# Patient Record
Sex: MALE | Race: WHITE | HISPANIC OR LATINO | Employment: OTHER | URBAN - METROPOLITAN AREA
[De-identification: names, ages, dates, MRNs, and addresses within clinical notes are randomized per-mention and may not be internally consistent; named-entity substitution may affect disease eponyms.]

---

## 2017-01-03 ENCOUNTER — ALLSCRIPTS OFFICE VISIT (OUTPATIENT)
Dept: OTHER | Facility: OTHER | Age: 67
End: 2017-01-03

## 2017-03-01 DIAGNOSIS — N28.9 DISORDER OF KIDNEY AND URETER: ICD-10-CM

## 2017-04-17 ENCOUNTER — HOSPITAL ENCOUNTER (EMERGENCY)
Facility: HOSPITAL | Age: 67
Discharge: HOME | End: 2017-04-17
Attending: EMERGENCY MEDICINE | Admitting: EMERGENCY MEDICINE
Payer: MEDICARE

## 2017-04-17 ENCOUNTER — APPOINTMENT (EMERGENCY)
Dept: RADIOLOGY | Facility: HOSPITAL | Age: 67
End: 2017-04-17
Payer: MEDICARE

## 2017-04-17 VITALS
WEIGHT: 130 LBS | TEMPERATURE: 98.8 F | OXYGEN SATURATION: 97 % | DIASTOLIC BLOOD PRESSURE: 82 MMHG | RESPIRATION RATE: 18 BRPM | HEART RATE: 90 BPM | SYSTOLIC BLOOD PRESSURE: 118 MMHG

## 2017-04-17 DIAGNOSIS — G44.209 TENSION HEADACHE: Primary | ICD-10-CM

## 2017-04-17 LAB
ANION GAP SERPL CALCULATED.3IONS-SCNC: 6 MMOL/L (ref 4–13)
APTT PPP: 28 SECONDS (ref 24–36)
BASOPHILS # BLD AUTO: 0.03 THOUSANDS/ΜL (ref 0–0.1)
BASOPHILS NFR BLD AUTO: 0 % (ref 0–1)
BUN SERPL-MCNC: 21 MG/DL (ref 5–25)
CALCIUM SERPL-MCNC: 8.1 MG/DL (ref 8.3–10.1)
CHLORIDE SERPL-SCNC: 105 MMOL/L (ref 100–108)
CO2 SERPL-SCNC: 25 MMOL/L (ref 21–32)
CREAT SERPL-MCNC: 1.9 MG/DL (ref 0.6–1.3)
EOSINOPHIL # BLD AUTO: 0.48 THOUSAND/ΜL (ref 0–0.61)
EOSINOPHIL NFR BLD AUTO: 6 % (ref 0–6)
ERYTHROCYTE [DISTWIDTH] IN BLOOD BY AUTOMATED COUNT: 14.2 % (ref 11.6–15.1)
GFR SERPL CREATININE-BSD FRML MDRD: 35.3 ML/MIN/1.73SQ M
GLUCOSE SERPL-MCNC: 236 MG/DL (ref 65–140)
HCT VFR BLD AUTO: 36.5 % (ref 36.5–49.3)
HGB BLD-MCNC: 11.7 G/DL (ref 12–17)
INR PPP: 1.1 (ref 0.86–1.16)
LYMPHOCYTES # BLD AUTO: 1.72 THOUSANDS/ΜL (ref 0.6–4.47)
LYMPHOCYTES NFR BLD AUTO: 23 % (ref 14–44)
MCH RBC QN AUTO: 24.7 PG (ref 26.8–34.3)
MCHC RBC AUTO-ENTMCNC: 32.1 G/DL (ref 31.4–37.4)
MCV RBC AUTO: 77 FL (ref 82–98)
MONOCYTES # BLD AUTO: 0.51 THOUSAND/ΜL (ref 0.17–1.22)
MONOCYTES NFR BLD AUTO: 7 % (ref 4–12)
NEUTROPHILS # BLD AUTO: 4.85 THOUSANDS/ΜL (ref 1.85–7.62)
NEUTS SEG NFR BLD AUTO: 64 % (ref 43–75)
NRBC BLD AUTO-RTO: 0 /100 WBCS
PLATELET # BLD AUTO: 114 THOUSANDS/UL (ref 149–390)
PMV BLD AUTO: 12.2 FL (ref 8.9–12.7)
POTASSIUM SERPL-SCNC: 4.4 MMOL/L (ref 3.5–5.3)
PROTHROMBIN TIME: 14.3 SECONDS (ref 12–14.3)
RBC # BLD AUTO: 4.74 MILLION/UL (ref 3.88–5.62)
SODIUM SERPL-SCNC: 136 MMOL/L (ref 136–145)
WBC # BLD AUTO: 7.62 THOUSAND/UL (ref 4.31–10.16)

## 2017-04-17 PROCEDURE — 99284 EMERGENCY DEPT VISIT MOD MDM: CPT

## 2017-04-17 PROCEDURE — 85025 COMPLETE CBC W/AUTO DIFF WBC: CPT | Performed by: EMERGENCY MEDICINE

## 2017-04-17 PROCEDURE — 80048 BASIC METABOLIC PNL TOTAL CA: CPT | Performed by: EMERGENCY MEDICINE

## 2017-04-17 PROCEDURE — 96372 THER/PROPH/DIAG INJ SC/IM: CPT

## 2017-04-17 PROCEDURE — 85610 PROTHROMBIN TIME: CPT | Performed by: EMERGENCY MEDICINE

## 2017-04-17 PROCEDURE — 85730 THROMBOPLASTIN TIME PARTIAL: CPT | Performed by: EMERGENCY MEDICINE

## 2017-04-17 PROCEDURE — 70450 CT HEAD/BRAIN W/O DYE: CPT

## 2017-04-17 PROCEDURE — 36415 COLL VENOUS BLD VENIPUNCTURE: CPT | Performed by: EMERGENCY MEDICINE

## 2017-04-17 RX ORDER — TRAMADOL HYDROCHLORIDE 50 MG/1
50 TABLET ORAL
COMMUNITY
End: 2018-01-29 | Stop reason: SDUPTHER

## 2017-04-17 RX ORDER — DIAZEPAM 5 MG/1
5 TABLET ORAL 2 TIMES DAILY
Qty: 20 TABLET | Refills: 0 | Status: ON HOLD | OUTPATIENT
Start: 2017-04-17 | End: 2017-09-18

## 2017-04-17 RX ORDER — DIPHENHYDRAMINE HYDROCHLORIDE 50 MG/ML
50 INJECTION INTRAMUSCULAR; INTRAVENOUS ONCE
Status: DISCONTINUED | OUTPATIENT
Start: 2017-04-17 | End: 2017-04-17

## 2017-04-17 RX ORDER — METOCLOPRAMIDE HYDROCHLORIDE 5 MG/ML
10 INJECTION INTRAMUSCULAR; INTRAVENOUS ONCE
Status: DISCONTINUED | OUTPATIENT
Start: 2017-04-17 | End: 2017-04-17

## 2017-04-17 RX ORDER — METOCLOPRAMIDE 10 MG/1
10 TABLET ORAL ONCE
Status: COMPLETED | OUTPATIENT
Start: 2017-04-17 | End: 2017-04-17

## 2017-04-17 RX ORDER — ACETAMINOPHEN 325 MG/1
975 TABLET ORAL ONCE
Status: COMPLETED | OUTPATIENT
Start: 2017-04-17 | End: 2017-04-17

## 2017-04-17 RX ORDER — DIAZEPAM 5 MG/1
5 TABLET ORAL ONCE
Status: COMPLETED | OUTPATIENT
Start: 2017-04-17 | End: 2017-04-17

## 2017-04-17 RX ORDER — DIPHENHYDRAMINE HYDROCHLORIDE 50 MG/ML
25 INJECTION INTRAMUSCULAR; INTRAVENOUS ONCE
Status: DISCONTINUED | OUTPATIENT
Start: 2017-04-17 | End: 2017-04-17

## 2017-04-17 RX ORDER — OXYCODONE HYDROCHLORIDE AND ACETAMINOPHEN 5; 325 MG/1; MG/1
1 TABLET ORAL EVERY 4 HOURS PRN
Qty: 8 TABLET | Refills: 0 | Status: SHIPPED | OUTPATIENT
Start: 2017-04-17 | End: 2017-04-27

## 2017-04-17 RX ORDER — DIAZEPAM 5 MG/ML
5 INJECTION, SOLUTION INTRAMUSCULAR; INTRAVENOUS ONCE
Status: DISCONTINUED | OUTPATIENT
Start: 2017-04-17 | End: 2017-04-17

## 2017-04-17 RX ORDER — OXYCODONE HYDROCHLORIDE AND ACETAMINOPHEN 5; 325 MG/1; MG/1
1 TABLET ORAL ONCE
Status: DISCONTINUED | OUTPATIENT
Start: 2017-04-17 | End: 2017-04-17

## 2017-04-17 RX ADMIN — METOCLOPRAMIDE HYDROCHLORIDE 10 MG: 10 TABLET ORAL at 12:11

## 2017-04-17 RX ADMIN — DIAZEPAM 5 MG: 5 TABLET ORAL at 11:47

## 2017-04-17 RX ADMIN — ACETAMINOPHEN 975 MG: 325 TABLET, FILM COATED ORAL at 12:12

## 2017-04-17 RX ADMIN — HYDROMORPHONE HYDROCHLORIDE 1 MG: 1 INJECTION, SOLUTION INTRAMUSCULAR; INTRAVENOUS; SUBCUTANEOUS at 13:22

## 2017-04-21 ENCOUNTER — HOSPITAL ENCOUNTER (OUTPATIENT)
Dept: RADIOLOGY | Facility: HOSPITAL | Age: 67
Discharge: HOME | End: 2017-04-21
Payer: MEDICARE

## 2017-04-21 ENCOUNTER — ALLSCRIPTS OFFICE VISIT (OUTPATIENT)
Dept: OTHER | Facility: OTHER | Age: 67
End: 2017-04-21

## 2017-04-21 ENCOUNTER — TRANSCRIBE ORDERS (OUTPATIENT)
Dept: RADIOLOGY | Facility: HOSPITAL | Age: 67
End: 2017-04-21

## 2017-04-21 DIAGNOSIS — R07.89 OTHER CHEST PAIN: ICD-10-CM

## 2017-04-21 DIAGNOSIS — R05.9 COUGH: ICD-10-CM

## 2017-04-21 PROCEDURE — 71101 X-RAY EXAM UNILAT RIBS/CHEST: CPT

## 2017-04-24 ENCOUNTER — GENERIC CONVERSION - ENCOUNTER (OUTPATIENT)
Dept: OTHER | Facility: OTHER | Age: 67
End: 2017-04-24

## 2017-05-01 DIAGNOSIS — E55.9 VITAMIN D DEFICIENCY: ICD-10-CM

## 2017-05-01 DIAGNOSIS — N28.9 DISORDER OF KIDNEY AND URETER: ICD-10-CM

## 2017-05-01 DIAGNOSIS — D69.6 THROMBOCYTOPENIA (HCC): ICD-10-CM

## 2017-05-01 DIAGNOSIS — E11.40 TYPE 2 DIABETES MELLITUS WITH DIABETIC NEUROPATHY (HCC): ICD-10-CM

## 2017-05-09 ENCOUNTER — ALLSCRIPTS OFFICE VISIT (OUTPATIENT)
Dept: OTHER | Facility: OTHER | Age: 67
End: 2017-05-09

## 2017-05-23 ENCOUNTER — TRANSCRIBE ORDERS (OUTPATIENT)
Dept: LAB | Facility: HOSPITAL | Age: 67
End: 2017-05-23

## 2017-05-23 ENCOUNTER — APPOINTMENT (OUTPATIENT)
Dept: LAB | Facility: HOSPITAL | Age: 67
End: 2017-05-23
Payer: MEDICARE

## 2017-05-23 DIAGNOSIS — C22.1 MALIGNANT NEOPLASM OF INTRAHEPATIC BILE DUCTS (HCC): ICD-10-CM

## 2017-05-23 DIAGNOSIS — D68.9 BLOOD CLOTTING DISORDER (HCC): ICD-10-CM

## 2017-05-23 DIAGNOSIS — Z94.4 LIVER REPLACED BY TRANSPLANT (HCC): ICD-10-CM

## 2017-05-23 DIAGNOSIS — R79.1 ABNORMAL COAGULATION PROFILE: ICD-10-CM

## 2017-05-23 DIAGNOSIS — Z94.4 LIVER REPLACED BY TRANSPLANT (HCC): Primary | ICD-10-CM

## 2017-05-23 LAB
ALBUMIN SERPL BCP-MCNC: 3.6 G/DL (ref 3.5–5)
ALP SERPL-CCNC: 89 U/L (ref 46–116)
ALT SERPL W P-5'-P-CCNC: 15 U/L (ref 12–78)
ANION GAP SERPL CALCULATED.3IONS-SCNC: 6 MMOL/L (ref 4–13)
AST SERPL W P-5'-P-CCNC: 17 U/L (ref 5–45)
BASOPHILS # BLD AUTO: 0.03 THOUSANDS/ΜL (ref 0–0.1)
BASOPHILS NFR BLD AUTO: 1 % (ref 0–1)
BILIRUB SERPL-MCNC: 0.28 MG/DL (ref 0.2–1)
BUN SERPL-MCNC: 26 MG/DL (ref 5–25)
CALCIUM SERPL-MCNC: 9.5 MG/DL (ref 8.3–10.1)
CHLORIDE SERPL-SCNC: 106 MMOL/L (ref 100–108)
CO2 SERPL-SCNC: 29 MMOL/L (ref 21–32)
CREAT SERPL-MCNC: 1.72 MG/DL (ref 0.6–1.3)
EOSINOPHIL # BLD AUTO: 0.51 THOUSAND/ΜL (ref 0–0.61)
EOSINOPHIL NFR BLD AUTO: 8 % (ref 0–6)
ERYTHROCYTE [DISTWIDTH] IN BLOOD BY AUTOMATED COUNT: 14.7 % (ref 11.6–15.1)
GFR SERPL CREATININE-BSD FRML MDRD: 39.6 ML/MIN/1.73SQ M
GGT SERPL-CCNC: 29 U/L (ref 5–85)
GLUCOSE P FAST SERPL-MCNC: 112 MG/DL (ref 65–99)
HCT VFR BLD AUTO: 40.9 % (ref 36.5–49.3)
HGB BLD-MCNC: 13 G/DL (ref 12–17)
INR PPP: 1.05 (ref 0.86–1.16)
LYMPHOCYTES # BLD AUTO: 2.33 THOUSANDS/ΜL (ref 0.6–4.47)
LYMPHOCYTES NFR BLD AUTO: 35 % (ref 14–44)
MAGNESIUM SERPL-MCNC: 1.9 MG/DL (ref 1.6–2.6)
MCH RBC QN AUTO: 24.7 PG (ref 26.8–34.3)
MCHC RBC AUTO-ENTMCNC: 31.8 G/DL (ref 31.4–37.4)
MCV RBC AUTO: 78 FL (ref 82–98)
MONOCYTES # BLD AUTO: 0.42 THOUSAND/ΜL (ref 0.17–1.22)
MONOCYTES NFR BLD AUTO: 6 % (ref 4–12)
NEUTROPHILS # BLD AUTO: 3.32 THOUSANDS/ΜL (ref 1.85–7.62)
NEUTS SEG NFR BLD AUTO: 50 % (ref 43–75)
NRBC BLD AUTO-RTO: 0 /100 WBCS
PLATELET # BLD AUTO: 133 THOUSANDS/UL (ref 149–390)
PMV BLD AUTO: 12.4 FL (ref 8.9–12.7)
POTASSIUM SERPL-SCNC: 4.8 MMOL/L (ref 3.5–5.3)
PROT SERPL-MCNC: 8.5 G/DL (ref 6.4–8.2)
PROTHROMBIN TIME: 13.7 SECONDS (ref 12.1–14.4)
RBC # BLD AUTO: 5.26 MILLION/UL (ref 3.88–5.62)
SODIUM SERPL-SCNC: 141 MMOL/L (ref 136–145)
WBC # BLD AUTO: 6.62 THOUSAND/UL (ref 4.31–10.16)

## 2017-05-23 PROCEDURE — 80053 COMPREHEN METABOLIC PANEL: CPT

## 2017-05-23 PROCEDURE — 80197 ASSAY OF TACROLIMUS: CPT

## 2017-05-23 PROCEDURE — 85025 COMPLETE CBC W/AUTO DIFF WBC: CPT

## 2017-05-23 PROCEDURE — 85610 PROTHROMBIN TIME: CPT

## 2017-05-23 PROCEDURE — 83735 ASSAY OF MAGNESIUM: CPT

## 2017-05-23 PROCEDURE — 36415 COLL VENOUS BLD VENIPUNCTURE: CPT

## 2017-05-23 PROCEDURE — 82977 ASSAY OF GGT: CPT

## 2017-05-30 ENCOUNTER — ALLSCRIPTS OFFICE VISIT (OUTPATIENT)
Dept: OTHER | Facility: OTHER | Age: 67
End: 2017-05-30

## 2017-06-01 LAB — TACROLIMUS BLD LC/MS/MS-MCNC: 6.7 NG/ML (ref 2–20)

## 2017-08-29 ENCOUNTER — APPOINTMENT (OUTPATIENT)
Dept: LAB | Facility: HOSPITAL | Age: 67
End: 2017-08-29
Payer: MEDICARE

## 2017-08-29 ENCOUNTER — TRANSCRIBE ORDERS (OUTPATIENT)
Dept: LAB | Facility: HOSPITAL | Age: 67
End: 2017-08-29

## 2017-08-29 DIAGNOSIS — C22.1 MALIGNANT NEOPLASM OF INTRAHEPATIC BILE DUCTS (HCC): ICD-10-CM

## 2017-08-29 DIAGNOSIS — Z94.4 LIVER REPLACED BY TRANSPLANT (HCC): Primary | ICD-10-CM

## 2017-08-29 DIAGNOSIS — R79.1 ABNORMAL COAGULATION PROFILE: ICD-10-CM

## 2017-08-29 DIAGNOSIS — Z94.4 LIVER REPLACED BY TRANSPLANT (HCC): ICD-10-CM

## 2017-08-29 DIAGNOSIS — D68.9 BLOOD CLOTTING DISORDER (HCC): ICD-10-CM

## 2017-08-29 LAB
ALBUMIN SERPL BCP-MCNC: 3.6 G/DL (ref 3.5–5)
ALP SERPL-CCNC: 99 U/L (ref 46–116)
ALT SERPL W P-5'-P-CCNC: 12 U/L (ref 12–78)
ANION GAP SERPL CALCULATED.3IONS-SCNC: 5 MMOL/L (ref 4–13)
AST SERPL W P-5'-P-CCNC: 16 U/L (ref 5–45)
BASOPHILS # BLD AUTO: 0.05 THOUSANDS/ΜL (ref 0–0.1)
BASOPHILS NFR BLD AUTO: 1 % (ref 0–1)
BILIRUB SERPL-MCNC: 0.3 MG/DL (ref 0.2–1)
BUN SERPL-MCNC: 20 MG/DL (ref 5–25)
CALCIUM SERPL-MCNC: 9.2 MG/DL (ref 8.3–10.1)
CHLORIDE SERPL-SCNC: 103 MMOL/L (ref 100–108)
CO2 SERPL-SCNC: 29 MMOL/L (ref 21–32)
CREAT SERPL-MCNC: 1.73 MG/DL (ref 0.6–1.3)
EOSINOPHIL # BLD AUTO: 0.67 THOUSAND/ΜL (ref 0–0.61)
EOSINOPHIL NFR BLD AUTO: 10 % (ref 0–6)
ERYTHROCYTE [DISTWIDTH] IN BLOOD BY AUTOMATED COUNT: 14.4 % (ref 11.6–15.1)
GFR SERPL CREATININE-BSD FRML MDRD: 39 ML/MIN/1.73SQ M
GGT SERPL-CCNC: 23 U/L (ref 5–85)
GLUCOSE P FAST SERPL-MCNC: 107 MG/DL (ref 65–99)
HCT VFR BLD AUTO: 40 % (ref 36.5–49.3)
HGB BLD-MCNC: 12.6 G/DL (ref 12–17)
INR PPP: 1.06 (ref 0.86–1.16)
LYMPHOCYTES # BLD AUTO: 2.69 THOUSANDS/ΜL (ref 0.6–4.47)
LYMPHOCYTES NFR BLD AUTO: 40 % (ref 14–44)
MAGNESIUM SERPL-MCNC: 1.8 MG/DL (ref 1.6–2.6)
MCH RBC QN AUTO: 24.4 PG (ref 26.8–34.3)
MCHC RBC AUTO-ENTMCNC: 31.5 G/DL (ref 31.4–37.4)
MCV RBC AUTO: 78 FL (ref 82–98)
MONOCYTES # BLD AUTO: 0.53 THOUSAND/ΜL (ref 0.17–1.22)
MONOCYTES NFR BLD AUTO: 8 % (ref 4–12)
NEUTROPHILS # BLD AUTO: 2.79 THOUSANDS/ΜL (ref 1.85–7.62)
NEUTS SEG NFR BLD AUTO: 41 % (ref 43–75)
NRBC BLD AUTO-RTO: 0 /100 WBCS
PLATELET # BLD AUTO: 119 THOUSANDS/UL (ref 149–390)
PMV BLD AUTO: 12.1 FL (ref 8.9–12.7)
POTASSIUM SERPL-SCNC: 4.4 MMOL/L (ref 3.5–5.3)
PROT SERPL-MCNC: 8.3 G/DL (ref 6.4–8.2)
PROTHROMBIN TIME: 13.8 SECONDS (ref 12.1–14.4)
RBC # BLD AUTO: 5.16 MILLION/UL (ref 3.88–5.62)
SODIUM SERPL-SCNC: 137 MMOL/L (ref 136–145)
WBC # BLD AUTO: 6.75 THOUSAND/UL (ref 4.31–10.16)

## 2017-08-29 PROCEDURE — 80197 ASSAY OF TACROLIMUS: CPT

## 2017-08-29 PROCEDURE — 85610 PROTHROMBIN TIME: CPT

## 2017-08-29 PROCEDURE — 83735 ASSAY OF MAGNESIUM: CPT

## 2017-08-29 PROCEDURE — 82977 ASSAY OF GGT: CPT

## 2017-08-29 PROCEDURE — 36415 COLL VENOUS BLD VENIPUNCTURE: CPT

## 2017-08-29 PROCEDURE — 80053 COMPREHEN METABOLIC PANEL: CPT

## 2017-08-29 PROCEDURE — 85025 COMPLETE CBC W/AUTO DIFF WBC: CPT

## 2017-08-31 LAB — TACROLIMUS BLD LC/MS/MS-MCNC: 3.2 NG/ML (ref 2–20)

## 2017-09-18 ENCOUNTER — APPOINTMENT (EMERGENCY)
Dept: RADIOLOGY | Facility: HOSPITAL | Age: 67
DRG: 062 | End: 2017-09-18
Payer: MEDICARE

## 2017-09-18 ENCOUNTER — HOSPITAL ENCOUNTER (INPATIENT)
Facility: HOSPITAL | Age: 67
LOS: 2 days | Discharge: HOME | DRG: 062 | End: 2017-09-20
Attending: EMERGENCY MEDICINE | Admitting: EMERGENCY MEDICINE
Payer: MEDICARE

## 2017-09-18 ENCOUNTER — APPOINTMENT (INPATIENT)
Dept: RADIOLOGY | Facility: HOSPITAL | Age: 67
DRG: 062 | End: 2017-09-18
Payer: MEDICARE

## 2017-09-18 ENCOUNTER — APPOINTMENT (INPATIENT)
Dept: NON INVASIVE DIAGNOSTICS | Facility: HOSPITAL | Age: 67
DRG: 062 | End: 2017-09-18
Payer: MEDICARE

## 2017-09-18 DIAGNOSIS — I63.9 ACUTE ISCHEMIC STROKE (HCC): Primary | ICD-10-CM

## 2017-09-18 PROBLEM — Z94.4 LIVER TRANSPLANTED (HCC): Status: ACTIVE | Noted: 2017-09-18

## 2017-09-18 PROBLEM — Z86.19 HISTORY OF HEPATITIS C: Status: ACTIVE | Noted: 2017-09-18

## 2017-09-18 PROBLEM — E11.9 DIABETES MELLITUS (HCC): Status: ACTIVE | Noted: 2017-09-18

## 2017-09-18 PROBLEM — Z92.25 HISTORY OF IMMUNOSUPPRESSION THERAPY: Status: ACTIVE | Noted: 2017-09-18

## 2017-09-18 LAB
ANION GAP SERPL CALCULATED.3IONS-SCNC: 7 MMOL/L (ref 4–13)
APTT PPP: 38 SECONDS (ref 23–35)
ATRIAL RATE: 68 BPM
BUN SERPL-MCNC: 25 MG/DL (ref 5–25)
CALCIUM SERPL-MCNC: 9 MG/DL (ref 8.3–10.1)
CHLORIDE SERPL-SCNC: 103 MMOL/L (ref 100–108)
CO2 SERPL-SCNC: 25 MMOL/L (ref 21–32)
CREAT SERPL-MCNC: 1.66 MG/DL (ref 0.6–1.3)
ERYTHROCYTE [DISTWIDTH] IN BLOOD BY AUTOMATED COUNT: 14.4 % (ref 11.6–15.1)
GFR SERPL CREATININE-BSD FRML MDRD: 41 ML/MIN/1.73SQ M
GLUCOSE SERPL-MCNC: 136 MG/DL (ref 65–140)
GLUCOSE SERPL-MCNC: 154 MG/DL (ref 65–140)
GLUCOSE SERPL-MCNC: 186 MG/DL (ref 65–140)
HCT VFR BLD AUTO: 34.7 % (ref 36.5–49.3)
HGB BLD-MCNC: 11.3 G/DL (ref 12–17)
INR PPP: 1.03 (ref 0.86–1.16)
MCH RBC QN AUTO: 24.7 PG (ref 26.8–34.3)
MCHC RBC AUTO-ENTMCNC: 32.6 G/DL (ref 31.4–37.4)
MCV RBC AUTO: 76 FL (ref 82–98)
P AXIS: 0 DEGREES
PLATELET # BLD AUTO: 121 THOUSANDS/UL (ref 149–390)
PMV BLD AUTO: 12 FL (ref 8.9–12.7)
POTASSIUM SERPL-SCNC: 5.2 MMOL/L (ref 3.5–5.3)
PR INTERVAL: 164 MS
PROTHROMBIN TIME: 13.5 SECONDS (ref 12.1–14.4)
QRS AXIS: -12 DEGREES
QRSD INTERVAL: 82 MS
QT INTERVAL: 376 MS
QTC INTERVAL: 399 MS
RBC # BLD AUTO: 4.57 MILLION/UL (ref 3.88–5.62)
SODIUM SERPL-SCNC: 135 MMOL/L (ref 136–145)
SPECIMEN SOURCE: NORMAL
T WAVE AXIS: 26 DEGREES
TROPONIN I BLD-MCNC: 0.02 NG/ML (ref 0–0.08)
VENTRICULAR RATE: 68 BPM
WBC # BLD AUTO: 6.19 THOUSAND/UL (ref 4.31–10.16)

## 2017-09-18 PROCEDURE — 85610 PROTHROMBIN TIME: CPT | Performed by: EMERGENCY MEDICINE

## 2017-09-18 PROCEDURE — 70498 CT ANGIOGRAPHY NECK: CPT

## 2017-09-18 PROCEDURE — 70551 MRI BRAIN STEM W/O DYE: CPT

## 2017-09-18 PROCEDURE — 96374 THER/PROPH/DIAG INJ IV PUSH: CPT

## 2017-09-18 PROCEDURE — 3E03317 INTRODUCTION OF OTHER THROMBOLYTIC INTO PERIPHERAL VEIN, PERCUTANEOUS APPROACH: ICD-10-PCS | Performed by: INTERNAL MEDICINE

## 2017-09-18 PROCEDURE — 70496 CT ANGIOGRAPHY HEAD: CPT

## 2017-09-18 PROCEDURE — 99285 EMERGENCY DEPT VISIT HI MDM: CPT

## 2017-09-18 PROCEDURE — 70450 CT HEAD/BRAIN W/O DYE: CPT

## 2017-09-18 PROCEDURE — 85730 THROMBOPLASTIN TIME PARTIAL: CPT | Performed by: EMERGENCY MEDICINE

## 2017-09-18 PROCEDURE — 85027 COMPLETE CBC AUTOMATED: CPT | Performed by: EMERGENCY MEDICINE

## 2017-09-18 PROCEDURE — 93306 TTE W/DOPPLER COMPLETE: CPT

## 2017-09-18 PROCEDURE — 82948 REAGENT STRIP/BLOOD GLUCOSE: CPT

## 2017-09-18 PROCEDURE — 71020 HB CHEST X-RAY 2VW FRONTAL&LATL: CPT

## 2017-09-18 PROCEDURE — 36415 COLL VENOUS BLD VENIPUNCTURE: CPT | Performed by: EMERGENCY MEDICINE

## 2017-09-18 PROCEDURE — 80048 BASIC METABOLIC PNL TOTAL CA: CPT | Performed by: EMERGENCY MEDICINE

## 2017-09-18 PROCEDURE — 93005 ELECTROCARDIOGRAM TRACING: CPT | Performed by: EMERGENCY MEDICINE

## 2017-09-18 PROCEDURE — 84484 ASSAY OF TROPONIN QUANT: CPT

## 2017-09-18 RX ORDER — TACROLIMUS 1 MG/1
1 CAPSULE ORAL DAILY
Status: DISCONTINUED | OUTPATIENT
Start: 2017-09-18 | End: 2017-09-20 | Stop reason: HOSPADM

## 2017-09-18 RX ORDER — LORAZEPAM 2 MG/ML
INJECTION INTRAMUSCULAR
Status: DISCONTINUED
Start: 2017-09-18 | End: 2017-09-18 | Stop reason: WASHOUT

## 2017-09-18 RX ORDER — ZOLPIDEM TARTRATE 5 MG/1
10 TABLET ORAL ONCE
Status: DISCONTINUED | OUTPATIENT
Start: 2017-09-18 | End: 2017-09-18

## 2017-09-18 RX ORDER — ASPIRIN 81 MG/1
324 TABLET, CHEWABLE ORAL ONCE
Status: COMPLETED | OUTPATIENT
Start: 2017-09-18 | End: 2017-09-18

## 2017-09-18 RX ORDER — ACETAMINOPHEN 325 MG/1
650 TABLET ORAL EVERY 6 HOURS PRN
Status: DISCONTINUED | OUTPATIENT
Start: 2017-09-18 | End: 2017-09-20 | Stop reason: HOSPADM

## 2017-09-18 RX ORDER — SODIUM CHLORIDE 9 MG/ML
100 INJECTION, SOLUTION INTRAVENOUS ONCE
Status: DISCONTINUED | OUTPATIENT
Start: 2017-09-18 | End: 2017-09-18

## 2017-09-18 RX ORDER — PREGABALIN 25 MG/1
25 CAPSULE ORAL 2 TIMES DAILY
COMMUNITY
End: 2018-12-18 | Stop reason: HOSPADM

## 2017-09-18 RX ORDER — ZOLPIDEM TARTRATE 5 MG/1
5 TABLET ORAL
Status: DISCONTINUED | OUTPATIENT
Start: 2017-09-18 | End: 2017-09-19

## 2017-09-18 RX ORDER — HYDRALAZINE HYDROCHLORIDE 20 MG/ML
5 INJECTION INTRAMUSCULAR; INTRAVENOUS ONCE
Status: DISCONTINUED | OUTPATIENT
Start: 2017-09-18 | End: 2017-09-18

## 2017-09-18 RX ORDER — PREGABALIN 25 MG/1
25 CAPSULE ORAL 2 TIMES DAILY
Status: DISCONTINUED | OUTPATIENT
Start: 2017-09-18 | End: 2017-09-20 | Stop reason: HOSPADM

## 2017-09-18 RX ORDER — LORAZEPAM 2 MG/ML
0.5 INJECTION INTRAMUSCULAR ONCE
Status: COMPLETED | OUTPATIENT
Start: 2017-09-18 | End: 2017-09-18

## 2017-09-18 RX ORDER — CLOPIDOGREL BISULFATE 75 MG/1
300 TABLET ORAL ONCE
Status: COMPLETED | OUTPATIENT
Start: 2017-09-18 | End: 2017-09-18

## 2017-09-18 RX ORDER — ATORVASTATIN CALCIUM 40 MG/1
40 TABLET, FILM COATED ORAL EVERY EVENING
Status: DISCONTINUED | OUTPATIENT
Start: 2017-09-18 | End: 2017-09-20 | Stop reason: HOSPADM

## 2017-09-18 RX ORDER — INSULIN GLARGINE 100 [IU]/ML
30 INJECTION, SOLUTION SUBCUTANEOUS
COMMUNITY
End: 2018-08-29 | Stop reason: SDUPTHER

## 2017-09-18 RX ORDER — SODIUM CHLORIDE 9 MG/ML
100 INJECTION, SOLUTION INTRAVENOUS ONCE
Status: COMPLETED | OUTPATIENT
Start: 2017-09-18 | End: 2017-09-20

## 2017-09-18 RX ORDER — DIPHENHYDRAMINE HYDROCHLORIDE 50 MG/ML
50 INJECTION INTRAMUSCULAR; INTRAVENOUS ONCE
Status: COMPLETED | OUTPATIENT
Start: 2017-09-18 | End: 2017-09-18

## 2017-09-18 RX ADMIN — CLOPIDOGREL BISULFATE 300 MG: 75 TABLET ORAL at 10:06

## 2017-09-18 RX ADMIN — IODIXANOL 75 ML: 320 INJECTION, SOLUTION INTRAVASCULAR at 09:41

## 2017-09-18 RX ADMIN — ATORVASTATIN CALCIUM 40 MG: 40 TABLET, FILM COATED ORAL at 18:02

## 2017-09-18 RX ADMIN — ALTEPLASE 50.2 MG: KIT at 09:44

## 2017-09-18 RX ADMIN — DIPHENHYDRAMINE HYDROCHLORIDE 50 MG: 50 INJECTION, SOLUTION INTRAMUSCULAR; INTRAVENOUS at 09:22

## 2017-09-18 RX ADMIN — PREGABALIN 25 MG: 25 CAPSULE ORAL at 18:49

## 2017-09-18 RX ADMIN — ASPIRIN 81 MG 324 MG: 81 TABLET ORAL at 10:06

## 2017-09-18 RX ADMIN — LORAZEPAM 0.5 MG: 2 INJECTION INTRAMUSCULAR; INTRAVENOUS at 11:52

## 2017-09-18 RX ADMIN — TACROLIMUS 1 MG: 1 CAPSULE ORAL at 14:11

## 2017-09-18 RX ADMIN — SODIUM CHLORIDE 2.5 MG/HR: 0.9 INJECTION, SOLUTION INTRAVENOUS at 11:22

## 2017-09-18 RX ADMIN — SODIUM CHLORIDE 100 ML/HR: 0.9 INJECTION, SOLUTION INTRAVENOUS at 10:45

## 2017-09-18 RX ADMIN — ZOLPIDEM TARTRATE 5 MG: 5 TABLET, FILM COATED ORAL at 23:17

## 2017-09-18 RX ADMIN — ACETAMINOPHEN 650 MG: 325 TABLET ORAL at 22:23

## 2017-09-19 ENCOUNTER — APPOINTMENT (INPATIENT)
Dept: RADIOLOGY | Facility: HOSPITAL | Age: 67
DRG: 062 | End: 2017-09-19
Payer: MEDICARE

## 2017-09-19 LAB
ANION GAP SERPL CALCULATED.3IONS-SCNC: 7 MMOL/L (ref 4–13)
BUN SERPL-MCNC: 23 MG/DL (ref 5–25)
CALCIUM SERPL-MCNC: 9.2 MG/DL (ref 8.3–10.1)
CHLORIDE SERPL-SCNC: 101 MMOL/L (ref 100–108)
CHOLEST SERPL-MCNC: 205 MG/DL (ref 50–200)
CO2 SERPL-SCNC: 25 MMOL/L (ref 21–32)
CREAT SERPL-MCNC: 1.76 MG/DL (ref 0.6–1.3)
ERYTHROCYTE [DISTWIDTH] IN BLOOD BY AUTOMATED COUNT: 14.4 % (ref 11.6–15.1)
EST. AVERAGE GLUCOSE BLD GHB EST-MCNC: 189 MG/DL
GFR SERPL CREATININE-BSD FRML MDRD: 39 ML/MIN/1.73SQ M
GLUCOSE SERPL-MCNC: 126 MG/DL (ref 65–140)
GLUCOSE SERPL-MCNC: 159 MG/DL (ref 65–140)
GLUCOSE SERPL-MCNC: 161 MG/DL (ref 65–140)
GLUCOSE SERPL-MCNC: 183 MG/DL (ref 65–140)
GLUCOSE SERPL-MCNC: 184 MG/DL (ref 65–140)
GLUCOSE SERPL-MCNC: 190 MG/DL (ref 65–140)
HBA1C MFR BLD: 8.2 % (ref 4.2–6.3)
HCT VFR BLD AUTO: 44.1 % (ref 36.5–49.3)
HDLC SERPL-MCNC: 47 MG/DL (ref 40–60)
HGB BLD-MCNC: 14.6 G/DL (ref 12–17)
INR PPP: 1.04 (ref 0.86–1.16)
LDLC SERPL CALC-MCNC: 133 MG/DL (ref 0–100)
MCH RBC QN AUTO: 25.1 PG (ref 26.8–34.3)
MCHC RBC AUTO-ENTMCNC: 33.1 G/DL (ref 31.4–37.4)
MCV RBC AUTO: 76 FL (ref 82–98)
PLATELET # BLD AUTO: 171 THOUSANDS/UL (ref 149–390)
POTASSIUM SERPL-SCNC: 5.8 MMOL/L (ref 3.5–5.3)
PROTHROMBIN TIME: 13.6 SECONDS (ref 12.1–14.4)
RBC # BLD AUTO: 5.82 MILLION/UL (ref 3.88–5.62)
SODIUM SERPL-SCNC: 133 MMOL/L (ref 136–145)
TRIGL SERPL-MCNC: 127 MG/DL
WBC # BLD AUTO: 14.4 THOUSAND/UL (ref 4.31–10.16)

## 2017-09-19 PROCEDURE — 97162 PT EVAL MOD COMPLEX 30 MIN: CPT

## 2017-09-19 PROCEDURE — G8987 SELF CARE CURRENT STATUS: HCPCS

## 2017-09-19 PROCEDURE — G8979 MOBILITY GOAL STATUS: HCPCS

## 2017-09-19 PROCEDURE — 80048 BASIC METABOLIC PNL TOTAL CA: CPT | Performed by: EMERGENCY MEDICINE

## 2017-09-19 PROCEDURE — 80061 LIPID PANEL: CPT | Performed by: EMERGENCY MEDICINE

## 2017-09-19 PROCEDURE — G8989 SELF CARE D/C STATUS: HCPCS

## 2017-09-19 PROCEDURE — 97165 OT EVAL LOW COMPLEX 30 MIN: CPT

## 2017-09-19 PROCEDURE — 70450 CT HEAD/BRAIN W/O DYE: CPT

## 2017-09-19 PROCEDURE — 82948 REAGENT STRIP/BLOOD GLUCOSE: CPT

## 2017-09-19 PROCEDURE — G8988 SELF CARE GOAL STATUS: HCPCS

## 2017-09-19 PROCEDURE — 83036 HEMOGLOBIN GLYCOSYLATED A1C: CPT | Performed by: EMERGENCY MEDICINE

## 2017-09-19 PROCEDURE — G8980 MOBILITY D/C STATUS: HCPCS

## 2017-09-19 PROCEDURE — 85027 COMPLETE CBC AUTOMATED: CPT | Performed by: EMERGENCY MEDICINE

## 2017-09-19 PROCEDURE — 85610 PROTHROMBIN TIME: CPT | Performed by: EMERGENCY MEDICINE

## 2017-09-19 PROCEDURE — G8978 MOBILITY CURRENT STATUS: HCPCS

## 2017-09-19 RX ORDER — TEMAZEPAM 15 MG/1
15 CAPSULE ORAL
Status: DISCONTINUED | OUTPATIENT
Start: 2017-09-19 | End: 2017-09-20 | Stop reason: HOSPADM

## 2017-09-19 RX ORDER — INSULIN GLARGINE 100 [IU]/ML
30 INJECTION, SOLUTION SUBCUTANEOUS
Status: DISCONTINUED | OUTPATIENT
Start: 2017-09-19 | End: 2017-09-20 | Stop reason: HOSPADM

## 2017-09-19 RX ORDER — CLOPIDOGREL BISULFATE 75 MG/1
75 TABLET ORAL DAILY
Status: DISCONTINUED | OUTPATIENT
Start: 2017-09-19 | End: 2017-09-20 | Stop reason: HOSPADM

## 2017-09-19 RX ORDER — HEPARIN SODIUM 5000 [USP'U]/ML
5000 INJECTION, SOLUTION INTRAVENOUS; SUBCUTANEOUS EVERY 8 HOURS SCHEDULED
Status: DISCONTINUED | OUTPATIENT
Start: 2017-09-19 | End: 2017-09-20 | Stop reason: HOSPADM

## 2017-09-19 RX ORDER — ZOLPIDEM TARTRATE 5 MG/1
10 TABLET ORAL
Status: DISCONTINUED | OUTPATIENT
Start: 2017-09-19 | End: 2017-09-20 | Stop reason: HOSPADM

## 2017-09-19 RX ORDER — ASPIRIN 81 MG/1
81 TABLET, CHEWABLE ORAL DAILY
Status: DISCONTINUED | OUTPATIENT
Start: 2017-09-19 | End: 2017-09-20 | Stop reason: HOSPADM

## 2017-09-19 RX ADMIN — CLOPIDOGREL BISULFATE 75 MG: 75 TABLET ORAL at 15:57

## 2017-09-19 RX ADMIN — ASPIRIN 81 MG 81 MG: 81 TABLET ORAL at 12:38

## 2017-09-19 RX ADMIN — INSULIN LISPRO 1 UNITS: 100 INJECTION, SOLUTION INTRAVENOUS; SUBCUTANEOUS at 08:30

## 2017-09-19 RX ADMIN — ACETAMINOPHEN 650 MG: 325 TABLET ORAL at 08:28

## 2017-09-19 RX ADMIN — PREGABALIN 25 MG: 25 CAPSULE ORAL at 09:42

## 2017-09-19 RX ADMIN — INSULIN GLARGINE 30 UNITS: 100 INJECTION, SOLUTION SUBCUTANEOUS at 21:10

## 2017-09-19 RX ADMIN — ATORVASTATIN CALCIUM 40 MG: 40 TABLET, FILM COATED ORAL at 18:10

## 2017-09-19 RX ADMIN — HEPARIN SODIUM 5000 UNITS: 5000 INJECTION, SOLUTION INTRAVENOUS; SUBCUTANEOUS at 14:40

## 2017-09-19 RX ADMIN — ACETAMINOPHEN 650 MG: 325 TABLET ORAL at 19:32

## 2017-09-19 RX ADMIN — ACETAMINOPHEN 650 MG: 325 TABLET ORAL at 05:48

## 2017-09-19 RX ADMIN — ZOLPIDEM TARTRATE 10 MG: 5 TABLET ORAL at 20:57

## 2017-09-19 RX ADMIN — HEPARIN SODIUM 5000 UNITS: 5000 INJECTION, SOLUTION INTRAVENOUS; SUBCUTANEOUS at 21:10

## 2017-09-19 RX ADMIN — TACROLIMUS 1 MG: 1 CAPSULE ORAL at 09:42

## 2017-09-19 RX ADMIN — INSULIN LISPRO 2 UNITS: 100 INJECTION, SOLUTION INTRAVENOUS; SUBCUTANEOUS at 15:58

## 2017-09-19 RX ADMIN — PREGABALIN 25 MG: 25 CAPSULE ORAL at 18:10

## 2017-09-19 RX ADMIN — TEMAZEPAM 15 MG: 15 CAPSULE ORAL at 20:57

## 2017-09-19 RX ADMIN — INSULIN LISPRO 1 UNITS: 100 INJECTION, SOLUTION INTRAVENOUS; SUBCUTANEOUS at 12:34

## 2017-09-20 VITALS
HEIGHT: 64 IN | TEMPERATURE: 98.1 F | DIASTOLIC BLOOD PRESSURE: 65 MMHG | HEART RATE: 82 BPM | RESPIRATION RATE: 18 BRPM | WEIGHT: 131.39 LBS | BODY MASS INDEX: 22.43 KG/M2 | OXYGEN SATURATION: 99 % | SYSTOLIC BLOOD PRESSURE: 115 MMHG

## 2017-09-20 PROBLEM — D69.6 THROMBOCYTOPENIA (HCC): Chronic | Status: ACTIVE | Noted: 2017-09-20

## 2017-09-20 PROBLEM — N18.30 CKD (CHRONIC KIDNEY DISEASE) STAGE 3, GFR 30-59 ML/MIN (HCC): Chronic | Status: ACTIVE | Noted: 2017-09-20

## 2017-09-20 LAB
GLUCOSE SERPL-MCNC: 111 MG/DL (ref 65–140)
GLUCOSE SERPL-MCNC: 86 MG/DL (ref 65–140)

## 2017-09-20 PROCEDURE — 82948 REAGENT STRIP/BLOOD GLUCOSE: CPT

## 2017-09-20 RX ORDER — ATORVASTATIN CALCIUM 40 MG/1
40 TABLET, FILM COATED ORAL EVERY EVENING
Qty: 30 TABLET | Refills: 0 | Status: ON HOLD | OUTPATIENT
Start: 2017-09-20 | End: 2018-12-18

## 2017-09-20 RX ORDER — ASPIRIN 81 MG/1
81 TABLET, CHEWABLE ORAL DAILY
Qty: 30 TABLET | Refills: 0 | Status: SHIPPED | OUTPATIENT
Start: 2017-09-20 | End: 2018-12-18 | Stop reason: HOSPADM

## 2017-09-20 RX ORDER — CLOPIDOGREL BISULFATE 75 MG/1
75 TABLET ORAL DAILY
Qty: 30 TABLET | Refills: 0 | Status: ON HOLD | OUTPATIENT
Start: 2017-09-20 | End: 2018-12-18

## 2017-09-20 RX ADMIN — PREGABALIN 25 MG: 25 CAPSULE ORAL at 08:05

## 2017-09-20 RX ADMIN — TACROLIMUS 1 MG: 1 CAPSULE ORAL at 08:05

## 2017-09-20 RX ADMIN — CLOPIDOGREL BISULFATE 75 MG: 75 TABLET ORAL at 08:05

## 2017-09-20 RX ADMIN — HEPARIN SODIUM 5000 UNITS: 5000 INJECTION, SOLUTION INTRAVENOUS; SUBCUTANEOUS at 05:28

## 2017-09-20 RX ADMIN — ASPIRIN 81 MG 81 MG: 81 TABLET ORAL at 08:05

## 2017-09-22 ENCOUNTER — GENERIC CONVERSION - ENCOUNTER (OUTPATIENT)
Dept: OTHER | Facility: OTHER | Age: 67
End: 2017-09-22

## 2017-10-02 ENCOUNTER — ALLSCRIPTS OFFICE VISIT (OUTPATIENT)
Dept: OTHER | Facility: OTHER | Age: 67
End: 2017-10-02

## 2017-10-02 DIAGNOSIS — N28.9 DISORDER OF KIDNEY AND URETER: ICD-10-CM

## 2017-10-02 DIAGNOSIS — I63.9 CEREBRAL INFARCTION (HCC): ICD-10-CM

## 2017-10-27 ENCOUNTER — ALLSCRIPTS OFFICE VISIT (OUTPATIENT)
Dept: OTHER | Facility: OTHER | Age: 67
End: 2017-10-27

## 2017-10-28 NOTE — PROGRESS NOTES
Assessment  1  Cerebral arterial aneurysm (437 3) (I67 1)   2  CVA (cerebral vascular accident) (434 91) (I63 9)   3  Hospital discharge follow-up (V67 59) (Z09)   4  Peripheral neuropathy (356 9) (G62 9)    Plan  CVA (cerebral vascular accident)    · (1) ASPIRIN PLATELET INHIBITOR; Status:Active; Requested THJ:44ZFQ6123;    Perform:[facility]; UZR:85CIM7436; Ordered; For:CVA (cerebral vascular accident); Ordered By:Sherry Hoang;   · Follow-up visit in 6 months Evaluation and Treatment  Follow-up  Status: Complete   Done: 27Oct2017   Ordered; For: CVA (cerebral vascular accident); Ordered By: Maia Guidry Performed:  Due: 59SNE4224; Last Updated By: Ashleigh Marie; 10/27/2017 11:37:33 AM    Discussion/Summary  Discussion Summary:   Left lentiform nucleus acute ischemic stroke- 9/2017- s/p TPA with symptom resolution  Neurologic exam with mild cognitive slowing but otherwise no gross lateralizing deficits  C/w ASA and statin for secondary stroke preventionStroke risk factors: HLD and DM- not well controlled, we discussed risk factor modification at length  He no longer drinks alcohol (hx alcohol abuse), no longer smokes (hx tobacco use)Will assess responsiveness to ASA- ASA assayECHO intact with no intracardiac thrombus  I offered nutrition consult for better diet but they defer it at this time  CTA with chronic non visualization of right vertebral similar to prior, and parasellar aneurysm clip notedWill follow up in six months time     Stroke Patient Family Education St ke:   The patient and patient's family was educated regarding:   Patient/Family was also educated regarding: Stroke Diagnosis (TIA,Ischemic Stroke, ICH, SAH),-- Need For Medical Follow Up,-- Medication Adherence,-- Anticoagulation,-- Personal Stroke/Cardiovascular Risk Factors,-- smoking cessation advice,-- Diet Education (Low Salt, Low Cholesterol, Diabetic,-- Activity/Exercise Guidelines,-- Signs And Symptoms Of Stroke/Call 911-- and-- Patient/Family Received Education Materials  Chief Complaint  Chief Complaint Free Text Note Form: Patient presents for a hospital follow-up for stroke  History of Present Illness  HPI: Mr Beryl Shaw is a 68 yo male seen in follow up for 9/15/2017 left lenticular nucleus stroke with right sided paresis  States symptoms resolved the same day  He did get IV TPA per EPIC review and wife present with him today  no residual symptoms now  tells me he had another stroke in the past years ago- non bleeding per wife secondary to a cerebral aneurysm that has since been clipped 2-3 years ago with no issues with this  tells me since then he has had some memory issues- mild but he is able to do ADLs without difficulty per her and him  me he is active physically doing tasks around the house and walks regularly, wife agrees  talked about healthy eating and diet- has HLD and DM  denies headaches  denies falls  use- stopped 20 years ago  Hx liver transplant 2003 and no alcohol use since  Does have HLD- states is watching his diet  DM moderately controlled- watching his diet  stable- has some short term memory problems after the first stroke related to aneurysm s/p clipping  No seizures  history brain bleeds  Denies blood clots  history of CA         Review of Systems  Neurological ROS:   Constitutional: no fever, no chills, no recent weight gain, no recent weight loss, no complaints of feeling tired, no changes in appetite  HEENT: blurred vision  Cardiovascular:  no chest pain or pressure, no palpitations present, the heart rate was not rapid or irregular, no swelling in the arms or legs, no poor circulation  Respiratory:  no unusual or persistant cough, no shortness of breath with or without exertion  Gastrointestinal:  no nausea, no vomiting, no diarrhea, no abdominal pain, no changes in bowel habits, no melena, no loss of bowel control  Genitourinary: feelings of urinary urgency     Musculoskeletal: arthralgias-- and-- myalgias  Integumentary  no masses, no rash, no skin lesions, no livedo reticularis  Psychiatric:  no anxiety, no depression, no mood swings, no psychiatric hospitalizations, no sleep problems  Endocrine loss of sexual ability or drive -- and-- erection difficulty  Hematologic/Lymphatic: a tendency for easy bruising  Neurological General: headache-- and-- snoring  Neurological Mental Status: memory problems  Neurological Cranial Nerves:  no blurry or double vision, no loss of vision, no face drooping, no facial numbness or weakness, no taste or smell loss/changes, no hearing loss or ringing, no vertigo or dizziness, no dysphagia, no slurred speech  Neurological Motor findings include:  no tremor, no twitching, no cramping(pre/post exercise), no atrophy  Neurological Coordination:  no unsteadiness, no vertigo or dizziness, no clumsiness, no problems reaching for objects  Neurological Sensory:  no numbness, no pain, no tingling, does not fall when eyes closed or taking a shower  Neurological Gait:  no difficulty walking, not falling to one side, no sensation of being pushed, has not had falls  ROS Reviewed:   ROS reviewed  Active Problems  1  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)   2  Aftercare following surgery (V58 89) (Z48 89)   3  Cerebral arterial aneurysm (437 3) (I67 1)   4  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,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 40)   5  CVA (cerebral vascular accident) (434 91) (I63 9)   6  Encounter for screening for cardiovascular disorders (V81 2) (Z13 6)   7  Erectile dysfunction of non-organic origin (302 72) (F52 21)   8  Headache, chronic daily (784 0) (R51)   9  History of CVA (cerebrovascular accident) without residual deficits (V12 54) (Z86 73)   10  Hospital discharge follow-up (V67 59) (Z09)   11  Insomnia (780 52) (G47 00)   12  Liver replaced by transplant (V42 7) (Z94 4)   13  Migraine headache (346 90) (G43 909)   14   Need for prophylactic vaccination against Streptococcus pneumoniae (pneumococcus)    (V03 82) (Z23)   15  Need for prophylactic vaccination and inoculation against influenza (V04 81) (Z23)   16  Need for revaccination (V05 9) (Z23)   17  Occipital neuralgia (723 8) (M54 81)   18  Other cirrhosis of liver (571 5) (K74 69)   19  Peripheral neuropathy (356 9) (G62 9)   20  Postoperative examination (V67 00) (Z09)   21  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)   22  Pruritus (698 9) (L29 9)   23  Radiographic dye allergy status (V15 08) (Z91 041)   24  Renal insufficiency, mild (593 9) (N28 9)   25  Skin lesion of back (709 9) (L98 9)   26  Skin rash (782 1) (R21)   27  Special screening examination for neoplasm of prostate (V76 44) (Z12 5)   28  Spondylosis of cervical region without myelopathy or radiculopathy (721 0) (M47 812)   29  Thrombocytopenia (287 5) (D69 6)   30  Urinary hesitancy (788 64) (R39 11)   31  Vertigo (780 4) (R42)   32  Visit For: Exam Following Treatment At Hospital (V67 59)   33  Vitamin D deficiency (268 9) (E55 9)   34  Well adult on routine health check (V70 0) (Z00 00)    Past Medical History  1  History of Alcoholism (V11 3)   2  History of Change in mental state (780 97) (R41 82)   3  History of Diabetes Mellitus (250 00)   4  History of Drug dependence (304 90) (F19 20)   5  History of Hepatitis, C Virus (070 70)   6  History of Hepatitis, C Virus (070 70)   7  History of acute pharyngitis (V12 69) (Z87 09)   8  History of fatigue (V13 89) (Z87 898)   9  History of headache (V13 89) (Z87 898)   10  History of hyperglycemia (V12 29) (Z86 39)   11  History of kidney disease (V13 09) (Z87 448)   12  History of neoplasm of uncertain behavior of skin (V13 3) (Z86 03)   13  History of Hospital discharge follow-up (V67 59) (Z09)   14  History of Impairment of balance (781 2) (R26 89)   15  History of 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798 4) (V12 46)   16  History of Subdural Hygroma (432 1)   17   History of Visit for pre-operative examination (V72 84) (C49 327)  Active Problems And Past Medical History Reviewed: The active problems and past medical history were reviewed and updated today  Surgical History  1  History of Brain Surgery   2  History of Cataract Surgery   3  History of Cholecystectomy   4  History of Liver Transplant   5  History of Rotator Cuff Repair   6  History of Shoulder Surgery    Family History  Mother    1  Family history of Benign Essential Hypertension  Father    2  Family history of Lung Cancer (V16 1)  Sister    3  Family history of Diabetes Mellitus (V18 0)  Brother    4  Family history of Cancer  Unknown    5  Family history of Cerebrovascular accident (CVA) due to embolism of cerebral artery   6  Family history of cerebrovascular accident (CVA) (V17 1) (Z82 3)  Family History    7  Family history of Family Health Status 6  Children Living   8  Family history of Family Health Status Of 2nd Brother - Alive   5  Family history of Family Health Status Of 2nd Sister - Alive   8  Family history of Family Health Status Of 3rd Brother - Alive   6  Family history of Family Health Status Of 3rd Sister - Alive   15  Family history of Family Health Status Of 4th Sister - Alive   15  Family history of Family Health Status Of 5th Sister - Alive   15  Family history of Family Health Status Of Brother - Alive   15  Family history of Family Health Status Of Father -    12  Family history of Family Health Status Of Mother -    16  Family history of Family Health Status Of Sister - Alive   25  Family history of Maternal Grandfather Is    23  Family history of Maternal Grandmother Is    21  Family history of Paternal Grandfather Is    24  Family history of Paternal Grandmother Is   Family History Reviewed: The family history was reviewed and updated today         Social History   · Denied: History of Alcohol Use (History)   · Caffeine use (V49 89) (F15 90)   · Education History   · Former smoker (V15 82) (P94 290)   · Living With Significant Other   · Marital History - Single   · Physical Disability:   · Remotely quit drug use   · Denied: History of Sexually Active  Social History Reviewed: The social history was reviewed and updated today  Current Meds   1  Aspirin 81 MG Oral Tablet Delayed Release; take 1 tablet every day; Therapy: 25GEC3374 to (Evaluate:30May2018) Recorded   2  Atorvastatin Calcium 40 MG Oral Tablet; TAKE 1 TABLET DAILY; Therapy: 91XYS3675 to (Evaluate:31Mar2018) Recorded   3  Suresh Contour Monitor w/Device Kit; USE AS DIRECTED; Therapy: 82WAK4614 to (Last Rx:12May2017)  Requested for: 63SMZ7284 Ordered   4  Suresh Contour Test In [employer]; TEST 3 TIMES DAILY; Therapy: 59JWZ2259 to (Evaluate:01Jun2018)  Requested for: 19XLA1059; Last   AO:85HVO3648 Ordered   5  Insulin Syringe 29G X 1/2 1 ML Miscellaneous; as directed; Therapy: 82EYO7119 to (Evaluate:12Apr2020)  Requested for: 05RBF8297; Last   Rx:19Jag7137 Ordered   6  Lancets Miscellaneous; Check blood sugar 3 times daily; Therapy: 49RBJ1013 to (Last Rx:12May2017)  Requested for: 36ECA5055 Ordered   7  Lantus 100 UNIT/ML Subcutaneous Solution; ADMINISTER 24 UNITS UNDER THE SKIN   EVERY NIGHT AT BEDTIME; Therapy: 08OHF6269 to )  Requested for: 47VXJ9819; Last   Rx:87Osv6787 Ordered   8  Lyrica 50 MG Oral Capsule; take 1 capsule by mouth three times daily; Therapy: 08Apr2014 to (Dave Fling)  Requested for: 64SSS4511; Last   Rx:04Apr2017 Ordered   9  Nystatin 172334 UNIT/GM External Cream; APPLY 2-3 TIMES DAILY TO AFFECTED   AREA(S); Therapy: 85Zuf4564 to (Last Rx:23Ofs4800)  Requested for: 17Dau9565 Ordered   10  Nystatin-Triamcinolone 880910-5 1 UNIT/GM-% External Cream; Apply sparingly to    affected area (s) twice daily; Therapy: 45MCY2490 to (Last Rx:03Aug2016)  Requested for: 77Zef1290 Ordered   11   Tacrolimus 1 MG Oral Capsule; TAKE 1 CAPSULE BY MOUTH TWICE DAILY; Therapy: 76FDB5433 to (Evaluate:02Apr2018)  Requested for: 43Isg5499; Last    Rx:04Sep2017 Ordered   12  Temazepam 30 MG Oral Capsule; TAKE 1 CAPSULE AT BEDTIME AS NEEDED FOR    SLEEP; Last Rx:09Hjj8408 Ordered   13  TraMADol HCl - 50 MG Oral Tablet; TAKE 2 TABLETS BY MOUTH THREE TIMES DAILY; Therapy: 69Hgb4672 to (Evaluate:07Oct2017)  Requested for: 82Nxv2234; Last    Rx:07Sep2017 Ordered   14  Triamcinolone Acetonide 0 1 % External Cream; APPLY  AND RUB  IN A THIN FILM TO    AFFECTED AREAS TWICE DAILY  (AM AND PM); Therapy: 39Oaz4968 to (Last Rx:07Sep2017)  Requested for: 07Sep2017 Ordered   15  Triamcinolone Acetonide 0 1 % External Cream; APPLY SPARINGLY AND RUB IN WELL    TO AFFECTED AREA(S) TWICE DAILY  NOT FOR FACE, BREAST OR GROIN;    Therapy: 82Nbc6274 to (Last Rx:29Nov2016)  Requested for: 29Nov2016 Ordered   16  Zolpidem Tartrate 10 MG Oral Tablet; take 1 tablet daily at bedtime; Last Rx:38Wmj8932    Ordered   17  ZyrTEC Allergy 10 MG Oral Tablet; take 1 tablet daily as needed; Therapy: 34Tru1964 to (Evaluate:10Jan2018)  Requested for: 95FNZ2201; Last    Rx:55Irh6424 Ordered    Allergies  1  Cipro TABS   2  Iodinated Contrast Media   3  Tequin TABS  4  IVP Dye    Vitals  Signs   Recorded: 99SPR5229 10:56AM   Heart Rate: 80  Respiration: 16  Systolic: 962  Diastolic: 68  Height: 5 ft 3 2 in  Weight: 131 lb   BMI Calculated: 23 06  BSA Calculated: 1 62  O2 Saturation: 95    Physical Exam    Constitutional   General appearance: No acute distress, well appearing and well nourished  Eyes   Ophthalmoscopic examination: Vision is grossly normal  Gross visual field testing by confrontation shows no abnormalities  EOMI in both eyes  Conjunctivae clear  Eyelids normal palpebral fissures equal  Orbits exhibit normal position  No discharge from the eyes  PERRL  Musculoskeletal   Gait and station: Abnormal  -- cautious  Muscle strength: Normal strength throughout      Muscle tone: No atrophy, abnormal movements, flaccidity, cogwheeling or spasticity  Neurologic   Orientation to person, place, and time: Abnormal  -- Does not know month but knows year, time, location, president, season  Recent and remote memory: Abnormal     Attention span and concentration: Normal thought process and attention span  Language: Names objects, able to repeat phrases and speaks spontaneously  Fund of knowledge: Normal vocabulary with appropriate knowledge of current events and past history  2nd cranial nerve: Normal     3rd, 4th, and 6th cranial nerves: Normal     5th cranial nerve: Normal     7th cranial nerve: Normal     8th cranial nerve: Normal     9th cranial nerve: Normal     11th cranial nerve: Normal     12th cranial nerve: Normal     Sensation: Normal     Reflexes: Abnormal  -- Trace hyperreflexia 2+/4 right biceps and patellar and rest hyporeflexic  Coordination: Normal        Future Appointments    Date/Time Provider Specialty Site   11/28/2017 02:00 PM KEON Abbott   Nephrology [address]   12/05/2017 04:15 PM Jose Luciano DO [facility] [address] [facility]     Signatures   Electronically signed by : Gerson Taveras MD; Oct 27 2017  2:12PM EST                       (Author)

## 2017-11-17 ENCOUNTER — APPOINTMENT (OUTPATIENT)
Dept: LAB | Facility: HOSPITAL | Age: 67
End: 2017-11-17
Payer: MEDICARE

## 2017-11-17 ENCOUNTER — TRANSCRIBE ORDERS (OUTPATIENT)
Dept: LAB | Facility: HOSPITAL | Age: 67
End: 2017-11-17

## 2017-11-17 DIAGNOSIS — R79.1 ABNORMAL COAGULATION PROFILE: ICD-10-CM

## 2017-11-17 DIAGNOSIS — C22.1 MALIGNANT NEOPLASM OF INTRAHEPATIC BILE DUCTS (HCC): ICD-10-CM

## 2017-11-17 DIAGNOSIS — Z94.4 LIVER REPLACED BY TRANSPLANT (HCC): ICD-10-CM

## 2017-11-17 DIAGNOSIS — Z94.4 LIVER REPLACED BY TRANSPLANT (HCC): Primary | ICD-10-CM

## 2017-11-17 DIAGNOSIS — D68.9 BLOOD CLOTTING DISORDER (HCC): ICD-10-CM

## 2017-11-17 LAB
ALBUMIN SERPL BCP-MCNC: 3.8 G/DL (ref 3.5–5)
ALP SERPL-CCNC: 87 U/L (ref 46–116)
ALT SERPL W P-5'-P-CCNC: 18 U/L (ref 12–78)
ANION GAP SERPL CALCULATED.3IONS-SCNC: 3 MMOL/L (ref 4–13)
AST SERPL W P-5'-P-CCNC: 21 U/L (ref 5–45)
BASOPHILS # BLD AUTO: 0.02 THOUSANDS/ΜL (ref 0–0.1)
BASOPHILS NFR BLD AUTO: 0 % (ref 0–1)
BILIRUB SERPL-MCNC: 0.43 MG/DL (ref 0.2–1)
BUN SERPL-MCNC: 27 MG/DL (ref 5–25)
CALCIUM SERPL-MCNC: 9.3 MG/DL (ref 8.3–10.1)
CHLORIDE SERPL-SCNC: 106 MMOL/L (ref 100–108)
CO2 SERPL-SCNC: 29 MMOL/L (ref 21–32)
CREAT SERPL-MCNC: 1.69 MG/DL (ref 0.6–1.3)
EOSINOPHIL # BLD AUTO: 0.6 THOUSAND/ΜL (ref 0–0.61)
EOSINOPHIL NFR BLD AUTO: 9 % (ref 0–6)
ERYTHROCYTE [DISTWIDTH] IN BLOOD BY AUTOMATED COUNT: 14.6 % (ref 11.6–15.1)
GFR SERPL CREATININE-BSD FRML MDRD: 41 ML/MIN/1.73SQ M
GGT SERPL-CCNC: 27 U/L (ref 5–85)
GLUCOSE P FAST SERPL-MCNC: 117 MG/DL (ref 65–99)
HCT VFR BLD AUTO: 42.4 % (ref 36.5–49.3)
HGB BLD-MCNC: 13.5 G/DL (ref 12–17)
INR PPP: 1.04 (ref 0.86–1.16)
LYMPHOCYTES # BLD AUTO: 2.43 THOUSANDS/ΜL (ref 0.6–4.47)
LYMPHOCYTES NFR BLD AUTO: 36 % (ref 14–44)
MAGNESIUM SERPL-MCNC: 2 MG/DL (ref 1.6–2.6)
MCH RBC QN AUTO: 24.6 PG (ref 26.8–34.3)
MCHC RBC AUTO-ENTMCNC: 31.8 G/DL (ref 31.4–37.4)
MCV RBC AUTO: 77 FL (ref 82–98)
MONOCYTES # BLD AUTO: 0.54 THOUSAND/ΜL (ref 0.17–1.22)
MONOCYTES NFR BLD AUTO: 8 % (ref 4–12)
NEUTROPHILS # BLD AUTO: 3.2 THOUSANDS/ΜL (ref 1.85–7.62)
NEUTS SEG NFR BLD AUTO: 47 % (ref 43–75)
NRBC BLD AUTO-RTO: 0 /100 WBCS
PMV BLD AUTO: 11.7 FL (ref 8.9–12.7)
POTASSIUM SERPL-SCNC: 4.7 MMOL/L (ref 3.5–5.3)
PROT SERPL-MCNC: 8.6 G/DL (ref 6.4–8.2)
PROTHROMBIN TIME: 13.6 SECONDS (ref 12.1–14.4)
RBC # BLD AUTO: 5.49 MILLION/UL (ref 3.88–5.62)
SODIUM SERPL-SCNC: 138 MMOL/L (ref 136–145)
WBC # BLD AUTO: 6.81 THOUSAND/UL (ref 4.31–10.16)

## 2017-11-17 PROCEDURE — 83735 ASSAY OF MAGNESIUM: CPT

## 2017-11-17 PROCEDURE — 85610 PROTHROMBIN TIME: CPT

## 2017-11-17 PROCEDURE — 36415 COLL VENOUS BLD VENIPUNCTURE: CPT

## 2017-11-17 PROCEDURE — 80197 ASSAY OF TACROLIMUS: CPT

## 2017-11-17 PROCEDURE — 80053 COMPREHEN METABOLIC PANEL: CPT

## 2017-11-17 PROCEDURE — 85025 COMPLETE CBC W/AUTO DIFF WBC: CPT

## 2017-11-17 PROCEDURE — 82977 ASSAY OF GGT: CPT

## 2017-11-19 LAB — TACROLIMUS BLD LC/MS/MS-MCNC: 3.2 NG/ML (ref 2–20)

## 2017-11-28 ENCOUNTER — ALLSCRIPTS OFFICE VISIT (OUTPATIENT)
Dept: OTHER | Facility: OTHER | Age: 67
End: 2017-11-28

## 2017-11-29 NOTE — PROGRESS NOTES
Assessment  1  Renal insufficiency, mild (593 9) (N28 9)    Plan  History of CVA (cerebrovascular accident) without residual deficits    · Atorvastatin Calcium 40 MG Oral Tablet   Dispense: 30 Days ; #:30 Tablet; Refill: 5;For: History of CVA (cerebrovascular accident) without residual deficits; BOUBACAR = N; Record; Last Updated By: Cheikh Hollis; 11/28/2017 2:16:17 PM  Renal insufficiency, mild    · (1) BASIC METABOLIC PROFILE; Status:Active; Requested for:02Apr2018; Perform:[facility]; CDB:28YGQ3789;BNFDIEP;GGDSRGXJXUWVB, mild; Ordered By:Luis Dickey;   as we discussed the creatinine which is a blood test your kidney function was 1 6 and going back over 3 years it has been relatively stable so it has not gotten worse  It is likely related to little bit a aging nephrosclerosis and potentially a little bit related to the tacrolimus medication but that of course is necessary  Continue current medications this is not got worse blood pressure is excellent follow-up as scheduled  Discussion/Summary    Patient has chronic renal insufficiency likely due to nephrosclerosis with 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  His creatinine stable at 1 6 and over last few years has not progressed so there is not an aggressive decline  Continue with good blood pressure control and monitor  Overall patient is doing great clinically after he suffered a stroke and states he has no residual deficit  He does need to talk to his doctor about a new lipid lowering agent as he had a rash related to  atorvastatin and has stopped this medication  Follow-up as scheduled with repeat labs  Reason For Visit  You are here to follow up in monitor your kidney function      History of Present Illness  The patient is here for routine follow-up  Since last seen he was doing okay but he was started on atorvastatin and actually had allergic reaction so stopped it    He also had a stroke with right-sided weakness but all the residuals have resolved and he is following with Neurology now  Review of Systems   Constitutional: fatigue, but-- no fever,-- no chills-- and-- no anorexia  Gastrointestinal: no abdominal pain,-- no nausea,-- no diarrhea-- and-- no vomiting  Respiratory: no shortness of breath,-- no cough-- and-- not coughing up sputum  Cardiovascular: no orthopnea,-- no chest pain,-- no palpitations-- and-- no lower extremity edema  Musculoskeletal: Body aches  Neurological: No complaints of headache, no lightheadedness or dizziness  Genitourinary: nocturia, but-- no dysuria,-- no hematuria-- and-- no incomplete emptying of bladder  Current Meds   1  Aspirin 81 MG Oral Tablet Delayed Release; take 1 tablet every day; Therapy: 96ZKA1283 to (Evaluate:30May2018) Recorded   2  Atorvastatin Calcium 40 MG Oral Tablet; TAKE 1 TABLET DAILY; Therapy: 48NMT0282 to (Evaluate:31Mar2018) Recorded   3  Suresh Contour Monitor w/Device Kit; USE AS DIRECTED; Therapy: 82WRU4240 to (Last Rx:12May2017)  Requested for: 20FDO0510 Ordered   4  Suresh Contour Test In [employer]; TEST 3 TIMES DAILY; Therapy: 54BEK9526 to (Evaluate:01Jun2018)  Requested for: 45PSN5168; Last OS:90CLP4613 Ordered   5  Insulin Syringe 29G X 1/2 1 ML Miscellaneous; as directed; Therapy: 97CCS1683 to (Evaluate:12Apr2020)  Requested for: 84TNP5398; Last Rx:04Bgd3398 Ordered   6  Lancets Miscellaneous; Check blood sugar 3 times daily; Therapy: 11TYD9120 to (Last Rx:12May2017)  Requested for: 27UOT6946 Ordered   7  Lantus 100 UNIT/ML Subcutaneous Solution; ADMINISTER 24 UNITS UNDER THE SKIN EVERY NIGHT AT BEDTIME; Therapy: 14PZZ6868 to [phone])  Requested for: 95ICR5131; Last Rx:96Ieo9904 Ordered   8  Lyrica 50 MG Oral Capsule; take 1 capsule by mouth three times daily; Therapy: 08Apr2014 to (Lorrene Prom)  Requested for: 31QIT6535; Last Rx:07Nov2017 Ordered   9   Nystatin 935699 UNIT/GM External Cream; APPLY 2-3 TIMES DAILY TO AFFECTED AREA(S); Therapy: 30Agu4254 to (Last Rx:08Aug2016)  Requested for: 36Wpg6825 Ordered   10  Nystatin-Triamcinolone 389803-4 1 UNIT/GM-% External Cream; Apply sparingly to  affected area (s) twice daily; Therapy: 95TTU6614 to (Last Rx:44Jqg4140)  Requested for: 66Yaq7347 Ordered   11  Tacrolimus 1 MG Oral Capsule; TAKE 1 CAPSULE BY MOUTH TWICE DAILY; Therapy: 62AYJ2281 to (Evaluate:02Apr2018)  Requested for: 80Vbz4733; Last  Rx:32Odg6870 Ordered   12  Temazepam 30 MG Oral Capsule; TAKE 1 CAPSULE AT BEDTIME AS NEEDED FOR  SLEEP; Last Rx:01Aug2017 Ordered   13  TraMADol HCl - 50 MG Oral Tablet; TAKE 2 TABLETS BY MOUTH THREE TIMES DAILY; Therapy: 26Jgy3475 to (Pineda Jordan)  Requested for: 27Oct2017; Last  Rx:27Oct2017 Ordered   14  Triamcinolone Acetonide 0 1 % External Cream; APPLY  AND RUB  IN A THIN FILM TO  AFFECTED AREAS TWICE DAILY  (AM AND PM); Therapy: 95Ibt9751 to (Last Rx:07Sep2017)  Requested for: 07Sep2017 Ordered   15  Triamcinolone Acetonide 0 1 % External Cream; APPLY SPARINGLY AND RUB IN WELL  TO AFFECTED AREA(S) TWICE DAILY  NOT FOR FACE, BREAST OR GROIN;  Therapy: 08Aug2016 to (Last Rx:29Nov2016)  Requested for: 29Nov2016 Ordered   16  Zolpidem Tartrate 10 MG Oral Tablet; take 1 tablet daily at bedtime; Last Rx:71Sjm1085  Ordered   17  ZyrTEC Allergy 10 MG Oral Tablet; take 1 tablet daily as needed; Therapy: 69Chk7007 to (Evaluate:10Jan2018)  Requested for: 76UET5555; Last  Rx:70Bmc2916 Ordered    The medication list was reviewed and updated today  Allergies  1  Cipro TABS   2  Iodinated Contrast Media   3  Tequin TABS  4  IVP Dye    Vitals  Vital Signs    Recorded: 28Nov2017 02:28PM Recorded: 81PBG5691 02:00PM   Heart Rate 78    Respiration 16    Systolic 895    Diastolic 80    Height  5 ft 3 2 in   Weight  133 lb    BMI Calculated  23 41   BSA Calculated  1 63       Physical Exam   Constitutional: General appearance: No acute distress, well appearing and well nourished  ENT: External ears and nose appear normal     Eyes: Anicteric sclerae  JVD:  No JVD present  Pulmonary: Respiratory effort: No increased work of breathing or signs of respiratory distress  -- Auscultation of lungs: Clear to auscultation  Cardiovascular: Auscultation of heart: Normal rate and rhythm, normal S1 and S2, without murmurs  Abdomen: Non-tender, no masses  Extremities: No cyanosis, clubbing or edema  Neurologic: Non Focal     Psychiatric: Orientation to person, place, and time: Normal        Results/Data  (1) PT WITH INR [id number] 09:42AM EPIC, Provider   Test ordered by: Daniel Liza     Test Name Result Flag Reference   INR 1 04  0 86-1 16   PT 13 6 seconds  12 1-14 4     (1) MAGNESIUM [id number] 09:42AM EPIC, Provider   Test ordered by: Daniel Liza     Test Name Result Flag Reference   MAGNESIUM 2 0 mg/dL  1 6-2 6     (1) COMPREHENSIVE METABOLIC PANEL 49CRM6899 12:09QS EPIC, Provider   Test ordered by: Mauro Peoples Name Result Flag Reference   SODIUM 138 mmol/L  136-145   POTASSIUM 4 7 mmol/L  3 5-5 3   CHLORIDE 106 mmol/L  100-108   CARBON DIOXIDE 29 mmol/L  21-32   ANION GAP (CALC) 3 mmol/L L 4-13   BLOOD UREA NITROGEN 27 mg/dL H 5-25   CREATININE 1 69 mg/dL H 0 60-1 30   Standardized to IDMS reference method   CALCIUM 9 3 mg/dL  8 3-10 1   BILI, TOTAL 0 43 mg/dL  0 20-1 00   ALK PHOSPHATAS 87 U/L     ALT (SGPT) 18 U/L  12-78   Specimen collection should occur prior to Sulfasalazine and/or Sulfapyridine administration due to the potential for falsely depressed results  AST(SGOT) 21 U/L  5-45   Specimen collection should occur prior to Sulfasalazine administration due to the potential for falsely depressed results     ALBUMIN 3 8 g/dL  3 5-5 0   TOTAL PROTEIN 8 6 g/dL H 6 4-8 2   eGFR 41 ml/min/1 73sq m       National Kidney Disease Education Program recommendations are as follows: GFR calculation is accurate only with a steady state creatinine Chronic Kidney disease less than 60 ml/min/1 73 sq  meters Kidney failure less than 15 ml/min/1 73 sq  meters  GLUCOSE FASTING 117 mg/dL H 65-99   Specimen collection should occur prior to Sulfasalazine administration due to the potential for falsely depressed results  Specimen collection should occur prior to Sulfapyridine administration due to the potential for falsely elevated results  (1) GGT [id number] 09:42AM EPIC, Provider   Test ordered by: [address] Name Result Flag Reference   GGT 27 U/L  5-85     (1) CBC/PLT/DIFF [id number] 09:42AM [facility], Provider   Test ordered by: [address] Name Result Flag Reference   WBC COUNT 6 81 Thousand/uL  4 31-10 16   RBC COUNT 5 49 Million/uL  3 88-5 62   HEMOGLOBIN 13 5 g/dL  12 0-17 0   HEMATOCRIT 42 4 %  36 5-49 3   MCV 77 fL L 82-98   MCH 24 6 pg L 26 8-34 3   MCHC 31 8 g/dL  31 4-37 4   RDW 14 6 %  11 6-15 1   MPV 11 7 fL  8 9-12 7   PLATELET COUNT   759-405   Not reported due to platelet clumping  Thousands/uL   Not reported due to platelet clumping  nRBC AUTOMATED 0 /100 WBCs     NEUTROPHILS RELATIVE PERCENT 47 %  43-75   LYMPHOCYTES RELATIVE PERCENT 36 %  14-44   MONOCYTES RELATIVE PERCENT 8 %  4-12   EOSINOPHILS RELATIVE PERCENT 9 % H 0-6   BASOPHILS RELATIVE PERCENT 0 %  0-1   NEUTROPHILS ABSOLUTE COUNT 3 20 Thousands/? ??L  1 85-7 62   LYMPHOCYTES ABSOLUTE COUNT 2 43 Thousands/? ??L  0 60-4 47   MONOCYTES ABSOLUTE COUNT 0 54 Thousand/? ??L  0 17-1 22   EOSINOPHILS ABSOLUTE COUNT 0 60 Thousand/? ??L  0 00-0 61   BASOPHILS ABSOLUTE COUNT 0 02 Thousands/? ??L  0 00-0 10     This is a patient instruction: This test is non-fasting  Please drink two glasses of water morning of bloodwork       rvw [id number]< br>no clots     (1)  TACROLIMUS [id number] 09:42AM EPIC, Provider   Test ordered by: Edil Alex     Test Name Result Flag Reference    3 2 ng/mL  2 0 - 20 0     Trough (immediately following                 transplant)                       15 0 Trough (steady state, 2 weeks or                 more after transplant):      3 0 - 8 0                                  Detection Limit = 1 0         Performed by LC-MS/MS technology  Performed at:  [address]  [id number] : Franky Barth MD, Phone:  [phone]       Health Management  Health Maintenance   COLONOSCOPY; every 10 years; Last 67SQW6165; Next Due: 56Ttm7227; Active    Future Appointments    Date/Time Provider Specialty Site   12/05/2017 04:15 PM Damari Kumar DO Family Medicine [address] [facility]       Signatures   Electronically signed by :  KEON Morales ; Nov 28 2017  2:30PM EST                       (Author)

## 2017-12-05 ENCOUNTER — GENERIC CONVERSION - ENCOUNTER (OUTPATIENT)
Dept: OTHER | Facility: OTHER | Age: 67
End: 2017-12-05

## 2017-12-06 ENCOUNTER — TRANSCRIBE ORDERS (OUTPATIENT)
Dept: LAB | Facility: HOSPITAL | Age: 67
End: 2017-12-06

## 2017-12-06 ENCOUNTER — APPOINTMENT (OUTPATIENT)
Dept: LAB | Facility: HOSPITAL | Age: 67
End: 2017-12-06
Payer: MEDICARE

## 2017-12-06 ENCOUNTER — GENERIC CONVERSION - ENCOUNTER (OUTPATIENT)
Dept: OTHER | Facility: OTHER | Age: 67
End: 2017-12-06

## 2017-12-06 DIAGNOSIS — E55.9 VITAMIN D DEFICIENCY: ICD-10-CM

## 2017-12-06 DIAGNOSIS — D69.6 THROMBOCYTOPENIA (HCC): ICD-10-CM

## 2017-12-06 DIAGNOSIS — E11.40 TYPE 2 DIABETES MELLITUS WITH DIABETIC NEUROPATHY (HCC): ICD-10-CM

## 2017-12-06 LAB
25(OH)D3 SERPL-MCNC: 24.7 NG/ML (ref 30–100)
ALBUMIN SERPL BCP-MCNC: 3.7 G/DL (ref 3.5–5)
ALP SERPL-CCNC: 95 U/L (ref 46–116)
ALT SERPL W P-5'-P-CCNC: 18 U/L (ref 12–78)
ANION GAP SERPL CALCULATED.3IONS-SCNC: 5 MMOL/L (ref 4–13)
AST SERPL W P-5'-P-CCNC: 21 U/L (ref 5–45)
BILIRUB SERPL-MCNC: 0.27 MG/DL (ref 0.2–1)
BUN SERPL-MCNC: 20 MG/DL (ref 5–25)
CALCIUM SERPL-MCNC: 9.2 MG/DL (ref 8.3–10.1)
CHLORIDE SERPL-SCNC: 104 MMOL/L (ref 100–108)
CHOLEST SERPL-MCNC: 207 MG/DL (ref 50–200)
CO2 SERPL-SCNC: 29 MMOL/L (ref 21–32)
CREAT SERPL-MCNC: 1.71 MG/DL (ref 0.6–1.3)
EST. AVERAGE GLUCOSE BLD GHB EST-MCNC: 183 MG/DL
GFR SERPL CREATININE-BSD FRML MDRD: 40 ML/MIN/1.73SQ M
GLUCOSE P FAST SERPL-MCNC: 174 MG/DL (ref 65–99)
HBA1C MFR BLD: 8 % (ref 4.2–6.3)
HDLC SERPL-MCNC: 47 MG/DL (ref 40–60)
LDLC SERPL CALC-MCNC: 118 MG/DL (ref 0–100)
POTASSIUM SERPL-SCNC: 4.6 MMOL/L (ref 3.5–5.3)
PROT SERPL-MCNC: 8.5 G/DL (ref 6.4–8.2)
SODIUM SERPL-SCNC: 138 MMOL/L (ref 136–145)
TRIGL SERPL-MCNC: 209 MG/DL
TSH SERPL DL<=0.05 MIU/L-ACNC: 2.77 UIU/ML (ref 0.36–3.74)

## 2017-12-06 PROCEDURE — 80053 COMPREHEN METABOLIC PANEL: CPT

## 2017-12-06 PROCEDURE — 84443 ASSAY THYROID STIM HORMONE: CPT

## 2017-12-06 PROCEDURE — 80061 LIPID PANEL: CPT

## 2017-12-06 PROCEDURE — 82306 VITAMIN D 25 HYDROXY: CPT

## 2017-12-06 PROCEDURE — 36415 COLL VENOUS BLD VENIPUNCTURE: CPT

## 2017-12-06 PROCEDURE — 83036 HEMOGLOBIN GLYCOSYLATED A1C: CPT

## 2018-01-10 NOTE — RESULT NOTES
Message   Recorded as Task   Date: 04/06/2016 12:23 PM, Created By: Latricia Jacob   Task Name: Follow Up   Assigned To: Alka Cruz   Regarding Patient: Óscar Benson, Status: Active   Comment:    Latricia Jacob - 06 Apr 2016 12:23 PM     TASK CREATED  Patient appeared to have significant relief, if he agrees, can we schedule for RFA? Melania Paci - 08 Apr 2713 10:83 AM     TASK EDITED  Confirmed with patient he did get relief and advised would be contacted by  to be scheduled for RFA     Please call patient to schedule     Lorraine Hermosillo - 11 Apr 2016 10:56 AM     TASK REASSIGNED: Previously Assigned To [facility] surgery sched,Team   Lorraine Hermosillo - 12 Apr 2016 10:42 AM     TASK EDITED  S/W patient - patient scheduled for Wednesday, April 27 at [place] with Dr Buddy Casas - patient advised nothing to eat or drink 1 hour prior to procedure but encouraged to have a light breakfast - patient denies any blood thinners/abx's - patient also advised to arrange for  - patient aware and agreed    Patient also scheduled for 4 wk f/u post RFA for Thursday, May 26        Signatures   Electronically signed by : Lacey Ho, ; Apr 12 2016 10:43AM EST                       (Author)

## 2018-01-11 NOTE — CONSULTS
Reason For Visit  You are here to follow up in monitor your kidney function        History of Present Illness  The patient is here for routine follow-up  Since last seen he was doing okay but he was started on atorvastatin and actually had allergic reaction so stopped it  He also had a stroke with right-sided weakness but all the residuals have resolved and he is following with Neurology now  Review of Systems    Constitutional: fatigue, but no fever, no chills and no anorexia  Gastrointestinal: no abdominal pain, no nausea, no diarrhea and no vomiting  Respiratory: no shortness of breath, no cough and not coughing up sputum  Cardiovascular: no orthopnea, no chest pain, no palpitations and no lower extremity edema  Musculoskeletal: Body aches  Neurological: No complaints of headache, no lightheadedness or dizziness  Genitourinary: nocturia, but no dysuria, no hematuria and no incomplete emptying of bladder  Current Meds   1  Aspirin 81 MG Oral Tablet Delayed Release; take 1 tablet every day; Therapy: 88LVX8966 to (Evaluate:30May2018) Recorded   2  Atorvastatin Calcium 40 MG Oral Tablet; TAKE 1 TABLET DAILY; Therapy: 16ONO6992 to (Evaluate:31Mar2018) Recorded   3  Suresh Contour Monitor w/Device Kit; USE AS DIRECTED; Therapy: 23BOQ8518 to (Last Rx:12May2017)  Requested for: 46HHU3947 Ordered   4  Suresh Contour Test In [employer]; TEST 3 TIMES DAILY; Therapy: 08JHM1860 to (Evaluate:01Jun2018)  Requested for: 31IKA5144; Last   VJ:58XOT5861 Ordered   5  Insulin Syringe 29G X 1/2" 1 ML Miscellaneous; as directed; Therapy: 24BOB7224 to (Evaluate:12Apr2020)  Requested for: 47RYD9170; Last   Rx:14Oct2016 Ordered   6  Lancets Miscellaneous; Check blood sugar 3 times daily; Therapy: 72OET5206 to (Last Rx:12May2017)  Requested for: 79HET1186 Ordered   7  Lantus 100 UNIT/ML Subcutaneous Solution; ADMINISTER 24 UNITS UNDER THE SKIN   EVERY NIGHT AT BEDTIME;    Therapy: 82OXW3624 to (GFLWDVGF:66BWI3258)  Requested for: 12ITY1283; Last   Rx:49Sky8531 Ordered   8  Lyrica 50 MG Oral Capsule; take 1 capsule by mouth three times daily; Therapy: 08Apr2014 to (Luke Coffee)  Requested for: 67INT8698; Last   Rx:07Nov2017 Ordered   9  Nystatin 891777 UNIT/GM External Cream; APPLY 2-3 TIMES DAILY TO AFFECTED   AREA(S); Therapy: 06Cdr3877 to (Last Rx:08Aug2016)  Requested for: 68Vfs9641 Ordered   10  Nystatin-Triamcinolone 573624-3 1 UNIT/GM-% External Cream; Apply sparingly to    affected area (s) twice daily; Therapy: 86ERE4678 to (Last Rx:03Aug2016)  Requested for: 51Mvq7380 Ordered   11  Tacrolimus 1 MG Oral Capsule; TAKE 1 CAPSULE BY MOUTH TWICE DAILY; Therapy: 92JLF3653 to (Evaluate:02Apr2018)  Requested for: 18Fvm3670; Last    Rx:48Mbm4029 Ordered   12  Temazepam 30 MG Oral Capsule; TAKE 1 CAPSULE AT BEDTIME AS NEEDED FOR    SLEEP; Last Rx:00Bmk9885 Ordered   13  TraMADol HCl - 50 MG Oral Tablet; TAKE 2 TABLETS BY MOUTH THREE TIMES DAILY; Therapy: 19Swi3733 to (Tad Part)  Requested for: 27Oct2017; Last    Rx:27Oct2017 Ordered   14  Triamcinolone Acetonide 0 1 % External Cream; APPLY  AND RUB  IN A THIN FILM TO    AFFECTED AREAS TWICE DAILY  (AM AND PM); Therapy: 12Akd0879 to (Last Rx:07Sep2017)  Requested for: 07Sep2017 Ordered   15  Triamcinolone Acetonide 0 1 % External Cream; APPLY SPARINGLY AND RUB IN WELL    TO AFFECTED AREA(S) TWICE DAILY  NOT FOR FACE, BREAST OR GROIN;    Therapy: 55Elf7065 to (Last Rx:29Nov2016)  Requested for: 29Nov2016 Ordered   16  Zolpidem Tartrate 10 MG Oral Tablet; take 1 tablet daily at bedtime; Last Rx:88Wcz5378    Ordered   17  ZyrTEC Allergy 10 MG Oral Tablet; take 1 tablet daily as needed; Therapy: 38Fkc7728 to (Evaluate:10Jan2018)  Requested for: 66PQX4842; Last    Rx:23Pnq0588 Ordered    The medication list was reviewed and updated today  Allergies    1  Cipro TABS   2  Iodinated Contrast Media   3  Tequin TABS    4   IVP Dye    Vitals   Recorded: 975 97 287 02:28PM Recorded: 47XIV4432 02:00PM   Heart Rate 78    Respiration 16    Systolic 964    Diastolic 80    Height  5 ft 3 2 in   Weight  133 lb    BMI Calculated  23 41   BSA Calculated  1 63     Physical Exam    Constitutional: General appearance: No acute distress, well appearing and well nourished  ENT: External ears and nose appear normal      Eyes: Anicteric sclerae  JVD:  No JVD present  Pulmonary: Respiratory effort: No increased work of breathing or signs of respiratory distress  Auscultation of lungs: Clear to auscultation  Cardiovascular: Auscultation of heart: Normal rate and rhythm, normal S1 and S2, without murmurs  Abdomen: Non-tender, no masses  Extremities: No cyanosis, clubbing or edema  Neurologic: Non Focal      Psychiatric: Orientation to person, place, and time: Normal        Results/Data  (1) PT WITH INR [id number] 09:42AM EPIC, Provider   Test ordered by: Avi Ek     Test Name Result Flag Reference   INR 1 04  0 86-1 16   PT 13 6 seconds  12 1-14 4     (1) MAGNESIUM 54LMO9884 09:42AM EPIC, Provider   Test ordered by: Avi Ek     Test Name Result Flag Reference   MAGNESIUM 2 0 mg/dL  1 6-2 6     (1) COMPREHENSIVE METABOLIC PANEL [id number] 88:03BE EPIC, Provider   Test ordered by: Cheo Lincoln Name Result Flag Reference   SODIUM 138 mmol/L  136-145   POTASSIUM 4 7 mmol/L  3 5-5 3   CHLORIDE 106 mmol/L  100-108   CARBON DIOXIDE 29 mmol/L  21-32   ANION GAP (CALC) 3 mmol/L L 4-13   BLOOD UREA NITROGEN 27 mg/dL H 5-25   CREATININE 1 69 mg/dL H 0 60-1 30   Standardized to IDMS reference method   CALCIUM 9 3 mg/dL  8 3-10 1   BILI, TOTAL 0 43 mg/dL  0 20-1 00   ALK PHOSPHATAS 87 U/L     ALT (SGPT) 18 U/L  12-78   Specimen collection should occur prior to Sulfasalazine and/or Sulfapyridine administration due to the potential for falsely depressed results     AST(SGOT) 21 U/L  5-45   Specimen collection should occur prior to Sulfasalazine administration due to the potential for falsely depressed results  ALBUMIN 3 8 g/dL  3 5-5 0   TOTAL PROTEIN 8 6 g/dL H 6 4-8 2   eGFR 41 ml/min/1 73sq m     National Kidney Disease Education Program recommendations are as follows:  GFR calculation is accurate only with a steady state creatinine  Chronic Kidney disease less than 60 ml/min/1 73 sq  meters  Kidney failure less than 15 ml/min/1 73 sq  meters  GLUCOSE FASTING 117 mg/dL H 65-99   Specimen collection should occur prior to Sulfasalazine administration due to the potential for falsely depressed results  Specimen collection should occur prior to Sulfapyridine administration due to the potential for falsely elevated results  (1) GGT 26PHE3901 09:42AM EPIC, Provider   Test ordered by: [address] Name Result Flag Reference   GGT 27 U/L  5-85     (1) CBC/PLT/DIFF [id number] 09:42AM EPIC, Provider   Test ordered by: [address] Name Result Flag Reference   WBC COUNT 6 81 Thousand/uL  4 31-10 16   RBC COUNT 5 49 Million/uL  3 88-5 62   HEMOGLOBIN 13 5 g/dL  12 0-17 0   HEMATOCRIT 42 4 %  36 5-49 3   MCV 77 fL L 82-98   MCH 24 6 pg L 26 8-34 3   MCHC 31 8 g/dL  31 4-37 4   RDW 14 6 %  11 6-15 1   MPV 11 7 fL  8 9-12 7   PLATELET COUNT   323-596   Not reported due to platelet clumping  Thousands/uL   Not reported due to platelet clumping  nRBC AUTOMATED 0 /100 WBCs     NEUTROPHILS RELATIVE PERCENT 47 %  43-75   LYMPHOCYTES RELATIVE PERCENT 36 %  14-44   MONOCYTES RELATIVE PERCENT 8 %  4-12   EOSINOPHILS RELATIVE PERCENT 9 % H 0-6   BASOPHILS RELATIVE PERCENT 0 %  0-1   NEUTROPHILS ABSOLUTE COUNT 3 20 Thousands/? ??L  1 85-7 62   LYMPHOCYTES ABSOLUTE COUNT 2 43 Thousands/? ??L  0 60-4 47   MONOCYTES ABSOLUTE COUNT 0 54 Thousand/? ??L  0 17-1 22   EOSINOPHILS ABSOLUTE COUNT 0 60 Thousand/? ??L  0 00-0 61   BASOPHILS ABSOLUTE COUNT 0 02 Thousands/? ??L  0 00-0 10   This is a patient instruction:  This test is non-fasting  Please drink two glasses of water morning of bloodwork  rvw [id number]< br>no clots     (1)  TACROLIMUS [id number] 09:42AM EPIC, Provider   Test ordered by: [address] Name Result Flag Reference    3 2 ng/mL  2 0 - 20 0   Trough (immediately following                  transplant)                       15 0          Trough (steady state, 2 weeks or                  more after transplant):      3 0 - 8 0                                   Detection Limit = 1 0          Performed by LC-MS/MS technology  Performed at:  [address]  [id number]  : Gualberto Erickson MD, Phone:  [phone]       Assessment    1  Renal insufficiency, mild (593 9) (N28 9)    Plan  History of CVA (cerebrovascular accident) without residual deficits    · Atorvastatin Calcium 40 MG Oral Tablet   Dispense: 30 Days ; #:30 Tablet; Refill: 5; For: History of CVA (cerebrovascular accident) without residual deficits; BOUBACAR = N; Record; Last Updated By: Cathi Dandy; 11/28/2017 2:16:17 PM  Renal insufficiency, mild    · (1) BASIC METABOLIC PROFILE; Status:Active; Requested for:02Apr2018; Perform:[facility]; AUI:09RRS7701;SFEWQBG; For:Renal insufficiency, mild; Ordered By:Lenny Dickey;     as we discussed the creatinine which is a blood test your kidney function was 1 6 and going back over 3 years it has been relatively stable so it has not gotten worse  It is likely related to little bit a aging nephrosclerosis and potentially a little bit related to the tacrolimus medication but that of course is necessary  Continue current medications this is not got worse blood pressure is excellent follow-up as scheduled  Discussion/Summary    Patient has chronic renal insufficiency likely due to nephrosclerosis with 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    His creatinine stable at 1 6 and over last few years has not progressed so there is not an aggressive decline  Continue with good blood pressure control and monitor  Overall patient is doing great clinically after he suffered a stroke and states he has no residual deficit  He does need to talk to his doctor about a new lipid lowering agent as he had a rash related to  atorvastatin and has stopped this medication  Follow-up as scheduled with repeat labs  Health Management   COLONOSCOPY; every 10 years; Last 79DOO4094; Next Due: 22Lme1225; Active    Future Appointments    Signatures   Electronically signed by :  KEON Guerrero ; Nov 28 2017  2:30PM EST                       (Author)

## 2018-01-12 VITALS
BODY MASS INDEX: 23.25 KG/M2 | WEIGHT: 131.25 LBS | SYSTOLIC BLOOD PRESSURE: 110 MMHG | RESPIRATION RATE: 18 BRPM | HEIGHT: 63 IN | DIASTOLIC BLOOD PRESSURE: 70 MMHG | HEART RATE: 74 BPM

## 2018-01-12 NOTE — MISCELLANEOUS
Assessment    1  Hospital discharge follow-up (V67 59) (Z09)   2  CVA (cerebral vascular accident) (434 91) (I63 9)   3  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,357 2) (E11 40)   4  Need for prophylactic vaccination and inoculation against influenza (V04 81) (Z23)    Plan  Controlled type 2 diabetes with neuropathy, Thrombocytopenia, Vitamin D deficiency    · (1) COMPREHENSIVE METABOLIC PANEL; Status:Active; Requested DBY:32VZT3459;    Perform:[facility]; IJK:51OQO3320; Ordered; For:Controlled type 2 diabetes with neuropathy, Thrombocytopenia, Vitamin D deficiency; Ordered By:Dasia Capps;   · (1) HEMOGLOBIN A1C; Status:Active; Requested PZJ:32VMQ6585;    Perform:[facility]; RII:27MQI6041; Ordered; For:Controlled type 2 diabetes with neuropathy, Thrombocytopenia, Vitamin D deficiency; Ordered By:Dasia Capps;   · (1) LIPID PANEL FASTING W DIRECT LDL REFLEX; Status:Active; Requested  QPT:32RME8115;    Perform:[facility]; HKB:09JBZ3593; Ordered; For:Controlled type 2 diabetes with neuropathy, Thrombocytopenia, Vitamin D deficiency; Ordered By:Dasia Capps;   · (1) TSH WITH FT4 REFLEX; Status:Active; Requested SWH:67LTJ6473;    Perform:[facility]; UFL:36PBS1627; Ordered; For:Controlled type 2 diabetes with neuropathy, Thrombocytopenia, Vitamin D deficiency; Ordered By:Dasia Capps;   · (1) VITAMIN D 25-HYDROXY; Status:Active; Requested ATD:43GAT8049;    Perform:[facility]; KQE:20VXI9596; Ordered; For:Controlled type 2 diabetes with neuropathy, Thrombocytopenia, Vitamin D deficiency; Ordered By:Rock Capps; Need for prophylactic vaccination and inoculation against influenza    · Fluzone High-Dose 0 5 ML Intramuscular Suspension Prefilled Syringe;  INJECT 0 5  ML Intramuscular; To Be Done: 99LZY4322   For: Need for prophylactic vaccination and inoculation against influenza;  Ordered By:Dasia Capps; Effective Date:02Oct2017  Occipital neuralgia    · Lyrica 50 MG Oral Capsule; take 1 capsule by mouth three times daily   Rx By: Joaquin Hopper; Dispense: 30 Days ; #:90 Capsule; Refill: 5; For: Occipital neuralgia; BOUBACAR = N; Call Rx; Last Updated By: Kang Mireles; 10/2/2017 2:36:30 PM  Renal insufficiency, mild    · (1) MICROALBUMIN CREATININE RATIO, RANDOM URINE; Status:Active; Requested  ZCL:03EGH5016;    Perform:[facility]; VSX:26YUE1381; Last Updated Macy Cheadle; 10/2/2017 2:59:28 PM;Ordered; For:Renal insufficiency, mild; Ordered By:Lenny Dickey;    Discussion/Summary  Discussion Summary:   COMPLETE RESOLUTION OF CVA SYMPTOMS AND RIGHT SIDED PARALYSIS  CONT ASA/PLAVIX  TO SEE NEUROLOGIST AT THE END OF THE MONTH  CHECK LABS CHECKED     Chief Complaint  Chief Complaint Free Text Note Form: Patient here for [facility] Discharge 09/20/2017 Stroke [facility]      History of Present Illness  [facility] Communication [facility]: [facility]  He was hospitalized at [address] [place] [address]  The dates of hospitalization: 09/18/2017 TO 9/20/2017, date of admission: 09/18/2017, date of discharge: 09/20/2017  Diagnosis: CVA  He was discharged to home  He scheduled a follow up appointment  Symptoms: rash: Anai Prows Counseling was provided to patient's family  Patients wife  Communication performed and completed by Unruly Lemus   HPI: PATIENT Complaining OF ITCHY ARMS SINCE BEING ON PLAVIX  started morning monday with rigth sdied arm and leg weakness  went to ER via ambulance and diagnosed with cva, given tpa and symptoms resolved   Cerebrovascular Accident (Brief): The patient is being seen for follow-up of a hospitalization for cerebrovascular accident  The patient is currently asymptomatic  Mechanism:  cerebral infarction  Current treatment includes aspirin and clopidogrel (Plavix)        Review of Systems  Complete-Male:   Constitutional: No fever or chills, feels well, no tiredness, no recent weight gain or weight loss  Eyes: No complaints of eye pain, no red eyes, no discharge from eyes, no itchy eyes  ENT: no complaints of earache, no hearing loss, no nosebleeds, no nasal discharge, no sore throat, no hoarseness  Cardiovascular: No complaints of slow heart rate, no fast heart rate, no chest pain, no palpitations, no leg claudication, no lower extremity  Respiratory: No complaints of shortness of breath, no wheezing, no cough, no SOB on exertion, no orthopnea or PND  Gastrointestinal: No complaints of abdominal pain, no constipation, no nausea or vomiting, no diarrhea or bloody stools  Genitourinary: No complaints of dysuria, no incontinence, no hesitancy, no nocturia, no genital lesion, no testicular pain  Musculoskeletal: No complaints of arthralgia, no myalgias, no joint swelling or stiffness, no limb pain or swelling  Integumentary: a rash, but as noted in HPI  Neurological: No compliants of headache, no confusion, no convulsions, no numbness or tingling, no dizziness or fainting, no limb weakness, no difficulty walking  Psychiatric: Is not suicidal, no sleep disturbances, no anxiety or depression, no change in personality, no emotional problems  Endocrine: No complaints of proptosis, no hot flashes, no muscle weakness, no erectile dysfunction, no deepening of the voice, no feelings of weakness  Hematologic/Lymphatic: No complaints of swollen glands, no swollen glands in the neck, does not bleed easily, no easy bruising  Active Problems    1  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)   2  Aftercare following surgery (V58 89) (Z48 89)   3  Cerebral arterial aneurysm (437 3) (I67 1)   4  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,357 2) (E11 40)   5  Diabetes mellitus with peripheral circulatory disorder (250 70,443 81) (E11 51)   6  Encounter for screening for cardiovascular disorders (V81 2) (Z13 6)   7   Erectile dysfunction of non-organic origin (302 72) (F52 21)   8  Headache, chronic daily (784 0) (R51)   9  History of CVA (cerebrovascular accident) without residual deficits (V12 54) (Z86 73)   10  Hospital discharge follow-up (V67 59) (Z09)   11  Insomnia (780 52) (G47 00)   12  Liver replaced by transplant (V42 7) (Z94 4)   13  Migraine headache (346 90) (G43 909)   14  Need for prophylactic vaccination against Streptococcus pneumoniae (pneumococcus)    (V03 82) (Z23)   15  Need for prophylactic vaccination and inoculation against influenza (V04 81) (Z23)   16  Need for revaccination (V05 9) (Z23)   17  Occipital neuralgia (723 8) (M54 81)   18  Other cirrhosis of liver (571 5) (K74 69)   19  Peripheral neuropathy (356 9) (G62 9)   20  Postoperative examination (V67 00) (Z09)   21  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)   22  Pruritus (698 9) (L29 9)   23  Radiographic dye allergy status (V15 08) (Z91 041)   24  Renal insufficiency, mild (593 9) (N28 9)   25  Skin lesion of back (709 9) (L98 9)   26  Skin rash (782 1) (R21)   27  Special screening examination for neoplasm of prostate (V76 44) (Z12 5)   28  Spondylosis of cervical region without myelopathy or radiculopathy (721 0) (M47 812)   29  Thrombocytopenia (287 5) (D69 6)   30  Urinary hesitancy (788 64) (R39 11)   31  Vertigo (780 4) (R42)   32  Visit For: Exam Following Treatment At Hospital (V67 59)   33  Vitamin D deficiency (268 9) (E55 9)   34  Well adult on routine health check (V70 0) (Z00 00)    Past Medical History    1  History of Alcoholism (V11 3)   2  History of Change in mental state (780 97) (R41 82)   3  History of Diabetes Mellitus (250 00)   4  History of Drug dependence (304 90) (F19 20)   5  History of Hepatitis, C Virus (070 70)   6  History of Hepatitis, C Virus (070 70)   7  History of acute pharyngitis (V12 69) (Z87 09)   8  History of fatigue (V13 89) (Z87 898)   9  History of headache (V13 89) (Z87 898)   10  History of hyperglycemia (V12 29) (Z86 39)   11   History of kidney disease (V13 09) (Z87 448)   12  History of neoplasm of uncertain behavior of skin (V13 3) (Z86 03)   13  History of Impairment of balance (781 2) (R26 89)   14  History of 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469 0) (Z86 28)   15  History of Subdural Hygroma (432 1)   16  History of Visit for pre-operative examination (V72 84) (S56 179)    Surgical History    1  History of Brain Surgery   2  History of Cataract Surgery   3  History of Cholecystectomy   4  History of Liver Transplant   5  History of Rotator Cuff Repair   6  History of Shoulder Surgery    Family History  Mother    1  Family history of Benign Essential Hypertension  Father    2  Family history of Lung Cancer (V16 1)  Sister    3  Family history of Diabetes Mellitus (V18 0)  Brother    4  Family history of Cancer  Unknown    5  Family history of Cerebrovascular accident (CVA) due to embolism of cerebral artery   6  Family history of cerebrovascular accident (CVA) (V17 1) (Z82 3)  Family History    7  Family history of Family Health Status 6  Children Living   8  Family history of Family Health Status Of 2nd Brother - Alive   5  Family history of Family Health Status Of 2nd Sister - Alive   8  Family history of Family Health Status Of 3rd Brother - Alive   6  Family history of Family Health Status Of 3rd Sister - Alive   15  Family history of Family Health Status Of 4th Sister - Alive   15  Family history of Family Health Status Of 5th Sister - Alive   15  Family history of Family Health Status Of Brother - Alive   15  Family history of Family Health Status Of Father -    12  Family history of Family Health Status Of Mother -    16  Family history of Family Health Status Of Sister - Alive   25  Family history of Maternal Grandfather Is    23  Family history of Maternal Grandmother Is    21  Family history of Paternal Grandfather Is    24   Family history of Paternal Grandmother Is     Social History    · Denied: History of Alcohol Use (History)   · Caffeine use (V49 89) (F15 90)   · Education History   · Former smoker (V15 82) (Z87 891)   · Living With Significant Other   · Marital History - Single   · Physical Disability:   · Remotely quit drug use   · Denied: History of Sexually Active    Current Meds   1  Suresh Contour Monitor w/Device Kit; USE AS DIRECTED; Therapy: 20HJK1570 to (Last Rx:12May2017)  Requested for: 96LAH1159 Ordered   2  Suresh Contour Test In [employer]; TEST 3 TIMES DAILY; Therapy: 23AQP4099 to (Evaluate:01Jun2018)  Requested for: 90CYB1536; Last   Rx:06Jun2017 Ordered   3  Insulin Syringe 29G X 1/2" 1 ML Miscellaneous; as directed; Therapy: 38PWX9526 to (Evaluate:12Apr2020)  Requested for: 94TCL4124; Last   Rx:14Oct2016 Ordered   4  Lancets Miscellaneous; Check blood sugar 3 times daily; Therapy: 33QNP8742 to (Last Rx:12May2017)  Requested for: 52CEB3220 Ordered   5  Lantus 100 UNIT/ML Subcutaneous Solution; INJECT 24 UNITS SUBCUTANEOUS   EVERY NIGHT AT BEDTIME; Therapy: 43UFD3568 to (Evaluate:17Jun2017)  Requested for: 05SBJ1947; Last   Rx:14Oct2016 Ordered   6  Lyrica 50 MG Oral Capsule; take 1 capsule by mouth three times daily; Therapy: 59Mws4129 to (Evaluate:01Oct2017)  Requested for: 34Hfq8665; Last   Rx:04Apr2017 Ordered   7  Nystatin 473917 UNIT/GM External Cream; APPLY 2-3 TIMES DAILY TO AFFECTED   AREA(S); Therapy: 63Sbb1368 to (Last Rx:64Jmp4356)  Requested for: 92Zot5207 Ordered   8  Nystatin-Triamcinolone 005004-0 1 UNIT/GM-% External Cream; Apply sparingly to   affected area (s) twice daily; Therapy: 46JIL7669 to (Last Rx:75Rmv2297)  Requested for: 07Rrs8372 Ordered   9  Tacrolimus 1 MG Oral Capsule; TAKE 1 CAPSULE BY MOUTH TWICE DAILY; Therapy: 68DWD7995 to (Evaluate:02Apr2018)  Requested for: 80Row6220; Last   Rx:04Sep2017 Ordered   10  Temazepam 30 MG Oral Capsule; TAKE 1 CAPSULE AT BEDTIME AS NEEDED FOR    SLEEP; Last Rx:01Aug2017 Ordered   11   TraMADol HCl - 50 MG Oral Tablet; TAKE 2 TABLETS BY MOUTH THREE TIMES DAILY; Therapy: 00Uxo8874 to (Evaluate:07Oct2017)  Requested for: 92Ghs4050; Last    Rx:33Nup7244 Ordered   12  Triamcinolone Acetonide 0 1 % External Cream; APPLY  AND RUB  IN A THIN FILM TO    AFFECTED AREAS TWICE DAILY  (AM AND PM); Therapy: 83Bzm5380 to (Last Rx:07Sep2017)  Requested for: 07Sep2017 Ordered   13  Triamcinolone Acetonide 0 1 % External Cream; APPLY SPARINGLY AND RUB IN WELL    TO AFFECTED AREA(S) TWICE DAILY  NOT FOR FACE, BREAST OR GROIN;    Therapy: 68Dad2358 to (Last Rx:29Nov2016)  Requested for: 29Nov2016 Ordered   14  Zolpidem Tartrate 10 MG Oral Tablet; take 1 tablet daily at bedtime; Last Rx:48Bis3716    Ordered   15  ZyrTEC Allergy 10 MG Oral Tablet; take 1 tablet daily as needed; Therapy: 50Hwq6390 to (Evaluate:10Jan2018)  Requested for: 40DEN6027; Last    Rx:27Vyi8457 Ordered    Allergies    1  Cipro TABS   2  Iodinated Contrast Media   3  Tequin TABS    4  IVP Dye    Vitals  Signs   Recorded: 51MQM0599 02:37PM   Heart Rate: 74  Respiration: 18  Systolic: 726  Diastolic: 70  Height: 5 ft 3 2 in  Weight: 131 lb 4 oz  BMI Calculated: 23 1  BSA Calculated: 1 62    Physical Exam    Constitutional   General appearance: No acute distress, well appearing and well nourished  Pulmonary   Respiratory effort: No increased work of breathing or signs of respiratory distress  Auscultation of lungs: Clear to auscultation, equal breath sounds bilaterally, no wheezes, no rales, no rhonci  Cardiovascular   Auscultation of heart: Normal rate and rhythm, normal S1 and S2, without murmurs  Examination of extremities for edema and/or varicosities: Normal     Abdomen   Abdomen: Non-tender, no masses  Liver and spleen: No hepatomegaly or splenomegaly  Lymphatic   Palpation of lymph nodes in neck: No lymphadenopathy  Musculoskeletal   Gait and station: Normal     Neurologic   Cranial nerves: Cranial nerves 2-12 intact      Reflexes: 2+ and symmetric  Sensation: No sensory loss  Psychiatric   Orientation to person, place and time: Normal          Health Management  Health Maintenance   COLONOSCOPY; every 10 years; Last 47HQM4682; Next Due: 03Fma5270; Active    Future Appointments    Date/Time Provider Specialty Site   10/27/2017 11:00 AM Nikolai Parsons MD Neurology [facility] 176     Signatures   Electronically signed by :  Girma Masters, ; Sep 22 2017 12:30PM EST                       (Author)    Electronically signed by : Weston Vaca DO; Oct  2 2017  3:22PM EST                       (Author)

## 2018-01-12 NOTE — RESULT NOTES
Verified Results  (1) HEMOGLOBIN A1C 11CZS8965 03:04PM [facility]   TW Order Number: [id number]     Test Name Result Flag Reference   HEMOGLOBIN A1C 7 9 % H 4 2-6 3   EST  AVG  GLUCOSE 180 mg/dl       (1) COMPREHENSIVE METABOLIC PANEL 29OFJ0172 49:48EE Babatunde Booty TW Order Number: [id number]     Test Name Result Flag Reference   GLUCOSE,RANDM 163 mg/dL H    If the patient is fasting, the ADA then defines impaired fasting glucose as > 100 mg/dL and diabetes as > or equal to 123 mg/dL  SODIUM 137 mmol/L  136-145   POTASSIUM 5 1 mmol/L  3 5-5 3   CHLORIDE 104 mmol/L  100-108   CARBON DIOXIDE 28 mmol/L  21-32   ANION GAP (CALC) 5 mmol/L  4-13   BLOOD UREA NITROGEN 35 mg/dL H 5-25   CREATININE 2 27 mg/dL H 0 60-1 30   Standardized to IDMS reference method   CALCIUM 9 3 mg/dL  8 3-10 1   BILI, TOTAL 0 32 mg/dL  0 20-1 00   ALK PHOSPHATAS 95 U/L     ALT (SGPT) 19 U/L  12-78   AST(SGOT) 20 U/L  5-45   ALBUMIN 4 0 g/dL  3 5-5 0   TOTAL PROTEIN 8 4 g/dL H 6 4-8 2   eGFR Non-African American 28 9 ml/min/1 73sq m     [facility] Disease Education Program recommendations are as follows:  GFR calculation is accurate only with a steady state creatinine  Chronic Kidney disease less than 60 ml/min/1 73 sq  meters  Kidney failure less than 15 ml/min/1 73 sq  meters  (1) TSH WITH FT4 REFLEX 22CHJ8403 03:04PM Babatunde Booty TW Order Number: [id number]     Test Name Result Flag Reference   TSH 3 560 uIU/mL  0 358-3 740   Patients undergoing fluorescein dye angiography may retain small amounts of fluorescein in the body for 48-72 hours post procedure  Samples containing fluorescein can produce falsely depressed TSH values  If the patient had this procedure,a specimen should be resubmitted post fluorescein clearance  Discussion/Summary   DIABETES NUMBERS ARE SITLL HIGH  THIS WILL CAUSE KIDNEYS TO GET WORSE     [facility]

## 2018-01-13 VITALS
RESPIRATION RATE: 16 BRPM | DIASTOLIC BLOOD PRESSURE: 68 MMHG | HEART RATE: 80 BPM | SYSTOLIC BLOOD PRESSURE: 118 MMHG | OXYGEN SATURATION: 95 % | HEIGHT: 63 IN | BODY MASS INDEX: 23.21 KG/M2 | WEIGHT: 131 LBS

## 2018-01-13 VITALS
WEIGHT: 130.13 LBS | TEMPERATURE: 100.2 F | SYSTOLIC BLOOD PRESSURE: 100 MMHG | DIASTOLIC BLOOD PRESSURE: 70 MMHG | HEART RATE: 64 BPM | BODY MASS INDEX: 22.98 KG/M2 | RESPIRATION RATE: 16 BRPM | OXYGEN SATURATION: 93 %

## 2018-01-13 VITALS
RESPIRATION RATE: 16 BRPM | BODY MASS INDEX: 23.57 KG/M2 | HEIGHT: 63 IN | SYSTOLIC BLOOD PRESSURE: 124 MMHG | DIASTOLIC BLOOD PRESSURE: 80 MMHG | HEART RATE: 78 BPM | WEIGHT: 133 LBS

## 2018-01-13 NOTE — RESULT NOTES
Verified Results  (1) COMPREHENSIVE METABOLIC PANEL 22ZIY4999 62:79VN Ivone Mora   TW Order Number: [id number]  TW Order Number: [id number] Order Number: [id number] Order Number: [id number]     Test Name Result Flag Reference   GLUCOSE,RANDM 115 mg/dL     If the patient is fasting, the ADA then defines impaired fasting glucose as > 100 mg/dL and diabetes as > or equal to 123 mg/dL  SODIUM 139 mmol/L  136-145   POTASSIUM 4 8 mmol/L  3 5-5 3   CHLORIDE 104 mmol/L  100-108   CARBON DIOXIDE 28 mmol/L  21-32   ANION GAP (CALC) 7 mmol/L  4-13   BLOOD UREA NITROGEN 28 mg/dL H 5-25   CREATININE 1 82 mg/dL H 0 60-1 30   Standardized to IDMS reference method   CALCIUM 9 1 mg/dL  8 3-10 1   BILI, TOTAL 0 32 mg/dL  0 20-1 00   ALK PHOSPHATAS 90 U/L     ALT (SGPT) 18 U/L  12-78   AST(SGOT) 18 U/L  5-45   ALBUMIN 3 8 g/dL  3 5-5 0   TOTAL PROTEIN 7 9 g/dL  6 4-8 2   eGFR Non-African American 37 2 ml/min/1 73sq [facility] Disease Education Program recommendations are as follows:  GFR calculation is accurate only with a steady state creatinine  Chronic Kidney disease less than 60 ml/min/1 73 sq  meters  Kidney failure less than 15 ml/min/1 73 sq  meters  (1) HEMOGLOBIN A1C 20Jun2016 11:04AM Milvia Persaud Order Number: [id number]  TW Order Number: [id number]     Test Name Result Flag Reference   HEMOGLOBIN A1C 7 9 % H 4 2-6 3   EST  AVG   GLUCOSE 180 mg/dl       (1) LIPID PANEL FASTING W DIRECT LDL REFLEX 20Jun2016 11:04AM Milvia Persaud Order Number: [id number]  TW Order Number: [id number] Order Number: [id number] Order Number: [id number]     Test Name Result Flag Reference   CHOLESTEROL 180 mg/dL     LDL CHOLESTEROL CALCULATED 103 mg/dL H 0-100   Triglyceride:         Normal              <150 mg/dl       Borderline High    150-199 mg/dl       High               200-499 mg/dl       Very High          >499 mg/dl  Cholesterol:         Desirable        <200 mg/dl      Borderline High  200-239 mg/dl      High             >239 mg/dl  HDL Cholesterol:        High    >59 mg/dL      Low     <41 mg/dL  LDL Cholesterol:        Optimal          <100 mg/dl        Near Optimal     100-129 mg/dl        Above Optimal          Borderline High   130-159 mg/dl          High              160-189 mg/dl          Very High        >189 mg/dl  LDL CALCULATED:    This screening LDL is a calculated result  It does not have the accuracy of the Direct Measured LDL in the monitoring of patients with hyperlipidemia and/or statin therapy  Direct Measure LDL (CIB588) must be ordered separately in these patients  TRIGLYCERIDES 196 mg/dL H <=150   Specimen collection should occur prior to N-Acetylcysteine or Metamizole administration due to the potential for falsely depressed results  HDL,DIRECT 38 mg/dL L 40-60   Specimen collection should occur prior to Metamizole administration due to the potential for falsely depressed results       (1) MICROALBUMIN CREATININE RATIO, RANDOM URINE 20Jun2016 11:04AM [place] Order Number: [id number]   Order Number: [id number]     Test Name Result Flag Reference   MICROALBUMIN/ CREAT R 89 mg/g creatinine H 0-30   MICROALBUMIN,URINE 115 0 mg/L H 0 0-20 0   CREATININE URINE 129 0 mg/dL       (1) TSH WITH FT4 REFLEX 20Jun2016 11:04AM Terry [place]o    Order Number: [id number]  TW Order Number: [id number] Order Number: [id number] Order Number: [id number]     Test Name Result Flag Reference   TSH 2 250 uIU/mL  0 358-3 740     (1) PSA (SCREEN) (Dx V76 44 Screen for Prostate Cancer) 20Jun2016 11:04AM N[place]a [place] Order Number: [id number]  TW Order Number: [id number]     Test Name Result Flag Reference   PROSTATE SPECIFIC ANTIGEN <0 1 ng/mL  0 0-4 0       Discussion/Summary SUGARS ARE UP AND STARTING TO AFFECT KIDNEYS  WE WILL NEED TO DISCUSS AT NEXT VISIT     Rafael Coto

## 2018-01-13 NOTE — RESULT NOTES
Message   Recorded as Task   Date: 02/15/2016 08:19 AM, Created By: Bj Saleh   Task Name: Follow Up   Assigned To: [facility] [facility],Team   Regarding Patient: Conchita Edward, Status: Active   Comment:    Lanette Graham - 15 Feb 2016 8:19 AM     TASK CREATED  Pt  s/p LEFT C3-C5 MBB #2 on 2/10/16 w/ Dr Delacruz Fails  No POVS scheduled, No pain diary in chart  Please contact pt  on 2/17/16 Canda [facility] - 17 Feb 2752 8:78 PM     TASK EDITED  Spoke with Padmini Lance - She was speaking to patient in the back, she states patient is not sure where he put the pain diary -  but he did get pain relief for about an hour    Patient complains of stiff neck and would like to proceed with the RFA - Please advise  Luann Couch - 17 Feb 2016 3:52 PM     TASK REPLIED TO: Previously Assigned To Pb Ochoa to find diary, if relief was only 1 hour,its not very diagnostic  If unsure, we can repeat the test or discuss during ov  Canda Bing - 24 Feb 6831 0:77 PM     TASK EDITED  Spoke with Padmini Lance - She states patient did not find the pain diary, she said she was talking with patient and he stated that he got relief anout an hour after the procedure and it lasted a short while now he reports stiff neck and headaches at time  He does say it is not as bad as it was before  He is interested in moving forward with RFA - Please advise next step  Lanette Graham - 25 Feb 2016 9:07 AM     TASK EDITED  Received VM from a female calling on behalf of pt  on [place] procedure line from [place]  110 pm  Female states that the pt  is supposed to get procedure done  Female requesting c/b at [phone]     Luann Couch - 25 Feb 2016 11:53 AM     TASK REPLIED TO: Previously Assigned To Luann Couch  I recommend that we repeat the diagnostic test prior to proceed wtih the RFA as he did no have clear significant relief as expected during second MBB   Canda Suyapa - 26 Feb 2208 3:56 PM     TASK EDITED  Spoke with Darryl - Scheduled another confirmatory block as per below  Went over all preprocedure instructions          Signatures   Electronically signed by : Nena Pardo, ; Feb 26 2016  2:09PM EST                       (Author)

## 2018-01-14 VITALS
DIASTOLIC BLOOD PRESSURE: 80 MMHG | RESPIRATION RATE: 16 BRPM | BODY MASS INDEX: 23.74 KG/M2 | HEART RATE: 68 BPM | HEIGHT: 63 IN | SYSTOLIC BLOOD PRESSURE: 128 MMHG | WEIGHT: 134 LBS

## 2018-01-14 VITALS
WEIGHT: 131 LBS | DIASTOLIC BLOOD PRESSURE: 78 MMHG | HEART RATE: 78 BPM | HEIGHT: 63 IN | RESPIRATION RATE: 16 BRPM | BODY MASS INDEX: 23.21 KG/M2 | SYSTOLIC BLOOD PRESSURE: 120 MMHG

## 2018-01-14 VITALS
WEIGHT: 129.5 LBS | DIASTOLIC BLOOD PRESSURE: 74 MMHG | HEART RATE: 68 BPM | HEIGHT: 63 IN | SYSTOLIC BLOOD PRESSURE: 130 MMHG | BODY MASS INDEX: 22.95 KG/M2 | RESPIRATION RATE: 18 BRPM

## 2018-01-14 NOTE — MISCELLANEOUS
Message   Recorded as Task   Date: 02/05/2016 08:53 AM, Created By: Tomeka Torres   Task Name: Follow Up   Assigned To: [facility]   Regarding Patient: Sadie Salgado, Status: Active   Comment:    Taylor Maurer - 05 Feb 2016 8:53 AM     TASK CREATED  S/w patients friend- Naga Rolon who called for patient  -states they don't have the pain diary because she just has too many other appointments & they just didn't do it but wants to report that pt had very good relief until 2:00 and then pain went back to 8/10  -pain is very bad now & wants to know if he can come back for another injection or something for pain  -best # to call back [phone] & speak to Too Shine - 08 Feb 2016 10:59 AM     TASK REPLIED TO: Previously Assigned To [facility]  Received VM from [facility] from 1040 am  Female states that she is calling on behalf of the pt  Per female pt  had a procedure on his neck, and is having a lot of pain  Female states that pt  is wondering if he can proceed w/ second inj  and when  Female requesting c/b at [phone]  Michael James - 08 Feb 2016 11:09 AM     TASK REASSIGNED: Previously Assigned To [facility]  Please advise  Keren Ayala - 08 Feb 2016 11:44 AM     TASK REPLIED TO: Previously Assigned To Pb Ochoa  We can schedule for mbb #2, same levels if it covered all the area of pain for patient  Pleae remind them this is temporary relief  Michael James - 08 Feb 2016 1:12 PM     TASK REPLIED TO: Previously Assigned To [facility]  Spoke with Rosibelpt's friend,and advised of the same  States pt did receive good relief post MBB #1  Pt scheduled for LEFT C3-C5 MBB #2 2-10-16  Pre procedure instructions reviewed and aware that pain level should be 5 or> day of procedure  Verbalizes understanding  Active Problems    1  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)   2   Aftercare following surgery (V58 89) (Z48 89)   3  Cerebral arterial aneurysm (437 3) (I67 1)   4  Cervical myofascial pain syndrome (729 1) (M79 1)   5  Cervicalgia (723 1) (M54 2)   6  Diabetes mellitus with peripheral circulatory disorder (250 70,443 81) (E11 59)   7  Encounter for screening for cardiovascular disorders (V81 2) (Z13 6)   8  Erectile dysfunction of non-organic origin (302 72) (F52 21)   9  Fatigue (780 79) (R53 83)   10  Headache (784 0) (R51)   11  Headache, chronic daily (784 0) (R51)   12  Hepatitis, C Virus (070 70)   13  Hip pain, bilateral (719 45) (M25 551,M25 552)   14  History of CVA (cerebrovascular accident) without residual deficits (V12 54) (Z86 73)   15  Hospital discharge follow-up (V67 59) (Z09)   16  Impairment of balance (781 2) (R26 89)   17  Insomnia (780 52) (G47 00)   18  Liver replaced by transplant (V42 7) (Z94 4)   19  Memory loss (780 93) (R41 3)   20  Migraine headache (346 90) (G43 909)   21  Neck pain (723 1) (M54 2)   22  Need for prophylactic vaccination against Streptococcus pneumoniae (pneumococcus)    (V03 82) (Z23)   23  Need for prophylactic vaccination and inoculation against influenza (V04 81) (Z23)   24  Neoplasm of uncertain behavior of skin (238 2) (D48 5)   25  Occipital neuralgia (723 8) (M54 81)   26  Other cirrhosis of liver (571 5) (K74 69)   27  Peripheral neuropathy (356 9) (G62 9)   28  Postoperative examination (V67 00) (Z09)   29  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)   30  Radiographic dye allergy status (V15 08) (Z91 041)   31  Renal insufficiency, mild (593 9) (N28 9)   32  Special screening examination for neoplasm of prostate (V76 44) (Z12 5)   33  Spondylosis of cervical region without myelopathy or radiculopathy (721 0) (M47 812)   34  Thrombocytopenia (287 5) (D69 6)   35  Type 2 diabetes mellitus (250 00) (E11 9)   36  Vertigo (780 4) (R42)   37  Visit For: Exam Following Treatment At Hospital (V68 59)   38   Vitamin D deficiency (268 9) (E55 9)    Current Meds   1  Amitriptyline HCl - 10 MG Oral Tablet; TAKE 1 TABLET AT BEDTIME; Therapy: 52KNU4561 to (Boaz Mckee)  Requested for: 33 93 31; Last   Rx:42Fmm5846 Ordered   2  Aspirin 81 MG Oral Tablet; TAKE 1 TABLET DAILY; Therapy: 27VTK1121 to (Evaluate:09Odq6990) Recorded   3  Harvoni  MG Oral Tablet; TAKE 1 TABLET DAILY; Therapy: 92Lgp7915 to Recorded   4  HumuLIN N SUSP; INJECT DAILY PER SLIDING SCALE prn; Therapy: (Recorded:40Vkc0637) to Recorded   5  Insulin Syringe 29G X 1/2" 1 ML Miscellaneous; as directed; Therapy: 65IXT6023 to (Evaluate:27Jan2016)  Requested for: 56NDQ0336; Last   Rx:26Dso8269 Ordered   6  Lantus 100 UNIT/ML Subcutaneous Solution; INJECT 24 UNITS SUBCUTANEOUS AT   BEDTIME; Therapy: 02LZG9861 to (Evaluate:24Apr2016)  Requested for: 070-073-058; Last   Rx:27Oct2015 Ordered   7  Lyrica 50 MG Oral Capsule; take 1 capsule by mouth three times daily; Therapy: 08Apr2014 to (Elliot Nieves)  Requested for: 29TTZ6000; Last   Rx:21Oct2015; Status: ACTIVE - Renewal Voided Ordered   8  Tacrolimus 1 MG Oral Capsule (Prograf); Take 1 capsule twice daily; Therapy: 63FOB4693 to (Evaluate:68Xyi5886)  Requested for: 24RZW3254; Last   Rx:24Mtc7293 Ordered   9  Temazepam 30 MG Oral Capsule; TAKE 1 CAPSULE AT BEDTIME AS NEEDED FOR   SLEEP; Last Rx:01Oct2015; Status: ACTIVE - Renewal Voided Ordered   10  TraMADol HCl - 50 MG Oral Tablet; TAKE 2 TABLETS BY MOUTH THREE TIMES DAILY; Therapy: 95Uno2012 to (Elliot Nieves)  Requested for: 070-073-058; Last    Rx:27Oct2015 Ordered   11  Zolpidem Tartrate 10 MG Oral Tablet (Ambien); TAKE 1 TABLET DAILY AT BEDTIME;    Last Rx:01Oct2015; Status: ACTIVE - Renewal Voided Ordered    Allergies    1  Cipro TABS   2  Iodinated Contrast Media   3  Tequin TABS    4  IVP Dye    Signatures   Electronically signed by :  [facility] ; Feb 8 2016  1:13PM EST                       (Author)

## 2018-01-15 NOTE — CONSULTS
Assessment  Assessed    1  Cervicalgia (723 1) (M54 2)   2  Cervical myofascial pain syndrome (729 1) (M79 1)   3  Spondylosis of cervical region without myelopathy or radiculopathy (721 0) (M47 812)    Plan  Neck pain    · Procedure Flowsheet; Status:Complete;   Done: 16BNS9938 01:34PM   Performed: In Office; OTJ:62DVH9604;JJNPZTL;  For:Neck pain; Ordered By:Vijay Ochoa;    Discussion/Summary    Patient is a 56-year-old male with history of hepatitis C, liver cirrhosis that status post transplant, kidney disease, diabetes, aneurysm status post repair, CVA with no residual weakness presenting with left-sided neck and head pain  Patient's pain appears multifactorial  His MRI does demonstrate cervical spine stenosis and spondylosis without radiculopathy  He also describes occipital neuralgia type symptoms however at this time he is not experiencing this  Discussed cervical epidural sternoid injection versus cervical medial branch block x2 and RFA  Based on patient's presentation recommend proceeding with a medial branch block at this time  Complete risks and benefits including bleeding, infection, tissue reaction, nerve injury, and allergic reaction were discussed  The approach was demonstrated using models and literature was provided  Also consider physical therapy for myofascial pain  Discussed occipital nerve block and if required occipital nerve stimulator  f/u after injection  Possible side effects of new medications were reviewed with the patient/guardian today  The treatment plan was reviewed with the patient/guardian  The patient/guardian understands and agrees with the treatment plan      Chief Complaint  Chief Complaints    1   Neck Pain    History of Present Illness  Patient is a 56-year-old male with history of hepatitis C, liver cirrhosis that status post transplant, kidney disease, diabetes, aneurysm status post repair, CVA with no residual weakness presenting with left-sided neck and head pain  Pain is fairly constant described as sharp, pressure-like  Pain is increased with prayer, lying down, standing, walking, exercise  Patient's current medication includes Tylenol 500 mg, sumatriptan, Lyrica, Imitrex, tramadol prescribed by PCP and as per appears that opiate agreement is in place  Previous medications include codeine, Demerol, fentanyl, Vicodin, Dilaudid, methadone, MS Contin, Percocet, OxyContin, Darvocet, , aspirin, naproxen, Toradol, Flexeril, Skelaxin and gabapentin  Aris Staley presents with complaints of constant episodes of bilateral posterior neck pain, described as sharp, radiating to the left trapezius (pressure like)      Review of Systems    Constitutional: recent weight loss, but no fever and no recent weight gain  Eyes: double vision and blurry vision  Cardiovascular: no chest pain, no palpitations and no lower extremity edema  Respiratory: shortness of breath, but no wheezing  Musculoskeletal: difficulty walking and muscle weakness, but no joint stiffness, no joint swelling, no limb swelling`, no pain in extremity and no decreased range of motion  Neurological: memory loss, but no dizziness, no difficulty swallowing, no loss of consciousness and no seizures  Gastrointestinal: no nausea, no vomiting, no constipation and no diarrhea  Genitourinary: difficulty initiating urine stream, but no genital pain and no frequent urination  Integumentary: a rash  Psychiatric: no depression  Endocrine: no excessive thirst, no adrenal disease, no hypothyroidism and no hyperthyroidism  Hematologic/Lymphatic: a tendency for easy bruising and a tendency for easy bleeding  ROS reviewed  Active Problems  Problems    1  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)   2  Aftercare following surgery (V58 89) (Z48 89)   3  Cerebral arterial aneurysm (437 3) (I67 1)   4  Cervical myofascial pain syndrome (729 1) (M79 1)   5  Cervicalgia (723 1) (M54 2)   6   Diabetes mellitus with peripheral circulatory disorder (250 70,443 81) (E11 59)   7  Encounter for screening for cardiovascular disorders (V81 2) (Z13 6)   8  Erectile dysfunction of non-organic origin (302 72) (F52 21)   9  Fatigue (780 79) (R53 83)   10  Headache (784 0) (R51)   11  Headache, chronic daily (784 0) (R51)   12  Hepatitis, C Virus (070 70)   13  Hip pain, bilateral (719 45) (M25 551,M25 552)   14  History of CVA (cerebrovascular accident) without residual deficits (V12 54) (Z86 73)   15  Hospital discharge follow-up (V67 59) (Z09)   16  Impairment of balance (781 2) (R26 89)   17  Insomnia (780 52) (G47 00)   18  Liver replaced by transplant (V42 7) (Z94 4)   19  Memory loss (780 93) (R41 3)   20  Migraine headache (346 90) (G43 909)   21  Neck pain (723 1) (M54 2)   22  Need for prophylactic vaccination against Streptococcus pneumoniae (pneumococcus)    (V03 82) (Z23)   23  Need for prophylactic vaccination and inoculation against influenza (V04 81) (Z23)   24  Neoplasm of uncertain behavior of skin (238 2) (D48 5)   25  Occipital neuralgia (723 8) (M54 81)   26  Other cirrhosis of liver (571 5) (K74 69)   27  Peripheral neuropathy (356 9) (G62 9)   28  Postoperative examination (V67 00) (Z09)   29  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)   30  Radiographic dye allergy status (V15 08) (Z91 041)   31  Renal insufficiency, mild (593 9) (N28 9)   32  Special screening examination for neoplasm of prostate (V76 44) (Z12 5)   33  Thrombocytopenia (287 5) (D69 6)   34  Type 2 diabetes mellitus (250 00) (E11 9)   35  Vertigo (780 4) (R42)   36  Visit For: Exam Following Treatment At Hospital (V67 59)   37  Vitamin D deficiency (268 9) (E55 9)    Past Medical History  Problems    1  History of Alcoholism (V11 3)   2  History of Change in mental state (780 97) (R41 82)   3  History of Diabetes Mellitus (250 00)   4  History of Drug dependence (304 90) (F19 20)   5  History of Hepatitis, C Virus (070 70)   6   History of acute pharyngitis (V12 69) (Z87 09)   7  History of hyperglycemia (V12 29) (Z86 39)   8  History of kidney disease (V13 09) (Z87 448)   9  History of 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 3) (O81 30)   10  History of Subdural Hygroma (432 1)   11  History of Visit for pre-operative examination (V72 84) (R21 157)    The active problems and past medical history were reviewed and updated today  Surgical History  Problems    1  History of Brain Surgery   2  History of Cataract Surgery   3  History of Cholecystectomy   4  History of Liver Transplant   5  History of Rotator Cuff Repair   6  History of Shoulder Surgery    The surgical history was reviewed and updated today  Family History  Mother    1  Family history of Benign Essential Hypertension  Father    2  Family history of Lung Cancer (V16 1)  Sister    3  Family history of Diabetes Mellitus (V18 0)  Brother    4  Family history of Cancer  Unknown    5  Family history of Cerebrovascular accident (CVA) due to embolism of cerebral artery   6  Family history of cerebrovascular accident (CVA) (V17 1) (Z82 3)  Family History    7  Family history of Family Health Status 6  Children Living   8  Family history of Family Health Status Of 2nd Brother - Alive   5  Family history of Family Health Status Of 2nd Sister - Alive   8  Family history of Family Health Status Of 3rd Brother - Alive   6  Family history of Family Health Status Of 3rd Sister - Alive   15  Family history of Family Health Status Of 4th Sister - Alive   15  Family history of Family Health Status Of 5th Sister - Alive   15  Family history of Family Health Status Of Brother - Alive   15  Family history of Family Health Status Of Father -    12  Family history of Family Health Status Of Mother -    16  Family history of Family Health Status Of Sister - Alive   25  Family history of Maternal Grandfather Is    23  Family history of Maternal Grandmother Is    21   Family history of Paternal Grandfather Is    24  Family history of Paternal Grandmother Is     The family history was reviewed and updated today  Social History  Problems    · Denied: History of Alcohol Use (History)   · Caffeine use (V49 89) (F15 90)   · Education History   · Former smoker (V15 82) (M02 415)   · Living With Significant Other   · Marital History - Single   · Physical Disability:   · Remotely quit drug use   · Denied: History of Sexually Active  The social history was reviewed and updated today  The social history was reviewed and is unchanged  Current Meds   1  Amitriptyline HCl - 10 MG Oral Tablet; TAKE 1 TABLET AT BEDTIME; Therapy: 55GYU1317 to (Marcela Echols)  Requested for: 33 93 31; Last   Rx:31Uiw0053 Ordered   2  Aspirin 81 MG Oral Tablet; TAKE 1 TABLET DAILY; Therapy: 62ZNT3936 to (Evaluate:43Pdy2606) Recorded   3  Harvoni  MG Oral Tablet; TAKE 1 TABLET DAILY; Therapy: 78Dgk9836 to Recorded   4  HumuLIN N SUSP; INJECT DAILY PER SLIDING SCALE prn; Therapy: (Recorded:51Imu3027) to Recorded   5  Insulin Syringe 29G X 1/2" 1 ML Miscellaneous; as directed; Therapy: 90BBS1873 to (Evaluate:2016)  Requested for: 92EEY5876; Last   Rx:63Wlz3197 Ordered   6  Lantus 100 UNIT/ML Subcutaneous Solution; INJECT 24 UNITS SUBCUTANEOUS AT   BEDTIME; Therapy: 22BYO6061 to (Evaluate:2016)  Requested for: 070-073058; Last   Rx:2015 Ordered   7  Lyrica 50 MG Oral Capsule; take 1 capsule by mouth three times daily; Therapy: 54Zmw3329 to (Leila Cr)  Requested for: 23VSD8589; Last   Rx:2015; Status: ACTIVE - Renewal Voided Ordered   8  Tacrolimus 1 MG Oral Capsule; Take 1 capsule twice daily; Therapy: 58CHT2457 to (Evaluate:34Mnu8036)  Requested for: 78TXC2313; Last   Rx:20Gca5612 Ordered   9  Temazepam 30 MG Oral Capsule; TAKE 1 CAPSULE AT BEDTIME AS NEEDED FOR   SLEEP; Last Rx:2015; Status: ACTIVE - Renewal Voided Ordered   10  TraMADol HCl - 50 MG Oral Tablet; TAKE 2 TABLETS BY MOUTH THREE TIMES DAILY; Therapy: 07Npn9765 to (Domingo Colvin)  Requested for: 084-179-593; Last    Rx:27Oct2015 Ordered   11  Zolpidem Tartrate 10 MG Oral Tablet; TAKE 1 TABLET DAILY AT BEDTIME; Last    Rx:01Oct2015; Status: ACTIVE - Renewal Voided Ordered    The medication list was reviewed and updated today  Allergies  Medication    1  Cipro TABS   2  Iodinated Contrast Media   3  Tequin TABS  Non-Medication    4  IVP Dye    Vitals  Vital Signs [Data Includes: Current Encounter]    Recorded: 79MQM7851 01:14PM   Temperature 98 9 F, Oral   Heart Rate 78   Systolic 706   Diastolic 84   Height 5 ft 3 1 in   Weight 135 lb 7 04 oz   BMI Calculated 23 92   BSA Calculated 1 64   Pain Scale 6     Physical Exam    Constitutional   General appearance: Well developed, well nourished, alert, in no distress, non-toxic and no overt pain behavior  Eyes   Sclera: anicteric   HEENT   Hearing grossly intact  Neck   Neck: Supple, symmetric, trachea midline, no masses  Pulmonary   Respiratory effort: Even and unlabored  Cardiovascular   Examination of extremities: No edema or pitting edema present  Skin   Skin and subcutaneous tissue: Normal without rashes or lesions, well hydrated  Psychiatric   Mood and affect: Mood and affect appropriate  Neurologic   Cranial nerves: Cranial nerves II-XII grossly intact  Musculoskeletal   Gait and station: Normal        Results/Data  Encounter Results   Procedure Flowsheet 46CKH4475 01:34PM Rocésar Bowels     Test Name Result Flag Reference   Neck Disability Index Score 40         Results     MRI:  MRI Cervical spine dated 1/14/2016   FINDINGS:     ALIGNMENT: Straightening of normal cervical lordosis  Minor degenerative anterolisthesis at C3-C4, one of several locations of canal stenosis  Anai Elizabeth MARROW SIGNAL: Normal marrow signal is identified within the visualized bony structures   No discrete marrow lesion  CERVICAL AND VISUALIZED THORACIC CORD: Multifocal cord signal abnormalities have progressed since prior study  Gliosis is more confluent at the C3-C4 level  Ill-defined high signal also noted at the C5-C6 level  PREVERTEBRAL AND PARASPINAL SOFT TISSUES: Prevertebral and paraspinal soft tissues are unremarkable  VISUALIZED POSTERIOR FOSSA: The visualized posterior fossa demonstrates no abnormal signal      CERVICAL DISC SPACES:     C2-C3: Normal      C3-C4: Decreased disc height, broad-based disc osteophyte complex narrowing the sac to approximately 7 mm  Moderately severe right greater than left foraminal stenosis  C4-C5: Decreased disc height, marginal osteophytes  Mild uncinate and facet arthrosis  AP dimension of the sac approaches 8 mm  C5-C6: Marginal osteophytes and right greater than left uncinate and facet arthrosis, severe foraminal stenosis, correlate for right C6 radiculitis  Sac reduced to 7 mm     C6-C7: Circumferential bulge, bilateral facet arthrosis     C7-T1: Normal      UPPER THORACIC DISC SPACES: Normal        Multilevel degenerative changes with progressive CORD gliosis since prior study 9 years earlier  Stenosis most pronounced (7 mm) C3-4 and C5-6  Severe right C5-6 foraminal stenosis  Correlate for right C6 radiculitis  Future Appointments    Date/Time Provider Specialty Site   02/09/2016 01:00 PM KEON Hua   Neurosurgery [facility] NEUROSURGICAL   04/27/2016 01:30 PM Mary Christina DO [facility] [address] [place] [address]   05/19/2016 02:45 PM Laura Cornell [facility] Neurology [facility] [address]     Signatures   Electronically signed by : Barrera Bailey MD; Feb 2 2016  2:06PM EST                       (Author)

## 2018-01-16 NOTE — RESULT NOTES
Verified Results  * XR RIBS LEFT W PA CHEST MIN 3 VIEWS 21Apr2017 02:36PM Jose Richmond Order Number: [id number]     Test Name Result Flag Reference   XR RIBS LEFT W PA CHEST MIN 3 VIEWS (Report)     LEFT RIBS AND CHEST     INDICATION: Pain for one week  COMPARISON: X-ray dated 3/30/2015     VIEWS: Frontal chest and 3 views left hemithorax     IMAGES: 6     FINDINGS:     The cardiomediastinal silhouette is unremarkable  Lungs are clear  No pleural effusions  There is no pneumothorax  No rib fractures are identified  IMPRESSION:     1  No active pulmonary disease  2  No evidence of rib fractures         Workstation performed: [id number]     Signed by:   Fam Nelson DO   4/24/17

## 2018-01-16 NOTE — PROGRESS NOTES
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)   2  Aftercare following surgery (V58 89) (Z48 89)   3  Cerebral arterial aneurysm (437 3) (I67 1)   4  Cervical myofascial pain syndrome (729 1) (M79 1)   5  Cervicalgia (723 1) (M54 2)   6  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,357 2) (E11 40)   7  Diabetes mellitus with peripheral circulatory disorder (250 70,443 81) (E11 51)   8  Encounter for screening for cardiovascular disorders (V81 2) (Z13 6)   9  Erectile dysfunction of non-organic origin (302 72) (F52 21)   10  Headache, chronic daily (784 0) (R51)   11  Hip pain, bilateral (719 45) (M25 551,M25 552)   12  History of CVA (cerebrovascular accident) without residual deficits (V12 54) (Z86 73)   13  Hospital discharge follow-up (V67 59) (Z09)   14  Insomnia (780 52) (G47 00)   15  Liver replaced by transplant (V42 7) (Z94 4)   16  Memory loss (780 93) (R41 3)   17  Migraine headache (346 90) (G43 909)   18  Neck pain (723 1) (M54 2)   19  Need for prophylactic vaccination against Streptococcus pneumoniae (pneumococcus)    (V03 82) (Z23)   20  Need for prophylactic vaccination and inoculation against influenza (V04 81) (Z23)   21  Need for revaccination (V05 9) (Z23)   22  Occipital neuralgia (723 8) (M54 81)   23  Other cirrhosis of liver (571 5) (K74 69)   24  Peripheral neuropathy (356 9) (G62 9)   25  Postoperative examination (V67 00) (Z09)   26  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)   27  Pruritus (698 9) (L29 9)   28  Radiographic dye allergy status (V15 08) (Z91 041)   29   Renal insufficiency, mild (593  9) (N28 9)   30  Skin lesion of back (709 9) (L98 9)   31  Skin rash (782 1) (R21)   32  Special screening examination for neoplasm of prostate (V76 44) (Z12 5)   33  Spondylosis of cervical region without myelopathy or radiculopathy (721 0) (M47 812)   34  Thrombocytopenia (287 5) (D69 6)   35  Urinary hesitancy (788 64) (R39 11)   36  Vertigo (780 4) (R42)   37  Visit For: Exam Following Treatment At Hospital (V67 59)   38  Vitamin D deficiency (268 9) (E55 9)   39  Well adult on routine health check (V70 0) (Z00 00)    Current Meds   1  Suresh Contour Test In [employer]; TEST 3 TIMES DAILY; Therapy: 15VXC0302 to (Evaluate:86Kll8911)  Requested for: 92HIG6073; Last   Rx:87Cfx7216 Ordered   2  Insulin Syringe 29G X 1/2" 1 ML Miscellaneous; as directed; Therapy: 79OQY7404 to (Evaluate:12Apr2017)  Requested for: 64QPG5435; Last   Rx:14Oct2016 Ordered   3  Lantus 100 UNIT/ML Subcutaneous Solution; INJECT 24 UNITS SUBCUTANEOUS   EVERY NIGHT AT BEDTIME; Therapy: 47FEL6680 to (Evaluate:17Jun2017)  Requested for: 58RBL9609; Last   Rx:14Oct2016 Ordered   4  Lyrica 50 MG Oral Capsule; take 1 capsule by mouth three times daily; Therapy: 08Apr2014 to (Evaluate:25Apr2017)  Requested for: 27Oct2016; Last   Rx:27Oct2016 Ordered   5  Nystatin 938152 UNIT/GM External Cream; APPLY 2-3 TIMES DAILY TO AFFECTED   AREA(S); Therapy: 34Aco9314 to (Last Rx:78Dpe3263)  Requested for: 44Pmi1511 Ordered   6  Nystatin-Triamcinolone 131347-0 1 UNIT/GM-% External Cream; Apply sparingly to   affected area (s) twice daily; Therapy: 81BQB5896 to (Last Rx:88Dye3849)  Requested for: 69Yip2234 Ordered   7  SUMAtriptan Succinate 50 MG Oral Tablet; TAKE 1 TABLETS AT ONSET OF HEADACHE,   CAN REPEAT IN 2 HOURS, NO MORE THEN 2 DOSES IN 24 HOURS NO MORE   THAN 3 DOSES IN 1 WEEK; Therapy: 16MVD9137 to (Evaluate:23Jun2017)  Requested for: 64HBI0796 Recorded   8  Tacrolimus 1 MG Oral Capsule; TAKE 1 CAPSULE BY MOUTH TWICE DAILY;    Therapy: 28QAG3930 to (Evaluate:26Apr2017)  Requested for: 16MTR4328; Last   Rx:90Bys8225 Ordered   9  Temazepam 30 MG Oral Capsule; TAKE 1 CAPSULE AT BEDTIME AS NEEDED FOR   SLEEP; Last Rx:14Oct2016 Ordered   10  TraMADol HCl - 50 MG Oral Tablet; TAKE 2 TABLETS BY MOUTH THREE TIMES DAILY; Therapy: 27Jbc1747 to (Evaluate:57Dft0489)  Requested for: 69NRF4584; Last    Rx:10Nov2016 Ordered   11  Triamcinolone Acetonide 0 1 % External Cream; APPLY  AND RUB  IN A THIN FILM TO    AFFECTED AREAS TWICE DAILY  (AM AND PM); Therapy: 51Jvd2459 to (Last Rx:28Oct2016)  Requested for: 28Oct2016 Ordered   12  Triamcinolone Acetonide 0 1 % External Cream; APPLY SPARINGLY AND RUB IN    WELL TO AFFECTED AREA(S) TWICE DAILY  NOT FOR FACE, BREAST OR GROIN;    Therapy: 60Lrd0399 to (Last Rx:29Nov2016)  Requested for: 29Nov2016 Ordered   13  Zolpidem Tartrate 10 MG Oral Tablet; take 1 tablet daily at bedtime; Last Rx:14Oct2016    Ordered   14  ZyrTEC Allergy 10 MG Oral Tablet; take 1 tablet daily as needed; Therapy: 67Qpt5647 to (Evaluate:26Apr2017)  Requested for: 70AVU9084; Last    Rx:28Oct2016 Ordered    Allergies    1  Cipro TABS   2  Iodinated Contrast Media   3  Tequin TABS    4  IVP Dye    Future Appointments    Date/Time Provider Specialty Site   01/03/2017 01:00 PM KEON Steel   Nephrology [address]   05/30/2017 02:30 PM Chantel Aj  Family Medicine [address] [place] [address]     Signatures   Electronically signed by : Vanessa Bruce MD; Dec 29 2016  1:15PM EST                       (Author)

## 2018-01-16 NOTE — RESULT NOTES
Verified Results  (1) COMPREHENSIVE METABOLIC PANEL [id number] 21:17[facility] [place] Order Number: [id number]     Test Name Result Flag Reference   GLUCOSE,RANDM 213 mg/dL H    If the patient is fasting, the ADA then defines impaired fasting glucose as > 100 mg/dL and diabetes as > or equal to 123 mg/dL  SODIUM 136 mmol/L  136-145   POTASSIUM 4 9 mmol/L  3 5-5 3   CHLORIDE 105 mmol/L  100-108   CARBON DIOXIDE 23 mmol/L  21-32   ANION GAP (CALC) 8 mmol/L  4-13   BLOOD UREA NITROGEN 29 mg/dL H 5-25   CREATININE 2 01 mg/dL H 0 60-1 30   Standardized to IDMS reference method   CALCIUM 9 1 mg/dL  8 3-10 1   BILI, TOTAL 0 39 mg/dL  0 20-1 00   ALK PHOSPHATAS 85 U/L     ALT (SGPT) 23 U/L  12-78   AST(SGOT) 26 U/L  5-45   ALBUMIN 3 5 g/dL  3 5-5 0   TOTAL PROTEIN 7 9 g/dL  6 4-8 2   eGFR Non-African American 33 2 ml/min/1 73sq m     [facility] Energy Disease Education Program recommendations are as follows:  GFR calculation is accurate only with a steady state creatinine  Chronic Kidney disease less than 60 ml/min/1 73 sq  meters  Kidney failure less than 15 ml/min/1 73 sq  meters

## 2018-01-17 NOTE — MISCELLANEOUS
Provider Comments  Provider Comments:   PATIENT WAS A NO SHOW FOR THE APPOINTMENT      Signatures   Electronically signed by :  KEON Stevens ; Dec 20 2016  5:07PM EST

## 2018-01-17 NOTE — RESULT NOTES
Message   Recorded as Task   Date: 04/29/2016 08:59 AM, Created By: Fareed Tavrea   Task Name: Call Back   Assigned To: [facility],Team   Regarding Patient: Jose G Mendez, Status: Active   Comment:    Elena Shelton - 29 Apr 2016 8:59 AM     TASK CREATED  Results Inquiry  Pt  is S/P a Left C3-C5 RFA c/ HD on 4/27/16  Povs scheduled on 5/26/16    Attempted to call pt  and unable to leave message  No CADENjúnior Fuentes - 02 May 3123 62:12 AM     TASK EDITED   Rachael Ramsey - 06 May 2016 3:11 PM     TASK EDITED  Amanuel Martin with pt who reports no s/s of infection, adverse effect or sunburn-like sensation  Pt states everything is going well, and that he has no pain  Reminded pt that it could take 4-6 weeks for the full effects of the procedure to be experienced as this is the time it atkes for lesion to form  Pt verbalizes understanding  Advised pt to call with any further question/concerns  Pb Ochoa - 02 May 2016 3:40 PM     TASK REPLIED TO: Previously Assigned To Johanny Moore  Thank you  Signatures   Electronically signed by :  Myra Jeffers, ; May  2 2016  3:53PM EST                       (Author)

## 2018-01-23 NOTE — RESULT NOTES
Discussion/Summary   DIABETES IS WORSE    NEED TO WATCH DIET   DR Mackenzie Mesa     Verified Results  (1) COMPREHENSIVE METABOLIC PANEL [id number] 84:15EB [place] Order Number: [id number]     Test Name Result Flag Reference   SODIUM 138 mmol/L  136-145   POTASSIUM 4 6 mmol/L  3 5-5 3   Slightly Hemolyzed; Results May be Affected   CHLORIDE 104 mmol/L  100-108   CARBON DIOXIDE 29 mmol/L  21-32   ANION GAP (CALC) 5 mmol/L  4-13   BLOOD UREA NITROGEN 20 mg/dL  5-25   CREATININE 1 71 mg/dL H 0 60-1 30   Standardized to IDMS reference method   CALCIUM 9 2 mg/dL  8 3-10 1   BILI, TOTAL 0 27 mg/dL  0 20-1 00   ALK PHOSPHATAS 95 U/L     ALT (SGPT) 18 U/L  12-78   Specimen collection should occur prior to Sulfasalazine and/or Sulfapyridine administration due to the potential for falsely depressed results  AST(SGOT) 21 U/L  5-45   Slightly Hemolyzed; Results May be Affected  Specimen collection should occur prior to Sulfasalazine administration due to the potential for falsely depressed results  ALBUMIN 3 7 g/dL  3 5-5 0   TOTAL PROTEIN 8 5 g/dL H 6 4-8 2   eGFR 40 ml/min/1 73sq m     National Kidney Disease Education Program recommendations are as follows:  GFR calculation is accurate only with a steady state creatinine  Chronic Kidney disease less than 60 ml/min/1 73 sq  meters  Kidney failure less than 15 ml/min/1 73 sq  meters  GLUCOSE FASTING 174 mg/dL H 65-99   Specimen collection should occur prior to Sulfasalazine administration due to the potential for falsely depressed results  Specimen collection should occur prior to Sulfapyridine administration due to the potential for falsely elevated results  (1) HEMOGLOBIN A1C [id number] 09:20AM Tayisabella Brady Order Number: [id number]     Test Name Result Flag Reference   HEMOGLOBIN A1C 8 0 % H 4 2-6 3   EST  AVG   GLUCOSE 183 mg/dl       (1) LIPID PANEL FASTING W DIRECT LDL REFLEX [id number] 09:20AM Elva VALENTIN Order Number: [id number]     Test Name Result Flag Reference   CHOLESTEROL 207 mg/dL H    LDL CHOLESTEROL CALCULATED 118 mg/dL H 0-100   Triglyceride:        Normal <150 mg/dl   Borderline High 150-199 mg/dl   High 200-499 mg/dl   Very High >499 mg/dl      Cholesterol:       Desirable <200 mg/dl    Borderline High 200-239 mg/dl    High >239 mg/dl      HDL Cholesterol:       High>59 mg/dL    Low <41 mg/dL      HDL Cholesterol:       High>59 mg/dL    Low <41 mg/dL      This screening LDL is a calculated result  It does not have the accuracy of the Direct Measured LDL in the monitoring of patients with hyperlipidemia and/or statin therapy  Direct Measure LDL (MDN134) must be ordered separately in these patients  TRIGLYCERIDES 209 mg/dL H <=150   Specimen collection should occur prior to N-Acetylcysteine or Metamizole administration due to the potential for falsely depressed results  HDL,DIRECT 47 mg/dL  40-60   Specimen collection should occur prior to Metamizole administration due to the potential for falsley depressed results  (1) TSH WITH FT4 REFLEX [id number] 09:20AM Carlene Summers Order Number: [id number]     Test Name Result Flag Reference   TSH 2 770 uIU/mL  0 358-3 740   Patients undergoing fluorescein dye angiography may retain small amounts of fluorescein in the body for 48-72 hours post procedure  Samples containing fluorescein can produce falsely depressed TSH values  If the patient had this procedure,a specimen should be resubmitted post fluorescein clearance       (1) VITAMIN D 25-HYDROXY [id number] 09:20AM Carlene Summers Order Number: [id number]     Test Name Result Flag Reference   VIT D 25-HYDROX 24 7 ng/mL L 30 0-100 0   This assay is a certified procedure of the CDC Vitamin D Standardization Certification Program (VDSCP)     Deficiency <20ng/ml   Insufficiency 20-30ng/ml   Sufficient  ng/ml     *Patients undergoing fluorescein dye angiography may retain small amounts of fluorescein in the body for 48-72 hours post procedure  Samples containing fluorescein can produce falsely elevated Vitamin D values  If the patient had this procedure, a specimen should be resubmitted post fluorescein clearance

## 2018-01-24 VITALS
HEIGHT: 63 IN | RESPIRATION RATE: 18 BRPM | BODY MASS INDEX: 23.39 KG/M2 | DIASTOLIC BLOOD PRESSURE: 90 MMHG | HEART RATE: 80 BPM | SYSTOLIC BLOOD PRESSURE: 124 MMHG | WEIGHT: 132 LBS

## 2018-01-24 NOTE — PROGRESS NOTES
Assessment    1  Well adult on routine health check (V70 0) (Z00 00)    Plan  Diabetes mellitus with peripheral circulatory disorder, Thrombocytopenia    · (1) COMPREHENSIVE METABOLIC PANEL; Status:Active; Requested for:29Nov2016;    · (1) HEMOGLOBIN A1C; Status:Active; Requested for:29Nov2016;    · (1) TSH WITH FT4 REFLEX; Status:Active; Requested for:29Nov2016;   Skin rash    · Triamcinolone Acetonide 0 1 % External Cream; APPLY SPARINGLY AND RUB  IN WELL TO AFFECTED AREA(S) TWICE DAILY  NOT FOR FACE, BREAST OR GROIN  Well adult on routine health check    · Follow-up visit in 1 year Evaluation and Treatment  Follow-up  Status: Hold For -  Scheduling  Requested for: 87YMU5628   · Eat a low fat and low cholesterol diet ; Status:Complete;   Done: 58NEQ4035 02:51PM   · There are many exercise options for seniors ; Status:Complete;   Done: 24PLB7137  02:51PM   · These are things you can do to prevent falls in and around the home ; Status:Complete;    Done: 19RMU0120 02:51PM   · Use a sun block product with an SPF of 15 or more ; Status:Complete;   Done:  13LZT2851 02:51PM   · Call [phone] if: You have any warning signs of skin cancer ; Status:Complete;    Done: 55BMJ2497 02:51PM   · Call [phone] if: You experience a new kind of chest pain (angina) or pressure ;  Status:Complete;   Done: 92ILZ4700 02:51PM    Discussion/Summary  Impression: Subsequent Annual Wellness Visit, with preventive exam as well as age and risk appropriate counseling completed  Cardiovascular screening and counseling: screening is current  Diabetes screening and counseling: screening is current  Colorectal cancer screening and counseling: screening is current  Prostate cancer screening and counseling: screening is current  Osteoporosis screening and counseling: screening not indicated  Advance Directive Planning: he was encouraged to follow-up with me to discuss his questions and/or decisions   Advice and education were given regarding increasing physical activity  He was referred to GI  Patient Discussion: plan discussed with the patient, follow-up visit needed in one year  Chief Complaint  PT is being seen for Medicare wellness visit      History of Present Illness  HPI: HERE FOR Sandra Rosen to [employer] and Wellness Visits: The patient is being seen for the subsequent annual wellness visit  Medicare Screening and Risk Factors   Hospitalizations: no previous hospitalizations  Once per lifetime medicare screening tests: ECG  Medicare Screening Tests Risk Questions   Abdominal aortic aneurysm risk assessment: none indicated  Osteoporosis risk assessment: none indicated  HIV risk assessment: none indicated  Drug and Alcohol Use: The patient is a former cigarette smoker  The patient reports never drinking alcohol  He has never used illicit drugs  Diet and Physical Activity: Current diet includes well balanced meals  He is sedentary  Mood Disorder and Cognitive Impairment Screening: He denies feeling down, depressed, or hopeless over the past two weeks  He denies feeling little interest or pleasure in doing things over the past two weeks  Cognitive impairment screening: denies difficulty learning/retaining new information, denies difficulty handling complex tasks, denies difficulty with reasoning, denies difficulty with spatial ability and orientation, denies difficulty with language and denies difficulty with behavior  Functional Ability/Level of Safety: Hearing is normal bilaterally  The patient is currently able to do activities of daily living without limitations  Activities of daily living details: does not need help using the phone, no transportation help needed, does not need help shopping, no meal preparation help needed, does not need help doing housework, does not need help doing laundry, does not need help managing medications and does not need help managing money   Fall risk factors:  polypharmacy and antihypertensive use, but no alcohol use, no mobility impairment, no antidepressant use, no deconditioning, no postural hypotension, no sedative use, no visual impairment, no urinary incontinence, no cognitive impairment, up and go test was normal and no previous fall  Home safety risk factors:  no unfamiliar surroundings, no loose rugs, no poor household lighting, no uneven floors, no household clutter, grab bars in the bathroom and handrails on the stairs  Co-Managers and Medical Equipment/Suppliers: See Patient Care Team      Patient Care Team    Care Team Member Role Specialty Office Number   Baltazar Bond [facility]  Neurosurgery [phone]   Catherine Bhatt, [address] [phone]   Roberta Alarcon MD  Pain Management [phone]   Samira CUELLO  Specialist Plastic Surgery [phone]     Review of Systems    Constitutional: negative  Head and Face: negative  Eyes: negative  ENT: negative  Cardiovascular: negative  Respiratory: negative  Gastrointestinal: negative  Genitourinary: negative  Musculoskeletal: negative  Integumentary and Breasts: SKIN LESION FROM FALLING, but as noted in HPI  Neurological: negative  Psychiatric: negative  Endocrine: negative  Hematologic and Lymphatic: negative  Active Problems    1  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)   2  Aftercare following surgery (V58 89) (Z48 89)   3  Cerebral arterial aneurysm (437 3) (I67 1)   4  Cervical myofascial pain syndrome (729 1) (M79 1)   5  Cervicalgia (723 1) (M54 2)   6  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,357 2) (E11 40)   7  Diabetes mellitus with peripheral circulatory disorder (250 70,443 81) (E11 51)   8  Encounter for screening for cardiovascular disorders (V81 2) (Z13 6)   9  Erectile dysfunction of non-organic origin (302 72) (F52 21)   10  Headache, chronic daily (784 0) (R51)   11  Hip pain, bilateral (719 45) (M25 551,M25 552)   12   History of CVA (cerebrovascular accident) without residual deficits (V12 54) (Z86 73)   13  Hospital discharge follow-up (V67 59) (Z09)   14  Insomnia (780 52) (G47 00)   15  Liver replaced by transplant (V42 7) (Z94 4)   16  Memory loss (780 93) (R41 3)   17  Migraine headache (346 90) (G43 909)   18  Neck pain (723 1) (M54 2)   19  Need for prophylactic vaccination against Streptococcus pneumoniae (pneumococcus)    (V03 82) (Z23)   20  Need for prophylactic vaccination and inoculation against influenza (V04 81) (Z23)   21  Occipital neuralgia (723 8) (M54 81)   22  Other cirrhosis of liver (571 5) (K74 69)   23  Peripheral neuropathy (356 9) (G62 9)   24  Postoperative examination (V67 00) (Z09)   25  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)   26  Pruritus (698 9) (L29 9)   27  Radiographic dye allergy status (V15 08) (Z91 041)   28  Renal insufficiency, mild (593 9) (N28 9)   29  Skin lesion of back (709 9) (L98 9)   30  Skin rash (782 1) (R21)   31  Special screening examination for neoplasm of prostate (V76 44) (Z12 5)   32  Spondylosis of cervical region without myelopathy or radiculopathy (721 0) (M47 812)   33  Thrombocytopenia (287 5) (D69 6)   34  Urinary hesitancy (788 64) (R39 11)   35  Vertigo (780 4) (R42)   36  Visit For: Exam Following Treatment At Hospital (V67 59)   37   Vitamin D deficiency (268 9) (E55 9)    Past Medical History    · History of Alcoholism (V11 3)   · History of Change in mental state (780 97) (R41 82)   · History of Diabetes Mellitus (250 00)   · History of Drug dependence (304 90) (F19 20)   · History of Hepatitis, C Virus (070 70)   · History of Hepatitis, C Virus (070 70)   · History of acute pharyngitis (V12 69) (Z87 09)   · History of fatigue (V13 89) (H27 392)   · History of headache (V13 89) (F37 883)   · History of hyperglycemia (V12 29) (Z86 39)   · History of kidney disease (V13 09) (Z87 448)   · History of neoplasm of uncertain behavior of skin (V13 3) (Z87 2)   · History of Impairment of balance (781 2) (R26 89)   · 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) (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 53)   · History of Subdural Hygroma (432 1)   · History of Visit for pre-operative examination (V72 84) (O09 977)    The active problems and past medical history were reviewed and updated today  Surgical History    · History of Brain Surgery   · History of Cataract Surgery   · History of Cholecystectomy   · History of Liver Transplant   · History of Rotator Cuff Repair   · History of Shoulder Surgery    The surgical history was reviewed and updated today  Family History  Mother    · Family history of Benign Essential Hypertension  Father    · Family history of Lung Cancer (V16 1)  Sister    · Family history of Diabetes Mellitus (V18 0)  Brother    · Family history of Cancer  Unknown    · Family history of Cerebrovascular accident (CVA) due to embolism of cerebral artery   · Family history of cerebrovascular accident (CVA) (V17 1) (Z82 3)  Family History    · Family history of Family Health Status 6  Children Living   · Family history of Family Health Status Of 2nd Brother - Alive   · Family history of Family Health Status Of 2nd Sister - Alive   · Family history of Family Health Status Of 2rd Brother - Alive   · Family history of Family Health Status Of 2rd Sister - Alive   · Family history of Family Health Status Of 4th Sister - Alive   · Family history of Family Health Status Of 5th Sister - Alive   · Family history of Family Health Status Of Brother - Alive   · Family history of Family Health Status Of Father -    · Family history of Family Health Status Of Mother -    · Family history of Family Health Status Of Sister - Alive   · Family history of Maternal Grandfather Is    · Family history of Maternal Grandmother Is    · Family history of Paternal Grandfather Is    · Family history of Paternal Grandmother Is     The family history was reviewed and updated today  Social History    · Denied: History of Alcohol Use (History)   · Caffeine use (V49 89) (F15 90)   · Education History   · less than highschool   · Former smoker (V15 82) (C56 591)   · Living With Significant Other   · girlfriend & daughter   · Marital History - Single   · Physical Disability:   · Remotely quit drug use   · Denied: History of Sexually Active  The social history was reviewed and updated today  Current Meds   1  Suresh Contour Test In [employer]; TEST 3 TIMES DAILY; Therapy: 14LWQ2248 to (Evaluate:83Qsa9257)  Requested for: 34GAB9225; Last   Rx:13Lbh0991 Ordered   2  Insulin Syringe 29G X 1/2" 1 ML Miscellaneous; as directed; Therapy: 93LLM5572 to (Evaluate:12Apr2017)  Requested for: 51PEU0014; Last   Rx:14Oct2016 Ordered   3  Lantus 100 UNIT/ML Subcutaneous Solution; INJECT 24 UNITS SUBCUTANEOUS   EVERY NIGHT AT BEDTIME; Therapy: 93KTW2325 to (Evaluate:17Jun2017)  Requested for: 47ICA5850; Last   Rx:14Oct2016 Ordered   4  Lyrica 50 MG Oral Capsule; take 1 capsule by mouth three times daily; Therapy: 27Lhc3847 to (Evaluate:25Apr2017)  Requested for: 17Pyr0243; Last   Rx:27Oct2016 Ordered   5  Nystatin 223860 UNIT/GM External Cream; APPLY 2-3 TIMES DAILY TO AFFECTED   AREA(S); Therapy: 99Wly9714 to (Last Rx:23Btv5203)  Requested for: 64Woa7452 Ordered   6  Nystatin-Triamcinolone 856667-1 1 UNIT/GM-% External Cream; Apply sparingly to   affected area (s) twice daily; Therapy: 39WFH4738 to (Last Rx:26Wxy6593)  Requested for: 91Pwe4029 Ordered   7  SUMAtriptan Succinate 50 MG Oral Tablet; TAKE 1 TABLETS AT ONSET OF HEADACHE,   CAN REPEAT IN 2 HOURS, NO MORE THEN 2 DOSES IN 24 HOURS NO MORE   THAN 3 DOSES IN 1 WEEK; Therapy: 82CPR1461 to (Evaluate:23Jun2017)  Requested for: 06XZF5038 Recorded   8  Tacrolimus 1 MG Oral Capsule; TAKE 1 CAPSULE BY MOUTH TWICE DAILY; Therapy: 47PNF7570 to (Evaluate:26Apr2017)  Requested for: 34FCY2624; Last   Rx:48Tcd3752 Ordered   9   Temazepam 30 MG Oral Capsule; TAKE 1 CAPSULE AT BEDTIME AS NEEDED FOR   SLEEP; Last Rx:14Oct2016 Ordered   10  TraMADol HCl - 50 MG Oral Tablet; TAKE 2 TABLETS BY MOUTH THREE TIMES DAILY; Therapy: 95Lnn3307 to (Evaluate:22Jdj8840)  Requested for: 24IDA9344; Last    Rx:10Nov2016 Ordered   11  Triamcinolone Acetonide 0 1 % External Cream; APPLY  AND RUB  IN A THIN FILM TO    AFFECTED AREAS TWICE DAILY  (AM AND PM); Therapy: 12Hxt1618 to (Last Rx:28Oct2016)  Requested for: 28Oct2016 Ordered   12  Triamcinolone Acetonide 0 1 % External Cream; APPLY SPARINGLY AND RUB IN    WELL TO AFFECTED AREA(S) TWICE DAILY  NOT FOR FACE, BREAST OR GROIN;    Therapy: 67Epe1821 to (Last Rx:50Ipl8201)  Requested for: 33Ygv8781 Ordered   13  Zolpidem Tartrate 10 MG Oral Tablet; take 1 tablet daily at bedtime; Last Rx:34Oeu1419    Ordered   14  ZyrTEC Allergy 10 MG Oral Tablet; take 1 tablet daily as needed; Therapy: 64Kvj0332 to (60-26-81-34)  Requested for: 54JKJ8780; Last    Rx:28Oct2016 Ordered    The medication list was reviewed and updated today  Allergies    1  Cipro TABS   2  Iodinated Contrast Media   3  Tequin TABS    4  IVP Dye    Immunizations   1 2 3    Influenza  12-Nov-2012 11-Sep-2013 27-Oct-2015    PCV  27-Oct-2015      PPSV  03-Sep-2013       Vitals  Signs    Heart Rate: 80  Respiration: 16  Systolic: 792  Diastolic: 70  Height: 5 ft 3 23 in  Weight: 129 lb 6 oz  BMI Calculated: 22 75  BSA Calculated: 1 61    Physical Exam    Constitutional   General appearance: No acute distress, well appearing and well nourished  Head and Face   Head and face: Normal     Eyes   Conjunctiva and lids: No erythema, swelling or discharge  Pupils and irises: Equal, round, reactive to light  Ears, Nose, Mouth, and Throat   External inspection of ears and nose: Normal     Otoscopic examination: Tympanic membranes translucent with normal light reflex  Canals patent without erythema      Hearing: Normal     Nasal mucosa, septum, and turbinates: Normal without edema or erythema  Lips, teeth, and gums: Normal, good dentition  Oropharynx: Normal with no erythema, edema, exudate or lesions  Neck   Neck: Supple, symmetric, trachea midline, no masses  Thyroid: Normal, no thyromegaly  Pulmonary   Respiratory effort: No increased work of breathing or signs of respiratory distress  Auscultation of lungs: Clear to auscultation  Cardiovascular   Auscultation of heart: Normal rate and rhythm, normal S1 and S2, no murmurs  Examination of extremities for edema and/or varicosities: Normal     Abdomen   Abdomen: Non-tender, no masses  Liver and spleen: No hepatomegaly or splenomegaly  Lymphatic   Palpation of lymph nodes in neck: No lymphadenopathy  Palpation of lymph nodes in axillae: No lymphadenopathy  Musculoskeletal   Gait and station: Normal     Inspection/palpation of digits and nails: Normal without clubbing or cyanosis  Inspection/palpation of joints, bones, and muscles: Normal     Range of motion: Normal     Stability: Normal     Muscle strength/tone: Normal     Skin   Skin and subcutaneous tissue: Normal without rashes or lesions  Palpation of skin and subcutaneous tissue: Normal turgor  Neurologic   Cranial nerves: Cranial nerves 2-12 intact  Cortical function: Normal mental status  Reflexes: 2+ and symmetric  Sensation: No sensory loss  Coordination: Normal finger to nose and heel to shin  Psychiatric   Judgment and insight: Normal     Orientation to person, place and time: Normal     Recent and remote memory: Intact  Mood and affect: Normal        Health Management  Health Maintenance   COLONOSCOPY; every 10 years; Last 19MNF6906; Next Due: 19Plm1929;  Active    Signatures   Electronically signed by : Sony Angel DO; Nov 29 2016  2:52PM EST                       (Author)

## 2018-01-26 ENCOUNTER — TELEPHONE (OUTPATIENT)
Dept: FAMILY MEDICINE CLINIC | Facility: CLINIC | Age: 68
End: 2018-01-26

## 2018-01-26 RX ORDER — TEMAZEPAM 30 MG/1
1 CAPSULE ORAL
COMMUNITY
End: 2018-10-19 | Stop reason: SDUPTHER

## 2018-01-26 RX ORDER — ZOLPIDEM TARTRATE 10 MG/1
1 TABLET ORAL
COMMUNITY
End: 2018-01-29 | Stop reason: SDUPTHER

## 2018-01-26 RX ORDER — TRAMADOL HYDROCHLORIDE 50 MG/1
2 TABLET ORAL 3 TIMES DAILY
COMMUNITY
Start: 2014-08-21 | End: 2018-01-29 | Stop reason: SDUPTHER

## 2018-01-29 ENCOUNTER — TELEPHONE (OUTPATIENT)
Dept: FAMILY MEDICINE CLINIC | Facility: CLINIC | Age: 68
End: 2018-01-29

## 2018-01-29 DIAGNOSIS — E13.40 NEUROPATHY DUE TO SECONDARY DIABETES MELLITUS (HCC): ICD-10-CM

## 2018-01-29 DIAGNOSIS — G47.00 INSOMNIA, UNSPECIFIED TYPE: ICD-10-CM

## 2018-01-29 DIAGNOSIS — G47.00 INSOMNIA, UNSPECIFIED TYPE: Primary | ICD-10-CM

## 2018-01-29 RX ORDER — TEMAZEPAM 30 MG/1
30 CAPSULE ORAL
Qty: 30 CAPSULE | Refills: 5 | OUTPATIENT
Start: 2018-01-29 | End: 2018-02-26 | Stop reason: SDUPTHER

## 2018-01-29 RX ORDER — TEMAZEPAM 30 MG/1
CAPSULE ORAL
Qty: 30 CAPSULE | Refills: 0 | OUTPATIENT
Start: 2018-01-29

## 2018-01-29 RX ORDER — ZOLPIDEM TARTRATE 10 MG/1
10 TABLET ORAL
Qty: 30 TABLET | Refills: 5 | OUTPATIENT
Start: 2018-01-29 | End: 2018-02-26 | Stop reason: SDUPTHER

## 2018-01-29 RX ORDER — TRAMADOL HYDROCHLORIDE 50 MG/1
100 TABLET ORAL 3 TIMES DAILY
Qty: 30 TABLET | Refills: 0 | OUTPATIENT
Start: 2018-01-29 | End: 2018-03-15 | Stop reason: SDUPTHER

## 2018-01-29 RX ORDER — ZOLPIDEM TARTRATE 10 MG/1
TABLET ORAL
Qty: 30 TABLET | Refills: 0 | OUTPATIENT
Start: 2018-01-29

## 2018-02-23 ENCOUNTER — TELEPHONE (OUTPATIENT)
Dept: FAMILY MEDICINE CLINIC | Facility: CLINIC | Age: 68
End: 2018-02-23

## 2018-02-26 DIAGNOSIS — G47.00 INSOMNIA, UNSPECIFIED TYPE: ICD-10-CM

## 2018-02-26 RX ORDER — TEMAZEPAM 30 MG/1
30 CAPSULE ORAL
Qty: 30 CAPSULE | Refills: 3 | OUTPATIENT
Start: 2018-02-26 | End: 2018-10-19

## 2018-02-26 RX ORDER — ZOLPIDEM TARTRATE 10 MG/1
10 TABLET ORAL
Qty: 30 TABLET | Refills: 3 | OUTPATIENT
Start: 2018-02-26 | End: 2018-06-25 | Stop reason: SDUPTHER

## 2018-02-27 RX ORDER — ZOLPIDEM TARTRATE 10 MG/1
TABLET ORAL
Qty: 30 TABLET | Refills: 0 | OUTPATIENT
Start: 2018-02-27

## 2018-02-27 RX ORDER — TEMAZEPAM 30 MG/1
CAPSULE ORAL
Qty: 30 CAPSULE | Refills: 0 | OUTPATIENT
Start: 2018-02-27

## 2018-03-07 NOTE — PROGRESS NOTES
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)   2  Aftercare following surgery (V58 89) (Z48 89)   3  Cerebral arterial aneurysm (437 3) (I67 1)   4  Cervical myofascial pain syndrome (729 1) (M79 1)   5  Cervicalgia (723 1) (M54 2)   6  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,357 2) (E11 40)   7  Diabetes mellitus with peripheral circulatory disorder (250 70,443 81) (E11 51)   8  Encounter for screening for cardiovascular disorders (V81 2) (Z13 6)   9  Erectile dysfunction of non-organic origin (302 72) (F52 21)   10  Headache, chronic daily (784 0) (R51)   11  Hip pain, bilateral (719 45) (M25 551,M25 552)   12  History of CVA (cerebrovascular accident) without residual deficits (V12 54) (Z86 73)   13  Hospital discharge follow-up (V67 59) (Z09)   14  Insomnia (780 52) (G47 00)   15  Liver replaced by transplant (V42 7) (Z94 4)   16  Memory loss (780 93) (R41 3)   17  Migraine headache (346 90) (G43 909)   18  Neck pain (723 1) (M54 2)   19  Need for prophylactic vaccination against Streptococcus pneumoniae (pneumococcus)    (V03 82) (Z23)   20  Need for prophylactic vaccination and inoculation against influenza (V04 81) (Z23)   21  Need for revaccination (V05 9) (Z23)   22  Occipital neuralgia (723 8) (M54 81)   23  Other cirrhosis of liver (571 5) (K74 69)   24  Peripheral neuropathy (356 9) (G62 9)   25  Postoperative examination (V67 00) (Z09)   26  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)   27  Pruritus (698 9) (L29 9)   28  Radiographic dye allergy status (V15 08) (Z91 041)   29  Renal insufficiency, mild (593 9) (N2 9   30  Skin lesion of back (709 9) (L98 9)   31  Skin rash (782 1) (R21)   32  Special screening examination for neoplasm of prostate (V76 44) (Z12 5)   33  Spondylosis of cervical region without myelopathy or radiculopathy (721 0) (M47 812)   34  Thrombocytopenia (287 5) (D69 6)   35  Urinary hesitancy (788 64) (R39 11)   36  Vertigo (780 4) (R42)   37  Visit For: Exam Following Treatment At Hospital (V67 59)   38  Vitamin D deficiency (268 9) (E55 9)   39  Well adult on routine health check (V70 0) (Z00 00)    Immunizations  Influenza --- Pama Fries: 32-Knq-1736Ktvyepdo Lam: 11-Sep-2013; Series3: 27-Oct-2015   PCV --- Series1: 27-Oct-2015   PPSV --- Series1: 03-Sep-2013     Current Meds   1  Suresh Contour Test In [employer]; TEST 3 TIMES DAILY   2  Insulin Syringe 29G X 1/2" 1 ML Miscellaneous; as directed   3  Lantus 100 UNIT/ML Subcutaneous Solution; INJECT 24 UNITS SUBCUTANEOUS   EVERY NIGHT AT BEDTIME   4  Lyrica 50 MG Oral Capsule; take 1 capsule by mouth three times daily   5  Nystatin 902228 UNIT/GM External Cream; APPLY 2-3 TIMES DAILY TO AFFECTED   AREA(S)   6  Nystatin-Triamcinolone 133660-0 1 UNIT/GM-% External Cream; Apply sparingly to   affected area (s) twice daily   7  SUMAtriptan Succinate 50 MG Oral Tablet; TAKE 1 TABLETS AT ONSET OF HEADACHE,   CAN REPEAT IN 2 HOURS, NO MORE THEN 2 DOSES IN 24 HOURS NO MORE   THAN 3 DOSES IN 1 WEEK   8  Tacrolimus 1 MG Oral Capsule; TAKE 1 CAPSULE BY MOUTH TWICE DAILY   9  Temazepam 30 MG Oral Capsule; TAKE 1 CAPSULE AT BEDTIME AS NEEDED FOR   SLEEP   10  TraMADol HCl - 50 MG Oral Tablet; TAKE 2 TABLETS BY MOUTH THREE TIMES DAILY   11  Triamcinolone Acetonide 0 1 % External Cream; APPLY  AND RUB  IN A THIN FILM TO    AFFECTED AREAS TWICE DAILY  (AM AND PM)   12  Triamcinolone Acetonide 0 1 % External Cream; APPLY SPARINGLY AND RUB IN WELL    TO AFFECTED AREA(S) TWICE DAILY   NOT FOR FACE, BREAST OR GROIN   13  Zolpidem Tartrate 10 MG Oral Tablet; take 1 tablet daily at bedtime   14  ZyrTEC Allergy 10 MG Oral Tablet; take 1 tablet daily as needed    Allergies    1  Cipro TABS   2  Iodinated Contrast Media   3  Tequin TABS    4  IVP Dye    Plan    1  RVAC-Prevnar 13 Intramuscular Suspension    Education  Education Items with no Session   RVAC-Prevnar 13 Intramuscular Suspension;  Provided: 76YPJ3447 12:53PM;  Counselor: Charleen Greer; Future Appointments    Date/Time Provider Specialty Site   01/03/2017 01:00 PM KEON Zamarripa   Nephrology [place] [address]   05/30/2017 02:30 PM Mary Christina DO Family Medicine [address] [facility]     Signatures   Electronically signed by : Amanda Ibarra MD; Dec 29 2016  1:17PM EST                       (Author)

## 2018-03-15 DIAGNOSIS — E13.40 NEUROPATHY DUE TO SECONDARY DIABETES MELLITUS (HCC): ICD-10-CM

## 2018-03-15 RX ORDER — TRAMADOL HYDROCHLORIDE 50 MG/1
100 TABLET ORAL 3 TIMES DAILY
Qty: 30 TABLET | Refills: 0 | OUTPATIENT
Start: 2018-03-15 | End: 2018-03-15 | Stop reason: SDUPTHER

## 2018-03-16 RX ORDER — TRAMADOL HYDROCHLORIDE 50 MG/1
TABLET ORAL
Qty: 180 TABLET | Refills: 0 | OUTPATIENT
Start: 2018-03-16 | End: 2018-04-25 | Stop reason: SDUPTHER

## 2018-04-02 DIAGNOSIS — N28.9 DISORDER OF KIDNEY AND URETER: ICD-10-CM

## 2018-04-13 ENCOUNTER — APPOINTMENT (OUTPATIENT)
Dept: LAB | Facility: HOSPITAL | Age: 68
End: 2018-04-13
Payer: MEDICARE

## 2018-04-13 ENCOUNTER — TELEPHONE (OUTPATIENT)
Dept: FAMILY MEDICINE CLINIC | Facility: CLINIC | Age: 68
End: 2018-04-13

## 2018-04-13 ENCOUNTER — TRANSCRIBE ORDERS (OUTPATIENT)
Dept: LAB | Facility: HOSPITAL | Age: 68
End: 2018-04-13

## 2018-04-13 DIAGNOSIS — Z94.4 STATUS POST LIVER TRANSPLANT (HCC): ICD-10-CM

## 2018-04-13 DIAGNOSIS — Z94.4 LIVER TRANSPLANTED (HCC): Primary | ICD-10-CM

## 2018-04-13 DIAGNOSIS — Z94.4 STATUS POST LIVER TRANSPLANT (HCC): Primary | ICD-10-CM

## 2018-04-13 LAB
ALBUMIN SERPL BCP-MCNC: 3.8 G/DL (ref 3.5–5)
ALP SERPL-CCNC: 98 U/L (ref 46–116)
ALT SERPL W P-5'-P-CCNC: 13 U/L (ref 12–78)
AST SERPL W P-5'-P-CCNC: 20 U/L (ref 5–45)
BILIRUB DIRECT SERPL-MCNC: 0.07 MG/DL (ref 0–0.2)
BILIRUB SERPL-MCNC: 0.32 MG/DL (ref 0.2–1)
BUN SERPL-MCNC: 23 MG/DL (ref 5–25)
CALCIUM SERPL-MCNC: 8.9 MG/DL
CHLORIDE SERPL-SCNC: 106 MMOL/L (ref 100–108)
CO2 SERPL-SCNC: 26 MMOL/L (ref 21–32)
CREAT SERPL-MCNC: 1.77 MG/DL (ref 0.6–1.3)
ERYTHROCYTE [DISTWIDTH] IN BLOOD BY AUTOMATED COUNT: 14.6 % (ref 11.6–15.1)
GFR SERPL CREATININE-BSD FRML MDRD: 38 ML/MIN/1.73SQ M
GGT SERPL-CCNC: 19 U/L (ref 5–85)
GLUCOSE SERPL-MCNC: 125 MG/DL (ref 65–140)
HCT VFR BLD AUTO: 41.1 % (ref 36.5–49.3)
HGB BLD-MCNC: 12.6 G/DL (ref 12–17)
INR PPP: 1.03 (ref 0.86–1.16)
LDH SERPL-CCNC: 186 U/L (ref 81–234)
MAGNESIUM SERPL-MCNC: 1.8 MG/DL (ref 1.6–2.6)
MCH RBC QN AUTO: 24.5 PG (ref 26.8–34.3)
MCHC RBC AUTO-ENTMCNC: 30.7 G/DL (ref 31.4–37.4)
MCV RBC AUTO: 80 FL (ref 82–98)
PHOSPHATE SERPL-MCNC: 3.2 MG/DL (ref 2.3–4.1)
PLATELET # BLD AUTO: 162 THOUSANDS/UL (ref 149–390)
PMV BLD AUTO: 12.9 FL (ref 8.9–12.7)
POTASSIUM SERPL-SCNC: 4.5 MMOL/L (ref 3.5–5.3)
PROT SERPL-MCNC: 8.5 G/DL (ref 6.4–8.2)
PROTHROMBIN TIME: 13.5 SECONDS (ref 12.1–14.4)
RBC # BLD AUTO: 5.15 MILLION/UL (ref 3.88–5.62)
SODIUM SERPL-SCNC: 137 MMOL/L (ref 136–145)
TACROLIMUS BLD-MCNC: 3.5 NG/ML (ref 2–20)
WBC # BLD AUTO: 7.04 THOUSAND/UL (ref 4.31–10.16)

## 2018-04-13 PROCEDURE — 83615 LACTATE (LD) (LDH) ENZYME: CPT

## 2018-04-13 PROCEDURE — 80048 BASIC METABOLIC PNL TOTAL CA: CPT

## 2018-04-13 PROCEDURE — 83735 ASSAY OF MAGNESIUM: CPT

## 2018-04-13 PROCEDURE — 84100 ASSAY OF PHOSPHORUS: CPT

## 2018-04-13 PROCEDURE — 80197 ASSAY OF TACROLIMUS: CPT

## 2018-04-13 PROCEDURE — 85027 COMPLETE CBC AUTOMATED: CPT

## 2018-04-13 PROCEDURE — 36415 COLL VENOUS BLD VENIPUNCTURE: CPT

## 2018-04-13 PROCEDURE — 82977 ASSAY OF GGT: CPT

## 2018-04-13 PROCEDURE — 85610 PROTHROMBIN TIME: CPT

## 2018-04-13 PROCEDURE — 80076 HEPATIC FUNCTION PANEL: CPT

## 2018-04-13 RX ORDER — TACROLIMUS 1 MG/1
1 CAPSULE ORAL EVERY 12 HOURS SCHEDULED
Qty: 180 CAPSULE | Refills: 0 | Status: SHIPPED | OUTPATIENT
Start: 2018-04-13 | End: 2018-07-19 | Stop reason: SDUPTHER

## 2018-04-16 ENCOUNTER — TELEPHONE (OUTPATIENT)
Dept: FAMILY MEDICINE CLINIC | Facility: CLINIC | Age: 68
End: 2018-04-16

## 2018-04-16 DIAGNOSIS — B37.2 CANDIDIASIS OF SKIN: Primary | ICD-10-CM

## 2018-04-16 RX ORDER — NYSTATIN 100000 U/G
CREAM TOPICAL 3 TIMES DAILY
COMMUNITY
Start: 2016-02-22 | End: 2018-04-16 | Stop reason: SDUPTHER

## 2018-04-16 RX ORDER — NYSTATIN 100000 U/G
CREAM TOPICAL AS NEEDED
Qty: 30 G | Refills: 2 | Status: SHIPPED | OUTPATIENT
Start: 2018-04-16 | End: 2018-08-31 | Stop reason: SDUPTHER

## 2018-04-25 DIAGNOSIS — E13.40 NEUROPATHY DUE TO SECONDARY DIABETES MELLITUS (HCC): ICD-10-CM

## 2018-04-25 RX ORDER — TRAMADOL HYDROCHLORIDE 50 MG/1
TABLET ORAL
Qty: 180 TABLET | Refills: 0 | Status: SHIPPED | OUTPATIENT
Start: 2018-04-25 | End: 2018-06-07 | Stop reason: SDUPTHER

## 2018-05-14 DIAGNOSIS — G62.9 NEUROPATHY: Primary | ICD-10-CM

## 2018-05-29 ENCOUNTER — APPOINTMENT (OUTPATIENT)
Dept: LAB | Facility: HOSPITAL | Age: 68
End: 2018-05-29
Payer: MEDICARE

## 2018-05-29 ENCOUNTER — TRANSCRIBE ORDERS (OUTPATIENT)
Dept: LAB | Facility: HOSPITAL | Age: 68
End: 2018-05-29

## 2018-05-29 DIAGNOSIS — R79.1 ABNORMAL COAGULATION PROFILE: ICD-10-CM

## 2018-05-29 DIAGNOSIS — Z94.4 LIVER REPLACED BY TRANSPLANT (HCC): ICD-10-CM

## 2018-05-29 DIAGNOSIS — Z94.4 LIVER REPLACED BY TRANSPLANT (HCC): Primary | ICD-10-CM

## 2018-05-29 DIAGNOSIS — D68.9 BLOOD CLOTTING DISORDER (HCC): ICD-10-CM

## 2018-05-29 DIAGNOSIS — C22.1 MALIGNANT NEOPLASM OF INTRAHEPATIC BILE DUCTS (HCC): ICD-10-CM

## 2018-05-29 LAB
ALBUMIN SERPL BCP-MCNC: 3.5 G/DL (ref 3.5–5)
ALP SERPL-CCNC: 97 U/L (ref 46–116)
ALT SERPL W P-5'-P-CCNC: 17 U/L (ref 12–78)
ANION GAP SERPL CALCULATED.3IONS-SCNC: 7 MMOL/L (ref 4–13)
AST SERPL W P-5'-P-CCNC: 23 U/L (ref 5–45)
BASOPHILS # BLD AUTO: 0.03 THOUSANDS/ΜL (ref 0–0.1)
BASOPHILS NFR BLD AUTO: 0 % (ref 0–1)
BILIRUB SERPL-MCNC: 0.35 MG/DL (ref 0.2–1)
BUN SERPL-MCNC: 21 MG/DL (ref 5–25)
CALCIUM SERPL-MCNC: 9.3 MG/DL (ref 8.3–10.1)
CHLORIDE SERPL-SCNC: 102 MMOL/L (ref 100–108)
CO2 SERPL-SCNC: 28 MMOL/L (ref 21–32)
CREAT SERPL-MCNC: 1.68 MG/DL (ref 0.6–1.3)
EOSINOPHIL # BLD AUTO: 0.6 THOUSAND/ΜL (ref 0–0.61)
EOSINOPHIL NFR BLD AUTO: 8 % (ref 0–6)
ERYTHROCYTE [DISTWIDTH] IN BLOOD BY AUTOMATED COUNT: 14.4 % (ref 11.6–15.1)
GFR SERPL CREATININE-BSD FRML MDRD: 41 ML/MIN/1.73SQ M
GGT SERPL-CCNC: 20 U/L (ref 5–85)
GLUCOSE P FAST SERPL-MCNC: 173 MG/DL (ref 65–99)
HCT VFR BLD AUTO: 44.3 % (ref 36.5–49.3)
HGB BLD-MCNC: 13.6 G/DL (ref 12–17)
INR PPP: 0.96 (ref 0.86–1.17)
LYMPHOCYTES # BLD AUTO: 2.85 THOUSANDS/ΜL (ref 0.6–4.47)
LYMPHOCYTES NFR BLD AUTO: 40 % (ref 14–44)
MAGNESIUM SERPL-MCNC: 1.8 MG/DL (ref 1.6–2.6)
MCH RBC QN AUTO: 24.4 PG (ref 26.8–34.3)
MCHC RBC AUTO-ENTMCNC: 30.7 G/DL (ref 31.4–37.4)
MCV RBC AUTO: 79 FL (ref 82–98)
MONOCYTES # BLD AUTO: 0.49 THOUSAND/ΜL (ref 0.17–1.22)
MONOCYTES NFR BLD AUTO: 7 % (ref 4–12)
NEUTROPHILS # BLD AUTO: 3.23 THOUSANDS/ΜL (ref 1.85–7.62)
NEUTS SEG NFR BLD AUTO: 45 % (ref 43–75)
NRBC BLD AUTO-RTO: 0 /100 WBCS
POTASSIUM SERPL-SCNC: 5 MMOL/L (ref 3.5–5.3)
PROT SERPL-MCNC: 8.2 G/DL (ref 6.4–8.2)
PROTHROMBIN TIME: 12.9 SECONDS (ref 11.8–14.2)
RBC # BLD AUTO: 5.58 MILLION/UL (ref 3.88–5.62)
SODIUM SERPL-SCNC: 137 MMOL/L (ref 136–145)
TACROLIMUS BLD-MCNC: 4.3 NG/ML (ref 2–20)
WBC # BLD AUTO: 7.21 THOUSAND/UL (ref 4.31–10.16)

## 2018-05-29 PROCEDURE — 82977 ASSAY OF GGT: CPT

## 2018-05-29 PROCEDURE — 80053 COMPREHEN METABOLIC PANEL: CPT

## 2018-05-29 PROCEDURE — 85025 COMPLETE CBC W/AUTO DIFF WBC: CPT

## 2018-05-29 PROCEDURE — 85610 PROTHROMBIN TIME: CPT

## 2018-05-29 PROCEDURE — 80197 ASSAY OF TACROLIMUS: CPT

## 2018-05-29 PROCEDURE — 83735 ASSAY OF MAGNESIUM: CPT

## 2018-05-29 PROCEDURE — 36415 COLL VENOUS BLD VENIPUNCTURE: CPT

## 2018-05-30 ENCOUNTER — TELEPHONE (OUTPATIENT)
Dept: FAMILY MEDICINE CLINIC | Facility: CLINIC | Age: 68
End: 2018-05-30

## 2018-06-06 PROBLEM — E11.40 CONTROLLED TYPE 2 DIABETES WITH NEUROPATHY (HCC): Status: ACTIVE | Noted: 2017-09-18

## 2018-06-06 RX ORDER — TRIAMCINOLONE ACETONIDE 1 MG/G
CREAM TOPICAL 2 TIMES DAILY
COMMUNITY
Start: 2016-02-22 | End: 2018-12-18 | Stop reason: HOSPADM

## 2018-06-06 RX ORDER — LANCETS 30 GAUGE
EACH MISCELLANEOUS
COMMUNITY
Start: 2017-05-12

## 2018-06-06 RX ORDER — ROSUVASTATIN CALCIUM 10 MG/1
1 TABLET, COATED ORAL
COMMUNITY
Start: 2017-12-05 | End: 2018-09-24 | Stop reason: SDUPTHER

## 2018-06-06 RX ORDER — IBUPROFEN 200 MG
TABLET ORAL
COMMUNITY
Start: 2012-01-11

## 2018-06-07 ENCOUNTER — OFFICE VISIT (OUTPATIENT)
Dept: FAMILY MEDICINE CLINIC | Facility: CLINIC | Age: 68
End: 2018-06-07
Payer: MEDICARE

## 2018-06-07 VITALS
HEART RATE: 72 BPM | BODY MASS INDEX: 22.53 KG/M2 | DIASTOLIC BLOOD PRESSURE: 80 MMHG | RESPIRATION RATE: 16 BRPM | WEIGHT: 132 LBS | HEIGHT: 64 IN | SYSTOLIC BLOOD PRESSURE: 138 MMHG

## 2018-06-07 DIAGNOSIS — G47.00 INSOMNIA, UNSPECIFIED TYPE: ICD-10-CM

## 2018-06-07 DIAGNOSIS — E13.40 NEUROPATHY DUE TO SECONDARY DIABETES MELLITUS (HCC): ICD-10-CM

## 2018-06-07 DIAGNOSIS — L29.9 PRURITUS: ICD-10-CM

## 2018-06-07 DIAGNOSIS — Z12.11 SCREENING FOR COLON CANCER: ICD-10-CM

## 2018-06-07 DIAGNOSIS — I63.9 CEREBROVASCULAR ACCIDENT (CVA), UNSPECIFIED MECHANISM (HCC): ICD-10-CM

## 2018-06-07 DIAGNOSIS — N18.30 CKD (CHRONIC KIDNEY DISEASE) STAGE 3, GFR 30-59 ML/MIN (HCC): Chronic | ICD-10-CM

## 2018-06-07 DIAGNOSIS — E11.40 CONTROLLED TYPE 2 DIABETES WITH NEUROPATHY (HCC): ICD-10-CM

## 2018-06-07 DIAGNOSIS — Z00.00 MEDICARE ANNUAL WELLNESS VISIT, SUBSEQUENT: Primary | ICD-10-CM

## 2018-06-07 DIAGNOSIS — E55.9 VITAMIN D DEFICIENCY: ICD-10-CM

## 2018-06-07 PROCEDURE — 99214 OFFICE O/P EST MOD 30 MIN: CPT | Performed by: FAMILY MEDICINE

## 2018-06-07 PROCEDURE — G0439 PPPS, SUBSEQ VISIT: HCPCS | Performed by: FAMILY MEDICINE

## 2018-06-07 RX ORDER — TRAMADOL HYDROCHLORIDE 50 MG/1
TABLET ORAL
Qty: 180 TABLET | Refills: 0 | Status: SHIPPED | OUTPATIENT
Start: 2018-06-07 | End: 2018-07-19 | Stop reason: SDUPTHER

## 2018-06-07 NOTE — PATIENT INSTRUCTIONS
Obesity   AMBULATORY CARE:   Obesity  is when your body mass index (BMI) is greater than 30  Your healthcare provider will use your height and weight to measure your BMI  The risks of obesity include  many health problems, such as injuries or physical disability  You may need tests to check for the following:  · Diabetes     · High blood pressure or high cholesterol     · Heart disease     · Gallbladder or liver disease     · Cancer of the colon, breast, prostate, liver, or kidney     · Sleep apnea     · Arthritis or gout  Seek care immediately if:   · You have a severe headache, confusion, or difficulty speaking  · You have weakness on one side of your body  · You have chest pain, sweating, or shortness of breath  Contact your healthcare provider if:   · You have symptoms of gallbladder or liver disease, such as pain in your upper abdomen  · You have knee or hip pain and discomfort while walking  · You have symptoms of diabetes, such as intense hunger and thirst, and frequent urination  · You have symptoms of sleep apnea, such as snoring or daytime sleepiness  · You have questions or concerns about your condition or care  Treatment for obesity  focuses on helping you lose weight to improve your health  Even a small decrease in BMI can reduce the risk for many health problems  Your healthcare provider will help you set a weight-loss goal   · Lifestyle changes  are the first step in treating obesity  These include making healthy food choices and getting regular physical activity  Your healthcare provider may suggest a weight-loss program that involves coaching, education, and therapy  · Medicine  may help you lose weight when it is used with a healthy diet and physical activity  · Surgery  can help you lose weight if you are very obese and have other health problems  There are several types of weight-loss surgery  Ask your healthcare provider for more information    Be successful losing weight:   · Set small, realistic goals  An example of a small goal is to walk for 20 minutes 5 days a week  Anther goal is to lose 5% of your body weight  · Tell friends, family members, and coworkers about your goals  and ask for their support  Ask a friend to lose weight with you, or join a weight-loss support group  · Identify foods or triggers that may cause you to overeat , and find ways to avoid them  Remove tempting high-calorie foods from your home and workplace  Place a bowl of fresh fruit on your kitchen counter  If stress causes you to eat, then find other ways to cope with stress  · Keep a diary to track what you eat and drink  Also write down how many minutes of physical activity you do each day  Weigh yourself once a week and record it in your diary  Eating changes: You will need to eat 500 to 1,000 fewer calories each day than you currently eat to lose 1 to 2 pounds a week  The following changes will help you cut calories:  · Eat smaller portions  Use small plates, no larger than 9 inches in diameter  Fill your plate half full of fruits and vegetables  Measure your food using measuring cups until you know what a serving size looks like  · Eat 3 meals and 1 or 2 snacks each day  Plan your meals in advance  [employer] and eat at home most of the time  Eat slowly  · Eat fruits and vegetables at every meal   They are low in calories and high in fiber, which makes you feel full  Do not add butter, margarine, or cream sauce to vegetables  Use herbs to season steamed vegetables  · Eat less fat and fewer fried foods  Eat more baked or grilled chicken and fish  These protein sources are lower in calories and fat than red meat  Limit fast food  Dress your salads with olive oil and vinegar instead of bottled dressing  · Limit the amount of sugar you eat  Do not drink sugary beverages  Limit alcohol  Activity changes:  Physical activity is good for your body in many ways   It helps you burn calories and build strong muscles  It decreases stress and depression, and improves your mood  It can also help you sleep better  Talk to your healthcare provider before you begin an exercise program   · Exercise for at least 30 minutes 5 days a week  Start slowly  Set aside time each day for physical activity that you enjoy and that is convenient for you  It is best to do both weight training and an activity that increases your heart rate, such as walking, bicycling, or swimming  · Find ways to be more active  Do yard work and housecleaning  Walk up the stairs instead of using elevators  Spend your leisure time going to events that require walking, such as outdoor festivals or fairs  This extra physical activity can help you lose weight and keep it off  Follow up with your healthcare provider as directed: You may need to meet with a dietitian  Write down your questions so you remember to ask them during your visits  © 2017 [address] [employer] Information is for End User's use only and may not be sold, redistributed or otherwise used for commercial purposes  All illustrations and images included in CareNotes® are the copyrighted property of [employer]  or Benson Lanza  The above information is an  only  It is not intended as medical advice for individual conditions or treatments  Talk to your doctor, nurse or pharmacist before following any medical regimen to see if it is safe and effective for you  Urinary Incontinence   WHAT YOU NEED TO KNOW:   What is urinary incontinence? Urinary incontinence (UI) is when you lose control of your bladder  What causes UI? UI occurs because your bladder cannot store or empty urine properly  The following are the most common types of UI:  · Stress incontinence  is when you leak urine due to increased bladder pressure  This may happen when you cough, sneeze, or exercise       · Urge incontinence  is when you feel the need to urinate right away and leak urine accidentally  · Mixed incontinence  is when you have both stress and urge UI  What are the signs and symptoms of UI?   · You feel like your bladder does not empty completely when you urinate  · You urinate often and need to urinate immediately  · You leak urine when you sleep, or you wake up with the urge to urinate  · You leak urine when you cough, sneeze, exercise, or laugh  How is UI diagnosed? Your healthcare provider will ask how often you leak urine and whether you have stress or urge symptoms  Tell him which medicines you take, how often you urinate, and how much liquid you drink each day  You may need any of the following tests:  · Urine tests  may show infection or kidney function  · A pelvic exam  may be done to check for blockages  A pelvic exam will also show if your bladder, uterus, or other organs have moved out of place  · An x-ray, ultrasound, or CT  may show problems with parts of your urinary system  You may be given contrast liquid to help your organs show up better in the pictures  Tell the healthcare provider if you have ever had an allergic reaction to contrast liquid  Do not enter the MRI room with anything metal  Metal can cause serious injury  Tell the healthcare provider if you have any metal in or on your body  · A bladder scan  will show how much urine is left in your bladder after you urinate  You will be asked to urinate and then healthcare providers will use a small ultrasound machine to check the urine left in your bladder  · Cystometry  is used to check the function of your urinary system  Your healthcare provider checks the pressure in your bladder while filling it with fluid  Your bladder pressure may also be tested when your bladder is full and while you urinate  How is UI treated? · Medicines  can help strengthen your bladder control      · Electrical stimulation  is used to send a small amount of electrical energy to your pelvic floor muscles  This helps control your bladder function  Electrodes may be placed outside your body or in your rectum  For women, the electrodes may be placed in the vagina  · A bulking agent  may be injected into the wall of your urethra to make it thicker  This helps keep your urethra closed and decreases urine leakage  · Devices  such as a clamp, pessary, or tampon may help stop urine leaks  Ask your healthcare provider for more information about these and other devices  · Surgery  may be needed if other treatments do not work  Several types of surgery can help improve your bladder control  Ask your healthcare provider for more information about the surgery you may need  How can I manage my symptoms? · Do pelvic muscle exercises often  Your pelvic muscles help you stop urinating  Squeeze these muscles tight for 5 seconds, then relax for 5 seconds  Gradually work up to squeezing for 10 seconds  Do 3 sets of 15 repetitions a day, or as directed  This will help strengthen your pelvic muscles and improve bladder control  · A catheter  may be used to help empty your bladder  A catheter is a tiny, plastic tube that is put into your bladder to drain your urine  Your healthcare provider may tell you to use a catheter to prevent your bladder from getting too full and leaking urine  · Keep a UI record  Write down how often you leak urine and how much you leak  Make a note of what you were doing when you leaked urine  · Train your bladder  Go to the bathroom at set times, such as every 2 hours, even if you do not feel the urge to go  You can also try to hold your urine when you feel the urge to go  For example, hold your urine for 5 minutes when you feel the urge to go  As that becomes easier, hold your urine for 10 minutes  · Drink liquids as directed  Ask your healthcare provider how much liquid to drink each day and which liquids are best for you   You may need to limit the amount of liquid you drink to help control your urine leakage  Limit or do not have drinks that contain caffeine or alcohol  Do not drink any liquid right before you go to bed  · Prevent constipation  Eat a variety of high-fiber foods  Good examples are high-fiber cereals, beans, vegetables, and whole-grain breads  Prune juice may help make your bowel movement softer  Walking is the best way to trigger your intestines to have a bowel movement  · Exercise regularly and maintain a healthy weight  Ask your healthcare provider how much you should weigh and about the best exercise plan for you  Weight loss and exercise will decrease pressure on your bladder and help you control your leakage  Ask him to help you create a weight loss plan if you are overweight  When should I seek immediate care? · You have severe pain  · You are confused or cannot think clearly  When should I contact my healthcare provider? · You have a fever  · You see blood in your urine  · You have pain when you urinate  · You have new or worse pain, even after treatment  · Your mouth feels dry or you have vision changes  · Your urine is cloudy or smells bad  · You have questions or concerns about your condition or care  CARE AGREEMENT:   You have the right to help plan your care  Learn about your health condition and how it may be treated  Discuss treatment options with your caregivers to decide what care you want to receive  You always have the right to refuse treatment  The above information is an  only  It is not intended as medical advice for individual conditions or treatments  Talk to your doctor, nurse or pharmacist before following any medical regimen to see if it is safe and effective for you  © 2017 [address] [employer] Information is for End User's use only and may not be sold, redistributed or otherwise used for commercial purposes   All illustrations and images included in CareNotes® are the copyrighted property of A [employer] A M , Inc  or Benson Lanza  Cigarette Smoking and Your Health   AMBULATORY CARE:   Risks to your health if you smoke:  Nicotine and other chemicals found in tobacco damage every cell in your body  Even if you are a light smoker, you have an increased risk for cancer, heart disease, and lung disease  If you are pregnant or have diabetes, smoking increases your risk for complications  Benefits to your health if you stop smoking:   · You decrease respiratory symptoms such as coughing, wheezing, and shortness of breath  · You reduce your risk for cancers of the lung, mouth, throat, kidney, bladder, pancreas, stomach, and cervix  If you already have cancer, you increase the benefits of chemotherapy  You also reduce your risk for cancer returning or a second cancer from developing  · You reduce your risk for heart disease, blood clots, heart attack, and stroke  · You reduce your risk for lung infections, and diseases such as pneumonia, asthma, chronic bronchitis, and emphysema  · Your circulation improves  More oxygen can be delivered to your body  If you have diabetes, you lower your risk for complications, such as kidney, artery, and eye diseases  You also lower your risk for nerve damage  Nerve damage can lead to amputations, poor vision, and blindness  · You improve your body's ability to heal and to fight infections  Benefits to the health of others if you stop smoking:  Tobacco is harmful to nonsmokers who breathe in your secondhand smoke  The following are ways the health of others around you may improve when you stop smoking:  · You lower the risks for lung cancer and heart disease in nonsmoking adults  · If you are pregnant, you lower the risk for miscarriage, early delivery, low birth weight, and stillbirth  You also lower your baby's risk for SIDS, obesity, developmental delay, and neurobehavioral problems, such as ADHD  · If you have children, you lower their risk for ear infections, colds, pneumonia, bronchitis, and asthma  For more information and support to stop smoking:   · Smokefree  gov  Phone: 4- 793 - 381-4872  Web Address: www smokefrOpen Silicon  Follow up with your healthcare provider as directed:  Write down your questions so you remember to ask them during your visits  © 2017 [address] [employer] Information is for End User's use only and may not be sold, redistributed or otherwise used for commercial purposes  All illustrations and images included in CareNotes® are the copyrighted property of A [employer] M [employer] Inc  or Benson Lanza  The above information is an  only  It is not intended as medical advice for individual conditions or treatments  Talk to your doctor, nurse or pharmacist before following any medical regimen to see if it is safe and effective for you  Fall Prevention   WHAT YOU NEED TO KNOW:   What is fall prevention? Fall prevention includes ways to make your home and other areas safer  It also includes ways you can move more carefully to prevent a fall  What increases my risk for falls? · Lack of vitamin D    · Not getting enough sleep each night    · Trouble walking or keeping your balance, or foot problems    · Health conditions that cause changes in your blood pressure, vision, or muscle strength and coordination    · Medicines that make you dizzy, weak, or sleepy    · Problems seeing clearly    · Shoes that have high heels or are not supportive    · Tripping hazards, such as items left on the floor, no handrails on the stairs, or broken steps  How can I help protect myself from falls? · Stand or sit up slowly  This may help you keep your balance and prevent falls  If you need to get up during the night, sit up first  Be sure you are fully awake before you stand  Turn on the light before you start walking  Go slowly in case you are still sleepy   Make sure you will not trip over any pets sleeping in the bedroom  · Use assistive devices as directed  Your healthcare provider may suggest that you use a cane or walker to help you keep your balance  You may need to have grab bars put in your bathroom near the toilet or in the shower  · Wear shoes that fit well and have soles that   Wear shoes both inside and outside  Use slippers with good   Do not wear shoes with high heels  · Wear a personal alarm  This is a device that allows you to call 911 if you fall and need help  Ask your healthcare provider for more information  · Stay active  Exercise can help strengthen your muscles and improve your balance  Your healthcare provider may recommend water aerobics or walking  He or she may also recommend physical therapy to improve your coordination  Never start an exercise program without talking to your healthcare provider first      · Manage medical conditions  Keep all appointments with your healthcare providers  Visit your eye doctor as directed  How can I make my home safer? · Add items to prevent falls in the bathroom  Put nonslip strips on your bath or shower floor to prevent you from slipping  Use a bath mat if you do not have carpet in the bathroom  This will prevent you from falling when you step out of the bath or shower  Use a shower seat so you do not need to stand while you shower  Sit on the toilet or a chair in your bathroom to dry yourself and put on clothing  This will prevent you from losing your balance from drying or dressing yourself while you are standing  · Keep paths clear  Remove books, shoes, and other objects from walkways and stairs  Place cords for telephones and lamps out of the way so that you do not need to walk over them  Tape them down if you cannot move them  Remove small rugs  If you cannot remove a rug, secure it with double-sided tape  This will prevent you from tripping  · Install bright lights in your home  Use night lights to help light paths to the bathroom or kitchen  Always turn on the light before you start walking  · Keep items you use often on shelves within reach  Do not use a step stool to help you reach an item  · Paint or place reflective tape on the edges of your stairs  This will help you see the stairs better  Call 911 or have someone else call if:   · You have fallen and are unconscious  · You have fallen and cannot move part of your body  Contact your healthcare provider if:   · You have fallen and have pain or a headache  · You have questions or concerns about your condition or care  CARE AGREEMENT:   You have the right to help plan your care  Learn about your health condition and how it may be treated  Discuss treatment options with your caregivers to decide what care you want to receive  You always have the right to refuse treatment  The above information is an  only  It is not intended as medical advice for individual conditions or treatments  Talk to your doctor, nurse or pharmacist before following any medical regimen to see if it is safe and effective for you  © 2017 [address] [facility] Information is for End User's use only and may not be sold, redistributed or otherwise used for commercial purposes  All illustrations and images included in CareNotes® are the copyrighted property of [employer]  or Benson Lanza  Advance Directives   WHAT YOU NEED TO KNOW:   What are advance directives? Advance directives are legal documents that state your wishes and plans for medical care  These plans are made ahead of time in case you lose your ability to make decisions for yourself  Advance directives can apply to any medical decision, such as the treatments you want, and if you want to donate organs  What are the types of advance directives? There are many types of advance directives, and each state has rules about how to use them   You may choose a combination of any of the following:  · Living will: This is a written record of the treatment you want  You can also choose which treatments you do not want, which to limit, and which to stop at a certain time  This includes surgery, medicine, IV fluid, and tube feedings  · Durable power of  for healthcare [facility]): This is a written record that states who you want to make healthcare choices for you when you are unable to make them for yourself  This person, called a proxy, is usually a family member or a friend  You may choose more than 1 proxy  · Do not resuscitate (DNR) order:  A DNR order is used in case your heart stops beating or you stop breathing  It is a request not to have certain forms of treatment, such as CPR  A DNR order may be included in other types of advance directives  · Medical directive: This covers the care that you want if you are in a coma, near death, or unable to make decisions for yourself  You can list the treatments you want for each condition  Treatment may include pain medicine, surgery, blood transfusions, dialysis, IV or tube feedings, and a ventilator (breathing machine)  · Values history: This document has questions about your views, beliefs, and how you feel and think about life  This information can help others choose the care that you would choose  Why are advance directives important? An advance directive helps you control your care  Although spoken wishes may be used, it is better to have your wishes written down  Spoken wishes can be misunderstood, or not followed  Treatments may be given even if you do not want them  An advance directive may make it easier for your family to make difficult choices about your care  How do I decide what to put in my advance directives? · Make informed decisions:  Make sure you fully understand treatments or care you may receive   Think about the benefits and problems your decisions could cause for you or your family  Talk to healthcare providers if you have concerns or questions before you write down your wishes  You may also want to talk with your Judaism or , or a   Check your state laws to make sure that what you put in your advance directive is legal      · Sign all forms:  Sign and date your advance directive when you have finished  You may also need 2 witnesses to sign the forms  Witnesses cannot be your doctor or his staff, your spouse, heirs or beneficiaries, people you owe money to, or your chosen proxy  Talk to your family, proxy, and healthcare providers about your advance directive  Give each person a copy, and keep one for yourself in a place you can get to easily  Do not keep it hidden or locked away  · Review and revise your plans: You can revise your advance directive at any time, as long as you are able to make decisions  Review your plan every year, and when there are changes in your life, or your health  When you make changes, let your family, proxy, and healthcare providers know  Give each a new copy  Where can I find more information? · [employer]  [address] [place] , [address]   Phone: 6- [phone]  Phone: 5- 240 - [phone]  Web Address: http://www  aafp org  · [facility])  [address] [facility], [address]  Phone: [phone]  Phone: [phone]  Web Address: Santosh mar  [facility] AGREEMENT:   You have the right to help plan your care  To help with this plan, you must learn about your health condition and treatment options  You must also learn about advance directives and how they are used  Work with your healthcare providers to decide what care will be used to treat you  You always have the right to refuse treatment  The above information is an  only   It is not intended as medical advice for individual conditions or treatments  Talk to your doctor, nurse or pharmacist before following any medical regimen to see if it is safe and effective for you  © 2017 [address]  Information is for End User's use only and may not be sold, redistributed or otherwise used for commercial purposes  All illustrations and images included in CareNotes® are the copyrighted property of [employer]  or [address]  Thank you for enrolling in [address] [place] [address]  Please follow the instructions below to securely access your online medical record  [employer] allows you to send messages to your doctor, view your test results, renew your prescriptions, schedule appointments, and more  [facility] uses Single Sign on (SSO) Technology for you to log in and access our [facility]  [place], including [employer]  No more remembering multiple user names and passwords! We are going to guide you through, step by step, to help you set up your [employer] account which will provide access to your [employer] account  How Do I Sign Up? 1  In your Internet browser, go to [email or web address]       2  Click on the [facility] patient account and then click Dont have an                 Account? Create one now      3  Enter your demographic information and chose a user name (email address) and password  Think of one that is secure and easy to remember  Enter a Referral code if you have one (this is not your The [employer] code ) Accept the Terms and Conditions and the Privacy Policy  4  Select your security questions that you will use to reset your password should you forget it  Click Submit  5  Enter your TalkApolis Activation Code exactly as it appears below  You will not need to use this code after you have completed the sign-up process  If you do not sign up before the expiration date, you must request a new code                           [employer] Activation Code: [email or web address]  Expires: 6/10/2018 1:19 PM    6  Confirm your email address  An email confirmation was sent to you  Please open that email and click Confirm your Email   You should then be redirected to our [facility] Single sign on page, where you will log on with the user name and password you created! Proceed to the adMingle - Share Your Passion! Icon to view your personal health information  Additional Information  If you have questions, you can e-mail patient  [email or web address] or call [phone] to talk to our customer support staff  Remember, [employer] - Share Your Passion! is NOT to be used for urgent needs  For medical emergencies, dial 911

## 2018-06-07 NOTE — PROGRESS NOTES
Assessment and Plan:    Problem List Items Addressed This Visit     CVA (cerebral vascular accident) [facility])     Seen by neurology  stable         Controlled type 2 diabetes with neuropathy ([address] )     Check a1c  watchi diet         Relevant Orders    Hemoglobin A1C    CKD (chronic kidney disease) stage 3, GFR 30-59 ml/min (Chronic)     Needs to follow up  Creatinine up slightly         Insomnia    Pruritus    Relevant Medications    nystatin-triamcinolone (MYCOLOG-II) cream    triamcinolone (KENALOG) 0 1 % cream    Vitamin D deficiency     Check levels         Relevant Orders    Vitamin D 25 hydroxy    Medicare annual wellness visit, subsequent - Primary     Discussed shingrix         Screening for colon cancer    Relevant Orders    Ambulatory referral to Gastroenterology        Health Maintenance Due   Topic Date Due    Diabetic Foot Exam  04/03/1958    OPHTHALMOLOGY EXAM  04/03/1958    GLAUCOMA SCREENING 67+ YR  04/03/2015    URINE MICROALBUMIN  09/07/2017    HEMOGLOBIN A1C  06/06/2018         HPI:  Aviva Mata is a 77 y o  male here for his Subsequent Wellness Visit      Patient Active Problem List   Diagnosis    CVA (cerebral vascular accident) ([address] )    Controlled type 2 diabetes with neuropathy ([facility] )    Liver transplanted ([facility] )    History of immunosuppression therapy    History of hepatitis C    CKD (chronic kidney disease) stage 3, GFR 30-59 ml/min    Thrombocytopenia (HCC)    Congenital anomaly of accessory auricle    Cerebral arterial aneurysm    Erectile dysfunction of non-organic origin    Headache, chronic daily    Insomnia    Migraine headache    Occipital neuralgia    Other cirrhosis of liver (HCC)    Peripheral neuropathy    Prurigo nodularis    Pruritus    Spondylosis of cervical region without myelopathy or radiculopathy    Vertigo    Vitamin D deficiency    Medicare annual wellness visit, subsequent    Screening for colon cancer     Past Medical History:   Diagnosis Date    Alcoholism ([place] [address] )     Cerebral artery aneurysm     Change in mental state     last assessed 5/18/15; resolved 10/27/15    Diabetes mellitus ([place] [address] )     Drug dependence ([place] [address] )     Fatigue     last assessed 1/26/15; resolved 5/24/16    Hepatitis C     Kidney disease     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[place] [address] )     Liver transplant recipient [facility])     Renal disorder     Subdural hygroma     2/27/14; resolved 7/28/15    Thrombocytopenia ([place] [address] ) 9/20/2017     Past Surgical History:   Procedure Laterality Date    BRAIN SURGERY  02/12/2014    left frontotemporal 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    CATARACT EXTRACTION      CHOLECYSTECTOMY      LIVER TRANSPLANTATION      ROTATOR CUFF REPAIR      SHOULDER SURGERY       Family History   Problem Relation Age of Onset    Hypertension Mother      benign essential     Lung cancer Father     Diabetes Sister     Cancer Brother     Stroke Other      cva - due to embolism of cerebra artery      History   Smoking Status    Never Smoker   Smokeless Tobacco    Not on file     Comment: former smoker per allscripts      History   Alcohol Use No      History   Drug Use No     Comment: remotely quit drug use per allscripts        Current Outpatient Prescriptions   Medication Sig Dispense Refill    aspirin 81 mg chewable tablet Chew 1 tablet daily 30 tablet 0    atorvastatin (LIPITOR) 40 mg tablet Take 1 tablet by mouth every evening 30 tablet 0    Cetirizine HCl (ZYRTEC ALLERGY) 10 MG CAPS Take 1 tablet by mouth daily as needed      clopidogrel (PLAVIX) 75 mg tablet Take 1 tablet by mouth daily 30 tablet 0    glucose blood (GREER CONTOUR TEST) test strip by In Vitro route 3 (three) times a day      insulin glargine (LANTUS) 100 units/mL subcutaneous injection Inject 30 Units under the skin daily at bedtime      INSULIN SYRINGE 1CC/29G 29G X 1/2" 1 ML MISC by Does not apply route      Lancets MISC by Does not apply route      LYRICA 50 MG capsule TAKE ONE CAPSULE BY MOUTH THREE TIMES DAILY 90 capsule 5    nystatin-triamcinolone (MYCOLOG-II) cream Apply topically      pregabalin (LYRICA) 25 mg capsule Take 25 mg by mouth 2 (two) times a day      rosuvastatin (CRESTOR) 10 MG tablet Take 1 tablet by mouth daily at bedtime      tacrolimus (PROGRAF) 1 mg capsule Take 1 capsule (1 mg total) by mouth every 12 (twelve) hours 180 capsule 0    temazepam (RESTORIL) 30 mg capsule Take 1 capsule by mouth      traMADol (ULTRAM) 50 mg tablet TAKE 2 TABLETS BY MOUTH THREE TIMES DAILY 180 tablet 0    triamcinolone (KENALOG) 0 1 % cream Apply topically Twice daily      zolpidem (AMBIEN) 10 mg tablet Take 1 tablet (10 mg total) by mouth daily at bedtime 30 tablet 3    nystatin (MYCOSTATIN) cream Apply topically as needed (rash) 30 g 2    temazepam (RESTORIL) 30 mg capsule Take 1 capsule (30 mg total) by mouth daily at bedtime 30 capsule 3     No current facility-administered medications for this visit  Allergies   Allergen Reactions    Ciprofloxacin     Levaquin [Levofloxacin]     Omnipaque [Iohexol]      Immunization History   Administered Date(s) Administered    Influenza 09/24/2009, 10/04/2010, 09/27/2014, 10/27/2015, 10/01/2016, 11/10/2016, 10/02/2017    Influenza Split High Dose Preservative Free IM 10/27/2015, 10/02/2017    Influenza TIV (IM) 11/12/2012, 09/11/2013, 10/01/2016    Pneumococcal Conjugate 13-Valent 10/27/2015, 12/29/2016    Pneumococcal Polysaccharide PPV23 09/03/2013    Tdap 10/27/2015    Zoster 06/04/2013       Patient Care Team:  Kriss Gastelum, DO as PCP - General  Cameron Mohs, MD Ronita Katos, PA-C Felecia Ruck, DO      Medicare Screening Tests and Risk Assessments:  AWV Clinical     ISAR:   Previous hospitalizations?:  No       Once in a Lifetime Medicare Screening:   EKG performed?:  Yes Results:  nl   AAA screening performed? (if performed, please add date to Health Maintenance):   No Medicare Screening Tests and Risk Assessment:   AAA Risk Assessment    None Indicated:  Yes    Osteoporosis Risk Assessment    None indicated:  Yes    HIV Risk Assessment    None indicated:  Yes        Drug and Alcohol Use:   Tobacco use    Cigarettes:  never smoker    Tobacco use duration    Tobacco Cessation Readiness    Alcohol use    Alcohol use:  never    Alcohol Treatment Readiness   Illicit Drug Use    Drug use:  never    Drug type:  sedative use       Diet & Exercise:   Diet   What is your diet?:  Low Carb   How many servings a day of the following:   Exercise    Do you currently exercise?:  yes    Frequency:  daily    Type of exercise:  walking       Cognitive Impairment Screening:   Anxiety screenings preformed:   Yes Anxiety screen score:  0   Depression screening preformed:  Yes Depression screen score:  0   Cognitive Impairment Screening    Do you have difficulty learning or retaining new information?:  No Do you have difficulty handling new tasks?:  No   Do you have difficulty with reasoning?:  No Do you have difficulty with spatial ability and orientation?:  No   Do you have difficulty with language?:  No Do you have difficulty with behavior?:  No       Functional Ability/Level of Safety:   Hearing    Hearing difficulties:  No    Hearing aid:  No    Hearing Impairment Assessment    Current Activities    Status:  unlimited ADL's, unlimited IADL's, unlimited social activities, unlimited driving   Help needed with the folllowing:    Using the phone:  No Transportation:  No   Shopping:  No Preparing Meals:  No   Doing Housework:  No Doing Laundry:  No   Managing Medications:  No Managing Money:  No   ADL    Fall Risk   Have you fallen in the last 12 months?:  No    Injury History   Polypharmacy:  No Antidepressant Use:  No   Sedative Use:  Yes Antihypertensive Use:  No   Previous Fall:  No Alcohol Use:  No   Deconditioning:  No Visual Impairment:  No   Cogitive Impairment:  No Mmobility Impairment:  No Postural Hypotension:  No Urinary Incontinence:  No       Home Safety:   Are there hazards in your environment?:  No   If you fell, would you need help to get back up from the ground?:  No Do you have problems or concerns getting in/out of a bed, chair, tub, or toilet?:  No   Do you feel unsteady when walking?:  No Is your activity limited by pain?:  No   Do you have handrails and grab-bars in the home?:  Yes Are emergency numbers kept by the phone and regularly updated?:  Yes   Do you have working smoke alarms and fire extinguisher?:  Yes    Home Safety Risk Factors   Unfamilar with surroundings:  No Uneven floors:  No   Stairs or handrail saftey risk:  No Loose rugs:  No   Household clutter:  No Poor household lighting:  No   No grab bars in bathroom:  No Further evaluation needed:  No       Advanced Directives:   Advanced Directives    Living Will:  No Durable POA for healthcare:   Yes   Advanced directive:  No    Patient's End of Life Decisions    End of life decisions comments:  Discussed with wife poa  Will revisit       Urinary Incontinence:   Do you have urinary incontinence?:  No Do you have incomplete emptying?:  No   Do you urinate frequently?:  No Do you have urinary urgency?:  No   Do you have urinary hesitancy?:  No Do you have dysuria (painful and/or difficult urination)?:  No   Do you have nocturia (waking up to urinate)?:  No Do you strain when urinating (have to push to urinate)?:  No   Do you have a weak stream when urinating?:  No Do you have intermittent streaming when urinating?:  No   Do you dribble urine after finishing?:  No        Glaucoma:            Provider Screening     Preventative Screening/Counseling:   Cardiovascular Screening/Counseling:   (Labs Q5 years, EKG optional one-time)   General:  Screening Current           Diabetes Screening/Counseling:   (2 tests/year if Pre-Diabetes or 1 test/year if no Diabetes)   General:  Screening Current           Colorectal Cancer Screening/Counseling:   (FOBT Q1 yr; Flex Sig Q4 yrs or Q10 yrs after Screening Colonoscopy; Screening Colonoscpy Q2 yrs High Risk or Q10 yrs Low Risk; Barium Enema Q2 yrs High Risk or Q4 yrs Low Risk)   General:  Risks and Benefits Discussed           Prostate Cancer Screening/Counseling:   (Annual)    General:  Screening Current          Breast Cancer Screening/Counseling:   (Baseline Age 28 - 43; Annual Age 36+)         Cervical Cancer Screening/Counseling:   (Annual for High Risk or Childbearing Age with Abnormal Pap in Last 3 yrs; Every 2 all others)         Osteoporosis Screening/Counseling:   (Every 2 Yrs if at risk or more if medically necessary)   General:  Screening Not Indicated           AAA Screening/Counseling:   (Once per Lifetime with risk factors)    General:  Screening Not Indicated           Glaucoma Screening/Counseling:   (Annual)   General:  Risks and Benefits Discussed          HIV Screening/Counseling:   (Voluntary; Once annually for high risk OR 3 times for Pregnancy at diagnosis of IUP; 3rd trimester; and at Labor   General:  Screening Not Indicated           Hepatitis C Screening:             Immunizations:   Influenza (annual):   Influenza UTD This Year   Pneumococcal (Once in a Lifetime):  Lifetime Vaccine Completed       Other Preventative Couseling (Non-Medicare Wellness Visit Required):       Referrals (Non-Medicare Wellness Visit Required):   Orthopedics       Medical Equipment/Suppliers:

## 2018-06-25 DIAGNOSIS — G47.00 INSOMNIA, UNSPECIFIED TYPE: ICD-10-CM

## 2018-06-25 RX ORDER — ZOLPIDEM TARTRATE 10 MG/1
TABLET ORAL
Qty: 30 TABLET | Refills: 3 | Status: SHIPPED | OUTPATIENT
Start: 2018-06-25 | End: 2018-10-19 | Stop reason: SDUPTHER

## 2018-06-25 RX ORDER — TEMAZEPAM 30 MG/1
CAPSULE ORAL
Qty: 30 CAPSULE | Refills: 3 | Status: SHIPPED | OUTPATIENT
Start: 2018-06-25 | End: 2019-02-18 | Stop reason: SDUPTHER

## 2018-07-19 ENCOUNTER — TELEPHONE (OUTPATIENT)
Dept: FAMILY MEDICINE CLINIC | Facility: CLINIC | Age: 68
End: 2018-07-19

## 2018-07-19 DIAGNOSIS — E13.40 NEUROPATHY DUE TO SECONDARY DIABETES MELLITUS (HCC): ICD-10-CM

## 2018-07-19 DIAGNOSIS — Z94.4 LIVER TRANSPLANTED (HCC): ICD-10-CM

## 2018-07-19 RX ORDER — TRAMADOL HYDROCHLORIDE 50 MG/1
100 TABLET ORAL 3 TIMES DAILY
Qty: 180 TABLET | Refills: 0 | Status: SHIPPED | OUTPATIENT
Start: 2018-07-19 | End: 2018-08-29 | Stop reason: SDUPTHER

## 2018-07-19 RX ORDER — TACROLIMUS 1 MG/1
1 CAPSULE ORAL EVERY 12 HOURS SCHEDULED
Qty: 180 CAPSULE | Refills: 3 | Status: SHIPPED | OUTPATIENT
Start: 2018-07-19 | End: 2019-09-05 | Stop reason: SDUPTHER

## 2018-08-09 ENCOUNTER — OFFICE VISIT (OUTPATIENT)
Dept: NEPHROLOGY | Facility: CLINIC | Age: 68
End: 2018-08-09
Payer: MEDICARE

## 2018-08-09 VITALS
DIASTOLIC BLOOD PRESSURE: 78 MMHG | BODY MASS INDEX: 22.53 KG/M2 | HEIGHT: 64 IN | WEIGHT: 132 LBS | HEART RATE: 78 BPM | SYSTOLIC BLOOD PRESSURE: 120 MMHG

## 2018-08-09 DIAGNOSIS — N18.30 CKD (CHRONIC KIDNEY DISEASE) STAGE 3, GFR 30-59 ML/MIN (HCC): Primary | Chronic | ICD-10-CM

## 2018-08-09 PROCEDURE — 99213 OFFICE O/P EST LOW 20 MIN: CPT | Performed by: INTERNAL MEDICINE

## 2018-08-09 NOTE — LETTER
August 9, 2018     Kalyani Rios, [address] [facility]  [address] [address]  [address] [place] [id number]    Patient: Dorothyann Cowden   YOB: 1950   Date of Visit: 8/9/2018       Dear Dr Loan Mcknight:    Thank you for referring Dorothyann Cowden to me for evaluation  Below are my notes for this consultation  If you have questions, please do not hesitate to call me  I look forward to following your patient along with you  Sincerely,        Denis Hagen MD        CC: No Recipients  Denis Hagen MD  8/9/2018  3:01 PM  Sign at close encounter  [address] 77 y o  male MRN: [id number]  Unit/Bed#:  Encounter: [id number]  Reason for Consult:  Renal insufficiency    The patient is here for routine follow-up he has had an excellent 6 months he staying active feels great enjoying his family no complaints today  ASSESSMENT/PLAN:  1  Renal the patient has chronic renal insufficiency likely due to chronic Prograf use and chronic interstitial disease  Creatinine stable at 1 6 and has not progressed significantly over long time so there is no rapid decline  At this point patient blood pressure is excellent continue current medications and overall is doing extremely well clinically  Continue current medications no changes  SUBJECTIVE:  Review of Systems   Constitution: Negative  HENT: Negative  Eyes: Negative  Cardiovascular: Negative  Respiratory: Negative  Gastrointestinal: Negative  Genitourinary: Negative  Neurological: Negative  OBJECTIVE:  Current Weight: Weight - Scale: 59 9 kg (132 lb)  [email or web address]     Blood pressure 120/78, pulse 78, height 5' 4" (1 626 m), weight 59 9 kg (132 lb)  , Body mass index is 22 66 kg/m²      [unfilled]    Physical Exam: /78 (BP Location: Right arm, Patient Position: Sitting, Cuff Size: Standard)   Pulse 78   Ht 5' 4" (1 626 m)   Wt 59 9 kg (132 lb)   BMI 22 66 kg/m²    Physical Exam   Constitutional: He is oriented to person, place, and time  He appears well-nourished  No distress  HENT:   Mouth/Throat: No oropharyngeal exudate  Eyes: Conjunctivae are normal  No scleral icterus  Neck: Neck supple  No JVD present  Cardiovascular: Normal rate and regular rhythm  Exam reveals no friction rub  No edema  Pulmonary/Chest: Effort normal and breath sounds normal  No respiratory distress  He has no wheezes  He has no rales  Abdominal: Soft  Bowel sounds are normal  He exhibits no distension  There is no tenderness  There is no rebound  Neurological: He is alert and oriented to person, place, and time         Medications:    Current Outpatient Prescriptions:     aspirin 81 mg chewable tablet, Chew 1 tablet daily, Disp: 30 tablet, Rfl: 0    Cetirizine HCl (ZYRTEC ALLERGY) 10 MG CAPS, Take 1 tablet by mouth daily as needed, Disp: , Rfl:     glucose blood (GREER CONTOUR TEST) test strip, by In Vitro route 3 (three) times a day, Disp: , Rfl:     insulin glargine (LANTUS) 100 units/mL subcutaneous injection, Inject 30 Units under the skin daily at bedtime, Disp: , Rfl:     INSULIN SYRINGE 1CC/29G 29G X 1/2" 1 ML MISC, by Does not apply route, Disp: , Rfl:     Lancets MISC, by Does not apply route, Disp: , Rfl:     LYRICA 50 MG capsule, TAKE ONE CAPSULE BY MOUTH THREE TIMES DAILY, Disp: 90 capsule, Rfl: 5    nystatin (MYCOSTATIN) cream, Apply topically as needed (rash), Disp: 30 g, Rfl: 2    nystatin-triamcinolone (MYCOLOG-II) cream, Apply topically, Disp: , Rfl:     pregabalin (LYRICA) 25 mg capsule, Take 25 mg by mouth 2 (two) times a day, Disp: , Rfl:     rosuvastatin (CRESTOR) 10 MG tablet, Take 1 tablet by mouth daily at bedtime, Disp: , Rfl:     tacrolimus (PROGRAF) 1 mg capsule, Take 1 capsule (1 mg total) by mouth every 12 (twelve) hours, Disp: 180 capsule, Rfl: 3    temazepam (RESTORIL) 30 mg capsule, Take 1 capsule by mouth, Disp: , Rfl:     triamcinolone (KENALOG) 0 1 % cream, Apply topically Twice daily, Disp: , Rfl:     zolpidem (AMBIEN) 10 mg tablet, TAKE 1 TABLET BY MOUTH EVERY NIGHT AT BEDTIME, Disp: 30 tablet, Rfl: 3    atorvastatin (LIPITOR) 40 mg tablet, Take 1 tablet by mouth every evening, Disp: 30 tablet, Rfl: 0    clopidogrel (PLAVIX) 75 mg tablet, Take 1 tablet by mouth daily, Disp: 30 tablet, Rfl: 0    temazepam (RESTORIL) 30 mg capsule, Take 1 capsule (30 mg total) by mouth daily at bedtime, Disp: 30 capsule, Rfl: 3    temazepam (RESTORIL) 30 mg capsule, TAKE 1 CAPSULE BY MOUTH EVERY DAY AT BEDTIME, Disp: 30 capsule, Rfl: 3    traMADol (ULTRAM) 50 mg tablet, Take 2 tablets (100 mg total) by mouth 3 (three) times a day, Disp: 180 tablet, Rfl: 0    Laboratory Results:  Lab Results   Component Value Date    WBC 7 21 05/29/2018    HGB 13 6 05/29/2018    HCT 44 3 05/29/2018    MCV 79 (L) 05/29/2018    PLT  05/29/2018      Comment:      Not reported due to platelet clumping       Lab Results   Component Value Date    GLUCOSE 125 04/13/2018    CALCIUM 9 3 05/29/2018     05/29/2018    K 5 0 05/29/2018    CO2 28 05/29/2018     05/29/2018    BUN 21 05/29/2018    CREATININE 1 68 (H) 05/29/2018     Lab Results   Component Value Date    CALCIUM 9 3 05/29/2018    PHOS 3 2 04/13/2018     No results found for: LABPROT

## 2018-08-09 NOTE — PROGRESS NOTES
NEPHROLOGY PROGRESS NOTE    Yoko Browning 77 y o  male MRN: [id number]  Unit/Bed#:  Encounter: [id number]  Reason for Consult:  Renal insufficiency    The patient is here for routine follow-up he has had an excellent 6 months he staying active feels great enjoying his family no complaints today  ASSESSMENT/PLAN:  1  Renal the patient has chronic renal insufficiency likely due to chronic Prograf use and chronic interstitial disease  Creatinine stable at 1 6 and has not progressed significantly over long time so there is no rapid decline  At this point patient blood pressure is excellent continue current medications and overall is doing extremely well clinically  Continue current medications no changes  SUBJECTIVE:  Review of Systems   Constitution: Negative  HENT: Negative  Eyes: Negative  Cardiovascular: Negative  Respiratory: Negative  Gastrointestinal: Negative  Genitourinary: Negative  Neurological: Negative  OBJECTIVE:  Current Weight: Weight - Scale: 59 9 kg (132 lb)  [email or web address]:     Blood pressure 120/78, pulse 78, height 5' 4" (1 626 m), weight 59 9 kg (132 lb)  , Body mass index is 22 66 kg/m²  [unfilled]    Physical Exam: /78 (BP Location: Right arm, Patient Position: Sitting, Cuff Size: Standard)   Pulse 78   Ht 5' 4" (1 626 m)   Wt 59 9 kg (132 lb)   BMI 22 66 kg/m²   Physical Exam   Constitutional: He is oriented to person, place, and time  He appears well-nourished  No distress  HENT:   Mouth/Throat: No oropharyngeal exudate  Eyes: Conjunctivae are normal  No scleral icterus  Neck: Neck supple  No JVD present  Cardiovascular: Normal rate and regular rhythm  Exam reveals no friction rub  No edema  Pulmonary/Chest: Effort normal and breath sounds normal  No respiratory distress  He has no wheezes  He has no rales  Abdominal: Soft  Bowel sounds are normal  He exhibits no distension  There is no tenderness  There is no rebound  Neurological: He is alert and oriented to person, place, and time         Medications:    Current Outpatient Prescriptions:     aspirin 81 mg chewable tablet, Chew 1 tablet daily, Disp: 30 tablet, Rfl: 0    Cetirizine HCl (ZYRTEC ALLERGY) 10 MG CAPS, Take 1 tablet by mouth daily as needed, Disp: , Rfl:     glucose blood (GREER CONTOUR TEST) test strip, by In Vitro route 3 (three) times a day, Disp: , Rfl:     insulin glargine (LANTUS) 100 units/mL subcutaneous injection, Inject 30 Units under the skin daily at bedtime, Disp: , Rfl:     INSULIN SYRINGE 1CC/29G 29G X 1/2" 1 ML MISC, by Does not apply route, Disp: , Rfl:     Lancets MISC, by Does not apply route, Disp: , Rfl:     LYRICA 50 MG capsule, TAKE ONE CAPSULE BY MOUTH THREE TIMES DAILY, Disp: 90 capsule, Rfl: 5    nystatin (MYCOSTATIN) cream, Apply topically as needed (rash), Disp: 30 g, Rfl: 2    nystatin-triamcinolone (MYCOLOG-II) cream, Apply topically, Disp: , Rfl:     pregabalin (LYRICA) 25 mg capsule, Take 25 mg by mouth 2 (two) times a day, Disp: , Rfl:     rosuvastatin (CRESTOR) 10 MG tablet, Take 1 tablet by mouth daily at bedtime, Disp: , Rfl:     tacrolimus (PROGRAF) 1 mg capsule, Take 1 capsule (1 mg total) by mouth every 12 (twelve) hours, Disp: 180 capsule, Rfl: 3    temazepam (RESTORIL) 30 mg capsule, Take 1 capsule by mouth, Disp: , Rfl:     triamcinolone (KENALOG) 0 1 % cream, Apply topically Twice daily, Disp: , Rfl:     zolpidem (AMBIEN) 10 mg tablet, TAKE 1 TABLET BY MOUTH EVERY NIGHT AT BEDTIME, Disp: 30 tablet, Rfl: 3    atorvastatin (LIPITOR) 40 mg tablet, Take 1 tablet by mouth every evening, Disp: 30 tablet, Rfl: 0    clopidogrel (PLAVIX) 75 mg tablet, Take 1 tablet by mouth daily, Disp: 30 tablet, Rfl: 0    temazepam (RESTORIL) 30 mg capsule, Take 1 capsule (30 mg total) by mouth daily at bedtime, Disp: 30 capsule, Rfl: 3    temazepam (RESTORIL) 30 mg capsule, TAKE 1 CAPSULE BY MOUTH EVERY DAY AT BEDTIME, Disp: 30 capsule, Rfl: 3    traMADol (ULTRAM) 50 mg tablet, Take 2 tablets (100 mg total) by mouth 3 (three) times a day, Disp: 180 tablet, Rfl: 0    Laboratory Results:  Lab Results   Component Value Date    WBC 7 21 05/29/2018    HGB 13 6 05/29/2018    HCT 44 3 05/29/2018    MCV 79 (L) 05/29/2018    PLT  05/29/2018      Comment:      Not reported due to platelet clumping       Lab Results   Component Value Date    GLUCOSE 125 04/13/2018    CALCIUM 9 3 05/29/2018     05/29/2018    K 5 0 05/29/2018    CO2 28 05/29/2018     05/29/2018    BUN 21 05/29/2018    CREATININE 1 68 (H) 05/29/2018     Lab Results   Component Value Date    CALCIUM 9 3 05/29/2018    PHOS 3 2 04/13/2018     No results found for: LABPROT

## 2018-08-09 NOTE — PATIENT INSTRUCTIONS
Round as we discussed your creatinine which is the blood test your kidney function was 1 6 which is stable at your normal baseline  The tacrolimus may have affected the kidneys over the years but functions been stable and is not getting worse  And blood pressure is excellent otherwise you look like you doing great continue current medications and in June away life and your family

## 2018-08-29 DIAGNOSIS — E13.40 NEUROPATHY DUE TO SECONDARY DIABETES MELLITUS (HCC): ICD-10-CM

## 2018-08-30 RX ORDER — INSULIN GLARGINE 100 [IU]/ML
INJECTION, SOLUTION SUBCUTANEOUS
Qty: 10 ML | Refills: 5 | Status: ON HOLD | OUTPATIENT
Start: 2018-08-30 | End: 2018-12-18

## 2018-08-30 RX ORDER — TRAMADOL HYDROCHLORIDE 50 MG/1
TABLET ORAL
Qty: 180 TABLET | Refills: 0 | Status: SHIPPED | OUTPATIENT
Start: 2018-08-30 | End: 2018-10-19 | Stop reason: SDUPTHER

## 2018-08-31 DIAGNOSIS — B37.2 CANDIDIASIS OF SKIN: ICD-10-CM

## 2018-09-03 RX ORDER — NYSTATIN 100000 U/G
CREAM TOPICAL
Qty: 30 G | Refills: 0 | Status: SHIPPED | OUTPATIENT
Start: 2018-09-03 | End: 2018-10-19 | Stop reason: SDUPTHER

## 2018-09-07 ENCOUNTER — TELEPHONE (OUTPATIENT)
Dept: FAMILY MEDICINE CLINIC | Facility: CLINIC | Age: 68
End: 2018-09-07

## 2018-09-11 ENCOUNTER — OFFICE VISIT (OUTPATIENT)
Dept: FAMILY MEDICINE CLINIC | Facility: CLINIC | Age: 68
End: 2018-09-11
Payer: MEDICARE

## 2018-09-11 VITALS
DIASTOLIC BLOOD PRESSURE: 60 MMHG | HEART RATE: 70 BPM | WEIGHT: 135.2 LBS | HEIGHT: 64 IN | SYSTOLIC BLOOD PRESSURE: 126 MMHG | BODY MASS INDEX: 23.08 KG/M2 | RESPIRATION RATE: 16 BRPM | OXYGEN SATURATION: 98 % | TEMPERATURE: 97.8 F

## 2018-09-11 DIAGNOSIS — R21 RASH: ICD-10-CM

## 2018-09-11 DIAGNOSIS — J34.89 NASAL LESION: Primary | ICD-10-CM

## 2018-09-11 PROBLEM — R76.8 HEPATITIS B CORE ANTIBODY POSITIVE: Status: ACTIVE | Noted: 2017-09-25

## 2018-09-11 PROCEDURE — 99213 OFFICE O/P EST LOW 20 MIN: CPT | Performed by: FAMILY MEDICINE

## 2018-09-11 NOTE — PROGRESS NOTES
Assessment/Plan:    Problem List Items Addressed This Visit     Nasal lesion - Primary    Relevant Orders    Ambulatory referral to Plastic Surgery    Rash    Relevant Orders    Ambulatory referral to Dermatology          There are no Patient Instructions on file for this visit  No Follow-up on file  Subjective:      Patient ID: Dorothyann Cowden is a 77 y o  male  Chief Complaint   Patient presents with    Rash     Patient here with redness on hands and arms itchy also spot on nose        Nasal lesion on face for tip of nose for 1 year- from scab and he picks it  Rash on arms for 2-3 months very itch  Has tried steroid and nystatin creams without relief      Rash   This is a chronic problem  The current episode started more than 1 month ago  The affected locations include the left arm and right arm  The rash is characterized by redness  He was exposed to nothing  Pertinent negatives include no anorexia, congestion, cough, diarrhea, eye pain, facial edema, fatigue, fever, joint pain, nail changes, rhinorrhea, shortness of breath, sore throat or vomiting  Past treatments include anti-itch cream  The treatment provided no relief  The following portions of the patient's history were reviewed and updated as appropriate:  past social history    Review of Systems   Constitutional: Negative for fatigue and fever  HENT: Negative for congestion, rhinorrhea and sore throat  Eyes: Negative for pain  Respiratory: Negative for cough and shortness of breath  Gastrointestinal: Negative for anorexia, diarrhea and vomiting  Musculoskeletal: Negative for joint pain  Skin: Positive for rash  Negative for nail changes           Current Outpatient Prescriptions   Medication Sig Dispense Refill    aspirin 81 mg chewable tablet Chew 1 tablet daily 30 tablet 0    atorvastatin (LIPITOR) 40 mg tablet Take 1 tablet by mouth every evening 30 tablet 0    Cetirizine HCl (ZYRTEC ALLERGY) 10 MG CAPS Take 1 tablet by mouth daily as needed      clopidogrel (PLAVIX) 75 mg tablet Take 1 tablet by mouth daily 30 tablet 0    glucose blood (GREER CONTOUR TEST) test strip by In Vitro route 3 (three) times a day      INSULIN SYRINGE 1CC/29G 29G X 1/2" 1 ML MISC by Does not apply route      Lancets MISC by Does not apply route      LANTUS 100 UNIT/ML subcutaneous injection INJECT 24 UNITS UNDER THE SKIN EVERY NIGHT AT BEDTIME 10 mL 5    LYRICA 50 MG capsule TAKE ONE CAPSULE BY MOUTH THREE TIMES DAILY 90 capsule 5    nystatin (MYCOSTATIN) cream APPLY TOPICALLLY ON RASH AS NEEDED 30 g 0    nystatin-triamcinolone (MYCOLOG-II) cream Apply topically      pregabalin (LYRICA) 25 mg capsule Take 25 mg by mouth 2 (two) times a day      rosuvastatin (CRESTOR) 10 MG tablet Take 1 tablet by mouth daily at bedtime      tacrolimus (PROGRAF) 1 mg capsule Take 1 capsule (1 mg total) by mouth every 12 (twelve) hours 180 capsule 3    temazepam (RESTORIL) 30 mg capsule Take 1 capsule by mouth      traMADol (ULTRAM) 50 mg tablet TAKE 2 TABLETS(100 MG) BY MOUTH THREE TIMES DAILY 180 tablet 0    triamcinolone (KENALOG) 0 1 % cream Apply topically Twice daily      zolpidem (AMBIEN) 10 mg tablet TAKE 1 TABLET BY MOUTH EVERY NIGHT AT BEDTIME 30 tablet 3    temazepam (RESTORIL) 30 mg capsule Take 1 capsule (30 mg total) by mouth daily at bedtime (Patient not taking: Reported on 9/11/2018 ) 30 capsule 3    temazepam (RESTORIL) 30 mg capsule TAKE 1 CAPSULE BY MOUTH EVERY DAY AT BEDTIME (Patient not taking: Reported on 9/11/2018) 30 capsule 3     No current facility-administered medications for this visit  Objective:    /60   Pulse 70   Temp 97 8 °F (36 6 °C)   Resp 16   Ht 5' 4" (1 626 m)   Wt 61 3 kg (135 lb 3 2 oz)   SpO2 98%   BMI 23 21 kg/m²        Physical Exam   Constitutional: He appears well-developed and well-nourished  Eyes: Pupils are equal, round, and reactive to light  Neck: Normal range of motion  Neck supple  Cardiovascular: Normal rate, regular rhythm, normal heart sounds and intact distal pulses  Pulmonary/Chest: Effort normal and breath sounds normal    Abdominal: Soft  Bowel sounds are normal    Skin: Rash (tip of nose with lesion- scabbed, arms with leathery appearance) noted  Nursing note and vitals reviewed               Jose Lee DO

## 2018-09-12 ENCOUNTER — TELEPHONE (OUTPATIENT)
Dept: FAMILY MEDICINE CLINIC | Facility: CLINIC | Age: 68
End: 2018-09-12

## 2018-09-12 NOTE — TELEPHONE ENCOUNTER
NO THERE IS NO CREAM THAT I CAN SUGGEST FOR HIS ARMS  HE NEEDS TO SEE DERM   HE WAS ALREADY USING THE CREAMS THAT WOULD HAVE HELPED HIM

## 2018-09-13 ENCOUNTER — APPOINTMENT (OUTPATIENT)
Dept: LAB | Facility: HOSPITAL | Age: 68
End: 2018-09-13
Payer: MEDICARE

## 2018-09-13 ENCOUNTER — TRANSCRIBE ORDERS (OUTPATIENT)
Dept: LAB | Facility: HOSPITAL | Age: 68
End: 2018-09-13

## 2018-09-13 DIAGNOSIS — Z94.4 STATUS POST LIVER TRANSPLANT (HCC): Primary | ICD-10-CM

## 2018-09-13 DIAGNOSIS — Z94.4 STATUS POST LIVER TRANSPLANT (HCC): ICD-10-CM

## 2018-09-13 LAB
ALBUMIN SERPL BCP-MCNC: 3.8 G/DL (ref 3.5–5)
ALP SERPL-CCNC: 95 U/L (ref 46–116)
ALT SERPL W P-5'-P-CCNC: 18 U/L (ref 12–78)
AST SERPL W P-5'-P-CCNC: 20 U/L (ref 5–45)
BILIRUB DIRECT SERPL-MCNC: 0.12 MG/DL (ref 0–0.2)
BILIRUB SERPL-MCNC: 0.36 MG/DL (ref 0.2–1)
BUN SERPL-MCNC: 29 MG/DL (ref 5–25)
CALCIUM SERPL-MCNC: 8.9 MG/DL (ref 8.3–10.1)
CHLORIDE SERPL-SCNC: 103 MMOL/L (ref 100–108)
CO2 SERPL-SCNC: 27 MMOL/L (ref 21–32)
ERYTHROCYTE [DISTWIDTH] IN BLOOD BY AUTOMATED COUNT: 14.4 % (ref 11.6–15.1)
GLUCOSE SERPL-MCNC: 157 MG/DL (ref 65–140)
HCT VFR BLD AUTO: 41.5 % (ref 36.5–49.3)
HGB BLD-MCNC: 12.2 G/DL (ref 12–17)
INR PPP: 1.04 (ref 0.86–1.17)
LDH SERPL-CCNC: 185 U/L (ref 81–234)
MAGNESIUM SERPL-MCNC: 1.9 MG/DL (ref 1.6–2.6)
MCH RBC QN AUTO: 23.8 PG (ref 26.8–34.3)
MCHC RBC AUTO-ENTMCNC: 29.4 G/DL (ref 31.4–37.4)
MCV RBC AUTO: 81 FL (ref 82–98)
PHOSPHATE SERPL-MCNC: 3.8 MG/DL (ref 2.3–4.1)
PLATELET # BLD AUTO: 125 THOUSANDS/UL (ref 149–390)
PMV BLD AUTO: 12.7 FL (ref 8.9–12.7)
POTASSIUM SERPL-SCNC: 4.8 MMOL/L (ref 3.5–5.3)
PROT SERPL-MCNC: 8.4 G/DL (ref 6.4–8.2)
PROTHROMBIN TIME: 13.7 SECONDS (ref 11.8–14.2)
RBC # BLD AUTO: 5.13 MILLION/UL (ref 3.88–5.62)
SODIUM SERPL-SCNC: 137 MMOL/L (ref 136–145)
WBC # BLD AUTO: 6.72 THOUSAND/UL (ref 4.31–10.16)

## 2018-09-13 PROCEDURE — 83735 ASSAY OF MAGNESIUM: CPT

## 2018-09-13 PROCEDURE — 83615 LACTATE (LD) (LDH) ENZYME: CPT

## 2018-09-13 PROCEDURE — 85610 PROTHROMBIN TIME: CPT

## 2018-09-13 PROCEDURE — 80076 HEPATIC FUNCTION PANEL: CPT

## 2018-09-13 PROCEDURE — 84520 ASSAY OF UREA NITROGEN: CPT

## 2018-09-13 PROCEDURE — 85027 COMPLETE CBC AUTOMATED: CPT

## 2018-09-13 PROCEDURE — 84100 ASSAY OF PHOSPHORUS: CPT

## 2018-09-13 PROCEDURE — 80197 ASSAY OF TACROLIMUS: CPT

## 2018-09-13 PROCEDURE — 82310 ASSAY OF CALCIUM: CPT

## 2018-09-13 PROCEDURE — 36415 COLL VENOUS BLD VENIPUNCTURE: CPT

## 2018-09-13 PROCEDURE — 80051 ELECTROLYTE PANEL: CPT

## 2018-09-13 PROCEDURE — 82947 ASSAY GLUCOSE BLOOD QUANT: CPT

## 2018-09-14 LAB — TACROLIMUS BLD-MCNC: 3.8 NG/ML (ref 2–20)

## 2018-09-24 DIAGNOSIS — B37.2 CANDIDIASIS OF SKIN: ICD-10-CM

## 2018-09-24 DIAGNOSIS — E78.2 MIXED HYPERLIPIDEMIA: Primary | ICD-10-CM

## 2018-09-24 RX ORDER — NYSTATIN 100000 U/G
CREAM TOPICAL
Qty: 30 G | Refills: 0 | Status: SHIPPED | OUTPATIENT
Start: 2018-09-24 | End: 2018-12-18 | Stop reason: HOSPADM

## 2018-09-24 RX ORDER — ROSUVASTATIN CALCIUM 10 MG/1
10 TABLET, COATED ORAL
Qty: 90 TABLET | Refills: 3 | Status: SHIPPED | OUTPATIENT
Start: 2018-09-24 | End: 2018-12-18 | Stop reason: HOSPADM

## 2018-09-24 NOTE — TELEPHONE ENCOUNTER
Please refill and send to [facility] [phone]     rosuvastatin (CRESTOR) 10 MG tablet, Dose: 1 tablet, Route: Oral, Frequency: Daily at bedtime, Dispense Quantity: 90, Sig: Take 1 tablet by mouth daily at bedtime

## 2018-10-19 DIAGNOSIS — E13.40 NEUROPATHY DUE TO SECONDARY DIABETES MELLITUS (HCC): ICD-10-CM

## 2018-10-19 DIAGNOSIS — G47.00 INSOMNIA, UNSPECIFIED TYPE: ICD-10-CM

## 2018-10-19 RX ORDER — TEMAZEPAM 30 MG/1
30 CAPSULE ORAL ONCE
Qty: 30 CAPSULE | Refills: 3 | Status: SHIPPED | OUTPATIENT
Start: 2018-10-19 | End: 2018-10-19 | Stop reason: SDUPTHER

## 2018-10-19 RX ORDER — TRAMADOL HYDROCHLORIDE 50 MG/1
100 TABLET ORAL EVERY 8 HOURS PRN
Qty: 180 TABLET | Refills: 0 | Status: SHIPPED | OUTPATIENT
Start: 2018-10-19 | End: 2018-12-04 | Stop reason: SDUPTHER

## 2018-10-19 RX ORDER — ZOLPIDEM TARTRATE 10 MG/1
10 TABLET ORAL
Qty: 30 TABLET | Refills: 3 | Status: SHIPPED | OUTPATIENT
Start: 2018-10-19 | End: 2018-12-18 | Stop reason: HOSPADM

## 2018-12-03 PROBLEM — Z86.73 PERSONAL HISTORY OF TRANSIENT ISCHEMIC ATTACK (TIA), AND CEREBRAL INFARCTION WITHOUT RESIDUAL DEFICITS: Status: ACTIVE | Noted: 2018-12-03

## 2018-12-03 PROBLEM — Z79.4: Status: ACTIVE | Noted: 2017-09-18

## 2018-12-04 DIAGNOSIS — E13.40 NEUROPATHY DUE TO SECONDARY DIABETES MELLITUS (HCC): ICD-10-CM

## 2018-12-04 DIAGNOSIS — E11.9 CONTROLLED TYPE 2 DIABETES MELLITUS WITHOUT COMPLICATION, WITH LONG-TERM CURRENT USE OF INSULIN (HCC): Primary | ICD-10-CM

## 2018-12-04 DIAGNOSIS — Z79.4 CONTROLLED TYPE 2 DIABETES MELLITUS WITHOUT COMPLICATION, WITH LONG-TERM CURRENT USE OF INSULIN (HCC): Primary | ICD-10-CM

## 2018-12-04 RX ORDER — TRAMADOL HYDROCHLORIDE 50 MG/1
TABLET ORAL
Qty: 180 TABLET | Refills: 0 | Status: SHIPPED | OUTPATIENT
Start: 2018-12-04 | End: 2019-01-23 | Stop reason: SDUPTHER

## 2018-12-11 ENCOUNTER — APPOINTMENT (EMERGENCY)
Dept: RADIOLOGY | Facility: HOSPITAL | Age: 68
DRG: 064 | End: 2018-12-11
Payer: MEDICARE

## 2018-12-11 ENCOUNTER — HOSPITAL ENCOUNTER (INPATIENT)
Facility: HOSPITAL | Age: 68
LOS: 7 days | Discharge: HOME HEALTH | DRG: 064 | End: 2018-12-18
Attending: EMERGENCY MEDICINE | Admitting: INTERNAL MEDICINE
Payer: MEDICARE

## 2018-12-11 DIAGNOSIS — Z94.4 LIVER REPLACED BY TRANSPLANT (HCC): ICD-10-CM

## 2018-12-11 DIAGNOSIS — E87.1 HYPONATREMIA: ICD-10-CM

## 2018-12-11 DIAGNOSIS — G47.00 INSOMNIA, UNSPECIFIED TYPE: ICD-10-CM

## 2018-12-11 DIAGNOSIS — I63.9 CEREBROVASCULAR ACCIDENT (CVA), UNSPECIFIED MECHANISM (HCC): ICD-10-CM

## 2018-12-11 DIAGNOSIS — E86.0 DEHYDRATION: ICD-10-CM

## 2018-12-11 DIAGNOSIS — E13.40 NEUROPATHY DUE TO SECONDARY DIABETES MELLITUS (HCC): ICD-10-CM

## 2018-12-11 DIAGNOSIS — G93.40 ACUTE ENCEPHALOPATHY: Primary | ICD-10-CM

## 2018-12-11 DIAGNOSIS — Z92.25 HISTORY OF IMMUNOSUPPRESSION THERAPY: ICD-10-CM

## 2018-12-11 DIAGNOSIS — R79.89 LACTATE BLOOD INCREASE: ICD-10-CM

## 2018-12-11 DIAGNOSIS — G93.40 ENCEPHALOPATHY ACUTE: ICD-10-CM

## 2018-12-11 DIAGNOSIS — I63.9 LEFT THALAMIC INFARCTION (HCC): ICD-10-CM

## 2018-12-11 PROBLEM — N17.9 ACUTE RENAL FAILURE SUPERIMPOSED ON STAGE 3 CHRONIC KIDNEY DISEASE (HCC): Status: ACTIVE | Noted: 2018-12-11

## 2018-12-11 PROBLEM — N18.30 ACUTE RENAL FAILURE SUPERIMPOSED ON STAGE 3 CHRONIC KIDNEY DISEASE (HCC): Status: ACTIVE | Noted: 2018-12-11

## 2018-12-11 PROBLEM — A41.9 SEPSIS (HCC): Status: ACTIVE | Noted: 2018-12-11

## 2018-12-11 LAB
ALBUMIN SERPL BCP-MCNC: 3.6 G/DL (ref 3.5–5)
ALP SERPL-CCNC: 155 U/L (ref 46–116)
ALT SERPL W P-5'-P-CCNC: 29 U/L (ref 12–78)
AMMONIA PLAS-SCNC: 15 UMOL/L (ref 11–35)
AMPHETAMINES SERPL QL SCN: NEGATIVE
ANION GAP SERPL CALCULATED.3IONS-SCNC: 10 MMOL/L (ref 4–13)
APAP SERPL-MCNC: <2 UG/ML (ref 10–30)
AST SERPL W P-5'-P-CCNC: 34 U/L (ref 5–45)
ATRIAL RATE: 300 BPM
BACTERIA UR QL AUTO: ABNORMAL /HPF
BARBITURATES UR QL: NEGATIVE
BASOPHILS # BLD AUTO: 0.03 THOUSANDS/ΜL (ref 0–0.1)
BASOPHILS NFR BLD AUTO: 0 % (ref 0–1)
BENZODIAZ UR QL: NEGATIVE
BILIRUB SERPL-MCNC: 0.52 MG/DL (ref 0.2–1)
BILIRUB UR QL STRIP: NEGATIVE
BUN SERPL-MCNC: 24 MG/DL (ref 5–25)
CALCIUM SERPL-MCNC: 9.8 MG/DL (ref 8.3–10.1)
CHLORIDE SERPL-SCNC: 94 MMOL/L (ref 100–108)
CLARITY UR: CLEAR
CO2 SERPL-SCNC: 22 MMOL/L (ref 21–32)
COCAINE UR QL: NEGATIVE
COLOR UR: YELLOW
COLOR, POC: NORMAL
CREAT SERPL-MCNC: 2 MG/DL (ref 0.6–1.3)
EOSINOPHIL # BLD AUTO: 0.01 THOUSAND/ΜL (ref 0–0.61)
EOSINOPHIL NFR BLD AUTO: 0 % (ref 0–6)
ERYTHROCYTE [DISTWIDTH] IN BLOOD BY AUTOMATED COUNT: 14.3 % (ref 11.6–15.1)
ETHANOL SERPL-MCNC: <3 MG/DL (ref 0–3)
GAS + CO PNL BLDA: 1.6 % (ref 0–1.5)
GFR SERPL CREATININE-BSD FRML MDRD: 33 ML/MIN/1.73SQ M
GLUCOSE SERPL-MCNC: 172 MG/DL (ref 65–140)
GLUCOSE SERPL-MCNC: 300 MG/DL (ref 65–140)
GLUCOSE SERPL-MCNC: 321 MG/DL (ref 65–140)
GLUCOSE SERPL-MCNC: 407 MG/DL (ref 65–140)
GLUCOSE UR STRIP-MCNC: ABNORMAL MG/DL
HCT VFR BLD AUTO: 38.9 % (ref 36.5–49.3)
HGB BLD-MCNC: 12.2 G/DL (ref 12–17)
HGB UR QL STRIP.AUTO: ABNORMAL
HYALINE CASTS #/AREA URNS LPF: ABNORMAL /LPF
IMM GRANULOCYTES # BLD AUTO: 0.04 THOUSAND/UL (ref 0–0.2)
IMM GRANULOCYTES NFR BLD AUTO: 0 % (ref 0–2)
INR PPP: 1.19 (ref 0.86–1.17)
KETONES UR STRIP-MCNC: NEGATIVE MG/DL
LACTATE SERPL-SCNC: 2.3 MMOL/L (ref 0.5–2)
LACTATE SERPL-SCNC: 2.8 MMOL/L (ref 0.5–2)
LACTATE SERPL-SCNC: 5.4 MMOL/L (ref 0.5–2)
LEUKOCYTE ESTERASE UR QL STRIP: NEGATIVE
LIPASE SERPL-CCNC: 97 U/L (ref 73–393)
LYMPHOCYTES # BLD AUTO: 1.43 THOUSANDS/ΜL (ref 0.6–4.47)
LYMPHOCYTES NFR BLD AUTO: 14 % (ref 14–44)
MCH RBC QN AUTO: 24.2 PG (ref 26.8–34.3)
MCHC RBC AUTO-ENTMCNC: 31.4 G/DL (ref 31.4–37.4)
MCV RBC AUTO: 77 FL (ref 82–98)
METHADONE UR QL: NEGATIVE
MONOCYTES # BLD AUTO: 0.57 THOUSAND/ΜL (ref 0.17–1.22)
MONOCYTES NFR BLD AUTO: 6 % (ref 4–12)
NEUTROPHILS # BLD AUTO: 8.36 THOUSANDS/ΜL (ref 1.85–7.62)
NEUTS SEG NFR BLD AUTO: 80 % (ref 43–75)
NITRITE UR QL STRIP: NEGATIVE
NON-SQ EPI CELLS URNS QL MICRO: ABNORMAL /HPF
NRBC BLD AUTO-RTO: 0 /100 WBCS
OPIATES UR QL SCN: NEGATIVE
PCP UR QL: NEGATIVE
PH UR STRIP.AUTO: 8.5 [PH] (ref 4.5–8)
PLATELET # BLD AUTO: 122 THOUSANDS/UL (ref 149–390)
PLATELET # BLD AUTO: 124 THOUSANDS/UL (ref 149–390)
PMV BLD AUTO: 12.5 FL (ref 8.9–12.7)
PMV BLD AUTO: 13.3 FL (ref 8.9–12.7)
POTASSIUM SERPL-SCNC: 4.3 MMOL/L (ref 3.5–5.3)
PROCALCITONIN SERPL-MCNC: 0.15 NG/ML
PROT SERPL-MCNC: 8.6 G/DL (ref 6.4–8.2)
PROT UR STRIP-MCNC: ABNORMAL MG/DL
PROTHROMBIN TIME: 15.2 SECONDS (ref 11.8–14.2)
QRS AXIS: 11 DEGREES
QRSD INTERVAL: 74 MS
QT INTERVAL: 320 MS
QTC INTERVAL: 400 MS
RBC # BLD AUTO: 5.04 MILLION/UL (ref 3.88–5.62)
RBC #/AREA URNS AUTO: ABNORMAL /HPF
SALICYLATES SERPL-MCNC: <3 MG/DL (ref 3–20)
SODIUM SERPL-SCNC: 126 MMOL/L (ref 136–145)
SP GR UR STRIP.AUTO: 1.02 (ref 1–1.03)
T WAVE AXIS: 48 DEGREES
THC UR QL: NEGATIVE
TROPONIN I SERPL-MCNC: <0.02 NG/ML
TSH SERPL DL<=0.05 MIU/L-ACNC: 1.99 UIU/ML (ref 0.36–3.74)
UROBILINOGEN UR QL STRIP.AUTO: 0.2 E.U./DL
VENTRICULAR RATE: 94 BPM
WBC # BLD AUTO: 10.44 THOUSAND/UL (ref 4.31–10.16)
WBC #/AREA URNS AUTO: ABNORMAL /HPF

## 2018-12-11 PROCEDURE — 80307 DRUG TEST PRSMV CHEM ANLYZR: CPT | Performed by: EMERGENCY MEDICINE

## 2018-12-11 PROCEDURE — 36415 COLL VENOUS BLD VENIPUNCTURE: CPT | Performed by: EMERGENCY MEDICINE

## 2018-12-11 PROCEDURE — 82140 ASSAY OF AMMONIA: CPT | Performed by: EMERGENCY MEDICINE

## 2018-12-11 PROCEDURE — 82948 REAGENT STRIP/BLOOD GLUCOSE: CPT

## 2018-12-11 PROCEDURE — 93010 ELECTROCARDIOGRAM REPORT: CPT | Performed by: INTERNAL MEDICINE

## 2018-12-11 PROCEDURE — 96372 THER/PROPH/DIAG INJ SC/IM: CPT

## 2018-12-11 PROCEDURE — 84484 ASSAY OF TROPONIN QUANT: CPT | Performed by: EMERGENCY MEDICINE

## 2018-12-11 PROCEDURE — 87040 BLOOD CULTURE FOR BACTERIA: CPT | Performed by: EMERGENCY MEDICINE

## 2018-12-11 PROCEDURE — 74176 CT ABD & PELVIS W/O CONTRAST: CPT

## 2018-12-11 PROCEDURE — 84145 PROCALCITONIN (PCT): CPT | Performed by: INTERNAL MEDICINE

## 2018-12-11 PROCEDURE — 80329 ANALGESICS NON-OPIOID 1 OR 2: CPT | Performed by: EMERGENCY MEDICINE

## 2018-12-11 PROCEDURE — 83605 ASSAY OF LACTIC ACID: CPT | Performed by: INTERNAL MEDICINE

## 2018-12-11 PROCEDURE — 85610 PROTHROMBIN TIME: CPT | Performed by: INTERNAL MEDICINE

## 2018-12-11 PROCEDURE — 84443 ASSAY THYROID STIM HORMONE: CPT | Performed by: EMERGENCY MEDICINE

## 2018-12-11 PROCEDURE — 99223 1ST HOSP IP/OBS HIGH 75: CPT | Performed by: INTERNAL MEDICINE

## 2018-12-11 PROCEDURE — 00JU3ZZ INSPECTION OF SPINAL CANAL, PERCUTANEOUS APPROACH: ICD-10-PCS | Performed by: INTERNAL MEDICINE

## 2018-12-11 PROCEDURE — 1124F ACP DISCUSS-NO DSCNMKR DOCD: CPT | Performed by: PSYCHIATRY & NEUROLOGY

## 2018-12-11 PROCEDURE — 80320 DRUG SCREEN QUANTALCOHOLS: CPT | Performed by: EMERGENCY MEDICINE

## 2018-12-11 PROCEDURE — 96374 THER/PROPH/DIAG INJ IV PUSH: CPT

## 2018-12-11 PROCEDURE — 70450 CT HEAD/BRAIN W/O DYE: CPT

## 2018-12-11 PROCEDURE — 81001 URINALYSIS AUTO W/SCOPE: CPT

## 2018-12-11 PROCEDURE — 71045 X-RAY EXAM CHEST 1 VIEW: CPT

## 2018-12-11 PROCEDURE — 82375 ASSAY CARBOXYHB QUANT: CPT | Performed by: EMERGENCY MEDICINE

## 2018-12-11 PROCEDURE — 96361 HYDRATE IV INFUSION ADD-ON: CPT

## 2018-12-11 PROCEDURE — 99285 EMERGENCY DEPT VISIT HI MDM: CPT

## 2018-12-11 PROCEDURE — 83690 ASSAY OF LIPASE: CPT | Performed by: EMERGENCY MEDICINE

## 2018-12-11 PROCEDURE — 83605 ASSAY OF LACTIC ACID: CPT | Performed by: EMERGENCY MEDICINE

## 2018-12-11 PROCEDURE — 80053 COMPREHEN METABOLIC PANEL: CPT | Performed by: EMERGENCY MEDICINE

## 2018-12-11 PROCEDURE — 96375 TX/PRO/DX INJ NEW DRUG ADDON: CPT

## 2018-12-11 PROCEDURE — 85049 AUTOMATED PLATELET COUNT: CPT | Performed by: INTERNAL MEDICINE

## 2018-12-11 PROCEDURE — 93005 ELECTROCARDIOGRAM TRACING: CPT

## 2018-12-11 PROCEDURE — 85025 COMPLETE CBC W/AUTO DIFF WBC: CPT | Performed by: EMERGENCY MEDICINE

## 2018-12-11 RX ORDER — FENTANYL CITRATE 50 UG/ML
INJECTION, SOLUTION INTRAMUSCULAR; INTRAVENOUS
Status: COMPLETED
Start: 2018-12-11 | End: 2018-12-11

## 2018-12-11 RX ORDER — MIDAZOLAM HYDROCHLORIDE 1 MG/ML
2 INJECTION INTRAMUSCULAR; INTRAVENOUS ONCE
Status: DISCONTINUED | OUTPATIENT
Start: 2018-12-11 | End: 2018-12-11

## 2018-12-11 RX ORDER — SODIUM CHLORIDE 9 MG/ML
100 INJECTION, SOLUTION INTRAVENOUS CONTINUOUS
Status: DISCONTINUED | OUTPATIENT
Start: 2018-12-11 | End: 2018-12-12

## 2018-12-11 RX ORDER — INSULIN GLARGINE 100 [IU]/ML
24 INJECTION, SOLUTION SUBCUTANEOUS
Status: DISCONTINUED | OUTPATIENT
Start: 2018-12-11 | End: 2018-12-12

## 2018-12-11 RX ORDER — FENTANYL CITRATE 50 UG/ML
50 INJECTION, SOLUTION INTRAMUSCULAR; INTRAVENOUS ONCE
Status: COMPLETED | OUTPATIENT
Start: 2018-12-11 | End: 2018-12-11

## 2018-12-11 RX ORDER — CLOPIDOGREL BISULFATE 75 MG/1
75 TABLET ORAL DAILY
Status: DISCONTINUED | OUTPATIENT
Start: 2018-12-12 | End: 2018-12-12

## 2018-12-11 RX ORDER — OLANZAPINE 10 MG/1
10 INJECTION, POWDER, LYOPHILIZED, FOR SOLUTION INTRAMUSCULAR ONCE
Status: COMPLETED | OUTPATIENT
Start: 2018-12-11 | End: 2018-12-11

## 2018-12-11 RX ORDER — LIDOCAINE HYDROCHLORIDE 10 MG/ML
10 INJECTION, SOLUTION EPIDURAL; INFILTRATION; INTRACAUDAL; PERINEURAL ONCE
Status: COMPLETED | OUTPATIENT
Start: 2018-12-11 | End: 2018-12-11

## 2018-12-11 RX ORDER — TACROLIMUS 1 MG/1
1 CAPSULE ORAL EVERY 12 HOURS SCHEDULED
Status: DISCONTINUED | OUTPATIENT
Start: 2018-12-11 | End: 2018-12-18 | Stop reason: HOSPADM

## 2018-12-11 RX ORDER — VANCOMYCIN HYDROCHLORIDE 1 G/200ML
15 INJECTION, SOLUTION INTRAVENOUS EVERY 24 HOURS
Status: DISCONTINUED | OUTPATIENT
Start: 2018-12-12 | End: 2018-12-12 | Stop reason: DRUGHIGH

## 2018-12-11 RX ORDER — OLANZAPINE 10 MG/1
INJECTION, POWDER, LYOPHILIZED, FOR SOLUTION INTRAMUSCULAR
Status: DISPENSED
Start: 2018-12-11 | End: 2018-12-12

## 2018-12-11 RX ORDER — OLANZAPINE 10 MG/1
10 INJECTION, POWDER, LYOPHILIZED, FOR SOLUTION INTRAMUSCULAR EVERY 8 HOURS PRN
Status: DISCONTINUED | OUTPATIENT
Start: 2018-12-11 | End: 2018-12-14

## 2018-12-11 RX ORDER — HEPARIN SODIUM 5000 [USP'U]/ML
5000 INJECTION, SOLUTION INTRAVENOUS; SUBCUTANEOUS EVERY 8 HOURS SCHEDULED
Status: DISCONTINUED | OUTPATIENT
Start: 2018-12-11 | End: 2018-12-18 | Stop reason: HOSPADM

## 2018-12-11 RX ORDER — FENTANYL CITRATE 50 UG/ML
100 INJECTION, SOLUTION INTRAMUSCULAR; INTRAVENOUS ONCE
Status: DISCONTINUED | OUTPATIENT
Start: 2018-12-11 | End: 2018-12-12

## 2018-12-11 RX ADMIN — FENTANYL CITRATE 50 MCG: 50 INJECTION, SOLUTION INTRAMUSCULAR; INTRAVENOUS at 14:36

## 2018-12-11 RX ADMIN — OLANZAPINE 10 MG: 10 INJECTION, POWDER, FOR SOLUTION INTRAMUSCULAR at 18:00

## 2018-12-11 RX ADMIN — VANCOMYCIN HYDROCHLORIDE 1250 MG: 10 INJECTION, POWDER, LYOPHILIZED, FOR SOLUTION INTRAVENOUS at 22:32

## 2018-12-11 RX ADMIN — WATER: 1 INJECTION INTRAMUSCULAR; INTRAVENOUS; SUBCUTANEOUS at 16:58

## 2018-12-11 RX ADMIN — SODIUM CHLORIDE 1000 ML: 0.9 INJECTION, SOLUTION INTRAVENOUS at 18:51

## 2018-12-11 RX ADMIN — WATER 2.1 ML: 1 INJECTION INTRAMUSCULAR; INTRAVENOUS; SUBCUTANEOUS at 18:00

## 2018-12-11 RX ADMIN — SODIUM CHLORIDE 1000 ML: 0.9 INJECTION, SOLUTION INTRAVENOUS at 14:42

## 2018-12-11 RX ADMIN — CEFTRIAXONE SODIUM 2000 MG: 10 INJECTION, POWDER, FOR SOLUTION INTRAVENOUS at 19:26

## 2018-12-11 RX ADMIN — LIDOCAINE HYDROCHLORIDE 10 ML: 10 INJECTION, SOLUTION EPIDURAL; INFILTRATION; INTRACAUDAL; PERINEURAL at 16:42

## 2018-12-11 RX ADMIN — OLANZAPINE 5 MG: 10 INJECTION, POWDER, FOR SOLUTION INTRAMUSCULAR at 16:51

## 2018-12-11 RX ADMIN — ACYCLOVIR SODIUM 600 MG: 50 INJECTION, SOLUTION INTRAVENOUS at 22:33

## 2018-12-11 RX ADMIN — THIAMINE HYDROCHLORIDE 100 MG: 100 INJECTION, SOLUTION INTRAMUSCULAR; INTRAVENOUS at 18:22

## 2018-12-11 RX ADMIN — AMPICILLIN SODIUM 2000 MG: 2 INJECTION, POWDER, FOR SOLUTION INTRAMUSCULAR; INTRAVENOUS at 20:06

## 2018-12-11 RX ADMIN — SODIUM CHLORIDE 100 ML/HR: 9 INJECTION, SOLUTION INTRAVENOUS at 22:31

## 2018-12-11 NOTE — ED ATTENDING ATTESTATION
Myla Rivas MD, saw and evaluated the patient  I have discussed the patient with the resident/non-physician practitioner and agree with the resident's/non-physician practitioner's findings, Plan of Care, and MDM as documented in the resident's/non-physician practitioner's note, except where noted  All available labs and Radiology studies were reviewed  At this point I agree with the current assessment done in the Emergency Department  I have conducted an independent evaluation of this patient a history and physical is as follows:    Patient is unable to provide a history and so the history is obtained from the patient's wife  The patient's wife states that the patient was in his normal state of good health yesterday  The patient went to bed at his normal time last night and woke from his sleep at 3:00 a m  Complaining of a severe headache  The patient also had an episode of vomiting  The patient's wife reports that the patient left her bedroom and went to another room and she went back to sleep  When she got up at 10 o'clock this morning she found the patient in the liver room still complaining of a severe headache and reported that he had multiple episodes of vomiting  The patient's wife reports that the patient appeared to be somewhat disoriented and his speech was slurred  Eventually, when he did not improve, the patient's wife brought him to the hospital   The patient is moaning holding his head but will not answer questions  Physical exam demonstrates a male appears to be uncomfortable  HEENT demonstrates no evidence of craniofacial trauma  The neck was supple and nontender  Lungs are clear with equal breath sounds  The heart had a regular rate rhythm  The abdomen is soft and nontender  The patient was moving all extremities    The patient appeared to have normal strength in all extremities but could not cooperate with a neurologic exam   Critical Care Time  [facility] Time    Procedures

## 2018-12-11 NOTE — ED PROVIDER NOTES
History  Chief Complaint   Patient presents with    Flu Symptoms     pt reports flu symptoms at home, vomiting, fever  pt family also reports he has been disoriented     Patient is a 25-year-old male with past medical history of CVA, cerebral artery aneurysm posterior communicating artery, chronic kidney disease, status post liver transplant on tacrolimus presenting to the emergency department for acute altered mental status that started this morning  Patient's wife states that the patient was completely normal up until 3:00 a m  This morning, when he woke and complained of severe headache, vomited multiple times, nonbloody and non bilious, patient's wife went back to sleep  She states she woke up, found him in the living room still complaining of headache, but "not making sense, and "and slurring his speech"  Wife states the patient did not recognize her this morning when she tried to speak to him; she states that he cannot tell her the date or time, nor has he been oriented to place  No fever that wife has noticed, he has had rhinorrhea for the last 24 hours without other symptoms, no cough or congestion  Denies drug use, denies alcohol use  Patient does have a former history of alcohol abuse, prior to his liver transplant 18 years ago, his wife states that he has had no alcohol since then  Normal bowel movements, no urinary complaints or full review of systems unobtainable due to altered mental status  Patient's wife has been recently ill as well, headache and upper respiratory type illness  On exam VSS, T 99 4 rectal, head atraumatic, no nuchal rigidity, heart RRR, lungs CTA bilaterally; patient is moving all four extremities, pupils 2mm bilaterally reactive, no ophthalmoplegia, no nystagmus; patient clutching his head and abdomen           History provided by:  Spouse  History limited by:  Mental status change   used: No    Altered Mental Status   Presenting symptoms: behavior changes, confusion and disorientation    Most recent episode: Today  Episode history:  Continuous  Timing:  Constant  Progression:  Unchanged  Chronicity:  New  Context: not alcohol use, not dementia, not drug use, not head injury, not homeless, taking medications as prescribed and not recent change in medication    Associated symptoms: headaches, slurred speech and vomiting        Prior to Admission Medications   Prescriptions Last Dose Informant Patient Reported? Taking?    GREER CONTOUR TEST test strip   No No   Sig: TEST THREE TIMES DAILY   Cetirizine HCl (ZYRTEC ALLERGY) 10 MG CAPS   Yes No   Sig: Take 1 tablet by mouth daily as needed   INSULIN SYRINGE 1CC/29G 29G X 1/2" 1 ML MISC   Yes No   Sig: by Does not apply route   LANTUS 100 UNIT/ML subcutaneous injection   No No   Sig: INJECT 24 UNITS UNDER THE SKIN EVERY NIGHT AT BEDTIME   LYRICA 50 MG capsule   No No   Sig: TAKE ONE CAPSULE BY MOUTH THREE TIMES DAILY   Lancets MISC   Yes No   Sig: by Does not apply route   aspirin 81 mg chewable tablet   No No   Sig: Chew 1 tablet daily   atorvastatin (LIPITOR) 40 mg tablet   No No   Sig: Take 1 tablet by mouth every evening   clopidogrel (PLAVIX) 75 mg tablet   No No   Sig: Take 1 tablet by mouth daily   nystatin (MYCOSTATIN) cream   No No   Sig: APPLY TOPICALLLY ON RASH AS NEEDED   nystatin-triamcinolone (MYCOLOG-II) cream   Yes No   Sig: Apply topically   pregabalin (LYRICA) 25 mg capsule   Yes No   Sig: Take 25 mg by mouth 2 (two) times a day   rosuvastatin (CRESTOR) 10 MG tablet   No No   Sig: Take 1 tablet (10 mg total) by mouth daily at bedtime   tacrolimus (PROGRAF) 1 mg capsule   No No   Sig: Take 1 capsule (1 mg total) by mouth every 12 (twelve) hours   temazepam (RESTORIL) 30 mg capsule   No No   Sig: TAKE 1 CAPSULE BY MOUTH EVERY DAY AT BEDTIME   Patient not taking: Reported on 9/11/2018   traMADol (ULTRAM) 50 mg tablet   No No   Sig: TAKE 2 TABLETS(100 MG) BY MOUTH EVERY 8 HOURS AS NEEDED FOR MODERATE PAIN triamcinolone (KENALOG) 0 1 % cream   Yes No   Sig: Apply topically Twice daily   zolpidem (AMBIEN) 10 mg tablet   No No   Sig: Take 1 tablet (10 mg total) by mouth daily at bedtime      Facility-Administered Medications: None       Past Medical History:   Diagnosis Date    Alcoholism ([place] [address] )     Cerebral artery aneurysm     Change in mental state     last assessed 5/18/15; resolved 10/27/15    Diabetes mellitus ([place] [address] )     Drug dependence ([place] [address] )     Fatigue     last assessed 1/26/15; resolved 5/24/16    Hepatitis C     Kidney disease     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[place] [address] )     Liver transplant recipient [facility])     Renal disorder     Subdural hygroma     2/27/14; resolved 7/28/15    Thrombocytopenia ([place] [address] ) 9/20/2017       Past Surgical History:   Procedure Laterality Date    BRAIN SURGERY  02/12/2014    left frontotemporal 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    CATARACT EXTRACTION      CHOLECYSTECTOMY      LIVER TRANSPLANTATION      ROTATOR CUFF REPAIR      SHOULDER SURGERY         Family History   Problem Relation Age of Onset    Hypertension Mother         benign essential     Lung cancer Father     Diabetes Sister     Cancer Brother     Stroke Other         cva - due to embolism of cerebra artery      I have reviewed and agree with the history as documented  Social History   Substance Use Topics    Smoking status: Never Smoker    Smokeless tobacco: Never Used      Comment: former smoker per allscripts     Alcohol use No        Review of Systems   Unable to perform ROS: Mental status change   Gastrointestinal: Positive for vomiting  Neurological: Positive for headaches  Psychiatric/Behavioral: Positive for confusion         Physical Exam  ED Triage Vitals   Temperature Pulse Respirations Blood Pressure SpO2   12/11/18 1204 12/11/18 1204 12/11/18 1204 12/11/18 1206 12/11/18 1204   98 4 °F (36 9 °C) 91 20 131/71 100 %      Temp Source Heart Rate Source Patient Position - Orthostatic VS BP Location FiO2 (%)   12/11/18 1204 12/11/18 1204 12/11/18 1204 12/11/18 1204 --   Oral Monitor Sitting Left arm       Pain Score       12/11/18 1204       5           Orthostatic Vital Signs  Vitals:    12/11/18 1341 12/11/18 1545 12/11/18 1902 12/11/18 2100   BP: 147/93 168/75 (!) 167/123 139/69   Pulse: 85 84 89 90   Patient Position - Orthostatic VS:    Lying       Physical Exam   Constitutional: Vital signs are normal  He appears well-developed and well-nourished  He is uncooperative  No distress  HENT:   Head: Normocephalic and atraumatic  Head is without raccoon's eyes and without Johnson's sign  Right Ear: Hearing, tympanic membrane and external ear normal  No hemotympanum  Left Ear: Hearing, tympanic membrane and external ear normal  No hemotympanum  Nose: Nose normal  No septal deviation  Mouth/Throat: Mucous membranes are dry  Eyes: Pupils are equal, round, and reactive to light  Conjunctivae, EOM and lids are normal  No scleral icterus  Neck: Normal range of motion  Neck supple  No JVD present  No neck rigidity  Cardiovascular: Normal rate, regular rhythm, normal heart sounds and intact distal pulses  No murmur heard  Pulmonary/Chest: Effort normal and breath sounds normal  No respiratory distress  Abdominal: Soft  Bowel sounds are normal  He exhibits no distension  There is generalized tenderness  Midline vertical scar, transverse scar extending from RUQ to LUQ  Genitourinary: Rectum normal    Musculoskeletal: Normal range of motion  He exhibits no edema or deformity  Neurological: He is alert  He has normal strength  He is disoriented  He displays no tremor  No cranial nerve deficit  He exhibits normal muscle tone  GCS eye subscore is 3  GCS verbal subscore is 3  GCS motor subscore is 5     Unable to perform full neurological examination due to patient's altered mental status  Patient is moving all four extremities, no rigidity or clonus on exam    disoriented to person, place, time   Skin: Skin is warm and dry  Capillary refill takes less than 2 seconds  He is not diaphoretic  Psychiatric: He has a normal mood and affect  His speech is slurred  He is agitated  Cognition and memory are impaired         ED Medications  Medications   fentanyl citrate (PF) 100 MCG/2ML 100 mcg (0 mcg Intravenous Hold 12/11/18 1826)   acyclovir (ZOVIRAX) 600 mg in sodium chloride 0 9 % 100 mL IVPB (600 mg Intravenous New Bag 12/11/18 2233)   vancomycin (VANCOCIN) 1,250 mg in sodium chloride 0 9 % 250 mL IVPB (1,250 mg Intravenous New Bag 12/11/18 2232)   clopidogrel (PLAVIX) tablet 75 mg (not administered)   insulin glargine (LANTUS) subcutaneous injection 24 Units 0 24 mL (not administered)   tacrolimus (PROGRAF) capsule 1 mg (not administered)   heparin (porcine) subcutaneous injection 5,000 Units (5,000 Units Subcutaneous Not Given 12/11/18 2222)   cefTRIAXone (ROCEPHIN) 2,000 mg in dextrose 5 % 50 mL IVPB (not administered)   ampicillin (OMNIPEN) 2,000 mg in sodium chloride 0 9 % 100 mL IVPB (not administered)   vancomycin (VANCOCIN) IVPB (premix) 1,000 mg (not administered)   acyclovir (ZOVIRAX) 600 mg in sodium chloride 0 9 % 100 mL IVPB (not administered)   OLANZapine (ZyPREXA) IM injection 10 mg (not administered)   sodium chloride 0 9 % infusion (100 mL/hr Intravenous New Bag 12/11/18 2231)   sodium chloride 0 9 % bolus 1,000 mL (0 mL Intravenous Stopped 12/11/18 1850)   fentanyl citrate (PF) 100 MCG/2ML 50 mcg (50 mcg Intravenous Given 12/11/18 1436)   lidocaine (PF) (XYLOCAINE-MPF) 1 % injection 10 mL (10 mL Infiltration Given 12/11/18 1642)   OLANZapine (ZyPREXA) IM injection 10 mg ( Intramuscular Not Given 12/11/18 1828)   sterile water injection **ADS Override Pull** (  Given 12/11/18 1658)   sterile water injection **ADS Override Pull** (2 1 mL  Given 12/11/18 1800)   OLANZapine (ZyPREXA) IM injection 10 mg (10 mg Intramuscular Given 12/11/18 1800) cefTRIAXone (ROCEPHIN) 2,000 mg in dextrose 5 % 50 mL IVPB (0 mg Intravenous Stopped 12/11/18 2006)   sodium chloride 0 9 % bolus 1,000 mL (0 mL Intravenous Stopped 12/11/18 2222)   ampicillin (OMNIPEN) 2,000 mg in sodium chloride 0 9 % 100 mL IVPB (0 mg Intravenous Stopped 12/11/18 2045)       Diagnostic Studies  Results Reviewed     Procedure Component Value Units Date/Time    Protime-INR [[id number]]  (Abnormal) Collected:  12/11/18 2046    Lab Status:  Final result Specimen:  Blood from Arm, Left Updated:  12/11/18 2110     Protime 15 2 (H) seconds      INR 1 19 (H)    Carboxyhemoglobin [[id number]]  (Abnormal) Collected:  12/11/18 1850    Lab Status:  Final result Specimen:  Blood from Arm, Right Updated:  12/11/18 1930     Carbon Monoxide, Blood 1 6 (H) %     Narrative: Therapeutic levels (1 mg/mL and 2 mg/mL) of hydroxocobalamin may interfere with the fCOHb and fMetHb where it may cause lower than expected values  Normal Carboxyhemoglobin range for nonsmokers is <1 5%   Normal Carboxyhemoglobin range for smokers is 1 5% to 5 1%     Lactic acid, plasma [[id number]]  (Abnormal) Collected:  12/11/18 1834    Lab Status:  Final result Specimen:  Blood from Vein Updated:  12/11/18 1921     LACTIC ACID 2 8 (HH) mmol/L     Narrative:         Result may be elevated if tourniquet was used during collection  Rapid drug screen, urine [[id number]]  (Normal) Collected:  12/11/18 1556    Lab Status:  Final result Specimen:  Urine from Urine, Clean Catch Updated:  12/11/18 1834     Amph/Meth UR Negative     Barbiturate Ur Negative     Benzodiazepine Urine Negative     Cocaine Urine Negative     Methadone Urine Negative     Opiate Urine Negative     PCP Ur Negative     THC Urine Negative    Narrative:         FOR MEDICAL PURPOSES ONLY  IF CONFIRMATION NEEDED PLEASE CONTACT THE LAB WITHIN 5 DAYS      Drug Screen Cutoff Levels:  AMPHETAMINE/METHAMPHETAMINES  1000 ng/mL  BARBITURATES     200 ng/mL  BENZODIAZEPINES     200 ng/mL  COCAINE      300 ng/mL  METHADONE      300 ng/mL  OPIATES      300 ng/mL  PHENCYCLIDINE     25 ng/mL  THC       50 ng/mL    Salicylate level [[id number]]  (Abnormal) Collected:  12/11/18 1723    Lab Status:  Final result Specimen:  Blood from Arm, Right Updated:  93/38/55 7324     Salicylate Lvl <3 (L) mg/dL     Acetaminophen level [[id number]]  (Abnormal) Collected:  12/11/18 1723    Lab Status:  Final result Specimen:  Blood from Arm, Right Updated:  12/11/18 1753     Acetaminophen Level <2 (L) ug/mL     Ethanol [[id number]]  (Normal) Collected:  12/11/18 1723    Lab Status:  Final result Specimen:  Blood from Arm, Right Updated:  12/11/18 1751     Ethanol Lvl <3 mg/dL     Urine Microscopic [[id number]]  (Abnormal) Collected:  12/11/18 1605    Lab Status:  Final result Specimen:  Urine from Urine, Clean Catch Updated:  12/11/18 1658     RBC, UA 2-4 (A) /hpf      WBC, UA None Seen /hpf      Epithelial Cells None Seen /hpf      Bacteria, UA None Seen /hpf      Hyaline Casts, UA None Seen /lpf     POCT urinalysis dipstick [[id number]]  (Normal) Resulted:  12/11/18 1633    Lab Status:  Final result Updated:  12/11/18 1633     Color, UA see results    CSF, RBC count (Tube 1) [[id number]]     Lab Status:  No result Specimen:  Cerebrospinal Fluid from Lumbar Puncture     CSF, Glucose (Tube 2) [[id number]]     Lab Status:  No result Specimen:  Cerebrospinal Fluid from Lumbar Puncture     CSF, Total Protein (Tube 2) [[id number]]     Lab Status:  No result Specimen:  Cerebrospinal Fluid from Lumbar Puncture     CSF, Gram Stain (Tube 3) [[id number]]     Lab Status:  No result Specimen:  Cerebrospinal Fluid from Lumbar Puncture     CSF, Culture and Gram stain (Tube 3) [[id number]]     Lab Status:  No result Specimen:  Cerebrospinal Fluid from Lumbar Puncture     CSF, White cell count with differential (Tube 4) [[id number]]     Lab Status:  No result Specimen:  Cerebrospinal Fluid from Lumbar Puncture     CSF, RBC count (Tube 4) [[id number]]     Lab Status:  No result Specimen:  Cerebrospinal Fluid from Lumbar Puncture     CSF, Comprehensive PCR [[id number]]     Lab Status:  No result Specimen:  Cerebrospinal Fluid from Lumbar Puncture     ED Urine Macroscopic [[id number]]  (Abnormal) Collected:  12/11/18 1605    Lab Status:  Final result Specimen:  Urine Updated:  12/11/18 1604     Color, UA Yellow     Clarity, UA Clear     pH, UA 8 5 (H)     Leukocytes, UA Negative     Nitrite, UA Negative     Protein,  (2+) (A) mg/dl      Glucose,  (1/2%) (A) mg/dl      Ketones, UA Negative mg/dl      Urobilinogen, UA 0 2 E U /dl      Bilirubin, UA Negative     Blood, UA Trace (A)     Specific [place], UA 1 020    Narrative:       CLINITEK RESULT    Fingerstick Glucose (POCT) [[id number]]  (Abnormal) Collected:  12/11/18 1532    Lab Status:  Final result Updated:  12/11/18 1533     POC Glucose 300 (H) mg/dl     Lactic acid, plasma [[id number]]  (Abnormal) Collected:  12/11/18 1413    Lab Status:  Final result Specimen:  Blood from Line, Venous Updated:  12/11/18 1501     LACTIC ACID 5 4 (HH) mmol/L     Narrative:         Result may be elevated if tourniquet was used during collection      Comprehensive metabolic panel [[id number]]  (Abnormal) Collected:  12/11/18 1413    Lab Status:  Final result Specimen:  Blood from Line, Venous Updated:  12/11/18 1458     Sodium 126 (L) mmol/L      Potassium 4 3 mmol/L      Chloride 94 (L) mmol/L      CO2 22 mmol/L      ANION GAP 10 mmol/L      BUN 24 mg/dL      Creatinine 2 00 (H) mg/dL      Glucose 321 (H) mg/dL      Calcium 9 8 mg/dL      AST 34 U/L      ALT 29 U/L      Alkaline Phosphatase 155 (H) U/L      Total Protein 8 6 (H) g/dL      Albumin 3 6 g/dL      Total Bilirubin 0 52 mg/dL      eGFR 33 ml/min/1 73sq m     Narrative:         National Kidney Disease Education Program recommendations are as follows:  GFR calculation is accurate only with a steady state creatinine  Chronic Kidney disease less than 60 ml/min/1 73 sq  meters  Kidney failure less than 15 ml/min/1 73 sq  meters  TSH [[id number]]  (Normal) Collected:  12/11/18 1413    Lab Status:  Final result Specimen:  Blood from Line, Venous Updated:  12/11/18 1458     TSH 3RD GENERATON 1 990 uIU/mL     Narrative:         Patients undergoing fluorescein dye angiography may retain small amounts of fluorescein in the body for 48-72 hours post procedure  Samples containing fluorescein can produce falsely depressed TSH values  If the patient had this procedure,a specimen should be resubmitted post fluorescein clearance      Lipase [[id number]]  (Normal) Collected:  12/11/18 1413    Lab Status:  Final result Specimen:  Blood from Line, Venous Updated:  12/11/18 1458     Lipase 97 u/L     Troponin I [[id number]]  (Normal) Collected:  12/11/18 1413    Lab Status:  Final result Specimen:  Blood from Line, Venous Updated:  12/11/18 1455     Troponin I <0 02 ng/mL     Ammonia [[id number]]  (Normal) Collected:  12/11/18 1413    Lab Status:  Final result Specimen:  Blood from Line, Venous Updated:  12/11/18 1443     Ammonia 15 umol/L     CBC and differential [[id number]]  (Abnormal) Collected:  12/11/18 1413    Lab Status:  Final result Specimen:  Blood from Line, Venous Updated:  12/11/18 1434     WBC 10 44 (H) Thousand/uL      RBC 5 04 Million/uL      Hemoglobin 12 2 g/dL      Hematocrit 38 9 %      MCV 77 (L) fL      MCH 24 2 (L) pg      MCHC 31 4 g/dL      RDW 14 3 %      MPV 13 3 (H) fL      Platelets 027 (L) Thousands/uL      nRBC 0 /100 WBCs      Neutrophils Relative 80 (H) %      Immat GRANS % 0 %      Lymphocytes Relative 14 %      Monocytes Relative 6 %      Eosinophils Relative 0 %      Basophils Relative 0 %      Neutrophils Absolute 8 36 (H) Thousands/µL      Immature Grans Absolute 0 04 Thousand/uL      Lymphocytes Absolute 1 43 Thousands/µL      Monocytes Absolute 0 57 Thousand/µL      Eosinophils Absolute 0 01 Thousand/µL      Basophils Absolute 0 03 Thousands/µL     Blood culture - Set 2 [[id number]] Collected:  12/11/18 1414    Lab Status: In process Specimen:  Blood from Line, Venous Updated:  12/11/18 1425    Blood culture - Set 1 [[id number]] Collected:  12/11/18 1414    Lab Status:  No result Specimen:  Blood from Line, Venous     Fingerstick Glucose (POCT) [[id number]]  (Abnormal) Collected:  12/11/18 1206    Lab Status:  Final result Updated:  12/11/18 1207     POC Glucose 407 (H) mg/dl                  XR chest 1 view portable   Final Result by Jonny Sellers MD (12/11 2108)      No acute cardiopulmonary disease  No pneumothorax  Workstation performed: [id number]         XR chest portable - 1 view   Final Result by Augustus Douglas MD (12/11 1727)      No acute cardiopulmonary disease  Workstation performed: [id number]         CT abdomen pelvis wo contrast   Final Result by Jonny Sellers MD (12/11 1500)      No acute intra-abdominal abnormality  No free air or free fluid  The study was mildly limited by patient motion as the patient was unable to cooperate with the exam                      Workstation performed: [id number]         CT head without contrast   Final Result by Jonny Sellers MD (12/11 1454)      No acute intracranial abnormality  Mild to moderate chronic small vessel ischemic changes  No significant interval change when compared to a CT brain dated September 19, 2017  Workstation performed: [id number]         IR consult    (Results Pending)   MRI inpatient order    (Results Pending)   IR consult    (Results Pending)         Procedures  Procedures      Phone Consults  ED Phone Contact    ED Course  ED Course as of Dec 11 2238   Tue Dec 11, 2018   1440 Platelet Count: (!) 008   1519 Creatinine: (!) 2 00   1519 Chloride: (!) 94   1519 LACTIC ACID: (!!) 5 4   1647 Patient's left AC IV extravasation, line pulled   Will attempt line in right IJ 1658 Olanzapine for agitation  Soft restraints, patient attempts to get out of bed and is a fall risk    paged SLIM, admitting would like CC to be made aware of patient  Critical care agrees patient stable for non-ICU admission  Will admit to level 2 stepdown, admit to SLIM  Identification of Seniors at Risk      Most Recent Value   (ISAR) Identification of Seniors at Risk   Before the illness or injury that brought you to the Emergency, did you need someone to help you on a regular basis? 0 Filed at: 12/11/2018 1206   In the last 24 hours, have you needed more help than usual?  0 Filed at: 12/11/2018 1206   Have you been hospitalized for one or more nights during the past 6 months? 0 Filed at: 12/11/2018 1206   In general, do you see well?  0 Filed at: 12/11/2018 1206   In general, do you have serious problems with your memory? 0 Filed at: 12/11/2018 1206   Do you take more than three different medications every day? 1 Filed at: 12/11/2018 1206   ISAR Score  1 Filed at: 12/11/2018 1206                          MDM  Number of Diagnoses or Management Options  Acute encephalopathy: new and requires workup  Dehydration: new and requires workup  Hyponatremia:   Lactate blood increase: new and requires workup  Diagnosis management comments: 1  Patient with acute AMS following acute onset headache associated with nausea and vomiting  On exam patient speaking occasional words in [place], but unintelligible phrases the majority of the time, slurring his speech, unable to follow commands, disoriented x4  Blood glucose 300  2  ddx includes electrolyte abnormality, metabolic including hepatic encephalopathy, intracranial pathology, hyper/hypothyroidism, toxic ingestion, infection including meningitis/encephalitis, CO toxicity, cardiac  3  Coma panel wnl  Carboxyhemoglobin, as patient and wife c/o HA, wnl  No anion gap, bicarb wnl  Ammonia wnl   Lactate elevated at >5  CT head no acute processes, CT abd/pelvis no acute processes  CXR no acute changes  Urine no infection  Acute hyponatremia, corrected sodium 128-129, likely secondary to acute vomiting, dehydration  4  Difficult to obtain venous access on patient due to agitation and dehydration, even with soft wrist restraints and 20mg Xyprexa IM  5  Patient on Plavix, hx thrombocytopenia  Platelets >291 today  Patient is poor candidate fvaor procedural sedation, and given he is on Plavix, and given his current altered mental status with agitation, risk of traumatic LP is higher  Patient is immunocompromised given meds s/p transplant, will treat empirically for meningitis, vancomycin, rocephin, ampicillin, antiviral with acyclovir  Admit, patient will need LP under IR guidance with sedation  6  NS for rehydration  7  Patient admitted to level 2 stepdown, SLIM          Amount and/or Complexity of Data Reviewed  Clinical lab tests: ordered and reviewed  Tests in the radiology section of CPT®: ordered and reviewed  Decide to obtain previous medical records or to obtain history from someone other than the patient: yes  Obtain history from someone other than the patient: yes  Review and summarize past medical records: yes  Independent visualization of images, tracings, or specimens: yes      CritCare Time    Disposition  Final diagnoses:   Lactate blood increase   Acute encephalopathy   Dehydration   Hyponatremia     Time reflects when diagnosis was documented in both MDM as applicable and the Disposition within this note     Time User Action Codes Description Comment    12/11/2018  6:51 PM Andrei Brady Add [R79 89] Lactate blood increase     12/11/2018  6:51 PM Andrei Brady Add [G93 40] Acute encephalopathy     12/11/2018  6:52 PM Andrei Brady Add [E86 0] Dehydration     12/11/2018  6:52 PM Gigi Llanos [R79 89] Lactate blood increase     12/11/2018  6:52 PM Gigi Llanos [G93 40] Acute encephalopathy     12/11/2018 10:36 PM Andrei Brady Add [E87 1] Hyponatremia       ED Disposition     ED Disposition Condition Comment    Admit  Case was discussed with NILTON and the patient's admission status was agreed to be Admission Status: inpatient status to the service of Dr Criss Hernandez   Follow-up Information    None         Current Discharge Medication List      CONTINUE these medications which have NOT CHANGED    Details   aspirin 81 mg chewable tablet Chew 1 tablet daily  Qty: 30 tablet, Refills: 0      atorvastatin (LIPITOR) 40 mg tablet Take 1 tablet by mouth every evening  Qty: 30 tablet, Refills: 0      GREER CONTOUR TEST test strip TEST THREE TIMES DAILY  Qty: 300 each, Refills: 5    Associated Diagnoses: Controlled type 2 diabetes mellitus without complication, with long-term current use of insulin (HCC)      Cetirizine HCl (ZYRTEC ALLERGY) 10 MG CAPS Take 1 tablet by mouth daily as needed      clopidogrel (PLAVIX) 75 mg tablet Take 1 tablet by mouth daily  Qty: 30 tablet, Refills: 0      INSULIN SYRINGE 1CC/29G 29G X 1/2" 1 ML MISC by Does not apply route      Lancets MISC by Does not apply route      LANTUS 100 UNIT/ML subcutaneous injection INJECT 24 UNITS UNDER THE SKIN EVERY NIGHT AT BEDTIME  Qty: 10 mL, Refills: 5    Associated Diagnoses: 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 LYRICA 50 MG capsule TAKE ONE CAPSULE BY MOUTH THREE TIMES DAILY  Qty: 90 capsule, Refills: 5    Associated Diagnoses: Neuropathy      nystatin (MYCOSTATIN) cream APPLY TOPICALLLY ON RASH AS NEEDED  Qty: 30 g, Refills: 0    Associated Diagnoses: Candidiasis of skin      nystatin-triamcinolone (MYCOLOG-II) cream Apply topically      ! ! pregabalin (LYRICA) 25 mg capsule Take 25 mg by mouth 2 (two) times a day      rosuvastatin (CRESTOR) 10 MG tablet Take 1 tablet (10 mg total) by mouth daily at bedtime  Qty: 90 tablet, Refills: 3    Associated Diagnoses: Mixed hyperlipidemia      tacrolimus (PROGRAF) 1 mg capsule Take 1 capsule (1 mg total) by mouth every 12 (twelve) hours  Qty: 180 capsule, Refills: 3    Associated Diagnoses: Liver transplanted (HCC)      temazepam (RESTORIL) 30 mg capsule TAKE 1 CAPSULE BY MOUTH EVERY DAY AT BEDTIME  Qty: 30 capsule, Refills: 3    Associated Diagnoses: Insomnia, unspecified type      traMADol (ULTRAM) 50 mg tablet TAKE 2 TABLETS(100 MG) BY MOUTH EVERY 8 HOURS AS NEEDED FOR MODERATE PAIN  Qty: 180 tablet, Refills: 0    Associated Diagnoses: 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 - Potential duplicate medications found  Please discuss with provider  No discharge procedures on file  ED Provider  Attending physically available and evaluated Reynaldo Cannon  OLESYA managed the patient along with the ED Attending      Electronically Signed by         Roma Barbour DO  12/11/18 7276

## 2018-12-11 NOTE — ED NOTES
Additional 5mg of Zyprexa given at this time for a total of 10mg  Given in pt's R deltoid        Andreina Villavicencio RN  12/11/18 0351

## 2018-12-11 NOTE — ED NOTES
10mg Zyprexa given at this time in the L deltoid per verbal order from Dr Sherry Jimenez, RN  12/11/18 8516

## 2018-12-11 NOTE — ED NOTES
Dr Cole Cr at bedside attempting to obtain US guided IV access at this time        Kristel Bardales RN  12/11/18 8104

## 2018-12-11 NOTE — ED NOTES
Dr Wen Navas at bedside attempting [address] guided IV access at this time        Angela Arauz RN  12/11/18 3595

## 2018-12-11 NOTE — ED NOTES
Dr Rolando Wyatt and Dr Gretel Manzo at bedside with [address] to obtain IV access       Ellen Steel RN  12/11/18 0101

## 2018-12-12 ENCOUNTER — APPOINTMENT (OUTPATIENT)
Dept: RADIOLOGY | Facility: HOSPITAL | Age: 68
DRG: 064 | End: 2018-12-12
Payer: MEDICARE

## 2018-12-12 LAB
ALBUMIN SERPL BCP-MCNC: 3.2 G/DL (ref 3.5–5)
ALP SERPL-CCNC: 122 U/L (ref 46–116)
ALT SERPL W P-5'-P-CCNC: 27 U/L (ref 12–78)
AMMONIA PLAS-SCNC: 11 UMOL/L (ref 11–35)
ANION GAP SERPL CALCULATED.3IONS-SCNC: 9 MMOL/L (ref 4–13)
AST SERPL W P-5'-P-CCNC: 42 U/L (ref 5–45)
BASOPHILS # BLD AUTO: 0.02 THOUSANDS/ΜL (ref 0–0.1)
BASOPHILS NFR BLD AUTO: 0 % (ref 0–1)
BILIRUB SERPL-MCNC: 0.48 MG/DL (ref 0.2–1)
BUN SERPL-MCNC: 22 MG/DL (ref 5–25)
CALCIUM SERPL-MCNC: 7.7 MG/DL (ref 8.3–10.1)
CHLORIDE SERPL-SCNC: 103 MMOL/L (ref 100–108)
CO2 SERPL-SCNC: 25 MMOL/L (ref 21–32)
CREAT SERPL-MCNC: 1.51 MG/DL (ref 0.6–1.3)
EOSINOPHIL # BLD AUTO: 0.01 THOUSAND/ΜL (ref 0–0.61)
EOSINOPHIL NFR BLD AUTO: 0 % (ref 0–6)
ERYTHROCYTE [DISTWIDTH] IN BLOOD BY AUTOMATED COUNT: 14.4 % (ref 11.6–15.1)
FLUAV AG SPEC QL: NORMAL
FLUBV AG SPEC QL: NORMAL
GFR SERPL CREATININE-BSD FRML MDRD: 46 ML/MIN/1.73SQ M
GLUCOSE SERPL-MCNC: 130 MG/DL (ref 65–140)
GLUCOSE SERPL-MCNC: 132 MG/DL (ref 65–140)
GLUCOSE SERPL-MCNC: 63 MG/DL (ref 65–140)
GLUCOSE SERPL-MCNC: 64 MG/DL (ref 65–140)
GLUCOSE SERPL-MCNC: 67 MG/DL (ref 65–140)
GLUCOSE SERPL-MCNC: 71 MG/DL (ref 65–140)
HCT VFR BLD AUTO: 33.3 % (ref 36.5–49.3)
HGB BLD-MCNC: 10.5 G/DL (ref 12–17)
IMM GRANULOCYTES # BLD AUTO: 0.04 THOUSAND/UL (ref 0–0.2)
IMM GRANULOCYTES NFR BLD AUTO: 0 % (ref 0–2)
LYMPHOCYTES # BLD AUTO: 1.84 THOUSANDS/ΜL (ref 0.6–4.47)
LYMPHOCYTES NFR BLD AUTO: 14 % (ref 14–44)
MCH RBC QN AUTO: 24.3 PG (ref 26.8–34.3)
MCHC RBC AUTO-ENTMCNC: 31.5 G/DL (ref 31.4–37.4)
MCV RBC AUTO: 77 FL (ref 82–98)
MONOCYTES # BLD AUTO: 1.19 THOUSAND/ΜL (ref 0.17–1.22)
MONOCYTES NFR BLD AUTO: 9 % (ref 4–12)
NEUTROPHILS # BLD AUTO: 10.28 THOUSANDS/ΜL (ref 1.85–7.62)
NEUTS SEG NFR BLD AUTO: 77 % (ref 43–75)
NRBC BLD AUTO-RTO: 0 /100 WBCS
PLATELET # BLD AUTO: 113 THOUSANDS/UL (ref 149–390)
PMV BLD AUTO: 13.1 FL (ref 8.9–12.7)
POTASSIUM SERPL-SCNC: 3.9 MMOL/L (ref 3.5–5.3)
PROT SERPL-MCNC: 7.6 G/DL (ref 6.4–8.2)
RBC # BLD AUTO: 4.32 MILLION/UL (ref 3.88–5.62)
RSV B RNA SPEC QL NAA+PROBE: NORMAL
SODIUM SERPL-SCNC: 137 MMOL/L (ref 136–145)
VANCOMYCIN SERPL-MCNC: 16 UG/ML
WBC # BLD AUTO: 13.38 THOUSAND/UL (ref 4.31–10.16)

## 2018-12-12 PROCEDURE — 80053 COMPREHEN METABOLIC PANEL: CPT | Performed by: INTERNAL MEDICINE

## 2018-12-12 PROCEDURE — 80202 ASSAY OF VANCOMYCIN: CPT | Performed by: INTERNAL MEDICINE

## 2018-12-12 PROCEDURE — 82948 REAGENT STRIP/BLOOD GLUCOSE: CPT

## 2018-12-12 PROCEDURE — 97163 PT EVAL HIGH COMPLEX 45 MIN: CPT

## 2018-12-12 PROCEDURE — 87040 BLOOD CULTURE FOR BACTERIA: CPT | Performed by: INTERNAL MEDICINE

## 2018-12-12 PROCEDURE — 99232 SBSQ HOSP IP/OBS MODERATE 35: CPT | Performed by: INTERNAL MEDICINE

## 2018-12-12 PROCEDURE — 85025 COMPLETE CBC W/AUTO DIFF WBC: CPT | Performed by: INTERNAL MEDICINE

## 2018-12-12 PROCEDURE — G8988 SELF CARE GOAL STATUS: HCPCS

## 2018-12-12 PROCEDURE — 99222 1ST HOSP IP/OBS MODERATE 55: CPT | Performed by: PSYCHIATRY & NEUROLOGY

## 2018-12-12 PROCEDURE — G8979 MOBILITY GOAL STATUS: HCPCS

## 2018-12-12 PROCEDURE — 87449 NOS EACH ORGANISM AG IA: CPT | Performed by: INTERNAL MEDICINE

## 2018-12-12 PROCEDURE — 82140 ASSAY OF AMMONIA: CPT | Performed by: INTERNAL MEDICINE

## 2018-12-12 PROCEDURE — 87631 RESP VIRUS 3-5 TARGETS: CPT | Performed by: INTERNAL MEDICINE

## 2018-12-12 PROCEDURE — G8978 MOBILITY CURRENT STATUS: HCPCS

## 2018-12-12 PROCEDURE — G8987 SELF CARE CURRENT STATUS: HCPCS

## 2018-12-12 PROCEDURE — 80197 ASSAY OF TACROLIMUS: CPT | Performed by: PHYSICIAN ASSISTANT

## 2018-12-12 PROCEDURE — 99223 1ST HOSP IP/OBS HIGH 75: CPT | Performed by: INTERNAL MEDICINE

## 2018-12-12 PROCEDURE — 97167 OT EVAL HIGH COMPLEX 60 MIN: CPT

## 2018-12-12 RX ORDER — INSULIN GLARGINE 100 [IU]/ML
12 INJECTION, SOLUTION SUBCUTANEOUS
Status: DISCONTINUED | OUTPATIENT
Start: 2018-12-12 | End: 2018-12-13

## 2018-12-12 RX ORDER — LORAZEPAM 2 MG/ML
0.5 INJECTION INTRAMUSCULAR ONCE
Status: COMPLETED | OUTPATIENT
Start: 2018-12-12 | End: 2018-12-12

## 2018-12-12 RX ORDER — ACETAMINOPHEN 325 MG/1
650 TABLET ORAL EVERY 6 HOURS PRN
Status: DISCONTINUED | OUTPATIENT
Start: 2018-12-12 | End: 2018-12-18 | Stop reason: HOSPADM

## 2018-12-12 RX ORDER — DEXTROSE MONOHYDRATE 25 G/50ML
25 INJECTION, SOLUTION INTRAVENOUS ONCE
Status: COMPLETED | OUTPATIENT
Start: 2018-12-12 | End: 2018-12-12

## 2018-12-12 RX ADMIN — AMPICILLIN SODIUM 2000 MG: 2 INJECTION, POWDER, FOR SOLUTION INTRAMUSCULAR; INTRAVENOUS at 20:52

## 2018-12-12 RX ADMIN — INSULIN GLARGINE 24 UNITS: 100 INJECTION, SOLUTION SUBCUTANEOUS at 00:45

## 2018-12-12 RX ADMIN — VANCOMYCIN HYDROCHLORIDE 1250 MG: 10 INJECTION, POWDER, LYOPHILIZED, FOR SOLUTION INTRAVENOUS at 22:01

## 2018-12-12 RX ADMIN — CEFTRIAXONE SODIUM 2000 MG: 10 INJECTION, POWDER, FOR SOLUTION INTRAVENOUS at 23:34

## 2018-12-12 RX ADMIN — AMPICILLIN SODIUM 2000 MG: 2 INJECTION, POWDER, FOR SOLUTION INTRAMUSCULAR; INTRAVENOUS at 14:07

## 2018-12-12 RX ADMIN — SODIUM CHLORIDE 100 ML/HR: 9 INJECTION, SOLUTION INTRAVENOUS at 08:47

## 2018-12-12 RX ADMIN — TACROLIMUS 1 MG: 1 CAPSULE, GELATIN COATED ORAL at 20:51

## 2018-12-12 RX ADMIN — ACYCLOVIR SODIUM 600 MG: 50 INJECTION, SOLUTION INTRAVENOUS at 05:40

## 2018-12-12 RX ADMIN — LORAZEPAM 0.5 MG: 2 INJECTION INTRAMUSCULAR; INTRAVENOUS at 02:27

## 2018-12-12 RX ADMIN — HEPARIN SODIUM 5000 UNITS: 5000 INJECTION INTRAVENOUS; SUBCUTANEOUS at 22:01

## 2018-12-12 RX ADMIN — DEXTROSE 50 % IN WATER (D50W) INTRAVENOUS SYRINGE 25 ML: at 06:29

## 2018-12-12 RX ADMIN — CEFTRIAXONE SODIUM 2000 MG: 10 INJECTION, POWDER, FOR SOLUTION INTRAVENOUS at 12:21

## 2018-12-12 RX ADMIN — HEPARIN SODIUM 5000 UNITS: 5000 INJECTION INTRAVENOUS; SUBCUTANEOUS at 14:07

## 2018-12-12 RX ADMIN — TACROLIMUS 1 MG: 1 CAPSULE, GELATIN COATED ORAL at 08:47

## 2018-12-12 RX ADMIN — AMPICILLIN SODIUM 2000 MG: 2 INJECTION, POWDER, FOR SOLUTION INTRAMUSCULAR; INTRAVENOUS at 01:40

## 2018-12-12 RX ADMIN — INSULIN GLARGINE 12 UNITS: 100 INJECTION, SOLUTION SUBCUTANEOUS at 22:02

## 2018-12-12 RX ADMIN — ACETAMINOPHEN 650 MG: 325 TABLET, FILM COATED ORAL at 20:52

## 2018-12-12 RX ADMIN — ACYCLOVIR SODIUM 600 MG: 50 INJECTION, SOLUTION INTRAVENOUS at 16:59

## 2018-12-12 RX ADMIN — AMPICILLIN SODIUM 2000 MG: 2 INJECTION, POWDER, FOR SOLUTION INTRAMUSCULAR; INTRAVENOUS at 08:51

## 2018-12-12 NOTE — CONSULTS
NEUROLOGY Consult    Service: Neurology  Attending: Dr Maru Smith MD    Patient Information: Idania Cummins 77 y o  male MRN: [id number]  Unit/Bed#: [facility] 807-01 Encounter: [id number]  PCP: Barbra Joshua DO  Date of Admission:  12/11/2018  Date of Consultation: 12/12/18  Requesting Physician: Robinson Pagan MD    Reason For Consultation:   Change in Mental Status / Encephalopathy    Assessment/Plan:    Hospital Problem List:     Principal Problem:    Encephalopathy acute  Active Problems:    Liver replaced by transplant ([facility] )    CKD (chronic kidney disease) stage 3, GFR 30-59 ml/min ([facility])    Personal history of transient ischemic attack (TIA), and cerebral infarction without residual deficits    Sepsis ([facility] )    Acute renal failure superimposed on stage 3 chronic kidney disease ([facility] )    Hyponatremia    Change in mental status/encephalopathy likely infectious in etiology with fevers and mild leukocytosis on immunosuppressed state secondary to medication induced in setting of liver transplantation orthotopic, CARLOTTA on chronic kidney disease stage 3 likely prerenal azotemia which has improved, thrombocytopenia  Plan:  1  Encephalopathy acute- Strong consideration for infectious etiology / septic encephalopathy in the setting of his immunocompromised state and fevers  Metabolic causes less likely with normal ammonia and normal electrolytes and improved renal function at baseline without overt azotemia  I do not think this is hepatic encephalopathy with normal ammonia, LFTs and INR  Spontaneous bacterial peritonitis is less likely as well with normal abdominal exam without overt ascites however would continue to monitor belly exam and for fevers  His wife denies new medications or substance abuse and his toxicology screen is negative  Currently covered with broad-spectrum antibiotics with bacterial meningitis/encephalitis    Would recommend continuing this vancomycin ceftriaxone ampicillin and viral coverage with acyclovir  Would continue to hold Plavix for need of lumbar puncture, IR consult has been placed  Plavix last dose on Monday 12/10/2018  Follow blood cultures and other microbiology fever curve and white blood cell count  Would also consider elevated tacrolimus level/ as etiology with small left resting tremor which I noted on exam   Please check trough level  to ensure at therapeutic level  Consider testing folate B12 TSH serum RPR for completeness  CT scan without obvious change or acute changes including hemorrhage or mass  Would continue with imaging with MRI as ordered to rule out any focal changes  May use Haldol or other antipsychotic as needed for hyperactive delirium or agitation when harm to others or self,    2  Fever  3  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  4  Orthotopic liver transplantation [facility] secondary to alcoholic and IV drug abuse hepatitis C  5  History of hepatitis-C status post antiviral therapy completed per wife  6  Acute kidney injury likely prerenal improved on chronic kidney disease stage 3 baseline creatinine approximately 1 5-1 6  6  Thrombocytopenia chronic    VTE Prophylaxis:  SCDs and acceptable for heparin subcutaneous would recommend holding prior to LP    Counseling / Coordination of Care Time: 1 hour  Greater than 50% of total time spent on patient counseling and coordination of care  Collaboration of Care: Were Recommendations Directly Discussed with Primary Treatment Team? 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, subdural hygroma which reportedly resolved 07/2015  He presents to the emergency room yesterday for evaluation of headache associated with altered mental status x1 day duration  History obtained via chart review and discussion with his wife Keyur Petty by phone with his permission  She explained to me this morning on phone that he arose yesterday morning approximately 3:00 a m  With the sudden severe headache  This was associated with nausea and he had 9 episodes of vomiting  He took 2 Motrin without relief she noticed he was not acting himself but was able to move all 4 extremities no slurred speech ambulating okay  She urged for hospital evaluation  She explains over the past week he has not complained of symptoms of fevers chills cough shortness of breath rashes or breakdown of skin nausea vomiting or diarrhea  There have been no medication changes no recent travel or sick contacts  He does not drink alcohol he does not currently do drugs he does not smoke  He has a history of IV drug abuse with hepatitis C causing cirrhosis with liver transplant in 2002 at [facility]  As well he was treated for hepatitis C about 4 years ago with antiviral therapy  He is maintained on tacrolimus b i d  Under the direction of [address] [place] [address]  He is compliant with these medications  His history history also includes brain aneurysm clipping at [address] [place] [address] about 2-3 years ago where he suffered a stroke at that time he has no residual symptoms  Is maintained on Plavix thereafter    Last dose on Monday 12/10/2018      Review of Systems:  Review of systems obtained bedside with Mr  Or teeth which was limited by his mental status, as well discussed over the phone with Eamon Emms his wife with his permission  Review of Systems   Constitutional: Positive for fever  Negative for chills and diaphoresis  Fever last night   HENT: Negative for drooling, hearing loss, nosebleeds, postnasal drip, rhinorrhea and sore throat  Eyes: Negative for photophobia and visual disturbance  Respiratory: Negative for cough, choking and shortness of breath  Cardiovascular: Negative for chest pain, palpitations and leg swelling  Gastrointestinal: Positive for nausea and vomiting  Negative for abdominal distention, abdominal pain and constipation  Endocrine: Negative for polydipsia, polyphagia and polyuria  Genitourinary: Negative for dysuria and hematuria  Musculoskeletal: Negative for arthralgias, back pain, myalgias, neck pain and neck stiffness  Skin: Negative for pallor, rash and wound  Allergic/Immunologic: Positive for immunocompromised state  Neurological: Positive for headaches  Negative for dizziness, tremors, seizures, speech difficulty, weakness and numbness  Hematological: Negative for adenopathy  Does not bruise/bleed easily  Psychiatric/Behavioral: Positive for agitation, behavioral problems and confusion  Negative for decreased concentration and hallucinations  All other systems reviewed and are negative        Past Medical and Surgical History:     Past Medical History:   Diagnosis Date    Alcoholism ([facility] )     Cerebral artery aneurysm     Change in mental state     last assessed 5/18/15; resolved 10/27/15    Diabetes mellitus ([facility] )     Drug dependence ([facility] )     Fatigue     last assessed 1/26/15; resolved 5/24/16    Hepatitis C     Kidney disease     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[facility] )     [facility])     Renal disorder     Subdural hygroma     2/27/14; resolved 7/28/15    Thrombocytopenia ([facility] ) 9/20/2017       Past Surgical History:   Procedure Laterality Date    BRAIN SURGERY  02/12/2014    left frontotemporal 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    CATARACT EXTRACTION      CHOLECYSTECTOMY      LIVER TRANSPLANTATION      ROTATOR CUFF REPAIR      SHOULDER SURGERY         Meds/Allergies:    all medications and allergies reviewed    Allergies: Allergies   Allergen Reactions    Ciprofloxacin     Iv Contrast [Iodinated Diagnostic Agents]     Levaquin [Levofloxacin]     Omnipaque [Iohexol]        Social History:     Marital Status: /Civil Union    Substance Use History:   History   Alcohol Use No     History   Smoking Status    Never Smoker   Smokeless Tobacco    Never Used     Comment: former smoker per [employer]      History   Drug Use No     Comment: remotely quit drug use per [employer]scripts        Family History:    Family History   Problem Relation Age of Onset    Hypertension Mother         benign essential     Lung cancer Father     Diabetes Sister     Cancer Brother     Stroke Other         cva - due to embolism of cerebra artery        Physical Exam:     Vitals:   Blood Pressure: 130/90 (12/12/18 0720)  Pulse: 85 (12/12/18 0300)  Temperature: 97 8 °F (36 6 °C) (Vitals done by 1:1) (12/12/18 0720)  Temp Source: Axillary (12/12/18 0720)  Respirations: 20 (12/12/18 0720)  Weight - Scale: 61 2 kg (135 lb) (12/11/18 2105)  SpO2: 98 % (12/12/18 0720)    Physical Exam   Constitutional: He appears well-developed and well-nourished  No distress  HENT:   Head: Normocephalic and atraumatic  Nose: Nose normal    Mouth/Throat: Oropharynx is clear and moist    Eyes: Pupils are equal, round, and reactive to light  Conjunctivae and EOM are normal  No scleral icterus  3 mm sluggish reaction to light  Extraocular movements intact  No nystagmus or roving movement   Neck: No JVD present  No tracheal deviation present  Cardiovascular: Normal rate, regular rhythm, normal heart sounds and intact distal pulses  No murmur heard    Pulmonary/Chest: Effort normal and breath sounds normal  He has no rales  Abdominal: Soft  Bowel sounds are normal  There is no tenderness  No peritoneal signs no organomegaly   Musculoskeletal: Normal range of motion  Range of motion limited to four-point restraints however no deficits noted   Neurological: He is alert  He has normal reflexes  He displays normal reflexes  No cranial nerve deficit  He exhibits normal muscle tone  Coordination normal    Oriented to person and place  Not oriented to time and situation  Able to tell me where he lives and his wife's name  Fluent speech   Skin: Skin is warm and dry  No rash noted  He is not diaphoretic  No erythema  Neurologic Exam   Mental Status:  Oriented to person, place only  Follows commands  Attention limited  Fluent speech  Cranial Nerves:  CN 2 - 12 intact without focal deficit  Motor Exam:  Muscle bulk: normal  Overall muscle tone: normal  Strength:  5 5 throughout  Motor: fine movements, normal finger to nose  Sensory Exam:  Pin Prick equal bilaterally  Gait, Coordination, and Reflexes   Reflexes: 2+ throughout   Babinski: downward  Gait:  Unable to assess secondary to restraints  Coordination: heel to shin unable to test secondary to restraints    Additional Data:     Lab Results: I Have Reviewed All Lab Data Below:      Results from last 7 days  Lab Units 12/12/18  0731   WBC Thousand/uL 13 38*   HEMOGLOBIN g/dL 10 5*   HEMATOCRIT % 33 3*   PLATELETS Thousands/uL 113*   NEUTROS PCT % 77*   LYMPHS PCT % 14   MONOS PCT % 9   EOS PCT % 0       Results from last 7 days  Lab Units 12/12/18  0600   POTASSIUM mmol/L 3 9   CHLORIDE mmol/L 103   CO2 mmol/L 25   BUN mg/dL 22   CREATININE mg/dL 1 51*   CALCIUM mg/dL 7 7*   ALK PHOS U/L 122*   ALT U/L 27   AST U/L 42       Results from last 7 days  Lab Units 12/11/18  2046   INR  1 19*       * Additional Pertinent Lab Tests Reviewed:  [address] Admission Reviewed    Imaging: I have personally reviewed pertinent reports  Ct Abdomen Pelvis Wo Contrast    Result Date: 12/11/2018  Narrative: CT ABDOMEN AND PELVIS WITHOUT IV CONTRAST INDICATION:   abd pain, s/p liver transplant  COMPARISON:  CT abdomen/pelvis dated March 16, 2012  TECHNIQUE:  CT examination of the abdomen and pelvis was performed without intravenous contrast   Axial, sagittal, and coronal 2D reformatted images were created from the source data and submitted for interpretation  Radiation dose length product (DLP) for this visit:  826 61 mGy-cm   This examination, like all CT scans performed in the [facility], was performed utilizing techniques to minimize radiation dose exposure, including the use of iterative  reconstruction and automated exposure control  Enteric contrast was not administered  FINDINGS: The study was mildly limited by patient motion and the patient's inability to raise his arms over his head  ABDOMEN LOWER CHEST:  No clinically significant abnormality identified in the visualized lower chest  LIVER/BILIARY TREE:  A transplanted liver is unchanged in appearance from the prior exam   No focal liver lesions  GALLBLADDER:  Gallbladder is surgically absent  SPLEEN:  Unremarkable  PANCREAS:  Unremarkable  ADRENAL GLANDS:  Unremarkable  KIDNEYS/URETERS:  Unremarkable  No hydronephrosis  STOMACH AND BOWEL:  Unremarkable  APPENDIX:  A normal appendix was visualized  ABDOMINOPELVIC CAVITY:  No ascites or free intraperitoneal air  No lymphadenopathy  VESSELS:  Unremarkable for patient's age  PELVIS REPRODUCTIVE ORGANS:  Unremarkable for patient's age  URINARY BLADDER:  Unremarkable  ABDOMINAL WALL/INGUINAL REGIONS:  Unremarkable  OSSEOUS STRUCTURES:  No acute fracture or destructive osseous lesion  Impression: No acute intra-abdominal abnormality  No free air or free fluid    The study was mildly limited by patient motion as the patient was unable to cooperate with the exam  Workstation performed: [id number]     Xr Chest 1 View Portable    Result Date: 12/11/2018  Narrative: CHEST INDICATION:   s/p IJ attempt  COMPARISON:  Chest x-ray dated December 11, 2018 at 2:24 PM  EXAM PERFORMED/VIEWS:  XR CHEST PORTABLE FINDINGS: Cardiomediastinal silhouette appears unremarkable  There is atherosclerotic calcification of the aortic arch  The lungs are clear  No pneumothorax or pleural effusion  There are orthopedic anchors noted at the left humeral head  Impression: No acute cardiopulmonary disease  No pneumothorax  Workstation performed: [id number]     Xr Chest Portable - 1 View    Result Date: 12/11/2018  Narrative: CHEST INDICATION:   altered   "altered mental status " COMPARISON:  Two-view chest 9/18/2017 EXAM PERFORMED/VIEWS:  XR CHEST PORTABLE FINDINGS: Cardiomediastinal silhouette appears unremarkable  The lungs are clear  No pneumothorax or pleural effusion  Osseous structures appear within normal limits for patient age  Impression: No acute cardiopulmonary disease  Workstation performed: [id number]     Ct Head Without Contrast    Result Date: 12/11/2018  Narrative: CT BRAIN - WITHOUT CONTRAST INDICATION:   Confusion/delirium, altered LOC, unexplained Headache, acute, severe, thunderclap, worst HA of life  COMPARISON:  CT brain dated September 19, 2017  TECHNIQUE:  CT examination of the brain was performed  In addition to axial images, coronal 2D reformatted images were created and submitted for interpretation  Radiation dose length product (DLP) for this visit:  1974 5 mGy-cm   This examination, like all CT scans performed in the [facility], was performed utilizing techniques to minimize radiation dose exposure, including the use of iterative  reconstruction and automated exposure control  IMAGE QUALITY:  Diagnostic  FINDINGS: PARENCHYMA:  No intracranial mass, mass effect or midline shift  No CT signs of acute infarction  No acute parenchymal hemorrhage    There is mild to moderate periventricular white matter low attenuation which is nonspecific and most likely related to chronic small vessel ischemic changes  There is a stable small focus of encephalomalacia involving the medial left temporal lobe, likely the sequela of prior ischemia  There is an old left basal ganglia lacunar infarct  There is a stable left supraclinoid aneurysm clip  VENTRICLES AND EXTRA-AXIAL SPACES:  There is prominence of the ventricles and sulci related to mild volume loss  VISUALIZED ORBITS AND PARANASAL SINUSES:  Unremarkable  CALVARIUM AND EXTRACRANIAL SOFT TISSUES:  Postoperative changes of a prior left pterional craniotomy are present  Impression: No acute intracranial abnormality  Mild to moderate chronic small vessel ischemic changes  No significant interval change when compared to a CT brain dated September 19, 2017   Workstation performed: [id number]         ** Please Note: Dragon 360 Dictation speech to text software may have been used in the creation of this document **

## 2018-12-12 NOTE — H&P
H&P- Miladys Wilson 1950, 77 y o  male MRN: [id number]    Unit/Bed#: ED 11 Encounter: [id number]    Primary Care Provider: Mis Jacob DO   Date and time admitted to hospital: 12/11/2018  1:28 PM        * Encephalopathy acute   Assessment & Plan    Unclear etiology  Due to his immunocompromised status he will need to be evaluated for meningitis, encephalitis  He was started empirically on with acyclovir, ampicillin, ceftriaxone, vancomycin  Due to agitation and severe encephalopathy he will require IR with anesthesia to obtain a spinal fluid sample for culture and PCR  IR has been consulted  Blood cultures drawn, currently pending, await results  Obtain MRI  Continue to trend lactate  Continue IV fluids  Consult with Infectious Disease and Neurology  Sepsis ([facility] )   Assessment & Plan    As above     Acute renal failure superimposed on stage 3 chronic kidney disease ([facility] )   Assessment & Plan    Baseline creatinine 1 6-1 7  Likely worsened today secondary to pre renal azotemia from vomiting  Will continue IV hydration  Monitor urine output  Monitor BMP daily  Consider renal imaging if no improvement     Hyponatremia   Assessment & Plan    Likely secondary to vomiting and poor oral intake  IV fluid hydration  Monitor BMP daily     CKD (chronic kidney disease) stage 3, GFR 30-59 ml/min ([facility])   Assessment & Plan    Avoid nephrotoxins  Renally dose medications, antibiotics     Liver replaced by transplant [facility])   Assessment & Plan    Check tacrolimus level  Continue Prograf 1 mg Q 12  Monitor LFTs     Personal history of transient ischemic attack (TIA), and cerebral infarction without residual deficits   Assessment & Plan    Hold Plavix for lumbar puncture  History of Present Illness     HPI:  Miladys Wilson is a 77 y o  male who presents with encephalopathy since 3:00 a m  This morning  At this time patient is not able to provide history secondary to encephalopathy sedation    His family are at the bedside and providing the majority of history  He woke up at approximately 3:00 a m  This morning with a frontal headache, nausea and vomiting, and change in mental status  Patient is Martiniquais-speaking and is nonverbal at this time secondary to sedation but family reports that even in Martiniquais they were unable to understand his speech  In the ED CT brain was negative, a lumbar puncture was attempted after sedation with olanzapine but the patient remained too combative  Cultures were drawn and he was given empiric antibiotics and referred for admission      Review of Systems   Unable to perform ROS: Mental status change       Historical Information   Past Medical History:   Diagnosis Date    Alcoholism ([facility] )     Cerebral artery aneurysm     Change in mental state     last assessed 5/18/15; resolved 10/27/15    Diabetes mellitus ([facility] )     Drug dependence ([place] [address] )     Fatigue     last assessed 1/26/15; resolved 5/24/16    Hepatitis C     Kidney disease     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[facility] )     [facility] recipient [facility])     Renal disorder     Subdural hygroma     2/27/14; resolved 7/28/15    Thrombocytopenia ([facility] ) 9/20/2017     Past Surgical History:   Procedure Laterality Date    BRAIN SURGERY  02/12/2014    left frontotemporal 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    CATARACT EXTRACTION      CHOLECYSTECTOMY      LIVER TRANSPLANTATION      ROTATOR CUFF REPAIR      SHOULDER SURGERY       Social History   History   Alcohol Use No     History   Drug Use No     Comment: remotely quit drug use per allscripts      History   Smoking Status    Never Smoker   Smokeless Tobacco    Never Used     Comment: former smoker per allscripts      Family History: non-contributory    Meds/Allergies   all medications and allergies reviewed  Allergies   Allergen Reactions    Ciprofloxacin     Iv Contrast [Iodinated Diagnostic Agents]     Levaquin [Levofloxacin]     Omnipaque [Iohexol]        Objective   Vitals: Blood pressure (!) 167/123, pulse 89, temperature 99 4 °F (37 4 °C), temperature source Rectal, resp  rate 20, SpO2 100 %  No intake or output data in the 24 hours ending 12/11/18 2016    Invasive Devices     Peripheral Intravenous Line            Peripheral IV 12/11/18 Left Foot less than 1 day                Physical Exam   Constitutional: He appears well-developed and well-nourished  Lying in bed, 4 point restraints, struggling to free himself from restraints, eyes closed, not responding to questions  HENT:   Head: Normocephalic and atraumatic  Mouth/Throat: No oropharyngeal exudate  Eyes: Right eye exhibits no discharge  Left eye exhibits no discharge  No scleral icterus  Pupils are pinpoint   Neck: No JVD present  Resist examination   Cardiovascular: Normal rate and regular rhythm  Pulmonary/Chest: Effort normal  He has no wheezes  He has no rales  Abdominal: Soft  He exhibits no distension  There is no tenderness  Musculoskeletal: Normal range of motion  He exhibits no edema  Neurological:   Sedated but actively trying to free himself restraints in bed   Skin: Skin is warm and dry  Psychiatric: He has a normal mood and affect  His behavior is normal        Lab Results: I have personally reviewed pertinent reports  Imaging: I have personally reviewed pertinent reports  EKG, Pathology, and Other Studies: I have personally reviewed pertinent reports  Code Status: Prior  Advance Directive and Living Will:      Power of :    POLST:      Counseling / Coordination of Care  Total floor / unit time spent today 50 minutes  Greater than 50% of total time was spent with the patient and / or family counseling and / or coordination of care  A description of the counseling / coordination of care:  Discussed plan of care with the patient, as family at the bedside, interventional radiology  Lois Cary

## 2018-12-12 NOTE — OCCUPATIONAL THERAPY NOTE
[address] Evaluation     Patient Name: Marleni Kapadia  Today's Date: 12/12/2018  Problem List  Patient Active Problem List   Diagnosis    CVA [facility]cerebral vascular accident) [facility] )    Controlled diabetes mellitus with diabetic neuropathy, with long-term current use of insulin ([facility] )    Liver replaced by transplant ([facility] )    History of immunosuppression therapy    History of hepatitis C    CKD (chronic kidney disease) stage 3, GFR 30-59 ml/min (HCC)    Thrombocytopenia (HCC)    Congenital anomaly of accessory auricle    Cerebral arterial aneurysm    Erectile dysfunction of non-organic origin    Headache, chronic daily    Insomnia    Migraine headache    Occipital neuralgia    Other cirrhosis of liver (HCC)    Peripheral neuropathy    Prurigo nodularis    Pruritus    Spondylosis of cervical region without myelopathy or radiculopathy    Vertigo    Vitamin D deficiency    Medicare annual wellness visit, subsequent    Screening for colon cancer    Hepatitis B core antibody positive    Nasal lesion    Rash    Personal history of transient ischemic attack (TIA), and cerebral infarction without residual deficits    Encephalopathy acute    Sepsis ([facility] )    Acute renal failure superimposed on stage 3 chronic kidney disease ([facility] )    Hyponatremia     Past Medical History  Past Medical History:   Diagnosis Date    Alcoholism ([facility] )     Cerebral artery aneurysm     Change in mental state     last assessed 5/18/15; resolved 10/27/15    Diabetes mellitus ([facility] )     Drug dependence ([facility] )     Fatigue     last assessed 1/26/15; resolved 5/24/16    Hepatitis C     Kidney disease     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[facility] )     Liver transplant recipient [facility])     Renal disorder     Subdural hygroma     2/27/14; resolved 7/28/15    Thrombocytopenia ([facility] ) 9/20/2017     Past Surgical History  Past Surgical History:   Procedure Laterality Date    BRAIN SURGERY  02/12/2014    left frontotemporal 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    CATARACT EXTRACTION      CHOLECYSTECTOMY      LIVER TRANSPLANTATION      ROTATOR CUFF REPAIR      SHOULDER SURGERY           12/12/18 0945   Note Type   Note type Eval/Treat   Restrictions/Precautions   Weight Bearing Precautions Per Order No   Other Precautions 1:1;Restraints; Bed Alarm;Multiple lines;Telemetry; Fall Risk  (Sinhala SPEAKING)   Pain Assessment   Pain Assessment No/denies pain   Pain Score No Pain   Home Living   Type of Home House   Home Layout Multi-level  (0 chidi)   Bathroom Shower/Tub Tub/shower unit   Bathroom Toilet Standard   Prior Function   Level of [place] Independent with ADLs and functional mobility   Lives With [employer] Help From Family   ADL Assistance Independent   IADLs Independent   Falls in the last 6 months 0   Vocational Retired   Lifestyle   Autonomy pta pt reports I in ADLS/IADLs/fxn'l mob: no use of DME   Reciprocal Relationships has family   Service to Others retired   [address] enjoys watching tv   Psychosocial   Psychosocial (WDL) WDL   Subjective   Subjective "I have to go to the bathroom"   ADL   Where Assessed Edge of bed   Eating Assistance 4  Minimal Assistance   Eating Deficit Other (Comment)  (restraints)   Grooming Assistance 4  Minimal Assistance   UB Bathing Assistance 4  Minimal Assistance   LB Bathing Assistance 3  Moderate Assistance   UB Dressing Assistance 4  Minimal Assistance   LB Dressing Assistance 3  Moderate Assistance   Bed Mobility   Rolling R 4  Minimal assistance   Additional items Assist x 1;Bedrails   Rolling L 4  Minimal assistance   Additional items Assist x 1;Bedrails   Supine to Sit 2  Maximal assistance   Additional items Assist x 1   Transfers   Sit to Stand 4  Minimal assistance   Additional items Assist x 2; Increased time required   Stand to Sit 4  Minimal assistance   Additional items Assist x 2; Increased time required   Functional Mobility   Functional Mobility 4  Minimal assistance   Additional Comments 6 lateral side steps   Additional items Hand hold assistance   Balance   Static Sitting Fair +   Dynamic Sitting Fair   Static Standing Fair -   Dynamic Standing Poor +   Activity Tolerance   Activity Tolerance Patient tolerated treatment well   Medical Staff Made Aware 1:1 present, PT Suki present   Nurse Made Aware okay to see per RN, RN updated w/ pt's increased ability to follow commands   RUE Assessment   RUE Assessment WFL   LUE Assessment   LUE Assessment WFL   Hand Function   Gross Motor Coordination Functional   Fine Motor Coordination Functional   Cognition   Overall Cognitive Status Impaired   Arousal/Participation Cooperative   Attention Attends with cues to redirect   Orientation Level Oriented to person;Disoriented to place; Disoriented to time;Disoriented to situation   Memory Decreased short term memory;Decreased recall of recent events;Decreased recall of precautions   Following Commands Follows one step commands with increased time or repetition   Assessment   Limitation Decreased ADL status; Decreased Safe judgement during ADL;Decreased cognition;Decreased endurance;Decreased self-care trans;Decreased high-level ADLs; Decreased fine motor control;Mood limitation   Prognosis Fair   Assessment Pt is a 77* YO  Male admitted [facility] w/ AMS and flu like symptoms  Comorbidities include a h/o CARLOTTA, liver transplant, cirrhosis, hep C, cerebral artery aneurysm, DM, and subdural hygroma    Pt with active OT orders and up with assistance  orders  Pt resides in a multi level home with family  Pt was I w/  ADLS and IADLS, drove, & required no use of DME PTA  Currently pt is Max A for bed mobility, Min A for transfers and side steps, and max a for LB dressing/bathing   Pt is limited at this time 2*: endurance, activity tolerance, functional mobility, balance, trunk control, functional standing tolerance, unsupportive home environment, decreased I w/ ADLS/IADLS, cognitive impairments, decreased safety awareness, decreased insight into deficits and medical status  The following Occupational Performance Areas to address include: grooming, bathing/shower, toilet hygiene, dressing, health maintenance, functional mobility, community mobility, clothing management, household maintenance and care of children  Pt scored overall  45/100 on the Barthel Index  Based on the aforementioned OT evaluation, functional performance deficits, and assessments, pt has been identified as a high complexity evaluation  From OT standpoint, anticipate d/c home with family support pending progress  Pt to continue to benefit from acute immediate OT services to address the following goals 3-5x/week to  w/in 10-14 days: [employer] Goals   Patient Goals to use the bedpan   LTG Time Frame 10-   Plan   Treatment Interventions ADL retraining;Functional transfer training; Endurance training;Cognitive reorientation;Patient/family training;Equipment evaluation/education; Compensatory technique education;Continued evaluation; Energy conservation; Activityengagement   Goal Expiration Date 18   OT Frequency 3-5x/wk   Recommendation   OT Discharge Recommendation Home with family support  (pending progress )   OT - OK to Discharge No   Barthel Index   Feeding 5   Bathing 0   Grooming Score 5   Dressing Score 5   Bladder Score 10   Bowels Score 10   Toilet Use Score 5   Transfers (Bed/Chair) Score 5   Mobility (Level Surface) Score 0   Stairs Score 0   Barthel Index Score 45   Modified Paris Scale   Modified Raj Scale 4     GOALS    1) Pt will increase activity tolerance to G for 30 min txment sessions    2) Pt will complete UB/LB dressing/self care w/ mod I using adaptive device and DME as needed    3) Pt will complete bathing w/ Mod I w/ use of AE and DME as needed    4) Pt will complete toileting w/ mod I w/ G hygiene/thoroughness using DME as needed    5) Pt will improve functional transfers to Mod I on/off all surfaces using DME as needed w/ G balance/safety     6) Pt will improve functional mobility during ADL/IADL/leisure tasks to Mod I using DME as needed w/ G balance/safety     7) Pt will participate in simulated IADL management task to increase independence to  w/ G safety and endurance    8) Pt will engage in ongoing cognitive assessment w/ G participation to assist w/ safe d/c planning/recommendations    9) Pt will demonstrate G carryover of pt/caregiver education and training as appropriate  w/ good tolerance    10) Pt will demonstrate 100% carryover of energy conservation techniques t/o functional I/ADL/leisure tasks w/o cues s/p skilled education    11) Pt will independently identify and utilize 2-3 coping strategies to increase positive affect and promote overall well-being      Arvin Elias MS, OTR/L

## 2018-12-12 NOTE — ASSESSMENT & PLAN NOTE
Baseline creatinine 1 6-1 7  Likely worsened today secondary to pre renal azotemia from vomiting  Will continue IV hydration  Monitor urine output  Monitor BMP daily    Consider renal imaging if no improvement

## 2018-12-12 NOTE — ASSESSMENT & PLAN NOTE
Baseline creatinine 1 6-1 7    Presented with creatinine around 2, current creatinine 1 5 on IV fluids, will discontinue and continue to monitor

## 2018-12-12 NOTE — PLAN OF CARE
Problem: DISCHARGE PLANNING - CARE MANAGEMENT  Goal: Discharge to post-acute care or home with appropriate resources  INTERVENTIONS:  - Conduct assessment to determine patient/family and health care team treatment goals, and need for post-acute services based on payer coverage, community resources, and patient preferences, and barriers to discharge  - Address psychosocial, clinical, and financial barriers to discharge as identified in assessment in conjunction with the patient/family and health care team  - Arrange appropriate level of post-acute services according to patient's   needs and preference and payer coverage in collaboration with the physician and health care team  - Communicate with and update the patient/family, physician, and health care team regarding progress on the discharge plan  - Arrange appropriate transportation to post-acute venues  When medically clear will discharge home vs snf     Outcome: Progressing

## 2018-12-12 NOTE — UTILIZATION REVIEW
Initial Clinical Review    Admission: Date/Time/Statement: 12/11/18 @ 1905     Orders Placed This Encounter   Procedures    Inpatient Admission (expected length of stay for this patient is greater than two midnights)     Standing Status:   Standing     Number of Occurrences:   1     Order Specific Question:   Admitting Physician     Answer:   Jesenia Reaves [[id number]]     Order Specific Question:   Level of Care     Answer:   Level 2 Stepdown / HOT [14]     Order Specific Question:   Estimated length of stay     Answer:   More than 2 Midnights     Order Specific Question:   Certification     Answer:   I certify that inpatient services are medically necessary for this patient for a duration of greater than two midnights  See H&P and MD Progress Notes for additional information about the patient's course of treatment  ED Arrival Information     Expected Arrival Acuity Means of Arrival Escorted By Service Admission Type    - 12/11/2018 11:53 Emergent Walk-In Other Hospitalist Emergency    Arrival Complaint    Flu Symptoms            Chief Complaint   Patient presents with    Flu Symptoms     pt reports flu symptoms at home, vomiting, fever  pt family also reports he has been disoriented       History of Illness:    77year old male presents to the Ed with encephalopathy since  3 am this morning associated with frontal headache, nausea and vomiting  Patient is Faroese speaking only but family reports they cannot understand his speech    Lumbar puncture under olanzapine sedation attempted unsuccessfully in ed as patient remained combative     History of Orthotopic liver transplantation [facility] secondary to alcoholic and IV drug abuse hepatitis C  History of hepatitis-C status post antiviral therapy completed     ED Vital Signs:   12/11/18 1204 12/11/18 1204 12/11/18 1204 12/11/18 1206 12/11/18 1204   98 4 °F (36 9 °C) 91 20 131/71 100 %      Pain Score       5       12/11/18 61 2 kg (135 lb)       Vital Signs (abnormal):     Temp  101     Heart rate  90  104    Blood pressure   167/133       Abnormal Labs/Diagnostic Test Results:       Glucose 407    Wbc 10 44   Increase to  13 38    Sodium 126    Creatinine  2 00   Lactic acid  5 4    Lumbar puncture  Unsuccessful   Acetaminophen <2   Salicylate <3   Carbon monoxide 1 6 H   Blood culture x 2__   Influenza__  Cryptococcal antigen__   Procalcitonin ___    ED Treatment:   Medication Administration from 12/11/2018 1153 to 12/11/2018 2058       Date/Time Order Dose Route     12/11/2018 1442 sodium chloride 0 9 % bolus 1,000 mL 1,000 mL Intravenous     12/11/2018 1436 fentanyl citrate (PF) 100 MCG/2ML 50 mcg 50 mcg Intravenous     12/11/2018 1642 lidocaine (PF) (XYLOCAINE-MPF) 1 % injection 10 mL 10 mL Infiltration     12/11/2018 1651 OLANZapine (ZyPREXA) IM injection 10 mg 5 mg Intramuscular     12/11/2018 1658 sterile water injection **ADS Override Pull**        12/11/2018 1822 thiamine (VITAMIN B1) 100 mg in sodium chloride 0 9 % 50 mL IVPB 100 mg Intravenous     12/11/2018 1800 sterile water injection **ADS Override Pull** 2 1 mL      12/11/2018 1800 OLANZapine (ZyPREXA) IM injection 10 mg 10 mg Intramuscular     12/11/2018 1926 cefTRIAXone (ROCEPHIN) 2,000 mg in dextrose 5 % 50 mL IVPB 2,000 mg Intravenous     12/11/2018 1851 sodium chloride 0 9 % bolus 1,000 mL 1,000 mL Intravenous     12/11/2018 2006 ampicillin (OMNIPEN) 2,000 mg in sodium chloride 0 9 % 100 mL IVPB 2,000 mg Intravenous          Past Medical/Surgical History:     Diagnosis    [facility]A [facility]cerebral vascular accident) [facility]    Controlled diabetes mellitus with diabetic neuropathy, with long-term current use of insulin ([facility] )    Liver replaced by transplant ([facility] )    History of immunosuppression therapy    History of hepatitis C    CKD (chronic kidney disease) stage 3, GFR 30-59 ml/min (HCC)    Thrombocytopenia (HCC)    Congenital anomaly of accessory auricle    Cerebral arterial aneurysm    Erectile dysfunction of non-organic origin    Headache, chronic daily    Insomnia    Migraine headache    Occipital neuralgia    Other cirrhosis of liver (HCC)    Peripheral neuropathy    Prurigo nodularis    Pruritus    Spondylosis of cervical region without myelopathy or radiculopathy    Vertigo    Vitamin D deficiency    Medicare annual wellness visit, subsequent    Screening for colon cancer    Hepatitis B core antibody positive    Nasal lesion    Rash    Personal history of transient ischemic attack (TIA), and cerebral infarction without residual deficits           Admitting Diagnosis:     Dehydration [E86 0]  Lactate blood increase [R79 89]  Acute encephalopathy [G93 40]  Flu-like symptoms [R68 89]    Age/Sex: 77 y o  male with fever and in restraints due to agitation  Patient struggling to free himself despite sedation, eyes closed and not responding to verbal   Pupils are pinpoint  2LO2 nc required to maintain sats of  98%        Assessment/Plan:     Encephalopathy acute   Assessment & Plan     Unclear etiology  Due to his immunocompromised status he will need to be evaluated for meningitis, encephalitis  He was started empirically on with acyclovir, ampicillin, ceftriaxone, vancomycin  Due to agitation and severe encephalopathy he will require IR with anesthesia to obtain a spinal fluid sample for culture and PCR  IR has been consulted  Blood cultures drawn, currently pending, await results  Obtain MRI  Continue to trend lactate  Continue IV fluids  Consult with Infectious Disease and Neurology  Consult neurology    Encephalopathy acute- Strong consideration for infectious etiology / septic encephalopathy in the setting of his immunocompromised state and fevers  Metabolic causes less likely with normal ammonia and normal electrolytes and improved renal function at baseline without overt azotemia    I do not think this is hepatic encephalopathy with normal ammonia, LFTs and INR         Admission Orders:    Continual observation  Restraints  Fingerstick glucose  Npo sips  Telemetry  Consult infectious disease   PT and OT eval and treat   MRI brain  IR consult for sedated lumbar puncture       Scheduled Meds:     acetaminophen 650 mg Oral Q6H PRN   acyclovir 10 mg/kg (Ideal) Intravenous Q12H   ampicillin 2,000 mg Intravenous Q6H   cefTRIAXone 2,000 mg Intravenous Q12H   clopidogrel 75 mg Oral Daily   heparin (porcine) 5,000 Units Subcutaneous Q8H [address]   insulin glargine 24 Units Subcutaneous HS   OLANZapine 10 mg Intramuscular Q8H PRN   sodium chloride 100 mL/hr Intravenous Continuous   tacrolimus 1 mg Oral Q12H JUNIOR   vancomycin 15 mg/kg Intravenous Q24H

## 2018-12-12 NOTE — ASSESSMENT & PLAN NOTE
Unclear etiology, possibly multifactorial related to acute kidney injury and hyponatremia together with possible infection cause such as meningitis  Patient was started on acyclovir, ampicillin, Rocephin, vancomycin  Discontinue restraints today as patient is more cooperative and awake, but continue with one-to-one observation for now  Lumbar puncture discussed with infectious disease, interventional Radiology, neurology----> as per Neurology non emergent, last dose of Plavix about 4 days ago at this point, per interventional Radiology as per protocol patient should be off Plavix for 5 days, will discuss with IR again tomorrow as per Neurology recommendation, for now continue with antiviral and antibiotics as above  Also, MRI ordered and pending

## 2018-12-12 NOTE — PLAN OF CARE
Problem: PHYSICAL THERAPY ADULT  Goal: Performs mobility at highest level of function for planned discharge setting  See evaluation for individualized goals  Treatment/Interventions: Functional transfer training, LE strengthening/ROM, Endurance training, Therapeutic exercise, Bed mobility, Gait training, Spoke to nursing, OT  Equipment Recommended:  (Continue to assess)       See flowsheet documentation for full assessment, interventions and recommendations  Prognosis: Good  Problem List: Decreased endurance, Impaired balance, Decreased mobility, Decreased coordination, Decreased cognition, Impaired judgement, Decreased safety awareness  Assessment: Pt is a 76 yo male presenting to [facility] on 12/11/18 with AMS, diagnosed with encephalopathy  PMH is significant for: alcoholism, cerebral artery aneurysm, DM, Hep C, kidney disease, Laennec's cirrhosis, s/p liver transplant, thrombocytopenia  Pt is supine at start of session and agreeable to therapy, reporting "I need to use the bathroom " Pt rolled R<> L with Cash and use of bed rails  Pt transferred supine <> sit with maxA x1  Pt transferred sit <> stand with with Cash x2  Pt took x6 side steps with Cash x2 for safety  Pt presents with B/L knee buckle and retropulsion  Pt remained supine in bed with 4 point restraints in place and all needs within reach, PCA at bedside  PT to recommend inpt rehab vs home with home PT pending progress  PT to follow up  Barriers to Discharge: Decreased caregiver support (Unclear level of support at home)     Recommendation: Home PT, Post acute IP rehab (HHPT vs inpt rehab pending progress)     PT - OK to Discharge: No (HHPT vs rehab)    See flowsheet documentation for full assessment         Comments: Susan Pena, PT, DPT

## 2018-12-12 NOTE — CONSULTS
Consultation - Infectious Disease   Margarette Palmer 77 y o  male MRN: [id number]  Unit/Bed#: [facility] 807-01 Encounter: [id number]      IMPRESSION & RECOMMENDATIONS:   1  Fever and encephalopathy-unclear etiology  Certainly with associated headache, and in his immunosuppressed state, 1 must consider the possibility of an acute CNS infection such as meningitis or encephalitis  However, the symptomatology seems to be markedly improved within well less than 24 hr which would make these CNS infections less likely  Consideration for the possibility of encephalopathy associated with another infectious etiology such as a occult bacterial infection or influenza  Consideration for the possibility of medication effect  Fortunately, the patient remains hemodynamically stable and nontoxic despite his systemic illness  He seems to be tolerating the antibiotics well without difficulty  -continue vancomycin and ampicillin for now at current dose  -increase the ceftriaxone to 2 g IV q 12 hours  -decrease the acyclovir to 600 mg IV q 12 hours with the patient's acute kidney injury  -aggressive hydration to decrease the risk of acyclovir nephrotoxicity  -await lumbar puncture  -close neurology follow-up  -monitor CBC with diff, and creatinine closely  -will recheck blood cultures x2 sets as only 1 blood culture was sent  -check influenza by PCR  -pharmacy consult for vancomycin trough management    2  Sepsis-POA  Fever and tachycardia  Fortunately the patient remains hemodynamically stable and nontoxic despite the systemic illness  Unclear etiology at this point  Procalcitonin level is actually normal   -antibiotics as above  -workup as above  -supportive care    3  Acute kidney injury-the setting of chronic kidney disease  Suspected pre renal issue  The renal function has improved with IV hydration  -monitor the GFR  -dose adjusted antibiotics  -for volume management       4  Liver transplantation-on immunosuppressive therapy  Discussed in detail with the primary service     HISTORY OF PRESENT ILLNESS:  Reason for Consult:  Fever and encephalopathy  HPI: Marleni Kapadia is a 77y o  year old male with a history of cirrhosis secondary to alcohol abuse and hepatitis-C in the setting of previous injection drug use, status post liver transplantation back in 2002 admitted to [facility] in [place] with fever and confusion who I am asked to assist with management  The patient apparently do min doing well until the day prior to admission when he awoke at 3:00 a m  With a sudden and severe headache with some confusion  He apparently developed nausea and vomiting  Over the past week prior to this admission the patient apparently had developed some fever, chills, cough, and shortness of breath  Patient was brought to the emergency department for further evaluation  In the emergency department the patient was found to have acute encephalopathy, underwent imaging of his brain chest and abdomen without any source found  He had an attempted lumbar puncture that was unsuccessful  Because of the confusion, and the development of fever, he was placed on vancomycin, ceftriaxone, ampicillin, and acyclovir and admitted for further management  He had 1 set of blood cultures drawn  Today the patient apparently is much more alert interactive  He is able answer simple yes no questions  He denies any current headache or stiff neck, denies any sore throat or rhinorrhea or nasal congestion, denies any cough or difficulty breathing, denies any chest pain or abdominal pain, denies any nausea vomiting or diarrhea  He does not recall the events of yesterday  He has not been having any new rash or skin lesions  REVIEW OF SYSTEMS:  A complete 12 point system-based review of systems is negative other than that noted in the HPI      PAST MEDICAL HISTORY:  Past Medical History:   Diagnosis Date    Alcoholism [facility])     Cerebral artery aneurysm  Change in mental state     last assessed 5/18/15; resolved 10/27/15    Diabetes mellitus ([facility] )     Drug dependence ([place] [address] )     Fatigue     last assessed 1/26/15; resolved 16    Hepatitis C     Kidney disease     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[facility] )     Liver transplant recipient [facility])     Renal disorder     Subdural hygroma     14; resolved 7/28/15    Thrombocytopenia ([facility] ) 2017     Past Surgical History:   Procedure Laterality Date    BRAIN SURGERY  2014    left frontotemporal 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    CATARACT EXTRACTION      CHOLECYSTECTOMY      LIVER TRANSPLANTATION      ROTATOR CUFF REPAIR      SHOULDER SURGERY         FAMILY HISTORY:  Non-contributory    SOCIAL HISTORY:  Social History   History   Alcohol Use No     History   Drug Use No     Comment: remotely quit drug use per allscripts      History   Smoking Status    Never Smoker   Smokeless Tobacco    Never Used     Comment: former smoker per allscripts        ALLERGIES:  Allergies   Allergen Reactions    Ciprofloxacin     Iv Contrast [Iodinated Diagnostic Agents]     Levaquin [Levofloxacin]     Omnipaque [Iohexol]        MEDICATIONS:  All current active medications have been reviewed    Antibiotics:  Ceftriaxone, ampicillin, vancomycin, acyclovir 2    PHYSICAL EXAM:  Temp:  [97 8 °F (36 6 °C)-101 °F (38 3 °C)] 97 8 °F (36 6 °C)  HR:  [] 85  Resp:  [12-20] 20  BP: (129-168)/() 130/90  SpO2:  [97 %-100 %] 98 %  Temp (24hrs), Av 5 °F (37 5 °C), Min:97 8 °F (36 6 °C), Max:101 °F (38 3 °C)  Current: Temperature: 97 8 °F (36 6 °C) (Vitals done by 1:1)    Intake/Output Summary (Last 24 hours) at 18 1100  Last data filed at 18 1038   Gross per 24 hour   Intake             1100 ml   Output              778 ml   Net              322 ml       General Appearance:  Appearing well, nontoxic, and in no distress   Head:  Normocephalic, without obvious abnormality, atraumatic   Eyes:  Conjunctiva pale and sclera anicteric, both eyes   Nose: Nares normal, mucosa normal, no drainage   Throat: Oropharynx moist without lesions   Neck: Supple, symmetrical, no adenopathy, no tenderness/mass/nodules   Back:   Symmetric, no curvature, ROM normal, no CVA tenderness   Lungs:   Clear to auscultation bilaterally, respirations unlabored   Chest Wall:  No tenderness or deformity   Heart:  RRR; no murmur, rub or gallop   Abdomen:   Soft, non-tender, non-distended, positive bowel sounds    Extremities: No cyanosis, clubbing or edema   Skin: No rashes or lesions  No draining wounds noted  Lymph nodes: Cervical, supraclavicular nodes normal   Neurologic: Alert, answer simple questions appropriately, able to move all 4 extremities  LABS, IMAGING, & OTHER STUDIES:  Lab Results:  I have personally reviewed pertinent labs  Results from last 7 days  Lab Units 12/12/18  0731 12/11/18  2212 12/11/18  1413   WBC Thousand/uL 13 38*  --  10 44*   HEMOGLOBIN g/dL 10 5*  --  12 2   PLATELETS Thousands/uL 113* 122* 124*       Results from last 7 days  Lab Units 12/12/18  0600 12/11/18  1413   SODIUM mmol/L 137 126*   POTASSIUM mmol/L 3 9 4 3   CHLORIDE mmol/L 103 94*   CO2 mmol/L 25 22   BUN mg/dL 22 24   CREATININE mg/dL 1 51* 2 00*   EGFR ml/min/1 73sq m 46 33   CALCIUM mg/dL 7 7* 9 8   AST U/L 42 34   ALT U/L 27 29   ALK PHOS U/L 122* 155*        blood cultures x1 set pending    Imaging Studies:   I have personally reviewed pertinent imaging study reports and images in PACS      Chest x-ray-no acute cardiopulmonary disease    CT head-no acute intracranial abnormality    CT abdomen pelvis-no acute intra-abdominal abnormality

## 2018-12-12 NOTE — SOCIAL WORK
CM reviewed pt chart and left a voice message for pt [facility] [phone]  Pt lives with Lita Dye and his  daughter in a 2 story house with 3 DAWN  There is a 1st floor set up and pt does not go to the 2nd floor  Pt has no LW and POA  Pt was IPTA with all ADL's, drive, retired  Pt has no DME  Pt has hx of STR with [facility] and HHC with [facility]  Pt has no hx with drug and psych tx  Pt has hx of alcohol rehab  Pt gets medication from [facility] at [address] [facility] will provide pt  transportation when medically clear for discharge  CM will follow  CM reviewed d/c planning process including the following: identifying help at home, patient preference for d/c planning needs, Discharge Lounge, [facility] Meds to Bed program, availability of treatment team to discuss questions or concerns patient and/or family may have regarding understanding medications and recognizing signs and symptoms once discharged  CM also encouraged patient to follow up with all recommended appointments after discharge   Patient advised of importance for patient and family to participate in managing patients medical well being

## 2018-12-12 NOTE — PROGRESS NOTES
Progress Note - Trudi Bone 1950, 77 y o  male MRN: [id number]    Unit/Bed#: [facility] 807-01 Encounter: [id number]    Primary Care Provider: Vincenzo Milton DO   Date and time admitted to hospital: 12/11/2018  1:28 PM        Hyponatremia   Assessment & Plan    Likely related vomiting and hypovolemia, resolved with IV fluids, monitor     Acute renal failure superimposed on stage 3 chronic kidney disease ([facility] )   Assessment & Plan    Baseline creatinine 1 6-1 7  Presented with creatinine around 2, current creatinine 1 5 on IV fluids, will discontinue and continue to monitor     Sepsis [facility])   Assessment & Plan    Last fever 3:00 a m , improving     Personal history of transient ischemic attack (TIA), and cerebral infarction without residual deficits   Assessment & Plan    Hold Plavix for lumbar puncture     Liver replaced by transplant [facility])   Assessment & Plan    Continue with tacrolimus level  Continue Prograf 1 mg Q 12    LFTs are stable  Consult Gastroenterology for further recommendation in terms of immunosuppression      * Encephalopathy acute   Assessment & Plan    Unclear etiology, possibly multifactorial related to acute kidney injury and hyponatremia together with possible infection cause such as meningitis  Patient was started on acyclovir, ampicillin, Rocephin, vancomycin  Discontinue restraints today as patient is more cooperative and awake, but continue with one-to-one observation for now  Lumbar puncture discussed with infectious disease, interventional Radiology, neurology----> as per Neurology non emergent, last dose of Plavix about 4 days ago at this point, per interventional Radiology as per protocol patient should be off Plavix for 5 days, will discuss with IR again tomorrow as per Neurology recommendation, for now continue with antiviral and antibiotics as above  Also, MRI ordered and pending         VTE Pharmacologic Prophylaxis: yes  Pharmacologic: Heparin  Mechanical VTE Prophylaxis in Place: Yes    Patient Centered Rounds: I have performed bedside rounds with nursing staff today  Discussions with Specialists or Other Care Team Provider:  Neurology, Interventional Radiology, Infectious Disease    Education and Discussions with Family / Patient:  Patient, family at bedside    Time Spent for Care: 45 minutes  More than 50% of total time spent on counseling and coordination of care as described above  Current Length of Stay: 1 day(s)    Current Patient Status: Inpatient   Certification Statement: The patient will continue to require additional inpatient hospital stay due to Please refer to above    Discharge Plan: To be determined    Code Status: Level 1 - Full Code      Subjective:   Patient states that yesterday he had a headache but currently headache is resolved  He did not know why she is in the hospital, he did not remember was sick  Calm, pleasant, cooperative    Objective:     Vitals:   Temp (24hrs), Av 5 °F (37 5 °C), Min:97 8 °F (36 6 °C), Max:101 °F (38 3 °C)    Temp:  [97 8 °F (36 6 °C)-101 °F (38 3 °C)] 99 1 °F (37 3 °C)  HR:  [] 75  Resp:  [12-20] 16  BP: (129-167)/() 147/68  SpO2:  [97 %-100 %] 98 %  Body mass index is 23 17 kg/m²  Input and Output Summary (last 24 hours): Intake/Output Summary (Last 24 hours) at 18 1639  Last data filed at 18 1430   Gross per 24 hour   Intake             1100 ml   Output              778 ml   Net              322 ml       Physical Exam:     Physical Exam   Constitutional: He appears well-developed  Cardiovascular: Normal rate, regular rhythm and normal heart sounds  Exam reveals no friction rub  No murmur heard  Pulmonary/Chest: Effort normal  No respiratory distress  He has no wheezes  He has no rales  Abdominal: Soft  He exhibits no distension  There is no tenderness  There is no rebound  Musculoskeletal: He exhibits no edema  Neurological: He is alert     Oriented to self, oriented to person Skin: Skin is warm  Psychiatric: He has a normal mood and affect  His behavior is normal  Thought content normal        Additional Data:     Labs:      Results from last 7 days  Lab Units 12/12/18  0731   WBC Thousand/uL 13 38*   HEMOGLOBIN g/dL 10 5*   HEMATOCRIT % 33 3*   PLATELETS Thousands/uL 113*   NEUTROS PCT % 77*   LYMPHS PCT % 14   MONOS PCT % 9   EOS PCT % 0       Results from last 7 days  Lab Units 12/12/18  0600   POTASSIUM mmol/L 3 9   CHLORIDE mmol/L 103   CO2 mmol/L 25   BUN mg/dL 22   CREATININE mg/dL 1 51*   CALCIUM mg/dL 7 7*   ALK PHOS U/L 122*   ALT U/L 27   AST U/L 42       Results from last 7 days  Lab Units 12/11/18  2046   INR  1 19*       * I Have Reviewed All Lab Data Listed Above  * Additional Pertinent Lab Tests Reviewed:  All Labs Within Last 24 Hours Reviewed    Imaging:    Imaging Reports Reviewed Today Include:  Images done during the admission  Imaging Personally Reviewed by Myself Includes:  None    Recent Cultures (last 7 days):       Results from last 7 days  Lab Units 12/12/18  1222   INFLUENZA B PCR  None Detected   RSV PCR  None Detected       Last 24 Hours Medication List:     Current Facility-Administered Medications:  acetaminophen 650 mg Oral Q6H PRN Sherolyn Duverney, PA-C    acyclovir 10 mg/kg (Ideal) Intravenous Q12H Octavio Holcomb MD    ampicillin 2,000 mg Intravenous Q6H Adeline Renee MD Last Rate: 2,000 mg (12/12/18 1407)   cefTRIAXone 2,000 mg Intravenous Q12H Octavio Holcomb MD Last Rate: 2,000 mg (12/12/18 1221)   heparin (porcine) 5,000 Units Subcutaneous Q8H [facility] Adeline Renee MD    insulin glargine 12 Units Subcutaneous HS Ismael Hsieh MD    OLANZapine 10 mg Intramuscular Q8H PRN Adeilne Renee MD    tacrolimus 1 mg Oral Q12H Fabián Hammond MD    vancomycin 20 mg/kg Intravenous Q24H Octavio Holcomb MD         Today, Patient Was Seen By: Ismael Hsieh MD    ** Please Note: Dragon 360 Dictation voice to text software may have been used in the creation of this document   **

## 2018-12-12 NOTE — PROGRESS NOTES
Vancomycin Assessment    Noemi Roberson is a 77 y o  male who is currently receiving vancomycin 1250 Q24H for other SEPTIC ENCEPHALOPATHY   Relevant clinical data and objective history reviewed:  Creatinine   Date Value Ref Range Status   12/12/2018 1 51 (H) 0 60 - 1 30 mg/dL Final     Comment:     Standardized to IDMS reference method   12/11/2018 2 00 (H) 0 60 - 1 30 mg/dL Final     Comment:     Standardized to IDMS reference method   05/29/2018 1 68 (H) 0 60 - 1 30 mg/dL Final     Comment:     Standardized to IDMS reference method   12/17/2015 1 81 (H) 0 60 - 1 30 mg/dL Final     Comment:     Standardized to IDMS reference method   12/16/2015 1 54 (H) 0 60 - 1 30 mg/dL Final     Comment:     Standardized to IDMS reference method   12/15/2015 1 60 (H) 0 60 - 1 30 mg/dL Final     Comment:     Standardized to IDMS reference method     Vancomycin Rm   Date Value Ref Range Status   12/12/2018 16 0 ug/mL Final     /62   Pulse 79   Temp 98 8 °F (37 1 °C) (Oral)   Resp 16   Wt 61 2 kg (135 lb)   SpO2 100%   BMI 23 17 kg/m²   I/O last 3 completed shifts: In: 1100 [IV Piggyback:1100]  Out: 278 [Urine:278]  Lab Results   Component Value Date/Time    BUN 22 12/12/2018 06:00 AM    BUN 32 (H) 12/17/2015 10:00 AM    WBC 13 38 (H) 12/12/2018 07:31 AM    WBC 10 81 (H) 12/23/2015 01:50 PM    HGB 10 5 (L) 12/12/2018 07:31 AM    HGB 12 3 12/23/2015 01:50 PM    HCT 33 3 (L) 12/12/2018 07:31 AM    HCT 38 7 12/23/2015 01:50 PM    MCV 77 (L) 12/12/2018 07:31 AM    MCV 78 (L) 12/23/2015 01:50 PM     (L) 12/12/2018 07:31 AM     12/23/2015 01:50 PM     Temp Readings from Last 3 Encounters:   12/12/18 98 8 °F (37 1 °C) (Oral)   09/11/18 97 8 °F (36 6 °C)   09/20/17 98 1 °F (36 7 °C) (Oral)     Vancomycin Days of Therapy: 2    Assessment/Plan  The patient is currently on vancomycin utilizing scheduled dosing based on actual body weight  Baseline risks associated with therapy include: pre-existing renal impairment  The patient is currently receiving 1250 Q24H and is clinically appropriate and dose will be continued  Pharmacy will also follow closely for s/sx of nephrotoxicity, infusion reactions and appropriateness of therapy  BMP and CBC will be ordered per protocol  Plan for trough as patient approaches steady state, prior to the 4th  dose at approximately 22:00 on 12/14/18  Due to infection severity, will target a trough of 15-20 (appropriate for most indications)   Pharmacy will continue to follow the patients culture results and clinical progress daily      Toño Russo, Pharmacist

## 2018-12-12 NOTE — PROGRESS NOTES
Regarding FL LP- cannot be done in Radiology due to Anticoagulants  Please refer to IR Anticoagulant guidelines

## 2018-12-12 NOTE — PROGRESS NOTES
Pt refusing Prografl  Became combative when attempted  [place] aware  Will continue monitor

## 2018-12-12 NOTE — ASSESSMENT & PLAN NOTE
Unclear etiology  Due to his immunocompromised status he will need to be evaluated for meningitis, encephalitis  He was started empirically on with acyclovir, ampicillin, ceftriaxone, vancomycin  Due to agitation and severe encephalopathy he will require IR with anesthesia to obtain a spinal fluid sample for culture and PCR  IR has been consulted  Blood cultures drawn, currently pending, await results  Obtain MRI  Continue to trend lactate  Continue IV fluids  Consult with Infectious Disease and Neurology

## 2018-12-12 NOTE — PLAN OF CARE
Problem: OCCUPATIONAL THERAPY ADULT  Goal: Performs self-care activities at highest level of function for planned discharge setting  See evaluation for individualized goals  Treatment Interventions: ADL retraining, Functional transfer training, Endurance training, Cognitive reorientation, Patient/family training, Equipment evaluation/education, Compensatory technique education, Continued evaluation, Energy conservation, Activityengagement          See flowsheet documentation for full assessment, interventions and recommendations  Limitation: Decreased ADL status, Decreased Safe judgement during ADL, Decreased cognition, Decreased endurance, Decreased self-care trans, Decreased high-level ADLs, Decreased fine motor control, Mood limitation  Prognosis: Fair  Assessment: Pt is a 77* YO  Male admitted to [facility] on 12/11/18 w/ AMS and flu like symptoms  Comorbidities include a h/o CARLOTTA, liver transplant, cirrhosis, hep C, cerebral artery aneurysm, DM, and subdural hygroma    Pt with active OT orders and up with assistance  orders  Pt resides in a multi level home with family  Pt was I w/  ADLS and IADLS, drove, & required no use of DME PTA  Currently pt is Max A for bed mobility, Min A for transfers and side steps, and max a for LB dressing/bathing  Pt is limited at this time 2*: endurance, activity tolerance, functional mobility, balance, trunk control, functional standing tolerance, unsupportive home environment, decreased I w/ ADLS/IADLS, cognitive impairments, decreased safety awareness, decreased insight into deficits and medical status  The following Occupational Performance Areas to address include: grooming, bathing/shower, toilet hygiene, dressing, health maintenance, functional mobility, community mobility, clothing management, household maintenance and care of children  Pt scored overall  45/100 on the Barthel Index   Based on the aforementioned OT evaluation, functional performance deficits, and assessments, pt has been identified as a high complexity evaluation  From OT standpoint, anticipate d/c home with family support pending progress  Pt to continue to benefit from acute immediate OT services to address the following goals 3-5x/week to  w/in 10-14 days:        OT Discharge Recommendation: Home with family support (pending progress )  OT - OK to Discharge: No      Comments: To [place], MS, OTR/L

## 2018-12-12 NOTE — PLAN OF CARE
DISCHARGE PLANNING     Discharge to home or other facility with appropriate resources Progressing        INFECTION - ADULT     Absence or prevention of progression during hospitalization Progressing        Knowledge Deficit     Patient/family/caregiver demonstrates understanding of disease process, treatment plan, medications, and discharge instructions Progressing        PAIN - ADULT     Verbalizes/displays adequate comfort level or baseline comfort level Progressing        Potential for Falls     Patient will remain free of falls Progressing        Prexisting or High Potential for Compromised Skin Integrity     Skin integrity is maintained or improved Progressing        SAFETY ADULT     Maintain or return to baseline ADL function Progressing     Maintain or return mobility status to optimal level Progressing        SAFETY,RESTRAINT: NV/NON-SELF DESTRUCTIVE BEHAVIOR     Remains free of harm/injury (restraint for non violent/non self-detsructive behavior) Progressing     Returns to optimal restraint-free functioning Progressing

## 2018-12-12 NOTE — PHYSICAL THERAPY NOTE
Physical Therapy Evaluation     Patient's Name: Idania Cummins    Admitting Diagnosis  Dehydration [E86 0]  Lactate blood increase [R79 89]  Acute encephalopathy [G93 40]  Flu-like symptoms [R68 89]    Problem List  Patient Active Problem List   Diagnosis    CVA [facility]cere[facility] vascular accident) ([facility] )    Controlled diabetes mellitus with diabetic neuropathy, with long-term current use of insulin ([facility] )    Liver replaced by transplant ([facility] )    History of immunosuppression therapy    History of hepatitis C    CKD (chronic kidney disease) stage 3, GFR 30-59 ml/min (HCC)    Thrombocytopenia (HCC)    Congenital anomaly of accessory auricle    Cerebral arterial aneurysm    Erectile dysfunction of non-organic origin    Headache, chronic daily    Insomnia    Migraine headache    Occipital neuralgia    Other cirrhosis of liver ([facility]    Peripheral neuropathy    Prurigo nodularis    Pruritus    Spondylosis of cervical region without myelopathy or radiculopathy    Vertigo    Vitamin D deficiency    Medicare annual wellness visit, subsequent    Screening for colon cancer    Hepatitis B core antibody positive    Nasal lesion    Rash    Personal history of transient ischemic attack (TIA), and cerebral infarction without residual deficits    Encephalopathy acute    Sepsis ([facility] )    Acute renal failure superimposed on stage 3 chronic kidney disease ([facility] )    Hyponatremia       Past Medical History  Past Medical History:   Diagnosis Date    Alcoholism ([place] [id number] )     Cerebral artery aneurysm     Change in mental state     last assessed 5/18/15; resolved 10/27/15    Diabetes mellitus ([facility]     Drug dependence ([facility] )     Fatigue     last assessed 1/26/15; resolved 5/24/16    Hepatitis C     Kidney disease     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[facility] )     [facility] transplant recipient [facility])     Renal disorder     Subdural hygroma     2/27/14; resolved 7/28/15    Thrombocytopenia ([facility] ) 9/20/2017       Past Surgical History  Past Surgical History:   Procedure Laterality Date    BRAIN SURGERY  02/12/2014    left frontotemporal 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    CATARACT EXTRACTION      CHOLECYSTECTOMY      LIVER TRANSPLANTATION      ROTATOR CUFF REPAIR      SHOULDER SURGERY          12/12/18 0944   Note Type   Note type Eval only   Pain Assessment   Pain Assessment No/denies pain   Pain Score No Pain   Home Living   Type of Home House  (Pt reports 0 steps )   Home Layout One level   Home Equipment (None)   Additional Comments Pt is questionable historian throughout assessment  Pt reports he lives in a single level home with family  He reports 0 DAWN and no use of AD at baseline  Prior Function   Level of [place] Independent with ADLs and functional mobility   Lives With [facility] Help From Family   ADL Assistance Independent   IADLs Independent   Falls in the last 6 months 0   Comments Pt is questionable historian   Restrictions/Precautions   Weight Bearing Precautions Per Order No   Other Precautions Bed Alarm; Restraints;Cognitive;Multiple lines;Telemetry; Fall Risk   General   Family/Caregiver Present No   Cognition   Overall Cognitive Status Impaired   Arousal/Participation Cooperative   Orientation Level Disoriented to place; Disoriented to time;Disoriented to situation;Oriented to person  (Pt responds to name)   Following Commands Follows one step commands with increased time or repetition   RLE Assessment   RLE Assessment WFL   LLE Assessment   LLE Assessment WFL   Bed Mobility   Rolling R 4  Minimal assistance   Additional items Assist x 1; Increased time required;Verbal cues   Rolling L 4  Minimal assistance   Additional items Assist x 1; Increased time required;Verbal cues   Supine to Sit 2  Maximal assistance   Additional items Assist x 1   Sit to Supine 2  Maximal assistance   Additional items Assist x 1; Increased time required;Verbal cues   Transfers   Sit to Stand 4  Minimal assistance   Additional items Assist x 2; Increased time required; Impulsive;Verbal cues   Stand to Sit 4  Minimal assistance   Additional items Assist x 2; Increased time required; Impulsive;Verbal cues   Ambulation/Elevation   Gait pattern R Knee Elma;L Knee Elma; Improper Weight shift;Decreased foot clearance; Short stride; Step to;Excessively slow   Gait Assistance 4  Minimal assist   Additional items Assist x 2;Verbal cues; Tactile cues   Assistive Device (Hand held assist)   Distance x6 side steps   Balance   Static Sitting Fair +   Dynamic Sitting Fair +   Static Standing Fair -   Dynamic Standing Poor +   Ambulatory Poor +   Endurance Deficit   Endurance Deficit Yes   Endurance Deficit Description Fatigue   Activity Tolerance   Activity Tolerance Patient tolerated treatment well;Patient limited by fatigue   Medical Staff [facility]   Nurse Made Aware Yes, Vanda   Assessment   Prognosis Good   Problem List Decreased endurance; Impaired balance;Decreased mobility; Decreased coordination;Decreased cognition; Impaired judgement;Decreased safety awareness   Assessment Pt is a 78 yo male presenting [facility]18 with AMS, diagnosed with encephalopathy  PMH is significant for: alcoholism, cerebral artery aneurysm, DM, Hep C, kidney disease, Laennec's cirrhosis, s/p liver transplant, thrombocytopenia  Pt is supine at start of session and agreeable to therapy, reporting "I need to use the bathroom " Pt rolled R<> L with Cash and use of bed rails  Pt transferred supine <> sit with maxA x1  Pt transferred sit <> stand with with Cash x2  Pt took x6 side steps with Cash x2 for safety  Pt presents with B/L knee buckle and retropulsion  Pt remained supine in bed with 4 point restraints in place and all needs within reach, PCA at bedside  PT to recommend inpt rehab vs home with home PT pending progress   PT to follow up   Barriers to Discharge Decreased caregiver support  (Unclear level of support at home)   Goals   Patient Goals To go to the bathroom   STG Expiration Date 12/22/18   Short Term Goal #1 1  Pt will transfer supine <> sit with supervision in 1-3 sessions  2  Pt will transfer sit <> stand with supervision and LRAD in 1-3 sessions  3  Pt will ambulate 40 ft with Cash and LRAD in 3-5 sessions  4  Pt will perform LE ther-ex program in 1-3 sessions  Treatment Day 0   Plan   Treatment/Interventions Functional transfer training;LE strengthening/ROM; Endurance training; Therapeutic exercise; Bed mobility;Gait training;Spoke to nursing;OT   PT Frequency (3-5x/wk)   Recommendation   Recommendation Home PT;Post acute IP rehab  (HHPT vs inpt rehab pending progress)   Equipment Recommended (Continue to assess)   PT - OK to Discharge No  (HHPT vs rehab)   Modified Raj Scale   Modified Raj Scale 4   Barthel Index   Feeding 5   Bathing 0   Grooming Score 5   Dressing Score 5   Bladder Score 10   Bowels Score 10   Toilet Use Score 5   Transfers (Bed/Chair) Score 5   Mobility (Level Surface) Score 0   Stairs Score 0   Barthel Index Score 45         Nish Angel, PT, DPT

## 2018-12-12 NOTE — ASSESSMENT & PLAN NOTE
Continue with tacrolimus level  Continue Prograf 1 mg Q 12    LFTs are stable  Consult Gastroenterology for further recommendation in terms of immunosuppression

## 2018-12-13 LAB
ANION GAP SERPL CALCULATED.3IONS-SCNC: 10 MMOL/L (ref 4–13)
BASOPHILS # BLD AUTO: 0.03 THOUSANDS/ΜL (ref 0–0.1)
BASOPHILS NFR BLD AUTO: 0 % (ref 0–1)
BUN SERPL-MCNC: 20 MG/DL (ref 5–25)
CALCIUM SERPL-MCNC: 8.6 MG/DL (ref 8.3–10.1)
CHLORIDE SERPL-SCNC: 103 MMOL/L (ref 100–108)
CO2 SERPL-SCNC: 22 MMOL/L (ref 21–32)
CREAT SERPL-MCNC: 1.51 MG/DL (ref 0.6–1.3)
CRYPTOC AG TITR SER LA: NEGATIVE {TITER}
EOSINOPHIL # BLD AUTO: 0.02 THOUSAND/ΜL (ref 0–0.61)
EOSINOPHIL NFR BLD AUTO: 0 % (ref 0–6)
ERYTHROCYTE [DISTWIDTH] IN BLOOD BY AUTOMATED COUNT: 14.4 % (ref 11.6–15.1)
GFR SERPL CREATININE-BSD FRML MDRD: 46 ML/MIN/1.73SQ M
GLUCOSE SERPL-MCNC: 127 MG/DL (ref 65–140)
GLUCOSE SERPL-MCNC: 62 MG/DL (ref 65–140)
GLUCOSE SERPL-MCNC: 72 MG/DL (ref 65–140)
GLUCOSE SERPL-MCNC: 77 MG/DL (ref 65–140)
GLUCOSE SERPL-MCNC: 83 MG/DL (ref 65–140)
HCT VFR BLD AUTO: 34.5 % (ref 36.5–49.3)
HGB BLD-MCNC: 10.7 G/DL (ref 12–17)
IMM GRANULOCYTES # BLD AUTO: 0.03 THOUSAND/UL (ref 0–0.2)
IMM GRANULOCYTES NFR BLD AUTO: 0 % (ref 0–2)
LACTATE SERPL-SCNC: 1.1 MMOL/L (ref 0.5–2)
LYMPHOCYTES # BLD AUTO: 2.3 THOUSANDS/ΜL (ref 0.6–4.47)
LYMPHOCYTES NFR BLD AUTO: 19 % (ref 14–44)
MAGNESIUM SERPL-MCNC: 1.5 MG/DL (ref 1.6–2.6)
MCH RBC QN AUTO: 23.9 PG (ref 26.8–34.3)
MCHC RBC AUTO-ENTMCNC: 31 G/DL (ref 31.4–37.4)
MCV RBC AUTO: 77 FL (ref 82–98)
MONOCYTES # BLD AUTO: 0.89 THOUSAND/ΜL (ref 0.17–1.22)
MONOCYTES NFR BLD AUTO: 7 % (ref 4–12)
NEUTROPHILS # BLD AUTO: 9.02 THOUSANDS/ΜL (ref 1.85–7.62)
NEUTS SEG NFR BLD AUTO: 74 % (ref 43–75)
NRBC BLD AUTO-RTO: 0 /100 WBCS
PHOSPHATE SERPL-MCNC: 3 MG/DL (ref 2.3–4.1)
PLATELET # BLD AUTO: 131 THOUSANDS/UL (ref 149–390)
PMV BLD AUTO: 12.4 FL (ref 8.9–12.7)
POTASSIUM SERPL-SCNC: 3.1 MMOL/L (ref 3.5–5.3)
PROCALCITONIN SERPL-MCNC: 0.22 NG/ML
RBC # BLD AUTO: 4.48 MILLION/UL (ref 3.88–5.62)
SODIUM SERPL-SCNC: 135 MMOL/L (ref 136–145)
TACROLIMUS BLD-MCNC: 3.7 NG/ML (ref 2–20)
WBC # BLD AUTO: 12.29 THOUSAND/UL (ref 4.31–10.16)

## 2018-12-13 PROCEDURE — 84145 PROCALCITONIN (PCT): CPT | Performed by: INTERNAL MEDICINE

## 2018-12-13 PROCEDURE — 83605 ASSAY OF LACTIC ACID: CPT | Performed by: PHYSICIAN ASSISTANT

## 2018-12-13 PROCEDURE — G8987 SELF CARE CURRENT STATUS: HCPCS

## 2018-12-13 PROCEDURE — G8988 SELF CARE GOAL STATUS: HCPCS

## 2018-12-13 PROCEDURE — 85025 COMPLETE CBC W/AUTO DIFF WBC: CPT | Performed by: INTERNAL MEDICINE

## 2018-12-13 PROCEDURE — G8989 SELF CARE D/C STATUS: HCPCS

## 2018-12-13 PROCEDURE — 97535 SELF CARE MNGMENT TRAINING: CPT

## 2018-12-13 PROCEDURE — 99254 IP/OBS CNSLTJ NEW/EST MOD 60: CPT | Performed by: INTERNAL MEDICINE

## 2018-12-13 PROCEDURE — 83735 ASSAY OF MAGNESIUM: CPT | Performed by: INTERNAL MEDICINE

## 2018-12-13 PROCEDURE — 80048 BASIC METABOLIC PNL TOTAL CA: CPT | Performed by: INTERNAL MEDICINE

## 2018-12-13 PROCEDURE — 99232 SBSQ HOSP IP/OBS MODERATE 35: CPT | Performed by: INTERNAL MEDICINE

## 2018-12-13 PROCEDURE — 99232 SBSQ HOSP IP/OBS MODERATE 35: CPT | Performed by: PSYCHIATRY & NEUROLOGY

## 2018-12-13 PROCEDURE — 82948 REAGENT STRIP/BLOOD GLUCOSE: CPT

## 2018-12-13 PROCEDURE — 84100 ASSAY OF PHOSPHORUS: CPT | Performed by: INTERNAL MEDICINE

## 2018-12-13 PROCEDURE — 97116 GAIT TRAINING THERAPY: CPT

## 2018-12-13 RX ORDER — POTASSIUM CHLORIDE 20 MEQ/1
40 TABLET, EXTENDED RELEASE ORAL ONCE
Status: COMPLETED | OUTPATIENT
Start: 2018-12-13 | End: 2018-12-13

## 2018-12-13 RX ORDER — MAGNESIUM SULFATE HEPTAHYDRATE 40 MG/ML
2 INJECTION, SOLUTION INTRAVENOUS ONCE
Status: COMPLETED | OUTPATIENT
Start: 2018-12-13 | End: 2018-12-14

## 2018-12-13 RX ORDER — SODIUM CHLORIDE, SODIUM GLUCONATE, SODIUM ACETATE, POTASSIUM CHLORIDE, MAGNESIUM CHLORIDE, SODIUM PHOSPHATE, DIBASIC, AND POTASSIUM PHOSPHATE .53; .5; .37; .037; .03; .012; .00082 G/100ML; G/100ML; G/100ML; G/100ML; G/100ML; G/100ML; G/100ML
75 INJECTION, SOLUTION INTRAVENOUS CONTINUOUS
Status: DISCONTINUED | OUTPATIENT
Start: 2018-12-13 | End: 2018-12-17

## 2018-12-13 RX ORDER — POTASSIUM CHLORIDE 20 MEQ/1
40 TABLET, EXTENDED RELEASE ORAL ONCE
Status: DISCONTINUED | OUTPATIENT
Start: 2018-12-13 | End: 2018-12-13

## 2018-12-13 RX ORDER — DEXTROSE MONOHYDRATE 25 G/50ML
25 INJECTION, SOLUTION INTRAVENOUS ONCE
Status: COMPLETED | OUTPATIENT
Start: 2018-12-13 | End: 2018-12-13

## 2018-12-13 RX ORDER — INSULIN GLARGINE 100 [IU]/ML
8 INJECTION, SOLUTION SUBCUTANEOUS
Status: DISCONTINUED | OUTPATIENT
Start: 2018-12-13 | End: 2018-12-14

## 2018-12-13 RX ORDER — ACETAMINOPHEN 325 MG/1
975 TABLET ORAL ONCE
Status: COMPLETED | OUTPATIENT
Start: 2018-12-13 | End: 2018-12-13

## 2018-12-13 RX ORDER — DEXTROSE MONOHYDRATE 25 G/50ML
INJECTION, SOLUTION INTRAVENOUS
Status: COMPLETED
Start: 2018-12-13 | End: 2018-12-13

## 2018-12-13 RX ORDER — LORAZEPAM 2 MG/ML
0.5 INJECTION INTRAMUSCULAR ONCE
Status: COMPLETED | OUTPATIENT
Start: 2018-12-13 | End: 2018-12-13

## 2018-12-13 RX ADMIN — TACROLIMUS 1 MG: 1 CAPSULE, GELATIN COATED ORAL at 10:58

## 2018-12-13 RX ADMIN — INSULIN GLARGINE 8 UNITS: 100 INJECTION, SOLUTION SUBCUTANEOUS at 21:58

## 2018-12-13 RX ADMIN — TACROLIMUS 1 MG: 1 CAPSULE, GELATIN COATED ORAL at 21:58

## 2018-12-13 RX ADMIN — ACETAMINOPHEN 975 MG: 325 TABLET, FILM COATED ORAL at 10:56

## 2018-12-13 RX ADMIN — HEPARIN SODIUM 5000 UNITS: 5000 INJECTION INTRAVENOUS; SUBCUTANEOUS at 16:13

## 2018-12-13 RX ADMIN — AMPICILLIN SODIUM 2000 MG: 2 INJECTION, POWDER, FOR SOLUTION INTRAMUSCULAR; INTRAVENOUS at 22:53

## 2018-12-13 RX ADMIN — SODIUM CHLORIDE, SODIUM GLUCONATE, SODIUM ACETATE, POTASSIUM CHLORIDE, MAGNESIUM CHLORIDE, SODIUM PHOSPHATE, DIBASIC, AND POTASSIUM PHOSPHATE 100 ML/HR: .53; .5; .37; .037; .03; .012; .00082 INJECTION, SOLUTION INTRAVENOUS at 16:45

## 2018-12-13 RX ADMIN — AMPICILLIN SODIUM 2000 MG: 2 INJECTION, POWDER, FOR SOLUTION INTRAMUSCULAR; INTRAVENOUS at 02:07

## 2018-12-13 RX ADMIN — AMPICILLIN SODIUM 2000 MG: 2 INJECTION, POWDER, FOR SOLUTION INTRAMUSCULAR; INTRAVENOUS at 11:01

## 2018-12-13 RX ADMIN — LORAZEPAM 0.5 MG: 2 INJECTION INTRAMUSCULAR; INTRAVENOUS at 17:31

## 2018-12-13 RX ADMIN — DEXTROSE 50 % IN WATER (D50W) INTRAVENOUS SYRINGE 25 ML: at 06:54

## 2018-12-13 RX ADMIN — AMPICILLIN SODIUM 2000 MG: 2 INJECTION, POWDER, FOR SOLUTION INTRAMUSCULAR; INTRAVENOUS at 16:14

## 2018-12-13 RX ADMIN — ACYCLOVIR SODIUM 600 MG: 50 INJECTION, SOLUTION INTRAVENOUS at 05:27

## 2018-12-13 RX ADMIN — HEPARIN SODIUM 5000 UNITS: 5000 INJECTION INTRAVENOUS; SUBCUTANEOUS at 21:58

## 2018-12-13 RX ADMIN — MAGNESIUM SULFATE IN WATER 2 G: 40 INJECTION, SOLUTION INTRAVENOUS at 10:56

## 2018-12-13 RX ADMIN — DEXTROSE MONOHYDRATE 25 ML: 25 INJECTION, SOLUTION INTRAVENOUS at 06:54

## 2018-12-13 RX ADMIN — HEPARIN SODIUM 5000 UNITS: 5000 INJECTION INTRAVENOUS; SUBCUTANEOUS at 05:27

## 2018-12-13 RX ADMIN — ACYCLOVIR SODIUM 600 MG: 50 INJECTION, SOLUTION INTRAVENOUS at 19:17

## 2018-12-13 RX ADMIN — CEFTRIAXONE SODIUM 2000 MG: 10 INJECTION, POWDER, FOR SOLUTION INTRAVENOUS at 12:50

## 2018-12-13 RX ADMIN — POTASSIUM CHLORIDE 40 MEQ: 1500 TABLET, EXTENDED RELEASE ORAL at 10:53

## 2018-12-13 NOTE — PROGRESS NOTES
Progress Note - Infectious Disease   Reynaldo Cannon 77 y o  male MRN: [id number]  Unit/Bed#: [facility] 807-01 Encounter: [id number]      Impression/Plan:  1  Fever and encephalopathy-unclear etiology  Certainly with associated headache, and in his immunosuppressed state, 1 must consider the possibility of an acute CNS infection such as meningitis or encephalitis  However, the symptomatology seems to be markedly improved within well less than 24 hr which would make these CNS infections less likely  Consideration for the possibility of encephalopathy associated with another infectious etiology such as a occult bacterial infection or influenza  Consideration for the possibility of medication effect  Fortunately, the patient remains hemodynamically stable and nontoxic despite his systemic illness  He seems to be tolerating the antibiotics well without difficulty  -continue vancomycin, ampicillin, ceftriaxone, and acyclovir for now  -aggressive hydration to decrease the risk of acyclovir nephrotoxicity  -await lumbar puncture  -close neurology follow-up  -recheck CBC with diff, and creatinine tomorrow  -follow-up blood cultures  -check vancomycin trough tomorrow if patient still needs vancomycin  -pharmacy consult for vancomycin trough management     2  Sepsis-POA  Fever and tachycardia  Fortunately the patient remains hemodynamically stable and nontoxic despite the systemic illness  Unclear etiology at this point  Procalcitonin level is repeatedly normal  -antibiotics as above  -follow-up blood cultures  -workup as above  -supportive care     3  Acute kidney injury-the setting of chronic kidney disease  Suspected pre renal issue  The renal function has improved with IV hydration and has now stabilized  -monitor the GFR  -dose adjusted antibiotics as above  -for volume management        4   Liver transplantation-on immunosuppressive therapy        Discussed in detail with the primary service and Neurology    Antibiotics:  Ceftriaxone 3  Ampicillin 3  Vancomycin 3  Acyclovir 3    Subjective:  Patient has no fever, but still having intermittent chills; no nausea, vomiting, diarrhea; no cough, shortness of breath; no pain  No new symptoms  He is having headache once again  Objective:  Vitals:  Temp:  [98 °F (36 7 °C)-99 6 °F (37 6 °C)] 98 3 °F (36 8 °C)  HR:  [75-97] 77  Resp:  [12-18] 16  BP: (125-152)/(62-80) 125/76  SpO2:  [97 %-100 %] 98 %  Temp (24hrs), Av 7 °F (37 1 °C), Min:98 °F (36 7 °C), Max:99 6 °F (37 6 °C)  Current: Temperature: 98 3 °F (36 8 °C)    Physical Exam:   General Appearance:  Alert, interactive, nontoxic, no acute distress  Throat: Oropharynx moist without lesions  Lungs:   Clear to auscultation bilaterally; no wheezes, rhonchi or rales; respirations unlabored   Heart:  RRR; no murmur, rub or gallop   Abdomen:   Soft, non-tender, non-distended, positive bowel sounds  Extremities: No clubbing, cyanosis or edema   Skin: No new rashes or lesions  No draining wounds noted  Labs, Imaging, & Other studies:   All pertinent labs and imaging studies were personally reviewed    Results from last 7 days  Lab Units 18  0456 18  0731 18  2212 18  1413   WBC Thousand/uL 12 29* 13 38*  --  10 44*   HEMOGLOBIN g/dL 10 7* 10 5*  --  12 2   PLATELETS Thousands/uL 131* 113* 122* 124*       Results from last 7 days  Lab Units 18  0456 18  0600 18  1413   SODIUM mmol/L 135* 137 126*   POTASSIUM mmol/L 3 1* 3 9 4 3   CHLORIDE mmol/L 103 103 94*   CO2 mmol/L 22 25 22   BUN mg/dL 20 22 24   CREATININE mg/dL 1 51* 1 51* 2 00*   EGFR ml/min/1 73sq m 46 46 33   CALCIUM mg/dL 8 6 7 7* 9 8   AST U/L  --  42 34   ALT U/L  --  27 29   ALK PHOS U/L  --  122* 155*       Results from last 7 days  Lab Units 18  1222 18  1414   BLOOD CULTURE   --  No Growth at 24 hrs     INFLUENZA B PCR  None Detected  --    RSV PCR  None Detected  -- Procalcitonin level 0 22    Cryptococcal antigen negative

## 2018-12-13 NOTE — ASSESSMENT & PLAN NOTE
Unclear etiology, possibly multifactorial related to acute kidney injury and hyponatremia together with possible infection cause such as meningitis  Patient was started on acyclovir, ampicillin, Rocephin, vancomycin  Encephalopathy is currently resolved  Discussed with interventional Radiology, patient for lumbar puncture tomorrow morning  Will keep NPO after midnight  Appreciated Infectious Disease recommendation, no antibiotic changes today  Appreciated neurology recommendations, awaiting lumbar puncture

## 2018-12-13 NOTE — PROGRESS NOTES
Vancomycin IV Pharmacy-to-Dose Consultation    Trudi Whitehead is a 77 y o  male who is currently receiving Vancomycin IV with management by the Pharmacy Consult service  Assessment/Plan:  The patient was reviewed  Renal function is stable and no signs or symptoms of nephrotoxicity and/or infusion reactions were documented in the chart  Based on todays assessment, continue current vancomycin (day # 3 dosing of 1250mg q24h, with a plan for trough to be drawn at 2200 on 12/14/18  We will continue to follow the patients culture results and clinical progress daily      Adam Bernabe, Pharmacist

## 2018-12-13 NOTE — ASSESSMENT & PLAN NOTE
Continue Prograf 1 mg Q 12    LFTs are stable  Discussed with Gastroenterology, continue immunosuppressant as per home dose, tacrolimus level therapeutic

## 2018-12-13 NOTE — PHYSICAL THERAPY NOTE
PHYSICAL THERAPY NOTE          Patient Name: Dimas Diallo  JZACM'V Date: 12/13/2018 12/13/18 1306   Pain Assessment   Pain Assessment No/denies pain   Pain Score No Pain   Restrictions/Precautions   Weight Bearing Precautions Per Order No   Other Precautions Cognitive; Fall Risk;Multiple lines   General   Chart Reviewed Yes   Response to Previous Treatment Patient with no complaints from previous session  Family/Caregiver Present No   Cognition   Overall Cognitive Status Impaired   Arousal/Participation Alert; Cooperative   Attention Attends with cues to redirect   Orientation Level Oriented to person;Oriented to place; Disoriented to time;Disoriented to situation   Following Commands Follows one step commands with increased time or repetition   Subjective   Subjective "I want to go home!"   Bed Mobility   Supine to Sit 7  Independent   Sit to Supine 7  Independent   Transfers   Sit to Stand 7  Independent   Stand to Sit 7  Independent   Ambulation/Elevation   Gait pattern Improper Weight shift; Excessively slow   Gait Assistance 7  Independent   Assistive Device None   Distance 200 ft   Stair Management Assistance 6  Modified independent   Additional items Verbal cues   Stair Management Technique One rail L   Number of Stairs 2   Balance   Static Sitting Good   Dynamic Sitting Good   Static Standing Fair +   Dynamic Standing Fair +   Ambulatory Fair +   Endurance Deficit   Endurance Deficit No   Activity Tolerance   Activity Tolerance Patient tolerated treatment well   Medical Staff Made Aware [facility]   Nurse Made Aware Yes, Radha   Assessment   Prognosis Good   Assessment Pt seen for physical therapy treatment session focused on gait training  Pt is supine at start of session and agreeable to therapy  Pt transferred supine <> sit independently  Pt transferred sit <> stand independently  Pt ambulated 200 ft independently and without LOB   Pt navigated x2 stairs with modified independence and use of railing on the left  Pt remained supine in bed with all needs within reach at end of session  Per CM, pt has 24/7 supervision at home  Pt is safe to D/C home with family assistance and home PT  Pt has no further acute PT needs at this time, PT to sign off  Please re-consult if medically necessary  Barriers to Discharge None  (Pt has 24/7 supervision at home)   Goals   Patient Goals To go home   Treatment Day 1   Plan   Treatment/Interventions (D/C PT)   Progress Discontinue PT  (Pt is independent with functional mobility)   PT Frequency (D/C PT)   Recommendation   Recommendation Home PT; Home with family support   Equipment Recommended (None)   PT - OK to Discharge Yes  (with family support and home PT)     Debbi Perez, PT, DPT

## 2018-12-13 NOTE — PLAN OF CARE
Problem: PHYSICAL THERAPY ADULT  Goal: Performs mobility at highest level of function for planned discharge setting  See evaluation for individualized goals  Treatment/Interventions: Functional transfer training, LE strengthening/ROM, Endurance training, Therapeutic exercise, Bed mobility, Gait training, Spoke to nursing, OT  Equipment Recommended:  (Continue to assess)       See flowsheet documentation for full assessment, interventions and recommendations  Outcome: Completed Date Met: 12/13/18  Prognosis: Good  Problem List: Decreased endurance, Impaired balance, Decreased mobility, Decreased coordination, Decreased cognition, Impaired judgement, Decreased safety awareness  Assessment: Pt seen for physical therapy treatment session focused on gait training  Pt is supine at start of session and agreeable to therapy  Pt transferred supine <> sit independently  Pt transferred sit <> stand independently  Pt ambulated 200 ft independently and without LOB  Pt navigated x2 stairs with modified independence and use of railing on the left  Pt remained supine in bed with all needs within reach at end of session  Per CM, pt has 24/7 supervision at home  Pt is safe to D/C home with family assistance and home PT  Pt has no further acute PT needs at this time, PT to sign off  Please re-consult if medically necessary  Barriers to Discharge: None (Pt has 24/7 supervision at home)     Recommendation: (S) Home PT, Home with family support     PT - OK to Discharge: (S) Yes    See flowsheet documentation for full assessment         Comments: Violet Mcintyre, PT, DPT

## 2018-12-13 NOTE — SOCIAL WORK
Cm reviewed patient during care coordination rounds with Dr Nelson Stage  Patient not stable for discharge today  Patient to go to IR for lumbar puncture today  PT/OT recommending home with home therapy and family support once stable for discharge if patient has 24/7 care at home and pending DAWN  Cm spoke with family at bedside to confirm if patient has 24/7 care in the home and family confirmed patient's wife and dtr are home to help  Cm left voicemail for patient's [facility] [phone] to confirm this as well  Awaiting return call and Cm to relay information to PT/OT for final determination  Cm continues to follow and work on patient's discharge plan

## 2018-12-13 NOTE — ASSESSMENT & PLAN NOTE
Baseline creatinine 1 6-1 7    Presented with creatinine about 2, acute kidney injury currently resolved  Will resume IV fluids as per Infectious Disease recommendation as patient remains on IV acyclovir, Isolyte for now 100 mL an hour  BMP in the morning

## 2018-12-13 NOTE — PROGRESS NOTES
Progress Note - Pamela Cantu 1950, 77 y o  male MRN: [id number]    Unit/Bed#: [facility] 807-01 Encounter: [id number]    Primary Care Provider: Snoy Angel DO   Date and time admitted to hospital: 12/11/2018  1:28 PM        Hyponatremia   Assessment & Plan    Likely related vomiting and hypovolemia  Sodium today borderline low at 135     Acute renal failure superimposed on stage 3 chronic kidney disease ([facility] )   Assessment & Plan    Baseline creatinine 1 6-1 7  Presented with creatinine about 2, acute kidney injury currently resolved  Will resume IV fluids as per Infectious Disease recommendation as patient remains on IV acyclovir, Isolyte for now 100 mL an hour  BMP in the morning     Sepsis ([facility] [address] )   Assessment & Plan    Resolving, no longer febrile, WBC remains elevated, antibiotics as per principal plan     Personal history of transient ischemic attack (TIA), and cerebral infarction without residual deficits   Assessment & Plan    Hold Plavix for lumbar puncture     Liver replaced by transplant [facility])   Assessment & Plan    Continue Prograf 1 mg Q 12    LFTs are stable  Discussed with Gastroenterology, continue immunosuppressant as per home dose, tacrolimus level therapeutic     * Encephalopathy acute   Assessment & Plan    Unclear etiology, possibly multifactorial related to acute kidney injury and hyponatremia together with possible infection cause such as meningitis  Patient was started on acyclovir, ampicillin, Rocephin, vancomycin  Encephalopathy is currently resolved  Discussed with interventional Radiology, patient for lumbar puncture tomorrow morning  Will keep NPO after midnight  Appreciated Infectious Disease recommendation, no antibiotic changes today  Appreciated neurology recommendations, awaiting lumbar puncture         VTE Pharmacologic Prophylaxis: yes  Pharmacologic: Heparin  Mechanical VTE Prophylaxis in Place: Yes    Patient Centered Rounds: I have performed bedside rounds with nursing staff today     Discussions with Specialists or Other Care Team Provider:  Neurology, Infectious Disease, IR    Education and Discussions with Family / Patient:  Patient, left a message for his wife    Time Spent for Care: 45 minutes  More than 50% of total time spent on counseling and coordination of care as described above  Current Length of Stay: 2 day(s)    Current Patient Status: Inpatient   Certification Statement: The patient will continue to require additional inpatient hospital stay due to Please refer to above    Discharge Plan: To be determined    Code Status: Level 1 - Full Code      Subjective:   Feeling well, he has no complaints or concerns at this time, he is hungry and wants to eat  Objective:     Vitals:   Temp (24hrs), Av 3 °F (36 8 °C), Min:97 3 °F (36 3 °C), Max:99 6 °F (37 6 °C)    Temp:  [97 3 °F (36 3 °C)-99 6 °F (37 6 °C)] 98 4 °F (36 9 °C)  HR:  [69-97] 69  Resp:  [12-18] 18  BP: (125-176)/(72-80) 136/76  SpO2:  [95 %-98 %] 95 %  Body mass index is 21 92 kg/m²  Input and Output Summary (last 24 hours): Intake/Output Summary (Last 24 hours) at 18 1548  Last data filed at 18 1324   Gross per 24 hour   Intake              325 ml   Output                0 ml   Net              325 ml       Physical Exam:     Physical Exam   Constitutional: He is oriented to person, place, and time  He appears well-developed  Cardiovascular: Normal rate, regular rhythm and normal heart sounds  Exam reveals no friction rub  No murmur heard  Pulmonary/Chest: Effort normal  No respiratory distress  He has no wheezes  He has no rales  Abdominal: Soft  He exhibits no distension  There is no tenderness  Musculoskeletal: He exhibits no edema  Neurological: He is alert and oriented to person, place, and time  Skin: Skin is warm  Psychiatric: He has a normal mood and affect         Additional Data:     Labs:      Results from last 7 days  Lab Units 18  0456   WBC Thousand/uL 12 29*   HEMOGLOBIN g/dL 10 7*   HEMATOCRIT % 34 5*   PLATELETS Thousands/uL 131*   NEUTROS PCT % 74   LYMPHS PCT % 19   MONOS PCT % 7   EOS PCT % 0       Results from last 7 days  Lab Units 12/13/18  0456 12/12/18  0600   POTASSIUM mmol/L 3 1* 3 9   CHLORIDE mmol/L 103 103   CO2 mmol/L 22 25   BUN mg/dL 20 22   CREATININE mg/dL 1 51* 1 51*   CALCIUM mg/dL 8 6 7 7*   ALK PHOS U/L  --  122*   ALT U/L  --  27   AST U/L  --  42       Results from last 7 days  Lab Units 12/11/18  2046   INR  1 19*       * I Have Reviewed All Lab Data Listed Above  * Additional Pertinent Lab Tests Reviewed: All Labs Within Last 24 Hours Reviewed    Imaging:    Imaging Reports Reviewed Today Include:  None  Imaging Personally Reviewed by Myself Includes:  None    Recent Cultures (last 7 days):       Results from last 7 days  Lab Units 12/12/18  1222 12/12/18  1203 12/11/18  1414   BLOOD CULTURE   --  No Growth at 24 hrs  No Growth at 24 hrs  No Growth at 24 hrs     INFLUENZA B PCR  None Detected  --   --    RSV PCR  None Detected  --   --        Last 24 Hours Medication List:     Current Facility-Administered Medications:  acetaminophen 650 mg Oral Q6H PRN Camille Freitas PA-C    acyclovir 10 mg/kg (Ideal) Intravenous Q12H Qian Alicea MD Last Rate: 600 mg (12/13/18 0527)   ampicillin 2,000 mg Intravenous Q6H Andriy Nance MD Last Rate: 2,000 mg (12/13/18 1101)   cefTRIAXone 2,000 mg Intravenous Q12H Qian Alicea MD Last Rate: 2,000 mg (12/13/18 1250)   heparin (porcine) 5,000 Units Subcutaneous Q8H Jax Rasmussen MD    insulin glargine 8 Units Subcutaneous HS Marina Cagle MD    multi-electrolyte 100 mL/hr Intravenous Continuous Marina Cagle MD    OLANZapine 10 mg Intramuscular Q8H PRN Andriy Nance MD    tacrolimus 1 mg Oral Q12H Jax Rasmussen MD    vancomycin 20 mg/kg Intravenous Q24H Qian Alicea MD Last Rate: 1,250 mg (12/12/18 2201)        Today, Patient Was Seen By: Marina Cagle MD    ** Please Note: Dragon 360 Dictation voice to text software may have been used in the creation of this document   **

## 2018-12-13 NOTE — PROGRESS NOTES
About an hour ago, patient was found to be agitated trying to leave the hospital, trying to remove his IV  At that time, was necessarily to restrain the patient, he was harming himself, removing IV site created escalation in bruising of upper extremities was trying to remove also IV site in the left foot  Four point restraints were applied, patient was given 0 5 mg of Ativan IV and placed again on one-to-one  About 40 minutes later, nurse came to me to reported that wife confessed to the nurse that earlier in the afternoon, she brought to the patient upon request a dose of Ambien and dose of temazepam of undisclosed mg that the patient took  At the time of order for Ativan the medical staff and I WERE ABSOLUTELY UNAWARE OF THIS  Following this conversation room was searched by security as per protocol but no other pills or medications were found  I had long conversation with patient and family, and I repeated myself multiple times that he has inappropriate as incredibly dangerous for them to provide medications to the patient without medical staff or Dr Tamiko Pascual as use receiving active treatment with high-dose of antibiotics and might receive other sedating agent if indicated during the day  This put the patient at risk of drug interactions,  medication side effect including further alteration of mental status, over-sedation, inability to breathe, and even death  Family seemed to understand  Remains on step-down level 2 with close nursing monitoring and one-to-one  For now, the patient remains fully awake, no drowsiness, able to hold conversation  Appears calm  One restrain on the right arm was removed to allow patient to eat dinner  If he continues to comply 4 point restrain will be removed and will be left one-to-one with permission to walk in the hallway with       If depression of respiratory system or over-sedation provided the patient with flumazenil  This was sign out to [facility] on-call for the hospitalist group

## 2018-12-13 NOTE — PLAN OF CARE
Problem: OCCUPATIONAL THERAPY ADULT  Goal: Performs self-care activities at highest level of function for planned discharge setting  See evaluation for individualized goals  Treatment Interventions: ADL retraining, Functional transfer training, Endurance training, Cognitive reorientation, Patient/family training, Equipment evaluation/education, Compensatory technique education, Continued evaluation, Energy conservation, Activityengagement          See flowsheet documentation for full assessment, interventions and recommendations  Outcome: Completed Date Met: 12/13/18  Limitation: Decreased ADL status, Decreased Safe judgement during ADL, Decreased cognition, Decreased endurance, Decreased self-care trans, Decreased high-level ADLs, Decreased fine motor control, Mood limitation  Prognosis: Fair  Assessment: Patient participated in Skilled OT session this date with interventions consisting of ADL re training with the use of correct body mechnaics, safety awareness and fall prevention techniques, therapeutic exercise to: increase functional use of BUEs, increase BUE muscle strength ,  therapeutic activities to: increase activity tolerance, elicit righting and equilibrium reactions for improved postural control and alignment during transitional movements, increase dynamic sit/ stand balance during functional activity , increase postural control, increase trunk control and increase OOB/ sitting tolerance  Patient agreeable to OT treatment session, upon arrival patient was found supine in bed  In comparison to previous session, patient with improvements in cognition, functional mobility, and transfers   Patient requiring ocassional safety reminders  Patient has attained all treatment goals and is now demonstrating ability to complete UB/LB ADLs at independent   Patient was educated on fall safety , patient verbalized understanding and demonstrated recommended plan correctly    Patient appears to be functioning at baseline  No further acute OT needs identified at this time to warrant continuation of services  D/C OT services  From OT standpoint, recommendation at time of d/c would be Home with family support         OT Discharge Recommendation: Home with family support  OT - OK to Discharge: Yes      Comments: Jimmy Kellogg MS, OTR/L

## 2018-12-13 NOTE — OCCUPATIONAL THERAPY NOTE
[address] Progress Note     Patient Name: Noemi Roberson  Today's Date: 12/13/2018  Problem List  Patient Active Problem List   Diagnosis    CVA (cerebral vascular accident) ([facility] )    Controlled diabetes mellitus with diabetic neuropathy, with long-term current use of insulin ([facility] )    Liver replaced by transplant ([facility] )    History of immunosuppression therapy    History of hepatitis C    CKD (chronic kidney disease) stage 3, GFR 30-59 ml/min (HCC)    Thrombocytopenia (HCC)    Congenital anomaly of accessory auricle    Cerebral arterial aneurysm    Erectile dysfunction of non-organic origin    Headache, chronic daily    Insomnia    Migraine headache    Occipital neuralgia    Other cirrhosis of liver (HCC)    Peripheral neuropathy    Prurigo nodularis    Pruritus    Spondylosis of cervical region without myelopathy or radiculopathy    Vertigo    Vitamin D deficiency    Medicare annual wellness visit, subsequent    Screening for colon cancer    Hepatitis B core antibody positive    Nasal lesion    Rash    Personal history of transient ischemic attack (TIA), and cerebral infarction without residual deficits    Encephalopathy acute    [facility]    Acute renal failure superimposed on stage 3 chronic kidney disease ([facility] )    Hyponatremia           12/13/18 1310   Restrictions/Precautions   Weight Bearing Precautions Per Order No   Other Precautions Cognitive; Fall Risk;Multiple lines   General   Response to Previous Treatment Patient with no complaints from previous session   Lifestyle   Autonomy pta pt reports I in ADLS/IADLs/fxn'l mob: no use of DME   Reciprocal Relationships has family   Service to Others retired   [address] enjoys watching tv   Pain Assessment   Pain Assessment No/denies pain   Pain Score No Pain   ADL   Where Assessed Edge of bed   LB Dressing Assistance 5  Supervision/Setup   LB Dressing Deficit Don/doff R sock; Don/doff L sock  (need sba due to IV in foot that was bleeding slightly, RN aw)   Functional Standing Tolerance   Time ~4 minutes   Activity participating in social participation while bed linens were changed   Comments no DME   Bed Mobility   Supine to Sit 6  Modified independent   Additional items Increased time required;HOB elevated   Sit to Supine 6  Modified independent   Additional items Increased time required;HOB elevated   Transfers   Sit to Stand 7  Independent   Stand to Sit 7  Independent   Functional Mobility   Functional Mobility 7  Independent   Additional items (none)   Cognition   Overall Cognitive Status Impaired   Arousal/Participation Alert; Responsive; Cooperative   Attention Attends with cues to redirect   Orientation Level Oriented to person;Oriented to place; Disoriented to time;Disoriented to situation   Memory Decreased recall of precautions;Decreased recall of recent events   Following Commands Follows one step commands with increased time or repetition   Comments pt will have constant support/supervision at home   Activity Tolerance   Activity Tolerance Patient tolerated treatment well   Medical Staff Made Aware CM updated w/ progress   Assessment   Assessment Patient participated in Skilled OT session this date with interventions consisting of ADL re training with the use of correct body mechnaics, safety awareness and fall prevention techniques, therapeutic exercise to: increase functional use of BUEs, increase BUE muscle strength ,  therapeutic activities to: increase activity tolerance, elicit righting and equilibrium reactions for improved postural control and alignment during transitional movements, increase dynamic sit/ stand balance during functional activity , increase postural control, increase trunk control and increase OOB/ sitting tolerance  Patient agreeable to OT treatment session, upon arrival patient was found supine in bed    In comparison to previous session, patient with improvements in cognition, functional mobility, and transfers   Patient requiring ocassional safety reminders  Patient has attained all treatment goals and is now demonstrating ability to complete UB/LB ADLs at independent   Patient was educated on fall safety , patient verbalized understanding and demonstrated recommended plan correctly  Patient appears to be functioning at baseline  No further acute OT needs identified at this time to warrant continuation of services  D/C OT services  From OT standpoint, recommendation at time of d/c would be Home with family support  Plan   Treatment Interventions ADL retraining;Functional transfer training;UE strengthening/ROM; Endurance training;Cognitive reorientation;Patient/family training;Equipment evaluation/education; Compensatory technique education;Continued evaluation; Energy conservation; Activityengagement   Goal Expiration Date 12/27/18   Treatment Day 1   Recommendation   OT Discharge Recommendation Home with family support   OT - OK to Discharge Yes   Barthel Index   Feeding 10   Bathing 5   Grooming Score 5   Dressing Score 5   Bladder Score 10   Bowels Score 10   Toilet Use Score 10   Transfers (Bed/Chair) Score 15   Mobility (Level Surface) Score 10   Stairs Score 10   Barthel Index Score 90   Modified Effie Scale   Modified Raj Scale 1   Leonie Nicolas MS, OTR/L

## 2018-12-13 NOTE — PROGRESS NOTES
NEUROLOGY Daily Progress Note  Service: Neurology  Patient: Adrien Herrera 77 y o  male   MRN: [id number]  PCP: Carmenciat Parker DO  Unit/Bed#: [facility] 807-01 Encounter: [id number]  Date Of Visit: 18    Assessment:    Principal Problem:    Encephalopathy acute  Active Problems:    Liver replaced by transplant [facility])    Personal history of transient ischemic attack (TIA), and cerebral infarction without residual deficits    Sepsis ([facility] )    Acute renal failure superimposed on stage 3 chronic kidney disease ([facility] )    Hyponatremia      Plan:  Acute Encephalopathy - likely infectious/septic, improving clinically, possible component of toxic metabolic  Hx of Left Lacunar CVA no residual weakness  Hx of Left ICA aneurysm s/p clipping  Orthotopic Liver Txp  UP  Medication induced immunosuppression  CARLOTTA improved on CKD 3 1 5-1 7  Chronic Thrombocytopenia    -Continue with plans for LP as ordered under fluoroscopic guidance   -Continue with Antibiotics as ordered Vanco/Ceftriaxone/Ampicillin and Antiviral Acyclovir and appreciate ID recommendations  -LP results to assist guidance and duration of therapy if CNS infection (cell count, gram stain, glucose, viral panel)  -Consider PICC line if CNS infection and requiring IV abx course in setting of poor access  -Tacro Trough unrevealing, dosing per Primary team  -Tylenol PO for headache this am, chronic headache at baseline he describes without alarm features, responds to tylenol at home  -Monitor Neuro Exam  -Follow for fever and trend wbc    Time Spent for Care: 30 minutes  More than 50% of total time spent on counseling and coordination of care as described above  Current Length of Stay: 2 day(s)    Current Patient Status: Inpatient     Code Status: Level 1 - Full Code    Subjective:   No complaints today, "I feel ok" tolerating diet, more interactive, watching TV, afebrile overnight    Off restraints, maintains on 1 to 1    Objective:     Vitals:   Temp (24hrs), Av 7 °F (37 1 °C), Min:98 °F (36 7 °C), Max:99 6 °F (37 6 °C)    Temp:  [98 °F (36 7 °C)-99 6 °F (37 6 °C)] 98 3 °F (36 8 °C)  HR:  [75-97] 77  Resp:  [12-18] 16  BP: (125-152)/(62-80) 125/76  SpO2:  [97 %-100 %] 98 %  Body mass index is 21 92 kg/m²  Input and Output Summary (last 24 hours): Intake/Output Summary (Last 24 hours) at 12/13/18 0955  Last data filed at 12/12/18 2100   Gross per 24 hour   Intake              325 ml   Output              500 ml   Net             -175 ml       Physical Exam:     Physical Exam   Constitutional: He is oriented to person, place, and time  He appears well-developed and well-nourished  No distress  HENT:   Head: Normocephalic and atraumatic  Mouth/Throat: No oropharyngeal exudate  Eyes: Pupils are equal, round, and reactive to light  Conjunctivae and EOM are normal  No scleral icterus  Neck: No JVD present  Cardiovascular: Normal rate, regular rhythm, normal heart sounds and intact distal pulses  No murmur heard  Pulmonary/Chest: Effort normal and breath sounds normal  He has no rales  Abdominal: Soft  Bowel sounds are normal  There is no tenderness  Musculoskeletal: Normal range of motion  He exhibits no edema, tenderness or deformity  Neurological: He is alert and oriented to person, place, and time  He has normal reflexes  No cranial nerve deficit  Skin: Skin is warm and dry  No rash noted  He is not diaphoretic  No erythema  Psychiatric: He has a normal mood and affect   His behavior is normal  Judgment and thought content normal    Negative nuchal rigidity, negative kernigs and brudzinski's    Neurologic Exam   Mental Status:  Oriented to person, place, and situation  Follows commands  Attention and concentration acceptable  Fluent speech  Unable to remember 3 objects, 3 of 3 correct with multiple choice prompting  Cranial Nerves:  CN 2 - 12 intact  Motor Exam:  Muscle bulk: normal  No tremor  Overall muscle tone: normal  Strength: 5/5 bilaterally upper and lower  Motor: normal finger to nose, no pronator drift  Sensory Exam:  Light touch equal bilaterally  Gait, Coordination, and Reflexes   Babinski: downward bilaterally  Coordination: heel to shin in tact    Additional Data:     Labs:      Results from last 7 days  Lab Units 12/13/18  0456   WBC Thousand/uL 12 29*   HEMOGLOBIN g/dL 10 7*   HEMATOCRIT % 34 5*   PLATELETS Thousands/uL 131*   NEUTROS PCT % 74   LYMPHS PCT % 19   MONOS PCT % 7   EOS PCT % 0       Results from last 7 days  Lab Units 12/13/18  0456 12/12/18  0600   POTASSIUM mmol/L 3 1* 3 9   CHLORIDE mmol/L 103 103   CO2 mmol/L 22 25   BUN mg/dL 20 22   CREATININE mg/dL 1 51* 1 51*   CALCIUM mg/dL 8 6 7 7*   ALK PHOS U/L  --  122*   ALT U/L  --  27   AST U/L  --  42       Results from last 7 days  Lab Units 12/11/18  2046   INR  1 19*       * I Have Reviewed All Lab Data Listed Above  * Additional Pertinent Lab Tests Reviewed: [address] Admission Reviewed    Imaging:    Imaging Reports Reviewed Today Include: CXR CT Head from admission      Recent Cultures (last 7 days):       Results from last 7 days  Lab Units 12/12/18  1222 12/11/18  1414   BLOOD CULTURE   --  No Growth at 24 hrs     INFLUENZA B PCR  None Detected  --    RSV PCR  None Detected  --        Last 24 Hours Medication List:     Current Facility-Administered Medications:  acetaminophen 650 mg Oral Q6H PRN Lexis Hill PA-C    acyclovir 10 mg/kg (Ideal) Intravenous Q12H Venetta Mohs, MD Last Rate: 600 mg (12/13/18 0527)   ampicillin 2,000 mg Intravenous Q6H Kye Leach MD Last Rate: 2,000 mg (12/13/18 0207)   cefTRIAXone 2,000 mg Intravenous Q12H Venetta Mohs, MD Last Rate: 2,000 mg (12/12/18 1134)   heparin (porcine) 5,000 Units Subcutaneous [facility] Kye Leach MD    insulin glargine 8 Units Subcutaneous HS Jacinta Cantu MD    magnesium sulfate 2 g Intravenous Once Jacinta Cantu MD    OLANZapine 10 mg Intramuscular Q8H PRN Montserrat Munoz Marques Vasques MD    potassium chloride 40 mEq Oral Once Caro Arzola MD    tacrolimus 1 mg Oral Q12H Trena Velásquez MD    vancomycin 20 mg/kg Intravenous Q24H Harsh Can MD Last Rate: 1,250 mg (12/12/18 2201)        Today, Patient Was Seen By: Joselito Villanueva PA-C    ** Please Note: Dragon 360 Dictation voice to text software may have been used in the creation of this document   **

## 2018-12-13 NOTE — NURSING NOTE
Pt  With blood glucose 62 this morning, asymptomatic  Notified Alex Zelaya with [facility]  Will give dextrose as ordered

## 2018-12-14 ENCOUNTER — APPOINTMENT (INPATIENT)
Dept: RADIOLOGY | Facility: HOSPITAL | Age: 68
DRG: 064 | End: 2018-12-14
Payer: MEDICARE

## 2018-12-14 LAB
ANION GAP SERPL CALCULATED.3IONS-SCNC: 8 MMOL/L (ref 4–13)
APPEARANCE CSF: ABNORMAL
APTT PPP: 37 SECONDS (ref 26–38)
BASOPHILS # BLD AUTO: 0.05 THOUSANDS/ΜL (ref 0–0.1)
BASOPHILS NFR BLD AUTO: 0 % (ref 0–1)
BUN SERPL-MCNC: 16 MG/DL (ref 5–25)
CALCIUM SERPL-MCNC: 8.7 MG/DL (ref 8.3–10.1)
CHLORIDE SERPL-SCNC: 107 MMOL/L (ref 100–108)
CO2 SERPL-SCNC: 23 MMOL/L (ref 21–32)
CREAT SERPL-MCNC: 1.46 MG/DL (ref 0.6–1.3)
EOSINOPHIL # BLD AUTO: 0.24 THOUSAND/ΜL (ref 0–0.61)
EOSINOPHIL NFR BLD AUTO: 2 % (ref 0–6)
EOSINOPHIL NFR CSF MANUAL: 1 %
ERYTHROCYTE [DISTWIDTH] IN BLOOD BY AUTOMATED COUNT: 14.4 % (ref 11.6–15.1)
GFR SERPL CREATININE-BSD FRML MDRD: 48 ML/MIN/1.73SQ M
GLUCOSE CSF-MCNC: 48 MG/DL (ref 50–80)
GLUCOSE SERPL-MCNC: 102 MG/DL (ref 65–140)
GLUCOSE SERPL-MCNC: 148 MG/DL (ref 65–140)
GLUCOSE SERPL-MCNC: 164 MG/DL (ref 65–140)
GLUCOSE SERPL-MCNC: 55 MG/DL (ref 65–140)
GLUCOSE SERPL-MCNC: 62 MG/DL (ref 65–140)
GLUCOSE SERPL-MCNC: 69 MG/DL (ref 65–140)
GLUCOSE SERPL-MCNC: 85 MG/DL (ref 65–140)
GLUCOSE SERPL-MCNC: 91 MG/DL (ref 65–140)
GRAM STN SPEC: NORMAL
HCT VFR BLD AUTO: 35 % (ref 36.5–49.3)
HGB BLD-MCNC: 11.2 G/DL (ref 12–17)
IMM GRANULOCYTES # BLD AUTO: 0.03 THOUSAND/UL (ref 0–0.2)
IMM GRANULOCYTES NFR BLD AUTO: 0 % (ref 0–2)
INR PPP: 1.15 (ref 0.86–1.17)
LYMPHOCYTES # BLD AUTO: 2.66 THOUSANDS/ΜL (ref 0.6–4.47)
LYMPHOCYTES NFR BLD AUTO: 23 % (ref 14–44)
LYMPHOCYTES NFR CSF MANUAL: 49 %
MAGNESIUM SERPL-MCNC: 2 MG/DL (ref 1.6–2.6)
MCH RBC QN AUTO: 24.3 PG (ref 26.8–34.3)
MCHC RBC AUTO-ENTMCNC: 32 G/DL (ref 31.4–37.4)
MCV RBC AUTO: 76 FL (ref 82–98)
MONOCYTES # BLD AUTO: 0.87 THOUSAND/ΜL (ref 0.17–1.22)
MONOCYTES NFR BLD AUTO: 8 % (ref 4–12)
MONOS+MACROS CSF MANUAL: 12 %
NEUTROPHILS # BLD AUTO: 7.61 THOUSANDS/ΜL (ref 1.85–7.62)
NEUTROPHILS NFR CSF MANUAL: 38 %
NEUTS SEG NFR BLD AUTO: 67 % (ref 43–75)
NRBC BLD AUTO-RTO: 0 /100 WBCS
PHOSPHATE SERPL-MCNC: 2.3 MG/DL (ref 2.3–4.1)
PLATELET # BLD AUTO: 139 THOUSANDS/UL (ref 149–390)
PMV BLD AUTO: 12.6 FL (ref 8.9–12.7)
POTASSIUM SERPL-SCNC: 3.2 MMOL/L (ref 3.5–5.3)
PROT CSF-MCNC: 115 MG/DL (ref 15–45)
PROTHROMBIN TIME: 14.8 SECONDS (ref 11.8–14.2)
RBC # BLD AUTO: 4.61 MILLION/UL (ref 3.88–5.62)
RBC # CSF MANUAL: 5000 UL (ref 0–10)
RBC # CSF MANUAL: ABNORMAL UL (ref 0–10)
SODIUM SERPL-SCNC: 138 MMOL/L (ref 136–145)
TOTAL CELLS COUNTED BLD: NO
TOTAL CELLS COUNTED SPEC: 100
TUBE # CSF: 4
VANCOMYCIN TROUGH SERPL-MCNC: 11.8 UG/ML (ref 10–20)
WBC # BLD AUTO: 11.46 THOUSAND/UL (ref 4.31–10.16)
WBC # CSF AUTO: 6 /UL (ref 0–5)

## 2018-12-14 PROCEDURE — 82948 REAGENT STRIP/BLOOD GLUCOSE: CPT

## 2018-12-14 PROCEDURE — 87529 HSV DNA AMP PROBE: CPT | Performed by: EMERGENCY MEDICINE

## 2018-12-14 PROCEDURE — 36569 INSJ PICC 5 YR+ W/O IMAGING: CPT

## 2018-12-14 PROCEDURE — 85730 THROMBOPLASTIN TIME PARTIAL: CPT | Performed by: INTERNAL MEDICINE

## 2018-12-14 PROCEDURE — 99232 SBSQ HOSP IP/OBS MODERATE 35: CPT | Performed by: INTERNAL MEDICINE

## 2018-12-14 PROCEDURE — 05HY33Z INSERTION OF INFUSION DEVICE INTO UPPER VEIN, PERCUTANEOUS APPROACH: ICD-10-PCS | Performed by: INTERNAL MEDICINE

## 2018-12-14 PROCEDURE — 77001 FLUOROGUIDE FOR VEIN DEVICE: CPT

## 2018-12-14 PROCEDURE — 87498 ENTEROVIRUS PROBE&REVRS TRNS: CPT | Performed by: EMERGENCY MEDICINE

## 2018-12-14 PROCEDURE — 99232 SBSQ HOSP IP/OBS MODERATE 35: CPT | Performed by: PSYCHIATRY & NEUROLOGY

## 2018-12-14 PROCEDURE — 83735 ASSAY OF MAGNESIUM: CPT | Performed by: INTERNAL MEDICINE

## 2018-12-14 PROCEDURE — 62270 DX LMBR SPI PNXR: CPT

## 2018-12-14 PROCEDURE — 85610 PROTHROMBIN TIME: CPT | Performed by: INTERNAL MEDICINE

## 2018-12-14 PROCEDURE — 80048 BASIC METABOLIC PNL TOTAL CA: CPT | Performed by: INTERNAL MEDICINE

## 2018-12-14 PROCEDURE — 87532 HHV-6 DNA AMP PROBE: CPT | Performed by: EMERGENCY MEDICINE

## 2018-12-14 PROCEDURE — 87653 STREP B DNA AMP PROBE: CPT | Performed by: EMERGENCY MEDICINE

## 2018-12-14 PROCEDURE — 87070 CULTURE OTHR SPECIMN AEROBIC: CPT | Performed by: EMERGENCY MEDICINE

## 2018-12-14 PROCEDURE — 89051 BODY FLUID CELL COUNT: CPT | Performed by: EMERGENCY MEDICINE

## 2018-12-14 PROCEDURE — 80202 ASSAY OF VANCOMYCIN: CPT | Performed by: INTERNAL MEDICINE

## 2018-12-14 PROCEDURE — 82945 GLUCOSE OTHER FLUID: CPT | Performed by: EMERGENCY MEDICINE

## 2018-12-14 PROCEDURE — 85025 COMPLETE CBC W/AUTO DIFF WBC: CPT | Performed by: INTERNAL MEDICINE

## 2018-12-14 PROCEDURE — 87798 DETECT AGENT NOS DNA AMP: CPT | Performed by: EMERGENCY MEDICINE

## 2018-12-14 PROCEDURE — 87496 CYTOMEG DNA AMP PROBE: CPT | Performed by: EMERGENCY MEDICINE

## 2018-12-14 PROCEDURE — 84157 ASSAY OF PROTEIN OTHER: CPT | Performed by: EMERGENCY MEDICINE

## 2018-12-14 PROCEDURE — C1751 CATH, INF, PER/CENT/MIDLINE: HCPCS

## 2018-12-14 PROCEDURE — 89050 BODY FLUID CELL COUNT: CPT | Performed by: EMERGENCY MEDICINE

## 2018-12-14 PROCEDURE — 76937 US GUIDE VASCULAR ACCESS: CPT

## 2018-12-14 PROCEDURE — 84100 ASSAY OF PHOSPHORUS: CPT | Performed by: INTERNAL MEDICINE

## 2018-12-14 PROCEDURE — 009U3ZX DRAINAGE OF SPINAL CANAL, PERCUTANEOUS APPROACH, DIAGNOSTIC: ICD-10-PCS | Performed by: RADIOLOGY

## 2018-12-14 RX ORDER — LIDOCAINE HYDROCHLORIDE 10 MG/ML
5 INJECTION, SOLUTION EPIDURAL; INFILTRATION; INTRACAUDAL; PERINEURAL
Status: COMPLETED | OUTPATIENT
Start: 2018-12-14 | End: 2018-12-14

## 2018-12-14 RX ORDER — OLANZAPINE 10 MG/1
2.5 INJECTION, POWDER, LYOPHILIZED, FOR SOLUTION INTRAMUSCULAR EVERY 8 HOURS PRN
Status: DISCONTINUED | OUTPATIENT
Start: 2018-12-14 | End: 2018-12-18 | Stop reason: HOSPADM

## 2018-12-14 RX ORDER — POTASSIUM CHLORIDE 20 MEQ/1
40 TABLET, EXTENDED RELEASE ORAL ONCE
Status: COMPLETED | OUTPATIENT
Start: 2018-12-14 | End: 2018-12-14

## 2018-12-14 RX ORDER — DEXTROSE MONOHYDRATE 25 G/50ML
INJECTION, SOLUTION INTRAVENOUS
Status: COMPLETED
Start: 2018-12-14 | End: 2018-12-14

## 2018-12-14 RX ORDER — DEXTROSE MONOHYDRATE 25 G/50ML
25 INJECTION, SOLUTION INTRAVENOUS ONCE
Status: COMPLETED | OUTPATIENT
Start: 2018-12-14 | End: 2018-12-14

## 2018-12-14 RX ORDER — INSULIN GLARGINE 100 [IU]/ML
4 INJECTION, SOLUTION SUBCUTANEOUS
Status: DISCONTINUED | OUTPATIENT
Start: 2018-12-14 | End: 2018-12-15

## 2018-12-14 RX ADMIN — DEXTROSE 50 % IN WATER (D50W) INTRAVENOUS SYRINGE 25 ML: at 06:36

## 2018-12-14 RX ADMIN — TACROLIMUS 1 MG: 1 CAPSULE, GELATIN COATED ORAL at 08:38

## 2018-12-14 RX ADMIN — AMPICILLIN SODIUM 2000 MG: 2 INJECTION, POWDER, FOR SOLUTION INTRAMUSCULAR; INTRAVENOUS at 22:19

## 2018-12-14 RX ADMIN — ACETAMINOPHEN 650 MG: 325 TABLET, FILM COATED ORAL at 08:39

## 2018-12-14 RX ADMIN — AMPICILLIN SODIUM 2000 MG: 2 INJECTION, POWDER, FOR SOLUTION INTRAMUSCULAR; INTRAVENOUS at 03:53

## 2018-12-14 RX ADMIN — AMPICILLIN SODIUM 2000 MG: 2 INJECTION, POWDER, FOR SOLUTION INTRAMUSCULAR; INTRAVENOUS at 08:39

## 2018-12-14 RX ADMIN — DEXTROSE 50 % IN WATER (D50W) INTRAVENOUS SYRINGE 50 ML: at 11:49

## 2018-12-14 RX ADMIN — LIDOCAINE HYDROCHLORIDE 5 ML: 10 INJECTION, SOLUTION EPIDURAL; INFILTRATION; INTRACAUDAL; PERINEURAL at 14:47

## 2018-12-14 RX ADMIN — HEPARIN SODIUM 5000 UNITS: 5000 INJECTION INTRAVENOUS; SUBCUTANEOUS at 15:49

## 2018-12-14 RX ADMIN — SODIUM CHLORIDE, SODIUM GLUCONATE, SODIUM ACETATE, POTASSIUM CHLORIDE, MAGNESIUM CHLORIDE, SODIUM PHOSPHATE, DIBASIC, AND POTASSIUM PHOSPHATE 100 ML/HR: .53; .5; .37; .037; .03; .012; .00082 INJECTION, SOLUTION INTRAVENOUS at 19:36

## 2018-12-14 RX ADMIN — ACYCLOVIR SODIUM 600 MG: 50 INJECTION, SOLUTION INTRAVENOUS at 17:03

## 2018-12-14 RX ADMIN — INSULIN GLARGINE 4 UNITS: 100 INJECTION, SOLUTION SUBCUTANEOUS at 22:09

## 2018-12-14 RX ADMIN — CEFTRIAXONE SODIUM 2000 MG: 10 INJECTION, POWDER, FOR SOLUTION INTRAVENOUS at 02:49

## 2018-12-14 RX ADMIN — VANCOMYCIN HYDROCHLORIDE 1250 MG: 10 INJECTION, POWDER, LYOPHILIZED, FOR SOLUTION INTRAVENOUS at 00:02

## 2018-12-14 RX ADMIN — HEPARIN SODIUM 5000 UNITS: 5000 INJECTION INTRAVENOUS; SUBCUTANEOUS at 22:08

## 2018-12-14 RX ADMIN — HEPARIN SODIUM 5000 UNITS: 5000 INJECTION INTRAVENOUS; SUBCUTANEOUS at 05:15

## 2018-12-14 RX ADMIN — TACROLIMUS 1 MG: 1 CAPSULE, GELATIN COATED ORAL at 20:28

## 2018-12-14 RX ADMIN — SODIUM CHLORIDE, SODIUM GLUCONATE, SODIUM ACETATE, POTASSIUM CHLORIDE, MAGNESIUM CHLORIDE, SODIUM PHOSPHATE, DIBASIC, AND POTASSIUM PHOSPHATE 100 ML/HR: .53; .5; .37; .037; .03; .012; .00082 INJECTION, SOLUTION INTRAVENOUS at 08:40

## 2018-12-14 RX ADMIN — POTASSIUM CHLORIDE 40 MEQ: 1500 TABLET, EXTENDED RELEASE ORAL at 17:55

## 2018-12-14 RX ADMIN — DEXTROSE MONOHYDRATE 25 ML: 25 INJECTION, SOLUTION INTRAVENOUS at 06:36

## 2018-12-14 RX ADMIN — CEFTRIAXONE SODIUM 2000 MG: 10 INJECTION, POWDER, FOR SOLUTION INTRAVENOUS at 15:45

## 2018-12-14 RX ADMIN — AMPICILLIN SODIUM 2000 MG: 2 INJECTION, POWDER, FOR SOLUTION INTRAMUSCULAR; INTRAVENOUS at 15:57

## 2018-12-14 RX ADMIN — ACYCLOVIR SODIUM 600 MG: 50 INJECTION, SOLUTION INTRAVENOUS at 05:15

## 2018-12-14 NOTE — SOCIAL WORK
Cm reviewed patient during care coordination rounds with Dr Chino Yancey  Patient not stable for discharge today  Patient anticipated for possible discharge over the weekend pending medical clearance  Patient to go for lumbar puncture today  Patient on 1 to 1  PT recommending home therapy with 24/7 supervision  Cm spoke with patient's son Zandra Mc  [phone] who confirmed patient's wife Keyur Petty and patient's dtr will be home 24/7 to provide patient with assistance and supervision  Patient has 1st floor set up and 2-3STE  Patient's family can transport to appointments upon discharge  Patient's son agreeable to [facility] referral per his choice after discussing Vikram Lewis options  Cm placed referral per patient's son's choice  IMM reviewed and signed by patient's son as well  Awaiting determination from [facility]  Family to transport and requesting meds be filled at [facility] upon discharge  Cm following

## 2018-12-14 NOTE — PROGRESS NOTES
Unable to obtain consent for PICC line from family  Phone call was placed x4 to wife Alyssa Pizano the 2 times on the home phone and times on cell phone to no reply  Patient is non competent on exam to provide consent and is scheduled for lumbar puncture the around noon he with no viable access in case of emergency  Multiple attempts have been made to place IV line overnight and during the morning  At this time, PICC line appear medical necessity otherwise that would be if further delay in lumbar puncture using and diagnosis for proper medical treatment of what is presumed to be possible meningitis  PICC line consent has been signed by to hospitalist at 9:05 a m   On December 14, 2018, both signature on the consent Dr Chion Yancey as well as Dr Sukhi Eng

## 2018-12-14 NOTE — ASSESSMENT & PLAN NOTE
Resolving, no longer febrile, WBC remains elevated but with down trend, antibiotics as per principal plan

## 2018-12-14 NOTE — NURSING NOTE
Pt  Awake and alert all night  Pt  Did not sleep  No episodes of lethargy, no increased confusion, no desaturation of SpO2 despite receiving Ativan previously during security alert and per prior documentation of potentially ingesting home meds of Ambien and Temazepam left at bedside by wife

## 2018-12-14 NOTE — PROCEDURES
PICC Line Insertion  Date/Time: 12/14/2018 12:33 PM  Performed by: Nati Quiroga by: Westley Garcia     Patient location:  IR  Consent:     Consent obtained:  Written    Risks discussed:  Infection  Universal protocol:     Procedure explained and questions answered to patient or proxy's satisfaction: yes      Relevant documents present and verified: yes      Test results available and properly labeled: yes      Radiology Images displayed and confirmed  If images not available, report reviewed: yes      Required blood products, implants, devices, and special equipment available: yes      Site/side marked: yes      Immediately prior to procedure, a time out was called: yes      Patient identity confirmed:  Verbally with patient, arm band and hospital-assigned identification number  Pre-procedure details:     Hand hygiene: Hand hygiene performed prior to insertion      Sterile barrier technique: All elements of maximal sterile technique followed      Skin preparation:  ChloraPrep    Skin preparation agent: Skin preparation agent completely dried prior to procedure    Indications:     PICC line indications: long term antibiotics    Anesthesia (see MAR for exact dosages):      Anesthesia method:  Local infiltration    Local anesthetic:  Lidocaine 1% w/o epi  Procedure details:     Location:  Brachial    Vessel type: vein      Laterality:  Right    Approach: percutaneous technique used      Catheter size:  5 Fr    Landmarks identified: yes      Ultrasound guidance: yes      Sterile ultrasound techniques: Sterile gel and sterile probe covers were used      Number of attempts:  1    Successful placement: yes      Total catheter length (cm):  35    Catheter out on skin (cm):  0    Max flow rate:  999 ml/hr    Arm circumference:  25  Post-procedure details:     Post-procedure:  Securement device placed    Assessment:  Placement verified by x-ray and blood return through all ports    Post-procedure complications: none      Patient tolerance of procedure:   Tolerated well, no immediate complications

## 2018-12-14 NOTE — PROGRESS NOTES
Vancomycin IV Pharmacy-to-Dose Consultation    Yasmin Green is a 77 y o  male who is currently receiving Vancomycin IV with management by the Pharmacy Consult service  Assessment/Plan:  The patient was reviewed  Renal function is stable and no signs or symptoms of nephrotoxicity and/or infusion reactions were documented in the chart  Based on todays assessment, continue current vancomycin (day # 4]   dosing of 1250 mg q24,   with a plan for trough to be drawn at 10pm on 12-14    We will continue to follow the patients culture results and clinical progress daily      Jayesh Garcia, Pharmacist

## 2018-12-14 NOTE — NURSING NOTE
Pt  Stuck multiple times for IV site, unsuccessful  Call made to [facility] to attempt to have someone come with [facility]  Discussed with [facility], PA with [facility] possibility of placing PICC as pt  Has now gone through two different IV sites tonight and we have been unsuccessful up to this point with placing a new one  Pt  Has multiple IV Abx and acyclovir ordered throughout the day and needs two IVs as they are not compatible with IVFs currently ordered

## 2018-12-14 NOTE — PROGRESS NOTES
NEUROLOGY Daily Progress Note  Service: Neurology  Patient: Adrien Herrera 77 y o  male   MRN: [id number]  PCP: Carmencita Parker DO  Unit/Bed#: [facility] 807-01 Encounter: [id number]  Date Of Visit: 12/14/18    Assessment:    Principal Problem:    Encephalopathy acute  Active Problems:    Liver replaced by transplant [facility])    Personal history of transient ischemic attack (TIA), and cerebral infarction without residual deficits    Sepsis ([facility] [address] )    Acute renal failure superimposed on stage 3 chronic kidney disease ([facility] [address] )    Hyponatremia      Plan:  Acute encephalopathy- improving on antibiotic therapy coverage for CNS infection, the post concern for infectious/septic encephalopathy  Noting daily improvement on mental status and neurologic exam with current medical therapy  Continue antibiotic therapy with vancomycin and ceftriaxone IV ampicillin and antiviral acyclovir  For LP this morning/early afternoon today under IR guidance  He maintains on NPO status  Send specimen for cell count Gram stain HSV glucose protein  Follow microbiology trend fever curve and white blood cell count  Blood cultures remained negative to date  This will assist duration of treatment with medical therapy per ID recommendations    History of left lacunar infarct/CVA years ago no residual weakness    History of left ICA aneurysm status post clipping-stable on latest imaging this admission    Liver transplant 2012 [facility]    Medication induced immunosuppression tacrolimus b i d  CARLOTTA improved on CKD 31 5-1 7 historical baseline    Chronic thrombocytopenia baseline        Discussions with Specialists or Other Care Team Provider:  [facility] attending    Education and Discussions with Family / Patient:  Reviewed case extensively with family present at bedside yesterday    Time Spent for Care: 30 minutes  More than 50% of total time spent on counseling and coordination of care as described above      Current Length of Stay: 3 day(s)    Current Patient Status: Inpatient     Code Status: Level 1 - Full Code      Subjective:   "I feel good" denies fevers chills, headache is at baseline chronic and center of forehead, some agitation early last night requiring 4 point restraints did well otherwise over 1 to 1  Comfortable this morning hungry for food  No other complaints    Review of systems:  10 point review of systems underwent with patient about signing of otherwise stated positive in subjective above      Objective:     Vitals:   Temp (24hrs), Av 4 °F (36 9 °C), Min:97 3 °F (36 3 °C), Max:99 5 °F (37 5 °C)    Temp:  [97 3 °F (36 3 °C)-99 5 °F (37 5 °C)] 98 8 °F (37 1 °C)  HR:  [68-88] 88  Resp:  [12-20] 16  BP: (120-176)/(72-92) 120/77  SpO2:  [95 %-98 %] 98 %  Body mass index is 21 9 kg/m²  Input and Output Summary (last 24 hours): Intake/Output Summary (Last 24 hours) at 18 0926  Last data filed at 18 2201   Gross per 24 hour   Intake              240 ml   Output              900 ml   Net             -660 ml       Physical Exam:     Physical Exam   Constitutional: He appears well-developed and well-nourished  He appears distressed  HENT:   Head: Normocephalic and atraumatic  Mouth/Throat: No oropharyngeal exudate  Eyes: Pupils are equal, round, and reactive to light  Conjunctivae and EOM are normal  No scleral icterus  Neck: No JVD present  No tracheal deviation present  Cardiovascular: Normal rate, regular rhythm, normal heart sounds and intact distal pulses  Pulmonary/Chest: Effort normal and breath sounds normal  No respiratory distress  Abdominal: Soft  Bowel sounds are normal  There is no tenderness  Musculoskeletal: Normal range of motion  He exhibits no edema or deformity  Neurological: He is alert  He has normal reflexes  No cranial nerve deficit  He exhibits normal muscle tone  Coordination normal    Oriented to person place situation not time   Skin: Skin is warm and dry  No rash noted  He is not diaphoretic   No erythema  No pallor  Neurologic Exam   Mental Status:  Knows date today  Follows commands  Fluent speech  One of 3 correct on 5 minutes memory test recall  Cranial Nerves:  CN 2 - 12 intact  Motor Exam:  Muscle bulk: normal  Overall muscle tone: normal  Strength: 5/5 bilaterally  Motor: fine movements, normal finger to nose  No pronator drift  Sensory Exam:  Pin Prick equal bilaterally    Additional Data:     Labs:      Results from last 7 days  Lab Units 12/13/18  0456   WBC Thousand/uL 12 29*   HEMOGLOBIN g/dL 10 7*   HEMATOCRIT % 34 5*   PLATELETS Thousands/uL 131*   NEUTROS PCT % 74   LYMPHS PCT % 19   MONOS PCT % 7   EOS PCT % 0       Results from last 7 days  Lab Units 12/13/18  0456 12/12/18  0600   POTASSIUM mmol/L 3 1* 3 9   CHLORIDE mmol/L 103 103   CO2 mmol/L 22 25   BUN mg/dL 20 22   CREATININE mg/dL 1 51* 1 51*   CALCIUM mg/dL 8 6 7 7*   ALK PHOS U/L  --  122*   ALT U/L  --  27   AST U/L  --  42       Results from last 7 days  Lab Units 12/11/18  2046   INR  1 19*       * I Have Reviewed All Lab Data Listed Above  No labs this morning with inability vena puncture, blood cultures x2 pending from 12/12/2018  * Additional Pertinent Lab Tests Reviewed: [facility]    Imaging:    Imaging Reports Reviewed Today Include: CT head, past head scans, MRI pending      Recent Cultures (last 7 days):       Results from last 7 days  Lab Units 12/12/18  1222 12/12/18  1203 12/11/18  1414   BLOOD CULTURE   --  No Growth at 24 hrs  No Growth at 24 hrs  No Growth at 48 hrs     INFLUENZA B PCR  None Detected  --   --    RSV PCR  None Detected  --   --        Last 24 Hours Medication List:     Current Facility-Administered Medications:  acetaminophen 650 mg Oral Q6H PRN Marva Mcintosh PA-C    acyclovir 10 mg/kg (Ideal) Intravenous Q12H Trisha Krishnamurthy MD Last Rate: 600 mg (12/14/18 0515)   ampicillin 2,000 mg Intravenous Q6H Maria Emanuel MD Last Rate: 2,000 mg (12/14/18 0839) cefTRIAXone 2,000 mg Intravenous Q12H Venetta Mohs, MD Last Rate: 2,000 mg (12/14/18 0249)   heparin (porcine) 5,000 Units Subcutaneous [facility] Kye Leach MD    insulin glargine 4 Units Subcutaneous HS Jacinta Cantu MD    multi-electrolyte 100 mL/hr Intravenous Continuous Jacinta Cantu MD Last Rate: 100 mL/hr (12/14/18 0840)   OLANZapine 2 5 mg Intramuscular Q8H PRN Jacinta Cantu MD    tacrolimus 1 mg Oral Q12H Valentín Lares MD    vancomycin 20 mg/kg Intravenous Q24H Venetta Mohs, MD Last Rate: 1,250 mg (12/14/18 0002)        Today, Patient Was Seen By: Antonieta Balderrama PA-C    ** Please Note: Dragon 360 Dictation voice to text software may have been used in the creation of this document   **

## 2018-12-14 NOTE — PROGRESS NOTES
Per Dr Kristin Snyder, it is ok to discontinue the droplet precautions and no longer mask   It is also ok to place picc per his standpoint

## 2018-12-14 NOTE — PROGRESS NOTES
Progress Note - Infectious Disease   Nakita Krause 77 y o  male MRN: [id number]  Unit/Bed#: [facility] 807-01 Encounter: [id number]      Impression/Plan:  1  Fever and encephalopathy-unclear etiology   Certainly with associated headache, and in his immunosuppressed state, 1 must consider the possibility of an acute CNS infection such as meningitis or encephalitis  Zulema Avelar, the symptomatology seems to be markedly improved within well less than 24 hr which would make these CNS infections less likely  He continues to improve   Consideration for the possibility of encephalopathy associated with another infectious etiology such as a occult bacterial infection   Consideration for the possibility of medication effect  Fortunately, the patient remains hemodynamically stable and nontoxic despite his systemic illness   He seems to be tolerating the antibiotics well without difficulty  -continue vancomycin, ampicillin, ceftriaxone, and acyclovir for now  -aggressive hydration to decrease the risk of acyclovir nephrotoxicity  -lumbar puncture today  -close neurology follow-up  -recheck CBC with diff, and creatinine tomorrow  -follow-up blood cultures  -check vancomycin trough today     2  Sepsis-POA   Fever and tachycardia   Fortunately the patient remains hemodynamically stable and nontoxic despite the systemic illness   Unclear etiology at this point   Procalcitonin level is repeatedly normal   The blood cultures are negative thus far   -antibiotics as above  -follow-up blood cultures  -workup as above  -supportive care     3  Acute kidney injury-the setting of chronic kidney disease   Suspected pre renal issue   The renal function has improved with IV hydration and has now stabilized  -monitor the GFR  -dose adjusted antibiotics as above  -for volume management        4  Liver transplantation-on immunosuppressive therapy        Discussed in detail with the primary service and Neurology    Antibiotics:  Ceftriaxone 4     Ampicillin 4    Vancomycin 4  Acyclovir 4  Subjective:  Patient has no fever, chills, sweats; no nausea, vomiting, diarrhea; no cough, shortness of breath; no pain  No new symptoms  He is feeling much better overall  His headache is much improved  Objective:  Vitals:  Temp:  [98 2 °F (36 8 °C)-99 5 °F (37 5 °C)] 98 8 °F (37 1 °C)  HR:  [68-88] 88  Resp:  [12-20] 16  BP: (120-162)/(72-92) 120/77  SpO2:  [95 %-98 %] 98 %  Temp (24hrs), Av 6 °F (37 °C), Min:98 2 °F (36 8 °C), Max:99 5 °F (37 5 °C)  Current: Temperature: 98 8 °F (37 1 °C)    Physical Exam:   General Appearance:  Alert, interactive, nontoxic, no acute distress  Throat: Oropharynx moist without lesions  Lungs:   Clear to auscultation bilaterally; no wheezes, rhonchi or rales; respirations unlabored   Heart:  RRR; no murmur, rub or gallop   Abdomen:   Soft, non-tender, non-distended, positive bowel sounds  Extremities: No clubbing, cyanosis or edema   Skin: No new rashes or lesions  No draining wounds noted  Labs, Imaging, & Other studies:   All pertinent labs and imaging studies were personally reviewed    Results from last 7 days  Lab Units 18  1103 18  0456 18  0731   WBC Thousand/uL 11 46* 12 29* 13 38*   HEMOGLOBIN g/dL 11 2* 10 7* 10 5*   PLATELETS Thousands/uL 139* 131* 113*       Results from last 7 days  Lab Units 18  0456 18  0600 18  1413   SODIUM mmol/L 135* 137 126*   POTASSIUM mmol/L 3 1* 3 9 4 3   CHLORIDE mmol/L 103 103 94*   CO2 mmol/L 22 25 22   BUN mg/dL 20 22 24   CREATININE mg/dL 1 51* 1 51* 2 00*   EGFR ml/min/1 73sq m 46 46 33   CALCIUM mg/dL 8 6 7 7* 9 8   AST U/L  --  42 34   ALT U/L  --  27 29   ALK PHOS U/L  --  122* 155*       Results from last 7 days  Lab Units 18  1222 18  1203 18  1414   BLOOD CULTURE   --  No Growth at 24 hrs  No Growth at 24 hrs  No Growth at 48 hrs     INFLUENZA B PCR  None Detected  --   --    RSV PCR  None Detected  --   --

## 2018-12-14 NOTE — ASSESSMENT & PLAN NOTE
Baseline creatinine 1 6-1 7    Presented with creatinine about 2, acute kidney injury currently resolved  Will resume IV fluids as per Infectious Disease recommendation as patient remains on IV acyclovir, Isolyte for now 100 mL an hour  Creatinine stable at 1 46

## 2018-12-14 NOTE — PROGRESS NOTES
Progress Note - Mackenzie Dry 1950, 77 y o  male MRN: [id number]    [facility] Encounter: [id number]    Primary Care Provider: Laura Elaine DO   Date and time admitted to hospital: 12/11/2018  1:28 PM      Diabetes mellitus type 2  For now will reduce Lantus to 4 units HS given episode of hypoglycemia in the morning  Monitor closely    Hyponatremia   Assessment & Plan    Likely related vomiting and hypovolemia  Sodium today 138     Acute renal failure superimposed on stage 3 chronic kidney disease ([facility] [address] )   Assessment & Plan    Baseline creatinine 1 6-1 7  Presented with creatinine about 2, acute kidney injury currently resolved  Will resume IV fluids as per Infectious Disease recommendation as patient remains on IV acyclovir, Isolyte for now 100 mL an hour  Creatinine stable at 1 46     Sepsis ([facility] [address] )   Assessment & Plan    Resolving, no longer febrile, WBC remains elevated but with down trend, antibiotics as per principal plan     Personal history of transient ischemic attack (TIA), and cerebral infarction without residual deficits   Assessment & Plan    Hold Plavix for lumbar puncture, LP performed today, consider to restart tomorrow if no events overnight     Liver replaced by transplant [facility])   Assessment & Plan    Continue Prograf 1 mg Q 12    LFTs are stable  Discussed with Gastroenterology, continue immunosuppressant as per home dose, tacrolimus level therapeutic     * Encephalopathy acute   Assessment & Plan    Unclear etiology, possibly multifactorial related to acute kidney injury and hyponatremia together with possible infection cause such as meningitis  Patient was started on acyclovir, ampicillin, Rocephin, vancomycin  Encephalopathy is currently resolved although, patient has moment of increased confusion according to family on a background of memory loss    Status post lumbar puncture today, WBC elevated, glucose is low, protein are elevated as well possible infectious picture pending PCR and Gram stain  Continue antibiotics and antiviral as above for now  Further recommendation following complete pending study from LP  MRI is pending as well         VTE Pharmacologic Prophylaxis: yes  Pharmacologic: Heparin  Mechanical VTE Prophylaxis in Place: Yes    Patient Centered Rounds: I have performed bedside rounds with nursing staff today  Discussions with Specialists or Other Care Team Provider:  Neurology, Infectious Disease, IR    Education and Discussions with Family / Patient:  Wife at bedside    Time Spent for Care: 1 hour  More than 50% of total time spent on counseling and coordination of care as described above  Current Length of Stay: 3 day(s)    Current Patient Status: Inpatient   Certification Statement: The patient will continue to require additional inpatient hospital stay due to Please refer to above    Discharge Plan: To be determined    Code Status: Level 1 - Full Code      Subjective:   Pleasantly confused at times, overall appears comfortable    Objective:     Vitals:   Temp (24hrs), Av 7 °F (37 1 °C), Min:98 2 °F (36 8 °C), Max:99 5 °F (37 5 °C)    Temp:  [98 2 °F (36 8 °C)-99 5 °F (37 5 °C)] 98 6 °F (37 °C)  HR:  [68-88] 74  Resp:  [12-20] 18  BP: (120-162)/(72-92) 140/80  SpO2:  [98 %] 98 %  Body mass index is 21 9 kg/m²  Input and Output Summary (last 24 hours): Intake/Output Summary (Last 24 hours) at 18 1734  Last data filed at 18 2201   Gross per 24 hour   Intake              240 ml   Output              900 ml   Net             -660 ml       Physical Exam:     Physical Exam   Constitutional: He appears well-developed  Cardiovascular: Normal rate, regular rhythm and normal heart sounds  Exam reveals no friction rub  No murmur heard  Pulmonary/Chest: Effort normal  No respiratory distress  He has no wheezes  He has no rales  Abdominal: Soft  He exhibits no distension  There is no tenderness  There is no rebound     Musculoskeletal: He exhibits no edema  Neurological: He is alert  He exhibits normal muscle tone  Oriented to self, oriented to person, oriented to place although is unable to provide accurate history and understand his pathology   Skin: Skin is warm  Psychiatric: He has a normal mood and affect  Additional Data:     Labs:      Results from last 7 days  Lab Units 12/14/18  1103   WBC Thousand/uL 11 46*   HEMOGLOBIN g/dL 11 2*   HEMATOCRIT % 35 0*   PLATELETS Thousands/uL 139*   NEUTROS PCT % 67   LYMPHS PCT % 23   MONOS PCT % 8   EOS PCT % 2       Results from last 7 days  Lab Units 12/14/18  1103  12/12/18  0600   POTASSIUM mmol/L 3 2*  < > 3 9   CHLORIDE mmol/L 107  < > 103   CO2 mmol/L 23  < > 25   BUN mg/dL 16  < > 22   CREATININE mg/dL 1 46*  < > 1 51*   CALCIUM mg/dL 8 7  < > 7 7*   ALK PHOS U/L  --   --  122*   ALT U/L  --   --  27   AST U/L  --   --  42   < > = values in this interval not displayed  Results from last 7 days  Lab Units 12/14/18  1102   INR  1 15       * I Have Reviewed All Lab Data Listed Above  * Additional Pertinent Lab Tests Reviewed: All Labs Within Last 24 Hours Reviewed    Imaging:    Imaging Reports Reviewed Today Include:  None  Imaging Personally Reviewed by Myself Includes:  None    Recent Cultures (last 7 days):       Results from last 7 days  Lab Units 12/14/18  1445 12/12/18  1222 12/12/18  1203 12/11/18  1414   BLOOD CULTURE   --   --  No Growth at 48 hrs  No Growth at 48 hrs  No Growth at 72 hrs     GRAM STAIN RESULT  Rare Polys  Rare Mononuclear Cells  No bacteria seen  --   --   --    INFLUENZA B PCR   --  None Detected  --   --    RSV PCR   --  None Detected  --   --        Last 24 Hours Medication List:     Current Facility-Administered Medications:  acetaminophen 650 mg Oral Q6H PRN Marva Mcintosh PA-C    acyclovir 10 mg/kg (Ideal) Intravenous Q12H Trisha Krishnamurthy MD Last Rate: 600 mg (12/14/18 1703)   ampicillin 2,000 mg Intravenous Q6H Maria Emanuel MD Last Rate: 2,000 mg (12/14/18 1557)   cefTRIAXone 2,000 mg Intravenous Q12H Aline Stiles MD Last Rate: 2,000 mg (12/14/18 1545)   heparin (porcine) 5,000 Units Subcutaneous Q8H Emilia Wilkinson MD    insulin glargine 4 Units Subcutaneous HS Phoebe Neil MD    multi-electrolyte 100 mL/hr Intravenous Continuous Phoebe Neil MD Last Rate: 100 mL/hr (12/14/18 0840)   OLANZapine 2 5 mg Intramuscular Q8H PRN Phoebe Neil MD    tacrolimus 1 mg Oral Q12H Emilia Wilkinson MD    vancomycin 20 mg/kg Intravenous Q24H Aline Stiles MD Last Rate: 1,250 mg (12/14/18 0002)        Today, Patient Was Seen By: Phoebe Neil MD    ** Please Note: Dragon 360 Dictation voice to text software may have been used in the creation of this document   **

## 2018-12-14 NOTE — ASSESSMENT & PLAN NOTE
Unclear etiology, possibly multifactorial related to acute kidney injury and hyponatremia together with possible infection cause such as meningitis  Patient was started on acyclovir, ampicillin, Rocephin, vancomycin  Encephalopathy is currently resolved although, patient has moment of increased confusion according to family on a background of memory loss    Status post lumbar puncture today, WBC elevated, glucose is low, protein are elevated as well possible infectious picture pending PCR and Gram stain  Continue antibiotics and antiviral as above for now  Further recommendation following complete pending study from LP  MRI is pending as well

## 2018-12-14 NOTE — ASSESSMENT & PLAN NOTE
Hold Plavix for lumbar puncture, LP performed today, consider to restart tomorrow if no events overnight

## 2018-12-14 NOTE — PROCEDURES
Insert PICC line  Date/Time: 12/14/2018 10:35 AM  Performed by: Colby Coats  Authorized by: Boby Lindsay     Patient location:  Bedside  Other Assisting Provider: Yes (comment) El Mann    Consent:     Consent obtained: dual MD consent due to medical necessity  Consent given by:  Healthcare agent    Procedural risks discussed: by MD Mahogany Dee protocol:     Procedure explained and questions answered to patient or proxy's satisfaction: yes      Relevant documents present and verified: yes      Test results available and properly labeled: yes      Radiology Images displayed and confirmed  If images not available, report reviewed: yes      Required blood products, implants, devices, and special equipment available: yes      Site/side marked: yes      Immediately prior to procedure, a time out was called: yes      Patient identity confirmed:  Verbally with patient and arm band  Pre-procedure details:     Hand hygiene: Hand hygiene performed prior to insertion      Sterile barrier technique: All elements of maximal sterile technique followed      Skin preparation:  ChloraPrep    Skin preparation agent: Skin preparation agent completely dried prior to procedure    Indications:     PICC line indications: no peripheral vascular access    Anesthesia (see MAR for exact dosages):      Anesthesia method:  Local infiltration    Local anesthetic:  Lidocaine 1% w/o epi (3mL)  Procedure details:     Location:  Brachial    Vessel type: vein      Laterality:  Right    Site selection rationale:  No visual basillic or cephalic    Approach: percutaneous technique used      Patient position:  Flat    Procedural supplies:  Double lumen    Catheter size:  5 Fr    Landmarks identified: yes      Ultrasound guidance: yes      Sterile ultrasound techniques: Sterile gel and sterile probe covers were used      Number of attempts:  3    Successful placement: no      Total catheter length (cm):  40    Arm circumference: 36  Post-procedure details:     Post-procedure:  Dressing applied (guaze)    Post-procedure complications: none      Patient tolerance of procedure: Tolerated well, no immediate complications  Comments:      Attempted brachial vein multiple times, unable to advance guidewire more than 10 there fore piccline would not thread  Will need IR  Saundra Atkins RN made aware

## 2018-12-15 LAB
C GATTII+NEOFOR DNA CSF QL NAA+NON-PROBE: NOT DETECTED
CMV DNA CSF QL NAA+NON-PROBE: NOT DETECTED
E COLI K1 DNA CSF QL NAA+NON-PROBE: NOT DETECTED
EV RNA CSF QL NAA+NON-PROBE: NOT DETECTED
GLUCOSE SERPL-MCNC: 104 MG/DL (ref 65–140)
GLUCOSE SERPL-MCNC: 126 MG/DL (ref 65–140)
GLUCOSE SERPL-MCNC: 199 MG/DL (ref 65–140)
GLUCOSE SERPL-MCNC: 218 MG/DL (ref 65–140)
GLUCOSE SERPL-MCNC: 220 MG/DL (ref 65–140)
GP B STREP DNA CSF QL NAA+NON-PROBE: NOT DETECTED
HAEM INFLU DNA CSF QL NAA+NON-PROBE: NOT DETECTED
HHV6 DNA CSF QL NAA+NON-PROBE: NOT DETECTED
HSV1 DNA CSF QL NAA+NON-PROBE: NOT DETECTED
HSV2 DNA CSF QL NAA+NON-PROBE: NOT DETECTED
L MONOCYTOG DNA CSF QL NAA+NON-PROBE: NOT DETECTED
N MEN DNA CSF QL NAA+NON-PROBE: NOT DETECTED
PARECHOVIRUS A RNA CSF QL NAA+NON-PROBE: NOT DETECTED
S PNEUM DNA CSF QL NAA+NON-PROBE: NOT DETECTED
VZV DNA CSF QL NAA+NON-PROBE: NOT DETECTED

## 2018-12-15 PROCEDURE — 99232 SBSQ HOSP IP/OBS MODERATE 35: CPT | Performed by: INTERNAL MEDICINE

## 2018-12-15 PROCEDURE — 99232 SBSQ HOSP IP/OBS MODERATE 35: CPT | Performed by: PSYCHIATRY & NEUROLOGY

## 2018-12-15 PROCEDURE — 82948 REAGENT STRIP/BLOOD GLUCOSE: CPT

## 2018-12-15 RX ORDER — INSULIN GLARGINE 100 [IU]/ML
5 INJECTION, SOLUTION SUBCUTANEOUS
Status: DISCONTINUED | OUTPATIENT
Start: 2018-12-15 | End: 2018-12-16

## 2018-12-15 RX ORDER — LANOLIN ALCOHOL/MO/W.PET/CERES
6 CREAM (GRAM) TOPICAL
Status: DISCONTINUED | OUTPATIENT
Start: 2018-12-15 | End: 2018-12-18 | Stop reason: HOSPADM

## 2018-12-15 RX ORDER — CLOPIDOGREL BISULFATE 75 MG/1
75 TABLET ORAL DAILY
Status: DISCONTINUED | OUTPATIENT
Start: 2018-12-16 | End: 2018-12-18 | Stop reason: HOSPADM

## 2018-12-15 RX ORDER — TEMAZEPAM 15 MG/1
15 CAPSULE ORAL ONCE
Status: COMPLETED | OUTPATIENT
Start: 2018-12-16 | End: 2018-12-16

## 2018-12-15 RX ADMIN — ACETAMINOPHEN 650 MG: 325 TABLET, FILM COATED ORAL at 13:52

## 2018-12-15 RX ADMIN — TACROLIMUS 1 MG: 1 CAPSULE, GELATIN COATED ORAL at 08:43

## 2018-12-15 RX ADMIN — INSULIN GLARGINE 5 UNITS: 100 INJECTION, SOLUTION SUBCUTANEOUS at 21:34

## 2018-12-15 RX ADMIN — CEFTRIAXONE SODIUM 2000 MG: 10 INJECTION, POWDER, FOR SOLUTION INTRAVENOUS at 16:21

## 2018-12-15 RX ADMIN — TACROLIMUS 1 MG: 1 CAPSULE, GELATIN COATED ORAL at 21:33

## 2018-12-15 RX ADMIN — VANCOMYCIN HYDROCHLORIDE 1750 MG: 10 INJECTION, POWDER, LYOPHILIZED, FOR SOLUTION INTRAVENOUS at 00:19

## 2018-12-15 RX ADMIN — AMPICILLIN SODIUM 2000 MG: 2 INJECTION, POWDER, FOR SOLUTION INTRAMUSCULAR; INTRAVENOUS at 21:34

## 2018-12-15 RX ADMIN — AMPICILLIN SODIUM 2000 MG: 2 INJECTION, POWDER, FOR SOLUTION INTRAMUSCULAR; INTRAVENOUS at 03:56

## 2018-12-15 RX ADMIN — HEPARIN SODIUM 5000 UNITS: 5000 INJECTION INTRAVENOUS; SUBCUTANEOUS at 13:55

## 2018-12-15 RX ADMIN — SODIUM CHLORIDE, SODIUM GLUCONATE, SODIUM ACETATE, POTASSIUM CHLORIDE, MAGNESIUM CHLORIDE, SODIUM PHOSPHATE, DIBASIC, AND POTASSIUM PHOSPHATE 100 ML/HR: .53; .5; .37; .037; .03; .012; .00082 INJECTION, SOLUTION INTRAVENOUS at 19:17

## 2018-12-15 RX ADMIN — VANCOMYCIN HYDROCHLORIDE 1750 MG: 10 INJECTION, POWDER, LYOPHILIZED, FOR SOLUTION INTRAVENOUS at 23:47

## 2018-12-15 RX ADMIN — AMPICILLIN SODIUM 2000 MG: 2 INJECTION, POWDER, FOR SOLUTION INTRAMUSCULAR; INTRAVENOUS at 16:21

## 2018-12-15 RX ADMIN — ACYCLOVIR SODIUM 600 MG: 50 INJECTION, SOLUTION INTRAVENOUS at 18:25

## 2018-12-15 RX ADMIN — CEFTRIAXONE SODIUM 2000 MG: 10 INJECTION, POWDER, FOR SOLUTION INTRAVENOUS at 03:56

## 2018-12-15 RX ADMIN — MELATONIN 6 MG: at 21:33

## 2018-12-15 RX ADMIN — HEPARIN SODIUM 5000 UNITS: 5000 INJECTION INTRAVENOUS; SUBCUTANEOUS at 05:00

## 2018-12-15 RX ADMIN — AMPICILLIN SODIUM 2000 MG: 2 INJECTION, POWDER, FOR SOLUTION INTRAMUSCULAR; INTRAVENOUS at 09:50

## 2018-12-15 RX ADMIN — SODIUM CHLORIDE, SODIUM GLUCONATE, SODIUM ACETATE, POTASSIUM CHLORIDE, MAGNESIUM CHLORIDE, SODIUM PHOSPHATE, DIBASIC, AND POTASSIUM PHOSPHATE 100 ML/HR: .53; .5; .37; .037; .03; .012; .00082 INJECTION, SOLUTION INTRAVENOUS at 08:05

## 2018-12-15 RX ADMIN — ACYCLOVIR SODIUM 600 MG: 50 INJECTION, SOLUTION INTRAVENOUS at 05:00

## 2018-12-15 RX ADMIN — HEPARIN SODIUM 5000 UNITS: 5000 INJECTION INTRAVENOUS; SUBCUTANEOUS at 21:34

## 2018-12-15 NOTE — ASSESSMENT & PLAN NOTE
Lab Results   Component Value Date    HGBA1C 8 0 (H) 12/06/2017       Recent Labs      12/14/18   2112  12/15/18   0726  12/15/18   1116  12/15/18   1233   POCGLU  164*  126  218*  199*       Blood Sugar Average: Last 72 hrs:  (P) 187 [id number]   No more episodes of hypoglycemia, continue with current insulin regimen with only small increase of Lantus from 4 to 5 units HS

## 2018-12-15 NOTE — PROGRESS NOTES
Progress Note - Infectious Disease   Gene Ahumada 77 y o  male MRN: [id number]  Unit/Bed#: [facility] 807-01 Encounter: [id number]      Impression/Plan:  1  Fever and encephalopathy-unclear etiology   Certainly with associated headache, and in his immunosuppressed state, 1 must consider the possibility of an acute CNS infection such as meningitis or encephalitis  Jessenia Smaller, the symptomatology seems to be markedly improved within well less than 24 hr which would make these CNS infections less likely  He continues to improve   Consideration for the possibility of encephalopathy associated with another infectious etiology such as a occult bacterial infection   Consideration for the possibility of medication effect  Fortunately, the patient remains hemodynamically stable and nontoxic despite his systemic illness   He seems to be tolerating the antibiotics well without difficulty  Lumbar puncture revealed 6 WBCs, but suggestive of bloody tap  Glucose was 48, protein 115  Gram stain negative, culture negative so far  PCR panel still pending  Suspicion for acute CNS infection remains low   -continue vancomycin, ampicillin, ceftriaxone, and acyclovir for now pending final CSF results  -follow up final CSF culture  -follow up CSF comprehensive PCR  -aggressive hydration to decrease the risk of acyclovir nephrotoxicity  -close neurology follow-up  -recheck CBC with diff, and creatinine tomorrow  -follow-up blood cultures     2  Sepsis-POA   Fever and tachycardia   Fortunately the patient remains hemodynamically stable and nontoxic despite the systemic illness   Unclear etiology at this point   Procalcitonin level is repeatedly normal   The blood cultures remain negative   -antibiotics as above  -follow-up blood cultures  -workup as above  -supportive care     3   Acute kidney injury-the setting of chronic kidney disease   Suspected pre renal issue   The renal function has improved with IV hydration and has now stabilized  -monitor the GFR  -dose adjusted antibiotics as above  -for volume management        4  Liver transplantation-on immunosuppressive therapy           Antibiotics:  Ceftriaxone 5  Ampicillin 5  Vancomycin 5  Acyclovir 5  Subjective:  Patient states he feels much better today  Still complains of mild frontal headache  No visual disturbances  No neck pain or stiffness  Denies fevers or chills  Objective:  Vitals:  Temp:  [98 °F (36 7 °C)-99 6 °F (37 6 °C)] 98 °F (36 7 °C)  HR:  [74-83] 80  Resp:  [18] 18  BP: (140-160)/(76-80) 160/77  SpO2:  [98 %-99 %] 98 %  Temp (24hrs), Av 7 °F (37 1 °C), Min:98 °F (36 7 °C), Max:99 6 °F (37 6 °C)  Current: Temperature: 98 °F (36 7 °C)    Physical Exam:   General:  No acute distress  Psychiatric:  Awake and alert  Pulmonary:  Normal respiratory excursion without accessory muscle use  Abdomen:  Soft, nontender  Extremities:  No edema  Skin:  No rashes  Right-sided PICC line in place    Lab Results:  I have personally reviewed pertinent labs  Results from last 7 days  Lab Units 18  1103 18  0456 18  0600 18  1413   POTASSIUM mmol/L 3 2* 3 1* 3 9 4 3   CHLORIDE mmol/L 107 103 103 94*   CO2 mmol/L 23 22 25 22   BUN mg/dL 16 20 22 24   CREATININE mg/dL 1 46* 1 51* 1 51* 2 00*   EGFR ml/min/1 73sq m 48 46 46 33   CALCIUM mg/dL 8 7 8 6 7 7* 9 8   AST U/L  --   --  42 34   ALT U/L  --   --  27 29   ALK PHOS U/L  --   --  122* 155*       Results from last 7 days  Lab Units 18  1103 18  0456 18  0731   WBC Thousand/uL 11 46* 12 29* 13 38*   HEMOGLOBIN g/dL 11 2* 10 7* 10 5*   PLATELETS Thousands/uL 139* 131* 113*       Results from last 7 days  Lab Units 18  1445 18  1222 18  1203 18  1414   BLOOD CULTURE   --   --  No Growth at 48 hrs  No Growth at 48 hrs  No Growth at 72 hrs     GRAM STAIN RESULT  Rare Polys  Rare Mononuclear Cells  No bacteria seen  --   --   --    INFLUENZA B PCR   -- None Detected  --   --    RSV PCR   --  None Detected  --   --        Imaging Studies:   I have personally reviewed pertinent imaging study reports and images in PACS  EKG, Pathology, and Other Studies:   I have personally reviewed pertinent reports

## 2018-12-15 NOTE — PROGRESS NOTES
Vancomycin Assessment    Noemi Roberson is a 77 y o  male who is currently receiving vancomycin 1250mg q24h for other septic encephalopathy   Relevant clinical data and objective history reviewed:  Creatinine   Date Value Ref Range Status   12/14/2018 1 46 (H) 0 60 - 1 30 mg/dL Final     Comment:     Standardized to IDMS reference method   12/13/2018 1 51 (H) 0 60 - 1 30 mg/dL Final     Comment:     Standardized to IDMS reference method   12/12/2018 1 51 (H) 0 60 - 1 30 mg/dL Final     Comment:     Standardized to IDMS reference method   12/17/2015 1 81 (H) 0 60 - 1 30 mg/dL Final     Comment:     Standardized to IDMS reference method   12/16/2015 1 54 (H) 0 60 - 1 30 mg/dL Final     Comment:     Standardized to IDMS reference method   12/15/2015 1 60 (H) 0 60 - 1 30 mg/dL Final     Comment:     Standardized to IDMS reference method     Vancomycin Rm   Date Value Ref Range Status   12/12/2018 16 0 ug/mL Final     /80 (BP Location: Right arm)   Pulse 74   Temp 98 6 °F (37 °C) (Oral)   Resp 18   Ht 5' 4" (1 626 m)   Wt 57 9 kg (127 lb 9 6 oz)   SpO2 98%   BMI 21 90 kg/m²   I/O last 3 completed shifts: In: 340 [P O :340]  Out: 900 [Urine:900]  Lab Results   Component Value Date/Time    BUN 16 12/14/2018 11:03 AM    BUN 32 (H) 12/17/2015 10:00 AM    WBC 11 46 (H) 12/14/2018 11:03 AM    WBC 10 81 (H) 12/23/2015 01:50 PM    HGB 11 2 (L) 12/14/2018 11:03 AM    HGB 12 3 12/23/2015 01:50 PM    HCT 35 0 (L) 12/14/2018 11:03 AM    HCT 38 7 12/23/2015 01:50 PM    MCV 76 (L) 12/14/2018 11:03 AM    MCV 78 (L) 12/23/2015 01:50 PM     (L) 12/14/2018 11:03 AM     12/23/2015 01:50 PM     Temp Readings from Last 3 Encounters:   12/14/18 98 6 °F (37 °C) (Oral)   09/11/18 97 8 °F (36 6 °C)   09/20/17 98 1 °F (36 7 °C) (Oral)     Vancomycin Days of Therapy: 4    Assessment/Plan  The patient is currently on vancomycin utilizing scheduled dosing    Baseline risks associated with therapy include: pre-existing renal impairment and advanced age  The patient is receiving 1250mg q24h with the most recent vancomycin level being at steady-state and sub-therapeutic (11 8) based on a goal of 15-20 (appropriate for most indications) ; therefore, after clinical evaluation will be changed to 1750mg q24h    Pharmacy will continue to follow closely for s/sx of nephrotoxicity, infusion reactions and appropriateness of therapy  BMP and CBC will be ordered per protocol  Plan for trough as patient approaches steady state, prior to the 4th dose at approximately 2300 on 12/17  Pharmacy will continue to follow the patients culture results and clinical progress daily      Belen Baumann, Pharmacist

## 2018-12-15 NOTE — PROGRESS NOTES
Progress Note - Tila Ruggiero 1950, 77 y o  male MRN: [id number]    [facility] Encounter: [id number]    Primary Care Provider: Kasey Corley DO   Date and time admitted to hospital: 12/11/2018  1:28 PM        Hyponatremia   Assessment & Plan    Likely related vomiting and hypovolemia  Sodium last 138     Acute renal failure superimposed on stage 3 chronic kidney disease ([facility] [address] )   Assessment & Plan    Baseline creatinine 1 6-1 7  Presented with creatinine about 2, acute kidney injury currently resolved  Continue with IV fluids, isolyte, as patient remains on acyclovir to avoid possible nephrotoxicity  BMP in the morning  Yesterday creatinine 1 46     Sepsis ([facility] [address] )   Assessment & Plan    Resolving, no longer febrile, WBC remains elevated but with down trend, antibiotics as per principal plan     Personal history of transient ischemic attack (TIA), and cerebral infarction without residual deficits   Assessment & Plan    Restart Plavix tomorrow, stopped for lumbar puncture but this time no plan for any further interventions     Liver replaced by transplant [facility])   Assessment & Plan    Continue Prograf 1 mg Q 12    LFTs are stable  Discussed with Gastroenterology, continue immunosuppressant as per home dose, tacrolimus level therapeutic     Controlled diabetes mellitus with diabetic neuropathy, with long-term current use of insulin [facility])   Assessment & Plan    Lab Results   Component Value Date    HGBA1C 8 0 (H) 12/06/2017       Recent Labs      12/14/18   2112  12/15/18   0726  12/15/18   1116  12/15/18   1233   POCGLU  164*  126  218*  199*       Blood Sugar Average: Last 72 hrs:  (P) 381 [id number]   No more episodes of hypoglycemia, continue with current insulin regimen with only small increase of Lantus from 4 to 5 units HS     * Encephalopathy acute   Assessment & Plan    Unclear etiology, possibly multifactorial related to acute kidney injury and hyponatremia together with possible infection cause such as meningitis  Patient was started on acyclovir, ampicillin, Rocephin, vancomycin  Encephalopathy is currently resolved although, patient has moment of increased confusion according to family on a background of memory loss    Status post lumbar puncture , WBC elevated although, bloody tap with 5000 RBCs,, glucose is low, protein are elevated  pending PCR and culture with negative Gram stain  Continue antibiotics and antiviral as above for now  Further recommendation following complete pending study from LP  MRI is pending as well  Patient mental status today appears to be largely improved also as per wife was spoken with the patient over the phone today he appears to be very close to his baseline with background of memory loss         VTE Pharmacologic Prophylaxis: yes  Pharmacologic: Heparin  Mechanical VTE Prophylaxis in Place: Yes    Patient Centered Rounds: I have performed bedside rounds with nursing staff today  Discussions with Specialists or Other Care Team Provider:  Neurology    Education and Discussions with Family / Patient:  Patient, wife    Time Spent for Care: 45 minutes  More than 50% of total time spent on counseling and coordination of care as described above  Current Length of Stay: 4 day(s)    Current Patient Status: Inpatient   Certification Statement: The patient will continue to require additional inpatient hospital stay due to Please refer to above    Discharge Plan:  Be determined, ultimately home when medically stable    Code Status: Level 1 - Full Code      Subjective:   Reports that he feels very well today  He is in a good mood  He did not sleep overnight but he slept part of the morning  Good appetite, no complaints or concerns      Objective:     Vitals:   Temp (24hrs), Av 6 °F (37 °C), Min:98 °F (36 7 °C), Max:99 6 °F (37 6 °C)    Temp:  [98 °F (36 7 °C)-99 6 °F (37 6 °C)] 98 3 °F (36 8 °C)  HR:  [80-83] 83  Resp:  [18] 18  BP: (128-160)/(72-77) 128/72  SpO2: [97 %-99 %] 97 %  Body mass index is 21 9 kg/m²  Input and Output Summary (last 24 hours): Intake/Output Summary (Last 24 hours) at 12/15/18 1613  Last data filed at 12/15/18 1352   Gross per 24 hour   Intake             4210 ml   Output                0 ml   Net             4210 ml       Physical Exam:     Physical Exam   Constitutional: He is oriented to person, place, and time  He appears well-developed  Cardiovascular: Normal rate, regular rhythm and normal heart sounds  Exam reveals no friction rub  No murmur heard  Pulmonary/Chest: Effort normal  No respiratory distress  He has no wheezes  He has no rales  Abdominal: Soft  He exhibits no distension  There is no tenderness  There is no rebound  Musculoskeletal: He exhibits no edema  Neurological: He is alert and oriented to person, place, and time  He exhibits normal muscle tone  Skin: Skin is warm  Psychiatric: He has a normal mood and affect  Additional Data:     Labs:      Results from last 7 days  Lab Units 12/14/18  1103   WBC Thousand/uL 11 46*   HEMOGLOBIN g/dL 11 2*   HEMATOCRIT % 35 0*   PLATELETS Thousands/uL 139*   NEUTROS PCT % 67   LYMPHS PCT % 23   MONOS PCT % 8   EOS PCT % 2       Results from last 7 days  Lab Units 12/14/18  1103  12/12/18  0600   POTASSIUM mmol/L 3 2*  < > 3 9   CHLORIDE mmol/L 107  < > 103   CO2 mmol/L 23  < > 25   BUN mg/dL 16  < > 22   CREATININE mg/dL 1 46*  < > 1 51*   CALCIUM mg/dL 8 7  < > 7 7*   ALK PHOS U/L  --   --  122*   ALT U/L  --   --  27   AST U/L  --   --  42   < > = values in this interval not displayed  Results from last 7 days  Lab Units 12/14/18  1102   INR  1 15       * I Have Reviewed All Lab Data Listed Above  * Additional Pertinent Lab Tests Reviewed:  All Labs Within Last 24 Hours Reviewed    Imaging:    Imaging Reports Reviewed Today Include:  None  Imaging Personally Reviewed by Myself Includes:  None    Recent Cultures (last 7 days):       Results from last 7 days  Lab Units 12/14/18  1445 12/12/18  1222 12/12/18  1203 12/11/18  1414   BLOOD CULTURE   --   --  No Growth at 72 hrs  No Growth at 72 hrs  No Growth at 72 hrs  GRAM STAIN RESULT  Rare Polys  Rare Mononuclear Cells  No bacteria seen  --   --   --    INFLUENZA B PCR   --  None Detected  --   --    RSV PCR   --  None Detected  --   --        Last 24 Hours Medication List:     Current Facility-Administered Medications:  acetaminophen 650 mg Oral Q6H PRN Urmila Lehman PA-C    acyclovir 10 mg/kg (Ideal) Intravenous Q12H Mckenzie Yu MD Last Rate: 600 mg (12/15/18 0500)   ampicillin 2,000 mg Intravenous Q6H Muna Cordova MD Last Rate: Stopped (12/15/18 1026)   cefTRIAXone 2,000 mg Intravenous Q12H Mckenzie Yu MD Last Rate: 2,000 mg (12/15/18 0356)   [START ON 12/16/2018] clopidogrel 75 mg Oral Daily Anastasia Orantes MD    heparin (porcine) 5,000 Units Subcutaneous Counts include 234 beds at the [facility] Muna Cordova MD    insulin glargine 5 Units Subcutaneous HS Anastasia Orantes MD    multi-electrolyte 100 mL/hr Intravenous Continuous Anastasia Orantes MD Last Rate: 100 mL/hr (12/15/18 0805)   OLANZapine 2 5 mg Intramuscular Q8H PRN Anastasia Orantes MD    tacrolimus 1 mg Oral Q12H Ld Poon MD    vancomycin 1,750 mg Intravenous Q24H Mckenzie Yu MD Last Rate: 1,750 mg (12/15/18 0019)        Today, Patient Was Seen By: Anastasia Orantes MD    ** Please Note: Dragon 360 Dictation voice to text software may have been used in the creation of this document   **

## 2018-12-15 NOTE — NURSING NOTE
Patient seen at the bedside  Patient pleasant and cooperative with his care  1:1 supervision maintained  Bedside table and call bell within reach   SD 12/14/2018 9937

## 2018-12-15 NOTE — ASSESSMENT & PLAN NOTE
Restart Plavix tomorrow, stopped for lumbar puncture but this time no plan for any further interventions

## 2018-12-15 NOTE — CONSULTS
PATIENT NAME: Dimas Diallo,  YOB: 1950  MEDICAL RECORD NUMBER: [id number]  Neurology Follow up Note     Following patient for: Concern of meningitis    Overnight Events: None    Subjective: Patient feels well today today  Family is at the bedside  They report he is at or near his baseline  12 point ROS negative for any changes  Scheduled Meds:  Current Facility-Administered Medications:  acetaminophen 650 mg Oral Q6H PRN Armaan Sethi PA-C    acyclovir 10 mg/kg (Ideal) Intravenous Q12H Aline Stiles MD Last Rate: 600 mg (12/15/18 1825)   ampicillin 2,000 mg Intravenous Q6H Wesley Huston MD Last Rate: 2,000 mg (12/15/18 1621)   cefTRIAXone 2,000 mg Intravenous Q12H Aline Stiles MD Last Rate: 2,000 mg (12/15/18 1621)   [START ON 12/16/2018] clopidogrel 75 mg Oral Daily Phoebe Neil MD    heparin (porcine) 5,000 Units Subcutaneous [facility] Wesley Huston MD    insulin glargine 5 Units Subcutaneous HS Phoebe Neil MD    melatonin 6 mg Oral HS Phoebe Neil MD    multi-electrolyte 100 mL/hr Intravenous Continuous Phoebe Neil MD Last Rate: 100 mL/hr (12/15/18 0805)   OLANZapine 2 5 mg Intramuscular Q8H PRN Phoebe Neil MD    tacrolimus 1 mg Oral Q12H Emilia Wilkinson MD    vancomycin 1,750 mg Intravenous Q24H Aline Stiles MD Last Rate: 1,750 mg (12/15/18 0019)     Continuous Infusions:  multi-electrolyte 100 mL/hr Last Rate: 100 mL/hr (12/15/18 0805)     PRN Meds:   acetaminophen    OLANZapine      Objective  /72 (BP Location: Left arm)   Pulse 83   Temp 98 3 °F (36 8 °C) (Oral)   Resp 18   Ht 5' 4" (1 626 m)   Wt 57 9 kg (127 lb 9 6 oz)   SpO2 97%   BMI 21 90 kg/m²   GEN: Comfortable, NAD  HEENT: NC/AT  Anicteric  PPC  MMM   CVS: RRR  S1S2  No M/R/G    LUNGS: B/L entry, no wheezes, rhonchi or rales  EXT: B/L pulses, no edema  Mental Status: The patient was awake, alert, attentive, oriented to person, place, and time   Recent and remote memory intact to conversation with no evidence of language dysfunction  Satisfactory fund of knowledge  Normal attention span and concentration  Able to name, repeat, describe a complex scene  Cranial Nerves:   I: smell Not tested   II: visual fields Full to confrontation  Pupils equal, round, reactive to light with normal accomodation  Fundus: normal cup to disc ratio with no edema  III,IV,VI: extraocular muscles EOMI, no nystagmus   V: masseter and pterygoid strength  Sensation in the V1 through V3 distributions intact to pinprick and light touch bilaterally  VII: Face is symmetric with no weakness noted  VIII: Audition intact to finger rub bilaterally  IX/X: Uvula midline  Soft palate elevation symmetric  XI: Trapezius and SCM strength 5/5 B/L  XII: Tongue midline with no atrophy or fasciculations with appropriate movement  Motor Examination:   Bulk: Normal  No atrophy  Tone: Normal   Fasciculations: None  Shoulder Shoulder Elbow    Elbow Wrist    Wrist    Intrinsics   Abduction Adduction Flexion    Ext  Flexion    Ext  Right 5  5  5    5  5    5    5    Left 5  5  5    5  5    5    5            Hip   Hip Knee    Knee      Plantar Dorsi   Flexion   Ext  Flexion    Ext  Flexion Flexion   Right   5   5 5    5      5  5  Left   5   5 5    5      5  5         Reflexes:                   Biceps Brachioradialis Triceps Patella Achilles Plantars   Right          2+            2+                  2+        2+       2+         Down   Left            2+             2+                 2+         2+       2+         Down       Coordination: Patient able to perform normal finger-to-nose and heel to shin appropriately  Normal rapid alternating movements  Sensory: Normal sensation to light touch, pin prick and vibratory sensation throughout  Gait:normal stance and posture, normal stride length and arm swing, normal turn around  Patient able to perform tandem gait without difficulty   Able toe walk and heel walk without difficulty  Romberg negative  Recent Results (from the past 24 hour(s))   Fingerstick Glucose (POCT)    Collection Time: 12/14/18  9:12 PM   Result Value Ref Range    POC Glucose 164 (H) 65 - 140 mg/dl   Vancomycin, trough    Collection Time: 12/14/18 10:06 PM   Result Value Ref Range    Vancomycin Tr 11 8 10 0 - 20 0 ug/mL   Fingerstick Glucose (POCT)    Collection Time: 12/15/18  7:26 AM   Result Value Ref Range    POC Glucose 126 65 - 140 mg/dl   Fingerstick Glucose (POCT)    Collection Time: 12/15/18 11:16 AM   Result Value Ref Range    POC Glucose 218 (H) 65 - 140 mg/dl   Fingerstick Glucose (POCT)    Collection Time: 12/15/18 12:33 PM   Result Value Ref Range    POC Glucose 199 (H) 65 - 140 mg/dl   Fingerstick Glucose (POCT)    Collection Time: 12/15/18  4:19 PM   Result Value Ref Range    POC Glucose 220 (H) 65 - 140 mg/dl         A/P  77year old man with prior stroke, liver transplant, now hospital day 5 after experiencing headache, fever, altered mental status, nausea/vomiting  No longer symptomatic  LP results unremarkable  ID following  MRI to be performed

## 2018-12-15 NOTE — PROGRESS NOTES
Vancomycin IV Pharmacy-to-Dose Consultation    Hannah Duarte is a 77 y o  male who is currently receiving Vancomycin IV with management by the Pharmacy Consult service  Assessment/Plan:  The patient was reviewed  Renal function is stable and no signs or symptoms of nephrotoxicity and/or infusion reactions were documented in the chart  Based on todays assessment, continue current vancomycin (day # 5) dosing of 1750mg q24h, with a plan for trough to be drawn at 2300 on 12/17  We will continue to follow the patients culture results and clinical progress daily      Mely Wallace, Pharmacist

## 2018-12-15 NOTE — ASSESSMENT & PLAN NOTE
Baseline creatinine 1 6-1 7    Presented with creatinine about 2, acute kidney injury currently resolved  Continue with IV fluids, isolyte, as patient remains on acyclovir to avoid possible nephrotoxicity  BMP in the morning  Yesterday creatinine 1 46

## 2018-12-15 NOTE — ASSESSMENT & PLAN NOTE
Unclear etiology, possibly multifactorial related to acute kidney injury and hyponatremia together with possible infection cause such as meningitis  Patient was started on acyclovir, ampicillin, Rocephin, vancomycin  Encephalopathy is currently resolved although, patient has moment of increased confusion according to family on a background of memory loss    Status post lumbar puncture 12/14, WBC elevated although, bloody tap with 5000 RBCs,, glucose is low, protein are elevated  pending PCR and culture with negative Gram stain  Continue antibiotics and antiviral as above for now  Further recommendation following complete pending study from LP  MRI is pending as well  Patient mental status today appears to be largely improved also as per wife was spoken with the patient over the phone today he appears to be very close to his baseline with background of memory loss

## 2018-12-16 ENCOUNTER — APPOINTMENT (INPATIENT)
Dept: RADIOLOGY | Facility: HOSPITAL | Age: 68
DRG: 064 | End: 2018-12-16
Payer: MEDICARE

## 2018-12-16 LAB
ANION GAP SERPL CALCULATED.3IONS-SCNC: 12 MMOL/L (ref 4–13)
BACTERIA BLD CULT: NORMAL
BASOPHILS # BLD AUTO: 0.03 THOUSANDS/ΜL (ref 0–0.1)
BASOPHILS NFR BLD AUTO: 0 % (ref 0–1)
BUN SERPL-MCNC: 12 MG/DL (ref 5–25)
CALCIUM SERPL-MCNC: 7.2 MG/DL (ref 8.3–10.1)
CHLORIDE SERPL-SCNC: 105 MMOL/L (ref 100–108)
CO2 SERPL-SCNC: 23 MMOL/L (ref 21–32)
CREAT SERPL-MCNC: 1.42 MG/DL (ref 0.6–1.3)
EOSINOPHIL # BLD AUTO: 0.43 THOUSAND/ΜL (ref 0–0.61)
EOSINOPHIL NFR BLD AUTO: 6 % (ref 0–6)
ERYTHROCYTE [DISTWIDTH] IN BLOOD BY AUTOMATED COUNT: 14.5 % (ref 11.6–15.1)
FERRITIN SERPL-MCNC: 216 NG/ML (ref 8–388)
GFR SERPL CREATININE-BSD FRML MDRD: 50 ML/MIN/1.73SQ M
GLUCOSE SERPL-MCNC: 133 MG/DL (ref 65–140)
GLUCOSE SERPL-MCNC: 148 MG/DL (ref 65–140)
GLUCOSE SERPL-MCNC: 53 MG/DL (ref 65–140)
GLUCOSE SERPL-MCNC: 71 MG/DL (ref 65–140)
GLUCOSE SERPL-MCNC: 86 MG/DL (ref 65–140)
HCT VFR BLD AUTO: 29.8 % (ref 36.5–49.3)
HGB BLD-MCNC: 9.5 G/DL (ref 12–17)
IMM GRANULOCYTES # BLD AUTO: 0.02 THOUSAND/UL (ref 0–0.2)
IMM GRANULOCYTES NFR BLD AUTO: 0 % (ref 0–2)
IRON SATN MFR SERPL: 34 %
IRON SERPL-MCNC: 61 UG/DL (ref 65–175)
LYMPHOCYTES # BLD AUTO: 2.07 THOUSANDS/ΜL (ref 0.6–4.47)
LYMPHOCYTES NFR BLD AUTO: 27 % (ref 14–44)
MAGNESIUM SERPL-MCNC: 1.5 MG/DL (ref 1.6–2.6)
MCH RBC QN AUTO: 24.2 PG (ref 26.8–34.3)
MCHC RBC AUTO-ENTMCNC: 31.9 G/DL (ref 31.4–37.4)
MCV RBC AUTO: 76 FL (ref 82–98)
MONOCYTES # BLD AUTO: 0.68 THOUSAND/ΜL (ref 0.17–1.22)
MONOCYTES NFR BLD AUTO: 9 % (ref 4–12)
NEUTROPHILS # BLD AUTO: 4.37 THOUSANDS/ΜL (ref 1.85–7.62)
NEUTS SEG NFR BLD AUTO: 58 % (ref 43–75)
NRBC BLD AUTO-RTO: 0 /100 WBCS
PHOSPHATE SERPL-MCNC: 2.5 MG/DL (ref 2.3–4.1)
PLATELET # BLD AUTO: 121 THOUSANDS/UL (ref 149–390)
PMV BLD AUTO: 12.7 FL (ref 8.9–12.7)
POTASSIUM SERPL-SCNC: 3 MMOL/L (ref 3.5–5.3)
RBC # BLD AUTO: 3.92 MILLION/UL (ref 3.88–5.62)
SODIUM SERPL-SCNC: 140 MMOL/L (ref 136–145)
TIBC SERPL-MCNC: 177 UG/DL (ref 250–450)
WBC # BLD AUTO: 7.6 THOUSAND/UL (ref 4.31–10.16)

## 2018-12-16 PROCEDURE — 70551 MRI BRAIN STEM W/O DYE: CPT

## 2018-12-16 PROCEDURE — 83550 IRON BINDING TEST: CPT | Performed by: INTERNAL MEDICINE

## 2018-12-16 PROCEDURE — 82948 REAGENT STRIP/BLOOD GLUCOSE: CPT

## 2018-12-16 PROCEDURE — 80048 BASIC METABOLIC PNL TOTAL CA: CPT | Performed by: INTERNAL MEDICINE

## 2018-12-16 PROCEDURE — 83735 ASSAY OF MAGNESIUM: CPT | Performed by: INTERNAL MEDICINE

## 2018-12-16 PROCEDURE — 99232 SBSQ HOSP IP/OBS MODERATE 35: CPT | Performed by: INTERNAL MEDICINE

## 2018-12-16 PROCEDURE — 85025 COMPLETE CBC W/AUTO DIFF WBC: CPT | Performed by: INTERNAL MEDICINE

## 2018-12-16 PROCEDURE — 84100 ASSAY OF PHOSPHORUS: CPT | Performed by: INTERNAL MEDICINE

## 2018-12-16 PROCEDURE — 83540 ASSAY OF IRON: CPT | Performed by: INTERNAL MEDICINE

## 2018-12-16 PROCEDURE — 99232 SBSQ HOSP IP/OBS MODERATE 35: CPT | Performed by: PSYCHIATRY & NEUROLOGY

## 2018-12-16 PROCEDURE — 87493 C DIFF AMPLIFIED PROBE: CPT | Performed by: INTERNAL MEDICINE

## 2018-12-16 PROCEDURE — 82728 ASSAY OF FERRITIN: CPT | Performed by: INTERNAL MEDICINE

## 2018-12-16 RX ORDER — ATORVASTATIN CALCIUM 20 MG/1
20 TABLET, FILM COATED ORAL
Status: DISCONTINUED | OUTPATIENT
Start: 2018-12-16 | End: 2018-12-18 | Stop reason: HOSPADM

## 2018-12-16 RX ORDER — TEMAZEPAM 15 MG/1
30 CAPSULE ORAL
Status: DISCONTINUED | OUTPATIENT
Start: 2018-12-16 | End: 2018-12-18 | Stop reason: HOSPADM

## 2018-12-16 RX ORDER — MAGNESIUM SULFATE HEPTAHYDRATE 40 MG/ML
2 INJECTION, SOLUTION INTRAVENOUS ONCE
Status: COMPLETED | OUTPATIENT
Start: 2018-12-16 | End: 2018-12-16

## 2018-12-16 RX ORDER — POTASSIUM CHLORIDE 20 MEQ/1
20 TABLET, EXTENDED RELEASE ORAL ONCE
Status: COMPLETED | OUTPATIENT
Start: 2018-12-16 | End: 2018-12-16

## 2018-12-16 RX ORDER — POTASSIUM CHLORIDE 20 MEQ/1
40 TABLET, EXTENDED RELEASE ORAL ONCE
Status: COMPLETED | OUTPATIENT
Start: 2018-12-16 | End: 2018-12-16

## 2018-12-16 RX ORDER — INSULIN GLARGINE 100 [IU]/ML
4 INJECTION, SOLUTION SUBCUTANEOUS
Status: DISCONTINUED | OUTPATIENT
Start: 2018-12-16 | End: 2018-12-18 | Stop reason: HOSPADM

## 2018-12-16 RX ADMIN — ACYCLOVIR SODIUM 600 MG: 50 INJECTION, SOLUTION INTRAVENOUS at 06:11

## 2018-12-16 RX ADMIN — CEFTRIAXONE SODIUM 2000 MG: 10 INJECTION, POWDER, FOR SOLUTION INTRAVENOUS at 04:55

## 2018-12-16 RX ADMIN — HEPARIN SODIUM 5000 UNITS: 5000 INJECTION INTRAVENOUS; SUBCUTANEOUS at 13:30

## 2018-12-16 RX ADMIN — AMPICILLIN SODIUM 2000 MG: 2 INJECTION, POWDER, FOR SOLUTION INTRAMUSCULAR; INTRAVENOUS at 03:54

## 2018-12-16 RX ADMIN — ACETAMINOPHEN 650 MG: 325 TABLET, FILM COATED ORAL at 04:17

## 2018-12-16 RX ADMIN — MAGNESIUM SULFATE IN WATER 2 G: 40 INJECTION, SOLUTION INTRAVENOUS at 09:33

## 2018-12-16 RX ADMIN — TEMAZEPAM 15 MG: 15 CAPSULE ORAL at 00:01

## 2018-12-16 RX ADMIN — INSULIN LISPRO 1 UNITS: 100 INJECTION, SOLUTION INTRAVENOUS; SUBCUTANEOUS at 11:47

## 2018-12-16 RX ADMIN — TACROLIMUS 1 MG: 1 CAPSULE, GELATIN COATED ORAL at 09:01

## 2018-12-16 RX ADMIN — POTASSIUM CHLORIDE 40 MEQ: 1500 TABLET, EXTENDED RELEASE ORAL at 09:01

## 2018-12-16 RX ADMIN — ATORVASTATIN CALCIUM 20 MG: 20 TABLET, FILM COATED ORAL at 19:09

## 2018-12-16 RX ADMIN — SODIUM CHLORIDE, SODIUM GLUCONATE, SODIUM ACETATE, POTASSIUM CHLORIDE, MAGNESIUM CHLORIDE, SODIUM PHOSPHATE, DIBASIC, AND POTASSIUM PHOSPHATE 100 ML/HR: .53; .5; .37; .037; .03; .012; .00082 INJECTION, SOLUTION INTRAVENOUS at 04:02

## 2018-12-16 RX ADMIN — INSULIN GLARGINE 4 UNITS: 100 INJECTION, SOLUTION SUBCUTANEOUS at 21:41

## 2018-12-16 RX ADMIN — HEPARIN SODIUM 5000 UNITS: 5000 INJECTION INTRAVENOUS; SUBCUTANEOUS at 06:11

## 2018-12-16 RX ADMIN — POTASSIUM CHLORIDE 20 MEQ: 1500 TABLET, EXTENDED RELEASE ORAL at 10:18

## 2018-12-16 RX ADMIN — SODIUM CHLORIDE, SODIUM GLUCONATE, SODIUM ACETATE, POTASSIUM CHLORIDE, MAGNESIUM CHLORIDE, SODIUM PHOSPHATE, DIBASIC, AND POTASSIUM PHOSPHATE 100 ML/HR: .53; .5; .37; .037; .03; .012; .00082 INJECTION, SOLUTION INTRAVENOUS at 14:14

## 2018-12-16 RX ADMIN — HEPARIN SODIUM 5000 UNITS: 5000 INJECTION INTRAVENOUS; SUBCUTANEOUS at 21:41

## 2018-12-16 RX ADMIN — CLOPIDOGREL BISULFATE 75 MG: 75 TABLET ORAL at 09:01

## 2018-12-16 RX ADMIN — AMPICILLIN SODIUM 2000 MG: 2 INJECTION, POWDER, FOR SOLUTION INTRAMUSCULAR; INTRAVENOUS at 09:32

## 2018-12-16 RX ADMIN — INSULIN LISPRO 1 UNITS: 100 INJECTION, SOLUTION INTRAVENOUS; SUBCUTANEOUS at 17:06

## 2018-12-16 RX ADMIN — MELATONIN 6 MG: at 21:41

## 2018-12-16 RX ADMIN — TACROLIMUS 1 MG: 1 CAPSULE, GELATIN COATED ORAL at 21:01

## 2018-12-16 NOTE — ASSESSMENT & PLAN NOTE
Unclear etiology, possibly multifactorial related to acute kidney injury and hyponatremia together with possible infection cause such as meningitis  Patient was started on acyclovir, ampicillin, Rocephin, vancomycin  Encephalopathy is currently resolved although, patient has moment of increased confusion according to family on a background of memory loss    Status post lumbar puncture 12/14, WBC elevated although, bloody tap with 5000 RBCs,, glucose is low, protein are elevated  PCR negative and culture negative so far with negative Gram stain  Continue antibiotics and antiviral as above for now  Further recommendation pending follow-up with Infectious Disease on Monday December 17  MRI is pending as well----> scheduled for tonight  As per family, patient mental status currently is at his baseline

## 2018-12-16 NOTE — PROGRESS NOTES
Vancomycin IV Pharmacy-to-Dose Consultation    Sindy Sage is a 77 y o  male who is currently receiving Vancomycin IV with management by the Pharmacy Consult service  Assessment/Plan:  The patient was reviewed  Renal function is stable and no signs or symptoms of nephrotoxicity and/or infusion reactions were documented in the chart  Based on todays assessment, continue current vancomycin (day # 6) dosing of 1750mg q24h, with a plan for trough to be drawn at 2300 on 12/17  We will continue to follow the patients culture results and clinical progress daily      Dagoberto Herbert, Pharmacist

## 2018-12-16 NOTE — PROGRESS NOTES
Progress Note - Infectious Disease   Sindy Sage 77 y o  male MRN: [id number]  Unit/Bed#: [facility] 807-01 Encounter: [id number]      Impression/Plan:  1  Fever and encephalopathy-unclear etiology   Certainly with associated headache, and in his immunosuppressed state, 1 must consider the possibility of an acute CNS infection such as meningitis or encephalitis  [facility] [place], the symptomatology seems to be markedly improved within well less than 24 hr which would make these CNS infections less likely   He continues to improve   Consideration for the possibility of encephalopathy associated with another infectious etiology such as a occult bacterial infection   Consideration for the possibility of medication effect  Fortunately, the patient remains hemodynamically stable and nontoxic despite his systemic illness   He seems to be tolerating the antibiotics well without difficulty  Lumbar puncture revealed 6 WBCs, but suggestive of bloody tap  Glucose was 48, protein 115  Gram stain negative, culture remains negative  Comprehensive CSF PCR negative  Suspicion for acute CNS infection remains low   -observe off anymore antibiotics and antiviral  -close neurology follow-up  -recheck CBC with diff, and creatinine tomorrow     2  Sepsis-POACharles Apolinar and tachycardia   Fortunately the patient remains hemodynamically stable and nontoxic despite the systemic illness   Unclear etiology at this point   Procalcitonin level is repeatedly normal   The blood cultures remain negative   -observe off antibiotics, as above  -follow-up blood cultures  -workup as above  -supportive care     3  Acute kidney injury-the setting of chronic kidney disease   Suspected pre renal issue   The renal function has improved with IV hydration and has now stabilized  -monitor the GFR  -dose adjusted antibiotics as above  -for volume management        4  Liver transplantation-on immunosuppressive therapy           Antibiotics:  Ceftriaxone 6  Ampicillin 6  Vancomycin 6  Acyclovir 6    Subjective:  No acute overnight events  Mild frontal visual disturbances, neck pain, stiffness  Denies fevers, chills, or sweats  Denies nausea, vomiting, or diarrhea  Objective:  Vitals:  Temp:  [98 °F (36 7 °C)-98 7 °F (37 1 °C)] 98 °F (36 7 °C)  HR:  [78-89] 78  Resp:  [18] 18  BP: (128-147)/(68-77) 143/68  SpO2:  [97 %-100 %] 100 %  Temp (24hrs), Av 3 °F (36 8 °C), Min:98 °F (36 7 °C), Max:98 7 °F (37 1 °C)  Current: Temperature: 98 °F (36 7 °C)    Physical Exam:   General:  No acute distress  Psychiatric:  Awake and alert  Pulmonary:  Normal respiratory excursion without accessory muscle use  Abdomen:  Soft, nontender  Extremities:  No edema  Skin:  No rashes    Lab Results:  I have personally reviewed pertinent labs  Results from last 7 days  Lab Units 18  0459 18  1103 18  0456 18  0600 18  1413   POTASSIUM mmol/L 3 0* 3 2* 3 1* 3 9 4 3   CHLORIDE mmol/L 105 107 103 103 94*   CO2 mmol/L 23 23 22 25 22   BUN mg/dL 12 16 20 22 24   CREATININE mg/dL 1 42* 1 46* 1 51* 1 51* 2 00*   EGFR ml/min/1 73sq m 50 48 46 46 33   CALCIUM mg/dL 7 2* 8 7 8 6 7 7* 9 8   AST U/L  --   --   --  42 34   ALT U/L  --   --   --  27 29   ALK PHOS U/L  --   --   --  122* 155*       Results from last 7 days  Lab Units 18  0459 18  1103 18  0456   WBC Thousand/uL 7 60 11 46* 12 29*   HEMOGLOBIN g/dL 9 5* 11 2* 10 7*   PLATELETS Thousands/uL 121* 139* 131*       Results from last 7 days  Lab Units 18  1445 18  1222 18  1203 18  1414   BLOOD CULTURE   --   --  No Growth After 4 Days  No Growth After 4 Days  No Growth After 4 Days  GRAM STAIN RESULT  Rare Polys  Rare Mononuclear Cells  No bacteria seen  --   --   --    INFLUENZA B PCR   --  None Detected  --   --    RSV PCR   --  None Detected  --   --        Imaging Studies:   I have personally reviewed pertinent imaging study reports and images in PACS      EKG, Pathology, and Other Studies:   I have personally reviewed pertinent reports

## 2018-12-16 NOTE — ASSESSMENT & PLAN NOTE
Lab Results   Component Value Date    HGBA1C 8 0 (H) 12/06/2017       Recent Labs      12/15/18   1619  12/15/18   2104  12/16/18   0635  12/16/18   1125   POCGLU  220*  104  71  86       Blood Sugar Average: Last 72 hrs:  (P) 482 [id number]   again hypoglycemia noted increasing the dose of Lantus HS  Decrease Lantus to 4 units HS, start Humalog 1 unit t i d   With meals  Patient presented with glycemia at 471 with an A1c of 8

## 2018-12-16 NOTE — ASSESSMENT & PLAN NOTE
Baseline creatinine 1 6-1 7    Presented with creatinine about 2, acute kidney injury currently resolved  Continue with IV fluids, isolyte, as patient remains on acyclovir to avoid possible nephrotoxicity  BMP in the morning  Yesterday creatinine 1 42

## 2018-12-16 NOTE — PROGRESS NOTES
Progress Note - Hector Pages 1950, 77 y o  male MRN: [id number]    Unit/Bed#: [facility] 807-01 Encounter: [id number]    Primary Care Provider: Estefani Lee DO   Date and time admitted to hospital: 12/11/2018  1:28 PM        Hyponatremia   Assessment & Plan    Likely related vomiting and hypovolemia  Sodium last 140     Acute renal failure superimposed on stage 3 chronic kidney disease ([facility] [address] )   Assessment & Plan    Baseline creatinine 1 6-1 7  Presented with creatinine about 2, acute kidney injury currently resolved  Continue with IV fluids, isolyte, as patient remains on acyclovir to avoid possible nephrotoxicity  BMP in the morning  Yesterday creatinine 1 42     Sepsis ([facility] [address] )   Assessment & Plan    Resolving, no longer febrile, WBC remains elevated but with down trend, antibiotics as per principal plan     Personal history of transient ischemic attack (TIA), and cerebral infarction without residual deficits   Assessment & Plan    Continue with Plavix     Liver replaced by transplant [facility])   Assessment & Plan    Continue Prograf 1 mg Q 12  LFTs are stable  Discussed with Gastroenterology, continue immunosuppressant as per home dose, tacrolimus level therapeutic     Controlled diabetes mellitus with diabetic neuropathy, with long-term current use of insulin [facility])   Assessment & Plan    Lab Results   Component Value Date    HGBA1C 8 0 (H) 12/06/2017       Recent Labs      12/15/18   1619  12/15/18   2104  12/16/18   0635  12/16/18   1125   POCGLU  220*  104  71  86       Blood Sugar Average: Last 72 hrs:  (P) 009 [id number]   again hypoglycemia noted increasing the dose of Lantus HS  Decrease Lantus to 4 units HS, start Humalog 1 unit t i d   With meals  Patient presented with glycemia at 471 with an A1c of 8     * Encephalopathy acute   Assessment & Plan    Unclear etiology, possibly multifactorial related to acute kidney injury and hyponatremia together with possible infection cause such as meningitis  Patient was started on acyclovir, ampicillin, Rocephin, vancomycin  Encephalopathy is currently resolved although, patient has moment of increased confusion according to family on a background of memory loss    Status post lumbar puncture , WBC elevated although, bloody tap with 5000 RBCs,, glucose is low, protein are elevated  PCR negative and culture negative so far with negative Gram stain  Continue antibiotics and antiviral as above for now  Further recommendation pending follow-up with Infectious Disease on   MRI is pending as well----> scheduled for tonight  As per family, patient mental status currently is at his baseline         VTE Pharmacologic Prophylaxis: yes  Pharmacologic: Heparin  Mechanical VTE Prophylaxis in Place: Yes    Patient Centered Rounds: I have performed bedside rounds with nursing staff today  Discussions with Specialists or Other Care Team Provider:  None    Education and Discussions with Family / Patient:  Patient, sister    Time Spent for Care: 45 minutes  More than 50% of total time spent on counseling and coordination of care as described above  Current Length of Stay: 5 day(s)    Current Patient Status: Inpatient   Certification Statement: The patient will continue to require additional inpatient hospital stay due to Please refer to above    Discharge Plan: To be determined, likely home next few days    Code Status: Level 1 - Full Code      Subjective:   Fatigue but better today  He has no complaints or concerns for me  He would like to go home tomorrow if possible    Objective:     Vitals:   Temp (24hrs), Av 2 °F (36 8 °C), Min:98 °F (36 7 °C), Max:98 7 °F (37 1 °C)    Temp:  [98 °F (36 7 °C)-98 7 °F (37 1 °C)] 98 °F (36 7 °C)  HR:  [78-89] 80  Resp:  [18] 18  BP: (143-152)/(68-78) 152/78  SpO2:  [98 %-100 %] 98 %  Body mass index is 21 9 kg/m²  Input and Output Summary (last 24 hours):        Intake/Output Summary (Last 24 hours) at 18 425 [facility] filed at 12/16/18 1501   Gross per 24 hour   Intake          3789 99 ml   Output                0 ml   Net          3789 99 ml       Physical Exam:     Physical Exam   Constitutional: He is oriented to person, place, and time  He appears well-developed  Cardiovascular: Normal rate, regular rhythm and normal heart sounds  Exam reveals no friction rub  No murmur heard  Pulmonary/Chest: Effort normal  No respiratory distress  He has no wheezes  He has no rales  Abdominal: Soft  He exhibits no distension  There is no tenderness  There is no rebound  Musculoskeletal: He exhibits no edema  Neurological: He is alert and oriented to person, place, and time  He exhibits normal muscle tone  Skin: Skin is warm  Psychiatric: He has a normal mood and affect  Additional Data:     Labs:      Results from last 7 days  Lab Units 12/16/18  0459   WBC Thousand/uL 7 60   HEMOGLOBIN g/dL 9 5*   HEMATOCRIT % 29 8*   PLATELETS Thousands/uL 121*   NEUTROS PCT % 58   LYMPHS PCT % 27   MONOS PCT % 9   EOS PCT % 6       Results from last 7 days  Lab Units 12/16/18  0459  12/12/18  0600   POTASSIUM mmol/L 3 0*  < > 3 9   CHLORIDE mmol/L 105  < > 103   CO2 mmol/L 23  < > 25   BUN mg/dL 12  < > 22   CREATININE mg/dL 1 42*  < > 1 51*   CALCIUM mg/dL 7 2*  < > 7 7*   ALK PHOS U/L  --   --  122*   ALT U/L  --   --  27   AST U/L  --   --  42   < > = values in this interval not displayed  Results from last 7 days  Lab Units 12/14/18  1102   INR  1 15       * I Have Reviewed All Lab Data Listed Above  * Additional Pertinent Lab Tests Reviewed: All Labs Within Last 24 Hours Reviewed    Imaging:    Imaging Reports Reviewed Today Include:  None  Imaging Personally Reviewed by Myself Includes:  None    Recent Cultures (last 7 days):       Results from last 7 days  Lab Units 12/14/18  1445 12/12/18  1222 12/12/18  1203 12/11/18  1414   BLOOD CULTURE   --   --  No Growth After 4 Days  No Growth After 4 Days   No Growth After 4 Days  GRAM STAIN RESULT  Rare Polys  Rare Mononuclear Cells  No bacteria seen  --   --   --    INFLUENZA B PCR   --  None Detected  --   --    RSV PCR   --  None Detected  --   --        Last 24 Hours Medication List:     Current Facility-Administered Medications:  acetaminophen 650 mg Oral Q6H PRN Ben Sinha PA-C    clopidogrel 75 mg Oral Daily Kostas Bunch MD    heparin (porcine) 5,000 Units Subcutaneous [facility] Clau Cunningham MD    insulin glargine 4 Units Subcutaneous HS Kostas Bunch MD    insulin lispro 1 Units Subcutaneous TID With Meals Kostas Bunch MD    melatonin 6 mg Oral HS Kostas Bunch MD    multi-electrolyte 75 mL/hr Intravenous Continuous Kostas Bunch MD Last Rate: 100 mL/hr (12/16/18 1414)   OLANZapine 2 5 mg Intramuscular Q8H PRN Kostas Bunch MD    tacrolimus 1 mg Oral Q12H Radha Mills MD         Today, Patient Was Seen By: Kostas Bunch MD    ** Please Note: Dragon 360 Dictation voice to text software may have been used in the creation of this document   **

## 2018-12-17 ENCOUNTER — TELEPHONE (OUTPATIENT)
Dept: FAMILY MEDICINE CLINIC | Facility: CLINIC | Age: 68
End: 2018-12-17

## 2018-12-17 ENCOUNTER — APPOINTMENT (INPATIENT)
Dept: RADIOLOGY | Facility: HOSPITAL | Age: 68
DRG: 064 | End: 2018-12-17
Payer: MEDICARE

## 2018-12-17 ENCOUNTER — APPOINTMENT (INPATIENT)
Dept: NON INVASIVE DIAGNOSTICS | Facility: HOSPITAL | Age: 68
DRG: 064 | End: 2018-12-17
Payer: MEDICARE

## 2018-12-17 DIAGNOSIS — Z71.89 COMPLEX CARE COORDINATION: Primary | ICD-10-CM

## 2018-12-17 PROBLEM — I63.81 LEFT THALAMIC INFARCTION (HCC): Status: ACTIVE | Noted: 2018-12-17

## 2018-12-17 PROBLEM — I63.9 LEFT THALAMIC INFARCTION (HCC): Status: ACTIVE | Noted: 2018-12-17

## 2018-12-17 LAB
ANION GAP SERPL CALCULATED.3IONS-SCNC: 9 MMOL/L (ref 4–13)
BACTERIA BLD CULT: NORMAL
BACTERIA BLD CULT: NORMAL
BACTERIA CSF CULT: NO GROWTH
BASOPHILS # BLD AUTO: 0.04 THOUSANDS/ΜL (ref 0–0.1)
BASOPHILS NFR BLD AUTO: 1 % (ref 0–1)
BUN SERPL-MCNC: 12 MG/DL (ref 5–25)
C DIFF TOX GENS STL QL NAA+PROBE: NORMAL
CALCIUM SERPL-MCNC: 8.1 MG/DL (ref 8.3–10.1)
CHLORIDE SERPL-SCNC: 103 MMOL/L (ref 100–108)
CO2 SERPL-SCNC: 23 MMOL/L (ref 21–32)
CREAT SERPL-MCNC: 1.47 MG/DL (ref 0.6–1.3)
EOSINOPHIL # BLD AUTO: 0.73 THOUSAND/ΜL (ref 0–0.61)
EOSINOPHIL NFR BLD AUTO: 8 % (ref 0–6)
ERYTHROCYTE [DISTWIDTH] IN BLOOD BY AUTOMATED COUNT: 14.4 % (ref 11.6–15.1)
EST. AVERAGE GLUCOSE BLD GHB EST-MCNC: 206 MG/DL
GFR SERPL CREATININE-BSD FRML MDRD: 48 ML/MIN/1.73SQ M
GLUCOSE SERPL-MCNC: 124 MG/DL (ref 65–140)
GLUCOSE SERPL-MCNC: 131 MG/DL (ref 65–140)
GLUCOSE SERPL-MCNC: 132 MG/DL (ref 65–140)
GLUCOSE SERPL-MCNC: 155 MG/DL (ref 65–140)
GLUCOSE SERPL-MCNC: 164 MG/DL (ref 65–140)
HBA1C MFR BLD: 8.8 % (ref 4.2–6.3)
HCT VFR BLD AUTO: 32 % (ref 36.5–49.3)
HGB BLD-MCNC: 10.1 G/DL (ref 12–17)
IMM GRANULOCYTES # BLD AUTO: 0.02 THOUSAND/UL (ref 0–0.2)
IMM GRANULOCYTES NFR BLD AUTO: 0 % (ref 0–2)
LYMPHOCYTES # BLD AUTO: 1.96 THOUSANDS/ΜL (ref 0.6–4.47)
LYMPHOCYTES NFR BLD AUTO: 22 % (ref 14–44)
MAGNESIUM SERPL-MCNC: 1.9 MG/DL (ref 1.6–2.6)
MCH RBC QN AUTO: 24 PG (ref 26.8–34.3)
MCHC RBC AUTO-ENTMCNC: 31.6 G/DL (ref 31.4–37.4)
MCV RBC AUTO: 76 FL (ref 82–98)
MONOCYTES # BLD AUTO: 0.79 THOUSAND/ΜL (ref 0.17–1.22)
MONOCYTES NFR BLD AUTO: 9 % (ref 4–12)
NEUTROPHILS # BLD AUTO: 5.2 THOUSANDS/ΜL (ref 1.85–7.62)
NEUTS SEG NFR BLD AUTO: 60 % (ref 43–75)
NRBC BLD AUTO-RTO: 0 /100 WBCS
PHOSPHATE SERPL-MCNC: 2.6 MG/DL (ref 2.3–4.1)
PLATELET # BLD AUTO: 121 THOUSANDS/UL (ref 149–390)
PMV BLD AUTO: 12.8 FL (ref 8.9–12.7)
POTASSIUM SERPL-SCNC: 3.5 MMOL/L (ref 3.5–5.3)
RBC # BLD AUTO: 4.21 MILLION/UL (ref 3.88–5.62)
SODIUM SERPL-SCNC: 135 MMOL/L (ref 136–145)
WBC # BLD AUTO: 8.74 THOUSAND/UL (ref 4.31–10.16)

## 2018-12-17 PROCEDURE — 84100 ASSAY OF PHOSPHORUS: CPT | Performed by: INTERNAL MEDICINE

## 2018-12-17 PROCEDURE — 70544 MR ANGIOGRAPHY HEAD W/O DYE: CPT

## 2018-12-17 PROCEDURE — 93306 TTE W/DOPPLER COMPLETE: CPT

## 2018-12-17 PROCEDURE — 99232 SBSQ HOSP IP/OBS MODERATE 35: CPT | Performed by: INTERNAL MEDICINE

## 2018-12-17 PROCEDURE — 82948 REAGENT STRIP/BLOOD GLUCOSE: CPT

## 2018-12-17 PROCEDURE — 70549 MR ANGIOGRAPH NECK W/O&W/DYE: CPT

## 2018-12-17 PROCEDURE — 83036 HEMOGLOBIN GLYCOSYLATED A1C: CPT | Performed by: INTERNAL MEDICINE

## 2018-12-17 PROCEDURE — 83735 ASSAY OF MAGNESIUM: CPT | Performed by: INTERNAL MEDICINE

## 2018-12-17 PROCEDURE — 93306 TTE W/DOPPLER COMPLETE: CPT | Performed by: INTERNAL MEDICINE

## 2018-12-17 PROCEDURE — 85025 COMPLETE CBC W/AUTO DIFF WBC: CPT | Performed by: INTERNAL MEDICINE

## 2018-12-17 PROCEDURE — 80048 BASIC METABOLIC PNL TOTAL CA: CPT | Performed by: INTERNAL MEDICINE

## 2018-12-17 PROCEDURE — A9577 INJ MULTIHANCE: HCPCS | Performed by: INTERNAL MEDICINE

## 2018-12-17 RX ADMIN — ATORVASTATIN CALCIUM 20 MG: 20 TABLET, FILM COATED ORAL at 15:56

## 2018-12-17 RX ADMIN — TACROLIMUS 1 MG: 1 CAPSULE, GELATIN COATED ORAL at 09:11

## 2018-12-17 RX ADMIN — TACROLIMUS 1 MG: 1 CAPSULE, GELATIN COATED ORAL at 21:31

## 2018-12-17 RX ADMIN — HEPARIN SODIUM 5000 UNITS: 5000 INJECTION INTRAVENOUS; SUBCUTANEOUS at 05:21

## 2018-12-17 RX ADMIN — MELATONIN 6 MG: at 21:06

## 2018-12-17 RX ADMIN — HEPARIN SODIUM 5000 UNITS: 5000 INJECTION INTRAVENOUS; SUBCUTANEOUS at 21:31

## 2018-12-17 RX ADMIN — HEPARIN SODIUM 5000 UNITS: 5000 INJECTION INTRAVENOUS; SUBCUTANEOUS at 14:15

## 2018-12-17 RX ADMIN — INSULIN GLARGINE 4 UNITS: 100 INJECTION, SOLUTION SUBCUTANEOUS at 21:07

## 2018-12-17 RX ADMIN — TEMAZEPAM 30 MG: 15 CAPSULE ORAL at 21:05

## 2018-12-17 RX ADMIN — GADOBENATE DIMEGLUMINE 10 ML: 529 INJECTION, SOLUTION INTRAVENOUS at 17:55

## 2018-12-17 RX ADMIN — CLOPIDOGREL BISULFATE 75 MG: 75 TABLET ORAL at 09:11

## 2018-12-17 RX ADMIN — INSULIN LISPRO 1 UNITS: 100 INJECTION, SOLUTION INTRAVENOUS; SUBCUTANEOUS at 11:52

## 2018-12-17 RX ADMIN — SODIUM CHLORIDE, SODIUM GLUCONATE, SODIUM ACETATE, POTASSIUM CHLORIDE, MAGNESIUM CHLORIDE, SODIUM PHOSPHATE, DIBASIC, AND POTASSIUM PHOSPHATE 75 ML/HR: .53; .5; .37; .037; .03; .012; .00082 INJECTION, SOLUTION INTRAVENOUS at 06:06

## 2018-12-17 RX ADMIN — ACETAMINOPHEN 650 MG: 325 TABLET, FILM COATED ORAL at 15:56

## 2018-12-17 NOTE — PROGRESS NOTES
Met with patient and wife Ryan Pizarro to discuss follow up care with outpatient neurology  Plan for discharge per chart and team is home with supportive therapies  Wife manages patient's medications  They are both agreeable to follow up  I provided them with a stroke education booklet and reviewed types of strokes, medications, and risk factors with them as well as stroke symptoms  Patient and wife verbalizes understanding  Wife states she may have questions regarding discharge medications, I provided her my card for questions once discharged  Neither patient nor wife have any other questions or concerns at this time  Will follow up after discharge

## 2018-12-17 NOTE — CONSULTS
PATIENT NAME: Idania Cummins,  YOB: 1950  MEDICAL RECORD NUMBER: [id number]  Neurology Follow up Note     Following patient for: Encephalopathy    Overnight Events: None    Subjective: Patient underwent MRI brain today  He states he feels well  He was walking in the hallway  Denies new complaints  No headache, visual disturbance, confusion  Scheduled Meds:  Current Facility-Administered Medications:  acetaminophen 650 mg Oral Q6H PRN Alia Leger PA-C    atorvastatin 20 mg Oral Daily With Verenice Kimball MD    clopidogrel 75 mg Oral Daily Eugune Libman, MD    heparin (porcine) 5,000 Units Subcutaneous [facility] Robinson Pagan MD    insulin glargine 4 Units Subcutaneous HS Eugune Libman, MD    insulin lispro 1 Units Subcutaneous TID With Meals Eugune Libman, MD    melatonin 6 mg Oral HS Eugune Libman, MD    multi-electrolyte 75 mL/hr Intravenous Continuous Eugune Libman, MD Last Rate: 75 mL/hr (12/16/18 1657)   OLANZapine 2 5 mg Intramuscular Q8H PRN Eugune Libman, MD    tacrolimus 1 mg Oral Q12H [address] Robinson Pagan MD    temazepam 30 mg Oral HS PRN Eugune Libman, MD      Continuous Infusions:  multi-electrolyte 75 mL/hr Last Rate: 75 mL/hr (12/16/18 1657)     PRN Meds:   acetaminophen    OLANZapine    temazepam      Objective  /78 (BP Location: Left arm)   Pulse 80   Temp 98 °F (36 7 °C) (Oral)   Resp 18   Ht 5' 4" (1 626 m)   Wt 57 9 kg (127 lb 9 6 oz)   SpO2 98%   BMI 21 90 kg/m²   GEN: Comfortable, NAD  HEENT: NC/AT  Anicteric  PPC  MMM   CVS: RRR  S1S2  No M/R/G    LUNGS: B/L entry, no wheezes, rhonchi or rales  EXT: B/L pulses, no edema  Mental Status: The patient was awake, alert, attentive, oriented to person, place, and time  Recent and remote memory intact to conversation with no evidence of language dysfunction  Satisfactory fund of knowledge  Normal attention span and concentration  Able to name, repeat, describe a complex scene  Cranial Nerves:   I: smell Not tested   II: visual fields Full to confrontation  Pupils equal, round, reactive to light with normal accomodation  Fundus: normal cup to disc ratio with no edema  III,IV,VI: extraocular muscles EOMI, no nystagmus   V: masseter and pterygoid strength  Sensation in the V1 through V3 distributions intact to pinprick and light touch bilaterally  VII: Face is symmetric with no weakness noted  VIII: Audition intact to finger rub bilaterally  IX/X: Uvula midline  Soft palate elevation symmetric  XI: Trapezius and SCM strength 5/5 B/L  XII: Tongue midline with no atrophy or fasciculations with appropriate movement  Motor Examination:   Bulk: Normal  No atrophy  Tone: Normal   Fasciculations: None  Shoulder Shoulder Elbow    Elbow Wrist    Wrist    Intrinsics   Abduction Adduction Flexion    Ext  Flexion    Ext  Right 5  5  5    5  5    5    5    Left 5  5  5    5  5    5    5            Hip   Hip Knee    Knee      Plantar Dorsi   Flexion   Ext  Flexion    Ext  Flexion Flexion   Right   5   5 5    5      5  5  Left   5   5 5    5      5  5         Reflexes:                   Biceps Brachioradialis Triceps Patella Achilles Plantars   Right          2+            2+                  2+        2+       2+         Down   Left            2+             2+                 2+         2+       2+         Down       Coordination: Patient able to perform normal finger-to-nose and heel to shin appropriately  Normal rapid alternating movements  Sensory: Normal sensation to light touch, pin prick and vibratory sensation throughout  Gait:normal stance and posture, normal stride length and arm swing, normal turn around  Patient able to perform tandem gait without difficulty  Able toe walk and heel walk without difficulty  Romberg negative        Recent Results (from the past 24 hour(s))   Fingerstick Glucose (POCT)    Collection Time: 12/15/18  9:04 PM   Result Value Ref Range    POC Glucose 104 65 - 140 mg/dl   CBC and differential    Collection Time: 12/16/18  4:59 AM   Result Value Ref Range    WBC 7 60 4 31 - 10 16 Thousand/uL    RBC 3 92 3 88 - 5 62 Million/uL    Hemoglobin 9 5 (L) 12 0 - 17 0 g/dL    Hematocrit 29 8 (L) 36 5 - 49 3 %    MCV 76 (L) 82 - 98 fL    MCH 24 2 (L) 26 8 - 34 3 pg    MCHC 31 9 31 4 - 37 4 g/dL    RDW 14 5 11 6 - 15 1 %    MPV 12 7 8 9 - 12 7 fL    Platelets 171 (L) 074 - 390 Thousands/uL    nRBC 0 /100 WBCs    Neutrophils Relative 58 43 - 75 %    Immat GRANS % 0 0 - 2 %    Lymphocytes Relative 27 14 - 44 %    Monocytes Relative 9 4 - 12 %    Eosinophils Relative 6 0 - 6 %    Basophils Relative 0 0 - 1 %    Neutrophils Absolute 4 37 1 85 - 7 62 Thousands/µL    Immature Grans Absolute 0 02 0 00 - 0 20 Thousand/uL    Lymphocytes Absolute 2 07 0 60 - 4 47 Thousands/µL    Monocytes Absolute 0 68 0 17 - 1 22 Thousand/µL    Eosinophils Absolute 0 43 0 00 - 0 61 Thousand/µL    Basophils Absolute 0 03 0 00 - 0 10 Thousands/µL   Basic metabolic panel    Collection Time: 12/16/18  4:59 AM   Result Value Ref Range    Sodium 140 136 - 145 mmol/L    Potassium 3 0 (L) 3 5 - 5 3 mmol/L    Chloride 105 100 - 108 mmol/L    CO2 23 21 - 32 mmol/L    ANION GAP 12 4 - 13 mmol/L    BUN 12 5 - 25 mg/dL    Creatinine 1 42 (H) 0 60 - 1 30 mg/dL    Glucose 53 (L) 65 - 140 mg/dL    Calcium 7 2 (L) 8 3 - 10 1 mg/dL    eGFR 50 ml/min/1 73sq m   Magnesium    Collection Time: 12/16/18  4:59 AM   Result Value Ref Range    Magnesium 1 5 (L) 1 6 - 2 6 mg/dL   Phosphorus    Collection Time: 12/16/18  4:59 AM   Result Value Ref Range    Phosphorus 2 5 2 3 - 4 1 mg/dL   Ferritin    Collection Time: 12/16/18  4:59 AM   Result Value Ref Range    Ferritin 216 8 - 388 ng/mL   Fingerstick Glucose (POCT)    Collection Time: 12/16/18  6:35 AM   Result Value Ref Range    POC Glucose 71 65 - 140 mg/dl   Iron Saturation %    Collection Time: 12/16/18 10:50 AM   Result Value Ref Range    Iron Saturation 34 %    TIBC 177 (L) 250 - 450 ug/dL    Iron 61 (L) 65 - 175 ug/dL   Fingerstick Glucose (POCT)    Collection Time: 12/16/18 11:25 AM   Result Value Ref Range    POC Glucose 86 65 - 140 mg/dl   Fingerstick Glucose (POCT)    Collection Time: 12/16/18  4:00 PM   Result Value Ref Range    POC Glucose 133 65 - 140 mg/dl       MRI images personally reviewed: there is a focus of punctate diffusion restriction in the L thalamus with an ADC mapping correlate  There is periventricular white matter signal consistent with chronic microvascular disease  A/P  77 y o  man with DM, liver transplant, prior stroke, prior tobacco use and polysubstance/ETOH abuse, initially evaluated for encephalopathy, suspicious for meningitis as patient had fever, headache, and progression of confusion  LP results were benign  No longer on antibiotics  ID recommendations concur  MRI today, unexpectedly shows a lacunar stroke in the L thalamus  Patient's transient encephalopathy possibly a consequence of disconnection syndrome  He does have vascular risk factors, including DM and tobacco use  Recommend stroke pathway: MRA H&N, TTE, continue on plavix, A1c, lipid panel  Clinically he is doing well  Will follow the stroke workup

## 2018-12-17 NOTE — TELEPHONE ENCOUNTER
PATIENT WAS  ADMITTED TO [facility]  ADMITTED 12/11/2018  D/C   12/17/2018    DX ENCEPHALOPATHY - ACUTE  DISCHARGE TO HOME    SCHEDULED  HIGH RISK APPOINTMENT  WITH JENNIFER  12/19/2018

## 2018-12-17 NOTE — ASSESSMENT & PLAN NOTE
Baseline creatinine 1 6-1 7  Presented with creatinine about 2, acute kidney injury currently resolved with IV fluid some most likely prerenal   Continue to monitor

## 2018-12-17 NOTE — PROGRESS NOTES
Progress Note - Elzie Buerger 1950, 77 y o  male MRN: [id number]    Unit/Bed#: [facility] 703-01 Encounter: [id number]    Primary Care Provider: Navid Centeno,    Date and time admitted to hospital: 12/11/2018  1:28 PM        * Encephalopathy acute   Assessment & Plan    Unclear etiology, possibly multifactorial related to acute kidney injury and hyponatremia together with possible infection cause such as meningitis  Patient was started on acyclovir, ampicillin, Rocephin, vancomycin - now stopped as per ID  Encephalopathy is currently resolved although, patient has moment of increased confusion according to family on a background of memory loss  Status post lumbar puncture 12/14, WBC elevated although, bloody tap with 5000 RBCs,, glucose is low, protein are elevated  PCR negative and culture negative so far with negative Gram stain  Encephalopathy resolved for now  Patient at baseline  As per Neurology could be 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)   Assessment & Plan    Seen on the MRI brain yesterday  Appreciate Neurology input  Stroke pathway  MRA head and neck ordered  Pending  Echo done this morning  Pending read  Appreciate Neurology input  Continue Plavix and statin  Patient has been walking around without any problems  He does not have any symptoms at this point from stroke  No neurological deficits  Do not feel that patient needs PT OT evaluation as he seeing that he is back to baseline and has been walking independently in the hallways  Acute renal failure superimposed on stage 3 chronic kidney disease (HCC)   Assessment & Plan    Baseline creatinine 1 6-1 7  Presented with creatinine about 2, acute kidney injury currently resolved with IV fluid some most likely prerenal   Continue to monitor  Sepsis ([facility] [address] )   Assessment & Plan    POA  Resolved       Liver transplant status (HCC)   Assessment & Plan    Continue Prograf 1 mg Q 12   LFTs are stable  Discussed with Gastroenterology, continue immunosuppressant as per home dose, tacrolimus level therapeutic     Controlled diabetes mellitus with diabetic neuropathy, with long-term current use of insulin [facility])   Assessment & Plan    Lab Results   Component Value Date    HGBA1C 8 8 (H) 2018       Recent Labs      18   1600  18   2115  18   0639  18   1110   POCGLU  133  148*  131  132       Blood Sugar Average: Last 72 hrs:  (P) 319 [id number]     Blood glucose well controlled today  Cont Lantus to 4 units HS, with  Humalog 1 unit t i d  With meals         VTE Pharmacologic Prophylaxis:   Pharmacologic: Heparin  Mechanical VTE Prophylaxis in Place: Yes    Patient Centered Rounds: I have performed bedside rounds with nursing staff today  Discussions with Specialists or Other Care Team Provider: neuro    Education and Discussions with Family / Patient: ptient    Time Spent for Care: 45 minutes  More than 50% of total time spent on counseling and coordination of care as described above  Current Length of Stay: 6 day(s)    Current Patient Status: Inpatient   Certification Statement: The patient will continue to require additional inpatient hospital stay due to above    Discharge Plan: pending MRA    Code Status: Level 1 - Full Code      Subjective:   Pt seen and examined by me this morning  Pt denied any complaints  He is awake and alert  Back to his baseline  He feels that he is back to his normal self  Really wanted to go home and was not happy that he has to stay for MRA  Objective:     Vitals:   Temp (24hrs), Av 7 °F (37 1 °C), Min:98 °F (36 7 °C), Max:99 1 °F (37 3 °C)    Temp:  [98 °F (36 7 °C)-99 1 °F (37 3 °C)] 98 7 °F (37 1 °C)  HR:  [70-82] 76  Resp:  [16-18] 16  BP: (146-195)/(69-96) 161/86  SpO2:  [96 %-100 %] 100 %  Body mass index is 22 18 kg/m²  Input and Output Summary (last 24 hours):        Intake/Output Summary (Last 24 hours) at 12/17/18 1401  Last data filed at 12/17/18 1101   Gross per 24 hour   Intake          2948 74 ml   Output                0 ml   Net          2948 74 ml       Physical Exam:     Physical Exam    Constitutional: Pt appears well-developed and well-nourished  Not in any acute distress  HENT:   Head: Normocephalic and atraumatic  Eyes: EOM are normal    Neck: Neck supple  Cardiovascular: Normal rate, regular rhythm, normal heart sounds  Exam reveals no gallop and no friction rub  No murmur heard  Pulmonary/Chest: Effort normal and breath sounds normal  No respiratory distress  Pt has no wheezes or rales  Abdominal: Soft  Non-distended, Non-tender  Bowel sounds are normal    Musculoskeletal: Normal range of motion  Neurological: alert and oriented to person, place, and time  Normal strength and sensations  Psychiatric: normal mood and affect  Additional Data:     Labs:      Results from last 7 days  Lab Units 12/17/18  0523   WBC Thousand/uL 8 74   HEMOGLOBIN g/dL 10 1*   HEMATOCRIT % 32 0*   PLATELETS Thousands/uL 121*   NEUTROS PCT % 60   LYMPHS PCT % 22   MONOS PCT % 9   EOS PCT % 8*       Results from last 7 days  Lab Units 12/17/18  0523  12/12/18  0600   SODIUM mmol/L 135*  < > 137   POTASSIUM mmol/L 3 5  < > 3 9   CHLORIDE mmol/L 103  < > 103   CO2 mmol/L 23  < > 25   BUN mg/dL 12  < > 22   CREATININE mg/dL 1 47*  < > 1 51*   ANION GAP mmol/L 9  < > 9   CALCIUM mg/dL 8 1*  < > 7 7*   ALBUMIN g/dL  --   --  3 2*   TOTAL BILIRUBIN mg/dL  --   --  0 48   ALK PHOS U/L  --   --  122*   ALT U/L  --   --  27   AST U/L  --   --  42   GLUCOSE RANDOM mg/dL 155*  < > 71   < > = values in this interval not displayed      Results from last 7 days  Lab Units 12/14/18  1102   INR  1 15       Results from last 7 days  Lab Units 12/17/18  1110 12/17/18  0639 12/16/18  2115 12/16/18  1600 12/16/18  1125 12/16/18  7896 12/15/18  2104 12/15/18  1619 12/15/18  1233 12/15/18  1116 12/15/18  0726 12/14/18  211 POC GLUCOSE mg/dl 132 131 148* 133 86 71 104 220* 199* 218* 126 164*       Results from last 7 days  Lab Units 12/17/18  0523   HEMOGLOBIN A1C % 8 8*       Results from last 7 days  Lab Units 12/13/18  0456 12/11/18  2212 12/11/18  1834 12/11/18  1413   LACTIC ACID mmol/L 1 1 2 3* 2 8* 5 4*   PROCALCITONIN ng/ml 0 22 0 15  --   --            * I Have Reviewed All Lab Data Listed Above  * Additional Pertinent Lab Tests Reviewed: [address] Admission Reviewed    Imaging:    Imaging Reports Reviewed Today Include:   Imaging Personally Reviewed by Myself Includes:     Recent Cultures (last 7 days):       Results from last 7 days  Lab Units 12/16/18  1902 12/14/18  1445 12/12/18  1222 12/12/18  1203 12/11/18  1414   BLOOD CULTURE   --   --   --  No Growth After 4 Days  No Growth After 4 Days  No Growth After 5 Days     GRAM STAIN RESULT   --  Rare Polys  Rare Mononuclear Cells  No bacteria seen  --   --   --    INFLUENZA B PCR   --   --  None Detected  --   --    RSV PCR   --   --  None Detected  --   --    C DIFF TOXIN B  NEGATIVE for C difficle toxin by PCR    --   --   --   --        Last 24 Hours Medication List:     Current Facility-Administered Medications:  acetaminophen 650 mg Oral Q6H PRN El Martinez PA-C   atorvastatin 20 mg Oral Daily With Josseline Coornel MD   clopidogrel 75 mg Oral Daily Caro Arzola MD   heparin (porcine) 5,000 Units Subcutaneous Q8H [address] Yari Coon MD   insulin glargine 4 Units Subcutaneous HS Caro Arzola MD   insulin lispro 1 Units Subcutaneous TID With Meals Caro Arzola MD   melatonin 6 mg Oral HS Caro Arzola MD   OLANZapine 2 5 mg Intramuscular Q8H PRN Caro Arzola MD   tacrolimus 1 mg Oral Q12H Trena Velásquez MD   temazepam 30 mg Oral HS PRN Caro Arzola MD        Today, Patient Was Seen By: Ana María Ruiz MD    ** Please Note: Dictation voice to text software may have been used in the creation of this document   **

## 2018-12-17 NOTE — SOCIAL WORK
Patient identified as HRR per criteria  Call made to DC appointment hotline with information as required for CM support follow up  Outpt Care Mgt referral made

## 2018-12-17 NOTE — ASSESSMENT & PLAN NOTE
Lab Results   Component Value Date    HGBA1C 8 8 (H) 12/17/2018       Recent Labs      12/16/18   1600  12/16/18   2115  12/17/18   0639  12/17/18   1110   POCGLU  133  148*  131  132       Blood Sugar Average: Last 72 hrs:  (P) 165 [id number]     Blood glucose well controlled today  Cont Lantus to 4 units HS, with  Humalog 1 unit t i d   With meals

## 2018-12-17 NOTE — PROGRESS NOTES
Progress Note - Infectious Disease   America Snell 77 y o  male MRN: [id number]  Unit/Bed#: [facility] 703-01 Encounter: [id number]      Impression/Plan:  1  Fever and encephalopathy-possibly all secondary to acute CVA  No CSF or radiographic evidence of encephalitis  Patient is much improved cognitively  Nellie Espinoza, the symptomatology seems to be markedly improved within well less than 24 hr which would make these CNS infections less likely   He continues to improve   Consideration for the possibility of medication effect  Fortunately, the patient remains hemodynamically stable and nontoxic despite his systemic illness   He seems to be tolerating the antibiotics well without difficulty   The lumbar puncture was bloody but was otherwise normal    Comprehensive CSF PCR negative   Suspicion for acute CNS infection remains low   -observe off anymore antibiotics and antiviral  -close neurology follow-up     2  Systemic inflammatory response syndrome-POA   Fever and tachycardia   Fortunately the patient remains hemodynamically stable and nontoxic despite the systemic illness  No clear infectious etiology  Procalcitonin level is repeatedly normal   The blood cultures remain negative   -observe off antibiotics, as above  -follow-up blood cultures  -workup as above  -supportive care     3  Acute kidney injury-the setting of chronic kidney disease   Suspected pre renal issue   The renal function has improved with IV hydration and has now stabilized  -monitor the GFR  -dose adjusted antibiotics as above  -for volume management        4  Liver transplantation-on immunosuppressive therapy       Antibiotics:  None    Subjective:  Patient has no fever, chills, sweats; no nausea, vomiting, but he developed diarrhea over the last 24 hr; no cough, shortness of breath; no abdominal pain  No new symptoms    No more headache    Objective:  Vitals:  Temp:  [98 °F (36 7 °C)-99 1 °F (37 3 °C)] 98 8 °F (37 1 °C)  HR:  [70-82] 82  Resp:  [16-18] 16  BP: (146-195)/(69-96) 151/76  SpO2:  [96 %-100 %] 100 %  Temp (24hrs), Av 7 °F (37 1 °C), Min:98 °F (36 7 °C), Max:99 1 °F (37 3 °C)  Current: Temperature: 98 8 °F (37 1 °C)    Physical Exam:   General Appearance:  Alert, interactive, nontoxic, no acute distress  Throat: Oropharynx moist without lesions  Lungs:   Clear to auscultation bilaterally; no wheezes, rhonchi or rales; respirations unlabored   Heart:  RRR; no murmur, rub or gallop   Abdomen:   Soft, non-tender, non-distended, positive bowel sounds  Extremities: No clubbing, cyanosis or edema   Skin: No new rashes or lesions  No draining wounds noted  Labs, Imaging, & Other studies:   All pertinent labs and imaging studies were personally reviewed    Results from last 7 days  Lab Units 18  0523 18  0459 18  1103   WBC Thousand/uL 8 74 7 60 11 46*   HEMOGLOBIN g/dL 10 1* 9 5* 11 2*   PLATELETS Thousands/uL 121* 121* 139*       Results from last 7 days  Lab Units 18  0523 18  0459 18  1103  18  0600 18  1413   SODIUM mmol/L 135* 140 138  < > 137 126*   POTASSIUM mmol/L 3 5 3 0* 3 2*  < > 3 9 4 3   CHLORIDE mmol/L 103 105 107  < > 103 94*   CO2 mmol/L 23 23 23  < > 25 22   BUN mg/dL 12 12 16  < > 22 24   CREATININE mg/dL 1 47* 1 42* 1 46*  < > 1 51* 2 00*   EGFR ml/min/1 73sq m 48 50 48  < > 46 33   CALCIUM mg/dL 8 1* 7 2* 8 7  < > 7 7* 9 8   AST U/L  --   --   --   --  42 34   ALT U/L  --   --   --   --  27 29   ALK PHOS U/L  --   --   --   --  122* 155*   < > = values in this interval not displayed  Results from last 7 days  Lab Units 18  1445 18  1222 18  1203 18  1414   BLOOD CULTURE   --   --  No Growth After 4 Days  No Growth After 4 Days  No Growth After 5 Days  GRAM STAIN RESULT  Rare Polys  Rare Mononuclear Cells  No bacteria seen  --   --   --    INFLUENZA B PCR   --  None Detected  --   --    RSV PCR   --  None Detected  --   --        MRI brain-1  Acute to early subacute punctate infarction in the left thalamus  2   Chronic left PCA territory infarction and redemonstrated mild to moderate cerebral white matter microangiopathic changes  3   Prior left frontal temporal craniotomy and left paraclinoid aneurysm clipping      Images reviewed by me in PACS

## 2018-12-17 NOTE — RESTORATIVE TECHNICIAN NOTE
Restorative Specialist Mobility Note       Activity: Ambulate in hay, Ambulate in room, Bathroom privileges, Chair, Stand at bedside, Dangle (Educated/encouraged pt to ambulate with assistance 3-4 x's/day   Pt callbell, phone/tray within reach )     Assistive Device: None       Bree BERGER, Restorative Technician, [employer] [facility]

## 2018-12-17 NOTE — PROGRESS NOTES
Pt refuses telemetry to be reapplied his is adamant about this, states he is sick of it, Dr Patricia Castillo made aware

## 2018-12-17 NOTE — CONSULTS
The patient's vancomycin therapy has been completed and discontinued by Dr Perfecto Desai  Thank you for this consult  Pharmacy will sign-off now

## 2018-12-17 NOTE — UTILIZATION REVIEW
Continued Stay Review for DOS  12/15  12/15  INTERNAL MED  mental status today appears to be largely improved (per wife) pt appears to be very close to his baseline  Hyponatremia resolved - last na 138  CARLOTTA -  CONT IVF to avoid possible nephrotoxicity (remains on acyclovir)  Bmp in AM   Restart Plavix tomorrow, stopped for lumbar puncture but this time no plan for any further interventions   No more episodes of hypoglycemia, continue with current insulin regimen with only small increase of Lantus from 4 to 5 units HS     12/15  NEUROLOGY  now hospital day 5, still with some HA    LP results unremarkable  MRI to be performed      12/15  ID   follow up final CSF culture  follow up CSF comprehensive PCR  blood cultures remain neg  Antibiotics:  Ceftriaxone 5  Ampicillin 5  Vancomycin 5  Acyclovir 5         12/16  MRI BRAIN -    1  Acute to early subacute punctate infarction in the left thalamus  2   Chronic left PCA territory infarction and redemonstrated mild to moderate cerebral white matter microangiopathic changes  3   Prior left frontal temporal craniotomy and left paraclinoid aneurysm clipping  12/16  NEUROLOGY   MRI today, unexpectedly shows a lacunar stroke in the L thalamus  Patient's transient encephalopathy possibly a consequence of disconnection syndrome  He does have vascular risk factors, including DM and tobacco use      Recommend stroke pathway: MRA H&N, TTE, continue on plavix, A1c, lipid panel  Clinically he is doing well    Will follow the stroke workup

## 2018-12-17 NOTE — ASSESSMENT & PLAN NOTE
Unclear etiology, possibly multifactorial related to acute kidney injury and hyponatremia together with possible infection cause such as meningitis  Patient was started on acyclovir, ampicillin, Rocephin, vancomycin - now stopped as per ID  Encephalopathy is currently resolved although, patient has moment of increased confusion according to family on a background of memory loss  Status post lumbar puncture 12/14, WBC elevated although, bloody tap with 5000 RBCs,, glucose is low, protein are elevated  PCR negative and culture negative so far with negative Gram stain  Encephalopathy resolved for now  Patient at baseline  As per Neurology could be 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

## 2018-12-17 NOTE — ASSESSMENT & PLAN NOTE
Seen on the MRI brain yesterday  Appreciate Neurology input  Stroke pathway  MRA head and neck ordered  Pending  Echo done this morning  Pending read  Appreciate Neurology input  Continue Plavix and statin  Patient has been walking around without any problems  He does not have any symptoms at this point from stroke  No neurological deficits  Do not feel that patient needs PT OT evaluation as he seeing that he is back to baseline and has been walking independently in the hallways

## 2018-12-18 VITALS
TEMPERATURE: 97.9 F | HEIGHT: 64 IN | WEIGHT: 128.53 LBS | HEART RATE: 86 BPM | DIASTOLIC BLOOD PRESSURE: 76 MMHG | RESPIRATION RATE: 18 BRPM | SYSTOLIC BLOOD PRESSURE: 126 MMHG | BODY MASS INDEX: 21.94 KG/M2 | OXYGEN SATURATION: 100 %

## 2018-12-18 PROBLEM — A41.9 SEPSIS (HCC): Status: RESOLVED | Noted: 2018-12-11 | Resolved: 2018-12-18

## 2018-12-18 PROBLEM — G93.40 ENCEPHALOPATHY ACUTE: Status: RESOLVED | Noted: 2018-12-11 | Resolved: 2018-12-18

## 2018-12-18 PROBLEM — E87.1 HYPONATREMIA: Status: RESOLVED | Noted: 2018-12-11 | Resolved: 2018-12-18

## 2018-12-18 PROBLEM — N18.30 ACUTE RENAL FAILURE SUPERIMPOSED ON STAGE 3 CHRONIC KIDNEY DISEASE (HCC): Status: RESOLVED | Noted: 2018-12-11 | Resolved: 2018-12-18

## 2018-12-18 PROBLEM — N17.9 ACUTE RENAL FAILURE SUPERIMPOSED ON STAGE 3 CHRONIC KIDNEY DISEASE (HCC): Status: RESOLVED | Noted: 2018-12-11 | Resolved: 2018-12-18

## 2018-12-18 LAB
ANION GAP SERPL CALCULATED.3IONS-SCNC: 8 MMOL/L (ref 4–13)
BUN SERPL-MCNC: 15 MG/DL (ref 5–25)
CALCIUM SERPL-MCNC: 9 MG/DL (ref 8.3–10.1)
CHLORIDE SERPL-SCNC: 105 MMOL/L (ref 100–108)
CO2 SERPL-SCNC: 23 MMOL/L (ref 21–32)
CREAT SERPL-MCNC: 1.6 MG/DL (ref 0.6–1.3)
GFR SERPL CREATININE-BSD FRML MDRD: 43 ML/MIN/1.73SQ M
GLUCOSE SERPL-MCNC: 104 MG/DL (ref 65–140)
GLUCOSE SERPL-MCNC: 110 MG/DL (ref 65–140)
GLUCOSE SERPL-MCNC: 154 MG/DL (ref 65–140)
POTASSIUM SERPL-SCNC: 3.9 MMOL/L (ref 3.5–5.3)
SODIUM SERPL-SCNC: 136 MMOL/L (ref 136–145)

## 2018-12-18 PROCEDURE — G0008 ADMIN INFLUENZA VIRUS VAC: HCPCS | Performed by: INTERNAL MEDICINE

## 2018-12-18 PROCEDURE — 99232 SBSQ HOSP IP/OBS MODERATE 35: CPT | Performed by: PSYCHIATRY & NEUROLOGY

## 2018-12-18 PROCEDURE — 82948 REAGENT STRIP/BLOOD GLUCOSE: CPT

## 2018-12-18 PROCEDURE — 80048 BASIC METABOLIC PNL TOTAL CA: CPT | Performed by: INTERNAL MEDICINE

## 2018-12-18 PROCEDURE — 90662 IIV NO PRSV INCREASED AG IM: CPT | Performed by: INTERNAL MEDICINE

## 2018-12-18 PROCEDURE — 99239 HOSP IP/OBS DSCHRG MGMT >30: CPT | Performed by: FAMILY MEDICINE

## 2018-12-18 PROCEDURE — 99232 SBSQ HOSP IP/OBS MODERATE 35: CPT | Performed by: INTERNAL MEDICINE

## 2018-12-18 RX ORDER — INSULIN GLARGINE 100 [IU]/ML
10 INJECTION, SOLUTION SUBCUTANEOUS
Qty: 10 ML | Refills: 0 | Status: SHIPPED | OUTPATIENT
Start: 2018-12-18 | End: 2018-12-21 | Stop reason: SDUPTHER

## 2018-12-18 RX ORDER — ATORVASTATIN CALCIUM 40 MG/1
40 TABLET, FILM COATED ORAL EVERY EVENING
Qty: 30 TABLET | Refills: 0 | Status: SHIPPED | OUTPATIENT
Start: 2018-12-18 | End: 2019-01-24 | Stop reason: SINTOL

## 2018-12-18 RX ORDER — CLOPIDOGREL BISULFATE 75 MG/1
75 TABLET ORAL DAILY
Qty: 30 TABLET | Refills: 0 | Status: SHIPPED | OUTPATIENT
Start: 2018-12-18 | End: 2019-01-23 | Stop reason: SDUPTHER

## 2018-12-18 RX ADMIN — INSULIN LISPRO 1 UNITS: 100 INJECTION, SOLUTION INTRAVENOUS; SUBCUTANEOUS at 11:40

## 2018-12-18 RX ADMIN — INSULIN LISPRO 1 UNITS: 100 INJECTION, SOLUTION INTRAVENOUS; SUBCUTANEOUS at 07:23

## 2018-12-18 RX ADMIN — ACETAMINOPHEN 650 MG: 325 TABLET, FILM COATED ORAL at 06:08

## 2018-12-18 RX ADMIN — INFLUENZA A VIRUS A/MICHIGAN/45/2015 X-275 (H1N1) ANTIGEN (FORMALDEHYDE INACTIVATED), INFLUENZA A VIRUS A/SINGAPORE/INFIMH-16-0019/2016 IVR-186 (H3N2) ANTIGEN (FORMALDEHYDE INACTIVATED), AND INFLUENZA B VIRUS B/MARYLAND/15/2016 BX-69A (A B/COLORADO/6/2017-LIKE VIRUS) ANTIGEN (FORMALDEHYDE INACTIVATED) 0.5 ML: 60; 60; 60 INJECTION, SUSPENSION INTRAMUSCULAR at 13:59

## 2018-12-18 RX ADMIN — CLOPIDOGREL BISULFATE 75 MG: 75 TABLET ORAL at 09:52

## 2018-12-18 RX ADMIN — TACROLIMUS 1 MG: 1 CAPSULE, GELATIN COATED ORAL at 09:52

## 2018-12-18 RX ADMIN — HEPARIN SODIUM 5000 UNITS: 5000 INJECTION INTRAVENOUS; SUBCUTANEOUS at 06:04

## 2018-12-18 NOTE — PROGRESS NOTES
Neurology - Progress Note  Vicente Paniagua 77 y o  male MRN: [id number]  Unit/Bed#: [facility] 703-01 Encounter: [id number]    Assessment:  Late acute/early subacute left thalamic cva  Small vessel thrombosis in setting of uncontrolled vascular risk factors, likely insufficient blood sugar control and possibly inadequately controlled lipids may be contributing  hba1c elevated at 8 8 during this admission  Acute toxic/metabolic encephalopathy resolved  Likely CARLOTTA/SIRS  LP results unremarkable  Pt also needs outpatient fasting lipid panel repeat  Last tested in December 2017 with  elevated  Plan:  Pt also needs outpatient fasting lipid panel repeat  Last tested in December 2017 with  elevated  Outpatient [facility] stroke neurology follow up  Continue plavix/statin  Will not consider plavix antiplatelet failure given suboptimally controlled vascular risk factors        ROS:  Per 12 point review no complaints    Vitals: Blood pressure 126/76, pulse 86, temperature 97 9 °F (36 6 °C), temperature source Oral, resp  rate 18, height 5' 4" (1 626 m), weight 58 3 kg (128 lb 8 5 oz), SpO2 100 %  ,Body mass index is 22 06 kg/m²  Physical Exam:    General appearance: alert, appears stated age and cooperative  Lungs: clear to auscultation bilaterally  Heart: regular rate and rhythm    Neurologic: Mental status: Alert, orientedX3, thought content appropriate  Some trouble coming up with the month then finally remembered  Full strength on exam ue/le bilat  Light touch intact throughout              Lab, Imaging and other studies: I have personally reviewed pertinent reports          Total 22 min spent evaluating patient and coming up with assessment/plan

## 2018-12-18 NOTE — DISCHARGE INSTRUCTIONS
Follow-up with the Neurology as an outpatient  Monitor blood sugar at home-before meals and at bedtime    Keep a log and take the log to the primary care physician at the time of appointment

## 2018-12-18 NOTE — RESTORATIVE TECHNICIAN NOTE
Restorative Specialist Mobility Note       Activity: Other (Comment), Up ad valdemar (Educated/encouraged pt to ambulate with in the halls 3-4 x's/day, pt is currently ambulating in the halls Independently nursing aware  )     Dayana BERGER, Restorative Technician, [employer]

## 2018-12-18 NOTE — SOCIAL WORK
Patient identified as high risk for readmission (HRR)  PCP follow-up appointment information provided and transportation arrangements verified with patient and/or caregiver  AVS reviewed for appointment documentation prior to discharge  OP Care Coordination Team notified to support transitions of care

## 2018-12-18 NOTE — PROGRESS NOTES
Progress Note - Infectious Disease   Pamela Cantu 77 y o  male MRN: [id number]  Unit/Bed#: [facility] 703-01 Encounter: [id number]      Impression/Plan:  1  Fever and encephalopathy-possibly all secondary to acute thalamic CVA  No CSF or radiographic evidence of encephalitis  Patient is much improved cognitively  Kelly Morgan, the symptomatology seems to be markedly improved within well less than 24 hr which would make these CNS infections less likely   He continues to improve  Consideration for the possibility of medication effect  Fortunately, the patient remains hemodynamically stable and nontoxic despite his systemic illness   The lumbar puncture was bloody but was otherwise normal    Comprehensive CSF PCR negative   Suspicion for acute CNS infection remains low   -observe off anymore antibiotics and antiviral  -close neurology follow-up     2  Systemic inflammatory response syndrome-POA   Fever and tachycardia   Fortunately the patient remains hemodynamically stable and nontoxic despite the systemic illness  No clear infectious etiology  Procalcitonin level is repeatedly normal   The blood cultures remain negative  He remains stable off all antibiotics  -observe off antibiotics, as above  -workup as above  -supportive care     3  Acute kidney injury-the setting of chronic kidney disease   Suspected pre renal issue   The renal function has improved with IV hydration and has now stabilized  Slight increase in the creatinine since yesterday  -monitor the GFR  -dose adjusted antibiotics as above  -for volume management        4  Liver transplantation-on immunosuppressive therapy       No active infectious disease issues  We will sign off  Please call if questions  Antibiotics:  None    Subjective:  Patient has no fever, chills, sweats; no nausea, vomiting, diarrhea; no cough, shortness of breath; no pain  No new symptoms    He seems in good spirits and is anxious to get home    Objective:  Vitals:  Temp:  [97 9 °F (36 6 °C)-98 8 °F (37 1 °C)] 97 9 °F (36 6 °C)  HR:  [76-86] 86  Resp:  [16-18] 18  BP: (126-161)/(76-98) 126/76  SpO2:  [98 %-100 %] 100 %  Temp (24hrs), Av 5 °F (36 9 °C), Min:97 9 °F (36 6 °C), Max:98 8 °F (37 1 °C)  Current: Temperature: 97 9 °F (36 6 °C)    Physical Exam:   General Appearance:  Alert, interactive, nontoxic, no acute distress  Throat: Oropharynx moist without lesions  Lungs:   Clear to auscultation bilaterally; no wheezes, rhonchi or rales; respirations unlabored   Heart:  RRR; no murmur, rub or gallop   Abdomen:   Soft, non-tender, non-distended, positive bowel sounds  Extremities: No clubbing, cyanosis or edema   Skin: No new rashes or lesions  No draining wounds noted  Labs, Imaging, & Other studies:   All pertinent labs and imaging studies were personally reviewed    Results from last 7 days  Lab Units 18  0523 18  0459 18  1103   WBC Thousand/uL 8 74 7 60 11 46*   HEMOGLOBIN g/dL 10 1* 9 5* 11 2*   PLATELETS Thousands/uL 121* 121* 139*       Results from last 7 days  Lab Units 18  0607 18  0523 18  0459  18  0600 18  1413   SODIUM mmol/L 136 135* 140  < > 137 126*   POTASSIUM mmol/L 3 9 3 5 3 0*  < > 3 9 4 3   CHLORIDE mmol/L 105 103 105  < > 103 94*   CO2 mmol/L 23 23 23  < > 25 22   BUN mg/dL 15 12 12  < > 22 24   CREATININE mg/dL 1 60* 1 47* 1 42*  < > 1 51* 2 00*   EGFR ml/min/1 73sq m 43 48 50  < > 46 33   CALCIUM mg/dL 9 0 8 1* 7 2*  < > 7 7* 9 8   AST U/L  --   --   --   --  42 34   ALT U/L  --   --   --   --  27 29   ALK PHOS U/L  --   --   --   --  122* 155*   < > = values in this interval not displayed  Results from last 7 days  Lab Units 18  1902 18  1445 18  1222 18  1203 18  1414   BLOOD CULTURE   --   --   --  No Growth After 5 Days  No Growth After 5 Days  No Growth After 5 Days     GRAM STAIN RESULT   --  Rare Polys  Rare Mononuclear Cells  No bacteria seen  --   --   -- INFLUENZA B PCR   --   --  None Detected  --   --    RSV PCR   --   --  None Detected  --   --    C DIFF TOXIN B  NEGATIVE for C difficle toxin by PCR    --   --   --   --

## 2018-12-19 ENCOUNTER — TRANSITIONAL CARE MANAGEMENT (OUTPATIENT)
Dept: FAMILY MEDICINE CLINIC | Facility: CLINIC | Age: 68
End: 2018-12-19

## 2018-12-19 NOTE — DISCHARGE SUMMARY
Discharge Summary - [facility] Internal Medicine    Patient Information: Madelaine Perez 77 y o  male MRN: [id number]  [facility] Encounter: [id number]    Discharging Physician / Practitioner: Lew Driver MD  PCP: Billy Loera DO  Admission Date: 12/11/2018  Discharge Date: 12/18/18  Disposition:     Home with VNA Services (Reminder: Complete face to face encounter)    Reason for Admission:  Encephalopathy    Discharge Diagnoses:     Principal Problem:    Left thalamic infarction [facility]  Active Problems:    Controlled diabetes mellitus with diabetic neuropathy, with long-term current use of insulin (HCC)    Liver transplant status ([facility] )    Personal history of transient ischemic attack (TIA), and cerebral infarction without residual deficits  Resolved Problems:    Encephalopathy acute    Sepsis ([facility] )    Acute renal failure superimposed on stage 3 chronic kidney disease ([facility] )    Hyponatremia      Consultations During Hospital Stay:  · Neurosurgery  · Infectious Disease  · Gastroenterology    Procedures Performed:   Lumbar puncture   picc line placement    Significant Findings / Test Results:   MRA carotid-  No evidence of hemodynamically significant stenosis or occlusion of the carotid arteries  Absence of flow related signal in the right vertebral artery 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  Dominant left vertebral artery  MRI and MRV of the head  Multifocal mild to moderate (50-60%) narrowing in the bilateral posterior cerebral arteries, suggesting atherosclerotic disease  No significant stenosis in the basilar artery  MRI brain  Acute to early subacute punctate infarction in the left thalamus  Chronic left PCA territory infarction and redemonstrated mild to moderate cerebral white matter microangiopathic changes  Prior left frontal temporal craniotomy and left paraclinoid aneurysm clipping    Chest x-ray-no acute cardiopulmonary disease  CT abdomen-no acute intra-abdominal abnormality  No free air or free fluid  CT head-no acute intracranial abnormality  Mild to moderate chronic small vessel  ischemic changes  Incidental Findings:   ·     Test Results Pending at Discharge (will require follow up):   ·      Outpatient Tests Requested:  · Fasting lipid profile    Complications:  none    Hospital Course:     Marques Ross is a 77 y o  male patient who originally presented to the hospital on 12/11/2018 due to acute onset of encephalopathy  Patient was not able to provide any history because of the encephalopathy and sedation  His initial CT scan was negative  Patient was febrile so Infectious Disease was consulted and lumbar puncture was performed which was unrevealing  Patient was on empiric antibiotics with vancomycin and ampicillin and ceftriaxone  Patient was also on acyclovir  Antibiotics were discontinued once the infectious workup came back negative  Patient was also found to be hyponatremic likely secondary to hypovolemia and was on IV fluids with normalization of sodium  Patient met the criteria for sepsis at the time of admission  Because of the persistent encephalopathy neurology evaluated the patient and he had an MRI done which showed presence of the thalamic stroke and this may be responsible for his encephalopathy  Patient's mental status improved  Patient with known history of liver transplant and patient was maintained on tacrolimus  and his level was appropriate  Dot by reviewing the outpatient records it appears that the patient was on aspirin statin and Plavix  By discussing with the patient and family it appears that patient was not taking statin or Plavix as an outpatient  Patient was only on aspirin which was changed to Plavix while in the hospital and aspirin was discontinued  Patient had since if he can uncontrolled vascular risk factors and Neurology recommended to continue with the Plavix    Patient remained hemodynamically stable and afebrile and his mentation improved and the back to his baseline  Patient was discharged in a stable condition on 12/18/2018  For details refer to the chart  Patient will be discharged home with visiting nurses    Condition at Discharge: good     Discharge Day Visit / Exam:     Subjective:  Patient seen and examined  Patient denies any specific complaints and he wants to go home  Vitals: Blood Pressure: 126/76 (12/18/18 0810)  Pulse: 86 (12/18/18 0810)  Temperature: 97 9 °F (36 6 °C) (12/18/18 0810)  Temp Source: Oral (12/18/18 0810)  Respirations: 18 (12/18/18 0810)  Height: 5' 4" (162 6 cm) (12/13/18 1518)  Weight - Scale: 58 3 kg (128 lb 8 5 oz) (12/18/18 0600)  SpO2: 100 % (12/18/18 0810)  Exam:   Physical Exam   Constitutional: He is oriented to person, place, and time  He appears well-developed and well-nourished  HENT:   Head: Normocephalic and atraumatic  Eyes: Pupils are equal, round, and reactive to light  EOM are normal    Neck: Normal range of motion  Neck supple  Cardiovascular: Normal rate and regular rhythm  No murmur heard  Pulmonary/Chest: Effort normal  No respiratory distress  He has no wheezes  Abdominal: Soft  Bowel sounds are normal  He exhibits no distension  Musculoskeletal: Normal range of motion  He exhibits no edema  Neurological: He is alert and oriented to person, place, and time  No cranial nerve deficit  Skin: Skin is warm and dry  No erythema  Discussion with Family:  Updated significant other in the room    Discharge instructions/Information to patient and family:   See after visit summary for information provided to patient and family  Provisions for Follow-Up Care:  See after visit summary for information related to follow-up care and any pertinent home health orders  Planned Readmission: none     Discharge Statement:  I spent 45  minutes discharging the patient  This time was spent on the day of discharge   I had direct contact with the patient on the day of discharge  Greater than 50% of the total time was spent examining patient, answering all patient questions, arranging and discussing plan of care with patient as well as directly providing post-discharge instructions  Additional time then spent on discharge activities  Discharge Medications:  See after visit summary for reconciled discharge medications provided to patient and family        ** Please Note: This note has been constructed using a voice recognition system **

## 2018-12-21 ENCOUNTER — OFFICE VISIT (OUTPATIENT)
Dept: FAMILY MEDICINE CLINIC | Facility: CLINIC | Age: 68
End: 2018-12-21
Payer: MEDICARE

## 2018-12-21 ENCOUNTER — PATIENT OUTREACH (OUTPATIENT)
Dept: CASE MANAGEMENT | Facility: OTHER | Age: 68
End: 2018-12-21

## 2018-12-21 VITALS
TEMPERATURE: 98.6 F | BODY MASS INDEX: 22.59 KG/M2 | WEIGHT: 131.6 LBS | HEART RATE: 87 BPM | RESPIRATION RATE: 16 BRPM | OXYGEN SATURATION: 100 % | DIASTOLIC BLOOD PRESSURE: 72 MMHG | SYSTOLIC BLOOD PRESSURE: 128 MMHG

## 2018-12-21 DIAGNOSIS — E13.40 NEUROPATHY DUE TO SECONDARY DIABETES MELLITUS (HCC): ICD-10-CM

## 2018-12-21 DIAGNOSIS — Z79.4 CONTROLLED TYPE 2 DIABETES MELLITUS WITH DIABETIC NEUROPATHY, WITH LONG-TERM CURRENT USE OF INSULIN (HCC): ICD-10-CM

## 2018-12-21 DIAGNOSIS — N18.30 CKD (CHRONIC KIDNEY DISEASE) STAGE 3, GFR 30-59 ML/MIN (HCC): Chronic | ICD-10-CM

## 2018-12-21 DIAGNOSIS — I63.9 CEREBROVASCULAR ACCIDENT (CVA), UNSPECIFIED MECHANISM (HCC): Primary | ICD-10-CM

## 2018-12-21 DIAGNOSIS — E11.40 CONTROLLED TYPE 2 DIABETES MELLITUS WITH DIABETIC NEUROPATHY, WITH LONG-TERM CURRENT USE OF INSULIN (HCC): ICD-10-CM

## 2018-12-21 DIAGNOSIS — I63.9 LEFT THALAMIC INFARCTION (HCC): ICD-10-CM

## 2018-12-21 PROBLEM — D63.1 ANEMIA DUE TO STAGE 3 CHRONIC KIDNEY DISEASE: Chronic | Status: ACTIVE | Noted: 2017-09-20

## 2018-12-21 PROBLEM — Z09 HOSPITAL DISCHARGE FOLLOW-UP: Status: ACTIVE | Noted: 2018-12-21

## 2018-12-21 PROBLEM — J34.89 NASAL LESION: Status: RESOLVED | Noted: 2018-09-11 | Resolved: 2018-12-21

## 2018-12-21 PROBLEM — D63.1 ANEMIA DUE TO STAGE 3 CHRONIC KIDNEY DISEASE (HCC): Chronic | Status: ACTIVE | Noted: 2017-09-20

## 2018-12-21 PROCEDURE — 99496 TRANSJ CARE MGMT HIGH F2F 7D: CPT | Performed by: FAMILY MEDICINE

## 2018-12-21 RX ORDER — INSULIN GLARGINE 100 [IU]/ML
20 INJECTION, SOLUTION SUBCUTANEOUS
Qty: 10 ML | Refills: 0
Start: 2018-12-21 | End: 2018-12-31 | Stop reason: SDUPTHER

## 2018-12-21 NOTE — PROGRESS NOTES
Assessment/Plan:    Problem List Items Addressed This Visit     CVA (cerebral vascular accident) ([facility] ) - Primary    Controlled diabetes mellitus with diabetic neuropathy, with long-term current use of insulin ([facility] )     Lab Results   Component Value Date    HGBA1C 8 8 (H) 12/17/2018       Recent Labs      12/18/18   1037   POCGLU  154*       Blood Sugar Average: Last 72 hrs:  Monitor   , improve diet           Relevant Medications    insulin glargine (LANTUS) 100 units/mL subcutaneous injection    Anemia due to stage 3 chronic kidney disease (HCC) (Chronic)     Was seeing Dr Lizzie Zaldivar  Will reschedule follow up         Left thalamic infarction [facility])     Seeing neurology  Started on lipitor and plavix for now         Relevant Orders    CBC    Comprehensive metabolic panel      Other Visit Diagnoses     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 ([facility] )        Relevant Medications    insulin glargine (LANTUS) 100 units/mL subcutaneous injection          There are no Patient Instructions on file for this visit  No Follow-up on file  Subjective:      Patient ID: Sam Morales is a 77 y o  male  Chief Complaint   Patient presents with    Transition of Care Management     PT is being seen due to being in the hospital dx with a mini stroke       Here for follow up of hospital stay  Tuesday 12/11- was vomiting, woke up confused, started with headache  Went to ER, was admittied  Was there for from 12/11-just this past week 12/17/18  Diagnosed with sepsis and stroke        TCM Call (since 11/20/2018)     Date and time call was made  12/19/2018  8:10 AM    Patient was hospitialized at  [facility]    Date of Admission  12/11/18    Date of discharge  12/17/18    Diagnosis  Encephalopathy    Disposition  Home    Were the patients medications reviewed and updated  Yes    Current Symptoms  None      TCM Call (since 11/20/2018)     Should patient be enrolled in anticoag monitoring? No    Scheduled for follow up? Yes    Did you obtain your prescribed medications  Yes    Do you need help managing your prescriptions or medications  No    Is transportation to your appointment needed  No    I have advised the patient to call PCP with any new or worsening symptoms  Chula Louis MA    Living Arrangements  Alone    Are you recieving any outpatient services  No    Have you fallen in the last 12 months  No    Counseling  Patient          The following portions of the patient's history were reviewed and updated as appropriate: allergies, current medications, past family history, past medical history, past social history, past surgical history and problem list     Review of Systems   Constitutional: Negative  HENT: Negative  Eyes: Negative  Respiratory: Negative  Cardiovascular: Negative  Gastrointestinal: Negative  Endocrine: Negative  Genitourinary: Negative  Musculoskeletal: Negative  Skin: Negative  Allergic/Immunologic: Negative  Neurological: Negative  Hematological: Negative  Psychiatric/Behavioral: Negative  Patient's shoes and socks removed  Right Foot/Ankle   Right Foot Inspection  Skin Exam: dry skin                          Toe Exam: ROM and strength within normal limits  Sensory   Vibration: diminished  Proprioception: diminished   Monofilament testing: diminished  Vascular  Capillary refills: < 3 seconds  The right DP pulse is 2+  The right PT pulse is 2+  Left Foot/Ankle  Left Foot Inspection  Skin Exam: dry skin                         Toe Exam: ROM and strength within normal limits                   Sensory   Vibration: diminished  Proprioception: diminished  Monofilament: diminished  Vascular  Capillary refills: < 3 seconds  The left DP pulse is 2+  The left PT pulse is 2+  Assign Risk Category:  Deformity present; Loss of protective sensation;  No weak pulses       Risk: 1    Current Outpatient Prescriptions   Medication Sig Dispense Refill    atorvastatin (LIPITOR) 40 mg tablet Take 1 tablet (40 mg total) by mouth every evening 30 tablet 0    GREER CONTOUR TEST test strip TEST THREE TIMES DAILY 300 each 5    Cetirizine HCl (ZYRTEC ALLERGY) 10 MG CAPS Take 1 tablet by mouth daily as needed      clopidogrel (PLAVIX) 75 mg tablet Take 1 tablet (75 mg total) by mouth daily 30 tablet 0    insulin glargine (LANTUS) 100 units/mL subcutaneous injection Inject 20 Units under the skin daily at bedtime 10 mL 0    INSULIN SYRINGE 1CC/29G 29G X 1/2" 1 ML MISC by Does not apply route      Lancets MISC by Does not apply route      LYRICA 50 MG capsule TAKE ONE CAPSULE BY MOUTH THREE TIMES DAILY 90 capsule 5    tacrolimus (PROGRAF) 1 mg capsule Take 1 capsule (1 mg total) by mouth every 12 (twelve) hours 180 capsule 3    temazepam (RESTORIL) 30 mg capsule TAKE 1 CAPSULE BY MOUTH EVERY DAY AT BEDTIME (Patient not taking: Reported on 9/11/2018) 30 capsule 3    traMADol (ULTRAM) 50 mg tablet TAKE 2 TABLETS(100 MG) BY MOUTH EVERY 8 HOURS AS NEEDED FOR MODERATE PAIN 180 tablet 0     No current facility-administered medications for this visit  Objective:    /72 (BP Location: Left arm, Patient Position: Sitting, Cuff Size: Standard)   Pulse 87   Temp 98 6 °F (37 °C) (Tympanic)   Resp 16   Wt 59 7 kg (131 lb 9 6 oz)   SpO2 100%   BMI 22 59 kg/m²        Physical Exam   Constitutional: He appears well-developed and well-nourished  HENT:   Head: Normocephalic and atraumatic  Eyes: Pupils are equal, round, and reactive to light  Neck: Normal range of motion  Neck supple  Cardiovascular: Normal rate, regular rhythm, normal heart sounds and intact distal pulses  Pulses are no weak pulses  Pulses:       Dorsalis pedis pulses are 2+ on the right side, and 2+ on the left side  Posterior tibial pulses are 2+ on the right side, and 2+ on the left side  Pulmonary/Chest: Effort normal and breath sounds normal    Abdominal: Soft   Bowel sounds are normal  Musculoskeletal: Normal range of motion  Feet:   Right Foot:   Skin Integrity: Positive for dry skin  Left Foot:   Skin Integrity: Positive for dry skin  Neurological: He is alert  Skin: Skin is warm and dry  Nursing note and vitals reviewed               Mis Jacob DO

## 2018-12-21 NOTE — PROGRESS NOTES
Spoke with patient regarding interest in outreach calls from Outpatient Care Management  Patient declines at this time

## 2018-12-24 ENCOUNTER — TELEPHONE (OUTPATIENT)
Dept: NEUROLOGY | Facility: CLINIC | Age: 68
End: 2018-12-24

## 2018-12-24 NOTE — TELEPHONE ENCOUNTER
----- Message from Dwayne Puga MD sent at 12/18/2018  7:05 PM EST -----  Please have patient follow up with stroke neurology in 4-6 weeks  Pt is already a patient of ours and had another stroke

## 2018-12-28 ENCOUNTER — TELEPHONE (OUTPATIENT)
Dept: FAMILY MEDICINE CLINIC | Facility: CLINIC | Age: 68
End: 2018-12-28

## 2018-12-28 NOTE — TELEPHONE ENCOUNTER
Mrs Grier Galeazzi called  He saw Dr Orestes Valentine as a TCM on 12/21/18   Grier Galeazzi said his sugar levels have been high such as  226 yesterday & 229 today, both in the morning  He does take the Lantus, but he has been taking 10 units at bedtime not the 20 units that was prescribed on 12/21/18, since she was unaware of the new dose  She will try the 20 units @ bedtime & see how it goes

## 2018-12-31 DIAGNOSIS — Z79.4 CONTROLLED TYPE 2 DIABETES MELLITUS WITH DIABETIC NEUROPATHY, WITH LONG-TERM CURRENT USE OF INSULIN (HCC): Primary | ICD-10-CM

## 2018-12-31 DIAGNOSIS — E13.40 NEUROPATHY DUE TO SECONDARY DIABETES MELLITUS (HCC): ICD-10-CM

## 2018-12-31 DIAGNOSIS — E11.40 CONTROLLED TYPE 2 DIABETES MELLITUS WITH DIABETIC NEUROPATHY, WITH LONG-TERM CURRENT USE OF INSULIN (HCC): Primary | ICD-10-CM

## 2018-12-31 RX ORDER — INSULIN GLARGINE 100 [IU]/ML
22 INJECTION, SOLUTION SUBCUTANEOUS
Qty: 10 ML | Refills: 0 | Status: SHIPPED | OUTPATIENT
Start: 2018-12-31 | End: 2019-07-01 | Stop reason: ALTCHOICE

## 2018-12-31 NOTE — TELEPHONE ENCOUNTER
Mrs Hua Dawson called  He saw Dr Mary Roy as a TCM on 12/21/18      Mrs Hua Dawson said his sugar levels have been high such as  226 yesterday & 229 today, both in the morning      He does take the Lantus, but he haD been taking 10 units at bedtime not the 20 units that was prescribed on 12/21/18, since she was unaware of the new dose      She  tried the 20 units @ bedtime for a few days & it did not help  She is asking if you would order Humalog to help him at bedtime with his sugar? His sugar was 229 again this morning  REFILL: also please refill LANTUS 100 Unit Injection, 10ml   He injects 20 Units @ bedtime

## 2018-12-31 NOTE — TELEPHONE ENCOUNTER
No, I'm not going to place him on Humalog at bedtime along with his Lantus (for a pt that I do not know) - this can put him at risk for Hypoglycemia over night  I will refill his Lantus  They can start with 22 - 23 Units in the evening, and if sugars still in the 200 range after a couple days, then increase the evening dose to 25 Units  Then f/u with Dr Giovany Salvador when she returns  Thanks      Aleah

## 2019-01-02 NOTE — PROGRESS NOTES
PATIENT NAME: Vicente Paniagua,  YOB: 1950  MEDICAL RECORD NUMBER: [id number]  Neurology Follow up Note     Following patient for: Encephalopathy    Overnight Events: None    Subjective: Patient underwent MRI brain today  He states he feels well  He was walking in the hallway  Denies new complaints  No headache, visual disturbance, confusion  Scheduled Meds:  Continuous Infusions:  No current facility-administered medications for this encounter  PRN Meds:  Objective  /76 (BP Location: Right arm)   Pulse 86   Temp 97 9 °F (36 6 °C) (Oral)   Resp 18   Ht 5' 4" (1 626 m)   Wt 58 3 kg (128 lb 8 5 oz)   SpO2 100%   BMI 22 06 kg/m²   GEN: Comfortable, NAD  HEENT: NC/AT  Anicteric  PPC  MMM   CVS: RRR  S1S2  No M/R/G    LUNGS: B/L entry, no wheezes, rhonchi or rales  EXT: B/L pulses, no edema  Mental Status: The patient was awake, alert, attentive, oriented to person, place, and time  Recent and remote memory intact to conversation with no evidence of language dysfunction  Satisfactory fund of knowledge  Normal attention span and concentration  Able to name, repeat, describe a complex scene  Cranial Nerves:   I: smell Not tested   II: visual fields Full to confrontation  Pupils equal, round, reactive to light with normal accomodation  Fundus: normal cup to disc ratio with no edema  III,IV,VI: extraocular muscles EOMI, no nystagmus   V: masseter and pterygoid strength  Sensation in the V1 through V3 distributions intact to pinprick and light touch bilaterally  VII: Face is symmetric with no weakness noted  VIII: Audition intact to finger rub bilaterally  IX/X: Uvula midline  Soft palate elevation symmetric  XI: Trapezius and SCM strength 5/5 B/L  XII: Tongue midline with no atrophy or fasciculations with appropriate movement  Motor Examination:   Bulk: Normal  No atrophy  Tone: Normal   Fasciculations: None        Shoulder Shoulder Elbow    Elbow Wrist Wrist    Intrinsics   Abduction Adduction Flexion    Ext  Flexion    Ext  Right 5  5  5    5  5    5    5    Left 5  5  5    5  5    5    5            Hip   Hip Knee    Knee      Plantar Dorsi   Flexion   Ext  Flexion    Ext  Flexion Flexion   Right   5   5 5    5      5  5  Left   5   5 5    5      5  5         Reflexes:                   Biceps Brachioradialis Triceps Patella Achilles Plantars   Right          2+            2+                  2+        2+       2+         Down   Left            2+             2+                 2+         2+       2+         Down       Coordination: Patient able to perform normal finger-to-nose and heel to shin appropriately  Normal rapid alternating movements  Sensory: Normal sensation to light touch, pin prick and vibratory sensation throughout  Gait:normal stance and posture, normal stride length and arm swing, normal turn around  Patient able to perform tandem gait without difficulty  Able toe walk and heel walk without difficulty  Romberg negative  No results found for this or any previous visit (from the past 24 hour(s))  MRI images personally reviewed: there is a focus of punctate diffusion restriction in the L thalamus with an ADC mapping correlate  There is periventricular white matter signal consistent with chronic microvascular disease  A/P  77 y o  man with DM, liver transplant, prior stroke, prior tobacco use and polysubstance/ETOH abuse, initially evaluated for encephalopathy, suspicious for meningitis as patient had fever, headache, and progression of confusion  LP results were benign  No longer on antibiotics  ID recommendations concur  MRI today, unexpectedly shows a lacunar stroke in the L thalamus  Patient's transient encephalopathy possibly a consequence of disconnection syndrome  He does have vascular risk factors, including DM and tobacco use        Recommend stroke pathway: MRA H&N, TTE, continue on plavix, A1c, lipid panel       Clinically he is doing well  Will follow the stroke workup

## 2019-01-02 NOTE — PROGRESS NOTES
PATIENT NAME: Deb Loya,  YOB: 1950  MEDICAL RECORD NUMBER: [id number]  Neurology Follow up Note     Following patient for: Concern of meningitis    Overnight Events: None    Subjective: Patient feels well today today  Family is at the bedside  They report he is at or near his baseline  12 point ROS negative for any changes  Scheduled Meds:  Continuous Infusions:  No current facility-administered medications for this encounter  PRN Meds:  Objective  /76 (BP Location: Right arm)   Pulse 86   Temp 97 9 °F (36 6 °C) (Oral)   Resp 18   Ht 5' 4" (1 626 m)   Wt 58 3 kg (128 lb 8 5 oz)   SpO2 100%   BMI 22 06 kg/m²   GEN: Comfortable, NAD  HEENT: NC/AT  Anicteric  PPC  MMM   CVS: RRR  S1S2  No M/R/G    LUNGS: B/L entry, no wheezes, rhonchi or rales  EXT: B/L pulses, no edema  Mental Status: The patient was awake, alert, attentive, oriented to person, place, and time  Recent and remote memory intact to conversation with no evidence of language dysfunction  Satisfactory fund of knowledge  Normal attention span and concentration  Able to name, repeat, describe a complex scene  Cranial Nerves:   I: smell Not tested   II: visual fields Full to confrontation  Pupils equal, round, reactive to light with normal accomodation  Fundus: normal cup to disc ratio with no edema  III,IV,VI: extraocular muscles EOMI, no nystagmus   V: masseter and pterygoid strength  Sensation in the V1 through V3 distributions intact to pinprick and light touch bilaterally  VII: Face is symmetric with no weakness noted  VIII: Audition intact to finger rub bilaterally  IX/X: Uvula midline  Soft palate elevation symmetric  XI: Trapezius and SCM strength 5/5 B/L  XII: Tongue midline with no atrophy or fasciculations with appropriate movement  Motor Examination:   Bulk: Normal  No atrophy  Tone: Normal   Fasciculations: None        Shoulder Shoulder Elbow    Elbow Wrist    Wrist Intrinsics   Abduction Adduction Flexion    Ext  Flexion    Ext  Right 5  5  5    5  5    5    5    Left 5  5  5    5  5    5    5            Hip   Hip Knee    Knee      Plantar Dorsi   Flexion   Ext  Flexion    Ext  Flexion Flexion   Right   5   5 5    5      5  5  Left   5   5 5    5      5  5         Reflexes:                   Biceps Brachioradialis Triceps Patella Achilles Plantars   Right          2+            2+                  2+        2+       2+         Down   Left            2+             2+                 2+         2+       2+         Down       Coordination: Patient able to perform normal finger-to-nose and heel to shin appropriately  Normal rapid alternating movements  Sensory: Normal sensation to light touch, pin prick and vibratory sensation throughout  Gait:normal stance and posture, normal stride length and arm swing, normal turn around  Patient able to perform tandem gait without difficulty  Able toe walk and heel walk without difficulty  Romberg negative  No results found for this or any previous visit (from the past 24 hour(s))  A/P  77year old man with prior stroke, liver transplant, now hospital day 5 after experiencing headache, fever, altered mental status, nausea/vomiting  No longer symptomatic  LP results unremarkable  ID following  MRI to be performed

## 2019-01-03 ENCOUNTER — TELEPHONE (OUTPATIENT)
Dept: FAMILY MEDICINE CLINIC | Facility: CLINIC | Age: 69
End: 2019-01-03

## 2019-01-03 NOTE — TELEPHONE ENCOUNTER
NURSE CALLED BACK APPARENTLY HE IS ON PLAVIX AND ATORVASTATIN AND IS ITCHY ALL OVER  NURSE TOLD HIM TO STOP THE ATORVASTATIN FOR NOW

## 2019-01-03 NOTE — TELEPHONE ENCOUNTER
He has been taking both for years  They don't work against each other   But if he would like, he can stop one of them

## 2019-01-03 NOTE — TELEPHONE ENCOUNTER
CELINE FROM [facility] NURSE IS WITH PT NOW AND SAID HE IS HAVING TROUBLE SLEEPING   PT IS TAKING AMBIEN AND TOMEZEPAM PER CELINE THEY TEND TO WORK AGAINST EACH OTHER AND CAN HE STOP ONE OF THEM? PLS ADVISE

## 2019-01-04 NOTE — TELEPHONE ENCOUNTER
We can switch the atorvastatin, but since he recently had a stroke, it would be better for him to be something

## 2019-01-04 NOTE — TELEPHONE ENCOUNTER
Spoke to BAKARI IM GESÄUSE at [facility] patient did stop the Atrovastatin for now due to itching all over

## 2019-01-05 DIAGNOSIS — G62.9 NEUROPATHY: ICD-10-CM

## 2019-01-07 ENCOUNTER — TELEPHONE (OUTPATIENT)
Dept: FAMILY MEDICINE CLINIC | Facility: CLINIC | Age: 69
End: 2019-01-07

## 2019-01-07 DIAGNOSIS — I63.9 CEREBROVASCULAR ACCIDENT (CVA), UNSPECIFIED MECHANISM (HCC): Primary | ICD-10-CM

## 2019-01-07 NOTE — TELEPHONE ENCOUNTER
JEANNETTE FROM [facility] CALLED AND SAID PT STOPPED TAKING THE ATORVASTATIN AND HE IS SLEEPING NOW  JEANNETTE WOULD LIKE AN ORDER FOR SPEECH THERAPY PLS FOR COGNITION   FAX [phone]

## 2019-01-09 ENCOUNTER — TELEPHONE (OUTPATIENT)
Dept: FAMILY MEDICINE CLINIC | Facility: CLINIC | Age: 69
End: 2019-01-09

## 2019-01-10 ENCOUNTER — TELEPHONE (OUTPATIENT)
Dept: FAMILY MEDICINE CLINIC | Facility: CLINIC | Age: 69
End: 2019-01-10

## 2019-01-10 DIAGNOSIS — E11.9 CONTROLLED TYPE 2 DIABETES MELLITUS WITHOUT COMPLICATION, WITH LONG-TERM CURRENT USE OF INSULIN (HCC): ICD-10-CM

## 2019-01-10 DIAGNOSIS — I63.9 CEREBROVASCULAR ACCIDENT (CVA), UNSPECIFIED MECHANISM (HCC): Primary | ICD-10-CM

## 2019-01-10 DIAGNOSIS — Z79.4 CONTROLLED TYPE 2 DIABETES MELLITUS WITHOUT COMPLICATION, WITH LONG-TERM CURRENT USE OF INSULIN (HCC): ICD-10-CM

## 2019-01-10 NOTE — TELEPHONE ENCOUNTER
NINFA NURSE CALLED AND WOULD LIKE A ORDER FOR DIABETIC EDUCATION PLS   SHE WOULD LIKE ME TO MAIL IT  PLS ADVISE

## 2019-01-10 NOTE — TELEPHONE ENCOUNTER
SPOKE WITH ALEISHA SHE SAID ITS AN ORDER FOR   ALEISHA STATED THEY HAD ONE BUT FAMILY WAITED SO LONG SO NOW THEY ONLY HAVE 7 DAYS ON THAT ORDER TO GET BACK IN THERE   ALEISHA IS REQUESTING A NEW ONE  I ASKED IF ITS FOR HOME HEALTH CARE SHE STATED SHE WORKS FOR THEM BUT ITS FOR

## 2019-01-23 ENCOUNTER — TRANSITIONAL CARE MANAGEMENT (OUTPATIENT)
Dept: FAMILY MEDICINE CLINIC | Facility: CLINIC | Age: 69
End: 2019-01-23

## 2019-01-23 DIAGNOSIS — I63.9 LEFT THALAMIC INFARCTION (HCC): ICD-10-CM

## 2019-01-23 DIAGNOSIS — E13.40 NEUROPATHY DUE TO SECONDARY DIABETES MELLITUS (HCC): ICD-10-CM

## 2019-01-23 RX ORDER — TRAMADOL HYDROCHLORIDE 50 MG/1
TABLET ORAL
Qty: 180 TABLET | Refills: 0 | Status: SHIPPED | OUTPATIENT
Start: 2019-01-23 | End: 2019-03-12 | Stop reason: SDUPTHER

## 2019-01-23 RX ORDER — CLOPIDOGREL BISULFATE 75 MG/1
75 TABLET ORAL DAILY
Qty: 30 TABLET | Refills: 5 | Status: SHIPPED | OUTPATIENT
Start: 2019-01-23 | End: 2019-08-05 | Stop reason: SDUPTHER

## 2019-01-23 NOTE — TELEPHONE ENCOUNTER
Patient has one left of each medications    Please refill to [facility] [phone]    traMADol (ULTRAM) 50 mg tablet, Dose, Route, Frequency: As Directed, Dispense Quantity:  180 tablet, Sig: TAKE 2 TABLETS(100 MG) BY MOUTH EVERY 8 HOURS AS NEEDED FOR MODERATE PAIN    PDMP checked, last filled by Dr Rayna Berg  12/04/2018  1  12/04/2018  TRAMADOL HCL 50 MG TABLET  180 0  30       clopidogrel (PLAVIX) 75 mg tablet, Dose: 75 mg, Route: Oral, Frequency: Daily, Dispense Quantity:  30 tablet, Sig: Take 1 tablet (75 mg total) by mouth daily

## 2019-01-24 ENCOUNTER — OFFICE VISIT (OUTPATIENT)
Dept: NEUROLOGY | Facility: CLINIC | Age: 69
End: 2019-01-24
Payer: MEDICARE

## 2019-01-24 ENCOUNTER — OFFICE VISIT (OUTPATIENT)
Dept: NEPHROLOGY | Facility: CLINIC | Age: 69
End: 2019-01-24
Payer: MEDICARE

## 2019-01-24 VITALS
SYSTOLIC BLOOD PRESSURE: 116 MMHG | DIASTOLIC BLOOD PRESSURE: 78 MMHG | WEIGHT: 130.6 LBS | HEART RATE: 80 BPM | HEIGHT: 63 IN | BODY MASS INDEX: 23.14 KG/M2

## 2019-01-24 VITALS
DIASTOLIC BLOOD PRESSURE: 70 MMHG | WEIGHT: 130.8 LBS | HEART RATE: 76 BPM | BODY MASS INDEX: 23.18 KG/M2 | HEIGHT: 63 IN | SYSTOLIC BLOOD PRESSURE: 122 MMHG

## 2019-01-24 DIAGNOSIS — Z86.73 HISTORY OF CVA (CEREBROVASCULAR ACCIDENT): Primary | ICD-10-CM

## 2019-01-24 DIAGNOSIS — E78.5 HYPERLIPIDEMIA, UNSPECIFIED HYPERLIPIDEMIA TYPE: ICD-10-CM

## 2019-01-24 DIAGNOSIS — N28.9 RENAL INSUFFICIENCY: Primary | ICD-10-CM

## 2019-01-24 PROCEDURE — 99214 OFFICE O/P EST MOD 30 MIN: CPT | Performed by: PHYSICIAN ASSISTANT

## 2019-01-24 PROCEDURE — 99213 OFFICE O/P EST LOW 20 MIN: CPT | Performed by: INTERNAL MEDICINE

## 2019-01-24 RX ORDER — ZOLPIDEM TARTRATE 10 MG/1
1 TABLET ORAL
Refills: 3 | COMMUNITY
Start: 2019-01-19 | End: 2019-02-18 | Stop reason: SDUPTHER

## 2019-01-24 NOTE — PROGRESS NOTES
Patient ID: Steve Diaz is a 77 y o  male  Assessment/Plan:    History of CVA (cerebrovascular accident)  Patient with history of left lentiform nucleus ischemic stroke 9/2017, started on ASA and statin at that time  Patient more recently presented to the hospital 12/2018 with AMS, vomiting, fever, HA  Concern for meningitis at the time  ID started him on empiric antibiotics, LP performed  Blood cultures negative  Once infectious workup came back negative, antibiotics were withdrawn  Neuro was following and MRI brain unexpectedly showed acute to early subacute punctate infarction in the left thalamus  A1C 8 8, Lipid panel not completed  ECHO with EF 65%, no regional wall motion abnormalities, no atrial dilation  MRA head demonstrated multifocal mild to moderate (50-60%) narrowing in the bilateral posterior cerebral arteries, suggesting atherosclerotic disease  No significant stenosis in the basilar artery  MRA carotids demonstrated no evidence of hemodynamically significant stenosis or occlusion of the carotid arteries  Absence of flow related signal in the right vertebral artery 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  Dominant left vertebral artery  Patient was being given Plavix in the hospital, as it was thought that was his AP regimen as outpatient  However, he had been taking ASA 81mg as an outpatient  He was just continued on Plavix  It was also discovered he was not taking a statin as he should have been  He was started on Lipitor 40mg, however patient stopped this med shortly after discharge due to itching and rash  Patient currently doing well, denies any residual symptoms or any new neurologic symptoms  CVA most likely small vessel in the setting of uncontrolled risk factors  Plan:  -continue Plavix 75mg daily  -will check lipid panel and then start statin (likely Crestor since he did not tolerate Lipitor)    Will call patient once lipid panel received  -patient to continue to work closely with PCP to manage cardiovascular risk factors including blood pressure, cholesterol and blood sugar   -follow up in 6 months with vascular neurology attending     -signs and symptoms of stroke were reviewed with patient and significant other today  Diagnoses and all orders for this visit:    History of CVA (cerebrovascular accident)    Hyperlipidemia, unspecified hyperlipidemia type  -     Lipid panel; Future    Other orders         Subjective:    HPI    Patient is a 77year old male with PMH of anterior choroidal artery aneurysm s/p clipping (no history of ICH), DM, Hep C s/p liver transplant, neuropathy, who presents today for a hospital follow up  He is known to our practice, having been seen following CVA in Sept 2017  Patient presented to the hospital on 12/11/18 with symptoms including altered mental status, headaches, vomiting  He was admitted for acute encephalopathy, concern for meningitis  He met criteria for SIRS  ID was following he was started on empiric antibiotics  LP was completed  CSF WBCs 6, but apparently suggestive of a bloody tap per ID  Culture and gram stain negative  Protein elevated at 115, glucose low at 48  Comprehensive PCR negative  Blood cultures were all negative and antibiotics withdrawn  Patient also had MRI brain completed which unexpectedly demonstrated acute to early subacute punctate infarction in the left thalamus  A1C 8 8, Lipid panel not completed  ECHO with EF 65%, no regional wall motion abnormalities, no atrial dilation  MRA head demonstrated multifocal mild to moderate (50-60%) narrowing in the bilateral posterior cerebral arteries, suggesting atherosclerotic disease  No significant stenosis in the basilar artery  MRA carotids demonstrated no evidence of hemodynamically significant stenosis or occlusion of the carotid arteries    Absence of flow related signal in the right vertebral artery 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  Dominant left vertebral artery  On admission, it was thought patient was taking Plavix outpatient, however he was actually taking ASA at home, but he was being given Plavix while inpatient  He was just continued on the Plavix at discharge  He also had apparently not been taking his statin  Lipitor 40mg was restarted  Today, patient reports he is doing well  He denies any new symptoms at this time  Patient reports he stopped the Lipitor because it was causing him itching and rash  I asked if it had done this in the past, because he was prescribed Lipitor after CVA in Sept 2017  Patient did not recall ever taking Lipitor before, although it was on his med list at several outpatient appointments  I then saw a change on his med list and looks like he was given Crestor 10mg  He does not recall that med either  He has not had a cholesterol panel checked since 12/2017 and LDL was 118 at that time  He is taking Plavix 75mg daily, no issues reported  The following portions of the patient's history were reviewed and updated as appropriate: current medications, past family history, past medical history, past social history, past surgical history and problem list          Objective:    Blood pressure 122/70, pulse 76, height 5' 3" (1 6 m), weight 59 3 kg (130 lb 12 8 oz)  Physical Exam   Constitutional: He appears well-developed and well-nourished  HENT:   Head: Normocephalic and atraumatic  Eyes: Pupils are equal, round, and reactive to light  EOM are normal    Cardiovascular: Intact distal pulses  Pulmonary/Chest: Effort normal    Neurological: He has normal strength and normal reflexes  Coordination normal    Skin: Skin is warm and dry  Psychiatric: He has a normal mood and affect  His speech is normal        Neurological Exam  Mental Status   Oriented to person, place, time and situation   Recent and remote memory are intact  Speech is normal  Language is fluent with no aphasia  Attention and concentration are normal     Cranial Nerves  CN II: Visual fields full to confrontation  CN III, IV, VI: Extraocular movements intact bilaterally  Pupils equal round and reactive to light bilaterally  CN V: Facial sensation is normal   CN VII: Full and symmetric facial movement  CN VIII: Hearing is normal   CN IX, X: Palate elevates symmetrically  Normal gag reflex  CN XI: Shoulder shrug strength is normal   CN XII: Tongue midline without atrophy or fasciculations  Motor   Normal muscle tone  Strength is 5/5 throughout all four extremities  Sensory  Sensation is intact to light touch, pinprick, vibration and proprioception in all four extremities  Reflexes  Deep tendon reflexes are 2+ and symmetric in all four extremities with downgoing toes bilaterally  Coordination  Finger-to-nose, rapid alternating movements and heel-to-shin normal bilaterally without dysmetria  Gait  Casual gait is normal including stance, stride, and arm swing  ROS:    Review of Systems   Constitutional: Positive for appetite change and unexpected weight change  Negative for fever  HENT: Negative  Negative for hearing loss, tinnitus, trouble swallowing and voice change  Eyes: Positive for visual disturbance  Negative for photophobia and pain  Dry eyes   Respiratory: Negative  Negative for shortness of breath  Cardiovascular: Negative  Negative for palpitations  Gastrointestinal: Negative  Negative for nausea and vomiting  Endocrine: Negative  Negative for cold intolerance and heat intolerance  Genitourinary: Negative  Negative for dysuria, frequency and urgency  Musculoskeletal: Positive for arthralgias  Negative for myalgias and neck pain  Skin: Positive for rash  Neurological: Positive for dizziness and headaches   Negative for tremors, seizures, syncope, facial asymmetry, speech difficulty, weakness, light-headedness and numbness  Snoring   Hematological: Bruises/bleeds easily  Psychiatric/Behavioral: Positive for confusion  Negative for hallucinations and sleep disturbance       I personally reviewed and updated the ROS as appropriate

## 2019-01-24 NOTE — PATIENT INSTRUCTIONS
Continue Plavix 75mg daily  Will check your cholesterol panel and then will likely start you on a medication called Crestor  It is important to maintain good control of your cardiovascular risk factors which including blood pressure, cholesterol and blood sugar  Your PCP will manage these things for you  If any new symptoms, let us know  If any weakness on one side of the body, facial droop, slurred speech, vision loss in one eye, vertigo that does not resolve, go to the ER  Follow up in 6 months or sooner if needed    Call for any new symptoms

## 2019-01-24 NOTE — PATIENT INSTRUCTIONS
You are here for follow-up to monitor your kidney function and your creatinine which is the blood test for the kidney function is 1 6 and is stable so there has been no worsening  Your blood pressure is excellent you look great physically  Continue current medications  You really look like your doing well continue with some low level exercise so you keep her strength up  With respect to kidney disease stage  This is really based on a calculated test and it truly does not reflect kidney function  As someone gets older or if there are small changes in the blood test for the creatinine that will make this number go down call to GFR and that it falls into a certain range of calculation that classify it in his certain stage  Most people do not progress all the way through all the stages it just is something that is a new terminology  If his blood test is always the same around this 1 6 Level then the function is not getting worse and if unfortunately it does start to get worse so I will of course see him more frequently so at this point he is doing great no changes

## 2019-01-24 NOTE — ASSESSMENT & PLAN NOTE
Patient with history of left lentiform nucleus ischemic stroke 9/2017, started on ASA and statin at that time  Patient more recently presented to the hospital 12/2018 with AMS, vomiting, fever, HA  Concern for meningitis at the time  ID started him on empiric antibiotics, LP performed  Blood cultures negative  Once infectious workup came back negative, antibiotics were withdrawn  Neuro was following and MRI brain unexpectedly showed acute to early subacute punctate infarction in the left thalamus  A1C 8 8, Lipid panel not completed  ECHO with EF 65%, no regional wall motion abnormalities, no atrial dilation  MRA head demonstrated multifocal mild to moderate (50-60%) narrowing in the bilateral posterior cerebral arteries, suggesting atherosclerotic disease  No significant stenosis in the basilar artery  MRA carotids demonstrated no evidence of hemodynamically significant stenosis or occlusion of the carotid arteries  Absence of flow related signal in the right vertebral artery 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  Dominant left vertebral artery  Patient was being given Plavix in the hospital, as it was thought that was his AP regimen as outpatient  However, he had been taking ASA 81mg as an outpatient  He was just continued on Plavix  It was also discovered he was not taking a statin as he should have been  He was started on Lipitor 40mg, however patient stopped this med shortly after discharge due to itching and rash  Patient currently doing well, denies any residual symptoms or any new neurologic symptoms  CVA most likely small vessel in the setting of uncontrolled risk factors  Plan:  -continue Plavix 75mg daily  -will check lipid panel and then start statin (likely Crestor since he did not tolerate Lipitor)    Will call patient once lipid panel received  -patient to continue to work closely with PCP to manage cardiovascular risk factors including blood pressure, cholesterol and blood sugar   -follow up in 6 months with vascular neurology attending     -signs and symptoms of stroke were reviewed with patient and significant other today

## 2019-01-24 NOTE — PROGRESS NOTES
NEPHROLOGY PROGRESS NOTE    Deb Loya 77 y o  male MRN: [id number]  Unit/Bed#:  Encounter: [id number]  Reason for Consult:  Chronic renal insufficiency    The patient is here for routine follow-up  Since I last saw him he actually was admitted to the hospital with mental status change and his wife describes that he was confused delirious had to be restrained had high blood sugars  He was found to have a very punctate thalamic stroke and other than that his workup was unrevealing  He is now at home a little bit depressed and anxious since the hospitalization but he says he feels well  ASSESSMENT/PLAN:  1  Renal  The patient has chronic renal insufficiency the due to nephrosclerosis or combination with chronic interstitial nephritis related tacrolimus therapy which is on for immunosuppression related to his liver transplant  Overall as health is good except for the recent hospitalization  Blood pressure is excellent creatinine stable 1 6 which is his baseline  We monitor periodically is protein excretion as it always has been very low I will check it before next visit and monitor renal function  For now continue all his current medications we reviewed the new medications in terms of his anti-platelet therapy  I told to call me if there is any problems in between next visit but his renal function remained stable  SUBJECTIVE:  Review of Systems   Constitution: Negative  HENT: Negative  Eyes: Negative  Cardiovascular: Negative  Negative for chest pain  Respiratory: Negative  Negative for cough and shortness of breath  Gastrointestinal: Negative  Negative for abdominal pain, diarrhea, nausea and vomiting  Genitourinary: Negative  Neurological:        No focal deficit and patient denies any residual deficit since his hospitalization         OBJECTIVE:  Current Weight: Weight - Scale: 59 2 kg (130 lb 9 6 oz)  [email or web address]     Blood pressure 116/78, pulse 80, height 5' 3" (1 6 m), weight 59 2 kg (130 lb 9 6 oz)  , Body mass index is 23 13 kg/m²  [unfilled]    Physical Exam: /78 (BP Location: Right arm, Patient Position: Sitting, Cuff Size: Standard)   Pulse 80   Ht 5' 3" (1 6 m)   Wt 59 2 kg (130 lb 9 6 oz)   BMI 23 13 kg/m²   Physical Exam   Constitutional: He is oriented to person, place, and time  HENT:   Mouth/Throat: No oropharyngeal exudate  Eyes: No scleral icterus  Neck: Normal range of motion  Neck supple  No JVD present  Cardiovascular: Normal rate and regular rhythm  Exam reveals no friction rub  No edema  Pulmonary/Chest: Effort normal and breath sounds normal  No respiratory distress  He has no wheezes  He has no rales  Abdominal: Soft  Bowel sounds are normal  He exhibits no distension  There is no tenderness  There is no rebound  Neurological: He is alert and oriented to person, place, and time         Medications:    Current Outpatient Prescriptions:     GREER CONTOUR TEST test strip, TEST THREE TIMES DAILY, Disp: 300 each, Rfl: 5    Cetirizine HCl (ZYRTEC ALLERGY) 10 MG CAPS, Take 1 tablet by mouth daily as needed, Disp: , Rfl:     clopidogrel (PLAVIX) 75 mg tablet, Take 1 tablet (75 mg total) by mouth daily, Disp: 30 tablet, Rfl: 5    insulin glargine (LANTUS) 100 units/mL subcutaneous injection, Inject 22 Units under the skin daily at bedtime, Disp: 10 mL, Rfl: 0    INSULIN SYRINGE 1CC/29G 29G X 1/2" 1 ML MISC, by Does not apply route, Disp: , Rfl:     Lancets MISC, by Does not apply route, Disp: , Rfl:     LYRICA 50 MG capsule, TAKE 1 CAPSULE BY MOUTH THREE TIMES DAILY, Disp: 90 capsule, Rfl: 3    tacrolimus (PROGRAF) 1 mg capsule, Take 1 capsule (1 mg total) by mouth every 12 (twelve) hours, Disp: 180 capsule, Rfl: 3    temazepam (RESTORIL) 30 mg capsule, TAKE 1 CAPSULE BY MOUTH EVERY DAY AT BEDTIME, Disp: 30 capsule, Rfl: 3    traMADol (ULTRAM) 50 mg tablet, Take 2 Tablets by mouth every 8 hours as needed for moderate pain, Disp: 180 tablet, Rfl: 0    zolpidem (AMBIEN) 10 mg tablet, Take 1 tablet by mouth daily at bedtime, Disp: , Rfl: 3    Laboratory Results:  Lab Results   Component Value Date    WBC 8 74 12/17/2018    HGB 10 1 (L) 12/17/2018    HCT 32 0 (L) 12/17/2018    MCV 76 (L) 12/17/2018     (L) 12/17/2018     Lab Results   Component Value Date    GLUCOSE 291 (H) 12/17/2015    CALCIUM 9 0 12/18/2018     12/17/2015    K 3 9 12/18/2018    CO2 23 12/18/2018     12/18/2018    BUN 15 12/18/2018    CREATININE 1 60 (H) 12/18/2018     Lab Results   Component Value Date    CALCIUM 9 0 12/18/2018    PHOS 2 6 12/17/2018     No results found for: LABPROT

## 2019-02-18 DIAGNOSIS — G47.00 INSOMNIA, UNSPECIFIED TYPE: ICD-10-CM

## 2019-02-18 DIAGNOSIS — F51.02 ADJUSTMENT INSOMNIA: Primary | ICD-10-CM

## 2019-02-18 RX ORDER — TEMAZEPAM 30 MG/1
30 CAPSULE ORAL
Qty: 30 CAPSULE | Refills: 3 | Status: SHIPPED | OUTPATIENT
Start: 2019-02-18 | End: 2019-06-17 | Stop reason: SDUPTHER

## 2019-02-18 RX ORDER — ZOLPIDEM TARTRATE 10 MG/1
10 TABLET ORAL
Qty: 30 TABLET | Refills: 3 | Status: SHIPPED | OUTPATIENT
Start: 2019-02-18 | End: 2019-05-24 | Stop reason: SDUPTHER

## 2019-02-18 NOTE — TELEPHONE ENCOUNTER
Patient is out of medication and did not get a medication  Refill for his temazepam     Please refill    temazepam (RESTORIL) 30 mg capsule, Dose, Route, Frequency: As Directed, Dispense Quantity: 30 capsule, Refills: 3, Sig: TAKE 1 CAPSULE BY MOUTH EVERY DAY AT BEDTIME    PDMP checked, last filled by Dr Vivian Arana  01/19/2019  1  10/19/2018  TEMAZEPAM 30 MG CAPSULE  30 0  30          [facility] [phone]

## 2019-02-19 RX ORDER — TEMAZEPAM 30 MG/1
CAPSULE ORAL
Qty: 30 CAPSULE | Refills: 0 | OUTPATIENT
Start: 2019-02-19

## 2019-03-04 ENCOUNTER — TELEPHONE (OUTPATIENT)
Dept: NEUROLOGY | Facility: CLINIC | Age: 69
End: 2019-03-04

## 2019-03-04 DIAGNOSIS — Z86.73 HISTORY OF CVA (CEREBROVASCULAR ACCIDENT): Primary | ICD-10-CM

## 2019-03-04 DIAGNOSIS — E78.5 HYPERLIPIDEMIA, UNSPECIFIED HYPERLIPIDEMIA TYPE: ICD-10-CM

## 2019-03-04 NOTE — TELEPHONE ENCOUNTER
Called and spoke to [employer]  She will have pt go have labs drawn fasting tomorrow   lipitor added to pt allergy list

## 2019-03-05 ENCOUNTER — APPOINTMENT (OUTPATIENT)
Dept: LAB | Facility: HOSPITAL | Age: 69
End: 2019-03-05
Payer: MEDICARE

## 2019-03-05 DIAGNOSIS — E78.5 HYPERLIPIDEMIA, UNSPECIFIED HYPERLIPIDEMIA TYPE: ICD-10-CM

## 2019-03-05 LAB
CHOLEST SERPL-MCNC: 177 MG/DL (ref 50–200)
HDLC SERPL-MCNC: 37 MG/DL (ref 40–60)
LDLC SERPL CALC-MCNC: 88 MG/DL (ref 0–100)
NONHDLC SERPL-MCNC: 140 MG/DL
TRIGL SERPL-MCNC: 260 MG/DL

## 2019-03-05 PROCEDURE — 80061 LIPID PANEL: CPT

## 2019-03-05 PROCEDURE — 36415 COLL VENOUS BLD VENIPUNCTURE: CPT

## 2019-03-05 RX ORDER — ROSUVASTATIN CALCIUM 20 MG/1
20 TABLET, COATED ORAL DAILY
Qty: 30 TABLET | Refills: 3 | Status: SHIPPED | OUTPATIENT
Start: 2019-03-05 | End: 2019-11-11 | Stop reason: SDUPTHER

## 2019-03-05 NOTE — TELEPHONE ENCOUNTER
Just got patient's lipid panel back  Total cholesterol 177, LDL 88  Triglycerides 260 and HDL 37  As discussed at visit, he should be on a statin  Since he could not take Lipitor, would recommend Crestor 20mg, a different high intensity statin  I will send to his pharmacy

## 2019-03-11 NOTE — TELEPHONE ENCOUNTER
I called and spoke to FROYLAN [facility] and advised her of the below   She is agreeable and will  crestor from the pharmacy

## 2019-03-12 DIAGNOSIS — E13.40 NEUROPATHY DUE TO SECONDARY DIABETES MELLITUS (HCC): ICD-10-CM

## 2019-03-12 RX ORDER — TRAMADOL HYDROCHLORIDE 50 MG/1
TABLET ORAL
Qty: 180 TABLET | Refills: 0 | Status: SHIPPED | OUTPATIENT
Start: 2019-03-12 | End: 2019-04-30 | Stop reason: SDUPTHER

## 2019-04-05 ENCOUNTER — TELEPHONE (OUTPATIENT)
Dept: FAMILY MEDICINE CLINIC | Facility: CLINIC | Age: 69
End: 2019-04-05

## 2019-04-07 ENCOUNTER — OFFICE VISIT (OUTPATIENT)
Dept: URGENT CARE | Age: 69
End: 2019-04-07
Payer: MEDICARE

## 2019-04-07 VITALS
SYSTOLIC BLOOD PRESSURE: 118 MMHG | HEART RATE: 68 BPM | TEMPERATURE: 97.3 F | RESPIRATION RATE: 16 BRPM | DIASTOLIC BLOOD PRESSURE: 72 MMHG | HEIGHT: 63 IN | WEIGHT: 132 LBS | BODY MASS INDEX: 23.39 KG/M2 | OXYGEN SATURATION: 97 %

## 2019-04-07 DIAGNOSIS — S91.331A PUNCTURE WOUND OF RIGHT FOOT, INITIAL ENCOUNTER: Primary | ICD-10-CM

## 2019-04-07 PROCEDURE — G0463 HOSPITAL OUTPT CLINIC VISIT: HCPCS | Performed by: FAMILY MEDICINE

## 2019-04-07 PROCEDURE — 99212 OFFICE O/P EST SF 10 MIN: CPT | Performed by: FAMILY MEDICINE

## 2019-04-07 RX ORDER — MULTIVITAMIN
TABLET ORAL
COMMUNITY
End: 2022-04-27 | Stop reason: ALTCHOICE

## 2019-04-12 ENCOUNTER — TELEPHONE (OUTPATIENT)
Dept: FAMILY MEDICINE CLINIC | Facility: CLINIC | Age: 69
End: 2019-04-12

## 2019-04-18 ENCOUNTER — TELEPHONE (OUTPATIENT)
Dept: FAMILY MEDICINE CLINIC | Facility: CLINIC | Age: 69
End: 2019-04-18

## 2019-04-30 DIAGNOSIS — E13.40 NEUROPATHY DUE TO SECONDARY DIABETES MELLITUS (HCC): ICD-10-CM

## 2019-05-02 RX ORDER — TRAMADOL HYDROCHLORIDE 50 MG/1
TABLET ORAL
Qty: 180 TABLET | Refills: 0 | Status: SHIPPED | OUTPATIENT
Start: 2019-05-02 | End: 2019-06-17 | Stop reason: SDUPTHER

## 2019-05-24 DIAGNOSIS — F51.02 ADJUSTMENT INSOMNIA: ICD-10-CM

## 2019-05-24 RX ORDER — ZOLPIDEM TARTRATE 10 MG/1
TABLET ORAL
Qty: 30 TABLET | Refills: 0 | Status: SHIPPED | OUTPATIENT
Start: 2019-05-24 | End: 2019-06-17 | Stop reason: SDUPTHER

## 2019-05-28 ENCOUNTER — TELEPHONE (OUTPATIENT)
Dept: FAMILY MEDICINE CLINIC | Facility: CLINIC | Age: 69
End: 2019-05-28

## 2019-06-04 ENCOUNTER — APPOINTMENT (OUTPATIENT)
Dept: LAB | Facility: HOSPITAL | Age: 69
End: 2019-06-04
Payer: MEDICARE

## 2019-06-04 DIAGNOSIS — N18.30 CKD (CHRONIC KIDNEY DISEASE) STAGE 3, GFR 30-59 ML/MIN (HCC): Chronic | ICD-10-CM

## 2019-06-04 DIAGNOSIS — I63.9 LEFT THALAMIC INFARCTION (HCC): ICD-10-CM

## 2019-06-04 DIAGNOSIS — N28.9 RENAL INSUFFICIENCY: ICD-10-CM

## 2019-06-04 DIAGNOSIS — E11.40 CONTROLLED TYPE 2 DIABETES WITH NEUROPATHY (HCC): ICD-10-CM

## 2019-06-04 LAB
ALBUMIN SERPL BCP-MCNC: 3.7 G/DL (ref 3.5–5)
ALP SERPL-CCNC: 88 U/L (ref 46–116)
ALT SERPL W P-5'-P-CCNC: 18 U/L (ref 12–78)
ANION GAP SERPL CALCULATED.3IONS-SCNC: 3 MMOL/L (ref 4–13)
AST SERPL W P-5'-P-CCNC: 18 U/L (ref 5–45)
BASOPHILS # BLD AUTO: 0.05 THOUSANDS/ΜL (ref 0–0.1)
BASOPHILS NFR BLD AUTO: 1 % (ref 0–1)
BILIRUB SERPL-MCNC: 0.22 MG/DL (ref 0.2–1)
BUN SERPL-MCNC: 23 MG/DL (ref 5–25)
CALCIUM SERPL-MCNC: 8.7 MG/DL (ref 8.3–10.1)
CHLORIDE SERPL-SCNC: 106 MMOL/L (ref 100–108)
CO2 SERPL-SCNC: 29 MMOL/L (ref 21–32)
CREAT SERPL-MCNC: 1.59 MG/DL (ref 0.6–1.3)
EOSINOPHIL # BLD AUTO: 0.57 THOUSAND/ΜL (ref 0–0.61)
EOSINOPHIL NFR BLD AUTO: 8 % (ref 0–6)
ERYTHROCYTE [DISTWIDTH] IN BLOOD BY AUTOMATED COUNT: 14.6 % (ref 11.6–15.1)
EST. AVERAGE GLUCOSE BLD GHB EST-MCNC: 174 MG/DL
GFR SERPL CREATININE-BSD FRML MDRD: 43 ML/MIN/1.73SQ M
GLUCOSE P FAST SERPL-MCNC: 100 MG/DL (ref 65–99)
HBA1C MFR BLD: 7.7 % (ref 4.2–6.3)
HCT VFR BLD AUTO: 41.4 % (ref 36.5–49.3)
HGB BLD-MCNC: 12.5 G/DL (ref 12–17)
IMM GRANULOCYTES # BLD AUTO: 0.01 THOUSAND/UL (ref 0–0.2)
IMM GRANULOCYTES NFR BLD AUTO: 0 % (ref 0–2)
LYMPHOCYTES # BLD AUTO: 3.01 THOUSANDS/ΜL (ref 0.6–4.47)
LYMPHOCYTES NFR BLD AUTO: 41 % (ref 14–44)
MCH RBC QN AUTO: 24.5 PG (ref 26.8–34.3)
MCHC RBC AUTO-ENTMCNC: 30.2 G/DL (ref 31.4–37.4)
MCV RBC AUTO: 81 FL (ref 82–98)
MONOCYTES # BLD AUTO: 0.52 THOUSAND/ΜL (ref 0.17–1.22)
MONOCYTES NFR BLD AUTO: 7 % (ref 4–12)
NEUTROPHILS # BLD AUTO: 3.24 THOUSANDS/ΜL (ref 1.85–7.62)
NEUTS SEG NFR BLD AUTO: 43 % (ref 43–75)
NRBC BLD AUTO-RTO: 0 /100 WBCS
PLATELET # BLD AUTO: 136 THOUSANDS/UL (ref 149–390)
PMV BLD AUTO: 13.2 FL (ref 8.9–12.7)
POTASSIUM SERPL-SCNC: 4.3 MMOL/L (ref 3.5–5.3)
PROT SERPL-MCNC: 8.4 G/DL (ref 6.4–8.2)
RBC # BLD AUTO: 5.11 MILLION/UL (ref 3.88–5.62)
SODIUM SERPL-SCNC: 138 MMOL/L (ref 136–145)
WBC # BLD AUTO: 7.4 THOUSAND/UL (ref 4.31–10.16)

## 2019-06-04 PROCEDURE — 85025 COMPLETE CBC W/AUTO DIFF WBC: CPT

## 2019-06-04 PROCEDURE — 83036 HEMOGLOBIN GLYCOSYLATED A1C: CPT

## 2019-06-04 PROCEDURE — 36415 COLL VENOUS BLD VENIPUNCTURE: CPT

## 2019-06-04 PROCEDURE — 80053 COMPREHEN METABOLIC PANEL: CPT

## 2019-06-13 ENCOUNTER — OFFICE VISIT (OUTPATIENT)
Dept: FAMILY MEDICINE CLINIC | Facility: CLINIC | Age: 69
End: 2019-06-13
Payer: MEDICARE

## 2019-06-13 VITALS
TEMPERATURE: 97.6 F | HEIGHT: 63 IN | WEIGHT: 132.4 LBS | DIASTOLIC BLOOD PRESSURE: 70 MMHG | RESPIRATION RATE: 14 BRPM | OXYGEN SATURATION: 98 % | HEART RATE: 78 BPM | SYSTOLIC BLOOD PRESSURE: 130 MMHG | BODY MASS INDEX: 23.46 KG/M2

## 2019-06-13 DIAGNOSIS — E78.2 MIXED HYPERLIPIDEMIA: ICD-10-CM

## 2019-06-13 DIAGNOSIS — Z00.00 MEDICARE ANNUAL WELLNESS VISIT, SUBSEQUENT: ICD-10-CM

## 2019-06-13 DIAGNOSIS — Z86.73 HISTORY OF CVA (CEREBROVASCULAR ACCIDENT): ICD-10-CM

## 2019-06-13 DIAGNOSIS — R39.12 WEAK URINE STREAM: ICD-10-CM

## 2019-06-13 DIAGNOSIS — N28.9 RENAL INSUFFICIENCY: ICD-10-CM

## 2019-06-13 DIAGNOSIS — Z12.5 SCREENING FOR PROSTATE CANCER: ICD-10-CM

## 2019-06-13 DIAGNOSIS — Z79.4 TYPE 2 DIABETES MELLITUS WITH DIABETIC PERIPHERAL ANGIOPATHY WITHOUT GANGRENE, WITH LONG-TERM CURRENT USE OF INSULIN (HCC): Primary | ICD-10-CM

## 2019-06-13 DIAGNOSIS — D69.6 THROMBOCYTOPENIA (HCC): Chronic | ICD-10-CM

## 2019-06-13 DIAGNOSIS — E11.51 TYPE 2 DIABETES MELLITUS WITH DIABETIC PERIPHERAL ANGIOPATHY WITHOUT GANGRENE, WITH LONG-TERM CURRENT USE OF INSULIN (HCC): Primary | ICD-10-CM

## 2019-06-13 PROBLEM — Z09 HOSPITAL DISCHARGE FOLLOW-UP: Status: RESOLVED | Noted: 2018-12-21 | Resolved: 2019-06-13

## 2019-06-13 PROCEDURE — 99214 OFFICE O/P EST MOD 30 MIN: CPT | Performed by: FAMILY MEDICINE

## 2019-06-13 PROCEDURE — G0439 PPPS, SUBSEQ VISIT: HCPCS | Performed by: FAMILY MEDICINE

## 2019-06-17 DIAGNOSIS — G47.00 INSOMNIA, UNSPECIFIED TYPE: ICD-10-CM

## 2019-06-17 DIAGNOSIS — F51.02 ADJUSTMENT INSOMNIA: ICD-10-CM

## 2019-06-17 DIAGNOSIS — E13.40 NEUROPATHY DUE TO SECONDARY DIABETES MELLITUS (HCC): ICD-10-CM

## 2019-06-17 RX ORDER — TEMAZEPAM 30 MG/1
30 CAPSULE ORAL
Qty: 30 CAPSULE | Refills: 3 | Status: SHIPPED | OUTPATIENT
Start: 2019-06-17 | End: 2019-10-11 | Stop reason: SDUPTHER

## 2019-06-17 RX ORDER — ZOLPIDEM TARTRATE 10 MG/1
10 TABLET ORAL
Qty: 30 TABLET | Refills: 0 | Status: SHIPPED | OUTPATIENT
Start: 2019-06-17 | End: 2019-07-16 | Stop reason: SDUPTHER

## 2019-06-17 RX ORDER — TRAMADOL HYDROCHLORIDE 50 MG/1
100 TABLET ORAL EVERY 8 HOURS PRN
Qty: 180 TABLET | Refills: 0 | Status: SHIPPED | OUTPATIENT
Start: 2019-06-17 | End: 2019-08-05 | Stop reason: SDUPTHER

## 2019-07-01 ENCOUNTER — TELEPHONE (OUTPATIENT)
Dept: FAMILY MEDICINE CLINIC | Facility: CLINIC | Age: 69
End: 2019-07-01

## 2019-07-01 DIAGNOSIS — Z79.4 CONTROLLED TYPE 2 DIABETES MELLITUS WITH DIABETIC NEUROPATHY, WITH LONG-TERM CURRENT USE OF INSULIN (HCC): Primary | ICD-10-CM

## 2019-07-01 DIAGNOSIS — E11.40 CONTROLLED TYPE 2 DIABETES MELLITUS WITH DIABETIC NEUROPATHY, WITH LONG-TERM CURRENT USE OF INSULIN (HCC): Primary | ICD-10-CM

## 2019-07-01 NOTE — TELEPHONE ENCOUNTER
Patient's medication lantus is not covered by insurance but the following medications are:    basaglar  tresiba  levemir    Please fill one of these medications in place of lantus

## 2019-07-08 ENCOUNTER — TELEPHONE (OUTPATIENT)
Dept: NEUROLOGY | Facility: CLINIC | Age: 69
End: 2019-07-08

## 2019-07-08 ENCOUNTER — TELEPHONE (OUTPATIENT)
Dept: FAMILY MEDICINE CLINIC | Facility: CLINIC | Age: 69
End: 2019-07-08

## 2019-07-08 DIAGNOSIS — E11.40 CONTROLLED TYPE 2 DIABETES MELLITUS WITH DIABETIC NEUROPATHY, WITH LONG-TERM CURRENT USE OF INSULIN (HCC): ICD-10-CM

## 2019-07-08 DIAGNOSIS — Z79.4 CONTROLLED TYPE 2 DIABETES MELLITUS WITH DIABETIC NEUROPATHY, WITH LONG-TERM CURRENT USE OF INSULIN (HCC): ICD-10-CM

## 2019-07-08 DIAGNOSIS — G62.9 NEUROPATHY: ICD-10-CM

## 2019-07-08 RX ORDER — PEN NEEDLE, DIABETIC 30 GX3/16"
NEEDLE, DISPOSABLE MISCELLANEOUS
Qty: 100 EACH | Refills: 5 | Status: SHIPPED | OUTPATIENT
Start: 2019-07-08 | End: 2021-08-31 | Stop reason: SDUPTHER

## 2019-07-08 RX ORDER — PREGABALIN 50 MG/1
50 CAPSULE ORAL 3 TIMES DAILY
Qty: 90 CAPSULE | Refills: 3 | Status: SHIPPED | OUTPATIENT
Start: 2019-07-08 | End: 2019-12-04 | Stop reason: SDUPTHER

## 2019-07-08 NOTE — TELEPHONE ENCOUNTER
Called patient to r/s appointment  with  Dr Julia Crawford on 8/1/19, appointment rescheduled for 11/21/19 at 12:30 in our [place] office  Updated appointment card mailed

## 2019-07-15 LAB
LEFT EYE DIABETIC RETINOPATHY: NORMAL
RIGHT EYE DIABETIC RETINOPATHY: NORMAL
SEVERITY (EYE EXAM): NORMAL

## 2019-07-16 DIAGNOSIS — F51.02 ADJUSTMENT INSOMNIA: ICD-10-CM

## 2019-07-17 RX ORDER — ZOLPIDEM TARTRATE 10 MG/1
TABLET ORAL
Qty: 30 TABLET | Refills: 0 | Status: SHIPPED | OUTPATIENT
Start: 2019-07-17 | End: 2019-08-16 | Stop reason: SDUPTHER

## 2019-07-19 ENCOUNTER — APPOINTMENT (OUTPATIENT)
Dept: LAB | Facility: HOSPITAL | Age: 69
End: 2019-07-19
Attending: INTERNAL MEDICINE
Payer: MEDICARE

## 2019-07-19 ENCOUNTER — TELEPHONE (OUTPATIENT)
Dept: NEPHROLOGY | Facility: CLINIC | Age: 69
End: 2019-07-19

## 2019-07-19 DIAGNOSIS — Z79.4 TYPE 2 DIABETES MELLITUS WITH DIABETIC PERIPHERAL ANGIOPATHY WITHOUT GANGRENE, WITH LONG-TERM CURRENT USE OF INSULIN (HCC): ICD-10-CM

## 2019-07-19 DIAGNOSIS — Z12.5 SCREENING FOR PROSTATE CANCER: ICD-10-CM

## 2019-07-19 DIAGNOSIS — E11.51 TYPE 2 DIABETES MELLITUS WITH DIABETIC PERIPHERAL ANGIOPATHY WITHOUT GANGRENE, WITH LONG-TERM CURRENT USE OF INSULIN (HCC): ICD-10-CM

## 2019-07-19 DIAGNOSIS — R39.12 WEAK URINE STREAM: ICD-10-CM

## 2019-07-19 LAB
BACTERIA UR QL AUTO: ABNORMAL /HPF
BILIRUB UR QL STRIP: NEGATIVE
CLARITY UR: CLEAR
COLOR UR: YELLOW
CREAT UR-MCNC: 95.2 MG/DL
CREAT UR-MCNC: 95.2 MG/DL
GLUCOSE UR STRIP-MCNC: ABNORMAL MG/DL
HGB UR QL STRIP.AUTO: NEGATIVE
KETONES UR STRIP-MCNC: NEGATIVE MG/DL
LEUKOCYTE ESTERASE UR QL STRIP: ABNORMAL
MICROALBUMIN UR-MCNC: 40.1 MG/L (ref 0–20)
MICROALBUMIN/CREAT 24H UR: 42 MG/G CREATININE (ref 0–30)
NITRITE UR QL STRIP: NEGATIVE
NON-SQ EPI CELLS URNS QL MICRO: ABNORMAL /HPF
PH UR STRIP.AUTO: 5.5 [PH]
PROT UR STRIP-MCNC: NEGATIVE MG/DL
PROT UR-MCNC: 23 MG/DL
PROT/CREAT UR: 0.24 MG/G{CREAT} (ref 0–0.1)
PSA SERPL-MCNC: 0.2 NG/ML (ref 0–4)
RBC #/AREA URNS AUTO: ABNORMAL /HPF
SP GR UR STRIP.AUTO: 1.02 (ref 1–1.03)
UROBILINOGEN UR QL STRIP.AUTO: 0.2 E.U./DL
WBC #/AREA URNS AUTO: ABNORMAL /HPF

## 2019-07-19 PROCEDURE — 87086 URINE CULTURE/COLONY COUNT: CPT

## 2019-07-19 PROCEDURE — 36415 COLL VENOUS BLD VENIPUNCTURE: CPT

## 2019-07-19 PROCEDURE — 82043 UR ALBUMIN QUANTITATIVE: CPT

## 2019-07-19 PROCEDURE — G0103 PSA SCREENING: HCPCS

## 2019-07-19 PROCEDURE — 82570 ASSAY OF URINE CREATININE: CPT

## 2019-07-19 PROCEDURE — 87186 SC STD MICRODIL/AGAR DIL: CPT

## 2019-07-19 PROCEDURE — 81001 URINALYSIS AUTO W/SCOPE: CPT

## 2019-07-19 PROCEDURE — 84156 ASSAY OF PROTEIN URINE: CPT

## 2019-07-19 NOTE — TELEPHONE ENCOUNTER
Pt is having pain in kidneys for 6 days now  Does not have any burning, urgency with urinating  No swelling, SOB when he is moving around  Patient has no history of kidney stones, he has had a liver transplant in the patient  He states the pain in his kidneys is bilateral  He did have some labs done this morning, UA, UPC, Microalbumin  Please advise

## 2019-07-19 NOTE — TELEPHONE ENCOUNTER
Pain in his kidneys short of there being a kidney infection or kidney stone which it does not sound like he has cause he has no fever is likely back pain  I did see he had a urine culture sent we can see if that grows anything but for now if the pain continues it could cause family physician

## 2019-07-19 NOTE — TELEPHONE ENCOUNTER
Pt's girlfriend called back and Dr Justin Delgado above message was relayed  No further questions/concerns

## 2019-07-23 DIAGNOSIS — N39.0 URINARY TRACT INFECTION WITHOUT HEMATURIA, SITE UNSPECIFIED: Primary | ICD-10-CM

## 2019-07-23 LAB — BACTERIA UR CULT: ABNORMAL

## 2019-07-23 RX ORDER — NITROFURANTOIN 25; 75 MG/1; MG/1
100 CAPSULE ORAL 2 TIMES DAILY
Qty: 14 CAPSULE | Refills: 0 | Status: SHIPPED | OUTPATIENT
Start: 2019-07-23 | End: 2019-07-30

## 2019-07-25 ENCOUNTER — OFFICE VISIT (OUTPATIENT)
Dept: UROLOGY | Facility: CLINIC | Age: 69
End: 2019-07-25
Payer: MEDICARE

## 2019-07-25 VITALS — BODY MASS INDEX: 22.32 KG/M2 | HEIGHT: 63 IN | WEIGHT: 126 LBS

## 2019-07-25 DIAGNOSIS — N40.0 BENIGN PROSTATIC HYPERPLASIA WITHOUT LOWER URINARY TRACT SYMPTOMS: ICD-10-CM

## 2019-07-25 DIAGNOSIS — R39.12 WEAK URINE STREAM: ICD-10-CM

## 2019-07-25 DIAGNOSIS — R39.12 WEAK URINE STREAM: Primary | ICD-10-CM

## 2019-07-25 LAB — POST-VOID RESIDUAL VOLUME, ML POC: 62 ML

## 2019-07-25 PROCEDURE — 51798 US URINE CAPACITY MEASURE: CPT | Performed by: UROLOGY

## 2019-07-25 PROCEDURE — 99203 OFFICE O/P NEW LOW 30 MIN: CPT | Performed by: UROLOGY

## 2019-07-25 PROCEDURE — 1124F ACP DISCUSS-NO DSCNMKR DOCD: CPT | Performed by: UROLOGY

## 2019-07-25 RX ORDER — TAMSULOSIN HYDROCHLORIDE 0.4 MG/1
CAPSULE ORAL
Qty: 90 CAPSULE | Refills: 6 | Status: SHIPPED | OUTPATIENT
Start: 2019-07-25 | End: 2020-02-17 | Stop reason: SDUPTHER

## 2019-07-25 RX ORDER — TAMSULOSIN HYDROCHLORIDE 0.4 MG/1
0.4 CAPSULE ORAL
Qty: 30 CAPSULE | Refills: 6 | Status: SHIPPED | OUTPATIENT
Start: 2019-07-25 | End: 2019-07-25 | Stop reason: SDUPTHER

## 2019-07-25 NOTE — PROGRESS NOTES
Progress Note - Urology  Brunilda Duff 68 y o  male MRN: [id number]  Encounter: [id number]      Chief Complaint:   Chief Complaint   Patient presents with   Lova Heymann stream       HPI:  64-year-old patient with multiple medical issues including diabetes, status post liver transplant recently seen for suprapubic pressure and weak urinary stream   He was found to have an infection with Staphylococcus and started on Macrobid 2 days prior to this visit  He reports no fever chills  He has chronic back and leg pain  He is on Lyrica and Ultram   He denies nocturia  He primarily is complaining of a weak stream   His PSA is 0 2   He has no past history of bladder or prostate infection  He has been complaining of a weak stream for more than a year    He does have neuropathy in the lower extremity      MEDS:    Current Outpatient Medications:     GREER CONTOUR TEST test strip, TEST THREE TIMES DAILY, Disp: 300 each, Rfl: 5    Cetirizine HCl (ZYRTEC ALLERGY) 10 MG CAPS, Take 1 tablet by mouth daily as needed, Disp: , Rfl:     clopidogrel (PLAVIX) 75 mg tablet, Take 1 tablet (75 mg total) by mouth daily, Disp: 30 tablet, Rfl: 5    insulin detemir (LEVEMIR FLEXTOUCH) 100 Units/mL injection pen, Inject 22 Units under the skin daily at bedtime, Disp: 5 pen, Rfl: 5    Insulin Pen Needle (PEN NEEDLES) 32G X 4 MM MISC, by Does not apply route daily at bedtime, Disp: 100 each, Rfl: 5    INSULIN SYRINGE 1CC/29G 29G X 1/2" 1 ML MISC, by Does not apply route, Disp: , Rfl:     Lancets MISC, by Does not apply route, Disp: , Rfl:     nitrofurantoin (MACROBID) 100 mg capsule, Take 1 capsule (100 mg total) by mouth 2 (two) times a day for 7 days, Disp: 14 capsule, Rfl: 0    rosuvastatin (CRESTOR) 20 MG tablet, Take 1 tablet (20 mg total) by mouth daily, Disp: 30 tablet, Rfl: 3    tacrolimus (PROGRAF) 1 mg capsule, Take 1 capsule (1 mg total) by mouth every 12 (twelve) hours, Disp: 180 capsule, Rfl: 3    temazepam (RESTORIL) 30 mg capsule, Take 1 capsule (30 mg total) by mouth daily at bedtime, Disp: 30 capsule, Rfl: 3    traMADol (ULTRAM) 50 mg tablet, Take 2 tablets (100 mg total) by mouth every 8 (eight) hours as needed for moderate pain, Disp: 180 tablet, Rfl: 0    zolpidem (AMBIEN) 10 mg tablet, TAKE 1 TABLET(10 MG) BY MOUTH DAILY AT BEDTIME, Disp: 30 tablet, Rfl: 0    Multiple Vitamin tablet, Take by mouth, Disp: , Rfl:     pregabalin (LYRICA) 50 mg capsule, Take 1 capsule (50 mg total) by mouth 3 (three) times a day, Disp: 90 capsule, Rfl: 3    tamsulosin (FLOMAX) 0 4 mg, Take 1 capsule (0 4 mg total) by mouth daily with dinner for 30 days, Disp: 30 capsule, Rfl: 6      PMH:  Past Medical History:   Diagnosis Date    [facility]     Cerebral artery aneurysm     Change in mental state     last assessed 5/18/15; resolved 10/27/15    Diabetes mellitus ([facility] )     Drug dependence ([facility] )     Fatigue     last assessed 1/26/15; resolved 5/24/16    Hepatitis [address] [place] [address] discharge follow-up 12/21/2018    Kidney disease     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[facility] )     [facility] recipient [facility])     Renal disorder     Subdural hygroma     2/27/14; resolved 7/28/15    Thrombocytopenia ([facility] ) 9/20/2017         PSH  Past Surgical History:   Procedure Laterality Date    BRAIN SURGERY  02/12/2014    left frontotemporal 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    CATARACT EXTRACTION      CHOLECYSTECTOMY      FL LUMBAR PUNCTURE  12/14/2018    IR PICC LINE  12/14/2018    LIVER TRANSPLANTATION      ROTATOR CUFF REPAIR      SHOULDER SURGERY           ROS:  Review of Systems      Vitals:  Height 5' 3" (1 6 m), weight 57 2 kg (126 lb)  Physical Exam:     General Appearance   68-year-old male frail in no immediate distress  Cardiac    Heart rate is regular  Pulmonary   Breathing is not labored  Abdomen   Soft, no masses, scars are present from the prior surgery    Back    No CVA tenderness  Genitalia   Normal male both testes are unremarkable no hernia defects were noted  I do not appreciate any bladder distention  Rectal     Sphincter tone is normal   The prostate is symmetrical grossly benign roughly 20-22 g without nodule  Extremities    No edema  Neurologic    Grossly physiologic  The gait is somewhat unsteady    Lab, Imaging and other studies:  Recent Results (from the past 672 hour(s))   Protein / creatinine ratio, urine    Collection Time: 07/19/19  9:37 AM   Result Value Ref Range    Creatinine, Ur 95 2 mg/dL    Protein Urine Random 23 mg/dL    Prot/Creat Ratio, Ur 0 24 (H) 0 00 - 0 10   PSA, Total Screen    Collection Time: 07/19/19  9:37 AM   Result Value Ref Range    PSA 0 2 0 0 - 4 0 ng/mL   Microalbumin / creatinine urine ratio    Collection Time: 07/19/19  9:37 AM   Result Value Ref Range    Creatinine, Ur 95 2 mg/dL    Microalbum  ,U,Random 40 1 (H) 0 0 - 20 0 mg/L    Microalb Creat Ratio 42 (H) 0 - 30 mg/g creatinine   UA w Reflex to Microscopic w Reflex to Culture -Lab Collect    Collection Time: 07/19/19  9:37 AM   Result Value Ref Range    Color, UA Yellow     Clarity, UA Clear     Specific [place], UA 1 019 1 003 - 1 030    pH, UA 5 5 4 5, 5 0, 5 5, 6 0, 6 5, 7 0, 7 5, 8 0    Leukocytes, UA Small (A) Negative    Nitrite, UA Negative Negative    Protein, UA Negative Negative mg/dl    Glucose,  (1/2%) (A) Negative mg/dl    Ketones, UA Negative Negative mg/dl    Urobilinogen, UA 0 2 0 2, 1 0 E U /dl E U /dl    Bilirubin, UA Negative Negative    Blood, UA Negative Negative   Urine Microscopic    Collection Time: 07/19/19  9:37 AM   Result Value Ref Range    RBC, UA None Seen None Seen, 0-5 /hpf    WBC, UA 30-50 (A) None Seen, 0-5, 5-55, 5-65 /hpf    Epithelial Cells None Seen None Seen, Occasional /hpf    Bacteria, UA Occasional None Seen, Occasional /hpf   Urine culture    Collection Time: 07/19/19  9:37 AM   Result Value Ref Range    Urine Culture 80,000-89,000 cfu/ml Staphylococcus epidermidis (A)        Susceptibility    Staphylococcus epidermidis - FELISHA     Ampicillin ($$) 4 00 Resistant ug/ml     Cefazolin ($) <=4 00 Resistant ug/ml     Gentamicin ($$) <=1 Susceptible ug/ml     Nitrofurantoin <=32 Susceptible ug/ml     Oxacillin >2 00 Resistant ug/ml     Tetracycline <=2 Susceptible ug/ml     Trimethoprim + Sulfamethoxazole ($$$) >2/38 Resistant ug/ml     Vancomycin ($) 2 00 Susceptible ug/ml   POCT Measure PVR    Collection Time: 07/25/19  8:58 AM   Result Value Ref Range    POST-VOID RESIDUAL VOLUME, ML POC 62 mL           IMPRESSION:   1  UTI on therapy   2  Weak urinary stream    PLAN:   I will empirically give him a trial of alpha blocker tamsulosin 0 4 mg and recheck in roughly 10-12 weeks    He is currently on Macrobid and will complete that therapy I will recheck his urinalysis on his next visit

## 2019-08-05 DIAGNOSIS — E13.40 NEUROPATHY DUE TO SECONDARY DIABETES MELLITUS (HCC): ICD-10-CM

## 2019-08-05 DIAGNOSIS — I63.9 LEFT THALAMIC INFARCTION (HCC): ICD-10-CM

## 2019-08-05 RX ORDER — CLOPIDOGREL BISULFATE 75 MG/1
TABLET ORAL
Qty: 90 TABLET | Refills: 3 | Status: SHIPPED | OUTPATIENT
Start: 2019-08-05 | End: 2020-07-27

## 2019-08-05 RX ORDER — CLOPIDOGREL BISULFATE 75 MG/1
75 TABLET ORAL DAILY
Qty: 30 TABLET | Refills: 5 | Status: SHIPPED | OUTPATIENT
Start: 2019-08-05 | End: 2019-08-05 | Stop reason: SDUPTHER

## 2019-08-05 RX ORDER — TRAMADOL HYDROCHLORIDE 50 MG/1
100 TABLET ORAL EVERY 8 HOURS PRN
Qty: 180 TABLET | Refills: 0 | Status: SHIPPED | OUTPATIENT
Start: 2019-08-05 | End: 2019-09-20 | Stop reason: SDUPTHER

## 2019-08-05 NOTE — TELEPHONE ENCOUNTER
PLS REFILL clopidogrel (PLAVIX) 75 mg tablet Take 1 tablet (75 mg total) by mouth daily     ALSO traMADol (ULTRAM) 50 mg tablet   Take 2 tablets (100 mg total) by mouth every 8 (eight) hours as needed for moderate pain            TO ANDRESEENS ON FILE

## 2019-08-12 DIAGNOSIS — Z79.4 CONTROLLED TYPE 2 DIABETES MELLITUS WITHOUT COMPLICATION, WITH LONG-TERM CURRENT USE OF INSULIN (HCC): ICD-10-CM

## 2019-08-12 DIAGNOSIS — E11.9 CONTROLLED TYPE 2 DIABETES MELLITUS WITHOUT COMPLICATION, WITH LONG-TERM CURRENT USE OF INSULIN (HCC): ICD-10-CM

## 2019-08-13 DIAGNOSIS — E11.9 CONTROLLED TYPE 2 DIABETES MELLITUS WITHOUT COMPLICATION, WITH LONG-TERM CURRENT USE OF INSULIN (HCC): ICD-10-CM

## 2019-08-13 DIAGNOSIS — Z79.4 CONTROLLED TYPE 2 DIABETES MELLITUS WITHOUT COMPLICATION, WITH LONG-TERM CURRENT USE OF INSULIN (HCC): ICD-10-CM

## 2019-08-13 NOTE — TELEPHONE ENCOUNTER
Patient's wife called due to patient being out of strips for 6 days  glucose blood (GREER CONTOUR TEST) test strip  Dose: 1 each Route: Other Frequency: 3 times daily   Dispense Quantity: 300 each Refills: 5 Fills remaining: --           Si each by Other route 3 (three) times a day Test               Pharmacy requires prior auth and will send form to office to be completed  Please call patient's wife, Sonia Ya when ready

## 2019-08-15 ENCOUNTER — TELEPHONE (OUTPATIENT)
Dept: FAMILY MEDICINE CLINIC | Facility: CLINIC | Age: 69
End: 2019-08-15

## 2019-08-15 NOTE — TELEPHONE ENCOUNTER
Patient's wife called to tell us that she called the pharmacy today and was told that pharmacy did not receive the pre-auth form that we faxed on 8/14  I called the pharmacy and was a given a fax number; [phone], to send form to and I faxed it

## 2019-08-15 NOTE — TELEPHONE ENCOUNTER
Called pharmacy to verify that they received the fax and they did  Strips are ready for   LM for patient

## 2019-08-16 DIAGNOSIS — F51.02 ADJUSTMENT INSOMNIA: ICD-10-CM

## 2019-08-16 NOTE — TELEPHONE ENCOUNTER
Patient has one left    Please refill     zolpidem (AMBIEN) 10 mg tablet, Dose, Route, Frequency: As Directed, Dispense Quantity: 30 tablet, Sig: TAKE 1 TABLET(10 MG) BY MOUTH DAILY AT BEDTIME    Katherine [phone]

## 2019-08-18 RX ORDER — ZOLPIDEM TARTRATE 10 MG/1
10 TABLET ORAL
Qty: 30 TABLET | Refills: 3 | Status: SHIPPED | OUTPATIENT
Start: 2019-08-18 | End: 2019-09-13 | Stop reason: SDUPTHER

## 2019-09-05 DIAGNOSIS — Z94.4 LIVER TRANSPLANTED (HCC): ICD-10-CM

## 2019-09-05 RX ORDER — TACROLIMUS 1 MG/1
1 CAPSULE ORAL EVERY 12 HOURS SCHEDULED
Qty: 180 CAPSULE | Refills: 3 | Status: SHIPPED | OUTPATIENT
Start: 2019-09-05 | End: 2020-09-08

## 2019-09-05 NOTE — TELEPHONE ENCOUNTER
Medication: tacrolimus (PROGRAF) 1 mg capsule  Dosage: 1 mg capsule  How Often: Take 1 capsule (1 mg total) by mouth every 12 (twelve) hours  Quantity:  180   Last Office Visit: 6/13/19  Next Office Visit: 10/15/19  Last refilled: 7/19/18  How many pills left: 0   Pharmacy:   [facility] #[id number], [address] @[address] [address]  Phone: [phone] Fax: [phone]

## 2019-09-13 DIAGNOSIS — F51.02 ADJUSTMENT INSOMNIA: ICD-10-CM

## 2019-09-13 RX ORDER — ZOLPIDEM TARTRATE 10 MG/1
10 TABLET ORAL
Qty: 30 TABLET | Refills: 0 | Status: SHIPPED | OUTPATIENT
Start: 2019-09-13 | End: 2019-10-11 | Stop reason: SDUPTHER

## 2019-09-13 NOTE — TELEPHONE ENCOUNTER
Medication:zolpidem (AMBIEN) 10 mg tablet       Dosage:10 MG  How Often:Sig: Take 1 tablet (10 mg total) by mouth daily at bedtime  Quantity:  30  Last Office Visit: 6/13/19  Next Office Visit:10/15/19  Last refilled:8/18/19  How many pills left:0  Pharmacy:   [facility] #[id number], [address] [place] [address]  Phone: [phone] Fax: [phone]

## 2019-09-19 ENCOUNTER — OFFICE VISIT (OUTPATIENT)
Dept: FAMILY MEDICINE CLINIC | Facility: CLINIC | Age: 69
End: 2019-09-19
Payer: MEDICARE

## 2019-09-19 VITALS
DIASTOLIC BLOOD PRESSURE: 70 MMHG | TEMPERATURE: 97.3 F | HEIGHT: 63 IN | RESPIRATION RATE: 18 BRPM | HEART RATE: 71 BPM | BODY MASS INDEX: 22.11 KG/M2 | SYSTOLIC BLOOD PRESSURE: 120 MMHG | OXYGEN SATURATION: 99 % | WEIGHT: 124.8 LBS

## 2019-09-19 DIAGNOSIS — R51.9 HEADACHE, CHRONIC DAILY: ICD-10-CM

## 2019-09-19 DIAGNOSIS — Z94.4 LIVER TRANSPLANT STATUS (HCC): ICD-10-CM

## 2019-09-19 DIAGNOSIS — Z23 NEED FOR VACCINATION: ICD-10-CM

## 2019-09-19 DIAGNOSIS — R21 RASH: Primary | ICD-10-CM

## 2019-09-19 DIAGNOSIS — Z79.4 TYPE 2 DIABETES MELLITUS WITH DIABETIC PERIPHERAL ANGIOPATHY WITHOUT GANGRENE, WITH LONG-TERM CURRENT USE OF INSULIN (HCC): ICD-10-CM

## 2019-09-19 DIAGNOSIS — E11.51 TYPE 2 DIABETES MELLITUS WITH DIABETIC PERIPHERAL ANGIOPATHY WITHOUT GANGRENE, WITH LONG-TERM CURRENT USE OF INSULIN (HCC): ICD-10-CM

## 2019-09-19 DIAGNOSIS — Z86.19 HISTORY OF HEPATITIS C: ICD-10-CM

## 2019-09-19 PROCEDURE — 1124F ACP DISCUSS-NO DSCNMKR DOCD: CPT | Performed by: FAMILY MEDICINE

## 2019-09-19 PROCEDURE — G0008 ADMIN INFLUENZA VIRUS VAC: HCPCS

## 2019-09-19 PROCEDURE — 90662 IIV NO PRSV INCREASED AG IM: CPT

## 2019-09-19 PROCEDURE — 99214 OFFICE O/P EST MOD 30 MIN: CPT | Performed by: FAMILY MEDICINE

## 2019-09-19 RX ORDER — TRIAMCINOLONE ACETONIDE 1 MG/G
CREAM TOPICAL 2 TIMES DAILY
Qty: 80 G | Refills: 5 | Status: SHIPPED | OUTPATIENT
Start: 2019-09-19 | End: 2020-02-17 | Stop reason: SDUPTHER

## 2019-09-19 NOTE — ASSESSMENT & PLAN NOTE
Lab Results   Component Value Date    HGBA1C 7 7 (H) 06/04/2019       No results for input(s): POCGLU in the last 72 hours      Blood Sugar Average: Last 72 hrs:  Not using insulin right now due to poor appetite

## 2019-09-19 NOTE — PROGRESS NOTES
Assessment/Plan:    1  Rash  Assessment & Plan:  Itchy dermatitis on arms  Will send to derm for eval    Orders:  -     triamcinolone (KENALOG) 0 1 % cream; Apply topically 2 (two) times a day  -     Ambulatory referral to Dermatology; Future    2  Type 2 diabetes mellitus with diabetic peripheral angiopathy without gangrene, with long-term current use of insulin (HCC)  Assessment & Plan:  Lab Results   Component Value Date    HGBA1C 7 7 (H) 06/04/2019       No results for input(s): POCGLU in the last 72 hours  Blood Sugar Average: Last 72 hrs:  Not using insulin right now due to poor appetite        3  Liver transplant status ([facility] )  Assessment & Plan:  Stable right now  Seeing Gi and prograf level checked      4  Headache, chronic daily  Assessment & Plan:  improved      5  Need for vaccination  -     influenza vaccine, 6767-0495, high-dose, PF 0 5 mL (FLUZONE HIGH-DOSE)    6  History of hepatitis C  Assessment & Plan:  Seeing Gi                There are no Patient Instructions on file for this visit  No follow-ups on file  Subjective:      Patient ID: Jose J Del Rosario is a 68 y o  male  Chief Complaint   Patient presents with    Rash     Patient here with red itchy arms for 3 months        Here for itchy arms for 3 months  Not using any meds right now for it  Getting an eye operation soon- doesn't know for what  Wife is not here today    Rash   This is a chronic problem  The current episode started more than 1 year ago  The problem is unchanged  The affected locations include the right arm and left arm  The rash is characterized by redness and itchiness  He was exposed to nothing  Past treatments include nothing  The treatment provided no relief         The following portions of the patient's history were reviewed and updated as appropriate: allergies, current medications, past family history, past medical history, past social history, past surgical history and problem list     Review of Systems Constitutional: Positive for appetite change and unexpected weight change  HENT: Negative  Eyes: Negative  Respiratory: Negative  Cardiovascular: Negative  Gastrointestinal: Negative  Endocrine: Negative  Genitourinary: Negative  Musculoskeletal: Negative  Skin: Positive for rash  Allergic/Immunologic: Negative  Neurological: Negative  Hematological: Negative  Psychiatric/Behavioral: Negative            Current Outpatient Medications   Medication Sig Dispense Refill    Cetirizine HCl (ZYRTEC ALLERGY) 10 MG CAPS Take 1 tablet by mouth daily as needed      clopidogrel (PLAVIX) 75 mg tablet TAKE 1 TABLET(75 MG) BY MOUTH DAILY 90 tablet 3    glucose blood (GREER CONTOUR TEST) test strip 1 each by Other route 3 (three) times a day Test 300 each 5    insulin detemir (LEVEMIR FLEXTOUCH) 100 Units/mL injection pen Inject 22 Units under the skin daily at bedtime 5 pen 5    Insulin Pen Needle (PEN NEEDLES) 32G X 4 MM MISC by Does not apply route daily at bedtime 100 each 5    INSULIN SYRINGE 1CC/29G 29G X 1/2" 1 ML MISC by Does not apply route      Lancets MISC by Does not apply route      Multiple Vitamin tablet Take by mouth      pregabalin (LYRICA) 50 mg capsule Take 1 capsule (50 mg total) by mouth 3 (three) times a day 90 capsule 3    rosuvastatin (CRESTOR) 20 MG tablet Take 1 tablet (20 mg total) by mouth daily 30 tablet 3    tacrolimus (PROGRAF) 1 mg capsule Take 1 capsule (1 mg total) by mouth every 12 (twelve) hours 180 capsule 3    tamsulosin (FLOMAX) 0 4 mg TAKE 1 CAPSULE(0 4 MG) BY MOUTH DAILY WITH DINNER 90 capsule 6    temazepam (RESTORIL) 30 mg capsule Take 1 capsule (30 mg total) by mouth daily at bedtime 30 capsule 3    traMADol (ULTRAM) 50 mg tablet Take 2 tablets (100 mg total) by mouth every 8 (eight) hours as needed for moderate pain 180 tablet 0    zolpidem (AMBIEN) 10 mg tablet Take 1 tablet (10 mg total) by mouth daily at bedtime 30 tablet 0    triamcinolone (KENALOG) 0 1 % cream Apply topically 2 (two) times a day 80 g 5     No current facility-administered medications for this visit  Objective:    /70   Pulse 71   Temp (!) 97 3 °F (36 3 °C)   Resp 18   Ht 5' 3" (1 6 m)   Wt 56 6 kg (124 lb 12 8 oz)   SpO2 99%   BMI 22 11 kg/m²        Physical Exam   Constitutional: He appears well-developed and well-nourished  HENT:   Head: Normocephalic and atraumatic  Eyes: Pupils are equal, round, and reactive to light  EOM are normal    Neck: Normal range of motion  Neck supple  Cardiovascular: Normal rate, regular rhythm, normal heart sounds and intact distal pulses  Pulses are no weak pulses  Pulses:       Dorsalis pedis pulses are 2+ on the right side, and 2+ on the left side  Posterior tibial pulses are 2+ on the right side, and 2+ on the left side  Pulmonary/Chest: Effort normal and breath sounds normal    Abdominal: Soft  Bowel sounds are normal    Musculoskeletal: Normal range of motion  Feet:   Right Foot:   Skin Integrity: Positive for dry skin  Left Foot:   Skin Integrity: Positive for dry skin  Neurological: He is alert  Skin: Skin is warm  Capillary refill takes less than 2 seconds  Psychiatric: He has a normal mood and affect  His behavior is normal    Nursing note and vitals reviewed  Patient's shoes and socks removed  Right Foot/Ankle   Right Foot Inspection  Skin Exam: skin intact and dry skin                          Toe Exam: ROM and strength within normal limits  Sensory     Proprioception: intact   Monofilament testing: intact  Vascular  Capillary refills: < 3 seconds  The right DP pulse is 2+  The right PT pulse is 2+       Left Foot/Ankle  Left Foot Inspection  Skin Exam: skin intact and dry skin                         Toe Exam: ROM and strength within normal limits                   Sensory     Proprioception: intact  Monofilament: intact  Vascular  Capillary refills: < 3 seconds  The left DP pulse is 2+  The left PT pulse is 2+  Assign Risk Category:  No deformity present; No loss of protective sensation;  No weak pulses       Risk: 0    Norm Ok, DO 47.6

## 2019-09-20 DIAGNOSIS — E13.40 NEUROPATHY DUE TO SECONDARY DIABETES MELLITUS (HCC): ICD-10-CM

## 2019-09-23 DIAGNOSIS — F51.02 ADJUSTMENT INSOMNIA: ICD-10-CM

## 2019-09-23 RX ORDER — ZOLPIDEM TARTRATE 10 MG/1
10 TABLET ORAL
Qty: 30 TABLET | Refills: 0 | OUTPATIENT
Start: 2019-09-23

## 2019-09-23 RX ORDER — TRAMADOL HYDROCHLORIDE 50 MG/1
TABLET ORAL
Qty: 180 TABLET | Refills: 0 | Status: SHIPPED | OUTPATIENT
Start: 2019-09-23 | End: 2019-11-07 | Stop reason: SDUPTHER

## 2019-10-10 ENCOUNTER — TELEPHONE (OUTPATIENT)
Dept: FAMILY MEDICINE CLINIC | Facility: CLINIC | Age: 69
End: 2019-10-10

## 2019-10-10 NOTE — TELEPHONE ENCOUNTER
09/13/2019  1  09/13/2019  ZOLPIDEM TARTRATE 10 MG TABLET  30 0  30  DO MEN  [id number]  [facility] ([id number])  0   Comm Ins  [place]    09/13/2019  1  06/17/2019  TEMAZEPAM 30 MG CAPSULE  30 0  30  LO CIM

## 2019-10-10 NOTE — TELEPHONE ENCOUNTER
Medication:temazepam (RESTORIL) 30 mg capsule      Dosage: 30 mg  How Often:Sig: Take 1 capsule (30 mg total) by mouth daily at bedtime  Quantity:  30  Last Office Visit: 9/19/19  Next Office Visit:10/15/19  Last refilled: written 6/17/19  How many pills left:1  Pharmacy:   [facility] #[id number], [place]  [address]  @[address] [address]  Phone: [phone] Fax: [phone]        Medication:zolpidem (AMBIEN) 10 mg tablet       Dosage:10mg  How Often:Sig: Take 1 tablet (10 mg total) by mouth daily at bedtime  Quantity:  30  Last Office Visit: 9/19/19  Next Office Visit:10/15/19  Last refilled: 9/13/19  How many pills left:1  Pharmacy:   [facility] #[id number], [place]  [address]  @[address] [address]  Phone: [phone] Fax: [phone]

## 2019-10-11 DIAGNOSIS — F51.02 ADJUSTMENT INSOMNIA: ICD-10-CM

## 2019-10-11 DIAGNOSIS — G47.00 INSOMNIA, UNSPECIFIED TYPE: ICD-10-CM

## 2019-10-11 RX ORDER — TEMAZEPAM 30 MG/1
30 CAPSULE ORAL
Qty: 30 CAPSULE | Refills: 3 | Status: SHIPPED | OUTPATIENT
Start: 2019-10-11 | End: 2020-02-03 | Stop reason: SDUPTHER

## 2019-10-11 RX ORDER — ZOLPIDEM TARTRATE 10 MG/1
10 TABLET ORAL
Qty: 30 TABLET | Refills: 3 | Status: SHIPPED | OUTPATIENT
Start: 2019-10-11 | End: 2020-02-03 | Stop reason: SDUPTHER

## 2019-10-14 PROBLEM — E11.22 TYPE 2 DIABETES MELLITUS WITH CHRONIC KIDNEY DISEASE (HCC): Status: ACTIVE | Noted: 2019-10-14

## 2019-10-15 ENCOUNTER — OFFICE VISIT (OUTPATIENT)
Dept: FAMILY MEDICINE CLINIC | Facility: CLINIC | Age: 69
End: 2019-10-15
Payer: MEDICARE

## 2019-10-15 VITALS
SYSTOLIC BLOOD PRESSURE: 138 MMHG | DIASTOLIC BLOOD PRESSURE: 80 MMHG | HEIGHT: 63 IN | OXYGEN SATURATION: 99 % | BODY MASS INDEX: 22.18 KG/M2 | HEART RATE: 51 BPM | RESPIRATION RATE: 16 BRPM | WEIGHT: 125.2 LBS

## 2019-10-15 DIAGNOSIS — E11.40 CONTROLLED TYPE 2 DIABETES MELLITUS WITH DIABETIC NEUROPATHY, WITH LONG-TERM CURRENT USE OF INSULIN (HCC): Primary | ICD-10-CM

## 2019-10-15 DIAGNOSIS — D63.1 ANEMIA DUE TO STAGE 3 CHRONIC KIDNEY DISEASE (HCC): Chronic | ICD-10-CM

## 2019-10-15 DIAGNOSIS — N18.30 ANEMIA DUE TO STAGE 3 CHRONIC KIDNEY DISEASE (HCC): Chronic | ICD-10-CM

## 2019-10-15 DIAGNOSIS — E78.2 MIXED HYPERLIPIDEMIA: ICD-10-CM

## 2019-10-15 DIAGNOSIS — Z79.4 CONTROLLED TYPE 2 DIABETES MELLITUS WITH DIABETIC NEUROPATHY, WITH LONG-TERM CURRENT USE OF INSULIN (HCC): Primary | ICD-10-CM

## 2019-10-15 LAB — SL AMB POCT HEMOGLOBIN AIC: 7.8 (ref ?–6.5)

## 2019-10-15 PROCEDURE — 83036 HEMOGLOBIN GLYCOSYLATED A1C: CPT | Performed by: FAMILY MEDICINE

## 2019-10-15 PROCEDURE — 99214 OFFICE O/P EST MOD 30 MIN: CPT | Performed by: FAMILY MEDICINE

## 2019-10-15 RX ORDER — NEOMYCIN SULFATE, POLYMYXIN B SULFATE, AND DEXAMETHASONE 3.5; 10000; 1 MG/G; [USP'U]/G; MG/G
OINTMENT OPHTHALMIC
Refills: 0 | COMMUNITY
Start: 2019-10-04 | End: 2020-02-17 | Stop reason: ALTCHOICE

## 2019-10-15 RX ORDER — PREDNISOLONE ACETATE 10 MG/ML
SUSPENSION/ DROPS OPHTHALMIC
Refills: 1 | COMMUNITY
Start: 2019-10-04 | End: 2020-02-17 | Stop reason: ALTCHOICE

## 2019-10-15 NOTE — PROGRESS NOTES
Assessment/Plan:    1  Controlled type 2 diabetes mellitus with diabetic neuropathy, with long-term current use of insulin (HCC)  -     POCT hemoglobin A1c    2  Mixed hyperlipidemia  Assessment & Plan:  Stable on crestor      3  Anemia due to stage 3 chronic kidney disease [facility])  Assessment & Plan:  Stable  Seeing nephrology          Recent Results (from the past 24 hour(s))   POCT hemoglobin A1c    Collection Time: 10/15/19  2:49 PM   Result Value Ref Range    Hemoglobin A1C 7 8 (A) 6 5           There are no Patient Instructions on file for this visit  No follow-ups on file  Subjective:      Patient ID: Marlena Garcia is a 68 y o  male  Chief Complaint   Patient presents with    Follow-up     4 month follow up       Here for follow up with daughter  Had eye exam with Dr Holley millan  bs- 147    Diabetes   He presents for his follow-up diabetic visit  He has type 2 diabetes mellitus  His disease course has been stable  There are no hypoglycemic associated symptoms  There are no diabetic associated symptoms  There are no hypoglycemic complications  Symptoms are stable  There are no diabetic complications  His weight is stable  He is following a diabetic diet  When asked about meal planning, he reported none  He rarely participates in exercise  An ACE inhibitor/angiotensin II receptor blocker is not being taken  He does not see a podiatrist Eye exam is current  Hyperlipidemia   This is a chronic problem  The current episode started more than 1 year ago  The problem is controlled  Recent lipid tests were reviewed and are normal  There are no known factors aggravating his hyperlipidemia  Current antihyperlipidemic treatment includes statins  The current treatment provides significant improvement of lipids  There are no compliance problems  Risk factors for coronary artery disease include diabetes mellitus         The following portions of the patient's history were reviewed and updated as appropriate: allergies, current medications, past family history, past medical history, past social history, past surgical history and problem list     Review of Systems   Constitutional: Negative  HENT: Negative  Eyes: Negative  Respiratory: Negative  Cardiovascular: Negative  Gastrointestinal: Negative  Endocrine: Negative  Genitourinary: Negative  Musculoskeletal: Negative  Skin: Negative  Allergic/Immunologic: Negative  Neurological: Negative  Hematological: Negative  Psychiatric/Behavioral: Negative            Current Outpatient Medications   Medication Sig Dispense Refill    Cetirizine HCl (ZYRTEC ALLERGY) 10 MG CAPS Take 1 tablet by mouth daily as needed      clopidogrel (PLAVIX) 75 mg tablet TAKE 1 TABLET(75 MG) BY MOUTH DAILY 90 tablet 3    glucose blood (GREER CONTOUR TEST) test strip 1 each by Other route 3 (three) times a day Test 300 each 5    insulin detemir (LEVEMIR FLEXTOUCH) 100 Units/mL injection pen Inject 22 Units under the skin daily at bedtime 5 pen 5    Insulin Pen Needle (PEN NEEDLES) 32G X 4 MM MISC by Does not apply route daily at bedtime 100 each 5    INSULIN SYRINGE 1CC/29G 29G X 1/2" 1 ML MISC by Does not apply route      Lancets MISC by Does not apply route      Multiple Vitamin tablet Take by mouth      neomycin-polymyxin-dexamethasone (MAXITROL) 0 35%-10,000 units/g-0 1% APPLY ONLY IN OPERATIVE EYE HS  FOR 5 DAYS  0    prednisoLONE acetate (PRED FORTE) 1 % ophthalmic suspension INSTILL 1 DROP INTO OPERATIVE EYE QID AFTER SURGERY  1    pregabalin (LYRICA) 50 mg capsule Take 1 capsule (50 mg total) by mouth 3 (three) times a day 90 capsule 3    rosuvastatin (CRESTOR) 20 MG tablet Take 1 tablet (20 mg total) by mouth daily 30 tablet 3    tacrolimus (PROGRAF) 1 mg capsule Take 1 capsule (1 mg total) by mouth every 12 (twelve) hours 180 capsule 3    tamsulosin (FLOMAX) 0 4 mg TAKE 1 CAPSULE(0 4 MG) BY MOUTH DAILY WITH DINNER 90 capsule 6  temazepam (RESTORIL) 30 mg capsule Take 1 capsule (30 mg total) by mouth daily at bedtime 30 capsule 3    traMADol (ULTRAM) 50 mg tablet TAKE 2 TABLETS(100 MG) BY MOUTH EVERY 8 HOURS AS NEEDED FOR MODERATE PAIN 180 tablet 0    triamcinolone (KENALOG) 0 1 % cream Apply topically 2 (two) times a day 80 g 5    zolpidem (AMBIEN) 10 mg tablet Take 1 tablet (10 mg total) by mouth daily at bedtime 30 tablet 3     No current facility-administered medications for this visit  Objective:    /80 (BP Location: Left arm, Patient Position: Sitting, Cuff Size: Standard)   Pulse (!) 51   Resp 16   Ht 5' 3" (1 6 m)   Wt 56 8 kg (125 lb 3 2 oz)   SpO2 99%   BMI 22 18 kg/m²        Physical Exam   Constitutional: He appears well-developed and well-nourished  HENT:   Head: Normocephalic and atraumatic  Eyes: Pupils are equal, round, and reactive to light  EOM are normal    Neck: Normal range of motion  Neck supple  Cardiovascular: Normal rate, regular rhythm, normal heart sounds and intact distal pulses  Pulmonary/Chest: Effort normal and breath sounds normal    Abdominal: Soft  Bowel sounds are normal    Musculoskeletal: Normal range of motion  Neurological: He is alert  Skin: Skin is warm  Psychiatric: He has a normal mood and affect  His behavior is normal    Nursing note and vitals reviewed               Yanick Dhaliwal DO

## 2019-11-07 DIAGNOSIS — E13.40 NEUROPATHY DUE TO SECONDARY DIABETES MELLITUS (HCC): ICD-10-CM

## 2019-11-07 RX ORDER — TRAMADOL HYDROCHLORIDE 50 MG/1
TABLET ORAL
Qty: 180 TABLET | Refills: 0 | Status: SHIPPED | OUTPATIENT
Start: 2019-11-07 | End: 2019-12-04 | Stop reason: SDUPTHER

## 2019-11-09 DIAGNOSIS — R39.12 WEAK URINE STREAM: ICD-10-CM

## 2019-11-09 DIAGNOSIS — E13.40 NEUROPATHY DUE TO SECONDARY DIABETES MELLITUS (HCC): ICD-10-CM

## 2019-11-09 DIAGNOSIS — N40.0 BENIGN PROSTATIC HYPERPLASIA WITHOUT LOWER URINARY TRACT SYMPTOMS: ICD-10-CM

## 2019-11-09 DIAGNOSIS — E78.2 MIXED HYPERLIPIDEMIA: ICD-10-CM

## 2019-11-11 DIAGNOSIS — E78.5 HYPERLIPIDEMIA, UNSPECIFIED HYPERLIPIDEMIA TYPE: ICD-10-CM

## 2019-11-11 RX ORDER — ROSUVASTATIN CALCIUM 20 MG/1
20 TABLET, COATED ORAL DAILY
Qty: 30 TABLET | Refills: 0 | Status: SHIPPED | OUTPATIENT
Start: 2019-11-11 | End: 2021-04-21 | Stop reason: SDUPTHER

## 2019-11-11 RX ORDER — TAMSULOSIN HYDROCHLORIDE 0.4 MG/1
CAPSULE ORAL
Qty: 90 CAPSULE | Refills: 6 | Status: SHIPPED | OUTPATIENT
Start: 2019-11-11 | End: 2022-06-13 | Stop reason: ALTCHOICE

## 2019-11-11 NOTE — TELEPHONE ENCOUNTER
[facility] pharmacy is requesting a refill on patient Rosuvastatin 20 mg  Please sign off if agreeable  Patient is schedule to see Macey Grimm on 11/21/2019

## 2019-11-12 RX ORDER — ROSUVASTATIN CALCIUM 10 MG/1
TABLET, COATED ORAL
Qty: 90 TABLET | Refills: 0 | OUTPATIENT
Start: 2019-11-12

## 2019-11-12 RX ORDER — INSULIN GLARGINE 100 [IU]/ML
INJECTION, SOLUTION SUBCUTANEOUS
Qty: 10 ML | Refills: 0 | Status: SHIPPED | OUTPATIENT
Start: 2019-11-12 | End: 2020-02-17 | Stop reason: HOSPADM

## 2019-11-18 ENCOUNTER — TELEPHONE (OUTPATIENT)
Dept: NEUROLOGY | Facility: CLINIC | Age: 69
End: 2019-11-18

## 2019-11-19 ENCOUNTER — TRANSCRIBE ORDERS (OUTPATIENT)
Dept: LAB | Facility: HOSPITAL | Age: 69
End: 2019-11-19

## 2019-11-19 ENCOUNTER — APPOINTMENT (OUTPATIENT)
Dept: LAB | Facility: HOSPITAL | Age: 69
End: 2019-11-19
Payer: MEDICARE

## 2019-11-19 DIAGNOSIS — D68.9 BLOOD CLOTTING DISORDER (HCC): ICD-10-CM

## 2019-11-19 DIAGNOSIS — Z94.4 LIVER REPLACED BY TRANSPLANT (HCC): ICD-10-CM

## 2019-11-19 DIAGNOSIS — Z94.4 LIVER REPLACED BY TRANSPLANT (HCC): Primary | ICD-10-CM

## 2019-11-19 DIAGNOSIS — R79.1 ABNORMAL COAGULATION PROFILE: ICD-10-CM

## 2019-11-19 LAB
ALBUMIN SERPL BCP-MCNC: 3.6 G/DL (ref 3.5–5)
ALP SERPL-CCNC: 84 U/L (ref 46–116)
ALT SERPL W P-5'-P-CCNC: 17 U/L (ref 12–78)
AST SERPL W P-5'-P-CCNC: 18 U/L (ref 5–45)
BASOPHILS # BLD AUTO: 0.06 THOUSANDS/ΜL (ref 0–0.1)
BASOPHILS NFR BLD AUTO: 1 % (ref 0–1)
BILIRUB DIRECT SERPL-MCNC: 0.08 MG/DL (ref 0–0.2)
BILIRUB SERPL-MCNC: 0.29 MG/DL (ref 0.2–1)
BUN SERPL-MCNC: 26 MG/DL (ref 5–25)
CALCIUM SERPL-MCNC: 9.2 MG/DL (ref 8.3–10.1)
CHLORIDE SERPL-SCNC: 104 MMOL/L (ref 100–108)
CO2 SERPL-SCNC: 25 MMOL/L (ref 21–32)
CREAT SERPL-MCNC: 1.72 MG/DL (ref 0.6–1.3)
EOSINOPHIL # BLD AUTO: 0.55 THOUSAND/ΜL (ref 0–0.61)
EOSINOPHIL NFR BLD AUTO: 8 % (ref 0–6)
ERYTHROCYTE [DISTWIDTH] IN BLOOD BY AUTOMATED COUNT: 14 % (ref 11.6–15.1)
GFR SERPL CREATININE-BSD FRML MDRD: 39 ML/MIN/1.73SQ M
GGT SERPL-CCNC: 28 U/L (ref 5–85)
GLUCOSE SERPL-MCNC: 163 MG/DL (ref 65–140)
HCT VFR BLD AUTO: 39.7 % (ref 36.5–49.3)
HGB BLD-MCNC: 12 G/DL (ref 12–17)
IMM GRANULOCYTES # BLD AUTO: 0.01 THOUSAND/UL (ref 0–0.2)
IMM GRANULOCYTES NFR BLD AUTO: 0 % (ref 0–2)
INR PPP: 1.08 (ref 0.84–1.19)
LDH SERPL-CCNC: 154 U/L (ref 81–234)
LYMPHOCYTES # BLD AUTO: 2.8 THOUSANDS/ΜL (ref 0.6–4.47)
LYMPHOCYTES NFR BLD AUTO: 41 % (ref 14–44)
MAGNESIUM SERPL-MCNC: 1.7 MG/DL (ref 1.6–2.6)
MCH RBC QN AUTO: 24.7 PG (ref 26.8–34.3)
MCHC RBC AUTO-ENTMCNC: 30.2 G/DL (ref 31.4–37.4)
MCV RBC AUTO: 82 FL (ref 82–98)
MONOCYTES # BLD AUTO: 0.47 THOUSAND/ΜL (ref 0.17–1.22)
MONOCYTES NFR BLD AUTO: 7 % (ref 4–12)
NEUTROPHILS # BLD AUTO: 2.95 THOUSANDS/ΜL (ref 1.85–7.62)
NEUTS SEG NFR BLD AUTO: 43 % (ref 43–75)
NRBC BLD AUTO-RTO: 0 /100 WBCS
PHOSPHATE SERPL-MCNC: 3.3 MG/DL (ref 2.3–4.1)
PLATELET # BLD AUTO: 130 THOUSANDS/UL (ref 149–390)
PMV BLD AUTO: 13.1 FL (ref 8.9–12.7)
POTASSIUM SERPL-SCNC: 4.3 MMOL/L (ref 3.5–5.3)
PROT SERPL-MCNC: 8 G/DL (ref 6.4–8.2)
PROTHROMBIN TIME: 13.6 SECONDS (ref 11.6–14.5)
RBC # BLD AUTO: 4.86 MILLION/UL (ref 3.88–5.62)
SODIUM SERPL-SCNC: 138 MMOL/L (ref 136–145)
WBC # BLD AUTO: 6.84 THOUSAND/UL (ref 4.31–10.16)

## 2019-11-19 PROCEDURE — 80197 ASSAY OF TACROLIMUS: CPT

## 2019-11-19 PROCEDURE — 84100 ASSAY OF PHOSPHORUS: CPT

## 2019-11-19 PROCEDURE — 85610 PROTHROMBIN TIME: CPT

## 2019-11-19 PROCEDURE — 83735 ASSAY OF MAGNESIUM: CPT

## 2019-11-19 PROCEDURE — 83615 LACTATE (LD) (LDH) ENZYME: CPT

## 2019-11-19 PROCEDURE — 80048 BASIC METABOLIC PNL TOTAL CA: CPT

## 2019-11-19 PROCEDURE — 80076 HEPATIC FUNCTION PANEL: CPT

## 2019-11-19 PROCEDURE — 82977 ASSAY OF GGT: CPT

## 2019-11-19 PROCEDURE — 36415 COLL VENOUS BLD VENIPUNCTURE: CPT

## 2019-11-19 PROCEDURE — 85025 COMPLETE CBC W/AUTO DIFF WBC: CPT

## 2019-11-20 LAB — TACROLIMUS BLD-MCNC: 2.2 NG/ML (ref 2–20)

## 2019-12-04 DIAGNOSIS — E13.40 NEUROPATHY DUE TO SECONDARY DIABETES MELLITUS (HCC): ICD-10-CM

## 2019-12-04 DIAGNOSIS — G62.9 NEUROPATHY: ICD-10-CM

## 2019-12-04 RX ORDER — PREGABALIN 50 MG/1
CAPSULE ORAL
Qty: 90 CAPSULE | Refills: 5 | Status: SHIPPED | OUTPATIENT
Start: 2019-12-04 | End: 2020-01-17 | Stop reason: SDUPTHER

## 2019-12-04 RX ORDER — TRAMADOL HYDROCHLORIDE 50 MG/1
TABLET ORAL
Qty: 180 TABLET | Refills: 0 | Status: SHIPPED | OUTPATIENT
Start: 2019-12-04 | End: 2020-02-11

## 2020-01-17 DIAGNOSIS — G62.9 NEUROPATHY: ICD-10-CM

## 2020-01-17 NOTE — TELEPHONE ENCOUNTER
Medication: pregabalin (LYRICA      Dosage: 50 mg capsule       How Often:   TAKE 1 CAPSULE(50 MG) BY MOUTH THREE TIMES DAILY              Quantity:  90  Last Office Visit: 10/15/19  Next Office Visit: 02/17/20  Last refilled: 12/04/19  How many pills left: 2  Pharmacy:   [place] [id number], [place]  [address]   [address] [address]  Phone: [phone] Fax: [phone]

## 2020-01-20 RX ORDER — PREGABALIN 50 MG/1
50 CAPSULE ORAL 3 TIMES DAILY
Qty: 90 CAPSULE | Refills: 5 | Status: SHIPPED | OUTPATIENT
Start: 2020-01-20 | End: 2020-06-10

## 2020-02-03 DIAGNOSIS — G47.00 INSOMNIA, UNSPECIFIED TYPE: ICD-10-CM

## 2020-02-03 DIAGNOSIS — F51.02 ADJUSTMENT INSOMNIA: ICD-10-CM

## 2020-02-03 RX ORDER — TEMAZEPAM 30 MG/1
30 CAPSULE ORAL
Qty: 30 CAPSULE | Refills: 3 | Status: SHIPPED | OUTPATIENT
Start: 2020-02-03 | End: 2020-05-29 | Stop reason: SDUPTHER

## 2020-02-03 RX ORDER — ZOLPIDEM TARTRATE 10 MG/1
10 TABLET ORAL
Qty: 30 TABLET | Refills: 3 | Status: SHIPPED | OUTPATIENT
Start: 2020-02-03 | End: 2020-04-30

## 2020-02-03 NOTE — TELEPHONE ENCOUNTER
01/05/2020  1  10/11/2019  ZOLPIDEM TARTRATE 10 MG TABLET  30 0  30  TI SHE      01/05/2020  1  10/11/2019  TEMAZEPAM 30 MG CAPSULE  30 0  30  TI SHE

## 2020-02-03 NOTE — TELEPHONE ENCOUNTER
Medication: zolpidem (AMBIEN) 10 mg tablet   Dosage: 10 mg  How Often: Daily at bedtime  Quantity:  30  Last Office Visit: 10/15/19  Next Office Visit: 2/17/20  Last refilled: 10/11/19  How many pills left: 1  Pharmacy:   [facility] [place] [id number], [place]   [address]   [address] [address]  Phone: [phone] Fax: [phone]    Medication: temazepam (RESTORIL) 30 mg capsule   Dosage: 30 mg  How Often: Daily at bedtime  Quantity:  30  Last Office Visit: 10/15/19  Next Office Visit: 2/17/20  Last refilled: 10/11/19  How many pills left: 0  Pharmacy:   [facility], [place]   [address]   [address] [address]  Phone: [phone] Fax: [phone]

## 2020-02-11 DIAGNOSIS — E13.40 NEUROPATHY DUE TO SECONDARY DIABETES MELLITUS (HCC): ICD-10-CM

## 2020-02-11 RX ORDER — TRAMADOL HYDROCHLORIDE 50 MG/1
TABLET ORAL
Qty: 180 TABLET | Refills: 0 | Status: SHIPPED | OUTPATIENT
Start: 2020-02-11 | End: 2020-03-30 | Stop reason: SDUPTHER

## 2020-02-12 ENCOUNTER — TELEPHONE (OUTPATIENT)
Dept: FAMILY MEDICINE CLINIC | Facility: CLINIC | Age: 70
End: 2020-02-12

## 2020-02-12 NOTE — TELEPHONE ENCOUNTER
Medication:  traMADol (ULTRAM      Dosage: 50 mg tablet       How Often: TAKE 2 TABLETS(100 MG) BY MOUTH EVERY 8 HOURS AS NEEDED FOR MODERATE PAIN  Quantity:  180  Last Office Visit:   Next Office Visit:  Last refilled:   How many pills left:  Pharmacy:   [facility], [place] - [place]   [address]   [address] [address]  Phone: [phone] Fax: [phone]

## 2020-02-17 ENCOUNTER — OFFICE VISIT (OUTPATIENT)
Dept: FAMILY MEDICINE CLINIC | Facility: CLINIC | Age: 70
End: 2020-02-17
Payer: MEDICARE

## 2020-02-17 VITALS
HEART RATE: 76 BPM | RESPIRATION RATE: 16 BRPM | BODY MASS INDEX: 22.54 KG/M2 | WEIGHT: 127.2 LBS | DIASTOLIC BLOOD PRESSURE: 74 MMHG | OXYGEN SATURATION: 100 % | HEIGHT: 63 IN | SYSTOLIC BLOOD PRESSURE: 144 MMHG | TEMPERATURE: 97.6 F

## 2020-02-17 DIAGNOSIS — N18.30 ANEMIA DUE TO STAGE 3 CHRONIC KIDNEY DISEASE (HCC): Chronic | ICD-10-CM

## 2020-02-17 DIAGNOSIS — Z86.73 HISTORY OF CVA (CEREBROVASCULAR ACCIDENT): ICD-10-CM

## 2020-02-17 DIAGNOSIS — E11.51 TYPE 2 DIABETES MELLITUS WITH DIABETIC PERIPHERAL ANGIOPATHY WITHOUT GANGRENE, WITH LONG-TERM CURRENT USE OF INSULIN (HCC): Primary | ICD-10-CM

## 2020-02-17 DIAGNOSIS — Z79.4 TYPE 2 DIABETES MELLITUS WITH STAGE 3 CHRONIC KIDNEY DISEASE, WITH LONG-TERM CURRENT USE OF INSULIN (HCC): ICD-10-CM

## 2020-02-17 DIAGNOSIS — E11.22 TYPE 2 DIABETES MELLITUS WITH STAGE 3 CHRONIC KIDNEY DISEASE, WITH LONG-TERM CURRENT USE OF INSULIN (HCC): ICD-10-CM

## 2020-02-17 DIAGNOSIS — R21 RASH: ICD-10-CM

## 2020-02-17 DIAGNOSIS — N18.30 TYPE 2 DIABETES MELLITUS WITH STAGE 3 CHRONIC KIDNEY DISEASE, WITH LONG-TERM CURRENT USE OF INSULIN (HCC): ICD-10-CM

## 2020-02-17 DIAGNOSIS — E78.2 MIXED HYPERLIPIDEMIA: ICD-10-CM

## 2020-02-17 DIAGNOSIS — Z79.4 TYPE 2 DIABETES MELLITUS WITH DIABETIC PERIPHERAL ANGIOPATHY WITHOUT GANGRENE, WITH LONG-TERM CURRENT USE OF INSULIN (HCC): Primary | ICD-10-CM

## 2020-02-17 DIAGNOSIS — Z94.4 LIVER TRANSPLANT STATUS (HCC): ICD-10-CM

## 2020-02-17 DIAGNOSIS — D63.1 ANEMIA DUE TO STAGE 3 CHRONIC KIDNEY DISEASE (HCC): Chronic | ICD-10-CM

## 2020-02-17 PROCEDURE — 1160F RVW MEDS BY RX/DR IN RCRD: CPT | Performed by: FAMILY MEDICINE

## 2020-02-17 PROCEDURE — 99214 OFFICE O/P EST MOD 30 MIN: CPT | Performed by: FAMILY MEDICINE

## 2020-02-17 PROCEDURE — 4040F PNEUMOC VAC/ADMIN/RCVD: CPT | Performed by: FAMILY MEDICINE

## 2020-02-17 PROCEDURE — 2022F DILAT RTA XM EVC RTNOPTHY: CPT | Performed by: FAMILY MEDICINE

## 2020-02-17 PROCEDURE — 1036F TOBACCO NON-USER: CPT | Performed by: FAMILY MEDICINE

## 2020-02-17 PROCEDURE — 3066F NEPHROPATHY DOC TX: CPT | Performed by: FAMILY MEDICINE

## 2020-02-17 PROCEDURE — 3008F BODY MASS INDEX DOCD: CPT | Performed by: FAMILY MEDICINE

## 2020-02-17 RX ORDER — TRIAMCINOLONE ACETONIDE 1 MG/G
CREAM TOPICAL 2 TIMES DAILY
Qty: 80 G | Refills: 5 | Status: SHIPPED | OUTPATIENT
Start: 2020-02-17 | End: 2021-01-21 | Stop reason: SDUPTHER

## 2020-02-17 NOTE — PROGRESS NOTES
Assessment/Plan:    1  Type 2 diabetes mellitus with diabetic peripheral angiopathy without gangrene, with long-term current use of insulin (Formerly [facility])  Assessment & Plan:  Check levels  Not eating as many sweets  Lab Results   Component Value Date    HGBA1C 7 8 (A) 10/15/2019       Orders:  -     Hemoglobin A1C; Future; Expected date: 02/17/2020  -     Microalbumin / creatinine urine ratio; Future; Expected date: 02/17/2020    2  Anemia due to stage 3 chronic kidney disease ([facility] )  Assessment & Plan:  Stable  Needs to see nephrology    Orders:  -     CBC; Future; Expected date: 02/17/2020  -     Comprehensive metabolic panel; Future; Expected date: 02/17/2020    3  Mixed hyperlipidemia  Assessment & Plan:  Stable on crestor    Orders:  -     Lipid Panel with Direct LDL reflex; Future; Expected date: 02/17/2020  -     TSH, 3rd generation with Free T4 reflex; Future; Expected date: 02/17/2020    4  History of CVA (cerebrovascular accident)  Assessment & Plan:  Stable no further symptoms      5  Liver transplant status ([facility] )  Assessment & Plan:  Stable  Seeing GI      6  Type 2 diabetes mellitus with stage 3 chronic kidney disease, with long-term current use of insulin (HCC)  Assessment & Plan:  Was seeing Dr Sun Ann  Will order labs  Lab Results   Component Value Date    HGBA1C 7 8 (A) 10/15/2019         7  Rash  -     triamcinolone (KENALOG) 0 1 % cream; Apply topically 2 (two) times a day          There are no Patient Instructions on file for this visit  No follow-ups on file  Subjective:      Patient ID: Jass Spicer is a 68 y o  male  Chief Complaint   Patient presents with    Follow-up     Patient here for 4 month follow up on Type II Diabetes        Here for follow up  Here with wife  Not eating much    Hyperlipidemia   This is a chronic problem  The current episode started more than 1 year ago  The problem is controlled   Recent lipid tests were reviewed and are normal  There are no known factors aggravating his hyperlipidemia  Current antihyperlipidemic treatment includes statins  The current treatment provides significant improvement of lipids  There are no compliance problems  Diabetes   He presents for his follow-up diabetic visit  He has type 2 diabetes mellitus  His disease course has been stable  There are no hypoglycemic associated symptoms  There are no diabetic associated symptoms  There are no hypoglycemic complications  Symptoms are stable  There are no diabetic complications  Risk factors for coronary artery disease include dyslipidemia  Current diabetic treatment includes insulin injections  He is compliant with treatment all of the time  His weight is stable  He is following a diabetic diet  He has not had a previous visit with a dietitian  He rarely participates in exercise  There is no change in his home blood glucose trend  He does not see a podiatrist Eye exam is current  The following portions of the patient's history were reviewed and updated as appropriate: allergies, current medications, past family history, past medical history, past social history, past surgical history and problem list     Review of Systems   Constitutional: Negative  HENT: Negative  Eyes: Negative  Respiratory: Negative  Cardiovascular: Negative  Gastrointestinal: Negative  Endocrine: Negative  Genitourinary: Negative  Musculoskeletal: Negative  Skin: Negative  Allergic/Immunologic: Negative  Neurological: Negative  Hematological: Negative  Psychiatric/Behavioral: Negative            Current Outpatient Medications   Medication Sig Dispense Refill    Cetirizine HCl (ZYRTEC ALLERGY) 10 MG CAPS Take 1 tablet by mouth daily as needed      clopidogrel (PLAVIX) 75 mg tablet TAKE 1 TABLET(75 MG) BY MOUTH DAILY 90 tablet 3    glucose blood (GREER CONTOUR TEST) test strip 1 each by Other route 3 (three) times a day Test 300 each 5    insulin detemir (LEVEMIR FLEXTOUCH) 100 Units/mL injection pen Inject 22 Units under the skin daily at bedtime 5 pen 5    Insulin Pen Needle (PEN NEEDLES) 32G X 4 MM MISC by Does not apply route daily at bedtime 100 each 5    INSULIN SYRINGE 1CC/29G 29G X 1/2" 1 ML MISC by Does not apply route      Lancets MISC by Does not apply route      Multiple Vitamin tablet Take by mouth      pregabalin (LYRICA) 50 mg capsule Take 1 capsule (50 mg total) by mouth 3 (three) times a day 90 capsule 5    rosuvastatin (CRESTOR) 20 MG tablet Take 1 tablet (20 mg total) by mouth daily 30 tablet 0    tacrolimus (PROGRAF) 1 mg capsule Take 1 capsule (1 mg total) by mouth every 12 (twelve) hours 180 capsule 3    tamsulosin (FLOMAX) 0 4 mg TAKE 1 CAPSULE(0 4 MG) BY MOUTH DAILY WITH DINNER 90 capsule 6    temazepam (RESTORIL) 30 mg capsule Take 1 capsule (30 mg total) by mouth daily at bedtime 30 capsule 3    traMADol (ULTRAM) 50 mg tablet TAKE 2 TABLETS(100 MG) BY MOUTH EVERY 8 HOURS AS NEEDED FOR MODERATE PAIN 180 tablet 0    triamcinolone (KENALOG) 0 1 % cream Apply topically 2 (two) times a day 80 g 5    zolpidem (AMBIEN) 10 mg tablet Take 1 tablet (10 mg total) by mouth daily at bedtime 30 tablet 3     No current facility-administered medications for this visit  Objective:    /74   Pulse 76   Temp 97 6 °F (36 4 °C)   Resp 16   Ht 5' 3" (1 6 m)   Wt 57 7 kg (127 lb 3 2 oz)   SpO2 100%   BMI 22 53 kg/m²        Physical Exam   Constitutional: He appears well-developed and well-nourished  HENT:   Head: Normocephalic and atraumatic  Eyes: Pupils are equal, round, and reactive to light  EOM are normal    Neck: Normal range of motion  Neck supple  Cardiovascular: Normal rate, regular rhythm, normal heart sounds and intact distal pulses  Pulmonary/Chest: Effort normal and breath sounds normal    Abdominal: Soft  Bowel sounds are normal    Musculoskeletal: Normal range of motion  Neurological: He is alert  Skin: Skin is warm   Capillary refill takes less than 2 seconds  Psychiatric: He has a normal mood and affect  His behavior is normal  Judgment and thought content normal    Nursing note and vitals reviewed               Rozina Guardian, DO

## 2020-02-17 NOTE — ASSESSMENT & PLAN NOTE
Check levels  Not eating as many sweets  Lab Results   Component Value Date    HGBA1C 7 8 (A) 10/15/2019

## 2020-02-17 NOTE — ASSESSMENT & PLAN NOTE
Was seeing Dr Jossue Everett  Will order labs  Lab Results   Component Value Date    HGBA1C 7 8 (A) 10/15/2019

## 2020-02-18 ENCOUNTER — APPOINTMENT (OUTPATIENT)
Dept: LAB | Facility: HOSPITAL | Age: 70
End: 2020-02-18
Payer: MEDICARE

## 2020-02-18 ENCOUNTER — TRANSCRIBE ORDERS (OUTPATIENT)
Dept: LAB | Facility: HOSPITAL | Age: 70
End: 2020-02-18

## 2020-02-18 DIAGNOSIS — Z94.4 LIVER REPLACED BY TRANSPLANT (HCC): ICD-10-CM

## 2020-02-18 DIAGNOSIS — R79.1 ABNORMAL COAGULATION PROFILE: ICD-10-CM

## 2020-02-18 DIAGNOSIS — Z94.4 LIVER REPLACED BY TRANSPLANT (HCC): Primary | ICD-10-CM

## 2020-02-18 DIAGNOSIS — E78.2 MIXED HYPERLIPIDEMIA: ICD-10-CM

## 2020-02-18 DIAGNOSIS — Z79.4 TYPE 2 DIABETES MELLITUS WITH DIABETIC PERIPHERAL ANGIOPATHY WITHOUT GANGRENE, WITH LONG-TERM CURRENT USE OF INSULIN (HCC): ICD-10-CM

## 2020-02-18 DIAGNOSIS — E11.51 TYPE 2 DIABETES MELLITUS WITH DIABETIC PERIPHERAL ANGIOPATHY WITHOUT GANGRENE, WITH LONG-TERM CURRENT USE OF INSULIN (HCC): ICD-10-CM

## 2020-02-18 DIAGNOSIS — D68.9 BLOOD CLOTTING DISORDER (HCC): ICD-10-CM

## 2020-02-18 DIAGNOSIS — N18.30 ANEMIA DUE TO STAGE 3 CHRONIC KIDNEY DISEASE (HCC): Chronic | ICD-10-CM

## 2020-02-18 DIAGNOSIS — D63.1 ANEMIA DUE TO STAGE 3 CHRONIC KIDNEY DISEASE (HCC): Chronic | ICD-10-CM

## 2020-02-18 LAB
ALBUMIN SERPL BCP-MCNC: 3.5 G/DL (ref 3.5–5)
ALP SERPL-CCNC: 88 U/L (ref 46–116)
ALT SERPL W P-5'-P-CCNC: 21 U/L (ref 12–78)
ANION GAP SERPL CALCULATED.3IONS-SCNC: 4 MMOL/L (ref 4–13)
AST SERPL W P-5'-P-CCNC: 20 U/L (ref 5–45)
BASOPHILS # BLD AUTO: 0.06 THOUSANDS/ΜL (ref 0–0.1)
BASOPHILS NFR BLD AUTO: 1 % (ref 0–1)
BILIRUB DIRECT SERPL-MCNC: 0.12 MG/DL (ref 0–0.2)
BILIRUB SERPL-MCNC: 0.36 MG/DL (ref 0.2–1)
BUN SERPL-MCNC: 34 MG/DL (ref 5–25)
CALCIUM SERPL-MCNC: 8.8 MG/DL (ref 8.3–10.1)
CHLORIDE SERPL-SCNC: 106 MMOL/L (ref 100–108)
CHOLEST SERPL-MCNC: 182 MG/DL (ref 50–200)
CO2 SERPL-SCNC: 29 MMOL/L (ref 21–32)
CREAT SERPL-MCNC: 1.55 MG/DL (ref 0.6–1.3)
CREAT UR-MCNC: 99.8 MG/DL
EOSINOPHIL # BLD AUTO: 0.6 THOUSAND/ΜL (ref 0–0.61)
EOSINOPHIL NFR BLD AUTO: 8 % (ref 0–6)
ERYTHROCYTE [DISTWIDTH] IN BLOOD BY AUTOMATED COUNT: 14.4 % (ref 11.6–15.1)
EST. AVERAGE GLUCOSE BLD GHB EST-MCNC: 192 MG/DL
GFR SERPL CREATININE-BSD FRML MDRD: 44 ML/MIN/1.73SQ M
GGT SERPL-CCNC: 45 U/L (ref 5–85)
GLUCOSE P FAST SERPL-MCNC: 67 MG/DL (ref 65–99)
HBA1C MFR BLD: 8.3 %
HCT VFR BLD AUTO: 38 % (ref 36.5–49.3)
HDLC SERPL-MCNC: 44 MG/DL
HGB BLD-MCNC: 11.5 G/DL (ref 12–17)
IMM GRANULOCYTES # BLD AUTO: 0.01 THOUSAND/UL (ref 0–0.2)
IMM GRANULOCYTES NFR BLD AUTO: 0 % (ref 0–2)
INR PPP: 1.1 (ref 0.84–1.19)
LDH SERPL-CCNC: 152 U/L (ref 81–234)
LDLC SERPL CALC-MCNC: 109 MG/DL (ref 0–100)
LYMPHOCYTES # BLD AUTO: 3.51 THOUSANDS/ΜL (ref 0.6–4.47)
LYMPHOCYTES NFR BLD AUTO: 43 % (ref 14–44)
MAGNESIUM SERPL-MCNC: 1.8 MG/DL (ref 1.6–2.6)
MCH RBC QN AUTO: 24.5 PG (ref 26.8–34.3)
MCHC RBC AUTO-ENTMCNC: 30.3 G/DL (ref 31.4–37.4)
MCV RBC AUTO: 81 FL (ref 82–98)
MICROALBUMIN UR-MCNC: 69.2 MG/L (ref 0–20)
MICROALBUMIN/CREAT 24H UR: 69 MG/G CREATININE (ref 0–30)
MONOCYTES # BLD AUTO: 0.57 THOUSAND/ΜL (ref 0.17–1.22)
MONOCYTES NFR BLD AUTO: 7 % (ref 4–12)
NEUTROPHILS # BLD AUTO: 3.23 THOUSANDS/ΜL (ref 1.85–7.62)
NEUTS SEG NFR BLD AUTO: 41 % (ref 43–75)
NRBC BLD AUTO-RTO: 0 /100 WBCS
PHOSPHATE SERPL-MCNC: 3.9 MG/DL (ref 2.3–4.1)
PLATELET # BLD AUTO: 149 THOUSANDS/UL (ref 149–390)
PMV BLD AUTO: 12.2 FL (ref 8.9–12.7)
POTASSIUM SERPL-SCNC: 3.9 MMOL/L (ref 3.5–5.3)
PROT SERPL-MCNC: 7.9 G/DL (ref 6.4–8.2)
PROTHROMBIN TIME: 13.8 SECONDS (ref 11.6–14.5)
RBC # BLD AUTO: 4.7 MILLION/UL (ref 3.88–5.62)
SODIUM SERPL-SCNC: 139 MMOL/L (ref 136–145)
TRIGL SERPL-MCNC: 145 MG/DL
TSH SERPL DL<=0.05 MIU/L-ACNC: 3.7 UIU/ML (ref 0.36–3.74)
WBC # BLD AUTO: 7.98 THOUSAND/UL (ref 4.31–10.16)

## 2020-02-18 PROCEDURE — 80197 ASSAY OF TACROLIMUS: CPT

## 2020-02-18 PROCEDURE — 85025 COMPLETE CBC W/AUTO DIFF WBC: CPT

## 2020-02-18 PROCEDURE — 84443 ASSAY THYROID STIM HORMONE: CPT

## 2020-02-18 PROCEDURE — 83036 HEMOGLOBIN GLYCOSYLATED A1C: CPT

## 2020-02-18 PROCEDURE — 82043 UR ALBUMIN QUANTITATIVE: CPT

## 2020-02-18 PROCEDURE — 80061 LIPID PANEL: CPT

## 2020-02-18 PROCEDURE — 82570 ASSAY OF URINE CREATININE: CPT

## 2020-02-18 PROCEDURE — 80053 COMPREHEN METABOLIC PANEL: CPT

## 2020-02-18 PROCEDURE — 84100 ASSAY OF PHOSPHORUS: CPT

## 2020-02-18 PROCEDURE — 83615 LACTATE (LD) (LDH) ENZYME: CPT

## 2020-02-18 PROCEDURE — 83735 ASSAY OF MAGNESIUM: CPT

## 2020-02-18 PROCEDURE — 82977 ASSAY OF GGT: CPT

## 2020-02-18 PROCEDURE — 36415 COLL VENOUS BLD VENIPUNCTURE: CPT

## 2020-02-18 PROCEDURE — 82248 BILIRUBIN DIRECT: CPT

## 2020-02-18 PROCEDURE — 85610 PROTHROMBIN TIME: CPT

## 2020-02-19 LAB — TACROLIMUS BLD-MCNC: 2.3 NG/ML (ref 2–20)

## 2020-03-30 DIAGNOSIS — E13.40 NEUROPATHY DUE TO SECONDARY DIABETES MELLITUS (HCC): ICD-10-CM

## 2020-03-30 RX ORDER — TRAMADOL HYDROCHLORIDE 50 MG/1
50 TABLET ORAL EVERY 8 HOURS PRN
Qty: 180 TABLET | Refills: 0 | Status: SHIPPED | OUTPATIENT
Start: 2020-03-30 | End: 2020-05-14 | Stop reason: SDUPTHER

## 2020-03-30 NOTE — TELEPHONE ENCOUNTER
MEDICATION CORRECT  Medication:  Dosage:  How Often:  Quantity:  180  Last Office Visit: 2/17/20  Next Office Visit: 6/23/20  Last refilled:   How many pills left: 1  Pharmacy:   Regla Padilla [facility], [place]  [address]   [address] [address]  Phone: [phone] Fax: [phone]

## 2020-03-30 NOTE — TELEPHONE ENCOUNTER
Medication:Tramadol 50mg  PDMP   02/11/2020  1  02/11/2020  TRAMADOL HCL 50 MG TABLET  180 0  30  TI SHE       Active agreement on file -No

## 2020-03-31 ENCOUNTER — DOCUMENTATION (OUTPATIENT)
Dept: FAMILY MEDICINE CLINIC | Facility: OTHER | Age: 70
End: 2020-03-31

## 2020-04-20 ENCOUNTER — TELEPHONE (OUTPATIENT)
Dept: NEPHROLOGY | Facility: CLINIC | Age: 70
End: 2020-04-20

## 2020-04-29 ENCOUNTER — TELEMEDICINE (OUTPATIENT)
Dept: NEPHROLOGY | Facility: CLINIC | Age: 70
End: 2020-04-29
Payer: MEDICARE

## 2020-04-29 VITALS — DIASTOLIC BLOOD PRESSURE: 72 MMHG | SYSTOLIC BLOOD PRESSURE: 120 MMHG | HEART RATE: 80 BPM

## 2020-04-29 DIAGNOSIS — N28.9 RENAL INSUFFICIENCY: Primary | ICD-10-CM

## 2020-04-29 PROCEDURE — 99443 PR PHYS/QHP TELEPHONE EVALUATION 21-30 MIN: CPT | Performed by: INTERNAL MEDICINE

## 2020-04-30 DIAGNOSIS — F51.02 ADJUSTMENT INSOMNIA: ICD-10-CM

## 2020-04-30 RX ORDER — ZOLPIDEM TARTRATE 10 MG/1
TABLET ORAL
Qty: 30 TABLET | Refills: 3 | Status: SHIPPED | OUTPATIENT
Start: 2020-04-30 | End: 2020-05-29 | Stop reason: SDUPTHER

## 2020-05-14 DIAGNOSIS — E13.40 NEUROPATHY DUE TO SECONDARY DIABETES MELLITUS (HCC): ICD-10-CM

## 2020-05-14 RX ORDER — TRAMADOL HYDROCHLORIDE 50 MG/1
50 TABLET ORAL EVERY 8 HOURS PRN
Qty: 180 TABLET | Refills: 0 | Status: SHIPPED | OUTPATIENT
Start: 2020-05-14 | End: 2020-07-24 | Stop reason: SDUPTHER

## 2020-05-29 DIAGNOSIS — F51.02 ADJUSTMENT INSOMNIA: ICD-10-CM

## 2020-05-29 DIAGNOSIS — G47.00 INSOMNIA, UNSPECIFIED TYPE: ICD-10-CM

## 2020-05-29 RX ORDER — TEMAZEPAM 30 MG/1
30 CAPSULE ORAL
Qty: 30 CAPSULE | Refills: 3 | Status: SHIPPED | OUTPATIENT
Start: 2020-05-29 | End: 2020-09-21 | Stop reason: SDUPTHER

## 2020-05-29 RX ORDER — ZOLPIDEM TARTRATE 10 MG/1
10 TABLET ORAL
Qty: 30 TABLET | Refills: 3 | Status: SHIPPED | OUTPATIENT
Start: 2020-05-29 | End: 2020-09-21 | Stop reason: SDUPTHER

## 2020-06-04 ENCOUNTER — APPOINTMENT (OUTPATIENT)
Dept: LAB | Facility: HOSPITAL | Age: 70
End: 2020-06-04
Payer: MEDICARE

## 2020-06-04 ENCOUNTER — TRANSCRIBE ORDERS (OUTPATIENT)
Dept: LAB | Facility: HOSPITAL | Age: 70
End: 2020-06-04

## 2020-06-04 DIAGNOSIS — Z94.4 STATUS POST LIVER TRANSPLANT (HCC): Primary | ICD-10-CM

## 2020-06-04 DIAGNOSIS — Z94.4 STATUS POST LIVER TRANSPLANT (HCC): ICD-10-CM

## 2020-06-04 LAB
ALBUMIN SERPL BCP-MCNC: 3.5 G/DL (ref 3.5–5)
ALP SERPL-CCNC: 87 U/L (ref 46–116)
ALT SERPL W P-5'-P-CCNC: 19 U/L (ref 12–78)
AST SERPL W P-5'-P-CCNC: 16 U/L (ref 5–45)
BILIRUB DIRECT SERPL-MCNC: 0.08 MG/DL (ref 0–0.2)
BILIRUB SERPL-MCNC: 0.24 MG/DL (ref 0.2–1)
BUN SERPL-MCNC: 28 MG/DL (ref 5–25)
CALCIUM SERPL-MCNC: 9.4 MG/DL (ref 8.3–10.1)
CHLORIDE SERPL-SCNC: 108 MMOL/L (ref 100–108)
CO2 SERPL-SCNC: 26 MMOL/L (ref 21–32)
CREAT SERPL-MCNC: 2.06 MG/DL (ref 0.6–1.3)
GFR SERPL CREATININE-BSD FRML MDRD: 31 ML/MIN/1.73SQ M
GGT SERPL-CCNC: 25 U/L (ref 5–85)
GLUCOSE SERPL-MCNC: 92 MG/DL (ref 65–140)
INR PPP: 1.06 (ref 0.84–1.19)
LDH SERPL-CCNC: 186 U/L (ref 81–234)
MAGNESIUM SERPL-MCNC: 1.9 MG/DL (ref 1.6–2.6)
PHOSPHATE SERPL-MCNC: 4 MG/DL (ref 2.3–4.1)
POTASSIUM SERPL-SCNC: 4.6 MMOL/L (ref 3.5–5.3)
PROT SERPL-MCNC: 8 G/DL (ref 6.4–8.2)
PROTHROMBIN TIME: 13.4 SECONDS (ref 11.6–14.5)
SODIUM SERPL-SCNC: 140 MMOL/L (ref 136–145)
TACROLIMUS BLD-MCNC: 4.2 NG/ML (ref 2–20)

## 2020-06-04 PROCEDURE — 36415 COLL VENOUS BLD VENIPUNCTURE: CPT

## 2020-06-04 PROCEDURE — 83615 LACTATE (LD) (LDH) ENZYME: CPT

## 2020-06-04 PROCEDURE — 80048 BASIC METABOLIC PNL TOTAL CA: CPT

## 2020-06-04 PROCEDURE — 82977 ASSAY OF GGT: CPT

## 2020-06-04 PROCEDURE — 83735 ASSAY OF MAGNESIUM: CPT

## 2020-06-04 PROCEDURE — 85610 PROTHROMBIN TIME: CPT

## 2020-06-04 PROCEDURE — 84100 ASSAY OF PHOSPHORUS: CPT

## 2020-06-04 PROCEDURE — 80197 ASSAY OF TACROLIMUS: CPT

## 2020-06-04 PROCEDURE — 80076 HEPATIC FUNCTION PANEL: CPT

## 2020-06-08 ENCOUNTER — APPOINTMENT (OUTPATIENT)
Dept: LAB | Facility: HOSPITAL | Age: 70
End: 2020-06-08
Payer: MEDICARE

## 2020-06-08 DIAGNOSIS — Z94.4 STATUS POST LIVER TRANSPLANT (HCC): ICD-10-CM

## 2020-06-08 LAB
BASOPHILS # BLD AUTO: 0.05 THOUSANDS/ΜL (ref 0–0.1)
BASOPHILS NFR BLD AUTO: 1 % (ref 0–1)
EOSINOPHIL # BLD AUTO: 0.54 THOUSAND/ΜL (ref 0–0.61)
EOSINOPHIL NFR BLD AUTO: 8 % (ref 0–6)
ERYTHROCYTE [DISTWIDTH] IN BLOOD BY AUTOMATED COUNT: 15.2 % (ref 11.6–15.1)
HCT VFR BLD AUTO: 37.4 % (ref 36.5–49.3)
HGB BLD-MCNC: 11.3 G/DL (ref 12–17)
IMM GRANULOCYTES # BLD AUTO: 0.01 THOUSAND/UL (ref 0–0.2)
IMM GRANULOCYTES NFR BLD AUTO: 0 % (ref 0–2)
LYMPHOCYTES # BLD AUTO: 2.4 THOUSANDS/ΜL (ref 0.6–4.47)
LYMPHOCYTES NFR BLD AUTO: 36 % (ref 14–44)
MCH RBC QN AUTO: 24.3 PG (ref 26.8–34.3)
MCHC RBC AUTO-ENTMCNC: 30.2 G/DL (ref 31.4–37.4)
MCV RBC AUTO: 80 FL (ref 82–98)
MONOCYTES # BLD AUTO: 0.51 THOUSAND/ΜL (ref 0.17–1.22)
MONOCYTES NFR BLD AUTO: 8 % (ref 4–12)
NEUTROPHILS # BLD AUTO: 3.12 THOUSANDS/ΜL (ref 1.85–7.62)
NEUTS SEG NFR BLD AUTO: 47 % (ref 43–75)
NRBC BLD AUTO-RTO: 0 /100 WBCS
PLATELET # BLD AUTO: 121 THOUSANDS/UL (ref 149–390)
PMV BLD AUTO: 12 FL (ref 8.9–12.7)
RBC # BLD AUTO: 4.65 MILLION/UL (ref 3.88–5.62)
WBC # BLD AUTO: 6.63 THOUSAND/UL (ref 4.31–10.16)

## 2020-06-08 PROCEDURE — 85025 COMPLETE CBC W/AUTO DIFF WBC: CPT

## 2020-06-08 PROCEDURE — 36415 COLL VENOUS BLD VENIPUNCTURE: CPT

## 2020-06-10 DIAGNOSIS — G62.9 NEUROPATHY: ICD-10-CM

## 2020-06-10 RX ORDER — PREGABALIN 50 MG/1
CAPSULE ORAL
Qty: 90 CAPSULE | Refills: 3 | Status: SHIPPED | OUTPATIENT
Start: 2020-06-10 | End: 2020-11-16 | Stop reason: SDUPTHER

## 2020-06-23 ENCOUNTER — OFFICE VISIT (OUTPATIENT)
Dept: FAMILY MEDICINE CLINIC | Facility: CLINIC | Age: 70
End: 2020-06-23
Payer: MEDICARE

## 2020-06-23 VITALS
TEMPERATURE: 98.2 F | SYSTOLIC BLOOD PRESSURE: 140 MMHG | HEART RATE: 71 BPM | DIASTOLIC BLOOD PRESSURE: 72 MMHG | BODY MASS INDEX: 22.96 KG/M2 | HEIGHT: 63 IN | RESPIRATION RATE: 15 BRPM | OXYGEN SATURATION: 98 % | WEIGHT: 129.6 LBS

## 2020-06-23 DIAGNOSIS — N18.30 ANEMIA DUE TO STAGE 3 CHRONIC KIDNEY DISEASE (HCC): Chronic | ICD-10-CM

## 2020-06-23 DIAGNOSIS — Z79.4 TYPE 2 DIABETES MELLITUS WITH DIABETIC PERIPHERAL ANGIOPATHY WITHOUT GANGRENE, WITH LONG-TERM CURRENT USE OF INSULIN (HCC): Primary | ICD-10-CM

## 2020-06-23 DIAGNOSIS — Z00.00 MEDICARE ANNUAL WELLNESS VISIT, SUBSEQUENT: ICD-10-CM

## 2020-06-23 DIAGNOSIS — D63.1 ANEMIA DUE TO STAGE 3 CHRONIC KIDNEY DISEASE (HCC): Chronic | ICD-10-CM

## 2020-06-23 DIAGNOSIS — Z94.4 LIVER TRANSPLANT STATUS (HCC): ICD-10-CM

## 2020-06-23 DIAGNOSIS — E11.51 TYPE 2 DIABETES MELLITUS WITH DIABETIC PERIPHERAL ANGIOPATHY WITHOUT GANGRENE, WITH LONG-TERM CURRENT USE OF INSULIN (HCC): Primary | ICD-10-CM

## 2020-06-23 DIAGNOSIS — E78.2 MIXED HYPERLIPIDEMIA: ICD-10-CM

## 2020-06-23 DIAGNOSIS — L98.9 HAND LESION: ICD-10-CM

## 2020-06-23 LAB — SL AMB POCT HEMOGLOBIN AIC: 8.3 (ref ?–6.5)

## 2020-06-23 PROCEDURE — 3060F POS MICROALBUMINURIA REV: CPT | Performed by: FAMILY MEDICINE

## 2020-06-23 PROCEDURE — 4040F PNEUMOC VAC/ADMIN/RCVD: CPT | Performed by: FAMILY MEDICINE

## 2020-06-23 PROCEDURE — 3052F HG A1C>EQUAL 8.0%<EQUAL 9.0%: CPT | Performed by: FAMILY MEDICINE

## 2020-06-23 PROCEDURE — 1160F RVW MEDS BY RX/DR IN RCRD: CPT | Performed by: FAMILY MEDICINE

## 2020-06-23 PROCEDURE — 3008F BODY MASS INDEX DOCD: CPT | Performed by: FAMILY MEDICINE

## 2020-06-23 PROCEDURE — G0439 PPPS, SUBSEQ VISIT: HCPCS | Performed by: FAMILY MEDICINE

## 2020-06-23 PROCEDURE — 3066F NEPHROPATHY DOC TX: CPT | Performed by: FAMILY MEDICINE

## 2020-06-23 PROCEDURE — 83036 HEMOGLOBIN GLYCOSYLATED A1C: CPT | Performed by: FAMILY MEDICINE

## 2020-06-23 PROCEDURE — 2022F DILAT RTA XM EVC RTNOPTHY: CPT | Performed by: FAMILY MEDICINE

## 2020-06-23 PROCEDURE — 1125F AMNT PAIN NOTED PAIN PRSNT: CPT | Performed by: FAMILY MEDICINE

## 2020-06-23 PROCEDURE — 99214 OFFICE O/P EST MOD 30 MIN: CPT | Performed by: FAMILY MEDICINE

## 2020-06-23 PROCEDURE — 1036F TOBACCO NON-USER: CPT | Performed by: FAMILY MEDICINE

## 2020-06-23 PROCEDURE — 1123F ACP DISCUSS/DSCN MKR DOCD: CPT | Performed by: FAMILY MEDICINE

## 2020-06-23 PROCEDURE — 1170F FXNL STATUS ASSESSED: CPT | Performed by: FAMILY MEDICINE

## 2020-07-22 ENCOUNTER — TELEPHONE (OUTPATIENT)
Dept: NEPHROLOGY | Facility: CLINIC | Age: 70
End: 2020-07-22

## 2020-07-24 DIAGNOSIS — E13.40 NEUROPATHY DUE TO SECONDARY DIABETES MELLITUS (HCC): ICD-10-CM

## 2020-07-24 RX ORDER — TRAMADOL HYDROCHLORIDE 50 MG/1
50 TABLET ORAL EVERY 8 HOURS PRN
Qty: 180 TABLET | Refills: 0 | Status: SHIPPED | OUTPATIENT
Start: 2020-07-24 | End: 2020-09-21 | Stop reason: SDUPTHER

## 2020-07-24 NOTE — TELEPHONE ENCOUNTER
Medication:Tramadol  PDMP   05/20/2020  1  05/14/2020  TRAMADOL HCL 50 MG TABLET  180 0  60  TI SHE   Active agreement on file -No

## 2020-07-24 NOTE — TELEPHONE ENCOUNTER
Medication:traMADol (ULTRAM) 50 mg tablet       Dosage:50 mg  How Often:Take 1 tablet (50 mg total) by mouth every 8 (eight) hours as needed for moderate pain  Quantity:  180  Last Office Visit: 06/23/2020  Next Office Visit:10/21/2020  Last refilled:05/14/2020  How many pills left:0  Pharmacy:   Nikunj Jacobs [facility], [place] - [facility]   [address] [place], [place] [address]  Phone: [phone] Fax: [phone]

## 2020-07-27 DIAGNOSIS — I63.9 LEFT THALAMIC INFARCTION (HCC): ICD-10-CM

## 2020-07-27 RX ORDER — CLOPIDOGREL BISULFATE 75 MG/1
TABLET ORAL
Qty: 90 TABLET | Refills: 3 | Status: SHIPPED | OUTPATIENT
Start: 2020-07-27 | End: 2021-05-06 | Stop reason: SDUPTHER

## 2020-07-31 DIAGNOSIS — Z79.4 CONTROLLED TYPE 2 DIABETES MELLITUS WITH DIABETIC NEUROPATHY, WITH LONG-TERM CURRENT USE OF INSULIN (HCC): ICD-10-CM

## 2020-07-31 DIAGNOSIS — E11.40 CONTROLLED TYPE 2 DIABETES MELLITUS WITH DIABETIC NEUROPATHY, WITH LONG-TERM CURRENT USE OF INSULIN (HCC): ICD-10-CM

## 2020-07-31 RX ORDER — INSULIN DETEMIR 100 [IU]/ML
INJECTION, SOLUTION SUBCUTANEOUS
Qty: 15 ML | Refills: 5 | Status: SHIPPED | OUTPATIENT
Start: 2020-07-31 | End: 2021-08-30 | Stop reason: SDUPTHER

## 2020-09-04 ENCOUNTER — APPOINTMENT (OUTPATIENT)
Dept: LAB | Facility: HOSPITAL | Age: 70
End: 2020-09-04
Attending: INTERNAL MEDICINE
Payer: MEDICARE

## 2020-09-04 ENCOUNTER — TRANSCRIBE ORDERS (OUTPATIENT)
Dept: LAB | Facility: HOSPITAL | Age: 70
End: 2020-09-04

## 2020-09-04 DIAGNOSIS — N28.9 RENAL INSUFFICIENCY: ICD-10-CM

## 2020-09-04 DIAGNOSIS — Z94.4 STATUS POST LIVER TRANSPLANT (HCC): Primary | ICD-10-CM

## 2020-09-04 DIAGNOSIS — Z94.4 STATUS POST LIVER TRANSPLANT (HCC): ICD-10-CM

## 2020-09-04 LAB
ALBUMIN SERPL BCP-MCNC: 3.6 G/DL (ref 3.5–5)
ALP SERPL-CCNC: 81 U/L (ref 46–116)
ALT SERPL W P-5'-P-CCNC: 17 U/L (ref 12–78)
ANION GAP SERPL CALCULATED.3IONS-SCNC: 6 MMOL/L (ref 4–13)
AST SERPL W P-5'-P-CCNC: 19 U/L (ref 5–45)
BASOPHILS # BLD AUTO: 0.05 THOUSANDS/ΜL (ref 0–0.1)
BASOPHILS NFR BLD AUTO: 1 % (ref 0–1)
BILIRUB DIRECT SERPL-MCNC: 0.1 MG/DL (ref 0–0.2)
BILIRUB SERPL-MCNC: 0.38 MG/DL (ref 0.2–1)
BUN SERPL-MCNC: 35 MG/DL (ref 5–25)
CALCIUM SERPL-MCNC: 9.3 MG/DL (ref 8.3–10.1)
CHLORIDE SERPL-SCNC: 106 MMOL/L (ref 100–108)
CO2 SERPL-SCNC: 27 MMOL/L (ref 21–32)
CREAT SERPL-MCNC: 2.2 MG/DL (ref 0.6–1.3)
CREAT UR-MCNC: 138 MG/DL
EOSINOPHIL # BLD AUTO: 0.54 THOUSAND/ΜL (ref 0–0.61)
EOSINOPHIL NFR BLD AUTO: 7 % (ref 0–6)
ERYTHROCYTE [DISTWIDTH] IN BLOOD BY AUTOMATED COUNT: 14.5 % (ref 11.6–15.1)
GFR SERPL CREATININE-BSD FRML MDRD: 29 ML/MIN/1.73SQ M
GLUCOSE P FAST SERPL-MCNC: 117 MG/DL (ref 65–99)
HCT VFR BLD AUTO: 40.9 % (ref 36.5–49.3)
HGB BLD-MCNC: 12.4 G/DL (ref 12–17)
IMM GRANULOCYTES # BLD AUTO: 0.01 THOUSAND/UL (ref 0–0.2)
IMM GRANULOCYTES NFR BLD AUTO: 0 % (ref 0–2)
INR PPP: 1.04 (ref 0.84–1.19)
LDH SERPL-CCNC: 171 U/L (ref 81–234)
LYMPHOCYTES # BLD AUTO: 2.42 THOUSANDS/ΜL (ref 0.6–4.47)
LYMPHOCYTES NFR BLD AUTO: 32 % (ref 14–44)
MAGNESIUM SERPL-MCNC: 2 MG/DL (ref 1.6–2.6)
MCH RBC QN AUTO: 24.6 PG (ref 26.8–34.3)
MCHC RBC AUTO-ENTMCNC: 30.3 G/DL (ref 31.4–37.4)
MCV RBC AUTO: 81 FL (ref 82–98)
MICROALBUMIN UR-MCNC: 85.5 MG/L (ref 0–20)
MICROALBUMIN/CREAT 24H UR: 62 MG/G CREATININE (ref 0–30)
MONOCYTES # BLD AUTO: 0.53 THOUSAND/ΜL (ref 0.17–1.22)
MONOCYTES NFR BLD AUTO: 7 % (ref 4–12)
NEUTROPHILS # BLD AUTO: 4.02 THOUSANDS/ΜL (ref 1.85–7.62)
NEUTS SEG NFR BLD AUTO: 53 % (ref 43–75)
NRBC BLD AUTO-RTO: 0 /100 WBCS
PHOSPHATE SERPL-MCNC: 3.4 MG/DL (ref 2.3–4.1)
PLATELET # BLD AUTO: 139 THOUSANDS/UL (ref 149–390)
PMV BLD AUTO: 13 FL (ref 8.9–12.7)
POTASSIUM SERPL-SCNC: 4.7 MMOL/L (ref 3.5–5.3)
PROT SERPL-MCNC: 8.6 G/DL (ref 6.4–8.2)
PROTHROMBIN TIME: 13.6 SECONDS (ref 11.6–14.5)
RBC # BLD AUTO: 5.04 MILLION/UL (ref 3.88–5.62)
SODIUM SERPL-SCNC: 139 MMOL/L (ref 136–145)
WBC # BLD AUTO: 7.57 THOUSAND/UL (ref 4.31–10.16)

## 2020-09-04 PROCEDURE — 36415 COLL VENOUS BLD VENIPUNCTURE: CPT

## 2020-09-04 PROCEDURE — 82043 UR ALBUMIN QUANTITATIVE: CPT

## 2020-09-04 PROCEDURE — 80197 ASSAY OF TACROLIMUS: CPT

## 2020-09-04 PROCEDURE — 80048 BASIC METABOLIC PNL TOTAL CA: CPT

## 2020-09-04 PROCEDURE — 83615 LACTATE (LD) (LDH) ENZYME: CPT

## 2020-09-04 PROCEDURE — 83735 ASSAY OF MAGNESIUM: CPT

## 2020-09-04 PROCEDURE — 85025 COMPLETE CBC W/AUTO DIFF WBC: CPT

## 2020-09-04 PROCEDURE — 82570 ASSAY OF URINE CREATININE: CPT

## 2020-09-04 PROCEDURE — 84100 ASSAY OF PHOSPHORUS: CPT

## 2020-09-04 PROCEDURE — 80076 HEPATIC FUNCTION PANEL: CPT

## 2020-09-04 PROCEDURE — 85610 PROTHROMBIN TIME: CPT

## 2020-09-05 LAB — TACROLIMUS BLD-MCNC: 3.1 NG/ML (ref 2–20)

## 2020-09-06 DIAGNOSIS — Z94.4 LIVER TRANSPLANTED (HCC): ICD-10-CM

## 2020-09-08 RX ORDER — TACROLIMUS 1 MG/1
CAPSULE ORAL
Qty: 180 CAPSULE | Refills: 3 | Status: SHIPPED | OUTPATIENT
Start: 2020-09-08 | End: 2021-01-07 | Stop reason: SDUPTHER

## 2020-09-09 ENCOUNTER — OFFICE VISIT (OUTPATIENT)
Dept: NEPHROLOGY | Facility: CLINIC | Age: 70
End: 2020-09-09

## 2020-09-09 VITALS — DIASTOLIC BLOOD PRESSURE: 76 MMHG | HEART RATE: 70 BPM | SYSTOLIC BLOOD PRESSURE: 140 MMHG

## 2020-09-09 DIAGNOSIS — N28.9 RENAL INSUFFICIENCY: Primary | ICD-10-CM

## 2020-09-09 PROCEDURE — 99214 OFFICE O/P EST MOD 30 MIN: CPT | Performed by: INTERNAL MEDICINE

## 2020-09-09 NOTE — LETTER
September 9, 2020     Billy Evaristo, [address] [facility]  [id number]    Patient: Madelaine Perez   YOB: 1950   Date of Visit: 9/9/2020       Dear Dr Wilma Silvestre:    Thank you for referring Madelaine Perez to me for evaluation  Below are my notes for this consultation  If you have questions, please do not hesitate to call me  I look forward to following your patient along with you  Sincerely,        Brenda Dixon MD        CC: No Recipients  Brenda Dixon MD  9/9/2020  3:35 PM  Sign when Signing Visit  NEPHROLOGY PROGRESS NOTE    Madelaine Perez 65 y o  male MRN: [id number]  Unit/Bed#:  Encounter: [id number]  Reason for Consult:  Chronic kidney disease    The patient is here for routine follow-up he says he feels great has had no intercurrent illnesses and no changes in medications which she is tolerating without side effects  His wife had gastric bypass surgery and has lost weight is feeling great as well and overall they are doing extremely well  ASSESSMENT/PLAN:  1  Renal    Patient's chronic kidney disease with no significant proteinuria as he only has very minimal microalbumin levels  That tells me it is not related to diabetic nephropathy and he does not really have a significant intrinsic nephritis  His latest creatinine is 2 2 which is slightly higher than his last level and potentially could have subtle volume depletion as he was fasting and was towards the end of the summer  Also he has had his liver transplant for 20 years and he has been on tacrolimus so he likely has some chronic interstitial disease related to that medication and he is aware of that  At this point things remain stable clinically  Blood pressure is acceptable on current medications  The patient does not take any NSAIDs      He was instructed to continue his current medications with no changes focus on glycemic control and will continue to monitor with blood pressure control as well     SUBJECTIVE:  Review of Systems   Constitution: Negative for chills, decreased appetite, fever and malaise/fatigue  HENT: Negative  Eyes: Negative  Cardiovascular: Negative for chest pain, dyspnea on exertion, leg swelling and orthopnea  Respiratory: Negative  Negative for cough, shortness of breath, sputum production and wheezing  Skin: Negative  Gastrointestinal: Negative  Negative for bloating, abdominal pain, diarrhea, nausea and vomiting  Genitourinary: Negative  Negative for dysuria, flank pain, hematuria and incomplete emptying  Neurological: Negative  Negative for difficulty with concentration, dizziness, focal weakness and headaches  Psychiatric/Behavioral: Negative  Negative for altered mental status, depression, hallucinations and hypervigilance  OBJECTIVE:  Current Weight:    [email or web address] com:     Blood pressure 140/76, pulse 70  , There is no height or weight on file to calculate BMI  [unfilled]    Physical Exam: /76 (BP Location: Left arm, Patient Position: Sitting, Cuff Size: Standard)   Pulse 70   Physical Exam  Constitutional:       Appearance: Normal appearance  He is not ill-appearing or diaphoretic  HENT:      Head: Normocephalic and atraumatic  Nose:      Comments: Wearing mask     Mouth/Throat:      Comments: Wearing mask  Eyes:      General: No scleral icterus  Extraocular Movements: Extraocular movements intact  Neck:      Musculoskeletal: Normal range of motion and neck supple  Cardiovascular:      Rate and Rhythm: Normal rate and regular rhythm  Heart sounds: No friction rub  No gallop  Comments: No edema  Pulmonary:      Effort: Pulmonary effort is normal  No respiratory distress  Breath sounds: Normal breath sounds  No wheezing, rhonchi or rales  Abdominal:      General: Bowel sounds are normal  There is no distension  Palpations: Abdomen is soft  Tenderness:  There is no abdominal tenderness  There is no rebound  Skin:     General: Skin is warm and dry  Neurological:      General: No focal deficit present  Mental Status: He is alert and oriented to person, place, and time  Mental status is at baseline  Psychiatric:         Mood and Affect: Mood normal          Behavior: Behavior normal          Thought Content:  Thought content normal          Medications:    Current Outpatient Medications:     Cetirizine HCl (ZYRTEC ALLERGY) 10 MG CAPS, Take 1 tablet by mouth daily as needed, Disp: , Rfl:     clopidogrel (PLAVIX) 75 mg tablet, TAKE 1 TABLET(75 MG) BY MOUTH DAILY, Disp: 90 tablet, Rfl: 3    glucose blood (GREER CONTOUR TEST) test strip, 1 each by Other route 3 (three) times a day Test, Disp: 300 each, Rfl: 5    Insulin Pen Needle (PEN NEEDLES) 32G X 4 MM MISC, by Does not apply route daily at bedtime, Disp: 100 each, Rfl: 5    INSULIN SYRINGE 1CC/29G 29G X 1/2" 1 ML MISC, by Does not apply route, Disp: , Rfl:     Lancets MISC, by Does not apply route, Disp: , Rfl:     LEVEMIR FLEXTOUCH 100 units/mL injection pen, ADMINISTER 22 UNITS UNDER THE SKIN DAILY AT BEDTIME, Disp: 15 mL, Rfl: 5    Multiple Vitamin tablet, Take by mouth, Disp: , Rfl:     pregabalin (LYRICA) 50 mg capsule, TAKE 1 CAPSULE(50 MG) BY MOUTH THREE TIMES DAILY, Disp: 90 capsule, Rfl: 3    rosuvastatin (CRESTOR) 20 MG tablet, Take 1 tablet (20 mg total) by mouth daily, Disp: 30 tablet, Rfl: 0    tacrolimus (PROGRAF) 1 mg capsule, TAKE 1 CAPSULE BY MOUTH EVERY 12 HOURS, Disp: 180 capsule, Rfl: 3    tamsulosin (FLOMAX) 0 4 mg, TAKE 1 CAPSULE(0 4 MG) BY MOUTH DAILY WITH DINNER, Disp: 90 capsule, Rfl: 6    temazepam (RESTORIL) 30 mg capsule, Take 1 capsule (30 mg total) by mouth daily at bedtime, Disp: 30 capsule, Rfl: 3    traMADol (ULTRAM) 50 mg tablet, Take 1 tablet (50 mg total) by mouth every 8 (eight) hours as needed for moderate pain, Disp: 180 tablet, Rfl: 0    triamcinolone (KENALOG) 0 1 % cream, Apply topically 2 (two) times a day, Disp: 80 g, Rfl: 5    zolpidem (AMBIEN) 10 mg tablet, Take 1 tablet (10 mg total) by mouth daily at bedtime, Disp: 30 tablet, Rfl: 3    Laboratory Results:  Lab Results   Component Value Date    WBC 7 57 09/04/2020    HGB 12 4 09/04/2020    HCT 40 9 09/04/2020    MCV 81 (L) 09/04/2020     (L) 09/04/2020     Lab Results   Component Value Date    SODIUM 139 09/04/2020    K 4 7 09/04/2020     09/04/2020    CO2 27 09/04/2020    BUN 35 (H) 09/04/2020    CREATININE 2 20 (H) 09/04/2020    GLUC 92 06/04/2020    CALCIUM 9 3 09/04/2020     Lab Results   Component Value Date    CALCIUM 9 3 09/04/2020    PHOS 3 4 09/04/2020     No results found for: LABPROT

## 2020-09-09 NOTE — PROGRESS NOTES
NEPHROLOGY PROGRESS NOTE    Xena Ribera 65 y o  male MRN: [id number]  Unit/Bed#:  Encounter: [id number]  Reason for Consult:  Chronic kidney disease    The patient is here for routine follow-up he says he feels great has had no intercurrent illnesses and no changes in medications which she is tolerating without side effects  His wife had gastric bypass surgery and has lost weight is feeling great as well and overall they are doing extremely well  ASSESSMENT/PLAN:  1  Renal    Patient's chronic kidney disease with no significant proteinuria as he only has very minimal microalbumin levels  That tells me it is not related to diabetic nephropathy and he does not really have a significant intrinsic nephritis  His latest creatinine is 2 2 which is slightly higher than his last level and potentially could have subtle volume depletion as he was fasting and was towards the end of the summer  Also he has had his liver transplant for 20 years and he has been on tacrolimus so he likely has some chronic interstitial disease related to that medication and he is aware of that  At this point things remain stable clinically  Blood pressure is acceptable on current medications  The patient does not take any NSAIDs  He was instructed to continue his current medications with no changes focus on glycemic control and will continue to monitor with blood pressure control as well  SUBJECTIVE:  Review of Systems   Constitution: Negative for chills, decreased appetite, fever and malaise/fatigue  HENT: Negative  Eyes: Negative  Cardiovascular: Negative for chest pain, dyspnea on exertion, leg swelling and orthopnea  Respiratory: Negative  Negative for cough, shortness of breath, sputum production and wheezing  Skin: Negative  Gastrointestinal: Negative  Negative for bloating, abdominal pain, diarrhea, nausea and vomiting  Genitourinary: Negative    Negative for dysuria, flank pain, hematuria and incomplete emptying  Neurological: Negative  Negative for difficulty with concentration, dizziness, focal weakness and headaches  Psychiatric/Behavioral: Negative  Negative for altered mental status, depression, hallucinations and hypervigilance  OBJECTIVE:  Current Weight:    [email or web address] com:     Blood pressure 140/76, pulse 70  , There is no height or weight on file to calculate BMI  [unfilled]    Physical Exam: /76 (BP Location: Left arm, Patient Position: Sitting, Cuff Size: Standard)   Pulse 70   Physical Exam  Constitutional:       Appearance: Normal appearance  He is not ill-appearing or diaphoretic  HENT:      Head: Normocephalic and atraumatic  Nose:      Comments: Wearing mask     Mouth/Throat:      Comments: Wearing mask  Eyes:      General: No scleral icterus  Extraocular Movements: Extraocular movements intact  Neck:      Musculoskeletal: Normal range of motion and neck supple  Cardiovascular:      Rate and Rhythm: Normal rate and regular rhythm  Heart sounds: No friction rub  No gallop  Comments: No edema  Pulmonary:      Effort: Pulmonary effort is normal  No respiratory distress  Breath sounds: Normal breath sounds  No wheezing, rhonchi or rales  Abdominal:      General: Bowel sounds are normal  There is no distension  Palpations: Abdomen is soft  Tenderness: There is no abdominal tenderness  There is no rebound  Skin:     General: Skin is warm and dry  Neurological:      General: No focal deficit present  Mental Status: He is alert and oriented to person, place, and time  Mental status is at baseline  Psychiatric:         Mood and Affect: Mood normal          Behavior: Behavior normal          Thought Content:  Thought content normal          Medications:    Current Outpatient Medications:     Cetirizine HCl (ZYRTEC ALLERGY) 10 MG CAPS, Take 1 tablet by mouth daily as needed, Disp: , Rfl:     clopidogrel (PLAVIX) 75 mg tablet, TAKE 1 TABLET(75 MG) BY MOUTH DAILY, Disp: 90 tablet, Rfl: 3    glucose blood (GREER CONTOUR TEST) test strip, 1 each by Other route 3 (three) times a day Test, Disp: 300 each, Rfl: 5    Insulin Pen Needle (PEN NEEDLES) 32G X 4 MM MISC, by Does not apply route daily at bedtime, Disp: 100 each, Rfl: 5    INSULIN SYRINGE 1CC/29G 29G X 1/2" 1 ML MISC, by Does not apply route, Disp: , Rfl:     Lancets MISC, by Does not apply route, Disp: , Rfl:     LEVEMIR FLEXTOUCH 100 units/mL injection pen, ADMINISTER 22 UNITS UNDER THE SKIN DAILY AT BEDTIME, Disp: 15 mL, Rfl: 5    Multiple Vitamin tablet, Take by mouth, Disp: , Rfl:     pregabalin (LYRICA) 50 mg capsule, TAKE 1 CAPSULE(50 MG) BY MOUTH THREE TIMES DAILY, Disp: 90 capsule, Rfl: 3    rosuvastatin (CRESTOR) 20 MG tablet, Take 1 tablet (20 mg total) by mouth daily, Disp: 30 tablet, Rfl: 0    tacrolimus (PROGRAF) 1 mg capsule, TAKE 1 CAPSULE BY MOUTH EVERY 12 HOURS, Disp: 180 capsule, Rfl: 3    tamsulosin (FLOMAX) 0 4 mg, TAKE 1 CAPSULE(0 4 MG) BY MOUTH DAILY WITH DINNER, Disp: 90 capsule, Rfl: 6    temazepam (RESTORIL) 30 mg capsule, Take 1 capsule (30 mg total) by mouth daily at bedtime, Disp: 30 capsule, Rfl: 3    traMADol (ULTRAM) 50 mg tablet, Take 1 tablet (50 mg total) by mouth every 8 (eight) hours as needed for moderate pain, Disp: 180 tablet, Rfl: 0    triamcinolone (KENALOG) 0 1 % cream, Apply topically 2 (two) times a day, Disp: 80 g, Rfl: 5    zolpidem (AMBIEN) 10 mg tablet, Take 1 tablet (10 mg total) by mouth daily at bedtime, Disp: 30 tablet, Rfl: 3    Laboratory Results:  Lab Results   Component Value Date    WBC 7 57 09/04/2020    HGB 12 4 09/04/2020    HCT 40 9 09/04/2020    MCV 81 (L) 09/04/2020     (L) 09/04/2020     Lab Results   Component Value Date    SODIUM 139 09/04/2020    K 4 7 09/04/2020     09/04/2020    CO2 27 09/04/2020    BUN 35 (H) 09/04/2020    CREATININE 2 20 (H) 09/04/2020    GLUC 92 06/04/2020 CALCIUM 9 3 09/04/2020     Lab Results   Component Value Date    CALCIUM 9 3 09/04/2020    PHOS 3 4 09/04/2020     No results found for: LABPROT

## 2020-09-09 NOTE — PATIENT INSTRUCTIONS
You are here for follow-up in you look great and it sounds like her health has been good with no visits to the hospital in your on the same medications  Your creatinine which is the blood test for the kidney function is stable at 2 2  It is a little bit higher than 6 months ago or so but I suspect he may have had a little subtle dehydration because of the heat and also you were fasting free of specimen  You do not have any evidence of diabetic kidney disease because the protein in the urine is still very low  I suspect that it is due to aging of the kidney or nephrosclerosis and potentially a component related to the tacrolimus medicine be as you of had your liver transplant for 20 years and been on the medication  Overall things are stable your doing great blood pressure is under good control so no changes  Labs and follow-up as scheduled

## 2020-09-17 ENCOUNTER — TELEPHONE (OUTPATIENT)
Dept: FAMILY MEDICINE CLINIC | Facility: CLINIC | Age: 70
End: 2020-09-17

## 2020-09-17 DIAGNOSIS — R42 DIZZINESS: Primary | ICD-10-CM

## 2020-09-17 NOTE — TELEPHONE ENCOUNTER
Keyur Petty called since Guy Jackson has had dizziness for 5 days, but it is getting worse  I put him in for a [facility] with Dr Giovany Salvador today @ [facility] went to the Neurologist after his stroke, but then stopped going  Would Dr Giovany Salvador prefer he calls his neurologist? If so, we will cancel his appt  Please advise  Thank you!

## 2020-09-17 NOTE — TELEPHONE ENCOUNTER
KAILA CALLED BACK ABOUT PT AND SHE HAD CALLED NEURO AND THEY SAID THEY NEED AN ORDER IN THE SYSTEM FOR PT TO SEE THEM [address] [facility] [address] [facility] [phone]

## 2020-09-21 DIAGNOSIS — G47.00 INSOMNIA, UNSPECIFIED TYPE: ICD-10-CM

## 2020-09-21 DIAGNOSIS — F51.02 ADJUSTMENT INSOMNIA: ICD-10-CM

## 2020-09-21 DIAGNOSIS — E13.40 NEUROPATHY DUE TO SECONDARY DIABETES MELLITUS (HCC): ICD-10-CM

## 2020-09-21 RX ORDER — ZOLPIDEM TARTRATE 10 MG/1
10 TABLET ORAL
Qty: 30 TABLET | Refills: 3 | Status: SHIPPED | OUTPATIENT
Start: 2020-09-21 | End: 2021-01-07 | Stop reason: SDUPTHER

## 2020-09-21 RX ORDER — TEMAZEPAM 30 MG/1
30 CAPSULE ORAL
Qty: 30 CAPSULE | Refills: 3 | Status: SHIPPED | OUTPATIENT
Start: 2020-09-21 | End: 2021-01-07 | Stop reason: SDUPTHER

## 2020-09-21 RX ORDER — TRAMADOL HYDROCHLORIDE 50 MG/1
50 TABLET ORAL EVERY 8 HOURS PRN
Qty: 180 TABLET | Refills: 0 | Status: SHIPPED | OUTPATIENT
Start: 2020-09-21 | End: 2020-11-03 | Stop reason: SDUPTHER

## 2020-09-21 NOTE — TELEPHONE ENCOUNTER
Medication:temazepam (RESTORIL) 30 mg capsule       Dosage:30 mg   How Often:Take 1 capsule (30 mg total) by mouth daily at bedtime  Quantity:  30  Last Office Visit: 06/23/20  Next Office Visit:10/21/20  Last refilled:unk  How many pills left:0  Pharmacy:   [facility], [place] - [address]  [address] [address]  Phone: [phone] Fax: [phone]        Medication:zolpidem (AMBIEN) 10 mg tablet       Dosage:10 mg  How Often:Take 1 tablet (10 mg total) by mouth daily at bedtime  Quantity:  30  Last Office Visit: 06/23/20  Next Office Visit:10/21/20  Last refilled:unk  How many pills left:0  Pharmacy:   [facility], [place] - [address] [address] [address] [address]  Phone: [phone] Fax: [phone]          Medication:traMADol (ULTRAM) 50 mg tablet       Dosage:50 mg  How Often:Take 1 tablet (50 mg total) by mouth every 8 (eight) hours as needed for moderate pain   Quantity:  180  Last Office Visit: 06/23/20  Next Office Visit:10/21/20  Last refilled:unk  How many pills left:0  Pharmacy:   [facility], [place] [facility]   [address] [place] [address]  Phone: [phone] Fax: [phone]

## 2020-09-21 NOTE — TELEPHONE ENCOUNTER
Medication:Temazepam, Zolpidem  PDMP   08/22/2020  1  05/29/2020  TEMAZEPAM 30 MG CAPSULE  30 0  30  [facility]  [id number]  [facility]AL[facility] ([id number])  2   [place]  [place]    08/22/2020  1  05/29/2020  ZOLPIDEM TARTRATE 10 MG TABLET  30 0  30  [facility]                 Active agreement on file -No

## 2020-10-21 ENCOUNTER — OFFICE VISIT (OUTPATIENT)
Dept: FAMILY MEDICINE CLINIC | Facility: CLINIC | Age: 70
End: 2020-10-21
Payer: MEDICARE

## 2020-10-21 VITALS
HEART RATE: 66 BPM | RESPIRATION RATE: 16 BRPM | OXYGEN SATURATION: 100 % | DIASTOLIC BLOOD PRESSURE: 74 MMHG | HEIGHT: 63 IN | SYSTOLIC BLOOD PRESSURE: 140 MMHG | TEMPERATURE: 97.7 F | BODY MASS INDEX: 23.35 KG/M2 | WEIGHT: 131.8 LBS

## 2020-10-21 DIAGNOSIS — Z79.4 CONTROLLED TYPE 2 DIABETES MELLITUS WITH DIABETIC NEUROPATHY, WITH LONG-TERM CURRENT USE OF INSULIN (HCC): Primary | ICD-10-CM

## 2020-10-21 DIAGNOSIS — G62.9 PERIPHERAL POLYNEUROPATHY: ICD-10-CM

## 2020-10-21 DIAGNOSIS — D69.6 THROMBOCYTOPENIA (HCC): ICD-10-CM

## 2020-10-21 DIAGNOSIS — E78.2 MIXED HYPERLIPIDEMIA: ICD-10-CM

## 2020-10-21 DIAGNOSIS — E11.40 CONTROLLED TYPE 2 DIABETES MELLITUS WITH DIABETIC NEUROPATHY, WITH LONG-TERM CURRENT USE OF INSULIN (HCC): Primary | ICD-10-CM

## 2020-10-21 PROCEDURE — 99214 OFFICE O/P EST MOD 30 MIN: CPT | Performed by: FAMILY MEDICINE

## 2020-10-21 NOTE — NURSING NOTE
Paged on call SLIM to modify BG checks  Currently listed as q6hrs  Patient is not NPO and currently on regular diet   SD 12/14/2018 [id number]

## 2020-10-30 ENCOUNTER — TELEPHONE (OUTPATIENT)
Dept: FAMILY MEDICINE CLINIC | Facility: CLINIC | Age: 70
End: 2020-10-30

## 2020-11-03 DIAGNOSIS — E13.40 NEUROPATHY DUE TO SECONDARY DIABETES MELLITUS (HCC): ICD-10-CM

## 2020-11-03 RX ORDER — TRAMADOL HYDROCHLORIDE 50 MG/1
50 TABLET ORAL EVERY 8 HOURS PRN
Qty: 180 TABLET | Refills: 0 | Status: SHIPPED | OUTPATIENT
Start: 2020-11-03 | End: 2021-01-07 | Stop reason: SDUPTHER

## 2020-11-06 ENCOUNTER — TELEPHONE (OUTPATIENT)
Dept: FAMILY MEDICINE CLINIC | Facility: CLINIC | Age: 70
End: 2020-11-06

## 2020-11-11 ENCOUNTER — TELEPHONE (OUTPATIENT)
Dept: NEUROLOGY | Facility: CLINIC | Age: 70
End: 2020-11-11

## 2020-11-12 DIAGNOSIS — Z11.59 SPECIAL SCREENING EXAMINATION FOR UNSPECIFIED VIRAL DISEASE: ICD-10-CM

## 2020-11-12 PROCEDURE — U0003 INFECTIOUS AGENT DETECTION BY NUCLEIC ACID (DNA OR RNA); SEVERE ACUTE RESPIRATORY SYNDROME CORONAVIRUS 2 (SARS-COV-2) (CORONAVIRUS DISEASE [COVID-19]), AMPLIFIED PROBE TECHNIQUE, MAKING USE OF HIGH THROUGHPUT TECHNOLOGIES AS DESCRIBED BY CMS-2020-01-R: HCPCS | Performed by: INTERNAL MEDICINE

## 2020-11-13 LAB — SARS-COV-2 RNA SPEC QL NAA+PROBE: NOT DETECTED

## 2020-11-16 ENCOUNTER — TELEPHONE (OUTPATIENT)
Dept: FAMILY MEDICINE CLINIC | Facility: CLINIC | Age: 70
End: 2020-11-16

## 2020-11-16 DIAGNOSIS — G62.9 NEUROPATHY: ICD-10-CM

## 2020-11-16 RX ORDER — PREGABALIN 50 MG/1
50 CAPSULE ORAL 3 TIMES DAILY
Qty: 90 CAPSULE | Refills: 3 | Status: SHIPPED | OUTPATIENT
Start: 2020-11-16 | End: 2020-12-24 | Stop reason: SDUPTHER

## 2020-12-14 ENCOUNTER — TELEPHONE (OUTPATIENT)
Dept: FAMILY MEDICINE CLINIC | Facility: CLINIC | Age: 70
End: 2020-12-14

## 2020-12-24 DIAGNOSIS — G62.9 NEUROPATHY: ICD-10-CM

## 2020-12-24 RX ORDER — PREGABALIN 50 MG/1
50 CAPSULE ORAL 3 TIMES DAILY
Qty: 90 CAPSULE | Refills: 3 | Status: SHIPPED | OUTPATIENT
Start: 2020-12-24 | End: 2021-05-06 | Stop reason: SDUPTHER

## 2021-01-07 DIAGNOSIS — E13.40 NEUROPATHY DUE TO SECONDARY DIABETES MELLITUS (HCC): ICD-10-CM

## 2021-01-07 DIAGNOSIS — G47.00 INSOMNIA, UNSPECIFIED TYPE: ICD-10-CM

## 2021-01-07 DIAGNOSIS — F51.02 ADJUSTMENT INSOMNIA: ICD-10-CM

## 2021-01-07 DIAGNOSIS — Z94.4 LIVER TRANSPLANTED (HCC): ICD-10-CM

## 2021-01-07 RX ORDER — ZOLPIDEM TARTRATE 10 MG/1
10 TABLET ORAL
Qty: 30 TABLET | Refills: 3 | Status: SHIPPED | OUTPATIENT
Start: 2021-01-07 | End: 2021-04-12 | Stop reason: SDUPTHER

## 2021-01-07 RX ORDER — TACROLIMUS 1 MG/1
1 CAPSULE ORAL EVERY 12 HOURS
Qty: 180 CAPSULE | Refills: 3 | Status: SHIPPED | OUTPATIENT
Start: 2021-01-07 | End: 2021-05-06 | Stop reason: SDUPTHER

## 2021-01-07 RX ORDER — TEMAZEPAM 30 MG/1
30 CAPSULE ORAL
Qty: 30 CAPSULE | Refills: 3 | Status: SHIPPED | OUTPATIENT
Start: 2021-01-07 | End: 2021-05-06 | Stop reason: SDUPTHER

## 2021-01-07 RX ORDER — TRAMADOL HYDROCHLORIDE 50 MG/1
50 TABLET ORAL EVERY 8 HOURS PRN
Qty: 180 TABLET | Refills: 0 | Status: SHIPPED | OUTPATIENT
Start: 2021-01-07 | End: 2021-04-09 | Stop reason: SDUPTHER

## 2021-01-07 NOTE — TELEPHONE ENCOUNTER
Medication:traMADol (ULTRAM) 50 mg tablet       Dosage:  How Often:Sig: Take 1 tablet (50 mg total) by mouth every 8 (eight) hours as needed for moderate pain  Quantity:  180  Last Office Visit: 10/21/2020  Next Office Visit:4/21/2021  Last refilled: 11/3/2020  How many pills left:  Pharmacy:   [address] [place] [address] DRUG STORE [facility] [place] [facility], [place] - [address]  [address] [place] [address]  Phone: [phone] Fax: [phone]      Medication:temazepam (RESTORIL) 30 mg capsule       Dosage:  How Often:Sig: Take 1 capsule (30 mg total) by mouth daily at bedtime  Quantity:  30  Last Office Visit: 10/21/2020  Next Office Visit:4/21/2021  Last refilled:written 9/21/2020  How many pills left:  Pharmacy:   [facility] [address] [facility] [place] [facility] [place] - [address]  [address] [place] [address]  Phone: [phone] Fax: [phone]    Medication:zolpidem (AMBIEN) 10 mg tablet       Dosage:  How Often:Sig: Take 1 tablet (10 mg total) by mouth daily at bedtime  Quantity:  30  Last Office Visit: 10/21/2020  Next Office Visit:4/21/2021  Last refilled: written 9/21/2020  How many pills left:  Pharmacy:   [facility] [address] [facility] [place] [facility] [place] - [address]  [address] [place] [address]  Phone: [phone] Fax: [phone]    Medication:tacrolimus (PROGRAF) 1 mg capsule       Dosage:  How Often:     Sig: TAKE 1 CAPSULE BY MOUTH EVERY 12 HOURS    Quantity:  180  Last Office Visit: 10/21/2020  Next Office Visit:4/21/2021  Last refilled: written 9/8/2020  How many pills left:  Pharmacy:   [facility] [place] [facility], [place] [facility]   [address] [address]  Phone: [phone] Fax: [phone]

## 2021-01-07 NOTE — TELEPHONE ENCOUNTER
Medication:  PDMP   11/23/2020  1  11/03/2020  TRAMADOL HCL 50 MG TABLET  180 0  60  TI SHE      12/13/2020  1  09/21/2020  ZOLPIDEM TARTRATE 10 MG TABLET  30 0  30  TI SHE     12/13/2020  1  09/21/2020  TEMAZEPAM 30 MG CAPSULE  30 0  30  TI SHE      Active agreement on file -No

## 2021-01-21 DIAGNOSIS — R21 RASH: ICD-10-CM

## 2021-01-21 RX ORDER — TRIAMCINOLONE ACETONIDE 1 MG/G
CREAM TOPICAL 2 TIMES DAILY
Qty: 80 G | Refills: 5 | Status: SHIPPED | OUTPATIENT
Start: 2021-01-21 | End: 2021-05-06 | Stop reason: SDUPTHER

## 2021-01-21 NOTE — TELEPHONE ENCOUNTER
Medication: triamcinolone (KENALOG  Dosage: 0 1 % cream   How Often: Apply topically 2 (two) times a day  Quantity:  80 g  Last Office Visit: 10/21/20  Next Office Visit: 04/21/21  Last refilled: 02/17/20  How many pills left: none  Pharmacy:   [facility] [address], [place] - [place]   [address] [address]  Phone: [phone] Fax: [phone]

## 2021-03-16 NOTE — TELEPHONE ENCOUNTER
----- Message from Faye Butler MD sent at 2/28/2019 10:12 AM EST -----  Regarding: Statin  Clinical Team: Please will you give Chandra Allen a call? We are waiting for his lipid panel that Dustin Alexander recommended at his visit and then we really do need to start him on a statin to help lower his risk of having another stroke  We need him to get that done    Also, as he had rash and itching on Lipitor please will you add it to his allergy list?  Thanks!! (1) More than 48 hours/None

## 2021-03-29 ENCOUNTER — TRANSCRIBE ORDERS (OUTPATIENT)
Dept: LAB | Facility: HOSPITAL | Age: 71
End: 2021-03-29

## 2021-03-29 ENCOUNTER — APPOINTMENT (OUTPATIENT)
Dept: LAB | Facility: HOSPITAL | Age: 71
End: 2021-03-29
Payer: MEDICARE

## 2021-03-29 DIAGNOSIS — Z79.4 CONTROLLED TYPE 2 DIABETES MELLITUS WITH DIABETIC NEUROPATHY, WITH LONG-TERM CURRENT USE OF INSULIN (HCC): ICD-10-CM

## 2021-03-29 DIAGNOSIS — R79.1 ABNORMAL COAGULATION PROFILE: ICD-10-CM

## 2021-03-29 DIAGNOSIS — D68.9 BLOOD CLOTTING DISORDER (HCC): ICD-10-CM

## 2021-03-29 DIAGNOSIS — Z94.4 LIVER REPLACED BY TRANSPLANT (HCC): Primary | ICD-10-CM

## 2021-03-29 DIAGNOSIS — E78.2 MIXED HYPERLIPIDEMIA: ICD-10-CM

## 2021-03-29 DIAGNOSIS — N28.9 RENAL INSUFFICIENCY: ICD-10-CM

## 2021-03-29 DIAGNOSIS — E11.40 CONTROLLED TYPE 2 DIABETES MELLITUS WITH DIABETIC NEUROPATHY, WITH LONG-TERM CURRENT USE OF INSULIN (HCC): ICD-10-CM

## 2021-03-29 DIAGNOSIS — Z94.4 LIVER REPLACED BY TRANSPLANT (HCC): ICD-10-CM

## 2021-03-29 LAB
ALBUMIN SERPL BCP-MCNC: 3.4 G/DL (ref 3.5–5)
ALP SERPL-CCNC: 91 U/L (ref 46–116)
ALT SERPL W P-5'-P-CCNC: 15 U/L (ref 12–78)
ANION GAP SERPL CALCULATED.3IONS-SCNC: 4 MMOL/L (ref 4–13)
AST SERPL W P-5'-P-CCNC: 12 U/L (ref 5–45)
BASOPHILS # BLD AUTO: 0.05 THOUSANDS/ΜL (ref 0–0.1)
BASOPHILS NFR BLD AUTO: 1 % (ref 0–1)
BILIRUB DIRECT SERPL-MCNC: 0.07 MG/DL (ref 0–0.2)
BILIRUB SERPL-MCNC: 0.23 MG/DL (ref 0.2–1)
BUN SERPL-MCNC: 34 MG/DL (ref 5–25)
CALCIUM ALBUM COR SERPL-MCNC: 9.5 MG/DL (ref 8.3–10.1)
CALCIUM SERPL-MCNC: 9 MG/DL (ref 8.3–10.1)
CHLORIDE SERPL-SCNC: 109 MMOL/L (ref 100–108)
CHOLEST SERPL-MCNC: 217 MG/DL (ref 50–200)
CO2 SERPL-SCNC: 26 MMOL/L (ref 21–32)
CREAT SERPL-MCNC: 2.82 MG/DL (ref 0.6–1.3)
EOSINOPHIL # BLD AUTO: 0.55 THOUSAND/ΜL (ref 0–0.61)
EOSINOPHIL NFR BLD AUTO: 8 % (ref 0–6)
ERYTHROCYTE [DISTWIDTH] IN BLOOD BY AUTOMATED COUNT: 14.6 % (ref 11.6–15.1)
EST. AVERAGE GLUCOSE BLD GHB EST-MCNC: 189 MG/DL
GFR SERPL CREATININE-BSD FRML MDRD: 21 ML/MIN/1.73SQ M
GGT SERPL-CCNC: 18 U/L (ref 5–85)
GLUCOSE P FAST SERPL-MCNC: 145 MG/DL (ref 65–99)
HBA1C MFR BLD: 8.2 %
HCT VFR BLD AUTO: 37.6 % (ref 36.5–49.3)
HDLC SERPL-MCNC: 40 MG/DL
HGB BLD-MCNC: 11.3 G/DL (ref 12–17)
IMM GRANULOCYTES # BLD AUTO: 0.01 THOUSAND/UL (ref 0–0.2)
IMM GRANULOCYTES NFR BLD AUTO: 0 % (ref 0–2)
INR PPP: 1.02 (ref 0.84–1.19)
LDH SERPL-CCNC: 179 U/L (ref 81–234)
LDLC SERPL CALC-MCNC: 136 MG/DL (ref 0–100)
LYMPHOCYTES # BLD AUTO: 2.19 THOUSANDS/ΜL (ref 0.6–4.47)
LYMPHOCYTES NFR BLD AUTO: 31 % (ref 14–44)
MAGNESIUM SERPL-MCNC: 1.8 MG/DL (ref 1.6–2.6)
MCH RBC QN AUTO: 24.1 PG (ref 26.8–34.3)
MCHC RBC AUTO-ENTMCNC: 30.1 G/DL (ref 31.4–37.4)
MCV RBC AUTO: 80 FL (ref 82–98)
MONOCYTES # BLD AUTO: 0.52 THOUSAND/ΜL (ref 0.17–1.22)
MONOCYTES NFR BLD AUTO: 7 % (ref 4–12)
NEUTROPHILS # BLD AUTO: 3.69 THOUSANDS/ΜL (ref 1.85–7.62)
NEUTS SEG NFR BLD AUTO: 53 % (ref 43–75)
NRBC BLD AUTO-RTO: 0 /100 WBCS
PHOSPHATE SERPL-MCNC: 3.4 MG/DL (ref 2.3–4.1)
PLATELET # BLD AUTO: 126 THOUSANDS/UL (ref 149–390)
PMV BLD AUTO: 12.8 FL (ref 8.9–12.7)
POTASSIUM SERPL-SCNC: 5.1 MMOL/L (ref 3.5–5.3)
PROT SERPL-MCNC: 8.1 G/DL (ref 6.4–8.2)
PROTHROMBIN TIME: 13.4 SECONDS (ref 11.6–14.5)
RBC # BLD AUTO: 4.68 MILLION/UL (ref 3.88–5.62)
SODIUM SERPL-SCNC: 139 MMOL/L (ref 136–145)
TACROLIMUS BLD-MCNC: 4 NG/ML (ref 2–20)
TRIGL SERPL-MCNC: 203 MG/DL
TSH SERPL DL<=0.05 MIU/L-ACNC: 3.5 UIU/ML (ref 0.36–3.74)
WBC # BLD AUTO: 7.01 THOUSAND/UL (ref 4.31–10.16)

## 2021-03-29 PROCEDURE — 36415 COLL VENOUS BLD VENIPUNCTURE: CPT

## 2021-03-29 PROCEDURE — 80197 ASSAY OF TACROLIMUS: CPT

## 2021-03-29 PROCEDURE — 84100 ASSAY OF PHOSPHORUS: CPT

## 2021-03-29 PROCEDURE — 80053 COMPREHEN METABOLIC PANEL: CPT

## 2021-03-29 PROCEDURE — 80061 LIPID PANEL: CPT

## 2021-03-29 PROCEDURE — 85610 PROTHROMBIN TIME: CPT

## 2021-03-29 PROCEDURE — 84443 ASSAY THYROID STIM HORMONE: CPT

## 2021-03-29 PROCEDURE — 82977 ASSAY OF GGT: CPT

## 2021-03-29 PROCEDURE — 83615 LACTATE (LD) (LDH) ENZYME: CPT

## 2021-03-29 PROCEDURE — 85025 COMPLETE CBC W/AUTO DIFF WBC: CPT

## 2021-03-29 PROCEDURE — 83036 HEMOGLOBIN GLYCOSYLATED A1C: CPT

## 2021-03-29 PROCEDURE — 82248 BILIRUBIN DIRECT: CPT

## 2021-03-29 PROCEDURE — 83735 ASSAY OF MAGNESIUM: CPT

## 2021-03-30 ENCOUNTER — OFFICE VISIT (OUTPATIENT)
Dept: NEPHROLOGY | Facility: CLINIC | Age: 71
End: 2021-03-30
Payer: MEDICARE

## 2021-03-30 VITALS
HEIGHT: 63 IN | WEIGHT: 132 LBS | SYSTOLIC BLOOD PRESSURE: 122 MMHG | HEART RATE: 70 BPM | DIASTOLIC BLOOD PRESSURE: 78 MMHG | BODY MASS INDEX: 23.39 KG/M2

## 2021-03-30 DIAGNOSIS — N18.9 CHRONIC KIDNEY DISEASE, UNSPECIFIED CKD STAGE: Primary | ICD-10-CM

## 2021-03-30 PROCEDURE — 99214 OFFICE O/P EST MOD 30 MIN: CPT | Performed by: INTERNAL MEDICINE

## 2021-03-30 NOTE — PROGRESS NOTES
NEPHROLOGY PROGRESS NOTE    Ceferino Graham 65 y o  male MRN: [id number]  Unit/Bed#:  Encounter: [id number]  Reason for Consult:  Chronic kidney disease    Patient is here for follow-up states he has been feeling well has had no intercurrent illnesses and reports no changes in medications  Liver is doing well and other than that did receive COVID vaccine we reviewed his medications  ASSESSMENT/PLAN:  1  Renal    Patient's chronic kidney disease likely due to chronic interstitial disease from tacrolimus use as he has had his liver transplant for 21 years  Creatinine has been running around 2 2 the latest was 2 8 that could be subtle volume depletion as his wife says he does not drink or eat that much because he is not really doing much being stuck in the house  At this point he appears well blood pressure is under good control we reviewed his medication tacrolimus level is not significantly elevated so will just continue to monitor  I encouraged good glycemic control  Blood pressure is well controlled so no changes  Continue current medications  Labs and follow-up as scheduled  SUBJECTIVE:  Review of Systems   Constitution: Negative for chills, decreased appetite, fever and malaise/fatigue  HENT: Negative  Eyes: Negative  Cardiovascular: Negative  Negative for chest pain, dyspnea on exertion, leg swelling and orthopnea  Respiratory: Negative  Negative for cough, shortness of breath and wheezing  Gastrointestinal: Negative  Negative for abdominal pain, diarrhea, nausea and vomiting  Genitourinary: Negative for dysuria, flank pain, hematuria and incomplete emptying  Neurological: Negative for dizziness, focal weakness, headaches and weakness  Psychiatric/Behavioral: Negative for altered mental status, depression, hallucinations and hypervigilance         OBJECTIVE:  Current Weight: Weight - Scale: 59 9 kg (132 lb)  [email or web address]:     Blood pressure 122/78, pulse 70, height 5' 3" (1 6 m), weight 59 9 kg (132 lb)  , Body mass index is 23 38 kg/m²  [unfilled]    Physical Exam: /78 (BP Location: Left arm, Patient Position: Sitting, Cuff Size: Standard)   Pulse 70   Ht 5' 3" (1 6 m)   Wt 59 9 kg (132 lb)   BMI 23 38 kg/m²   Physical Exam  Constitutional:       General: He is not in acute distress  Appearance: Normal appearance  He is not ill-appearing or diaphoretic  HENT:      Head: Normocephalic and atraumatic  Nose:      Comments: Mask     Mouth/Throat:      Comments: Mask  Eyes:      General: No scleral icterus  Extraocular Movements: Extraocular movements intact  Neck:      Musculoskeletal: Normal range of motion and neck supple  Cardiovascular:      Rate and Rhythm: Normal rate and regular rhythm  Heart sounds: No friction rub  No gallop  Comments: No edema  Pulmonary:      Effort: Pulmonary effort is normal  No respiratory distress  Breath sounds: Normal breath sounds  No wheezing, rhonchi or rales  Abdominal:      General: Bowel sounds are normal  There is no distension  Palpations: Abdomen is soft  Tenderness: There is no abdominal tenderness  There is no rebound  Neurological:      General: No focal deficit present  Mental Status: He is alert and oriented to person, place, and time  Mental status is at baseline  Psychiatric:         Mood and Affect: Mood normal          Behavior: Behavior normal          Thought Content:  Thought content normal          Judgment: Judgment normal          Medications:    Current Outpatient Medications:     Cetirizine HCl (ZYRTEC ALLERGY) 10 MG CAPS, Take 1 tablet by mouth daily as needed, Disp: , Rfl:     clopidogrel (PLAVIX) 75 mg tablet, TAKE 1 TABLET(75 MG) BY MOUTH DAILY, Disp: 90 tablet, Rfl: 3    glucose blood (GREER CONTOUR TEST) test strip, 1 each by Other route 3 (three) times a day Test, Disp: 300 each, Rfl: 5    Insulin Pen Needle (PEN NEEDLES) 32G X 4 MM MISC, by Does not apply route daily at bedtime, Disp: 100 each, Rfl: 5    INSULIN SYRINGE 1CC/29G 29G X 1/2" 1 ML MISC, by Does not apply route, Disp: , Rfl:     Lancets MISC, by Does not apply route, Disp: , Rfl:     LEVEMIR FLEXTOUCH 100 units/mL injection pen, ADMINISTER 22 UNITS UNDER THE SKIN DAILY AT BEDTIME, Disp: 15 mL, Rfl: 5    Multiple Vitamin tablet, Take by mouth, Disp: , Rfl:     pregabalin (LYRICA) 50 mg capsule, Take 1 capsule (50 mg total) by mouth 3 (three) times a day, Disp: 90 capsule, Rfl: 3    rosuvastatin (CRESTOR) 20 MG tablet, Take 1 tablet (20 mg total) by mouth daily, Disp: 30 tablet, Rfl: 0    tacrolimus (PROGRAF) 1 mg capsule, Take 1 capsule (1 mg total) by mouth every 12 (twelve) hours, Disp: 180 capsule, Rfl: 3    temazepam (RESTORIL) 30 mg capsule, Take 1 capsule (30 mg total) by mouth daily at bedtime, Disp: 30 capsule, Rfl: 3    traMADol (ULTRAM) 50 mg tablet, Take 1 tablet (50 mg total) by mouth every 8 (eight) hours as needed for moderate pain, Disp: 180 tablet, Rfl: 0    triamcinolone (KENALOG) 0 1 % cream, Apply topically 2 (two) times a day, Disp: 80 g, Rfl: 5    zolpidem (AMBIEN) 10 mg tablet, Take 1 tablet (10 mg total) by mouth daily at bedtime, Disp: 30 tablet, Rfl: 3    tamsulosin (FLOMAX) 0 4 mg, TAKE 1 CAPSULE(0 4 MG) BY MOUTH DAILY WITH DINNER (Patient not taking: Reported on 3/30/2021), Disp: 90 capsule, Rfl: 6    Laboratory Results:  Lab Results   Component Value Date    WBC 7 01 03/29/2021    HGB 11 3 (L) 03/29/2021    HCT 37 6 03/29/2021    MCV 80 (L) 03/29/2021     (L) 03/29/2021     Lab Results   Component Value Date    SODIUM 139 03/29/2021    K 5 1 03/29/2021     (H) 03/29/2021    CO2 26 03/29/2021    BUN 34 (H) 03/29/2021    CREATININE 2 82 (H) 03/29/2021    GLUC 92 06/04/2020    CALCIUM 9 0 03/29/2021     Lab Results   Component Value Date    CALCIUM 9 0 03/29/2021    PHOS 3 4 03/29/2021     No results found for: LABPROT

## 2021-03-30 NOTE — PATIENT INSTRUCTIONS
You are here for follow-up you look great sounds like her health has been good with no changes in medications or other problems  Also it great to hear your liver is working great after close to 21 years  The creatinine which is the blood test for the kidney function was a little bit up at 2 8 when it was 2 2  Tacrolimus level is in a good range it is not elevated  It could be from subtle dehydration so make sure you keep herself hydrated will continue to follow but overall things appear stable

## 2021-04-09 DIAGNOSIS — F51.02 ADJUSTMENT INSOMNIA: ICD-10-CM

## 2021-04-09 DIAGNOSIS — E13.40 NEUROPATHY DUE TO SECONDARY DIABETES MELLITUS (HCC): ICD-10-CM

## 2021-04-09 RX ORDER — TRAMADOL HYDROCHLORIDE 50 MG/1
50 TABLET ORAL EVERY 8 HOURS PRN
Qty: 180 TABLET | Refills: 0 | Status: SHIPPED | OUTPATIENT
Start: 2021-04-09 | End: 2021-05-06 | Stop reason: SDUPTHER

## 2021-04-09 NOTE — TELEPHONE ENCOUNTER
Medication:traMADol (ULTRAM) 50 mg tablet       Dosage:50 mg  How Often:Take 1 tablet (50 mg total) by mouth every 8 (eight) hours as needed for moderate pain  Quantity:  180  Last Office Visit: 10/21/20  Next Office Visit:04/21/21  Last refilled:01/07/2021  How many pills left:0  Pharmacy:   [facility], [place] - [place] CAMACHO     [address] [address]  Phone: [phone] Fax: [phone]

## 2021-04-09 NOTE — TELEPHONE ENCOUNTER
Medication: Tramadol 50 mg  PDMP 01/10/2021 1 01/07/2021   TRAMADOL HCL 50 MG TABLET  180 0 60 TI SHE  Active agreement on file -No

## 2021-04-12 DIAGNOSIS — F51.02 ADJUSTMENT INSOMNIA: ICD-10-CM

## 2021-04-12 NOTE — TELEPHONE ENCOUNTER
Medication: zolpidem (AMBIEN      Dosage: 10 mg tablet       How Often: Take 1 tablet (10 mg total) by mouth daily at bedtime  Quantity:  30  Last Office Visit: 10/01/2020  Next Office Visit: 04/21/21  Last refilled: 01/07/21  How many pills left: 2  Pharmacy:   [facility], [place] - [place]   [address] [address]  Phone: [phone] Fax: [phone]

## 2021-04-13 RX ORDER — ZOLPIDEM TARTRATE 10 MG/1
10 TABLET ORAL
Qty: 30 TABLET | Refills: 5 | Status: SHIPPED | OUTPATIENT
Start: 2021-04-13 | End: 2021-08-30 | Stop reason: SDUPTHER

## 2021-04-13 RX ORDER — ZOLPIDEM TARTRATE 10 MG/1
10 TABLET ORAL
Qty: 30 TABLET | Refills: 0 | OUTPATIENT
Start: 2021-04-13

## 2021-04-13 NOTE — TELEPHONE ENCOUNTER
Medication:  PDMP   03/10/2021  1  01/07/2021  ZOLPIDEM TARTRATE 10 MG TABLET  30 0  30  TI SHE        Active agreement on file -No

## 2021-04-21 ENCOUNTER — OFFICE VISIT (OUTPATIENT)
Dept: FAMILY MEDICINE CLINIC | Facility: CLINIC | Age: 71
End: 2021-04-21
Payer: MEDICARE

## 2021-04-21 VITALS
SYSTOLIC BLOOD PRESSURE: 120 MMHG | OXYGEN SATURATION: 97 % | TEMPERATURE: 97.5 F | DIASTOLIC BLOOD PRESSURE: 78 MMHG | HEIGHT: 63 IN | WEIGHT: 132.13 LBS | HEART RATE: 75 BPM | BODY MASS INDEX: 23.41 KG/M2

## 2021-04-21 DIAGNOSIS — E78.5 HYPERLIPIDEMIA, UNSPECIFIED HYPERLIPIDEMIA TYPE: ICD-10-CM

## 2021-04-21 DIAGNOSIS — Z79.4 TYPE 2 DIABETES MELLITUS WITH DIABETIC PERIPHERAL ANGIOPATHY WITHOUT GANGRENE, WITH LONG-TERM CURRENT USE OF INSULIN (HCC): Primary | ICD-10-CM

## 2021-04-21 DIAGNOSIS — E11.22 TYPE 2 DIABETES MELLITUS WITH STAGE 3B CHRONIC KIDNEY DISEASE, WITH LONG-TERM CURRENT USE OF INSULIN (HCC): ICD-10-CM

## 2021-04-21 DIAGNOSIS — N18.32 TYPE 2 DIABETES MELLITUS WITH STAGE 3B CHRONIC KIDNEY DISEASE, WITH LONG-TERM CURRENT USE OF INSULIN (HCC): ICD-10-CM

## 2021-04-21 DIAGNOSIS — Z12.11 SCREENING FOR COLON CANCER: ICD-10-CM

## 2021-04-21 DIAGNOSIS — Z79.4 TYPE 2 DIABETES MELLITUS WITH STAGE 3B CHRONIC KIDNEY DISEASE, WITH LONG-TERM CURRENT USE OF INSULIN (HCC): ICD-10-CM

## 2021-04-21 DIAGNOSIS — Z94.4 LIVER TRANSPLANT STATUS (HCC): ICD-10-CM

## 2021-04-21 DIAGNOSIS — E11.51 TYPE 2 DIABETES MELLITUS WITH DIABETIC PERIPHERAL ANGIOPATHY WITHOUT GANGRENE, WITH LONG-TERM CURRENT USE OF INSULIN (HCC): Primary | ICD-10-CM

## 2021-04-21 DIAGNOSIS — D69.6 THROMBOCYTOPENIA (HCC): ICD-10-CM

## 2021-04-21 PROCEDURE — 99214 OFFICE O/P EST MOD 30 MIN: CPT | Performed by: FAMILY MEDICINE

## 2021-04-21 RX ORDER — ROSUVASTATIN CALCIUM 20 MG/1
20 TABLET, COATED ORAL DAILY
Qty: 30 TABLET | Refills: 5 | Status: SHIPPED | OUTPATIENT
Start: 2021-04-21 | End: 2021-07-11

## 2021-04-21 NOTE — ASSESSMENT & PLAN NOTE
Diet poor  Discussed decrease rice and sweets  Lab Results   Component Value Date    HGBA1C 8 2 (H) 03/29/2021

## 2021-04-21 NOTE — PROGRESS NOTES
Assessment/Plan:    1  Type 2 diabetes mellitus with diabetic peripheral angiopathy without gangrene, with long-term current use of insulin (HCC)  Assessment & Plan:  Diet poor  Discussed decrease rice and sweets  Lab Results   Component Value Date    HGBA1C 8 2 (H) 03/29/2021       Orders:  -     Hemoglobin A1C; Future; Expected date: 08/02/2021    2  Hyperlipidemia, unspecified hyperlipidemia type  Assessment & Plan:  Restart crestor  Lipids high  Did have allergy to lipitor    Orders:  -     rosuvastatin (CRESTOR) 20 MG tablet; Take 1 tablet (20 mg total) by mouth daily    3  Liver transplant status ([facility] [address] )  Assessment & Plan:  Stable       4  Type 2 diabetes mellitus with stage 3b chronic kidney disease, with long-term current use of insulin ([facility])  Assessment & Plan:  Seen by nephrology  Lab Results   Component Value Date    HGBA1C 8 2 (H) 03/29/2021         5  Thrombocytopenia ([facility] [address] )  Assessment & Plan:  Due to liver disease      6  Screening for colon cancer  -     Cologuard; Future          There are no Patient Instructions on file for this visit  Return in about 2 months (around 6/24/2021) for AWV  Subjective:      Patient ID: Vicente Paniagua is a 66 y o  male  Chief Complaint   Patient presents with    Diabetes       Here with wife for follow up  Did have covid vaaccine at [address] [place] [address] Dr Neely  He presents for his follow-up diabetic visit  He has type 2 diabetes mellitus  His disease course has been worsening  There are no hypoglycemic associated symptoms  There are no diabetic associated symptoms  There are no hypoglycemic complications  Symptoms are stable  There are no diabetic complications  Risk factors for coronary artery disease include diabetes mellitus and dyslipidemia  Current diabetic treatment includes insulin injections  He is compliant with treatment all of the time  His weight is stable  He is following a diabetic diet  He has not had a previous visit with a dietitian   He rarely participates in exercise  There is no change in his home blood glucose trend  An ACE inhibitor/angiotensin II receptor blocker is not being taken  He does not see a podiatrist Eye exam is current  Hyperlipidemia  This is a chronic problem  The current episode started more than 1 year ago  The problem is uncontrolled  Recent lipid tests were reviewed and are high  There are no known factors aggravating his hyperlipidemia  Treatments tried: not current taking  The current treatment provides no improvement of lipids  There are no compliance problems  The following portions of the patient's history were reviewed and updated as appropriate: allergies, current medications, past family history, past medical history, past social history, past surgical history and problem list     Review of Systems   Constitutional: Negative  HENT: Negative  Eyes: Negative  Respiratory: Negative  Cardiovascular: Negative  Gastrointestinal: Negative  Endocrine: Negative  Genitourinary: Negative  Musculoskeletal: Negative  Skin: Negative  Allergic/Immunologic: Negative  Neurological: Negative  Hematological: Negative  Psychiatric/Behavioral: Positive for sleep disturbance           Current Outpatient Medications   Medication Sig Dispense Refill    Cetirizine HCl (ZYRTEC ALLERGY) 10 MG CAPS Take 1 tablet by mouth daily as needed      clopidogrel (PLAVIX) 75 mg tablet TAKE 1 TABLET(75 MG) BY MOUTH DAILY 90 tablet 3    glucose blood (GREER CONTOUR TEST) test strip 1 each by Other route 3 (three) times a day Test 300 each 5    Insulin Pen Needle (PEN NEEDLES) 32G X 4 MM MISC by Does not apply route daily at bedtime 100 each 5    INSULIN SYRINGE 1CC/29G 29G X 1/2" 1 ML MISC by Does not apply route      Lancets MISC by Does not apply route      LEVEMIR FLEXTOUCH 100 units/mL injection pen ADMINISTER 22 UNITS UNDER THE SKIN DAILY AT BEDTIME 15 mL 5    Multiple Vitamin tablet Take by mouth      pregabalin (LYRICA) 50 mg capsule Take 1 capsule (50 mg total) by mouth 3 (three) times a day 90 capsule 3    rosuvastatin (CRESTOR) 20 MG tablet Take 1 tablet (20 mg total) by mouth daily 30 tablet 5    tacrolimus (PROGRAF) 1 mg capsule Take 1 capsule (1 mg total) by mouth every 12 (twelve) hours 180 capsule 3    temazepam (RESTORIL) 30 mg capsule Take 1 capsule (30 mg total) by mouth daily at bedtime 30 capsule 3    traMADol (ULTRAM) 50 mg tablet Take 1 tablet (50 mg total) by mouth every 8 (eight) hours as needed for moderate pain 180 tablet 0    triamcinolone (KENALOG) 0 1 % cream Apply topically 2 (two) times a day 80 g 5    zolpidem (AMBIEN) 10 mg tablet Take 1 tablet (10 mg total) by mouth daily at bedtime 30 tablet 5    tamsulosin (FLOMAX) 0 4 mg TAKE 1 CAPSULE(0 4 MG) BY MOUTH DAILY WITH DINNER (Patient not taking: Reported on 3/30/2021) 90 capsule 6     No current facility-administered medications for this visit  Objective:    /78 (BP Location: Right arm, Patient Position: Sitting, Cuff Size: Adult)   Pulse 75   Temp 97 5 °F (36 4 °C) (Tympanic)   Ht 5' 3" (1 6 m)   Wt 59 9 kg (132 lb 2 oz)   SpO2 97%   BMI 23 40 kg/m²        Physical Exam  Vitals signs and nursing note reviewed  Constitutional:       Appearance: Normal appearance  HENT:      Head: Normocephalic and atraumatic  Eyes:      Extraocular Movements: Extraocular movements intact  Pupils: Pupils are equal, round, and reactive to light  Neck:      Musculoskeletal: Normal range of motion and neck supple  Cardiovascular:      Rate and Rhythm: Normal rate and regular rhythm  Pulses: Normal pulses  no weak pulses          Dorsalis pedis pulses are 2+ on the right side and 2+ on the left side  Posterior tibial pulses are 2+ on the right side and 2+ on the left side  Heart sounds: Normal heart sounds  Pulmonary:      Effort: Pulmonary effort is normal       Breath sounds: Normal breath sounds  Abdominal:      General: Abdomen is flat  Palpations: Abdomen is soft  Musculoskeletal: Normal range of motion  Feet:      Right foot:      Skin integrity: No ulcer, skin breakdown, erythema, warmth, callus or dry skin  Left foot:      Skin integrity: No ulcer, skin breakdown, erythema, warmth, callus or dry skin  Skin:     General: Skin is warm  Capillary Refill: Capillary refill takes less than 2 seconds  Neurological:      General: No focal deficit present  Mental Status: He is alert and oriented to person, place, and time  Psychiatric:         Mood and Affect: Mood normal          Behavior: Behavior normal          Thought Content: Thought content normal          Judgment: Judgment normal            Patient's shoes and socks removed  Right Foot/Ankle   Right Foot Inspection  Skin Exam: skin normal and skin intact no dry skin, no warmth, no callus, no erythema, no maceration, no abnormal color, no pre-ulcer, no ulcer and no callus                          Toe Exam: ROM and strength within normal limits  Sensory     Proprioception: intact   Monofilament testing: intact  Vascular  Capillary refills: < 3 seconds  The right DP pulse is 2+  The right PT pulse is 2+  Left Foot/Ankle  Left Foot Inspection  Skin Exam: skin normal and skin intactno dry skin, no warmth, no erythema, no maceration, normal color, no pre-ulcer, no ulcer and no callus                         Toe Exam: ROM and strength within normal limits                   Sensory     Proprioception: intact  Monofilament: intact  Vascular  Capillary refills: < 3 seconds  The left DP pulse is 2+  The left PT pulse is 2+  Assign Risk Category:  No deformity present; No loss of protective sensation;  No weak pulses       Risk: 0       Mis Morale, DO

## 2021-05-06 DIAGNOSIS — G62.9 NEUROPATHY: ICD-10-CM

## 2021-05-06 DIAGNOSIS — E13.40 NEUROPATHY DUE TO SECONDARY DIABETES MELLITUS (HCC): ICD-10-CM

## 2021-05-06 DIAGNOSIS — R21 RASH: ICD-10-CM

## 2021-05-06 DIAGNOSIS — Z94.4 LIVER TRANSPLANTED (HCC): ICD-10-CM

## 2021-05-06 DIAGNOSIS — I63.9 LEFT THALAMIC INFARCTION (HCC): ICD-10-CM

## 2021-05-06 DIAGNOSIS — E78.5 HYPERLIPIDEMIA, UNSPECIFIED HYPERLIPIDEMIA TYPE: ICD-10-CM

## 2021-05-06 DIAGNOSIS — F51.02 ADJUSTMENT INSOMNIA: ICD-10-CM

## 2021-05-06 DIAGNOSIS — G47.00 INSOMNIA, UNSPECIFIED TYPE: ICD-10-CM

## 2021-05-06 RX ORDER — TRIAMCINOLONE ACETONIDE 1 MG/G
CREAM TOPICAL 2 TIMES DAILY
Qty: 80 G | Refills: 5 | Status: SHIPPED | OUTPATIENT
Start: 2021-05-06 | End: 2021-08-30 | Stop reason: SDUPTHER

## 2021-05-06 RX ORDER — TEMAZEPAM 30 MG/1
30 CAPSULE ORAL
Qty: 30 CAPSULE | Refills: 3 | Status: SHIPPED | OUTPATIENT
Start: 2021-05-06 | End: 2021-08-30 | Stop reason: SDUPTHER

## 2021-05-06 RX ORDER — PREGABALIN 50 MG/1
50 CAPSULE ORAL 3 TIMES DAILY
Qty: 90 CAPSULE | Refills: 3 | Status: SHIPPED | OUTPATIENT
Start: 2021-05-06 | End: 2021-08-30 | Stop reason: SDUPTHER

## 2021-05-06 RX ORDER — TRAMADOL HYDROCHLORIDE 50 MG/1
50 TABLET ORAL EVERY 8 HOURS PRN
Qty: 180 TABLET | Refills: 0 | Status: SHIPPED | OUTPATIENT
Start: 2021-05-06 | End: 2021-06-28 | Stop reason: SDUPTHER

## 2021-05-06 RX ORDER — TACROLIMUS 1 MG/1
1 CAPSULE ORAL EVERY 12 HOURS
Qty: 180 CAPSULE | Refills: 3 | Status: SHIPPED | OUTPATIENT
Start: 2021-05-06 | End: 2021-08-30 | Stop reason: SDUPTHER

## 2021-05-06 RX ORDER — CLOPIDOGREL BISULFATE 75 MG/1
75 TABLET ORAL DAILY
Qty: 90 TABLET | Refills: 3 | Status: SHIPPED | OUTPATIENT
Start: 2021-05-06 | End: 2021-08-30 | Stop reason: SDUPTHER

## 2021-05-13 ENCOUNTER — OFFICE VISIT (OUTPATIENT)
Dept: NEUROLOGY | Facility: CLINIC | Age: 71
End: 2021-05-13
Payer: MEDICARE

## 2021-05-13 VITALS
HEIGHT: 61 IN | HEART RATE: 69 BPM | DIASTOLIC BLOOD PRESSURE: 90 MMHG | BODY MASS INDEX: 25.32 KG/M2 | SYSTOLIC BLOOD PRESSURE: 138 MMHG | WEIGHT: 134.1 LBS

## 2021-05-13 DIAGNOSIS — N28.9 RENAL INSUFFICIENCY: ICD-10-CM

## 2021-05-13 DIAGNOSIS — G45.0 VERTEBROBASILAR ARTERY INSUFFICIENCY: ICD-10-CM

## 2021-05-13 DIAGNOSIS — K70.30 ALCOHOLIC CIRRHOSIS, UNSPECIFIED WHETHER ASCITES PRESENT (HCC): ICD-10-CM

## 2021-05-13 DIAGNOSIS — I63.442 CEREBROVASCULAR ACCIDENT (CVA) DUE TO EMBOLISM OF LEFT CEREBELLAR ARTERY (HCC): ICD-10-CM

## 2021-05-13 DIAGNOSIS — Z79.4 TYPE 2 DIABETES MELLITUS WITH DIABETIC PERIPHERAL ANGIOPATHY WITHOUT GANGRENE, WITH LONG-TERM CURRENT USE OF INSULIN (HCC): ICD-10-CM

## 2021-05-13 DIAGNOSIS — G31.84 AMNESTIC MCI (MILD COGNITIVE IMPAIRMENT WITH MEMORY LOSS): ICD-10-CM

## 2021-05-13 DIAGNOSIS — Z94.4 LIVER TRANSPLANT STATUS (HCC): ICD-10-CM

## 2021-05-13 DIAGNOSIS — E11.51 TYPE 2 DIABETES MELLITUS WITH DIABETIC PERIPHERAL ANGIOPATHY WITHOUT GANGRENE, WITH LONG-TERM CURRENT USE OF INSULIN (HCC): ICD-10-CM

## 2021-05-13 DIAGNOSIS — I63.9 LEFT THALAMIC INFARCTION (HCC): Primary | ICD-10-CM

## 2021-05-13 DIAGNOSIS — G62.9 PERIPHERAL POLYNEUROPATHY: ICD-10-CM

## 2021-05-13 PROCEDURE — 99215 OFFICE O/P EST HI 40 MIN: CPT | Performed by: PSYCHIATRY & NEUROLOGY

## 2021-05-13 NOTE — PROGRESS NOTES
[facility] MULTIPLE SCLEROSIS CENTER  PATIENT:  Hannah Duarte  MRN:  [id number]  :  1950  DATE OF SERVICE:  2021  Assessment/Plan:           Problem List Items Addressed This Visit        Digestive    Liver transplant status ([facility] )    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[facility] )    Relevant Orders    MRI brain NeuroQuant wo contrast    Ambulatory referral to Speech Therapy       Endocrine    Type 2 diabetes mellitus with diabetic peripheral angiopathy without gangrene, with long-term current use of insulin (HCC)       Nervous and Auditory    Peripheral neuropathy    Relevant Orders    Ambulatory referral to Speech Therapy    MRI angiogram head without contrast    Left thalamic infarction ([facility] ) - Primary    Relevant Orders    MRI brain NeuroQuant wo contrast    Ambulatory referral to Speech Therapy    Amnestic MCI (mild cognitive impairment with memory loss)    Relevant Orders    MRI brain NeuroQuant wo contrast    Ambulatory referral to Speech Therapy    MRI angiogram head without contrast       Genitourinary    Renal insufficiency      Other Visit Diagnoses     Cerebrovascular accident (CVA) due to embolism of left cerebellar artery ([facility] [address] )        Relevant Orders    MRI brain NeuroQuant wo contrast    Ambulatory referral to Speech Therapy    MRI angiogram head without contrast    Vertebrobasilar artery insufficiency        Relevant Orders    MRI angiogram head without contrast          Mr Lor Batista was referred to  [address] [place] [address] for evaluation of cognitive dysfunction and dizziness:   - patient presented today at [facility] multiple [address] for follow-up on multiple strokes with last office visit by stroke team was in   Patient was found to have left thalamic and left occipital stroke, with Plavix was recommended during his last office visit with Neurology    Patient presented today with his daughter, as per discussion, Plavix 75 mg is no longer part of his medical management as per daughter, despite refill has been provided recently- patient is to continue Plavix 75 mg on daily basis  - patient described intermittent events of room spinning sensation lasting for the week, and triggered by changing position, no nausea vomiting has been described, no signs of BPPV appreciated on today's exam- we agreed patient will benefit from considering brain imaging as well as repeating MRA head, with concern for another ischemic changes in the setting of intracranial atherosclerotic changes in PCA described 3 years ago  Vertebrobasilar insufficiency will be evaluated with further imaging in might be contributing to patient complains of dizziness  - today MOCA score was 9/30 with 0/5 words recalled, we extensively discussed this findings as patient cognitive dysfunction is likely multifactorial, including English is not patient's primary language, with remote history of alcohol consumption as well as multiple strokes in brain parenchyma including thalamus  Patient will be advised against initiating Aricept or other acetylcholine esterase inhibitors  Patient received referral to follow with cognitive therapy  Patient is to follow with Augusto Brito within 2 months  Subjective: short  memory issue, vertigo amd confusion  Reason for consult : Dizziness [R42]  - Primary     HPI  Mr Angel Dobson is a 75-year-old male who presented to  [address] [place] [address] for evaluation  Patient has left thalamic stroke, diabetes, subdural hygroma, peripheral neuropathy, alcoholic cirrhosis s/p liver transplant on chronic immunosupression, chronic kidney disease  LOV with Augusto Brito and Robby Blackwell ( stroke team)  short  memory issue, vertigo and confusion  MRI brain 12/2018: Acute to early subacute punctate infarction in the left thalamus  Chronic left PCA territory infarction and redemonstrated mild to moderate cerebral white matter microangiopathic changes    Prior left frontal temporal craniotomy and left paraclinoid aneurysm clipping  Patient was described to have cognitive dysfunction and confusional states, with no focal weakness or sensory loss, no dysphagia, aphagia , no double vision or vision loss; Patient presented with his daughter, most of the history is taken from his daughter     The following portions of the patient's history were reviewed and updated as appropriate: He  has a past medical history of Alcoholism ([facility] ), Cerebral artery aneurysm, Change in mental state, Diabetes mellitus ([facility] ), Drug dependence ([facility] ), Fatigue, Hepatitis C, Hospital discharge follow-up (12/21/2018), Kidney disease, 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[facility] ), Liver transplant recipient [facility]), Renal disorder, Subdural hygroma, and Thrombocytopenia ([facility] ) (9/20/2017)    He   Patient Active Problem List    Diagnosis Date Noted    Amnestic MCI (mild cognitive impairment with memory loss) 05/13/2021    Subdural hygroma     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HCC)     CKD (chronic kidney disease) 03/30/2021    Hand lesion 06/23/2020    Type 2 diabetes mellitus with chronic kidney disease ([facility] ) 10/14/2019    Type 2 diabetes mellitus with diabetic peripheral angiopathy without gangrene, with long-term current use of insulin ([facility] ) 06/13/2019    Hyperlipidemia 01/24/2019    Renal insufficiency 01/24/2019    Left thalamic infarction ([facility] ) 12/17/2018    Personal history of transient ischemic attack (TIA), and cerebral infarction without residual deficits 12/03/2018    Rash 09/11/2018    Medicare annual wellness visit, subsequent 06/07/2018    Screening for colon cancer 06/07/2018    Hepatitis B core antibody positive 09/25/2017    Anemia due to stage 3 chronic kidney disease ([address] ) 09/20/2017    Thrombocytopenia ([facility] ) 09/20/2017    History of CVA (cerebrovascular accident) 09/18/2017    Controlled diabetes mellitus with diabetic neuropathy, with long-term current use of insulin ([facility] ) 09/18/2017    Liver transplant status ([facility] ) 09/18/2017    History of immunosuppression therapy 09/18/2017    History of hepatitis C 09/18/2017    Pruritus 08/03/2016    Spondylosis of cervical region without myelopathy or radiculopathy 02/02/2016    Headache, chronic daily 09/21/2015    Vitamin D deficiency 12/16/2014    Occipital neuralgia 04/08/2014    Migraine headache 03/31/2014    Vertigo 12/31/2013    Cerebral arterial aneurysm 12/02/2013    Prurigo nodularis 10/15/2013    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    Erectile dysfunction of non-organic origin 09/11/2013    History of cirrhosis 07/09/2012    Insomnia 05/30/2012    Peripheral neuropathy 05/07/2012     He  has a past surgical history that includes Liver transplantation; Brain surgery (02/12/2014); Cataract extraction; Cholecystectomy; Rotator cuff repair; Shoulder surgery; IR PICC line (12/14/2018); and FL lumbar puncture diagnostic (12/14/2018)  His family history includes Cancer in his brother; Diabetes in his sister; Hypertension in his mother; Lung cancer in his father; Stroke in his other  He  reports that he has never smoked  He has never used smokeless tobacco  He reports that he does not drink alcohol or use drugs    Current Outpatient Medications   Medication Sig Dispense Refill    Cetirizine HCl (ZYRTEC ALLERGY) 10 MG CAPS Take 1 tablet by mouth daily as needed      glucose blood (GREER CONTOUR TEST) test strip 1 each by Other route 3 (three) times a day Test 300 each 5    Insulin Pen Needle (PEN NEEDLES) 32G X 4 MM MISC by Does not apply route daily at bedtime 100 each 5    INSULIN SYRINGE 1CC/29G 29G X 1/2" 1 ML MISC by Does not apply route      Lancets MISC by Does not apply route      LEVEMIR FLEXTOUCH 100 units/mL injection pen ADMINISTER 22 UNITS UNDER THE SKIN DAILY AT BEDTIME 15 mL 5    pregabalin (LYRICA) 50 mg capsule Take 1 capsule (50 mg total) by mouth 3 (three) times a day 90 capsule 3    tacrolimus (PROGRAF) 1 mg capsule Take 1 capsule (1 mg total) by mouth every 12 (twelve) hours 180 capsule 3    temazepam (RESTORIL) 30 mg capsule Take 1 capsule (30 mg total) by mouth daily at bedtime 30 capsule 3    traMADol (ULTRAM) 50 mg tablet Take 1 tablet (50 mg total) by mouth every 8 (eight) hours as needed for moderate pain 180 tablet 0    zolpidem (AMBIEN) 10 mg tablet Take 1 tablet (10 mg total) by mouth daily at bedtime 30 tablet 5    clopidogrel (PLAVIX) 75 mg tablet Take 1 tablet (75 mg total) by mouth daily (Patient not taking: Reported on 5/13/2021) 90 tablet 3    Multiple Vitamin tablet Take by mouth      rosuvastatin (CRESTOR) 20 MG tablet Take 1 tablet (20 mg total) by mouth daily (Patient not taking: Reported on 5/13/2021) 30 tablet 5    tamsulosin (FLOMAX) 0 4 mg TAKE 1 CAPSULE(0 4 MG) BY MOUTH DAILY WITH DINNER (Patient not taking: Reported on 3/30/2021) 90 capsule 6    triamcinolone (KENALOG) 0 1 % cream Apply topically 2 (two) times a day (Patient not taking: Reported on 5/13/2021) 80 g 5     No current facility-administered medications for this visit        Current Outpatient Medications on File Prior to Visit   Medication Sig    Cetirizine HCl (ZYRTEC ALLERGY) 10 MG CAPS Take 1 tablet by mouth daily as needed    glucose blood (GREER CONTOUR TEST) test strip 1 each by Other route 3 (three) times a day Test    Insulin Pen Needle (PEN NEEDLES) 32G X 4 MM MISC by Does not apply route daily at bedtime    INSULIN SYRINGE 1CC/29G 29G X 1/2" 1 ML MISC by Does not apply route    Lancets MISC by Does not apply route    LEVEMIR FLEXTOUCH 100 units/mL injection pen ADMINISTER 22 UNITS UNDER THE SKIN DAILY AT BEDTIME    pregabalin (LYRICA) 50 mg capsule Take 1 capsule (50 mg total) by mouth 3 (three) times a day    tacrolimus (PROGRAF) 1 mg capsule Take 1 capsule (1 mg total) by mouth every 12 (twelve) hours    temazepam (RESTORIL) 30 mg capsule Take 1 capsule (30 mg total) by mouth daily at bedtime    traMADol (ULTRAM) 50 mg tablet Take 1 tablet (50 mg total) by mouth every 8 (eight) hours as needed for moderate pain    zolpidem (AMBIEN) 10 mg tablet Take 1 tablet (10 mg total) by mouth daily at bedtime    clopidogrel (PLAVIX) 75 mg tablet Take 1 tablet (75 mg total) by mouth daily (Patient not taking: Reported on 5/13/2021)    Multiple Vitamin tablet Take by mouth    rosuvastatin (CRESTOR) 20 MG tablet Take 1 tablet (20 mg total) by mouth daily (Patient not taking: Reported on 5/13/2021)    tamsulosin (FLOMAX) 0 4 mg TAKE 1 CAPSULE(0 4 MG) BY MOUTH DAILY WITH DINNER (Patient not taking: Reported on 3/30/2021)    triamcinolone (KENALOG) 0 1 % cream Apply topically 2 (two) times a day (Patient not taking: Reported on 5/13/2021)     No current facility-administered medications on file prior to visit  He is allergic to tequin [gatifloxacin]; ciprofloxacin; iv contrast [iodinated diagnostic agents]; levofloxacin; lipitor [atorvastatin]; and omnipaque [iohexol]            Objective:    Blood pressure 138/90, pulse 69, height 5' 1" (1 549 m), weight 60 8 kg (134 lb 1 6 oz)  Physical Exam    Neurological Exam  CONSTITUTIONAL: NAD, pleasant  NECK: supple, no lymphadenopathy, no thyromegaly, no JVD  CARDIOVASCULAR: RRR, normal S1S2, no murmurs, no rubs  RESP: clear to auscultation bilaterally, no wheezes/rhonchi/rales  ABDOMEN: soft, non tender, non distended  SKIN: no rash or skin lesions  EXTREMITIES: no edema, pulses 2+bilaterally  PSYCH: appropriate mood and affect  NEUROLOGIC COMPREHENSIVE EXAM: Patient is oriented to person, place and time, NAD; appropriate affect  CN II, III, IV, V, VI, VII,VIII,IX,X,XI-XII intact with EOMI, PERRLA, OKN intact, VF grossly intact, fundi poorly visualized secondary to pupillary constriction; symmetric face noted   Motor: 5/5 UE/LE bilateral symmetric; Sensory: intact to light touch and pinprick bilaterally; normal vibration sensation feet bilaterally; Coordination within normal limits on FTN and IBAN testing; DTR: 2/4 through, no Babinski, no clonus  Tandem gait is intact  Romberg: negative  ROS:  12 points of review of system was reviewed with the patient and was unremarkable with exception: see HPI  Review of Systems   Constitutional: Negative  Negative for appetite change and fever  HENT: Positive for hearing loss  Negative for tinnitus, trouble swallowing and voice change  Eyes: Negative  Negative for photophobia and pain  Respiratory: Negative  Negative for shortness of breath  Cardiovascular: Negative  Negative for palpitations  Gastrointestinal: Negative  Negative for nausea and vomiting  Endocrine: Negative  Negative for cold intolerance  Genitourinary: Positive for urgency  Negative for dysuria and frequency  Erection difficulties   Musculoskeletal: Positive for gait problem (balance problems )  Negative for myalgias and neck pain  Difficulty walking   Skin: Negative  Negative for rash  Allergic/Immunologic: Negative  Neurological: Positive for dizziness (vertigo)  Negative for tremors, seizures, syncope, facial asymmetry, speech difficulty, weakness, light-headedness, numbness and headaches  Short term memory issues   Hematological: Bruises/bleeds easily  Psychiatric/Behavioral: Positive for confusion  Negative for hallucinations and sleep disturbance

## 2021-05-23 ENCOUNTER — APPOINTMENT (EMERGENCY)
Dept: RADIOLOGY | Facility: HOSPITAL | Age: 71
End: 2021-05-23
Payer: MEDICARE

## 2021-05-23 ENCOUNTER — HOSPITAL ENCOUNTER (EMERGENCY)
Facility: HOSPITAL | Age: 71
Discharge: HOME | End: 2021-05-23
Attending: SURGERY | Admitting: SURGERY
Payer: MEDICARE

## 2021-05-23 VITALS
BODY MASS INDEX: 19.41 KG/M2 | HEART RATE: 62 BPM | WEIGHT: 102.73 LBS | DIASTOLIC BLOOD PRESSURE: 79 MMHG | RESPIRATION RATE: 16 BRPM | OXYGEN SATURATION: 99 % | SYSTOLIC BLOOD PRESSURE: 156 MMHG | TEMPERATURE: 97.6 F

## 2021-05-23 DIAGNOSIS — S22.41XA CLOSED FRACTURE OF MULTIPLE RIBS OF RIGHT SIDE, INITIAL ENCOUNTER: Primary | ICD-10-CM
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
## 2021-05-23 LAB
ALBUMIN SERPL BCP-MCNC: 3.3 G/DL (ref 3.5–5)
ALP SERPL-CCNC: 82 U/L (ref 46–116)
ALT SERPL W P-5'-P-CCNC: 18 U/L (ref 12–78)
ANION GAP SERPL CALCULATED.3IONS-SCNC: 5 MMOL/L (ref 4–13)
AST SERPL W P-5'-P-CCNC: 23 U/L (ref 5–45)
BASE EXCESS BLDA CALC-SCNC: -1 MMOL/L (ref -2–3)
BASOPHILS # BLD AUTO: 0.05 THOUSANDS/ΜL (ref 0–0.1)
BASOPHILS NFR BLD AUTO: 1 % (ref 0–1)
BILIRUB SERPL-MCNC: 0.28 MG/DL (ref 0.2–1)
BUN SERPL-MCNC: 33 MG/DL (ref 5–25)
CALCIUM ALBUM COR SERPL-MCNC: 9.3 MG/DL (ref 8.3–10.1)
CALCIUM SERPL-MCNC: 8.7 MG/DL (ref 8.3–10.1)
CHLORIDE SERPL-SCNC: 112 MMOL/L (ref 100–108)
CO2 SERPL-SCNC: 23 MMOL/L (ref 21–32)
CREAT SERPL-MCNC: 2.85 MG/DL (ref 0.6–1.3)
EOSINOPHIL # BLD AUTO: 0.61 THOUSAND/ΜL (ref 0–0.61)
EOSINOPHIL NFR BLD AUTO: 8 % (ref 0–6)
ERYTHROCYTE [DISTWIDTH] IN BLOOD BY AUTOMATED COUNT: 14 % (ref 11.6–15.1)
GFR SERPL CREATININE-BSD FRML MDRD: 21 ML/MIN/1.73SQ M
GLUCOSE SERPL-MCNC: 145 MG/DL (ref 65–140)
GLUCOSE SERPL-MCNC: 147 MG/DL (ref 65–140)
HCO3 BLDA-SCNC: 22.8 MMOL/L (ref 24–30)
HCT VFR BLD AUTO: 35.6 % (ref 36.5–49.3)
HCT VFR BLD CALC: 33 % (ref 36.5–49.3)
HGB BLD-MCNC: 10.6 G/DL (ref 12–17)
HGB BLDA-MCNC: 11.2 G/DL (ref 12–17)
HOLD SPECIMEN: NORMAL
IMM GRANULOCYTES # BLD AUTO: 0.02 THOUSAND/UL (ref 0–0.2)
IMM GRANULOCYTES NFR BLD AUTO: 0 % (ref 0–2)
INR PPP: 1.09 (ref 0.84–1.19)
LYMPHOCYTES # BLD AUTO: 2.05 THOUSANDS/ΜL (ref 0.6–4.47)
LYMPHOCYTES NFR BLD AUTO: 26 % (ref 14–44)
MCH RBC QN AUTO: 24.1 PG (ref 26.8–34.3)
MCHC RBC AUTO-ENTMCNC: 29.8 G/DL (ref 31.4–37.4)
MCV RBC AUTO: 81 FL (ref 82–98)
MONOCYTES # BLD AUTO: 0.56 THOUSAND/ΜL (ref 0.17–1.22)
MONOCYTES NFR BLD AUTO: 7 % (ref 4–12)
NEUTROPHILS # BLD AUTO: 4.52 THOUSANDS/ΜL (ref 1.85–7.62)
NEUTS SEG NFR BLD AUTO: 58 % (ref 43–75)
NRBC BLD AUTO-RTO: 0 /100 WBCS
PCO2 BLD: 24 MMOL/L (ref 21–32)
PCO2 BLD: 34.3 MM HG (ref 42–50)
PH BLD: 7.43 [PH] (ref 7.3–7.4)
PLATELET # BLD AUTO: 97 THOUSANDS/UL (ref 149–390)
PMV BLD AUTO: 12.8 FL (ref 8.9–12.7)
PO2 BLD: 29 MM HG (ref 35–45)
POTASSIUM BLD-SCNC: 4.9 MMOL/L (ref 3.5–5.3)
POTASSIUM SERPL-SCNC: 5 MMOL/L (ref 3.5–5.3)
PROT SERPL-MCNC: 7.6 G/DL (ref 6.4–8.2)
PROTHROMBIN TIME: 14.1 SECONDS (ref 11.6–14.5)
RBC # BLD AUTO: 4.39 MILLION/UL (ref 3.88–5.62)
SAO2 % BLD FROM PO2: 59 % (ref 60–85)
SODIUM BLD-SCNC: 140 MMOL/L (ref 136–145)
SODIUM SERPL-SCNC: 140 MMOL/L (ref 136–145)
SPECIMEN SOURCE: ABNORMAL
WBC # BLD AUTO: 7.81 THOUSAND/UL (ref 4.31–10.16)

## 2021-05-23 PROCEDURE — 84295 ASSAY OF SERUM SODIUM: CPT

## 2021-05-23 PROCEDURE — 72170 X-RAY EXAM OF PELVIS: CPT

## 2021-05-23 PROCEDURE — 82947 ASSAY GLUCOSE BLOOD QUANT: CPT

## 2021-05-23 PROCEDURE — 99284 EMERGENCY DEPT VISIT MOD MDM: CPT | Performed by: SURGERY

## 2021-05-23 PROCEDURE — 36415 COLL VENOUS BLD VENIPUNCTURE: CPT | Performed by: SURGERY

## 2021-05-23 PROCEDURE — 72125 CT NECK SPINE W/O DYE: CPT

## 2021-05-23 PROCEDURE — 74176 CT ABD & PELVIS W/O CONTRAST: CPT

## 2021-05-23 PROCEDURE — 85014 HEMATOCRIT: CPT

## 2021-05-23 PROCEDURE — 70450 CT HEAD/BRAIN W/O DYE: CPT

## 2021-05-23 PROCEDURE — 80053 COMPREHEN METABOLIC PANEL: CPT | Performed by: SURGERY

## 2021-05-23 PROCEDURE — 85025 COMPLETE CBC W/AUTO DIFF WBC: CPT | Performed by: SURGERY

## 2021-05-23 PROCEDURE — 84132 ASSAY OF SERUM POTASSIUM: CPT

## 2021-05-23 PROCEDURE — 85610 PROTHROMBIN TIME: CPT | Performed by: SURGERY

## 2021-05-23 PROCEDURE — G1004 CDSM NDSC: HCPCS

## 2021-05-23 PROCEDURE — 71250 CT THORAX DX C-: CPT

## 2021-05-23 PROCEDURE — 99284 EMERGENCY DEPT VISIT MOD MDM: CPT

## 2021-05-23 PROCEDURE — 82803 BLOOD GASES ANY COMBINATION: CPT

## 2021-05-23 PROCEDURE — 71045 X-RAY EXAM CHEST 1 VIEW: CPT

## 2021-05-23 RX ORDER — ACETAMINOPHEN 500 MG
500 TABLET ORAL EVERY 6 HOURS PRN
Qty: 30 TABLET | Refills: 0 | Status: SHIPPED | OUTPATIENT
Start: 2021-05-23

## 2021-05-23 RX ORDER — METHOCARBAMOL 500 MG/1
500 TABLET, FILM COATED ORAL EVERY 6 HOURS PRN
Status: DISCONTINUED | OUTPATIENT
Start: 2021-05-23 | End: 2021-05-23 | Stop reason: HOSPADM

## 2021-05-23 RX ORDER — LIDOCAINE 50 MG/G
1 PATCH TOPICAL DAILY
Status: DISCONTINUED | OUTPATIENT
Start: 2021-05-23 | End: 2021-05-23 | Stop reason: HOSPADM

## 2021-05-23 RX ORDER — ACETAMINOPHEN 325 MG/1
650 TABLET ORAL EVERY 4 HOURS PRN
Status: DISCONTINUED | OUTPATIENT
Start: 2021-05-23 | End: 2021-05-23 | Stop reason: HOSPADM

## 2021-05-23 RX ORDER — METHOCARBAMOL 500 MG/1
500 TABLET, FILM COATED ORAL 4 TIMES DAILY
Qty: 28 TABLET | Refills: 0 | Status: SHIPPED | OUTPATIENT
Start: 2021-05-23 | End: 2021-11-02

## 2021-05-23 RX ADMIN — LIDOCAINE 1 PATCH: 50 PATCH TOPICAL at 10:30

## 2021-05-23 RX ADMIN — METHOCARBAMOL 500 MG: 500 TABLET, FILM COATED ORAL at 10:30

## 2021-05-23 RX ADMIN — ACETAMINOPHEN 650 MG: 325 TABLET, FILM COATED ORAL at 10:30

## 2021-05-23 NOTE — INCIDENTAL FINDINGS
The following findings require follow up:  Radiographic finding   Finding: Development of right-sided hydronephrosis of unclear etiology  However, there may be subtle right posterior bladder wall thickening  Nonemergent urological consultation is recommended      Follow up required: PCP   Follow up should be done within 2  week(s)    Please notify the following clinician to assist with the follow up:   Dr Heraclio Arzate

## 2021-05-23 NOTE — PROGRESS NOTES
H&P Exam - Trauma   Deb Loya 66 y o  male MRN: [id number]  Unit/Bed#: ED 25 Encounter: [id number]    Assessment/Plan   Trauma Alert: Level B  Model of Arrival: Ambulance  Trauma Team: Attending Sonia Singleton and Residents [address] [place] [address]  Consultants: None    Trauma Active Problems: R chest pain    Trauma Plan: CTH, C-spine, C/A/P, basic labs  2 rib fractures found in the R thorax  Initial plan was for admission, however, patient adamantly declined this  Patient reports that his pain is not bad enough to come into the hospital and he wants to go home  Patient was given incentive spirometer and was able to demonstrate adequate usage of it in front of me, pulling >1000 mL without any difficulty or pain  He reports minimal pain with deep breaths  Patient was agreeable to seeing PCP, and was informed of imaging findings  Chief Complaint: R chest pain    History of Present Illness   HPI:  Deb Loya is a 66 y o  male with a PMHx of liver transplantation, prior strokes, on plavix who presents with right chest pain  Patient reportedly fell yesterday and landed on his right side  He was seated when the fall happened  Since then, he has been sitting down due to the pain  He is unable to provide specific details about the incident  Daughter is with him and reports he never lost consciousness, that the fall was mechanical, but had difficulty getting up  He reports mostly landing on his R side but also hitting his head  He was originally brought in for ED evaluation but was upgraded to level B trauma as details came out  He has pain that worsens with deep breaths  Mechanism:Fall    Review of Systems   Constitutional: Negative for chills, diaphoresis, fatigue and fever  HENT: Negative for congestion and sore throat  Eyes: Negative for visual disturbance  Respiratory: Negative for cough, chest tightness and shortness of breath  Cardiovascular: Positive for chest pain  Negative for palpitations and leg swelling  Gastrointestinal: Negative for abdominal distention, abdominal pain, constipation, diarrhea, nausea and vomiting  Genitourinary: Negative for difficulty urinating and dysuria  Musculoskeletal: Negative for arthralgias and myalgias  Skin: Negative for rash  Neurological: Negative for dizziness, weakness, light-headedness, numbness and headaches  Psychiatric/Behavioral: Negative for agitation, behavioral problems and confusion  The patient is not nervous/anxious  All other systems reviewed and are negative  12-point, complete review of systems was reviewed and negative except as stated above         Historical Information      Past Medical History:   Diagnosis Date    Alcoholism ([facility] )     Cerebral artery aneurysm     Change in mental state     last assessed 5/18/15; resolved 10/27/15    Diabetes mellitus ([facility]     Drug dependence ([facility] )     Fatigue     last assessed 1/26/15; resolved 5/24/16    Hepatitis [facility] discharge follow-up 12/21/2018    Kidney disease     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[facility] )     Liver transplant recipient [facility])     Renal disorder     Subdural hygroma     2/27/14; resolved 7/28/15    Thrombocytopenia ([facility] ) 9/20/2017     Past Surgical History:   Procedure Laterality Date    BRAIN SURGERY  02/12/2014    left frontotemporal 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    CATARACT EXTRACTION      CHOLECYSTECTOMY      FL LUMBAR PUNCTURE DIAGNOSTIC  12/14/2018    IR PICC LINE  12/14/2018    LIVER TRANSPLANTATION      ROTATOR CUFF REPAIR      SHOULDER SURGERY       Social History   Social History     Substance and Sexual Activity   Alcohol Use No     Social History     Substance and Sexual Activity   Drug Use No    Comment: remotely quit drug use per [employer]      Social History     Tobacco Use   Smoking Status Never Smoker   Smokeless Tobacco Never Used   Tobacco Comment    former smoker per [employer] E-Cigarette/Vaping     E-Cigarette/Vaping Substances    Nicotine No     THC No     CBD No     Flavoring No     Other No     Unknown No      Immunization History   Administered Date(s) Administered    INFLUENZA 09/24/2009, 10/04/2010, 09/27/2014, 10/27/2015, 10/01/2016, 11/10/2016, 10/02/2017    Influenza Split High Dose Preservative Free IM 10/27/2015, 10/02/2017    Influenza, high dose seasonal 0 7 mL 12/18/2018, 09/19/2019, 10/03/2020    Influenza, seasonal, injectable 11/12/2012, 09/11/2013, 10/01/2016    Pneumococcal Conjugate 13-Valent 10/27/2015, 12/29/2016    Pneumococcal Polysaccharide PPV23 09/03/2013    Tdap 10/27/2015    Zoster 06/04/2013     Last Tetanus: UTD  Family History: Non-contributory      Meds/Allergies   all current active meds have been reviewed    Allergies   Allergen Reactions    Tequin [Gatifloxacin] Rash    Ciprofloxacin     Iv Contrast [Iodinated Diagnostic Agents]     Levofloxacin Rash    Lipitor [Atorvastatin] Rash     Rash and itching    Omnipaque [Iohexol]          PHYSICAL EXAM    Objective   Vitals:   First set: Temperature: 97 6 °F (36 4 °C) (05/23/21 1006)  Pulse: 71 (05/23/21 0928)  Respirations: 18 (05/23/21 0928)  Blood Pressure: (!) 133/103 (05/23/21 0928)    Primary Survey:   (A) Airway: Intact  (B) Breathing: Equal breath sounds BL  (C) Circulation: Pulses:   pedal  2/4, radial  2/4 and femoral  2/4  (D) Disabliity:  GCS Total:  15  (E) Expose:  Completed    Secondary Survey: (Click on Physical Exam tab above)  Physical Exam  Vitals signs and nursing note reviewed  Constitutional:       Appearance: He is well-developed  HENT:      Head: Normocephalic and atraumatic  Eyes:      Conjunctiva/sclera: Conjunctivae normal    Neck:      Musculoskeletal: Neck supple  Cardiovascular:      Rate and Rhythm: Normal rate and regular rhythm  Heart sounds: No murmur  Pulmonary:      Effort: Pulmonary effort is normal  No respiratory distress  Breath sounds: Normal breath sounds  Abdominal:      Palpations: Abdomen is soft  Tenderness: There is no abdominal tenderness  Musculoskeletal: Normal range of motion  General: Tenderness present  Comments: Patient has tenderness over R lateral thorax  Patient otherwise has normal ROM in all extremities, 5/5 strength   Skin:     General: Skin is warm and dry  Neurological:      General: No focal deficit present  Mental Status: He is alert and oriented to person, place, and time  Cranial Nerves: No cranial nerve deficit  Sensory: No sensory deficit  Motor: No weakness           Invasive Devices     None                 Lab Results:   Results: I have personally reviewed pertinent reports   , BMP/CMP:   Lab Results   Component Value Date    SODIUM 140 05/23/2021    K 5 0 05/23/2021     (H) 05/23/2021    CO2 24 05/23/2021    BUN 33 (H) 05/23/2021    CREATININE 2 85 (H) 05/23/2021    GLUCOSE 145 (H) 05/23/2021    CALCIUM 8 7 05/23/2021    AST 23 05/23/2021    ALT 18 05/23/2021    ALKPHOS 82 05/23/2021    EGFR 21 05/23/2021   , CBC:   Lab Results   Component Value Date    WBC 7 81 05/23/2021    HGB 11 2 (L) 05/23/2021    HCT 33 (L) 05/23/2021    MCV 81 (L) 05/23/2021    PLT 97 (L) 05/23/2021    MCH 24 1 (L) 05/23/2021    MCHC 29 8 (L) 05/23/2021    RDW 14 0 05/23/2021    MPV 12 8 (H) 05/23/2021    NRBC 0 05/23/2021   , Coagulation:   Lab Results   Component Value Date    INR 1 09 05/23/2021   , Lactate: No results found for: LACTATE, Amylase: No results found for: AMYLASE, Lipase: No results found for: LIPASE, AST:   Lab Results   Component Value Date    AST 23 05/23/2021   , ALT:   Lab Results   Component Value Date    ALT 18 05/23/2021   , Urinalysis: No results found for: COLORU, CLARITYU, SPECGRAV, PHUR, LEUKOCYTESUR, NITRITE, PROTEINUA, GLUCOSEU, KETONESU, BILIRUBINUR, BLOODU, CK: No results found for: CKTOTAL, Troponin: No results found for: TROPONINI, EtOH: No results found for: ETOH, UDS: No components found for: RAPIDDRUGSCREEN, Pregnancy: No results found for: PREGTESTUR, ABG: No results found for: PHART, MCX8COZ, PO2ART, PDH4LCC, N2FXJTSP, BEART, SOURCE, ISTAT: No components found for: VBG  Imaging/EKG Studies: See plan    Code Status: Prior  Advance Directive and Living Will:      Power of :    POLST:

## 2021-05-23 NOTE — DISCHARGE INSTR - AVS FIRST PAGE
You were offered admission to the hospital and declined it today  We recommend you follow up with your primary care doctor tomorrow for re-evaluation of your breathing and pain, and also discuss your imaging findings  Please follow all provided return precautions  For your rib fractures, please take the prescribed tylenol and robaxin  Lidocaine patches can also be used and can be purchased at your local pharmacy  If you develop any difficulty breathing, worsening pain, fevers, shortness of breath, cough, return immediately for re-evaluation  Please use the provided incentive spirometer as was described to you, every 10 times an hour for the next several days

## 2021-05-23 NOTE — DISCHARGE INSTRUCTIONS
Prevención de caídas   LO QUE NECESITA SABER:   La prevención de caídas incluye formas de hacer más seguro diaz hogar y [place]  También incluye cómo moverse más cuidadosamente para evitar carmen caída  Las condiciones médicas que provocan cambios en la presión arterial, la visión o la fuerza muscular y la coordinación pueden llegar a aumentar diaz riesgo de caídas  Los [employer] pueden aumentar diaz riesgo de caídas si le provocan mareos, debilidad o somnolencia  INSTRUCCIONES SOBRE EL ESTHER HOSPITALARIA:   Llame al 911 o pídale a alguien más que lo bashir si:  · Se ha caído y está inconsciente  · Se ha caído y no puede  parte de diaz cuerpo  Comuníquese con diaz médico si:  · Se ha caído y tiene dolor en el cuerpo o dolor de [place]  · Usted tiene preguntas o inquietudes acerca de diaz condición o cuidado  Recomendaciones para prevenir caídas:  · Póngase de pie o siéntese despacio  [place] puede ayudarlo a mantener diaz equilibrio y prevenir carmen caída  · Use dispositivos de apoyo bao se le indique  Diaz médico le puede recomendar que use un bastón o un caminador para que lo asista en diaz equilibrio  Es posible que necesite que le instalen barandas en el baño cerca al inodoro o en la ducha  · Use calzado que le quede rusty y tenga suelas antideslizantes  Use zapatos dentro y fuera de casa  Utilice pantunflas con carmen suela de buen agarre  No use zapatos con tacones altos  · Utilizar un dispositivo de [employer]  Jenny es un dispositivo que puede llevar puesto y le permite llamar al 331 en licha que se Donalda Clarity y necesite ayuda  Pídale a diaz médico más información  · Manténgase activo  El ejercicio puede ayudar a fortalecer los músculos y mejorar diaz equilibrio  Diaz médico le puede recomendar hacer ejercicios aeróbicos acuáticos o caminar  También le puede recomendar la fisioterapia para mejorar diaz coordinación   Nunca comience un programa de ejercicios sin consultarlo dharmesh con diaz médico          · Controle lorna afecciones médicas  Cumpla con todas las citas con lorna médicos  Visite al [employer] se le ha indicado  Recomendaciones para la seguridad en el hogar:      · Agregue elementos para prevenir caídas en el baño  Coloque tiras antideslizantes en el piso de la ducha o de la bañera para evitar resbalones  Use carmen alfombra de baño si no tiene alfombra en el cuarto de baño  [place] evitará que se caiga cuando sale de la ducha o la bañera  Use un asiento de ducha de modo que no necesitará ponerse de pie [place] se [place]  [place] en el inodoro o en carmen silla en el cuarto de baño para secarse y vestirse  [place] impedirá que pierda el equilibrio [place] se seca o se viste estando de pie  · Mantener los pasillos despejados  Despeje las vías y las escaleras por donde camina retirando los libros, los zapatos u otros objetos  Coloque los cables del teléfono y las lámparas fuera de diaz jermaine para que no tenga que caminar Daved Ditch  Si no puede moverlos, sujételos con cinta adhesiva  Retire los tapetes pequeños  Si no puede quitar un tapete, sujételo con cinta de doble ada  Lo cual evitará que se tropiece con éstos  · Instale carmen buena iluminación en diaz hogar  Use lamparillas de noche para ayudar a iluminar los pasillos al baño o a la cocina  Siempre encienda la wili antes de empezar a caminar  · Mantenga los objetos que [place] con frecuencia en estantes dentro de diaz alcance  No use un banquito para intentar alcanzar un elemento  · Pinte o coloque cinta reflectiva en los bordes de las escaleras  Lo cual puede ayudarle a chanelle mejor las escaleras  Acuda a lorna consultas de control con diaz médico según le indicaron  Anote lorna preguntas para que se acuerde de hacerlas soraya lorna visitas  © Copyright [address] Information is for End User's use only and may not be sold, redistributed or otherwise used for commercial purposes   All illustrations and images included in CareNotes® are the copyrighted property of A D A M , Inc  [address] es sólo para uso en educación  Diaz intención no es darle un consejo médico sobre enfermedades o tratamientos  Colsulte con diaz Burlene Humphrey farmacéutico antes de seguir cualquier régimen médico para saber si es seguro y efectivo para usted  Fractura de seth   CUIDADO AMBULATORIO:   [place] fractura de seth es carmen fisura o quebradura en carmen seth  Otras cosas que necesita saber sobre las fracturas de costillas:  · La causa más común es un traumatismo contundente por carmen caída o un accidente automovilístico  El trauma puede aumentar el riesgo de daño orgánico cuando se fractura carmen seth  · La edad avanzada, la osteoporosis o un tumor pueden aumentar el riesgo de fracturas de [place]  · Carmen fractura por estrés puede ocurrir en las costillas superiores o medias  Las fracturas por estrés pueden producirse cuando usted tiene tos jayson por Mariajose Josafat  También pueden ser causadas por movimientos atléticos danuta, Time Quach, el lanzamiento o el shayna  · Carmen condición llamada tórax batiente se produce si 3 o más de las costillas se rompen en 2 o más lugares  Esta condición podría dificultar la respiración  Los signos y síntomas comunes incluyen:  · El dolor en la pared torácica empeora cuando usted respira, se mueve o tose    · [place] o inflamación cerca de diaz lesión    · Falta de aire o dificultad para respirar hondo    Llame al Rachel Harries de emergencias local (911 en los [place]) si:  · Usted tiene dificultad para respirar  · Usted tiene dolor nuevo o creciente  Busque atención médica de inmediato si:  · Diaz dolor no mejora, aún después del tratamiento  · Usted tiene fiebre o tos  Llame a diaz médico si:  · Usted tiene preguntas o inquietudes acerca de diaz condición o cuidado        Medicamentos: Es posible que usted necesite alguno de los siguientes:  · [place] ibuprofeno, ayudan a disminuir la inflamación, el dolor y la [place]  Jenny medicamento está disponible con o sin carmen receta médica  Los [place] pueden causar sangrado estomacal o problemas renales en ciertas personas  Si usted martin un medicamento anticoagulante, siempre pregúntele a diaz médico si los [place] son seguros para usted  Siempre justin la etiqueta de jenny medicamento y [place] instrucciones  · Puede administrarse podrían administrarse  Pregunte al médico cómo debe frank jenny medicamento de forma stone  Algunos medicamentos recetados para el dolor contienen acetaminofén  No tome otros medicamentos que contengan acetaminofén sin consultarlo con diaz médico  Demasiado acetaminofeno puede causar daño al hígado  Los medicamentos recetados para el dolor podrían causar estreñimiento  Pregunte a diaz médico bao prevenir o tratar estreñimiento  · [place] lorna medicamentos bao se le haya indicado  Consulte con diaz médico si usted abigail que diaz medicamento no le está ayudando o si presenta efectos secundarios  Infórmele si es alérgico a cualquier medicamento  Mantenga carmen lista actualizada de los [place], las vitaminas y los productos herbales que martin  Incluya los siguientes datos de los medicamentos: cantidad, frecuencia y motivo de administración  Traiga con usted la lista o los envases de las píldoras a lorna citas de seguimiento  Lleve la lista de los medicamentos con usted en licha de carmen emergencia  Cuidados personales:  · Respire profundo y [address]  Bib[place] ayudará disminuir el riesgo de contraer carmen infección pulmonar  Abrace carmen almohada del lado lesionado para disminuir el dolor al respirar profundamente  Respire profundo y sostenga el aire lo más que pueda  Expulse todo el aire y [place]  La respiración profunda ayuda a abrir las vías respiratorias  Es posible que le proporcionen un espirómetro de incentivo para ayudarlo a respirar hondo   Coloque la boquilla plástica en la boca y respire lento y profundo, a continuación, exhale el aire y [place]  Repita estos pasos 10 veces por hora  · Descanse y límite la actividad bao se le indique  No empuje, jale ni levante objetos  Empiece a hacer más a medida que el dolor disminuye  Consulte con diaz médico cuánta actividad puede hacer  · Aplique hielo en el tórax, cerca de la seth fracturada, de 15 a 20 minutos [address] indicaciones  Use carmen compresa de hielo o ponga hielo triturado en carmen bolsa de plástico  Orrie Man con carmen toalla  El hielo ayuda a evitar daño al tejido y a disminuir la inflamación y el dolor  Acuda a la consulta de control con diaz médico según las indicaciones: Anote lorna preguntas para que se acuerde de hacerlas soraya lorna visitas  © Copyright [facility] Information is for End User's use only and may not be sold, redistributed or otherwise used for commercial purposes  All illustrations and images included in CareNotes® are the copyrighted property of A D A [employer]  or [address] [place] [address] es sólo para uso en educación  Diaz intención no es darle un consejo médico sobre enfermedades o tratamientos  Colsulte con diaz Demarcus Harada farmacéutico antes de seguir cualquier régimen médico para saber si es seguro y efectivo para usted

## 2021-06-06 ENCOUNTER — HOSPITAL ENCOUNTER (OUTPATIENT)
Dept: RADIOLOGY | Facility: HOSPITAL | Age: 71
Discharge: HOME | End: 2021-06-06
Attending: PSYCHIATRY & NEUROLOGY
Payer: COMMERCIAL

## 2021-06-06 DIAGNOSIS — I63.442 CEREBROVASCULAR ACCIDENT (CVA) DUE TO EMBOLISM OF LEFT CEREBELLAR ARTERY (HCC): ICD-10-CM

## 2021-06-06 DIAGNOSIS — G31.84 AMNESTIC MCI (MILD COGNITIVE IMPAIRMENT WITH MEMORY LOSS): ICD-10-CM

## 2021-06-06 DIAGNOSIS — G45.0 VERTEBROBASILAR ARTERY INSUFFICIENCY: ICD-10-CM

## 2021-06-06 DIAGNOSIS — G62.9 PERIPHERAL POLYNEUROPATHY: ICD-10-CM

## 2021-06-06 DIAGNOSIS — I63.9 LEFT THALAMIC INFARCTION (HCC): ICD-10-CM

## 2021-06-06 DIAGNOSIS — K70.30 ALCOHOLIC CIRRHOSIS, UNSPECIFIED WHETHER ASCITES PRESENT (HCC): ICD-10-CM

## 2021-06-06 PROCEDURE — G1004 CDSM NDSC: HCPCS

## 2021-06-06 PROCEDURE — 70551 MRI BRAIN STEM W/O DYE: CPT

## 2021-06-28 DIAGNOSIS — E13.40 NEUROPATHY DUE TO SECONDARY DIABETES MELLITUS (HCC): ICD-10-CM

## 2021-06-28 RX ORDER — TRAMADOL HYDROCHLORIDE 50 MG/1
50 TABLET ORAL EVERY 8 HOURS PRN
Qty: 180 TABLET | Refills: 0 | Status: SHIPPED | OUTPATIENT
Start: 2021-06-28 | End: 2021-09-21 | Stop reason: SDUPTHER

## 2021-06-28 NOTE — TELEPHONE ENCOUNTER
Medication:traMADol (ULTRAM) 50 mg tablet       Dosage:  How Often:Sig: Take 1 tablet (50 mg total) by mouth every 8 (eight) hours as needed for moderate pain  Quantity:  180  Last Office Visit: 4/21/2021  Next Office Visit:8/24/2021  Last refilled: 5/6/2021  How many pills left:  Pharmacy:   [facility],[place] [address], [place] - [address]os [facility]   [address] [address]  Phone: [phone] Fax: [phone]

## 2021-06-28 NOTE — TELEPHONE ENCOUNTER
Medication:  PDMP   04/09/2021  1  04/09/2021  TRAMADOL HCL 50 MG TABLET  180 0  60  LO CIM        Active agreement on file -No

## 2021-07-10 DIAGNOSIS — E78.5 HYPERLIPIDEMIA, UNSPECIFIED HYPERLIPIDEMIA TYPE: ICD-10-CM

## 2021-07-11 RX ORDER — ROSUVASTATIN CALCIUM 20 MG/1
TABLET, COATED ORAL
Qty: 90 TABLET | Refills: 3 | Status: SHIPPED | OUTPATIENT
Start: 2021-07-11 | End: 2021-08-13 | Stop reason: DRUGHIGH

## 2021-07-26 ENCOUNTER — APPOINTMENT (OUTPATIENT)
Dept: LAB | Facility: HOSPITAL | Age: 71
End: 2021-07-26
Payer: COMMERCIAL

## 2021-07-26 DIAGNOSIS — Z79.4 TYPE 2 DIABETES MELLITUS WITH DIABETIC PERIPHERAL ANGIOPATHY WITHOUT GANGRENE, WITH LONG-TERM CURRENT USE OF INSULIN (HCC): ICD-10-CM

## 2021-07-26 DIAGNOSIS — E11.51 TYPE 2 DIABETES MELLITUS WITH DIABETIC PERIPHERAL ANGIOPATHY WITHOUT GANGRENE, WITH LONG-TERM CURRENT USE OF INSULIN (HCC): ICD-10-CM

## 2021-07-26 LAB
EST. AVERAGE GLUCOSE BLD GHB EST-MCNC: 169 MG/DL
HBA1C MFR BLD: 7.5 %

## 2021-07-26 PROCEDURE — 83036 HEMOGLOBIN GLYCOSYLATED A1C: CPT

## 2021-08-13 ENCOUNTER — OFFICE VISIT (OUTPATIENT)
Dept: NEUROLOGY | Facility: CLINIC | Age: 71
End: 2021-08-13
Payer: COMMERCIAL

## 2021-08-13 VITALS
HEART RATE: 75 BPM | TEMPERATURE: 97.6 F | SYSTOLIC BLOOD PRESSURE: 126 MMHG | WEIGHT: 129.6 LBS | BODY MASS INDEX: 22.96 KG/M2 | DIASTOLIC BLOOD PRESSURE: 63 MMHG | HEIGHT: 63 IN

## 2021-08-13 DIAGNOSIS — G31.84 MILD COGNITIVE IMPAIRMENT: ICD-10-CM

## 2021-08-13 DIAGNOSIS — E78.2 MIXED HYPERLIPIDEMIA: ICD-10-CM

## 2021-08-13 DIAGNOSIS — E11.51 TYPE 2 DIABETES MELLITUS WITH DIABETIC PERIPHERAL ANGIOPATHY WITHOUT GANGRENE, WITH LONG-TERM CURRENT USE OF INSULIN (HCC): ICD-10-CM

## 2021-08-13 DIAGNOSIS — Z86.73 HISTORY OF CVA (CEREBROVASCULAR ACCIDENT): Primary | ICD-10-CM

## 2021-08-13 DIAGNOSIS — Z79.4 TYPE 2 DIABETES MELLITUS WITH DIABETIC PERIPHERAL ANGIOPATHY WITHOUT GANGRENE, WITH LONG-TERM CURRENT USE OF INSULIN (HCC): ICD-10-CM

## 2021-08-13 PROBLEM — I63.9 LEFT THALAMIC INFARCTION (HCC): Status: RESOLVED | Noted: 2018-12-17 | Resolved: 2021-08-13

## 2021-08-13 PROBLEM — I63.81 LEFT THALAMIC INFARCTION (HCC): Status: RESOLVED | Noted: 2018-12-17 | Resolved: 2021-08-13

## 2021-08-13 PROCEDURE — 3008F BODY MASS INDEX DOCD: CPT | Performed by: PHYSICIAN ASSISTANT

## 2021-08-13 PROCEDURE — 1160F RVW MEDS BY RX/DR IN RCRD: CPT | Performed by: PHYSICIAN ASSISTANT

## 2021-08-13 PROCEDURE — 1036F TOBACCO NON-USER: CPT | Performed by: PHYSICIAN ASSISTANT

## 2021-08-13 PROCEDURE — 99214 OFFICE O/P EST MOD 30 MIN: CPT | Performed by: PHYSICIAN ASSISTANT

## 2021-08-13 RX ORDER — ROSUVASTATIN CALCIUM 40 MG/1
40 TABLET, COATED ORAL DAILY
Qty: 30 TABLET | Refills: 5 | Status: SHIPPED | OUTPATIENT
Start: 2021-08-13 | End: 2021-12-18

## 2021-08-13 NOTE — PATIENT INSTRUCTIONS
Continue Plavix 75mg daily  Increase Crestor to 40mg daily  Repeat a cholesterol panel in 6 weeks   Continue heart healthy diet and routine exercise as tolerated  Continue to follow with PCP for management of blood pressure, cholesterol and blood sugar  Follow up in 6 months or sooner if needed    If you experience any facial droop, weakness on one side of the body, speech or swallowing difficulty, painless loss of vision in one eye, double vision, vertigo that does not resolve quickly/imbalance, go to the ER/call 911

## 2021-08-13 NOTE — ASSESSMENT & PLAN NOTE
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  He has intracranial stenosis and risk factors of HTN, HLD, DM  No new symptoms to indicate recurrent TIA/CVA  He has had some progression of intracranial atherosclerotic disease seen on recent MRA  He had not been compliant with Plavix prior to this  His most recent A1C was 7 5  Lipid panel from March 2021 with LDL of 136, on Crestor 20mg daily  Discussed it would be ideal to have better control of his cardiovascular risk factors  BP well controlled  I would suggest increasing his Crestor from 20mg to 40mg, with goal LDL <70  Goal A1C is <7 0  He does not smoke  Discussed compliance with medications      Plan:  -for ongoing stroke prevention, continue Plavix 75mg daily, statin and appropriate blood pressure and glycemic control  -increase Crestor to 40mg daily and repeat lipid panel in 6 weeks  -will ultimately defer management of BP, lipids and blood sugar to his PCP  -heart healthy diet and routine exercise as tolerated

## 2021-08-13 NOTE — ASSESSMENT & PLAN NOTE
Recent MRI brain with NeuroQuant with limited volumetric analysis due to prior right PCA stroke with encephalomalacia in the mesial left temporal lobe and ex vacuo dilatation of the temporal horn of the left lateral ventricle  [place] today was 14/30  Likely multifactorial due to prior strokes and vascular risk factors (?early vascular dementia), as well as contribution from prior alcohol abuse, medication effects  He is maintaining ADLs without difficulty  Encouraged him to keep active mentally, physically and socially  We will continue to monitor over time

## 2021-08-13 NOTE — PROGRESS NOTES
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  He has intracranial stenosis and risk factors of HTN, HLD, DM  No new symptoms to indicate recurrent TIA/CVA  He has had some progression of intracranial atherosclerotic disease seen on recent MRA  He had not been compliant with Plavix prior to this  His most recent A1C was 7 5  Lipid panel from March 2021 with LDL of 136, on Crestor 20mg daily  Discussed it would be ideal to have better control of his cardiovascular risk factors  BP well controlled  I would suggest increasing his Crestor from 20mg to 40mg, with goal LDL <70  Goal A1C is <7 0  He does not smoke  Discussed compliance with medications  Plan:  -for ongoing stroke prevention, continue Plavix 75mg daily, statin and appropriate blood pressure and glycemic control  -increase Crestor to 40mg daily and repeat lipid panel in 6 weeks  -will ultimately defer management of BP, lipids and blood sugar to his PCP  -heart healthy diet and routine exercise as tolerated      Mild cognitive impairment  Recent MRI brain with NeuroQuant with limited volumetric analysis due to prior right PCA stroke with encephalomalacia in the mesial left temporal lobe and ex vacuo dilatation of the temporal horn of the left lateral ventricle  MOCA in [address] today was 14/30  Likely multifactorial due to prior strokes and vascular risk factors (?early vascular dementia), as well as contribution from prior alcohol abuse, medication effects  He is maintaining ADLs without difficulty  Encouraged him to keep active mentally, physically and socially  We will continue to monitor over time  Patient to follow up in 6 months or sooner if needed  He was advised to call for any new or worsening symptoms       Diagnoses and all orders for this visit:    History of CVA (cerebrovascular accident)  -     Lipid panel; Future    Mixed hyperlipidemia  -     rosuvastatin (CRESTOR) 40 MG tablet; Take 1 tablet (40 mg total) by mouth daily  -     Lipid panel; Future    Type 2 diabetes mellitus with diabetic peripheral angiopathy without gangrene, with long-term current use of insulin (HCC)    Mild cognitive impairment           Subjective:    FERCHO Helton is a 66year old male who presents today for neurologic follow up  He was last seen by Dr Chela Elliott on 5/13/21  He has previously been followed for prior stroke, last seen by me in March 2019 for this issue  To review, patient with PMH of liver transplant on chronic immunosuppression, HTN, HLD, DM with neuropathy, CKD  He has a history of left lentiform nucleus ischemic stroke 9/2017, started on ASA and statin at that time  In Dec 2018 he was admitted for AMS, HA, vomiting and fever, with concern for infectious process initially  MRI brain showed an acute to early subacute punctate infarction in the left thalamus  MRA head demonstrated multifocal mild to moderate (50-60%) narrowing in the bilateral posterior cerebral arteries, suggesting atherosclerotic disease  No significant stenosis in the basilar artery  MRA carotids demonstrated no evidence of hemodynamically significant stenosis or occlusion of the carotid arteries  Absence of flow related signal in the right vertebral artery 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  Dominant left vertebral artery  He was discharged on Plavix 75mg daily and Lipitor 40mg daily  He had a rash with Lipitor, therefore discontinued  At timing of his last stroke visit in the office, he was to be initiated on Crestor 20mg daily  Patient did not continue follow up with our office after March 2019 visit  He returned to our office in May 2021 c/o memory difficulty and dizziness    Patient described intermittent events of room spinning sensation lasting for a week, and triggered by changing position, no nausea/vomiting  No signs of BPPV appreciated on exam in May  MOCA score was 9/30 with 0/5 words recalled  It was discussed that cognitive dysfunction is likely multifactorial, including English is not patient's primary language, with remote history of alcohol consumption as well as multiple strokes in brain parenchyma including thalamus  Patient received referral to follow with cognitive therapy  Imaging was advised  He also reported not taking Plavix, although it was on his med list and had refills available  He was advised to re-start Plavix and continue statin  Patient had MRI brain with NeuroQuant on 6/6/21  This demonstrated mild atrophy, old L PCA infarct, microangiopathy  NeuroQuant analysis was performed: Volumetric analysis limited due to old left PCA distribution infarct with encephalomalacia in the mesial left temporal lobe and ex vacuo dilatation of the temporal horn of the left lateral ventricle  MRA head 6/6/21  Status post post clipping of left anterior choroidal aneurysm  No new or recurrent aneurysm  New mild/moderate stenosis in the cavernous segment of the right internal carotid artery  New moderate stenosis in the proximal basilar artery  Stable absence of flow related signal in the right vertebral artery  Stable atherosclerotic disease in the posterior cerebral arteries, right greater than left  Today, patient presents with his girlfriend  He reports he has been doing well, offers no complaints  Memory is "stable"  He did not complete speech therapy  Last A1C 7/26/21 was 7 5  Last lipid panel 3/29/21 with ,   He is compliant with Plavix and statin      The following portions of the patient's history were reviewed and updated as appropriate: current medications, past family history, past medical history, past social history, past surgical history and problem list          Objective:    Blood pressure 126/63, pulse 75, temperature 97 6 °F (36 4 °C), temperature source Skin, height 5' 3" (1 6 m), weight 58 8 kg (129 lb 9 6 oz)  Physical Exam  Eyes:      Extraocular Movements: EOM normal       Pupils: Pupils are equal, round, and reactive to light  Neurological:      Coordination: Coordination is intact  Deep Tendon Reflexes: Strength normal and reflexes are normal and symmetric  Psychiatric:         Speech: Speech normal          Neurological Exam  Mental Status   Oriented to person, place, time and situation  Speech is normal   Tamazight MOCA version 7 2    Score: 14/30 (13+1 for education)    Visuospatial/Executive:  2/5  Naming:  3/3  Attention:  4/6  Language:  0/3  Abstraction:  0/2  Delayed recall:  1/5  Orientation:  3/6    Cranial Nerves  CN II: Visual fields full to confrontation  CN III, IV, VI: Extraocular movements intact bilaterally  Pupils equal round and reactive to light bilaterally  CN V: Facial sensation is normal   CN VII: Full and symmetric facial movement  CN VIII: Hearing is normal   CN IX, X: Palate elevates symmetrically  CN XI: Shoulder shrug strength is normal   CN XII: Tongue midline without atrophy or fasciculations  Motor   Normal muscle tone  Strength is 5/5 throughout all four extremities  Sensory  Light touch is normal in upper and lower extremities  Reflexes  Deep tendon reflexes are 2+ and symmetric in all four extremities with downgoing toes bilaterally  Coordination  Finger-to-nose, rapid alternating movements and heel-to-shin normal bilaterally without dysmetria  Gait  Casual gait is normal including stance, stride, and arm swing  ROS:    Review of Systems   Constitutional: Negative  Negative for appetite change and fever  HENT: Negative  Negative for hearing loss, tinnitus, trouble swallowing and voice change  Eyes: Negative  Negative for photophobia and pain  Respiratory: Negative  Negative for shortness of breath  Cardiovascular: Negative  Negative for palpitations  Gastrointestinal: Negative  Negative for nausea and vomiting  Endocrine: Negative  Negative for cold intolerance  Genitourinary: Negative  Negative for dysuria, frequency and urgency  Musculoskeletal: Negative  Negative for myalgias and neck pain  Skin: Negative  Negative for rash  Neurological: Positive for weakness (both legs) and light-headedness  Negative for dizziness, tremors, seizures, syncope, facial asymmetry, speech difficulty, numbness and headaches  Hematological: Negative  Does not bruise/bleed easily  Psychiatric/Behavioral: Negative  Negative for confusion, hallucinations and sleep disturbance       I personally reviewed and updated the ROS as appropriate

## 2021-08-18 ENCOUNTER — RA CDI HCC (OUTPATIENT)
Dept: OTHER | Facility: HOSPITAL | Age: 71
End: 2021-08-18

## 2021-08-18 NOTE — PROGRESS NOTES
[place] [id number]  coding opportunities             Chart Reviewed * (Number of) Inbasket suggestions sent to Provider: 2     Problem listed updated  Provider Accepted, (number of) suggestions accepted: 2               Patients insurance company: [address]  (Medicare Advantage and [employer])     Visit status: Patient canceled the appointment        [place] [address]  coding opportunities             Chart Reviewed * (Number of) Inbasket suggestions sent to Provider: 2     Problem listed updated   Provider Accepted, (number of) suggestions accepted: 2               Patients insurance company: [address]  (Medicare Advantage and [employer])           [place] [address]  coding opportunities             Chart Reviewed * (Number of) Inbasket suggestions sent to Provider: 2     E11 22, N18 4 T2DM with chronic kidney disease stage 4 ([place] [id number] )     If this is correct, please document and assess at your next visit 8/24/21                 Patients insurance company: [address]  (Medicare Advantage and dentalDoctors)

## 2021-08-19 DIAGNOSIS — E11.9 CONTROLLED TYPE 2 DIABETES MELLITUS WITHOUT COMPLICATION, WITH LONG-TERM CURRENT USE OF INSULIN (HCC): ICD-10-CM

## 2021-08-19 DIAGNOSIS — E11.51 TYPE 2 DIABETES MELLITUS WITH DIABETIC PERIPHERAL ANGIOPATHY WITHOUT GANGRENE, WITH LONG-TERM CURRENT USE OF INSULIN (HCC): Primary | ICD-10-CM

## 2021-08-19 DIAGNOSIS — Z79.4 CONTROLLED TYPE 2 DIABETES MELLITUS WITHOUT COMPLICATION, WITH LONG-TERM CURRENT USE OF INSULIN (HCC): ICD-10-CM

## 2021-08-19 DIAGNOSIS — Z79.4 TYPE 2 DIABETES MELLITUS WITH DIABETIC PERIPHERAL ANGIOPATHY WITHOUT GANGRENE, WITH LONG-TERM CURRENT USE OF INSULIN (HCC): Primary | ICD-10-CM

## 2021-08-19 PROBLEM — N18.4 STAGE 4 CHRONIC KIDNEY DISEASE (HCC): Status: ACTIVE | Noted: 2017-09-20

## 2021-08-19 NOTE — TELEPHONE ENCOUNTER
Medication: glucose blood (GREER CONTOUR TEST) test strip  Dosage:  How Often: 1 each by Other route 3 (three) times a day Test  Quantity:  300  Last Office Visit: 4/21/2021  Next Office Visit:8/24/2021  Last refilled: unsure  How many pills left: 0  Pharmacy:   [facility],[place] [address], [place]O  [address]   [address] [address]  Phone: [phone] Fax: [phone]

## 2021-08-23 ENCOUNTER — IMMUNIZATIONS (OUTPATIENT)
Dept: FAMILY MEDICINE CLINIC | Facility: HOSPITAL | Age: 71
End: 2021-08-23

## 2021-08-23 DIAGNOSIS — Z23 ENCOUNTER FOR IMMUNIZATION: Primary | ICD-10-CM

## 2021-08-23 PROCEDURE — 91301 SARS-COV-2 / COVID-19 MRNA VACCINE (MODERNA) 100 MCG: CPT

## 2021-08-23 PROCEDURE — 0011A SARS-COV-2 / COVID-19 MRNA VACCINE (MODERNA) 100 MCG: CPT

## 2021-08-30 ENCOUNTER — TELEPHONE (OUTPATIENT)
Dept: FAMILY MEDICINE CLINIC | Facility: CLINIC | Age: 71
End: 2021-08-30

## 2021-08-30 DIAGNOSIS — I63.9 LEFT THALAMIC INFARCTION (HCC): ICD-10-CM

## 2021-08-30 DIAGNOSIS — R21 RASH: ICD-10-CM

## 2021-08-30 DIAGNOSIS — E11.40 CONTROLLED TYPE 2 DIABETES MELLITUS WITH DIABETIC NEUROPATHY, WITH LONG-TERM CURRENT USE OF INSULIN (HCC): ICD-10-CM

## 2021-08-30 DIAGNOSIS — G47.00 INSOMNIA, UNSPECIFIED TYPE: ICD-10-CM

## 2021-08-30 DIAGNOSIS — G62.9 NEUROPATHY: ICD-10-CM

## 2021-08-30 DIAGNOSIS — Z94.4 LIVER TRANSPLANTED (HCC): ICD-10-CM

## 2021-08-30 DIAGNOSIS — Z79.4 CONTROLLED TYPE 2 DIABETES MELLITUS WITH DIABETIC NEUROPATHY, WITH LONG-TERM CURRENT USE OF INSULIN (HCC): ICD-10-CM

## 2021-08-30 DIAGNOSIS — F51.02 ADJUSTMENT INSOMNIA: ICD-10-CM

## 2021-08-30 RX ORDER — PREGABALIN 50 MG/1
50 CAPSULE ORAL 3 TIMES DAILY
Qty: 90 CAPSULE | Refills: 3 | Status: CANCELLED | OUTPATIENT
Start: 2021-08-30

## 2021-08-30 RX ORDER — PREGABALIN 50 MG/1
50 CAPSULE ORAL 3 TIMES DAILY
Qty: 90 CAPSULE | Refills: 3 | Status: SHIPPED | OUTPATIENT
Start: 2021-08-30 | End: 2021-11-26

## 2021-08-30 RX ORDER — TRIAMCINOLONE ACETONIDE 1 MG/G
CREAM TOPICAL 2 TIMES DAILY
Qty: 80 G | Refills: 5 | Status: SHIPPED | OUTPATIENT
Start: 2021-08-30 | End: 2022-01-19

## 2021-08-30 RX ORDER — INSULIN DETEMIR 100 [IU]/ML
22 INJECTION, SOLUTION SUBCUTANEOUS
Qty: 15 ML | Refills: 5 | Status: ON HOLD | OUTPATIENT
Start: 2021-08-30 | End: 2022-05-30 | Stop reason: SDUPTHER

## 2021-08-30 RX ORDER — TACROLIMUS 1 MG/1
1 CAPSULE ORAL EVERY 12 HOURS
Qty: 180 CAPSULE | Refills: 3 | Status: SHIPPED | OUTPATIENT
Start: 2021-08-30

## 2021-08-30 RX ORDER — CLOPIDOGREL BISULFATE 75 MG/1
75 TABLET ORAL DAILY
Qty: 90 TABLET | Refills: 3 | Status: SHIPPED | OUTPATIENT
Start: 2021-08-30

## 2021-08-30 RX ORDER — ZOLPIDEM TARTRATE 10 MG/1
10 TABLET ORAL
Qty: 30 TABLET | Refills: 5 | Status: SHIPPED | OUTPATIENT
Start: 2021-08-30 | End: 2022-03-01 | Stop reason: SDUPTHER

## 2021-08-30 RX ORDER — TEMAZEPAM 30 MG/1
30 CAPSULE ORAL
Qty: 30 CAPSULE | Refills: 3 | Status: SHIPPED | OUTPATIENT
Start: 2021-08-30 | End: 2022-01-19

## 2021-08-30 NOTE — TELEPHONE ENCOUNTER
Pt emergency contact vishnu Beltran called  They had to switch pharmacies  I added the new pharmacy  I spoke with the insurance they called here  He needs new rx's sent for his meds  However he can only use One [employer] products for his diabetes supplies and glucose meter   These are the following meds to refill please through the pharmacy:    Medication: clopidogrel (PLAVIX  Dosage: 75 mg tablet   How Often: Take 1 tablet (75 mg total) by mouth daily  Quantity:  90  Last Office Visit: 04/21/21  Next Office Visit: 11/02/21  Last refilled: 05/06/21  How many pills left: 0  Pharmacy:   [address] [place] [address], [address] [place] [address]  Phone: [phone] Fax: [phone]      Medication: pregabalin (LYRICA      Dosage: 50 mg capsule       How Often: Take 1 capsule (50 mg total) by mouth 3 (three) times a day  Quantity:  90  Last Office Visit: 04/21/21  Next Office Visit: 11/02/21  Last refilled: 05/06/21  How many pills left:  Pharmacy:   [address] [place] [address], [address] [place] [id number]  Phone: [phone] Fax: [phone]    Medication: tacrolimus (PROGRAF)   Dosage: 1 mg capsule   How Often: Take 1 capsule (1 mg total) by mouth every 12 (twelve) hours  Quantity:  180  Last Office Visit: 04/21/21  Next Office Visit: 11/02/21  Last refilled: 05/06/21  How many pills left: none  Pharmacy:   [address] [place] [address], [address] [place], [place] [address] [address] [address]  Phone: [phone] Fax: [phone]    Medication: temazepam (RESTORIL      Dosage: 30 mg capsule       How Often: Take 1 capsule (30 mg total) by mouth daily at bedtime  Quantity:  90  Last Office Visit: 04/21/21  Next Office Visit: 11/02/21  Last refilled: 05/06/21  How many pills left: none  Pharmacy:   [address] [place] [address], [address] [place], [place] [address] [address] [address]  Phone: [phone] Fax: [phone]      Medication: triamcinolone (KENALOG      Dosage: 0 1 % cream       How Often: Apply topically 2 (two) times a day  Quantity:  80 g  Last Office Visit: 04/21/21  Next Office Visit: 11/02/21  Last refilled: 05/06/21  How many pills left:  Pharmacy:   [address] [place] [address], [address] [place] [address]  Phone: [phone] Fax: [phone]      Medication: zolpidem (AMBIEN      Dosage: 10 mg tablet       How Often: Take 1 tablet (10 mg total) by mouth daily at bedtime  Quantity:  90  Last Office Visit: 04/21/21  Next Office Visit: 11/02/21  Last refilled: 04/13/21  How many pills left: 0  Pharmacy:   [address] [place] [address], [address] [place] [address]  Phone: [phone] Fax: [phone]    Medication: LEVEMIR FLEXTOUCH       Dosage: 100 units/mL injection pen       How Often: ADMINISTER 22 UNITS UNDER THE SKIN DAILY AT BEDTIME  Quantity:  15 ml  Last Office Visit: 04/21/21  Next Office Visit: 11/02/21  Last refilled: 07/31/20  How many pills left: 0  Pharmacy:   [address] [place] [address], [address] [place], [place] [address] [facility] [employer] [address] [address]  Phone: [phone] Fax: [phone]

## 2021-08-31 DIAGNOSIS — Z79.4 CONTROLLED TYPE 2 DIABETES MELLITUS WITH DIABETIC NEUROPATHY, WITH LONG-TERM CURRENT USE OF INSULIN (HCC): ICD-10-CM

## 2021-08-31 DIAGNOSIS — E11.40 CONTROLLED TYPE 2 DIABETES MELLITUS WITH DIABETIC NEUROPATHY, WITH LONG-TERM CURRENT USE OF INSULIN (HCC): ICD-10-CM

## 2021-08-31 RX ORDER — PEN NEEDLE, DIABETIC 30 GX3/16"
NEEDLE, DISPOSABLE MISCELLANEOUS
Qty: 100 EACH | Refills: 5 | Status: SHIPPED | OUTPATIENT
Start: 2021-08-31 | End: 2022-05-17

## 2021-08-31 NOTE — TELEPHONE ENCOUNTER
[facility]    Insulin Pen Needle (PEN NEEDLES) 32G X 4 MM MISC [[id number]]     Order Details  Dose: -- Route: Does not apply Frequency: Daily at bedtime   Dispense Quantity: 100 each Refills: 5          Sig: by Does not apply route daily at bedtime         Start Date: 07/08/19 End Date: --   Written Date: 07/08/19 Expiration Date: 07/07/20     Lancets MISC [[id number]]     Order Details  Dose: -- Route: Does not apply Frequency: --   Dispense Quantity: -- Refills: --          Sig: by Does not apply route         Start Date: 05/12/17 End Date: --   Written Date: -- Expiration Date: --   Ordering Date: 06/06/18     Source:  Received from: Cristiane (Check blood sugar 3 times daily)     [facility]

## 2021-09-21 DIAGNOSIS — E13.40 NEUROPATHY DUE TO SECONDARY DIABETES MELLITUS (HCC): ICD-10-CM

## 2021-09-21 RX ORDER — TRAMADOL HYDROCHLORIDE 50 MG/1
50 TABLET ORAL EVERY 8 HOURS PRN
Qty: 180 TABLET | Refills: 0 | Status: SHIPPED | OUTPATIENT
Start: 2021-09-21 | End: 2021-11-17

## 2021-09-28 ENCOUNTER — TELEPHONE (OUTPATIENT)
Dept: NEPHROLOGY | Facility: CLINIC | Age: 71
End: 2021-09-28

## 2021-09-28 NOTE — TELEPHONE ENCOUNTER
Spoke with patient's SO and reminded her of repeat labs that are needed prior to 10/5 follow up  Patient will complete prior to this apt

## 2021-10-01 ENCOUNTER — APPOINTMENT (OUTPATIENT)
Dept: LAB | Facility: HOSPITAL | Age: 71
End: 2021-10-01
Attending: INTERNAL MEDICINE
Payer: COMMERCIAL

## 2021-10-01 DIAGNOSIS — N18.9 CHRONIC KIDNEY DISEASE, UNSPECIFIED CKD STAGE: ICD-10-CM

## 2021-10-01 DIAGNOSIS — Z86.73 HISTORY OF CVA (CEREBROVASCULAR ACCIDENT): ICD-10-CM

## 2021-10-01 DIAGNOSIS — E78.2 MIXED HYPERLIPIDEMIA: ICD-10-CM

## 2021-10-01 LAB
ANION GAP SERPL CALCULATED.3IONS-SCNC: 2 MMOL/L (ref 4–13)
BUN SERPL-MCNC: 36 MG/DL (ref 5–25)
CALCIUM SERPL-MCNC: 9.4 MG/DL (ref 8.3–10.1)
CHLORIDE SERPL-SCNC: 110 MMOL/L (ref 100–108)
CHOLEST SERPL-MCNC: 117 MG/DL (ref 50–200)
CO2 SERPL-SCNC: 27 MMOL/L (ref 21–32)
CREAT SERPL-MCNC: 2.6 MG/DL (ref 0.6–1.3)
GFR SERPL CREATININE-BSD FRML MDRD: 23 ML/MIN/1.73SQ M
GLUCOSE P FAST SERPL-MCNC: 58 MG/DL (ref 65–99)
HDLC SERPL-MCNC: 52 MG/DL
LDLC SERPL CALC-MCNC: 47 MG/DL (ref 0–100)
NONHDLC SERPL-MCNC: 65 MG/DL
POTASSIUM SERPL-SCNC: 4.8 MMOL/L (ref 3.5–5.3)
SODIUM SERPL-SCNC: 139 MMOL/L (ref 136–145)
TRIGL SERPL-MCNC: 90 MG/DL

## 2021-10-01 PROCEDURE — 36415 COLL VENOUS BLD VENIPUNCTURE: CPT

## 2021-10-01 PROCEDURE — 80061 LIPID PANEL: CPT

## 2021-10-01 PROCEDURE — 80048 BASIC METABOLIC PNL TOTAL CA: CPT

## 2021-10-01 PROCEDURE — 3066F NEPHROPATHY DOC TX: CPT | Performed by: INTERNAL MEDICINE

## 2021-10-05 ENCOUNTER — OFFICE VISIT (OUTPATIENT)
Dept: NEPHROLOGY | Facility: CLINIC | Age: 71
End: 2021-10-05
Payer: COMMERCIAL

## 2021-10-05 VITALS — SYSTOLIC BLOOD PRESSURE: 140 MMHG | DIASTOLIC BLOOD PRESSURE: 70 MMHG | HEART RATE: 80 BPM

## 2021-10-05 DIAGNOSIS — N28.9 RENAL INSUFFICIENCY: ICD-10-CM

## 2021-10-05 DIAGNOSIS — N18.9 CHRONIC RENAL IMPAIRMENT, UNSPECIFIED CKD STAGE: Primary | ICD-10-CM

## 2021-10-05 PROCEDURE — 99214 OFFICE O/P EST MOD 30 MIN: CPT | Performed by: INTERNAL MEDICINE

## 2021-10-26 DIAGNOSIS — Z79.4 CONTROLLED TYPE 2 DIABETES MELLITUS WITHOUT COMPLICATION, WITH LONG-TERM CURRENT USE OF INSULIN (HCC): ICD-10-CM

## 2021-10-26 DIAGNOSIS — E11.9 CONTROLLED TYPE 2 DIABETES MELLITUS WITHOUT COMPLICATION, WITH LONG-TERM CURRENT USE OF INSULIN (HCC): ICD-10-CM

## 2021-10-26 RX ORDER — BLOOD SUGAR DIAGNOSTIC
STRIP MISCELLANEOUS
Qty: 100 EACH | Refills: 0 | Status: SHIPPED | OUTPATIENT
Start: 2021-10-26 | End: 2021-12-07

## 2021-11-02 ENCOUNTER — OFFICE VISIT (OUTPATIENT)
Dept: FAMILY MEDICINE CLINIC | Facility: CLINIC | Age: 71
End: 2021-11-02
Payer: COMMERCIAL

## 2021-11-02 VITALS
HEIGHT: 63 IN | OXYGEN SATURATION: 97 % | DIASTOLIC BLOOD PRESSURE: 60 MMHG | SYSTOLIC BLOOD PRESSURE: 112 MMHG | TEMPERATURE: 97.2 F | WEIGHT: 126.2 LBS | BODY MASS INDEX: 22.36 KG/M2 | HEART RATE: 76 BPM | RESPIRATION RATE: 16 BRPM

## 2021-11-02 DIAGNOSIS — Z00.00 MEDICARE ANNUAL WELLNESS VISIT, SUBSEQUENT: Primary | ICD-10-CM

## 2021-11-02 DIAGNOSIS — E78.2 MIXED HYPERLIPIDEMIA: ICD-10-CM

## 2021-11-02 DIAGNOSIS — N18.32 TYPE 2 DIABETES MELLITUS WITH STAGE 3B CHRONIC KIDNEY DISEASE, WITH LONG-TERM CURRENT USE OF INSULIN (HCC): ICD-10-CM

## 2021-11-02 DIAGNOSIS — E11.22 TYPE 2 DIABETES MELLITUS WITH STAGE 3B CHRONIC KIDNEY DISEASE, WITH LONG-TERM CURRENT USE OF INSULIN (HCC): ICD-10-CM

## 2021-11-02 DIAGNOSIS — Z79.4 TYPE 2 DIABETES MELLITUS WITH STAGE 3B CHRONIC KIDNEY DISEASE, WITH LONG-TERM CURRENT USE OF INSULIN (HCC): ICD-10-CM

## 2021-11-02 DIAGNOSIS — N18.4 STAGE 4 CHRONIC KIDNEY DISEASE (HCC): ICD-10-CM

## 2021-11-02 DIAGNOSIS — Z12.5 SCREENING FOR PROSTATE CANCER: ICD-10-CM
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
## 2021-11-02 PROBLEM — S22.41XA CLOSED FRACTURE OF MULTIPLE RIBS OF RIGHT SIDE: Status: RESOLVED | Noted: 2021-05-23 | Resolved: 2021-11-02

## 2021-11-02 PROCEDURE — 3008F BODY MASS INDEX DOCD: CPT | Performed by: FAMILY MEDICINE

## 2021-11-02 PROCEDURE — 1160F RVW MEDS BY RX/DR IN RCRD: CPT | Performed by: FAMILY MEDICINE

## 2021-11-02 PROCEDURE — G0439 PPPS, SUBSEQ VISIT: HCPCS | Performed by: FAMILY MEDICINE

## 2021-11-02 PROCEDURE — 1036F TOBACCO NON-USER: CPT | Performed by: FAMILY MEDICINE

## 2021-11-02 PROCEDURE — 1101F PT FALLS ASSESS-DOCD LE1/YR: CPT | Performed by: FAMILY MEDICINE

## 2021-11-02 PROCEDURE — 1170F FXNL STATUS ASSESSED: CPT | Performed by: FAMILY MEDICINE

## 2021-11-02 PROCEDURE — 3288F FALL RISK ASSESSMENT DOCD: CPT | Performed by: FAMILY MEDICINE

## 2021-11-02 PROCEDURE — 1125F AMNT PAIN NOTED PAIN PRSNT: CPT | Performed by: FAMILY MEDICINE

## 2021-11-02 PROCEDURE — 3725F SCREEN DEPRESSION PERFORMED: CPT | Performed by: FAMILY MEDICINE

## 2021-11-02 PROCEDURE — 3066F NEPHROPATHY DOC TX: CPT | Performed by: FAMILY MEDICINE

## 2021-11-10 NOTE — PROGRESS NOTES
Assessment/Plan:    CKD (chronic kidney disease) stage 3, GFR 30-59 ml/min  Needs to follow up  Creatinine up slightly    Vitamin D deficiency  Check levels    Medicare annual wellness visit, subsequent  Discussed shingrix    Controlled type 2 diabetes with neuropathy ([facility] )  Check a1c  watchi diet    CVA (cerebral vascular accident) ([facility] )  Seen by neurology  stable         Problem List Items Addressed This Visit     CVA (cerebral vascular accident) ([facility] )     Seen by neurology  stable         Controlled type 2 diabetes with neuropathy ([facility] )     Check a1c  watchi diet         Relevant Orders    Hemoglobin A1C    CKD (chronic kidney disease) stage 3, GFR 30-59 ml/min (Chronic)     Needs to follow up  Creatinine up slightly         Insomnia    Pruritus    Relevant Medications    nystatin-triamcinolone (MYCOLOG-II) cream    triamcinolone (KENALOG) 0 1 % cream    Vitamin D deficiency     Check levels         Relevant Orders    Vitamin D 25 hydroxy    Medicare annual wellness visit, subsequent - Primary     Discussed shingrix         Screening for colon cancer    Relevant Orders    Ambulatory referral to Gastroenterology            Subjective:      Patient ID: Rian Cortez is a 77 y o  male  Here for follow up and awv  Itching has started again        The following portions of the patient's history were reviewed and updated as appropriate: allergies, current medications, past family history, past medical history, past social history, past surgical history and problem list     Review of Systems   Constitutional: Negative  HENT: Negative  Eyes: Negative  Respiratory: Negative  Cardiovascular: Negative  Gastrointestinal: Negative  Endocrine: Negative  Genitourinary: Negative  Musculoskeletal: Negative  Skin: Negative  Allergic/Immunologic: Negative  Neurological: Negative  Hematological: Negative  Psychiatric/Behavioral: Negative            Objective:      /80   Pulse 72   Resp Referral to MARY Hilliard, in the chart. 16   Ht 5' 4" (1 626 m)   Wt 59 9 kg (132 lb)   BMI 22 66 kg/m²          Physical Exam   Constitutional: He appears well-developed and well-nourished  Eyes: Pupils are equal, round, and reactive to light  Neck: Normal range of motion  Neck supple  Cardiovascular: Normal rate, regular rhythm, normal heart sounds and intact distal pulses  Pulmonary/Chest: Effort normal and breath sounds normal    Abdominal: Soft  Bowel sounds are normal    Musculoskeletal: Normal range of motion  Neurological: He is alert  Skin: Skin is warm and dry  Psychiatric: He has a normal mood and affect  His behavior is normal  Judgment and thought content normal    Nursing note and vitals reviewed

## 2021-11-16 DIAGNOSIS — E13.40 NEUROPATHY DUE TO SECONDARY DIABETES MELLITUS (HCC): ICD-10-CM

## 2021-11-17 RX ORDER — TRAMADOL HYDROCHLORIDE 50 MG/1
50 TABLET ORAL EVERY 8 HOURS PRN
Qty: 180 TABLET | Refills: 0 | Status: SHIPPED | OUTPATIENT
Start: 2021-11-17 | End: 2022-03-17

## 2021-12-07 DIAGNOSIS — E11.9 CONTROLLED TYPE 2 DIABETES MELLITUS WITHOUT COMPLICATION, WITH LONG-TERM CURRENT USE OF INSULIN (HCC): ICD-10-CM

## 2021-12-07 DIAGNOSIS — Z79.4 CONTROLLED TYPE 2 DIABETES MELLITUS WITHOUT COMPLICATION, WITH LONG-TERM CURRENT USE OF INSULIN (HCC): ICD-10-CM

## 2021-12-07 RX ORDER — BLOOD SUGAR DIAGNOSTIC
STRIP MISCELLANEOUS
Qty: 100 EACH | Refills: 0 | Status: SHIPPED | OUTPATIENT
Start: 2021-12-07 | End: 2022-01-05

## 2021-12-18 DIAGNOSIS — E78.2 MIXED HYPERLIPIDEMIA: ICD-10-CM

## 2021-12-18 RX ORDER — ROSUVASTATIN CALCIUM 40 MG/1
TABLET, COATED ORAL
Qty: 30 TABLET | Refills: 5 | Status: SHIPPED | OUTPATIENT
Start: 2021-12-18 | End: 2022-06-09

## 2021-12-21 DIAGNOSIS — E11.40 CONTROLLED TYPE 2 DIABETES MELLITUS WITH DIABETIC NEUROPATHY, WITH LONG-TERM CURRENT USE OF INSULIN (HCC): ICD-10-CM

## 2021-12-21 DIAGNOSIS — Z79.4 CONTROLLED TYPE 2 DIABETES MELLITUS WITH DIABETIC NEUROPATHY, WITH LONG-TERM CURRENT USE OF INSULIN (HCC): ICD-10-CM

## 2021-12-21 RX ORDER — LANCING DEVICE
EACH MISCELLANEOUS
Qty: 1 EACH | Refills: 0 | Status: SHIPPED | OUTPATIENT
Start: 2021-12-21 | End: 2022-01-14

## 2022-01-05 DIAGNOSIS — E11.9 CONTROLLED TYPE 2 DIABETES MELLITUS WITHOUT COMPLICATION, WITH LONG-TERM CURRENT USE OF INSULIN (HCC): ICD-10-CM

## 2022-01-05 DIAGNOSIS — Z79.4 CONTROLLED TYPE 2 DIABETES MELLITUS WITHOUT COMPLICATION, WITH LONG-TERM CURRENT USE OF INSULIN (HCC): ICD-10-CM

## 2022-01-05 RX ORDER — BLOOD SUGAR DIAGNOSTIC
STRIP MISCELLANEOUS
Qty: 100 EACH | Refills: 0 | Status: SHIPPED | OUTPATIENT
Start: 2022-01-05

## 2022-01-14 DIAGNOSIS — Z79.4 CONTROLLED TYPE 2 DIABETES MELLITUS WITH DIABETIC NEUROPATHY, WITH LONG-TERM CURRENT USE OF INSULIN (HCC): ICD-10-CM

## 2022-01-14 DIAGNOSIS — E11.40 CONTROLLED TYPE 2 DIABETES MELLITUS WITH DIABETIC NEUROPATHY, WITH LONG-TERM CURRENT USE OF INSULIN (HCC): ICD-10-CM

## 2022-01-14 RX ORDER — LANCING DEVICE
EACH MISCELLANEOUS
Qty: 1 EACH | Refills: 0 | Status: SHIPPED | OUTPATIENT
Start: 2022-01-14 | End: 2022-04-18

## 2022-01-18 DIAGNOSIS — G47.00 INSOMNIA, UNSPECIFIED TYPE: ICD-10-CM

## 2022-01-18 DIAGNOSIS — R21 RASH: ICD-10-CM

## 2022-01-19 RX ORDER — TEMAZEPAM 30 MG/1
30 CAPSULE ORAL
Qty: 30 CAPSULE | Refills: 5 | Status: SHIPPED | OUTPATIENT
Start: 2022-01-19 | End: 2022-07-27

## 2022-01-19 RX ORDER — TRIAMCINOLONE ACETONIDE 1 MG/G
CREAM TOPICAL
Qty: 80 G | Refills: 5 | Status: SHIPPED | OUTPATIENT
Start: 2022-01-19 | End: 2022-05-19

## 2022-02-10 ENCOUNTER — APPOINTMENT (OUTPATIENT)
Dept: LAB | Facility: HOSPITAL | Age: 72
End: 2022-02-10
Payer: COMMERCIAL

## 2022-02-10 DIAGNOSIS — E78.2 MIXED HYPERLIPIDEMIA: ICD-10-CM

## 2022-02-10 DIAGNOSIS — E11.22 TYPE 2 DIABETES MELLITUS WITH STAGE 3B CHRONIC KIDNEY DISEASE, WITH LONG-TERM CURRENT USE OF INSULIN (HCC): ICD-10-CM

## 2022-02-10 DIAGNOSIS — R79.1 ABNORMAL COAGULATION PROFILE: ICD-10-CM

## 2022-02-10 DIAGNOSIS — N18.32 TYPE 2 DIABETES MELLITUS WITH STAGE 3B CHRONIC KIDNEY DISEASE, WITH LONG-TERM CURRENT USE OF INSULIN (HCC): ICD-10-CM

## 2022-02-10 DIAGNOSIS — D68.9 POSTPARTUM COAGULATION DEFECTS, WITH DELIVERY (HCC): ICD-10-CM

## 2022-02-10 DIAGNOSIS — N18.9 CHRONIC RENAL IMPAIRMENT, UNSPECIFIED CKD STAGE: ICD-10-CM

## 2022-02-10 DIAGNOSIS — Z79.4 TYPE 2 DIABETES MELLITUS WITH STAGE 3B CHRONIC KIDNEY DISEASE, WITH LONG-TERM CURRENT USE OF INSULIN (HCC): ICD-10-CM

## 2022-02-10 DIAGNOSIS — Z94.4 LIVER REPLACED BY TRANSPLANT (HCC): Primary | ICD-10-CM

## 2022-02-10 DIAGNOSIS — Z12.5 SCREENING FOR PROSTATE CANCER: ICD-10-CM

## 2022-02-10 DIAGNOSIS — Z12.11 SCREENING FOR COLON CANCER: ICD-10-CM

## 2022-02-10 LAB
ALBUMIN SERPL BCP-MCNC: 3.5 G/DL (ref 3.5–5)
ALP SERPL-CCNC: 105 U/L (ref 46–116)
ALT SERPL W P-5'-P-CCNC: 20 U/L (ref 12–78)
ANION GAP SERPL CALCULATED.3IONS-SCNC: 5 MMOL/L (ref 4–13)
AST SERPL W P-5'-P-CCNC: 20 U/L (ref 5–45)
BILIRUB DIRECT SERPL-MCNC: 0.06 MG/DL (ref 0–0.2)
BILIRUB SERPL-MCNC: 1.28 MG/DL (ref 0.2–1)
BUN SERPL-MCNC: 43 MG/DL (ref 5–25)
CALCIUM SERPL-MCNC: 9.5 MG/DL (ref 8.3–10.1)
CHLORIDE SERPL-SCNC: 109 MMOL/L (ref 100–108)
CHOLEST SERPL-MCNC: 117 MG/DL
CO2 SERPL-SCNC: 25 MMOL/L (ref 21–32)
CREAT SERPL-MCNC: 3.06 MG/DL (ref 0.6–1.3)
CREAT UR-MCNC: 178 MG/DL
EST. AVERAGE GLUCOSE BLD GHB EST-MCNC: 160 MG/DL
GFR SERPL CREATININE-BSD FRML MDRD: 19 ML/MIN/1.73SQ M
GGT SERPL-CCNC: 33 U/L (ref 5–85)
GLUCOSE P FAST SERPL-MCNC: 110 MG/DL (ref 65–99)
HBA1C MFR BLD: 7.2 %
HDLC SERPL-MCNC: 31 MG/DL
LDLC SERPL CALC-MCNC: 42 MG/DL (ref 0–100)
MICROALBUMIN UR-MCNC: 110 MG/L (ref 0–20)
MICROALBUMIN/CREAT 24H UR: 62 MG/G CREATININE (ref 0–30)
POTASSIUM SERPL-SCNC: 4.9 MMOL/L (ref 3.5–5.3)
PROT SERPL-MCNC: 8.5 G/DL (ref 6.4–8.2)
PSA SERPL-MCNC: 0.1 NG/ML (ref 0–4)
SODIUM SERPL-SCNC: 139 MMOL/L (ref 136–145)
TRIGL SERPL-MCNC: 220 MG/DL
TSH SERPL DL<=0.05 MIU/L-ACNC: 3.04 UIU/ML (ref 0.36–3.74)

## 2022-02-10 PROCEDURE — 82977 ASSAY OF GGT: CPT

## 2022-02-10 PROCEDURE — 84443 ASSAY THYROID STIM HORMONE: CPT

## 2022-02-10 PROCEDURE — 83036 HEMOGLOBIN GLYCOSYLATED A1C: CPT

## 2022-02-10 PROCEDURE — 82248 BILIRUBIN DIRECT: CPT

## 2022-02-10 PROCEDURE — 82570 ASSAY OF URINE CREATININE: CPT

## 2022-02-10 PROCEDURE — 80053 COMPREHEN METABOLIC PANEL: CPT

## 2022-02-10 PROCEDURE — G0103 PSA SCREENING: HCPCS

## 2022-02-10 PROCEDURE — 80061 LIPID PANEL: CPT

## 2022-02-10 PROCEDURE — 82043 UR ALBUMIN QUANTITATIVE: CPT

## 2022-03-01 DIAGNOSIS — F51.02 ADJUSTMENT INSOMNIA: ICD-10-CM

## 2022-03-01 RX ORDER — ZOLPIDEM TARTRATE 10 MG/1
10 TABLET ORAL
Qty: 30 TABLET | Refills: 5 | Status: SHIPPED | OUTPATIENT
Start: 2022-03-01 | End: 2022-08-05

## 2022-03-03 ENCOUNTER — RA CDI HCC (OUTPATIENT)
Dept: OTHER | Facility: HOSPITAL | Age: 72
End: 2022-03-03

## 2022-03-03 NOTE — PROGRESS NOTES
[facility]  coding opportunities       Chart reviewed, no opportunity found: CHART REVIEWED, NO OPPORTUNITY FOUND                        Patients insurance company: [employer] (Medicare Advantage and Commercial)

## 2022-03-09 ENCOUNTER — OFFICE VISIT (OUTPATIENT)
Dept: FAMILY MEDICINE CLINIC | Facility: CLINIC | Age: 72
End: 2022-03-09
Payer: COMMERCIAL

## 2022-03-09 VITALS
HEART RATE: 77 BPM | OXYGEN SATURATION: 90 % | WEIGHT: 132.2 LBS | HEIGHT: 63 IN | DIASTOLIC BLOOD PRESSURE: 70 MMHG | TEMPERATURE: 98.1 F | BODY MASS INDEX: 23.42 KG/M2 | SYSTOLIC BLOOD PRESSURE: 124 MMHG | RESPIRATION RATE: 18 BRPM

## 2022-03-09 DIAGNOSIS — N18.32 TYPE 2 DIABETES MELLITUS WITH STAGE 3B CHRONIC KIDNEY DISEASE, WITH LONG-TERM CURRENT USE OF INSULIN (HCC): Primary | ICD-10-CM

## 2022-03-09 DIAGNOSIS — Z79.4 TYPE 2 DIABETES MELLITUS WITH DIABETIC PERIPHERAL ANGIOPATHY WITHOUT GANGRENE, WITH LONG-TERM CURRENT USE OF INSULIN (HCC): ICD-10-CM

## 2022-03-09 DIAGNOSIS — D69.6 THROMBOCYTOPENIA (HCC): Chronic | ICD-10-CM

## 2022-03-09 DIAGNOSIS — E78.2 MIXED HYPERLIPIDEMIA: ICD-10-CM

## 2022-03-09 DIAGNOSIS — E11.51 TYPE 2 DIABETES MELLITUS WITH DIABETIC PERIPHERAL ANGIOPATHY WITHOUT GANGRENE, WITH LONG-TERM CURRENT USE OF INSULIN (HCC): ICD-10-CM

## 2022-03-09 DIAGNOSIS — E11.22 TYPE 2 DIABETES MELLITUS WITH STAGE 3B CHRONIC KIDNEY DISEASE, WITH LONG-TERM CURRENT USE OF INSULIN (HCC): Primary | ICD-10-CM

## 2022-03-09 DIAGNOSIS — Z94.4 LIVER TRANSPLANT STATUS (HCC): ICD-10-CM

## 2022-03-09 DIAGNOSIS — Z79.4 TYPE 2 DIABETES MELLITUS WITH STAGE 3B CHRONIC KIDNEY DISEASE, WITH LONG-TERM CURRENT USE OF INSULIN (HCC): Primary | ICD-10-CM

## 2022-03-09 PROBLEM — N28.9 RENAL INSUFFICIENCY: Status: RESOLVED | Noted: 2019-01-24 | Resolved: 2022-03-09

## 2022-03-09 PROCEDURE — 3725F SCREEN DEPRESSION PERFORMED: CPT | Performed by: FAMILY MEDICINE

## 2022-03-09 PROCEDURE — 3288F FALL RISK ASSESSMENT DOCD: CPT | Performed by: FAMILY MEDICINE

## 2022-03-09 PROCEDURE — 3066F NEPHROPATHY DOC TX: CPT | Performed by: PHYSICIAN ASSISTANT

## 2022-03-09 PROCEDURE — 1101F PT FALLS ASSESS-DOCD LE1/YR: CPT | Performed by: FAMILY MEDICINE

## 2022-03-09 PROCEDURE — 99214 OFFICE O/P EST MOD 30 MIN: CPT | Performed by: FAMILY MEDICINE

## 2022-03-09 NOTE — PROGRESS NOTES
Patient's shoes and socks removed  Right Foot/Ankle   Right Foot Inspection  Skin Exam: dry skin  Toe Exam: ROM and strength within normal limits  Sensory   Monofilament testing: intact    Vascular  The right DP pulse is 2+  The right PT pulse is 2+  Left Foot/Ankle  Left Foot Inspection  Skin Exam: dry skin  Toe Exam: ROM and strength within normal limits  Sensory   Monofilament testing: intact    Vascular  The left DP pulse is 2+  The left PT pulse is 2+  Assign Risk Category  No deformity present  No loss of protective sensation  No weak pulses  Risk: 0    Assessment/Plan:    1  Type 2 diabetes mellitus with stage 3b chronic kidney disease, with long-term current use of insulin [facility])  Assessment & Plan:  Seeing nephrologist  Stable sugar  Lab Results   Component Value Date    HGBA1C 7 2 (H) 02/10/2022       Orders:  -     Hemoglobin A1C; Future; Expected date: 09/01/2022    2  Thrombocytopenia ([facility] )  Assessment & Plan:  stable      3  Mixed hyperlipidemia  Assessment & Plan:  Stable on current meds      4  Liver transplant status ([facility] )  Assessment & Plan:  stable      5  Type 2 diabetes mellitus with diabetic peripheral angiopathy without gangrene, with long-term current use of insulin ([facility] )  Assessment & Plan:  Stable  Foot exam today  Lab Results   Component Value Date    HGBA1C 7 2 (H) 02/10/2022               There are no Patient Instructions on file for this visit  Return in about 6 months (around 9/9/2022) for Recheck  Subjective:      Patient ID: Lanny Pool is a 66 y o  male  Chief Complaint   Patient presents with    Follow-up     Patient here for follow up on Type II Diabetes        Here for follow up  Here with wife  Hasn't been for eye exam  Seeing nephrology     Diabetes  He presents for his follow-up diabetic visit  He has type 2 diabetes mellitus  His disease course has been stable  There are no hypoglycemic associated symptoms   There are no diabetic associated symptoms  There are no hypoglycemic complications  Symptoms are stable  There are no diabetic complications  Risk factors for coronary artery disease include diabetes mellitus and dyslipidemia  Current diabetic treatment includes insulin injections  He is compliant with treatment all of the time  His weight is stable  He is following a diabetic diet  He has not had a previous visit with a dietitian  He participates in exercise weekly  There is no change in his home blood glucose trend  He does not see a podiatrist Eye exam is not current  Hyperlipidemia  This is a chronic problem  The current episode started more than 1 year ago  The problem is controlled  Recent lipid tests were reviewed and are normal  There are no known factors aggravating his hyperlipidemia  Current antihyperlipidemic treatment includes statins  The current treatment provides significant improvement of lipids  There are no compliance problems  The following portions of the patient's history were reviewed and updated as appropriate: allergies, current medications, past family history, past medical history, past social history, past surgical history and problem list     Review of Systems   Constitutional: Negative  HENT: Negative  Eyes: Negative  Respiratory: Negative  Cardiovascular: Negative  Gastrointestinal: Negative  Endocrine: Negative  Genitourinary: Negative  Musculoskeletal: Negative  Skin: Negative  Allergic/Immunologic: Negative  Neurological: Negative  Hematological: Negative  Psychiatric/Behavioral: Negative            Current Outpatient Medications   Medication Sig Dispense Refill    acetaminophen (TYLENOL) 500 mg tablet Take 1 tablet (500 mg total) by mouth every 6 (six) hours as needed for mild pain 30 tablet 0    Cetirizine HCl (ZYRTEC ALLERGY) 10 MG CAPS Take 1 tablet by mouth daily as needed      clopidogrel (PLAVIX) 75 mg tablet Take 1 tablet (75 mg total) by mouth daily 90 tablet 3    insulin detemir (Levemir FlexTouch) 100 Units/mL injection pen Inject 22 Units under the skin daily at bedtime 15 mL 5    Insulin Pen Needle (Pen Needles) 32G X 4 MM MISC Use daily at bedtime 100 each 5    INSULIN SYRINGE 1CC/29G 29G X 1/2" 1 ML MISC by Does not apply route      Lancet Devices (Lancing Device) MISC USE AS DIRECTED 1 each 0    Lancets MISC by Does not apply route      Multiple Vitamin tablet Take by mouth      OneTouch Ultra test strip TEST UP TO 3 TIMES A  each 0    pregabalin (LYRICA) 50 mg capsule TAKE 1 CAPSULE (50 MG TOTAL) BY MOUTH 3 (THREE) TIMES A DAY 90 capsule 5    rosuvastatin (CRESTOR) 40 MG tablet TAKE ONE (1) TABLET BY MOUTH DAILY 30 tablet 5    tacrolimus (PROGRAF) 1 mg capsule Take 1 capsule (1 mg total) by mouth every 12 (twelve) hours 180 capsule 3    tamsulosin (FLOMAX) 0 4 mg TAKE 1 CAPSULE(0 4 MG) BY MOUTH DAILY WITH DINNER 90 capsule 6    temazepam (RESTORIL) 30 mg capsule TAKE 1 CAPSULE (30 MG TOTAL) BY MOUTH DAILY AT BEDTIME 30 capsule 5    traMADol (ULTRAM) 50 mg tablet TAKE 1 TABLET (50 MG TOTAL) BY MOUTH EVERY 8 (EIGHT) HOURS AS NEEDED FOR MODERATE PAIN 180 tablet 0    triamcinolone (KENALOG) 0 1 % cream APPLY TO AFFECTED AREA TWICE DAILY 80 g 5    zolpidem (AMBIEN) 10 mg tablet Take 1 tablet (10 mg total) by mouth daily at bedtime 30 tablet 5     No current facility-administered medications for this visit  Objective:    /70 (BP Location: Left arm, Patient Position: Sitting, Cuff Size: Standard)   Pulse 77   Temp 98 1 °F (36 7 °C)   Resp 18   Ht 5' 3" (1 6 m)   Wt 60 kg (132 lb 3 2 oz)   SpO2 90%   BMI 23 42 kg/m²        Physical Exam  Vitals and nursing note reviewed  Constitutional:       Appearance: Normal appearance  HENT:      Head: Normocephalic and atraumatic  Eyes:      Extraocular Movements: Extraocular movements intact  Pupils: Pupils are equal, round, and reactive to light     Cardiovascular: Rate and Rhythm: Normal rate and regular rhythm  Pulses: Normal pulses  no weak pulses          Dorsalis pedis pulses are 2+ on the right side and 2+ on the left side  Posterior tibial pulses are 2+ on the right side and 2+ on the left side  Heart sounds: Normal heart sounds  Pulmonary:      Effort: Pulmonary effort is normal       Breath sounds: Normal breath sounds  Abdominal:      General: Abdomen is flat  Palpations: Abdomen is soft  Musculoskeletal:      Cervical back: Normal range of motion and neck supple  Feet:      Right foot:      Skin integrity: Dry skin present  Left foot:      Skin integrity: Dry skin present  Skin:     Capillary Refill: Capillary refill takes less than 2 seconds  Neurological:      General: No focal deficit present  Mental Status: He is alert and oriented to person, place, and time  Psychiatric:         Mood and Affect: Mood normal          Behavior: Behavior normal          Thought Content:  Thought content normal          Judgment: Judgment normal                 Stacie Borja DO

## 2022-03-17 DIAGNOSIS — E13.40 NEUROPATHY DUE TO SECONDARY DIABETES MELLITUS (HCC): ICD-10-CM

## 2022-03-17 RX ORDER — TRAMADOL HYDROCHLORIDE 50 MG/1
50 TABLET ORAL EVERY 8 HOURS PRN
Qty: 180 TABLET | Refills: 0 | Status: SHIPPED | OUTPATIENT
Start: 2022-03-17 | End: 2022-05-11

## 2022-03-23 ENCOUNTER — OFFICE VISIT (OUTPATIENT)
Dept: NEUROLOGY | Facility: CLINIC | Age: 72
End: 2022-03-23
Payer: COMMERCIAL

## 2022-03-23 VITALS
DIASTOLIC BLOOD PRESSURE: 68 MMHG | RESPIRATION RATE: 18 BRPM | HEIGHT: 63 IN | SYSTOLIC BLOOD PRESSURE: 118 MMHG | WEIGHT: 135.2 LBS | TEMPERATURE: 97.2 F | HEART RATE: 78 BPM | BODY MASS INDEX: 23.96 KG/M2

## 2022-03-23 DIAGNOSIS — H81.10 BPPV (BENIGN PAROXYSMAL POSITIONAL VERTIGO), UNSPECIFIED LATERALITY: ICD-10-CM

## 2022-03-23 DIAGNOSIS — G31.84 MILD COGNITIVE IMPAIRMENT: ICD-10-CM

## 2022-03-23 DIAGNOSIS — Z86.73 HISTORY OF CVA (CEREBROVASCULAR ACCIDENT): Primary | ICD-10-CM

## 2022-03-23 PROCEDURE — 3008F BODY MASS INDEX DOCD: CPT | Performed by: PHYSICIAN ASSISTANT

## 2022-03-23 PROCEDURE — 99214 OFFICE O/P EST MOD 30 MIN: CPT | Performed by: PHYSICIAN ASSISTANT

## 2022-03-23 PROCEDURE — 1036F TOBACCO NON-USER: CPT | Performed by: PHYSICIAN ASSISTANT

## 2022-03-23 PROCEDURE — 1160F RVW MEDS BY RX/DR IN RCRD: CPT | Performed by: PHYSICIAN ASSISTANT

## 2022-03-23 NOTE — ASSESSMENT & PLAN NOTE
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  He has intracranial stenosis and risk factors of HTN, HLD, DM  No new symptoms to indicate recurrent TIA/CVA  He has had some progression of intracranial atherosclerotic disease seen on recent MRA  He had not been compliant with Plavix prior to this  His most recent A1C was 7 3  At timing of last visit I increased his Crestor to 40 mg daily, as LDL was 136  His most recent LDL on a higher dose of Crestor was 42  He should continue to follow with his PCP regularly in order to control his cardiovascular risk factors  We discussed goal BP is less than 130/80 routinely, goal A1c is less than 7 0  LDL is currently at goal   He does not smoke  Discussed compliance with medications        Plan:  -for ongoing stroke prevention, continue Plavix 75mg daily, Crestor 40mg daily, and appropriate blood pressure and glycemic control  -will defer management of BP, lipids and blood sugar to his PCP  -heart healthy diet and routine exercise as tolerated  -signs and symptoms of stroke were reviewed

## 2022-03-23 NOTE — ASSESSMENT & PLAN NOTE
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

## 2022-03-23 NOTE — PATIENT INSTRUCTIONS
For ongoing stroke prevention continue Plavix 75 mg daily, Crestor 40 mg daily  Continue to follow with your PCP for management of blood pressure, cholesterol and blood sugar  Continue a heart healthy diet and routine exercise as tolerated  Continue to stay active mentally, socially and physically as much as possible  Will refer to PT for vestibular therapy for the vertigo    Follow-up in 6-8 months or sooner if needed    If you experience any facial droop, weakness on one side of the body, speech or swallowing difficulty, painless loss of vision in one eye, double vision, vertigo that does not resolve quickly/imbalance, go to the ER/call 911

## 2022-03-23 NOTE — PROGRESS NOTES
Patient ID: Trudi Whitehead is a 66 y o  male  Assessment/Plan:    History of CVA (cerebrovascular accident)  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  He has intracranial stenosis and risk factors of HTN, HLD, DM  No new symptoms to indicate recurrent TIA/CVA  He has had some progression of intracranial atherosclerotic disease seen on recent MRA  He had not been compliant with Plavix prior to this  His most recent A1C was 7 3  At timing of last visit I increased his Crestor to 40 mg daily, as LDL was 136  His most recent LDL on a higher dose of Crestor was 42  He should continue to follow with his PCP regularly in order to control his cardiovascular risk factors  We discussed goal BP is less than 130/80 routinely, goal A1c is less than 7 0  LDL is currently at goal   He does not smoke  Discussed compliance with medications  Plan:  -for ongoing stroke prevention, continue Plavix 75mg daily, Crestor 40mg daily, and appropriate blood pressure and glycemic control  -will defer management of BP, lipids and blood sugar to his PCP  -heart healthy diet and routine exercise as tolerated  -signs and symptoms of stroke were reviewed    Mild cognitive impairment  MRI brain with NeuroQuant June 2021 with limited volumetric analysis due to prior right PCA stroke with encephalomalacia in the mesial left temporal lobe and ex vacuo dilatation of the temporal horn of the left lateral ventricle  MOCA in [facility] today was 18/30, which is improved since his last visit  Clinically things are stable at home as well  Etiology of memory issues is likely multifactorial due to prior strokes and vascular risk factors (?early vascular dementia), as well as contribution from prior alcohol abuse, medication effects  He is maintaining ADLs without difficulty  Encouraged him to keep active mentally, physically and socially  We will continue to monitor over time      BPPV (benign paroxysmal positional vertigo), unspecified laterality  Patient describes intermittent positional vertigo, specifically when he wakes up in the morning and rolls over in bed, or when he turns his head quickly  Vertigo last a few seconds to a minute and then resolves on its own with no associated neurologic deficits  He can get this several times a day if he is having a bad day  He has had this in the past as well  No central findings on exam today  Likely BPPV  I will refer him to vestibular therapy for treatment  Patient to follow up in 6-8 months or sooner if needed  Diagnoses and all orders for this visit:    History of CVA (cerebrovascular accident)    Mild cognitive impairment    BPPV (benign paroxysmal positional vertigo), unspecified laterality  -     Ambulatory Referral to Physical Therapy; Future           Subjective:    FERCHO    Hector Moya is a 66year old male who presents today for neurologic follow up  He was last seen by Dr Rahman on 5/13/21  He has previously been followed for prior stroke, last seen by me in March 2019 for this issue  To review, patient with PMH of liver transplant on chronic immunosuppression, HTN, HLD, DM with neuropathy, CKD  He has a history of left lentiform nucleus ischemic stroke 9/2017, started on ASA and statin at that time  In Dec 2018 he was admitted for AMS, HA, vomiting and fever, with concern for infectious process initially  MRI brain showed an acute to early subacute punctate infarction in the left thalamus  MRA head demonstrated multifocal mild to moderate (50-60%) narrowing in the bilateral posterior cerebral arteries, suggesting atherosclerotic disease  No significant stenosis in the basilar artery  MRA carotids demonstrated no evidence of hemodynamically significant stenosis or occlusion of the carotid arteries    Absence of flow related signal in the right vertebral artery 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  Dominant left vertebral artery  He was discharged on Plavix 75mg daily and Lipitor 40mg daily  He had a rash with Lipitor, therefore discontinued  At timing of his last stroke visit in the office, he was to be initiated on Crestor 20mg daily  Patient did not continue follow up with our office after March 2019 visit  He returned to our office in May 2021 c/o memory difficulty and dizziness  Patient described intermittent events of room spinning sensation lasting for a week, and triggered by changing position, no nausea/vomiting  No signs of BPPV appreciated on exam in May  MOCA score was 9/30 with 0/5 words recalled  It was discussed that cognitive dysfunction is likely multifactorial, including English is not patient's primary language, with remote history of alcohol consumption as well as multiple strokes in brain parenchyma including thalamus  Patient received referral to follow with cognitive therapy  Imaging was advised  He also reported not taking Plavix, although it was on his med list and had refills available  He was advised to re-start Plavix and continue statin  Patient had MRI brain with NeuroQuant on 6/6/21  This demonstrated mild atrophy, old L PCA infarct, microangiopathy  NeuroQuant analysis was performed: Volumetric analysis limited due to old left PCA distribution infarct with encephalomalacia in the mesial left temporal lobe and ex vacuo dilatation of the temporal horn of the left lateral ventricle  MRA head 6/6/21  Status post post clipping of left anterior choroidal aneurysm  No new or recurrent aneurysm  New mild/moderate stenosis in the cavernous segment of the right internal carotid artery  New moderate stenosis in the proximal basilar artery  Stable absence of flow related signal in the right vertebral artery  Stable atherosclerotic disease in the posterior cerebral arteries, right greater than left  Today, patient presents with his girlfriend   He reports he has been doing well  Memory is "stable"  Last A1C 2/10/22 was 7 3  Last lipid panel 2/10/22 with , LDL 42  Lipid panel improved since his Crestor was increased at the last visit  He is compliant with Plavix and statin  His girlfriend does report that over the last few weeks he has had some episodes of vertigo  He has had this in the past as well of  Vertigo is always positional, for example when he woke up this morning and rolled over in bed he had a couple seconds of room spinning sensation that resolved on its own  This happens when he turns his head quickly as well  There are no other associated neurologic symptoms with the vertigo  The following portions of the patient's history were reviewed and updated as appropriate: current medications, past family history, past medical history, past social history, past surgical history and problem list          Objective:    Blood pressure 118/68, pulse 78, temperature (!) 97 2 °F (36 2 °C), temperature source Tympanic, resp  rate 18, height 5' 3" (1 6 m), weight 61 3 kg (135 lb 3 2 oz)  Physical Exam  Constitutional:       Appearance: Normal appearance  HENT:      Head: Normocephalic and atraumatic  Eyes:      Extraocular Movements: EOM normal       Pupils: Pupils are equal, round, and reactive to light  Neurological:      Mental Status: He is alert  Coordination: Coordination is intact  Deep Tendon Reflexes: Strength normal and reflexes are normal and symmetric  Psychiatric:         Mood and Affect: Mood normal          Speech: Speech normal          Behavior: Behavior normal          Neurological Exam  Mental Status  Alert  Oriented to person, place, time and situation  Speech is normal   MOCA:  18/30 (17+1)    Visuospatial/Executive:  35  Namin/3  Attention:  5/6  Language:  13  Abstraction:  1/2  Delayed recall:  0/5  Orientation:  5/6   Cranial Nerves  CN II: Visual fields full to confrontation    CN III, IV, VI: Extraocular movements intact bilaterally  Pupils equal round and reactive to light bilaterally  CN V: Facial sensation is normal   CN VII: Full and symmetric facial movement  CN VIII: Hearing is normal   CN IX, X: Palate elevates symmetrically  CN XI: Shoulder shrug strength is normal   CN XII: Tongue midline without atrophy or fasciculations  Motor   Normal muscle tone  Strength is 5/5 throughout all four extremities  Sensory  Light touch is normal in upper and lower extremities  Reflexes  Deep tendon reflexes are 2+ and symmetric in all four extremities with downgoing toes bilaterally  Coordination  Finger-to-nose, rapid alternating movements and heel-to-shin normal bilaterally without dysmetria  Gait  Casual gait is normal including stance, stride, and arm swing  ROS:    Review of Systems   Constitutional: Negative for chills and fever  HENT: Negative for ear pain and sore throat  Eyes: Negative for pain and visual disturbance  Respiratory: Negative for cough and shortness of breath  Cardiovascular: Negative for chest pain and palpitations  Gastrointestinal: Negative for abdominal pain and vomiting  Genitourinary: Negative for dysuria and hematuria  Musculoskeletal: Positive for neck pain  Negative for arthralgias and back pain  Skin: Negative for color change and rash  Neurological: Positive for dizziness, speech difficulty (forgets words at times), light-headedness and headaches (2 times a week pain scale of 8)  Negative for seizures and syncope  All other systems reviewed and are negative      I personally reviewed and updated the ROS as appropriate

## 2022-03-23 NOTE — ASSESSMENT & PLAN NOTE
MRI brain with NeuroQuant June 2021 with limited volumetric analysis due to prior right PCA stroke with encephalomalacia in the mesial left temporal lobe and ex vacuo dilatation of the temporal horn of the left lateral ventricle  MOCA in [address] today was 18/30, which is improved since his last visit  Clinically things are stable at home as well  Etiology of memory issues is likely multifactorial due to prior strokes and vascular risk factors (?early vascular dementia), as well as contribution from prior alcohol abuse, medication effects  He is maintaining ADLs without difficulty  Encouraged him to keep active mentally, physically and socially  We will continue to monitor over time

## 2022-04-05 ENCOUNTER — TELEPHONE (OUTPATIENT)
Dept: NEPHROLOGY | Facility: CLINIC | Age: 72
End: 2022-04-05

## 2022-04-05 ENCOUNTER — APPOINTMENT (OUTPATIENT)
Dept: LAB | Facility: HOSPITAL | Age: 72
End: 2022-04-05
Attending: INTERNAL MEDICINE
Payer: COMMERCIAL

## 2022-04-05 DIAGNOSIS — N28.9 RENAL INSUFFICIENCY: ICD-10-CM

## 2022-04-05 DIAGNOSIS — N18.9 CHRONIC RENAL IMPAIRMENT, UNSPECIFIED CKD STAGE: Primary | ICD-10-CM

## 2022-04-05 DIAGNOSIS — N18.9 CHRONIC RENAL IMPAIRMENT, UNSPECIFIED CKD STAGE: ICD-10-CM

## 2022-04-05 LAB
ANION GAP SERPL CALCULATED.3IONS-SCNC: 6 MMOL/L (ref 4–13)
BUN SERPL-MCNC: 42 MG/DL (ref 5–25)
CALCIUM SERPL-MCNC: 9.5 MG/DL (ref 8.3–10.1)
CHLORIDE SERPL-SCNC: 107 MMOL/L (ref 100–108)
CO2 SERPL-SCNC: 26 MMOL/L (ref 21–32)
CREAT SERPL-MCNC: 2.98 MG/DL (ref 0.6–1.3)
GFR SERPL CREATININE-BSD FRML MDRD: 19 ML/MIN/1.73SQ M
GLUCOSE P FAST SERPL-MCNC: 71 MG/DL (ref 65–99)
POTASSIUM SERPL-SCNC: 4.7 MMOL/L (ref 3.5–5.3)
SODIUM SERPL-SCNC: 139 MMOL/L (ref 136–145)

## 2022-04-05 PROCEDURE — 36415 COLL VENOUS BLD VENIPUNCTURE: CPT

## 2022-04-05 PROCEDURE — 80048 BASIC METABOLIC PNL TOTAL CA: CPT

## 2022-04-05 PROCEDURE — 3066F NEPHROPATHY DOC TX: CPT | Performed by: INTERNAL MEDICINE

## 2022-04-05 NOTE — TELEPHONE ENCOUNTER
Lab called for lab scripts to be placed in the system for patient  I spoke with Ronna Bee, Dr [facility] medical assistant who would put a BMP in  I made the lab aware

## 2022-04-05 NOTE — TELEPHONE ENCOUNTER
Appointment Confirmation   Person confirmed appointment with  If not patient, name of the person Patient     Date and time of appointment 04/07   Patient acknowledged and will be at appointment?  yes   Did you advise the patient that they will need a urine sample if they are a new patient? no   Did you advise the patient to bring their current medications for verification? (including any OTC) no   Additional Information

## 2022-04-06 ENCOUNTER — TELEPHONE (OUTPATIENT)
Dept: NEPHROLOGY | Facility: CLINIC | Age: 72
End: 2022-04-06

## 2022-04-06 NOTE — TELEPHONE ENCOUNTER
Called patient as requested he come in early for his appointment   Patient agreed and is coming in at 9 AM

## 2022-04-07 ENCOUNTER — OFFICE VISIT (OUTPATIENT)
Dept: NEPHROLOGY | Facility: CLINIC | Age: 72
End: 2022-04-07
Payer: COMMERCIAL

## 2022-04-07 ENCOUNTER — TELEPHONE (OUTPATIENT)
Dept: NEPHROLOGY | Facility: CLINIC | Age: 72
End: 2022-04-07

## 2022-04-07 VITALS
HEART RATE: 80 BPM | WEIGHT: 131.8 LBS | BODY MASS INDEX: 23.35 KG/M2 | HEIGHT: 63 IN | SYSTOLIC BLOOD PRESSURE: 120 MMHG | OXYGEN SATURATION: 98 % | DIASTOLIC BLOOD PRESSURE: 70 MMHG

## 2022-04-07 DIAGNOSIS — N18.9 CHRONIC KIDNEY DISEASE, UNSPECIFIED CKD STAGE: Primary | ICD-10-CM

## 2022-04-07 PROCEDURE — 3008F BODY MASS INDEX DOCD: CPT | Performed by: INTERNAL MEDICINE

## 2022-04-07 PROCEDURE — 1036F TOBACCO NON-USER: CPT | Performed by: INTERNAL MEDICINE

## 2022-04-07 PROCEDURE — 99214 OFFICE O/P EST MOD 30 MIN: CPT | Performed by: INTERNAL MEDICINE

## 2022-04-07 PROCEDURE — 1160F RVW MEDS BY RX/DR IN RCRD: CPT | Performed by: INTERNAL MEDICINE

## 2022-04-07 NOTE — PROGRESS NOTES
NEPHROLOGY PROGRESS NOTE    Jessy Lindo 74 y o  male MRN: [id number]  Unit/Bed#:  Encounter: [id number]  Reason for Consult:  Chronic kidney disease    The patient is here for routine follow-up and he looks great  He has had no intercurrent illnesses no health issues no hospitalizations or changes in medications  He is here with his wife and feels great with no complaints  They did tell me they received a call from [facility] regarding his kidney function and it just sounds like they were not really aware there that he was being seen for chronic kidney disease as he has been seen there in years  Otherwise no changes  ASSESSMENT/PLAN:  1  Renal    Patient's chronic kidney disease likely due to nephrosclerosis with potential chronic interstitial disease from tacrolimus use as he has a liver transplant about 20 years  There was no significant proteinuria when it was last checked so there has not really component of diabetic nephropathy here  At this point I told him we just need to monitor for progression with routine healthcare blood pressure control lipid control  Course he understands his tacrolimus is necessary to prevent rejection of his liver  He has no symptoms a renal disease no volume overload electrolytes are normal   We discussed potentially doing a kidney transplant referral and he states that he is interested so I will contact [facility] since that is where he had his liver transplant and they can do an evaluation and see if he would be a potential candidate  Hopefully he will have very slow progression as he has been having and again things are stable  2  Liver transplant    Patient is stable doing well monitors labs through [facility]  SUBJECTIVE:  Review of Systems   Constitutional: Negative for chills, fever, malaise/fatigue and night sweats  HENT: Negative  Eyes: Negative  Cardiovascular: Negative    Negative for chest pain, dyspnea on exertion, leg swelling and orthopnea  Respiratory: Negative  Negative for cough, shortness of breath, sputum production and wheezing  Gastrointestinal: Negative for abdominal pain, diarrhea, nausea and vomiting  Genitourinary: Negative for dysuria, flank pain, hematuria and incomplete emptying  Neurological: Negative for dizziness, focal weakness, headaches and weakness  Psychiatric/Behavioral: Negative for altered mental status, depression, hallucinations and hypervigilance  OBJECTIVE:  Current Weight: Weight - Scale: 59 8 kg (131 lb 12 8 oz)  [email or web address] com:     Blood pressure 120/70, pulse 80, height 5' 3" (1 6 m), weight 59 8 kg (131 lb 12 8 oz), SpO2 98 %  , Body mass index is 23 35 kg/m²  [unfilled]    Physical Exam: /70 (BP Location: Left arm, Patient Position: Sitting, Cuff Size: Standard)   Pulse 80   Ht 5' 3" (1 6 m)   Wt 59 8 kg (131 lb 12 8 oz)   SpO2 98%   BMI 23 35 kg/m²   Physical Exam  Constitutional:       General: He is not in acute distress  Appearance: He is not toxic-appearing or diaphoretic  HENT:      Head: Normocephalic and atraumatic  Nose:      Comments: Wearing mask     Mouth/Throat:      Comments: Wearing mask  Eyes:      General: No scleral icterus  Extraocular Movements: Extraocular movements intact  Cardiovascular:      Rate and Rhythm: Normal rate and regular rhythm  Heart sounds: No friction rub  No gallop  Comments: No edema  Pulmonary:      Effort: Pulmonary effort is normal  No respiratory distress  Breath sounds: Normal breath sounds  No wheezing, rhonchi or rales  Abdominal:      General: Bowel sounds are normal  There is no distension  Palpations: Abdomen is soft  Tenderness: There is no abdominal tenderness  There is no rebound  Musculoskeletal:      Cervical back: Normal range of motion and neck supple  Neurological:      General: No focal deficit present        Mental Status: He is alert and oriented to person, place, and time  Mental status is at baseline  Psychiatric:         Mood and Affect: Mood normal          Behavior: Behavior normal          Thought Content:  Thought content normal          Judgment: Judgment normal          Medications:    Current Outpatient Medications:     acetaminophen (TYLENOL) 500 mg tablet, Take 1 tablet (500 mg total) by mouth every 6 (six) hours as needed for mild pain, Disp: 30 tablet, Rfl: 0    clopidogrel (PLAVIX) 75 mg tablet, Take 1 tablet (75 mg total) by mouth daily, Disp: 90 tablet, Rfl: 3    insulin detemir (Levemir FlexTouch) 100 Units/mL injection pen, Inject 22 Units under the skin daily at bedtime, Disp: 15 mL, Rfl: 5    Insulin Pen Needle (Pen Needles) 32G X 4 MM MISC, Use daily at bedtime, Disp: 100 each, Rfl: 5    INSULIN SYRINGE 1CC/29G 29G X 1/2" 1 ML MISC, by Does not apply route, Disp: , Rfl:     Lancet Devices (Lancing Device) MISC, USE AS DIRECTED, Disp: 1 each, Rfl: 0    Lancets MISC, by Does not apply route, Disp: , Rfl:     OneTouch Ultra test strip, TEST UP TO 3 TIMES A DAY, Disp: 100 each, Rfl: 0    pregabalin (LYRICA) 50 mg capsule, TAKE 1 CAPSULE (50 MG TOTAL) BY MOUTH 3 (THREE) TIMES A DAY, Disp: 90 capsule, Rfl: 5    rosuvastatin (CRESTOR) 40 MG tablet, TAKE ONE (1) TABLET BY MOUTH DAILY, Disp: 30 tablet, Rfl: 5    tacrolimus (PROGRAF) 1 mg capsule, Take 1 capsule (1 mg total) by mouth every 12 (twelve) hours, Disp: 180 capsule, Rfl: 3    tamsulosin (FLOMAX) 0 4 mg, TAKE 1 CAPSULE(0 4 MG) BY MOUTH DAILY WITH DINNER, Disp: 90 capsule, Rfl: 6    temazepam (RESTORIL) 30 mg capsule, TAKE 1 CAPSULE (30 MG TOTAL) BY MOUTH DAILY AT BEDTIME, Disp: 30 capsule, Rfl: 5    traMADol (ULTRAM) 50 mg tablet, TAKE 1 TABLET (50 MG TOTAL) BY MOUTH EVERY 8 (EIGHT) HOURS AS NEEDED FOR MODERATE PAIN, Disp: 180 tablet, Rfl: 0    triamcinolone (KENALOG) 0 1 % cream, APPLY TO AFFECTED AREA TWICE DAILY, Disp: 80 g, Rfl: 5    zolpidem (AMBIEN) 10 mg tablet, Take 1 tablet (10 mg total) by mouth daily at bedtime, Disp: 30 tablet, Rfl: 5    Cetirizine HCl (ZYRTEC ALLERGY) 10 MG CAPS, Take 1 tablet by mouth daily as needed (Patient not taking: Reported on 3/23/2022 ), Disp: , Rfl:     Multiple Vitamin tablet, Take by mouth (Patient not taking: Reported on 3/23/2022 ), Disp: , Rfl:     Laboratory Results:  Lab Results   Component Value Date    WBC 7 77 02/10/2022    HGB 11 5 (L) 02/10/2022    HCT 37 8 02/10/2022    MCV 79 (L) 02/10/2022     (L) 02/10/2022     Lab Results   Component Value Date    SODIUM 139 04/05/2022    K 4 7 04/05/2022     04/05/2022    CO2 26 04/05/2022    BUN 42 (H) 04/05/2022    CREATININE 2 98 (H) 04/05/2022    GLUC 162 (H) 07/26/2021    CALCIUM 9 5 04/05/2022     Lab Results   Component Value Date    CALCIUM 9 5 04/05/2022    PHOS 3 8 02/10/2022     No results found for: LABPROT

## 2022-04-07 NOTE — PATIENT INSTRUCTIONS
For follow-up you look great and it sounds like her health is doing well you have good energy your eating well and have no complaints for me  Your creatinine level was 2 9 which is about the same as it was over the last year  You will have symptoms related to this  There was no protein in the urine so it is not from diabetic kidney disease is likely from aging and potentially related to liver transplant and the medications  At this point all medications are required her doing great things are stable blood pressure is excellent  We discussed potentially being evaluated for kidney transplant as that may be required in the future and you are interested because you had her liver transplant at [facility] I will contact them to get in touch to set up an appointment for an evaluation  Labs and follow-up as scheduled  Please call if you have any questions or worries before next visit

## 2022-04-07 NOTE — LETTER
April 7, 2022     Michelle Teri, [address] [facility]  [address] [place] [id number]    Patient: Susy Diallo   YOB: 1950   Date of Visit: 4/7/2022       Dear Dr Keely Welsh:    Thank you for referring Susy Diallo to me for evaluation  Below are my notes for this consultation  If you have questions, please do not hesitate to call me  I look forward to following your patient along with you  Sincerely,        Tony Bruno MD        CC: Manoj Lozano, Bill Ramirez MD  4/7/2022  9:07 AM  Sign when Signing Visit  NEPHROLOGY PROGRESS NOTE    Susy Diallo 74 y o  male MRN: [id number]  Unit/Bed#:  Encounter: [id number]  Reason for Consult:  Chronic kidney disease    The patient is here for routine follow-up and he looks great  He has had no intercurrent illnesses no health issues no hospitalizations or changes in medications  He is here with his wife and feels great with no complaints  They did tell me they received a call from [facility] regarding his kidney function and it just sounds like they were not really aware there that he was being seen for chronic kidney disease as he has been seen there in years  Otherwise no changes  ASSESSMENT/PLAN:  1  Renal    Patient's chronic kidney disease likely due to nephrosclerosis with potential chronic interstitial disease from tacrolimus use as he has a liver transplant about 20 years  There was no significant proteinuria when it was last checked so there has not really component of diabetic nephropathy here  At this point I told him we just need to monitor for progression with routine healthcare blood pressure control lipid control  Course he understands his tacrolimus is necessary to prevent rejection of his liver    He has no symptoms a renal disease no volume overload electrolytes are normal   We discussed potentially doing a kidney transplant referral and he states that he is interested so I will contact [facility] since that is where he had his liver transplant and they can do an evaluation and see if he would be a potential candidate  Hopefully he will have very slow progression as he has been having and again things are stable  2  Liver transplant    Patient is stable doing well monitors labs through [facility]  SUBJECTIVE:  Review of Systems   Constitutional: Negative for chills, fever, malaise/fatigue and night sweats  HENT: Negative  Eyes: Negative  Cardiovascular: Negative  Negative for chest pain, dyspnea on exertion, leg swelling and orthopnea  Respiratory: Negative  Negative for cough, shortness of breath, sputum production and wheezing  Gastrointestinal: Negative for abdominal pain, diarrhea, nausea and vomiting  Genitourinary: Negative for dysuria, flank pain, hematuria and incomplete emptying  Neurological: Negative for dizziness, focal weakness, headaches and weakness  Psychiatric/Behavioral: Negative for altered mental status, depression, hallucinations and hypervigilance  OBJECTIVE:  Current Weight: Weight - Scale: 59 8 kg (131 lb 12 8 oz)  [email or web address] com:     Blood pressure 120/70, pulse 80, height 5' 3" (1 6 m), weight 59 8 kg (131 lb 12 8 oz), SpO2 98 %  , Body mass index is 23 35 kg/m²  [unfilled]    Physical Exam: /70 (BP Location: Left arm, Patient Position: Sitting, Cuff Size: Standard)   Pulse 80   Ht 5' 3" (1 6 m)   Wt 59 8 kg (131 lb 12 8 oz)   SpO2 98%   BMI 23 35 kg/m²   Physical Exam  Constitutional:       General: He is not in acute distress  Appearance: He is not toxic-appearing or diaphoretic  HENT:      Head: Normocephalic and atraumatic  Nose:      Comments: Wearing mask     Mouth/Throat:      Comments: Wearing mask  Eyes:      General: No scleral icterus  Extraocular Movements: Extraocular movements intact  Cardiovascular:      Rate and Rhythm: Normal rate and regular rhythm  Heart sounds:  No friction rub  No gallop  Comments: No edema  Pulmonary:      Effort: Pulmonary effort is normal  No respiratory distress  Breath sounds: Normal breath sounds  No wheezing, rhonchi or rales  Abdominal:      General: Bowel sounds are normal  There is no distension  Palpations: Abdomen is soft  Tenderness: There is no abdominal tenderness  There is no rebound  Musculoskeletal:      Cervical back: Normal range of motion and neck supple  Neurological:      General: No focal deficit present  Mental Status: He is alert and oriented to person, place, and time  Mental status is at baseline  Psychiatric:         Mood and Affect: Mood normal          Behavior: Behavior normal          Thought Content:  Thought content normal          Judgment: Judgment normal          Medications:    Current Outpatient Medications:     acetaminophen (TYLENOL) 500 mg tablet, Take 1 tablet (500 mg total) by mouth every 6 (six) hours as needed for mild pain, Disp: 30 tablet, Rfl: 0    clopidogrel (PLAVIX) 75 mg tablet, Take 1 tablet (75 mg total) by mouth daily, Disp: 90 tablet, Rfl: 3    insulin detemir (Levemir FlexTouch) 100 Units/mL injection pen, Inject 22 Units under the skin daily at bedtime, Disp: 15 mL, Rfl: 5    Insulin Pen Needle (Pen Needles) 32G X 4 MM MISC, Use daily at bedtime, Disp: 100 each, Rfl: 5    INSULIN SYRINGE 1CC/29G 29G X 1/2" 1 ML MISC, by Does not apply route, Disp: , Rfl:     Lancet Devices (Lancing Device) MISC, USE AS DIRECTED, Disp: 1 each, Rfl: 0    Lancets MISC, by Does not apply route, Disp: , Rfl:     OneTouch Ultra test strip, TEST UP TO 3 TIMES A DAY, Disp: 100 each, Rfl: 0    pregabalin (LYRICA) 50 mg capsule, TAKE 1 CAPSULE (50 MG TOTAL) BY MOUTH 3 (THREE) TIMES A DAY, Disp: 90 capsule, Rfl: 5    rosuvastatin (CRESTOR) 40 MG tablet, TAKE ONE (1) TABLET BY MOUTH DAILY, Disp: 30 tablet, Rfl: 5    tacrolimus (PROGRAF) 1 mg capsule, Take 1 capsule (1 mg total) by mouth every 12 (twelve) hours, Disp: 180 capsule, Rfl: 3    tamsulosin (FLOMAX) 0 4 mg, TAKE 1 CAPSULE(0 4 MG) BY MOUTH DAILY WITH DINNER, Disp: 90 capsule, Rfl: 6    temazepam (RESTORIL) 30 mg capsule, TAKE 1 CAPSULE (30 MG TOTAL) BY MOUTH DAILY AT BEDTIME, Disp: 30 capsule, Rfl: 5    traMADol (ULTRAM) 50 mg tablet, TAKE 1 TABLET (50 MG TOTAL) BY MOUTH EVERY 8 (EIGHT) HOURS AS NEEDED FOR MODERATE PAIN, Disp: 180 tablet, Rfl: 0    triamcinolone (KENALOG) 0 1 % cream, APPLY TO AFFECTED AREA TWICE DAILY, Disp: 80 g, Rfl: 5    zolpidem (AMBIEN) 10 mg tablet, Take 1 tablet (10 mg total) by mouth daily at bedtime, Disp: 30 tablet, Rfl: 5    Cetirizine HCl (ZYRTEC ALLERGY) 10 MG CAPS, Take 1 tablet by mouth daily as needed (Patient not taking: Reported on 3/23/2022 ), Disp: , Rfl:     Multiple Vitamin tablet, Take by mouth (Patient not taking: Reported on 3/23/2022 ), Disp: , Rfl:     Laboratory Results:  Lab Results   Component Value Date    WBC 7 77 02/10/2022    HGB 11 5 (L) 02/10/2022    HCT 37 8 02/10/2022    MCV 79 (L) 02/10/2022     (L) 02/10/2022     Lab Results   Component Value Date    SODIUM 139 04/05/2022    K 4 7 04/05/2022     04/05/2022    CO2 26 04/05/2022    BUN 42 (H) 04/05/2022    CREATININE 2 98 (H) 04/05/2022    GLUC 162 (H) 07/26/2021    CALCIUM 9 5 04/05/2022     Lab Results   Component Value Date    CALCIUM 9 5 04/05/2022    PHOS 3 8 02/10/2022     No results found for: LABPROT

## 2022-04-07 NOTE — TELEPHONE ENCOUNTER
----- Message from Tony Bruno MD sent at 4/7/2022  9:09 AM EDT -----  Please call [address] [place] [address] kidney transplant office and tell them I would like to refer this patient for kidney transplant evaluation  Ask if they could contact the patient for an appointment and the patient is aware  Tell him he does have a history of liver transplantation at the [address] [place] [address] is doing very well with that  Thank you keep me posted

## 2022-04-11 NOTE — TELEPHONE ENCOUNTER
I spoke to [place] from [place] and she will be routing intake to schedulers for transplant eval

## 2022-04-18 DIAGNOSIS — E11.40 CONTROLLED TYPE 2 DIABETES MELLITUS WITH DIABETIC NEUROPATHY, WITH LONG-TERM CURRENT USE OF INSULIN (HCC): ICD-10-CM

## 2022-04-18 DIAGNOSIS — Z79.4 CONTROLLED TYPE 2 DIABETES MELLITUS WITH DIABETIC NEUROPATHY, WITH LONG-TERM CURRENT USE OF INSULIN (HCC): ICD-10-CM

## 2022-04-18 RX ORDER — LANCING DEVICE
EACH MISCELLANEOUS
Qty: 1 EACH | Refills: 0 | Status: SHIPPED | OUTPATIENT
Start: 2022-04-18 | End: 2022-07-14

## 2022-04-19 ENCOUNTER — TELEPHONE (OUTPATIENT)
Dept: OTHER | Facility: OTHER | Age: 72
End: 2022-04-19

## 2022-04-19 ENCOUNTER — NURSE TRIAGE (OUTPATIENT)
Dept: OTHER | Facility: OTHER | Age: 72
End: 2022-04-19

## 2022-04-19 NOTE — TELEPHONE ENCOUNTER
Pt's wife called in stating zolpidem (AMBIEN) 10 mg tablet [[id number]] is not working  Can he try Ambien CR? She is requesting a call back

## 2022-04-19 NOTE — TELEPHONE ENCOUNTER
Unable to reach patient  Please call to advise  Reason for Disposition   Third attempt to contact caller AND no contact made  Phone number verified      Protocols used: NO CONTACT OR DUPLICATE CONTACT CALL-ADULT-[place]

## 2022-04-19 NOTE — TELEPHONE ENCOUNTER
Left message for patient in the past he had zyrtec if he would like to try it again he can get it OTC

## 2022-04-19 NOTE — TELEPHONE ENCOUNTER
Regarding: Sneezing due to allergies  ----- Message from Beltran Nance sent at 4/19/2022 12:11 PM EDT -----  "He's been sneezing a lot due to allergies  He can't take Benadryl because he'll have a reaction   Can something else be prescribed?"

## 2022-04-19 NOTE — TELEPHONE ENCOUNTER
She would need to see if it would be covered by his insurance   I don't find that it works much better for most people

## 2022-04-27 ENCOUNTER — ANESTHESIA EVENT (OUTPATIENT)
Dept: GASTROENTEROLOGY | Facility: HOSPITAL | Age: 72
End: 2022-04-27

## 2022-04-27 ENCOUNTER — ANESTHESIA (OUTPATIENT)
Dept: GASTROENTEROLOGY | Facility: HOSPITAL | Age: 72
End: 2022-04-27

## 2022-04-27 ENCOUNTER — HOSPITAL ENCOUNTER (OUTPATIENT)
Dept: GASTROENTEROLOGY | Facility: HOSPITAL | Age: 72
Setting detail: OUTPATIENT SURGERY
Discharge: HOME | End: 2022-04-27
Attending: INTERNAL MEDICINE
Payer: COMMERCIAL

## 2022-04-27 VITALS
RESPIRATION RATE: 16 BRPM | DIASTOLIC BLOOD PRESSURE: 72 MMHG | BODY MASS INDEX: 23.21 KG/M2 | HEART RATE: 72 BPM | WEIGHT: 131 LBS | SYSTOLIC BLOOD PRESSURE: 120 MMHG | OXYGEN SATURATION: 100 % | TEMPERATURE: 97.5 F | HEIGHT: 63 IN

## 2022-04-27 DIAGNOSIS — Z86.010 HISTORY OF COLON POLYPS: ICD-10-CM

## 2022-04-27 LAB — GLUCOSE SERPL-MCNC: 122 MG/DL (ref 65–140)

## 2022-04-27 PROCEDURE — 82948 REAGENT STRIP/BLOOD GLUCOSE: CPT

## 2022-04-27 RX ORDER — PROPOFOL 10 MG/ML
INJECTION, EMULSION INTRAVENOUS AS NEEDED
Status: DISCONTINUED | OUTPATIENT
Start: 2022-04-27 | End: 2022-04-27

## 2022-04-27 RX ORDER — SODIUM CHLORIDE 9 MG/ML
INJECTION, SOLUTION INTRAVENOUS CONTINUOUS PRN
Status: DISCONTINUED | OUTPATIENT
Start: 2022-04-27 | End: 2022-04-27

## 2022-04-27 RX ADMIN — SODIUM CHLORIDE: 9 INJECTION, SOLUTION INTRAVENOUS at 13:10

## 2022-04-27 RX ADMIN — PROPOFOL 100 MG: 10 INJECTION, EMULSION INTRAVENOUS at 13:26

## 2022-04-27 NOTE — ANESTHESIA PREPROCEDURE EVALUATION
Procedure:  COLONOSCOPY    Relevant Problems   CARDIO   (+) Hyperlipidemia   (+) Migraine headache   (+) Type 2 diabetes mellitus with diabetic peripheral angiopathy without gangrene, with long-term current use of insulin (HCC)      ENDO   (+) Type 2 diabetes mellitus with chronic kidney disease (HCC)   (+) Type 2 diabetes mellitus with diabetic peripheral angiopathy without gangrene, with long-term current use of insulin (HCC)      GI/HEPATIC   (+) 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HCC)      /RENAL   (+) CKD (chronic kidney disease)   (+) Stage 4 chronic kidney disease (HCC)      HEMATOLOGY   (+) Thrombocytopenia (HCC)      MUSCULOSKELETAL   (+) Spondylosis of cervical region without myelopathy or radiculopathy      NEURO/PSYCH   (+) Headache, chronic daily   (+) History of CVA (cerebrovascular accident)   (+) History of cirrhosis   (+) History of hepatitis C   (+) Migraine headache             Anesthesia Plan  ASA Score- 3     Anesthesia Type- IV sedation with anesthesia with ASA Monitors  Additional Monitors:   Airway Plan:           Plan Factors-    Chart reviewed  Induction- intravenous  Postoperative Plan-     Informed Consent- Anesthetic plan and risks discussed with patient  I personally reviewed this patient with the CRNA  Discussed and agreed on the Anesthesia Plan with the CRNA  Danita Wills

## 2022-04-27 NOTE — H&P
History and Physical -  [facility]  Hanane Houser 74 y o  male MRN: [id number]                  HPI: Hanane Houser is a 74y o  year old male who presents for screening colonoscopy  REVIEW OF SYSTEMS: Per the HPI, and otherwise unremarkable      Historical Information   Past Medical History:   Diagnosis Date    Alcoholism ([facility] )     Cerebral artery aneurysm     Change in mental state     last assessed 5/18/15; resolved 10/27/15    Diabetes mellitus ([facility] )     Drug dependence ([facility] )     Fatigue     last assessed 1/26/15; resolved 5/24/16    Hepatitis [facility] discharge follow-up 12/21/2018    Kidney disease     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[facility] )     [facility])     Renal disorder     Subdural hygroma     2/27/14; resolved 7/28/15    [facility]ocyt[facility] ) 9/20/2017     Past Surgical History:   Procedure Laterality Date    BRAIN SURGERY  02/12/2014    left frontotemporal 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    CATARACT EXTRACTION      CHOLECYSTECTOMY      FL LUMBAR PUNCTURE DIAGNOSTIC  12/14/2018    IR PICC LINE  12/14/2018    LIVER TRANSPLANTATION      ROTATOR CUFF REPAIR      SHOULDER SURGERY       Social History   Social History     Substance and Sexual Activity   Alcohol Use No     Social History     Substance and Sexual Activity   Drug Use No    Comment: remotely quit drug use per [employer]scripts      Social History     Tobacco Use   Smoking Status Never Smoker   Smokeless Tobacco Never Used   Tobacco Comment    former smoker per [employer]scripts      Family History   Problem Relation Age of Onset    Hypertension Mother         benign essential     Lung cancer Father     Diabetes Sister     Cancer Brother     Stroke Other         cva - due to embolism of cerebra artery        Meds/Allergies       Current Outpatient Medications:     clopidogrel (PLAVIX) 75 mg tablet    insulin detemir (Levemir FlexTouch) 100 Units/mL injection pen    Insulin Pen Needle (Pen Needles) 32G X 4 MM MISC    INSULIN SYRINGE 1CC/29G 29G X 1/2" 1 ML MISC    Lancet Devices (Lancing Device) MISC    Lancets MISC    OneTouch Ultra test strip    pregabalin (LYRICA) 50 mg capsule    rosuvastatin (CRESTOR) 40 MG tablet    tacrolimus (PROGRAF) 1 mg capsule    tamsulosin (FLOMAX) 0 4 mg    temazepam (RESTORIL) 30 mg capsule    traMADol (ULTRAM) 50 mg tablet    zolpidem (AMBIEN) 10 mg tablet    acetaminophen (TYLENOL) 500 mg tablet    triamcinolone (KENALOG) 0 1 % cream    Allergies   Allergen Reactions    Tequin [Gatifloxacin] Rash    Ciprofloxacin     Iv Contrast [Iodinated Diagnostic Agents]     Levofloxacin Rash    Lipitor [Atorvastatin] Rash     Rash and itching    Omnipaque [Iohexol]        Objective     /76   Pulse 75   Temp (!) 97 4 °F (36 3 °C)   Resp 18   Ht 5' 3" (1 6 m)   Wt 59 4 kg (131 lb)   SpO2 100%   BMI 23 21 kg/m²       PHYSICAL EXAM    Gen: NAD  Head: NCAT  CV: RRR  CHEST: Clear  ABD: soft, NT/ND  EXT: no edema      ASSESSMENT/PLAN:  This is a 74y o  year old male here for screening colonoscopy, and he is stable and optimized for his procedure

## 2022-05-09 ENCOUNTER — NURSE TRIAGE (OUTPATIENT)
Dept: OTHER | Facility: OTHER | Age: 72
End: 2022-05-09

## 2022-05-09 NOTE — TELEPHONE ENCOUNTER
Reason for Disposition   [1] SEVERE pain (e g , excruciating, unable to use hand at all) AND [2] not improved after 2 hours of pain medicine    Answer Assessment - Initial Assessment Questions  1  ONSET: "When did the pain start?"      About 3 days ago  2  LOCATION: "Where is the pain located?"      Left hand  3  PAIN: "How bad is the pain?" (Scale 1-10; or mild, moderate, severe)    - MILD (1-3): doesn't interfere with normal activities    - MODERATE (4-7): interferes with normal activities (e g , work or school) or awakens from sleep    - SEVERE (8-10): excruciating pain, unable to use hand at all      severe  4  WORK OR EXERCISE: "Has there been any recent work or exercise that involved this part of the body?"      denies  5  CAUSE: "What do you think is causing the pain?"      unsure  6  AGGRAVATING FACTORS: "What makes the pain worse?" (e g , using computer)      Reports pain is consistent  7  OTHER SYMPTOMS: "Do you have any other symptoms?" (e g , neck pain, swelling, rash, numbness, fever)      Denies swelling  8   PREGNANCY: "Is there any chance you are pregnant?" "When was your last menstrual period?"      no    Protocols used: HAND AND WRIST PAIN-ADULT-

## 2022-05-09 NOTE — TELEPHONE ENCOUNTER
Daughter who was in the presence of Ric Velez reports he has been complaining of severe left hand pain for about 3 days  Pain radiates up to his elbow  Rates pain as a 10/10  Notes NSAIDS have not relieved any pain  Denies any trauma or injury  Notes Ric Velez does not want to be seen, only requesting medication to be sent to pharmacy  I advised patient needs to be evaluated either in the ER this evening for severe pain, but if insistent can follow up with PCP office in the morning  Daughter verbalized understanding

## 2022-05-09 NOTE — TELEPHONE ENCOUNTER
Regarding: pain/ left arm and hand  ----- Message from Tania Espinoza sent at 5/9/2022  4:17 PM EDT -----  Pt's daughter called, requesting medical advice, " my dad has been in pain for three days  He has pain on his left hand that  Does up his arm   He tried tylenol and ibuprofen, but it is not helping "

## 2022-05-10 DIAGNOSIS — E13.40 NEUROPATHY DUE TO SECONDARY DIABETES MELLITUS (HCC): ICD-10-CM

## 2022-05-11 ENCOUNTER — HOSPITAL ENCOUNTER (EMERGENCY)
Facility: HOSPITAL | Age: 72
Discharge: HOME | End: 2022-05-11
Attending: EMERGENCY MEDICINE
Payer: COMMERCIAL

## 2022-05-11 ENCOUNTER — APPOINTMENT (EMERGENCY)
Dept: RADIOLOGY | Facility: HOSPITAL | Age: 72
End: 2022-05-11
Payer: COMMERCIAL

## 2022-05-11 VITALS
DIASTOLIC BLOOD PRESSURE: 89 MMHG | RESPIRATION RATE: 16 BRPM | HEART RATE: 72 BPM | SYSTOLIC BLOOD PRESSURE: 151 MMHG | OXYGEN SATURATION: 100 %
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
## 2022-05-11 DIAGNOSIS — S69.90XA WRIST INJURY: ICD-10-CM

## 2022-05-11 PROCEDURE — 73110 X-RAY EXAM OF WRIST: CPT

## 2022-05-11 PROCEDURE — 72125 CT NECK SPINE W/O DYE: CPT

## 2022-05-11 PROCEDURE — 99284 EMERGENCY DEPT VISIT MOD MDM: CPT

## 2022-05-11 PROCEDURE — 99284 EMERGENCY DEPT VISIT MOD MDM: CPT | Performed by: EMERGENCY MEDICINE

## 2022-05-11 PROCEDURE — G1004 CDSM NDSC: HCPCS

## 2022-05-11 PROCEDURE — 73090 X-RAY EXAM OF FOREARM: CPT

## 2022-05-11 PROCEDURE — 70450 CT HEAD/BRAIN W/O DYE: CPT

## 2022-05-11 RX ORDER — TRAMADOL HYDROCHLORIDE 50 MG/1
50 TABLET ORAL EVERY 8 HOURS PRN
Qty: 180 TABLET | Refills: 0 | Status: SHIPPED | OUTPATIENT
Start: 2022-05-11 | End: 2022-05-30

## 2022-05-11 NOTE — ED ATTENDING ATTESTATION
5/11/2022  IMatt DO, saw and evaluated the patient  I have discussed the patient with the resident/non-physician practitioner and agree with the resident's/non-physician practitioner's findings, Plan of Care, and MDM as documented in the resident's/non-physician practitioner's note, except where noted  All available labs and Radiology studies were reviewed  I was present for key portions of any procedure(s) performed by the resident/non-physician practitioner and I was immediately available to provide assistance  At this point I agree with the current assessment done in the Emergency Department  I have conducted an independent evaluation of this patient a history and physical is as follows:    Patient is a 64-year-old male accompanied by his wife  Computer indicates preferred language is [place] the patient says he speaks [place] well and does not need a   Four days ago he was mowing the lawn when he slipped and fell, falling backwards, hitting his buttocks and his outstretched left hand  Thinks he may have hit the back part of his head on a wooden fence, no headache, no loss of consciousness  Since then been having some mild pain in his distal left arm  No numbness or tingling, hurts when he tries to  things  No chest pain, no palpitation, no difficulty speaking, thinking, or concentrating  General:  Patient is well-appearing  Head:  Atraumatic  Eyes:  Conjunctiva pink, Extraocular muscle intact, no periorbital ecchymosis, PERRL  ENT:  Mucous membranes are moist, no dental malocclusion, no craniofacial instability, no Johnson signs  Neck:  No midline tenderness or step-offs or deformities  Cardiac:  S1-S2, without murmurs, no chest wall tenderness  Lungs:  Clear to auscultation bilaterally  Abdomen:  Soft, nontender, normal bowel sounds, no CVA tenderness, no tympany, no rigidity, no guarding  Extremities:  Patient's left arm is visibly atraumatic    He has some mild tenderness to the distal ulna and mid ulna, slight mid radial tenderness  There is no snuffbox tenderness, there is no tenderness to the humeral head, humerus or elbow, no pain with passive range of motion at the left shoulder or elbow  Slight discomfort with the wrist but there is no bony tenderness at the snuffbox or hand  The patient can flex and extend well at the MCP, DIP and PIP joints in all 4 fingers of the left hand and the MCP and the IP joint in the left thumb  There are no sensory deficits in the hand, including over the thumb or palm of the hand  The other 3 extremities are atraumatic, nontender with normal, painless range of motion  Back: No midline thoracic, lumbar, sacral tenderness, deformities, or step-offs  Neurologic:  Awake, fluent speech, normal comprehension, AAOx3, No deficit on finger to nose testing, no pronator drift, cranial nerves II through XII are intact, no facial droop, no slurred speech, normal sensation, strength 5/5 in b/l upper & lower extremities  Skin:  Pink warm and dry  Psychiatric:  Alert, pleasant, cooperative      ED Course     CT head wo contrast   Final Result      No acute intracranial abnormality  Workstation performed: [id number]         CT spine cervical without contrast   Final Result      No cervical spine fracture or traumatic malalignment  Workstation performed: [id number]         XR forearm 2 views LEFT   ED Interpretation   X-ray interpreted me shows no fracture, no acute pathology      Final Result      No acute osseous abnormality  Workstation performed: [id number]         XR wrist 3+ views LEFT   ED Interpretation   Left wrist x-ray interpreted me shows no fracture, no dislocation, no acute pathology      Final Result      No acute osseous abnormality              Workstation performed: [id number]             On reassessment, patient feeling comfortable,Supportive care, importance of follow-up and return precautions were discussed with patient and wife, who expressed understanding        Critical Care Time  Procedures

## 2022-05-11 NOTE — CASE MANAGEMENT
Case Management ED Discharge Planning Note    Patient name Aster Galvez  Location ED 27/ED 27 MRN [id number]  : 1950 Date 2022        OBJECTIVE:  Predictive Model Details         16% Factor Value    Risk of Hospital Admission or ED Visit Model Is in Relationship Yes     Number of ED Visits 2     Has Chronic Kidney Disease Yes     Has Peripheral Vascular Disease Yes     Has CVD Yes     Has Diabetes Yes     Has Chronic Liver Disease Yes     Has PCP Yes          Chief Complaint: Wrist pain   Patient Class: Emergency  Preferred Pharmacy:   [address] [place] [id number]  Phone: [phone] Fax: [phone]    Primary Care Provider: Irene Gill DO    Primary Insurance: [facility]  Secondary Insurance:     ED Discharge Details:    Discharge planning discussed with[de-identified] Patient and SO, Zeus Santo of Choice: Yes     CM contacted family/caregiver?: Yes  Were Treatment Team discharge recommendations reviewed with patient/caregiver?: Yes  Did patient/caregiver verbalize understanding of patient care needs?: Yes  Were patient/caregiver advised of the risks associated with not following Treatment Team discharge recommendations?: Yes    Contacts  Patient Contacts: Bassem PLATA  Relationship to Patient[de-identified] Family  Contact Method: In Person  Reason/Outcome: Continuity of Care, Emergency Contact, Discharge Planning    Other Referral/Resources/Interventions Provided:  Government Services[de-identified] [facility]    Treatment Team Recommendation: Home  Discharge Destination Plan[de-identified] Home     Transport at Discharge : Family      Additional Comments: CM met with pt at bedside due to c/s for ISAR>=3  Pt and SO were able to complete Open assessment without difficulty    At the end of the assessment pt's SO, Rakan, became tearful stating that she was getting overwhelmed at times with care for pt and for her 80year old mother who also lives with them  CM spoke to pt and family about care options and provided Larri Mohs with informtion for the [facility]  CM encouraged Larri Mohs to call to discuss home options and be assessed for services

## 2022-05-11 NOTE — ED PROVIDER NOTES
History  Chief Complaint   Patient presents with    Wrist Injury     Patient fell 4 days ago, +thinners, +headstrike  Now complains of continuous arm/wrist pain     15-year-old male history of alcohol abuse, liver transplant, diabetes, presenting due to fall  Patient states around 4 days ago he was mowing the lawn when he slipped and fell backwards onto his left hand  Estephanie Nagel he believes he may have hit the side of his head on the fence on the fall down but denies any head strike on the ground denies any loss of consciousness  His daughter needed to assistance to stand up but since then he has been ambulatory without any pain in his legs or difficulty walking  Denies any headache or neck pain at that time or since then  Says he has had some persistent pain in his left forearm since the fall  Has been taking Tylenol with minimal relief  Says the pain is worse when he attempts to use his left arm  Denies any bleeding or bruising at the site or obvious deformity  Denies any pain in the elbow or shoulder denies any numbness or tingling in the extremity  Prior to Admission Medications   Prescriptions Last Dose Informant Patient Reported? Taking?    INSULIN SYRINGE 1CC/29G 29G X 1/2" 1 ML MISC  Self Yes No   Sig: by Does not apply route   Insulin Pen Needle (Pen Needles) 32G X 4 MM MISC   No No   Sig: Use daily at bedtime   Lancet Devices (Lancing Device) MISC   No No   Sig: USE AS DIRECTED   Lancets MISC  Self Yes No   Sig: by Does not apply route   OneTouch Ultra test strip   No No   Sig: TEST UP TO 3 TIMES A DAY   acetaminophen (TYLENOL) 500 mg tablet   No No   Sig: Take 1 tablet (500 mg total) by mouth every 6 (six) hours as needed for mild pain   clopidogrel (PLAVIX) 75 mg tablet   No No   Sig: Take 1 tablet (75 mg total) by mouth daily   insulin detemir (Levemir FlexTouch) 100 Units/mL injection pen   No No   Sig: Inject 22 Units under the skin daily at bedtime   pregabalin (LYRICA) 50 mg capsule No No   Sig: TAKE 1 CAPSULE (50 MG TOTAL) BY MOUTH 3 (THREE) TIMES A DAY   rosuvastatin (CRESTOR) 40 MG tablet   No No   Sig: TAKE ONE (1) TABLET BY MOUTH DAILY   tacrolimus (PROGRAF) 1 mg capsule   No No   Sig: Take 1 capsule (1 mg total) by mouth every 12 (twelve) hours   tamsulosin (FLOMAX) 0 4 mg  Self No No   Sig: TAKE 1 CAPSULE(0 4 MG) BY MOUTH DAILY WITH DINNER   temazepam (RESTORIL) 30 mg capsule   No No   Sig: TAKE 1 CAPSULE (30 MG TOTAL) BY MOUTH DAILY AT BEDTIME   traMADol (ULTRAM) 50 mg tablet   No No   Sig: TAKE 1 TABLET (50 MG TOTAL) BY MOUTH EVERY 8 (EIGHT) HOURS AS NEEDED FOR MODERATE PAIN   triamcinolone (KENALOG) 0 1 % cream   No No   Sig: APPLY TO AFFECTED AREA TWICE DAILY   zolpidem (AMBIEN) 10 mg tablet   No No   Sig: Take 1 tablet (10 mg total) by mouth daily at bedtime      Facility-Administered Medications: None       Past Medical History:   Diagnosis Date    [facility]     Cerebral artery aneurysm     Change in mental state     last assessed 5/18/15; resolved 10/27/15    Diabetes [facility]     Drug dependence [facility]     Fatigue     last assessed 1/26/15; resolved 5/24/16    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    CATARACT EXTRACTION      CHOLECYSTECTOMY      FL LUMBAR PUNCTURE DIAGNOSTIC  12/14/2018    IR PICC LINE  12/14/2018    LIVER TRANSPLANTATION      ROTATOR CUFF REPAIR      SHOULDER SURGERY         Family History   Problem Relation Age of Onset    Hypertension Mother         benign essential     Lung cancer Father     Diabetes Sister     Cancer Brother     Stroke Other         cva - due to embolism of cerebra artery      I have reviewed and agree with the history as documented  E-Cigarette/Vaping    E-Cigarette Use Never User      E-Cigarette/Vaping Substances    Nicotine No     THC No     CBD No     Flavoring No     Other No     Unknown No      Social History     Tobacco Use    Smoking status: Never Smoker    Smokeless tobacco: Never Used    Tobacco comment: former smoker per allscripts    Vaping Use    Vaping Use: Never used   Substance Use Topics    Alcohol use: No    Drug use: No     Comment: remotely quit drug use per allscripts         Review of Systems   Constitutional: Negative for fatigue and fever  HENT: Negative for congestion and trouble swallowing  Eyes: Negative for visual disturbance  Respiratory: Negative for cough, chest tightness and shortness of breath  Cardiovascular: Negative for chest pain  Gastrointestinal: Negative for abdominal pain, diarrhea, nausea and vomiting  Genitourinary: Negative for flank pain  Musculoskeletal: Positive for arthralgias and myalgias  Negative for back pain and gait problem  Skin: Negative for rash and wound  Neurological: Negative for syncope and headaches  Psychiatric/Behavioral: Negative for agitation  All other systems reviewed and are negative  Physical Exam  ED Triage Vitals [05/11/22 0625]   Temp Pulse Respirations Blood Pressure SpO2   -- 82 16 142/90 99 %      Temp src Heart Rate Source Patient Position - Orthostatic VS BP Location FiO2 (%)   -- Monitor -- Right arm --      Pain Score       --             Orthostatic Vital Signs  Vitals:    05/11/22 0625 05/11/22 0719   BP: 142/90 151/89   Pulse: 82 72       Physical Exam  Vitals and nursing note reviewed  Constitutional:       Appearance: He is well-developed  He is not diaphoretic  HENT:      Head: Normocephalic and atraumatic        Right Ear: External ear normal       Left Ear: External ear normal    Eyes:      General:         Right eye: No discharge  Left eye: No discharge  Conjunctiva/sclera: Conjunctivae normal    Neck:      Vascular: No JVD  Trachea: No tracheal deviation  Cardiovascular:      Rate and Rhythm: Normal rate and regular rhythm  Heart sounds: Normal heart sounds  Pulmonary:      Effort: Pulmonary effort is normal       Breath sounds: Normal breath sounds  No wheezing  Abdominal:      General: There is no distension  Musculoskeletal:         General: Tenderness present  Normal range of motion  Cervical back: Normal range of motion  Skin:     General: Skin is warm and dry  Neurological:      Mental Status: He is alert and oriented to person, place, and time  Psychiatric:         Mood and Affect: Mood normal          Speech: Speech normal          Behavior: Behavior normal          ED Medications  Medications - No data to display    Diagnostic Studies  Results Reviewed     None                 CT head wo contrast   Final Result by Daniella Gage MD (05/11 6566)      No acute intracranial abnormality  Workstation performed: [id number]         CT spine cervical without contrast   Final Result by Daniella Gage MD (05/11 0619)      No cervical spine fracture or traumatic malalignment  Workstation performed: [id number]         XR forearm 2 views LEFT   ED Interpretation by Gabriel Hameed DO (05/11 0721)   X-ray interpreted me shows no fracture, no acute pathology      Final Result by Ibis Colon MD (05/11 0900)      No acute osseous abnormality  Workstation performed: [id number]         XR wrist 3+ views LEFT   ED Interpretation by Gabriel Hameed DO (05/11 0721)   Left wrist x-ray interpreted me shows no fracture, no dislocation, no acute pathology      Final Result by Ibis Colon MD (05/11 0900)      No acute osseous abnormality              Workstation performed: [id number] Procedures  Procedures      ED Course               Identification of Seniors at Risk    Flowsheet Row Most Recent Value   (ISAR) Identification of Seniors at Risk    Before the illness or injury that brought you to the Emergency, did you need someone to help you on a regular basis? 1 Filed at: 05/11/2022 8119   In the last 24 hours, have you needed more help than usual? 1 Filed at: 05/11/2022 8572   Have you been hospitalized for one or more nights during the past 6 months? 1 Filed at: 05/11/2022 4439   In general, do you see well? 0 Filed at: 05/11/2022 0254   In general, do you have serious problems with your memory? 0 Filed at: 05/11/2022 3927   Do you take more than three different medications every day? 1 Filed at: 05/11/2022 2204   ISAR Score 4 Filed at: 05/11/2022 5574                    SBIRT 20yo+    Flowsheet Row Most Recent Value   SBIRT (25 yo +)    In order to provide better care to our patients, we are screening all of our patients for alcohol and drug use  Would it be okay to ask you these screening questions? No Filed at: 05/11/2022 0715                MDM  Number of Diagnoses or Management Options  Fall, initial encounter  Wrist injury  Diagnosis management comments: No acute injury seen on imaging  Provided removable wrist brace for comfort  Contact information for Orthopedics provided  Discharged stable condition with return precautions      Disposition  Final diagnoses:   Fall, initial encounter   Wrist injury     Time reflects when diagnosis was documented in both MDM as applicable and the Disposition within this note     Time User Action Codes Description Comment    5/11/2022  8:52 AM Hetal Me Add [B18  KWWV] Fall, initial encounter     5/11/2022  8:52 AM Hetal Me Add [S69 90XA] Wrist injury       ED Disposition     ED Disposition   Discharge    Condition   Stable    Date/Time   Wed May 11, 2022  8:53 AM    Comment   [place] Began discharge to home/self care  Follow-up Information     Follow up With Specialties Details Why Contact Info Additional Information    [facility]   [address] [id number] [address] [facility], [address] [place], [place], [address]  Use Entrance A           Discharge Medication List as of 5/11/2022  8:54 AM      START taking these medications    Details   Diclofenac Sodium (VOLTAREN) 1 % Apply 2 g topically in the morning and 2 g at noon and 2 g in the evening and 2 g before bedtime  , Starting Wed 5/11/2022, Normal      traMADol (ULTRAM) 50 mg tablet TAKE 1 TABLET (50 MG TOTAL) BY MOUTH EVERY 8 (EIGHT) HOURS AS NEEDED FOR MODERATE PAIN, Starting Wed 5/11/2022, Normal         CONTINUE these medications which have NOT CHANGED    Details   acetaminophen (TYLENOL) 500 mg tablet Take 1 tablet (500 mg total) by mouth every 6 (six) hours as needed for mild pain, Starting Sun 5/23/2021, Normal      clopidogrel (PLAVIX) 75 mg tablet Take 1 tablet (75 mg total) by mouth daily, Starting Mon 8/30/2021, Normal      insulin detemir (Levemir FlexTouch) 100 Units/mL injection pen Inject 22 Units under the skin daily at bedtime, Starting Mon 8/30/2021, Normal      Insulin Pen Needle (Pen Needles) 32G X 4 MM MISC Use daily at bedtime, Starting Tue 8/31/2021, Normal      INSULIN SYRINGE 1CC/29G 29G X 1/2" 1 ML MISC by Does not apply route, Starting Wed 1/11/2012, Historical Med      Lancet Devices (Lancing Device) MISC USE AS DIRECTED, Normal      Lancets MISC by Does not apply route, Starting Fri 5/12/2017, Historical Med      OneTouch Ultra test strip TEST UP TO 3 TIMES A DAY, Normal      pregabalin (LYRICA) 50 mg capsule TAKE 1 CAPSULE (50 MG TOTAL) BY MOUTH 3 (THREE) TIMES A DAY, Starting Sun 4/17/2022, Normal      rosuvastatin (CRESTOR) 40 MG tablet TAKE ONE (1) TABLET BY MOUTH DAILY, Normal      tacrolimus (PROGRAF) 1 mg capsule Take 1 capsule (1 mg total) by mouth every 12 (twelve) hours, Starting Mon 8/30/2021, Normal      tamsulosin (FLOMAX) 0 4 mg TAKE 1 CAPSULE(0 4 MG) BY MOUTH DAILY WITH DINNER, Normal      temazepam (RESTORIL) 30 mg capsule TAKE 1 CAPSULE (30 MG TOTAL) BY MOUTH DAILY AT BEDTIME, Starting Wed 1/19/2022, Normal      triamcinolone (KENALOG) 0 1 % cream APPLY TO AFFECTED AREA TWICE DAILY, Normal      zolpidem (AMBIEN) 10 mg tablet Take 1 tablet (10 mg total) by mouth daily at bedtime, Starting Tue 3/1/2022, Normal           No discharge procedures on file  PDMP Review       Value Time User    PDMP Reviewed  Yes 5/11/2022  7:23 AM Vasquez Lozada DO           ED Provider  Attending physically available and evaluated Mariangel Sarmiento  OLESYA managed the patient along with the ED Attending      Electronically Signed by         Fernando Flores DO  05/13/22 2157

## 2022-05-11 NOTE — CASE MANAGEMENT
Case Management ED Assessment    [facility] [id number]  : 1950 Date 2022        OBJECTIVE:  Predictive Model Details         16% Factor Value    Risk of Hospital Admission or ED Visit Model Is in Relationship Yes     Number of ED Visits 2     Has Chronic Kidney Disease Yes     Has Peripheral Vascular Disease Yes     Has CVD Yes     Has Diabetes Yes     Has Chronic Liver Disease Yes     Has PCP Yes       Chief Complaint: Wrist pain   Patient Class: Emergency  Preferred Pharmacy:   [address] [place] [address], [address] [place] [id number]  Phone: [phone] Fax: [phone]    Primary Care Provider: Claribel Fair DO    Primary Insurance: [id number] [employer] [facility]  Secondary Insurance:     ED ASSESSMENT:  Kathia [place] Proxies    There are no active Health Care Proxies on file  Readmission Root Cause  30 Day Readmission: No    Outpatient Care Information  Have you seen a doctor in the last year?: Yes  Specify which physician group(s) you have seen in the past year : Other  Specialist(s) seen outside of [address] [place] [address] or [facility]: PCP    Prescribed Medications Prior to Admission/ED Visit  Has patient been prescribed medications (prior to this ED visit/admission)?: Yes  Any difficulty obtaining medications?: No  Has the patient been taking all prescribed medication(s)?: Yes  Does the patient have difficulty affording medication(s)?: No    Patient Information  Admitted from[de-identified] Home  Mental Status: Alert  During Assessment patient was accompanied by: Spouse  Assessment information provided by[de-identified] Spouse  Primary Caregiver: Spouse  Caregiver's Name[de-identified] Suzette Roles Relationship to Patient[de-identified] Significant Other  Caregiver's Telephone Number[de-identified] [phone]  Support Systems: Spouse/significant other, Family members  [place] of Residence: [address] [place] [address] do you live in?: [facility] entry access options   Select all that apply : Stairs  Number of steps to enter home : 3  Type of Current Residence: 2 story home  Upon entering residence, is there a bedroom on the main floor (no further steps)?: Yes  Upon entering residence, is there a bathroom on the main floor (no further steps)?: Yes  In the last 12 months, was there a time when you were not able to pay the mortgage or rent on time?: No  In the last 12 months, how many places have you lived?: 1  In the last 12 months, was there a time when you did not have a steady place to sleep or slept in a shelter (including now)?: No  Homeless/housing insecurity resource given?: No  Living Arrangements: Lives w/ Spouse/significant other    Patient Information Continued  Income Source: Pension/long-term  Does patient have prescription coverage?: Yes  Within the past 12 months, you worried that your food would run out before you got the money to buy more : Never true  Within the past 12 months, the food you bought just didn't last and you didn't have money to get more : Never true  Food insecurity resource given?: No  Does patient have a history of substance abuse?: Yes  Historical substance use preference: Alcohol/ETOH  Is patient currently in treatment for substance abuse?: N/A - sober  Does patient have a history of Mental Health Diagnosis?: No    Food and Transportation  What is patient's usual means of transportation?: Family Transport  Ask patient:  How would you describe your eating habits?: Good

## 2022-05-11 NOTE — DISCHARGE INSTRUCTIONS
You may wear your wrist splint for comfort  If pain persists, follow up with orthopedic specialist  Contact information provided

## 2022-05-17 DIAGNOSIS — Z79.4 CONTROLLED TYPE 2 DIABETES MELLITUS WITH DIABETIC NEUROPATHY, WITH LONG-TERM CURRENT USE OF INSULIN (HCC): ICD-10-CM

## 2022-05-17 DIAGNOSIS — E11.40 CONTROLLED TYPE 2 DIABETES MELLITUS WITH DIABETIC NEUROPATHY, WITH LONG-TERM CURRENT USE OF INSULIN (HCC): ICD-10-CM

## 2022-05-17 RX ORDER — LANCETS 33 GAUGE
EACH MISCELLANEOUS
Qty: 100 EACH | Refills: 5 | Status: SHIPPED | OUTPATIENT
Start: 2022-05-17

## 2022-05-19 DIAGNOSIS — R21 RASH: ICD-10-CM

## 2022-05-19 RX ORDER — TRIAMCINOLONE ACETONIDE 1 MG/G
CREAM TOPICAL
Qty: 80 G | Refills: 5 | Status: SHIPPED | OUTPATIENT
Start: 2022-05-19 | End: 2022-06-11

## 2022-05-23 ENCOUNTER — HOSPITAL ENCOUNTER (INPATIENT)
Facility: HOSPITAL | Age: 72
LOS: 7 days | Discharge: HOME HEALTH | DRG: 669 | End: 2022-05-30
Attending: EMERGENCY MEDICINE | Admitting: INTERNAL MEDICINE
Payer: COMMERCIAL

## 2022-05-23 ENCOUNTER — APPOINTMENT (INPATIENT)
Dept: RADIOLOGY | Facility: HOSPITAL | Age: 72
DRG: 669 | End: 2022-05-23
Payer: COMMERCIAL

## 2022-05-23 DIAGNOSIS — N18.4 STAGE 4 CHRONIC KIDNEY DISEASE (HCC): ICD-10-CM

## 2022-05-23 DIAGNOSIS — N13.30 HYDRONEPHROSIS OF RIGHT KIDNEY: ICD-10-CM

## 2022-05-23 DIAGNOSIS — E11.40 CONTROLLED TYPE 2 DIABETES MELLITUS WITH DIABETIC NEUROPATHY, WITH LONG-TERM CURRENT USE OF INSULIN (HCC): ICD-10-CM

## 2022-05-23 DIAGNOSIS — R31.0 GROSS HEMATURIA: ICD-10-CM

## 2022-05-23 DIAGNOSIS — Z78.9 PROBLEM WITH VASCULAR ACCESS: ICD-10-CM

## 2022-05-23 DIAGNOSIS — N32.9 LESION OF BLADDER: ICD-10-CM

## 2022-05-23 DIAGNOSIS — Z94.4 LIVER TRANSPLANT STATUS (HCC): ICD-10-CM

## 2022-05-23 DIAGNOSIS — N32.89 MASS OF BLADDER: ICD-10-CM

## 2022-05-23 DIAGNOSIS — R50.9 FEVER: ICD-10-CM

## 2022-05-23 DIAGNOSIS — R31.9 HEMATURIA: Primary | ICD-10-CM

## 2022-05-23 DIAGNOSIS — Z79.4 CONTROLLED TYPE 2 DIABETES MELLITUS WITH DIABETIC NEUROPATHY, WITH LONG-TERM CURRENT USE OF INSULIN (HCC): ICD-10-CM

## 2022-05-23 LAB
ALBUMIN SERPL BCP-MCNC: 3.1 G/DL (ref 3.5–5)
ALP SERPL-CCNC: 85 U/L (ref 46–116)
ALT SERPL W P-5'-P-CCNC: 25 U/L (ref 12–78)
ANION GAP SERPL CALCULATED.3IONS-SCNC: 0 MMOL/L (ref 4–13)
AST SERPL W P-5'-P-CCNC: 57 U/L (ref 5–45)
ATRIAL RATE: 67 BPM
BACTERIA UR QL AUTO: ABNORMAL /HPF
BASOPHILS # BLD AUTO: 0.03 THOUSANDS/ΜL (ref 0–0.1)
BASOPHILS NFR BLD AUTO: 0 % (ref 0–1)
BILIRUB SERPL-MCNC: 0.4 MG/DL (ref 0.2–1)
BILIRUB UR QL STRIP: NEGATIVE
BUN SERPL-MCNC: 42 MG/DL (ref 5–25)
CALCIUM ALBUM COR SERPL-MCNC: 9.7 MG/DL (ref 8.3–10.1)
CALCIUM SERPL-MCNC: 9 MG/DL (ref 8.3–10.1)
CHLORIDE SERPL-SCNC: 105 MMOL/L (ref 100–108)
CLARITY UR: ABNORMAL
CO2 SERPL-SCNC: 29 MMOL/L (ref 21–32)
COLOR UR: ABNORMAL
CREAT SERPL-MCNC: 2.94 MG/DL (ref 0.6–1.3)
EOSINOPHIL # BLD AUTO: 0.6 THOUSAND/ΜL (ref 0–0.61)
EOSINOPHIL NFR BLD AUTO: 8 % (ref 0–6)
ERYTHROCYTE [DISTWIDTH] IN BLOOD BY AUTOMATED COUNT: 15.3 % (ref 11.6–15.1)
GFR SERPL CREATININE-BSD FRML MDRD: 20 ML/MIN/1.73SQ M
GLUCOSE SERPL-MCNC: 174 MG/DL (ref 65–140)
GLUCOSE SERPL-MCNC: 178 MG/DL (ref 65–140)
GLUCOSE SERPL-MCNC: 208 MG/DL (ref 65–140)
GLUCOSE SERPL-MCNC: 225 MG/DL (ref 65–140)
GLUCOSE UR STRIP-MCNC: ABNORMAL MG/DL
HCT VFR BLD AUTO: 31.7 % (ref 36.5–49.3)
HCT VFR BLD AUTO: 33.2 % (ref 36.5–49.3)
HGB BLD-MCNC: 10.4 G/DL (ref 12–17)
HGB BLD-MCNC: 9.7 G/DL (ref 12–17)
HGB UR QL STRIP.AUTO: ABNORMAL
IMM GRANULOCYTES # BLD AUTO: 0.02 THOUSAND/UL (ref 0–0.2)
IMM GRANULOCYTES NFR BLD AUTO: 0 % (ref 0–2)
INR PPP: 1.1 (ref 0.84–1.19)
KETONES UR STRIP-MCNC: NEGATIVE MG/DL
LEUKOCYTE ESTERASE UR QL STRIP: ABNORMAL
LYMPHOCYTES # BLD AUTO: 2.61 THOUSANDS/ΜL (ref 0.6–4.47)
LYMPHOCYTES NFR BLD AUTO: 34 % (ref 14–44)
MCH RBC QN AUTO: 23.9 PG (ref 26.8–34.3)
MCHC RBC AUTO-ENTMCNC: 30.6 G/DL (ref 31.4–37.4)
MCV RBC AUTO: 78 FL (ref 82–98)
MONOCYTES # BLD AUTO: 0.65 THOUSAND/ΜL (ref 0.17–1.22)
MONOCYTES NFR BLD AUTO: 9 % (ref 4–12)
NEUTROPHILS # BLD AUTO: 3.68 THOUSANDS/ΜL (ref 1.85–7.62)
NEUTS SEG NFR BLD AUTO: 49 % (ref 43–75)
NITRITE UR QL STRIP: NEGATIVE
NON-SQ EPI CELLS URNS QL MICRO: ABNORMAL /HPF
NRBC BLD AUTO-RTO: 0 /100 WBCS
P AXIS: -2 DEGREES
PH UR STRIP.AUTO: 7 [PH]
PLATELET # BLD AUTO: 162 THOUSANDS/UL (ref 149–390)
PMV BLD AUTO: 13.3 FL (ref 8.9–12.7)
POTASSIUM SERPL-SCNC: 4.5 MMOL/L (ref 3.5–5.3)
POTASSIUM SERPL-SCNC: 6 MMOL/L (ref 3.5–5.3)
PR INTERVAL: 160 MS
PROT SERPL-MCNC: 8 G/DL (ref 6.4–8.2)
PROT UR STRIP-MCNC: ABNORMAL MG/DL
PROTHROMBIN TIME: 13.7 SECONDS (ref 11.6–14.5)
QRS AXIS: -13 DEGREES
QRSD INTERVAL: 78 MS
QT INTERVAL: 374 MS
QTC INTERVAL: 395 MS
RBC # BLD AUTO: 4.06 MILLION/UL (ref 3.88–5.62)
RBC #/AREA URNS AUTO: ABNORMAL /HPF
SODIUM SERPL-SCNC: 134 MMOL/L (ref 136–145)
SP GR UR STRIP.AUTO: 1.01 (ref 1–1.03)
T WAVE AXIS: 17 DEGREES
UROBILINOGEN UR STRIP-ACNC: <2 MG/DL
VENTRICULAR RATE: 67 BPM
WBC # BLD AUTO: 7.59 THOUSAND/UL (ref 4.31–10.16)
WBC #/AREA URNS AUTO: ABNORMAL /HPF

## 2022-05-23 PROCEDURE — 99223 1ST HOSP IP/OBS HIGH 75: CPT | Performed by: INTERNAL MEDICINE

## 2022-05-23 PROCEDURE — G1004 CDSM NDSC: HCPCS

## 2022-05-23 PROCEDURE — 99222 1ST HOSP IP/OBS MODERATE 55: CPT | Performed by: UROLOGY

## 2022-05-23 PROCEDURE — 85610 PROTHROMBIN TIME: CPT

## 2022-05-23 PROCEDURE — 84132 ASSAY OF SERUM POTASSIUM: CPT

## 2022-05-23 PROCEDURE — RECHECK: Performed by: PHYSICIAN ASSISTANT

## 2022-05-23 PROCEDURE — 99222 1ST HOSP IP/OBS MODERATE 55: CPT | Performed by: INTERNAL MEDICINE

## 2022-05-23 PROCEDURE — 85014 HEMATOCRIT: CPT | Performed by: PHYSICIAN ASSISTANT

## 2022-05-23 PROCEDURE — 74176 CT ABD & PELVIS W/O CONTRAST: CPT

## 2022-05-23 PROCEDURE — 36415 COLL VENOUS BLD VENIPUNCTURE: CPT

## 2022-05-23 PROCEDURE — 87086 URINE CULTURE/COLONY COUNT: CPT | Performed by: EMERGENCY MEDICINE

## 2022-05-23 PROCEDURE — 99285 EMERGENCY DEPT VISIT HI MDM: CPT | Performed by: EMERGENCY MEDICINE

## 2022-05-23 PROCEDURE — 99284 EMERGENCY DEPT VISIT MOD MDM: CPT

## 2022-05-23 PROCEDURE — 93005 ELECTROCARDIOGRAM TRACING: CPT

## 2022-05-23 PROCEDURE — 80197 ASSAY OF TACROLIMUS: CPT

## 2022-05-23 PROCEDURE — 85025 COMPLETE CBC W/AUTO DIFF WBC: CPT

## 2022-05-23 PROCEDURE — 82948 REAGENT STRIP/BLOOD GLUCOSE: CPT

## 2022-05-23 PROCEDURE — 80053 COMPREHEN METABOLIC PANEL: CPT

## 2022-05-23 PROCEDURE — 93010 ELECTROCARDIOGRAM REPORT: CPT | Performed by: INTERNAL MEDICINE

## 2022-05-23 PROCEDURE — 51702 INSERT TEMP BLADDER CATH: CPT | Performed by: PHYSICIAN ASSISTANT

## 2022-05-23 PROCEDURE — 85018 HEMOGLOBIN: CPT | Performed by: PHYSICIAN ASSISTANT

## 2022-05-23 PROCEDURE — 81001 URINALYSIS AUTO W/SCOPE: CPT | Performed by: EMERGENCY MEDICINE

## 2022-05-23 RX ORDER — PREGABALIN 50 MG/1
50 CAPSULE ORAL 3 TIMES DAILY
Status: DISCONTINUED | OUTPATIENT
Start: 2022-05-23 | End: 2022-05-30 | Stop reason: HOSPADM

## 2022-05-23 RX ORDER — OXYBUTYNIN CHLORIDE 5 MG/1
5 TABLET, EXTENDED RELEASE ORAL DAILY
Status: DISCONTINUED | OUTPATIENT
Start: 2022-05-23 | End: 2022-05-25

## 2022-05-23 RX ORDER — ZOLPIDEM TARTRATE 5 MG/1
10 TABLET ORAL
Status: DISCONTINUED | OUTPATIENT
Start: 2022-05-23 | End: 2022-05-30 | Stop reason: HOSPADM

## 2022-05-23 RX ORDER — TEMAZEPAM 15 MG/1
30 CAPSULE ORAL
Status: DISCONTINUED | OUTPATIENT
Start: 2022-05-23 | End: 2022-05-30 | Stop reason: HOSPADM

## 2022-05-23 RX ORDER — TRAMADOL HYDROCHLORIDE 50 MG/1
50 TABLET ORAL EVERY 8 HOURS PRN
Status: DISCONTINUED | OUTPATIENT
Start: 2022-05-23 | End: 2022-05-23

## 2022-05-23 RX ORDER — LORAZEPAM 0.5 MG/1
0.5 TABLET ORAL ONCE
Status: COMPLETED | OUTPATIENT
Start: 2022-05-23 | End: 2022-05-23

## 2022-05-23 RX ORDER — INSULIN LISPRO 100 [IU]/ML
1-5 INJECTION, SOLUTION INTRAVENOUS; SUBCUTANEOUS
Status: DISCONTINUED | OUTPATIENT
Start: 2022-05-23 | End: 2022-05-30 | Stop reason: HOSPADM

## 2022-05-23 RX ORDER — FENTANYL CITRATE 50 UG/ML
100 INJECTION, SOLUTION INTRAMUSCULAR; INTRAVENOUS ONCE
Status: COMPLETED | OUTPATIENT
Start: 2022-05-23 | End: 2022-05-23

## 2022-05-23 RX ORDER — ATROPA BELLADONNA AND OPIUM 16.2; 3 MG/1; MG/1
30 SUPPOSITORY RECTAL EVERY 8 HOURS PRN
Status: DISCONTINUED | OUTPATIENT
Start: 2022-05-23 | End: 2022-05-30 | Stop reason: HOSPADM

## 2022-05-23 RX ORDER — TACROLIMUS 1 MG/1
1 CAPSULE ORAL EVERY 12 HOURS
Status: DISCONTINUED | OUTPATIENT
Start: 2022-05-23 | End: 2022-05-30 | Stop reason: HOSPADM

## 2022-05-23 RX ORDER — HYDROMORPHONE HCL/PF 1 MG/ML
1 SYRINGE (ML) INJECTION EVERY 4 HOURS PRN
Status: DISCONTINUED | OUTPATIENT
Start: 2022-05-23 | End: 2022-05-24

## 2022-05-23 RX ORDER — HYDROMORPHONE HCL/PF 1 MG/ML
0.5 SYRINGE (ML) INJECTION EVERY 4 HOURS PRN
Status: DISCONTINUED | OUTPATIENT
Start: 2022-05-23 | End: 2022-05-24

## 2022-05-23 RX ORDER — TAMSULOSIN HYDROCHLORIDE 0.4 MG/1
0.4 CAPSULE ORAL
Status: DISCONTINUED | OUTPATIENT
Start: 2022-05-23 | End: 2022-05-30 | Stop reason: HOSPADM

## 2022-05-23 RX ORDER — ACETAMINOPHEN 325 MG/1
650 TABLET ORAL EVERY 6 HOURS PRN
Status: DISCONTINUED | OUTPATIENT
Start: 2022-05-23 | End: 2022-05-30 | Stop reason: HOSPADM

## 2022-05-23 RX ORDER — ONDANSETRON 2 MG/ML
4 INJECTION INTRAMUSCULAR; INTRAVENOUS EVERY 6 HOURS PRN
Status: DISCONTINUED | OUTPATIENT
Start: 2022-05-23 | End: 2022-05-30 | Stop reason: HOSPADM

## 2022-05-23 RX ADMIN — FENTANYL CITRATE 100 MCG: 0.05 INJECTION, SOLUTION INTRAMUSCULAR; INTRAVENOUS at 11:14

## 2022-05-23 RX ADMIN — INSULIN LISPRO 2 UNITS: 100 INJECTION, SOLUTION INTRAVENOUS; SUBCUTANEOUS at 21:46

## 2022-05-23 RX ADMIN — OXYBUTYNIN 5 MG: 5 TABLET, FILM COATED, EXTENDED RELEASE ORAL at 12:42

## 2022-05-23 RX ADMIN — TAMSULOSIN HYDROCHLORIDE 0.4 MG: 0.4 CAPSULE ORAL at 16:03

## 2022-05-23 RX ADMIN — PREGABALIN 50 MG: 50 CAPSULE ORAL at 16:03

## 2022-05-23 RX ADMIN — ZOLPIDEM TARTRATE 10 MG: 5 TABLET ORAL at 21:45

## 2022-05-23 RX ADMIN — TRAMADOL HYDROCHLORIDE 50 MG: 50 TABLET, FILM COATED ORAL at 08:45

## 2022-05-23 RX ADMIN — HYDROMORPHONE HYDROCHLORIDE 1 MG: 1 INJECTION, SOLUTION INTRAMUSCULAR; INTRAVENOUS; SUBCUTANEOUS at 20:11

## 2022-05-23 RX ADMIN — TEMAZEPAM 30 MG: 15 CAPSULE ORAL at 21:45

## 2022-05-23 RX ADMIN — TACROLIMUS 1 MG: 1 CAPSULE ORAL at 20:05

## 2022-05-23 RX ADMIN — PREGABALIN 50 MG: 50 CAPSULE ORAL at 08:45

## 2022-05-23 RX ADMIN — PREGABALIN 50 MG: 50 CAPSULE ORAL at 20:06

## 2022-05-23 RX ADMIN — ACETAMINOPHEN 650 MG: 325 TABLET, FILM COATED ORAL at 16:03

## 2022-05-23 RX ADMIN — ATROPA BELLADONNA AND OPIUM 1 SUPPOSITORY: 16.2; 3 SUPPOSITORY RECTAL at 12:42

## 2022-05-23 RX ADMIN — INSULIN DETEMIR 22 UNITS: 100 INJECTION, SOLUTION SUBCUTANEOUS at 21:45

## 2022-05-23 RX ADMIN — INSULIN LISPRO 1 UNITS: 100 INJECTION, SOLUTION INTRAVENOUS; SUBCUTANEOUS at 16:47

## 2022-05-23 RX ADMIN — LORAZEPAM 0.5 MG: 0.5 TABLET ORAL at 12:42

## 2022-05-23 RX ADMIN — HYDROMORPHONE HYDROCHLORIDE 1 MG: 1 INJECTION, SOLUTION INTRAMUSCULAR; INTRAVENOUS; SUBCUTANEOUS at 14:22

## 2022-05-23 NOTE — ASSESSMENT & PLAN NOTE
· Gross hematuria as noted by patient with clots developing earlier this evening, and persistent  · Plan was to place 3 way catheter in ED to start bladder irrigation however this unfortunately could not be placed  · Consult Urology to assist with management  · Hold AP/AC  · UA to be obtained, low threshold to consider antibiotic therapy  · Trend serial hemoglobins

## 2022-05-23 NOTE — ASSESSMENT & PLAN NOTE
· History of liver transplant in 2003, maintained on Tacrolimus managed by [address] [place] [address] Drive transplant  · Continue home dose of Prograf 1 mg q 12 hours  · Pending tacrolimus level; may need to reach out to [address] [place] [address] Drive Transplant to assist with dosing

## 2022-05-23 NOTE — ED PROVIDER NOTES
History  Chief Complaint   Patient presents with    Blood in Urine     Pt reports bright red blood in urine starting this evening around 0100  Denies pain or trauma      75-year-old male presenting with a chief complaint of blood in his urine  Patient states that he woke up this morning around 1:00 a m  And attempted to urinate but had some difficulty, he was eventually able to urinate and noticed bright red blood in the toilet  Patient went back to sleep and woke up again a few hours later to urinate and noticed that he was having some suprapubic pain  His urine again was bright red blood  Patient has had 2 more passages of bright red blood  Patient is on Plavix  He reports no other bleeding disorders  Patient denied any urinary difficulties in the past however I see based on medical chart review that the patient is on Flomax and has had prior CTs that demonstrate enlarged, calcified prostate  Patient is denying any penile pain, denying any penile injury, denies any penile trauma, and denies any penile discharge in the recent weeks  He is denying any fevers, chills, night sweats  Prior to Admission Medications   Prescriptions Last Dose Informant Patient Reported? Taking? Comfort EZ Pen Needles 32G X 4 MM MISC   No No   Sig: USE 3-4 AS DIRECTED   Diclofenac Sodium (VOLTAREN) 1 %   No No   Sig: Apply 2 g topically in the morning and 2 g at noon and 2 g in the evening and 2 g before bedtime     INSULIN SYRINGE 1CC/29G 29G X 1/2" 1 ML MISC  Self Yes No   Sig: by Does not apply route   Lancet Devices (Lancing Device) MISC   No No   Sig: USE AS DIRECTED   Lancets MISC  Self Yes No   Sig: by Does not apply route   OneTouch Ultra test strip   No No   Sig: TEST UP TO 3 TIMES A DAY   acetaminophen (TYLENOL) 500 mg tablet   No No   Sig: Take 1 tablet (500 mg total) by mouth every 6 (six) hours as needed for mild pain   clopidogrel (PLAVIX) 75 mg tablet   No No   Sig: Take 1 tablet (75 mg total) by mouth daily   insulin detemir (Levemir FlexTouch) 100 Units/mL injection pen   No No   Sig: Inject 22 Units under the skin daily at bedtime   pregabalin (LYRICA) 50 mg capsule   No No   Sig: TAKE 1 CAPSULE (50 MG TOTAL) BY MOUTH 3 (THREE) TIMES A DAY   rosuvastatin (CRESTOR) 40 MG tablet   No No   Sig: TAKE ONE (1) TABLET BY MOUTH DAILY   tacrolimus (PROGRAF) 1 mg capsule   No No   Sig: Take 1 capsule (1 mg total) by mouth every 12 (twelve) hours   tamsulosin (FLOMAX) 0 4 mg  Self No No   Sig: TAKE 1 CAPSULE(0 4 MG) BY MOUTH DAILY WITH DINNER   temazepam (RESTORIL) 30 mg capsule   No No   Sig: TAKE 1 CAPSULE (30 MG TOTAL) BY MOUTH DAILY AT BEDTIME   traMADol (ULTRAM) 50 mg tablet   No No   Sig: TAKE 1 TABLET (50 MG TOTAL) BY MOUTH EVERY 8 (EIGHT) HOURS AS NEEDED FOR MODERATE PAIN   triamcinolone (KENALOG) 0 1 % cream   No No   Sig: APPLY TO AFFECTED AREA TWICE DAILY   zolpidem (AMBIEN) 10 mg tablet   No No   Sig: Take 1 tablet (10 mg total) by mouth daily at bedtime      Facility-Administered Medications: None       Past Medical History:   Diagnosis Date    [facility]     Cerebral artery aneurysm     Change in mental state     last assessed 5/18/15; resolved 10/27/15    Diabetes mellitus ([facility]     [facility]     Fatigue     last assessed 1/26/15; resolved 5/24/16    Hepatitis [facility] discharge follow-up 12/21/2018    Kidney disease     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[facility]     [facility]     Renal disorder     Subdural hygroma     2/27/14; resolved 7/28/15    Thrombocytopenia ([facility] ) 9/20/2017       Past Surgical History:   Procedure Laterality Date    BRAIN SURGERY  02/12/2014    left frontotemporal 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    CATARACT EXTRACTION      CHOLECYSTECTOMY      FL LUMBAR PUNCTURE DIAGNOSTIC  12/14/2018    IR PICC LINE  12/14/2018    LIVER TRANSPLANTATION      ROTATOR CUFF REPAIR      SHOULDER SURGERY         Family History   Problem Relation Age of Onset    Hypertension Mother         benign essential     Lung cancer Father     Diabetes Sister     Cancer Brother     Stroke Other         cva - due to embolism of cerebra artery      I have reviewed and agree with the history as documented  E-Cigarette/Vaping    E-Cigarette Use Never User      E-Cigarette/Vaping Substances    Nicotine No     THC No     CBD No     Flavoring No     Other No     Unknown No      Social History     Tobacco Use    Smoking status: Never Smoker    Smokeless tobacco: Never Used    Tobacco comment: former smoker per [employer]scripts    Vaping Use    Vaping Use: Never used   Substance Use Topics    Alcohol use: Not Currently    Drug use: No     Comment: remotely quit drug use per [employer]scripts         Review of Systems   Constitutional: Negative for chills, fatigue and fever  HENT: Negative for congestion and sore throat  Eyes: Negative for pain and visual disturbance  Respiratory: Negative for cough, chest tightness and shortness of breath  Cardiovascular: Negative for chest pain and palpitations  Gastrointestinal: Negative for abdominal pain, blood in stool, constipation, diarrhea, nausea and vomiting  Genitourinary: Positive for decreased urine volume, difficulty urinating and hematuria  Negative for dysuria and flank pain  Musculoskeletal: Negative for arthralgias, back pain and neck pain  Skin: Negative for color change and rash  Neurological: Negative for dizziness, syncope and light-headedness  Hematological: Negative for adenopathy  Does not bruise/bleed easily  All other systems reviewed and are negative        Physical Exam  ED Triage Vitals   Temperature Pulse Respirations Blood Pressure SpO2   05/23/22 0449 05/23/22 0449 05/23/22 0449 05/23/22 0449 05/23/22 0449   98 2 °F (36 8 °C) 73 18 147/64 100 %      Temp Source Heart Rate Source Patient Position - Orthostatic VS BP Location FiO2 (%) 05/23/22 0449 05/23/22 0449 05/23/22 0449 05/23/22 0449 --   Oral Monitor Lying Right arm       Pain Score       05/23/22 0727       7             Orthostatic Vital Signs  Vitals:    05/24/22 1600 05/24/22 2305 05/25/22 0727 05/25/22 1516   BP: 107/66 132/72 130/71 105/62   Pulse:    86   Patient Position - Orthostatic VS:           Physical Exam  Vitals and nursing note reviewed  Constitutional:       General: He is not in acute distress  Appearance: He is well-developed  He is not ill-appearing or toxic-appearing  HENT:      Head: Normocephalic and atraumatic  Eyes:      Conjunctiva/sclera: Conjunctivae normal    Cardiovascular:      Rate and Rhythm: Normal rate and regular rhythm  Heart sounds: No murmur heard  Pulmonary:      Effort: Pulmonary effort is normal  No respiratory distress  Breath sounds: Normal breath sounds  Abdominal:      Palpations: Abdomen is soft  Tenderness: There is no abdominal tenderness  Musculoskeletal:      Cervical back: Neck supple  Skin:     General: Skin is warm and dry  Neurological:      Mental Status: He is alert           ED Medications  Medications   insulin detemir (LEVEMIR) subcutaneous injection 22 Units (22 Units Subcutaneous Given 5/24/22 2126)   pregabalin (LYRICA) capsule 50 mg (50 mg Oral Given 5/25/22 1730)   tacrolimus (PROGRAF) capsule 1 mg (1 mg Oral Given 5/25/22 1048)   tamsulosin (FLOMAX) capsule 0 4 mg (0 4 mg Oral Given 5/25/22 1730)   temazepam (RESTORIL) capsule 30 mg (30 mg Oral Given 5/24/22 2115)   zolpidem (AMBIEN) tablet 10 mg (10 mg Oral Given 5/24/22 2115)   ondansetron (ZOFRAN) injection 4 mg ( Intravenous MAR Unhold 5/24/22 1546)   acetaminophen (TYLENOL) tablet 650 mg (650 mg Oral Given 5/25/22 0600)   insulin lispro (HumaLOG) 100 units/mL subcutaneous injection 1-5 Units (2 Units Subcutaneous Given 5/25/22 1728)   insulin lispro (HumaLOG) 100 units/mL subcutaneous injection 1-5 Units (1 Units Subcutaneous Given 5/24/22 2126)   oxybutynin (DITROPAN-XL) 24 hr tablet 5 mg (5 mg Oral Given 5/25/22 1047)   belladonna-opium (B&O SUPPOSITORY) 16 2-30 mg suppository 1 suppository ( Rectal MAR Unhold 5/24/22 1546)   HYDROmorphone (DILAUDID) injection 0 5 mg (0 5 mg Intravenous Given 5/25/22 1353)   HYDROmorphone (DILAUDID) tablet 2 mg (has no administration in time range)   HYDROmorphone (DILAUDID) tablet 4 mg (4 mg Oral Given 5/25/22 1939)   multi-electrolyte (PLASMALYTE-A/ISOLYTE-S PH 7 4) IV solution (75 mL/hr Intravenous New Bag 5/25/22 1336)   fentanyl citrate (PF) 100 MCG/2ML 100 mcg (100 mcg Intramuscular Given 5/23/22 1114)   LORazepam (ATIVAN) tablet 0 5 mg (0 5 mg Oral Given 5/23/22 1242)       Diagnostic Studies  Results Reviewed     Procedure Component Value Units Date/Time    Tacrolimus level [[id number]]  (Normal) Collected: 05/23/22 0638    Lab Status: Final result Specimen: Blood from Arm, Right Updated: 05/24/22 1906     TACROLIMUS 3 3 ng/mL     Urine culture [[id number]] Collected: 05/23/22 0612    Lab Status: Final result Specimen: Urine, Clean Catch Updated: 05/24/22 0759     Urine Culture No Growth <1000 cfu/mL    Urine Microscopic [[id number]]  (Abnormal) Collected: 05/23/22 0612    Lab Status: Final result Specimen: Urine, Clean Catch Updated: 05/23/22 0811     RBC, UA Innumerable /hpf      WBC, UA 20-30 /hpf      Epithelial Cells None Seen /hpf      Bacteria, UA None Seen /hpf     UA w Reflex to Microscopic w Reflex to Culture [[id number]]  (Abnormal) Collected: 05/23/22 0612    Lab Status: Final result Specimen: Urine, Clean Catch Updated: 05/23/22 0753     Color, UA Dark [place]     Clarity, UA Extra Turbid     Specific [place], UA 1 012     pH, UA 7 0     Leukocytes, UA Trace     Nitrite, UA Negative     Protein,  (3+) mg/dl      Glucose, UA Trace mg/dl      Ketones, UA Negative mg/dl      Urobilinogen, UA <2 0 mg/dl      Bilirubin, UA Negative     Blood, UA Large    Potassium [[id number]]  (Normal) Collected: 05/23/22 0638    Lab Status: Final result Specimen: Blood from Arm, Right Updated: 05/23/22 0721     Potassium 4 5 mmol/L     Comprehensive metabolic panel [[id number]]  (Abnormal) Collected: 05/23/22 0543    Lab Status: Final result Specimen: Blood from Arm, Right Updated: 05/23/22 0791     Sodium 134 mmol/L      Potassium 6 0 mmol/L      Chloride 105 mmol/L      CO2 29 mmol/L      ANION GAP 0 mmol/L      BUN 42 mg/dL      Creatinine 2 94 mg/dL      Glucose 178 mg/dL      Calcium 9 0 mg/dL      Corrected Calcium 9 7 mg/dL      AST 57 U/L      ALT 25 U/L      Alkaline Phosphatase 85 U/L      Total Protein 8 0 g/dL      Albumin 3 1 g/dL      Total Bilirubin 0 40 mg/dL      eGFR 20 ml/min/1 73sq m     Narrative:      National Kidney Disease Foundation guidelines for Chronic Kidney Disease (CKD):     Stage 1 with normal or high GFR (GFR > 90 mL/min/1 73 square meters)    Stage 2 Mild CKD (GFR = 60-89 mL/min/1 73 square meters)    Stage 3A Moderate CKD (GFR = 45-59 mL/min/1 73 square meters)    Stage 3B Moderate CKD (GFR = 30-44 mL/min/1 73 square meters)    Stage 4 Severe CKD (GFR = 15-29 mL/min/1 73 square meters)    Stage 5 End Stage CKD (GFR <15 mL/min/1 73 square meters)  Note: GFR calculation is accurate only with a steady state creatinine    Protime-INR [[id number]]  (Normal) Collected: 05/23/22 0543    Lab Status: Final result Specimen: Blood from Arm, Right Updated: 05/23/22 0622     Protime 13 7 seconds      INR 1 10    CBC and differential [[id number]]  (Abnormal) Collected: 05/23/22 0543    Lab Status: Final result Specimen: Blood from Arm, Right Updated: 05/23/22 0616     WBC 7 59 Thousand/uL      RBC 4 06 Million/uL      Hemoglobin 9 7 g/dL      Hematocrit 31 7 %      MCV 78 fL      MCH 23 9 pg      MCHC 30 6 g/dL      RDW 15 3 %      MPV 13 3 fL      Platelets 630 Thousands/uL      nRBC 0 /100 WBCs      Neutrophils Relative 49 %      Immat GRANS % 0 %      Lymphocytes Relative 34 % Monocytes Relative 9 %      Eosinophils Relative 8 %      Basophils Relative 0 %      Neutrophils Absolute 3 68 Thousands/µL      Immature Grans Absolute 0 02 Thousand/uL      Lymphocytes Absolute 2 61 Thousands/µL      Monocytes Absolute 0 65 Thousand/µL      Eosinophils Absolute 0 60 Thousand/µL      Basophils Absolute 0 03 Thousands/µL                  CT abdomen pelvis wo contrast   Final Result by Wendy Puri MD (05/23 0940)      Persistent moderate right-sided hydroureteronephrosis without a discrete ureteral calculus  There is however a large bladder mass now seen which may reflect hematoma and/or neoplasm  There is possible soft tissue density extending into the right    ureterovesical junction  This can be further evaluated with cystoscopy  Workstation performed: [id number]               Procedures  Procedures      ED Course                                       MDM  Number of Diagnoses or Management Options  Hematuria  Diagnosis management comments:   CC:  Gross hematuria    Impression/plan:  Unclear etiology, however patient will need immediate interventions including insertion of triple-lumen catheter for irrigation due to new reports of retention  Will check basic lab work to look for kidney function and other signs of metabolic disturbance  Additionally I will screen for tacrolimus toxicity  Reassessment/disposition:  Her team was unable to pass triple-lumen catheter, likely due to prostate issues  I will admit patient to be seen by Urology in for likely intervention  Lab work seems more less at patient's baseline  Tacrolimus level still pending        Disposition  Final diagnoses:   Hematuria     Time reflects when diagnosis was documented in both MDM as applicable and the Disposition within this note     Time User Action Codes Description Comment    5/23/2022  6:30 AM Miguelito Barragan Add [R31 9] Hematuria     5/23/2022  6:56 AM Lucinda CUELLO Add [R31 0] Gross hematuria     5/23/2022 12:21 PM Kristy Chinomoreno Add [Z94 4] Liver transplant status ([facility] )     5/23/2022 12:24 PM Kristy Admoreno Add [N13 30] Hydronephrosis of right kidney     5/23/2022 12:24 PM Kristy Adie Add [N32 9] Lesion of bladder     5/24/2022  2:09 PM [facility] [place] Add [Z78 9] Problem with vascular access       ED Disposition     ED Disposition   Admit    Condition   Stable    Date/Time   Mon May 23, 2022  6:32 AM    Comment   Case was discussed with Dr Komal Mazariegos and the patient's admission status was agreed to be Admission Status: inpatient status to the service of Dr Komal Mazariegos   Follow-up Information    None         Current Discharge Medication List      CONTINUE these medications which have CHANGED    Details   pregabalin (LYRICA) 50 mg capsule TAKE 1 CAPSULE (50 MG TOTAL) BY MOUTH 3 (THREE) TIMES A DAY  Qty: 90 capsule, Refills: 5    Associated Diagnoses: Neuropathy         CONTINUE these medications which have NOT CHANGED    Details   acetaminophen (TYLENOL) 500 mg tablet Take 1 tablet (500 mg total) by mouth every 6 (six) hours as needed for mild pain  Qty: 30 tablet, Refills: 0    Associated Diagnoses: Closed fracture of multiple ribs of right side, initial encounter; Fall, initial encounter      clopidogrel (PLAVIX) 75 mg tablet Take 1 tablet (75 mg total) by mouth daily  Qty: 90 tablet, Refills: 3    Associated Diagnoses: Left thalamic infarction (HCC)      Comfort EZ Pen Needles 32G X 4 MM MISC USE 3-4 AS DIRECTED  Qty: 100 each, Refills: 5    Associated Diagnoses: Controlled type 2 diabetes mellitus with diabetic neuropathy, with long-term current use of insulin (HCC)      Diclofenac Sodium (VOLTAREN) 1 % Apply 2 g topically in the morning and 2 g at noon and 2 g in the evening and 2 g before bedtime    Qty: 50 g, Refills: 0    Associated Diagnoses: Wrist injury      insulin detemir (Levemir FlexTouch) 100 Units/mL injection pen Inject 22 Units under the skin daily at bedtime  Qty: 15 mL, Refills: 5    Associated Diagnoses: Controlled type 2 diabetes mellitus with diabetic neuropathy, with long-term current use of insulin ([facility])      INSULIN SYRINGE 1CC/29G 29G X 1/2" 1 ML MISC by Does not apply route      Lancet Devices (Lancing Device) MISC USE AS DIRECTED  Qty: 1 each, Refills: 0    Associated Diagnoses: Controlled type 2 diabetes mellitus with diabetic neuropathy, with long-term current use of insulin ([facility])      Lancets MISC by Does not apply route      OneTouch Ultra test strip TEST UP TO 3 TIMES A DAY  Qty: 100 each, Refills: 0    Associated Diagnoses: Controlled type 2 diabetes mellitus without complication, with long-term current use of insulin ([facility])      rosuvastatin (CRESTOR) 40 MG tablet TAKE ONE (1) TABLET BY MOUTH DAILY  Qty: 30 tablet, Refills: 5    Associated Diagnoses: Mixed hyperlipidemia      tacrolimus (PROGRAF) 1 mg capsule Take 1 capsule (1 mg total) by mouth every 12 (twelve) hours  Qty: 180 capsule, Refills: 3    Associated Diagnoses: Liver transplanted ([facility])      tamsulosin (FLOMAX) 0 4 mg TAKE 1 CAPSULE(0 4 MG) BY MOUTH DAILY WITH DINNER  Qty: 90 capsule, Refills: 6    Comments: **Patient requests 90 days supply**  Associated Diagnoses: Weak urine stream; Benign prostatic hyperplasia without lower urinary tract symptoms      temazepam (RESTORIL) 30 mg capsule TAKE 1 CAPSULE (30 MG TOTAL) BY MOUTH DAILY AT BEDTIME  Qty: 30 capsule, Refills: 5    Associated Diagnoses: Insomnia, unspecified type      traMADol (ULTRAM) 50 mg tablet TAKE 1 TABLET (50 MG TOTAL) BY MOUTH EVERY 8 (EIGHT) HOURS AS NEEDED FOR MODERATE PAIN  Qty: 180 tablet, Refills: 0    Associated Diagnoses: 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 ([facility])      triamcinolone (KENALOG) 0 1 % cream APPLY TO AFFECTED AREA TWICE DAILY  Qty: 80 g, Refills: 5    Associated Diagnoses: Rash      zolpidem (AMBIEN) 10 mg tablet Take 1 tablet (10 mg total) by mouth daily at bedtime  Qty: 30 tablet, Refills: 5    Associated Diagnoses: Adjustment insomnia           No discharge procedures on file  PDMP Review       Value Time User    PDMP Reviewed  Yes 5/23/2022  4:52 PM Jatin Cheng DO           ED Provider  Attending physically available and evaluated Esteban Quiñones I managed the patient along with the ED Attending      Electronically Signed by         Rudy Joya MD  05/25/22 0474

## 2022-05-23 NOTE — H&P
[address] [facility]  H&P- Urvashi Renae 1950, 74 y o  male MRN: [id number]  Unit/Bed#: ED 21 Encounter: [id number]  Primary Care Provider: Rand Rihc DO   Date and time admitted to hospital: 5/23/2022  4:44 AM    * Gross hematuria  Assessment & Plan  · Gross hematuria as noted by patient with clots developing earlier this evening, and persistent  · Plan was to place 3 way catheter in ED to start bladder irrigation however this unfortunately could not be placed  · Consult Urology to assist with management  · Hold AP/AC  · UA to be obtained, low threshold to consider antibiotic therapy  · Trend serial hemoglobins    Stage 4 chronic kidney disease [facility])  Assessment & Plan  Lab Results   Component Value Date    EGFR 20 05/23/2022    EGFR 19 04/05/2022    EGFR 19 02/10/2022    CREATININE 2 94 (H) 05/23/2022    CREATININE 2 98 (H) 04/05/2022    CREATININE 3 06 (H) 02/10/2022   · Follows with [facility] nephrology  · Creatinine at baseline, monitor with BMP; hyperkalemia noted however hemolysis additionally noted and will follow-up repeat potassium  · Was referred back to [facility] transplant for potential renal transplant in future    Liver transplant status [facility])  Assessment & Plan  · History of liver transplant in 2003, maintained on Tacrolimus managed by [facility] transplant  · Continue home dose of Prograf 1 mg q 12 hours pending tacrolimus level; may need to reach out to [facility] to assist with dosing    Type 2 diabetes mellitus with diabetic peripheral angiopathy without gangrene, with long-term current use of insulin ([facility] [address] )  Assessment & Plan  Lab Results   Component Value Date    HGBA1C 7 2 (H) 02/10/2022       No results for input(s): POCGLU in the last 72 hours      Blood Sugar Average: Last 72 hrs:  ·  continue Levemir 22 units q h s , add correctional insulin with Accu-Cheks while inpatient  · Carb controlled diet  · Initiate hypoglycemia protocol  · Continue home dose Lyrica for neuropathy    History of CVA (cerebrovascular accident)  Assessment & Plan  · History of prior CVA, currently without new focal deficits  · Plavix on hold given gross hematuria      VTE Prophylaxis: Pharmacologic VTE Prophylaxis contraindicated due to Gross hematuria  / sequential compression device   Code Status:  DNAR per patient  POLST: POLST form is not discussed and not completed at this time  Discussion with family:  Girlfriend at bedside with patient permission    Anticipated Length of Stay:  Patient will be admitted on an Inpatient basis with an anticipated length of stay of  greater than 2 midnights  Justification for Hospital Stay: Please see detailed plans noted above  Chief Complaint:     Blood in urine  History of Present Illness:  Hanane Houser is a 74 y o  male who presented for evaluation of blood in urine developing earlier this evening  He has a past medical history significant for prior CVA maintained on Plavix, history of liver transplant maintained on tacrolimus, type 2 diabetes on insulin therapy, chronic kidney disease stage 4 following with Nephrology and was recently referred back to [facility] transplant for potential future renal transplant  Patient states he was in his normal state of health until approximately 0100H this evening when he woke to urinate and noted difficulty urinating with eventual production of bright red blood with clot into toilet  He attempted to sleep following this, however awoke a few hours later to urinate and noted recurrence of urine with bright red blood and now with suprapubic pain which prompted presentation  Is on Flomax for apparent prostate issues  He notes some suprapubic discomfort but denies any penile pain/injury/trauma/discharge, fevers/chills, chest pain/pressure, shortness of breath, other abdominal pain, nausea/vomiting/diarrhea, recent illnesses, dizziness/lightheadedness, or palpitations    During ED evaluation 3 way catheter for bladder irrigation was attempted to be placed however was unsuccessful, and patient is admitted for further management and workup of gross hematuria  Review of Systems:    Constitutional:  Denies fever or chills   Eyes:  Denies change in visual acuity   HENT:  Denies nasal congestion or sore throat   Respiratory:  Denies cough or shortness of breath   Cardiovascular:  Denies chest pain or edema   GI:  Denies abdominal pain, nausea, vomiting, bloody stools or diarrhea   :  Denies dysuria but endorses hematuria  Musculoskeletal:  Denies back pain or joint pain   Integument:  Denies rash   Neurologic:  Denies headache, focal weakness or sensory changes   Endocrine:  Denies polyuria or polydipsia   Lymphatic:  Denies swollen glands   Psychiatric:  Denies depression or anxiety     Past Medical and Surgical History:   Past Medical History:   Diagnosis Date    Alcoholism ([facility] )     Cerebral artery aneurysm     Change in mental state     last assessed 5/18/15; resolved 10/27/15    Diabetes mellitus [facility] )     Drug dependence ([facility] )     Fatigue     last assessed 1/26/15; resolved 5/24/16    Hepatitis [facility] discharge follow-up 12/21/2018    Kidney disease     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[facility] )     Liver transplant recipient [facility])     Renal disorder     Subdural hygroma     2/27/14; resolved 7/28/15    Thrombocytopenia (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    CATARACT EXTRACTION      CHOLECYSTECTOMY      FL LUMBAR PUNCTURE DIAGNOSTIC  12/14/2018    IR PICC LINE  12/14/2018    LIVER TRANSPLANTATION      ROTATOR CUFF REPAIR      SHOULDER SURGERY         Meds/Allergies:  No current facility-administered medications for this encounter      Current Outpatient Medications:     acetaminophen (TYLENOL) 500 mg tablet, Take 1 tablet (500 mg total) by mouth every 6 (six) hours as needed for mild pain, Disp: 30 tablet, Rfl: 0    clopidogrel (PLAVIX) 75 mg tablet, Take 1 tablet (75 mg total) by mouth daily, Disp: 90 tablet, Rfl: 3    Comfort EZ Pen Needles 32G X 4 MM MISC, USE 3-4 AS DIRECTED, Disp: 100 each, Rfl: 5    Diclofenac Sodium (VOLTAREN) 1 %, Apply 2 g topically in the morning and 2 g at noon and 2 g in the evening and 2 g before bedtime  , Disp: 50 g, Rfl: 0    insulin detemir (Levemir FlexTouch) 100 Units/mL injection pen, Inject 22 Units under the skin daily at bedtime, Disp: 15 mL, Rfl: 5    INSULIN SYRINGE 1CC/29G 29G X 1/2" 1 ML MISC, by Does not apply route, Disp: , Rfl:     Lancet Devices (Lancing Device) MISC, USE AS DIRECTED, Disp: 1 each, Rfl: 0    Lancets MISC, by Does not apply route, Disp: , Rfl:     OneTouch Ultra test strip, TEST UP TO 3 TIMES A DAY, Disp: 100 each, Rfl: 0    pregabalin (LYRICA) 50 mg capsule, TAKE 1 CAPSULE (50 MG TOTAL) BY MOUTH 3 (THREE) TIMES A DAY, Disp: 90 capsule, Rfl: 5    rosuvastatin (CRESTOR) 40 MG tablet, TAKE ONE (1) TABLET BY MOUTH DAILY, Disp: 30 tablet, Rfl: 5    tacrolimus (PROGRAF) 1 mg capsule, Take 1 capsule (1 mg total) by mouth every 12 (twelve) hours, Disp: 180 capsule, Rfl: 3    tamsulosin (FLOMAX) 0 4 mg, TAKE 1 CAPSULE(0 4 MG) BY MOUTH DAILY WITH DINNER, Disp: 90 capsule, Rfl: 6    temazepam (RESTORIL) 30 mg capsule, TAKE 1 CAPSULE (30 MG TOTAL) BY MOUTH DAILY AT BEDTIME, Disp: 30 capsule, Rfl: 5    traMADol (ULTRAM) 50 mg tablet, TAKE 1 TABLET (50 MG TOTAL) BY MOUTH EVERY 8 (EIGHT) HOURS AS NEEDED FOR MODERATE PAIN, Disp: 180 tablet, Rfl: 0    triamcinolone (KENALOG) 0 1 % cream, APPLY TO AFFECTED AREA TWICE DAILY, Disp: 80 g, Rfl: 5    zolpidem (AMBIEN) 10 mg tablet, Take 1 tablet (10 mg total) by mouth daily at bedtime, Disp: 30 tablet, Rfl: 5    Allergies:    Allergies   Allergen Reactions    Tequin [Gatifloxacin] Rash    Ciprofloxacin     Iv Contrast [Iodinated Diagnostic Agents]     Levofloxacin Rash    Lipitor [Atorvastatin] Rash     Rash and itching    Omnipaque [Iohexol]      History:  Marital Status: /Civil Union     Substance Use History:   Social History     Substance and Sexual Activity   Alcohol Use No     Social History     Tobacco Use   Smoking Status Never Smoker   Smokeless Tobacco Never Used   Tobacco Comment    former smoker per [employer]      Social History     Substance and Sexual Activity   Drug Use No    Comment: remotely quit drug use per [employer]        Family History:  Family History   Problem Relation Age of Onset    Hypertension Mother         benign essential     Lung cancer Father     Diabetes Sister     Cancer Brother     Stroke Other         cva - due to embolism of cerebra artery        Physical Exam:     Vitals:   Blood Pressure: 147/64 (05/23/22 0449)  Pulse: 73 (05/23/22 0449)  Temperature: 98 2 °F (36 8 °C) (05/23/22 0449)  Temp Source: Oral (05/23/22 0449)  Respirations: 18 (05/23/22 0449)  SpO2: 100 % (05/23/22 0449)    Constitutional:  Well developed, well nourished, no acute distress, non-toxic appearance   Eyes:  PERRL, conjunctiva normal   HENT:  Atraumatic, external ears normal, nose normal, oropharynx moist, no pharyngeal exudates  Neck- normal range of motion, no tenderness, supple   Respiratory:  No respiratory distress, normal breath sounds, no rales, no wheezing   Cardiovascular:  Normal rate, normal rhythm, no murmurs, no gallops, no rubs   GI:  Soft, nondistended, normal bowel sounds, suprapubic discomfort to palpation, no organomegaly, no mass, no rebound, no guarding   :  No costovertebral angle tenderness, meatus of penis with bright red blood surrounding but without discomfort to palpation or obvious injury visualized  Musculoskeletal:  No edema, no tenderness, no deformities   Back- no tenderness  Integument:  Well hydrated, no rash   Lymphatic:  No lymphadenopathy noted   Neurologic:  Alert &awake, communicative, CN 2-12 normal, normal motor function, normal sensory function, no focal deficits noted   Psychiatric:  Speech and behavior appropriate       Lab Results: I have personally reviewed pertinent reports  Results from last 7 days   Lab Units 05/23/22  0543   WBC Thousand/uL 7 59   HEMOGLOBIN g/dL 9 7*   HEMATOCRIT % 31 7*   PLATELETS Thousands/uL 162   NEUTROS PCT % 49   LYMPHS PCT % 34   MONOS PCT % 9   EOS PCT % 8*     Results from last 7 days   Lab Units 05/23/22  0543   POTASSIUM mmol/L 6 0*   CHLORIDE mmol/L 105   CO2 mmol/L 29   BUN mg/dL 42*   CREATININE mg/dL 2 94*   CALCIUM mg/dL 9 0   ALK PHOS U/L 85   ALT U/L 25   AST U/L 57*     Results from last 7 days   Lab Units 05/23/22  0543   INR  1 10       EKG:  Normal sinus rhythm HR 75    Imaging: I have personally reviewed pertinent reports  Colonoscopy    Result Date: 4/27/2022  Narrative: [address] [place] [address] Ave: 4/27/22 PHYSICIAN(S): Attending: Corrinne Alderman, MD Fellow: No Staff Documented INDICATION: History of colon polyps POST-OP DIAGNOSIS: See the impression below  HISTORY: Prior colonoscopy: 7 years ago  BOWEL PREPARATION: Miralax/Dulcolax PREPROCEDURE: Informed consent was obtained for the procedure, including sedation  Risks including but not limited to bleeding, infection, perforation, adverse drug reaction and aspiration were explained in detail  Also explained about less than 100% sensitivity with the exam and other alternatives  The patient was placed in the left lateral decubitus position  DETAILS OF PROCEDURE: Patient was taken to the procedure room where a time out was performed to confirm correct patient and correct procedure   The patient underwent monitored anesthesia care, which was administered by an anesthesia professional  The patient's blood pressure, heart rate, level of consciousness, oxygen and respirations were monitored throughout the procedure  A digital rectal exam was performed  A perianal exam was performed  The scope was introduced through the anus and advanced to the transverse colon  The quality of bowel preparation was evaluated using the [facility] Bowel Preparation Scale with scores of: right colon = not assessed, transverse colon = 1, left colon = 1  The total BBPS score was 2  Bowel prep was not adequate  The patient experienced no blood loss  The procedure was difficult due to poor preparation  The patient tolerated the procedure well  There were no apparent complications  Examination could not be done due to poor bowel prep  ANESTHESIA INFORMATION: ASA: III Anesthesia Type: Anesthesia type not filed in the log  MEDICATIONS: No administrations occurring from 1321 to 1329 on 04/27/22 FINDINGS: Poor bowel prep, limited examination of the left colon  The procedure was terminated EVENTS: Procedure Events Event Event Time ENDO SCOPE OUT TIME 4/27/2022  1:28 PM SPECIMENS: * No specimens in log * EQUIPMENT: Colonoscope -[id number]     Impression: Poor bowel prep  Incomplete examination RECOMMENDATION: Schedule repeat colonoscopy with a double dose of the prep  Call the office make an appointment   Mari Ingram MD     XR forearm 2 views LEFT    Result Date: 5/11/2022  Narrative: LEFT FOREARM INDICATION:   fall, forearm pain  COMPARISON:  None VIEWS:  XR FOREARM 2 VW LEFT Images: 2 FINDINGS: There is no acute fracture or dislocation  The osseous structures are demineralized  No significant degenerative changes  No lytic or blastic osseous lesion  There are atherosclerotic calcifications  Soft tissues are otherwise unremarkable  Impression: No acute osseous abnormality  Workstation performed: [id number]     XR wrist 3+ views LEFT    Result Date: 5/11/2022  Narrative: LEFT WRIST INDICATION:   left wrist pain, fall  COMPARISON:  None VIEWS:  XR WRIST 3+ VW LEFT Images: 3 FINDINGS: There is no acute fracture or dislocation   No significant degenerative changes  No lytic or blastic osseous lesion  There are atherosclerotic calcifications  Soft tissues are otherwise unremarkable  Impression: No acute osseous abnormality  Workstation performed: [id number]     CT head wo contrast    Result Date: 5/11/2022  Narrative: CT BRAIN - WITHOUT CONTRAST INDICATION:   Head trauma, minor (Age >= 65y) fall, headstrike on plavix  COMPARISON: MRI 6/6/2021 CT 5/23/2021 TECHNIQUE:  CT examination of the brain was performed  In addition to axial images, sagittal and coronal 2D reformatted images were created and submitted for interpretation  Radiation dose length product (DLP) for this visit:  905 31 mGy-cm   This examination, like all CT scans performed in the [facility], was performed utilizing techniques to minimize radiation dose exposure, including the use of iterative  reconstruction and automated exposure control  IMAGE QUALITY:  Diagnostic  FINDINGS: PARENCHYMA: Decreased attenuation is noted in periventricular and subcortical white matter demonstrating an appearance that is statistically most likely to represent mild microangiopathic change  Mild left occipital lobe encephalomalacia is present  No CT signs of acute infarction  No intracranial mass, mass effect or midline shift  No acute parenchymal hemorrhage  VENTRICLES AND EXTRA-AXIAL SPACES:  Normal for the patient's age  VISUALIZED ORBITS AND PARANASAL SINUSES:  Unremarkable  CALVARIUM AND EXTRACRANIAL SOFT TISSUES:  Patient is status post left frontal craniotomy  Impression: No acute intracranial abnormality  Workstation performed: [id number]     CT spine cervical without contrast    Result Date: 5/11/2022  Narrative: CT CERVICAL SPINE - WITHOUT CONTRAST INDICATION:   Neck trauma (Age >= 65y) fall headstrike  COMPARISON:  5/23/2021 TECHNIQUE:  CT examination of the cervical spine was performed without intravenous contrast   Contiguous axial images were obtained    Sagittal and coronal reconstructions were performed  Radiation dose length product (DLP) for this visit:  383 85 mGy-cm   This examination, like all CT scans performed in the [facility], was performed utilizing techniques to minimize radiation dose exposure, including the use of iterative  reconstruction and automated exposure control  IMAGE QUALITY:  Diagnostic  FINDINGS: ALIGNMENT:  Normal alignment of the cervical spine  No subluxation  VERTEBRAL BODIES:  No fracture  DEGENERATIVE CHANGES:  Mild multilevel cervical degenerative changes are noted without critical central canal stenosis  PREVERTEBRAL AND PARASPINAL SOFT TISSUES:  Unremarkable  THORACIC INLET:  Normal      Impression: No cervical spine fracture or traumatic malalignment  Workstation performed: [id number]         ** Please Note: Dragon 360 Dictation voice to text software was used in the creation of this document   **

## 2022-05-23 NOTE — ASSESSMENT & PLAN NOTE
Lab Results   Component Value Date    EGFR 20 05/23/2022    EGFR 19 04/05/2022    EGFR 19 02/10/2022    CREATININE 2 94 (H) 05/23/2022    CREATININE 2 98 (H) 04/05/2022    CREATININE 3 06 (H) 02/10/2022   · Follows with [facility] nephrology  · Creatinine currently at baseline of approximately 2 9  · Was referred back to [facility] for potential renal transplant in future  · Avoid nephrotoxins and hypotension   · Monitor BMP

## 2022-05-23 NOTE — PROGRESS NOTES
Deshaun Purcell's Internal Medicine Post Admit Check:     Date of visit: 05/23/22    The patient was admitted earlier to the [facility] Internal Medicine Service  Please see initial intake history and physical for detailed admission history  This is a supplemental update following a post admit checkup  Subjective: patient complaining of lower abdominal / bladder pain, reassured will order IV pain medication given NPO status  Briefly discussed CT findings with patient and daughter at bedside  Advised awaiting Urology evaluation to determine need for intervention such as cystoscopy for further evaluation       Exam:   General: awake and alert, uncomfortable due to pain  Heart: RRR   Lungs: effort normal, stable on room air   : urinal at bedside noted with bright red urine and clots     Assessment and Plan:     * Gross hematuria  Assessment & Plan  Gross hematuria as noted by patient with clots developing earlier this evening, and persistent  · Plan was to place 3 way catheter in ED to start bladder irrigation however this unfortunately could not be placed  · Consult Urology to assist with management  · UA with trace leukocytes and urine culture pending; low threshold to consider antibiotic therapy  · Trend serial hemoglobins  · Hold AP/AC    Hydronephrosis of right kidney  Assessment & Plan  CT AP: Persistent moderate right-sided hydroureteronephrosis without a discrete ureteral calculus  [place] is however a large bladder mass now seen which may reflect hematoma and/or neoplasm   There is possible soft tissue density extending into the right   ureterovesical junction     · Webb catheter unable to be inserted in ED as above  · Appreciate urology inputs   · Maintain NPO for possible intervention     Stage 4 chronic kidney disease [facility])  Assessment & Plan  Lab Results   Component Value Date    EGFR 20 05/23/2022    EGFR 19 04/05/2022    EGFR 19 02/10/2022    CREATININE 2 94 (H) 05/23/2022    CREATININE 2 98 (H) 04/05/2022 CREATININE 3 06 (H) 02/10/2022   · Follows with [facility] nephrology  · Creatinine currently at baseline of approximately 2 9  · Was referred back to [facility] transplant for potential renal transplant in future  · Avoid nephrotoxins and hypotension   · Monitor BMP    Liver transplant status ([facility] )  Assessment & Plan  · History of liver transplant in 2003, maintained on Tacrolimus managed by [facility] transplant  · Continue home dose of Prograf 1 mg q 12 hours  · Pending tacrolimus level; may need to reach out to [facility] to assist with dosing    History of CVA (cerebrovascular accident)  Assessment & Plan  · History of prior CVA, currently without new focal deficits  · Plavix on hold given gross hematuria, resume when ok with Urology     Type 2 diabetes mellitus with diabetic peripheral angiopathy without gangrene, with long-term current use of insulin ([facility] )  Assessment & Plan  Lab Results   Component Value Date    HGBA1C 7 2 (H) 02/10/2022       No results for input(s): POCGLU in the last 72 hours      Blood Sugar Average: Last 72 hrs:  · Continue Levemir 22 units q h s , add correctional insulin with Accu-Cheks while inpatient  · Carb controlled diet  · Initiate hypoglycemia protocol  · Continue home dose Lyrica for neuropathy        Redd Eller PA-C

## 2022-05-23 NOTE — ASSESSMENT & PLAN NOTE
· History of liver transplant in 2003, maintained on Tacrolimus managed by [facility] transplant  · Continue home dose of Prograf 1 mg q 12 hours pending tacrolimus level; may need to reach out to [facility] to assist with dosing

## 2022-05-23 NOTE — ASSESSMENT & PLAN NOTE
Lab Results   Component Value Date    HGBA1C 7 2 (H) 02/10/2022       No results for input(s): POCGLU in the last 72 hours      Blood Sugar Average: Last 72 hrs:  · Continue Levemir 22 units q h s , add correctional insulin with Accu-Cheks while inpatient  · Carb controlled diet  · Initiate hypoglycemia protocol  · Continue home dose Lyrica for neuropathy

## 2022-05-23 NOTE — ASSESSMENT & PLAN NOTE
Lab Results   Component Value Date    EGFR 20 05/23/2022    EGFR 19 04/05/2022    EGFR 19 02/10/2022    CREATININE 2 94 (H) 05/23/2022    CREATININE 2 98 (H) 04/05/2022    CREATININE 3 06 (H) 02/10/2022   · Follows with [facility] nephrology  · Creatinine at baseline, monitor with BMP; hyperkalemia noted however hemolysis additionally noted and will follow-up repeat potassium  · Was referred back to [facility] transplant for potential renal transplant in future

## 2022-05-23 NOTE — PROCEDURES
2022    Aster Galvez  1950  [id number]    Diagnosis  Chief Complaint     Blood in Urine          Pre-operative Diagnosis: gross hematuria, clot retention      Post-operative Diagnosis: same    Time Out: Verbal timeout performed, patient confirmed name, , procedure  No laterality applicable  Consent: Patient was agreeable and gave verbal consent before start of procedure  Plan  Insert large bore flores catheter for manual irrigation    Procedure: Flores Catheter Placement  Procedures    Patient placed in the supine position  Area prepped and draped with Betadine in the normal sterile fashion  Lidocaine urojet gel was introduced into the urethral meatus for lubrication  24F 3 way straight tip latex flores was inserted to the hub with moderate resistance, a single dose of fentanyl 100mcg IM was provided for procedural analgesia and tolerated much better  Danella Rattler blood returned and 10 mL sterile water was placed in the balloon  Flores drained 600 cc of dark red blood  Manual irrigation with 60cc piston syringe for total of 500cc cleared the bladder nicely of retained clot material until was clear punch color  Next, a 3L saline bag was primed for continuous bladder irrigation and set to flow rate moderate for clear light pink urine efflux  Patient tolerated the procedure well with help of IM fentanyl and family support at bedside  Flores attached to gravity drainage bag and CBI at the conclusion of the procedure, around 11:20 start time CBI  Complications:  None; patient tolerated the procedure well  Condition: stable      Plan  Continue slow to moderate continuous bladder irrigation throughout the day today with Q 4 hour manual irrigations for catheter patency using a 60 cc piston tip syringe and  cc of saline    He will be NPO past midnight tonight for OR tomorrow morning for cystoscopy, clot evacuation, TURBT of large mass, right retrograde pyelogram, possible ureteroscopy, possible ureteral biopsy/laser fulguration, ureteral stent insertion      Vilma Moore PA-C

## 2022-05-23 NOTE — CONSULTS
UROLOGY CONSULTATION NOTE     Patient Identifiers: Jose Angel Vaca (MRN [id number])  Service Requesting Consultation:  Internal Medicine  Service Providing Consultation:  Urology, Juancarlos Silva MD    Date of Service: 5/23/2022    Reason for Consultation:  Hematuria      ASSESSMENT:     58-year-old male, history of liver transplant, with gross hematuria and possible right distal ureteral and right ureterovesical junction abnormality     PLAN:     Appreciate the placement of the large-bore 3 way catheter and institution of continuous bladder irrigation  Patient's urine is now draining clear on moderate drip CBI  Patient is having significant bladder spasms  Will institute Ditropan and belladonna and opium suppositories  Appreciate the input of the gastroenterology service who have been consulted given the patient's history of liver transplant and need for optimization prior to operative intervention  I am concerned that there is an abnormality in the right ureterovesical junction potentially in the right distal ureter as evidenced by the films today as compared to 1 year prior  Have discussed with the patient and his significant other plan to move forward with cystoscopy, clot evacuation and possible transurethral resection of bladder tumor with ureteroscopy of the right distal ureter, potential ureteral biopsies and stent placement  Risks and benefits were discussed and described  Patient is agreeable to proceed with scheduling  Thank you for allowing me to participate in this patients care  Please do not hesitate to call with any additional questions  Juancarlos Silva MD    History of Present Illness:     Jose Angel Vaca is a 74 y o  old with a history of liver transplant remotely 20 years ago  Patient has been remotely followed by the [facility] team   Patient and his significant other did deny a history of urologic issues    Patient does have a history of being seen by a prior urologist in 2019 for issues with weak urinary stream   Was prescribed Flomax at that time  No additional follow-up  Patient does have a CT scan from May of 2021 which did raise some question or concern about bladder wall thickening in the right distal ureter  Does not report seeing a urologist after this film was completed  Patient began to have gross hematuria and bladder pain yesterday and presented with significant gross hematuria  Catheter placement in the emergency room was unsuccessful  Was seen earlier today by the Urology advanced practitioner team, 3 way Webb catheter was placed and continuous bladder irrigation was started after hand irrigation allow for clearing of clots  Patient continues to have significant suprapubic pain      Past Medical, Past Surgical History:     Past Medical History:   Diagnosis Date    Alcoholism ([facility] [address] )     Cerebral artery aneurysm     Change in mental state     last assessed 5/18/15; resolved 10/27/15    Diabetes mellitus ([facility] [address] )     Drug dependence ([facility] [address] )     Fatigue     last assessed 1/26/15; resolved 5/24/16    Hepatitis [facility] discharge follow-up 12/21/2018    Kidney disease     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[facility] [address] )     [facility])     Renal disorder     Subdural hygroma     2/27/14; resolved 7/28/15    Thrombocytopenia ([facility] [address] ) 9/20/2017   :    Past Surgical History:   Procedure Laterality Date    BRAIN SURGERY  02/12/2014    left frontotemporal 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    CATARACT EXTRACTION      CHOLECYSTECTOMY      FL LUMBAR PUNCTURE DIAGNOSTIC  12/14/2018    IR PICC LINE  12/14/2018    LIVER TRANSPLANTATION      ROTATOR CUFF REPAIR      SHOULDER SURGERY     :    Medications, Allergies:     Current Facility-Administered Medications:     acetaminophen (TYLENOL) tablet 650 mg, 650 mg, Oral, Q6H PRN, DO Julianna Florian-opium (B&O SUPPOSITORY) 16 2-30 mg suppository 1 suppository, 30 mg, Rectal, Q8H PRN, Marquis Beyer MD, 1 suppository at 05/23/22 1242    HYDROmorphone (DILAUDID) injection 0 5 mg, 0 5 mg, Intravenous, Q4H PRN, Noemy Padron PA-C    HYDROmorphone (DILAUDID) injection 1 mg, 1 mg, Intravenous, Q4H PRN, Noemy Padron PA-C    insulin detemir (LEVEMIR) subcutaneous injection 22 Units, 22 Units, Subcutaneous, HS, Lemon Hopping, DO    insulin lispro (HumaLOG) 100 units/mL subcutaneous injection 1-5 Units, 1-5 Units, Subcutaneous, TID AC **AND** Fingerstick Glucose (POCT), , , TID AC, Lemon Hopping, DO    insulin lispro (HumaLOG) 100 units/mL subcutaneous injection 1-5 Units, 1-5 Units, Subcutaneous, HS, Lemon Hopping, DO    ondansetron [facility]) injection 4 mg, 4 mg, Intravenous, Q6H PRN, Lemon Hopping, DO    oxybutynin (DITROPAN-XL) 24 hr tablet 5 mg, 5 mg, Oral, Daily, Marquis Beyer MD, 5 mg at 05/23/22 1242    pregabalin (LYRICA) capsule 50 mg, 50 mg, Oral, TID, Lemon Hopping, DO, 50 mg at 05/23/22 0845    tacrolimus (PROGRAF) capsule 1 mg, 1 mg, Oral, Q12H, Lemon Hopping, DO    tamsulosin (FLOMAX) capsule 0 4 mg, 0 4 mg, Oral, Daily With Dinner, Lemon Hopping, DO    temazepam (RESTORIL) capsule 30 mg, 30 mg, Oral, HS, Lemon Hopping, DO    zolpidem (AMBIEN) tablet 10 mg, 10 mg, Oral, HS, Lemon Hopping, DO    Allergies: Allergies   Allergen Reactions    Tequin [Gatifloxacin] Rash    Ciprofloxacin     Iv Contrast [Iodinated Diagnostic Agents]     Levofloxacin Rash    Lipitor [Atorvastatin] Rash     Rash and itching    Omnipaque [Iohexol]    :    Social and Family History:   Social History:   Social History     Tobacco Use    Smoking status: Never Smoker    Smokeless tobacco: Never Used    Tobacco comment: former smoker per [employer]    Vaping Use    Vaping Use: Never used   Substance Use Topics    Alcohol use: Not Currently    Drug use: No     Comment: remotely quit drug use per [employer]          Social History     Tobacco Use   Smoking Status Never Smoker   Smokeless Tobacco Never Used   Tobacco Comment    former smoker per [employer]        Family History:  Family History   Problem Relation Age of Onset    Hypertension Mother         benign essential     Lung cancer Father     Diabetes Sister     Cancer Brother     Stroke Other         cva - due to embolism of cerebra artery    :     Review of Systems:     General: Fever, chills, or night sweats: negative  Neurologic:  No weakness, no loss of consciousness  Cardiac: Negative for chest pain  Pulmonary: Negative for shortness of breath  Gastrointestinal: Abdominal pain positive  Nausea, vomiting, or diarrhea negative,  Genitourinary: See HPI above  Patient does have hematuria  Musculoskeletal:  Full range of motion all 4 extremities  Dermatologic:  Pallor  All other systems queried were negative  Physical Exam:   General: Patient is pleasant but slightly uncomfortable  Neurologic:  Focally intact  /73   Pulse 73   Temp 98 °F (36 7 °C)   Resp 18   SpO2 100%   Cardiac: Peripheral edema: negative  Pulmonary: Non-labored breathing  Abdomen: Soft, non-tender, non-distended  healed Chevron scar  No masses, hernias noted  suprapubic tenderness  Genitourinary: Negative CVA tenderness, positive suprapubic tenderness  (Male): Penis uncircumcised, phallus normal, meatus patent  Testicles descended into scrotum bilaterally without masses nor tenderness  No inguinal hernias bilaterally  CHICAS: draining pink clear urine CBI running    Labs:     Lab Results   Component Value Date    HGB 9 7 (L) 05/23/2022    HCT 31 7 (L) 05/23/2022    WBC 7 59 05/23/2022     05/23/2022   ]    Lab Results   Component Value Date     12/17/2015    K 4 5 05/23/2022     05/23/2022    CO2 29 05/23/2022    BUN 42 (H) 05/23/2022    CREATININE 2 94 (H) 05/23/2022    CALCIUM 9 0 05/23/2022    GLUCOSE 145 (H) 05/23/2021   ]    Imaging:    I personally reviewed the images and report of the following studies, and reviewed them with the patient:    5/23/22  CT ABDOMEN AND PELVIS WITHOUT IV CONTRAST     INDICATION:   Hematuria      COMPARISON:  5/23/2021      TECHNIQUE:  CT examination of the abdomen and pelvis was performed without intravenous contrast  This examination was performed without intravenous contrast in the context of the critical nationwide Omnipaque shortage  Axial, sagittal, and coronal 2D   reformatted images were created from the source data and submitted for interpretation       Radiation dose length product (DLP) for this visit:  334 55 mGy-cm   This examination, like all CT scans performed in the [facility], was performed utilizing techniques to minimize radiation dose exposure, including the use of iterative   reconstruction and automated exposure control       Enteric contrast was not administered       FINDINGS:     ABDOMEN     LOWER CHEST:  There is linear atelectasis in the lingula and left lower lobes  Minimal dependent atelectasis noted in the lower lobes      LIVER/BILIARY TREE:  Unremarkable      GALLBLADDER:  Status post cholecystectomy      SPLEEN:  Unremarkable      PANCREAS:  Atrophic as before      ADRENAL GLANDS:  Unremarkable      KIDNEYS/URETERS:  There is persistent moderate right-sided hydroureteronephrosis without a discrete obstructing ureteral calculus seen  There is however a large bladder mass which measures approximately 7 4 x 6 6 cm  Differential considerations would   include a large hematoma and/or neoplasm  There is possible soft tissue mass extending into the right ureterovesical junction  Previously there was hydronephrosis as well with suspected mild bladder wall thickening along the right posterior bladder   wall  No hydronephrosis on the left  No CT evidence of nephrolithiasis  There is mild bilateral renal cortical thinning      STOMACH AND BOWEL:  No bowel obstruction    No focal inflammatory process      APPENDIX:  A normal appendix was visualized      ABDOMINOPELVIC CAVITY:  No ascites  No pneumoperitoneum  No lymphadenopathy      VESSELS:  The abdominal aorta is calcified but within normal limits for caliber  No retroperitoneal hematoma      PELVIS     REPRODUCTIVE ORGANS:  Prostatic calcifications are seen      URINARY BLADDER:  As mentioned above in the kidney /ureter section      ABDOMINAL WALL/INGUINAL REGIONS:  Inflammatory stranding in the lower anterior abdominal wall, unchanged and may reflect scarring      OSSEOUS STRUCTURES:  Prominent Schmorl's node inferior endplate of L2  Additional multifocal Schmorl's nodes are seen in the lower thoracic spine  Old fracture deformity is of the right posterior 8th through 10th ribs      IMPRESSION:     Persistent moderate right-sided hydroureteronephrosis without a discrete ureteral calculus  There is however a large bladder mass now seen which may reflect hematoma and/or neoplasm  There is possible soft tissue density extending into the right   ureterovesical junction  This can be further evaluated with cystoscopy  5/23/2021  CT CHEST, ABDOMEN AND PELVIS WITHOUT IV CONTRAST     INDICATION:   Trauma level B  Status post fall, striking head, on anticoagulant therapy     COMPARISON:  12/11/2018     TECHNIQUE: CT examination of the chest, abdomen and pelvis was performed without intravenous contrast   Axial, sagittal, and coronal 2D reformatted images were created from the source data and submitted for interpretation      Radiation dose length product (DLP) for this visit:  548 89 mGy-cm     This examination, like all CT scans performed in the [facility], was performed utilizing techniques to minimize radiation dose exposure, including the use of iterative   reconstruction and automated exposure control       Enteric contrast was not administered       FINDINGS:     CHEST     LUNGS:  Scattered areas of subsegmental atelectasis      PLEURA:  No pneumothorax  Small right and trace left effusions     HEART/GREAT VESSELS:  Coronary artery calcification, no pericardial effusion     MEDIASTINUM AND JOHN:  Unremarkable      CHEST WALL AND LOWER NECK:   Unremarkable      ABDOMEN     LIVER/BILIARY TREE:  Status post liver transplant  No evidence of acute traumatic injury on this unenhanced study     GALLBLADDER:  Gallbladder is surgically absent      SPLEEN:  Unremarkable      PANCREAS:  Unremarkable      ADRENAL GLANDS:  Unremarkable      KIDNEYS/URETERS:  Left kidney is unremarkable  Right-sided hydronephrosis has developed since previous examination  Moderately distended intrarenal collecting system and right ureter, with transition at UVJ  Dense calcification seen in the prostate   gland but unchanged from prior study at which time there was no hydronephrosis  No definite radiopaque ureteral stone      STOMACH AND BOWEL:  Unremarkable      APPENDIX:  No findings to suggest appendicitis      ABDOMINOPELVIC CAVITY:  No ascites  No pneumoperitoneum  No lymphadenopathy      VESSELS:  Unremarkable for patient's age      PELVIS     REPRODUCTIVE ORGANS:  Dense calcification in prostate gland as on prior     URINARY BLADDER:  Questionable right posterior bladder wall thickening      ABDOMINAL WALL/INGUINAL REGIONS:  Unremarkable      OSSEOUS STRUCTURES:  Nondisplaced fractures of the posterior right 8th and 9th ribs noted  Mild compression deformity, L2, not present on prior study  No surrounding paravertebral soft tissue swelling     IMPRESSION:     1  Nondisplaced fractures, posterior right 8th and 9th ribs  Mild compression deformity of the L2 vertebral body not present in 2018  2  Small right and trace left effusion  No pneumothorax  3  No evidence of acute traumatic soft tissue injury in the chest abdomen or pelvis  4  Development of right-sided hydronephrosis of unclear etiology    However, there may be subtle right posterior bladder wall thickening  Nonemergent urological consultation is recommended

## 2022-05-23 NOTE — CONSULTS
GASTROENTEROLOGY CONSULT NOTE     PATIENT INFORMATION     Name: Kathe Ray   Age & Sex: 74 y o  male   MRN: [id number]  Hospital Stay Days: 0  Unit/Bed#: -01   Encounter: [id number]    ASSESSMENT/PLAN     Principal Problem:    Gross hematuria  Active Problems:    History of CVA (cerebrovascular accident)    Liver transplant status ([facility] )    Stage 4 chronic kidney disease ([facility] )    Type 2 diabetes mellitus with diabetic peripheral angiopathy without gangrene, with long-term current use of insulin ([facility] )    Hydronephrosis of right kidney    1  S/p Liver Transplant 2003  Patient with history of alcoholic cirrhosis, now s/p transplant at [facility] in 2003 without further complication  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 since 1 year prior to transplant  Patient and significant other at bedside report continue to be followed by [facility] for tacro level as well as Dr Jeannette Sanchez (GI) and Dr Brian Bradford (PCP)  · No recent Tacrolimus level since 2/10/22 at which time is was 1 7  · Tacrolimus level pending  · Continue tacrolimus perioperatively - adjust dose based on repeat level  · With plan for possible tumor resection and upper tract evaluation with ureteroscopy - would recommend perioperative antibiotics as normally prescribed prior to urology procedure per performing physician  · No further intervention/recommendations at this time    2  Gross Hematuria  Patient presenting with 1 day of gross hematuria and CT scan demonstrating large bladder mass (hematoma vs  Neoplasm) with possible soft tissue density extending into R UVJ  · Patient currently with CBI running  · Urology consulted - planning for possible tumor resection with upper tract evaluation/ureteroscopy tomorrow    3  Stage 4 CKD  Thought to be possibly 2/2 ongoing Tacrolimus usage  Following with Dr Margarette Chin for CKD  Has been referred for kidney transplant     · Follow up outpatient with [place] transplant team in regards to kidney transplant evaluation    VTE Pharmacologic Prophylaxis: held 2/2 hematuria  VTE Mechanical Prophylaxis: sequential compression device    HISTORY OF PRESENT ILLNESS   Reason for Admission / Principal Problem: gross hematuria  Reason for Consult: s/p liver transplant periop kadeem Lockhart 74 y o  male with PMH significant for history of CVA, Stage 4 CKS, T2DM, history of liver transplant now on tacrolimus who presented to the ED on 5/23 for complaint of hematuria  Patient reported awoke from sleep and noted some difficulty with urination followed by bright red blood in his urine  He noted worsening suprapubic pain, and continued hematuria leading to presentation to ED  CT scan done in ED demonstrated R-sided hydroureteronephrosis with large bladder mass measuring 7 4cmx6 6cm concerning for hematoma vs  Neoplasm and possible soft tissue mass extending to right UVJ  Urology consulted for placement of 3-way CBI catheter  Planning for OR with urology tomorrow for possible tumor resection as well as upper tract evaluation tomorrow  GI has been consulted for evaluation of possible concern for immunocompromise/infectious issues in setting of previous liver transplant  SUBJECTIVE     Patient seen and evaluated at bedside  Currently only complain lower abdominal/suprapubic pain  States woke from sleep with hematuria this late evening/early morning prior to presentation, and significant other brought him to hospital     Discussed with patient and girlfriend at bedside, and patient reports no issues after liver transplant  Remains on tacrolimus, followed by [facility]  Has been clean/sober since 1 year prior to transplant  REVIEW OF SYSTEMS   Review of Systems   Constitutional: Negative for chills, fatigue and fever  HENT: Negative for ear pain, rhinorrhea, sneezing, sore throat, tinnitus and trouble swallowing  Eyes: Negative for pain and visual disturbance  Respiratory: Negative for cough and shortness of breath  Cardiovascular: Negative for chest pain, palpitations and leg swelling  Gastrointestinal: Positive for abdominal pain (suprapubic)  Negative for constipation, diarrhea, nausea and vomiting  Endocrine: Negative for polydipsia and polyuria  Genitourinary: Positive for difficulty urinating and hematuria  Negative for dysuria  Musculoskeletal: Negative for arthralgias and back pain  Skin: Negative for color change and rash  Neurological: Negative for dizziness, weakness, light-headedness and headaches  Psychiatric/Behavioral: Negative for confusion  The patient is not nervous/anxious  OBJECTIVE     Vitals:    22 0449 22 0747   BP: 147/64 137/73   BP Location: Right arm    Pulse: 73    Resp: 18    Temp: 98 2 °F (36 8 °C) 98 °F (36 7 °C)   TempSrc: Oral    SpO2: 100%       Temperature:   Temp (24hrs), Av 1 °F (36 7 °C), Min:98 °F (36 7 °C), Max:98 2 °F (36 8 °C)    Temperature: 98 °F (36 7 °C)  Intake & Output:  I/O     None        Weights: There is no height or weight on file to calculate BMI  Weight (last 2 days)     None        Physical Exam  Vitals reviewed  Constitutional:       General: He is awake  He is not in acute distress  Appearance: Normal appearance  He is well-developed  He is not ill-appearing, toxic-appearing or diaphoretic  HENT:      Head: Normocephalic and atraumatic  Right Ear: External ear normal       Left Ear: External ear normal       Nose: Nose normal       Mouth/Throat:      Mouth: Mucous membranes are moist       Pharynx: Oropharynx is clear  Eyes:      General:         Right eye: No discharge  Left eye: No discharge  Conjunctiva/sclera: Conjunctivae normal       Pupils: Pupils are equal, round, and reactive to light  Cardiovascular:      Rate and Rhythm: Normal rate and regular rhythm  Pulmonary:      Effort: Pulmonary effort is normal  No respiratory distress  Breath sounds: Normal breath sounds     Abdominal: General: Bowel sounds are normal  There is no distension  Palpations: Abdomen is soft  Tenderness: There is abdominal tenderness (suprapubic)  Comments: Scarring from prior abdominal surgery noted   Genitourinary:     Comments: CBI flores in place with fruit punch colored urine in flores bag  Musculoskeletal:         General: No swelling or tenderness  Normal range of motion  Cervical back: Normal range of motion  Right lower leg: No edema  Left lower leg: No edema  Skin:     General: Skin is warm and dry  Coloration: Skin is not jaundiced  Findings: No bruising  Neurological:      General: No focal deficit present  Mental Status: He is alert and oriented to person, place, and time  Motor: No weakness  Psychiatric:         Mood and Affect: Mood normal          Behavior: Behavior normal  Behavior is cooperative  Thought Content:  Thought content normal        PAST MEDICAL HISTORY     Past Medical History:   Diagnosis Date    [facility]ism ([facility] )     Cerebral artery aneurysm     Change in mental state     last assessed 5/18/15; resolved 10/27/15    Diabetes mellitus ([facility] )     Drug dependence ([facility] )     Fatigue     last assessed 1/26/15; resolved 5/24/16    Hepatitis 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    CATARACT EXTRACTION      CHOLECYSTECTOMY      FL LUMBAR PUNCTURE DIAGNOSTIC  12/14/2018    IR PICC LINE  12/14/2018    LIVER TRANSPLANTATION      ROTATOR CUFF REPAIR      SHOULDER SURGERY       SOCIAL & FAMILY HISTORY Social History     Substance and Sexual Activity   Alcohol Use Not Currently     Social History     Substance and Sexual Activity   Drug Use No    Comment: remotely quit drug use per allscripts      Social History     Tobacco Use   Smoking Status Never Smoker   Smokeless Tobacco Never Used   Tobacco Comment    former smoker per allscripts      Family History   Problem Relation Age of Onset    Hypertension Mother         benign essential     Lung cancer Father     Diabetes Sister     Cancer Brother     Stroke Other         cva - due to embolism of cerebra artery      LABORATORY DATA     Labs: I have personally reviewed pertinent reports  Results from last 7 days   Lab Units 05/23/22  0543   WBC Thousand/uL 7 59   HEMOGLOBIN g/dL 9 7*   HEMATOCRIT % 31 7*   PLATELETS Thousands/uL 162   NEUTROS PCT % 49   MONOS PCT % 9      Results from last 7 days   Lab Units 05/23/22  0638 05/23/22  0543   POTASSIUM mmol/L 4 5 6 0*   CHLORIDE mmol/L  --  105   CO2 mmol/L  --  29   BUN mg/dL  --  42*   CREATININE mg/dL  --  2 94*   CALCIUM mg/dL  --  9 0   ALK PHOS U/L  --  85   ALT U/L  --  25   AST U/L  --  57*              Results from last 7 days   Lab Units 05/23/22  0543   INR  1 10             Micro:  Lab Results   Component Value Date    BLOODCX No Growth After 5 Days  12/12/2018    BLOODCX No Growth After 5 Days  12/12/2018    BLOODCX No Growth After 5 Days  12/11/2018    URINECX 80,000-89,000 cfu/ml Staphylococcus epidermidis (A) 07/19/2019     IMAGING & DIAGNOSTIC TESTS     Imaging: I have personally reviewed pertinent reports  CT abdomen pelvis wo contrast    Result Date: 5/23/2022  Impression: Persistent moderate right-sided hydroureteronephrosis without a discrete ureteral calculus  There is however a large bladder mass now seen which may reflect hematoma and/or neoplasm  There is possible soft tissue density extending into the right ureterovesical junction    This can be further evaluated with cystoscopy  Workstation performed: [id number]     EKG, Pathology, and Other Studies: I have personally reviewed pertinent reports  ALLERGIES     Allergies   Allergen Reactions    Tequin [Gatifloxacin] Rash    Ciprofloxacin     Iv Contrast [Iodinated Diagnostic Agents]     Levofloxacin Rash    Lipitor [Atorvastatin] Rash     Rash and itching    Omnipaque [Iohexol]      MEDICATIONS PRIOR TO ARRIVAL     Prior to Admission medications    Medication Sig Start Date End Date Taking? Authorizing Provider   acetaminophen (TYLENOL) 500 mg tablet Take 1 tablet (500 mg total) by mouth every 6 (six) hours as needed for mild pain 5/23/21   Soto Sánchez MD   clopidogrel (PLAVIX) 75 mg tablet Take 1 tablet (75 mg total) by mouth daily 8/30/21   Adriana Fagan, DO   Comfort EZ Pen Needles 32G X 4 MM MISC USE 3-4 AS DIRECTED 5/17/22   Adriana Fagan, DO   Diclofenac Sodium (VOLTAREN) 1 % Apply 2 g topically in the morning and 2 g at noon and 2 g in the evening and 2 g before bedtime   5/11/22   Valeriy A Paster, DO   insulin detemir (Levemir FlexTouch) 100 Units/mL injection pen Inject 22 Units under the skin daily at bedtime 8/30/21   Adriana Fagan, DO   INSULIN SYRINGE 1CC/29G 29G X 1/2" 1 ML MISC by Does not apply route 1/11/12   Historical Provider, MD   Lancet Devices (Lancing Device) MISC USE AS DIRECTED 4/18/22   Adriana Fagan, DO   Lancets MISC by Does not apply route 5/12/17   Historical Provider, MD   OneTouch Ultra test strip TEST UP TO 3 TIMES A DAY 1/5/22   Adriana Fagan, DO   pregabalin (LYRICA) 50 mg capsule TAKE 1 CAPSULE (50 MG TOTAL) BY MOUTH 3 (THREE) TIMES A DAY 4/17/22   Adriana Fagan, DO   rosuvastatin (CRESTOR) 40 MG tablet TAKE ONE (1) TABLET BY MOUTH DAILY 12/18/21   Adriana Fagan, DO   tacrolimus (PROGRAF) 1 mg capsule Take 1 capsule (1 mg total) by mouth every 12 (twelve) hours 8/30/21   Adriana Fagan, DO   tamsulosin (FLOMAX) 0 4 mg TAKE 1 CAPSULE(0 4 MG) BY MOUTH DAILY WITH DINNER 11/11/19   Stephanie Chen MD   temazepam (RESTORIL) 30 mg capsule TAKE 1 CAPSULE (30 MG TOTAL) BY MOUTH DAILY AT BEDTIME 1/19/22   Niraj Hasten, DO   traMADol (ULTRAM) 50 mg tablet TAKE 1 TABLET (50 MG TOTAL) BY MOUTH EVERY 8 (EIGHT) HOURS AS NEEDED FOR MODERATE PAIN 5/11/22   Green Hasten, DO   triamcinolone (KENALOG) 0 1 % cream APPLY TO AFFECTED AREA TWICE DAILY 5/19/22   Niraj Hasten, DO   zolpidem (AMBIEN) 10 mg tablet Take 1 tablet (10 mg total) by mouth daily at bedtime 3/1/22   Niraj Amin, DO     MEDICATIONS ADMINISTERED IN LAST 24 HOURS     Medication Administration - last 24 hours from 05/22/2022 1237 to 05/23/2022 1237       Date/Time Order Dose Route Action Action by     05/23/2022 0845 pregabalin (LYRICA) capsule 50 mg 50 mg Oral Given Shilpi Dickerson RN     05/23/2022 0858 tacrolimus (PROGRAF) capsule 1 mg 0 mg Oral Hold Shilpi Dickerson RN     05/23/2022 0845 traMADol (ULTRAM) tablet 50 mg 50 mg Oral Given Shilpi Dickerson RN     05/23/2022 1112 insulin lispro (HumaLOG) 100 units/mL subcutaneous injection 1-5 Units 1 Units Subcutaneous Not Given Shilpi Dickerson RN     05/23/2022 0845 insulin lispro (HumaLOG) 100 units/mL subcutaneous injection 1-5 Units 1 Units Subcutaneous Not Given Shilpi Dickerson RN     05/23/2022 1019 HYDROmorphone (DILAUDID) injection 1 mg 1 mg Intravenous Not Given Shilpi Dickerson RN     05/23/2022 1114 fentanyl citrate (PF) 100 MCG/2ML 100 mcg 100 mcg Intramuscular Given Shilpi Dickerson RN        CURRENT MEDICATIONS     Current Facility-Administered Medications   Medication Dose Route Frequency Provider Last Rate    acetaminophen  650 mg Oral Q6H PRN [place] Fries, DO      belladonna-opium  30 mg Rectal Q8H PRN Anh Lang MD      HYDROmorphone  0 5 mg Intravenous Q4H PRN Noemy E Held, PA-C      HYDROmorphone  1 mg Intravenous Q4H PRN Noemy E Held, PA-C      insulin detemir  22 Units Subcutaneous HS [place] Fries, DO      insulin lispro  1-5 Units Subcutaneous TID AC Chi Senait, DO      insulin lispro  1-5 Units Subcutaneous HS Chi Senait, DO      LORazepam  0 5 mg Oral Once Rishabh Arana MD      ondansetron  4 mg Intravenous Q6H PRN Chi Senait, DO      oxybutynin  5 mg Oral Daily Rishabh Arana MD      pregabalin  50 mg Oral TID Chi Senait, DO      tacrolimus  1 mg Oral Q12H Chi Senait, DO      tamsulosin  0 4 mg Oral Daily With [place] [employer] Jason, DO      temazepam  30 mg Oral HS Chi Senait, DO      zolpidem  10 mg Oral HS Chi Senait, DO          acetaminophen, 650 mg, Q6H PRN  belladonna-opium, 30 mg, Q8H PRN  HYDROmorphone, 0 5 mg, Q4H PRN  HYDROmorphone, 1 mg, Q4H PRN  ondansetron, 4 mg, Q6H PRN        Portions of the record may have been created with voice recognition software  Occasional wrong word or "sound a like" substitutions may have occurred due to the inherent limitations of voice recognition software    Read the chart carefully and recognize, using context, where substitutions have occurred     ==  Nicky Marshall, [facility]  Internal Medicine Residency

## 2022-05-23 NOTE — ASSESSMENT & PLAN NOTE
CT AP: Persistent moderate right-sided hydroureteronephrosis without a discrete ureteral calculus  Faron Jauregui is however a large bladder mass now seen which may reflect hematoma and/or neoplasm  Nikunj Jauregui is possible soft tissue density extending into the right   ureterovesical junction     · Webb catheter unable to be inserted in ED as above  · Appreciate urology inputs   · Maintain NPO for possible intervention

## 2022-05-23 NOTE — ED NOTES
3 way flores insertion attempt unsuccessful    Dr Alfie Dexter notified     Teddy Lewis, RN  05/23/22 8097

## 2022-05-23 NOTE — ASSESSMENT & PLAN NOTE
· History of prior CVA, currently without new focal deficits  · Plavix on hold given gross hematuria, resume when ok with Urology

## 2022-05-23 NOTE — ASSESSMENT & PLAN NOTE
· History of prior CVA, currently without new focal deficits  · Plavix on hold given gross hematuria

## 2022-05-23 NOTE — DISCHARGE INSTRUCTIONS
Nephrostomy Tube Care     WHAT YOU NEED TO KNOW:   A nephrostomy tube is a catheter (thin plastic tube) that is inserted through your skin and into your kidney  The nephrostomy tube drains urine from your kidney into a collecting bag outside your body  You may need a nephrostomy tube when something is blocking the normal flow of urine  A nephrostomy tube may be used for a short or a long period of time  The nephrostomy tube comes out of your back, so you will need someone to help care for your nephrostomy tube  DISCHARGE INSTRUCTIONS:      How to clean the skin around the nephrostomy tube and change the bandage:  Since the nephrostomy tube comes out of your back, you will not be able to care for it by yourself  Ask someone to follow the general directions below to check and care for your nephrostomy tube  Gather the items you will need  Disposable (single use) under-pad, and a clean washcloth  Plain soap, warm water, and new medical gloves  Sterile gauze bandages  Clear adhesive dressing or medical tape  Skin barrier  Protective skin film  Trash bag  Remove the old bandage, and check the tube entry site  Have the patient lie on his side with the nephrostomy tube entry site facing up  Place the under-pad where it will catch drainage as you are working with the nephrostomy tube  Wash your hands with soap and water  Put on new medical gloves  Gently remove the old bandage, without pulling on the tube  Do this by holding the skin beside the tube with one hand  With the other hand, gently remove sticky tape and the skin barrier by pulling in the same direction as hair growth  Do not touch the side of the bandage that is placed over or around the tube  Throw the bandage and skin barrier away in a trash bag  Look for signs of infection, such as skin redness and swelling  Report any skin changes to healthcare providers  Clean the tube entry site      Hold the tube in place to keep it from being pulled out while you are cleaning around it  You will need to clean the area twice  For the first cleaning, wet a new gauze bandage with soap and water  Begin at the entry site of the tube  Wipe the skin in circles, moving away from the entry site  Remove blood and any other material with the gauze  Do this as often as needed  Use a new gauze bandage each time you clean the area, moving away from the entry site  For the second cleaning, wet a new gauze bandage with water  Begin at the entry site of the tube  Wipe the skin in circles, moving away from the entry site  Use a new gauze bandage each time you clean the area, moving away from the entry site  Gently pat the skin with a clean washcloth to dry it  Apply the skin barrier and bandages  Roll up a bandage to make it thick, and place it under  the place where the tube enters the skin  Place it to support the tube, and stop it from kinking or bending  Tape the bandage in place, and apply more bandages if directed by a healthcare provider  Bring the tubing forward to the front and tape it to the skin  Do not stretch the tube tight, because this may pull the nephrostomy tube out  How often to change the bandage  Change the bandage around the tube, every other day  If your bandages  get dirty or wet, change them right away, and as often as needed  If your nephrostomy tube is to be used for a long period of time, the tube needs to be changed every 2 to 3 months  Healthcare providers will tell you when you need to make an appointment to have your tube changed  How to care for the urine drainage bag:   Ask if you need to measure and write down how much urine is in the bag before you empty it  Drain urine out of the drainage bag when it is ½ to ? full  Open the spout at the bottom of the bag to empty the urine into the toilet  You may need to detach the drainage bag from the nephrostomy tube to change it    If so, attach a new drainage bag tightly to the nephrostomy tube  How to prevent problems with your nephrostomy tube:   Change bandages, directed  This helps to prevent infection  Throw away or clean your drainage bag as directed by your healthcare provider  Wipe the connecting ends of the drainage bag with alcohol before you reconnect the bag to the tube  This helps prevent infection  Keep the tube taped to your skin and connected to a drainage bag placed below the level of your kidneys  This helps prevent urine from backing up into your kidneys  You may wear a small drainage bag strapped to your leg to let you move around more easily  Check the catheter to be sure it is in place after you change your clothes or do other activities  Do not wear tight clothing over the tube  Place the tubing over your thigh rather than under it when you are sitting down  Be sure that nothing is pulling on the nephrostomy tube when you move around  Change positions if you see little or no urine in your drainage bag  Check to see if the urine tube is twisted or bent  Be sure that you are not sitting or lying on the tube  If there are no kinks and there is little or no urine in the drainage bag, tell your healthcare provider  Flush out the tube as directed  Some tubes get flushed one time a day with 10 mls of NSS You will be given a prescription for the flushes  To flush the nephrostomy tube, clean both connections with alcohol swap  Twist off the drainage bag tube and twist the saline syringe into the nephrostomy tube and flush briskly  Remove the syringe and twist the drainage bag tube back into the nephrostomy tube  Keep the site covered while you shower  Tape a piece of clear adhesive plastic over the dressing to keep it dry while you shower  Do not take tub baths      Contact Interventional Radiology at [phone] [place] PATIENTS: Contact Interventional Radiology at [phone]) Niyah Velez PATIENTS: Contact Interventional Radiology at [phone]) if:  The skin around the nephrostomy tube is red, swollen, itches, or has a rash  You have a fever greater than 101 or chills  You have lower back or hip pain  There are changes in how your urine looks or smells  You have little or no urine draining from the nephrostomy tube  You have nausea and are vomiting  The black jennifer on your tube has moved, or the tube is longer than when it was put in  You have questions or concerns about your condition or care  The nephrostomy tube comes out completely  There is blood, pus, or a bad smell coming from the place where the tube enters your skin  Urine is leaking around the tube  The following pharmacies carry the flush syringes  [facility]                     Psychiatric       [address]  Phone [phone]            Phone [phone]  [address] & [facility]                      [address]  [place]                                 [phone]  Phone [phone]            Phone [phone]    [facility]                                                                         [facility] [phone]  [address]   Phone [phone]

## 2022-05-23 NOTE — ASSESSMENT & PLAN NOTE
Gross hematuria as noted by patient with clots developing earlier this evening, and persistent  · Plan was to place 3 way catheter in ED to start bladder irrigation however this unfortunately could not be placed  · Consult Urology to assist with management  · UA with trace leukocytes and urine culture pending; low threshold to consider antibiotic therapy  · Trend serial hemoglobins  · Hold AP/AC

## 2022-05-23 NOTE — ED ATTENDING ATTESTATION
5/23/2022  IClaribel MD, saw and evaluated the patient  I have discussed the patient with the resident/non-physician practitioner and agree with the resident's/non-physician practitioner's findings, Plan of Care, and MDM as documented in the resident's/non-physician practitioner's note, except where noted  All available labs and Radiology studies were reviewed  I was present for key portions of any procedure(s) performed by the resident/non-physician practitioner and I was immediately available to provide assistance  At this point I agree with the current assessment done in the Emergency Department  I have conducted an independent evaluation of this patient a history and physical is as follows:    ED Course     Emergency Department Note- Radha Fitzgerald 74 y o  male MRN: [id number]    Unit/Bed#: [facility] Encounter: [id number]    Radha Fitzgerald is a 74 y o  male who presents with   Chief Complaint   Patient presents with    Blood in Urine     Pt reports bright red blood in urine starting this evening around 0100  Denies pain or trauma          History of Present Illness   HPI:  Radha Fitzgerald is a 74 y o  male who presents for evaluation of:  Acute gross hematuria several episodes throughout the night tonight  Patient notes some associated suprapubic discomfort  Patient has had prostate enlargement on prior CT  Patient has a h/o liver transplant  He is not on any AC but does take clopidogrel  He is currently having severe suprapubic discomfort that started hours ago at 0100 and has persisted  Review of Systems   Constitutional: Negative for fatigue and fever  HENT: Negative for congestion and sore throat  Respiratory: Negative for cough and shortness of breath  Cardiovascular: Negative for chest pain and palpitations  Gastrointestinal: Negative for abdominal pain and nausea  Genitourinary: Positive for hematuria  Negative for flank pain and frequency     Neurological: Negative for light-headedness and headaches  Psychiatric/Behavioral: Negative for dysphoric mood and hallucinations  All other systems reviewed and are negative  Historical Information   Past Medical History:   Diagnosis Date    Alcoholism ([facility] )     Cerebral artery aneurysm     Change in mental state     last assessed 5/18/15; resolved 10/27/15    Diabetes [facility]     Drug dependence ([facility] )     Fatigue     last assessed 1/26/15; resolved 5/24/16    Hepatitis [address] discharge follow-up 12/21/2018    Kidney disease     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[facility] )     [facility])     Renal disorder     Subdural hygroma     2/27/14; resolved 7/28/15    Thrombocytopenia ([facility] ) 9/20/2017     Past Surgical History:   Procedure Laterality Date    BRAIN SURGERY  02/12/2014    left frontotemporal 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    CATARACT EXTRACTION      CHOLECYSTECTOMY      FL LUMBAR PUNCTURE DIAGNOSTIC  12/14/2018    IR PICC LINE  12/14/2018    LIVER TRANSPLANTATION      ROTATOR CUFF REPAIR      SHOULDER SURGERY       Social History   Social History     Substance and Sexual Activity   Alcohol Use No     Social History     Substance and Sexual Activity   Drug Use No    Comment: remotely quit drug use per [employer]      Social History     Tobacco Use   Smoking Status Never Smoker   Smokeless Tobacco Never Used   Tobacco Comment    former smoker per [employer]      Family History:   Family History   Problem Relation Age of Onset    Hypertension Mother         benign essential     Lung cancer Father     Diabetes Sister     Cancer Brother     Stroke Other         cva - due to embolism of cerebra artery        Meds/Allergies   PTA meds:   Prior to Admission Medications   Prescriptions Last Dose Informant Patient Reported? Taking?    Comfort EZ Pen Needles 32G X 4 MM MISC   No No   Sig: USE 3-4 AS DIRECTED   Diclofenac Sodium (VOLTAREN) 1 %   No No   Sig: Apply 2 g topically in the morning and 2 g at noon and 2 g in the evening and 2 g before bedtime     INSULIN SYRINGE 1CC/29G 29G X 1/2" 1 ML MISC  Self Yes No   Sig: by Does not apply route   Lancet Devices (Lancing Device) MISC   No No   Sig: USE AS DIRECTED   Lancets MISC  Self Yes No   Sig: by Does not apply route   OneTouch Ultra test strip   No No   Sig: TEST UP TO 3 TIMES A DAY   acetaminophen (TYLENOL) 500 mg tablet   No No   Sig: Take 1 tablet (500 mg total) by mouth every 6 (six) hours as needed for mild pain   clopidogrel (PLAVIX) 75 mg tablet   No No   Sig: Take 1 tablet (75 mg total) by mouth daily   insulin detemir (Levemir FlexTouch) 100 Units/mL injection pen   No No   Sig: Inject 22 Units under the skin daily at bedtime   pregabalin (LYRICA) 50 mg capsule   No No   Sig: TAKE 1 CAPSULE (50 MG TOTAL) BY MOUTH 3 (THREE) TIMES A DAY   rosuvastatin (CRESTOR) 40 MG tablet   No No   Sig: TAKE ONE (1) TABLET BY MOUTH DAILY   tacrolimus (PROGRAF) 1 mg capsule   No No   Sig: Take 1 capsule (1 mg total) by mouth every 12 (twelve) hours   tamsulosin (FLOMAX) 0 4 mg  Self No No   Sig: TAKE 1 CAPSULE(0 4 MG) BY MOUTH DAILY WITH DINNER   temazepam (RESTORIL) 30 mg capsule   No No   Sig: TAKE 1 CAPSULE (30 MG TOTAL) BY MOUTH DAILY AT BEDTIME   traMADol (ULTRAM) 50 mg tablet   No No   Sig: TAKE 1 TABLET (50 MG TOTAL) BY MOUTH EVERY 8 (EIGHT) HOURS AS NEEDED FOR MODERATE PAIN   triamcinolone (KENALOG) 0 1 % cream   No No   Sig: APPLY TO AFFECTED AREA TWICE DAILY   zolpidem (AMBIEN) 10 mg tablet   No No   Sig: Take 1 tablet (10 mg total) by mouth daily at bedtime      Facility-Administered Medications: None     Allergies   Allergen Reactions    Tequin [Gatifloxacin] Rash    Ciprofloxacin     Iv Contrast [Iodinated Diagnostic Agents]     Levofloxacin Rash    Lipitor [Atorvastatin] Rash     Rash and itching    Omnipaque [Iohexol]        Objective   First Vitals:   Blood Pressure: 147/64 (05/23/22 0449)  Pulse: 73 (22)  Temperature: 98 2 °F (36 8 °C) (22 044)  Temp Source: Oral (22)  Respirations: 18 (22)  SpO2: 100 % (22)    Current Vitals:   Blood Pressure: 147/64 (22 044)  Pulse: 73 (22)  Temperature: 98 2 °F (36 8 °C) (22)  Temp Source: Oral (22)  Respirations: 18 (22)  SpO2: 100 % (22)    No intake or output data in the 24 hours ending 22    Invasive Devices  Report    None                 Physical Exam  Vitals and nursing note reviewed  Constitutional:       General: He is not in acute distress  Appearance: Normal appearance  He is well-developed  HENT:      Head: Normocephalic and atraumatic  Right Ear: External ear normal       Left Ear: External ear normal       Nose: Nose normal       Mouth/Throat:      Pharynx: No oropharyngeal exudate  Eyes:      Conjunctiva/sclera: Conjunctivae normal       Pupils: Pupils are equal, round, and reactive to light  Cardiovascular:      Rate and Rhythm: Normal rate and regular rhythm  Pulmonary:      Effort: Pulmonary effort is normal  No respiratory distress  Abdominal:      General: Abdomen is flat  There is no distension  Palpations: Abdomen is soft  Musculoskeletal:         General: No deformity  Normal range of motion  Cervical back: Normal range of motion and neck supple  Skin:     General: Skin is warm and dry  Capillary Refill: Capillary refill takes less than 2 seconds  Neurological:      General: No focal deficit present  Mental Status: He is alert and oriented to person, place, and time  Mental status is at baseline  Coordination: Coordination normal    Psychiatric:         Mood and Affect: Mood normal          Behavior: Behavior normal          Thought Content: Thought content normal          Judgment: Judgment normal            Medical Decision Makin   Acute gross hematuria; CBI with Tracey irrigation    No results found for this or any previous visit (from the past 36 hour(s))  No orders to display         Portions of the record may have been created with voice recognition software  Occasional wrong word or "sound a like" substitutions may have occurred due to the inherent limitations of voice recognition software  Read the chart carefully and recognize, using context, where substitutions have occurred          Critical Care Time  Procedures

## 2022-05-24 ENCOUNTER — APPOINTMENT (INPATIENT)
Dept: RADIOLOGY | Facility: HOSPITAL | Age: 72
DRG: 669 | End: 2022-05-24
Payer: COMMERCIAL

## 2022-05-24 ENCOUNTER — ANESTHESIA (INPATIENT)
Dept: PERIOP | Facility: HOSPITAL | Age: 72
DRG: 669 | End: 2022-05-24
Payer: COMMERCIAL

## 2022-05-24 ENCOUNTER — ANESTHESIA EVENT (INPATIENT)
Dept: PERIOP | Facility: HOSPITAL | Age: 72
DRG: 669 | End: 2022-05-24
Payer: COMMERCIAL

## 2022-05-24 LAB
BACTERIA UR CULT: NORMAL
GLUCOSE SERPL-MCNC: 142 MG/DL (ref 65–140)
GLUCOSE SERPL-MCNC: 142 MG/DL (ref 65–140)
GLUCOSE SERPL-MCNC: 186 MG/DL (ref 65–140)
GLUCOSE SERPL-MCNC: 209 MG/DL (ref 65–140)
TACROLIMUS BLD-MCNC: 3.3 NG/ML (ref 2–20)

## 2022-05-24 PROCEDURE — 82948 REAGENT STRIP/BLOOD GLUCOSE: CPT

## 2022-05-24 PROCEDURE — 99232 SBSQ HOSP IP/OBS MODERATE 35: CPT | Performed by: UROLOGY

## 2022-05-24 PROCEDURE — 36569 INSJ PICC 5 YR+ W/O IMAGING: CPT

## 2022-05-24 PROCEDURE — 99232 SBSQ HOSP IP/OBS MODERATE 35: CPT | Performed by: PHYSICIAN ASSISTANT

## 2022-05-24 PROCEDURE — C1751 CATH, INF, PER/CENT/MIDLINE: HCPCS

## 2022-05-24 RX ORDER — HYDROMORPHONE HCL/PF 1 MG/ML
0.5 SYRINGE (ML) INJECTION EVERY 4 HOURS PRN
Status: DISCONTINUED | OUTPATIENT
Start: 2022-05-24 | End: 2022-05-30 | Stop reason: HOSPADM

## 2022-05-24 RX ORDER — OXYCODONE HYDROCHLORIDE 5 MG/1
5 TABLET ORAL EVERY 4 HOURS PRN
Status: DISCONTINUED | OUTPATIENT
Start: 2022-05-24 | End: 2022-05-25

## 2022-05-24 RX ORDER — OXYCODONE HYDROCHLORIDE 10 MG/1
10 TABLET ORAL EVERY 4 HOURS PRN
Status: DISCONTINUED | OUTPATIENT
Start: 2022-05-24 | End: 2022-05-25

## 2022-05-24 RX ADMIN — TEMAZEPAM 30 MG: 15 CAPSULE ORAL at 21:15

## 2022-05-24 RX ADMIN — TACROLIMUS 1 MG: 1 CAPSULE ORAL at 21:15

## 2022-05-24 RX ADMIN — INSULIN LISPRO 1 UNITS: 100 INJECTION, SOLUTION INTRAVENOUS; SUBCUTANEOUS at 21:26

## 2022-05-24 RX ADMIN — HYDROMORPHONE HYDROCHLORIDE 0.5 MG: 1 INJECTION, SOLUTION INTRAMUSCULAR; INTRAVENOUS; SUBCUTANEOUS at 17:34

## 2022-05-24 RX ADMIN — PREGABALIN 50 MG: 50 CAPSULE ORAL at 15:58

## 2022-05-24 RX ADMIN — TAMSULOSIN HYDROCHLORIDE 0.4 MG: 0.4 CAPSULE ORAL at 15:58

## 2022-05-24 RX ADMIN — ACETAMINOPHEN 650 MG: 325 TABLET, FILM COATED ORAL at 08:35

## 2022-05-24 RX ADMIN — ZOLPIDEM TARTRATE 10 MG: 5 TABLET ORAL at 21:15

## 2022-05-24 RX ADMIN — INSULIN LISPRO 1 UNITS: 100 INJECTION, SOLUTION INTRAVENOUS; SUBCUTANEOUS at 16:39

## 2022-05-24 RX ADMIN — OXYBUTYNIN 5 MG: 5 TABLET, FILM COATED, EXTENDED RELEASE ORAL at 08:35

## 2022-05-24 RX ADMIN — PREGABALIN 50 MG: 50 CAPSULE ORAL at 21:15

## 2022-05-24 RX ADMIN — TACROLIMUS 1 MG: 1 CAPSULE ORAL at 08:34

## 2022-05-24 RX ADMIN — OXYCODONE HYDROCHLORIDE 5 MG: 5 TABLET ORAL at 15:58

## 2022-05-24 RX ADMIN — PREGABALIN 50 MG: 50 CAPSULE ORAL at 08:35

## 2022-05-24 RX ADMIN — HYDROMORPHONE HYDROCHLORIDE 0.5 MG: 1 INJECTION, SOLUTION INTRAMUSCULAR; INTRAVENOUS; SUBCUTANEOUS at 23:26

## 2022-05-24 RX ADMIN — HYDROMORPHONE HYDROCHLORIDE 1 MG: 1 INJECTION, SOLUTION INTRAMUSCULAR; INTRAVENOUS; SUBCUTANEOUS at 08:27

## 2022-05-24 RX ADMIN — ACETAMINOPHEN 650 MG: 325 TABLET, FILM COATED ORAL at 23:25

## 2022-05-24 RX ADMIN — INSULIN DETEMIR 22 UNITS: 100 INJECTION, SOLUTION SUBCUTANEOUS at 21:26

## 2022-05-24 NOTE — PROGRESS NOTES
Progress Note -Urology   Marlena [place] 74 y o  male MRN: [id number]  Unit/Bed#: OR POOL Encounter: [id number]    Assessment & Plan:    Gross hematuria:  -patient presenting with gross hematuria with possible distal ureteral and right ureteral junction abnormality  -CT scan reveals persistent moderate right-sided hydroureteronephrosis without discrete ureteral calculus  There was however a large bladder mass now seen which may reflect hematoma/neoplasm  There is a possible soft tissue density extending into the right ureteral vesicular junction  This can be further evaluated with cystoscopy  -3 way urethral Webb catheter inserted yesterday and CBI initiated, re-evaluation this a m  Revealed clear yellow urine   -patient initially NPO at midnight for cystoscopy clot evacuation, fulguration, possible TURBT, ureteroscopy with possible biopsies and ureteral stent insertion bilaterally, possible  -however case was canceled and postponed into Thursday due to inability to retrieve IV access  -continue to wean off of CBI  -serial H&H, hemoglobin 10 4 today    Urology will continue to follow      Subjective/Objective   Chief Complaint:  None    Subjective:  Patient currently lying comfortably in bed no acute distress  Does report that he has some pain primarily in his right lower quadrant  Along with right flank pain  Denies any additional discomfort  Denies any discomfort with urethral Webb catheter at this time  Objective:     Blood pressure 129/76, pulse 96, temperature (!) 97 4 °F (36 3 °C), resp  rate 18, SpO2 100 %  ,There is no height or weight on file to calculate BMI        Intake/Output Summary (Last 24 hours) at 5/24/2022 1306  Last data filed at 5/24/2022 0538  Gross per 24 hour   Intake --   Output 90130 ml   Net -31193 ml       Invasive Devices  Report    Peripheral Intravenous Line  Duration           Peripheral IV 05/23/22 Distal;Right;Upper;Ventral (anterior) Arm 1 day          Drain  Duration Urethral Catheter Three way 24 Fr  1 day              Physical Exam  Constitutional:       General: He is not in acute distress  Appearance: He is normal weight  He is not ill-appearing, toxic-appearing or diaphoretic  HENT:      Head: Normocephalic and atraumatic  Right Ear: External ear normal       Left Ear: External ear normal       Nose: Nose normal       Mouth/Throat:      Pharynx: Oropharynx is clear  Eyes:      General: No scleral icterus  Conjunctiva/sclera: Conjunctivae normal    Cardiovascular:      Rate and Rhythm: Normal rate and regular rhythm  Pulses: Normal pulses  Heart sounds: No murmur heard  No friction rub  No gallop  Pulmonary:      Effort: Pulmonary effort is normal  No respiratory distress  Breath sounds: No wheezing, rhonchi or rales  Abdominal:      General: Bowel sounds are normal  There is no distension  Tenderness: There is abdominal tenderness  There is right CVA tenderness  Comments: Right lower quadrant tenderness   Genitourinary:     Comments: Urine currently clear yellow attached to CBI running prison open, decreased to 1/4 of the way open  Musculoskeletal:         General: Normal range of motion  Cervical back: Normal range of motion  Skin:     General: Skin is warm and dry  Neurological:      General: No focal deficit present  Mental Status: He is alert and oriented to person, place, and time  Psychiatric:         Mood and Affect: Mood normal          Behavior: Behavior normal          Thought Content: Thought content normal          Judgment: Judgment normal          Lab, Imaging and other studies:I have personally reviewed pertinent lab results     Lab Results   Component Value Date    WBC 7 59 05/23/2022    HGB 10 4 (L) 05/23/2022    HCT 33 2 (L) 05/23/2022    MCV 78 (L) 05/23/2022     05/23/2022     Lab Results   Component Value Date    SODIUM 134 (L) 05/23/2022    K 4 5 05/23/2022     05/23/2022    CO2 29 05/23/2022    BUN 42 (H) 05/23/2022    CREATININE 2 94 (H) 05/23/2022    GLUC 178 (H) 05/23/2022    CALCIUM 9 0 05/23/2022        VTE Pharmacologic Prophylaxis: Reason for no pharmacologic prophylaxis Gross hematuria  VTE Mechanical Prophylaxis: sequential compression device      Popeye Kay PA-C

## 2022-05-24 NOTE — PROGRESS NOTES
[facility]  Progress Note - Kylah Place 1950, 74 y o  male MRN: [id number]  Unit/Bed#: -01 Encounter: [id number]  Primary Care Provider: Rozina Parra DO   Date and time admitted to hospital: 5/23/2022  4:44 AM    * Gross hematuria  Assessment & Plan  Gross hematuria as noted by patient with clots developing earlier this evening, and persistent  · UA with trace leukocytes and urine culture negative  · CT AP with right sided hydro and bladder mass concern for hematoma and/or neoplasm, see plan below   · Status post Webb catheter insertion and initiation of CBI on 5/23   · Appreciate ongoing urology recommendations   · Trend serial hemoglobins  · Hold AP/AC    Hydronephrosis of right kidney  Assessment & Plan  CT AP: Persistent moderate right-sided hydroureteronephrosis without a discrete ureteral calculus  Jose J Felty is however a large bladder mass now seen which may reflect hematoma and/or neoplasm  Jose J Felty is possible soft tissue density extending into the right   ureterovesical junction     · Webb catheter unable to be inserted in ED as above  · Appreciate urology inputs   · 3-way Webb catheter placed on 5/23   · Plan for cystoscopy, clot evacuation and possible transurethral resection of bladder tumor with ureteroscopy of the right distal ureter, potential ureteral biopsies and stent placement today       Stage 4 chronic kidney disease [facility])  Assessment & Plan  Lab Results   Component Value Date    EGFR 20 05/23/2022    EGFR 19 04/05/2022    EGFR 19 02/10/2022    CREATININE 2 94 (H) 05/23/2022    CREATININE 2 98 (H) 04/05/2022    CREATININE 3 06 (H) 02/10/2022   · Follows with [facility] nephrology  · Creatinine currently at baseline of approximately 2 9  · Was referred back to [facility] transplant for potential renal transplant in future  · Avoid nephrotoxins and hypotension   · Monitor BMP - today's labs pending     Liver transplant status [facility])  Assessment & Plan  · History of liver transplant in 2003, maintained on Tacrolimus managed by [facility] transplant  · Continue home dose of Prograf 1 mg q 12 hours  · Pending tacrolimus level; may need to reach out to [facility] to assist with dosing  · Appreciate GI inputs     History of CVA (cerebrovascular accident)  Assessment & Plan  · History of prior CVA, currently without new focal deficits  · Plavix on hold given gross hematuria, resume when ok with Urology     Type 2 diabetes mellitus with diabetic peripheral angiopathy without gangrene, with long-term current use of insulin [facility])  Assessment & Plan  Lab Results   Component Value Date    HGBA1C 7 2 (H) 02/10/2022       Recent Labs     05/23/22  1134 05/23/22  1621 05/23/22  2111 05/24/22  0635   POCGLU 174* 208* 225* 142*       Blood Sugar Average: Last 72 hrs:  · Continue Levemir 22 units q h s , add correctional insulin with Accu-Cheks while inpatient  · Carb controlled diet  · Initiate hypoglycemia protocol  · Continue home dose Lyrica for neuropathy        VTE Pharmacologic Prophylaxis: VTE Score: 4 Moderate Risk (Score 3-4) - Pharmacological DVT Prophylaxis Contraindicated  Sequential Compression Devices Ordered  Patient Centered Rounds: I performed bedside rounds with nursing staff today  Discussions with Specialists or Other Care Team Provider: primary RN, case management     Education and Discussions with Family / Patient: Updated  (daughter) at bedside  Time Spent for Care: 15 minutes  More than 50% of total time spent on counseling and coordination of care as described above  Current Length of Stay: 1 day(s)  Current Patient Status: Inpatient   Certification Statement: The patient will continue to require additional inpatient hospital stay due to pending urologic intervention  Discharge Plan: Anticipate discharge in 48-72 hrs to home      Code Status: Level 2 - DNAR: but accepts endotracheal intubation    Subjective:   Patient offers no specific complaints today  He reports feeling better and less pain compared to yesterday  He tells me they are planning the procedure for 12 today  Objective:     Vitals:   Temp (24hrs), Av 6 °F (36 4 °C), Min:97 3 °F (36 3 °C), Max:97 8 °F (36 6 °C)    Temp:  [97 3 °F (36 3 °C)-97 8 °F (36 6 °C)] 97 4 °F (36 3 °C)  HR:  [96] 96  BP: ()/(44-91) 129/76  There is no height or weight on file to calculate BMI  Input and Output Summary (last 24 hours): Intake/Output Summary (Last 24 hours) at 2022 1052  Last data filed at 2022 0538  Gross per 24 hour   Intake --   Output 46088 ml   Net -64528 ml       Physical Exam:   Physical Exam  Vitals and nursing note reviewed  Constitutional:       General: He is not in acute distress  Cardiovascular:      Rate and Rhythm: Normal rate and regular rhythm  Pulmonary:      Effort: Pulmonary effort is normal  No respiratory distress  Abdominal:      General: Abdomen is flat  There is no distension  Palpations: Abdomen is soft  Tenderness: There is no abdominal tenderness  Genitourinary:     Comments: Webb catheter draining cloudy yellow urine  Neurological:      General: No focal deficit present  Mental Status: He is alert and oriented to person, place, and time  Mental status is at baseline           Additional Data:     Labs:  Results from last 7 days   Lab Units 22  1804 22  0543   WBC Thousand/uL  --  7 59   HEMOGLOBIN g/dL 10 4* 9 7*   HEMATOCRIT % 33 2* 31 7*   PLATELETS Thousands/uL  --  162   NEUTROS PCT %  --  49   LYMPHS PCT %  --  34   MONOS PCT %  --  9   EOS PCT %  --  8*     Results from last 7 days   Lab Units 22  0638 22  0543   SODIUM mmol/L  --  134*   POTASSIUM mmol/L 4 5 6 0*   CHLORIDE mmol/L  --  105   CO2 mmol/L  --  29   BUN mg/dL  --  42*   CREATININE mg/dL  --  2 94*   ANION GAP mmol/L  --  0*   CALCIUM mg/dL  --  9 0   ALBUMIN g/dL  --  3 1*   TOTAL BILIRUBIN mg/dL  --  0 40   ALK PHOS U/L  --  85   ALT U/L  --  25   AST U/L  --  57*   GLUCOSE RANDOM mg/dL  --  178*     Results from last 7 days   Lab Units 05/23/22  0543   INR  1 10     Results from last 7 days   Lab Units 05/24/22  1046 05/24/22  0635 05/23/22  2111 05/23/22  1621 05/23/22  1134   POC GLUCOSE mg/dl 142* 142* 225* 208* 174*               Lines/Drains:  Invasive Devices  Report    Peripheral Intravenous Line  Duration           Peripheral IV 05/23/22 Distal;Right;Upper;Ventral (anterior) Arm <1 day          Drain  Duration           Urethral Catheter Three way 24 Fr  1 day              Urinary Catheter:  Goal for removal: Plan to remove POD#1               Imaging: No pertinent imaging reviewed      Recent Cultures (last 7 days):   Results from last 7 days   Lab Units 05/23/22  0612   URINE CULTURE  No Growth <1000 cfu/mL       Last 24 Hours Medication List:   Current Facility-Administered Medications   Medication Dose Route Frequency Provider Last Rate    acetaminophen  650 mg Oral Q6H PRN Alexsandra Browning DO      belladonna-opium  30 mg Rectal Q8H PRN Violeta Matthews MD      HYDROmorphone  0 5 mg Intravenous Q4H PRN Noemy Padron PA-C      HYDROmorphone  1 mg Intravenous Q4H PRN Noemy Padron PA-C      insulin detemir  22 Units Subcutaneous HS Alexsandra Browning, DO      insulin lispro  1-5 Units Subcutaneous TID AC Alexsandra Browning, DO      insulin lispro  1-5 Units Subcutaneous HS Alexsandra Browning DO      ondansetron  4 mg Intravenous Q6H PRN Alexsandra Browning DO      oxybutynin  5 mg Oral Daily Violeta Matthews MD      pregabalin  50 mg Oral TID Alexsandra Browning, DO      tacrolimus  1 mg Oral Q12H Alexsandra Browning, DO      tamsulosin  0 4 mg Oral Daily With [employer] Jason DO      temazepam  30 mg Oral HS Alexsandra Browning, DO      zolpidem  10 mg Oral HS Alexsandra Browning DO          Today, Patient Was Seen By: Mounika [place] PA-C    **Please Note: This note may have been constructed using a voice recognition system  **

## 2022-05-24 NOTE — ASSESSMENT & PLAN NOTE
Lab Results   Component Value Date    HGBA1C 7 2 (H) 02/10/2022       Recent Labs     05/23/22  1134 05/23/22  1621 05/23/22  2111 05/24/22  0635   POCGLU 174* 208* 225* 142*       Blood Sugar Average: Last 72 hrs:  · Continue Levemir 22 units q h s , add correctional insulin with Accu-Cheks while inpatient  · Carb controlled diet  · Initiate hypoglycemia protocol  · Continue home dose Lyrica for neuropathy

## 2022-05-24 NOTE — PLAN OF CARE
Problem: MOBILITY - ADULT  Goal: Maintain or return to baseline ADL function  Description: INTERVENTIONS:  -  Assess patient's ability to carry out ADLs; assess patient's baseline for ADL function and identify physical deficits which impact ability to perform ADLs (bathing, care of mouth/teeth, toileting, grooming, dressing, etc )  - Assess/evaluate cause of self-care deficits   - Assess range of motion  - Assess patient's mobility; develop plan if impaired  - Assess patient's need for assistive devices and provide as appropriate  - Encourage maximum independence but intervene and supervise when necessary  - Involve family in performance of ADLs  - Assess for home care needs following discharge   - Consider OT consult to assist with ADL evaluation and planning for discharge  - Provide patient education as appropriate  Outcome: Progressing  Goal: Maintains/Returns to pre admission functional level  Description: INTERVENTIONS:  - Perform BMAT or MOVE assessment daily    - Set and communicate daily mobility goal to care team and patient/family/caregiver  - Collaborate with rehabilitation services on mobility goals if consulted  - Perform Range of Motion  times a day  - Reposition patient every      hours    - Dangle patient        times a day  - Stand patient    times a day  - Ambulate patient      times a day  - Out of bed to chair    times a day   - Out of bed for meals      times a day  - Out of bed for toileting  - Record patient progress and toleration of activity level   Outcome: Progressing     Problem: CARDIOVASCULAR - ADULT  Goal: Maintains optimal cardiac output and hemodynamic stability  Description: INTERVENTIONS:  - Monitor I/O, vital signs and rhythm  - Monitor for S/S and trends of decreased cardiac output  - Administer and titrate ordered vasoactive medications to optimize hemodynamic stability  - Assess quality of pulses, skin color and temperature  - Assess for signs of decreased coronary artery perfusion  - Instruct patient to report change in severity of symptoms  Outcome: Progressing  Goal: Absence of cardiac dysrhythmias or at baseline rhythm  Description: INTERVENTIONS:  - Continuous cardiac monitoring, vital signs, obtain 12 lead EKG if ordered  - Administer antiarrhythmic and heart rate control medications as ordered  - Monitor electrolytes and administer replacement therapy as ordered  Outcome: Progressing     Problem: RESPIRATORY - ADULT  Goal: Achieves optimal ventilation and oxygenation  Description: INTERVENTIONS:  - Assess for changes in respiratory status  - Assess for changes in mentation and behavior  - Position to facilitate oxygenation and minimize respiratory effort  - Oxygen administered by appropriate delivery if ordered  - Initiate smoking cessation education as indicated  - Encourage broncho-pulmonary hygiene including cough, deep breathe, Incentive Spirometry  - Assess the need for suctioning and aspirate as needed  - Assess and instruct to report SOB or any respiratory difficulty  - Respiratory Therapy support as indicated  Outcome: Progressing     Problem: GENITOURINARY - ADULT  Goal: Maintains or returns to baseline urinary function  Description: INTERVENTIONS:  - Assess urinary function  - Encourage oral fluids to ensure adequate hydration if ordered  - Administer IV fluids as ordered to ensure adequate hydration  - Administer ordered medications as needed  - Offer frequent toileting  - Follow urinary retention protocol if ordered  Outcome: Progressing  Goal: Absence of urinary retention  Description: INTERVENTIONS:  - Assess patients ability to void and empty bladder  - Monitor I/O  - Bladder scan as needed  - Discuss with physician/AP medications to alleviate retention as needed  - Discuss catheterization for long term situations as appropriate  Outcome: Progressing  Goal: Urinary catheter remains patent  Description: INTERVENTIONS:  - Assess patency of urinary catheter  - If patient has a chronic flores, consider changing catheter if non-functioning  - Follow guidelines for intermittent irrigation of non-functioning urinary catheter  Outcome: Progressing     Problem: MUSCULOSKELETAL - ADULT  Goal: Maintain or return mobility to safest level of function  Description: INTERVENTIONS:  - Assess patient's ability to carry out ADLs; assess patient's baseline for ADL function and identify physical deficits which impact ability to perform ADLs (bathing, care of mouth/teeth, toileting, grooming, dressing, etc )  - Assess/evaluate cause of self-care deficits   - Assess range of motion  - Assess patient's mobility  - Assess patient's need for assistive devices and provide as appropriate  - Encourage maximum independence but intervene and supervise when necessary  - Involve family in performance of ADLs  - Assess for home care needs following discharge   - Consider OT consult to assist with ADL evaluation and planning for discharge  - Provide patient education as appropriate  Outcome: Progressing  Goal: Maintain proper alignment of affected body part  Description: INTERVENTIONS:  - Support, maintain and protect limb and body alignment  - Provide patient/ family with appropriate education  Outcome: Progressing     Problem: Potential for Falls  Goal: Patient will remain free of falls  Description: INTERVENTIONS:  - Educate patient/family on patient safety including physical limitations  - Instruct patient to call for assistance with activity   - Consult OT/PT to assist with strengthening/mobility   - Keep Call bell within reach  - Keep bed low and locked with side rails adjusted as appropriate  - Keep care items and personal belongings within reach  - Initiate and maintain comfort rounds  - Make Fall Risk Sign visible to staff  - Offer Toileting every      Hours, in advance of need  - Initiate/Maintain     alarm  - Obtain necessary fall risk management equipment:     - Apply yellow socks and bracelet for high fall risk patients  - Consider moving patient to room near nurses station  Outcome: Progressing

## 2022-05-24 NOTE — UTILIZATION REVIEW
Initial Clinical Review    Admission: Date/Time/Statement:   Admission Orders (From admission, onward)     Ordered        05/23/22 0632  Inpatient Admission  Once                      Orders Placed This Encounter   Procedures    Inpatient Admission     Standing Status:   Standing     Number of Occurrences:   1     Order Specific Question:   Level of Care     Answer:   Med Surg [16]     Order Specific Question:   Estimated length of stay     Answer:   More than 2 Midnights     Order Specific Question:   Certification     Answer:   I certify that inpatient services are medically necessary for this patient for a duration of greater than two midnights  See H&P and MD Progress Notes for additional information about the patient's course of treatment  ED Arrival Information     Expected   -    Arrival   5/23/2022 04:38    Acuity   Less Urgent            Means of arrival   Walk-In    Escorted by   Family Member    Service   Hospitalist    Admission type   Urgent            Arrival complaint   Blood in Urine           Chief Complaint   Patient presents with    Blood in Urine     Pt reports bright red blood in urine starting this evening around 0100  Denies pain or trauma        Initial Presentation: 74 y o  male  presented to the ED from home for evaluation of blood in urine developing earlier this evening  PMH of CVA maintained on Plavix, liver transplant maintained on tacrolimus, DM2 on insulin therapy, and CKD Stage IV  Patient states he was in his normal state of health, he woke to urinate and noted difficulty urinating with eventual production of bright red blood with clot into toilet  He attempted to sleep following this, however awoke a few hours later to urinate and noted recurrence of urine with bright red blood and now with suprapubic pain  During ED evaluation 3 way catheter for bladder irrigation was attempted, however unable to be placed       5/23 Plan: Inpatient admission for evaluation and treatment Spoke with mother, states patient was seen in 1/3/18 for ear infection, states a few days after staring antibiotics her diaper area looked red and patient was itching. She used desitin, which seemed to help initially, but now she seems to be more itchy and has had redness in her diaper area.  No vaginal discharge, edema.  Denies exposure to any new products.  Mother asking patient could have possible yeast infection.  Discussed with mother, will call in [facility], USE OF EACH DIAPER CHANGE UNTIL RASH RESOLVED.  MOTHER AGREEABLE.WS    of gross hematuria:  Hold AP/AC, trend hemoglobin, consult Urology  Continue home dose of Prograf 1 mg Q12H pending tacrolimus level  5/23 Urology consult:  Seen earlier today by Urology advanced practitioner team, 3 way Webb catheter was placed and continuous bladder irrigation was started after hand irrigation allow for clearing of clots  Patient continues to have significant suprapubic pain  Patient's urine is now draining clear on moderate drip CBI  Patient is having significant bladder spasms  Will institute Ditropan and belladonna and opium suppositories  Concerned that there is an abnormality in the right ureterovesical junction potentially in the right distal ureter as evidenced by the films today as compared to 1 year prior  Plan to move forward with cystoscopy, clot evacuation and possible transurethral resection of bladder tumor with ureteroscopy of the right distal ureter, potential ureteral biopsies and stent placement  5/24 Urology: Patient was brought to the prep and hold area for surgery  Unfortunately, he did not have working intravenous access at the time  Multiple attempts by nursing and anesthesia providers were unsuccessful to obtain intravenous access including use of the bedside ultrasound  The patient's urine is clear and he is more comfortable today  Will continue continuous bladder irrigation for the time being  Will discuss with the Medicine Service and arrange for PICC line access  Patient's operative intervention will be rescheduled for later this week           ED Triage Vitals   Temperature Pulse Respirations Blood Pressure SpO2   05/23/22 0449 05/23/22 0449 05/23/22 0449 05/23/22 0449 05/23/22 0449   98 2 °F (36 8 °C) 73 18 147/64 100 %      Temp Source Heart Rate Source Patient Position - Orthostatic VS BP Location FiO2 (%)   05/23/22 0449 05/23/22 0449 05/23/22 0449 05/23/22 0449 --   Oral Monitor Lying Right arm       Pain Score       05/23/22 0727       7 Wt Readings from Last 1 Encounters:   04/27/22 59 4 kg (131 lb)     Additional Vital Signs:     Date/Time Temp Pulse Resp BP MAP (mmHg) SpO2 O2 Device   05/24/22 15:56:44 -- -- -- 107/66 80 -- --   05/24/22 0827 -- 96 -- -- -- -- --   05/24/22 08:13:23 97 4 °F (36 3 °C) Abnormal  -- -- 129/76 94 -- --   05/24/22 00:06:13 97 8 °F (36 6 °C) -- -- 92/60 71 -- --   05/24/22 00:04:54 97 8 °F (36 6 °C) -- -- 79/44 Abnormal  56 -- --   05/23/22 15:26:23 97 3 °F (36 3 °C) Abnormal  -- -- 169/91 117 -- --   05/23/22 07:47:27 98 °F (36 7 °C) -- -- 137/73 94 -- --         Pertinent Labs/Diagnostic Test Results: Insert PICC line     Date/Time: 5/24/2022 3:07 PM    PICC line indications: no peripheral vascular access   Procedural supplies:  Double lumen  Catheter size:  5 Fr  Assessment:  Blood return through all ports and free fluid flow  Patient tolerance of procedure: Tolerated well, no immediate complications      CT abdomen pelvis wo contrast   Final Result by Severo Perez MD (05/23 0940)      Persistent moderate right-sided hydroureteronephrosis without a discrete ureteral calculus  There is however a large bladder mass now seen which may reflect hematoma and/or neoplasm  There is possible soft tissue density extending into the right    ureterovesical junction  This can be further evaluated with cystoscopy              Results from last 7 days   Lab Units 05/23/22  1804 05/23/22  0543   WBC Thousand/uL  --  7 59   HEMOGLOBIN g/dL 10 4* 9 7*   HEMATOCRIT % 33 2* 31 7*   PLATELETS Thousands/uL  --  162   NEUTROS ABS Thousands/µL  --  3 68         Results from last 7 days   Lab Units 05/23/22  0638 05/23/22  0543   SODIUM mmol/L  --  134*   POTASSIUM mmol/L 4 5 6 0*   CHLORIDE mmol/L  --  105   CO2 mmol/L  --  29   ANION GAP mmol/L  --  0*   BUN mg/dL  --  42*   CREATININE mg/dL  --  2 94*   EGFR ml/min/1 73sq m  --  20   CALCIUM mg/dL  --  9 0     Results from last 7 days   Lab Units 05/23/22  0543   AST U/L 57* ALT U/L 25   ALK PHOS U/L 85   TOTAL PROTEIN g/dL 8 0   ALBUMIN g/dL 3 1*   TOTAL BILIRUBIN mg/dL 0 40     Results from last 7 days   Lab Units 05/24/22  1046 05/24/22  0635 05/23/22  2111 05/23/22  1621 05/23/22  1134   POC GLUCOSE mg/dl 142* 142* 225* 208* 174*     Results from last 7 days   Lab Units 05/23/22  0543   GLUCOSE RANDOM mg/dL 178*             Results from last 7 days   Lab Units 05/23/22  0543   PROTIME seconds 13 7   INR  1 10             Results from last 7 days   Lab Units 05/23/22  0612   CLARITY UA  Extra Turbid   COLOR UA  Dark Brown   SPEC GRAV UA  1 012   PH UA  7 0   GLUCOSE UA mg/dl Trace*   KETONES UA mg/dl Negative   BLOOD UA  Large*   PROTEIN UA mg/dl 600 (3+)*   NITRITE UA  Negative   BILIRUBIN UA  Negative   UROBILINOGEN UA (BE) mg/dl <2 0   LEUKOCYTES UA  Trace*   WBC UA /hpf 20-30*   RBC UA /hpf Innumerable*   BACTERIA UA /hpf None Seen   EPITHELIAL CELLS WET PREP /hpf None Seen       Results from last 7 days   Lab Units 05/23/22  0612   URINE CULTURE  No Growth <1000 cfu/mL               ED Treatment:   Medication Administration from 05/23/2022 0438 to 05/23/2022 0725     None        Past Medical History:   Diagnosis Date    Alcoholism [facility])     Cerebral artery aneurysm     Change in mental state     last assessed 5/18/15; resolved 10/27/15    Diabetes mellitus ([facility] )     Drug dependence ([place] [address] )     Fatigue     last assessed 1/26/15; resolved 5/24/16    [facility] discharge follow-up 12/21/2018    Kidney disease     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     Liver transplant recipient [facility])     Renal disorder     Subdural hygroma     2/27/14; resolved 7/28/15    Thrombocytopenia ([facility] ) 9/20/2017     Present on Admission:   Gross hematuria   Stage 4 chronic kidney disease ([facility] )      Admitting Diagnosis: Blood in urine [R31 9]  Hematuria [R31 9]  Gross hematuria [R31 0]  Age/Sex: 74 y o  male       Admission Orders:    Scheduled Medications:  insulin detemir, 22 Units, Subcutaneous, HS   insulin lispro, 1-5 Units, Subcutaneous, TID AC   insulin lispro, 1-5 Units, Subcutaneous, HS   oxybutynin, 5 mg, Oral, Daily  pregabalin, 50 mg, Oral, TID  tacrolimus, 1 mg, Oral, Q12H  tamsulosin, 0 4 mg, Oral, Daily With Dinner   temazepam, 30 mg, Oral, HS  zolpidem, 10 mg, Oral, HS      Continuous IV Infusions: None  PRN Meds:  acetaminophen, 650 mg, Oral, Q6H PRN   belladonna-opium, 30 mg, Rectal, Q8H PRN x 1 dose 5/23   HYDROmorphone, 0 5 mg, Intravenous, Q4H PRN   HYDROmorphone, 1 mg, Intravenous, Q4H PRN x 2 doses 5/23, x 1 dose 5/24  ondansetron, 4 mg, Intravenous, Q6H PRN    oxyCODONE (ROXICODONE) IR tablet 5 mg x 1 dose 5/24  Dose: 5 mg  Freq: Every 4 hours PRN Route: PO  PRN Reason: moderate pain  Start: 05/24/22 1407      IP CONSULT TO UROLOGY  IP CONSULT TO GASTROENTEROLOGY    Network Utilization Review Department  ATTENTION: Please call with any questions or concerns to [phone] and carefully listen to the prompts so that you are directed to the right person  All voicemails are confidential   Shyam Patricio all requests for admission clinical reviews, approved or denied determinations and any other requests to dedicated fax number below belonging to the campus where the patient is receiving treatment   List of dedicated fax numbers for the Facilities:  [address] [place] [address] DENIALS (Administrative/Medical Necessity) [phone]   [address] (Maternity/NICU/Pediatrics) 261 [facility] 40 125 [facility]  [address] 179[facility]  [address] [address] [place] [address] [address] [facility] [phone]

## 2022-05-24 NOTE — ANESTHESIA PREPROCEDURE EVALUATION
Procedure:  TRANSURETHRAL RESECTION OF BLADDER TUMOR (TURBT) (N/A Bladder)  URETEROSCOPY, POSSIBLE URETERAL BIOPSY/LASER FULGURATION (Right Bladder)  CYSTOSCOPY RETROGRADE PYELOGRAM WITH INSERTION STENT URETERAL (Right Bladder)    Relevant Problems   No relevant active problems        Physical Exam    Airway    Mallampati score: II  TM Distance: <3 FB  Neck ROM: limited     Dental   upper dentures and lower dentures,     Cardiovascular  Cardiovascular exam normal    Pulmonary  Pulmonary exam normal     Other Findings        Anesthesia Plan  ASA Score- 4     Anesthesia Type- general with ASA Monitors  Additional Monitors:   Airway Plan: LMA  Plan Factors-Exercise tolerance (METS): >4 METS  Chart reviewed  EKG reviewed  Imaging results reviewed  Existing labs reviewed  Patient summary reviewed  Patient is not a current smoker  Induction- intravenous  Postoperative Plan- Plan for postoperative opioid use  Planned trial extubation    Informed Consent- Anesthetic plan and risks discussed with patient  I personally reviewed this patient with the CRNA  Discussed and agreed on the Anesthesia Plan with the CRNA  Jasmyne Hernandez

## 2022-05-24 NOTE — ASSESSMENT & PLAN NOTE
Lab Results   Component Value Date    EGFR 20 05/23/2022    EGFR 19 04/05/2022    EGFR 19 02/10/2022    CREATININE 2 94 (H) 05/23/2022    CREATININE 2 98 (H) 04/05/2022    CREATININE 3 06 (H) 02/10/2022   · Follows with [facility] nephrology  · Creatinine currently at baseline of approximately 2 9  · Was referred back to [facility] transplant for potential renal transplant in future  · Avoid nephrotoxins and hypotension   · Monitor BMP - today's labs pending

## 2022-05-24 NOTE — QUICK NOTE
Patient was brought to the prep and hold area for surgery  Unfortunately, he did not have working intravenous access at the time  Multiple attempts by nursing and anesthesia providers were unsuccessful to obtain intravenous access including use of the bedside ultrasound  The patient's urine is clear and he is more comfortable today  Will continue continuous bladder irrigation for the time being  Will discuss with the Medicine Service and arrange for PICC line access tomorrow  Patient's operative intervention will be rescheduled for later this week

## 2022-05-24 NOTE — PROCEDURES
Insert PICC line    Date/Time: 5/24/2022 3:07 PM  Performed by: Nancy Archer RN  Authorized by: Selma Zurita PA-C     Patient location:  Bedside  Other Assisting Provider: Yes (comment) (Elodia Posey)    Consent:     Consent obtained:  Written (obtained by MD)    Consent given by:  Patient    Procedural risks discussed: reviewed by MD   Universal protocol:     Procedure explained and questions answered to patient or proxy's satisfaction: yes      Relevant documents present and verified: yes      Test results available and properly labeled: yes      Radiology Images displayed and confirmed  If images not available, report reviewed: yes      Required blood products, implants, devices, and special equipment available: yes      Site/side marked: yes      Immediately prior to procedure, a time out was called: yes      Patient identity confirmed:  Verbally with patient and arm band  Pre-procedure details:     Hand hygiene: Hand hygiene performed prior to insertion      Sterile barrier technique: All elements of maximal sterile technique followed      Skin preparation:  ChloraPrep    Skin preparation agent: Skin preparation agent completely dried prior to procedure    Indications:     PICC line indications: no peripheral vascular access    Anesthesia (see MAR for exact dosages):      Anesthesia method:  Local infiltration    Local anesthetic:  Lidocaine 1% w/o epi (2mL)  Procedure details:     Location:  Brachial    Vessel type: vein      Laterality:  Right    Approach: percutaneous technique used      Patient position:  Flat    Procedural supplies:  Double lumen    Catheter size:  5 Fr    Landmarks identified: yes      Ultrasound guidance: yes      Sterile ultrasound techniques: Sterile gel and sterile probe covers were used      Number of attempts:  1    Successful placement: yes      Vessel of catheter tip end:  Sherlock 3CG confirmed    Total catheter length (cm):  33    Catheter out on skin (cm):  1    Max flow rate:  999    Arm circumference:  25  Post-procedure details:     Post-procedure:  Dressing applied and securement device placed    Assessment:  Blood return through all ports and free fluid flow    Post-procedure complications: none      Patient tolerance of procedure:   Tolerated well, no immediate complications

## 2022-05-24 NOTE — ASSESSMENT & PLAN NOTE
CT AP: Persistent moderate right-sided hydroureteronephrosis without a discrete ureteral calculus  Arthor Guitar is however a large bladder mass now seen which may reflect hematoma and/or neoplasm  Arthor Guitar is possible soft tissue density extending into the right   ureterovesical junction     · Webb catheter unable to be inserted in ED as above  · Appreciate urology inputs   · 3-way Webb catheter placed on 5/23   · Plan for cystoscopy, clot evacuation and possible transurethral resection of bladder tumor with ureteroscopy of the right distal ureter, potential ureteral biopsies and stent placement today

## 2022-05-24 NOTE — ASSESSMENT & PLAN NOTE
Gross hematuria as noted by patient with clots developing earlier this evening, and persistent  · UA with trace leukocytes and urine culture negative  · CT AP with right sided hydro and bladder mass concern for hematoma and/or neoplasm, see plan below   · Status post Webb catheter insertion and initiation of CBI on 5/23   · Appreciate ongoing urology recommendations   · Trend serial hemoglobins  · Hold AP/AC

## 2022-05-24 NOTE — QUICK NOTE
Patient is much more comfortable after Webb catheter placement and bladder irrigation overnight  Again, we discussed the plan for surgery today with the patient and his significant other  Will proceed to the operating room for cystoscopy and clot evacuation, possible transurethral resection of any bladder  Would also potentially perform retrograde pyelogram, right ureteroscopy and biopsy of any ureteral tumor  Risks and benefits were previously discussed and again the patient and his significant other agreed to proceed  Informed consent signed  Right side is marked  Appreciate the input from the gastroenterology team given the patient's transplant history  Will be treated with perioperative antibiotics      /76   Pulse 96   Temp (!) 97 4 °F (36 3 °C)   Resp 18   SpO2 100%

## 2022-05-24 NOTE — ASSESSMENT & PLAN NOTE
· History of liver transplant in 2003, maintained on Tacrolimus managed by [facility] transplant  · Continue home dose of Prograf 1 mg q 12 hours  · Pending tacrolimus level; may need to reach out to [facility] to assist with dosing  · Appreciate GI inputs

## 2022-05-24 NOTE — PLAN OF CARE
Problem: MOBILITY - ADULT  Goal: Maintain or return to baseline ADL function  Description: INTERVENTIONS:  -  Assess patient's ability to carry out ADLs; assess patient's baseline for ADL function and identify physical deficits which impact ability to perform ADLs (bathing, care of mouth/teeth, toileting, grooming, dressing, etc )  - Assess/evaluate cause of self-care deficits   - Assess range of motion  - Assess patient's mobility; develop plan if impaired  - Assess patient's need for assistive devices and provide as appropriate  - Encourage maximum independence but intervene and supervise when necessary  - Involve family in performance of ADLs  - Assess for home care needs following discharge   - Consider OT consult to assist with ADL evaluation and planning for discharge  - Provide patient education as appropriate  Outcome: Progressing  Goal: Maintains/Returns to pre admission functional level  Description: INTERVENTIONS:  - Perform BMAT or MOVE assessment daily    - Set and communicate daily mobility goal to care team and patient/family/caregiver     - Collaborate with rehabilitation services on mobility goals if consulted  - Perform Range of Mo  - Out of bed for toileting  - Record patient progress and toleration of activity level   Outcome: Progressing     Problem: CARDIOVASCULAR - ADULT  Goal: Maintains optimal cardiac output and hemodynamic stability  Description: INTERVENTIONS:  - Monitor I/O, vital signs and rhythm  - Monitor for S/S and trends of decreased cardiac output  - Administer and titrate ordered vasoactive medications to optimize hemodynamic stability  - Assess quality of pulses, skin color and temperature  - Assess for signs of decreased coronary artery perfusion  - Instruct patient to report change in severity of symptoms  Outcome: Progressing  Goal: Absence of cardiac dysrhythmias or at baseline rhythm  Description: INTERVENTIONS:  - Continuous cardiac monitoring, vital signs, obtain 12 lead EKG if ordered  - Administer antiarrhythmic and heart rate control medications as ordered  - Monitor electrolytes and administer replacement therapy as ordered  Outcome: Progressing     Problem: RESPIRATORY - ADULT  Goal: Achieves optimal ventilation and oxygenation  Description: INTERVENTIONS:  - Assess for changes in respiratory status  - Assess for changes in mentation and behavior  - Position to facilitate oxygenation and minimize respiratory effort  - Oxygen administered by appropriate delivery if ordered  - Initiate smoking cessation education as indicated  - Encourage broncho-pulmonary hygiene including cough, deep breathe, Incentive Spirometry  - Assess the need for suctioning and aspirate as needed  - Assess and instruct to report SOB or any respiratory difficulty  - Respiratory Therapy support as indicated  Outcome: Progressing     Problem: GENITOURINARY - ADULT  Goal: Maintains or returns to baseline urinary function  Description: INTERVENTIONS:  - Assess urinary function  - Encourage oral fluids to ensure adequate hydration if ordered  - Administer IV fluids as ordered to ensure adequate hydration  - Administer ordered medications as needed  - Offer frequent toileting  - Follow urinary retention protocol if ordered  Outcome: Progressing  Goal: Absence of urinary retention  Description: INTERVENTIONS:  - Assess patients ability to void and empty bladder  - Monitor I/O  - Bladder scan as needed  - Discuss with physician/AP medications to alleviate retention as needed  - Discuss catheterization for long term situations as appropriate  Outcome: Progressing  Goal: Urinary catheter remains patent  Description: INTERVENTIONS:  - Assess patency of urinary catheter  - If patient has a chronic flores, consider changing catheter if non-functioning  - Follow guidelines for intermittent irrigation of non-functioning urinary catheter  Outcome: Progressing     Problem: MUSCULOSKELETAL - ADULT  Goal: Maintain or return mobility to safest level of function  Description: INTERVENTIONS:  - Assess patient's ability to carry out ADLs; assess patient's baseline for ADL function and identify physical deficits which impact ability to perform ADLs (bathing, care of mouth/teeth, toileting, grooming, dressing, etc )  - Assess/evaluate cause of self-care deficits   - Assess range of motion  - Assess patient's mobility  - Assess patient's need for assistive devices and provide as appropriate  - Encourage maximum independence but intervene and supervise when necessary  - Involve family in performance of ADLs  - Assess for home care needs following discharge   - Consider OT consult to assist with ADL evaluation and planning for discharge  - Provide patient education as appropriate  Outcome: Progressing  Goal: Maintain proper alignment of affected body part  Description: INTERVENTIONS:  - Support, maintain and protect limb and body alignment  - Provide patient/ family with appropriate education  Outcome: Progressing     Problem: Potential for Falls  Goal: Patient will remain free of falls  Description: INTERVENTIONS:  - Educate patient/family on patient safety including physical limitations  - Instruct patient to call for assistance with activity   - Consult OT/PT to assist with strengthening/mobility   - Keep Call bell within reach  - Keep bed low and locked with side rails adjusted as appropriate  - Keep care items and personal belongings within reach  - Initiate and maintain comfort rounds  - Make Fall Risk Sign visible to staff  - Offer Toileting everyHours, in advance of need  - Initiate/Maintain alarm  - Obtain necessary fall risk management equipment:   - Apply yellow socks and bracelet for high fall risk patients  - Consider moving patient to room near nurses station  Outcome: Progressing

## 2022-05-25 ENCOUNTER — ANESTHESIA EVENT (INPATIENT)
Dept: PERIOP | Facility: HOSPITAL | Age: 72
DRG: 669 | End: 2022-05-25
Payer: COMMERCIAL

## 2022-05-25 PROBLEM — Z78.9 PROBLEM WITH VASCULAR ACCESS: Status: ACTIVE | Noted: 2022-05-25

## 2022-05-25 LAB
ANION GAP SERPL CALCULATED.3IONS-SCNC: 3 MMOL/L (ref 4–13)
BUN SERPL-MCNC: 40 MG/DL (ref 5–25)
CALCIUM SERPL-MCNC: 9.1 MG/DL (ref 8.3–10.1)
CHLORIDE SERPL-SCNC: 107 MMOL/L (ref 100–108)
CO2 SERPL-SCNC: 27 MMOL/L (ref 21–32)
CREAT SERPL-MCNC: 3.08 MG/DL (ref 0.6–1.3)
ERYTHROCYTE [DISTWIDTH] IN BLOOD BY AUTOMATED COUNT: 15.4 % (ref 11.6–15.1)
GFR SERPL CREATININE-BSD FRML MDRD: 18 ML/MIN/1.73SQ M
GLUCOSE SERPL-MCNC: 172 MG/DL (ref 65–140)
GLUCOSE SERPL-MCNC: 175 MG/DL (ref 65–140)
GLUCOSE SERPL-MCNC: 194 MG/DL (ref 65–140)
GLUCOSE SERPL-MCNC: 236 MG/DL (ref 65–140)
GLUCOSE SERPL-MCNC: 70 MG/DL (ref 65–140)
HCT VFR BLD AUTO: 28.5 % (ref 36.5–49.3)
HGB BLD-MCNC: 9 G/DL (ref 12–17)
MCH RBC QN AUTO: 24.1 PG (ref 26.8–34.3)
MCHC RBC AUTO-ENTMCNC: 31.6 G/DL (ref 31.4–37.4)
MCV RBC AUTO: 76 FL (ref 82–98)
PLATELET # BLD AUTO: 104 THOUSANDS/UL (ref 149–390)
PMV BLD AUTO: 12.4 FL (ref 8.9–12.7)
POTASSIUM SERPL-SCNC: 4.7 MMOL/L (ref 3.5–5.3)
RBC # BLD AUTO: 3.73 MILLION/UL (ref 3.88–5.62)
SODIUM SERPL-SCNC: 137 MMOL/L (ref 136–145)
WBC # BLD AUTO: 14.54 THOUSAND/UL (ref 4.31–10.16)

## 2022-05-25 PROCEDURE — 80048 BASIC METABOLIC PNL TOTAL CA: CPT | Performed by: PHYSICIAN ASSISTANT

## 2022-05-25 PROCEDURE — 85027 COMPLETE CBC AUTOMATED: CPT | Performed by: PHYSICIAN ASSISTANT

## 2022-05-25 PROCEDURE — 82948 REAGENT STRIP/BLOOD GLUCOSE: CPT

## 2022-05-25 PROCEDURE — 99232 SBSQ HOSP IP/OBS MODERATE 35: CPT | Performed by: PHYSICIAN ASSISTANT

## 2022-05-25 RX ORDER — HYDROMORPHONE HYDROCHLORIDE 2 MG/1
2 TABLET ORAL EVERY 4 HOURS PRN
Status: DISCONTINUED | OUTPATIENT
Start: 2022-05-25 | End: 2022-05-30 | Stop reason: HOSPADM

## 2022-05-25 RX ORDER — HYDROMORPHONE HYDROCHLORIDE 4 MG/1
4 TABLET ORAL EVERY 4 HOURS PRN
Status: DISCONTINUED | OUTPATIENT
Start: 2022-05-25 | End: 2022-05-30 | Stop reason: HOSPADM

## 2022-05-25 RX ORDER — OXYBUTYNIN CHLORIDE 5 MG/1
5 TABLET ORAL 3 TIMES DAILY PRN
Status: DISCONTINUED | OUTPATIENT
Start: 2022-05-25 | End: 2022-05-30 | Stop reason: HOSPADM

## 2022-05-25 RX ORDER — SODIUM CHLORIDE, SODIUM GLUCONATE, SODIUM ACETATE, POTASSIUM CHLORIDE, MAGNESIUM CHLORIDE, SODIUM PHOSPHATE, DIBASIC, AND POTASSIUM PHOSPHATE .53; .5; .37; .037; .03; .012; .00082 G/100ML; G/100ML; G/100ML; G/100ML; G/100ML; G/100ML; G/100ML
75 INJECTION, SOLUTION INTRAVENOUS CONTINUOUS
Status: DISCONTINUED | OUTPATIENT
Start: 2022-05-25 | End: 2022-05-29

## 2022-05-25 RX ADMIN — INSULIN DETEMIR 22 UNITS: 100 INJECTION, SOLUTION SUBCUTANEOUS at 22:25

## 2022-05-25 RX ADMIN — PREGABALIN 50 MG: 50 CAPSULE ORAL at 17:30

## 2022-05-25 RX ADMIN — TAMSULOSIN HYDROCHLORIDE 0.4 MG: 0.4 CAPSULE ORAL at 17:30

## 2022-05-25 RX ADMIN — ATROPA BELLADONNA AND OPIUM 1 SUPPOSITORY: 16.2; 3 SUPPOSITORY RECTAL at 21:28

## 2022-05-25 RX ADMIN — TACROLIMUS 1 MG: 1 CAPSULE ORAL at 10:48

## 2022-05-25 RX ADMIN — ACETAMINOPHEN 650 MG: 325 TABLET, FILM COATED ORAL at 06:00

## 2022-05-25 RX ADMIN — HYDROMORPHONE HYDROCHLORIDE 0.5 MG: 1 INJECTION, SOLUTION INTRAMUSCULAR; INTRAVENOUS; SUBCUTANEOUS at 20:56

## 2022-05-25 RX ADMIN — PREGABALIN 50 MG: 50 CAPSULE ORAL at 22:15

## 2022-05-25 RX ADMIN — TEMAZEPAM 30 MG: 15 CAPSULE ORAL at 22:14

## 2022-05-25 RX ADMIN — INSULIN LISPRO 1 UNITS: 100 INJECTION, SOLUTION INTRAVENOUS; SUBCUTANEOUS at 22:25

## 2022-05-25 RX ADMIN — OXYCODONE HYDROCHLORIDE 10 MG: 10 TABLET ORAL at 06:01

## 2022-05-25 RX ADMIN — TACROLIMUS 1 MG: 1 CAPSULE ORAL at 22:15

## 2022-05-25 RX ADMIN — PREGABALIN 50 MG: 50 CAPSULE ORAL at 10:47

## 2022-05-25 RX ADMIN — INSULIN LISPRO 2 UNITS: 100 INJECTION, SOLUTION INTRAVENOUS; SUBCUTANEOUS at 17:28

## 2022-05-25 RX ADMIN — HYDROMORPHONE HYDROCHLORIDE 4 MG: 4 TABLET ORAL at 19:39

## 2022-05-25 RX ADMIN — INSULIN LISPRO 1 UNITS: 100 INJECTION, SOLUTION INTRAVENOUS; SUBCUTANEOUS at 11:24

## 2022-05-25 RX ADMIN — HYDROMORPHONE HYDROCHLORIDE 4 MG: 4 TABLET ORAL at 10:53

## 2022-05-25 RX ADMIN — HYDROMORPHONE HYDROCHLORIDE 0.5 MG: 1 INJECTION, SOLUTION INTRAMUSCULAR; INTRAVENOUS; SUBCUTANEOUS at 13:53

## 2022-05-25 RX ADMIN — ZOLPIDEM TARTRATE 10 MG: 5 TABLET ORAL at 22:14

## 2022-05-25 RX ADMIN — OXYBUTYNIN 5 MG: 5 TABLET, FILM COATED, EXTENDED RELEASE ORAL at 10:47

## 2022-05-25 RX ADMIN — ACETAMINOPHEN 650 MG: 325 TABLET, FILM COATED ORAL at 22:15

## 2022-05-25 RX ADMIN — SODIUM CHLORIDE, SODIUM GLUCONATE, SODIUM ACETATE, POTASSIUM CHLORIDE, MAGNESIUM CHLORIDE, SODIUM PHOSPHATE, DIBASIC, AND POTASSIUM PHOSPHATE 75 ML/HR: .53; .5; .37; .037; .03; .012; .00082 INJECTION, SOLUTION INTRAVENOUS at 13:36

## 2022-05-25 RX ADMIN — HYDROMORPHONE HYDROCHLORIDE 0.5 MG: 1 INJECTION, SOLUTION INTRAMUSCULAR; INTRAVENOUS; SUBCUTANEOUS at 07:25

## 2022-05-25 NOTE — PROGRESS NOTES
[facility]  Progress Note - Josue Clifford 1950, 74 y o  male MRN: [id number]  Unit/Bed#: -01 Encounter: [id number]  Primary Care Provider: Eun Dean DO   Date and time admitted to hospital: 5/23/2022  4:44 AM    * Gross hematuria  Assessment & Plan  Gross hematuria as noted by patient with clots developing earlier this evening, and persistent  · UA with trace leukocytes and urine culture negative  · CT AP with right sided hydro and bladder mass concern for hematoma and/or neoplasm, see plan below   · Status post Webb catheter insertion and initiation of CBI on 5/23   · Appreciate ongoing urology recommendations   · Trend serial hemoglobins - lab's never collected yesterday or this AM, discussed with RN  · Improved/resolved currently   · Hold AP/AC    Hydronephrosis of right kidney  Assessment & Plan  CT AP: Persistent moderate right-sided hydroureteronephrosis without a discrete ureteral calculus  Buzzy Mad is however a large bladder mass now seen which may reflect hematoma and/or neoplasm  Buzzy Mad is possible soft tissue density extending into the right   ureterovesical junction     · Urine culture with no growth  · Appreciate urology inputs   · 3-way Webb catheter placed on 5/23   · Continue with oxybutynin and Flomax  · Plan for cystoscopy, clot evacuation and possible transurethral resection of bladder tumor with ureteroscopy of the right distal ureter, potential ureteral biopsies and stent placement tomorrow     · Analgesics as needed, patient reports poor control regimen; will rotate from PO oxy to PO dilaudid    Stage 4 chronic kidney disease [facility])  Assessment & Plan  Lab Results   Component Value Date    EGFR 20 05/23/2022    EGFR 19 04/05/2022    EGFR 19 02/10/2022    CREATININE 2 94 (H) 05/23/2022    CREATININE 2 98 (H) 04/05/2022    CREATININE 3 06 (H) 02/10/2022   · Follows with [facility] nephrology  · Creatinine currently at baseline of approximately 2 9  · Was referred back to [facility] transplant for potential renal transplant in future  · Avoid nephrotoxins and hypotension   · Monitor BMP - today's labs pending     Liver transplant status [facility])  Assessment & Plan  · History of liver transplant in 2003, maintained on Tacrolimus managed by [facility] transplant  · Continue home dose of Prograf 1 mg q 12 hours  · Tacrolimus level WDL  · Appreciate GI inputs     History of CVA (cerebrovascular accident)  Assessment & Plan  · History of prior CVA, currently without new focal deficits  · Plavix on hold given gross hematuria, resume when ok with Urology     Type 2 diabetes mellitus with diabetic peripheral angiopathy without gangrene, with long-term current use of insulin [facility])  Assessment & Plan  Lab Results   Component Value Date    HGBA1C 7 2 (H) 02/10/2022       Recent Labs     05/24/22  1046 05/24/22  1637 05/24/22  2121 05/25/22  0624   POCGLU 142* 186* 209* 70       Blood Sugar Average: Last 72 hrs:  · Continue Levemir 22 units q h s  · Correctional insulin with Accu-Cheks while inpatient  · Carb controlled diet  · Initiate hypoglycemia protocol  · Continue home dose Lyrica for neuropathy    Problem with vascular access  Assessment & Plan  · Status post R brachial PICC placement on 5/24      VTE Pharmacologic Prophylaxis: VTE Score: 4 Moderate Risk (Score 3-4) - Pharmacological DVT Prophylaxis Contraindicated  Sequential Compression Devices Ordered  Patient Centered Rounds: I performed bedside rounds with nursing staff today  Discussions with Specialists or Other Care Team Provider: primary RN, case management     Education and Discussions with Family / Patient: Updated  (wife) at bedside  Time Spent for Care: 15 minutes  More than 50% of total time spent on counseling and coordination of care as described above      Current Length of Stay: 2 day(s)  Current Patient Status: Inpatient   Certification Statement: The patient will continue to require additional inpatient hospital stay due to pending urologic intervention   Discharge Plan: Anticipate discharge in 48-72 hrs to home  Code Status: Level 2 - DNAR: but accepts endotracheal intubation    Subjective:   Patient offers no new complaints today however reports persistent and poorly controlled abdominal/groin pain  He tells me he has relief with IV Dilaudid but no relief with p o  Oxycodone  Discussed transitioning oxy to p o  Dilaudid and keeping IV as needed for breakthrough pain only, patient expressed understanding  He is aware they have rescheduled this procedure for tomorrow  Objective:     Vitals:   Temp (24hrs), Av 8 °F (37 1 °C), Min:97 5 °F (36 4 °C), Max:100 °F (37 8 °C)    Temp:  [97 5 °F (36 4 °C)-100 °F (37 8 °C)] 97 5 °F (36 4 °C)  BP: (107-132)/(66-72) 130/71  There is no height or weight on file to calculate BMI  Input and Output Summary (last 24 hours): Intake/Output Summary (Last 24 hours) at 2022 1001  Last data filed at 2022 8572  Gross per 24 hour   Intake --   Output 74327 ml   Net -50201 ml       Physical Exam:   Physical Exam  Vitals and nursing note reviewed  Constitutional:       General: He is not in acute distress  Cardiovascular:      Rate and Rhythm: Normal rate and regular rhythm  Pulses: Normal pulses  Heart sounds: No murmur heard  Pulmonary:      Effort: Pulmonary effort is normal  No respiratory distress  Genitourinary:     Comments: Webb catheter draining clear yellow urine  Neurological:      General: No focal deficit present  Mental Status: He is alert and oriented to person, place, and time  Mental status is at baseline          Additional Data:     Labs:  Results from last 7 days   Lab Units 22  1804 22  0543   WBC Thousand/uL  --  7 59   HEMOGLOBIN g/dL 10 4* 9 7*   HEMATOCRIT % 33 2* 31 7*   PLATELETS Thousands/uL  --  162   NEUTROS PCT %  --  49   LYMPHS PCT %  --  34   MONOS PCT %  --  9   EOS PCT % --  8*     Results from last 7 days   Lab Units 05/23/22  0638 05/23/22  0543   SODIUM mmol/L  --  134*   POTASSIUM mmol/L 4 5 6 0*   CHLORIDE mmol/L  --  105   CO2 mmol/L  --  29   BUN mg/dL  --  42*   CREATININE mg/dL  --  2 94*   ANION GAP mmol/L  --  0*   CALCIUM mg/dL  --  9 0   ALBUMIN g/dL  --  3 1*   TOTAL BILIRUBIN mg/dL  --  0 40   ALK PHOS U/L  --  85   ALT U/L  --  25   AST U/L  --  57*   GLUCOSE RANDOM mg/dL  --  178*     Results from last 7 days   Lab Units 05/23/22  0543   INR  1 10     Results from last 7 days   Lab Units 05/25/22  0624 05/24/22  2121 05/24/22  1637 05/24/22  1046 05/24/22  0635 05/23/22  2111 05/23/22  1621 05/23/22  1134   POC GLUCOSE mg/dl 70 209* 186* 142* 142* 225* 208* 174*               Lines/Drains:  Invasive Devices  Report    Peripherally Inserted Central Catheter Line  Duration           PICC Line 05/24/22 Right Brachial <1 day          Peripheral Intravenous Line  Duration           Peripheral IV 05/23/22 Distal;Right;Upper;Ventral (anterior) Arm 1 day          Drain  Duration           Urethral Catheter Three way 24 Fr  2 days              Urinary Catheter:  Goal for removal: Plan to remove POD#1         Central Line:  Goal for removal: Will discontinue when meds requiring line are completed  Will discontinue when peripheral access obtained  Imaging: No pertinent imaging reviewed      Recent Cultures (last 7 days):   Results from last 7 days   Lab Units 05/23/22  0612   URINE CULTURE  No Growth <1000 cfu/mL       Last 24 Hours Medication List:   Current Facility-Administered Medications   Medication Dose Route Frequency Provider Last Rate    acetaminophen  650 mg Oral Q6H PRN Noemy E Held, PA-C      belladonna-opium  30 mg Rectal Q8H PRN Noemy E Held, PA-C      HYDROmorphone  0 5 mg Intravenous Q4H PRN Noemy E Held, PA-C      HYDROmorphone  2 mg Oral Q4H PRN Noemy E Held, PA-C      HYDROmorphone  4 mg Oral Q4H PRN Noemy E Held, PA-C      insulin detemir 22 Units Subcutaneous HS Noemy E Held, PA-C      insulin lispro  1-5 Units Subcutaneous TID AC Noemy E Held, PA-C      insulin lispro  1-5 Units Subcutaneous HS Noemy E Held, PA-C      ondansetron  4 mg Intravenous Q6H PRN Noemy E Held, PA-C      oxybutynin  5 mg Oral Daily Noemy E Held, PA-YOEL      pregabalin  50 mg Oral TID Noemy E Held, PA-C      tacrolimus  1 mg Oral Q12H Noemy E Held, PA-YOEL      tamsulosin  0 4 mg Oral Daily With [employer] Held, PA-YOEL      temazepam  30 mg Oral HS Noemy E Held, PA-YOEL      zolpidem  10 mg Oral HS Noemy E Held, PIETRO          Today, Patient Was Seen By: Efrain Rodrigues PA-C    **Please Note: This note may have been constructed using a voice recognition system  **

## 2022-05-25 NOTE — ASSESSMENT & PLAN NOTE
Lab Results   Component Value Date    EGFR 20 05/23/2022    EGFR 19 04/05/2022    EGFR 19 02/10/2022    CREATININE 2 94 (H) 05/23/2022    CREATININE 2 98 (H) 04/05/2022    CREATININE 3 06 (H) 02/10/2022   · Follows with [facility] nephrology  · Creatinine currently at baseline of approximately 2 9  · Was referred back to [facility] transplant for potential renal transplant in future  · Avoid nephrotoxins and hypotension   · Monitor BMP - today's labs pending

## 2022-05-25 NOTE — PLAN OF CARE
Problem: MOBILITY - ADULT  Goal: Maintain or return to baseline ADL function  Description: INTERVENTIONS:  -  Assess patient's ability to carry out ADLs; assess patient's baseline for ADL function and identify physical deficits which impact ability to perform ADLs (bathing, care of mouth/teeth, toileting, grooming, dressing, etc )  - Assess/evaluate cause of self-care deficits   - Assess range of motion  - Assess patient's mobility; develop plan if impaired  - Assess patient's need for assistive devices and provide as appropriate  - Encourage maximum independence but intervene and supervise when necessary  - Involve family in performance of ADLs  - Assess for home care needs following discharge   - Consider OT consult to assist with ADL evaluation and planning for discharge  - Provide patient education as appropriate  Outcome: Progressing  Goal: Maintains/Returns to pre admission functional level  Description: INTERVENTIONS:  - Perform BMAT or MOVE assessment daily    - Set and communicate daily mobility goal to care team and patient/family/caregiver  - Collaborate with rehabilitation services on mobility goals if consulted  - Perform Range of Motion  times a day  - Reposition patient every      hours    - Dangle patient    times a day  - Stand patient    times a day  - Ambulate patient      times a day  - Out of bed to chair    times a day   - Out of bed for meals      times a day  - Out of bed for toileting  - Record patient progress and toleration of activity level   Outcome: Progressing     Problem: CARDIOVASCULAR - ADULT  Goal: Maintains optimal cardiac output and hemodynamic stability  Description: INTERVENTIONS:  - Monitor I/O, vital signs and rhythm  - Monitor for S/S and trends of decreased cardiac output  - Administer and titrate ordered vasoactive medications to optimize hemodynamic stability  - Assess quality of pulses, skin color and temperature  - Assess for signs of decreased coronary artery perfusion  - Instruct patient to report change in severity of symptoms  Outcome: Progressing  Goal: Absence of cardiac dysrhythmias or at baseline rhythm  Description: INTERVENTIONS:  - Continuous cardiac monitoring, vital signs, obtain 12 lead EKG if ordered  - Administer antiarrhythmic and heart rate control medications as ordered  - Monitor electrolytes and administer replacement therapy as ordered  Outcome: Progressing     Problem: RESPIRATORY - ADULT  Goal: Achieves optimal ventilation and oxygenation  Description: INTERVENTIONS:  - Assess for changes in respiratory status  - Assess for changes in mentation and behavior  - Position to facilitate oxygenation and minimize respiratory effort  - Oxygen administered by appropriate delivery if ordered  - Initiate smoking cessation education as indicated  - Encourage broncho-pulmonary hygiene including cough, deep breathe, Incentive Spirometry  - Assess the need for suctioning and aspirate as needed  - Assess and instruct to report SOB or any respiratory difficulty  - Respiratory Therapy support as indicated  Outcome: Progressing     Problem: GENITOURINARY - ADULT  Goal: Maintains or returns to baseline urinary function  Description: INTERVENTIONS:  - Assess urinary function  - Encourage oral fluids to ensure adequate hydration if ordered  - Administer IV fluids as ordered to ensure adequate hydration  - Administer ordered medications as needed  - Offer frequent toileting  - Follow urinary retention protocol if ordered  Outcome: Progressing  Goal: Absence of urinary retention  Description: INTERVENTIONS:  - Assess patients ability to void and empty bladder  - Monitor I/O  - Bladder scan as needed  - Discuss with physician/AP medications to alleviate retention as needed  - Discuss catheterization for long term situations as appropriate  Outcome: Progressing  Goal: Urinary catheter remains patent  Description: INTERVENTIONS:  - Assess patency of urinary catheter  - If patient has a chronic flores, consider changing catheter if non-functioning  - Follow guidelines for intermittent irrigation of non-functioning urinary catheter  Outcome: Progressing     Problem: MUSCULOSKELETAL - ADULT  Goal: Maintain or return mobility to safest level of function  Description: INTERVENTIONS:  - Assess patient's ability to carry out ADLs; assess patient's baseline for ADL function and identify physical deficits which impact ability to perform ADLs (bathing, care of mouth/teeth, toileting, grooming, dressing, etc )  - Assess/evaluate cause of self-care deficits   - Assess range of motion  - Assess patient's mobility  - Assess patient's need for assistive devices and provide as appropriate  - Encourage maximum independence but intervene and supervise when necessary  - Involve family in performance of ADLs  - Assess for home care needs following discharge   - Consider OT consult to assist with ADL evaluation and planning for discharge  - Provide patient education as appropriate  Outcome: Progressing  Goal: Maintain proper alignment of affected body part  Description: INTERVENTIONS:  - Support, maintain and protect limb and body alignment  - Provide patient/ family with appropriate education  Outcome: Progressing     Problem: Potential for Falls  Goal: Patient will remain free of falls  Description: INTERVENTIONS:  - Educate patient/family on patient safety including physical limitations  - Instruct patient to call for assistance with activity   - Consult OT/PT to assist with strengthening/mobility   - Keep Call bell within reach  - Keep bed low and locked with side rails adjusted as appropriate  - Keep care items and personal belongings within reach  - Initiate and maintain comfort rounds  - Make Fall Risk Sign visible to staff  - Offer Toileting every    Hours, in advance of need  - Initiate/Maintain     alarm  - Obtain necessary fall risk management equipment:     - Apply yellow socks and bracelet for high fall risk patients  - Consider moving patient to room near nurses station  Outcome: Progressing     Problem: Knowledge Deficit  Goal: Patient/family/caregiver demonstrates understanding of disease process, treatment plan, medications, and discharge instructions  Description: Complete learning assessment and assess knowledge base    Interventions:  - Provide teaching at level of understanding  - Provide teaching via preferred learning methods  Outcome: Progressing

## 2022-05-25 NOTE — QUICK NOTE
Patient's surgery yesterday was canceled due to inability to obtain IV access  Patient is comfortable at the bedside and his urine is clear on low drip continuous bladder irrigation  There is no hematuria but rather clear yellow urine  I still think it is prudent for us to proceed to the operating room tomorrow for inspection of the patient's bladder and assessment for underlying bladder tumor in the right posterior bladder trigone area or in the right distal ureter  Consent has already been obtained  Right upper arm PICC line has now been placed  Patient will be kept NPO after midnight

## 2022-05-25 NOTE — ASSESSMENT & PLAN NOTE
· History of liver transplant in 2003, maintained on Tacrolimus managed by [facility] transplant  · Continue home dose of Prograf 1 mg q 12 hours  · Tacrolimus level WDL  · Appreciate GI inputs

## 2022-05-25 NOTE — ASSESSMENT & PLAN NOTE
Gross hematuria as noted by patient with clots developing earlier this evening, and persistent  · UA with trace leukocytes and urine culture negative  · CT AP with right sided hydro and bladder mass concern for hematoma and/or neoplasm, see plan below   · Status post Webb catheter insertion and initiation of CBI on 5/23   · Appreciate ongoing urology recommendations   · Trend serial hemoglobins - lab's never collected yesterday or this AM, discussed with RN  · Improved/resolved currently   · Hold AP/AC

## 2022-05-25 NOTE — ASSESSMENT & PLAN NOTE
CT AP: Persistent moderate right-sided hydroureteronephrosis without a discrete ureteral calculus  Devon Felty is however a large bladder mass now seen which may reflect hematoma and/or neoplasm  Devon Felty is possible soft tissue density extending into the right   ureterovesical junction     · Urine culture with no growth  · Appreciate urology inputs   · 3-way Webb catheter placed on 5/23   · Continue with oxybutynin and Flomax  · Plan for cystoscopy, clot evacuation and possible transurethral resection of bladder tumor with ureteroscopy of the right distal ureter, potential ureteral biopsies and stent placement tomorrow     · Analgesics as needed, patient reports poor control regimen; will rotate from PO oxy to PO dilaudid

## 2022-05-25 NOTE — ASSESSMENT & PLAN NOTE
Lab Results   Component Value Date    HGBA1C 7 2 (H) 02/10/2022       Recent Labs     05/24/22  1046 05/24/22  1637 05/24/22 2121 05/25/22  0624   POCGLU 142* 186* 209* 70       Blood Sugar Average: Last 72 hrs:  · Continue Levemir 22 units q h s    · Correctional insulin with Accu-Cheks while inpatient  · Carb controlled diet  · Initiate hypoglycemia protocol  · Continue home dose Lyrica for neuropathy

## 2022-05-25 NOTE — CASE MANAGEMENT
Case Management Assessment & Discharge Planning Note    Patient name Arturo Sethi  Location [address] [facility] [address] [id number] MRN [id number]  : 1950 Date 2022       Current Admission Date: 2022  Current Admission Diagnosis:Gross hematuria   Patient Active Problem List    Diagnosis Date Noted    Problem with vascular access 2022    Gross hematuria 2022    Hydronephrosis of right kidney 2022    Mild cognitive impairment 2021    Subdural hygroma     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[facility] )     CKD (chronic kidney disease) 2021    Hand lesion 2020    Type 2 diabetes mellitus with chronic kidney disease ([place] [address] ) 10/14/2019    Type 2 diabetes mellitus with diabetic peripheral angiopathy without gangrene, with long-term current use of insulin ([place] [address] ) 2019    Hyperlipidemia 2019    Rash 2018    Medicare annual wellness visit, subsequent 2018    Screening for colon cancer 2018    Hepatitis B core antibody positive 2017    Stage 4 chronic kidney disease ([facility] ) 2017    Thrombocytopenia ([place] [address] ) 2017    History of CVA (cerebrovascular accident) 2017    Controlled diabetes mellitus with diabetic neuropathy, with long-term current use of insulin ([facility] ) 2017    Liver transplant status ([place] [address] ) 2017    History of immunosuppression therapy 2017    History of hepatitis C 2017    Pruritus 2016    Spondylosis of cervical region without myelopathy or radiculopathy 2016    Headache, chronic daily 2015    Vitamin D deficiency 2014    Occipital neuralgia 2014    Migraine headache 2014    BPPV (benign paroxysmal positional vertigo), unspecified laterality 2013    Cerebral arterial aneurysm 2013    Prurigo nodularis 10/15/2013    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    Erectile dysfunction of non-organic origin 2013    History of cirrhosis 07/09/2012    Insomnia 05/30/2012    Peripheral neuropathy 05/07/2012      LOS (days): 2  Geometric Mean LOS (GMLOS) (days): 2 10  Days to GMLOS:-0 3     OBJECTIVE:    Risk of Unplanned Readmission Score: 16 4      Current admission status: Inpatient    Preferred Pharmacy:   [address] [facility], [address] [place] [id number]  Phone: [phone] Fax: [phone]    Primary Care Provider: Ross Chavez DO    Primary Insurance: [facility]  Secondary Insurance:     ASSESSMENT:  Kathia 26 Proxies    There are no active Health Care Proxies on file  Advance Directives  Does patient have a [address]?: No  Was patient offered paperwork?: Yes (declined)  Does patient currently have a Health Care decision maker?: No  Does patient have Advance Directives?: No  Was patient offered paperwork?:  (declined)  Primary Contact: Vamsi Mayer/SHERLYN [phone]    Readmission Root Cause  30 Day Readmission: No    Patient Information  Admitted from[de-identified] Home  Mental Status: Alert  During Assessment patient was accompanied by: Not accompanied during assessment  Assessment information provided by[de-identified] Patient  Primary Caregiver: Self  Support Systems: Self, Spouse/significant other  [place] of Residence: [address] [place] [address] do you live in?: [address] [place] [address] Street entry access options   Select all that apply : Stairs  Number of steps to enter home : 5  Do the steps have railings?: Yes  Type of Current Residence: [address] [place] home  Upon entering residence, is there a bedroom on the main floor (no further steps)?: Yes  Upon entering residence, is there a bathroom on the main floor (no further steps)?: Yes  In the last 12 months, was there a time when you were not able to pay the mortgage or rent on time?: No  In the last 12 months, how many places have you lived?: 1  In the last 12 months, was there a time when you did not have a steady place to sleep or slept in a shelter (including now)?: No  Living Arrangements: Lives w/ Spouse/significant other    Activities of Daily Living Prior to Admission  Functional Status: Independent  Completes ADLs independently?: Yes  Ambulates independently?: Yes  Does patient use assisted devices?: No  Does patient currently own DME?: No  Does patient have a history of Outpatient Therapy (PT/OT)?: No  Does the patient have a history of Short-Term Rehab?: No  Does patient have a history of HHC?: Yes (s/p liver transplant 2003)  Does patient currently have Vikram Lewis?: No         Patient Information Continued  Income Source: Pension/intermediate  Does patient have prescription coverage?: Yes  Within the past 12 months, you worried that your food would run out before you got the money to buy more : Never true  Within the past 12 months, the food you bought just didn't last and you didn't have money to get more : Never true  Food insecurity resource given?: No  Does patient receive dialysis treatments?: No  Does patient have a history of substance abuse?: Yes  Historical substance use preference: Alcohol/ETOH  Is patient currently in treatment for substance abuse?: N/A - sober  Does patient have a history of Mental Health Diagnosis?: No    PHQ 2/9 Screening   Reviewed PHQ 2/9 Depression Screening Score?: No    Means of Transportation  Means of Transport to Appts[de-identified] Drives Self  In the past 12 months, has lack of transportation kept you from meetings, work, or from getting things needed for daily living?: No  Was application for public transport provided?: No  DISCHARGE DETAILS:    Discharge planning discussed with[de-identified] patient        CM contacted family/caregiver?: No- see comments (declined)    Contacts  Patient Contacts: Verito PLATA  Relationship to Patient[de-identified] Family  Contact Method: Phone  Phone Number: [phone]  Reason/Outcome: Continuity of Care, Emergency Contact, Discharge Planning    DME Referral Provided  Referral made for DME?: No    Would you like to participate in our [address] service program?  : No - Declined       Discharge Destination Plan[de-identified] Home  Transport at Discharge : Family     Additional Comments: no d/c needs evaluated

## 2022-05-26 ENCOUNTER — TELEPHONE (OUTPATIENT)
Dept: UROLOGY | Facility: CLINIC | Age: 72
End: 2022-05-26

## 2022-05-26 ENCOUNTER — ANESTHESIA (INPATIENT)
Dept: PERIOP | Facility: HOSPITAL | Age: 72
DRG: 669 | End: 2022-05-26
Payer: COMMERCIAL

## 2022-05-26 ENCOUNTER — APPOINTMENT (INPATIENT)
Dept: RADIOLOGY | Facility: HOSPITAL | Age: 72
DRG: 669 | End: 2022-05-26
Payer: COMMERCIAL

## 2022-05-26 PROBLEM — N32.89 MASS OF BLADDER: Status: ACTIVE | Noted: 2022-05-26

## 2022-05-26 LAB
ANION GAP SERPL CALCULATED.3IONS-SCNC: 7 MMOL/L (ref 4–13)
BUN SERPL-MCNC: 42 MG/DL (ref 5–25)
CALCIUM SERPL-MCNC: 8.4 MG/DL (ref 8.3–10.1)
CHLORIDE SERPL-SCNC: 109 MMOL/L (ref 100–108)
CO2 SERPL-SCNC: 26 MMOL/L (ref 21–32)
CREAT SERPL-MCNC: 3.28 MG/DL (ref 0.6–1.3)
ERYTHROCYTE [DISTWIDTH] IN BLOOD BY AUTOMATED COUNT: 15 % (ref 11.6–15.1)
GFR SERPL CREATININE-BSD FRML MDRD: 17 ML/MIN/1.73SQ M
GLUCOSE SERPL-MCNC: 109 MG/DL (ref 65–140)
GLUCOSE SERPL-MCNC: 152 MG/DL (ref 65–140)
GLUCOSE SERPL-MCNC: 264 MG/DL (ref 65–140)
GLUCOSE SERPL-MCNC: 294 MG/DL (ref 65–140)
GLUCOSE SERPL-MCNC: 44 MG/DL (ref 65–140)
GLUCOSE SERPL-MCNC: 45 MG/DL (ref 65–140)
GLUCOSE SERPL-MCNC: 92 MG/DL (ref 65–140)
HCT VFR BLD AUTO: 26 % (ref 36.5–49.3)
HGB BLD-MCNC: 8 G/DL (ref 12–17)
MCH RBC QN AUTO: 24.1 PG (ref 26.8–34.3)
MCHC RBC AUTO-ENTMCNC: 30.8 G/DL (ref 31.4–37.4)
MCV RBC AUTO: 78 FL (ref 82–98)
PLATELET # BLD AUTO: 101 THOUSANDS/UL (ref 149–390)
PMV BLD AUTO: 12.6 FL (ref 8.9–12.7)
POTASSIUM SERPL-SCNC: 4.3 MMOL/L (ref 3.5–5.3)
RBC # BLD AUTO: 3.32 MILLION/UL (ref 3.88–5.62)
SODIUM SERPL-SCNC: 142 MMOL/L (ref 136–145)
WBC # BLD AUTO: 9.19 THOUSAND/UL (ref 4.31–10.16)

## 2022-05-26 PROCEDURE — 85027 COMPLETE CBC AUTOMATED: CPT | Performed by: PHYSICIAN ASSISTANT

## 2022-05-26 PROCEDURE — 0TBB8ZZ EXCISION OF BLADDER, VIA NATURAL OR ARTIFICIAL OPENING ENDOSCOPIC: ICD-10-PCS | Performed by: UROLOGY

## 2022-05-26 PROCEDURE — C1769 GUIDE WIRE: HCPCS | Performed by: UROLOGY

## 2022-05-26 PROCEDURE — 88342 IMHCHEM/IMCYTCHM 1ST ANTB: CPT | Performed by: PATHOLOGY

## 2022-05-26 PROCEDURE — 88307 TISSUE EXAM BY PATHOLOGIST: CPT | Performed by: PATHOLOGY

## 2022-05-26 PROCEDURE — 88341 IMHCHEM/IMCYTCHM EA ADD ANTB: CPT | Performed by: PATHOLOGY

## 2022-05-26 PROCEDURE — 52240 CYSTOSCOPY AND TREATMENT: CPT | Performed by: UROLOGY

## 2022-05-26 PROCEDURE — C1758 CATHETER, URETERAL: HCPCS | Performed by: UROLOGY

## 2022-05-26 PROCEDURE — 99232 SBSQ HOSP IP/OBS MODERATE 35: CPT | Performed by: INTERNAL MEDICINE

## 2022-05-26 PROCEDURE — 80048 BASIC METABOLIC PNL TOTAL CA: CPT | Performed by: PHYSICIAN ASSISTANT

## 2022-05-26 PROCEDURE — 82948 REAGENT STRIP/BLOOD GLUCOSE: CPT

## 2022-05-26 PROCEDURE — 99223 1ST HOSP IP/OBS HIGH 75: CPT | Performed by: INTERNAL MEDICINE

## 2022-05-26 RX ORDER — CEFAZOLIN SODIUM 1 G/50ML
1000 SOLUTION INTRAVENOUS ONCE
Status: DISCONTINUED | OUTPATIENT
Start: 2022-05-26 | End: 2022-05-27

## 2022-05-26 RX ORDER — ATROPA BELLADONNA AND OPIUM 16.2; 3 MG/1; MG/1
SUPPOSITORY RECTAL AS NEEDED
Status: DISCONTINUED | OUTPATIENT
Start: 2022-05-26 | End: 2022-05-26 | Stop reason: HOSPADM

## 2022-05-26 RX ORDER — PROPOFOL 10 MG/ML
INJECTION, EMULSION INTRAVENOUS AS NEEDED
Status: DISCONTINUED | OUTPATIENT
Start: 2022-05-26 | End: 2022-05-26

## 2022-05-26 RX ORDER — HYDROMORPHONE HCL/PF 1 MG/ML
0.4 SYRINGE (ML) INJECTION
Status: DISCONTINUED | OUTPATIENT
Start: 2022-05-26 | End: 2022-05-26 | Stop reason: HOSPADM

## 2022-05-26 RX ORDER — PHENYLEPHRINE HYDROCHLORIDE 10 MG/ML
INJECTION INTRAVENOUS AS NEEDED
Status: DISCONTINUED | OUTPATIENT
Start: 2022-05-26 | End: 2022-05-26

## 2022-05-26 RX ORDER — LIDOCAINE HYDROCHLORIDE 20 MG/ML
INJECTION, SOLUTION EPIDURAL; INFILTRATION; INTRACAUDAL; PERINEURAL AS NEEDED
Status: DISCONTINUED | OUTPATIENT
Start: 2022-05-26 | End: 2022-05-26

## 2022-05-26 RX ORDER — FUROSEMIDE 10 MG/ML
INJECTION INTRAMUSCULAR; INTRAVENOUS AS NEEDED
Status: DISCONTINUED | OUTPATIENT
Start: 2022-05-26 | End: 2022-05-26

## 2022-05-26 RX ORDER — FENTANYL CITRATE/PF 50 MCG/ML
25 SYRINGE (ML) INJECTION
Status: COMPLETED | OUTPATIENT
Start: 2022-05-26 | End: 2022-05-26

## 2022-05-26 RX ORDER — GLYCOPYRROLATE 0.2 MG/ML
INJECTION INTRAMUSCULAR; INTRAVENOUS AS NEEDED
Status: DISCONTINUED | OUTPATIENT
Start: 2022-05-26 | End: 2022-05-26

## 2022-05-26 RX ORDER — ROCURONIUM BROMIDE 10 MG/ML
INJECTION, SOLUTION INTRAVENOUS AS NEEDED
Status: DISCONTINUED | OUTPATIENT
Start: 2022-05-26 | End: 2022-05-26

## 2022-05-26 RX ORDER — ONDANSETRON 2 MG/ML
INJECTION INTRAMUSCULAR; INTRAVENOUS AS NEEDED
Status: DISCONTINUED | OUTPATIENT
Start: 2022-05-26 | End: 2022-05-26

## 2022-05-26 RX ORDER — METOCLOPRAMIDE HYDROCHLORIDE 5 MG/ML
10 INJECTION INTRAMUSCULAR; INTRAVENOUS ONCE AS NEEDED
Status: DISCONTINUED | OUTPATIENT
Start: 2022-05-26 | End: 2022-05-26 | Stop reason: HOSPADM

## 2022-05-26 RX ORDER — FENTANYL CITRATE 50 UG/ML
INJECTION, SOLUTION INTRAMUSCULAR; INTRAVENOUS AS NEEDED
Status: DISCONTINUED | OUTPATIENT
Start: 2022-05-26 | End: 2022-05-26

## 2022-05-26 RX ORDER — NEOSTIGMINE METHYLSULFATE 1 MG/ML
INJECTION INTRAVENOUS AS NEEDED
Status: DISCONTINUED | OUTPATIENT
Start: 2022-05-26 | End: 2022-05-26

## 2022-05-26 RX ORDER — MAGNESIUM HYDROXIDE 1200 MG/15ML
LIQUID ORAL AS NEEDED
Status: DISCONTINUED | OUTPATIENT
Start: 2022-05-26 | End: 2022-05-26 | Stop reason: HOSPADM

## 2022-05-26 RX ORDER — CEFAZOLIN SODIUM 1 G/3ML
INJECTION, POWDER, FOR SOLUTION INTRAMUSCULAR; INTRAVENOUS AS NEEDED
Status: DISCONTINUED | OUTPATIENT
Start: 2022-05-26 | End: 2022-05-26

## 2022-05-26 RX ORDER — DEXTROSE MONOHYDRATE 25 G/50ML
INJECTION, SOLUTION INTRAVENOUS
Status: COMPLETED
Start: 2022-05-26 | End: 2022-05-26

## 2022-05-26 RX ORDER — ONDANSETRON 2 MG/ML
4 INJECTION INTRAMUSCULAR; INTRAVENOUS ONCE AS NEEDED
Status: DISCONTINUED | OUTPATIENT
Start: 2022-05-26 | End: 2022-05-26 | Stop reason: HOSPADM

## 2022-05-26 RX ADMIN — NEOSTIGMINE METHYLSULFATE 1 MG: 1 INJECTION INTRAVENOUS at 08:32

## 2022-05-26 RX ADMIN — Medication 25 MCG: at 09:01

## 2022-05-26 RX ADMIN — HYDROMORPHONE HYDROCHLORIDE 0.5 MG: 1 INJECTION, SOLUTION INTRAMUSCULAR; INTRAVENOUS; SUBCUTANEOUS at 12:35

## 2022-05-26 RX ADMIN — FENTANYL CITRATE 50 MCG: 50 INJECTION, SOLUTION INTRAMUSCULAR; INTRAVENOUS at 07:49

## 2022-05-26 RX ADMIN — PHENYLEPHRINE HYDROCHLORIDE 175 MCG: 10 INJECTION INTRAVENOUS at 07:58

## 2022-05-26 RX ADMIN — TAMSULOSIN HYDROCHLORIDE 0.4 MG: 0.4 CAPSULE ORAL at 16:48

## 2022-05-26 RX ADMIN — SODIUM CHLORIDE, SODIUM GLUCONATE, SODIUM ACETATE, POTASSIUM CHLORIDE, MAGNESIUM CHLORIDE, SODIUM PHOSPHATE, DIBASIC, AND POTASSIUM PHOSPHATE 75 ML/HR: .53; .5; .37; .037; .03; .012; .00082 INJECTION, SOLUTION INTRAVENOUS at 09:25

## 2022-05-26 RX ADMIN — FUROSEMIDE 10 MG: 10 INJECTION, SOLUTION INTRAMUSCULAR; INTRAVENOUS at 08:22

## 2022-05-26 RX ADMIN — FENTANYL CITRATE 25 MCG: 50 INJECTION, SOLUTION INTRAMUSCULAR; INTRAVENOUS at 07:41

## 2022-05-26 RX ADMIN — PHENYLEPHRINE HYDROCHLORIDE 25 MCG: 10 INJECTION INTRAVENOUS at 07:35

## 2022-05-26 RX ADMIN — ROCURONIUM BROMIDE 20 MG: 50 INJECTION, SOLUTION INTRAVENOUS at 07:57

## 2022-05-26 RX ADMIN — Medication 25 MCG: at 08:50

## 2022-05-26 RX ADMIN — PREGABALIN 50 MG: 50 CAPSULE ORAL at 16:48

## 2022-05-26 RX ADMIN — ONDANSETRON 4 MG: 2 INJECTION INTRAMUSCULAR; INTRAVENOUS at 08:26

## 2022-05-26 RX ADMIN — PROPOFOL 80 MG: 10 INJECTION, EMULSION INTRAVENOUS at 07:35

## 2022-05-26 RX ADMIN — ROCURONIUM BROMIDE 10 MG: 50 INJECTION, SOLUTION INTRAVENOUS at 08:13

## 2022-05-26 RX ADMIN — HYDROMORPHONE HYDROCHLORIDE 0.4 MG: 1 INJECTION, SOLUTION INTRAMUSCULAR; INTRAVENOUS; SUBCUTANEOUS at 09:10

## 2022-05-26 RX ADMIN — HYDROMORPHONE HYDROCHLORIDE 4 MG: 4 TABLET ORAL at 19:36

## 2022-05-26 RX ADMIN — CEFAZOLIN SODIUM 1000 MG: 1 INJECTION, POWDER, FOR SOLUTION INTRAMUSCULAR; INTRAVENOUS at 07:30

## 2022-05-26 RX ADMIN — Medication 25 MCG: at 08:57

## 2022-05-26 RX ADMIN — TACROLIMUS 1 MG: 1 CAPSULE ORAL at 21:56

## 2022-05-26 RX ADMIN — GLYCOPYRROLATE 0.6 MCG: 0.2 INJECTION, SOLUTION INTRAMUSCULAR; INTRAVENOUS at 08:20

## 2022-05-26 RX ADMIN — INSULIN LISPRO 2 UNITS: 100 INJECTION, SOLUTION INTRAVENOUS; SUBCUTANEOUS at 21:56

## 2022-05-26 RX ADMIN — TACROLIMUS 1 MG: 1 CAPSULE ORAL at 10:29

## 2022-05-26 RX ADMIN — LIDOCAINE HYDROCHLORIDE 40 MG: 20 INJECTION, SOLUTION EPIDURAL; INFILTRATION; INTRACAUDAL at 07:58

## 2022-05-26 RX ADMIN — PREGABALIN 50 MG: 50 CAPSULE ORAL at 10:28

## 2022-05-26 RX ADMIN — HYDROMORPHONE HYDROCHLORIDE 4 MG: 4 TABLET ORAL at 10:14

## 2022-05-26 RX ADMIN — ZOLPIDEM TARTRATE 10 MG: 5 TABLET ORAL at 21:56

## 2022-05-26 RX ADMIN — INSULIN DETEMIR 22 UNITS: 100 INJECTION, SOLUTION SUBCUTANEOUS at 21:56

## 2022-05-26 RX ADMIN — TEMAZEPAM 30 MG: 15 CAPSULE ORAL at 21:56

## 2022-05-26 RX ADMIN — Medication 25 MCG: at 08:53

## 2022-05-26 RX ADMIN — INSULIN LISPRO 1 UNITS: 100 INJECTION, SOLUTION INTRAVENOUS; SUBCUTANEOUS at 16:48

## 2022-05-26 RX ADMIN — PREGABALIN 50 MG: 50 CAPSULE ORAL at 21:56

## 2022-05-26 RX ADMIN — METHYLENE BLUE 50 MG: 5 INJECTION INTRAVENOUS at 08:11

## 2022-05-26 RX ADMIN — LIDOCAINE HYDROCHLORIDE 60 MG: 20 INJECTION, SOLUTION EPIDURAL; INFILTRATION; INTRACAUDAL at 07:35

## 2022-05-26 RX ADMIN — FENTANYL CITRATE 25 MCG: 50 INJECTION, SOLUTION INTRAMUSCULAR; INTRAVENOUS at 08:07

## 2022-05-26 RX ADMIN — NEOSTIGMINE METHYLSULFATE 4 MG: 1 INJECTION INTRAVENOUS at 08:20

## 2022-05-26 RX ADMIN — DEXTROSE MONOHYDRATE 50 ML: 25 INJECTION, SOLUTION INTRAVENOUS at 06:21

## 2022-05-26 RX ADMIN — SODIUM CHLORIDE, SODIUM GLUCONATE, SODIUM ACETATE, POTASSIUM CHLORIDE, MAGNESIUM CHLORIDE, SODIUM PHOSPHATE, DIBASIC, AND POTASSIUM PHOSPHATE 75 ML/HR: .53; .5; .37; .037; .03; .012; .00082 INJECTION, SOLUTION INTRAVENOUS at 21:58

## 2022-05-26 NOTE — ASSESSMENT & PLAN NOTE
Lab Results   Component Value Date    HGBA1C 7 2 (H) 02/10/2022       Recent Labs     05/25/22  2113 05/26/22  0611 05/26/22  0624 05/26/22  0849   POCGLU 172* 45* 294* 92       · Home med : Levemir 22 hs  · Dose of Levemir was decreased due to hypoglycemia   Received 12 units last night  · Now hyperglycemic  · Increase Lantus to 16 hs  · Change diet to diabetic  · Ct SSI  · Monitor blood sugars and adjust insulin as needed

## 2022-05-26 NOTE — QUICK NOTE
Patient seen postop  Urine is clear with no CBI infusing, light blue after methylene blue administration  I discussed again with him the findings of the intraoperative surgery, the concern of the large posterior right bladder tumor with the high likelihood of urothelial bladder cancer  We discussed the concern for invasion into deeper layers of the bladder although final pathology will determine clinical staging  I discussed with him my concern that this does at least it obstruct if not invade into the right distal ureter although this could not be assessed given inability to find the ureteral orifice  At this point time, will recommend the patient stay off CBI  Catheter should remain in place until early next week  We will reassess for hematuria tomorrow and perform a renal bladder ultrasound to look for progressive right hydronephrosis  If the patient is doing well tomorrow with no additional concerns, can consider discharge home from urologic standpoint  After my discussion with the patient, I again tried to reach his significant other Trista Starr  I made this phone call from the room but both the home and cell phone numbers go to voicemail  We will continue to try to provide updates to the family

## 2022-05-26 NOTE — CONSULTS
Consultation - Nephrology   Marlena Garcia 74 y o  male MRN: [id number]  Unit/Bed#: -01 Encounter: [id number]      Assessment/Plan:  1  Acute kidney injury most likely secondary to obstructive uropathy given right-sided hydronephrosis from bladder mass  2  Chronic kidney disease stage 4 with previous baseline creatinine 1 7-2 2 although since March has been between 2 8 in 2 9 although that may be secondary to developing hydronephrosis  3  Gross hematuria, overall appears to be improved status post cystoscopy showing 5 cm mass, biopsy pending  4  Right-sided hydronephrosis secondary to bladder mass, unable to place a ureteral stent, may need percutaneous nephrostomy tube placement  5  Diabetes with associated diabetic kidney disease  6  Liver transplant currently on tacrolimus, check Prograf level  7  Anemia of chronic kidney disease hemoglobin currently 8 0    Plan:  · Would continue with IV fluids given worsening renal failure  · Again progression recently may be secondary to developing right-sided hydronephrosis secondary to bladder mass  · Discussed with Urology, anticipating repeat renal ultrasound if persistent hydronephrosis patient will likely need percutaneous nephrostomy tube placement  · Check Prograf level  · No other changes in current regimen    History of Present Illness   Physician Requesting Consult: Masoud Bernard MD  Reason for Consult / Principal Problem:  Acute kidney injury  HPI: Marlena Garcia is a 74y o  year old male who presents with gross hematuria  Patient is a 77-year-old male who presented with gross hematuria  Patient has a known history of CKD stage 4, liver transplant, diabetes, anemia and history of CVA  Had been doing well until the day prior to admission when he noted bright red blood with associated clot with urination  Unfortunately persisted with associated suprapubic pain and tenderness  No reports of abdominal pain, nausea vomiting or diarrhea    No recent illnesses or new medications  Presentation creatinine was 2 94 currently 3 28  Previous baseline creatinine prior to March was 1 7-2 2      History obtained from child, chart review and the patient    Review of Systems    Pertinent findings of a 10 point review of systems noted above otherwise all others negative    Historical Information   Patient Active Problem List   Diagnosis    History of CVA (cerebrovascular accident)    Controlled diabetes mellitus with diabetic neuropathy, with long-term current use of insulin ([place] [address] )    Liver transplant status ([place] [address] )    History of immunosuppression therapy    History of hepatitis C    Stage 4 chronic kidney disease (HCC)    Thrombocytopenia (HCC)    Congenital anomaly of accessory auricle    Cerebral arterial aneurysm    Erectile dysfunction of non-organic origin    Headache, chronic daily    Insomnia    Migraine headache    Occipital neuralgia    History of cirrhosis    Peripheral neuropathy    Prurigo nodularis    Pruritus    Spondylosis of cervical region without myelopathy or radiculopathy    BPPV (benign paroxysmal positional vertigo), unspecified laterality    Vitamin D deficiency    Medicare annual wellness visit, subsequent    Screening for colon cancer    Hepatitis B core antibody positive    Rash    Hyperlipidemia    Type 2 diabetes mellitus with diabetic peripheral angiopathy without gangrene, with long-term current use of insulin (HCC)    Type 2 diabetes mellitus with chronic kidney disease ([facility] )    Hand lesion    CKD (chronic kidney disease)    Subdural hygroma    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[facility] )    Mild cognitive impairment    Gross hematuria    Hydronephrosis of right kidney    Problem with vascular access    Mass of bladder     Past Medical History:   Diagnosis Date    Alcoholism ([place] [address] )     Cerebral artery aneurysm     Change in mental state     last assessed 5/18/15; resolved 10/27/15    Diabetes mellitus ([place] [address] )  Drug dependence ([facility] )     Fatigue     last assessed 1/26/15; resolved 5/24/16    Hepatitis [address] discharge follow-up 12/21/2018    Kidney disease     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[facility] )     [facility])     Renal disorder     Subdural hygroma     2/27/14; resolved 7/28/15    Thrombocytopenia ([facility] ) 9/20/2017     Past Surgical History:   Procedure Laterality Date    BRAIN SURGERY  02/12/2014    left frontotemporal 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    CATARACT EXTRACTION      CHOLECYSTECTOMY      FL LUMBAR PUNCTURE DIAGNOSTIC  12/14/2018    IR PICC LINE  12/14/2018    LIVER TRANSPLANTATION      ROTATOR CUFF REPAIR      SHOULDER SURGERY       Social History   Social History     Substance and Sexual Activity   Alcohol Use Not Currently     Social History     Substance and Sexual Activity   Drug Use No    Comment: remotely quit drug use per [employer]scripts      Social History     Tobacco Use   Smoking Status Never Smoker   Smokeless Tobacco Never Used   Tobacco Comment    former smoker per [employer]scripts      Family History   Problem Relation Age of Onset    Hypertension Mother         benign essential     Lung cancer Father     Diabetes Sister     Cancer Brother     Stroke Other         cva - due to embolism of cerebra artery        Meds/Allergies   current meds:   Current Facility-Administered Medications   Medication Dose Route Frequency    acetaminophen (TYLENOL) tablet 650 mg  650 mg Oral Q6H PRN    belladonna-opium (B&O SUPPOSITORY) 16 2-30 mg suppository 1 suppository  30 mg Rectal Q8H PRN    ceFAZolin (ANCEF) IVPB (premix in dextrose) 1,000 mg 50 mL  1,000 mg Intravenous Once    ceFAZolin (ANCEF) IVPB (premix in dextrose) 1,000 mg 50 mL  1,000 mg Intravenous Once    HYDROmorphone (DILAUDID) injection 0 5 mg  0 5 mg Intravenous Q4H PRN    HYDROmorphone (DILAUDID) tablet 2 mg  2 mg Oral Q4H PRN    HYDROmorphone (DILAUDID) tablet 4 mg  4 mg Oral Q4H PRN    insulin detemir (LEVEMIR) subcutaneous injection 22 Units  22 Units Subcutaneous HS    insulin lispro (HumaLOG) 100 units/mL subcutaneous injection 1-5 Units  1-5 Units Subcutaneous TID AC    insulin lispro (HumaLOG) 100 units/mL subcutaneous injection 1-5 Units  1-5 Units Subcutaneous HS    multi-electrolyte (PLASMALYTE-A/ISOLYTE-S PH 7 4) IV solution  75 mL/hr Intravenous Continuous    ondansetron (ZOFRAN) injection 4 mg  4 mg Intravenous Q6H PRN    oxybutynin (DITROPAN) tablet 5 mg  5 mg Oral TID PRN    pregabalin (LYRICA) capsule 50 mg  50 mg Oral TID    tacrolimus (PROGRAF) capsule 1 mg  1 mg Oral Q12H    tamsulosin (FLOMAX) capsule 0 4 mg  0 4 mg Oral Daily With Dinner    temazepam (RESTORIL) capsule 30 mg  30 mg Oral HS    zolpidem (AMBIEN) tablet 10 mg  10 mg Oral HS       Allergies   Allergen Reactions    Tequin [Gatifloxacin] Rash    Ciprofloxacin     Iv Contrast [Iodinated Diagnostic Agents]     Levofloxacin Rash    Lipitor [Atorvastatin] Rash     Rash and itching    Omnipaque [Iohexol]          Objective   /62   Pulse 90   Temp 98 °F (36 7 °C)   Resp 17   SpO2 (!) 88%     Intake/Output Summary (Last 24 hours) at 5/26/2022 1217  Last data filed at 5/26/2022 7388  Gross per 24 hour   Intake 1900 ml   Output 3660 ml   Net -1760 ml       Current Weight:      Physical Exam  Constitutional:       Appearance: He is not ill-appearing  HENT:      Head: Normocephalic and atraumatic  Mouth/Throat:      Mouth: Mucous membranes are moist       Pharynx: Oropharynx is clear  Eyes:      General: No scleral icterus  Cardiovascular:      Rate and Rhythm: Normal rate and regular rhythm  Pulmonary:      Effort: Pulmonary effort is normal       Breath sounds: Normal breath sounds  Abdominal:      Palpations: Abdomen is soft  Tenderness: There is abdominal tenderness (Suprapubic)  Musculoskeletal:         General: No deformity        Right lower leg: No edema  Left lower leg: No edema  Lymphadenopathy:      Cervical: No cervical adenopathy  Skin:     General: Skin is warm and dry  Findings: No rash  Neurological:      Mental Status: He is alert and oriented to person, place, and time             Lab Results:    Results from last 7 days   Lab Units 05/26/22  0522   WBC Thousand/uL 9 19   HEMOGLOBIN g/dL 8 0*   HEMATOCRIT % 26 0*   PLATELETS Thousands/uL 101*     Results from last 7 days   Lab Units 05/26/22  0522   POTASSIUM mmol/L 4 3   CHLORIDE mmol/L 109*   CO2 mmol/L 26   BUN mg/dL 42*   CREATININE mg/dL 3 28*   CALCIUM mg/dL 8 4

## 2022-05-26 NOTE — QUICK NOTE
Patient seen examined in the prep and hold area  He remains comfortable with catheter in place  Urine is now clear yellow  Right upper extremity PICC line has now been placed  Right side is been marked in preparation for a cystoscopy with possible transurethral resection of bladder tumor, possible clot evacuation, right retrograde pyelogram with possible ureteroscopy and biopsy of upper tract ureteral tumor with stent placement  Informed consent previously signed      /77   Pulse (!) 106   Temp 98 3 °F (36 8 °C)   Resp 18   SpO2 99%

## 2022-05-26 NOTE — OP NOTE
OPERATIVE REPORT  PATIENT NAME: Radha Fitzgerald    :  1950  MRN: [id number]  Pt Location: [facility] ROOM 01    SURGERY DATE: 2022    Surgeon(s) and Role:     * Shahana Perez MD - Primary    Preop Diagnosis:  Hematuria [R31 9]  Hydronephrosis of right kidney [N13 30]  Lesion of bladder [N32 9]    Post-Op Diagnosis Codes:     * Hematuria [R31 9]     * Hydronephrosis of right kidney [N13 30]     * Lesion of bladder [N32 9]    Procedure(s) (LRB):  TRANSURETHRAL RESECTION OF BLADDER TUMOR (TURBT) (N/A)    Specimen(s):  ID Type Source Tests Collected by Time Destination   1 : Right posterior bladder tumor Tissue Urinary Bladder TISSUE EXAM Shahana Perez MD 2022 0799    2 : right bladder tumor deep Tissue Urinary Bladder TISSUE EXAM Shahana Perez MD 2022 4985        Estimated Blood Loss:   Minimal    Drains:  Continuous Bladder Irrigation Three-way (Active)   Irrigant Normal saline 22 0825   Number of days: 0       Anesthesia Type:   General    Operative Indications:  Hematuria [R31 9]  Hydronephrosis of right kidney [N13 30]  Lesion of bladder [N32 9]      Operative Findings:  1  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 encompassing the mid trigone, right trigone and of the right lateral wall  2  At no point was the right ureteral orifice visualized to be able to intubate perform retrograde pyelogram or ureteroscopy  3  After administration of methylene blue and Lasix, left ureteral orifice was identified the right ureteral orifice was not  4  Superficial and deep tumor sent as separate specimens, area fulgurated with button electrode, appearance of deep cancer invasion into muscle layer  5  Bimanual examination did not demonstrate fixation of the right posterior bladder, abdomen remains soft during and after resection    Complications:   None    Procedure and Technique:  Radha Fitzgerald is a 74y o -year-old male with a history of prior liver transplant 21 years ago  Patient had imaging findings of abnormality in the right posterior bladder and after presentation with gross hematuria and clot retention, we reviewed his films and agreed to proceed for clot evacuation with transurethral resection and possible inspection of the right upper tract with retrograde pyelogram, ureteroscopy and ureteral biopsy  Risk and benefits of surgery was discussed and reviewed  Informed consent was obtained  Patient was brought to the operating room on 5/26/2022  After the smooth induction of general LMA anesthesia, the patient was placed in the dorsal lithotomy position  His genitalia was prepped and draped in a sterile fashion  Intravenous antibiotics were administered in the form of Ancef  A timeout was performed with all members of the operative team confirming the patient's identity and procedure to be performed  Continuous flow resectoscope was placed  Careful inspection of the bladder demonstrated a large mid to right argenis trigone tumor transitioning of the lateral wall  This was greater than 5 cm in size  The right ureteral orifice could not be visualized  The tumor did appear to cross midline and began to impact the left trigone as well  After discussion with the anesthesia team, the patient was converted to a general endotracheal anesthetic with paralytic  With the bipolar electrode, loupe resection of the tumor was performed down to deep layer  Muscle fibers were noted  At no point were we able to visualize the right ureteral orifice  Methylene blue and Lasix were administered by the anesthesia service  Left ureteral orifice was identified and draining well without obstruction  Continued monitoring for drainage from the right side did not demonstrate opening and this was concerning for upper tract tumor involvement  We then performed point electrocautery with the loop to further monitor for any signs of methylene blue drainage from the right collecting system  There was none  A 2nd deep tumor margin was taken again muscle fibers were seen  Utilizing the button electrode, wide fulguration of the resected area was performed as there appeared to be residual tumor  Further resection for completion was deemed impossible given the likelihood of deep tumor through bladder wall  After hemostasis was obtained, the resectoscope was removed  A 24 [address] Webb catheter was placed  30 cc of sterile fluid was placed in balloon  Catheter was hooked to a low drip continuous bladder irrigation  Belladonna and opium suppository was placed per rectum and a bimanual examination was performed  The abdomen was soft and without signs of fluid extravasation or peritoneal signs  There was not concern for fixation at the right side of the bladder  Overall the patient tolerated the procedure well  The patient was extubated in the operating room and transferred to the PACU in stable condition at the conclusion of the case  Plan - will monitor short interval continuous bladder irrigation and wean    Patient will require outpatient follow-up for discussion of pathology although I am concerned about muscle invasive bladder cancer and the need for more definitive therapy    Patient Disposition:  PACU       SIGNATURE: Bong Alicea MD  DATE: May 26, 2022  TIME: 8:34 AM

## 2022-05-26 NOTE — TELEPHONE ENCOUNTER
Patient remains inpatient  Holding 06/10/22 at 11:30 am for pathology review  Will call patient once discharged to confirm appt

## 2022-05-26 NOTE — ASSESSMENT & PLAN NOTE
· New diagnosis  · Presented on 5/23 with gross hematuria  · CT A/P - large bladder mass with persistence of right sided hydronephrosis  · Large posterior bladder tumor noted on cystoscopy on 5/26  · S/p TURBT on 5/26/22  Concern for invasion into the deeper layers of the bladder  · Hematuria resolved  · Webb to be maintained till early next week per urology     · F/u pathology  · Urology input appreciated

## 2022-05-26 NOTE — ANESTHESIA PREPROCEDURE EVALUATION
Procedure:  TRANSURETHRAL RESECTION OF BLADDER TUMOR (TURBT) (N/A Bladder)  URETEROSCOPY, POSSIBLE URETERAL BIOPSY/LASER FULGURATION (Right Bladder)  CYSTOSCOPY RETROGRADE PYELOGRAM WITH INSERTION STENT URETERAL (Right Bladder)    Relevant Problems   ANESTHESIA (within normal limits)      CARDIO   (+) Cerebral arterial aneurysm   (+) Hyperlipidemia   (+) Migraine headache   (+) Type 2 diabetes mellitus with diabetic peripheral angiopathy without gangrene, with long-term current use of insulin (HCC)      ENDO   (+) Type 2 diabetes mellitus with chronic kidney disease (HCC)   (+) Type 2 diabetes mellitus with diabetic peripheral angiopathy without gangrene, with long-term current use of insulin (HCC)      /RENAL   (+) CKD (chronic kidney disease)   (+) Hydronephrosis of right kidney   (+) Stage 4 chronic kidney disease (HCC)      HEMATOLOGY   (+) Thrombocytopenia (HCC)      MUSCULOSKELETAL   (+) Spondylosis of cervical region without myelopathy or radiculopathy      NEURO/PSYCH   (+) Headache, chronic daily   (+) History of CVA (cerebrovascular accident)   (+) History of cirrhosis   (+) History of hepatitis C   (+) Migraine headache      Digestive   (+) Liver transplant status (HCC)      Endocrine   (+) Controlled diabetes mellitus with diabetic neuropathy, with long-term current use of insulin (HCC)      Nervous and Auditory   (+) BPPV (benign paroxysmal positional vertigo), unspecified laterality      Genitourinary   (+) Gross hematuria      Other   (+) History of immunosuppression therapy        Physical Exam    Airway    Mallampati score: II  TM Distance: >3 FB  Neck ROM: full     Dental   Comment: edentulous,     Cardiovascular      Pulmonary      Other Findings       Lab Results   Component Value Date    WBC 9 19 05/26/2022    HGB 8 0 (L) 05/26/2022     (L) 05/26/2022     Lab Results   Component Value Date    SODIUM 142 05/26/2022    K 4 3 05/26/2022    BUN 42 (H) 05/26/2022    CREATININE 3 28 (H) 05/26/2022    EGFR 17 05/26/2022    GLUCOSE 145 (H) 05/23/2021     Lab Results   Component Value Date    PTT 37 12/14/2018      Lab Results   Component Value Date    INR 1 10 05/23/2022       Blood type A    Lab Results   Component Value Date    HGBA1C 7 2 (H) 02/10/2022         Anesthesia Plan  ASA Score- 3     Anesthesia Type- general with ASA Monitors  Additional Monitors:   Airway Plan: LMA  Plan Factors-Exercise tolerance (METS): >4 METS  Chart reviewed  Existing labs reviewed  Patient summary reviewed  Patient is not a current smoker  Induction- intravenous  Postoperative Plan-     Informed Consent- Anesthetic plan and risks discussed with patient  I personally reviewed this patient with the CRNA  Discussed and agreed on the Anesthesia Plan with the CRNA  Deepak Kimbrough

## 2022-05-26 NOTE — QUICK NOTE
Patient seen and examined after urology procedure  Patient remains comfortable  Access with right upper extremity PICC line  S/p transurethral resection of bladder tumor, tumor appears to be greater than 5 cm encompassing the mid trigone  Right urethral orifice unable to be visualized, no stent placement  Biopsies of tumor sent for further management  Continue Webb/CBI, pain regimen in place, vitals stable, lab stable  Physical exam unchanged, notable mild tenderness to palpation bladder region  Family updated at bedside this morning  We will also have Nephrology consultation given elevated creatinine and unable to relieve right-sided hydronephrosis      DO Ricardo Dillard 73 Internal Medicine PGY-3

## 2022-05-26 NOTE — PROGRESS NOTES
INTERNAL MEDICINE RESIDENCY PROGRESS NOTE     Name: Ayanna Parsons   Age & Sex: 74 y o  male   MRN: [id number]  Unit/Bed#: -01   Encounter: [id number]  Team: SLIM    PATIENT INFORMATION     Name: Ayanna Parsons   Age & Sex: 74 y o  male   MRN: [id number]  Hospital Stay Days: 3    ASSESSMENT/PLAN     Principal Problem: Mass of bladder  Active Problems:    History of CVA (cerebrovascular accident)    Liver transplant status ([facility] [address] )    Stage 4 chronic kidney disease (HCC)    Type 2 diabetes mellitus with diabetic peripheral angiopathy without gangrene, with long-term current use of insulin (HCC)    Gross hematuria    Hydronephrosis of right kidney    Problem with vascular access      Problem with vascular access  Assessment & Plan  · Status post R brachial PICC placement on 5/24    Hydronephrosis of right kidney  Assessment & Plan  CT AP: Persistent moderate right-sided hydroureteronephrosis without a discrete ureteral calculus  Francesca Robbins is however a large bladder mass now seen which may reflect hematoma and/or neoplasm  Francesca Robbins is possible soft tissue density extending into the right ureterovesical junction     · Urine culture with no growth  · 3-way Webb catheter placed on 5/23   · Continue with oxybutynin and Flomax  · Cystoscopy completed 05/26/2022 unable to stent hydronephrosis secondary to mass  · Nephrology consulted, repeat kidney/bladder ultrasound tomorrow  · Analgesics plan as needed    Gross hematuria  Assessment & Plan  Gross hematuria as noted by patient with clots  · UA with trace leukocytes and urine culture negative  · CT AP with right sided hydro and bladder mass concern for hematoma and/or neoplasm  · Status post Webb catheter insertion and initiation of CBI on 5/23   · Urology following; urological procedure 05/26/22 shows 5 cm mass, unable to stent right-sided hydronephrosis with mass blocking right ureteral orifice  Await mass biopsies for definitive management    · Trend serial hemoglobins  · Hold AP/AC    Type 2 diabetes mellitus with diabetic peripheral angiopathy without gangrene, with long-term current use of insulin [facility])  Assessment & Plan  Lab Results   Component Value Date    HGBA1C 7 2 (H) 02/10/2022       Recent Labs     05/25/22  2113 05/26/22  0611 05/26/22  0624 05/26/22  0849   POCGLU 172* 45* 294* 92       Blood Sugar Average: Last 72 hrs:  · Continue Levemir 22 units q h s  · Correctional insulin with Accu-Cheks while inpatient  · Carb controlled diet  · Initiate hypoglycemia protocol  · Continue home dose Lyrica for neuropathy    Stage 4 chronic kidney disease [facility])  Assessment & Plan  Lab Results   Component Value Date    EGFR 17 05/26/2022    EGFR 18 05/25/2022    EGFR 20 05/23/2022    CREATININE 3 28 (H) 05/26/2022    CREATININE 3 08 (H) 05/25/2022    CREATININE 2 94 (H) 05/23/2022   · Follows with [facility] nephrology  · Creatinine currently at baseline of approximately 2 9  · Was referred back to [facility] transplant for potential renal transplant in future  · Avoid nephrotoxins and hypotension   · Monitor BMP daily  · Nephrology consulted, appreciate recommendations, urology unable to stent right-sided hydronephrosis secondary to tumor, awaiting biopsies for further management    Liver transplant status [facility])  Assessment & Plan  · History of liver transplant in 2003, maintained on Tacrolimus managed by [facility] transplant  · Continue home dose of Prograf 1 mg q 12 hours  · Tacrolimus level WDL  · Appreciate GI inputs     History of CVA (cerebrovascular accident)  Assessment & Plan  · History of prior CVA, currently without new focal deficits  · Plavix on hold given gross hematuria, resume when cleared with Urology     * Mass of bladder  Assessment & Plan  CT abdomen/pelvis with contrast; large bladder mass which measures approximately 7 4 x 6 6 cm  Possible soft tissue mass extending into the right ureterovesical junction     05/26/2022 Cystoscopy with Urology; findings include 5 cm mass, unable to stent right-sided hydronephrosis with mass blocking right ureteral orifice  Follow biopsy results for more definitive management  Obtain PSA, urology following, gross hematuria improved after procedure      Disposition:  OR procedure today with Urology, updates given to family member at bedside  SUBJECTIVE     Patient seen and examined  Patient this morning went to OR for urology procedure  5 cm tumor noted, unfortunately right ureteral orifice not visualized, no stent, biopsy sent  Patient after procedure having lower abdominal tenderness, controlled with pain regimen  OBJECTIVE     Vitals:    22 0900 22 0915 22 0930 22 0948   BP: 133/67 106/71  123/62   Pulse: 88 90 88 90   Resp:  17    Temp:  98 2 °F (36 8 °C)  98 °F (36 7 °C)   TempSrc:       SpO2: 91% 96% 97% (!) 88%      Temperature:   Temp (24hrs), Av 1 °F (36 7 °C), Min:97 9 °F (36 6 °C), Max:98 3 °F (36 8 °C)    Temperature: 98 °F (36 7 °C)  Intake & Output:  I/O        0701   0700  0701   07 0701   0700    I V   1000 900    Total Intake  1000 900    Urine 35061 5000 650    Stool       Blood   10    Total Output 72868 5000 660    Net -25326 -4000 +240               Weights: There is no height or weight on file to calculate BMI  Weight (last 2 days)     None        Physical Exam  Constitutional:       General: He is not in acute distress  Appearance: He is normal weight  HENT:      Head: Normocephalic and atraumatic  Eyes:      General: No scleral icterus  Pupils: Pupils are equal, round, and reactive to light  Cardiovascular:      Rate and Rhythm: Normal rate and regular rhythm  Pulses: Normal pulses  Heart sounds: No murmur heard  Pulmonary:      Effort: Pulmonary effort is normal  No respiratory distress  Breath sounds: No wheezing or rales  Abdominal:      General: Bowel sounds are normal  There is no distension  Tenderness: There is no abdominal tenderness  Comments: Lower abdomen tenderness noted postprocedure   Musculoskeletal:         General: No swelling  Normal range of motion  Skin:     General: Skin is warm and dry  Capillary Refill: Capillary refill takes less than 2 seconds  Neurological:      General: No focal deficit present  Mental Status: He is alert and oriented to person, place, and time  Mental status is at baseline  Psychiatric:         Mood and Affect: Mood normal          Behavior: Behavior normal        LABORATORY DATA     Labs: I have personally reviewed pertinent reports  Results from last 7 days   Lab Units 05/26/22  0522 05/25/22  1103 05/23/22  1804 05/23/22  0543   WBC Thousand/uL 9 19 14 54*  --  7 59   HEMOGLOBIN g/dL 8 0* 9 0* 10 4* 9 7*   HEMATOCRIT % 26 0* 28 5* 33 2* 31 7*   PLATELETS Thousands/uL 101* 104*  --  162   NEUTROS PCT %  --   --   --  49   MONOS PCT %  --   --   --  9      Results from last 7 days   Lab Units 05/26/22 0522 05/25/22  1103 05/23/22  0638 05/23/22  0543   POTASSIUM mmol/L 4 3 4 7 4 5 6 0*   CHLORIDE mmol/L 109* 107  --  105   CO2 mmol/L 26 27  --  29   BUN mg/dL 42* 40*  --  42*   CREATININE mg/dL 3 28* 3 08*  --  2 94*   CALCIUM mg/dL 8 4 9 1  --  9 0   ALK PHOS U/L  --   --   --  85   ALT U/L  --   --   --  25   AST U/L  --   --   --  57*              Results from last 7 days   Lab Units 05/23/22  0543   INR  1 10               IMAGING & DIAGNOSTIC TESTING     Radiology Results: I have personally reviewed pertinent reports  CT abdomen pelvis wo contrast    Result Date: 5/23/2022  Impression: Persistent moderate right-sided hydroureteronephrosis without a discrete ureteral calculus  There is however a large bladder mass now seen which may reflect hematoma and/or neoplasm  There is possible soft tissue density extending into the right ureterovesical junction  This can be further evaluated with cystoscopy   Workstation performed: [id number]     Other Diagnostic Testing: I have personally reviewed pertinent reports  ACTIVE MEDICATIONS     Current Facility-Administered Medications   Medication Dose Route Frequency    acetaminophen (TYLENOL) tablet 650 mg  650 mg Oral Q6H PRN    belladonna-opium (B&O SUPPOSITORY) 16 2-30 mg suppository 1 suppository  30 mg Rectal Q8H PRN    ceFAZolin (ANCEF) IVPB (premix in dextrose) 1,000 mg 50 mL  1,000 mg Intravenous Once    ceFAZolin (ANCEF) IVPB (premix in dextrose) 1,000 mg 50 mL  1,000 mg Intravenous Once    HYDROmorphone (DILAUDID) injection 0 5 mg  0 5 mg Intravenous Q4H PRN    HYDROmorphone (DILAUDID) tablet 2 mg  2 mg Oral Q4H PRN    HYDROmorphone (DILAUDID) tablet 4 mg  4 mg Oral Q4H PRN    insulin detemir (LEVEMIR) subcutaneous injection 22 Units  22 Units Subcutaneous HS    insulin lispro (HumaLOG) 100 units/mL subcutaneous injection 1-5 Units  1-5 Units Subcutaneous TID AC    insulin lispro (HumaLOG) 100 units/mL subcutaneous injection 1-5 Units  1-5 Units Subcutaneous HS    multi-electrolyte (PLASMALYTE-A/ISOLYTE-S PH 7 4) IV solution  75 mL/hr Intravenous Continuous    ondansetron (ZOFRAN) injection 4 mg  4 mg Intravenous Q6H PRN    oxybutynin (DITROPAN) tablet 5 mg  5 mg Oral TID PRN    pregabalin (LYRICA) capsule 50 mg  50 mg Oral TID    tacrolimus (PROGRAF) capsule 1 mg  1 mg Oral Q12H    tamsulosin (FLOMAX) capsule 0 4 mg  0 4 mg Oral Daily With Dinner    temazepam (RESTORIL) capsule 30 mg  30 mg Oral HS    zolpidem (AMBIEN) tablet 10 mg  10 mg Oral HS       VTE Pharmacologic Prophylaxis: Enoxaparin (Lovenox)  VTE Mechanical Prophylaxis: sequential compression device    Portions of the record may have been created with voice recognition software  Occasional wrong word or "sound a like" substitutions may have occurred due to the inherent limitations of voice recognition software    Read the chart carefully and recognize, using context, where substitutions have occurred   ==  Marco Antonio Arana, [employer]  Internal Medicine Residency PGY-3

## 2022-05-26 NOTE — ASSESSMENT & PLAN NOTE
Lab Results   Component Value Date    EGFR 17 05/26/2022    EGFR 18 05/25/2022    EGFR 20 05/23/2022    CREATININE 3 28 (H) 05/26/2022    CREATININE 3 08 (H) 05/25/2022    CREATININE 2 94 (H) 05/23/2022   · Follows with [facility]  · Previous baseline creatinine 1 7 to 2 2 but 2 8 to 2 9 since March  · Creatinine peaked at 3 24  · Increase in creatinine possibly due to right hydronephrosis   · S/p right PCN on 5/28  · Creatinine improved to 2 7 today  · [address] transplant for potential renal transplant in future  · Avoid nephrotoxins and hypotension   · Monitor BMP daily  · Nephrology consulted, appreciate recommendations

## 2022-05-26 NOTE — ASSESSMENT & PLAN NOTE
· History of liver transplant in 2003, maintained on Tacrolimus managed by [facility] transplant  · Continue home dose of Prograf 1 mg q 12 hours  · F/u Tacrolimus level

## 2022-05-26 NOTE — TELEPHONE ENCOUNTER
Patient had large bladder tumor resection  Will need follow-up within 2 weeks for discussion of pathology

## 2022-05-26 NOTE — ASSESSMENT & PLAN NOTE
· Persistence noted on CT on  5/23  · Right ureteral orifice could not be visualized during TURBT and unable to do ureteroscopy/stent  · Renal US (5/27) - moderate to severe right hydronephrosis     · S/p right PCN on 5/28

## 2022-05-26 NOTE — ASSESSMENT & PLAN NOTE
· History of prior CVA, currently without new focal deficits  · Plavix on hold given gross hematuria, resume when cleared with Urology

## 2022-05-26 NOTE — PLAN OF CARE
Problem: MOBILITY - ADULT  Goal: Maintain or return to baseline ADL function  Description: INTERVENTIONS:  -  Assess patient's ability to carry out ADLs; assess patient's baseline for ADL function and identify physical deficits which impact ability to perform ADLs (bathing, care of mouth/teeth, toileting, grooming, dressing, etc )  - Assess/evaluate cause of self-care deficits   - Assess range of motion  - Assess patient's mobility; develop plan if impaired  - Assess patient's need for assistive devices and provide as appropriate  - Encourage maximum independence but intervene and supervise when necessary  - Involve family in performance of ADLs  - Assess for home care needs following discharge   - Consider OT consult to assist with ADL evaluation and planning for discharge  - Provide patient education as appropriate  Outcome: Progressing  Goal: Maintains/Returns to pre admission functional level  Description: INTERVENTIONS:  - Perform BMAT or MOVE assessment daily    - Set and communicate daily mobility goal to care team and patient/family/caregiver  - Collaborate with rehabilitation services on mobility goals if consulted  - Perform Range of Motion *3** times a day  - Reposition patient every *2** hours    - Dangle patient **3* times a day  - Stand patient *3** times a day  - Ambulate patient *3** times a day  - Out of bed to chair **3* times a day   - Out of bed for meals *3** times a day  - Out of bed for toileting  - Record patient progress and toleration of activity level   Outcome: Progressing     Problem: CARDIOVASCULAR - ADULT  Goal: Maintains optimal cardiac output and hemodynamic stability  Description: INTERVENTIONS:  - Monitor I/O, vital signs and rhythm  - Monitor for S/S and trends of decreased cardiac output  - Administer and titrate ordered vasoactive medications to optimize hemodynamic stability  - Assess quality of pulses, skin color and temperature  - Assess for signs of decreased coronary artery perfusion  - Instruct patient to report change in severity of symptoms  Outcome: Progressing  Goal: Absence of cardiac dysrhythmias or at baseline rhythm  Description: INTERVENTIONS:  - Continuous cardiac monitoring, vital signs, obtain 12 lead EKG if ordered  - Administer antiarrhythmic and heart rate control medications as ordered  - Monitor electrolytes and administer replacement therapy as ordered  Outcome: Progressing     Problem: RESPIRATORY - ADULT  Goal: Achieves optimal ventilation and oxygenation  Description: INTERVENTIONS:  - Assess for changes in respiratory status  - Assess for changes in mentation and behavior  - Position to facilitate oxygenation and minimize respiratory effort  - Oxygen administered by appropriate delivery if ordered  - Initiate smoking cessation education as indicated  - Encourage broncho-pulmonary hygiene including cough, deep breathe, Incentive Spirometry  - Assess the need for suctioning and aspirate as needed  - Assess and instruct to report SOB or any respiratory difficulty  - Respiratory Therapy support as indicated  Outcome: Progressing     Problem: GENITOURINARY - ADULT  Goal: Maintains or returns to baseline urinary function  Description: INTERVENTIONS:  - Assess urinary function  - Encourage oral fluids to ensure adequate hydration if ordered  - Administer IV fluids as ordered to ensure adequate hydration  - Administer ordered medications as needed  - Offer frequent toileting  - Follow urinary retention protocol if ordered  Outcome: Progressing  Goal: Absence of urinary retention  Description: INTERVENTIONS:  - Assess patients ability to void and empty bladder  - Monitor I/O  - Bladder scan as needed  - Discuss with physician/AP medications to alleviate retention as needed  - Discuss catheterization for long term situations as appropriate  Outcome: Progressing  Goal: Urinary catheter remains patent  Description: INTERVENTIONS:  - Assess patency of urinary catheter  - If patient has a chronic flores, consider changing catheter if non-functioning  - Follow guidelines for intermittent irrigation of non-functioning urinary catheter  Outcome: Progressing     Problem: MUSCULOSKELETAL - ADULT  Goal: Maintain or return mobility to safest level of function  Description: INTERVENTIONS:  - Assess patient's ability to carry out ADLs; assess patient's baseline for ADL function and identify physical deficits which impact ability to perform ADLs (bathing, care of mouth/teeth, toileting, grooming, dressing, etc )  - Assess/evaluate cause of self-care deficits   - Assess range of motion  - Assess patient's mobility  - Assess patient's need for assistive devices and provide as appropriate  - Encourage maximum independence but intervene and supervise when necessary  - Involve family in performance of ADLs  - Assess for home care needs following discharge   - Consider OT consult to assist with ADL evaluation and planning for discharge  - Provide patient education as appropriate  Outcome: Progressing  Goal: Maintain proper alignment of affected body part  Description: INTERVENTIONS:  - Support, maintain and protect limb and body alignment  - Provide patient/ family with appropriate education  Outcome: Progressing     Problem: Potential for Falls  Goal: Patient will remain free of falls  Description: INTERVENTIONS:  - Educate patient/family on patient safety including physical limitations  - Instruct patient to call for assistance with activity   - Consult OT/PT to assist with strengthening/mobility   - Keep Call bell within reach  - Keep bed low and locked with side rails adjusted as appropriate  - Keep care items and personal belongings within reach  - Initiate and maintain comfort rounds  - Make Fall Risk Sign visible to staff  - Offer Toileting every **2* Hours, in advance of need  - Initiate/Maintain **3*alarm  - Apply yellow socks and bracelet for high fall risk patients  - Consider moving patient to room near nurses station  Outcome: Progressing     Problem: Knowledge Deficit  Goal: Patient/family/caregiver demonstrates understanding of disease process, treatment plan, medications, and discharge instructions  Description: Complete learning assessment and assess knowledge base  Interventions:  - Provide teaching at level of understanding  - Provide teaching via preferred learning methods  Outcome: Progressing     Problem: MOBILITY - ADULT  Goal: Maintain or return to baseline ADL function  Description: INTERVENTIONS:  -  Assess patient's ability to carry out ADLs; assess patient's baseline for ADL function and identify physical deficits which impact ability to perform ADLs (bathing, care of mouth/teeth, toileting, grooming, dressing, etc )  - Assess/evaluate cause of self-care deficits   - Assess range of motion  - Assess patient's mobility; develop plan if impaired  - Assess patient's need for assistive devices and provide as appropriate  - Encourage maximum independence but intervene and supervise when necessary  - Involve family in performance of ADLs  - Assess for home care needs following discharge   - Consider OT consult to assist with ADL evaluation and planning for discharge  - Provide patient education as appropriate  Outcome: Progressing  Goal: Maintains/Returns to pre admission functional level  Description: INTERVENTIONS:  - Perform BMAT or MOVE assessment daily    - Set and communicate daily mobility goal to care team and patient/family/caregiver  - Collaborate with rehabilitation services on mobility goals if consulted  - Perform Range of Motion *3** times a day  - Reposition patient every *2** hours    - Dangle patient *3** times a day  - Stand patient **3* times a day  - Ambulate patient **3* times a day  - Out of bed to chair **3* times a day   - Out of bed for meals *3** times a day  - Out of bed for toileting  - Record patient progress and toleration of activity level Outcome: Progressing     Problem: CARDIOVASCULAR - ADULT  Goal: Maintains optimal cardiac output and hemodynamic stability  Description: INTERVENTIONS:  - Monitor I/O, vital signs and rhythm  - Monitor for S/S and trends of decreased cardiac output  - Administer and titrate ordered vasoactive medications to optimize hemodynamic stability  - Assess quality of pulses, skin color and temperature  - Assess for signs of decreased coronary artery perfusion  - Instruct patient to report change in severity of symptoms  Outcome: Progressing  Goal: Absence of cardiac dysrhythmias or at baseline rhythm  Description: INTERVENTIONS:  - Continuous cardiac monitoring, vital signs, obtain 12 lead EKG if ordered  - Administer antiarrhythmic and heart rate control medications as ordered  - Monitor electrolytes and administer replacement therapy as ordered  Outcome: Progressing     Problem: RESPIRATORY - ADULT  Goal: Achieves optimal ventilation and oxygenation  Description: INTERVENTIONS:  - Assess for changes in respiratory status  - Assess for changes in mentation and behavior  - Position to facilitate oxygenation and minimize respiratory effort  - Oxygen administered by appropriate delivery if ordered  - Initiate smoking cessation education as indicated  - Encourage broncho-pulmonary hygiene including cough, deep breathe, Incentive Spirometry  - Assess the need for suctioning and aspirate as needed  - Assess and instruct to report SOB or any respiratory difficulty  - Respiratory Therapy support as indicated  Outcome: Progressing     Problem: GENITOURINARY - ADULT  Goal: Maintains or returns to baseline urinary function  Description: INTERVENTIONS:  - Assess urinary function  - Encourage oral fluids to ensure adequate hydration if ordered  - Administer IV fluids as ordered to ensure adequate hydration  - Administer ordered medications as needed  - Offer frequent toileting  - Follow urinary retention protocol if ordered  Outcome: Progressing  Goal: Absence of urinary retention  Description: INTERVENTIONS:  - Assess patients ability to void and empty bladder  - Monitor I/O  - Bladder scan as needed  - Discuss with physician/AP medications to alleviate retention as needed  - Discuss catheterization for long term situations as appropriate  Outcome: Progressing  Goal: Urinary catheter remains patent  Description: INTERVENTIONS:  - Assess patency of urinary catheter  - If patient has a chronic flores, consider changing catheter if non-functioning  - Follow guidelines for intermittent irrigation of non-functioning urinary catheter  Outcome: Progressing     Problem: MUSCULOSKELETAL - ADULT  Goal: Maintain or return mobility to safest level of function  Description: INTERVENTIONS:  - Assess patient's ability to carry out ADLs; assess patient's baseline for ADL function and identify physical deficits which impact ability to perform ADLs (bathing, care of mouth/teeth, toileting, grooming, dressing, etc )  - Assess/evaluate cause of self-care deficits   - Assess range of motion  - Assess patient's mobility  - Assess patient's need for assistive devices and provide as appropriate  - Encourage maximum independence but intervene and supervise when necessary  - Involve family in performance of ADLs  - Assess for home care needs following discharge   - Consider OT consult to assist with ADL evaluation and planning for discharge  - Provide patient education as appropriate  Outcome: Progressing  Goal: Maintain proper alignment of affected body part  Description: INTERVENTIONS:  - Support, maintain and protect limb and body alignment  - Provide patient/ family with appropriate education  Outcome: Progressing     Problem: Potential for Falls  Goal: Patient will remain free of falls  Description: INTERVENTIONS:  - Educate patient/family on patient safety including physical limitations  - Instruct patient to call for assistance with activity   - Consult OT/PT to assist with strengthening/mobility   - Keep Call bell within reach  - Keep bed low and locked with side rails adjusted as appropriate  - Keep care items and personal belongings within reach  - Initiate and maintain comfort rounds  - Make Fall Risk Sign visible to staff  - Offer Toileting every **2* Hours, in advance of need  - Initiate/Maintain *BED**alarm  - Apply yellow socks and bracelet for high fall risk patients  - Consider moving patient to room near nurses station  Outcome: Progressing     Problem: Knowledge Deficit  Goal: Patient/family/caregiver demonstrates understanding of disease process, treatment plan, medications, and discharge instructions  Description: Complete learning assessment and assess knowledge base  Interventions:  - Provide teaching at level of understanding  - Provide teaching via preferred learning methods  Outcome: Progressing     Problem: MOBILITY - ADULT  Goal: Maintain or return to baseline ADL function  Description: INTERVENTIONS:  -  Assess patient's ability to carry out ADLs; assess patient's baseline for ADL function and identify physical deficits which impact ability to perform ADLs (bathing, care of mouth/teeth, toileting, grooming, dressing, etc )  - Assess/evaluate cause of self-care deficits   - Assess range of motion  - Assess patient's mobility; develop plan if impaired  - Assess patient's need for assistive devices and provide as appropriate  - Encourage maximum independence but intervene and supervise when necessary  - Involve family in performance of ADLs  - Assess for home care needs following discharge   - Consider OT consult to assist with ADL evaluation and planning for discharge  - Provide patient education as appropriate  Outcome: Progressing  Goal: Maintains/Returns to pre admission functional level  Description: INTERVENTIONS:  - Perform BMAT or MOVE assessment daily    - Set and communicate daily mobility goal to care team and patient/family/caregiver     - Collaborate with rehabilitation services on mobility goals if consulted  - Out of bed for toileting  - Record patient progress and toleration of activity level   Outcome: Progressing     Problem: CARDIOVASCULAR - ADULT  Goal: Maintains optimal cardiac output and hemodynamic stability  Description: INTERVENTIONS:  - Monitor I/O, vital signs and rhythm  - Monitor for S/S and trends of decreased cardiac output  - Administer and titrate ordered vasoactive medications to optimize hemodynamic stability  - Assess quality of pulses, skin color and temperature  - Assess for signs of decreased coronary artery perfusion  - Instruct patient to report change in severity of symptoms  Outcome: Progressing  Goal: Absence of cardiac dysrhythmias or at baseline rhythm  Description: INTERVENTIONS:  - Continuous cardiac monitoring, vital signs, obtain 12 lead EKG if ordered  - Administer antiarrhythmic and heart rate control medications as ordered  - Monitor electrolytes and administer replacement therapy as ordered  Outcome: Progressing     Problem: RESPIRATORY - ADULT  Goal: Achieves optimal ventilation and oxygenation  Description: INTERVENTIONS:  - Assess for changes in respiratory status  - Assess for changes in mentation and behavior  - Position to facilitate oxygenation and minimize respiratory effort  - Oxygen administered by appropriate delivery if ordered  - Initiate smoking cessation education as indicated  - Encourage broncho-pulmonary hygiene including cough, deep breathe, Incentive Spirometry  - Assess the need for suctioning and aspirate as needed  - Assess and instruct to report SOB or any respiratory difficulty  - Respiratory Therapy support as indicated  Outcome: Progressing     Problem: GENITOURINARY - ADULT  Goal: Maintains or returns to baseline urinary function  Description: INTERVENTIONS:  - Assess urinary function  - Encourage oral fluids to ensure adequate hydration if ordered  - Administer IV fluids as ordered to ensure adequate hydration  - Administer ordered medications as needed  - Offer frequent toileting  - Follow urinary retention protocol if ordered  Outcome: Progressing  Goal: Absence of urinary retention  Description: INTERVENTIONS:  - Assess patients ability to void and empty bladder  - Monitor I/O  - Bladder scan as needed  - Discuss with physician/AP medications to alleviate retention as needed  - Discuss catheterization for long term situations as appropriate  Outcome: Progressing  Goal: Urinary catheter remains patent  Description: INTERVENTIONS:  - Assess patency of urinary catheter  - If patient has a chronic flores, consider changing catheter if non-functioning  - Follow guidelines for intermittent irrigation of non-functioning urinary catheter  Outcome: Progressing     Problem: MUSCULOSKELETAL - ADULT  Goal: Maintain or return mobility to safest level of function  Description: INTERVENTIONS:  - Assess patient's ability to carry out ADLs; assess patient's baseline for ADL function and identify physical deficits which impact ability to perform ADLs (bathing, care of mouth/teeth, toileting, grooming, dressing, etc )  - Assess/evaluate cause of self-care deficits   - Assess range of motion  - Assess patient's mobility  - Assess patient's need for assistive devices and provide as appropriate  - Encourage maximum independence but intervene and supervise when necessary  - Involve family in performance of ADLs  - Assess for home care needs following discharge   - Consider OT consult to assist with ADL evaluation and planning for discharge  - Provide patient education as appropriate  Outcome: Progressing  Goal: Maintain proper alignment of affected body part  Description: INTERVENTIONS:  - Support, maintain and protect limb and body alignment  - Provide patient/ family with appropriate education  Outcome: Progressing     Problem: Potential for Falls  Goal: Patient will remain free of falls  Description: INTERVENTIONS:  - Educate patient/family on patient safety including physical limitations  - Instruct patient to call for assistance with activity   - Consult OT/PT to assist with strengthening/mobility   - Keep Call bell within reach  - Keep bed low and locked with side rails adjusted as appropriate  - Keep care items and personal belongings within reach  - Initiate and maintain comfort rounds  - Make Fall Risk Sign visible to staff  - Apply yellow socks and bracelet for high fall risk patients  - Consider moving patient to room near nurses station  Outcome: Progressing     Problem: Knowledge Deficit  Goal: Patient/family/caregiver demonstrates understanding of disease process, treatment plan, medications, and discharge instructions  Description: Complete learning assessment and assess knowledge base    Interventions:  - Provide teaching at level of understanding  - Provide teaching via preferred learning methods  Outcome: Progressing     Problem: MOBILITY - ADULT  Goal: Maintain or return to baseline ADL function  Description: INTERVENTIONS:  -  Assess patient's ability to carry out ADLs; assess patient's baseline for ADL function and identify physical deficits which impact ability to perform ADLs (bathing, care of mouth/teeth, toileting, grooming, dressing, etc )  - Assess/evaluate cause of self-care deficits   - Assess range of motion  - Assess patient's mobility; develop plan if impaired  - Assess patient's need for assistive devices and provide as appropriate  - Encourage maximum independence but intervene and supervise when necessary  - Involve family in performance of ADLs  - Assess for home care needs following discharge   - Consider OT consult to assist with ADL evaluation and planning for discharge  - Provide patient education as appropriate  Outcome: Progressing  Goal: Maintains/Returns to pre admission functional level  Description: INTERVENTIONS:  - Perform BMAT or MOVE assessment daily    - Set and communicate daily mobility goal to care team and patient/family/caregiver     - Collaborate with rehabilitation services on mobility goals if consulted  - Out of bed for toileting  - Record patient progress and toleration of activity level   Outcome: Progressing     Problem: CARDIOVASCULAR - ADULT  Goal: Maintains optimal cardiac output and hemodynamic stability  Description: INTERVENTIONS:  - Monitor I/O, vital signs and rhythm  - Monitor for S/S and trends of decreased cardiac output  - Administer and titrate ordered vasoactive medications to optimize hemodynamic stability  - Assess quality of pulses, skin color and temperature  - Assess for signs of decreased coronary artery perfusion  - Instruct patient to report change in severity of symptoms  Outcome: Progressing  Goal: Absence of cardiac dysrhythmias or at baseline rhythm  Description: INTERVENTIONS:  - Continuous cardiac monitoring, vital signs, obtain 12 lead EKG if ordered  - Administer antiarrhythmic and heart rate control medications as ordered  - Monitor electrolytes and administer replacement therapy as ordered  Outcome: Progressing     Problem: RESPIRATORY - ADULT  Goal: Achieves optimal ventilation and oxygenation  Description: INTERVENTIONS:  - Assess for changes in respiratory status  - Assess for changes in mentation and behavior  - Position to facilitate oxygenation and minimize respiratory effort  - Oxygen administered by appropriate delivery if ordered  - Initiate smoking cessation education as indicated  - Encourage broncho-pulmonary hygiene including cough, deep breathe, Incentive Spirometry  - Assess the need for suctioning and aspirate as needed  - Assess and instruct to report SOB or any respiratory difficulty  - Respiratory Therapy support as indicated  Outcome: Progressing     Problem: GENITOURINARY - ADULT  Goal: Maintains or returns to baseline urinary function  Description: INTERVENTIONS:  - Assess urinary function  - Encourage oral fluids to ensure adequate hydration if ordered  - Administer IV fluids as ordered to ensure adequate hydration  - Administer ordered medications as needed  - Offer frequent toileting  - Follow urinary retention protocol if ordered  Outcome: Progressing  Goal: Absence of urinary retention  Description: INTERVENTIONS:  - Assess patients ability to void and empty bladder  - Monitor I/O  - Bladder scan as needed  - Discuss with physician/AP medications to alleviate retention as needed  - Discuss catheterization for long term situations as appropriate  Outcome: Progressing  Goal: Urinary catheter remains patent  Description: INTERVENTIONS:  - Assess patency of urinary catheter  - If patient has a chronic flores, consider changing catheter if non-functioning  - Follow guidelines for intermittent irrigation of non-functioning urinary catheter  Outcome: Progressing     Problem: MUSCULOSKELETAL - ADULT  Goal: Maintain or return mobility to safest level of function  Description: INTERVENTIONS:  - Assess patient's ability to carry out ADLs; assess patient's baseline for ADL function and identify physical deficits which impact ability to perform ADLs (bathing, care of mouth/teeth, toileting, grooming, dressing, etc )  - Assess/evaluate cause of self-care deficits   - Assess range of motion  - Assess patient's mobility  - Assess patient's need for assistive devices and provide as appropriate  - Encourage maximum independence but intervene and supervise when necessary  - Involve family in performance of ADLs  - Assess for home care needs following discharge   - Consider OT consult to assist with ADL evaluation and planning for discharge  - Provide patient education as appropriate  Outcome: Progressing  Goal: Maintain proper alignment of affected body part  Description: INTERVENTIONS:  - Support, maintain and protect limb and body alignment  - Provide patient/ family with appropriate education  Outcome: Progressing     Problem: Potential for Falls  Goal: Patient will remain free of falls  Description: INTERVENTIONS:  - Educate patient/family on patient safety including physical limitations  - Instruct patient to call for assistance with activity   - Consult OT/PT to assist with strengthening/mobility   - Keep Call bell within reach  - Keep bed low and locked with side rails adjusted as appropriate  - Keep care items and personal belongings within reach  - Initiate and maintain comfort rounds  - Make Fall Risk Sign visible to staff  - Offer Toileting every *2** Hours, in advance of need  - Initiate/Maintain **BED*alarm  - Apply yellow socks and bracelet for high fall risk patients  - Consider moving patient to room near nurses station  Outcome: Progressing     Problem: Knowledge Deficit  Goal: Patient/family/caregiver demonstrates understanding of disease process, treatment plan, medications, and discharge instructions  Description: Complete learning assessment and assess knowledge base    Interventions:  - Provide teaching at level of understanding  - Provide teaching via preferred learning methods  Outcome: Progressing

## 2022-05-26 NOTE — ANESTHESIA POSTPROCEDURE EVALUATION
Post-Op Assessment Note    CV Status:  Stable  Pain Score: 0    Pain management: adequate     Mental Status:  Alert and awake   Hydration Status:  Euvolemic   PONV Controlled:  Controlled   Airway Patency:  Patent      Post Op Vitals Reviewed: Yes      Staff: CRNA, Anesthesiologist         No complications documented      BP   140/88   Temp   98 2   Pulse 91   Resp   14   SpO2   100

## 2022-05-27 ENCOUNTER — APPOINTMENT (INPATIENT)
Dept: RADIOLOGY | Facility: HOSPITAL | Age: 72
DRG: 669 | End: 2022-05-27
Payer: COMMERCIAL

## 2022-05-27 ENCOUNTER — TELEPHONE (OUTPATIENT)
Dept: RADIOLOGY | Facility: HOSPITAL | Age: 72
End: 2022-05-27

## 2022-05-27 PROBLEM — R50.9 FEVER: Status: ACTIVE | Noted: 2022-05-27

## 2022-05-27 PROBLEM — D64.9 ANEMIA: Status: ACTIVE | Noted: 2022-05-27

## 2022-05-27 LAB
ABO GROUP BLD: NORMAL
ALBUMIN SERPL BCP-MCNC: 1.9 G/DL (ref 3.5–5)
ALP SERPL-CCNC: 57 U/L (ref 46–116)
ALT SERPL W P-5'-P-CCNC: 9 U/L (ref 12–78)
ANION GAP SERPL CALCULATED.3IONS-SCNC: 6 MMOL/L (ref 4–13)
AST SERPL W P-5'-P-CCNC: 18 U/L (ref 5–45)
BACTERIA UR QL AUTO: ABNORMAL /HPF
BASOPHILS # BLD AUTO: 0.01 THOUSANDS/ΜL (ref 0–0.1)
BASOPHILS # BLD AUTO: 0.02 THOUSANDS/ΜL (ref 0–0.1)
BASOPHILS NFR BLD AUTO: 0 % (ref 0–1)
BASOPHILS NFR BLD AUTO: 0 % (ref 0–1)
BILIRUB SERPL-MCNC: 0.17 MG/DL (ref 0.2–1)
BILIRUB UR QL STRIP: NEGATIVE
BLD GP AB SCN SERPL QL: NEGATIVE
BUN SERPL-MCNC: 40 MG/DL (ref 5–25)
CALCIUM ALBUM COR SERPL-MCNC: 9.7 MG/DL (ref 8.3–10.1)
CALCIUM SERPL-MCNC: 8 MG/DL (ref 8.3–10.1)
CHLORIDE SERPL-SCNC: 108 MMOL/L (ref 100–108)
CLARITY UR: ABNORMAL
CO2 SERPL-SCNC: 25 MMOL/L (ref 21–32)
COLOR UR: ABNORMAL
CREAT SERPL-MCNC: 2.73 MG/DL (ref 0.6–1.3)
EOSINOPHIL # BLD AUTO: 0.42 THOUSAND/ΜL (ref 0–0.61)
EOSINOPHIL # BLD AUTO: 0.49 THOUSAND/ΜL (ref 0–0.61)
EOSINOPHIL NFR BLD AUTO: 5 % (ref 0–6)
EOSINOPHIL NFR BLD AUTO: 6 % (ref 0–6)
ERYTHROCYTE [DISTWIDTH] IN BLOOD BY AUTOMATED COUNT: 15.1 % (ref 11.6–15.1)
ERYTHROCYTE [DISTWIDTH] IN BLOOD BY AUTOMATED COUNT: 15.2 % (ref 11.6–15.1)
GFR SERPL CREATININE-BSD FRML MDRD: 21 ML/MIN/1.73SQ M
GLUCOSE SERPL-MCNC: 114 MG/DL (ref 65–140)
GLUCOSE SERPL-MCNC: 163 MG/DL (ref 65–140)
GLUCOSE SERPL-MCNC: 165 MG/DL (ref 65–140)
GLUCOSE SERPL-MCNC: 176 MG/DL (ref 65–140)
GLUCOSE SERPL-MCNC: 188 MG/DL (ref 65–140)
GLUCOSE SERPL-MCNC: 42 MG/DL (ref 65–140)
GLUCOSE SERPL-MCNC: 43 MG/DL (ref 65–140)
GLUCOSE UR STRIP-MCNC: NEGATIVE MG/DL
HCT VFR BLD AUTO: 23.2 % (ref 36.5–49.3)
HCT VFR BLD AUTO: 23.8 % (ref 36.5–49.3)
HCT VFR BLD AUTO: 25.2 % (ref 36.5–49.3)
HGB BLD-MCNC: 7.1 G/DL (ref 12–17)
HGB BLD-MCNC: 7.3 G/DL (ref 12–17)
HGB BLD-MCNC: 8 G/DL (ref 12–17)
HGB UR QL STRIP.AUTO: ABNORMAL
IMM GRANULOCYTES # BLD AUTO: 0.03 THOUSAND/UL (ref 0–0.2)
IMM GRANULOCYTES # BLD AUTO: 0.03 THOUSAND/UL (ref 0–0.2)
IMM GRANULOCYTES NFR BLD AUTO: 0 % (ref 0–2)
IMM GRANULOCYTES NFR BLD AUTO: 0 % (ref 0–2)
KETONES UR STRIP-MCNC: NEGATIVE MG/DL
LACTATE SERPL-SCNC: 0.5 MMOL/L (ref 0.5–2)
LEUKOCYTE ESTERASE UR QL STRIP: ABNORMAL
LYMPHOCYTES # BLD AUTO: 1.55 THOUSANDS/ΜL (ref 0.6–4.47)
LYMPHOCYTES # BLD AUTO: 1.57 THOUSANDS/ΜL (ref 0.6–4.47)
LYMPHOCYTES NFR BLD AUTO: 18 % (ref 14–44)
LYMPHOCYTES NFR BLD AUTO: 20 % (ref 14–44)
MCH RBC QN AUTO: 23.5 PG (ref 26.8–34.3)
MCH RBC QN AUTO: 23.8 PG (ref 26.8–34.3)
MCHC RBC AUTO-ENTMCNC: 30 G/DL (ref 31.4–37.4)
MCHC RBC AUTO-ENTMCNC: 30.7 G/DL (ref 31.4–37.4)
MCV RBC AUTO: 78 FL (ref 82–98)
MCV RBC AUTO: 79 FL (ref 82–98)
MONOCYTES # BLD AUTO: 0.62 THOUSAND/ΜL (ref 0.17–1.22)
MONOCYTES # BLD AUTO: 0.64 THOUSAND/ΜL (ref 0.17–1.22)
MONOCYTES NFR BLD AUTO: 7 % (ref 4–12)
MONOCYTES NFR BLD AUTO: 8 % (ref 4–12)
NEUTROPHILS # BLD AUTO: 5.05 THOUSANDS/ΜL (ref 1.85–7.62)
NEUTROPHILS # BLD AUTO: 6.08 THOUSANDS/ΜL (ref 1.85–7.62)
NEUTS SEG NFR BLD AUTO: 67 % (ref 43–75)
NEUTS SEG NFR BLD AUTO: 69 % (ref 43–75)
NITRITE UR QL STRIP: NEGATIVE
NON-SQ EPI CELLS URNS QL MICRO: ABNORMAL /HPF
NRBC BLD AUTO-RTO: 0 /100 WBCS
NRBC BLD AUTO-RTO: 0 /100 WBCS
PH UR STRIP.AUTO: 7.5 [PH]
PLATELET # BLD AUTO: 80 THOUSANDS/UL (ref 149–390)
PLATELET # BLD AUTO: 93 THOUSANDS/UL (ref 149–390)
PMV BLD AUTO: 12.5 FL (ref 8.9–12.7)
PMV BLD AUTO: 13.2 FL (ref 8.9–12.7)
POTASSIUM SERPL-SCNC: 4.3 MMOL/L (ref 3.5–5.3)
PROCALCITONIN SERPL-MCNC: 0.46 NG/ML
PROT SERPL-MCNC: 5.5 G/DL (ref 6.4–8.2)
PROT UR STRIP-MCNC: ABNORMAL MG/DL
RBC # BLD AUTO: 2.93 MILLION/UL (ref 3.88–5.62)
RBC # BLD AUTO: 3.07 MILLION/UL (ref 3.88–5.62)
RBC #/AREA URNS AUTO: ABNORMAL /HPF
RH BLD: POSITIVE
SODIUM SERPL-SCNC: 139 MMOL/L (ref 136–145)
SP GR UR STRIP.AUTO: 1.01 (ref 1–1.03)
SPECIMEN EXPIRATION DATE: NORMAL
TACROLIMUS BLD-MCNC: 1.9 NG/ML (ref 2–20)
UROBILINOGEN UR STRIP-ACNC: <2 MG/DL
WBC # BLD AUTO: 7.73 THOUSAND/UL (ref 4.31–10.16)
WBC # BLD AUTO: 8.78 THOUSAND/UL (ref 4.31–10.16)
WBC #/AREA URNS AUTO: ABNORMAL /HPF

## 2022-05-27 PROCEDURE — 85018 HEMOGLOBIN: CPT | Performed by: STUDENT IN AN ORGANIZED HEALTH CARE EDUCATION/TRAINING PROGRAM

## 2022-05-27 PROCEDURE — 71045 X-RAY EXAM CHEST 1 VIEW: CPT

## 2022-05-27 PROCEDURE — 85025 COMPLETE CBC W/AUTO DIFF WBC: CPT | Performed by: STUDENT IN AN ORGANIZED HEALTH CARE EDUCATION/TRAINING PROGRAM

## 2022-05-27 PROCEDURE — 86923 COMPATIBILITY TEST ELECTRIC: CPT

## 2022-05-27 PROCEDURE — 86901 BLOOD TYPING SEROLOGIC RH(D): CPT | Performed by: STUDENT IN AN ORGANIZED HEALTH CARE EDUCATION/TRAINING PROGRAM

## 2022-05-27 PROCEDURE — 85014 HEMATOCRIT: CPT | Performed by: STUDENT IN AN ORGANIZED HEALTH CARE EDUCATION/TRAINING PROGRAM

## 2022-05-27 PROCEDURE — 99232 SBSQ HOSP IP/OBS MODERATE 35: CPT | Performed by: INTERNAL MEDICINE

## 2022-05-27 PROCEDURE — 84154 ASSAY OF PSA FREE: CPT | Performed by: STUDENT IN AN ORGANIZED HEALTH CARE EDUCATION/TRAINING PROGRAM

## 2022-05-27 PROCEDURE — 85025 COMPLETE CBC W/AUTO DIFF WBC: CPT | Performed by: PHYSICIAN ASSISTANT

## 2022-05-27 PROCEDURE — 87040 BLOOD CULTURE FOR BACTERIA: CPT | Performed by: STUDENT IN AN ORGANIZED HEALTH CARE EDUCATION/TRAINING PROGRAM

## 2022-05-27 PROCEDURE — 82948 REAGENT STRIP/BLOOD GLUCOSE: CPT

## 2022-05-27 PROCEDURE — 83605 ASSAY OF LACTIC ACID: CPT | Performed by: PHYSICIAN ASSISTANT

## 2022-05-27 PROCEDURE — 80197 ASSAY OF TACROLIMUS: CPT | Performed by: INTERNAL MEDICINE

## 2022-05-27 PROCEDURE — 84153 ASSAY OF PSA TOTAL: CPT | Performed by: STUDENT IN AN ORGANIZED HEALTH CARE EDUCATION/TRAINING PROGRAM

## 2022-05-27 PROCEDURE — P9040 RBC LEUKOREDUCED IRRADIATED: HCPCS

## 2022-05-27 PROCEDURE — 81001 URINALYSIS AUTO W/SCOPE: CPT | Performed by: STUDENT IN AN ORGANIZED HEALTH CARE EDUCATION/TRAINING PROGRAM

## 2022-05-27 PROCEDURE — 30233N1 TRANSFUSION OF NONAUTOLOGOUS RED BLOOD CELLS INTO PERIPHERAL VEIN, PERCUTANEOUS APPROACH: ICD-10-PCS | Performed by: STUDENT IN AN ORGANIZED HEALTH CARE EDUCATION/TRAINING PROGRAM

## 2022-05-27 PROCEDURE — 99223 1ST HOSP IP/OBS HIGH 75: CPT | Performed by: INTERNAL MEDICINE

## 2022-05-27 PROCEDURE — 76770 US EXAM ABDO BACK WALL COMP: CPT

## 2022-05-27 PROCEDURE — 99232 SBSQ HOSP IP/OBS MODERATE 35: CPT | Performed by: UROLOGY

## 2022-05-27 PROCEDURE — NC001 PR NO CHARGE: Performed by: PHYSICIAN ASSISTANT

## 2022-05-27 PROCEDURE — 84145 PROCALCITONIN (PCT): CPT | Performed by: PHYSICIAN ASSISTANT

## 2022-05-27 PROCEDURE — 86900 BLOOD TYPING SEROLOGIC ABO: CPT | Performed by: STUDENT IN AN ORGANIZED HEALTH CARE EDUCATION/TRAINING PROGRAM

## 2022-05-27 PROCEDURE — 80053 COMPREHEN METABOLIC PANEL: CPT | Performed by: STUDENT IN AN ORGANIZED HEALTH CARE EDUCATION/TRAINING PROGRAM

## 2022-05-27 PROCEDURE — 86850 RBC ANTIBODY SCREEN: CPT | Performed by: STUDENT IN AN ORGANIZED HEALTH CARE EDUCATION/TRAINING PROGRAM

## 2022-05-27 RX ADMIN — CEFEPIME HYDROCHLORIDE 1000 MG: 1 INJECTION, POWDER, FOR SOLUTION INTRAMUSCULAR; INTRAVENOUS at 22:13

## 2022-05-27 RX ADMIN — HYDROMORPHONE HYDROCHLORIDE 0.5 MG: 1 INJECTION, SOLUTION INTRAMUSCULAR; INTRAVENOUS; SUBCUTANEOUS at 22:19

## 2022-05-27 RX ADMIN — ACETAMINOPHEN 650 MG: 325 TABLET, FILM COATED ORAL at 01:17

## 2022-05-27 RX ADMIN — HYDROMORPHONE HYDROCHLORIDE 0.5 MG: 1 INJECTION, SOLUTION INTRAMUSCULAR; INTRAVENOUS; SUBCUTANEOUS at 13:11

## 2022-05-27 RX ADMIN — INSULIN LISPRO 1 UNITS: 100 INJECTION, SOLUTION INTRAVENOUS; SUBCUTANEOUS at 16:08

## 2022-05-27 RX ADMIN — PREGABALIN 50 MG: 50 CAPSULE ORAL at 10:24

## 2022-05-27 RX ADMIN — TACROLIMUS 1 MG: 1 CAPSULE ORAL at 10:24

## 2022-05-27 RX ADMIN — HYDROMORPHONE HYDROCHLORIDE 4 MG: 4 TABLET ORAL at 20:59

## 2022-05-27 RX ADMIN — TEMAZEPAM 30 MG: 15 CAPSULE ORAL at 21:15

## 2022-05-27 RX ADMIN — INSULIN LISPRO 1 UNITS: 100 INJECTION, SOLUTION INTRAVENOUS; SUBCUTANEOUS at 12:59

## 2022-05-27 RX ADMIN — TACROLIMUS 1 MG: 1 CAPSULE ORAL at 21:15

## 2022-05-27 RX ADMIN — HYDROMORPHONE HYDROCHLORIDE 4 MG: 4 TABLET ORAL at 10:25

## 2022-05-27 RX ADMIN — PREGABALIN 50 MG: 50 CAPSULE ORAL at 21:15

## 2022-05-27 RX ADMIN — TAMSULOSIN HYDROCHLORIDE 0.4 MG: 0.4 CAPSULE ORAL at 16:08

## 2022-05-27 RX ADMIN — INSULIN DETEMIR 6 UNITS: 100 INJECTION, SOLUTION SUBCUTANEOUS at 21:16

## 2022-05-27 RX ADMIN — SODIUM CHLORIDE, SODIUM LACTATE, POTASSIUM CHLORIDE, AND CALCIUM CHLORIDE 1000 ML: .6; .31; .03; .02 INJECTION, SOLUTION INTRAVENOUS at 03:51

## 2022-05-27 RX ADMIN — OXYBUTYNIN CHLORIDE 5 MG: 5 TABLET ORAL at 21:16

## 2022-05-27 RX ADMIN — PREGABALIN 50 MG: 50 CAPSULE ORAL at 16:07

## 2022-05-27 RX ADMIN — CEFEPIME HYDROCHLORIDE 1000 MG: 1 INJECTION, POWDER, FOR SOLUTION INTRAMUSCULAR; INTRAVENOUS at 10:25

## 2022-05-27 RX ADMIN — SODIUM CHLORIDE, SODIUM GLUCONATE, SODIUM ACETATE, POTASSIUM CHLORIDE, MAGNESIUM CHLORIDE, SODIUM PHOSPHATE, DIBASIC, AND POTASSIUM PHOSPHATE 75 ML/HR: .53; .5; .37; .037; .03; .012; .00082 INJECTION, SOLUTION INTRAVENOUS at 07:40

## 2022-05-27 RX ADMIN — INSULIN LISPRO 1 UNITS: 100 INJECTION, SOLUTION INTRAVENOUS; SUBCUTANEOUS at 21:15

## 2022-05-27 RX ADMIN — ZOLPIDEM TARTRATE 10 MG: 5 TABLET ORAL at 21:15

## 2022-05-27 RX ADMIN — HYDROMORPHONE HYDROCHLORIDE 4 MG: 4 TABLET ORAL at 16:09

## 2022-05-27 NOTE — ASSESSMENT & PLAN NOTE
· Febrile, hx liver transplant, cystoscopy 05/26/2022, large bladder mass resection/biopsy, right-sided hydronephrosis   · Urinalysis/UC unremarkable on admission, WBC stable, elevated procalcitonin  · Etiology likely post surgical procedure translocation vs right-sided hydronephrosis  · Chest x-ray imaging unremarkable for consolidation/effusion, UA positive WBC, elevated procalcitonin  · Continue cefepime, dosing per renal adjustment, follow blood culture data  · Infectious disease consult, appreciate recommendations  · S/p right PCN on 5/28

## 2022-05-27 NOTE — ASSESSMENT & PLAN NOTE
Hemoglobin trending down postprocedure  Transfuse 1 unit PRBCs 05/27/2022, leukoreduced secondary to history liver transplant  Daily H/H stable, gross hematuria improvement, hold AC/AP given procedure

## 2022-05-27 NOTE — PROGRESS NOTES
INTERNAL MEDICINE RESIDENCY PROGRESS NOTE     Name: Mariangel Sarmiento   Age & Sex: 74 y o  male   MRN: [id number]  Unit/Bed#: -01   Encounter: [id number]  Team: SLIM    PATIENT INFORMATION     Name: Mariangel Sarmiento   Age & Sex: 74 y o  male   MRN: [id number]  Hospital Stay Days: 4    ASSESSMENT/PLAN     Principal Problem:     Mass of bladder  Active Problems:    History of CVA (cerebrovascular accident)    [facility]    Stage 4 chronic kidney disease (HCC)    Type 2 diabetes mellitus with diabetic peripheral angiopathy without gangrene, with long-term current use of insulin (HCC)    Gross hematuria    Hydronephrosis of right kidney    Problem with vascular access    Anemia    Fever      Fever  Assessment & Plan  Febrile overnight, hx liver transplant, cystoscopy 05/26/2022, large bladder mass resection/biopsy, right-sided hydronephrosis still present  Urinalysis/UC unremarkable on admission, WBC stable, elevated procalcitonin  Etiology likely post surgical procedure translocation vs right-sided hydronephrosis  Septic workup with blood cultures x2, chest x-ray, UA  Start cefepime, dosing per renal adjustment  Infectious disease consult, appreciate recommendations given medical comorbidities    Anemia  Assessment & Plan  Hemoglobin trending down postprocedure  Concern for symptomatic anemia, hemoglobin 7 1  Transfuse 1 unit PRBCs, consent obtained patient/family, leuko reduced secondary to history liver transplant  Daily H/H, gross hematuria improvement, hold AC/AP    Problem with vascular access  Assessment & Plan  · Status post R brachial PICC placement on 5/24    Hydronephrosis of right kidney  Assessment & Plan  CT AP: Persistent moderate right-sided hydroureteronephrosis without a discrete ureteral calculus  Min Salon is however a large bladder mass now seen which may reflect hematoma and/or neoplasm  Min Salon is possible soft tissue density extending into the right ureterovesical junction     · Urine culture with no growth  · 3-way Webb catheter placed on 5/23   · Continue with Oxybutynin and Flomax  · Cystoscopy completed 05/26/2022 unable to stent hydronephrosis secondary to mass  · Nephrology consulted, repeat kidney/bladder ultrasound pending  · Analgesics plan as needed    Gross hematuria  Assessment & Plan  Gross hematuria as noted by patient with clots  · UA with trace leukocytes and urine culture negative  · CT AP with right sided hydro and bladder mass concern for hematoma and/or neoplasm  · S/p Webb catheter insertion 5/23, no hematuria on exam today  · Urology following; Cystoscopy 05/26/22 shows 5 cm mass, unable to stent right-sided hydronephrosis with mass blocking right ureteral orifice  Await mass biopsies for definitive management    · Trend serial hemoglobins  · Hold AP/AC    Type 2 diabetes mellitus with diabetic peripheral angiopathy without gangrene, with long-term current use of insulin [facility])  Assessment & Plan  Lab Results   Component Value Date    HGBA1C 7 2 (H) 02/10/2022       Recent Labs     05/25/22  2113 05/26/22  0611 05/26/22  0624 05/26/22  0849   POCGLU 172* 45* 294* 92       Blood Sugar Average: Last 72 hrs:  · Decrease Levemir 15 units q h s , blood glucose goal 140-180 while inpatient  · Correctional insulin  · Carb controlled diet  · Initiate hypoglycemia protocol  · Continue home dose Lyrica for neuropathy    Stage 4 chronic kidney disease [facility])  Assessment & Plan  Lab Results   Component Value Date    EGFR 17 05/26/2022    EGFR 18 05/25/2022    EGFR 20 05/23/2022    CREATININE 3 28 (H) 05/26/2022    CREATININE 3 08 (H) 05/25/2022    CREATININE 2 94 (H) 05/23/2022   · Follows with [facility]  · Creatinine currently at baseline of approximately 2 9  · Was referred back to [facility] transplant for potential renal transplant in future  · Avoid nephrotoxins and hypotension   · Monitor BMP daily  · Nephrology consulted, appreciate recommendations  · Continue IV fluids, kidney/bladder ultrasound to monitor right-sided hydronephrosis    Liver transplant status ([facility] [address] [facility] )  Assessment & Plan  · History of liver transplant in , maintained on Tacrolimus managed by [facility] transplant  · Continue home dose of Prograf 1 mg q 12 hours  · Tacrolimus level pending    History of CVA (cerebrovascular accident)  Assessment & Plan  · History of prior CVA, currently without new focal deficits  · Plavix on hold given gross hematuria, resume when cleared with Urology     * Mass of bladder  Assessment & Plan  CT abdomen/pelvis with contrast; large bladder mass which measures approximately 7 4 x 6 6 cm  Possible soft tissue mass extending into the right ureterovesical junction  2022 Cystoscopy with Urology; findings include 5 cm mass, unable to stent right-sided hydronephrosis with mass blocking right ureteral orifice  Follow biopsy results for more definitive management  Obtain PSA, Urology following, gross hematuria resolved after procedure      Disposition:  Continued urological workup, maintain Webb, septic workup initiated, appreciate Infectious Disease and Nephrology consultations  Significant other updated over phone this morning, all questions answered  Patient remains inpatient  SUBJECTIVE     Patient seen and examined  Patient noted to be febrile overnight up to 101 2° F  He notes slightly fatigued this morning  He accepts blood transfusion given low hemoglobin, consent obtained  Denies fever/chills, nausea/vomiting/diarrhea, shortness of breath, chest pain, cough, dysuria      OBJECTIVE     Vitals:    22 0244 22 0336 22 0342 22 0830   BP:   108/63 93/59   Pulse: 102 97 93    Resp:       Temp: (!) 101 2 °F (38 4 °C) (!) 100 7 °F (38 2 °C)  98 9 °F (37 2 °C)   TempSrc:       SpO2: 96% 93% 91%       Temperature:   Temp (24hrs), Av 8 °F (38 2 °C), Min:98 9 °F (37 2 °C), Max:101 6 °F (38 7 °C)    Temperature: 98 9 °F (37 2 °C)  Intake & Output:  I/O 05/25 0701 05/26 0700 05/26 0701  05/27 0700 05/27 0701 05/28 0700    P  O   240 120    I V  1000 900     Total Intake 1000 1140 120    Urine 5000 2150     Blood  10     Total Output 5000 2160     Net -4000 -1020 +120               Weights: There is no height or weight on file to calculate BMI  Weight (last 2 days)     None        Physical Exam  Constitutional:       General: He is not in acute distress  Appearance: He is normal weight  He is not toxic-appearing  HENT:      Head: Normocephalic and atraumatic  Eyes:      General: No scleral icterus  Pupils: Pupils are equal, round, and reactive to light  Cardiovascular:      Rate and Rhythm: Normal rate and regular rhythm  Pulses: Normal pulses  Heart sounds: No murmur heard  Pulmonary:      Effort: Pulmonary effort is normal  No respiratory distress  Breath sounds: No wheezing or rales  Abdominal:      General: Bowel sounds are normal  There is no distension  Tenderness: There is abdominal tenderness  There is no guarding or rebound  Musculoskeletal:         General: No swelling  Normal range of motion  Skin:     General: Skin is warm and dry  Capillary Refill: Capillary refill takes less than 2 seconds  Neurological:      General: No focal deficit present  Mental Status: He is alert and oriented to person, place, and time  Mental status is at baseline  Psychiatric:         Mood and Affect: Mood normal          Behavior: Behavior normal        LABORATORY DATA     Labs: I have personally reviewed pertinent reports    Results from last 7 days   Lab Units 05/27/22  0502 05/27/22  0247 05/26/22  0522 05/23/22  1804 05/23/22  0543   WBC Thousand/uL 7 73 8 78 9 19   < > 7 59   HEMOGLOBIN g/dL 7 1* 7 3* 8 0*   < > 9 7*   HEMATOCRIT % 23 2* 23 8* 26 0*   < > 31 7*   PLATELETS Thousands/uL 80* 93* 101*   < > 162   NEUTROS PCT % 67 69  --   --  49   MONOS PCT % 8 7  --   --  9    < > = values in this interval not displayed  Results from last 7 days   Lab Units 05/27/22  0502 05/26/22  0522 05/25/22  1103 05/23/22  3590 05/23/22  0543   POTASSIUM mmol/L 4 3 4 3 4 7   < > 6 0*   CHLORIDE mmol/L 108 109* 107  --  105   CO2 mmol/L 25 26 27  --  29   BUN mg/dL 40* 42* 40*  --  42*   CREATININE mg/dL 2 73* 3 28* 3 08*  --  2 94*   CALCIUM mg/dL 8 0* 8 4 9 1  --  9 0   ALK PHOS U/L 57  --   --   --  85   ALT U/L 9*  --   --   --  25   AST U/L 18  --   --   --  57*    < > = values in this interval not displayed  Results from last 7 days   Lab Units 05/23/22  0543   INR  1 10     Results from last 7 days   Lab Units 05/27/22  0247   LACTIC ACID mmol/L 0 5           IMAGING & DIAGNOSTIC TESTING     Radiology Results: I have personally reviewed pertinent reports  CT abdomen pelvis wo contrast    Result Date: 5/23/2022  Impression: Persistent moderate right-sided hydroureteronephrosis without a discrete ureteral calculus  There is however a large bladder mass now seen which may reflect hematoma and/or neoplasm  There is possible soft tissue density extending into the right ureterovesical junction  This can be further evaluated with cystoscopy  Workstation performed: [id number]     Other Diagnostic Testing: I have personally reviewed pertinent reports      ACTIVE MEDICATIONS     Current Facility-Administered Medications   Medication Dose Route Frequency    acetaminophen (TYLENOL) tablet 650 mg  650 mg Oral Q6H PRN    belladonna-opium (B&O SUPPOSITORY) 16 2-30 mg suppository 1 suppository  30 mg Rectal Q8H PRN    cefepime (MAXIPIME) 1,000 mg in dextrose 5 % 50 mL IVPB  1,000 mg Intravenous Q12H    HYDROmorphone (DILAUDID) injection 0 5 mg  0 5 mg Intravenous Q4H PRN    HYDROmorphone (DILAUDID) tablet 2 mg  2 mg Oral Q4H PRN    HYDROmorphone (DILAUDID) tablet 4 mg  4 mg Oral Q4H PRN    insulin detemir (LEVEMIR) subcutaneous injection 15 Units  15 Units Subcutaneous HS    insulin lispro (HumaLOG) 100 units/mL subcutaneous injection 1-5 Units  1-5 Units Subcutaneous TID AC    insulin lispro (HumaLOG) 100 units/mL subcutaneous injection 1-5 Units  1-5 Units Subcutaneous HS    multi-electrolyte (PLASMALYTE-A/ISOLYTE-S PH 7 4) IV solution  75 mL/hr Intravenous Continuous    ondansetron (ZOFRAN) injection 4 mg  4 mg Intravenous Q6H PRN    oxybutynin (DITROPAN) tablet 5 mg  5 mg Oral TID PRN    pregabalin (LYRICA) capsule 50 mg  50 mg Oral TID    tacrolimus (PROGRAF) capsule 1 mg  1 mg Oral Q12H    tamsulosin (FLOMAX) capsule 0 4 mg  0 4 mg Oral Daily With Dinner    temazepam (RESTORIL) capsule 30 mg  30 mg Oral HS    zolpidem (AMBIEN) tablet 10 mg  10 mg Oral HS       VTE Pharmacologic Prophylaxis:   VTE Mechanical Prophylaxis: sequential compression device    Portions of the record may have been created with voice recognition software  Occasional wrong word or "sound a like" substitutions may have occurred due to the inherent limitations of voice recognition software    Read the chart carefully and recognize, using context, where substitutions have occurred   ==  Luigi Flynn, [facility]  Internal Medicine Residency PGY-3

## 2022-05-27 NOTE — PROGRESS NOTES
NEPHROLOGY PROGRESS NOTE   Pavan Eller 74 y o  male MRN: [id number]  Unit/Bed#: -01 Encounter: [id number]  Reason for Consult:  Acute kidney injury    Assessment/Plan:  1  Acute kidney injury most likely secondary to obstructive uropathy given right-sided hydronephrosis from bladder mass  2  Chronic kidney disease stage 4 with previous baseline creatinine 1 7-2 2 although since March has been between 2 8 in 2 9 although that may be secondary to developing hydronephrosis  3  Gross hematuria, overall appears to be improved status post cystoscopy showing 5 cm mass, biopsy pending  4  Right-sided hydronephrosis secondary to bladder mass, unable to place a ureteral stent, may need percutaneous nephrostomy tube placement  5  Diabetes with associated diabetic kidney disease  6  Liver transplant currently on tacrolimus, Prograf level pending  7  Anemia of chronic kidney disease hemoglobin currently 8 0     PLAN:  · Renal function overall has slightly improved with creatinine of 2 73  · Discussed with Urology, given persistent hydronephrosis will plan for PCN placement  · Prograf level is pending  · Continue with gentle IV fluids  · No other changes in current regimen    SUBJECTIVE:  Seen and examined  Patient awake alert  Family at bedside  No reports of chest pain shortness of breath  No abdominal pain  Complains of discomfort from Webb catheter  Review of Systems    OBJECTIVE:  Current Weight:    Vitals:    05/27/22 0336 05/27/22 0342 05/27/22 0830 05/27/22 1521   BP:  108/63 93/59 97/58   Pulse: 97 93     Resp:    16   Temp: (!) 100 7 °F (38 2 °C)  98 9 °F (37 2 °C) 98 8 °F (37 1 °C)   TempSrc:       SpO2: 93% 91%  95%       Intake/Output Summary (Last 24 hours) at 5/27/2022 1547  Last data filed at 5/27/2022 1401  Gross per 24 hour   Intake 820 ml   Output 1000 ml   Net -180 ml       Physical Exam  Constitutional:       Appearance: He is not ill-appearing  HENT:      Head: Normocephalic and atraumatic  Eyes:      General: No scleral icterus  Cardiovascular:      Rate and Rhythm: Normal rate and regular rhythm  Pulmonary:      Effort: Pulmonary effort is normal       Breath sounds: Normal breath sounds  Abdominal:      General: There is no distension  Palpations: Abdomen is soft  Musculoskeletal:      Right lower leg: No edema  Left lower leg: No edema  Skin:     General: Skin is warm and dry  Neurological:      Mental Status: He is alert and oriented to person, place, and time           Medications:    Current Facility-Administered Medications:     acetaminophen (TYLENOL) tablet 650 mg, 650 mg, Oral, Q6H PRN, Nathaly Kim MD, 650 mg at 05/27/22 0117    belladonna-opium (B&O SUPPOSITORY) 16 2-30 mg suppository 1 suppository, 30 mg, Rectal, Q8H PRN, Nathaly Kim MD, 1 suppository at 05/25/22 2128    cefepime (MAXIPIME) 1,000 mg in dextrose 5 % 50 mL IVPB, 1,000 mg, Intravenous, Q12H, Patrick Hobbs DO, Last Rate: 100 mL/hr at 05/27/22 1025, 1,000 mg at 05/27/22 1025    HYDROmorphone (DILAUDID) injection 0 5 mg, 0 5 mg, Intravenous, Q4H PRN, Nathaly Kim MD, 0 5 mg at 05/27/22 1311    HYDROmorphone (DILAUDID) tablet 2 mg, 2 mg, Oral, Q4H PRN, Nathaly Kim MD    HYDROmorphone (DILAUDID) tablet 4 mg, 4 mg, Oral, Q4H PRN, Nathaly Kim MD, 4 mg at 05/27/22 1025    insulin detemir (LEVEMIR) subcutaneous injection 15 Units, 15 Units, Subcutaneous, HS, Patrick Hobbs DO    insulin lispro (HumaLOG) 100 units/mL subcutaneous injection 1-5 Units, 1-5 Units, Subcutaneous, TID AC, 1 Units at 05/27/22 1259 **AND** Fingerstick Glucose (POCT), , , TID AC, Nathaly Kim MD    insulin lispro (HumaLOG) 100 units/mL subcutaneous injection 1-5 Units, 1-5 Units, Subcutaneous, HS, Nathaly Kim MD, 2 Units at 05/26/22 4598    multi-electrolyte (PLASMALYTE-A/ISOLYTE-S PH 7 4) IV solution, 75 mL/hr, Intravenous, Continuous, Nathaly Kim MD, Last Rate: 75 mL/hr at 05/27/22 0740, 75 mL/hr at 05/27/22 0740    ondansetron (ZOFRAN) injection 4 mg, 4 mg, Intravenous, Q6H PRN, Elana Ruggiero MD    oxybutynin [facility]) tablet 5 mg, 5 mg, Oral, TID PRN, Elana Ruggiero MD    pregabalin (LYRICA) capsule 50 mg, 50 mg, Oral, TID, Elana Ruggiero MD, 50 mg at 05/27/22 1024    tacrolimus (PROGRAF) capsule 1 mg, 1 mg, Oral, Q12H, Elana Ruggiero MD, 1 mg at 05/27/22 1024    tamsulosin (FLOMAX) capsule 0 4 mg, 0 4 mg, Oral, Daily With Dinner, Elana Ruggiero MD, 0 4 mg at 05/26/22 1648    temazepam (RESTORIL) capsule 30 mg, 30 mg, Oral, HS, Elana Ruggiero MD, 30 mg at 05/26/22 2156    zolpidem (AMBIEN) tablet 10 mg, 10 mg, Oral, HS, Elana Ruggiero MD, 10 mg at 05/26/22 2156    Laboratory Results:  Results from last 7 days   Lab Units 05/27/22  0502 05/27/22  0247 05/26/22  0522 05/25/22  1103 05/23/22  1804 05/23/22  0638 05/23/22  0543   WBC Thousand/uL 7 73 8 78 9 19 14 54*  --   --  7 59   HEMOGLOBIN g/dL 7 1* 7 3* 8 0* 9 0* 10 4*  --  9 7*   HEMATOCRIT % 23 2* 23 8* 26 0* 28 5* 33 2*  --  31 7*   PLATELETS Thousands/uL 80* 93* 101* 104*  --   --  162   POTASSIUM mmol/L 4 3  --  4 3 4 7  --  4 5 6 0*   CHLORIDE mmol/L 108  --  109* 107  --   --  105   CO2 mmol/L 25  --  26 27  --   --  29   BUN mg/dL 40*  --  42* 40*  --   --  42*   CREATININE mg/dL 2 73*  --  3 28* 3 08*  --   --  2 94*   CALCIUM mg/dL 8 0*  --  8 4 9 1  --   --  9 0

## 2022-05-27 NOTE — PROGRESS NOTES
Progress Note - urology  Hanane Houser 74 y o  male MRN: [id number]  Unit/Bed#: -01 Encounter: [id number]    Assessment & Plan:     Gross hematuria:  -patient presenting with gross hematuria with possible distal ureteral and right ureteral junction abnormality  -CT scan reveals persistent moderate right-sided hydroureteronephrosis without discrete ureteral calculus  There was however a large bladder mass now seen which may reflect hematoma/neoplasm  There is a possible soft tissue density extending into the right ureteral vesicular junction  This can be further evaluated with cystoscopy  -patient now postop day 1 transurethral section of the bladder tumor  -intraoperative findings included 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 encompassing the mid trigone, right trigone and of the right lateral wallAt no point was the right ureteral orifice visualized to be able to intubate perform retrograde pyelogram or ureteroscopy  After administration of methylene blue and Lasix, left ureteral orifice was identified the right ureteral orifice was not  Superficial and deep tumor sent as separate specimens, area fulgurated with button electrode, appearance of deep cancer invasion into muscle layer  Bimanual examination did not demonstrate fixation of the right posterior bladder, abdomen remains soft during and after resection  -renal ultrasound performed today reveals persistent hydronephrosis on the right side unchanged  Nephrology also believes that worsening in renal function most likely related to patient's obstruction  Patient may also require chemotherapy in the near future therefore want to optimize patient's renal function for treatment   -at this time will move forward with PCN placement on the right  NPO at midnight, consult to IR   -had a long discussion with patient and his family at bedside    Discussed his presentation to the hospital and CT findings along with findings intraoperatively yesterday  Discussed that a bladder mass was discovered and tissue collected from procedure yesterday will be sent for pathology  This typically takes approximately 2 weeks  Also discussed the inability to find the right ureteral orifice in inability to place ureteral stent  Discussed repeat ultrasound findings revealing persistent hydronephrosis  And continued elevated creatinine  Discussed importance of decompression for renal preservation for possible treatment options in the future  Discussed right PCN placement and that this was performed with Interventional Radiology under sedation  Discussed possible eventual internalization of PCN depending on further treatments and regression  Patient and family are appreciative of our discussion  They are currently in agreement to move forward with PCN placement   -creatinine mildly improved today at 2 76  -CBI discontinued, manual irrigation p r n  urine currently blue in color due to methylene blue   -serial H&H, hemoglobin 10 4 today  -urethral Webb catheter remain in place for 1 week, removal outpatient with Urology      Urology will continue to follow  Subjective/Objective   Chief Complaint:  None    Subjective:   Patient lying comfortably in bed no acute distress denying any nausea or vomiting  Was having fevers overnight  Denies any chills  Reports some mild discomfort from intervention yesterday  Objective:     Blood pressure 93/59, pulse 93, temperature 98 9 °F (37 2 °C), resp  rate 17, SpO2 91 %  ,There is no height or weight on file to calculate BMI        Intake/Output Summary (Last 24 hours) at 5/27/2022 1112  Last data filed at 5/27/2022 0830  Gross per 24 hour   Intake 360 ml   Output 1500 ml   Net -1140 ml       Invasive Devices  Report    Peripherally Inserted Central Catheter Line  Duration           PICC Line 05/24/22 Right Brachial 2 days          Drain  Duration           Continuous Bladder Irrigation Three-way 1 day Physical Exam  Constitutional:       General: He is not in acute distress  Appearance: He is normal weight  He is not ill-appearing, toxic-appearing or diaphoretic  HENT:      Head: Normocephalic and atraumatic  Right Ear: External ear normal       Left Ear: External ear normal       Nose: Nose normal       Mouth/Throat:      Pharynx: Oropharynx is clear  Eyes:      General: No scleral icterus  Conjunctiva/sclera: Conjunctivae normal    Cardiovascular:      Rate and Rhythm: Tachycardia present  Pulses: Normal pulses  Heart sounds: No murmur heard  No friction rub  No gallop  Pulmonary:      Effort: Pulmonary effort is normal  No respiratory distress  Breath sounds: No wheezing, rhonchi or rales  Abdominal:      General: Bowel sounds are normal  There is no distension  Tenderness: There is no abdominal tenderness  There is no right CVA tenderness or left CVA tenderness  Genitourinary:     Comments: Urine currently blue in color  Musculoskeletal:         General: Normal range of motion  Skin:     General: Skin is warm and dry  Neurological:      General: No focal deficit present  Mental Status: He is alert and oriented to person, place, and time  Psychiatric:         Mood and Affect: Mood normal          Behavior: Behavior normal          Thought Content: Thought content normal          Judgment: Judgment normal            Lab, Imaging and other studies:I have personally reviewed pertinent lab results    Lab Results   Component Value Date    WBC 7 73 05/27/2022    HGB 7 1 (L) 05/27/2022    HCT 23 2 (L) 05/27/2022    MCV 79 (L) 05/27/2022    PLT 80 (L) 05/27/2022     Lab Results   Component Value Date    SODIUM 139 05/27/2022    K 4 3 05/27/2022     05/27/2022    CO2 25 05/27/2022    BUN 40 (H) 05/27/2022    CREATININE 2 73 (H) 05/27/2022    GLUC 43 (L) 05/27/2022    CALCIUM 8 0 (L) 05/27/2022         VTE Pharmacologic Prophylaxis: Reason for no pharmacologic prophylaxis Gross hematuria  VTE Mechanical Prophylaxis: sequential compression device      Josué Crowder PA-C

## 2022-05-27 NOTE — PLAN OF CARE
Problem: MOBILITY - ADULT  Goal: Maintain or return to baseline ADL function  Description: INTERVENTIONS:  -  Assess patient's ability to carry out ADLs; assess patient's baseline for ADL function and identify physical deficits which impact ability to perform ADLs (bathing, care of mouth/teeth, toileting, grooming, dressing, etc )  - Assess/evaluate cause of self-care deficits   - Assess range of motion  - Assess patient's mobility; develop plan if impaired  - Assess patient's need for assistive devices and provide as appropriate  - Encourage maximum independence but intervene and supervise when necessary  - Involve family in performance of ADLs  - Assess for home care needs following discharge   - Consider OT consult to assist with ADL evaluation and planning for discharge  - Provide patient education as appropriate  Outcome: Progressing  Goal: Maintains/Returns to pre admission functional level  Description: INTERVENTIONS:  - Perform BMAT or MOVE assessment daily    - Set and communicate daily mobility goal to care team and patient/family/caregiver  - Collaborate with rehabilitation services on mobility goals if consulted  - Perform Range of Motion 3 times a day  - Reposition patient every 3 hours    - Dangle patient 3 times a day  - Stand patient 3 times a day  - Ambulate patient 3 times a day  - Out of bed to chair 3 times a day   - Out of bed for meals 3 times a day  - Out of bed for toileting  - Record patient progress and toleration of activity level   Outcome: Progressing     Problem: CARDIOVASCULAR - ADULT  Goal: Maintains optimal cardiac output and hemodynamic stability  Description: INTERVENTIONS:  - Monitor I/O, vital signs and rhythm  - Monitor for S/S and trends of decreased cardiac output  - Administer and titrate ordered vasoactive medications to optimize hemodynamic stability  - Assess quality of pulses, skin color and temperature  - Assess for signs of decreased coronary artery perfusion  - Instruct patient to report change in severity of symptoms  Outcome: Progressing  Goal: Absence of cardiac dysrhythmias or at baseline rhythm  Description: INTERVENTIONS:  - Continuous cardiac monitoring, vital signs, obtain 12 lead EKG if ordered  - Administer antiarrhythmic and heart rate control medications as ordered  - Monitor electrolytes and administer replacement therapy as ordered  Outcome: Progressing     Problem: RESPIRATORY - ADULT  Goal: Achieves optimal ventilation and oxygenation  Description: INTERVENTIONS:  - Assess for changes in respiratory status  - Assess for changes in mentation and behavior  - Position to facilitate oxygenation and minimize respiratory effort  - Oxygen administered by appropriate delivery if ordered  - Initiate smoking cessation education as indicated  - Encourage broncho-pulmonary hygiene including cough, deep breathe, Incentive Spirometry  - Assess the need for suctioning and aspirate as needed  - Assess and instruct to report SOB or any respiratory difficulty  - Respiratory Therapy support as indicated  Outcome: Progressing     Problem: GENITOURINARY - ADULT  Goal: Maintains or returns to baseline urinary function  Description: INTERVENTIONS:  - Assess urinary function  - Encourage oral fluids to ensure adequate hydration if ordered  - Administer IV fluids as ordered to ensure adequate hydration  - Administer ordered medications as needed  - Offer frequent toileting  - Follow urinary retention protocol if ordered  Outcome: Progressing  Goal: Absence of urinary retention  Description: INTERVENTIONS:  - Assess patients ability to void and empty bladder  - Monitor I/O  - Bladder scan as needed  - Discuss with physician/AP medications to alleviate retention as needed  - Discuss catheterization for long term situations as appropriate  Outcome: Progressing  Goal: Urinary catheter remains patent  Description: INTERVENTIONS:  - Assess patency of urinary catheter  - If patient has a chronic flores, consider changing catheter if non-functioning  - Follow guidelines for intermittent irrigation of non-functioning urinary catheter  Outcome: Progressing     Problem: MUSCULOSKELETAL - ADULT  Goal: Maintain or return mobility to safest level of function  Description: INTERVENTIONS:  - Assess patient's ability to carry out ADLs; assess patient's baseline for ADL function and identify physical deficits which impact ability to perform ADLs (bathing, care of mouth/teeth, toileting, grooming, dressing, etc )  - Assess/evaluate cause of self-care deficits   - Assess range of motion  - Assess patient's mobility  - Assess patient's need for assistive devices and provide as appropriate  - Encourage maximum independence but intervene and supervise when necessary  - Involve family in performance of ADLs  - Assess for home care needs following discharge   - Consider OT consult to assist with ADL evaluation and planning for discharge  - Provide patient education as appropriate  Outcome: Progressing  Goal: Maintain proper alignment of affected body part  Description: INTERVENTIONS:  - Support, maintain and protect limb and body alignment  - Provide patient/ family with appropriate education  Outcome: Progressing     Problem: Potential for Falls  Goal: Patient will remain free of falls  Description: INTERVENTIONS:  - Educate patient/family on patient safety including physical limitations  - Instruct patient to call for assistance with activity   - Consult OT/PT to assist with strengthening/mobility   - Keep Call bell within reach  - Keep bed low and locked with side rails adjusted as appropriate  - Keep care items and personal belongings within reach  - Initiate and maintain comfort rounds  - Make Fall Risk Sign visible to staff  - Offer Toileting every 3 Hours, in advance of need  - Initiate/Maintain bed alarm  - Obtain necessary fall risk management equipment: bed alarms  - Apply yellow socks and bracelet for high fall risk patients  - Consider moving patient to room near nurses station  Outcome: Progressing     Problem: Knowledge Deficit  Goal: Patient/family/caregiver demonstrates understanding of disease process, treatment plan, medications, and discharge instructions  Description: Complete learning assessment and assess knowledge base    Interventions:  - Provide teaching at level of understanding  - Provide teaching via preferred learning methods  Outcome: Progressing

## 2022-05-27 NOTE — CONSULTS
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  5  History of CVA S/P left frontotemporal craniotomy    Recommendations:    Discuss with the primary service  1  Await final culture results of blood urine   2  Pending above agree with cefepime 1 g q 12 hours IV      HISTORY OF PRESENT ILLNESS:    Reason for Consult:  Postoperative fever  HPI: Claudine Quezada is a 74y o  year old male with complex past history was originally admitted on 05/23 with gross hematuria developing that day  He has a prior history of alcoholic Laennec's cirrhosis, hepatitis C,  liver transplant and has been maintained on tacrolimus  followed by the transplant team at [facility], CVA s/p left frontotemporal craniotomy for 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, type 2 DM with CKD stage 4 and now also followed at [facility] for possible future renal transplantation  A Webb catheter was inserted on admission with continuous bladder irrigation  A CT scan revealed persistent moderate right-sided hydroureteronephrosis without a discrete ureteral calculus  There was also a large bladder mass which may reflect hematoma or neoplasm and a possible soft tissue density extending into the right ureterovesical junction that further cystoscopy was advised PICC line was placed  On 05/26 the patient underwent a transurethral resection of a bladder tumor with cefazolin IV given preoperatively     Pathology results pending  Patient had a T-max of 101 2° yesterday    Blood cultures and urine culture were taken and today cefepime was begun at 1 g q 12 hours IV  Patient's creatinine is 2 73  Patient is allergic to quinolones  Review of Systems patient's only symptom is some mild pelvic discomfort and current use of Webb catheter  A [id number] point system-based review of systems is otherwise negative      PAST MEDICAL HISTORY:  Past Medical History:   Diagnosis Date    Alcoholism ([facility] )     Cerebral artery aneurysm     Change in mental state     last assessed 5/18/15; resolved 10/27/15    Diabetes mellitus [facility]     Drug dependence ([facility] )     Fatigue     last assessed 1/26/15; resolved 5/24/16    Hepatitis [facility] discharge follow-up 12/21/2018    Kidney disease     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[facility] )     [facility])     Renal disorder     Subdural hygroma     2/27/14; resolved 7/28/15    Thrombocytopenia ([facility] ) 9/20/2017     Past Surgical History:   Procedure Laterality Date    BRAIN SURGERY  02/12/2014    left frontotemporal 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    CATARACT EXTRACTION      CHOLECYSTECTOMY      FL LUMBAR PUNCTURE DIAGNOSTIC  12/14/2018    IR PICC LINE  12/14/2018    LIVER TRANSPLANTATION      ROTATOR CUFF REPAIR      SHOULDER SURGERY         FAMILY HISTORY:  Non-contributory    SOCIAL HISTORY:  Social History   /Civil Union  Social History     Substance and Sexual Activity   Alcohol Use Not Currently     Social History     Substance and Sexual Activity   Drug Use No    Comment: remotely quit drug use per [employer]      Social History     Tobacco Use   Smoking Status Never Smoker   Smokeless Tobacco Never Used   Tobacco Comment    former smoker per [employer]        ALLERGIES:  Allergies   Allergen Reactions    Tequin [Gatifloxacin] Rash    Ciprofloxacin     Iv Contrast [Iodinated Diagnostic Agents]     Levofloxacin Rash    Lipitor [Atorvastatin] Rash     Rash and itching    Omnipaque [Iohexol]        MEDICATIONS:  All current active medications have been reviewed        PHYSICAL EXAM:  Temp:  [98 4 °F (36 9 °C)-101 6 °F (38 7 °C)] 98 4 °F (36 9 °C)  HR:  [] 83  Resp:  [16-18] 16  BP: ()/(58-71) 134/62  SpO2:  [91 %-97 %] 96 %  Temp (24hrs), Av 9 °F (37 7 °C), Min:98 4 °F (36 9 °C), Max:101 6 °F (38 7 °C)  Current: Temperature: 98 4 °F (36 9 °C)    Intake/Output Summary (Last 24 hours) at 2022 1709  Last data filed at 2022 1401  Gross per 24 hour   Intake 820 ml   Output 1000 ml   Net -180 ml       General Appearance:  Appearing well, nontoxic, and in no distress, appears stated age   Head:  Normocephalic, without obvious abnormality, atraumatic   Eyes:  PERRL, conjunctiva  pale and sclera anicteric, both eyes   Nose: Nares normal, mucosa normal, no drainage   Throat: Oropharynx moist without lesions; lips, mucosa, and tongue normal; teeth and gums normal   Neck: Supple, symmetrical, trachea midline, no adenopathy, no tenderness/mass/nodules   Back:   Symmetric, no curvature, ROM normal, no CVA tenderness   Lungs:   Clear to auscultation bilaterally, no audible wheezes, rhonchi and rales, respirations unlabored   Chest Wall:  No tenderness or deformity   Heart:  Regular rate and rhythm, S1, S2 normal, no murmur, rub or gallop   Abdomen:    Surgical scars, nontender    No CVA tenderness, Webb catheter with clear urine   Extremities: Extremities normal, atraumatic, no cyanosis, clubbing or edema   Skin:  As above, tattoos, multiple hyperpigmented senile keratotic lesions of back   Lymph nodes: Cervical, supraclavicular, and axillary nodes normal   Neurologic: Alert and oriented times 3, extremity strength 5/5 and symmetric           Invasive Devices:   PICC Line 22 Right Brachial (Active)   Reasons to continue PICC No peripheral vascular access 22   Goal for Removal D/C when no longer on IV medications 22   Line Necessity Reviewed Yes, reviewed with provider 05/25/22 0240   Site Assessment WDL 05/26/22 0915   #1 Lumen Color/Status Red lumen;Capped;Flushed 05/26/22 2100   #2 Lumen Color/Status Purple lumen;Capped;Flushed 05/26/22 2100   Catheter Out on Skin (cm) 1 cm 05/24/22 1500   Extremity Circumference (cm) 25 cm 05/24/22 1500   Dressing Type Transparent;Securing device 05/26/22 2100   Dressing Status Clean;Dry; Intact 05/26/22 2100   Dressing Change Due 05/31/22 05/26/22 2100       Continuous Bladder Irrigation Three-way (Active)   Site Assessment Clean;Skin intact 05/26/22 0837   Securement Method Securing device (Describe) 05/26/22 0915   Irrigant Normal saline 05/26/22 0915   CBI Irrigation Intake (mL) 0 mL 05/27/22 0501   CBI Webb Output (mL) 600 mL 05/27/22 1401   CBI Net Output (mL) 1000 mL 05/27/22 0501   Webb Output Appearance Clear 05/27/22 1401       LABS, IMAGING, & OTHER STUDIES:  Lab Results:      I have personally reviewed pertinent labs  Results from last 7 days   Lab Units 05/27/22  0502 05/27/22  0247 05/26/22  0522   WBC Thousand/uL 7 73 8 78 9 19   HEMOGLOBIN g/dL 7 1* 7 3* 8 0*   PLATELETS Thousands/uL 80* 93* 101*     Results from last 7 days   Lab Units 05/27/22  0502 05/26/22  0522 05/25/22  1103 05/23/22  0638 05/23/22  0543   SODIUM mmol/L 139 142 137  --  134*   POTASSIUM mmol/L 4 3 4 3 4 7   < > 6 0*   CHLORIDE mmol/L 108 109* 107  --  105   CO2 mmol/L 25 26 27  --  29   BUN mg/dL 40* 42* 40*  --  42*   CREATININE mg/dL 2 73* 3 28* 3 08*  --  2 94*   EGFR ml/min/1 73sq m 21 17 18  --  20   CALCIUM mg/dL 8 0* 8 4 9 1  --  9 0   AST U/L 18  --   --   --  57*   ALT U/L 9*  --   --   --  25   ALK PHOS U/L 57  --   --   --  85    < > = values in this interval not displayed  Results from last 7 days   Lab Units 05/27/22  1003 05/23/22  0612   BLOOD CULTURE  Received in Microbiology Lab  Culture in Progress  Received in Microbiology Lab  Culture in Progress    --    URINE CULTURE   --  No Growth <1000 cfu/mL       Imaging Studies:   I have personally reviewed pertinent imaging study reports and images in PACS  EKG, Pathology, and Other Studies:   I have personally reviewed pertinent reports

## 2022-05-27 NOTE — TELEMEDICINE
e-Consult (IPC)  - Interventional Radiology  Aster Galvez 74 y o  male MRN: [id number]  Unit/Bed#: -09 Encounter: [id number]          Interventional Radiology has been consulted to evaluate Aster Galvez    We were consulted by urology concerning this patient with bladder ca, right hydronephrosis  IP Consult to IR  Consult performed by: Tory Cash PA-C  Consult ordered by: Cristian Cooper PA-C        05/27/22    Assessment/Recommendation:     74year old male with pmh of DM, CKD IV, bladder ca, s/p TURBT yesterday unable to place stent  Patient with persistant hydronephrosis  Hopes for chemotherapy    - will plan for right nephrostomy placement tomorrow 5/28  - please keep npo after midnight  - please hold all anticoagulation    Total time spent in review of data, discussion with requesting provider and rendering advice was 25 minutes     Thank you for allowing Interventional Radiology to participate in the care of Aster Galvez  Please don't hesitate to call or TigerText us with any questions       Tory Cash PA-C

## 2022-05-28 ENCOUNTER — ANESTHESIA EVENT (INPATIENT)
Dept: RADIOLOGY | Facility: HOSPITAL | Age: 72
DRG: 669 | End: 2022-05-28
Payer: COMMERCIAL

## 2022-05-28 ENCOUNTER — APPOINTMENT (INPATIENT)
Dept: RADIOLOGY | Facility: HOSPITAL | Age: 72
DRG: 669 | End: 2022-05-28
Payer: COMMERCIAL

## 2022-05-28 ENCOUNTER — ANESTHESIA (INPATIENT)
Dept: RADIOLOGY | Facility: HOSPITAL | Age: 72
DRG: 669 | End: 2022-05-28
Payer: COMMERCIAL

## 2022-05-28 LAB
ABO GROUP BLD BPU: NORMAL
ANION GAP SERPL CALCULATED.3IONS-SCNC: 3 MMOL/L (ref 4–13)
BASOPHILS # BLD AUTO: 0.03 THOUSANDS/ΜL (ref 0–0.1)
BASOPHILS NFR BLD AUTO: 0 % (ref 0–1)
BPU ID: NORMAL
BUN SERPL-MCNC: 37 MG/DL (ref 5–25)
CALCIUM SERPL-MCNC: 8.3 MG/DL (ref 8.3–10.1)
CHLORIDE SERPL-SCNC: 107 MMOL/L (ref 100–108)
CO2 SERPL-SCNC: 29 MMOL/L (ref 21–32)
CREAT SERPL-MCNC: 2.81 MG/DL (ref 0.6–1.3)
CROSSMATCH: NORMAL
EOSINOPHIL # BLD AUTO: 0.76 THOUSAND/ΜL (ref 0–0.61)
EOSINOPHIL NFR BLD AUTO: 9 % (ref 0–6)
ERYTHROCYTE [DISTWIDTH] IN BLOOD BY AUTOMATED COUNT: 14.7 % (ref 11.6–15.1)
GFR SERPL CREATININE-BSD FRML MDRD: 21 ML/MIN/1.73SQ M
GLUCOSE SERPL-MCNC: 125 MG/DL (ref 65–140)
GLUCOSE SERPL-MCNC: 131 MG/DL (ref 65–140)
GLUCOSE SERPL-MCNC: 140 MG/DL (ref 65–140)
GLUCOSE SERPL-MCNC: 143 MG/DL (ref 65–140)
GLUCOSE SERPL-MCNC: 162 MG/DL (ref 65–140)
GLUCOSE SERPL-MCNC: 286 MG/DL (ref 65–140)
GLUCOSE SERPL-MCNC: 380 MG/DL (ref 65–140)
HCT VFR BLD AUTO: 26.1 % (ref 36.5–49.3)
HGB BLD-MCNC: 8.3 G/DL (ref 12–17)
IMM GRANULOCYTES # BLD AUTO: 0.03 THOUSAND/UL (ref 0–0.2)
IMM GRANULOCYTES NFR BLD AUTO: 0 % (ref 0–2)
LYMPHOCYTES # BLD AUTO: 2.22 THOUSANDS/ΜL (ref 0.6–4.47)
LYMPHOCYTES NFR BLD AUTO: 27 % (ref 14–44)
MCH RBC QN AUTO: 24.7 PG (ref 26.8–34.3)
MCHC RBC AUTO-ENTMCNC: 31.8 G/DL (ref 31.4–37.4)
MCV RBC AUTO: 78 FL (ref 82–98)
MONOCYTES # BLD AUTO: 0.7 THOUSAND/ΜL (ref 0.17–1.22)
MONOCYTES NFR BLD AUTO: 9 % (ref 4–12)
NEUTROPHILS # BLD AUTO: 4.41 THOUSANDS/ΜL (ref 1.85–7.62)
NEUTS SEG NFR BLD AUTO: 55 % (ref 43–75)
NRBC BLD AUTO-RTO: 0 /100 WBCS
PLATELET # BLD AUTO: 92 THOUSANDS/UL (ref 149–390)
PMV BLD AUTO: 12.3 FL (ref 8.9–12.7)
POTASSIUM SERPL-SCNC: 4.3 MMOL/L (ref 3.5–5.3)
RBC # BLD AUTO: 3.36 MILLION/UL (ref 3.88–5.62)
SODIUM SERPL-SCNC: 139 MMOL/L (ref 136–145)
UNIT DISPENSE STATUS: NORMAL
UNIT PRODUCT CODE: NORMAL
UNIT PRODUCT VOLUME: 350 ML
UNIT RH: NORMAL
WBC # BLD AUTO: 8.15 THOUSAND/UL (ref 4.31–10.16)

## 2022-05-28 PROCEDURE — 0T9030Z DRAINAGE OF RIGHT KIDNEY WITH DRAINAGE DEVICE, PERCUTANEOUS APPROACH: ICD-10-PCS | Performed by: RADIOLOGY

## 2022-05-28 PROCEDURE — 99232 SBSQ HOSP IP/OBS MODERATE 35: CPT | Performed by: INTERNAL MEDICINE

## 2022-05-28 PROCEDURE — NC001 PR NO CHARGE: Performed by: RADIOLOGY

## 2022-05-28 PROCEDURE — C1729 CATH, DRAINAGE: HCPCS

## 2022-05-28 PROCEDURE — 80048 BASIC METABOLIC PNL TOTAL CA: CPT | Performed by: STUDENT IN AN ORGANIZED HEALTH CARE EDUCATION/TRAINING PROGRAM

## 2022-05-28 PROCEDURE — 87205 SMEAR GRAM STAIN: CPT | Performed by: PHYSICIAN ASSISTANT

## 2022-05-28 PROCEDURE — 50432 PLMT NEPHROSTOMY CATHETER: CPT | Performed by: RADIOLOGY

## 2022-05-28 PROCEDURE — 82948 REAGENT STRIP/BLOOD GLUCOSE: CPT

## 2022-05-28 PROCEDURE — C1894 INTRO/SHEATH, NON-LASER: HCPCS

## 2022-05-28 PROCEDURE — 85025 COMPLETE CBC W/AUTO DIFF WBC: CPT | Performed by: STUDENT IN AN ORGANIZED HEALTH CARE EDUCATION/TRAINING PROGRAM

## 2022-05-28 PROCEDURE — 99232 SBSQ HOSP IP/OBS MODERATE 35: CPT | Performed by: UROLOGY

## 2022-05-28 PROCEDURE — 87070 CULTURE OTHR SPECIMN AEROBIC: CPT | Performed by: PHYSICIAN ASSISTANT

## 2022-05-28 PROCEDURE — 50432 PLMT NEPHROSTOMY CATHETER: CPT

## 2022-05-28 RX ORDER — ALBUTEROL SULFATE 2.5 MG/3ML
2.5 SOLUTION RESPIRATORY (INHALATION) EVERY 4 HOURS PRN
Status: DISCONTINUED | OUTPATIENT
Start: 2022-05-28 | End: 2022-05-28

## 2022-05-28 RX ORDER — LIDOCAINE HYDROCHLORIDE 10 MG/ML
INJECTION, SOLUTION EPIDURAL; INFILTRATION; INTRACAUDAL; PERINEURAL AS NEEDED
Status: DISCONTINUED | OUTPATIENT
Start: 2022-05-28 | End: 2022-05-28

## 2022-05-28 RX ORDER — DEXAMETHASONE SODIUM PHOSPHATE 10 MG/ML
INJECTION, SOLUTION INTRAMUSCULAR; INTRAVENOUS AS NEEDED
Status: DISCONTINUED | OUTPATIENT
Start: 2022-05-28 | End: 2022-05-28

## 2022-05-28 RX ORDER — SODIUM CHLORIDE 9 MG/ML
100 INJECTION, SOLUTION INTRAVENOUS CONTINUOUS
Status: DISCONTINUED | OUTPATIENT
Start: 2022-05-28 | End: 2022-05-28

## 2022-05-28 RX ORDER — LIDOCAINE WITH 8.4% SOD BICARB 0.9%(10ML)
SYRINGE (ML) INJECTION CODE/TRAUMA/SEDATION MEDICATION
Status: COMPLETED | OUTPATIENT
Start: 2022-05-28 | End: 2022-05-28

## 2022-05-28 RX ORDER — ROCURONIUM BROMIDE 10 MG/ML
INJECTION, SOLUTION INTRAVENOUS AS NEEDED
Status: DISCONTINUED | OUTPATIENT
Start: 2022-05-28 | End: 2022-05-28

## 2022-05-28 RX ORDER — FENTANYL CITRATE/PF 50 MCG/ML
25 SYRINGE (ML) INJECTION
Status: DISCONTINUED | OUTPATIENT
Start: 2022-05-28 | End: 2022-05-30 | Stop reason: HOSPADM

## 2022-05-28 RX ORDER — SODIUM CHLORIDE 9 MG/ML
INJECTION, SOLUTION INTRAVENOUS CONTINUOUS PRN
Status: DISCONTINUED | OUTPATIENT
Start: 2022-05-28 | End: 2022-05-28

## 2022-05-28 RX ORDER — FENTANYL CITRATE 50 UG/ML
INJECTION, SOLUTION INTRAMUSCULAR; INTRAVENOUS AS NEEDED
Status: DISCONTINUED | OUTPATIENT
Start: 2022-05-28 | End: 2022-05-28

## 2022-05-28 RX ORDER — DIPHENHYDRAMINE HYDROCHLORIDE 50 MG/ML
INJECTION INTRAMUSCULAR; INTRAVENOUS CODE/TRAUMA/SEDATION MEDICATION
Status: COMPLETED | OUTPATIENT
Start: 2022-05-28 | End: 2022-05-28

## 2022-05-28 RX ORDER — ONDANSETRON 2 MG/ML
4 INJECTION INTRAMUSCULAR; INTRAVENOUS ONCE AS NEEDED
Status: COMPLETED | OUTPATIENT
Start: 2022-05-28 | End: 2022-05-28

## 2022-05-28 RX ORDER — PROPOFOL 10 MG/ML
INJECTION, EMULSION INTRAVENOUS AS NEEDED
Status: DISCONTINUED | OUTPATIENT
Start: 2022-05-28 | End: 2022-05-28

## 2022-05-28 RX ADMIN — IOHEXOL 5 ML: 350 INJECTION, SOLUTION INTRAVENOUS at 10:52

## 2022-05-28 RX ADMIN — CEFEPIME HYDROCHLORIDE 1000 MG: 1 INJECTION, POWDER, FOR SOLUTION INTRAMUSCULAR; INTRAVENOUS at 21:31

## 2022-05-28 RX ADMIN — HYDROMORPHONE HYDROCHLORIDE 2 MG: 2 TABLET ORAL at 14:51

## 2022-05-28 RX ADMIN — PREGABALIN 50 MG: 50 CAPSULE ORAL at 21:32

## 2022-05-28 RX ADMIN — SODIUM CHLORIDE, SODIUM GLUCONATE, SODIUM ACETATE, POTASSIUM CHLORIDE, MAGNESIUM CHLORIDE, SODIUM PHOSPHATE, DIBASIC, AND POTASSIUM PHOSPHATE 75 ML/HR: .53; .5; .37; .037; .03; .012; .00082 INJECTION, SOLUTION INTRAVENOUS at 14:53

## 2022-05-28 RX ADMIN — FENTANYL CITRATE 100 MCG: 0.05 INJECTION, SOLUTION INTRAMUSCULAR; INTRAVENOUS at 10:12

## 2022-05-28 RX ADMIN — INSULIN LISPRO 4 UNITS: 100 INJECTION, SOLUTION INTRAVENOUS; SUBCUTANEOUS at 17:26

## 2022-05-28 RX ADMIN — SUGAMMADEX 200 MG: 100 INJECTION, SOLUTION INTRAVENOUS at 10:54

## 2022-05-28 RX ADMIN — PREGABALIN 50 MG: 50 CAPSULE ORAL at 17:26

## 2022-05-28 RX ADMIN — DEXAMETHASONE SODIUM PHOSPHATE 10 MG: 10 INJECTION, SOLUTION INTRAMUSCULAR; INTRAVENOUS at 10:13

## 2022-05-28 RX ADMIN — ZOLPIDEM TARTRATE 10 MG: 5 TABLET ORAL at 21:32

## 2022-05-28 RX ADMIN — HYDROMORPHONE HYDROCHLORIDE 4 MG: 4 TABLET ORAL at 20:43

## 2022-05-28 RX ADMIN — ROCURONIUM BROMIDE 50 MG: 50 INJECTION INTRAVENOUS at 10:12

## 2022-05-28 RX ADMIN — HYDROCORTISONE SODIUM SUCCINATE 200 MG: 100 INJECTION, POWDER, FOR SOLUTION INTRAMUSCULAR; INTRAVENOUS at 09:52

## 2022-05-28 RX ADMIN — Medication 10 ML: at 10:32

## 2022-05-28 RX ADMIN — INSULIN DETEMIR 12 UNITS: 100 INJECTION, SOLUTION SUBCUTANEOUS at 21:31

## 2022-05-28 RX ADMIN — PREGABALIN 50 MG: 50 CAPSULE ORAL at 09:04

## 2022-05-28 RX ADMIN — PROPOFOL 50 MG: 10 INJECTION, EMULSION INTRAVENOUS at 10:12

## 2022-05-28 RX ADMIN — SODIUM CHLORIDE: 0.9 INJECTION, SOLUTION INTRAVENOUS at 10:00

## 2022-05-28 RX ADMIN — INSULIN LISPRO 1 UNITS: 100 INJECTION, SOLUTION INTRAVENOUS; SUBCUTANEOUS at 06:07

## 2022-05-28 RX ADMIN — DIPHENHYDRAMINE HYDROCHLORIDE 50 MG: 50 INJECTION, SOLUTION INTRAMUSCULAR; INTRAVENOUS at 09:47

## 2022-05-28 RX ADMIN — ONDANSETRON 4 MG: 2 INJECTION INTRAMUSCULAR; INTRAVENOUS at 10:13

## 2022-05-28 RX ADMIN — TACROLIMUS 1 MG: 1 CAPSULE ORAL at 09:04

## 2022-05-28 RX ADMIN — LIDOCAINE HYDROCHLORIDE 50 MG: 10 INJECTION, SOLUTION EPIDURAL; INFILTRATION; INTRACAUDAL at 10:12

## 2022-05-28 RX ADMIN — TACROLIMUS 1 MG: 1 CAPSULE ORAL at 21:33

## 2022-05-28 RX ADMIN — INSULIN LISPRO 2 UNITS: 100 INJECTION, SOLUTION INTRAVENOUS; SUBCUTANEOUS at 21:30

## 2022-05-28 RX ADMIN — HYDROMORPHONE HYDROCHLORIDE 2 MG: 2 TABLET ORAL at 05:41

## 2022-05-28 RX ADMIN — TAMSULOSIN HYDROCHLORIDE 0.4 MG: 0.4 CAPSULE ORAL at 17:26

## 2022-05-28 RX ADMIN — CEFEPIME HYDROCHLORIDE 1000 MG: 1 INJECTION, POWDER, FOR SOLUTION INTRAMUSCULAR; INTRAVENOUS at 10:00

## 2022-05-28 RX ADMIN — OXYBUTYNIN CHLORIDE 5 MG: 5 TABLET ORAL at 09:04

## 2022-05-28 RX ADMIN — HYDROMORPHONE HYDROCHLORIDE 0.5 MG: 1 INJECTION, SOLUTION INTRAMUSCULAR; INTRAVENOUS; SUBCUTANEOUS at 17:32

## 2022-05-28 RX ADMIN — ONDANSETRON 4 MG: 2 INJECTION INTRAMUSCULAR; INTRAVENOUS at 11:47

## 2022-05-28 RX ADMIN — TEMAZEPAM 30 MG: 15 CAPSULE ORAL at 21:32

## 2022-05-28 NOTE — ANESTHESIA POSTPROCEDURE EVALUATION
Post-Op Assessment Note    CV Status:  Stable    Pain management: adequate     Mental Status:  Arousable and sleepy   Hydration Status:  Euvolemic   PONV Controlled:  Controlled   Airway Patency:  Patent      Post Op Vitals Reviewed: Yes      Staff: Anesthesiologist, CRNA         No complications documented      /73 (05/28/22 1109)    Temp 98 1 °F (36 7 °C) (05/28/22 1109)    Pulse 74 (05/28/22 1109)   Resp 16 (05/28/22 1109)    SpO2 100 % (05/28/22 1109)

## 2022-05-28 NOTE — PROGRESS NOTES
NEPHROLOGY PROGRESS NOTE   Mariangel Sarmiento 74 y o  male MRN: [id number]  Unit/Bed#: -01 Encounter: [id number]  Reason for Consult:  Acute kidney injury    Assessment/Plan:  1  Acute kidney injury most likely secondary to obstructive uropathy given right-sided hydronephrosis from bladder mass  2  Chronic kidney disease stage 4 with previous baseline creatinine 1 7-2 2 although since March has been between 2 8 in 2 9 although that may be secondary to developing hydronephrosis  3  Gross hematuria, overall appears to be improved status post cystoscopy showing 5 cm mass, biopsy pending  4  Right-sided hydronephrosis secondary to bladder mass, unable to place a ureteral stent, post percutaneous nephrostomy tube placementDiabetes with associated diabetic kidney disease  5  Liver transplant currently on tacrolimus, Prograf level  1 9, recommend repeat levels given fluctuant readings  6  Anemia of chronic kidney disease hemoglobin currently 8 3     PLAN:  · Renal function overall appears stable  · Status post right percutaneous nephrostomy tube  · Would continue with IV fluids  · Blood pressure appears stable  · No other changes from Nephrology standpoint    SUBJECTIVE:  Seen examined  Patient recently with right percutaneous nephrostomy tube placed some soreness  Otherwise doing well  Denies any chest pain or shortness of breath  Review of Systems    OBJECTIVE:  Current Weight:    Vitals:    05/28/22 1130 05/28/22 1145 05/28/22 1200 05/28/22 1231   BP: 127/74 136/73 151/71 126/76   Pulse: 76 80 68 85   Resp: 12 20 12    Temp:    98 °F (36 7 °C)   TempSrc:       SpO2: 93% 95% 98% 95%       Intake/Output Summary (Last 24 hours) at 5/28/2022 1318  Last data filed at 5/28/2022 1112  Gross per 24 hour   Intake 1325 83 ml   Output 665 ml   Net 660 83 ml       Physical Exam  Constitutional:       Appearance: He is not ill-appearing  HENT:      Head: Normocephalic and atraumatic     Eyes:      General: No scleral icterus  Cardiovascular:      Rate and Rhythm: Normal rate and regular rhythm  Pulmonary:      Effort: Pulmonary effort is normal       Breath sounds: Normal breath sounds  Abdominal:      General: There is no distension  Palpations: Abdomen is soft  Musculoskeletal:      Right lower leg: No edema  Left lower leg: No edema  Skin:     General: Skin is warm and dry  Neurological:      Mental Status: He is alert and oriented to person, place, and time           Medications:    Current Facility-Administered Medications:     acetaminophen (TYLENOL) tablet 650 mg, 650 mg, Oral, Q6H PRN, Adele Carolina MD, 650 mg at 05/27/22 0117    albuterol inhalation solution 2 5 mg, 2 5 mg, Nebulization, Q4H PRN, Briana Burger CRNA    belladonna-opium (B&O SUPPOSITORY) 16 2-30 mg suppository 1 suppository, 30 mg, Rectal, Q8H PRN, Adele Carolina MD, 1 suppository at 05/25/22 2128    cefepime (MAXIPIME) 1,000 mg in dextrose 5 % 50 mL IVPB, 1,000 mg, Intravenous, Q12H, Patrick Hobbs DO, Last Rate: 100 mL/hr at 05/27/22 2213, 1,000 mg at 05/28/22 1000    fentaNYL (SUBLIMAZE) injection 25 mcg, 25 mcg, Intravenous, Q5 Min PRN, Briana Burger CRNA    HYDROmorphone (DILAUDID) injection 0 5 mg, 0 5 mg, Intravenous, Q4H PRN, Adele Carolina MD, 0 5 mg at 05/27/22 2219    HYDROmorphone (DILAUDID) tablet 2 mg, 2 mg, Oral, Q4H PRN, Adele Carolina MD, 2 mg at 05/28/22 0541    HYDROmorphone (DILAUDID) tablet 4 mg, 4 mg, Oral, Q4H PRN, Adele Carolina MD, 4 mg at 05/27/22 2059    insulin detemir (LEVEMIR) subcutaneous injection 6 Units, 6 Units, Subcutaneous, HS, Aime Coleman MD, 6 Units at 05/27/22 2116    insulin lispro (HumaLOG) 100 units/mL subcutaneous injection 1-5 Units, 1-5 Units, Subcutaneous, TID AC, 1 Units at 05/28/22 0607 **AND** Fingerstick Glucose (POCT), , , TID AC, Adele Carolina MD    insulin lispro (HumaLOG) 100 units/mL subcutaneous injection 1-5 Units, 1-5 Units, Subcutaneous, HS, Cassie Russell MD, 1 Units at 05/27/22 2115    ipratropium (ATROVENT) 0 02 % inhalation solution 0 5 mg, 0 5 mg, Nebulization, Once PRN, Briana Burger CRNA    multi-electrolyte (PLASMALYTE-A/ISOLYTE-S PH 7 4) IV solution, 75 mL/hr, Intravenous, Continuous, Cassie Russell MD, Stopped at 05/28/22 1000    ondansetron (ZOFRAN) injection 4 mg, 4 mg, Intravenous, Q6H PRN, Cassie Russell MD, 4 mg at 05/28/22 1013    oxybutynin (DITROPAN) tablet 5 mg, 5 mg, Oral, TID PRN, Cassie Russell MD, 5 mg at 05/28/22 0904    pregabalin (LYRICA) capsule 50 mg, 50 mg, Oral, TID, Cassie Russell MD, 50 mg at 05/28/22 0904    sodium chloride 0 9 % infusion, 100 mL/hr, Intravenous, Continuous, Briana Burger CRNA, Last Rate: 100 mL/hr at 05/28/22 1117, 100 mL/hr at 05/28/22 1117    tacrolimus (PROGRAF) capsule 1 mg, 1 mg, Oral, Q12H, Cassie Russell MD, 1 mg at 05/28/22 0904    tamsulosin (FLOMAX) capsule 0 4 mg, 0 4 mg, Oral, Daily With Dinner, Cassie Russell MD, 0 4 mg at 05/27/22 1608    temazepam (RESTORIL) capsule 30 mg, 30 mg, Oral, HS, Cassie Russell MD, 30 mg at 05/27/22 2115    zolpidem (AMBIEN) tablet 10 mg, 10 mg, Oral, HS, Cassie Russell MD, 10 mg at 05/27/22 2115    Laboratory Results:  Results from last 7 days   Lab Units 05/28/22  0537 05/27/22  2121 05/27/22  0502 05/27/22  0247 05/26/22  0522 05/25/22  1103 05/23/22  1804 05/23/22  0638 05/23/22  0543   WBC Thousand/uL 8 15  --  7 73 8 78 9 19 14 54*  --   --  7 59   HEMOGLOBIN g/dL 8 3* 8 0* 7 1* 7 3* 8 0* 9 0* 10 4*  --  9 7*   HEMATOCRIT % 26 1* 25 2* 23 2* 23 8* 26 0* 28 5* 33 2*  --  31 7*   PLATELETS Thousands/uL 92*  --  80* 93* 101* 104*  --   --  162   POTASSIUM mmol/L 4 3  --  4 3  --  4 3 4 7  --  4 5 6 0*   CHLORIDE mmol/L 107  --  108  --  109* 107  --   --  105   CO2 mmol/L 29  --  25  --  26 27  --   --  29   BUN mg/dL 37*  --  40*  --  42* 40*  --   --  42* CREATININE mg/dL 2 81*  --  2 73*  --  3 28* 3 08*  --   --  2 94*   CALCIUM mg/dL 8 3  --  8 0*  --  8 4 9 1  --   --  9 0

## 2022-05-28 NOTE — PROGRESS NOTES
UROLOGY PROGRESS NOTE   Patient Identifiers: Aster Galvez (MRN [id number])  Date of Service: 5/28/2022        Assessment:     1  Newly diagnosed bladder tumor  - status post extensive transurethral resection by Dr Lawrence Bloom    2 Right hydroureteronephrosis secondary to the above   - now status post successful right nephrostomy tube placement           Plan:   -continue current Webb catheter  -continue daily labs  -we will follow for hopeful improvement in renal function  - recommend case management consult for home health as the patient will be discharged with nephrostomy tube to gravity drainage  -urology will continue to follow along  - patient will ultimately follow up with Dr Lawrence Bloom following discharge to review his surgical pathology and for additional oncologic planning      Subjective:     24 HR EVENTS:   PCN placed successfully this morning      Patient has  no complaints        Objective:     VITALS:    Vitals:    05/28/22 1520   BP: 119/67   Pulse: 84   Resp:    Temp: 98 2 °F (36 8 °C)   SpO2: 92%       INS & OUTS:  [unfilled]    LABS:  Lab Results   Component Value Date    HGB 8 3 (L) 05/28/2022    HCT 26 1 (L) 05/28/2022    WBC 8 15 05/28/2022    PLT 92 (L) 05/28/2022   ]    Lab Results   Component Value Date     12/17/2015    K 4 3 05/28/2022     05/28/2022    CO2 29 05/28/2022    BUN 37 (H) 05/28/2022    CREATININE 2 81 (H) 05/28/2022    CALCIUM 8 3 05/28/2022    GLUCOSE 145 (H) 05/23/2021   ]    INPATIENT MEDS:    Current Facility-Administered Medications:     acetaminophen (TYLENOL) tablet 650 mg, 650 mg, Oral, Q6H PRN, Violeta Matthews MD, 650 mg at 05/27/22 0117    belladonna-opium (B&O SUPPOSITORY) 16 2-30 mg suppository 1 suppository, 30 mg, Rectal, Q8H PRN, Violeta Matthews MD, 1 suppository at 05/25/22 2128    cefepime (MAXIPIME) 1,000 mg in dextrose 5 % 50 mL IVPB, 1,000 mg, Intravenous, Q12H, Patrick Hobbs DO, Last Rate: 100 mL/hr at 05/27/22 3186, 1,000 mg at 05/28/22 1000    fentaNYL (SUBLIMAZE) injection 25 mcg, 25 mcg, Intravenous, Q5 Min PRN, Briana Burger CRNA    HYDROmorphone (DILAUDID) injection 0 5 mg, 0 5 mg, Intravenous, Q4H PRN, Martha Darnell MD, 0 5 mg at 05/27/22 2219    HYDROmorphone (DILAUDID) tablet 2 mg, 2 mg, Oral, Q4H PRN, Martha Darnell MD, 2 mg at 05/28/22 1451    HYDROmorphone (DILAUDID) tablet 4 mg, 4 mg, Oral, Q4H PRN, Martha Darnell MD, 4 mg at 05/27/22 2059    insulin detemir (LEVEMIR) subcutaneous injection 6 Units, 6 Units, Subcutaneous, HS, Crissy Moon MD, 6 Units at 05/27/22 2116    insulin lispro (HumaLOG) 100 units/mL subcutaneous injection 1-5 Units, 1-5 Units, Subcutaneous, TID AC, 1 Units at 05/28/22 0607 **AND** Fingerstick Glucose (POCT), , , TID AC, Martha Darnell MD    insulin lispro (HumaLOG) 100 units/mL subcutaneous injection 1-5 Units, 1-5 Units, Subcutaneous, HS, Martha Darnell MD, 1 Units at 05/27/22 2115    ipratropium (ATROVENT) 0 02 % inhalation solution 0 5 mg, 0 5 mg, Nebulization, Once PRN, Briana Burger CRNA    multi-electrolyte (PLASMALYTE-A/ISOLYTE-S PH 7 4) IV solution, 75 mL/hr, Intravenous, Continuous, Martha Darnell MD, Last Rate: 75 mL/hr at 05/28/22 1453, 75 mL/hr at 05/28/22 1453    ondansetron (ZOFRAN) injection 4 mg, 4 mg, Intravenous, Q6H PRN, Martha Darnell MD, 4 mg at 05/28/22 1013    oxybutynin (DITROPAN) tablet 5 mg, 5 mg, Oral, TID PRN, Martha Darnell MD, 5 mg at 05/28/22 0904    pregabalin (LYRICA) capsule 50 mg, 50 mg, Oral, TID, Laverle Meth, MD, 50 mg at 05/28/22 0904    tacrolimus (PROGRAF) capsule 1 mg, 1 mg, Oral, Q12H, Laverle Meth, MD, 1 mg at 05/28/22 0904    tamsulosin (FLOMAX) capsule 0 4 mg, 0 4 mg, Oral, Daily With Dinner, Martha Meth, MD, 0 4 mg at 05/27/22 1608    temazepam (RESTORIL) capsule 30 mg, 30 mg, Oral, HS, Nathanielerle Meth, MD, 30 mg at 05/27/22 8094   zolpidem (AMBIEN) tablet 10 mg, 10 mg, Oral, HS, Franchesca Mena MD, 10 mg at 05/27/22 8976      Physical Exam:   /67   Pulse 84   Temp 98 2 °F (36 8 °C)   Resp 12   SpO2 92%   GEN: resting comfortably  RESP: breathing comfortably with no accessory muscle use  CV: no significant peripheral edema  ABD: soft and appropriately tender to palpation  INCISION: none  DRAINS: Draining clear urine  CHICAS: in place draining clear yellow urine

## 2022-05-28 NOTE — PROGRESS NOTES
Progress Note - Infectious Disease   Claudine Quezada 74 y o  male MRN: [id number]  Unit/Bed#: -01 Encounter: [id number]      Impression:  1  R/0 postop urinary tract infection  2  Bladder tumor R/0  Neoplasm S/P biopsy POD 2 S/P right PCN POD 0  3  History of alcoholic cirrhosis, hepatitis-C  S/P liver transplant (approximately 20 years ago)  4  Type 2 DM with CKD stage 4  5  History of CVA S/P left frontotemporal craniotomy    Recommendations:  Patient had a T-max of a 100 7° earlier and is currently afebrile  WBC count is WNL  1  Had right PCN placed by IR today   2  Follow serial creatinine which is currently 2 81  3  Check final blood and urine culture results which are negative so far  4  Pending above continue cefepime 1 g q 12 hours IV    Antibiotics:  1  Cefepime 1 g q 12 hours IV, day 3 total antibiotics   Subjective: The patient has no complaints  Denies fevers, chills, or sweats  Denies nausea, vomiting, or diarrhea  Objective:  Vitals:  Temp:  [98 °F (36 7 °C)-100 7 °F (38 2 °C)] 98 2 °F (36 8 °C)  HR:  [68-92] 84  Resp:  [12-20] 12  BP: (116-151)/(65-76) 119/67  SpO2:  [84 %-100 %] 92 %  Temp (24hrs), Av 8 °F (37 1 °C), Min:98 °F (36 7 °C), Max:100 7 °F (38 2 °C)  Current: Temperature: 98 2 °F (36 8 °C)    Physical Exam:     General Appearance:  Eyes:   Alert, nontoxic, no acute distress  Conjunctiva pale   Throat: Oropharynx moist without lesions  Lips, mucosa, and tongue normal   Neck: Supple, symmetrical, trachea midline, no adenopathy,  no tenderness/mass/nodules   Lungs:   Clear to auscultation bilaterally, no audible wheezes, rhonchi or rales; respirations unlabored   Heart:  Regular rate and rhythm, S1, S2 normal, no murmur, rub or gallop   Abdomen:   Soft, non-tender, surgical scars, non-distended, positive bowel sounds    No masses, no organomegaly    No CVA tenderness, Webb catheter with clear urine, right PCN in place   Extremities: Extremities normal, atraumatic, no clubbing, cyanosis or edema   Skin: Skin color pale, surgical scars, tattoos, as above         Invasive Devices  Report    Peripherally Inserted Central Catheter Line  Duration           PICC Line 05/24/22 Right Brachial 4 days          Drain  Duration           Continuous Bladder Irrigation Three-way 2 days    Nephrostomy Right 8 5 Fr  <1 day                Labs, Imaging, & Other studies:   All pertinent labs were personally reviewed  Results from last 7 days   Lab Units 05/28/22  0537 05/27/22  2121 05/27/22  0502 05/27/22  0247   WBC Thousand/uL 8 15  --  7 73 8 78   HEMOGLOBIN g/dL 8 3* 8 0* 7 1* 7 3*   PLATELETS Thousands/uL 92*  --  80* 93*     Results from last 7 days   Lab Units 05/28/22  0537 05/27/22  0502 05/26/22  0522 05/23/22  0638 05/23/22  0543   SODIUM mmol/L 139 139 142   < > 134*   POTASSIUM mmol/L 4 3 4 3 4 3   < > 6 0*   CHLORIDE mmol/L 107 108 109*   < > 105   CO2 mmol/L 29 25 26   < > 29   BUN mg/dL 37* 40* 42*   < > 42*   CREATININE mg/dL 2 81* 2 73* 3 28*   < > 2 94*   EGFR ml/min/1 73sq m 21 21 17   < > 20   CALCIUM mg/dL 8 3 8 0* 8 4   < > 9 0   AST U/L  --  18  --   --  57*   ALT U/L  --  9*  --   --  25   ALK PHOS U/L  --  57  --   --  85    < > = values in this interval not displayed  Results from last 7 days   Lab Units 05/27/22  1003 05/23/22  0612   BLOOD CULTURE  No Growth at 24 hrs    No Growth at 24 hrs   --    URINE CULTURE   --  No Growth <1000 cfu/mL

## 2022-05-28 NOTE — RESPIRATORY THERAPY NOTE
RT Protocol Note  Kylah Tucker 74 y o  male MRN: [id number]  Unit/Bed#: -01 Encounter: [id number]    Assessment    Principal Problem:     Mass of bladder  Active Problems:    History of CVA (cerebrovascular accident)    Liver transplant status ([place] [address] )    Stage 4 chronic kidney disease (HCC)    Type 2 diabetes mellitus with diabetic peripheral angiopathy without gangrene, with long-term current use of insulin (HCC)    Gross hematuria    Hydronephrosis of right kidney    Problem with vascular access    Anemia    Fever      Home Pulmonary Medications:         Past Medical History:   Diagnosis Date    Alcoholism ([place] [address] )     Cerebral artery aneurysm     Change in mental state     last assessed 5/18/15; resolved 10/27/15    Diabetes mellitus ([place] [address] )     Drug dependence ([place] [address] )     Fatigue     last assessed 1/26/15; resolved 5/24/16    Hepatitis C     Hospital discharge follow-up 12/21/2018    Kidney disease     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[place] [address] )     Liver transplant recipient [facility])     Renal disorder     Subdural hygroma     2/27/14; resolved 7/28/15    Thrombocytopenia ([place] [address] ) 9/20/2017     Social History     Socioeconomic History    Marital status: /Civil Union     Spouse name: None    Number of children: None    Years of education: less then high school    Highest education level: None   Occupational History    Occupation: physical disability    Tobacco Use    Smoking status: Never Smoker    Smokeless tobacco: Never Used    Tobacco comment: former smoker per [employer]    Vaping Use    Vaping Use: Never used   Substance and Sexual Activity    Alcohol use: Not Currently    Drug use: No     Comment: remotely quit drug use per [employer]     Sexual activity: Never   Other Topics Concern    None   Social History Narrative    Caffeine use     Living with significant other , girlfriend and daughter      Social Determinants of Health     Financial Resource Strain: Not on file   Food Insecurity: No Food Insecurity Worried About [address] in the Last Year: Never true    [address] in the Last Year: Never true   Transportation Needs: Unknown    Lack of Transportation (Medical): Not on file    Lack of Transportation (Non-Medical): No   Physical Activity: Not on file   Stress: Not on file   Social Connections: Not on file   Intimate Partner Violence: Not on file   Housing Stability: Low Risk     Unable to Pay for Housing in the Last Year: No    Number of Places Lived in the Last Year: 1    Unstable Housing in the Last Year: No       Subjective         Objective    Physical Exam:   Assessment Type: (P) Assess only  General Appearance: (P) Alert, Awake  Respiratory Pattern: (P) Normal  Chest Assessment: (P) Chest expansion symmetrical  Bilateral Breath Sounds: (P) Clear    Vitals:  Blood pressure 126/76, pulse 85, temperature 98 °F (36 7 °C), resp  rate 12, SpO2 95 %  Imaging and other studies: I have personally reviewed pertinent reports  Plan    Respiratory Plan: (P) Discontinue Protocol        Resp Comments: (P) post surgical pt with no Pulmonary Hx or home resp meds   albuterol PRN d/C's at this time no further intervention is required

## 2022-05-28 NOTE — ANESTHESIA PREPROCEDURE EVALUATION
Procedure:  IR NEPHROSTOMY TUBE PLACEMENT    Relevant Problems   ANESTHESIA (within normal limits)      CARDIO   (+) Hyperlipidemia   (+) Migraine headache   (+) Type 2 diabetes mellitus with diabetic peripheral angiopathy without gangrene, with long-term current use of insulin (HCC)      ENDO   (+) Type 2 diabetes mellitus with chronic kidney disease (HCC)   (+) Type 2 diabetes mellitus with diabetic peripheral angiopathy without gangrene, with long-term current use of insulin (HCC)      GI/HEPATIC   (+) 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HCC)      /RENAL   (+) CKD (chronic kidney disease)   (+) Hydronephrosis of right kidney   (+) Stage 4 chronic kidney disease (HCC)      HEMATOLOGY   (+) Anemia   (+) Thrombocytopenia (HCC)      MUSCULOSKELETAL   (+) Spondylosis of cervical region without myelopathy or radiculopathy      NEURO/PSYCH   (+) Headache, chronic daily   (+) History of CVA (cerebrovascular accident)   (+) History of cirrhosis   (+) History of hepatitis C   (+) Migraine headache      Digestive   (+) Liver transplant status ([facility] [address] )      Other   (+) Mass of bladder   (+) Mild cognitive impairment       12/2018  LEFT VENTRICLE: Size was normal  Systolic function was normal  Ejection fraction was estimated to be 65 %  There were no regional wall motion abnormalities  Wall thickness was normal  There was no evidence of concentric hypertrophy  DOPPLER: Doppler parameters were consistent with abnormal left ventricular relaxation (grade 1 diastolic dysfunction)      RIGHT VENTRICLE: The size was normal  Systolic function was normal  Wall thickness was normal      LEFT ATRIUM: Size was normal      RIGHT ATRIUM: Size was normal      MITRAL VALVE: Valve structure was normal  There was normal leaflet separation  DOPPLER: The transmitral velocity was within the normal range  There was no evidence for stenosis  There was no significant regurgitation      AORTIC VALVE: The valve was trileaflet   Leaflets exhibited normal thickness and normal cuspal separation  DOPPLER: Transaortic velocity was within the normal range  There was no evidence for stenosis  There was no significant  regurgitation      TRICUSPID VALVE: The valve structure was normal  There was normal leaflet separation  DOPPLER: The transtricuspid velocity was within the normal range  There was no evidence for stenosis  There was no significant regurgitation      PULMONIC VALVE: Leaflets exhibited normal thickness, no calcification, and normal cuspal separation  DOPPLER: The transpulmonic velocity was within the normal range  There was trace regurgitation      PERICARDIUM: There was no pericardial effusion  The pericardium was normal in appearance      AORTA: The root exhibited normal size      SYSTEMIC VEINS: IVC: The inferior vena cava was normal in size  Physical Exam    Airway    Mallampati score: II  TM Distance: >3 FB  Neck ROM: full     Dental   upper dentures and lower dentures,     Cardiovascular  Rhythm: regular, Rate: normal, Cardiovascular exam normal    Pulmonary  Pulmonary exam normal Breath sounds clear to auscultation,     Other Findings        Anesthesia Plan  ASA Score- 4 Emergent    Anesthesia Type- general with ASA Monitors  Additional Monitors:   Airway Plan: ETT  Plan Factors-Exercise tolerance (METS): >4 METS  Chart reviewed  EKG reviewed  Existing labs reviewed  Patient summary reviewed  Patient is not a current smoker  Induction- intravenous  Postoperative Plan- Plan for postoperative opioid use  Planned trial extubation    Informed Consent- Anesthetic plan and risks discussed with patient  I personally reviewed this patient with the CRNA  Discussed and agreed on the Anesthesia Plan with the CRNA  Morenita Kingsley

## 2022-05-28 NOTE — PROGRESS NOTES
INTERNAL MEDICINE RESIDENCY PROGRESS NOTE     Name: Porfirio Murphy   Age & Sex: 74 y o  male   MRN: [id number]  Unit/Bed#: -01   Encounter: [id number]  Team: SLIM    PATIENT INFORMATION     Name: Porfirio Murphy   Age & Sex: 74 y o  male   MRN: [id number]  Hospital Stay Days: 5    ASSESSMENT/PLAN     Principal Problem:     Mass of bladder  Active Problems:    History of CVA (cerebrovascular accident)    [facility] )    Stage 4 chronic kidney disease (HCC)    Type 2 diabetes mellitus with diabetic peripheral angiopathy without gangrene, with long-term current use of insulin (HCC)    Gross hematuria    Hydronephrosis of right kidney    Problem with vascular access    Anemia    Fever      Fever  Assessment & Plan  Febrile, hx liver transplant, cystoscopy 05/26/2022, large bladder mass resection/biopsy, right-sided hydronephrosis still present  Urinalysis/UC unremarkable on admission, WBC stable, elevated procalcitonin  Etiology likely post surgical procedure translocation vs right-sided hydronephrosis  Chest x-ray imaging unremarkable for consolidation/effusion, UA positive WBC, elevated procalcitonin  Continue cefepime, dosing per renal adjustment, follow blood culture data  Infectious disease consult, appreciate recommendations  Plan for right sided nephrostomy tube placement secondary to hydronephrosis, source control    Anemia  Assessment & Plan  Hemoglobin trending down postprocedure  Transfuse 1 unit PRBCs 05/27/2022, leukoreduced secondary to history liver transplant  Daily H/H stable, gross hematuria improvement, hold AC/AP given procedure    Problem with vascular access  Assessment & Plan  · Status post R brachial PICC placement on 5/24    Hydronephrosis of right kidney  Assessment & Plan  CT AP: Persistent moderate right-sided hydroureteronephrosis without a discrete ureteral calculus  Agatha webber however a large bladder mass now seen which may reflect hematoma and/or neoplasm  Agatha Orosco is possible soft tissue density extending into the right ureterovesical junction     · Urine culture with no growth  · 3-way Webb catheter placed on 5/23   · Cystoscopy completed 05/26/2022 unable to stent hydronephrosis secondary to mass  · Nephrology consulted, repeat kidney/bladder ultrasound showing moderate/severe right-sided hydronephrosis  · Plan for right nephrostomy tube placement with IR 05/28/2022    Gross hematuria  Assessment & Plan  Gross hematuria as noted by patient with clots on admission  · Urine culture negative  · CT AP with right sided hydro and bladder mass concern for hematoma and/or neoplasm  · S/P Webb catheter insertion 5/23, no hematuria on exam now  · Urology following; Cystoscopy 05/26/22 shows 5 cm mass, unable to stent right-sided hydronephrosis with mass blocking right ureteral orifice  Await mass biopsies for definitive management    · Plan for IR right-sided nephrostomy tube secondary to right kidney hydronephrosis  · Hold AP/AC    Type 2 diabetes mellitus with diabetic peripheral angiopathy without gangrene, with long-term current use of insulin [facility])  Assessment & Plan  Lab Results   Component Value Date    HGBA1C 7 2 (H) 02/10/2022       Recent Labs     05/25/22  2113 05/26/22  0611 05/26/22  0624 05/26/22  0849   POCGLU 172* 45* 294* 92       Blood Sugar Average: Last 72 hrs:  · Decrease Levemir 6 qhs q h s , blood glucose goal 140-180 while inpatient  · Correctional insulin  · Carb controlled diet  · Initiate hypoglycemia protocol  · Continue home dose Lyrica for neuropathy    Stage 4 chronic kidney disease [facility])  Assessment & Plan  Lab Results   Component Value Date    EGFR 17 05/26/2022    EGFR 18 05/25/2022    EGFR 20 05/23/2022    CREATININE 3 28 (H) 05/26/2022    CREATININE 3 08 (H) 05/25/2022    CREATININE 2 94 (H) 05/23/2022   · Follows with [facility] Nephrology  · Creatinine currently at baseline of approximately 2 9  · [facility] transplant for potential renal transplant in future  · Avoid nephrotoxins and hypotension   · Monitor BMP daily  · Nephrology consulted, appreciate recommendations  · Continue maintenance IV fluids, kidney/bladder ultrasound show moderate/severe right-sided hydronephrosis    L[facility] status ([facility] )  Assessment & Plan  · History of liver transplant in , maintained on Tacrolimus managed by [facility] transplant  · Continue home dose of Prograf 1 mg q 12 hours  · Tacrolimus level pending    History of CVA (cerebrovascular accident)  Assessment & Plan  · History of prior CVA, currently without new focal deficits  · Plavix on hold given gross hematuria, resume when cleared with Urology     * Mass of bladder  Assessment & Plan  CT abdomen/pelvis with contrast; large bladder mass which measures approximately 7 4 x 6 6 cm  Possible soft tissue mass extending into the right ureterovesical junction  2022 Cystoscopy with Urology; findings include 5 cm mass, unable to stent right-sided hydronephrosis with mass blocking right ureteral orifice  Follow biopsy results for more definitive management  Obtain PSA, Urology following, gross hematuria resolved after procedure      Disposition:  Right-sided nephrostomy tube placement with IR scheduled for today  Family updated over the phone, all questions answered  SUBJECTIVE     Patient seen and examined  No acute events overnight  Patient notes that he is feeling slightly better compared to yesterday, he understands he will be going for IR procedure today  Denies fever/chills, nausea/vomiting/diarrhea, shortness of breath, chest pain, cough, dysuria      OBJECTIVE     Vitals:    22 1638 22 1900 22 2228 22 0711   BP:  142/72 116/65 122/69   Pulse: 83 93 92 87   Resp:  16     Temp: 98 4 °F (36 9 °C) 99 6 °F (37 6 °C)  (!) 100 7 °F (38 2 °C)   TempSrc:       SpO2: 96%  (!) 84% 96%      Temperature:   Temp (24hrs), Av 2 °F (37 3 °C), Min:98 4 °F (36 9 °C), Max:100 7 °F (38 2 °C)    Temperature: (!) 100 7 °F (38 2 °C)  Intake & Output:  I/O       05/26 0701 05/27 0700 05/27 0701 05/28 0700 05/28 0701 05/29 0700    P  O  240 700     I V  900      Blood  345 8     Total Intake 1140 1045 8     Urine 2150 650     Blood 10      Total Output 2160 650     Net -1020 +395 8                Weights: There is no height or weight on file to calculate BMI  Weight (last 2 days)     None        Physical Exam  Constitutional:       General: He is not in acute distress  Appearance: He is normal weight  HENT:      Head: Normocephalic and atraumatic  Eyes:      General: No scleral icterus  Pupils: Pupils are equal, round, and reactive to light  Cardiovascular:      Rate and Rhythm: Normal rate and regular rhythm  Pulses: Normal pulses  Pulmonary:      Effort: Pulmonary effort is normal  No respiratory distress  Breath sounds: No wheezing, rhonchi or rales  Abdominal:      General: Bowel sounds are normal  There is no distension  Tenderness: There is no abdominal tenderness  There is no rebound  Genitourinary:     Comments: Webb in place, clear urine  Musculoskeletal:         General: No swelling  Normal range of motion  Skin:     General: Skin is warm and dry  Capillary Refill: Capillary refill takes less than 2 seconds  Neurological:      General: No focal deficit present  Mental Status: He is alert and oriented to person, place, and time  Mental status is at baseline  Psychiatric:         Mood and Affect: Mood normal          Behavior: Behavior normal        LABORATORY DATA     Labs: I have personally reviewed pertinent reports    Results from last 7 days   Lab Units 05/28/22  0537 05/27/22  2121 05/27/22  0502 05/27/22  0247   WBC Thousand/uL 8 15  --  7 73 8 78   HEMOGLOBIN g/dL 8 3* 8 0* 7 1* 7 3*   HEMATOCRIT % 26 1* 25 2* 23 2* 23 8*   PLATELETS Thousands/uL 92*  --  80* 93*   NEUTROS PCT % 55  --  67 69   MONOS PCT % 9  --  8 7 Results from last 7 days   Lab Units 05/28/22  0537 05/27/22  0502 05/26/22  0522 05/23/22  8281 05/23/22  0543   POTASSIUM mmol/L 4 3 4 3 4 3   < > 6 0*   CHLORIDE mmol/L 107 108 109*   < > 105   CO2 mmol/L 29 25 26   < > 29   BUN mg/dL 37* 40* 42*   < > 42*   CREATININE mg/dL 2 81* 2 73* 3 28*   < > 2 94*   CALCIUM mg/dL 8 3 8 0* 8 4   < > 9 0   ALK PHOS U/L  --  57  --   --  85   ALT U/L  --  9*  --   --  25   AST U/L  --  18  --   --  57*    < > = values in this interval not displayed  Results from last 7 days   Lab Units 05/23/22  0543   INR  1 10     Results from last 7 days   Lab Units 05/27/22  0247   LACTIC ACID mmol/L 0 5           IMAGING & DIAGNOSTIC TESTING     Radiology Results: I have personally reviewed pertinent reports  CT abdomen pelvis wo contrast    Result Date: 5/23/2022  Impression: Persistent moderate right-sided hydroureteronephrosis without a discrete ureteral calculus  There is however a large bladder mass now seen which may reflect hematoma and/or neoplasm  There is possible soft tissue density extending into the right ureterovesical junction  This can be further evaluated with cystoscopy  Workstation performed: [id number]     XR chest portable    Result Date: 5/27/2022  Impression: Mild bibasilar atelectasis with nothing to suggest pneumonia  Workstation performed: [id number]     US kidney and bladder    Result Date: 5/27/2022  Impression: Mostly unchanged moderate to severe right kidney hydronephrosis with cortical thinning  Workstation performed: [id number]     Other Diagnostic Testing: I have personally reviewed pertinent reports      ACTIVE MEDICATIONS     Current Facility-Administered Medications   Medication Dose Route Frequency    acetaminophen (TYLENOL) tablet 650 mg  650 mg Oral Q6H PRN    belladonna-opium (B&O SUPPOSITORY) 16 2-30 mg suppository 1 suppository  30 mg Rectal Q8H PRN    cefepime (MAXIPIME) 1,000 mg in dextrose 5 % 50 mL IVPB  1,000 mg Intravenous Q12H    diphenhydrAMINE (BENADRYL) injection   Intravenous Code/Trauma/Sedation Med    hydrocortisone sodium succinate (PF) (Solu-CORTEF) injection    Code/Trauma/Sedation Med    HYDROmorphone (DILAUDID) injection 0 5 mg  0 5 mg Intravenous Q4H PRN    HYDROmorphone (DILAUDID) tablet 2 mg  2 mg Oral Q4H PRN    HYDROmorphone (DILAUDID) tablet 4 mg  4 mg Oral Q4H PRN    insulin detemir (LEVEMIR) subcutaneous injection 6 Units  6 Units Subcutaneous HS    insulin lispro (HumaLOG) 100 units/mL subcutaneous injection 1-5 Units  1-5 Units Subcutaneous TID AC    insulin lispro (HumaLOG) 100 units/mL subcutaneous injection 1-5 Units  1-5 Units Subcutaneous HS    lidocaine 1% buffered   Infiltration Code/Trauma/Sedation Med    multi-electrolyte (PLASMALYTE-A/ISOLYTE-S PH 7 4) IV solution  75 mL/hr Intravenous Continuous    ondansetron (ZOFRAN) injection 4 mg  4 mg Intravenous Q6H PRN    oxybutynin (DITROPAN) tablet 5 mg  5 mg Oral TID PRN    pregabalin (LYRICA) capsule 50 mg  50 mg Oral TID    tacrolimus (PROGRAF) capsule 1 mg  1 mg Oral Q12H    tamsulosin (FLOMAX) capsule 0 4 mg  0 4 mg Oral Daily With Dinner    temazepam (RESTORIL) capsule 30 mg  30 mg Oral HS    zolpidem (AMBIEN) tablet 10 mg  10 mg Oral HS     Facility-Administered Medications Ordered in Other Encounters   Medication Dose Route Frequency    dexamethasone (PF) (DECADRON) injection   Intravenous PRN    fentanyl citrate (PF) 100 MCG/2ML   Intravenous PRN    lidocaine (PF) (XYLOCAINE-MPF) 1 % injection   Intravenous PRN    norepinephrine (LEVOPHED) bolus from bag   Intravenous PRN    propofol (DIPRIVAN) 200 MG/20ML bolus injection   Intravenous PRN    ROCuronium (ZEMURON) injection   Intravenous PRN    sodium chloride 0 9 % infusion   Intravenous Continuous PRN    Sugammadex Sodium (BRIDION)   Intravenous PRN       VTE Pharmacologic Prophylaxis: Sequential compression device (Venodyne)   VTE Mechanical Prophylaxis: sequential compression device    Portions of the record may have been created with voice recognition software  Occasional wrong word or "sound a like" substitutions may have occurred due to the inherent limitations of voice recognition software    Read the chart carefully and recognize, using context, where substitutions have occurred   ==  Giselle Gann, [facility]  Internal Medicine Residency PGY-3

## 2022-05-28 NOTE — PROGRESS NOTES
Patient arrived to IR for R PCN placement  /69   Pulse 87   Temp (!) 100 7 °F (38 2 °C)   Resp 16   SpO2 96%     The procedure and risks were discussed with the patient  All questions were answered  Informed consent was obtained

## 2022-05-28 NOTE — PLAN OF CARE
Problem: MOBILITY - ADULT  Goal: Maintain or return to baseline ADL function  Description: INTERVENTIONS:  -  Assess patient's ability to carry out ADLs; assess patient's baseline for ADL function and identify physical deficits which impact ability to perform ADLs (bathing, care of mouth/teeth, toileting, grooming, dressing, etc )  - Assess/evaluate cause of self-care deficits   - Assess range of motion  - Assess patient's mobility; develop plan if impaired  - Assess patient's need for assistive devices and provide as appropriate  - Encourage maximum independence but intervene and supervise when necessary  - Involve family in performance of ADLs  - Assess for home care needs following discharge   - Consider OT consult to assist with ADL evaluation and planning for discharge  - Provide patient education as appropriate  Outcome: Progressing  Goal: Maintains/Returns to pre admission functional level  Description: INTERVENTIONS:  - Perform BMAT or MOVE assessment daily    - Set and communicate daily mobility goal to care team and patient/family/caregiver     - Collaborate with rehabilitation services on mobility goals if consulted  - Out of bed for toileting  - Record patient progress and toleration of activity level   Outcome: Progressing     Problem: CARDIOVASCULAR - ADULT  Goal: Maintains optimal cardiac output and hemodynamic stability  Description: INTERVENTIONS:  - Monitor I/O, vital signs and rhythm  - Monitor for S/S and trends of decreased cardiac output  - Administer and titrate ordered vasoactive medications to optimize hemodynamic stability  - Assess quality of pulses, skin color and temperature  - Assess for signs of decreased coronary artery perfusion  - Instruct patient to report change in severity of symptoms  Outcome: Progressing  Goal: Absence of cardiac dysrhythmias or at baseline rhythm  Description: INTERVENTIONS:  - Continuous cardiac monitoring, vital signs, obtain 12 lead EKG if ordered  - Administer antiarrhythmic and heart rate control medications as ordered  - Monitor electrolytes and administer replacement therapy as ordered  Outcome: Progressing     Problem: RESPIRATORY - ADULT  Goal: Achieves optimal ventilation and oxygenation  Description: INTERVENTIONS:  - Assess for changes in respiratory status  - Assess for changes in mentation and behavior  - Position to facilitate oxygenation and minimize respiratory effort  - Oxygen administered by appropriate delivery if ordered  - Initiate smoking cessation education as indicated  - Encourage broncho-pulmonary hygiene including cough, deep breathe, Incentive Spirometry  - Assess the need for suctioning and aspirate as needed  - Assess and instruct to report SOB or any respiratory difficulty  - Respiratory Therapy support as indicated  Outcome: Progressing     Problem: GENITOURINARY - ADULT  Goal: Maintains or returns to baseline urinary function  Description: INTERVENTIONS:  - Assess urinary function  - Encourage oral fluids to ensure adequate hydration if ordered  - Administer IV fluids as ordered to ensure adequate hydration  - Administer ordered medications as needed  - Offer frequent toileting  - Follow urinary retention protocol if ordered  Outcome: Progressing  Goal: Absence of urinary retention  Description: INTERVENTIONS:  - Assess patients ability to void and empty bladder  - Monitor I/O  - Bladder scan as needed  - Discuss with physician/AP medications to alleviate retention as needed  - Discuss catheterization for long term situations as appropriate  Outcome: Progressing  Goal: Urinary catheter remains patent  Description: INTERVENTIONS:  - Assess patency of urinary catheter  - If patient has a chronic flores, consider changing catheter if non-functioning  - Follow guidelines for intermittent irrigation of non-functioning urinary catheter  Outcome: Progressing     Problem: MUSCULOSKELETAL - ADULT  Goal: Maintain or return mobility to safest level of function  Description: INTERVENTIONS:  - Assess patient's ability to carry out ADLs; assess patient's baseline for ADL function and identify physical deficits which impact ability to perform ADLs (bathing, care of mouth/teeth, toileting, grooming, dressing, etc )  - Assess/evaluate cause of self-care deficits   - Assess range of motion  - Assess patient's mobility  - Assess patient's need for assistive devices and provide as appropriate  - Encourage maximum independence but intervene and supervise when necessary  - Involve family in performance of ADLs  - Assess for home care needs following discharge   - Consider OT consult to assist with ADL evaluation and planning for discharge  - Provide patient education as appropriate  Outcome: Progressing  Goal: Maintain proper alignment of affected body part  Description: INTERVENTIONS:  - Support, maintain and protect limb and body alignment  - Provide patient/ family with appropriate education  Outcome: Progressing     Problem: Potential for Falls  Goal: Patient will remain free of falls  Description: INTERVENTIONS:  - Educate patient/family on patient safety including physical limitations  - Instruct patient to call for assistance with activity   - Consult OT/PT to assist with strengthening/mobility   - Keep Call bell within reach  - Keep bed low and locked with side rails adjusted as appropriate  - Keep care items and personal belongings within reach  - Initiate and maintain comfort rounds  - Make Fall Risk Sign visible to staff  - Apply yellow socks and bracelet for high fall risk patients  - Consider moving patient to room near nurses station  Outcome: Progressing     Problem: Knowledge Deficit  Goal: Patient/family/caregiver demonstrates understanding of disease process, treatment plan, medications, and discharge instructions  Description: Complete learning assessment and assess knowledge base    Interventions:  - Provide teaching at level of understanding  - Provide teaching via preferred learning methods  Outcome: Progressing

## 2022-05-28 NOTE — BRIEF OP NOTE (RAD/CATH)
IR NEPHROSTOMY TUBE PLACEMENT  Procedure Note    PATIENT NAME: Alexa Aranda  : 1950  MRN: [id number]     Pre-op Diagnosis:   1  Hematuria    2  Gross hematuria    3  [facility]    4  Hydronephrosis of right kidney    5  Lesion of bladder    6  Problem with vascular access    7  Stage 4 chronic kidney disease ([facility] )    8  Mass of bladder    9  Fever      Post-op Diagnosis:   1  Hematuria    2  Gross hematuria    3  Liver transplant status ([facility] )    4  Hydronephrosis of right kidney    5  Lesion of bladder    6  Problem with vascular access    7  Stage 4 chronic kidney disease ([facility] )    8  Mass of bladder    9  Fever        Surgeon:   Javon Russell DO  Assistants:     No qualified resident was available      Estimated Blood Loss: None  Findings:   · Severe R hydronephrosis  · Hematuria, unsure if present prior to procedure / or as a result of procedure  · 8F R PCN placed    Specimens: bloody urine    Complications:  none    Anesthesia: general    Javon Russell DO     Date: 2022  Time: 10:52 AM

## 2022-05-28 NOTE — SEDATION DOCUMENTATION
Right nephrostomy placed by Dr Jose Manuel Miranda  Anesthjuanito present for case  15 mL of bloody fluid removed and specimen sent to lab   IR Procedure Bedrest Start Time is 1051  Patient transported with [facility] CRNA  To PACU on pulse ox and report given

## 2022-05-29 PROBLEM — N18.9 ACUTE KIDNEY INJURY SUPERIMPOSED ON CKD (HCC): Status: ACTIVE | Noted: 2017-09-20

## 2022-05-29 PROBLEM — N17.9 ACUTE KIDNEY INJURY SUPERIMPOSED ON CKD (HCC): Status: ACTIVE | Noted: 2017-09-20

## 2022-05-29 PROBLEM — N17.9 ACUTE KIDNEY INJURY SUPERIMPOSED ON CKD: Status: ACTIVE | Noted: 2017-09-20

## 2022-05-29 PROBLEM — D49.4 BLADDER TUMOR: Status: ACTIVE | Noted: 2022-05-26

## 2022-05-29 PROBLEM — D62 ACUTE BLOOD LOSS ANEMIA: Status: ACTIVE | Noted: 2022-05-29

## 2022-05-29 PROBLEM — N18.9 ACUTE KIDNEY INJURY SUPERIMPOSED ON CKD: Status: ACTIVE | Noted: 2017-09-20

## 2022-05-29 LAB
ANION GAP SERPL CALCULATED.3IONS-SCNC: 5 MMOL/L (ref 4–13)
BASOPHILS # BLD AUTO: 0 THOUSANDS/ΜL (ref 0–0.1)
BASOPHILS NFR BLD AUTO: 0 % (ref 0–1)
BUN SERPL-MCNC: 39 MG/DL (ref 5–25)
CALCIUM SERPL-MCNC: 8.6 MG/DL (ref 8.3–10.1)
CHLORIDE SERPL-SCNC: 108 MMOL/L (ref 100–108)
CO2 SERPL-SCNC: 28 MMOL/L (ref 21–32)
CREAT SERPL-MCNC: 2.7 MG/DL (ref 0.6–1.3)
EOSINOPHIL # BLD AUTO: 0 THOUSAND/ΜL (ref 0–0.61)
EOSINOPHIL NFR BLD AUTO: 0 % (ref 0–6)
ERYTHROCYTE [DISTWIDTH] IN BLOOD BY AUTOMATED COUNT: 14.9 % (ref 11.6–15.1)
GFR SERPL CREATININE-BSD FRML MDRD: 22 ML/MIN/1.73SQ M
GLUCOSE SERPL-MCNC: 210 MG/DL (ref 65–140)
GLUCOSE SERPL-MCNC: 226 MG/DL (ref 65–140)
GLUCOSE SERPL-MCNC: 241 MG/DL (ref 65–140)
GLUCOSE SERPL-MCNC: 270 MG/DL (ref 65–140)
GLUCOSE SERPL-MCNC: 290 MG/DL (ref 65–140)
HCT VFR BLD AUTO: 27.2 % (ref 36.5–49.3)
HGB BLD-MCNC: 8.5 G/DL (ref 12–17)
IMM GRANULOCYTES # BLD AUTO: 0.05 THOUSAND/UL (ref 0–0.2)
IMM GRANULOCYTES NFR BLD AUTO: 1 % (ref 0–2)
LYMPHOCYTES # BLD AUTO: 0.79 THOUSANDS/ΜL (ref 0.6–4.47)
LYMPHOCYTES NFR BLD AUTO: 10 % (ref 14–44)
MCH RBC QN AUTO: 24.6 PG (ref 26.8–34.3)
MCHC RBC AUTO-ENTMCNC: 31.3 G/DL (ref 31.4–37.4)
MCV RBC AUTO: 79 FL (ref 82–98)
MONOCYTES # BLD AUTO: 0.38 THOUSAND/ΜL (ref 0.17–1.22)
MONOCYTES NFR BLD AUTO: 5 % (ref 4–12)
NEUTROPHILS # BLD AUTO: 7.03 THOUSANDS/ΜL (ref 1.85–7.62)
NEUTS SEG NFR BLD AUTO: 84 % (ref 43–75)
NRBC BLD AUTO-RTO: 0 /100 WBCS
PLATELET # BLD AUTO: 91 THOUSANDS/UL (ref 149–390)
PMV BLD AUTO: 12.5 FL (ref 8.9–12.7)
POTASSIUM SERPL-SCNC: 4.3 MMOL/L (ref 3.5–5.3)
RBC # BLD AUTO: 3.45 MILLION/UL (ref 3.88–5.62)
SODIUM SERPL-SCNC: 141 MMOL/L (ref 136–145)
WBC # BLD AUTO: 8.25 THOUSAND/UL (ref 4.31–10.16)

## 2022-05-29 PROCEDURE — 80048 BASIC METABOLIC PNL TOTAL CA: CPT | Performed by: STUDENT IN AN ORGANIZED HEALTH CARE EDUCATION/TRAINING PROGRAM

## 2022-05-29 PROCEDURE — 99232 SBSQ HOSP IP/OBS MODERATE 35: CPT | Performed by: INTERNAL MEDICINE

## 2022-05-29 PROCEDURE — 82948 REAGENT STRIP/BLOOD GLUCOSE: CPT

## 2022-05-29 PROCEDURE — 85025 COMPLETE CBC W/AUTO DIFF WBC: CPT | Performed by: STUDENT IN AN ORGANIZED HEALTH CARE EDUCATION/TRAINING PROGRAM

## 2022-05-29 PROCEDURE — 99232 SBSQ HOSP IP/OBS MODERATE 35: CPT | Performed by: UROLOGY

## 2022-05-29 RX ADMIN — HYDROMORPHONE HYDROCHLORIDE 4 MG: 4 TABLET ORAL at 16:35

## 2022-05-29 RX ADMIN — CEFEPIME HYDROCHLORIDE 1000 MG: 1 INJECTION, POWDER, FOR SOLUTION INTRAMUSCULAR; INTRAVENOUS at 10:33

## 2022-05-29 RX ADMIN — SODIUM CHLORIDE, SODIUM GLUCONATE, SODIUM ACETATE, POTASSIUM CHLORIDE, MAGNESIUM CHLORIDE, SODIUM PHOSPHATE, DIBASIC, AND POTASSIUM PHOSPHATE 75 ML/HR: .53; .5; .37; .037; .03; .012; .00082 INJECTION, SOLUTION INTRAVENOUS at 06:04

## 2022-05-29 RX ADMIN — CEFEPIME HYDROCHLORIDE 1000 MG: 1 INJECTION, POWDER, FOR SOLUTION INTRAMUSCULAR; INTRAVENOUS at 21:30

## 2022-05-29 RX ADMIN — ZOLPIDEM TARTRATE 10 MG: 5 TABLET ORAL at 21:25

## 2022-05-29 RX ADMIN — PREGABALIN 50 MG: 50 CAPSULE ORAL at 21:25

## 2022-05-29 RX ADMIN — INSULIN LISPRO 3 UNITS: 100 INJECTION, SOLUTION INTRAVENOUS; SUBCUTANEOUS at 12:09

## 2022-05-29 RX ADMIN — PREGABALIN 50 MG: 50 CAPSULE ORAL at 08:07

## 2022-05-29 RX ADMIN — ACETAMINOPHEN 650 MG: 325 TABLET, FILM COATED ORAL at 18:17

## 2022-05-29 RX ADMIN — TACROLIMUS 1 MG: 1 CAPSULE ORAL at 08:06

## 2022-05-29 RX ADMIN — INSULIN LISPRO 3 UNITS: 100 INJECTION, SOLUTION INTRAVENOUS; SUBCUTANEOUS at 07:55

## 2022-05-29 RX ADMIN — PREGABALIN 50 MG: 50 CAPSULE ORAL at 16:22

## 2022-05-29 RX ADMIN — TAMSULOSIN HYDROCHLORIDE 0.4 MG: 0.4 CAPSULE ORAL at 16:22

## 2022-05-29 RX ADMIN — INSULIN DETEMIR 16 UNITS: 100 INJECTION, SOLUTION SUBCUTANEOUS at 22:27

## 2022-05-29 RX ADMIN — TEMAZEPAM 30 MG: 15 CAPSULE ORAL at 21:25

## 2022-05-29 RX ADMIN — INSULIN LISPRO 2 UNITS: 100 INJECTION, SOLUTION INTRAVENOUS; SUBCUTANEOUS at 22:27

## 2022-05-29 RX ADMIN — INSULIN LISPRO 2 UNITS: 100 INJECTION, SOLUTION INTRAVENOUS; SUBCUTANEOUS at 16:22

## 2022-05-29 RX ADMIN — TACROLIMUS 1 MG: 1 CAPSULE ORAL at 21:25

## 2022-05-29 NOTE — ASSESSMENT & PLAN NOTE
· Due to hematuria/TURBT  · Baseline Hb 10-11  · Drop in Hb to 7 1 on 5/27 from 9 7 on admission  · S/p 1 PRBC on 5/27  · Hb now stable at 8 to 8 5  · Monitor Hb     · Transfuse as needed

## 2022-05-29 NOTE — PROGRESS NOTES
UROLOGY PROGRESS NOTE   Patient Identifiers: Queen Paras (MRN [id number])  Date of Service: 5/29/2022        Assessment:     1  Newly diagnosed bladder tumor  - status post extensive transurethral resection by Dr Kerry Marley    2 Right hydroureteronephrosis secondary to the above   - now status post successful right nephrostomy tube placement     3  Complicated UTI  - clinically doing well on cefepime    Patient is overall doing well from a urologic perspective  The degree to which his renal function will improve status post nephrostomy tube placement remains unclear but this will be trended as an outpatient    From a surgical standpoint the patient is stable for discharge to home  I explained that he will require ongoing hospital stay to determine the best antibiotic choice as an outpatient given his complex infection and complex medical history    Greatly appreciate the assistance of Infectious Disease in this regard        Plan:   - patient is medically stable for discharge home from a surgical standpoint  - Webb catheter and right nephrostomy tube to remain to gravity drainage at discharge  - I recommend Case Management consultation for home health given the above medical needs  - patient will follow-up with Dr Kerry Marley, scheduled for June 10th to review options for next steps in his urologic management  Patient's surgical pathology remains pending at the time of this dictation    At this point urology will sign off please do not hesitate to contact with any additional questions during his hospital stay      Subjective:     24 HR EVENTS:   PCN placed successfully yesterday  Patient has  no complaints    He says he is feeling very well feeling stronger and is eager for hospital discharge    Objective:     VITALS:    Vitals:    05/29/22 0642   BP: 117/64   Pulse: 76   Resp:    Temp: 98 °F (36 7 °C)   SpO2: 91%       INS & OUTS:  [unfilled]    LABS:  Lab Results   Component Value Date    HGB 8 5 (L) 05/29/2022    HCT 27 2 (L) 05/29/2022    WBC 8 25 05/29/2022    PLT 91 (L) 05/29/2022   ]    Lab Results   Component Value Date     12/17/2015    K 4 3 05/29/2022     05/29/2022    CO2 28 05/29/2022    BUN 39 (H) 05/29/2022    CREATININE 2 70 (H) 05/29/2022    CALCIUM 8 6 05/29/2022    GLUCOSE 145 (H) 05/23/2021   ]    INPATIENT MEDS:    Current Facility-Administered Medications:     acetaminophen (TYLENOL) tablet 650 mg, 650 mg, Oral, Q6H PRN, Anh Lang MD, 650 mg at 05/27/22 0117    belladonna-opium (B&O SUPPOSITORY) 16 2-30 mg suppository 1 suppository, 30 mg, Rectal, Q8H PRN, Anh Lang MD, 1 suppository at 05/25/22 2128    cefepime (MAXIPIME) 1,000 mg in dextrose 5 % 50 mL IVPB, 1,000 mg, Intravenous, Q12H, Patrick Hobbs DO, Last Rate: 100 mL/hr at 05/28/22 2131, 1,000 mg at 05/28/22 2131    fentaNYL (SUBLIMAZE) injection 25 mcg, 25 mcg, Intravenous, Q5 Min PRN, Briana Burger CRNA    HYDROmorphone (DILAUDID) injection 0 5 mg, 0 5 mg, Intravenous, Q4H PRN, Anh Lang MD, 0 5 mg at 05/28/22 1732    HYDROmorphone (DILAUDID) tablet 2 mg, 2 mg, Oral, Q4H PRN, Anh Lang MD, 2 mg at 05/28/22 1451    HYDROmorphone (DILAUDID) tablet 4 mg, 4 mg, Oral, Q4H PRN, Anh Lang MD, 4 mg at 05/28/22 2043    insulin detemir (LEVEMIR) subcutaneous injection 12 Units, 12 Units, Subcutaneous, HS, Justice Cortes MD, 12 Units at 05/28/22 2131    insulin lispro (HumaLOG) 100 units/mL subcutaneous injection 1-5 Units, 1-5 Units, Subcutaneous, TID AC, 3 Units at 05/29/22 0755 **AND** Fingerstick Glucose (POCT), , , TID AC, Anh Lang MD    insulin lispro (HumaLOG) 100 units/mL subcutaneous injection 1-5 Units, 1-5 Units, Subcutaneous, HS, Anh Lang MD, 2 Units at 05/28/22 2130    multi-electrolyte (PLASMALYTE-A/ISOLYTE-S PH 7 4) IV solution, 75 mL/hr, Intravenous, Continuous, Anh Lang MD, Last Rate: 75 mL/hr at 05/29/22 0604, 75 mL/hr at 05/29/22 0604    ondansetron (ZOFRAN) injection 4 mg, 4 mg, Intravenous, Q6H PRN, Gustavo Neff MD, 4 mg at 05/28/22 1013    oxybutynin (DITROPAN) tablet 5 mg, 5 mg, Oral, TID PRN, Gustavo Neff MD, 5 mg at 05/28/22 0904    pregabalin (LYRICA) capsule 50 mg, 50 mg, Oral, TID, Gustavo Neff MD, 50 mg at 05/28/22 2132    tacrolimus (PROGRAF) capsule 1 mg, 1 mg, Oral, Q12H, Gustavo Neff MD, 1 mg at 05/28/22 2133    tamsulosin (FLOMAX) capsule 0 4 mg, 0 4 mg, Oral, Daily With Kylee Sellers MD, 0 4 mg at 05/28/22 1726    temazepam (RESTORIL) capsule 30 mg, 30 mg, Oral, HS, Gustavo Neff MD, 30 mg at 05/28/22 2132    zolpidem (AMBIEN) tablet 10 mg, 10 mg, Oral, HS, Gustavo Neff MD, 10 mg at 05/28/22 2132      Physical Exam:   /64   Pulse 76   Temp 98 °F (36 7 °C)   Resp 16   SpO2 91%   GEN: resting comfortably  RESP: breathing comfortably with no accessory muscle use  CV: no significant peripheral edema  ABD: soft and appropriately tender to palpation  INCISION: none  DRAINS:  Nephrostomy tube in place draining tyron urine with no clots    Site is intact  CHICAS: in place draining clear yellow urine

## 2022-05-29 NOTE — PROGRESS NOTES
NEPHROLOGY PROGRESS NOTE   Eva Race 74 y o  male MRN: [id number]  Unit/Bed#: -01 Encounter: [id number]  Reason for Consult:  Acute kidney injury    Assessment/Plan:  1  Acute kidney injury most likely secondary to obstructive uropathy given right-sided hydronephrosis from bladder mass  2  Chronic kidney disease stage 4 with previous baseline creatinine 1 7-2 2 although since March has been between 2 8 in 2 9 although that may be secondary to developing hydronephrosis  3  Gross hematuria, overall appears to be improved status post cystoscopy showing 5 cm mass, biopsy pending  4  Right-sided hydronephrosis secondary to bladder mass, unable to place a ureteral stent, post percutaneous nephrostomy tube placementDiabetes with associated diabetic kidney disease  5  Liver transplant currently on tacrolimus, Prograf level  1 9, recommend repeat levels given fluctuant readings  6  Anemia of chronic kidney disease hemoglobin currently 8 5     PLAN:  · Overall renal function remains stable with creatinine 2 7  · Blood pressure volume status acceptable  · Okay to discontinue IV fluids, oral intake appears adequate  · Stable for discharge from Nephrology standpoint    SUBJECTIVE:  Seen and examined  Patient awake alert  Offers no new complaints  States that he is ready to return home  Appetite is normal   No reports of chest pain or shortness of breath  Review of Systems    OBJECTIVE:  Current Weight:    Vitals:    05/28/22 1520 05/28/22 2132 05/28/22 2233 05/29/22 0642   BP: 119/67 124/74 124/69 117/64   Pulse: 84 72 72 76   Resp:   16    Temp: 98 2 °F (36 8 °C)  98 2 °F (36 8 °C) 98 °F (36 7 °C)   TempSrc:       SpO2: 92% 94% 93% 91%       Intake/Output Summary (Last 24 hours) at 5/29/2022 1051  Last data filed at 5/29/2022 0701  Gross per 24 hour   Intake 2390 ml   Output 1900 ml   Net 490 ml       Physical Exam  HENT:      Head: Normocephalic and atraumatic     Eyes:      General: No scleral icterus  Cardiovascular:      Rate and Rhythm: Normal rate and regular rhythm  Pulmonary:      Effort: Pulmonary effort is normal       Breath sounds: Normal breath sounds  Abdominal:      General: There is no distension  Palpations: Abdomen is soft  Musculoskeletal:      Right lower leg: No edema  Left lower leg: No edema  Skin:     General: Skin is warm and dry  Neurological:      Mental Status: He is alert and oriented to person, place, and time           Medications:    Current Facility-Administered Medications:     acetaminophen (TYLENOL) tablet 650 mg, 650 mg, Oral, Q6H PRN, Fito Doe MD, 650 mg at 05/27/22 0117    belladonna-opium (B&O SUPPOSITORY) 16 2-30 mg suppository 1 suppository, 30 mg, Rectal, Q8H PRN, Ftio Doe MD, 1 suppository at 05/25/22 2128    cefepime (MAXIPIME) 1,000 mg in dextrose 5 % 50 mL IVPB, 1,000 mg, Intravenous, Q12H, Patrick Hobbs DO, Last Rate: 100 mL/hr at 05/29/22 1033, 1,000 mg at 05/29/22 1033    fentaNYL (SUBLIMAZE) injection 25 mcg, 25 mcg, Intravenous, Q5 Min PRN, Briana Burger, ZEV    HYDROmorphone (DILAUDID) injection 0 5 mg, 0 5 mg, Intravenous, Q4H PRN, Fito Doe MD, 0 5 mg at 05/28/22 1732    HYDROmorphone (DILAUDID) tablet 2 mg, 2 mg, Oral, Q4H PRN, Fito Doe MD, 2 mg at 05/28/22 1451    HYDROmorphone (DILAUDID) tablet 4 mg, 4 mg, Oral, Q4H PRN, Fito Doe MD, 4 mg at 05/28/22 2043    insulin detemir (LEVEMIR) subcutaneous injection 12 Units, 12 Units, Subcutaneous, HS, Jessica Porras MD, 12 Units at 05/28/22 2131    insulin lispro (HumaLOG) 100 units/mL subcutaneous injection 1-5 Units, 1-5 Units, Subcutaneous, TID AC, 3 Units at 05/29/22 5445 **AND** Fingerstick Glucose (POCT), , , TID AC, Fito Doe MD    insulin lispro (HumaLOG) 100 units/mL subcutaneous injection 1-5 Units, 1-5 Units, Subcutaneous, HS, Fito Doe MD, 2 Units at 05/28/22 2130   multi-electrolyte (PLASMALYTE-A/ISOLYTE-S PH 7 4) IV solution, 75 mL/hr, Intravenous, Continuous, Franchesca Mena MD, Last Rate: 75 mL/hr at 05/29/22 0604, 75 mL/hr at 05/29/22 0604    ondansetron [facility]) injection 4 mg, 4 mg, Intravenous, Q6H PRN, Franchesca Mena MD, 4 mg at 05/28/22 1013    oxybutynin (DITROPAN) tablet 5 mg, 5 mg, Oral, TID PRN, Franchesca Mena MD, 5 mg at 05/28/22 0904    pregabalin (LYRICA) capsule 50 mg, 50 mg, Oral, TID, Franchesca Mena MD, 50 mg at 05/29/22 0807    tacrolimus (PROGRAF) capsule 1 mg, 1 mg, Oral, Q12H, Franchesca Mena MD, 1 mg at 05/29/22 0806    tamsulosin (FLOMAX) capsule 0 4 mg, 0 4 mg, Oral, Daily With Dinner, Franchesca Mena MD, 0 4 mg at 05/28/22 1726    temazepam (RESTORIL) capsule 30 mg, 30 mg, Oral, HS, Franchesca Mena MD, 30 mg at 05/28/22 2132    zolpidem (AMBIEN) tablet 10 mg, 10 mg, Oral, HS, Franchesca Mena MD, 10 mg at 05/28/22 2132    Laboratory Results:  Results from last 7 days   Lab Units 05/29/22  0603 05/28/22  0537 05/27/22  2121 05/27/22  0502 05/27/22  0247 05/26/22  0522 05/25/22  1103 05/23/22  1804 05/23/22  0638 05/23/22  0543   WBC Thousand/uL 8 25 8 15  --  7 73 8 78 9 19 14 54*  --   --  7 59   HEMOGLOBIN g/dL 8 5* 8 3* 8 0* 7 1* 7 3* 8 0* 9 0*   < >  --  9 7*   HEMATOCRIT % 27 2* 26 1* 25 2* 23 2* 23 8* 26 0* 28 5*   < >  --  31 7*   PLATELETS Thousands/uL 91* 92*  --  80* 93* 101* 104*  --   --  162   POTASSIUM mmol/L 4 3 4 3  --  4 3  --  4 3 4 7  --  4 5 6 0*   CHLORIDE mmol/L 108 107  --  108  --  109* 107  --   --  105   CO2 mmol/L 28 29  --  25  --  26 27  --   --  29   BUN mg/dL 39* 37*  --  40*  --  42* 40*  --   --  42*   CREATININE mg/dL 2 70* 2 81*  --  2 73*  --  3 28* 3 08*  --   --  2 94*   CALCIUM mg/dL 8 6 8 3  --  8 0*  --  8 4 9 1  --   --  9 0    < > = values in this interval not displayed

## 2022-05-29 NOTE — PROGRESS NOTES
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  5  History of CVA S/P left frontotemporal craniotomy    Recommendations:   1  R/0 postop urinary tract  Patient is afebrile  WBC count is WNL  1  Had right PCN placed by IR    2  Follow serial creatinine which is currently 2 70  3  Check final blood and urine culture results which are negative so far  Will repeat procalcitonin and if it normalizes and blood cultures remain negative then antibiotics could potentially be discontinued  4  Pending above continue cefepime 1 g q 12 hours IV    2  Bladder tumor   1  Await pathology results of biopsy    Antibiotics:  1  Cefepime 1 g q 12 hours IV, day 4 total antibiotics   Subjective:  Patient had a headache and lower pelvic pain earlier  Denies fevers, chills, or sweats  Denies nausea, vomiting, or diarrhea  Objective:  Vitals:  Temp:  [98 °F (36 7 °C)-98 2 °F (36 8 °C)] 98 °F (36 7 °C)  HR:  [72-76] 74  Resp:  [16] 16  BP: (115-124)/(64-74) 115/64  SpO2:  [91 %-94 %] 92 %  Temp (24hrs), Av 1 °F (36 7 °C), Min:98 °F (36 7 °C), Max:98 2 °F (36 8 °C)  Current: Temperature: 98 °F (36 7 °C)    Physical Exam:     General Appearance:  Eyes:   Alert, nontoxic, no acute distress  Conjunctiva pale   Throat: Oropharynx moist without lesions    Lips, mucosa, and tongue normal   Neck: Supple, symmetrical, trachea midline, no adenopathy,  no tenderness/mass/nodules   Lungs:   Clear to auscultation bilaterally, no audible wheezes, rhonchi or rales; respirations unlabored   Heart:  Regular rate and rhythm, S1, S2 normal, no murmur, rub or gallop   Abdomen:   Soft, non-tender, surgical scars, non-distended, positive bowel sounds  No masses, no organomegaly    No CVA tenderness, Webb catheter with clear  greenurine, right PCN in place with sanguinous return   Extremities: Extremities normal, atraumatic, no clubbing, cyanosis or edema   Skin: Skin color pale, surgical scars, tattoos, as above         Invasive Devices  Report    Peripherally Inserted Central Catheter Line  Duration           PICC Line 05/24/22 Right Brachial 5 days          Drain  Duration           Continuous Bladder Irrigation Three-way 3 days    Nephrostomy Right 8 5 Fr  1 day                Labs, Imaging, & Other studies:   All pertinent labs were personally reviewed  Results from last 7 days   Lab Units 05/29/22  0603 05/28/22  0537 05/27/22  2121 05/27/22  0502   WBC Thousand/uL 8 25 8 15  --  7 73   HEMOGLOBIN g/dL 8 5* 8 3* 8 0* 7 1*   PLATELETS Thousands/uL 91* 92*  --  80*     Results from last 7 days   Lab Units 05/29/22  0603 05/28/22  0537 05/27/22  0502 05/23/22  0638 05/23/22  0543   SODIUM mmol/L 141 139 139   < > 134*   POTASSIUM mmol/L 4 3 4 3 4 3   < > 6 0*   CHLORIDE mmol/L 108 107 108   < > 105   CO2 mmol/L 28 29 25   < > 29   BUN mg/dL 39* 37* 40*   < > 42*   CREATININE mg/dL 2 70* 2 81* 2 73*   < > 2 94*   EGFR ml/min/1 73sq m 22 21 21   < > 20   CALCIUM mg/dL 8 6 8 3 8 0*   < > 9 0   AST U/L  --   --  18  --  57*   ALT U/L  --   --  9*  --  25   ALK PHOS U/L  --   --  57  --  85    < > = values in this interval not displayed  Results from last 7 days   Lab Units 05/28/22  1049 05/27/22  1003 05/23/22  0612   BLOOD CULTURE   --  No Growth at 48 hrs    No Growth at 48 hrs   --    GRAM STAIN RESULT  1+ Polys  No organisms seen  --   --    URINE CULTURE   --   --  No Growth <1000 cfu/mL   BODY FLUID CULTURE, STERILE  No growth  --   --

## 2022-05-30 VITALS
HEART RATE: 73 BPM | DIASTOLIC BLOOD PRESSURE: 73 MMHG | SYSTOLIC BLOOD PRESSURE: 129 MMHG | OXYGEN SATURATION: 97 % | TEMPERATURE: 98.2 F | RESPIRATION RATE: 16 BRPM

## 2022-05-30 PROBLEM — R50.9 FEVER: Status: RESOLVED | Noted: 2022-05-27 | Resolved: 2022-05-30

## 2022-05-30 LAB
ANION GAP SERPL CALCULATED.3IONS-SCNC: 4 MMOL/L (ref 4–13)
BUN SERPL-MCNC: 43 MG/DL (ref 5–25)
CALCIUM SERPL-MCNC: 8.3 MG/DL (ref 8.3–10.1)
CHLORIDE SERPL-SCNC: 110 MMOL/L (ref 100–108)
CO2 SERPL-SCNC: 29 MMOL/L (ref 21–32)
CREAT SERPL-MCNC: 2.54 MG/DL (ref 0.6–1.3)
ERYTHROCYTE [DISTWIDTH] IN BLOOD BY AUTOMATED COUNT: 15.2 % (ref 11.6–15.1)
GFR SERPL CREATININE-BSD FRML MDRD: 23 ML/MIN/1.73SQ M
GLUCOSE SERPL-MCNC: 103 MG/DL (ref 65–140)
GLUCOSE SERPL-MCNC: 103 MG/DL (ref 65–140)
GLUCOSE SERPL-MCNC: 152 MG/DL (ref 65–140)
GLUCOSE SERPL-MCNC: 184 MG/DL (ref 65–140)
HCT VFR BLD AUTO: 25.4 % (ref 36.5–49.3)
HGB BLD-MCNC: 8 G/DL (ref 12–17)
MCH RBC QN AUTO: 24.6 PG (ref 26.8–34.3)
MCHC RBC AUTO-ENTMCNC: 31.5 G/DL (ref 31.4–37.4)
MCV RBC AUTO: 78 FL (ref 82–98)
PLATELET # BLD AUTO: 95 THOUSANDS/UL (ref 149–390)
PMV BLD AUTO: 13.1 FL (ref 8.9–12.7)
POTASSIUM SERPL-SCNC: 4.1 MMOL/L (ref 3.5–5.3)
PROCALCITONIN SERPL-MCNC: 0.2 NG/ML
RBC # BLD AUTO: 3.25 MILLION/UL (ref 3.88–5.62)
SODIUM SERPL-SCNC: 143 MMOL/L (ref 136–145)
WBC # BLD AUTO: 8.31 THOUSAND/UL (ref 4.31–10.16)

## 2022-05-30 PROCEDURE — 82948 REAGENT STRIP/BLOOD GLUCOSE: CPT

## 2022-05-30 PROCEDURE — 99232 SBSQ HOSP IP/OBS MODERATE 35: CPT | Performed by: INTERNAL MEDICINE

## 2022-05-30 PROCEDURE — 85027 COMPLETE CBC AUTOMATED: CPT | Performed by: INTERNAL MEDICINE

## 2022-05-30 PROCEDURE — 99239 HOSP IP/OBS DSCHRG MGMT >30: CPT | Performed by: INTERNAL MEDICINE

## 2022-05-30 PROCEDURE — 84145 PROCALCITONIN (PCT): CPT | Performed by: INTERNAL MEDICINE

## 2022-05-30 PROCEDURE — 80048 BASIC METABOLIC PNL TOTAL CA: CPT | Performed by: INTERNAL MEDICINE

## 2022-05-30 PROCEDURE — 97163 PT EVAL HIGH COMPLEX 45 MIN: CPT

## 2022-05-30 RX ORDER — CLOPIDOGREL BISULFATE 75 MG/1
75 TABLET ORAL DAILY
Status: DISCONTINUED | OUTPATIENT
Start: 2022-05-30 | End: 2022-05-30 | Stop reason: HOSPADM

## 2022-05-30 RX ORDER — INSULIN DETEMIR 100 [IU]/ML
18 INJECTION, SOLUTION SUBCUTANEOUS
Qty: 15 ML | Refills: 0 | Status: SHIPPED | OUTPATIENT
Start: 2022-05-30

## 2022-05-30 RX ADMIN — ACETAMINOPHEN 650 MG: 325 TABLET, FILM COATED ORAL at 12:41

## 2022-05-30 RX ADMIN — HYDROMORPHONE HYDROCHLORIDE 0.5 MG: 1 INJECTION, SOLUTION INTRAMUSCULAR; INTRAVENOUS; SUBCUTANEOUS at 06:05

## 2022-05-30 RX ADMIN — PREGABALIN 50 MG: 50 CAPSULE ORAL at 09:06

## 2022-05-30 RX ADMIN — PREGABALIN 50 MG: 50 CAPSULE ORAL at 16:23

## 2022-05-30 RX ADMIN — TAMSULOSIN HYDROCHLORIDE 0.4 MG: 0.4 CAPSULE ORAL at 16:23

## 2022-05-30 RX ADMIN — CEFEPIME HYDROCHLORIDE 1000 MG: 1 INJECTION, POWDER, FOR SOLUTION INTRAMUSCULAR; INTRAVENOUS at 10:56

## 2022-05-30 RX ADMIN — INSULIN LISPRO 2 UNITS: 100 INJECTION, SOLUTION INTRAVENOUS; SUBCUTANEOUS at 16:23

## 2022-05-30 RX ADMIN — INSULIN LISPRO 1 UNITS: 100 INJECTION, SOLUTION INTRAVENOUS; SUBCUTANEOUS at 11:12

## 2022-05-30 RX ADMIN — CLOPIDOGREL BISULFATE 75 MG: 75 TABLET ORAL at 14:41

## 2022-05-30 RX ADMIN — TACROLIMUS 1 MG: 1 CAPSULE ORAL at 09:06

## 2022-05-30 NOTE — PROGRESS NOTES
NEPHROLOGY PROGRESS NOTE   Pavan Eller 74 y o  male MRN: [id number]  Unit/Bed#: -01 Encounter: [id number]        ASSESSMENT    1  Acute kidney injury secondary to obstructive uropathy and right-sided hydronephrosis from bladder mass  2  Stage 4 chronic kidney disease previous baseline of 1 7-2 2 although this may have progressed to 2 8-2 9  3  Gross hematuria status post cystoscopy and was found to have a mass with biopsy pending  4  Right-sided hydronephrosis secondary to bladder mass status post percutaneous nephrostomy tube placement  5  Orthotopic liver transplant  6  Anemia of chronic kidney disease    PLAN     Over 1 L of urine output with a creatinine that is improved to 2 54   Electrolytes and acid-base status are stable   Ongoing urologic evaluation remains with percutaneous nephrostomy and Webb catheter placement   Repeating tacrolimus level currently on 1 mg twice daily history of orthotopic liver transplant can consider hepatology evaluation   Monitor H&H microcytic anemia, check iron stores, once off IV antibiotics will consider iron supplementation no Epogen in the setting of potential mass    SUBJECTIVE:    Patient was seen today  Feeling better denies any chest pain or shortness of breath    12 point review of systems was otherwise negative besides what is mentioned above      Medications:    Current Facility-Administered Medications:     acetaminophen (TYLENOL) tablet 650 mg, 650 mg, Oral, Q6H PRN, Keyla Fulton MD, 650 mg at 05/30/22 1241    belladonna-opium (B&O SUPPOSITORY) 16 2-30 mg suppository 1 suppository, 30 mg, Rectal, Q8H PRN, Keyla Fulton MD, 1 suppository at 05/25/22 2128    clopidogrel (PLAVIX) tablet 75 mg, 75 mg, Oral, Daily, Mike Hines MD    fentaNYL (SUBLIMAZE) injection 25 mcg, 25 mcg, Intravenous, Q5 Min PRN, Briana Burger CRNA    HYDROmorphone (DILAUDID) injection 0 5 mg, 0 5 mg, Intravenous, Q4H PRN, Keyla Fulton MD, 0 5 mg at 05/30/22 8765    HYDROmorphone (DILAUDID) tablet 2 mg, 2 mg, Oral, Q4H PRN, Elana Ruggiero MD, 2 mg at 05/28/22 1451    HYDROmorphone (DILAUDID) tablet 4 mg, 4 mg, Oral, Q4H PRN, Elana Ruggiero MD, 4 mg at 05/29/22 1635    insulin detemir (LEVEMIR) subcutaneous injection 16 Units, 16 Units, Subcutaneous, HS, Joselito Cabrera MD, 16 Units at 05/29/22 2227    insulin lispro (HumaLOG) 100 units/mL subcutaneous injection 1-5 Units, 1-5 Units, Subcutaneous, TID AC, 1 Units at 05/30/22 1112 **AND** Fingerstick Glucose (POCT), , , TID AC, Elana Ruggiero MD    insulin lispro (HumaLOG) 100 units/mL subcutaneous injection 1-5 Units, 1-5 Units, Subcutaneous, HS, Elana Ruggiero MD, 2 Units at 05/29/22 2227    ondansetron (ZOFRAN) injection 4 mg, 4 mg, Intravenous, Q6H PRN, Elana Ruggiero MD, 4 mg at 05/28/22 1013    oxybutynin (DITROPAN) tablet 5 mg, 5 mg, Oral, TID PRN, Elana Ruggiero MD, 5 mg at 05/28/22 0904    pregabalin (LYRICA) capsule 50 mg, 50 mg, Oral, TID, Elana Ruggiero MD, 50 mg at 05/30/22 0906    tacrolimus (PROGRAF) capsule 1 mg, 1 mg, Oral, Q12H, Elana Ruggiero MD, 1 mg at 05/30/22 0906    tamsulosin (FLOMAX) capsule 0 4 mg, 0 4 mg, Oral, Daily With Dinner, Elana Ruggiero MD, 0 4 mg at 05/29/22 1622    temazepam (RESTORIL) capsule 30 mg, 30 mg, Oral, HS, Elana Ruggiero MD, 30 mg at 05/29/22 2125    zolpidem (AMBIEN) tablet 10 mg, 10 mg, Oral, HS, Elana Ruggiero MD, 10 mg at 05/29/22 2125    OBJECTIVE:    Vitals:    05/29/22 0642 05/29/22 1541 05/29/22 2129 05/30/22 0734   BP: 117/64 115/64 123/77 125/73   Pulse: 76 74 73 72   Resp:       Temp: 98 °F (36 7 °C)  (!) 97 3 °F (36 3 °C) (!) 96 8 °F (36 °C)   TempSrc:       SpO2: 91% 92% 97% 97%        Temp:  [96 8 °F (36 °C)-97 3 °F (36 3 °C)] 96 8 °F (36 °C)  HR:  [72-74] 72  BP: (115-125)/(64-77) 125/73  SpO2:  [92 %-97 %] 97 %     There is no height or weight on file to calculate BMI  Weight (last 2 days)     None          I/O last 3 completed shifts: In: 990 [I V :990]  Out: 3150 [Urine:3050; Other:100]    I/O this shift:  In: 290 [P O :290]  Out: -       Physical exam:    General: no acute distress, cooperative  Eyes: conjunctivae pink, anicteric sclerae  ENT: lips and mucous membranes moist, no exudates, normal external ears  Neck: ROM intact, no JVD  Chest: No respiratory distress, no accessory muscle use, percutaneous nephrostomy tube in place  CVS: normal rate, non pericardial friction rub  Abdomen: soft, non-tender, non-distended, normoactive bowel sounds  Extremities: no edema of both legs  Skin: no rash  Neuro: awake, alert, oriented, grossly intact  Psych:  Pleasant affect    Invasive Devices:      Lab Results:   Results from last 7 days   Lab Units 05/30/22  0601 05/29/22  0603 05/28/22  0537 05/27/22  2121 05/27/22  1113 05/27/22  1003 05/27/22  0502 05/27/22  0247 05/26/22  0522   WBC Thousand/uL 8 31 8 25 8 15  --   --   --  7 73 8 78 9 19   HEMOGLOBIN g/dL 8 0* 8 5* 8 3* 8 0*  --   --  7 1* 7 3* 8 0*   HEMATOCRIT % 25 4* 27 2* 26 1* 25 2*  --   --  23 2* 23 8* 26 0*   PLATELETS Thousands/uL 95* 91* 92*  --   --   --  80* 93* 101*   POTASSIUM mmol/L 4 1 4 3 4 3  --   --   --  4 3  --  4 3   CHLORIDE mmol/L 110* 108 107  --   --   --  108  --  109*   CO2 mmol/L 29 28 29  --   --   --  25  --  26   BUN mg/dL 43* 39* 37*  --   --   --  40*  --  42*   CREATININE mg/dL 2 54* 2 70* 2 81*  --   --   --  2 73*  --  3 28*   CALCIUM mg/dL 8 3 8 6 8 3  --   --   --  8 0*  --  8 4   ALK PHOS U/L  --   --   --   --   --   --  57  --   --    ALT U/L  --   --   --   --   --   --  9*  --   --    AST U/L  --   --   --   --   --   --  18  --   --    BLOOD CULTURE   --   --   --   --   --  No Growth at 48 hrs    No Growth at 48 hrs   --   --   --    LEUKOCYTES UA   --   --   --   --  Moderate*  --   --   --   --    BLOOD UA   --   --   --   --  Large*  --   --   --   -- Portions of the record may have been created with voice recognition software  Occasional wrong word or "sound a like" substitutions may have occurred due to the inherent limitations of voice recognition software  Read the chart carefully and recognize, using context, where substitutions have occurred  If you have any questions, please contact the dictating provider

## 2022-05-30 NOTE — PHYSICAL THERAPY NOTE
PHYSICAL THERAPY EVALUATION  NAME:  Urvashi Renae  DATE: 05/30/22    AGE:   74 y o  Mrn:   [id number]  ADMIT DX:  Blood in urine [R31 9]  Hematuria [R31 9]  Gross hematuria [R31 0]    Past Medical History:   Diagnosis Date    Alcoholism ([facility] )     Cerebral artery aneurysm     Change in mental state     last assessed 5/18/15; resolved 10/27/15    Diabetes mellitus ([place] [address] )     Drug dependence ([place] [address] )     Fatigue     last assessed 1/26/15; resolved 5/24/16    Hepatitis C     Hospital discharge follow-up 12/21/2018    Kidney disease     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[place] [address] )     Liver transplant recipient [facility])     Renal disorder     Subdural hygroma     2/27/14; resolved 7/28/15    Thrombocytopenia ([place] [address] ) 9/20/2017       Past Surgical History:   Procedure Laterality Date    BRAIN SURGERY  02/12/2014    left frontotemporal 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    CATARACT EXTRACTION      CHOLECYSTECTOMY      FL LUMBAR PUNCTURE DIAGNOSTIC  12/14/2018    IR PICC LINE  12/14/2018    LIVER TRANSPLANTATION      ROTATOR CUFF REPAIR      SHOULDER SURGERY         Length Of Stay: 7    PHYSICAL THERAPY EVALUATION:        05/30/22 1035   Note Type   Note type Evaluation   Pain Assessment   Pain Assessment Tool 0-10   Pain Score 2   Pain Location/Orientation Location: Abdomen   Pain Onset/Description Onset: Ongoing;Frequency: Constant/Continuous; Descriptor: Aching   Effect of Pain on Daily Activities No change in pain with activity   Patient's Stated Pain Goal No pain   Hospital Pain Intervention(s) Ambulation/increased activity;Repositioned   Restrictions/Precautions   Weight Bearing Precautions Per Order No   Other Precautions Multiple lines;Pain  (Nephrostomy tube, Webb)   Home Living   Type of [address] [place] [address] Two level;Stairs to enter with rails;Bed/bath upstairs  (5 DAWN, full flight to bed/bathroom)   Home Equipment   (None as per patient)   Additional Comments Patient reports living with supportive spouse who is able to assist as needed  Patient also states he has additional local family who could assist if needed   Prior Function   Level of [place] Independent with ADLs and functional mobility   Lives With Spouse   Receives Help From Family   ADL Assistance Independent   Falls in the last 6 months 0   Comments Patient denies use of an assistive device for ambulation prior to admission   General   Family/Caregiver Present No   Cognition   Overall Cognitive Status WFL   Arousal/Participation Alert   Orientation Level Oriented X4   Memory Within functional limits   Following Commands Follows all commands and directions without difficulty   RUE Assessment   RUE Assessment WFL   LUE Assessment   LUE Assessment WFL   RLE Assessment   RLE Assessment WFL   LLE Assessment   LLE Assessment WFL   Bed Mobility   Supine to Sit 5  Supervision   Additional items Increased time required   Sit to Supine 5  Supervision   Transfers   Sit to Stand 5  Supervision   Additional items Increased time required   Stand to Sit 5  Supervision   Additional items Increased time required   Ambulation/Elevation   Gait pattern Short stride; Foward flexed   Gait Assistance 5  Supervision   Assistive Device None   Distance 75ft x 2   Stair Management Assistance 5  Supervision   Stair Management Technique One rail R   Number of Stairs 8   Balance   Static Sitting Fair +   Static Standing Fair   Ambulatory Fair -   Activity Tolerance   Activity Tolerance Patient limited by pain   Medical Staff [address], rehab aide   Nurse Made Aware Patient appropriate to be seen and mobilized per nursing   Assessment   Prognosis Good   Problem List Decreased mobility;Pain   Assessment Pt is 74 y o  male seen for PT evaluation s/p admit to [facility] on 5/23/2022  Two pt identifiers were used to confirm  Pt presented w/ blood in urine    Pt was admitted with a primary dx of:  Bladder tumor, gross hematuria, hydronephrosis of right kidney, and other active problems including fever, CARLOTTA superimposed on CKD, acute blood loss anemia, type 2 DM, liver transplant status, history of CVA, peripheral neuropathy  Patient underwent TRANSURETHRAL RESECTION OF BLADDER TUMOR (TURBT) which was performed on 05/26/2022 as well as IR nephrostomy tube placement on 05/28/2022  PT now consulted for assessment of mobility and d/c needs  Pts current co morbidities affecting treatment include:  Alcoholism, DM, hepatitis-C, history of liver transplant, history of CVA, CKD, and personal factors including steps to manage at home  Pts current clinical presentation is Unstable/ Unpredictable (high complexity) due to Ongoing medical management for primary dx, Decreased activity tolerance compared to baseline, Increased assistance needed from caregiver at current time, Continuous pulse oximetry monitoring , s/p surgical intervention    Upon evaluation, pt currently is requiring Supervision for bed mobility; Supervision for transfers and Supervision for ambulation w/ no AD  Pt presents at PT eval functioning below baseline and currently w/ overall mobility deficits 2* to: decreased endurance, pain, decreased activity tolerance compared to baseline  At conclusion of PT session pt returned BTB with phone and call bell within reach  Pt denies any further questions at this time  PT is currently recommending home with family support  D/C acute care PT at this time due to pt being supervision with all mobility and having supportive family who are able to assist as needed  Pt denies any mobility or safety concerns about returning home at d/c  Recommend pt continues to mobilize with nsg and restorative techs during hospital stay     Barriers to Discharge None   Barriers to Discharge Comments Patient denies any mobility or safety concerns about returning home at time of discharge   Goals   Patient Goals " to go home"   Plan   PT Frequency   (DC IPPT)   Recommendation PT Discharge Recommendation No rehabilitation needs  (home with family support)   Equipment Recommended   (none at this time)   Additional Comments Patient denies any mobility or safety concerns about returning home at time of discharge   Ama 8 in Bed Without Bedrails 4   Lying on Back to Sitting on Edge of Flat Bed 3   Moving Bed to Chair 4   Standing Up From Chair 4   Walk in Room 4   Climb 3-5 Stairs 3   Basic Mobility Inpatient Raw Score 22   Basic Mobility Standardized Score 47 4   Highest Level Of Mobility   JH-HLM Goal 7: Walk 25 feet or more   JH-HLM Achieved 7: Walk 25 feet or more   Modified Raj Scale   Modified [place] Scale 2   Barthel Index   Feeding 10   Bathing 5   Grooming Score 5   Dressing Score 10   Bladder Score 0   Bowels Score 10   Toilet Use Score 10   Transfers (Bed/Chair) Score 15   Mobility (Level Surface) Score 10   Stairs Score 5   Barthel Index Score 80   Portions of the documentation may have been created using voice recognition software  Occasional wrong word or sound alike substitutions may have occurred due to the inherent limitations of the voice recognition software  Read the chart carefully and recognize, using context, where substitutions have occurred      Carolyn Reaves, PT, DPT

## 2022-05-30 NOTE — DISCHARGE INSTR - AVS FIRST PAGE
Get blood work done to check you blood counts and kidney function in 1 week (get script from your primary care physician)    Results of bladder mass biopsy are pending at the time of discharge   Discuss results with your primary care physician and with your urologist (appointment on [address] 10)

## 2022-05-30 NOTE — CASE MANAGEMENT
Case Management Discharge Planning Note    Patient name Cira Zeng  Location [address] [id number]/-[id number] MRN [id number]  : 1950 Date 2022       Current Admission Date: 2022  Current Admission Diagnosis:Bladder tumor   Patient Active Problem List    Diagnosis Date Noted    Acute blood loss anemia 2022    Fever 2022    Bladder tumor 2022    Problem with vascular access 2022    Gross hematuria 2022    Hydronephrosis of right kidney 2022    Mild cognitive impairment 2021    Subdural hygroma     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[facility] )     CKD (chronic kidney disease) 2021    Hand lesion 2020    Type 2 diabetes mellitus with chronic kidney disease ([facility] ) 10/14/2019    Type 2 diabetes mellitus with diabetic peripheral angiopathy without gangrene, with long-term current use of insulin ([facility] ) 2019    Hyperlipidemia 2019    Rash 2018    Medicare annual wellness visit, subsequent 2018    Screening for colon cancer 2018    Hepatitis B core antibody positive 2017    Acute kidney injury superimposed on CKD-4 2017    Th[facility]ocyt[facility] ([facility] ) 2017    History of CVA (cerebrovascular accident) 2017    Controlled diabetes mellitus with diabetic neuropathy, with long-term current use of insulin ([facility] ) 2017    Liver transplant status ([facility] ) 2017    History of immunosuppression therapy 2017    History of hepatitis C 2017    Pruritus 2016    Spondylosis of cervical region without myelopathy or radiculopathy 2016    Headache, chronic daily 2015    Vitamin D deficiency 2014    Occipital neuralgia 2014    Migraine headache 2014    BPPV (benign paroxysmal positional vertigo), unspecified laterality 2013    Cerebral arterial aneurysm 2013    Prurigo nodularis 10/15/2013    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    Erectile dysfunction of non-organic origin 09/11/2013    History of cirrhosis 07/09/2012    Insomnia 05/30/2012    Peripheral neuropathy 05/07/2012      LOS (days): 7  Geometric Mean LOS (GMLOS) (days): 2 10  Days to GMLOS:-5 2     OBJECTIVE:  Risk of Unplanned Readmission Score: 17 82         Current admission status: Inpatient   Preferred Pharmacy:   [address] [place] [address]  Phone: [phone] Fax: [phone]    Primary Care Provider: Sunshine Simmons DO    Primary Insurance: [facility]  Secondary Insurance:     DISCHARGE DETAILS:                                [address]         Is the patient interested in [address] at discharge?: Yes  [address] requested[de-identified] Medical Social Work, Nursing, Physical [address] Agency Name[de-identified] [address] [place] [address] Provider[de-identified] PCP  Home Health Services Needed[de-identified] Urinary Incontinence Catheter Management, Gait/ADL Training, Evaluate Functional Status and Safety, Strengthening/Theraputic Exercises to Improve Function, Other (comment) (social resources)  Homebound Criteria Met[de-identified] Requires the Assistance of Another Person for Safe Ambulation or to Leave the Home, Uses an Assist Device (i e  cane, walker, etc)  Supporting Clincal Findings[de-identified] Limited Endurance, Fatigues [place] in [employer]         Other Referral/Resources/Interventions Provided:  Interventions: [address]               Transport at Discharge : Family                             IMM Given (Date):: 05/30/22  IMM Given to[de-identified] Family  Family notified[de-identified] Brian Vance

## 2022-05-30 NOTE — PROGRESS NOTES
Progress Note - Infectious Disease   Cira Zeng 74 y o  male MRN: [id number]  Unit/Bed#: -01 Encounter: [id number]      Impression:  1  R/0 postop urinary tract infection  2  Bladder tumor R/0  Neoplasm S/P biopsy POD 4 S/P right PCN POD 1  3  History of alcoholic cirrhosis, hepatitis-C  S/P liver transplant (approximately 20 years ago)  4  Type 2 DM with CKD stage 4  5  History of CVA S/P left frontotemporal craniotomy    Recommendations:   1  R/0 postop urinary tract  Patient is afebrile  WBC count is WNL  1  Had right PCN placed by IR    2  Follow serial creatinine which is currently 2 54  3  Check final blood and urine culture results which are negative so far  Repeat procalcitonin is now normal   Discussed with primary service who will write orders  Antibiotics could be discontinued and patient cleared for discharge  2  Bladder tumor   1  Await pathology results of biopsy    Antibiotics:  1  None  Subjective:  Patient still has a headache  No further pelvic pain  Denies fevers, chills, or sweats  Denies nausea, vomiting, or diarrhea  Objective:  Vitals:  Temp:  [96 8 °F (36 °C)-98 2 °F (36 8 °C)] 98 2 °F (36 8 °C)  HR:  [72-73] 73  Resp:  [16] 16  BP: (123-129)/(73-77) 129/73  SpO2:  [97 %] 97 %  Temp (24hrs), Av 4 °F (36 3 °C), Min:96 8 °F (36 °C), Max:98 2 °F (36 8 °C)  Current: Temperature: 98 2 °F (36 8 °C)    Physical Exam:     General Appearance:    Eyes:   Alert, nontoxic, no acute distress  Appears comfortable lying in bed  Conjunctiva pale   Throat: Oropharynx moist without lesions  Lips, mucosa, and tongue normal   Neck: Supple, symmetrical, trachea midline, no adenopathy,  no tenderness/mass/nodules   Lungs:   Clear to auscultation bilaterally, no audible wheezes, rhonchi or rales; respirations unlabored   Heart:  Regular rate and rhythm, S1, S2 normal, no murmur, rub or gallop   Abdomen:   Soft, non-tender, surgical scars, non-distended, positive bowel sounds    No masses, no organomegaly    No CVA tenderness, Webb catheter with clear  greenurine, right PCN in place with sanguinous return   Extremities: Extremities normal, atraumatic, no clubbing, cyanosis or edema   Skin: Skin color pale, surgical scars, tattoos, as above         Invasive Devices  Report    Peripherally Inserted Central Catheter Line  Duration           PICC Line 05/24/22 Right Brachial 6 days          Drain  Duration           Continuous Bladder Irrigation Three-way 4 days    Nephrostomy Right 8 5 Fr  2 days                Labs, Imaging, & Other studies:   All pertinent labs were personally reviewed  Results from last 7 days   Lab Units 05/30/22  0601 05/29/22  0603 05/28/22  0537   WBC Thousand/uL 8 31 8 25 8 15   HEMOGLOBIN g/dL 8 0* 8 5* 8 3*   PLATELETS Thousands/uL 95* 91* 92*     Results from last 7 days   Lab Units 05/30/22  0601 05/29/22  0603 05/28/22  0537 05/27/22  0502   SODIUM mmol/L 143 141 139 139   POTASSIUM mmol/L 4 1 4 3 4 3 4 3   CHLORIDE mmol/L 110* 108 107 108   CO2 mmol/L 29 28 29 25   BUN mg/dL 43* 39* 37* 40*   CREATININE mg/dL 2 54* 2 70* 2 81* 2 73*   EGFR ml/min/1 73sq m 23 22 21 21   CALCIUM mg/dL 8 3 8 6 8 3 8 0*   AST U/L  --   --   --  18   ALT U/L  --   --   --  9*   ALK PHOS U/L  --   --   --  57     Results from last 7 days   Lab Units 05/28/22  1049 05/27/22  1003   BLOOD CULTURE   --  No Growth at 72 hrs  No Growth at 72 hrs     GRAM STAIN RESULT  1+ Polys  No organisms seen  --    BODY FLUID CULTURE, STERILE  No growth  --

## 2022-05-31 ENCOUNTER — TRANSITIONAL CARE MANAGEMENT (OUTPATIENT)
Dept: FAMILY MEDICINE CLINIC | Facility: CLINIC | Age: 72
End: 2022-05-31

## 2022-05-31 ENCOUNTER — PATIENT OUTREACH (OUTPATIENT)
Dept: FAMILY MEDICINE CLINIC | Facility: CLINIC | Age: 72
End: 2022-05-31

## 2022-05-31 ENCOUNTER — PREP FOR PROCEDURE (OUTPATIENT)
Dept: INTERVENTIONAL RADIOLOGY/VASCULAR | Facility: CLINIC | Age: 72
End: 2022-05-31

## 2022-05-31 DIAGNOSIS — N13.30 HYDRONEPHROSIS OF RIGHT KIDNEY: Primary | ICD-10-CM

## 2022-05-31 DIAGNOSIS — Z71.89 COMPLEX CARE COORDINATION: Primary | ICD-10-CM

## 2022-05-31 LAB
BACTERIA SPEC BFLD CULT: NO GROWTH
GRAM STN SPEC: NORMAL
GRAM STN SPEC: NORMAL

## 2022-05-31 RX ORDER — SODIUM CHLORIDE 9 MG/ML
10 INJECTION INTRAVENOUS DAILY
Qty: 300 ML | Refills: 2 | Status: SHIPPED | OUTPATIENT
Start: 2022-05-31 | End: 2022-07-20 | Stop reason: SDUPTHER

## 2022-05-31 NOTE — DISCHARGE SUMMARY
Discharging Physician / Practitioner: Jack Montesinos MD  PCP: Manuel Angeles DO  Admission Date:   Admission Orders (From admission, onward)     Ordered        05/23/22 8934  Inpatient Admission  Once                      Discharge Date: 05/30/22     Principal discharge diagnoses:  1  Bladder tumor  2  Gross hematuria  3  Hydronephrosis of right kidney  4  Fever - r/o post-op urinary tract infection  5  CARLOTTA on CKD-4  6  Acute blood loss anemia    Secondary diagnoses:  1  History of CVA  2  Status post liver transplant  3  Type 2 diabetes  4  Peripheral neuropathy    Consultations During Hospital Stay:  Urology  Nephrology  Infectious disease  Gastroenterology    Procedures Performed:   1  CT abdomen/pelvis(5/23/22) - Persistent moderate right-sided hydroureteronephrosis without a discrete ureteral calculus  There is however a large bladder mass now seen which may reflect hematoma and/or neoplasm  There is possible soft tissue density extending into the right ureterovesical junction  2  Chest Xray - mild bibasilar atelectasis  3  Renal US (5/27/22) - Mostly unchanged moderate to severe right kidney hydronephrosis with cortical thinning  4  Transurethral resection of bladder tumor on 5/26/22   5  Right percutaneous nephrostomy tube placement on 5/28/22    Test Results Pending at Discharge (will require follow up): Pathology from transurethral resection of bladder tumor  Final blood culture results from 5/27/22     Outpatient Tests Requested:  CBC, BMP in 1 week    Hospital Course:   Tino Mckinley is a 74 y o  male patient who originally presented to the hospital on 5/23/2022 due to gross hematuria  CT abdomen/pelvis showed a large bladder mass with right sided hydronephrosis  He underwent cystoscopy with transurethral resection of his bladder tumor on 5/26/22  A large mid to right argenis trigone tumor greater than 5 cm was noted  The right ureteral orifice could not be visualized   There was concern for tumor deep to the bladder wall  Hematuria resolved subsequently  He was discharged with a flores catheter which urology recommends maintaining till next week  Results of pathology are pending at the time of discharge  He had persistent right sided hydronephrosis for which a right percutaneous nephrostomy tube was placed on 5/28/22  He had acute kidney injury which was felt to be partly due to his right sided hydronephrosis  Renal function improved after his right sided PCN was placed  He had fever post-operatively for which he received Cefepime for 4 days with resolution of fever  Urine culture at time of nephrostomy was negative  Blood cultures were negative at 72 hours  He received 1 unit PRBC for drop in Hb from 9 7 to 7 1 in the setting of gross hematuria and TURBT  Hb was stable at 8 at the time of discharge  He has a history of liver transplant for which he follows up with Dr Alyssa Campos  His Tacrolimus level was mildly decreased and he will follow-up with his gastroenterologist on discharge for adjustment of dosage if needed  Condition at Discharge: stable    Discharge Day Visit / Exam:   Subjective:    Vitals: Blood Pressure: 129/73 (05/30/22 1502)  Pulse: 73 (05/30/22 1502)  Temperature: 98 2 °F (36 8 °C) (05/30/22 1502)  Temp Source: Temporal (05/28/22 1109)  Respirations: 16 (05/30/22 1502)  SpO2: 97 % (05/30/22 1502)  Exam:   Physical Exam  Vitals reviewed  HENT:      Head: Normocephalic  Nose: Nose normal       Mouth/Throat:      Mouth: Mucous membranes are moist    Eyes:      Extraocular Movements: Extraocular movements intact  Cardiovascular:      Rate and Rhythm: Normal rate and regular rhythm  Pulmonary:      Effort: Pulmonary effort is normal  No respiratory distress  Breath sounds: Normal breath sounds  No wheezing  Abdominal:      General: Bowel sounds are normal  There is no distension  Palpations: Abdomen is soft  Tenderness:  There is no abdominal tenderness  Musculoskeletal:         General: No swelling  Cervical back: Neck supple  Skin:     General: Skin is warm and dry  Neurological:      General: No focal deficit present  Mental Status: He is alert and oriented to person, place, and time  Psychiatric:         Mood and Affect: Mood normal          Behavior: Behavior normal           Discussion with Family: Updated  (significant other) via phone  Discharge instructions/Information to patient and family:   See after visit summary for information provided to patient and family  Provisions for Follow-Up Care:  See after visit summary for information related to follow-up care and any pertinent home health orders  Disposition:   Home with [facility] Services (Reminder: Complete face to face encounter)    Planned Readmission: No     Discharge Statement:  I spent 40 minutes discharging the patient  This time was spent on the day of discharge  I had direct contact with the patient on the day of discharge  Greater than 50% of the total time was spent examining patient, answering all patient questions, arranging and discussing plan of care with patient as well as directly providing post-discharge instructions  Additional time then spent on discharge activities  Discharge Medications:  See after visit summary for reconciled discharge medications provided to patient and/or family        **Please Note: This note may have been constructed using a voice recognition system**

## 2022-05-31 NOTE — TELEPHONE ENCOUNTER
Called and spoke with patients wife BAKARI IM GESÄUSE  BAKARI IM GESÄUSE confirmed appt  Patients wife questioning when flores can be removed  Please advise

## 2022-05-31 NOTE — UTILIZATION REVIEW
Notification of Discharge   This is a Notification of Discharge from our facility [address]  Please be advised that this patient has been discharge from our facility  Below you will find the admission and discharge date and time including the patients disposition  UTILIZATION REVIEW CONTACT:  Manuela Madera  Utilization   Network Utilization Review Department  Phone: [phone] x carefully listen to the prompts  All voicemails are confidential   Email: [email or web address]     PHYSICIAN ADVISORY SERVICES:  FOR [id number] REVIEW - MEDICAL NECESSITY DENIAL  Phone: [phone]  Fax: [phone]  Email: [email or web address]     PRESENTATION DATE: 5/23/2022  4:44 AM  OBERVATION ADMISSION DATE:   INPATIENT ADMISSION DATE: 5/23/22  6:32 AM   DISCHARGE DATE: 5/30/2022  5:36 PM  DISPOSITION: Home with [place] with [address] INFORMATION:  Send all requests for admission clinical reviews, approved or denied determinations and any other requests to dedicated fax number below belonging to the campus where the patient is receiving treatment   List of dedicated fax numbers:  [address] DENIALS (Administrative/Medical Necessity) [phone]   [address] (Maternity/NICU/Pediatrics) [phone]   [facility] [phone]   [address] [facility] [phone]   [address] [place] [address] [phone]   [address] [place] [address] [address] [place] [address],[address] [phone]   [facility]  [phone]   [address] [place], [place] [address] [facility] 1000 [facility] [phone]

## 2022-05-31 NOTE — TELEPHONE ENCOUNTER
Called and spoke with patients daughter Pearl Keith   Patient scheduled 06/01/22 at 9:00 am in the [address] [place] [address] office for flores removal

## 2022-06-01 ENCOUNTER — PATIENT OUTREACH (OUTPATIENT)
Dept: FAMILY MEDICINE CLINIC | Facility: CLINIC | Age: 72
End: 2022-06-01

## 2022-06-01 ENCOUNTER — PROCEDURE VISIT (OUTPATIENT)
Dept: UROLOGY | Facility: CLINIC | Age: 72
End: 2022-06-01

## 2022-06-01 ENCOUNTER — TELEPHONE (OUTPATIENT)
Dept: OTHER | Facility: OTHER | Age: 72
End: 2022-06-01

## 2022-06-01 VITALS
HEART RATE: 80 BPM | SYSTOLIC BLOOD PRESSURE: 110 MMHG | RESPIRATION RATE: 20 BRPM | WEIGHT: 129 LBS | DIASTOLIC BLOOD PRESSURE: 60 MMHG | BODY MASS INDEX: 22.86 KG/M2 | HEIGHT: 63 IN

## 2022-06-01 DIAGNOSIS — D49.4 BLADDER TUMOR: Primary | ICD-10-CM

## 2022-06-01 LAB
BACTERIA BLD CULT: NORMAL
BACTERIA BLD CULT: NORMAL

## 2022-06-01 PROCEDURE — 99024 POSTOP FOLLOW-UP VISIT: CPT

## 2022-06-01 NOTE — CASE MANAGEMENT
Case Management Discharge Planning Note    Patient name Radha Fitzgerald  Location [address] 768/-08 MRN [id number]  : 1950 Date 2022       Current Admission Date: 2022  Current Admission Diagnosis:Bladder tumor   Patient Active Problem List    Diagnosis Date Noted    Acute blood loss anemia 2022    Bladder tumor 2022    Gross hematuria 2022    Hydronephrosis of right kidney 2022    Mild cognitive impairment 2021    Subdural hygroma     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 )     CKD (chronic kidney disease) 2021    Hand lesion 2020    Type 2 diabetes mellitus with chronic kidney disease ([facility] ) 10/14/2019    Type 2 diabetes mellitus with diabetic peripheral angiopathy without gangrene, with long-term current use of insulin ([place] [address] ) 2019    Hyperlipidemia 2019    Rash 2018    Medicare annual wellness visit, subsequent 2018    Screening for colon cancer 2018    Hepatitis B core antibody positive 2017    Acute kidney injury superimposed on CKD-4 2017    Thrombocytopenia ([facility] ) 2017    History of CVA (cerebrovascular accident) 2017    Controlled diabetes mellitus with diabetic neuropathy, with long-term current use of insulin (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    Erectile dysfunction of non-organic origin 2013    History of cirrhosis 07/09/2012    Insomnia 05/30/2012    Peripheral neuropathy 05/07/2012      LOS (days): 7  Geometric Mean LOS (GMLOS) (days): 2 10  Days to GMLOS:-5 4     OBJECTIVE:  Risk of Unplanned Readmission Score: 18 07         Current admission status: Inpatient   Preferred Pharmacy:   [address] [place] [id number]  Phone: [phone] Fax: [phone]    Primary Care Provider: Adriana Fagan DO    Primary Insurance: Alvina Flor [facility] REP  Secondary Insurance:     DISCHARGE DETAILS:          Comments - Freedom of Choice: Received TC from Pt's daughter stating that [address] [place] [address] OF [place], [facility]  left her father a voicemail that they could not accept the patients case  Other Referral/Resources/Interventions Provided:  Referral Comments: TC from patients daughter  [facility] left VM  that they cannot accept the patient becuse the patient is "too complicated"  CM sent addtional referrals [facility] can accept  CM called Cynthia supervisor with [address] [place] [address] and notifed that CM was not notified about them declining the patient   CM updated Susanna that [place] will call to scheduale an appointment            Discharge Destination Plan[de-identified] Home with [address]

## 2022-06-01 NOTE — PROGRESS NOTES
6/1/2022    Tino Mckinley is a 74 y o  male  [id number]    Diagnosis:  Chief Complaint     Bladder Mass          Patient presents for flores r removal s/p TURBT on 5/26/2022  with Dr Breann Cartagena:  Patient scheduled 6/10/2022 with Dr Jonatan Brennan for pathology review  Procedure: Flores removed after deflation of intact balloon without incident  Patient tolerated procedure well      Vitals:    06/01/22 0856   BP: 110/60   Pulse: 80   Resp: 20   Weight: 58 5 kg (129 lb)   Height: 5' 3" (1 6 m)         Grisel Avelar RN

## 2022-06-01 NOTE — PROGRESS NOTES
Call to patient, introduced myself  Patient scheduled to see PCP tomorrow at 9am   This outpatient care manager will meet with patient before or after visit to discuss complex care management services  He agrees to same

## 2022-06-01 NOTE — TELEPHONE ENCOUNTER
Patients daughter is requesting a call back in regard to the patients nephrostomy, she stated the visiting nurse declined the case because it is too complicated  Case management advised her to call the office to follow up to see if the patient should be seen sooner or what should be the next step  Please call Roger Irene to discuss

## 2022-06-02 ENCOUNTER — OFFICE VISIT (OUTPATIENT)
Dept: FAMILY MEDICINE CLINIC | Facility: CLINIC | Age: 72
End: 2022-06-02
Payer: COMMERCIAL

## 2022-06-02 ENCOUNTER — PATIENT OUTREACH (OUTPATIENT)
Dept: FAMILY MEDICINE CLINIC | Facility: CLINIC | Age: 72
End: 2022-06-02

## 2022-06-02 VITALS
SYSTOLIC BLOOD PRESSURE: 126 MMHG | TEMPERATURE: 97.6 F | OXYGEN SATURATION: 99 % | HEART RATE: 76 BPM | DIASTOLIC BLOOD PRESSURE: 60 MMHG | RESPIRATION RATE: 16 BRPM | HEIGHT: 63 IN | WEIGHT: 128.6 LBS | BODY MASS INDEX: 22.79 KG/M2

## 2022-06-02 DIAGNOSIS — N18.9 ACUTE KIDNEY INJURY SUPERIMPOSED ON CKD (HCC): ICD-10-CM

## 2022-06-02 DIAGNOSIS — D62 ACUTE BLOOD LOSS ANEMIA: ICD-10-CM

## 2022-06-02 DIAGNOSIS — Z09 HOSPITAL DISCHARGE FOLLOW-UP: Primary | ICD-10-CM

## 2022-06-02 DIAGNOSIS — N13.30 HYDRONEPHROSIS OF RIGHT KIDNEY: ICD-10-CM

## 2022-06-02 DIAGNOSIS — N17.9 ACUTE KIDNEY INJURY SUPERIMPOSED ON CKD (HCC): ICD-10-CM

## 2022-06-02 DIAGNOSIS — K70.30 ALCOHOLIC CIRRHOSIS OF LIVER WITHOUT ASCITES (HCC): ICD-10-CM

## 2022-06-02 DIAGNOSIS — R31.0 GROSS HEMATURIA: ICD-10-CM

## 2022-06-02 DIAGNOSIS — D49.4 BLADDER TUMOR: ICD-10-CM

## 2022-06-02 DIAGNOSIS — R32 URINARY INCONTINENCE, UNSPECIFIED TYPE: ICD-10-CM

## 2022-06-02 LAB
PSA FREE MFR SERPL: 15 %
PSA FREE SERPL-MCNC: 0.03 NG/ML
PSA SERPL-MCNC: 0.2 NG/ML (ref 0–4)

## 2022-06-02 PROCEDURE — 99496 TRANSJ CARE MGMT HIGH F2F 7D: CPT | Performed by: FAMILY MEDICINE

## 2022-06-02 PROCEDURE — 1111F DSCHRG MED/CURRENT MED MERGE: CPT | Performed by: FAMILY MEDICINE

## 2022-06-02 PROCEDURE — 3066F NEPHROPATHY DOC TX: CPT | Performed by: INTERNAL MEDICINE

## 2022-06-02 RX ORDER — UNDERPADS 23" X 36"
EACH MISCELLANEOUS 4 TIMES DAILY
Qty: 120 EACH | Refills: 5 | Status: SHIPPED | OUTPATIENT
Start: 2022-06-02 | End: 2022-07-02

## 2022-06-02 NOTE — TELEPHONE ENCOUNTER
Called and left voicemail message for patients daughter to return call to discuss Glo Ruvalcaba PA-C's note

## 2022-06-02 NOTE — PROGRESS NOTES
Met with patient and wife in PCP office this morning  Introduced myself and outpatient   Provided our contact number  Wife agrees to outreach telephone calls from this RN  Patient is very pleasant  Has Nephrostomy tube in place connected to straight drainage bag with blood tinged urine visible  His flores catheter was removed yesterday and as per patient he is able to pass urine  Ambulates with steady gait without an assistive device  Wife is supportive  They live together in a two story home in [facility] along with their disabled daughter  Wife also cares for her elderly mother who receives waiver services  Pt and wife's income is over $2000 00 but did not provide specific amount  They are interested in waiver services for patient  Her daughter is contacting [facility]  Wife shared that they are to have home health visit today at noon from [address] for assistance with care of nephrostomy tube  Pt with newly diagnosed bladder mass  History of liver transplant  He is to follow up with Gastroenterologist and Urology  Has appointments already scheduled  Wife is supportive and assists with managing her husbands care, medications and transportation  She expressed that she is afraid he will "fall" when he is left unsupervised as he has had falls in the past   They do have cameras in the home but expressed interest in a life alert button  Provided information on [facility] life alert button system  Reviewed home safety measures  Also provided them the [address] in [address] for additional in home and community resources  Both appreciative for information  We discussed a diet high in protein for Rogers, lean meats, chicken, fish, beans  Denies further questions at this time  Will follow up in one week

## 2022-06-02 NOTE — PROGRESS NOTES
Assessment/Plan:    1  Hospital discharge follow-up    2  Hydronephrosis of right kidney  Assessment & Plan:  PCA placed  Drainage is blood tinged  Seeing urology next week      3  Bladder tumor  Assessment & Plan:  Pathology still pending    Orders:  -     Incontinence Supply Disposable (Incontinence Brief Medium) MISC; Use 4 (four) times a day    4  Gross hematuria  Assessment & Plan:  Secondary to bladder tumor      5  Acute kidney injury superimposed on CKD-4  Assessment & Plan:  Lab Results   Component Value Date    EGFR 23 05/30/2022    EGFR 22 05/29/2022    EGFR 21 05/28/2022    CREATININE 2 54 (H) 05/30/2022    CREATININE 2 70 (H) 05/29/2022    CREATININE 2 81 (H) 05/28/2022   improved  Will recheck     Orders:  -     Comprehensive metabolic panel; Future; Expected date: 06/02/2022    6  Acute blood loss anemia  -     CBC and differential; Future; Expected date: 06/02/2022    7  Urinary incontinence, unspecified type  -     Incontinence Supply Disposable (Incontinence Brief Medium) MISC; Use 4 (four) times a day    8  Alcoholic cirrhosis of liver without ascites ([facility] )  Assessment & Plan:  S/p transplant  On prograf            There are no Patient Instructions on file for this visit  No follow-ups on file  Subjective:      Patient ID: Vero Curtis is a 74 y o  male      Chief Complaint   Patient presents with    Transition of Care Management     Patient here for TCM Discharged on 05/30/2022 [facility] Bladder mass       Here with wife for hospital follow up  Was in his usual state of health until 2am when he woke up with gross hematuria and blood clots  Went to the ER and was admitted  Diagnosed with bladder tumor which was resected  Tumor caused hydronephrosis and neprostomy tube was placed  Webb catether pulled yesterday  Has some incontience  Significant blood loss  Was given 1 unit  Prograf level had dropped slightly      TCM Call (since 5/2/2022)     Date and time call was made  5/31/2022 4:18 PM    Patient [facility] hospitial[facility] at  [facility]    Date of Admission  05/23/22    Date of discharge  05/30/22    Diagnosis  bladder tumor    Disposition  Home    Were the patients medications reviewed and updated  No    Current Symptoms  Pain with urination    Pain with urination severity  Mild    Pain with urination  Gradual      TCM Call (since 5/2/2022)     Should patient be enrolled in anticoag monitoring? Yes    Scheduled for follow up? Yes    Did you obtain your prescribed medications  Yes    Do you need help managing your prescriptions or medications  No    Is transportation to your appointment needed  No    I have advised the patient to call PCP with any new or worsening symptoms  MR Shellie    Living Arrangements  Family members; Children; Spouse or Significiant other    Are you recieving any outpatient services  No    Are you recieving home care services  Yes    Types of home care services  Nurse visit    Are you using any community resources  No    Current waiver services  No    Have you fallen in the last 12 months  Yes    How many times  1    Interperter language line needed  No        The following portions of the patient's history were reviewed and updated as appropriate: allergies, current medications, past family history, past medical history, past social history, past surgical history and problem list     Review of Systems   Constitutional: Negative  HENT: Negative  Eyes: Negative  Respiratory: Negative  Cardiovascular: Negative  Gastrointestinal: Positive for abdominal pain  Endocrine: Negative  Genitourinary: Positive for hematuria  Musculoskeletal: Negative  Skin: Negative  Allergic/Immunologic: Negative  Neurological: Negative  Hematological: Negative  Psychiatric/Behavioral: Negative            Current Outpatient Medications   Medication Sig Dispense Refill    acetaminophen (TYLENOL) 500 mg tablet Take 1 tablet (500 mg total) by mouth every 6 (six) hours as needed for mild pain 30 tablet 0    clopidogrel (PLAVIX) 75 mg tablet Take 1 tablet (75 mg total) by mouth daily 90 tablet 3    Comfort EZ Pen Needles 32G X 4 MM MISC USE 3-4 AS DIRECTED 100 each 5    Incontinence Supply Disposable (Incontinence Brief Medium) MISC Use 4 (four) times a day 120 each 5    insulin detemir (Levemir FlexTouch) 100 Units/mL injection pen Inject 18 Units under the skin daily at bedtime 15 mL 0    INSULIN SYRINGE 1CC/29G 29G X 1/2" 1 ML MISC by Does not apply route      Lancet Devices (Lancing Device) MISC USE AS DIRECTED 1 each 0    Lancets MISC by Does not apply route      OneTouch Ultra test strip TEST UP TO 3 TIMES A  each 0    pregabalin (LYRICA) 50 mg capsule TAKE 1 CAPSULE (50 MG TOTAL) BY MOUTH 3 (THREE) TIMES A DAY 90 capsule 5    rosuvastatin (CRESTOR) 40 MG tablet TAKE ONE (1) TABLET BY MOUTH DAILY 30 tablet 5    sodium chloride, PF, 0 9 % 10 mL by Intracatheter route daily Intracatheter flushing daily  May substitute prefilled syringe with normal saline 10 mL vials, 10 mL syringes, and 18 g blunt needles 300 mL 2    tacrolimus (PROGRAF) 1 mg capsule Take 1 capsule (1 mg total) by mouth every 12 (twelve) hours 180 capsule 3    tamsulosin (FLOMAX) 0 4 mg TAKE 1 CAPSULE(0 4 MG) BY MOUTH DAILY WITH DINNER 90 capsule 6    temazepam (RESTORIL) 30 mg capsule TAKE 1 CAPSULE (30 MG TOTAL) BY MOUTH DAILY AT BEDTIME 30 capsule 5    zolpidem (AMBIEN) 10 mg tablet Take 1 tablet (10 mg total) by mouth daily at bedtime 30 tablet 5    triamcinolone (KENALOG) 0 1 % cream APPLY TO AFFECTED AREA TWICE DAILY (Patient not taking: No sig reported) 80 g 5     No current facility-administered medications for this visit         Objective:    /60 (BP Location: Left arm, Patient Position: Sitting, Cuff Size: Standard)   Pulse 76   Temp 97 6 °F (36 4 °C) (Tympanic)   Resp 16   Ht 5' 3" (1 6 m)   Wt 58 3 kg (128 lb 9 6 oz)   SpO2 99%   BMI 22 78 kg/m²        Physical Exam  Vitals and nursing note reviewed  Constitutional:       Appearance: Normal appearance  HENT:      Head: Normocephalic and atraumatic  Eyes:      Extraocular Movements: Extraocular movements intact  Pupils: Pupils are equal, round, and reactive to light  Cardiovascular:      Rate and Rhythm: Normal rate and regular rhythm  Pulses: Normal pulses  Heart sounds: Normal heart sounds  Pulmonary:      Effort: Pulmonary effort is normal       Breath sounds: Normal breath sounds  Abdominal:      Tenderness: There is abdominal tenderness  Musculoskeletal:      Cervical back: Normal range of motion and neck supple  Skin:     Capillary Refill: Capillary refill takes less than 2 seconds  Neurological:      General: No focal deficit present  Mental Status: He is alert and oriented to person, place, and time  Psychiatric:         Mood and Affect: Mood normal          Behavior: Behavior normal          Thought Content:  Thought content normal          Judgment: Judgment normal                 Alina Still DO

## 2022-06-02 NOTE — TELEPHONE ENCOUNTER
D/w zp- plan Maintain PCN to gravity, seeing zp 6/10 for pathology review   Does not need VNA nor daily flushing, just leave the tube open to gravity bag

## 2022-06-02 NOTE — TELEPHONE ENCOUNTER
Contacted Leanna via phone number listed on communication consent and relayed Terri Crowley PA-C's recommendation which was discussed with Dr Siomara Aggarwal  She verbalized understanding and states VNA did end up coming out to patients home today

## 2022-06-02 NOTE — ASSESSMENT & PLAN NOTE
Lab Results   Component Value Date    EGFR 23 05/30/2022    EGFR 22 05/29/2022    EGFR 21 05/28/2022    CREATININE 2 54 (H) 05/30/2022    CREATININE 2 70 (H) 05/29/2022    CREATININE 2 81 (H) 05/28/2022   improved  Will recheck

## 2022-06-03 ENCOUNTER — PATIENT OUTREACH (OUTPATIENT)
Dept: FAMILY MEDICINE CLINIC | Facility: CLINIC | Age: 72
End: 2022-06-03

## 2022-06-03 DIAGNOSIS — E11.51 TYPE 2 DIABETES MELLITUS WITH DIABETIC PERIPHERAL ANGIOPATHY WITHOUT GANGRENE, WITH LONG-TERM CURRENT USE OF INSULIN (HCC): Primary | ICD-10-CM

## 2022-06-03 DIAGNOSIS — Z78.9 NEEDS ASSISTANCE WITH COMMUNITY RESOURCES: Primary | ICD-10-CM

## 2022-06-03 DIAGNOSIS — Z79.4 TYPE 2 DIABETES MELLITUS WITH DIABETIC PERIPHERAL ANGIOPATHY WITHOUT GANGRENE, WITH LONG-TERM CURRENT USE OF INSULIN (HCC): Primary | ICD-10-CM

## 2022-06-03 RX ORDER — FLASH GLUCOSE SCANNING READER
1 EACH MISCELLANEOUS CONTINUOUS
Qty: 1 EACH | Refills: 0 | Status: SHIPPED | OUTPATIENT
Start: 2022-06-03

## 2022-06-03 RX ORDER — FLASH GLUCOSE SENSOR
1 KIT MISCELLANEOUS 4 TIMES DAILY
Qty: 1 EACH | Refills: 5 | Status: SHIPPED | OUTPATIENT
Start: 2022-06-03 | End: 2023-03-20

## 2022-06-03 NOTE — CASE MANAGEMENT
Case Management Progress Note    Patient name Don Alcantar  Location [address] 768/-95 MRN [id number]  : 1950 Date 6/3/2022       LOS (days): 7  Geometric Mean LOS (GMLOS) (days): 2 10  Days to GMLOS:-5 4        OBJECTIVE:        Current admission status: Inpatient  Preferred Pharmacy:   [address] [place], [place] [id number]  Phone: [phone] Fax: [phone]    Primary Care Provider: Yanick Dhaliwal DO    Primary Insurance: [facility]  Secondary Insurance:     PROGRESS NOTE:    Office of aging LOC assessment called into [facility]

## 2022-06-03 NOTE — PROGRESS NOTES
OPCM SW received referral for community assistance for patient  Wife feeling overwhelmed with care of , her elderly mother, and daughter with mental health disability  Chart review revealed note from recent hospital IP CM reporting that LOC assessment called to [facility]  OPCM SW contacted [facility] [phone], spoke with Sondra Rivas, unable to locate a LOC assessment request   LOC assessment requested over the phone for in home services  Call placed to wife to advise of request for LOC assessment with Aging and requested call back to this [facility] to discuss further community services  Awaiting call back

## 2022-06-03 NOTE — PROGRESS NOTES
Left message for wife, listed insurance is [employer] PPO  No copy in chart, will need to call insurance carrier to verify participating DME

## 2022-06-03 NOTE — PROGRESS NOTES
Received call from wife, she has been up all night with spouse, he is urinating frequently and incontinent  Requesting script for disposable briefs  Order placed by PCP, sent to retail pharmacy, they are unable to fill script  Advised this script to be sent to DME  ABIOLA Connelly faxed script to [facility] at [phone]  Denies patient complaints of fever, chills or elevated blood sugar  Reviewed symptoms of infection  Using teachback symptoms to observe for and when to call the physician  Inquiring about Flomax script, she states she does not have in home  Advised to contact urology to request script  Wife reports home health nurse discussed a glucose monitoring kit that he would not have to stick his finger for  Will send request to PCP for Frank glucose reader and sensor  Reviewed upcoming appointments with wife, to see urology 6/10/22 at 11:30 am   Wife to contact transplant team, pt has an appointment 6/16/22, will call to see if they need a sooner appointment  Supportive listening with wife, tearful, expressed she is overwhelmed with the multiple appointments as well as lack of rest  She is the caregiver for her spouse, disabled daughter and her elderly mother  Discussed referral to outpatient , she agrees to placing referral   All questions answered at this time

## 2022-06-09 ENCOUNTER — HOSPITAL ENCOUNTER (INPATIENT)
Facility: HOSPITAL | Age: 72
LOS: 2 days | Discharge: HOME HEALTH | DRG: 690 | End: 2022-06-11
Attending: EMERGENCY MEDICINE | Admitting: HOSPITALIST
Payer: COMMERCIAL

## 2022-06-09 ENCOUNTER — PATIENT OUTREACH (OUTPATIENT)
Dept: FAMILY MEDICINE CLINIC | Facility: CLINIC | Age: 72
End: 2022-06-09

## 2022-06-09 ENCOUNTER — APPOINTMENT (EMERGENCY)
Dept: RADIOLOGY | Facility: HOSPITAL | Age: 72
DRG: 690 | End: 2022-06-09
Payer: COMMERCIAL

## 2022-06-09 ENCOUNTER — TELEPHONE (OUTPATIENT)
Dept: UROLOGY | Facility: CLINIC | Age: 72
End: 2022-06-09

## 2022-06-09 ENCOUNTER — NURSE TRIAGE (OUTPATIENT)
Dept: OTHER | Facility: OTHER | Age: 72
End: 2022-06-09

## 2022-06-09 DIAGNOSIS — E78.2 MIXED HYPERLIPIDEMIA: ICD-10-CM

## 2022-06-09 DIAGNOSIS — C67.0 MALIGNANT NEOPLASM OF TRIGONE OF URINARY BLADDER (HCC): ICD-10-CM

## 2022-06-09 DIAGNOSIS — N30.00 ACUTE CYSTITIS WITHOUT HEMATURIA: Primary | ICD-10-CM

## 2022-06-09 DIAGNOSIS — N32.89 BLADDER MASS: ICD-10-CM

## 2022-06-09 DIAGNOSIS — N28.85: Primary | ICD-10-CM

## 2022-06-09 DIAGNOSIS — N40.1 BENIGN LOCALIZED PROSTATIC HYPERPLASIA WITH LOWER URINARY TRACT SYMPTOMS (LUTS): ICD-10-CM

## 2022-06-09 DIAGNOSIS — Z93.6 NEPHROSTOMY STATUS (HCC): ICD-10-CM

## 2022-06-09 DIAGNOSIS — N39.0 UTI (URINARY TRACT INFECTION): ICD-10-CM

## 2022-06-09 DIAGNOSIS — R50.9 FEVER: ICD-10-CM

## 2022-06-09 DIAGNOSIS — R53.83 FATIGUE: ICD-10-CM

## 2022-06-09 LAB
ALBUMIN SERPL BCP-MCNC: 2.9 G/DL (ref 3.5–5)
ALP SERPL-CCNC: 89 U/L (ref 46–116)
ALT SERPL W P-5'-P-CCNC: 21 U/L (ref 12–78)
ANION GAP SERPL CALCULATED.3IONS-SCNC: 5 MMOL/L (ref 4–13)
APTT PPP: 34 SECONDS (ref 23–37)
AST SERPL W P-5'-P-CCNC: 29 U/L (ref 5–45)
BACTERIA UR QL AUTO: ABNORMAL /HPF
BASOPHILS # BLD AUTO: 0.05 THOUSANDS/ΜL (ref 0–0.1)
BASOPHILS NFR BLD AUTO: 0 % (ref 0–1)
BILIRUB SERPL-MCNC: 0.4 MG/DL (ref 0.2–1)
BILIRUB UR QL STRIP: NEGATIVE
BUN SERPL-MCNC: 34 MG/DL (ref 5–25)
CALCIUM ALBUM COR SERPL-MCNC: 9.6 MG/DL (ref 8.3–10.1)
CALCIUM SERPL-MCNC: 8.7 MG/DL (ref 8.3–10.1)
CHLORIDE SERPL-SCNC: 104 MMOL/L (ref 100–108)
CLARITY UR: CLEAR
CO2 SERPL-SCNC: 25 MMOL/L (ref 21–32)
COLOR UR: ABNORMAL
CREAT SERPL-MCNC: 2.65 MG/DL (ref 0.6–1.3)
EOSINOPHIL # BLD AUTO: 0.08 THOUSAND/ΜL (ref 0–0.61)
EOSINOPHIL NFR BLD AUTO: 1 % (ref 0–6)
ERYTHROCYTE [DISTWIDTH] IN BLOOD BY AUTOMATED COUNT: 15.9 % (ref 11.6–15.1)
GFR SERPL CREATININE-BSD FRML MDRD: 22 ML/MIN/1.73SQ M
GLUCOSE SERPL-MCNC: 136 MG/DL (ref 65–140)
GLUCOSE UR STRIP-MCNC: NEGATIVE MG/DL
HCT VFR BLD AUTO: 30.8 % (ref 36.5–49.3)
HGB BLD-MCNC: 9.5 G/DL (ref 12–17)
HGB UR QL STRIP.AUTO: ABNORMAL
IMM GRANULOCYTES # BLD AUTO: 0.06 THOUSAND/UL (ref 0–0.2)
IMM GRANULOCYTES NFR BLD AUTO: 0 % (ref 0–2)
INR PPP: 1.18 (ref 0.84–1.19)
KETONES UR STRIP-MCNC: NEGATIVE MG/DL
LACTATE SERPL-SCNC: 1.1 MMOL/L (ref 0.5–2)
LEUKOCYTE ESTERASE UR QL STRIP: ABNORMAL
LYMPHOCYTES # BLD AUTO: 1.4 THOUSANDS/ΜL (ref 0.6–4.47)
LYMPHOCYTES NFR BLD AUTO: 10 % (ref 14–44)
MCH RBC QN AUTO: 24.4 PG (ref 26.8–34.3)
MCHC RBC AUTO-ENTMCNC: 30.8 G/DL (ref 31.4–37.4)
MCV RBC AUTO: 79 FL (ref 82–98)
MONOCYTES # BLD AUTO: 0.99 THOUSAND/ΜL (ref 0.17–1.22)
MONOCYTES NFR BLD AUTO: 7 % (ref 4–12)
NEUTROPHILS # BLD AUTO: 11.91 THOUSANDS/ΜL (ref 1.85–7.62)
NEUTS SEG NFR BLD AUTO: 82 % (ref 43–75)
NITRITE UR QL STRIP: NEGATIVE
NON-SQ EPI CELLS URNS QL MICRO: ABNORMAL /HPF
NRBC BLD AUTO-RTO: 0 /100 WBCS
PH UR STRIP.AUTO: 7 [PH]
PLATELET # BLD AUTO: 164 THOUSANDS/UL (ref 149–390)
PMV BLD AUTO: 12.8 FL (ref 8.9–12.7)
POTASSIUM SERPL-SCNC: 5.2 MMOL/L (ref 3.5–5.3)
PROT SERPL-MCNC: 7.8 G/DL (ref 6.4–8.2)
PROT UR STRIP-MCNC: ABNORMAL MG/DL
PROTHROMBIN TIME: 14.6 SECONDS (ref 11.6–14.5)
RBC # BLD AUTO: 3.9 MILLION/UL (ref 3.88–5.62)
RBC #/AREA URNS AUTO: ABNORMAL /HPF
SODIUM SERPL-SCNC: 134 MMOL/L (ref 136–145)
SP GR UR STRIP.AUTO: 1.01 (ref 1–1.03)
UROBILINOGEN UR STRIP-ACNC: <2 MG/DL
WBC # BLD AUTO: 14.49 THOUSAND/UL (ref 4.31–10.16)
WBC #/AREA URNS AUTO: ABNORMAL /HPF

## 2022-06-09 PROCEDURE — 85025 COMPLETE CBC W/AUTO DIFF WBC: CPT

## 2022-06-09 PROCEDURE — 87040 BLOOD CULTURE FOR BACTERIA: CPT

## 2022-06-09 PROCEDURE — 81001 URINALYSIS AUTO W/SCOPE: CPT

## 2022-06-09 PROCEDURE — 87077 CULTURE AEROBIC IDENTIFY: CPT

## 2022-06-09 PROCEDURE — 83605 ASSAY OF LACTIC ACID: CPT

## 2022-06-09 PROCEDURE — G1004 CDSM NDSC: HCPCS

## 2022-06-09 PROCEDURE — 96374 THER/PROPH/DIAG INJ IV PUSH: CPT

## 2022-06-09 PROCEDURE — 87086 URINE CULTURE/COLONY COUNT: CPT

## 2022-06-09 PROCEDURE — 80053 COMPREHEN METABOLIC PANEL: CPT

## 2022-06-09 PROCEDURE — 85610 PROTHROMBIN TIME: CPT

## 2022-06-09 PROCEDURE — 84145 PROCALCITONIN (PCT): CPT

## 2022-06-09 PROCEDURE — 36415 COLL VENOUS BLD VENIPUNCTURE: CPT

## 2022-06-09 PROCEDURE — 87154 CUL TYP ID BLD PTHGN 6+ TRGT: CPT

## 2022-06-09 PROCEDURE — 99284 EMERGENCY DEPT VISIT MOD MDM: CPT

## 2022-06-09 PROCEDURE — 74176 CT ABD & PELVIS W/O CONTRAST: CPT

## 2022-06-09 PROCEDURE — 85730 THROMBOPLASTIN TIME PARTIAL: CPT

## 2022-06-09 PROCEDURE — 99285 EMERGENCY DEPT VISIT HI MDM: CPT | Performed by: EMERGENCY MEDICINE

## 2022-06-09 RX ORDER — ROSUVASTATIN CALCIUM 40 MG/1
TABLET, COATED ORAL
Qty: 30 TABLET | Refills: 5 | Status: SHIPPED | OUTPATIENT
Start: 2022-06-09

## 2022-06-09 RX ADMIN — CEFTRIAXONE SODIUM 1000 MG: 10 INJECTION, POWDER, FOR SOLUTION INTRAVENOUS at 23:30

## 2022-06-09 NOTE — PROGRESS NOTES
Spoke with Ysabel Larson at [facility]  Provided insurance information to obtain [place] sensor and reader  They opened an order request  They will run it through the insurance and contact patient and wife to review details and any out of pocket expense  Once reviewed by insurance they will send an order request via fax to PCP

## 2022-06-09 NOTE — TELEPHONE ENCOUNTER
Called and spoke with patient's daughter Roger Irene  Roger Irene states her father has a low grade temp with chills  Nephrostomy is not draining but patient is voiding  Roger Irene is attending [employer] and plans to visit her father later today  Roger Irene will suggest to her mother to take patient to the ER

## 2022-06-09 NOTE — TELEPHONE ENCOUNTER
Patient's daughter called in to report patient with temperature of 100 0 and chills  Right nephrostomy tube and bag with no urine output; placed during patient's recent hospitalization on 5/23/22  Please follow up with patient  Reason for Disposition   No urine in bag for > 4 hours and catheter is not kinked    Answer Assessment - Initial Assessment Questions  1  TEMPERATURE: "What is the most recent temperature?"  "How was it measured?"       100 0 F    2  ONSET: "When did the fever start?"       Today    3  SYMPTOMS: "Do you have any other symptoms besides the fever?"  (e g , colds, headache, sore throat, earache, cough, rash, diarrhea, vomiting, abdominal pain)      Denies    4  CAUSE: If there are no symptoms, ask: "What do you think is causing the fever?"       Unsure; nephrostom tube intact with no drainage    5  CONTACTS: "Does anyone else in the family have an infection?"      Denies    6  TREATMENT: "What have you done so far to treat this fever?" (e g , medications)      Unknown    7  IMMUNOCOMPROMISE: "Do you have of the following: diabetes, HIV positive, splenectomy, cancer chemotherapy, chronic steroid treatment, transplant patient, etc "      Liver transplant    Answer Assessment - Initial Assessment Questions  1  SYMPTOMS: "What symptoms are you concerned about?"      Right nephrostomy tube with no drainage    2  ONSET:  "When did the symptoms start?"      Post hospitalization since patient has been home    3  FEVER: "Is there a fever?" If Yes, ask: "What is the temperature, how was it measured, and when did it start?"      Yes 100 0F and chills    4  ABDOMINAL PAIN: "Is there any abdominal pain?" (e g , Scale 1-10; or mild, moderate, severe)     Denies    5  URINE COLOR: "What color is the urine?"  "Is there blood present in the urine?" (e g , clear, yellow, cloudy, tea-colored, blood streaks, bright red)      Some blood in nephrostomy bag; voiding through penis    6   ONSET: "When was the catheter inserted?"      Recent hospitalization 5/23/22    7   OTHER SYMPTOMS: "Do you have any other symptoms?" (e g , back pain, bad urine odor)       Denies    Protocols used: URINARY CATHETER SYMPTOMS AND QUESTIONS-ADULT-OH, FEVER-ADULT-[place]

## 2022-06-09 NOTE — TELEPHONE ENCOUNTER
Regarding: nephrostomy tube / slight fever  ----- Message from [facility] SOFIA sent at 6/9/2022  1:20 PM EDT -----  "I am calling about my dad, he was inpatient 2-2 5 weeks ago and he has a nephrostomy tube and he is feeling icky today, a slight fever of 100 and im not sure if he should be seen or what we should do next, we are awaiting a biopsy report "

## 2022-06-09 NOTE — PROGRESS NOTES
Follow up call with wife  Inquired if she had DME that pars with her [employer]  She provided phone number for this CM but was unaware of DME  She reports that Chente Keen is doing"better"  They are interested in obtaining a Frank Glucose system to monitor his blood sugars

## 2022-06-10 ENCOUNTER — PATIENT OUTREACH (OUTPATIENT)
Dept: FAMILY MEDICINE CLINIC | Facility: CLINIC | Age: 72
End: 2022-06-10

## 2022-06-10 ENCOUNTER — TELEPHONE (OUTPATIENT)
Dept: NEPHROLOGY | Facility: CLINIC | Age: 72
End: 2022-06-10

## 2022-06-10 ENCOUNTER — TELEPHONE (OUTPATIENT)
Dept: OTHER | Facility: OTHER | Age: 72
End: 2022-06-10

## 2022-06-10 ENCOUNTER — TELEPHONE (OUTPATIENT)
Dept: HEMATOLOGY ONCOLOGY | Facility: CLINIC | Age: 72
End: 2022-06-10

## 2022-06-10 PROBLEM — R93.5 ABNORMAL CT OF THE ABDOMEN: Status: ACTIVE | Noted: 2022-06-10

## 2022-06-10 PROBLEM — N32.89 BLADDER MASS: Status: ACTIVE | Noted: 2022-06-10

## 2022-06-10 PROBLEM — N18.4 CKD (CHRONIC KIDNEY DISEASE) STAGE 4, GFR 15-29 ML/MIN (HCC): Chronic | Status: ACTIVE | Noted: 2021-03-30

## 2022-06-10 PROBLEM — N18.4 CKD (CHRONIC KIDNEY DISEASE) STAGE 4, GFR 15-29 ML/MIN (HCC): Status: ACTIVE | Noted: 2021-03-30

## 2022-06-10 PROBLEM — Z79.4: Chronic | Status: ACTIVE | Noted: 2017-09-18

## 2022-06-10 PROBLEM — E78.5 HYPERLIPIDEMIA: Chronic | Status: ACTIVE | Noted: 2019-01-24

## 2022-06-10 PROBLEM — R53.1 GENERALIZED WEAKNESS: Status: ACTIVE | Noted: 2022-06-10

## 2022-06-10 PROBLEM — E11.40: Chronic | Status: ACTIVE | Noted: 2017-09-18

## 2022-06-10 PROBLEM — Z93.6 NEPHROSTOMY STATUS (HCC): Status: ACTIVE | Noted: 2022-06-10

## 2022-06-10 PROBLEM — D72.829 LEUKOCYTOSIS: Status: ACTIVE | Noted: 2022-06-10

## 2022-06-10 PROBLEM — N39.0 UTI (URINARY TRACT INFECTION): Status: ACTIVE | Noted: 2022-06-10

## 2022-06-10 PROBLEM — Z94.4 LIVER TRANSPLANT STATUS (HCC): Chronic | Status: ACTIVE | Noted: 2017-09-18

## 2022-06-10 LAB
ANION GAP SERPL CALCULATED.3IONS-SCNC: 8 MMOL/L (ref 4–13)
BUN SERPL-MCNC: 34 MG/DL (ref 5–25)
CALCIUM SERPL-MCNC: 8.4 MG/DL (ref 8.3–10.1)
CHLORIDE SERPL-SCNC: 108 MMOL/L (ref 100–108)
CO2 SERPL-SCNC: 22 MMOL/L (ref 21–32)
CREAT SERPL-MCNC: 2.6 MG/DL (ref 0.6–1.3)
ERYTHROCYTE [DISTWIDTH] IN BLOOD BY AUTOMATED COUNT: 15.9 % (ref 11.6–15.1)
GFR SERPL CREATININE-BSD FRML MDRD: 23 ML/MIN/1.73SQ M
GLUCOSE SERPL-MCNC: 110 MG/DL (ref 65–140)
GLUCOSE SERPL-MCNC: 110 MG/DL (ref 65–140)
GLUCOSE SERPL-MCNC: 184 MG/DL (ref 65–140)
GLUCOSE SERPL-MCNC: 226 MG/DL (ref 65–140)
GLUCOSE SERPL-MCNC: 92 MG/DL (ref 65–140)
GLUCOSE SERPL-MCNC: 96 MG/DL (ref 65–140)
HCT VFR BLD AUTO: 28.8 % (ref 36.5–49.3)
HGB BLD-MCNC: 8.7 G/DL (ref 12–17)
INR PPP: 1.26 (ref 0.84–1.19)
MAGNESIUM SERPL-MCNC: 1.5 MG/DL (ref 1.6–2.6)
MCH RBC QN AUTO: 24 PG (ref 26.8–34.3)
MCHC RBC AUTO-ENTMCNC: 30.2 G/DL (ref 31.4–37.4)
MCV RBC AUTO: 80 FL (ref 82–98)
PLATELET # BLD AUTO: 146 THOUSANDS/UL (ref 149–390)
PMV BLD AUTO: 12.8 FL (ref 8.9–12.7)
POTASSIUM SERPL-SCNC: 4.5 MMOL/L (ref 3.5–5.3)
PROCALCITONIN SERPL-MCNC: 0.28 NG/ML
PROTHROMBIN TIME: 15.3 SECONDS (ref 11.6–14.5)
RBC # BLD AUTO: 3.62 MILLION/UL (ref 3.88–5.62)
SODIUM SERPL-SCNC: 138 MMOL/L (ref 136–145)
WBC # BLD AUTO: 10.34 THOUSAND/UL (ref 4.31–10.16)

## 2022-06-10 PROCEDURE — 99223 1ST HOSP IP/OBS HIGH 75: CPT | Performed by: RADIOLOGY

## 2022-06-10 PROCEDURE — 99222 1ST HOSP IP/OBS MODERATE 55: CPT | Performed by: NURSE PRACTITIONER

## 2022-06-10 PROCEDURE — 99222 1ST HOSP IP/OBS MODERATE 55: CPT | Performed by: UROLOGY

## 2022-06-10 PROCEDURE — 99223 1ST HOSP IP/OBS HIGH 75: CPT | Performed by: HOSPITALIST

## 2022-06-10 PROCEDURE — 80048 BASIC METABOLIC PNL TOTAL CA: CPT | Performed by: HOSPITALIST

## 2022-06-10 PROCEDURE — 82948 REAGENT STRIP/BLOOD GLUCOSE: CPT

## 2022-06-10 PROCEDURE — 77263 THER RADIOLOGY TX PLNG CPLX: CPT | Performed by: RADIOLOGY

## 2022-06-10 PROCEDURE — 85610 PROTHROMBIN TIME: CPT | Performed by: HOSPITALIST

## 2022-06-10 PROCEDURE — 77470 SPECIAL RADIATION TREATMENT: CPT | Performed by: RADIOLOGY

## 2022-06-10 PROCEDURE — 97110 THERAPEUTIC EXERCISES: CPT

## 2022-06-10 PROCEDURE — 97163 PT EVAL HIGH COMPLEX 45 MIN: CPT

## 2022-06-10 PROCEDURE — 85027 COMPLETE CBC AUTOMATED: CPT | Performed by: HOSPITALIST

## 2022-06-10 PROCEDURE — 83735 ASSAY OF MAGNESIUM: CPT | Performed by: HOSPITALIST

## 2022-06-10 PROCEDURE — RECHECK: Performed by: INTERNAL MEDICINE

## 2022-06-10 PROCEDURE — 99222 1ST HOSP IP/OBS MODERATE 55: CPT | Performed by: INTERNAL MEDICINE

## 2022-06-10 RX ORDER — TACROLIMUS 1 MG/1
1 CAPSULE ORAL EVERY 12 HOURS
Status: DISCONTINUED | OUTPATIENT
Start: 2022-06-10 | End: 2022-06-11 | Stop reason: HOSPADM

## 2022-06-10 RX ORDER — TEMAZEPAM 15 MG/1
30 CAPSULE ORAL
Status: DISCONTINUED | OUTPATIENT
Start: 2022-06-10 | End: 2022-06-11 | Stop reason: HOSPADM

## 2022-06-10 RX ORDER — TEMAZEPAM 15 MG/1
30 CAPSULE ORAL
Status: DISCONTINUED | OUTPATIENT
Start: 2022-06-10 | End: 2022-06-10 | Stop reason: SDUPTHER

## 2022-06-10 RX ORDER — SODIUM CHLORIDE 9 MG/ML
90 INJECTION, SOLUTION INTRAVENOUS CONTINUOUS
Status: DISPENSED | OUTPATIENT
Start: 2022-06-10 | End: 2022-06-10

## 2022-06-10 RX ORDER — DOCUSATE SODIUM 100 MG/1
100 CAPSULE, LIQUID FILLED ORAL 2 TIMES DAILY
Status: DISCONTINUED | OUTPATIENT
Start: 2022-06-10 | End: 2022-06-11 | Stop reason: HOSPADM

## 2022-06-10 RX ORDER — ACETAMINOPHEN 325 MG/1
650 TABLET ORAL EVERY 6 HOURS PRN
Status: DISCONTINUED | OUTPATIENT
Start: 2022-06-10 | End: 2022-06-11 | Stop reason: HOSPADM

## 2022-06-10 RX ORDER — INSULIN LISPRO 100 [IU]/ML
1-5 INJECTION, SOLUTION INTRAVENOUS; SUBCUTANEOUS
Status: DISCONTINUED | OUTPATIENT
Start: 2022-06-10 | End: 2022-06-11 | Stop reason: HOSPADM

## 2022-06-10 RX ORDER — ZOLPIDEM TARTRATE 5 MG/1
5 TABLET ORAL
Status: DISCONTINUED | OUTPATIENT
Start: 2022-06-10 | End: 2022-06-11 | Stop reason: HOSPADM

## 2022-06-10 RX ORDER — TAMSULOSIN HYDROCHLORIDE 0.4 MG/1
0.4 CAPSULE ORAL
Status: DISCONTINUED | OUTPATIENT
Start: 2022-06-10 | End: 2022-06-11 | Stop reason: HOSPADM

## 2022-06-10 RX ORDER — ATORVASTATIN CALCIUM 80 MG/1
80 TABLET, FILM COATED ORAL
Refills: 5 | Status: DISCONTINUED | OUTPATIENT
Start: 2022-06-10 | End: 2022-06-10

## 2022-06-10 RX ORDER — MAGNESIUM SULFATE HEPTAHYDRATE 40 MG/ML
2 INJECTION, SOLUTION INTRAVENOUS ONCE
Status: COMPLETED | OUTPATIENT
Start: 2022-06-10 | End: 2022-06-10

## 2022-06-10 RX ORDER — HEPARIN SODIUM 5000 [USP'U]/ML
5000 INJECTION, SOLUTION INTRAVENOUS; SUBCUTANEOUS EVERY 8 HOURS SCHEDULED
Status: DISCONTINUED | OUTPATIENT
Start: 2022-06-10 | End: 2022-06-11 | Stop reason: HOSPADM

## 2022-06-10 RX ORDER — CLOPIDOGREL BISULFATE 75 MG/1
75 TABLET ORAL DAILY
Status: DISCONTINUED | OUTPATIENT
Start: 2022-06-10 | End: 2022-06-11 | Stop reason: HOSPADM

## 2022-06-10 RX ORDER — ONDANSETRON 2 MG/ML
4 INJECTION INTRAMUSCULAR; INTRAVENOUS EVERY 6 HOURS PRN
Status: DISCONTINUED | OUTPATIENT
Start: 2022-06-10 | End: 2022-06-11 | Stop reason: HOSPADM

## 2022-06-10 RX ORDER — PREGABALIN 50 MG/1
50 CAPSULE ORAL 3 TIMES DAILY
Status: DISCONTINUED | OUTPATIENT
Start: 2022-06-10 | End: 2022-06-11 | Stop reason: HOSPADM

## 2022-06-10 RX ADMIN — MAGNESIUM SULFATE HEPTAHYDRATE 2 G: 40 INJECTION, SOLUTION INTRAVENOUS at 09:25

## 2022-06-10 RX ADMIN — INSULIN LISPRO 1 UNITS: 100 INJECTION, SOLUTION INTRAVENOUS; SUBCUTANEOUS at 17:17

## 2022-06-10 RX ADMIN — TEMAZEPAM 30 MG: 15 CAPSULE ORAL at 22:21

## 2022-06-10 RX ADMIN — DOCUSATE SODIUM 100 MG: 100 CAPSULE, LIQUID FILLED ORAL at 09:25

## 2022-06-10 RX ADMIN — TACROLIMUS 1 MG: 1 CAPSULE ORAL at 12:58

## 2022-06-10 RX ADMIN — SODIUM CHLORIDE 90 ML/HR: 0.9 INJECTION, SOLUTION INTRAVENOUS at 01:00

## 2022-06-10 RX ADMIN — INSULIN LISPRO 2 UNITS: 100 INJECTION, SOLUTION INTRAVENOUS; SUBCUTANEOUS at 22:21

## 2022-06-10 RX ADMIN — PREGABALIN 50 MG: 50 CAPSULE ORAL at 09:25

## 2022-06-10 RX ADMIN — TAMSULOSIN HYDROCHLORIDE 0.4 MG: 0.4 CAPSULE ORAL at 17:17

## 2022-06-10 RX ADMIN — TACROLIMUS 1 MG: 1 CAPSULE ORAL at 22:21

## 2022-06-10 RX ADMIN — INSULIN DETEMIR 18 UNITS: 100 INJECTION, SOLUTION SUBCUTANEOUS at 22:25

## 2022-06-10 RX ADMIN — HEPARIN SODIUM 5000 UNITS: 5000 INJECTION INTRAVENOUS; SUBCUTANEOUS at 01:01

## 2022-06-10 RX ADMIN — TEMAZEPAM 30 MG: 15 CAPSULE ORAL at 01:24

## 2022-06-10 RX ADMIN — CLOPIDOGREL BISULFATE 75 MG: 75 TABLET ORAL at 09:25

## 2022-06-10 RX ADMIN — PREGABALIN 50 MG: 50 CAPSULE ORAL at 22:21

## 2022-06-10 RX ADMIN — HEPARIN SODIUM 5000 UNITS: 5000 INJECTION INTRAVENOUS; SUBCUTANEOUS at 14:24

## 2022-06-10 RX ADMIN — DEXTROSE 1000 MG: 50 INJECTION, SOLUTION INTRAVENOUS at 22:35

## 2022-06-10 RX ADMIN — HEPARIN SODIUM 5000 UNITS: 5000 INJECTION INTRAVENOUS; SUBCUTANEOUS at 06:11

## 2022-06-10 RX ADMIN — ACETAMINOPHEN 650 MG: 325 TABLET, FILM COATED ORAL at 01:01

## 2022-06-10 RX ADMIN — HEPARIN SODIUM 5000 UNITS: 5000 INJECTION INTRAVENOUS; SUBCUTANEOUS at 22:21

## 2022-06-10 RX ADMIN — PREGABALIN 50 MG: 50 CAPSULE ORAL at 17:17

## 2022-06-10 NOTE — ED ATTENDING ATTESTATION
Final Diagnosis:  1  Infection of renal pelvis and ureter    2  Fever    3  Fatigue    4  Malignant neoplasm of trigone of urinary bladder (HCC)    5  UTI (urinary tract infection)    6  Nephrostomy status ([facility] )    7  Bladder mass      ED Course as of 06/12/22 0857   Thu Jun 09, 2022 2157 Procedure Note for Ultrasound Guided Peripheral Line:  Skin prepared using Chloraprep  In the RIGHT basilic basilic vein, using ultrasound guidance (CPT code 54189), an 18G peripheral IV long angiocatheter was placed successfully by physician secondary to difficulty placing IV by nursing staff  Successful  One attempt  Good blood return  Good palpable flush  Adhered to skin using Tegederdawood Petty MD, saw and evaluated the patient  All available labs and X-rays were ordered by me or the resident and have been reviewed by myself  I discussed the patient with the resident / non-physician and agree with the resident's / non-physician practitioner's findings and plan as documented in the resident's / non-physician practicitioner's note, except where noted  At this point, I agree with the current assessment done in the ED  I was present during key portions of all procedures performed unless otherwise stated  Chief Complaint   Patient presents with    Fever - 9 weeks to 74 years     Pt has been having a fever today  Has a drain on kidney  Recent urinary catheter removal   Decreased appetite  More tired than normal        This is a 74 y o  male presenting for evaluation of fever, maybe tired and decreased appetite for 3 days  The wife has noticed no drainage of urine from the RIGHT-sided nephrostomy tube that was placed 2 weeks ago  Patient is feels unwell    In last 3 days has been increased bloody discharge from around the catheter insertion site    PMH:   has a past medical history of Alcoholism ([facility] ), Cerebral artery aneurysm, Change in mental state, Diabetes mellitus ([address] ), Drug dependence ([facility] ), Fatigue, Hepatitis C, Hospital discharge follow-up (12/21/2018), Kidney disease, 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[facility] ), Liver transplant recipient [facility]), Renal disorder, Subdural hygroma, and Thrombocytopenia ([facility] ) (9/20/2017)  PSH:   has a past surgical history that includes Liver transplantation; Brain surgery (02/12/2014); Cataract extraction; Cholecystectomy; Rotator cuff repair; Shoulder surgery; IR PICC line (12/14/2018); [place] lumbar puncture diagnostic (12/14/2018); Transurethral resection of bladder tumor (N/A, 5/26/2022); and IR nephrostomy tube placement (5/28/2022)  Social:  Social History     Substance and Sexual Activity   Alcohol Use Not Currently     Social History     Tobacco Use   Smoking Status Never Smoker   Smokeless Tobacco Never Used   Tobacco Comment    former smoker per allscripts      Social History     Substance and Sexual Activity   Drug Use No    Comment: remotely quit drug use per allscripts      PE:  Vitals:    06/10/22 1508 06/10/22 2006 06/10/22 2050 06/11/22 0720   BP: 160/79  157/80 106/57   BP Location:    Right arm   Pulse: 73  76 66   Resp: 16  16 16   Temp: 98 2 °F (36 8 °C)  98 2 °F (36 8 °C) 98 3 °F (36 8 °C)   TempSrc:    Oral   SpO2: 100% 97% 99% 99%   Weight:       Height:       General: VSS, NAD, awake, alert  Well-nourished, well-developed  Appears stated age  Head: Normocephalic, atraumatic, nontender  Eyes: PERRL, EOM-I  No diplopia  No hyphema  No subconjunctival hemorrhages  Symmetrical lids  ENTAtraumatic external nose and ears  MMM  No stridor  Normal phonation  No drooling  Base of mouth is soft  No mastoid tenderness  Neck: Symmetric, trachea midline  No JVD  CV: Peripheral pulses +2 throughout  No chest wall tenderness  Lungs:   Unlabored   No retractions  No crepitus  No tachypnea  No paradoxical motion    Abd: +BS, soft, NT/ND    MSK:   FROM   Back:   RIGHT-upper back has a nephrostomy tube  No drainage present into the urinary bag  I turned the stop cock and bloody output came  There's bloody leakage around the catheter insertion site itself  CVAT  Skin: Dry, intact  Neuro: AAOx3, GCS 15, CN II-XII grossly intact  Motor grossly intact  Psychiatric/Behavioral: Appropriate mood and affect   Exam: deferred  A:  - hydronephrosis  P:  - I opened the tube  - check for infection  - imaging for complications / tube check  - dispo    - 13 point ROS was performed and all are normal unless stated in the history above  - Nursing note reviewed  Vitals reviewed  - Orders placed by myself and/or advanced practitioner / resident     - Previous chart was reviewed  - No language barrier    - History obtained from patient tamika  - There are no limitations to the history obtained  - Critical care time: Not applicable for this patient  Code Status: Prior  Advance Directive and Living Will:      Power of :    POLST:      Medications   sodium chloride 0 9 % infusion (0 mL/hr Intravenous Stopped 6/10/22 1159)   ceftriaxone (ROCEPHIN) 1 g/50 mL in dextrose IVPB (0 mg Intravenous Stopped 6/10/22 0012)   magnesium sulfate 2 g/50 mL IVPB (premix) 2 g (2 g Intravenous New Bag 6/10/22 0925)     CT abdomen pelvis wo contrast   Final Result   Right percutaneous nephrostomy tube appears to terminate within the right renal pelvis with right hydronephrosis and a small focus of gas within the upper pole collecting system worrisome for infection unless there has been recent instrumentation  Correlate with urinalysis  Asymmetric right posterior bladder wall thickening may reflect treatment related changes after neoplasm resection  Difficult to entirely exclude some residual or recurrent tumor in this region in this unenhanced study        Workstation performed: [id number]           Orders Placed This Encounter   Procedures    Blood culture #1    Blood culture #2    Urine culture    Blood Culture Identification Panel CT abdomen pelvis wo contrast    CBC and differential    Comprehensive metabolic panel    Lactic acid    Procalcitonin    Protime-INR    APTT    Urinalysis with microscopic    Basic metabolic panel    Magnesium    CBC (With Platelets)    Protime-INR    Magnesium    Basic metabolic panel    CBC and differential    EXTERNAL: Ambulatory Referral to [address] [place] [address] Zurdo Lucas    Discharge Diet    Notify admitting physician    Notify admitting physician on arrival    Activity as tolerated    Call provider for:  persistent nausea or vomiting    Call provider for:  severe uncontrolled pain    Call provider for:  persistent dizziness or light-headedness    Call provider for:  extreme fatigue    Call provider for:  difficulty breathing, headache or visual disturbances    Inpatient consult to Urology    Inpatient consult to Oncology    Inpatient consult to Radiation Oncology    Inpatient consult to Palliative Care    Inpatient Admission    Discharge patient     Labs Reviewed   CBC AND DIFFERENTIAL - Abnormal       Result Value Ref Range Status    WBC 14 49 (*) 4 31 - 10 16 Thousand/uL Final    RBC 3 90  3 88 - 5 62 Million/uL Final    Hemoglobin 9 5 (*) 12 0 - 17 0 g/dL Final    Hematocrit 30 8 (*) 36 5 - 49 3 % Final    MCV 79 (*) 82 - 98 fL Final    MCH 24 4 (*) 26 8 - 34 3 pg Final    MCHC 30 8 (*) 31 4 - 37 4 g/dL Final    RDW 15 9 (*) 11 6 - 15 1 % Final    MPV 12 8 (*) 8 9 - 12 7 fL Final    Platelets 661  637 - 390 Thousands/uL Final    nRBC 0  /100 WBCs Final    Neutrophils Relative 82 (*) 43 - 75 % Final    Immat GRANS % 0  0 - 2 % Final    Lymphocytes Relative 10 (*) 14 - 44 % Final    Monocytes Relative 7  4 - 12 % Final    Eosinophils Relative 1  0 - 6 % Final    Basophils Relative 0  0 - 1 % Final    Neutrophils Absolute 11 91 (*) 1 85 - 7 62 Thousands/µL Final    Immature Grans Absolute 0 06  0 00 - 0 20 Thousand/uL Final    Lymphocytes Absolute 1 40  0 60 - 4 47 Thousands/µL Final    Monocytes Absolute 0 99  0 17 - 1 22 Thousand/µL Final    Eosinophils Absolute 0 08  0 00 - 0 61 Thousand/µL Final    Basophils Absolute 0 05  0 00 - 0 10 Thousands/µL Final   COMPREHENSIVE METABOLIC PANEL - Abnormal    Sodium 134 (*) 136 - 145 mmol/L Final    Potassium 5 2  3 5 - 5 3 mmol/L Final    Chloride 104  100 - 108 mmol/L Final    CO2 25  21 - 32 mmol/L Final    ANION GAP 5  4 - 13 mmol/L Final    BUN 34 (*) 5 - 25 mg/dL Final    Creatinine 2 65 (*) 0 60 - 1 30 mg/dL Final    Comment: Standardized to IDMS reference method    Glucose 136  65 - 140 mg/dL Final    Comment: If the patient is fasting, the ADA then defines impaired fasting glucose as > 100 mg/dL and diabetes as > or equal to 123 mg/dL  Specimen collection should occur prior to Sulfasalazine administration due to the potential for falsely depressed results  Specimen collection should occur prior to Sulfapyridine administration due to the potential for falsely elevated results  Calcium 8 7  8 3 - 10 1 mg/dL Final    Corrected Calcium 9 6  8 3 - 10 1 mg/dL Final    AST 29  5 - 45 U/L Final    Comment: Specimen collection should occur prior to Sulfasalazine administration due to the potential for falsely depressed results  ALT 21  12 - 78 U/L Final    Comment: Specimen collection should occur prior to Sulfasalazine and/or Sulfapyridine administration due to the potential for falsely depressed results  Alkaline Phosphatase 89  46 - 116 U/L Final    Total Protein 7 8  6 4 - 8 2 g/dL Final    Albumin 2 9 (*) 3 5 - 5 0 g/dL Final    Total Bilirubin 0 40  0 20 - 1 00 mg/dL Final    Comment: Use of this assay is not recommended for patients undergoing treatment with eltrombopag due to the potential for falsely elevated results      eGFR 22  ml/min/1 73sq m Final    Narrative:     [place] guidelines for Chronic Kidney Disease (CKD):     Stage 1 with normal or high GFR (GFR > 90 mL/min/1 73 square meters)    Stage 2 Mild CKD (GFR = 60-89 mL/min/1 73 square meters)    Stage 3A Moderate CKD (GFR = 45-59 mL/min/1 73 square meters)    Stage 3B Moderate CKD (GFR = 30-44 mL/min/1 73 square meters)    Stage 4 Severe CKD (GFR = 15-29 mL/min/1 73 square meters)    Stage 5 End Stage CKD (GFR <15 mL/min/1 73 square meters)  Note: GFR calculation is accurate only with a steady state creatinine   PROTIME-INR - Abnormal    Protime 14 6 (*) 11 6 - 14 5 seconds Final    INR 1 18  0 84 - 1 19 Final   URINALYSIS WITH MICROSCOPIC - Abnormal    Color, UA Light Yellow   Final    Clarity, UA Clear   Final    Specific [place], UA 1 011  1 003 - 1 030 Final    pH, UA 7 0  4 5, 5 0, 5 5, 6 0, 6 5, 7 0, 7 5, 8 0 Final    Leukocytes, UA Small (*) Negative Final    Nitrite, UA Negative  Negative Final    Protein, UA 50 (1+) (*) Negative mg/dl Final    Glucose, UA Negative  Negative mg/dl Final    Ketones, UA Negative  Negative mg/dl Final    Urobilinogen, UA <2 0  <2 0 mg/dl mg/dl Final    Bilirubin, UA Negative  Negative Final    Blood, UA Small (*) Negative Final    RBC, UA 4-10 (*) None Seen, 1-2 /hpf Final    WBC, UA 4-10 (*) None Seen, 1-2 /hpf Final    Epithelial Cells Occasional  None Seen, Occasional /hpf Final    Bacteria, UA None Seen  None Seen, Occasional /hpf Final   LACTIC ACID, PLASMA - Normal    LACTIC ACID 1 1  0 5 - 2 0 mmol/L Final    Narrative:     Result may be elevated if tourniquet was used during collection     APTT - Normal    PTT 34  23 - 37 seconds Final    Comment: Therapeutic Heparin Range =  60-90 seconds     Time reflects when diagnosis was documented in both MDM as applicable and the Disposition within this note       Time User Action Codes Description Comment    6/9/2022 11:27 PM Elise Donahue Add [N28 85] Infection of renal pelvis and ureter     6/9/2022 11:44 PM Elise Donahue Add [R50 9] Fever     6/9/2022 11:44 PM Elise Donahue Add [R53 83] Fatigue     6/10/2022  8:57 [facility] [C67 0] Malignant neoplasm of trigone of urinary bladder ([facility] )     6/11/2022  9:32 AM Kyung Flores Add [N39 0] UTI (urinary tract infection)     6/11/2022  9:58 AM Kyung Flores Add [Z93 6] Nephrostomy status ([facility] )     6/11/2022  9:58 AM Sandra Fowler Add [N32 89] Bladder mass           ED Disposition       ED Disposition   Admit    Condition   Stable    Date/Time   Thu Jun 9, 2022 11:44 PM    Comment   Case was discussed with Dr Andrea Merino and the patient's admission status was agreed to be Admission Status: inpatient status to the service of Dr Andrea Merino                  Follow-up Information       Follow up With Specialties Details Why Contact Info Additional [address] [facility] Follow up in 3 week(s) Service will contact patient/caregiver with hospital follow-up appointment date and time once discharged [address] [facility] [address]  701  [facility] [address] [place], [place], [address]    900 [facility], [address] Follow up within 1 week [address]   [address] [place] [address] Road  [phone]             Discharge Medication List as of 6/11/2022  9:49 AM        START taking these medications    Details   cephalexin (KEFLEX) 500 mg capsule Take 1 capsule (500 mg total) by mouth every 6 (six) hours for 4 days, Starting Sat 6/11/2022, Until Wed 6/15/2022, Normal           CONTINUE these medications which have NOT CHANGED    Details   acetaminophen (TYLENOL) 500 mg tablet Take 1 tablet (500 mg total) by mouth every 6 (six) hours as needed for mild pain, Starting Sun 5/23/2021, Normal      clopidogrel (PLAVIX) 75 mg tablet Take 1 tablet (75 mg total) by mouth daily, Starting Mon 8/30/2021, Normal      Comfort EZ Pen Needles 32G X 4 MM MISC USE 3-4 AS DIRECTED, Normal      Continuous Blood Gluc  (FreeStyle Frank 14 Day [place]) NATALIA Use 1 Device continuous, Starting Fri 6/3/2022, Normal      Continuous Blood Gluc Sensor (FreeStyle Frank 14 Day Sensor) MISC Use 1 Device 4 (four) times a day, Starting Fri 6/3/2022, Normal      Incontinence Supply Disposable (Incontinence Brief Medium) MISC Use 4 (four) times a day, Starting Thu 6/2/2022, Until Sat 7/2/2022, Normal      insulin detemir (Levemir FlexTouch) 100 Units/mL injection pen Inject 18 Units under the skin daily at bedtime, Starting Mon 5/30/2022, Normal      INSULIN SYRINGE 1CC/29G 29G X 1/2" 1 ML MISC by Does not apply route, Starting Wed 1/11/2012, Historical Med      Lancet Devices (Lancing Device) MISC USE AS DIRECTED, Normal      Lancets MISC by Does not apply route, Starting Fri 5/12/2017, Historical Med      OneTouch Ultra test strip TEST UP TO 3 TIMES A DAY, Normal      pregabalin (LYRICA) 50 mg capsule TAKE 1 CAPSULE (50 MG TOTAL) BY MOUTH 3 (THREE) TIMES A DAY, Starting Mon 5/23/2022, Normal      rosuvastatin (CRESTOR) 40 MG tablet TAKE ONE (1) TABLET BY MOUTH DAILY, Normal      sodium chloride, PF, 0 9 % 10 mL by Intracatheter route daily Intracatheter flushing daily  May substitute prefilled syringe with normal saline 10 mL vials, 10 mL syringes, and 18 g blunt needles, Starting Tue 5/31/2022, Until Mon 8/29/2022, Normal      tacrolimus (PROGRAF) 1 mg capsule Take 1 capsule (1 mg total) by mouth every 12 (twelve) hours, Starting Mon 8/30/2021, Normal      tamsulosin (FLOMAX) 0 4 mg TAKE 1 CAPSULE(0 4 MG) BY MOUTH DAILY WITH DINNER, Normal      temazepam (RESTORIL) 30 mg capsule TAKE 1 CAPSULE (30 MG TOTAL) BY MOUTH DAILY AT BEDTIME, Starting Wed 1/19/2022, Normal      zolpidem (AMBIEN) 10 mg tablet Take 1 tablet (10 mg total) by mouth daily at bedtime, Starting Tue 3/1/2022, Normal           STOP taking these medications       triamcinolone (KENALOG) 0 1 % cream Comments:   Reason for Stopping:             Outpatient Discharge Orders   EXTERNAL: Ambulatory Referral to Home Health   Standing Status: Future Standing Exp   Date: 06/11/23      Discharge Diet     Activity as tolerated     Call provider for:  persistent nausea or vomiting     Call provider for:  severe uncontrolled pain     Call provider for:  persistent dizziness or light-headedness     Call provider for:  extreme fatigue     Call provider for:  difficulty breathing, headache or visual disturbances     Prior to Admission Medications   Prescriptions Last Dose Informant Patient Reported? Taking?    Comfort EZ Pen Needles 32G X 4 MM MISC  Spouse/Significant Other No No   Sig: USE 3-4 AS DIRECTED   Continuous Blood Gluc  (FreeStyle Frank 14 Day [place]) NATALIA   No No   Sig: Use 1 Device continuous   Continuous Blood Gluc Sensor (FreeStyle Frank 14 Day Sensor) [facility]   No No   Sig: Use 1 Device 4 (four) times a day   INSULIN SYRINGE 1CC/29G 29G X 1/2" 1 ML MISC  Spouse/Significant Other Yes No   Sig: by Does not apply route   Incontinence Supply Disposable (Incontinence Brief Medium) MISC   No No   Sig: Use 4 (four) times a day   Lancet Devices (Lancing Device) MISC  Spouse/Significant Other No No   Sig: USE AS DIRECTED   Lancets MISC  Spouse/Significant Other Yes No   Sig: by Does not apply route   OneTouch Ultra test strip  Spouse/Significant Other No No   Sig: TEST UP TO 3 TIMES A DAY   acetaminophen (TYLENOL) 500 mg tablet  Spouse/Significant Other No No   Sig: Take 1 tablet (500 mg total) by mouth every 6 (six) hours as needed for mild pain   clopidogrel (PLAVIX) 75 mg tablet  Spouse/Significant Other No No   Sig: Take 1 tablet (75 mg total) by mouth daily   insulin detemir (Levemir FlexTouch) 100 Units/mL injection pen  Spouse/Significant Other No No   Sig: Inject 18 Units under the skin daily at bedtime   pregabalin (LYRICA) 50 mg capsule  Spouse/Significant Other No No   Sig: TAKE 1 CAPSULE (50 MG TOTAL) BY MOUTH 3 (THREE) TIMES A DAY   rosuvastatin (CRESTOR) 40 MG tablet   No No   Sig: TAKE ONE (1) TABLET BY MOUTH DAILY   sodium chloride, PF, 0 9 %  Spouse/Significant Other No No   Sig: 10 mL by Intracatheter route daily Intracatheter flushing daily  May substitute prefilled syringe with normal saline 10 mL vials, 10 mL syringes, and 18 g blunt needles   tacrolimus (PROGRAF) 1 mg capsule  Spouse/Significant Other No No   Sig: Take 1 capsule (1 mg total) by mouth every 12 (twelve) hours   tamsulosin (FLOMAX) 0 4 mg  Spouse/Significant Other No No   Sig: TAKE 1 CAPSULE(0 4 MG) BY MOUTH DAILY WITH DINNER   temazepam (RESTORIL) 30 mg capsule  Spouse/Significant Other No No   Sig: TAKE 1 CAPSULE (30 MG TOTAL) BY MOUTH DAILY AT BEDTIME   triamcinolone (KENALOG) 0 1 % cream  Spouse/Significant Other No No   Sig: APPLY TO AFFECTED AREA TWICE DAILY   Patient not taking: No sig reported   zolpidem (AMBIEN) 10 mg tablet  Spouse/Significant Other No No   Sig: Take 1 tablet (10 mg total) by mouth daily at bedtime      Facility-Administered Medications: None       Portions of the record may have been created with voice recognition software  Occasional wrong word or "sound a like" substitutions may have occurred due to the inherent limitations of voice recognition software  Read the chart carefully and recognize, using context, where substitutions have occurred      Electronically signed by:  Tomasa Padilla

## 2022-06-10 NOTE — UTILIZATION REVIEW
Initial Clinical Review    Admission: Date/Time/Statement:   Admission Orders (From admission, onward)     Ordered        06/09/22 2345  Inpatient Admission  Once                      Orders Placed This Encounter   Procedures    Inpatient Admission     Standing Status:   Standing     Number of Occurrences:   1     Order Specific Question:   Level of Care     Answer:   Med Surg [16]     Order Specific Question:   Estimated length of stay     Answer:   More than 2 Midnights     Order Specific Question:   Certification     Answer:   I certify that inpatient services are medically necessary for this patient for a duration of greater than two midnights  See H&P and MD Progress Notes for additional information about the patient's course of treatment  ED Arrival Information     Expected   -    Arrival   6/9/2022 19:53    Acuity   Urgent            Means of arrival   Walk-In    Escorted by   Family Member    Service   Hospitalist    Admission type   Urgent            Arrival complaint   Fever            Chief Complaint   Patient presents with    Fever - 9 weeks to 74 years     Pt has been having a fever today  Has a drain on kidney  Recent urinary catheter removal   Decreased appetite  More tired than normal          Initial Presentation: 74 y o  male with PMH of liver transplant, CKD 4, insulin-dependent diabetes presents to [facility] ED via personal vehicle with c/o fever, generalized weakness and decreased po intake for 3 days and nausea  In ED, exam revealed right-sided nephrostomy tube placed 2 wks ago has no drainage  Pt had bladder tumor resection  Cr 2 65, WBC 14 4, urinalysis positive for mild pyuria   CT of abdomen and pelvis impression "Right percutaneous nephrostomy tube appears to terminate within the right renal pelvis with right hydronephrosis and a small focus of gas within the upper pole collecting system worrisome for infection unless there has been recent instrumentation   Asymmetric right posterior bladder wall thickening may reflect treatment related changes after neoplasm resection   Difficult to entirely exclude some residual or recurrent tumor in this region in this unenhanced study "  Pt started on IV ABX in ED  Admit Inpatient med surg d/t generalized weakness, leukocytosis, abnormal CT of abc, nephrostomy, CKD:  Continue IV ABX and IVF, consults to IR, urology, oncology, palliative care  Continue to monitor renal function, continue home meds, accuchecks w/ssi  Date: 6/10   Day 2:    Pt states feeling better  PT/OT consult placed, await urology input, oncology input given findings of "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, radiation oncology consult placed, await palliative care consult, continue IV ABX  S/p nephrostomy tube placement in IR may require IR consult, await urology recommendations  Continue to monitor labs, continue accuchecks w/ssi, continue home meds  6/10 Palliative Care Consult:  Patient currently remains asymptomatic except for his baseline insomnia that he takes medication for he does not feel like he needs any changes to his regimen at this time  Will await further input from our oncology colleagues before discussing goals of care further  PSC follow-up- palliative will follow-up on Monday for ongoing discussions of still hospitalized if not we will arrange outpatient follow-up with our clinic    Code Status:  Full - Level 1    ED Triage Vitals   Temperature Pulse Respirations Blood Pressure SpO2   06/09/22 2008 06/09/22 2008 06/09/22 2008 06/09/22 2008 06/09/22 2008   99 3 °F (37 4 °C) 89 20 134/69 98 %      Temp Source Heart Rate Source Patient Position - Orthostatic VS BP Location FiO2 (%)   06/09/22 2008 06/09/22 2330 06/10/22 0000 06/10/22 0000 --   Oral Monitor Lying Left arm       Pain Score       06/10/22 0101       10 - Worst Possible Pain          Wt Readings from Last 1 Encounters:   06/10/22 58 1 kg (128 lb) Additional Vital Signs:   Date/Time Temp Pulse Resp BP MAP (mmHg) SpO2 O2 Device Patient Position - Orthostatic VS   06/10/22 08:03:14 98 6 °F (37 °C) 61 16 102/58 73 98 % -- --   06/10/22 0138 -- -- -- -- -- 97 % None (Room air) --   06/10/22 00:22:57 98 7 °F (37 1 °C) -- -- 125/62 83 -- -- --   06/10/22 0000 -- 72 16 108/57 76 96 % None (Room air) Lying   06/09/22 2330 -- 76 18 104/65 78 97 % None (Room air) --   06/09/22 2008 99 3 °F (37 4 °C) 89 20 134/69 -- 98 % -- --     Pertinent Labs/Diagnostic Test Results:   CT abdomen pelvis wo contrast   Final Result by Nay Smith MD (06/09 2142)   Right percutaneous nephrostomy tube appears to terminate within the right renal pelvis with right hydronephrosis and a small focus of gas within the upper pole collecting system worrisome for infection unless there has been recent instrumentation  Correlate with urinalysis  Asymmetric right posterior bladder wall thickening may reflect treatment related changes after neoplasm resection  Difficult to entirely exclude some residual or recurrent tumor in this region in this unenhanced study           Results from last 7 days   Lab Units 06/10/22  0505 06/09/22  2222   WBC Thousand/uL 10 34* 14 49*   HEMOGLOBIN g/dL 8 7* 9 5*   HEMATOCRIT % 28 8* 30 8*   PLATELETS Thousands/uL 146* 164   NEUTROS ABS Thousands/µL  --  11 91*         Results from last 7 days   Lab Units 06/10/22  0505 06/09/22  2222   SODIUM mmol/L 138 134*   POTASSIUM mmol/L 4 5 5 2   CHLORIDE mmol/L 108 104   CO2 mmol/L 22 25   ANION GAP mmol/L 8 5   BUN mg/dL 34* 34*   CREATININE mg/dL 2 60* 2 65*   EGFR ml/min/1 73sq m 23 22   CALCIUM mg/dL 8 4 8 7   MAGNESIUM mg/dL 1 5*  --      Results from last 7 days   Lab Units 06/09/22  2222   AST U/L 29   ALT U/L 21   ALK PHOS U/L 89   TOTAL PROTEIN g/dL 7 8   ALBUMIN g/dL 2 9*   TOTAL BILIRUBIN mg/dL 0 40     Results from last 7 days   Lab Units 06/10/22  1138 06/10/22  0735 06/10/22  0112   POC GLUCOSE mg/dl 110 92 110     Results from last 7 days   Lab Units 06/10/22  0505 06/09/22  2222   GLUCOSE RANDOM mg/dL 96 136     Results from last 7 days   Lab Units 06/10/22  0505 06/09/22  2222   PROTIME seconds 15 3* 14 6*   INR  1 26* 1 18   PTT seconds  --  34     Results from last 7 days   Lab Units 06/09/22  2222   PROCALCITONIN ng/ml 0 28*     Results from last 7 days   Lab Units 06/09/22  2222   LACTIC ACID mmol/L 1 1     Results from last 7 days   Lab Units 06/09/22  2228   CLARITY UA  Clear   COLOR UA  Light Yellow   SPEC GRAV UA  1 011   PH UA  7 0   GLUCOSE UA mg/dl Negative   KETONES UA mg/dl Negative   BLOOD UA  Small*   PROTEIN UA mg/dl 50 (1+)*   NITRITE UA  Negative   BILIRUBIN UA  Negative   UROBILINOGEN UA (BE) mg/dl <2 0   LEUKOCYTES UA  Small*   WBC UA /hpf 4-10*   RBC UA /hpf 4-10*   BACTERIA UA /hpf None Seen   EPITHELIAL CELLS WET PREP /hpf Occasional     Results from last 7 days   Lab Units 06/09/22  2222   BLOOD CULTURE  Received in Microbiology Lab  Culture in Progress  Received in Microbiology Lab  Culture in Progress       ED Treatment:   Medication Administration from 06/09/2022 1953 to 06/10/2022 0016       Date/Time Order Dose Route Action     06/09/2022 2330 ceftriaxone (ROCEPHIN) 1 g/50 mL in dextrose IVPB 1,000 mg Intravenous New Bag        Past Medical History:   Diagnosis Date    [facility]ism ([facility] )     Cerebral artery aneurysm     Change in mental state     last assessed 5/18/15; resolved 10/27/15    Diabetes mellitus ([facility] )     Drug dependence ([facility] )     Fatigue     last assessed 1/26/15; resolved 5/24/16    Hepatitis [facility] discharge follow-up 12/21/2018    Kidney disease     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[facility] )     Liver transplant recipient [facility])     Renal disorder     Subdural hygroma     2/27/14; resolved 7/28/15    [facility] 9/20/2017     Present on Admission:   Generalized weakness   CKD (chronic kidney disease) stage 4, GFR 15-29 ml/min [facility])      Admitting Diagnosis: Infection of renal pelvis and ureter [N28 85]  Fatigue [R53 83]  Fever [R50 9]  Age/Sex: 74 y o  male  Admission Orders:  Scheduled Medications:  cefTRIAXone, 1,000 mg, Intravenous, Q24H  clopidogrel, 75 mg, Oral, Daily  docusate sodium, 100 mg, Oral, BID  heparin (porcine), 5,000 Units, Subcutaneous, Q8H JUNIOR  insulin detemir, 18 Units, Subcutaneous, HS  insulin lispro, 1-5 Units, Subcutaneous, TID AC  insulin lispro, 1-5 Units, Subcutaneous, HS  pregabalin, 50 mg, Oral, TID  tacrolimus, 1 mg, Oral, Q12H  tamsulosin, 0 4 mg, Oral, Daily With Dinner  temazepam, 30 mg, Oral, HS      Continuous IV Infusions:     sodium chloride 0 9 % infusion  Rate: 90 mL/hr Dose: 90 mL/hr  Freq: Continuous Route: IV  Last Dose: Stopped (06/10/22 1159)  Start: 06/10/22 0045 End: 06/10/22 1059    PRN Meds:  acetaminophen, 650 mg, Oral, Q6H PRN x1 thus far  ondansetron, 4 mg, Intravenous, Q6H PRN        IP CONSULT TO UROLOGY  IP CONSULT TO ONCOLOGY  IP CONSULT TO RADIATION ONCOLOGY  IP CONSULT TO PALLIATIVE CARE    Network Utilization Review Department  ATTENTION: Please call with any questions or concerns to [phone] and carefully listen to the prompts so that you are directed to the right person  All voicemails are confidential   Ashley Rivas all requests for admission clinical reviews, approved or denied determinations and any other requests to dedicated fax number below belonging to the campus where the patient is receiving treatment   List of dedicated fax numbers for the Facilities:  [address] DENIALS (Administrative/Medical Necessity) [phone]   [address]  [facility] (Maternity/NICU/Pediatrics) [phone]   [address]  150 [facility] [place] [phone]     Erik Ramirez [address] [address] [phone] [facility] [phone]

## 2022-06-10 NOTE — ASSESSMENT & PLAN NOTE
Lab Results   Component Value Date    HGBA1C 7 2 (H) 02/10/2022       No results for input(s): POCGLU in the last 72 hours      Blood Sugar Average: Last 72 hrs:   continue Accu-Cheks and long-acting insulin and sliding scale

## 2022-06-10 NOTE — PLAN OF CARE
Problem: PHYSICAL THERAPY ADULT  Goal: Performs mobility at highest level of function for planned discharge setting  See evaluation for individualized goals  Description: Treatment/Interventions: Functional transfer training, LE strengthening/ROM, Elevations, Therapeutic exercise, Endurance training, Cognitive reorientation, Patient/family training, Equipment eval/education, Bed mobility, Gait training, Compensatory technique education, Spoke to nursing, Spoke to case management, Family, ADL retraining  Equipment Recommended:  (none at this time - will trial RW)       See flowsheet documentation for full assessment, interventions and recommendations  Note: Prognosis: Good  Problem List: Decreased strength, Decreased endurance, Impaired balance, Decreased mobility  Pt is 74 y o  male seen for PT evaluation s/p admit to [facility] on 6/9/2022  Pt presenting w/ weakness fevers and decreased po intake     Current dx/ problem list includes: infection of renal pelvis  CT of abdomen reported "Right percutaneous nephrostomy tube appears to terminate within the right renal pelvis with right hydronephrosis and a small focus of gas within the upper pole collecting system worrisome for infection unless there has been recent instrumentation  Comorbidities affecting pt's physical performance at time of assessment listed above and significant for:  has a past medical history of Alcoholism ([facility] ), Cerebral artery aneurysm, Change in mental state, Diabetes mellitus ([facility] ), Drug dependence ([facility] ), Fatigue, Hepatitis C, Hospital discharge follow-up (12/21/2018), Kidney disease, 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[facility] ), Liver transplant recipient [facility]), Renal disorder, Subdural hygroma, and Thrombocytopenia ([facility] ) (9/20/2017)    Personal factors affecting pt at time of IE include: steps to enter environment,  advanced age, past experience, difficulty performing IADLs/  ADLs,  limited insight into impairments    Due to acute medical issues, ongoing medical workup for primary dx; weakness fall risk, decreased activity tolerance compared to baseline, LOB; progressive weakness/ declining function at home;  increased assistance needed from caregiver at current time, continuous telemetry monitoring, multiple lines, decline in overall functional mobility status; health management issues; note unstable clinical picture (high complexity)   Prior to admission, pt was living w/ SO and daughters in a [facility] w/ 5 DAWN and FF to main bed and bath on 2nd w/ HR  Pt was not using AD PTA but reports increasing weakness recently  Currently pt  is requiring S A for bed skills; S for functional transfers and CGA for ambulation w/ HHA limited distances 2* fatigue and LE instability w/ 1 mild LOB w/ CGA for correction  Pt does reports to PT that ~1 month ago he was very active and out cutting grass/ gardening  Pt reports 1* goal of regaining  indep and improving balance   Pt presents functioning below baseline and currently w/ overall mobility deficits 2* to: decreased LE strength/AROM; limited flexibility;  generalized weakness/ deconditioning; decreased endurance; decreased activity tolerance;  impaired balance; gait deviations;  fatigue; impaired safety and judgement; limited insight into current deficits;  multiple lines; Pt currently at risk for falls  (Please find additional objective findings from PT assessment regarding body systems outlined above ) Pt will continue to benefit from skilled PT interventions to address stated impairments; to maximize functional potential; for ongoing pt/ family training; and DME needs  Anticipate that with continued progress pt to d/c home with family support and  PT vs OPPT pending progress when medically cleared  Pt/ family agreeable to plan and goals as stated on evaluation        Barriers to Discharge Comments: steps     PT Discharge Recommendation: Home with home health rehabilitation (vs OPPT pending progress) See flowsheet documentation for full assessment

## 2022-06-10 NOTE — ASSESSMENT & PLAN NOTE
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

## 2022-06-10 NOTE — PHYSICAL THERAPY NOTE
PHYSICAL THERAPY EVALUATION  NAME:  Josue Clifford  DATE: 06/10/22    AGE:   74 y o  Mrn:   [id number]  ADMIT DX:  Infection of renal pelvis and ureter [N28 85]  Fatigue [R53 83]  Fever [R50 9]    Past Medical History:   Diagnosis Date    Alcoholism ([place] [address] )     Cerebral artery aneurysm     Change in mental state     last assessed 5/18/15; resolved 10/27/15    Diabetes mellitus ([place] [address] )     Drug dependence ([place] [address] )     Fatigue     last assessed 1/26/15; resolved 5/24/16    Hepatitis C     Hospital discharge follow-up 12/21/2018    Kidney disease     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[place] [address] )     Liver transplant recipient [facility])     Renal disorder     Subdural hygroma     2/27/14; resolved 7/28/15    Thrombocytopenia ([place] [address] ) 9/20/2017     Past Surgical History:   Procedure Laterality Date    BRAIN SURGERY  02/12/2014    left frontotemporal 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    CATARACT EXTRACTION      CHOLECYSTECTOMY      FL LUMBAR PUNCTURE DIAGNOSTIC  12/14/2018    IR NEPHROSTOMY TUBE PLACEMENT  5/28/2022    IR PICC LINE  12/14/2018    LIVER TRANSPLANTATION      ROTATOR CUFF REPAIR      SHOULDER SURGERY      TRANSURETHRAL RESECTION OF BLADDER TUMOR N/A 5/26/2022    Procedure: TRANSURETHRAL RESECTION OF BLADDER TUMOR (TURBT); Surgeon: Gustavo Neff MD;  Location:  MAIN OR;  Service: Urology       Length Of Stay: 1  PHYSICAL THERAPY EVALUATION :    06/10/22 1231   PT Last Visit   PT Visit Date 06/10/22   Note Type   Note type Evaluation   Pain Assessment   Pain Assessment Tool 0-10   Pain Score No Pain   Restrictions/Precautions   Weight Bearing Precautions Per Order No   Other Precautions Multiple lines; Fall Risk;Pain  (drain)   Home Living   Type of [address] [place] [address] Two level;Stairs to enter with rails;Bed/bath upstairs   Bathroom Accessibility Accessible   Additional Comments lives w/ SO and daughters   Prior Function   Level of [place] Independent with ADLs and functional mobility   Lives With Significant other   Receives Help From Family   ADL Assistance Independent   IADLs Independent   Falls in the last 6 months 1 to 4  (1)   Comments indep w/o AD PTA; indep w/ ADL's IADLs  1 recent fall reported in yard  no injury  SO is very supportive and home to A 24/7   General   Family/Caregiver Present Yes   Cognition   Overall Cognitive Status WFL   Arousal/Participation Alert   Orientation Level Oriented X4   Memory Within functional limits   Following Commands Follows all commands and directions without difficulty   RUE Assessment   RUE Assessment WFL   LUE Assessment   LUE Assessment WFL   RLE Assessment   RLE Assessment WFL   LLE Assessment   LLE Assessment WFL   Light Touch   RLE Light Touch Grossly intact   LLE Light Touch Grossly intact   Bed Mobility   Supine to Sit 5  Supervision   Additional items Increased time required   Additional Comments OOB in eclienr for lunch post eval tx session   Transfers   Sit to Stand 5  Supervision   Stand to Sit 5  Supervision   Toilet transfer 4  Minimal assistance   Ambulation/Elevation   Gait pattern Excessively slow;Decreased foot clearance   Gait Assistance 4  Minimal assist  (CGA for LOBx2)   Assistive Device None   Distance 80+80 w/ stand  rest break for fatigue- SPO2 on RA > 96%; LOB Cash for correction x2   Stair Management Assistance Not tested   Balance   Static Sitting Good   Dynamic Sitting Good   Static Standing Good   Dynamic Standing Fair +   Ambulatory Fair -   Endurance Deficit   Endurance Deficit Yes   Endurance Deficit Description LE weakness and fatigue w/ LOB w/ fatgiue   Activity Tolerance   Activity Tolerance Patient limited by fatigue   Medical Staff Made Aware care coordination w/ CMMD and team   Nurse Made Aware yes- cleared for session   Assessment   Prognosis Good   Problem List Decreased strength;Decreased endurance; Impaired balance;Decreased mobility   Assessment Pt is 74 y o  male seen for PT evaluation s/p [facility] to [facility] on 6/9/2022  Pt presenting w/ weakness fevers and decreased po intake     Current dx/ problem list includes: infection of renal pelvis  CT of abdomen reported "Right percutaneous nephrostomy tube appears to terminate within the right renal pelvis with right hydronephrosis and a small focus of gas within the upper pole collecting system worrisome for infection unless there has been recent instrumentation  Comorbidities affecting pt's physical performance at time of assessment listed above and significant for:  has a past medical history of Alcoholism ([facility] ), Cerebral artery aneurysm, Change in mental state, Diabetes mellitus ([facility] ), Drug dependence ([facility] ), Fatigue, Hepatitis C, Hospital discharge follow-up (12/21/2018), Kidney disease, 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[facility] ), Liver transplant recipient [facility]), Renal disorder, Subdural hygroma, and Thrombocytopenia ([facility] ) (9/20/2017)    Personal factors affecting pt at time of IE include: steps to enter environment,  advanced age, past experience, difficulty performing IADLs/  ADLs,  limited insight into impairments  Due to acute medical issues, ongoing medical workup for primary dx; weakness fall risk, decreased activity tolerance compared to baseline, LOB; progressive weakness/ declining function at home;  increased assistance needed from caregiver at current time, continuous telemetry monitoring, multiple lines, decline in overall functional mobility status; health management issues; note unstable clinical picture (high complexity)   Prior to admission, pt was living w/ SO and daughters in a [facility] w/ 5 DAWN and FF to main bed and bath on 2nd w/ HR  Pt was not using AD PTA but reports increasing weakness recently  Currently pt  is requiring S A for bed skills; S for functional transfers and CGA for ambulation w/ HHA limited distances 2* fatigue and LE instability w/ 1 mild LOB w/ CGA for correction   Pt does reports to PT that ~1 month ago he was very active and out cutting grass/ gardening  Pt reports 1* goal of regaining  indep and improving balance   Pt presents functioning below baseline and currently w/ overall mobility deficits 2* to: decreased LE strength/AROM; limited flexibility;  generalized weakness/ deconditioning; decreased endurance; decreased activity tolerance;  impaired balance; gait deviations;  fatigue; impaired safety and judgement; limited insight into current deficits;  multiple lines; Pt currently at risk for falls  (Please find additional objective findings from PT assessment regarding body systems outlined above ) Pt will continue to benefit from skilled PT interventions to address stated impairments; to maximize functional potential; for ongoing pt/ family training; and DME needs  Anticipate that with continued progress pt to d/c home with family support and HH PT vs OPPT pending progress when medically cleared  Pt/ family agreeable to plan and goals as stated on evaluation  Barriers to Discharge Comments steps   Goals   Patient Goals regaining independence and endurance; improve balance for yardwork   STG Expiration Date 06/24/22   Short Term Goal #1 In 14 days pt will complete: 1) Bed mobility skills with MI to facilitate safe return to previous living environment and decrease burden on caregivers  2) Functional transfers with indep  to facilitate safe return to previous living environment  3) Ambulation with least restrictive AD indep' without LOB and stable vitals for safe ambulation in home/ community environment  4) Stair training up/ down flight step/s with 1 rail/s  and indep/ MI for safe access to previous living environment and to increase community access  5) Improve balance by 1 grade in order to decrease fall risk  6) Improve LE strength grades by 1 to increase independence w/ all functional mobility, transfers and gait   7) PT for ongoing pt and family education; DME needs and D/C planning to promote highest level of function in least restrictive environment  PT Treatment Day 0   Plan   Treatment/Interventions Functional transfer training;LE strengthening/ROM; Elevations; Therapeutic exercise; Endurance training;Cognitive reorientation;Patient/family training;Equipment eval/education; Bed mobility;Gait training; Compensatory technique education;Spoke to nursing;Spoke to case management; Family; ADL retraining   PT Frequency 3-5x/wk   Recommendation   PT Discharge Recommendation Home with home health rehabilitation  (vs OPPT pending progress)   Equipment Recommended   (none at this time - will trial RW)   AM-PAC Basic Mobility Inpatient   Turning in Bed Without Bedrails 4   Lying on Back to Sitting on Edge of Flat Bed 4   Moving Bed to Chair 4   Standing Up From Chair 4   Walk in Room 3   Climb 3-5 Stairs 2   Basic Mobility Inpatient Raw Score 21   Basic Mobility Standardized Score 45 55   Highest Level Of Mobility   JH-HLM Goal 6: Walk 10 steps or more   JH-HLM Achieved 7: Walk 25 feet or more   Additional Treatment Session   Start Time 1220   End Time 1231   Treatment Assessment Pt seen for skilled PT session following evaluation consisting of instruction in seated therex/ HEP instruction; for increased LE strengthening to assist w/ increasing independence w/ transfers and gait  Pt tolerates therex w/o complaints or increase in pain or fatigue  Pt happy to have something to do that "will make me stronger"  Will continue to  benefit from repeated instruction for correct technique and compliance  Exercises   Hip Flexion Sitting;20 reps   Hip Abduction Sitting;20 reps  (semi reclined in chair)   Hip Adduction Sitting;20 reps   Knee AROM Long Arc Quad 20 reps; Sitting   Ankle Pumps Sitting;25 reps   End of Consult   Patient Position at End of Consult Bedside chair; All needs within reach  (set up for lunch w/ family/ SO and daughter preseent)     The patient's AM-PAC Basic Mobility Inpatient Short Form Raw Score is 21  A Raw score of greater than 16 suggests the patient may benefit from discharge to home  Please also refer to the recommendation of the Physical Therapist for safe discharge planning      Josep Pugh, PT

## 2022-06-10 NOTE — CONSULTS
Inpatient consult to Urology  Consult performed by: Sharona Ho PA-C  Consult ordered by: Juvencio Hughes MD      Consult : UROLOGY  Urvashi Renae 74 y o  male [id number]   Unit/Bed #: [facility] 811-01  Encounter: [id number]        Assessment  & Plan  :    Muscle invasive bladder cancer:  -patient is status post TURBT the right trigone tumor now status post PCN on the right side for renal decompression  -patient due for follow-up appointment regarding pathology today with Sharon Soni   unfortunately patient presented to the hospital   -pathology coming back as muscle invasive bladder cancer  From urologic standpoint  Patient is not a candidate for cystectomy as patient has additional comorbidities along with prior liver transplant  Please see  attending attestation for additional information/ discussion  -consult to Medical Oncology, for further discussion regarding treatment options  -consult Radiation Oncology, for further discussion regarding treatment options  -consult palliative care, for goals of care discussions  Hydronephrosis:  -patient presenting to the emergency room due to 0 output from PCN for the past 3 days along with fever and flank pain  -CT scan on admission reveals mild right-sided hydronephrosis, PCN in proper position  -patient accidentally had the stopcock of the nephrostomy tube close  This was opened and to began to drain  Patient reporting pain improved  -creatinine trending downward/mildly improved  -able to flush nephrostomy tube without difficulty,  -100 cc and mildly blood tinged  May increase flushing daily to assure patency and clear hematuria  -patient now afebrile  -continue with medical optimization per primary team with IV fluids and antibiotics  -recommend maintaining PCN to drainage   -no need for IR tube check at this time as nephrostomy is able to be easily flushed  And continues to drain        Subjective :    Urvashi Renae  is a very pleasant 74 y o  male known to our practice for bladder mass now status post TURBT with pathology coming back as muscle invasive bladder cancer  Now status post PCN placement on the right due to inability to place ureteral stent on the right due to bladder mass on the right trigone  Patient presenting to the emergency room after fever of 101 at home, flank pain and no output out of nephrostomy tube in 3 days  CT scan on admission reveals mild right-sided hydronephrosis, PCN in proper position, mild increase in creatinine  Patient afebrile on admission  Per ER staff they noted that the stopcock for the nephrostomy tube was closed  They opened this and began to drain, patient's pain improved  He currently reports that he is doing well  Denies any nausea, vomiting, fevers, chills, flank pain or abdominal pain  Reports that his pain is significantly improved since admission  He reports when  came in he was having flank pain at site of nephrostomy tube along with burning  He denies any additional complaints at this time      Allergies   Allergen Reactions    Tequin [Gatifloxacin] Rash    Ciprofloxacin     Iv Contrast [Iodinated Diagnostic Agents]     Levofloxacin Rash    Lipitor [Atorvastatin] Rash     Rash and itching    Omnipaque [Iohexol]       Current Outpatient Medications   Medication Instructions    acetaminophen (TYLENOL) 500 mg, Oral, Every 6 hours PRN    clopidogrel (PLAVIX) 75 mg, Oral, Daily    Comfort EZ Pen Needles 32G X 4 MM MISC USE 3-4 AS DIRECTED    Continuous Blood Gluc  (FreeStyle Frank 14 Day [place]) NATALIA 1 Device, Does not apply, Continuous    Continuous Blood Gluc Sensor (FreeStyle Frank 14 Day Sensor) MISC 1 Device, Does not apply, 4 times daily    Incontinence Supply Disposable (Incontinence Brief Medium) MISC Does not apply, 4 times daily    INSULIN SYRINGE 1CC/29G 29G X 1/2" 1 ML MISC Does not apply    Lancet Devices (Lancing Device) MISC USE AS DIRECTED    Lancets MISC Does not apply    Levemir FlexTouch 18 Units, Subcutaneous, Daily at bedtime    OneTouch Ultra test strip TEST UP TO 3 TIMES A DAY    pregabalin (LYRICA) 50 mg, Oral, 3 times daily    rosuvastatin (CRESTOR) 40 MG tablet TAKE ONE (1) TABLET BY MOUTH DAILY    sodium chloride, PF, 0 9 % 10 mL, Intracatheter, Daily, Intracatheter flushing daily  May substitute prefilled syringe with normal saline 10 mL vials, 10 mL syringes, and 18 g blunt needles    tacrolimus (PROGRAF) 1 mg, Oral, Every 12 hours    tamsulosin (FLOMAX) 0 4 mg TAKE 1 CAPSULE(0 4 MG) BY MOUTH DAILY WITH DINNER    temazepam (RESTORIL) 30 mg, Oral, Daily at bedtime    triamcinolone (KENALOG) 0 1 % cream APPLY TO AFFECTED AREA TWICE DAILY    zolpidem (AMBIEN) 10 mg, Oral, Daily at bedtime      Past Medical History:   Diagnosis Date    [facility]ism ([facility] )     Cerebral artery aneurysm     Change in mental state     last assessed 5/18/15; resolved 10/27/15    Diabetes mellitus ([facility] )     Drug dependence ([facility] )     Fatigue     last assessed 1/26/15; resolved 5/24/16    Hepatitis [address] [place] [address] discharge follow-up 12/21/2018    Kidney disease     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[facility] )     [facility] transplant recipient [facility])     Renal disorder     Subdural hygroma     2/27/14; resolved 7/28/15    Thrombocytopenia ([facility] ) 9/20/2017     Past Surgical History:   Procedure Laterality Date    BRAIN SURGERY  02/12/2014    left frontotemporal 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    CATARACT EXTRACTION      CHOLECYSTECTOMY      FL LUMBAR PUNCTURE DIAGNOSTIC  12/14/2018    IR NEPHROSTOMY TUBE PLACEMENT  5/28/2022    IR PICC LINE  12/14/2018    LIVER TRANSPLANTATION      ROTATOR CUFF REPAIR      SHOULDER SURGERY      TRANSURETHRAL RESECTION OF BLADDER TUMOR N/A 5/26/2022    Procedure: TRANSURETHRAL RESECTION OF BLADDER TUMOR (TURBT);   Surgeon: Belkis Mcclendon MD;  Location: [facility];  Service: Urology     Family History   Problem Relation Age of Onset    Hypertension Mother         benign essential     Lung cancer Father     Diabetes Sister     Cancer Brother     Stroke Other         cva - due to embolism of cerebra artery      Social History     Socioeconomic History    Marital status: /Civil Union     Spouse name: None    Number of children: None    Years of education: less then high school    Highest education level: None   Occupational History    Occupation: physical disability    Tobacco Use    Smoking status: Never Smoker    Smokeless tobacco: Never Used    Tobacco comment: former smoker per allscripts    Vaping Use    Vaping Use: Never used   Substance and Sexual Activity    Alcohol use: Not Currently    Drug use: No     Comment: remotely quit drug use per allscripts     Sexual activity: Never   Other Topics Concern    None   Social History Narrative    Caffeine use     Living with significant other , girlfriend and daughter      Social Determinants of Health     Financial Resource Strain: Not on file   Food Insecurity: No Food Insecurity    Worried About Running Out of Food in the Last Year: Never true    [address] in the Last Year: Never true   Transportation Needs: Unknown    Lack of Transportation (Medical): Not on file    Lack of Transportation (Non-Medical): No   Physical Activity: Not on file   Stress: Not on file   Social Connections: Not on file   Intimate Partner Violence: Not on file   Housing Stability: Low Risk     Unable to Pay for Housing in the Last Year: No    Number of Places Lived in the Last Year: 1    Unstable Housing in the Last Year: No        Review of Systems   Constitutional: Negative  Negative for chills and fever  HENT: Negative  Eyes: Negative  Respiratory: Negative  Cardiovascular: Negative  Gastrointestinal: Negative  Negative for abdominal pain, diarrhea, nausea and vomiting  Endocrine: Negative      Genitourinary: Positive for flank pain  Little output out of nephrostomy tube   Skin: Negative  Allergic/Immunologic: Negative  Neurological: Negative  Hematological: Negative  Psychiatric/Behavioral: Negative  Objective     Physical Exam  Constitutional:       General: He is not in acute distress  Appearance: He is normal weight  He is not ill-appearing, toxic-appearing or diaphoretic  HENT:      Head: Normocephalic and atraumatic  Right Ear: External ear normal       Left Ear: External ear normal       Nose: Nose normal       Mouth/Throat:      Pharynx: Oropharynx is clear  Eyes:      General: No scleral icterus  Conjunctiva/sclera: Conjunctivae normal    Cardiovascular:      Rate and Rhythm: Normal rate and regular rhythm  Pulses: Normal pulses  Heart sounds: No murmur heard  No friction rub  No gallop  Pulmonary:      Effort: Pulmonary effort is normal  No respiratory distress  Breath sounds: No wheezing, rhonchi or rales  Abdominal:      General: Bowel sounds are normal  There is no distension  Tenderness: There is no abdominal tenderness  Comments: Nephrostomy tube in place draining blood-tinged urine  Nephrostomy tube able to be flushed with 10 cc of normal saline without difficulty   Genitourinary:     Comments: Urinal with clear yellow urine  Musculoskeletal:         General: Normal range of motion  Cervical back: Normal range of motion  Skin:     General: Skin is warm and dry  Neurological:      General: No focal deficit present  Mental Status: He is alert and oriented to person, place, and time  Psychiatric:         Mood and Affect: Mood normal          Behavior: Behavior normal          Thought Content:  Thought content normal          Judgment: Judgment normal           Imaging:  CT ABDOMEN AND PELVIS WITHOUT IV CONTRAST     INDICATION:   Nephrostomy catheter displacement  Fever, no perc neprhostomy output x 3days      COMPARISON: 5/23/2022      TECHNIQUE:  CT examination of the abdomen and pelvis was performed without intravenous contrast  This examination was performed without intravenous contrast in the context of the critical nationwide Omnipaque shortage  Axial, sagittal, and coronal 2D   reformatted images were created from the source data and submitted for interpretation       Radiation dose length product (DLP) for this visit:  401 mGy-cm   This examination, like all CT scans performed in the [place], was performed utilizing techniques to minimize radiation dose exposure, including the use of iterative   reconstruction and automated exposure control       Enteric contrast was administered       FINDINGS:     ABDOMEN     LOWER CHEST:  No clinically significant abnormality identified in the visualized lower chest      LIVER/BILIARY TREE:  Unremarkable      GALLBLADDER:  No calcified gallstones  No pericholecystic inflammatory change      SPLEEN:  Unremarkable      PANCREAS:  Unremarkable      ADRENAL GLANDS:  Unremarkable      KIDNEYS/URETERS:  Right percutaneous nephrostomy tube noted and appears to terminate within the renal pelvis  There is mild right hydronephrosis with a small focus of gas within the upper pole collecting system  Infection is of concern      The left kidney is unremarkable      STOMACH AND BOWEL:  Unremarkable      APPENDIX:  A normal appendix was visualized      ABDOMINOPELVIC CAVITY:  No ascites  No pneumoperitoneum    No lymphadenopathy      VESSELS:  Unremarkable for patient's age      PELVIS     REPRODUCTIVE ORGANS:  Prostatic calcifications noted      URINARY BLADDER:  Asymmetric thickening right posterior aspect of the bladder likely reflects some residual tumor      ABDOMINAL WALL/INGUINAL REGIONS:  Unremarkable      OSSEOUS STRUCTURES:  No acute fracture or destructive osseous lesion      IMPRESSION:  Right percutaneous nephrostomy tube appears to terminate within the right renal pelvis with right hydronephrosis and a small focus of gas within the upper pole collecting system worrisome for infection unless there has been recent instrumentation  Correlate with urinalysis      Asymmetric right posterior bladder wall thickening may reflect treatment related changes after neoplasm resection  Difficult to entirely exclude some residual or recurrent tumor in this region in this unenhanced study      Labs:  Lab Results   Component Value Date    SODIUM 138 06/10/2022    K 4 5 06/10/2022     06/10/2022    CO2 22 06/10/2022    BUN 34 (H) 06/10/2022    CREATININE 2 60 (H) 06/10/2022    GLUC 96 06/10/2022    CALCIUM 8 4 06/10/2022         Lab Results   Component Value Date    WBC 10 34 (H) 06/10/2022    HGB 8 7 (L) 06/10/2022    HCT 28 8 (L) 06/10/2022    MCV 80 (L) 06/10/2022     (L) 06/10/2022         VTE Pharmacologic Prophylaxis: Heparin  VTE Mechanical Prophylaxis: sequential compression device     Fina Nath PA-C

## 2022-06-10 NOTE — TELEPHONE ENCOUNTER
Patient's daughter calling to cancel patient's appointment with Dr Eladio Sharma due to her father being in the hospital     Appointment was canceled already

## 2022-06-10 NOTE — ASSESSMENT & PLAN NOTE
CT of abdomen reported "Right percutaneous nephrostomy tube appears to terminate within the right renal pelvis with right hydronephrosis and a small focus of gas within the upper pole collecting system worrisome for infection unless there has been recent instrumentation  Asymmetric right posterior bladder wall thickening may reflect treatment related changes after neoplasm resection    Difficult to entirely exclude some residual or recurrent tumor in this region in this unenhanced study "   consult  Continue antibiotic

## 2022-06-10 NOTE — ASSESSMENT & PLAN NOTE
Lab Results   Component Value Date    EGFR 22 06/09/2022    EGFR 23 05/30/2022    EGFR 22 05/29/2022    CREATININE 2 65 (H) 06/09/2022    CREATININE 2 54 (H) 05/30/2022    CREATININE 2 70 (H) 05/29/2022   Continue to monitor renal function, avoid nephrotoxic drugs and hypotension

## 2022-06-10 NOTE — PROGRESS NOTES
OPCM SW doing follow up for referral to Aging for LOC assessment  Patient is currently admitted to hospital   CM attempted to reach wife, Rebecca Melendez, to provide support and determine if Aging as been to home to do LOC assessment  Voicemail left, awaiting call back    OPCM SW will follow up when patient is discharged from hospital

## 2022-06-10 NOTE — PLAN OF CARE
Problem: MOBILITY - ADULT  Goal: Maintain or return to baseline ADL function  Description: INTERVENTIONS:  -  Assess patient's ability to carry out ADLs; assess patient's baseline for ADL function and identify physical deficits which impact ability to perform ADLs (bathing, care of mouth/teeth, toileting, grooming, dressing, etc )  - Assess/evaluate cause of self-care deficits   - Assess range of motion  - Assess patient's mobility; develop plan if impaired  - Assess patient's need for assistive devices and provide as appropriate  - Encourage maximum independence but intervene and supervise when necessary  - Involve family in performance of ADLs  - Assess for home care needs following discharge   - Consider OT consult to assist with ADL evaluation and planning for discharge  - Provide patient education as appropriate  Outcome: Progressing  Goal: Maintains/Returns to pre admission functional level  Description: INTERVENTIONS:  - Perform BMAT or MOVE assessment daily    - Set and communicate daily mobility goal to care team and patient/family/caregiver     - Collaborate with rehabilitation services on mobility goals if consulted  - Out of bed for toileting  - Record patient progress and toleration of activity level   Outcome: Progressing     Problem: GENITOURINARY - ADULT  Goal: Maintains or returns to baseline urinary function  Description: INTERVENTIONS:  - Assess urinary function  - Encourage oral fluids to ensure adequate hydration if ordered  - Administer IV fluids as ordered to ensure adequate hydration  - Administer ordered medications as needed  - Offer frequent toileting  - Follow urinary retention protocol if ordered  Outcome: Progressing     Problem: Potential for Falls  Goal: Patient will remain free of falls  Description: INTERVENTIONS:  - Educate patient/family on patient safety including physical limitations  - Instruct patient to call for assistance with activity   - Consult OT/PT to assist with strengthening/mobility   - Keep Call bell within reach  - Keep bed low and locked with side rails adjusted as appropriate  - Keep care items and personal belongings within reach  - Initiate and maintain comfort rounds  - Consider moving patient to room near nurses station  Outcome: Progressing

## 2022-06-10 NOTE — ASSESSMENT & PLAN NOTE
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

## 2022-06-10 NOTE — ASSESSMENT & PLAN NOTE
Likely, UA abnormal  CT on admission showed "Right percutaneous nephrostomy tube appears to terminate within the right renal pelvis with right hydronephrosis and a small focus of gas within the upper pole collecting system worrisome for infection unless there has been recent instrumentation"  · On IV ceftriaxone pending culture  · Appreciate urology input

## 2022-06-10 NOTE — TELEPHONE ENCOUNTER
I was in my Dad's My Chart looking at a Pathology report and now it's not there   My Dad is in the hospital could someone call me  [phone]

## 2022-06-10 NOTE — PLAN OF CARE
Problem: MOBILITY - ADULT  Goal: Maintain or return to baseline ADL function  Description: INTERVENTIONS:  -  Assess patient's ability to carry out ADLs; assess patient's baseline for ADL function and identify physical deficits which impact ability to perform ADLs (bathing, care of mouth/teeth, toileting, grooming, dressing, etc )  - Assess/evaluate cause of self-care deficits   - Assess range of motion  - Assess patient's mobility; develop plan if impaired  - Assess patient's need for assistive devices and provide as appropriate  - Encourage maximum independence but intervene and supervise when necessary  - Involve family in performance of ADLs  - Assess for home care needs following discharge   - Consider OT consult to assist with ADL evaluation and planning for discharge  - Provide patient education as appropriate  Outcome: Progressing  Goal: Maintains/Returns to pre admission functional level  Description: INTERVENTIONS:  - Perform BMAT or MOVE assessment daily    - Set and communicate daily mobility goal to care team and patient/family/caregiver  - Collaborate with rehabilitation services on mobility goals if consulted  - Perform Range of Motion 3 times a day  - Reposition patient every 2 hours    - Dangle patient 3 times a day  - Stand patient 3 times a day  - Ambulate patient 3 times a day  - Out of bed to chair 3 times a day   - Out of bed for meals 3 times a day  - Out of bed for toileting  - Record patient progress and toleration of activity level   Outcome: Progressing     Problem: GENITOURINARY - ADULT  Goal: Maintains or returns to baseline urinary function  Description: INTERVENTIONS:  - Assess urinary function  - Encourage oral fluids to ensure adequate hydration if ordered  - Administer IV fluids as ordered to ensure adequate hydration  - Administer ordered medications as needed  - Offer frequent toileting  - Follow urinary retention protocol if ordered  Outcome: Progressing

## 2022-06-10 NOTE — ASSESSMENT & PLAN NOTE
Lab Results   Component Value Date    HGBA1C 7 2 (H) 02/10/2022       Recent Labs     06/10/22  0112 06/10/22  0735   POCGLU 110 92       Blood Sugar Average: Last 72 hrs:  (P) 101   · A1C reasonable   Home regimen: Levemir 18 units HS with SSI yes

## 2022-06-10 NOTE — TELEPHONE ENCOUNTER
Dr Carmen Cotto spoke with Nicolás Tineo  Patient needs follow up about a month after hospital discharge

## 2022-06-10 NOTE — H&P
[address] [facility]  H&P- Aster Galvez 1950, 74 y o  male MRN: [id number]  Unit/Bed#: [facility] 811-01 Encounter: [id number]  Primary Care Provider: Irene Gill DO   Date and time admitted to hospital: 6/9/2022  8:06 PM    * Generalized weakness  Assessment & Plan  Most likely secondary to urinary tract infection  Will follow-up urinalysis and deescalate antibiotics pending final culture report   continue ceftriaxone  Continue IV fluid  Given abnormal CT of abdomen findings Urology will be consulted  IR consult    Leukocytosis  Assessment & Plan  Most likely secondary to recent intervention and urinary tract infection the wound was abnormal CT of abdomen  Management as above    Abnormal CT of the abdomen  Assessment & Plan  CT of abdomen reported "Right percutaneous nephrostomy tube appears to terminate within the right renal pelvis with right hydronephrosis and a small focus of gas within the upper pole collecting system worrisome for infection unless there has been recent instrumentation  Asymmetric right posterior bladder wall thickening may reflect treatment related changes after neoplasm resection  Difficult to entirely exclude some residual or recurrent tumor in this region in this unenhanced study "   consult  Continue antibiotic       Nephrostomy status [facility])  Assessment & Plan  According to  Urology notes "  Presented recently to the hospital with signs of gross hematuria  Patient was cleared for surgery and proceeded to the operating room at which time a large tumor along the right argenis trigone was resected  Unfortunately were unable to visualize the right ureteral orifice  Patient was observed after surgery and ultimately right nephrostomy tube was placed    Catheter was left in place after surgery  "  Urology to follow up    CKD (chronic kidney disease) stage 4, GFR 15-29 ml/min [facility])  Assessment & Plan  Lab Results   Component Value Date    EGFR 22 06/09/2022 EGFR 23 05/30/2022    EGFR 22 05/29/2022    CREATININE 2 65 (H) 06/09/2022    CREATININE 2 54 (H) 05/30/2022    CREATININE 2 70 (H) 05/29/2022   Continue to monitor renal function, avoid nephrotoxic drugs and hypotension    Hyperlipidemia  Assessment & Plan  Continue low-cholesterol diet    Liver transplant status ([facility] [address] )  Assessment & Plan  Continue Prograf    Controlled diabetes mellitus with diabetic neuropathy, with long-term current use of insulin (HCC)  Assessment & Plan  Lab Results   Component Value Date    HGBA1C 7 2 (H) 02/10/2022       No results for input(s): POCGLU in the last 72 hours  Blood Sugar Average: Last 72 hrs:   continue Accu-Cheks and long-acting insulin and sliding scale    VTE Pharmacologic Prophylaxis: VTE Score: 4 Moderate Risk (Score 3-4) - Pharmacological DVT Prophylaxis Ordered: heparin  Code Status: Level 1 - Full Code       Anticipated Length of Stay: Patient will be admitted on an inpatient basis with an anticipated length of stay of greater than 2 midnights secondary to UTI treatment urology and IR consult  Total Time for Visit, including Counseling / Coordination of Care: 45 minutes Greater than 50% of this total time spent on direct patient counseling and coordination of care  Chief Complaint:  Fever, generalized weakness, decreased oral intake    History of Present Illness:  Don Alcantar is a 74 y o  male with a PMH of liver transplant, CKD 4, insulin-dependent diabetes who presented from home for evaluation of above-mentioned symptoms over the past 3 days  Patient wife also reported to the ER no drainage from right-sided nephrostomy tube that was placed 2 weeks ago often patient underwent bladder tumor resection    He reports nausea but denies vomiting chest abdominal pain diarrhea  Temperature maximum in the emergency room 37 4C, heart rate 89   labs reveal creatinine of 2 65 WBC 14 4 urinalysis positive for mild pyuria  CT of abdomen and pelvis impression "Right percutaneous nephrostomy tube appears to terminate within the right renal pelvis with right hydronephrosis and a small focus of gas within the upper pole collecting system worrisome for infection unless there has been recent instrumentation  Asymmetric right posterior bladder wall thickening may reflect treatment related changes after neoplasm resection  Difficult to entirely exclude some residual or recurrent tumor in this region in this unenhanced study "  Patient was given dose of IV antibiotics   According to ER follows , urology on-call  recommended admission for IV antibiotic ,they will take care of IR consult in a m  Patient admitted to the hospital service on an  inpatient basis          Review of Systems:  Review of Systems   Constitutional: Positive for activity change and appetite change         Past Medical and Surgical History:   Past Medical History:   Diagnosis Date    Alcoholism ([facility] [address] )     Cerebral artery aneurysm     Change in mental state     last assessed 5/18/15; resolved 10/27/15    Diabetes mellitus ([facility] [address] )     Drug dependence ([facility] [address] )     Fatigue     last assessed 1/26/15; resolved 5/24/16    Hepatitis [facility] discharge follow-up 12/21/2018    Kidney disease     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[facility] [address] )     Liver transplant recipient [facility])     Renal disorder     Subdural hygroma     2/27/14; resolved 7/28/15    Thrombocytopenia ([facility] [address] ) 9/20/2017       Past Surgical History:   Procedure Laterality Date    BRAIN SURGERY  02/12/2014    left frontotemporal 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 Authorizing Provider   acetaminophen (TYLENOL) 500 mg tablet Take 1 tablet (500 mg total) by mouth every 6 (six) hours as needed for mild pain 5/23/21   Temo Yin MD   clopidogrel (PLAVIX) 75 mg tablet Take 1 tablet (75 mg total) by mouth daily 8/30/21   Kaylin Bernabe DO   Comfort EZ Pen Needles 32G X 4 MM MISC USE 3-4 AS DIRECTED 5/17/22   Kaylin Bernabe DO   Continuous Blood Gluc  (FreeStyle Frank 14 Day [place]) NATALIA Use 1 Device continuous 6/3/22   Kaylin Bernabe DO   Continuous Blood Gluc Sensor (FreeStyle Frank 14 Day Sensor) MISC Use 1 Device 4 (four) times a day 6/3/22   Kaylin Bernabe DO   Incontinence Supply Disposable (Incontinence Brief Medium) MISC Use 4 (four) times a day 6/2/22 7/2/22  Kaylin Bernabe DO   insulin detemir (Levemir FlexTouch) 100 Units/mL injection pen Inject 18 Units under the skin daily at bedtime 5/30/22   Darnell Everett MD   INSULIN SYRINGE 1CC/29G 29G X 1/2" 1 ML MISC by Does not apply route 1/11/12   Historical Provider, MD   Lancet Devices (Lancing Device) MISC USE AS DIRECTED 4/18/22   Kaylin Bernabe DO   Lancets MISC by Does not apply route 5/12/17   Historical Provider, MD   OneTouch Ultra test strip TEST UP TO 3 TIMES A DAY 1/5/22   Kaylin Bernabe DO   pregabalin (LYRICA) 50 mg capsule TAKE 1 CAPSULE (50 MG TOTAL) BY MOUTH 3 (THREE) TIMES A DAY 5/23/22   Kaylin Bernabe DO   rosuvastatin (CRESTOR) 40 MG tablet TAKE ONE (1) TABLET BY MOUTH DAILY 6/9/22   Kaylin Bernabe DO   sodium chloride, PF, 0 9 % 10 mL by Intracatheter route daily Intracatheter flushing daily   May substitute prefilled syringe with normal saline 10 mL vials, 10 mL syringes, and 18 g blunt needles 5/31/22 8/29/22  Dayana Sahu    tacrolimus (PROGRAF) 1 mg capsule Take 1 capsule (1 mg total) by mouth every 12 (twelve) hours 8/30/21   Dakota Lovell DO   tamsulosin (FLOMAX) 0 4 mg TAKE 1 CAPSULE(0 4 MG) BY MOUTH DAILY WITH DINNER 11/11/19   Jesusita Stanford MD   temazepam (RESTORIL) 30 mg capsule TAKE 1 CAPSULE (30 MG TOTAL) BY MOUTH DAILY AT BEDTIME 1/19/22   Dakota Lovell DO   triamcinolone (KENALOG) 0 1 % cream APPLY TO AFFECTED AREA TWICE DAILY  Patient not taking: No sig reported 5/19/22   Dakota Lovell DO   zolpidem (AMBIEN) 10 mg tablet Take 1 tablet (10 mg total) by mouth daily at bedtime 3/1/22   Dakota Lovell DO     I have reviewed home medications with a medical source (PCP, Pharmacy, other)  Allergies:    Allergies   Allergen Reactions    Tequin [Gatifloxacin] Rash    Ciprofloxacin     Iv Contrast [Iodinated Diagnostic Agents]     Levofloxacin Rash    Lipitor [Atorvastatin] Rash     Rash and itching    Omnipaque [Iohexol]        Social History:  Marital Status: /Civil Union   Occupation: none  Patient Pre-hospital Living Situation: Home  Patient Pre-hospital Level of Mobility: walks  Patient Pre-hospital Diet Restrictions: reg  Substance Use History:   Social History     Substance and Sexual Activity   Alcohol Use Not Currently     Social History     Tobacco Use   Smoking Status Never Smoker   Smokeless Tobacco Never Used   Tobacco Comment    former smoker per [employer]      Social History     Substance and Sexual Activity   Drug Use No    Comment: remotely quit drug use per [employer]scripts        Family History:  Family History   Problem Relation Age of Onset    Hypertension Mother         benign essential     Lung cancer Father     Diabetes Sister     Cancer Brother     Stroke Other         cva - due to embolism of cerebra artery        Physical Exam:     Vitals:   Blood Pressure: 125/62 (06/10/22 0022)  Pulse: 72 (06/10/22 0000)  Temperature: 98 7 °F (37 1 °C) (06/10/22 0022)  Temp Source: Oral (06/09/22 2008)  Respirations: 16 (06/10/22 0000)  Height: 5' 3" (160 cm) (06/10/22 0101)  Weight - Scale: 58 1 kg (128 lb) (06/10/22 0101)  SpO2: 97 % (06/10/22 0138)    Physical Exam  Constitutional:       Appearance: He is not ill-appearing  HENT:      Head: Normocephalic  Mouth/Throat:      Mouth: Mucous membranes are dry  Cardiovascular:      Rate and Rhythm: Normal rate  Pulmonary:      Effort: Pulmonary effort is normal    Abdominal:      General: Abdomen is flat  Genitourinary:     Comments: Right nephrostomy tube in place with hemorrhagic content in the drainage bag  Musculoskeletal:         General: No swelling  Skin:     General: Skin is warm  Neurological:      General: No focal deficit present     Psychiatric:         Mood and Affect: Mood normal           Additional Data:     Lab Results:  Results from last 7 days   Lab Units 06/09/22  2222   WBC Thousand/uL 14 49*   HEMOGLOBIN g/dL 9 5*   HEMATOCRIT % 30 8*   PLATELETS Thousands/uL 164   NEUTROS PCT % 82*   LYMPHS PCT % 10*   MONOS PCT % 7   EOS PCT % 1     Results from last 7 days   Lab Units 06/09/22  2222   SODIUM mmol/L 134*   POTASSIUM mmol/L 5 2   CHLORIDE mmol/L 104   CO2 mmol/L 25   BUN mg/dL 34*   CREATININE mg/dL 2 65*   ANION GAP mmol/L 5   CALCIUM mg/dL 8 7   ALBUMIN g/dL 2 9*   TOTAL BILIRUBIN mg/dL 0 40   ALK PHOS U/L 89   ALT U/L 21   AST U/L 29   GLUCOSE RANDOM mg/dL 136     Results from last 7 days   Lab Units 06/09/22  2222   INR  1 18     Results from last 7 days   Lab Units 06/10/22  0112   POC GLUCOSE mg/dl 110         Results from last 7 days   Lab Units 06/09/22  2222   LACTIC ACID mmol/L 1 1   PROCALCITONIN ng/ml 0 28*       Imaging: Reviewed radiology reports from this admission including: abdominal/pelvic CT  CT abdomen pelvis wo contrast   Final Result by Talha Landis MD (06/09 2142)   Right percutaneous nephrostomy tube appears to terminate within the right renal pelvis with right hydronephrosis and a small focus of gas within the upper pole collecting system worrisome for infection unless there has been recent instrumentation  Correlate with urinalysis  Asymmetric right posterior bladder wall thickening may reflect treatment related changes after neoplasm resection  Difficult to entirely exclude some residual or recurrent tumor in this region in this unenhanced study  Workstation performed: [id number]             ** Please Note: This note has been constructed using a voice recognition system   **

## 2022-06-10 NOTE — ASSESSMENT & PLAN NOTE
Most likely secondary to urinary tract infection  Will follow-up urinalysis and deescalate antibiotics pending final culture report   continue ceftriaxone  Continue IV fluid  Given abnormal CT of abdomen findings Urology will be consulted  IR consult

## 2022-06-10 NOTE — TELEPHONE ENCOUNTER
Soft Intake Form   Patient Details   Radames Contreras     1950     Reason For Appointment   Who is Calling? Meryle Luna   If not patient, Name? NA    DID YOU CONFIRM INSURANCE WITH PATIENT? Routed to Finance   Who is the Referring Doctor? YAMILKA Vines   What is the diagnosis? Malignant Neoplasm of trigone of urinary bladder   Has this diagnosis been confirmed by a biopsy/surgery? If yes, what is the date it was done? Yes- biopsy taken 5/26   Biopsy done at [place] [id number]? If not, where was it done? Yes   Was imaging done, and was it done at [facility]? If not, where was it done? Yes   Have you been seen by another Oncologist?  If so, who and where (name of facility, city and state) No  The patient does follow at [facility] for transplant services   For 2nd Opinions Only: Are you currently undergoing treatment, or are you scheduled to start treatment? If yes, name of facility, city and state  NA   For "History Of" only: Have you completed treatment? NA   Have you had Genetic Testing done in the past?  If so, advise to bring test results to their visit Unknown   Record Gathering Information   Did you advise to have records faxed to [phone]? No   Did you advise to have disks sent to the proper address with imaging? ("History of" Patients)  5 years of imaging for breast patients-Mammos, US etc No   Scheduling Information   Did you send new patient paperwork? Email or mail? NA   What is the best call back number? (If the [facility] is calling, please use their number) NA   Miscellaneous Information      The patient has been scheduled for a HFU appointment with Dr Madhavi Curtis on 6/15 at 1120 in the [facility]

## 2022-06-10 NOTE — PROGRESS NOTES
Shannan [place] [address] 1950, 74 y o  male MRN: [id number]  Unit/Bed#: [facility] 811-01 Encounter: [id number]  Primary Care Provider: Rozina Parra DO   Date and time admitted to hospital: 6/9/2022  8:06 PM    [facility] Internal Medicine Post Admit Check:     Date of visit: 06/10/22    The patient was admitted earlier to the [facility] Internal Medicine Service  Please see initial intake history and physical for detailed admission history  This is a supplemental update following a post admit checkup  Subjective:   Doing okay  Feels better  Exam:   Gen: Awake, alert, oriented  No distress  On RA  CV: RRR  : R nephrostomy tube with red tinged urine  LUngs; CTAB     Updated daughter Judah Lomeli via phone  She requests urology to follow up with her after they see patient  TT'd Urology to make aware  Assessment and Plan:   * Generalized weakness  Assessment & Plan  Presents with generalized weakness along with chills and fevers reported at home  Suspect 2/2 UTI as below  · See treatment below  · PT/OT consult    Bladder mass  Assessment & Plan  Recent admission for gross hematuria, found to have bladder mass   05/26/2022 Cystoscopy with Urology; findings include 5 cm mass, unable to stent right-sided hydronephrosis with mass blocking right ureteral orifice  · Biopsy resulted--patient had appointment outpatient 6/10 to review with urology however is now hospitalized  · Appreciate urology input  · Also appreciate oncology input given findings of "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  · Radiation was consulted by admitting doc, f/u input   · Palliative consulted by admitting doc, f/u input    UTI (urinary tract infection)  Assessment & Plan  Likely, UA abnormal  CT on admission showed "Right percutaneous nephrostomy tube appears to terminate within the right renal pelvis with right hydronephrosis and a small focus of gas within the upper pole collecting system worrisome for infection unless there has been recent instrumentation"  · On IV ceftriaxone pending culture  · Appreciate urology input     Nephrostomy status [facility])  Assessment & Plan  Recent admission for gross hematuria, hydronephrosis of R kidney  · S/p nephrostomy tube placement in IR--may require IR consult, defer to urology    CKD (chronic kidney disease) stage 4, GFR 15-29 ml/min [facility])  Assessment & Plan  Lab Results   Component Value Date    EGFR 23 06/10/2022    EGFR 22 06/09/2022    EGFR 23 05/30/2022    CREATININE 2 60 (H) 06/10/2022    CREATININE 2 65 (H) 06/09/2022    CREATININE 2 54 (H) 05/30/2022   · [facility]  · Creatinine baseline approx 2 9  · Monitor BMP      Liver transplant status [facility])  Assessment & Plan  History of liver transplant in 2003, maintained on Tacrolimus managed by [facility] transplant  · Continue home dose of Prograf 1 mg q 12 hours    Controlled diabetes mellitus with diabetic neuropathy, with long-term current use of insulin ([facility])  Assessment & Plan  Lab Results   Component Value Date    HGBA1C 7 2 (H) 02/10/2022       Recent Labs     06/10/22  0112 06/10/22  0735   POCGLU 110 92       Blood Sugar Average: Last 72 hrs:  (P) 101   · A1C reasonable   Home regimen: Levemir 18 units HS with SSI    History of CVA (cerebrovascular accident)  Assessment & Plan  Noted hx  · On plavix        Shanelle Montana PA-C

## 2022-06-10 NOTE — CONSULTS
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  Hx and PE limited by:  No limitations    HPI: Alexa Aranda is a 74y o  year old male who presents with a recently diagnosed invasive high-grade papillary urothelial carcinoma involving the right posterior bladder/trigone region  He has a history of of a liver transplant approximately 21 years ago, stage 4 chronic kidney disease, and insulin-dependent diabetes  He had liver failure secondary to alcohol abuse and has been on immunosuppressants since his liver transplant 21 years ago performed in [place]  He presented in the ED with gross hematuria  He had CT scans of his chest, abdomen, and pelvis performed on 5/23/22 that revealed a large bladder mass with persistence of right sided hydronephrosis  There was no evidence of any pelvic lymphadenopathy as well as no metastatic disease to the upper abdomen or chest   He is s/p TURBT on 5/26/22 and s/p nephrostomy tube placement in IR on 5/28/2022 for right-sided hydronephrosis  CT of abdomen and pelvis 6/9/22 revealed asymmetric right posterior bladder wall thickening may reflect treatment related changes after neoplasm resection  Final pathology diagnosis from tissue exam (5/26/22) s/p transurethral resection (right posterior bladder tumor, right bladder tumor deep) revealed 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 (detrusor muscle)  He is seen consultation today to consider radiation therapy as a treatment option  He currently lives with his wife and daughter  He has been on disability for 21 years since he had his liver transplant    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  He has had no alcohol to drink for over 21 years  He has a previous history of skin carcinoma of the nose treated surgically twice  He denies any previous radiation therapy nor chemotherapy  Consults    Review of Systems   Fourteen point review of system is negative except for as noted history of the present illness  Historical Information   Previous Oncology History:  He denies any previous history of radiation therapy nor any chemotherapy  He does have a history of previous skin carcinoma involving the nose that was surgically removed twice  He denies any other history of malignancy  Past Medical History:   Diagnosis Date    Alcoholism ([facility] )     Cerebral artery aneurysm     Change in mental state     last assessed 5/18/15; resolved 10/27/15    [facility]     Drug dependence ([place] [id number] )     Fatigue     last assessed 1/26/15; resolved 5/24/16    Hepatitis [facility] discharge follow-up 12/21/2018    Kidney disease     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 )     Liver transplant recipient [facility])     Renal disorder     Subdural hygroma     2/27/14; resolved 7/28/15    Thrombocytopenia ([facility] ) 9/20/2017     Past Surgical History:   Procedure Laterality Date    BRAIN SURGERY  02/12/2014    left frontotemporal 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    CATARACT EXTRACTION      CHOLECYSTECTOMY      FL LUMBAR PUNCTURE DIAGNOSTIC  12/14/2018    IR NEPHROSTOMY TUBE PLACEMENT  5/28/2022    IR PICC LINE  12/14/2018    LIVER TRANSPLANTATION      ROTATOR CUFF REPAIR      SHOULDER SURGERY      TRANSURETHRAL RESECTION OF BLADDER TUMOR N/A 5/26/2022    Procedure: TRANSURETHRAL RESECTION OF BLADDER TUMOR (TURBT);   Surgeon: Zuleyka Murray MD;  Location: [facility];  Service: Urology     Family History   Problem Relation Age of Onset    Hypertension Mother         benign essential     Lung cancer Father     Diabetes Sister     Cancer Brother     Stroke Other         cva - due to embolism of cerebra artery      Social History   Social History     Substance and Sexual Activity   Alcohol Use Not Currently     Social History     Substance and Sexual Activity   Drug Use No    Comment: remotely quit drug use per [employer]      Social History     Tobacco Use   Smoking Status Never Smoker   Smokeless Tobacco Never Used   Tobacco Comment    former smoker per [employer]        Meds/Allergies   all current active meds have been reviewed    Allergies   Allergen Reactions    Tequin [Gatifloxacin] Rash    Ciprofloxacin     Iv Contrast [Iodinated Diagnostic Agents]     Levofloxacin Rash    Lipitor [Atorvastatin] Rash     Rash and itching    Omnipaque [Iohexol]        Objective     Intake/Output Summary (Last 24 hours) at 6/10/2022 1526  Last data filed at 6/10/2022 1159  Gross per 24 hour   Intake 1218 5 ml   Output 650 ml   Net 568 5 ml     Invasive Devices  Report    Peripheral Intravenous Line  Duration           Peripheral IV 06/09/22 Right;Upper;Ventral (anterior) Arm 1 day          Drain  Duration           Nephrostomy Right 8 5 Fr  13 days              Physical Exam  Vitals and nursing note reviewed  Exam conducted with a chaperone present  Constitutional:       General: He is not in acute distress  Appearance: Normal appearance  He is well-developed  He is not diaphoretic  HENT:      Head: Normocephalic and atraumatic  Mouth/Throat:      Pharynx: No oropharyngeal exudate  Eyes:      General: No scleral icterus  Conjunctiva/sclera: Conjunctivae normal       Pupils: Pupils are equal, round, and reactive to light  Neck:      Thyroid: No thyromegaly  Trachea: No tracheal deviation  Cardiovascular:      Rate and Rhythm: Normal rate and regular rhythm  Heart sounds: Normal heart sounds  Pulmonary:      Effort: Pulmonary effort is normal  No respiratory distress        Breath sounds: Normal breath sounds  No stridor  No wheezing, rhonchi or rales  Chest:      Chest wall: No tenderness  Breasts:      Right: No supraclavicular adenopathy  Left: No supraclavicular adenopathy  Abdominal:      General: Abdomen is flat  Bowel sounds are normal  There is no distension  Palpations: Abdomen is soft  There is no mass  Tenderness: There is no abdominal tenderness  There is no rebound  Hernia: No hernia is present  Musculoskeletal:         General: No swelling or tenderness  Normal range of motion  Cervical back: Normal range of motion and neck supple  Lymphadenopathy:      Cervical: No cervical adenopathy  Upper Body:      Right upper body: No supraclavicular adenopathy  Left upper body: No supraclavicular adenopathy  Lower Body: No right inguinal adenopathy  No left inguinal adenopathy  Skin:     General: Skin is warm and dry  Coloration: Skin is not jaundiced or pale  Findings: No erythema or rash  Neurological:      General: No focal deficit present  Mental Status: He is alert and oriented to person, place, and time  Cranial Nerves: No cranial nerve deficit  Sensory: No sensory deficit  Motor: No weakness  Coordination: Coordination normal    Psychiatric:         Mood and Affect: Mood normal          Behavior: Behavior normal          Thought Content: Thought content normal          Judgment: Judgment normal        Lab Results: I have personally reviewed pertinent reports  Imaging Studies: I have personally reviewed pertinent reports  and I have personally reviewed pertinent films in PACS  EKG, Pathology, and Other Studies: I have personally reviewed pertinent reports  Assessment/Plan     Assessment and Plan:  Wyatt Pool is a 74y o  year old male who presents with a recently diagnosed invasive high-grade papillary urothelial carcinoma involving the right posterior bladder/trigone region   He presented in the ED with gross hematuria  He had CT scans of his chest, abdomen, and pelvis performed on 5/23/22 that revealed a large bladder mass with persistence of right sided hydronephrosis  There was no evidence of any pelvic lymphadenopathy as well as no metastatic disease to the upper abdomen or chest   He is s/p TURBT on 5/26/22 and s/p nephrostomy tube placement in IR on 5/28/2022 for right-sided hydronephrosis  CT of abdomen and pelvis 6/9/22 revealed asymmetric right posterior bladder wall thickening may reflect treatment related changes after neoplasm resection  Final pathology from (5/26/22) s/p transurethral resection (right posterior bladder tumor, right bladder tumor deep) revealed 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 (detrusor muscle)  He is seen consultation today to consider radiation therapy as a treatment option  He has been seen by Urology and is not a candidate for neoadjuvant chemotherapy followed by radical cystectomy given his age, history of liver transplant, and medical problems  He is seen today for consultation for definitive radiation therapy  We discussed definitive radiation therapy with him over 7 weeks that will be given with concurrent chemotherapy  We discussed the acute side effects and the potential chronic complications of definitive radiation therapy with him and he consents to treatment  He was also seen today by medical oncology to discuss chemotherapy as a treatment option  He does wish to pursue any and all treatment options available to him  He does understand that he is not a good candidate for surgery and furthermore indicates he would not be interested in surgery  We will coordinate appointments as an outpatient for radiation therapy in addition to chemotherapy      Code Status: Level 1 - Full Code  Advance Directive and Living Will:      Power of :    POLST:      Counseling / Coordination of Care  Total floor / unit time spent today 50 minutes  Greater than 50% of total time was spent with the patient and / or family counseling and / or coordination of care   A description of the counseling / coordination of care: See Above

## 2022-06-10 NOTE — ASSESSMENT & PLAN NOTE
Most likely secondary to recent intervention and urinary tract infection the wound was abnormal CT of abdomen  Management as above

## 2022-06-10 NOTE — TELEPHONE ENCOUNTER
Called and spoke with [facility] regarding patients appt on Monday   [facility] would like to keep the appt as of now and if anything changes she will give a call on Monday

## 2022-06-10 NOTE — CONSULTS
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)  -CT A/P - large bladder mass with persistence of right sided hydronephrosis  -Large posterior bladder tumor noted on cystoscopy on 5/26/22, s/p TURBT on 5/26/22    -S/p nephrostomy tube placement in IR on 5/28/2022    PLAN:  1) Discussed with patient's significant other, Rosibel, about results of the final pathology revealing 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  Did not discuss findings patient, per Rosibel's request, as patient's daughter, who has a learning disability, was at the bedside with patient  Would like another discussion about cancer diagnosis when daughter is not on room  2) Will have Oncology Clinical Coordinator, Marcial Perez, set up an outpatient oncology for appointment to determine treatment options  3) Appreciate urology's recommendations     Outpatient follow up plan: TBD as outline above  Communication with team:  Communicated with primary team  Reviewed this patient with Dr Donato Khan and she is in agreement with this plan  Thank you for this consult  Radames MATA Long, [address] [place] [address] VIA [facility]  Hematology-Oncology      Reason for consultation: 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     History of present illness:  Susy Diallo is a 74 y o  male PMH of liver transplant, CKD 4, insulin-dependent diabetes  He presented in the ED for gross hematuria  Reported nausea but no vomiting, shortness of breath, diarrhea   abdominal pain diarrhea   CT A/P from 5/23/22 that's howed large bladder mass with persistence of right sided hydronephrosis; s/p TURBT on 5/26/22 and s/p nephrostomy tube placement in IR on 5/28/2022  CT of abdomen and pelvis revealed asymmetric right posterior bladder wall thickening may reflect treatment related changes after neoplasm resection  Final pathology diagnosis from tissue exam (5/26/22) s/p transurethral resection (right posterior bladder tumor, right bladder tumor deep) revealed 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 (detrusor muscle)  Hematology-Oncology was consulted to offered direction with the newly diagnosed bladder malignancy  Review of Systems:      Review of Systems   Constitutional: Positive for activity change and fatigue  Negative for chills and fever  HENT: Negative for ear pain and sore throat  Eyes: Negative for pain and visual disturbance  Respiratory: Negative for cough and shortness of breath  Cardiovascular: Negative for chest pain and palpitations  Gastrointestinal: Negative for abdominal pain and vomiting  Genitourinary: Positive for hematuria  Negative for dysuria  Musculoskeletal: Negative for arthralgias and back pain  Skin: Positive for pallor  Negative for color change and rash  Neurological: Positive for weakness  Negative for seizures and syncope  All other systems reviewed and are negative  Oncology History:   Cancer Staging  No matching staging information was found for the patient  Oncology History    No history exists         Past Medical History:   Past Medical History:   Diagnosis Date    [facility]     Cerebral artery aneurysm     Change in mental state     last assessed 5/18/15; resolved 10/27/15    Diabetes mellitus ([facility] )     Drug dependence ([facility] )     Fatigue     last assessed 1/26/15; resolved 5/24/16    Hepatitis [facility] discharge follow-up 12/21/2018    Kidney disease     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[facility] )     Liver transplant recipient [facility])     Renal disorder     Subdural hygroma     2/27/14; resolved 7/28/15    Thrombocytopenia ([facility] ) 9/20/2017       Past Surgical History:   Procedure Laterality Date    BRAIN SURGERY  02/12/2014    left frontotemporal 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    CATARACT EXTRACTION      CHOLECYSTECTOMY      FL LUMBAR PUNCTURE DIAGNOSTIC  12/14/2018    IR NEPHROSTOMY TUBE PLACEMENT  5/28/2022    IR PICC LINE  12/14/2018    LIVER TRANSPLANTATION      ROTATOR CUFF REPAIR      SHOULDER SURGERY      TRANSURETHRAL RESECTION OF BLADDER TUMOR N/A 5/26/2022    Procedure: TRANSURETHRAL RESECTION OF BLADDER TUMOR (TURBT);   Surgeon: Marquis Beyer MD;  Location: [facility];  Service: Urology       Family History   Problem Relation Age of Onset    Hypertension Mother         benign essential     Lung cancer Father     Diabetes Sister     Cancer Brother     Stroke Other         cva - due to embolism of cerebra artery        Social History     Socioeconomic History    Marital status: /Civil Union     Spouse name: None    Number of children: None    Years of education: less then high school    Highest education level: None   Occupational History    Occupation: physical disability    Tobacco Use    Smoking status: Never Smoker    Smokeless tobacco: Never Used    Tobacco comment: former smoker per allscripts    Vaping Use    Vaping Use: Never used   Substance and Sexual Activity    Alcohol use: Not Currently    Drug use: No     Comment: remotely quit drug use per allscripts     Sexual activity: Never   Other Topics Concern    None   Social History Narrative    Caffeine use     Living with significant other , girlfriend and daughter      Social Determinants of Health     Financial Resource Strain: Not on file   Food Insecurity: No Food Insecurity    Worried About [address] in the Last Year: Never true    [address] in the Last Year: Never true Transportation Needs: Unknown    Lack of Transportation (Medical): Not on file    Lack of Transportation (Non-Medical):  No   Physical Activity: Not on file   Stress: Not on file   Social Connections: Not on file   Intimate Partner Violence: Not on file   Housing Stability: Low Risk     Unable to Pay for Housing in the Last Year: No    Number of Places Lived in the Last Year: 1    Unstable Housing in the Last Year: No         Current Facility-Administered Medications:     acetaminophen (TYLENOL) tablet 650 mg, 650 mg, Oral, Q6H PRN, Taylor Tejeda MD, 650 mg at 06/10/22 0101    cefTRIAXone (ROCEPHIN) 1,000 mg in dextrose 5 % 50 mL IVPB, 1,000 mg, Intravenous, Q24H, Taylor Tejeda MD    clopidogrel (PLAVIX) tablet 75 mg, 75 mg, Oral, Daily, Taylor Tejeda MD, 75 mg at 06/10/22 0925    docusate sodium (COLACE) capsule 100 mg, 100 mg, Oral, BID, Taylor Tejeda MD, 100 mg at 06/10/22 0925    heparin (porcine) subcutaneous injection 5,000 Units, 5,000 Units, Subcutaneous, Q8H [address], 5,000 Units at 06/10/22 0611 **AND** Platelet count, , , Once, Taylor Tejeda MD    insulin detemir (LEVEMIR) subcutaneous injection 18 Units, 18 Units, Subcutaneous, HS, Taylor Tejeda MD    insulin lispro (HumaLOG) 100 units/mL subcutaneous injection 1-5 Units, 1-5 Units, Subcutaneous, TID AC **AND** Fingerstick Glucose (POCT), , , TID AC, Taylor Tejeda MD    insulin lispro (HumaLOG) 100 units/mL subcutaneous injection 1-5 Units, 1-5 Units, Subcutaneous, HS, Taylor Tejeda MD    magnesium sulfate 2 g/50 mL IVPB (premix) 2 g, 2 g, Intravenous, Once, Bishop Siemens, PA-C, 2 g at 06/10/22 0925    ondansetron (ZOFRAN) injection 4 mg, 4 mg, Intravenous, Q6H PRN, Taylor Tejeda MD    pregabalin (LYRICA) capsule 50 mg, 50 mg, Oral, TID, Taylor Tejeda MD, 50 mg at 06/10/22 0925    sodium chloride 0 9 % infusion, 90 mL/hr, Intravenous, Continuous, Taylor Tejeda MD, Last Rate: 90 mL/hr at 06/10/22 0100, 90 mL/hr at 06/10/22 0100    tacrolimus (PROGRAF) capsule 1 mg, 1 mg, Oral, Q12H, Jamie Blount MD    tamsulosin [facility]) capsule 0 4 mg, 0 4 mg, Oral, Daily With Devon Chi MD    temazepam (RESTORIL) capsule 30 mg, 30 mg, Oral, HS, Ceicly Zbaraschuk, CRNP, 30 mg at 06/10/22 0124    Medications Prior to Admission   Medication    acetaminophen (TYLENOL) 500 mg tablet    clopidogrel (PLAVIX) 75 mg tablet    Comfort EZ Pen Needles 32G X 4 MM MISC    Continuous Blood Gluc  (FreeStyle Frank 14 Day [place]) NATALIA    Continuous Blood Gluc Sensor (FreeStyle Frank 14 Day Sensor) MISC    Incontinence Supply Disposable (Incontinence Brief Medium) MISC    insulin detemir (Levemir FlexTouch) 100 Units/mL injection pen    INSULIN SYRINGE 1CC/29G 29G X 1/2" 1 ML MISC    Lancet Devices (Lancing Device) MISC    Lancets MISC    OneTouch Ultra test strip    pregabalin (LYRICA) 50 mg capsule    rosuvastatin (CRESTOR) 40 MG tablet    sodium chloride, PF, 0 9 %    tacrolimus (PROGRAF) 1 mg capsule    tamsulosin (FLOMAX) 0 4 mg    temazepam (RESTORIL) 30 mg capsule    triamcinolone (KENALOG) 0 1 % cream    zolpidem (AMBIEN) 10 mg tablet       Allergies   Allergen Reactions    Tequin [Gatifloxacin] Rash    Ciprofloxacin     Iv Contrast [Iodinated Diagnostic Agents]     Levofloxacin Rash    Lipitor [Atorvastatin] Rash     Rash and itching    Omnipaque [Iohexol]          Physical Exam:     /58   Pulse 61   Temp 98 6 °F (37 °C)   Resp 16   Ht 5' 3" (1 6 m)   Wt 58 1 kg (128 lb)   SpO2 98%   BMI 22 67 kg/m²     Physical Exam  Constitutional:       Appearance: He is ill-appearing  HENT:      Mouth/Throat:      Mouth: Mucous membranes are dry  Eyes:      Extraocular Movements: Extraocular movements intact  Pupils: Pupils are equal, round, and reactive to light  Cardiovascular:      Rate and Rhythm: Normal rate and regular rhythm  Pulses: Normal pulses  Heart sounds: Normal heart sounds  Pulmonary:      Effort: Pulmonary effort is normal    Abdominal:      General: Abdomen is flat  Bowel sounds are normal       Palpations: Abdomen is soft  Musculoskeletal:      Cervical back: Normal range of motion  Skin:     General: Skin is warm and dry  Coloration: Skin is pale  Neurological:      Mental Status: He is alert and oriented to person, place, and time  Psychiatric:         Mood and Affect: Mood normal          Behavior: Behavior normal          Thought Content:  Thought content normal          Judgment: Judgment normal          Recent Results (from the past 48 hour(s))   CBC and differential    Collection Time: 06/09/22 10:22 PM   Result Value Ref Range    WBC 14 49 (H) 4 31 - 10 16 Thousand/uL    RBC 3 90 3 88 - 5 62 Million/uL    Hemoglobin 9 5 (L) 12 0 - 17 0 g/dL    Hematocrit 30 8 (L) 36 5 - 49 3 %    MCV 79 (L) 82 - 98 fL    MCH 24 4 (L) 26 8 - 34 3 pg    MCHC 30 8 (L) 31 4 - 37 4 g/dL    RDW 15 9 (H) 11 6 - 15 1 %    MPV 12 8 (H) 8 9 - 12 7 fL    Platelets 417 282 - 419 Thousands/uL    nRBC 0 /100 WBCs    Neutrophils Relative 82 (H) 43 - 75 %    Immat GRANS % 0 0 - 2 %    Lymphocytes Relative 10 (L) 14 - 44 %    Monocytes Relative 7 4 - 12 %    Eosinophils Relative 1 0 - 6 %    Basophils Relative 0 0 - 1 %    Neutrophils Absolute 11 91 (H) 1 85 - 7 62 Thousands/µL    Immature Grans Absolute 0 06 0 00 - 0 20 Thousand/uL    Lymphocytes Absolute 1 40 0 60 - 4 47 Thousands/µL    Monocytes Absolute 0 99 0 17 - 1 22 Thousand/µL    Eosinophils Absolute 0 08 0 00 - 0 61 Thousand/µL    Basophils Absolute 0 05 0 00 - 0 10 Thousands/µL   Comprehensive metabolic panel    Collection Time: 06/09/22 10:22 PM   Result Value Ref Range    Sodium 134 (L) 136 - 145 mmol/L    Potassium 5 2 3 5 - 5 3 mmol/L    Chloride 104 100 - 108 mmol/L    CO2 25 21 - 32 mmol/L    ANION GAP 5 4 - 13 mmol/L    BUN 34 (H) 5 - 25 mg/dL    Creatinine 2 65 (H) 0 60 - 1 30 mg/dL Glucose 136 65 - 140 mg/dL    Calcium 8 7 8 3 - 10 1 mg/dL    Corrected Calcium 9 6 8 3 - 10 1 mg/dL    AST 29 5 - 45 U/L    ALT 21 12 - 78 U/L    Alkaline Phosphatase 89 46 - 116 U/L    Total Protein 7 8 6 4 - 8 2 g/dL    Albumin 2 9 (L) 3 5 - 5 0 g/dL    Total Bilirubin 0 40 0 20 - 1 00 mg/dL    eGFR 22 ml/min/1 73sq m   Lactic acid    Collection Time: 06/09/22 10:22 PM   Result Value Ref Range    LACTIC ACID 1 1 0 5 - 2 0 mmol/L   Procalcitonin    Collection Time: 06/09/22 10:22 PM   Result Value Ref Range    Procalcitonin 0 28 (H) <=0 25 ng/ml   Protime-INR    Collection Time: 06/09/22 10:22 PM   Result Value Ref Range    Protime 14 6 (H) 11 6 - 14 5 seconds    INR 1 18 0 84 - 1 19   APTT    Collection Time: 06/09/22 10:22 PM   Result Value Ref Range    PTT 34 23 - 37 seconds   Blood culture #1    Collection Time: 06/09/22 10:22 PM    Specimen: Arm, Left; Blood   Result Value Ref Range    Blood Culture Received in Microbiology Lab  Culture in Progress  Blood culture #2    Collection Time: 06/09/22 10:22 PM    Specimen: Arm, Right; Blood   Result Value Ref Range    Blood Culture Received in Microbiology Lab  Culture in Progress      Urinalysis with microscopic    Collection Time: 06/09/22 10:28 PM   Result Value Ref Range    Color, UA Light Yellow     Clarity, UA Clear     Specific [place], UA 1 011 1 003 - 1 030    pH, UA 7 0 4 5, 5 0, 5 5, 6 0, 6 5, 7 0, 7 5, 8 0    Leukocytes, UA Small (A) Negative    Nitrite, UA Negative Negative    Protein, UA 50 (1+) (A) Negative mg/dl    Glucose, UA Negative Negative mg/dl    Ketones, UA Negative Negative mg/dl    Urobilinogen, UA <2 0 <2 0 mg/dl mg/dl    Bilirubin, UA Negative Negative    Blood, UA Small (A) Negative    RBC, UA 4-10 (A) None Seen, 1-2 /hpf    WBC, UA 4-10 (A) None Seen, 1-2 /hpf    Epithelial Cells Occasional None Seen, Occasional /hpf    Bacteria, UA None Seen None Seen, Occasional /hpf   Fingerstick Glucose (POCT)    Collection Time: 06/10/22  1:12 AM Result Value Ref Range    POC Glucose 110 65 - 140 mg/dl   Basic metabolic panel    Collection Time: 06/10/22  5:05 AM   Result Value Ref Range    Sodium 138 136 - 145 mmol/L    Potassium 4 5 3 5 - 5 3 mmol/L    Chloride 108 100 - 108 mmol/L    CO2 22 21 - 32 mmol/L    ANION GAP 8 4 - 13 mmol/L    BUN 34 (H) 5 - 25 mg/dL    Creatinine 2 60 (H) 0 60 - 1 30 mg/dL    Glucose 96 65 - 140 mg/dL    Calcium 8 4 8 3 - 10 1 mg/dL    eGFR 23 ml/min/1 73sq m   Magnesium    Collection Time: 06/10/22  5:05 AM   Result Value Ref Range    Magnesium 1 5 (L) 1 6 - 2 6 mg/dL   CBC (With Platelets)    Collection Time: 06/10/22  5:05 AM   Result Value Ref Range    WBC 10 34 (H) 4 31 - 10 16 Thousand/uL    RBC 3 62 (L) 3 88 - 5 62 Million/uL    Hemoglobin 8 7 (L) 12 0 - 17 0 g/dL    Hematocrit 28 8 (L) 36 5 - 49 3 %    MCV 80 (L) 82 - 98 fL    MCH 24 0 (L) 26 8 - 34 3 pg    MCHC 30 2 (L) 31 4 - 37 4 g/dL    RDW 15 9 (H) 11 6 - 15 1 %    Platelets 729 (L) 007 - 390 Thousands/uL    MPV 12 8 (H) 8 9 - 12 7 fL   Protime-INR    Collection Time: 06/10/22  5:05 AM   Result Value Ref Range    Protime 15 3 (H) 11 6 - 14 5 seconds    INR 1 26 (H) 0 84 - 1 19   Fingerstick Glucose (POCT)    Collection Time: 06/10/22  7:35 AM   Result Value Ref Range    POC Glucose 92 65 - 140 mg/dl       CT abdomen pelvis wo contrast    Result Date: 6/9/2022  Narrative: CT ABDOMEN AND PELVIS WITHOUT IV CONTRAST INDICATION:   Nephrostomy catheter displacement Fever, no perc neprhostomy output x 3days  COMPARISON:  5/23/2022  TECHNIQUE:  CT examination of the abdomen and pelvis was performed without intravenous contrast  This examination was performed without intravenous contrast in the context of the critical nationwide Omnipaque shortage  Axial, sagittal, and coronal 2D reformatted images were created from the source data and submitted for interpretation  Radiation dose length product (DLP) for this visit:  401 mGy-cm     This examination, like all CT scans declines performed in the [facility], was performed utilizing techniques to minimize radiation dose exposure, including the use of iterative reconstruction and automated exposure control  Enteric contrast was administered  FINDINGS: ABDOMEN LOWER CHEST:  No clinically significant abnormality identified in the visualized lower chest  LIVER/BILIARY TREE:  Unremarkable  GALLBLADDER:  No calcified gallstones  No pericholecystic inflammatory change  SPLEEN:  Unremarkable  PANCREAS:  Unremarkable  ADRENAL GLANDS:  Unremarkable  KIDNEYS/URETERS:  Right percutaneous nephrostomy tube noted and appears to terminate within the renal pelvis  There is mild right hydronephrosis with a small focus of gas within the upper pole collecting system  Infection is of concern  The left kidney is unremarkable  STOMACH AND BOWEL:  Unremarkable  APPENDIX:  A normal appendix was visualized  ABDOMINOPELVIC CAVITY:  No ascites  No pneumoperitoneum  No lymphadenopathy  VESSELS:  Unremarkable for patient's age  PELVIS REPRODUCTIVE ORGANS:  Prostatic calcifications noted  URINARY BLADDER:  Asymmetric thickening right posterior aspect of the bladder likely reflects some residual tumor  ABDOMINAL WALL/INGUINAL REGIONS:  Unremarkable  OSSEOUS STRUCTURES:  No acute fracture or destructive osseous lesion  Impression: Right percutaneous nephrostomy tube appears to terminate within the right renal pelvis with right hydronephrosis and a small focus of gas within the upper pole collecting system worrisome for infection unless there has been recent instrumentation  Correlate with urinalysis  Asymmetric right posterior bladder wall thickening may reflect treatment related changes after neoplasm resection  Difficult to entirely exclude some residual or recurrent tumor in this region in this unenhanced study   Workstation performed: [id number]     CT abdomen pelvis wo contrast    Result Date: 5/23/2022  Narrative: CT ABDOMEN AND PELVIS WITHOUT IV CONTRAST INDICATION:   Hematuria  COMPARISON:  5/23/2021  TECHNIQUE:  CT examination of the abdomen and pelvis was performed without intravenous contrast  This examination was performed without intravenous contrast in the context of the critical nationwide Omnipaque shortage  Axial, sagittal, and coronal 2D reformatted images were created from the source data and submitted for interpretation  Radiation dose length product (DLP) for this visit:  334 55 mGy-cm   This examination, like all CT scans performed in the [facility], was performed utilizing techniques to minimize radiation dose exposure, including the use of iterative  reconstruction and automated exposure control  Enteric contrast was not administered  FINDINGS: ABDOMEN LOWER CHEST:  There is linear atelectasis in the lingula and left lower lobes  Minimal dependent atelectasis noted in the lower lobes  LIVER/BILIARY TREE:  Unremarkable  GALLBLADDER:  Status post cholecystectomy  SPLEEN:  Unremarkable  PANCREAS:  Atrophic as before  ADRENAL GLANDS:  Unremarkable  KIDNEYS/URETERS:  There is persistent moderate right-sided hydroureteronephrosis without a discrete obstructing ureteral calculus seen  There is however a large bladder mass which measures approximately 7 4 x 6 6 cm  Differential considerations would include a large hematoma and/or neoplasm  There is possible soft tissue mass extending into the right ureterovesical junction  Previously there was hydronephrosis as well with suspected mild bladder wall thickening along the right posterior bladder wall  No hydronephrosis on the left  No CT evidence of nephrolithiasis  There is mild bilateral renal cortical thinning  STOMACH AND BOWEL:  No bowel obstruction  No focal inflammatory process  APPENDIX:  A normal appendix was visualized  ABDOMINOPELVIC CAVITY:  No ascites  No pneumoperitoneum  No lymphadenopathy   VESSELS:  The abdominal aorta is calcified but within normal limits for caliber  No retroperitoneal hematoma  PELVIS REPRODUCTIVE ORGANS:  Prostatic calcifications are seen  URINARY BLADDER:  As mentioned above in the kidney /ureter section  ABDOMINAL WALL/INGUINAL REGIONS:  Inflammatory stranding in the lower anterior abdominal wall, unchanged and may reflect scarring  OSSEOUS STRUCTURES:  Prominent Schmorl's node inferior endplate of L2  Additional multifocal Schmorl's nodes are seen in the lower thoracic spine  Old fracture deformity is of the right posterior 8th through 10th ribs  Impression: Persistent moderate right-sided hydroureteronephrosis without a discrete ureteral calculus  There is however a large bladder mass now seen which may reflect hematoma and/or neoplasm  There is possible soft tissue density extending into the right ureterovesical junction  This can be further evaluated with cystoscopy  Workstation performed: [id number]     XR chest portable    Result Date: 5/27/2022  Narrative: CHEST INDICATION:   Rule out pneumonia  Blood in urine  COMPARISON:  Chest radiograph and CT from 5/23/2021  EXAM PERFORMED/VIEWS:  XR CHEST PORTABLE FINDINGS:  Right PICC in lower SVC  Cardiomediastinal silhouette appears unremarkable  Low lung volumes with mild bibasilar atelectasis  No effusion or pneumothorax  Osseous structures appear within normal limits for patient age  Suture anchors in left humeral head  Impression: Mild bibasilar atelectasis with nothing to suggest pneumonia  Workstation performed: [id number]     IR nephrostomy tube placement    Result Date: 5/31/2022  Narrative: INDICATION: Malignant obstruction  PROCEDURE: 1  Right ultrasound guided renal collecting system access 2  Right nephrostogram 3  Right nephrostomy tube placement FINDINGS: 1  Severe right hydronephrosis, hematuria 2  Appropriate position of new 8-[place] right nephrostomy tube  Impression: Right nephrostomy tube placement  Recommend daily tube flush   Plan for routine exchange in 3 months  _______________________________________________________________ COMPARISON: CT abdomen 5/23/2022 PROCEDURE DETAILS: Operators: Dr Monica Maldonado Anesthesia: General and local anesthesia  Medications: 1% lidocaine, please refer to anesthesia medical record Contrast: 5 mL of Omnipaque 300 Fluoroscopy time: 2 3 minutes Images: 5 COMMENTS: A preprocedure timeout was performed per [facility] protocol  The right flank was prepped and draped in the usual sterile fashion  The right renal collecting system was accessed through a posterior lower pole calyx under continuous ultrasound guidance using a 21-gauge needle  Prompt return of bloody urine confirmed collecting system access  Injection of a small amount of contrast outlined a dilated renal collecting system  Under fluoroscopic guidance, a nitinol wire was advanced into the renal collecting system followed by needle exchanged for an AccuStick sheath  Antegrade nephrostogram was then performed  After J-wire advancement through the sheath, the sheath was removed and a 8-[place]bourgish nephrostomy tube was advanced with pigtail formed in the renal pelvis  Contrast injection confirmed appropriate positioning  The catheter was then flushed, secured to the skin with 2-0 Prolene, connected to bag drainage and sterile dressings applied  Workstation performed: Dominguez Rojas kidney and bladder    Result Date: 5/27/2022  Narrative: RENAL ULTRASOUND INDICATION:   Follow-up hydronephrosis  COMPARISON: Compared with CT of 5/23/2022 TECHNIQUE:   Ultrasound of the retroperitoneum was performed with a curvilinear transducer utilizing volumetric sweeps and still imaging techniques  FINDINGS: KIDNEYS: Symmetric and normal size  Right kidney:  9 4 x 5 0 x 5 5 cm  Volume 137 0 mL Left kidney:  9 5 x 4 4 x 4 3 cm  Volume 92 4 mL Right kidney Cortical thinning  No mass is identified  Mostly unchanged moderate to severe hydronephrosis  No shadowing calculi   No perinephric fluid collections  Left kidney Normal echogenicity and contour  No mass is identified  No hydronephrosis  No shadowing calculi  No perinephric fluid collections  URETERS: Nonvisualized  BLADDER: Webb catheter in the decompressed bladder  Impression: Mostly unchanged moderate to severe right kidney hydronephrosis with cortical thinning  Workstation performed: Aaliyah Marcus bedside procedure    Result Date: 6/9/2022  Narrative: 1 [id number]  2 449 [id number]      Labs and pertinent reports reviewed  This note has been generated by voice recognition software system  Therefore, there may be spelling, grammar, and or syntax errors  Please contact if questions arise

## 2022-06-10 NOTE — TELEPHONE ENCOUNTER
Patient assistance review in progress    06/20/22  Looked for funding for the patient's diagnosis with no success  I will go to the [facility] and add them to the Wait List for Bladder Cancer

## 2022-06-10 NOTE — ASSESSMENT & PLAN NOTE
Presents with generalized weakness along with chills and fevers reported at home   Suspect 2/2 UTI as below  · See treatment below  · PT/OT consult

## 2022-06-10 NOTE — ED PROVIDER NOTES
History  Chief Complaint   Patient presents with    Fever - 9 weeks to 74 years     Pt has been having a fever today  Has a drain on kidney  Recent urinary catheter removal   Decreased appetite  More tired than normal        26-year-old male presenting with problem with his perc nephrostomy tube, and new onset fevers  Patient was recently seen on 05/23/2022 with gross hematuria and at that time had a portion of his bladder resected by Urology and a subsequent percutaneous nephrostomy tube placed by Interventional Radiology  He was discharged last week with home health care coming to assist with care of his perc nephrostomy  Family who accompanied the patient states that since Monday (3 days ago) the perc nephrostomy drain has not been draining any fluid  Additionally, this evening during exam, it was noted that there was blood surrounding the perc nephrostomy insertion site  Patient began experiencing chills overnight last night and fevers according to family  T-max 100 2° orally  The patient is denying any difficulty with urination, abdominal pain, back pain, chest pain, shortness of breath  Prior to Admission Medications   Prescriptions Last Dose Informant Patient Reported? Taking?    Comfort EZ Pen Needles 32G X 4 MM MISC  Spouse/Significant Other No No   Sig: USE 3-4 AS DIRECTED   Continuous Blood Gluc  (FreeStyle Frank 14 Day [place]) NATALIA   No No   Sig: Use 1 Device continuous   Continuous Blood Gluc Sensor (FreeStyle Frank 14 Day Sensor) [facility]   No No   Sig: Use 1 Device 4 (four) times a day   INSULIN SYRINGE 1CC/29G 29G X 1/2" 1 ML MISC  Spouse/Significant Other Yes No   Sig: by Does not apply route   Incontinence Supply Disposable (Incontinence Brief Medium) MISC   No No   Sig: Use 4 (four) times a day   Lancet Devices (Lancing Device) MISC  Spouse/Significant Other No No   Sig: USE AS DIRECTED   Lancets MISC  Spouse/Significant Other Yes No   Sig: by Does not apply route   OneTouch Ultra test strip  Spouse/Significant Other No No   Sig: TEST UP TO 3 TIMES A DAY   acetaminophen (TYLENOL) 500 mg tablet  Spouse/Significant Other No No   Sig: Take 1 tablet (500 mg total) by mouth every 6 (six) hours as needed for mild pain   clopidogrel (PLAVIX) 75 mg tablet  Spouse/Significant Other No No   Sig: Take 1 tablet (75 mg total) by mouth daily   insulin detemir (Levemir FlexTouch) 100 Units/mL injection pen  Spouse/Significant Other No No   Sig: Inject 18 Units under the skin daily at bedtime   pregabalin (LYRICA) 50 mg capsule  Spouse/Significant Other No No   Sig: TAKE 1 CAPSULE (50 MG TOTAL) BY MOUTH 3 (THREE) TIMES A DAY   rosuvastatin (CRESTOR) 40 MG tablet   No No   Sig: TAKE ONE (1) TABLET BY MOUTH DAILY   sodium chloride, PF, 0 9 %  Spouse/Significant Other No No   Sig: 10 mL by Intracatheter route daily Intracatheter flushing daily   May substitute prefilled syringe with normal saline 10 mL vials, 10 mL syringes, and 18 g blunt needles   tacrolimus (PROGRAF) 1 mg capsule  Spouse/Significant Other No No   Sig: Take 1 capsule (1 mg total) by mouth every 12 (twelve) hours   tamsulosin (FLOMAX) 0 4 mg  Spouse/Significant Other No No   Sig: TAKE 1 CAPSULE(0 4 MG) BY MOUTH DAILY WITH DINNER   temazepam (RESTORIL) 30 mg capsule  Spouse/Significant Other No No   Sig: TAKE 1 CAPSULE (30 MG TOTAL) BY MOUTH DAILY AT BEDTIME   triamcinolone (KENALOG) 0 1 % cream  Spouse/Significant Other No No   Sig: APPLY TO AFFECTED AREA TWICE DAILY   Patient not taking: No sig reported   zolpidem (AMBIEN) 10 mg tablet  Spouse/Significant Other No No   Sig: Take 1 tablet (10 mg total) by mouth daily at bedtime      Facility-Administered Medications: None       Past Medical History:   Diagnosis Date    [facility]     Cerebral artery aneurysm     Change in mental state     last assessed 5/18/15; resolved 10/27/15    Diabetes mellitus ([facility] )     Drug dependence ([place] [address] )     Fatigue     last assessed 1/26/15; resolved 5/24/16    Hepatitis [address] discharge follow-up 12/21/2018    Kidney disease     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     [facility]     Renal disorder     Subdural hygroma     2/27/14; resolved 7/28/15    Thrombocytopenia ([facility] ) 9/20/2017       Past Surgical History:   Procedure Laterality Date    BRAIN SURGERY  02/12/2014    left frontotemporal 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    CATARACT EXTRACTION      CHOLECYSTECTOMY      FL LUMBAR PUNCTURE DIAGNOSTIC  12/14/2018    IR NEPHROSTOMY TUBE PLACEMENT  5/28/2022    IR PICC LINE  12/14/2018    LIVER TRANSPLANTATION      ROTATOR CUFF REPAIR      SHOULDER SURGERY      TRANSURETHRAL RESECTION OF BLADDER TUMOR N/A 5/26/2022    Procedure: TRANSURETHRAL RESECTION OF BLADDER TUMOR (TURBT); Surgeon: Mack Correa MD;  [facility];  Service: Urology       Family History   Problem Relation Age of Onset    Hypertension Mother         benign essential     Lung cancer Father     Diabetes Sister     Cancer Brother     Stroke Other         cva - due to embolism of cerebra artery      I have reviewed and agree with the history as documented  E-Cigarette/Vaping    E-Cigarette Use Never User      E-Cigarette/Vaping Substances    Nicotine No     THC No     CBD No     Flavoring No     Other No     Unknown No      Social History     Tobacco Use    Smoking status: Never Smoker    Smokeless tobacco: Never Used    Tobacco comment: former smoker per allscripts    Vaping Use    Vaping Use: Never used   Substance Use Topics    Alcohol use: Not Currently    Drug use: No     Comment: remotely quit drug use per allscripts         Review of Systems   Constitutional: Negative for chills, fatigue and fever  HENT: Negative for congestion and sore throat  Eyes: Negative for pain and visual disturbance     Respiratory: Negative for cough, chest tightness and shortness of breath  Cardiovascular: Negative for chest pain and palpitations  Gastrointestinal: Negative for abdominal pain, blood in stool, constipation, diarrhea, nausea and vomiting  Genitourinary: Negative for dysuria, flank pain and hematuria  Musculoskeletal: Negative for arthralgias, back pain and neck pain  Skin: Positive for wound  Negative for color change and rash  Neurological: Negative for dizziness, syncope and light-headedness  Hematological: Negative for adenopathy  Does not bruise/bleed easily  All other systems reviewed and are negative  Physical Exam  ED Triage Vitals   Temperature Pulse Respirations Blood Pressure SpO2   06/09/22 2008 06/09/22 2008 06/09/22 2008 06/09/22 2008 06/09/22 2008   99 3 °F (37 4 °C) 89 20 134/69 98 %      Temp Source Heart Rate Source Patient Position - Orthostatic VS BP Location FiO2 (%)   06/09/22 2008 06/09/22 2330 06/10/22 0000 06/10/22 0000 --   Oral Monitor Lying Left arm       Pain Score       06/10/22 0101       10 - Worst Possible Pain             Orthostatic Vital Signs  Vitals:    06/09/22 2008 06/09/22 2330 06/10/22 0000 06/10/22 0022   BP: 134/69 104/65 108/57 125/62   Pulse: 89 76 72    Patient Position - Orthostatic VS:   Lying        Physical Exam  Vitals and nursing note reviewed  Constitutional:       General: He is not in acute distress  Appearance: He is well-developed and normal weight  He is not toxic-appearing or diaphoretic  Comments: Patient is alert but sleepy  HENT:      Head: Normocephalic and atraumatic  Right Ear: External ear normal       Left Ear: External ear normal       Nose: Nose normal  No congestion or rhinorrhea  Mouth/Throat:      Mouth: Mucous membranes are moist       Pharynx: No oropharyngeal exudate or posterior oropharyngeal erythema  Eyes:      General: No scleral icterus  Extraocular Movements: Extraocular movements intact        Conjunctiva/sclera: Conjunctivae normal  Pupils: Pupils are equal, round, and reactive to light  Cardiovascular:      Rate and Rhythm: Normal rate and regular rhythm  Pulses: Normal pulses  Heart sounds: No murmur heard  Pulmonary:      Effort: Pulmonary effort is normal  No respiratory distress  Breath sounds: Normal breath sounds  No wheezing or rales  Abdominal:      Palpations: Abdomen is soft  There is no mass  Tenderness: There is no abdominal tenderness  There is no right CVA tenderness, left CVA tenderness or guarding  Hernia: No hernia is present  Musculoskeletal:         General: No swelling or deformity  Normal range of motion  Cervical back: Normal range of motion and neck supple  Back:       Right lower leg: No edema  Left lower leg: No edema  Skin:     General: Skin is warm and dry  Capillary Refill: Capillary refill takes less than 2 seconds  Findings: No bruising or erythema  Neurological:      General: No focal deficit present  Mental Status: He is alert and oriented to person, place, and time  Mental status is at baseline  Sensory: No sensory deficit  Motor: No weakness     Psychiatric:         Mood and Affect: Mood normal          Behavior: Behavior normal          ED Medications  Medications   sodium chloride 0 9 % infusion (90 mL/hr Intravenous New Bag 6/10/22 0100)   acetaminophen (TYLENOL) tablet 650 mg (650 mg Oral Given 6/10/22 0101)   docusate sodium (COLACE) capsule 100 mg (has no administration in time range)   ondansetron (ZOFRAN) injection 4 mg (has no administration in time range)   heparin (porcine) subcutaneous injection 5,000 Units (5,000 Units Subcutaneous Given 6/10/22 0101)   insulin lispro (HumaLOG) 100 units/mL subcutaneous injection 1-5 Units (has no administration in time range)   insulin lispro (HumaLOG) 100 units/mL subcutaneous injection 1-5 Units (1 Units Subcutaneous Not Given 6/10/22 0113)   cefTRIAXone (ROCEPHIN) 1,000 mg in dextrose 5 % 50 mL IVPB (has no administration in time range)   temazepam (RESTORIL) capsule 30 mg (30 mg Oral Given 6/10/22 0124)   clopidogrel (PLAVIX) tablet 75 mg (has no administration in time range)   insulin detemir (LEVEMIR) subcutaneous injection 18 Units (has no administration in time range)   pregabalin (LYRICA) capsule 50 mg (has no administration in time range)   tacrolimus (PROGRAF) capsule 1 mg (has no administration in time range)   tamsulosin (FLOMAX) capsule 0 4 mg (has no administration in time range)   ceftriaxone (ROCEPHIN) 1 g/50 mL in dextrose IVPB (0 mg Intravenous Stopped 6/10/22 0012)       Diagnostic Studies  Results Reviewed     Procedure Component Value Units Date/Time    Procalcitonin [[id number]]  (Abnormal) Collected: 06/09/22 2222    Lab Status: Final result Specimen: Blood from Arm, Right Updated: 06/10/22 0035     Procalcitonin 0 28 ng/ml     Lactic acid [[id number]]  (Normal) Collected: 06/09/22 2222    Lab Status: Final result Specimen: Blood from Arm, Right Updated: 06/09/22 2342     LACTIC ACID 1 1 mmol/L     Narrative:      Result may be elevated if tourniquet was used during collection      Comprehensive metabolic panel [[id number]]  (Abnormal) Collected: 06/09/22 2222    Lab Status: Final result Specimen: Blood from Arm, Right Updated: 06/09/22 2316     Sodium 134 mmol/L      Potassium 5 2 mmol/L      Chloride 104 mmol/L      CO2 25 mmol/L      ANION GAP 5 mmol/L      BUN 34 mg/dL      Creatinine 2 65 mg/dL      Glucose 136 mg/dL      Calcium 8 7 mg/dL      Corrected Calcium 9 6 mg/dL      AST 29 U/L      ALT 21 U/L      Alkaline Phosphatase 89 U/L      Total Protein 7 8 g/dL      Albumin 2 9 g/dL      Total Bilirubin 0 40 mg/dL      eGFR 22 ml/min/1 73sq m     Narrative:      [place] guidelines for Chronic Kidney Disease (CKD):     Stage 1 with normal or high GFR (GFR > 90 mL/min/1 73 square meters)    Stage 2 Mild CKD (GFR = 60-89 mL/min/1 73 square meters)    Stage 3A Moderate CKD (GFR = 45-59 mL/min/1 73 square meters)    Stage 3B Moderate CKD (GFR = 30-44 mL/min/1 73 square meters)    Stage 4 Severe CKD (GFR = 15-29 mL/min/1 73 square meters)    Stage 5 End Stage CKD (GFR <15 mL/min/1 73 square meters)  Note: GFR calculation is accurate only with a steady state creatinine    CBC and differential [[id number]]  (Abnormal) Collected: 06/09/22 2222    Lab Status: Final result Specimen: Blood from Arm, Right Updated: 06/09/22 2316     WBC 14 49 Thousand/uL      RBC 3 90 Million/uL      Hemoglobin 9 5 g/dL      Hematocrit 30 8 %      MCV 79 fL      MCH 24 4 pg      MCHC 30 8 g/dL      RDW 15 9 %      MPV 12 8 fL      Platelets 168 Thousands/uL      nRBC 0 /100 WBCs      Neutrophils Relative 82 %      Immat GRANS % 0 %      Lymphocytes Relative 10 %      Monocytes Relative 7 %      Eosinophils Relative 1 %      Basophils Relative 0 %      Neutrophils Absolute 11 91 Thousands/µL      Immature Grans Absolute 0 06 Thousand/uL      Lymphocytes Absolute 1 40 Thousands/µL      Monocytes Absolute 0 99 Thousand/µL      Eosinophils Absolute 0 08 Thousand/µL      Basophils Absolute 0 05 Thousands/µL     Urinalysis with microscopic [[id number]]  (Abnormal) Collected: 06/09/22 2228    Lab Status: Final result Specimen: Urine, Other Updated: 06/09/22 2314     Color, UA Light Yellow     Clarity, UA Clear     Specific Gravity, UA 1 011     pH, UA 7 0     Leukocytes, UA Small     Nitrite, UA Negative     Protein, UA 50 (1+) mg/dl      Glucose, UA Negative mg/dl      Ketones, UA Negative mg/dl      Urobilinogen, UA <2 0 mg/dl      Bilirubin, UA Negative     Blood, UA Small     RBC, UA 4-10 /hpf      WBC, UA 4-10 /hpf      Epithelial Cells Occasional /hpf      Bacteria, UA None Seen /hpf     Protime-INR [[id number]]  (Abnormal) Collected: 06/09/22 2222    Lab Status: Final result Specimen: Blood from Arm, Right Updated: 06/09/22 2311     Protime 14 6 seconds      INR 1 18 APTT [[id number]]  (Normal) Collected: 06/09/22 2222    Lab Status: Final result Specimen: Blood from Arm, Right Updated: 06/09/22 2311     PTT 34 seconds     Blood culture #1 [[id number]] Collected: 06/09/22 2222    Lab Status: In process Specimen: Blood from Arm, Left Updated: 06/09/22 2249    Blood culture #2 [[id number]] Collected: 06/09/22 2222    Lab Status: In process Specimen: Blood from Arm, Right Updated: 06/09/22 2249    Urine culture [[id number]] Collected: 06/09/22 2229    Lab Status: In process Specimen: Urine, Other Updated: 06/09/22 2247                 CT abdomen pelvis wo contrast   Final Result by Sarwat Sutton MD (06/09 2142)   Right percutaneous nephrostomy tube appears to terminate within the right renal pelvis with right hydronephrosis and a small focus of gas within the upper pole collecting system worrisome for infection unless there has been recent instrumentation  Correlate with urinalysis  Asymmetric right posterior bladder wall thickening may reflect treatment related changes after neoplasm resection  Difficult to entirely exclude some residual or recurrent tumor in this region in this unenhanced study  Workstation performed: [id number]               Procedures  Procedures      ED Course                                       MDM  Number of Diagnoses or Management Options  Fatigue  Fever  Infection of renal pelvis and ureter  Diagnosis management comments:   CC:  Perc nephrostomy problem, fevers    Assessment:  On exam, it was noted that the stopcock of the patient's perc nephrostomy drain was closed and an on-off position  Drain was opened and nephrostomy bag began immediately draining serosanguineous fluid  Patient's fevers are likely due to infectious source likely due to infection near his perc nephrostomy insertion site    Urology was notified and advised that patient should be admitted to internal medicine service with drain assessment tomorrow by Interventional Radiology and IV antibiotics overnight  IV antibiotics begun empirically, ceftriaxone, and patient was admitted for further management by Internal Medicine team       Disposition  Final diagnoses:   Infection of renal pelvis and ureter   Fever   Fatigue     Time reflects when diagnosis was documented in both MDM as applicable and the Disposition within this note     Time User Action Codes Description Comment    6/9/2022 11:27 PM Lisa Needles Add [N28 85] Infection of renal pelvis and ureter     6/9/2022 11:44 PM Lisa Needles Add [R50 9] Fever     6/9/2022 11:44 PM Lisa Needles Add [R53 83] Fatigue       ED Disposition     ED Disposition   Admit    Condition   Stable    Date/Time   Thu Jun 9, 2022 11:44 PM    Comment   Case was discussed with Dr Denver Ponder and the patient's admission status was agreed to be Admission Status: inpatient status to the service of Dr Denver Ponder              Follow-up Information    None         Current Discharge Medication List      CONTINUE these medications which have NOT CHANGED    Details   acetaminophen (TYLENOL) 500 mg tablet Take 1 tablet (500 mg total) by mouth every 6 (six) hours as needed for mild pain  Qty: 30 tablet, Refills: 0    Associated Diagnoses: Closed fracture of multiple ribs of right side, initial encounter; Fall, initial encounter      clopidogrel (PLAVIX) 75 mg tablet Take 1 tablet (75 mg total) by mouth daily  Qty: 90 tablet, Refills: 3    Associated Diagnoses: Left thalamic infarction (HCC)      Comfort EZ Pen Needles 32G X 4 MM MISC USE 3-4 AS DIRECTED  Qty: 100 each, Refills: 5    Associated Diagnoses: Controlled type 2 diabetes mellitus with diabetic neuropathy, with long-term current use of insulin (HCC)      Continuous Blood Gluc  (FreeStyle Frank 14 Day [place]) NATALIA Use 1 Device continuous  Qty: 1 each, Refills: 0    Associated Diagnoses: Type 2 diabetes mellitus with diabetic peripheral angiopathy without gangrene, with long-term current use of insulin ([facility])      Continuous Blood Gluc Sensor (FreeStyle Frank 14 Day Sensor) MISC Use 1 Device 4 (four) times a day  Qty: 1 each, Refills: 5    Associated Diagnoses: Type 2 diabetes mellitus with diabetic peripheral angiopathy without gangrene, with long-term current use of insulin ([facility])      Incontinence Supply Disposable (Incontinence Brief Medium) MISC Use 4 (four) times a day  Qty: 120 each, Refills: 5    Associated Diagnoses: Bladder tumor; Urinary incontinence, unspecified type      insulin detemir (Levemir FlexTouch) 100 Units/mL injection pen Inject 18 Units under the skin daily at bedtime  Qty: 15 mL, Refills: 0    Associated Diagnoses: Controlled type 2 diabetes mellitus with diabetic neuropathy, with long-term current use of insulin ([facility])      INSULIN SYRINGE 1CC/29G 29G X 1/2" 1 ML MISC by Does not apply route      Lancet Devices (Lancing Device) MISC USE AS DIRECTED  Qty: 1 each, Refills: 0    Associated Diagnoses: Controlled type 2 diabetes mellitus with diabetic neuropathy, with long-term current use of insulin ([facility])      Lancets MISC by Does not apply route      OneTouch Ultra test strip TEST UP TO 3 TIMES A DAY  Qty: 100 each, Refills: 0    Associated Diagnoses: Controlled type 2 diabetes mellitus without complication, with long-term current use of insulin ([facility])      pregabalin (LYRICA) 50 mg capsule TAKE 1 CAPSULE (50 MG TOTAL) BY MOUTH 3 (THREE) TIMES A DAY  Qty: 90 capsule, Refills: 5    Associated Diagnoses: Neuropathy      rosuvastatin (CRESTOR) 40 MG tablet TAKE ONE (1) TABLET BY MOUTH DAILY  Qty: 30 tablet, Refills: 5    Associated Diagnoses: Mixed hyperlipidemia      sodium chloride, PF, 0 9 % 10 mL by Intracatheter route daily Intracatheter flushing daily   May substitute prefilled syringe with normal saline 10 mL vials, 10 mL syringes, and 18 g blunt needles  Qty: 300 mL, Refills: 2    Associated Diagnoses: Hydronephrosis of right kidney      tacrolimus (PROGRAF) 1 mg capsule Take 1 capsule (1 mg total) by mouth every 12 (twelve) hours  Qty: 180 capsule, Refills: 3    Associated Diagnoses: Liver transplanted (HCC)      tamsulosin (FLOMAX) 0 4 mg TAKE 1 CAPSULE(0 4 MG) BY MOUTH DAILY WITH DINNER  Qty: 90 capsule, Refills: 6    Comments: **Patient requests 90 days supply**  Associated Diagnoses: Weak urine stream; Benign prostatic hyperplasia without lower urinary tract symptoms      temazepam (RESTORIL) 30 mg capsule TAKE 1 CAPSULE (30 MG TOTAL) BY MOUTH DAILY AT BEDTIME  Qty: 30 capsule, Refills: 5    Associated Diagnoses: Insomnia, unspecified type      triamcinolone (KENALOG) 0 1 % cream APPLY TO AFFECTED AREA TWICE DAILY  Qty: 80 g, Refills: 5    Associated Diagnoses: Rash      zolpidem (AMBIEN) 10 mg tablet Take 1 tablet (10 mg total) by mouth daily at bedtime  Qty: 30 tablet, Refills: 5    Associated Diagnoses: Adjustment insomnia           No discharge procedures on file  PDMP Review       Value Time User    PDMP Reviewed  Yes 5/23/2022  4:52 PM Michelle Williamson DO           ED Provider  Attending physically available and evaluated Susy Diallo  I managed the patient along with the ED Attending      Electronically Signed by         Ирина Rogers MD  06/10/22 2804

## 2022-06-10 NOTE — ASSESSMENT & PLAN NOTE
Recent admission for gross hematuria, hydronephrosis of R kidney  · S/p nephrostomy tube placement in IR--may require IR consult, defer to urology

## 2022-06-10 NOTE — ASSESSMENT & PLAN NOTE
Lab Results   Component Value Date    EGFR 23 06/10/2022    EGFR 22 06/09/2022    EGFR 23 05/30/2022    CREATININE 2 60 (H) 06/10/2022    CREATININE 2 65 (H) 06/09/2022    CREATININE 2 54 (H) 05/30/2022   · Follows with [facility]  · Creatinine baseline approx 2 9  · Monitor BMP

## 2022-06-10 NOTE — ED NOTES
This nurse attempt IV without success, Dr Williemae Babinski made aware        King Oden, RN  06/09/22 0694

## 2022-06-10 NOTE — ASSESSMENT & PLAN NOTE
History of liver transplant in 2003, maintained on Tacrolimus managed by [facility] transplant  · Continue home dose of Prograf 1 mg q 12 hours

## 2022-06-10 NOTE — CONSULTS
Consultation - Palliative and Supportive Care   Wyatt Pool 74 y o  male [id number]    Patient Active Problem List   Diagnosis    History of CVA (cerebrovascular accident)    Controlled diabetes mellitus with diabetic neuropathy, with long-term current use of insulin ([facility]    Liver transplant status ([facility] )    History of immunosuppression therapy    History of hepatitis C    Acute kidney injury superimposed on CKD-4    Thrombocytopenia ([facility] )    Congenital anomaly of accessory auricle    Cerebral arterial aneurysm    Erectile dysfunction of non-organic origin    Headache, chronic daily    Insomnia    Migraine headache    Occipital neuralgia    History of cirrhosis    Peripheral neuropathy    Prurigo nodularis    Pruritus    Spondylosis of cervical region without myelopathy or radiculopathy    BPPV (benign paroxysmal positional vertigo), unspecified laterality    Vitamin D deficiency    Medicare annual wellness visit, subsequent    Screening for colon cancer    Hepatitis B core antibody positive    Rash    Hyperlipidemia    Type 2 diabetes mellitus with diabetic peripheral angiopathy without gangrene, with long-term current use of insulin (HCC)    Type 2 diabetes mellitus with chronic kidney disease (HCC)    Hand lesion    CKD (chronic kidney disease) stage 4, GFR 15-29 ml/min (HCC)    Subdural hygroma    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HCC)    Mild cognitive impairment    Gross hematuria    Hydronephrosis of right kidney    Bladder tumor    Acute blood loss anemia   [facility] discharge follow-up    Generalized weakness    Leukocytosis    Abnormal CT of the abdomen    Nephrostomy status ([facility] )     Active issues specifically addressed today include: Invasive high-grade papillary urothelial carcinoma  Insomnia  Palliative care encounter    Plan:  1   Symptom management - patient currently remains asymptomatic except for his baseline insomnia that he takes medication for he does not feel like he needs any changes to his regimen at this time  2  Goals - will await further input from our oncology colleagues before discussing goals of care further  3  Psychosocial support - time spent providing supportive listening       4  PSC follow-up- palliative will follow-up on Monday for ongoing discussions of still hospitalized if not we will arrange outpatient follow-up with our clinic  Code Status:  Full - Level 1   Decisional apparatus:  Patient is competent on my exam today  If competence is lost, patient's substitute decision maker would default to NA by [place]  Advance Directive / Living Will / POLST:  no     I have reviewed the patient's controlled substance dispensing history in the Prescription Drug Monitoring Program in compliance with the [facility] regulations before prescribing any controlled substances  We appreciate the invitation to be involved in this patient's care  We will follow on Monday  Please do not hesitate to reach our on call provider through our clinic answering service at  should you have acute symptom control concerns  Amaris Navarrete DO  Palliative and Supportive Care  Clinic/Answering Service: [phone]  You can find me on [employer]! IDENTIFICATION:  Inpatient consult to Palliative Care  Consult performed by: Amaris Navarrete DO  Consult ordered by: Dagmar Hong MD        Physician Requesting Consult: Irina Gar MD  Reason for Consult / Principal Problem:  New diagnosis of urothelial cancer  Hx and PE limited by: NA    HISTORY OF PRESENT ILLNESS:       Attila King is a 74 y o  male who presented on June 9th with worsening weakness and fatigue  Patient has history of liver transplant, CKD 4, insulin-dependent diabetes and others as detailed below    Patient had been hospitalized in May of this year after gross hematuria and underwent bladder resection by Urology with subsequent percutaneous nephrostomy tube placement  Patient was admitted for further management of his nephrostomy tube and concern for infection given febrile in the ER and leukocytosis  Patient is currently resting comfortably in bed  He denies any pain complaints  He states that he still somewhat confused about his diagnosis as he has been told both that he has cancer and that he does not have cancer  Did discuss the role of palliative care and patient is living with series illness and he agrees with ongoing follow-up with our team   Currently states that he is comfortable and does not feel like he needs any medication adjustments at this time  Review of Systems   Constitutional: Negative for decreased appetite  Gastrointestinal: Negative for abdominal pain  Psychiatric/Behavioral: The patient has insomnia  All other systems reviewed and are negative        Past Medical History:   Diagnosis Date    [facility]ism [facility]     Cerebral artery aneurysm     Change in mental state     last assessed 5/18/15; resolved 10/27/15    Diabetes mellitus ([facility] )     [facility] dependence [facility]     Fatigue     last assessed 1/26/15; resolved 5/24/16    Hepatitis [facility] discharge follow-up 12/21/2018    Kidney disease     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[facility] )     [facility])     Renal disorder     Subdural hygroma     2/27/14; resolved 7/28/15    Thrombocytopenia ([facility] ) 9/20/2017     Past Surgical History:   Procedure Laterality Date    BRAIN SURGERY  02/12/2014    left frontotemporal 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    CATARACT EXTRACTION      CHOLECYSTECTOMY      FL LUMBAR PUNCTURE DIAGNOSTIC  12/14/2018    IR NEPHROSTOMY TUBE PLACEMENT  5/28/2022    IR PICC LINE  12/14/2018    LIVER TRANSPLANTATION      ROTATOR CUFF REPAIR      SHOULDER SURGERY      TRANSURETHRAL RESECTION OF BLADDER TUMOR N/A 5/26/2022    Procedure: TRANSURETHRAL RESECTION OF BLADDER TUMOR (TURBT); Surgeon: Boom Penny MD;  Location: [facility];  Service: Urology     Social History     Socioeconomic History    Marital status: /Civil Union     Spouse name: Not on file    Number of children: Not on file    Years of education: less then high school    Highest education level: Not on file   Occupational History    Occupation: physical disability    Tobacco Use    Smoking status: Never Smoker    Smokeless tobacco: Never Used    Tobacco comment: former smoker per allscripts    Vaping Use    Vaping Use: Never used   Substance and Sexual Activity    Alcohol use: Not Currently    Drug use: No     Comment: remotely quit drug use per allscripts     Sexual activity: Never   Other Topics Concern    Not on file   Social History Narrative    Caffeine use     Living with significant other , girlfriend and daughter      Social Determinants of Health     Financial Resource Strain: Not on file   Food Insecurity: No Food Insecurity    Worried About Running Out of Food in the Last Year: Never true    [address] in the Last Year: Never true   Transportation Needs: Unknown    Lack of Transportation (Medical): Not on file    Lack of Transportation (Non-Medical):  No   Physical Activity: Not on file   Stress: Not on file   Social Connections: Not on file   Intimate Partner Violence: Not on file   Housing Stability: Low Risk     Unable to Pay for Housing in the Last Year: No    Number of Places Lived in the Last Year: 1    Unstable Housing in the Last Year: No     Family History   Problem Relation Age of Onset    Hypertension Mother         benign essential     Lung cancer Father     Diabetes Sister     Cancer Brother     Stroke Other         cva - due to embolism of cerebra artery        MEDICATIONS / ALLERGIES:    all current active meds have been reviewed and current meds:   Current Facility-Administered Medications   Medication Dose Route Frequency    acetaminophen (TYLENOL) tablet 650 mg  650 mg Oral Q6H PRN    cefTRIAXone (ROCEPHIN) 1,000 mg in dextrose 5 % 50 mL IVPB  1,000 mg Intravenous Q24H    clopidogrel (PLAVIX) tablet 75 mg  75 mg Oral Daily    docusate sodium (COLACE) capsule 100 mg  100 mg Oral BID    heparin (porcine) subcutaneous injection 5,000 Units  5,000 Units Subcutaneous Q8H [facility]    insulin detemir (LEVEMIR) subcutaneous injection 18 Units  18 Units Subcutaneous HS    insulin lispro (HumaLOG) 100 units/mL subcutaneous injection 1-5 Units  1-5 Units Subcutaneous TID AC    insulin lispro (HumaLOG) 100 units/mL subcutaneous injection 1-5 Units  1-5 Units Subcutaneous HS    magnesium sulfate 2 g/50 mL IVPB (premix) 2 g  2 g Intravenous Once    ondansetron (ZOFRAN) injection 4 mg  4 mg Intravenous Q6H PRN    pregabalin (LYRICA) capsule 50 mg  50 mg Oral TID    tacrolimus (PROGRAF) capsule 1 mg  1 mg Oral Q12H    tamsulosin (FLOMAX) capsule 0 4 mg  0 4 mg Oral Daily With Dinner    temazepam (RESTORIL) capsule 30 mg  30 mg Oral HS       Allergies   Allergen Reactions    Tequin [Gatifloxacin] Rash    Ciprofloxacin     Iv Contrast [Iodinated Diagnostic Agents]     Levofloxacin Rash    Lipitor [Atorvastatin] Rash     Rash and itching    Omnipaque [Iohexol]        OBJECTIVE:    Physical Exam  Physical Exam  Vitals and nursing note reviewed  Constitutional:       General: He is not in acute distress  Appearance: He is ill-appearing  HENT:      Head: Normocephalic and atraumatic  Right Ear: External ear normal       Left Ear: External ear normal       Nose: Nose normal    Eyes:      General: No scleral icterus  Right eye: No discharge  Left eye: No discharge  Cardiovascular:      Rate and Rhythm: Normal rate and regular rhythm  Pulmonary:      Effort: Pulmonary effort is normal  No respiratory distress  Breath sounds: Normal breath sounds  Abdominal:      General: Bowel sounds are normal  There is no distension  Palpations: Abdomen is soft  Skin:     General: Skin is warm and dry  Coloration: Skin is pale  Neurological:      Mental Status: He is alert and oriented to person, place, and time  Psychiatric:         Mood and Affect: Mood normal          Behavior: Behavior normal          Thought Content: Thought content normal          Judgment: Judgment normal          Lab Results:   I have personally reviewed pertinent labs  , CBC:   Lab Results   Component Value Date    WBC 10 34 (H) 06/10/2022    HGB 8 7 (L) 06/10/2022    HCT 28 8 (L) 06/10/2022    MCV 80 (L) 06/10/2022     (L) 06/10/2022    MCH 24 0 (L) 06/10/2022    MCHC 30 2 (L) 06/10/2022    RDW 15 9 (H) 06/10/2022    MPV 12 8 (H) 06/10/2022    NRBC 0 06/09/2022   , CMP:   Lab Results   Component Value Date    SODIUM 138 06/10/2022    K 4 5 06/10/2022     06/10/2022    CO2 22 06/10/2022    BUN 34 (H) 06/10/2022    CREATININE 2 60 (H) 06/10/2022    CALCIUM 8 4 06/10/2022    AST 29 06/09/2022    ALT 21 06/09/2022    ALKPHOS 89 06/09/2022    EGFR 23 06/10/2022   , PT/PTT:  Lab Results   Component Value Date    PTT 34 06/09/2022     Imaging Studies:  Reviewed on PACS  EKG, Pathology, and Other Studies:  Reviewed    Counseling / Coordination of Care    Total floor / unit time spent today 35+ minutes  Greater than 50% of total time was spent with the patient and / or family counseling and / or coordination of care  A description of the counseling / coordination of care:  Chart review, medication review, review palliative Services, supportive listening       Portions of this document may have been created using dictation software and as such some "sound alike" terms may have been generated by the system  Do not hesitate to contact me with any questions or clarifications

## 2022-06-11 VITALS
RESPIRATION RATE: 16 BRPM | BODY MASS INDEX: 22.68 KG/M2 | SYSTOLIC BLOOD PRESSURE: 106 MMHG | HEART RATE: 66 BPM | OXYGEN SATURATION: 99 % | HEIGHT: 63 IN | DIASTOLIC BLOOD PRESSURE: 57 MMHG | WEIGHT: 128 LBS | TEMPERATURE: 98.3 F

## 2022-06-11 LAB
ANION GAP SERPL CALCULATED.3IONS-SCNC: 10 MMOL/L (ref 4–13)
BACTERIA UR CULT: NORMAL
BASOPHILS # BLD AUTO: 0.05 THOUSANDS/ΜL (ref 0–0.1)
BASOPHILS NFR BLD AUTO: 1 % (ref 0–1)
BUN SERPL-MCNC: 33 MG/DL (ref 5–25)
CALCIUM SERPL-MCNC: 8.8 MG/DL (ref 8.3–10.1)
CHLORIDE SERPL-SCNC: 110 MMOL/L (ref 100–108)
CO2 SERPL-SCNC: 20 MMOL/L (ref 21–32)
CREAT SERPL-MCNC: 2.55 MG/DL (ref 0.6–1.3)
EOSINOPHIL # BLD AUTO: 0.41 THOUSAND/ΜL (ref 0–0.61)
EOSINOPHIL NFR BLD AUTO: 5 % (ref 0–6)
ERYTHROCYTE [DISTWIDTH] IN BLOOD BY AUTOMATED COUNT: 16.1 % (ref 11.6–15.1)
GFR SERPL CREATININE-BSD FRML MDRD: 23 ML/MIN/1.73SQ M
GLUCOSE SERPL-MCNC: 185 MG/DL (ref 65–140)
GLUCOSE SERPL-MCNC: 59 MG/DL (ref 65–140)
GLUCOSE SERPL-MCNC: 68 MG/DL (ref 65–140)
HCT VFR BLD AUTO: 28.2 % (ref 36.5–49.3)
HGB BLD-MCNC: 8.7 G/DL (ref 12–17)
IMM GRANULOCYTES # BLD AUTO: 0.03 THOUSAND/UL (ref 0–0.2)
IMM GRANULOCYTES NFR BLD AUTO: 0 % (ref 0–2)
LYMPHOCYTES # BLD AUTO: 1.47 THOUSANDS/ΜL (ref 0.6–4.47)
LYMPHOCYTES NFR BLD AUTO: 17 % (ref 14–44)
MAGNESIUM SERPL-MCNC: 2.2 MG/DL (ref 1.6–2.6)
MCH RBC QN AUTO: 24.5 PG (ref 26.8–34.3)
MCHC RBC AUTO-ENTMCNC: 30.9 G/DL (ref 31.4–37.4)
MCV RBC AUTO: 79 FL (ref 82–98)
MONOCYTES # BLD AUTO: 0.61 THOUSAND/ΜL (ref 0.17–1.22)
MONOCYTES NFR BLD AUTO: 7 % (ref 4–12)
NEUTROPHILS # BLD AUTO: 6.28 THOUSANDS/ΜL (ref 1.85–7.62)
NEUTS SEG NFR BLD AUTO: 70 % (ref 43–75)
NRBC BLD AUTO-RTO: 0 /100 WBCS
PLATELET # BLD AUTO: 153 THOUSANDS/UL (ref 149–390)
PMV BLD AUTO: 12.4 FL (ref 8.9–12.7)
POTASSIUM SERPL-SCNC: 4.7 MMOL/L (ref 3.5–5.3)
RBC # BLD AUTO: 3.55 MILLION/UL (ref 3.88–5.62)
SODIUM SERPL-SCNC: 140 MMOL/L (ref 136–145)
WBC # BLD AUTO: 8.85 THOUSAND/UL (ref 4.31–10.16)

## 2022-06-11 PROCEDURE — 99239 HOSP IP/OBS DSCHRG MGMT >30: CPT | Performed by: INTERNAL MEDICINE

## 2022-06-11 PROCEDURE — 85025 COMPLETE CBC W/AUTO DIFF WBC: CPT | Performed by: INTERNAL MEDICINE

## 2022-06-11 PROCEDURE — 83735 ASSAY OF MAGNESIUM: CPT | Performed by: INTERNAL MEDICINE

## 2022-06-11 PROCEDURE — 80048 BASIC METABOLIC PNL TOTAL CA: CPT | Performed by: INTERNAL MEDICINE

## 2022-06-11 PROCEDURE — 82948 REAGENT STRIP/BLOOD GLUCOSE: CPT

## 2022-06-11 RX ORDER — CEPHALEXIN 500 MG/1
500 CAPSULE ORAL EVERY 6 HOURS SCHEDULED
Qty: 16 CAPSULE | Refills: 0 | Status: SHIPPED | OUTPATIENT
Start: 2022-06-11 | End: 2022-06-15

## 2022-06-11 RX ADMIN — CLOPIDOGREL BISULFATE 75 MG: 75 TABLET ORAL at 08:00

## 2022-06-11 RX ADMIN — HEPARIN SODIUM 5000 UNITS: 5000 INJECTION INTRAVENOUS; SUBCUTANEOUS at 06:06

## 2022-06-11 RX ADMIN — DOCUSATE SODIUM 100 MG: 100 CAPSULE, LIQUID FILLED ORAL at 08:00

## 2022-06-11 RX ADMIN — INSULIN LISPRO 1 UNITS: 100 INJECTION, SOLUTION INTRAVENOUS; SUBCUTANEOUS at 10:31

## 2022-06-11 RX ADMIN — PREGABALIN 50 MG: 50 CAPSULE ORAL at 08:00

## 2022-06-11 RX ADMIN — TACROLIMUS 1 MG: 1 CAPSULE ORAL at 08:00

## 2022-06-11 NOTE — ASSESSMENT & PLAN NOTE
Likely, UA abnormal  CT on admission showed "Right percutaneous nephrostomy tube appears to terminate within the right renal pelvis with right hydronephrosis and a small focus of gas within the upper pole collecting system worrisome for infection unless there has been recent instrumentation"  · DC IV Ceftriaxone--urine culture negative however given improvement dc with few days of PO abx  · Appreciate urology input

## 2022-06-11 NOTE — ASSESSMENT & PLAN NOTE
Recent admission for gross hematuria, hydronephrosis of R kidney  · S/p nephrostomy tube placement in IR--no need for IR consult, working appropriately  · Appreciate urology input--cleared for discharge

## 2022-06-11 NOTE — ASSESSMENT & PLAN NOTE
Presents with generalized weakness along with chills and fevers reported at home  Suspect 2/2 ? UTI as below  · See treatment below  · PT/OT consulted--home with [facility] recommended

## 2022-06-11 NOTE — QUICK NOTE
Informed by microbiology lab 1/2 blood cultures positive for gram-positive cocci in clusters  Chart reviewed  He was discharged earlier today  He was discharged on Keflex for 3 more days  Called [place] and discussed with his spouse Go Dodd  She reports he is doing well, remains afebrile  She denies constitutional symptoms  She also reports he has a difficult IV access  Discussed blood culture results with Infectious Disease on call Dr Jose Barrera    It is now recommended by her to wait for rapid ID panel and if that is reported as Staph aureus patient will need to be called back in  If it is coagulase negative then it is likely a contaminant with no action required      The above was discussed in detail with the spouse Jerad Barboza and updated daughter Francisca Manzo at [phone]    Will keep the patient and his family updated once details of the blood cultures are available    Layton Cotton

## 2022-06-11 NOTE — CASE MANAGEMENT
Case Management Discharge Planning Note    Patient name [address] 811/PPHP [id number] MRN [id number]  : 1950 Date 2022       Current Admission Date: 2022  Current Admission Diagnosis:Generalized weakness   Patient Active Problem List    Diagnosis Date Noted    Generalized weakness 06/10/2022    [facility] 06/10/2022    UTI (urinary tract infection) 06/10/2022    Bladder mass 06/10/2022    Acute blood loss anemia 2022    Bladder tumor 2022    Gross hematuria 2022    Hydronephrosis of right kidney 2022    Mild cognitive impairment 2021    Subdural hygroma     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HCC)     CKD (chronic kidney disease) stage 4, GFR 15-29 ml/min ([facility] ) 2021    Hand lesion 2020    Type 2 diabetes mellitus with chronic kidney disease ([facility] ) 10/14/2019    Type 2 diabetes mellitus with diabetic peripheral angiopathy without gangrene, with long-term current use of insulin ([facility] ) 2019    Rash 2018    Medicare annual wellness visit, subsequent 2018    Screening for colon cancer 2018    Hepatitis B core antibody positive 2017    Acute kidney injury superimposed on CKD-4 2017    [facility] 2017    History of CVA (cerebrovascular accident) 2017    Controlled diabetes mellitus with diabetic neuropathy, with long-term current use of insulin ([facility] ) 2017    Liver transplant status ([facility] ) 2017    History of immunosuppression therapy 2017    History of hepatitis C 2017    Pruritus 2016    Spondylosis of cervical region without myelopathy or radiculopathy 2016    Headache, chronic daily 2015    Vitamin D deficiency 2014    Occipital neuralgia 2014    Migraine headache 2014    BPPV (benign paroxysmal positional vertigo), unspecified laterality 2013    Cerebral arterial aneurysm 2013  Prurigo nodularis 10/15/2013    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    Erectile dysfunction of non-organic origin 09/11/2013    History of cirrhosis 07/09/2012    Insomnia 05/30/2012    Peripheral neuropathy 05/07/2012      LOS (days): 2  Geometric Mean LOS (GMLOS) (days): 2 90  Days to GMLOS:1 5     OBJECTIVE:  Risk of Unplanned Readmission Score: 26 58         Current admission status: Inpatient   Preferred Pharmacy:   [address] [place] [address], [address] [place] [id number]  Phone: [phone] Fax: [phone]    Primary Care Provider: Gagan Larson DO    [facility]  Secondary Insurance:     DISCHARGE DETAILS:      [address]         Is the patient interested in [facility] De[facility] at discharge?: Yes  [facility] [facility] [address] requested[de-identified] Nursing, Occupational Therapy, Physical [address] Name[de-identified] Other  [address] [place] [address] Provider[de-identified] PCP  Home Health Services Needed[de-identified] Evaluate Functional Status and Safety, Gait/ADL Training, Strengthening/Theraputic Exercises to Improve Function  Homebound Criteria Met[de-identified] Requires the Assistance of Another Person for Safe Ambulation or to Leave the Home  Supporting Clincal Findings[de-identified] Limited Endurance      Treatment Team Recommendation: Home with [address] [facility]  Discharge Destination Plan[de-identified] Home with [address] [facility]        Additional Comments: CM was notified that pt is cleared for d/c home with [facility] services  Referral sent as requested

## 2022-06-11 NOTE — ASSESSMENT & PLAN NOTE
Recent admission for gross hematuria, found to have bladder mass   05/26/2022 Cystoscopy with Urology; findings include 5 cm mass, unable to stent right-sided hydronephrosis with mass blocking right ureteral orifice  · Biopsy resulted--patient had appointment outpatient 6/10 to review with urology however is now hospitalized  · Appreciate urology input--not surgical candidate  · Also appreciate oncology input given findings of "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--will arrange for outpatient chemo  · Radiation was consulted by admitting doc--will arrange for outpatient radiation  · Palliative consulted by admitting doc, f/u input--can f/u outpatient

## 2022-06-11 NOTE — ASSESSMENT & PLAN NOTE
Lab Results   Component Value Date    EGFR 23 06/11/2022    EGFR 23 06/10/2022    EGFR 22 06/09/2022    CREATININE 2 55 (H) 06/11/2022    CREATININE 2 60 (H) 06/10/2022    CREATININE 2 65 (H) 06/09/2022   · Follows with [facility]  · Creatinine baseline approx 2 9  · Monitor BMP

## 2022-06-11 NOTE — DISCHARGE SUMMARY
[address] [facility]  Discharge- [place] 1950, 74 y o  male MRN: [id number]  Unit/Bed#: [facility] 811-01 Encounter: [id number]  Primary Care Provider: Nathaly Ring DO   Date and time admitted to hospital: 6/9/2022  8:06 PM    Updated significant other Rosibel  Also left VM for daughter Carolyn Hernandez  * Generalized weakness  Assessment & Plan  Presents with generalized weakness along with chills and fevers reported at home  Suspect 2/2 ? 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--will arrange for outpatient chemo  · Radiation was consulted by admitting doc--will arrange for outpatient radiation  · Palliative consulted by admitting doc, f/u input--can f/u outpatient    UTI (urinary tract infection)  Assessment & Plan  Likely, UA abnormal  CT on admission showed "Right percutaneous nephrostomy tube appears to terminate within the right renal pelvis with right hydronephrosis and a small focus of gas within the upper pole collecting system worrisome for infection unless there has been recent instrumentation"  · DC IV Ceftriaxone--urine culture negative however given improvement dc with few days of PO abx  · Appreciate urology input     Nephrostomy status [facility])  Assessment & Plan  Recent admission for gross hematuria, hydronephrosis of R kidney  · S/p nephrostomy tube placement in IR--no need for IR consult, working appropriately  · Appreciate urology input--cleared for discharge     CKD (chronic kidney disease) stage 4, GFR 15-29 ml/min [facility])  Assessment & Plan  Lab Results   Component Value Date    EGFR 23 06/11/2022    EGFR 23 06/10/2022    EGFR 22 06/09/2022    CREATININE 2 55 (H) 06/11/2022    CREATININE 2 60 (H) 06/10/2022    CREATININE 2 65 (H) 06/09/2022   · Follows with [facility]  · Creatinine baseline approx 2 9  · Monitor BMP      Liver transplant status [facility])  Assessment & Plan  History of liver transplant in 2003, maintained on Tacrolimus managed by [facility] transplant  · Continue home dose of Prograf 1 mg q 12 hours    Controlled diabetes mellitus with diabetic neuropathy, with long-term current use of insulin [facility])  Assessment & Plan  Lab Results   Component Value Date    HGBA1C 7 2 (H) 02/10/2022       Recent Labs     06/10/22  1607 06/10/22  2047 06/11/22  0724 06/11/22  0956   POCGLU 184* 226* 68 185*       Blood Sugar Average: Last 72 hrs:  (P) 139 [id number]   · A1C reasonable   Home regimen: Levemir 18 units HS with SSI    History of CVA (cerebrovascular accident)  Assessment & Plan  Noted hx  · On plavix      Medical Problems             Resolved Problems  Date Reviewed: 6/11/2022   None               Discharging Physician / Practitioner: Deyanira Diggs PA-C  PCP: Caroline Goodrich DO  Admission Date:   Admission Orders (From admission, onward)     Ordered        06/09/22 2345  Inpatient Admission  Once                      Discharge Date: 06/11/22    Consultations During Hospital Stay:  · Urology  · Palliative care  · Radiation oncology  · Medical oncology    Procedures Performed:   · Urine culture:  Mixed contaminants  · Blood cultures:  Negative times 24 hours  · CT abdomen pelvis without contrast:  · Right percutaneous nephrostomy tube appears to terminate within the right renal pelvis with right hydronephrosis and a small focus of gas within the upper pole collecting system worrisome for infection unless there has been recent instrumentation      Correlate with urinalysis  Asymmetric right posterior bladder wall thickening may reflect treatment related changes after neoplasm resection   Difficult to entirely exclude some residual or recurrent tumor in this region in this unenhanced study  Significant Findings / Test Results:   · See above    Incidental Findings:   · See above     Test Results Pending at Discharge (will require follow up): · None     Outpatient Tests Requested:  · Follow-up oncology  · Follow-up radiation oncology  · Follow-up urology  · Follow-up PCP    Complications:  None    Reason for Admission:  Generalized weakness/fevers    Hospital Course:   Layton Grimm is a 74 y o  male patient who originally presented to the hospital on 6/9/2022 due to generalized weakness and low-grade fever at home  It was felt that he potentially had a UTI, was given antibiotics and improved  Urine culture was actually negative with mixed contaminants, however will be discharged on few days of antibiotics to complete course  In addition, his pathology from his bladder mass resulted during this hospitalization and thus urology was consulted  They felt that his nephrostomy tube was functioning well and in terms of his newly diagnosed bladder cancer, did not feel he was a surgical candidate  They recommend outpatient concurrent chemo/radiation  Oncology as well as Radiation Oncology were consulted and agreed with this  They will arrange for outpatient chemo/radiation  He was cleared by PT/OT to go home with home therapy  I spoke with all specialists on day of discharge, they are okay with discharge  Outpatient follow up for chemo/radiation to be arranged  Please see above list of diagnoses and related plan for additional information  Condition at Discharge: stable    Discharge Day Visit / Exam:   Subjective:  Feels great  Wants to go home     Vitals: Blood Pressure: 106/57 (06/11/22 0720)  Pulse: 66 (06/11/22 0720)  Temperature: 98 3 °F (36 8 °C) (06/11/22 0720)  Temp Source: Oral (06/11/22 0720)  Respirations: 16 (06/11/22 0720)  Height: 5' 3" (160 cm) (06/10/22 0101)  Weight - Scale: 58 1 kg (128 lb) (06/10/22 0101)  SpO2: 99 % (06/11/22 0720)  Exam:   Physical Exam  Vitals and nursing note reviewed  Constitutional:       General: He is not in acute distress  Cardiovascular:      Rate and Rhythm: Normal rate and regular rhythm  Pulmonary:      Effort: No respiratory distress  Abdominal:      General: There is no distension  Tenderness: There is no abdominal tenderness  Genitourinary:     Comments: Right-sided nephrostomy tube noted, mildly bloody urine  Musculoskeletal:      Right lower leg: No edema  Left lower leg: No edema  Neurological:      Mental Status: He is oriented to person, place, and time  Psychiatric:         Mood and Affect: Mood normal           Discussion with Family: left VM for daughter onel, spoke with sig other [facility]   Discharge instructions/Information to patient and family:   See after visit summary for information provided to patient and family  Provisions for Follow-Up Care:  See after visit summary for information related to follow-up care and any pertinent home health orders  Disposition:   Home with [facility] (Reminder: Complete face to face encounter)    Planned Readmission: no     Discharge Statement:  I spent 34 minutes discharging the patient  This time was spent on the day of discharge  I had direct contact with the patient on the day of discharge  Greater than 50% of the total time was spent examining patient, answering all patient questions, arranging and discussing plan of care with patient as well as directly providing post-discharge instructions  Additional time then spent on discharge activities      Discharge Medications:  See after visit summary for reconciled discharge medications provided to patient and/or family        **Please Note: This note may have been constructed using a voice recognition system**

## 2022-06-11 NOTE — ASSESSMENT & PLAN NOTE
Lab Results   Component Value Date    HGBA1C 7 2 (H) 02/10/2022       Recent Labs     06/10/22  1607 06/10/22  2047 06/11/22  0724 06/11/22  0956   POCGLU 184* 226* 68 185*       Blood Sugar Average: Last 72 hrs:  (P) 139 [id number]   · A1C reasonable   Home regimen: Levemir 18 units HS with SSI

## 2022-06-11 NOTE — DISCHARGE INSTRUCTIONS
Specialists will contact you to make follow up appointments for after discharge to discuss chemo/radiation

## 2022-06-11 NOTE — ASSESSMENT & PLAN NOTE
History of liver transplant in 2003, maintained on Tacrolimus managed by [facility] transplant  · Continue home dose of Prograf 1 mg q 12 hours

## 2022-06-12 NOTE — QUICK NOTE
Followed up on blood culture report  Is micrococcus thus likely contaminant and no other workup required  Other culture negative x 48 hours  Reviewed with ID  Called daughter Blaire Rivera reports patient is doing great, no issues  Informed her of above  All questions answered      Brayden Gabriel PA-C

## 2022-06-12 NOTE — CASE MANAGEMENT
Case Management Progress Note    Patient name Alexa Aranda  Location [address] 8[address]/PPHP 302-96 MRN [id number]  : 1950 Date 2022       LOS (days): 2  Geometric Mean LOS (GMLOS) (days): 2 90  Days to GMLOS:1 4        OBJECTIVE:        Current admission status: Inpatient  Preferred Pharmacy:   [address] [place] [id number]  Phone: [phone] Fax: [phone]    Primary Care Provider: Caroline Goodrich DO    Primary Insurance: [facility]  Secondary Insurance:     PROGRESS NOTE:  This CM covering for the w/e  Received TC from [address] [place] [address] OF [place], [facility]  who left message stating they were able to accept pt for homecare but upon calling back this CM was told they could not accept pt for non-compliance  This CM called pt's home and s/w Rosibel PLATA who indicates that patient is doing fine  Discussed sending out more referrals to other [facility] agencies  [place] referrals sent via [facility]    **addendum: pt accepted by [facility]  AVS faxed to their agency  TC to patient's home to provide name and number of agency  Messge left w/same

## 2022-06-13 ENCOUNTER — TELEPHONE (OUTPATIENT)
Dept: HEMATOLOGY ONCOLOGY | Facility: CLINIC | Age: 72
End: 2022-06-13

## 2022-06-13 ENCOUNTER — TRANSITIONAL CARE MANAGEMENT (OUTPATIENT)
Dept: FAMILY MEDICINE CLINIC | Facility: CLINIC | Age: 72
End: 2022-06-13

## 2022-06-13 ENCOUNTER — OFFICE VISIT (OUTPATIENT)
Dept: NEPHROLOGY | Facility: CLINIC | Age: 72
End: 2022-06-13
Payer: COMMERCIAL

## 2022-06-13 ENCOUNTER — TELEPHONE (OUTPATIENT)
Dept: INTERNAL MEDICINE CLINIC | Facility: CLINIC | Age: 72
End: 2022-06-13

## 2022-06-13 VITALS
WEIGHT: 125 LBS | DIASTOLIC BLOOD PRESSURE: 68 MMHG | SYSTOLIC BLOOD PRESSURE: 122 MMHG | HEIGHT: 63 IN | BODY MASS INDEX: 22.15 KG/M2 | HEART RATE: 80 BPM

## 2022-06-13 DIAGNOSIS — N18.9 CHRONIC KIDNEY DISEASE, UNSPECIFIED CKD STAGE: Primary | ICD-10-CM

## 2022-06-13 DIAGNOSIS — N18.4 STAGE 4 CHRONIC KIDNEY DISEASE (HCC): ICD-10-CM

## 2022-06-13 PROBLEM — N17.9 ACUTE KIDNEY INJURY SUPERIMPOSED ON CKD: Status: RESOLVED | Noted: 2017-09-20 | Resolved: 2022-06-13

## 2022-06-13 PROBLEM — N17.9 ACUTE KIDNEY INJURY SUPERIMPOSED ON CKD (HCC): Status: RESOLVED | Noted: 2017-09-20 | Resolved: 2022-06-13

## 2022-06-13 LAB
ALL TARGETS: NOT DETECTED
BACTERIA BLD CULT: ABNORMAL
GRAM STN SPEC: ABNORMAL

## 2022-06-13 PROCEDURE — 1111F DSCHRG MED/CURRENT MED MERGE: CPT | Performed by: INTERNAL MEDICINE

## 2022-06-13 PROCEDURE — 99213 OFFICE O/P EST LOW 20 MIN: CPT | Performed by: INTERNAL MEDICINE

## 2022-06-13 NOTE — UTILIZATION REVIEW
Notification of Discharge   This is a Notification of Discharge from our facility [address]  Please be advised that this patient has been discharge from our facility  Below you will find the admission and discharge date and time including the patients disposition  UTILIZATION REVIEW CONTACT:  Perla Adamson  Utilization   Network Utilization Review Department  Phone: [phone] x carefully listen to the prompts  All voicemails are confidential   Email: [email or web address]     PHYSICIAN ADVISORY SERVICES:  FOR [facility] REVIEW - MEDICAL NECESSITY DENIAL  Phone: [phone]  Fax: [phone]  Email: [email or web address]     PRESENTATION DATE: 6/9/2022  8:06 PM  OBERVATION ADMISSION DATE:   INPATIENT ADMISSION DATE: 6/9/22 11:45 PM   DISCHARGE DATE: 6/11/2022 12:40 PM  DISPOSITION: Home with [place] with [address] [place] [address] Road INFORMATION:  Send all requests for admission clinical reviews, approved or denied determinations and any other requests to dedicated fax number below belonging to the campus where the patient is receiving treatment   List of dedicated fax numbers:  [address] DENIALS (Administrative/Medical Necessity) [phone]   [address] (Maternity/NICU/Pediatrics) [phone]   [place] Notice [phone]   [address] [phone]   [address] [place] [address] [phone]   [address] [place] [address],[address] [phone]   [facility]  [phone]   [address] [place] [address], [facility]  [address] [facility] [address] [facility] [phone]

## 2022-06-13 NOTE — LETTER
June 13, 2022     Irene Gill, [address] [facility]  [address] [address],[address]  [address] [address]    Patient: Aster Galvez   YOB: 1950   Date of Visit: 6/13/2022       Dear Dr Andres Owen:    Thank you for referring Aster Galvez to me for evaluation  Below are my notes for this consultation  If you have questions, please do not hesitate to call me  I look forward to following your patient along with you  Sincerely,        Adriana Webb MD        CC: MD Adriana Smith MD  6/13/2022  1:08 PM  Sign when Signing Visit  NEPHROLOGY PROGRESS NOTE    Aster Galvez 74 y o  male MRN: [id number]  Unit/Bed#:  Encounter: [id number]  Reason for Consult:  Chronic kidney disease    Patient is here for routine follow-up  Unfortunately he was admitted to the hospital after bouts of gross hematuria was found to have right-sided hydronephrosis with a bladder cancer  He underwent right-sided nephrostomy tube placement biopsy showing bladder cancer and is here for hospital follow-up  No specific complaints today  Nephrostomy tube draining well with no pain reported  ASSESSMENT/PLAN:  1  Renal    Patient's chronic kidney disease felt likely due to tacrolimus toxicity  Creatinine had been running in the low 2s most recent 1 from his hospitalization was 2 5  He presented with a higher creatinine but had obstruction of his right kidney after restriction was relieved creatinine was improving towards his baseline  Will continue with current medications I have given him a slip to repeat blood work in a few weeks to make sure things are stable  Blood pressure is excellent no changes in medications  2  Bladder cancer    Patient was diagnosed with bladder cancer  He underwent biopsy and required nephrostomy tube placement for obstruction  They informed me that they had just seen Urology in the hospital were waiting for an office visit to discuss best treatment options    I told him I would give the urologist to call to discuss this to see whether not surgery is an option or if it is only medical therapy  SUBJECTIVE:  Review of Systems   Constitutional: Positive for weight loss  Negative for chills, fever, malaise/fatigue and night sweats  HENT: Negative  Eyes: Negative  Cardiovascular: Negative  Negative for chest pain, dyspnea on exertion, leg swelling and orthopnea  Respiratory: Negative  Negative for cough, shortness of breath, sputum production and wheezing  Gastrointestinal: Negative  Negative for abdominal pain, diarrhea, nausea and vomiting  Genitourinary: Negative for dysuria, hematuria and incomplete emptying  Right nephrostomy tube present  Neurological: Negative for dizziness, focal weakness, headaches and weakness  Psychiatric/Behavioral: Negative for altered mental status, depression, hallucinations and hypervigilance  OBJECTIVE:  Current Weight: Weight - Scale: 56 7 kg (125 lb)  [email or web address]     Blood pressure 122/68, pulse 80, height 5' 3" (1 6 m), weight 56 7 kg (125 lb)  , Body mass index is 22 14 kg/m²  [unfilled]    Physical Exam: /68 (BP Location: Left arm, Patient Position: Sitting, Cuff Size: Standard)   Pulse 80   Ht 5' 3" (1 6 m)   Wt 56 7 kg (125 lb)   BMI 22 14 kg/m²   Physical Exam  Constitutional:       General: He is not in acute distress  Appearance: He is not toxic-appearing or diaphoretic  HENT:      Head: Normocephalic and atraumatic  Mouth/Throat:      Mouth: Mucous membranes are moist    Eyes:      General: No scleral icterus  Extraocular Movements: Extraocular movements intact  Cardiovascular:      Rate and Rhythm: Normal rate and regular rhythm  Heart sounds: No friction rub  No gallop  Comments: No edema  Pulmonary:      Effort: Pulmonary effort is normal  No respiratory distress  Breath sounds: Normal breath sounds  No wheezing, rhonchi or rales     Abdominal:      General: Bowel sounds are normal  There is no distension  Palpations: Abdomen is soft  Tenderness: There is no abdominal tenderness  There is no rebound  Musculoskeletal:      Cervical back: Normal range of motion and neck supple  Neurological:      General: No focal deficit present  Mental Status: He is alert and oriented to person, place, and time  Mental status is at baseline  Psychiatric:         Mood and Affect: Mood normal          Behavior: Behavior normal          Thought Content:  Thought content normal          Judgment: Judgment normal          Medications:    Current Outpatient Medications:     acetaminophen (TYLENOL) 500 mg tablet, Take 1 tablet (500 mg total) by mouth every 6 (six) hours as needed for mild pain, Disp: 30 tablet, Rfl: 0    cephalexin (KEFLEX) 500 mg capsule, Take 1 capsule (500 mg total) by mouth every 6 (six) hours for 4 days, Disp: 16 capsule, Rfl: 0    clopidogrel (PLAVIX) 75 mg tablet, Take 1 tablet (75 mg total) by mouth daily, Disp: 90 tablet, Rfl: 3    Comfort EZ Pen Needles 32G X 4 MM MISC, USE 3-4 AS DIRECTED, Disp: 100 each, Rfl: 5    Continuous Blood Gluc  (FreeStyle Frank 14 Day [place]) NATALIA, Use 1 Device continuous, Disp: 1 each, Rfl: 0    Continuous Blood Gluc Sensor (FreeStyle Frank 14 Day Sensor) MISC, Use 1 Device 4 (four) times a day, Disp: 1 each, Rfl: 5    Incontinence Supply Disposable (Incontinence Brief Medium) MISC, Use 4 (four) times a day, Disp: 120 each, Rfl: 5    insulin detemir (Levemir FlexTouch) 100 Units/mL injection pen, Inject 18 Units under the skin daily at bedtime, Disp: 15 mL, Rfl: 0    INSULIN SYRINGE 1CC/29G 29G X 1/2" 1 ML MISC, by Does not apply route, Disp: , Rfl:     Lancet Devices (Lancing Device) MISC, USE AS DIRECTED, Disp: 1 each, Rfl: 0    Lancets MISC, by Does not apply route, Disp: , Rfl:     OneTouch Ultra test strip, TEST UP TO 3 TIMES A DAY, Disp: 100 each, Rfl: 0    pregabalin (LYRICA) 50 mg capsule, TAKE 1 CAPSULE (50 MG TOTAL) BY MOUTH 3 (THREE) TIMES A DAY, Disp: 90 capsule, Rfl: 5    rosuvastatin (CRESTOR) 40 MG tablet, TAKE ONE (1) TABLET BY MOUTH DAILY, Disp: 30 tablet, Rfl: 5    sodium chloride, PF, 0 9 %, 10 mL by Intracatheter route daily Intracatheter flushing daily   May substitute prefilled syringe with normal saline 10 mL vials, 10 mL syringes, and 18 g blunt needles, Disp: 300 mL, Rfl: 2    tacrolimus (PROGRAF) 1 mg capsule, Take 1 capsule (1 mg total) by mouth every 12 (twelve) hours, Disp: 180 capsule, Rfl: 3    temazepam (RESTORIL) 30 mg capsule, TAKE 1 CAPSULE (30 MG TOTAL) BY MOUTH DAILY AT BEDTIME, Disp: 30 capsule, Rfl: 5    zolpidem (AMBIEN) 10 mg tablet, Take 1 tablet (10 mg total) by mouth daily at bedtime, Disp: 30 tablet, Rfl: 5    Laboratory Results:  Lab Results   Component Value Date    WBC 8 85 06/11/2022    HGB 8 7 (L) 06/11/2022    HCT 28 2 (L) 06/11/2022    MCV 79 (L) 06/11/2022     06/11/2022     Lab Results   Component Value Date    SODIUM 140 06/11/2022    K 4 7 06/11/2022     (H) 06/11/2022    CO2 20 (L) 06/11/2022    BUN 33 (H) 06/11/2022    CREATININE 2 55 (H) 06/11/2022    GLUC 59 (L) 06/11/2022    CALCIUM 8 8 06/11/2022     Lab Results   Component Value Date    CALCIUM 8 8 06/11/2022    PHOS 3 8 02/10/2022     No results found for: LABPROT

## 2022-06-13 NOTE — PROGRESS NOTES
NEPHROLOGY PROGRESS NOTE    Attila Minor 74 y o  male MRN: [id number]  Unit/Bed#:  Encounter: [id number]  Reason for Consult:  Chronic kidney disease    Patient is here for routine follow-up  Unfortunately he was admitted to the hospital after bouts of gross hematuria was found to have right-sided hydronephrosis with a bladder cancer  He underwent right-sided nephrostomy tube placement biopsy showing bladder cancer and is here for hospital follow-up  No specific complaints today  Nephrostomy tube draining well with no pain reported  ASSESSMENT/PLAN:  1  Renal    Patient's chronic kidney disease felt likely due to tacrolimus toxicity  Creatinine had been running in the low 2s most recent 1 from his hospitalization was 2 5  He presented with a higher creatinine but had obstruction of his right kidney after restriction was relieved creatinine was improving towards his baseline  Will continue with current medications I have given him a slip to repeat blood work in a few weeks to make sure things are stable  Blood pressure is excellent no changes in medications  2  Bladder cancer    Patient was diagnosed with bladder cancer  He underwent biopsy and required nephrostomy tube placement for obstruction  They informed me that they had just seen Urology in the hospital were waiting for an office visit to discuss best treatment options  I told him I would give the urologist to call to discuss this to see whether not surgery is an option or if it is only medical therapy  SUBJECTIVE:  Review of Systems   Constitutional: Positive for weight loss  Negative for chills, fever, malaise/fatigue and night sweats  HENT: Negative  Eyes: Negative  Cardiovascular: Negative  Negative for chest pain, dyspnea on exertion, leg swelling and orthopnea  Respiratory: Negative  Negative for cough, shortness of breath, sputum production and wheezing  Gastrointestinal: Negative    Negative for abdominal pain, diarrhea, nausea and vomiting  Genitourinary: Negative for dysuria, hematuria and incomplete emptying  Right nephrostomy tube present  Neurological: Negative for dizziness, focal weakness, headaches and weakness  Psychiatric/Behavioral: Negative for altered mental status, depression, hallucinations and hypervigilance  OBJECTIVE:  Current Weight: Weight - Scale: 56 7 kg (125 lb)  [email or web address] com:     Blood pressure 122/68, pulse 80, height 5' 3" (1 6 m), weight 56 7 kg (125 lb)  , Body mass index is 22 14 kg/m²  [unfilled]    Physical Exam: /68 (BP Location: Left arm, Patient Position: Sitting, Cuff Size: Standard)   Pulse 80   Ht 5' 3" (1 6 m)   Wt 56 7 kg (125 lb)   BMI 22 14 kg/m²   Physical Exam  Constitutional:       General: He is not in acute distress  Appearance: He is not toxic-appearing or diaphoretic  HENT:      Head: Normocephalic and atraumatic  Mouth/Throat:      Mouth: Mucous membranes are moist    Eyes:      General: No scleral icterus  Extraocular Movements: Extraocular movements intact  Cardiovascular:      Rate and Rhythm: Normal rate and regular rhythm  Heart sounds: No friction rub  No gallop  Comments: No edema  Pulmonary:      Effort: Pulmonary effort is normal  No respiratory distress  Breath sounds: Normal breath sounds  No wheezing, rhonchi or rales  Abdominal:      General: Bowel sounds are normal  There is no distension  Palpations: Abdomen is soft  Tenderness: There is no abdominal tenderness  There is no rebound  Musculoskeletal:      Cervical back: Normal range of motion and neck supple  Neurological:      General: No focal deficit present  Mental Status: He is alert and oriented to person, place, and time  Mental status is at baseline  Psychiatric:         Mood and Affect: Mood normal          Behavior: Behavior normal          Thought Content:  Thought content normal          Judgment: Judgment normal          Medications:    Current Outpatient Medications:     acetaminophen (TYLENOL) 500 mg tablet, Take 1 tablet (500 mg total) by mouth every 6 (six) hours as needed for mild pain, Disp: 30 tablet, Rfl: 0    cephalexin (KEFLEX) 500 mg capsule, Take 1 capsule (500 mg total) by mouth every 6 (six) hours for 4 days, Disp: 16 capsule, Rfl: 0    clopidogrel (PLAVIX) 75 mg tablet, Take 1 tablet (75 mg total) by mouth daily, Disp: 90 tablet, Rfl: 3    Comfort EZ Pen Needles 32G X 4 MM MISC, USE 3-4 AS DIRECTED, Disp: 100 each, Rfl: 5    Continuous Blood Gluc  (FreeStyle Frank 14 Day [place]) NATALIA, Use 1 Device continuous, Disp: 1 each, Rfl: 0    Continuous Blood Gluc Sensor (FreeStyle Frank 14 Day Sensor) MISC, Use 1 Device 4 (four) times a day, Disp: 1 each, Rfl: 5    Incontinence Supply Disposable (Incontinence Brief Medium) MISC, Use 4 (four) times a day, Disp: 120 each, Rfl: 5    insulin detemir (Levemir FlexTouch) 100 Units/mL injection pen, Inject 18 Units under the skin daily at bedtime, Disp: 15 mL, Rfl: 0    INSULIN SYRINGE 1CC/29G 29G X 1/2" 1 ML MISC, by Does not apply route, Disp: , Rfl:     Lancet Devices (Lancing Device) MISC, USE AS DIRECTED, Disp: 1 each, Rfl: 0    Lancets MISC, by Does not apply route, Disp: , Rfl:     OneTouch Ultra test strip, TEST UP TO 3 TIMES A DAY, Disp: 100 each, Rfl: 0    pregabalin (LYRICA) 50 mg capsule, TAKE 1 CAPSULE (50 MG TOTAL) BY MOUTH 3 (THREE) TIMES A DAY, Disp: 90 capsule, Rfl: 5    rosuvastatin (CRESTOR) 40 MG tablet, TAKE ONE (1) TABLET BY MOUTH DAILY, Disp: 30 tablet, Rfl: 5    sodium chloride, PF, 0 9 %, 10 mL by Intracatheter route daily Intracatheter flushing daily   May substitute prefilled syringe with normal saline 10 mL vials, 10 mL syringes, and 18 g blunt needles, Disp: 300 mL, Rfl: 2    tacrolimus (PROGRAF) 1 mg capsule, Take 1 capsule (1 mg total) by mouth every 12 (twelve) hours, Disp: 180 capsule, Rfl: 3   temazepam (RESTORIL) 30 mg capsule, TAKE 1 CAPSULE (30 MG TOTAL) BY MOUTH DAILY AT BEDTIME, Disp: 30 capsule, Rfl: 5    zolpidem (AMBIEN) 10 mg tablet, Take 1 tablet (10 mg total) by mouth daily at bedtime, Disp: 30 tablet, Rfl: 5    Laboratory Results:  Lab Results   Component Value Date    WBC 8 85 06/11/2022    HGB 8 7 (L) 06/11/2022    HCT 28 2 (L) 06/11/2022    MCV 79 (L) 06/11/2022     06/11/2022     Lab Results   Component Value Date    SODIUM 140 06/11/2022    K 4 7 06/11/2022     (H) 06/11/2022    CO2 20 (L) 06/11/2022    BUN 33 (H) 06/11/2022    CREATININE 2 55 (H) 06/11/2022    GLUC 59 (L) 06/11/2022    CALCIUM 8 8 06/11/2022     Lab Results   Component Value Date    CALCIUM 8 8 06/11/2022    PHOS 3 8 02/10/2022     No results found for: LABPROT

## 2022-06-13 NOTE — TELEPHONE ENCOUNTER
Spoke with Mihaela Castrejon and confirmed appt, at 11:20 at 6/15 with Dr Anahi Stallworth in the [facility]

## 2022-06-13 NOTE — TELEPHONE ENCOUNTER
Called and spoke to [employer]  Pt does not need a prior auth for Temazepam  They are refilling it for him today    [facility]

## 2022-06-13 NOTE — PATIENT INSTRUCTIONS
You are here for follow-up  You did tell me you recently were found to have a bladder tumor after you presented with peeing blood  They had to put a tube into the right kidney to help drain  You are going to be seeing Urology to help define the best treatment option for your bladder tumor  With respect your kidney function the creatinine levels 2 5 which is stable around her baseline  Continue with your current medications blood pressure is excellent  We will continue to monitor labs  I am going to contact the urologist to discuss her case with them and then I will get back to about what the best recommendations are

## 2022-06-14 ENCOUNTER — TELEPHONE (OUTPATIENT)
Dept: OTHER | Facility: OTHER | Age: 72
End: 2022-06-14

## 2022-06-14 ENCOUNTER — DOCUMENTATION (OUTPATIENT)
Dept: HEMATOLOGY ONCOLOGY | Facility: CLINIC | Age: 72
End: 2022-06-14

## 2022-06-14 ENCOUNTER — PATIENT OUTREACH (OUTPATIENT)
Dept: HEMATOLOGY ONCOLOGY | Facility: CLINIC | Age: 72
End: 2022-06-14

## 2022-06-14 DIAGNOSIS — C68.9 UROTHELIAL CARCINOMA (HCC): Primary | ICD-10-CM

## 2022-06-14 NOTE — PROGRESS NOTES
Pathology completed: 5/26/22  Imaging completed:6/9/22  All records needed are in patients chart  No further actions needed at this time       Message sent to Med Onc nurse

## 2022-06-14 NOTE — TELEPHONE ENCOUNTER
Called and spoke with Carlton Fothergill  And notified her that Patient needs to do routine PCN flushing  This should be performed everyday with 10 ml NSS   When she is at home if she has questions she is to call    The family is aware that jason has an appointment  Next week with Dr Inga Dillon

## 2022-06-14 NOTE — TELEPHONE ENCOUNTER
Visiting nurse calling in stating she did her initial visit with patient today and would like to know if the patient should be getting his nephrostomy tube flushed as there are no current orders and patient now has a [employer] lock tube  She states that previously the home health nurse had flushed the tube and the patient was suppose to be flushing it again but has not been doing it  She would also like to make the provider aware that there is very little urine in the bag and some hematuria is noted  She encouraged the patient to drink more fluids  He is currently afebrile  Please follow up regarding orders and concerns

## 2022-06-14 NOTE — PROGRESS NOTES
# [id number]    Left message for pt with  introducing myself and role in his care  Stated I am here to assist him in any way that I can and requested call back  Contact info provided

## 2022-06-14 NOTE — TELEPHONE ENCOUNTER
Pathology completed: 5/26/22  Imaging completed:6/9/22  All records needed are in patients chart  No further actions needed at this time

## 2022-06-15 ENCOUNTER — TELEPHONE (OUTPATIENT)
Dept: HEMATOLOGY ONCOLOGY | Facility: CLINIC | Age: 72
End: 2022-06-15

## 2022-06-15 ENCOUNTER — OFFICE VISIT (OUTPATIENT)
Dept: HEMATOLOGY ONCOLOGY | Facility: CLINIC | Age: 72
End: 2022-06-15
Payer: COMMERCIAL

## 2022-06-15 VITALS
BODY MASS INDEX: 22.06 KG/M2 | TEMPERATURE: 97.2 F | OXYGEN SATURATION: 97 % | WEIGHT: 124.5 LBS | DIASTOLIC BLOOD PRESSURE: 70 MMHG | RESPIRATION RATE: 17 BRPM | HEART RATE: 79 BPM | SYSTOLIC BLOOD PRESSURE: 130 MMHG | HEIGHT: 63 IN

## 2022-06-15 DIAGNOSIS — C67.0 MALIGNANT NEOPLASM OF TRIGONE OF URINARY BLADDER (HCC): Primary | ICD-10-CM

## 2022-06-15 DIAGNOSIS — R31.0 GROSS HEMATURIA: ICD-10-CM

## 2022-06-15 LAB — BACTERIA BLD CULT: NORMAL

## 2022-06-15 PROCEDURE — 99205 OFFICE O/P NEW HI 60 MIN: CPT | Performed by: INTERNAL MEDICINE

## 2022-06-15 PROCEDURE — 1036F TOBACCO NON-USER: CPT | Performed by: INTERNAL MEDICINE

## 2022-06-15 PROCEDURE — 3008F BODY MASS INDEX DOCD: CPT | Performed by: INTERNAL MEDICINE

## 2022-06-15 PROCEDURE — 1160F RVW MEDS BY RX/DR IN RCRD: CPT | Performed by: INTERNAL MEDICINE

## 2022-06-15 NOTE — PROGRESS NOTES
ONCOLOGY CHECKLIST     ONCOLOGY TREATMENT     Task Priority Due Responsible Completed Completed By Step Outcome    Discussed role of Hopeline    6/15/2022 Juana Stephenson RN          Orders placed for pre-treatment labs    6/15/2022 Juana Stephenson RN          Perform IV assessment, review PORT procedure    6/15/2022 Jauna Stephenson RN          Review schedule / calendar    6/15/2022 Juana Stephenson RN          Reviewed common side-effects    6/15/2022 Juana Stephenson RN          Reviewed if transportation assistance is needed    6/15/2022 Juana Stephenson RN          Reviewed medical oncology nurse contact information    6/15/2022 Juana Stephenson RN          Reviewed regimen specific education sheets    6/15/2022 Juana Stephenson RN          Reviewed use of supportive medication    6/15/2022 Juana Stephenson RN          Reviewed what to expect the first day of treatment    6/15/2022 Juana Stephenson RN          Reviewed when blood work needs to be completed (initial and subsequent draws)    6/15/2022 Juana Stephenson RN          Reviewed when to call (temp greater than 100 4, uncontrolled vomiting, diarrhea, etc )    6/15/2022 Juana Stephenson RN          Scripts for support medication sent to pharmacy    6/15/2022 Juana Stephenson RN

## 2022-06-15 NOTE — TELEPHONE ENCOUNTER
Spoke to [employer] supply his insurance does not cover Incontinence supplies  There is a company he can call called [employer] [phone]  I will let the patient know

## 2022-06-15 NOTE — TELEPHONE ENCOUNTER
Dr Pricila Fisher patient to start chemotherapy Gemzar along with low dose radiation  Does Urology need to see patient?   Thanks

## 2022-06-15 NOTE — TELEPHONE ENCOUNTER
Good afternoon patient needs to be scheduled for GEMZAR  Patient is requesting to either do Tuesday or Thursday in the afternoon  Thank you!

## 2022-06-15 NOTE — PROGRESS NOTES
Oncology Consult Note  Marlena Garcia 74 y o  male MRN: [id number]  Unit/Bed#:  Encounter: [id number]      Presenting Complaint:  Muscle invasive bladder cancer    History of Presenting Illness:  77-year-old  male with complicated medical history including history of hepatitis-C, cirrhosis of the liver with subsequent liver transplant in 2010, diabetes mellitus type 2, diabetic neuropathy, benign paroxysmal positional vertigo, subdural hygroma, chronic kidney disease grade 3-4, history of previous alcoholism, presented with gross hematuria on May 2022 subsequently CT scan of the abdomen and pelvis showed moderate right-sided hydroureteronephrosis with ureteral calculus seen, bladder mass measuring 7 4 x 6 6 cm was possible soft tissue extension into the right ureterovesical junction  Status post cystoscopy with 5 cm mass involving the right side as well as the trigone unable to stand the right-sided hydronephrosis status post nephrostomy tube biopsy showed 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, he was not candidate for surgical resection because of comorbidities, liver transplant as well as advanced age     The patient felt to be candidate for concurrent radiation with low-dose gemcitabine        Review of Systems - As stated in the HPI otherwise the fourteen point review of systems was negative      Past Medical History:   Diagnosis Date    Alcoholism [facility])     Cerebral artery aneurysm     Change in mental state     last assessed 5/18/15; resolved 10/27/15    Diabetes mellitus ([address] )     Drug dependence ([facility] [address] )     Fatigue     last assessed 1/26/15; resolved 5/24/16    Hepatitis [facility] discharge follow-up 12/21/2018    Kidney disease     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[address] )     Liver transplant recipient [facility])     Renal disorder     Subdural hygroma     2/27/14; resolved 7/28/15    Thrombocytopenia ([address] ) 9/20/2017       Social History     Socioeconomic History    Marital status: /Civil Union     Spouse name: None    Number of children: None    Years of education: less then high school    Highest education level: None   Occupational History    Occupation: physical disability    Tobacco Use    Smoking status: Never Smoker    Smokeless tobacco: Never Used    Tobacco comment: former smoker per allscripts    Vaping Use    Vaping Use: Never used   Substance and Sexual Activity    Alcohol use: Not Currently    Drug use: No     Comment: remotely quit drug use per allscripts     Sexual activity: Never   Other Topics Concern    None   Social History Narrative    Caffeine use     Living with significant other , girlfriend and daughter      Social Determinants of Health     Financial Resource Strain: Not on file   Food Insecurity: No Food Insecurity    Worried About [address] [employer] in the Last Year: Never true    [address] [employer] in the Last Year: Never true   Transportation Needs: Unknown    Lack of Transportation (Medical): Not on file    Lack of Transportation (Non-Medical):  No   Physical Activity: Not on file   Stress: Not on file   Social Connections: Not on file   Intimate Partner Violence: Not on file   Housing Stability: Low Risk     Unable to Pay for Housing in the Last Year: No    Number of Places Lived in the Last Year: 1    Unstable Housing in the Last Year: No       Family History   Problem Relation Age of Onset    Hypertension Mother         benign essential     Lung cancer Father     Diabetes Sister     Cancer Brother     Stroke Other         cva - due to embolism of cerebra artery        Allergies   Allergen Reactions    Tequin [Gatifloxacin] Rash    Ciprofloxacin     Iv Contrast [Iodinated Diagnostic Agents]     Levofloxacin Rash    Lipitor [Atorvastatin] Rash     Rash and itching    Omnipaque [Iohexol]          Current Outpatient Medications:     acetaminophen (TYLENOL) 500 mg tablet, Take 1 tablet (500 mg total) by mouth every 6 (six) hours as needed for mild pain, Disp: 30 tablet, Rfl: 0    cephalexin (KEFLEX) 500 mg capsule, Take 1 capsule (500 mg total) by mouth every 6 (six) hours for 4 days, Disp: 16 capsule, Rfl: 0    clopidogrel (PLAVIX) 75 mg tablet, Take 1 tablet (75 mg total) by mouth daily, Disp: 90 tablet, Rfl: 3    Comfort EZ Pen Needles 32G X 4 MM MISC, USE 3-4 AS DIRECTED, Disp: 100 each, Rfl: 5    Continuous Blood Gluc  (FreeStyle Frank 14 Day [place]) NATALIA, Use 1 Device continuous, Disp: 1 each, Rfl: 0    Continuous Blood Gluc Sensor (FreeStyle Frank 14 Day Sensor) MISC, Use 1 Device 4 (four) times a day, Disp: 1 each, Rfl: 5    Incontinence Supply Disposable (Incontinence Brief Medium) MISC, Use 4 (four) times a day, Disp: 120 each, Rfl: 5    insulin detemir (Levemir FlexTouch) 100 Units/mL injection pen, Inject 18 Units under the skin daily at bedtime, Disp: 15 mL, Rfl: 0    INSULIN SYRINGE 1CC/29G 29G X 1/2" 1 ML MISC, by Does not apply route, Disp: , Rfl:     Lancet Devices (Lancing Device) MISC, USE AS DIRECTED, Disp: 1 each, Rfl: 0    Lancets MISC, by Does not apply route, Disp: , Rfl:     OneTouch Ultra test strip, TEST UP TO 3 TIMES A DAY, Disp: 100 each, Rfl: 0    pregabalin (LYRICA) 50 mg capsule, TAKE 1 CAPSULE (50 MG TOTAL) BY MOUTH 3 (THREE) TIMES A DAY, Disp: 90 capsule, Rfl: 5    rosuvastatin (CRESTOR) 40 MG tablet, TAKE ONE (1) TABLET BY MOUTH DAILY, Disp: 30 tablet, Rfl: 5    sodium chloride, PF, 0 9 %, 10 mL by Intracatheter route daily Intracatheter flushing daily   May substitute prefilled syringe with normal saline 10 mL vials, 10 mL syringes, and 18 g blunt needles, Disp: 300 mL, Rfl: 2    tacrolimus (PROGRAF) 1 mg capsule, Take 1 capsule (1 mg total) by mouth every 12 (twelve) hours, Disp: 180 capsule, Rfl: 3    temazepam (RESTORIL) 30 mg capsule, TAKE 1 CAPSULE (30 MG TOTAL) BY MOUTH DAILY AT BEDTIME, Disp: 30 capsule, Rfl: 5    zolpidem (AMBIEN) 10 mg tablet, Take 1 tablet (10 mg total) by mouth daily at bedtime, Disp: 30 tablet, Rfl: 5      /70 (BP Location: Left arm, Patient Position: Sitting, Cuff Size: Adult)   Pulse 79   Temp (!) 97 2 °F (36 2 °C)   Resp 17   Ht 5' 3" (1 6 m)   Wt 56 5 kg (124 lb 8 oz)   SpO2 97%   BMI 22 05 kg/m²       General Appearance:    Alert, oriented chronically ill        Eyes:    PERRL   Ears:    Normal external ear canals, both ears   Nose:   Nares normal, septum midline   Throat:   Mucosa moist  Pharynx without injection  Neck:   Supple       Lungs:     Clear to auscultation bilaterally   Chest Wall:    No tenderness or deformity    Heart:    Regular rate and rhythm       Abdomen:     Soft, non-tender, bowel sounds +, no organomegaly           Extremities:   Extremities no cyanosis, +1-2  edema       Skin:   no rash or icterus  Lymph nodes:   Cervical, supraclavicular, and axillary nodes normal   Neurologic:   CNII-XII intact, normal strength, sensation and reflexes     Throughout               No results found for this or any previous visit (from the past 48 hour(s))  CT abdomen pelvis wo contrast    Result Date: 6/9/2022  Narrative: CT ABDOMEN AND PELVIS WITHOUT IV CONTRAST INDICATION:   Nephrostomy catheter displacement Fever, no perc neprhostomy output x 3days  COMPARISON:  5/23/2022  TECHNIQUE:  CT examination of the abdomen and pelvis was performed without intravenous contrast  This examination was performed without intravenous contrast in the context of the critical nationwide Omnipaque shortage  Axial, sagittal, and coronal 2D reformatted images were created from the source data and submitted for interpretation  Radiation dose length product (DLP) for this visit:  401 mGy-cm   This examination, like all CT scans performed in the [facility], was performed utilizing techniques to minimize radiation dose exposure, including the use of iterative reconstruction and automated exposure control  Enteric contrast was administered  FINDINGS: ABDOMEN LOWER CHEST:  No clinically significant abnormality identified in the visualized lower chest  LIVER/BILIARY TREE:  Unremarkable  GALLBLADDER:  No calcified gallstones  No pericholecystic inflammatory change  SPLEEN:  Unremarkable  PANCREAS:  Unremarkable  ADRENAL GLANDS:  Unremarkable  KIDNEYS/URETERS:  Right percutaneous nephrostomy tube noted and appears to terminate within the renal pelvis  There is mild right hydronephrosis with a small focus of gas within the upper pole collecting system  Infection is of concern  The left kidney is unremarkable  STOMACH AND BOWEL:  Unremarkable  APPENDIX:  A normal appendix was visualized  ABDOMINOPELVIC CAVITY:  No ascites  No pneumoperitoneum  No lymphadenopathy  VESSELS:  Unremarkable for patient's age  PELVIS REPRODUCTIVE ORGANS:  Prostatic calcifications noted  URINARY BLADDER:  Asymmetric thickening right posterior aspect of the bladder likely reflects some residual tumor  ABDOMINAL WALL/INGUINAL REGIONS:  Unremarkable  OSSEOUS STRUCTURES:  No acute fracture or destructive osseous lesion  Impression: Right percutaneous nephrostomy tube appears to terminate within the right renal pelvis with right hydronephrosis and a small focus of gas within the upper pole collecting system worrisome for infection unless there has been recent instrumentation  Correlate with urinalysis  Asymmetric right posterior bladder wall thickening may reflect treatment related changes after neoplasm resection  Difficult to entirely exclude some residual or recurrent tumor in this region in this unenhanced study  Workstation performed: [id number]     CT abdomen pelvis wo contrast    Result Date: 5/23/2022  Narrative: CT ABDOMEN AND PELVIS WITHOUT IV CONTRAST INDICATION:   Hematuria  COMPARISON:  5/23/2021   TECHNIQUE:  CT examination of the abdomen and pelvis was performed without intravenous contrast  This examination was performed without intravenous contrast in the context of the critical nationwide Omnipaque shortage  Axial, sagittal, and coronal 2D reformatted images were created from the source data and submitted for interpretation  Radiation dose length product (DLP) for this visit:  334 55 mGy-cm   This examination, like all CT scans performed in the [facility], was performed utilizing techniques to minimize radiation dose exposure, including the use of iterative  reconstruction and automated exposure control  Enteric contrast was not administered  FINDINGS: ABDOMEN LOWER CHEST:  There is linear atelectasis in the lingula and left lower lobes  Minimal dependent atelectasis noted in the lower lobes  LIVER/BILIARY TREE:  Unremarkable  GALLBLADDER:  Status post cholecystectomy  SPLEEN:  Unremarkable  PANCREAS:  Atrophic as before  ADRENAL GLANDS:  Unremarkable  KIDNEYS/URETERS:  There is persistent moderate right-sided hydroureteronephrosis without a discrete obstructing ureteral calculus seen  There is however a large bladder mass which measures approximately 7 4 x 6 6 cm  Differential considerations would include a large hematoma and/or neoplasm  There is possible soft tissue mass extending into the right ureterovesical junction  Previously there was hydronephrosis as well with suspected mild bladder wall thickening along the right posterior bladder wall  No hydronephrosis on the left  No CT evidence of nephrolithiasis  There is mild bilateral renal cortical thinning  STOMACH AND BOWEL:  No bowel obstruction  No focal inflammatory process  APPENDIX:  A normal appendix was visualized  ABDOMINOPELVIC CAVITY:  No ascites  No pneumoperitoneum  No lymphadenopathy  VESSELS:  The abdominal aorta is calcified but within normal limits for caliber  No retroperitoneal hematoma  PELVIS REPRODUCTIVE ORGANS:  Prostatic calcifications are seen  URINARY BLADDER:  As mentioned above in the kidney /ureter section   ABDOMINAL WALL/INGUINAL REGIONS:  Inflammatory stranding in the lower anterior abdominal wall, unchanged and may reflect scarring  OSSEOUS STRUCTURES:  Prominent Schmorl's node inferior endplate of L2  Additional multifocal Schmorl's nodes are seen in the lower thoracic spine  Old fracture deformity is of the right posterior 8th through 10th ribs  Impression: Persistent moderate right-sided hydroureteronephrosis without a discrete ureteral calculus  There is however a large bladder mass now seen which may reflect hematoma and/or neoplasm  There is possible soft tissue density extending into the right ureterovesical junction  This can be further evaluated with cystoscopy  Workstation performed: [id number]     XR chest portable    Result Date: 5/27/2022  Narrative: CHEST INDICATION:   Rule out pneumonia  Blood in urine  COMPARISON:  Chest radiograph and CT from 5/23/2021  EXAM PERFORMED/VIEWS:  XR CHEST PORTABLE FINDINGS:  Right PICC in lower SVC  Cardiomediastinal silhouette appears unremarkable  Low lung volumes with mild bibasilar atelectasis  No effusion or pneumothorax  Osseous structures appear within normal limits for patient age  Suture anchors in left humeral head  Impression: Mild bibasilar atelectasis with nothing to suggest pneumonia  Workstation performed: [id number]     IR nephrostomy tube placement    Result Date: 5/31/2022  Narrative: INDICATION: Malignant obstruction  PROCEDURE: 1  Right ultrasound guided renal collecting system access 2  Right nephrostogram 3  Right nephrostomy tube placement FINDINGS: 1  Severe right hydronephrosis, hematuria 2  Appropriate position of new 8-Persian right nephrostomy tube  Impression: Right nephrostomy tube placement  Recommend daily tube flush   Plan for routine exchange in 3 months  _______________________________________________________________ COMPARISON: CT abdomen 5/23/2022 PROCEDURE DETAILS: Operators: Dr Martha Wray Anesthesia: General and local anesthesia  Medications: 1% lidocaine, please refer to anesthesia medical record Contrast: 5 mL of Omnipaque 300 Fluoroscopy time: 2 3 minutes Images: 5 COMMENTS: A preprocedure timeout was performed per [facility] protocol  The right flank was prepped and draped in the usual sterile fashion  The right renal collecting system was accessed through a posterior lower pole calyx under continuous ultrasound guidance using a 21-gauge needle  Prompt return of bloody urine confirmed collecting system access  Injection of a small amount of contrast outlined a dilated renal collecting system  Under fluoroscopic guidance, a nitinol wire was advanced into the renal collecting system followed by needle exchanged for an AccuStick sheath  Antegrade nephrostogram was then performed  After J-wire advancement through the sheath, the sheath was removed and a 8-Vincentian nephrostomy tube was advanced with pigtail formed in the renal pelvis  Contrast injection confirmed appropriate positioning  The catheter was then flushed, secured to the skin with 2-0 Prolene, connected to bag drainage and sterile dressings applied  Workstation performed: Esperanza Adan kidney and bladder    Result Date: 5/27/2022  Narrative: RENAL ULTRASOUND INDICATION:   Follow-up hydronephrosis  COMPARISON: Compared with CT of 5/23/2022 TECHNIQUE:   Ultrasound of the retroperitoneum was performed with a curvilinear transducer utilizing volumetric sweeps and still imaging techniques  FINDINGS: KIDNEYS: Symmetric and normal size  Right kidney:  9 4 x 5 0 x 5 5 cm  Volume 137 0 mL Left kidney:  9 5 x 4 4 x 4 3 cm  Volume 92 4 mL Right kidney Cortical thinning  No mass is identified  Mostly unchanged moderate to severe hydronephrosis  No shadowing calculi  No perinephric fluid collections  Left kidney Normal echogenicity and contour  No mass is identified  No hydronephrosis  No shadowing calculi  No perinephric fluid collections  URETERS: Nonvisualized  BLADDER: Webb catheter in the decompressed bladder  Impression: Mostly unchanged moderate to severe right kidney hydronephrosis with cortical thinning  Workstation performed: Patricia Cullen bedside procedure    Result Date: 6/9/2022  Narrative: 1 2 [id number]  2 446 246 [id number] 41 1    ECOG :2      Assessment and plan:    75-year-old  male with complicated medical history including alcoholic cirrhosis of the liver, with subsequent liver transplant in 2010, hepatitis-C status post treatment, diabetes mellitus type 2, neuropathy, chronic kidney disease secondary to tacrolimus, coronary artery disease, nephrolithiasis was found to have gross hematuria in May 2022, cystoscopies showed at least 5 cm mass involving the right lateral posterior wall, ureterovesical junction on the trigone, could not be stented, status post right nephrostomy tube    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    CT scan did not show any local or distant metastasis    1  The patient status post liver transplant I would be very hesitant to give the patient immunotherapy such as pembrolizumab  2   He is not a candidate for surgical resection patient to be treated with concurrent radiation therapy with gemcitabine with the 27 mg per m2 twice weekly    Side effects such as pancytopenia, allergic reaction, nausea, skin rash etc  told he agreed to proceed he signed the consent     We will see the patient on monthly basis    He will follow up with Radiation Oncology for initiation of radiation    Microcytic anemia most likely secondary to chronic kidney disease, will arrange for iron studies if he has low iron/ferritin will give the patient IV iron

## 2022-06-15 NOTE — TELEPHONE ENCOUNTER
While we try to accommodate patient requests, our priority is to schedule treatment according to Doctor's orders and site availability  1  Does the Provider use the intake sheet or checkout note? CHECKOUT NOTES  2  What would be a preferred day of the week that would work best for your infusion appointment? Tuesdays and Thursday  3  Do you prefer mornings or afternoons for your appointments? AFTERNOON  4  Are there any days or dates that do not work for your schedule, including any upcoming vacations? NONE  5  We are going to try our best to schedule you at the infusion center closest to your home  In the event that we are unable to what would be your next preferred infusion site or sites? 1  BE  2  AN  3  AL    6  Do you have transportation to take you to all of your appointments? [facility] just signed him up  7  Would you like the infusion center to draw labs from your port? NO (disregard if patient doesn't have a port or need labs for infusion appointment)

## 2022-06-16 ENCOUNTER — PATIENT OUTREACH (OUTPATIENT)
Dept: FAMILY MEDICINE CLINIC | Facility: CLINIC | Age: 72
End: 2022-06-16

## 2022-06-16 DIAGNOSIS — Z78.9 UNDER CARE OF SOCIAL WORKER: Primary | ICD-10-CM

## 2022-06-16 RX ORDER — SODIUM CHLORIDE 9 MG/ML
20 INJECTION, SOLUTION INTRAVENOUS ONCE
Status: CANCELLED | OUTPATIENT
Start: 2022-06-28

## 2022-06-16 RX ORDER — SODIUM CHLORIDE 9 MG/ML
20 INJECTION, SOLUTION INTRAVENOUS ONCE
Status: CANCELLED | OUTPATIENT
Start: 2022-07-19

## 2022-06-16 RX ORDER — SODIUM CHLORIDE 9 MG/ML
20 INJECTION, SOLUTION INTRAVENOUS ONCE
Status: CANCELLED | OUTPATIENT
Start: 2022-07-12

## 2022-06-16 RX ORDER — SODIUM CHLORIDE 9 MG/ML
20 INJECTION, SOLUTION INTRAVENOUS ONCE
Status: CANCELLED | OUTPATIENT
Start: 2022-08-09

## 2022-06-16 RX ORDER — SODIUM CHLORIDE 9 MG/ML
20 INJECTION, SOLUTION INTRAVENOUS ONCE
Status: CANCELLED | OUTPATIENT
Start: 2022-08-05

## 2022-06-16 RX ORDER — SODIUM CHLORIDE 9 MG/ML
20 INJECTION, SOLUTION INTRAVENOUS ONCE
Status: CANCELLED | OUTPATIENT
Start: 2022-07-08

## 2022-06-16 RX ORDER — SODIUM CHLORIDE 9 MG/ML
20 INJECTION, SOLUTION INTRAVENOUS ONCE
Status: CANCELLED | OUTPATIENT
Start: 2022-07-15

## 2022-06-16 RX ORDER — SODIUM CHLORIDE 9 MG/ML
20 INJECTION, SOLUTION INTRAVENOUS ONCE
Status: CANCELLED | OUTPATIENT
Start: 2022-07-29

## 2022-06-16 RX ORDER — SODIUM CHLORIDE 9 MG/ML
20 INJECTION, SOLUTION INTRAVENOUS ONCE
Status: CANCELLED | OUTPATIENT
Start: 2022-07-22

## 2022-06-16 RX ORDER — SODIUM CHLORIDE 9 MG/ML
20 INJECTION, SOLUTION INTRAVENOUS ONCE
Status: CANCELLED | OUTPATIENT
Start: 2022-08-02

## 2022-06-16 RX ORDER — SODIUM CHLORIDE 9 MG/ML
20 INJECTION, SOLUTION INTRAVENOUS ONCE
Status: CANCELLED | OUTPATIENT
Start: 2022-07-26

## 2022-06-16 RX ORDER — SODIUM CHLORIDE 9 MG/ML
20 INJECTION, SOLUTION INTRAVENOUS ONCE
Status: CANCELLED | OUTPATIENT
Start: 2022-07-01

## 2022-06-16 RX ORDER — SODIUM CHLORIDE 9 MG/ML
20 INJECTION, SOLUTION INTRAVENOUS ONCE
Status: CANCELLED | OUTPATIENT
Start: 2022-07-05

## 2022-06-16 NOTE — PROGRESS NOTES
Consulted with Marshall Earl and Mrs Vitor Hardy wishes to apply for MA benefits  Patient may also need to apply for [facility] to assist with outstanding co payments and hospital bills    Referral placed to Luis Diallo, [address] [place] [address] to assist

## 2022-06-16 NOTE — PROGRESS NOTES
Call to [facility] DME, verified that out of pocket cost to patient for [place] system would be $730 00, $649 00 remains on deductible  At this time they are holding the order until wife calls them back

## 2022-06-16 NOTE — PROGRESS NOTES
Follow up call with patients wife  She reports that Asa Heading is "doing ok"  They met with Oncologist yesterday and he will start with chemotherapy on 6/21/22  He continues to receive home health nurse visits from [facility]   She is being taught by RN on nephrostomy tube flushes  Presently they report occasional blood tinged urine  Denies any recent falls or loss of balance  Appetite fair  Inquired about Luztyrone Casarez  As per wife they have to pay over "$700" out of pocket  She would like to start application for Medical Assistance  She states she is "not very good at the computer"  Agrees to assistance from Case Management  to schedule a visit to assist her with application  Wife appreciative for same  She denies further questions or concerns at this time

## 2022-06-20 ENCOUNTER — PATIENT OUTREACH (OUTPATIENT)
Dept: FAMILY MEDICINE CLINIC | Facility: CLINIC | Age: 72
End: 2022-06-20

## 2022-06-20 NOTE — PROGRESS NOTES
CMOC: Called patient and left a voice message introducing myself and why I was calling  Yunier Fitzgerald left my contact information and asked that patient returns my call  CMOC:Will follow up with a second call in 2 business days

## 2022-06-21 ENCOUNTER — PATIENT OUTREACH (OUTPATIENT)
Dept: FAMILY MEDICINE CLINIC | Facility: CLINIC | Age: 72
End: 2022-06-21

## 2022-06-21 ENCOUNTER — PATIENT OUTREACH (OUTPATIENT)
Dept: HEMATOLOGY ONCOLOGY | Facility: CLINIC | Age: 72
End: 2022-06-21

## 2022-06-21 ENCOUNTER — HOSPITAL ENCOUNTER (OUTPATIENT)
Dept: INFUSION CENTER | Facility: HOSPITAL | Age: 72
Discharge: HOME | End: 2022-06-21
Attending: INTERNAL MEDICINE

## 2022-06-21 ENCOUNTER — TELEPHONE (OUTPATIENT)
Dept: RADIOLOGY | Facility: HOSPITAL | Age: 72
End: 2022-06-21

## 2022-06-21 VITALS
TEMPERATURE: 96.9 F | DIASTOLIC BLOOD PRESSURE: 72 MMHG | BODY MASS INDEX: 22.07 KG/M2 | RESPIRATION RATE: 20 BRPM | WEIGHT: 124.56 LBS | SYSTOLIC BLOOD PRESSURE: 162 MMHG | HEIGHT: 63 IN | HEART RATE: 70 BPM

## 2022-06-21 DIAGNOSIS — C67.0 MALIGNANT NEOPLASM OF TRIGONE OF URINARY BLADDER (HCC): Primary | ICD-10-CM

## 2022-06-21 DIAGNOSIS — R11.0 CHEMOTHERAPY-INDUCED NAUSEA: ICD-10-CM

## 2022-06-21 DIAGNOSIS — T45.1X5A CHEMOTHERAPY-INDUCED NAUSEA: ICD-10-CM

## 2022-06-21 DIAGNOSIS — C67.0 MALIGNANT NEOPLASM OF TRIGONE OF URINARY BLADDER (HCC): ICD-10-CM

## 2022-06-21 RX ORDER — ONDANSETRON 4 MG/1
4 TABLET, FILM COATED ORAL EVERY 8 HOURS PRN
Qty: 20 TABLET | Refills: 1 | Status: SHIPPED | OUTPATIENT
Start: 2022-06-21

## 2022-06-21 RX ORDER — CEFAZOLIN SODIUM 1 G/50ML
1000 SOLUTION INTRAVENOUS ONCE
Status: CANCELLED | OUTPATIENT
Start: 2022-06-21 | End: 2022-06-21

## 2022-06-21 RX ORDER — SODIUM CHLORIDE 9 MG/ML
75 INJECTION, SOLUTION INTRAVENOUS CONTINUOUS
Status: CANCELLED | OUTPATIENT
Start: 2022-06-21

## 2022-06-21 NOTE — PROGRESS NOTES
CMOC: Called and spoke to [facility]  I set up a home visit for 6/22/22 @1pm   Reason for visit is to complete MA application and apply for [facility] related to me that Tani Reese was diagnosed yesterday with bladder cancer  Someone from oncology was at their home today for blood work

## 2022-06-21 NOTE — PROGRESS NOTES
PT here for chemo today  PT to get concurrent chemo and radiation  Wife unsure what time Radiation is today  Spoke with RT,  Pt not scheduled to start treatment  Discussed with Silvia as well as the fact that patient has very poor venous access and requires a PICC line when he is inpatient  Will hold chemo today, get patient scheduled for port and RT consult  Jose C Damian made aware and verbalized understanding  Will also cancel treatment for Friday  Raheel Pro will make patient aware

## 2022-06-21 NOTE — PROGRESS NOTES
Pt arrived for chemotherapy treatment today but has not seen Radiation oncology  Need urgent appt to discuss concurrent chemotherapy and radiation for MIBC  Dr Comfort Gudino placed a referral   Was deemed not a surgical candidate    Saw Dr Mariano Dwyer while hospitalized

## 2022-06-21 NOTE — PROGRESS NOTES
Left voicemail letting pt and Noy Kline know port scheduled for 6/23 at 10am  Advised IR will call the night before with further instruction  Also let them know 6/24 has been cancelled and I will be in touch with new schedule as soon as soon as radiation schedules him

## 2022-06-21 NOTE — PROGRESS NOTES
Spoke with [facility] regarding need for radiation set up appointment and central line due to poor venous access  Explained once these are scheduled we can reschedule his chemo  She verbalized understanding

## 2022-06-21 NOTE — PROGRESS NOTES
Pt's daughter called to state that pt starts chemo today and has not received script for Zofran    Reached out to office via Teams to make aware

## 2022-06-21 NOTE — PRE-PROCEDURE INSTRUCTIONS
Pre-procedure Instructions for Interventional Radiology  [address] [place] [address] [address] [phone]    You are scheduled for a/an [facility] Placement  On Thursday 6/23/22  Your tentative arrival time is 0900  Short stay will notify you the day before your procedure with the exact arrival time and the location to arrive  To prepare for your procedure:  1  Please arrange for someone to drive you home after the procedure and stay with you until the next morning if you are instructed to do so  This is typically for patients receiving some type of sedative or anesthetic for the procedure  2  DO NOT EAT OR DRINK ANYTHING after midnight on the evening before your procedure including candy & gum   3  ONLY SIPS OF WATER with your medications are allowed on the morning of your procedure  4  TAKE ALL OF YOUR REGULAR MEDICATIONS THE MORNING OF YOUR PROCEDURE with sips of water! We may call you to stop some of your blood sugar, blood pressure and blood thinning medications depending on the procedure  Please take all of these medications unless we instruct you to stop them  5  If you have an allergy to x-ray dye, please contact Interventional Radiology for an x-ray dye preparation which usually consists of an oral steroid and Benadryl  The day of your procedure:  1  Bring a list of the medications you take at home  2  Bring medications you take for breathing problems (such as inhalers), medications for chest pain, or both  3  Bring a case for your glasses or contacts  4  Bring your insurance card and a form of photo ID   5  Please leave all valuables such as credit cards and jewelry at home  6  Report to the registration desk in the main lobby at the [facility]  Ask to be directed to [facility]  7  While your procedure is being performed, your family may wait in the Radiology Waiting Room on the 1st floor in Radiology    if they need to leave, they may provide a number to be called following the procedure  8  Be prepared to stay overnight just in case  Sometimes procedures will indicate the need for further observation or treatment  9  If you are scheduled for a follow-up visit with the Interventional Radiologist after your procedure, you will be called with a date and time  10  Covid vaccine plus booster completed      Special Instructions (Medications to stop taking before your procedure etc ):

## 2022-06-21 NOTE — TELEPHONE ENCOUNTER
Received call from [facility] requesting script for nausea be sent to [facility]  Zofran pended for signature

## 2022-06-21 NOTE — TELEPHONE ENCOUNTER
Called patient's daughter Francisca Manzo  Francisca Manzo states her father scheduled for his initial chemotherapy infusion today  Patient's flores is out  Voiding without issue  Leanna aware her father's nephrostomy tube needs to be changed every 3 months by the IR department  Placement of Nephrostomy tube 5/28/2022  Francisca Manzo feels she or her mom do not need a visit with Dr Nirali Mendez  All questions have been answered by Med Onc  Plan to see patient at the completion of chemotherapy/radiaiton  Francisca Manzo will contact the office at the time

## 2022-06-22 ENCOUNTER — PATIENT OUTREACH (OUTPATIENT)
Dept: FAMILY MEDICINE CLINIC | Facility: CLINIC | Age: 72
End: 2022-06-22

## 2022-06-22 NOTE — PROGRESS NOTES
CMOC: Meet with patient at his home today  His daughter and significant other were present  Patient is grateful for all the help he has been receiving from Kady Rodrigues  Patient will be transported to and from Oncology appointments through Kadyshawn Rodrigues  Patient Xiomara Viveros is also appreciative of this service  Yossi Shelby takes care of he ailing mother, disabled daughter in addition to out patient  She would like some help in the future  She is starting to get "pulled to far"  Patient was to start Chemo yesterday, however they are waiting for his Port to placed on 6/23/22  Visiting Nurse Tray Spicer was arriving as I was leaving to follow up with patient  While I was at their home we Started he MA application and will also apply for SNAP benefits as well  CMOC will continue to work on [facility] application after visit  We could not finish due to IT problems  CMOC will follow up with family once application is completed  completetion expected 6/23/22

## 2022-06-23 ENCOUNTER — PATIENT OUTREACH (OUTPATIENT)
Dept: FAMILY MEDICINE CLINIC | Facility: CLINIC | Age: 72
End: 2022-06-23

## 2022-06-23 ENCOUNTER — HOSPITAL ENCOUNTER (OUTPATIENT)
Dept: RADIOLOGY | Facility: HOSPITAL | Age: 72
Discharge: HOME | End: 2022-06-23
Attending: INTERNAL MEDICINE | Admitting: RADIOLOGY
Payer: COMMERCIAL

## 2022-06-23 VITALS
RESPIRATION RATE: 16 BRPM | DIASTOLIC BLOOD PRESSURE: 59 MMHG | OXYGEN SATURATION: 97 % | HEIGHT: 62 IN | BODY MASS INDEX: 23 KG/M2 | HEART RATE: 68 BPM | WEIGHT: 125 LBS | TEMPERATURE: 98 F | SYSTOLIC BLOOD PRESSURE: 119 MMHG

## 2022-06-23 DIAGNOSIS — C67.0 MALIGNANT NEOPLASM OF TRIGONE OF URINARY BLADDER (HCC): ICD-10-CM

## 2022-06-23 PROCEDURE — 99153 MOD SED SAME PHYS/QHP EA: CPT

## 2022-06-23 PROCEDURE — 77001 FLUOROGUIDE FOR VEIN DEVICE: CPT | Performed by: RADIOLOGY

## 2022-06-23 PROCEDURE — 76937 US GUIDE VASCULAR ACCESS: CPT

## 2022-06-23 PROCEDURE — 99152 MOD SED SAME PHYS/QHP 5/>YRS: CPT

## 2022-06-23 PROCEDURE — 36561 INSERT TUNNELED CV CATH: CPT | Performed by: RADIOLOGY

## 2022-06-23 PROCEDURE — 77001 FLUOROGUIDE FOR VEIN DEVICE: CPT

## 2022-06-23 PROCEDURE — C1788 PORT, INDWELLING, IMP: HCPCS

## 2022-06-23 PROCEDURE — 76937 US GUIDE VASCULAR ACCESS: CPT | Performed by: RADIOLOGY

## 2022-06-23 PROCEDURE — 36561 INSERT TUNNELED CV CATH: CPT

## 2022-06-23 RX ORDER — ACETAMINOPHEN 325 MG/1
650 TABLET ORAL EVERY 4 HOURS PRN
Status: DISCONTINUED | OUTPATIENT
Start: 2022-06-23 | End: 2022-06-24 | Stop reason: HOSPADM

## 2022-06-23 RX ORDER — SODIUM CHLORIDE 9 MG/ML
75 INJECTION, SOLUTION INTRAVENOUS CONTINUOUS
Status: DISCONTINUED | OUTPATIENT
Start: 2022-06-23 | End: 2022-06-24 | Stop reason: HOSPADM

## 2022-06-23 RX ORDER — CEFAZOLIN SODIUM 1 G/50ML
1000 SOLUTION INTRAVENOUS ONCE
Status: COMPLETED | OUTPATIENT
Start: 2022-06-23 | End: 2022-06-23

## 2022-06-23 RX ORDER — FENTANYL CITRATE 50 UG/ML
INJECTION, SOLUTION INTRAMUSCULAR; INTRAVENOUS CODE/TRAUMA/SEDATION MEDICATION
Status: COMPLETED | OUTPATIENT
Start: 2022-06-23 | End: 2022-06-23

## 2022-06-23 RX ORDER — MIDAZOLAM HYDROCHLORIDE 2 MG/2ML
INJECTION, SOLUTION INTRAMUSCULAR; INTRAVENOUS CODE/TRAUMA/SEDATION MEDICATION
Status: COMPLETED | OUTPATIENT
Start: 2022-06-23 | End: 2022-06-23

## 2022-06-23 RX ADMIN — MIDAZOLAM 1 MG: 1 INJECTION INTRAMUSCULAR; INTRAVENOUS at 10:01

## 2022-06-23 RX ADMIN — MIDAZOLAM 0.5 MG: 1 INJECTION INTRAMUSCULAR; INTRAVENOUS at 10:22

## 2022-06-23 RX ADMIN — FENTANYL CITRATE 50 MCG: 50 INJECTION INTRAMUSCULAR; INTRAVENOUS at 10:13

## 2022-06-23 RX ADMIN — CEFAZOLIN SODIUM 1000 MG: 1 SOLUTION INTRAVENOUS at 10:00

## 2022-06-23 RX ADMIN — Medication 20 ML: at 10:21

## 2022-06-23 RX ADMIN — MIDAZOLAM 1 MG: 1 INJECTION INTRAMUSCULAR; INTRAVENOUS at 10:13

## 2022-06-23 RX ADMIN — FENTANYL CITRATE 50 MCG: 50 INJECTION INTRAMUSCULAR; INTRAVENOUS at 10:01

## 2022-06-23 NOTE — PROGRESS NOTES
OPCM ALPHONSO spoke briefly with Maverick Jurado yesterday while in patient's home with patient and wife  Ana Franco inquiring if waiver application should be made at this time as patient is performing all ADLs independently  OPCM ALPHONSO advised that while patient remains independent and does not need assistance with ADLs or IADLs patient would not qualify for services  Phone call placed to [facility] today to check on LOC assessment  Referral went to Sorin Chacon who contacted [facility] on 6/6/22 who declined services reporting they do not want services at this time and will call back when they are needed   notes indicate that wife does not want any further referrals made  CMOC is assisting patient with MA and SNAP application  Patient will begin chemo treatment and will be transported through [address]

## 2022-06-23 NOTE — BRIEF OP NOTE (RAD/CATH)
INTERVENTIONAL RADIOLOGY PROCEDURE NOTE    Date: 6/23/2022    Procedure: IR PORT PLACEMENT    Preoperative diagnosis:   1  Malignant neoplasm of trigone of urinary bladder (HCC)         Postoperative diagnosis: Same  Surgeon: Terri Luna MD     Assistant: None  No qualified resident was available  Blood loss:  Minimal    Specimens:  None     Findings:  Right chest port placed with image guidance    Complications: None immediate      Anesthesia: conscious sedation

## 2022-06-23 NOTE — SEDATION DOCUMENTATION
Right chest port placement performed by Dr Marianela Cochran  Procedure tolerated well, VSS  IR Procedure Bedrest Start Time is 5294

## 2022-06-23 NOTE — DISCHARGE INSTRUCTIONS
Implanted Venous Access Port     WHAT YOU NEED TO KNOW:   An implanted venous access port is a device used to give treatments and take blood  It may also be called a central venous access device (CVAD)  The port is a small container that is placed under your skin, usually in your upper chest  The port is attached to a catheter that enters a large vein  DISCHARGE INSTRUCTIONS:   Resume your normal diet  Small sips of flat soda will help with mild nausea  Prevent an infection:   Wash your hands often  Use soap and water  Clean your hands before and after you care for your port  Remind everyone who cares for your port to wash their hands  Check your skin for infection every day  Look for redness, swelling, or fluid oozing from the port site  Care for your port:   1  You may shower beginning 48 hours after procedure  2   Leave glue in place  3  It is normal for some bruising to occur  4  Use Tylenol for pain  5  Limit use of arm on the side that your port was placed  Lift nothing heavier than 5 pounds for 1 week, and then gradually increase activity as tolerated  6  DO NOT apply ointment, lotion or cream to port site until incision is healed  Allow glue to fall off  DO NOT attempt to peel glue from skin even it it begins to flake  7  After the port incision is healed you may swim, bathe  Notify the Interventional Radiologist if you have any of the followin  Fever above 101 F    2  Increased redness or swelling after 1st day  3  Increased pain after 1st day  4  Any sign of infection (drainage from port site, skin separation, hot to touch)  5  Persistent nausea or vomiting  Contact Interventional Radiology at [phone] [facility] PATIENTS: Contact Interventional Radiology at [phone]) ([address]: Contact Interventional Radiology at [phone])

## 2022-06-24 ENCOUNTER — PATIENT OUTREACH (OUTPATIENT)
Dept: HEMATOLOGY ONCOLOGY | Facility: CLINIC | Age: 72
End: 2022-06-24

## 2022-06-24 NOTE — PROGRESS NOTES
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  Home/Cell Phone: [phone]  Emergency Contact: wife, Sha Swanson  Marital Status:    Interpretation concerns, speaks another language, preferred language:  Cultural concerns: Pt's first language is Croatian  Ability to read or write:     Caregiver/Support:wife  Children: 1 daughter who is disabled 28 years but has mentation of a 8year old  Child/Elder care: daughter with disability    Housing:Own home  Home Setup: 2 story  Lives With: spouse and daughter  Daily Living Activities: enjoys walking  Durable Medical Equipment: no  Ambulation: ambulates independently    Preferred Pharmacy:  [facility] co-pays with insurance: [employer] MR  High co-pays with medication coverage: not at this time  No medication coverage:     Primary Care Provider:  Jatin Cheng  Hx of [address]: yes  Hx of Short term rehab: yes, 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  Mental Health Hx: No  Substance Abuse Hx: No  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 [employer] after his liver transplant   Status/Location: No  Ability to pay bills: yes  POA/LW/AD: No  Transportation Plan/Concerns: Pt will use [facility]      What do you know about your Cancer Diagnosis    What has your doctor told you about your cancer diagnosis: Pt's wife spoke of the bladder cancer  She reports that the pt is worried about the unknown  What has your doctor told you about your cancer treatment: Pt and wife are aware that the pt will need chemo and radiation    What specific concerns do you have about your diagnosis and treatment: Pt expressed concerned over how he will feel after treatment  Have you been made aware of any hair loss associated with treatment: N/A    Additional Comments: MSW received a referral for this pt from the Nurse Navigator  Outreach was made and the pt was open and receptive of this call    He was able to provide some of the information and then asked that his wife be called on her cell phone  The pt's wife answered and explained how she has been caring for her  for many years  She proceeded to discuss his health issues and included in this was his liver transplant  When asked how she was coping, she described feeling the way she did at the time of the transplant, overwhelmed and confused over appointments  She explained that the pt was newly diagnosed at the end of May and how she feels since that time, there have been multiple appointments and people calling her  She described how challenging it has been to keep all of this information straight  Additionally, their daughter is 28years old and pt's wife states that she has the mentation of a 8year old  The daughter worries about the pt and spouse feels conflicted as to how much to tell her  One question that the daughter asked repeatedly is when will the pt have the nephrostomy tube removed and pt's wife informs her that she is not sure  MSW acknowledged the stress that she has been feeling and validated her feelings  Self care was promoted and the pt's wife shared how challenging this can be with caring for her mother, daughter and spouse  When asked about support, she shared that the pt's family is supportive  MSW provided contact information and encouraged her to reach out for support as needed  MSW will continue to follow

## 2022-06-27 ENCOUNTER — APPOINTMENT (OUTPATIENT)
Dept: RADIATION ONCOLOGY | Facility: CLINIC | Age: 72
End: 2022-06-27
Payer: COMMERCIAL

## 2022-06-27 ENCOUNTER — PATIENT OUTREACH (OUTPATIENT)
Dept: HEMATOLOGY ONCOLOGY | Facility: CLINIC | Age: 72
End: 2022-06-27

## 2022-06-27 DIAGNOSIS — C67.0 MALIGNANT NEOPLASM OF TRIGONE OF URINARY BLADDER (HCC): Primary | ICD-10-CM

## 2022-06-27 DIAGNOSIS — Z79.4 TYPE 2 DIABETES MELLITUS WITH DIABETIC PERIPHERAL ANGIOPATHY WITHOUT GANGRENE, WITH LONG-TERM CURRENT USE OF INSULIN (HCC): ICD-10-CM

## 2022-06-27 DIAGNOSIS — Z94.4 LIVER TRANSPLANT STATUS (HCC): ICD-10-CM

## 2022-06-27 DIAGNOSIS — C67.0 MALIGNANT NEOPLASM OF TRIGONE OF URINARY BLADDER (HCC): ICD-10-CM

## 2022-06-27 DIAGNOSIS — Z79.4 TYPE 2 DIABETES MELLITUS WITH DIABETIC PERIPHERAL ANGIOPATHY WITHOUT GANGRENE, WITH LONG-TERM CURRENT USE OF INSULIN (HCC): Primary | ICD-10-CM

## 2022-06-27 DIAGNOSIS — E11.51 TYPE 2 DIABETES MELLITUS WITH DIABETIC PERIPHERAL ANGIOPATHY WITHOUT GANGRENE, WITH LONG-TERM CURRENT USE OF INSULIN (HCC): Primary | ICD-10-CM

## 2022-06-27 DIAGNOSIS — E11.51 TYPE 2 DIABETES MELLITUS WITH DIABETIC PERIPHERAL ANGIOPATHY WITHOUT GANGRENE, WITH LONG-TERM CURRENT USE OF INSULIN (HCC): ICD-10-CM

## 2022-06-27 PROCEDURE — 77399 UNLISTED PX MED RADJ PHYSICS: CPT | Performed by: RADIOLOGY

## 2022-06-27 PROCEDURE — 77334 RADIATION TREATMENT AID(S): CPT | Performed by: RADIOLOGY

## 2022-06-27 PROCEDURE — 77290 THER RAD SIMULAJ FIELD CPLX: CPT | Performed by: RADIOLOGY

## 2022-06-27 NOTE — PROGRESS NOTES
Received message from pt requesting call back  Call to pt and reviewed appts  Confirmed he will be there for his chemotherapy tomorrow at 8:30am   States he feels great!

## 2022-06-27 NOTE — PROGRESS NOTES
Left a message for significant other, Rosibel to see where she would like the rx for Ensure sent to or if she would like to pick it up

## 2022-06-27 NOTE — PROGRESS NOTES
Call placed to pt to introduce myself as Nurse Navigator  Explained my role and ways to connect pt with available resources  Reviewed upcoming appts  Provided support and encouraged to call with any questions or concerns  She expressed that pt's insurance will cover Ensure and she would like to get prescriptions for it  Discussed nutrition referral also and she is agreeable  Palliative care referral also placed due to multiple co-morbidities

## 2022-06-28 ENCOUNTER — HOSPITAL ENCOUNTER (OUTPATIENT)
Dept: INFUSION CENTER | Facility: HOSPITAL | Age: 72
Discharge: HOME | End: 2022-06-28
Attending: INTERNAL MEDICINE
Payer: COMMERCIAL

## 2022-06-28 ENCOUNTER — DOCUMENTATION (OUTPATIENT)
Dept: HEMATOLOGY ONCOLOGY | Facility: CLINIC | Age: 72
End: 2022-06-28

## 2022-06-28 ENCOUNTER — TELEPHONE (OUTPATIENT)
Dept: NUTRITION | Facility: CLINIC | Age: 72
End: 2022-06-28

## 2022-06-28 VITALS
BODY MASS INDEX: 22.34 KG/M2 | RESPIRATION RATE: 18 BRPM | SYSTOLIC BLOOD PRESSURE: 118 MMHG | WEIGHT: 126.1 LBS | DIASTOLIC BLOOD PRESSURE: 64 MMHG | HEART RATE: 66 BPM | TEMPERATURE: 97.5 F | HEIGHT: 63 IN

## 2022-06-28 DIAGNOSIS — C67.0 MALIGNANT NEOPLASM OF TRIGONE OF URINARY BLADDER (HCC): Primary | ICD-10-CM

## 2022-06-28 PROBLEM — Z95.828 PORT-A-CATH IN PLACE: Status: ACTIVE | Noted: 2022-06-28

## 2022-06-28 PROCEDURE — 96367 TX/PROPH/DG ADDL SEQ IV INF: CPT

## 2022-06-28 PROCEDURE — 96413 CHEMO IV INFUSION 1 HR: CPT

## 2022-06-28 RX ORDER — SODIUM CHLORIDE 9 MG/ML
20 INJECTION, SOLUTION INTRAVENOUS ONCE
Status: COMPLETED | OUTPATIENT
Start: 2022-06-28 | End: 2022-06-28

## 2022-06-28 RX ADMIN — SODIUM CHLORIDE 20 ML/HR: 0.9 INJECTION, SOLUTION INTRAVENOUS at 09:08

## 2022-06-28 RX ADMIN — GEMCITABINE 42.6 MG: 38 INJECTION, SOLUTION INTRAVENOUS at 09:49

## 2022-06-28 RX ADMIN — DEXAMETHASONE SODIUM PHOSPHATE 10 MG: 10 INJECTION, SOLUTION INTRAMUSCULAR; INTRAVENOUS at 09:13

## 2022-06-28 NOTE — PROGRESS NOTES
Confirmed ok to treat today even though pt does not start radiation until next week  Pt tolerated treatment today without incident  AVS given  Pt is aware of next appt on Friday  Appts scheduled for labs for pt as pt prefers to have labs drawn via port  Pt aware of same

## 2022-06-28 NOTE — TELEPHONE ENCOUNTER
Received notification by Nancy TIM , on 6/27/22 that pt has triggered for oncology nutrition care (reason for referral: Malnutrition Screening Tool (MST) Triggers: scored a 2 indicating 2-13# (0 9-6 kg) recent wt loss and is eating poorly due to a decreased appetite  (Date of MST: 6/27/22) and patient/spouse request)  Pastora Cadet  Spouse, Tamikoelise HudsonStarr, answered  Introduced self and explained the reason for today's call  She states John Kerns has lost about 7# recently  They are trying to get oral nutrition supplements through insurance    Discussed oncology nutrition services available (options for in-person and phone consultation) and the benefits of meeting for a consultation  Trista Starr states they are overwhelmed with appointments at this time  Offered to schedule a phone consultation if it is easier at this time  Trista Starr said she will take this RD's contact information and talk to John Kerns and reach out if they would like to schedule an appt  Provided this RDs contact information asking that John Kerns or Trista Starr reach out prn  All questions/concerns addressed at this time

## 2022-06-30 ENCOUNTER — PATIENT OUTREACH (OUTPATIENT)
Dept: FAMILY MEDICINE CLINIC | Facility: CLINIC | Age: 72
End: 2022-06-30

## 2022-06-30 DIAGNOSIS — C67.0 MALIGNANT NEOPLASM OF TRIGONE OF URINARY BLADDER (HCC): Primary | ICD-10-CM

## 2022-06-30 PROCEDURE — 77295 3-D RADIOTHERAPY PLAN: CPT | Performed by: RADIOLOGY

## 2022-06-30 PROCEDURE — 77300 RADIATION THERAPY DOSE PLAN: CPT | Performed by: RADIOLOGY

## 2022-06-30 PROCEDURE — 77334 RADIATION TREATMENT AID(S): CPT | Performed by: RADIOLOGY

## 2022-06-30 NOTE — PROGRESS NOTES
Patient huddle with Case Management  and   Family receiving assistance from outpatient care management team   As per CMOC,patient was approved for medical assistance and food stamps  Family overwhelmed with the multiple team members contact her  She is in need of Glucerna supplements and depends  Warm hand off to Asif Sifuentes, Oncology social worker  They will continue case management for [facility]  At this time will close to complex care management but would be happy to assist if any future needs

## 2022-06-30 NOTE — PROGRESS NOTES
[facility]  Oncology SW Bri Smith is now involved with patient and will take over for follow up  Evelyn Park advised that patient has been approved for medical assistance and SNAP  Patient is too functional for waiver services at this time  OPCM SW will close referral at this time

## 2022-07-01 ENCOUNTER — HOSPITAL ENCOUNTER (OUTPATIENT)
Dept: INFUSION CENTER | Facility: HOSPITAL | Age: 72
Discharge: HOME | End: 2022-07-01
Attending: INTERNAL MEDICINE
Payer: COMMERCIAL

## 2022-07-01 VITALS
HEART RATE: 75 BPM | RESPIRATION RATE: 18 BRPM | HEIGHT: 63 IN | TEMPERATURE: 97.2 F | BODY MASS INDEX: 22.27 KG/M2 | SYSTOLIC BLOOD PRESSURE: 129 MMHG | DIASTOLIC BLOOD PRESSURE: 72 MMHG | WEIGHT: 125.66 LBS

## 2022-07-01 DIAGNOSIS — C67.0 MALIGNANT NEOPLASM OF TRIGONE OF URINARY BLADDER (HCC): Primary | ICD-10-CM

## 2022-07-01 LAB
BASOPHILS # BLD AUTO: 0.05 THOUSANDS/ΜL (ref 0–0.1)
BASOPHILS NFR BLD AUTO: 1 % (ref 0–1)
EOSINOPHIL # BLD AUTO: 0.42 THOUSAND/ΜL (ref 0–0.61)
EOSINOPHIL NFR BLD AUTO: 6 % (ref 0–6)
ERYTHROCYTE [DISTWIDTH] IN BLOOD BY AUTOMATED COUNT: 16.6 % (ref 11.6–15.1)
HCT VFR BLD AUTO: 27.2 % (ref 36.5–49.3)
HGB BLD-MCNC: 8.4 G/DL (ref 12–17)
IMM GRANULOCYTES # BLD AUTO: 0.02 THOUSAND/UL (ref 0–0.2)
IMM GRANULOCYTES NFR BLD AUTO: 0 % (ref 0–2)
LYMPHOCYTES # BLD AUTO: 1.7 THOUSANDS/ΜL (ref 0.6–4.47)
LYMPHOCYTES NFR BLD AUTO: 25 % (ref 14–44)
MCH RBC QN AUTO: 24.1 PG (ref 26.8–34.3)
MCHC RBC AUTO-ENTMCNC: 30.9 G/DL (ref 31.4–37.4)
MCV RBC AUTO: 78 FL (ref 82–98)
MONOCYTES # BLD AUTO: 0.29 THOUSAND/ΜL (ref 0.17–1.22)
MONOCYTES NFR BLD AUTO: 4 % (ref 4–12)
NEUTROPHILS # BLD AUTO: 4.26 THOUSANDS/ΜL (ref 1.85–7.62)
NEUTS SEG NFR BLD AUTO: 64 % (ref 43–75)
NRBC BLD AUTO-RTO: 0 /100 WBCS
PLATELET # BLD AUTO: 88 THOUSANDS/UL (ref 149–390)
PMV BLD AUTO: 13.2 FL (ref 8.9–12.7)
RBC # BLD AUTO: 3.48 MILLION/UL (ref 3.88–5.62)
WBC # BLD AUTO: 6.74 THOUSAND/UL (ref 4.31–10.16)

## 2022-07-01 PROCEDURE — 85025 COMPLETE CBC W/AUTO DIFF WBC: CPT | Performed by: INTERNAL MEDICINE

## 2022-07-01 PROCEDURE — 96367 TX/PROPH/DG ADDL SEQ IV INF: CPT

## 2022-07-01 PROCEDURE — 96413 CHEMO IV INFUSION 1 HR: CPT

## 2022-07-01 RX ORDER — SODIUM CHLORIDE 9 MG/ML
20 INJECTION, SOLUTION INTRAVENOUS ONCE
Status: COMPLETED | OUTPATIENT
Start: 2022-07-01 | End: 2022-07-01

## 2022-07-01 RX ADMIN — SODIUM CHLORIDE 20 ML/HR: 0.9 INJECTION, SOLUTION INTRAVENOUS at 13:44

## 2022-07-01 RX ADMIN — GEMCITABINE 42.6 MG: 38 INJECTION, SOLUTION INTRAVENOUS at 14:12

## 2022-07-01 RX ADMIN — DEXAMETHASONE SODIUM PHOSPHATE 10 MG: 10 INJECTION, SOLUTION INTRAMUSCULAR; INTRAVENOUS at 13:44

## 2022-07-01 NOTE — PROGRESS NOTES
Chemotherapy infused without incident  Labs drawn via port for chemo Tuesday  Appointments confirmed with patient

## 2022-07-05 ENCOUNTER — APPOINTMENT (OUTPATIENT)
Dept: RADIATION ONCOLOGY | Facility: HOSPITAL | Age: 72
End: 2022-07-05
Payer: COMMERCIAL

## 2022-07-05 ENCOUNTER — HOSPITAL ENCOUNTER (OUTPATIENT)
Dept: INFUSION CENTER | Facility: HOSPITAL | Age: 72
Discharge: HOME | End: 2022-07-05
Attending: INTERNAL MEDICINE
Payer: COMMERCIAL

## 2022-07-05 ENCOUNTER — TELEPHONE (OUTPATIENT)
Dept: HEMATOLOGY ONCOLOGY | Facility: CLINIC | Age: 72
End: 2022-07-05

## 2022-07-05 VITALS
TEMPERATURE: 97.6 F | HEART RATE: 70 BPM | WEIGHT: 127.21 LBS | SYSTOLIC BLOOD PRESSURE: 142 MMHG | RESPIRATION RATE: 18 BRPM | HEIGHT: 63 IN | DIASTOLIC BLOOD PRESSURE: 67 MMHG | BODY MASS INDEX: 22.54 KG/M2

## 2022-07-05 DIAGNOSIS — R31.0 GROSS HEMATURIA: ICD-10-CM

## 2022-07-05 DIAGNOSIS — C67.0 MALIGNANT NEOPLASM OF TRIGONE OF URINARY BLADDER (HCC): Primary | ICD-10-CM

## 2022-07-05 LAB
FERRITIN SERPL-MCNC: 343 NG/ML (ref 8–388)
IRON SATN MFR SERPL: 16 % (ref 20–50)
IRON SERPL-MCNC: 30 UG/DL (ref 65–175)
TIBC SERPL-MCNC: 182 UG/DL (ref 250–450)

## 2022-07-05 PROCEDURE — 96367 TX/PROPH/DG ADDL SEQ IV INF: CPT

## 2022-07-05 PROCEDURE — 77280 THER RAD SIMULAJ FIELD SMPL: CPT | Performed by: RADIOLOGY

## 2022-07-05 PROCEDURE — 77387 GUIDANCE FOR RADJ TX DLVR: CPT | Performed by: RADIOLOGY

## 2022-07-05 PROCEDURE — 83550 IRON BINDING TEST: CPT

## 2022-07-05 PROCEDURE — 83540 ASSAY OF IRON: CPT

## 2022-07-05 PROCEDURE — 96413 CHEMO IV INFUSION 1 HR: CPT

## 2022-07-05 PROCEDURE — 77412 RADIATION TX DELIVERY LVL 3: CPT | Performed by: RADIOLOGY

## 2022-07-05 PROCEDURE — 82728 ASSAY OF FERRITIN: CPT

## 2022-07-05 RX ORDER — SODIUM CHLORIDE 9 MG/ML
20 INJECTION, SOLUTION INTRAVENOUS ONCE
Status: COMPLETED | OUTPATIENT
Start: 2022-07-05 | End: 2022-07-05

## 2022-07-05 RX ADMIN — SODIUM CHLORIDE 20 ML/HR: 0.9 INJECTION, SOLUTION INTRAVENOUS at 13:26

## 2022-07-05 RX ADMIN — GEMCITABINE 42.6 MG: 38 INJECTION, SOLUTION INTRAVENOUS at 13:56

## 2022-07-05 RX ADMIN — DEXAMETHASONE SODIUM PHOSPHATE 10 MG: 10 INJECTION, SOLUTION INTRAMUSCULAR; INTRAVENOUS at 13:26

## 2022-07-05 NOTE — PROGRESS NOTES
Pt tolerated treatment today without incident  Iron panel drawn per orders as pt's wife questioned iron infusion    Declined AVS but aware of next appt

## 2022-07-05 NOTE — TELEPHONE ENCOUNTER
CALL TRANSFER   Reason for patient call? patient's wife Santana Galvan is calling because they are at the [facility] right now and they want to know if Dr Leopoldo Prose wanted to order the patient an iron infusion   Patient's primary physician? Dr Leopoldo Prose   RN call was transferred to and time it was transferred? Ann Sawyer @ 209pm   Informed patient that the message will be forwarded to the team and someone will get back to them as soon as possible    Did you relay this information to the patient?  Yes

## 2022-07-06 ENCOUNTER — TELEPHONE (OUTPATIENT)
Dept: OTHER | Facility: OTHER | Age: 72
End: 2022-07-06

## 2022-07-06 ENCOUNTER — APPOINTMENT (OUTPATIENT)
Dept: RADIATION ONCOLOGY | Facility: HOSPITAL | Age: 72
End: 2022-07-06
Payer: COMMERCIAL

## 2022-07-06 PROCEDURE — G6002 STEREOSCOPIC X-RAY GUIDANCE: HCPCS | Performed by: RADIOLOGY

## 2022-07-06 PROCEDURE — 77412 RADIATION TX DELIVERY LVL 3: CPT | Performed by: RADIOLOGY

## 2022-07-06 PROCEDURE — 77387 GUIDANCE FOR RADJ TX DLVR: CPT | Performed by: RADIOLOGY

## 2022-07-06 NOTE — TELEPHONE ENCOUNTER
Pt has nephrostomy tube  Caller needs bags for patient as patients bag has ripped  Also caller wants to know long term plan for nephrostomy tube  Please call

## 2022-07-07 ENCOUNTER — APPOINTMENT (OUTPATIENT)
Dept: RADIATION ONCOLOGY | Facility: HOSPITAL | Age: 72
End: 2022-07-07
Payer: COMMERCIAL

## 2022-07-07 PROCEDURE — G6002 STEREOSCOPIC X-RAY GUIDANCE: HCPCS | Performed by: RADIOLOGY

## 2022-07-07 PROCEDURE — 77387 GUIDANCE FOR RADJ TX DLVR: CPT | Performed by: RADIOLOGY

## 2022-07-07 PROCEDURE — 77412 RADIATION TX DELIVERY LVL 3: CPT | Performed by: RADIOLOGY

## 2022-07-08 ENCOUNTER — APPOINTMENT (OUTPATIENT)
Dept: RADIATION ONCOLOGY | Facility: HOSPITAL | Age: 72
End: 2022-07-08
Payer: COMMERCIAL

## 2022-07-08 ENCOUNTER — APPOINTMENT (OUTPATIENT)
Dept: RADIATION ONCOLOGY | Facility: HOSPITAL | Age: 72
End: 2022-07-08
Attending: RADIOLOGY
Payer: COMMERCIAL

## 2022-07-08 ENCOUNTER — PATIENT OUTREACH (OUTPATIENT)
Dept: HEMATOLOGY ONCOLOGY | Facility: CLINIC | Age: 72
End: 2022-07-08

## 2022-07-08 ENCOUNTER — TELEPHONE (OUTPATIENT)
Dept: HEMATOLOGY ONCOLOGY | Facility: CLINIC | Age: 72
End: 2022-07-08

## 2022-07-08 ENCOUNTER — HOSPITAL ENCOUNTER (OUTPATIENT)
Dept: INFUSION CENTER | Facility: HOSPITAL | Age: 72
Discharge: HOME | End: 2022-07-08
Attending: INTERNAL MEDICINE
Payer: COMMERCIAL

## 2022-07-08 VITALS
BODY MASS INDEX: 22.03 KG/M2 | SYSTOLIC BLOOD PRESSURE: 138 MMHG | HEART RATE: 76 BPM | HEIGHT: 63 IN | RESPIRATION RATE: 20 BRPM | WEIGHT: 124.34 LBS | TEMPERATURE: 97 F | DIASTOLIC BLOOD PRESSURE: 60 MMHG

## 2022-07-08 DIAGNOSIS — C67.0 MALIGNANT NEOPLASM OF TRIGONE OF URINARY BLADDER (HCC): Primary | ICD-10-CM

## 2022-07-08 PROCEDURE — 96367 TX/PROPH/DG ADDL SEQ IV INF: CPT

## 2022-07-08 PROCEDURE — G6002 STEREOSCOPIC X-RAY GUIDANCE: HCPCS | Performed by: RADIOLOGY

## 2022-07-08 PROCEDURE — 96413 CHEMO IV INFUSION 1 HR: CPT

## 2022-07-08 PROCEDURE — 77412 RADIATION TX DELIVERY LVL 3: CPT | Performed by: RADIOLOGY

## 2022-07-08 PROCEDURE — 77387 GUIDANCE FOR RADJ TX DLVR: CPT | Performed by: RADIOLOGY

## 2022-07-08 RX ORDER — SODIUM CHLORIDE 9 MG/ML
20 INJECTION, SOLUTION INTRAVENOUS ONCE
Status: COMPLETED | OUTPATIENT
Start: 2022-07-08 | End: 2022-07-08

## 2022-07-08 RX ADMIN — DEXAMETHASONE SODIUM PHOSPHATE 10 MG: 10 INJECTION, SOLUTION INTRAMUSCULAR; INTRAVENOUS at 13:30

## 2022-07-08 RX ADMIN — GEMCITABINE 42.6 MG: 38 INJECTION, SOLUTION INTRAVENOUS at 13:59

## 2022-07-08 RX ADMIN — SODIUM CHLORIDE 20 ML/HR: 0.9 INJECTION, SOLUTION INTRAVENOUS at 13:30

## 2022-07-08 NOTE — PROGRESS NOTES
Outreach to pt to see how he is doing after first week of treatment  Pt states he is feeling good without any issues    Able to carry -on normal activities    Task sent to urology about follow-up

## 2022-07-08 NOTE — TELEPHONE ENCOUNTER
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 Thank you!

## 2022-07-11 ENCOUNTER — HOSPITAL ENCOUNTER (OUTPATIENT)
Dept: INFUSION CENTER | Facility: HOSPITAL | Age: 72
Discharge: HOME | End: 2022-07-11
Payer: COMMERCIAL

## 2022-07-11 ENCOUNTER — TELEPHONE (OUTPATIENT)
Dept: HEMATOLOGY ONCOLOGY | Facility: CLINIC | Age: 72
End: 2022-07-11

## 2022-07-11 ENCOUNTER — APPOINTMENT (OUTPATIENT)
Dept: RADIATION ONCOLOGY | Facility: HOSPITAL | Age: 72
End: 2022-07-11
Payer: COMMERCIAL

## 2022-07-11 ENCOUNTER — PATIENT OUTREACH (OUTPATIENT)
Dept: FAMILY MEDICINE CLINIC | Facility: CLINIC | Age: 72
End: 2022-07-11

## 2022-07-11 DIAGNOSIS — C67.0 MALIGNANT NEOPLASM OF TRIGONE OF URINARY BLADDER (HCC): Primary | ICD-10-CM

## 2022-07-11 DIAGNOSIS — Z95.828 PORT-A-CATH IN PLACE: ICD-10-CM

## 2022-07-11 LAB
ALBUMIN SERPL BCP-MCNC: 2.9 G/DL (ref 3.5–5)
ALP SERPL-CCNC: 80 U/L (ref 46–116)
ALT SERPL W P-5'-P-CCNC: 18 U/L (ref 12–78)
ANION GAP SERPL CALCULATED.3IONS-SCNC: 5 MMOL/L (ref 4–13)
AST SERPL W P-5'-P-CCNC: 19 U/L (ref 5–45)
BASOPHILS # BLD AUTO: 0.02 THOUSANDS/ΜL (ref 0–0.1)
BASOPHILS NFR BLD AUTO: 0 % (ref 0–1)
BILIRUB SERPL-MCNC: 0.32 MG/DL (ref 0.2–1)
BUN SERPL-MCNC: 41 MG/DL (ref 5–25)
CALCIUM ALBUM COR SERPL-MCNC: 9.3 MG/DL (ref 8.3–10.1)
CALCIUM SERPL-MCNC: 8.4 MG/DL (ref 8.3–10.1)
CHLORIDE SERPL-SCNC: 110 MMOL/L (ref 100–108)
CO2 SERPL-SCNC: 22 MMOL/L (ref 21–32)
CREAT SERPL-MCNC: 2.6 MG/DL (ref 0.6–1.3)
EOSINOPHIL # BLD AUTO: 0.12 THOUSAND/ΜL (ref 0–0.61)
EOSINOPHIL NFR BLD AUTO: 2 % (ref 0–6)
ERYTHROCYTE [DISTWIDTH] IN BLOOD BY AUTOMATED COUNT: 15.8 % (ref 11.6–15.1)
GFR SERPL CREATININE-BSD FRML MDRD: 23 ML/MIN/1.73SQ M
GLUCOSE SERPL-MCNC: 200 MG/DL (ref 65–140)
HCT VFR BLD AUTO: 27.8 % (ref 36.5–49.3)
HGB BLD-MCNC: 8.9 G/DL (ref 12–17)
IMM GRANULOCYTES # BLD AUTO: 0.05 THOUSAND/UL (ref 0–0.2)
IMM GRANULOCYTES NFR BLD AUTO: 1 % (ref 0–2)
LYMPHOCYTES # BLD AUTO: 1.11 THOUSANDS/ΜL (ref 0.6–4.47)
LYMPHOCYTES NFR BLD AUTO: 16 % (ref 14–44)
MCH RBC QN AUTO: 24.7 PG (ref 26.8–34.3)
MCHC RBC AUTO-ENTMCNC: 32 G/DL (ref 31.4–37.4)
MCV RBC AUTO: 77 FL (ref 82–98)
MONOCYTES # BLD AUTO: 0.05 THOUSAND/ΜL (ref 0.17–1.22)
MONOCYTES NFR BLD AUTO: 1 % (ref 4–12)
NEUTROPHILS # BLD AUTO: 5.47 THOUSANDS/ΜL (ref 1.85–7.62)
NEUTS SEG NFR BLD AUTO: 80 % (ref 43–75)
NRBC BLD AUTO-RTO: 0 /100 WBCS
PLATELET # BLD AUTO: 47 THOUSANDS/UL (ref 149–390)
PMV BLD AUTO: 11.4 FL (ref 8.9–12.7)
POTASSIUM SERPL-SCNC: 4.5 MMOL/L (ref 3.5–5.3)
PROT SERPL-MCNC: 7.5 G/DL (ref 6.4–8.2)
RBC # BLD AUTO: 3.6 MILLION/UL (ref 3.88–5.62)
SODIUM SERPL-SCNC: 137 MMOL/L (ref 136–145)
WBC # BLD AUTO: 6.82 THOUSAND/UL (ref 4.31–10.16)

## 2022-07-11 PROCEDURE — 77387 GUIDANCE FOR RADJ TX DLVR: CPT | Performed by: INTERNAL MEDICINE

## 2022-07-11 PROCEDURE — G6002 STEREOSCOPIC X-RAY GUIDANCE: HCPCS | Performed by: INTERNAL MEDICINE

## 2022-07-11 PROCEDURE — 77336 RADIATION PHYSICS CONSULT: CPT | Performed by: RADIOLOGY

## 2022-07-11 PROCEDURE — 80053 COMPREHEN METABOLIC PANEL: CPT | Performed by: INTERNAL MEDICINE

## 2022-07-11 PROCEDURE — 85025 COMPLETE CBC W/AUTO DIFF WBC: CPT | Performed by: INTERNAL MEDICINE

## 2022-07-11 PROCEDURE — 77412 RADIATION TX DELIVERY LVL 3: CPT | Performed by: INTERNAL MEDICINE

## 2022-07-11 NOTE — PROGRESS NOTES
CMOC: Received telephone call from Patients significant other  Jessica Mclain is emotionally hurt and concerned with the change in behavior that started about 3 days ago  Jessica Mclain states that the patient has become verbally aggressive with herself and daughter  Jessica Mclain states that she has been through serious health issues in the past with patient and has not had this experience  Jessica Mclain is also asking for help with patient  Jessica Mclain is doing all that she can but does not feel its enough and is overwhelmed    Jessica Mclain also stated that patient incontinent is increasing and he is just "Peeing" on himself all the time  [facility] listened and allowed patient to talk out  What she is feeling and going through  [facility] also advised patient to call Oncology and patient  Indira Yu  [facility] reassured her that we are her to help and that I will contact my Nurse  Frank Rae to help with the communication with oncology  [facility] asked patient to call me on Thrusday if she has not spoken with oncology  CM RN did follow up with Indira Yu, and hopes to meet with both patient and Jessica Mclain at his radiation appointment on 7/12/22

## 2022-07-11 NOTE — PROGRESS NOTES
Patient huddle with case management  Zohreh Macario, she received a phone call from patients wife  Wife is concerned as Delano Paget has had a change in behavior  Irritable  She is also experiencing health concerns of her own  Telephone call to Oncology Social Worker  She will call patient and wife and plan to meet with them at next infusion appointment  Pt remains closed to outpatient complex care management at this time

## 2022-07-11 NOTE — TELEPHONE ENCOUNTER
Hi   Would you be able to touch base with Estuardo Gale (significant other)  She would like to become patient's POA

## 2022-07-11 NOTE — TELEPHONE ENCOUNTER
Scoobybertha Shafer states pt is fatigued and weak and has "behavior changes"  She states he is up at night and can be argumentative at times  Wife states she needs help with symptoms  Follow up scheduled for 7/14

## 2022-07-11 NOTE — TELEPHONE ENCOUNTER
Returned call to BAKARI JOSEPH , she was questioning if pt would be receiving iron infusion  Advised Dr Clair Camacho would review the lab results and order if needed  She would also like to speak with someone about becoming patient's medical POA  Will reach out to social work for assistance

## 2022-07-11 NOTE — TELEPHONE ENCOUNTER
CALL RETURN FORM   Reason for patient call? Kike Buitrago don patients behalf stating he is at his treatment now and just hasn't been feeling good and would like to speak to a nurse     Patient's primary oncologist? Dr Craig Rojas   Name of person the patient was calling for? Silvia/Ebonie    Any additional information to add, if applicable? Best call back number is [phone]   Informed patient that the message will be forwarded to the team and someone will get back to them as soon as possible    Did you relay this information to the patient?  yes

## 2022-07-12 ENCOUNTER — HOSPITAL ENCOUNTER (OUTPATIENT)
Dept: INFUSION CENTER | Facility: HOSPITAL | Age: 72
Discharge: HOME | End: 2022-07-12
Attending: INTERNAL MEDICINE

## 2022-07-12 ENCOUNTER — APPOINTMENT (OUTPATIENT)
Dept: RADIATION ONCOLOGY | Facility: HOSPITAL | Age: 72
End: 2022-07-12
Attending: INTERNAL MEDICINE
Payer: COMMERCIAL

## 2022-07-12 ENCOUNTER — APPOINTMENT (OUTPATIENT)
Dept: RADIATION ONCOLOGY | Facility: HOSPITAL | Age: 72
End: 2022-07-12
Payer: COMMERCIAL

## 2022-07-12 ENCOUNTER — APPOINTMENT (OUTPATIENT)
Dept: RADIATION ONCOLOGY | Facility: HOSPITAL | Age: 72
End: 2022-07-12
Attending: RADIOLOGY
Payer: COMMERCIAL

## 2022-07-12 PROCEDURE — 77387 GUIDANCE FOR RADJ TX DLVR: CPT | Performed by: RADIOLOGY

## 2022-07-12 PROCEDURE — G6002 STEREOSCOPIC X-RAY GUIDANCE: HCPCS | Performed by: RADIOLOGY

## 2022-07-12 PROCEDURE — 77412 RADIATION TX DELIVERY LVL 3: CPT | Performed by: RADIOLOGY

## 2022-07-12 PROCEDURE — 77427 RADIATION TX MANAGEMENT X5: CPT | Performed by: RADIOLOGY

## 2022-07-12 NOTE — TELEPHONE ENCOUNTER
Called and spoke with Wilmer Rader  Per Wilmer Rader, she called IR and they helped patient with the bags  Informed Wilmer Rader that tube will stay in until radiation is complete

## 2022-07-13 ENCOUNTER — APPOINTMENT (OUTPATIENT)
Dept: RADIATION ONCOLOGY | Facility: HOSPITAL | Age: 72
End: 2022-07-13
Payer: COMMERCIAL

## 2022-07-13 PROCEDURE — 77412 RADIATION TX DELIVERY LVL 3: CPT | Performed by: INTERNAL MEDICINE

## 2022-07-13 PROCEDURE — 77387 GUIDANCE FOR RADJ TX DLVR: CPT | Performed by: INTERNAL MEDICINE

## 2022-07-13 PROCEDURE — G6002 STEREOSCOPIC X-RAY GUIDANCE: HCPCS | Performed by: INTERNAL MEDICINE

## 2022-07-14 ENCOUNTER — PATIENT OUTREACH (OUTPATIENT)
Dept: HEMATOLOGY ONCOLOGY | Facility: CLINIC | Age: 72
End: 2022-07-14

## 2022-07-14 ENCOUNTER — APPOINTMENT (OUTPATIENT)
Dept: RADIATION ONCOLOGY | Facility: HOSPITAL | Age: 72
End: 2022-07-14
Payer: COMMERCIAL

## 2022-07-14 ENCOUNTER — OFFICE VISIT (OUTPATIENT)
Dept: HEMATOLOGY ONCOLOGY | Facility: CLINIC | Age: 72
End: 2022-07-14
Payer: COMMERCIAL

## 2022-07-14 VITALS
OXYGEN SATURATION: 99 % | HEIGHT: 62 IN | WEIGHT: 124 LBS | RESPIRATION RATE: 17 BRPM | SYSTOLIC BLOOD PRESSURE: 130 MMHG | DIASTOLIC BLOOD PRESSURE: 62 MMHG | TEMPERATURE: 97 F | BODY MASS INDEX: 22.82 KG/M2 | HEART RATE: 77 BPM

## 2022-07-14 DIAGNOSIS — Z79.4 CONTROLLED TYPE 2 DIABETES MELLITUS WITH DIABETIC NEUROPATHY, WITH LONG-TERM CURRENT USE OF INSULIN (HCC): ICD-10-CM

## 2022-07-14 DIAGNOSIS — E11.40 CONTROLLED TYPE 2 DIABETES MELLITUS WITH DIABETIC NEUROPATHY, WITH LONG-TERM CURRENT USE OF INSULIN (HCC): ICD-10-CM

## 2022-07-14 DIAGNOSIS — C67.0 MALIGNANT NEOPLASM OF TRIGONE OF URINARY BLADDER (HCC): Primary | ICD-10-CM

## 2022-07-14 PROCEDURE — 77387 GUIDANCE FOR RADJ TX DLVR: CPT | Performed by: INTERNAL MEDICINE

## 2022-07-14 PROCEDURE — G6002 STEREOSCOPIC X-RAY GUIDANCE: HCPCS | Performed by: INTERNAL MEDICINE

## 2022-07-14 PROCEDURE — 77412 RADIATION TX DELIVERY LVL 3: CPT | Performed by: INTERNAL MEDICINE

## 2022-07-14 PROCEDURE — 99214 OFFICE O/P EST MOD 30 MIN: CPT | Performed by: INTERNAL MEDICINE

## 2022-07-14 RX ORDER — LANCING DEVICE
EACH MISCELLANEOUS
Qty: 1 EACH | Refills: 0 | Status: SHIPPED | OUTPATIENT
Start: 2022-07-14

## 2022-07-14 RX ORDER — BLOOD-GLUCOSE CONTROL, NORMAL
EACH MISCELLANEOUS
Qty: 1 EACH | Refills: 0 | Status: SHIPPED | OUTPATIENT
Start: 2022-07-14

## 2022-07-14 NOTE — PROGRESS NOTES
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, coronary artery disease, nephrolithiasis was found to have gross hematuria in May 2022, cystoscopies showed at least 5 cm mass involving the right lateral posterior wall, ureterovesical junction on the trigone, could not be stented, status post right nephrostomy tube     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     CT scan did not show any local or distant metastasis  Because of liver transplant we felt he is not a candidate for immunotherapy, he was treated with concurrent radiation therapy with gemcitabine 27 mg per m2 twice weekly, complicated with dysuria, fatigue    After long discussion the patient and his wife decided not to proceed with any additional gemcitabine at this time, he will continue with radiation therapy and will follow up in 3 months with CT scan of the abdomen and pelvis        Labs and imaging studies are reviewed by ordering provider once results are available  If there are findings that need immediate attention, you will be contacted when results available  Discussing results and the implication on your healthcare is best discussed in person at your follow-up visit         HPI:    68-year-old  male with complicated medical history including history of hepatitis-C, cirrhosis of the liver with subsequent liver transplant in 2010, diabetes mellitus type 2, diabetic neuropathy, benign paroxysmal positional vertigo, subdural hygroma, chronic kidney disease grade 3-4, history of previous alcoholism, presented with gross hematuria on May 2022 subsequently CT scan of the abdomen and pelvis showed moderate right-sided hydroureteronephrosis with ureteral calculus seen, bladder mass measuring 7 4 x 6 6 cm was possible soft tissue extension into the right ureterovesical junction  Status post cystoscopy with 5 cm mass involving the right side as well as the trigone unable to stand the right-sided hydronephrosis status post nephrostomy tube biopsy showed 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, he was not candidate for surgical resection because of comorbidities, liver transplant as well as advanced age      The patient felt to be candidate for concurrent radiation with low-dose gemcitabine  Interval History:    Fatigued tired, bladder spasm with dysuria    Previous Treatment:         Test Results:    Imaging: IR port placement    Result Date: 6/23/2022  Narrative: IMAGE-GUIDED PORT PLACEMENT Indication: Long-term intravenous access for chemotherapy  ( bladder cancer ) Technique: The right neck was prepped and draped in sterile fashion  All elements of maximal sterile barrier technique were followed (cap, mask, sterile gown, sterile gloves, large sterile sheet, hand hygiene, and 2% chlorhexidine for cutaneous antisepsis)  Sterile probe cover and sterile gel were also utilized  1% lidocaine with epinephrine was used as local anesthetic and conscious sedation was administered  The right internal jugular vein was punctured using a 21 gauge needle using direct sonographic guidance  A static sonographic image demonstrating needle entry was saved  A guidewire was advanced into the inferior vena cava through an exchange dilator  The 9 Wolof peel-away sheath was placed  A port pocket was created approximately 5 cm below the mid right clavicle using sharp and blunt dissection  The 8 Wolof port catheter was tunneled to the venotomy site and advanced through the peel-away sheath into the right atrium   A single-lumen [facility] Digni power-injectable implantable venous port was attached to the catheter and secured in the pocket using a single 3-0 Vicryl suture  The port pocket was closed with deep interrupted 4-0 Vicryl and subcuticular Statifix  Exofin was applied over the port incision and venotomy sites  The port was primed with heparinized saline and a sterile dressing was applied  Fluoroscopic guidance was utilized for catheter placement  Sedation: 30 minutes Fluoroscopy time:  6 minutes Images: 2 Findings: The right internal jugular vein was assessed as a possible access site by ultrasound  The right internal jugular vein is patent and fully compressible by limited ultrasound  The tip of the port catheter terminates in the upper right atrium  The  port flushes and aspirates blood easily  Impression: Impression: Successful image-guided placement of right chest wall port  Workstation performed: [id number]       Labs:   Lab Results   Component Value Date    WBC 6 82 07/11/2022    HGB 8 9 (L) 07/11/2022    HCT 27 8 (L) 07/11/2022    MCV 77 (L) 07/11/2022    PLT 47 (LL) 07/11/2022     Lab Results   Component Value Date     12/17/2015    K 4 5 07/11/2022     (H) 07/11/2022    CO2 22 07/11/2022    ANIONGAP 13 12/17/2015    BUN 41 (H) 07/11/2022    CREATININE 2 60 (H) 07/11/2022    GLUCOSE 145 (H) 05/23/2021    GLUF 71 04/05/2022    CALCIUM 8 4 07/11/2022    CORRECTEDCA 9 3 07/11/2022    AST 19 07/11/2022    ALT 18 07/11/2022    ALKPHOS 80 07/11/2022    PROT 7 7 12/15/2015    BILITOT 0 51 12/15/2015    EGFR 23 07/11/2022       Lab Results   Component Value Date    IRON 30 (L) 07/05/2022    TIBC 182 (L) 07/05/2022    FERRITIN 343 07/05/2022       Lab Results   Component Value Date    QVTKAXTG59 904 (H) 12/16/2014         ROS: Review of Systems   Constitutional: Negative  Negative for appetite change, chills, diaphoresis, fatigue, fever and unexpected weight change     HENT:   Negative for hearing loss, lump/mass, mouth sores, nosebleeds, sore throat, trouble swallowing and voice change  Eyes: Negative  Negative for eye problems and icterus  Respiratory: Negative  Negative for chest tightness, cough, hemoptysis and shortness of breath  Cardiovascular: Negative for chest pain and leg swelling  Gastrointestinal: Negative for abdominal distention, abdominal pain, blood in stool, constipation, diarrhea and nausea  Endocrine: Negative  Genitourinary: Positive for difficulty urinating, dysuria and frequency  Negative for hematuria and pelvic pain  Musculoskeletal: Negative  Negative for arthralgias, back pain, flank pain, gait problem, myalgias and neck stiffness  Skin: Negative for itching and rash  Neurological: Negative for dizziness, gait problem, headaches, light-headedness, numbness and speech difficulty  Hematological: Negative for adenopathy  Does not bruise/bleed easily  Psychiatric/Behavioral: Negative for confusion, decreased concentration, depression and sleep disturbance  The patient is not nervous/anxious  Current Medications: Reviewed  Allergies: Reviewed  PMH/FH/SH:  Reviewed      Physical Exam:    Body surface area is 1 56 meters squared  Wt Readings from Last 3 Encounters:   07/14/22 56 2 kg (124 lb)   07/08/22 56 4 kg (124 lb 5 4 oz)   07/05/22 57 7 kg (127 lb 3 3 oz)        Temp Readings from Last 3 Encounters:   07/14/22 (!) 97 °F (36 1 °C)   07/08/22 (!) 97 °F (36 1 °C)   07/05/22 97 6 °F (36 4 °C) (Temporal)        BP Readings from Last 3 Encounters:   07/14/22 130/62   07/08/22 138/60   07/05/22 142/67         Pulse Readings from Last 3 Encounters:   07/14/22 77   07/08/22 76   07/05/22 70        Physical Exam  Vitals reviewed  Constitutional:       General: He is not in acute distress  Appearance: He is well-developed  He is not diaphoretic  HENT:      Head: Normocephalic and atraumatic     Eyes:      Conjunctiva/sclera: Conjunctivae normal    Neck:      Trachea: No tracheal deviation  Cardiovascular:      Rate and Rhythm: Normal rate and regular rhythm  Heart sounds: No murmur heard  No friction rub  No gallop  Pulmonary:      Effort: Pulmonary effort is normal  No respiratory distress  Breath sounds: Normal breath sounds  No wheezing or rales  Chest:      Chest wall: No tenderness  Abdominal:      General: There is no distension  Palpations: Abdomen is soft  Tenderness: There is no abdominal tenderness  Musculoskeletal:      Cervical back: Normal range of motion and neck supple  Lymphadenopathy:      Cervical: No cervical adenopathy  Skin:     General: Skin is warm and dry  Coloration: Skin is not pale  Findings: No erythema  Neurological:      Mental Status: He is alert and oriented to person, place, and time  Psychiatric:         Behavior: Behavior normal          Thought Content: Thought content normal          Judgment: Judgment normal          ECO    Goals and Barriers:  Current Goal: Minimize effects of disease  Barriers: None  Patient's Capacity to Self Care:  Patient is able to self care      Code Status: [unfilled]

## 2022-07-14 NOTE — PROGRESS NOTES
MSW made outreach to pt's wife this day and she was open and receptive of this call  Gray Heimlich expressed her concerns with the pt since he started his treatment and how she is feeling overwhelmed  A great deal of time was spent listening and offering support  The pt has been having periods of incontinence and this is a struggle as Gray Heimlich is doing more laundry than usual and feels the pt needs to wear a brief  Since they are on a fixed income, she states it is difficult for her to purchase the briefs  MSW will look into the pt's eligibility for the waiver program to learn if this would be a covered expense for the pt  Gray Heimlich spent time talking about the pt's health with his cancer and was tearful on the phone as she compared this to the journey they went through when the pt had his transplant  She explained that in addition to the 2 daughter they have together, the pt has 5 additional children  Gray Heimlich states that his other children are not always in agreement with the pt's medical decisions and she worries what would happen if the pt were not able to make his own decisions  MSW expressed the importance of AD and she was familiar with the 5 Wishes as she had gone to a workshop on this at her [facility]  MSW will provide Gray Heimlich with 3 copies of the 5 Wishes, [place] when they come in for their Med Onc appointment later this day  Other areas of concern are the pt's behavior as she described him as wanting to go outside on hot days when Gray Heimlich feels he should be in the air conditioning  She explained how they have been "arguing daily" over this  MSW offered education on how pt's can feel cold when they go through treatment and the air may be too much for him  MSW encouraged spouse to allow the pt to have some time outside without arguing with him as this would be beneficial for both of them  She agreed to try this    Another argument with the pt's lack of eating and this was again attributed to a side effect of treatment  As Lee Lynch learned that the pt's behavior was seemingly a result of how he was feeling due to treatment, she became more understanding and less angry and anxious  She did share that she suffers from anxiety and depression and has a therapist that she sees routinely  MSW will plan to refer the pt for the waiver program and refer to [facility] for additional financial assistance  Lacykevin Lynch was in agreement with this plan  Significant support was offered, contact number was provided and she was encouraged to call as needed

## 2022-07-15 ENCOUNTER — APPOINTMENT (OUTPATIENT)
Dept: RADIATION ONCOLOGY | Facility: HOSPITAL | Age: 72
End: 2022-07-15
Payer: COMMERCIAL

## 2022-07-15 ENCOUNTER — HOSPITAL ENCOUNTER (OUTPATIENT)
Dept: INFUSION CENTER | Facility: HOSPITAL | Age: 72
Discharge: HOME | End: 2022-07-15
Attending: INTERNAL MEDICINE

## 2022-07-15 ENCOUNTER — APPOINTMENT (OUTPATIENT)
Dept: RADIATION ONCOLOGY | Facility: HOSPITAL | Age: 72
End: 2022-07-15
Attending: RADIOLOGY
Payer: COMMERCIAL

## 2022-07-15 PROCEDURE — 77412 RADIATION TX DELIVERY LVL 3: CPT | Performed by: INTERNAL MEDICINE

## 2022-07-15 PROCEDURE — 77387 GUIDANCE FOR RADJ TX DLVR: CPT | Performed by: INTERNAL MEDICINE

## 2022-07-15 PROCEDURE — G6002 STEREOSCOPIC X-RAY GUIDANCE: HCPCS | Performed by: INTERNAL MEDICINE

## 2022-07-18 ENCOUNTER — DOCUMENTATION (OUTPATIENT)
Dept: HEMATOLOGY ONCOLOGY | Facility: CLINIC | Age: 72
End: 2022-07-18

## 2022-07-18 ENCOUNTER — APPOINTMENT (OUTPATIENT)
Dept: RADIATION ONCOLOGY | Facility: HOSPITAL | Age: 72
End: 2022-07-18
Payer: COMMERCIAL

## 2022-07-18 ENCOUNTER — TELEPHONE (OUTPATIENT)
Dept: RADIATION ONCOLOGY | Facility: HOSPITAL | Age: 72
End: 2022-07-18

## 2022-07-18 ENCOUNTER — APPOINTMENT (OUTPATIENT)
Dept: LAB | Facility: HOSPITAL | Age: 72
End: 2022-07-18
Attending: RADIOLOGY
Payer: COMMERCIAL

## 2022-07-18 DIAGNOSIS — R30.0 DYSURIA: Primary | ICD-10-CM

## 2022-07-18 DIAGNOSIS — R30.0 DYSURIA: ICD-10-CM

## 2022-07-18 DIAGNOSIS — C67.0 MALIGNANT NEOPLASM OF TRIGONE OF URINARY BLADDER (HCC): Primary | ICD-10-CM

## 2022-07-18 LAB
BACTERIA UR QL AUTO: ABNORMAL /HPF
BILIRUB UR QL STRIP: NEGATIVE
CLARITY UR: CLEAR
COLOR UR: ABNORMAL
GLUCOSE UR STRIP-MCNC: NEGATIVE MG/DL
HGB UR QL STRIP.AUTO: NEGATIVE
KETONES UR STRIP-MCNC: NEGATIVE MG/DL
LEUKOCYTE ESTERASE UR QL STRIP: ABNORMAL
NITRITE UR QL STRIP: NEGATIVE
NON-SQ EPI CELLS URNS QL MICRO: ABNORMAL /HPF
PH UR STRIP.AUTO: 5.5 [PH]
PROT UR STRIP-MCNC: ABNORMAL MG/DL
RBC #/AREA URNS AUTO: ABNORMAL /HPF
SP GR UR STRIP.AUTO: 1.01 (ref 1–1.03)
UROBILINOGEN UR STRIP-ACNC: <2 MG/DL
WBC #/AREA URNS AUTO: ABNORMAL /HPF

## 2022-07-18 PROCEDURE — 81001 URINALYSIS AUTO W/SCOPE: CPT

## 2022-07-18 PROCEDURE — 87086 URINE CULTURE/COLONY COUNT: CPT

## 2022-07-18 PROCEDURE — G6002 STEREOSCOPIC X-RAY GUIDANCE: HCPCS | Performed by: INTERNAL MEDICINE

## 2022-07-18 PROCEDURE — 77336 RADIATION PHYSICS CONSULT: CPT | Performed by: RADIOLOGY

## 2022-07-18 PROCEDURE — 77412 RADIATION TX DELIVERY LVL 3: CPT | Performed by: INTERNAL MEDICINE

## 2022-07-18 PROCEDURE — 77387 GUIDANCE FOR RADJ TX DLVR: CPT | Performed by: INTERNAL MEDICINE

## 2022-07-18 NOTE — TELEPHONE ENCOUNTER
PT girlfriend Fabrice Griffin called ans said Chandra Allen is having burning sensation when he urinates and is also painful  They saw Alphonzo Dancer on Friday and he said to give us a call  Please call her at [phone]

## 2022-07-18 NOTE — PROGRESS NOTES
Chemotherapy has been discontinued by Dr Damari Ozuna due to tolerance and performance status  Pt will complete radiation alone on 8/23/2022  CT abd/pelvis scheduled for 10/14/22  Per Dr Dveyn Qureshi, pt needs follow-up with cystoscopy 6 months after completion  Do you want CT chest added to imaging? Thank you!

## 2022-07-18 NOTE — TELEPHONE ENCOUNTER
C/o pain and burning on urination  Requesting treatment  Urinalysis and urine culture ordered  Instructed to submit specimen prior to starting medication    Will call back if medication not covered by insurance

## 2022-07-19 ENCOUNTER — APPOINTMENT (OUTPATIENT)
Dept: RADIATION ONCOLOGY | Facility: HOSPITAL | Age: 72
End: 2022-07-19
Payer: COMMERCIAL

## 2022-07-19 ENCOUNTER — APPOINTMENT (OUTPATIENT)
Dept: RADIATION ONCOLOGY | Facility: HOSPITAL | Age: 72
End: 2022-07-19
Attending: RADIOLOGY
Payer: COMMERCIAL

## 2022-07-19 PROCEDURE — 77412 RADIATION TX DELIVERY LVL 3: CPT | Performed by: STUDENT IN AN ORGANIZED HEALTH CARE EDUCATION/TRAINING PROGRAM

## 2022-07-19 PROCEDURE — 77427 RADIATION TX MANAGEMENT X5: CPT | Performed by: RADIOLOGY

## 2022-07-19 PROCEDURE — 77387 GUIDANCE FOR RADJ TX DLVR: CPT | Performed by: STUDENT IN AN ORGANIZED HEALTH CARE EDUCATION/TRAINING PROGRAM

## 2022-07-19 PROCEDURE — G6002 STEREOSCOPIC X-RAY GUIDANCE: HCPCS | Performed by: STUDENT IN AN ORGANIZED HEALTH CARE EDUCATION/TRAINING PROGRAM

## 2022-07-19 RX ORDER — PHENAZOPYRIDINE HYDROCHLORIDE 200 MG/1
200 TABLET, FILM COATED ORAL
Qty: 30 TABLET | Refills: 0 | OUTPATIENT
Start: 2022-07-19

## 2022-07-20 ENCOUNTER — TELEPHONE (OUTPATIENT)
Dept: OTHER | Facility: OTHER | Age: 72
End: 2022-07-20

## 2022-07-20 ENCOUNTER — PATIENT OUTREACH (OUTPATIENT)
Dept: FAMILY MEDICINE CLINIC | Facility: CLINIC | Age: 72
End: 2022-07-20

## 2022-07-20 ENCOUNTER — TELEPHONE (OUTPATIENT)
Dept: UROLOGY | Facility: CLINIC | Age: 72
End: 2022-07-20

## 2022-07-20 ENCOUNTER — APPOINTMENT (OUTPATIENT)
Dept: RADIATION ONCOLOGY | Facility: HOSPITAL | Age: 72
End: 2022-07-20
Payer: COMMERCIAL

## 2022-07-20 DIAGNOSIS — N13.30 HYDRONEPHROSIS OF RIGHT KIDNEY: ICD-10-CM

## 2022-07-20 LAB — BACTERIA UR CULT: NORMAL

## 2022-07-20 RX ORDER — SODIUM CHLORIDE 9 MG/ML
10 INJECTION INTRAVENOUS DAILY
Qty: 300 ML | Refills: 2 | Status: SHIPPED | OUTPATIENT
Start: 2022-07-20 | End: 2022-10-10

## 2022-07-20 NOTE — TELEPHONE ENCOUNTER
Left message for patient that Dr Cha Higgins would like to see him on 1/20/23 at 1:30 at the [facility] location at [address], [address], [facility] location

## 2022-07-20 NOTE — TELEPHONE ENCOUNTER
Received call from Mariama Thomas RN for this patient  Patient has nephrostomy tube with saline flushes; need refill for saline flushes  Thu is requesting the order be called in and notify her  She will contact the family and let them know where to pick them up at  Please follow up with Thu

## 2022-07-20 NOTE — TELEPHONE ENCOUNTER
----- Message from Victor M Funez MD sent at 7/18/2022 12:23 PM EDT -----  I think CT chest would be advisable   Agree with cystoscopy within 6mos of treatment completion       ----- Message -----  From: Hal Bautista RN  Sent: 7/18/2022  11:29 AM EDT  To: Victor M Funez MD, #

## 2022-07-20 NOTE — PROGRESS NOTES
CMOC: Received a telephone call today from King Peoples  She stated that John Ying is doing ok, and has more energy since stopping Chemo  King Peoples was calling to inquire about when John Ying will receive his MA Card and Snap Benefits  As to date nothing has come in the mail  [facility] provided King Peoples with the phone number for [facility] office along with the case number  She is going to call them today  Anais Specking that John Ying has not received any Supplement drinks or Mens depends diapers  [facility] also provided her with Dayana Zarate telephone number so she can contact her reguarding the depends and supplement drinks

## 2022-07-21 ENCOUNTER — CONSULT (OUTPATIENT)
Dept: PALLIATIVE MEDICINE | Facility: CLINIC | Age: 72
End: 2022-07-21
Payer: COMMERCIAL

## 2022-07-21 ENCOUNTER — APPOINTMENT (OUTPATIENT)
Dept: RADIATION ONCOLOGY | Facility: HOSPITAL | Age: 72
End: 2022-07-21
Attending: RADIOLOGY
Payer: COMMERCIAL

## 2022-07-21 ENCOUNTER — APPOINTMENT (OUTPATIENT)
Dept: RADIATION ONCOLOGY | Facility: HOSPITAL | Age: 72
End: 2022-07-21
Payer: COMMERCIAL

## 2022-07-21 VITALS
DIASTOLIC BLOOD PRESSURE: 60 MMHG | WEIGHT: 126.76 LBS | HEART RATE: 70 BPM | SYSTOLIC BLOOD PRESSURE: 127 MMHG | OXYGEN SATURATION: 99 % | TEMPERATURE: 97.3 F | BODY MASS INDEX: 23.19 KG/M2

## 2022-07-21 DIAGNOSIS — K52.1 CHEMOTHERAPY-INDUCED DIARRHEA: Primary | ICD-10-CM

## 2022-07-21 DIAGNOSIS — Z94.4 LIVER TRANSPLANT STATUS (HCC): ICD-10-CM

## 2022-07-21 DIAGNOSIS — T45.1X5A CHEMOTHERAPY-INDUCED DIARRHEA: Primary | ICD-10-CM

## 2022-07-21 DIAGNOSIS — N34.2 URETHRITIS: ICD-10-CM

## 2022-07-21 DIAGNOSIS — Z79.4 TYPE 2 DIABETES MELLITUS WITH DIABETIC PERIPHERAL ANGIOPATHY WITHOUT GANGRENE, WITH LONG-TERM CURRENT USE OF INSULIN (HCC): ICD-10-CM

## 2022-07-21 DIAGNOSIS — C67.0 MALIGNANT NEOPLASM OF TRIGONE OF URINARY BLADDER (HCC): ICD-10-CM

## 2022-07-21 DIAGNOSIS — E11.51 TYPE 2 DIABETES MELLITUS WITH DIABETIC PERIPHERAL ANGIOPATHY WITHOUT GANGRENE, WITH LONG-TERM CURRENT USE OF INSULIN (HCC): ICD-10-CM

## 2022-07-21 PROCEDURE — 1160F RVW MEDS BY RX/DR IN RCRD: CPT | Performed by: FAMILY MEDICINE

## 2022-07-21 PROCEDURE — G6002 STEREOSCOPIC X-RAY GUIDANCE: HCPCS | Performed by: RADIOLOGY

## 2022-07-21 PROCEDURE — 99204 OFFICE O/P NEW MOD 45 MIN: CPT | Performed by: FAMILY MEDICINE

## 2022-07-21 PROCEDURE — 77387 GUIDANCE FOR RADJ TX DLVR: CPT | Performed by: RADIOLOGY

## 2022-07-21 PROCEDURE — 77412 RADIATION TX DELIVERY LVL 3: CPT | Performed by: RADIOLOGY

## 2022-07-21 RX ORDER — PHENAZOPYRIDINE HYDROCHLORIDE 100 MG/1
100 TABLET, FILM COATED ORAL 3 TIMES WEEKLY
Qty: 3 TABLET | Refills: 0 | Status: SHIPPED | OUTPATIENT
Start: 2022-07-22 | End: 2022-09-12

## 2022-07-21 RX ORDER — LOPERAMIDE HYDROCHLORIDE 2 MG/1
2 CAPSULE ORAL 2 TIMES DAILY PRN
Qty: 20 CAPSULE | Refills: 0 | Status: SHIPPED | OUTPATIENT
Start: 2022-07-21 | End: 2022-08-26 | Stop reason: SDUPTHER

## 2022-07-21 NOTE — PROGRESS NOTES
Outpatient Consultation - Palliative and Supportive Care   Sam Card 74 y o  male [id number]    Assessment & Plan  Problem List Items Addressed This Visit     Liver transplant status ([facility] [address] ) (Chronic)    Malignant neoplasm of trigone of urinary bladder (HCC)    Relevant Medications    phenazopyridine (PYRIDIUM) 100 mg tablet (Start on 7/22/2022)    Type 2 diabetes mellitus with diabetic peripheral angiopathy without gangrene, with long-term current use of insulin (HCC)      Other Visit Diagnoses     Chemotherapy-induced diarrhea    -  Primary    Relevant Medications    loperamide (IMODIUM) 2 mg capsule    Urethritis        Relevant Medications    phenazopyridine (PYRIDIUM) 100 mg tablet (Start on 7/22/2022)          Medications adjusted this encounter:  Requested Prescriptions     Signed Prescriptions Disp Refills    phenazopyridine (PYRIDIUM) 100 mg tablet 3 tablet 0     Sig: Take 1 tablet (100 mg total) by mouth 3 (three) times a week [place] en viernes, en lunes, en miercoles, y despues quitalo    loperamide (IMODIUM) 2 mg capsule 20 capsule 0     Sig: Take 1 capsule (2 mg total) by mouth 2 (two) times a day as needed for diarrhea [employer] por cynthia si tiene diarrea     No orders of the defined types were placed in this encounter  There are no discontinued medications  - Trial low dose Pyridium, adjusted for renal dysfunction  - Will consider steroids should we be able to reach his [facility] Xplant team   - Five Wishes to be addressed in future using SW and skilled interp  PPS: [address] was seen today for symptoms and planning cares related to above illnesses  Above orders were sent electronically, or provided in hardcopy in clinic  I have reviewed the patient's controlled substance dispensing history in the Prescription Drug Monitoring Program in compliance with the [facility] regulations before prescribing any controlled substances      We appreciate the referral, and wish for him to continue to follow with us  If there are questions or concerns, please contact us through our clinic/answering service 24 hours a day, seven days a week  Ranjana Rueda MD  [facility] Palliative and Supportive Care  [phone]        Visit Information    Accompanied By: No one    Source of History: Self    History Limitations: Language Barrier, somewhat circumvented by my own Estonian skills  Contacts:    History of Present Illness      Elzie Buerger is a 74 y o  male who presents as a referral from Dr Lisa Kendrick of Med/Onc for primary palliative diagnosis of TCC of bladder  Consultation is requested for: symptom management, disease process education and discussion of prognosis, advance care planning, emotional support in the setting of serious illness  D/t language barrier, pt has endorsesd some confusion over he status and nature of his cancer  We reviewed that CT scans and staging w/up has revealed no distant mets, and that local XRT and chemo would be bladder-sparing approach  Pt has noted side effects of chemo, and decided against further rounds  XRT alone will continue, as per discuseion with Dr Lisa Kendrick  Pt continues to have abd pain and dysuria  Otherwise feeling decently well  Wishes to have his partner Maya Moralez make decision in case of emergency -- they have been together for 33+ years  He has several children, but trusts Maya Moralez over all of them  Pertinent Palliative Care Domains    Physical Symptoms:ambulates    Psychological Symptoms:mild anxiety, limited insight    Social Aspects: support system largely relies on his partner who is also ill        Spiritual Aspects: not addressed    Historical Data  Past Medical History:   Diagnosis Date    Alcoholism ([facility] )     Cerebral artery aneurysm     Change in mental state     last assessed 5/18/15; resolved 10/27/15    Diabetes mellitus ([facility] )     Drug dependence ([facility] )     Fatigue     last assessed 1/26/15; resolved 5/24/16    Hepatitis [address] discharge follow-up 12/21/2018    Kidney disease     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[facility] [address] )     [facility] transplant recipient [facility])     Renal disorder     Subdural hygroma     2/27/14; resolved 7/28/15    Thrombocytopenia ([facility] ) 9/20/2017     Past Surgical History:   Procedure Laterality Date    BRAIN SURGERY  02/12/2014    left frontotemporal 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    CATARACT EXTRACTION      CHOLECYSTECTOMY      FL LUMBAR PUNCTURE DIAGNOSTIC  12/14/2018    IR NEPHROSTOMY TUBE PLACEMENT  5/28/2022    IR PICC LINE  12/14/2018    IR PORT PLACEMENT  6/23/2022    LIVER TRANSPLANTATION      ROTATOR CUFF REPAIR      SHOULDER SURGERY      TRANSURETHRAL RESECTION OF BLADDER TUMOR N/A 5/26/2022    Procedure: TRANSURETHRAL RESECTION OF BLADDER TUMOR (TURBT);   Surgeon: Fidel Meehan MD;  Location: [facility];  Service: Urology     Social History     Socioeconomic History    Marital status: /Civil Union     Spouse name: Not on file    Number of children: Not on file    Years of education: less then high school    Highest education level: Not on file   Occupational History    Occupation: physical disability    Tobacco Use    Smoking status: Never Smoker    Smokeless tobacco: Never Used    Tobacco comment: former smoker per allscripts    Vaping Use    Vaping Use: Never used   Substance and Sexual Activity    Alcohol use: Not Currently    Drug use: No     Comment: remotely quit drug use per allscripts     Sexual activity: Never   Other Topics Concern    Not on file   Social History Narrative    Caffeine use     Living with significant other , girlfriend and daughter      Social Determinants of Health     Financial Resource Strain: Not on file   Food Insecurity: No Food Insecurity    Worried About Running Out of Food in the Last Year: Never true    [address] in the Last Year: Never true   Transportation Needs: Unknown    Lack of Transportation (Medical): Not on file    Lack of Transportation (Non-Medical):  No   Physical Activity: Not on file   Stress: Not on file   Social Connections: Not on file   Intimate Partner Violence: Not on file   Housing Stability: Low Risk     Unable to Pay for Housing in the Last Year: No    Number of Places Lived in the Last Year: 1    Unstable Housing in the Last Year: No     Family History   Problem Relation Age of Onset    Hypertension Mother         benign essential     Lung cancer Father     Diabetes Sister     Cancer Brother     Stroke Other         cva - due to embolism of cerebra artery      Allergies   Allergen Reactions    Tequin [Gatifloxacin] Rash    Ciprofloxacin     Iv Contrast [Iodinated Diagnostic Agents]     Levofloxacin Rash    Lipitor [Atorvastatin] Rash     Rash and itching    Omnipaque [Iohexol]      Current Outpatient Medications   Medication Sig Dispense Refill    acetaminophen (TYLENOL) 500 mg tablet Take 1 tablet (500 mg total) by mouth every 6 (six) hours as needed for mild pain 30 tablet 0    loperamide (IMODIUM) 2 mg capsule Take 1 capsule (2 mg total) by mouth 2 (two) times a day as needed for diarrhea [employer] carmen tableta dos veces por cynthia si tiene diarrea 20 capsule 0    [START ON 7/22/2022] phenazopyridine (PYRIDIUM) 100 mg tablet Take 1 tablet (100 mg total) by mouth 3 (three) times a week [place] en viernes, en lunes, en miercoles, y despues quitalo 3 tablet 0    temazepam (RESTORIL) 30 mg capsule TAKE 1 CAPSULE (30 MG TOTAL) BY MOUTH DAILY AT BEDTIME 30 capsule 5    zolpidem (AMBIEN) 10 mg tablet Take 1 tablet (10 mg total) by mouth daily at bedtime 30 tablet 5    Blood Glucose Calibration (OT ULTRA/FASTTK CNTRL SOLN) SOLN USE AS DIRECTED 1 each 0    clopidogrel (PLAVIX) 75 mg tablet Take 1 tablet (75 mg total) by mouth daily 90 tablet 3    Comfort EZ Pen Needles 32G X 4 MM MISC USE 3-4 AS DIRECTED 100 each 5    Continuous Blood Gluc  (FreeStyle Annita Hari 14 Day [place]) NATALIA Use 1 Device continuous 1 each 0    Continuous Blood Gluc Sensor (FreeStyle Frank 14 Day Sensor) MISC Use 1 Device 4 (four) times a day 1 each 5    Incontinence Supply Disposable (Incontinence Brief Medium) MISC Use 4 (four) times a day 120 each 5    insulin detemir (Levemir FlexTouch) 100 Units/mL injection pen Inject 18 Units under the skin daily at bedtime 15 mL 0    INSULIN SYRINGE 1CC/29G 29G X 1/2" 1 ML MISC by Does not apply route      Lancet Devices (Lancing Device) MISC USE AS DIRECTED 1 each 0    Lancets MISC by Does not apply route      Nutritional Supplements (Glucerna Shake) LIQD Take 1 Bottle by mouth 3 (three) times a day 48120 mL 3    ondansetron (ZOFRAN) 4 mg tablet Take 1 tablet (4 mg total) by mouth every 8 (eight) hours as needed for nausea or vomiting (Patient not taking: Reported on 7/21/2022) 20 tablet 1    OneTouch Ultra test strip TEST UP TO 3 TIMES A  each 0    pregabalin (LYRICA) 50 mg capsule TAKE 1 CAPSULE (50 MG TOTAL) BY MOUTH 3 (THREE) TIMES A DAY 90 capsule 5    rosuvastatin (CRESTOR) 40 MG tablet TAKE ONE (1) TABLET BY MOUTH DAILY 30 tablet 5    sodium chloride, PF, 0 9 % 10 mL by Intracatheter route daily Intracatheter flushing daily  May substitute prefilled syringe with normal saline 10 mL vials, 10 mL syringes, and 18 g blunt needles 300 mL 2    tacrolimus (PROGRAF) 1 mg capsule Take 1 capsule (1 mg total) by mouth every 12 (twelve) hours 180 capsule 3     No current facility-administered medications for this visit  Review of Systems   Constitutional: Positive for decreased appetite  Negative for weight gain and weight loss  HENT: Negative for hoarse voice and nosebleeds  Eyes: Negative for vision loss in left eye and vision loss in right eye  Cardiovascular: Negative for chest pain and dyspnea on exertion  Respiratory: Negative for cough and shortness of breath      Endocrine: Negative for polydipsia, polyphagia and polyuria  Skin: Negative for flushing and itching  Musculoskeletal: Negative for falls  Gastrointestinal: Positive for nausea  Negative for anorexia, jaundice and vomiting  Genitourinary: Positive for dysuria, frequency and hesitancy  Neurological: Negative for dizziness  Psychiatric/Behavioral: Negative for depression and memory loss  The patient is not nervous/anxious  Vital Signs    /60 (BP Location: Left arm, Patient Position: Sitting, Cuff Size: Standard)   Pulse 70   Temp (!) 97 3 °F (36 3 °C) (Temporal)   Wt 57 5 kg (126 lb 12 2 oz)   SpO2 99%   BMI 23 19 kg/m²     Physical Exam and Objective Data  Physical Exam  Constitutional:       General: He is not in acute distress  Appearance: He is well-developed  He is not ill-appearing or diaphoretic  Comments: slender   HENT:      Head: Normocephalic and atraumatic  Right Ear: External ear normal       Left Ear: External ear normal    Eyes:      General:         Right eye: No discharge  Left eye: No discharge  Conjunctiva/sclera: Conjunctivae normal       Pupils: Pupils are equal, round, and reactive to light  Neck:      Trachea: No tracheal deviation  Cardiovascular:      Rate and Rhythm: Normal rate and regular rhythm  Pulmonary:      Effort: Pulmonary effort is normal  No respiratory distress  Breath sounds: No stridor  Abdominal:      Palpations: Abdomen is soft  Musculoskeletal:         General: No deformity  Skin:     Coloration: Skin is not pale  Findings: No erythema or rash  Neurological:      General: No focal deficit present  Mental Status: He is alert and oriented to person, place, and time  Mental status is at baseline  Cranial Nerves: No cranial nerve deficit  Psychiatric:         Mood and Affect: Mood normal          Behavior: Behavior normal          Thought Content:  Thought content normal          Judgment: Judgment normal  Radiology and Laboratory:  I personally reviewed and interpreted the following results: reviewed historical data    40+ minutes was spent face to face with Hannah Duarte with greater than 50% of the time spent in counseling or coordination of care including discussions of etiology of diagnosis, instructions for disease self management, treatment instructions, follow up requirements and patient and family counseling/involvement in care  All of the patient's questions were answered during this discussion

## 2022-07-21 NOTE — Clinical Note
Would appreciate help of some or all of you to see this fellow again to complete Five Wishes / Shemar Late with him and his partner Roseann Salmeron

## 2022-07-22 ENCOUNTER — APPOINTMENT (OUTPATIENT)
Dept: RADIATION ONCOLOGY | Facility: HOSPITAL | Age: 72
End: 2022-07-22
Payer: COMMERCIAL

## 2022-07-22 ENCOUNTER — TELEPHONE (OUTPATIENT)
Dept: HEMATOLOGY ONCOLOGY | Facility: CLINIC | Age: 72
End: 2022-07-22

## 2022-07-22 ENCOUNTER — PATIENT OUTREACH (OUTPATIENT)
Dept: HEMATOLOGY ONCOLOGY | Facility: CLINIC | Age: 72
End: 2022-07-22

## 2022-07-22 DIAGNOSIS — R32 URINARY INCONTINENCE, UNSPECIFIED TYPE: ICD-10-CM

## 2022-07-22 DIAGNOSIS — C67.0 MALIGNANT NEOPLASM OF TRIGONE OF URINARY BLADDER (HCC): Primary | ICD-10-CM

## 2022-07-22 PROCEDURE — G6002 STEREOSCOPIC X-RAY GUIDANCE: HCPCS | Performed by: STUDENT IN AN ORGANIZED HEALTH CARE EDUCATION/TRAINING PROGRAM

## 2022-07-22 PROCEDURE — 77387 GUIDANCE FOR RADJ TX DLVR: CPT | Performed by: STUDENT IN AN ORGANIZED HEALTH CARE EDUCATION/TRAINING PROGRAM

## 2022-07-22 PROCEDURE — 77412 RADIATION TX DELIVERY LVL 3: CPT | Performed by: STUDENT IN AN ORGANIZED HEALTH CARE EDUCATION/TRAINING PROGRAM

## 2022-07-22 NOTE — PROGRESS NOTES
Pt's wife called Ivette and MSW returned call to pt this day  Pt's wife verbalized how she felt "unsupported" by the oncology team   MSW spent time listening and apologized that she was feeling this way  MSW asked Andrea Collazo what her concerns were and she was upset as she states that the VNA has stopped and she only has a few flushes remaining   She is concerned that she will run out and is not sure how she will obtain more  MSW asked her what VNA agency they had coming to the home as a call to the agency could be made to learn how to order these and Andrea Collazo did not know  MSW will need to reach out to the Nurse Navigator for further direction  Andrea Collazo was also upset that she has no additional funds to purchase adult briefs and the pt has incontinence in the evening  MSW will place a referral to Lindsay Tovar to request financial assistance for this item  Pt's wife then requesting assistance with ensure as she states she is not able to afford this  Outreach was made to [employer], RD for further direction on this  Pt's wife also asking about MA as she has not received any cards  It appears pt has been approved as per the chart and MSW will attempt to get the number  Significant support offered and MSW will continue to follow

## 2022-07-22 NOTE — PROGRESS NOTES
Outreach to pt's significant other regarding questions of where she should get Saline flushes for nephrostomy tube and also stated he has not had tube changed since placement  Reviewed chart and noted tube was placed in May and due for exchange in August   Order is in but has not been scheduled  Informed her I would call to schedule  Informed her flushes were ordered by the urology office and sent to [facility] but would call to check on status  Call to pharmacy and clarified script  They can deliver on Monday but if needed sooner it will need to be picked up    Timothy Koroma on this and she confirmed she received appt for tube change    IR Nephrostomy tube change scheduled for 8/31 and IR calling pt's SO

## 2022-07-22 NOTE — TELEPHONE ENCOUNTER
CALL RETURN FORM   Reason for patient call? Patient calling to speak with Linda Cordova  Informed patient that I spoke with Dahlia Abreusharon and she will be calling her back shortly  Patient verbalized understanding    Patient's primary oncologist? Dr Mark De La Rosa   Name of person the patient was calling for? Linda Cordova   Any additional information to add, if applicable? N/A   Informed patient that the message will be forwarded to the team and someone will get back to them as soon as possible    Did you relay this information to the patient?  Yes

## 2022-07-25 ENCOUNTER — PATIENT OUTREACH (OUTPATIENT)
Dept: HEMATOLOGY ONCOLOGY | Facility: CLINIC | Age: 72
End: 2022-07-25

## 2022-07-25 ENCOUNTER — APPOINTMENT (OUTPATIENT)
Dept: RADIATION ONCOLOGY | Facility: HOSPITAL | Age: 72
End: 2022-07-25
Payer: COMMERCIAL

## 2022-07-25 ENCOUNTER — TELEPHONE (OUTPATIENT)
Dept: SURGICAL ONCOLOGY | Facility: CLINIC | Age: 72
End: 2022-07-25

## 2022-07-25 ENCOUNTER — APPOINTMENT (OUTPATIENT)
Dept: RADIATION ONCOLOGY | Facility: HOSPITAL | Age: 72
End: 2022-07-25
Attending: RADIOLOGY
Payer: COMMERCIAL

## 2022-07-25 PROCEDURE — 77387 GUIDANCE FOR RADJ TX DLVR: CPT | Performed by: INTERNAL MEDICINE

## 2022-07-25 PROCEDURE — G6002 STEREOSCOPIC X-RAY GUIDANCE: HCPCS | Performed by: INTERNAL MEDICINE

## 2022-07-25 PROCEDURE — 77412 RADIATION TX DELIVERY LVL 3: CPT | Performed by: INTERNAL MEDICINE

## 2022-07-25 NOTE — TELEPHONE ENCOUNTER
Received voicemail from wife returning the call regarding appt scheduling  I called back and left her a voicemail letting her know 7/27 appt has been cancelled, as pt was just seen  Confirmed CT scan 10/14 and follow up 10/20

## 2022-07-26 ENCOUNTER — TELEPHONE (OUTPATIENT)
Dept: PALLIATIVE MEDICINE | Facility: CLINIC | Age: 72
End: 2022-07-26

## 2022-07-26 ENCOUNTER — APPOINTMENT (OUTPATIENT)
Dept: RADIATION ONCOLOGY | Facility: HOSPITAL | Age: 72
End: 2022-07-26
Payer: COMMERCIAL

## 2022-07-26 DIAGNOSIS — Z93.6 NEPHROSTOMY STATUS (HCC): Primary | ICD-10-CM

## 2022-07-26 PROCEDURE — 77336 RADIATION PHYSICS CONSULT: CPT | Performed by: RADIOLOGY

## 2022-07-26 PROCEDURE — 77412 RADIATION TX DELIVERY LVL 3: CPT | Performed by: INTERNAL MEDICINE

## 2022-07-26 PROCEDURE — 77387 GUIDANCE FOR RADJ TX DLVR: CPT | Performed by: INTERNAL MEDICINE

## 2022-07-26 NOTE — TELEPHONE ENCOUNTER
----- Message from Nora Hernandez MA sent at 7/26/2022  9:27 AM EDT -----  Regarding: FW: appointment    ----- Message -----  From: Nora Hernandez MA  Sent: 7/26/2022   7:37 AM EDT  To: Eloise Mckinney MD, #  Subject: RE: appointment                                  Jen Delaney can you kindly open up [facility] schedule for 8/18 at 1:40pm to see this patient  Thanks  ----- Message -----  From: Angeles Sparks  Sent: 7/25/2022   2:42 PM EDT  To: Ranjana Rueda MD, Nora Hernandez MA, #  Subject: appointment                                      Can you please add him to my schedule the same day he sees Dr Rob Sena     ----- Message -----  From: Nora Hernandez MA  Sent: 7/25/2022   1:34 PM EDT  To: Eloise Mckinney MD, #    [place] you can give her the Chinese one and she could fill it out  I dont feel comfortable completing this  Thanks   ----- Message -----  From: Ranjana Rueda MD  Sent: 7/21/2022  10:34 PM EDT  To: Hina Keith MA, #    Would appreciate help of some or all of you to see this fellow again to complete Five Wishes / Anshul Askew with him and his partner BAKARI JOSEPH

## 2022-07-26 NOTE — TELEPHONE ENCOUNTER
Daughter will be available until 1:30om He is c/o back pain and when he urinates it hurts and its very little amounts  She would like a call back she has a dentist appt at 1:30 if you can call before that per daughter

## 2022-07-27 ENCOUNTER — APPOINTMENT (OUTPATIENT)
Dept: RADIATION ONCOLOGY | Facility: HOSPITAL | Age: 72
End: 2022-07-27
Attending: RADIOLOGY
Payer: COMMERCIAL

## 2022-07-27 ENCOUNTER — PREP FOR PROCEDURE (OUTPATIENT)
Dept: INTERVENTIONAL RADIOLOGY/VASCULAR | Facility: CLINIC | Age: 72
End: 2022-07-27

## 2022-07-27 ENCOUNTER — APPOINTMENT (OUTPATIENT)
Dept: RADIATION ONCOLOGY | Facility: HOSPITAL | Age: 72
End: 2022-07-27
Payer: COMMERCIAL

## 2022-07-27 DIAGNOSIS — N13.30 HYDRONEPHROSIS, UNSPECIFIED HYDRONEPHROSIS TYPE: Primary | ICD-10-CM

## 2022-07-27 PROCEDURE — 77387 GUIDANCE FOR RADJ TX DLVR: CPT | Performed by: RADIOLOGY

## 2022-07-27 PROCEDURE — 77412 RADIATION TX DELIVERY LVL 3: CPT | Performed by: RADIOLOGY

## 2022-07-27 PROCEDURE — G6002 STEREOSCOPIC X-RAY GUIDANCE: HCPCS | Performed by: RADIOLOGY

## 2022-07-27 PROCEDURE — 77427 RADIATION TX MANAGEMENT X5: CPT | Performed by: INTERNAL MEDICINE

## 2022-07-27 NOTE — TELEPHONE ENCOUNTER
7/27/2022 9:17 AM -  Late entry - called family Gray Heimlich to discuss case roughly 4pm on 7/26  Confimred no fever, no pyuria, no hematuria, no vomiting  There are no skin changes over back  Pt is putting out less urine from urostomies, and pain with urinating is now less that pack in back/flank area  This is an evolving problem over last week  Agreed with family that an IR tube check is likely most appropriate first step  Will refer urgently today, 7/27       9:43 AM - confirmed receipt of urgent referral order by IR teammate at [phone] in B IR office

## 2022-07-28 ENCOUNTER — APPOINTMENT (OUTPATIENT)
Dept: RADIATION ONCOLOGY | Facility: HOSPITAL | Age: 72
End: 2022-07-28
Payer: COMMERCIAL

## 2022-07-28 PROCEDURE — G6002 STEREOSCOPIC X-RAY GUIDANCE: HCPCS | Performed by: INTERNAL MEDICINE

## 2022-07-28 PROCEDURE — 77387 GUIDANCE FOR RADJ TX DLVR: CPT | Performed by: INTERNAL MEDICINE

## 2022-07-28 PROCEDURE — 77412 RADIATION TX DELIVERY LVL 3: CPT | Performed by: INTERNAL MEDICINE

## 2022-07-29 ENCOUNTER — HOSPITAL ENCOUNTER (OUTPATIENT)
Dept: RADIOLOGY | Facility: HOSPITAL | Age: 72
Discharge: HOME | End: 2022-07-29
Attending: RADIOLOGY | Admitting: RADIOLOGY
Payer: COMMERCIAL

## 2022-07-29 ENCOUNTER — PATIENT OUTREACH (OUTPATIENT)
Dept: HEMATOLOGY ONCOLOGY | Facility: CLINIC | Age: 72
End: 2022-07-29

## 2022-07-29 ENCOUNTER — TELEPHONE (OUTPATIENT)
Dept: PALLIATIVE MEDICINE | Facility: CLINIC | Age: 72
End: 2022-07-29

## 2022-07-29 ENCOUNTER — APPOINTMENT (OUTPATIENT)
Dept: RADIATION ONCOLOGY | Facility: HOSPITAL | Age: 72
End: 2022-07-29
Payer: COMMERCIAL

## 2022-07-29 ENCOUNTER — PATIENT OUTREACH (OUTPATIENT)
Dept: FAMILY MEDICINE CLINIC | Facility: CLINIC | Age: 72
End: 2022-07-29

## 2022-07-29 ENCOUNTER — APPOINTMENT (OUTPATIENT)
Dept: RADIATION ONCOLOGY | Facility: HOSPITAL | Age: 72
End: 2022-07-29
Attending: RADIOLOGY
Payer: COMMERCIAL

## 2022-07-29 ENCOUNTER — TELEPHONE (OUTPATIENT)
Dept: HEMATOLOGY ONCOLOGY | Facility: CLINIC | Age: 72
End: 2022-07-29

## 2022-07-29 DIAGNOSIS — R30.0 DYSURIA: Primary | ICD-10-CM

## 2022-07-29 DIAGNOSIS — N13.30 HYDRONEPHROSIS, UNSPECIFIED HYDRONEPHROSIS TYPE: ICD-10-CM

## 2022-07-29 PROCEDURE — 50431 NJX PX NFROSGRM &/URTRGRM: CPT | Performed by: RADIOLOGY

## 2022-07-29 PROCEDURE — 50431 NJX PX NFROSGRM &/URTRGRM: CPT

## 2022-07-29 PROCEDURE — 77387 GUIDANCE FOR RADJ TX DLVR: CPT | Performed by: STUDENT IN AN ORGANIZED HEALTH CARE EDUCATION/TRAINING PROGRAM

## 2022-07-29 PROCEDURE — 77412 RADIATION TX DELIVERY LVL 3: CPT | Performed by: STUDENT IN AN ORGANIZED HEALTH CARE EDUCATION/TRAINING PROGRAM

## 2022-07-29 PROCEDURE — G6002 STEREOSCOPIC X-RAY GUIDANCE: HCPCS | Performed by: STUDENT IN AN ORGANIZED HEALTH CARE EDUCATION/TRAINING PROGRAM

## 2022-07-29 RX ADMIN — IOHEXOL 5 ML: 350 INJECTION, SOLUTION INTRAVENOUS at 14:14

## 2022-07-29 NOTE — PROGRESS NOTES
MSW made outreach to pt's wife this day to address her concerns  Voicemail was received  MSW left a detailed message, along with a contact number and encouraged wife to return the call

## 2022-07-29 NOTE — TELEPHONE ENCOUNTER
Patient stopped in the office requesting to speak with Colette Guerra,  I explained Colette doesn't work in our office but does work with us and if I could help

## 2022-07-29 NOTE — PROGRESS NOTES
[facility] received a telephone call from Darin Carlton patients care giver  Jareth Beavers has not received any mail from [employer]  Jose Esilvana Ruthann was approved or both SNAP and MA benefits 6/29/22  [facility] placed telephone call to [facility] office  [facility] left message asking office  contact myself or Cris Martin  [facility] left out telephone numbers in message  [facility] did discuss Jareth Beavers in this morning huddle, Shared with my POD the concerns and frustrations that patient has  [facility] RN Win Roche is gong to reach out to Rogers Haque's current  to see if we can help in anyway

## 2022-07-29 NOTE — DISCHARGE INSTRUCTIONS
Nephrostomy Tube Care     WHAT YOU NEED TO KNOW:   A nephrostomy tube is a catheter (thin plastic tube) that is inserted through your skin and into your kidney  The nephrostomy tube drains urine from your kidney into a collecting bag outside your body  You may need a nephrostomy tube when something is blocking the normal flow of urine  A nephrostomy tube may be used for a short or a long period of time  The nephrostomy tube comes out of your back, so you will need someone to help care for your nephrostomy tube  DISCHARGE INSTRUCTIONS:      How to clean the skin around the nephrostomy tube and change the bandage:  Since the nephrostomy tube comes out of your back, you will not be able to care for it by yourself  Ask someone to follow the general directions below to check and care for your nephrostomy tube  Gather the items you will need  Disposable (single use) under-pad, and a clean washcloth  Plain soap, warm water, and new medical gloves  Sterile gauze bandages  Clear adhesive dressing or medical tape  Skin barrier  Protective skin film  Trash bag  Remove the old bandage, and check the tube entry site  Have the patient lie on his side with the nephrostomy tube entry site facing up  Place the under-pad where it will catch drainage as you are working with the nephrostomy tube  Wash your hands with soap and water  Put on new medical gloves  Gently remove the old bandage, without pulling on the tube  Do this by holding the skin beside the tube with one hand  With the other hand, gently remove sticky tape and the skin barrier by pulling in the same direction as hair growth  Do not touch the side of the bandage that is placed over or around the tube  Throw the bandage and skin barrier away in a trash bag  Look for signs of infection, such as skin redness and swelling  Report any skin changes to healthcare providers  Clean the tube entry site      Hold the tube in place to keep it from being pulled out while you are cleaning around it  You will need to clean the area twice  For the first cleaning, wet a new gauze bandage with soap and water  Begin at the entry site of the tube  Wipe the skin in circles, moving away from the entry site  Remove blood and any other material with the gauze  Do this as often as needed  Use a new gauze bandage each time you clean the area, moving away from the entry site  For the second cleaning, wet a new gauze bandage with water  Begin at the entry site of the tube  Wipe the skin in circles, moving away from the entry site  Use a new gauze bandage each time you clean the area, moving away from the entry site  Gently pat the skin with a clean washcloth to dry it  Apply the skin barrier and bandages  Roll up a bandage to make it thick, and place it under  the place where the tube enters the skin  Place it to support the tube, and stop it from kinking or bending  Tape the bandage in place, and apply more bandages if directed by a healthcare provider  Bring the tubing forward to the front and tape it to the skin  Do not stretch the tube tight, because this may pull the nephrostomy tube out  How often to change the bandage  Change the bandage around the tube, every other day  If your bandages  get dirty or wet, change them right away, and as often as needed  If your nephrostomy tube is to be used for a long period of time, the tube needs to be changed every 2 to 3 months  Healthcare providers will tell you when you need to make an appointment to have your tube changed  How to care for the urine drainage bag:   Ask if you need to measure and write down how much urine is in the bag before you empty it  Drain urine out of the drainage bag when it is ½ to ? full  Open the spout at the bottom of the bag to empty the urine into the toilet  You may need to detach the drainage bag from the nephrostomy tube to change it    If so, attach a new drainage bag tightly to the nephrostomy tube  How to prevent problems with your nephrostomy tube:   Change bandages, directed  This helps to prevent infection  Throw away or clean your drainage bag as directed by your healthcare provider  Wipe the connecting ends of the drainage bag with alcohol before you reconnect the bag to the tube  This helps prevent infection  Keep the tube taped to your skin and connected to a drainage bag placed below the level of your kidneys  This helps prevent urine from backing up into your kidneys  You may wear a small drainage bag strapped to your leg to let you move around more easily  Check the catheter to be sure it is in place after you change your clothes or do other activities  Do not wear tight clothing over the tube  Place the tubing over your thigh rather than under it when you are sitting down  Be sure that nothing is pulling on the nephrostomy tube when you move around  Change positions if you see little or no urine in your drainage bag  Check to see if the urine tube is twisted or bent  Be sure that you are not sitting or lying on the tube  If there are no kinks and there is little or no urine in the drainage bag, tell your healthcare provider  Flush out the tube as directed  Some tubes get flushed one time a day with 10 mls of NSS You will be given a prescription for the flushes  To flush the nephrostomy tube, clean both connections with alcohol swap  Twist off the drainage bag tube and twist the saline syringe into the nephrostomy tube and flush briskly  Remove the syringe and twist the drainage bag tube back into the nephrostomy tube  Keep the site covered while you shower  Tape a piece of clear adhesive plastic over the dressing to keep it dry while you shower  Do not take tub baths      Contact Interventional Radiology at [phone] [place] PATIENTS: Contact Interventional Radiology at [phone]) Shala [place] PATIENTS: Contact Interventional Radiology at [phone]) if:  The skin around the nephrostomy tube is red, swollen, itches, or has a rash  You have a fever greater than 101 or chills  You have lower back or hip pain  There are changes in how your urine looks or smells  You have little or no urine draining from the nephrostomy tube  You have nausea and are vomiting  The black jennifer on your tube has moved, or the tube is longer than when it was put in  You have questions or concerns about your condition or care  The nephrostomy tube comes out completely  There is blood, pus, or a bad smell coming from the place where the tube enters your skin  Urine is leaking around the tube  The following pharmacies carry the flush syringes  [facility]                     [facility]       [address]  Phone [phone]            Phone [phone]  [address] [facility]                      [address]                                 [phone]  Phone [phone]            Phone [phone]    [facility]                                                                         [facility] [phone]  [address]   Phone [phone]

## 2022-07-29 NOTE — TELEPHONE ENCOUNTER
Pt called office pain  Pt was asked the following questions to get a better understanding of their concern        WHAT kind of pain? (Burning/Sharp/Aching/Dull):    Pain/burning when urinating    Patient stated the the 3 pills that were prescribed - phenazopyridine 100 mg tablet did not help with symptoms    Please advise     Kian [address] (spouse) is also requesting to talk to a  regarding getting some support

## 2022-07-31 ENCOUNTER — PATIENT OUTREACH (OUTPATIENT)
Dept: HEMATOLOGY ONCOLOGY | Facility: CLINIC | Age: 72
End: 2022-07-31

## 2022-07-31 NOTE — PROGRESS NOTES
MSW made outreach to pt's wife this day to offer support and to assess needs  Marlin Palomo was upset as she states that the pt is in pain, despite having a new pain medication  MSW spent time listening and offering support  She was encouraged to outreach palliative care  A lengthy discussion was had about the waiver program and Marlin Palomo states that the pt has decided he does not want anyone in the house to care for him, he only wants her to provide care  MSW spoke with Marlin Palomo about her own emotional health as she is caring for her mother and the pt but she feels as though she "doesn't want to upset him by having someone in the house "  Marlin Palomo again asking about the Glucerna and at this time, she reports the pt has not been taking any supplements  MSW explained this would need to be ordered through a prescription  A Registered Dietitian Referral made this day  Sofaineptali Palomo also asking about briefs and MSW to follow up with Jackelin Strauss as a referral was placed to assist with this  Significant support offered and Marlin Palomo sounded less anxious at the end of the call

## 2022-08-01 ENCOUNTER — APPOINTMENT (OUTPATIENT)
Dept: RADIATION ONCOLOGY | Facility: HOSPITAL | Age: 72
End: 2022-08-01
Payer: COMMERCIAL

## 2022-08-01 ENCOUNTER — APPOINTMENT (OUTPATIENT)
Dept: RADIATION ONCOLOGY | Facility: HOSPITAL | Age: 72
End: 2022-08-01
Attending: RADIOLOGY
Payer: COMMERCIAL

## 2022-08-01 ENCOUNTER — DOCUMENTATION (OUTPATIENT)
Dept: HEMATOLOGY ONCOLOGY | Facility: CLINIC | Age: 72
End: 2022-08-01

## 2022-08-01 DIAGNOSIS — R30.0 DYSURIA: Primary | ICD-10-CM

## 2022-08-01 PROCEDURE — 77387 GUIDANCE FOR RADJ TX DLVR: CPT | Performed by: INTERNAL MEDICINE

## 2022-08-01 PROCEDURE — G6002 STEREOSCOPIC X-RAY GUIDANCE: HCPCS | Performed by: INTERNAL MEDICINE

## 2022-08-01 PROCEDURE — 77412 RADIATION TX DELIVERY LVL 3: CPT | Performed by: INTERNAL MEDICINE

## 2022-08-01 RX ORDER — LIDOCAINE HYDROCHLORIDE 20 MG/ML
1 JELLY TOPICAL DAILY PRN
Status: SHIPPED | OUTPATIENT
Start: 2022-08-01

## 2022-08-01 NOTE — PROGRESS NOTES
Received email from Felecia wanting to kow if pt has active medicaid  Pt's wife was told that pt qualified for medicaid  Told jared I will look into it  Ckd promise & pt is inactive  Called the statewide  & spoke to judith  She sd that pt has the buy in for medicaid   That program is where medicaid pays for the medicare part B deduct & that all they qualify for  Pt is over the income limit for medical assistance  Gave that info to Felecia & she will call the pts wife & tell her that  Ha Cerda

## 2022-08-01 NOTE — PROGRESS NOTES
Patient had minimal relief with pyridium  Continues to have dysuria and had negative UA recently  Will give PRN Uro-Jet to see if he has some relief

## 2022-08-01 NOTE — TELEPHONE ENCOUNTER
Reached out to Elwood again to touch base as MSW Asif Speaks  reached out to this RD regarding Glucerna shakes being covered through insurance  Elwood asked that I call his wife  Pending sale to [facility] and introduced self again, explained reason for call  She declined RD appointment at this time, stating that she knows what [place] needs to eat  She states she would like to get a script for Glucerna  This RD encouraged her to reach out if they change their mind and would like to schedule an appt  RD contact information provided  Will assist MSW with trying to order Glucerna shakes

## 2022-08-02 ENCOUNTER — APPOINTMENT (OUTPATIENT)
Dept: RADIATION ONCOLOGY | Facility: HOSPITAL | Age: 72
End: 2022-08-02
Payer: COMMERCIAL

## 2022-08-02 PROCEDURE — G6002 STEREOSCOPIC X-RAY GUIDANCE: HCPCS | Performed by: INTERNAL MEDICINE

## 2022-08-02 PROCEDURE — 77387 GUIDANCE FOR RADJ TX DLVR: CPT | Performed by: INTERNAL MEDICINE

## 2022-08-02 PROCEDURE — 77412 RADIATION TX DELIVERY LVL 3: CPT | Performed by: INTERNAL MEDICINE

## 2022-08-02 PROCEDURE — 77336 RADIATION PHYSICS CONSULT: CPT | Performed by: INTERNAL MEDICINE

## 2022-08-02 NOTE — TELEPHONE ENCOUNTER
8/2/2022 4:07 PM -  Ongoing use of Pyridium is not indicated due to his poor renal funciton  Pt did have tube exchange on 7/29, with no improvement in penile urethritis / dysuria symptoms  Continues to be non-toxic, accoridng to wife  Has not been seen in person since these symptoms started, except for tube exchanges  Advised visit to office in person, and to give urine sample to look for infection  Last UA on 7/17 was negative for infection, but was positive for multiple whites and reds  If no infectious markers again, will consider if this is a side effect of chemo, or underlying disease process, and if steroids may be appropriate          I am less familiar with this pt's case, and would also defer to Med/Onc if it is felt he should have Uro eval

## 2022-08-03 ENCOUNTER — LAB (OUTPATIENT)
Dept: LAB | Facility: HOSPITAL | Age: 72
End: 2022-08-03
Attending: FAMILY MEDICINE
Payer: COMMERCIAL

## 2022-08-03 ENCOUNTER — HOSPITAL ENCOUNTER (OUTPATIENT)
Dept: INFUSION CENTER | Facility: HOSPITAL | Age: 72
Discharge: HOME | End: 2022-08-03

## 2022-08-03 ENCOUNTER — APPOINTMENT (OUTPATIENT)
Dept: RADIATION ONCOLOGY | Facility: HOSPITAL | Age: 72
End: 2022-08-03
Attending: RADIOLOGY
Payer: COMMERCIAL

## 2022-08-03 ENCOUNTER — TELEPHONE (OUTPATIENT)
Dept: NEPHROLOGY | Facility: CLINIC | Age: 72
End: 2022-08-03

## 2022-08-03 ENCOUNTER — TELEPHONE (OUTPATIENT)
Dept: FAMILY MEDICINE CLINIC | Facility: CLINIC | Age: 72
End: 2022-08-03

## 2022-08-03 DIAGNOSIS — R30.0 DYSURIA: Primary | ICD-10-CM

## 2022-08-03 LAB
BACTERIA UR QL AUTO: ABNORMAL /HPF
BILIRUB UR QL STRIP: NEGATIVE
CLARITY UR: ABNORMAL
COLOR UR: COLORLESS
GLUCOSE UR STRIP-MCNC: NEGATIVE MG/DL
HGB UR QL STRIP.AUTO: ABNORMAL
KETONES UR STRIP-MCNC: NEGATIVE MG/DL
LEUKOCYTE ESTERASE UR QL STRIP: ABNORMAL
MUCOUS THREADS UR QL AUTO: ABNORMAL
NITRITE UR QL STRIP: NEGATIVE
NON-SQ EPI CELLS URNS QL MICRO: ABNORMAL /HPF
PH UR STRIP.AUTO: 7 [PH]
PROT UR STRIP-MCNC: ABNORMAL MG/DL
RBC #/AREA URNS AUTO: ABNORMAL /HPF
SP GR UR STRIP.AUTO: 1.01 (ref 1–1.03)
UROBILINOGEN UR STRIP-ACNC: <2 MG/DL
WBC #/AREA URNS AUTO: ABNORMAL /HPF
WBC CLUMPS # UR AUTO: PRESENT /UL

## 2022-08-03 PROCEDURE — G6002 STEREOSCOPIC X-RAY GUIDANCE: HCPCS | Performed by: RADIOLOGY

## 2022-08-03 PROCEDURE — 77412 RADIATION TX DELIVERY LVL 3: CPT | Performed by: RADIOLOGY

## 2022-08-03 PROCEDURE — 77387 GUIDANCE FOR RADJ TX DLVR: CPT | Performed by: RADIOLOGY

## 2022-08-03 PROCEDURE — 87086 URINE CULTURE/COLONY COUNT: CPT

## 2022-08-03 PROCEDURE — 87186 SC STD MICRODIL/AGAR DIL: CPT

## 2022-08-03 PROCEDURE — 81001 URINALYSIS AUTO W/SCOPE: CPT

## 2022-08-03 PROCEDURE — 87077 CULTURE AEROBIC IDENTIFY: CPT

## 2022-08-03 NOTE — TELEPHONE ENCOUNTER
Wife called stating pt recently dx with bladder cancer  Having trouble urinating and has decreased flow when trying  Had urinalysis today   Given appt with Susan Gallagher on 8/9, per their request  Pt's wife would like Dr Chet Lamar aware of what's going on

## 2022-08-03 NOTE — TELEPHONE ENCOUNTER
Attempted to call Yesika Boyd, daughter answered the phone and asked to call back later when Yesika Boyd returns home

## 2022-08-03 NOTE — TELEPHONE ENCOUNTER
Keyur Petty called requesting an appt with you to discuss the patient's recent cancer diagnosis  You do not have availability this month besides same days  Is this something you'd like scheduled in a same day? If so, 15 or 30 min?  Patient does not want to see another provider for this matter    Please advise    Patient made aware you are out of the office until next week

## 2022-08-03 NOTE — TELEPHONE ENCOUNTER
Spoke with Lita Dye who stated his symptoms of burning with urination is not an acute change and has been happening for some time  Reviewed recommendations to follow up with Urology or Nephrology to discuss symptoms as he has seen Dr Lauren Perez in the past  She was agreeable to the call his office  Also advised her to f/u with Dr Jace Rivero to review urinalysis results  She was agreeable to this  Dr Jace Rivero, I wanted to keep you updated as patient will follow up with you for urinalysis results  If you would still like the patient to be scheduled with you this week  I don't see anything scheduled as of yet  Thank you!

## 2022-08-04 ENCOUNTER — APPOINTMENT (OUTPATIENT)
Dept: RADIATION ONCOLOGY | Facility: HOSPITAL | Age: 72
End: 2022-08-04
Payer: COMMERCIAL

## 2022-08-04 PROCEDURE — 77412 RADIATION TX DELIVERY LVL 3: CPT | Performed by: INTERNAL MEDICINE

## 2022-08-04 PROCEDURE — G6002 STEREOSCOPIC X-RAY GUIDANCE: HCPCS | Performed by: INTERNAL MEDICINE

## 2022-08-04 PROCEDURE — 77387 GUIDANCE FOR RADJ TX DLVR: CPT | Performed by: INTERNAL MEDICINE

## 2022-08-04 RX ORDER — TAMSULOSIN HYDROCHLORIDE 0.4 MG/1
0.4 CAPSULE ORAL
Qty: 90 CAPSULE | Refills: 0 | Status: SHIPPED | OUTPATIENT
Start: 2022-08-04 | End: 2022-11-02

## 2022-08-04 RX ORDER — SULFAMETHOXAZOLE AND TRIMETHOPRIM 800; 160 MG/1; MG/1
1 TABLET ORAL 2 TIMES DAILY
Qty: 14 TABLET | Refills: 0 | Status: SHIPPED | OUTPATIENT
Start: 2022-08-04 | End: 2022-08-11

## 2022-08-04 NOTE — TELEPHONE ENCOUNTER
Called and spoke to pt's GF she stated over the last month pt has been getting radiation and having a lot of urinary symptoms  Pt is having frequency, urgency, and burning  Pt is going is small amounts frequently pt would like to know if there is medication to help  Pt's GF stated he was tested for an infection but does not have one  Pt had urin sample yesterday       Please advise

## 2022-08-04 NOTE — TELEPHONE ENCOUNTER
Patient's significant other calling for recommendations  She stated patient has been having burning and pain when he urinates for the past month  She also stated he is drinking a lot of water but barely urinating      She is requesting a call back at [phone]

## 2022-08-04 NOTE — TELEPHONE ENCOUNTER
I did speak with Roxann Rene and we scheduled this patient with Dr Cameron Morales at 1100am 8/5/22 after his 945 radiation

## 2022-08-04 NOTE — TELEPHONE ENCOUNTER
Contacted patients significant other and made her aware of urine culture results and that Bactrim and Flomax were sent to his pharmacy on file  She verbalized understanding

## 2022-08-04 NOTE — TELEPHONE ENCOUNTER
Urine sample collected yesterday shows greater than 100 K g negative rods consistent with urinary tract infection  The one from July was negative but this one is positive  I have sent over prescription of Bactrim he should take twice daily for one week  I have also prescribed him Flomax for baseline urinary bother while going through radiation

## 2022-08-04 NOTE — TELEPHONE ENCOUNTER
Called wife - advised her of Dr Mega Talbert message  Wife states that palliative care advised them to contact our office with their concerns so that's what she did  Will call urology and get patient set up ASAP for an appointment  Cx appt for 8/8 with Yesy Walters   Patient still scheduled for fu in Sept  With Dr Riddle Bring

## 2022-08-05 ENCOUNTER — APPOINTMENT (EMERGENCY)
Dept: RADIOLOGY | Facility: HOSPITAL | Age: 72
End: 2022-08-05
Payer: COMMERCIAL

## 2022-08-05 ENCOUNTER — APPOINTMENT (OUTPATIENT)
Dept: RADIATION ONCOLOGY | Facility: HOSPITAL | Age: 72
End: 2022-08-05
Attending: RADIOLOGY
Payer: COMMERCIAL

## 2022-08-05 ENCOUNTER — OFFICE VISIT (OUTPATIENT)
Dept: PALLIATIVE MEDICINE | Facility: CLINIC | Age: 72
End: 2022-08-05
Payer: COMMERCIAL

## 2022-08-05 ENCOUNTER — HOSPITAL ENCOUNTER (EMERGENCY)
Facility: HOSPITAL | Age: 72
Discharge: HOME | End: 2022-08-05
Attending: STUDENT IN AN ORGANIZED HEALTH CARE EDUCATION/TRAINING PROGRAM
Payer: COMMERCIAL

## 2022-08-05 ENCOUNTER — APPOINTMENT (OUTPATIENT)
Dept: RADIATION ONCOLOGY | Facility: HOSPITAL | Age: 72
End: 2022-08-05
Payer: COMMERCIAL

## 2022-08-05 ENCOUNTER — TELEPHONE (OUTPATIENT)
Dept: OTHER | Facility: OTHER | Age: 72
End: 2022-08-05

## 2022-08-05 VITALS
OXYGEN SATURATION: 98 % | DIASTOLIC BLOOD PRESSURE: 64 MMHG | SYSTOLIC BLOOD PRESSURE: 124 MMHG | HEART RATE: 68 BPM | TEMPERATURE: 97.5 F | RESPIRATION RATE: 20 BRPM

## 2022-08-05 VITALS
BODY MASS INDEX: 22.66 KG/M2 | SYSTOLIC BLOOD PRESSURE: 105 MMHG | OXYGEN SATURATION: 99 % | WEIGHT: 123.9 LBS | HEART RATE: 67 BPM | TEMPERATURE: 96.6 F | DIASTOLIC BLOOD PRESSURE: 68 MMHG

## 2022-08-05 DIAGNOSIS — G47.01 INSOMNIA DUE TO MEDICAL CONDITION: ICD-10-CM
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
## 2022-08-05 DIAGNOSIS — N39.0 UTI (URINARY TRACT INFECTION): ICD-10-CM

## 2022-08-05 DIAGNOSIS — C67.0 MALIGNANT NEOPLASM OF TRIGONE OF URINARY BLADDER (HCC): Primary | ICD-10-CM

## 2022-08-05 PROBLEM — N34.2 URETHRITIS: Status: ACTIVE | Noted: 2022-08-05

## 2022-08-05 LAB
ABO GROUP BLD: NORMAL
ANION GAP SERPL CALCULATED.3IONS-SCNC: 8 MMOL/L (ref 4–13)
BASE EXCESS BLDA CALC-SCNC: -3 MMOL/L (ref -2–3)
BASOPHILS # BLD AUTO: 0.04 THOUSANDS/ΜL (ref 0–0.1)
BASOPHILS NFR BLD AUTO: 1 % (ref 0–1)
BLD GP AB SCN SERPL QL: NEGATIVE
BUN SERPL-MCNC: 55 MG/DL (ref 5–25)
CA-I BLD-SCNC: 1 MMOL/L (ref 1.12–1.32)
CALCIUM SERPL-MCNC: 8.8 MG/DL (ref 8.3–10.1)
CHLORIDE SERPL-SCNC: 106 MMOL/L (ref 96–108)
CO2 SERPL-SCNC: 21 MMOL/L (ref 21–32)
CREAT SERPL-MCNC: 2.87 MG/DL (ref 0.6–1.3)
EOSINOPHIL # BLD AUTO: 0.32 THOUSAND/ΜL (ref 0–0.61)
EOSINOPHIL NFR BLD AUTO: 5 % (ref 0–6)
ERYTHROCYTE [DISTWIDTH] IN BLOOD BY AUTOMATED COUNT: 18 % (ref 11.6–15.1)
GFR SERPL CREATININE-BSD FRML MDRD: 20 ML/MIN/1.73SQ M
GLUCOSE SERPL-MCNC: 178 MG/DL (ref 65–140)
GLUCOSE SERPL-MCNC: 179 MG/DL (ref 65–140)
HCO3 BLDA-SCNC: 20.5 MMOL/L (ref 24–30)
HCT VFR BLD AUTO: 27.8 % (ref 36.5–49.3)
HCT VFR BLD CALC: 27 % (ref 36.5–49.3)
HGB BLD-MCNC: 8.3 G/DL (ref 12–17)
HGB BLDA-MCNC: 9.2 G/DL (ref 12–17)
IMM GRANULOCYTES # BLD AUTO: 0.05 THOUSAND/UL (ref 0–0.2)
IMM GRANULOCYTES NFR BLD AUTO: 1 % (ref 0–2)
LYMPHOCYTES # BLD AUTO: 0.79 THOUSANDS/ΜL (ref 0.6–4.47)
LYMPHOCYTES NFR BLD AUTO: 12 % (ref 14–44)
MCH RBC QN AUTO: 23.9 PG (ref 26.8–34.3)
MCHC RBC AUTO-ENTMCNC: 29.9 G/DL (ref 31.4–37.4)
MCV RBC AUTO: 80 FL (ref 82–98)
MONOCYTES # BLD AUTO: 0.87 THOUSAND/ΜL (ref 0.17–1.22)
MONOCYTES NFR BLD AUTO: 13 % (ref 4–12)
NEUTROPHILS # BLD AUTO: 4.55 THOUSANDS/ΜL (ref 1.85–7.62)
NEUTS SEG NFR BLD AUTO: 68 % (ref 43–75)
NRBC BLD AUTO-RTO: 0 /100 WBCS
PCO2 BLD: 21 MMOL/L (ref 21–32)
PCO2 BLD: 28.3 MM HG (ref 42–50)
PH BLD: 7.47 [PH] (ref 7.3–7.4)
PLATELET # BLD AUTO: 83 THOUSANDS/UL (ref 149–390)
PMV BLD AUTO: 12.5 FL (ref 8.9–12.7)
PO2 BLD: 39 MM HG (ref 35–45)
POTASSIUM BLD-SCNC: 4.8 MMOL/L (ref 3.5–5.3)
POTASSIUM SERPL-SCNC: 4.3 MMOL/L (ref 3.5–5.3)
RBC # BLD AUTO: 3.48 MILLION/UL (ref 3.88–5.62)
RH BLD: POSITIVE
SAO2 % BLD FROM PO2: 79 % (ref 60–85)
SODIUM BLD-SCNC: 135 MMOL/L (ref 136–145)
SODIUM SERPL-SCNC: 135 MMOL/L (ref 135–147)
SPECIMEN EXPIRATION DATE: NORMAL
SPECIMEN SOURCE: ABNORMAL
WBC # BLD AUTO: 6.62 THOUSAND/UL (ref 4.31–10.16)

## 2022-08-05 PROCEDURE — 71045 X-RAY EXAM CHEST 1 VIEW: CPT

## 2022-08-05 PROCEDURE — 82330 ASSAY OF CALCIUM: CPT

## 2022-08-05 PROCEDURE — 84295 ASSAY OF SERUM SODIUM: CPT

## 2022-08-05 PROCEDURE — 86850 RBC ANTIBODY SCREEN: CPT | Performed by: STUDENT IN AN ORGANIZED HEALTH CARE EDUCATION/TRAINING PROGRAM

## 2022-08-05 PROCEDURE — 86901 BLOOD TYPING SEROLOGIC RH(D): CPT | Performed by: STUDENT IN AN ORGANIZED HEALTH CARE EDUCATION/TRAINING PROGRAM

## 2022-08-05 PROCEDURE — 70450 CT HEAD/BRAIN W/O DYE: CPT

## 2022-08-05 PROCEDURE — G6002 STEREOSCOPIC X-RAY GUIDANCE: HCPCS | Performed by: STUDENT IN AN ORGANIZED HEALTH CARE EDUCATION/TRAINING PROGRAM

## 2022-08-05 PROCEDURE — 76705 ECHO EXAM OF ABDOMEN: CPT | Performed by: STUDENT IN AN ORGANIZED HEALTH CARE EDUCATION/TRAINING PROGRAM

## 2022-08-05 PROCEDURE — 85014 HEMATOCRIT: CPT

## 2022-08-05 PROCEDURE — 90471 IMMUNIZATION ADMIN: CPT

## 2022-08-05 PROCEDURE — 90715 TDAP VACCINE 7 YRS/> IM: CPT | Performed by: SURGERY

## 2022-08-05 PROCEDURE — 93308 TTE F-UP OR LMTD: CPT | Performed by: STUDENT IN AN ORGANIZED HEALTH CARE EDUCATION/TRAINING PROGRAM

## 2022-08-05 PROCEDURE — 80048 BASIC METABOLIC PNL TOTAL CA: CPT | Performed by: STUDENT IN AN ORGANIZED HEALTH CARE EDUCATION/TRAINING PROGRAM

## 2022-08-05 PROCEDURE — 99214 OFFICE O/P EST MOD 30 MIN: CPT | Performed by: FAMILY MEDICINE

## 2022-08-05 PROCEDURE — 85025 COMPLETE CBC W/AUTO DIFF WBC: CPT | Performed by: STUDENT IN AN ORGANIZED HEALTH CARE EDUCATION/TRAINING PROGRAM

## 2022-08-05 PROCEDURE — 84132 ASSAY OF SERUM POTASSIUM: CPT

## 2022-08-05 PROCEDURE — 82947 ASSAY GLUCOSE BLOOD QUANT: CPT

## 2022-08-05 PROCEDURE — 36415 COLL VENOUS BLD VENIPUNCTURE: CPT | Performed by: STUDENT IN AN ORGANIZED HEALTH CARE EDUCATION/TRAINING PROGRAM

## 2022-08-05 PROCEDURE — 82803 BLOOD GASES ANY COMBINATION: CPT

## 2022-08-05 PROCEDURE — 77387 GUIDANCE FOR RADJ TX DLVR: CPT | Performed by: STUDENT IN AN ORGANIZED HEALTH CARE EDUCATION/TRAINING PROGRAM

## 2022-08-05 PROCEDURE — 99284 EMERGENCY DEPT VISIT MOD MDM: CPT

## 2022-08-05 PROCEDURE — 86900 BLOOD TYPING SEROLOGIC ABO: CPT | Performed by: STUDENT IN AN ORGANIZED HEALTH CARE EDUCATION/TRAINING PROGRAM

## 2022-08-05 PROCEDURE — 72125 CT NECK SPINE W/O DYE: CPT

## 2022-08-05 PROCEDURE — 99205 OFFICE O/P NEW HI 60 MIN: CPT | Performed by: STUDENT IN AN ORGANIZED HEALTH CARE EDUCATION/TRAINING PROGRAM

## 2022-08-05 PROCEDURE — 77412 RADIATION TX DELIVERY LVL 3: CPT | Performed by: STUDENT IN AN ORGANIZED HEALTH CARE EDUCATION/TRAINING PROGRAM

## 2022-08-05 RX ADMIN — TETANUS TOXOID, REDUCED DIPHTHERIA TOXOID AND ACELLULAR PERTUSSIS VACCINE, ADSORBED 0.5 ML: 5; 2.5; 8; 8; 2.5 SUSPENSION INTRAMUSCULAR at 06:26

## 2022-08-05 NOTE — PROGRESS NOTES
Outpatient Follow-Up - Palliative and Supportive Care   Gene Ahumada 74 y o  male [id number]    Assessment  Problem List Items Addressed This Visit        Genitourinary    Malignant neoplasm of trigone of urinary bladder ([address] ) - Primary    UTI (urinary tract infection)       Other    Insomnia        Plan  Urinary Tract infection with urethritis  1) Patient prescribed Flomax and Bactrim -160mg, one tablet twice a day for 7 days per Urology specialists  2) Discussed with patient to remain compliance with antibiotic and to return to clinic to see me in 1 week for follow-up of symptoms    Malignant neoplasm of trigone of urinary bladder  1) Continue current regimen for pain control  2) Continue current bowel regimen  3) Will re-visit ACP with patient at next visit  Insomnia  1) Patient to follow-up with his PCP (appt on 8/11/2022 per patient) for re-evaluation of his sleep as he receives his ambien and temazepam for management of patient's continued insomnia   -Had a thorough discussion with patient to take medications ONLY AS PRESCRIBED as patient admits to taking extra doses of ambien and temazepam 2-3 hours after waking up at night in efforts to return to sleep  I discussed the detriments of overdosing with taking extra doses of ambien and temazepam not limited to sedation, dizziness, and contribution to falls/unsteadiness    -Patient and his significant other Aby Ordonez - partner of over 30 years) states understanding and agrees to take medications only as prescribed  Medications adjusted this encounter:  Requested Prescriptions      No prescriptions requested or ordered in this encounter     No orders of the defined types were placed in this encounter  There are no discontinued medications  Gene Ahumada was seen today for symptoms and planning cares related to above illnesses    I have attempted to review but was unable to access the the patient's controlled substance dispensing history in the Prescription Drug Monitoring Program in compliance with the [facility] regulations before prescribing any controlled substances  They are invited to continue to follow with us  If there are questions or concerns, please contact us through our clinic/answering service 24 hours a day, seven days a week  Billy Lanier DO  [facility] Palliative and Supportive [facility]  [phone]      Visit Information    Accompanied By: Significant other    Source of History: Self, Significant other    History Limitations: None    Contacts:Contact Information:  Name: Scooby Shafer, Relationship: Significant other (partner of 30 years - not legally ), Home Number: as documented in chart    History of Present Illness      Jaron Rizo is a 74 y o  male who presents in follow up of symptoms related to burning with urination and trouble with sleeping  Pertinent issues include: symptom management, advance care planning  Patient is here for concerns of trouble with pain/burning with urination and troubles with sleeping  He is accompanied by his significant other Familia [place] - of over 30 years)  States he had a fall this morning and went to [facility] ER for evaluation  CT imaging of the head reviewed with no acute intracranial abnormality found  Patient states he is doing better at this time, but still has unsteadiness with ambulation at times  Patient admits to taking 1 extra dose of ambien and temazepam on occasion (states 1-2 times in the past) when he cannot get to sleep 2-3 hours after having already taken his night time doses  Patient's significant other, Scooby Shafer, states this has occurred many more times than patient admitted  States that when patient tried to get refill, could not get it due to it being too early  Patient wishes to have better control of his sleep and agrees to follow-up with his PCP as he has an upcoming appointment on 8/11/2022 but of main importance to is control his urinary symptoms   Patient was prescribed Bactrim DS and Flomax per her Urology group  He states understanding and will take his antibiotics as prescribed with follow-up with me in 1 week to re-evaluate  Past medical, surgical, social, and family histories are reviewed and pertinent updates are made  Review of Systems   Constitutional: Negative for decreased appetite and fever  Eyes: Negative for blurred vision and visual disturbance  Cardiovascular: Positive for near-syncope (had a fall this morning and evaluated at [facility] Emergency room)  Negative for chest pain and leg swelling  Respiratory: Negative for shortness of breath  Skin:        Bruising and cuts from fall this morning  Musculoskeletal: Positive for falls  Negative for joint swelling and muscle weakness  Gastrointestinal: Negative for abdominal pain and dysphagia  Genitourinary: Positive for bladder incontinence, dysuria, frequency, hesitancy and nocturia  Neurological: Positive for light-headedness (occasional bouts of light-headedness since chemotherapy initiated) and loss of balance (uses railings but refuses walker/cane at this time)  Negative for headaches and numbness  Psychiatric/Behavioral: Negative for altered mental status  The patient has insomnia (worsened since the start of chemotherapy (medications with Ambien and temazepam no longer help like it use to))  Vital Signs    /68 (BP Location: Right arm, Patient Position: Sitting, Cuff Size: Standard)   Pulse 67   Temp (!) 96 6 °F (35 9 °C) (Temporal)   Wt 56 2 kg (123 lb 14 4 oz)   SpO2 99%   BMI 22 66 kg/m²     Physical Exam and Objective Data  Physical Exam  Constitutional:       General: He is not in acute distress  Appearance: He is ill-appearing (chronically ill-appearing)  He is not diaphoretic  Comments: Thin body habitus   HENT:      Head:      Comments: Bandage to the right temporal region due to skin abrasion, slight temporal wasting noted       Nose: Nose normal  Mouth/Throat:      Mouth: Mucous membranes are moist    Eyes:      General: No scleral icterus  Cardiovascular:      Rate and Rhythm: Normal rate  Pulmonary:      Effort: Pulmonary effort is normal  No respiratory distress  Breath sounds: No stridor  Abdominal:      General: Abdomen is flat  There is no distension  Musculoskeletal:         General: No swelling or deformity  Skin:     General: Skin is warm and dry  Coloration: Skin is not pale  Comments: Wounds to skin of the right temporal region, right forearm, and left forearm with bandages intact  Neurological:      Mental Status: He is alert and oriented to person, place, and time  Psychiatric:         Mood and Affect: Mood normal          Behavior: Behavior normal          Thought Content: Thought content normal            Radiology and Laboratory:  I personally reviewed and interpreted the following results: Mikael Lambert DO    35 minutes was spent face to face with Trudi Whitehead and his significant other with greater than 50% of the time spent in counseling or coordination of care including discussions of diagnostic results, impression, and recommendations, risks and benefits of treatment, instructions for disease self management, treatment instructions, follow up requirements, patient and family counseling/involvement in care and compliance with treatment regimen  All of the patient's or agent's questions were answered during this discussion

## 2022-08-05 NOTE — PROGRESS NOTES
Spiritual Care Progress Note    2022  Patient: Hannah Duarte : 1950  Admission Date & Time: 2022 0500  MRN: [id number] CSN: [id number]       responded to trauma alert per protocol   provided support and offered hospitality to BAKARI JOSEPH, pt's Bj Balling noted this is Rogers's second fall in the last 6 months  Spiritual care will remain available  22 0500   Clinical Encounter Type   Visited With Family; Health care provider   Routine Visit Introduction   Crisis Visit Trauma

## 2022-08-05 NOTE — PROCEDURES
POC FAST US    Date/Time: 8/5/2022 5:36 AM  Performed by: Edvin Lucas MD  Authorized by: Edvin Lucas MD     Patient location:  Trauma  Procedure details:     Exam Type:  Diagnostic    Assess for:  Intra-abdominal fluid and pericardial effusion    Technique: FAST      Views obtained:  Heart - Pericardial sac, RUQ - Rodriguez's Pouch, LUQ - Splenorenal space and Suprapubic - Pouch of Dagoberto    Image quality: diagnostic      Image availability:  Images available in PACS  FAST Findings:     RUQ (Hepatorenal) free fluid: absent      LUQ (Splenorenal) free fluid: absent      Suprapubic free fluid: absent      Cardiac wall motion: identified      Pericardial effusion: absent    Interpretation:     Impressions: negative

## 2022-08-05 NOTE — QUICK NOTE
Cervical Collar Clearance: The patient had a CT scan of the cervical spine demonstrating no acute injury  On exam, the patient had no midline point tenderness or paresthesias/numbness/weakness in the extremities  The patient had full range of motion (was then able to flex, extend, and rotate head laterally) without pain  There were no distracting injuries and the patient was not intoxicated  The patient's cervical spine was cleared radiologically and clinically  Cervical collar removed at this time       Belkis Lovell MD  8/5/2022 6:06 AM

## 2022-08-05 NOTE — TELEPHONE ENCOUNTER
[facility] calling in stating that they did not receive the patient's order for Bactrim or Flomax yesterday, Per chart medications were submitting correctly to pharmacy   Verbally confirmed orders with pharmacist

## 2022-08-05 NOTE — RESULT ENCOUNTER NOTE
This result was discussed in clinic  Please see enc dated 8/5/2022 with Dr Kathya Thompson for plan and details

## 2022-08-05 NOTE — H&P
H&P - Trauma   Deb Loya 74 y o  male MRN: [id number]  Unit/Bed#: ED 16 Encounter: [id number]    Trauma Alert: Level B   Model of Arrival: Self    Trauma Team: Attending Ivelisse Salamanca, Residents [id number] and Fellow Hayde  Consultants:     None     Assessment/Plan   Active Problems / Assessment:   · S/p mechanical fall  · RUE skin tear     Plan:   · Workup negative for any acute injuries  · RUE skin tear repaired with steri-strips  · Patient has previously scheduled appointment with PCP  · Discharge home from ED    History of Present Illness     Chief Complaint: none  Mechanism:Fall     HPI:    Deb Loya is a 74 y o  male with complex PMHx (as below) who presents after sustaining a mechanical fall at home  Patient was tiling his kitchen when he fell  He did strike his head but denies any LOC  He denies any presyncopal symptoms prior to falling  He currently denies any complaints  He takes plavix daily  Cervical collar applied in trauma bay given mechanism  Review of Systems   All other systems reviewed and are negative  12-point, complete review of systems was reviewed and negative except as stated above       Historical Information     Past Medical History:   Diagnosis Date    [facility]ism [facility]     Cerebral artery aneurysm     Change in mental state     last assessed 5/18/15; resolved 10/27/15    Diabetes mellitus ([facility] )     Drug dependence ([facility] )     Fatigue     last assessed 1/26/15; resolved 5/24/16    Hepatitis [address] discharge follow-up 12/21/2018    Kidney disease     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[facility] [facility] )     [facility] transplant recipient [facility])     Renal disorder     Subdural hygroma     2/27/14; resolved 7/28/15    Thrombocytopenia ([facility] ) 9/20/2017     Past Surgical History:   Procedure Laterality Date    BRAIN SURGERY  02/12/2014    left frontotemporal 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    CATARACT EXTRACTION      CHOLECYSTECTOMY      FL LUMBAR PUNCTURE DIAGNOSTIC  12/14/2018    IR NEPHROSTOMY TUBE CHECK/CHANGE/REPOSITION/REINSERTION/UPSIZE  7/29/2022    IR NEPHROSTOMY TUBE PLACEMENT  5/28/2022    IR PICC LINE  12/14/2018    IR PORT PLACEMENT  6/23/2022    LIVER TRANSPLANTATION      ROTATOR CUFF REPAIR      SHOULDER SURGERY      TRANSURETHRAL RESECTION OF BLADDER TUMOR N/A 5/26/2022    Procedure: TRANSURETHRAL RESECTION OF BLADDER TUMOR (TURBT); Surgeon: Trent Sorensen MD;  Location: [facility];  Service: Urology        Social History     Tobacco Use    Smoking status: Never Smoker    Smokeless tobacco: Never Used    Tobacco comment: former smoker per allscripts    Vaping Use    Vaping Use: Never used   Substance Use Topics    Alcohol use: Not Currently    Drug use: No     Comment: remotely quit drug use per allscripts      Immunization History   Administered Date(s) Administered    COVID-19 MODERNA VACC 0 5 ML IM 01/13/2021, 02/10/2021, 08/23/2021    INFLUENZA 09/24/2009, 10/04/2010, 09/27/2014, 10/27/2015, 10/01/2016, 11/10/2016, 10/02/2017, 10/04/2021    Influenza Split High Dose Preservative Free IM 10/27/2015, 10/02/2017    Influenza, high dose seasonal 0 7 mL 12/18/2018, 09/19/2019, 10/03/2020    Influenza, seasonal, injectable 11/12/2012, 09/11/2013, 10/01/2016    Pneumococcal Conjugate 13-Valent 10/27/2015, 12/29/2016    Pneumococcal Polysaccharide PPV23 09/03/2013    Tdap 10/27/2015    Zoster 06/04/2013     Last Tetanus: updated in ED  Family History: Non-contributory    1  Before the illness or injury that brought you to the Emergency, did you need someone to help you on a regular basis? 0=No   2  Since the illness or injury that brought you to the Emergency, have you needed more help than usual to take care of yourself? 0=No   3  Have you been hospitalized for one or more nights during the past 6 months (excluding a stay in the Emergency Department)? 0=No   4  In general, do you see well? 0=Yes   5   In general, do you have serious problems with your memory? 0=No   6  Do you take more than three different medications everyday? 1=Yes   TOTAL   1     Did you order a geriatric consult if the score was 2 or greater?: n/a     Meds/Allergies   all current active meds have been reviewed  Allergies have not been reviewed; Allergies   Allergen Reactions    Tequin [Gatifloxacin] Rash    Ciprofloxacin     Iv Contrast [Iodinated Diagnostic Agents]     Levofloxacin Rash    Lipitor [Atorvastatin] Rash     Rash and itching    Omnipaque [Iohexol]        Objective   Initial Vitals:   Temperature: 97 5 °F (36 4 °C) (08/05/22 0503)  Pulse: 69 (08/05/22 0503)  Respirations: 18 (08/05/22 0503)  Blood Pressure: (!) 140/6 (08/05/22 0503)    Primary Survey:   Airway:        Status: patent; Hospital Interventions: none  Breathing:        Pre-hospital Interventions: none       Effort: normal       Right breath sounds: normal       Left breath sounds: normal  Circulation:        Rhythm: regular       Rate: regular   Right Pulses Left Pulses    R radial: 2+  R femoral: 2+  R pedal: 2+     L radial: 2+  L femoral: 2+  L pedal: 2+       Disability:        GCS: Eye: 4; Verbal: 5 Motor: 6 Total: 15       Right Pupil: round;  reactive         Left Pupil:  reactive      R Motor Strength L Motor Strength    R : 5/5  R dorsiflex: 5/5  R plantarflex: 5/5 L : 5/5  L dorsiflex: 5/5  L plantarflex: 5/5        Sensory:  No sensory deficit  Exposure:       Completed: Yes      Secondary Survey:  Physical Exam  Vitals reviewed  Constitutional:       Appearance: Normal appearance  HENT:      Head: Normocephalic  Nose: Nose normal    Eyes:      Extraocular Movements: Extraocular movements intact  Pupils: Pupils are equal, round, and reactive to light  Cardiovascular:      Rate and Rhythm: Normal rate and regular rhythm  Pulses: Normal pulses  Pulmonary:      Effort: Pulmonary effort is normal  No respiratory distress  Breath sounds: Normal breath sounds  Abdominal:      General: There is no distension  Palpations: Abdomen is soft  Tenderness: There is no abdominal tenderness  Comments: chrevron surgical scar from prior OLT (21 years ago)   Genitourinary:     Penis: Normal        Comments: R percutaneous nephrostomy tube in place  Musculoskeletal:         General: No swelling or tenderness  Normal range of motion  Cervical back: Normal range of motion and neck supple  No rigidity or tenderness  Skin:     General: Skin is warm and dry  Capillary Refill: Capillary refill takes less than 2 seconds  Coloration: Skin is not jaundiced  Neurological:      General: No focal deficit present  Mental Status: He is alert and oriented to person, place, and time  GCS: GCS eye subscore is 4  GCS verbal subscore is 5  GCS motor subscore is 6  Cranial Nerves: Cranial nerves are intact  Sensory: Sensation is intact  Motor: Motor function is intact           Invasive Devices  Report    Central Venous Catheter Line  Duration           Port A Cath 06/23/22 Right Chest 42 days          Peripheral Intravenous Line  Duration           Peripheral IV 08/05/22 Right Arm <1 day          Drain  Duration           Nephrostomy Right 8 5 Fr  68 days              Lab Results:   Results: I have personally reviewed all pertinent laboratory/tests results, BMP/CMP:   Lab Results   Component Value Date    SODIUM 135 08/05/2022    K 4 3 08/05/2022     08/05/2022    CO2 21 08/05/2022    CO2 21 08/05/2022    BUN 55 (H) 08/05/2022    CREATININE 2 87 (H) 08/05/2022    GLUCOSE 179 (H) 08/05/2022    CALCIUM 8 8 08/05/2022    EGFR 20 08/05/2022   , CBC:   Lab Results   Component Value Date    WBC 6 62 08/05/2022    HGB 8 3 (L) 08/05/2022    HCT 27 8 (L) 08/05/2022    MCV 80 (L) 08/05/2022    PLT 83 (L) 08/05/2022    MCH 23 9 (L) 08/05/2022    MCHC 29 9 (L) 08/05/2022    RDW 18 0 (H) 08/05/2022 MPV 12 5 08/05/2022    NRBC 0 08/05/2022    and Coagulation: No results found for: PT, INR, APTT    Imaging Results: I have personally reviewed pertinent reports  and I have personally reviewed pertinent films in PACS  Chest Xray(s): negative for acute findings   FAST exam(s): negative for acute findings   CT Scan(s): negative for acute findings   Additional Xray(s): N/A     Other Studies: I have personally reviewed pertinent reports       Code Status: Prior  Advance Directive and Living Will:      Power of :    POLST:

## 2022-08-06 LAB
BACTERIA UR CULT: ABNORMAL
BACTERIA UR CULT: ABNORMAL

## 2022-08-08 ENCOUNTER — APPOINTMENT (OUTPATIENT)
Dept: RADIATION ONCOLOGY | Facility: HOSPITAL | Age: 72
End: 2022-08-08
Payer: COMMERCIAL

## 2022-08-08 PROCEDURE — 77387 GUIDANCE FOR RADJ TX DLVR: CPT | Performed by: STUDENT IN AN ORGANIZED HEALTH CARE EDUCATION/TRAINING PROGRAM

## 2022-08-08 PROCEDURE — 77412 RADIATION TX DELIVERY LVL 3: CPT | Performed by: STUDENT IN AN ORGANIZED HEALTH CARE EDUCATION/TRAINING PROGRAM

## 2022-08-08 PROCEDURE — G6002 STEREOSCOPIC X-RAY GUIDANCE: HCPCS | Performed by: STUDENT IN AN ORGANIZED HEALTH CARE EDUCATION/TRAINING PROGRAM

## 2022-08-09 ENCOUNTER — APPOINTMENT (OUTPATIENT)
Dept: RADIATION ONCOLOGY | Facility: HOSPITAL | Age: 72
End: 2022-08-09
Payer: COMMERCIAL

## 2022-08-09 PROCEDURE — 77387 GUIDANCE FOR RADJ TX DLVR: CPT | Performed by: INTERNAL MEDICINE

## 2022-08-09 PROCEDURE — 77336 RADIATION PHYSICS CONSULT: CPT | Performed by: RADIOLOGY

## 2022-08-09 PROCEDURE — 77412 RADIATION TX DELIVERY LVL 3: CPT | Performed by: INTERNAL MEDICINE

## 2022-08-09 PROCEDURE — G6002 STEREOSCOPIC X-RAY GUIDANCE: HCPCS | Performed by: INTERNAL MEDICINE

## 2022-08-10 ENCOUNTER — APPOINTMENT (OUTPATIENT)
Dept: RADIATION ONCOLOGY | Facility: HOSPITAL | Age: 72
End: 2022-08-10
Attending: RADIOLOGY
Payer: COMMERCIAL

## 2022-08-10 PROCEDURE — 77412 RADIATION TX DELIVERY LVL 3: CPT | Performed by: RADIOLOGY

## 2022-08-10 PROCEDURE — 77280 THER RAD SIMULAJ FIELD SMPL: CPT | Performed by: RADIOLOGY

## 2022-08-10 PROCEDURE — 77427 RADIATION TX MANAGEMENT X5: CPT | Performed by: RADIOLOGY

## 2022-08-11 ENCOUNTER — APPOINTMENT (OUTPATIENT)
Dept: RADIATION ONCOLOGY | Facility: HOSPITAL | Age: 72
End: 2022-08-11
Payer: COMMERCIAL

## 2022-08-11 ENCOUNTER — OFFICE VISIT (OUTPATIENT)
Dept: FAMILY MEDICINE CLINIC | Facility: CLINIC | Age: 72
End: 2022-08-11
Payer: COMMERCIAL

## 2022-08-11 VITALS
HEART RATE: 82 BPM | OXYGEN SATURATION: 100 % | TEMPERATURE: 97.6 F | DIASTOLIC BLOOD PRESSURE: 60 MMHG | BODY MASS INDEX: 21.79 KG/M2 | RESPIRATION RATE: 18 BRPM | SYSTOLIC BLOOD PRESSURE: 110 MMHG | WEIGHT: 123 LBS | HEIGHT: 63 IN

## 2022-08-11 DIAGNOSIS — E11.22 TYPE 2 DIABETES MELLITUS WITH STAGE 3B CHRONIC KIDNEY DISEASE, WITH LONG-TERM CURRENT USE OF INSULIN (HCC): Primary | ICD-10-CM

## 2022-08-11 DIAGNOSIS — Z79.4 TYPE 2 DIABETES MELLITUS WITH STAGE 3B CHRONIC KIDNEY DISEASE, WITH LONG-TERM CURRENT USE OF INSULIN (HCC): Primary | ICD-10-CM

## 2022-08-11 DIAGNOSIS — G47.01 INSOMNIA DUE TO MEDICAL CONDITION: ICD-10-CM

## 2022-08-11 DIAGNOSIS — N18.32 TYPE 2 DIABETES MELLITUS WITH STAGE 3B CHRONIC KIDNEY DISEASE, WITH LONG-TERM CURRENT USE OF INSULIN (HCC): Primary | ICD-10-CM

## 2022-08-11 DIAGNOSIS — R63.4 WEIGHT LOSS: ICD-10-CM

## 2022-08-11 DIAGNOSIS — C67.0 MALIGNANT NEOPLASM OF TRIGONE OF URINARY BLADDER (HCC): ICD-10-CM

## 2022-08-11 PROCEDURE — 77387 GUIDANCE FOR RADJ TX DLVR: CPT | Performed by: RADIOLOGY

## 2022-08-11 PROCEDURE — 99214 OFFICE O/P EST MOD 30 MIN: CPT | Performed by: FAMILY MEDICINE

## 2022-08-11 PROCEDURE — 77412 RADIATION TX DELIVERY LVL 3: CPT | Performed by: RADIOLOGY

## 2022-08-11 PROCEDURE — 3066F NEPHROPATHY DOC TX: CPT | Performed by: INTERNAL MEDICINE

## 2022-08-11 RX ORDER — ISOPROPYL ALCOHOL 0.75 G/1
SWAB TOPICAL
Status: ON HOLD | COMMUNITY
Start: 2022-07-20 | End: 2022-09-05 | Stop reason: CLARIF

## 2022-08-11 RX ORDER — TRIAMCINOLONE ACETONIDE 1 MG/G
CREAM TOPICAL 2 TIMES DAILY
COMMUNITY
Start: 2022-08-04 | End: 2022-09-12

## 2022-08-11 NOTE — PROGRESS NOTES
Assessment/Plan:    1  Type 2 diabetes mellitus with stage 3b chronic kidney disease, with long-term current use of insulin ([facility] [address] )  Assessment & Plan:  Would like dexcom  Or meter that covered by insurance  Lab Results   Component Value Date    HGBA1C 7 2 (H) 02/10/2022       Orders:  -     Ambulatory referral to clinical pharmacy; Future    2  Malignant neoplasm of trigone of urinary bladder (HCC)  Assessment & Plan:  Getting radiation treatment    Orders:  -     Nutritional Supplements (Ensure Complete Shake) LIQD; Take 1 Can by mouth 3 (three) times a day    3  Weight loss  Assessment & Plan:  10 pounds since starting radiation  Will write for ensure    Orders:  -     Nutritional Supplements (Ensure Complete Shake) LIQD; Take 1 Can by mouth 3 (three) times a day    4  Insomnia due to medical condition  Assessment & Plan:  Discussed overuse of meds  Will not abuse meds  Slept better recently              There are no Patient Instructions on file for this visit  No follow-ups on file  Subjective:      Patient ID: Idania Cummins is a 74 y o  male      Chief Complaint   Patient presents with    Follow-up     Patient here to discuss his new cancer diagnosis        Here with wife  Has lost 10 pounds  Taking ensure  occ incontinece  Would like dexcom for diabetes  Has 10 radiaciton treatments left  Slept better the last 2 days   Several falls due to lack of sleep      The following portions of the patient's history were reviewed and updated as appropriate: allergies, current medications, past family history, past medical history, past social history, past surgical history and problem list     Review of Systems      Current Outpatient Medications   Medication Sig Dispense Refill    acetaminophen (TYLENOL) 500 mg tablet Take 1 tablet (500 mg total) by mouth every 6 (six) hours as needed for mild pain 30 tablet 0    Alcohol Swabs (B-D SINGLE USE SWABS REGULAR) PADS USE 2-3 TIMES DAILY AS NEEDED      Blood Glucose Calibration (OT ULTRA/FASTTK CNTRL SOLN) SOLN USE AS DIRECTED 1 each 0    clopidogrel (PLAVIX) 75 mg tablet Take 1 tablet (75 mg total) by mouth daily 90 tablet 3    Comfort EZ Pen Needles 32G X 4 MM MISC USE 3-4 AS DIRECTED 100 each 5    Continuous Blood Gluc  (FreeStyle Frank 14 [address]) NATALIA Use 1 Device continuous 1 each 0    Continuous Blood Gluc Sensor (FreeStyle Frank 14 Day Sensor) MISC Use 1 Device 4 (four) times a day 1 each 5    insulin detemir (Levemir FlexTouch) 100 Units/mL injection pen Inject 18 Units under the skin daily at bedtime 15 mL 0    INSULIN SYRINGE 1CC/29G 29G X 1/2" 1 ML MISC by Does not apply route      Lancet Devices (Lancing Device) MISC USE AS DIRECTED 1 each 0    Lancets MISC by Does not apply route      loperamide (IMODIUM) 2 mg capsule Take 1 capsule (2 mg total) by mouth 2 (two) times a day as needed for diarrhea [place] carmen tableta dos veces por cynthia si tiene diarrea 20 capsule 0    Nutritional Supplements (Ensure Complete Shake) LIQD Take 1 Can by mouth 3 (three) times a day 237 mL 5    Nutritional Supplements (Glucerna Shake) LIQD Take 1 Bottle by mouth 3 (three) times a day 16316 mL 3    ondansetron (ZOFRAN) 4 mg tablet Take 1 tablet (4 mg total) by mouth every 8 (eight) hours as needed for nausea or vomiting 20 tablet 1    OneTouch Ultra test strip TEST UP TO 3 TIMES A  each 0    phenazopyridine (PYRIDIUM) 100 mg tablet Take 1 tablet (100 mg total) by mouth 3 (three) times a week [place] en viernes, en lunes, en miercoles, y despues quitalo 3 tablet 0    pregabalin (LYRICA) 50 mg capsule TAKE 1 CAPSULE (50 MG TOTAL) BY MOUTH 3 (THREE) TIMES A DAY 90 capsule 5    rosuvastatin (CRESTOR) 40 MG tablet TAKE ONE (1) TABLET BY MOUTH DAILY 30 tablet 5    sodium chloride, PF, 0 9 % 10 mL by Intracatheter route daily Intracatheter flushing daily   May substitute prefilled syringe with normal saline 10 mL vials, 10 mL syringes, and 18 g blunt needles 300 mL 2  sulfamethoxazole-trimethoprim (BACTRIM DS) 800-160 mg per tablet Take 1 tablet by mouth 2 (two) times a day for 7 days 14 tablet 0    tacrolimus (PROGRAF) 1 mg capsule Take 1 capsule (1 mg total) by mouth every 12 (twelve) hours 180 capsule 3    tamsulosin (FLOMAX) 0 4 mg Take 1 capsule (0 4 mg total) by mouth daily with dinner 90 capsule 0    temazepam (RESTORIL) 30 mg capsule TAKE 1 CAPSULE (30 MG TOTAL) BY MOUTH DAILY AT BEDTIME 30 capsule 5    triamcinolone (KENALOG) 0 1 % cream Apply topically 2 (two) times a day Apply to affected area      zolpidem (AMBIEN) 10 mg tablet TAKE 1 TABLET (10 MG TOTAL) BY MOUTH DAILY AT BEDTIME 30 tablet 3    Incontinence Supply Disposable (Incontinence Brief Medium) MISC Use 4 (four) times a day 120 each 5     Current Facility-Administered Medications   Medication Dose Route Frequency Provider Last Rate Last Admin    lidocaine (URO-JET, GLYDO) 2 % urethral/mucosal gel 1 application  1 application Urethral Daily PRN Mahin Pimentel MD           Objective:    /60 (BP Location: Left arm, Patient Position: Sitting, Cuff Size: Standard)   Pulse 82   Temp 97 6 °F (36 4 °C) (Tympanic)   Resp 18   Ht 5' 3" (1 6 m)   Wt 55 8 kg (123 lb)   SpO2 100%   BMI 21 79 kg/m²        Physical Exam  Vitals and nursing note reviewed  Constitutional:       Appearance: Normal appearance  HENT:      Head: Normocephalic and atraumatic  Cardiovascular:      Rate and Rhythm: Normal rate and regular rhythm  Pulses: Normal pulses  Heart sounds: Normal heart sounds  Pulmonary:      Effort: Pulmonary effort is normal       Breath sounds: Normal breath sounds  Neurological:      General: No focal deficit present  Mental Status: He is alert and oriented to person, place, and time  Psychiatric:         Mood and Affect: Mood normal          Behavior: Behavior normal          Thought Content:  Thought content normal          Judgment: Judgment normal  Fidencio Marlow DO

## 2022-08-11 NOTE — ASSESSMENT & PLAN NOTE
Would like dexcom  Or meter that covered by insurance  Lab Results   Component Value Date    HGBA1C 7 2 (H) 02/10/2022

## 2022-08-12 ENCOUNTER — OFFICE VISIT (OUTPATIENT)
Dept: PALLIATIVE MEDICINE | Facility: CLINIC | Age: 72
End: 2022-08-12
Payer: COMMERCIAL

## 2022-08-12 ENCOUNTER — APPOINTMENT (OUTPATIENT)
Dept: RADIATION ONCOLOGY | Facility: HOSPITAL | Age: 72
End: 2022-08-12
Payer: COMMERCIAL

## 2022-08-12 VITALS
OXYGEN SATURATION: 99 % | HEART RATE: 80 BPM | DIASTOLIC BLOOD PRESSURE: 52 MMHG | WEIGHT: 124.78 LBS | BODY MASS INDEX: 22.1 KG/M2 | TEMPERATURE: 97 F | SYSTOLIC BLOOD PRESSURE: 94 MMHG | RESPIRATION RATE: 18 BRPM

## 2022-08-12 DIAGNOSIS — C67.0 MALIGNANT NEOPLASM OF TRIGONE OF URINARY BLADDER (HCC): Primary | ICD-10-CM

## 2022-08-12 DIAGNOSIS — N30.00 ACUTE CYSTITIS WITHOUT HEMATURIA: ICD-10-CM

## 2022-08-12 DIAGNOSIS — N34.2 URETHRITIS: ICD-10-CM

## 2022-08-12 PROBLEM — R30.0 BURNING WITH URINATION: Status: RESOLVED | Noted: 2022-08-12 | Resolved: 2022-08-12

## 2022-08-12 PROBLEM — R30.0 BURNING WITH URINATION: Status: ACTIVE | Noted: 2022-08-12

## 2022-08-12 PROCEDURE — 77387 GUIDANCE FOR RADJ TX DLVR: CPT | Performed by: STUDENT IN AN ORGANIZED HEALTH CARE EDUCATION/TRAINING PROGRAM

## 2022-08-12 PROCEDURE — 77412 RADIATION TX DELIVERY LVL 3: CPT | Performed by: STUDENT IN AN ORGANIZED HEALTH CARE EDUCATION/TRAINING PROGRAM

## 2022-08-12 PROCEDURE — 99213 OFFICE O/P EST LOW 20 MIN: CPT | Performed by: FAMILY MEDICINE

## 2022-08-12 NOTE — PROGRESS NOTES
Outpatient Follow-Up - Palliative and Supportive Care   Cris Martin 74 y o  male [id number]    Assessment & Plan  Problem List Items Addressed This Visit        Genitourinary    Malignant neoplasm of trigone of urinary bladder ([address] ) - Primary    UTI (urinary tract infection)    Urethritis      Assessment:  1) Malignant neoplasm of the trigone urinary bladder  -Currently has 9 more days left between now and his final radiation per patient    -Per 7/14/2022 Oncology documentation - patient did not want to proceed with additional gemcitabine   -Suspect patient's urinary burning is a sequale of radiation as patient states this only began after starting treatment    2) UTI - gram negative   -patient has completed Bactrim and state still has burning with urination  -Denies any fevers, chills, shortness of breath, flank pain or hematuria    3) Urethritis  -suspect possible association with radiation as patient's symptoms began after radiation  -Patient aware of the possible sequela with radiation especially in the pelvic region which could affect tissue in the surrounding areas  -No pain other than burning when urinating      Medications adjusted this encounter:  Requested Prescriptions      No prescriptions requested or ordered in this encounter     No orders of the defined types were placed in this encounter  There are no discontinued medications  Cris Martin was seen today for symptoms and planning cares related to above illnesses  An attempt to review the PDMP was made but unable to review today  They are invited to continue to follow with us  If there are questions or concerns, please contact us through our clinic/answering service 24 hours a day, seven days a week      Criselda Sanchez DO  [facility] Palliative and Supportive [facility]        Visit Information    Accompanied By: Significant other Myrna Baird)    Source of History: Self and significant other, Rosibel  History Limitations: None    History of Present Illness      Steve Diaz is a 74 y o  male who presents in follow up of symptoms related to bladder cancer, UTI, and burning with urination  Pertinent issues include: symptom management, disease process education and discussion of prognosis, advance care planning      Mr Shireen Miller is here for follow-up of his burning with urination  States he has completed the antibiotics but has noted no overall change with the dysuria  States no pain at any other times and it does not affect quality of life  States he did not feel any improvement with pyridium  He did see his PCP yesterday and was told about his symptoms likely related with his radiation (in which I was in agreement and reinforced the possibility of why he still has ongoing symptoms)  I did discuss that since he has his antibiotics and he does not have any signs or symptoms of infection at this time, that this is at least reassuring  I did discuss continuing following Urology for any further recommendations at this time and patient states agreement  We did touch upon Advanced care planning during this visit  Patient and significant other (of 37 years with 1 child from the relationship) state they were working on the paperwork for his living will  Patient states he wants José Miguel Deluna to be his medical decision maker should he lose competency  He states he has 5 other children from previous relationship and will plan to contact them to inform them that José Miguel Deluna would be in fact the medical decision maker for the patient  Past medical, surgical, social, and family histories are reviewed and pertinent updates are made  Review of Systems   Constitutional: Negative for chills, decreased appetite, diaphoresis, fever, weight gain and weight loss  Eyes: Negative for visual disturbance  Cardiovascular: Negative for chest pain and leg swelling  Respiratory: Negative for cough and shortness of breath      Musculoskeletal: Negative for joint pain and myalgias  Gastrointestinal: Negative for bloating, abdominal pain, bowel incontinence, nausea and vomiting  Genitourinary: Negative for bladder incontinence, flank pain and hematuria  Burning with urination   Neurological: Negative for numbness  Psychiatric/Behavioral: The patient is not nervous/anxious  Vital Signs    BP 94/52 (BP Location: Left arm, Patient Position: Sitting, Cuff Size: Standard)   Pulse 80   Temp (!) 97 °F (36 1 °C) (Temporal)   Resp 18   Wt 56 6 kg (124 lb 12 5 oz)   SpO2 99%   BMI 22 10 kg/m²     Physical Exam and Objective Data  Physical Exam  Vitals reviewed  Constitutional:       General: He is not in acute distress  Appearance: He is ill-appearing (chronically ill-appearing)  He is not diaphoretic  Comments: Thin body habitus   HENT:      Head: Normocephalic  Cardiovascular:      Rate and Rhythm: Normal rate and regular rhythm  Heart sounds: Normal heart sounds  No murmur heard  Pulmonary:      Effort: Pulmonary effort is normal  No respiratory distress  Breath sounds: Normal breath sounds  No stridor  No wheezing or rales  Abdominal:      General: Abdomen is flat  There is no distension  Palpations: Abdomen is soft  Tenderness: There is no abdominal tenderness  Musculoskeletal:         General: No swelling or deformity  Skin:     General: Skin is warm and dry  Findings: Bruising present  Neurological:      General: No focal deficit present  Mental Status: He is alert and oriented to person, place, and time  Mental status is at baseline  Gait: Gait normal    Psychiatric:         Mood and Affect: Mood normal          Behavior: Behavior normal          Thought Content: Thought content normal          Judgment: Judgment normal            Radiology and Laboratory:  I personally reviewed and interpreted the following results      30 minutes was spent face to face with Cris Martin and his partner with greater than 50% of the time spent in counseling or coordination of care including discussions of etiology of diagnosis, risks and benefits of treatment, instructions for disease self management, treatment instructions, follow up requirements and patient and family counseling/involvement in care  All of the patient's or agent's questions were answered during this discussion

## 2022-08-15 ENCOUNTER — HOSPITAL ENCOUNTER (OUTPATIENT)
Dept: INFUSION CENTER | Facility: HOSPITAL | Age: 72
Discharge: HOME | End: 2022-08-15
Payer: COMMERCIAL

## 2022-08-15 ENCOUNTER — APPOINTMENT (OUTPATIENT)
Dept: RADIATION ONCOLOGY | Facility: HOSPITAL | Age: 72
End: 2022-08-15
Payer: COMMERCIAL

## 2022-08-15 VITALS — TEMPERATURE: 97.6 F

## 2022-08-15 DIAGNOSIS — Z95.828 PORT-A-CATH IN PLACE: Primary | ICD-10-CM

## 2022-08-15 PROCEDURE — 77387 GUIDANCE FOR RADJ TX DLVR: CPT | Performed by: INTERNAL MEDICINE

## 2022-08-15 PROCEDURE — 96523 IRRIG DRUG DELIVERY DEVICE: CPT

## 2022-08-15 PROCEDURE — 77412 RADIATION TX DELIVERY LVL 3: CPT | Performed by: INTERNAL MEDICINE

## 2022-08-15 NOTE — PROGRESS NOTES
Patient's port accessed and blood return noted  Port flushed and locked with NSS and de-accessed  Patient declined AVS, aware of next appointment

## 2022-08-16 ENCOUNTER — CLINICAL SUPPORT (OUTPATIENT)
Dept: FAMILY MEDICINE CLINIC | Facility: CLINIC | Age: 72
End: 2022-08-16

## 2022-08-16 ENCOUNTER — TELEPHONE (OUTPATIENT)
Dept: NUTRITION | Facility: CLINIC | Age: 72
End: 2022-08-16

## 2022-08-16 ENCOUNTER — APPOINTMENT (OUTPATIENT)
Dept: RADIATION ONCOLOGY | Facility: HOSPITAL | Age: 72
End: 2022-08-16
Payer: COMMERCIAL

## 2022-08-16 DIAGNOSIS — E11.22 TYPE 2 DIABETES MELLITUS WITH STAGE 3B CHRONIC KIDNEY DISEASE, WITH LONG-TERM CURRENT USE OF INSULIN (HCC): ICD-10-CM

## 2022-08-16 DIAGNOSIS — Z79.4 TYPE 2 DIABETES MELLITUS WITH STAGE 3B CHRONIC KIDNEY DISEASE, WITH LONG-TERM CURRENT USE OF INSULIN (HCC): ICD-10-CM

## 2022-08-16 DIAGNOSIS — N18.32 TYPE 2 DIABETES MELLITUS WITH STAGE 3B CHRONIC KIDNEY DISEASE, WITH LONG-TERM CURRENT USE OF INSULIN (HCC): ICD-10-CM

## 2022-08-16 PROCEDURE — 77387 GUIDANCE FOR RADJ TX DLVR: CPT | Performed by: STUDENT IN AN ORGANIZED HEALTH CARE EDUCATION/TRAINING PROGRAM

## 2022-08-16 PROCEDURE — 77412 RADIATION TX DELIVERY LVL 3: CPT | Performed by: STUDENT IN AN ORGANIZED HEALTH CARE EDUCATION/TRAINING PROGRAM

## 2022-08-16 PROCEDURE — 77336 RADIATION PHYSICS CONSULT: CPT | Performed by: RADIOLOGY

## 2022-08-16 NOTE — PROGRESS NOTES
[facility]  Tanvi Treviño Pharmacist    Antolin Bennett is a 74 y o  male who was referred to the pharmacist for CGM education and management, referred by Reshma Cardenas DO   Assessment/ Plan     Patient not good candidate for CGM given ongoing radiation therapy  Type 2 Diabetes:  Goal A1c <8 based on ADA co-morbidities  Most recent A1c below goal 7 2 February 2022   Reported Home SMBG  none  Complications:  Microvascular complications:neuropathy, CKD   Macrovascular complications: none    Follow-up: prn      Subjective/Objective   Patient interested in CGM    Lab Results   Component Value Date    HGBA1C 7 2 (H) 02/10/2022    HGBA1C 7 5 (H) 07/26/2021    HGBA1C 8 2 (H) 03/29/2021         ASCVD Risk:  The ASCVD Risk score (Len Cm et al , 2013) failed to calculate for the following reasons: The valid total cholesterol range is 130 to 320 mg/dL     Vitals: There were no vitals filed for this visit      Labs:    Lab Results   Component Value Date    SODIUM 135 08/05/2022    K 4 3 08/05/2022    EGFR 20 08/05/2022    CREATININE 2 87 (H) 08/05/2022    GLUF 71 04/05/2022    LMLMPAGW28 904 (H) 12/16/2014    MICROALBCRE 62 (H) 02/10/2022         Pharmacist Tracking Tool     Pharmacist Tracking Tool  Reason For Outreach: Embedded Pharmacist  Demographics:  Intervention Method: Phone  Type of Intervention: New  Topics Addressed: Diabetes  Pharmacologic Interventions: N/A  Non-Pharmacologic Interventions: Care coordination and Personal CGM  Time:  Direct Patient Care: 15 mins  Care Coordination: 30 mins  Recommendation Recipient: Patient/Caregiver  Outcome: Accepted

## 2022-08-16 NOTE — TELEPHONE ENCOUNTER
Kathy Ortiz  Would recommend cystoscopy 3 months after completion of radiation  Nephrostomy tube may need to be in place indefinitely given cancer obstruction and ongoing radiation which may further scar down the open  Can be discussed at future visit    Glad plan for exchange is in place

## 2022-08-16 NOTE — TELEPHONE ENCOUNTER
Received VM from wife, Kike Antunez, asking about Glucerna shakes for [facility]  Called Rosibel back, no answer  Left VM encouraging them to call back to address their question  RD contact information provided

## 2022-08-16 NOTE — TELEPHONE ENCOUNTER
Patient and his wife stopped in at the [facility] office today thinking he had an appointment with Dr Kristy Peraza  Patient received a text message stating [place] Bhatt had appointment today at [phone]  Apologized to both for the miscommunication  Patient receiving radiation treatments and has about 8-9 more treatments  Patient's chemotherapy discontinued secondary to side effects of treatment  Wife asking about her 's nephrostomy tube  How long does the tube need to remain  Patient scheduled for exchange on 8/31/2022  Will ask Dr Kristy Peraza and then call patient back

## 2022-08-17 ENCOUNTER — APPOINTMENT (OUTPATIENT)
Dept: RADIATION ONCOLOGY | Facility: HOSPITAL | Age: 72
End: 2022-08-17
Attending: RADIOLOGY
Payer: COMMERCIAL

## 2022-08-17 PROCEDURE — 77412 RADIATION TX DELIVERY LVL 3: CPT | Performed by: RADIOLOGY

## 2022-08-17 PROCEDURE — 77387 GUIDANCE FOR RADJ TX DLVR: CPT | Performed by: RADIOLOGY

## 2022-08-17 NOTE — TELEPHONE ENCOUNTER
Patient wife called asking if there is a pharmacy that we know of that carries the shakes that Dr Claude Coupe prescribed the patient

## 2022-08-17 NOTE — TELEPHONE ENCOUNTER
Called the patient and left a voicemail for Keyur Petty asking to call the office to schedule an earlier cysto than January and discuss nephrostomy tube

## 2022-08-18 ENCOUNTER — APPOINTMENT (OUTPATIENT)
Dept: RADIATION ONCOLOGY | Facility: HOSPITAL | Age: 72
End: 2022-08-18
Payer: COMMERCIAL

## 2022-08-18 ENCOUNTER — PATIENT OUTREACH (OUTPATIENT)
Dept: HEMATOLOGY ONCOLOGY | Facility: CLINIC | Age: 72
End: 2022-08-18

## 2022-08-18 PROCEDURE — 77427 RADIATION TX MANAGEMENT X5: CPT | Performed by: RADIOLOGY

## 2022-08-18 PROCEDURE — 77387 GUIDANCE FOR RADJ TX DLVR: CPT | Performed by: INTERNAL MEDICINE

## 2022-08-18 PROCEDURE — 77412 RADIATION TX DELIVERY LVL 3: CPT | Performed by: INTERNAL MEDICINE

## 2022-08-18 NOTE — PROGRESS NOTES
MSW made outreach to pt's wife to offer support and to assess any needs  MSW received her voicemail  A detailed message was left, along with a contact number and she was encouraged to return the call

## 2022-08-19 ENCOUNTER — TELEPHONE (OUTPATIENT)
Dept: NEPHROLOGY | Facility: CLINIC | Age: 72
End: 2022-08-19

## 2022-08-19 ENCOUNTER — APPOINTMENT (OUTPATIENT)
Dept: RADIATION ONCOLOGY | Facility: HOSPITAL | Age: 72
End: 2022-08-19
Payer: COMMERCIAL

## 2022-08-19 ENCOUNTER — OFFICE VISIT (OUTPATIENT)
Dept: PALLIATIVE MEDICINE | Facility: CLINIC | Age: 72
End: 2022-08-19
Payer: COMMERCIAL

## 2022-08-19 ENCOUNTER — HOSPITAL ENCOUNTER (OUTPATIENT)
Dept: INFUSION CENTER | Facility: HOSPITAL | Age: 72
Discharge: HOME | End: 2022-08-19
Payer: COMMERCIAL

## 2022-08-19 ENCOUNTER — APPOINTMENT (OUTPATIENT)
Dept: LAB | Facility: CLINIC | Age: 72
End: 2022-08-19
Payer: COMMERCIAL

## 2022-08-19 VITALS
BODY MASS INDEX: 22.42 KG/M2 | SYSTOLIC BLOOD PRESSURE: 112 MMHG | OXYGEN SATURATION: 99 % | DIASTOLIC BLOOD PRESSURE: 62 MMHG | HEART RATE: 69 BPM | HEIGHT: 63 IN | TEMPERATURE: 96.5 F | WEIGHT: 126.54 LBS

## 2022-08-19 DIAGNOSIS — E11.51 TYPE 2 DIABETES MELLITUS WITH DIABETIC PERIPHERAL ANGIOPATHY WITHOUT GANGRENE, WITH LONG-TERM CURRENT USE OF INSULIN (HCC): ICD-10-CM

## 2022-08-19 DIAGNOSIS — E11.22 TYPE 2 DIABETES MELLITUS WITH STAGE 3B CHRONIC KIDNEY DISEASE, WITH LONG-TERM CURRENT USE OF INSULIN (HCC): ICD-10-CM

## 2022-08-19 DIAGNOSIS — R32 URINARY INCONTINENCE, UNSPECIFIED TYPE: ICD-10-CM

## 2022-08-19 DIAGNOSIS — D49.4 BLADDER TUMOR: Primary | ICD-10-CM

## 2022-08-19 DIAGNOSIS — N18.9 ACUTE KIDNEY INJURY SUPERIMPOSED ON CKD (HCC): ICD-10-CM

## 2022-08-19 DIAGNOSIS — N18.9 CHRONIC KIDNEY DISEASE, UNSPECIFIED CKD STAGE: ICD-10-CM

## 2022-08-19 DIAGNOSIS — Z79.4 TYPE 2 DIABETES MELLITUS WITH STAGE 3B CHRONIC KIDNEY DISEASE, WITH LONG-TERM CURRENT USE OF INSULIN (HCC): ICD-10-CM

## 2022-08-19 DIAGNOSIS — Z95.828 PORT-A-CATH IN PLACE: Primary | ICD-10-CM

## 2022-08-19 DIAGNOSIS — C67.0 MALIGNANT NEOPLASM OF TRIGONE OF URINARY BLADDER (HCC): ICD-10-CM

## 2022-08-19 DIAGNOSIS — N18.32 TYPE 2 DIABETES MELLITUS WITH STAGE 3B CHRONIC KIDNEY DISEASE, WITH LONG-TERM CURRENT USE OF INSULIN (HCC): ICD-10-CM

## 2022-08-19 DIAGNOSIS — D62 ACUTE BLOOD LOSS ANEMIA: ICD-10-CM

## 2022-08-19 DIAGNOSIS — N17.9 ACUTE KIDNEY INJURY SUPERIMPOSED ON CKD (HCC): ICD-10-CM

## 2022-08-19 DIAGNOSIS — Z79.4 TYPE 2 DIABETES MELLITUS WITH DIABETIC PERIPHERAL ANGIOPATHY WITHOUT GANGRENE, WITH LONG-TERM CURRENT USE OF INSULIN (HCC): ICD-10-CM

## 2022-08-19 DIAGNOSIS — R63.4 WEIGHT LOSS: ICD-10-CM

## 2022-08-19 PROBLEM — N39.46 MIXED STRESS AND URGE URINARY INCONTINENCE: Status: ACTIVE | Noted: 2022-08-19

## 2022-08-19 LAB
ALBUMIN SERPL BCP-MCNC: 2.9 G/DL (ref 3.5–5)
ALP SERPL-CCNC: 75 U/L (ref 46–116)
ALT SERPL W P-5'-P-CCNC: 18 U/L (ref 12–78)
ANION GAP SERPL CALCULATED.3IONS-SCNC: 3 MMOL/L (ref 4–13)
AST SERPL W P-5'-P-CCNC: 19 U/L (ref 5–45)
BASOPHILS # BLD AUTO: 0.03 THOUSANDS/ΜL (ref 0–0.1)
BASOPHILS NFR BLD AUTO: 1 % (ref 0–1)
BILIRUB SERPL-MCNC: 0.21 MG/DL (ref 0.2–1)
BUN SERPL-MCNC: 44 MG/DL (ref 5–25)
CALCIUM ALBUM COR SERPL-MCNC: 9.5 MG/DL (ref 8.3–10.1)
CALCIUM SERPL-MCNC: 8.6 MG/DL (ref 8.3–10.1)
CHLORIDE SERPL-SCNC: 110 MMOL/L (ref 96–108)
CO2 SERPL-SCNC: 25 MMOL/L (ref 21–32)
CREAT SERPL-MCNC: 2.75 MG/DL (ref 0.6–1.3)
EOSINOPHIL # BLD AUTO: 0.68 THOUSAND/ΜL (ref 0–0.61)
EOSINOPHIL NFR BLD AUTO: 17 % (ref 0–6)
ERYTHROCYTE [DISTWIDTH] IN BLOOD BY AUTOMATED COUNT: 19 % (ref 11.6–15.1)
EST. AVERAGE GLUCOSE BLD GHB EST-MCNC: 148 MG/DL
GFR SERPL CREATININE-BSD FRML MDRD: 21 ML/MIN/1.73SQ M
GLUCOSE SERPL-MCNC: 190 MG/DL (ref 65–140)
HBA1C MFR BLD: 6.8 %
HCT VFR BLD AUTO: 26.2 % (ref 36.5–49.3)
HGB BLD-MCNC: 8 G/DL (ref 12–17)
IMM GRANULOCYTES # BLD AUTO: 0.01 THOUSAND/UL (ref 0–0.2)
IMM GRANULOCYTES NFR BLD AUTO: 0 % (ref 0–2)
LYMPHOCYTES # BLD AUTO: 0.62 THOUSANDS/ΜL (ref 0.6–4.47)
LYMPHOCYTES NFR BLD AUTO: 15 % (ref 14–44)
MCH RBC QN AUTO: 25 PG (ref 26.8–34.3)
MCHC RBC AUTO-ENTMCNC: 30.5 G/DL (ref 31.4–37.4)
MCV RBC AUTO: 82 FL (ref 82–98)
MONOCYTES # BLD AUTO: 0.48 THOUSAND/ΜL (ref 0.17–1.22)
MONOCYTES NFR BLD AUTO: 12 % (ref 4–12)
NEUTROPHILS # BLD AUTO: 2.23 THOUSANDS/ΜL (ref 1.85–7.62)
NEUTS SEG NFR BLD AUTO: 55 % (ref 43–75)
NRBC BLD AUTO-RTO: 0 /100 WBCS
PLATELET # BLD AUTO: 86 THOUSANDS/UL (ref 149–390)
PMV BLD AUTO: 11.1 FL (ref 8.9–12.7)
POTASSIUM SERPL-SCNC: 4.5 MMOL/L (ref 3.5–5.3)
PROT SERPL-MCNC: 7.3 G/DL (ref 6.4–8.4)
RBC # BLD AUTO: 3.2 MILLION/UL (ref 3.88–5.62)
SODIUM SERPL-SCNC: 138 MMOL/L (ref 135–147)
WBC # BLD AUTO: 4.05 THOUSAND/UL (ref 4.31–10.16)

## 2022-08-19 PROCEDURE — 85025 COMPLETE CBC W/AUTO DIFF WBC: CPT

## 2022-08-19 PROCEDURE — 83036 HEMOGLOBIN GLYCOSYLATED A1C: CPT

## 2022-08-19 PROCEDURE — 3044F HG A1C LEVEL LT 7.0%: CPT | Performed by: INTERNAL MEDICINE

## 2022-08-19 PROCEDURE — 77412 RADIATION TX DELIVERY LVL 3: CPT | Performed by: STUDENT IN AN ORGANIZED HEALTH CARE EDUCATION/TRAINING PROGRAM

## 2022-08-19 PROCEDURE — 77387 GUIDANCE FOR RADJ TX DLVR: CPT | Performed by: STUDENT IN AN ORGANIZED HEALTH CARE EDUCATION/TRAINING PROGRAM

## 2022-08-19 PROCEDURE — 99214 OFFICE O/P EST MOD 30 MIN: CPT | Performed by: FAMILY MEDICINE

## 2022-08-19 PROCEDURE — 80053 COMPREHEN METABOLIC PANEL: CPT

## 2022-08-19 RX ORDER — UNDERPADS 23" X 36"
EACH MISCELLANEOUS 4 TIMES DAILY
Qty: 120 EACH | Refills: 0 | Status: SHIPPED | OUTPATIENT
Start: 2022-08-19 | End: 2022-10-17

## 2022-08-19 NOTE — TELEPHONE ENCOUNTER
Called the patient and left a voicemail for [facility] asking to call the office to schedule a cystoscopy with Dr Cilf Guerra for November 2022  Patient completes radiation around 8/22/2022

## 2022-08-19 NOTE — PROGRESS NOTES
Outpatient Follow-Up - Palliative and Supportive Care   Margarette Palmer 74 y o  male [id number]    Assessment & Plan  Problem List Items Addressed This Visit     Bladder tumor - Primary    Relevant Medications    Incontinence Supply Disposable (Incontinence Brief Medium) MISC    Malignant neoplasm of trigone of urinary bladder (HCC)    Relevant Medications    Nutritional Supplements (Glucerna Shake) LIQD    Type 2 diabetes mellitus with diabetic peripheral angiopathy without gangrene, with long-term current use of insulin (HCC)    Relevant Medications    Nutritional Supplements (Glucerna Shake) LIQD    Weight loss      Other Visit Diagnoses     Urinary incontinence, unspecified type        Relevant Medications    Incontinence Supply Disposable (Incontinence Brief Medium) MISC        Medications adjusted this encounter:  Requested Prescriptions     Signed Prescriptions Disp Refills    Incontinence Supply Disposable (Incontinence Brief Medium) MISC 120 each 0     Sig: Use 4 (four) times a day    Nutritional Supplements (Glucerna Shake) LIQD 17220 mL 0     Sig: Take 1 Bottle by mouth 3 (three) times a day     No orders of the defined types were placed in this encounter  Medications Discontinued During This Encounter   Medication Reason    Nutritional Supplements (Ensure Complete Shake) LIQD     Incontinence Supply Disposable (Incontinence Brief Medium) MISC Reorder    Nutritional Supplements (Glucerna Shake) LIQD Reorder     - no medicine changes  - Will print Rxs for nutrition and continence items, and fax to appropriate pharmacy  - Will defer to Uro re: choice of urinary bags  Margarette Palmer was seen today for symptoms and planning cares related to above illnesses  An attempt to review the PDMP was made but unable to review today  They are invited to continue to follow with us    If there are questions or concerns, please contact us through our clinic/answering service 24 hours a day, seven days a week     Greg Parnell MD  [address] Palliative and Supportive Care  [phone]      Visit Information    Accompanied By: Significant other Bryan Sierra)    Source of History: Self and significant other, Rosibel  History Limitations: None    History of Present Illness      Rainerenzo Yoel is a 74 y o  male who presents in follow up of symptoms related to bladder cancer, UTI, and burning with urination  Pertinent issues include: symptom management, disease process education and discussion of prognosis, advance care planning        He last saw our fellow Dr Yaquelin Leblanc last week, noting:    "Mr Dione Ryan is here for follow-up of his burning with urination  States he has completed the antibiotics but has noted no overall change with the dysuria  States no pain at any other times and it does not affect quality of life  States he did not feel any improvement with pyridium  He did see his PCP yesterday and was told about his symptoms likely related with his radiation (in which I was in agreement and reinforced the possibility of why he still has ongoing symptoms)  I did discuss that since he has his antibiotics and he does not have any signs or symptoms of infection at this time, that this is at least reassuring  I did discuss continuing following Urology for any further recommendations at this time and patient states agreement  We did touch upon Advanced care planning during this visit  Patient and significant other (of 37 years with 1 child from the relationship) state they were working on the paperwork for his living will  Patient states he wants Kike Antunez to be his medical decision maker should he lose competency   He states he has 5 other children from previous relationship and will plan to contact them to inform them that Kike Antunez would be in fact the medical decision maker for the patient  "      Today in office we note that he continues to have urinary discomfort and polyuria, but he does not wish to stop therapies  He wishes for additional support financially, as family have been paying OOP for nutritional supplements and continence briefs  He has noted new incontinence, but no bleeding  As we have noted previously, his urinary issues are not improved with any treatment Abx, nor symptom management we have yet offered  A message was sent to his XRT team to consider dose adjustments for ongoing and worsening symptoms that may indicate significant injury from XRT  Past medical, surgical, social, and family histories are reviewed and pertinent updates are made  Review of Systems   Constitutional: Negative for decreased appetite, weight gain and weight loss  HENT: Negative for hoarse voice and nosebleeds  Eyes: Negative for vision loss in left eye and vision loss in right eye  Cardiovascular: Negative for chest pain and dyspnea on exertion  Respiratory: Negative for cough and shortness of breath  Endocrine: Negative for polydipsia, polyphagia and polyuria  Skin: Negative for flushing and itching  Musculoskeletal: Positive for back pain  Negative for falls  Gastrointestinal: Negative for anorexia, jaundice, nausea and vomiting  Genitourinary: Positive for bladder incontinence, dysuria, flank pain, frequency, nocturia and urgency  Negative for hematuria  Neurological: Negative for dizziness  Psychiatric/Behavioral: Negative for depression and memory loss  The patient is not nervous/anxious  Vital Signs    /62 (BP Location: Left arm, Patient Position: Sitting, Cuff Size: Standard)   Pulse 69   Temp (!) 96 5 °F (35 8 °C) (Temporal)   Ht 5' 3" (1 6 m)   Wt 57 4 kg (126 lb 8 7 oz)   SpO2 99%   BMI 22 42 kg/m²     Physical Exam and Objective Data  Physical Exam  Constitutional:       General: He is not in acute distress  Appearance: He is well-developed  He is not ill-appearing or diaphoretic  HENT:      Head: Normocephalic and atraumatic        Right Ear: External ear normal       Left Ear: External ear normal    Eyes:      General:         Right eye: No discharge  Left eye: No discharge  Conjunctiva/sclera: Conjunctivae normal       Pupils: Pupils are equal, round, and reactive to light  Neck:      Trachea: No tracheal deviation  Cardiovascular:      Rate and Rhythm: Normal rate and regular rhythm  Pulmonary:      Effort: Pulmonary effort is normal  No respiratory distress  Breath sounds: No stridor  Abdominal:      General: There is no distension  Palpations: Abdomen is soft  Musculoskeletal:         General: No deformity  Skin:     Coloration: Skin is not pale  Findings: No erythema or rash  Neurological:      General: No focal deficit present  Mental Status: He is alert and oriented to person, place, and time  Mental status is at baseline  Cranial Nerves: No cranial nerve deficit  Psychiatric:         Mood and Affect: Mood normal          Behavior: Behavior normal          Thought Content: Thought content normal          Judgment: Judgment normal            Radiology and Laboratory:  I personally reviewed and interpreted the following results  30+ minutes was spent face to face with Renetta Xiong and his partner with greater than 50% of the time spent in counseling or coordination of care including: chart review; symptom pursuit; supportive listening; anticipatory guidance; coordination with other providers from the room re: complications of XRT  Niall Candelario All of the patient's or agent's questions were answered during this discussion

## 2022-08-19 NOTE — TELEPHONE ENCOUNTER
----- Message from Roger Goldman MD sent at 8/19/2022 12:25 PM EDT -----  Let them know that kidney function stable    Also please tell them I said HI   ----- Message -----  From: Lab, Background User  Sent: 8/19/2022  11:36 AM EDT  To: Roger Goldman MD

## 2022-08-19 NOTE — TELEPHONE ENCOUNTER
Lm for the patient advising Cr was stable no changes to be made at this time  Message left in [address]

## 2022-08-22 ENCOUNTER — APPOINTMENT (OUTPATIENT)
Dept: RADIATION ONCOLOGY | Facility: HOSPITAL | Age: 72
End: 2022-08-22
Payer: COMMERCIAL

## 2022-08-23 ENCOUNTER — APPOINTMENT (OUTPATIENT)
Dept: RADIATION ONCOLOGY | Facility: HOSPITAL | Age: 72
End: 2022-08-23
Payer: COMMERCIAL

## 2022-08-23 PROCEDURE — 77412 RADIATION TX DELIVERY LVL 3: CPT | Performed by: INTERNAL MEDICINE

## 2022-08-23 PROCEDURE — 77387 GUIDANCE FOR RADJ TX DLVR: CPT | Performed by: INTERNAL MEDICINE

## 2022-08-24 ENCOUNTER — APPOINTMENT (OUTPATIENT)
Dept: RADIATION ONCOLOGY | Facility: HOSPITAL | Age: 72
End: 2022-08-24
Payer: COMMERCIAL

## 2022-08-24 ENCOUNTER — TELEPHONE (OUTPATIENT)
Dept: PALLIATIVE MEDICINE | Facility: CLINIC | Age: 72
End: 2022-08-24

## 2022-08-24 PROCEDURE — 77387 GUIDANCE FOR RADJ TX DLVR: CPT | Performed by: RADIOLOGY

## 2022-08-24 PROCEDURE — 77336 RADIATION PHYSICS CONSULT: CPT | Performed by: RADIOLOGY

## 2022-08-24 PROCEDURE — 77412 RADIATION TX DELIVERY LVL 3: CPT | Performed by: RADIOLOGY

## 2022-08-24 NOTE — TELEPHONE ENCOUNTER
Pt's significant other, Sanford Baumgarten, called the office in hopes of following up on orders to be placed  Orders: Nutritional Supplements and Intontinence supplies  She stated she had been working with [facility], to have orders sent and fulfilled  Please reach out when able to update spouse of order status

## 2022-08-25 ENCOUNTER — APPOINTMENT (OUTPATIENT)
Dept: RADIATION ONCOLOGY | Facility: HOSPITAL | Age: 72
End: 2022-08-25
Attending: RADIOLOGY
Payer: COMMERCIAL

## 2022-08-25 ENCOUNTER — PATIENT OUTREACH (OUTPATIENT)
Dept: HEMATOLOGY ONCOLOGY | Facility: CLINIC | Age: 72
End: 2022-08-25

## 2022-08-25 PROCEDURE — 77412 RADIATION TX DELIVERY LVL 3: CPT | Performed by: INTERNAL MEDICINE

## 2022-08-25 PROCEDURE — 77387 GUIDANCE FOR RADJ TX DLVR: CPT | Performed by: INTERNAL MEDICINE

## 2022-08-25 NOTE — TELEPHONE ENCOUNTER
Verified that [facility] did receive incontinence order  Supplier has been call with no answer    Requested a call to [facility] cell phone    Yony Shepard states that they didn't receive perscription - resent

## 2022-08-25 NOTE — PROGRESS NOTES
MSW made outreach this day to pt's wife, BAKARI JOSEPH to offer support and assess needs  BAKARI IM GESÄUSE was asking about the pt's   and MSW explained the information has been sent in and is awaiting approval from pt's insurance  BAKARI IM GESÄUSE continues to struggle with understanding that the pt has not been approved for full Medicaid as she continues to ask when his card will arrive  MSW will reach out to the Oncology Finance Dept to request they call her to further explain and offer further direction  BAKARI JOSEPH is seeking another insurance for the pt  MSW also explained that briefs will not be covered and a referral to Lindsay Tovar was placed to assist with purchasing this for the pt  BAKARI JOSEPH was appreciative of this  She reports no other needs at this time  She has been doing a great deal for other family members and shared that she feels overwhelmed  MSW encouraged her to practice more self care  Emotional support was offered to her throughout this call, acknowledging the challenges of caregiving  MSW reminded BAKARI JOSEPH of the caregiver support group through the [facility]  MSW will continue to follow

## 2022-08-26 ENCOUNTER — OFFICE VISIT (OUTPATIENT)
Dept: PALLIATIVE MEDICINE | Facility: CLINIC | Age: 72
End: 2022-08-26
Payer: COMMERCIAL

## 2022-08-26 ENCOUNTER — APPOINTMENT (OUTPATIENT)
Dept: RADIATION ONCOLOGY | Facility: HOSPITAL | Age: 72
End: 2022-08-26
Payer: COMMERCIAL

## 2022-08-26 VITALS
SYSTOLIC BLOOD PRESSURE: 104 MMHG | HEART RATE: 81 BPM | TEMPERATURE: 97.3 F | BODY MASS INDEX: 22.14 KG/M2 | WEIGHT: 125 LBS | OXYGEN SATURATION: 99 % | DIASTOLIC BLOOD PRESSURE: 60 MMHG

## 2022-08-26 DIAGNOSIS — C67.0 MALIGNANT NEOPLASM OF TRIGONE OF URINARY BLADDER (HCC): Primary | ICD-10-CM

## 2022-08-26 DIAGNOSIS — G47.09 OTHER INSOMNIA: ICD-10-CM

## 2022-08-26 DIAGNOSIS — R19.7 DIARRHEA, UNSPECIFIED TYPE: ICD-10-CM

## 2022-08-26 DIAGNOSIS — T45.1X5A CHEMOTHERAPY-INDUCED DIARRHEA: ICD-10-CM

## 2022-08-26 DIAGNOSIS — K52.1 CHEMOTHERAPY-INDUCED DIARRHEA: ICD-10-CM

## 2022-08-26 DIAGNOSIS — N34.2 URETHRITIS: ICD-10-CM

## 2022-08-26 PROCEDURE — 1160F RVW MEDS BY RX/DR IN RCRD: CPT | Performed by: INTERNAL MEDICINE

## 2022-08-26 PROCEDURE — 99213 OFFICE O/P EST LOW 20 MIN: CPT | Performed by: INTERNAL MEDICINE

## 2022-08-26 RX ORDER — LOPERAMIDE HYDROCHLORIDE 2 MG/1
2 CAPSULE ORAL 2 TIMES DAILY PRN
Qty: 90 CAPSULE | Refills: 0 | Status: SHIPPED | OUTPATIENT
Start: 2022-08-26

## 2022-08-30 ENCOUNTER — TELEPHONE (OUTPATIENT)
Dept: PALLIATIVE MEDICINE | Facility: CLINIC | Age: 72
End: 2022-08-30

## 2022-08-30 NOTE — TELEPHONE ENCOUNTER
Staff Rachel from patient's medical supply order called office to informed that patient's medicare is not paying for orders that are being request by  and the [facility]  that was giving for the patient is showing as not active  This MA reached out to patient's significant other and informed of the situation, Gualberto Manzanares stated she will call  and patient's health insurance

## 2022-08-31 ENCOUNTER — HOSPITAL ENCOUNTER (OUTPATIENT)
Dept: RADIOLOGY | Facility: HOSPITAL | Age: 72
Discharge: HOME | End: 2022-08-31
Attending: RADIOLOGY | Admitting: RADIOLOGY
Payer: COMMERCIAL

## 2022-08-31 DIAGNOSIS — N13.30 HYDRONEPHROSIS OF RIGHT KIDNEY: ICD-10-CM

## 2022-08-31 PROCEDURE — C1729 CATH, DRAINAGE: HCPCS

## 2022-08-31 PROCEDURE — 50435 EXCHANGE NEPHROSTOMY CATH: CPT

## 2022-08-31 PROCEDURE — 50435 EXCHANGE NEPHROSTOMY CATH: CPT | Performed by: RADIOLOGY

## 2022-08-31 RX ADMIN — IOHEXOL 10 ML: 300 INJECTION, SOLUTION INTRAVENOUS at 08:53

## 2022-08-31 NOTE — DISCHARGE INSTRUCTIONS
TUBE CARE INSTRUCTIONS    Care after your procedure:    Resume your normal diet  Small sips of flat soda will help with nausea  1  The properly functioning catheter should be forward flushed once (1x) daily with 10ml of normal saline using clean technique  You will be given a prescription for flushes  To flush the tube, clean both connections with alcohol swab  Twist off the drainage bag/ bulb  tubing and twist the saline syringe into the drainage tube and flush  Remove the syringe and twist the drainage bag / bulb tubing tubing back on     2  The drainage bag/bulb may be emptied as necessary  Keep a record of the amount of fluid you drain from your tube  This should be done with clean technique as well  3  A fresh dressing should be applied daily over the tube insertion site  4  As the tube is secured to the skin with only a suture,try not to pull on your tube  Tub baths are not permitted  Showers are permitted if the patient's skin entry site is prevented from getting wet  Similarly, washcloth "baths" are acceptable  Contact Interventional Radiology at [phone] [place] PATIENTS: Contact Interventional Radiology at [phone]) Yasmine Bonilla PATIENTS: Contact Interventional Radiology at [phone]) if:    1  Leakage or large amounts of liquid around the catheter  2  Fever of 101 degrees lasting several hours without other obvious cause (such as sore throat, flu, etc)  3  Persistent nausea or vomiting  4  Diminished drainage, which may be associated with pressure or pain  Or when the     drainage from your tube is less than 10mls for 48 hours  5  Catheter pulled back or falls out  The following pharmacies carry the flush syringes         [facility]                     [facility]  [address] [facility]                         [address] [facility]  Phone [phone]            Phone [phone]   [place] [address]                                [phone]  [address] [facility] [address]                      [address]  Phone [phone]            Phone [phone]                      [place]                                                                                                          [phone]  [facility]  [facility] [address]    [address]  Phone [phone]

## 2022-08-31 NOTE — SEDATION DOCUMENTATION
IR right nephrostomy tube exchange by Dr Waters  Patient tolerated procedure well  AVS given to patient and patient d/c to home

## 2022-09-01 ENCOUNTER — TELEPHONE (OUTPATIENT)
Dept: HEMATOLOGY ONCOLOGY | Facility: CLINIC | Age: 72
End: 2022-09-01

## 2022-09-01 NOTE — TELEPHONE ENCOUNTER
Returned call as requested  LMOM to call back for more information on Glucerna order  See media for copy of order that was sent 08/30/22  Entered by Jose Coleman

## 2022-09-01 NOTE — TELEPHONE ENCOUNTER
Received attached VM:  "Hi, this is Gabi calling from [employer]  In regards to the orders that we received for Xena Ribera, date of birth is [id number]  If you will give us a call back here at your earliest convenience, you can reach [phone] ext [phone] or option one from the main menu to 5:00 PM central Standard Time  Thank you and have a great day "    Palliative care please review:  Spoke with Gabi who stated they need further clarification on Glucerna order  This was ordered by Dr Linda Allison  Will forward over to their team for clarification   Gabi can be called back at [phone] ext [phone]

## 2022-09-02 ENCOUNTER — APPOINTMENT (OUTPATIENT)
Dept: RADIATION ONCOLOGY | Facility: HOSPITAL | Age: 72
End: 2022-09-02
Payer: COMMERCIAL

## 2022-09-03 ENCOUNTER — HOSPITAL ENCOUNTER (INPATIENT)
Facility: HOSPITAL | Age: 72
LOS: 1 days | Discharge: HOME | DRG: 690 | End: 2022-09-05
Attending: EMERGENCY MEDICINE | Admitting: INTERNAL MEDICINE
Payer: COMMERCIAL

## 2022-09-03 ENCOUNTER — APPOINTMENT (OUTPATIENT)
Dept: RADIOLOGY | Facility: HOSPITAL | Age: 72
DRG: 690 | End: 2022-09-03
Payer: COMMERCIAL

## 2022-09-03 DIAGNOSIS — E11.40 CONTROLLED TYPE 2 DIABETES MELLITUS WITH DIABETIC NEUROPATHY, WITH LONG-TERM CURRENT USE OF INSULIN (HCC): ICD-10-CM

## 2022-09-03 DIAGNOSIS — R33.9 URINARY RETENTION: Primary | ICD-10-CM

## 2022-09-03 DIAGNOSIS — Z79.4 CONTROLLED TYPE 2 DIABETES MELLITUS WITH DIABETIC NEUROPATHY, WITH LONG-TERM CURRENT USE OF INSULIN (HCC): ICD-10-CM

## 2022-09-03 DIAGNOSIS — R52 INTRACTABLE PAIN: ICD-10-CM

## 2022-09-03 DIAGNOSIS — N30.00 ACUTE CYSTITIS WITHOUT HEMATURIA: ICD-10-CM

## 2022-09-03 LAB
ANION GAP SERPL CALCULATED.3IONS-SCNC: 3 MMOL/L (ref 4–13)
BACTERIA UR QL AUTO: ABNORMAL /HPF
BASOPHILS # BLD AUTO: 0.02 THOUSANDS/ΜL (ref 0–0.1)
BASOPHILS NFR BLD AUTO: 0 % (ref 0–1)
BILIRUB UR QL STRIP: NEGATIVE
BUN SERPL-MCNC: 48 MG/DL (ref 5–25)
CALCIUM SERPL-MCNC: 8.7 MG/DL (ref 8.3–10.1)
CHLORIDE SERPL-SCNC: 110 MMOL/L (ref 96–108)
CLARITY UR: ABNORMAL
CO2 SERPL-SCNC: 24 MMOL/L (ref 21–32)
COLOR UR: ABNORMAL
CREAT SERPL-MCNC: 2.88 MG/DL (ref 0.6–1.3)
EOSINOPHIL # BLD AUTO: 0.15 THOUSAND/ΜL (ref 0–0.61)
EOSINOPHIL NFR BLD AUTO: 2 % (ref 0–6)
ERYTHROCYTE [DISTWIDTH] IN BLOOD BY AUTOMATED COUNT: 17.7 % (ref 11.6–15.1)
GFR SERPL CREATININE-BSD FRML MDRD: 20 ML/MIN/1.73SQ M
GLUCOSE SERPL-MCNC: 124 MG/DL (ref 65–140)
GLUCOSE SERPL-MCNC: 134 MG/DL (ref 65–140)
GLUCOSE SERPL-MCNC: 169 MG/DL (ref 65–140)
GLUCOSE SERPL-MCNC: 179 MG/DL (ref 65–140)
GLUCOSE SERPL-MCNC: 193 MG/DL (ref 65–140)
GLUCOSE UR STRIP-MCNC: NEGATIVE MG/DL
HCT VFR BLD AUTO: 29.9 % (ref 36.5–49.3)
HGB BLD-MCNC: 9.1 G/DL (ref 12–17)
HGB UR QL STRIP.AUTO: ABNORMAL
IMM GRANULOCYTES # BLD AUTO: 0.03 THOUSAND/UL (ref 0–0.2)
IMM GRANULOCYTES NFR BLD AUTO: 0 % (ref 0–2)
KETONES UR STRIP-MCNC: NEGATIVE MG/DL
LEUKOCYTE ESTERASE UR QL STRIP: ABNORMAL
LYMPHOCYTES # BLD AUTO: 0.46 THOUSANDS/ΜL (ref 0.6–4.47)
LYMPHOCYTES NFR BLD AUTO: 6 % (ref 14–44)
MCH RBC QN AUTO: 24.7 PG (ref 26.8–34.3)
MCHC RBC AUTO-ENTMCNC: 30.4 G/DL (ref 31.4–37.4)
MCV RBC AUTO: 81 FL (ref 82–98)
MONOCYTES # BLD AUTO: 0.46 THOUSAND/ΜL (ref 0.17–1.22)
MONOCYTES NFR BLD AUTO: 6 % (ref 4–12)
NEUTROPHILS # BLD AUTO: 7.25 THOUSANDS/ΜL (ref 1.85–7.62)
NEUTS SEG NFR BLD AUTO: 86 % (ref 43–75)
NITRITE UR QL STRIP: NEGATIVE
NON-SQ EPI CELLS URNS QL MICRO: ABNORMAL /HPF
NRBC BLD AUTO-RTO: 0 /100 WBCS
PH UR STRIP.AUTO: 6 [PH]
PLATELET # BLD AUTO: 109 THOUSANDS/UL (ref 149–390)
PMV BLD AUTO: 11.9 FL (ref 8.9–12.7)
POTASSIUM SERPL-SCNC: 4.9 MMOL/L (ref 3.5–5.3)
PROT UR STRIP-MCNC: ABNORMAL MG/DL
RBC # BLD AUTO: 3.68 MILLION/UL (ref 3.88–5.62)
RBC #/AREA URNS AUTO: ABNORMAL /HPF
RENAL EPI CELLS #/AREA URNS HPF: PRESENT /[HPF]
SODIUM SERPL-SCNC: 137 MMOL/L (ref 135–147)
SP GR UR STRIP.AUTO: 1.01 (ref 1–1.03)
UROBILINOGEN UR STRIP-ACNC: <2 MG/DL
WBC # BLD AUTO: 8.37 THOUSAND/UL (ref 4.31–10.16)
WBC #/AREA URNS AUTO: ABNORMAL /HPF
WBC CLUMPS # UR AUTO: PRESENT /UL

## 2022-09-03 PROCEDURE — G1004 CDSM NDSC: HCPCS

## 2022-09-03 PROCEDURE — 96374 THER/PROPH/DIAG INJ IV PUSH: CPT

## 2022-09-03 PROCEDURE — 81001 URINALYSIS AUTO W/SCOPE: CPT | Performed by: INTERNAL MEDICINE

## 2022-09-03 PROCEDURE — 99212 OFFICE O/P EST SF 10 MIN: CPT | Performed by: UROLOGY

## 2022-09-03 PROCEDURE — 99285 EMERGENCY DEPT VISIT HI MDM: CPT

## 2022-09-03 PROCEDURE — 99285 EMERGENCY DEPT VISIT HI MDM: CPT | Performed by: EMERGENCY MEDICINE

## 2022-09-03 PROCEDURE — 87186 SC STD MICRODIL/AGAR DIL: CPT | Performed by: INTERNAL MEDICINE

## 2022-09-03 PROCEDURE — 74176 CT ABD & PELVIS W/O CONTRAST: CPT

## 2022-09-03 PROCEDURE — 99220 PR INITIAL OBSERVATION CARE/DAY 70 MINUTES: CPT | Performed by: INTERNAL MEDICINE

## 2022-09-03 PROCEDURE — 80048 BASIC METABOLIC PNL TOTAL CA: CPT

## 2022-09-03 PROCEDURE — 87077 CULTURE AEROBIC IDENTIFY: CPT | Performed by: INTERNAL MEDICINE

## 2022-09-03 PROCEDURE — 82948 REAGENT STRIP/BLOOD GLUCOSE: CPT

## 2022-09-03 PROCEDURE — 87086 URINE CULTURE/COLONY COUNT: CPT | Performed by: INTERNAL MEDICINE

## 2022-09-03 PROCEDURE — 36415 COLL VENOUS BLD VENIPUNCTURE: CPT

## 2022-09-03 PROCEDURE — 85025 COMPLETE CBC W/AUTO DIFF WBC: CPT

## 2022-09-03 RX ORDER — TEMAZEPAM 15 MG/1
30 CAPSULE ORAL
Status: DISCONTINUED | OUTPATIENT
Start: 2022-09-03 | End: 2022-09-05 | Stop reason: HOSPADM

## 2022-09-03 RX ORDER — SODIUM CHLORIDE 9 MG/ML
10 INJECTION INTRAVENOUS DAILY
Status: DISCONTINUED | OUTPATIENT
Start: 2022-09-03 | End: 2022-09-05 | Stop reason: HOSPADM

## 2022-09-03 RX ORDER — ONDANSETRON 2 MG/ML
4 INJECTION INTRAMUSCULAR; INTRAVENOUS EVERY 6 HOURS PRN
Status: DISCONTINUED | OUTPATIENT
Start: 2022-09-03 | End: 2022-09-05 | Stop reason: HOSPADM

## 2022-09-03 RX ORDER — CLOPIDOGREL BISULFATE 75 MG/1
75 TABLET ORAL DAILY
Status: DISCONTINUED | OUTPATIENT
Start: 2022-09-04 | End: 2022-09-05 | Stop reason: HOSPADM

## 2022-09-03 RX ORDER — OXYCODONE HYDROCHLORIDE 5 MG/1
2.5 TABLET ORAL EVERY 6 HOURS PRN
Status: DISCONTINUED | OUTPATIENT
Start: 2022-09-03 | End: 2022-09-05 | Stop reason: HOSPADM

## 2022-09-03 RX ORDER — ACETAMINOPHEN 325 MG/1
650 TABLET ORAL ONCE
Status: COMPLETED | OUTPATIENT
Start: 2022-09-03 | End: 2022-09-03

## 2022-09-03 RX ORDER — ACETAMINOPHEN 325 MG/1
500 TABLET ORAL EVERY 6 HOURS PRN
Status: DISCONTINUED | OUTPATIENT
Start: 2022-09-03 | End: 2022-09-05 | Stop reason: HOSPADM

## 2022-09-03 RX ORDER — TAMSULOSIN HYDROCHLORIDE 0.4 MG/1
0.4 CAPSULE ORAL
Status: DISCONTINUED | OUTPATIENT
Start: 2022-09-03 | End: 2022-09-05 | Stop reason: HOSPADM

## 2022-09-03 RX ORDER — HYDROMORPHONE HCL/PF 1 MG/ML
0.5 SYRINGE (ML) INJECTION ONCE
Status: COMPLETED | OUTPATIENT
Start: 2022-09-03 | End: 2022-09-03

## 2022-09-03 RX ORDER — HEPARIN SODIUM 5000 [USP'U]/ML
5000 INJECTION, SOLUTION INTRAVENOUS; SUBCUTANEOUS EVERY 8 HOURS SCHEDULED
Status: DISCONTINUED | OUTPATIENT
Start: 2022-09-04 | End: 2022-09-05 | Stop reason: HOSPADM

## 2022-09-03 RX ORDER — OXYCODONE HYDROCHLORIDE 5 MG/1
5 TABLET ORAL EVERY 6 HOURS PRN
Status: DISCONTINUED | OUTPATIENT
Start: 2022-09-03 | End: 2022-09-05 | Stop reason: HOSPADM

## 2022-09-03 RX ORDER — ZOLPIDEM TARTRATE 5 MG/1
10 TABLET ORAL
Status: DISCONTINUED | OUTPATIENT
Start: 2022-09-03 | End: 2022-09-05 | Stop reason: HOSPADM

## 2022-09-03 RX ORDER — AMOXICILLIN 250 MG
1 CAPSULE ORAL 2 TIMES DAILY
Status: DISCONTINUED | OUTPATIENT
Start: 2022-09-03 | End: 2022-09-05 | Stop reason: HOSPADM

## 2022-09-03 RX ORDER — TACROLIMUS 1 MG/1
1 CAPSULE ORAL EVERY 12 HOURS
Status: DISCONTINUED | OUTPATIENT
Start: 2022-09-03 | End: 2022-09-05 | Stop reason: HOSPADM

## 2022-09-03 RX ORDER — PREGABALIN 50 MG/1
50 CAPSULE ORAL 3 TIMES DAILY
Status: DISCONTINUED | OUTPATIENT
Start: 2022-09-03 | End: 2022-09-05 | Stop reason: HOSPADM

## 2022-09-03 RX ORDER — INSULIN LISPRO 100 [IU]/ML
1-5 INJECTION, SOLUTION INTRAVENOUS; SUBCUTANEOUS
Status: DISCONTINUED | OUTPATIENT
Start: 2022-09-03 | End: 2022-09-05 | Stop reason: HOSPADM

## 2022-09-03 RX ORDER — HYDROMORPHONE HCL IN WATER/PF 6 MG/30 ML
0.2 PATIENT CONTROLLED ANALGESIA SYRINGE INTRAVENOUS EVERY 4 HOURS PRN
Status: DISCONTINUED | OUTPATIENT
Start: 2022-09-03 | End: 2022-09-05 | Stop reason: HOSPADM

## 2022-09-03 RX ORDER — POLYETHYLENE GLYCOL 3350 17 G/17G
17 POWDER, FOR SOLUTION ORAL DAILY PRN
Status: DISCONTINUED | OUTPATIENT
Start: 2022-09-03 | End: 2022-09-05 | Stop reason: HOSPADM

## 2022-09-03 RX ADMIN — OXYCODONE HYDROCHLORIDE 5 MG: 5 TABLET ORAL at 22:29

## 2022-09-03 RX ADMIN — PREGABALIN 50 MG: 50 CAPSULE ORAL at 12:01

## 2022-09-03 RX ADMIN — HYDROMORPHONE HYDROCHLORIDE 0.2 MG: 0.2 INJECTION, SOLUTION INTRAMUSCULAR; INTRAVENOUS; SUBCUTANEOUS at 18:12

## 2022-09-03 RX ADMIN — TACROLIMUS 1 MG: 1 CAPSULE ORAL at 22:32

## 2022-09-03 RX ADMIN — SODIUM CHLORIDE 10 ML: 9 INJECTION, SOLUTION INTRAMUSCULAR; INTRAVENOUS; SUBCUTANEOUS at 12:18

## 2022-09-03 RX ADMIN — TAMSULOSIN HYDROCHLORIDE 0.4 MG: 0.4 CAPSULE ORAL at 18:12

## 2022-09-03 RX ADMIN — HYDROMORPHONE HYDROCHLORIDE 0.5 MG: 1 INJECTION, SOLUTION INTRAMUSCULAR; INTRAVENOUS; SUBCUTANEOUS at 08:42

## 2022-09-03 RX ADMIN — HYDROMORPHONE HYDROCHLORIDE 0.2 MG: 0.2 INJECTION, SOLUTION INTRAMUSCULAR; INTRAVENOUS; SUBCUTANEOUS at 12:01

## 2022-09-03 RX ADMIN — PREGABALIN 50 MG: 50 CAPSULE ORAL at 22:30

## 2022-09-03 RX ADMIN — OXYCODONE HYDROCHLORIDE 5 MG: 5 TABLET ORAL at 15:25

## 2022-09-03 RX ADMIN — INSULIN DETEMIR 10 UNITS: 100 INJECTION, SOLUTION SUBCUTANEOUS at 22:30

## 2022-09-03 RX ADMIN — SENNOSIDES, DOCUSATE SODIUM 1 TABLET: 8.6; 5 TABLET ORAL at 18:12

## 2022-09-03 RX ADMIN — ACETAMINOPHEN 650 MG: 325 TABLET ORAL at 08:46

## 2022-09-03 RX ADMIN — ZOLPIDEM TARTRATE 10 MG: 5 TABLET ORAL at 22:57

## 2022-09-03 NOTE — ASSESSMENT & PLAN NOTE
Lab Results   Component Value Date    HGBA1C 6 8 (H) 08/19/2022       No results for input(s): POCGLU in the last 72 hours      Blood Sugar Average: Last 72 hrs:  ·  home regimen: on Levemir 18 units at bedtime and sliding scale  · Per wife with 18 units of Levemir his blood sugar drops in the morning occasionally  · While in hospital start Levemir 10 units at bedtime and sliding scale  · Diabetic diet

## 2022-09-03 NOTE — ED PROVIDER NOTES
History  Chief Complaint   Patient presents with    Urinary Retention     Patient presents with urinary retention since last night  States he is only getting drops of urine out and noticing blood in urine  C/O serve pain in penis  Hx bladder cancer     Pt is a 66yo M who presents for abdominal pain and urinary retention  Pt has history of nephrostomy tube and states he usually has good urine output from that as well as with urination  Pt states that last night he was unable to urinate and it was worsened today  He states he usually urinates a good amount but since last night he has only been able to get drops out  Pt states increasing suprapubic discomfort and is now extremely uncomfortable which is why he came in for evaluation  Pt denies any fevers or any other symptoms and states he is still getting urine out of his nephrostomy  Prior to Admission Medications   Prescriptions Last Dose Informant Patient Reported? Taking?    Blood Glucose Calibration (OT ULTRA/FASTTK CNTRL SOLN) SOLN   No No   Sig: USE AS DIRECTED   Comfort EZ Pen Needles 32G X 4 MM MISC  Spouse/Significant Other No No   Sig: USE 3-4 AS DIRECTED   Continuous Blood Gluc  (FreeStyle Frank 14 Day [place]) NATALIA   No No   Sig: Use 1 Device continuous   Continuous Blood Gluc Sensor (FreeStyle Frank 14 Day Sensor) [facility]   No No   Sig: Use 1 Device 4 (four) times a day   INSULIN SYRINGE 1CC/29G 29G X 1/2" 1 ML MISC  Spouse/Significant Other Yes No   Sig: by Does not apply route   Incontinence Supply Disposable (Incontinence Brief Medium) MISC   No No   Sig: Use 4 (four) times a day   Lancet Devices (Lancing Device) MISC   No No   Sig: USE AS DIRECTED   Lancets MISC  Spouse/Significant Other Yes No   Sig: by Does not apply route   Nutritional Supplements (Glucerna Shake) LIQD   No No   Sig: Take 1 Bottle by mouth 3 (three) times a day   OneTouch Ultra test strip  Spouse/Significant Other No No   Sig: TEST UP TO 3 TIMES A DAY   clopidogrel (PLAVIX) 75 mg tablet  Spouse/Significant Other No No   Sig: Take 1 tablet (75 mg total) by mouth daily   insulin detemir (Levemir FlexTouch) 100 Units/mL injection pen  Spouse/Significant Other No No   Sig: Inject 18 Units under the skin daily at bedtime   loperamide (IMODIUM) 2 mg capsule   No No   Sig: Take 1 capsule (2 mg total) by mouth 2 (two) times a day as needed for diarrhea [place] [facility] dos veces por cynthia si tiene diarrea   pregabalin (LYRICA) 50 mg capsule  Spouse/Significant Other No No   Sig: TAKE 1 CAPSULE (50 MG TOTAL) BY MOUTH 3 (THREE) TIMES A DAY   rosuvastatin (CRESTOR) 40 MG tablet   No No   Sig: TAKE ONE (1) TABLET BY MOUTH DAILY   sodium chloride, PF, 0 9 %   No No   Sig: 10 mL by Intracatheter route daily Intracatheter flushing daily   May substitute prefilled syringe with normal saline 10 mL vials, 10 mL syringes, and 18 g blunt needles   tacrolimus (PROGRAF) 1 mg capsule  Spouse/Significant Other No No   Sig: Take 1 capsule (1 mg total) by mouth every 12 (twelve) hours   tamsulosin (FLOMAX) 0 4 mg   No No   Sig: Take 1 capsule (0 4 mg total) by mouth daily with dinner   temazepam (RESTORIL) 30 mg capsule   No No   Sig: TAKE 1 CAPSULE (30 MG TOTAL) BY MOUTH DAILY AT BEDTIME   zolpidem (AMBIEN) 10 mg tablet   No No   Sig: TAKE 1 TABLET (10 MG TOTAL) BY MOUTH DAILY AT BEDTIME      Facility-Administered Medications Last Administration Doses Remaining   lidocaine (URO-JET, GLYDO) 2 % urethral/mucosal gel 1 application None recorded 15          Past Medical History:   Diagnosis Date    Alcoholism ([facility] )     Cerebral artery aneurysm     Change in mental state     last assessed 5/18/15; resolved 10/27/15    Diabetes mellitus ([facility] )     Drug dependence ([facility] )     Fatigue     last assessed 1/26/15; resolved 5/24/16    Hepatitis C    [facility] follow[facility]up 12/21/2018    Kidney disease     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[facility] )     [facility] recipient [facility])     Renal disorder  Subdural hygroma     2/27/14; resolved 7/28/15    Thrombocytopenia ([address] ) 9/20/2017       Past Surgical History:   Procedure Laterality Date    BRAIN SURGERY  02/12/2014    left frontotemporal 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    CATARACT EXTRACTION      CHOLECYSTECTOMY      FL LUMBAR PUNCTURE DIAGNOSTIC  12/14/2018    IR NEPHROSTOMY TUBE CHECK/CHANGE/REPOSITION/REINSERTION/UPSIZE  7/29/2022    IR NEPHROSTOMY TUBE CHECK/CHANGE/REPOSITION/REINSERTION/UPSIZE  8/31/2022    IR NEPHROSTOMY TUBE PLACEMENT  5/28/2022    IR PICC LINE  12/14/2018    IR PORT PLACEMENT  6/23/2022    LIVER TRANSPLANTATION      ROTATOR CUFF REPAIR      SHOULDER SURGERY      TRANSURETHRAL RESECTION OF BLADDER TUMOR N/A 5/26/2022    Procedure: TRANSURETHRAL RESECTION OF BLADDER TUMOR (TURBT); Surgeon: Rashi Young MD;  Location: [facility];  Service: Urology       Family History   Problem Relation Age of Onset    Hypertension Mother         benign essential     Lung cancer Father     Diabetes Sister     Cancer Brother     Stroke Other         cva - due to embolism of cerebra artery      I have reviewed and agree with the history as documented  E-Cigarette/Vaping    E-Cigarette Use Never User      E-Cigarette/Vaping Substances    Nicotine No     THC No     CBD No     Flavoring No     Other No     Unknown No      Social History     Tobacco Use    Smoking status: Never Smoker    Smokeless tobacco: Never Used    Tobacco comment: former smoker per allscripts    Vaping Use    Vaping Use: Never used   Substance Use Topics    Alcohol use: Not Currently    Drug use: No     Comment: remotely quit drug use per allscripts         Review of Systems   Gastrointestinal: Positive for abdominal pain  Genitourinary: Positive for difficulty urinating  All other systems reviewed and are negative        Physical Exam  ED Triage Vitals [09/03/22 0602]   Temperature Pulse Respirations Blood Pressure SpO2   (!) 97 3 °F (36 3 °C) (!) 109 18 (!) 225/118 100 %      Temp Source Heart Rate Source Patient Position - Orthostatic VS BP Location FiO2 (%)   Oral Monitor Lying Right arm --      Pain Score       10 - Worst Possible Pain             Orthostatic Vital Signs  Vitals:    09/03/22 2300 09/04/22 0816 09/04/22 2249 09/05/22 0700   BP: 112/59 110/61 141/78 129/71   Pulse: 86 98 88 86   Patient Position - Orthostatic VS: Lying Lying Lying Lying       Physical Exam  Vitals reviewed  Constitutional:       General: He is in acute distress (2/2 pain)  Appearance: He is well-developed  He is not diaphoretic  HENT:      Head: Normocephalic and atraumatic  Right Ear: External ear normal       Left Ear: External ear normal       Nose: Nose normal       Mouth/Throat:      Mouth: Mucous membranes are moist       Pharynx: Oropharynx is clear  Eyes:      Extraocular Movements: Extraocular movements intact  Conjunctiva/sclera: Conjunctivae normal       Pupils: Pupils are equal, round, and reactive to light  Cardiovascular:      Rate and Rhythm: Normal rate and regular rhythm  Heart sounds: Normal heart sounds  No murmur heard  Pulmonary:      Effort: Pulmonary effort is normal  No respiratory distress  Breath sounds: Normal breath sounds  No stridor  No wheezing  Abdominal:      General: There is no distension  Palpations: Abdomen is soft  There is no mass  Tenderness: There is abdominal tenderness (diffuse but worst in the suprapubic region)  There is no right CVA tenderness or left CVA tenderness  Comments: R sided nephrostomy in place without evidence of infection at the site and no hematuria noted in the collection bag   Musculoskeletal:         General: No tenderness or deformity  Normal range of motion  Cervical back: Normal range of motion and neck supple  Skin:     General: Skin is warm and dry        Capillary Refill: Capillary refill takes less than 2 seconds  Coloration: Skin is not pale  Findings: No erythema or rash  Neurological:      General: No focal deficit present  Mental Status: He is alert and oriented to person, place, and time  Psychiatric:         Speech: Speech normal          Behavior: Behavior is cooperative           ED Medications  Medications   acetaminophen (TYLENOL) tablet 650 mg (650 mg Oral Given 9/3/22 0846)   HYDROmorphone (DILAUDID) injection 0 5 mg (0 5 mg Intravenous Given 9/3/22 0842)       Diagnostic Studies  Results Reviewed     Procedure Component Value Units Date/Time    Basic metabolic panel [[id number]]  (Abnormal) Collected: 09/04/22 0559    Lab Status: Final result Specimen: Blood from Central Venous Line Updated: 09/04/22 0641     Sodium 136 mmol/L      Potassium 4 8 mmol/L      Chloride 108 mmol/L      CO2 22 mmol/L      ANION GAP 6 mmol/L      BUN 49 mg/dL      Creatinine 2 95 mg/dL      Glucose 140 mg/dL      Glucose, Fasting 140 mg/dL      Calcium 8 0 mg/dL      eGFR 19 ml/min/1 73sq m     Narrative:      [place] guidelines for Chronic Kidney Disease (CKD):     Stage 1 with normal or high GFR (GFR > 90 mL/min/1 73 square meters)    Stage 2 Mild CKD (GFR = 60-89 mL/min/1 73 square meters)    Stage 3A Moderate CKD (GFR = 45-59 mL/min/1 73 square meters)    Stage 3B Moderate CKD (GFR = 30-44 mL/min/1 73 square meters)    Stage 4 Severe CKD (GFR = 15-29 mL/min/1 73 square meters)    Stage 5 End Stage CKD (GFR <15 mL/min/1 73 square meters)  Note: GFR calculation is accurate only with a steady state creatinine    Magnesium [[id number]]  (Abnormal) Collected: 09/04/22 0559    Lab Status: Final result Specimen: Blood from Central Venous Line Updated: 09/04/22 0641     Magnesium 1 5 mg/dL     CBC and differential [[id number]]  (Abnormal) Collected: 09/04/22 0559    Lab Status: Final result Specimen: Blood from Central Venous Line Updated: 09/04/22 0633     WBC 6 36 Thousand/uL      RBC 3 12 Million/uL      Hemoglobin 7 6 g/dL      Hematocrit 25 4 %      MCV 81 fL      MCH 24 4 pg      MCHC 29 9 g/dL      RDW 17 6 %      MPV 12 1 fL      Platelets 75 Thousands/uL      nRBC 0 /100 WBCs      Neutrophils Relative 78 %      Immat GRANS % 1 %      Lymphocytes Relative 7 %      Monocytes Relative 12 %      Eosinophils Relative 2 %      Basophils Relative 0 %      Neutrophils Absolute 4 99 Thousands/µL      Immature Grans Absolute 0 03 Thousand/uL      Lymphocytes Absolute 0 43 Thousands/µL      Monocytes Absolute 0 76 Thousand/µL      Eosinophils Absolute 0 13 Thousand/µL      Basophils Absolute 0 02 Thousands/µL     Fingerstick Glucose (POCT) [[id number]]  (Normal) Collected: 09/03/22 1528    Lab Status: Final result Updated: 09/03/22 1536     POC Glucose 124 mg/dl     Fingerstick Glucose (POCT) [[id number]]  (Normal) Collected: 09/03/22 1215    Lab Status: Final result Updated: 09/03/22 1217     POC Glucose 134 mg/dl     Basic metabolic panel [[id number]]  (Abnormal) Collected: 09/03/22 0846    Lab Status: Final result Specimen: Blood from Central Venous Line Updated: 09/03/22 0914     Sodium 137 mmol/L      Potassium 4 9 mmol/L      Chloride 110 mmol/L      CO2 24 mmol/L      ANION GAP 3 mmol/L      BUN 48 mg/dL      Creatinine 2 88 mg/dL      Glucose 179 mg/dL      Calcium 8 7 mg/dL      eGFR 20 ml/min/1 73sq m     Narrative:      [place] guidelines for Chronic Kidney Disease (CKD):     Stage 1 with normal or high GFR (GFR > 90 mL/min/1 73 square meters)    Stage 2 Mild CKD (GFR = 60-89 mL/min/1 73 square meters)    Stage 3A Moderate CKD (GFR = 45-59 mL/min/1 73 square meters)    Stage 3B Moderate CKD (GFR = 30-44 mL/min/1 73 square meters)    Stage 4 Severe CKD (GFR = 15-29 mL/min/1 73 square meters)    Stage 5 End Stage CKD (GFR <15 mL/min/1 73 square meters)  Note: GFR calculation is accurate only with a steady state creatinine    CBC and differential [[id number]]  (Abnormal) Collected: 09/03/22 0846    Lab Status: Final result Specimen: Blood from Central Venous Line Updated: 09/03/22 0853     WBC 8 37 Thousand/uL      RBC 3 68 Million/uL      Hemoglobin 9 1 g/dL      Hematocrit 29 9 %      MCV 81 fL      MCH 24 7 pg      MCHC 30 4 g/dL      RDW 17 7 %      MPV 11 9 fL      Platelets 403 Thousands/uL      nRBC 0 /100 WBCs      Neutrophils Relative 86 %      Immat GRANS % 0 %      Lymphocytes Relative 6 %      Monocytes Relative 6 %      Eosinophils Relative 2 %      Basophils Relative 0 %      Neutrophils Absolute 7 25 Thousands/µL      Immature Grans Absolute 0 03 Thousand/uL      Lymphocytes Absolute 0 46 Thousands/µL      Monocytes Absolute 0 46 Thousand/µL      Eosinophils Absolute 0 15 Thousand/µL      Basophils Absolute 0 02 Thousands/µL                  US kidney and bladder with pvr   Final Result by Joanne Poe DO (09/05 0449)      No hydronephrosis  No significant post void residual volume  Workstation performed: [id number]         CT abdomen pelvis wo contrast   Final Result by Lisa Figueredo MD (09/03 1338)      Unchanged appearance of right percutaneous nephrostomy catheter within the collecting system with atrophic right kidney  A bubble of gas within the right renal collecting system is reidentified  Bladder distention, bladder wall thickening, and perivesical inflammatory stranding suggests cystitis  Asymmetric thickening in the posterior right urinary bladder is similar when compared to June 9, 2022 and on this noncontrast examination, plaque like    bladder neoplasm cannot be excluded with confidence  Mild fullness of left renal collecting system and ureter probably related to bladder distention but without left-sided urinary tract calculi evident           Workstation performed: [id number]               Procedures  Procedures      ED Course  ED Course as of 09/14/22 0950   Sat Sep 03, 2022   8289 Bladder scan 168  Flores attempted to be placed, however, unable to pass  Pt voided spontaneously and had improvement of symptoms  Repeat bladder scan now showing >200  Will plan for urology consult     5749 Urology made aware  Identification of Seniors at [facility] Most Recent Value   (ISAR) Identification of Seniors at Risk    Before the illness or injury that brought you to the Emergency, did you need someone to help you on a regular basis? 0 Filed at: 09/03/2022 0605   In the last 24 hours, have you needed more help than usual? 0 Filed at: 09/03/2022 3466   Have you been hospitalized for one or more nights during the past 6 months? 1 Filed at: 09/03/2022 6862   In general, do you see well? 0 Filed at: 09/03/2022 7356   In general, do you have serious problems with your memory? 1 Filed at: 09/03/2022 0605   Do you take more than three different medications every day? 1 Filed at: 09/03/2022 5236   ISAR Score 3 Filed at: 09/03/2022 0605                    SBIRT 22yo+    Flowsheet Row Most Recent Value   SBIRT (25 yo +)    In order to provide better care to our patients, we are screening all of our patients for alcohol and drug use  Would it be okay to ask you these screening questions? No Filed at: 09/03/2022 0606                MDM  Number of Diagnoses or Management Options  Intractable pain  Urinary retention  Diagnosis management comments: Pt is a 64yo M who presents with urinary retention  Exam pertinent for uncomfortable appearing male with abdominal tenderness  Bladder scan obtained that showed minimal urine, however, as pt presenting like a urinary retention, decision made to still attempt flores placement  Nursing unable to place flores, but during attempt, pt able to void a small amount and immediately had improvement in his symptoms  Pt now appearing more comfortable  Bladder scan repeated and now showing >200  Despite pt voiding on his own, PVR >200 still showing urinary retention  Urology consult placed and on-call provider contacted via TT  Still awaiting urology consult at time of sign out  Pt signed out to oncoming physician with plan for dispo per urology and pt reassessment  Disposition  Final diagnoses:   Urinary retention   Intractable pain     Time reflects when diagnosis was documented in both MDM as applicable and the Disposition within this note     Time User Action Codes Description Comment    9/3/2022  7:27 AM Wonda Cea Add [R33 9] Urinary retention     9/3/2022  9:25 AM Dorette Heading Add [R52] Intractable pain     9/5/2022  2:10 PM Alanis Fredy Add [E11 40,  Z79 4] Controlled type 2 diabetes mellitus with diabetic neuropathy, with long-term current use of insulin ([facility] )     9/5/2022  2:14 PM Alanis Ericksonre Add [N30 00] Acute cystitis without hematuria       ED Disposition     ED Disposition   Admit    Condition   Stable    Date/Time   Sat Sep 3, 2022  9:25 AM    Comment   Case was discussed with NILTON and the patient's admission status was agreed to be Admission Status: observation status to the service of Dr Rosie Henley              Follow-up Information     Follow up With Specialties Details Why Contact Info Additional [address] [facility],  Family Medicine Call  As needed [address] [address]  [phone]       [facility] Call   [address] [place] [id number] [phone] [facility], [address] [place] [address], [address]          Discharge Medication List as of 9/5/2022  3:48 PM      START taking these medications    Details   cephalexin (KEFLEX) 500 mg capsule Take 1 capsule (500 mg total) by mouth every 12 (twelve) hours for 4 days, Starting Tue 9/6/2022, Until Sat 9/10/2022, Normal         CONTINUE these medications which have CHANGED    Details   insulin detemir (Levemir FlexTouch) 100 Units/mL injection pen Inject 15 Units under the skin daily at bedtime, Starting Mon 9/5/2022, No Print         CONTINUE these medications which have NOT CHANGED    Details   Blood Glucose Calibration (OT ULTRA/FASTTK CNTRL SOLN) SOLN USE AS DIRECTED, Normal      clopidogrel (PLAVIX) 75 mg tablet Take 1 tablet (75 mg total) by mouth daily, Starting Mon 8/30/2021, Normal      Comfort EZ Pen Needles 32G X 4 MM MISC USE 3-4 AS DIRECTED, Normal      Continuous Blood Gluc  (FreeStyle Frank 14 Day [place]) NATALIA Use 1 Device continuous, Starting Fri 6/3/2022, Normal      Continuous Blood Gluc Sensor (FreeStyle Frank 14 Day Sensor) MISC Use 1 Device 4 (four) times a day, Starting Fri 6/3/2022, Normal      Incontinence Supply Disposable (Incontinence Brief Medium) MISC Use 4 (four) times a day, Starting Fri 8/19/2022, Until Sun 9/18/2022, Print      INSULIN SYRINGE 1CC/29G 29G X 1/2" 1 ML MISC by Does not apply route, Starting Wed 1/11/2012, Historical Med      Lancet Devices (Lancing Device) MISC USE AS DIRECTED, Normal      Lancets MISC by Does not apply route, Starting Fri 5/12/2017, Historical Med      loperamide (IMODIUM) 2 mg capsule Take 1 capsule (2 mg total) by mouth 2 (two) times a day as needed for diarrhea [facility] por cynthia si tiene diarrea, Starting Fri 8/26/2022, Normal      Nutritional Supplements (Glucerna Shake) LIQD Take 1 Bottle by mouth 3 (three) times a day, Starting Fri 8/19/2022, Print      OneTouch Ultra test strip TEST UP TO 3 TIMES A DAY, Normal      pregabalin (LYRICA) 50 mg capsule TAKE 1 CAPSULE (50 MG TOTAL) BY MOUTH 3 (THREE) TIMES A DAY, Starting Mon 5/23/2022, Normal      rosuvastatin (CRESTOR) 40 MG tablet TAKE ONE (1) TABLET BY MOUTH DAILY, Normal      sodium chloride, PF, 0 9 % 10 mL by Intracatheter route daily Intracatheter flushing daily   May substitute prefilled syringe with normal saline 10 mL vials, 10 mL syringes, and 18 g blunt needles, Starting Wed 7/20/2022, Until Tue 10/18/2022, Normal      tacrolimus (PROGRAF) 1 mg capsule Take 1 capsule (1 mg total) by mouth every 12 (twelve) hours, Starting Mon 8/30/2021, Normal      tamsulosin (FLOMAX) 0 4 mg Take 1 capsule (0 4 mg total) by mouth daily with dinner, Starting Thu 8/4/2022, Until Wed 11/2/2022, Phone In      temazepam (RESTORIL) 30 mg capsule TAKE 1 CAPSULE (30 MG TOTAL) BY MOUTH DAILY AT BEDTIME, Starting Wed 7/27/2022, Normal      zolpidem (AMBIEN) 10 mg tablet TAKE 1 TABLET (10 MG TOTAL) BY MOUTH DAILY AT BEDTIME, Starting Fri 8/5/2022, Normal      phenazopyridine (PYRIDIUM) 100 mg tablet Take 1 tablet (100 mg total) by mouth 3 (three) times a week [place] en viernes, en lunes, en miercoles, y despues quitalo, Starting Fri 7/22/2022, Normal      triamcinolone (KENALOG) 0 1 % cream Apply topically 2 (two) times a day Apply to affected area, Starting Thu 8/4/2022, Historical Med         STOP taking these medications       acetaminophen (TYLENOL) 500 mg tablet Comments:   Reason for Stopping:             Outpatient Discharge Orders   Discharge Diet     Activity as tolerated       PDMP Review       Value Time User    PDMP Reviewed  Yes 8/26/2022  9:16 AM Lata Holt MD           ED Provider  Attending physically available and evaluated Madelanie Perez  OLESYA managed the patient along with the ED Attending      Electronically Signed by         Christy Payne MD  09/14/22 8028

## 2022-09-03 NOTE — PLAN OF CARE
Problem: Potential for Falls  Goal: Patient will remain free of falls  Description: INTERVENTIONS:  - Educate patient/family on patient safety including physical limitations  - Instruct patient to call for assistance with activity   - Consult OT/PT to assist with strengthening/mobility   - Keep Call bell within reach  - Keep bed low and locked with side rails adjusted as appropriate  - Keep care items and personal belongings within reach  - Initiate and maintain comfort rounds  - Make Fall Risk Sign visible to staff  - Apply yellow socks and bracelet for high fall risk patients  - Consider moving patient to room near nurses station  Outcome: Progressing     Problem: PAIN - ADULT  Goal: Verbalizes/displays adequate comfort level or baseline comfort level  Description: Interventions:  - Encourage patient to monitor pain and request assistance  - Assess pain using appropriate pain scale  - Administer analgesics based on type and severity of pain and evaluate response  - Implement non-pharmacological measures as appropriate and evaluate response  - Consider cultural and social influences on pain and pain management  - Notify physician/advanced practitioner if interventions unsuccessful or patient reports new pain  Outcome: Progressing     Problem: INFECTION - ADULT  Goal: Absence or prevention of progression during hospitalization  Description: INTERVENTIONS:  - Assess and monitor for signs and symptoms of infection  - Monitor lab/diagnostic results  - Monitor all insertion sites, i e  indwelling lines, tubes, and drains  - Monitor endotracheal if appropriate and nasal secretions for changes in amount and color  - Baxter appropriate cooling/warming therapies per order  - Administer medications as ordered  - Instruct and encourage patient and family to use good hand hygiene technique  - Identify and instruct in appropriate isolation precautions for identified infection/condition  Outcome: Progressing  Goal: Absence of fever/infection during neutropenic period  Description: INTERVENTIONS:  - Monitor WBC    Outcome: Progressing     Problem: SAFETY ADULT  Goal: Patient will remain free of falls  Description: INTERVENTIONS:  - Educate patient/family on patient safety including physical limitations  - Instruct patient to call for assistance with activity   - Consult OT/PT to assist with strengthening/mobility   - Keep Call bell within reach  - Keep bed low and locked with side rails adjusted as appropriate  - Keep care items and personal belongings within reach  - Initiate and maintain comfort rounds  - Make Fall Risk Sign visible to staff  - Apply yellow socks and bracelet for high fall risk patients  - Consider moving patient to room near nurses station  Outcome: Progressing  Goal: Maintain or return to baseline ADL function  Description: INTERVENTIONS:  -  Assess patient's ability to carry out ADLs; assess patient's baseline for ADL function and identify physical deficits which impact ability to perform ADLs (bathing, care of mouth/teeth, toileting, grooming, dressing, etc )  - Assess/evaluate cause of self-care deficits   - Assess range of motion  - Assess patient's mobility; develop plan if impaired  - Assess patient's need for assistive devices and provide as appropriate  - Encourage maximum independence but intervene and supervise when necessary  - Involve family in performance of ADLs  - Assess for home care needs following discharge   - Consider OT consult to assist with ADL evaluation and planning for discharge  - Provide patient education as appropriate  Outcome: Progressing  Goal: Maintains/Returns to pre admission functional level  Description: INTERVENTIONS:  - Perform BMAT or MOVE assessment daily    - Set and communicate daily mobility goal to care team and patient/family/caregiver     - Collaborate with rehabilitation services on mobility goals if consulted  - Out of bed for toileting  - Record patient progress and toleration of activity level   Outcome: Progressing     Problem: DISCHARGE PLANNING  Goal: Discharge to home or other facility with appropriate resources  Description: INTERVENTIONS:  - Identify barriers to discharge w/patient and caregiver  - Arrange for needed discharge resources and transportation as appropriate  - Identify discharge learning needs (meds, wound care, etc )  - Arrange for interpretive services to assist at discharge as needed  - Refer to Case Management Department for coordinating discharge planning if the patient needs post-hospital services based on physician/advanced practitioner order or complex needs related to functional status, cognitive ability, or social support system  Outcome: Progressing     Problem: Knowledge Deficit  Goal: Patient/family/caregiver demonstrates understanding of disease process, treatment plan, medications, and discharge instructions  Description: Complete learning assessment and assess knowledge base    Interventions:  - Provide teaching at level of understanding  - Provide teaching via preferred learning methods  Outcome: Progressing

## 2022-09-03 NOTE — ED ATTENDING ATTESTATION
9/3/2022  IReina DO, saw and evaluated the patient  I have discussed the patient with the resident/non-physician practitioner and agree with the resident's/non-physician practitioner's findings, Plan of Care, and MDM as documented in the resident's/non-physician practitioner's note, except where noted  All available labs and Radiology studies were reviewed  I was present for key portions of any procedure(s) performed by the resident/non-physician practitioner and I was immediately available to provide assistance  At this point I agree with the current assessment done in the Emergency Department  I have conducted an independent evaluation of this patient a history and physical is as follows:    History provided by:  Patient  Difficulty Urinating  Presenting symptoms: no dysuria    Presenting symptoms comment:  Urinary retention     Relieved by:  Nothing  Worsened by:  Nothing  Ineffective treatments:  None tried  Associated symptoms: abdominal pain (suprapubic pain )    Associated symptoms: no diarrhea, no fever, no flank pain and no nausea     are as follows /55 (BP Location: Left arm)   Pulse 88   Temp 99 3 °F (37 4 °C) (Oral)   Resp 17   Ht 5' 2" (1 575 m)   Wt 55 3 kg (122 lb)   SpO2 98%   BMI 22 31 kg/m²   Review of Systems Review of Systems   Constitutional: Negative for chills and fever  HENT: Negative for rhinorrhea, sore throat and trouble swallowing  Eyes: Negative for pain  Respiratory: Negative for cough, shortness of breath, wheezing and stridor  Cardiovascular: Negative for chest pain and leg swelling  Gastrointestinal: Positive for abdominal pain (suprapubic pain )  Negative for diarrhea and nausea  Endocrine: Negative for polyuria  Genitourinary: Negative for dysuria, flank pain and urgency  Musculoskeletal: Negative for joint swelling, myalgias and neck stiffness  Skin: Negative for rash     Allergic/Immunologic: Negative for immunocompromised state    Neurological: Negative for dizziness, syncope, weakness, numbness and headaches  Psychiatric/Behavioral: Negative for confusion and suicidal ideas  All other systems reviewed and are negative  Physical Exam remarkable for Physical Exam  Vitals and nursing note reviewed  Constitutional:       Appearance: He is well-developed  HENT:      Head: Normocephalic and atraumatic  Eyes:      Pupils: Pupils are equal, round, and reactive to light  Cardiovascular:      Rate and Rhythm: Normal rate and regular rhythm  Heart sounds: Normal heart sounds  No murmur heard  No friction rub  Pulmonary:      Effort: Pulmonary effort is normal  No respiratory distress  Breath sounds: No wheezing or rales  Abdominal:      General: Bowel sounds are normal       Palpations: Abdomen is soft  There is no mass  Tenderness: There is abdominal tenderness  There is no guarding  Comments: Suprapubic pain      Genitourinary:     Penis: Normal     Musculoskeletal:      Cervical back: Normal range of motion and neck supple  Skin:     General: Skin is warm  Findings: No rash  Neurological:      Mental Status: He is alert and oriented to person, place, and time  Work up and treatment plan discussed with Treatment Team: Attending Provider: Wong Hernandez MD; Consulting Physician: Sammy Corrales MD; Registered Nurse: Shelley Still RN; Patient Care Assistant: Ashley Mccabe and agreed upon plan  MDM  Number of Diagnoses or Management Options  Intractable pain: new, needed workup  Urinary retention: new, needed workup  Diagnosis management comments: 10-year-old male presents emergency department noted suprapubic abdominal discomfort  Urinary retention history of bladder cancer with noted radiation  Multiple times to a passive fully here in the emergency department by Emergency Department nurse with no success    Patient able to void but still with noted urinary retention will call Urology for noted year Webb placement               Clinical Impression:    Final diagnoses:   Urinary retention   Intractable pain         Disposition    admitted to the hospital           New Prescriptions:    Current Discharge Medication List               Follow-up Instructions:    Isabel Mata DO  [address] [place] [address]el[address]  [phone]    Call   As needed    [facility] [address] [address] [phone]  Call           ED Course         Critical Care Time  Procedures

## 2022-09-03 NOTE — ASSESSMENT & PLAN NOTE
· Patient is a 17-year-old male with a past medical history of alcoholic cirrhosis, status post liver transplant in 2003, insulin-dependent type 2 diabetes, neuropathy, CKD stage 4 secondary to tacrolimus, coronary artery disease, history of stroke and bladder cancer  · On Friday patient stopped making urine  Developed lowe abdominal pain    Only able to put out of few drops urine, denies hematuria  · CT abdomen pelvis pending  · Bladder scan showed >200 cc, but nurses unsure if it is accurate  · In the ED nurses unable to place Webb  · Bladder scan  · Urology consult  · Pain management

## 2022-09-03 NOTE — H&P
[facility]  H&P- Renetta Xiong 1950, 74 y o  male MRN: [id number]  Unit/Bed#: CRB Encounter: [id number]  Primary Care Provider: Oanh Cardoso DO   Date and time admitted to hospital: 9/3/2022  5:54 AM    * Urinary retention  Assessment & Plan  · Patient is a 64-year-old male with a past medical history of alcoholic cirrhosis, status post liver transplant in 2003, insulin-dependent type 2 diabetes, neuropathy, CKD stage 4 secondary to tacrolimus, coronary artery disease, history of stroke and bladder cancer  · On Friday patient stopped making urine  Developed lowe abdominal pain  Only able to put out of few drops urine, denies hematuria  · CT abdomen pelvis pending  · Bladder scan showed >200 cc, but nurses unsure if it is accurate  · In the ED nurses unable to place Webb  · Bladder scan  · Urology consult  · Pain management    Bladder tumor  Assessment & Plan  · Patient was recently diagnosed with bladder cancer  · Status post chemo and radiation, last session of radiation last week  · Follows Hematology    CKD (chronic kidney disease)  Assessment & Plan  Lab Results   Component Value Date    EGFR 20 09/03/2022    EGFR 21 08/19/2022    EGFR 20 08/05/2022    CREATININE 2 88 (H) 09/03/2022    CREATININE 2 75 (H) 08/19/2022    CREATININE 2 87 (H) 08/05/2022   · CKD stage 4  · Creatinine at baseline  · Monitor BMP    Hydronephrosis of right kidney  Assessment & Plan  · Right-sided hydroureteronephrosis, status post right-sided nephrostomy tube in May 2022    History of cirrhosis  Assessment & Plan  · History of liver cirrhosis, history of liver transplant at [facility] [place]  · Continue tacrolimus 1 mg q 12 hours    Controlled diabetes mellitus with diabetic neuropathy, with long-term current use of insulin (HCC)  Assessment & Plan  Lab Results   Component Value Date    HGBA1C 6 8 (H) 08/19/2022       No results for input(s): POCGLU in the last 72 hours      Blood Sugar Average: Last 72 hrs:  ·  home regimen: on Levemir 18 units at bedtime and sliding scale  · Per wife with 18 units of Levemir his blood sugar drops in the morning occasionally  · While in hospital start Levemir 10 units at bedtime and sliding scale  · Diabetic diet    History of CVA (cerebrovascular accident)  Assessment & Plan  · On Plavix, continue since no hematuria      VTE Pharmacologic Prophylaxis: VTE Score: 4 Moderate Risk (Score 3-4) - Pharmacological DVT Prophylaxis Ordered: heparin  Code Status: Level 1 - Full Code   Discussion with family: Updated  (wife) via phone  Anticipated Length of Stay: Patient will be admitted on an observation basis with an anticipated length of stay of less than 2 midnights secondary to Urinary retention  Total Time for Visit, including Counseling / Coordination of Care: 45 minutes Greater than 50% of this total time spent on direct patient counseling and coordination of care  Chief Complaint:  Urinary retention    History of Present Illness:  Marques Ross is a 74 y o  male with a PMH of alcoholic cirrhosis status post liver transplant in 2003, insulin-dependent type 2 diabetes, neuropathy, CKD stage 4 secondary to tacrolimus, history of stroke, bladder cancer who presents with urinary retention  Patient was recently diagnosed with bladder cancer  Finished chemo and radiation  Last radiation was last week  He was in his usual state of health when he yesterday  Making urine  He only made a few drops and developed low abdominal pain  Denies hematuria  Review of Systems:  Review of Systems   Constitutional: Negative for activity change, chills and fever  HENT: Negative  Eyes: Negative  Respiratory: Negative for chest tightness and shortness of breath  Cardiovascular: Negative for chest pain, palpitations and leg swelling  Gastrointestinal: Positive for abdominal pain  Negative for abdominal distention  Endocrine: Negative      Genitourinary: Positive for difficulty urinating and penile pain  Negative for flank pain and hematuria  Musculoskeletal: Negative  Skin: Negative  Neurological: Negative  Hematological: Negative  Psychiatric/Behavioral: Negative  Past Medical and Surgical History:   Past Medical History:   Diagnosis Date    Alcoholism ([facility] )     Cerebral artery aneurysm     Change in mental state     last assessed 5/18/15; resolved 10/27/15    Diabetes mellitus ([facility]     Drug dependence ([facility] )     Fatigue     last assessed 1/26/15; resolved 5/24/16    Hepatitis [facility] discharge follow-up 12/21/2018    Kidney disease     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[facility] )     [facility])     Renal disorder     Subdural hygroma     2/27/14; resolved 7/28/15    [facility] ) 9/20/2017       Past Surgical History:   Procedure Laterality Date    BRAIN SURGERY  02/12/2014    left frontotemporal 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    CATARACT EXTRACTION      CHOLECYSTECTOMY      FL LUMBAR PUNCTURE DIAGNOSTIC  12/14/2018    IR NEPHROSTOMY TUBE CHECK/CHANGE/REPOSITION/REINSERTION/UPSIZE  7/29/2022    IR NEPHROSTOMY TUBE CHECK/CHANGE/REPOSITION/REINSERTION/UPSIZE  8/31/2022    IR NEPHROSTOMY TUBE PLACEMENT  5/28/2022    IR PICC LINE  12/14/2018    IR PORT PLACEMENT  6/23/2022    LIVER TRANSPLANTATION      ROTATOR CUFF REPAIR      SHOULDER SURGERY      TRANSURETHRAL RESECTION OF BLADDER TUMOR N/A 5/26/2022    Procedure: TRANSURETHRAL RESECTION OF BLADDER TUMOR (TURBT); Surgeon: Nathan Bassett MD;  Location: [facility];  Service: Urology       Meds/Allergies:  Prior to Admission medications    Medication Sig Start Date End Date Taking?  Authorizing Provider   acetaminophen (TYLENOL) 500 mg tablet Take 1 tablet (500 mg total) by mouth every 6 (six) hours as needed for mild pain  Patient not taking: No sig reported 5/23/21   Denisa Long MD   Alcohol Swabs (B-D SINGLE USE SWABS REGULAR) PADS USE 2-3 TIMES DAILY AS NEEDED  Patient not taking: Reported on 8/19/2022 7/20/22   Historical Provider, MD   Blood Glucose Calibration (OT ULTRA/FASTTK CNTRL SOLN) SOLN USE AS DIRECTED 7/14/22   Bouchra Ode, DO   clopidogrel (PLAVIX) 75 mg tablet Take 1 tablet (75 mg total) by mouth daily 8/30/21   Bouchra Ode, DO   Comfort EZ Pen Needles 32G X 4 MM MISC USE 3-4 AS DIRECTED 5/17/22   Bouchra Ode, DO   Continuous Blood Gluc  (FreeStyle Frank 14 Day [place]) NATALIA Use 1 Device continuous 6/3/22   Bouchra Ode, DO   Continuous Blood Gluc Sensor (FreeStyle Frank 14 Day Sensor) MISC Use 1 Device 4 (four) times a day 6/3/22   Bouchra Ode, DO   Incontinence Supply Disposable (Incontinence Brief Medium) MISC Use 4 (four) times a day 8/19/22 9/18/22  Greg Parnell MD   insulin detemir (Levemir FlexTouch) 100 Units/mL injection pen Inject 18 Units under the skin daily at bedtime 5/30/22   Jorge Alberto Martin MD   INSULIN SYRINGE 1CC/29G 29G X 1/2" 1 ML MISC by Does not apply route 1/11/12   Historical Provider, MD   Lancet Devices (Lancing Device) MISC USE AS DIRECTED 7/14/22   Bouchra Ode, DO   Lancets MISC by Does not apply route 5/12/17   Historical Provider, MD   loperamide (IMODIUM) 2 mg capsule Take 1 capsule (2 mg total) by mouth 2 (two) times a day as needed for diarrhea [facility] por cynthia si tiene diarrea 8/26/22   Billy Gomez,    Nutritional Supplements (Glucerna Shake) LIQD Take 1 Bottle by mouth 3 (three) times a day 8/19/22   Greg Parnell MD   ondansetron [facility]) 4 mg tablet Take 1 tablet (4 mg total) by mouth every 8 (eight) hours as needed for nausea or vomiting 6/21/22   Maryanne Roberson MD   OneTouch Ultra test strip TEST UP TO 3 TIMES A DAY 1/5/22   Bouchra Ode, DO   phenazopyridine (PYRIDIUM) 100 mg tablet Take 1 tablet (100 mg total) by mouth 3 (three) times a week [place] en viernes, en lunes, en miercoles, y despues quitalo 7/22/22   Pau Bueno MD   pregabalin (LYRICA) 50 mg capsule TAKE 1 CAPSULE (50 MG TOTAL) BY MOUTH 3 (THREE) TIMES A DAY 5/23/22   Isabel Mata,    rosuvastatin (CRESTOR) 40 MG tablet TAKE ONE (1) TABLET BY MOUTH DAILY 6/9/22   Isabel Mata, DO   sodium chloride, PF, 0 9 % 10 mL by Intracatheter route daily Intracatheter flushing daily  May substitute prefilled syringe with normal saline 10 mL vials, 10 mL syringes, and 18 g blunt needles 7/20/22 10/18/22  YAMILKA Tao   tacrolimus (PROGRAF) 1 mg capsule Take 1 capsule (1 mg total) by mouth every 12 (twelve) hours 8/30/21   Isabel Mata, DO   tamsulosin (FLOMAX) 0 4 mg Take 1 capsule (0 4 mg total) by mouth daily with dinner 8/4/22 11/2/22  Calvin Rios PA-C   temazepam (RESTORIL) 30 mg capsule TAKE 1 CAPSULE (30 MG TOTAL) BY MOUTH DAILY AT BEDTIME 7/27/22   Isabel Mata, DO   triamcinolone (KENALOG) 0 1 % cream Apply topically 2 (two) times a day Apply to affected area 8/4/22   Historical Provider, MD   zolpidem (AMBIEN) 10 mg tablet TAKE 1 TABLET (10 MG TOTAL) BY MOUTH DAILY AT BEDTIME 8/5/22   Isabel Mata, DO     I have reviewed home medications with patient family member  Allergies:    Allergies   Allergen Reactions    Tequin [Gatifloxacin] Rash    Ciprofloxacin     Iv Contrast [Iodinated Diagnostic Agents]     Levofloxacin Rash    Lipitor [Atorvastatin] Rash     Rash and itching    Omnipaque [Iohexol]        Social History:  Marital Status: /Civil Union   Substance Use History:   Social History     Substance and Sexual Activity   Alcohol Use Not Currently     Social History     Tobacco Use   Smoking Status Never Smoker   Smokeless Tobacco Never Used   Tobacco Comment    former smoker per [employer]      Social History     Substance and Sexual Activity   Drug Use No    Comment: remotely quit drug use per [employer]scripts        Family History:  Family History   Problem Relation Age of Onset    Hypertension Mother         benign essential     Lung cancer Father     Diabetes Sister     Cancer Brother     Stroke Other         cva - due to embolism of cerebra artery        Physical Exam:     Vitals:   Blood Pressure: 161/96 (09/03/22 1000)  Pulse: (!) 118 (09/03/22 1000)  Temperature: (!) 97 3 °F (36 3 °C) (09/03/22 0602)  Temp Source: Oral (09/03/22 0602)  Respirations: 18 (09/03/22 1000)  Height: 5' 2" (157 5 cm) (09/03/22 0602)  Weight - Scale: 57 2 kg (126 lb) (09/03/22 0602)  SpO2: 97 % (09/03/22 1000)    Physical Exam  Constitutional:       General: He is not in acute distress  HENT:      Head: Atraumatic  Cardiovascular:      Rate and Rhythm: Regular rhythm  Tachycardia present  Heart sounds: No murmur heard  No friction rub  No gallop  Pulmonary:      Effort: Pulmonary effort is normal  No respiratory distress  Breath sounds: Normal breath sounds  No wheezing  Abdominal:      General: Bowel sounds are normal       Palpations: Abdomen is soft  Comments: Low abdominal tenderness, right-sided nephrostomy tube   Musculoskeletal:         General: No swelling  Cervical back: Neck supple  Skin:     General: Skin is warm and dry  Neurological:      General: No focal deficit present  Mental Status: He is alert     Psychiatric:         Mood and Affect: Mood normal           Additional Data:     Lab Results:  Results from last 7 days   Lab Units 09/03/22  0846   WBC Thousand/uL 8 37   HEMOGLOBIN g/dL 9 1*   HEMATOCRIT % 29 9*   PLATELETS Thousands/uL 109*   NEUTROS PCT % 86*   LYMPHS PCT % 6*   MONOS PCT % 6   EOS PCT % 2     Results from last 7 days   Lab Units 09/03/22  0846   SODIUM mmol/L 137   POTASSIUM mmol/L 4 9   CHLORIDE mmol/L 110*   CO2 mmol/L 24   BUN mg/dL 48*   CREATININE mg/dL 2 88*   ANION GAP mmol/L 3*   CALCIUM mg/dL 8 7   GLUCOSE RANDOM mg/dL 179*                       Imaging:   CT abdomen pelvis wo contrast    (Results Pending)       EKG and Other Studies Reviewed on Admission:   · EKG: No EKG obtained  ** Please Note: This note has been constructed using a voice recognition system   **

## 2022-09-03 NOTE — ASSESSMENT & PLAN NOTE
· History of liver cirrhosis, history of liver transplant at [facility]  · Continue tacrolimus 1 mg q 12 hours

## 2022-09-03 NOTE — ASSESSMENT & PLAN NOTE
Lab Results   Component Value Date    EGFR 20 09/03/2022    EGFR 21 08/19/2022    EGFR 20 08/05/2022    CREATININE 2 88 (H) 09/03/2022    CREATININE 2 75 (H) 08/19/2022    CREATININE 2 87 (H) 08/05/2022   · CKD stage 4  · Creatinine at baseline  · Monitor BMP

## 2022-09-03 NOTE — DISCHARGE INSTRUCTIONS
Follow-up with urology for further care  Contact info provided below if needed  Use over the counter medications as stated on the bottle as needed for pain control  Return to the ED with new or worsening symptoms

## 2022-09-03 NOTE — ED NOTES
Unable to insert flores catheter at this time  Patient did void  Blood noted  Patient verbalized pain relief at this time  Lower abdomen soft and less distended than previous assessment  Dr Brett Murillo made aware       Sushant Lamar RN  09/03/22 [id number]

## 2022-09-03 NOTE — CONSULTS
UROLOGY CONSULTATION NOTE     Patient Identifiers: Dimas Diallo (MRN [id number])  Service Requesting Consultation: ER/SLIM  Service Providing Consultation:  Urology, Evita Briones MD    Date of Service: 9/3/2022    Reason for Consultation: Bladder cancer, nephrostomy tube, dysuria      ASSESSMENT:     72yoM with muscle invasive bladder cancer and RIGHT ureteral obstruction/PCN being treated with chemo/radiation presenting with dysuria and incomplete emptying     PLAN:     The emergency team attempted flores after residual value this AM between 166-200  This was unsuccessful  The patient then underwent CT scan which did demonstrate bladder fullness, RIGHT PCN in place without hydro, LEFT mild hydro to LEFT UVJ  After scan, patient reports he was able to void a copious amount and feels much improved  I personally performed bladder scan after the void, 35cc noted  At this time, I would not recommend further attempt at flores placement given patient's radiation history  Can follow serial PVRs  Patient admitted to medicine  Given recent PCN adjustment and symptoms, consider urine culture testing to rule out UTI  Patient due to see me in November for surveillance cystoscopy 3 months after radiation  Given bladder wall thickening, could consider sooner timeframe as outpatient  Urology will follow while hospitalized  Thank you for allowing me to participate in this patients care  Please do not hesitate to call with any additional questions  Evita Briones MD    History of Present Illness:     Dimas Diallo is a 74 y o  old with a history of muscle invasive bladder cancer and RIGHT ureteral obstruction requiring PCN  Patient was not a surgical candidate and was referred for concurrent chemotherapy and radiation  Patient recently underwent PCN exchange on 8/31  Presents with urinary bother  In the ER, initial PVR was between 166-200cc and attempt at flores placement was unsuccessful   Urology was contacted and we recommended avoiding catheterization  Patient then underwent CT scan  There was new LEFT hydronephrosis and bladder distention  By the time I reached the patient, he had just had copious void and felt much improved  Past Medical, Past Surgical History:     Past Medical History:   Diagnosis Date    [facility]ism [facility]     Cerebral artery aneurysm     Change in mental state     last assessed 5/18/15; resolved 10/27/15    Diabetes mellitus ([facility]     [facility]     Fatigue     last assessed 1/26/15; resolved 5/24/16    Hepatitis [address] discharge follow-up 12/21/2018    Kidney disease     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[facility]     [facility]     Renal disorder     Subdural hygroma     2/27/14; resolved 7/28/15    Thrombocytopenia ([facility] ) 9/20/2017   :    Past Surgical History:   Procedure Laterality Date    BRAIN SURGERY  02/12/2014    left frontotemporal 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    CATARACT EXTRACTION      CHOLECYSTECTOMY      FL LUMBAR PUNCTURE DIAGNOSTIC  12/14/2018    IR NEPHROSTOMY TUBE CHECK/CHANGE/REPOSITION/REINSERTION/UPSIZE  7/29/2022    IR NEPHROSTOMY TUBE CHECK/CHANGE/REPOSITION/REINSERTION/UPSIZE  8/31/2022    IR NEPHROSTOMY TUBE PLACEMENT  5/28/2022    IR PICC LINE  12/14/2018    IR PORT PLACEMENT  6/23/2022    LIVER TRANSPLANTATION      ROTATOR CUFF REPAIR      SHOULDER SURGERY      TRANSURETHRAL RESECTION OF BLADDER TUMOR N/A 5/26/2022    Procedure: TRANSURETHRAL RESECTION OF BLADDER TUMOR (TURBT);   Surgeon: Tracey Ivey MD;  Location: [facility];  Service: Urology   :    Medications, Allergies:     Current Facility-Administered Medications:     acetaminophen (TYLENOL) tablet 488 mg, 488 mg, Oral, Q6H PRN, Donaldo Carcamo MD    [START ON 9/4/2022] clopidogrel (PLAVIX) tablet 75 mg, 75 mg, Oral, Daily, Donaldo Carcamo MD    [START ON 9/4/2022] heparin (porcine) subcutaneous injection 5,000 Units, 5,000 Units, Subcutaneous, Q8H [address] **AND** Platelet count, , , Once, Zuleima Daniel MD    HYDROmorphone HCl (DILAUDID) injection 0 2 mg, 0 2 mg, Intravenous, Q4H PRN, Zuleima Daniel MD, 0 2 mg at 09/03/22 1201    insulin detemir (LEVEMIR) subcutaneous injection 10 Units, 10 Units, Subcutaneous, HS, Maria Eugenia Santa MD    insulin lispro (HumaLOG) 100 units/mL subcutaneous injection 1-5 Units, 1-5 Units, Subcutaneous, TID AC **AND** Fingerstick Glucose (POCT), , , TID AC, Maria Eugenia Santa MD    lidocaine (URO-JET, GLYDO) 2 % urethral/mucosal gel 1 application, 1 application, Urethral, Daily PRN, Andrew Fung MD    ondansetron [facility]) injection 4 mg, 4 mg, Intravenous, Q6H PRN, Zuleima Daniel MD    oxyCODONE (ROXICODONE) IR tablet 2 5 mg, 2 5 mg, Oral, Q6H PRN, Zuleima Daniel MD    oxyCODONE (ROXICODONE) IR tablet 5 mg, 5 mg, Oral, Q6H PRN, Zuleima Daniel MD    polyethylene glycol (MIRALAX) packet 17 g, 17 g, Oral, Daily PRN, Zuleima Daniel MD    pregabalin (LYRICA) capsule 50 mg, 50 mg, Oral, TID, Zuleima Daniel MD, 50 mg at 09/03/22 1201    senna-docusate sodium (SENOKOT S) 8 6-50 mg per tablet 1 tablet, 1 tablet, Oral, BID, Zuleima Daniel MD    sodium chloride (PF) 0 9 % injection 10 mL, 10 mL, Intracatheter, Daily, Maria Eugenia Santa MD, 10 mL at 09/03/22 1218    tacrolimus (PROGRAF) capsule 1 mg, 1 mg, Oral, Q12H, Zuleima Daniel MD    tamsulosin (FLOMAX) capsule 0 4 mg, 0 4 mg, Oral, Daily With Lucía Bardales MD    temazepam (RESTORIL) capsule 30 mg, 30 mg, Oral, HS PRN, Zuleima Daniel MD    zolpidem (AMBIEN) tablet 10 mg, 10 mg, Oral, HS PRN, Zuleima Daniel MD    Current Outpatient Medications:     acetaminophen (TYLENOL) 500 mg tablet, Take 1 tablet (500 mg total) by mouth every 6 (six) hours as needed for mild pain (Patient not taking: No sig reported), Disp: 30 tablet, Rfl: 0    Alcohol Swabs (B-D SINGLE USE SWABS REGULAR) PADS, USE 2-3 TIMES DAILY AS NEEDED (Patient not taking: Reported on 8/19/2022), Disp: , Rfl:     Blood Glucose Calibration (OT ULTRA/FASTTK CNTRL SOLN) SOLN, USE AS DIRECTED, Disp: 1 each, Rfl: 0    clopidogrel (PLAVIX) 75 mg tablet, Take 1 tablet (75 mg total) by mouth daily, Disp: 90 tablet, Rfl: 3    Comfort EZ Pen Needles 32G X 4 MM MISC, USE 3-4 AS DIRECTED, Disp: 100 each, Rfl: 5    Continuous Blood Gluc  (FreeStyle Frank 14 Day Pool) NATALIA, Use 1 Device continuous, Disp: 1 each, Rfl: 0    Continuous Blood Gluc Sensor (FreeStyle Frank 14 Day Sensor) MISC, Use 1 Device 4 (four) times a day, Disp: 1 each, Rfl: 5    Incontinence Supply Disposable (Incontinence Brief Medium) MISC, Use 4 (four) times a day, Disp: 120 each, Rfl: 0    insulin detemir (Levemir FlexTouch) 100 Units/mL injection pen, Inject 18 Units under the skin daily at bedtime, Disp: 15 mL, Rfl: 0    INSULIN SYRINGE 1CC/29G 29G X 1/2" 1 ML MISC, by Does not apply route, Disp: , Rfl:     Lancet Devices (Lancing Device) MISC, USE AS DIRECTED, Disp: 1 each, Rfl: 0    Lancets MISC, by Does not apply route, Disp: , Rfl:     loperamide (IMODIUM) 2 mg capsule, Take 1 capsule (2 mg total) by mouth 2 (two) times a day as needed for diarrhea [place] carmen tableta dos veces por cynthia si tiene diarrea, Disp: 90 capsule, Rfl: 0    Nutritional Supplements (Glucerna Shake) LIQD, Take 1 Bottle by mouth 3 (three) times a day, Disp: 81066 mL, Rfl: 0    ondansetron (ZOFRAN) 4 mg tablet, Take 1 tablet (4 mg total) by mouth every 8 (eight) hours as needed for nausea or vomiting, Disp: 20 tablet, Rfl: 1    OneTouch Ultra test strip, TEST UP TO 3 TIMES A DAY, Disp: 100 each, Rfl: 0    phenazopyridine (PYRIDIUM) 100 mg tablet, Take 1 tablet (100 mg total) by mouth 3 (three) times a week [place] en viernes, en lunes, en miercoles, y despues quitalo, Disp: 3 tablet, Rfl: 0    pregabalin (LYRICA) 50 mg capsule, TAKE 1 CAPSULE (50 MG TOTAL) BY MOUTH 3 (THREE) TIMES A DAY, Disp: 90 capsule, Rfl: 5    rosuvastatin (CRESTOR) 40 MG tablet, TAKE ONE (1) TABLET BY MOUTH DAILY, Disp: 30 tablet, Rfl: 5    sodium chloride, PF, 0 9 %, 10 mL by Intracatheter route daily Intracatheter flushing daily  May substitute prefilled syringe with normal saline 10 mL vials, 10 mL syringes, and 18 g blunt needles, Disp: 300 mL, Rfl: 2    tacrolimus (PROGRAF) 1 mg capsule, Take 1 capsule (1 mg total) by mouth every 12 (twelve) hours, Disp: 180 capsule, Rfl: 3    tamsulosin (FLOMAX) 0 4 mg, Take 1 capsule (0 4 mg total) by mouth daily with dinner, Disp: 90 capsule, Rfl: 0    temazepam (RESTORIL) 30 mg capsule, TAKE 1 CAPSULE (30 MG TOTAL) BY MOUTH DAILY AT BEDTIME, Disp: 30 capsule, Rfl: 5    triamcinolone (KENALOG) 0 1 % cream, Apply topically 2 (two) times a day Apply to affected area, Disp: , Rfl:     zolpidem (AMBIEN) 10 mg tablet, TAKE 1 TABLET (10 MG TOTAL) BY MOUTH DAILY AT BEDTIME, Disp: 30 tablet, Rfl: 3    Allergies: Allergies   Allergen Reactions    Tequin [Gatifloxacin] Rash    Ciprofloxacin     Iv Contrast [Iodinated Diagnostic Agents]     Levofloxacin Rash    Lipitor [Atorvastatin] Rash     Rash and itching    Omnipaque [Iohexol]    :    Social and Family History:   Social History:   Social History     Tobacco Use    Smoking status: Never Smoker    Smokeless tobacco: Never Used    Tobacco comment: former smoker per [employer]    Vaping Use    Vaping Use: Never used   Substance Use Topics    Alcohol use: Not Currently    Drug use: No     Comment: remotely quit drug use per [employer]scripts          Social History     Tobacco Use   Smoking Status Never Smoker   Smokeless Tobacco Never Used   Tobacco Comment    former smoker per [employer]scripts        Family History:  Family History   Problem Relation Age of Onset    Hypertension Mother         benign essential     Lung cancer Father     Diabetes Sister     Cancer Brother     Stroke Other         cva - due to embolism of cerebra artery    :     Review of Systems:     General: Fever, chills, or night sweats: negative  Cardiac: Negative for chest pain  Pulmonary: Negative for shortness of breath  Gastrointestinal: Abdominal pain positive  Nausea, vomiting, or diarrhea negative,  Genitourinary: See HPI above  Patient does have hematuria  All other systems queried were negative  Physical Exam:   General: Patient is pleasant and in NAD  Awake and alert  /69 (BP Location: Right arm)   Pulse 94   Temp (!) 97 3 °F (36 3 °C) (Oral)   Resp 18   Ht 5' 2" (1 575 m)   Wt 57 2 kg (126 lb)   SpO2 98%   BMI 23 05 kg/m²   Cardiac: Peripheral edema: negative  Pulmonary: Non-labored breathing  Abdomen: Soft, non-tender, non-distended  RUQ surgical scars  No masses, tenderness, hernias noted  Genitourinary: Negative CVA tenderness in the LEFT flank, RIGHT flank with PCN-light pink, negative suprapubic tenderness  (Male): Penis uncircumcised, phallus normal, meatus patent  Testicles descended into scrotum bilaterally without masses nor tenderness  No inguinal hernias bilaterally  Labs:     Lab Results   Component Value Date    HGB 9 1 (L) 09/03/2022    HCT 29 9 (L) 09/03/2022    WBC 8 37 09/03/2022     (L) 09/03/2022   /]    Lab Results   Component Value Date     12/17/2015    K 4 9 09/03/2022     (H) 09/03/2022    CO2 24 09/03/2022    BUN 48 (H) 09/03/2022    CREATININE 2 88 (H) 09/03/2022    CALCIUM 8 7 09/03/2022    GLUCOSE 179 (H) 08/05/2022     Urine Culture >100,000 cfu/ml Escherichia coli Abnormal     This organism has been edited   The previous result was Gram Negative Trey Enteric Like on 8/4/2022 at 1016 EDT    20,000-29,000 cfu/ml Enterococcus faecalis Abnormal               Susceptibility     Escherichia coli Enterococcus faecalis     FELISHA FELISHA     Amoxicillin + Clavulanate <=8/4 ug/ml Susceptible       Ampicillin ($$) >16 00 ug/ml Resistant <=2 00 ug/ml Susceptible     Ampicillin + Sulbactam ($) 16/8 ug/ml Intermediate       Aztreonam ($$$)  <=4 ug/ml Susceptible       Cefazolin ($) <=2 00 ug/ml Susceptible       Ciprofloxacin ($)  <=0 25 ug/ml Susceptible       Ertapenem ($$$) <=0 5 ug/ml Susceptible       Gentamicin ($$) <=2 ug/ml Susceptible       Levofloxacin ($) <=0 50 ug/ml Susceptible 1 00 ug/ml Susceptible     Nitrofurantoin <=32 ug/ml Susceptible <=32 ug/ml Susceptible     Piperacillin + Tazobactam ($$$) <=8 ug/ml Susceptible       Tetracycline <=4 ug/ml Susceptible >8 ug/ml Resistant     Tobramycin ($) <=2 ug/ml Susceptible       Trimethoprim + Sulfamethoxazole ($$$) <=0 5/9 5 u  Susceptible       Vancomycin ($)   1 00 ug/ml Susceptible     ZID Performed Yes   Yes                      Specimen Collected: 08/03/22 08:29 Last Resulted: 08/06/22 08:39               Imaging:   I personally reviewed the images and report of the following studies, and reviewed them with the patient:    9/3/22  CT ABDOMEN AND PELVIS WITHOUT IV CONTRAST     INDICATION:   Flank pain, kidney stone suspected  flank pain      COMPARISON:  June 9, 2022     TECHNIQUE:  CT examination of the abdomen and pelvis was performed without intravenous contrast  Axial, sagittal, and coronal 2D reformatted images were created from the source data and submitted for interpretation       Radiation dose length product (DLP) for this visit:  316 03 mGy-cm   This examination, like all CT scans performed in the [facility], was performed utilizing techniques to minimize radiation dose exposure, including the use of iterative   reconstruction and automated exposure control       Enteric contrast was not administered       FINDINGS:     ABDOMEN     LOWER CHEST:  Central venous catheter noted at the cavoatrial junction  Mild bibasilar atelectasis      LIVER/BILIARY TREE:  Unremarkable      GALLBLADDER:  No calcified gallstones   No pericholecystic inflammatory change      SPLEEN:  Unremarkable      PANCREAS:  Unremarkable      ADRENAL GLANDS:  Unremarkable      KIDNEYS/URETERS:  Right percutaneous nephrostomy tube noted and terminating in the renal pelvis, unchanged from previous examination  Again noted is atrophy and cortical thinning in the right kidney  Bubble of gas again noted at the upper pole right   renal collecting system  No right-sided hydronephrosis  There is mild left hydroureteronephrosis to the level of the ureterovesical junction possibly the result of bladder distention  No left-sided urinary tract calculi      STOMACH AND BOWEL:  Unremarkable      APPENDIX:  A normal appendix was visualized      ABDOMINOPELVIC CAVITY:  No ascites  No pneumoperitoneum  No lymphadenopathy      VESSELS:  Heavy atherosclerotic vascular calcification  No abdominal aortic aneurysm      PELVIS     REPRODUCTIVE ORGANS:  Extensive prostatic calcifications reidentified      URINARY BLADDER:  Extensive bladder wall thickening, slightly asymmetrically prominent in the posterior right urinary bladder  Mild perivesical inflammatory stranding with inflammatory stranding extending upwards into the space of Retzius  No bladder   calculi      ABDOMINAL WALL/INGUINAL REGIONS:  Injection granulomata noted in the body wall      OSSEOUS STRUCTURES:  Lumbar and bilateral hip osteoarthritic degenerative changes  Mild central collapse of the inferior L2 endplate at the site of a prominent Schmorl's node  Healed right lower rib fractures reidentified  No acute fracture or   destructive osseous lesion      IMPRESSION:     Unchanged appearance of right percutaneous nephrostomy catheter within the collecting system with atrophic right kidney  A bubble of gas within the right renal collecting system is reidentified      Bladder distention, bladder wall thickening, and perivesical inflammatory stranding suggests cystitis    Asymmetric thickening in the posterior right urinary bladder is similar when compared to June 9, 2022 and on this noncontrast examination, plaque like   bladder neoplasm cannot be excluded with confidence  Mild fullness of left renal collecting system and ureter probably related to bladder distention but without left-sided urinary tract calculi evident

## 2022-09-03 NOTE — ASSESSMENT & PLAN NOTE
· Patient was recently diagnosed with bladder cancer  · Status post chemo and radiation, last session of radiation last week  · Follows Hematology

## 2022-09-04 ENCOUNTER — APPOINTMENT (OUTPATIENT)
Dept: RADIOLOGY | Facility: HOSPITAL | Age: 72
DRG: 690 | End: 2022-09-04
Payer: COMMERCIAL

## 2022-09-04 PROBLEM — D64.9 ANEMIA: Status: ACTIVE | Noted: 2022-09-04

## 2022-09-04 LAB
ANION GAP SERPL CALCULATED.3IONS-SCNC: 6 MMOL/L (ref 4–13)
BASOPHILS # BLD AUTO: 0.02 THOUSANDS/ΜL (ref 0–0.1)
BASOPHILS NFR BLD AUTO: 0 % (ref 0–1)
BUN SERPL-MCNC: 49 MG/DL (ref 5–25)
CALCIUM SERPL-MCNC: 8 MG/DL (ref 8.3–10.1)
CHLORIDE SERPL-SCNC: 108 MMOL/L (ref 96–108)
CO2 SERPL-SCNC: 22 MMOL/L (ref 21–32)
CREAT SERPL-MCNC: 2.95 MG/DL (ref 0.6–1.3)
EOSINOPHIL # BLD AUTO: 0.13 THOUSAND/ΜL (ref 0–0.61)
EOSINOPHIL NFR BLD AUTO: 2 % (ref 0–6)
ERYTHROCYTE [DISTWIDTH] IN BLOOD BY AUTOMATED COUNT: 17.6 % (ref 11.6–15.1)
GFR SERPL CREATININE-BSD FRML MDRD: 19 ML/MIN/1.73SQ M
GLUCOSE P FAST SERPL-MCNC: 140 MG/DL (ref 65–99)
GLUCOSE SERPL-MCNC: 131 MG/DL (ref 65–140)
GLUCOSE SERPL-MCNC: 140 MG/DL (ref 65–140)
GLUCOSE SERPL-MCNC: 147 MG/DL (ref 65–140)
GLUCOSE SERPL-MCNC: 230 MG/DL (ref 65–140)
GLUCOSE SERPL-MCNC: 84 MG/DL (ref 65–140)
HCT VFR BLD AUTO: 25.4 % (ref 36.5–49.3)
HGB BLD-MCNC: 7.6 G/DL (ref 12–17)
IMM GRANULOCYTES # BLD AUTO: 0.03 THOUSAND/UL (ref 0–0.2)
IMM GRANULOCYTES NFR BLD AUTO: 1 % (ref 0–2)
LYMPHOCYTES # BLD AUTO: 0.43 THOUSANDS/ΜL (ref 0.6–4.47)
LYMPHOCYTES NFR BLD AUTO: 7 % (ref 14–44)
MAGNESIUM SERPL-MCNC: 1.5 MG/DL (ref 1.6–2.6)
MCH RBC QN AUTO: 24.4 PG (ref 26.8–34.3)
MCHC RBC AUTO-ENTMCNC: 29.9 G/DL (ref 31.4–37.4)
MCV RBC AUTO: 81 FL (ref 82–98)
MONOCYTES # BLD AUTO: 0.76 THOUSAND/ΜL (ref 0.17–1.22)
MONOCYTES NFR BLD AUTO: 12 % (ref 4–12)
NEUTROPHILS # BLD AUTO: 4.99 THOUSANDS/ΜL (ref 1.85–7.62)
NEUTS SEG NFR BLD AUTO: 78 % (ref 43–75)
NRBC BLD AUTO-RTO: 0 /100 WBCS
PLATELET # BLD AUTO: 75 THOUSANDS/UL (ref 149–390)
PMV BLD AUTO: 12.1 FL (ref 8.9–12.7)
POTASSIUM SERPL-SCNC: 4.8 MMOL/L (ref 3.5–5.3)
RBC # BLD AUTO: 3.12 MILLION/UL (ref 3.88–5.62)
SODIUM SERPL-SCNC: 136 MMOL/L (ref 135–147)
WBC # BLD AUTO: 6.36 THOUSAND/UL (ref 4.31–10.16)

## 2022-09-04 PROCEDURE — 76770 US EXAM ABDO BACK WALL COMP: CPT

## 2022-09-04 PROCEDURE — 99232 SBSQ HOSP IP/OBS MODERATE 35: CPT | Performed by: UROLOGY

## 2022-09-04 PROCEDURE — 80048 BASIC METABOLIC PNL TOTAL CA: CPT | Performed by: INTERNAL MEDICINE

## 2022-09-04 PROCEDURE — 82948 REAGENT STRIP/BLOOD GLUCOSE: CPT

## 2022-09-04 PROCEDURE — 99232 SBSQ HOSP IP/OBS MODERATE 35: CPT | Performed by: INTERNAL MEDICINE

## 2022-09-04 PROCEDURE — 83735 ASSAY OF MAGNESIUM: CPT | Performed by: INTERNAL MEDICINE

## 2022-09-04 PROCEDURE — 85025 COMPLETE CBC W/AUTO DIFF WBC: CPT | Performed by: INTERNAL MEDICINE

## 2022-09-04 RX ADMIN — SENNOSIDES, DOCUSATE SODIUM 1 TABLET: 8.6; 5 TABLET ORAL at 17:37

## 2022-09-04 RX ADMIN — ACETAMINOPHEN 488 MG: 325 TABLET ORAL at 01:25

## 2022-09-04 RX ADMIN — HEPARIN SODIUM 5000 UNITS: 5000 INJECTION INTRAVENOUS; SUBCUTANEOUS at 05:48

## 2022-09-04 RX ADMIN — INSULIN LISPRO 2 UNITS: 100 INJECTION, SOLUTION INTRAVENOUS; SUBCUTANEOUS at 17:37

## 2022-09-04 RX ADMIN — SENNOSIDES, DOCUSATE SODIUM 1 TABLET: 8.6; 5 TABLET ORAL at 09:26

## 2022-09-04 RX ADMIN — CEFTRIAXONE SODIUM 1000 MG: 10 INJECTION, POWDER, FOR SOLUTION INTRAVENOUS at 11:23

## 2022-09-04 RX ADMIN — HYDROMORPHONE HYDROCHLORIDE 0.2 MG: 0.2 INJECTION, SOLUTION INTRAMUSCULAR; INTRAVENOUS; SUBCUTANEOUS at 17:47

## 2022-09-04 RX ADMIN — PREGABALIN 50 MG: 50 CAPSULE ORAL at 15:38

## 2022-09-04 RX ADMIN — TACROLIMUS 1 MG: 1 CAPSULE ORAL at 22:00

## 2022-09-04 RX ADMIN — PREGABALIN 50 MG: 50 CAPSULE ORAL at 22:00

## 2022-09-04 RX ADMIN — TAMSULOSIN HYDROCHLORIDE 0.4 MG: 0.4 CAPSULE ORAL at 15:49

## 2022-09-04 RX ADMIN — INSULIN DETEMIR 12 UNITS: 100 INJECTION, SOLUTION SUBCUTANEOUS at 22:00

## 2022-09-04 RX ADMIN — OXYCODONE HYDROCHLORIDE 5 MG: 5 TABLET ORAL at 15:48

## 2022-09-04 RX ADMIN — HEPARIN SODIUM 5000 UNITS: 5000 INJECTION INTRAVENOUS; SUBCUTANEOUS at 21:59

## 2022-09-04 RX ADMIN — CLOPIDOGREL BISULFATE 75 MG: 75 TABLET ORAL at 09:26

## 2022-09-04 RX ADMIN — ZOLPIDEM TARTRATE 10 MG: 5 TABLET ORAL at 22:03

## 2022-09-04 RX ADMIN — PREGABALIN 50 MG: 50 CAPSULE ORAL at 09:26

## 2022-09-04 RX ADMIN — TACROLIMUS 1 MG: 1 CAPSULE ORAL at 09:27

## 2022-09-04 RX ADMIN — SODIUM CHLORIDE 10 ML: 9 INJECTION, SOLUTION INTRAMUSCULAR; INTRAVENOUS; SUBCUTANEOUS at 09:31

## 2022-09-04 RX ADMIN — HEPARIN SODIUM 5000 UNITS: 5000 INJECTION INTRAVENOUS; SUBCUTANEOUS at 15:38

## 2022-09-04 NOTE — ASSESSMENT & PLAN NOTE
Lab Results   Component Value Date    EGFR 19 09/04/2022    EGFR 20 09/03/2022    EGFR 21 08/19/2022    CREATININE 2 95 (H) 09/04/2022    CREATININE 2 88 (H) 09/03/2022    CREATININE 2 75 (H) 08/19/2022   · CKD stage 4  · Creatinine at baseline  · Cont to monitor BMP

## 2022-09-04 NOTE — ASSESSMENT & PLAN NOTE
· Likely secondary to acute cystitis  · Pt with hx of bladder cancer undergoing chemo/RT  · S/p CT abdomen pelvis bladder wall thickening  · S/p bladder scan w/o significant PVR  · Appreciate input per urology  · On IV CTX, f/u on urine cx  · + R PCN in place, no RT hydronephrosis  · + mild L hydroureteronephrosis  Pending US  · Cont serial bladder scan  PVR has remained < 100cc   No indication for flores catheter

## 2022-09-04 NOTE — ASSESSMENT & PLAN NOTE
· Patient was recently diagnosed with bladder cancer  · Status post chemo and radiation, last session of radiation last week  · Follows with Hematology/oncology and palliative

## 2022-09-04 NOTE — ASSESSMENT & PLAN NOTE
· History of liver cirrhosis, history of liver transplant at [facility]  · Continue tacrolimus 1 mg q 12 hours

## 2022-09-04 NOTE — ASSESSMENT & PLAN NOTE
· Hx of Right-sided hydroureteronephrosis, status post right-sided nephrostomy tube in May 2022  · S/p present CT a/p no r hydronephrosis

## 2022-09-04 NOTE — ASSESSMENT & PLAN NOTE
· Likely due to anemia of chronic disease; hx of bladder ca s/p chemo  · No sign of acute blood loss  · Cont to monitor

## 2022-09-04 NOTE — ASSESSMENT & PLAN NOTE
Lab Results   Component Value Date    HGBA1C 6 8 (H) 08/19/2022       Recent Labs     09/03/22  2128 09/04/22  0618 09/04/22  1044 09/04/22  1620   POCGLU 169* 147* 131 230*       Blood Sugar Average: Last 72 hrs:  · (P) 161 [id number] home regimen: on Levemir 18 units at bedtime and sliding scale  · Per wife with 18 units of Levemir his blood sugar drops in the morning occasionally  · While in hospital start Levemir 10 units at bedtime and sliding scale  · Diabetic diet

## 2022-09-04 NOTE — PLAN OF CARE
Problem: PAIN - ADULT  Goal: Verbalizes/displays adequate comfort level or baseline comfort level  Description: Interventions:  - Encourage patient to monitor pain and request assistance  - Assess pain using appropriate pain scale  - Administer analgesics based on type and severity of pain and evaluate response  - Implement non-pharmacological measures as appropriate and evaluate response  - Consider cultural and social influences on pain and pain management  - Notify physician/advanced practitioner if interventions unsuccessful or patient reports new pain  Outcome: Progressing     Problem: INFECTION - ADULT  Goal: Absence or prevention of progression during hospitalization  Description: INTERVENTIONS:  - Assess and monitor for signs and symptoms of infection  - Monitor lab/diagnostic results  - Monitor all insertion sites, i e  indwelling lines, tubes, and drains  - Monitor endotracheal if appropriate and nasal secretions for changes in amount and color  - [place] appropriate cooling/warming therapies per order  - Administer medications as ordered  - Instruct and encourage patient and family to use good hand hygiene technique  - Identify and instruct in appropriate isolation precautions for identified infection/condition  Outcome: Progressing

## 2022-09-04 NOTE — PROGRESS NOTES
UROLOGY PROGRESS NOTE   Patient Identifiers: Renetta Xiong (MRN [id number])  Date of Service: 9/4/2022        Assessment:   20-year-old male with muscle invasive bladder cancer and right ureteral obstruction undergoing chemotherapy radiation presenting with dysuria and left hydronephrosis     Plan:     Despite initial dysuria and difficulty emptying, the patient has had bedside postvoid residuals last at 10:00 a m  Last evening that are less than 50 cc  There is urine at the bedside that is clear yellow  Patient feels improved  Formal renal bladder ultrasound will be performed today primarily to assess the left hydronephrosis along with ureteral jet and a formal postvoid residual      At this point time, the patient does not require urethral Webb catheter placement  Urine cultures pending to assess for infection  On empiric Rocephin    Outpatient follow-up will continue for the patient's bladder cancer history with likely cystoscopy planned in November      Subjective:     24 HR EVENTS:   Patient continues to void and feel improved         Objective:     VITALS:    Vitals:    09/04/22 0816   BP: 110/61   Pulse: 98   Resp: 18   Temp: 99 1 °F (37 3 °C)   SpO2: 99%       INS & OUTS:  490 cc of urine output recorded    LABS:  Lab Results   Component Value Date    HGB 7 6 (L) 09/04/2022    HCT 25 4 (L) 09/04/2022    WBC 6 36 09/04/2022    PLT 75 (L) 09/04/2022   ]    Lab Results   Component Value Date     12/17/2015    K 4 8 09/04/2022     09/04/2022    CO2 22 09/04/2022    BUN 49 (H) 09/04/2022    CREATININE 2 95 (H) 09/04/2022    CALCIUM 8 0 (L) 09/04/2022    GLUCOSE 179 (H) 08/05/2022   ]    INPATIENT MEDS:    Current Facility-Administered Medications:     acetaminophen (TYLENOL) tablet 488 mg, 488 mg, Oral, Q6H PRN, Jonnathan Stephenson MD, 488 mg at 09/04/22 0125    cefTRIAXone (ROCEPHIN) 1,000 mg in dextrose 5 % 50 mL IVPB, 1,000 mg, Intravenous, Q24H, Ashley Carreon DO, Last Rate: 100 mL/hr at 09/04/22 1123, 1,000 mg at 09/04/22 1123    clopidogrel (PLAVIX) tablet 75 mg, 75 mg, Oral, Daily, Trish Horn MD, 75 mg at 09/04/22 0926    heparin (porcine) subcutaneous injection 5,000 Units, 5,000 Units, Subcutaneous, Q8H [address], 5,000 Units at 09/04/22 0548 **AND** Platelet count, , , Once, Trish Horn MD    HYDROmorphone HCl (DILAUDID) injection 0 2 mg, 0 2 mg, Intravenous, Q4H PRN, Trish Horn MD, 0 2 mg at 09/03/22 1812    insulin detemir (LEVEMIR) subcutaneous injection 10 Units, 10 Units, Subcutaneous, HS, Trish Horn MD, 10 Units at 09/03/22 2230    insulin lispro (HumaLOG) 100 units/mL subcutaneous injection 1-5 Units, 1-5 Units, Subcutaneous, TID AC **AND** Fingerstick Glucose (POCT), , , TID AC, Maria Eugenia Santa MD    ondansetron (ZOFRAN) injection 4 mg, 4 mg, Intravenous, Q6H PRN, Trish Horn MD    oxyCODONE (ROXICODONE) IR tablet 2 5 mg, 2 5 mg, Oral, Q6H PRN, Trish Horn MD    oxyCODONE (ROXICODONE) IR tablet 5 mg, 5 mg, Oral, Q6H PRN, Trish Horn MD, 5 mg at 09/03/22 2229    polyethylene glycol (MIRALAX) packet 17 g, 17 g, Oral, Daily PRN, Trish Horn MD    pregabalin (LYRICA) capsule 50 mg, 50 mg, Oral, TID, Trish Horn MD, 50 mg at 09/04/22 0926    senna-docusate sodium (SENOKOT S) 8 6-50 mg per tablet 1 tablet, 1 tablet, Oral, BID, Trish Horn MD, 1 tablet at 09/04/22 0926    sodium chloride (PF) 0 9 % injection 10 mL, 10 mL, Intracatheter, Daily, Trish Horn MD, 10 mL at 09/04/22 0931    tacrolimus (PROGRAF) capsule 1 mg, 1 mg, Oral, Q12H, Maria Eugenia Santa MD, 1 mg at 09/04/22 6108    tamsulosin (FLOMAX) capsule 0 4 mg, 0 4 mg, Oral, Daily With Dinner, Trish Horn MD, 0 4 mg at 09/03/22 1812    temazepam (RESTORIL) capsule 30 mg, 30 mg, Oral, HS PRN, Trish Horn MD    zolpidem (AMBIEN) tablet 10 mg, 10 mg, Oral, HS PRN, Trish Horn MD, 10 mg at 09/03/22 2818      Physical Exam:   /61 (BP Location: Left arm)   Pulse 98   Temp 99 1 °F (37 3 °C) (Oral)   Resp 18   Ht 5' 2" (1 575 m)   Wt 55 3 kg (122 lb)   SpO2 99%   BMI 22 31 kg/m²   GEN: No acute distress  RESP: breathing comfortably with no accessory muscle use  CV: no significant peripheral edema  ABD: soft, non-tender, non-distended  DRAINS:  Right nephrostomy tube with minimally pink drainage    RADIOLOGY:   Ultrasound is pending

## 2022-09-04 NOTE — UTILIZATION REVIEW
Initial Clinical Review    Admission: Date/Time/Statement:   Admission Orders (From admission, onward)     Ordered        09/03/22 1238  Place in Observation  Once                      Orders Placed This Encounter   Procedures    Place in Observation     Standing Status:   Standing     Number of Occurrences:   1     Order Specific Question:   Level of Care     Answer:   Med Surg [16]     ED Arrival Information     Expected   -    Arrival   9/3/2022 05:15    Acuity   Urgent            Means of arrival   Walk-In    Escorted by   Family Member    Service   Hospitalist    Admission type   Urgent            Arrival complaint   Urinary Retention           Chief Complaint   Patient presents with    Urinary Retention     Patient presents with urinary retention since last night  States he is only getting drops of urine out and noticing blood in urine  C/O serve pain in penis  Hx bladder cancer       Initial Presentation: 74 y o  male , presented to the ED @ [facility], from home via walk in  Admitted as Observation due to  Urinary Retention  Date: 09/03/0222    Patient was recently diagnosed with bladder cancer  Finished chemo and radiation  Last radiation was last week  He was in his usual state of health when he yesterday  Making urine  He only made a few drops and developed low abdominal pain  Obtain CT abd/pel  Obtain bladder scan  Insert flores  Consult Uro  Analgesia control PRN  09/03/2022  Consult Uro:  ASSESSMENT:  72yoM with muscle invasive bladder cancer and RIGHT ureteral obstruction/PCN being treated with chemo/radiation presenting with dysuria and incomplete emptying  PLAN:  The emergency team attempted flores after residual value this AM between 166-200  This was unsuccessful  The patient then underwent CT scan which did demonstrate bladder fullness, RIGHT PCN in place without hydro, LEFT mild hydro to LEFT UVJ     After scan, patient reports he was able to void a copious amount and feels much improved  Performed bladder scan after the void, 35cc noted  At this time, would not recommend further attempt at flores placement given patient's radiation history  Can follow serial PVRs  Day 2: 09/04/2022 09/04/2022  Progress Note Uro:  Despite initial dysuria and difficulty emptying, the patient has had bedside postvoid residuals last at 10:00 a m  Last evening that are less than 50 cc  There is urine at the bedside that is clear yellow  Patient feels improved  Formal renal bladder ultrasound will be performed today primarily to assess the left hydronephrosis along with ureteral jet and a formal postvoid residual    At this point time, the patient does not require urethral Flores catheter placement  Urine cultures pending to assess for infection  On empiric Rocephin          ED Triage Vitals [09/03/22 0602]   Temperature Pulse Respirations Blood Pressure SpO2   (!) 97 3 °F (36 3 °C) (!) 109 18 (!) 225/118 100 %      Temp Source Heart Rate Source Patient Position - Orthostatic VS BP Location FiO2 (%)   Oral Monitor Lying Right arm --      Pain Score       10 - Worst Possible Pain          Wt Readings from Last 1 Encounters:   09/03/22 55 3 kg (122 lb)     Additional Vital Signs:   Date/Time Temp Pulse Resp BP MAP (mmHg) SpO2 O2 Device Patient Position - Orthostatic VS   09/04/22 0816 99 1 °F (37 3 °C) 98 18 110/61 85 99 % None (Room air) Lying   09/04/22 0230 100 1 °F (37 8 °C) -- -- -- -- -- -- --   09/04/22 0100 100 4 °F (38 °C) -- -- -- -- -- -- --   09/03/22 2300 100 °F (37 8 °C) 86 18 112/59 79 98 % None (Room air) Lying   09/03/22 1620 99 3 °F (37 4 °C) 88 17 101/55 72 -- -- Lying   09/03/22 1430 -- 88 18 142/61 88 98 % None (Room air) Lying   09/03/22 1400 -- 94 18 139/69 97 98 % None (Room air) Lying   09/03/22 1330 -- 82 20 112/56 78 98 % None (Room air) Lying   09/03/22 1300 -- 90 20 93/50 66 96 % None (Room air) Lying   09/03/22 1215 -- 86 20 106/56 72 96 % None (Room air) Lying 09/03/22 1000 -- 118 Abnormal  18 161/96 123 97 % None (Room air) Lying   09/03/22 0900 -- 116 Abnormal  18 202/86 Abnormal  123 98 % None (Room air) Lying   09/03/22 0700 -- 96 18 191/146 Abnormal  161 99 % None (Room air) Lying           Pertinent Labs/Diagnostic Test Results:   CT abdomen pelvis wo contrast   Final Result by Vicki Yarbrough MD (09/03 1338)      Unchanged appearance of right percutaneous nephrostomy catheter within the collecting system with atrophic right kidney  A bubble of gas within the right renal collecting system is reidentified  Bladder distention, bladder wall thickening, and perivesical inflammatory stranding suggests cystitis  Asymmetric thickening in the posterior right urinary bladder is similar when compared to June 9, 2022 and on this noncontrast examination, plaque like    bladder neoplasm cannot be excluded with confidence  Mild fullness of left renal collecting system and ureter probably related to bladder distention but without left-sided urinary tract calculi evident        US kidney and bladder with pvr    (Results Pending)     Results from last 7 days   Lab Units 09/04/22  0559 09/03/22  0846   WBC Thousand/uL 6 36 8 37   HEMOGLOBIN g/dL 7 6* 9 1*   HEMATOCRIT % 25 4* 29 9*   PLATELETS Thousands/uL 75* 109*   NEUTROS ABS Thousands/µL 4 99 7 25     Results from last 7 days   Lab Units 09/04/22  0559 09/03/22  0846   SODIUM mmol/L 136 137   POTASSIUM mmol/L 4 8 4 9   CHLORIDE mmol/L 108 110*   CO2 mmol/L 22 24   ANION GAP mmol/L 6 3*   BUN mg/dL 49* 48*   CREATININE mg/dL 2 95* 2 88*   EGFR ml/min/1 73sq m 19 20   CALCIUM mg/dL 8 0* 8 7   MAGNESIUM mg/dL 1 5*  --      Results from last 7 days   Lab Units 09/04/22  0618 09/03/22  2128 09/03/22  1755 09/03/22  1528 09/03/22  1215   POC GLUCOSE mg/dl 147* 169* 193* 124 134     Results from last 7 days   Lab Units 09/04/22  0559 09/03/22  0846   GLUCOSE RANDOM mg/dL 140 179*       Results from last 7 days   Lab Units 09/03/22 2008   CLARITY UA  Extra Turbid   COLOR UA  Light Orange   SPEC GRAV UA  1 012   PH UA  6 0   GLUCOSE UA mg/dl Negative   KETONES UA mg/dl Negative   BLOOD UA  Large*   PROTEIN UA mg/dl 100 (2+)*   NITRITE UA  Negative   BILIRUBIN UA  Negative   UROBILINOGEN UA (BE) mg/dl <2 0   LEUKOCYTES UA  Large*   WBC UA /hpf Innumerable*   RBC UA /hpf Innumerable*   BACTERIA UA /hpf Moderate*   EPITHELIAL CELLS WET PREP /hpf None Seen     ED Treatment:   Medication Administration from 09/03/2022 0515 to 09/03/2022 1600       Date/Time Order Dose Route Action     09/03/2022 0846 acetaminophen (TYLENOL) tablet 650 mg 650 mg Oral Given     09/03/2022 0842 HYDROmorphone (DILAUDID) injection 0 5 mg 0 5 mg Intravenous Given     09/03/2022 1201 pregabalin (LYRICA) capsule 50 mg 50 mg Oral Given     09/03/2022 1218 sodium chloride (PF) 0 9 % injection 10 mL 10 mL Intracatheter Given     09/03/2022 1525 oxyCODONE (ROXICODONE) IR tablet 5 mg 5 mg Oral Given     09/03/2022 1201 HYDROmorphone HCl (DILAUDID) injection 0 2 mg 0 2 mg Intravenous Given        Past Medical History:   Diagnosis Date    Alcoholism ([facility] )     Cerebral artery aneurysm     Change in mental state     last assessed 5/18/15; resolved 10/27/15    Diabetes mellitus ([facility] )     Drug dependence ([facility] )     Fatigue     last assessed 1/26/15; resolved 5/24/16    Hepatitis [facility] discharge follow-up 12/21/2018    Kidney disease     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[facility] )     Liver transplant recipient [facility])     Renal disorder     Subdural hygroma     2/27/14; resolved 7/28/15    Thrombocytopenia ([facility] ) 9/20/2017     Present on Admission:   Bladder tumor   CKD (chronic kidney disease)   Hydronephrosis of right kidney      Admitting Diagnosis: Urinary retention [R33 9]  Intractable pain [R52]  Age/Sex: 74 y o  male  Admission Orders:  Oliver/CHO diet  Ambulate TID  Bladder scan q8h  Obtain US Kid/Blad  Enrique SCDs      Scheduled Medications:  cefTRIAXone, 1,000 mg, Intravenous, Q24H  clopidogrel, 75 mg, Oral, Daily  heparin (porcine), 5,000 Units, Subcutaneous, Q8H JUNIOR  insulin detemir, 10 Units, Subcutaneous, HS  insulin lispro, 1-5 Units, Subcutaneous, TID AC  pregabalin, 50 mg, Oral, TID  senna-docusate sodium, 1 tablet, Oral, BID  sodium chloride (PF), 10 mL, Intracatheter, Daily  tacrolimus, 1 mg, Oral, Q12H  tamsulosin, 0 4 mg, Oral, Daily With Dinner      Continuous IV Infusions:     PRN Meds:  acetaminophen, 488 mg, Oral, Q6H PRN  HYDROmorphone, 0 2 mg, Intravenous, Q4H PRN  ondansetron, 4 mg, Intravenous, Q6H PRN  oxyCODONE, 2 5 mg, Oral, Q6H PRN  oxyCODONE, 5 mg, Oral, Q6H PRN  polyethylene glycol, 17 g, Oral, Daily PRN  temazepam, 30 mg, Oral, HS PRN  zolpidem, 10 mg, Oral, HS PRN            Network Utilization Review Department  ATTENTION: Please call with any questions or concerns to [phone] and carefully listen to the prompts so that you are directed to the right person  All voicemails are confidential   Malaika Bolaños all requests for admission clinical reviews, approved or denied determinations and any other requests to dedicated fax number below belonging to the campus where the patient is receiving treatment  List of dedicated fax numbers for the Facilities:  [address] DENIALS (Administrative/Medical Necessity) [phone]   [address] (Maternity/NICU/Pediatrics) [phone]   [address] [place] [address]  [phone]   [address] [facility] [place] [address] [id number] [address] [phone]   [address] [place] [address]   [address] [id number] [phone]

## 2022-09-04 NOTE — PROGRESS NOTES
[facility]  Progress Note - Sindy Sage 1950, 74 y o  male MRN: [id number]  Unit/Bed#: [facility] 324-01 Encounter: [id number]  Primary Care Provider: Garth Moraes DO   Date and time admitted to hospital: 9/3/2022  5:54 AM    * Urinary retention  Assessment & Plan  · Likely secondary to acute cystitis  · Pt with hx of bladder cancer undergoing chemo/RT  · S/p CT abdomen pelvis bladder wall thickening  · S/p bladder scan w/o significant PVR  · Appreciate input per urology  · On IV CTX, f/u on urine cx  · + R PCN in place, no RT hydronephrosis  · + mild L hydroureteronephrosis  Pending US  · Cont serial bladder scan  PVR has remained < 100cc   No indication for flores catheter    Hydronephrosis of right kidney  Assessment & Plan  · Hx of Right-sided hydroureteronephrosis, status post right-sided nephrostomy tube in May 2022  · S/p present CT a/p no r hydronephrosis    Bladder tumor  Assessment & Plan  · Patient was recently diagnosed with bladder cancer  · Status post chemo and radiation, last session of radiation last week  · Follows with Hematology/oncology and palliative    CKD (chronic kidney disease)  Assessment & Plan  Lab Results   Component Value Date    EGFR 19 09/04/2022    EGFR 20 09/03/2022    EGFR 21 08/19/2022    CREATININE 2 95 (H) 09/04/2022    CREATININE 2 88 (H) 09/03/2022    CREATININE 2 75 (H) 08/19/2022   · CKD stage 4  · Creatinine at baseline  · Cont to monitor BMP    Anemia  Assessment & Plan  · Likely due to anemia of chronic disease; hx of bladder ca s/p chemo  · No sign of acute blood loss  · Cont to monitor    History of cirrhosis  Assessment & Plan  · History of liver cirrhosis, history of liver transplant at [facility]  · Continue tacrolimus 1 mg q 12 hours    Controlled diabetes mellitus with diabetic neuropathy, with long-term current use of insulin [facility])  Assessment & Plan  Lab Results   Component Value Date    HGBA1C 6 8 (H) 08/19/2022       Recent Labs 228 22  0618 22  1044 22  1620   POCGLU 169* 147* 131 230*       Blood Sugar Average: Last 72 hrs:  · (P) 161 [id number] home regimen: on Levemir 18 units at bedtime and sliding scale  · Per wife with 18 units of Levemir his blood sugar drops in the morning occasionally  · While in hospital start Levemir 10 units at bedtime and sliding scale  · Diabetic diet    History of CVA (cerebrovascular accident)  Assessment & Plan  · On Plavix, continue since no hematuria    VTE Pharmacologic Prophylaxis:   Pharmacologic: Heparin  Mechanical VTE Prophylaxis in Place: No    Patient Centered Rounds: I have performed bedside rounds with nursing staff today  Discussions with Specialists or Other Care Team Provider:     Education and Discussions with Family / Patient: Patient, his wife and brother    Time Spent for Care: 30 minutes  More than 50% of total time spent on counseling and coordination of care as described above  Current Length of Stay: 0 day(s)    Current Patient Status: Observation   Certification Statement: The patient will continue to require additional inpatient hospital stay due to acute illness    Discharge Plan:     Code Status: Level 1 - Full Code      Subjective:   PVR < 100  Denies abd pain, dysuria  Has been voiding on his own    Objective:     Vitals:   Temp (24hrs), Av 9 °F (37 7 °C), Min:99 1 °F (37 3 °C), Max:100 4 °F (38 °C)    Temp:  [99 1 °F (37 3 °C)-100 4 °F (38 °C)] 99 1 °F (37 3 °C)  HR:  [86-98] 98  Resp:  [18] 18  BP: (110-112)/(59-61) 110/61  SpO2:  [98 %-99 %] 99 %  Body mass index is 22 31 kg/m²  Input and Output Summary (last 24 hours):     No intake or output data in the 24 hours ending 22 1903    Physical Exam:     Physical Exam  Cardiovascular:      Rate and Rhythm: Normal rate and regular rhythm  Pulses: Normal pulses  Heart sounds: Normal heart sounds     Pulmonary:      Effort: Pulmonary effort is normal  No respiratory distress  Breath sounds: Normal breath sounds  No wheezing or rales  Abdominal:      General: Abdomen is flat  Bowel sounds are normal  There is no distension  Palpations: Abdomen is soft  Tenderness: There is no abdominal tenderness  There is no guarding  Genitourinary:     Comments: RT PCN  Musculoskeletal:         General: Normal range of motion  Cervical back: Normal range of motion and neck supple  Right lower leg: No edema  Left lower leg: No edema  Skin:     General: Skin is warm and dry  Neurological:      General: No focal deficit present  Mental Status: He is alert and oriented to person, place, and time  Mental status is at baseline  Cranial Nerves: No cranial nerve deficit  Motor: No weakness  Additional Data:     Labs:    Results from last 7 days   Lab Units 09/04/22  0559   WBC Thousand/uL 6 36   HEMOGLOBIN g/dL 7 6*   HEMATOCRIT % 25 4*   PLATELETS Thousands/uL 75*   NEUTROS PCT % 78*   LYMPHS PCT % 7*   MONOS PCT % 12   EOS PCT % 2     Results from last 7 days   Lab Units 09/04/22  0559   SODIUM mmol/L 136   POTASSIUM mmol/L 4 8   CHLORIDE mmol/L 108   CO2 mmol/L 22   BUN mg/dL 49*   CREATININE mg/dL 2 95*   ANION GAP mmol/L 6   CALCIUM mg/dL 8 0*   GLUCOSE RANDOM mg/dL 140         Results from last 7 days   Lab Units 09/04/22  1620 09/04/22  1044 09/04/22  0618 09/03/22  2128 09/03/22  1755 09/03/22  1528 09/03/22  1215   POC GLUCOSE mg/dl 230* 131 147* 169* 193* 124 134                   * I Have Reviewed All Lab Data Listed Above  * Additional Pertinent Lab Tests Reviewed:  All Labs Within Last 24 Hours Reviewed    Imaging:    Imaging Reports Reviewed Today Include:   Imaging Personally Reviewed by Myself Includes:      Recent Cultures (last 7 days):           Last 24 Hours Medication List:   Current Facility-Administered Medications   Medication Dose Route Frequency Provider Last Rate    acetaminophen  488 mg Oral Q6H PRN Burgess Haseeb MD      cefTRIAXone  1,000 mg Intravenous Q24H Gomez Vyas DO 1,000 mg (09/04/22 1123)    clopidogrel  75 mg Oral Daily Burgess Haseeb MD      heparin (porcine)  5,000 Units Subcutaneous Q8H [facility] Burgess Haseeb MD      HYDROmorphone  0 2 mg Intravenous Q4H PRN Burgess Haseeb MD      insulin detemir  12 Units Subcutaneous HS Ashley Julieta Carreon DO      insulin lispro  1-5 Units Subcutaneous TID AC Maria Eugenia Santa MD      ondansetron  4 mg Intravenous Q6H PRN Burgess Haseeb MD      oxyCODONE  2 5 mg Oral Q6H PRN Burgess Haseeb MD      oxyCODONE  5 mg Oral Q6H PRN Burgess Haseeb MD      polyethylene glycol  17 g Oral Daily PRN Burgess Haseeb MD      pregabalin  50 mg Oral TID Burgess Haseeb MD      senna-docusate sodium  1 tablet Oral BID Burgess Haseeb MD      sodium chloride (PF)  10 mL Intracatheter Daily Burgess Haseeb MD      tacrolimus  1 mg Oral Q12H Burgess Haseeb MD      tamsulosin  0 4 mg Oral Daily With Loni Bauer MD      temazepam  30 mg Oral HS PRN Burgess Haseeb MD      zolpidem  10 mg Oral HS PRN Burgess Haseeb MD          Today, Patient Was Seen By: Gomez Vyas DO    ** Please Note: Dictation voice to text software may have been used in the creation of this document   **

## 2022-09-05 VITALS
RESPIRATION RATE: 16 BRPM | OXYGEN SATURATION: 97 % | HEIGHT: 62 IN | DIASTOLIC BLOOD PRESSURE: 71 MMHG | BODY MASS INDEX: 22.45 KG/M2 | TEMPERATURE: 99 F | SYSTOLIC BLOOD PRESSURE: 129 MMHG | HEART RATE: 86 BPM | WEIGHT: 122 LBS

## 2022-09-05 PROBLEM — N30.00 ACUTE CYSTITIS: Status: ACTIVE | Noted: 2022-09-05

## 2022-09-05 PROBLEM — R33.9 URINARY RETENTION: Status: RESOLVED | Noted: 2022-09-03 | Resolved: 2022-09-05

## 2022-09-05 LAB
ANION GAP SERPL CALCULATED.3IONS-SCNC: 7 MMOL/L (ref 4–13)
BACTERIA UR CULT: ABNORMAL
BUN SERPL-MCNC: 38 MG/DL (ref 5–25)
CALCIUM SERPL-MCNC: 7.9 MG/DL (ref 8.3–10.1)
CHLORIDE SERPL-SCNC: 112 MMOL/L (ref 96–108)
CO2 SERPL-SCNC: 22 MMOL/L (ref 21–32)
CREAT SERPL-MCNC: 2.42 MG/DL (ref 0.6–1.3)
ERYTHROCYTE [DISTWIDTH] IN BLOOD BY AUTOMATED COUNT: 17.2 % (ref 11.6–15.1)
GFR SERPL CREATININE-BSD FRML MDRD: 25 ML/MIN/1.73SQ M
GLUCOSE SERPL-MCNC: 213 MG/DL (ref 65–140)
GLUCOSE SERPL-MCNC: 70 MG/DL (ref 65–140)
GLUCOSE SERPL-MCNC: 79 MG/DL (ref 65–140)
HCT VFR BLD AUTO: 26.2 % (ref 36.5–49.3)
HGB BLD-MCNC: 8 G/DL (ref 12–17)
MCH RBC QN AUTO: 24.5 PG (ref 26.8–34.3)
MCHC RBC AUTO-ENTMCNC: 30.5 G/DL (ref 31.4–37.4)
MCV RBC AUTO: 80 FL (ref 82–98)
PLATELET # BLD AUTO: 76 THOUSANDS/UL (ref 149–390)
PMV BLD AUTO: 12.2 FL (ref 8.9–12.7)
POTASSIUM SERPL-SCNC: 3.9 MMOL/L (ref 3.5–5.3)
RBC # BLD AUTO: 3.27 MILLION/UL (ref 3.88–5.62)
SODIUM SERPL-SCNC: 141 MMOL/L (ref 135–147)
WBC # BLD AUTO: 5.66 THOUSAND/UL (ref 4.31–10.16)

## 2022-09-05 PROCEDURE — 99239 HOSP IP/OBS DSCHRG MGMT >30: CPT | Performed by: INTERNAL MEDICINE

## 2022-09-05 PROCEDURE — 82948 REAGENT STRIP/BLOOD GLUCOSE: CPT

## 2022-09-05 PROCEDURE — 80048 BASIC METABOLIC PNL TOTAL CA: CPT | Performed by: INTERNAL MEDICINE

## 2022-09-05 PROCEDURE — 85027 COMPLETE CBC AUTOMATED: CPT | Performed by: INTERNAL MEDICINE

## 2022-09-05 RX ORDER — INSULIN DETEMIR 100 [IU]/ML
15 INJECTION, SOLUTION SUBCUTANEOUS
Qty: 15 ML | Refills: 0
Start: 2022-09-05 | End: 2022-09-26 | Stop reason: SDUPTHER

## 2022-09-05 RX ORDER — CEPHALEXIN 500 MG/1
500 CAPSULE ORAL EVERY 12 HOURS SCHEDULED
Qty: 8 CAPSULE | Refills: 0 | Status: SHIPPED | OUTPATIENT
Start: 2022-09-06 | End: 2022-09-12

## 2022-09-05 RX ADMIN — TAMSULOSIN HYDROCHLORIDE 0.4 MG: 0.4 CAPSULE ORAL at 15:54

## 2022-09-05 RX ADMIN — CLOPIDOGREL BISULFATE 75 MG: 75 TABLET ORAL at 08:53

## 2022-09-05 RX ADMIN — PREGABALIN 50 MG: 50 CAPSULE ORAL at 08:52

## 2022-09-05 RX ADMIN — TACROLIMUS 1 MG: 1 CAPSULE ORAL at 08:54

## 2022-09-05 RX ADMIN — HEPARIN SODIUM 5000 UNITS: 5000 INJECTION INTRAVENOUS; SUBCUTANEOUS at 05:56

## 2022-09-05 RX ADMIN — INSULIN LISPRO 2 UNITS: 100 INJECTION, SOLUTION INTRAVENOUS; SUBCUTANEOUS at 12:35

## 2022-09-05 RX ADMIN — SODIUM CHLORIDE 10 ML: 9 INJECTION, SOLUTION INTRAMUSCULAR; INTRAVENOUS; SUBCUTANEOUS at 08:51

## 2022-09-05 RX ADMIN — CEFTRIAXONE SODIUM 1000 MG: 10 INJECTION, POWDER, FOR SOLUTION INTRAVENOUS at 11:16

## 2022-09-05 RX ADMIN — PREGABALIN 50 MG: 50 CAPSULE ORAL at 15:54

## 2022-09-05 NOTE — PLAN OF CARE
Problem: Potential for Falls  Goal: Patient will remain free of falls  Description: INTERVENTIONS:  - Educate patient/family on patient safety including physical limitations  - Instruct patient to call for assistance with activity   - Consult OT/PT to assist with strengthening/mobility   - Keep Call bell within reach  - Keep bed low and locked with side rails adjusted as appropriate  - Keep care items and personal belongings within reach  - Initiate and maintain comfort rounds  - Make Fall Risk Sign visible to staff  - Offer Toileting every  Hours, in advance of need  - Initiate/Maintain alarm  - Obtain necessary fall risk management equipment:   - Apply yellow socks and bracelet for high fall risk patients  - Consider moving patient to room near nurses station  Outcome: Progressing     Problem: PAIN - ADULT  Goal: Verbalizes/displays adequate comfort level or baseline comfort level  Description: Interventions:  - Encourage patient to monitor pain and request assistance  - Assess pain using appropriate pain scale  - Administer analgesics based on type and severity of pain and evaluate response  - Implement non-pharmacological measures as appropriate and evaluate response  - Consider cultural and social influences on pain and pain management  - Notify physician/advanced practitioner if interventions unsuccessful or patient reports new pain  Outcome: Progressing     Problem: SAFETY ADULT  Goal: Patient will remain free of falls  Description: INTERVENTIONS:  - Educate patient/family on patient safety including physical limitations  - Instruct patient to call for assistance with activity   - Consult OT/PT to assist with strengthening/mobility   - Keep Call bell within reach  - Keep bed low and locked with side rails adjusted as appropriate  - Keep care items and personal belongings within reach  - Initiate and maintain comfort rounds  - Make Fall Risk Sign visible to staff  - Offer Toileting every Hours, in advance of need  - Initiate/Maintain alarm  - Obtain necessary fall risk management equipment:   - Apply yellow socks and bracelet for high fall risk patients  - Consider moving patient to room near nurses station  Outcome: Progressing

## 2022-09-05 NOTE — QUICK NOTE
**Late entry**    Call received from MAGALI Mcghee regarding care coordination of ELLIOTT for patient. Per MAGALI Mcghee and MAGALI Wesley, due to clinic schedule availability, ok to schedule patient in 20 min appointment 1/13 at 2:40 prior to her appointment in [facility]. Plan to finish ELLIOTT on following Wednesday 1/20. Pt also needs 1 hour GTT.     Called and spoke with patient, she states the earliest she can come to clinic is 1:30. Advised her to come to clinic to drink glucose and have her lab drawn prior to appointment. Advised her if she was late, may not be able to complete until after M appointment or another day. She verbalized understanding. Placed on nurse schedule.    Patient seen and examined  Feeling well  Urine culture preliminary positive  Likely source of symptoms  /71 (BP Location: Left arm)   Pulse 86   Temp 99 °F (37 2 °C) (Oral)   Resp 16   Ht 5' 2" (1 575 m)   Wt 55 3 kg (122 lb)   SpO2 97%   BMI 22 31 kg/m²      Lab Results   Component Value Date    SODIUM 141 09/05/2022    K 3 9 09/05/2022     (H) 09/05/2022    CO2 22 09/05/2022    BUN 38 (H) 09/05/2022    CREATININE 2 42 (H) 09/05/2022    GLUC 70 09/05/2022    CALCIUM 7 9 (L) 09/05/2022     (Stable CKD)    Lab Results   Component Value Date    WBC 5 66 09/05/2022    HGB 8 0 (L) 09/05/2022    HCT 26 2 (L) 09/05/2022    MCV 80 (L) 09/05/2022    PLT 76 (L) 09/05/2022     9/3/22  Urine Culture >100,000 cfu/ml Gram Negative Trey Enteric Like Abnormal        10,000-19,000 cfu/ml Gram Negative Trey Enteric Like Abnormal            Yesterday's US reviewed:  RENAL ULTRASOUND WITH PVR     INDICATION:   LEFT hydro, rule out retention      COMPARISON: Multiple priors most recently CT 9/3/2022     TECHNIQUE:   Ultrasound of the retroperitoneum was performed with a curvilinear transducer utilizing volumetric sweeps and still imaging techniques       FINDINGS:     KIDNEYS:  Symmetric and normal size  Right kidney:  8 1 x 6 2 x 5 1 cm  Volume 132 6 mL  Left kidney:  9 3 x 6 7 x 4 7 cm  Volume 154 8 mL     Right kidney  Normal echogenicity and contour  No mass is identified  Percutaneous nephrostomy tube   No hydronephrosis  No shadowing calculi  No perinephric fluid collections      Left kidney  Normal echogenicity and contour  No mass is identified  No hydronephrosis  No shadowing calculi  No perinephric fluid collections      URETERS:  Nonvisualized      BLADDER:   Normally distended  No focal thickening or mass lesions  Small diverticulum  Left ureteral jet detected  Patient has right-sided percutaneous nephrostomy tube  Prevoid: 69 9   No significant post void volume   Measured post void volume in mL: 4 8        IMPRESSION:     No hydronephrosis  No significant post void residual volume  PLAN: Await final culture data and tailor antibiotics appropriately  No need for flores of LEFT collecting system drainage  RIGHT PCN to gravity  Outpatient followup for surveillance of bladder cancer  No inpatient urologic needs at this time, please call with questions

## 2022-09-05 NOTE — ASSESSMENT & PLAN NOTE
· [facility] of liver cirrhosis[facility]  · Continue tacrolimus 1 mg q 12 hours

## 2022-09-05 NOTE — DISCHARGE SUMMARY
[address] [facility]  Discharge- Renee Naranjo 1950, 74 y o  male MRN: [id number]  Unit/Bed#: [facility] 324-01 Encounter: [id number]  Primary Care Provider: Alexander Iniguez DO   Date and time admitted to hospital: 9/3/2022  5:54 AM    * Urinary retention  Assessment & Plan  · Likely secondary to acute cystitis  · Now resolved, s/p serial bladder scan PVR < 100  · Pt with hx of bladder cancer undergoing chemo/RT  · S/p CT abdomen pelvis bladder wall thickening  · S/p bladder scan w/o significant PVR  · Urology on board   · + R PCN in place, no RT hydronephrosis  · + mild L hydroureteronephrosis  S/p renal US no hydronephrosis  Per urology no need lfores needed  · No further  intervention      Hydronephrosis of right kidney  Assessment & Plan  · Hx of Right-sided hydroureteronephrosis, status post right-sided nephrostomy tube in May 2022  · S/p present CT a/p no r hydronephrosis    Acute cystitis  Assessment & Plan  · Met SIRs criteria with fever, tachycardia thus sepsis  · S/p urine cx revealing ecoli sens to ancef  · Has been on IV CTX    · Will dc on renally dosed keflex to complete for a total of 5 days  · Fever curve trended down  · Pt clinically stable    CKD (chronic kidney disease)  Assessment & Plan  Lab Results   Component Value Date    EGFR 25 09/05/2022    EGFR 19 09/04/2022    EGFR 20 09/03/2022    CREATININE 2 42 (H) 09/05/2022    CREATININE 2 95 (H) 09/04/2022    CREATININE 2 88 (H) 09/03/2022   · CKD stage 4  · Creatinine at baseline    Bladder tumor  Assessment & Plan  · Patient was recently diagnosed with bladder cancer  · Status post chemo and radiation, last session of radiation last week  · Follows with Hematology/oncology and palliative    Anemia  Assessment & Plan  · Likely due to anemia of chronic disease; hx of bladder ca s/p chemo  · No sign of acute blood loss  · Remains stable    History of cirrhosis  Assessment & Plan  · History of liver cirrhosis, s/p liver transplant at [facility]  · Continue tacrolimus 1 mg q 12 hours    Controlled diabetes mellitus with diabetic neuropathy, with long-term current use of insulin [facility])  Assessment & Plan  Lab Results   Component Value Date    HGBA1C 6 8 (H) 08/19/2022       Recent Labs     09/04/22  1620 09/04/22  2050 09/05/22  0647 09/05/22  1125   POCGLU 230* 84 79 213*       Blood Sugar Average: Last 72 hrs:  · (P) 150 4   · Wife had reported of at times am hypoglycemia thus will lower dose of levimer    History of CVA (cerebrovascular accident)  Assessment & Plan  · On Plavix      Discharging Physician / Practitioner: Bela Smith DO  PCP: Navid Centeno DO  Admission Date:   Admission Orders (From admission, onward)     Ordered        09/04/22 1903  Inpatient Admission  Once            09/03/22 1238  Place in Observation  Once                      Discharge Date: 09/05/22    Medical Problems             Resolved Problems  Date Reviewed: 9/5/2022   None                 Consultations During Hospital Stay:  Urology    Procedures Performed:   · CT a/p  · Renal US    Significant Findings / Test Results:   · See above    Incidental Findings:   ·     Test Results Pending at Discharge (will require follow up):   ·      Outpatient Tests Requested:  ·     Complications:  none    Reason for Admission: Urinary retention    Hospital Course:     HPI: Elzie Buerger is a 74 y o  male with a PMH of alcoholic cirrhosis status post liver transplant in 2003, insulin-dependent type 2 diabetes, neuropathy, CKD stage 4 secondary to tacrolimus, history of stroke, bladder cancer who presents with urinary retention  Patient was recently diagnosed with bladder cancer  Finished chemo and radiation  Last radiation was last week  He was in his usual state of health when he yesterday  Making urine  He only made a few drops and developed low abdominal pain  Denies hematuria      Please see above list of diagnoses and related plan for additional information  Condition at Discharge: stable     Discharge Day Visit / Exam:     Subjective:  Feels well  Was looking forward to going home  Afebrile, non toxic appearing  S/p bladder scanning PVR remains < 100  Denies abd pain, dysuria  Vitals: Blood Pressure: 129/71 (09/05/22 0700)  Pulse: 86 (09/05/22 0700)  Temperature: 99 °F (37 2 °C) (09/05/22 0700)  Temp Source: Oral (09/05/22 0700)  Respirations: 16 (09/05/22 0700)  Height: 5' 2" (157 5 cm) (09/03/22 1620)  Weight - Scale: 55 3 kg (122 lb) (09/03/22 1620)  SpO2: 97 % (09/05/22 0700)  Exam:   Physical Exam  Cardiovascular:      Rate and Rhythm: Normal rate and regular rhythm  Pulses: Normal pulses  Heart sounds: Normal heart sounds  Pulmonary:      Effort: Pulmonary effort is normal  No respiratory distress  Breath sounds: Normal breath sounds  No wheezing or rales  Abdominal:      General: Abdomen is flat  Bowel sounds are normal  There is no distension  Palpations: Abdomen is soft  Tenderness: There is no abdominal tenderness  There is no guarding  Genitourinary:     Comments: RT nephrostomy tube  Musculoskeletal:         General: Normal range of motion  Cervical back: Normal range of motion and neck supple  Right lower leg: No edema  Left lower leg: No edema  Skin:     General: Skin is warm and dry  Neurological:      General: No focal deficit present  Mental Status: He is alert and oriented to person, place, and time  Mental status is at baseline  Cranial Nerves: No cranial nerve deficit  Motor: No weakness  Discussion with Family: D/w patient and his sister    Discharge instructions/Information to patient and family:   See after visit summary for information provided to patient and family  Provisions for Follow-Up Care:  See after visit summary for information related to follow-up care and any pertinent home health orders        Disposition:     Home    For Discharges to [facility]:   · Not Applicable to this Patient - Not Applicable to this Patient    Planned Readmission: No     Discharge Statement:  I spent 35 minutes discharging the patient  This time was spent on the day of discharge  I had direct contact with the patient on the day of discharge  Greater than 50% of the total time was spent examining patient, answering all patient questions, arranging and discussing plan of care with patient as well as directly providing post-discharge instructions  Additional time then spent on discharge activities  Discharge Medications:  See after visit summary for reconciled discharge medications provided to patient and family        ** Please Note: This note has been constructed using a voice recognition system **

## 2022-09-05 NOTE — ASSESSMENT & PLAN NOTE
Lab Results   Component Value Date    EGFR 25 09/05/2022    EGFR 19 09/04/2022    EGFR 20 09/03/2022    CREATININE 2 42 (H) 09/05/2022    CREATININE 2 95 (H) 09/04/2022    CREATININE 2 88 (H) 09/03/2022   · CKD stage 4  · Creatinine at baseline

## 2022-09-05 NOTE — ASSESSMENT & PLAN NOTE
· Likely due to anemia of chronic disease; hx of bladder ca s/p chemo  · No sign of acute blood loss  · Remains stable

## 2022-09-05 NOTE — ASSESSMENT & PLAN NOTE
· Likely secondary to acute cystitis  · Now resolved, s/p serial bladder scan PVR < 100  · Pt with hx of bladder cancer undergoing chemo/RT  · S/p CT abdomen pelvis bladder wall thickening  · S/p bladder scan w/o significant PVR  · Urology on board   · + R PCN in place, no RT hydronephrosis  · + mild L hydroureteronephrosis  S/p renal US no hydronephrosis   Per urology no need flores needed  · No further  intervention

## 2022-09-05 NOTE — PLAN OF CARE
Problem: Potential for Falls  Goal: Patient will remain free of falls  Description: INTERVENTIONS:  - Educate patient/family on patient safety including physical limitations  - Instruct patient to call for assistance with activity   - Consult OT/PT to assist with strengthening/mobility   - Keep Call bell within reach  - Keep bed low and locked with side rails adjusted as appropriate  - Keep care items and personal belongings within reach  - Initiate and maintain comfort rounds  - Make Fall Risk Sign visible to staff  - Offer Toileting every Hours, in advance of need  - Initiate/Maintain alarm  - Obtain necessary fall risk management equipment:  - Apply yellow socks and bracelet for high fall risk patients  - Consider moving patient to room near nurses station  Outcome: Adequate for Discharge     Problem: PAIN - ADULT  Goal: Verbalizes/displays adequate comfort level or baseline comfort level  Description: Interventions:  - Encourage patient to monitor pain and request assistance  - Assess pain using appropriate pain scale  - Administer analgesics based on type and severity of pain and evaluate response  - Implement non-pharmacological measures as appropriate and evaluate response  - Consider cultural and social influences on pain and pain management  - Notify physician/advanced practitioner if interventions unsuccessful or patient reports new pain  Outcome: Adequate for Discharge     Problem: INFECTION - ADULT  Goal: Absence or prevention of progression during hospitalization  Description: INTERVENTIONS:  - Assess and monitor for signs and symptoms of infection  - Monitor lab/diagnostic results  - Monitor all insertion sites, i e  indwelling lines, tubes, and drains  - Monitor endotracheal if appropriate and nasal secretions for changes in amount and color  - [place] appropriate cooling/warming therapies per order  - Administer medications as ordered  - Instruct and encourage patient and family to use good hand hygiene technique  - Identify and instruct in appropriate isolation precautions for identified infection/condition  Outcome: Adequate for Discharge  Goal: Absence of fever/infection during neutropenic period  Description: INTERVENTIONS:  - Monitor WBC    Outcome: Adequate for Discharge     Problem: SAFETY ADULT  Goal: Patient will remain free of falls  Description: INTERVENTIONS:  - Educate patient/family on patient safety including physical limitations  - Instruct patient to call for assistance with activity   - Consult OT/PT to assist with strengthening/mobility   - Keep Call bell within reach  - Keep bed low and locked with side rails adjusted as appropriate  - Keep care items and personal belongings within reach  - Initiate and maintain comfort rounds  - Make Fall Risk Sign visible to staff  - Offer Toileting every Hours, in advance of need  - Initiate/Maintain alarm  - Obtain necessary fall risk management equipment:   - Apply yellow socks and bracelet for high fall risk patients  - Consider moving patient to room near nurses station  Outcome: Adequate for Discharge  Goal: Maintain or return to baseline ADL function  Description: INTERVENTIONS:  -  Assess patient's ability to carry out ADLs; assess patient's baseline for ADL function and identify physical deficits which impact ability to perform ADLs (bathing, care of mouth/teeth, toileting, grooming, dressing, etc )  - Assess/evaluate cause of self-care deficits   - Assess range of motion  - Assess patient's mobility; develop plan if impaired  - Assess patient's need for assistive devices and provide as appropriate  - Encourage maximum independence but intervene and supervise when necessary  - Involve family in performance of ADLs  - Assess for home care needs following discharge   - Consider OT consult to assist with ADL evaluation and planning for discharge  - Provide patient education as appropriate  Outcome: Adequate for Discharge  Goal: Maintains/Returns to pre admission functional level  Description: INTERVENTIONS:  - Perform BMAT or MOVE assessment daily    - Set and communicate daily mobility goal to care team and patient/family/caregiver  - Collaborate with rehabilitation services on mobility goals if consulted  - Perform Range of Motion  times a day  - Reposition patient every hours  - Dangle patient times a day  - Stand patient times a day  - Ambulate patient  times a day  - Out of bed to chair  times a day   - Out of bed for meals  times a day  - Out of bed for toileting  - Record patient progress and toleration of activity level   Outcome: Adequate for Discharge     Problem: DISCHARGE PLANNING  Goal: Discharge to home or other facility with appropriate resources  Description: INTERVENTIONS:  - Identify barriers to discharge w/patient and caregiver  - Arrange for needed discharge resources and transportation as appropriate  - Identify discharge learning needs (meds, wound care, etc )  - Arrange for interpretive services to assist at discharge as needed  - Refer to Case Management Department for coordinating discharge planning if the patient needs post-hospital services based on physician/advanced practitioner order or complex needs related to functional status, cognitive ability, or social support system  Outcome: Adequate for Discharge     Problem: Knowledge Deficit  Goal: Patient/family/caregiver demonstrates understanding of disease process, treatment plan, medications, and discharge instructions  Description: Complete learning assessment and assess knowledge base    Interventions:  - Provide teaching at level of understanding  - Provide teaching via preferred learning methods  Outcome: Adequate for Discharge     Problem: MOBILITY - ADULT  Goal: Maintain or return to baseline ADL function  Description: INTERVENTIONS:  -  Assess patient's ability to carry out ADLs; assess patient's baseline for ADL function and identify physical deficits which impact ability to perform ADLs (bathing, care of mouth/teeth, toileting, grooming, dressing, etc )  - Assess/evaluate cause of self-care deficits   - Assess range of motion  - Assess patient's mobility; develop plan if impaired  - Assess patient's need for assistive devices and provide as appropriate  - Encourage maximum independence but intervene and supervise when necessary  - Involve family in performance of ADLs  - Assess for home care needs following discharge   - Consider OT consult to assist with ADL evaluation and planning for discharge  - Provide patient education as appropriate  Outcome: Adequate for Discharge  Goal: Maintains/Returns to pre admission functional level  Description: INTERVENTIONS:  - Perform BMAT or MOVE assessment daily    - Set and communicate daily mobility goal to care team and patient/family/caregiver  - Collaborate with rehabilitation services on mobility goals if consulted  - Perform Range of Motion times a day  - Reposition patient every hours    - Dangle patient  times a day  - Stand patient times a day  - Ambulate patient times a day  - Out of bed to chair times a day   - Out of bed for meals times a day  - Out of bed for toileting  - Record patient progress and toleration of activity level   Outcome: Adequate for Discharge

## 2022-09-05 NOTE — ASSESSMENT & PLAN NOTE
· Met SIRs criteria with fever, tachycardia thus sepsis  · S/p urine cx revealing GNR 100K  · Has been on IV CTX    · Previous urine cx of ecoli sens to ancef  · Will dc on renally dose keflex to complete for a total of 5 days  · Fever curve trended down  · Pt clinically stable

## 2022-09-05 NOTE — ASSESSMENT & PLAN NOTE
Lab Results   Component Value Date    HGBA1C 6 8 (H) 08/19/2022       Recent Labs     09/04/22  1620 09/04/22 2050 09/05/22  0647 09/05/22  1125   POCGLU 230* 84 79 213*       Blood Sugar Average: Last 72 hrs:  · (P) 150 4   · Cont insulin regimen

## 2022-09-06 ENCOUNTER — TRANSITIONAL CARE MANAGEMENT (OUTPATIENT)
Dept: FAMILY MEDICINE CLINIC | Facility: CLINIC | Age: 72
End: 2022-09-06

## 2022-09-06 NOTE — UTILIZATION REVIEW
Observation on 09/03 @ 21  upgraded to Inpatient on 09/04 @ 1903  Pt requiring continued stay for urinary retention workup  Admission Orders (From admission, onward)     Ordered        09/04/22 1903  Inpatient Admission  Once            09/03/22 1238  Place in Observation  Once                      Orders Placed This Encounter   Procedures    Inpatient Admission     Standing Status:   Standing     Number of Occurrences:   1     Order Specific Question:   Level of Care     Answer:   Med Surg [16]     Order Specific Question:   Estimated length of stay     Answer:   More than 2 Midnights     Order Specific Question:   Certification     Answer:   I certify that inpatient services are medically necessary for this patient for a duration of greater than two midnights  See H&P and MD Progress Notes for additional information about the patient's course of treatment  Please see initial review by Diana Martinez  Continued Stay Review:    Date:  09/05    Day 2 IP                HPI:72 y o  male initially admitted on 09/03    Assessment/Plan: Pt's fever curve trended down, bladder scanning PVR remains <100  Denies abd pain and dysuria  S/p renal US no hydronephrosis  Per urology no flores needed  On IV Ceftriaxone, will transition to Keflex x 5 days  Cont pain control prn  On exam, aaox3  Abdomen flat, soft, nt,nd  Vital Signs:   Date/Time Temp Pulse Resp BP MAP (mmHg) SpO2 O2 Device Patient Position - Orthostatic VS   09/05/22 0700 99 °F (37 2 °C) 86 16 129/71 93 97 % None (Room air) Lying   09/04/22 2249 99 °F (37 2 °C) 88 16 141/78 103 95 % None (Room air) Lying       Pertinent Labs/Diagnostic Results:   09/04 US Kidney and bladder: No hydronephrosis    No significant post void residual volume           Results from last 7 days   Lab Units 09/05/22  0552 09/04/22  0559 09/03/22  0846   WBC Thousand/uL 5 66 6 36 8 37   HEMOGLOBIN g/dL 8 0* 7 6* 9 1*   HEMATOCRIT % 26 2* 25 4* 29 9*   PLATELETS Thousands/uL 76* 75* 109*   NEUTROS ABS Thousands/µL  --  4 99 7 25         Results from last 7 days   Lab Units 09/05/22  0552 09/04/22  0559 09/03/22  0846   SODIUM mmol/L 141 136 137   POTASSIUM mmol/L 3 9 4 8 4 9   CHLORIDE mmol/L 112* 108 110*   CO2 mmol/L 22 22 24   ANION GAP mmol/L 7 6 3*   BUN mg/dL 38* 49* 48*   CREATININE mg/dL 2 42* 2 95* 2 88*   EGFR ml/min/1 73sq m 25 19 20   CALCIUM mg/dL 7 9* 8 0* 8 7   MAGNESIUM mg/dL  --  1 5*  --          Results from last 7 days   Lab Units 09/05/22  1125 09/05/22  0647 09/04/22  2050 09/04/22  1620 09/04/22  1044 09/04/22  0618 09/03/22  2128 09/03/22  1755 09/03/22  1528 09/03/22  1215   POC GLUCOSE mg/dl 213* 79 84 230* 131 147* 169* 193* 124 134     Results from last 7 days   Lab Units 09/05/22  0552 09/04/22  0559 09/03/22  0846   GLUCOSE RANDOM mg/dL 70 140 179*       Results from last 7 days   Lab Units 09/03/22 2008   CLARITY UA  Extra Turbid   COLOR UA  Light Orange   SPEC GRAV UA  1 012   PH UA  6 0   GLUCOSE UA mg/dl Negative   KETONES UA mg/dl Negative   BLOOD UA  Large*   PROTEIN UA mg/dl 100 (2+)*   NITRITE UA  Negative   BILIRUBIN UA  Negative   UROBILINOGEN UA (BE) mg/dl <2 0   LEUKOCYTES UA  Large*   WBC UA /hpf Innumerable*   RBC UA /hpf Innumerable*   BACTERIA UA /hpf Moderate*   EPITHELIAL CELLS WET PREP /hpf None Seen       Results from last 7 days   Lab Units 09/03/22 2008   URINE CULTURE  >100,000 cfu/ml Escherichia coli*     Medications:   Scheduled Medications:  cefTRIAXone, 1,000 mg, Intravenous, Q24H  clopidogrel, 75 mg, Oral, Daily  heparin (porcine), 5,000 Units, Subcutaneous, Q8H JUNIOR  insulin detemir, 10 Units, Subcutaneous, HS  insulin lispro, 1-5 Units, Subcutaneous, TID AC  pregabalin, 50 mg, Oral, TID  senna-docusate sodium, 1 tablet, Oral, BID  sodium chloride (PF), 10 mL, Intracatheter, Daily  tacrolimus, 1 mg, Oral, Q12H  tamsulosin, 0 4 mg, Oral, Daily With Dinner    Continuous IV Infusions:  PRN Meds:  lidocaine, 1 application, Urethral, Daily PRN  acetaminophen, 488 mg, Oral, Q6H PRN 09/04 x 1  HYDROmorphone, 0 2 mg, Intravenous, Q4H PRN 09/03 x 1, 09/04 x 1  ondansetron, 4 mg, Intravenous, Q6H PRN  oxyCODONE, 2 5 mg, Oral, Q6H PRN  oxyCODONE, 5 mg, Oral, Q6H PRN 09/03 x 1, 09/04 x 1  polyethylene glycol, 17 g, Oral, Daily PRN  temazepam, 30 mg, Oral, HS PRN  zolpidem, 10 mg, Oral, HS PRN 09/03 x 1, 09/04 x 1      Discharge Plan: Home    Network Utilization Review Department  ATTENTION: Please call with any questions or concerns to [phone] and carefully listen to the prompts so that you are directed to the right person  All voicemails are confidential   Terrea Cobia all requests for admission clinical reviews, approved or denied determinations and any other requests to dedicated fax number below belonging to the campus where the patient is receiving treatment   List of dedicated fax numbers for the Facilities:  [address] DENIALS (Administrative/Medical Necessity) [phone]   [address] (Maternity/NICU/Pediatrics) [phone]   [address]  [phone] [address] [place] [address] [address] [place] [address] [phone]

## 2022-09-07 ENCOUNTER — RA CDI HCC (OUTPATIENT)
Dept: OTHER | Facility: HOSPITAL | Age: 72
End: 2022-09-07

## 2022-09-07 ENCOUNTER — TELEPHONE (OUTPATIENT)
Dept: NEPHROLOGY | Facility: CLINIC | Age: 72
End: 2022-09-07

## 2022-09-07 NOTE — PROGRESS NOTES
[facility]  coding opportunities       Chart reviewed, no opportunity found:   [address]        Patients Insurance     Medicare Insurance: [employer] Advantage

## 2022-09-07 NOTE — UTILIZATION REVIEW
Notification of Discharge   This is a Notification of Discharge from our facility [address]  Please be advised that this patient has been discharge from our facility  Below you will find the admission and discharge date and time including the patients disposition  UTILIZATION REVIEW CONTACT:  Amanda Jose  Utilization   Network Utilization Review Department  Phone: [phone] x carefully listen to the prompts  All voicemails are confidential   Email: [email or web address]     PHYSICIAN ADVISORY SERVICES:  FOR [id number] REVIEW - MEDICAL NECESSITY DENIAL  Phone: [phone]  Fax: [phone]  Email: [email or web address]     PRESENTATION DATE: 9/3/2022  5:54 AM  OBERVATION ADMISSION DATE:   INPATIENT ADMISSION DATE: 9/4/22  7:03 PM   DISCHARGE DATE: 9/5/2022  4:15 PM  DISPOSITION: Home/Self Care Home/Self Care      IMPORTANT INFORMATION:  Send all requests for admission clinical reviews, approved or denied determinations and any other requests to dedicated fax number below belonging to the campus where the patient is receiving treatment   List of dedicated fax numbers:  [address] DENIALS (Administrative/Medical Necessity) [phone]   [address] (Maternity/NICU/Pediatrics) [phone]   [facility] [phone]   130 [facility] [phone]   [address] [place] [address] [phone]   [address] [place] [address] [address] [place] [address],[address] [phone]   [facility]  [phone]   2205 [facility], [facility]  2401 [facility] [place] 1000 [facility] [phone]

## 2022-09-08 ENCOUNTER — OFFICE VISIT (OUTPATIENT)
Dept: NEPHROLOGY | Facility: CLINIC | Age: 72
End: 2022-09-08
Payer: COMMERCIAL

## 2022-09-08 VITALS
HEIGHT: 62 IN | SYSTOLIC BLOOD PRESSURE: 118 MMHG | DIASTOLIC BLOOD PRESSURE: 70 MMHG | BODY MASS INDEX: 22.82 KG/M2 | WEIGHT: 124 LBS | HEART RATE: 80 BPM

## 2022-09-08 DIAGNOSIS — N18.9 CHRONIC KIDNEY DISEASE, UNSPECIFIED CKD STAGE: Primary | ICD-10-CM

## 2022-09-08 PROCEDURE — 1111F DSCHRG MED/CURRENT MED MERGE: CPT | Performed by: INTERNAL MEDICINE

## 2022-09-08 PROCEDURE — 99213 OFFICE O/P EST LOW 20 MIN: CPT | Performed by: INTERNAL MEDICINE

## 2022-09-08 PROCEDURE — 3066F NEPHROPATHY DOC TX: CPT | Performed by: FAMILY MEDICINE

## 2022-09-08 NOTE — PATIENT INSTRUCTIONS
You are here for follow-up you have receive some chemotherapy as well as radiation for the bladder cancer and although it sounds like there was some rough times and he did feel well you really look good today to me sounds like your upbeat and her energy is getting better  With respect your kidney function your creatinine levels 2 5 so that is exactly stable from before you even started any treatment for the cancer so the kidney function has noted further damage to in everything stable  Blood pressure is excellent  No changes in medication  You do have a nephrostomy tube on the right to draining keep that right kidney working and you will be following up with Urology to assess the progress of the bladder growth  Please call me if you have any questions or problems before next visit

## 2022-09-08 NOTE — LETTER
September 8, 2022     Rashi Gutiérrez, [address] [facility] [address]   [address] [facility] [address]    Patient: America Snell   YOB: 1950   Date of Visit: 9/8/2022       Dear Dr Abhishek Berry:    Thank you for referring America Snell to me for evaluation  Below are my notes for this consultation  If you have questions, please do not hesitate to call me  I look forward to following your patient along with you  Sincerely,        Jace Garcia MD        CC: No Recipients  Jace Garcia MD  9/8/2022  5:26 PM  Sign when Signing Visit  NEPHROLOGY PROGRESS NOTE    America Snell 74 y o  male MRN: [id number]  Unit/Bed#:  Encounter: [id number]  Reason for Consult:  Chronic kidney disease    Patient is here with his wife and daughter for routine follow-up  I have to say he actually looks good he does not look like he has lost too much weight  They did tell me that he went through chemotherapy but made him very sick so this had to be stopped and he also received radiation to the bladder and that is been completed  He does have a right nephrostomy tube in  He did tell me that he was in the emergency room the other day cause he could not urinate they attempted to put a Webb catheter in were unsuccessful and he was bleeding  He then states that all the sudden he spontaneously urinated  He was observed in the hospital and had no significant residual and urology had seen him and he states he is urinating well  Urine in his nephrostomy bag is clear  ASSESSMENT/PLAN:  1  Renal    Patient has chronic renal insufficiency and in the past have attributed this to tacrolimus toxicity as well as nephrosclerosis  His latest creatinine is 2 4 which is stable even looking back to prior to him being diagnosed with his bladder cancer  So despite going to his chemo having obstructive uropathy creatinine stable at his baseline  Presently he has a right nephrostomy tube    He did complete radiation for the bladder cancer was unable to complete more cycles of chemotherapy due to toxicity  He will be seeing Urology likely will have another cystoscopy at some point to assess the progress and results of the radiation therapy on the bladder tumor  His blood pressure is well controlled  At this point he is eating better  Will continue with his current medications and follow-up as scheduled  Continue follow-up with Urology  Follow-up with me in 4 months with repeat blood work  He was told to call if there is any problems or questions before next visit  SUBJECTIVE:  Review of Systems   Constitutional: Positive for weight loss  Negative for chills, diaphoresis, fever and malaise/fatigue  HENT: Negative  Eyes: Negative  Cardiovascular: Negative  Negative for chest pain, dyspnea on exertion, leg swelling and orthopnea  Skin: Negative  Gastrointestinal: Negative for abdominal pain, diarrhea, nausea and vomiting  Genitourinary: Negative for dysuria, flank pain and hematuria  Right PCN tube present  Neurological: Negative for dizziness, focal weakness, headaches and weakness  Psychiatric/Behavioral: Negative for altered mental status, depression, hallucinations and hypervigilance  OBJECTIVE:  Current Weight: Weight - Scale: 56 2 kg (124 lb)  [email or web address]     Blood pressure 118/70, pulse 80, height 5' 2" (1 575 m), weight 56 2 kg (124 lb)  , Body mass index is 22 68 kg/m²  [unfilled]    Physical Exam: /70 (BP Location: Left arm, Patient Position: Sitting, Cuff Size: Standard)   Pulse 80   Ht 5' 2" (1 575 m)   Wt 56 2 kg (124 lb)   BMI 22 68 kg/m²   Physical Exam  Constitutional:       General: He is not in acute distress  Appearance: He is not toxic-appearing or diaphoretic  HENT:      Head: Normocephalic and atraumatic  Nose:      Comments: Wearing mask  Mouth/Throat:      Comments: Wearing mask  Eyes:      General: No scleral icterus       Extraocular Movements: Extraocular movements intact  Cardiovascular:      Rate and Rhythm: Normal rate and regular rhythm  Heart sounds: No friction rub  No gallop  Comments: No edema  Pulmonary:      Effort: Pulmonary effort is normal  No respiratory distress  Breath sounds: No wheezing, rhonchi or rales  Abdominal:      General: Bowel sounds are normal  There is no distension  Palpations: Abdomen is soft  Tenderness: There is no abdominal tenderness  There is no rebound  Musculoskeletal:      Cervical back: Normal range of motion and neck supple  Neurological:      General: No focal deficit present  Mental Status: He is alert and oriented to person, place, and time  Mental status is at baseline  Psychiatric:         Mood and Affect: Mood normal          Behavior: Behavior normal          Thought Content:  Thought content normal          Judgment: Judgment normal          Medications:    Current Outpatient Medications:     Blood Glucose Calibration (OT ULTRA/FASTTK CNTRL SOLN) SOLN, USE AS DIRECTED, Disp: 1 each, Rfl: 0    cephalexin (KEFLEX) 500 mg capsule, Take 1 capsule (500 mg total) by mouth every 12 (twelve) hours for 4 days, Disp: 8 capsule, Rfl: 0    clopidogrel (PLAVIX) 75 mg tablet, Take 1 tablet (75 mg total) by mouth daily, Disp: 90 tablet, Rfl: 3    Comfort EZ Pen Needles 32G X 4 MM MISC, USE 3-4 AS DIRECTED, Disp: 100 each, Rfl: 5    Continuous Blood Gluc  (FreeStyle Frank 14 Day [place]) NATALIA, Use 1 Device continuous, Disp: 1 each, Rfl: 0    Continuous Blood Gluc Sensor (FreeStyle Frank 14 Day Sensor) MISC, Use 1 Device 4 (four) times a day, Disp: 1 each, Rfl: 5    Incontinence Supply Disposable (Incontinence Brief Medium) MISC, Use 4 (four) times a day, Disp: 120 each, Rfl: 0    insulin detemir (Levemir FlexTouch) 100 Units/mL injection pen, Inject 15 Units under the skin daily at bedtime, Disp: 15 mL, Rfl: 0    INSULIN SYRINGE 1CC/29G 29G X 1/2" 1 ML MISC, by Does not apply route, Disp: , Rfl:     Lancet Devices (Lancing Device) MISC, USE AS DIRECTED, Disp: 1 each, Rfl: 0    Lancets MISC, by Does not apply route, Disp: , Rfl:     loperamide (IMODIUM) 2 mg capsule, Take 1 capsule (2 mg total) by mouth 2 (two) times a day as needed for diarrhea [place] carmen tableta dos veces por cynthia si tiene diarrea, Disp: 90 capsule, Rfl: 0    Nutritional Supplements (Glucerna Shake) LIQD, Take 1 Bottle by mouth 3 (three) times a day, Disp: 07416 mL, Rfl: 0    OneTouch Ultra test strip, TEST UP TO 3 TIMES A DAY, Disp: 100 each, Rfl: 0    phenazopyridine (PYRIDIUM) 100 mg tablet, Take 1 tablet (100 mg total) by mouth 3 (three) times a week [place] en viernes, en lunes, en miercoles, y despues quitalo, Disp: 3 tablet, Rfl: 0    pregabalin (LYRICA) 50 mg capsule, TAKE 1 CAPSULE (50 MG TOTAL) BY MOUTH 3 (THREE) TIMES A DAY, Disp: 90 capsule, Rfl: 5    rosuvastatin (CRESTOR) 40 MG tablet, TAKE ONE (1) TABLET BY MOUTH DAILY, Disp: 30 tablet, Rfl: 5    sodium chloride, PF, 0 9 %, 10 mL by Intracatheter route daily Intracatheter flushing daily   May substitute prefilled syringe with normal saline 10 mL vials, 10 mL syringes, and 18 g blunt needles, Disp: 300 mL, Rfl: 2    tacrolimus (PROGRAF) 1 mg capsule, Take 1 capsule (1 mg total) by mouth every 12 (twelve) hours, Disp: 180 capsule, Rfl: 3    tamsulosin (FLOMAX) 0 4 mg, Take 1 capsule (0 4 mg total) by mouth daily with dinner, Disp: 90 capsule, Rfl: 0    temazepam (RESTORIL) 30 mg capsule, TAKE 1 CAPSULE (30 MG TOTAL) BY MOUTH DAILY AT BEDTIME, Disp: 30 capsule, Rfl: 5    triamcinolone (KENALOG) 0 1 % cream, Apply topically 2 (two) times a day Apply to affected area, Disp: , Rfl:     zolpidem (AMBIEN) 10 mg tablet, TAKE 1 TABLET (10 MG TOTAL) BY MOUTH DAILY AT BEDTIME, Disp: 30 tablet, Rfl: 3    Current Facility-Administered Medications:     lidocaine (URO-JET, GLYDO) 2 % urethral/mucosal gel 1 application, 1 application, Urethral, Daily PRN, Chasidy Tuttle MD    Laboratory Results:  Lab Results   Component Value Date    WBC 5 66 09/05/2022    HGB 8 0 (L) 09/05/2022    HCT 26 2 (L) 09/05/2022    MCV 80 (L) 09/05/2022    PLT 76 (L) 09/05/2022     Lab Results   Component Value Date    SODIUM 141 09/05/2022    K 3 9 09/05/2022     (H) 09/05/2022    CO2 22 09/05/2022    BUN 38 (H) 09/05/2022    CREATININE 2 42 (H) 09/05/2022    GLUC 70 09/05/2022    CALCIUM 7 9 (L) 09/05/2022     Lab Results   Component Value Date    CALCIUM 7 9 (L) 09/05/2022    PHOS 3 8 02/10/2022     No results found for: LABPROT

## 2022-09-08 NOTE — PROGRESS NOTES
NEPHROLOGY PROGRESS NOTE    Sam Morales 74 y o  male MRN: [id number]  Unit/Bed#:  Encounter: [id number]  Reason for Consult:  Chronic kidney disease    Patient is here with his wife and daughter for routine follow-up  I have to say he actually looks good he does not look like he has lost too much weight  They did tell me that he went through chemotherapy but made him very sick so this had to be stopped and he also received radiation to the bladder and that is been completed  He does have a right nephrostomy tube in  He did tell me that he was in the emergency room the other day cause he could not urinate they attempted to put a Webb catheter in were unsuccessful and he was bleeding  He then states that all the sudden he spontaneously urinated  He was observed in the hospital and had no significant residual and urology had seen him and he states he is urinating well  Urine in his nephrostomy bag is clear  ASSESSMENT/PLAN:  1  Renal    Patient has chronic renal insufficiency and in the past have attributed this to tacrolimus toxicity as well as nephrosclerosis  His latest creatinine is 2 4 which is stable even looking back to prior to him being diagnosed with his bladder cancer  So despite going to his chemo having obstructive uropathy creatinine stable at his baseline  Presently he has a right nephrostomy tube  He did complete radiation for the bladder cancer was unable to complete more cycles of chemotherapy due to toxicity  He will be seeing Urology likely will have another cystoscopy at some point to assess the progress and results of the radiation therapy on the bladder tumor  His blood pressure is well controlled  At this point he is eating better  Will continue with his current medications and follow-up as scheduled  Continue follow-up with Urology  Follow-up with me in 4 months with repeat blood work        He was told to call if there is any problems or questions before next visit     SUBJECTIVE:  Review of Systems   Constitutional: Positive for weight loss  Negative for chills, diaphoresis, fever and malaise/fatigue  HENT: Negative  Eyes: Negative  Cardiovascular: Negative  Negative for chest pain, dyspnea on exertion, leg swelling and orthopnea  Skin: Negative  Gastrointestinal: Negative for abdominal pain, diarrhea, nausea and vomiting  Genitourinary: Negative for dysuria, flank pain and hematuria  Right PCN tube present  Neurological: Negative for dizziness, focal weakness, headaches and weakness  Psychiatric/Behavioral: Negative for altered mental status, depression, hallucinations and hypervigilance  OBJECTIVE:  Current Weight: Weight - Scale: 56 2 kg (124 lb)  [email or web address]     Blood pressure 118/70, pulse 80, height 5' 2" (1 575 m), weight 56 2 kg (124 lb)  , Body mass index is 22 68 kg/m²  [unfilled]    Physical Exam: /70 (BP Location: Left arm, Patient Position: Sitting, Cuff Size: Standard)   Pulse 80   Ht 5' 2" (1 575 m)   Wt 56 2 kg (124 lb)   BMI 22 68 kg/m²   Physical Exam  Constitutional:       General: He is not in acute distress  Appearance: He is not toxic-appearing or diaphoretic  HENT:      Head: Normocephalic and atraumatic  Nose:      Comments: Wearing mask  Mouth/Throat:      Comments: Wearing mask  Eyes:      General: No scleral icterus  Extraocular Movements: Extraocular movements intact  Cardiovascular:      Rate and Rhythm: Normal rate and regular rhythm  Heart sounds: No friction rub  No gallop  Comments: No edema  Pulmonary:      Effort: Pulmonary effort is normal  No respiratory distress  Breath sounds: No wheezing, rhonchi or rales  Abdominal:      General: Bowel sounds are normal  There is no distension  Palpations: Abdomen is soft  Tenderness: There is no abdominal tenderness  There is no rebound     Musculoskeletal:      Cervical back: Normal range of motion and neck supple  Neurological:      General: No focal deficit present  Mental Status: He is alert and oriented to person, place, and time  Mental status is at baseline  Psychiatric:         Mood and Affect: Mood normal          Behavior: Behavior normal          Thought Content:  Thought content normal          Judgment: Judgment normal          Medications:    Current Outpatient Medications:     Blood Glucose Calibration (OT ULTRA/FASTTK CNTRL SOLN) SOLN, USE AS DIRECTED, Disp: 1 each, Rfl: 0    cephalexin (KEFLEX) 500 mg capsule, Take 1 capsule (500 mg total) by mouth every 12 (twelve) hours for 4 days, Disp: 8 capsule, Rfl: 0    clopidogrel (PLAVIX) 75 mg tablet, Take 1 tablet (75 mg total) by mouth daily, Disp: 90 tablet, Rfl: 3    Comfort EZ Pen Needles 32G X 4 MM MISC, USE 3-4 AS DIRECTED, Disp: 100 each, Rfl: 5    Continuous Blood Gluc  (FreeStyle Frank 14 Day [place]) NATALIA, Use 1 Device continuous, Disp: 1 each, Rfl: 0    Continuous Blood Gluc Sensor (FreeStyle Frank 14 Day Sensor) MISC, Use 1 Device 4 (four) times a day, Disp: 1 each, Rfl: 5    Incontinence Supply Disposable (Incontinence Brief Medium) MISC, Use 4 (four) times a day, Disp: 120 each, Rfl: 0    insulin detemir (Levemir FlexTouch) 100 Units/mL injection pen, Inject 15 Units under the skin daily at bedtime, Disp: 15 mL, Rfl: 0    INSULIN SYRINGE 1CC/29G 29G X 1/2" 1 ML MISC, by Does not apply route, Disp: , Rfl:     Lancet Devices (Lancing Device) MISC, USE AS DIRECTED, Disp: 1 each, Rfl: 0    Lancets MISC, by Does not apply route, Disp: , Rfl:     loperamide (IMODIUM) 2 mg capsule, Take 1 capsule (2 mg total) by mouth 2 (two) times a day as needed for diarrhea [place] carmen tableta dos veces por cynthia si tiene diarrea, Disp: 90 capsule, Rfl: 0    Nutritional Supplements (Glucerna Shake) LIQD, Take 1 Bottle by mouth 3 (three) times a day, Disp: 35228 mL, Rfl: 0    OneTouch Ultra test strip, TEST UP TO 3 TIMES A DAY, Disp: 100 each, Rfl: 0    phenazopyridine (PYRIDIUM) 100 mg tablet, Take 1 tablet (100 mg total) by mouth 3 (three) times a week [place] en viernes, en lunes, en miercoles, y despues quitalo, Disp: 3 tablet, Rfl: 0    pregabalin (LYRICA) 50 mg capsule, TAKE 1 CAPSULE (50 MG TOTAL) BY MOUTH 3 (THREE) TIMES A DAY, Disp: 90 capsule, Rfl: 5    rosuvastatin (CRESTOR) 40 MG tablet, TAKE ONE (1) TABLET BY MOUTH DAILY, Disp: 30 tablet, Rfl: 5    sodium chloride, PF, 0 9 %, 10 mL by Intracatheter route daily Intracatheter flushing daily   May substitute prefilled syringe with normal saline 10 mL vials, 10 mL syringes, and 18 g blunt needles, Disp: 300 mL, Rfl: 2    tacrolimus (PROGRAF) 1 mg capsule, Take 1 capsule (1 mg total) by mouth every 12 (twelve) hours, Disp: 180 capsule, Rfl: 3    tamsulosin (FLOMAX) 0 4 mg, Take 1 capsule (0 4 mg total) by mouth daily with dinner, Disp: 90 capsule, Rfl: 0    temazepam (RESTORIL) 30 mg capsule, TAKE 1 CAPSULE (30 MG TOTAL) BY MOUTH DAILY AT BEDTIME, Disp: 30 capsule, Rfl: 5    triamcinolone (KENALOG) 0 1 % cream, Apply topically 2 (two) times a day Apply to affected area, Disp: , Rfl:     zolpidem (AMBIEN) 10 mg tablet, TAKE 1 TABLET (10 MG TOTAL) BY MOUTH DAILY AT BEDTIME, Disp: 30 tablet, Rfl: 3    Current Facility-Administered Medications:     lidocaine (URO-JET, GLYDO) 2 % urethral/mucosal gel 1 application, 1 application, Urethral, Daily PRN, Sea Harris MD    Laboratory Results:  Lab Results   Component Value Date    WBC 5 66 09/05/2022    HGB 8 0 (L) 09/05/2022    HCT 26 2 (L) 09/05/2022    MCV 80 (L) 09/05/2022    PLT 76 (L) 09/05/2022     Lab Results   Component Value Date    SODIUM 141 09/05/2022    K 3 9 09/05/2022     (H) 09/05/2022    CO2 22 09/05/2022    BUN 38 (H) 09/05/2022    CREATININE 2 42 (H) 09/05/2022    GLUC 70 09/05/2022    CALCIUM 7 9 (L) 09/05/2022     Lab Results   Component Value Date    CALCIUM 7 9 (L) 09/05/2022    PHOS 3 8 02/10/2022     No results found for: LABPROT

## 2022-09-09 ENCOUNTER — APPOINTMENT (OUTPATIENT)
Dept: RADIATION ONCOLOGY | Facility: HOSPITAL | Age: 72
End: 2022-09-09
Payer: COMMERCIAL

## 2022-09-12 ENCOUNTER — TELEMEDICINE (OUTPATIENT)
Dept: FAMILY MEDICINE CLINIC | Facility: CLINIC | Age: 72
End: 2022-09-12
Payer: COMMERCIAL

## 2022-09-12 VITALS — WEIGHT: 124 LBS | BODY MASS INDEX: 22.82 KG/M2 | TEMPERATURE: 98.9 F | HEIGHT: 62 IN

## 2022-09-12 DIAGNOSIS — Z09 HOSPITAL DISCHARGE FOLLOW-UP: Primary | ICD-10-CM

## 2022-09-12 DIAGNOSIS — U07.1 COVID-19: ICD-10-CM

## 2022-09-12 DIAGNOSIS — N30.01 ACUTE CYSTITIS WITH HEMATURIA: ICD-10-CM

## 2022-09-12 DIAGNOSIS — R33.8 ACUTE URINARY RETENTION: ICD-10-CM

## 2022-09-12 PROBLEM — N39.0 UTI (URINARY TRACT INFECTION): Status: RESOLVED | Noted: 2022-06-10 | Resolved: 2022-09-12

## 2022-09-12 LAB — SARS-COV-2 AG UPPER RESP QL IA: ABNORMAL

## 2022-09-12 PROCEDURE — 1111F DSCHRG MED/CURRENT MED MERGE: CPT | Performed by: FAMILY MEDICINE

## 2022-09-12 PROCEDURE — 87811 SARS-COV-2 COVID19 W/OPTIC: CPT | Performed by: FAMILY MEDICINE

## 2022-09-12 PROCEDURE — 99495 TRANSJ CARE MGMT MOD F2F 14D: CPT | Performed by: FAMILY MEDICINE

## 2022-09-12 RX ORDER — SODIUM CHLORIDE 9 MG/ML
20 INJECTION, SOLUTION INTRAVENOUS ONCE
Status: CANCELLED | OUTPATIENT
Start: 2022-09-13

## 2022-09-12 RX ORDER — BEBTELOVIMAB 87.5 MG/ML
175 INJECTION, SOLUTION INTRAVENOUS ONCE
Status: CANCELLED | OUTPATIENT
Start: 2022-09-13

## 2022-09-12 RX ORDER — ACETAMINOPHEN 325 MG/1
650 TABLET ORAL ONCE AS NEEDED
Status: CANCELLED | OUTPATIENT
Start: 2022-09-13

## 2022-09-12 RX ORDER — ONDANSETRON 2 MG/ML
4 INJECTION INTRAMUSCULAR; INTRAVENOUS ONCE AS NEEDED
Status: CANCELLED | OUTPATIENT
Start: 2022-09-13

## 2022-09-12 RX ORDER — ALBUTEROL SULFATE 90 UG/1
3 AEROSOL, METERED RESPIRATORY (INHALATION) ONCE AS NEEDED
Status: CANCELLED | OUTPATIENT
Start: 2022-09-13

## 2022-09-12 RX ORDER — ISOPROPYL ALCOHOL 0.75 G/1
SWAB TOPICAL
COMMUNITY
Start: 2022-09-05

## 2022-09-12 RX ORDER — SODIUM CHLORIDE 9 MG/ML
INJECTION, SOLUTION INTRAVENOUS
COMMUNITY
Start: 2022-07-20 | End: 2022-10-10

## 2022-09-12 NOTE — ASSESSMENT & PLAN NOTE
Discussed infusion with wife  His has acitve cancer and kidney disease  Will schedule infusion   Symptoms started yesterday  Day 1 today  Home test documentation today shown to me on video

## 2022-09-12 NOTE — PROGRESS NOTES
Virtual TCM Visit:    Verification of patient location:    Patient is located in the following state in which I hold an active license [place]        Assessment/Plan:        Problem List Items Addressed This Visit        Genitourinary    Acute cystitis    Acute urinary retention       Other    Hospital discharge follow-up - Primary    COVID-19     Discussed infusion with wife  His has acitve cancer and kidney disease  Will schedule infusion   Symptoms started yesterday  Day 1 today  Home test documentation today shown to me on video                  Reason for visit is tcm/covid    Encounter provider Laura Elaine DO       Provider located at [place] [address] [place] [id number]      Recent Visits  No visits were found meeting these conditions  Showing recent visits within past 7 days and meeting all other requirements  Today's Visits  Date Type Provider Dept   09/12/22 Telemedicine DO Tommie Herrera   Showing today's visits and meeting all other requirements  Future Appointments  No visits were found meeting these conditions  Showing future appointments within next 150 days and meeting all other requirements       After connecting through [employer], the patient was identified by name and date of birth  Mackenzie Juan was informed that this is a telemedicine visit and that the visit is being conducted through [address] [place] [address] Now and patient was informed that this is a secure, HIPAA-compliant platform  He agrees to proceed     My office door was closed  No one else was in the room  He acknowledged consent and understanding of privacy and security of the video platform  The patient has agreed to participate and understands they can discontinue the visit at any time  Patient is aware this is a billable service  Subjective:     Patient ID: Mackenzie Juan is a 74 y o  male      Visit switched to virtual today for tcm as pt tested positive for covid today  Jami Stone admitted 9/3-9/5 for urinary retention, bladder emptied after his CT  Urine cultures revealed infection and he was sent home with keflex  US of kidneys and bladder show no hydonephrolosis    Started with covid symptoms yesterday, fatigue   Cough, runny nose, sore throat  Home test positive- confirmed visually on mychart with me      Review of Systems   Constitutional: Positive for chills and fatigue  HENT: Positive for congestion, rhinorrhea and sore throat  Respiratory: Positive for cough  Gastrointestinal: Negative  Endocrine: Negative  Genitourinary: Negative  Musculoskeletal: Negative  Skin: Negative  Allergic/Immunologic: Negative  Neurological: Negative  Hematological: Negative  Psychiatric/Behavioral: Negative  Objective:    Vitals:    09/12/22 1112   Temp: 98 9 °F (37 2 °C)   Weight: 56 2 kg (124 lb)   Height: 5' 2" (1 575 m)       Physical Exam  Vitals reviewed  Constitutional:       Appearance: Normal appearance  He is ill-appearing  Pulmonary:      Effort: No respiratory distress  Neurological:      General: No focal deficit present  Mental Status: He is alert and oriented to person, place, and time  Psychiatric:         Mood and Affect: Mood normal          Behavior: Behavior normal          Thought Content: Thought content normal          Judgment: Judgment normal              Transitional Care Management Review:  Idania Cummins is a 74 y o  male here for TCM follow up       During the TCM phone call patient stated:    TCM Call     Date and time call was made  9/6/2022  5:36 PM    Hospital care reviewed  Records not available    Patient was hospitialized at  [facility]    Date of Admission  09/03/22    Date of discharge  09/05/22    Diagnosis  Urinary retention    Disposition  Home    Were the patients medications reviewed and updated  No    Current Symptoms  None    Pain with urination severity  Mild    Pain with urination  Gradual      TCM Call     Post hospital issues  None    Should patient be enrolled in anticoag monitoring? Yes    Scheduled for follow up? Yes    Did you obtain your prescribed medications  Yes    Do you need help managing your prescriptions or medications  No    Is transportation to your appointment needed  No    I have advised the patient to call PCP with any new or worsening symptoms  Rajiv Slaughter MA    Living Arrangements  Spouse or Significiant other    Support System  None    Are you recieving any outpatient services  No    Are you recieving home care services  No    Types of home care services  Nurse visit    Are you using any community resources  No    Current waiver services  No    Have you fallen in the last 12 months  Yes    How many times  2    Interperter language line needed  Yes    Counseling  Patient        I spent 20 minutes with the patient during this visit  Vincent Min,       VIRTUAL VISIT Vini Hernandez verbally agrees to participate in [employer]  Pt is aware that [employer] could be limited without vital signs or the ability to perform a full hands-on physical Kay Cheatham understands he or the provider may request at any time to terminate the video visit and request the patient to seek care or treatment in person

## 2022-09-12 NOTE — PROGRESS NOTES
COVID-19 Outpatient Progress Note    Assessment/Plan:    Problem List Items Addressed This Visit        Genitourinary    Acute cystitis     Resolved  Off antibiotics         Acute urinary retention     Resolved  improved            Other    Hospital discharge follow-up - Primary    COVID-19     Discussed infusion with wife  His has acitve cancer and kidney disease  Will schedule infusion   Symptoms started yesterday  Day 1 today  Home test documentation today shown to me on video         Relevant Orders    [facility] Covid home test flowsheet (Completed)    Covid at [address] (Completed)         Recent Results (from the past 24 hour(s))   Covid at Home Test Kit    Collection Time: 09/12/22 11:49 AM   Result Value Ref Range    EXT SARS-CoV-2 Ag Home Testing Positive (Patient Reported) (A) Negative (Patient Reported)     Disposition:     Home test positive- shown to me on video      Patient meets criteria for Bebtelovimab infusion  They were counseled in regards to risks, benefits, and side effects of this infusion  Wisam Flies is an investigational medicine used to treat mild-to-moderate symptoms of COVID-19 in adults and children (15years of age and older weighing at least 80 pounds (40 kg)) with positive results of direct SARS-CoV-2 viral testing, and who are at high risk of progression to severe COVID-19, including hospitalization or death, and for whom other COVID-19 treatment options approved or authorized by FDA are not available or clinically appropriate  Bebtelovimab is investigational because it is still being studied  There is limited information about the safety and effectiveness of using bebtelovimab to treat people with mild-to-moderate COVID-19  The FDA has authorized the emergency use of bebtelovimab for the treatment of COVID-19 under an Emergency Use Authorization (EUA)       Wisam Flies is not authorized for use in people who:  - are likely to be infected with a SARS-CoV-2 variant that is not able to be treated by bebtelovimab based on the circulating variants in your area (ask your health care provider about FDA and CDCs latest information on circulating variants by geographic area), or  - are hospitalized due to COVID-19, or  - require oxygen therapy and/or respiratory support due to COVID-19, or  - require an increase in baseline oxygen flow rate and/or respiratory support due to [address] and are on chronic oxygen therapy and/or respiratory support due to underlying nonCOVID-19 related comorbidity  How will I receive Bebtelovimab? Chrystie Ormond will be given as an injection through a vein (intravenously or IV) over at least 30 seconds  You will be observed by your healthcare provider for at least 1 hour after you receive bebtelovimab  Possible side effects of Bebtelovimab: Allergic reactions can happen during and after infusion with bebtelovimab  Possible reactions include: fever, chills, nausea, headache, shortness of breath, low or high blood pressure, rapid or slow heart rate, chest discomfort or pain, weakness, confusion, feeling tired, wheezing, swelling of your lips, face, or throat, rash including hives, itching, muscle aches, dizziness, and sweating  These reactions may be severe or life threatening  Worsening symptoms after treatment: You may experience new or worsening symptoms after infusion, including fever, difficulty breathing, rapid or slow heart rate, tiredness, weakness or confusion  If these occur, contact your healthcare provider or seek immediate medical attention as some of these events have required hospitalization  It is unknown if these events are related to treatment or are due to the progression of COVID19  The side effects of getting any medicine by vein may include brief pain, bleeding, bruising of the skin, soreness, swelling, and possible infection at the infusion site  These are not all the possible side effects of bebtelovimab   Not a lot of people have been given bebtelovimab  Serious and unexpected side effects may happen  Bebtelovimab are still being studied so it is possible that all of the risks are not known at this time  It is possible that bebtelovimab could interfere with your body's own ability to fight off a future infection of SARS-CoV-2  Similarly, bebtelovimab may reduce your bodys immune response to a vaccine for SARS-CoV-2  Specific studies have not been conducted to address these possible risks  Talk to your healthcare provider if you have any questions  Emergency Use Authorization:    The [facility] FDA has made bebtelovimab available under an emergency access mechanism called an EUA  The EUA is supported by a [place] of [employer] and Human [employer] ([facility]) declaration that circumstances exist to justify the emergency use of drugs and biological products during the COVID-19 pandemic  Bebtelovimab have not undergone the same type of review as an FDA-approved or cleared product  The FDA may issue an EUA when certain criteria are met, which includes that there are no adequate, approved, and available alternatives  In addition, the FDA decision is based on the totality of scientific evidence available showing that it is reasonable to believe that the product meets certain criteria for safety, performance, and labeling and may be effective in treatment of patients during the COVID-19 pandemic  All of these criteria must be met to allow for the product to be used in the treatment of patients during the COVID-19 pandemic  The EUA for bebtelovimab together is in effect for the duration of the COVID-19 declaration justifying emergency use of these products, unless terminated or revoked (after which the product may no longer be used)  What if I am pregnant or breastfeeding? There is no experience treating pregnant women or breastfeeding mothers with bebtelovimab   For a mother and unborn baby, the benefit of receiving bebtelovimab may be greater than the risk from the treatment  If you are pregnant or breastfeeding, discuss your options and specific situation with your healthcare provider  How do I report side effects with Bebtelovimab? Contact your healthcare provider if you have any side effects that bother you or do not go away  Report side effects to FDA MedWatch at www fda gov/medwatch, or call 8-317-[facility]-8358 or to [address] [place] [address]  as shown below  Email: [email or web address]   Fax number: [phone]   Telephone number: 1-085-ZKGULN67 ([phone])    Full fact sheet document for patients can be found at: http://www [employer]/    The patient consents to proceed with bebtelovimab infusion  I have spent 20 minutes directly with the patient  Encounter provider: Rashi Gutiérrez DO     Provider located at: [address] [place] [id number]     Recent Visits  No visits were found meeting these conditions  Showing recent visits within past 7 days and meeting all other requirements  Today's Visits  Date Type Provider Dept   09/12/22 Telemedicine Rashi Gutiérrez DO [facility]   Showing today's visits and meeting all other requirements  Future Appointments  No visits were found meeting these conditions  Showing future appointments within next 150 days and meeting all other requirements     This virtual check-in was done via [employer] and patient was informed that this is a secure, HIPAA-compliant platform  He agrees to proceed  Patient agrees to participate in a virtual check in via telephone or video visit instead of presenting to the office to address urgent/immediate medical needs  Patient is aware this is a billable service  He acknowledged consent and understanding of privacy and security of the video platform   The patient has agreed to participate and understands they can discontinue the visit at any time     After connecting through [facility], the patient was identified by name and date of birth  Antolin Bennett was informed that this was a telemedicine visit and that the exam was being conducted confidentially over secure lines  My office door was closed  No one else was in the room  Antolin Bennett acknowledged consent and understanding of privacy and security of the telemedicine visit  I informed the patient that I have reviewed his record in Epic and presented the opportunity for him to ask any questions regarding the visit today  The patient agreed to participate  Verification of patient location:  Patient is located in the following state in which I hold an active license: [place]    Subjective:   Antolin Bennett is a 74 y o  male who is concerned about COVID-19  Patient's symptoms include fatigue, nasal congestion, rhinorrhea, sore throat and cough       - Date of symptom onset: 9/11/2022      COVID-19 vaccination status: Fully vaccinated with booster    Exposure:   Contact with a person who is under investigation (PUI) for or who is positive for COVID-19 within the last 14 days?: No    Hospitalized recently for fever and/or lower respiratory symptoms?: No      Currently a healthcare worker that is involved in direct patient care?: No      Works in a special setting where the risk of COVID-19 transmission may be high? (this may include long-term care, correctional and halfway facilities; homeless shelters; assisted-living facilities and group homes ): No      Resident in a special setting where the risk of COVID-19 transmission may be high? (this may include long-term care, correctional and halfway facilities; homeless shelters; assisted-living facilities and group homes ): No      Home test positive    Lab Results   Component Value Date    Lisa Mac Not Detected 11/12/2020    [address] Positive (Patient Reported) (A) 09/12/2022     Past Medical History:   Diagnosis Date    Alcoholism ([address] )     Cerebral artery aneurysm     Change in mental state     last assessed 5/18/15; resolved 10/27/15    Diabetes mellitus ([facility] )     [facility] dependence [facility]     Fatigue     last assessed 1/26/15; resolved 5/24/16    Hepatitis [address] discharge follow-up 12/21/2018    Kidney disease     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[facility] )     Liver transplant recipient [facility])     Renal disorder     Subdural hygroma     2/27/14; resolved 7/28/15    Thrombocytopenia ([facility] ) 9/20/2017     Past Surgical History:   Procedure Laterality Date    BRAIN SURGERY  02/12/2014    left frontotemporal 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    CATARACT EXTRACTION      CHOLECYSTECTOMY      FL LUMBAR PUNCTURE DIAGNOSTIC  12/14/2018    IR NEPHROSTOMY TUBE CHECK/CHANGE/REPOSITION/REINSERTION/UPSIZE  7/29/2022    IR NEPHROSTOMY TUBE CHECK/CHANGE/REPOSITION/REINSERTION/UPSIZE  8/31/2022    IR NEPHROSTOMY TUBE PLACEMENT  5/28/2022    IR PICC LINE  12/14/2018    IR PORT PLACEMENT  6/23/2022    LIVER TRANSPLANTATION      ROTATOR CUFF REPAIR      SHOULDER SURGERY      TRANSURETHRAL RESECTION OF BLADDER TUMOR N/A 5/26/2022    Procedure: TRANSURETHRAL RESECTION OF BLADDER TUMOR (TURBT);   Surgeon: Parag Dee MD;  [facility];  Service: Urology     Current Outpatient Medications   Medication Sig Dispense Refill    Alcohol Swabs (B-D SINGLE USE SWABS REGULAR) PADS USE 2-3 TIMES DAILY AS NEEDED      Blood Glucose Calibration (OT ULTRA/FASTTK CNTRL SOLN) SOLN USE AS DIRECTED 1 each 0    clopidogrel (PLAVIX) 75 mg tablet Take 1 tablet (75 mg total) by mouth daily 90 tablet 3    Comfort EZ Pen Needles 32G X 4 MM MISC USE 3-4 AS DIRECTED 100 each 5    Continuous Blood Gluc  (FreeStyle Frank 14 Day [place]) NATALIA Use 1 Device continuous 1 each 0    Continuous Blood Gluc Sensor (FreeStyle Frank 14 Day Sensor) MISC Use 1 Device 4 (four) times a day 1 each 5    Incontinence Supply Disposable (Incontinence Brief Medium) MISC Use 4 (four) times a day 120 each 0    insulin detemir (Levemir FlexTouch) 100 Units/mL injection pen Inject 15 Units under the skin daily at bedtime 15 mL 0    INSULIN SYRINGE 1CC/29G 29G X 1/2" 1 ML MISC by Does not apply route      Lancet Devices (Lancing Device) MISC USE AS DIRECTED 1 each 0    Lancets MISC by Does not apply route      loperamide (IMODIUM) 2 mg capsule Take 1 capsule (2 mg total) by mouth 2 (two) times a day as needed for diarrhea [place] carmne tableta dos veces por cynthia si tiene diarrea 90 capsule 0    Nutritional Supplements (Glucerna Shake) LIQD Take 1 Bottle by mouth 3 (three) times a day 47918 mL 0    OneTouch Ultra test strip TEST UP TO 3 TIMES A  each 0    pregabalin (LYRICA) 50 mg capsule TAKE 1 CAPSULE (50 MG TOTAL) BY MOUTH 3 (THREE) TIMES A DAY 90 capsule 5    rosuvastatin (CRESTOR) 40 MG tablet TAKE ONE (1) TABLET BY MOUTH DAILY 30 tablet 5    sodium chloride, PF, 0 9 % 10 mL by Intracatheter route daily Intracatheter flushing daily   May substitute prefilled syringe with normal saline 10 mL vials, 10 mL syringes, and 18 g blunt needles 300 mL 2    tacrolimus (PROGRAF) 1 mg capsule Take 1 capsule (1 mg total) by mouth every 12 (twelve) hours 180 capsule 3    tamsulosin (FLOMAX) 0 4 mg Take 1 capsule (0 4 mg total) by mouth daily with dinner 90 capsule 0    temazepam (RESTORIL) 30 mg capsule TAKE 1 CAPSULE (30 MG TOTAL) BY MOUTH DAILY AT BEDTIME 30 capsule 5    zolpidem (AMBIEN) 10 mg tablet TAKE 1 TABLET (10 MG TOTAL) BY MOUTH DAILY AT BEDTIME 30 tablet 3    sodium chloride        Current Facility-Administered Medications   Medication Dose Route Frequency Provider Last Rate Last Admin    lidocaine (URO-JET, GLYDO) 2 % urethral/mucosal gel 1 application  1 application Urethral Daily PRN Kourtney Villalta MD         Allergies   Allergen Reactions    Tequin [Gatifloxacin] Rash    Ciprofloxacin     Iv Contrast [Iodinated Diagnostic Agents]  Levofloxacin Rash    Lipitor [Atorvastatin] Rash     Rash and itching    Omnipaque [Iohexol]        Review of Systems   Constitutional: Positive for fatigue  HENT: Positive for congestion, rhinorrhea and sore throat  Respiratory: Positive for cough  Cardiovascular: Negative  Gastrointestinal: Negative  Endocrine: Negative  Genitourinary: Negative  Musculoskeletal: Negative  Skin: Negative  Allergic/Immunologic: Negative  Neurological: Negative  Hematological: Negative  Psychiatric/Behavioral: Negative  Objective:    Vitals:    09/12/22 1112   Temp: 98 9 °F (37 2 °C)   Weight: 56 2 kg (124 lb)   Height: 5' 2" (1 575 m)       Physical Exam  Vitals reviewed  Constitutional:       Appearance: He is ill-appearing  Pulmonary:      Effort: No respiratory distress  Breath sounds: No stridor  Neurological:      General: No focal deficit present  Mental Status: He is oriented to person, place, and time  Psychiatric:         Mood and Affect: Mood normal          Behavior: Behavior normal          Thought Content:  Thought content normal          Judgment: Judgment normal

## 2022-09-13 ENCOUNTER — HOSPITAL ENCOUNTER (OUTPATIENT)
Dept: INFUSION CENTER | Facility: HOSPITAL | Age: 72
Discharge: HOME | End: 2022-09-13
Payer: COMMERCIAL

## 2022-09-13 VITALS
HEART RATE: 81 BPM | TEMPERATURE: 98.5 F | SYSTOLIC BLOOD PRESSURE: 98 MMHG | RESPIRATION RATE: 18 BRPM | OXYGEN SATURATION: 98 % | DIASTOLIC BLOOD PRESSURE: 62 MMHG

## 2022-09-13 DIAGNOSIS — U07.1 COVID-19: Primary | ICD-10-CM

## 2022-09-13 PROCEDURE — M0222 HB BEBTELOVIMAB INJECTION: HCPCS | Performed by: FAMILY MEDICINE

## 2022-09-13 RX ORDER — ACETAMINOPHEN 325 MG/1
650 TABLET ORAL ONCE AS NEEDED
Status: CANCELLED | OUTPATIENT
Start: 2022-09-13

## 2022-09-13 RX ORDER — BEBTELOVIMAB 87.5 MG/ML
175 INJECTION, SOLUTION INTRAVENOUS ONCE
Status: CANCELLED | OUTPATIENT
Start: 2022-09-13

## 2022-09-13 RX ORDER — ONDANSETRON 2 MG/ML
4 INJECTION INTRAMUSCULAR; INTRAVENOUS ONCE AS NEEDED
Status: DISCONTINUED | OUTPATIENT
Start: 2022-09-13 | End: 2022-09-16 | Stop reason: HOSPADM

## 2022-09-13 RX ORDER — ONDANSETRON 2 MG/ML
4 INJECTION INTRAMUSCULAR; INTRAVENOUS ONCE AS NEEDED
Status: CANCELLED | OUTPATIENT
Start: 2022-09-13

## 2022-09-13 RX ORDER — ALBUTEROL SULFATE 90 UG/1
3 AEROSOL, METERED RESPIRATORY (INHALATION) ONCE AS NEEDED
Status: CANCELLED | OUTPATIENT
Start: 2022-09-13

## 2022-09-13 RX ORDER — BEBTELOVIMAB 87.5 MG/ML
175 INJECTION, SOLUTION INTRAVENOUS ONCE
Status: COMPLETED | OUTPATIENT
Start: 2022-09-13 | End: 2022-09-13

## 2022-09-13 RX ORDER — SODIUM CHLORIDE 9 MG/ML
20 INJECTION, SOLUTION INTRAVENOUS ONCE
Status: DISCONTINUED | OUTPATIENT
Start: 2022-09-13 | End: 2022-09-16 | Stop reason: HOSPADM

## 2022-09-13 RX ORDER — ALBUTEROL SULFATE 90 UG/1
3 AEROSOL, METERED RESPIRATORY (INHALATION) ONCE AS NEEDED
Status: DISCONTINUED | OUTPATIENT
Start: 2022-09-13 | End: 2022-09-16 | Stop reason: HOSPADM

## 2022-09-13 RX ORDER — SODIUM CHLORIDE 9 MG/ML
20 INJECTION, SOLUTION INTRAVENOUS ONCE
Status: CANCELLED | OUTPATIENT
Start: 2022-09-13

## 2022-09-13 RX ORDER — ACETAMINOPHEN 325 MG/1
650 TABLET ORAL ONCE AS NEEDED
Status: DISCONTINUED | OUTPATIENT
Start: 2022-09-13 | End: 2022-09-16 | Stop reason: HOSPADM

## 2022-09-13 RX ADMIN — BEBTELOVIMAB 175 MG: 87.5 INJECTION, SOLUTION INTRAVENOUS at 08:08

## 2022-09-13 NOTE — PROGRESS NOTES
Pt stable at time of discharge  Vital signs with in normal limits  Aware that they should have a follow up with their physician with in 24 hours  Copy of discharge instructions given to pt  Port flushed and blood return noted before de accessing  Band aid applied

## 2022-09-14 ENCOUNTER — TELEMEDICINE (OUTPATIENT)
Dept: FAMILY MEDICINE CLINIC | Facility: CLINIC | Age: 72
End: 2022-09-14
Payer: COMMERCIAL

## 2022-09-14 DIAGNOSIS — U07.1 COVID-19: Primary | ICD-10-CM

## 2022-09-14 PROCEDURE — 99442 PR PHYS/QHP TELEPHONE EVALUATION 11-20 MIN: CPT | Performed by: FAMILY MEDICINE

## 2022-09-14 NOTE — PROGRESS NOTES
COVID-19 Outpatient Progress Note    Assessment/Plan:    Problem List Items Addressed This Visit        Other    COVID-19 - Primary     Day 3   S/p infusion  Feeling much better  Encourage to get out of bed  Increase food intake              Disposition:     Patient with moderate or severe illness or has a weakened immune system  They should isolate from others through at least day 10  Isolation can be ended if symptoms are improving and they are fever free for the past 24 hours  If they still have fever or other symptoms have not improved, continue to isolate until they improve  Regardless of when you isolation is ended, avoid being around people who are more likely to get very sick from COVID-19 until at least day 11  I have spent 10 minutes directly with the patient  Encounter provider: Eren Singh DO     Provider located at: [address] [place] [id number]     Recent Visits  Date Type Provider Dept   09/12/22 Telemedicine DO [facility]   Showing recent visits within past 7 days and meeting all other requirements  Today's Visits  Date Type Provider Dept   09/14/22 Telemedicine DO [facility]   Showing today's visits and meeting all other requirements  Future Appointments  No visits were found meeting these conditions  Showing future appointments within next 150 days and meeting all other requirements     This virtual check-in was done via telephone and he agrees to proceed  Patient agrees to participate in a virtual check in via telephone or video visit instead of presenting to the office to address urgent/immediate medical needs  Patient is aware this is a billable service  He acknowledged consent and understanding of privacy and security of the video platform  The patient has agreed to participate and understands they can discontinue the visit at any time      After connecting through Telephone, the patient was identified by name and date of birth  Lanny Pool was informed that this was a telemedicine visit and that the exam was being conducted confidentially over secure lines  My office door was closed  No one else was in the room  Lanny Pool acknowledged consent and understanding of privacy and security of the telemedicine visit  I informed the patient that I have reviewed his record in Epic and presented the opportunity for him to ask any questions regarding the visit today  The patient agreed to participate  It was my intent to perform this visit via video technology but the patient was not able to do a video connection so the visit was completed via audio telephone only  Verification of patient location:  Patient is located in the following state in which I hold an active license: [place]    Subjective:   Lanny Pool is a 74 y o  male who has been screened for COVID-19  Symptom change since last report: resolving  Patient's symptoms include rhinorrhea  Patient denies sore throat, cough and headaches  - Date of symptom onset: 9/11/2022  - Date of positive COVID-19 test: 9/12/2022  Type of test: Home antigen  COVID-19 vaccination status: Fully vaccinated with booster    Dragan Watkins has been staying home and has isolated themselves in his home  He is taking care to not share personal items and is cleaning all surfaces that are touched often, like counters, tabletops, and doorknobs using household cleaning sprays or wipes  He is wearing a mask when he leaves his room  Monoclonal Antibody Follow-up Symptom Questionnaire  I feel overall: much better  My breathing is: much better  My fever is: better  My fatigue is: much better    Had infusion therapy yesterday    Lab Results   Component Value Date    [address] [id number] Not Detected 11/12/2020    [facility] Positive (Patient Reported) (A) 09/12/2022       Review of Systems   HENT: Positive for rhinorrhea  Negative for sore throat      Respiratory: Negative for cough  Neurological: Negative for headaches  Current Outpatient Medications on File Prior to Visit   Medication Sig    Alcohol Swabs (B-D SINGLE USE SWABS REGULAR) PADS USE 2-3 TIMES DAILY AS NEEDED    Blood Glucose Calibration (OT ULTRA/FASTTK CNTRL SOLN) SOLN USE AS DIRECTED    clopidogrel (PLAVIX) 75 mg tablet Take 1 tablet (75 mg total) by mouth daily    Comfort EZ Pen Needles 32G X 4 MM MISC USE 3-4 AS DIRECTED    Continuous Blood Gluc  (FreeStyle Frank 14 Day [place]) NATALIA Use 1 Device continuous    Continuous Blood Gluc Sensor (FreeStyle Frank 14 Day Sensor) MISC Use 1 Device 4 (four) times a day    Incontinence Supply Disposable (Incontinence Brief Medium) MISC Use 4 (four) times a day    insulin detemir (Levemir FlexTouch) 100 Units/mL injection pen Inject 15 Units under the skin daily at bedtime    INSULIN SYRINGE 1CC/29G 29G X 1/2" 1 ML MISC by Does not apply route    Lancet Devices (Lancing Device) MISC USE AS DIRECTED    Lancets MISC by Does not apply route    loperamide (IMODIUM) 2 mg capsule Take 1 capsule (2 mg total) by mouth 2 (two) times a day as needed for diarrhea [place] carmen tableta dos veces por cynthia si tiene diarrea    Nutritional Supplements (Glucerna Shake) LIQD Take 1 Bottle by mouth 3 (three) times a day    OneTouch Ultra test strip TEST UP TO 3 TIMES A DAY    pregabalin (LYRICA) 50 mg capsule TAKE 1 CAPSULE (50 MG TOTAL) BY MOUTH 3 (THREE) TIMES A DAY    rosuvastatin (CRESTOR) 40 MG tablet TAKE ONE (1) TABLET BY MOUTH DAILY    sodium chloride, PF, 0 9 % 10 mL by Intracatheter route daily Intracatheter flushing daily   May substitute prefilled syringe with normal saline 10 mL vials, 10 mL syringes, and 18 g blunt needles    sodium chloride     tacrolimus (PROGRAF) 1 mg capsule Take 1 capsule (1 mg total) by mouth every 12 (twelve) hours    tamsulosin (FLOMAX) 0 4 mg Take 1 capsule (0 4 mg total) by mouth daily with dinner    temazepam (RESTORIL) 30 mg capsule TAKE 1 CAPSULE (30 MG TOTAL) BY MOUTH DAILY AT BEDTIME    zolpidem (AMBIEN) 10 mg tablet TAKE 1 TABLET (10 MG TOTAL) BY MOUTH DAILY AT BEDTIME       Objective: There were no vitals taken for this visit       Physical Exam  Christine Raza DO

## 2022-09-15 ENCOUNTER — TELEPHONE (OUTPATIENT)
Dept: FAMILY MEDICINE CLINIC | Facility: CLINIC | Age: 72
End: 2022-09-15

## 2022-09-15 NOTE — TELEPHONE ENCOUNTER
Maya Moralez called in concerned with patients health  He is COVID positive but all he does is sleep he's very tired not eating or nor drinking much at all  Today he woke up had a cup of coffee a small piece of cake then went back to bed  She's not sure if this is due to COVID or what

## 2022-09-16 ENCOUNTER — APPOINTMENT (OUTPATIENT)
Dept: RADIATION ONCOLOGY | Facility: HOSPITAL | Age: 72
End: 2022-09-16
Payer: COMMERCIAL

## 2022-09-20 ENCOUNTER — TELEPHONE (OUTPATIENT)
Dept: NEUROLOGY | Facility: CLINIC | Age: 72
End: 2022-09-20

## 2022-09-20 ENCOUNTER — APPOINTMENT (EMERGENCY)
Dept: RADIOLOGY | Facility: HOSPITAL | Age: 72
DRG: 641 | End: 2022-09-20
Payer: COMMERCIAL

## 2022-09-20 ENCOUNTER — TELEPHONE (OUTPATIENT)
Dept: FAMILY MEDICINE CLINIC | Facility: CLINIC | Age: 72
End: 2022-09-20

## 2022-09-20 ENCOUNTER — HOSPITAL ENCOUNTER (INPATIENT)
Facility: HOSPITAL | Age: 72
LOS: 1 days | Discharge: LEFT BEFORE DISCHARGE | DRG: 641 | End: 2022-09-22
Attending: STUDENT IN AN ORGANIZED HEALTH CARE EDUCATION/TRAINING PROGRAM | Admitting: STUDENT IN AN ORGANIZED HEALTH CARE EDUCATION/TRAINING PROGRAM
Payer: COMMERCIAL
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
## 2022-09-20 DIAGNOSIS — R26.2 AMBULATORY DYSFUNCTION: ICD-10-CM

## 2022-09-20 PROBLEM — N18.4 STAGE 4 CHRONIC KIDNEY DISEASE (HCC): Chronic | Status: ACTIVE | Noted: 2022-09-20

## 2022-09-20 PROBLEM — D63.1 ANEMIA DUE TO CHRONIC KIDNEY DISEASE: Chronic | Status: ACTIVE | Noted: 2022-09-20

## 2022-09-20 PROBLEM — U07.1 COVID-19 VIRUS INFECTION: Status: ACTIVE | Noted: 2022-09-20

## 2022-09-20 PROBLEM — Z79.4 CONTROLLED TYPE 2 DIABETES MELLITUS WITH DIABETIC NEUROPATHY, WITH LONG-TERM CURRENT USE OF INSULIN (HCC): Chronic | Status: ACTIVE | Noted: 2022-09-20

## 2022-09-20 PROBLEM — E11.40 CONTROLLED TYPE 2 DIABETES MELLITUS WITH DIABETIC NEUROPATHY, WITH LONG-TERM CURRENT USE OF INSULIN (HCC): Chronic | Status: ACTIVE | Noted: 2022-09-20

## 2022-09-20 PROBLEM — N18.9 ANEMIA DUE TO CHRONIC KIDNEY DISEASE: Chronic | Status: ACTIVE | Noted: 2022-09-20

## 2022-09-20 PROBLEM — Z85.51 HISTORY OF BLADDER CANCER: Chronic | Status: ACTIVE | Noted: 2022-09-20

## 2022-09-20 PROBLEM — Z94.4 HISTORY OF LIVER TRANSPLANT (HCC): Chronic | Status: ACTIVE | Noted: 2022-09-20

## 2022-09-20 PROBLEM — Z86.73 HISTORY OF CVA (CEREBROVASCULAR ACCIDENT): Chronic | Status: ACTIVE | Noted: 2022-09-20

## 2022-09-20 PROBLEM — R29.6 MULTIPLE FALLS: Status: ACTIVE | Noted: 2022-09-20

## 2022-09-20 PROBLEM — D69.6 THROMBOCYTOPENIA (HCC): Chronic | Status: ACTIVE | Noted: 2022-09-20

## 2022-09-20 LAB
2HR DELTA HS TROPONIN: -1 NG/L
2HR DELTA HS TROPONIN: -1 NG/L
4HR DELTA HS TROPONIN: 1 NG/L
4HR DELTA HS TROPONIN: 1 NG/L
ABO GROUP BLD: NORMAL
AMMONIA PLAS-SCNC: 23 UMOL/L (ref 11–35)
AMMONIA PLAS-SCNC: 23 UMOL/L (ref 11–35)
ANION GAP SERPL CALCULATED.3IONS-SCNC: 4 MMOL/L (ref 4–13)
ANION GAP SERPL CALCULATED.3IONS-SCNC: 4 MMOL/L (ref 4–13)
APTT PPP: 29 SECONDS (ref 23–37)
APTT PPP: 29 SECONDS (ref 23–37)
ATRIAL RATE: 78 BPM
ATRIAL RATE: 78 BPM
BACTERIA UR QL AUTO: ABNORMAL /HPF
BACTERIA UR QL AUTO: ABNORMAL /HPF
BASOPHILS # BLD AUTO: 0.02 THOUSANDS/ΜL (ref 0–0.1)
BASOPHILS # BLD AUTO: 0.02 THOUSANDS/ΜL (ref 0–0.1)
BASOPHILS NFR BLD AUTO: 0 % (ref 0–1)
BASOPHILS NFR BLD AUTO: 0 % (ref 0–1)
BILIRUB UR QL STRIP: NEGATIVE
BILIRUB UR QL STRIP: NEGATIVE
BLD GP AB SCN SERPL QL: NEGATIVE
BLD GP AB SCN SERPL QL: NEGATIVE
BUN SERPL-MCNC: 33 MG/DL (ref 5–25)
BUN SERPL-MCNC: 33 MG/DL (ref 5–25)
CALCIUM SERPL-MCNC: 8.5 MG/DL (ref 8.3–10.1)
CALCIUM SERPL-MCNC: 8.5 MG/DL (ref 8.3–10.1)
CARDIAC TROPONIN I PNL SERPL HS: 7 NG/L
CARDIAC TROPONIN I PNL SERPL HS: 7 NG/L
CARDIAC TROPONIN I PNL SERPL HS: 8 NG/L
CARDIAC TROPONIN I PNL SERPL HS: 8 NG/L
CARDIAC TROPONIN I PNL SERPL HS: 9 NG/L
CARDIAC TROPONIN I PNL SERPL HS: 9 NG/L
CHLORIDE SERPL-SCNC: 108 MMOL/L (ref 96–108)
CHLORIDE SERPL-SCNC: 108 MMOL/L (ref 96–108)
CLARITY UR: CLEAR
CLARITY UR: CLEAR
CO2 SERPL-SCNC: 26 MMOL/L (ref 21–32)
CO2 SERPL-SCNC: 26 MMOL/L (ref 21–32)
COLOR UR: COLORLESS
COLOR UR: COLORLESS
CREAT SERPL-MCNC: 2.28 MG/DL (ref 0.6–1.3)
CREAT SERPL-MCNC: 2.28 MG/DL (ref 0.6–1.3)
EOSINOPHIL # BLD AUTO: 0.44 THOUSAND/ΜL (ref 0–0.61)
EOSINOPHIL # BLD AUTO: 0.44 THOUSAND/ΜL (ref 0–0.61)
EOSINOPHIL NFR BLD AUTO: 7 % (ref 0–6)
EOSINOPHIL NFR BLD AUTO: 7 % (ref 0–6)
ERYTHROCYTE [DISTWIDTH] IN BLOOD BY AUTOMATED COUNT: 16.2 % (ref 11.6–15.1)
ERYTHROCYTE [DISTWIDTH] IN BLOOD BY AUTOMATED COUNT: 16.2 % (ref 11.6–15.1)
GFR SERPL CREATININE-BSD FRML MDRD: 27 ML/MIN/1.73SQ M
GFR SERPL CREATININE-BSD FRML MDRD: 27 ML/MIN/1.73SQ M
GLUCOSE SERPL-MCNC: 103 MG/DL (ref 65–140)
GLUCOSE SERPL-MCNC: 103 MG/DL (ref 65–140)
GLUCOSE SERPL-MCNC: 171 MG/DL (ref 65–140)
GLUCOSE SERPL-MCNC: 171 MG/DL (ref 65–140)
GLUCOSE SERPL-MCNC: 87 MG/DL (ref 65–140)
GLUCOSE SERPL-MCNC: 87 MG/DL (ref 65–140)
GLUCOSE UR STRIP-MCNC: NEGATIVE MG/DL
GLUCOSE UR STRIP-MCNC: NEGATIVE MG/DL
HCT VFR BLD AUTO: 27.7 % (ref 36.5–49.3)
HCT VFR BLD AUTO: 27.7 % (ref 36.5–49.3)
HGB BLD-MCNC: 8.6 G/DL (ref 12–17)
HGB BLD-MCNC: 8.6 G/DL (ref 12–17)
HGB UR QL STRIP.AUTO: NEGATIVE
HGB UR QL STRIP.AUTO: NEGATIVE
IMM GRANULOCYTES # BLD AUTO: 0.04 THOUSAND/UL (ref 0–0.2)
IMM GRANULOCYTES # BLD AUTO: 0.04 THOUSAND/UL (ref 0–0.2)
IMM GRANULOCYTES NFR BLD AUTO: 1 % (ref 0–2)
IMM GRANULOCYTES NFR BLD AUTO: 1 % (ref 0–2)
INR PPP: 1.08 (ref 0.84–1.19)
INR PPP: 1.08 (ref 0.84–1.19)
KETONES UR STRIP-MCNC: NEGATIVE MG/DL
KETONES UR STRIP-MCNC: NEGATIVE MG/DL
LEUKOCYTE ESTERASE UR QL STRIP: ABNORMAL
LEUKOCYTE ESTERASE UR QL STRIP: ABNORMAL
LYMPHOCYTES # BLD AUTO: 1.06 THOUSANDS/ΜL (ref 0.6–4.47)
LYMPHOCYTES # BLD AUTO: 1.06 THOUSANDS/ΜL (ref 0.6–4.47)
LYMPHOCYTES NFR BLD AUTO: 18 % (ref 14–44)
LYMPHOCYTES NFR BLD AUTO: 18 % (ref 14–44)
MCH RBC QN AUTO: 24.6 PG (ref 26.8–34.3)
MCH RBC QN AUTO: 24.6 PG (ref 26.8–34.3)
MCHC RBC AUTO-ENTMCNC: 31 G/DL (ref 31.4–37.4)
MCHC RBC AUTO-ENTMCNC: 31 G/DL (ref 31.4–37.4)
MCV RBC AUTO: 79 FL (ref 82–98)
MCV RBC AUTO: 79 FL (ref 82–98)
MONOCYTES # BLD AUTO: 0.77 THOUSAND/ΜL (ref 0.17–1.22)
MONOCYTES # BLD AUTO: 0.77 THOUSAND/ΜL (ref 0.17–1.22)
MONOCYTES NFR BLD AUTO: 13 % (ref 4–12)
MONOCYTES NFR BLD AUTO: 13 % (ref 4–12)
NEUTROPHILS # BLD AUTO: 3.6 THOUSANDS/ΜL (ref 1.85–7.62)
NEUTROPHILS # BLD AUTO: 3.6 THOUSANDS/ΜL (ref 1.85–7.62)
NEUTS SEG NFR BLD AUTO: 61 % (ref 43–75)
NEUTS SEG NFR BLD AUTO: 61 % (ref 43–75)
NITRITE UR QL STRIP: NEGATIVE
NITRITE UR QL STRIP: NEGATIVE
NON-SQ EPI CELLS URNS QL MICRO: ABNORMAL /HPF
NON-SQ EPI CELLS URNS QL MICRO: ABNORMAL /HPF
NRBC BLD AUTO-RTO: 0 /100 WBCS
NRBC BLD AUTO-RTO: 0 /100 WBCS
P AXIS: 50 DEGREES
P AXIS: 50 DEGREES
PH UR STRIP.AUTO: 7 [PH]
PH UR STRIP.AUTO: 7 [PH]
PLATELET # BLD AUTO: 142 THOUSANDS/UL (ref 149–390)
PLATELET # BLD AUTO: 142 THOUSANDS/UL (ref 149–390)
PMV BLD AUTO: 11.2 FL (ref 8.9–12.7)
PMV BLD AUTO: 11.2 FL (ref 8.9–12.7)
POTASSIUM SERPL-SCNC: 4.3 MMOL/L (ref 3.5–5.3)
POTASSIUM SERPL-SCNC: 4.3 MMOL/L (ref 3.5–5.3)
PR INTERVAL: 150 MS
PR INTERVAL: 150 MS
PROT UR STRIP-MCNC: ABNORMAL MG/DL
PROT UR STRIP-MCNC: ABNORMAL MG/DL
PROTHROMBIN TIME: 14.2 SECONDS (ref 11.6–14.5)
PROTHROMBIN TIME: 14.2 SECONDS (ref 11.6–14.5)
QRS AXIS: 4 DEGREES
QRS AXIS: 4 DEGREES
QRSD INTERVAL: 72 MS
QRSD INTERVAL: 72 MS
QT INTERVAL: 366 MS
QT INTERVAL: 366 MS
QTC INTERVAL: 417 MS
QTC INTERVAL: 417 MS
RBC # BLD AUTO: 3.5 MILLION/UL (ref 3.88–5.62)
RBC # BLD AUTO: 3.5 MILLION/UL (ref 3.88–5.62)
RBC #/AREA URNS AUTO: ABNORMAL /HPF
RBC #/AREA URNS AUTO: ABNORMAL /HPF
RH BLD: POSITIVE
SODIUM SERPL-SCNC: 138 MMOL/L (ref 135–147)
SODIUM SERPL-SCNC: 138 MMOL/L (ref 135–147)
SP GR UR STRIP.AUTO: 1.01 (ref 1–1.03)
SP GR UR STRIP.AUTO: 1.01 (ref 1–1.03)
SPECIMEN EXPIRATION DATE: NORMAL
SPECIMEN EXPIRATION DATE: NORMAL
T WAVE AXIS: 39 DEGREES
T WAVE AXIS: 39 DEGREES
TACROLIMUS BLD-MCNC: 2.4 NG/ML (ref 2–20)
TACROLIMUS BLD-MCNC: 2.4 NG/ML (ref 2–20)
UROBILINOGEN UR STRIP-ACNC: <2 MG/DL
UROBILINOGEN UR STRIP-ACNC: <2 MG/DL
VENTRICULAR RATE: 78 BPM
VENTRICULAR RATE: 78 BPM
WBC # BLD AUTO: 5.93 THOUSAND/UL (ref 4.31–10.16)
WBC # BLD AUTO: 5.93 THOUSAND/UL (ref 4.31–10.16)
WBC #/AREA URNS AUTO: ABNORMAL /HPF
WBC #/AREA URNS AUTO: ABNORMAL /HPF

## 2022-09-20 PROCEDURE — 85610 PROTHROMBIN TIME: CPT | Performed by: EMERGENCY MEDICINE

## 2022-09-20 PROCEDURE — NC001 PR NO CHARGE: Performed by: EMERGENCY MEDICINE

## 2022-09-20 PROCEDURE — 80048 BASIC METABOLIC PNL TOTAL CA: CPT | Performed by: STUDENT IN AN ORGANIZED HEALTH CARE EDUCATION/TRAINING PROGRAM

## 2022-09-20 PROCEDURE — 99220 PR INITIAL OBSERVATION CARE/DAY 70 MINUTES: CPT | Performed by: INTERNAL MEDICINE

## 2022-09-20 PROCEDURE — 86901 BLOOD TYPING SEROLOGIC RH(D): CPT | Performed by: EMERGENCY MEDICINE

## 2022-09-20 PROCEDURE — 81001 URINALYSIS AUTO W/SCOPE: CPT | Performed by: EMERGENCY MEDICINE

## 2022-09-20 PROCEDURE — 93005 ELECTROCARDIOGRAM TRACING: CPT

## 2022-09-20 PROCEDURE — 83735 ASSAY OF MAGNESIUM: CPT | Performed by: STUDENT IN AN ORGANIZED HEALTH CARE EDUCATION/TRAINING PROGRAM

## 2022-09-20 PROCEDURE — 82140 ASSAY OF AMMONIA: CPT | Performed by: EMERGENCY MEDICINE

## 2022-09-20 PROCEDURE — 85025 COMPLETE CBC W/AUTO DIFF WBC: CPT | Performed by: EMERGENCY MEDICINE

## 2022-09-20 PROCEDURE — 93308 TTE F-UP OR LMTD: CPT | Performed by: EMERGENCY MEDICINE

## 2022-09-20 PROCEDURE — 82948 REAGENT STRIP/BLOOD GLUCOSE: CPT

## 2022-09-20 PROCEDURE — 36415 COLL VENOUS BLD VENIPUNCTURE: CPT | Performed by: EMERGENCY MEDICINE

## 2022-09-20 PROCEDURE — 86850 RBC ANTIBODY SCREEN: CPT | Performed by: EMERGENCY MEDICINE

## 2022-09-20 PROCEDURE — 93010 ELECTROCARDIOGRAM REPORT: CPT | Performed by: INTERNAL MEDICINE

## 2022-09-20 PROCEDURE — 85730 THROMBOPLASTIN TIME PARTIAL: CPT | Performed by: EMERGENCY MEDICINE

## 2022-09-20 PROCEDURE — 72125 CT NECK SPINE W/O DYE: CPT

## 2022-09-20 PROCEDURE — 80197 ASSAY OF TACROLIMUS: CPT | Performed by: EMERGENCY MEDICINE

## 2022-09-20 PROCEDURE — 86900 BLOOD TYPING SEROLOGIC ABO: CPT | Performed by: EMERGENCY MEDICINE

## 2022-09-20 PROCEDURE — 99285 EMERGENCY DEPT VISIT HI MDM: CPT

## 2022-09-20 PROCEDURE — 84484 ASSAY OF TROPONIN QUANT: CPT | Performed by: EMERGENCY MEDICINE

## 2022-09-20 PROCEDURE — 80048 BASIC METABOLIC PNL TOTAL CA: CPT | Performed by: EMERGENCY MEDICINE

## 2022-09-20 PROCEDURE — 84484 ASSAY OF TROPONIN QUANT: CPT | Performed by: INTERNAL MEDICINE

## 2022-09-20 PROCEDURE — 76705 ECHO EXAM OF ABDOMEN: CPT | Performed by: EMERGENCY MEDICINE

## 2022-09-20 PROCEDURE — 70450 CT HEAD/BRAIN W/O DYE: CPT

## 2022-09-20 PROCEDURE — 99219 PR INITIAL OBSERVATION CARE/DAY 50 MINUTES: CPT | Performed by: EMERGENCY MEDICINE

## 2022-09-20 RX ORDER — PHENAZOPYRIDINE HYDROCHLORIDE 100 MG/1
100 TABLET, FILM COATED ORAL 3 TIMES WEEKLY
Status: DISCONTINUED | OUTPATIENT
Start: 2022-09-21 | End: 2022-09-22 | Stop reason: HOSPADM

## 2022-09-20 RX ORDER — TAMSULOSIN HYDROCHLORIDE 0.4 MG/1
0.4 CAPSULE ORAL
Status: DISCONTINUED | OUTPATIENT
Start: 2022-09-20 | End: 2022-09-22 | Stop reason: HOSPADM

## 2022-09-20 RX ORDER — ONDANSETRON 2 MG/ML
4 INJECTION INTRAMUSCULAR; INTRAVENOUS EVERY 6 HOURS PRN
Status: DISCONTINUED | OUTPATIENT
Start: 2022-09-20 | End: 2022-09-22 | Stop reason: HOSPADM

## 2022-09-20 RX ORDER — TACROLIMUS 1 MG/1
1 CAPSULE ORAL EVERY 12 HOURS SCHEDULED
Status: DISCONTINUED | OUTPATIENT
Start: 2022-09-20 | End: 2022-09-22 | Stop reason: HOSPADM

## 2022-09-20 RX ORDER — PREGABALIN 50 MG/1
50 CAPSULE ORAL 3 TIMES DAILY
Status: DISCONTINUED | OUTPATIENT
Start: 2022-09-20 | End: 2022-09-22 | Stop reason: HOSPADM

## 2022-09-20 RX ORDER — OXYCODONE HYDROCHLORIDE 5 MG/1
2.5 TABLET ORAL EVERY 4 HOURS PRN
Status: DISCONTINUED | OUTPATIENT
Start: 2022-09-20 | End: 2022-09-22 | Stop reason: HOSPADM

## 2022-09-20 RX ORDER — INSULIN LISPRO 100 [IU]/ML
1-5 INJECTION, SOLUTION INTRAVENOUS; SUBCUTANEOUS
Status: DISCONTINUED | OUTPATIENT
Start: 2022-09-20 | End: 2022-09-22 | Stop reason: HOSPADM

## 2022-09-20 RX ORDER — SODIUM CHLORIDE, SODIUM GLUCONATE, SODIUM ACETATE, POTASSIUM CHLORIDE, MAGNESIUM CHLORIDE, SODIUM PHOSPHATE, DIBASIC, AND POTASSIUM PHOSPHATE .53; .5; .37; .037; .03; .012; .00082 G/100ML; G/100ML; G/100ML; G/100ML; G/100ML; G/100ML; G/100ML
100 INJECTION, SOLUTION INTRAVENOUS CONTINUOUS
Status: DISCONTINUED | OUTPATIENT
Start: 2022-09-20 | End: 2022-09-22 | Stop reason: HOSPADM

## 2022-09-20 RX ORDER — HEPARIN SODIUM 5000 [USP'U]/ML
5000 INJECTION, SOLUTION INTRAVENOUS; SUBCUTANEOUS EVERY 8 HOURS SCHEDULED
Status: DISCONTINUED | OUTPATIENT
Start: 2022-09-20 | End: 2022-09-22 | Stop reason: HOSPADM

## 2022-09-20 RX ORDER — ACETAMINOPHEN 325 MG/1
650 TABLET ORAL EVERY 6 HOURS PRN
Status: DISCONTINUED | OUTPATIENT
Start: 2022-09-20 | End: 2022-09-21

## 2022-09-20 RX ORDER — HYDROMORPHONE HCL IN WATER/PF 6 MG/30 ML
0.2 PATIENT CONTROLLED ANALGESIA SYRINGE INTRAVENOUS ONCE
Status: COMPLETED | OUTPATIENT
Start: 2022-09-20 | End: 2022-09-20

## 2022-09-20 RX ORDER — SENNOSIDES 8.6 MG
1 TABLET ORAL
Status: DISCONTINUED | OUTPATIENT
Start: 2022-09-20 | End: 2022-09-22 | Stop reason: HOSPADM

## 2022-09-20 RX ORDER — CLOPIDOGREL BISULFATE 75 MG/1
75 TABLET ORAL DAILY
Status: DISCONTINUED | OUTPATIENT
Start: 2022-09-20 | End: 2022-09-22 | Stop reason: HOSPADM

## 2022-09-20 RX ADMIN — INSULIN DETEMIR 15 UNITS: 100 INJECTION, SOLUTION SUBCUTANEOUS at 21:56

## 2022-09-20 RX ADMIN — TACROLIMUS 1 MG: 1 CAPSULE ORAL at 21:50

## 2022-09-20 RX ADMIN — CLOPIDOGREL BISULFATE 75 MG: 75 TABLET ORAL at 17:16

## 2022-09-20 RX ADMIN — PREGABALIN 50 MG: 50 CAPSULE ORAL at 21:49

## 2022-09-20 RX ADMIN — HYDROMORPHONE HYDROCHLORIDE 0.2 MG: 0.2 INJECTION, SOLUTION INTRAMUSCULAR; INTRAVENOUS; SUBCUTANEOUS at 12:44

## 2022-09-20 RX ADMIN — SODIUM CHLORIDE, SODIUM GLUCONATE, SODIUM ACETATE, POTASSIUM CHLORIDE, MAGNESIUM CHLORIDE, SODIUM PHOSPHATE, DIBASIC, AND POTASSIUM PHOSPHATE 100 ML/HR: .53; .5; .37; .037; .03; .012; .00082 INJECTION, SOLUTION INTRAVENOUS at 17:01

## 2022-09-20 RX ADMIN — INSULIN LISPRO 1 UNITS: 100 INJECTION, SOLUTION INTRAVENOUS; SUBCUTANEOUS at 22:45

## 2022-09-20 RX ADMIN — PREGABALIN 50 MG: 50 CAPSULE ORAL at 16:56

## 2022-09-20 RX ADMIN — HEPARIN SODIUM 5000 UNITS: 5000 INJECTION INTRAVENOUS; SUBCUTANEOUS at 21:53

## 2022-09-20 RX ADMIN — HEPARIN SODIUM 5000 UNITS: 5000 INJECTION INTRAVENOUS; SUBCUTANEOUS at 16:55

## 2022-09-20 RX ADMIN — TAMSULOSIN HYDROCHLORIDE 0.4 MG: 0.4 CAPSULE ORAL at 16:56

## 2022-09-20 NOTE — ASSESSMENT & PLAN NOTE
· From my review of patient's previous CBCs, chronic, and stable  · Monitor  · No active bleeding  · For further evaluation and management if this worsens significantly

## 2022-09-20 NOTE — PROCEDURES
POC FAST US    Date/Time: 9/20/2022 10:55 AM  Performed by: Sindy Garcia MD  Authorized by: Sindy Garcia MD     Patient location:  Trauma  Other Assisting Provider: No    Procedure details:     Exam Type:  Diagnostic    Indications: blunt abdominal trauma and blunt chest trauma      Assess for:  Hemothorax, intra-abdominal fluid, pericardial effusion and pneumothorax    Technique: FAST      Views obtained:  Heart - Pericardial sac, RUQ - Baig's Pouch, LUQ - Splenorenal space and Suprapubic - Pouch of Zhou    Image quality: diagnostic      Image availability:  Video obtained  FAST Findings:     RUQ (Hepatorenal) free fluid: absent      LUQ (Splenorenal) free fluid: absent      Suprapubic free fluid: absent      Cardiac wall motion: identified      Pericardial effusion: absent    Interpretation:     Impressions: negative

## 2022-09-20 NOTE — ASSESSMENT & PLAN NOTE
· Patient had 3 falls overnight  Patient and patient's wife who was with the patient, denies any loss of consciousness  Patient was awake and aware of the falls, although a little bit sleepy  · Evaluated by trauma team in the emergency room  Had CT scan of the head, cervical spine and x-rays done by the trauma team   As per their sign-out, no significant injuries  I found the patient having bruises on his right upper extremity; a small bruise on the right side of the face; a small bump without any bleeding on patient's occipital part of the head; pain on patient's dorsum of the right foot  · Every fall happened when patient was getting up from bed  According to the patient's daughter, patient has not been adequately drinking fluids  Patient also on Flomax at bedtime, as well as temazepam and Ambien  Thus likely due to orthostatic hypotension  · Orthostatic blood pressures q day  · IV fluids  · PT OT evaluation and management  · Fall precautions  · Telemetry and troponins already ordered by the ER physician  Continue  · EKG revealed normal sinus rhythm  · Pain control  · I will hold off patient's temazepam and Ambien which are both taken at bedtime  This may have contributed to patient's fall  · For further evaluation and management if patient continues to be falling and no exact cause still found

## 2022-09-20 NOTE — H&P
H&P - Trauma   Shahram Richey 74 y o  male MRN: [id number]  Unit/Bed#: ED 02 Encounter: [id number]    Trauma Alert: Level B   Model of Arrival: Ambulance    Trauma Team: Attending Mathew Reynoso   Consultants:     None     Assessment/Plan   Active Problems / Assessment:   Frequent falls   No traumatic injuries     Plan:   Admit to medicine for ambulatory dysfunction, syncope work up    History of Present Illness     Chief Complaint: frequent falls  Mechanism:Fall     HPI:    Shahram Richey is a 74 y o  male who presents with frequent ground level falls on plavix  EMS had reported that the patient had frequent ground level falls at home and goes unresponsive per spouse  Patient had cervical collar in place at time of iniitial evaluation  GCS 14 and AAOX2  Patient also reported right ankle pain at time of initial evaluation  Patient had no obvious signs of trauma  Review of Systems   Constitutional: Negative for activity change, chills and fever  HENT: Negative for congestion, facial swelling and rhinorrhea  Eyes: Negative for visual disturbance  Respiratory: Negative for chest tightness, shortness of breath and wheezing  Cardiovascular: Negative for chest pain and palpitations  Gastrointestinal: Negative for constipation, diarrhea, nausea and vomiting  Genitourinary: Negative for flank pain  Musculoskeletal: Positive for arthralgias  Negative for neck pain and neck stiffness  Neurological: Negative for dizziness, tremors, seizures and headaches  Psychiatric/Behavioral: Negative for confusion  All other systems reviewed and are negative  12-point, complete review of systems was reviewed and negative except as stated above  Historical Information     Past medical history:  Liver transplant  Hep C  Diabetes       There is no immunization history on file for this patient    Last Tetanus: unknown  Family History: Non-contributory     Meds/Allergies   all current active meds have been reviewed  Allergies have not been reviewed; Not on File    Objective   Initial Vitals:   Temperature: 98 7 °F (37 1 °C) (09/20/22 1047)  Pulse: 82 (09/20/22 1047)  Respirations: 17 (09/20/22 1047)  Blood Pressure: 135/75 (09/20/22 1047)    Primary Survey:   Airway:        Status: patent;        Pre-hospital Interventions: none        Hospital Interventions: none  Breathing:        Pre-hospital Interventions: none       Effort: normal       Right breath sounds: normal       Left breath sounds: normal  Circulation:        Rhythm: regular       Rate: regular   Right Pulses Left Pulses    R radial: 2+    R pedal: 2+     L radial: 2+    L pedal: 2+       Disability:        GCS: Eye: 4; Verbal: 4 Motor: 6 Total: 14       Right Pupil: 2 mm;  round;  reactive         Left Pupil:  2 mm;  round;  reactive      R Motor Strength L Motor Strength    R : 5/5  R dorsiflex: 5/5  R plantarflex: 5/5 L : 5/5  L dorsiflex: 5/5  L plantarflex: 5/5        Sensory:  No sensory deficit  Exposure:       Completed: Yes      Secondary Survey:  Physical Exam  Vitals and nursing note reviewed  Constitutional:       General: He is awake  Appearance: Normal appearance  He is well-developed and normal weight  Interventions: Cervical collar in place  Comments: /75   Pulse 82   Temp 98 7 °F (37 1 °C) (Tympanic)   Resp 17   Wt 59 5 kg (131 lb 2 8 oz)   SpO2 99%      HENT:      Head: Normocephalic and atraumatic  Jaw: There is normal jaw occlusion  Right Ear: Hearing and external ear normal       Left Ear: Hearing and external ear normal       Nose: Nose normal       Mouth/Throat:      Lips: Pink  Mouth: Mucous membranes are moist       Pharynx: Oropharynx is clear  Eyes:      General: Lids are normal  Vision grossly intact  Extraocular Movements: Extraocular movements intact  Conjunctiva/sclera: Conjunctivae normal       Pupils: Pupils are equal, round, and reactive to light     Neck: Trachea: Trachea and phonation normal    Cardiovascular:      Rate and Rhythm: Normal rate and regular rhythm  Pulses: Normal pulses  Radial pulses are 2+ on the right side and 2+ on the left side  Dorsalis pedis pulses are 2+ on the right side and 2+ on the left side  Heart sounds: Normal heart sounds  Pulmonary:      Effort: Pulmonary effort is normal  No respiratory distress  Breath sounds: Normal breath sounds and air entry  No wheezing, rhonchi or rales  Abdominal:      General: Abdomen is flat  There is no distension  Palpations: Abdomen is soft  Tenderness: There is no abdominal tenderness  There is no guarding or rebound  Musculoskeletal:         General: Normal range of motion  Cervical back: Full passive range of motion without pain, normal range of motion and neck supple  No tenderness  Right ankle: Tenderness present  Comments: No midline C, T, or L-spine tenderness, step-offs, or deformities  Right ankle tenderness, able to move with full ROM   Skin:     General: Skin is warm  Capillary Refill: Capillary refill takes less than 2 seconds  Neurological:      General: No focal deficit present  Mental Status: He is alert  Mental status is at baseline  GCS: GCS eye subscore is 4  GCS verbal subscore is 4  GCS motor subscore is 6  Cranial Nerves: Cranial nerves are intact  No cranial nerve deficit  Sensory: No sensory deficit  Motor: No weakness  Psychiatric:         Mood and Affect: Mood normal          Behavior: Behavior is cooperative  Thought Content: Thought content normal          Invasive Devices  Report    None               Lab Results: Results: I have personally reviewed all pertinent laboratory/tests results    Imaging Results: I have personally reviewed pertinent reports      Chest Xray(s): negative for acute findings   FAST exam(s): negative for acute findings   CT Scan(s): negative for acute findings   Additional Xray(s): negative for acute findings         Code Status: No Order  Advance Directive and Living Will:      Power of :    POLST:    I have spent 30 minutes with Patient  today in which greater than 50% of this time was spent in counseling/coordination of care regarding Diagnostic results, Intructions for management and Impressions

## 2022-09-20 NOTE — ASSESSMENT & PLAN NOTE
Lab Results   Component Value Date    EGFR 27 09/20/2022    CREATININE 2 28 (H) 09/20/2022     · From my review of patient's previous BMPs, stable, and likely baseline serum creatinine is around 2 4 to 2 7   · Monitor  · Avoid nephrotoxins  · Avoid hypotension  · Input and output monitoring  · For further evaluation and management if this worsens significantly

## 2022-09-20 NOTE — ED PROVIDER NOTES
Emergency Department Airway Evaluation and Management Form    History  Obtained from: EMS  Review of patient's allergies indicates no known allergies  Chief Complaint:  Trauma Alert    HPI: Pt is a 74 y o  male presents s/p fall (multiple) on plavix  With LOC per wife  unlear reason for falls      I have reviewed and agree with the history as documented  Physical Exam    Vitals:    09/20/22 1047   BP: 135/75   Pulse: 82   Resp: 17   Temp: 98 7 °F (37 1 °C)   SpO2: 99%     Supplemental Oxygen:none    GCS: 15    Neuro: Alert and oriented  Psych: not combative, not anxious, cooperative for exam  Neck: In collar, No JVD, No midline tenderness  Cardio:  Normal  Respiratory: Normal  Mouth:  Normal  Pharynx: Normal    Monitor:  NSR      ED Medications    No current facility-administered medications for this encounter  No current outpatient medications on file        Intubation    No intubation required    Final Diagnosis:  Closed head injury on AP    ED Provider  Electronically Signed by         Hannah Lutz MD  09/20/22 6480

## 2022-09-20 NOTE — CASE MANAGEMENT
Case Management Progress Note    Patient name Jayleen Amato  Location ED 02/ED 02 MRN [id number]  : 1950 Date 2022       LOS (days): 0  Geometric Mean LOS (GMLOS) (days):   Days to GMLOS:        OBJECTIVE:        Current admission status: Emergency  Preferred Pharmacy: No Pharmacies Listed  Primary Care Provider: Zach Easley DO    Primary Insurance: [facility]  Secondary Insurance:     PROGRESS NOTE:    CM responded to trauma alert  Pt was brought to the ED via [facility], s/p multiple falls at home  apartment

## 2022-09-20 NOTE — H&P
[facility]  H&P- Yonathan Tobias 1950, 74 y o  male MRN: [id number]  Unit/Bed#: ED 02 Encounter: [id number]  Primary Care Provider: Lalit Florentino DO   Date and time admitted to hospital: 9/20/2022 10:46 AM    * Multiple falls  Assessment & Plan  · Patient had 3 falls overnight  Patient and patient's wife who was with the patient, denies any loss of consciousness  Patient was awake and aware of the falls, although a little bit sleepy  · Evaluated by trauma team in the emergency room  Had CT scan of the head, cervical spine and x-rays done by the trauma team   As per their sign-out, no significant injuries  I found the patient having bruises on his right upper extremity; a small bruise on the right side of the face; a small bump without any bleeding on patient's occipital part of the head; pain on patient's dorsum of the right foot  · Every fall happened when patient was getting up from bed  According to the patient's daughter, patient has not been adequately drinking fluids  Thus likely due to orthostatic hypotension  · Orthostatic blood pressures q day  · IV fluids  · PT OT evaluation and management  · Fall precautions  · Telemetry and troponins already ordered by the ER physician  Continue  · EKG revealed normal sinus rhythm  · Pain control  · For further evaluation and management if patient continues to be falling and no exact cause still found  COVID-19 virus infection  Assessment & Plan  · Diagnosed, 9/12 or 8 days ago  · Presently asymptomatic  History of CVA (cerebrovascular accident)  Assessment & Plan  · Continue Plavix  · Not on atorvastatin due to allergy  Controlled type 2 diabetes mellitus with diabetic neuropathy, with long-term current use of insulin ([facility] )  Assessment & Plan  No results found for: HGBA1C    No results for input(s): POCGLU in the last 72 hours      Blood Sugar Average: Last 72 hrs:    · Continue patient's Levemir at 15 units at bedtime  · Insulin sliding scale  · Hypoglycemia protocol  · Diabetic diet  · Adjust treatment accordingly  History of liver transplant ([facility] )  Assessment & Plan  · Continue tacrolimus  · Check CMP  History of bladder cancer  Assessment & Plan  · Follows with oncologist and palliative care specialist   · Had chemotherapy and radiation treatment  · History of right hydroureteronephrosis, with right nephrostomy tube  Thrombocytopenia (HCC)  Assessment & Plan  · Mild  · From my review of patient's previous CBCs, chronic  · Stable and better  · No active bleeding  · For further evaluation and management if this worsens significantly  Anemia due to chronic kidney disease  Assessment & Plan  · From my review of patient's previous CBCs, chronic, and stable  · Monitor  · No active bleeding  · For further evaluation and management if this worsens significantly  Stage 4 chronic kidney disease [facility])  Assessment & Plan  Lab Results   Component Value Date    EGFR 27 09/20/2022    CREATININE 2 28 (H) 09/20/2022     · From my review of patient's previous BMPs, stable, and likely baseline serum creatinine is around 2 4 to 2 7   · Monitor  · Avoid nephrotoxins  · Avoid hypotension  · Input and output monitoring  · For further evaluation and management if this worsens significantly  VTE Pharmacologic Prophylaxis: VTE Score: 3 Moderate Risk (Score 3-4) - Pharmacological DVT Prophylaxis Ordered: heparin  Code Status:  Level 3, DNR DNI  Discussed with patient and patient's wife  Discussion with family: Updated  (wife and daughter) at bedside  Anticipated Length of Stay: Patient will be admitted on an observation basis with an anticipated length of stay of less than 2 midnights secondary to Above findings and plans       Total Time for Visit, including Counseling / Coordination of Care: 70 minutes Greater than 50% of this total time spent on direct patient counseling and coordination of care  Chief Complaint:  Falls    History of Present Illness:  Yonathan Tobias is a 74 y o  male with a PMH significant for chronic kidney disease stage 4, anemia, thrombocytopenia, liver cirrhosis status post liver transplant, bladder cancer and had chemotherapy and radiation treatment, right hydroureteronephrosis with nephrostomy tube who presents with falls at home that happened overnight  According to the patient and patient's wife at bedside, overnight, patient fell 3 times  Every fall happened when patient was getting up from his bed  On those occasions, patient needed to go to the toilet  When asked, both the patient and patient's wife, denies that patient had loss of consciousness  According to the patient, he knew that he fell  Patient was just mildly sleepy on those occasions  Due to the falls, patient complains of pains on his right upper extremity where he sustained bruises/abrasions; pain on his right foot; a small bruise on his right side of the face; a small bump on his occipital head area  Patient does not have any active bleeding at the time I saw him  Patient denies any other symptoms other the ones mentioned above  Patient also was recently diagnosed with COVID-19 infection, on 09/12  Patient is presently asymptomatic as far as this is concerned  Patient denies any fever, chills, shortness of breath or any cough or any nausea, vomiting or diarrhea  Patient's wife admitted to me, that has not been drinking adequate amounts of fluids every day  Patient was evaluated by the trauma team as patient was on Plavix and was recommended admission by our service for evaluation of possible syncope  CT scan of the head and cervical spine as well as several x-rays were done by the trauma team   As per Trauma Service, no significant injuries        Review of Systems:  Review of Systems     Ten point review systems done and they were negative except for the ones I mentioned in my history of present illness  Patient denies any headaches, dizziness or any loss of consciousness  Patient denies any fever, chills or any cough or colds  Patient denies any nausea, vomiting or any abdominal pains  Patient denies any urinary symptoms or any bowel movement problems  Past Medical and Surgical History:   Past Medical History:   Diagnosis Date    Anemia     Bladder cancer ([facility] )     Hydronephrosis of right kidney     Liver cirrhosis ([facility] )     Liver transplant status ([place] [id number] )     Renal disorder     Stroke ([place] [address] )     Thrombocytopenia ([facility] )        Past Surgical History:   Procedure Laterality Date    HEPATITIS B SURFACE AB QN,LIVER TRANSPLANT (HISTORICAL)         Meds/Allergies:  Prior to Admission medications    Not on File     I have reviewed home medications with a medical source (PCP, Pharmacy, other)  I have reviewed patient's home medications with the note from the nephrologist     Patient takes Plavix 75 mg once a day; Levemir 15 units at bedtime; Glucerna 3 times a day; Pyridium 100 mg Mondays, Wednesdays and Fridays; Lyrica 50 mg 3 times a day; Prograf 1 mg twice a day; Flomax 0 4 mg at bedtime; Restoril 30 mg at bedtime; Ambien 10 mg at bedtime; triamcinolone cream twice a day; normal saline flush 10 mL once a day for the nephrostomy tube  Allergies: Allergies   Allergen Reactions    Gatifloxacin Rash    Lipitor [Atorvastatin] Other (See Comments)     Rash and itching    Ciprofloxacin Rash    Iohexol Other (See Comments)     Unknown   Iv Contrast [Iodinated Diagnostic Agents] Other (See Comments)     Unknown    Levofloxacin Other (See Comments)     Unknown  Patient's allergy list was taken from patient's other chart      Social History:  Marital Status: Unknown      Social History     Substance and Sexual Activity   Alcohol Use None     Social History     Tobacco Use   Smoking Status Not on file   Smokeless Tobacco Not on file     Social History     Substance and Sexual Activity   Drug Use Not on file       Family History:  History reviewed  No pertinent family history  Physical Exam:     Vitals:   Blood Pressure: 131/71 (09/20/22 1452)  Pulse: 74 (09/20/22 1452)  Temperature: 98 7 °F (37 1 °C) (09/20/22 1047)  Temp Source: Tympanic (09/20/22 1047)  Respirations: (!) 23 (09/20/22 1452)  Weight - Scale: 59 5 kg (131 lb 2 8 oz) (09/20/22 1051)  SpO2: 99 % (09/20/22 1452)    Physical Exam  Vitals and nursing note reviewed  Exam conducted with a chaperone present  Constitutional:       General: He is not in acute distress  Appearance: He is not ill-appearing, toxic-appearing or diaphoretic  HENT:      Head: Normocephalic  Comments: Positive for a small lump on patient's occipital area, that he sustained from the fall, no bleeding  Eyes:      General: No scleral icterus  Right eye: No discharge  Left eye: No discharge  Cardiovascular:      Rate and Rhythm: Normal rate and regular rhythm  Heart sounds: Normal heart sounds  Pulmonary:      Effort: Pulmonary effort is normal  No respiratory distress  Breath sounds: Normal breath sounds  Abdominal:      General: Bowel sounds are normal  There is no distension  Palpations: Abdomen is soft  Tenderness: There is no abdominal tenderness  There is no guarding  Musculoskeletal:         General: Tenderness present  Right lower leg: No edema  Left lower leg: No edema  Comments: Positive for tenderness on palpation of the dorsum of the right foot  Skin:     General: Skin is warm and dry  Coloration: Skin is not pale  Findings: Bruising present  No erythema or rash  Comments: Positive for a small bruise on patient's right side of the face; positive for bruises/abrasions on patient's right upper extremity  Neurological:      General: No focal deficit present  Mental Status: He is alert and oriented to person, place, and time   Mental status is at baseline  Psychiatric:         Mood and Affect: Mood normal          Behavior: Behavior normal          Thought Content: Thought content normal               Additional Data:     Lab Results:  Results from last 7 days   Lab Units 09/20/22  1210   WBC Thousand/uL 5 93   HEMOGLOBIN g/dL 8 6*   HEMATOCRIT % 27 7*   PLATELETS Thousands/uL 142*   NEUTROS PCT % 61   LYMPHS PCT % 18   MONOS PCT % 13*   EOS PCT % 7*     Results from last 7 days   Lab Units 09/20/22  1210   SODIUM mmol/L 138   POTASSIUM mmol/L 4 3   CHLORIDE mmol/L 108   CO2 mmol/L 26   BUN mg/dL 33*   CREATININE mg/dL 2 28*   ANION GAP mmol/L 4   CALCIUM mg/dL 8 5   GLUCOSE RANDOM mg/dL 87     Results from last 7 days   Lab Units 09/20/22  1210   INR  1 08                   Imaging: Reviewed radiology reports from this admission including: chest xray, CT head, xray(s) and CT scan of the cervical spine  TRAUMA - CT head wo contrast   Final Result by Gayatri Dewey MD (09/20 1128)      No acute intracranial abnormality  Chronic microangiopathic changes  I personally discussed this study with David Harmon on 9/20/2022 at 11:28 AM                            Workstation performed: [id number]         TRAUMA - CT spine cervical wo contrast   Final Result by Gayatri Dewey MD (09/20 1128)      No cervical spine fracture or traumatic malalignment  I personally discussed this study with David Harmon on 9/20/2022 at 11:28 AM                                Workstation performed: [id number]         XR trauma multiple   Final Result by Julia Sanchez MD (09/20 6888)      No acute cardiopulmonary disease within limitations of supine imaging  No acute osseous abnormality of the ankle, tibia, or fibula                 Workstation performed: [id number]         XR chest 1 view    (Results Pending)   XR ankle 2 vw right    (Results Pending)   XR tibia fibula 2 vw right    (Results Pending)       EKG and Other Studies Reviewed on Admission: · EKG: NSR  HR Seventy-eight per minute  ** Please Note: This note has been constructed using a voice recognition system   **

## 2022-09-20 NOTE — ASSESSMENT & PLAN NOTE
No results found for: HGBA1C    No results for input(s): POCGLU in the last 72 hours  Blood Sugar Average: Last 72 hrs:    · Continue patient's Levemir at 15 units at bedtime  · Insulin sliding scale  · Hypoglycemia protocol  · Diabetic diet  · Adjust treatment accordingly

## 2022-09-20 NOTE — TRAUMA DOCUMENTATION
R s/c PAC accessed with 19 g x 1 inch needle using sterile technique  Good blood return, flushes freely

## 2022-09-20 NOTE — ASSESSMENT & PLAN NOTE
· Mild  · From my review of patient's previous CBCs, chronic  · Stable and better  · No active bleeding  · For further evaluation and management if this worsens significantly

## 2022-09-20 NOTE — TELEPHONE ENCOUNTER
Reminder appt call     Patient is scheduled on 09/21//2022 @ 1030 am with an arrival time  1015 am in the [place] location with Dr Urmila Schuster  Left message in Chinese

## 2022-09-20 NOTE — ASSESSMENT & PLAN NOTE
· Follows with oncologist and palliative care specialist   · Had chemotherapy and radiation treatment  · History of right hydroureteronephrosis, with right nephrostomy tube

## 2022-09-20 NOTE — TRAUMA DOCUMENTATION
Pt refusing IV access at this time  He reports he is a difficult stick and that is why he has the port  Per trauma okay to wait until after CT to access pt's port

## 2022-09-20 NOTE — TELEPHONE ENCOUNTER
Pt's daughter called stating that Pt fell 3 times last night and is being taken via EMS to the hospital for evaluation  She also requests PCP contact her mother for additional instructions at [phone]  Please advise

## 2022-09-21 ENCOUNTER — APPOINTMENT (OUTPATIENT)
Dept: RADIOLOGY | Facility: HOSPITAL | Age: 72
DRG: 641 | End: 2022-09-21
Payer: COMMERCIAL

## 2022-09-21 ENCOUNTER — PATIENT OUTREACH (OUTPATIENT)
Dept: HEMATOLOGY ONCOLOGY | Facility: CLINIC | Age: 72
End: 2022-09-21

## 2022-09-21 ENCOUNTER — HOME HEALTH ADMISSION (OUTPATIENT)
Dept: HOME HEALTH SERVICES | Facility: HOME HEALTHCARE | Age: 72
End: 2022-09-21

## 2022-09-21 PROBLEM — M25.571 RIGHT ANKLE PAIN: Status: ACTIVE | Noted: 2022-09-21

## 2022-09-21 PROBLEM — I95.1 ORTHOSTATIC HYPOTENSION: Status: ACTIVE | Noted: 2022-09-21

## 2022-09-21 PROBLEM — N13.30 HYDRONEPHROSIS: Chronic | Status: ACTIVE | Noted: 2022-09-21

## 2022-09-21 PROBLEM — Z86.16 HISTORY OF COVID-19: Status: ACTIVE | Noted: 2022-09-20

## 2022-09-21 LAB
ALBUMIN SERPL BCP-MCNC: 2.6 G/DL (ref 3.5–5)
ALBUMIN SERPL BCP-MCNC: 2.6 G/DL (ref 3.5–5)
ALP SERPL-CCNC: 71 U/L (ref 46–116)
ALP SERPL-CCNC: 71 U/L (ref 46–116)
ALT SERPL W P-5'-P-CCNC: 18 U/L (ref 12–78)
ALT SERPL W P-5'-P-CCNC: 18 U/L (ref 12–78)
ANION GAP SERPL CALCULATED.3IONS-SCNC: 4 MMOL/L (ref 4–13)
ANION GAP SERPL CALCULATED.3IONS-SCNC: 4 MMOL/L (ref 4–13)
ANION GAP SERPL CALCULATED.3IONS-SCNC: 5 MMOL/L (ref 4–13)
ANION GAP SERPL CALCULATED.3IONS-SCNC: 5 MMOL/L (ref 4–13)
AST SERPL W P-5'-P-CCNC: 18 U/L (ref 5–45)
AST SERPL W P-5'-P-CCNC: 18 U/L (ref 5–45)
ATRIAL RATE: 79 BPM
ATRIAL RATE: 79 BPM
ATRIAL RATE: 82 BPM
ATRIAL RATE: 82 BPM
BILIRUB SERPL-MCNC: 0.3 MG/DL (ref 0.2–1)
BILIRUB SERPL-MCNC: 0.3 MG/DL (ref 0.2–1)
BUN SERPL-MCNC: 30 MG/DL (ref 5–25)
BUN SERPL-MCNC: 30 MG/DL (ref 5–25)
BUN SERPL-MCNC: 31 MG/DL (ref 5–25)
BUN SERPL-MCNC: 31 MG/DL (ref 5–25)
CALCIUM ALBUM COR SERPL-MCNC: 9.4 MG/DL (ref 8.3–10.1)
CALCIUM ALBUM COR SERPL-MCNC: 9.4 MG/DL (ref 8.3–10.1)
CALCIUM SERPL-MCNC: 8.2 MG/DL (ref 8.3–10.1)
CALCIUM SERPL-MCNC: 8.2 MG/DL (ref 8.3–10.1)
CALCIUM SERPL-MCNC: 8.3 MG/DL (ref 8.3–10.1)
CALCIUM SERPL-MCNC: 8.3 MG/DL (ref 8.3–10.1)
CHLORIDE SERPL-SCNC: 105 MMOL/L (ref 96–108)
CHLORIDE SERPL-SCNC: 105 MMOL/L (ref 96–108)
CHLORIDE SERPL-SCNC: 106 MMOL/L (ref 96–108)
CHLORIDE SERPL-SCNC: 106 MMOL/L (ref 96–108)
CO2 SERPL-SCNC: 26 MMOL/L (ref 21–32)
CO2 SERPL-SCNC: 26 MMOL/L (ref 21–32)
CO2 SERPL-SCNC: 27 MMOL/L (ref 21–32)
CO2 SERPL-SCNC: 27 MMOL/L (ref 21–32)
CREAT SERPL-MCNC: 2.2 MG/DL (ref 0.6–1.3)
CREAT SERPL-MCNC: 2.2 MG/DL (ref 0.6–1.3)
CREAT SERPL-MCNC: 2.24 MG/DL (ref 0.6–1.3)
CREAT SERPL-MCNC: 2.24 MG/DL (ref 0.6–1.3)
ERYTHROCYTE [DISTWIDTH] IN BLOOD BY AUTOMATED COUNT: 15.9 % (ref 11.6–15.1)
ERYTHROCYTE [DISTWIDTH] IN BLOOD BY AUTOMATED COUNT: 15.9 % (ref 11.6–15.1)
GFR SERPL CREATININE-BSD FRML MDRD: 27 ML/MIN/1.73SQ M
GFR SERPL CREATININE-BSD FRML MDRD: 27 ML/MIN/1.73SQ M
GFR SERPL CREATININE-BSD FRML MDRD: 28 ML/MIN/1.73SQ M
GFR SERPL CREATININE-BSD FRML MDRD: 28 ML/MIN/1.73SQ M
GLUCOSE P FAST SERPL-MCNC: 170 MG/DL (ref 65–99)
GLUCOSE P FAST SERPL-MCNC: 170 MG/DL (ref 65–99)
GLUCOSE SERPL-MCNC: 103 MG/DL (ref 65–140)
GLUCOSE SERPL-MCNC: 103 MG/DL (ref 65–140)
GLUCOSE SERPL-MCNC: 146 MG/DL (ref 65–140)
GLUCOSE SERPL-MCNC: 146 MG/DL (ref 65–140)
GLUCOSE SERPL-MCNC: 148 MG/DL (ref 65–140)
GLUCOSE SERPL-MCNC: 148 MG/DL (ref 65–140)
GLUCOSE SERPL-MCNC: 158 MG/DL (ref 65–140)
GLUCOSE SERPL-MCNC: 158 MG/DL (ref 65–140)
GLUCOSE SERPL-MCNC: 170 MG/DL (ref 65–140)
GLUCOSE SERPL-MCNC: 170 MG/DL (ref 65–140)
GLUCOSE SERPL-MCNC: 77 MG/DL (ref 65–140)
GLUCOSE SERPL-MCNC: 77 MG/DL (ref 65–140)
GLUCOSE SERPL-MCNC: 84 MG/DL (ref 65–140)
GLUCOSE SERPL-MCNC: 84 MG/DL (ref 65–140)
HCT VFR BLD AUTO: 27.4 % (ref 36.5–49.3)
HCT VFR BLD AUTO: 27.4 % (ref 36.5–49.3)
HGB BLD-MCNC: 8.2 G/DL (ref 12–17)
HGB BLD-MCNC: 8.2 G/DL (ref 12–17)
MAGNESIUM SERPL-MCNC: 1.6 MG/DL (ref 1.6–2.6)
MAGNESIUM SERPL-MCNC: 1.6 MG/DL (ref 1.6–2.6)
MCH RBC QN AUTO: 23.8 PG (ref 26.8–34.3)
MCH RBC QN AUTO: 23.8 PG (ref 26.8–34.3)
MCHC RBC AUTO-ENTMCNC: 29.9 G/DL (ref 31.4–37.4)
MCHC RBC AUTO-ENTMCNC: 29.9 G/DL (ref 31.4–37.4)
MCV RBC AUTO: 80 FL (ref 82–98)
MCV RBC AUTO: 80 FL (ref 82–98)
P AXIS: -7 DEGREES
P AXIS: -7 DEGREES
P AXIS: -9 DEGREES
P AXIS: -9 DEGREES
PLATELET # BLD AUTO: 142 THOUSANDS/UL (ref 149–390)
PLATELET # BLD AUTO: 142 THOUSANDS/UL (ref 149–390)
PMV BLD AUTO: 12 FL (ref 8.9–12.7)
PMV BLD AUTO: 12 FL (ref 8.9–12.7)
POTASSIUM SERPL-SCNC: 4.6 MMOL/L (ref 3.5–5.3)
PR INTERVAL: 146 MS
PROT SERPL-MCNC: 7.2 G/DL (ref 6.4–8.4)
PROT SERPL-MCNC: 7.2 G/DL (ref 6.4–8.4)
QRS AXIS: -12 DEGREES
QRS AXIS: -12 DEGREES
QRS AXIS: -13 DEGREES
QRS AXIS: -13 DEGREES
QRSD INTERVAL: 70 MS
QRSD INTERVAL: 70 MS
QRSD INTERVAL: 74 MS
QRSD INTERVAL: 74 MS
QT INTERVAL: 364 MS
QT INTERVAL: 364 MS
QT INTERVAL: 372 MS
QT INTERVAL: 372 MS
QTC INTERVAL: 425 MS
QTC INTERVAL: 425 MS
QTC INTERVAL: 426 MS
QTC INTERVAL: 426 MS
RBC # BLD AUTO: 3.44 MILLION/UL (ref 3.88–5.62)
RBC # BLD AUTO: 3.44 MILLION/UL (ref 3.88–5.62)
SODIUM SERPL-SCNC: 136 MMOL/L (ref 135–147)
SODIUM SERPL-SCNC: 136 MMOL/L (ref 135–147)
SODIUM SERPL-SCNC: 137 MMOL/L (ref 135–147)
SODIUM SERPL-SCNC: 137 MMOL/L (ref 135–147)
T WAVE AXIS: 14 DEGREES
T WAVE AXIS: 14 DEGREES
T WAVE AXIS: 17 DEGREES
T WAVE AXIS: 17 DEGREES
VENTRICULAR RATE: 79 BPM
VENTRICULAR RATE: 79 BPM
VENTRICULAR RATE: 82 BPM
VENTRICULAR RATE: 82 BPM
WBC # BLD AUTO: 5.63 THOUSAND/UL (ref 4.31–10.16)
WBC # BLD AUTO: 5.63 THOUSAND/UL (ref 4.31–10.16)

## 2022-09-21 PROCEDURE — 82948 REAGENT STRIP/BLOOD GLUCOSE: CPT

## 2022-09-21 PROCEDURE — 97116 GAIT TRAINING THERAPY: CPT

## 2022-09-21 PROCEDURE — 99232 SBSQ HOSP IP/OBS MODERATE 35: CPT | Performed by: INTERNAL MEDICINE

## 2022-09-21 PROCEDURE — 85027 COMPLETE CBC AUTOMATED: CPT | Performed by: INTERNAL MEDICINE

## 2022-09-21 PROCEDURE — 73630 X-RAY EXAM OF FOOT: CPT

## 2022-09-21 PROCEDURE — 97530 THERAPEUTIC ACTIVITIES: CPT

## 2022-09-21 PROCEDURE — 97167 OT EVAL HIGH COMPLEX 60 MIN: CPT

## 2022-09-21 PROCEDURE — 97163 PT EVAL HIGH COMPLEX 45 MIN: CPT

## 2022-09-21 PROCEDURE — 80053 COMPREHEN METABOLIC PANEL: CPT | Performed by: INTERNAL MEDICINE

## 2022-09-21 PROCEDURE — 99231 SBSQ HOSP IP/OBS SF/LOW 25: CPT | Performed by: EMERGENCY MEDICINE

## 2022-09-21 PROCEDURE — 93010 ELECTROCARDIOGRAM REPORT: CPT | Performed by: INTERNAL MEDICINE

## 2022-09-21 RX ORDER — OXYCODONE HYDROCHLORIDE 5 MG/1
5 TABLET ORAL EVERY 4 HOURS PRN
Status: DISCONTINUED | OUTPATIENT
Start: 2022-09-21 | End: 2022-09-22 | Stop reason: HOSPADM

## 2022-09-21 RX ORDER — LIDOCAINE 50 MG/G
1 PATCH TOPICAL DAILY
Status: DISCONTINUED | OUTPATIENT
Start: 2022-09-21 | End: 2022-09-22 | Stop reason: HOSPADM

## 2022-09-21 RX ORDER — ACETAMINOPHEN 325 MG/1
975 TABLET ORAL EVERY 8 HOURS SCHEDULED
Status: DISCONTINUED | OUTPATIENT
Start: 2022-09-21 | End: 2022-09-22 | Stop reason: HOSPADM

## 2022-09-21 RX ADMIN — ACETAMINOPHEN 975 MG: 325 TABLET, FILM COATED ORAL at 13:21

## 2022-09-21 RX ADMIN — TACROLIMUS 1 MG: 1 CAPSULE ORAL at 08:34

## 2022-09-21 RX ADMIN — OXYCODONE HYDROCHLORIDE 5 MG: 5 TABLET ORAL at 10:09

## 2022-09-21 RX ADMIN — SODIUM CHLORIDE, SODIUM GLUCONATE, SODIUM ACETATE, POTASSIUM CHLORIDE, MAGNESIUM CHLORIDE, SODIUM PHOSPHATE, DIBASIC, AND POTASSIUM PHOSPHATE 100 ML/HR: .53; .5; .37; .037; .03; .012; .00082 INJECTION, SOLUTION INTRAVENOUS at 22:09

## 2022-09-21 RX ADMIN — HEPARIN SODIUM 5000 UNITS: 5000 INJECTION INTRAVENOUS; SUBCUTANEOUS at 13:21

## 2022-09-21 RX ADMIN — TACROLIMUS 1 MG: 1 CAPSULE ORAL at 22:07

## 2022-09-21 RX ADMIN — ACETAMINOPHEN 975 MG: 325 TABLET, FILM COATED ORAL at 22:07

## 2022-09-21 RX ADMIN — INSULIN DETEMIR 15 UNITS: 100 INJECTION, SOLUTION SUBCUTANEOUS at 22:07

## 2022-09-21 RX ADMIN — HEPARIN SODIUM 5000 UNITS: 5000 INJECTION INTRAVENOUS; SUBCUTANEOUS at 22:07

## 2022-09-21 RX ADMIN — INSULIN LISPRO 1 UNITS: 100 INJECTION, SOLUTION INTRAVENOUS; SUBCUTANEOUS at 22:08

## 2022-09-21 RX ADMIN — HEPARIN SODIUM 5000 UNITS: 5000 INJECTION INTRAVENOUS; SUBCUTANEOUS at 04:52

## 2022-09-21 RX ADMIN — LIDOCAINE 5% 1 PATCH: 700 PATCH TOPICAL at 10:09

## 2022-09-21 RX ADMIN — PHENAZOPYRIDINE 100 MG: 100 TABLET ORAL at 22:34

## 2022-09-21 RX ADMIN — ACETAMINOPHEN 650 MG: 325 TABLET ORAL at 04:53

## 2022-09-21 RX ADMIN — CLOPIDOGREL BISULFATE 75 MG: 75 TABLET ORAL at 08:34

## 2022-09-21 RX ADMIN — PREGABALIN 50 MG: 50 CAPSULE ORAL at 08:34

## 2022-09-21 RX ADMIN — PREGABALIN 50 MG: 50 CAPSULE ORAL at 16:00

## 2022-09-21 RX ADMIN — TAMSULOSIN HYDROCHLORIDE 0.4 MG: 0.4 CAPSULE ORAL at 16:00

## 2022-09-21 RX ADMIN — PREGABALIN 50 MG: 50 CAPSULE ORAL at 22:07

## 2022-09-21 RX ADMIN — OXYCODONE HYDROCHLORIDE 2.5 MG: 5 TABLET ORAL at 04:52

## 2022-09-21 NOTE — PROGRESS NOTES
[address] [facility]  Progress Note - Oneal Simpler 1950, 74 y o  male MRN: [id number]  Unit/Bed#: PPHP 903-01 Encounter: [id number]  Primary Care Provider: Akanksha Rolle,    Date and time admitted to hospital: 9/20/2022 10:46 AM    * Multiple falls  Assessment & Plan  - trauma workup negative for injuries  - rest of treatment per medical team        TERTIARY TRAUMA SURVEY NOTE    Prophylaxis: Heparin    Disposition: Admitted to medicine service    Code status:  Level 3 - DNAR and DNI    Consultants: N/A      SUBJECTIVE:     Mechanism of Injury:Fall    Chief Complaint: Right ankle pain    HPI/Last 24 hour events: Patient admitted to medicine service yesterday after multiple recent falls  No traumatic injuries noted, care per medical team  No major overnight events, patient still complaining of right ankle pain that is worse with ambulation, otherwise no complaints this morning       Active medications:           Current Facility-Administered Medications:     acetaminophen (TYLENOL) tablet 650 mg, 650 mg, Oral, Q6H PRN, 650 mg at 09/21/22 0453    clopidogrel (PLAVIX) tablet 75 mg, 75 mg, Oral, Daily, 75 mg at 09/20/22 1716    heparin (porcine) subcutaneous injection 5,000 Units, 5,000 Units, Subcutaneous, Q8H [facility], 5,000 Units at 09/21/22 0452 **AND** [CANCELED] Platelet count, , , Once    insulin detemir (LEVEMIR) subcutaneous injection 15 Units, 15 Units, Subcutaneous, HS, 15 Units at 09/20/22 2156    insulin lispro (HumaLOG) 100 units/mL subcutaneous injection 1-5 Units, 1-5 Units, Subcutaneous, TID AC **AND** Fingerstick Glucose (POCT), , , TID AC    insulin lispro (HumaLOG) 100 units/mL subcutaneous injection 1-5 Units, 1-5 Units, Subcutaneous, HS, 1 Units at 09/20/22 2245    multi-electrolyte (PLASMALYTE-A/ISOLYTE-S PH 7 4) IV solution, 100 mL/hr, Intravenous, Continuous, 100 mL/hr at 09/20/22 1701    ondansetron (ZOFRAN) injection 4 mg, 4 mg, Intravenous, Q6H PRN   oxyCODONE (ROXICODONE) IR tablet 2 5 mg, 2 5 mg, Oral, Q4H PRN, 2 5 mg at 09/21/22 0452    phenazopyridine (PYRIDIUM) tablet 100 mg, 100 mg, Oral, Once per day on Mon Wed Fri    pregabalin (LYRICA) capsule 50 mg, 50 mg, Oral, TID, 50 mg at 09/20/22 2149    senna (SENOKOT) tablet 8 6 mg, 1 tablet, Oral, HS PRN    tacrolimus (PROGRAF) capsule 1 mg, 1 mg, Oral, Q12H CROW, 1 mg at 09/20/22 2150    tamsulosin (FLOMAX) capsule 0 4 mg, 0 4 mg, Oral, Daily With Dinner, 0 4 mg at 09/20/22 1656      OBJECTIVE:     Vitals:   Vitals:    09/21/22 0239   BP: 114/59   Pulse: 79   Resp: 18   Temp: 98 4 °F (36 9 °C)   SpO2: 99%       Physical Exam:   General: No acute distress  Neuro: Alert and awake; Following commands; Moving all extremities  HEENT: Moist mucus membranes  CV: Regular rate  Pulm: No respiratory distress  MSK: ROM intact; right ankle mildly swollen, no obvious deformities  Skin: Warm and dry      I/O:   I/O       09/19 0701 09/20 0700 09/20 0701 09/21 0700 09/21 0701 09/22 0700    Urine (mL/kg/hr)  200     Total Output  200     Net  -200                  Invasive Devices: Invasive Devices  Report    Central Venous Catheter Line  Duration           Port A Cath Subclavian -- days                  Imaging:   TRAUMA - CT head wo contrast    Result Date: 9/20/2022  Impression: No acute intracranial abnormality  Chronic microangiopathic changes  I personally discussed this study with Aguilar Reddy on 9/20/2022 at 11:28 AM  Workstation performed: [id number]     TRAUMA - CT spine cervical wo contrast    Result Date: 9/20/2022  Impression: No cervical spine fracture or traumatic malalignment  I personally discussed this study with Aguilar Reddy on 9/20/2022 at 11:28 AM   Workstation performed: [id number]     XR trauma multiple    Result Date: 9/20/2022  Impression: No acute cardiopulmonary disease within limitations of supine imaging  No acute osseous abnormality of the ankle, tibia, or fibula   Workstation performed: [id number]       Labs:   CBC:   Lab Results   Component Value Date    WBC 5 63 09/21/2022    HGB 8 2 (L) 09/21/2022    HCT 27 4 (L) 09/21/2022    MCV 80 (L) 09/21/2022     (L) 09/21/2022    MCH 23 8 (L) 09/21/2022    MCHC 29 9 (L) 09/21/2022    RDW 15 9 (H) 09/21/2022    MPV 12 0 09/21/2022    NRBC 0 09/20/2022     CMP:   Lab Results   Component Value Date     09/21/2022    CO2 26 09/21/2022    BUN 30 (H) 09/21/2022    CREATININE 2 20 (H) 09/21/2022    CALCIUM 8 3 09/21/2022    AST 18 09/21/2022    ALT 18 09/21/2022    ALKPHOS 71 09/21/2022    EGFR 28 09/21/2022

## 2022-09-21 NOTE — ASSESSMENT & PLAN NOTE
F/u xrays done on admission  · Schedule tylenol  · PRN oxy as per geriatric pain management set  · Lido patch   · PT/OT recommending rehab

## 2022-09-21 NOTE — NURSING NOTE
Patient resting comfortable, no c/o chest pain or SOB  's   3  Consecutive PVCs noted on monitor  EKG revealed normal sinus rhythm  Linsey Holts made aware, New order received "cheek BMP and MAG"

## 2022-09-21 NOTE — ASSESSMENT & PLAN NOTE
Presented with 3 falls in 1 night  Denies LOC  · Evaluated by trauma team in ER  · CT scan of the head, CT C spine neg for acute injuries  · CXR, R tib/fib and R ankle xray pending  · Falls occurred when getting OOB and according to the patient's daughter, patient has not been adequately drinking fluids  · Patient also on Flomax at bedtime, as well as temazepam and Ambien--likely 2/2 orthostatic hypotension  · Orthostatic BPs + this AM with PT--continue IVF  · Add compression stockings  · Hold Ambien and temazepam  · Telemetry without events, discontinued   Troponins negative, EKG NSR  · PT/OT recommending rehab

## 2022-09-21 NOTE — ASSESSMENT & PLAN NOTE
No results found for: HGBA1C    Recent Labs     09/20/22  1710 09/20/22 2059 09/21/22  0745   POCGLU 103 171* 84       Blood Sugar Average: Last 72 hrs:  (P) 846 [id number]  · Continue patient's Levemir at 15 units at bedtime    · SSI/accuchecks

## 2022-09-21 NOTE — PLAN OF CARE
Problem: OCCUPATIONAL THERAPY ADULT  Goal: Performs self-care activities at highest level of function for planned discharge setting  See evaluation for individualized goals  Description: Treatment Interventions: ADL retraining, Functional transfer training, UE strengthening/ROM, Endurance training, Patient/family training, Equipment evaluation/education, Compensatory technique education, Continued evaluation, Energy conservation, Activityengagement          See flowsheet documentation for full assessment, interventions and recommendations  Note: Limitation: Decreased ADL status, Decreased endurance, Decreased self-care trans, Decreased high-level ADLs  Prognosis: Good  Assessment: Pt is a 74 y o  male admitted to [facility] on [facility]/20/2022 w/ Multiple falls  Imaging negative for acute abnormalities  has a past medical history of Anemia, Bladder cancer, Hydronephrosis of right kidney, Liver cirrhosis, Liver transplant status, Renal disorder, Stroke, and Thrombocytopenia  Pt with active OT orders and up with assistance  orders  Pt seen as a co-evaluation with PT due to the patient's co-morbidities, clinically unstable presentation/clinical complexity, and present impairments  As per pt report, pta, resides with his wife and dtr in a 2STH, 4STE  Pt was I w/  ADLS and IADLS, (+) drove  Upon evaluation, pt currently requires MIN A sup to sit, MOD A x 2 STS  Pt unable to progress further at this time 2* pain and dizziness  BP dropped 24 points systolic and 29 points diastolic from sitting to standing  Pt currently requires I eating, I grooming, S UB ADLs, MOD A LB ADLs, and MIN A toileting  Pt is limited at this time 2*: pain, endurance, activity tolerance, functional mobility, balance, functional standing tolerance, decreased I w/ ADLS/IADLS and strength  The following Occupational Performance Areas to address include: grooming, bathing/shower, toilet hygiene, dressing, health maintenance, functional mobility, community mobility and clothing management  Pt to benefit from immediate acute skilled OT to address above deficits, improve overall functional independence, maximize fnxl mobility and reduce caregiver burden  From OT standpoint, recommendation at time of d/c would be STR - pending progress  Pt was left after session with all current needs met  The patient's raw score on the AM-PAC Daily Activity inpatient short form is 18, standardized score is 38 66, less than 39 4  Patients at this level are likely to benefit from discharge to post-acute rehabilitation services  Please refer to the recommendation of the Occupational Therapist for safe discharge planning       OT Discharge Recommendation: Post acute rehabilitation services (pending progress)

## 2022-09-21 NOTE — CASE MANAGEMENT
Case Management Assessment & Discharge Planning Note    Patient name Shahram Richey  [facility] [id number]  : 1950 Date 2022       Current Admission Date: 2022  Current Admission Diagnosis:Multiple falls   Patient Active Problem List    Diagnosis Date Noted    Right ankle pain 2022    Orthostatic hypotension 2022    Hydronephrosis 2022    Multiple falls 2022    Stage 4 chronic kidney disease ([facility] ) 2022    Anemia due to chronic kidney disease 2022    Thrombocytopenia ([place] [address] ) 2022    History of bladder cancer 2022    History of liver transplant ([place] [address] ) 2022    Controlled type 2 diabetes mellitus with diabetic neuropathy, with long-term current use of insulin ([place] [address] ) 2022    History of CVA (cerebrovascular accident) 2022    History of COVID-19 2022      LOS (days): 0  Geometric Mean LOS (GMLOS) (days):   Days to GMLOS:     OBJECTIVE:              Current admission status: Inpatient       Preferred Pharmacy: No Pharmacies Listed  Primary Care Provider: Shaun Morales DO    Primary Insurance: Tessa Jalloh [facility]  Secondary Insurance:     ASSESSMENT:  Jhon [address], [address] [facility] - Significant Other   Primary Phone: [phone] (Mobile)               Advance Directives  Does patient have a [address]?: No  Does patient have Advance Directives?: No    Patient Information  Admitted from[de-identified] Home  Mental Status: Alert  During Assessment patient was accompanied by: Not accompanied during assessment  Assessment information provided by[de-identified] Patient  Primary Caregiver: Self  Support Systems: Spouse/significant other, Family members  [place] of Residence: [address] do you live in?: [facility] entry access options   Select all that apply : Stairs  Number of steps to enter home : 5  Do the steps have railings?: Yes  Type of Current Residence: 2 story home  Upon entering residence, is there a bedroom on the main floor (no further steps)?: Yes  Upon entering residence, is there a bathroom on the main floor (no further steps)?: Yes  In the last 12 months, was there a time when you were not able to pay the mortgage or rent on time?: No  In the last 12 months, how many places have you lived?: 1  In the last 12 months, was there a time when you did not have a steady place to sleep or slept in a shelter (including now)?: No  Homeless/housing insecurity resource given?: N/A  Living Arrangements: Lives w/ Spouse/significant other, Lives w/ Daughter  Is patient a ?: No    Activities of Daily Living Prior to Admission  Functional Status: Independent  Completes ADLs independently?: Yes  Ambulates independently?: Yes  Does patient use assisted devices?: No  Does patient currently own DME?: Yes  What DME does the patient currently own?: [employer]  Does patient have a history of Outpatient Therapy (PT/OT)?: No  Does the patient have a history of Short-Term Rehab?: No  Does patient have a history of HHC?: Yes (patient believes was [facility])  Does patient currently have [facility]?: No         Patient Information Continued  Income Source: Pension/residential  Does patient have prescription coverage?: Yes  Within the past 12 months, you worried that your food would run out before you got the money to buy more : Never true  Within the past 12 months, the food you bought just didn't last and you didn't have money to get more : Never true  Food insecurity resource given?: N/A  Does patient receive dialysis treatments?: No  Does patient have a history of substance abuse?: Yes  Historical substance use preference: Alcohol/ETOH  Is patient currently in treatment for substance abuse?: N/A - sober  Does patient have a history of Mental Health Diagnosis?: No         Means of Transportation  Means of Transport to Appts[de-identified] Drives Self  In the past 12 months, has lack of transportation kept you from medical appointments or from getting medications?: No  In the past 12 months, has lack of transportation kept you from meetings, work, or from getting things needed for daily living?: No  Was application for public transport provided?: N/A        DISCHARGE DETAILS:    Discharge planning discussed with[de-identified] Patient  Freedom of Choice: Yes  Comments - Freedom of Choice: Discussed FOC  CM contacted family/caregiver?: Yes  Were Treatment Team discharge recommendations reviewed with patient/caregiver?: Yes  Did patient/caregiver verbalize understanding of patient care needs?: Yes  Were patient/caregiver advised of the risks associated with not following Treatment Team discharge recommendations?: Yes    Other Referral/Resources/Interventions Provided:  Interventions: [facility]  Referral Comments: This CM spoke with patient and wife, via phone, therapy rec STR post DC  Patient refusing, aware of risks of not following therapy recommendations  Agreeable to [facility], would like this CM to start with [facility], if they are unable to accept, patient and wife are agreeable to blanket referral to agencies servicing his zip code to see who can accept  Referral to [facility] entered in Mounika Robles     CM reviewed d/c planning process including the following: identifying help at home, patient preference for d/c planning needs, Discharge Lounge, [facility] Meds to Bed program, availability of treatment team to discuss questions or concerns patient and/or family may have regarding understanding medications and recognizing signs and symptoms once discharged  CM also encouraged patient to follow up with all recommended appointments after discharge  Patient advised of importance for patient and family to participate in managing patients medical well being  Information obtained from patient at bedside  Reports IADLS, drives self  Vaccinated for covid with 1 booster noted    Lives home with wife and daughter in 2 story home, [address], has first floor set up Only DME at home is cane, which reports he does not use

## 2022-09-21 NOTE — ASSESSMENT & PLAN NOTE
Follows with oncologist and palliative care specialist   · Had chemotherapy and radiation treatment  · History of right hydroureteronephrosis, with right nephrostomy tube

## 2022-09-21 NOTE — OCCUPATIONAL THERAPY NOTE
Occupational Therapy Evaluation     Patient Name: Keegan Neely  Today's Date: 9/21/2022  Problem List  Principal Problem:    Multiple falls  Active Problems:    Stage 4 chronic kidney disease ([facility] )    Anemia due to chronic kidney disease    Thrombocytopenia (HCC)    History of bladder cancer    History of liver transplant ([facility] )    Controlled type 2 diabetes mellitus with diabetic neuropathy, with long-term current use of insulin (HCC)    History of CVA (cerebrovascular accident)    History of COVID-19    Right ankle pain    Orthostatic hypotension    Hydronephrosis    Past Medical History  Past Medical History:   Diagnosis Date    Anemia     Bladder cancer ([facility] )     Hydronephrosis of right kidney     Liver cirrhosis ([facility] )     L[facility] status ([facility] )     Renal disorder     Stroke ([facility] )     Thrombocytopenia ([facility] )      Past Surgical History  Past Surgical History:   Procedure Laterality Date    HEPATITIS B SURFACE AB QN,LIVER TRANSPLANT (HISTORICAL)             09/21/22 0907   OT Last Visit   OT Visit Date 09/21/22   Note Type   Note type Evaluation   Restrictions/Precautions   Other Precautions Chair Alarm; Bed Alarm;Multiple lines; Fall Risk;Pain   Pain Assessment   Pain Assessment Tool 0-10   Pain Score 8   Pain Location/Orientation Orientation: Right  (ankle - imaging (-) for acute abnormality)   Hospital Pain Intervention(s) Repositioned; Ambulation/increased activity   Home Living   Type of [address] [place] [address] Two level; Able to live on main level with bedroom/bathroom  (4STE)   Bathroom Shower/Tub Tub/shower unit   Bathroom Toilet Raised   [address] [facility]  (did not use PTA)   Prior Function   Level of [place] Independent with ADLs and functional mobility   Lives With Spouse;Daughter   Receives Help From Family   ADL Assistance Independent   IADLs Independent   Falls in the last 6 months 1 to 4  (4 per pt report - 3 leading to current admission)   Vocational Retired   Lifestyle   Autonomy PTA, pt reports being I with ADLs, IADLs, fnxl mobility, (+)    Reciprocal Relationships Wife, dtr - pt reports they can assist as needed   Service to Others Retired   Intrinsic Gratification Being outside   Psychosocial   Psychosocial (WDL) WDL   ADL   Where Assessed Edge of bed   Eating Assistance 7  Independent   Grooming Assistance 7  [address] 5  Supervision/Setup   LB [address] 3  Moderate Assistance   UB Dressing Assistance 5  Supervision/Setup    [facility] 3  Moderate [address]  4  Minimal Assistance   Bed Mobility   Supine to Sit 4  Minimal assistance   Additional items Assist x 1;HOB elevated; Bedrails; Increased time required;LE management   Sit to Supine 4  Minimal assistance   Additional items Assist x 1;HOB elevated; Bedrails; Increased time required;LE management   Transfers   Sit to Stand 3  Moderate assistance   Additional items Assist x 2; Increased time required   Stand to Sit 3  Moderate assistance   Additional items Assist x 2; Increased time required;Verbal cues   Additional Comments (S)  maintained NWB RLE 2* ankle pain  Orthos as follows: lying - 120/67, sitting EOB - 104/63, and standing - 80/34   Functional Mobility   Additional Comments Unable to assess at this time   Balance   Static Sitting Fair +   Dynamic Sitting Fair   Static Standing Poor +   Dynamic Standing Poor   Ambulatory Zero   Activity Tolerance   Activity Tolerance Patient limited by fatigue;Patient limited by pain   Medical Staff Made Aware PT Claudell Loyal   Nurse Made Aware SHANTHI Combs Assessment   RUE Assessment WFL   LUE Assessment   LUE Assessment WFL   Vision - Complex Assessment   Ocular Range of Motion WFL   Head Position WDL   Tracking Able to track stimulus in all quads without difficulty   Cognition   Overall Cognitive Status [facility]   Arousal/Participation Alert; Responsive; Cooperative   Attention Attends with cues to redirect   Orientation Level Oriented to person;Oriented to place;Oriented to situation  (oriented to year)   Memory Within functional limits   Following Commands Follows all commands and directions without difficulty   Comments Pt very pleasant and cooperative t/o session   Assessment   Limitation Decreased ADL status; Decreased endurance;Decreased self-care trans;Decreased high-level ADLs   Prognosis Good   Assessment Pt is a 74 y o  male admitted to [facility] on 9/20/2022 w/ Multiple falls  Imaging negative for acute abnormalities  has a past medical history of Anemia, Bladder cancer, Hydronephrosis of right kidney, Liver cirrhosis, Liver transplant status, Renal disorder, Stroke, and Thrombocytopenia  Pt with active OT orders and up with assistance  orders  Pt seen as a co-evaluation with PT due to the patient's co-morbidities, clinically unstable presentation/clinical complexity, and present impairments  As per pt report, pta, resides with his wife and dtr in a 2STH, 4STE  Pt was I w/  ADLS and IADLS, (+) drove  Upon evaluation, pt currently requires MIN A sup to sit, MOD A x 2 STS  Pt unable to progress further at this time 2* pain and dizziness  BP dropped 24 points systolic and 29 points diastolic from sitting to standing  Pt currently requires I eating, I grooming, S UB ADLs, MOD A LB ADLs, and MIN A toileting  Pt is limited at this time 2*: pain, endurance, activity tolerance, functional mobility, balance, functional standing tolerance, decreased I w/ ADLS/IADLS and strength  The following Occupational Performance Areas to address include: grooming, bathing/shower, toilet hygiene, dressing, health maintenance, functional mobility, community mobility and clothing management  Pt to benefit from immediate acute skilled OT to address above deficits, improve overall functional independence, maximize fnxl mobility and reduce caregiver burden  From OT standpoint, recommendation at time of d/c would be STR - pending progress    Pt was left after session with all current needs met  The patient's raw score on the AM-PAC Daily Activity inpatient short form is 18, standardized score is 38 66, less than 39 4  Patients at this level are likely to benefit from discharge to post-acute rehabilitation services  Please refer to the recommendation of the Occupational Therapist for safe discharge planning  Goals   Patient Goals to decrease his pain   LTG Time Frame 10-14   Plan   Treatment Interventions ADL retraining;Functional transfer training;UE strengthening/ROM; Endurance training;Patient/family training;Equipment evaluation/education; Compensatory technique education;Continued evaluation; Energy conservation; Activityengagement   Goal Expiration Date 10/05/22   OT Frequency 3-5x/wk   Recommendation   OT Discharge Recommendation Post acute rehabilitation services  (pending progress)   AM-PAC Daily Activity Inpatient   Lower Body Dressing 2   Bathing 2   Toileting 3   Upper Body Dressing 3   Grooming 4   Eating 4   Daily Activity Raw Score 18   Daily Activity Standardized Score (Calc for Raw Score >=11) 38 66   [facility] Applied Cognition Inpatient   Following a Speech/Presentation 4   Understanding Ordinary Conversation 4   Taking Medications 4   Remembering Where Things Are Placed or Put Away 4   Remembering List of 4-5 Errands 4   Taking Care of Complicated Tasks 3   Applied Cognition Raw Score 23   Applied Cognition Standardized Score 53 08       GOALS    - Pt will complete UB dressing/self care/bathing w/ mod I using adaptive device and DME as needed    - Pt will complete LB dressing/self care/bathing w/ mod I using adaptive device and DME as needed    - Pt will complete toileting w/ mod I w/ G hygiene/thoroughness using DME as needed    - Pt will improve functional transfers to Mod I on/off all surfaces using DME as needed w/ G balance/safety     - Pt will improve functional mobility during ADL/IADL/leisure tasks to Mod I using DME as needed w/ G balance/safety     - Pt will demonstrate G carryover of pt/caregiver education and training as appropriate      - Pt will demonstrate 100% carryover of energy conservation techniques t/o functional I/ADL/leisure tasks w/o cues s/p skilled education    - Pt will engage in ongoing cognitive assessment w/ G participation to assist w/ safe d/c planning/recommendations    - Pt will increase static and dynamic standing/sitting balance to F+  using AD/DME as needed to increase safety and I during fnxl transfers and ADLs    - Pt will maintain upright sitting position for at least 20 min during dynamic fnxl activity with F+  balance and endurance to improve ADL performance and independence, as well as reduce risk of falls     - Pt will participate in simulated IADL management task with DME as needed to increase independence to  w/ G safety and endurance    Dara Teran MS, OTR/L

## 2022-09-21 NOTE — PLAN OF CARE
Problem: PHYSICAL THERAPY ADULT  Goal: Performs mobility at highest level of function for planned discharge setting  See evaluation for individualized goals  Description: Treatment/Interventions: Functional transfer training, Endurance training, Patient/family training, Equipment eval/education, Bed mobility, Gait training, Spoke to nursing  Equipment Recommended: Steph Underwood       See flowsheet documentation for full assessment, interventions and recommendations  Note: Prognosis: Fair  Problem List: Decreased strength, Decreased range of motion, Decreased endurance, Impaired balance, Decreased mobility, Decreased coordination, Decreased skin integrity, Pain  Assessment: Pt is a 75 y/o male admitted to [facility] 2* s/p multiple falls at home, PTA, when trying to get OOB  Pt lives with wife in 3 SH, (+) [place]IC[place] and reports no use of personal DME for mobility PTA  Pt reports multiple falls while trying to get OOB and pt reports (+)head strike  Pt currently is not at functional mobility baseline, needs significant A to get OOB 2* inc pain RLE and ataxic/unsteady movements and gait pattern, multiple lines, masimo monitoring  Pt demonstrates moderate deficits during functional mobility and gait including dec endurance, dec balance, dec BLE strength (RLE>LLE),inc pain medial R ankle area during WB/at rest and tender to touch, and needs S for BM and mod AX1 for transfers and GT with use of RW  Pt able to hop on LLE during gait movement 2* inability to WB RLE 2* inc pain  Nursing made aware  Pt would cont to benefit from skilled inpt PT services to maximize functional independence and to dec caregiver burden  Barriers to Discharge: Inaccessible home environment     PT Discharge Recommendation: Post acute rehabilitation services    See flowsheet documentation for full assessment

## 2022-09-21 NOTE — ASSESSMENT & PLAN NOTE
Lab Results   Component Value Date    EGFR 28 09/21/2022    EGFR 27 09/20/2022    EGFR 27 09/20/2022    CREATININE 2 20 (H) 09/21/2022    CREATININE 2 24 (H) 09/20/2022    CREATININE 2 28 (H) 09/20/2022     · Likely baseline creatinine is around 2 4 to 2 7 from chart review   · Continue IVF  · Monitor BMP

## 2022-09-21 NOTE — PROGRESS NOTES
[address]  Progress Note - Nicklas Pages 1950, 74 y o  male MRN: [id number]  Unit/Bed#: [facility] 903-01 Encounter: [id number]  Primary Care Provider: Zhen Cuellar DO   Date and time admitted to hospital: 9/20/2022 10:46 AM    I updated wife via phone  * Multiple falls  Assessment & Plan  Presented with 3 falls in 1 night  Denies LOC  · Evaluated by trauma team in ER  · CT scan of the head, CT C spine neg for acute injuries  · CXR, R tib/fib and R ankle xray pending  · Falls occurred when getting OOB and according to the patient's daughter, patient has not been adequately drinking fluids  · Patient also on Flomax at bedtime, as well as temazepam and Ambien--likely 2/2 orthostatic hypotension  · Orthostatic BPs + this AM with PT--continue IVF  · Add compression stockings  · Hold Ambien and temazepam  · Telemetry without events, discontinued  Troponins negative, EKG NSR  · PT/OT recommending rehab    Hydronephrosis  Assessment & Plan  Hx of Right-sided hydroureteronephrosis, status post right-sided nephrostomy tube in May 2022  · S/p present CT a/p no r hydronephrosis      Orthostatic hypotension  Assessment & Plan  Likely in setting of poor PO intake last few days (recent COVID infection)  · Orthostatics + with PT on 9/21 AM  · IVF, compression stockings  · Ambien/restorial held  · Continue flomax but could be contributing    Right ankle pain  Assessment & Plan  F/u xrays done on admission  · Schedule tylenol  · PRN oxy as per geriatric pain management set  · Lido patch   · PT/OT recommending rehab    History of COVID-19  Assessment & Plan  Diagnosed, 9/12  · Presently asymptomatic and never required oxygen  · Did get Bebtelovimab infusion outpatient   · No need for further treatment/isolation     History of CVA (cerebrovascular accident)  Assessment & Plan  Continue Plavix  · Not on atorvastatin due to allergy        Controlled type 2 diabetes mellitus with diabetic neuropathy, with long-term current use of insulin [facility])  Assessment & Plan  No results found for: HGBA1C    Recent Labs     09/20/22  1710 09/20/22 2059 09/21/22  0745   POCGLU 103 171* 84       Blood Sugar Average: Last 72 hrs:  (P) 194 [id number]  · Continue patient's Levemir at 15 units at bedtime  · SSI/accuchecks    History of liver transplant [facility])  Assessment & Plan  Continue tacrolimus  · Monitor CMP    History of bladder cancer  Assessment & Plan  Follows with oncologist and palliative care specialist   · Had chemotherapy and radiation treatment  · History of right hydroureteronephrosis, with right nephrostomy tube  Thrombocytopenia (HCC)  Assessment & Plan  Mild, chronic and stable   · No bleeding     Anemia due to chronic kidney disease  Assessment & Plan  Per chart review chronic and stable  · Monitor  · No active bleeding  Stage 4 chronic kidney disease [facility])  Assessment & Plan  Lab Results   Component Value Date    EGFR 28 09/21/2022    EGFR 27 09/20/2022    EGFR 27 09/20/2022    CREATININE 2 20 (H) 09/21/2022    CREATININE 2 24 (H) 09/20/2022    CREATININE 2 28 (H) 09/20/2022     · Likely baseline creatinine is around 2 4 to 2 7 from chart review   · Continue IVF  · Monitor BMP      VTE Pharmacologic Prophylaxis: VTE Score: 3 Moderate Risk (Score 3-4) - Pharmacological DVT Prophylaxis Ordered: heparin  Patient Centered Rounds: I performed bedside rounds with nursing staff today  Discussions with Specialists or Other Care Team Provider: d/w CM    Education and Discussions with Family / Patient: Attempted to update  (wife) via phone  Unable to contact  Time Spent for Care: 30 minutes  More than 50% of total time spent on counseling and coordination of care as described above      Current Length of Stay: 0 day(s)  Current Patient Status: OBS  Certification Statement: The patient, admitted on an observation basis, will now require > 2 midnight hospital stay due to as above   Discharge Plan: Anticipate discharge in 48-72 hrs to discharge location to be determined pending rehab evaluations  Code Status: Level 3 - DNAR and DNI    Subjective:   Pt reports doing okay other than having a lot of pain in his R ankle  He apparently got dizzy with PT and orthostatics were +  Objective:     Vitals:   Temp (24hrs), Av 2 °F (36 8 °C), Min:97 9 °F (36 6 °C), Max:98 7 °F (37 1 °C)    Temp:  [97 9 °F (36 6 °C)-98 7 °F (37 1 °C)] 97 9 °F (36 6 °C)  HR:  [] 104  Resp:  [14-27] 18  BP: ()/(34-79) 80/34  SpO2:  [96 %-100 %] 97 %  There is no height or weight on file to calculate BMI  Input and Output Summary (last 24 hours): Intake/Output Summary (Last 24 hours) at 2022 0955  Last data filed at 2022 0847  Gross per 24 hour   Intake 260 ml   Output 200 ml   Net 60 ml       Physical Exam:   Physical Exam  Vitals and nursing note reviewed  Constitutional:       General: He is not in acute distress  Cardiovascular:      Rate and Rhythm: Normal rate and regular rhythm  Comments: R chest wall port noted   Pulmonary:      Effort: No respiratory distress  Abdominal:      General: There is no distension  Musculoskeletal:         General: Tenderness (R ankle ) present  Right lower leg: No edema  Left lower leg: No edema  Skin:     Findings: Bruising (dorsum R foot ) present  Neurological:      Mental Status: He is oriented to person, place, and time     Psychiatric:         Mood and Affect: Mood normal           Additional Data:     Labs:  Results from last 7 days   Lab Units 22  0521 22  1210   WBC Thousand/uL 5 63 5 93   HEMOGLOBIN g/dL 8 2* 8 6*   HEMATOCRIT % 27 4* 27 7*   PLATELETS Thousands/uL 142* 142*   NEUTROS PCT %  --  61   LYMPHS PCT %  --  18   MONOS PCT %  --  13*   EOS PCT %  --  7*     Results from last 7 days   Lab Units 22  0521   SODIUM mmol/L 136   POTASSIUM mmol/L 4 6   CHLORIDE mmol/L 105   CO2 mmol/L 26 BUN mg/dL 30*   CREATININE mg/dL 2 20*   ANION GAP mmol/L 5   CALCIUM mg/dL 8 3   ALBUMIN g/dL 2 6*   TOTAL BILIRUBIN mg/dL 0 30   ALK PHOS U/L 71   ALT U/L 18   AST U/L 18   GLUCOSE RANDOM mg/dL 103     Results from last 7 days   Lab Units 09/20/22  1210   INR  1 08     Results from last 7 days   Lab Units 09/21/22  0745 09/20/22  2059 09/20/22  1710   POC GLUCOSE mg/dl 84 171* 103               Lines/Drains:  Invasive Devices  Report    Central Venous Catheter Line  Duration           Port A Cath Subclavian -- days                Central Line:  Goal for removal: chronic port          Telemetry:  Telemetry Orders (From admission, onward)             24 Hour Telemetry Monitoring  Continuous x 24 Hours (Telem)        Expiring   References:    Telemetry Guidelines   Question:  Reason for 24 Hour Telemetry  Answer:  Syncope suspected to be cardiac in origin                 Telemetry Reviewed: Normal Sinus Rhythm  Indication for Continued Telemetry Use: No indication for continued use  Will discontinue                Imaging: Reviewed radiology reports from this admission including: CT head and CT C spine     Recent Cultures (last 7 days):         Last 24 Hours Medication List:   Current Facility-Administered Medications   Medication Dose Route Frequency Provider Last Rate    acetaminophen  975 mg Oral Q8H [facility] Lay Buckner PA-C      clopidogrel  75 mg Oral Daily Wallie Alpers Pintor, MD      heparin (porcine)  5,000 Units Subcutaneous Q8H Jersonmatincarlos Olson MD      insulin detemir  15 Units Subcutaneous HS Wallie Alpers Pintor, MD      insulin lispro  1-5 Units Subcutaneous TID AC Alpiniano Corinna Goodell, MD      insulin lispro  1-5 Units Subcutaneous HS Wallie Alpers Pintor, MD      lidocaine  1 patch Topical Daily Guero Longoria PA-C      multi-electrolyte  100 mL/hr Intravenous Continuous Oneil Olson  mL/hr (09/20/22 1701)    ondansetron  4 mg Intravenous Q6H PRN Oneil Adorno MD      oxyCODONE  2 5 mg Oral Q4H PRN Oneil Adorno MD      oxyCODONE  5 mg Oral Q4H PRN Len Shah PA-C      phenazopyridine  100 mg Oral Once per day on Mon Wed Fri [address] MD Wesley      pregabalin  50 mg Oral TID [address] MD Wesley      senna  1 tablet Oral HS PRN [address] MD Wesley      tacrolimus  1 mg Oral Q12H [address] MD Wesley      tamsulosin  0 4 mg Oral Daily With Pavel Ward MD          Today, Patient Was Seen By: Len Shah PA-C    **Please Note: This note may have been constructed using a voice recognition system  **

## 2022-09-21 NOTE — ASSESSMENT & PLAN NOTE
Diagnosed, 9/12  · Presently asymptomatic and never required oxygen  · Did get Bebtelovimab infusion outpatient   · No need for further treatment/isolation

## 2022-09-21 NOTE — UTILIZATION REVIEW
Initial Clinical Review    OBS 9/20 @ 6788 UPGRADED TO INPATIENT 9/21 @ 0939 FOR CONTINUED TX S/P MULTIPLE FALLS WITH NEED FOR SAFE D/C PLAN    09/21/22 0940  Inpatient Admission  Once        Transfer Service: Hospitalist       Question Answer Comment   Level of Care Med Surg        09/21/22 0939       ED Arrival Information     Expected   -    Arrival   9/20/2022 10:46    Acuity   Emergent            Means of arrival   -    Escorted by   -    Service   Hospitalist    Admission type   [address] complaint   -           Chief Complaint   Patient presents with    Fall     Fall from standing on plavix +hs unkown loc  C/o RLE pain        Initial Presentation: 74 y o  male with PMHx of T2DM, CKD IV, anemia, thrombocytopenia, liver cirrhosis s/p liver transplant, bladder cancer, post chemo and RTx, right hydroureteronephrosis with nephrostomy tube who presents to ED by EMS s/p fall at home  Per pt and wife he fell 3x overnight when getting OOB to go to the [facility]  Denies loc  Eval'd by trauma team in ED with imaging showing no acute injuries  Hgb 8 6, Hct 27 7, BUN 33, Cr 2 28  UA tr leuks  Admit to M/S/Tele unit under observation -- check orthostatic bps, telemetry  IVF's  Pain control  PT/OT evals  Continue pta home po meds  Date: 9/21  Day 2: Pt with c/o a lot of pain his R ankle  He got dizzy working with PT  Orthostatics (+)  Continue IVFs, compression stocking  Monitor BMP  Accuchecks w/ ssi  Tylenol scheduled, prn oxy  Lidocaine patch  PT/OT recommending IP rehab on d/c  Continue supportive care      ED Triage Vitals   Temperature Pulse Respirations Blood Pressure SpO2   09/20/22 1047 09/20/22 1047 09/20/22 1047 09/20/22 1047 09/20/22 1047   98 7 °F (37 1 °C) 82 17 135/75 99 %      Temp Source Heart Rate Source Patient Position - Orthostatic VS BP Location FiO2 (%)   09/20/22 1047 09/20/22 1047 09/20/22 1047 -- --   Tympanic Monitor Lying        Pain Score       09/21/22 0300       No Pain Wt Readings from Last 1 Encounters:   09/20/22 59 5 kg (131 lb 2 8 oz)     Additional Vital Signs:   Date/Time Temp Pulse Resp BP MAP (mmHg) SpO2 O2 Device Patient Position - Orthostatic VS   09/21/22 07:47:23 97 9 °F (36 6 °C) 80 17 91/56 68 97 % -- --   09/21/22 02:39:22 98 4 °F (36 9 °C) 79 18 114/59 77 99 % -- --   09/20/22 22:30:53 98 1 °F (36 7 °C) 83 20 106/61 76 97 % -- --   09/20/22 1907 -- 80 -- -- -- -- -- --   09/20/22 1700 -- 81 -- 124/70 88 97 % -- --   09/20/22 16:45:22 97 9 °F (36 6 °C) 79 -- 124/70 88 100 % -- --   09/20/22 1600 -- 104 -- -- -- -- -- --   09/20/22 1452 -- 74 23 Abnormal  131/71 96 99 % -- --   09/20/22 1437 -- 70 19 128/65 94 99 % -- --   09/20/22 1422 -- 72 16 130/72 95 99 % -- --   09/20/22 1407 -- 78 27 Abnormal  134/79 101 99 % -- --   09/20/22 1345 -- 74 15 135/70 94 100 % -- --   09/20/22 1243 -- 74 16 142/77 104 99 % None (Room air) Lying   09/20/22 1213 -- 74 14 134/75 97 99 % -- --   09/20/22 1143 -- 74 20 134/75 100 100 % -- --   09/20/22 1128 -- 76 18 144/76 102 100 % -- --   09/20/22 1113 -- 74 15 138/78 100 100 % None (Room air) --   09/20/22 1100 -- 77 16 123/72 -- 100 % None (Room air) --   09/20/22 10:56:35 -- 80 18 127/71 -- 98 % None (Room air) Lying   09/20/22 10:47:51 98 7 °F (37 1 °C) 82 17 135/75 -- 99 % None (Room air) Lying       Pertinent Labs/Diagnostic Test Results:   EKG 9/20: Normal sinus rhythm  Normal ECG    TRAUMA - CT head wo contrast   Final Result by Dave oWlfe MD (09/20 1128)      No acute intracranial abnormality  Chronic microangiopathic changes  TRAUMA - CT spine cervical wo contrast   Final Result by Dave Wolfe MD (09/20 1128)      No cervical spine fracture or traumatic malalignment  XR trauma multiple   Final Result by Ramandeep Piedra MD (09/20 1248)      No acute cardiopulmonary disease within limitations of supine imaging  No acute osseous abnormality of the ankle, tibia, or fibula  XR chest 1 view    (Results Pending)   XR ankle 2 vw right    (Results Pending)   XR tibia fibula 2 vw right    (Results Pending)         Results from last 7 days   Lab Units 09/21/22  0521 09/20/22  1210   WBC Thousand/uL 5 63 5 93   HEMOGLOBIN g/dL 8 2* 8 6*   HEMATOCRIT % 27 4* 27 7*   PLATELETS Thousands/uL 142* 142*   NEUTROS ABS Thousands/µL  --  3 60     Results from last 7 days   Lab Units 09/21/22  0521 09/20/22 2241 09/20/22  1210   SODIUM mmol/L 136 137 138   POTASSIUM mmol/L 4 6 4 6 4 3   CHLORIDE mmol/L 105 106 108   CO2 mmol/L 26 27 26   ANION GAP mmol/L 5 4 4   BUN mg/dL 30* 31* 33*   CREATININE mg/dL 2 20* 2 24* 2 28*   EGFR ml/min/1 73sq m 28 27 27   CALCIUM mg/dL 8 3 8 2* 8 5   MAGNESIUM mg/dL  --  1 6  --      Results from last 7 days   Lab Units 09/21/22  0521 09/20/22  1210   AST U/L 18  --    ALT U/L 18  --    ALK PHOS U/L 71  --    TOTAL PROTEIN g/dL 7 2  --    ALBUMIN g/dL 2 6*  --    TOTAL BILIRUBIN mg/dL 0 30  --    AMMONIA umol/L  --  23     Results from last 7 days   Lab Units 09/21/22  0745 09/20/22 2059 09/20/22  1710   POC GLUCOSE mg/dl 84 171* 103     Results from last 7 days   Lab Units 09/21/22  0521 09/20/22 2241 09/20/22  1210   GLUCOSE RANDOM mg/dL 103 170* 87     Results from last 7 days   Lab Units 09/20/22  1612 09/20/22  1452 09/20/22  1210   HS TNI 0HR ng/L  --   --  8   HS TNI 2HR ng/L  --  7  --    HSTNI D2 ng/L  --  -1  --    HS TNI 4HR ng/L 9  --   --    HSTNI D4 ng/L 1  --   --      Results from last 7 days   Lab Units 09/20/22  1210   PROTIME seconds 14 2   INR  1 08   PTT seconds 29     Results from last 7 days   Lab Units 09/20/22  1454   CLARITY UA  Clear   COLOR UA  Colorless   SPEC GRAV UA  1 011   PH UA  7 0   GLUCOSE UA mg/dl Negative   KETONES UA mg/dl Negative   BLOOD UA  Negative   PROTEIN UA mg/dl Trace*   NITRITE UA  Negative   BILIRUBIN UA  Negative   UROBILINOGEN UA (BE) mg/dl <2 0   LEUKOCYTES UA  Trace*   WBC UA /hpf 2-4*   RBC UA /hpf 1-2 BACTERIA UA /hpf None Seen   EPITHELIAL CELLS WET PREP /hpf None Seen       ED Treatment:   Medication Administration from 09/20/2022 1043 to 09/20/2022 1635       Date/Time Order Dose Route Action     09/20/2022 1244 HYDROmorphone HCl (DILAUDID) injection 0 2 mg 0 2 mg Intravenous Given     Past Medical History:   Diagnosis Date    Anemia     [facility] ([facility] )     Hydronephrosis of right kidney     Liver cirrhosis ([place] [address] )     Liver transplant status ([place] [address] )     Renal disorder     Stroke ([place] [address] )     Thrombocytopenia ([place] [address] )      Present on Admission:   Stage 4 chronic kidney disease ([place] [address] )   Anemia due to chronic kidney disease   Thrombocytopenia (HCC)      Admitting Diagnosis: Fall, initial encounter [W19  XXXA]  Ambulatory dysfunction [R26 2]  Age/Sex: 74 y o  male  Admission Orders:  Scheduled Medications:  clopidogrel, 75 mg, Oral, Daily  heparin (porcine), 5,000 Units, Subcutaneous, Q8H [facility]  insulin detemir, 15 Units, Subcutaneous, HS  insulin lispro, 1-5 Units, Subcutaneous, TID AC  insulin lispro, 1-5 Units, Subcutaneous, HS  phenazopyridine, 100 mg, Oral, Once per day on Mon Wed Fri  pregabalin, 50 mg, Oral, TID  tacrolimus, 1 mg, Oral, Q12H [facility]  tamsulosin, 0 4 mg, Oral, Daily With Dinner    Continuous IV Infusions:  multi-electrolyte, 100 mL/hr, Intravenous, Continuous    PRN Meds:  acetaminophen, 650 mg, Oral, Q6H PRN  ondansetron, 4 mg, Intravenous, Q6H PRN  oxyCODONE, 2 5 mg, Oral, Q4H PRN  senna, 1 tablet, Oral, HS PRN        Network Utilization Review Department  ATTENTION: Please call with any questions or concerns to [phone] and carefully listen to the prompts so that you are directed to the right person  All voicemails are confidential   [facility] all requests for admission clinical reviews, approved or denied determinations and any other requests to dedicated fax number below belonging to the campus where the patient is receiving treatment   List of dedicated fax numbers for the Facilities:  FACILITY NAME UR FAX NUMBER   ADMISSION DENIALS (Administrative/Medical Necessity) [phone] [address] (Maternity/NICU/Pediatrics) 261 [facility],7Th Floor [facility] [address] [place] [address]  [phone]   [address] 50 [address] [place] [address] [id number] [address] [facility] [place] [address] [phone]

## 2022-09-21 NOTE — PROGRESS NOTES
MSW made outreach to pt's wife to assess need, to offer support and to explain there will be a change of OSW  Pt's wife sounded overwhelmed as she expressed all of issues with the pt  Wife states that the pt had a fall and she had to call EMT  Pt also still dealing with COVID  Pt's wife expressed frustration as the pt was recommended for STR and pt is refusing  Wife feels as though pt needs to go to rehab as she does not feel he is strong enough to return home at this time  Pt's wife sounding tired  MSW encouraged her to practice self care as she has been missing her own medical appointments  Pt's wife states that Merry has not yet approved the Ensure  MSW to follow up with this  MSW also notified pt's wife that LILLI Pratt-OSW will be following the case  Pt's wife verbalized understanding

## 2022-09-21 NOTE — ASSESSMENT & PLAN NOTE
Hx of Right-sided hydroureteronephrosis, status post right-sided nephrostomy tube in May 2022  · S/p present CT a/p no r hydronephrosis

## 2022-09-21 NOTE — CASE MANAGEMENT
Case Management Discharge Planning Note    Patient name Nicole Reed  Location [address] 903/PPHP 707-03 MRN [id number]  : 1950 Date 2022       Current Admission Date: 2022  Current Admission Diagnosis:Multiple falls   Patient Active Problem List    Diagnosis Date Noted    Right ankle pain 2022    Orthostatic hypotension 2022    Hydronephrosis 2022    Multiple falls 2022    Stage 4 chronic kidney disease ([facility] ) 2022    Anemia due to chronic kidney disease 2022    Thrombocytopenia ([facility] ) 2022    History of bladder cancer 2022    History of liver transplant ([place] [address] ) 2022    Controlled type 2 diabetes mellitus with diabetic neuropathy, with long-term current use of insulin ([place] [address] ) 2022    History of CVA (cerebrovascular accident) 2022    History of COVID-19 2022      LOS (days): 0  Geometric Mean LOS (GMLOS) (days): 4 00  Days to GMLOS:3 9     OBJECTIVE:            Current admission status: Inpatient   Preferred Pharmacy: No Pharmacies Listed  Primary Care Provider: Ginger Miller DO    [facility] [id number] [facility]  Secondary Insurance:     DISCHARGE DETAILS:    Discharge planning discussed with[de-identified] Patient  Freedom of Choice: Yes  Comments - Freedom of Choice: Discussed [facility]         Is the patient interested in [facility] at discharge?: Yes  [address] requested[de-identified] Occupational Therapy, Physical [address] Name[de-identified] [facility] Provider[de-identified] PCP  Home Health Services Needed[de-identified] Evaluate Functional Status and Safety, Strengthening/Theraputic Exercises to Improve Function  Homebound Criteria Met[de-identified] Uses an Assist Device (i e  cane, walker, etc), Requires the Assistance of Another Person for Safe Ambulation or to Leave the Home  Supporting Clincal Findings[de-identified] Fatigues Easliy in [employer], Limited Endurance    Other Referral/Resources/Interventions Provided:  Referral Comments: [facility] able to accept, patient in agreement

## 2022-09-21 NOTE — PHYSICAL THERAPY NOTE
Physical Therapy Tx Session:       09/21/22 1220   PT Last Visit   PT Visit Date 09/21/22   Note Type   Note Type Treatment   Pain Assessment   Pain Assessment Tool 0-10   Pain Score 10 - Worst Possible Pain   Pain Location/Orientation Orientation: Right;Location: Leg;Other (Comment)  (R ankle area, tender to touch and unable to [facility])   Hospital Pain Intervention(s) Repositioned; Ambulation/increased activity; Emotional support; Rest   Restrictions/Precautions   Other Precautions Chair Alarm; Bed Alarm; Impulsive;Multiple lines;Telemetry; Fall Risk;Pain   General   Additional Pertinent History s/p multiple falls that occur at night per pt while getting OOB  Pt is on Plavix at the time of the falls  CT head and cspine(-); R ankle tib/fib XRAY (-)  Pt tested (+)for COVID 09/12/22 receiving outpt treatment  Family/Caregiver Present No   Cognition   Overall Cognitive Status WFL   Orientation Level Oriented X4   Following Commands Follows one step commands with increased time or repetition  (2* inc pain RLE)   Subjective   Subjective pt supine in bed resting comfortably;pt willing and agreeable to participate in PT eval and get OOB to eat lunch  "I can try, but my right leg hurts down by my ankle"  Bed Mobility   Supine to Sit 5  Supervision   Additional items Assist x 1;HOB elevated; Bedrails;Verbal cues   Transfers   Sit to Stand 3  Moderate assistance   Additional items Assist x 1;HOB elevated; Bedrails; Increased time required;Verbal cues   Stand to Sit 3  Moderate assistance   Additional items Assist x 1; Armrests; Increased time required;Verbal cues   Ambulation/Elevation   Gait pattern Poor UE support; Improper Weight shift; Antalgic;Narrow WENDI; Forward Flexion;Decreased R stance; Inconsistent michael; Foward flexed; Short stride; Ataxia; Step to;Excessively slow   Gait Assistance 3  Moderate assist   Additional items Assist x 1; Tactile cues; Verbal cues   Assistive Device Rolling walker   Distance pt able to ambulate/hop on LLE bed->chair with use of RW approximately 5 steps  Pt unable to WB RLE 2* inc pain and discomfort   Balance   Static Sitting Fair +  (chair alarm intact prior to leaving pts room)   Dynamic Sitting Poor   Static Standing Poor   Dynamic Standing Poor   Ambulatory Poor   Endurance Deficit   Endurance Deficit Yes   Endurance Deficit Description weakness,pain,fatigue, multiple recent falls PTA   Activity Tolerance   Activity Tolerance Patient limited by fatigue;Patient limited by pain  (poor)   Nurse Made Aware yes   Assessment   Prognosis Fair   Problem List Decreased strength;Decreased range of motion;Decreased endurance; Impaired balance;Decreased mobility; Decreased coordination;Decreased skin integrity;Pain   Assessment Pt is a 75 y/o male admitted to B 2* s/p multiple falls at home, PTA, when trying to get OOB  Pt lives with wife in 3 SH, (+) NICOLAS and reports no use of personal DME for mobility PTA  Pt reports multiple falls while trying to get OOB and pt reports (+)head strike  Pt currently is not at functional mobility baseline, needs significant A to get OOB 2* inc pain RLE and ataxic/unsteady movements and gait pattern, multiple lines, masimo monitoring  Pt demonstrates moderate deficits during functional mobility and gait including dec endurance, dec balance, dec BLE strength (RLE>LLE),inc pain medial R ankle area during WB/at rest and tender to touch, and needs S for BM and mod AX1 for transfers and GT with use of RW  Pt able to hop on LLE during gait movement 2* inability to WB RLE 2* inc pain  Nursing made aware  Pt would cont to benefit from skilled inpt PT services to maximize functional independence and to dec caregiver burden  Goals   Patient Goals to eat lunch   PT Treatment Day 1   Plan   Treatment/Interventions Functional transfer training; Endurance training;Patient/family training;Equipment eval/education; Bed mobility;Gait training;Spoke to nursing   Progress Progressing toward goals Recommendation   PT Discharge Recommendation Post acute rehabilitation services   Equipment Recommended [facility] Recommended Wheeled walker   Change/add to [facility] Navigation?  No   AM-PAC Basic Mobility Inpatient   Turning in Bed Without Bedrails 3   Lying on Back to Sitting on Edge of Flat Bed 3   Moving Bed to Chair 2   Standing Up From Chair 2   Walk in Room 2   Climb 3-5 Stairs 1   Basic Mobility Inpatient Raw Score 13   Basic Mobility Standardized Score 33 99   Highest Level Of Mobility   [facility] Goal 4: Move to chair/commode

## 2022-09-21 NOTE — PLAN OF CARE
Problem: PHYSICAL THERAPY ADULT  Goal: Performs mobility at highest level of function for planned discharge setting  See evaluation for individualized goals  Description: Treatment/Interventions: Functional transfer training, Endurance training, Patient/family training, Equipment eval/education, Bed mobility, Gait training, Spoke to nursing  Equipment Recommended: Kymberly Izaguirre       See flowsheet documentation for full assessment, interventions and recommendations  Note: Prognosis: Fair  Problem List: Decreased strength, Decreased range of motion, Decreased endurance, Impaired balance, Decreased mobility, Decreased coordination, Decreased skin integrity, Pain  Assessment: Pt seen for high complexity PT evaluation  Pt with active PT eval/treat and up with assistance orders  Pt is a 74 y o  male who was [facility] to [facility] on 9/20/2022 with multiple falls  Pt cleared from trauma services with negative workup  Pt's current dx/ problem list include: CKD, anemia, hx of bladder CA, hx of liver transplant, type 2 DM, hx of CVA, hx of COVID-19, R ankle pain, orthostatic hypotension, hydronephrosis  Comorbidities affecting pt's physical performance at time of assessment are as follows:  has a past medical history of Anemia, Bladder cancer ([facility] ), Hydronephrosis of right kidney, Liver cirrhosis ([facility] ), Liver transplant status ([facility] ), Renal disorder, Stroke ([facility] ), and Thrombocytopenia ([facility] )  Personal factors affecting pt at time of initial examination include: steps to enter environment, limited home support, advanced age, past experience, inability to perform IADLs, inability to perform ADLs, inability to ambulate household distances, and recent fall(s)    Due to acute medical issues, ongoing medical workup for primary dx; pain, fall risk, increased reliance on more restrictive AD compared to baseline;  decreased activity tolerance compared to baseline, increased assistance needed from caregiver at current time, continuous telemetry monitoring, multiple lines, decline in overall functional mobility status; health management issues; note unstable clinical picture (high complexity)  At baseline, pt resides with spouse in 2 SH with 4 NICOLAS (FFSU available) and was independent without AD prior to hospital admission  Currently, upon initial examination, pt  is requiring min A for bed mobility skills; mod Ax2 for functional transfers  Currently, pt presents functioning below baseline and with overall mobility deficits 2* to: decreased LE strength/AROM; limited flexibility;  generalized weakness/ deconditioning; decreased endurance; decreased activity tolerance; decreased coordination; impaired balance; gait deviations; bed/ chair alarms; multiple lines; as well as : weakness, decreased ROM, impaired balance, decreased endurance, impaired coordination, pain, decreased activity tolerance, decreased functional mobility tolerance and fall risk  Pt currently at increased risk for falls  At the end of evaluation, pt was left supine in bed with all needs in reach  Pt would benefit from continued skilled PT services while in hospital to address remaining limitations and maximize functional potential  PT to continue to follow pt and recommends rehab upon d/c  The patient's AM-PAC Basic Mobility Inpatient Short Form Raw Score is 13  A Raw score of less than or equal to 16 suggests the patient may benefit from discharge to post-acute rehabilitation services  Please also refer to the recommendation of the Physical Therapist for safe discharge planning  Barriers to Discharge: Inaccessible home environment     PT Discharge Recommendation: Post acute rehabilitation services    See flowsheet documentation for full assessment

## 2022-09-21 NOTE — ASSESSMENT & PLAN NOTE
Likely in setting of poor PO intake last few days (recent COVID infection)  · Orthostatics + with PT on 9/21 AM  · IVF, compression stockings  · Ambien/restorial held  · Continue flomax but could be contributing

## 2022-09-21 NOTE — PHYSICAL THERAPY NOTE
Physical Therapy Evaluation     Patient's Name: Gayle Randall    Admitting Diagnosis  Fall, initial encounter [W19  XXXA]  Ambulatory dysfunction [R26 2]    Problem List  Patient Active Problem List   Diagnosis    Multiple falls    Stage 4 chronic kidney disease ([facility] )    Anemia due to chronic kidney disease    Thrombocytopenia (HCC)    History of bladder cancer    History of liver transplant ([place] [address] )    Controlled type 2 diabetes mellitus with diabetic neuropathy, with long-term current use of insulin (HCC)    History of CVA (cerebrovascular accident)    History of COVID-19    Right ankle pain    Orthostatic hypotension    Hydronephrosis       Past Medical History  Past Medical History:   Diagnosis Date    Anemia     Bladder cancer ([place] [address] )     Hydronephrosis of right kidney     Liver cirrhosis (HCC)     Liver transplant status ([place] [address] )     Renal disorder     Stroke (HCC)     Thrombocytopenia (HCC)        Past Surgical History  Past Surgical History:   Procedure Laterality Date    HEPATITIS B SURFACE AB QN,LIVER TRANSPLANT (HISTORICAL)            09/21/22 0908   PT Last Visit   PT Visit Date 09/21/22   Note Type   Note type Evaluation   Pain Assessment   Pain Assessment Tool 0-10   Pain Score 8   Pain Location/Orientation Orientation: Right;Location: Leg  (ankle; imaging (-) for acute abnormality per trauma work-up)   Hospital Pain Intervention(s) Repositioned; Ambulation/increased activity; Emotional support   Restrictions/Precautions   Weight Bearing Precautions Per Order No   Other Precautions Chair Alarm; Bed Alarm;Multiple lines; Fall Risk;Pain   Home Living   Type of [address] [place] [address] Two level;Performs ADLs on one level; Able to live on main level with bedroom/bathroom;Stairs to enter with rails  (4 NICOLAS; FFSU available per pt report)   Bathroom Shower/Tub Tub/shower unit   [employer]   [address]  (did not use PTA)   Prior Function   Level of [place] Independent with ADLs and functional mobility   Lives With Spouse;Daughter   Receives Help From Family   ADL Assistance Independent   IADLs Independent   Falls in the last 6 months 1 to 4  (4 per pt report)   Vocational Retired   General   Family/Caregiver Present No   Cognition   Overall Cognitive Status WFL   Arousal/Participation Alert   Attention Attends with cues to redirect   Orientation Level Oriented to person;Oriented to place;Oriented to situation  (oriented to year)   Memory Within functional limits   Following Commands Follows all commands and directions without difficulty   Comments pt pleasant and cooperative to participate in therapy session   RLE Assessment   RLE Assessment   (functionally 3/5; limited 2* increased R ankle pain)   LLE Assessment   LLE Assessment   (functionally 4/5)   Light Touch   RLE Light Touch Grossly intact   LLE Light Touch Grossly intact   Bed Mobility   Supine to Sit 4  Minimal assistance   Additional items Assist x 1;HOB elevated; Bedrails; Increased time required;Verbal cues   Sit to Supine 4  Minimal assistance   Additional items Assist x 1; Increased time required;Verbal cues;LE management   Additional Comments pt supine in bed upon arrival  Pt returned to supine in bed with all needs wihtin reach at the end of therapy session  (supine BP taken: 120/67)   Transfers   Sit to Stand 3  Moderate assistance   Additional items Assist x 2; Increased time required;Verbal cues   Stand to Sit 3  Moderate assistance   Additional items Assist x 2; Increased time required;Verbal cues   Additional Comments (S)  pt sitting EOB with some c/o lightheadness, BP taken: 104/ 63  Symptoms resolve with time  Pt completes STS with b/l HHA  Pt maintained NWB through RLE 2* increased pain  Standing BP taken: 80/34; symptomatic- pt with complaints of dizziness  Pt returned to supine in bed  PT to continue to follow and assess ambulation as appropriate and able   RN updated and aware   Balance   Static Sitting Fair +   Dynamic Sitting Fair Static Standing Poor +   Dynamic Standing Poor   Endurance Deficit   Endurance Deficit Yes   Activity Tolerance   Activity Tolerance Patient limited by fatigue  (+ symptomatic orthostatic hypotension)   Medical Staff Made Aware OT; co-eval performed this date 2* increased medical complexity and multiple co-morbidities   Nurse Made Aware RN cleared pt to participate in therapy session   Assessment   Prognosis Fair   Problem List Decreased strength;Decreased range of motion;Decreased endurance; Impaired balance;Decreased mobility;Pain   Assessment Pt seen for high complexity PT evaluation  Pt with active PT eval/treat and up with assistance orders  Pt is a 74 y o  male who was admitted to [facility] on 9/20/2022 with multiple falls  Pt cleared from trauma services with negative workup  Pt's current dx/ problem list include: CKD, anemia, hx of bladder CA, hx of liver transplant, type 2 DM, hx of CVA, hx of COVID-19, R ankle pain, orthostatic hypotension, hydronephrosis  Comorbidities affecting pt's physical performance at time of assessment are as follows:  has a past medical history of Anemia, Bladder cancer ([facility] ), Hydronephrosis of right kidney, Liver cirrhosis ([facility] ), Liver transplant status ([facility] ), Renal disorder, Stroke ([facility] ), and Thrombocytopenia ([facility] )  Personal factors affecting pt at time of initial examination include: steps to enter environment, limited home support, advanced age, past experience, inability to perform IADLs, inability to perform ADLs, inability to ambulate household distances, and recent fall(s)    Due to acute medical issues, ongoing medical workup for primary dx; pain, fall risk, increased reliance on more restrictive AD compared to baseline;  decreased activity tolerance compared to baseline, increased assistance needed from caregiver at current time, continuous telemetry monitoring, multiple lines, decline in overall functional mobility status; health management issues; note unstable clinical picture (high complexity)  At baseline, pt resides with spouse in 2  with 4 NICOLAS (FFSU available) and was independent without AD prior to hospital admission  Currently, upon initial examination, pt  is requiring min A for bed mobility skills; mod Ax2 for functional transfers  Currently, pt presents functioning below baseline and with overall mobility deficits 2* to: decreased LE strength/AROM; limited flexibility;  generalized weakness/ deconditioning; decreased endurance; decreased activity tolerance; decreased coordination; impaired balance; gait deviations; bed/ chair alarms; multiple lines; as well as : weakness, decreased ROM, impaired balance, decreased endurance, impaired coordination, pain, decreased activity tolerance, decreased functional mobility tolerance and fall risk  Pt currently at increased risk for falls  At the end of evaluation, pt was left supine in bed with all needs in reach  Pt would benefit from continued skilled PT services while in hospital to address remaining limitations and maximize functional potential  PT to continue to follow pt and recommends rehab upon d/c  The patient's AM-PAC Basic Mobility Inpatient Short Form Raw Score is 13  A Raw score of less than or equal to 16 suggests the patient may benefit from discharge to post-acute rehabilitation services  Please also refer to the recommendation of the Physical Therapist for safe discharge planning  Barriers to Discharge Inaccessible home environment   Goals   Patient Goals to have less pain   STG Expiration Date 10/05/22   Short Term Goal #1 STG 1  Pt will be able to perform bed mobility tasks with mod I level in order to improve overall functional mobility and assist in safe d/c  STG 2  Pt with sit EOB for at least 25 minutes at mod I level in order to strengthen abdominal musculature and assist in future transfers/ ambulation  STG 3   Pt will be able to perform functional transfer with mod I level in order to improve overall functional mobility and assist in safe d/c  STG 4  Pt will be able to ambulate at least 250 feet with least restrictive device with mod I level A in order to improve overall functional mobility and assist in safe d/c  STG 5  Pt will improve sitting/standing static/dynamic balance 1/2 grade in order to improve functional mobility and assist in safe d/c  STG 6  Pt will improve LE strength by 1/2 grade in order to improve functional mobility and assist in safe d/c  STG 7  Pt will be able to negotiate at least 4 stairs with least restrictive device with mod I level A in order to improve overall functional mobility and assist in safe d/c    PT Treatment Day 0   Plan   Treatment/Interventions Functional transfer training;LE strengthening/ROM; Endurance training;Patient/family training;Bed mobility;Spoke to nursing;Spoke to case management;OT   PT Frequency 3-5x/wk   Recommendation   PT Discharge Recommendation Post acute rehabilitation services   Equipment Recommended [facility] Recommended Wheeled walker   AM-PAC Basic Mobility Inpatient   Turning in Bed Without Bedrails 3   Lying on Back to Sitting on Edge of Flat Bed 3   Moving Bed to Chair 2   Standing Up From Chair 2   Walk in Room 1   Climb 3-5 Stairs 1   Basic Mobility Inpatient Raw Score 12   Basic Mobility Standardized Score 32 23   Highest Level Of Mobility   JH-HLM Goal 4: Move to chair/commode   JH-HLM Achieved 5: Stand (1 or more minutes)   End of Consult   Patient Position at End of Consult Supine; All needs within reach         Benita Tang, PT DPT

## 2022-09-22 ENCOUNTER — APPOINTMENT (INPATIENT)
Dept: NON INVASIVE DIAGNOSTICS | Facility: HOSPITAL | Age: 72
DRG: 641 | End: 2022-09-22
Payer: COMMERCIAL

## 2022-09-22 VITALS
SYSTOLIC BLOOD PRESSURE: 111 MMHG | RESPIRATION RATE: 18 BRPM | WEIGHT: 131.17 LBS | OXYGEN SATURATION: 99 % | HEART RATE: 63 BPM | DIASTOLIC BLOOD PRESSURE: 64 MMHG | WEIGHT: 131.17 LBS | HEART RATE: 63 BPM | TEMPERATURE: 97.3 F | OXYGEN SATURATION: 99 % | DIASTOLIC BLOOD PRESSURE: 64 MMHG | RESPIRATION RATE: 18 BRPM | TEMPERATURE: 97.3 F | SYSTOLIC BLOOD PRESSURE: 111 MMHG

## 2022-09-22 PROBLEM — E86.0 DEHYDRATION: Status: ACTIVE | Noted: 2022-09-22

## 2022-09-22 LAB
GLUCOSE SERPL-MCNC: 105 MG/DL (ref 65–140)
GLUCOSE SERPL-MCNC: 105 MG/DL (ref 65–140)
GLUCOSE SERPL-MCNC: 52 MG/DL (ref 65–140)
GLUCOSE SERPL-MCNC: 52 MG/DL (ref 65–140)
URATE SERPL-MCNC: 4.9 MG/DL (ref 3.5–8.5)
URATE SERPL-MCNC: 4.9 MG/DL (ref 3.5–8.5)

## 2022-09-22 PROCEDURE — 82948 REAGENT STRIP/BLOOD GLUCOSE: CPT

## 2022-09-22 PROCEDURE — NC001 PR NO CHARGE: Performed by: INTERNAL MEDICINE

## 2022-09-22 PROCEDURE — 99239 HOSP IP/OBS DSCHRG MGMT >30: CPT | Performed by: INTERNAL MEDICINE

## 2022-09-22 PROCEDURE — 84550 ASSAY OF BLOOD/URIC ACID: CPT | Performed by: INTERNAL MEDICINE

## 2022-09-22 PROCEDURE — 93971 EXTREMITY STUDY: CPT

## 2022-09-22 RX ORDER — TAMSULOSIN HYDROCHLORIDE 0.4 MG/1
0.4 CAPSULE ORAL
COMMUNITY
End: 2022-10-24

## 2022-09-22 RX ORDER — ACETAMINOPHEN 325 MG/1
975 TABLET ORAL EVERY 8 HOURS SCHEDULED
Refills: 0
Start: 2022-09-22

## 2022-09-22 RX ORDER — TEMAZEPAM 30 MG/1
30 CAPSULE ORAL
COMMUNITY
End: 2022-09-22

## 2022-09-22 RX ORDER — ROSUVASTATIN CALCIUM 40 MG/1
40 TABLET, COATED ORAL DAILY
COMMUNITY

## 2022-09-22 RX ORDER — CLOPIDOGREL BISULFATE 75 MG/1
75 TABLET ORAL DAILY
COMMUNITY

## 2022-09-22 RX ORDER — ZOLPIDEM TARTRATE 10 MG/1
10 TABLET ORAL
COMMUNITY
End: 2022-09-22

## 2022-09-22 RX ORDER — PREGABALIN 50 MG/1
50 CAPSULE ORAL 3 TIMES DAILY
COMMUNITY

## 2022-09-22 RX ORDER — TACROLIMUS 1 MG/1
1 CAPSULE ORAL EVERY 12 HOURS SCHEDULED
COMMUNITY

## 2022-09-22 RX ORDER — LOPERAMIDE HYDROCHLORIDE 2 MG/1
2 CAPSULE ORAL 4 TIMES DAILY PRN
COMMUNITY

## 2022-09-22 RX ADMIN — TACROLIMUS 1 MG: 1 CAPSULE ORAL at 08:09

## 2022-09-22 RX ADMIN — LIDOCAINE 5% 1 PATCH: 700 PATCH TOPICAL at 08:07

## 2022-09-22 RX ADMIN — CLOPIDOGREL BISULFATE 75 MG: 75 TABLET ORAL at 08:07

## 2022-09-22 RX ADMIN — PREGABALIN 50 MG: 50 CAPSULE ORAL at 08:07

## 2022-09-22 NOTE — ASSESSMENT & PLAN NOTE
Likely in setting of poor PO intake last few days (recent COVID infection)  · Orthostatics + with PT on 9/21 AM  · IVF, compression stockings added  · Ambien/restorial held  · Continue flomax but could be contributing  · Repeat on 9/23 prior to anticipated discharge     LEAVING AMA

## 2022-09-22 NOTE — RESTORATIVE TECHNICIAN NOTE
Restorative Technician Note      Patient Name: Grant Villa     Restorative Tech Visit Date: 9/22/2022  Note Type: Mobility  Patient Position Upon Consult: Supine  Activity Performed: Ambulated  Assistive Device: Roller walker  Patient Position at End of Consult: Supine;  All needs within [facility]

## 2022-09-22 NOTE — DISCHARGE SUMMARY
[facility] [facility]  Discharge- Myrtice Sow 1950, 74 y o  male MRN: [id number]  Unit/Bed#: [facility] 903-01 Encounter: [id number]  Primary Care Provider: Tamra Vieyra DO   Date and time admitted to hospital: 9/20/2022 10:46 AM    * Multiple falls  Assessment & Plan  Presented with 3 falls in 1 night  Denies LOC  · Evaluated by trauma team in ER  · CT scan of the head, CT C spine neg for acute injuries  · CXR, R tib/fib, R ankle xray and R foot xray negative for acute abnormalities   · Falls occurred when getting OOB and according to the patient's daughter, patient has not been adequately drinking fluids  · Patient also on Flomax at bedtime, as well as temazepam and Ambien--likely 2/2 orthostatic hypotension  · Orthostatic BPs + AM of 9/21 with PT--continue IVF  · Added compression stockings  · Hold Ambien and temazepam   · Will order REPEAT orthostatics for AM of 9/23 prior to anticipated discharge  · Telemetry without events, discontinued on 9/21  Troponins negative, EKG NSR  · PT/OT recommending rehab however patient prefers [facility]    Ultimately decided to leave AMA   Risks discussed, signed off     Hydronephrosis  Assessment & Plan  Hx of Right-sided hydroureteronephrosis, status post right-sided nephrostomy tube in May 2022  · S/p present CT a/p no r hydronephrosis      Orthostatic hypotension  Assessment & Plan  Likely in setting of poor PO intake last few days (recent COVID infection)  · Orthostatics + with PT on 9/21 AM  · IVF, compression stockings added  · Ambien/restorial held  · Continue flomax but could be contributing  · Repeat on 9/23 prior to anticipated discharge     LEAVING AMA     Right ankle pain  Assessment & Plan  F/u xrays done on admission were negative for acute findings, slightly improving as of 9/22  · Continue scheduled tylenol  · PRN oxy as per geriatric pain management set  · Lido patch   · Check uric acid and venous duplex given mild edema    Leaving AMA prior to results    History of COVID-19  Assessment & Plan  Diagnosed, 9/12  · Presently asymptomatic and never required oxygen  · Did get Bebtelovimab infusion outpatient   · No need for further treatment/isolation   · Likely contributed to primary problem as above    Dehydration  Assessment & Plan  · POA, in setting of mulitple falls, a/e/b poor oral intake, orthostatic hypotension  In setting of recent COVID-19  Treated with IVF, ortho BP checks, PT/OT and compression stockings    History of CVA (cerebrovascular accident)  Assessment & Plan  Continue Plavix  · Not on atorvastatin due to allergy  Controlled type 2 diabetes mellitus with diabetic neuropathy, with long-term current use of insulin [facility])  Assessment & Plan  No results found for: HGBA1C    Recent Labs     09/21/22  1619 09/21/22  2109 09/22/22  0804 09/22/22  1109   POCGLU 146* 158* 52* 105       Blood Sugar Average: Last 72 hrs:  (P) 116  · With severe hypoglycemia AM 9/22 however asymptomatic   · HOLD levemir 15 units HS for now  · SSI/accuchecks    History of liver transplant ([facility] )  Assessment & Plan  Continue tacrolimus  · Monitor CMP    History of bladder cancer  Assessment & Plan  Follows with oncologist and palliative care specialist   · Had chemotherapy and radiation treatment  · History of right hydroureteronephrosis, with right nephrostomy tube  Thrombocytopenia (HCC)  Assessment & Plan  Mild, chronic and stable   · No bleeding     Anemia due to chronic kidney disease  Assessment & Plan  Per chart review chronic and stable  · Monitor  · No active bleeding      Stage 4 chronic kidney disease [facility])  Assessment & Plan  Lab Results   Component Value Date    EGFR 28 09/21/2022    EGFR 27 09/20/2022    EGFR 27 09/20/2022    CREATININE 2 20 (H) 09/21/2022    CREATININE 2 24 (H) 09/20/2022    CREATININE 2 28 (H) 09/20/2022     · Likely baseline creatinine is around 2 4 to 2 7 from chart review   · Continue IVF  · Monitor BMP      Medical Problems             Resolved Problems  Date Reviewed: 9/22/2022   None               Discharging Physician / Practitioner: Misael Romo PA-C  PCP: Theo Mckeon DO  Admission Date:   Admission Orders (From admission, onward)     Ordered        09/21/22 0939  Inpatient Admission  Once            09/20/22 1235  Place in Observation  Once                      Discharge Date: 09/22/22    Consultations During Hospital Stay:  · See chart    Procedures Performed:   · See chart    Significant Findings / Test Results:   · See chart    Incidental Findings:   · See chart     Test Results Pending at Discharge (will require follow up):   · none     Outpatient Tests Requested:  · F/u all specialists    Complications:  Left AMA    Reason for Admission: falls, orthostatic hypotension    Hospital Course:   Elzie Buerger is a 74 y o  male patient who originally presented to the hospital on 9/20/2022 due to falls and orthostatic hypotension  See details above  Patient left AMA  Risks discussed with him and family  Refused rehab  Please see above list of diagnoses and related plan for additional information  Condition at Discharge: fair    Discharge Day Visit / Exam:   * Please refer to separate progress note for these details *    Discussion with Family: Updated  (wife and daughters ) at bedside  Discharge instructions/Information to patient and family:   See after visit summary for information provided to patient and family  Provisions for Follow-Up Care:  See after visit summary for information related to follow-up care and any pertinent home health orders  Disposition:   Other: left AMA-home [facility]    Planned Readmission: no     Discharge Statement:  I spent 34 minutes discharging the patient  This time was spent on the day of discharge  I had direct contact with the patient on the day of discharge   Greater than 50% of the total time was spent examining patient, answering all patient questions, arranging and discussing plan of care with patient as well as directly providing post-discharge instructions  Additional time then spent on discharge activities  Discharge Medications:  See after visit summary for reconciled discharge medications provided to patient and/or family        **Please Note: This note may have been constructed using a voice recognition system**

## 2022-09-22 NOTE — DISCHARGE INSTR - OTHER ORDERS
Wound Care Plan:   1-Hydraguard lotion to bilateral buttocks, sacrum, and heels twice daily and as needed  2-Elevate heels off of bed/chair surface to offload pressure  3-Offloading air cushion in chair when out of bed  4-Moisturize skin daily with skin nourishing cream   5-Turn/reposition every 2 hours while in bed and weight shift frequently while in chair for pressure re-distribution on skin  6-Right arm tears--cleanse with normal saline, pat dry  Apply Dermagran to wound and cover with ABD, wrap with macie  Change dressing every other day

## 2022-09-22 NOTE — ASSESSMENT & PLAN NOTE
F/u xrays done on admission were negative for acute findings, slightly improving as of 9/22  · Continue scheduled tylenol  · PRN oxy as per geriatric pain management set  · Lido patch   · Check uric acid and venous duplex given mild edema

## 2022-09-22 NOTE — ASSESSMENT & PLAN NOTE
· POA, in setting of mulitple falls, a/e/b poor oral intake, orthostatic hypotension  In setting of recent COVID-19   Treated with IVF, ortho BP checks, PT/OT and compression stockings

## 2022-09-22 NOTE — PLAN OF CARE
Problem: Prexisting or High Potential for Compromised Skin Integrity  Goal: Skin integrity is maintained or improved  Description: INTERVENTIONS:  - Identify patients at risk for skin breakdown  - Assess and monitor skin integrity  - Assess and monitor nutrition and hydration status  - Monitor labs   - Assess for incontinence   - Turn and reposition patient  - Assist with mobility/ambulation  - Relieve pressure over bony prominences  - Avoid friction and shearing  - Provide appropriate hygiene as needed including keeping skin clean and dry  - Evaluate need for skin moisturizer/barrier cream  - Collaborate with interdisciplinary team   - Patient/family teaching  - Consider wound care consult   9/22/2022 1309 by Sriram Cisneros RN  Outcome: Adequate for Discharge  9/22/2022 1139 by Sriram Cisneros RN  Outcome: Progressing     Problem: MOBILITY - ADULT  Goal: Maintain or return to baseline ADL function  Description: INTERVENTIONS:  -  Assess patient's ability to carry out ADLs; assess patient's baseline for ADL function and identify physical deficits which impact ability to perform ADLs (bathing, care of mouth/teeth, toileting, grooming, dressing, etc )  - Assess/evaluate cause of self-care deficits   - Assess range of motion  - Assess patient's mobility; develop plan if impaired  - Assess patient's need for assistive devices and provide as appropriate  - Encourage maximum independence but intervene and supervise when necessary  - Involve family in performance of ADLs  - Assess for home care needs following discharge   - Consider OT consult to assist with ADL evaluation and planning for discharge  - Provide patient education as appropriate  9/22/2022 1309 by Sriram Cisneros RN  Outcome: Adequate for Discharge  9/22/2022 1139 by Sriram Cisneros RN  Outcome: Progressing  Goal: Maintains/Returns to pre admission functional level  Description: INTERVENTIONS:  - Perform BMAT or MOVE assessment daily    - Set and communicate daily mobility goal to care team and patient/family/caregiver  - Collaborate with rehabilitation services on mobility goals if consulted  - Perform Range of Motion  times a day  - Reposition patient every  hours    - Dangle patient  times a day  - Stand patient  times a day  - Ambulate patient  times a day  - Out of bed to chair  times a day   - Out of bed for meals  times a day  - Out of bed for toileting  - Record patient progress and toleration of activity level   9/22/2022 1309 by Denise Beasley RN  Outcome: Adequate for Discharge  9/22/2022 1139 by Denise Beasley RN  Outcome: Progressing     Problem: Potential for Falls  Goal: Patient will remain free of falls  Description: INTERVENTIONS:  - Educate patient/family on patient safety including physical limitations  - Instruct patient to call for assistance with activity   - Consult OT/PT to assist with strengthening/mobility   - Keep Call bell within reach  - Keep bed low and locked with side rails adjusted as appropriate  - Keep care items and personal belongings within reach  - Initiate and maintain comfort rounds  - Make Fall Risk Sign visible to staff  - Offer Toileting every  Hours, in advance of need  - Initiate/Maintain alarm  - Obtain necessary fall risk management equipment  - Apply yellow socks and bracelet for high fall risk patients  - Consider moving patient to room near nurses station  9/22/2022 1309 by Denise Beasley RN  Outcome: Adequate for Discharge  9/22/2022 1139 by Denise Beasley RN  Outcome: Progressing

## 2022-09-22 NOTE — UTILIZATION REVIEW
Continued Stay Review    Date: 7/22/2022                          Current Patient Class:  Inpatient   Current Level of Care:  Med surg     HPI:72 y o  male initially admitted on 9/20 to observation changed to inpatient on 9/21  He presented s/p multiple falls, Hx of CKD stage 4, Anemia, thrombocytopenia, Hx of liver transplant,  COVID-19 dx 9/12  Now asymptomatic and a prior CVA  He requires evaluation for orthostatic hypotension, PT/OT evaluation for a  safe discharge plan  Assessment/Plan: 9/22/2022 -  No acute events overnight  Ankle pain improving  R foot xray negative for acute abnormalities  Check Uric acid and venous duplex given mild edema  Glucose to 52 this am, he is asymptomatic  Hold levemir 15 unit HS for now  Monitor orthostatics  PT/OT recommending rehab, pt prefers [facility]  Vital Signs:   Date/Time Temp Pulse Resp BP MAP (mmHg) SpO2 O2 Device Patient Position - Orthostatic VS   09/22/22 07:33:06 97 3 °F (36 3 °C) Abnormal  63 18 111/64 80 99 % -- --   09/21/22 22:37:46 -- 81 14 136/76 96 97 % -- --   09/21/22 15:29:35 97 9 °F (36 6 °C) 76 18 115/70 85 99 % -- --   09/21/22 11:19:19 97 9 °F (36 6 °C) 77 17 113/70 84 99 % -- --   09/21/22 09:04:41 -- 104 18 80/34 Abnormal  49 Abnormal  97 % -- --         Pertinent Labs/Diagnostic Results:   XR R foot - 9/21 - No acute osseous abnormality      Results from last 7 days   Lab Units 09/21/22  0521 09/20/22  1210   WBC Thousand/uL 5 63 5 93   HEMOGLOBIN g/dL 8 2* 8 6*   HEMATOCRIT % 27 4* 27 7*   PLATELETS Thousands/uL 142* 142*   NEUTROS ABS Thousands/µL  --  3 60         Results from last 7 days   Lab Units 09/21/22  0521 09/20/22  2241 09/20/22  1210   SODIUM mmol/L 136 137 138   POTASSIUM mmol/L 4 6 4 6 4 3   CHLORIDE mmol/L 105 106 108   CO2 mmol/L 26 27 26   ANION GAP mmol/L 5 4 4   BUN mg/dL 30* 31* 33*   CREATININE mg/dL 2 20* 2 24* 2 28*   EGFR ml/min/1 73sq m 28 27 27   CALCIUM mg/dL 8 3 8 2* 8 5   MAGNESIUM mg/dL  --  1 6  -- Results from last 7 days   Lab Units 09/21/22  0521 09/20/22  1210   AST U/L 18  --    ALT U/L 18  --    ALK PHOS U/L 71  --    TOTAL PROTEIN g/dL 7 2  --    ALBUMIN g/dL 2 6*  --    TOTAL BILIRUBIN mg/dL 0 30  --    AMMONIA umol/L  --  23     Results from last 7 days   Lab Units 09/22/22  0804 09/21/22  2109 09/21/22  1619 09/21/22  1559 09/21/22  1119 09/21/22  0745 09/20/22  2059 09/20/22  1710   POC GLUCOSE mg/dl 52* 158* 146* 148* 77 84 171* 103     Results from last 7 days   Lab Units 09/21/22  0521 09/20/22  2241 09/20/22  1210   GLUCOSE RANDOM mg/dL 103 170* 87       Results from last 7 days   Lab Units 09/20/22  1612 09/20/22  1452 09/20/22  1210   HS TNI 0HR ng/L  --   --  8   HS TNI 2HR ng/L  --  7  --    HSTNI D2 ng/L  --  -1  --    HS TNI 4HR ng/L 9  --   --    HSTNI D4 ng/L 1  --   --          Results from last 7 days   Lab Units 09/20/22  1210   PROTIME seconds 14 2   INR  1 08   PTT seconds 29     Results from last 7 days   Lab Units 09/20/22  1454   CLARITY UA  Clear   COLOR UA  Colorless   SPEC GRAV UA  1 011   PH UA  7 0   GLUCOSE UA mg/dl Negative   KETONES UA mg/dl Negative   BLOOD UA  Negative   PROTEIN UA mg/dl Trace*   NITRITE UA  Negative   BILIRUBIN UA  Negative   UROBILINOGEN UA (BE) mg/dl <2 0   LEUKOCYTES UA  Trace*   WBC UA /hpf 2-4*   RBC UA /hpf 1-2   BACTERIA UA /hpf None Seen   EPITHELIAL CELLS WET PREP /hpf None Seen         Medications:   Scheduled Medications:  acetaminophen, 975 mg, Oral, Q8H CROW  clopidogrel, 75 mg, Oral, Daily  heparin (porcine), 5,000 Units, Subcutaneous, Q8H CROW  insulin lispro, 1-5 Units, Subcutaneous, TID AC  insulin lispro, 1-5 Units, Subcutaneous, HS  lidocaine, 1 patch, Topical, Daily  phenazopyridine, 100 mg, Oral, Once per day on Mon Wed Fri  pregabalin, 50 mg, Oral, TID  tacrolimus, 1 mg, Oral, Q12H CROW  tamsulosin, 0 4 mg, Oral, Daily With Dinner      Continuous IV Infusions:  multi-electrolyte, 100 mL/hr, Intravenous, Continuous      PRN Meds:  ondansetron, 4 mg, Intravenous, Q6H PRN - 9/21 x 1   oxyCODONE, 2 5 mg, Oral, Q4H PRN - 9/21 x 1   oxyCODONE, 5 mg, Oral, Q4H PRN - 9/21 x 1   senna, 1 tablet, Oral, HS PRN        Discharge Plan: TBD     Network Utilization Review Department  ATTENTION: Please call with any questions or concerns to [phone] and carefully listen to the prompts so that you are directed to the right person  All voicemails are confidential   [facility] all requests for admission clinical reviews, approved or denied determinations and any other requests to dedicated fax number below belonging to the campus where the patient is receiving treatment   List of dedicated fax numbers for the Facilities:  [address] DENIALS (Administrative/Medical Necessity) [phone]   [address] (Maternity/NICU/Pediatrics) [phone]   [address]  [address] [address] [place] [address] [address] [place] [address] [phone]

## 2022-09-22 NOTE — ASSESSMENT & PLAN NOTE
F/u xrays done on admission were negative for acute findings, slightly improving as of 9/22  · Continue scheduled tylenol  · PRN oxy as per geriatric pain management set  · Lido patch   · Check uric acid and venous duplex given mild edema    Leaving AMA prior to results

## 2022-09-22 NOTE — ASSESSMENT & PLAN NOTE
No results found for: HGBA1C    Recent Labs     09/21/22  1559 09/21/22  1619 09/21/22  2109 09/22/22  0804   POCGLU 148* 146* 158* 52*       Blood Sugar Average: Last 72 hrs:  (P) 117 375  · With severe hypoglycemia AM 9/22 however asymptomatic   · HOLD levemir 15 units HS for now  · SSI/accuchecks

## 2022-09-22 NOTE — ASSESSMENT & PLAN NOTE
No results found for: HGBA1C    Recent Labs     09/21/22  1619 09/21/22  2109 09/22/22  0804 09/22/22  1109   POCGLU 146* 158* 52* 105       Blood Sugar Average: Last 72 hrs:  (P) 116  · With severe hypoglycemia AM 9/22 however asymptomatic   · HOLD levemir 15 units HS for now  · SSI/accuchecks

## 2022-09-22 NOTE — ASSESSMENT & PLAN NOTE
Likely in setting of poor PO intake last few days (recent COVID infection)  · Orthostatics + with PT on 9/21 AM  · IVF, compression stockings added  · Ambien/restorial held  · Continue flomax but could be contributing  · Repeat on 9/23 prior to anticipated discharge

## 2022-09-22 NOTE — ASSESSMENT & PLAN NOTE
Presented with 3 falls in 1 night  Denies LOC  · Evaluated by trauma team in ER  · CT scan of the head, CT C spine neg for acute injuries  · CXR, R tib/fib, R ankle xray and R foot xray negative for acute abnormalities   · Falls occurred when getting OOB and according to the patient's daughter, patient has not been adequately drinking fluids  · Patient also on Flomax at bedtime, as well as temazepam and Ambien--likely 2/2 orthostatic hypotension  · Orthostatic BPs + AM of 9/21 with PT--continue IVF  · Added compression stockings  · Hold Ambien and temazepam   · Will order REPEAT orthostatics for AM of 9/23 prior to anticipated discharge  · Telemetry without events, discontinued on 9/21   Troponins negative, EKG NSR  · PT/OT recommending rehab however patient prefers [facility]

## 2022-09-22 NOTE — ASSESSMENT & PLAN NOTE
Diagnosed, 9/12  · Presently asymptomatic and never required oxygen  · Did get Bebtelovimab infusion outpatient   · No need for further treatment/isolation   · Likely contributed to primary problem as above

## 2022-09-22 NOTE — ASSESSMENT & PLAN NOTE
Presented with 3 falls in 1 night  Denies LOC  · Evaluated by trauma team in ER  · CT scan of the head, CT C spine neg for acute injuries  · CXR, R tib/fib, R ankle xray and R foot xray negative for acute abnormalities   · Falls occurred when getting OOB and according to the patient's daughter, patient has not been adequately drinking fluids  · Patient also on Flomax at bedtime, as well as temazepam and Ambien--likely 2/2 orthostatic hypotension  · Orthostatic BPs + AM of 9/21 with PT--continue IVF  · Added compression stockings  · Hold Ambien and temazepam   · Will order REPEAT orthostatics for AM of 9/23 prior to anticipated discharge  · Telemetry without events, discontinued on 9/21  Troponins negative, EKG NSR  · PT/OT recommending rehab however patient prefers [facility]    Ultimately decided to leave AMA   Risks discussed, signed off

## 2022-09-22 NOTE — PROGRESS NOTES
[address] [facility]  Progress Note - Grant Card 1950, 74 y o  male MRN: [id number]  [facility]: [id number]  Primary Care Provider: Suzan Willams,    Date and time admitted to hospital: 9/20/2022 10:46 AM      Dehydration: POA, in setting of mulitple falls, a/e/b poor oral intake, orthostatic hypotension  In setting of recent COVID-19  Treated with IVF, ortho BP checks, PT/OT and compression stockings    * Multiple falls  Assessment & Plan  Presented with 3 falls in 1 night  Denies LOC  · Evaluated by trauma team in ER  · CT scan of the head, CT C spine neg for acute injuries  · CXR, R tib/fib, R ankle xray and R foot xray negative for acute abnormalities   · Falls occurred when getting OOB and according to the patient's daughter, patient has not been adequately drinking fluids  · Patient also on Flomax at bedtime, as well as temazepam and Ambien--likely 2/2 orthostatic hypotension  · Orthostatic BPs + AM of 9/21 with PT--continue IVF  · Added compression stockings  · Hold Ambien and temazepam   · Will order REPEAT orthostatics for AM of 9/23 prior to anticipated discharge  · Telemetry without events, discontinued on 9/21   Troponins negative, EKG NSR  · PT/OT recommending rehab however patient prefers [facility]    Hydronephrosis  Assessment & Plan  Hx of Right-sided hydroureteronephrosis, status post right-sided nephrostomy tube in May 2022  · S/p present CT a/p no r hydronephrosis      Orthostatic hypotension  Assessment & Plan  Likely in setting of poor PO intake last few days (recent COVID infection)  · Orthostatics + with PT on 9/21 AM  · IVF, compression stockings added  · Ambien/restorial held  · Continue flomax but could be contributing  · Repeat on 9/23 prior to anticipated discharge     Right ankle pain  Assessment & Plan  F/u xrays done on admission were negative for acute findings, slightly improving as of 9/22  · Continue scheduled tylenol  · PRN oxy as per geriatric pain management set  · Lido patch   · Check uric acid and venous duplex given mild edema    History of COVID-19  Assessment & Plan  Diagnosed, 9/12  · Presently asymptomatic and never required oxygen  · Did get Bebtelovimab infusion outpatient   · No need for further treatment/isolation   · Likely contributed to primary problem as above    History of CVA (cerebrovascular accident)  Assessment & Plan  Continue Plavix  · Not on atorvastatin due to allergy  Controlled type 2 diabetes mellitus with diabetic neuropathy, with long-term current use of insulin [facility])  Assessment & Plan  No results found for: HGBA1C    Recent Labs     09/21/22  1559 09/21/22  1619 09/21/22  2109 09/22/22  0804   POCGLU 148* 146* 158* 52*       Blood Sugar Average: Last 72 hrs:  (P) 117 375  · With severe hypoglycemia AM 9/22 however asymptomatic   · HOLD levemir 15 units HS for now  · SSI/accuchecks    History of liver transplant [facility])  Assessment & Plan  Continue tacrolimus  · Monitor CMP    History of bladder cancer  Assessment & Plan  Follows with oncologist and palliative care specialist   · Had chemotherapy and radiation treatment  · History of right hydroureteronephrosis, with right nephrostomy tube  Thrombocytopenia (HCC)  Assessment & Plan  Mild, chronic and stable   · No bleeding     Anemia due to chronic kidney disease  Assessment & Plan  Per chart review chronic and stable  · Monitor  · No active bleeding      Stage 4 chronic kidney disease [facility])  Assessment & Plan  Lab Results   Component Value Date    EGFR 28 09/21/2022    EGFR 27 09/20/2022    EGFR 27 09/20/2022    CREATININE 2 20 (H) 09/21/2022    CREATININE 2 24 (H) 09/20/2022    CREATININE 2 28 (H) 09/20/2022     · Likely baseline creatinine is around 2 4 to 2 7 from chart review   · Continue IVF  · Monitor BMP        VTE Pharmacologic Prophylaxis: VTE Score: 3 Moderate Risk (Score 3-4) - Pharmacological DVT Prophylaxis Ordered: heparin  Patient Centered Rounds: I performed bedside rounds with nursing staff today  Discussions with Specialists or Other Care Team Provider: d/w CM    Education and Discussions with Family / Patient: Attempted to update  (wife) via phone  Left voicemail  Time Spent for Care: 30 minutes  More than 50% of total time spent on counseling and coordination of care as described above  Current Length of Stay: 1 day(s)  Current Patient Status: Inpatient   Certification Statement: The patient will continue to require additional inpatient hospital stay due to repeat orthostatics, monitoring of ankle pain (u/s, uric acid) and stability of glucose  Discharge Plan: Anticipate discharge tomorrow to home with home services  (however rehab recommended)    Code Status: Level 3 - DNAR and DNI    Subjective:   Doing better today  Ankle pain improving but still present  Denies dizziness or lightheadedness  Objective:     Vitals:   Temp (24hrs), Av 7 °F (36 5 °C), Min:97 3 °F (36 3 °C), Max:97 9 °F (36 6 °C)    Temp:  [97 3 °F (36 3 °C)-97 9 °F (36 6 °C)] 97 3 °F (36 3 °C)  HR:  [63-81] 63  Resp:  [14-18] 18  BP: (111-136)/(64-76) 111/64  SpO2:  [97 %-99 %] 99 %  There is no height or weight on file to calculate BMI  Input and Output Summary (last 24 hours): Intake/Output Summary (Last 24 hours) at 2022 1028  Last data filed at 2022 0318  Gross per 24 hour   Intake 200 ml   Output 740 ml   Net -540 ml       Physical Exam:   Physical Exam  Vitals and nursing note reviewed  Constitutional:       General: He is not in acute distress  Cardiovascular:      Rate and Rhythm: Normal rate and regular rhythm  Pulmonary:      Effort: No respiratory distress  Abdominal:      General: There is no distension  Musculoskeletal:         General: Swelling (very mild R ankle) present  No tenderness  Right lower leg: No edema  Left lower leg: No edema     Neurological:      Mental Status: He is oriented to person, place, and time  Psychiatric:         Mood and Affect: Mood normal           Additional Data:     Labs:  Results from last 7 days   Lab Units 09/21/22  0521 09/20/22  1210   WBC Thousand/uL 5 63 5 93   HEMOGLOBIN g/dL 8 2* 8 6*   HEMATOCRIT % 27 4* 27 7*   PLATELETS Thousands/uL 142* 142*   NEUTROS PCT %  --  61   LYMPHS PCT %  --  18   MONOS PCT %  --  13*   EOS PCT %  --  7*     Results from last 7 days   Lab Units 09/21/22  0521   SODIUM mmol/L 136   POTASSIUM mmol/L 4 6   CHLORIDE mmol/L 105   CO2 mmol/L 26   BUN mg/dL 30*   CREATININE mg/dL 2 20*   ANION GAP mmol/L 5   CALCIUM mg/dL 8 3   ALBUMIN g/dL 2 6*   TOTAL BILIRUBIN mg/dL 0 30   ALK PHOS U/L 71   ALT U/L 18   AST U/L 18   GLUCOSE RANDOM mg/dL 103     Results from last 7 days   Lab Units 09/20/22  1210   INR  1 08     Results from last 7 days   Lab Units 09/22/22  0804 09/21/22  2109 09/21/22  1619 09/21/22  1559 09/21/22  1119 09/21/22  0745 09/20/22  2059 09/20/22  1710   POC GLUCOSE mg/dl 52* 158* 146* 148* 77 84 171* 103               Lines/Drains:  Invasive Devices  Report    Central Venous Catheter Line  Duration           Port A Cath Subclavian -- days          Drain  Duration           Nephrostomy Right <1 day                Central Line:  Goal for removal: chronic port              Imaging: No pertinent imaging reviewed      Recent Cultures (last 7 days):         Last 24 Hours Medication List:   Current Facility-Administered Medications   Medication Dose Route Frequency Provider Last Rate    acetaminophen  975 mg Oral Q8H [facility] Lay Buckner PA-C      clopidogrel  75 mg Oral Daily Jacob Olson MD      heparin (porcine)  5,000 Units Subcutaneous Q8H Deb Olson MD      insulin lispro  1-5 Units Subcutaneous TID AC Oneil Villarreal MD      insulin lispro  1-5 Units Subcutaneous HS Jacob Olson MD      lidocaine  1 patch Topical Daily Nikko Gates ELIZABETH      multi-electrolyte  100 mL/hr Intravenous Continuous Oneil Barrientos  mL/hr (09/21/22 8877)    ondansetron  4 mg Intravenous Q6H PRN Oneil Barrientos MD      oxyCODONE  2 5 mg Oral Q4H PRN Oneil Barrientos MD      oxyCODONE  5 mg Oral Q4H PRN Yvette Lemons PA-C      phenazopyridine  100 mg Oral Once per day on Mon Wed Fri Audra Desai 20, MD      pregabalin  50 mg Oral TID Anna Olson MD      senna  1 tablet Oral HS PRN Anna Olson MD      tacrolimus  1 mg Oral Q12H Dalmatincarlos Olson MD      tamsulosin  0 4 mg Oral Daily With See Galloway MD          Today, Patient Was Seen By: Yvette Lemons PA-C    **Please Note: This note may have been constructed using a voice recognition system  **

## 2022-09-22 NOTE — PLAN OF CARE
Problem: Prexisting or High Potential for Compromised Skin Integrity  Goal: Skin integrity is maintained or improved  Description: INTERVENTIONS:  - Identify patients at risk for skin breakdown  - Assess and monitor skin integrity  - Assess and monitor nutrition and hydration status  - Monitor labs   - Assess for incontinence   - Turn and reposition patient  - Assist with mobility/ambulation  - Relieve pressure over bony prominences  - Avoid friction and shearing  - Provide appropriate hygiene as needed including keeping skin clean and dry  - Evaluate need for skin moisturizer/barrier cream  - Collaborate with interdisciplinary team   - Patient/family teaching  - Consider wound care consult   Outcome: Progressing     Problem: MOBILITY - ADULT  Goal: Maintain or return to baseline ADL function  Description: INTERVENTIONS:  -  Assess patient's ability to carry out ADLs; assess patient's baseline for ADL function and identify physical deficits which impact ability to perform ADLs (bathing, care of mouth/teeth, toileting, grooming, dressing, etc )  - Assess/evaluate cause of self-care deficits   - Assess range of motion  - Assess patient's mobility; develop plan if impaired  - Assess patient's need for assistive devices and provide as appropriate  - Encourage maximum independence but intervene and supervise when necessary  - Involve family in performance of ADLs  - Assess for home care needs following discharge   - Consider OT consult to assist with ADL evaluation and planning for discharge  - Provide patient education as appropriate  Outcome: Progressing  Goal: Maintains/Returns to pre admission functional level  Description: INTERVENTIONS:  - Perform BMAT or MOVE assessment daily    - Set and communicate daily mobility goal to care team and patient/family/caregiver  - Collaborate with rehabilitation services on mobility goals if consulted  - Perform Range of Motion  times a day    - Reposition patient every hours   - Dangle patient  times a day  - Stand patient  times a day  - Ambulate patient  times a day  - Out of bed to chair times a day   - Out of bed for meals  times a day  - Out of bed for toileting  - Record patient progress and toleration of activity level   Outcome: Progressing     Problem: Potential for Falls  Goal: Patient will remain free of falls  Description: INTERVENTIONS:  - Educate patient/family on patient safety including physical limitations  - Instruct patient to call for assistance with activity   - Consult OT/PT to assist with strengthening/mobility   - Keep Call bell within reach  - Keep bed low and locked with side rails adjusted as appropriate  - Keep care items and personal belongings within reach  - Initiate and maintain comfort rounds  - Make Fall Risk Sign visible to staff  - Offer Toileting every  Hours, in advance of need  - Initiate/Maintain alarm  - Obtain necessary fall risk management equipment  - Apply yellow socks and bracelet for high fall risk patients  - Consider moving patient to room near nurses station  Outcome: Progressing

## 2022-09-22 NOTE — WOUND OSTOMY CARE
Consult Note - Wound   Marj Linea 74 y o  male MRN: [id number]  Unit/Bed#: [facility] 903-01 Encounter: [id number]      History and Present Illness:  74year old male presented to the hospital status post falls at home  Assessment Findings:   Patient agreeable to assessment, able to turn in bed independently  Continent of bowel and bladder  Buttocks and sacrum intact with hyperpigmentation  Bilateral heels intact  2   Right arm skin tear--beefy red, partial thickness, scant serosanguinous drainage  Jeanie-wound ecchymotic and fragile  See flowsheet for wound details  Wound Care Plan:   1-Hydraguard lotion to bilateral buttocks, sacrum, and heels twice daily and as needed  2-Elevate heels off of bed/chair surface to offload pressure  3-Offloading air cushion in chair when out of bed  4-Moisturize skin daily with skin nourishing cream   5-Encourage/remind patient to turn/reposition every 2 hours while in bed and weight shift frequently while in chair for pressure re-distribution on skin  6-Right arm tears--cleanse with normal saline, pat dry  Apply Dermagran to wound and cover with ABD, wrap with macie  Change dressing every other day  Wound care team to sign-off at this time        Wound 09/21/22 Abrasion(s) Arm Posterior;Right (Active)   Wound Image   09/22/22 0937   Wound Description Beefy red 09/22/22 0937   Jeanie-wound Assessment Purple 09/22/22 0937   Wound Length (cm) 12 5 cm 09/22/22 0937   Wound Width (cm) 0 5 cm 09/22/22 0937   Wound Depth (cm) 0 1 cm 09/22/22 0937   Wound Surface Area (cm^2) 6 25 cm^2 09/22/22 0937   Wound Volume (cm^3) 0 625 cm^3 09/22/22 0937   Calculated Wound Volume (cm^3) 0 63 cm^3 09/22/22 0937   Drainage Amount Scant 09/22/22 0937   Drainage Description Serosanguineous 09/22/22 0937   Non-staged Wound Description Partial thickness 09/22/22 0937   Treatments Cleansed 09/22/22 0937   Dressing Dermagran gauze;Dry dressing 09/22/22 0937   Dressing Changed Changed 09/22/22 0937   Patient Tolerance Tolerated well 09/22/22 0937   Dressing Status Clean;Dry; Intact 09/22/22 96[address] [facility] BSN, RN, Tamica Issa

## 2022-09-23 ENCOUNTER — HOME CARE VISIT (OUTPATIENT)
Dept: HOME HEALTH SERVICES | Facility: HOME HEALTHCARE | Age: 72
End: 2022-09-23

## 2022-09-23 ENCOUNTER — TRANSITIONAL CARE MANAGEMENT (OUTPATIENT)
Dept: FAMILY MEDICINE CLINIC | Facility: CLINIC | Age: 72
End: 2022-09-23

## 2022-09-23 PROCEDURE — 93971 EXTREMITY STUDY: CPT | Performed by: SURGERY

## 2022-09-23 NOTE — UTILIZATION REVIEW
Notification of Discharge   This is a Notification of Discharge from our facility [address]  Please be advised that this patient has been discharge from our facility  Below you will find the admission and discharge date and time including the patients disposition  UTILIZATION REVIEW CONTACT:  Shanique Aldridge  Utilization   Network Utilization Review Department  Phone: [phone] x carefully listen to the prompts  All voicemails are confidential   Email: [email or web address]     PHYSICIAN ADVISORY SERVICES:  FOR [id number] REVIEW - MEDICAL NECESSITY DENIAL  Phone: [phone]  Fax: [phone]  Email: [email or web address]     PRESENTATION DATE: 9/20/2022 10:46 AM  OBERVATION ADMISSION DATE:   INPATIENT ADMISSION DATE: 9/21/22  9:39 AM   DISCHARGE DATE: 9/22/2022  1:56 PM  DISPOSITION: Left against medical advice or discontinued care Left against medical advice or discontinued care      IMPORTANT INFORMATION:  Send all requests for admission clinical reviews, approved or denied determinations and any other requests to dedicated fax number below belonging to the campus where the patient is receiving treatment   List of dedicated fax numbers:  [address] [place] [address] DENIALS (Administrative/Medical Necessity) [phone]   [address]  (Maternity/NICU/Pediatrics) [phone]   Aury Handing [phone]   [facility] [phone]   Vicki Bauersom [phone]   [address] [place] [address],[address] [phone]   [facility]  [phone]   [address] [place] [address], [facility]  24[address] [facility] [address] [facility] [phone]

## 2022-09-26 ENCOUNTER — TELEPHONE (OUTPATIENT)
Dept: FAMILY MEDICINE CLINIC | Facility: CLINIC | Age: 72
End: 2022-09-26

## 2022-09-26 DIAGNOSIS — Z79.4 CONTROLLED TYPE 2 DIABETES MELLITUS WITH DIABETIC NEUROPATHY, WITH LONG-TERM CURRENT USE OF INSULIN (HCC): ICD-10-CM

## 2022-09-26 DIAGNOSIS — E11.40 CONTROLLED TYPE 2 DIABETES MELLITUS WITH DIABETIC NEUROPATHY, WITH LONG-TERM CURRENT USE OF INSULIN (HCC): ICD-10-CM

## 2022-09-26 RX ORDER — INSULIN DETEMIR 100 [IU]/ML
15 INJECTION, SOLUTION SUBCUTANEOUS
Qty: 15 ML | Refills: 3 | Status: SHIPPED | OUTPATIENT
Start: 2022-09-26

## 2022-09-26 NOTE — TELEPHONE ENCOUNTER
Medication: insulin detemir (Levemir FlexTouch) 100 Units/mL injection pen  How Often:Inject 15 Units under the skin daily at bedtime  Quantity:  15 units  Last Office Visit:  08/11/2022  Next Office Visit: 09/29/2022  Last refilled: 09/05/2022  How many pills left: 0  Pharmacy:   [address] [place], [place] [id number]  Phone: [phone] Fax: [phone]

## 2022-09-26 NOTE — TELEPHONE ENCOUNTER
Medication:zolpidem (AMBIEN) 10 mg tablet [[id number]]   Dosage:  How Often:TAKE 1 TABLET (10 MG TOTAL) BY MOUTH DAILY AT BEDTIME  Quantity:  30  Last Office Visit: 9/14/22  Next Office Visit: 9/29/22  Last refilled: written 8/5/22  How many pills left: 0  Pharmacy:   [address] [place] [id number]  Phone: [phone] Fax: [phone]

## 2022-09-27 DIAGNOSIS — F51.02 ADJUSTMENT INSOMNIA: ICD-10-CM

## 2022-09-27 DIAGNOSIS — Z94.4 LIVER TRANSPLANTED (HCC): ICD-10-CM

## 2022-09-27 RX ORDER — ZOLPIDEM TARTRATE 10 MG/1
10 TABLET ORAL
Qty: 30 TABLET | Refills: 3 | Status: SHIPPED | OUTPATIENT
Start: 2022-09-27 | End: 2022-10-17

## 2022-09-27 RX ORDER — TACROLIMUS 1 MG/1
1 CAPSULE ORAL EVERY 12 HOURS
Qty: 180 CAPSULE | Refills: 3 | Status: SHIPPED | OUTPATIENT
Start: 2022-09-27

## 2022-09-29 ENCOUNTER — OFFICE VISIT (OUTPATIENT)
Dept: FAMILY MEDICINE CLINIC | Facility: CLINIC | Age: 72
End: 2022-09-29
Payer: COMMERCIAL

## 2022-09-29 ENCOUNTER — TRANSITIONAL CARE MANAGEMENT (OUTPATIENT)
Dept: FAMILY MEDICINE CLINIC | Facility: CLINIC | Age: 72
End: 2022-09-29

## 2022-09-29 VITALS
HEART RATE: 70 BPM | RESPIRATION RATE: 16 BRPM | TEMPERATURE: 97.4 F | DIASTOLIC BLOOD PRESSURE: 70 MMHG | HEIGHT: 63 IN | WEIGHT: 124.2 LBS | BODY MASS INDEX: 22.01 KG/M2 | SYSTOLIC BLOOD PRESSURE: 130 MMHG | OXYGEN SATURATION: 100 %

## 2022-09-29 DIAGNOSIS — I95.1 ORTHOSTATIC HYPOTENSION: ICD-10-CM

## 2022-09-29 DIAGNOSIS — N18.32 TYPE 2 DIABETES MELLITUS WITH STAGE 3B CHRONIC KIDNEY DISEASE, WITH LONG-TERM CURRENT USE OF INSULIN (HCC): ICD-10-CM

## 2022-09-29 DIAGNOSIS — E11.22 TYPE 2 DIABETES MELLITUS WITH STAGE 3B CHRONIC KIDNEY DISEASE, WITH LONG-TERM CURRENT USE OF INSULIN (HCC): ICD-10-CM

## 2022-09-29 DIAGNOSIS — Z09 HOSPITAL DISCHARGE FOLLOW-UP: Primary | ICD-10-CM

## 2022-09-29 DIAGNOSIS — Z23 NEED FOR IMMUNIZATION AGAINST INFLUENZA: ICD-10-CM

## 2022-09-29 DIAGNOSIS — Z79.4 TYPE 2 DIABETES MELLITUS WITH STAGE 3B CHRONIC KIDNEY DISEASE, WITH LONG-TERM CURRENT USE OF INSULIN (HCC): ICD-10-CM

## 2022-09-29 PROBLEM — Z86.16 HISTORY OF COVID-19: Status: ACTIVE | Noted: 2022-09-12

## 2022-09-29 PROCEDURE — G0008 ADMIN INFLUENZA VIRUS VAC: HCPCS

## 2022-09-29 PROCEDURE — 99214 OFFICE O/P EST MOD 30 MIN: CPT | Performed by: FAMILY MEDICINE

## 2022-09-29 PROCEDURE — 90662 IIV NO PRSV INCREASED AG IM: CPT

## 2022-09-29 PROCEDURE — 1160F RVW MEDS BY RX/DR IN RCRD: CPT | Performed by: FAMILY MEDICINE

## 2022-09-29 NOTE — ASSESSMENT & PLAN NOTE
Labs reviewed from hospital stay and was stable on discharge  Lab Results   Component Value Date    HGBA1C 6 8 (H) 08/19/2022

## 2022-09-29 NOTE — ASSESSMENT & PLAN NOTE
Likely cause of call  Came in on trama alert   All scans reviewed and all normal  Chart needs to be merged  Did leave AMA

## 2022-09-29 NOTE — PROGRESS NOTES
Assessment/Plan:    1  Hospital discharge follow-up    2  Orthostatic hypotension  Assessment & Plan:  Likely cause of call  Came in on trama alert   All scans reviewed and all normal  Chart needs to be merged  Did leave AMA        3  Need for immunization against influenza  -     influenza vaccine, high-dose, PF 0 7 mL (FLUZONE HIGH-DOSE)    4  Type 2 diabetes mellitus with stage 3b chronic kidney disease, with long-term current use of insulin [facility])  Assessment & Plan:  Labs reviewed from hospital stay and was stable on discharge  Lab Results   Component Value Date    HGBA1C 6 8 (H) 08/19/2022                 There are no Patient Instructions on file for this visit  No follow-ups on file  Subjective:      Patient ID: Margarette Palmer is a 74 y o  male  Chief Complaint   Patient presents with    Transition of Care Management     Patient here for TCM Discharge on 09/22/2022 [facility] Multiple falls        Here with wife  Had been recovering from covid   Had not been out of bed  Decreased appetite and po intake  Fell 3 times and hit head  Now feeling back to normal    TCM Call     Date and time call was made  9/23/2022  5:39 PM    Hospital care reviewed  Records not available    Patient was hospitialized at  [facility]    Date of Admission  09/20/22    Date of discharge  09/22/22    Diagnosis  Multiple Falls    Disposition  Home; Home health services    Were the patients medications reviewed and updated  No    Current Symptoms  None    Pain with urination severity  Mild    Pain with urination  Gradual      TCM Call     Post hospital issues  None    Should patient be enrolled in anticoag monitoring? Yes    Scheduled for follow up?   Yes    Did you obtain your prescribed medications  Yes    Do you need help managing your prescriptions or medications  No    Is transportation to your appointment needed  No    I have advised the patient to call PCP with any new or worsening symptoms  Corey Millan MARÍA ELENA Kitchen    Living Arrangements  Spouse or Significiant other    Support System  None    Are you recieving any outpatient services  No    Are you recieving home care services  Yes    Types of home care services  Home health aid    Are you using any community resources  No    Current waiver services  No    Have you fallen in the last 12 months  Yes    How many times  5    Interperter language line needed  Yes    Counseling  Patient        The following portions of the patient's history were reviewed and updated as appropriate: allergies, current medications, past family history, past medical history, past social history, past surgical history and problem list     Review of Systems   Constitutional: Negative  HENT: Negative  Eyes: Negative  Respiratory: Negative  Cardiovascular: Negative  Gastrointestinal: Negative  Endocrine: Negative  Genitourinary: Negative  Musculoskeletal: Negative  Skin: Negative  Allergic/Immunologic: Negative  Neurological: Negative  Hematological: Negative  Psychiatric/Behavioral: Negative            Current Outpatient Medications   Medication Sig Dispense Refill    Alcohol Swabs (B-D SINGLE USE SWABS REGULAR) PADS USE 2-3 TIMES DAILY AS NEEDED      Blood Glucose Calibration (OT ULTRA/FASTTK CNTRL SOLN) SOLN USE AS DIRECTED 1 each 0    clopidogrel (PLAVIX) 75 mg tablet Take 1 tablet (75 mg total) by mouth daily 90 tablet 3    Comfort EZ Pen Needles 32G X 4 MM MISC USE 3-4 AS DIRECTED 100 each 5    Continuous Blood Gluc  (FreeStyle Frank 14 Day [place]) NATALIA Use 1 Device continuous 1 each 0    Continuous Blood Gluc Sensor (FreeStyle Frank 14 Day Sensor) MISC Use 1 Device 4 (four) times a day 1 each 5    Incontinence Supply Disposable (Incontinence Brief Medium) MISC Use 4 (four) times a day 120 each 0    insulin detemir (Levemir FlexTouch) 100 Units/mL injection pen Inject 15 Units under the skin daily at bedtime 15 mL 3    INSULIN SYRINGE 1CC/29G 29G X 1/2" 1 ML MISC by Does not apply route      Lancet Devices (Lancing Device) MISC USE AS DIRECTED 1 each 0    Lancets MISC by Does not apply route      loperamide (IMODIUM) 2 mg capsule Take 1 capsule (2 mg total) by mouth 2 (two) times a day as needed for diarrhea [place] carmen tableta dos veces por cynthia si tiene diarrea 90 capsule 0    Nutritional Supplements (Glucerna Shake) LIQD Take 1 Bottle by mouth 3 (three) times a day 08748 mL 0    OneTouch Ultra test strip TEST UP TO 3 TIMES A  each 0    pregabalin (LYRICA) 50 mg capsule TAKE 1 CAPSULE (50 MG TOTAL) BY MOUTH 3 (THREE) TIMES A DAY 90 capsule 5    rosuvastatin (CRESTOR) 40 MG tablet TAKE ONE (1) TABLET BY MOUTH DAILY 30 tablet 5    sodium chloride, PF, 0 9 % 10 mL by Intracatheter route daily Intracatheter flushing daily  May substitute prefilled syringe with normal saline 10 mL vials, 10 mL syringes, and 18 g blunt needles 300 mL 2    sodium chloride       tacrolimus (PROGRAF) 1 mg capsule TAKE 1 CAPSULE (1 MG TOTAL) BY MOUTH EVERY 12 (TWELVE) HOURS 180 capsule 3    tamsulosin (FLOMAX) 0 4 mg Take 1 capsule (0 4 mg total) by mouth daily with dinner 90 capsule 0    temazepam (RESTORIL) 30 mg capsule TAKE 1 CAPSULE (30 MG TOTAL) BY MOUTH DAILY AT BEDTIME 30 capsule 5    zolpidem (AMBIEN) 10 mg tablet Take 1 tablet (10 mg total) by mouth daily at bedtime 30 tablet 3     Current Facility-Administered Medications   Medication Dose Route Frequency Provider Last Rate Last Admin    lidocaine (URO-JET, GLYDO) 2 % urethral/mucosal gel 1 application  1 application Urethral Daily PRN Kerri Main MD           Objective:    /70 (BP Location: Left arm, Patient Position: Sitting, Cuff Size: Standard)   Pulse 70   Temp (!) 97 4 °F (36 3 °C) (Tympanic)   Resp 16   Ht 5' 3" (1 6 m)   Wt 56 3 kg (124 lb 3 2 oz)   SpO2 100%   BMI 22 00 kg/m²        Physical Exam  Vitals and nursing note reviewed     Constitutional: Appearance: Normal appearance  HENT:      Head: Normocephalic and atraumatic  Eyes:      Extraocular Movements: Extraocular movements intact  Pupils: Pupils are equal, round, and reactive to light  Cardiovascular:      Rate and Rhythm: Normal rate and regular rhythm  Pulses: Normal pulses  Heart sounds: Normal heart sounds  Pulmonary:      Effort: Pulmonary effort is normal       Breath sounds: Normal breath sounds  Abdominal:      General: Abdomen is flat  Palpations: Abdomen is soft  Musculoskeletal:      Cervical back: Normal range of motion and neck supple  Skin:     General: Skin is warm  Capillary Refill: Capillary refill takes less than 2 seconds  Neurological:      General: No focal deficit present  Mental Status: He is alert and oriented to person, place, and time  Psychiatric:         Mood and Affect: Mood normal          Behavior: Behavior normal          Thought Content:  Thought content normal          Judgment: Judgment normal                 Weston Vaca

## 2022-09-30 ENCOUNTER — RADIATION ONCOLOGY FOLLOW-UP (OUTPATIENT)
Dept: RADIATION ONCOLOGY | Facility: HOSPITAL | Age: 72
End: 2022-09-30
Attending: RADIOLOGY
Payer: COMMERCIAL

## 2022-09-30 VITALS
HEART RATE: 93 BPM | HEIGHT: 63 IN | DIASTOLIC BLOOD PRESSURE: 74 MMHG | BODY MASS INDEX: 22.22 KG/M2 | OXYGEN SATURATION: 98 % | WEIGHT: 125.4 LBS | TEMPERATURE: 98.3 F | SYSTOLIC BLOOD PRESSURE: 118 MMHG

## 2022-09-30 DIAGNOSIS — C67.0 MALIGNANT NEOPLASM OF TRIGONE OF URINARY BLADDER (HCC): Primary | ICD-10-CM

## 2022-09-30 DIAGNOSIS — E78.2 MIXED HYPERLIPIDEMIA: ICD-10-CM

## 2022-09-30 PROCEDURE — 99211 OFF/OP EST MAY X REQ PHY/QHP: CPT | Performed by: RADIOLOGY

## 2022-09-30 PROCEDURE — 99024 POSTOP FOLLOW-UP VISIT: CPT | Performed by: RADIOLOGY

## 2022-09-30 RX ORDER — ROSUVASTATIN CALCIUM 40 MG/1
TABLET, COATED ORAL
Qty: 30 TABLET | Refills: 5 | Status: SHIPPED | OUTPATIENT
Start: 2022-09-30

## 2022-09-30 NOTE — PROGRESS NOTES
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  Completed radiation therapy with 36 fractions to bladder and pelvis on 8/25/22  Gemcitabine infusions were discontinued 7/14/22 d/t dysuria and fatigue  Today's visit is a 1 month follow-up    9/3/22-9/5/22 Admitted [address]  DX: Urinary retention, likely secondary to acute cystitis          Hydronephrosis/CKD           Bladder tumor, s/p chemo/radiation    9/8/22  Keli, Nephrology  Right nephrostomy tube remains in place  Continued f/u with urology  Repeat visit with nephrology in 4 months    Upcoming:  10/14/22  CT scan  10/20/22  Hem/Onc  11/8/21    Urology        Follow up visit     Oncology History   Malignant neoplasm of trigone of urinary bladder ([facility] [facility] )   6/15/2022 Initial Diagnosis    Malignant neoplasm of trigone of urinary bladder ([facility] [address] )     6/28/2022 - 7/8/2022 Chemotherapy    gemcitabine (GEMZAR) infusion, 27 mg/m2 = 42 6 mg, Intravenous, Once, 1 of 2 cycles  Administration: 42 6 mg (6/28/2022), 42 6 mg (7/1/2022), 42 6 mg (7/5/2022), 42 6 mg (7/8/2022)         Review of Systems:  Review of Systems   Constitutional: Positive for fatigue  Negative for chills and unexpected weight change  HENT: Negative  Respiratory: Positive for shortness of breath (with activity only)  Cardiovascular: Negative  Gastrointestinal: Negative  Endocrine: Positive for cold intolerance  Genitourinary: Positive for enuresis (leaks urine at night)  Negative for hematuria  Nephrostomy tube in place  Draining clear yellow   Musculoskeletal: Positive for gait problem (fell x3 at home) and neck pain  Skin: Negative  Allergic/Immunologic: Negative  Hematological: Bruises/bleeds easily  Psychiatric/Behavioral: Positive for sleep disturbance  The patient is not hyperactive          Clinical Trial: no      Teaching    Covid Vaccine Status Vaccinated x3    Health Maintenance   Topic Date Due    Hepatitis A Vaccine (1 of 2 - Risk 2-dose series) Never done    Pneumococcal Vaccine: 65+ Years (3 - PPSV23 or PCV20) 09/03/2018    DM Eye Exam  07/15/2021    COVID-19 Vaccine (4 - Booster for Moderna series) 11/23/2021    Medicare Annual Wellness Visit (AWV)  11/02/2022    Depression Screening  03/09/2023    Hepatitis B Vaccine (1 of 3 - Risk 3-dose series) 03/31/2031 (Originally 4/3/1969)    URINE MICROALBUMIN  02/10/2023    HEMOGLOBIN A1C  02/19/2023    Fall Risk  03/09/2023    Diabetic Foot Exam  03/09/2023    BMI: Adult  09/29/2023    Colorectal Cancer Screening  04/27/2032    Influenza Vaccine  Completed    HIB Vaccine  Aged Out    IPV Vaccine  Aged Out    Meningococcal ACWY Vaccine  Aged Out    HPV Vaccine  Aged Out    Hepatitis C Screening  Discontinued     Patient Active Problem List   Diagnosis    History of CVA (cerebrovascular accident)    Controlled diabetes mellitus with diabetic neuropathy, with long-term current use of insulin ([facility] )    Liver transplant status ([facility] [address] )    History of immunosuppression therapy    History of hepatitis C    Thrombocytopenia (HCC)    Congenital anomaly of accessory auricle    Cerebral arterial aneurysm    Erectile dysfunction of non-organic origin    Headache, chronic daily    Insomnia    Migraine headache    Occipital neuralgia    History of cirrhosis    Peripheral neuropathy    Prurigo nodularis    Pruritus    Spondylosis of cervical region without myelopathy or radiculopathy    BPPV (benign paroxysmal positional vertigo), unspecified laterality    Vitamin D deficiency    Medicare annual wellness visit, subsequent    Screening for colon cancer    Hepatitis B core antibody positive    Rash    Type 2 diabetes mellitus with diabetic peripheral angiopathy without gangrene, with long-term current use of insulin (HCC)    Type 2 diabetes mellitus with chronic kidney disease ([address] )    Hand lesion    Subdural hygroma    Shantel's cirrhosis (alcoholic) ([facility] [address] )    Mild cognitive impairment    Gross hematuria    Hydronephrosis of right kidney    Bladder tumor    Acute blood loss anemia    Generalized weakness    Nephrostomy status (HCC)    Bladder mass    CKD (chronic kidney disease)    Malignant neoplasm of trigone of urinary bladder ([facility] [facility] )    Port-A-Cath in place    Urethritis    Weight loss    Mixed stress and urge urinary incontinence    Diarrhea    Anemia    Acute cystitis   [facility] discharge follow-up    Acute urinary retention    History of COVID-19    Orthostatic hypotension    Multiple falls    Stage 4 chronic kidney disease (HCC)    Anemia due to chronic kidney disease    Thrombocytopenia (HCC)    History of bladder cancer    History of liver transplant ([facility] [address] [facility] )    Controlled type 2 diabetes mellitus with diabetic neuropathy, with long-term current use of insulin (HCC)    History of CVA (cerebrovascular accident)    History of COVID-19    Right ankle pain    Orthostatic hypotension    Hydronephrosis    Dehydration     Past Medical History:   Diagnosis Date    Alcoholism ([facility] [address] )     Anemia     Bladder cancer ([facility] [address] )     Cerebral artery aneurysm     Change in mental state     last assessed 5/18/15; resolved 10/27/15    Diabetes mellitus ([facility] [address] )     Drug dependence [email or web address]     Fatigue     last assessed 1/26/15; resolved 5/24/16    Hepatitis [facility] discharge follow-up 12/21/2018    Hydronephrosis of right kidney     Kidney disease     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[facility] [facility] )     Liver cirrhosis ([facility] [address] [facility] )     [facility])     Liver transplant status ([email or web address]  )     Renal disorder     Stroke ([email or web address]     Subdural hygroma     2/27/14; resolved 7/28/15    Thrombocytopenia (HCC)     Thrombocytopenia ([facility] [address] ) 9/20/2017     Past Surgical History:   Procedure Laterality Date    BRAIN SURGERY  02/12/2014    left frontotemporal 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    CATARACT EXTRACTION      CHOLECYSTECTOMY      FL LUMBAR PUNCTURE DIAGNOSTIC  12/14/2018    HEPATITIS B SURFACE AB QN,LIVER TRANSPLANT (HISTORICAL)      IR NEPHROSTOMY TUBE CHECK/CHANGE/REPOSITION/REINSERTION/UPSIZE  7/29/2022    IR NEPHROSTOMY TUBE CHECK/CHANGE/REPOSITION/REINSERTION/UPSIZE  8/31/2022    IR NEPHROSTOMY TUBE PLACEMENT  5/28/2022    IR PICC LINE  12/14/2018    IR PORT PLACEMENT  6/23/2022    LIVER TRANSPLANTATION      ROTATOR CUFF REPAIR      SHOULDER SURGERY      TRANSURETHRAL RESECTION OF BLADDER TUMOR N/A 5/26/2022    Procedure: TRANSURETHRAL RESECTION OF BLADDER TUMOR (TURBT);   Surgeon: Ervin Mendes MD;  [facility]: [facility];  Service: Urology     Family History   Problem Relation Age of Onset    Hypertension Mother         benign essential     Lung cancer Father     Diabetes Sister     Cancer Brother     Stroke Other         cva - due to embolism of cerebra artery      Social History     Socioeconomic History    Marital status: /Civil Union     Spouse name: Not on file    Number of children: Not on file    Years of education: less then high school    Highest education level: Not on file   Occupational History    Occupation: physical disability    Tobacco Use    Smoking status: Never Smoker    Smokeless tobacco: Never Used    Tobacco comment: former smoker per allscripts    Vaping Use    Vaping Use: Never used   Substance and Sexual Activity    Alcohol use: Not Currently    Drug use: No     Comment: remotely quit drug use per allscripts     Sexual activity: Not Currently   Other Topics Concern    Not on file   Social History Narrative    ** Merged History Encounter **         Caffeine use   Living with significant other , girlfriend and daughter      Social Determinants of Health     Financial Resource Strain: Not on file   Food Insecurity: No Food Insecurity    Worried About [facility] in the Last Year: Never true    Bee of Food in the Last Year: Never true   Transportation Needs: No Transportation Needs    Lack of Transportation (Medical): No    Lack of Transportation (Non-Medical):  No   Physical Activity: Not on file   Stress: Not on file   Social Connections: Not on file   Intimate Partner Violence: Not on file   Housing Stability: Low Risk     Unable to Pay for Housing in the Last Year: No    Number of Places Lived in the Last Year: 1    Unstable Housing in the Last Year: No       Current Outpatient Medications:     acetaminophen (TYLENOL) 325 mg tablet, Take 3 tablets (975 mg total) by mouth every 8 (eight) hours, Disp: , Rfl: 0    Alcohol Swabs (B-D SINGLE USE SWABS REGULAR) PADS, USE 2-3 TIMES DAILY AS NEEDED, Disp: , Rfl:     Blood Glucose Calibration (OT ULTRA/FASTTK CNTRL SOLN) SOLN, USE AS DIRECTED, Disp: 1 each, Rfl: 0    clopidogrel (PLAVIX) 75 mg tablet, Take 1 tablet (75 mg total) by mouth daily, Disp: 90 tablet, Rfl: 3    clopidogrel (PLAVIX) 75 mg tablet, Take 75 mg by mouth daily, Disp: , Rfl:     Comfort EZ Pen Needles 32G X 4 MM MISC, USE 3-4 AS DIRECTED, Disp: 100 each, Rfl: 5    Continuous Blood Gluc  (FreeStyle Frank 14 Day [place]) NATALIA, Use 1 Device continuous, Disp: 1 each, Rfl: 0    Continuous Blood Gluc Sensor (FreeStyle Frank 14 Day Sensor) MISC, Use 1 Device 4 (four) times a day, Disp: 1 each, Rfl: 5    Incontinence Supply Disposable (Incontinence Brief Medium) MISC, Use 4 (four) times a day, Disp: 120 each, Rfl: 0    insulin detemir (Levemir FlexTouch) 100 Units/mL injection pen, Inject 15 Units under the skin daily at bedtime, Disp: 15 mL, Rfl: 3    insulin detemir (LEVEMIR) 100 units/mL subcutaneous injection, Inject 8 Units under the skin daily at bedtime, Disp: 10 mL, Rfl: 0    INSULIN SYRINGE 1CC/29G 29G X 1/2" 1 ML MISC, by Does not apply route, Disp: , Rfl:     Lancet Devices (Lancing Device) MISC, USE AS DIRECTED, Disp: 1 each, Rfl: 0    Lancets MISC, by Does not apply route, Disp: , Rfl:     loperamide (IMODIUM) 2 mg capsule, Take 1 capsule (2 mg total) by mouth 2 (two) times a day as needed for diarrhea [place] carmen tableta dos veces por cynthia si tiene diarrea, Disp: 90 capsule, Rfl: 0    loperamide (IMODIUM) 2 mg capsule, Take 2 mg by mouth 4 (four) times a day as needed for diarrhea, Disp: , Rfl:     Nutritional Supplements (Glucerna Shake) LIQD, Take 1 Bottle by mouth 3 (three) times a day, Disp: 43182 mL, Rfl: 0    OneTouch Ultra test strip, TEST UP TO 3 TIMES A DAY, Disp: 100 each, Rfl: 0    pregabalin (LYRICA) 50 mg capsule, TAKE 1 CAPSULE (50 MG TOTAL) BY MOUTH 3 (THREE) TIMES A DAY, Disp: 90 capsule, Rfl: 5    pregabalin (LYRICA) 50 mg capsule, Take 50 mg by mouth 3 (three) times a day, Disp: , Rfl:     rosuvastatin (CRESTOR) 40 MG tablet, TAKE ONE (1) TABLET BY MOUTH DAILY, Disp: 30 tablet, Rfl: 5    rosuvastatin (CRESTOR) 40 MG tablet, Take 40 mg by mouth daily, Disp: , Rfl:     sodium chloride, PF, 0 9 %, 10 mL by Intracatheter route daily Intracatheter flushing daily   May substitute prefilled syringe with normal saline 10 mL vials, 10 mL syringes, and 18 g blunt needles, Disp: 300 mL, Rfl: 2    sodium chloride, , Disp: , Rfl:     tacrolimus (PROGRAF) 1 mg capsule, TAKE 1 CAPSULE (1 MG TOTAL) BY MOUTH EVERY 12 (TWELVE) HOURS, Disp: 180 capsule, Rfl: 3    tacrolimus (PROGRAF) 1 mg capsule, Take 1 mg by mouth every 12 (twelve) hours, Disp: , Rfl:     tamsulosin (FLOMAX) 0 4 mg, Take 1 capsule (0 4 mg total) by mouth daily with dinner, Disp: 90 capsule, Rfl: 0    tamsulosin (Flomax) 0 4 mg, Take 0 4 mg by mouth daily with dinner, Disp: , Rfl:     temazepam (RESTORIL) 30 mg capsule, TAKE 1 CAPSULE (30 MG TOTAL) BY MOUTH DAILY AT BEDTIME, Disp: 30 capsule, Rfl: 5    zolpidem (AMBIEN) 10 mg tablet, Take 1 tablet (10 mg total) by mouth daily at bedtime, Disp: 30 tablet, Rfl: 3    Current Facility-Administered Medications:     lidocaine (URO-JET, GLYDO) 2 % urethral/mucosal gel 1 application, 1 application, Urethral, Daily PRN, Bry Muller MD  Allergies   Allergen Reactions    Tequin [Gatifloxacin] Rash    Lipitor [Atorvastatin] Other (See Comments)     Rash and itching    Ciprofloxacin     Ciprofloxacin Rash    Iohexol Other (See Comments)     Unknown   Iv Contrast [Iodinated Diagnostic Agents]     Iv Contrast [Iodinated Diagnostic Agents] Other (See Comments)     Unknown    Levofloxacin Rash    Levofloxacin Other (See Comments)     Unknown   Lipitor [Atorvastatin] Rash     Rash and itching    Omnipaque [Iohexol]      There were no vitals filed for this visit

## 2022-09-30 NOTE — PROGRESS NOTES
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  He completed radiation therapy with 36 fractions to bladder and pelvis on 8/25/22  Gemcitabine infusions were discontinued 7/14/22 due to dysuria and fatigue  Today's visit is a 1 month follow-up visit      Interval History:  9/3/22-9/5/22 Admitted [address]  DX: Urinary retention, likely secondary to acute cystitis          Hydronephrosis/CKD           Bladder tumor, s/p chemo/radiation     9/8/22  Dr Harriett Cabrera, Nephrology  Right nephrostomy tube remains in place  Continued f/u with urology  Repeat visit with nephrology in 4 months    Patient is seen for follow-up today with his wife  He reports continued fatigue but this is slowly improving  He has a good appetite and reports no nausea and no vomiting  He denies any hematuria but continues to have dysuria  The dysuria has improved since completion of treatment  He is not able to take Pyridium due to his stage IV chronic kidney disease    Right nephrostomy tube is not causing any pain or discomfort      Upcoming:  10/14/22  CT scan chest, abdomen, and pelvis  10/20/22  Hem/Onc  11/8/21    Urology - Dr Jessi Ross -cystoscopy    Historical Information   Oncology History   Malignant neoplasm of trigone of urinary bladder ([facility] )   6/15/2022 Initial Diagnosis    Malignant neoplasm of trigone of urinary bladder ([facility] )     6/28/2022 - 7/8/2022 Chemotherapy    gemcitabine (GEMZAR) infusion, 27 mg/m2 = 42 6 mg, Intravenous, Once, 1 of 2 cycles  Administration: 42 6 mg (6/28/2022), 42 6 mg (7/1/2022), 42 6 mg (7/5/2022), 42 6 mg (7/8/2022)         Past Medical History:   Diagnosis Date    Alcoholism ([facility] )     Anemia     Bladder cancer ([facility] )     Cerebral artery aneurysm     Change in mental state     last assessed 5/18/15; resolved 10/27/15    Diabetes mellitus ([facility] )     Drug dependence ([facility] )     Fatigue     last assessed 1/26/15; resolved 5/24/16    Hepatitis [facility] discharge follow-up 12/21/2018    Hydronephrosis of right kidney     Kidney disease     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[facility] )     Liver cirrhosis ([facility] )     Liver transplant recipient [facility])     Liver transplant status ([facility] )     Renal disorder     Stroke ([facility] )     Subdural hygroma     2/27/14; resolved 7/28/15    Thrombocytopenia ([facility] )     Thrombocytopenia ([facility] ) 9/20/2017     Past Surgical History:   Procedure Laterality Date    BRAIN SURGERY  02/12/2014    left frontotemporal 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    CATARACT EXTRACTION      CHOLECYSTECTOMY      FL LUMBAR PUNCTURE DIAGNOSTIC  12/14/2018    HEPATITIS B SURFACE AB QN,LIVER TRANSPLANT (HISTORICAL)      IR NEPHROSTOMY TUBE CHECK/CHANGE/REPOSITION/REINSERTION/UPSIZE  7/29/2022    IR NEPHROSTOMY TUBE CHECK/CHANGE/REPOSITION/REINSERTION/UPSIZE  8/31/2022    IR NEPHROSTOMY TUBE PLACEMENT  5/28/2022    IR PICC LINE  12/14/2018    IR PORT PLACEMENT  6/23/2022    LIVER TRANSPLANTATION      ROTATOR CUFF REPAIR      SHOULDER SURGERY      TRANSURETHRAL RESECTION OF BLADDER TUMOR N/A 5/26/2022    Procedure: TRANSURETHRAL RESECTION OF BLADDER TUMOR (TURBT);   Surgeon: Frank Charles MD;  Location: [facility];  Service: Urology       Social History   Social History     Substance and Sexual Activity   Alcohol Use Not Currently     Social History     Substance and Sexual Activity   Drug Use No    Comment: remotely quit drug use per allscripts      Social History     Tobacco Use   Smoking Status Never Smoker   Smokeless Tobacco Never Used   Tobacco Comment    former smoker per allscripts      Meds/Allergies     Current Outpatient Medications:     acetaminophen (TYLENOL) 325 mg tablet, Take 3 tablets (975 mg total) by mouth every 8 (eight) hours, Disp: , Rfl: 0    Alcohol Swabs (B-D SINGLE USE SWABS REGULAR) PADS, USE 2-3 TIMES DAILY AS NEEDED, Disp: , Rfl:     Blood Glucose Calibration (OT ULTRA/FASTTK CNTRL SOLN) SOLN, USE AS DIRECTED, Disp: 1 each, Rfl: 0    clopidogrel (PLAVIX) 75 mg tablet, Take 1 tablet (75 mg total) by mouth daily, Disp: 90 tablet, Rfl: 3    clopidogrel (PLAVIX) 75 mg tablet, Take 75 mg by mouth daily, Disp: , Rfl:     Comfort EZ Pen Needles 32G X 4 MM MISC, USE 3-4 AS DIRECTED, Disp: 100 each, Rfl: 5    Continuous Blood Gluc  (FreeStyle Frank 14 Day [place]) NATALIA, Use 1 Device continuous, Disp: 1 each, Rfl: 0    Continuous Blood Gluc Sensor (FreeStyle Frank 14 Day Sensor) MISC, Use 1 Device 4 (four) times a day, Disp: 1 each, Rfl: 5    insulin detemir (Levemir FlexTouch) 100 Units/mL injection pen, Inject 15 Units under the skin daily at bedtime, Disp: 15 mL, Rfl: 3    insulin detemir (LEVEMIR) 100 units/mL subcutaneous injection, Inject 8 Units under the skin daily at bedtime, Disp: 10 mL, Rfl: 0    INSULIN SYRINGE 1CC/29G 29G X 1/2" 1 ML MISC, by Does not apply route, Disp: , Rfl:     Lancet Devices (Lancing Device) MISC, USE AS DIRECTED, Disp: 1 each, Rfl: 0    Lancets MISC, by Does not apply route, Disp: , Rfl:     loperamide (IMODIUM) 2 mg capsule, Take 1 capsule (2 mg total) by mouth 2 (two) times a day as needed for diarrhea [place] carmen tableta dos veces por cynthia si tiene diarrea, Disp: 90 capsule, Rfl: 0    Nutritional Supplements (Glucerna Shake) LIQD, Take 1 Bottle by mouth 3 (three) times a day, Disp: 16118 mL, Rfl: 0    OneTouch Ultra test strip, TEST UP TO 3 TIMES A DAY, Disp: 100 each, Rfl: 0    pregabalin (LYRICA) 50 mg capsule, TAKE 1 CAPSULE (50 MG TOTAL) BY MOUTH 3 (THREE) TIMES A DAY, Disp: 90 capsule, Rfl: 5    rosuvastatin (CRESTOR) 40 MG tablet, TAKE ONE (1) TABLET BY MOUTH DAILY, Disp: 30 tablet, Rfl: 5    sodium chloride, PF, 0 9 %, 10 mL by Intracatheter route daily Intracatheter flushing daily  May substitute prefilled syringe with normal saline 10 mL vials, 10 mL syringes, and 18 g blunt needles, Disp: 300 mL, Rfl: 2    tacrolimus (PROGRAF) 1 mg capsule, TAKE 1 CAPSULE (1 MG TOTAL) BY MOUTH EVERY 12 (TWELVE) HOURS, Disp: 180 capsule, Rfl: 3    temazepam (RESTORIL) 30 mg capsule, TAKE 1 CAPSULE (30 MG TOTAL) BY MOUTH DAILY AT BEDTIME, Disp: 30 capsule, Rfl: 5    zolpidem (AMBIEN) 10 mg tablet, Take 1 tablet (10 mg total) by mouth daily at bedtime, Disp: 30 tablet, Rfl: 3    Incontinence Supply Disposable (Incontinence Brief Medium) MISC, Use 4 (four) times a day, Disp: 120 each, Rfl: 0    loperamide (IMODIUM) 2 mg capsule, Take 2 mg by mouth 4 (four) times a day as needed for diarrhea, Disp: , Rfl:     pregabalin (LYRICA) 50 mg capsule, Take 50 mg by mouth 3 (three) times a day, Disp: , Rfl:     rosuvastatin (CRESTOR) 40 MG tablet, Take 40 mg by mouth daily, Disp: , Rfl:     sodium chloride, , Disp: , Rfl:     tacrolimus (PROGRAF) 1 mg capsule, Take 1 mg by mouth every 12 (twelve) hours, Disp: , Rfl:     tamsulosin (FLOMAX) 0 4 mg, Take 1 capsule (0 4 mg total) by mouth daily with dinner, Disp: 90 capsule, Rfl: 0    tamsulosin (FLOMAX) 0 4 mg, Take 0 4 mg by mouth daily with dinner, Disp: , Rfl:     Current Facility-Administered Medications:     lidocaine (URO-JET, GLYDO) 2 % urethral/mucosal gel 1 application, 1 application, Urethral, Daily PRN, Tila Pinto MD  Allergies   Allergen Reactions    Tequin [Gatifloxacin] Rash    Lipitor [Atorvastatin] Other (See Comments)     Rash and itching    Ciprofloxacin     Ciprofloxacin Rash    Iohexol Other (See Comments)     Unknown   Iv Contrast [Iodinated Diagnostic Agents]     Iv Contrast [Iodinated Diagnostic Agents] Other (See Comments)     Unknown    Levofloxacin Rash    Levofloxacin Other (See Comments)     Unknown      Lipitor [Atorvastatin] Rash     Rash and itching    Omnipaque [Iohexol]      Review of Systems   Constitutional: Positive for fatigue  Negative for chills and unexpected weight change  HENT: Negative  Respiratory: Positive for shortness of breath (with activity only)  Cardiovascular: Negative  Gastrointestinal: Negative  Endocrine: Positive for cold intolerance  Genitourinary: Positive for enuresis (leaks urine at night)  Negative for hematuria  Nephrostomy tube in place  Draining clear yellow   Musculoskeletal: Positive for gait problem (fell x3 at home) and neck pain  Skin: Negative  Allergic/Immunologic: Negative  Hematological: Bruises/bleeds easily  Psychiatric/Behavioral: Positive for sleep disturbance  The patient is not hyperactive  OBJECTIVE:   /74 (BP Location: Right arm, Patient Position: Sitting, Cuff Size: Standard)   Pulse 93   Temp 98 3 °F (36 8 °C) (Temporal)   Ht 5' 3" (1 6 m)   Wt 56 9 kg (125 lb 6 4 oz)   SpO2 98%   BMI 22 21 kg/m²   Pain Assessment:  0  ECOG/Zubrod/WHO: 1 - Symptomatic but completely ambulatory    Physical Exam  Vitals and nursing note reviewed  Constitutional:       General: He is not in acute distress  Appearance: He is well-developed  He is not diaphoretic  HENT:      Head: Normocephalic and atraumatic  Mouth/Throat:      Pharynx: No oropharyngeal exudate  Eyes:      General: No scleral icterus  Conjunctiva/sclera: Conjunctivae normal       Pupils: Pupils are equal, round, and reactive to light  Neck:      Thyroid: No thyromegaly  Trachea: No tracheal deviation  Cardiovascular:      Rate and Rhythm: Normal rate and regular rhythm  Heart sounds: Normal heart sounds  Pulmonary:      Effort: Pulmonary effort is normal  No respiratory distress  Breath sounds: Normal breath sounds  No stridor  No wheezing, rhonchi or rales  Chest:      Chest wall: No tenderness  Breasts:      Right: No supraclavicular adenopathy  Left: No supraclavicular adenopathy         Abdominal: General: Bowel sounds are normal  There is no distension  Palpations: Abdomen is soft  There is no mass  Tenderness: There is no abdominal tenderness  Musculoskeletal:         General: No swelling or tenderness  Normal range of motion  Cervical back: Normal range of motion and neck supple  Lymphadenopathy:      Cervical: No cervical adenopathy  Upper Body:      Right upper body: No supraclavicular adenopathy  Left upper body: No supraclavicular adenopathy  Lower Body: No right inguinal adenopathy  No left inguinal adenopathy  Skin:     General: Skin is warm and dry  Coloration: Skin is not jaundiced or pale  Findings: No erythema or rash  Neurological:      General: No focal deficit present  Mental Status: He is alert and oriented to person, place, and time  Cranial Nerves: No cranial nerve deficit  Coordination: Coordination normal    Psychiatric:         Mood and Affect: Mood normal          Behavior: Behavior normal          Thought Content:  Thought content normal          Judgment: Judgment normal        RESULTS    Lab Results:   Recent Results (from the past 672 hour(s))   CBC and differential    Collection Time: 09/03/22  8:46 AM   Result Value Ref Range    WBC 8 37 4 31 - 10 16 Thousand/uL    RBC 3 68 (L) 3 88 - 5 62 Million/uL    Hemoglobin 9 1 (L) 12 0 - 17 0 g/dL    Hematocrit 29 9 (L) 36 5 - 49 3 %    MCV 81 (L) 82 - 98 fL    MCH 24 7 (L) 26 8 - 34 3 pg    MCHC 30 4 (L) 31 4 - 37 4 g/dL    RDW 17 7 (H) 11 6 - 15 1 %    MPV 11 9 8 9 - 12 7 fL    Platelets 054 (L) 376 - 390 Thousands/uL    nRBC 0 /100 WBCs    Neutrophils Relative 86 (H) 43 - 75 %    Immat GRANS % 0 0 - 2 %    Lymphocytes Relative 6 (L) 14 - 44 %    Monocytes Relative 6 4 - 12 %    Eosinophils Relative 2 0 - 6 %    Basophils Relative 0 0 - 1 %    Neutrophils Absolute 7 25 1 85 - 7 62 Thousands/µL    Immature Grans Absolute 0 03 0 00 - 0 20 Thousand/uL    Lymphocytes Absolute 0 46 (L) 0 60 - 4 47 Thousands/µL    Monocytes Absolute 0 46 0 17 - 1 22 Thousand/µL    Eosinophils Absolute 0 15 0 00 - 0 61 Thousand/µL    Basophils Absolute 0 02 0 00 - 0 10 Thousands/µL   Basic metabolic panel    Collection Time: 09/03/22  8:46 AM   Result Value Ref Range    Sodium 137 135 - 147 mmol/L    Potassium 4 9 3 5 - 5 3 mmol/L    Chloride 110 (H) 96 - 108 mmol/L    CO2 24 21 - 32 mmol/L    ANION GAP 3 (L) 4 - 13 mmol/L    BUN 48 (H) 5 - 25 mg/dL    Creatinine 2 88 (H) 0 60 - 1 30 mg/dL    Glucose 179 (H) 65 - 140 mg/dL    Calcium 8 7 8 3 - 10 1 mg/dL    eGFR 20 ml/min/1 73sq m   Fingerstick Glucose (POCT)    Collection Time: 09/03/22 12:15 PM   Result Value Ref Range    POC Glucose 134 65 - 140 mg/dl   Fingerstick Glucose (POCT)    Collection Time: 09/03/22  3:28 PM   Result Value Ref Range    POC Glucose 124 65 - 140 mg/dl   Fingerstick Glucose (POCT)    Collection Time: 09/03/22  5:55 PM   Result Value Ref Range    POC Glucose 193 (H) 65 - 140 mg/dl   UA w Reflex to Microscopic w Reflex to Culture    Collection Time: 09/03/22  8:08 PM    Specimen: Urine, Other   Result Value Ref Range    Color, UA Light Orange     Clarity, UA Extra Turbid     Specific [place], UA 1 012 1 003 - 1 030    pH, UA 6 0 4 5, 5 0, 5 5, 6 0, 6 5, 7 0, 7 5, 8 0    Leukocytes, UA Large (A) Negative    Nitrite, UA Negative Negative    Protein,  (2+) (A) Negative mg/dl    Glucose, UA Negative Negative mg/dl    Ketones, UA Negative Negative mg/dl    Urobilinogen, UA <2 0 <2 0 mg/dl mg/dl    Bilirubin, UA Negative Negative    Occult Blood, UA Large (A) Negative   Urine Microscopic    Collection Time: 09/03/22  8:08 PM   Result Value Ref Range    RBC, UA Innumerable (A) None Seen, 1-2 /hpf    WBC, UA Innumerable (A) None Seen, 1-2 /hpf    Epithelial Cells None Seen None Seen, Occasional /hpf    Bacteria, UA Moderate (A) None Seen, Occasional /hpf    WBC Clumps Present     Renal Epithelial Cells Present    Urine culture Collection Time: 09/03/22  8:08 PM    Specimen: Urine, Other   Result Value Ref Range    Urine Culture >100,000 cfu/ml Escherichia coli (A)        Susceptibility    Escherichia coli - FELISHA     ZID Performed Yes       Amoxicillin + Clavulanate <=8/4 Susceptible ug/ml     Ampicillin ($$) >16 00 Resistant ug/ml     Ampicillin + Sulbactam ($) >16/8 Resistant ug/ml     Aztreonam ($$$)  <=4 Susceptible ug/ml     Cefazolin ($) <=2 00 Susceptible ug/ml     Ciprofloxacin ($)  <=0 25 Susceptible ug/ml     Ertapenem ($$$) <=0 5 Susceptible ug/ml     Gentamicin ($$) <=2 Susceptible ug/ml     Levofloxacin ($) <=0 50 Susceptible ug/ml     Nitrofurantoin <=32 Susceptible ug/ml     Piperacillin + Tazobactam ($$$) <=8 Susceptible ug/ml     Tetracycline <=4 Susceptible ug/ml     Tobramycin ($) <=2 Susceptible ug/ml     Trimethoprim + Sulfamethoxazole ($$$) <=0 5/9 5 Susceptible ug/ml   Fingerstick Glucose (POCT)    Collection Time: 09/03/22  9:28 PM   Result Value Ref Range    POC Glucose 169 (H) 65 - 140 mg/dl   Basic metabolic panel    Collection Time: 09/04/22  5:59 AM   Result Value Ref Range    Sodium 136 135 - 147 mmol/L    Potassium 4 8 3 5 - 5 3 mmol/L    Chloride 108 96 - 108 mmol/L    CO2 22 21 - 32 mmol/L    ANION GAP 6 4 - 13 mmol/L    BUN 49 (H) 5 - 25 mg/dL    Creatinine 2 95 (H) 0 60 - 1 30 mg/dL    Glucose 140 65 - 140 mg/dL    Glucose, Fasting 140 (H) 65 - 99 mg/dL    Calcium 8 0 (L) 8 3 - 10 1 mg/dL    eGFR 19 ml/min/1 73sq m   Magnesium    Collection Time: 09/04/22  5:59 AM   Result Value Ref Range    Magnesium 1 5 (L) 1 6 - 2 6 mg/dL   CBC and differential    Collection Time: 09/04/22  5:59 AM   Result Value Ref Range    WBC 6 36 4 31 - 10 16 Thousand/uL    RBC 3 12 (L) 3 88 - 5 62 Million/uL    Hemoglobin 7 6 (L) 12 0 - 17 0 g/dL    Hematocrit 25 4 (L) 36 5 - 49 3 %    MCV 81 (L) 82 - 98 fL    MCH 24 4 (L) 26 8 - 34 3 pg    MCHC 29 9 (L) 31 4 - 37 4 g/dL    RDW 17 6 (H) 11 6 - 15 1 %    MPV 12 1 8 9 - 12 7 fL Platelets 75 (L) 904 - 390 Thousands/uL    nRBC 0 /100 WBCs    Neutrophils Relative 78 (H) 43 - 75 %    Immat GRANS % 1 0 - 2 %    Lymphocytes Relative 7 (L) 14 - 44 %    Monocytes Relative 12 4 - 12 %    Eosinophils Relative 2 0 - 6 %    Basophils Relative 0 0 - 1 %    Neutrophils Absolute 4 99 1 85 - 7 62 Thousands/µL    Immature Grans Absolute 0 03 0 00 - 0 20 Thousand/uL    Lymphocytes Absolute 0 43 (L) 0 60 - 4 47 Thousands/µL    Monocytes Absolute 0 76 0 17 - 1 22 Thousand/µL    Eosinophils Absolute 0 13 0 00 - 0 61 Thousand/µL    Basophils Absolute 0 02 0 00 - 0 10 Thousands/µL   Fingerstick Glucose (POCT)    Collection Time: 09/04/22  6:18 AM   Result Value Ref Range    POC Glucose 147 (H) 65 - 140 mg/dl   Fingerstick Glucose (POCT)    Collection Time: 09/04/22 10:44 AM   Result Value Ref Range    POC Glucose 131 65 - 140 mg/dl   Fingerstick Glucose (POCT)    Collection Time: 09/04/22  4:20 PM   Result Value Ref Range    POC Glucose 230 (H) 65 - 140 mg/dl   Fingerstick Glucose (POCT)    Collection Time: 09/04/22  8:50 PM   Result Value Ref Range    POC Glucose 84 65 - 140 mg/dl   CBC    Collection Time: 09/05/22  5:52 AM   Result Value Ref Range    WBC 5 66 4 31 - 10 16 Thousand/uL    RBC 3 27 (L) 3 88 - 5 62 Million/uL    Hemoglobin 8 0 (L) 12 0 - 17 0 g/dL    Hematocrit 26 2 (L) 36 5 - 49 3 %    MCV 80 (L) 82 - 98 fL    MCH 24 5 (L) 26 8 - 34 3 pg    MCHC 30 5 (L) 31 4 - 37 4 g/dL    RDW 17 2 (H) 11 6 - 15 1 %    Platelets 76 (L) 526 - 390 Thousands/uL    MPV 12 2 8 9 - 12 7 fL   Basic metabolic panel    Collection Time: 09/05/22  5:52 AM   Result Value Ref Range    Sodium 141 135 - 147 mmol/L    Potassium 3 9 3 5 - 5 3 mmol/L    Chloride 112 (H) 96 - 108 mmol/L    CO2 22 21 - 32 mmol/L    ANION GAP 7 4 - 13 mmol/L    BUN 38 (H) 5 - 25 mg/dL    Creatinine 2 42 (H) 0 60 - 1 30 mg/dL    Glucose 70 65 - 140 mg/dL    Calcium 7 9 (L) 8 3 - 10 1 mg/dL    eGFR 25 ml/min/1 73sq m   Fingerstick Glucose (POCT) Collection Time: 09/05/22  6:47 AM   Result Value Ref Range    POC Glucose 79 65 - 140 mg/dl   Fingerstick Glucose (POCT)    Collection Time: 09/05/22 11:25 AM   Result Value Ref Range    POC Glucose 213 (H) 65 - 140 mg/dl   Covid at Home Test Kit    Collection Time: 09/12/22 11:49 AM   Result Value Ref Range    EXT SARS-CoV-2 Ag Home Testing Positive (Patient Reported) (A) Negative (Patient Reported)   ECG 12 lead    Collection Time: 09/20/22 11:08 AM   Result Value Ref Range    Ventricular Rate 78 BPM    Atrial Rate 78 BPM    SC Interval 150 ms    QRSD Interval 72 ms    QT Interval 366 ms    QTC Interval 417 ms    P Axis 50 degrees    QRS Axis 4 degrees    T Wave Axis 39 degrees   Type and screen    Collection Time: 09/20/22 12:10 PM   Result Value Ref Range    ABO Grouping A     Rh Factor Positive     Antibody Screen Negative     Specimen Expiration Date [id number]    CBC and differential    Collection Time: 09/20/22 12:10 PM   Result Value Ref Range    WBC 5 93 4 31 - 10 16 Thousand/uL    RBC 3 50 (L) 3 88 - 5 62 Million/uL    Hemoglobin 8 6 (L) 12 0 - 17 0 g/dL    Hematocrit 27 7 (L) 36 5 - 49 3 %    MCV 79 (L) 82 - 98 fL    MCH 24 6 (L) 26 8 - 34 3 pg    MCHC 31 0 (L) 31 4 - 37 4 g/dL    RDW 16 2 (H) 11 6 - 15 1 %    MPV 11 2 8 9 - 12 7 fL    Platelets 010 (L) 432 - 390 Thousands/uL    nRBC 0 /100 WBCs    Neutrophils Relative 61 43 - 75 %    Immat GRANS % 1 0 - 2 %    Lymphocytes Relative 18 14 - 44 %    Monocytes Relative 13 (H) 4 - 12 %    Eosinophils Relative 7 (H) 0 - 6 %    Basophils Relative 0 0 - 1 %    Neutrophils Absolute 3 60 1 85 - 7 62 Thousands/µL    Immature Grans Absolute 0 04 0 00 - 0 20 Thousand/uL    Lymphocytes Absolute 1 06 0 60 - 4 47 Thousands/µL    Monocytes Absolute 0 77 0 17 - 1 22 Thousand/µL    Eosinophils Absolute 0 44 0 00 - 0 61 Thousand/µL    Basophils Absolute 0 02 0 00 - 0 10 Thousands/µL   Basic metabolic panel    Collection Time: 09/20/22 12:10 PM   Result Value Ref Range Sodium 138 135 - 147 mmol/L    Potassium 4 3 3 5 - 5 3 mmol/L    Chloride 108 96 - 108 mmol/L    CO2 26 21 - 32 mmol/L    ANION GAP 4 4 - 13 mmol/L    BUN 33 (H) 5 - 25 mg/dL    Creatinine 2 28 (H) 0 60 - 1 30 mg/dL    Glucose 87 65 - 140 mg/dL    Calcium 8 5 8 3 - 10 1 mg/dL    eGFR 27 ml/min/1 73sq m   Protime-INR    Collection Time: 09/20/22 12:10 PM   Result Value Ref Range    Protime 14 2 11 6 - 14 5 seconds    INR 1 08 0 84 - 1 19   APTT    Collection Time: 09/20/22 12:10 PM   Result Value Ref Range    PTT 29 23 - 37 seconds   HS Troponin 0hr (reflex protocol)    Collection Time: 09/20/22 12:10 PM   Result Value Ref Range    hs TnI 0hr 8 "Refer to ACS Flowchart"- see link ng/L   Ammonia    Collection Time: 09/20/22 12:10 PM   Result Value Ref Range    Ammonia 23 11 - 35 umol/L   Tacrolimus level    Collection Time: 09/20/22 12:10 PM   Result Value Ref Range    TACROLIMUS 2 4 2 0 - 20 0 ng/mL   ABORh Recheck - Contact Blood Bank Prior to Collection    Collection Time: 09/20/22 12:44 PM   Result Value Ref Range    ABO Grouping A     Rh Factor Positive    HS Troponin I 2hr    Collection Time: 09/20/22  2:52 PM   Result Value Ref Range    hs TnI 2hr 7 "Refer to ACS Flowchart"- see link ng/L    Delta 2hr hsTnI -1 <20 ng/L   UA w Reflex to Microscopic w Reflex to Culture    Collection Time: 09/20/22  2:54 PM    Specimen: Urine, Clean Catch   Result Value Ref Range    Color, UA Colorless     Clarity, UA Clear     Specific [place], UA 1 011 1 003 - 1 030    pH, UA 7 0 4 5, 5 0, 5 5, 6 0, 6 5, 7 0, 7 5, 8 0    Leukocytes, UA Trace (A) Negative    Nitrite, UA Negative Negative    Protein, UA Trace (A) Negative mg/dl    Glucose, UA Negative Negative mg/dl    Ketones, UA Negative Negative mg/dl    Urobilinogen, UA <2 0 <2 0 mg/dl mg/dl    Bilirubin, UA Negative Negative    Occult Blood, UA Negative Negative   Urine Microscopic    Collection Time: 09/20/22  2:54 PM   Result Value Ref Range    RBC, UA 1-2 None Seen, 1-2 /hpf    WBC, UA 2-4 (A) None Seen, 1-2 /hpf    Epithelial Cells None Seen None Seen, Occasional /hpf    Bacteria, UA None Seen None Seen, Occasional /hpf   HS Troponin I 4hr    Collection Time: 09/20/22  4:12 PM   Result Value Ref Range    hs TnI 4hr 9 "Refer to ACS Flowchart"- see link ng/L    Delta 4hr hsTnI 1 <20 ng/L   Fingerstick Glucose (POCT)    Collection Time: 09/20/22  5:10 PM   Result Value Ref Range    POC Glucose 103 65 - 140 mg/dl   Fingerstick Glucose (POCT)    Collection Time: 09/20/22  8:59 PM   Result Value Ref Range    POC Glucose 171 (H) 65 - 140 mg/dl   ECG 12 lead    Collection Time: 09/20/22  9:26 PM   Result Value Ref Range    Ventricular Rate 82 BPM    Atrial Rate 82 BPM    NJ Interval 146 ms    QRSD Interval 74 ms    QT Interval 364 ms    QTC Interval 425 ms    P Axis -7 degrees    QRS Axis -12 degrees    T Wave Axis 17 degrees   ECG 12 lead    Collection Time: 09/20/22  9:26 PM   Result Value Ref Range    Ventricular Rate 79 BPM    Atrial Rate 79 BPM    NJ Interval 146 ms    QRSD Interval 70 ms    QT Interval 372 ms    QTC Interval 426 ms    P Axis -9 degrees    QRS Axis -13 degrees    T Wave Axis 14 degrees   Basic metabolic panel    Collection Time: 09/20/22 10:41 PM   Result Value Ref Range    Sodium 137 135 - 147 mmol/L    Potassium 4 6 3 5 - 5 3 mmol/L    Chloride 106 96 - 108 mmol/L    CO2 27 21 - 32 mmol/L    ANION GAP 4 4 - 13 mmol/L    BUN 31 (H) 5 - 25 mg/dL    Creatinine 2 24 (H) 0 60 - 1 30 mg/dL    Glucose 170 (H) 65 - 140 mg/dL    Glucose, Fasting 170 (H) 65 - 99 mg/dL    Calcium 8 2 (L) 8 3 - 10 1 mg/dL    eGFR 27 ml/min/1 73sq m   Magnesium    Collection Time: 09/20/22 10:41 PM   Result Value Ref Range    Magnesium 1 6 1 6 - 2 6 mg/dL   Comprehensive metabolic panel    Collection Time: 09/21/22  5:21 AM   Result Value Ref Range    Sodium 136 135 - 147 mmol/L    Potassium 4 6 3 5 - 5 3 mmol/L    Chloride 105 96 - 108 mmol/L    CO2 26 21 - 32 mmol/L    ANION GAP 5 4 - 13 mmol/L    BUN 30 (H) 5 - 25 mg/dL    Creatinine 2 20 (H) 0 60 - 1 30 mg/dL    Glucose 103 65 - 140 mg/dL    Calcium 8 3 8 3 - 10 1 mg/dL    Corrected Calcium 9 4 8 3 - 10 1 mg/dL    AST 18 5 - 45 U/L    ALT 18 12 - 78 U/L    Alkaline Phosphatase 71 46 - 116 U/L    Total Protein 7 2 6 4 - 8 4 g/dL    Albumin 2 6 (L) 3 5 - 5 0 g/dL    Total Bilirubin 0 30 0 20 - 1 00 mg/dL    eGFR 28 ml/min/1 73sq m   CBC (With Platelets)    Collection Time: 09/21/22  5:21 AM   Result Value Ref Range    WBC 5 63 4 31 - 10 16 Thousand/uL    RBC 3 44 (L) 3 88 - 5 62 Million/uL    Hemoglobin 8 2 (L) 12 0 - 17 0 g/dL    Hematocrit 27 4 (L) 36 5 - 49 3 %    MCV 80 (L) 82 - 98 fL    MCH 23 8 (L) 26 8 - 34 3 pg    MCHC 29 9 (L) 31 4 - 37 4 g/dL    RDW 15 9 (H) 11 6 - 15 1 %    Platelets 741 (L) 853 - 390 Thousands/uL    MPV 12 0 8 9 - 12 7 fL   Fingerstick Glucose (POCT)    Collection Time: 09/21/22  7:45 AM   Result Value Ref Range    POC Glucose 84 65 - 140 mg/dl   Fingerstick Glucose (POCT)    Collection Time: 09/21/22 11:19 AM   Result Value Ref Range    POC Glucose 77 65 - 140 mg/dl   Fingerstick Glucose (POCT)    Collection Time: 09/21/22  3:59 PM   Result Value Ref Range    POC Glucose 148 (H) 65 - 140 mg/dl   Fingerstick Glucose (POCT)    Collection Time: 09/21/22  4:19 PM   Result Value Ref Range    POC Glucose 146 (H) 65 - 140 mg/dl   Fingerstick Glucose (POCT)    Collection Time: 09/21/22  9:09 PM   Result Value Ref Range    POC Glucose 158 (H) 65 - 140 mg/dl   Fingerstick Glucose (POCT)    Collection Time: 09/22/22  8:04 AM   Result Value Ref Range    POC Glucose 52 (L) 65 - 140 mg/dl   Fingerstick Glucose (POCT)    Collection Time: 09/22/22 11:09 AM   Result Value Ref Range    POC Glucose 105 65 - 140 mg/dl   Uric acid    Collection Time: 09/22/22 12:00 PM   Result Value Ref Range    Uric Acid 4 9 3 5 - 8 5 mg/dL       Imaging Studies:CT abdomen pelvis wo contrast    Result Date: 9/3/2022  Narrative: CT ABDOMEN AND PELVIS WITHOUT IV CONTRAST INDICATION:   Flank pain, kidney stone suspected flank pain  COMPARISON:  June 9, 2022 TECHNIQUE:  CT examination of the abdomen and pelvis was performed without intravenous contrast  Axial, sagittal, and coronal 2D reformatted images were created from the source data and submitted for interpretation  Radiation dose length product (DLP) for this visit:  316 03 mGy-cm   This examination, like all CT scans performed in the [facility], was performed utilizing techniques to minimize radiation dose exposure, including the use of iterative  reconstruction and automated exposure control  Enteric contrast was not administered  FINDINGS: ABDOMEN LOWER CHEST:  Central venous catheter noted at the cavoatrial junction  Mild bibasilar atelectasis  LIVER/BILIARY TREE:  Unremarkable  GALLBLADDER:  No calcified gallstones  No pericholecystic inflammatory change  SPLEEN:  Unremarkable  PANCREAS:  Unremarkable  ADRENAL GLANDS:  Unremarkable  KIDNEYS/URETERS:  Right percutaneous nephrostomy tube noted and terminating in the renal pelvis, unchanged from previous examination  Again noted is atrophy and cortical thinning in the right kidney  Bubble of gas again noted at the upper pole right renal collecting system  No right-sided hydronephrosis  There is mild left hydroureteronephrosis to the level of the ureterovesical junction possibly the result of bladder distention  No left-sided urinary tract calculi  STOMACH AND BOWEL:  Unremarkable  APPENDIX:  A normal appendix was visualized  ABDOMINOPELVIC CAVITY:  No ascites  No pneumoperitoneum  No lymphadenopathy  VESSELS:  Heavy atherosclerotic vascular calcification  No abdominal aortic aneurysm  PELVIS REPRODUCTIVE ORGANS:  Extensive prostatic calcifications reidentified  URINARY BLADDER:  Extensive bladder wall thickening, slightly asymmetrically prominent in the posterior right urinary bladder    Mild perivesical inflammatory stranding with inflammatory stranding extending upwards into the space of Retzius  No bladder calculi  ABDOMINAL WALL/INGUINAL REGIONS:  Injection granulomata noted in the body wall  OSSEOUS STRUCTURES:  Lumbar and bilateral hip osteoarthritic degenerative changes  Mild central collapse of the inferior L2 endplate at the site of a prominent Schmorl's node  Healed right lower rib fractures reidentified  No acute fracture or destructive osseous lesion  Impression: Unchanged appearance of right percutaneous nephrostomy catheter within the collecting system with atrophic right kidney  A bubble of gas within the right renal collecting system is reidentified  Bladder distention, bladder wall thickening, and perivesical inflammatory stranding suggests cystitis  Asymmetric thickening in the posterior right urinary bladder is similar when compared to June 9, 2022 and on this noncontrast examination, plaque like  bladder neoplasm cannot be excluded with confidence  Mild fullness of left renal collecting system and ureter probably related to bladder distention but without left-sided urinary tract calculi evident  Workstation performed: [id number]     XR tibia fibula 2 vw right    Result Date: 9/21/2022  Narrative: TRAUMA SERIES INDICATION:  trauma  COMPARISON:  Chest radiograph 8/5/2022 VIEWS:  XR TRAUMA MULTIPLE  FINDINGS: CHEST: Supine frontal view of the chest is obtained  Cardiomediastinal silhouette is within normal limits accounting for technique and patient positioning  Right-sided chest port with the tip overlying the right atrium  Lungs are clear  No layering pleural effusions detected  No pneumothorax is seen on this supine film  Upright images are more sensitive to detect anterior pneumothoraces if relevant  No acute displaced fractures  Old right rib fractures  Right ankle, tibia and fibula: 4 views reviewed  No acute fracture or dislocation  Meniscal chondrocalcinosis       Impression: No acute cardiopulmonary disease within limitations of supine imaging  No acute osseous abnormality of the ankle, tibia, or fibula  Workstation performed: [id number]     XR ankle 2 vw right    Result Date: 9/21/2022  Narrative: TRAUMA SERIES INDICATION:  trauma  COMPARISON:  Chest radiograph 8/5/2022 VIEWS:  XR TRAUMA MULTIPLE  FINDINGS: CHEST: Supine frontal view of the chest is obtained  Cardiomediastinal silhouette is within normal limits accounting for technique and patient positioning  Right-sided chest port with the tip overlying the right atrium  Lungs are clear  No layering pleural effusions detected  No pneumothorax is seen on this supine film  Upright images are more sensitive to detect anterior pneumothoraces if relevant  No acute displaced fractures  Old right rib fractures  Right ankle, tibia and fibula: 4 views reviewed  No acute fracture or dislocation  Meniscal chondrocalcinosis  Impression: No acute cardiopulmonary disease within limitations of supine imaging  No acute osseous abnormality of the ankle, tibia, or fibula  Workstation performed: [id number]     XR foot 3+ vw right    Result Date: 9/21/2022  Narrative: RIGHT FOOT INDICATION:   fall, bruising to dorsum of right foot, pain  COMPARISON:  None VIEWS:  XR FOOT 3+ VW RIGHT FINDINGS: There is no acute fracture or dislocation  No significant degenerative changes  No lytic or blastic osseous lesion  There are atherosclerotic calcifications  Soft tissues are otherwise unremarkable  Impression: No acute osseous abnormality  Workstation performed: [id number]     TRAUMA - CT head wo contrast    Result Date: 9/20/2022  Narrative: CT BRAIN - WITHOUT CONTRAST INDICATION:   TRAUMA  COMPARISON:  None  TECHNIQUE:  CT examination of the brain was performed  In addition to axial images, sagittal and coronal 2D reformatted images were created and submitted for interpretation  Radiation dose length product (DLP) for this visit:  937 79 mGy-cm     This examination, like all CT scans performed in the [facility], was performed utilizing techniques to minimize radiation dose exposure, including the use of iterative  reconstruction and automated exposure control  IMAGE QUALITY:  Diagnostic  FINDINGS: PARENCHYMA: Decreased attenuation is noted in periventricular and subcortical white matter demonstrating an appearance that is statistically most likely to represent mild microangiopathic change  No CT signs of acute infarction  No intracranial mass, mass effect or midline shift  No acute parenchymal hemorrhage  VENTRICLES AND EXTRA-AXIAL SPACES:  Normal for the patient's age  VISUALIZED ORBITS AND PARANASAL SINUSES:  Unremarkable  CALVARIUM AND EXTRACRANIAL SOFT TISSUES:  The patient is status post left frontotemporal craniotomy status post left MCA aneurysm repair  Impression: No acute intracranial abnormality  Chronic microangiopathic changes  I personally discussed this study with Jaxon Cardenas on 9/20/2022 at 11:28 AM  Workstation performed: [id number]     TRAUMA - CT spine cervical wo contrast    Result Date: 9/20/2022  Narrative: CT CERVICAL SPINE - WITHOUT CONTRAST INDICATION:   TRAUMA  COMPARISON:  None  TECHNIQUE:  CT examination of the cervical spine was performed without intravenous contrast   Contiguous axial images were obtained  Sagittal and coronal reconstructions were performed  Radiation dose length product (DLP) for this visit:  318 26 mGy-cm   This examination, like all CT scans performed in the [facility], was performed utilizing techniques to minimize radiation dose exposure, including the use of iterative  reconstruction and automated exposure control  IMAGE QUALITY:  Diagnostic  FINDINGS: ALIGNMENT:  Normal alignment of the cervical spine  No subluxation  VERTEBRAL BODIES:  No fracture   DEGENERATIVE CHANGES:  Mild to moderate multilevel cervical degenerative changes are noted without critical central canal stenosis  PREVERTEBRAL AND PARASPINAL SOFT TISSUES:  Unremarkable  THORACIC INLET:  Normal      Impression: No cervical spine fracture or traumatic malalignment  I personally discussed this study with Samson Echeverria on 9/20/2022 at 11:28 AM   Workstation performed: [id number]     XR chest 1 view    Result Date: 9/21/2022  Narrative: TRAUMA SERIES INDICATION:  trauma  COMPARISON:  Chest radiograph 8/5/2022 VIEWS:  XR TRAUMA MULTIPLE  FINDINGS: CHEST: Supine frontal view of the chest is obtained  Cardiomediastinal silhouette is within normal limits accounting for technique and patient positioning  Right-sided chest port with the tip overlying the right atrium  Lungs are clear  No layering pleural effusions detected  No pneumothorax is seen on this supine film  Upright images are more sensitive to detect anterior pneumothoraces if relevant  No acute displaced fractures  Old right rib fractures  Right ankle, tibia and fibula: 4 views reviewed  No acute fracture or dislocation  Meniscal chondrocalcinosis  Impression: No acute cardiopulmonary disease within limitations of supine imaging  No acute osseous abnormality of the ankle, tibia, or fibula  Workstation performed: [id number]     XR trauma multiple    Result Date: 9/20/2022  Narrative: TRAUMA SERIES INDICATION:  trauma  COMPARISON:  Chest radiograph 8/5/2022 VIEWS:  XR TRAUMA MULTIPLE  FINDINGS: CHEST: Supine frontal view of the chest is obtained  Cardiomediastinal silhouette is within normal limits accounting for technique and patient positioning  Right-sided chest port with the tip overlying the right atrium  Lungs are clear  No layering pleural effusions detected  No pneumothorax is seen on this supine film  Upright images are more sensitive to detect anterior pneumothoraces if relevant  No acute displaced fractures  Old right rib fractures  Right ankle, tibia and fibula: 4 views reviewed  No acute fracture or dislocation   Meniscal chondrocalcinosis  Impression: No acute cardiopulmonary disease within limitations of supine imaging  No acute osseous abnormality of the ankle, tibia, or fibula  Workstation performed: [id number]     VAS lower limb venous duplex study, unilateral/limited    Result Date: 9/23/2022  Narrative:  [facility] REPORT CLINICAL: Indications: Patient presents with right lower extremity pain and swelling S/P fall  Operative History: Liver Transplant Risk Factors The patient has history of Diabetes (IDDM) and CKD  CONCLUSION: Impression: RIGHT LOWER LIMB No evidence of acute or chronic deep vein thrombosis   No evidence of superficial thrombophlebitis noted  Doppler evaluation shows a normal response to augmentation maneuvers  Popliteal, posterior tibial and anterior tibial arterial Doppler waveforms are triphasic  LEFT LOWER LIMB LIMITED Evaluation shows no evidence of thrombus in the common femoral vein  Doppler evaluation shows a normal response to augmentation maneuvers  Technical findings posted in chart  SIGNATURE: Electronically Signed by: Joce Wallace MD on 2022-09-23 02:16:36 AM    US kidney and bladder with pvr    Result Date: 9/5/2022  Narrative: RENAL ULTRASOUND WITH PVR INDICATION:   LEFT hydro, rule out retention  COMPARISON: Multiple priors most recently CT 9/3/2022 TECHNIQUE:   Ultrasound of the retroperitoneum was performed with a curvilinear transducer utilizing volumetric sweeps and still imaging techniques  FINDINGS: KIDNEYS: Symmetric and normal size  Right kidney:  8 1 x 6 2 x 5 1 cm  Volume 132 6 mL Left kidney:  9 3 x 6 7 x 4 7 cm  Volume 154 8 mL Right kidney Normal echogenicity and contour  No mass is identified  Percutaneous nephrostomy tube No hydronephrosis  No shadowing calculi  No perinephric fluid collections  Left kidney Normal echogenicity and contour  No mass is identified  No hydronephrosis  No shadowing calculi  No perinephric fluid collections  URETERS: Nonvisualized  BLADDER: Normally distended  No focal thickening or mass lesions  Small diverticulum  Left ureteral jet detected  Patient has right-sided percutaneous nephrostomy tube  Prevoid: 69 9 No significant post void volume  Measured post void volume in mL: 4 8     Impression: No hydronephrosis  No significant post void residual volume  Workstation performed: [id number]     US bedside procedure    Result Date: 9/20/2022  Narrative: 1 2 [id number]  6 [id number]  1 [id number]    Assessment/Plan:  No orders of the defined types were placed in this encounter  Rainerenzo Yoel is a 74y o  year old male with 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  He presented in the ED with gross hematuria  Zhannagene Javier had CT scans of his chest, abdomen, and pelvis performed on 5/23/22 that revealed a large bladder mass with persistence of right sided hydronephrosis  There was no evidence of any pelvic lymphadenopathy as well as no metastatic disease to the upper abdomen or chest   He is s/p TURBT on 5/26/22 and s/p nephrostomy tube placement in IR on 5/28/2022 for right-sided hydronephrosis  CT of abdomen and pelvis 6/9/22 revealed asymmetric right posterior bladder wall thickening may reflect treatment related changes after neoplasm resection  Final pathology from (5/26/22) s/p transurethral resection (right posterior bladder tumor, right bladder tumor deep) revealed 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 (detrusor muscle)   He was seen by Urology and was not a candidate for neoadjuvant chemotherapy followed by radical cystectomy given his age, history of liver transplant, and medical problems  Furthermore, he indicated he would not be interested in surgery  We recommended definitive radiation therapy with concurrent chemotherapy over 7 weeks     he completed definitive radiation therapy to the whole pelvis followed by cone-down to the bladder for a total 6480 cGy on August 25, 2022  Treatment was started with gemcitabine chemotherapy but then this was discontinued on July 14, 2022 due to side effects  He returns today for follow-up examination  He is recovering from treatment but continues to have fatigue  He is having no hematuria but does continue to have some dysuria that is improving  He continues to have right nephrostomy tube draining clear tyron urine  He has stage 4 chronic kidney disease and follows with Dr Bettie Cooper from Nephrology  He is not able to take Pyridium due to his chronic kidney disease  He has topical lidocaine (Uro jet) 2% urethral/mucosal gel that was previously ordered but he did not pick this up at the pharmacy because he was not aware  We will see if he can pick this up from his pharmacy  He is scheduled for repeat CT scans on October 14 2022 and then will follow-up with medical oncology on October 20, 2022  He has repeat cystoscopy scheduled November 8, 2022 with Dr Celso Barnes  He will return here for follow-up in 6 months  Ford Phipps  9/30/2022,9:34 AM    Portions of the record may have been created with voice recognition software   Occasional wrong word or "sound a like" substitutions may have occurred due to the inherent limitations of voice recognition software   Read the chart carefully and recognize, using context, where substitutions have occurred

## 2022-10-05 ENCOUNTER — PATIENT OUTREACH (OUTPATIENT)
Dept: CASE MANAGEMENT | Facility: OTHER | Age: 72
End: 2022-10-05

## 2022-10-05 ENCOUNTER — PATIENT OUTREACH (OUTPATIENT)
Dept: FAMILY MEDICINE CLINIC | Facility: CLINIC | Age: 72
End: 2022-10-05

## 2022-10-05 NOTE — PROGRESS NOTES
Received in-basket message regarding past request for Frank glucose meter system  This RN CM no longer on case as patient now followed by Oncology Care Management team   Shira Osborne order was previously sent to [facility]  Call to DME, spoke with Anson Garcia  Pt has an established account  Acct # [de-identified]  As per Anson Garcia, the order was received and benefits verified  As per Micheline, [id number]  Consuelo Norton declined coverage as "pt does not inject enough Insulin to qualify for this system  Recommended contacting [employer] for further clarification or authorization

## 2022-10-05 NOTE — PROGRESS NOTES
Supportive call placed to pt's wife, Lita Dye today  She was informed this writer will be her 's OSW from colleague and expressed appreciation for call  She became tearful stating her  is not motivated to eat well or stay active  She feels this is depression and having a hard time coping with his cancer diagnosis  She and her daughter try very hard to support him, but she does not feel he is responding to them  She stated Marlin Damon has older children from a previous relationship, but they do not call or try to reach out to him  She finds this very disheartening  OSW offered to make a referral to [facility], who will provide telephonic support to her and Marlin Damon  They also have [place] speaking mentors  She was agreeable  She inquired about a Freestyle glucometer that was supposedly ordered by Rogers's PCP  She last heard the insurance was considering a prior auth, but nothing since  Offered to reach out to PCP RN care manager to inquire and she agreed  Lita Dye stated they have a significant bill from [facility]  She stated they have a $35 00 copay that they do not pay because they don't have the money  She believes their total bill is over $3000 00  OSW explained hospital financial counselors can reach out and provide an application for the Financial Hardship Program or [facility], depending where their income lies  She stated any help for this debt will be appreciated  Sent email to hospital financial counselors requesting assistance  Spent time providing emotional support and active listening  She is receptive to continued outreach  Sent online referral to [facility], in-basket message to Vicie Dandy RN CM with PCP, and email to hospital financial counselors  Will continue to follow and provide support

## 2022-10-06 ENCOUNTER — HOSPITAL ENCOUNTER (EMERGENCY)
Facility: HOSPITAL | Age: 72
Discharge: HOME | DRG: 689 | End: 2022-10-07
Attending: EMERGENCY MEDICINE
Payer: COMMERCIAL

## 2022-10-06 ENCOUNTER — APPOINTMENT (EMERGENCY)
Dept: RADIOLOGY | Facility: HOSPITAL | Age: 72
DRG: 689 | End: 2022-10-06
Payer: COMMERCIAL

## 2022-10-06 ENCOUNTER — APPOINTMENT (OUTPATIENT)
Dept: RADIOLOGY | Facility: HOSPITAL | Age: 72
DRG: 689 | End: 2022-10-06
Payer: COMMERCIAL

## 2022-10-06 DIAGNOSIS — R53.83 FATIGUE: Primary | ICD-10-CM

## 2022-10-06 DIAGNOSIS — R63.0 ANOREXIA: ICD-10-CM

## 2022-10-06 DIAGNOSIS — R10.9 RIGHT FLANK PAIN: ICD-10-CM

## 2022-10-06 LAB
ALBUMIN SERPL BCP-MCNC: 3.3 G/DL (ref 3.5–5)
ALP SERPL-CCNC: 79 U/L (ref 46–116)
ALT SERPL W P-5'-P-CCNC: 20 U/L (ref 12–78)
ANION GAP SERPL CALCULATED.3IONS-SCNC: 4 MMOL/L (ref 4–13)
APTT PPP: 25 SECONDS (ref 23–37)
AST SERPL W P-5'-P-CCNC: 15 U/L (ref 5–45)
ATRIAL RATE: 73 BPM
BASOPHILS # BLD AUTO: 0.03 THOUSANDS/ΜL (ref 0–0.1)
BASOPHILS NFR BLD AUTO: 1 % (ref 0–1)
BILIRUB SERPL-MCNC: 0.25 MG/DL (ref 0.2–1)
BUN SERPL-MCNC: 39 MG/DL (ref 5–25)
CALCIUM ALBUM COR SERPL-MCNC: 9.2 MG/DL (ref 8.3–10.1)
CALCIUM SERPL-MCNC: 8.6 MG/DL (ref 8.3–10.1)
CHLORIDE SERPL-SCNC: 105 MMOL/L (ref 96–108)
CO2 SERPL-SCNC: 26 MMOL/L (ref 21–32)
CREAT SERPL-MCNC: 2.38 MG/DL (ref 0.6–1.3)
EOSINOPHIL # BLD AUTO: 0.57 THOUSAND/ΜL (ref 0–0.61)
EOSINOPHIL NFR BLD AUTO: 9 % (ref 0–6)
ERYTHROCYTE [DISTWIDTH] IN BLOOD BY AUTOMATED COUNT: 15.9 % (ref 11.6–15.1)
GFR SERPL CREATININE-BSD FRML MDRD: 25 ML/MIN/1.73SQ M
GLUCOSE SERPL-MCNC: 88 MG/DL (ref 65–140)
HCT VFR BLD AUTO: 28.3 % (ref 36.5–49.3)
HGB BLD-MCNC: 8.7 G/DL (ref 12–17)
IMM GRANULOCYTES # BLD AUTO: 0.02 THOUSAND/UL (ref 0–0.2)
IMM GRANULOCYTES NFR BLD AUTO: 0 % (ref 0–2)
INR PPP: 1.1 (ref 0.84–1.19)
LACTATE SERPL-SCNC: 0.7 MMOL/L (ref 0.5–2)
LYMPHOCYTES # BLD AUTO: 1.19 THOUSANDS/ΜL (ref 0.6–4.47)
LYMPHOCYTES NFR BLD AUTO: 18 % (ref 14–44)
MCH RBC QN AUTO: 23.8 PG (ref 26.8–34.3)
MCHC RBC AUTO-ENTMCNC: 30.7 G/DL (ref 31.4–37.4)
MCV RBC AUTO: 78 FL (ref 82–98)
MONOCYTES # BLD AUTO: 0.59 THOUSAND/ΜL (ref 0.17–1.22)
MONOCYTES NFR BLD AUTO: 9 % (ref 4–12)
NEUTROPHILS # BLD AUTO: 4.14 THOUSANDS/ΜL (ref 1.85–7.62)
NEUTS SEG NFR BLD AUTO: 63 % (ref 43–75)
NRBC BLD AUTO-RTO: 0 /100 WBCS
P AXIS: 39 DEGREES
PLATELET # BLD AUTO: 141 THOUSANDS/UL (ref 149–390)
PMV BLD AUTO: 12 FL (ref 8.9–12.7)
POTASSIUM SERPL-SCNC: 4.1 MMOL/L (ref 3.5–5.3)
PR INTERVAL: 154 MS
PROT SERPL-MCNC: 8 G/DL (ref 6.4–8.4)
PROTHROMBIN TIME: 14.4 SECONDS (ref 11.6–14.5)
QRS AXIS: 4 DEGREES
QRSD INTERVAL: 68 MS
QT INTERVAL: 382 MS
QTC INTERVAL: 420 MS
RBC # BLD AUTO: 3.65 MILLION/UL (ref 3.88–5.62)
SODIUM SERPL-SCNC: 135 MMOL/L (ref 135–147)
T WAVE AXIS: 32 DEGREES
VENTRICULAR RATE: 73 BPM
WBC # BLD AUTO: 6.54 THOUSAND/UL (ref 4.31–10.16)

## 2022-10-06 PROCEDURE — 80053 COMPREHEN METABOLIC PANEL: CPT | Performed by: STUDENT IN AN ORGANIZED HEALTH CARE EDUCATION/TRAINING PROGRAM

## 2022-10-06 PROCEDURE — 85730 THROMBOPLASTIN TIME PARTIAL: CPT | Performed by: STUDENT IN AN ORGANIZED HEALTH CARE EDUCATION/TRAINING PROGRAM

## 2022-10-06 PROCEDURE — 99284 EMERGENCY DEPT VISIT MOD MDM: CPT | Performed by: EMERGENCY MEDICINE

## 2022-10-06 PROCEDURE — 83605 ASSAY OF LACTIC ACID: CPT | Performed by: STUDENT IN AN ORGANIZED HEALTH CARE EDUCATION/TRAINING PROGRAM

## 2022-10-06 PROCEDURE — 96374 THER/PROPH/DIAG INJ IV PUSH: CPT

## 2022-10-06 PROCEDURE — 71045 X-RAY EXAM CHEST 1 VIEW: CPT

## 2022-10-06 PROCEDURE — 36415 COLL VENOUS BLD VENIPUNCTURE: CPT | Performed by: STUDENT IN AN ORGANIZED HEALTH CARE EDUCATION/TRAINING PROGRAM

## 2022-10-06 PROCEDURE — 87040 BLOOD CULTURE FOR BACTERIA: CPT | Performed by: STUDENT IN AN ORGANIZED HEALTH CARE EDUCATION/TRAINING PROGRAM

## 2022-10-06 PROCEDURE — G1004 CDSM NDSC: HCPCS

## 2022-10-06 PROCEDURE — 93010 ELECTROCARDIOGRAM REPORT: CPT | Performed by: INTERNAL MEDICINE

## 2022-10-06 PROCEDURE — 84145 PROCALCITONIN (PCT): CPT | Performed by: STUDENT IN AN ORGANIZED HEALTH CARE EDUCATION/TRAINING PROGRAM

## 2022-10-06 PROCEDURE — 70450 CT HEAD/BRAIN W/O DYE: CPT

## 2022-10-06 PROCEDURE — 85025 COMPLETE CBC W/AUTO DIFF WBC: CPT | Performed by: STUDENT IN AN ORGANIZED HEALTH CARE EDUCATION/TRAINING PROGRAM

## 2022-10-06 PROCEDURE — 74176 CT ABD & PELVIS W/O CONTRAST: CPT

## 2022-10-06 PROCEDURE — 99285 EMERGENCY DEPT VISIT HI MDM: CPT

## 2022-10-06 PROCEDURE — 85610 PROTHROMBIN TIME: CPT | Performed by: STUDENT IN AN ORGANIZED HEALTH CARE EDUCATION/TRAINING PROGRAM

## 2022-10-06 PROCEDURE — 93005 ELECTROCARDIOGRAM TRACING: CPT

## 2022-10-06 RX ORDER — ONDANSETRON 2 MG/ML
4 INJECTION INTRAMUSCULAR; INTRAVENOUS ONCE
Status: COMPLETED | OUTPATIENT
Start: 2022-10-06 | End: 2022-10-06

## 2022-10-06 RX ADMIN — ONDANSETRON 4 MG: 2 INJECTION INTRAMUSCULAR; INTRAVENOUS at 23:00

## 2022-10-07 ENCOUNTER — TELEPHONE (OUTPATIENT)
Dept: OTHER | Facility: OTHER | Age: 72
End: 2022-10-07

## 2022-10-07 ENCOUNTER — PREP FOR PROCEDURE (OUTPATIENT)
Dept: INTERVENTIONAL RADIOLOGY/VASCULAR | Facility: CLINIC | Age: 72
End: 2022-10-07

## 2022-10-07 ENCOUNTER — HOSPITAL ENCOUNTER (OUTPATIENT)
Dept: RADIOLOGY | Facility: HOSPITAL | Age: 72
Discharge: HOME | DRG: 689 | End: 2022-10-07
Payer: COMMERCIAL

## 2022-10-07 VITALS
RESPIRATION RATE: 16 BRPM | TEMPERATURE: 98.3 F | SYSTOLIC BLOOD PRESSURE: 168 MMHG | HEART RATE: 79 BPM | DIASTOLIC BLOOD PRESSURE: 79 MMHG | OXYGEN SATURATION: 100 %

## 2022-10-07 DIAGNOSIS — C67.0 MALIGNANT NEOPLASM OF TRIGONE OF URINARY BLADDER (HCC): Primary | ICD-10-CM

## 2022-10-07 DIAGNOSIS — D49.4 BLADDER TUMOR: Primary | ICD-10-CM

## 2022-10-07 DIAGNOSIS — N13.30 HYDRONEPHROSIS OF RIGHT KIDNEY: ICD-10-CM

## 2022-10-07 DIAGNOSIS — C67.0 MALIGNANT NEOPLASM OF TRIGONE OF URINARY BLADDER (HCC): ICD-10-CM

## 2022-10-07 DIAGNOSIS — N13.30 HYDRONEPHROSIS OF RIGHT KIDNEY: Primary | ICD-10-CM

## 2022-10-07 LAB
BACTERIA UR QL AUTO: NORMAL /HPF
BILIRUB UR QL STRIP: NEGATIVE
CLARITY UR: CLEAR
COLOR UR: ABNORMAL
GLUCOSE UR STRIP-MCNC: NEGATIVE MG/DL
HGB UR QL STRIP.AUTO: NEGATIVE
KETONES UR STRIP-MCNC: NEGATIVE MG/DL
LEUKOCYTE ESTERASE UR QL STRIP: ABNORMAL
NITRITE UR QL STRIP: NEGATIVE
NON-SQ EPI CELLS URNS QL MICRO: NORMAL /HPF
PH UR STRIP.AUTO: 6.5 [PH]
PROCALCITONIN SERPL-MCNC: 0.13 NG/ML
PROT UR STRIP-MCNC: ABNORMAL MG/DL
RBC #/AREA URNS AUTO: NORMAL /HPF
SP GR UR STRIP.AUTO: 1.02 (ref 1–1.03)
UROBILINOGEN UR STRIP-ACNC: <2 MG/DL
WBC #/AREA URNS AUTO: NORMAL /HPF

## 2022-10-07 PROCEDURE — 81001 URINALYSIS AUTO W/SCOPE: CPT | Performed by: STUDENT IN AN ORGANIZED HEALTH CARE EDUCATION/TRAINING PROGRAM

## 2022-10-07 PROCEDURE — C1769 GUIDE WIRE: HCPCS

## 2022-10-07 PROCEDURE — 50434 CONVERT NEPHROSTOMY CATHETER: CPT | Performed by: STUDENT IN AN ORGANIZED HEALTH CARE EDUCATION/TRAINING PROGRAM

## 2022-10-07 PROCEDURE — 0T9630Z DRAINAGE OF RIGHT URETER WITH DRAINAGE DEVICE, PERCUTANEOUS APPROACH: ICD-10-PCS | Performed by: STUDENT IN AN ORGANIZED HEALTH CARE EDUCATION/TRAINING PROGRAM

## 2022-10-07 PROCEDURE — 50434 CONVERT NEPHROSTOMY CATHETER: CPT

## 2022-10-07 PROCEDURE — C2617 STENT, NON-COR, TEM W/O DEL: HCPCS

## 2022-10-07 RX ADMIN — IOPROMIDE 300 MG: 300 INJECTION INTRA-ARTERIAL at 15:19

## 2022-10-07 NOTE — ED PROVIDER NOTES
History  Chief Complaint   Patient presents with    Altered Mental Status     Per son pt  "Tubes draining kidneys and one is shooting fluid back out"  Son reports pt  Is having generalized weakness lately and not eating  Pt  Reports pain but cannot explain where  Pt currently being treated for bladder cancer  HPI    Prior to Admission Medications   Prescriptions Last Dose Informant Patient Reported? Taking?    Alcohol Swabs (B-D SINGLE USE SWABS REGULAR) PADS   Yes No   Sig: USE 2-3 TIMES DAILY AS NEEDED   Blood Glucose Calibration (OT ULTRA/FASTTK CNTRL SOLN) SOLN   No No   Sig: USE AS DIRECTED   Comfort EZ Pen Needles 32G X 4 MM MISC  Spouse/Significant Other No No   Sig: USE 3-4 AS DIRECTED   Continuous Blood Gluc  (FreeStyle Frank 14 Day [place]) NATALIA   No No   Sig: Use 1 Device continuous   Continuous Blood Gluc Sensor (FreeStyle Frank 14 Day Sensor) [facility]   No No   Sig: Use 1 Device 4 (four) times a day   INSULIN SYRINGE 1CC/29G 29G X 1/2" 1 ML MISC  Spouse/Significant Other Yes No   Sig: by Does not apply route   Incontinence Supply Disposable (Incontinence Brief Medium) MISC   No No   Sig: Use 4 (four) times a day   Lancet Devices (Lancing Device) MISC   No No   Sig: USE AS DIRECTED   Lancets MISC  Spouse/Significant Other Yes No   Sig: by Does not apply route   Nutritional Supplements (Glucerna Shake) LIQD   No No   Sig: Take 1 Bottle by mouth 3 (three) times a day   OneTouch Ultra test strip  Spouse/Significant Other No No   Sig: TEST UP TO 3 TIMES A DAY   acetaminophen (TYLENOL) 325 mg tablet   No No   Sig: Take 3 tablets (975 mg total) by mouth every 8 (eight) hours   clopidogrel (PLAVIX) 75 mg tablet  Spouse/Significant Other No No   Sig: Take 1 tablet (75 mg total) by mouth daily   clopidogrel (PLAVIX) 75 mg tablet   Yes No   Sig: Take 75 mg by mouth daily   insulin detemir (LEVEMIR) 100 units/mL subcutaneous injection   No No   Sig: Inject 8 Units under the skin daily at bedtime   insulin detemir (Levemir FlexTouch) 100 Units/mL injection pen   No No   Sig: Inject 15 Units under the skin daily at bedtime   loperamide (IMODIUM) 2 mg capsule   No No   Sig: Take 1 capsule (2 mg total) by mouth 2 (two) times a day as needed for diarrhea [place] [facility]men tableta dos veces por cynthia si tiene diarrea   loperamide (IMODIUM) 2 mg capsule   Yes No   Sig: Take 2 mg by mouth 4 (four) times a day as needed for diarrhea   pregabalin (LYRICA) 50 mg capsule  Spouse/Significant Other No No   Sig: TAKE 1 CAPSULE (50 MG TOTAL) BY MOUTH 3 (THREE) TIMES A DAY   pregabalin (LYRICA) 50 mg capsule   Yes No   Sig: Take 50 mg by mouth 3 (three) times a day   rosuvastatin (CRESTOR) 40 MG tablet   No No   Sig: TAKE ONE (1) TABLET BY MOUTH DAILY   rosuvastatin (CRESTOR) 40 MG tablet   Yes No   Sig: Take 40 mg by mouth daily   sodium chloride   Yes No   sodium chloride, PF, 0 9 %   No No   Sig: 10 mL by Intracatheter route daily Intracatheter flushing daily   May substitute prefilled syringe with normal saline 10 mL vials, 10 mL syringes, and 18 g blunt needles   tacrolimus (PROGRAF) 1 mg capsule   No No   Sig: TAKE 1 CAPSULE (1 MG TOTAL) BY MOUTH EVERY 12 (TWELVE) HOURS   tacrolimus (PROGRAF) 1 mg capsule   Yes No   Sig: Take 1 mg by mouth every 12 (twelve) hours   tamsulosin (FLOMAX) 0 4 mg   No No   Sig: Take 1 capsule (0 4 mg total) by mouth daily with dinner   tamsulosin (FLOMAX) 0 4 mg   Yes No   Sig: Take 0 4 mg by mouth daily with dinner   temazepam (RESTORIL) 30 mg capsule   No No   Sig: TAKE 1 CAPSULE (30 MG TOTAL) BY MOUTH DAILY AT BEDTIME   zolpidem (AMBIEN) 10 mg tablet   No No   Sig: Take 1 tablet (10 mg total) by mouth daily at bedtime      Facility-Administered Medications Last Administration Doses Remaining   lidocaine (URO-JET, GLYDO) 2 % urethral/mucosal gel 1 application None recorded 15          Past Medical History:   Diagnosis Date    [facility] [address] [facility]     Anemia     Bladder cancer (HCC)     Cerebral artery aneurysm     Change in mental state     last assessed 5/18/15; resolved 10/27/15    Diabetes mellitus ([facility] [address] [facility]     Drug dependence ([facility] [address] )     Fatigue     last assessed 1/26/15; resolved 5/24/16    Hepatitis [address] discharge follow-up 12/21/2018    Hydronephrosis of right kidney     Kidney disease     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[facility] [address] )     Liver cirrhosis ([facility] [address] )     [facility] recipient [facility])     Liver transplant status ([facility] [address] )     Renal disorder     Stroke ([facility] [address] )     Subdural hygroma     2/27/14; resolved 7/28/15    Thrombocytopenia ([facility] [address] )     Thrombocytopenia ([facility] [address] ) 9/20/2017       Past Surgical History:   Procedure Laterality Date    BRAIN SURGERY  02/12/2014    left frontotemporal 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    CATARACT EXTRACTION      CHOLECYSTECTOMY      FL LUMBAR PUNCTURE DIAGNOSTIC  12/14/2018    HEPATITIS B SURFACE AB QN,LIVER TRANSPLANT (HISTORICAL)      IR NEPHROSTOMY TUBE CHECK/CHANGE/REPOSITION/REINSERTION/UPSIZE  7/29/2022    IR NEPHROSTOMY TUBE CHECK/CHANGE/REPOSITION/REINSERTION/UPSIZE  8/31/2022    IR NEPHROSTOMY TUBE PLACEMENT  5/28/2022    IR PICC LINE  12/14/2018    IR PORT PLACEMENT  6/23/2022    LIVER TRANSPLANTATION      ROTATOR CUFF REPAIR      SHOULDER SURGERY      TRANSURETHRAL RESECTION OF BLADDER TUMOR N/A 5/26/2022    Procedure: TRANSURETHRAL RESECTION OF BLADDER TUMOR (TURBT); Surgeon: Vero Austin MD;  Location: [facility];  Service: Urology       Family History   Problem Relation Age of Onset    Hypertension Mother         benign essential     Lung cancer Father     Diabetes Sister     Cancer Brother     Stroke Other         cva - due to embolism of cerebra artery      I have reviewed and agree with the history as documented      E-Cigarette/Vaping    E-Cigarette Use Never User      E-Cigarette/Vaping Substances    Nicotine No     THC No     CBD No     Flavoring No     Other No     Unknown No      Social History     Tobacco Use    Smoking status: Never Smoker    Smokeless tobacco: Never Used    Tobacco comment: former smoker per allscripts    Vaping Use    Vaping Use: Never used   Substance Use Topics    Alcohol use: Not Currently    Drug use: No     Comment: remotely quit drug use per allscripts         Review of Systems    Physical Exam  ED Triage Vitals [10/06/22 2147]   Temperature Pulse Respirations Blood Pressure SpO2   98 3 °F (36 8 °C) 86 17 109/63 98 %      Temp Source Heart Rate Source Patient Position - Orthostatic VS BP Location FiO2 (%)   Oral Monitor Sitting Left arm --      Pain Score       --             Orthostatic Vital Signs  Vitals:    10/06/22 2147 10/06/22 2230   BP: 109/63 153/84   Pulse: 86 76   Patient Position - Orthostatic VS: Sitting        Physical Exam    ED Medications  Medications   ondansetron (ZOFRAN) injection 4 mg (4 mg Intravenous Given 10/6/22 2300)       Diagnostic Studies  Results Reviewed     Procedure Component Value Units Date/Time    Procalcitonin [[id number]] Collected: 10/06/22 2303    Lab Status: In process Specimen: Blood from Central Venous Line Updated: 10/06/22 [address] [[id number]] Collected: 10/06/22 2303    Lab Status: In process Specimen: Blood from Central Venous Line Updated: 10/06/22 2312    APTT [[id number]] Collected: 10/06/22 2303    Lab Status: In process Specimen: Blood from Central Venous Line Updated: 10/06/22 2312    Comprehensive metabolic panel [[id number]] Collected: 10/06/22 2303    Lab Status: In process Specimen: Blood from Central Venous Line Updated: 10/06/22 2311    Lactic acid [[id number]] Collected: 10/06/22 2303    Lab Status: In process Specimen: Blood from Central Venous Line Updated: 10/06/22 2311    CBC and differential [[id number]] Collected: 10/06/22 2303    Lab Status:  In process Specimen: Blood from Central Venous Line Updated: 10/06/22 2311    Blood culture #1 [[id number]] Collected: 10/06/22 2303 Lab Status: In process Specimen: Blood from Hand, Left Updated: 10/06/22 2310    Blood culture #2 [[id number]] Collected: 10/06/22 2303    Lab Status: In process Specimen: Blood from Arm, Left Updated: 10/06/22 2310    UA w Reflex to Microscopic w Reflex to Culture [[id number]]     Lab Status: No result Specimen: Urine                  CT abdomen pelvis wo contrast    (Results Pending)   XR chest 1 view portable    (Results Pending)   CT head without contrast    (Results Pending)         Procedures  Procedures      ED Course                             SBIRT 22yo+    Flowsheet Row Most Recent Value   SBIRT (23 yo +)    In order to provide better care to our patients, we are screening all of our patients for alcohol and drug use  Would it be okay to ask you these screening questions? Unable to answer at this time Filed at: 10/06/2022 2201                MDM    Disposition  Final diagnoses:   None     ED Disposition     ED Disposition   Admit    Condition   Stable    Date/Time   Thu Oct 6, 2022 2311    Comment   --         Follow-up Information    None         Patient's Medications   Discharge Prescriptions    No medications on file     No discharge procedures on file  PDMP Review       Value Time User    PDMP Reviewed  Yes 9/20/2022  2:43 PM Elba Salgado MD           ED Provider  Attending physically available and evaluated Jaron Laughter  I managed the patient along with the ED Attending      Electronically Signed by Diagnostic Studies  Results Reviewed     Procedure Component Value Units Date/Time    Blood culture #1 [[id number]] Collected: 10/06/22 2303    Lab Status: Preliminary result Specimen: Blood from Hand, Left Updated: 10/09/22 0703     Blood Culture No Growth at 48 hrs  Blood culture #2 [[id number]] Collected: 10/06/22 2303    Lab Status: Preliminary result Specimen: Blood from Arm, Left Updated: 10/09/22 0703     Blood Culture No Growth at 48 hrs  Procalcitonin [[id number]]  (Normal) Collected: 10/06/22 2303    Lab Status: Final result Specimen: Blood from Central Venous Line Updated: 10/07/22 0712     Procalcitonin 0 13 ng/ml     Urine Microscopic [[id number]]  (Normal) Collected: 10/07/22 0004    Lab Status: Final result Specimen: Urine, Other Updated: 10/07/22 0013     RBC, UA 1-2 /hpf      WBC, UA 1-2 /hpf      Epithelial Cells Occasional /hpf      Bacteria, UA None Seen /hpf     UA w Reflex to Microscopic w Reflex to Culture [[id number]]  (Abnormal) Collected: 10/07/22 0004    Lab Status: Final result Specimen: Urine, Other Updated: 10/07/22 0012     Color, UA Light Yellow     Clarity, UA Clear     Specific Gravity, UA 1 016     pH, UA 6 5     Leukocytes, UA Trace     Nitrite, UA Negative     Protein, UA Trace mg/dl      Glucose, UA Negative mg/dl      Ketones, UA Negative mg/dl      Urobilinogen, UA <2 0 mg/dl      Bilirubin, UA Negative     Occult Blood, UA Negative    Lactic acid [[id number]]  (Normal) Collected: 10/06/22 2303    Lab Status: Final result Specimen: Blood from Central Venous Line Updated: 10/06/22 2358     LACTIC ACID 0 7 mmol/L     Narrative:      Result may be elevated if tourniquet was used during collection      Comprehensive metabolic panel [[id number]]  (Abnormal) Collected: 10/06/22 2303    Lab Status: Final result Specimen: Blood from Central Venous Line Updated: 10/06/22 2353     Sodium 135 mmol/L      Potassium 4 1 mmol/L      Chloride 105 mmol/L      CO2 26 mmol/L      ANION GAP 4 mmol/L      BUN 39 mg/dL      Creatinine 2 38 mg/dL      Glucose 88 mg/dL      Calcium 8 6 mg/dL      Corrected Calcium 9 2 mg/dL      AST 15 U/L      ALT 20 U/L      Alkaline Phosphatase 79 U/L      Total Protein 8 0 g/dL      Albumin 3 3 g/dL      Total Bilirubin 0 25 mg/dL      eGFR 25 ml/min/1 73sq m     Narrative:      National Kidney Disease Foundation guidelines for Chronic Kidney Disease (CKD):   •  Stage 1 with normal or high GFR (GFR > 90 mL/min/1 73 square meters)  •  Stage 2 Mild CKD (GFR = 60-89 mL/min/1 73 square meters)  •  Stage 3A Moderate CKD (GFR = 45-59 mL/min/1 73 square meters)  •  Stage 3B Moderate CKD (GFR = 30-44 mL/min/1 73 square meters)  •  Stage 4 Severe CKD (GFR = 15-29 mL/min/1 73 square meters)  •  Stage 5 End Stage CKD (GFR <15 mL/min/1 73 square meters)  Note: GFR calculation is accurate only with a steady state creatinine    Protime-INR [[id number]]  (Normal) Collected: 10/06/22 2303    Lab Status: Final result Specimen: Blood from Central Venous Line Updated: 10/06/22 2340     Protime 14 4 seconds      INR 1 10    APTT [[id number]]  (Normal) Collected: 10/06/22 2303    Lab Status: Final result Specimen: Blood from Central Venous Line Updated: 10/06/22 2340     PTT 25 seconds     CBC and differential [[id number]]  (Abnormal) Collected: 10/06/22 2303    Lab Status: Final result Specimen: Blood from Central Venous Line Updated: 10/06/22 2325     WBC 6 54 Thousand/uL      RBC 3 65 Million/uL      Hemoglobin 8 7 g/dL      Hematocrit 28 3 %      MCV 78 fL      MCH 23 8 pg      MCHC 30 7 g/dL      RDW 15 9 %      MPV 12 0 fL      Platelets 907 Thousands/uL      nRBC 0 /100 WBCs      Neutrophils Relative 63 %      Immat GRANS % 0 %      Lymphocytes Relative 18 %      Monocytes Relative 9 %      Eosinophils Relative 9 %      Basophils Relative 1 %      Neutrophils Absolute 4 14 Thousands/µL      Immature Grans Absolute 0 02 Thousand/uL      Lymphocytes Absolute 1 19 Thousands/µL Monocytes Absolute 0 59 Thousand/µL      Eosinophils Absolute 0 57 Thousand/µL      Basophils Absolute 0 03 Thousands/µL                  CT abdomen pelvis wo contrast   Final Result by Tomás Martin DO (10/07 0015)      Unchanged appearance of right percutaneous nephrostomy catheter  Gas within the right renal collecting system is again visualized  Urinary bladder wall thickening with asymmetric thickening of the posterior right urinary bladder wall  Findings may represent cystitis  Follow-up with urology is recommended  Workstation performed: [id number]         CT head without contrast   Final Result by Kaveh Lamar MD (10/07 0011)      No acute intracranial abnormality  Workstation performed: [id number]         XR chest 1 view portable   Final Result by Reimngton Crenshaw MD (10/07 1339)      No acute cardiopulmonary disease  Findings are stable            Workstation performed: [id number]               Procedures  Procedures      ED Course                             SBIRT 22yo+    Flowsheet Row Most Recent Value   SBIRT (23 yo +)    In order to provide better care to our patients, we are screening all of our patients for alcohol and drug use  Would it be okay to ask you these screening questions? Unable to answer at this time Filed at: 10/06/2022 2201                [facility]  Number of Diagnoses or Management Options    25-year-old male with a past medical history of liver transplant history of bladder cancer with right nephrostomy tube presents for generalized fatigue decreased oral intake and draining around his right nephrostomy site when being flushed by his wife  Patient has unremarkable exam however plan is to admit patient for decreased appetite accompanied by his chronic medical conditions  Imaging is negative for acute abnormality  Patient refuses admission to the hospital and leaves AMA    Disposition  Final diagnoses:   Fatigue   Anorexia   Right flank pain Time reflects when diagnosis was documented in both MDM as applicable and the Disposition within this note     Time User Action Codes Description Comment    10/7/2022  1:47 AM Lucia Krishan Add [R53 83] Fatigue     10/7/2022  1:47 AM Lucia Krishan Add [R63 0] Anorexia     10/7/2022  1:47 AM Lucia Krishan Add [R10 9] Right flank pain       ED Disposition     ED Disposition   AMA    Condition   --    Date/Time   Fri Oct 7, 2022  1:48 AM    Comment   Date: 10/7/2022  Patient: Reynaldo Cannon  Admitted: 10/6/2022 10:00 PM  Attending Provider: Luann Wilkins MD    Reynaldo Cannon or his authorized caregiver has made the decision for the patient to leave the emergency department against the advice of h is attending physician  He or his authorized caregiver has been informed and understands the inherent risks, including death, disability, permanent organ damage  He or his authorized caregiver has decided to accept the responsibility for this decisi on  Reynaldo Cannon and all necessary parties have been advised that he may return for further evaluation or treatment  His condition at time of discharge was stable    Reynaldo Cannon had current vital signs as follows:  /79   Pulse 79   Temp 98 3 °F  (36 8 °C) (Oral)   Resp 16            Follow-up Information     Follow up With Specialties Details Why Contact Info Additional [address] Emergency Department Emergency Medicine Go to  If symptoms worsen or if you have additional concerns [address] [id number]  [address] Emergency Department, [address] [place], [place], [address] Ashley Stinson DO Family Medicine Schedule an appointment as soon as possible for a visit   [address]  [facility] [address]  [phone]             Discharge Medication List as of 10/7/2022  1:49 AM      CONTINUE these medications which have NOT CHANGED Details   acetaminophen (TYLENOL) 325 mg tablet Take 3 tablets (975 mg total) by mouth every 8 (eight) hours, Starting Thu 9/22/2022, No Print      Alcohol Swabs (B-D SINGLE USE SWABS REGULAR) PADS USE 2-3 TIMES DAILY AS NEEDED, Historical Med      Blood Glucose Calibration (OT ULTRA/FASTTK CNTRL SOLN) SOLN USE AS DIRECTED, Normal      !! clopidogrel (PLAVIX) 75 mg tablet Take 1 tablet (75 mg total) by mouth daily, Starting Mon 8/30/2021, Normal      !! clopidogrel (PLAVIX) 75 mg tablet Take 75 mg by mouth daily, Historical Med      Comfort EZ Pen Needles 32G X 4 MM MISC USE 3-4 AS DIRECTED, Normal      Continuous Blood Gluc  (FreeStyle Frank 14 Day [place]) NATALIA Use 1 Device continuous, Starting Fri 6/3/2022, Normal      Continuous Blood Gluc Sensor (FreeStyle Frank 14 Day Sensor) MISC Use 1 Device 4 (four) times a day, Starting Fri 6/3/2022, Normal      insulin detemir (Levemir FlexTouch) 100 Units/mL injection pen Inject 15 Units under the skin daily at bedtime, Starting Mon 9/26/2022, Normal      insulin detemir (LEVEMIR) 100 units/mL subcutaneous injection Inject 8 Units under the skin daily at bedtime, Starting Thu 9/22/2022, No Print      INSULIN SYRINGE 1CC/29G 29G X 1/2" 1 ML MISC by Does not apply route, Starting Wed 1/11/2012, Historical Med      Lancet Devices (Lancing Device) MISC USE AS DIRECTED, Normal      Lancets MISC by Does not apply route, Starting Fri 5/12/2017, Historical Med      !! loperamide (IMODIUM) 2 mg capsule Take 1 capsule (2 mg total) by mouth 2 (two) times a day as needed for diarrhea [facility] por cynthia si tiene diarrea, Starting Fri 8/26/2022, Normal      Nutritional Supplements (Glucerna Shake) LIQD Take 1 Bottle by mouth 3 (three) times a day, Starting Fri 8/19/2022, Print      OneTouch Ultra test strip TEST UP TO 3 TIMES A DAY, Normal      !! pregabalin (LYRICA) 50 mg capsule TAKE 1 CAPSULE (50 MG TOTAL) BY MOUTH 3 (THREE) TIMES A DAY, Starting Mon 5/23/2022, Normal      sodium chloride, PF, 0 9 % 10 mL by Intracatheter route daily Intracatheter flushing daily  May substitute prefilled syringe with normal saline 10 mL vials, 10 mL syringes, and 18 g blunt needles, Starting Wed 7/20/2022, Until Tue 10/18/2022, Normal      !! tacrolimus (PROGRAF) 1 mg capsule TAKE 1 CAPSULE (1 MG TOTAL) BY MOUTH EVERY 12 (TWELVE) HOURS, Starting Tue 9/27/2022, Normal      temazepam (RESTORIL) 30 mg capsule TAKE 1 CAPSULE (30 MG TOTAL) BY MOUTH DAILY AT BEDTIME, Starting Wed 7/27/2022, Normal      zolpidem (AMBIEN) 10 mg tablet Take 1 tablet (10 mg total) by mouth daily at bedtime, Starting Tue 9/27/2022, Normal      Incontinence Supply Disposable (Incontinence Brief Medium) MISC Use 4 (four) times a day, Starting Fri 8/19/2022, Until Thu 9/29/2022, Print      !! loperamide (IMODIUM) 2 mg capsule Take 2 mg by mouth 4 (four) times a day as needed for diarrhea, Historical Med      !! pregabalin (LYRICA) 50 mg capsule Take 50 mg by mouth 3 (three) times a day, Historical Med      !! rosuvastatin (CRESTOR) 40 MG tablet TAKE ONE (1) TABLET BY MOUTH DAILY, Normal      !! rosuvastatin (CRESTOR) 40 MG tablet Take 40 mg by mouth daily, Historical Med      sodium chloride Starting Wed 7/20/2022, Historical Med      !! tacrolimus (PROGRAF) 1 mg capsule Take 1 mg by mouth every 12 (twelve) hours, Historical Med      !! tamsulosin (FLOMAX) 0 4 mg Take 1 capsule (0 4 mg total) by mouth daily with dinner, Starting Thu 8/4/2022, Until Wed 11/2/2022, Phone In      !! tamsulosin (FLOMAX) 0 4 mg Take 0 4 mg by mouth daily with dinner, Historical Med       !! - Potential duplicate medications found  Please discuss with provider  No discharge procedures on file  PDMP Review       Value Time User    PDMP Reviewed  Yes 9/20/2022  2:43 PM Mu Salgado MD           ED Provider  Attending physically available and evaluated Hannah Duarte   I managed the patient along with the ED Attending      Electronically Signed by         Yessica Coffey DO  10/10/22 8881

## 2022-10-07 NOTE — TELEPHONE ENCOUNTER
Returned call to patient girlfriend  Patient has a nephrostomy  With every flush the nephrostomy tube is leaking from incision  Patient went to Ed not feeling well   Patient girlfriend states they did not check the nephrostomy tube   Patient girlfriend states that flush leaks from skin site when flushing   No resistance  Nephrostomy tube is draining with yellow, cloudy urine and mucus in bag  + chills has not taken his tempature   Reports reduced appetite no eating , increased fatigue has been for last few days  Patients girlfriend states that he did not want to stay at ED  Advised due to reported symptoms and nephrostomy issues reported he should go back to er for evaluation

## 2022-10-07 NOTE — TELEPHONE ENCOUNTER
IR consult was placed for tube check   Should go to the ER for any fevers chills or worsening of symptoms  White count normal limits, tube was in correct position on CT yesterday  His kidney function is at his baseline

## 2022-10-07 NOTE — DISCHARGE INSTRUCTIONS
TUBE CARE INSTRUCTIONS    Care after your procedure:    Resume your normal diet  Small sips of flat soda will help with nausea  1  The properly functioning catheter should be forward flushed once (1x) daily with 10ml of normal saline using clean technique  You will be given a prescription for flushes  To flush the tube, clean both connections with alcohol swab  Twist off the drainage bag/ bulb  tubing and twist the saline syringe into the drainage tube and flush  Remove the syringe and twist the drainage bag / bulb tubing tubing back on     2  The drainage bag/bulb may be emptied as necessary  Keep a record of the amount of fluid you drain from your tube  This should be done with clean technique as well  3  A fresh dressing should be applied daily over the tube insertion site  4  As the tube is secured to the skin with only a suture,try not to pull on your tube  Tub baths are not permitted  Showers are permitted if the patient's skin entry site is prevented from getting wet  Similarly, washcloth "baths" are acceptable  Contact Interventional Radiology at [phone] [place] PATIENTS: Contact Interventional Radiology at [phone]) Adria Macias PATIENTS: Contact Interventional Radiology at [phone]) if:    1  Leakage or large amounts of liquid around the catheter  2  Fever of 101 degrees lasting several hours without other obvious cause (such as sore throat, flu, etc)  3  Persistent nausea or vomiting  4  Diminished drainage, which may be associated with pressure or pain  Or when the     drainage from your tube is less than 10mls for 48 hours  5  Catheter pulled back or falls out  The following pharmacies carry the flush syringes         [facility]                     [facility]  [address]                         [address] [facility]  Phone [phone]            Phone [phone]   [place] [address]                                [phone]  [address]                      [address]  Phone [phone]            Phone [phone]                      Losergio Smoke                                                                                                          [phone]  [facility]  [address] [place] [address]     [address]  Phone [phone]

## 2022-10-07 NOTE — ED ATTENDING ATTESTATION
10/6/2022  IJhoana MD, saw and evaluated the patient  I have discussed the patient with the resident/non-physician practitioner and agree with the resident's/non-physician practitioner's findings, Plan of Care, and MDM as documented in the resident's/non-physician practitioner's note, except where noted  All available labs and Radiology studies were reviewed  I was present for key portions of any procedure(s) performed by the resident/non-physician practitioner and I was immediately available to provide assistance  At this point I agree with the current assessment done in the Emergency Department  I have conducted an independent evaluation of this patient a history and physical is as follows:    ED Course     68-year-old male, history of liver transplant, history of bladder cancer, right nephrostomy tube, presenting to the emergency department for increasing weakness and decreased oral intake  No reported fever  Mild intermittent cough  Patient complains of pain around right nephrostomy tube site  Ten systems reviewed and negative except as noted  The patient is resting comfortably on a stretcher in no acute respiratory distress  The patient appears nontoxic  HEENT reveals moist mucous membranes  Head is normocephalic and atraumatic  Conjunctiva and sclera are normal  Neck is nontender and supple with full range of motion to flexion, extension, lateral rotation  No meningismus appreciated  No masses are appreciated  Lungs are clear to auscultation bilaterally without any wheezes, rales or rhonchi  Heart is regular rate and rhythm without any murmurs, rubs or gallops  Abdomen is soft and nontender without any rebound or guarding  Right nephrostomy tube site is not erythematous  No purulent drainage  Extremities appear grossly normal without any significant arthropathy  Patient is awake, alert, and oriented x3  The patient has normal interaction  Motor is 5 out of 5      Labs Reviewed CBC AND DIFFERENTIAL - Abnormal       Result Value Ref Range Status    WBC 6 54  4 31 - 10 16 Thousand/uL Final    RBC 3 65 (*) 3 88 - 5 62 Million/uL Final    Hemoglobin 8 7 (*) 12 0 - 17 0 g/dL Final    Hematocrit 28 3 (*) 36 5 - 49 3 % Final    MCV 78 (*) 82 - 98 fL Final    MCH 23 8 (*) 26 8 - 34 3 pg Final    MCHC 30 7 (*) 31 4 - 37 4 g/dL Final    RDW 15 9 (*) 11 6 - 15 1 % Final    MPV 12 0  8 9 - 12 7 fL Final    Platelets 461 (*) 115 - 390 Thousands/uL Final    nRBC 0  /100 WBCs Final    Neutrophils Relative 63  43 - 75 % Final    Immat GRANS % 0  0 - 2 % Final    Lymphocytes Relative 18  14 - 44 % Final    Monocytes Relative 9  4 - 12 % Final    Eosinophils Relative 9 (*) 0 - 6 % Final    Basophils Relative 1  0 - 1 % Final    Neutrophils Absolute 4 14  1 85 - 7 62 Thousands/µL Final    Immature Grans Absolute 0 02  0 00 - 0 20 Thousand/uL Final    Lymphocytes Absolute 1 19  0 60 - 4 47 Thousands/µL Final    Monocytes Absolute 0 59  0 17 - 1 22 Thousand/µL Final    Eosinophils Absolute 0 57  0 00 - 0 61 Thousand/µL Final    Basophils Absolute 0 03  0 00 - 0 10 Thousands/µL Final   COMPREHENSIVE METABOLIC PANEL - Abnormal    Sodium 135  135 - 147 mmol/L Final    Potassium 4 1  3 5 - 5 3 mmol/L Final    Chloride 105  96 - 108 mmol/L Final    CO2 26  21 - 32 mmol/L Final    ANION GAP 4  4 - 13 mmol/L Final    BUN 39 (*) 5 - 25 mg/dL Final    Creatinine 2 38 (*) 0 60 - 1 30 mg/dL Final    Comment: Standardized to IDMS reference method    Glucose 88  65 - 140 mg/dL Final    Comment: If the patient is fasting, the ADA then defines impaired fasting glucose as > 100 mg/dL and diabetes as > or equal to 123 mg/dL  Specimen collection should occur prior to Sulfasalazine administration due to the potential for falsely depressed results  Specimen collection should occur prior to Sulfapyridine administration due to the potential for falsely elevated results      Calcium 8 6  8 3 - 10 1 mg/dL Final    Corrected Calcium 9 2  8 3 - 10 1 mg/dL Final    AST 15  5 - 45 U/L Final    Comment: Specimen collection should occur prior to Sulfasalazine administration due to the potential for falsely depressed results  ALT 20  12 - 78 U/L Final    Comment: Specimen collection should occur prior to Sulfasalazine and/or Sulfapyridine administration due to the potential for falsely depressed results  Alkaline Phosphatase 79  46 - 116 U/L Final    Total Protein 8 0  6 4 - 8 4 g/dL Final    Albumin 3 3 (*) 3 5 - 5 0 g/dL Final    Total Bilirubin 0 25  0 20 - 1 00 mg/dL Final    Comment: Use of this assay is not recommended for patients undergoing treatment with eltrombopag due to the potential for falsely elevated results      eGFR 25  ml/min/1 73sq m Final    Narrative:     National Kidney Disease Foundation guidelines for Chronic Kidney Disease (CKD):     Stage 1 with normal or high GFR (GFR > 90 mL/min/1 73 square meters)    Stage 2 Mild CKD (GFR = 60-89 mL/min/1 73 square meters)    Stage 3A Moderate CKD (GFR = 45-59 mL/min/1 73 square meters)    Stage 3B Moderate CKD (GFR = 30-44 mL/min/1 73 square meters)    Stage 4 Severe CKD (GFR = 15-29 mL/min/1 73 square meters)    Stage 5 End Stage CKD (GFR <15 mL/min/1 73 square meters)  Note: GFR calculation is accurate only with a steady state creatinine   UA W REFLEX TO MICROSCOPIC WITH REFLEX TO CULTURE - Abnormal    Color, UA Light Yellow   Final    Clarity, UA Clear   Final    Specific [place], UA 1 016  1 003 - 1 030 Final    pH, UA 6 5  4 5, 5 0, 5 5, 6 0, 6 5, 7 0, 7 5, 8 0 Final    Leukocytes, UA Trace (*) Negative Final    Nitrite, UA Negative  Negative Final    Protein, UA Trace (*) Negative mg/dl Final    Glucose, UA Negative  Negative mg/dl Final    Ketones, UA Negative  Negative mg/dl Final    Urobilinogen, UA <2 0  <2 0 mg/dl mg/dl Final    Bilirubin, UA Negative  Negative Final    Occult Blood, UA Negative  Negative Final   LACTIC ACID, PLASMA - Normal    LACTIC ACID 0 7 0 5 - 2 0 mmol/L Final    Narrative:     Result may be elevated if tourniquet was used during collection  PROTIME-INR - Normal    Protime 14 4  11 6 - 14 5 seconds Final    INR 1 10  0 84 - 1 19 Final   APTT - Normal    PTT 25  23 - 37 seconds Final    Comment: Therapeutic Heparin Range =  60-90 seconds   URINE MICROSCOPIC - Normal    RBC, UA 1-2  None Seen, 1-2 /hpf Final    WBC, UA 1-2  None Seen, 1-2 /hpf Final    Epithelial Cells Occasional  None Seen, Occasional /hpf Final    Bacteria, UA None Seen  None Seen, Occasional /hpf Final       CT abdomen pelvis wo contrast   Final Result      Unchanged appearance of right percutaneous nephrostomy catheter  Gas within the right renal collecting system is again visualized  Urinary bladder wall thickening with asymmetric thickening of the posterior right urinary bladder wall  Findings may represent cystitis  Follow-up with urology is recommended  Workstation performed: [id number]         CT head without contrast   Final Result      No acute intracranial abnormality  Workstation performed: [id number]         XR chest 1 view portable   Final Result      No acute cardiopulmonary disease        Findings are stable            Workstation performed: [id number]                       Critical Care Time  Procedures

## 2022-10-07 NOTE — TELEPHONE ENCOUNTER
Contacted IR to schedule an appt for tube check Yohana  IR providers are reviewing   Advised by IR they will contact patient to schedule   Returned call to patients daughter Kailyn Nance ,  reports that patient is slightly confused , not eating or drinking + slight nausea and vomited x1  They did speak with him this morning he does not want to go to Ed  Daughter reports that he does not even want to go to palliative care appts " Patient does not think they are helping him" per daughter  Patient daughter is also requesting that our practice may need to speak with him as well  Advised per providers CRNP from our practice  Should go back to Ed for evaluation with worsening of symptoms  Advised IR will contact him with an asap appt for nephrostomy tube  Contacted RN at [facility] about patients daughter request  She will reach out to patients daughter

## 2022-10-07 NOTE — TELEPHONE ENCOUNTER
Called the patient and left a voicemail asking to call the office to discuss upcoming appointment  Family requesting an earlier appointment? ?

## 2022-10-07 NOTE — TELEPHONE ENCOUNTER
Patient has a nephrotomy tube and she notice when she is flushing his tube that at the incision it is leaking out of it  Please give her a call back

## 2022-10-07 NOTE — SEDATION DOCUMENTATION
IR PCN exchange to PCNU by Maryana Walker  Patient tolerated procedure well  Tube capped per Dr Rodriguez request  AVS given to patient and patient d/c home

## 2022-10-08 ENCOUNTER — APPOINTMENT (OUTPATIENT)
Dept: RADIOLOGY | Facility: HOSPITAL | Age: 72
DRG: 689 | End: 2022-10-08
Payer: COMMERCIAL

## 2022-10-08 ENCOUNTER — APPOINTMENT (EMERGENCY)
Dept: RADIOLOGY | Facility: HOSPITAL | Age: 72
DRG: 689 | End: 2022-10-08
Payer: COMMERCIAL

## 2022-10-08 ENCOUNTER — HOSPITAL ENCOUNTER (INPATIENT)
Facility: HOSPITAL | Age: 72
LOS: 5 days | Discharge: HOME HEALTH | DRG: 689 | End: 2022-10-13
Attending: SURGERY | Admitting: SURGERY
Payer: COMMERCIAL

## 2022-10-08 DIAGNOSIS — N39.0 UTI (URINARY TRACT INFECTION): ICD-10-CM

## 2022-10-08 DIAGNOSIS — Z94.4 HISTORY OF LIVER TRANSPLANT (HCC): ICD-10-CM

## 2022-10-08 DIAGNOSIS — G47.00 INSOMNIA: ICD-10-CM

## 2022-10-08 DIAGNOSIS — G62.9 NEUROPATHY: ICD-10-CM

## 2022-10-08 DIAGNOSIS — Z79.899 POLYPHARMACY: ICD-10-CM

## 2022-10-08 DIAGNOSIS — N17.9 AKI (ACUTE KIDNEY INJURY) (HCC): ICD-10-CM
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
## 2022-10-08 DIAGNOSIS — G93.40 ACUTE ENCEPHALOPATHY: ICD-10-CM

## 2022-10-08 DIAGNOSIS — Z87.448 HISTORY OF HYDRONEPHROSIS: ICD-10-CM

## 2022-10-08 DIAGNOSIS — R41.89 COGNITIVE IMPAIRMENT: ICD-10-CM

## 2022-10-08 PROBLEM — N18.4 CHRONIC KIDNEY DISEASE (CKD), STAGE IV (SEVERE) (HCC): Status: ACTIVE | Noted: 2022-10-08

## 2022-10-08 PROBLEM — Z85.51 HISTORY OF BLADDER CANCER: Status: ACTIVE | Noted: 2022-10-08

## 2022-10-08 PROBLEM — D69.6 THROMBOCYTOPENIA (HCC): Status: ACTIVE | Noted: 2022-10-08

## 2022-10-08 PROBLEM — S01.81XA FACIAL LACERATION: Status: ACTIVE | Noted: 2022-10-08

## 2022-10-08 PROBLEM — E11.9 DM II (DIABETES MELLITUS, TYPE II), CONTROLLED (HCC): Status: ACTIVE | Noted: 2022-10-08

## 2022-10-08 PROBLEM — R91.1 PULMONARY NODULE: Status: ACTIVE | Noted: 2022-10-08

## 2022-10-08 PROBLEM — D64.9 ANEMIA: Status: ACTIVE | Noted: 2022-10-08

## 2022-10-08 LAB
2HR DELTA HS TROPONIN: 1 NG/L
2HR DELTA HS TROPONIN: 1 NG/L
4HR DELTA HS TROPONIN: 3 NG/L
4HR DELTA HS TROPONIN: 3 NG/L
ALBUMIN SERPL BCP-MCNC: 2.9 G/DL (ref 3.5–5)
ALBUMIN SERPL BCP-MCNC: 2.9 G/DL (ref 3.5–5)
ALP SERPL-CCNC: 76 U/L (ref 46–116)
ALP SERPL-CCNC: 76 U/L (ref 46–116)
ALT SERPL W P-5'-P-CCNC: 17 U/L (ref 12–78)
ALT SERPL W P-5'-P-CCNC: 17 U/L (ref 12–78)
ANION GAP SERPL CALCULATED.3IONS-SCNC: 5 MMOL/L (ref 4–13)
ANION GAP SERPL CALCULATED.3IONS-SCNC: 5 MMOL/L (ref 4–13)
APTT PPP: 24 SECONDS (ref 23–37)
APTT PPP: 24 SECONDS (ref 23–37)
AST SERPL W P-5'-P-CCNC: 12 U/L (ref 5–45)
AST SERPL W P-5'-P-CCNC: 12 U/L (ref 5–45)
ATRIAL RATE: 103 BPM
ATRIAL RATE: 103 BPM
BACTERIA UR QL AUTO: ABNORMAL /HPF
BACTERIA UR QL AUTO: ABNORMAL /HPF
BASOPHILS # BLD AUTO: 0.03 THOUSANDS/ΜL (ref 0–0.1)
BASOPHILS # BLD AUTO: 0.03 THOUSANDS/ΜL (ref 0–0.1)
BASOPHILS NFR BLD AUTO: 0 % (ref 0–1)
BASOPHILS NFR BLD AUTO: 0 % (ref 0–1)
BILIRUB DIRECT SERPL-MCNC: 0.12 MG/DL (ref 0–0.2)
BILIRUB DIRECT SERPL-MCNC: 0.12 MG/DL (ref 0–0.2)
BILIRUB SERPL-MCNC: 0.26 MG/DL (ref 0.2–1)
BILIRUB SERPL-MCNC: 0.26 MG/DL (ref 0.2–1)
BILIRUB UR QL STRIP: NEGATIVE
BILIRUB UR QL STRIP: NEGATIVE
BUN SERPL-MCNC: 45 MG/DL (ref 5–25)
BUN SERPL-MCNC: 45 MG/DL (ref 5–25)
CALCIUM SERPL-MCNC: 8.3 MG/DL (ref 8.3–10.1)
CALCIUM SERPL-MCNC: 8.3 MG/DL (ref 8.3–10.1)
CARDIAC TROPONIN I PNL SERPL HS: 5 NG/L
CARDIAC TROPONIN I PNL SERPL HS: 5 NG/L
CARDIAC TROPONIN I PNL SERPL HS: 6 NG/L
CARDIAC TROPONIN I PNL SERPL HS: 6 NG/L
CARDIAC TROPONIN I PNL SERPL HS: 8 NG/L
CARDIAC TROPONIN I PNL SERPL HS: 8 NG/L
CHLORIDE SERPL-SCNC: 106 MMOL/L (ref 96–108)
CHLORIDE SERPL-SCNC: 106 MMOL/L (ref 96–108)
CLARITY UR: ABNORMAL
CLARITY UR: ABNORMAL
CO2 SERPL-SCNC: 24 MMOL/L (ref 21–32)
CO2 SERPL-SCNC: 24 MMOL/L (ref 21–32)
COLOR UR: ABNORMAL
COLOR UR: ABNORMAL
CREAT SERPL-MCNC: 2.7 MG/DL (ref 0.6–1.3)
CREAT SERPL-MCNC: 2.7 MG/DL (ref 0.6–1.3)
EOSINOPHIL # BLD AUTO: 0.36 THOUSAND/ΜL (ref 0–0.61)
EOSINOPHIL # BLD AUTO: 0.36 THOUSAND/ΜL (ref 0–0.61)
EOSINOPHIL NFR BLD AUTO: 3 % (ref 0–6)
EOSINOPHIL NFR BLD AUTO: 3 % (ref 0–6)
ERYTHROCYTE [DISTWIDTH] IN BLOOD BY AUTOMATED COUNT: 15.8 % (ref 11.6–15.1)
ERYTHROCYTE [DISTWIDTH] IN BLOOD BY AUTOMATED COUNT: 15.8 % (ref 11.6–15.1)
GFR SERPL CREATININE-BSD FRML MDRD: 22 ML/MIN/1.73SQ M
GFR SERPL CREATININE-BSD FRML MDRD: 22 ML/MIN/1.73SQ M
GLUCOSE SERPL-MCNC: 103 MG/DL (ref 65–140)
GLUCOSE SERPL-MCNC: 103 MG/DL (ref 65–140)
GLUCOSE SERPL-MCNC: 170 MG/DL (ref 65–140)
GLUCOSE SERPL-MCNC: 170 MG/DL (ref 65–140)
GLUCOSE SERPL-MCNC: 97 MG/DL (ref 65–140)
GLUCOSE SERPL-MCNC: 97 MG/DL (ref 65–140)
GLUCOSE UR STRIP-MCNC: ABNORMAL MG/DL
GLUCOSE UR STRIP-MCNC: ABNORMAL MG/DL
HCT VFR BLD AUTO: 28.3 % (ref 36.5–49.3)
HCT VFR BLD AUTO: 28.3 % (ref 36.5–49.3)
HGB BLD-MCNC: 8.9 G/DL (ref 12–17)
HGB BLD-MCNC: 8.9 G/DL (ref 12–17)
HGB UR QL STRIP.AUTO: ABNORMAL
HGB UR QL STRIP.AUTO: ABNORMAL
IMM GRANULOCYTES # BLD AUTO: 0.05 THOUSAND/UL (ref 0–0.2)
IMM GRANULOCYTES # BLD AUTO: 0.05 THOUSAND/UL (ref 0–0.2)
IMM GRANULOCYTES NFR BLD AUTO: 1 % (ref 0–2)
IMM GRANULOCYTES NFR BLD AUTO: 1 % (ref 0–2)
INR PPP: 1.14 (ref 0.84–1.19)
INR PPP: 1.14 (ref 0.84–1.19)
KETONES UR STRIP-MCNC: NEGATIVE MG/DL
KETONES UR STRIP-MCNC: NEGATIVE MG/DL
LEUKOCYTE ESTERASE UR QL STRIP: ABNORMAL
LEUKOCYTE ESTERASE UR QL STRIP: ABNORMAL
LYMPHOCYTES # BLD AUTO: 0.65 THOUSANDS/ΜL (ref 0.6–4.47)
LYMPHOCYTES # BLD AUTO: 0.65 THOUSANDS/ΜL (ref 0.6–4.47)
LYMPHOCYTES NFR BLD AUTO: 6 % (ref 14–44)
LYMPHOCYTES NFR BLD AUTO: 6 % (ref 14–44)
MCH RBC QN AUTO: 24.3 PG (ref 26.8–34.3)
MCH RBC QN AUTO: 24.3 PG (ref 26.8–34.3)
MCHC RBC AUTO-ENTMCNC: 31.4 G/DL (ref 31.4–37.4)
MCHC RBC AUTO-ENTMCNC: 31.4 G/DL (ref 31.4–37.4)
MCV RBC AUTO: 77 FL (ref 82–98)
MCV RBC AUTO: 77 FL (ref 82–98)
MONOCYTES # BLD AUTO: 0.89 THOUSAND/ΜL (ref 0.17–1.22)
MONOCYTES # BLD AUTO: 0.89 THOUSAND/ΜL (ref 0.17–1.22)
MONOCYTES NFR BLD AUTO: 9 % (ref 4–12)
MONOCYTES NFR BLD AUTO: 9 % (ref 4–12)
NEUTROPHILS # BLD AUTO: 8.46 THOUSANDS/ΜL (ref 1.85–7.62)
NEUTROPHILS # BLD AUTO: 8.46 THOUSANDS/ΜL (ref 1.85–7.62)
NEUTS SEG NFR BLD AUTO: 81 % (ref 43–75)
NEUTS SEG NFR BLD AUTO: 81 % (ref 43–75)
NITRITE UR QL STRIP: NEGATIVE
NITRITE UR QL STRIP: NEGATIVE
NON-SQ EPI CELLS URNS QL MICRO: ABNORMAL /HPF
NON-SQ EPI CELLS URNS QL MICRO: ABNORMAL /HPF
NRBC BLD AUTO-RTO: 0 /100 WBCS
NRBC BLD AUTO-RTO: 0 /100 WBCS
P AXIS: 18 DEGREES
P AXIS: 18 DEGREES
PH UR STRIP.AUTO: 6.5 [PH]
PH UR STRIP.AUTO: 6.5 [PH]
PLATELET # BLD AUTO: 140 THOUSANDS/UL (ref 149–390)
PLATELET # BLD AUTO: 140 THOUSANDS/UL (ref 149–390)
PMV BLD AUTO: 11 FL (ref 8.9–12.7)
PMV BLD AUTO: 11 FL (ref 8.9–12.7)
POTASSIUM SERPL-SCNC: 4.4 MMOL/L (ref 3.5–5.3)
POTASSIUM SERPL-SCNC: 4.4 MMOL/L (ref 3.5–5.3)
PR INTERVAL: 134 MS
PR INTERVAL: 134 MS
PROCALCITONIN SERPL-MCNC: 0.15 NG/ML
PROCALCITONIN SERPL-MCNC: 0.15 NG/ML
PROT SERPL-MCNC: 7.4 G/DL (ref 6.4–8.4)
PROT SERPL-MCNC: 7.4 G/DL (ref 6.4–8.4)
PROT UR STRIP-MCNC: ABNORMAL MG/DL
PROT UR STRIP-MCNC: ABNORMAL MG/DL
PROTHROMBIN TIME: 14.9 SECONDS (ref 11.6–14.5)
PROTHROMBIN TIME: 14.9 SECONDS (ref 11.6–14.5)
QRS AXIS: -17 DEGREES
QRS AXIS: -17 DEGREES
QRSD INTERVAL: 80 MS
QRSD INTERVAL: 80 MS
QT INTERVAL: 314 MS
QT INTERVAL: 314 MS
QTC INTERVAL: 411 MS
QTC INTERVAL: 411 MS
RBC # BLD AUTO: 3.66 MILLION/UL (ref 3.88–5.62)
RBC # BLD AUTO: 3.66 MILLION/UL (ref 3.88–5.62)
RBC #/AREA URNS AUTO: ABNORMAL /HPF
RBC #/AREA URNS AUTO: ABNORMAL /HPF
SODIUM SERPL-SCNC: 135 MMOL/L (ref 135–147)
SODIUM SERPL-SCNC: 135 MMOL/L (ref 135–147)
SP GR UR STRIP.AUTO: 1.02 (ref 1–1.03)
SP GR UR STRIP.AUTO: 1.02 (ref 1–1.03)
T WAVE AXIS: 62 DEGREES
T WAVE AXIS: 62 DEGREES
UROBILINOGEN UR STRIP-ACNC: <2 MG/DL
UROBILINOGEN UR STRIP-ACNC: <2 MG/DL
VENTRICULAR RATE: 103 BPM
VENTRICULAR RATE: 103 BPM
WBC # BLD AUTO: 10.44 THOUSAND/UL (ref 4.31–10.16)
WBC # BLD AUTO: 10.44 THOUSAND/UL (ref 4.31–10.16)
WBC #/AREA URNS AUTO: ABNORMAL /HPF
WBC #/AREA URNS AUTO: ABNORMAL /HPF
WBC CLUMPS # UR AUTO: PRESENT /UL
WBC CLUMPS # UR AUTO: PRESENT /UL

## 2022-10-08 PROCEDURE — 80076 HEPATIC FUNCTION PANEL: CPT | Performed by: EMERGENCY MEDICINE

## 2022-10-08 PROCEDURE — 93308 TTE F-UP OR LMTD: CPT | Performed by: SURGERY

## 2022-10-08 PROCEDURE — 12013 RPR F/E/E/N/L/M 2.6-5.0 CM: CPT | Performed by: SURGERY

## 2022-10-08 PROCEDURE — 99285 EMERGENCY DEPT VISIT HI MDM: CPT

## 2022-10-08 PROCEDURE — 81001 URINALYSIS AUTO W/SCOPE: CPT | Performed by: EMERGENCY MEDICINE

## 2022-10-08 PROCEDURE — 93005 ELECTROCARDIOGRAM TRACING: CPT

## 2022-10-08 PROCEDURE — 99222 1ST HOSP IP/OBS MODERATE 55: CPT | Performed by: INTERNAL MEDICINE

## 2022-10-08 PROCEDURE — NC001 PR NO CHARGE: Performed by: EMERGENCY MEDICINE

## 2022-10-08 PROCEDURE — 85730 THROMBOPLASTIN TIME PARTIAL: CPT | Performed by: SURGERY

## 2022-10-08 PROCEDURE — 0HQ1XZZ REPAIR FACE SKIN, EXTERNAL APPROACH: ICD-10-PCS | Performed by: SURGERY

## 2022-10-08 PROCEDURE — 85025 COMPLETE CBC W/AUTO DIFF WBC: CPT | Performed by: SURGERY

## 2022-10-08 PROCEDURE — 80048 BASIC METABOLIC PNL TOTAL CA: CPT | Performed by: SURGERY

## 2022-10-08 PROCEDURE — 84484 ASSAY OF TROPONIN QUANT: CPT | Performed by: SURGERY

## 2022-10-08 PROCEDURE — 36415 COLL VENOUS BLD VENIPUNCTURE: CPT | Performed by: SURGERY

## 2022-10-08 PROCEDURE — 93010 ELECTROCARDIOGRAM REPORT: CPT | Performed by: INTERNAL MEDICINE

## 2022-10-08 PROCEDURE — 72125 CT NECK SPINE W/O DYE: CPT

## 2022-10-08 PROCEDURE — 99205 OFFICE O/P NEW HI 60 MIN: CPT | Performed by: SURGERY

## 2022-10-08 PROCEDURE — 71260 CT THORAX DX C+: CPT

## 2022-10-08 PROCEDURE — 82948 REAGENT STRIP/BLOOD GLUCOSE: CPT

## 2022-10-08 PROCEDURE — 84145 PROCALCITONIN (PCT): CPT | Performed by: INTERNAL MEDICINE

## 2022-10-08 PROCEDURE — 70450 CT HEAD/BRAIN W/O DYE: CPT

## 2022-10-08 PROCEDURE — 71045 X-RAY EXAM CHEST 1 VIEW: CPT

## 2022-10-08 PROCEDURE — 85610 PROTHROMBIN TIME: CPT | Performed by: SURGERY

## 2022-10-08 PROCEDURE — 76705 ECHO EXAM OF ABDOMEN: CPT | Performed by: SURGERY

## 2022-10-08 PROCEDURE — 74177 CT ABD & PELVIS W/CONTRAST: CPT

## 2022-10-08 RX ORDER — ACETAMINOPHEN 325 MG/1
650 TABLET ORAL EVERY 6 HOURS PRN
Status: DISCONTINUED | OUTPATIENT
Start: 2022-10-08 | End: 2022-10-13 | Stop reason: HOSPADM

## 2022-10-08 RX ORDER — PREGABALIN 50 MG/1
50 CAPSULE ORAL 3 TIMES DAILY
COMMUNITY
End: 2022-10-13

## 2022-10-08 RX ORDER — TAMSULOSIN HYDROCHLORIDE 0.4 MG/1
0.4 CAPSULE ORAL
COMMUNITY
End: 2022-10-24

## 2022-10-08 RX ORDER — ZOLPIDEM TARTRATE 10 MG/1
10 TABLET ORAL
COMMUNITY
End: 2022-10-13

## 2022-10-08 RX ORDER — TACROLIMUS 1 MG/1
1 CAPSULE ORAL EVERY 12 HOURS SCHEDULED
Status: DISCONTINUED | OUTPATIENT
Start: 2022-10-08 | End: 2022-10-13 | Stop reason: HOSPADM

## 2022-10-08 RX ORDER — HEPARIN SODIUM 5000 [USP'U]/ML
5000 INJECTION, SOLUTION INTRAVENOUS; SUBCUTANEOUS EVERY 8 HOURS SCHEDULED
Status: DISCONTINUED | OUTPATIENT
Start: 2022-10-08 | End: 2022-10-13 | Stop reason: HOSPADM

## 2022-10-08 RX ORDER — TEMAZEPAM 7.5 MG/1
30 CAPSULE ORAL
Status: ON HOLD | COMMUNITY
End: 2022-10-13 | Stop reason: SDUPTHER

## 2022-10-08 RX ORDER — LOPERAMIDE HYDROCHLORIDE 2 MG/1
2 CAPSULE ORAL 2 TIMES DAILY PRN
COMMUNITY

## 2022-10-08 RX ORDER — CLOPIDOGREL BISULFATE 75 MG/1
75 TABLET ORAL DAILY
COMMUNITY

## 2022-10-08 RX ORDER — INSULIN LISPRO 100 [IU]/ML
1-5 INJECTION, SOLUTION INTRAVENOUS; SUBCUTANEOUS
Status: DISCONTINUED | OUTPATIENT
Start: 2022-10-08 | End: 2022-10-13 | Stop reason: HOSPADM

## 2022-10-08 RX ORDER — TEMAZEPAM 15 MG/1
15 CAPSULE ORAL
Status: DISCONTINUED | OUTPATIENT
Start: 2022-10-08 | End: 2022-10-08 | Stop reason: SDUPTHER

## 2022-10-08 RX ORDER — CLOPIDOGREL BISULFATE 75 MG/1
75 TABLET ORAL DAILY
Status: DISCONTINUED | OUTPATIENT
Start: 2022-10-09 | End: 2022-10-13 | Stop reason: HOSPADM

## 2022-10-08 RX ORDER — LOPERAMIDE HYDROCHLORIDE 2 MG/1
2 CAPSULE ORAL 2 TIMES DAILY PRN
Status: DISCONTINUED | OUTPATIENT
Start: 2022-10-08 | End: 2022-10-13 | Stop reason: HOSPADM

## 2022-10-08 RX ORDER — SODIUM CHLORIDE 0.9 % (FLUSH) 0.9 %
10 SYRINGE (ML) INJECTION DAILY
COMMUNITY

## 2022-10-08 RX ORDER — ATORVASTATIN CALCIUM 80 MG/1
80 TABLET, FILM COATED ORAL
Status: DISCONTINUED | OUTPATIENT
Start: 2022-10-08 | End: 2022-10-13 | Stop reason: HOSPADM

## 2022-10-08 RX ORDER — ZOLPIDEM TARTRATE 5 MG/1
5 TABLET ORAL
Status: DISCONTINUED | OUTPATIENT
Start: 2022-10-08 | End: 2022-10-10

## 2022-10-08 RX ORDER — LIDOCAINE HYDROCHLORIDE AND EPINEPHRINE 10; 10 MG/ML; UG/ML
10 INJECTION, SOLUTION INFILTRATION; PERINEURAL ONCE
Status: COMPLETED | OUTPATIENT
Start: 2022-10-08 | End: 2022-10-08

## 2022-10-08 RX ORDER — TAMSULOSIN HYDROCHLORIDE 0.4 MG/1
0.4 CAPSULE ORAL
Status: DISCONTINUED | OUTPATIENT
Start: 2022-10-08 | End: 2022-10-13 | Stop reason: HOSPADM

## 2022-10-08 RX ORDER — MAGNESIUM HYDROXIDE/ALUMINUM HYDROXICE/SIMETHICONE 120; 1200; 1200 MG/30ML; MG/30ML; MG/30ML
30 SUSPENSION ORAL EVERY 6 HOURS PRN
Status: DISCONTINUED | OUTPATIENT
Start: 2022-10-08 | End: 2022-10-13 | Stop reason: HOSPADM

## 2022-10-08 RX ORDER — TACROLIMUS 1 MG/1
1 CAPSULE ORAL EVERY 12 HOURS SCHEDULED
COMMUNITY

## 2022-10-08 RX ORDER — ROSUVASTATIN CALCIUM 40 MG/1
40 TABLET, COATED ORAL DAILY
COMMUNITY

## 2022-10-08 RX ORDER — ACETAMINOPHEN 325 MG/1
975 TABLET ORAL ONCE
Status: COMPLETED | OUTPATIENT
Start: 2022-10-08 | End: 2022-10-08

## 2022-10-08 RX ORDER — PREGABALIN 50 MG/1
50 CAPSULE ORAL 3 TIMES DAILY
Status: DISCONTINUED | OUTPATIENT
Start: 2022-10-08 | End: 2022-10-12

## 2022-10-08 RX ORDER — INSULIN LISPRO 100 [IU]/ML
1-6 INJECTION, SOLUTION INTRAVENOUS; SUBCUTANEOUS
Status: DISCONTINUED | OUTPATIENT
Start: 2022-10-09 | End: 2022-10-13 | Stop reason: HOSPADM

## 2022-10-08 RX ORDER — ONDANSETRON 2 MG/ML
4 INJECTION INTRAMUSCULAR; INTRAVENOUS EVERY 6 HOURS PRN
Status: DISCONTINUED | OUTPATIENT
Start: 2022-10-08 | End: 2022-10-13 | Stop reason: HOSPADM

## 2022-10-08 RX ADMIN — HEPARIN SODIUM 5000 UNITS: 5000 INJECTION INTRAVENOUS; SUBCUTANEOUS at 21:33

## 2022-10-08 RX ADMIN — ACETAMINOPHEN 975 MG: 325 TABLET, FILM COATED ORAL at 16:22

## 2022-10-08 RX ADMIN — CEFTRIAXONE SODIUM 1000 MG: 10 INJECTION, POWDER, FOR SOLUTION INTRAVENOUS at 22:00

## 2022-10-08 RX ADMIN — TAMSULOSIN HYDROCHLORIDE 0.4 MG: 0.4 CAPSULE ORAL at 20:55

## 2022-10-08 RX ADMIN — IOHEXOL 100 ML: 350 INJECTION, SOLUTION INTRAVENOUS at 15:00

## 2022-10-08 RX ADMIN — ATORVASTATIN CALCIUM 80 MG: 80 TABLET, FILM COATED ORAL at 20:53

## 2022-10-08 RX ADMIN — PREGABALIN 50 MG: 50 CAPSULE ORAL at 20:53

## 2022-10-08 RX ADMIN — LIDOCAINE HYDROCHLORIDE,EPINEPHRINE BITARTRATE 10 ML: 10; .01 INJECTION, SOLUTION INFILTRATION; PERINEURAL at 15:04

## 2022-10-08 RX ADMIN — TACROLIMUS 1 MG: 1 CAPSULE ORAL at 23:30

## 2022-10-08 NOTE — ASSESSMENT & PLAN NOTE
RT upper lobe 9mm, new since 2021; suspicious for metastatic disease  Will need arrangement for PET scan as outpt  Pt to f/u with oncologist - Dr Karol Velez results and need for follow up with his significant other

## 2022-10-08 NOTE — ASSESSMENT & PLAN NOTE
Lab Results   Component Value Date    EGFR 22 10/08/2022    CREATININE 2 70 (H) 10/08/2022   Cr w/in baseline range  Hx of hydronephrosis w/ RT PCN in place; s/p recent tube check on 10/7

## 2022-10-08 NOTE — PROCEDURES
Laceration repair    Date/Time: 10/8/2022 3:34 PM  Performed by: Francisca Smalls MD  Authorized by: Francisca Smalls MD   Consent: Verbal consent obtained  Risks and benefits: risks, benefits and alternatives were discussed  Consent given by: patient  Patient understanding: patient states understanding of the procedure being performed  Required items: required blood products, implants, devices, and special equipment available  Patient identity confirmed: verbally with patient  Body area: head/neck  Location details: forehead  Laceration length: 3 cm  Foreign bodies: no foreign bodies  Tendon involvement: none  Nerve involvement: none  Vascular damage: no  Anesthesia: local infiltration    Anesthesia:  Local Anesthetic: lidocaine 1% with epinephrine  Anesthetic total: 2 mL    Sedation:  Patient sedated: no        Procedure Details:  Preparation: Patient was prepped and draped in the usual sterile fashion    Irrigation solution: saline  Irrigation method: syringe  Amount of cleaning: standard  Skin closure: 6-0 nylon  Number of sutures: 6  Technique: simple  Approximation: close  Approximation difficulty: simple  Patient tolerance: patient tolerated the procedure well with no immediate complications

## 2022-10-08 NOTE — H&P
[address] [facility]  H&P- Xena Ribera 1950, 74 y o  male MRN: [id number]  Unit/Bed#: ED 10 Encounter: [id number]  Primary Care Provider: Vladimir Hylton DO   Date and time admitted to hospital: 10/8/2022  2:12 PM    * Fall  Assessment & Plan  Hx of multiple falls for which he has been admitted multiple times  Had been attributed to orthostatic hypotension  And w/ FTT  Pt unable to care for himself  Goal was to place in SNF on last admission however pt ends up signing out AMA  Unclear of medical decision capacity thus will obtain neuropsych eval  S/p mckeon ct   Resulting facial laceration s/p sutures  Cont supportive care  Fall precaution  PT/OT  Check orthostatics    Facial laceration  Assessment & Plan  S/p suturing      Chronic kidney disease (CKD), stage IV (severe) ([facility] [address] )  Assessment & Plan  Lab Results   Component Value Date    EGFR 22 10/08/2022    CREATININE 2 70 (H) 10/08/2022   Cr w/in baseline range  Hx of hydronephrosis w/ RT PCN in place; s/p recent tube check on 10/7    Pulmonary nodule  Assessment & Plan  RT upper lobe 9mm, new since 2021; suspicious for metastatic disease  Will need arrangement for PET scan as outpt  Pt to f/u with oncologist - Dr Tanvir Jaquez results and need for follow up with his significant other    History of hydronephrosis  Assessment & Plan  W/ RT PCN  Per his significant other she states it was leaking around the tube and pt had come into IR for tube check yesterday  She states she has noticed urine drainage had contained mucus  Presenting UA with pyuria   F/u urine cx  Will empirically txt with CTX  Mild leukocytosis  Will check procal  Will consult urology    Acute encephalopathy  Assessment & Plan  Underlying cognitive impairment as confirmed per his significant other   Pt w/ prior hx of CVA  Worsening question if due to metabolic encephalopathy due to UTI  Will obtain neuropsych eval  Pt's significant other has POA, she will bring in the document once found    Insomnia  Assessment & Plan  On both rrestoril and ambien verified via pdmp  Will decrease ambien to 5mg and restoril to 15mg and make PRN instead  Not sure if could be contributing to falls    Thrombocytopenia [facility])  Assessment & Plan  Chronic    Anemia  Assessment & Plan  Secondary to anemia of chronic disese  No sign of bleeding    DM II (diabetes mellitus, type II), controlled ([facility] [address] )  Assessment & Plan  No results found for: HGBA1C    No results for input(s): POCGLU in the last 72 hours  Blood Sugar Average: Last 72 hrs:    HgA1c of 6 8  Cont levemir  Place on ISS    History of liver transplant ([facility] [address] )  Assessment & Plan  On prograf    History of bladder cancer  Assessment & Plan  S/p chemo/RT  Pt follows with oncology and palliative care    VTE Prophylaxis: Heparin  / sequential compression device   Code Status: Full code  POLST: There is no POLST form on file for this patient (pre-hospital)  Discussion with family: Called his significant other and discussed plan of care with her at length    Anticipated Length of Stay:  Patient will be admitted on an Inpatient basis with an anticipated length of stay of  > 2 midnights  Justification for Hospital Stay: Fall, FTT    Total Time for Visit, including Counseling / Coordination of Care: 60 minutes  Greater than 50% of this total time spent on direct patient counseling and coordination of care  Chief Complaint:   Fall    History of Present Illness:    Juan Robin is a 74 y o  male with extensive medical hx including CKD stg IV, bladder cancer, DM II, CVA was brought in to the ED due to fall  Pt reports that he had fallen out of been hitting the right side of his head  He denies LOC or prodromal symptoms  Pt with hx of multiple admission due to fall, but on last one ended up signing out AMA  His significant other reports pt is more confused  than usual, not eating well and is unable to care for himself     Upon presentation to the ED, trauma alert was initiated  Pt received suturing of facial laceration  He is cleared from trauma standpoint  Review of Systems:    Review of Systems   Genitourinary:        Chronic dysuria secondary to bladder cancer   Musculoskeletal: Positive for arthralgias, back pain and gait problem  Psychiatric/Behavioral: Positive for confusion  Past Medical and Surgical History:     No past medical history on file  No past surgical history on file  Meds/Allergies:    Prior to Admission medications    Not on File     I have reviewed home medications using allscripts  Allergies: Not on File    Social History:     Marital Status: Single   Occupation:   Patient Pre-hospital Living Situation: Resides at home  Patient Pre-hospital Level of Mobility: Uses assisted devices  Patient Pre-hospital Diet Restrictions: none  Substance Use History:   Social History     Substance and Sexual Activity   Alcohol Use Not on file     Social History     Tobacco Use   Smoking Status Not on file   Smokeless Tobacco Not on file     Social History     Substance and Sexual Activity   Drug Use Not on file       Family History:    No family history on file  Physical Exam:     Vitals:   Blood Pressure: 114/76 (10/08/22 1830)  Pulse: 94 (10/08/22 1830)  Temperature: 97 8 °F (36 6 °C) (10/08/22 1415)  Temp Source: Tympanic (10/08/22 1415)  Respirations: 14 (10/08/22 1830)  Weight - Scale: 57 4 kg (126 lb 8 7 oz) (10/08/22 1424)  SpO2: 99 % (10/08/22 1830)    Physical Exam  Constitutional:       Comments: Oriented to person, place, yr but not month   HENT:      Head:      Comments: + RT temporal laceration  Cardiovascular:      Rate and Rhythm: Normal rate and regular rhythm  Pulses: Normal pulses  Heart sounds: Normal heart sounds  No murmur heard  Pulmonary:      Effort: Pulmonary effort is normal  No respiratory distress  Breath sounds: Normal breath sounds  No wheezing or rales     Abdominal:      General: Bowel sounds are normal  There is no distension  Palpations: Abdomen is soft  Tenderness: There is no abdominal tenderness  There is no guarding  Genitourinary:     Comments: + RT nephrostomy tube  Musculoskeletal:         General: Normal range of motion  Right lower leg: No edema  Left lower leg: No edema  Skin:     General: Skin is warm and dry  Neurological:      General: No focal deficit present  Mental Status: He is alert  Mental status is at baseline  Cranial Nerves: No cranial nerve deficit  Motor: No weakness  Additional Data:     Lab Results: I have personally reviewed pertinent reports  Results from last 7 days   Lab Units 10/08/22  1509   WBC Thousand/uL 10 44*   HEMOGLOBIN g/dL 8 9*   HEMATOCRIT % 28 3*   PLATELETS Thousands/uL 140*   NEUTROS PCT % 81*   LYMPHS PCT % 6*   MONOS PCT % 9   EOS PCT % 3     Results from last 7 days   Lab Units 10/08/22  1509   SODIUM mmol/L 135   POTASSIUM mmol/L 4 4   CHLORIDE mmol/L 106   CO2 mmol/L 24   BUN mg/dL 45*   CREATININE mg/dL 2 70*   ANION GAP mmol/L 5   CALCIUM mg/dL 8 3   ALBUMIN g/dL 2 9*   TOTAL BILIRUBIN mg/dL 0 26   ALK PHOS U/L 76   ALT U/L 17   AST U/L 12   GLUCOSE RANDOM mg/dL 170*     Results from last 7 days   Lab Units 10/08/22  1509   INR  1 14     Results from last 7 days   Lab Units 10/08/22  1827   POC GLUCOSE mg/dl 103               Imaging: I have personally reviewed pertinent reports  TRAUMA - CT head wo contrast   Final Result by Pura Crisostomo MD (10/08 1519)      No acute intracranial abnormality  Stable chronic microangiopathic changes within the brain  No change since recent prior  Workstation performed: [id number]         TRAUMA - CT spine cervical wo contrast   Final Result by Pura Crisostomo MD (10/08 1509)      No cervical spine fracture or traumatic malalignment                     Workstation performed: [id number]         TRAUMA - CT chest abdomen pelvis w contrast   Final Result by Red Pastor MD (10/08 1533)      1  No evidence of traumatic injury in the chest, abdomen or pelvis  2   Right upper lobe solid nodule measuring 9 mm, new since 2021, suspicious for metastatic disease  3   Right percutaneous nephroureterostomy in place with mild right hydronephrosis and a focus of air in the right renal collecting system, similar to the prior study  Delayed right nephrogram       4   Stable diffuse urinary bladder wall thickening with a focus of air in the urinary bladder, likely related to indwelling stent  I personally discussed this study with Ying Miles on 10/8/2022 at 3:32 PM                 Workstation performed: [id number]         XR Trauma multiple (B/RA trauma bay ONLY)   Final Result by Clau Encarnacion MD (10/08 1722)      No acute cardiopulmonary disease within limitations of supine imaging  Workstation performed: [id number]         XR chest 1 view    (Results Pending)       EKG, Pathology, and Other Studies Reviewed on Admission:   · EKG: Sinus tachy @ 103bpm, nml axis    Allscripts / Epic Records Reviewed: Yes     ** Please Note: This note has been constructed using a voice recognition system   **

## 2022-10-08 NOTE — ASSESSMENT & PLAN NOTE
- patient has had multiple falls in the past few weeks, 2-3 falls over the past 24 hours  - CT head, c-spine, c/a/p negative for acute traumatic injuries  - will admit to medicine for further workup and PT/OT  - tertiary exam on 10/9:

## 2022-10-08 NOTE — ED PROVIDER NOTES
Emergency Department Airway Evaluation and Management Form    History  Obtained from:  EMS  Patient has no allergy information on record  No chief complaint on file  HPI   24-year-old male, fall with right head strike, on Plavix  No past medical history on file  No past surgical history on file  No family history on file  I have reviewed and agree with the history as documented  Review of Systems   Right head pain    Physical Exam  /77   Pulse 97   Temp 97 8 °F (36 6 °C) (Tympanic)   Resp 20   SpO2 99%     Physical Exam   Awake alert oriented x3  Patent airway  Lungs clear to auscultation bilaterally  Heart rate regular  2 cm, gaping laceration over right eyebrow without bleeding  No other external signs of trauma      ED Medications  Medications - No data to display    Intubation  Procedures   None    Notes      Final Diagnosis  Final diagnoses:   None       ED Provider  Electronically Signed by     Rosie Foy DO  10/08/22 6558

## 2022-10-08 NOTE — ASSESSMENT & PLAN NOTE
Hx of multiple falls for which he has been admitted multiple times  Had been attributed to orthostatic hypotension  And w/ FTT  Pt unable to care for himself   Goal was to place in SNF on last admission however pt ends up signing out AMA  Unclear of medical decision capacity thus will obtain neuropsych eval  S/p mckeon ct   Resulting facial laceration s/p sutures  Cont supportive care  Fall precaution  PT/OT  Check orthostatics

## 2022-10-08 NOTE — ASSESSMENT & PLAN NOTE
Underlying cognitive impairment as confirmed per his significant other   Pt w/ prior hx of CVA  Worsening question if due to metabolic encephalopathy due to UTI  Will obtain neuropsych eval  Pt's significant other has POA, she will bring in the document once found

## 2022-10-08 NOTE — H&P
H&P - Trauma   Keerthi Murillo 74 y o  male MRN: [id number]  Unit/Bed#: TR 02 Encounter: [id number]    Trauma Alert: Level B   Model of Arrival: Ambulance    Trauma Team: Attending Elsa Duncan and Residents Antonieta Dye  Consultants:     None     Assessment/Plan   Active Problems / Assessment:   - multiple falls  - facial laceration     Plan:   Multiple falls  - CT head, c-spine and c/a/p negative for acute traumatic injuries  - will obtain CBC, BMP, LFTs, EKG, trop, EKG  - will admit to medicine for further workup    Facial laceration  - repaired with sutures  - will need removal in 5-7 days    History of Present Illness     Chief Complaint: headache  Mechanism:Fall     HPI:    Keerthi Murillo is a 74 y o  male with past medical history of diabetes, bladder cancer, right sided nephrostomy tube, CKD and cirrhosis s/p liver transplant in 2003 who presents as level B trauma alert after a fall on Plavix  Patient apparently fell out of bed 30 minutes prior to arrival  He hit his head  He has a laceration above the right eye brow  He states he has pain in his head  He does not remember how he fell  He states he has 2-3 falls over the past 24 hours  Patient was recently seen in the ER 2 days ago and was admitted two weeks ago for similar symptoms but left AMA both times  His significant other states he has been more confused over the past few days and has not been eating or drinking  She does not think he can take care of himself at home  Review of Systems   Constitutional: Negative for fever  Eyes: Negative for visual disturbance  Respiratory: Negative for shortness of breath  Cardiovascular: Negative for chest pain  Gastrointestinal: Negative for abdominal pain, nausea and vomiting  Musculoskeletal: Negative for arthralgias, back pain and neck pain  Skin: Positive for wound  Neurological: Positive for headaches  Negative for dizziness, weakness, light-headedness and numbness     All other systems reviewed and are negative  12-point, complete review of systems was reviewed and negative except as stated above  Historical Information     No past medical history on file  No past surgical history on file  Unable to obtain history due to Acute encephalopathy         There is no immunization history on file for this patient  Last Tetanus: 8/2022  Family History: Non-contributory    1  Before the illness or injury that brought you to the Emergency, did you need someone to help you on a regular basis? 1=Yes   2  Since the illness or injury that brought you to the Emergency, have you needed more help than usual to take care of yourself? 1=Yes   3  Have you been hospitalized for one or more nights during the past 6 months (excluding a stay in the Emergency Department)? 1=Yes   4  In general, do you see well? 0=Yes   5  In general, do you have serious problems with your memory? 1=Yes   6  Do you take more than three different medications everyday? 1=Yes   TOTAL   5     Did you order a geriatric consult if the score was 2 or greater?: admitted to medicine, geriatrics consult deferred to primary team     Meds/Allergies   all current active meds have been reviewed  Allergies have not been reviewed;   Not on File    Objective   Initial Vitals:   Temperature: 97 8 °F (36 6 °C) (10/08/22 1415)  Pulse: 97 (10/08/22 1415)  Respirations: 20 (10/08/22 1415)  Blood Pressure: 139/77 (10/08/22 1415)    Primary Survey:   Airway:        Status: patent;        Pre-hospital Interventions: none        Hospital Interventions: none  Breathing:        Pre-hospital Interventions: none       Effort: normal       Right breath sounds: normal       Left breath sounds: normal  Circulation:        Rhythm: regular       Rate: regular   Right Pulses Left Pulses    R radial: 2+  R femoral: 2+  R pedal: 2+     L radial: 2+  L femoral: 2+  L pedal: 2+       Disability:        GCS: Eye: 4; Verbal: 5 Motor: 6 Total: 15       Right Pupil: 3 mm;  round;  reactive Left Pupil:  3 mm;  round;  reactive      R Motor Strength L Motor Strength    R : 5/5  R dorsiflex: 5/5  R plantarflex: 5/5 L : 5/5  L dorsiflex: 5/5  L plantarflex: 5/5        Sensory:  No sensory deficit  Exposure:       Completed: Yes      Secondary Survey:  Physical Exam  Vitals and nursing note reviewed  Constitutional:       General: He is not in acute distress  Appearance: Normal appearance  He is normal weight  He is not ill-appearing, toxic-appearing or diaphoretic  HENT:      Head: Normocephalic  Comments: 3 cm laceration above the right eyebrow     Right Ear: External ear normal       Left Ear: External ear normal       Nose: Nose normal       Mouth/Throat:      Mouth: Mucous membranes are moist       Pharynx: Oropharynx is clear  Eyes:      Extraocular Movements: Extraocular movements intact  Conjunctiva/sclera: Conjunctivae normal       Pupils: Pupils are equal, round, and reactive to light  Neck:      Comments: c-collar in place  Cardiovascular:      Rate and Rhythm: Normal rate and regular rhythm  Pulses: Normal pulses  Heart sounds: Normal heart sounds  No murmur heard  No friction rub  No gallop  Pulmonary:      Effort: Pulmonary effort is normal  No respiratory distress  Breath sounds: Normal breath sounds  No wheezing or rales  Comments: Right sided lower chest wall tenderness  Chest:      Chest wall: Tenderness present  Abdominal:      General: Abdomen is flat  There is no distension  Tenderness: There is no abdominal tenderness  There is no guarding or rebound  Comments: Right sided PCN in place  Musculoskeletal:         General: No tenderness  Normal range of motion  Cervical back: Neck supple  No tenderness  Right lower leg: No edema  Left lower leg: No edema  Skin:     General: Skin is warm and dry  Capillary Refill: Capillary refill takes less than 2 seconds     Neurological:      General: No focal deficit present  Mental Status: He is alert and oriented to person, place, and time  Cranial Nerves: No cranial nerve deficit  Sensory: No sensory deficit  Motor: No weakness  Invasive Devices  Report    None               Lab Results:   BMP/CMP:   Lab Results   Component Value Date    SODIUM 135 10/08/2022    K 4 4 10/08/2022     10/08/2022    CO2 24 10/08/2022    BUN 45 (H) 10/08/2022    CREATININE 2 70 (H) 10/08/2022    CALCIUM 8 3 10/08/2022    AST 12 10/08/2022    ALT 17 10/08/2022    ALKPHOS 76 10/08/2022    EGFR 22 10/08/2022    and CBC:   Lab Results   Component Value Date    WBC 10 44 (H) 10/08/2022    HGB 8 9 (L) 10/08/2022    HCT 28 3 (L) 10/08/2022    MCV 77 (L) 10/08/2022     (L) 10/08/2022    MCH 24 3 (L) 10/08/2022    MCHC 31 4 10/08/2022    RDW 15 8 (H) 10/08/2022    MPV 11 0 10/08/2022    NRBC 0 10/08/2022       Imaging Results: I have personally reviewed pertinent films in PACS  Chest Xray(s): negative for acute findings   FAST exam(s): negative for acute findings   CT Scan(s): positive for acute findings: no acute traumatic injuries, patient has new lung nodule   Additional Xray(s): N/A     Other Studies: n/a    Code Status: No Order  Advance Directive and Living Will:      Power of :    POLST:    I have spent 35 minutes with Patient and family today in which greater than 50% of this time was spent in counseling/coordination of care regarding Diagnostic results, Prognosis, Risks and benefits of tx options, Intructions for management and Impressions

## 2022-10-08 NOTE — PROCEDURES
POC FAST US    Date/Time: 10/8/2022 3:33 PM  Performed by: Alejandra Willis MD  Authorized by: Alejandra Willis MD     Patient location:  ED  Other Assisting Provider: No    Procedure details:     Exam Type:  Diagnostic    Indications: blunt abdominal trauma      Assess for:  Intra-abdominal fluid    Technique: FAST      Views obtained:  Heart - Pericardial sac, RUQ - Rodriguez's Pouch, LUQ - Splenorenal space and Suprapubic - Pouch of Dagoberto    Image quality: limited diagnostic      Image availability:  Images available in PACS and video obtained  FAST Findings:     RUQ (Hepatorenal) free fluid: absent      LUQ (Splenorenal) free fluid: absent      Suprapubic free fluid: absent      Cardiac wall motion: identified      Pericardial effusion: absent    Interpretation:     Impressions: negative

## 2022-10-08 NOTE — ASSESSMENT & PLAN NOTE
On both rrestoril and ambien verified via pdmp  Will decrease ambien to 5mg and restoril to 15mg and make PRN instead  Not sure if could be contributing to falls

## 2022-10-08 NOTE — ASSESSMENT & PLAN NOTE
No results found for: HGBA1C    No results for input(s): POCGLU in the last 72 hours      Blood Sugar Average: Last 72 hrs:    HgA1c of 6 8  Cont levemir  Place on ISS

## 2022-10-08 NOTE — ASSESSMENT & PLAN NOTE
W/ RT PCN  Per his significant other she states it was leaking around the tube and pt had come into IR for tube check yesterday  She states she has noticed urine drainage had contained mucus  Presenting UA with pyuria   F/u urine cx  Will empirically txt with CTX  Mild leukocytosis   Will check procal  Will consult urology

## 2022-10-09 LAB
ANION GAP SERPL CALCULATED.3IONS-SCNC: 8 MMOL/L (ref 4–13)
BACTERIA BLD CULT: NORMAL
BACTERIA BLD CULT: NORMAL
BUN SERPL-MCNC: 40 MG/DL (ref 5–25)
CALCIUM SERPL-MCNC: 8.6 MG/DL (ref 8.3–10.1)
CHLORIDE SERPL-SCNC: 105 MMOL/L (ref 96–108)
CO2 SERPL-SCNC: 23 MMOL/L (ref 21–32)
CREAT SERPL-MCNC: 2.73 MG/DL (ref 0.6–1.3)
ERYTHROCYTE [DISTWIDTH] IN BLOOD BY AUTOMATED COUNT: 16.2 % (ref 11.6–15.1)
GFR SERPL CREATININE-BSD FRML MDRD: 21 ML/MIN/1.73SQ M
GLUCOSE SERPL-MCNC: 140 MG/DL (ref 65–140)
GLUCOSE SERPL-MCNC: 195 MG/DL (ref 65–140)
GLUCOSE SERPL-MCNC: 199 MG/DL (ref 65–140)
GLUCOSE SERPL-MCNC: 98 MG/DL (ref 65–140)
HCT VFR BLD AUTO: 29.7 % (ref 36.5–49.3)
HGB BLD-MCNC: 9.1 G/DL (ref 12–17)
MCH RBC QN AUTO: 23.9 PG (ref 26.8–34.3)
MCHC RBC AUTO-ENTMCNC: 30.6 G/DL (ref 31.4–37.4)
MCV RBC AUTO: 78 FL (ref 82–98)
PLATELET # BLD AUTO: 128 THOUSANDS/UL (ref 149–390)
PMV BLD AUTO: 11.7 FL (ref 8.9–12.7)
POTASSIUM SERPL-SCNC: 4.6 MMOL/L (ref 3.5–5.3)
RBC # BLD AUTO: 3.8 MILLION/UL (ref 3.88–5.62)
SODIUM SERPL-SCNC: 136 MMOL/L (ref 135–147)
WBC # BLD AUTO: 10.74 THOUSAND/UL (ref 4.31–10.16)

## 2022-10-09 PROCEDURE — 82948 REAGENT STRIP/BLOOD GLUCOSE: CPT

## 2022-10-09 PROCEDURE — 85027 COMPLETE CBC AUTOMATED: CPT | Performed by: INTERNAL MEDICINE

## 2022-10-09 PROCEDURE — 80048 BASIC METABOLIC PNL TOTAL CA: CPT | Performed by: INTERNAL MEDICINE

## 2022-10-09 PROCEDURE — 97163 PT EVAL HIGH COMPLEX 45 MIN: CPT

## 2022-10-09 PROCEDURE — 80197 ASSAY OF TACROLIMUS: CPT | Performed by: FAMILY MEDICINE

## 2022-10-09 PROCEDURE — 99232 SBSQ HOSP IP/OBS MODERATE 35: CPT | Performed by: FAMILY MEDICINE

## 2022-10-09 PROCEDURE — 87086 URINE CULTURE/COLONY COUNT: CPT | Performed by: INTERNAL MEDICINE

## 2022-10-09 PROCEDURE — NC001 PR NO CHARGE: Performed by: SURGERY

## 2022-10-09 RX ORDER — SODIUM CHLORIDE 9 MG/ML
100 INJECTION, SOLUTION INTRAVENOUS CONTINUOUS
Status: DISCONTINUED | OUTPATIENT
Start: 2022-10-09 | End: 2022-10-12

## 2022-10-09 RX ADMIN — TACROLIMUS 1 MG: 1 CAPSULE ORAL at 21:17

## 2022-10-09 RX ADMIN — ZOLPIDEM TARTRATE 5 MG: 5 TABLET, COATED ORAL at 21:16

## 2022-10-09 RX ADMIN — CEFTRIAXONE SODIUM 1000 MG: 10 INJECTION, POWDER, FOR SOLUTION INTRAVENOUS at 19:42

## 2022-10-09 RX ADMIN — PREGABALIN 50 MG: 50 CAPSULE ORAL at 17:20

## 2022-10-09 RX ADMIN — TAMSULOSIN HYDROCHLORIDE 0.4 MG: 0.4 CAPSULE ORAL at 17:20

## 2022-10-09 RX ADMIN — PREGABALIN 50 MG: 50 CAPSULE ORAL at 09:23

## 2022-10-09 RX ADMIN — HEPARIN SODIUM 5000 UNITS: 5000 INJECTION INTRAVENOUS; SUBCUTANEOUS at 05:04

## 2022-10-09 RX ADMIN — INSULIN DETEMIR 15 UNITS: 100 INJECTION, SOLUTION SUBCUTANEOUS at 21:24

## 2022-10-09 RX ADMIN — HEPARIN SODIUM 5000 UNITS: 5000 INJECTION INTRAVENOUS; SUBCUTANEOUS at 13:41

## 2022-10-09 RX ADMIN — HEPARIN SODIUM 5000 UNITS: 5000 INJECTION INTRAVENOUS; SUBCUTANEOUS at 21:13

## 2022-10-09 RX ADMIN — INSULIN LISPRO 1 UNITS: 100 INJECTION, SOLUTION INTRAVENOUS; SUBCUTANEOUS at 21:19

## 2022-10-09 RX ADMIN — CLOPIDOGREL BISULFATE 75 MG: 75 TABLET ORAL at 09:23

## 2022-10-09 RX ADMIN — PREGABALIN 50 MG: 50 CAPSULE ORAL at 21:14

## 2022-10-09 RX ADMIN — INSULIN LISPRO 2 UNITS: 100 INJECTION, SOLUTION INTRAVENOUS; SUBCUTANEOUS at 13:41

## 2022-10-09 RX ADMIN — TACROLIMUS 1 MG: 1 CAPSULE ORAL at 09:23

## 2022-10-09 RX ADMIN — ACETAMINOPHEN 650 MG: 325 TABLET, FILM COATED ORAL at 21:14

## 2022-10-09 RX ADMIN — SODIUM CHLORIDE 75 ML/HR: 0.9 INJECTION, SOLUTION INTRAVENOUS at 15:38

## 2022-10-09 RX ADMIN — ATORVASTATIN CALCIUM 80 MG: 80 TABLET, FILM COATED ORAL at 17:20

## 2022-10-09 NOTE — ASSESSMENT & PLAN NOTE
Hx of multiple falls for which he has been admitted multiple times  Had been attributed to orthostatic hypotension  And w/ FTT  Pt unable to care for himself  Goal was to place in SNF on last admission however pt ends up signing out AMA  Unclear of medical decision capacity thus will obtain neuropsych eval  S/p mckeon ct   Resulting facial laceration s/p sutures  Cont supportive care  Fall precaution  PT/OT  Check orthostatics-noted to be positive for orthostatic    Will start on IV fluids and monitor

## 2022-10-09 NOTE — QUICK NOTE
Discussed with Urology who states that they defer all nephrostomy tube complications to IR  Appears that patient had IR R PCN tube check 10/07/22  Will remove urology consult as requested

## 2022-10-09 NOTE — ASSESSMENT & PLAN NOTE
Underlying cognitive impairment as confirmed per his significant other   Pt w/ prior hx of CVA  Worsening question if due to metabolic encephalopathy due to UTI  Will obtain neuropsych eval  Pt's significant other has POA-will bring the document

## 2022-10-09 NOTE — PROGRESS NOTES
[address] [facility]  Progress Note - Renetta Xiong 1950, 74 y o  male MRN: [id number]  Unit/Bed#: [facility] 926-01 Encounter: [id number]  Primary Care Provider: Oanh Cardoso DO   Date and time admitted to hospital: 10/8/2022  2:12 PM    Facial laceration  Assessment & Plan  - repaired with sutures on 10/8  - will need suture removal in 5-7 days    * Fall  Assessment & Plan  - patient has had multiple falls in the past few weeks, 2-3 falls over the past 24 hours  - CT head, c-spine, c/a/p negative for acute traumatic injuries  - will admit to medicine for further workup and PT/OT  - tertiary exam on 10/9:         TERTIARY TRAUMA SURVEY NOTE    Prophylaxis: Heparin    Disposition: continue management per primary team, trauma will sign off    Code status:  Level 1 - Full Code    Consultants: none      SUBJECTIVE:     Transfer from: n/a  Mechanism of Injury:Fall    Chief Complaint: Denies any complaints this morning    HPI/Last 24 hour events:   Renetta Xiong is a 74 y o  male with past medical history of diabetes, bladder cancer, right sided nephrostomy tube, CKD and cirrhosis s/p liver transplant in 2003 who presents as level B trauma alert after a fall on Plavix  Patient apparently fell out of bed 30 minutes prior to arrival  He hit his head  He has a laceration above the right eye brow  He states he has pain in his head  He does not remember how he fell  He states he has 2-3 falls over the past 24 hours  Patient was recently seen in the ER 2 days ago and was admitted two weeks ago for similar symptoms but left AMA both times  His significant other states he has been more confused over the past few days and has not been eating or drinking  She does not think he can take care of himself at home  Patient denies any complaints this morning  Denies headaches, neck pain, back pain, chest pain, shortness of breath, abdominal pain or pain in his extremities      Active medications: Current Facility-Administered Medications:   •  acetaminophen (TYLENOL) tablet 650 mg, 650 mg, Oral, Q6H PRN  •  aluminum-magnesium hydroxide-simethicone (MYLANTA) oral suspension 30 mL, 30 mL, Oral, Q6H PRN  •  atorvastatin (LIPITOR) tablet 80 mg, 80 mg, Oral, After Dinner, 80 mg at 10/08/22 2053  •  ceftriaxone (ROCEPHIN) 1 g/50 mL in dextrose IVPB, 1,000 mg, Intravenous, Q24H, 1,000 mg at 10/08/22 2200  •  clopidogrel (PLAVIX) tablet 75 mg, 75 mg, Oral, Daily  •  heparin (porcine) subcutaneous injection 5,000 Units, 5,000 Units, Subcutaneous, Q8H [facility], 5,000 Units at 10/09/22 0504  •  insulin detemir (LEVEMIR) subcutaneous injection 15 Units, 15 Units, Subcutaneous, HS  •  insulin lispro (HumaLOG) 100 units/mL subcutaneous injection 1-5 Units, 1-5 Units, Subcutaneous, HS  •  insulin lispro (HumaLOG) 100 units/mL subcutaneous injection 1-6 Units, 1-6 Units, Subcutaneous, TID AC **AND** Fingerstick Glucose (POCT), , , TID AC  •  loperamide (IMODIUM) capsule 2 mg, 2 mg, Oral, BID PRN  •  ondansetron (ZOFRAN) injection 4 mg, 4 mg, Intravenous, Q6H PRN  •  pregabalin (LYRICA) capsule 50 mg, 50 mg, Oral, TID, 50 mg at 10/08/22 2053  •  tacrolimus (PROGRAF) capsule 1 mg, 1 mg, Oral, Q12H JUNIOR, 1 mg at 10/08/22 2330  •  tamsulosin (FLOMAX) capsule 0 4 mg, 0 4 mg, Oral, After Dinner, 0 4 mg at 10/08/22 2055  •  zolpidem (AMBIEN) tablet 5 mg, 5 mg, Oral, HS PRN      OBJECTIVE:     Vitals:   Vitals:    10/09/22 0459   BP: 120/64   Pulse: 99   Resp:    Temp: 98 8 °F (37 1 °C)   SpO2: 98%       Physical Exam:   GENERAL APPEARANCE: no acute distress  NEURO: awake and alert, no focal deficits  HEENT: laceration above the right eye brow with sutures  CV: regular rate and rhythm  LUNGS: clear to auscultation bilaterally  GI: abd soft, nontender  : deferred  MSK: no edema  SKIN: warm and dry      1  Before the illness or injury that brought you to the Emergency, did you need someone to help you on a regular basis? 1=Yes   2  Since the illness or injury that brought you to the Emergency, have you needed more help than usual to take care of yourself? 1=Yes   3  Have you been hospitalized for one or more nights during the past 6 months (excluding a stay in the Emergency Department)? 1=Yes   4  In general, do you see well? 0=Yes   5  In general, do you have serious problems with your memory? 0=No   6  Do you take more than three different medications everyday? 1=Yes   TOTAL   4     Did you order a geriatric consult if the score was 2 or greater?: deferred to primary team, patient admitted to medicine                I/O:   I/O       10/07 0701  10/08 0700 10/08 0701  10/09 0700    Urine (mL/kg/hr)  400    Stool  0    Total Output  400    Net  -400          Unmeasured Stool Occurrence  1 x          Invasive Devices: Invasive Devices  Report    Central Venous Catheter Line  Duration           CVC Central Lines 10/08/22 Single 1 day          Drain  Duration           Percutaneous Nephroureteral Tube (PCNU) Right 1 -- days                  Imaging:   TRAUMA - CT head wo contrast    Result Date: 10/8/2022  Impression: No acute intracranial abnormality  Stable chronic microangiopathic changes within the brain  No change since recent prior  Workstation performed: [id number]     TRAUMA - CT spine cervical wo contrast    Result Date: 10/8/2022  Impression: No cervical spine fracture or traumatic malalignment  Workstation performed: [id number]     TRAUMA - CT chest abdomen pelvis w contrast    Result Date: 10/8/2022  Impression: 1  No evidence of traumatic injury in the chest, abdomen or pelvis  2   Right upper lobe solid nodule measuring 9 mm, new since 2021, suspicious for metastatic disease  3   Right percutaneous nephroureterostomy in place with mild right hydronephrosis and a focus of air in the right renal collecting system, similar to the prior study    Delayed right nephrogram  4   Stable diffuse urinary bladder wall thickening with a focus of air in the urinary bladder, likely related to indwelling stent  I personally discussed this study with Nleia Granados on 10/8/2022 at 3:32 PM   Workstation performed: [id number]     XR Trauma multiple (SLB/SLRA trauma bay ONLY)    Result Date: 10/8/2022  Impression: No acute cardiopulmonary disease within limitations of supine imaging   Workstation performed: [id number]       Labs:   CBC:   Lab Results   Component Value Date    WBC 10 44 (H) 10/08/2022    HGB 8 9 (L) 10/08/2022    HCT 28 3 (L) 10/08/2022    MCV 77 (L) 10/08/2022     (L) 10/08/2022    MCH 24 3 (L) 10/08/2022    MCHC 31 4 10/08/2022    RDW 15 8 (H) 10/08/2022    MPV 11 0 10/08/2022    NRBC 0 10/08/2022     CMP:   Lab Results   Component Value Date     10/08/2022    CO2 24 10/08/2022    BUN 45 (H) 10/08/2022    CREATININE 2 70 (H) 10/08/2022    CALCIUM 8 3 10/08/2022    AST 12 10/08/2022    ALT 17 10/08/2022    ALKPHOS 76 10/08/2022    EGFR 22 10/08/2022

## 2022-10-09 NOTE — ASSESSMENT & PLAN NOTE
RT upper lobe 9mm, new since 2021; suspicious for metastatic disease  Will need arrangement for PET scan as outpt  Pt to f/u with oncologist - Dr Alicea Grim results and need for follow up with his significant other

## 2022-10-09 NOTE — CASE MANAGEMENT
Case Management Assessment & Discharge Planning Note    Patient name Hector Moya  Location [facility]-95 MRN [id number]  : 1950 Date 10/9/2022       Current Admission Date: 10/8/2022  Current Admission Diagnosis:Fall   Patient Active Problem List    Diagnosis Date Noted   • Fall 10/08/2022   • Facial laceration 10/08/2022   • Chronic [facility]CKD[facility] IV ([facility] ) 10/08/2022   • History of bladder cancer 10/08/2022   • History of liver transplant ([place] [address] ) 10/08/2022   • DM II (diabetes mellitus, type II), controlled ([place] [address] ) 10/08/2022   • Anemia 10/08/2022   • Thrombocytopenia ([place] [address] ) 10/08/2022   • Pulmonary nodule 10/08/2022   • Insomnia 10/08/2022   • Acute encephalopathy 10/08/2022   • History of hydronephrosis 10/08/2022      LOS (days): 1  Geometric Mean LOS (GMLOS) (days):   Days to GMLOS:     OBJECTIVE:    Risk of Unplanned Readmission Score: 14 41         Current admission status: Inpatient       Preferred Pharmacy:   PATIENT/FAMILY REPORTS NO PREFERRED PHARMACY  No address on file      Primary Care Provider: Estefani Lee DO    Primary Insurance: Nazanin SILVERIO  Secondary Insurance:     ASSESSMENT:  Kathia Hoover Proxies    There are no active Health Care Proxies on file  Patient Information  Admitted from[de-identified] Home  Mental Status: Alert  During Assessment patient was accompanied by: Spouse  Assessment information provided by[de-identified] Patient, Spouse  Primary Caregiver: Self  Support Systems: Self, Spouse/significant other  [place] of Residence: [employer] do you live in?: [address] [place] [address] entry access options   Select all that apply : Stairs  Number of steps to enter home : 3  Type of Current Residence: 2 story home  Living Arrangements: Lives w/ Spouse/significant other    Activities of Daily Living Prior to Admission  Functional Status: Independent  Completes ADLs independently?: Yes  Ambulates independently?: Yes  Does patient use assisted devices?: Yes  Does patient currently own DME?: No  Does patient have a history of Outpatient Therapy (PT/OT)?: No  Does the patient have a history of Short-Term Rehab?: Yes  Does patient have a history of HHC?: Yes  Does patient currently have [facility]?: No         Patient Information Continued  Income Source: Pension/MCC         Means of Transportation  Means of Transport to Appts[de-identified] Other (Comment)        DISCHARGE DETAILS:    Discharge planning discussed with[de-identified] Cm spoke to pt's SO introduced self, role and discharged process  Pt lives with SO in 2 story house, 3 steps to enter  Pt has hx of inpt rehab and [facility] services  Pt to discharged home with [facility] services when medically cleared and SO to transport upon discharged  PT recommending home with [facility] services  recommendation reviewed with SO with freedom of choice and she's agreeable to [facility] services and requested [facility]  referral placed  Cm will continue to follow and assist with discharged planning needs    Freedom of Choice: Yes        Were Treatment Team discharge recommendations reviewed with patient/caregiver?: Yes  Did patient/caregiver verbalize understanding of patient care needs?: Yes  Were patient/caregiver advised of the risks associated with not following Treatment Team discharge recommendations?: Yes    Contacts  Relationship to Patient[de-identified] Family  Contact Method: Phone  Reason/Outcome: Discharge [address]         Is the patient interested in [facility] at discharge?: Yes  [address] requested[de-identified] [address] Name[de-identified] [facility]         Other Referral/Resources/Interventions Provided:  Interventions: [facility]         Treatment Team Recommendation: Home, Home with [address] [employer]

## 2022-10-09 NOTE — PROGRESS NOTES
Began attempting to ambulate patient to BR  When at the doorway to the bathroom, patient stated, "I am going to fall "  Patient began to become weak and started to have difficulty maintaining eye contact  Assisted patient back to bed quickly  Vital signs back in bed are WNL  Orthostatic BPs to be performed and Dr Mohan Sewell made aware

## 2022-10-09 NOTE — PHYSICAL THERAPY NOTE
Physical Therapy evaluation note     Patient Name: Yasmin Green    Today's Date: 10/9/2022     Problem List  Principal Problem:    Fall  Active Problems:    Facial laceration    Chronic kidney disease (CKD), stage IV (severe) (HCC)    History of bladder cancer    History of liver transplant ([facility] [address] )    DM II (diabetes mellitus, type II), controlled ([facility] [address] )    Anemia    Thrombocytopenia (HCC)    Pulmonary nodule    Insomnia    Acute encephalopathy    History of hydronephrosis       Past Medical History  No past medical history on file  Past Surgical History  No past surgical history on file  10/09/22 1134   PT Last Visit   PT Visit Date 10/09/22   Note Type   Note type Evaluation   Pain Assessment   Pain Assessment Tool FLACC   Pain Rating: FLACC (Rest) - Face 0   Pain Rating: FLACC (Rest) - Legs 0   Pain Rating: FLACC (Rest) - Activity 0   Pain Rating: FLACC (Rest) - Cry 0   Pain Rating: FLACC (Rest) - Consolability 0   Score: FLACC (Rest) 0   Pain Rating: FLACC (Activity) - Face 1   Pain Rating: FLACC (Activity) - Legs 1   Pain Rating: FLACC (Activity) - Activity 0   Pain Rating: FLACC (Activity) - Cry 0   Pain Rating: FLACC (Activity) - Consolability 0   Score: FLACC (Activity) 2   Restrictions/Precautions   Weight Bearing Precautions Per Order No   Other Precautions (S)  Fall Risk;Pain  (dizziness with mobility in AM-per RN BP supine: 90/62mmHg, BP sitting 71/47 mmHg, BP standing 69/45 mmHg  BP post activity 109/60mmHg)   Home Living   Type of [address] [place] [address] Two level   Additional Comments Pt lives with his wife in a [facility] [address] with 4 DAWN  Pt's wife is home at all times  Pt was I for ADL's and IADL's prior  Pt does not work  Pt drives     Prior Function   Level of [place] Independent with ADLs and functional mobility   Lives With Spouse   Receives Help From Family   ADL Assistance Independent   IADLs Independent   Vocational Unemployed   Comments +drives   General   Family/Caregiver Present No   Cognition   Overall Cognitive Status WFL   Arousal/Participation Alert   Orientation Level Oriented to person;Oriented to time;Oriented to situation;Disoriented to time   RLE Assessment   RLE Assessment X   Strength RLE   R Hip Flexion 3+/5   R Knee Extension 3+/5   R Ankle Dorsiflexion 3+/5   LLE Assessment   LLE Assessment X   Strength LLE   L Hip Flexion 3+/5   L Knee Extension 3+/5   L Ankle Dorsiflexion 3+/5   Coordination   Sensation WFL   Light Touch   RLE Light Touch Grossly intact   LLE Light Touch Grossly intact   Bed Mobility   Supine to Sit 5  Supervision   Additional items HOB elevated; Increased time required   Sit to Supine 5  Supervision   Additional Comments sitting EOB S level   Transfers   Sit to Stand 5  Supervision  (CGA)   Stand to Sit 5  Supervision  (CGA)   Ambulation/Elevation   Gait pattern Excessively slow; Step to   Gait Assistance 5  Supervision  (CGA)   Assistive Device None   Distance 2ft to [facility]   Balance   Static Sitting Fair   Dynamic Sitting Fair   Static Standing Fair -   Dynamic Standing Fair -   Ambulatory Poor +   Endurance Deficit   Endurance Deficit Yes   Activity Tolerance   Activity Tolerance   (dizziness)   Nurse Made Aware nurse approved therapy session   Assessment   Prognosis Good   Problem List Decreased mobility; Impaired balance  (dizziness with mobility)   Assessment Pt is a 73 yo male admitted to [place] [address] on 10/8/2022 s/p fall  DX: facial laceration, fall  PMH: DM, bladder CA, R sided nephrostomy tube, CKD and cirrhosis  Two patient identifiers were used to confirm  Pt lives with his wife in a [address] with his wife  Pt was I for ADL's and mobility prior  Pt does not work but drives  Pt;s wife will be home to assist at all times  Pt's impairments include reduced mobility, poor endurance, high risk of falling   These impairments limit the ability of the patient to perform mobility without increased assistance, return to OF and participate in everyday life activities  Pt would benefit from continued skilled therapy while in the hospital to improve overall mobility and work towards a safe d/c  Recommend discharge to home with home PT pending reduced dizziness with mobility  At the end of the session the patient was left in supine position with call bell and phone within reach  The patient's AM-[facility] Basic Mobility Inpatient Short Form Raw Score is 16  A Raw score of less than or equal to 16 suggests the patient may benefit from discharge to home  Please also refer to the recommendation of the Physical Therapist for safe discharge planning  Anticipate the patient will progress to home with home PT pending medical management  Pt had a ten point drop in diastolic BP with psotional changes  Pt is positive for orthostatic hypotension  RN present and made aware   Barriers to Discharge Inaccessible home environment   Goals   STG Expiration Date 10/23/22   Short Term Goal #1 STG 1: Pt will perform transfers at a MI/I level to return to baseline of function  STG 2: Pt will ambulate 300ft with RW/least restrictive device at a MI/I level to reduce the level of assistance needed upon d/c home  STG 3: Pt will negotiate 12 steps with HR at a I level to ensure safety with activity when able to ambulate 50ft at a min A level  STG 4: Pt will perform bed mobility at a I to safety return to PLOF  PT Treatment Day 0   Plan   Treatment/Interventions Functional transfer training;LE strengthening/ROM; Therapeutic exercise; Endurance training;Elevations;Gait training;Bed mobility; Equipment eval/education;OT   PT Frequency 2-3x/wk   Recommendation   PT Discharge Recommendation Home with home health rehabilitation  (pending progress and medical stability)   AM-PAC Basic Mobility Inpatient   Turning in Bed Without Bedrails 3   Lying on Back to Sitting on Edge of Flat Bed 3   Moving Bed to Chair 3   Standing Up From Chair 3   Walk in Room 2   Climb 3-5 Stairs 2 Basic Mobility Inpatient Raw Score 16   Basic Mobility Standardized Score 38 32   Highest Level Of Mobility   -[facility] Goal 5: Stand one or more mins   -HLM Achieved 4: Move to chair/commode   Keri Keenan, PT, DPT

## 2022-10-09 NOTE — ASSESSMENT & PLAN NOTE
Lab Results   Component Value Date    EGFR 21 10/09/2022    EGFR 22 10/08/2022    CREATININE 2 73 (H) 10/09/2022    CREATININE 2 70 (H) 10/08/2022   Cr w/in baseline range  Hx of hydronephrosis w/ RT PCN in place; s/p recent tube check on 10/7

## 2022-10-09 NOTE — PLAN OF CARE
Problem: PHYSICAL THERAPY ADULT  Goal: Performs mobility at highest level of function for planned discharge setting  See evaluation for individualized goals  Description: Treatment/Interventions: Functional transfer training, LE strengthening/ROM, Therapeutic exercise, Endurance training, Elevations, Gait training, Bed mobility, Equipment eval/education, OT          See flowsheet documentation for full assessment, interventions and recommendations  Note: Prognosis: Good  Problem List: Decreased mobility, Impaired balance (dizziness with mobility)  Assessment: Pt is a 73 yo male admitted to [place] [address] on 10/8/2022 s/p fall  DX: facial laceration, fall  PMH: DM, bladder CA, R sided nephrostomy tube, CKD and cirrhosis  Two patient identifiers were used to confirm  Pt lives with his wife in a [facility] with his wife  Pt was I for ADL's and mobility prior  Pt does not work but drives  Pt;s wife will be home to assist at all times  Pt's impairments include reduced mobility, poor endurance, high risk of falling  These impairments limit the ability of the patient to perform mobility without increased assistance, return to PLOF and participate in everyday life activities  Pt would benefit from continued skilled therapy while in the hospital to improve overall mobility and work towards a safe d/c  Recommend discharge to home with home PT pending reduced dizziness with mobility  At the end of the session the patient was left in supine position with call bell and phone within reach  The patient's AM-PAC Basic Mobility Inpatient Short Form Raw Score is 16  A Raw score of less than or equal to 16 suggests the patient may benefit from discharge to home  Please also refer to the recommendation of the Physical Therapist for safe discharge planning  Anticipate the patient will progress to home with home PT pending medical management  Pt had a ten point drop in diastolic BP with psotional changes   Pt is positive for orthostatic hypotension  RN present and made aware  Barriers to Discharge: Inaccessible home environment     PT Discharge Recommendation: Home with home health rehabilitation (pending progress and medical stability)    See flowsheet documentation for full assessment

## 2022-10-09 NOTE — PROGRESS NOTES
[facility] [facility]  Progress Note - America Snell 1950, 74 y o  male MRN: [id number]  [facility]: [id number]  Primary Care Provider: Rashi Gutiérrez DO   Date and time admitted to hospital: 10/8/2022  2:12 PM    History of hydronephrosis  Assessment & Plan  W/ RT PCN  Per his significant other she states it was leaking around the tube and pt had come into IR for tube check yesterday  She states she has noticed urine drainage had contained mucus  Presenting UA with pyuria   F/u urine cx  Will empirically txt with CTX  Mild leukocytosis  Will check procal  Urology recommended IR consult for tube check    Acute encephalopathy  Assessment & Plan  Underlying cognitive impairment as confirmed per his significant other   Pt w/ prior hx of CVA  Worsening question if due to metabolic encephalopathy due to UTI  Will obtain neuropsych eval  Pt's significant other has POA-will bring the document    Insomnia  Assessment & Plan  On both rrestoril and ambien verified via pdmp  Will decrease ambien to 5mg and restoril to 15mg and make PRN instead  Not sure if could be contributing to falls    Pulmonary nodule  Assessment & Plan  RT upper lobe 9mm, new since 2021; suspicious for metastatic disease  Will need arrangement for PET scan as outpt  Pt to f/u with oncologist - Dr Merlin Lowes results and need for follow up with his significant other    Thrombocytopenia ([facility] [address] )  Assessment & Plan  Chronic    Anemia  Assessment & Plan  Secondary to anemia of chronic disese  No sign of bleeding    DM II (diabetes mellitus, type II), controlled [facility])  Assessment & Plan  No results found for: HGBA1C    Recent Labs     10/08/22  1827 10/08/22  2051 10/09/22  1121 10/09/22  1705   POCGLU 103 97 195* 140       Blood Sugar Average: Last 72 hrs:  (P) 133 75  HgA1c of 6 8  Cont levemir  Place on ISS    History of liver transplant ([facility] [address] )  Assessment & Plan  On prograf  Check level    History of bladder cancer  Assessment & Plan  S/p chemo/RT  Pt follows with oncology and palliative care    Chronic kidney disease (CKD), stage IV (severe) ([facility] [address] )  Assessment & Plan  Lab Results   Component Value Date    EGFR 21 10/09/2022    EGFR 22 10/08/2022    CREATININE 2 73 (H) 10/09/2022    CREATININE 2 70 (H) 10/08/2022   Cr w/in baseline range  Hx of hydronephrosis w/ RT PCN in place; s/p recent tube check on 10/7    Facial laceration  Assessment & Plan  S/p suturing      * Fall  Assessment & Plan  Hx of multiple falls for which he has been admitted multiple times  Had been attributed to orthostatic hypotension  And w/ FTT  Pt unable to care for himself  Goal was to place in SNF on last admission however pt ends up signing out AMA  Unclear of medical decision capacity thus will obtain neuropsych eval  S/p mckeon ct   Resulting facial laceration s/p sutures  Cont supportive care  Fall precaution  PT/OT  Check orthostatics-noted to be positive for orthostatic  Will start on IV fluids and monitor        VTE Pharmacologic Prophylaxis:   Moderate Risk (Score 3-4) - Pharmacological DVT Prophylaxis Ordered: heparin  Patient Centered Rounds: I performed bedside rounds with nursing staff today  Discussions with Specialists or Other Care Team Provider:     Education and Discussions with Family / Patient: Updated  (wife) at bedside  Time Spent for Care: 30 minutes  More than 50% of total time spent on counseling and coordination of care as described above  Current Length of Stay: 1 day(s)  Current Patient Status: Inpatient   Certification Statement: The patient will continue to require additional inpatient hospital stay due to Orthostatic hypotension  Discharge Plan: Anticipate discharge in 48-72 hrs to discharge location to be determined pending rehab evaluations      Code Status: Level 1 - Full Code    Subjective:   Patient seen and examined patient had a fall and came back to the hospital   Patient was recently in the hospital and signed against medical advise  Denies any abdominal pain    Objective:     Vitals:   Temp (24hrs), Av 7 °F (37 1 °C), Min:98 4 °F (36 9 °C), Max:99 1 °F (37 3 °C)    Temp:  [98 4 °F (36 9 °C)-99 1 °F (37 3 °C)] 99 1 °F (37 3 °C)  HR:  [] 89  Resp:  [14-20] 16  BP: ()/(45-72) 114/61  SpO2:  [97 %-100 %] 98 %  Body mass index is 23 43 kg/m²  Input and Output Summary (last 24 hours): Intake/Output Summary (Last 24 hours) at 10/9/2022 1905  Last data filed at 10/9/2022 0501  Gross per 24 hour   Intake --   Output 400 ml   Net -400 ml       Physical Exam:   Physical Exam  Constitutional:       General: He is not in acute distress  Appearance: He is not ill-appearing  HENT:      Head: Normocephalic  Comments: Sutures on right eyebrow     Nose: Nose normal    Eyes:      General: No scleral icterus  Cardiovascular:      Rate and Rhythm: Normal rate  Pulmonary:      Comments: Decreased breath sounds bilateral  Abdominal:      General: Abdomen is flat  Comments: Left-sided nephrostomy  Not connected to bag   Musculoskeletal:         General: Normal range of motion  Skin:     General: Skin is warm  Neurological:      Mental Status: He is alert  Cranial Nerves: No cranial nerve deficit           Additional Data:     Labs:  Results from last 7 days   Lab Units 10/09/22  0441 10/08/22  1509   WBC Thousand/uL 10 74* 10 44*   HEMOGLOBIN g/dL 9 1* 8 9*   HEMATOCRIT % 29 7* 28 3*   PLATELETS Thousands/uL 128* 140*   NEUTROS PCT %  --  81*   LYMPHS PCT %  --  6*   MONOS PCT %  --  9   EOS PCT %  --  3     Results from last 7 days   Lab Units 10/09/22  0441 10/08/22  1509   SODIUM mmol/L 136 135   POTASSIUM mmol/L 4 6 4 4   CHLORIDE mmol/L 105 106   CO2 mmol/L 23 24   BUN mg/dL 40* 45*   CREATININE mg/dL 2 73* 2 70*   ANION GAP mmol/L 8 5   CALCIUM mg/dL 8 6 8 3   ALBUMIN g/dL  --  2 9*   TOTAL BILIRUBIN mg/dL  --  0 26   ALK PHOS U/L  --  76   ALT U/L  --  17   AST U/L  --  12   GLUCOSE RANDOM mg/dL 98 170*     Results from last 7 days   Lab Units 10/08/22  1509   INR  1 14     Results from last 7 days   Lab Units 10/09/22  1705 10/09/22  1121 10/08/22  2051 10/08/22  1827   POC GLUCOSE mg/dl 140 195* 97 103         Results from last 7 days   Lab Units 10/08/22  1509   PROCALCITONIN ng/ml 0 15       Lines/Drains:  Invasive Devices  Report    Central Venous Catheter Line  Duration           Port A Cath Right Subclavian -- days          Drain  Duration           Percutaneous Nephroureteral Tube (PCNU) Right 1 -- days                Central Line:  Goal for removal: No longer needed  Will place order to discontinue               Imaging: Reviewed radiology reports from this admission including: abdominal/pelvic CT    Recent Cultures (last 7 days):         Last 24 Hours Medication List:   Current Facility-Administered Medications   Medication Dose Route Frequency Provider Last Rate   • acetaminophen  650 mg Oral Q6H PRN [facility], DO     • aluminum-magnesium hydroxide-simethicone  30 mL Oral Q6H PRN [facility], DO     • atorvastatin  80 mg Oral After Dinner [facility], DO     • cefTRIAXone  1,000 mg Intravenous Q24H [facility], DO 1,000 mg (10/08/22 2200)   • clopidogrel  75 mg Oral Daily [facility], DO     • heparin (porcine)  5,000 Units Subcutaneous Q8H [address] Ashlye Carreon, DO     • insulin detemir  15 Units Subcutaneous HS Ashley Carreon DO     • insulin lispro  1-5 Units Subcutaneous HS Ashley Carreon DO     • insulin lispro  1-6 Units Subcutaneous TID AC Ashley Carreon DO     • loperamide  2 mg Oral BID PRN [facility], DO     • ondansetron  4 mg Intravenous Q6H PRN [facility], DO     • pregabalin  50 mg Oral TID [facility], DO     • sodium chloride  75 mL/hr Intravenous Continuous Ailyn Griffiths MD 75 mL/hr (10/09/22 1538)   • tacrolimus  1 mg Oral Q12H [facility] Daiana Felipe DO     • tamsulosin  0 4 mg Oral After Buddy Snyder DO     • zolpidem  5 mg Oral HS PRN Daiana Felipe DO          Today, Patient Was Seen By: Alphonso Stafford    **Please Note: This note may have been constructed using a voice recognition system  **

## 2022-10-09 NOTE — ASSESSMENT & PLAN NOTE
No results found for: HGBA1C    Recent Labs     10/08/22  1827 10/08/22  2051 10/09/22  1121 10/09/22  1705   POCGLU 103 97 195* 140       Blood Sugar Average: Last 72 hrs:  (P) 133 75  HgA1c of 6 8  Cont levemir  Place on ISS

## 2022-10-09 NOTE — ASSESSMENT & PLAN NOTE
W/ RT PCN  Per his significant other she states it was leaking around the tube and pt had come into IR for tube check yesterday  She states she has noticed urine drainage had contained mucus  Presenting UA with pyuria   F/u urine cx  Will empirically txt with CTX  Mild leukocytosis   Will check procal  Urology recommended IR consult for tube check

## 2022-10-10 ENCOUNTER — HOSPITAL ENCOUNTER (OUTPATIENT)
Dept: INFUSION CENTER | Facility: HOSPITAL | Age: 72
Discharge: HOME | End: 2022-10-10

## 2022-10-10 LAB
ALBUMIN SERPL BCP-MCNC: 2.2 G/DL (ref 3.5–5)
ALP SERPL-CCNC: 74 U/L (ref 46–116)
ALT SERPL W P-5'-P-CCNC: 13 U/L (ref 12–78)
ANION GAP SERPL CALCULATED.3IONS-SCNC: 5 MMOL/L (ref 4–13)
AST SERPL W P-5'-P-CCNC: 14 U/L (ref 5–45)
BILIRUB SERPL-MCNC: 0.16 MG/DL (ref 0.2–1)
BUN SERPL-MCNC: 44 MG/DL (ref 5–25)
CALCIUM ALBUM COR SERPL-MCNC: 9.5 MG/DL (ref 8.3–10.1)
CALCIUM SERPL-MCNC: 8.1 MG/DL (ref 8.3–10.1)
CHLORIDE SERPL-SCNC: 110 MMOL/L (ref 96–108)
CO2 SERPL-SCNC: 22 MMOL/L (ref 21–32)
CREAT SERPL-MCNC: 3.32 MG/DL (ref 0.6–1.3)
ERYTHROCYTE [DISTWIDTH] IN BLOOD BY AUTOMATED COUNT: 15.6 % (ref 11.6–15.1)
GFR SERPL CREATININE-BSD FRML MDRD: 17 ML/MIN/1.73SQ M
GLUCOSE SERPL-MCNC: 104 MG/DL (ref 65–140)
GLUCOSE SERPL-MCNC: 112 MG/DL (ref 65–140)
GLUCOSE SERPL-MCNC: 176 MG/DL (ref 65–140)
GLUCOSE SERPL-MCNC: 203 MG/DL (ref 65–140)
GLUCOSE SERPL-MCNC: 224 MG/DL (ref 65–140)
HCT VFR BLD AUTO: 25.6 % (ref 36.5–49.3)
HGB BLD-MCNC: 7.7 G/DL (ref 12–17)
MCH RBC QN AUTO: 23.5 PG (ref 26.8–34.3)
MCHC RBC AUTO-ENTMCNC: 30.1 G/DL (ref 31.4–37.4)
MCV RBC AUTO: 78 FL (ref 82–98)
PLATELET # BLD AUTO: 115 THOUSANDS/UL (ref 149–390)
PMV BLD AUTO: 12.1 FL (ref 8.9–12.7)
POTASSIUM SERPL-SCNC: 4.2 MMOL/L (ref 3.5–5.3)
PROT SERPL-MCNC: 6.6 G/DL (ref 6.4–8.4)
RBC # BLD AUTO: 3.27 MILLION/UL (ref 3.88–5.62)
SODIUM SERPL-SCNC: 137 MMOL/L (ref 135–147)
TACROLIMUS BLD-MCNC: 1.7 NG/ML (ref 2–20)
WBC # BLD AUTO: 10.6 THOUSAND/UL (ref 4.31–10.16)

## 2022-10-10 PROCEDURE — 97129 THER IVNTJ 1ST 15 MIN: CPT

## 2022-10-10 PROCEDURE — 99232 SBSQ HOSP IP/OBS MODERATE 35: CPT | Performed by: FAMILY MEDICINE

## 2022-10-10 PROCEDURE — 82948 REAGENT STRIP/BLOOD GLUCOSE: CPT

## 2022-10-10 PROCEDURE — 97130 THER IVNTJ EA ADDL 15 MIN: CPT

## 2022-10-10 PROCEDURE — 85027 COMPLETE CBC AUTOMATED: CPT | Performed by: FAMILY MEDICINE

## 2022-10-10 PROCEDURE — 80053 COMPREHEN METABOLIC PANEL: CPT | Performed by: FAMILY MEDICINE

## 2022-10-10 PROCEDURE — 97166 OT EVAL MOD COMPLEX 45 MIN: CPT

## 2022-10-10 RX ORDER — ZOLPIDEM TARTRATE 5 MG/1
10 TABLET ORAL
Status: DISCONTINUED | OUTPATIENT
Start: 2022-10-10 | End: 2022-10-11

## 2022-10-10 RX ORDER — ZOLPIDEM TARTRATE 5 MG/1
5 TABLET ORAL ONCE
Status: COMPLETED | OUTPATIENT
Start: 2022-10-10 | End: 2022-10-10

## 2022-10-10 RX ORDER — SODIUM CHLORIDE 9 MG/ML
10 INJECTION INTRAVENOUS DAILY
Qty: 300 ML | Refills: 3 | Status: SHIPPED | OUTPATIENT
Start: 2022-10-10 | End: 2022-10-17

## 2022-10-10 RX ORDER — OXYCODONE HYDROCHLORIDE 5 MG/1
2.5 TABLET ORAL EVERY 4 HOURS PRN
Status: DISCONTINUED | OUTPATIENT
Start: 2022-10-10 | End: 2022-10-13 | Stop reason: HOSPADM

## 2022-10-10 RX ADMIN — TAMSULOSIN HYDROCHLORIDE 0.4 MG: 0.4 CAPSULE ORAL at 17:20

## 2022-10-10 RX ADMIN — PREGABALIN 50 MG: 50 CAPSULE ORAL at 21:08

## 2022-10-10 RX ADMIN — INSULIN LISPRO 2 UNITS: 100 INJECTION, SOLUTION INTRAVENOUS; SUBCUTANEOUS at 13:40

## 2022-10-10 RX ADMIN — TACROLIMUS 1 MG: 1 CAPSULE ORAL at 21:09

## 2022-10-10 RX ADMIN — PREGABALIN 50 MG: 50 CAPSULE ORAL at 09:03

## 2022-10-10 RX ADMIN — CEFTRIAXONE SODIUM 1000 MG: 10 INJECTION, POWDER, FOR SOLUTION INTRAVENOUS at 20:54

## 2022-10-10 RX ADMIN — HEPARIN SODIUM 5000 UNITS: 5000 INJECTION INTRAVENOUS; SUBCUTANEOUS at 17:20

## 2022-10-10 RX ADMIN — OXYCODONE HYDROCHLORIDE 2.5 MG: 5 TABLET ORAL at 10:59

## 2022-10-10 RX ADMIN — PREGABALIN 50 MG: 50 CAPSULE ORAL at 17:20

## 2022-10-10 RX ADMIN — INSULIN DETEMIR 15 UNITS: 100 INJECTION, SOLUTION SUBCUTANEOUS at 21:08

## 2022-10-10 RX ADMIN — INSULIN LISPRO 1 UNITS: 100 INJECTION, SOLUTION INTRAVENOUS; SUBCUTANEOUS at 17:20

## 2022-10-10 RX ADMIN — TACROLIMUS 1 MG: 1 CAPSULE ORAL at 09:04

## 2022-10-10 RX ADMIN — ZOLPIDEM TARTRATE 5 MG: 5 TABLET, COATED ORAL at 23:24

## 2022-10-10 RX ADMIN — CLOPIDOGREL BISULFATE 75 MG: 75 TABLET ORAL at 09:03

## 2022-10-10 RX ADMIN — ZOLPIDEM TARTRATE 5 MG: 5 TABLET, COATED ORAL at 21:11

## 2022-10-10 RX ADMIN — HEPARIN SODIUM 5000 UNITS: 5000 INJECTION INTRAVENOUS; SUBCUTANEOUS at 05:21

## 2022-10-10 RX ADMIN — INSULIN LISPRO 1 UNITS: 100 INJECTION, SOLUTION INTRAVENOUS; SUBCUTANEOUS at 21:07

## 2022-10-10 RX ADMIN — ATORVASTATIN CALCIUM 80 MG: 80 TABLET, FILM COATED ORAL at 17:20

## 2022-10-10 NOTE — PROGRESS NOTES
[address] [facility]  Progress Note - Marques Ross 1950, 74 y o  male MRN: [id number]  Unit/Bed#: [facility] 926-01 Encounter: [id number]  Primary Care Provider: Vincent Min DO   Date and time admitted to hospital: 10/8/2022  2:12 PM    History of hydronephrosis  Assessment & Plan  W/ RT PCN  Per his significant other she states it was leaking around the tube and pt had come into IR for tube check yesterday  She states she has noticed urine drainage had contained mucus  Presenting UA with pyuria   F/u urine cx-cultures pending  Will empirically txt with CTX  Mild leukocytosis  Urology recommended IR consult for tube check-discussed with  Will order flush for daily  If the creatinine is up titrating will add IR consultation    Acute encephalopathy  Assessment & Plan  Underlying cognitive impairment as confirmed per his significant other   Pt w/ prior hx of CVA  Worsening question if due to metabolic encephalopathy due to UTI  Will obtain neuropsych eval  Pt's significant other has POA-will bring the document    Insomnia  Assessment & Plan  On both rrestoril and ambien verified via pdmp  Will decrease ambien to 5mg and restoril to 15mg and make PRN instead  Not sure if could be contributing to falls    Pulmonary nodule  Assessment & Plan  RT upper lobe 9mm, new since 2021; suspicious for metastatic disease  Will need arrangement for PET scan as outpt  Pt to f/u with oncologist - Dr Omid Roche results and need for follow up with his significant other    Thrombocytopenia ([address] )  Assessment & Plan  Chronic    Anemia  Assessment & Plan  Secondary to anemia of chronic disese  No sign of bleeding    DM II (diabetes mellitus, type II), controlled [facility])  Assessment & Plan  No results found for: HGBA1C    Recent Labs     10/09/22  2110 10/10/22  0739 10/10/22  1147 10/10/22  1657   POCGLU 199* 104 224* 176*       Blood Sugar Average: Last 72 hrs:  (P) 154 75  HgA1c of 6 8  Cont levemir  Place on ISS    History of liver transplant [facility])  Assessment & Plan  On prograf  Check level    History of bladder cancer  Assessment & Plan  S/p chemo/RT  Pt follows with oncology and palliative care    Chronic kidney disease (CKD), stage IV (severe) (HCC)  Assessment & Plan  Lab Results   Component Value Date    EGFR 17 10/10/2022    EGFR 21 10/09/2022    EGFR 22 10/08/2022    CREATININE 3 32 (H) 10/10/2022    CREATININE 2 73 (H) 10/09/2022    CREATININE 2 70 (H) 10/08/2022   Cr w/in baseline range  Hx of hydronephrosis w/ RT PCN in place; s/p recent tube check on 10/7  Creatinine is up today-recheck tomorrow and if persistently elevated get Nephrology evaluation/IR to re-evaluate the nephrostomy-nephrostomy was internalized by Interventional Radiology    Facial laceration  Assessment & Plan  S/p suturing      * Fall  Assessment & Plan  Hx of multiple falls for which he has been admitted multiple times  Had been attributed to orthostatic hypotension  And w/ FTT  Pt unable to care for himself  Goal was to place in SNF on last admission however pt ends up signing out AMA  Unclear of medical decision capacity thus will obtain neuropsych eval  S/p mckeon ct   Resulting facial laceration s/p sutures  Cont supportive care  Fall precaution  PT/OT  Check orthostatics-noted to be positive for orthostatic continue with the IV fluids  If the patient is persistently orthostatic positive may need to start on midodrine/Florinef      VTE Pharmacologic Prophylaxis:   Moderate Risk (Score 3-4) - Pharmacological DVT Prophylaxis Ordered: heparin  Patient Centered Rounds: I performed bedside rounds with nursing staff today  Discussions with Specialists or Other Care Team Provider:     Education and Discussions with Family / Patient: updated significant drain  Time Spent for Care: 30 minutes  More than 50% of total time spent on counseling and coordination of care as described above      Current Length of Stay: 2 day(s)  Current Patient Status: Inpatient   Certification Statement: The patient will continue to require additional inpatient hospital stay due to pending  neuropsych eval  Discharge Plan: Anticipate discharge in >72 hrs to discharge location to be determined pending rehab evaluations  Code Status: Level 1 - Full Code    Subjective:   Patient seen examined  Patient denies any specific complaints  Intermittent dysuria noted  Discussed with the Interventional Radiology  Recent internalization of the nephrostomy tube    Objective:     Vitals:   Temp (24hrs), Av °F (36 7 °C), Min:97 9 °F (36 6 °C), Max:98 1 °F (36 7 °C)    Temp:  [97 9 °F (36 6 °C)-98 1 °F (36 7 °C)] 98 1 °F (36 7 °C)  HR:  [] 87  Resp:  [18] 18  BP: ()/(50-74) 139/74  SpO2:  [100 %] 100 %  Body mass index is 23 43 kg/m²  Input and Output Summary (last 24 hours):   No intake or output data in the 24 hours ending 10/10/22 1955    Physical Exam:   Physical Exam  Constitutional:       General: He is not in acute distress  Appearance: He is not ill-appearing  HENT:      Head: Normocephalic  Nose: Nose normal    Eyes:      General: No scleral icterus  Cardiovascular:      Rate and Rhythm: Normal rate and regular rhythm  Heart sounds: Normal heart sounds  Pulmonary:      Breath sounds: Normal breath sounds  Abdominal:      General: There is no distension  Comments: Right-sided nephrostomy tube   Musculoskeletal:         General: Normal range of motion  Skin:     General: Skin is warm  Neurological:      General: No focal deficit present  Mental Status: He is alert           Additional Data:     Labs:  Results from last 7 days   Lab Units 10/10/22  0516 10/09/22  0441 10/08/22  1509   WBC Thousand/uL 10 60*   < > 10 44*   HEMOGLOBIN g/dL 7 7*   < > 8 9*   HEMATOCRIT % 25 6*   < > 28 3*   PLATELETS Thousands/uL 115*   < > 140*   NEUTROS PCT %  --   --  81*   LYMPHS PCT %  --   --  6*   MONOS PCT % --   --  9   EOS PCT %  --   --  3    < > = values in this interval not displayed  Results from last 7 days   Lab Units 10/10/22  0516   SODIUM mmol/L 137   POTASSIUM mmol/L 4 2   CHLORIDE mmol/L 110*   CO2 mmol/L 22   BUN mg/dL 44*   CREATININE mg/dL 3 32*   ANION GAP mmol/L 5   CALCIUM mg/dL 8 1*   ALBUMIN g/dL 2 2*   TOTAL BILIRUBIN mg/dL 0 16*   ALK PHOS U/L 74   ALT U/L 13   AST U/L 14   GLUCOSE RANDOM mg/dL 112     Results from last 7 days   Lab Units 10/08/22  1509   INR  1 14     Results from last 7 days   Lab Units 10/10/22  1657 10/10/22  1147 10/10/22  0739 10/09/22  2110 10/09/22  1705 10/09/22  1121 10/08/22  2051 10/08/22  1827   POC GLUCOSE mg/dl 176* 224* 104 199* 140 195* 97 103         Results from last 7 days   Lab Units 10/08/22  1509   PROCALCITONIN ng/ml 0 15       Lines/Drains:  Invasive Devices  Report    Central Venous Catheter Line  Duration           Port A Cath Right Subclavian -- days          Drain  Duration           Percutaneous Nephroureteral Tube (PCNU) Right 1 -- days                Central Line:  Goal for removal: Will discontinue when meds requiring line are completed               Imaging:  No new images to review    Recent Cultures (last 7 days):   Results from last 7 days   Lab Units 10/09/22  0929   URINE CULTURE  Culture too young- will reincubate       Last 24 Hours Medication List:   Current Facility-Administered Medications   Medication Dose Route Frequency Provider Last Rate   • acetaminophen  650 mg Oral Q6H PRN Jermaine Nannette, DO     • aluminum-magnesium hydroxide-simethicone  30 mL Oral Q6H PRN Jermaine Nannette, DO     • atorvastatin  80 mg Oral After Dinner Jermaine Nannette, DO     • cefTRIAXone  1,000 mg Intravenous Q24H Jermaine Nannette, DO 1,000 mg (10/09/22 1942)   • clopidogrel  75 mg Oral Daily Jermaine Nannette, DO     • heparin (porcine)  5,000 Units Subcutaneous [facility] Ashley Carreon, DO     • insulin detemir 15 Units Subcutaneous HS Eric Drone, DO     • insulin lispro  1-5 Units Subcutaneous HS Eric Drone, DO     • insulin lispro  1-6 Units Subcutaneous TID AC Ashley Carreon, DO     • loperamide  2 mg Oral BID PRN Eric Drone, DO     • ondansetron  4 mg Intravenous Q6H PRN Eric Drone, DO     • oxyCODONE  2 5 mg Oral Q4H PRN Sravanthi Ruff MD     • pregabalin  50 mg Oral TID Eric Drone, DO     • sodium chloride  100 mL/hr Intravenous Continuous Sravanthi Ruff MD 75 mL/hr (10/09/22 1538)   • tacrolimus  1 mg Oral Q12H [address] Ashley Carreon, DO     • tamsulosin  0 4 mg Oral After Melisa Oswald, DO     • zolpidem  5 mg Oral HS PRN Eric Drone, DO          Today, Patient Was Seen By: Sravanthi Ruff    **Please Note: This note may have been constructed using a voice recognition system  **

## 2022-10-10 NOTE — ASSESSMENT & PLAN NOTE
Hx of multiple falls for which he has been admitted multiple times  Had been attributed to orthostatic hypotension  And w/ FTT  Pt unable to care for himself  Goal was to place in SNF on last admission however pt ends up signing out AMA  Unclear of medical decision capacity thus will obtain neuropsych eval  S/p mckeon ct   Resulting facial laceration s/p sutures  Cont supportive care  Fall precaution  PT/OT  Check orthostatics-noted to be positive for orthostatic continue with the IV fluids    If the patient is persistently orthostatic positive may need to start on midodrine/Florinef

## 2022-10-10 NOTE — ASSESSMENT & PLAN NOTE
RT upper lobe 9mm, new since 2021; suspicious for metastatic disease  Will need arrangement for PET scan as outpt  Pt to f/u with oncologist - Dr Toño Vance results and need for follow up with his significant other

## 2022-10-10 NOTE — ASSESSMENT & PLAN NOTE
Lab Results   Component Value Date    EGFR 17 10/10/2022    EGFR 21 10/09/2022    EGFR 22 10/08/2022    CREATININE 3 32 (H) 10/10/2022    CREATININE 2 73 (H) 10/09/2022    CREATININE 2 70 (H) 10/08/2022   Cr w/in baseline range  Hx of hydronephrosis w/ RT PCN in place; s/p recent tube check on 10/7  Creatinine is up today-recheck tomorrow and if persistently elevated get Nephrology evaluation/IR to re-evaluate the nephrostomy-nephrostomy was internalized by Interventional Radiology

## 2022-10-10 NOTE — UTILIZATION REVIEW
Initial Clinical Review    Admission: Date/Time/Statement:   Admission Orders (From admission, onward)     Ordered        10/08/22 1609  INPATIENT ADMISSION  Once                      Orders Placed This Encounter   Procedures   • INPATIENT ADMISSION     Standing Status:   Standing     Number of Occurrences:   1     Order Specific Question:   Level of Care     Answer:   Med Surg [16]     Order Specific Question:   Estimated length of stay     Answer:   More than 2 Midnights     Order Specific Question:   Certification     Answer:   I certify that inpatient services are medically necessary for this patient for a duration of greater than two midnights  See H&P and MD Progress Notes for additional information about the patient's course of treatment  ED Arrival Information     Expected   -    Arrival   10/8/2022 14:12    Acuity   Emergent            Means of arrival   Ambulance    Escorted by   LORETTA Grajeda    Service   Hospitalist    Admission type   Emergency            Arrival complaint   Fall           Chief Complaint   Patient presents with   • Fall     Pt in from home, multiple unwitnessed falls over last couple of days  Initial Presentation: 74 y o  male with PMhx of T2 DM, bladder cancer, R-sided nephrostomy tube, CKD and cirrhosis s/p liver transplant in 2003 presents to ED via ems s/p fall at home on Plavix  Pt fell OOB ~ 30 min pta  (+) head strike  Lac noted about R eye brow  Does not remember fall  Admits to 2-3 falls over past 24 hrs  Pt had been seen in ED 2 days ago, admitted 2 wks ago, but left AMA both times  Pt SO states he's been more confused over past few days and has not been eating or drinking  C/o pain in head  Imaging in ED neg for acute injuries  On exam:  GCS 15, Lac repaired with sutures  C-collar in place  R-sided lower chest wall tenderness  R-sided PCN in place  BUN 45, Cr 2 70  WBC 10 44, hgb 8 9, hct 28 3    Admit inpatient to M/S unit under medical service with multiple falls  Previous fall had been attributed to orthostatic hypotension  Tele monitoring  Check orthostatic bps  Monitor BMP  UA (+), ucx pending  Start ceftriaxone  Urology consulted with recommendation for IR consult for tube check  Pt takes Brandi Letters and restoril at home  Decrease ambien to 5 mg, change restoril to 15 mg prn  Continue Levemir, place on accuchecks qid w/ ssi  SCD's  Pt lives alone, unable to care for himself and unclear if has medical decision capability d/t signing out AMA  Neuropsych eval ordered  Date: 10/9   Day 2:  Pt had syncope while RN attempting to ambulate pt to BR and was assisted back to bed  Ofrthostatic bp's noted to be positive -- start IVFs and monitor  Continue IV ceftriaxone and po meds  Ucx pending  Continue to monitor nephrostomy tube  SCDs  OOB with assist  PT/OT with recommendation for home with HHC/PT  Neuropsych eval pending         ED Triage Vitals   Temperature Pulse Respirations Blood Pressure SpO2   10/08/22 1415 10/08/22 1415 10/08/22 1415 10/08/22 1415 10/08/22 1415   97 8 °F (36 6 °C) 97 (!) 0 139/77 99 %      Temp Source Heart Rate Source Patient Position - Orthostatic VS BP Location FiO2 (%)   10/08/22 1415 10/08/22 1415 10/08/22 1421 10/08/22 1830 --   Tympanic Monitor Lying Left arm       Pain Score       10/08/22 1600       7          Wt Readings from Last 1 Encounters:   10/08/22 60 kg (132 lb 4 4 oz)     Additional Vital Signs:   Date/Time Temp Pulse Resp BP MAP (mmHg) SpO2 O2 Device Patient Position - Orthostatic VS   10/09/22 16:11:53 99 1 °F (37 3 °C) 89 16 114/61 79 98 % -- --   10/09/22 1131 -- 94 -- 109/60 -- -- -- Lying   10/09/22 1128 -- 118 Abnormal  -- 69/45 Abnormal  -- -- -- Standing - Orthostatic VS   10/09/22 1127 -- 111 Abnormal  -- 71/47 Abnormal  -- -- -- Sitting - Orthostatic VS   10/09/22 1125 -- 107 Abnormal  -- 90/62 -- -- -- Lying - Orthostatic VS   10/09/22 08:04:13 98 4 °F (36 9 °C) 97 18 117/62 80 99 % -- --   10/09/22 04:59:21 98 8 °F (37 1 °C) 99 -- 120/64 83 98 % -- --   10/08/22 21:57:21 98 4 °F (36 9 °C) 91 20 141/68 92 100 % None (Room air) --   10/08/22 1900 -- 82 19 121/70 90 98 % None (Room air) Lying   10/08/22 1600 -- 100 21 157/83 -- 99 % None (Room air) Lying   10/08/22 1555 -- 106 Abnormal  28 Abnormal  -- -- 99 % -- --   10/08/22 1530 -- 104 21 148/84 -- 99 % None (Room air) Lying   10/08/22 1520 -- 102 23 Abnormal  -- -- 99 % -- --   10/08/22 1515 -- 100  21  165/84  -- 100 %  None (Room air) Lying   10/08/22 1510 -- 94 25 Abnormal  -- -- 99 % -- --   10/08/22 1505 -- 96 28 Abnormal  -- -- 100 % -- --   10/08/22 1500 -- 98 20 142/75 -- 98 % None (Room air) --   10/08/22 1435 -- 96 20 168/101 Abnormal  -- 98 % None (Room air) --   10/08/22 14:21:15 -- 93 20 177/105 Abnormal  -- 98 % None (Room air) Lying   10/08/22 14:20:54 -- -- -- 177/105 Abnormal  -- -- -- --   10/08/22 14:15:35 97 8 °F (36 6 °C) 97 20 139/77 -- 99 % None (Room air) --       Pertinent Labs/Diagnostic Test Results:   TRAUMA - CT head wo contrast   Final Result by Hema Barth MD (10/08 1519)      No acute intracranial abnormality  Stable chronic microangiopathic changes within the brain  No change since recent prior  TRAUMA - CT spine cervical wo contrast   Final Result by Hema Barth MD (10/08 1509)      No cervical spine fracture or traumatic malalignment  TRAUMA - CT chest abdomen pelvis w contrast   Final Result by Hema Barth MD (10/08 1533)      1  No evidence of traumatic injury in the chest, abdomen or pelvis  2   Right upper lobe solid nodule measuring 9 mm, new since 2021, suspicious for metastatic disease  3   Right percutaneous nephroureterostomy in place with mild right hydronephrosis and a focus of air in the right renal collecting system, similar to the prior study    Delayed right nephrogram       4   Stable diffuse urinary bladder wall thickening with a focus of air in the urinary bladder, likely related to indwelling stent  XR Trauma multiple (SLB/SLRA trauma bay ONLY)   Final Result by Adelaida Galaviz MD (10/08 1722)      No acute cardiopulmonary disease within limitations of supine imaging  XR chest 1 view   Final Result by Adelaida Galaviz MD (10/10 6598)      No acute cardiopulmonary disease within limitations of supine imaging              Results from last 7 days   Lab Units 10/09/22  0441 10/08/22  1509   WBC Thousand/uL 10 74* 10 44*   HEMOGLOBIN g/dL 9 1* 8 9*   HEMATOCRIT % 29 7* 28 3*   PLATELETS Thousands/uL 128* 140*   NEUTROS ABS Thousands/µL  --  8 46*     Results from last 7 days   Lab Units 10/09/22  0441 10/08/22  1509   SODIUM mmol/L 136 135   POTASSIUM mmol/L 4 6 4 4   CHLORIDE mmol/L 105 106   CO2 mmol/L 23 24   ANION GAP mmol/L 8 5   BUN mg/dL 40* 45*   CREATININE mg/dL 2 73* 2 70*   EGFR ml/min/1 73sq m 21 22   CALCIUM mg/dL 8 6 8 3     Results from last 7 days   Lab Units 10/08/22  1509   AST U/L 12   ALT U/L 17   ALK PHOS U/L 76   TOTAL PROTEIN g/dL 7 4   ALBUMIN g/dL 2 9*   TOTAL BILIRUBIN mg/dL 0 26   BILIRUBIN DIRECT mg/dL 0 12     Results from last 7 days   Lab Units 10/09/22  2110 10/09/22  1705 10/09/22  1121 10/08/22  2051 10/08/22  1827   POC GLUCOSE mg/dl 199* 140 195* 97 103     Results from last 7 days   Lab Units 10/09/22  0441 10/08/22  1509   GLUCOSE RANDOM mg/dL 98 170*     Results from last 7 days   Lab Units 10/08/22  1903 10/08/22  1716 10/08/22  1509   HS TNI 0HR ng/L  --   --  5   HS TNI 2HR ng/L  --  6  --    HSTNI D2 ng/L  --  1  --    HS TNI 4HR ng/L 8  --   --    HSTNI D4 ng/L 3  --   --      Results from last 7 days   Lab Units 10/08/22  1509   PROTIME seconds 14 9*   INR  1 14   PTT seconds 24       Results from last 7 days   Lab Units 10/08/22  1509   PROCALCITONIN ng/ml 0 15     Results from last 7 days   Lab Units 10/08/22  1509   CLARITY UA  Extra Turbid   COLOR UA  Light Orange   SPEC GRAV UA  1 021   PH UA 6 5   GLUCOSE UA mg/dl 100 (1/10%)*   KETONES UA mg/dl Negative   BLOOD UA  Large*   PROTEIN UA mg/dl 600 (3+)*   NITRITE UA  Negative   BILIRUBIN UA  Negative   UROBILINOGEN UA (BE) mg/dl <2 0   LEUKOCYTES UA  Large*   WBC UA /hpf Innumerable*   RBC UA /hpf Innumerable*   BACTERIA UA /hpf None Seen   EPITHELIAL CELLS WET PREP /hpf None Seen     Results from last 7 days   Lab Units 10/09/22  0929   URINE CULTURE  Culture too young- will reincubate       ED Treatment:   Medication Administration from 10/08/2022 1411 to 10/08/2022 2029       Date/Time Order Dose Route Action     10/08/2022 1504 lidocaine-epinephrine (XYLOCAINE/EPINEPHRINE) 1 %-1:100,000 injection 10 mL 10 mL Infiltration Given by Other     10/08/2022 1622 acetaminophen (TYLENOL) tablet 975 mg 975 mg Oral Given     Admitting Diagnosis: History of hydronephrosis [Z87 448]  Cognitive impairment [R41 89]  Fall, initial encounter [W19  XXXA]  Unspecified multiple injuries, initial encounter [T07  XXXA]  Age/Sex: 74 y o  male  Admission Orders:  Scheduled Medications:  atorvastatin, 80 mg, Oral, After Dinner  cefTRIAXone, 1,000 mg, Intravenous, Q24H  clopidogrel, 75 mg, Oral, Daily  heparin (porcine), 5,000 Units, Subcutaneous, Q8H [address]  insulin detemir, 15 Units, Subcutaneous, HS  insulin lispro, 1-5 Units, Subcutaneous, HS  insulin lispro, 1-6 Units, Subcutaneous, TID AC  pregabalin, 50 mg, Oral, TID  tacrolimus, 1 mg, Oral, Q12H JUNIOR  tamsulosin, 0 4 mg, Oral, After Dinner    Continuous IV Infusions:  sodium chloride, 75 mL/hr, Intravenous, Continuous    PRN Meds:  acetaminophen, 650 mg, Oral, Q6H PRN  aluminum-magnesium hydroxide-simethicone, 30 mL, Oral, Q6H PRN  loperamide, 2 mg, Oral, BID PRN  ondansetron, 4 mg, Intravenous, Q6H PRN  zolpidem, 5 mg, Oral, HS PRN        IP CONSULT TO NEUROPSYCHOLOGY    Network Utilization Review Department  ATTENTION: Please call with any questions or concerns to [phone] and carefully listen to the prompts so that you are directed to the right person  All voicemails are confidential   Harlene Pair all requests for admission clinical reviews, approved or denied determinations and any other requests to dedicated fax number below belonging to the campus where the patient is receiving treatment   List of dedicated fax numbers for the Facilities:  [address] DENIALS (Administrative/Medical Necessity) [phone]   [address] (Maternity/NICU/Pediatrics) [phone]   1 Laura Ramirez [phone]   Marly Rowe  [phone]   1306 [facility] [address] [address] Lisa KumarA[facility] [phone]   1553 [facility] Praveen Esquivel [facility] 134 815 [facility] [phone]

## 2022-10-10 NOTE — ASSESSMENT & PLAN NOTE
No results found for: HGBA1C    Recent Labs     10/09/22  2110 10/10/22  0739 10/10/22  1147 10/10/22  1657   POCGLU 199* 104 224* 176*       Blood Sugar Average: Last 72 hrs:  (P) 154 75  HgA1c of 6 8  Cont levemir  Place on ISS

## 2022-10-10 NOTE — RESTORATIVE TECHNICIAN NOTE
Restorative Technician Note      Patient Name: Sam Morales     Restorative Tech Visit Date: 10/10/2022  Note Type: Mobility  Patient Position Upon Consult: Bedside chair  Activity Performed: Ambulated  Assistive Device: Roller walker  Patient Position at End of Consult:  All needs within reach (pt seated in BR; educated to ring call bell when finished)    [facility]

## 2022-10-10 NOTE — PLAN OF CARE
Problem: OCCUPATIONAL THERAPY ADULT  Goal: Performs self-care activities at highest level of function for planned discharge setting  See evaluation for individualized goals  Description: Treatment Interventions: Cognitive reorientation          See flowsheet documentation for full assessment, interventions and recommendations  Note: Limitation: Decreased cognition  Prognosis: Fair  Assessment: Pt is a 74 y o  male admitted to [facility]"" on 10/8/2022 w/ Fall, (+) HS  Pt also dx with acute encephalopathy and hydronephrosis  PMH includes bladder cx, liver transplant, pulmonary nodule, CKD, DM  Pt with active OT orders and up with assistance  orders  As per pt report, pta, resides with his wife and dtr in a 2STH, FFSU, 4STE  Pt was I w/  ADLS and shares IADLS with family  Upon evaluation, pt currently requires S for transfers and mobility  Pt currently requires I eating, I grooming, I UB ADLs, S LB ADLs, and S toileting  Pt primarily limited by decreased cognition - including memory and safety awareness  Pt to benefit from continued OT services to administer formal cognitive assessment to aide in safe d/c planning and recommendations  The patient's raw score on the AM-PAC Daily Activity inpatient short form is 21, standardized score is 44 27, greater than 39 4  Patients at this level are likely to benefit from discharge to home  Please refer to the recommendation of the Occupational Therapist for safe discharge planning       OT Discharge Recommendation: Home with outpatient rehabilitation (OPOT for cognition)

## 2022-10-10 NOTE — RESTORATIVE TECHNICIAN NOTE
Restorative Technician Note      Patient Name: Sam Morales     Restorative Tech Visit Date: 10/10/2022  Note Type: Mobility  Patient Position Upon Consult: Supine  Activity Performed: Ambulated  Assistive Device: Roller walker  Patient Position at End of Consult: Bedside chair;  All needs within [facility]

## 2022-10-10 NOTE — OCCUPATIONAL THERAPY NOTE
Occupational Therapy Evaluation and Treatment     Patient Name: Xena Ribera  Today's Date: 10/10/2022  Problem List  Principal Problem:    Fall  Active Problems:    Facial laceration    Chronic kidney disease (CKD), stage IV (severe) (HCC)    History of bladder cancer    History of liver transplant ([facility] [address] [address] )    DM II (diabetes mellitus, type II), controlled ([facility] [address] )    Anemia    Thrombocytopenia (HCC)    Pulmonary nodule    Insomnia    Acute encephalopathy    History of hydronephrosis    Past Medical History  No past medical history on file  Past Surgical History  No past surgical history on file  10/10/22 1201   OT Last Visit   OT Visit Date 10/10/22   Note Type   Note type Evaluation  (and tx)   Pain Assessment   Pain Assessment Tool 0-10   Pain Score 7   Pain Location/Orientation Orientation: Lower; Location: Abdomen  (pt reports pain 2* catheter  RN aware)   Hospital Pain Intervention(s) Repositioned; Ambulation/increased activity   Restrictions/Precautions   Weight Bearing Precautions Per Order No   Other Precautions Cognitive; Chair Alarm; Bed Alarm;Multiple lines; Fall Risk;Pain   Home Living   Type of [address] [place] [address] Two level; Able to live on main level with bedroom/bathroom; Performs ADLs on one level  (4STE, FFSU)   Bathroom Shower/Tub Tub/shower unit   Bathroom Toilet Raised   Home Equipment Cane  (pt reports possibly having cane at home but did not use PTA)   Prior Function   Level of [place] Independent with ADLs   Lives With Spouse;Daughter   Receives Help From Family   IADLs   (Pt reports wife and dtr do cooking and laundry, pt assists with cleaning)   Falls in the last 6 months 1 to 4  (2 per pt report)   Vocational Retired   Lifestyle   Autonomy PTA, pt reports being I with ADLs, shares IADLs with family, I fnxl mobility   Reciprocal Relationships Spouse, dtr   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   Intrinsic Gratification Watching [facility] Comments BPs as follows: sitting - 126/63, standing - 88/50, post activity - 105/60  Pt asymptomatic t/o session  ADL   Where Assessed Chair   Eating Assistance 7  Independent   Grooming Assistance 7  Independent   UB Bathing Assistance 7  Independent   LB Bathing Assistance 5  Supervision/Setup   UB Dressing Assistance 7  Independent   LB Dressing Assistance 5  Supervision/Setup   Toileting Assistance  5  Supervision/Setup   Bed Mobility   Supine to Sit Unable to assess   Sit to Supine Unable to assess   Transfers   Sit to Stand 5  Supervision   Stand to Sit 5  Supervision   Additional Comments transfers w/o AD   Functional Mobility   Functional Mobility 5  Supervision   Balance   Static Sitting Good   Dynamic Sitting Fair +   Static Standing Fair   Dynamic Standing Fair   Ambulatory Fair   Activity Tolerance   Activity Tolerance Patient tolerated treatment well   Nurse Made Aware RN clearance t/o session   RUE Assessment   RUE Assessment WFL   LUE Assessment   LUE Assessment WFL   Vision - Complex Assessment   Ocular Range of Motion Intact   Tracking Intact   Psychosocial   Psychosocial (WDL) WDL   Cognition   Overall Cognitive Status Impaired   Arousal/Participation Responsive; Cooperative   Attention Within functional limits   Orientation Level Oriented to person;Oriented to place;Oriented to situation;Oriented to time  (oriented to time using environmental cues)   Memory Decreased short term memory   Following Commands Follows one step commands without difficulty   Comments Pt pleasant and cooperative t/o session  Pt able to recall 1/3 words  Unable to recall additional words after cueing     Cognition Assessment Tools MOCA   Score 13   MOCA   Version 7 1   Visuopatial/Executive 2   Naming 1   Memory 0   Attention: Digits 2   Attention: Letters 1   Attention: Serial 1   Language: Repeat 0   Language: Fluency 0   Abstraction 0   Delayed Recall 0   Orientation 5   Does patient have less than or equal to 12 years of education? 1   MOCA Total Score 13   Assessment   Limitation Decreased cognition   Prognosis Fair   Assessment Pt is a 74 y o  male admitted to [facility] on 10/8/2022 w/ Fall, (+) HS  Pt also dx with acute encephalopathy and hydronephrosis  PMH includes bladder cx, liver transplant, pulmonary nodule, CKD, DM  Pt with active OT orders and up with assistance  orders  As per pt report, pta, resides with his wife and dtr in a 2STH, FFSU, 4STE  Pt was I w/  ADLS and shares IADLS with family  Upon evaluation, pt currently requires S for transfers and mobility  Pt currently requires I eating, I grooming, I UB ADLs, S LB ADLs, and S toileting  Pt primarily limited by decreased cognition - including memory and safety awareness  Pt to benefit from continued OT services to administer formal cognitive assessment to aide in safe d/c planning and recommendations  The patient's raw score on the AM-PAC Daily Activity inpatient short form is 21, standardized score is 44 27, greater than 39 4  Patients at this level are likely to benefit from discharge to home  Please refer to the recommendation of the Occupational Therapist for safe discharge planning     Goals   STG Time Frame 3-5   Plan   Treatment Interventions Cognitive reorientation   Goal Expiration Date 10/15/22   OT Treatment Day 1   OT Frequency 1-2x/wk   Recommendation   OT Discharge Recommendation Home with outpatient rehabilitation  (OPOT for cognition)   AM-PAC Daily Activity Inpatient   Lower Body Dressing 3   Bathing 3   Toileting 3   Upper Body Dressing 4   Grooming 4   Eating 4   Daily Activity Raw Score 21   Daily Activity Standardized Score (Calc for Raw Score >=11) 44 27   AM-PAC Applied Cognition Inpatient   Following a Speech/Presentation 3   Understanding Ordinary Conversation 4   Taking Medications 4   Remembering Where Things Are Placed or Put Away 3   Remembering List of 4-5 Errands 2   Taking Care of Complicated Tasks 1   Applied Cognition Raw Score 17 Applied Cognition Standardized Score 36 52   Additional Treatment Session   Start Time 1135   End Time 1201   Treatment Assessment Pt seen for additional OT tx session to administer [facility] Cognitive Assessment  Pt scored 13/30 indicating a mild cognitive impairment  Pt noted to have poor short term memory and executive functioning  Please see further details regarding score below  Pt to benefit from additional OT session to administer Stephanie Womack Cognitive Level Screen to aide in safe d/c planning and recommendations, including level of supervision required for home  From OT standpoint, recommendation would be home with OPOT - pending further cognitive assessment  No further acute OT needs  D/C OT  Please re-consult if needed  Thank you  Pt was left after session with all current needs met  The patient's raw score on the AM-PAC Daily Activity inpatient short form is 21, standardized score is 44 27, greater than 39 4  Patients at this level are likely to benefit from discharge to home  Please refer to the recommendation of the Occupational Therapist for safe discharge planning  Additional Treatment Day 1      GOALS    - Pt will engage in ongoing cognitive assessment w/ G participation to assist w/ safe d/c planning/recommendations    MOCA    VISUOSPATIAL/EXECUTIVE FUNCTION: 2/5  Pt was unable to complete trail  Pt was unable to copy cube  Pt was able to draw a clock, accurately place the numbers, but unable to properly place the hands at ten past eleven  NAMIN/3  Pt able to name 1/3 animals  ATTENTION: 4/6  Pt was able to repeat sequence of numbers forwards and backwards  Pt able to attend to sequence of letters  Pt was able to correctly subtract 7 from 100 1/5 times  LANGUAGE: 0/3  Pt was unable to repeat sentences back to therapist  Pt was able to produce 7 starting with the letter F in 1 minute  ABSTRACTION: 0/2  Pt was able to identify the similarity of two items x2 trials      DELAYED RECALL: 0/5  Pt recalled 2/5 words without cues  Pt recalled 1/5 words with category cue  Pt recalled 3/5 words with multiple choice options  ORIENTATION: 5/6  Pt is oriented to date, month, year, place and city but disoriented to day        Samuel Bell MS, OTR/L

## 2022-10-10 NOTE — PLAN OF CARE
Problem: MOBILITY - ADULT  Goal: Maintain or return to baseline ADL function  Description: INTERVENTIONS:  -  Assess patient's ability to carry out ADLs; assess patient's baseline for ADL function and identify physical deficits which impact ability to perform ADLs (bathing, care of mouth/teeth, toileting, grooming, dressing, etc )  - Assess/evaluate cause of self-care deficits   - Assess range of motion  - Assess patient's mobility; develop plan if impaired  - Assess patient's need for assistive devices and provide as appropriate  - Encourage maximum independence but intervene and supervise when necessary  - Involve family in performance of ADLs  - Assess for home care needs following discharge   - Consider OT consult to assist with ADL evaluation and planning for discharge  - Provide patient education as appropriate  Outcome: Progressing  Goal: Maintains/Returns to pre admission functional level  Description: INTERVENTIONS:  - Perform BMAT or MOVE assessment daily    - Set and communicate daily mobility goal to care team and patient/family/caregiver  - Collaborate with rehabilitation services on mobility goals if consulted  - Perform Range of Motion *** times a day  - Reposition patient every *** hours    - Dangle patient *** times a day  - Stand patient *** times a day  - Ambulate patient *** times a day  - Out of bed to chair *** times a day   - Out of bed for meals *** times a day  - Out of bed for toileting  - Record patient progress and toleration of activity level   Outcome: Progressing     Problem: Prexisting or High Potential for Compromised Skin Integrity  Goal: Skin integrity is maintained or improved  Description: INTERVENTIONS:  - Identify patients at risk for skin breakdown  - Assess and monitor skin integrity  - Assess and monitor nutrition and hydration status  - Monitor labs   - Assess for incontinence   - Turn and reposition patient  - Assist with mobility/ambulation  - Relieve pressure over bony prominences  - Avoid friction and shearing  - Provide appropriate hygiene as needed including keeping skin clean and dry  - Evaluate need for skin moisturizer/barrier cream  - Collaborate with interdisciplinary team   - Patient/family teaching  - Consider wound care consult   Outcome: Progressing     Problem: Potential for Falls  Goal: Patient will remain free of falls  Description: INTERVENTIONS:  - Educate patient/family on patient safety including physical limitations  - Instruct patient to call for assistance with activity   - Consult OT/PT to assist with strengthening/mobility   - Keep Call bell within reach  - Keep bed low and locked with side rails adjusted as appropriate  - Keep care items and personal belongings within reach  - Initiate and maintain comfort rounds  - Make Fall Risk Sign visible to staff  - Offer Toileting every *** Hours, in advance of need  - Initiate/Maintain ***alarm  - Obtain necessary fall risk management equipment: ***  - Apply yellow socks and bracelet for high fall risk patients  - Consider moving patient to room near nurses station  Outcome: Progressing

## 2022-10-10 NOTE — ASSESSMENT & PLAN NOTE
W/ RT PCN  Per his significant other she states it was leaking around the tube and pt had come into IR for tube check yesterday  She states she has noticed urine drainage had contained mucus  Presenting UA with pyuria   F/u urine cx-cultures pending  Will empirically txt with CTX  Mild leukocytosis  Urology recommended IR consult for tube check-discussed with    Will order flush for daily  If the creatinine is up titrating will add IR consultation

## 2022-10-11 LAB
ABO GROUP BLD: NORMAL
ANION GAP SERPL CALCULATED.3IONS-SCNC: 6 MMOL/L (ref 4–13)
BACTERIA UR CULT: NORMAL
BLD GP AB SCN SERPL QL: NEGATIVE
BUN SERPL-MCNC: 46 MG/DL (ref 5–25)
CALCIUM SERPL-MCNC: 7.9 MG/DL (ref 8.3–10.1)
CHLORIDE SERPL-SCNC: 113 MMOL/L (ref 96–108)
CO2 SERPL-SCNC: 20 MMOL/L (ref 21–32)
CREAT SERPL-MCNC: 3.1 MG/DL (ref 0.6–1.3)
ERYTHROCYTE [DISTWIDTH] IN BLOOD BY AUTOMATED COUNT: 15.9 % (ref 11.6–15.1)
FERRITIN SERPL-MCNC: 533 NG/ML (ref 8–388)
GFR SERPL CREATININE-BSD FRML MDRD: 18 ML/MIN/1.73SQ M
GLUCOSE SERPL-MCNC: 110 MG/DL (ref 65–140)
GLUCOSE SERPL-MCNC: 131 MG/DL (ref 65–140)
GLUCOSE SERPL-MCNC: 232 MG/DL (ref 65–140)
GLUCOSE SERPL-MCNC: 52 MG/DL (ref 65–140)
GLUCOSE SERPL-MCNC: 90 MG/DL (ref 65–140)
HCT VFR BLD AUTO: 23.1 % (ref 36.5–49.3)
HCT VFR BLD AUTO: 28.3 % (ref 36.5–49.3)
HGB BLD-MCNC: 6.8 G/DL (ref 12–17)
HGB BLD-MCNC: 8.9 G/DL (ref 12–17)
IRON SATN MFR SERPL: 19 % (ref 20–50)
IRON SERPL-MCNC: 26 UG/DL (ref 65–175)
MCH RBC QN AUTO: 23.7 PG (ref 26.8–34.3)
MCHC RBC AUTO-ENTMCNC: 29.4 G/DL (ref 31.4–37.4)
MCV RBC AUTO: 81 FL (ref 82–98)
PLATELET # BLD AUTO: 119 THOUSANDS/UL (ref 149–390)
PMV BLD AUTO: 12.4 FL (ref 8.9–12.7)
POTASSIUM SERPL-SCNC: 3.9 MMOL/L (ref 3.5–5.3)
RBC # BLD AUTO: 2.87 MILLION/UL (ref 3.88–5.62)
RH BLD: POSITIVE
SODIUM SERPL-SCNC: 139 MMOL/L (ref 135–147)
SPECIMEN EXPIRATION DATE: NORMAL
TIBC SERPL-MCNC: 134 UG/DL (ref 250–450)
WBC # BLD AUTO: 8.84 THOUSAND/UL (ref 4.31–10.16)

## 2022-10-11 PROCEDURE — 80048 BASIC METABOLIC PNL TOTAL CA: CPT | Performed by: FAMILY MEDICINE

## 2022-10-11 PROCEDURE — 86901 BLOOD TYPING SEROLOGIC RH(D): CPT | Performed by: STUDENT IN AN ORGANIZED HEALTH CARE EDUCATION/TRAINING PROGRAM

## 2022-10-11 PROCEDURE — 86850 RBC ANTIBODY SCREEN: CPT | Performed by: STUDENT IN AN ORGANIZED HEALTH CARE EDUCATION/TRAINING PROGRAM

## 2022-10-11 PROCEDURE — 97116 GAIT TRAINING THERAPY: CPT

## 2022-10-11 PROCEDURE — 85027 COMPLETE CBC AUTOMATED: CPT | Performed by: FAMILY MEDICINE

## 2022-10-11 PROCEDURE — NC001 PR NO CHARGE: Performed by: STUDENT IN AN ORGANIZED HEALTH CARE EDUCATION/TRAINING PROGRAM

## 2022-10-11 PROCEDURE — 86900 BLOOD TYPING SEROLOGIC ABO: CPT | Performed by: STUDENT IN AN ORGANIZED HEALTH CARE EDUCATION/TRAINING PROGRAM

## 2022-10-11 PROCEDURE — 99223 1ST HOSP IP/OBS HIGH 75: CPT | Performed by: INTERNAL MEDICINE

## 2022-10-11 PROCEDURE — 83550 IRON BINDING TEST: CPT | Performed by: INTERNAL MEDICINE

## 2022-10-11 PROCEDURE — 97530 THERAPEUTIC ACTIVITIES: CPT

## 2022-10-11 PROCEDURE — 85018 HEMOGLOBIN: CPT | Performed by: INTERNAL MEDICINE

## 2022-10-11 PROCEDURE — P9016 RBC LEUKOCYTES REDUCED: HCPCS

## 2022-10-11 PROCEDURE — 30233N1 TRANSFUSION OF NONAUTOLOGOUS RED BLOOD CELLS INTO PERIPHERAL VEIN, PERCUTANEOUS APPROACH: ICD-10-PCS | Performed by: INTERNAL MEDICINE

## 2022-10-11 PROCEDURE — 83540 ASSAY OF IRON: CPT | Performed by: INTERNAL MEDICINE

## 2022-10-11 PROCEDURE — 85014 HEMATOCRIT: CPT | Performed by: INTERNAL MEDICINE

## 2022-10-11 PROCEDURE — 82728 ASSAY OF FERRITIN: CPT | Performed by: INTERNAL MEDICINE

## 2022-10-11 PROCEDURE — 97535 SELF CARE MNGMENT TRAINING: CPT

## 2022-10-11 PROCEDURE — 82948 REAGENT STRIP/BLOOD GLUCOSE: CPT

## 2022-10-11 PROCEDURE — 86920 COMPATIBILITY TEST SPIN: CPT

## 2022-10-11 PROCEDURE — 99233 SBSQ HOSP IP/OBS HIGH 50: CPT | Performed by: INTERNAL MEDICINE

## 2022-10-11 PROCEDURE — 99221 1ST HOSP IP/OBS SF/LOW 40: CPT | Performed by: STUDENT IN AN ORGANIZED HEALTH CARE EDUCATION/TRAINING PROGRAM

## 2022-10-11 RX ORDER — SODIUM BICARBONATE 650 MG/1
650 TABLET ORAL
Status: DISCONTINUED | OUTPATIENT
Start: 2022-10-11 | End: 2022-10-13 | Stop reason: HOSPADM

## 2022-10-11 RX ORDER — TEMAZEPAM 15 MG/1
15 CAPSULE ORAL
Status: DISCONTINUED | OUTPATIENT
Start: 2022-10-11 | End: 2022-10-12

## 2022-10-11 RX ADMIN — HEPARIN SODIUM 5000 UNITS: 5000 INJECTION INTRAVENOUS; SUBCUTANEOUS at 06:14

## 2022-10-11 RX ADMIN — INSULIN LISPRO 3 UNITS: 100 INJECTION, SOLUTION INTRAVENOUS; SUBCUTANEOUS at 18:22

## 2022-10-11 RX ADMIN — TAMSULOSIN HYDROCHLORIDE 0.4 MG: 0.4 CAPSULE ORAL at 18:19

## 2022-10-11 RX ADMIN — ATORVASTATIN CALCIUM 80 MG: 80 TABLET, FILM COATED ORAL at 18:19

## 2022-10-11 RX ADMIN — INSULIN DETEMIR 15 UNITS: 100 INJECTION, SOLUTION SUBCUTANEOUS at 21:17

## 2022-10-11 RX ADMIN — SODIUM BICARBONATE 650 MG TABLET 650 MG: at 10:23

## 2022-10-11 RX ADMIN — CEFTRIAXONE SODIUM 1000 MG: 10 INJECTION, POWDER, FOR SOLUTION INTRAVENOUS at 19:26

## 2022-10-11 RX ADMIN — TEMAZEPAM 15 MG: 15 CAPSULE ORAL at 21:17

## 2022-10-11 RX ADMIN — SODIUM BICARBONATE 650 MG TABLET 650 MG: at 18:19

## 2022-10-11 RX ADMIN — OXYCODONE HYDROCHLORIDE 2.5 MG: 5 TABLET ORAL at 10:23

## 2022-10-11 RX ADMIN — CLOPIDOGREL BISULFATE 75 MG: 75 TABLET ORAL at 10:23

## 2022-10-11 RX ADMIN — TACROLIMUS 1 MG: 1 CAPSULE ORAL at 21:17

## 2022-10-11 RX ADMIN — TACROLIMUS 1 MG: 1 CAPSULE ORAL at 10:24

## 2022-10-11 RX ADMIN — PREGABALIN 50 MG: 50 CAPSULE ORAL at 21:17

## 2022-10-11 RX ADMIN — PREGABALIN 50 MG: 50 CAPSULE ORAL at 18:19

## 2022-10-11 RX ADMIN — PREGABALIN 50 MG: 50 CAPSULE ORAL at 10:23

## 2022-10-11 NOTE — PLAN OF CARE
Problem: PHYSICAL THERAPY ADULT  Goal: Performs mobility at highest level of function for planned discharge setting  See evaluation for individualized goals  Description: Treatment/Interventions: Functional transfer training, LE strengthening/ROM, Therapeutic exercise, Endurance training, Elevations, Gait training, Bed mobility, Equipment eval/education, OT          See flowsheet documentation for full assessment, interventions and recommendations  Outcome: Adequate for Discharge  Note: Prognosis: Good  Problem List: Decreased mobility, Impaired balance (dizziness with mobility)  Assessment: Pt seen for PT treatment session this date  Therapy session focused on bed mobility, functional transfers, gait training and stair training in order to improve overall mobility and independence  Pt completes be mobility tasks at mod I level, functional transfers at I level and ambulation without AD at S level  Pt with no LOB noted, steady on feet throughout session  Pt negotiates 7 steps using HR at S level  Pt was left supine in bed at the end of PT session with all needs in reach  Pt with no questions or concerns regarding d/c home, no complaints of lightheadedness/ dizziness throughout session  Pt functioning at/ near baseline mobility levels - no further acute inpatient PT needs at this time  Pt reports that spouse and daughter home with pt throughout day and can provide assistance as needed  PT to d/c pt and recommends home with family support and OPPT  The patient's AM-PAC Basic Mobility Inpatient Short Form Raw Score is 20  A Raw score of greater than 16 suggests the patient may benefit from discharge to home  Please also refer to the recommendation of the Physical Therapist for safe discharge planning  PT Discharge Recommendation: Home with outpatient rehabilitation (+ family support)    See flowsheet documentation for full assessment

## 2022-10-11 NOTE — OCCUPATIONAL THERAPY NOTE
Occupational Therapy Progress Note     Patient Name: Renetta Xiong  Today's Date: 10/11/2022  Problem List  Principal Problem:    Fall  Active Problems:    Facial laceration    Chronic kidney disease (CKD), stage IV (severe) (HCC)    History of bladder cancer    History of liver transplant ([facility] )    DM II (diabetes mellitus, type II), controlled ([facility] )    Anemia    Thrombocytopenia (HCC)    Pulmonary nodule    Insomnia    Acute encephalopathy    History of hydronephrosis            10/11/22 1115   OT Last Visit   OT Visit Date 10/11/22   Note Type   Note Type Treatment   Pain Assessment   Pain Assessment Tool 0-10   Pain Score No Pain   Restrictions/Precautions   Weight Bearing Precautions Per Order No   ADL   UB Dressing Assistance 7  Independent   UB Dressing Comments donned robe in standing   Toileting Assistance  5  Supervision/Setup   Toileting Deficit Supervison/safety   Bed Mobility   Supine to Sit 7  Independent   Sit to Supine 7  Independent   Transfers   Sit to Stand 7  Independent   Stand to Sit 7  Independent   Functional Mobility   Functional Mobility 5  Supervision   Additional Comments w/in room and bathroom   Cognition   Arousal/Participation Alert; Cooperative   Attention Within functional limits   Orientation Level Oriented X4   Memory Decreased short term memory   Following Commands Follows one step commands without difficulty   Activity Tolerance   Activity Tolerance Patient tolerated treatment well   Medical Staff Made Aware RN   Assessment   Assessment Patient participated in Skilled OT session this date with interventions consisting of ADL re-training, functional cognition, functional transfers/mobility  Patient agreeable to OT treatment session, upon arrival patient was found supine in bed  Did not administer ACLS d/t acute encephalopathy which appears to be resolving per EMR  Pt was able to state correct phone number and procedure in case of emergency   Pt reports he has 24/7 support from family  Pt reports his wife manages his pill organizer and writes down all appointments for him  Pt acknowledges he had memory/cognitive deficits and reports he "feels a lot better" today  Patient is now demonstrating ability to complete UB/LB ADLs at [facility], without the use of DME  Patient appears to be functioning near baseline  No further acute OT needs identified at this time to warrant continuation of services  D/C OT services  From OT standpoint, recommendation at time of d/c would be OUTPATIENT OT for further cognitive training  Plan   Treatment Interventions Cognitive reorientation;Patient/family training   Goal Expiration Date 10/15/22   OT Treatment Day 2   OT Frequency 1-2x/wk   Recommendation   OT Discharge Recommendation Home with outpatient rehabilitation  (for cognition)   AM-PAC Daily Activity Inpatient   Lower Body Dressing 3   Bathing 3   Toileting 4   Upper Body Dressing 4   Grooming 4   Eating 4   Daily Activity Raw Score 22   Daily Activity Standardized Score (Calc for Raw Score >=11) 47  1   AM-PAC Applied Cognition Inpatient   Following a Speech/Presentation 3   Understanding Ordinary Conversation 4   Taking Medications 3   Remembering Where Things Are Placed or Put Away 3   Remembering List of 4-5 Errands 2   Taking Care of Complicated Tasks 2   Applied Cognition Raw Score 17   Applied Cognition Standardized Score 36 52   Modified Garfield Scale   Modified Garfield Scale 3          The patient's raw score on the AM-PAC Daily Activity inpatient short form is 22, standardized score is 47 1, greater than 39 4  Patients at this level are likely to benefit from discharge to home  Please refer to the recommendation of the Occupational Therapist for safe discharge planning          Pushpa Manzano

## 2022-10-11 NOTE — CONSULTS
CONSULT: GASTROENTEROLOGY          Inpatient consult to gastroenterology     Performed by  Aurelio Morocho MD     Authorized by Len Duarte MD            PATIENT INFORMATION      Marques Ross 74 y o  male MRN: [id number]  [facility] Encounter: [id number]  PCP: Vincent Min DO  Date of Admission:  10/8/2022  Date of Consultation: 10/11/22  Requesting Physician: Halima Pack MD    ASSESSMENTS & PLAN   Marques Ross is a 74 y o  old male with PMH of including but not limited to liver transplant in 2002, active bladder cancer under treatment with in nephrostomy tubes, CKD who is currently admitted after a fall attributed to generalized weakness  Gastroenterology team has been consulted for assistance with management of subtherapeutic tacrolimus level    1  Subtherapeutic tacrolimus level  Patient with a history of liver transplant in 2002 done at [facility], since then patient has been following up with the hepatology team there, last visit in 2020 since then patient has been managed by primary care provider  He is on tacrolimus 1 b i d  which he reports has been very compliant with, tacrolimus level 10/9/'22 1 7   -- d/w our hepatology team, this far out from the transplant the levels are acceptable, would also think about reducing doses further which can be done as an outpatient as long as he does not have a hx of rejection  -- for now no inpatient modifications required  -- continue the same dose if ok with nephrology especially in setting of worsening kidney disease, ok with reducing dose of tacrolimus if further worsening is noted  -- fortunately at this time patient's LFTs are within normal limits, continue monitor    2  Symptomatic microcytic anemia  Patient presenting with hemoglobin of 8 9, downtrending to 6 8 this morning  MCV 81 today  Platelet 249, white count 8 8  Patient denies any active bleeding    Last colonoscopy in early 2022 aborted due to inadequate prep   Previous colonoscopy in 2012 unremarkable  No previous history of upper endoscopy  Iron panel 07/22, showing iron 30 TIBC 343 iron sat 16    -- patient has no overt signs of bleeding so continue to monitor, patient explaining reluctance to stay inpatient further; wants to be discharged  -- iron panel in July suggestive of borderline iron deficiency, will recommend repeating it, if continues to show iron deficiency, will recommend performing EGD and colonoscopy inpatient versus outpatient as patient desires in light of incomplete colonoscopy or earlier this year as well as no history of previous endoscopy on chart review  -- agree with transfusion, check hemoglobin serially, rule out other cause of anemia    HISTORY OF PRESENT ILLNESS      66-year-old male with past medical history including but not limited to previous CVA, status post liver transplant in 2002 currently on tacrolimus, bladder cancer currently under treatment, in C2 nephrostomy tube, CKD who is admitted at this time for recent fall at home  After recent admission he was recommended to be discharged to SNF however patient ended up signing out AMA  Patient initially presented as a trauma alert, currently admitted under medicine services for further workup  During my encounter patient reports that he has been feeling well, denies any nausea, vomiting, abdominal pain, yellowish discoloration of urine or sclera, blood in stool, constipation, diarrhea  Reports no recent changes in medications  Restricted reports that he has never missed out on a single dose of tacrolimus  His last appointment with [facility] hepatology was a long time ago, upper limit being discharged from the hospital last time       Reports that transplant was done in 2002 at [facility], does not recall the indication at the time; from chart review sounds like since 2020 patient has not followed up with transplant team, mainly managed by his primary care provider who was in the local area  Also previous history of HB core antibody positive  REVIEW OF SYSTEMS     A thorough 12-point review of systems has been conducted  Pertinent positives and negatives are mentioned in the history of present illness  PAST MEDICAL & SURGICAL HISTORY      No past medical history on file  No past surgical history on file  MEDICATIONS & ALLERGIES       Medications:   Prior to Admission medications    Medication Sig Start Date End Date Taking?  Authorizing Provider   clopidogrel (PLAVIX) 75 mg tablet Take 75 mg by mouth daily   Yes Historical Provider, MD   insulin detemir (LEVEMIR) 100 units/mL subcutaneous injection Inject 15 Units under the skin daily at bedtime   Yes Historical Provider, MD   loperamide (IMODIUM) 2 mg capsule Take 2 mg by mouth 2 (two) times a day as needed for diarrhea   Yes Historical Provider, MD   pregabalin (LYRICA) 50 mg capsule Take 50 mg by mouth 3 (three) times a day   Yes Historical Provider, MD   rosuvastatin (CRESTOR) 40 MG tablet Take 40 mg by mouth daily   Yes Historical Provider, MD   sodium chloride 0 9 % SOLN Inject 10 mL into a catheter in a vein in the morning Flush through PCN catheter daily   Yes Historical Provider, MD   tacrolimus (PROGRAF) 1 mg capsule Take 1 mg by mouth every 12 (twelve) hours   Yes Historical Provider, MD   tamsulosin (Flomax) 0 4 mg Take 0 4 mg by mouth daily with dinner   Yes Historical Provider, MD   temazepam (RESTORIL) 7 5 mg capsule Take 30 mg by mouth daily at bedtime as needed for sleep   Yes Historical Provider, MD   zolpidem (Ambien) 10 mg tablet Take 10 mg by mouth daily at bedtime as needed for sleep   Yes Historical Provider, MD       Allergies: Not on File    SOCIAL HISTORY      Substance Use History:   Social History     Substance and Sexual Activity   Alcohol Use Not on file     Social History     Tobacco Use   Smoking Status Not on file   Smokeless Tobacco Not on file     Social History Substance and Sexual Activity   Drug Use Not on file       FAMILY HISTORY      As in the HPI  PHYSICAL EXAM     Vitals:   Blood Pressure: 146/69 (10/11/22 0807)  Pulse: 79 (10/11/22 0807)  Temperature: 97 7 °F (36 5 °C) (10/11/22 0807)  Temp Source: Skin (10/08/22 2157)  Respirations: 16 (10/11/22 0807)  Height: 5' 3" (160 cm) (10/08/22 2043)  Weight - Scale: 60 kg (132 lb 4 4 oz) (10/08/22 2043)  SpO2: 99 % (10/11/22 0807)    Physical Exam:   GENERAL: NAD  HEENT:  NC/AT, MMM  CARDIAC:  RRR, +S1/S2, no S3/S4 heard  PULMONARY:  CTA B/L, no wheezing/rales/rhonci, non-labored breathing  ABDOMEN:  Benign, previous surgical scar seen  RECTAL:  Normal appearing stool  NEUROLOGIC:  Alert/oriented x3  EXTREMITIES: No swelling  SKIN:  No rashes or erythema     ADDITIONAL DATA     Lab Results:     Results from last 7 days   Lab Units 10/11/22  0614 10/09/22  0441 10/08/22  1509   WBC Thousand/uL 8 84   < > 10 44*   HEMOGLOBIN g/dL 6 8*   < > 8 9*   HEMATOCRIT % 23 1*   < > 28 3*   PLATELETS Thousands/uL 119*   < > 140*   NEUTROS PCT %  --   --  81*   LYMPHS PCT %  --   --  6*   MONOS PCT %  --   --  9   EOS PCT %  --   --  3    < > = values in this interval not displayed  Results from last 7 days   Lab Units 10/11/22  0614 10/10/22  0516   POTASSIUM mmol/L 3 9 4 2   CHLORIDE mmol/L 113* 110*   CO2 mmol/L 20* 22   BUN mg/dL 46* 44*   CREATININE mg/dL 3 10* 3 32*   CALCIUM mg/dL 7 9* 8 1*   ALK PHOS U/L  --  74   ALT U/L  --  13   AST U/L  --  14     Results from last 7 days   Lab Units 10/08/22  1509   INR  1 14       Imaging:    TRAUMA - CT head wo contrast    Result Date: 10/8/2022  Narrative: CT BRAIN - WITHOUT CONTRAST INDICATION:   TRAUMA  COMPARISON:  Head CT 10/6/2022  TECHNIQUE:  CT examination of the brain was performed  In addition to axial images, sagittal and coronal 2D reformatted images were created and submitted for interpretation   Radiation dose length product (DLP) for this visit:  911 56 mGy-cm    This examination, like all CT scans performed in the [facility], was performed utilizing techniques to minimize radiation dose exposure, including the use of iterative  reconstruction and automated exposure control  IMAGE QUALITY:  Diagnostic  FINDINGS: PARENCHYMA: Decreased attenuation is noted in periventricular and subcortical white matter demonstrating an appearance that is statistically most likely to represent mild microangiopathic change, stable since recent prior  Chronic lacunar infarction(s) are noted in basal ganglia, stable  Encephalomalacia within the medial temporal lobe and left basal ganglia, stable  No CT signs of acute infarction  No intracranial mass, mass effect or midline shift  No acute parenchymal hemorrhage  Mild dural thickening with calcifications underlying a left pterional craniotomy, likely postsurgical, similar to the prior study  Aneurysm clips in the left M1 region  VENTRICLES AND EXTRA-AXIAL SPACES:  Normal for the patient's age  VISUALIZED ORBITS AND PARANASAL SINUSES:  Unremarkable  CALVARIUM AND EXTRACRANIAL SOFT TISSUES:  Left pterional craniotomy  Impression: No acute intracranial abnormality  Stable chronic microangiopathic changes within the brain  No change since recent prior  Workstation performed: [id number]     TRAUMA - CT spine cervical wo contrast    Result Date: 10/8/2022  Narrative: CT CERVICAL SPINE - WITHOUT CONTRAST INDICATION:   TRAUMA  COMPARISON:  None  TECHNIQUE:  CT examination of the cervical spine was performed without intravenous contrast   Contiguous axial images were obtained  Sagittal and coronal reconstructions were performed  Radiation dose length product (DLP) for this visit:  333 3 mGy-cm   This examination, like all CT scans performed in the [facility], was performed utilizing techniques to minimize radiation dose exposure, including the use of iterative reconstruction and automated exposure control  IMAGE QUALITY:  Diagnostic  FINDINGS: ALIGNMENT:  Normal alignment of the cervical spine  No subluxation  VERTEBRAL BODIES:  No fracture  DEGENERATIVE CHANGES:  Moderate multilevel cervical degenerative changes are noted  No critical central canal stenosis  PREVERTEBRAL AND PARASPINAL SOFT TISSUES:  Unremarkable  THORACIC INLET:  Please refer to the concurrent chest, abdomen, and pelvic CT report for description of the thoracic inlet findings  Impression: No cervical spine fracture or traumatic malalignment  Workstation performed: [id number]     XR chest 1 view    Result Date: 10/10/2022  Narrative: TRAUMA SERIES INDICATION:  TRAUMA  COMPARISON:  None VIEWS:  XR TRAUMA MULTIPLE  FINDINGS: CHEST: Supine frontal view of the chest is obtained  Venous infusion device entering from right internal jugular approach terminates in the expected location of the right atrium  Cardiomediastinal silhouette is within normal limits accounting for technique and patient positioning  Atherosclerotic calcification of the aortic arch  Lungs are clear within the limitations of a shallow inspiratory depth  No layering pleural effusions detected  No pneumothorax is seen on this supine film  Upright images are more sensitive to detect anterior pneumothoraces if relevant  Multiple old appearing right lower rib fractures  Partially visualized nephrostomy tube right upper abdomen  Impression: No acute cardiopulmonary disease within limitations of supine imaging  Workstation performed: [id number]     TRAUMA - CT chest abdomen pelvis w contrast    Result Date: 10/8/2022  Narrative: CT CHEST, ABDOMEN AND PELVIS WITH IV CONTRAST INDICATION:   TRAUMA  COMPARISON:  CT chest/abdomen/pelvis 5/23/2021  CT abdomen/pelvis 10/6/2022  TECHNIQUE: CT examination of the chest, abdomen and pelvis was performed  Axial, sagittal, and coronal 2D reformatted images were created from the source data and submitted for interpretation   Radiation dose length product (DLP) for this visit:  688 8 mGy-cm   This examination, like all CT scans performed in the [facility], was performed utilizing techniques to minimize radiation dose exposure, including the use of iterative reconstruction and automated exposure control  IV Contrast:  100 mL of iohexol (OMNIPAQUE) Enteric Contrast: Enteric contrast was administered  FINDINGS: CHEST LUNGS:  Scattered linear atelectasis  There is a 9 mm right upper lobe solid nodule, new since 2021  There is no tracheal or endobronchial lesion  PLEURA:  Unremarkable  HEART/GREAT VESSELS: Heart is unremarkable for patient's age  No thoracic aortic aneurysm  MEDIASTINUM AND JOHN:  Unremarkable  CHEST WALL AND LOWER NECK:  Right chest wall port with catheter tip cavoatrial junction  ABDOMEN LIVER/BILIARY TREE:  Unremarkable  GALLBLADDER:  Gallbladder is surgically absent  SPLEEN:  Unremarkable  PANCREAS:  Unremarkable  ADRENAL GLANDS:  Unremarkable  KIDNEYS/URETERS:  Right percutaneous nephroureteral stent in place with mild right hydronephrosis and a focus of gas within the right renal collecting system, similar the prior study  Delayed right nephrogram   Right renal atrophy  One or more sharply circumscribed subcentimeter renal hypodensities are present, too small to accurately characterize, and statistically most likely benign findings  According to recent literature (Radiology 2019) no further workup of these findings is recommended  STOMACH AND BOWEL:  Unremarkable  APPENDIX:  No findings to suggest appendicitis  ABDOMINOPELVIC CAVITY:  No ascites  No pneumoperitoneum  No lymphadenopathy  VESSELS:  Atherosclerotic changes are present  No evidence of aneurysm  PELVIS REPRODUCTIVE ORGANS:  The prostate is enlarged  URINARY BLADDER:  Again seen is diffuse urinary bladder wall thickening, with a focus of air in the bladder, likely related to stent insertion  ABDOMINAL WALL/INGUINAL REGIONS:  Unremarkable   OSSEOUS STRUCTURES:  No acute fracture or destructive osseous lesion  Spinal degenerative changes are noted  Old healed right posterior 7th through 10th rib fractures  Impression: 1  No evidence of traumatic injury in the chest, abdomen or pelvis  2   Right upper lobe solid nodule measuring 9 mm, new since 2021, suspicious for metastatic disease  3   Right percutaneous nephroureterostomy in place with mild right hydronephrosis and a focus of air in the right renal collecting system, similar to the prior study  Delayed right nephrogram  4   Stable diffuse urinary bladder wall thickening with a focus of air in the urinary bladder, likely related to indwelling stent  I personally discussed this study with Devan Siddiqi on 10/8/2022 at 3:32 PM   Workstation performed: [id number]     XR Trauma multiple ([facility] trauma bay ONLY)    Result Date: 10/8/2022  Narrative: TRAUMA SERIES INDICATION:  TRAUMA  COMPARISON:  None VIEWS:  XR TRAUMA MULTIPLE  FINDINGS: CHEST: Supine frontal view of the chest is obtained  Venous infusion device entering from right internal jugular approach terminates in the expected location of the right atrium  Cardiomediastinal silhouette is within normal limits accounting for technique and patient positioning  Atherosclerotic calcification of the aortic arch  Lungs are clear within the limitations of a shallow inspiratory depth  No layering pleural effusions detected  No pneumothorax is seen on this supine film  Upright images are more sensitive to detect anterior pneumothoraces if relevant  Multiple old appearing right lower rib fractures  Partially visualized nephrostomy tube right upper abdomen  Impression: No acute cardiopulmonary disease within limitations of supine imaging  Workstation performed: [id number]     [address] [facility] bedside procedure    Result Date: 10/8/2022  Narrative: 1 2 [id number]  1 [id number]  1 [id number]      EKG, Pathology, and Other Studies Reviewed on Admission:   · EKG: Reviewed    Counseling / Coordination of Care Time: 30 total mins spent n consult  Greater than 50% of total time spent on patient counseling and coordination of care  Joe Willard MD   PGY-4, Department of Gastroenterology     ** Please Note: This note is constructed using a voice recognition dictation system   **

## 2022-10-11 NOTE — PROGRESS NOTES
[address] [facility]  Progress Note - Madelaine Perez 1950, 74 y o  male MRN: [id number]  [facility] Encounter: [id number]  Primary Care Provider: Billy Loera DO   Date and time admitted to hospital: 10/8/2022  2:12 PM      * Status post fall  Assessment & Plan  Hx of multiple falls for which he has been admitted multiple times  Had been attributed to orthostatic hypotension  Suspected failure to thrive - unable to care for himself  Goal was to place in SNF on last admission however pt ends up signing out AMA  Unclear of medical decision capacity, thus awaiting neuropsychology evaluation  S/p pain CT imaging without evidence of acute fractures/dislocations  Resulting facial laceration s/p suturing in the ED  Supportive care  Fall precautions  Hold Ambien (continue Restoril for insomnia) - consider decreasing Lyrica dosing in the setting of progressive kidney disease  PT/OT evaluation appreciated -> recommending home health services once medically stable  Orthostatics on 10/9 were positive - continue IV fluids in limit/avoid offending medications (if present) -> check repeat orthostatics  Possible UTI also likely contributory - see below  Will appreciate geriatrics evaluation for suspected polypharmacy    History of hydronephrosis  Assessment & Plan  S/p right percutaneous nephrostomy tube  Per his significant other, she states it was leaking around the tube and pt had come into IR for tube check recently  She states she has noticed urine drainage had contained mucus    Presenting UA with pyuria   Urine culture growing mixed contaminants - in light of encephalopathy and leukocytosis, will complete antibiotic course - currently on IV Rocephin  Urology recommended IR consult for tube check -> will involve IR if creatinine does not progressively downtrend  Continue Flomax     Acute metabolic encephalopathy  Assessment & Plan  Underlying cognitive impairment as confirmed per his significant other  Pt w/ prior hx of CVA  Worsening question if due to metabolic encephalopathy due to UTI  Await neuropsychology evaluation  Pt's significant other has POA - will bring the document    Anemia of chronic disease  Assessment & Plan  Monitor hemoglobin - dropped to 6 8 today -> plan for PRBC transfusion     Insomnia  Assessment & Plan   On both Restoril and Ambien at home (verified via PDMP per previous provider) -> high fall risk on these two agents combined  Hold Ambien and will trial Restoril monotherapy     Pulmonary nodule  Assessment & Plan  RT upper lobe 9mm, new since 2021; suspicious for metastatic disease  Will need arrangement for PET scan as outpt  Pt to f/u with outpatient oncologist (Dr Natalie Victoria)  Communicated results and need for follow up with his significant other     Thrombocytopenia  Assessment & Plan  Chronic issue   Monitor platelet count - monitor for bleeding     Diabetes mellitus type 2 with neuropathy  Assessment & Plan  No results found for: HGBA1C  Continue basal insulin w/ additional SSI coverage per Accu-Cheks  Continue Lyrica for neuropathy    History of liver transplant  Assessment & Plan  Continue Tacrolimus  Check morning level prior to next dose     History of bladder cancer  Assessment & Plan  S/p chemotherapy/radiation  Follows outpatient oncology and palliative care     Acute kidney injury superimposed on chronic kidney disease stage 4  Assessment & Plan  Baseline creatinine approximately 2 8-2 9 - remains elevated but improved at 3 1 from a peak of 3 32  Appreciate nephrology input  Monitor renal function and urine output - limit/avoid nephrotoxins if possible - avoid hypotension as possible  H/o hydronephrosis s/p right PCN placement noted - last tube check on 10/7 per previous provider's documentation      DVT Prophylaxis:  Heparin SC       Patient Centered Rounds:  I have performed bedside rounds and discussed plan of care with nursing today    Discussions with Specialists or Other Care Team Provider:  see above assessments if applicable    Education and Discussions with Family / Patient:  Patient, along with family member, at bedside today    Time Spent for Care:  32 minutes  More than 50% of total time spent on counseling and coordination of care as described above  Current Length of Stay: 3 day(s)  Current Patient Status: Inpatient   Certification Statement:  Patient will continue to require additional hospital stay due to assessments as noted above  Code Status: Level 1 - Full Code        Subjective:     Encountered earlier today  Walking around room during my encounter without acute complaints  Does acknowledge persistent weakness/fatigue however  Denies any current falls or dizziness  Objective:     Vitals:   Temp (24hrs), Av 1 °F (36 7 °C), Min:97 7 °F (36 5 °C), Max:98 4 °F (36 9 °C)    Temp:  [97 7 °F (36 5 °C)-98 4 °F (36 9 °C)] 97 7 °F (36 5 °C)  HR:  [] 86  Resp:  [16-20] 19  BP: (143-154)/(69-74) 145/74  SpO2:  [98 %-100 %] 98 %  Body mass index is 23 43 kg/m²  Input and Output Summary (last 24 hours):        Intake/Output Summary (Last 24 hours) at 10/11/2022 1927  Last data filed at 10/11/2022 1715  Gross per 24 hour   Intake 665 ml   Output 1150 ml   Net -485 ml       Physical Exam:     GENERAL:  Weak/fatigued  HEAD:  Normocephalic - right temporal laceration sutured  EYES: PERRL - EOMI   MOUTH:  Mucosa moist  NECK:  Supple - full range of motion  CARDIAC:  Rate controlled - S1/S2 positive  PULMONARY:  Clear breath sounds bilaterally - nonlabored respirations  ABDOMEN:  Soft - nontender/nondistended - active bowel sounds - right PCN tube in place  MUSCULOSKELETAL:  Motor strength/range of motion mildly deconditioned  NEUROLOGIC:  Baseline cognitive impairment noted  SKIN:  Chronic wrinkles/blemishes - various skin tags on back  PSYCHIATRIC:  Mood/affect stable currently      Additional Data:     Labs & Recent Cultures:    Results from last 7 days   Lab Units 10/11/22  0614 10/09/22  0441 10/08/22  1509   WBC Thousand/uL 8 84   < > 10 44*   HEMOGLOBIN g/dL 6 8*   < > 8 9*   HEMATOCRIT % 23 1*   < > 28 3*   PLATELETS Thousands/uL 119*   < > 140*   NEUTROS PCT %  --   --  81*   LYMPHS PCT %  --   --  6*   MONOS PCT %  --   --  9   EOS PCT %  --   --  3    < > = values in this interval not displayed       Results from last 7 days   Lab Units 10/11/22  0614 10/10/22  0516   POTASSIUM mmol/L 3 9 4 2   CHLORIDE mmol/L 113* 110*   CO2 mmol/L 20* 22   BUN mg/dL 46* 44*   CREATININE mg/dL 3 10* 3 32*   CALCIUM mg/dL 7 9* 8 1*   ALK PHOS U/L  --  74   ALT U/L  --  13   AST U/L  --  14     Results from last 7 days   Lab Units 10/08/22  1509   INR  1 14     Results from last 7 days   Lab Units 10/11/22  1643 10/11/22  1122 10/11/22  0804 10/10/22  2051 10/10/22  1657 10/10/22  1147 10/10/22  0739 10/09/22  2110 10/09/22  1705 10/09/22  1121 10/08/22  2051 10/08/22  1827   POC GLUCOSE mg/dl 232* 110 90 203* 176* 224* 104 199* 140 195* 97 103         Results from last 7 days   Lab Units 10/08/22  1509   PROCALCITONIN ng/ml 0 15         Results from last 7 days   Lab Units 10/09/22  0929   URINE CULTURE  80,000-89,000 cfu/ml          Lines/Drains:  Invasive Devices  Report    Central Venous Catheter Line  Duration           Port A Cath Right Subclavian -- days          Drain  Duration           Percutaneous Nephroureteral Tube (PCNU) Right 1 -- days                  Last 24 Hours Medication List:   Current Facility-Administered Medications   Medication Dose Route Frequency Provider Last Rate   • acetaminophen  650 mg Oral Q6H PRN Andrei Carreon DO     • aluminum-magnesium hydroxide-simethicone  30 mL Oral Q6H PRN Robyn Miller DO     • atorvastatin  80 mg Oral After Dinner Robyn Miller DO     • cefTRIAXone  1,000 mg Intravenous Q24H Robyn Miller DO 1,000 mg (10/10/22 2054)   • clopidogrel  75 mg Oral Daily Kailyn Silvestre, DO     • heparin (porcine)  5,000 Units Subcutaneous [facility] Kailyn Silvestre, DO     • insulin detemir  15 Units Subcutaneous HS Kailyn Silvestre DO     • insulin lispro  1-5 Units Subcutaneous HS Kailyn Silvestre, DO     • insulin lispro  1-6 Units Subcutaneous TID AC Ashley Carreon DO     • loperamide  2 mg Oral BID PRN Kailyn Silvestre DO     • ondansetron  4 mg Intravenous Q6H PRN Kailyn Silvestre DO     • oxyCODONE  2 5 mg Oral Q4H PRN Tana Holland MD     • pregabalin  50 mg Oral TID Kailyn Silvestre DO     • sodium bicarbonate  650 mg Oral BID after meals Miley Gaming MD     • sodium chloride  100 mL/hr Intravenous Continuous Tana Holland  mL/hr (10/10/22 2054)   • tacrolimus  1 mg Oral Q12H [address] Ashley Carreon DO     • tamsulosin  0 4 mg Oral After Dinner Kailyn Silvestre DO     • temazepam  15 mg Oral HS PRN Migue Briggs MD                    ** Please Note: This note is constructed using a voice recognition dictation system  An occasional wrong word/phrase or “sound-a-like” substitution may have been picked up by dictation device due to the inherent limitations of voice recognition software  Read the chart carefully and recognize, using reasonable context, where substitutions may have occurred  **

## 2022-10-11 NOTE — RESTORATIVE TECHNICIAN NOTE
Restorative Technician Note      Patient Name: Hector Moya     Restorative Tech Visit Date: 10/11/2022  Note Type: Mobility  Patient Position Upon Consult: Supine  Activity Performed: Ambulated  Assistive Device: Other (Comment) (none)  Patient Position at End of Consult: Bedside chair;  All needs within [facility]

## 2022-10-11 NOTE — PROGRESS NOTES
NEPHROLOGY PROGRESS NOTE   Margarette Palmer 74 y o  male MRN: [id number]  Unit/Bed#: [facility] 926-01 Encounter: [id number]  Reason for Consult: CARLOTTA    ASSESSMENT AND PLAN:  73 yo M with PMH of DM, bladder cancer, with right-sided hydronephrosis s/p PCN, CKD G4, cirrhosis s/p liver transplant  presents after a fall  Nephrology is consulted for management of CARLOTTA    PLAN:    #Non-Oliguric KDIGO CARLOTTA stage 1 on CKD G4  • Etiology:  Multifactorial likely secondary to prerenal , severe hypotension in the setting of poor oral intake  Complicated with hypoperfusion in the settings of anemia  Patient received IV contrast on 10/8 anticipate recurrent CARLOTTA  • Baseline creatinine:  New baseline after CARLOTTA 2 8-2 9 mg/dL  • Peak creatinine:  3 32 mg/dL  • Current creatinine:  3 1 mg/dL, trending down  • UA:  Hematuria, leukocyturia  • Renal imaging :  CT with contrast mild hydronephrosis, right renal atrophy  • Treatment:  • No indication of urgent dialysis at this time, will continue to monitor kidney function  • Maintain MAP:  Over 65 mmHg if possible/avoid hypoperfusion:  Hold parameters on blood pressure medications  • Avoid nephrotoxic agents such as NSAIDs, and IV contrast if possible   Avoid opioids   • Adjust medications to GFR    #CKD:  • Baseline creatinine:  2 8-2 9 mg/dL, new baseline after recent CARLOTTA  • Etiology:  Likely secondary to obstructive uropathy complicated with nephrosclerosis and nephron loss with atrophic kidney      #Acid-base Disorder  • serum HCO3 20 millimoles per L  • A  • HAGMA secondary to CARLOTTA  • Start sodium bicarb 650 mg b i d     #Volume status/hypertension:  • Volume:  Appears hypovolemic, eating and drinking  • Blood pressure:  Hypertensive /69  • Recommend:  • Low-sodium diet  • Enforce fluid intake      # liver transplant  • Tacrolimus 1 mg b i d   • Monitor levels before morning dose      #Anemia:  • Current hemoglobin:  6 8 mg/dL  • Treatment:  • Transfuse for hemoglobin less than 7 0 per primary service      # encephalopathy  · Likely secondary to UTI  · Management as per primary team  · Seems to be back to baseline    SUBJECTIVE:  Patient seen and examined at bedside  No chest pain, shortness of breath, nausea, vomiting, abdominal pain or diarrhea  No urinary complaints         OBJECTIVE:  Current Weight: Weight - Scale: 60 kg (132 lb 4 4 oz)  Vitals:    10/11/22 0807   BP: 146/69   Pulse: 79   Resp: 16   Temp: 97 7 °F (36 5 °C)   SpO2: 99%       Intake/Output Summary (Last 24 hours) at 10/11/2022 0943  Last data filed at 10/11/2022 3071  Gross per 24 hour   Intake 240 ml   Output 1050 ml   Net -810 ml     Wt Readings from Last 3 Encounters:   10/08/22 60 kg (132 lb 4 4 oz)     Temp Readings from Last 3 Encounters:   10/11/22 97 7 °F (36 5 °C)     BP Readings from Last 3 Encounters:   10/11/22 146/69     Pulse Readings from Last 3 Encounters:   10/11/22 79      General:  Elderly, cachectic, no acute distress at this time  Skin:  No acute rash  Eyes:  No scleral icterus and noninjected  ENT:  mucous membranes moist  Neck:  no carotid bruits  Chest:  Right-sided central port, Clear to auscultation percussion, good respiratory effort, no use of accessory respiratory muscles  CVS:  Regular rate and rhythm without rub  Abdomen:  soft and nontender   Extremities:  no significant lower extremity edema  Neuro:  No gross focality  Psych:  Alert , cooperative       Medications:    Current Facility-Administered Medications:   •  acetaminophen (TYLENOL) tablet 650 mg, 650 mg, Oral, Q6H PRN, Alanis Pepper, DO, 650 mg at 10/09/22 2114  •  aluminum-magnesium hydroxide-simethicone (MYLANTA) oral suspension 30 mL, 30 mL, Oral, Q6H PRN, Alanis Pepper, DO  •  atorvastatin (LIPITOR) tablet 80 mg, 80 mg, Oral, After Dinner, Zeke Juarez, DO, 80 mg at 10/10/22 1720  •  ceftriaxone (ROCEPHIN) 1 g/50 mL in dextrose IVPB, 1,000 mg, Intravenous, Q24H, Zeke Juarez, DO, Last Rate: 100 mL/hr at 10/10/22 2054, 1,000 mg at 10/10/22 2054  •  clopidogrel (PLAVIX) tablet 75 mg, 75 mg, Oral, Daily, [facility], DO, 75 mg at 10/10/22 7556  •  heparin (porcine) subcutaneous injection 5,000 Units, 5,000 Units, Subcutaneous, Q8H [facility], formerly [facility], , 5,000 Units at 10/11/22 7979  •  insulin detemir (LEVEMIR) subcutaneous injection 15 Units, 15 Units, Subcutaneous, HS, formerly [facility], DO, 15 Units at 10/10/22 2108  •  insulin lispro (HumaLOG) 100 units/mL subcutaneous injection 1-5 Units, 1-5 Units, Subcutaneous, HS, formerly [facility], DO, 1 Units at 10/10/22 2107  •  insulin lispro (HumaLOG) 100 units/mL subcutaneous injection 1-6 Units, 1-6 Units, Subcutaneous, TID AC, 1 Units at 10/10/22 1720 **AND** Fingerstick Glucose (POCT), , , TID AC, formerly [facility], DO  •  loperamide (IMODIUM) capsule 2 mg, 2 mg, Oral, BID PRN, [facility], DO  •  [facility]etron [facility]) injection 4 mg, 4 mg, Intravenous, Q6H PRN, formerly [facility], DO  •  oxyCODONE (ROXICODONE) IR tablet 2 5 mg, 2 5 mg, Oral, Q4H PRN, Barry Kauffman MD, 2 5 mg at 10/10/22 1059  •  pregabalin (LYRICA) capsule 50 mg, 50 mg, Oral, TID, formerly [facility], DO, 50 mg at 10/10/22 2108  •  sodium bicarbonate tablet 650 mg, 650 mg, Oral, BID after meals, Soto Woodson MD  •  sodium chloride 0 9 % infusion, 100 mL/hr, Intravenous, Continuous, Barry Kauffman MD, Last Rate: 100 mL/hr at 10/10/22 2054, 100 mL/hr at 10/10/22 2054  •  tacrolimus (PROGRAF) capsule 1 mg, 1 mg, Oral, Q12H [facility], Go Zamora, , 1 mg at 10/10/22 2109  •  tamsulosin (FLOMAX) capsule 0 4 mg, 0 4 mg, Oral, After Dinner, Go Zamora DO, 0 4 mg at 10/10/22 1720  •  zolpidem (AMBIEN) tablet 10 mg, 10 mg, Oral, HS PRN, Jiles Sicard, PA-C    Laboratory Results:  Results from last 7 days   Lab Units 10/11/22  0614 10/10/22  0516 10/09/22  0441 10/08/22  1509   WBC Thousand/uL 8 84 10 60* 10 74* 10 44*   HEMOGLOBIN g/dL 6 8* 7 7* 9 1* 8 9*   HEMATOCRIT % 23 1* 25 6* 29 7* 28 3*   PLATELETS Thousands/uL 119* 115* 128* 140*   SODIUM mmol/L 139 137 136 135   POTASSIUM mmol/L 3 9 4 2 4 6 4 4   CHLORIDE mmol/L 113* 110* 105 106   CO2 mmol/L 20* 22 23 24   BUN mg/dL 46* 44* 40* 45*   CREATININE mg/dL 3 10* 3 32* 2 73* 2 70*   CALCIUM mg/dL 7 9* 8 1* 8 6 8 3       TRAUMA - CT head wo contrast   Final Result by Francisca Kidd MD (10/08 1519)      No acute intracranial abnormality  Stable chronic microangiopathic changes within the brain  No change since recent prior  Workstation performed: [id number]         TRAUMA - CT spine cervical wo contrast   Final Result by Francisca Kidd MD (10/08 1509)      No cervical spine fracture or traumatic malalignment  Workstation performed: [id number]         TRAUMA - CT chest abdomen pelvis w contrast   Final Result by Francisca Kidd MD (10/08 1533)      1  No evidence of traumatic injury in the chest, abdomen or pelvis  2   Right upper lobe solid nodule measuring 9 mm, new since 2021, suspicious for metastatic disease  3   Right percutaneous nephroureterostomy in place with mild right hydronephrosis and a focus of air in the right renal collecting system, similar to the prior study  Delayed right nephrogram       4   Stable diffuse urinary bladder wall thickening with a focus of air in the urinary bladder, likely related to indwelling stent  I personally discussed this study with Myah Parr on 10/8/2022 at 3:32 PM                 Workstation performed: [id number]         XR Trauma multiple (SLB/SLRA trauma bay ONLY)   Final Result by Marsha Moser MD (10/08 4990)      No acute cardiopulmonary disease within limitations of supine imaging                 Workstation performed: [id number]         XR chest 1 view   Final Result by Marsha Moser MD (10/10 0738)      No acute cardiopulmonary disease within limitations of supine imaging  Workstation performed: [id number]             Portions of the record may have been created with voice recognition software  Occasional wrong word or "sound a like" substitutions may have occurred due to the inherent limitations of voice recognition software  Read the chart carefully and recognize, using context, where substitutions have occurred

## 2022-10-11 NOTE — PHYSICAL THERAPY NOTE
PHYSICAL THERAPY NOTE          Patient Name: Gene Ahumada LJAMT'H Date: 10/11/2022       10/11/22 1043   PT Last Visit   PT Visit Date 10/11/22   Note Type   Note Type Treatment   Pain Assessment   Pain Assessment Tool 0-10   Pain Score No Pain   Restrictions/Precautions   Weight Bearing Precautions Per Order No   Other Precautions Fall Risk   General   Chart Reviewed Yes   Response to Previous Treatment Patient with no complaints from previous session  Family/Caregiver Present No   Cognition   Arousal/Participation Responsive; Cooperative   Attention Within functional limits   Orientation Level Oriented X4   Following Commands Follows one step commands without difficulty   Comments pt pleasant and cooperative to participate in therapy session   Bed Mobility   Supine to Sit 6  Modified independent   Additional items HOB elevated   Sit to Supine 6  Modified independent   Additional items HOB elevated   Additional Comments pt supine in bed upon arrival  Pt returned to supine in bed with all needs wihtin reach at the end of therapy session   Transfers   Sit to Stand 7  Independent   Stand to Sit 7  Independent   Additional Comments transfers without AD   Ambulation/Elevation   Gait pattern Excessively slow   Gait Assistance 5  Supervision   Assistive Device None   Distance 200ft   Stair Management Assistance 5  Supervision   Stair Management Technique One rail R;Alternating pattern; Foreward   Number of Stairs 7   Balance   Static Sitting Fair +   Dynamic Sitting Fair +   Static Standing Fair   Dynamic Standing [address] [id number]   Activity Tolerance   Activity Tolerance Patient tolerated treatment well   Nurse Made Aware RN cleared pt to participate in therapy session   Assessment   Prognosis Good   Assessment Pt seen for PT treatment session this date   Therapy session focused on bed mobility, functional transfers, gait training and stair training in order to improve overall mobility and independence  Pt completes be mobility tasks at mod I level, functional transfers at I level and ambulation without AD at S level  Pt with no LOB noted, steady on feet throughout session  Pt negotiates 7 steps using HR at S level  Pt was left supine in bed at the end of PT session with all needs in reach  Pt with no questions or concerns regarding d/c home, no complaints of lightheadedness/ dizziness throughout session  Pt functioning at/ near baseline mobility levels - no further acute inpatient PT needs at this time  Pt reports that spouse and daughter home with pt throughout day and can provide assistance as needed  PT to d/c pt and recommends home with family support and OPPT  The patient's AM-PAC Basic Mobility Inpatient Short Form Raw Score is 20  A Raw score of greater than 16 suggests the patient may benefit from discharge to home  Please also refer to the recommendation of the Physical Therapist for safe discharge planning  Goals   Patient Goals to go home   STG Expiration Date 10/23/22   PT Treatment Day 1   Plan   Treatment/Interventions Functional transfer training;LE strengthening/ROM; Elevations; Endurance training;Patient/family training;Bed mobility;Gait training;Spoke to nursing;Spoke to case management   Progress Discontinue PT   Recommendation   PT Discharge Recommendation Home with outpatient rehabilitation  (+ family support)   AM-PAC Basic Mobility Inpatient   Turning in Bed Without Bedrails 4   Lying on Back to Sitting on Edge of Flat Bed 4   Moving Bed to Chair 3   Standing Up From Chair 3   Walk in Room 3   Climb 3-5 Stairs 3   Basic Mobility Inpatient Raw Score 20   Basic Mobility Standardized Score 43 99   Highest Level Of Mobility   JH-HLM Goal 6: Walk 10 steps or more   JH-HLM Achieved 7: Walk 25 feet or more   Education   Education Provided Mobility training   Patient Demonstrates acceptance/verbal understanding   End of Consult Patient Position at End of Consult Supine; All needs within reach     Nidia Yeager, PT, DPT

## 2022-10-11 NOTE — CONSULTS
Please see progress note entered at 9:32 as new consult      Shanique Gilbert MD  Nephrology Attending

## 2022-10-11 NOTE — PLAN OF CARE
Problem: OCCUPATIONAL THERAPY ADULT  Goal: Performs self-care activities at highest level of function for planned discharge setting  See evaluation for individualized goals  Description: Treatment Interventions: Cognitive reorientation          See flowsheet documentation for full assessment, interventions and recommendations  Outcome: Adequate for Discharge  Note: Limitation: Decreased cognition  Prognosis: Fair  Assessment: Patient participated in Skilled OT session this date with interventions consisting of ADL re-training, functional cognition, functional transfers/mobility  Patient agreeable to OT treatment session, upon arrival patient was found supine in bed  Did not administer ACLS d/t acute encephalopathy which appears to be resolving per EMR  Pt was able to state correct phone number and procedure in case of emergency  Pt reports he has 24/7 support from family  Pt reports his wife manages his pill organizer and writes down all appointments for him  Pt acknowledges he had memory/cognitive deficits and reports he "feels a lot better" today  Patient is now demonstrating ability to complete UB/LB ADLs at SUP-IND, without the use of DME  Patient appears to be functioning near baseline  No further acute OT needs identified at this time to warrant continuation of services  D/C OT services  From OT standpoint, recommendation at time of d/c would be HOME OT for further cognitive training       OT Discharge Recommendation: Home with outpatient rehabilitation (for cognition)

## 2022-10-12 PROBLEM — F03.90 DEMENTIA (HCC): Chronic | Status: ACTIVE | Noted: 2022-10-12

## 2022-10-12 LAB
ABO GROUP BLD BPU: NORMAL
ANION GAP SERPL CALCULATED.3IONS-SCNC: 5 MMOL/L (ref 4–13)
BACTERIA BLD CULT: NORMAL
BACTERIA BLD CULT: NORMAL
BASOPHILS # BLD AUTO: 0.03 THOUSANDS/ΜL (ref 0–0.1)
BASOPHILS NFR BLD AUTO: 0 % (ref 0–1)
BPU ID: NORMAL
BUN SERPL-MCNC: 41 MG/DL (ref 5–25)
CALCIUM SERPL-MCNC: 8.2 MG/DL (ref 8.3–10.1)
CHLORIDE SERPL-SCNC: 114 MMOL/L (ref 96–108)
CO2 SERPL-SCNC: 21 MMOL/L (ref 21–32)
CREAT SERPL-MCNC: 2.75 MG/DL (ref 0.6–1.3)
CROSSMATCH: NORMAL
EOSINOPHIL # BLD AUTO: 1.02 THOUSAND/ΜL (ref 0–0.61)
EOSINOPHIL NFR BLD AUTO: 13 % (ref 0–6)
ERYTHROCYTE [DISTWIDTH] IN BLOOD BY AUTOMATED COUNT: 16.3 % (ref 11.6–15.1)
GFR SERPL CREATININE-BSD FRML MDRD: 21 ML/MIN/1.73SQ M
GLUCOSE SERPL-MCNC: 125 MG/DL (ref 65–140)
GLUCOSE SERPL-MCNC: 185 MG/DL (ref 65–140)
GLUCOSE SERPL-MCNC: 188 MG/DL (ref 65–140)
GLUCOSE SERPL-MCNC: 94 MG/DL (ref 65–140)
GLUCOSE SERPL-MCNC: 97 MG/DL (ref 65–140)
HCT VFR BLD AUTO: 29.3 % (ref 36.5–49.3)
HGB BLD-MCNC: 9.3 G/DL (ref 12–17)
IMM GRANULOCYTES # BLD AUTO: 0.03 THOUSAND/UL (ref 0–0.2)
IMM GRANULOCYTES NFR BLD AUTO: 0 % (ref 0–2)
LYMPHOCYTES # BLD AUTO: 0.82 THOUSANDS/ΜL (ref 0.6–4.47)
LYMPHOCYTES NFR BLD AUTO: 11 % (ref 14–44)
MAGNESIUM SERPL-MCNC: 1.4 MG/DL (ref 1.6–2.6)
MCH RBC QN AUTO: 25.4 PG (ref 26.8–34.3)
MCHC RBC AUTO-ENTMCNC: 31.7 G/DL (ref 31.4–37.4)
MCV RBC AUTO: 80 FL (ref 82–98)
MONOCYTES # BLD AUTO: 0.6 THOUSAND/ΜL (ref 0.17–1.22)
MONOCYTES NFR BLD AUTO: 8 % (ref 4–12)
NEUTROPHILS # BLD AUTO: 5.31 THOUSANDS/ΜL (ref 1.85–7.62)
NEUTS SEG NFR BLD AUTO: 68 % (ref 43–75)
NRBC BLD AUTO-RTO: 0 /100 WBCS
PHOSPHATE SERPL-MCNC: 2.8 MG/DL (ref 2.3–4.1)
PLATELET # BLD AUTO: 125 THOUSANDS/UL (ref 149–390)
PMV BLD AUTO: 11.3 FL (ref 8.9–12.7)
POTASSIUM SERPL-SCNC: 3.6 MMOL/L (ref 3.5–5.3)
RBC # BLD AUTO: 3.66 MILLION/UL (ref 3.88–5.62)
SODIUM SERPL-SCNC: 140 MMOL/L (ref 135–147)
TACROLIMUS BLD-MCNC: 3.5 NG/ML (ref 2–20)
UNIT DISPENSE STATUS: NORMAL
UNIT PRODUCT CODE: NORMAL
UNIT PRODUCT VOLUME: 350 ML
UNIT RH: NORMAL
WBC # BLD AUTO: 7.81 THOUSAND/UL (ref 4.31–10.16)

## 2022-10-12 PROCEDURE — 80048 BASIC METABOLIC PNL TOTAL CA: CPT | Performed by: INTERNAL MEDICINE

## 2022-10-12 PROCEDURE — 99232 SBSQ HOSP IP/OBS MODERATE 35: CPT | Performed by: INTERNAL MEDICINE

## 2022-10-12 PROCEDURE — 83735 ASSAY OF MAGNESIUM: CPT | Performed by: INTERNAL MEDICINE

## 2022-10-12 PROCEDURE — 99223 1ST HOSP IP/OBS HIGH 75: CPT | Performed by: INTERNAL MEDICINE

## 2022-10-12 PROCEDURE — 80197 ASSAY OF TACROLIMUS: CPT | Performed by: INTERNAL MEDICINE

## 2022-10-12 PROCEDURE — 85025 COMPLETE CBC W/AUTO DIFF WBC: CPT | Performed by: INTERNAL MEDICINE

## 2022-10-12 PROCEDURE — 82948 REAGENT STRIP/BLOOD GLUCOSE: CPT

## 2022-10-12 PROCEDURE — 99232 SBSQ HOSP IP/OBS MODERATE 35: CPT | Performed by: STUDENT IN AN ORGANIZED HEALTH CARE EDUCATION/TRAINING PROGRAM

## 2022-10-12 PROCEDURE — 84100 ASSAY OF PHOSPHORUS: CPT | Performed by: INTERNAL MEDICINE

## 2022-10-12 RX ORDER — ZOLPIDEM TARTRATE 5 MG/1
5 TABLET ORAL
Status: DISCONTINUED | OUTPATIENT
Start: 2022-10-12 | End: 2022-10-13 | Stop reason: HOSPADM

## 2022-10-12 RX ORDER — PREGABALIN 75 MG/1
75 CAPSULE ORAL 2 TIMES DAILY
Status: DISCONTINUED | OUTPATIENT
Start: 2022-10-12 | End: 2022-10-13 | Stop reason: HOSPADM

## 2022-10-12 RX ADMIN — INSULIN LISPRO 1 UNITS: 100 INJECTION, SOLUTION INTRAVENOUS; SUBCUTANEOUS at 21:13

## 2022-10-12 RX ADMIN — PREGABALIN 50 MG: 50 CAPSULE ORAL at 11:00

## 2022-10-12 RX ADMIN — SODIUM BICARBONATE 650 MG TABLET 650 MG: at 11:00

## 2022-10-12 RX ADMIN — HEPARIN SODIUM 5000 UNITS: 5000 INJECTION INTRAVENOUS; SUBCUTANEOUS at 05:42

## 2022-10-12 RX ADMIN — INSULIN LISPRO 1 UNITS: 100 INJECTION, SOLUTION INTRAVENOUS; SUBCUTANEOUS at 14:07

## 2022-10-12 RX ADMIN — HEPARIN SODIUM 5000 UNITS: 5000 INJECTION INTRAVENOUS; SUBCUTANEOUS at 21:11

## 2022-10-12 RX ADMIN — CEFTRIAXONE SODIUM 1000 MG: 10 INJECTION, POWDER, FOR SOLUTION INTRAVENOUS at 19:54

## 2022-10-12 RX ADMIN — CLOPIDOGREL BISULFATE 75 MG: 75 TABLET ORAL at 11:01

## 2022-10-12 RX ADMIN — TAMSULOSIN HYDROCHLORIDE 0.4 MG: 0.4 CAPSULE ORAL at 18:23

## 2022-10-12 RX ADMIN — ZOLPIDEM TARTRATE 5 MG: 5 TABLET, COATED ORAL at 21:15

## 2022-10-12 RX ADMIN — HEPARIN SODIUM 5000 UNITS: 5000 INJECTION INTRAVENOUS; SUBCUTANEOUS at 14:09

## 2022-10-12 RX ADMIN — INSULIN DETEMIR 15 UNITS: 100 INJECTION, SOLUTION SUBCUTANEOUS at 21:11

## 2022-10-12 RX ADMIN — TACROLIMUS 1 MG: 1 CAPSULE ORAL at 21:15

## 2022-10-12 RX ADMIN — ATORVASTATIN CALCIUM 80 MG: 80 TABLET, FILM COATED ORAL at 18:23

## 2022-10-12 RX ADMIN — SODIUM BICARBONATE 650 MG TABLET 650 MG: at 18:23

## 2022-10-12 RX ADMIN — PREGABALIN 75 MG: 75 CAPSULE ORAL at 18:23

## 2022-10-12 RX ADMIN — TACROLIMUS 1 MG: 1 CAPSULE ORAL at 11:01

## 2022-10-12 NOTE — RESTORATIVE TECHNICIAN NOTE
Restorative Technician Note      Patient Name: Hannah Duarte     Restorative Tech Visit Date: 10/12/2022  Note Type: Mobility  Patient Position Upon Consult: Supine  Activity Performed: Ambulated  Assistive Device: Other (Comment) (none)  Patient Position at End of Consult: Bedside chair;  All needs within [facility]

## 2022-10-12 NOTE — ASSESSMENT & PLAN NOTE
CT scan CAP: RT upper lobe 9mm, new since 2021; suspicious for metastatic disease  Patient currently follows with oncologist Dr Ryan Cisse, as he has history of bladder cancer  Would strongly recommend PET Scan as outpatient

## 2022-10-12 NOTE — ASSESSMENT & PLAN NOTE
Currently on Tacrolimus  Level yesterday was 3 0, therapeutic 3 0-8 0     PLAN:   - Continue with Tacrolimus

## 2022-10-12 NOTE — ASSESSMENT & PLAN NOTE
Moderate progressing dementia  At baseline needs assistance with extended ADLs  Patient is able to dress and eat by himself  Likely has a vascular component  CT head supportive with microvascular changes  Last MOCA assessment done in 3/2022 was 18/30  Also combination of medications can cause increasing cognitive decline  Was evaluated by Neuropsychiatry and found to not have capacity to make decisions       PLAN:   - Continue Ambien at 5 mg HS for 2 weeks to decrease risk of withdrawal and rebound symptoms d/c on 10/26/2022   - Temazepam discontinues however, recommend restarting Temzepam 15 mg HS, and consider discontinuing with long term taper    - Can consider TSH, Vitamin B12 and Thiamine levels with routine labs   - Recommend adding Seroquel 12 5 mg in evening to help with agitation if patient continues to stay inpatient

## 2022-10-12 NOTE — ASSESSMENT & PLAN NOTE
Was found to have leakage around tube came into IR for tube check and noted urine drainage had contained mucus  UA found to have pyuria  Urine cultures show mixed contaminants  Was empirically treated with Rocephin  Urology recommended IR consult for tube check-discussed with       PLAN:   - continue with Flomax   - Rocephin 1 g Q24hrs

## 2022-10-12 NOTE — CASE MANAGEMENT
Case Management Discharge Planning Note    Patient name Jaron Rizo  [facility] [id number]  : 1950 Date 10/12/2022       Current Admission Date: 10/8/2022  Current Admission Diagnosis:Fall   Patient Active Problem List    Diagnosis Date Noted   • Dementia ([place] [address] ) 10/12/2022   • Fall 10/08/2022   • Facial laceration 10/08/2022   • Chronic kidney disease (CKD), stage IV (severe) ([place] [address] ) 10/08/2022   • History of bladder cancer 10/08/2022   • History of liver transplant ([place] [address] ) 10/08/2022   • DM II (diabetes mellitus, type II), controlled ([place] [address] ) 10/08/2022   • Anemia 10/08/2022   • Thrombocytopenia ([place] [address] ) 10/08/2022   • Pulmonary nodule 10/08/2022   • Insomnia 10/08/2022   • Acute encephalopathy 10/08/2022   • History of hydronephrosis 10/08/2022      LOS (days): 4  Geometric Mean LOS (GMLOS) (days): 2 30  Days to GMLOS:-1 7     OBJECTIVE:  Risk of Unplanned Readmission Score: 20 11         Current admission status: Inpatient   Preferred Pharmacy:   PATIENT/FAMILY REPORTS NO PREFERRED PHARMACY  No address on file      Primary Care Provider: Josseline Wheeler DO    Primary Insurance: [facility] RE[facility]  Secondary Insurance:     DISCHARGE DETAILS:       Additional Comments: CM spoke with provider today  Plan is for tentative d/c in 48 hours  Neuropsych consult completed today and the patient was found to not have capacity at this time

## 2022-10-12 NOTE — ASSESSMENT & PLAN NOTE
Consider secondary d/t metabolic encephalopathy 2/2 UTI  Spouse does report mild cognitive impairment at baseline with increasing confusion, which started after patient had an aneurysm 7 years ago  Neuropsychiatry evaluated and found to not have capacity  S/p multiple falls and CVA  CT head (10/8) - "No acute intracranial abnormality  Stable chronic microangiopathic changes within the brain   No change since recent prior "  Pt's significant other has POA, she will bring in the document once found    PLAN:    - Continue with delirium precautions   - please see dementia plan for medication recommendations

## 2022-10-12 NOTE — PLAN OF CARE
Problem: Prexisting or High Potential for Compromised Skin Integrity  Goal: Skin integrity is maintained or improved  Description: INTERVENTIONS:  - Identify patients at risk for skin breakdown  - Assess and monitor skin integrity  - Assess and monitor nutrition and hydration status  - Monitor labs   - Assess for incontinence   - Turn and reposition patient  - Assist with mobility/ambulation  - Relieve pressure over bony prominences  - Avoid friction and shearing  - Provide appropriate hygiene as needed including keeping skin clean and dry  - Evaluate need for skin moisturizer/barrier cream  - Collaborate with interdisciplinary team   - Patient/family teaching  - Consider wound care consult   Outcome: Progressing     Problem: Potential for Falls  Goal: Patient will remain free of falls  Description: INTERVENTIONS:  - Educate patient/family on patient safety including physical limitations  - Instruct patient to call for assistance with activity   - Consult OT/PT to assist with strengthening/mobility   - Keep Call bell within reach  - Keep bed low and locked with side rails adjusted as appropriate  - Keep care items and personal belongings within reach  - Initiate and maintain comfort rounds  - Make Fall Risk Sign visible to staff  - Offer Toileting every 2 Hours, in advance of need  - Initiate/Maintain bed alarm  - Obtain necessary fall risk management equipment: - Apply yellow socks and bracelet for high fall risk patients  - Consider moving patient to room near nurses station  Outcome: Progressing

## 2022-10-12 NOTE — PROGRESS NOTES
Vitals:    10/11/22 2158   BP: 103/50   Pulse: 71   Resp:    Temp: 97 9 °F (36 6 °C)   SpO2: 98%       Lying in bed resting comfortably, reports is doing well and only complaint is would like to know the plan for the day, states 1 of the doctors told him yesterday somebody will be around to do testing for his liver acute does not remember with the testing was    Nursing reports patient has remained calm cooperative pleasant and easily redirected          Care coordination:  Rounded with Tyler Contreras (RN)

## 2022-10-12 NOTE — ASSESSMENT & PLAN NOTE
Hb is 9 7 s/p 1 unit of pRBC  Patient has no acute bleeding  Iron panel showed iron deficiency     Lab Results   Component Value Date    IRON 26 (L) 10/11/2022    TIBC 134 (L) 10/11/2022    FERRITIN 533 (H) 10/11/2022     PLAN:   - Continue to monitor with CBC   - Consider additional transfusion if patient HB < 7

## 2022-10-12 NOTE — CONSULTS
Consultation - Neuropsychology/Psychology Department  Mackenzie Juan 74 y o  male MRN: [id number]  Unit/Bed#: [facility] 926-01 Encounter: [id number]        Reason for Consultation:  Mackenzie Juan is a 74y o  year old male who was referred for a Neuropsychological exam to assess cognitive functioning and comment on capacity to make informed medical decisions        History of Present Illness  Admitted due to fall    Physician Requesting Consult: Neal Dill MD    PROBLEM LIST:  Patient Active Problem List   Diagnosis   • Fall   • Facial laceration   • Chronic kidney disease (CKD), stage IV (severe) ([facility] )   • History of bladder cancer   • History of liver transplant ([place] [address] )   • DM II (diabetes mellitus, type II), controlled ([place] [address] )   • Anemia   • Thrombocytopenia ([facility])   • Pulmonary nodule   • Insomnia   • Acute encephalopathy   • History of hydronephrosis   • Dementia ([place] [address] )         Historical Information   Past Medical History:   Diagnosis Date   • Hx of liver transplant ([place] [address] )    • Stroke (cerebrum) ([place] [address] )      Past Surgical History:   Procedure Laterality Date   • CHOLECYSTECTOMY       Social History   Social History     Substance and Sexual Activity   Alcohol Use Not Currently     Social History     Substance and Sexual Activity   Drug Use Not on file     Social History     Tobacco Use   Smoking Status Former Smoker   Smokeless Tobacco Never Used     Family History:   Family History   Problem Relation Age of Onset   • Hypertension Mother        Meds/Allergies   current meds:   Current Facility-Administered Medications   Medication Dose Route Frequency   • acetaminophen (TYLENOL) tablet 650 mg  650 mg Oral Q6H PRN   • aluminum-magnesium hydroxide-simethicone (MYLANTA) oral suspension 30 mL  30 mL Oral Q6H PRN   • atorvastatin (LIPITOR) tablet 80 mg  80 mg Oral After Dinner   • ceftriaxone (ROCEPHIN) 1 g/50 mL in dextrose IVPB  1,000 mg Intravenous Q24H   • clopidogrel (PLAVIX) tablet 75 mg  75 mg Oral Daily   • heparin (porcine) subcutaneous injection 5,000 Units  5,000 Units Subcutaneous Q8H [address]   • insulin detemir (LEVEMIR) subcutaneous injection 15 Units  15 Units Subcutaneous HS   • insulin lispro (HumaLOG) 100 units/mL subcutaneous injection 1-5 Units  1-5 Units Subcutaneous HS   • insulin lispro (HumaLOG) 100 units/mL subcutaneous injection 1-6 Units  1-6 Units Subcutaneous TID AC   • loperamide (IMODIUM) capsule 2 mg  2 mg Oral BID PRN   • ondansetron (ZOFRAN) injection 4 mg  4 mg Intravenous Q6H PRN   • oxyCODONE (ROXICODONE) IR tablet 2 5 mg  2 5 mg Oral Q4H PRN   • pregabalin (LYRICA) capsule 75 mg  75 mg Oral BID   • sodium bicarbonate tablet 650 mg  650 mg Oral BID after meals   • tacrolimus (PROGRAF) capsule 1 mg  1 mg Oral Q12H [address]   • tamsulosin (FLOMAX) capsule 0 4 mg  0 4 mg Oral After Dinner   • zolpidem (AMBIEN) tablet 5 mg  5 mg Oral HS PRN       Not on File      Family and Social Support:   No data recorded    Behavioral Observations: Alert, UNABLE to accurately state the season of year, month, day/week, day/month; patient appeared aware of reason for hospitalization but was unable to recall his medical history; denied depressed mood and anxiety;     Cognitive Examination    General Cognitive Functioning MMSE = Deficits in orientation, recall and working memory    Attention/Concentration Auditory Selective Attention = Average; Auditory Vigilance = Within Normal Limits; Information Processing Speed = Mildly Impaired    Frontal Systems/Executive Functioning Mental Flexibility/Cognitive Control = Impaired; Working Memory = Impaired Abstract Reasoning = Borderline; Generative Ability = Impaired, Commonsense Reasoning and Judgement = Impaired    Language Functioning Confrontation naming = Within Normal Limits, Phonemic Fluency = Impaired; Semantic Retrieval = Impaired; Comprehension of Complex Ideational Material = Impaired;     Memory Functioning Narrative Recall - Short Delay = Impaired;  Long Delay Narrative Recall = Impaired; Three word recall = Impaired    Visuo-Spatial Abilities Not Assessed    Functional Knowledge  Health & Safety Knowledge = Impaired;     Summary/Impression:  Results of Neuropsychological Exam revealed diffuse cognitive dysfunction and on a measure assessing awareness of personal health status and ability to evaluate health problems, handle medical emergencies and take safety precautions, patient performed in the IMPAIRED range of functioning  At this time, patient does not appear to have capacity to make fully informed medical decisions  Dementia, Unspecified without behavioral disturbances

## 2022-10-12 NOTE — ASSESSMENT & PLAN NOTE
Patient has insomnia and has multiple nighttime medications     Home medications include: Restoril 30 mg HS PRN and Ambien 10 mg HS PRN     Plan:   - Restoril was discontinued and Ambien restarted at 5 mg HS PRN

## 2022-10-12 NOTE — ASSESSMENT & PLAN NOTE
Lab Results   Component Value Date    EGFR 21 10/12/2022    EGFR 18 10/11/2022    EGFR 17 10/10/2022    CREATININE 2 75 (H) 10/12/2022    CREATININE 3 10 (H) 10/11/2022    CREATININE 3 32 (H) 10/10/2022     Acute injury on CKD  Patient creatinine is improving       PLAN:   - Continue to monitor with BMP   - Be judicious with nephrotoxic medications   - Lyrica adjusted from 50 mg TID to 75 mg BID due to half life and renal dosage recommendation  - Recommend decreasing insulin dosage or Endocrine consult

## 2022-10-12 NOTE — CONSULTS
Consultation - Geriatric Medicine   Deb Loya 74 y o  male MRN: [id number]  Unit/Bed#: [facility] 926-01 Encounter: [id number]      Assessment/Plan     Dementia [facility])  Assessment & Plan  Moderate progressing dementia  At baseline needs assistance with extended ADLs  Patient is able to dress and eat by himself  Likely has a vascular component  CT head supportive with microvascular changes  Last MOCA assessment done in 3/2022 was 18/30  Also combination of medications can cause increasing cognitive decline  PLAN:   - Recommend restart Ambien at 5 mg HS for 2 weeks to decrease risk of withdrawal and rebound symptoms d/c on 10/26/2022   - Recommend Temzepam 15 mg HS, however if patient able to should consider discontinuing with long term taper    - Consider adding Melatonin 3 mg HS for insomnia  - Can consider TSH, Vitamin B12 and Thiamine levels with routine labs     Acute encephalopathy  Assessment & Plan  Consider secondary d/t metabolic encephalopathy 2/2 UTI  Spouse does report mild cognitive impairment at baseline with increasing confusion, which started after patient had an aneurysm 7 years ago  Capacity is questionable, neuropsychiatry is involved to assess as per primary team    S/p multiple falls and CVA  CT head (10/8) - "No acute intracranial abnormality  Stable chronic microangiopathic changes within the brain  No change since recent prior "  Pt's significant other has POA, she will bring in the document once found  Discontinued Ambien and decreased Restoril     PLAN:    - Continue with treatment of UTI   - Continue with delirium precautions   - please see dementia plan for medication recommendations    * Fall  Assessment & Plan  Likely secondary to orthostatic hypertension  However, patient is on multiple medications that can alter mentation including Ambien and Restoril  Orthostatics on 10/9 were positive  Have already discontinued Ambien and Restoril was decreased     Does have a facial laceration on right forehead above eye  PLAN:   - Due to age and history would discontinue Restoril as it can cause increased risk of delirium  - Repeat Orthostatics   - Patient is on Tramadol periodically at home, will not restart as can contribute to dementia progression  Patient is not compliant with the medication  Currently on Oxycodone 2 5 mg Q4hrs for pain and well controlled  Continue with Oxycodone  Insomnia  Assessment & Plan  Patient has insomnia and has multiple nighttime medications  Home medications include: Restoril 30 mg HS PRN and Ambien 10 mg HS PRN     Plan:   - have already decreased Restoril 15 mg and Ambien is discontinued   - Consider adding Melatonin 3 mg HS     DM II (diabetes mellitus, type II), controlled [facility])  Assessment & Plan  Last HBA1c (8/22): 6 8      Recent Labs     10/11/22  1122 10/11/22  1643 10/11/22  2040 10/12/22  0809   POCGLU 110 232* 131 125     Currently on Levemir 15 Units HS  PLAN:   - Should consider decreasing Levemir due to CKD and decrease insulin clearance  Should consider Endocrinology consult  - On discharge should decrease Levemir, as patient is more likely to have hypoglycemic episodes due to incorrect administration    - Consider decreasing ISS algorithm from 3 to 2 due to CKD     History of hydronephrosis  Assessment & Plan  Was found to have leakage around tube came into IR for tube check and noted urine drainage had contained mucus  UA found to have pyuria  Urine cultures show mixed contaminants  Was empirically treated with Rocephin  Urology recommended IR consult for tube check-discussed with  PLAN:   - continue with Flomax   - can discontinue Rocephin     Pulmonary nodule  Assessment & Plan  CT scan CAP: RT upper lobe 9mm, new since 2021; suspicious for metastatic disease  Patient currently follows with oncologist Dr Nhan Parra, as he has history of bladder cancer  Would strongly recommend PET Scan as outpatient      Anemia  Assessment & Plan  Hb is 9 3 s/p 1 unit of pRBC improved from 6 8  Patient has no acute bleeding  Iron panel showed iron deficiency  Lab Results   Component Value Date    IRON 26 (L) 10/11/2022    TIBC 134 (L) 10/11/2022    FERRITIN 533 (H) 10/11/2022     PLAN:   - Continue to monitor with CBC   - Consider additional transfusion if patient HB < 7     History of liver transplant [facility])  Assessment & Plan  Currently on Tacrolimus, continue  Plan to check morning level prior to next dose  Chronic kidney disease (CKD), stage IV (severe) [facility])  Assessment & Plan  Lab Results   Component Value Date    EGFR 21 10/12/2022    EGFR 18 10/11/2022    EGFR 17 10/10/2022    CREATININE 2 75 (H) 10/12/2022    CREATININE 3 10 (H) 10/11/2022    CREATININE 3 32 (H) 10/10/2022     Acute injury on CKD  Patient creatinine is improving  PLAN:   - Continue to monitor with BMP   - Be judicious with nephrotoxic medications   - Recommend decreasing insulin dosage or Endocrine consult   - Recommend adjusting Lyrica from 50 mg TID to 75 mg BID due to half life       Home medication review    Personally confirmed with patient's spouse    Plavix 75 mg tablet daily   Levemir 15 units HS   Imodium 2 mg BID PRN   Lyrica 50 mg TID   Crestor 40 mg daily   Prograf 1 mg capsule Q12 hrs   Flomax 0 4 mg daily   Restoril 30 mg HS PRN   Ambien 10 mg HS PRN   Tramadol 50 mg Q8 hrs     History of Present Illness   Physician Requesting Consult: Gretchen Cano MD  Reason for Consult / Principal Problem: Acute encephalopathy   Hx and PE limited by:  Mild cognitive impairment   Additional history obtained from: Chart review and patient evaluation      HPI: Xena Ribera is a 74y o  year old male with past medical history type 2 diabetes on long-term insulin, CKD 4, cancer of the bladder, history of CVA, mild cognitive impairment, and status post liver transplant image to the hospital status post fall  He had fallen out of bed and hit the right side of his head  There was no loss of consciousness or prodromal symptoms at the time  Patient denies any chest or shortness of breath  Patient has history of multiple falls, which were attributed to orthostatic hypotension  At last hospitalization patient was recommended for short-term rehabilitation facility however signed out AMA  He did have a facial laceration, however was cleared for trauma  Imaging did not show any acute processes  His spouse reports that he has been more confused than usual and not eating well and unable to care for himself for the past week  He had initially presented on Friday but left AMA from the emergency room as he didn't want to wait  Wife reports that he is able to feed and dress himself, but cannot do household tasks and financials which are all handled by wife  Patient has been increasing declining in cognition in the past few years  Patient does wear dentures on top and bottom, however does not have wear glasses or hearing aids  He also does not use a walker or a cane at home to ambulate  Today patient was seen and examined at bedside  On examination patient appeared well  He was alert and oriented to person, place and time  He was confused about recent events that happened last week  However, he was able to have fluid conversation  He reports he is feeling well and his physical therapy has been going well       Inpatient consult to Gerontology  Consult performed by: Mahala Moritz, MD  Consult ordered by: Lex Barreto MD      Review of Systems   General ROS: negative  Psychological ROS: negative  Hematological and Lymphatic ROS: negative  Respiratory ROS: no cough, shortness of breath, or wheezing  Cardiovascular ROS: no chest pain or dyspnea on exertion  Gastrointestinal ROS: no abdominal pain, change in bowel habits, or black or bloody stools  Musculoskeletal ROS: negative  Neurological ROS: negative  Dermatological ROS: negative      Historical Information   No past medical history on file  No past surgical history on file  Social History   Social History     Substance and Sexual Activity   Alcohol Use Not on file     Social History     Substance and Sexual Activity   Drug Use Not on file     Social History     Tobacco Use   Smoking Status Not on file   Smokeless Tobacco Not on file     Family History: No history of dementia in family  Reviewed family history in other chart       Meds/Allergies   all current active meds have been reviewed, current meds:   Current Facility-Administered Medications   Medication Dose Route Frequency   • acetaminophen (TYLENOL) tablet 650 mg  650 mg Oral Q6H PRN   • aluminum-magnesium hydroxide-simethicone (MYLANTA) oral suspension 30 mL  30 mL Oral Q6H PRN   • atorvastatin (LIPITOR) tablet 80 mg  80 mg Oral After Dinner   • ceftriaxone (ROCEPHIN) 1 g/50 mL in dextrose IVPB  1,000 mg Intravenous Q24H   • clopidogrel (PLAVIX) tablet 75 mg  75 mg Oral Daily   • heparin (porcine) subcutaneous injection 5,000 Units  5,000 Units Subcutaneous Q8H [address]   • insulin detemir (LEVEMIR) subcutaneous injection 15 Units  15 Units Subcutaneous HS   • insulin lispro (HumaLOG) 100 units/mL subcutaneous injection 1-5 Units  1-5 Units Subcutaneous HS   • insulin lispro (HumaLOG) 100 units/mL subcutaneous injection 1-6 Units  1-6 Units Subcutaneous TID AC   • loperamide (IMODIUM) capsule 2 mg  2 mg Oral BID PRN   • ondansetron (ZOFRAN) injection 4 mg  4 mg Intravenous Q6H PRN   • oxyCODONE (ROXICODONE) IR tablet 2 5 mg  2 5 mg Oral Q4H PRN   • pregabalin (LYRICA) capsule 75 mg  75 mg Oral BID   • sodium bicarbonate tablet 650 mg  650 mg Oral BID after meals   • tacrolimus (PROGRAF) capsule 1 mg  1 mg Oral Q12H JUNIOR   • tamsulosin (FLOMAX) capsule 0 4 mg  0 4 mg Oral After Dinner   • temazepam (RESTORIL) capsule 15 mg  15 mg Oral HS PRN   • zolpidem (AMBIEN) tablet 5 mg  5 mg Oral HS PRN    and PTA meds:   Prior to Admission Medications   Prescriptions Last Dose Informant Patient Reported? Taking? clopidogrel (PLAVIX) 75 mg tablet   Yes Yes   Sig: Take 75 mg by mouth daily   insulin detemir (LEVEMIR) 100 units/mL subcutaneous injection   Yes Yes   Sig: Inject 15 Units under the skin daily at bedtime   loperamide (IMODIUM) 2 mg capsule   Yes Yes   Sig: Take 2 mg by mouth 2 (two) times a day as needed for diarrhea   pregabalin (LYRICA) 50 mg capsule   Yes Yes   Sig: Take 50 mg by mouth 3 (three) times a day   rosuvastatin (CRESTOR) 40 MG tablet   Yes Yes   Sig: Take 40 mg by mouth daily   sodium chloride 0 9 % SOLN   Yes Yes   Sig: Inject 10 mL into a catheter in a vein in the morning Flush through PCN catheter daily   tacrolimus (PROGRAF) 1 mg capsule   Yes Yes   Sig: Take 1 mg by mouth every 12 (twelve) hours   tamsulosin (Flomax) 0 4 mg   Yes Yes   Sig: Take 0 4 mg by mouth daily with dinner   temazepam (RESTORIL) 7 5 mg capsule   Yes Yes   Sig: Take 30 mg by mouth daily at bedtime as needed for sleep   zolpidem (Ambien) 10 mg tablet   Yes Yes   Sig: Take 10 mg by mouth daily at bedtime as needed for sleep      Facility-Administered Medications: None       Not on File    Objective     Intake/Output Summary (Last 24 hours) at 10/12/2022 1152  Last data filed at 10/12/2022 0223  Gross per 24 hour   Intake 425 ml   Output 675 ml   Net -250 ml     Invasive Devices  Report    Central Venous Catheter Line  Duration           Port A Cath Right Subclavian -- days          Drain  Duration           Percutaneous Nephroureteral Tube (PCNU) Right 1 -- days                Physical Exam  Vitals and nursing note reviewed  Constitutional:       Appearance: He is well-developed  Comments: Clinical Fragility scale: Living with moderate fragility    HENT:      Head: Normocephalic  Comments: Right forehead laceration with sutures  Well healing without any blood or drainage     Eyes:      Conjunctiva/sclera: Conjunctivae normal    Cardiovascular:      Rate and Rhythm: Normal rate and regular rhythm  Heart sounds: No murmur heard  Pulmonary:      Effort: Pulmonary effort is normal  No respiratory distress  Breath sounds: Normal breath sounds  Abdominal:      General: Abdomen is flat  Bowel sounds are normal       Palpations: Abdomen is soft  Tenderness: There is no abdominal tenderness  Musculoskeletal:         General: No swelling or tenderness  Cervical back: Neck supple  Skin:     General: Skin is warm and dry  Capillary Refill: Capillary refill takes less than 2 seconds  Neurological:      Mental Status: He is alert and oriented to person, place, and time  He is confused  Motor: Weakness (Right leg weakness 4/5 ) present  No tremor  Lab Results:   I have personally reviewed pertinent lab results including the following:  Lab Results   Component Value Date    WBC 7 81 10/12/2022    HGB 9 3 (L) 10/12/2022    HCT 29 3 (L) 10/12/2022    MCV 80 (L) 10/12/2022     (L) 10/12/2022     Lab Results   Component Value Date    SODIUM 140 10/12/2022    K 3 6 10/12/2022     (H) 10/12/2022    CO2 21 10/12/2022    BUN 41 (H) 10/12/2022    CREATININE 2 75 (H) 10/12/2022    GLUC 97 10/12/2022    CALCIUM 8 2 (L) 10/12/2022     Lab Results   Component Value Date    CALCIUM 8 2 (L) 10/12/2022    PHOS 2 8 10/12/2022     Lab Results   Component Value Date    IRON 26 (L) 10/11/2022    TIBC 134 (L) 10/11/2022    FERRITIN 533 (H) 10/11/2022       I have personally reviewed the following imaging study reports in PACS:    CT head (10/8):  "No acute intracranial abnormality  Stable chronic microangiopathic changes within the brain  No change since recent prior  "    CT spine cervical (10/8) - "No cervical spine fracture or traumatic malalignment "    CT CAP w/ contrast (10/8) -   " 1  No evidence of traumatic injury in the chest, abdomen or pelvis    2   Right upper lobe solid nodule measuring 9 mm, new since 2021, suspicious for metastatic disease    3  Right percutaneous nephroureterostomy in place with mild right hydronephrosis and a focus of air in the right renal collecting system, similar to the prior study  Delayed right nephrogram   4   Stable diffuse urinary bladder wall thickening with a focus of air in the urinary bladder, likely related to indwelling stent "     XR trauma multiple (10/8)- "No acute cardiopulmonary disease within limitations of supine imaging "    Personal interpretation of imaging studies mentioned above:    No acute trauma processes     Therapies:   PT: Consulted, recommendations include Home with outpatient rehabilitation  OT: Consulted, recommendations include Home with outpatient rehabilitation    VTE Prophylaxis: Sequential compression device (Venodyne)  and Heparin    Code Status: Level 1 - Full Code  Advance Directive and Living Will:  None on file    Power of :  None on File, however wife does have power of    POLST:   There is no POLST form on file for this patient (pre-hospital)    Family and Social Support:  Patient lives at home at with his wife and daughter  His wife helps him with fiances and medications  Wife does sound overwhelmed during conversation as she does have two other dependents, including elderly mother  Goals of Care: Pain free and able to ambulate without risk of fall         Marita Perez MD  Family Medicine PGY 2   10/12/22

## 2022-10-12 NOTE — PROGRESS NOTES
[address] [facility]  Progress Note - Sindy Sage 1950, 74 y o  male MRN: [id number]  [facility] Encounter: [id number]  Primary Care Provider: Garth Moraes DO   Date and time admitted to hospital: 10/8/2022  2:12 PM      * Status post fall  Assessment & Plan  Hx of multiple falls for which he has been admitted multiple times  Had been attributed to orthostatic hypotension  Suspected failure to thrive - unable to care for himself  Goal was to place in SNF on last admission however pt ends up signing out AMA  Unclear of medical decision capacity, thus awaiting neuropsychology evaluation  S/p pain CT imaging without evidence of acute fractures/dislocations  Resulting facial laceration s/p suturing in the ED  Supportive care  Fall precautions  Continued Ambien but discontinued Restoril for insomnia  PT/OT evaluation appreciated -> recommending home health services once medically stable  Orthostatics on 10/9 were positive - continue IV fluids in limit/avoid offending medications (if present) -> patient seen ambulating up and down room over the last few days without any further dizziness  Possible UTI also likely contributory - see below  Appreciate geriatrics input -> decreased Lyrica to 75 mg BID (renally dosed)    History of hydronephrosis  Assessment & Plan  S/p right percutaneous nephrostomy tube  Per his significant other, she states it was leaking around the tube and pt had come into IR for tube check recently  She states she has noticed urine drainage had contained mucus    Presenting UA with pyuria   Urine culture growing mixed contaminants - in light of encephalopathy and leukocytosis, will complete antibiotic course - currently on IV Rocephin  Urology recommended IR consult for tube check -> will involve IR if creatinine does not progressively downtrend  Continue Flomax     Acute metabolic encephalopathy  Assessment & Plan  Underlying cognitive impairment as confirmed per his significant other   Pt w/ prior hx of CVA  Worsening question if due to metabolic encephalopathy due to UTI  Appreciate neuropsychology input -> patient deemed not competent to make own medical decisions  Patient's significant other is POA    Anemia of chronic disease  Assessment & Plan  Monitor hemoglobin - hemoglobin improved from 6 8 -> 9 3 today status post PRBC transfusion yesterday     Insomnia  Assessment & Plan   On both Restoril and Ambien at home (verified via PDMP per previous provider) -> high fall risk on these two agents combined  Continue home Ambien - avoid additional agents (discontinued Restoril)     Pulmonary nodule  Assessment & Plan  RT upper lobe 9mm, new since 2021; suspicious for metastatic disease  Will need arrangement for PET scan as outpt  Pt to f/u with outpatient oncologist (Dr Yash Sharpe)  Communicated results and need for follow up with his significant other     Thrombocytopenia  Assessment & Plan  Chronic issue   Monitor platelet count - monitor for bleeding     Diabetes mellitus type 2 with neuropathy  Assessment & Plan  No results found for: HGBA1C  Continue basal insulin w/ additional SSI coverage per Accu-Cheks  Continue Lyrica for neuropathy (renally dosed)    History of liver transplant  Assessment & Plan  Continue Tacrolimus - serum level noted this morning     History of bladder cancer  Assessment & Plan  S/p chemotherapy/radiation  Follows outpatient oncology and palliative care     Acute kidney injury superimposed on chronic kidney disease stage 4  Assessment & Plan  Baseline creatinine approximately 2 8-2 9 - improved back to baseline today at 2 75 from 3 1 yesterday and from a peak of 3 32 previously  Appreciated nephrology input  Monitor renal function and urine output - limit/avoid nephrotoxins if possible - avoid hypotension as possible  H/o hydronephrosis s/p right PCN placement noted - last tube check on 10/7 per previous provider's documentation      DVT Prophylaxis:  Heparin SC       Patient Centered Rounds:  I have performed bedside rounds and discussed plan of care with nursing today  Discussions with Specialists or Other Care Team Provider:  see above assessments if applicable    Education and Discussions with Family / Patient:  Patient, along with family members, at bedside today    Time Spent for Care:  32 minutes  More than 50% of total time spent on counseling and coordination of care as described above  Current Length of Stay: 4 day(s)  Current Patient Status: Inpatient   Certification Statement:  Patient will continue to require additional hospital stay due to assessments as noted above  Code Status: Level 1 - Full Code        Subjective:     Seen/examined earlier today  Is relatively pleasant today  States he is walking around his room more today  Denies dizziness at this time  Objective:     Vitals:   Temp (24hrs), Av 9 °F (36 6 °C), Min:97 9 °F (36 6 °C), Max:97 9 °F (36 6 °C)    Temp:  [97 9 °F (36 6 °C)] 97 9 °F (36 6 °C)  HR:  [71-84] 84  Resp:  [19] 19  BP: (103-120)/(50-68) 120/68  SpO2:  [98 %-99 %] 99 %  Body mass index is 23 43 kg/m²  Input and Output Summary (last 24 hours):        Intake/Output Summary (Last 24 hours) at 10/12/2022 1940  Last data filed at 10/12/2022 1918  Gross per 24 hour   Intake 240 ml   Output 575 ml   Net -335 ml       Physical Exam:     GENERAL:  Improving weakness/fatigue  HEAD:  Normocephalic - right temporal laceration sutured  EYES: PERRL - EOMI   MOUTH:  Mucosa moist  NECK:  Supple - full range of motion  CARDIAC:  Rate controlled - S1/S2 positive  PULMONARY:  Fairly clear to auscultation - nonlabored respirations  ABDOMEN:  Soft - nontender/nondistended - active bowel sounds - right PCN tube in place  MUSCULOSKELETAL:  Motor strength/range of motion intact currently  NEUROLOGIC:  Baseline cognitive impairment present  SKIN:  Chronic wrinkles/blemishes - various skin tags on back  PSYCHIATRIC:  Mood/affect pleasant      Additional Data:     Labs & Recent Cultures:    Results from last 7 days   Lab Units 10/12/22  0541   WBC Thousand/uL 7 81   HEMOGLOBIN g/dL 9 3*   HEMATOCRIT % 29 3*   PLATELETS Thousands/uL 125*   NEUTROS PCT % 68   LYMPHS PCT % 11*   MONOS PCT % 8   EOS PCT % 13*     Results from last 7 days   Lab Units 10/12/22  0541 10/11/22  0614 10/10/22  0516   POTASSIUM mmol/L 3 6   < > 4 2   CHLORIDE mmol/L 114*   < > 110*   CO2 mmol/L 21   < > 22   BUN mg/dL 41*   < > 44*   CREATININE mg/dL 2 75*   < > 3 32*   CALCIUM mg/dL 8 2*   < > 8 1*   ALK PHOS U/L  --   --  74   ALT U/L  --   --  13   AST U/L  --   --  14    < > = values in this interval not displayed       Results from last 7 days   Lab Units 10/08/22  1509   INR  1 14     Results from last 7 days   Lab Units 10/12/22  1700 10/12/22  1156 10/12/22  0809 10/11/22  2040 10/11/22  1643 10/11/22  1122 10/11/22  0804 10/10/22  2051 10/10/22  1657 10/10/22  1147 10/10/22  0739 10/09/22  2110   POC GLUCOSE mg/dl 94 188* 125 131 232* 110 90 203* 176* 224* 104 199*         Results from last 7 days   Lab Units 10/08/22  1509   PROCALCITONIN ng/ml 0 15         Results from last 7 days   Lab Units 10/09/22  0929   URINE CULTURE  80,000-89,000 cfu/ml          Lines/Drains:  Invasive Devices  Report    Central Venous Catheter Line  Duration           Port A Cath Right Subclavian -- days          Drain  Duration           Percutaneous Nephroureteral Tube (PCNU) Right 1 -- days                  Last 24 Hours Medication List:   Current Facility-Administered Medications   Medication Dose Route Frequency Provider Last Rate   • acetaminophen  650 mg Oral Q6H PRN Maru Carreon DO     • aluminum-magnesium hydroxide-simethicone  30 mL Oral Q6H PRN Josie Mora DO     • atorvastatin  80 mg Oral After Dinner Josie Mora DO     • cefTRIAXone  1,000 mg Intravenous Q24H Josie Mora DO 1,000 mg (10/11/22 1926)   • clopidogrel  75 mg Oral Daily Ahmet Hyde, DO     • heparin (porcine)  5,000 Units Subcutaneous [facility] hAmet Hyde DO     • insulin detemir  15 Units Subcutaneous HS Ashley Carreon, DO     • insulin lispro  1-5 Units Subcutaneous HS Ahmet Hyde, DO     • insulin lispro  1-6 Units Subcutaneous TID AC Ashley Carreon, DO     • loperamide  2 mg Oral BID PRN Ahmet Hyde DO     • ondansetron  4 mg Intravenous Q6H PRN Ahmet Hyde DO     • oxyCODONE  2 5 mg Oral Q4H PRN Reggie Loving MD     • pregabalin  75 mg Oral BID Merary Baez MD     • sodium bicarbonate  650 mg Oral BID after meals Trever Hyde MD     • tacrolimus  1 mg Oral Q12H [address] Ahmet Hyde DO     • tamsulosin  0 4 mg Oral After Nicol Allred DO     • zolpidem  5 mg Oral HS PRN Merary Baez MD                    ** Please Note: This note is constructed using a voice recognition dictation system  An occasional wrong word/phrase or “sound-a-like” substitution may have been picked up by dictation device due to the inherent limitations of voice recognition software  Read the chart carefully and recognize, using reasonable context, where substitutions may have occurred  **

## 2022-10-12 NOTE — RESTORATIVE TECHNICIAN NOTE
Restorative Technician Note      Patient Name: Madelaine Perez     Restorative Tech Visit Date: 10/12/2022  Note Type: Mobility  Patient Position Upon Consult: Supine  Activity Performed: Ambulated  Assistive Device: Other (Comment) (none)  Patient Position at End of Consult: Bedside chair;  All needs within [facility]

## 2022-10-12 NOTE — ASSESSMENT & PLAN NOTE
Likely secondary to orthostatic hypertension  However, patient was on multiple medications that can alter mentation including Ambien and Restoril  Orthostatics on 10/9 were positive  PLAN:   - Repeat Orthostatics   - Patient is on Tramadol periodically at home, will not restart as can contribute to dementia progression  Patient is not compliant with the medication  Currently on Oxycodone 2 5 mg Q4hrs for pain and well controlled  Continue with Oxycodone

## 2022-10-12 NOTE — PROGRESS NOTES
NEPHROLOGY PROGRESS NOTE   Margarette Palmer 74 y o  male MRN: [id number]  Unit/Bed#: [facility] 926-01 Encounter: [id number]  Reason for Consult:     ASSESSMENT AND PLAN:  75 yo M with PMH of DM, bladder cancer, with right-sided hydronephrosis s/p PCN, CKD G4, cirrhosis s/p liver transplant  presents after a fall  Nephrology is consulted for management of CARLOTTA     PLAN:     #Non-Oliguric KDIGO CARLOTTA stage 1 on CKD G4 with evidence of kidney recovery  · Etiology:  Multifactorial likely secondary to prerenal , severe hypotension in the setting of poor oral intake  Complicated with hypoperfusion in the settings of anemia  Patient received IV contrast on 10/8 anticipate recurrent CARLOTTA  · Baseline creatinine:  New baseline after CARLOTTA 2 8-2 9 mg/dL  · Peak creatinine:  3 32 mg/dL  · Current creatinine:  2 75 mg/dL, back to baseline  · UA:  Hematuria, leukocyturia  · Renal imaging :  CT with contrast mild hydronephrosis, right renal atrophy  · Treatment:  · No indication of urgent dialysis  · Maintain MAP:  Over 65 mmHg if possible/avoid hypoperfusion:  Hold parameters on blood pressure medications  · Avoid nephrotoxic agents such as NSAIDs, and IV contrast if possible   Avoid opioids   · Adjust medications to GFR  · Patient can continue follow-up with Dr Jeremy Langston on discharge (has appointment in December)     #CKD:  · Baseline creatinine:  2 8-2 9 mg/dL, new baseline after recent CARLOTTA  · Etiology:  Likely secondary to obstructive uropathy complicated with nephrosclerosis and nephron loss with atrophic kidney        #Acid-base Disorder  · serum HCO3 21 millimoles per L  · A  · Continue sodium bicarb 650 mg b i d      #Volume status/hypertension:  · Volume:   euvolemic  · Blood pressure:   normotensive /68mmhg, goal< 140/90  · Recommend:  · Low-sodium diet  · Enforce fluid intake  · Discontinue IV fluids      # liver transplant  · Tacrolimus 1 mg b i d   · Monitor levels before morning dose      #Anemia:  · Current hemoglobin:  9 3 mg/dL  · Treatment:  · Transfuse for hemoglobin less than 7 0 per primary service       # encephalopathy/fall  · Likely secondary to UTI  · Management as per primary team  · Seems to be back to baseline    Kidney function at baseline  Nephrology will sign off at this time  Thank you for involving us in this case  Please call us if any question  SUBJECTIVE:  Patient seen and examined at bedside  No chest pain, shortness of breath, nausea, vomiting, abdominal pain or diarrhea  No urinary complaints  OBJECTIVE:  Current Weight: Weight - Scale: 60 kg (132 lb 4 4 oz)  Vitals:    10/12/22 0810   BP: 120/68   Pulse: 84   Resp: 19   Temp: 97 9 °F (36 6 °C)   SpO2: 99%       Intake/Output Summary (Last 24 hours) at 10/12/2022 1021  Last data filed at 10/12/2022 7638  Gross per 24 hour   Intake 425 ml   Output 675 ml   Net -250 ml     Wt Readings from Last 3 Encounters:   10/08/22 60 kg (132 lb 4 4 oz)     Temp Readings from Last 3 Encounters:   10/12/22 97 9 °F (36 6 °C)     BP Readings from Last 3 Encounters:   10/12/22 120/68     Pulse Readings from Last 3 Encounters:   10/12/22 84        General:  Cachectic, no acute distress at this time  Skin:  No acute rash  Eyes:  No scleral icterus and noninjected  ENT:  mucous membranes moist  Neck:  no carotid bruits  Chest:  Clear to auscultation percussion, good respiratory effort, no use of accessory respiratory muscles    Right central port  CVS:  Regular rate and rhythm without rub  Abdomen:  soft and nontender   Extremities:  No clubbing, no cyanosis, no significant lower extremity edema  Neuro:  No gross focality  Psych:  Alert , cooperative   :  Right PCN    Medications:    Current Facility-Administered Medications:   •  acetaminophen (TYLENOL) tablet 650 mg, 650 mg, Oral, Q6H PRN, Micha Herr, , 650 mg at 10/09/22 2114  •  aluminum-magnesium hydroxide-simethicone (MYLANTA) oral suspension 30 mL, 30 mL, Oral, Q6H PRN, Sarah Spaulding Hollace Bernheim, DO  •  atorvastatin (LIPITOR) tablet 80 mg, 80 mg, Oral, After Thelda Wyatt, DO, 80 mg at 10/11/22 1819  •  ceftriaxone (ROCEPHIN) 1 g/50 mL in dextrose IVPB, 1,000 mg, Intravenous, Q24H, Blayne Pen, DO, Last Rate: 100 mL/hr at 10/11/22 1926, 1,000 mg at 10/11/22 1926  •  clopidogrel (PLAVIX) tablet 75 mg, 75 mg, Oral, Daily, Blayne Pen, DO, 75 mg at 10/11/22 1023  •  heparin (porcine) subcutaneous injection 5,000 Units, 5,000 Units, Subcutaneous, Q8H [address], Blayne Pen, DO, 5,000 Units at 10/12/22 0542  •  insulin detemir (LEVEMIR) subcutaneous injection 15 Units, 15 Units, Subcutaneous, HS, Blayne Pen, DO, 15 Units at 10/11/22 2117  •  insulin lispro (HumaLOG) 100 units/mL subcutaneous injection 1-5 Units, 1-5 Units, Subcutaneous, HS, Blayne Pen, DO, 1 Units at 10/10/22 2107  •  insulin lispro (HumaLOG) 100 units/mL subcutaneous injection 1-6 Units, 1-6 Units, Subcutaneous, TID AC, 3 Units at 10/11/22 1822 **AND** Fingerstick Glucose (POCT), , , TID AC, Dorothy Pen, DO  •  loperamide (IMODIUM) capsule 2 mg, 2 mg, Oral, BID PRN, Dorothy Pen, DO  •  ondansetron [facility]) injection 4 mg, 4 mg, Intravenous, Q6H PRN, Dorothy Pen, DO  •  oxyCODONE (ROXICODONE) IR tablet 2 5 mg, 2 5 mg, Oral, Q4H PRN, Breana Delaney MD, 2 5 mg at 10/11/22 1023  •  pregabalin (LYRICA) capsule 50 mg, 50 mg, Oral, TID, Dorothy Pen, DO, 50 mg at 10/11/22 2117  •  sodium bicarbonate tablet 650 mg, 650 mg, Oral, BID after meals, Gavi Nicholas MD, 650 mg at 10/11/22 1819  •  tacrolimus (PROGRAF) capsule 1 mg, 1 mg, Oral, Q12H [address], Dorothy Pen, DO, 1 mg at 10/11/22 2117  •  tamsulosin (FLOMAX) capsule 0 4 mg, 0 4 mg, Oral, After Dinner, Blayne Pen, DO, 0 4 mg at 10/11/22 1819  •  temazepam (RESTORIL) capsule 15 mg, 15 mg, Oral, HS PRN, Gail Oh MD, 15 mg at 10/11/22 2117    Laboratory Results:  Results from last 7 days   Lab Units 10/12/22  0541 10/11/22  2122 10/11/22  0614 10/10/22  0516 10/09/22  0441 10/08/22  1509   WBC Thousand/uL 7 81  --  8 84 10 60* 10 74* 10 44*   HEMOGLOBIN g/dL 9 3* 8 9* 6 8* 7 7* 9 1* 8 9*   HEMATOCRIT % 29 3* 28 3* 23 1* 25 6* 29 7* 28 3*   PLATELETS Thousands/uL 125*  --  119* 115* 128* 140*   SODIUM mmol/L 140  --  139 137 136 135   POTASSIUM mmol/L 3 6  --  3 9 4 2 4 6 4 4   CHLORIDE mmol/L 114*  --  113* 110* 105 106   CO2 mmol/L 21  --  20* 22 23 24   BUN mg/dL 41*  --  46* 44* 40* 45*   CREATININE mg/dL 2 75*  --  3 10* 3 32* 2 73* 2 70*   CALCIUM mg/dL 8 2*  --  7 9* 8 1* 8 6 8 3   MAGNESIUM mg/dL 1 4*  --   --   --   --   --    PHOSPHORUS mg/dL 2 8  --   --   --   --   --        TRAUMA - CT head wo contrast   Final Result by Maru Will MD (10/08 1519)      No acute intracranial abnormality  Stable chronic microangiopathic changes within the brain  No change since recent prior  Workstation performed: [id number]         TRAUMA - CT spine cervical wo contrast   Final Result by Maru Will MD (10/08 4091)      No cervical spine fracture or traumatic malalignment  Workstation performed: [id number]         TRAUMA - CT chest abdomen pelvis w contrast   Final Result by Maru Will MD (10/08 0850)      1  No evidence of traumatic injury in the chest, abdomen or pelvis  2   Right upper lobe solid nodule measuring 9 mm, new since 2021, suspicious for metastatic disease  3   Right percutaneous nephroureterostomy in place with mild right hydronephrosis and a focus of air in the right renal collecting system, similar to the prior study  Delayed right nephrogram       4   Stable diffuse urinary bladder wall thickening with a focus of air in the urinary bladder, likely related to indwelling stent               I personally discussed this study with Evens Gomes on 10/8/2022 at 3:32 PM                 Workstation performed: [id number]         XR Trauma multiple (SLB/SLRA trauma bay ONLY)   Final Result by Regino Nichols MD (10/08 8054)      No acute cardiopulmonary disease within limitations of supine imaging  Workstation performed: [id number]         XR chest 1 view   Final Result by Regino Nichols MD (10/10 6940)      No acute cardiopulmonary disease within limitations of supine imaging  Workstation performed: [id number]             Portions of the record may have been created with voice recognition software  Occasional wrong word or "sound a like" substitutions may have occurred due to the inherent limitations of voice recognition software  Read the chart carefully and recognize, using context, where substitutions have occurred

## 2022-10-12 NOTE — ASSESSMENT & PLAN NOTE
Last HBA1c (8/22): 6 8      Recent Labs     10/11/22  1122 10/11/22  1643 10/11/22  2040 10/12/22  0809   POCGLU 110 232* 131 125     Currently on Levemir 15 Units HS and ISS algorithm 3     PLAN:   - Should consider decreasing Levemir due to CKD and decrease insulin clearance  Should consider Endocrinology consult     - On discharge should decrease Levemir, as patient is more likely to have hypoglycemic episodes due to incorrect administration    - Consider decreasing ISS algorithm from 3 to 2 due to CKD

## 2022-10-13 ENCOUNTER — TELEPHONE (OUTPATIENT)
Dept: FAMILY MEDICINE CLINIC | Facility: CLINIC | Age: 72
End: 2022-10-13

## 2022-10-13 VITALS
BODY MASS INDEX: 23.44 KG/M2 | WEIGHT: 132.28 LBS | HEART RATE: 87 BPM | TEMPERATURE: 97.5 F | HEIGHT: 63 IN | OXYGEN SATURATION: 96 % | DIASTOLIC BLOOD PRESSURE: 81 MMHG | RESPIRATION RATE: 16 BRPM | SYSTOLIC BLOOD PRESSURE: 129 MMHG

## 2022-10-13 LAB
ANION GAP SERPL CALCULATED.3IONS-SCNC: 7 MMOL/L (ref 4–13)
BASOPHILS # BLD AUTO: 0.02 THOUSANDS/ΜL (ref 0–0.1)
BASOPHILS NFR BLD AUTO: 0 % (ref 0–1)
BUN SERPL-MCNC: 40 MG/DL (ref 5–25)
CALCIUM SERPL-MCNC: 8.2 MG/DL (ref 8.3–10.1)
CHLORIDE SERPL-SCNC: 112 MMOL/L (ref 96–108)
CO2 SERPL-SCNC: 21 MMOL/L (ref 21–32)
CREAT SERPL-MCNC: 2.55 MG/DL (ref 0.6–1.3)
EOSINOPHIL # BLD AUTO: 0.84 THOUSAND/ΜL (ref 0–0.61)
EOSINOPHIL NFR BLD AUTO: 12 % (ref 0–6)
ERYTHROCYTE [DISTWIDTH] IN BLOOD BY AUTOMATED COUNT: 16.6 % (ref 11.6–15.1)
GFR SERPL CREATININE-BSD FRML MDRD: 23 ML/MIN/1.73SQ M
GLUCOSE SERPL-MCNC: 100 MG/DL (ref 65–140)
GLUCOSE SERPL-MCNC: 113 MG/DL (ref 65–140)
GLUCOSE SERPL-MCNC: 132 MG/DL (ref 65–140)
HCT VFR BLD AUTO: 30.1 % (ref 36.5–49.3)
HGB BLD-MCNC: 9.7 G/DL (ref 12–17)
IMM GRANULOCYTES # BLD AUTO: 0.03 THOUSAND/UL (ref 0–0.2)
IMM GRANULOCYTES NFR BLD AUTO: 0 % (ref 0–2)
LYMPHOCYTES # BLD AUTO: 0.83 THOUSANDS/ΜL (ref 0.6–4.47)
LYMPHOCYTES NFR BLD AUTO: 12 % (ref 14–44)
MAGNESIUM SERPL-MCNC: 1.4 MG/DL (ref 1.6–2.6)
MCH RBC QN AUTO: 25.5 PG (ref 26.8–34.3)
MCHC RBC AUTO-ENTMCNC: 32.2 G/DL (ref 31.4–37.4)
MCV RBC AUTO: 79 FL (ref 82–98)
MONOCYTES # BLD AUTO: 0.65 THOUSAND/ΜL (ref 0.17–1.22)
MONOCYTES NFR BLD AUTO: 9 % (ref 4–12)
NEUTROPHILS # BLD AUTO: 4.8 THOUSANDS/ΜL (ref 1.85–7.62)
NEUTS SEG NFR BLD AUTO: 67 % (ref 43–75)
NRBC BLD AUTO-RTO: 0 /100 WBCS
PHOSPHATE SERPL-MCNC: 2.5 MG/DL (ref 2.3–4.1)
PLATELET # BLD AUTO: 136 THOUSANDS/UL (ref 149–390)
PMV BLD AUTO: 12.1 FL (ref 8.9–12.7)
POTASSIUM SERPL-SCNC: 3.6 MMOL/L (ref 3.5–5.3)
RBC # BLD AUTO: 3.81 MILLION/UL (ref 3.88–5.62)
SODIUM SERPL-SCNC: 140 MMOL/L (ref 135–147)
WBC # BLD AUTO: 7.17 THOUSAND/UL (ref 4.31–10.16)

## 2022-10-13 PROCEDURE — 84100 ASSAY OF PHOSPHORUS: CPT | Performed by: INTERNAL MEDICINE

## 2022-10-13 PROCEDURE — 99232 SBSQ HOSP IP/OBS MODERATE 35: CPT | Performed by: INTERNAL MEDICINE

## 2022-10-13 PROCEDURE — 99239 HOSP IP/OBS DSCHRG MGMT >30: CPT | Performed by: INTERNAL MEDICINE

## 2022-10-13 PROCEDURE — 85025 COMPLETE CBC W/AUTO DIFF WBC: CPT | Performed by: INTERNAL MEDICINE

## 2022-10-13 PROCEDURE — 83735 ASSAY OF MAGNESIUM: CPT | Performed by: INTERNAL MEDICINE

## 2022-10-13 PROCEDURE — 82948 REAGENT STRIP/BLOOD GLUCOSE: CPT

## 2022-10-13 PROCEDURE — 80048 BASIC METABOLIC PNL TOTAL CA: CPT | Performed by: INTERNAL MEDICINE

## 2022-10-13 RX ORDER — SODIUM BICARBONATE 650 MG/1
650 TABLET ORAL
Qty: 60 TABLET | Refills: 0 | Status: SHIPPED | OUTPATIENT
Start: 2022-10-13 | End: 2022-10-17

## 2022-10-13 RX ORDER — MAGNESIUM SULFATE HEPTAHYDRATE 40 MG/ML
2 INJECTION, SOLUTION INTRAVENOUS ONCE
Status: COMPLETED | OUTPATIENT
Start: 2022-10-13 | End: 2022-10-13

## 2022-10-13 RX ORDER — TEMAZEPAM 7.5 MG/1
15 CAPSULE ORAL
Refills: 0
Start: 2022-10-13

## 2022-10-13 RX ORDER — CEFADROXIL 500 MG/1
500 CAPSULE ORAL EVERY 24 HOURS
Qty: 3 CAPSULE | Refills: 0 | Status: SHIPPED | OUTPATIENT
Start: 2022-10-13 | End: 2022-10-13 | Stop reason: SDUPTHER

## 2022-10-13 RX ORDER — PREGABALIN 75 MG/1
75 CAPSULE ORAL 2 TIMES DAILY
Qty: 20 CAPSULE | Refills: 0 | Status: SHIPPED | OUTPATIENT
Start: 2022-10-13 | End: 2022-10-23

## 2022-10-13 RX ORDER — SODIUM BICARBONATE 650 MG/1
650 TABLET ORAL
Qty: 60 TABLET | Refills: 0 | Status: SHIPPED | OUTPATIENT
Start: 2022-10-13 | End: 2022-10-13 | Stop reason: SDUPTHER

## 2022-10-13 RX ORDER — ZOLPIDEM TARTRATE 5 MG/1
5 TABLET ORAL
Qty: 30 TABLET | Refills: 0 | Status: SHIPPED | OUTPATIENT
Start: 2022-10-13 | End: 2022-10-23

## 2022-10-13 RX ORDER — CEFADROXIL 500 MG/1
500 CAPSULE ORAL EVERY 24 HOURS
Qty: 3 CAPSULE | Refills: 0 | Status: SHIPPED | OUTPATIENT
Start: 2022-10-13 | End: 2022-10-16

## 2022-10-13 RX ORDER — PREGABALIN 75 MG/1
75 CAPSULE ORAL 2 TIMES DAILY
Qty: 60 CAPSULE | Refills: 0 | Status: SHIPPED | OUTPATIENT
Start: 2022-10-13 | End: 2022-10-13 | Stop reason: SDUPTHER

## 2022-10-13 RX ORDER — ZOLPIDEM TARTRATE 5 MG/1
5 TABLET ORAL
Qty: 30 TABLET | Refills: 0 | Status: SHIPPED | OUTPATIENT
Start: 2022-10-13 | End: 2022-10-13 | Stop reason: SDUPTHER

## 2022-10-13 RX ADMIN — MAGNESIUM SULFATE HEPTAHYDRATE 2 G: 40 INJECTION, SOLUTION INTRAVENOUS at 09:57

## 2022-10-13 RX ADMIN — SODIUM BICARBONATE 650 MG TABLET 650 MG: at 09:57

## 2022-10-13 RX ADMIN — PREGABALIN 75 MG: 75 CAPSULE ORAL at 09:57

## 2022-10-13 RX ADMIN — TACROLIMUS 1 MG: 1 CAPSULE ORAL at 09:58

## 2022-10-13 RX ADMIN — HEPARIN SODIUM 5000 UNITS: 5000 INJECTION INTRAVENOUS; SUBCUTANEOUS at 05:01

## 2022-10-13 RX ADMIN — CLOPIDOGREL BISULFATE 75 MG: 75 TABLET ORAL at 09:57

## 2022-10-13 NOTE — PROGRESS NOTES
Geriatric Medicine Progress Note    Marques Ross 74 y o  male MRN: [id number]  Unit/Bed#: [facility] 926-01 Encounter: [id number]  PCP: Vincent Min DO  Date of Admission:  10/8/2022  2:12 PM    Assessment and Plan    Dementia [facility])  Assessment & Plan  Moderate progressing dementia  At baseline needs assistance with extended ADLs  Patient is able to dress and eat by himself  Likely has a vascular component  CT head supportive with microvascular changes  Last MOCA assessment done in 3/2022 was 18/30  Also combination of medications can cause increasing cognitive decline  Was evaluated by Neuropsychiatry and found to not have capacity to make decisions  PLAN:   - Continue Ambien at 5 mg HS for 2 weeks to decrease risk of withdrawal and rebound symptoms d/c on 10/26/2022   - Temazepam discontinues however, recommend restarting Temzepam 15 mg HS, and consider discontinuing with long term taper    - Can consider TSH, Vitamin B12 and Thiamine levels with routine labs   - Recommend adding Seroquel 12 5 mg in evening to help with agitation if patient continues to stay inpatient    Acute encephalopathy  Assessment & Plan  Consider secondary d/t metabolic encephalopathy 2/2 UTI  Spouse does report mild cognitive impairment at baseline with increasing confusion, which started after patient had an aneurysm 7 years ago  Neuropsychiatry evaluated and found to not have capacity  S/p multiple falls and CVA  CT head (10/8) - "No acute intracranial abnormality  Stable chronic microangiopathic changes within the brain  No change since recent prior "  Pt's significant other has POA, she will bring in the document once found    PLAN:    - Continue with delirium precautions   - please see dementia plan for medication recommendations    * Fall  Assessment & Plan  Likely secondary to orthostatic hypertension  However, patient was on multiple medications that can alter mentation including Ambien and Restoril    Orthostatics on 10/9 were positive  PLAN:   - Repeat Orthostatics   - Patient is on Tramadol periodically at home, will not restart as can contribute to dementia progression  Patient is not compliant with the medication  Currently on Oxycodone 2 5 mg Q4hrs for pain and well controlled  Continue with Oxycodone  Insomnia  Assessment & Plan  Patient has insomnia and has multiple nighttime medications  Home medications include: Restoril 30 mg HS PRN and Ambien 10 mg HS PRN     Plan:   - Restoril was discontinued and Ambien restarted at 5 mg HS PRN     DM II (diabetes mellitus, type II), controlled [facility])  Assessment & Plan  Last HBA1c (8/22): 6 8      Recent Labs     10/11/22  1122 10/11/22  1643 10/11/22  2040 10/12/22  0809   POCGLU 110 232* 131 125     Currently on Levemir 15 Units HS and ISS algorithm 3     PLAN:   - Should consider decreasing Levemir due to CKD and decrease insulin clearance  Should consider Endocrinology consult  - On discharge should decrease Levemir, as patient is more likely to have hypoglycemic episodes due to incorrect administration    - Consider decreasing ISS algorithm from 3 to 2 due to CKD      History of hydronephrosis  Assessment & Plan  Was found to have leakage around tube came into IR for tube check and noted urine drainage had contained mucus  UA found to have pyuria  Urine cultures show mixed contaminants  Was empirically treated with Rocephin  Urology recommended IR consult for tube check-discussed with  PLAN:   - continue with Flomax   - Rocephin 1 g Q24hrs     Pulmonary nodule  Assessment & Plan  CT scan CAP: RT upper lobe 9mm, new since 2021; suspicious for metastatic disease  Patient currently follows with oncologist Dr Lamberto Kaur, as he has history of bladder cancer  Would strongly recommend PET Scan as outpatient  Anemia  Assessment & Plan  Hb is 9 7 s/p 1 unit of pRBC  Patient has no acute bleeding  Iron panel showed iron deficiency     Lab Results   Component Value Date IRON 26 (L) 10/11/2022    TIBC 134 (L) 10/11/2022    FERRITIN 533 (H) 10/11/2022     PLAN:   - Continue to monitor with CBC   - Consider additional transfusion if patient HB < 7     History of liver transplant [facility])  Assessment & Plan  Currently on Tacrolimus  Level yesterday was 3 0, therapeutic 3 0-8 0     PLAN:   - Continue with Tacrolimus     Chronic kidney disease (CKD), stage IV (severe) [facility])  Assessment & Plan  Lab Results   Component Value Date    EGFR 21 10/12/2022    EGFR 18 10/11/2022    EGFR 17 10/10/2022    CREATININE 2 75 (H) 10/12/2022    CREATININE 3 10 (H) 10/11/2022    CREATININE 3 32 (H) 10/10/2022     Acute injury on CKD  Patient creatinine is improving  PLAN:   - Continue to monitor with BMP   - Be judicious with nephrotoxic medications   - Lyrica adjusted from 50 mg TID to 75 mg BID due to half life and renal dosage recommendation  - Recommend decreasing insulin dosage or Endocrine consult         VTE Pharmacologic Prophylaxis:   Pharmacologic: Heparin  Mechanical VTE Prophylaxis in Place: Yes    Patient Centered Rounds: I have performed bedside rounds with nursing staff today  Education and Discussions with Patient: Patient was educated on plan of care    Time Spent for Care: 30 minutes  More than 50% of total time spent on counseling and coordination of care as described above  Current Length of Stay: 5 day(s)    Current Patient Status: Inpatient     Discharge Plan: As per primary team     Code Status: Level 1 - Full Code      Subjective:   Patient was seen and evaluated at bedside  Patient is ambulating well  He is anxious to leave the hospital and is irritable about staying in the hospital  He does not understand why he needs to continue staying in the hospital for treatment and assessment       Discussed with primary team      Objective:     Vitals:   Temp (24hrs), Av 8 °F (36 6 °C), Min:97 7 °F (36 5 °C), Max:97 9 °F (36 6 °C)    Temp:  [97 7 °F (36 5 °C)-97 9 °F (36 6 °C)] 97 7 °F (36 5 °C)  HR:  [80-87] 87  Resp:  [18-19] 19  BP: (129-141)/(81-86) 129/81  SpO2:  [96 %] 96 %  Body mass index is 23 43 kg/m²  Input and Output Summary (last 24 hours): Intake/Output Summary (Last 24 hours) at 10/13/2022 1234  Last data filed at 10/12/2022 2150  Gross per 24 hour   Intake 240 ml   Output 250 ml   Net -10 ml       Physical Exam:     Physical Exam  Constitutional:       Appearance: Normal appearance  HENT:      Head: Normocephalic and atraumatic  Mouth/Throat:      Mouth: Mucous membranes are moist       Pharynx: Oropharynx is clear  Cardiovascular:      Rate and Rhythm: Normal rate and regular rhythm  Pulses: Normal pulses  Heart sounds: Normal heart sounds  Pulmonary:      Effort: Pulmonary effort is normal       Breath sounds: Normal breath sounds  Abdominal:      General: Bowel sounds are normal       Palpations: Abdomen is soft  Skin:     General: Skin is warm  Neurological:      Mental Status: He is alert and oriented to person, place, and time  Psychiatric:         Attention and Perception: Attention normal          Mood and Affect: Mood is anxious  Behavior: Behavior is not slowed  Behavior is cooperative  Additional Data:     Labs:    Results from last 7 days   Lab Units 10/13/22  0508   WBC Thousand/uL 7 17   HEMOGLOBIN g/dL 9 7*   HEMATOCRIT % 30 1*   PLATELETS Thousands/uL 136*   NEUTROS PCT % 67   LYMPHS PCT % 12*   MONOS PCT % 9   EOS PCT % 12*     Results from last 7 days   Lab Units 10/13/22  0508 10/11/22  0614 10/10/22  0516   POTASSIUM mmol/L 3 6   < > 4 2   CHLORIDE mmol/L 112*   < > 110*   CO2 mmol/L 21   < > 22   BUN mg/dL 40*   < > 44*   CREATININE mg/dL 2 55*   < > 3 32*   CALCIUM mg/dL 8 2*   < > 8 1*   ALK PHOS U/L  --   --  74   ALT U/L  --   --  13   AST U/L  --   --  14    < > = values in this interval not displayed       Results from last 7 days   Lab Units 10/08/22  1509   INR  1 14     Results from last 7 days   Lab Units 10/13/22  1111 10/13/22  0745 10/12/22  2017 10/12/22  1700 10/12/22  1156 10/12/22  0809 10/11/22  2040 10/11/22  1643 10/11/22  1122 10/11/22  0804 10/10/22  2051 10/10/22  1657   POC GLUCOSE mg/dl 113 100 185* 94 188* 125 131 232* 110 90 203* 176*             * I Have Reviewed All Lab Data Listed Above  * Additional Pertinent Lab Tests Reviewed:  All Labs Within Last 24 Hours Reviewed      Recent Cultures (last 7 days):     Results from last 7 days   Lab Units 10/09/22  0929   URINE CULTURE  80,000-89,000 cfu/ml        Last 24 Hours Medication List:   Current Facility-Administered Medications   Medication Dose Route Frequency Provider Last Rate   • acetaminophen  650 mg Oral Q6H PRN Christin Money, DO     • aluminum-magnesium hydroxide-simethicone  30 mL Oral Q6H PRN Christin Money, DO     • atorvastatin  80 mg Oral After Dinner Christin Money, DO     • cefTRIAXone  1,000 mg Intravenous Q24H Christin Money, DO 1,000 mg (10/12/22 1954)   • clopidogrel  75 mg Oral Daily Christin Money, DO     • heparin (porcine)  5,000 Units Subcutaneous Q8H [address] Ashleymarion Carreon, DO     • insulin detemir  15 Units Subcutaneous HS Ashley Carreon DO     • insulin lispro  1-5 Units Subcutaneous HS Ashley Carreon DO     • insulin lispro  1-6 Units Subcutaneous TID AC Ashley Carreon DO     • loperamide  2 mg Oral BID PRN Christin Money, DO     • ondansetron  4 mg Intravenous Q6H PRN Christin Money, DO     • oxyCODONE  2 5 mg Oral Q4H PRN Ailyn Griffiths MD     • pregabalin  75 mg Oral BID Jose Murrieta MD     • sodium bicarbonate  650 mg Oral BID after meals Anya Petit MD     • tacrolimus  1 mg Oral Q12H [address] Hcristin Money, DO     • tamsulosin  0 4 mg Oral After Dinner Christin Money, DO     • zolpidem  5 mg Oral HS PRN Jose Murrieta MD          ** Please Note: Dictation voice to text software may have been used in the creation of this document   Deana Mosley  10/13/22  12:34 PM

## 2022-10-13 NOTE — TELEPHONE ENCOUNTER
Spoke to BAKARI JOSEPH she wanted to know the name of place that sent us a fax for his Glucose supplies

## 2022-10-13 NOTE — CASE MANAGEMENT
Case Management Discharge Planning Note    Patient name Keerthi Murillo  Location [facility] 926/[facility] 210-88 MRN [id number]  : 1950 Date 10/13/2022       Current Admission Date: 10/8/2022  Current Admission Diagnosis:Fall   Patient Active Problem List    Diagnosis Date Noted   • Dementia ([facility] ) 10/12/2022   • Fall 10/08/2022   • Facial laceration 10/08/2022   • Chronic kidney disease (CKD), stage IV (severe) ([place] [address] ) 10/08/2022   • History of bladder cancer 10/08/2022   • History of liver transplant ([place] [address] ) 10/08/2022   • DM II (diabetes mellitus, type II), controlled ([place] [address] ) 10/08/2022   • Anemia 10/08/2022   • Thrombocytopenia ([place] [address] ) 10/08/2022   • Pulmonary nodule 10/08/2022   • Insomnia 10/08/2022   • Acute encephalopathy 10/08/2022   • History of hydronephrosis 10/08/2022      LOS (days): 5  Geometric Mean LOS (GMLOS) (days): 3 80  Days to GMLOS:-1 1     OBJECTIVE:  Risk of Unplanned Readmission Score: 20 35         Current admission status: Inpatient   Preferred Pharmacy:   PATIENT/FAMILY REPORTS NO PREFERRED PHARMACY  No address on file      Primary Care Provider: Agustina Campoverde DO    Primary Insurance: Deepali [facility] RE[facility]  Secondary Insurance:     DISCHARGE DETAILS:      Treatment Team Recommendation: Home with  [facility]  Discharge Destination Plan[de-identified] Home with [place] at Discharge : Family           ETA of Transport (Date): 10/13/22  ETA of Transport (Time): 1600        Accompanied by: Family member     IMM Given (Date):: 10/13/22  IMM Given to[de-identified] Family  Family notified[de-identified] SO  Additional Comments: CM spoke to pt's SO to discuss d/c  P t's SO in agreement for pt to d/c home today  CM discussed transport and she agreed to transport today by 1600  CM notified provider and nurse of  time  Pt's SO in agreement for OP CM referral  CM sent referral as requested and IMM was reviewed

## 2022-10-13 NOTE — DISCHARGE SUMMARY
Discharge Summary - [facility]  Patient: Jaron Rizo 74 y o  male   MRN: [id number]  PCP: [facility] DO  Unit/Bed#: [facility] 926-01 Encounter: [id number]            Discharging Physician / Practitioner: Minh Dyer  PCP: [facility] DO  Admission Date:   Admission Orders (From admission, onward)     Ordered        10/08/22 [facility]  Once                      Discharge Date: 10/13/22      Reason for Admission:  Falls      Discharge Diagnoses:     Principal Problem:    Status post fall    Active Problems:    Facial laceration    Chronic kidney disease stage 4     History of bladder cancer    History of liver transplant    Diabetes mellitus type 2    Anemia of chronic disease    Thrombocytopenia    Pulmonary nodule    Insomnia    Acute encephalopathy    History of hydronephrosis    Dementia      Consultations During Hospital Stay:  · Nephrology  · Gastroenterology  · Geriatrics        Hospital Course:     Jaron Rizo is a 74 y o  male patient who originally presented to the hospital on 10/8/2022 after sustaining falls at home  He does have an underlying history of cognitive impairment  He was initially seen by the trauma team and fortunately imaging was negative for osseous abnormalities  He did receive suture repair for facial laceration  Initially orthostatics were positive, which corrected on IV fluids and he was counseled on increased oral intake  Gradually through hospital course, his ambulation improved back to baseline  He also had a suspected UTI with underlying history of hydronephrosis s/p PCN tube placement initiated on IV antibiotics and transition to an oral course to come post-discharge  He was deemed incompetent to make self medical decisions by neuropsychology, and will be cared for by his significant other and family once he is discharged home  He also received a PRBC transfusion for a drop in hemoglobin which stabilized post transfusion    In regards to his falls, he was also evaluated by Geriatrics with recommendations made for slow tapering of his Restoril, in addition to a decreased dose of his Ambien for insomnia  Also, his Lyrica dosing was decreased to appropriate renal dosing  He should be followed in the outpatient setting by his PCP for further tapering of Restoril, over time  He will continue his tech limits for history of liver transplant  His acute on chronic kidney injury improved back to baseline with IV fluid hydration  He was in relatively pleasant spirits on day of discharge  New prescriptions have been sent to his preferred pharmacy ([address] [place] [address])  Condition at Discharge: fair       Discharge Day Visit / Exam:     Vitals: Blood Pressure: 129/81 (10/13/22 1535)  Pulse: 87 (10/13/22 0748)  Temperature: 97 5 °F (36 4 °C) (10/13/22 1535)  Temp Source: Tympanic (10/12/22 2150)  Respirations: 16 (10/13/22 1535)  Height: 5' 3" (160 cm) (10/08/22 2043)  Weight - Scale: 60 kg (132 lb 4 4 oz) (10/08/22 2043)  SpO2: 96 % (10/13/22 0748)      Physical exam - I had a face-to-face encounter with the patient on day of discharge  Discussion with Patient and/or Family:  The patient has been advised to return to the ER immediately if any symptoms recur or worsen  Discharge instructions/Information to Patient and/or Family:   See after visit summary for information provided to patient and/or family  Provisions for Follow-Up Care:  See after visit summary for information related to follow-up care and any pertinent home health orders  Disposition:   Home with home health services      Discharge Medications:  See after visit summary for reconciled discharge medications provided to patient and/or family  Discharge Statement:  I spent 38 minutes discharging the patient  This time was spent on the day of discharge  I had direct contact with the patient on the day of discharge   Greater than 50% of the total time was spent examining patient, answering all patient questions, arranging and discussing plan of care with patient as well as directly providing post-discharge instructions  Additional time then spent on discharge activities  ** Please Note: This note is constructed using a voice recognition dictation system  An occasional wrong word/phrase or “sound-a-like” substitution may have been picked up by dictation device due to the inherent limitations of voice recognition software  Read the chart carefully and recognize, using reasonable context, where substitutions may have occurred  **

## 2022-10-13 NOTE — PLAN OF CARE
Problem: MOBILITY - ADULT  Goal: Maintain or return to baseline ADL function  Description: INTERVENTIONS:  -  Assess patient's ability to carry out ADLs; assess patient's baseline for ADL function and identify physical deficits which impact ability to perform ADLs (bathing, care of mouth/teeth, toileting, grooming, dressing, etc )  - Assess/evaluate cause of self-care deficits   - Assess range of motion  - Assess patient's mobility; develop plan if impaired  - Assess patient's need for assistive devices and provide as appropriate  - Encourage maximum independence but intervene and supervise when necessary  - Involve family in performance of ADLs  - Assess for home care needs following discharge   - Consider OT consult to assist with ADL evaluation and planning for discharge  - Provide patient education as appropriate  Outcome: Progressing     Problem: Potential for Falls  Goal: Patient will remain free of falls  Description: INTERVENTIONS:  - Educate patient/family on patient safety including physical limitations  - Instruct patient to call for assistance with activity   - Consult OT/PT to assist with strengthening/mobility   - Keep Call bell within reach  - Keep bed low and locked with side rails adjusted as appropriate  - Keep care items and personal belongings within reach  - Initiate and maintain comfort rounds  - Make Fall Risk Sign visible to staff  - Offer Toileting every 2 Hours, in advance of need  - Initiate/Maintain TABS alarm  - Obtain necessary fall risk management equipment  - Apply yellow socks and bracelet for high fall risk patients  - Consider moving patient to room near nurses station  Outcome: Progressing

## 2022-10-14 ENCOUNTER — TRANSITIONAL CARE MANAGEMENT (OUTPATIENT)
Dept: FAMILY MEDICINE CLINIC | Facility: CLINIC | Age: 72
End: 2022-10-14

## 2022-10-14 ENCOUNTER — HOSPITAL ENCOUNTER (OUTPATIENT)
Dept: RADIOLOGY | Facility: HOSPITAL | Age: 72
Discharge: HOME | End: 2022-10-14
Attending: INTERNAL MEDICINE
Payer: COMMERCIAL

## 2022-10-14 DIAGNOSIS — C67.0 MALIGNANT NEOPLASM OF TRIGONE OF URINARY BLADDER (HCC): ICD-10-CM

## 2022-10-14 PROCEDURE — G1004 CDSM NDSC: HCPCS

## 2022-10-14 PROCEDURE — 71250 CT THORAX DX C-: CPT

## 2022-10-14 PROCEDURE — 74176 CT ABD & PELVIS W/O CONTRAST: CPT

## 2022-10-14 NOTE — UTILIZATION REVIEW
NOTIFICATION OF ADMISSION DISCHARGE   This is a Notification of Discharge from [address]  Please be advised that this patient has been discharge from our facility  Below you will find the admission and discharge date and time including the patient’s disposition  UTILIZATION REVIEW CONTACT:  Valentina Longo  Utilization   Network Utilization Review Department  Phone: [phone] x carefully listen to the prompts  All voicemails are confidential   Email: [email or web address]     ADMISSION INFORMATION  PRESENTATION DATE: 10/8/2022  2:12 PM  OBERVATION ADMISSION DATE:   INPATIENT ADMISSION DATE: 10/8/22  4:09 PM   DISCHARGE DATE: 10/13/2022  6:09 PM  DISPOSITION: Home with [place] with [address] INFORMATION:  Send all requests for admission clinical reviews, approved or denied determinations and any other requests to dedicated fax number below belonging to the campus where the patient is receiving treatment   List of dedicated fax numbers:  [address] DENIALS (Administrative/Medical Necessity) [phone]   [address] (Maternity/NICU/Pediatrics) [phone]   [facility] [phone]   [place] [address] [phone]   [address] [phone]   220 [facility] [phone] 90 [facility] [phone]   [address] [place] [address] 119 [phone]   [facility]  [phone] 4058 [facility] [phone] 412 [facility] 850 [facility] [phone]

## 2022-10-17 ENCOUNTER — TELEPHONE (OUTPATIENT)
Dept: FAMILY MEDICINE CLINIC | Facility: CLINIC | Age: 72
End: 2022-10-17

## 2022-10-17 ENCOUNTER — OFFICE VISIT (OUTPATIENT)
Dept: FAMILY MEDICINE CLINIC | Facility: CLINIC | Age: 72
End: 2022-10-17
Payer: COMMERCIAL

## 2022-10-17 VITALS
HEIGHT: 63 IN | OXYGEN SATURATION: 100 % | DIASTOLIC BLOOD PRESSURE: 60 MMHG | WEIGHT: 118.8 LBS | RESPIRATION RATE: 18 BRPM | BODY MASS INDEX: 21.05 KG/M2 | TEMPERATURE: 97.6 F | SYSTOLIC BLOOD PRESSURE: 120 MMHG | HEART RATE: 82 BPM

## 2022-10-17 DIAGNOSIS — N18.4 STAGE 4 CHRONIC KIDNEY DISEASE (HCC): Chronic | ICD-10-CM

## 2022-10-17 DIAGNOSIS — F03.90 DEMENTIA, UNSPECIFIED DEMENTIA SEVERITY, UNSPECIFIED DEMENTIA TYPE, UNSPECIFIED WHETHER BEHAVIORAL, PSYCHOTIC, OR MOOD DISTURBANCE OR ANXIETY (HCC): ICD-10-CM

## 2022-10-17 DIAGNOSIS — R91.1 PULMONARY NODULE: ICD-10-CM

## 2022-10-17 DIAGNOSIS — M25.551 HIP PAIN, ACUTE, RIGHT: ICD-10-CM

## 2022-10-17 DIAGNOSIS — F51.04 PSYCHOPHYSIOLOGICAL INSOMNIA: ICD-10-CM

## 2022-10-17 DIAGNOSIS — Z09 HOSPITAL DISCHARGE FOLLOW-UP: Primary | ICD-10-CM

## 2022-10-17 DIAGNOSIS — S01.81XS FACIAL LACERATION, SEQUELA: ICD-10-CM

## 2022-10-17 PROBLEM — Z94.4 HISTORY OF LIVER TRANSPLANT (HCC): Chronic | Status: RESOLVED | Noted: 2022-09-20 | Resolved: 2022-10-17

## 2022-10-17 PROBLEM — D69.6 THROMBOCYTOPENIA (HCC): Chronic | Status: RESOLVED | Noted: 2022-09-20 | Resolved: 2022-10-17

## 2022-10-17 PROBLEM — D69.6 THROMBOCYTOPENIA (HCC): Status: RESOLVED | Noted: 2022-10-08 | Resolved: 2022-10-17

## 2022-10-17 PROCEDURE — 99495 TRANSJ CARE MGMT MOD F2F 14D: CPT | Performed by: FAMILY MEDICINE

## 2022-10-17 RX ORDER — SODIUM CHLORIDE 9 MG/ML
INJECTION, SOLUTION INTRAVENOUS
COMMUNITY
Start: 2022-10-10

## 2022-10-17 NOTE — ASSESSMENT & PLAN NOTE
Lab Results   Component Value Date    EGFR 23 10/13/2022    EGFR 21 10/12/2022    EGFR 18 10/11/2022    CREATININE 2 55 (H) 10/13/2022    CREATININE 2 75 (H) 10/12/2022    CREATININE 3 10 (H) 10/11/2022   worsening kidney function with dehydration

## 2022-10-17 NOTE — PROGRESS NOTES
Assessment/Plan:    1  Hospital discharge follow-up    2  Facial laceration, sequela  Assessment & Plan:  Sutures need to come out today    Orders:  -     Suture removal    3  Pulmonary nodule  Assessment & Plan: To see Dr Bharat Singh for follow up      4  Psychophysiological insomnia  Assessment & Plan:  ambien decreased to 5mg  Temazepam decreased to 15mg      5  Hip pain, acute, right  -     XR hip/pelv 2-3 vws right if performed; Future; Expected date: 10/17/2022    6  Dementia, unspecified dementia severity, unspecified dementia type, unspecified whether behavioral, psychotic, or mood disturbance or anxiety ([facility] [address] )    7  Stage 4 chronic kidney disease [facility])  Assessment & Plan:  Lab Results   Component Value Date    EGFR 23 10/13/2022    EGFR 21 10/12/2022    EGFR 18 10/11/2022    CREATININE 2 55 (H) 10/13/2022    CREATININE 2 75 (H) 10/12/2022    CREATININE 3 10 (H) 10/11/2022   worsening kidney function with dehydration              There are no Patient Instructions on file for this visit  Return in about 4 months (around 2/17/2023) for Recheck  Subjective:      Patient ID: Sindy Sage is a 74 y o  male      Chief Complaint   Patient presents with   • Transition of Care Management     Patient here for TCM Discharged on 10/13/2022 for falls [facility]        Here with wife  Admitted on 8th  Fell on 8th and hit head-laceration and needed stitches  Was confused for a few days prior  Per wife was also leaking from around his nephrostomy tube  Had UTI on culture  Not drinking water per wife  CT lung shows new nodule on CT from 2021  Per wife pain in right hip now while walking  Per wife he doesn't sleep  She's worried he doesn't he will fall and hurt himself again  neuropysch eval deemed medically incompenent        The following portions of the patient's history were reviewed and updated as appropriate: allergies, current medications, past family history, past medical history, past social history, past surgical history and problem list     Review of Systems   Constitutional: Positive for fatigue  HENT: Negative  Eyes: Negative  Respiratory: Negative  Cardiovascular: Negative  Gastrointestinal: Negative  Endocrine: Negative  Genitourinary: Negative  Musculoskeletal: Positive for arthralgias (hip pain)  Allergic/Immunologic: Negative  Neurological: Negative  Psychiatric/Behavioral: Positive for sleep disturbance           Current Outpatient Medications   Medication Sig Dispense Refill   • acetaminophen (TYLENOL) 325 mg tablet Take 3 tablets (975 mg total) by mouth every 8 (eight) hours  0   • Alcohol Swabs (B-D SINGLE USE SWABS REGULAR) PADS USE 2-3 TIMES DAILY AS NEEDED     • Blood Glucose Calibration (OT ULTRA/FASTTK CNTRL SOLN) SOLN USE AS DIRECTED 1 each 0   • clopidogrel (PLAVIX) 75 mg tablet Take 1 tablet (75 mg total) by mouth daily 90 tablet 3   • Comfort EZ Pen Needles 32G X 4 MM MISC USE 3-4 AS DIRECTED 100 each 5   • Continuous Blood Gluc Sensor (FreeStyle Frank 14 Day Sensor) MISC Use 1 Device 4 (four) times a day 1 each 5   • Incontinence Supply Disposable (Incontinence Brief Medium) MISC Use 4 (four) times a day 120 each 0   • insulin detemir (Levemir FlexTouch) 100 Units/mL injection pen Inject 15 Units under the skin daily at bedtime 15 mL 3   • Lancet Devices (Lancing Device) MISC USE AS DIRECTED 1 each 0   • loperamide (IMODIUM) 2 mg capsule Take 1 capsule (2 mg total) by mouth 2 (two) times a day as needed for diarrhea [place] carmen tableta dos veces por cynthia si tiene diarrea 90 capsule 0   • Nutritional Supplements (Glucerna Shake) LIQD Take 1 Bottle by mouth 3 (three) times a day 22662 mL 0   • OneTouch Ultra test strip TEST UP TO 3 TIMES A  each 0   • pregabalin (LYRICA) 75 mg capsule Take 1 capsule (75 mg total) by mouth 2 (two) times a day for 10 days 20 capsule 0   • rosuvastatin (CRESTOR) 40 MG tablet TAKE ONE (1) TABLET BY MOUTH DAILY 30 tablet 5   • tacrolimus (PROGRAF) 1 mg capsule TAKE 1 CAPSULE (1 MG TOTAL) BY MOUTH EVERY 12 (TWELVE) HOURS 180 capsule 3   • tamsulosin (FLOMAX) 0 4 mg Take 1 capsule (0 4 mg total) by mouth daily with dinner 90 capsule 0   • temazepam (RESTORIL) 7 5 mg capsule Take 2 capsules (15 mg total) by mouth daily at bedtime as needed for sleep  0   • zolpidem (AMBIEN) 5 mg tablet Take 1 tablet (5 mg total) by mouth daily at bedtime as needed for sleep for up to 10 days 30 tablet 0   • clopidogrel (PLAVIX) 75 mg tablet Take 75 mg by mouth daily (Patient not taking: Reported on 10/17/2022)     • clopidogrel (PLAVIX) 75 mg tablet Take 75 mg by mouth daily (Patient not taking: Reported on 10/17/2022)     • Continuous Blood Gluc  (FreeStyle Frank 14 Day [place]) NATALIA Use 1 Device continuous (Patient not taking: Reported on 10/17/2022) 1 each 0   • insulin detemir (LEVEMIR) 100 units/mL subcutaneous injection Inject 15 Units under the skin daily at bedtime (Patient not taking: Reported on 10/17/2022)     • INSULIN SYRINGE 1CC/29G 29G X 1/2" 1 ML MISC by Does not apply route (Patient not taking: Reported on 10/17/2022)     • Lancets MISC by Does not apply route (Patient not taking: Reported on 10/17/2022)     • loperamide (IMODIUM) 2 mg capsule Take 2 mg by mouth 4 (four) times a day as needed for diarrhea (Patient not taking: Reported on 10/17/2022)     • loperamide (IMODIUM) 2 mg capsule Take 2 mg by mouth 2 (two) times a day as needed for diarrhea (Patient not taking: Reported on 10/17/2022)     • pregabalin (LYRICA) 50 mg capsule TAKE 1 CAPSULE (50 MG TOTAL) BY MOUTH 3 (THREE) TIMES A DAY (Patient not taking: Reported on 10/17/2022) 90 capsule 5   • pregabalin (LYRICA) 50 mg capsule Take 50 mg by mouth 3 (three) times a day (Patient not taking: Reported on 10/17/2022)     • rosuvastatin (CRESTOR) 40 MG tablet Take 40 mg by mouth daily (Patient not taking: Reported on 10/17/2022)     • rosuvastatin (CRESTOR) 40 MG tablet Take 40 mg by mouth daily (Patient not taking: Reported on 10/17/2022)     • sodium chloride 0 9 % SOLN Inject 10 mL into a catheter in a vein in the morning Flush through PCN catheter daily (Patient not taking: Reported on 10/17/2022)     • sodium chloride 10 ML BY INTRACATHETER ROUTE DAILY INTRACATHETER FLUSHING DAILY  MAY SUBSTITUTE PREFILLED SYRINGE WITH NORMAL SALINE 10 ML VIALS, 10 ML SYRI     • tacrolimus (PROGRAF) 1 mg capsule Take 1 mg by mouth every 12 (twelve) hours (Patient not taking: Reported on 10/17/2022)     • tacrolimus (PROGRAF) 1 mg capsule Take 1 mg by mouth every 12 (twelve) hours (Patient not taking: Reported on 10/17/2022)     • tamsulosin (FLOMAX) 0 4 mg Take 0 4 mg by mouth daily with dinner (Patient not taking: Reported on 10/17/2022)     • tamsulosin (FLOMAX) 0 4 mg Take 0 4 mg by mouth daily with dinner (Patient not taking: Reported on 10/17/2022)       Current Facility-Administered Medications   Medication Dose Route Frequency Provider Last Rate Last Admin   • lidocaine (URO-JET, GLYDO) 2 % urethral/mucosal gel 1 application  1 application Urethral Daily PRN Jessica Hammond MD           Objective:    /60 (BP Location: Left arm, Patient Position: Sitting, Cuff Size: Standard)   Pulse 82   Temp 97 6 °F (36 4 °C) (Tympanic)   Resp 18   Ht 5' 3" (1 6 m)   Wt 53 9 kg (118 lb 12 8 oz)   SpO2 100%   BMI 21 04 kg/m²     Suture removal    Date/Time: 10/17/2022 2:28 PM  Performed by: Muriel Jc DO  Authorized by: Muriel Jc DO   Universal Protocol:  Consent: Verbal consent obtained  Risks and benefits: risks, benefits and alternatives were discussed  Timeout called at: 10/17/2022 2:29 PM   Patient identity confirmed: verbally with patient        Patient location:  Other (comment)  Location:     Laterality:  Right    Location:  [address] location:  Eyebrow    Eyebrow location:  R eyebrow  Procedure details:      Tools used:  Suture removal kit, scissors and tweezers    Wound appearance:  No sign(s) of infection    Number of sutures removed:  7  Post-procedure details:     Post-removal:  No dressing applied    Patient tolerance of procedure: Tolerated well, no immediate complications       Physical Exam  Vitals and nursing note reviewed  Constitutional:       Appearance: Normal appearance  HENT:      Head: Normocephalic and atraumatic  Eyes:      Extraocular Movements: Extraocular movements intact  Pupils: Pupils are equal, round, and reactive to light  Cardiovascular:      Rate and Rhythm: Normal rate and regular rhythm  Pulses: Normal pulses  Heart sounds: Normal heart sounds  Pulmonary:      Effort: Pulmonary effort is normal       Breath sounds: Normal breath sounds  Musculoskeletal:         General: Tenderness (hip pain) present  Cervical back: Normal range of motion and neck supple  Skin:     Capillary Refill: Capillary refill takes less than 2 seconds  Neurological:      General: No focal deficit present  Mental Status: He is alert and oriented to person, place, and time  Psychiatric:         Mood and Affect: Mood normal          Behavior: Behavior normal          Thought Content:  Thought content normal          Judgment: Judgment normal                 Muriel Blood

## 2022-10-17 NOTE — TELEPHONE ENCOUNTER
Just FYI paper work for start of care faxed to be  filled out  Patient signed on care  care this weekend      Thank You

## 2022-10-18 ENCOUNTER — TELEPHONE (OUTPATIENT)
Dept: HEMATOLOGY ONCOLOGY | Facility: CLINIC | Age: 72
End: 2022-10-18

## 2022-10-18 ENCOUNTER — PATIENT OUTREACH (OUTPATIENT)
Dept: CASE MANAGEMENT | Facility: OTHER | Age: 72
End: 2022-10-18

## 2022-10-18 ENCOUNTER — PATIENT OUTREACH (OUTPATIENT)
Dept: FAMILY MEDICINE CLINIC | Facility: CLINIC | Age: 72
End: 2022-10-18

## 2022-10-18 NOTE — PROGRESS NOTES
OSW received call from colleague, Melinda Nunes RN outpatient CM regarding Mr  Shireen Miller  Explained pt's daughter, Colon Nation called asking why pt was not admitted to SNF after hospital stay  OSW reviewed recent hospital notes with Gabi and offered to call daughter to discuss further  Placed call to Roberto Carlos Lucas ([phone]) however she did not answer  Recording stated VM is not set up  Will attempt again at another time

## 2022-10-18 NOTE — TELEPHONE ENCOUNTER
I spoke with Ubaldo in regards to having the patients CT read from 10/14/22 prior to his appt on 10/20/22  Ubaldo states she will have that test read

## 2022-10-18 NOTE — PROGRESS NOTES
Received call from daughter Linsey Bright, communication consent reviewed  She reports her "mother is confused as she thought patient was going to a nursing home"   "she went to hospital and brought him home"  As per care coordination notes, patient was discharged to home with home health, [facility]  Daughter would like additional information on treatment plan, shared that her "mother is very overwhelmed"  Advised that patient is known to outpatient Oncology social worker  Will collaborate and call her back  Call to OP SW, she will contact daughter

## 2022-10-18 NOTE — TELEPHONE ENCOUNTER
I spoke with the patients significant other Andrea Sayer in regards to the patients lab work that needs to be completed prior to his appt on 10/20/22 at 11:30am  [address] she will have the patient get it done

## 2022-10-19 ENCOUNTER — TELEPHONE (OUTPATIENT)
Dept: FAMILY MEDICINE CLINIC | Facility: CLINIC | Age: 72
End: 2022-10-19

## 2022-10-19 ENCOUNTER — TELEPHONE (OUTPATIENT)
Dept: HEMATOLOGY ONCOLOGY | Facility: CLINIC | Age: 72
End: 2022-10-19

## 2022-10-19 NOTE — TELEPHONE ENCOUNTER
Blanche Fernandez called from [facility] wanting to confirm patient's medications   I tried to help her as best I could, but feel it would be better if she spoke with you    Best number to reach her is [phone]

## 2022-10-19 NOTE — TELEPHONE ENCOUNTER
Taylor of [facility] asking if patient should be on Flomax    If so he would need a prescription

## 2022-10-19 NOTE — TELEPHONE ENCOUNTER
Received call from radiology reporting significant findings on CT: IMPRESSION:     1  A 9 mm right upper lobe solid pulmonary nodule, unchanged in size since 10/8/2022, but new since 5/23/2021, this finding is again suspicious for a pulmonary metastasis      2   Persistent mild right hydronephrosis with a right percutaneous nephroureteral stent in appropriate position      3   Stable mural thickening of the mid to distal right ureter with surrounding periureteral stranding since CT 6/9/2022      The study was marked in EPIC for significant notification

## 2022-10-20 ENCOUNTER — PATIENT OUTREACH (OUTPATIENT)
Dept: CASE MANAGEMENT | Facility: OTHER | Age: 72
End: 2022-10-20

## 2022-10-20 ENCOUNTER — HOSPITAL ENCOUNTER (OUTPATIENT)
Dept: RADIOLOGY | Facility: HOSPITAL | Age: 72
Discharge: HOME | End: 2022-10-20
Payer: COMMERCIAL

## 2022-10-20 ENCOUNTER — TELEPHONE (OUTPATIENT)
Dept: OTHER | Facility: OTHER | Age: 72
End: 2022-10-20

## 2022-10-20 ENCOUNTER — OFFICE VISIT (OUTPATIENT)
Dept: HEMATOLOGY ONCOLOGY | Facility: CLINIC | Age: 72
End: 2022-10-20
Payer: COMMERCIAL

## 2022-10-20 VITALS
SYSTOLIC BLOOD PRESSURE: 134 MMHG | WEIGHT: 120.5 LBS | DIASTOLIC BLOOD PRESSURE: 76 MMHG | OXYGEN SATURATION: 100 % | HEIGHT: 63 IN | BODY MASS INDEX: 21.35 KG/M2 | HEART RATE: 76 BPM | TEMPERATURE: 97.4 F

## 2022-10-20 DIAGNOSIS — N40.1 BENIGN LOCALIZED PROSTATIC HYPERPLASIA WITH LOWER URINARY TRACT SYMPTOMS (LUTS): ICD-10-CM

## 2022-10-20 DIAGNOSIS — R91.1 SOLITARY PULMONARY NODULE: ICD-10-CM

## 2022-10-20 DIAGNOSIS — M25.551 HIP PAIN, ACUTE, RIGHT: ICD-10-CM

## 2022-10-20 DIAGNOSIS — C67.0 MALIGNANT NEOPLASM OF TRIGONE OF URINARY BLADDER (HCC): Primary | ICD-10-CM

## 2022-10-20 DIAGNOSIS — R29.6 MULTIPLE FALLS: ICD-10-CM

## 2022-10-20 DIAGNOSIS — Z94.4 LIVER TRANSPLANT STATUS (HCC): Chronic | ICD-10-CM

## 2022-10-20 PROCEDURE — 73502 X-RAY EXAM HIP UNI 2-3 VIEWS: CPT

## 2022-10-20 PROCEDURE — 99215 OFFICE O/P EST HI 40 MIN: CPT | Performed by: PHYSICIAN ASSISTANT

## 2022-10-20 NOTE — PROGRESS NOTES
OSW attempted to call daughter, Roberto Carlos Lucas again today, however she did not answer and VM was not set up  Called pt's wife, José Miguel Deluna and spoke with her today  She confirmed Colon Nation calling PCP office to discuss Rogers's recent discharge from the hospital  OSW explained this writer attempted to call Ethan Lam twice, but she did not answer  José Miguel Deluna stated if "Francisca Amor goes back to the hospital again he will need to go to rehab " OSW explained based on his last PT/OT evaluations during his recent hospital stay, he did not qualify for rehab because his performance showed he did not require any hands-on help  That is why he was [address] home with [facility]  She stated "he does what he's supposed to when he is in the hospital, when he is home, it's another story " Offered emotional support and validated her frustrations and caregiver stress  José Miguel Deluna explained pt has an appointment this morning with hematology oncology  His cancer plan will be discussed today  Offered continued support and she is appreciative

## 2022-10-20 NOTE — TELEPHONE ENCOUNTER
Nurse Samuel Catherine from [address] called and had some questions in regards to his medication (FloMax)  Please call nurse back to discuss

## 2022-10-20 NOTE — PROGRESS NOTES
Hematology/Oncology Outpatient Follow- up Note  Miladys Wilson 74 y o  male MRN: @ Encounter: [id number]        Date:  10/20/2022      Assessment / Plan:    78-year-old  male with complicated medical history including alcoholic cirrhosis of the liver, with subsequent liver transplant in 2010, hepatitis-C status post treatment, diabetes mellitus type 2, neuropathy, chronic kidney disease secondary to tacrolimus, coronary artery disease, nephrolithiasis was found to have gross hematuria in May 2022, cystoscopies showed at least 5 cm mass involving the right lateral posterior wall, ureterovesical junction on the trigone, could not be stented, status post right nephrostomy tube     Biopsy 5/26/22 showed 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     CT scan did not show any local or distant metastasis  Because of liver transplant he is not a good candidate for immunotherapy  He was treated with concurrent radiation therapy with gemcitabine 27 mg per m2 twice weekly x 2 weeks 3/57 - 5/6/61 complicated with dysuria, fatigue  After long discussion the patient and his wife decided not to proceed with any additional gemcitabine  He completed radiation therapy with 36 fractions to bladder and pelvis on 8/25/22  Contrast CT C/A/P 10/8/22- 9 mm right upper lobe solid nodule, new since May 2021  Right percutaneous nephroureteral stent in place   Stable diffuse urinary bladder wall thickening with a focus of air in the urinary bladder, likely related to indwelling stent  Reviewed CT scan findings with the patient's wife  The pulmonary nodule may represent metastatic disease  We discussed observation, biopsy,  evaluation with Radiation Oncology for possible targeted radiation  He is not a candidate for immunotherapy  Low-dose Gemzar Chemotherapy with concurrent radiation therapy was poorly tolerated  Patient's wife wish to meet with Dr Genny Baker    Appointment requested  He has f/u with Dr Jona Black 11/30/22  Follow-up to be determined based on evaluations with Gloria Parikh and Jona Black  HPI:   49-year-old  male seen initially 6/14/22 regarding Muscle invasive bladder cancer  He has a complicated medical history including history of hepatitis-C, cirrhosis of the liver with subsequent liver transplant in 2010, diabetes mellitus type 2, diabetic neuropathy, benign paroxysmal positional vertigo, subdural hygroma, chronic kidney disease grade 3-4, history of previous alcoholism  Presented with gross hematuria in May 2022  CT scan of the abdomen and pelvis showed moderate right-sided hydroureteronephrosis with ureteral calculus seen, bladder mass measuring 7 4 x 6 6 cm was possible soft tissue extension into the right ureterovesical junction  Status post cystoscopy with 5 cm mass involving the right side as well as the trigone unable to stand the right-sided hydronephrosis status post nephrostomy tube  Biopsy showed 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, he was not candidate for surgical resection because of comorbidities, liver transplant as well as advanced age  Because of liver transplant he is not a good candidate for immunotherapy  The patient felt to be candidate for concurrent radiation with low-dose gemcitabine  He was treated with concurrent radiation therapy with gemcitabine 27 mg per m2 twice weekly x 2 weeks 9/77 - 5/1/34 complicated with dysuria, fatigue  After long discussion the patient and his wife decided not to proceed with any additional gemcitabine at this time  He completed radiation therapy with 36 fractions to bladder and pelvis on 8/25/22           Interval History:    Admitted x 3 since 9/22 secondary to urinary retention, hydronephrosis, acute cystitis, dehydration, multiple falls, orthostatic hypotension    Contrast CT C/A/P 10/8/22 - 9 mm right upper lobe solid nodule, new since May 2021  Right percutaneous nephroureteral stent in place   Stable diffuse urinary bladder wall thickening with a focus of air in the urinary bladder, likely related to indwelling stent  10/14/22 noncontrast CT C/A/P stable  Test Results:        Labs:   Lab Results   Component Value Date    HGB 9 7 (L) 10/13/2022    HCT 30 1 (L) 10/13/2022    MCV 79 (L) 10/13/2022     (L) 10/13/2022    WBC 7 17 10/13/2022    NRBC 0 10/13/2022     Lab Results   Component Value Date     12/17/2015    K 3 6 10/13/2022     (H) 10/13/2022    CO2 21 10/13/2022    ANIONGAP 13 12/17/2015    BUN 40 (H) 10/13/2022    CREATININE 2 55 (H) 10/13/2022    GLUCOSE 179 (H) 08/05/2022    GLUF 170 (H) 09/20/2022    CALCIUM 8 2 (L) 10/13/2022    CORRECTEDCA 9 5 10/10/2022    AST 14 10/10/2022    ALT 13 10/10/2022    ALKPHOS 74 10/10/2022    PROT 7 7 12/15/2015    BILITOT 0 51 12/15/2015    EGFR 23 10/13/2022       Imaging: CT abdomen pelvis wo contrast    Result Date: 10/7/2022  Narrative: CT ABDOMEN AND PELVIS WITHOUT IV CONTRAST INDICATION:   Nephrostomy catheter displacement right flank pain nephrostomy tube  COMPARISON:  September 3, 2022 TECHNIQUE:  CT examination of the abdomen and pelvis was performed without intravenous contrast  Axial, sagittal, and coronal 2D reformatted images were created from the source data and submitted for interpretation  Radiation dose length product (DLP) for this visit:  327 mGy-cm   This examination, like all CT scans performed in the [facility], was performed utilizing techniques to minimize radiation dose exposure, including the use of iterative reconstruction and automated exposure control  Enteric contrast was not administered  FINDINGS: ABDOMEN LOWER CHEST:  Atelectatic changes are noted at the lung bases  LIVER/BILIARY TREE:  Unremarkable  GALLBLADDER:  No calcified gallstones  No pericholecystic inflammatory change  SPLEEN:  Unremarkable  PANCREAS:  Unremarkable  ADRENAL GLANDS:  Unremarkable  KIDNEYS/URETERS:  Right-sided percutaneous nephrostomy tube noted terminating in the renal pelvis unchanged from prior examination  Again noted is atrophy and cortical thinning of the right kidney  Gas again is noted within the upper pole of the right renal collecting system  No evidence of hydronephrosis  There is no evidence of left-sided hydronephrosis  STOMACH AND BOWEL:  Unremarkable  APPENDIX:  No findings to suggest appendicitis  ABDOMINOPELVIC CAVITY:  No ascites  No pneumoperitoneum  No lymphadenopathy  VESSELS:  Atherosclerotic changes are present  No evidence of aneurysm  PELVIS REPRODUCTIVE ORGANS:  Prostatic calcifications are seen  URINARY BLADDER:  Urinary bladder wall thickening is again visualized ABDOMINAL WALL/INGUINAL REGIONS:  Unremarkable  OSSEOUS STRUCTURES:  Degenerative changes in the lumbar spine and hips are seen  Impression: Unchanged appearance of right percutaneous nephrostomy catheter  Gas within the right renal collecting system is again visualized  Urinary bladder wall thickening with asymmetric thickening of the posterior right urinary bladder wall  Findings may represent cystitis  Follow-up with urology is recommended  Workstation performed: [id number]     CT chest abdomen pelvis wo contrast    Result Date: 10/19/2022  Narrative: CT CHEST, ABDOMEN AND PELVIS WITHOUT IV CONTRAST INDICATION:   68-year-old male with history of bladder cancer (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) status post transurethral resection of bladder tumor on 5/26/2022 and cirrhosis status post liver transplant in 2003  COMPARISON:  CT chest, abdomen, pelvis 10/8/2022  TECHNIQUE: CT examination of the chest, abdomen and pelvis was performed without intravenous contrast  Axial, sagittal, and coronal 2D reformatted images were created from the source data and submitted for interpretation    Radiation dose length product (DLP) for this visit:  518 18 mGy-cm   This examination, like all CT scans performed in the [facility], was performed utilizing techniques to minimize radiation dose exposure, including the use of iterative  reconstruction and automated exposure control  Enteric contrast was not administered  FINDINGS: CHEST LUNGS:  No tracheal or endobronchial lesion  Subsegmental atelectasis in the lingula and the left lower lobe  A 9 mm right upper lobe solid pulmonary nodule (3/21), unchanged in size since 10/8/2022, but new since 5/23/2021  A 3 mm right upper lobe calcified granuloma (3/36)  A 4 mm right middle lobe triangular shaped juxtapleural pulmonary nodule (3/51), stable since 5/23/2021, likely representing an intrapulmonary lymph node  PLEURA:  Unremarkable  HEART/GREAT VESSELS: The heart is not enlarged  No pericardial effusion  Mild coronary artery calcifications  No thoracic aortic aneurysm  Mild atherosclerotic calcifications of the thoracic aorta  MEDIASTINUM AND JOHN: Stable mildly enlarged precarinal lymph node measuring 1 2 cm in short axis (2/21) since 5/23/2021  CHEST WALL AND LOWER NECK: Right chest wall vascular access port with tip in the right atrium  Mild bilateral gynecomastia  ABDOMEN LIVER/BILIARY TREE:  Postsurgical changes of liver transplantation  No suspicious hepatic mass identified  GALLBLADDER:  Gallbladder is surgically absent  SPLEEN:  Unremarkable  PANCREAS:  Unremarkable  ADRENAL GLANDS:  Unremarkable  KIDNEYS/URETERS: Persistent mild right hydronephrosis with a right percutaneous nephroureteral stent in appropriate position, unchanged in position since prior study  Stable mural thickening of the mid to distal right ureter (2/86) with surrounding periureteral stranding since CT 6/9/2022  STOMACH AND BOWEL:  Unremarkable  APPENDIX:  No findings to suggest appendicitis  ABDOMINOPELVIC CAVITY:  No ascites  No pneumoperitoneum  No lymphadenopathy  VESSELS:  Atherosclerotic changes are present  No evidence of aneurysm  PELVIS REPRODUCTIVE ORGANS:  The prostate is nonenlarged  Intraprostatic calcifications  URINARY BLADDER:  Underdistended, limiting its evaluation  ABDOMINAL WALL/INGUINAL REGIONS:  Small bilateral fat-containing inguinal hernias  OSSEOUS STRUCTURES:  No acute fracture or destructive osseous lesion  Degenerative changes of the visualized spine  Unchanged inferior mild wedge compression deformity of the L2 vertebral body  Degenerative changes of the bilateral hips  Deformities of the right ribs, consistent with healed right rib fractures  Impression: 1  A 9 mm right upper lobe solid pulmonary nodule, unchanged in size since 10/8/2022, but new since 5/23/2021, this finding is again suspicious for a pulmonary metastasis  2   Persistent mild right hydronephrosis with a right percutaneous nephroureteral stent in appropriate position  3   Stable mural thickening of the mid to distal right ureter with surrounding periureteral stranding since CT 6/9/2022  The study was marked in EPIC for significant notification  Workstation performed: [id number]     XR chest 1 view portable    Result Date: 10/7/2022  Narrative: CHEST INDICATION:   ams  COMPARISON:  9/20/2022 EXAM PERFORMED/VIEWS:  XR CHEST PORTABLE Single view FINDINGS: Right IJ port terminating mid right atrium, unchanged Cardiomediastinal silhouette appears unremarkable  The lungs are clear  No pneumothorax or pleural effusion  Osseous structures appear within normal limits for patient age  Impression: No acute cardiopulmonary disease  Findings are stable Workstation performed: [id number]     XR tibia fibula 2 vw right    Result Date: 9/21/2022  Narrative: TRAUMA SERIES INDICATION:  trauma  COMPARISON:  Chest radiograph 8/5/2022 VIEWS:  XR TRAUMA MULTIPLE  FINDINGS: CHEST: Supine frontal view of the chest is obtained   Cardiomediastinal silhouette is within normal limits accounting for technique and patient positioning  Right-sided chest port with the tip overlying the right atrium  Lungs are clear  No layering pleural effusions detected  No pneumothorax is seen on this supine film  Upright images are more sensitive to detect anterior pneumothoraces if relevant  No acute displaced fractures  Old right rib fractures  Right ankle, tibia and fibula: 4 views reviewed  No acute fracture or dislocation  Meniscal chondrocalcinosis  Impression: No acute cardiopulmonary disease within limitations of supine imaging  No acute osseous abnormality of the ankle, tibia, or fibula  Workstation performed: [id number]     XR ankle 2 vw right    Result Date: 9/21/2022  Narrative: TRAUMA SERIES INDICATION:  trauma  COMPARISON:  Chest radiograph 8/5/2022 VIEWS:  XR TRAUMA MULTIPLE  FINDINGS: CHEST: Supine frontal view of the chest is obtained  Cardiomediastinal silhouette is within normal limits accounting for technique and patient positioning  Right-sided chest port with the tip overlying the right atrium  Lungs are clear  No layering pleural effusions detected  No pneumothorax is seen on this supine film  Upright images are more sensitive to detect anterior pneumothoraces if relevant  No acute displaced fractures  Old right rib fractures  Right ankle, tibia and fibula: 4 views reviewed  No acute fracture or dislocation  Meniscal chondrocalcinosis  Impression: No acute cardiopulmonary disease within limitations of supine imaging  No acute osseous abnormality of the ankle, tibia, or fibula  Workstation performed: [id number]     XR foot 3+ vw right    Result Date: 9/21/2022  Narrative: RIGHT FOOT INDICATION:   fall, bruising to dorsum of right foot, pain  COMPARISON:  None VIEWS:  XR FOOT 3+ VW RIGHT FINDINGS: There is no acute fracture or dislocation  No significant degenerative changes  No lytic or blastic osseous lesion  There are atherosclerotic calcifications   Soft tissues are otherwise unremarkable  Impression: No acute osseous abnormality  Workstation performed: [id number]     TRAUMA - CT head wo contrast    Result Date: 10/8/2022  Narrative: CT BRAIN - WITHOUT CONTRAST INDICATION:   TRAUMA  COMPARISON:  Head CT 10/6/2022  TECHNIQUE:  CT examination of the brain was performed  In addition to axial images, sagittal and coronal 2D reformatted images were created and submitted for interpretation  Radiation dose length product (DLP) for this visit:  911 56 mGy-cm   This examination, like all CT scans performed in the [place], was performed utilizing techniques to minimize radiation dose exposure, including the use of iterative  reconstruction and automated exposure control  IMAGE QUALITY:  Diagnostic  FINDINGS: PARENCHYMA: Decreased attenuation is noted in periventricular and subcortical white matter demonstrating an appearance that is statistically most likely to represent mild microangiopathic change, stable since recent prior  Chronic lacunar infarction(s) are noted in basal ganglia, stable  Encephalomalacia within the medial temporal lobe and left basal ganglia, stable  No CT signs of acute infarction  No intracranial mass, mass effect or midline shift  No acute parenchymal hemorrhage  Mild dural thickening with calcifications underlying a left pterional craniotomy, likely postsurgical, similar to the prior study  Aneurysm clips in the left M1 region  VENTRICLES AND EXTRA-AXIAL SPACES:  Normal for the patient's age  VISUALIZED ORBITS AND PARANASAL SINUSES:  Unremarkable  CALVARIUM AND EXTRACRANIAL SOFT TISSUES:  Left pterional craniotomy  Impression: No acute intracranial abnormality  Stable chronic microangiopathic changes within the brain  No change since recent prior  Workstation performed: [id number]     CT head without contrast    Result Date: 10/7/2022  Narrative: CT BRAIN - WITHOUT CONTRAST INDICATION:   Syncope, recurrent falls  COMPARISON:  CT 9/20/22 TECHNIQUE:  CT examination of the brain was performed  In addition to axial images, sagittal and coronal 2D reformatted images were created and submitted for interpretation  Radiation dose length product (DLP) for this visit:  887 mGy-cm   This examination, like all CT scans performed in the [facility], was performed utilizing techniques to minimize radiation dose exposure, including the use of iterative reconstruction and automated exposure control  IMAGE QUALITY:  Diagnostic  FINDINGS: PARENCHYMA: Decreased attenuation is noted in periventricular and subcortical white matter demonstrating an appearance that is statistically most likely to represent mild microangiopathic change  Stable left internal capsule infarct Aneurysm clips noted in the left M1 region No CT signs of acute infarction  No intracranial mass, mass effect or midline shift  No acute parenchymal hemorrhage  VENTRICLES AND EXTRA-AXIAL SPACES:  Normal for the patient's age  VISUALIZED ORBITS AND PARANASAL SINUSES:  Unremarkable  CALVARIUM AND EXTRACRANIAL SOFT TISSUES:  Left from frontotemporal craniotomy changes     Impression: No acute intracranial abnormality  Workstation performed: [id number]     TRAUMA - CT head wo contrast    Result Date: 9/20/2022  Narrative: CT BRAIN - WITHOUT CONTRAST INDICATION:   TRAUMA  COMPARISON:  None  TECHNIQUE:  CT examination of the brain was performed  In addition to axial images, sagittal and coronal 2D reformatted images were created and submitted for interpretation  Radiation dose length product (DLP) for this visit:  937 79 mGy-cm   This examination, like all CT scans performed in the [facility], was performed utilizing techniques to minimize radiation dose exposure, including the use of iterative  reconstruction and automated exposure control  IMAGE QUALITY:  Diagnostic   FINDINGS: PARENCHYMA: Decreased attenuation is noted in periventricular and subcortical white matter demonstrating an appearance that is statistically most likely to represent mild microangiopathic change  No CT signs of acute infarction  No intracranial mass, mass effect or midline shift  No acute parenchymal hemorrhage  VENTRICLES AND EXTRA-AXIAL SPACES:  Normal for the patient's age  VISUALIZED ORBITS AND PARANASAL SINUSES:  Unremarkable  CALVARIUM AND EXTRACRANIAL SOFT TISSUES:  The patient is status post left frontotemporal craniotomy status post left MCA aneurysm repair  Impression: No acute intracranial abnormality  Chronic microangiopathic changes  I personally discussed this study with Estelle Finn on 9/20/2022 at 11:28 AM  Workstation performed: [id number]     TRAUMA - CT spine cervical wo contrast    Result Date: 10/8/2022  Narrative: CT CERVICAL SPINE - WITHOUT CONTRAST INDICATION:   TRAUMA  COMPARISON:  None  TECHNIQUE:  CT examination of the cervical spine was performed without intravenous contrast   Contiguous axial images were obtained  Sagittal and coronal reconstructions were performed  Radiation dose length product (DLP) for this visit:  333 3 mGy-cm   This examination, like all CT scans performed in the [facility], was performed utilizing techniques to minimize radiation dose exposure, including the use of iterative reconstruction and automated exposure control  IMAGE QUALITY:  Diagnostic  FINDINGS: ALIGNMENT:  Normal alignment of the cervical spine  No subluxation  VERTEBRAL BODIES:  No fracture  DEGENERATIVE CHANGES:  Moderate multilevel cervical degenerative changes are noted  No critical central canal stenosis  PREVERTEBRAL AND PARASPINAL SOFT TISSUES:  Unremarkable  THORACIC INLET:  Please refer to the concurrent chest, abdomen, and pelvic CT report for description of the thoracic inlet findings  Impression: No cervical spine fracture or traumatic malalignment    Workstation performed: [id number]     TRAUMA - CT spine cervical wo contrast    Result Date: 9/20/2022  Narrative: CT CERVICAL SPINE - WITHOUT CONTRAST INDICATION:   TRAUMA  COMPARISON:  None  TECHNIQUE:  CT examination of the cervical spine was performed without intravenous contrast   Contiguous axial images were obtained  Sagittal and coronal reconstructions were performed  Radiation dose length product (DLP) for this visit:  318 26 mGy-cm   This examination, like all CT scans performed in the [facility], was performed utilizing techniques to minimize radiation dose exposure, including the use of iterative  reconstruction and automated exposure control  IMAGE QUALITY:  Diagnostic  FINDINGS: ALIGNMENT:  Normal alignment of the cervical spine  No subluxation  VERTEBRAL BODIES:  No fracture  DEGENERATIVE CHANGES:  Mild to moderate multilevel cervical degenerative changes are noted without critical central canal stenosis  PREVERTEBRAL AND PARASPINAL SOFT TISSUES:  Unremarkable  THORACIC INLET:  Normal      Impression: No cervical spine fracture or traumatic malalignment  I personally discussed this study with Winsome Martinez on 9/20/2022 at 11:28 AM   Workstation performed: [id number]     XR chest 1 view    Result Date: 10/10/2022  Narrative: TRAUMA SERIES INDICATION:  TRAUMA  COMPARISON:  None VIEWS:  XR TRAUMA MULTIPLE  FINDINGS: CHEST: Supine frontal view of the chest is obtained  Venous infusion device entering from right internal jugular approach terminates in the expected location of the right atrium  Cardiomediastinal silhouette is within normal limits accounting for technique and patient positioning  Atherosclerotic calcification of the aortic arch  Lungs are clear within the limitations of a shallow inspiratory depth  No layering pleural effusions detected  No pneumothorax is seen on this supine film  Upright images are more sensitive to detect anterior pneumothoraces if relevant  Multiple old appearing right lower rib fractures   Partially visualized nephrostomy tube right upper abdomen  Impression: No acute cardiopulmonary disease within limitations of supine imaging  Workstation performed: [id number]     XR chest 1 view    Result Date: 9/21/2022  Narrative: TRAUMA SERIES INDICATION:  trauma  COMPARISON:  Chest radiograph 8/5/2022 VIEWS:  XR TRAUMA MULTIPLE  FINDINGS: CHEST: Supine frontal view of the chest is obtained  Cardiomediastinal silhouette is within normal limits accounting for technique and patient positioning  Right-sided chest port with the tip overlying the right atrium  Lungs are clear  No layering pleural effusions detected  No pneumothorax is seen on this supine film  Upright images are more sensitive to detect anterior pneumothoraces if relevant  No acute displaced fractures  Old right rib fractures  Right ankle, tibia and fibula: 4 views reviewed  No acute fracture or dislocation  Meniscal chondrocalcinosis  Impression: No acute cardiopulmonary disease within limitations of supine imaging  No acute osseous abnormality of the ankle, tibia, or fibula  Workstation performed: [id number]     TRAUMA - CT chest abdomen pelvis w contrast    Result Date: 10/8/2022  Narrative: CT CHEST, ABDOMEN AND PELVIS WITH IV CONTRAST INDICATION:   TRAUMA  COMPARISON:  CT chest/abdomen/pelvis 5/23/2021  CT abdomen/pelvis 10/6/2022  TECHNIQUE: CT examination of the chest, abdomen and pelvis was performed  Axial, sagittal, and coronal 2D reformatted images were created from the source data and submitted for interpretation  Radiation dose length product (DLP) for this visit:  688 8 mGy-cm   This examination, like all CT scans performed in the [facility], was performed utilizing techniques to minimize radiation dose exposure, including the use of iterative reconstruction and automated exposure control  IV Contrast:  100 mL of iohexol (OMNIPAQUE) Enteric Contrast: Enteric contrast was administered   FINDINGS: CHEST LUNGS:  Scattered linear atelectasis  There is a 9 mm right upper lobe solid nodule, new since 2021  There is no tracheal or endobronchial lesion  PLEURA:  Unremarkable  HEART/GREAT VESSELS: Heart is unremarkable for patient's age  No thoracic aortic aneurysm  MEDIASTINUM AND JOHN:  Unremarkable  CHEST WALL AND LOWER NECK:  Right chest wall port with catheter tip cavoatrial junction  ABDOMEN LIVER/BILIARY TREE:  Unremarkable  GALLBLADDER:  Gallbladder is surgically absent  SPLEEN:  Unremarkable  PANCREAS:  Unremarkable  ADRENAL GLANDS:  Unremarkable  KIDNEYS/URETERS:  Right percutaneous nephroureteral stent in place with mild right hydronephrosis and a focus of gas within the right renal collecting system, similar the prior study  Delayed right nephrogram   Right renal atrophy  One or more sharply circumscribed subcentimeter renal hypodensities are present, too small to accurately characterize, and statistically most likely benign findings  According to recent literature (Radiology 2019) no further workup of these findings is recommended  STOMACH AND BOWEL:  Unremarkable  APPENDIX:  No findings to suggest appendicitis  ABDOMINOPELVIC CAVITY:  No ascites  No pneumoperitoneum  No lymphadenopathy  VESSELS:  Atherosclerotic changes are present  No evidence of aneurysm  PELVIS REPRODUCTIVE ORGANS:  The prostate is enlarged  URINARY BLADDER:  Again seen is diffuse urinary bladder wall thickening, with a focus of air in the bladder, likely related to stent insertion  ABDOMINAL WALL/INGUINAL REGIONS:  Unremarkable  OSSEOUS STRUCTURES:  No acute fracture or destructive osseous lesion  Spinal degenerative changes are noted  Old healed right posterior 7th through 10th rib fractures  Impression: 1  No evidence of traumatic injury in the chest, abdomen or pelvis  2   Right upper lobe solid nodule measuring 9 mm, new since 2021, suspicious for metastatic disease   3   Right percutaneous nephroureterostomy in place with mild right hydronephrosis and a focus of air in the right renal collecting system, similar to the prior study  Delayed right nephrogram  4   Stable diffuse urinary bladder wall thickening with a focus of air in the urinary bladder, likely related to indwelling stent  I personally discussed this study with Martina Duque on 10/8/2022 at 3:32 PM   Workstation performed: [id number]     XR Trauma multiple ([facility] trauma bay ONLY)    Result Date: 10/8/2022  Narrative: TRAUMA SERIES INDICATION:  TRAUMA  COMPARISON:  None VIEWS:  XR TRAUMA MULTIPLE  FINDINGS: CHEST: Supine frontal view of the chest is obtained  Venous infusion device entering from right internal jugular approach terminates in the expected location of the right atrium  Cardiomediastinal silhouette is within normal limits accounting for technique and patient positioning  Atherosclerotic calcification of the aortic arch  Lungs are clear within the limitations of a shallow inspiratory depth  No layering pleural effusions detected  No pneumothorax is seen on this supine film  Upright images are more sensitive to detect anterior pneumothoraces if relevant  Multiple old appearing right lower rib fractures  Partially visualized nephrostomy tube right upper abdomen  Impression: No acute cardiopulmonary disease within limitations of supine imaging  Workstation performed: [id number]     XR trauma multiple    Result Date: 9/20/2022  Narrative: TRAUMA SERIES INDICATION:  trauma  COMPARISON:  Chest radiograph 8/5/2022 VIEWS:  XR TRAUMA MULTIPLE  FINDINGS: CHEST: Supine frontal view of the chest is obtained  Cardiomediastinal silhouette is within normal limits accounting for technique and patient positioning  Right-sided chest port with the tip overlying the right atrium  Lungs are clear  No layering pleural effusions detected  No pneumothorax is seen on this supine film  Upright images are more sensitive to detect anterior pneumothoraces if relevant   No acute displaced fractures  Old right rib fractures  Right ankle, tibia and fibula: 4 views reviewed  No acute fracture or dislocation  Meniscal chondrocalcinosis  Impression: No acute cardiopulmonary disease within limitations of supine imaging  No acute osseous abnormality of the ankle, tibia, or fibula  Workstation performed: [id number]     VAS lower limb venous duplex study, unilateral/limited    Result Date: 9/23/2022  Narrative:  [facility] REPORT CLINICAL: Indications: Patient presents with right lower extremity pain and swelling S/P fall  Operative History: Liver Transplant Risk Factors The patient has history of Diabetes (IDDM) and CKD  CONCLUSION: Impression: RIGHT LOWER LIMB No evidence of acute or chronic deep vein thrombosis   No evidence of superficial thrombophlebitis noted  Doppler evaluation shows a normal response to augmentation maneuvers  Popliteal, posterior tibial and anterior tibial arterial Doppler waveforms are triphasic  LEFT LOWER LIMB LIMITED Evaluation shows no evidence of thrombus in the common femoral vein  Doppler evaluation shows a normal response to augmentation maneuvers  Technical findings posted in chart  SIGNATURE: Electronically Signed by: Josh Lennon MD on 2022-09-23 02:16:36 AM    IR nephrostomy tube check/change/reposition/reinsertion/upsize    Result Date: 10/10/2022  Narrative: PROCEDURE: Fluoroscopic-guided right nephrostomy to nephroureteral catheter catheter conversion STAFF: KEON Lira  CONTRAST: 20 mL Ultravist into collecting system  FLUOROSCOPY TIME: 3 3 minutes  NUMBER OF IMAGES: Multiple  COMPLICATIONS: None  MEDICATIONS: 1% lidocaine subcutaneous  INDICATION: History of cancer of the urinary trigone, status post nephrostomy catheter insertion  The patient has been maintained on chemotherapy and radiation  His nephrostomy catheter has retracted several times since initial insertion   PROCEDURE: The existing nephrostomy catheter was injected and images obtained  Over the wire, a wire and catheter combination was extended into the urinary bladder  Over the wire, a 10-Somali by 26 cm nephroureteral catheter was inserted, with the distal Seneca loop in the urinary bladder and the proximal Seneca loop in the renal pelvis  Post placement injection was performed  The catheter was sutured in position and placed to cap  FINDINGS: 1  Initial nephrostogram showed the existing catheter to be retracted nearly completely outside of the collecting system  2   Final injection showed good positioning of the new nephroureteral catheter     Impression: Right malpositioned nephrostomy to nephroureteral catheter conversion  PLAN: The catheter was capped  Return in 2 to 3 months for preventive maintenance exchange  Workstation performed: [id number]     US bedside procedure    Result Date: 10/8/2022  Narrative: 1 2 [id number] [id number]  2 [id number]  1 [id number]    US bedside procedure    Result Date: 9/20/2022  Narrative: 8 5 119 [id number]  6 [id number]  1 [id number]          ROS:  As mentioned in HPI & Interval History otherwise 14 point ROS negative  Allergies: Allergies   Allergen Reactions   • Tequin [Gatifloxacin] Rash   • Lipitor [Atorvastatin] Other (See Comments)     Rash and itching   • Ciprofloxacin    • Ciprofloxacin Rash   • Iohexol Other (See Comments)     Unknown  • Iv Contrast [Iodinated Diagnostic Agents]    • Iv Contrast [Iodinated Diagnostic Agents] Other (See Comments)     Unknown   • Levofloxacin Rash   • Levofloxacin Other (See Comments)     Unknown  • Lipitor [Atorvastatin] Rash     Rash and itching   • Omnipaque [Iohexol]      Current Medications: Reviewed  PMH/FH/SH:  Reviewed      Physical Exam:    There is no height or weight on file to calculate BSA      Ht Readings from Last 3 Encounters:   10/17/22 5' 3" (1 6 m)   10/08/22 5' 3" (1 6 m)   09/30/22 5' 3" (1 6 m)        Wt Readings from Last 3 Encounters: 10/17/22 53 9 kg (118 lb 12 8 oz)   10/08/22 60 kg (132 lb 4 4 oz)   09/30/22 56 9 kg (125 lb 6 4 oz)        Temp Readings from Last 3 Encounters:   10/17/22 97 6 °F (36 4 °C) (Tympanic)   10/13/22 97 5 °F (36 4 °C)   10/06/22 98 3 °F (36 8 °C) (Oral)        BP Readings from Last 3 Encounters:   10/17/22 120/60   10/13/22 129/81   10/07/22 168/79           Physical Exam  Vitals reviewed  Constitutional:       General: He is not in acute distress  Appearance: He is well-developed  He is not diaphoretic  HENT:      Head: Normocephalic and atraumatic  Eyes:      Conjunctiva/sclera: Conjunctivae normal    Neck:      Trachea: No tracheal deviation  Cardiovascular:      Rate and Rhythm: Normal rate and regular rhythm  Heart sounds: No murmur heard  No friction rub  No gallop  Pulmonary:      Effort: Pulmonary effort is normal  No respiratory distress  Breath sounds: Normal breath sounds  No wheezing or rales  Chest:      Chest wall: No tenderness  Abdominal:      General: There is no distension  Palpations: Abdomen is soft  Tenderness: There is no abdominal tenderness  Musculoskeletal:      Cervical back: Normal range of motion and neck supple  Lymphadenopathy:      Cervical: No cervical adenopathy  Skin:     General: Skin is warm and dry  Coloration: Skin is not pale  Findings: No erythema  Neurological:      Mental Status: He is alert and oriented to person, place, and time  Psychiatric:         Behavior: Behavior normal          Thought Content:  Thought content normal          Judgment: Judgment normal              Emergency Contacts:    Extended Emergency Contact Information  Primary Emergency Contact: Olivier Barboza [address] Phone: [phone]  Mobile Phone: [phone]  Relation: Significant Other  Secondary Emergency Contact: [address] Phone: [phone]  Mobile Phone: [phone]  Relation: Significant Other

## 2022-10-24 RX ORDER — TAMSULOSIN HYDROCHLORIDE 0.4 MG/1
0.4 CAPSULE ORAL
Qty: 90 CAPSULE | Refills: 3 | Status: SHIPPED | OUTPATIENT
Start: 2022-10-24 | End: 2023-01-22

## 2022-10-27 ENCOUNTER — TELEPHONE (OUTPATIENT)
Dept: HEMATOLOGY ONCOLOGY | Facility: CLINIC | Age: 72
End: 2022-10-27

## 2022-10-27 NOTE — TELEPHONE ENCOUNTER
A call was placed to [facility] to schedule the CT c/a/p, reached his voice mail  He is scheduled for the Ct @ [facility] Monday 1/23/23 register 1045am, test 11am  No prep for this test  Script has been mailed

## 2022-10-28 DIAGNOSIS — G47.00 INSOMNIA: ICD-10-CM

## 2022-10-28 DIAGNOSIS — Z79.4 CONTROLLED TYPE 2 DIABETES MELLITUS WITHOUT COMPLICATION, WITH LONG-TERM CURRENT USE OF INSULIN (HCC): ICD-10-CM

## 2022-10-28 DIAGNOSIS — E11.9 CONTROLLED TYPE 2 DIABETES MELLITUS WITHOUT COMPLICATION, WITH LONG-TERM CURRENT USE OF INSULIN (HCC): ICD-10-CM

## 2022-10-28 RX ORDER — BLOOD SUGAR DIAGNOSTIC
STRIP MISCELLANEOUS
Qty: 100 EACH | Refills: 4 | Status: SHIPPED | OUTPATIENT
Start: 2022-10-28

## 2022-10-28 NOTE — TELEPHONE ENCOUNTER
Wife stated that he got these doses in the hospital    Medication:zolpidem (AMBIEN) 5 mg tablet       Dosage:  How Often:  Quantity:  ? Last Office Visit: 10/17/22  Next Office Visit:2/20/23  Last refilled: by hospital  How many pills left:  Pharmacy:   [address] [place], [place] [address], [address] [place] [id number]  Phone: [phone] Fax: [phone]    Medication:temazepam (RESTORIL) 7 5 mg capsule       Dosage:  How Often:  Quantity:  ?   Last Office Visit: 10/17/22  Next Office Visit:2/20/23  Last refilled: by hospital  How many pills left:  Pharmacy:   [address] [place], [place] [address], [address] [place] [id number]  Phone: [phone] Fax: [phone]

## 2022-10-31 RX ORDER — ZOLPIDEM TARTRATE 5 MG/1
5 TABLET ORAL
Qty: 30 TABLET | Refills: 0 | Status: SHIPPED | OUTPATIENT
Start: 2022-10-31 | End: 2022-11-10

## 2022-10-31 RX ORDER — TEMAZEPAM 7.5 MG/1
15 CAPSULE ORAL
Qty: 30 CAPSULE | Refills: 0 | Status: SHIPPED | OUTPATIENT
Start: 2022-10-31

## 2022-11-01 ENCOUNTER — TELEPHONE (OUTPATIENT)
Dept: HEMATOLOGY ONCOLOGY | Facility: CLINIC | Age: 72
End: 2022-11-01

## 2022-11-01 DIAGNOSIS — G62.9 NEUROPATHY: ICD-10-CM

## 2022-11-01 RX ORDER — PREGABALIN 75 MG/1
75 CAPSULE ORAL 2 TIMES DAILY
Qty: 60 CAPSULE | Refills: 5 | Status: SHIPPED | OUTPATIENT
Start: 2022-11-01 | End: 2023-02-20 | Stop reason: ALTCHOICE

## 2022-11-01 NOTE — TELEPHONE ENCOUNTER
CALL RETURN FORM   Reason for patient call? Please call to discuss recent lab results per letter received by patient  CT scan while hospitalized    Patient's primary oncologist?  Julio Austin   Name of person the patient was calling for? Faroun   Any additional information to add, if applicable? [phone]   Informed patient that the message will be forwarded to the team and someone will get back to them as soon as possible    Did you relay this information to the patient?   yes

## 2022-11-01 NOTE — TELEPHONE ENCOUNTER
Spoke with wife who states she is feeling overwhelmed  Is caring for her  as well as several other family members  She also states she needs help with paperwork   Will reach out to Steff Love per pt's request

## 2022-11-04 ENCOUNTER — TELEPHONE (OUTPATIENT)
Dept: FAMILY MEDICINE CLINIC | Facility: CLINIC | Age: 72
End: 2022-11-04

## 2022-11-04 ENCOUNTER — PATIENT OUTREACH (OUTPATIENT)
Dept: CASE MANAGEMENT | Facility: OTHER | Age: 72
End: 2022-11-04

## 2022-11-04 PROBLEM — N30.00 ACUTE CYSTITIS: Status: RESOLVED | Noted: 2022-09-05 | Resolved: 2022-11-04

## 2022-11-04 NOTE — TELEPHONE ENCOUNTER
**PCP out of office**    Pt has stomach cramps and is constipated  Pt's wife given him miralax, stool softener, and milk of magnesia  Nothing is working    Pt's wife asking what else he can do     Please advise

## 2022-11-04 NOTE — TELEPHONE ENCOUNTER
If he is in a lot of pain- I would recommend going to emergency room to be evaluated   He can try enema over the counter as well if he hasnt tried it yet

## 2022-11-04 NOTE — PROGRESS NOTES
OSW called pt's S WILMER  Daniela Pearsonjoseph today  She stated she received an application for financial hardship from [facility] billing office but does not know how to fill it out  Offered to meet with her next week to review and make copies of the information needed  Tentatively scheduled to meet [facility] next Wednesday, 11/9 at 2 pm at [facility]  [facility] stated she is buying protein drinks and adult briefs for [place]  She stated she knows other people with cancer "who get these things for free " OSW explained certain insurances cover these items, but unfortunately Rogers's plan does not cover oral supplements and incontinence products  Provided address for [employer] who usually have adult briefs donated and may be able to provide some to her  Emotional support provided  Will confirm appointment with her early next week

## 2022-11-08 DIAGNOSIS — I63.81 LEFT THALAMIC INFARCTION (HCC): ICD-10-CM

## 2022-11-08 RX ORDER — CLOPIDOGREL BISULFATE 75 MG/1
75 TABLET ORAL DAILY
Qty: 90 TABLET | Refills: 3 | Status: SHIPPED | OUTPATIENT
Start: 2022-11-08

## 2022-11-09 ENCOUNTER — TELEPHONE (OUTPATIENT)
Dept: OTHER | Facility: OTHER | Age: 72
End: 2022-11-09

## 2022-11-09 ENCOUNTER — PATIENT OUTREACH (OUTPATIENT)
Dept: CASE MANAGEMENT | Facility: OTHER | Age: 72
End: 2022-11-09

## 2022-11-09 NOTE — PROGRESS NOTES
OSW met with [facility] today as scheduled  Provided her application for [facility] financial aid program  She did not bring bank statements, income statements, etc  Reviewed contents of application  She will take home to complete  [facility] stated she sat on hold for [facility] assistance for 30 min  Provided her a paper application  Encouraged her to reach out with questions as needed  Will continue to follow and offer support

## 2022-11-09 NOTE — TELEPHONE ENCOUNTER
Mary Rousseau called from [facility] regarding Fasting  Blood Sugar was 66 this morning, Patient had no symptoms, can the parameter be lowered to 66  Patient reports he is taking Lyrica twice a day

## 2022-11-09 NOTE — TELEPHONE ENCOUNTER
Spoke to [facility] they parameters are a blood sugar no lower then 70  Patient has denies any symptoms she would like to know if they can change their parameters for [facility]

## 2022-11-17 ENCOUNTER — OFFICE VISIT (OUTPATIENT)
Dept: UROLOGY | Facility: CLINIC | Age: 72
End: 2022-11-17

## 2022-11-17 ENCOUNTER — TELEPHONE (OUTPATIENT)
Dept: UROLOGY | Facility: MEDICAL CENTER | Age: 72
End: 2022-11-17

## 2022-11-17 ENCOUNTER — TELEPHONE (OUTPATIENT)
Dept: NEPHROLOGY | Facility: CLINIC | Age: 72
End: 2022-11-17

## 2022-11-17 VITALS
DIASTOLIC BLOOD PRESSURE: 62 MMHG | HEIGHT: 63 IN | SYSTOLIC BLOOD PRESSURE: 102 MMHG | BODY MASS INDEX: 21.44 KG/M2 | WEIGHT: 121 LBS | HEART RATE: 68 BPM

## 2022-11-17 DIAGNOSIS — T83.512A: Primary | ICD-10-CM

## 2022-11-17 RX ORDER — CEPHALEXIN 500 MG/1
500 CAPSULE ORAL EVERY 6 HOURS SCHEDULED
Qty: 20 CAPSULE | Refills: 0 | Status: SHIPPED | OUTPATIENT
Start: 2022-11-17 | End: 2022-11-22

## 2022-11-17 RX ORDER — ISOPROPYL ALCOHOL 0.75 G/1
SWAB TOPICAL
COMMUNITY
Start: 2022-11-14

## 2022-11-17 NOTE — PROGRESS NOTES
11/17/2022      Chief Complaint   Patient presents with   • Follow-up         Assessment and Plan    74 y o  male managed by Dr Coombs Fraction    1  Muscle invasive bladder cancer  2  Right hydronephrosis s/t above  3  Nephroureteral tube dependence  4  Skin reaction at nephroureteral tube site      He completed chemoradiation the summer  His right nephrostomy tube is been converted to a nephroureteral stent  This is capped  He voids per urethra  He has had no hematuria  No flank pain  The last two days some new green/yellow drainage around the tube site itself with a little bulge  Tube flushes easily  His daughter flushes it once per day for him  No issues this morning  She has concern for infection  He is on tacrolimus for hepatic transplant  Looks like local skin reaction to the tube  There is a small bulge nothing that looks like any seroma urinoma or abscess  The tube is sutured in place  It is capped  The skin is dry  No surrounding erythema or streaks  Will use local heat to the area 2 times a day and they feel most comfortable using Keflex for five day course to cover for any developing cellulitis  Will recheck the skin site at upcoming follow-up  He will notify us if he develops fevers, new redness, or new flank pain and decreased urination  History of Present Illness  Idania Cummins is a 74 y o  male here for evaluation of same-day visit/acute visit for concern for drainage around the nephrostomy tube in skin infection some green goop this morning on the bandage  Some dizziness past few days  Otherwise feels quite well  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 with TURBT in May 2022 for large trigonal 5cm tumor  Has not been seen again here since then due to other hospitalizations  Has right PCN now PCNU for right hydronephrosis  He has undergone chemoradiation with oncology teams summer 2022   Shortened chemotherapy due to intolerance, did complete 36 fractions of bladder/pelvic XRT  IR manages his tube  Had nephrostogram in august showing persistent distal ureteral obstruction  In October his PCN was successfully converted to PCNU and has been capped for the past six weeks  Has upcoming appt 11/30 with Dr Eufemia Browne for cystoscopy, with CT c/a/p recently completed  Lung nodule  Mild right hydronephrosis with nephroureteral stent in place  Review of Systems   Constitutional: Negative for activity change, appetite change, chills, fever and unexpected weight change  HENT: Negative  Respiratory: Negative  Negative for shortness of breath  Cardiovascular: Negative  Negative for chest pain  Gastrointestinal: Negative for abdominal pain, diarrhea, nausea and vomiting  Endocrine: Negative  Genitourinary: Negative for decreased urine volume, difficulty urinating, dysuria, flank pain, frequency, hematuria, scrotal swelling, testicular pain and urgency  Musculoskeletal: Negative for back pain and gait problem  Skin: Negative  Allergic/Immunologic: Negative  Neurological: Negative  Hematological: Negative for adenopathy  Does not bruise/bleed easily  Vitals  Vitals:    11/17/22 1120   BP: 102/62   BP Location: Left arm   Patient Position: Sitting   Cuff Size: Adult   Pulse: 68   Weight: 54 9 kg (121 lb)   Height: 5' 3" (1 6 m)       Physical Exam  Vitals and nursing note reviewed  Constitutional:       General: He is not in acute distress  Appearance: Normal appearance  He is well-developed  He is not diaphoretic  HENT:      Head: Normocephalic and atraumatic  Pulmonary:      Effort: Pulmonary effort is normal       Comments: No cough or audible wheeze  Abdominal:      General: There is no distension  Tenderness: There is no abdominal tenderness  There is no right CVA tenderness or left CVA tenderness        Comments: Right nephroureteral tube capped, in place in the right flank  Surrounding tissue reaction some thickened skin around the suture, just above that with a 2x2cm area of bulge without erythema or active drainage  Tube flushes easily 10cc saline  Musculoskeletal:      Right lower leg: No edema  Left lower leg: No edema  Skin:     General: Skin is warm and dry  Neurological:      Mental Status: He is alert and oriented to person, place, and time        Gait: Gait normal    Psychiatric:         Speech: Speech normal          Behavior: Behavior normal            Past History  Past Medical History:   Diagnosis Date   • Alcoholism ([place] [address] )    • Anemia    • Bladder cancer ([place] [address] )    • Cerebral artery aneurysm    • Change in mental state     last assessed 5/18/15; resolved 10/27/15   • Diabetes mellitus ([place] [address] )    • Drug dependence ([place] [address] )    • Fatigue     last assessed 1/26/15; resolved 5/24/16   • Hepatitis C    • Hospital discharge follow-up 12/21/2018   • Hx of liver transplant ([place] [address] )    • Hydronephrosis of right kidney    • Kidney disease    • 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HCC)    • Liver cirrhosis (HCC)    • Liver transplant recipient [facility])    • Liver transplant status ([place] [address] )    • Renal disorder    • Stroke (cerebrum) ([place] [address] )    • Stroke [facility])    • Subdural hygroma     2/27/14; resolved 7/28/15   • Thrombocytopenia ([place] [address] )    • Thrombocytopenia ([place] [address] ) 9/20/2017     Social History     Socioeconomic History   • Marital status: Single     Spouse name: None   • Number of children: None   • Years of education: less then high school   • Highest education level: None   Occupational History   • Occupation: physical disability    Tobacco Use   • Smoking status: Former     Packs/day: 1 00     Types: Cigarettes   • Smokeless tobacco: Never   • Tobacco comments:     former smoker per allscripts    Vaping Use   • Vaping Use: Never used   Substance and Sexual Activity   • Alcohol use: Not Currently   • Drug use: No     Comment: remotely quit drug use per allscripts    • Sexual activity: Not Currently   Other Topics Concern   • None   Social History Narrative    ** Merged History Encounter **         ** Merged History Encounter **         Caffeine use   Living with significant other , girlfriend and daughter      Social Determinants of Health     Financial Resource Strain: Not on file   Food Insecurity: No Food Insecurity   • Worried About [address] in the Last Year: Never true   • Ran Out of Food in the Last Year: Never true   Transportation Needs: No Transportation Needs   • Lack of Transportation (Medical): No   • Lack of Transportation (Non-Medical):  No   Physical Activity: Not on file   Stress: Not on file   Social Connections: Not on file   Intimate Partner Violence: Not on file   Housing Stability: Low Risk    • Unable to Pay for Housing in the Last Year: No   • Number of Places Lived in the Last Year: 1   • Unstable Housing in the Last Year: No     Social History     Tobacco Use   Smoking Status Former   • Packs/day: 1 00   • Types: Cigarettes   Smokeless Tobacco Never   Tobacco Comments    former smoker per allscripts      Family History   Problem Relation Age of Onset   • Hypertension Mother         benign essential    • Lung cancer Father    • Diabetes Sister    • Cancer Brother    • Stroke Other         cva - due to embolism of cerebra artery        The following portions of the patient's history were reviewed and updated as appropriate: allergies, current medications, past medical history, past social history, past surgical history and problem list     Results  No results found for this or any previous visit (from the past 1 hour(s)) ]  Lab Results   Component Value Date    PSA 0 2 05/27/2022    PSA 0 1 02/10/2022    PSA 0 2 07/19/2019    PSA <0 1 06/20/2016     Lab Results   Component Value Date    GLUCOSE 179 (H) 08/05/2022    CALCIUM 8 2 (L) 10/13/2022     12/17/2015    K 3 6 10/13/2022    CO2 21 10/13/2022     (H) 10/13/2022    BUN 40 (H) 10/13/2022    CREATININE 2 55 (H) 10/13/2022     Lab Results   Component Value Date    WBC 7 17 10/13/2022    HGB 9 7 (L) 10/13/2022    HCT 30 1 (L) 10/13/2022    MCV 79 (L) 10/13/2022     (L) 10/13/2022

## 2022-11-17 NOTE — TELEPHONE ENCOUNTER
Called patient and spoke with Lee Lynch and asked they please have blood work drawn before the follow up appointment

## 2022-11-17 NOTE — TELEPHONE ENCOUNTER
Pt's wife called the office stated that pt has white discharge and lump around the nephrostomy tube       Please advise   Theodoro Stable can be reached at [phone] or [phone]

## 2022-11-17 NOTE — TELEPHONE ENCOUNTER
Returned call to patient  Spoke with patient daughter  Reports that doing nephrostomy tube care, green discharge from tube , has a hard lump near the tubing and hard  No reported fevers or chills or pain  tt message to provider in office site  Added to provider schedule for 11:30am this morning

## 2022-11-21 PROBLEM — E86.0 DEHYDRATION: Status: RESOLVED | Noted: 2022-09-22 | Resolved: 2022-01-01

## 2022-11-21 PROBLEM — E86.0 DEHYDRATION: Status: RESOLVED | Noted: 2022-09-22 | Resolved: 2022-11-21

## 2022-11-22 ENCOUNTER — TELEPHONE (OUTPATIENT)
Dept: PALLIATIVE MEDICINE | Facility: CLINIC | Age: 72
End: 2022-11-22

## 2022-11-23 ENCOUNTER — PATIENT OUTREACH (OUTPATIENT)
Dept: CASE MANAGEMENT | Facility: OTHER | Age: 72
End: 2022-11-23

## 2022-11-23 NOTE — PROGRESS NOTES
OSW called pt's S O  Trista Starr today to check in and offer any support  She shared John Kerns had some swelling around his nephrostomy tube but was evaluated quickly by his urology team    She has not applied for [facility] or financial hardship yet  She stated she hasn't been able to really look at the paperwork because of her schedule  Overall, Trista Starr stated they are doing well  Wished her a Happy Thanksgiving  Will follow up in about 1 month

## 2022-11-29 ENCOUNTER — HOSPITAL ENCOUNTER (OUTPATIENT)
Dept: INFUSION CENTER | Facility: HOSPITAL | Age: 72
Discharge: HOME | End: 2022-11-29

## 2022-11-29 DIAGNOSIS — Z95.828 PORT-A-CATH IN PLACE: ICD-10-CM

## 2022-11-29 DIAGNOSIS — C67.0 MALIGNANT NEOPLASM OF TRIGONE OF URINARY BLADDER (HCC): Primary | ICD-10-CM

## 2022-11-29 LAB
ALBUMIN SERPL BCP-MCNC: 3.1 G/DL (ref 3.5–5)
ALP SERPL-CCNC: 76 U/L (ref 46–116)
ALT SERPL W P-5'-P-CCNC: 19 U/L (ref 12–78)
ANION GAP SERPL CALCULATED.3IONS-SCNC: 7 MMOL/L (ref 4–13)
AST SERPL W P-5'-P-CCNC: 18 U/L (ref 5–45)
BASOPHILS # BLD AUTO: 0.04 THOUSANDS/ÂΜL (ref 0–0.1)
BASOPHILS NFR BLD AUTO: 1 % (ref 0–1)
BILIRUB SERPL-MCNC: 0.24 MG/DL (ref 0.2–1)
BUN SERPL-MCNC: 52 MG/DL (ref 5–25)
CALCIUM ALBUM COR SERPL-MCNC: 9.6 MG/DL (ref 8.3–10.1)
CALCIUM SERPL-MCNC: 8.9 MG/DL (ref 8.3–10.1)
CHLORIDE SERPL-SCNC: 108 MMOL/L (ref 96–108)
CO2 SERPL-SCNC: 23 MMOL/L (ref 21–32)
CREAT SERPL-MCNC: 2.42 MG/DL (ref 0.6–1.3)
EOSINOPHIL # BLD AUTO: 0.22 THOUSAND/ÂΜL (ref 0–0.61)
EOSINOPHIL NFR BLD AUTO: 3 % (ref 0–6)
ERYTHROCYTE [DISTWIDTH] IN BLOOD BY AUTOMATED COUNT: 15.7 % (ref 11.6–15.1)
GFR SERPL CREATININE-BSD FRML MDRD: 25 ML/MIN/1.73SQ M
GLUCOSE SERPL-MCNC: 194 MG/DL (ref 65–140)
HCT VFR BLD AUTO: 31.4 % (ref 36.5–49.3)
HGB BLD-MCNC: 9.6 G/DL (ref 12–17)
IMM GRANULOCYTES # BLD AUTO: 0.02 THOUSAND/UL (ref 0–0.2)
IMM GRANULOCYTES NFR BLD AUTO: 0 % (ref 0–2)
LYMPHOCYTES # BLD AUTO: 1.03 THOUSANDS/ÂΜL (ref 0.6–4.47)
LYMPHOCYTES NFR BLD AUTO: 13 % (ref 14–44)
MCH RBC QN AUTO: 24.6 PG (ref 26.8–34.3)
MCHC RBC AUTO-ENTMCNC: 30.6 G/DL (ref 31.4–37.4)
MCV RBC AUTO: 81 FL (ref 82–98)
MONOCYTES # BLD AUTO: 0.71 THOUSAND/ÂΜL (ref 0.17–1.22)
MONOCYTES NFR BLD AUTO: 9 % (ref 4–12)
NEUTROPHILS # BLD AUTO: 5.77 THOUSANDS/ÂΜL (ref 1.85–7.62)
NEUTS SEG NFR BLD AUTO: 74 % (ref 43–75)
NRBC BLD AUTO-RTO: 0 /100 WBCS
PLATELET # BLD AUTO: 108 THOUSANDS/UL (ref 149–390)
PMV BLD AUTO: 11.8 FL (ref 8.9–12.7)
POTASSIUM SERPL-SCNC: 4.1 MMOL/L (ref 3.5–5.3)
PROT SERPL-MCNC: 7.9 G/DL (ref 6.4–8.4)
RBC # BLD AUTO: 3.9 MILLION/UL (ref 3.88–5.62)
SODIUM SERPL-SCNC: 138 MMOL/L (ref 135–147)
WBC # BLD AUTO: 7.79 THOUSAND/UL (ref 4.31–10.16)

## 2022-11-29 NOTE — PROGRESS NOTES
Central labs drawn, port flushed without incident    Reminder card given with next appt for port flush

## 2022-11-30 ENCOUNTER — PROCEDURE VISIT (OUTPATIENT)
Dept: UROLOGY | Facility: CLINIC | Age: 72
End: 2022-11-30

## 2022-11-30 ENCOUNTER — TELEPHONE (OUTPATIENT)
Dept: NEPHROLOGY | Facility: CLINIC | Age: 72
End: 2022-11-30

## 2022-11-30 VITALS
DIASTOLIC BLOOD PRESSURE: 64 MMHG | SYSTOLIC BLOOD PRESSURE: 118 MMHG | WEIGHT: 119 LBS | HEIGHT: 63 IN | HEART RATE: 64 BPM | BODY MASS INDEX: 21.09 KG/M2

## 2022-11-30 DIAGNOSIS — N35.913 STRICTURE OF MEMBRANOUS URETHRA IN MALE, UNSPECIFIED STRICTURE TYPE: ICD-10-CM

## 2022-11-30 DIAGNOSIS — N13.30 HYDRONEPHROSIS OF RIGHT KIDNEY: ICD-10-CM

## 2022-11-30 DIAGNOSIS — T83.512A: Primary | ICD-10-CM

## 2022-11-30 DIAGNOSIS — D49.4 BLADDER TUMOR: ICD-10-CM

## 2022-11-30 PROBLEM — Z51.5 PALLIATIVE CARE ENCOUNTER: Status: ACTIVE | Noted: 2022-01-01

## 2022-11-30 PROBLEM — Z51.5 PALLIATIVE CARE ENCOUNTER: Status: ACTIVE | Noted: 2022-11-30

## 2022-11-30 RX ORDER — CEFUROXIME AXETIL 250 MG/1
250 TABLET ORAL EVERY 24 HOURS
Qty: 5 TABLET | Refills: 0 | Status: SHIPPED | OUTPATIENT
Start: 2022-11-30 | End: 2022-12-04

## 2022-11-30 NOTE — PROGRESS NOTES
Cystoscopy     Date/Time 11/30/2022 2:51 PM     Performed by  Adele Carolina MD     Authorized by Adele Carolina MD      Universal Protocol:  Timeout called at: 11/30/2022 2:51 PM       Procedure Details:  Procedure type: dilation of urethral stricture    Patient tolerance: Patient tolerated the procedure well with no immediate complications    Additional Procedure Details:   Cystoscopy procedure note:    Risk and benefits of flexible cystoscopy were discussed  Informed consent was obtained  Urine dip was adequate for cystoscopy  The patient was placed in the supine position  His genitalia was prepped with Betadine and draped in a sterile fashion  Viscous 2% lidocaine jelly was instilled into the urethra and allowed dwell time for local anesthesia  Flexible cystoscopy was then performed using a 16F scope  The distal urethra was noted to be significantly narrowed  Proximally 8-10 [employer] solo +wire advanced through the scope was used to dilate the stricture to be able to pass  Prostate was noted to be normal in course and caliber  Once inside the bladder, pan cystoscopy was performed  The urine was significantly cloudy  It was obtained and sent for culture  Right nephroureteral tube was visualized  There were some mild areas of irritation and inflammation of the bladder mucosa but no specific tumors or papillary lesions  After the cystoscope was performed, the scope was removed in a push-pull fashion leaving the wire in place  [id number] tip catheter advanced over the wire into the bladder  10 cc of sterile water placed in balloon  Catheter was hooked to gravity drainage

## 2022-11-30 NOTE — PROGRESS NOTES
UROLOGY PROCEDURE NOTE     CHIEF COMPLAINT   Alexa Aranda is a 74 y o  male with a complaint of   Chief Complaint   Patient presents with   • Cystoscopy       History of Present Illness:   Alexa Aranda is a 74 y o  male here for evaluation of bladder cancer  I personally met the patient in May of 2022  Patient has a history of alcoholic cirrhosis and prior liver transplantation 20 years ago  Patient reports being seen by a prior urologist in 2019 for some bladder issues  CT scan in May of 2021 did raise some question of bladder wall thickening  He did not see a urologist after this concern  Patient presented to the hospital in May with gross hematuria  CT scan raise some concern about bladder lesions around the right side and distal ureter  Resection of the bladder was performed and demonstrated muscle invasive bladder cancer  The right ureter could not be visualized or stented and so the patient required nephrostomy tube placement  Given the patient's significant comorbidities, discussion at multidisciplinary tumor board was held  Patient was offered chemotherapy and radiation  Unfortunately was unable to complete the course of chemotherapy given toxicity however radiation was completed  The patient has required nephrostomy tube changes however has had some dislodgement  In October of this year, his PCN was exchanged to a PCN you and the tube was capped  Over the subsequent month and a half the patient has not developed significant bleeding but has had flank pain and cloudy appearing urine  He returns today for discussion and cystoscopic evaluation of his radiated bladder        Past Medical History:     Past Medical History:   Diagnosis Date   • Alcoholism ([address] )    • Anemia    • Bladder cancer ([address] )    • Cerebral artery aneurysm    • Change in mental state     last assessed 5/18/15; resolved 10/27/15   • Diabetes mellitus ([address] )    • Drug dependence ([address] )    • Fatigue     last assessed 1/26/15; resolved 5/24/16   • Hepatitis C    • Hospital discharge follow-up 12/21/2018   • Hx of liver transplant [facility])    • Hydronephrosis of right kidney    • Kidney disease    • 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HCC)    • Liver cirrhosis ([facility] )    • Liver transplant recipient [facility])    • Liver transplant status ([facility] )    • Renal disorder    • Stroke (cerebrum) ([facility] )    • Stroke ([facility] )    • Subdural hygroma     2/27/14; resolved 7/28/15   • Thrombocytopenia ([facility] )    • Thrombocytopenia ([facility] ) 9/20/2017       PAST SURGICAL HISTORY:     Past Surgical History:   Procedure Laterality Date   • BRAIN SURGERY  02/12/2014    left frontotemporal 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   • CATARACT EXTRACTION     • CHOLECYSTECTOMY     • CYSTOSCOPY  11/30/2022    [place]   • FL LUMBAR PUNCTURE DIAGNOSTIC  12/14/2018   • HEPATITIS B SURFACE AB QN,LIVER TRANSPLANT (HISTORICAL)     • IR NEPHROSTOMY TUBE CHECK/CHANGE/REPOSITION/REINSERTION/UPSIZE  07/29/2022   • IR NEPHROSTOMY TUBE CHECK/CHANGE/REPOSITION/REINSERTION/UPSIZE  08/31/2022   • IR NEPHROSTOMY TUBE CHECK/CHANGE/REPOSITION/REINSERTION/UPSIZE  10/07/2022   • IR NEPHROSTOMY TUBE PLACEMENT  05/28/2022   • IR PICC LINE  12/14/2018   • IR PORT PLACEMENT  06/23/2022   • LIVER TRANSPLANTATION     • ROTATOR CUFF REPAIR     • SHOULDER SURGERY     • TRANSURETHRAL RESECTION OF BLADDER TUMOR N/A 05/26/2022    Procedure: TRANSURETHRAL RESECTION OF BLADDER TUMOR (TURBT);   Surgeon: Elana Ruggiero MD;  Location: [facility];  Service: Urology       CURRENT MEDICATIONS:     Current Outpatient Medications   Medication Sig Dispense Refill   • acetaminophen (TYLENOL) 325 mg tablet Take 3 tablets (975 mg total) by mouth every 8 (eight) hours  0   • Alcohol Swabs (B-D SINGLE USE SWABS REGULAR) PADS USE 2-3 TIMES DAILY AS NEEDED     • Blood Glucose Calibration (OT ULTRA/FASTTK CNTRL SOLN) SOLN USE AS DIRECTED 1 each 0   • clopidogrel (PLAVIX) 75 mg tablet TAKE 1 TABLET (75 MG TOTAL) BY MOUTH DAILY 90 tablet 3   • Comfort EZ Pen Needles 32G X 4 MM MISC USE 3-4 AS DIRECTED 100 each 5   • Continuous Blood Gluc Sensor (FreeStyle Frank 14 Day Sensor) MISC Use 1 Device 4 (four) times a day 1 each 5   • insulin detemir (Levemir FlexTouch) 100 Units/mL injection pen Inject 15 Units under the skin daily at bedtime 15 mL 3   • Lancet Devices (Lancing Device) MISC USE AS DIRECTED 1 each 0   • loperamide (IMODIUM) 2 mg capsule Take 1 capsule (2 mg total) by mouth 2 (two) times a day as needed for diarrhea [employer] carmen tableta dos veces por cynthia si tiene diarrea 90 capsule 0   • Nutritional Supplements (Glucerna Shake) LIQD Take 1 Bottle by mouth 3 (three) times a day 34276 mL 0   • OneTouch Ultra test strip TEST 3-4 TIMES A  each 4   • pregabalin (LYRICA) 75 mg capsule Take 1 capsule (75 mg total) by mouth 2 (two) times a day for 10 days 60 capsule 5   • rosuvastatin (CRESTOR) 40 MG tablet TAKE ONE (1) TABLET BY MOUTH DAILY 30 tablet 5   • sodium chloride 10 ML BY INTRACATHETER ROUTE DAILY INTRACATHETER FLUSHING DAILY   MAY SUBSTITUTE PREFILLED SYRINGE WITH NORMAL SALINE 10 ML VIALS, 10 ML SYRI     • tacrolimus (PROGRAF) 1 mg capsule TAKE 1 CAPSULE (1 MG TOTAL) BY MOUTH EVERY 12 (TWELVE) HOURS 180 capsule 3   • tamsulosin (FLOMAX) 0 4 mg Take 1 capsule (0 4 mg total) by mouth daily with dinner 90 capsule 3   • temazepam (RESTORIL) 7 5 mg capsule Take 2 capsules (15 mg total) by mouth daily at bedtime as needed for sleep 30 capsule 0   • zolpidem (AMBIEN) 5 mg tablet Take 1 tablet (5 mg total) by mouth daily at bedtime as needed for sleep for up to 10 days 30 tablet 0   • Continuous Blood Gluc  (FreeStyle Frank 14 Day [place]) NATALIA Use 1 Device continuous (Patient not taking: Reported on 10/17/2022) 1 each 0   • Incontinence Supply Disposable (Incontinence Brief Medium) MISC Use 4 (four) times a day 120 each 0     Current Facility-Administered Medications   Medication Dose Route Frequency Provider Last Rate Last Admin   • lidocaine (URO-JET, GLYDO) 2 % urethral/mucosal gel 1 application  1 application Urethral Daily PRN Ari Santos MD           ALLERGIES:     Allergies   Allergen Reactions   • Tequin [Gatifloxacin] Rash   • Lipitor [Atorvastatin] Other (See Comments)     Rash and itching   • Ciprofloxacin    • Ciprofloxacin Rash   • Iohexol Other (See Comments)     Unknown  • Iv Contrast [Iodinated Diagnostic Agents]    • Iv Contrast [Iodinated Diagnostic Agents] Other (See Comments)     Unknown   • Levofloxacin Rash   • Levofloxacin Other (See Comments)     Unknown  • Lipitor [Atorvastatin] Rash     Rash and itching   • Omnipaque [Iohexol]        SOCIAL HISTORY:     Social History     Socioeconomic History   • Marital status: Single     Spouse name: None   • Number of children: None   • Years of education: less then high school   • Highest education level: None   Occupational History   • Occupation: physical disability    Tobacco Use   • Smoking status: Former     Packs/day: 1 00     Types: Cigarettes   • Smokeless tobacco: Never   • Tobacco comments:     former smoker per allscripts    Vaping Use   • Vaping Use: Never used   Substance and Sexual Activity   • Alcohol use: Not Currently   • Drug use: No     Comment: remotely quit drug use per allscripts    • Sexual activity: Not Currently   Other Topics Concern   • None   Social History Narrative    ** Merged History Encounter **         ** Merged History Encounter **         Caffeine use   Living with significant other , girlfriend and daughter      Social Determinants of Health     Financial Resource Strain: Not on file   Food Insecurity: No Food Insecurity   • Worried About Running Out of Food in the Last Year: Never true   • Ran Out of Food in the Last Year: Never true   Transportation Needs: No Transportation Needs   • Lack of Transportation (Medical): No   • Lack of Transportation (Non-Medical):  No   Physical Activity: Not on file   Stress: Not on file   Social Connections: Not on file   Intimate Partner Violence: Not on file   Housing Stability: Low Risk    • Unable to Pay for Housing in the Last Year: No   • Number of Places Lived in the Last Year: 1   • Unstable Housing in the Last Year: No       SOCIAL HISTORY:     Family History   Problem Relation Age of Onset   • Hypertension Mother         benign essential    • Lung cancer Father    • Diabetes Sister    • Cancer Brother    • Stroke Other         cva - due to embolism of cerebra artery        REVIEW OF SYSTEMS:     Review of Systems   Constitutional: Positive for activity change and unexpected weight change  Respiratory: Negative  Cardiovascular: Negative  Gastrointestinal: Positive for abdominal pain  Genitourinary: Positive for difficulty urinating, dysuria and flank pain  Negative for hematuria  Musculoskeletal: Positive for back pain  Skin: Negative  Psychiatric/Behavioral: Negative  PHYSICAL EXAM:     /64 (BP Location: Left arm, Patient Position: Sitting, Cuff Size: Adult)   Pulse 64   Ht 5' 3" (1 6 m)   Wt 54 kg (119 lb)   BMI 21 08 kg/m²     Physical Exam  Constitutional:       Appearance: He is ill-appearing  Comments: Thin and frail-appearing elderly male   HENT:      Head: Normocephalic  Nose: Nose normal       Mouth/Throat:      Mouth: Mucous membranes are moist    Eyes:      Pupils: Pupils are equal, round, and reactive to light  Cardiovascular:      Rate and Rhythm: Normal rate  Pulses: Normal pulses  Pulmonary:      Effort: Pulmonary effort is normal    Abdominal:      General: Abdomen is flat  Genitourinary:     Comments: Uncircumcised phallus, reducible foreskin, orthotopic meatus, testicles smooth and descended, right flank with capped nephrostomy tube, area inspected without fluctuance or drainage of purulent material  Musculoskeletal:         General: Normal range of motion        Cervical back: Normal range of motion  Skin:     General: Skin is warm  Neurological:      Mental Status: He is alert  Psychiatric:         Mood and Affect: Mood normal          LABS:     CBC:   Lab Results   Component Value Date    WBC 7 79 11/29/2022    HGB 9 6 (L) 11/29/2022    HCT 31 4 (L) 11/29/2022    MCV 81 (L) 11/29/2022     (L) 11/29/2022       BMP:   Lab Results   Component Value Date    GLUCOSE 179 (H) 08/05/2022    CALCIUM 8 9 11/29/2022     12/17/2015    K 4 1 11/29/2022    CO2 23 11/29/2022     11/29/2022    BUN 52 (H) 11/29/2022    CREATININE 2 42 (H) 11/29/2022         IMAGING:     10/14/22  CT CHEST, ABDOMEN AND PELVIS WITHOUT IV CONTRAST     INDICATION:   69-year-old male with history of bladder cancer (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) status post transurethral resection of bladder tumor on 5/26/2022 and cirrhosis status post liver transplant in   2003         COMPARISON:  CT chest, abdomen, pelvis 10/8/2022      TECHNIQUE: CT examination of the chest, abdomen and pelvis was performed without intravenous contrast  Axial, sagittal, and coronal 2D reformatted images were created from the source data and submitted for interpretation        Radiation dose length product (DLP) for this visit:  518 18 mGy-cm   This examination, like all CT scans performed in the [facility], was performed utilizing techniques to minimize radiation dose exposure, including the use of iterative   reconstruction and automated exposure control       Enteric contrast was not administered       FINDINGS:     CHEST     LUNGS:  No tracheal or endobronchial lesion  Subsegmental atelectasis in the lingula and the left lower lobe      A 9 mm right upper lobe solid pulmonary nodule (3/21), unchanged in size since 10/8/2022, but new since 5/23/2021      A 3 mm right upper lobe calcified granuloma (3/36)    A 4 mm right middle lobe triangular shaped juxtapleural pulmonary nodule (3/51), stable since 5/23/2021, likely representing an intrapulmonary lymph node      PLEURA:  Unremarkable      HEART/GREAT VESSELS: The heart is not enlarged  No pericardial effusion  Mild coronary artery calcifications  No thoracic aortic aneurysm  Mild atherosclerotic calcifications of the thoracic aorta      MEDIASTINUM AND JOHN: Stable mildly enlarged precarinal lymph node measuring 1 2 cm in short axis (2/21) since 5/23/2021      CHEST WALL AND LOWER NECK: Right chest wall vascular access port with tip in the right atrium  Mild bilateral gynecomastia      ABDOMEN     LIVER/BILIARY TREE:  Postsurgical changes of liver transplantation  No suspicious hepatic mass identified      GALLBLADDER:  Gallbladder is surgically absent      SPLEEN:  Unremarkable      PANCREAS:  Unremarkable      ADRENAL GLANDS:  Unremarkable      KIDNEYS/URETERS: Persistent mild right hydronephrosis with a right percutaneous nephroureteral stent in appropriate position, unchanged in position since prior study  Stable mural thickening of the mid to distal right ureter (2/86) with surrounding   periureteral stranding since CT 6/9/2022      STOMACH AND BOWEL:  Unremarkable      APPENDIX:  No findings to suggest appendicitis      ABDOMINOPELVIC CAVITY:  No ascites  No pneumoperitoneum  No lymphadenopathy      VESSELS:  Atherosclerotic changes are present  No evidence of aneurysm      PELVIS     REPRODUCTIVE ORGANS:  The prostate is nonenlarged  Intraprostatic calcifications      URINARY BLADDER:  Underdistended, limiting its evaluation      ABDOMINAL WALL/INGUINAL REGIONS:  Small bilateral fat-containing inguinal hernias      OSSEOUS STRUCTURES:  No acute fracture or destructive osseous lesion  Degenerative changes of the visualized spine  Unchanged inferior mild wedge compression deformity of the L2 vertebral body  Degenerative changes of the bilateral hips    Deformities   of the right ribs, consistent with healed right rib fractures      IMPRESSION:     1  A 9 mm right upper lobe solid pulmonary nodule, unchanged in size since 10/8/2022, but new since 5/23/2021, this finding is again suspicious for a pulmonary metastasis      2   Persistent mild right hydronephrosis with a right percutaneous nephroureteral stent in appropriate position      3   Stable mural thickening of the mid to distal right ureter with surrounding periureteral stranding since CT 6/9/2022  PATHOLOGY:     5/26/22  Final Diagnosis   A  Urinary Bladder, Right posterior bladder tumor, Transurethral resection:  - Invasive high-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 (detrusor muscle)      B  Urinary Bladder, Right bladder tumor deep, Transurethral resection:  - Invasive high-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 (detrusor muscle)  Electronically      PROCEDURE:     SEE NOTE    ASSESSMENT:     74 y o  male  with muscle invasive bladder cancer status post aborted chemotherapy and completed radiation, right ureteral obstruction from tumor with capped right nephroureteral tube and new evidence of distal urethral stricture    PLAN:     Patient's urethra was dilated to perform cystoscopy today  Catheter was left in place and can be removed later this week  I discussed with the patient's family that this stricture is likely to recur over time it may be related to prior interventions, cancer or radiation  Bladder is somewhat cloudy  We did send urine for culture  Patient will be empirically treated with Ceftin daily given his renal dysfunction  Now that the tube is converted from nephrostomy to nephroureteral catheter, I am willing to consider a trial of internalization  In January, the patient can be scheduled for cystoscopy with retrograde pyelogram and possible right ureteroscopy with removal of nephrostomy tube and placement of right ureteral stent    Family understands this will need to be exchanged every 3-6 months for the rest of the patient's life  At the time we can consider biopsies of any abnormal appearing bladder lesions  Patient will need medical clearance prior to this surgery given his general frailty

## 2022-11-30 NOTE — TELEPHONE ENCOUNTER
Appointment Confirmation   Person confirmed appointment with  If not patient, name of the person YOEL/ [address] 64    Date and time of appointment 12/01   Patient acknowledged and will be at appointment?  yes   Did you advise the patient that they will need a urine sample if they are a new patient? no   Did you advise the patient to bring their current medications for verification? (including any OTC) yes   Additional Information

## 2022-12-01 ENCOUNTER — TELEPHONE (OUTPATIENT)
Dept: NEPHROLOGY | Facility: CLINIC | Age: 72
End: 2022-12-01

## 2022-12-01 ENCOUNTER — TELEPHONE (OUTPATIENT)
Dept: OTHER | Facility: OTHER | Age: 72
End: 2022-12-01

## 2022-12-01 ENCOUNTER — HOSPITAL ENCOUNTER (INPATIENT)
Facility: HOSPITAL | Age: 72
LOS: 2 days | Discharge: HOME HEALTH | End: 2022-12-04
Attending: EMERGENCY MEDICINE | Admitting: INTERNAL MEDICINE

## 2022-12-01 DIAGNOSIS — N18.4 CHRONIC KIDNEY DISEASE (CKD), STAGE IV (SEVERE) (HCC): ICD-10-CM

## 2022-12-01 DIAGNOSIS — R53.1 GENERALIZED WEAKNESS: Primary | ICD-10-CM

## 2022-12-01 DIAGNOSIS — G89.18 POST-OPERATIVE PAIN: ICD-10-CM

## 2022-12-01 PROBLEM — R39.89 SUSPECTED UTI: Status: ACTIVE | Noted: 2022-01-01

## 2022-12-01 PROBLEM — R39.89 SUSPECTED UTI: Status: ACTIVE | Noted: 2022-12-01

## 2022-12-01 LAB
ALBUMIN SERPL BCP-MCNC: 2.9 G/DL (ref 3.5–5)
ALP SERPL-CCNC: 75 U/L (ref 46–116)
ALT SERPL W P-5'-P-CCNC: 22 U/L (ref 12–78)
ANION GAP SERPL CALCULATED.3IONS-SCNC: 6 MMOL/L (ref 4–13)
APTT PPP: 28 SECONDS (ref 23–37)
AST SERPL W P-5'-P-CCNC: 27 U/L (ref 5–45)
BASOPHILS # BLD AUTO: 0.04 THOUSANDS/ÂΜL (ref 0–0.1)
BASOPHILS NFR BLD AUTO: 1 % (ref 0–1)
BILIRUB SERPL-MCNC: 0.35 MG/DL (ref 0.2–1)
BUN SERPL-MCNC: 57 MG/DL (ref 5–25)
CALCIUM ALBUM COR SERPL-MCNC: 10.1 MG/DL (ref 8.3–10.1)
CALCIUM SERPL-MCNC: 9.2 MG/DL (ref 8.3–10.1)
CHLORIDE SERPL-SCNC: 111 MMOL/L (ref 96–108)
CO2 SERPL-SCNC: 20 MMOL/L (ref 21–32)
CREAT SERPL-MCNC: 2.81 MG/DL (ref 0.6–1.3)
EOSINOPHIL # BLD AUTO: 0.2 THOUSAND/ÂΜL (ref 0–0.61)
EOSINOPHIL NFR BLD AUTO: 2 % (ref 0–6)
ERYTHROCYTE [DISTWIDTH] IN BLOOD BY AUTOMATED COUNT: 15.7 % (ref 11.6–15.1)
GFR SERPL CREATININE-BSD FRML MDRD: 21 ML/MIN/1.73SQ M
GLUCOSE SERPL-MCNC: 119 MG/DL (ref 65–140)
GLUCOSE SERPL-MCNC: 174 MG/DL (ref 65–140)
GLUCOSE SERPL-MCNC: 203 MG/DL (ref 65–140)
GLUCOSE SERPL-MCNC: 210 MG/DL (ref 65–140)
HCT VFR BLD AUTO: 33.8 % (ref 36.5–49.3)
HGB BLD-MCNC: 10.4 G/DL (ref 12–17)
IMM GRANULOCYTES # BLD AUTO: 0.04 THOUSAND/UL (ref 0–0.2)
IMM GRANULOCYTES NFR BLD AUTO: 1 % (ref 0–2)
INR PPP: 1.1 (ref 0.84–1.19)
LYMPHOCYTES # BLD AUTO: 0.75 THOUSANDS/ÂΜL (ref 0.6–4.47)
LYMPHOCYTES NFR BLD AUTO: 9 % (ref 14–44)
MCH RBC QN AUTO: 24.3 PG (ref 26.8–34.3)
MCHC RBC AUTO-ENTMCNC: 30.8 G/DL (ref 31.4–37.4)
MCV RBC AUTO: 79 FL (ref 82–98)
MONOCYTES # BLD AUTO: 0.84 THOUSAND/ÂΜL (ref 0.17–1.22)
MONOCYTES NFR BLD AUTO: 10 % (ref 4–12)
NEUTROPHILS # BLD AUTO: 6.38 THOUSANDS/ÂΜL (ref 1.85–7.62)
NEUTS SEG NFR BLD AUTO: 77 % (ref 43–75)
NRBC BLD AUTO-RTO: 0 /100 WBCS
PLATELET # BLD AUTO: 113 THOUSANDS/UL (ref 149–390)
PMV BLD AUTO: 13.1 FL (ref 8.9–12.7)
POTASSIUM SERPL-SCNC: 4.9 MMOL/L (ref 3.5–5.3)
PROT SERPL-MCNC: 8.4 G/DL (ref 6.4–8.4)
PROTHROMBIN TIME: 14.4 SECONDS (ref 11.6–14.5)
RBC # BLD AUTO: 4.28 MILLION/UL (ref 3.88–5.62)
SODIUM SERPL-SCNC: 137 MMOL/L (ref 135–147)
WBC # BLD AUTO: 8.25 THOUSAND/UL (ref 4.31–10.16)

## 2022-12-01 RX ORDER — MAGNESIUM HYDROXIDE/ALUMINUM HYDROXICE/SIMETHICONE 120; 1200; 1200 MG/30ML; MG/30ML; MG/30ML
30 SUSPENSION ORAL EVERY 6 HOURS PRN
Status: DISCONTINUED | OUTPATIENT
Start: 2022-12-01 | End: 2022-12-04 | Stop reason: HOSPADM

## 2022-12-01 RX ORDER — SIMETHICONE 80 MG
80 TABLET,CHEWABLE ORAL 4 TIMES DAILY PRN
Status: DISCONTINUED | OUTPATIENT
Start: 2022-12-01 | End: 2022-12-04 | Stop reason: HOSPADM

## 2022-12-01 RX ORDER — ACETAMINOPHEN 325 MG/1
650 TABLET ORAL EVERY 4 HOURS PRN
Status: DISCONTINUED | OUTPATIENT
Start: 2022-12-01 | End: 2022-12-04 | Stop reason: HOSPADM

## 2022-12-01 RX ORDER — OXYCODONE HYDROCHLORIDE 5 MG/1
2.5 TABLET ORAL EVERY 6 HOURS PRN
Status: DISCONTINUED | OUTPATIENT
Start: 2022-12-01 | End: 2022-12-04 | Stop reason: HOSPADM

## 2022-12-01 RX ORDER — INSULIN LISPRO 100 [IU]/ML
1-5 INJECTION, SOLUTION INTRAVENOUS; SUBCUTANEOUS
Status: DISCONTINUED | OUTPATIENT
Start: 2022-12-01 | End: 2022-12-04 | Stop reason: HOSPADM

## 2022-12-01 RX ORDER — LIDOCAINE HYDROCHLORIDE 20 MG/ML
1 JELLY TOPICAL DAILY PRN
Status: DISCONTINUED | OUTPATIENT
Start: 2022-12-01 | End: 2022-12-04 | Stop reason: HOSPADM

## 2022-12-01 RX ORDER — FENTANYL CITRATE 50 UG/ML
50 INJECTION, SOLUTION INTRAMUSCULAR; INTRAVENOUS ONCE
Status: COMPLETED | OUTPATIENT
Start: 2022-12-01 | End: 2022-12-01

## 2022-12-01 RX ORDER — OXYBUTYNIN CHLORIDE 5 MG/1
5 TABLET ORAL ONCE
Status: COMPLETED | OUTPATIENT
Start: 2022-12-01 | End: 2022-12-01

## 2022-12-01 RX ORDER — OXYBUTYNIN CHLORIDE 5 MG/1
5 TABLET ORAL 2 TIMES DAILY PRN
Status: DISCONTINUED | OUTPATIENT
Start: 2022-12-01 | End: 2022-12-04 | Stop reason: HOSPADM

## 2022-12-01 RX ORDER — CLOPIDOGREL BISULFATE 75 MG/1
75 TABLET ORAL DAILY
Status: DISCONTINUED | OUTPATIENT
Start: 2022-12-01 | End: 2022-12-04 | Stop reason: HOSPADM

## 2022-12-01 RX ORDER — TACROLIMUS 1 MG/1
1 CAPSULE ORAL 2 TIMES DAILY
Status: DISCONTINUED | OUTPATIENT
Start: 2022-12-01 | End: 2022-12-04 | Stop reason: HOSPADM

## 2022-12-01 RX ORDER — TEMAZEPAM 15 MG/1
15 CAPSULE ORAL
Status: DISCONTINUED | OUTPATIENT
Start: 2022-12-01 | End: 2022-12-04 | Stop reason: HOSPADM

## 2022-12-01 RX ORDER — HEPARIN SODIUM 5000 [USP'U]/ML
5000 INJECTION, SOLUTION INTRAVENOUS; SUBCUTANEOUS EVERY 8 HOURS SCHEDULED
Status: DISCONTINUED | OUTPATIENT
Start: 2022-12-01 | End: 2022-12-04 | Stop reason: HOSPADM

## 2022-12-01 RX ORDER — ONDANSETRON 2 MG/ML
4 INJECTION INTRAMUSCULAR; INTRAVENOUS EVERY 6 HOURS PRN
Status: DISCONTINUED | OUTPATIENT
Start: 2022-12-01 | End: 2022-12-04 | Stop reason: HOSPADM

## 2022-12-01 RX ORDER — LIDOCAINE HYDROCHLORIDE 20 MG/ML
1 JELLY TOPICAL ONCE
Status: COMPLETED | OUTPATIENT
Start: 2022-12-01 | End: 2022-12-01

## 2022-12-01 RX ORDER — TAMSULOSIN HYDROCHLORIDE 0.4 MG/1
0.4 CAPSULE ORAL
Status: DISCONTINUED | OUTPATIENT
Start: 2022-12-01 | End: 2022-12-04 | Stop reason: HOSPADM

## 2022-12-01 RX ORDER — HYDROMORPHONE HCL IN WATER/PF 6 MG/30 ML
0.2 PATIENT CONTROLLED ANALGESIA SYRINGE INTRAVENOUS EVERY 4 HOURS PRN
Status: DISCONTINUED | OUTPATIENT
Start: 2022-12-01 | End: 2022-12-04 | Stop reason: HOSPADM

## 2022-12-01 RX ORDER — PREGABALIN 75 MG/1
75 CAPSULE ORAL 2 TIMES DAILY
Status: DISCONTINUED | OUTPATIENT
Start: 2022-12-01 | End: 2022-12-04 | Stop reason: HOSPADM

## 2022-12-01 RX ADMIN — PREGABALIN 75 MG: 75 CAPSULE ORAL at 13:28

## 2022-12-01 RX ADMIN — OXYBUTYNIN CHLORIDE 5 MG: 5 TABLET ORAL at 22:38

## 2022-12-01 RX ADMIN — FENTANYL CITRATE 50 MCG: 50 INJECTION INTRAMUSCULAR; INTRAVENOUS at 07:41

## 2022-12-01 RX ADMIN — OXYBUTYNIN CHLORIDE 5 MG: 5 TABLET ORAL at 12:21

## 2022-12-01 RX ADMIN — INSULIN LISPRO 1 UNITS: 100 INJECTION, SOLUTION INTRAVENOUS; SUBCUTANEOUS at 13:29

## 2022-12-01 RX ADMIN — OXYCODONE HYDROCHLORIDE 2.5 MG: 5 TABLET ORAL at 22:37

## 2022-12-01 RX ADMIN — HYDROMORPHONE HYDROCHLORIDE 0.2 MG: 0.2 INJECTION, SOLUTION INTRAMUSCULAR; INTRAVENOUS; SUBCUTANEOUS at 17:49

## 2022-12-01 RX ADMIN — INSULIN LISPRO 1 UNITS: 100 INJECTION, SOLUTION INTRAVENOUS; SUBCUTANEOUS at 22:39

## 2022-12-01 RX ADMIN — CLOPIDOGREL BISULFATE 75 MG: 75 TABLET ORAL at 13:28

## 2022-12-01 RX ADMIN — TAMSULOSIN HYDROCHLORIDE 0.4 MG: 0.4 CAPSULE ORAL at 17:59

## 2022-12-01 RX ADMIN — TEMAZEPAM 15 MG: 15 CAPSULE ORAL at 22:47

## 2022-12-01 RX ADMIN — OXYCODONE HYDROCHLORIDE 2.5 MG: 5 TABLET ORAL at 14:51

## 2022-12-01 RX ADMIN — HEPARIN SODIUM 5000 UNITS: 5000 INJECTION INTRAVENOUS; SUBCUTANEOUS at 22:37

## 2022-12-01 RX ADMIN — TACROLIMUS 1 MG: 1 CAPSULE ORAL at 22:44

## 2022-12-01 RX ADMIN — PREGABALIN 75 MG: 75 CAPSULE ORAL at 17:58

## 2022-12-01 RX ADMIN — FENTANYL CITRATE 50 MCG: 50 INJECTION INTRAMUSCULAR; INTRAVENOUS at 10:51

## 2022-12-01 RX ADMIN — LIDOCAINE HYDROCHLORIDE 1 APPLICATION: 20 JELLY TOPICAL at 10:52

## 2022-12-01 RX ADMIN — CEFTRIAXONE SODIUM 1000 MG: 10 INJECTION, POWDER, FOR SOLUTION INTRAVENOUS at 13:29

## 2022-12-01 RX ADMIN — HEPARIN SODIUM 5000 UNITS: 5000 INJECTION INTRAVENOUS; SUBCUTANEOUS at 13:29

## 2022-12-01 RX ADMIN — TACROLIMUS 1 MG: 1 CAPSULE ORAL at 13:28

## 2022-12-01 RX ADMIN — INSULIN DETEMIR 12 UNITS: 100 INJECTION, SOLUTION SUBCUTANEOUS at 22:44

## 2022-12-01 NOTE — TELEPHONE ENCOUNTER
rec'd phone call from [facility] that patient is currently being admitted to the hospital and will not be able to come to his 4pm appt today with Dr You Burciaga  Advised I will cancel the appt and for them to call when he's home to r/s

## 2022-12-01 NOTE — ASSESSMENT & PLAN NOTE
· Patient had right-sided percutaneous nephrostomy tube  · Currently capped-urology evaluation appreciated

## 2022-12-01 NOTE — ASSESSMENT & PLAN NOTE
· Patient had cystoscopy with dilatation of the urethra and Webb catheter placement yesterday  Noted to have cloudy urine and started on antibiotics  · Patient was unable to take the antibiotics at home due to pain  · IV ceftriaxone  · Follow-up on the cultures and transition to p o   Antibiotics  · Follow-up on the urine culture and blood culture

## 2022-12-01 NOTE — TELEPHONE ENCOUNTER
Patient's wife called stating patient is currently admitted into the hospital  She is unsure whether or not he will be discharged today, so she isn't sure if he will be able to make it for tomorrow's procedure  Please call wife back to discuss

## 2022-12-01 NOTE — ED PROVIDER NOTES
History  Chief Complaint   Patient presents with   • Weakness - Generalized     Per EMS pt recently diagnosed with "infection," pt not taking meds prescribed to him  Pt feeling weak and not eating  27-year-old male presents emergency department with complaints penile pain  States that he had surgery yesterday and Webb placed  In placing of pain at the tip of his penis well as drainage around the   Denies fevers at home  No abdominal discomfort  Denies blood in Webb  States that he has not yet started his postprocedure antibiotics  Additionally patient presents with a leg bag which is currently full  History provided by:  Patient   used: No        Prior to Admission Medications   Prescriptions Last Dose Informant Patient Reported? Taking?    Alcohol Swabs (B-D SINGLE USE SWABS REGULAR) PADS   Yes No   Sig: USE 2-3 TIMES DAILY AS NEEDED   Blood Glucose Calibration (OT ULTRA/FASTTK CNTRL SOLN) SOLN   No No   Sig: USE AS DIRECTED   Comfort EZ Pen Needles 32G X 4 MM MISC   No No   Sig: USE 3-4 AS DIRECTED   Continuous Blood Gluc  (FreeStyle Frank 14 Day [place]) NATALIA   No No   Sig: Use 1 Device continuous   Patient not taking: Reported on 10/17/2022   Continuous Blood Gluc Sensor (FreeStyle Frank 14 Day Sensor) [facility]   No No   Sig: Use 1 Device 4 (four) times a day   Incontinence Supply Disposable (Incontinence Brief Medium) MISC   No No   Sig: Use 4 (four) times a day   Lancet Devices (Lancing Device) MISC   No No   Sig: USE AS DIRECTED   Nutritional Supplements (Glucerna Shake) LIQD   No No   Sig: Take 1 Bottle by mouth 3 (three) times a day   OneTouch Ultra test strip   No No   Sig: TEST 3-4 TIMES A DAY   acetaminophen (TYLENOL) 325 mg tablet   No No   Sig: Take 3 tablets (975 mg total) by mouth every 8 (eight) hours   cefuroxime (CEFTIN) 250 mg tablet   No No   Sig: Take 1 tablet (250 mg total) by mouth every 24 hours for 5 days   clopidogrel (PLAVIX) 75 mg tablet   No No Sig: TAKE 1 TABLET (75 MG TOTAL) BY MOUTH DAILY   insulin detemir (Levemir FlexTouch) 100 Units/mL injection pen   No No   Sig: Inject 15 Units under the skin daily at bedtime   loperamide (IMODIUM) 2 mg capsule   No No   Sig: Take 1 capsule (2 mg total) by mouth 2 (two) times a day as needed for diarrhea Bajandas carmen tableta dos veces por cynthia si tiene diarrea   pregabalin (LYRICA) 75 mg capsule   No No   Sig: Take 1 capsule (75 mg total) by mouth 2 (two) times a day for 10 days   rosuvastatin (CRESTOR) 40 MG tablet   No No   Sig: TAKE ONE (1) TABLET BY MOUTH DAILY   sodium chloride   Yes No   Sig: 10 ML BY INTRACATHETER ROUTE DAILY INTRACATHETER FLUSHING DAILY   MAY SUBSTITUTE PREFILLED SYRINGE WITH NORMAL SALINE 10 ML VIALS, 10 ML SYRI   tacrolimus (PROGRAF) 1 mg capsule   No No   Sig: TAKE 1 CAPSULE (1 MG TOTAL) BY MOUTH EVERY 12 (TWELVE) HOURS   tamsulosin (FLOMAX) 0 4 mg   No No   Sig: Take 1 capsule (0 4 mg total) by mouth daily with dinner   temazepam (RESTORIL) 7 5 mg capsule   No No   Sig: Take 2 capsules (15 mg total) by mouth daily at bedtime as needed for sleep   zolpidem (AMBIEN) 5 mg tablet   No No   Sig: Take 1 tablet (5 mg total) by mouth daily at bedtime as needed for sleep for up to 10 days      Facility-Administered Medications Last Administration Doses Remaining   lidocaine (URO-JET, GLYDO) 2 % urethral/mucosal gel 1 application None recorded 15          Past Medical History:   Diagnosis Date   • Alcoholism ([facility] )    • Anemia    • Bladder cancer ([facility] )    • Cerebral artery aneurysm    • Change in mental state     last assessed 5/18/15; resolved 10/27/15   • Diabetes mellitus ([facility] )    • Drug dependence ([facility] )    • Fatigue     last assessed 1/26/15; resolved 5/24/16   • Hepatitis C    • Hospital discharge follow-up 12/21/2018   • Hx of liver transplant [facility])    • Hydronephrosis of right kidney    • Kidney disease    • 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[facility] )    • Liver cirrhosis ([facility] )    • Liver transplant recipient [facility])    • Liver transplant status ([facility] )    • Renal disorder    • Stroke (cerebrum) ([facility] )    • Stroke [facility])    • Subdural hygroma     2/27/14; resolved 7/28/15   • Thrombocytopenia ([facility] )    • Thrombocytopenia ([facility] ) 9/20/2017       Past Surgical History:   Procedure Laterality Date   • BRAIN SURGERY  02/12/2014    left frontotemporal 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   • CATARACT EXTRACTION     • CHOLECYSTECTOMY     • CYSTOSCOPY  11/30/2022    [place]   • FL LUMBAR PUNCTURE DIAGNOSTIC  12/14/2018   • HEPATITIS B SURFACE AB QN,LIVER TRANSPLANT (HISTORICAL)     • IR NEPHROSTOMY TUBE CHECK/CHANGE/REPOSITION/REINSERTION/UPSIZE  07/29/2022   • IR NEPHROSTOMY TUBE CHECK/CHANGE/REPOSITION/REINSERTION/UPSIZE  08/31/2022   • IR NEPHROSTOMY TUBE CHECK/CHANGE/REPOSITION/REINSERTION/UPSIZE  10/07/2022   • IR NEPHROSTOMY TUBE PLACEMENT  05/28/2022   • IR PICC LINE  12/14/2018   • IR PORT PLACEMENT  06/23/2022   • LIVER TRANSPLANTATION     • ROTATOR CUFF REPAIR     • SHOULDER SURGERY     • TRANSURETHRAL RESECTION OF BLADDER TUMOR N/A 05/26/2022    Procedure: TRANSURETHRAL RESECTION OF BLADDER TUMOR (TURBT); Surgeon: Elham Champion MD;  Location: [facility];  Service: Urology       Family History   Problem Relation Age of Onset   • Hypertension Mother         benign essential    • Lung cancer Father    • Diabetes Sister    • Cancer Brother    • Stroke Other         cva - due to embolism of cerebra artery      I have reviewed and agree with the history as documented      E-Cigarette/Vaping   • E-Cigarette Use Never User      E-Cigarette/Vaping Substances   • Nicotine No    • THC No    • CBD No    • Flavoring No    • Other No    • Unknown No      Social History     Tobacco Use   • Smoking status: Former     Packs/day: 1 00     Types: Cigarettes   • Smokeless tobacco: Never   • Tobacco comments:     former smoker per allscripts    Vaping Use   • Vaping Use: Never used Substance Use Topics   • Alcohol use: Not Currently   • Drug use: No     Comment: remotely quit drug use per allscripts        Review of Systems   Constitutional: Negative for activity change, appetite change, chills and fever  HENT: Negative for congestion, dental problem, drooling, ear discharge, ear pain, mouth sores, nosebleeds, rhinorrhea, sore throat and trouble swallowing  Eyes: Negative for pain, discharge and itching  Respiratory: Negative for cough, chest tightness, shortness of breath and wheezing  Cardiovascular: Negative for chest pain and palpitations  Gastrointestinal: Negative for abdominal pain, blood in stool, constipation, diarrhea, nausea and vomiting  Endocrine: Negative for cold intolerance and heat intolerance  Genitourinary: Positive for penile pain  Negative for difficulty urinating, dysuria, flank pain, frequency and urgency  Skin: Negative for rash and wound  Allergic/Immunologic: Negative for food allergies and immunocompromised state  Neurological: Negative for dizziness, seizures, syncope, weakness, numbness and headaches  Psychiatric/Behavioral: Negative for agitation, behavioral problems and confusion  Physical Exam  Physical Exam  Vitals and nursing note reviewed  Constitutional:       General: He is not in acute distress  Appearance: He is underweight and well-nourished  He is not diaphoretic  HENT:      Head: Normocephalic and atraumatic  Right Ear: External ear normal       Left Ear: External ear normal       Mouth/Throat:      Mouth: Oropharynx is clear and moist  Mucous membranes are moist    Eyes:      Conjunctiva/sclera: Conjunctivae normal    Neck:      Vascular: No JVD  Trachea: No tracheal deviation  Cardiovascular:      Rate and Rhythm: Normal rate and regular rhythm  Heart sounds: Normal heart sounds  No murmur heard  No friction rub  No gallop     Pulmonary:      Effort: Pulmonary effort is normal  No respiratory distress  Breath sounds: Normal breath sounds  No wheezing or rales  Chest:      Chest wall: No tenderness  Abdominal:      General: Bowel sounds are normal  There is no distension  Palpations: Abdomen is soft  Tenderness: There is no abdominal tenderness  There is no guarding  Genitourinary:     Penis: Normal and uncircumcised  Comments: Webb in place with pain at the insertion point  No surrounding erythema  No bleeding  Patient currently wearing a leg bag for drainage which is full  Musculoskeletal:         General: No tenderness, deformity or edema  Normal range of motion  Lymphadenopathy:      Cervical: No cervical adenopathy  Skin:     General: Skin is warm and dry  Findings: No erythema or rash  Neurological:      Mental Status: He is alert and oriented to person, place, and time     Psychiatric:         Mood and Affect: Mood and affect and mood normal          Behavior: Behavior normal          Vital Signs  ED Triage Vitals   Temperature Pulse Respirations Blood Pressure SpO2   12/01/22 0704 12/01/22 0704 12/01/22 0704 12/01/22 0704 12/01/22 0704   (!) 97 2 °F (36 2 °C) 66 18 121/66 96 %      Temp Source Heart Rate Source Patient Position - Orthostatic VS BP Location FiO2 (%)   12/01/22 0704 12/01/22 0845 12/01/22 0845 12/01/22 0704 --   Tympanic Monitor Sitting Right arm       Pain Score       12/01/22 0704       10 - Worst Possible Pain           Vitals:    12/01/22 0704 12/01/22 0845 12/01/22 1045 12/01/22 1145   BP: 121/66 121/66 125/73 99/55   Pulse: 66 84 84 80   Patient Position - Orthostatic VS:  Sitting Lying Lying         Visual Acuity      ED Medications  Medications   oxybutynin (DITROPAN) tablet 5 mg (has no administration in time range)   lidocaine (URO-JET) 2 % urethral/mucosal gel 1 application (has no administration in time range)   acetaminophen (TYLENOL) tablet 650 mg (has no administration in time range)   clopidogrel (PLAVIX) tablet 75 mg (75 mg Oral Given 12/1/22 1328)   insulin detemir (LEVEMIR) subcutaneous injection 12 Units (has no administration in time range)   pregabalin (LYRICA) capsule 75 mg (75 mg Oral Given 12/1/22 1328)   tacrolimus (PROGRAF) capsule 1 mg (1 mg Oral Given 12/1/22 1328)   tamsulosin (FLOMAX) capsule 0 4 mg (has no administration in time range)   temazepam (RESTORIL) capsule 15 mg (has no administration in time range)   insulin lispro (HumaLOG) 100 units/mL subcutaneous injection 1-5 Units (1 Units Subcutaneous Given 12/1/22 1329)   insulin lispro (HumaLOG) 100 units/mL subcutaneous injection 1-5 Units (has no administration in time range)   ondansetron (ZOFRAN) injection 4 mg (has no administration in time range)   aluminum-magnesium hydroxide-simethicone (MYLANTA) oral suspension 30 mL (has no administration in time range)   simethicone (MYLICON) chewable tablet 80 mg (has no administration in time range)   heparin (porcine) subcutaneous injection 5,000 Units (5,000 Units Subcutaneous Given 12/1/22 1329)   ceftriaxone (ROCEPHIN) 1 g/50 mL in dextrose IVPB (1,000 mg Intravenous New Bag 12/1/22 1329)   oxyCODONE (ROXICODONE) IR tablet 2 5 mg (has no administration in time range)   HYDROmorphone HCl (DILAUDID) injection 0 2 mg (has no administration in time range)   fentanyl citrate (PF) 100 MCG/2ML 50 mcg (50 mcg Intravenous Given 12/1/22 0741)   fentanyl citrate (PF) 100 MCG/2ML 50 mcg (50 mcg Intravenous Given 12/1/22 1051)   oxybutynin (DITROPAN) tablet 5 mg (5 mg Oral Given 12/1/22 1221)   lidocaine (URO-JET) 2 % urethral/mucosal gel 1 application (1 application Urethral Given 12/1/22 1052)       Diagnostic Studies  Results Reviewed     Procedure Component Value Units Date/Time    Fingerstick Glucose (POCT) [[id number]]  (Abnormal) Collected: 12/01/22 1317    Lab Status: Final result Updated: 12/01/22 1320     POC Glucose 210 mg/dl     Blood culture #1 [[id number]] Collected: 12/01/22 1225    Lab Status:  In process Specimen: Blood from Arm, Left Updated: 12/01/22 1308    Platelet count [[id number]]     Lab Status: No result Specimen: Blood     Blood culture #2 [[id number]] Collected: 12/01/22 1225    Lab Status:  In process Specimen: Blood from Arm, Right Updated: 12/01/22 1231    CBC and differential [[id number]]  (Abnormal) Collected: 12/01/22 0737    Lab Status: Final result Specimen: Blood from Arm, Right Updated: 12/01/22 0902     WBC 8 25 Thousand/uL      RBC 4 28 Million/uL      Hemoglobin 10 4 g/dL      Hematocrit 33 8 %      MCV 79 fL      MCH 24 3 pg      MCHC 30 8 g/dL      RDW 15 7 %      MPV 13 1 fL      Platelets 480 Thousands/uL      nRBC 0 /100 WBCs      Neutrophils Relative 77 %      Immat GRANS % 1 %      Lymphocytes Relative 9 %      Monocytes Relative 10 %      Eosinophils Relative 2 %      Basophils Relative 1 %      Neutrophils Absolute 6 38 Thousands/µL      Immature Grans Absolute 0 04 Thousand/uL      Lymphocytes Absolute 0 75 Thousands/µL      Monocytes Absolute 0 84 Thousand/µL      Eosinophils Absolute 0 20 Thousand/µL      Basophils Absolute 0 04 Thousands/µL     Comprehensive metabolic panel [[id number]]  (Abnormal) Collected: 12/01/22 0737    Lab Status: Final result Specimen: Blood from Arm, Right Updated: 12/01/22 0810     Sodium 137 mmol/L      Potassium 4 9 mmol/L      Chloride 111 mmol/L      CO2 20 mmol/L      ANION GAP 6 mmol/L      BUN 57 mg/dL      Creatinine 2 81 mg/dL      Glucose 119 mg/dL      Calcium 9 2 mg/dL      Corrected Calcium 10 1 mg/dL      AST 27 U/L      ALT 22 U/L      Alkaline Phosphatase 75 U/L      Total Protein 8 4 g/dL      Albumin 2 9 g/dL      Total Bilirubin 0 35 mg/dL      eGFR 21 ml/min/1 73sq m     Narrative:      [place] guidelines for Chronic Kidney Disease (CKD):   •  Stage 1 with normal or high GFR (GFR > 90 mL/min/1 73 square meters)  •  Stage 2 Mild CKD (GFR = 60-89 mL/min/1 73 square meters)  •  Stage 3A Moderate CKD (GFR = 45-59 mL/min/1 73 square meters)  •  Stage 3B Moderate CKD (GFR = 30-44 mL/min/1 73 square meters)  •  Stage 4 Severe CKD (GFR = 15-29 mL/min/1 73 square meters)  •  Stage 5 End Stage CKD (GFR <15 mL/min/1 73 square meters)  Note: GFR calculation is accurate only with a steady state creatinine    Protime-INR [[id number]]  (Normal) Collected: 12/01/22 0737    Lab Status: Final result Specimen: Blood from Arm, Right Updated: 12/01/22 0805     Protime 14 4 seconds      INR 1 10    APTT [[id number]]  (Normal) Collected: 12/01/22 0737    Lab Status: Final result Specimen: Blood from Arm, Right Updated: 12/01/22 0805     PTT 28 seconds                  No orders to display              Procedures  Procedures         ED Course  ED Course as of 12/01/22 1339   u Dec 01, 2022   0911 Message sent to urology regarding case  Will be done to evaluate  MDM  Number of Diagnoses or Management Options  Generalized weakness  Post-operative pain  Diagnosis management comments: Differential diagnosis includes but not limited to: urinary retention, CARLOTTA, UTI, failure to thrive        Amount and/or Complexity of Data Reviewed  Clinical lab tests: ordered and reviewed  Review and summarize past medical records: yes  Discuss the patient with other providers: yes        Disposition  Final diagnoses:   Generalized weakness   Post-operative pain     Time reflects when diagnosis was documented in both MDM as applicable and the Disposition within this note     Time User Action Codes Description Comment    12/1/2022 11:17 AM Edmundo Do Add [R53 1] Generalized weakness     12/1/2022 11:17 AM Edmundo Do Add [G89 18] Post-operative pain       ED Disposition     ED Disposition   Admit    Condition   Stable    Date/Time   u Dec 1, 2022 11:17 AM    Comment   Case was discussed with NILTON and the patient's admission status was agreed to be Admission Status: observation status to the service of Dr Fabiola Martinez   Follow-up Information    None         Patient's Medications   Discharge Prescriptions    No medications on file       No discharge procedures on file      PDMP Review       Value Time User    PDMP Reviewed  Yes 11/22/2022  3:19 PM Reshma Cardenas DO          ED Provider  Electronically Signed by           Antonio Mooney PA-C  12/01/22 8973

## 2022-12-01 NOTE — TELEPHONE ENCOUNTER
Patient currently in ER  Scheduled for Webb removal with nurse tomorrow s/p dilation with Dr Olesya Torres  Voicemail message left for patients significant other that will keep appointment as scheduled for tomorrow as of now  If patient is admitted to the hospital and not discharged, can re-schedule appointment tomorrow

## 2022-12-01 NOTE — ASSESSMENT & PLAN NOTE
· Patient had cystoscopy with dilatation of the urethra and placement of Webb catheter yesterday by Urology  Patient came to the hospital with pain due to the Webb catheter  · [place] to be secondary to bladder spasm    · Continue with oxybutynin  · Pain control  · Consult urology  · Likely able to initiate voiding trial by tomorrow

## 2022-12-01 NOTE — CONSULTS
Consults: UROLOGY  Bree Pavon 74 y o  male [id number]   Unit/Bed #: ED 13  Encounter: [id number]        Assessment  & Plan  :    75-year-old male with a history of muscle invasive bladder cancer, hydronephrosis managed with right PCNU, capped  status post repeat cystoscopy in the office yesterday requiring dilation of urethral stricture and Webb catheter insertion presenting with penile pain and lethargy:    -urine culture sent from office yesterday, currently pending, treat empirically and adjust antibiotics based upon culture data  -penile pain most likely spasm, plan was for urethral Webb catheter removal in the office tomorrow  Can provide oxybutynin p r n  and urethral lidocaine for discomfort   -urethral Webb catheter currently in place draining clear yellow urine   -continue with medical optimization per primary team, patient is currently hemodynamically stable, afebrile, no leukocytosis and creatinine at baseline   -can remove urethral Webb catheter tomorrow a m  as planned was removal in office  -PCNU in place capped, creatinine at baseline  If patient begins to experience fevers chills would recommend opening PCNU to drainage  Can leave capped at this time as he has no leukocytosis and is afebrile and hemodynamically stable  Urology will follow peripherally, no further urologic intervention required this admission  Penile pain will most likely resolve after removal of urethral Webb catheter, as patient had plan to remove Webb catheter tomorrow a m  in the office  Can this may be removed tomorrow a m         Subjective :    Bree Pavon  is a 74 y o  male well known to our practice with a significant past medical history of alcohol cirrhosis and prior liver transplantation over 20 years ago  Being seen by previous urologist in 2019 for bladder issues  CT scan in May of 2021 did recent question for bladder wall thickening  He did not see urologist in these concerns    Developed gross hematuria and May of 2022, CT scan revealed concern for bladder lesions around the right and distal ureter  Resection the bladder was performed and revealed muscle invasive bladder cancer  Patient discussed at multidisciplinary tumor board and was offered chemotherapy and radiation  Patient required nephrostomy tube due to inability to access ureter at time of initial resection later converted to PCNU now capped  Patient now status post outpatient office cystoscopy yesterday with Dr Hermosillo re-evaluation of urinary bladder  At that time patient underwent dilation due to urethral stricture and insertion of urethral Webb catheter  Was also noted that patient had cloudy urine,  culture was sent at that time and sent home on oral antibiotics  No evidence of recurrence of bladder tumors  He presents to the emergency room today due to lethargy and penile pain with urethral Webb catheter  He currently reports that he has constant persistent pain in the urethra primarily at the tip of the penis  He also reports that he has occasional leakage and that is when his pain is 10/10  He has not been taking the antibiotics prescribed yesterday  He reports that the Webb catheter is draining urine is clear yellow in color  He denies any shortness of breath or chest pain  Family member at bedside also reporting to be his POA and makes/assist in his medical decision-making  She reports that he has been over the past 3-4 days becoming lethargic, decreased appetite  Denies any fevers chills nausea vomiting  Allergies   Allergen Reactions   • Tequin [Gatifloxacin] Rash   • Lipitor [Atorvastatin] Other (See Comments)     Rash and itching   • Ciprofloxacin    • Ciprofloxacin Rash   • Iohexol Other (See Comments)     Unknown     • Iv Contrast [Iodinated Diagnostic Agents]    • Iv Contrast [Iodinated Diagnostic Agents] Other (See Comments)     Unknown   • Levofloxacin Rash   • Levofloxacin Other (See Comments) Unknown  • Lipitor [Atorvastatin] Rash     Rash and itching   • Omnipaque [Iohexol]       Current Outpatient Medications   Medication Instructions   • acetaminophen (TYLENOL) 975 mg, Oral, Every 8 hours scheduled   • Alcohol Swabs (B-D SINGLE USE SWABS REGULAR) PADS USE 2-3 TIMES DAILY AS NEEDED   • Blood Glucose Calibration (OT ULTRA/FASTTK CNTRL SOLN) SOLN USE AS DIRECTED   • cefuroxime (CEFTIN) 250 mg, Oral, Every 24 hours   • clopidogrel (PLAVIX) 75 mg, Oral, Daily   • Comfort EZ Pen Needles 32G X 4 MM MISC USE 3-4 AS DIRECTED   • Continuous Blood Gluc  (FreeStyle Frank 14 Day [place]) NATALIA 1 Device, Does not apply, Continuous   • Continuous Blood Gluc Sensor (FreeStyle Frank 14 Day Sensor) MISC 1 Device, Does not apply, 4 times daily   • Incontinence Supply Disposable (Incontinence Brief Medium) MISC Does not apply, 4 times daily   • Lancet Devices (Lancing Device) MISC USE AS DIRECTED   • Levemir FlexTouch 15 Units, Subcutaneous, Daily at bedtime   • loperamide (IMODIUM) 2 mg, Oral, 2 times daily PRN, [facility] por cynthia si tiene diarrea   • Nutritional Supplements (Glucerna Shake) LIQD 1 Bottle, Oral, 3 times daily   • OneTouch Ultra test strip TEST 3-4 TIMES A DAY   • pregabalin (LYRICA) 75 mg, Oral, 2 times daily   • rosuvastatin (CRESTOR) 40 MG tablet TAKE ONE (1) TABLET BY MOUTH DAILY   • sodium chloride 10 ML BY INTRACATHETER ROUTE DAILY INTRACATHETER FLUSHING DAILY   MAY SUBSTITUTE PREFILLED SYRINGE WITH NORMAL SALINE 10 ML VIALS, 10 ML SYRI   • tacrolimus (PROGRAF) 1 mg, Oral, Every 12 hours   • tamsulosin (FLOMAX) 0 4 mg, Oral, Daily with dinner   • temazepam (RESTORIL) 15 mg, Oral, Daily at bedtime PRN   • zolpidem (AMBIEN) 5 mg, Oral, Daily at bedtime PRN      Past Medical History:   Diagnosis Date   • Alcoholism ([facility] )    • Anemia    • Bladder cancer ([facility] )    • Cerebral artery aneurysm    • Change in mental state     last assessed 5/18/15; resolved 10/27/15   • Diabetes mellitus ([facility] )    • Drug dependence ([facility] )    • Fatigue     last assessed 1/26/15; resolved 5/24/16   • Hepatitis C    • Hospital discharge follow-up 12/21/2018   • Hx of liver transplant [facility])    • Hydronephrosis of right kidney    • Kidney disease    • 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HCC)    • Liver cirrhosis ([facility] )    • Liver transplant recipient [facility])    • Liver [facility] status ([facility]  )    • Renal disorder    • Stroke (cerebrum) ([facility] )    • Stroke ([facility] )    • Subdural hygroma     2/27/14; resolved 7/28/15   • Thrombocytopenia ([facility] )    • Thrombocytopenia ([facility] ) 9/20/2017     Past Surgical History:   Procedure Laterality Date   • BRAIN SURGERY  02/12/2014    left frontotemporal 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   • CATARACT EXTRACTION     • CHOLECYSTECTOMY     • CYSTOSCOPY  11/30/2022    [place]   • FL LUMBAR PUNCTURE DIAGNOSTIC  12/14/2018   • HEPATITIS B SURFACE AB QN,LIVER TRANSPLANT (HISTORICAL)     • IR NEPHROSTOMY TUBE CHECK/CHANGE/REPOSITION/REINSERTION/UPSIZE  07/29/2022   • IR NEPHROSTOMY TUBE CHECK/CHANGE/REPOSITION/REINSERTION/UPSIZE  08/31/2022   • IR NEPHROSTOMY TUBE CHECK/CHANGE/REPOSITION/REINSERTION/UPSIZE  10/07/2022   • IR NEPHROSTOMY TUBE PLACEMENT  05/28/2022   • IR PICC LINE  12/14/2018   • IR PORT PLACEMENT  06/23/2022   • LIVER TRANSPLANTATION     • ROTATOR CUFF REPAIR     • SHOULDER SURGERY     • TRANSURETHRAL RESECTION OF BLADDER TUMOR N/A 05/26/2022    Procedure: TRANSURETHRAL RESECTION OF BLADDER TUMOR (TURBT);   Surgeon: Lynda Powell MD;  Location: [facility];  Service: Urology     Family History   Problem Relation Age of Onset   • Hypertension Mother         benign essential    • Lung cancer Father    • Diabetes Sister    • Cancer Brother    • Stroke Other         cva - due to embolism of cerebra artery      Social History     Socioeconomic History   • Marital status: Single     Spouse name: None   • Number of children: None   • Years of education: less then high school   • Highest education level: None   Occupational History   • Occupation: physical disability    Tobacco Use   • Smoking status: Former     Packs/day: 1 00     Types: Cigarettes   • Smokeless tobacco: Never   • Tobacco comments:     former smoker per allscripts    Vaping Use   • Vaping Use: Never used   Substance and Sexual Activity   • Alcohol use: Not Currently   • Drug use: No     Comment: remotely quit drug use per allscripts    • Sexual activity: Not Currently   Other Topics Concern   • None   Social History Narrative    ** Merged History Encounter **         ** Merged History Encounter **         Caffeine use   Living with significant other , girlfriend and daughter      Social Determinants of Health     Financial Resource Strain: Not on file   Food Insecurity: No Food Insecurity   • Worried About Running Out of Food in the Last Year: Never true   • Ran Out of Food in the Last Year: Never true   Transportation Needs: No Transportation Needs   • Lack of Transportation (Medical): No   • Lack of Transportation (Non-Medical): No   Physical Activity: Not on file   Stress: Not on file   Social Connections: Not on file   Intimate Partner Violence: Not on file   Housing Stability: Low Risk    • Unable to Pay for Housing in the Last Year: No   • Number of Places Lived in the Last Year: 1   • Unstable Housing in the Last Year: No        Review of Systems   Constitutional: Positive for activity change, appetite change and fatigue  Negative for chills and fever  Gastrointestinal: Negative for abdominal pain, diarrhea, nausea and vomiting  Genitourinary: Positive for penile pain  Negative for difficulty urinating, dysuria, flank pain, frequency, genital sores and hematuria  Objective     Physical Exam  Constitutional:       General: He is not in acute distress  Appearance: He is normal weight  He is ill-appearing  He is not toxic-appearing     HENT:      Head: Normocephalic and atraumatic  Right Ear: External ear normal       Left Ear: External ear normal       Nose: Nose normal       Mouth/Throat:      Pharynx: Oropharynx is clear  Eyes:      General: No scleral icterus  Conjunctiva/sclera: Conjunctivae normal    Cardiovascular:      Rate and Rhythm: Normal rate and regular rhythm  Pulses: Normal pulses  Heart sounds: No murmur heard  No friction rub  No gallop  Pulmonary:      Effort: Pulmonary effort is normal  No respiratory distress  Breath sounds: No wheezing, rhonchi or rales  Abdominal:      General: Bowel sounds are normal    Musculoskeletal:      Cervical back: Normal range of motion  Skin:     Coloration: Skin is pale  Neurological:      Mental Status: He is alert  Imaging:  CT CHEST, ABDOMEN AND PELVIS WITHOUT IV CONTRAST     INDICATION:   33-year-old male with history of bladder cancer (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) status post transurethral resection of bladder tumor on 5/26/2022 and cirrhosis status post liver transplant in   2003         COMPARISON:  CT chest, abdomen, pelvis 10/8/2022      TECHNIQUE: CT examination of the chest, abdomen and pelvis was performed without intravenous contrast  Axial, sagittal, and coronal 2D reformatted images were created from the source data and submitted for interpretation        Radiation dose length product (DLP) for this visit:  518 18 mGy-cm   This examination, like all CT scans performed in the [facility], was performed utilizing techniques to minimize radiation dose exposure, including the use of iterative   reconstruction and automated exposure control       Enteric contrast was not administered       FINDINGS:     CHEST     LUNGS:  No tracheal or endobronchial lesion    Subsegmental atelectasis in the lingula and the left lower lobe      A 9 mm right upper lobe solid pulmonary nodule (3/21), unchanged in size since 10/8/2022, but new since 5/23/2021      A 3 mm right upper lobe calcified granuloma (3/36)  A 4 mm right middle lobe triangular shaped juxtapleural pulmonary nodule (3/51), stable since 5/23/2021, likely representing an intrapulmonary lymph node      PLEURA:  Unremarkable      HEART/GREAT VESSELS: The heart is not enlarged  No pericardial effusion  Mild coronary artery calcifications  No thoracic aortic aneurysm  Mild atherosclerotic calcifications of the thoracic aorta      MEDIASTINUM AND JOHN: Stable mildly enlarged precarinal lymph node measuring 1 2 cm in short axis (2/21) since 5/23/2021      CHEST WALL AND LOWER NECK: Right chest wall vascular access port with tip in the right atrium  Mild bilateral gynecomastia      ABDOMEN     LIVER/BILIARY TREE:  Postsurgical changes of liver transplantation  No suspicious hepatic mass identified      GALLBLADDER:  Gallbladder is surgically absent      SPLEEN:  Unremarkable      PANCREAS:  Unremarkable      ADRENAL GLANDS:  Unremarkable      KIDNEYS/URETERS: Persistent mild right hydronephrosis with a right percutaneous nephroureteral stent in appropriate position, unchanged in position since prior study  Stable mural thickening of the mid to distal right ureter (2/86) with surrounding   periureteral stranding since CT 6/9/2022      STOMACH AND BOWEL:  Unremarkable      APPENDIX:  No findings to suggest appendicitis      ABDOMINOPELVIC CAVITY:  No ascites  No pneumoperitoneum  No lymphadenopathy      VESSELS:  Atherosclerotic changes are present  No evidence of aneurysm      PELVIS     REPRODUCTIVE ORGANS:  The prostate is nonenlarged  Intraprostatic calcifications      URINARY BLADDER:  Underdistended, limiting its evaluation      ABDOMINAL WALL/INGUINAL REGIONS:  Small bilateral fat-containing inguinal hernias      OSSEOUS STRUCTURES:  No acute fracture or destructive osseous lesion  Degenerative changes of the visualized spine    Unchanged inferior mild wedge compression deformity of the L2 vertebral body  Degenerative changes of the bilateral hips  Deformities   of the right ribs, consistent with healed right rib fractures      IMPRESSION:     1    A 9 mm right upper lobe solid pulmonary nodule, unchanged in size since 10/8/2022, but new since 5/23/2021, this finding is again suspicious for a pulmonary metastasis      2   Persistent mild right hydronephrosis with a right percutaneous nephroureteral stent in appropriate position      3   Stable mural thickening of the mid to distal right ureter with surrounding periureteral stranding since CT 6/9/2022        Labs:  Lab Results   Component Value Date    SODIUM 137 12/01/2022    K 4 9 12/01/2022     (H) 12/01/2022    CO2 20 (L) 12/01/2022    BUN 57 (H) 12/01/2022    CREATININE 2 81 (H) 12/01/2022    GLUC 119 12/01/2022    CALCIUM 9 2 12/01/2022         Lab Results   Component Value Date    WBC 8 25 12/01/2022    HGB 10 4 (L) 12/01/2022    HCT 33 8 (L) 12/01/2022    MCV 79 (L) 12/01/2022     (L) 12/01/2022         VTE Pharmacologic Prophylaxis: Heparin  VTE Mechanical Prophylaxis: sequential compression device     Juan Peetrs PA-C

## 2022-12-01 NOTE — ASSESSMENT & PLAN NOTE
· Known history of bladder tumor status post resection and radiation  · Follow-up with Urology  · Patient with right-sided nephrostomy tube-urology is considering internalization   · Outpatient follow-up with Urology

## 2022-12-01 NOTE — ASSESSMENT & PLAN NOTE
Lab Results   Component Value Date    EGFR 21 12/01/2022    EGFR 25 11/29/2022    EGFR 23 10/13/2022    CREATININE 2 81 (H) 12/01/2022    CREATININE 2 42 (H) 11/29/2022    CREATININE 2 55 (H) 10/13/2022   Creatinine 2 81  Closer to baseline  Monitor creatinine    Follow-up with nephrology as outpatient

## 2022-12-01 NOTE — H&P
[facility]  H&P- Adrien Herrera 1950, 74 y o  male MRN: [id number]  Unit/Bed#: ED 13 Encounter: [id number]  Primary Care Provider: Carmencita Parker DO   Date and time admitted to hospital: 12/1/2022  6:50 AM    * Bladder spasms  Assessment & Plan  · Patient had cystoscopy with dilatation of the urethra and placement of Webb catheter yesterday by Urology  Patient came to the hospital with pain due to the Webb catheter  · [place] to be secondary to bladder spasm  · Continue with oxybutynin  · Pain control  · Consult urology  · Likely able to initiate voiding trial by tomorrow    Suspected UTI  Assessment & Plan  · Patient had cystoscopy with dilatation of the urethra and Webb catheter placement yesterday  Noted to have cloudy urine and started on antibiotics  · Patient was unable to take the antibiotics at home due to pain  · IV ceftriaxone  · Follow-up on the cultures and transition to p o  Antibiotics  · Follow-up on the urine culture and blood culture    [facility])  Assessment & Plan  · Monitor mental status  · Avoid sedating medication    Hydronephrosis  Assessment & Plan  · Patient had right-sided percutaneous nephrostomy tube  · Currently capped-urology evaluation appreciated    Anemia due to chronic kidney disease  Assessment & Plan  · Monitor H&H    Stage 4 chronic kidney disease [facility])  Assessment & Plan  Lab Results   Component Value Date    EGFR 21 12/01/2022    EGFR 25 11/29/2022    EGFR 23 10/13/2022    CREATININE 2 81 (H) 12/01/2022    CREATININE 2 42 (H) 11/29/2022    CREATININE 2 55 (H) 10/13/2022   Creatinine 2 81  Closer to baseline  Monitor creatinine    Follow-up with nephrology as outpatient    Anemia  Assessment & Plan  · Hemoglobin at 10 2  · Monitor    Malignant neoplasm of trigone of urinary bladder (HCC)  Assessment & Plan  · Known history of bladder tumor status post resection and radiation  · Follow-up with Urology  · Patient with right-sided nephrostomy tube-urology is considering internalization   · Outpatient follow-up with Urology    Thrombocytopenia [facility])  Assessment & Plan  · Monitor for now  · At baseline    Liver transplant status [facility])  Assessment & Plan  · Continue with Prograf  · Check Prograf lower    Controlled diabetes mellitus with diabetic neuropathy, with long-term current use of insulin [facility])  Assessment & Plan  Lab Results   Component Value Date    HGBA1C 6 8 (H) 08/19/2022       Recent Labs     12/01/22  1317 12/01/22  1640   POCGLU 210* 174*       Blood Sugar Average: Last 72 hrs:  (P) 192   Patient is on insulin as outpatient  Continue with the Levemir and add sliding scale  Monitor blood sugar  Adjust insulin dose per blood sugar      VTE Pharmacologic Prophylaxis: VTE Score: 4 Moderate Risk (Score 3-4) - Pharmacological DVT Prophylaxis Ordered: heparin  Code Status: Level 1 - Full Code   Discussion with family: significant  Other updated over phone     Anticipated Length of Stay: Patient will be admitted on an observation basis with an anticipated length of stay of less than 2 midnights secondary to Bladder spasm  Total Time for Visit, including Counseling / Coordination of Care: 60 minutes Greater than 50% of this total time spent on direct patient counseling and coordination of care  Chief Complaint:  Generalized weakness and bladder spasm    History of Present Illness:  Mackenzie Juan is a 74 y o  male with a PMH of invasive bladder tumor status post resection right-sided hydronephrosis managed with percutaneous nephrostomy tube, type 2 diabetes mellitus hypertension CKD stage for who presents with panel pain and bladder spasm  Patient had a urology procedure done yesterday in the office with cystoscopy and dilatation of the urethra with Webb catheter placement  Since then he has been having panel pain and spasms  He denies any lethargy or change in mental status pain I evaluated him    Patient's main complaint is the pain   Due to persistent pain patient was brought to the hospital   During need urologic procedure patient noted to have cloudy urine and was started on oral antibiotics  Patient was unable to take the antibiotics due to pain  Patient denies any fever or any chills  Neurology is considering internalization/stent placement for the hydronephrosis  Patient reported that he was supposed to have an appointment with the nephrologist today  Denies any fever chills  Denies any nausea vomiting diarrhea  Denies any chest pain or shortness of breath  Review of Systems:  Review of Systems   Constitutional: Positive for appetite change and fatigue  HENT: Negative  Eyes: Negative  Respiratory: Negative  Cardiovascular: Negative  Gastrointestinal: Negative  Genitourinary: Positive for penile pain  Bladder spasm   Musculoskeletal: Negative  Skin: Negative  Allergic/Immunologic: Negative  Neurological: Negative  Hematological: Negative  Psychiatric/Behavioral: Negative          Past Medical and Surgical History:   Past Medical History:   Diagnosis Date   • Alcoholism ([facility] )    • Anemia    • Bladder cancer ([place] [address] )    • Cerebral artery aneurysm    • Change in mental state     last assessed 5/18/15; resolved 10/27/15   • Diabetes mellitus ([place] [address] )    • Drug dependence ([place] [address] )    • Fatigue     last assessed 1/26/15; resolved 5/24/16   • Hepatitis C    • Hospital discharge follow-up 12/21/2018   • Hx of liver transplant [facility])    • Hydronephrosis of right kidney    • Kidney disease    • 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[facility] )    • Liver cirrhosis ([place] [address] )    • Liver transplant recipient [facility])    • Liver transplant status ([place] [address] )    • Renal disorder    • Stroke (cerebrum) ([place] [address] )    • Stroke [facility])    • Subdural hygroma     2/27/14; resolved 7/28/15   • Thrombocytopenia ([facility] )    • Thrombocytopenia ([facility] ) 9/20/2017       Past Surgical History:   Procedure Laterality Date   • BRAIN SURGERY  02/12/2014    left frontotemporal 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   • CATARACT EXTRACTION     • CHOLECYSTECTOMY     • CYSTOSCOPY  11/30/2022    [place]   • FL LUMBAR PUNCTURE DIAGNOSTIC  12/14/2018   • HEPATITIS B SURFACE AB QN,LIVER TRANSPLANT (HISTORICAL)     • IR NEPHROSTOMY TUBE CHECK/CHANGE/REPOSITION/REINSERTION/UPSIZE  07/29/2022   • IR NEPHROSTOMY TUBE CHECK/CHANGE/REPOSITION/REINSERTION/UPSIZE  08/31/2022   • IR NEPHROSTOMY TUBE CHECK/CHANGE/REPOSITION/REINSERTION/UPSIZE  10/07/2022   • IR NEPHROSTOMY TUBE PLACEMENT  05/28/2022   • IR PICC LINE  12/14/2018   • IR PORT PLACEMENT  06/23/2022   • LIVER TRANSPLANTATION     • ROTATOR CUFF REPAIR     • SHOULDER SURGERY     • TRANSURETHRAL RESECTION OF BLADDER TUMOR N/A 05/26/2022    Procedure: TRANSURETHRAL RESECTION OF BLADDER TUMOR (TURBT); Surgeon: Sav Rivers MD;  Location: [facility];  Service: Urology       Meds/Allergies:  Prior to Admission medications    Medication Sig Start Date End Date Taking?  Authorizing Provider   acetaminophen (TYLENOL) 325 mg tablet Take 3 tablets (975 mg total) by mouth every 8 (eight) hours 9/22/22   Kel Longo PA-C   Alcohol Swabs (B-D SINGLE USE SWABS REGULAR) PADS USE 2-3 TIMES DAILY AS NEEDED 11/14/22   Historical Provider, MD   Blood Glucose Calibration (OT ULTRA/FASTTK CNTRL SOLN) SOLN USE AS DIRECTED 7/14/22   Claude Ramos DO   cefuroxime (CEFTIN) 250 mg tablet Take 1 tablet (250 mg total) by mouth every 24 hours for 5 days 11/30/22 12/5/22  Sav Rivers MD   clopidogrel (PLAVIX) 75 mg tablet TAKE 1 TABLET (75 MG TOTAL) BY MOUTH DAILY 11/8/22   Claude Ramos DO   Comfort EZ Pen Needles 32G X 4 MM MISC USE 3-4 AS DIRECTED 5/17/22   Claude Ramos DO   Continuous Blood Gluc  (FreeStyle Frank 14 Day [place]) NATALIA Use 1 Device continuous  Patient not taking: Reported on 10/17/2022 6/3/22   Claude Ramos DO   Continuous Blood Gluc Sensor (FreeStyle Frank 14 Day Sensor) MISC Use 1 Device 4 (four) times a day 6/3/22   Marry Issa DO   Incontinence Supply Disposable (Incontinence Brief Medium) MISC Use 4 (four) times a day 8/19/22 10/17/22  Jorge Augustin MD   insulin detemir (Levemir FlexTouch) 100 Units/mL injection pen Inject 15 Units under the skin daily at bedtime 9/26/22   Marry Issa DO   Lancet Devices (Lancing Device) MISC USE AS DIRECTED 7/14/22   Marry Issa DO   loperamide (IMODIUM) 2 mg capsule Take 1 capsule (2 mg total) by mouth 2 (two) times a day as needed for diarrhea [employer] por cynthia si tiene diarrea 8/26/22   Billy Gomez,    Nutritional Supplements (Glucerna Shake) LIQD Take 1 Bottle by mouth 3 (three) times a day 8/19/22   Jorge Augustin MD   OneTouch Ultra test strip TEST 3-4 TIMES A DAY 10/28/22   Marry Issa DO   pregabalin (LYRICA) 75 mg capsule Take 1 capsule (75 mg total) by mouth 2 (two) times a day for 10 days 11/1/22 11/30/22  Marry Issa DO   rosuvastatin (CRESTOR) 40 MG tablet TAKE ONE (1) TABLET BY MOUTH DAILY 11/1/22   Marry Issa DO   sodium chloride 10 ML BY INTRACATHETER ROUTE DAILY INTRACATHETER FLUSHING DAILY  MAY SUBSTITUTE PREFILLED SYRINGE WITH NORMAL SALINE 10 ML VIALS, 10 ML SYRI 10/10/22   Historical Provider, MD   tacrolimus (PROGRAF) 1 mg capsule TAKE 1 CAPSULE (1 MG TOTAL) BY MOUTH EVERY 12 (TWELVE) HOURS 9/27/22   Marry Issa DO   tamsulosin (FLOMAX) 0 4 mg Take 1 capsule (0 4 mg total) by mouth daily with dinner 10/24/22 1/22/23  Christiana Haynes PA-C   temazepam (RESTORIL) 7 5 mg capsule Take 2 capsules (15 mg total) by mouth daily at bedtime as needed for sleep 10/31/22   Marry Issa DO   zolpidem (AMBIEN) 5 mg tablet Take 1 tablet (5 mg total) by mouth daily at bedtime as needed for sleep for up to 10 days 10/31/22 11/30/22  Marry Issa DO     I have reviewed home medications with patient personally  Allergies:    Allergies   Allergen Reactions   • Tequin [Gatifloxacin] Rash   • Lipitor [Atorvastatin] Other (See Comments)     Rash and itching   • Ciprofloxacin    • Ciprofloxacin Rash   • Iohexol Other (See Comments)     Unknown  • Iv Contrast [Iodinated Diagnostic Agents]    • Iv Contrast [Iodinated Diagnostic Agents] Other (See Comments)     Unknown   • Levofloxacin Rash   • Levofloxacin Other (See Comments)     Unknown  • Lipitor [Atorvastatin] Rash     Rash and itching   • Omnipaque [Iohexol]        Social History:  Marital Status: Single   Occupation:   Patient Pre-hospital Living Situation: Home  Patient Pre-hospital Level of Mobility: walks  Patient Pre-hospital Diet Restrictions:   Substance Use History:   Social History     Substance and Sexual Activity   Alcohol Use Not Currently     Social History     Tobacco Use   Smoking Status Former   • Packs/day: 1 00   • Types: Cigarettes   Smokeless Tobacco Never   Tobacco Comments    former smoker per [employer]scripts      Social History     Substance and Sexual Activity   Drug Use No    Comment: remotely quit drug use per allscripts        Family History:  Family History   Problem Relation Age of Onset   • Hypertension Mother         benign essential    • Lung cancer Father    • Diabetes Sister    • Cancer Brother    • Stroke Other         cva - due to embolism of cerebra artery        Physical Exam:     Vitals:   Blood Pressure: 136/75 (12/01/22 1345)  Pulse: 80 (12/01/22 1345)  Temperature: (!) 97 2 °F (36 2 °C) (12/01/22 0704)  Temp Source: Tympanic (12/01/22 0704)  Respirations: 18 (12/01/22 1345)  SpO2: 100 % (12/01/22 1345)    Physical Exam  Constitutional:       General: He is not in acute distress  Appearance: He is not ill-appearing  HENT:      Head: Normocephalic  Nose: Nose normal    Eyes:      General: No scleral icterus  Cardiovascular:      Rate and Rhythm: Normal rate and regular rhythm  Heart sounds: Normal heart sounds  Pulmonary:      Breath sounds: Normal breath sounds  Abdominal:      General: There is no distension  Genitourinary:     Comments: Webb catheter present  Musculoskeletal:      Cervical back: Neck supple  Skin:     General: Skin is warm  Neurological:      General: No focal deficit present  Mental Status: He is alert  Additional Data:     Lab Results:  Results from last 7 days   Lab Units 12/01/22  0737   WBC Thousand/uL 8 25   HEMOGLOBIN g/dL 10 4*   HEMATOCRIT % 33 8*   PLATELETS Thousands/uL 113*   NEUTROS PCT % 77*   LYMPHS PCT % 9*   MONOS PCT % 10   EOS PCT % 2     Results from last 7 days   Lab Units 12/01/22  0737   SODIUM mmol/L 137   POTASSIUM mmol/L 4 9   CHLORIDE mmol/L 111*   CO2 mmol/L 20*   BUN mg/dL 57*   CREATININE mg/dL 2 81*   ANION GAP mmol/L 6   CALCIUM mg/dL 9 2   ALBUMIN g/dL 2 9*   TOTAL BILIRUBIN mg/dL 0 35   ALK PHOS U/L 75   ALT U/L 22   AST U/L 27   GLUCOSE RANDOM mg/dL 119     Results from last 7 days   Lab Units 12/01/22  0737   INR  1 10     Results from last 7 days   Lab Units 12/01/22  1640 12/01/22  1317   POC GLUCOSE mg/dl 174* 210*               Lines/Drains:  Invasive Devices     Central Venous Catheter Line  Duration           Port A Cath Right Subclavian -- days    Port A Cath Subclavian -- days    Port A Cath 06/23/22 Right Chest 161 days          Peripheral Intravenous Line  Duration           Peripheral IV 12/01/22 Dorsal (posterior); Right Forearm <1 day          Drain  Duration           Percutaneous Nephroureteral Tube (PCNU) Right 1 -- days    Percutaneous Nephroureteral Tube (PCNU) Right 1 10 2 Fr 26 Cm 55 days                Central Line:  Goal for removal: Right-sided Port-A-Cath           Imaging:  No orders to display       EKG and Other Studies Reviewed on Admission:   · EKG: No EKG obtained  ** Please Note: This note has been constructed using a voice recognition system   **

## 2022-12-01 NOTE — ASSESSMENT & PLAN NOTE
Lab Results   Component Value Date    HGBA1C 6 8 (H) 08/19/2022       Recent Labs     12/01/22  1317 12/01/22  1640   POCGLU 210* 174*       Blood Sugar Average: Last 72 hrs:  (P) 192   Patient is on insulin as outpatient    Continue with the Levemir and add sliding scale  Monitor blood sugar  Adjust insulin dose per blood sugar

## 2022-12-02 PROBLEM — E87.20 METABOLIC ACIDOSIS: Status: ACTIVE | Noted: 2022-12-02

## 2022-12-02 PROBLEM — E87.20 METABOLIC ACIDOSIS: Status: ACTIVE | Noted: 2022-01-01

## 2022-12-02 LAB
ANION GAP SERPL CALCULATED.3IONS-SCNC: 12 MMOL/L (ref 4–13)
ANION GAP SERPL CALCULATED.3IONS-SCNC: 4 MMOL/L (ref 4–13)
ANION GAP SERPL CALCULATED.3IONS-SCNC: 4 MMOL/L (ref 4–13)
BASOPHILS # BLD AUTO: 0.03 THOUSANDS/ÂΜL (ref 0–0.1)
BASOPHILS NFR BLD AUTO: 1 % (ref 0–1)
BUN SERPL-MCNC: 46 MG/DL (ref 5–25)
BUN SERPL-MCNC: 51 MG/DL (ref 5–25)
BUN SERPL-MCNC: 53 MG/DL (ref 5–25)
CALCIUM SERPL-MCNC: 7.5 MG/DL (ref 8.3–10.1)
CALCIUM SERPL-MCNC: 8.3 MG/DL (ref 8.3–10.1)
CALCIUM SERPL-MCNC: 8.8 MG/DL (ref 8.3–10.1)
CHLORIDE SERPL-SCNC: 108 MMOL/L (ref 96–108)
CHLORIDE SERPL-SCNC: 115 MMOL/L (ref 96–108)
CHLORIDE SERPL-SCNC: 96 MMOL/L (ref 96–108)
CO2 SERPL-SCNC: 11 MMOL/L (ref 21–32)
CO2 SERPL-SCNC: 23 MMOL/L (ref 21–32)
CO2 SERPL-SCNC: 33 MMOL/L (ref 21–32)
CREAT SERPL-MCNC: 2.34 MG/DL (ref 0.6–1.3)
CREAT SERPL-MCNC: 2.46 MG/DL (ref 0.6–1.3)
CREAT SERPL-MCNC: 2.56 MG/DL (ref 0.6–1.3)
EOSINOPHIL # BLD AUTO: 0.32 THOUSAND/ÂΜL (ref 0–0.61)
EOSINOPHIL NFR BLD AUTO: 6 % (ref 0–6)
ERYTHROCYTE [DISTWIDTH] IN BLOOD BY AUTOMATED COUNT: 15.7 % (ref 11.6–15.1)
GFR SERPL CREATININE-BSD FRML MDRD: 23 ML/MIN/1.73SQ M
GFR SERPL CREATININE-BSD FRML MDRD: 24 ML/MIN/1.73SQ M
GFR SERPL CREATININE-BSD FRML MDRD: 26 ML/MIN/1.73SQ M
GLUCOSE SERPL-MCNC: 106 MG/DL (ref 65–140)
GLUCOSE SERPL-MCNC: 191 MG/DL (ref 65–140)
GLUCOSE SERPL-MCNC: 195 MG/DL (ref 65–140)
GLUCOSE SERPL-MCNC: 213 MG/DL (ref 65–140)
GLUCOSE SERPL-MCNC: 660 MG/DL (ref 65–140)
GLUCOSE SERPL-MCNC: 71 MG/DL (ref 65–140)
GLUCOSE SERPL-MCNC: 77 MG/DL (ref 65–140)
HCT VFR BLD AUTO: 36.8 % (ref 36.5–49.3)
HGB BLD-MCNC: 11.3 G/DL (ref 12–17)
IMM GRANULOCYTES # BLD AUTO: 0.02 THOUSAND/UL (ref 0–0.2)
IMM GRANULOCYTES NFR BLD AUTO: 0 % (ref 0–2)
LYMPHOCYTES # BLD AUTO: 1.03 THOUSANDS/ÂΜL (ref 0.6–4.47)
LYMPHOCYTES NFR BLD AUTO: 19 % (ref 14–44)
MCH RBC QN AUTO: 24.3 PG (ref 26.8–34.3)
MCHC RBC AUTO-ENTMCNC: 30.7 G/DL (ref 31.4–37.4)
MCV RBC AUTO: 79 FL (ref 82–98)
MONOCYTES # BLD AUTO: 0.67 THOUSAND/ÂΜL (ref 0.17–1.22)
MONOCYTES NFR BLD AUTO: 12 % (ref 4–12)
NEUTROPHILS # BLD AUTO: 3.5 THOUSANDS/ÂΜL (ref 1.85–7.62)
NEUTS SEG NFR BLD AUTO: 62 % (ref 43–75)
NRBC BLD AUTO-RTO: 0 /100 WBCS
PLATELET # BLD AUTO: 98 THOUSANDS/UL (ref 149–390)
PMV BLD AUTO: 11.4 FL (ref 8.9–12.7)
POTASSIUM SERPL-SCNC: 3.8 MMOL/L (ref 3.5–5.3)
POTASSIUM SERPL-SCNC: 3.9 MMOL/L (ref 3.5–5.3)
POTASSIUM SERPL-SCNC: 4.5 MMOL/L (ref 3.5–5.3)
RBC # BLD AUTO: 4.65 MILLION/UL (ref 3.88–5.62)
SODIUM SERPL-SCNC: 133 MMOL/L (ref 135–147)
SODIUM SERPL-SCNC: 135 MMOL/L (ref 135–147)
SODIUM SERPL-SCNC: 138 MMOL/L (ref 135–147)
WBC # BLD AUTO: 5.57 THOUSAND/UL (ref 4.31–10.16)

## 2022-12-02 RX ADMIN — SODIUM BICARBONATE 100 ML/HR: 84 INJECTION, SOLUTION INTRAVENOUS at 14:33

## 2022-12-02 RX ADMIN — TEMAZEPAM 15 MG: 15 CAPSULE ORAL at 22:32

## 2022-12-02 RX ADMIN — TACROLIMUS 1 MG: 1 CAPSULE ORAL at 22:25

## 2022-12-02 RX ADMIN — TACROLIMUS 1 MG: 1 CAPSULE ORAL at 09:15

## 2022-12-02 RX ADMIN — CEFTRIAXONE SODIUM 1000 MG: 10 INJECTION, POWDER, FOR SOLUTION INTRAVENOUS at 13:11

## 2022-12-02 RX ADMIN — INSULIN DETEMIR 12 UNITS: 100 INJECTION, SOLUTION SUBCUTANEOUS at 22:20

## 2022-12-02 RX ADMIN — INSULIN LISPRO 1 UNITS: 100 INJECTION, SOLUTION INTRAVENOUS; SUBCUTANEOUS at 18:50

## 2022-12-02 RX ADMIN — HEPARIN SODIUM 5000 UNITS: 5000 INJECTION INTRAVENOUS; SUBCUTANEOUS at 22:24

## 2022-12-02 RX ADMIN — PREGABALIN 75 MG: 75 CAPSULE ORAL at 09:15

## 2022-12-02 RX ADMIN — HEPARIN SODIUM 5000 UNITS: 5000 INJECTION INTRAVENOUS; SUBCUTANEOUS at 05:29

## 2022-12-02 RX ADMIN — TAMSULOSIN HYDROCHLORIDE 0.4 MG: 0.4 CAPSULE ORAL at 18:48

## 2022-12-02 RX ADMIN — HYDROMORPHONE HYDROCHLORIDE 0.2 MG: 0.2 INJECTION, SOLUTION INTRAMUSCULAR; INTRAVENOUS; SUBCUTANEOUS at 00:52

## 2022-12-02 RX ADMIN — INSULIN LISPRO 1 UNITS: 100 INJECTION, SOLUTION INTRAVENOUS; SUBCUTANEOUS at 22:27

## 2022-12-02 RX ADMIN — HEPARIN SODIUM 5000 UNITS: 5000 INJECTION INTRAVENOUS; SUBCUTANEOUS at 14:01

## 2022-12-02 RX ADMIN — CLOPIDOGREL BISULFATE 75 MG: 75 TABLET ORAL at 09:15

## 2022-12-02 RX ADMIN — PREGABALIN 75 MG: 75 CAPSULE ORAL at 18:48

## 2022-12-02 NOTE — ASSESSMENT & PLAN NOTE
Lab Results   Component Value Date    EGFR 23 12/02/2022    EGFR 21 12/01/2022    EGFR 25 11/29/2022    CREATININE 2 56 (H) 12/02/2022    CREATININE 2 81 (H) 12/01/2022    CREATININE 2 42 (H) 11/29/2022   Creatinine 2 81  Closer to baseline  Monitor creatinine    Follow-up with nephrology as outpatient

## 2022-12-02 NOTE — ASSESSMENT & PLAN NOTE
· Patient had cystoscopy with dilatation of the urethra and placement of Webb catheter yesterday by Urology  Patient came to the hospital with pain due to the Webb catheter  · [place] to be secondary to bladder spasm    · Continue with oxybutynin  · Pain control  · Consult urology  · Likely able to initiate voiding trial by tomorrow

## 2022-12-02 NOTE — ASSESSMENT & PLAN NOTE
Lab Results   Component Value Date    HGBA1C 6 8 (H) 08/19/2022       Recent Labs     12/01/22  1317 12/01/22  1640 12/01/22  2116 12/02/22  0758   POCGLU 210* 174* 203* 77       Blood Sugar Average: Last 72 hrs:  (P) 166   Patient is on insulin as outpatient    Continue with the Levemir and add sliding scale  Monitor blood sugar  Adjust insulin dose per blood sugar

## 2022-12-02 NOTE — PROGRESS NOTES
[facility]  Progress Note - Noemi Sides 1950, 74 y o  male MRN: [id number]  Unit/Bed#: [facility] 922-01 Encounter: [id number]  Primary Care Provider: Muriel Jc DO   Date and time admitted to hospital: 12/1/2022  6:50 AM    * Bladder spasms  Assessment & Plan  · Patient had cystoscopy with dilatation of the urethra and placement of Webb catheter yesterday by Urology  Patient came to the hospital with pain due to the Webb catheter  · [place] to be secondary to bladder spasm  · Continue with oxybutynin  · Pain control  · Consult urology  · Likely able to initiate voiding trial by tomorrow    Metabolic acidosis  Assessment & Plan  Bicarbonate drip    Suspected UTI  Assessment & Plan  · Patient had cystoscopy with dilatation of the urethra and Webb catheter placement yesterday  Noted to have cloudy urine and started on antibiotics  · Patient was unable to take the antibiotics at home due to pain  · IV ceftriaxone  · Follow-up on the cultures and transition to p o  Antibiotics  · Follow-up on the urine culture and blood culture    Dementia [facility])  Assessment & Plan  · Monitor mental status  · Avoid sedating medication    Hydronephrosis  Assessment & Plan  · Patient had right-sided percutaneous nephrostomy tube  · Currently capped-urology evaluation appreciated    Anemia due to chronic kidney disease  Assessment & Plan  · Monitor H&H    Stage 4 chronic kidney disease [facility])  Assessment & Plan  Lab Results   Component Value Date    EGFR 23 12/02/2022    EGFR 21 12/01/2022    EGFR 25 11/29/2022    CREATININE 2 56 (H) 12/02/2022    CREATININE 2 81 (H) 12/01/2022    CREATININE 2 42 (H) 11/29/2022   Creatinine 2 81  Closer to baseline  Monitor creatinine    Follow-up with nephrology as outpatient    Anemia  Assessment & Plan  · Hemoglobin at 10 2  · Monitor    Malignant neoplasm of trigone of urinary bladder (HCC)  Assessment & Plan  · Known history of bladder tumor status post resection and radiation  · Follow-up with Urology  · Patient with right-sided nephrostomy tube-urology is considering internalization   · Outpatient follow-up with Urology    Thrombocytopenia [facility])  Assessment & Plan  · Monitor for now  · At baseline    Liver transplant status [facility])  Assessment & Plan  · Continue with Prograf  · Check Prograf lower    Controlled diabetes mellitus with diabetic neuropathy, with long-term current use of insulin [facility])  Assessment & Plan  Lab Results   Component Value Date    HGBA1C 6 8 (H) 2022       Recent Labs     22  1317 22  1640 22  2116 22  0758   POCGLU 210* 174* 203* 77       Blood Sugar Average: Last 72 hrs:  (P) 166   Patient is on insulin as outpatient  Continue with the Levemir and add sliding scale  Monitor blood sugar  Adjust insulin dose per blood sugar      VTE Pharmacologic Prophylaxis:   Pharmacologic: Heparin  Mechanical VTE Prophylaxis in Place: No    Patient Centered Rounds: I have performed bedside rounds with nursing staff today  Time Spent for Care: 15 minutes  More than 50% of total time spent on counseling and coordination of care as described above  Current Length of Stay: 0 day(s)    Current Patient Status: Observation       Code Status: Level 1 - Full Code      Subjective:   nad    Objective:     Vitals:   Temp (24hrs), Av °F (36 7 °C), Min:97 7 °F (36 5 °C), Max:98 1 °F (36 7 °C)    Temp:  [97 7 °F (36 5 °C)-98 1 °F (36 7 °C)] 97 7 °F (36 5 °C)  HR:  [80-86] 80  Resp:  [16-18] 18  BP: ()/(55-82) 122/65  SpO2:  [99 %-100 %] 99 %  Body mass index is 21 26 kg/m²  Input and Output Summary (last 24 hours): Intake/Output Summary (Last 24 hours) at 2022 1055  Last data filed at 2022 0930  Gross per 24 hour   Intake 50 ml   Output 850 ml   Net -800 ml       Physical Exam:     Physical Exam  Constitutional:       Appearance: Normal appearance  HENT:      Head: Normocephalic and atraumatic  Mouth/Throat:      Mouth: Mucous membranes are moist    Cardiovascular:      Rate and Rhythm: Normal rate and regular rhythm  Pulses: Normal pulses  Heart sounds: Normal heart sounds  Pulmonary:      Effort: Pulmonary effort is normal    Abdominal:      General: Abdomen is flat  Palpations: Abdomen is soft  Skin:     General: Skin is warm and dry  Neurological:      General: No focal deficit present  Mental Status: He is alert and oriented to person, place, and time  Psychiatric:         Mood and Affect: Mood normal          Additional Data:     Labs:    Results from last 7 days   Lab Units 12/02/22  1004   WBC Thousand/uL 5 57   HEMOGLOBIN g/dL 11 3*   HEMATOCRIT % 36 8   PLATELETS Thousands/uL 98*   NEUTROS PCT % 62   LYMPHS PCT % 19   MONOS PCT % 12   EOS PCT % 6     Results from last 7 days   Lab Units 12/02/22  0550 12/01/22  0737   POTASSIUM mmol/L 4 5 4 9   CHLORIDE mmol/L 115* 111*   CO2 mmol/L 11* 20*   BUN mg/dL 53* 57*   CREATININE mg/dL 2 56* 2 81*   CALCIUM mg/dL 8 8 9 2   ALK PHOS U/L  --  75   ALT U/L  --  22   AST U/L  --  27     Results from last 7 days   Lab Units 12/01/22  0737   INR  1 10       * I Have Reviewed All Lab Data Listed Above  * Additional Pertinent Lab Tests Reviewed: All Labs Within Last 24 Hours Reviewed        Recent Cultures (last 7 days):     Results from last 7 days   Lab Units 12/01/22  1225   BLOOD CULTURE  Received in Microbiology Lab  Culture in Progress  Received in Microbiology Lab  Culture in Progress         Last 24 Hours Medication List:   Current Facility-Administered Medications   Medication Dose Route Frequency Provider Last Rate   • acetaminophen  650 mg Oral Q4H PRN Gabriela Smalls MD     • aluminum-magnesium hydroxide-simethicone  30 mL Oral Q6H PRN Gabriela Smalls MD     • cefTRIAXone  1,000 mg Intravenous Q24H Gabriela Smalls MD Stopped (12/01/22 5283)   • clopidogrel  75 mg Oral Daily Gabriela Smalls MD     • heparin (porcine) 5,000 Units Subcutaneous [facility] Breana Delaney MD     • HYDROmorphone  0 2 mg Intravenous Q4H PRN Breana Delaney MD     • insulin detemir  12 Units Subcutaneous HS Breana Delaney MD     • insulin lispro  1-5 Units Subcutaneous TID [facility] Breana Delaney MD     • insulin lispro  1-5 Units Subcutaneous HS Breana Delaney MD     • lidocaine  1 application Urethral Daily PRN Breana Delaney MD     • ondansetron  4 mg Intravenous Q6H PRN Breana Delaney MD     • oxybutynin  5 mg Oral BID PRN Breana Delaney MD     • oxyCODONE  2 5 mg Oral Q6H PRN Breana Delaney MD     • pregabalin  75 mg Oral BID Breana Delaney MD     • simethicone  80 mg Oral 4x Daily PRN Breana Delaney MD     • sodium bicarbonate infusion  100 mL/hr Intravenous Continuous Izabella Fregoso DO     • tacrolimus  1 mg Oral BID Breana Delaney MD     • tamsulosin  0 4 mg Oral Daily With Aminta Garcia MD     • temazepam  15 mg Oral HS PRN Breana Delaney MD          Today, Patient Was Seen By: Colby Major DO    ** Please Note: Dictation voice to text software may have been used in the creation of this document   **

## 2022-12-02 NOTE — UTILIZATION REVIEW
Initial Clinical Review    OBS 12/1 @ 1117 UPGRADED TO INPATIENT 12/2 @ [address]    12/02/22 1712  Inpatient Admission  Once        Transfer Service: Hospitalist       Question Answer Comment   Level of Care Med Surg        12/02/22 1711     ED Arrival Information     Expected   12/1/2022     Arrival   12/1/2022 06:50    Acuity   Urgent            Means of arrival   Ambulance    Escorted by   LORETTA Billy)    Service   Hospitalist    Admission type   Emergency            Arrival complaint   Fatigue           Chief Complaint   Patient presents with   • Weakness - Generalized     Per EMS pt recently diagnosed with "infection," pt not taking meds prescribed to him  Pt feeling weak and not eating  Initial Presentation: 74 y o  male with PMHx of invasive bladder tumor s/p resection right-sided hydronephrosis managed with percutaneous nephrostomy tube, T2 DM, HTN, CKD presents to ED by ems with penile pain and bladder spasm  Pt had cystoscopy with dilatation of the urethra and placement of Webb catheter yesterday by Urology  Presented to with c/o pain d/t Webb catheter  BUN 52, Cr 2 42  UA (+) UTI  Admit under observation to M/S unit with Bladder spasms, suspected UTI -- continue with oxybutynin  Consult Urology  Continue IV ceftriaxone  F/u urine and blood cxs  Urology consult --   urine culture sent from office yesterday, currently pending, treat empirically and adjust antibiotics based upon culture data  -penile pain most likely spasm, plan was for urethral Webb catheter removal in the office tomorrow    Can provide oxybutynin p r n  and urethral lidocaine for discomfort   -urethral Webb catheter currently in place draining clear yellow urine   -continue with medical optimization per primary team, patient is currently hemodynamically stable, afebrile, no leukocytosis and creatinine at baseline   -can remove urethral Webb catheter tomorrow a m  as planned was removal in office  -PCNU in place capped, creatinine at baseline  If patient begins to experience fevers chills would recommend opening PCNU to drainage  Can leave capped at this time  Date: 12/2  UPGRADE TO INPATIENT -- bicarb gtt for metabolic acidosis  Continue IV abx  F/u urine and blood cxs  Labs in AM  Supportive care      ED Triage Vitals   Temperature Pulse Respirations Blood Pressure SpO2   12/01/22 0704 12/01/22 0704 12/01/22 0704 12/01/22 0704 12/01/22 0704   (!) 97 2 °F (36 2 °C) 66 18 121/66 96 %      Temp Source Heart Rate Source Patient Position - Orthostatic VS BP Location FiO2 (%)   12/01/22 0704 12/01/22 0845 12/01/22 0845 12/01/22 0704 --   Tympanic Monitor Sitting Right arm       Pain Score       12/01/22 0704       10 - Worst Possible Pain          Wt Readings from Last 1 Encounters:   12/01/22 54 4 kg (120 lb)     Additional Vital Signs:  Date/Time Temp Pulse Resp BP MAP (mmHg) SpO2 O2 Device Patient Position - Orthostatic VS   12/02/22 07:58:54 97 7 °F (36 5 °C) 80 -- 122/65 84 99 % -- --   12/01/22 2300 -- -- -- -- -- -- -- --   Comment rows:   OBSERV: ' at 12/01/22 2300 12/01/22 2237 -- -- -- -- -- -- None (Room air) --   12/01/22 22:19:58 98 1 °F (36 7 °C) 86 -- 129/63 85 99 % -- --   12/01/22 1749 -- -- -- -- -- -- None (Room air) --   12/01/22 17:39:33 98 1 °F (36 7 °C) 85 -- 148/82 104 100 % -- --   12/01/22 1345 -- 80 18 136/75 99 100 % None (Room air) --   12/01/22 1145 -- 80 16 99/55 74 99 % None (Room air) Lying   12/01/22 1045 -- 84 16 125/73 92 100 % None (Room air) Lying   12/01/22 0845 -- 84 18 121/66 86 100 % None (Room air) Sitting   12/01/22 0704 97 2 °F (36 2 °C) Abnormal  66 18 121/66 -- 96 % None (Room air) --     Pertinent Labs/Diagnostic Test Results:   Results from last 7 days   Lab Units 12/01/22  0737 11/29/22  1344   WBC Thousand/uL 8 25 7 79   HEMOGLOBIN g/dL 10 4* 9 6*   HEMATOCRIT % 33 8* 31 4*   PLATELETS Thousands/uL 113* 108*   NEUTROS ABS Thousands/µL 6 38 5 77 Results from last 7 days   Lab Units 12/01/22  0737 11/29/22  1344   SODIUM mmol/L 137 138   POTASSIUM mmol/L 4 9 4 1   CHLORIDE mmol/L 111* 108   CO2 mmol/L 20* 23   ANION GAP mmol/L 6 7   BUN mg/dL 57* 52*   CREATININE mg/dL 2 81* 2 42*   EGFR ml/min/1 73sq m 21 25   CALCIUM mg/dL 9 2 8 9     Results from last 7 days   Lab Units 12/01/22  0737 11/29/22  1344   AST U/L 27 18   ALT U/L 22 19   ALK PHOS U/L 75 76   TOTAL PROTEIN g/dL 8 4 7 9   ALBUMIN g/dL 2 9* 3 1*   TOTAL BILIRUBIN mg/dL 0 35 0 24     Results from last 7 days   Lab Units 12/02/22  0758 12/01/22  2116 12/01/22  1640 12/01/22  1317   POC GLUCOSE mg/dl 77 203* 174* 210*     Results from last 7 days   Lab Units 12/01/22  0737 11/29/22  1344   GLUCOSE RANDOM mg/dL 119 194*     Results from last 7 days   Lab Units 12/01/22  0737   PROTIME seconds 14 4   INR  1 10   PTT seconds 28     Results from last 7 days   Lab Units 12/01/22  1225   BLOOD CULTURE  Received in Microbiology Lab  Culture in Progress  Received in Microbiology Lab  Culture in Progress         ED Treatment:   Medication Administration from 12/01/2022 0650 to 12/01/2022 1737       Date/Time Order Dose Route Action     12/01/2022 0741 EST fentanyl citrate (PF) 100 MCG/2ML 50 mcg 50 mcg Intravenous Given     12/01/2022 1051 EST fentanyl citrate (PF) 100 MCG/2ML 50 mcg 50 mcg Intravenous Given     12/01/2022 1221 EST oxybutynin (DITROPAN) tablet 5 mg 5 mg Oral Given     12/01/2022 1052 EST lidocaine (URO-JET) 2 % urethral/mucosal gel 1 application 1 application Urethral Given     12/01/2022 1328 EST clopidogrel (PLAVIX) tablet 75 mg 75 mg Oral Given     12/01/2022 1328 EST pregabalin (LYRICA) capsule 75 mg 75 mg Oral Given     12/01/2022 1328 EST tacrolimus (PROGRAF) capsule 1 mg 1 mg Oral Given     12/01/2022 1329 EST insulin lispro (HumaLOG) 100 units/mL subcutaneous injection 1-5 Units 1 Units Subcutaneous Given     12/01/2022 1329 EST heparin (porcine) subcutaneous injection 5,000 Units 5,000 Units Subcutaneous Given     12/01/2022 1329 EST ceftriaxone (ROCEPHIN) 1 g/50 mL in dextrose IVPB 1,000 mg Intravenous New Bag     12/01/2022 1451 EST oxyCODONE (ROXICODONE) IR tablet 2 5 mg 2 5 mg Oral Given     Past Medical History:   Diagnosis Date   • Alcoholism ([place] [address] )    • Anemia    • Bladder cancer ([place] [address] )    • Cerebral artery aneurysm    • Change in mental state     last assessed 5/18/15; resolved 10/27/15   • Diabetes mellitus ([place] [address] )    • Drug dependence ([place] [address] )    • Fatigue     last assessed 1/26/15; resolved 5/24/16   • Hepatitis C    • Hospital discharge follow-up 12/21/2018   • Hx of liver transplant ([place] [address] )    • Hydronephrosis of right kidney    • Kidney disease    • 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HCC)    • Liver cirrhosis (HCC)    • Liver transplant recipient [facility])    • Liver transplant status ([place] [address] )    • Renal disorder    • Stroke (cerebrum) ([place] [address] )    • Stroke [facility])    • Subdural hygroma     2/27/14; resolved 7/28/15   • Thrombocytopenia (HCC)    • Thrombocytopenia ([place] [address] ) 9/20/2017     Present on Admission:  • Stage 4 chronic kidney disease ([place] [address] )  • Anemia due to chronic kidney disease  • Dementia (HCC)  • Malignant neoplasm of trigone of urinary bladder (HCC)  • Hydronephrosis  • Anemia  • Thrombocytopenia (HCC)      Admitting Diagnosis: Fatigue [R53 83]  Post-operative pain [G89 18]  Generalized weakness [R53 1]  Age/Sex: 74 y o  male  Admission Orders:  Scheduled Medications:  cefTRIAXone, 1,000 mg, Intravenous, Q24H  clopidogrel, 75 mg, Oral, Daily  heparin (porcine), 5,000 Units, Subcutaneous, Q8H JUNIOR  insulin detemir, 12 Units, Subcutaneous, HS  insulin lispro, 1-5 Units, Subcutaneous, TID AC  insulin lispro, 1-5 Units, Subcutaneous, HS  pregabalin, 75 mg, Oral, BID  tacrolimus, 1 mg, Oral, BID  tamsulosin, 0 4 mg, Oral, Daily With Dinner    PRN Meds:  acetaminophen, 650 mg, Oral, Q4H PRN  aluminum-magnesium hydroxide-simethicone, 30 mL, Oral, Q6H PRN  HYDROmorphone, 0 2 mg, Intravenous, Q4H PRN 12/1 x1, 12/2 x1  lidocaine, 1 application, Urethral, Daily PRN  ondansetron, 4 mg, Intravenous, Q6H PRN  oxybutynin, 5 mg, Oral, BID PRN 12/1 x1  oxyCODONE, 2 5 mg, Oral, Q6H PRN 12/1 x2  simethicone, 80 mg, Oral, 4x Daily PRN  temazepam, 15 mg, Oral, HS PRN        Network Utilization Review Department  ATTENTION: Please call with any questions or concerns to [phone] and carefully listen to the prompts so that you are directed to the right person  All voicemails are confidential   Fidencio Padgett all requests for admission clinical reviews, approved or denied determinations and any other requests to dedicated fax number below belonging to the campus where the patient is receiving treatment   List of dedicated fax numbers for the Facilities:  [address] DENIALS (Administrative/Medical Necessity) [phone]   [address] (Maternity/NICU/Pediatrics) [phone]   8 Laura Ramirez [phone]   [place] [address] [phone]   [address] [place] [address] [facility] [phone]   [address] [facility] 134 8[address] [facility] [phone]

## 2022-12-02 NOTE — PLAN OF CARE
Problem: PAIN - ADULT  Goal: Verbalizes/displays adequate comfort level or baseline comfort level  Description: Interventions:  - Encourage patient to monitor pain and request assistance  - Assess pain using appropriate pain scale  - Administer analgesics based on type and severity of pain and evaluate response  - Implement non-pharmacological measures as appropriate and evaluate response  - Consider cultural and social influences on pain and pain management  - Notify physician/advanced practitioner if interventions unsuccessful or patient reports new pain  Outcome: Progressing     Problem: INFECTION - ADULT  Goal: Absence or prevention of progression during hospitalization  Description: INTERVENTIONS:  - Assess and monitor for signs and symptoms of infection  - Monitor lab/diagnostic results  - Monitor all insertion sites, i e  indwelling lines, tubes, and drains  - Monitor endotracheal if appropriate and nasal secretions for changes in amount and color  - [place] appropriate cooling/warming therapies per order  - Administer medications as ordered  - Instruct and encourage patient and family to use good hand hygiene technique  - Identify and instruct in appropriate isolation precautions for identified infection/condition  Outcome: Progressing  Goal: Absence of fever/infection during neutropenic period  Description: INTERVENTIONS:  - Monitor WBC    Outcome: Progressing     Problem: SAFETY ADULT  Goal: Patient will remain free of falls  Description: INTERVENTIONS:  - Educate patient/family on patient safety including physical limitations  - Instruct patient to call for assistance with activity   - Consult OT/PT to assist with strengthening/mobility   - Keep Call bell within reach  - Keep bed low and locked with side rails adjusted as appropriate  - Keep care items and personal belongings within reach  - Initiate and maintain comfort rounds  - Make Fall Risk Sign visible to staff  - Offer Toileting every  Hours, in advance of need  - Initiate/Maintain alarm  - Obtain necessary fall risk management equipment:   - Apply yellow socks and bracelet for high fall risk patients  - Consider moving patient to room near nurses station  Outcome: Progressing  Goal: Maintain or return to baseline ADL function  Description: INTERVENTIONS:  -  Assess patient's ability to carry out ADLs; assess patient's baseline for ADL function and identify physical deficits which impact ability to perform ADLs (bathing, care of mouth/teeth, toileting, grooming, dressing, etc )  - Assess/evaluate cause of self-care deficits   - Assess range of motion  - Assess patient's mobility; develop plan if impaired  - Assess patient's need for assistive devices and provide as appropriate  - Encourage maximum independence but intervene and supervise when necessary  - Involve family in performance of ADLs  - Assess for home care needs following discharge   - Consider OT consult to assist with ADL evaluation and planning for discharge  - Provide patient education as appropriate  Outcome: Progressing  Goal: Maintains/Returns to pre admission functional level  Description: INTERVENTIONS:  - Perform BMAT or MOVE assessment daily    - Set and communicate daily mobility goal to care team and patient/family/caregiver  - Collaborate with rehabilitation services on mobility goals if consulted  - Perform Range of Motion  times a day  - Reposition patient every  hours    - Dangle patient  times a day  - Stand patient  times a day  - Ambulate patient  times a day  - Out of bed to chair  times a day   - Out of bed for leon times a day  - Out of bed for toileting  - Record patient progress and toleration of activity level   Outcome: Progressing     Problem: DISCHARGE PLANNING  Goal: Discharge to home or other facility with appropriate resources  Description: INTERVENTIONS:  - Identify barriers to discharge w/patient and caregiver  - Arrange for needed discharge resources and transportation as appropriate  - Identify discharge learning needs (meds, wound care, etc )  - Arrange for interpretive services to assist at discharge as needed  - Refer to Case Management Department for coordinating discharge planning if the patient needs post-hospital services based on physician/advanced practitioner order or complex needs related to functional status, cognitive ability, or social support system  Outcome: Progressing     Problem: Knowledge Deficit  Goal: Patient/family/caregiver demonstrates understanding of disease process, treatment plan, medications, and discharge instructions  Description: Complete learning assessment and assess knowledge base    Interventions:  - Provide teaching at level of understanding  - Provide teaching via preferred learning methods  Outcome: Progressing

## 2022-12-03 ENCOUNTER — APPOINTMENT (INPATIENT)
Dept: RADIOLOGY | Facility: HOSPITAL | Age: 72
End: 2022-12-03
Attending: RADIOLOGY

## 2022-12-03 LAB
ANION GAP SERPL CALCULATED.3IONS-SCNC: 6 MMOL/L (ref 4–13)
BUN SERPL-MCNC: 45 MG/DL (ref 5–25)
CALCIUM SERPL-MCNC: 8.2 MG/DL (ref 8.3–10.1)
CHLORIDE SERPL-SCNC: 107 MMOL/L (ref 96–108)
CO2 SERPL-SCNC: 27 MMOL/L (ref 21–32)
CREAT SERPL-MCNC: 2.4 MG/DL (ref 0.6–1.3)
GFR SERPL CREATININE-BSD FRML MDRD: 25 ML/MIN/1.73SQ M
GLUCOSE SERPL-MCNC: 108 MG/DL (ref 65–140)
GLUCOSE SERPL-MCNC: 128 MG/DL (ref 65–140)
GLUCOSE SERPL-MCNC: 300 MG/DL (ref 65–140)
GLUCOSE SERPL-MCNC: 82 MG/DL (ref 65–140)
GLUCOSE SERPL-MCNC: 89 MG/DL (ref 65–140)
POTASSIUM SERPL-SCNC: 3.6 MMOL/L (ref 3.5–5.3)
SODIUM SERPL-SCNC: 140 MMOL/L (ref 135–147)

## 2022-12-03 PROCEDURE — 0T25X0Z CHANGE DRAINAGE DEVICE IN KIDNEY, EXTERNAL APPROACH: ICD-10-PCS | Performed by: RADIOLOGY

## 2022-12-03 PROCEDURE — BT111ZZ FLUOROSCOPY OF RIGHT KIDNEY USING LOW OSMOLAR CONTRAST: ICD-10-PCS | Performed by: RADIOLOGY

## 2022-12-03 RX ORDER — LANOLIN ALCOHOL/MO/W.PET/CERES
3 CREAM (GRAM) TOPICAL
Status: DISCONTINUED | OUTPATIENT
Start: 2022-12-03 | End: 2022-12-04 | Stop reason: HOSPADM

## 2022-12-03 RX ADMIN — INSULIN LISPRO 3 UNITS: 100 INJECTION, SOLUTION INTRAVENOUS; SUBCUTANEOUS at 17:25

## 2022-12-03 RX ADMIN — HEPARIN SODIUM 5000 UNITS: 5000 INJECTION INTRAVENOUS; SUBCUTANEOUS at 13:25

## 2022-12-03 RX ADMIN — INSULIN DETEMIR 12 UNITS: 100 INJECTION, SOLUTION SUBCUTANEOUS at 21:32

## 2022-12-03 RX ADMIN — PREGABALIN 75 MG: 75 CAPSULE ORAL at 17:25

## 2022-12-03 RX ADMIN — IOHEXOL 10 ML: 350 INJECTION, SOLUTION INTRAVENOUS at 10:43

## 2022-12-03 RX ADMIN — CLOPIDOGREL BISULFATE 75 MG: 75 TABLET ORAL at 09:34

## 2022-12-03 RX ADMIN — TEMAZEPAM 15 MG: 15 CAPSULE ORAL at 21:34

## 2022-12-03 RX ADMIN — TACROLIMUS 1 MG: 1 CAPSULE ORAL at 09:34

## 2022-12-03 RX ADMIN — PREGABALIN 75 MG: 75 CAPSULE ORAL at 09:34

## 2022-12-03 RX ADMIN — HEPARIN SODIUM 5000 UNITS: 5000 INJECTION INTRAVENOUS; SUBCUTANEOUS at 05:07

## 2022-12-03 RX ADMIN — OXYCODONE HYDROCHLORIDE 2.5 MG: 5 TABLET ORAL at 09:34

## 2022-12-03 RX ADMIN — TAMSULOSIN HYDROCHLORIDE 0.4 MG: 0.4 CAPSULE ORAL at 17:25

## 2022-12-03 RX ADMIN — SODIUM BICARBONATE 100 ML/HR: 84 INJECTION, SOLUTION INTRAVENOUS at 02:42

## 2022-12-03 RX ADMIN — HEPARIN SODIUM 5000 UNITS: 5000 INJECTION INTRAVENOUS; SUBCUTANEOUS at 21:33

## 2022-12-03 RX ADMIN — TACROLIMUS 1 MG: 1 CAPSULE ORAL at 21:33

## 2022-12-03 RX ADMIN — CEFTRIAXONE SODIUM 1000 MG: 10 INJECTION, POWDER, FOR SOLUTION INTRAVENOUS at 13:25

## 2022-12-03 RX ADMIN — HYDROMORPHONE HYDROCHLORIDE 0.2 MG: 0.2 INJECTION, SOLUTION INTRAMUSCULAR; INTRAVENOUS; SUBCUTANEOUS at 03:15

## 2022-12-03 NOTE — PLAN OF CARE
Problem: PAIN - ADULT  Goal: Verbalizes/displays adequate comfort level or baseline comfort level  Description: Interventions:  - Encourage patient to monitor pain and request assistance  - Assess pain using appropriate pain scale  - Administer analgesics based on type and severity of pain and evaluate response  - Implement non-pharmacological measures as appropriate and evaluate response  - Consider cultural and social influences on pain and pain management  - Notify physician/advanced practitioner if interventions unsuccessful or patient reports new pain  Outcome: Progressing     Problem: INFECTION - ADULT  Goal: Absence or prevention of progression during hospitalization  Description: INTERVENTIONS:  - Assess and monitor for signs and symptoms of infection  - Monitor lab/diagnostic results  - Monitor all insertion sites, i e  indwelling lines, tubes, and drains  - Monitor endotracheal if appropriate and nasal secretions for changes in amount and color  - [place] appropriate cooling/warming therapies per order  - Administer medications as ordered  - Instruct and encourage patient and family to use good hand hygiene technique  - Identify and instruct in appropriate isolation precautions for identified infection/condition  Outcome: Progressing  Goal: Absence of fever/infection during neutropenic period  Description: INTERVENTIONS:  - Monitor WBC    Outcome: Progressing     Problem: SAFETY ADULT  Goal: Patient will remain free of falls  Description: INTERVENTIONS:  - Educate patient/family on patient safety including physical limitations  - Instruct patient to call for assistance with activity   - Consult OT/PT to assist with strengthening/mobility   - Keep Call bell within reach  - Keep bed low and locked with side rails adjusted as appropriate  - Keep care items and personal belongings within reach  - Initiate and maintain comfort rounds  - Make Fall Risk Sign visible to staff  - Offer Toileting every 2 Hours, in advance of need  - Initiate/Maintain bed alarm  - Obtain necessary fall risk management equipment  - Apply yellow socks and bracelet for high fall risk patients  - Consider moving patient to room near nurses station  Outcome: Progressing  Goal: Maintain or return to baseline ADL function  Description: INTERVENTIONS:  -  Assess patient's ability to carry out ADLs; assess patient's baseline for ADL function and identify physical deficits which impact ability to perform ADLs (bathing, care of mouth/teeth, toileting, grooming, dressing, etc )  - Assess/evaluate cause of self-care deficits   - Assess range of motion  - Assess patient's mobility; develop plan if impaired  - Assess patient's need for assistive devices and provide as appropriate  - Encourage maximum independence but intervene and supervise when necessary  - Involve family in performance of ADLs  - Assess for home care needs following discharge   - Consider OT consult to assist with ADL evaluation and planning for discharge  - Provide patient education as appropriate  Outcome: Progressing  Goal: Maintains/Returns to pre admission functional level  Description: INTERVENTIONS:  - Perform BMAT or MOVE assessment daily    - Set and communicate daily mobility goal to care team and patient/family/caregiver     - Collaborate with rehabilitation services on mobility goals if consulted  - Ambulate patient 3 times a day  - Out of bed to chair 3 times a day   - Out of bed for meals  times a day  - Out of bed for toileting  - Record patient progress and toleration of activity level   Outcome: Progressing     Problem: DISCHARGE PLANNING  Goal: Discharge to home or other facility with appropriate resources  Description: INTERVENTIONS:  - Identify barriers to discharge w/patient and caregiver  - Arrange for needed discharge resources and transportation as appropriate  - Identify discharge learning needs (meds, wound care, etc )  - Arrange for interpretive services to assist at discharge as needed  - Refer to Case Management Department for coordinating discharge planning if the patient needs post-hospital services based on physician/advanced practitioner order or complex needs related to functional status, cognitive ability, or social support system  Outcome: Progressing     Problem: Knowledge Deficit  Goal: Patient/family/caregiver demonstrates understanding of disease process, treatment plan, medications, and discharge instructions  Description: Complete learning assessment and assess knowledge base    Interventions:  - Provide teaching at level of understanding  - Provide teaching via preferred learning methods  Outcome: Progressing     Problem: Potential for Falls  Goal: Patient will remain free of falls  Description: INTERVENTIONS:  - Educate patient/family on patient safety including physical limitations  - Instruct patient to call for assistance with activity   - Consult OT/PT to assist with strengthening/mobility   - Keep Call bell within reach  - Keep bed low and locked with side rails adjusted as appropriate  - Keep care items and personal belongings within reach  - Initiate and maintain comfort rounds  - Make Fall Risk Sign visible to staff  - Offer Toileting every 2 Hours, in advance of need  - Initiate/Maintain bed alarm  - Obtain necessary fall risk management equipment  - Apply yellow socks and bracelet for high fall risk patients  - Consider moving patient to room near nurses station  Outcome: Progressing

## 2022-12-03 NOTE — UTILIZATION REVIEW
Continued Stay Review    Date: 12/3/22   Day 2                      Current Patient Class: inpatient  Current Level of Care: med surg    HPI:72 y o  male initially admitted on 12/2/22 Bladder spasms     Assessment/Plan:  Abdomen flat and soft, pt offers no new complaints today  Continue with oxybutynin and pain control  Pt with noted crack in nephrostomy tube, IR consulted  Bicarb drip was discontinued  Continue IV ABX and fu on urine cxs  Urology following  Monitor H&H and Cr  Thrombocytopenia at baseline, continue to monitor  Check Prograf level dt liver transplant  Continue accuchecks w/ SSI     12/3 IR Consult:  Right PCN with broken catheter    Exchange is appropriate and will be arranged by the IR team     Vital Signs:   Date/Time Temp Pulse Resp BP MAP (mmHg) SpO2 O2 Device Patient Position - Orthostatic VS   12/03/22 1000 -- 75 20 119/62 -- 99 % None (Room air) --   12/03/22 08:18:34 97 3 °F (36 3 °C) Abnormal  74 18 104/53 70 98 % -- --   12/02/22 21:36:19 97 9 °F (36 6 °C) 69 16 134/75 95 98 % -- --   12/02/22 2100 -- -- -- -- -- 99 % None (Room air) --   12/02/22 15:23:40 97 9 °F (36 6 °C) 82 -- 119/66 84 99 % -- --   12/02/22 0915 -- -- -- -- -- -- None (Room air) --   12/02/22 07:58:54 97 7 °F (36 5 °C) 80 -- 122/65 84 99 % -- --   12/01/22 2300 -- -- -- -- -- -- -- --   Comment rows:   OBSERV: ' at 12/01/22 2300   12/01/22 2237 -- -- -- -- -- -- None (Room air) --   12/01/22 22:19:58 98 1 °F (36 7 °C) 86 -- 129/63 85 99 % -- --   12/01/22 1749 -- -- -- -- -- -- None (Room air) --   12/01/22 17:39:33 98 1 °F (36 7 °C) 85 -- 148/82 104 100 % -- --   12/01/22 1345 -- 80 18 136/75 99 100 % None (Room air) --   12/01/22 1145 -- 80 16 99/55 74 99 % None (Room air) Lying   12/01/22 1045 -- 84 16 125/73 92 100 % None (Room air) Lying   12/01/22 0845 -- 84 18 121/66 86 100 % None (Room air) Sitting   12/01/22 0704 97 2 °F (36 2 °C) Abnormal  66 18 121/66 -- 96 % None (Room air) --     Pertinent Labs/Diagnostic Results:       Results from last 7 days   Lab Units 12/02/22  1004 12/01/22  0737 11/29/22  1344   WBC Thousand/uL 5 57 8 25 7 79   HEMOGLOBIN g/dL 11 3* 10 4* 9 6*   HEMATOCRIT % 36 8 33 8* 31 4*   PLATELETS Thousands/uL 98* 113* 108*   NEUTROS ABS Thousands/µL 3 50 6 38 5 77         Results from last 7 days   Lab Units 12/03/22  0506 12/02/22  1840 12/02/22  1615 12/02/22  0550 12/01/22  0737   SODIUM mmol/L 140 135 133* 138 137   POTASSIUM mmol/L 3 6 3 9 3 8 4 5 4 9   CHLORIDE mmol/L 107 108 96 115* 111*   CO2 mmol/L 27 23 33* 11* 20*   ANION GAP mmol/L 6 4 4 12 6   BUN mg/dL 45* 51* 46* 53* 57*   CREATININE mg/dL 2 40* 2 46* 2 34* 2 56* 2 81*   EGFR ml/min/1 73sq m 25 24 26 23 21   CALCIUM mg/dL 8 2* 8 3 7 5* 8 8 9 2     Results from last 7 days   Lab Units 12/01/22  0737 11/29/22  1344   AST U/L 27 18   ALT U/L 22 19   ALK PHOS U/L 75 76   TOTAL PROTEIN g/dL 8 4 7 9   ALBUMIN g/dL 2 9* 3 1*   TOTAL BILIRUBIN mg/dL 0 35 0 24     Results from last 7 days   Lab Units 12/03/22  1153 12/03/22  0729 12/02/22  2107 12/02/22  1617 12/02/22  1158 12/02/22  0758 12/01/22  2116 12/01/22  1640 12/01/22  1317   POC GLUCOSE mg/dl 108 82 191* 195* 106 77 203* 174* 210*     Results from last 7 days   Lab Units 12/03/22  0506 12/02/22  1840 12/02/22  1615 12/02/22  0550 12/01/22  0737 11/29/22  1344   GLUCOSE RANDOM mg/dL 89 213* 660* 71 119 194*     Results from last 7 days   Lab Units 12/01/22  0737   PROTIME seconds 14 4   INR  1 10   PTT seconds 28     Results from last 7 days   Lab Units 12/01/22  1225 11/30/22  1419   BLOOD CULTURE  No Growth at 24 hrs    No Growth at 24 hrs   --    URINE CULTURE   --  >100,000 cfu/ml Escherichia coli*     Medications:   Scheduled Medications:  cefTRIAXone, 1,000 mg, Intravenous, Q24H  clopidogrel, 75 mg, Oral, Daily  heparin (porcine), 5,000 Units, Subcutaneous, Q8H JUNIOR  insulin detemir, 12 Units, Subcutaneous, HS  insulin lispro, 1-5 Units, Subcutaneous, TID AC  insulin lispro, 1-5 Units, Subcutaneous, HS  pregabalin, 75 mg, Oral, BID  tacrolimus, 1 mg, Oral, BID  tamsulosin, 0 4 mg, Oral, Daily With Dinner      Continuous IV Infusions:  sodium bicarbonate infusion, 100 mL/hr, Intravenous, Continuous      PRN Meds:  acetaminophen, 650 mg, Oral, Q4H PRN  aluminum-magnesium hydroxide-simethicone, 30 mL, Oral, Q6H PRN  HYDROmorphone, 0 2 mg, Intravenous, Q4H PRN  lidocaine, 1 application, Urethral, Daily PRN  ondansetron, 4 mg, Intravenous, Q6H PRN  oxybutynin, 5 mg, Oral, BID PRN  oxyCODONE, 2 5 mg, Oral, Q6H PRN  simethicone, 80 mg, Oral, 4x Daily PRN  temazepam, 15 mg, Oral, HS PRN        Discharge Plan: d    Network Utilization Review Department  ATTENTION: Please call with any questions or concerns to [phone] and carefully listen to the prompts so that you are directed to the right person  All voicemails are confidential   [facility] all requests for admission clinical reviews, approved or denied determinations and any other requests to dedicated fax number below belonging to the campus where the patient is receiving treatment   List of dedicated fax numbers for the Facilities:  [address] DENIALS (Administrative/Medical Necessity) [phone]   [address] (Maternity/NICU/Pediatrics) [phone]   [address] Laura Ramirez [phone]   [facility] [phone]   [address] [address] [facility] [phone]   [address] [place] [facility] [place] 134 815 [facility] [phone]

## 2022-12-03 NOTE — DISCHARGE SUMMARY
[address] [facility]  Discharge- Sam Card 1950, 74 y o  male MRN: [id number]  Unit/Bed#: [facility] 922-01 Encounter: [id number]  Primary Care Provider: Sravanthi Abarca DO   Date and time admitted to hospital: 12/1/2022  6:50 AM    * Bladder spasms  Assessment & Plan  · Patient had cystoscopy with dilatation of the urethra and placement of Webb catheter yesterday by Urology  Patient came to the hospital with pain due to the Webb catheter  · [place] to be secondary to bladder spasm  · Continue with oxybutynin  · Pain control  · Consult urology  · Patient with noted crack in nephrostomy tube  IR consult    Metabolic acidosis  Assessment & Plan  Bicarbonate drip-discontinue    Suspected UTI  Assessment & Plan  · Patient had cystoscopy with dilatation of the urethra and Webb catheter placement yesterday  Noted to have cloudy urine and started on antibiotics  · Patient was unable to take the antibiotics at home due to pain  · IV ceftriaxone  · Follow-up on the cultures and transition to p o  Antibiotics  · Follow-up on the urine culture and blood culture    [facility])  Assessment & Plan  · Monitor mental status  · Avoid sedating medication    Hydronephrosis  Assessment & Plan  · Patient had right-sided percutaneous nephrostomy tube  · Night team noted crack in nephrostomy tube with leakage of urine  · Message interventional radiology for evaluation  · Urology aware patient    Stage 4 chronic kidney disease [facility])  Assessment & Plan  Lab Results   Component Value Date    EGFR 25 12/03/2022    EGFR 24 12/02/2022    EGFR 26 12/02/2022    CREATININE 2 40 (H) 12/03/2022    CREATININE 2 46 (H) 12/02/2022    CREATININE 2 34 (H) 12/02/2022   Creatinine 2 81  Closer to baseline  Monitor creatinine    Follow-up with nephrology as outpatient    Malignant neoplasm of trigone of urinary bladder [facility])  Assessment & Plan  · Known history of bladder tumor status post resection and radiation  · Follow-up with Urology  · Patient with right-sided nephrostomy tube-urology is considering internalization   · Outpatient follow-up with Urology    Thrombocytopenia [facility])  Assessment & Plan  · Monitor for now  · At baseline    Liver transplant status [facility])  Assessment & Plan  · Continue with Prograf  · Check Prograf level    Controlled diabetes mellitus with diabetic neuropathy, with long-term current use of insulin [facility])  Assessment & Plan  Lab Results   Component Value Date    HGBA1C 6 8 (H) 08/19/2022       Recent Labs     12/02/22  1617 12/02/22  2107 12/03/22  0729 12/03/22  1153   POCGLU 195* 191* 82 108       Blood Sugar Average: Last 72 hrs:  (P) 912 [id number]   Patient is on insulin as outpatient  Continue with the Levemir and add sliding scale  Monitor blood sugar  Adjust insulin dose per blood sugar              Medical Problems     Resolved Problems  Date Reviewed: 12/1/2022   None         Admission Date:   Admission Orders (From admission, onward)     Ordered        12/02/22 1711  Inpatient Admission  Once            12/01/22 1117  Place in Observation  Once                        Admitting Diagnosis: Fatigue [R53 83]  Post-operative pain [G89 18]  Generalized weakness [R53 1]    HPI:  This is a very pleasant 70-year-old male with past medical history of invasive bladder tumor status post resection right-sided hydronephrosis managed with percutaneous nephrostomy tube previously, history of type 2 diabetes hypertension CKD who presents the hospital with symptoms of bladder spasms  Patient had a cystoscopy performed in the office prior to admission with dilation of the urethra with Webb catheter placement  This was subsequently removed  Patient was noted have cloudy urine on admission  He was admitted for observation for bladder spasms  His symptoms have dramatically improved    During his hospital course his right-sided nephrostomy tube was noted to be cracked with leakage of urine     Procedures Performed:  Status post nephrostomy tube replacement    Summary of Hospital Course:  Patient was admitted for further workup evaluation  Patient was seen by interventional radiology had his right nephrostomy tube replaced  Patient appears to be asymptomatic from a urinary tract infection  Urine culture appears to be consistent with bacteriuria colonization  Will discontinue antibiotics  Spasms may be secondary to Webb placement with dilation  Fortunately patient is nontoxic and stable for discharge  Condition at Discharge: fair         Discharge instructions/Information to patient and family:   See after visit summary for information provided to patient and family  Provisions for Follow-Up Care:  See after visit summary for information related to follow-up care and any pertinent home health orders  PCP: Navid Centeno,     Disposition: Home    Planned Readmission: No    Discharge Statement   I spent 35 minutes discharging the patient  This time was spent on the day of discharge  I had direct contact with the patient on the day of discharge  Additional documentation is required if more than 30 minutes were spent on discharge  Discharge Medications:  See after visit summary for reconciled discharge medications provided to patient and family

## 2022-12-03 NOTE — CONSULTS
[facility] Electronic Health Record Consult  IR has been consulted to evaluate the patient, determine the appropriate procedure, and whether or not a procedure can and should be performed regarding the care of Sindy Sage  We were consulted by hospitalist service concerning Sindy Vanessai, and to possibly perform a  if right PCN exchange medically appropriate for the patient  The patient is aware that a specialty consultation is taking place, and agrees to the IR Consult on their behalf  Assessment/Plan:   75 yo male with right PCN with broken catheter  Exchange is appropriate and will be arranged by the IR team    I spent 17 minutes in medical consultative time evaluating the medical record and imaging of Sindy Sage  Thank you for allowing [facility] to participate in the care of Sindy Sage  Please don't hesitate to call or TigerText us with any questions       Dina Cornejo, DO

## 2022-12-03 NOTE — ASSESSMENT & PLAN NOTE
Lab Results   Component Value Date    HGBA1C 6 8 (H) 08/19/2022       Recent Labs     12/02/22  1158 12/02/22  1617 12/02/22  2107 12/03/22  0729   POCGLU 106 195* 191* 82       Blood Sugar Average: Last 72 hrs:  (P) 154 75   Patient is on insulin as outpatient    Continue with the Levemir and add sliding scale  Monitor blood sugar  Adjust insulin dose per blood sugar

## 2022-12-03 NOTE — ASSESSMENT & PLAN NOTE
· Patient had cystoscopy with dilatation of the urethra and placement of Webb catheter yesterday by Urology  Patient came to the hospital with pain due to the Webb catheter  · [place] to be secondary to bladder spasm  · Continue with oxybutynin  · Pain control  · Consult urology  · Patient with noted crack in nephrostomy tube    IR consult

## 2022-12-03 NOTE — PROGRESS NOTES
[facility]  Progress Note - Vicente Paniagua 1950, 74 y o  male MRN: [id number]  Unit/Bed#: [facility] 922-01 Encounter: [id number]  Primary Care Provider: Berhane Vale DO   Date and time admitted to hospital: 12/1/2022  6:50 AM    * Bladder spasms  Assessment & Plan  · Patient had cystoscopy with dilatation of the urethra and placement of Webb catheter yesterday by Urology  Patient came to the hospital with pain due to the Webb catheter  · [place] to be secondary to bladder spasm  · Continue with oxybutynin  · Pain control  · Consult urology  · Patient with noted crack in nephrostomy tube  IR consult    Metabolic acidosis  Assessment & Plan  Bicarbonate drip-discontinue    Suspected UTI  Assessment & Plan  · Patient had cystoscopy with dilatation of the urethra and Webb catheter placement yesterday  Noted to have cloudy urine and started on antibiotics  · Patient was unable to take the antibiotics at home due to pain  · IV ceftriaxone  · Follow-up on the cultures and transition to p o  Antibiotics  · Follow-up on the urine culture and blood culture    [facility])  Assessment & Plan  · Monitor mental status  · Avoid sedating medication    Hydronephrosis  Assessment & Plan  · Patient had right-sided percutaneous nephrostomy tube  · Night team noted crack in nephrostomy tube with leakage of urine  · Message interventional radiology for evaluation  · Urology aware patient    Stage 4 chronic kidney disease [facility])  Assessment & Plan  Lab Results   Component Value Date    EGFR 25 12/03/2022    EGFR 24 12/02/2022    EGFR 26 12/02/2022    CREATININE 2 40 (H) 12/03/2022    CREATININE 2 46 (H) 12/02/2022    CREATININE 2 34 (H) 12/02/2022   Creatinine 2 81  Closer to baseline  Monitor creatinine    Follow-up with nephrology as outpatient    Malignant neoplasm of trigone of urinary bladder [facility])  Assessment & Plan  · Known history of bladder tumor status post resection and radiation  · Follow-up with Urology  · Patient with right-sided nephrostomy tube-urology is considering internalization   · Outpatient follow-up with Urology    Thrombocytopenia [facility])  Assessment & Plan  · Monitor for now  · At baseline    Liver transplant status [facility])  Assessment & Plan  · Continue with Prograf  · Check Prograf level    Controlled diabetes mellitus with diabetic neuropathy, with long-term current use of insulin [facility])  Assessment & Plan  Lab Results   Component Value Date    HGBA1C 6 8 (H) 2022       Recent Labs     22  1617 22  2107 22  0729 22  1153   POCGLU 195* 191* 82 108       Blood Sugar Average: Last 72 hrs:  (P) 138 [id number]   Patient is on insulin as outpatient  Continue with the Levemir and add sliding scale  Monitor blood sugar  Adjust insulin dose per blood sugar        VTE Pharmacologic Prophylaxis:   Pharmacologic: Heparin  Mechanical VTE Prophylaxis in Place: No    Patient Centered Rounds: I have performed bedside rounds with nursing staff today  Time Spent for Care: 15 minutes  More than 50% of total time spent on counseling and coordination of care as described above  Current Length of Stay: 1 day(s)    Current Patient Status: Inpatient       Code Status: Level 1 - Full Code      Subjective:   No acute distress    Objective:     Vitals:   Temp (24hrs), Av 7 °F (36 5 °C), Min:97 3 °F (36 3 °C), Max:97 9 °F (36 6 °C)    Temp:  [97 3 °F (36 3 °C)-97 9 °F (36 6 °C)] 97 3 °F (36 3 °C)  HR:  [69-82] 75  Resp:  [16-20] 20  BP: (104-134)/(53-75) 119/62  SpO2:  [98 %-99 %] 99 %  Body mass index is 21 26 kg/m²  Input and Output Summary (last 24 hours): Intake/Output Summary (Last 24 hours) at 12/3/2022 1236  Last data filed at 12/3/2022 0953  Gross per 24 hour   Intake 1211 67 ml   Output 800 ml   Net 411 67 ml       Physical Exam:     Physical Exam  HENT:      Head: Normocephalic and atraumatic     Cardiovascular:      Rate and Rhythm: Normal rate and regular rhythm  Pulses: Normal pulses  Heart sounds: Normal heart sounds  Pulmonary:      Effort: Pulmonary effort is normal       Breath sounds: Normal breath sounds  Abdominal:      General: Abdomen is flat  There is no distension  Palpations: Abdomen is soft  There is no mass  Musculoskeletal:         General: No swelling  Skin:     General: Skin is warm and dry  Neurological:      General: No focal deficit present  Mental Status: He is alert  Psychiatric:         Mood and Affect: Mood normal          Additional Data:     Labs:    Results from last 7 days   Lab Units 12/02/22  1004   WBC Thousand/uL 5 57   HEMOGLOBIN g/dL 11 3*   HEMATOCRIT % 36 8   PLATELETS Thousands/uL 98*   NEUTROS PCT % 62   LYMPHS PCT % 19   MONOS PCT % 12   EOS PCT % 6     Results from last 7 days   Lab Units 12/03/22  0506 12/02/22  0550 12/01/22  0737   POTASSIUM mmol/L 3 6   < > 4 9   CHLORIDE mmol/L 107   < > 111*   CO2 mmol/L 27   < > 20*   BUN mg/dL 45*   < > 57*   CREATININE mg/dL 2 40*   < > 2 81*   CALCIUM mg/dL 8 2*   < > 9 2   ALK PHOS U/L  --   --  75   ALT U/L  --   --  22   AST U/L  --   --  27    < > = values in this interval not displayed  Results from last 7 days   Lab Units 12/01/22  0737   INR  1 10       * I Have Reviewed All Lab Data Listed Above  * Additional Pertinent Lab Tests Reviewed: All Labs Within Last 24 Hours Reviewed      Recent Cultures (last 7 days):     Results from last 7 days   Lab Units 12/01/22  1225 11/30/22  1419   BLOOD CULTURE  No Growth at 24 hrs    No Growth at 24 hrs   --    URINE CULTURE   --  >100,000 cfu/ml Escherichia coli*       Last 24 Hours Medication List:   Current Facility-Administered Medications   Medication Dose Route Frequency Provider Last Rate   • acetaminophen  650 mg Oral Q4H PRN Breana Delaney MD     • aluminum-magnesium hydroxide-simethicone  30 mL Oral Q6H PRN Breana Delaney MD     • cefTRIAXone  1,000 mg Intravenous Q24H Reggie Loving MD Stopped (12/02/22 1341)   • clopidogrel  75 mg Oral Daily Reggie Loving MD     • heparin (porcine)  5,000 Units Subcutaneous [facility] Reggie Loving MD     • HYDROmorphone  0 2 mg Intravenous Q4H PRN Reggie Loving MD     • insulin detemir  12 Units Subcutaneous HS Reggie Loving MD     • insulin lispro  1-5 Units Subcutaneous TID [facility] Reggie Loving MD     • insulin lispro  1-5 Units Subcutaneous HS Reggie Loving MD     • lidocaine  1 application Urethral Daily PRN Reggie Loving MD     • ondansetron  4 mg Intravenous Q6H PRN Reggie Loving MD     • oxybutynin  5 mg Oral BID PRN Reggie Loving MD     • oxyCODONE  2 5 mg Oral Q6H PRN Reggie Loving MD     • pregabalin  75 mg Oral BID Reggie Loving MD     • simethicone  80 mg Oral 4x Daily PRN Reggie Loving MD     • sodium bicarbonate infusion  100 mL/hr Intravenous Continuous Izabella Fregoso  mL/hr (12/03/22 0242)   • tacrolimus  1 mg Oral BID Reggie Loving MD     • tamsulosin  0 4 mg Oral Daily With Alberto Lomeli MD     • temazepam  15 mg Oral HS PRN Reggie Loving MD          Today, Patient Was Seen By: Candance Dales, DO    ** Please Note: Dictation voice to text software may have been used in the creation of this document   **

## 2022-12-03 NOTE — ASSESSMENT & PLAN NOTE
· Patient had right-sided percutaneous nephrostomy tube  · Night team noted crack in nephrostomy tube with leakage of urine  · Message interventional radiology for evaluation    · Urology aware patient

## 2022-12-03 NOTE — SEDATION DOCUMENTATION
Right PCNU exchanged by Dr Radha Pate  Patient tolerated well, denies pain and discomfort post-procedure  Pt transported back to room and report given to Gerda Corrigan RN

## 2022-12-03 NOTE — ASSESSMENT & PLAN NOTE
· Patient had cystoscopy with dilatation of the urethra and placement of Webb catheter yesterday by Urology  Patient came to the hospital with pain due to the Webb catheter  · [place] to be secondary to bladder spasm  · Continue with oxybutynin  · Pain control  · Consult urology  · Patient with noted crack in nephrostomy tube    IR consult

## 2022-12-03 NOTE — NURSING NOTE
Called to room by patient  He stated the dressing was very uncomfortable so he tore it off  Small crack in tubing and leaking  Sterile gauze applied and clamped with hemostat

## 2022-12-03 NOTE — PROGRESS NOTES
[address] [facility]  Progress Note - Karen Mesa 1950, 74 y o  male MRN: [id number]  Unit[facility] 922-[facility] Encounter: [id number]  Primary Care Provider: Isabel Mata DO   Date and time admitted to hospital: 12/1/2022  6:50 AM    * Bladder spasms  Assessment & Plan  · Patient had cystoscopy with dilatation of the urethra and placement of Webb catheter yesterday by Urology  Patient came to the hospital with pain due to the Webb catheter  · [place] to be secondary to bladder spasm  · Continue with oxybutynin  · Pain control  · Consult urology  · Patient with noted crack in nephrostomy tube  IR consult    Metabolic acidosis  Assessment & Plan  Bicarbonate drip-discontinue    Suspected UTI  Assessment & Plan  · Patient had cystoscopy with dilatation of the urethra and Webb catheter placement yesterday  Noted to have cloudy urine and started on antibiotics  · Patient was unable to take the antibiotics at home due to pain  · IV ceftriaxone  · Follow-up on the cultures and transition to p o  Antibiotics  · Follow-up on the urine culture and blood culture    [facility])  Assessment & Plan  · Monitor mental status  · Avoid sedating medication    Hydronephrosis  Assessment & Plan  · Patient had right-sided percutaneous nephrostomy tube  · Night team noted crack in nephrostomy tube with leakage of urine  · Message interventional radiology for evaluation  · Urology aware patient    Anemia due to chronic kidney disease  Assessment & Plan  · Monitor H&H    Stage 4 chronic kidney disease [facility])  Assessment & Plan  Lab Results   Component Value Date    EGFR 25 12/03/2022    EGFR 24 12/02/2022    EGFR 26 12/02/2022    CREATININE 2 40 (H) 12/03/2022    CREATININE 2 46 (H) 12/02/2022    CREATININE 2 34 (H) 12/02/2022   Creatinine 2 81  Closer to baseline  Monitor creatinine    Follow-up with nephrology as outpatient    Anemia  Assessment & Plan  · Hemoglobin at 10 2  · Monitor    Malignant neoplasm of trigone of urinary bladder (HCC)  Assessment & Plan  · Known history of bladder tumor status post resection and radiation  · Follow-up with Urology  · Patient with right-sided nephrostomy tube-urology is considering internalization   · Outpatient follow-up with Urology    Thrombocytopenia [facility])  Assessment & Plan  · Monitor for now  · At baseline    Liver transplant status ([address] )  Assessment & Plan  · Continue with Prograf  · Check Prograf level    Controlled diabetes mellitus with diabetic neuropathy, with long-term current use of insulin [facility])  Assessment & Plan  Lab Results   Component Value Date    HGBA1C 6 8 (H) 2022       Recent Labs     22  1158 22  1617 22  2107 22  0729   POCGLU 106 195* 191* 82       Blood Sugar Average: Last 72 hrs:  (P) 154 75   Patient is on insulin as outpatient  Continue with the Levemir and add sliding scale  Monitor blood sugar  Adjust insulin dose per blood sugar      V  Mechanical VTE Prophylaxis in Place: No    Patient Centered Rounds: I have performed bedside rounds with nursing staff today  Time Spent for Care: 15 minutes  More than 50% of total time spent on counseling and coordination of care as described above  Current Length of Stay: 1 day(s)    Current Patient Status: Inpatient       Code Status: Level 1 - Full Code      Subjective:   nad    Objective:     Vitals:   Temp (24hrs), Av 7 °F (36 5 °C), Min:97 3 °F (36 3 °C), Max:97 9 °F (36 6 °C)    Temp:  [97 3 °F (36 3 °C)-97 9 °F (36 6 °C)] 97 3 °F (36 3 °C)  HR:  [69-82] 74  Resp:  [16-18] 18  BP: (104-134)/(53-75) 104/53  SpO2:  [98 %-99 %] 98 %  Body mass index is 21 26 kg/m²  Input and Output Summary (last 24 hours):        Intake/Output Summary (Last 24 hours) at 12/3/2022 0901  Last data filed at 12/3/2022 0242  Gross per 24 hour   Intake 1211 67 ml   Output 750 ml   Net 461 67 ml       Physical Exam:     Physical Exam  Constitutional:       Appearance: Normal appearance  HENT:      Head: Normocephalic and atraumatic  Cardiovascular:      Rate and Rhythm: Normal rate and regular rhythm  Pulmonary:      Effort: Pulmonary effort is normal       Breath sounds: Normal breath sounds  Abdominal:      General: Abdomen is flat  Palpations: Abdomen is soft  Neurological:      General: No focal deficit present  Mental Status: He is alert and oriented to person, place, and time  Psychiatric:         Mood and Affect: Mood normal          Behavior: Behavior normal          Additional Data:     Labs:    Results from last 7 days   Lab Units 12/02/22  1004   WBC Thousand/uL 5 57   HEMOGLOBIN g/dL 11 3*   HEMATOCRIT % 36 8   PLATELETS Thousands/uL 98*   NEUTROS PCT % 62   LYMPHS PCT % 19   MONOS PCT % 12   EOS PCT % 6     Results from last 7 days   Lab Units 12/03/22  0506 12/02/22  0550 12/01/22  0737   POTASSIUM mmol/L 3 6   < > 4 9   CHLORIDE mmol/L 107   < > 111*   CO2 mmol/L 27   < > 20*   BUN mg/dL 45*   < > 57*   CREATININE mg/dL 2 40*   < > 2 81*   CALCIUM mg/dL 8 2*   < > 9 2   ALK PHOS U/L  --   --  75   ALT U/L  --   --  22   AST U/L  --   --  27    < > = values in this interval not displayed  Results from last 7 days   Lab Units 12/01/22  0737   INR  1 10       * I Have Reviewed All Lab Data Listed Above  * Additional Pertinent Lab Tests Reviewed: All Labs Within Last 24 Hours Reviewed      Recent Cultures (last 7 days):     Results from last 7 days   Lab Units 12/01/22  1225 11/30/22  1419   BLOOD CULTURE  No Growth at 24 hrs    No Growth at 24 hrs   --    URINE CULTURE   --  >100,000 cfu/ml Escherichia coli*       Last 24 Hours Medication List:   Current Facility-Administered Medications   Medication Dose Route Frequency Provider Last Rate   • acetaminophen  650 mg Oral Q4H PRN Nyla Schulz MD     • aluminum-magnesium hydroxide-simethicone  30 mL Oral Q6H PRN Nyla Schulz MD     • cefTRIAXone  1,000 mg Intravenous Q24H Nyla Schulz MD Stopped (12/02/22 1341)   • clopidogrel  75 mg Oral Daily Ailyn Griffiths MD     • heparin (porcine)  5,000 Units Subcutaneous [facility] Ailyn Griffiths MD     • HYDROmorphone  0 2 mg Intravenous Q4H PRN Ailyn Griffiths MD     • insulin detemir  12 Units Subcutaneous HS Ailyn Griffiths MD     • insulin lispro  1-5 Units Subcutaneous TID [facility] [place] Ailyn Griffiths MD     • insulin lispro  1-5 Units Subcutaneous HS Ailyn Griffiths MD     • lidocaine  1 application Urethral Daily PRN Ailyn Griffiths MD     • ondansetron  4 mg Intravenous Q6H PRN Ailyn Griffiths MD     • oxybutynin  5 mg Oral BID PRN Ailyn Griffiths MD     • oxyCODONE  2 5 mg Oral Q6H PRN Ailyn Griffiths MD     • pregabalin  75 mg Oral BID Ailyn Griffiths MD     • simethicone  80 mg Oral 4x Daily PRN Ailyn Griffiths MD     • sodium bicarbonate infusion  100 mL/hr Intravenous Continuous Izabella Fregoso  mL/hr (12/03/22 0242)   • tacrolimus  1 mg Oral BID Ailyn Griffiths MD     • tamsulosin  0 4 mg Oral Daily With Haim Wang MD     • temazepam  15 mg Oral HS PRN Ailyn Griffiths MD          Today, Patient Was Seen By: Nadeem Hodges DO    ** Please Note: Dictation voice to text software may have been used in the creation of this document   **

## 2022-12-03 NOTE — ASSESSMENT & PLAN NOTE
Lab Results   Component Value Date    HGBA1C 6 8 (H) 08/19/2022       Recent Labs     12/02/22  1617 12/02/22  2107 12/03/22  0729 12/03/22  1153   POCGLU 195* 191* 82 108       Blood Sugar Average: Last 72 hrs:  (P) 720 [id number]   Patient is on insulin as outpatient    Continue with the Levemir and add sliding scale  Monitor blood sugar  Adjust insulin dose per blood sugar

## 2022-12-03 NOTE — ASSESSMENT & PLAN NOTE
Lab Results   Component Value Date    EGFR 25 12/03/2022    EGFR 24 12/02/2022    EGFR 26 12/02/2022    CREATININE 2 40 (H) 12/03/2022    CREATININE 2 46 (H) 12/02/2022    CREATININE 2 34 (H) 12/02/2022   Creatinine 2 81  Closer to baseline  Monitor creatinine    Follow-up with nephrology as outpatient

## 2022-12-04 ENCOUNTER — HOME HEALTH ADMISSION (OUTPATIENT)
Dept: HOME HEALTH SERVICES | Facility: HOME HEALTHCARE | Age: 72
End: 2022-12-04

## 2022-12-04 VITALS
HEIGHT: 63 IN | RESPIRATION RATE: 18 BRPM | BODY MASS INDEX: 21.26 KG/M2 | SYSTOLIC BLOOD PRESSURE: 120 MMHG | DIASTOLIC BLOOD PRESSURE: 68 MMHG | OXYGEN SATURATION: 100 % | HEART RATE: 71 BPM | TEMPERATURE: 97.5 F | WEIGHT: 120 LBS

## 2022-12-04 LAB
ANION GAP SERPL CALCULATED.3IONS-SCNC: 6 MMOL/L (ref 4–13)
BACTERIA UR CULT: ABNORMAL
BASOPHILS # BLD AUTO: 0.02 THOUSANDS/ÂΜL (ref 0–0.1)
BASOPHILS # BLD AUTO: 0.02 THOUSANDS/ÂΜL (ref 0–0.1)
BASOPHILS NFR BLD AUTO: 0 % (ref 0–1)
BASOPHILS NFR BLD AUTO: 0 % (ref 0–1)
BUN SERPL-MCNC: 33 MG/DL (ref 5–25)
CALCIUM SERPL-MCNC: 6.9 MG/DL (ref 8.3–10.1)
CHLORIDE SERPL-SCNC: 114 MMOL/L (ref 96–108)
CO2 SERPL-SCNC: 24 MMOL/L (ref 21–32)
CREAT SERPL-MCNC: 1.91 MG/DL (ref 0.6–1.3)
EOSINOPHIL # BLD AUTO: 0.43 THOUSAND/ÂΜL (ref 0–0.61)
EOSINOPHIL # BLD AUTO: 0.46 THOUSAND/ÂΜL (ref 0–0.61)
EOSINOPHIL NFR BLD AUTO: 10 % (ref 0–6)
EOSINOPHIL NFR BLD AUTO: 8 % (ref 0–6)
ERYTHROCYTE [DISTWIDTH] IN BLOOD BY AUTOMATED COUNT: 15.2 % (ref 11.6–15.1)
ERYTHROCYTE [DISTWIDTH] IN BLOOD BY AUTOMATED COUNT: 15.2 % (ref 11.6–15.1)
GFR SERPL CREATININE-BSD FRML MDRD: 33 ML/MIN/1.73SQ M
GLUCOSE SERPL-MCNC: 119 MG/DL (ref 65–140)
GLUCOSE SERPL-MCNC: 147 MG/DL (ref 65–140)
GLUCOSE SERPL-MCNC: 161 MG/DL (ref 65–140)
HCT VFR BLD AUTO: 25.6 % (ref 36.5–49.3)
HCT VFR BLD AUTO: 29.7 % (ref 36.5–49.3)
HGB BLD-MCNC: 7.9 G/DL (ref 12–17)
HGB BLD-MCNC: 9.1 G/DL (ref 12–17)
IMM GRANULOCYTES # BLD AUTO: 0.01 THOUSAND/UL (ref 0–0.2)
IMM GRANULOCYTES # BLD AUTO: 0.02 THOUSAND/UL (ref 0–0.2)
IMM GRANULOCYTES NFR BLD AUTO: 0 % (ref 0–2)
IMM GRANULOCYTES NFR BLD AUTO: 0 % (ref 0–2)
LYMPHOCYTES # BLD AUTO: 0.86 THOUSANDS/ÂΜL (ref 0.6–4.47)
LYMPHOCYTES # BLD AUTO: 1.03 THOUSANDS/ÂΜL (ref 0.6–4.47)
LYMPHOCYTES NFR BLD AUTO: 18 % (ref 14–44)
LYMPHOCYTES NFR BLD AUTO: 19 % (ref 14–44)
MCH RBC QN AUTO: 23.9 PG (ref 26.8–34.3)
MCH RBC QN AUTO: 24.4 PG (ref 26.8–34.3)
MCHC RBC AUTO-ENTMCNC: 30.5 G/DL (ref 31.4–37.4)
MCHC RBC AUTO-ENTMCNC: 30.6 G/DL (ref 31.4–37.4)
MCV RBC AUTO: 78 FL (ref 82–98)
MCV RBC AUTO: 80 FL (ref 82–98)
MONOCYTES # BLD AUTO: 0.43 THOUSAND/ÂΜL (ref 0.17–1.22)
MONOCYTES # BLD AUTO: 0.52 THOUSAND/ÂΜL (ref 0.17–1.22)
MONOCYTES NFR BLD AUTO: 10 % (ref 4–12)
MONOCYTES NFR BLD AUTO: 9 % (ref 4–12)
NEUTROPHILS # BLD AUTO: 2.71 THOUSANDS/ÂΜL (ref 1.85–7.62)
NEUTROPHILS # BLD AUTO: 3.67 THOUSANDS/ÂΜL (ref 1.85–7.62)
NEUTS SEG NFR BLD AUTO: 61 % (ref 43–75)
NEUTS SEG NFR BLD AUTO: 65 % (ref 43–75)
NRBC BLD AUTO-RTO: 0 /100 WBCS
NRBC BLD AUTO-RTO: 0 /100 WBCS
PLATELET # BLD AUTO: 105 THOUSANDS/UL (ref 149–390)
PLATELET # BLD AUTO: 99 THOUSANDS/UL (ref 149–390)
PMV BLD AUTO: 12.8 FL (ref 8.9–12.7)
PMV BLD AUTO: 13.4 FL (ref 8.9–12.7)
POTASSIUM SERPL-SCNC: 3.3 MMOL/L (ref 3.5–5.3)
RBC # BLD AUTO: 3.24 MILLION/UL (ref 3.88–5.62)
RBC # BLD AUTO: 3.81 MILLION/UL (ref 3.88–5.62)
SODIUM SERPL-SCNC: 144 MMOL/L (ref 135–147)
WBC # BLD AUTO: 4.49 THOUSAND/UL (ref 4.31–10.16)
WBC # BLD AUTO: 5.69 THOUSAND/UL (ref 4.31–10.16)

## 2022-12-04 RX ORDER — OXYBUTYNIN CHLORIDE 5 MG/1
5 TABLET ORAL 2 TIMES DAILY PRN
Qty: 60 TABLET | Refills: 0 | Status: SHIPPED | OUTPATIENT
Start: 2022-12-04

## 2022-12-04 RX ORDER — POTASSIUM CHLORIDE 20 MEQ/1
40 TABLET, EXTENDED RELEASE ORAL ONCE
Status: COMPLETED | OUTPATIENT
Start: 2022-12-04 | End: 2022-12-04

## 2022-12-04 RX ADMIN — POTASSIUM CHLORIDE 40 MEQ: 1500 TABLET, EXTENDED RELEASE ORAL at 09:49

## 2022-12-04 RX ADMIN — HEPARIN SODIUM 5000 UNITS: 5000 INJECTION INTRAVENOUS; SUBCUTANEOUS at 05:27

## 2022-12-04 RX ADMIN — INSULIN LISPRO 1 UNITS: 100 INJECTION, SOLUTION INTRAVENOUS; SUBCUTANEOUS at 09:49

## 2022-12-04 RX ADMIN — HYDROMORPHONE HYDROCHLORIDE 0.2 MG: 0.2 INJECTION, SOLUTION INTRAMUSCULAR; INTRAVENOUS; SUBCUTANEOUS at 05:29

## 2022-12-04 RX ADMIN — TACROLIMUS 1 MG: 1 CAPSULE ORAL at 07:52

## 2022-12-04 RX ADMIN — CLOPIDOGREL BISULFATE 75 MG: 75 TABLET ORAL at 07:52

## 2022-12-04 RX ADMIN — PREGABALIN 75 MG: 75 CAPSULE ORAL at 07:52

## 2022-12-04 NOTE — ASSESSMENT & PLAN NOTE
Lab Results   Component Value Date    EGFR 33 12/04/2022    EGFR 25 12/03/2022    EGFR 24 12/02/2022    CREATININE 1 91 (H) 12/04/2022    CREATININE 2 40 (H) 12/03/2022    CREATININE 2 46 (H) 12/02/2022   Creatinine 2 81  Closer to baseline  Monitor creatinine    Follow-up with nephrology as outpatient

## 2022-12-04 NOTE — ASSESSMENT & PLAN NOTE
· Patient had cystoscopy with dilatation of the urethra and placement of Webb catheter yesterday by Urology  Patient came to the hospital with pain due to the Webb catheter  · [place] to be secondary to bladder spasm  · Continue with oxybutynin  · Pain control  · Consult urology  · Patient with noted crack in nephrostomy tube    IR consult

## 2022-12-04 NOTE — ASSESSMENT & PLAN NOTE
Lab Results   Component Value Date    HGBA1C 6 8 (H) 08/19/2022       Recent Labs     12/03/22  1153 12/03/22  1630 12/03/22 2059 12/04/22  0757   POCGLU 108 300* 128 161*       Blood Sugar Average: Last 72 hrs:  (P) 161 25   Patient is on insulin as outpatient    Continue with the Levemir and add sliding scale  Monitor blood sugar  Adjust insulin dose per blood sugar

## 2022-12-04 NOTE — CASE MANAGEMENT
Case Management Assessment & Discharge Planning Note    Patient name Trudi Bone  Location [address] 922/[facility] 922-01 MRN [id number]  : 1950 Date 2022       Current Admission Date: 2022  Current Admission Diagnosis:Bladder spasms   Patient Active Problem List    Diagnosis Date Noted   • Metabolic acidosis    • Suspected UTI 2022   • Palliative care encounter 2022   • Solitary pulmonary nodule 10/20/2022   • Hip pain, acute, right 10/17/2022   • Dementia ([facility] ) 10/12/2022   • Pulmonary nodule 10/08/2022   • Fall 10/08/2022   • Facial laceration 10/08/2022   • Chronic kidney disease (CKD), stage IV (severe) ([facility] ) 10/08/2022   • History of bladder cancer 10/08/2022   • History of liver transplant ([facility] ) 10/08/2022   • DM II (diabetes mellitus, type II), controlled ([facility]  ) 10/08/2022   • Anemia 10/08/2022   • Insomnia 10/08/2022   • Acute encephalopathy 10/08/2022   • History of hydronephrosis 10/08/2022   • Orthostatic hypotension 2022   • Right ankle pain 2022   • Orthostatic hypotension 2022   • Hydronephrosis 2022   • Multiple falls 2022   • Stage 4 chronic kidney disease ([facility] ) 2022   • Anemia due to chronic kidney disease 2022   • History of bladder cancer 2022   • Controlled type 2 diabetes mellitus with diabetic neuropathy, with long-term current use of insulin ([facility] ) 2022   • History of CVA (cerebrovascular accident) 2022   • History of COVID-19 2022   • Hospital discharge follow-up 2022   • Acute urinary retention 2022   • History of COVID-19 2022   • Anemia 2022   • Diarrhea 2022   • Mixed stress and urge urinary incontinence 2022   • Weight loss 2022   • Urethritis 2022   • Port-A-Cath in place 2022   • Malignant neoplasm of trigone of urinary bladder ([facility] ) 06/15/2022   • CKD (chronic kidney disease) 2022   • Generalized weakness 06/10/2022   • Nephrostomy status ([place] [address] ) 06/10/2022   • Bladder spasms 06/10/2022   • Acute blood loss anemia 05/29/2022   • Bladder tumor 05/26/2022   • Gross hematuria 05/23/2022   • Hydronephrosis of right kidney 05/23/2022   • Mild cognitive impairment 05/13/2021   • Subdural hygroma    • 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[place] [address] )    • Hand lesion 06/23/2020   • Type 2 diabetes mellitus with chronic kidney disease ([place] [address] ) 10/14/2019   • Type 2 diabetes mellitus with diabetic peripheral angiopathy without gangrene, with long-term current use of insulin ([place] [address] ) 06/13/2019   • Rash 09/11/2018   • Medicare annual wellness visit, subsequent 06/07/2018   • Screening for colon cancer 06/07/2018   • Hepatitis B core antibody positive 09/25/2017   • Thrombocytopenia ([place] [address] ) 09/20/2017   • History of CVA (cerebrovascular accident) 09/18/2017   • Controlled diabetes mellitus with diabetic neuropathy, with long-term current use of insulin ([place] [address] ) 09/18/2017   • Liver transplant status ([place] [address] ) 09/18/2017   • History of immunosuppression therapy 09/18/2017   • History of hepatitis C 09/18/2017   • Pruritus 08/03/2016   • Spondylosis of cervical region without myelopathy or radiculopathy 02/02/2016   • Headache, chronic daily 09/21/2015   • Vitamin D deficiency 12/16/2014   • Occipital neuralgia 04/08/2014   • Migraine headache 03/31/2014   • BPPV (benign paroxysmal positional vertigo), unspecified laterality 12/31/2013   • Cerebral arterial aneurysm 12/02/2013   • Prurigo nodularis 10/15/2013   • 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   • Erectile dysfunction of non-organic origin 09/11/2013   • History of cirrhosis 07/09/2012   • Peripheral neuropathy 05/07/2012      LOS (days): 2  Geometric Mean LOS (GMLOS) (days):   Days to GMLOS:     OBJECTIVE:    Risk of Unplanned Readmission Score: 47 87         Current admission status: Inpatient       Preferred Pharmacy:   [address] [facility], [address] [facility]  [address] [facility] [address] [address]  Phone: [phone] Fax: [phone]    [address] [place], [address] [place] [facility] [address] [facility] [address] [address]  Phone: [phone] Fax: [phone]    PATIENT/FAMILY REPORTS NO PREFERRED PHARMACY  No address on file      Primary Care Provider: Vincent Min DO    Primary Insurance: [facility]  Secondary Insurance:     ASSESSMENT:  Kathia Hoover Proxies    There are no active Health Care Proxies on file  Advance Directives  Does patient have a [address] [facility] [address]?: No  Does patient have Advance Directives?: No              Patient Information  Admitted from[de-identified] Home  Mental Status: Alert  Assessment information provided by[de-identified] Spouse  Primary Caregiver: Self  Support Systems: Family members, Spouse/significant other  Home entry access options   Select all that apply : Stairs  Number of steps to enter home : 2  Type of Current Residence: 2 story home  Upon entering residence, is there a bedroom on the main floor (no further steps)?: Yes  Upon entering residence, is there a bathroom on the main floor (no further steps)?: Yes  In the last 12 months, was there a time when you were not able to pay the mortgage or rent on time?: No  In the last 12 months, how many places have you lived?: 1  In the last 12 months, was there a time when you did not have a steady place to sleep or slept in a shelter (including now)?: No  Living Arrangements: Lives w/ Spouse/significant other    Activities of Daily Living Prior to Admission  Functional Status: Independent  Completes ADLs independently?: Yes  Ambulates independently?: Yes  Does patient use assisted devices?: No  Does patient currently own DME?: No  Does patient have a history of Outpatient Therapy (PT/OT)?: No  Does the patient have a history of Short-Term Rehab?: No  Does patient have a history of HHC?: Yes  Does patient currently have [address]?: Yes    Current Home Health Care  Type of Current Home Care Services: Nurse visit, Home PT  [address][de-identified] [address] [facility] Provider[de-identified] PCP    Patient Information Continued  Income Source: Pension/intermediate  Does patient have prescription coverage?: Yes  Within the past 12 months, you worried that your food would run out before you got the money to buy more : Never true  Within the past 12 months, the food you bought just didn't last and you didn't have money to get more : Never true  Does patient receive dialysis treatments?: No  Does patient have a history of substance abuse?: Yes  Is patient currently in treatment for substance abuse?: N/A - sober  Does patient have a history of Mental Health Diagnosis?: No         Means of Transportation  Means of Transport to Appts[de-identified] Family transport  In the past 12 months, has lack of transportation kept you from medical appointments or from getting medications?: No  In the past 12 months, has lack of transportation kept you from meetings, work, or from getting things needed for daily living?: No        DISCHARGE DETAILS:    Discharge planning discussed with[de-identified] SHERLYN Gleason  Freedom of Choice: Yes                   Contacts  Patient Contacts: Tatianna Clear  Relationship to Patient[de-identified] Family  Contact Method: Phone  Phone Number: [phone]  Reason/Outcome: Continuity of Care, Emergency Contact, Discharge Planning    [facility]         Is the patient interested in [facility] at discharge?: Yes  [address] requested[de-identified] Nursing, Physical [address] Name[de-identified] [facility] Provider[de-identified] PCP  Home Health Services Needed[de-identified] Gait/ADL Training, Urinary Incontinence Catheter Management, Evaluate Functional Status and Safety  Homebound Criteria Met[de-identified] Requires the Assistance of Another Person for Safe Ambulation or to Leave the Home  Supporting Clincal Findings[de-identified] Limited Endurance              Would you like to participate in our [address] service program?  : No - Declined    Treatment Team Recommendation: Home with [address] post acute care recommendation was made by your care team for [facility]  Discussed Freedom of Choice with SO  Choice is to return/continue services  Referral via AIDIN to FREDO OCASIO    CM reviewed d/c planning process including the following: identifying help at home, patient preference for d/c planning needs, Discharge Lounge, [facility] Meds to Bed program, availability of treatment team to discuss questions or concerns patient and/or family may have regarding understanding medications and recognizing signs and symptoms once discharged  CM also encouraged patient to follow up with all recommended appointments after discharge  Patient advised of importance for patient and family to participate in managing patient’s medical well being  Patient/caregiver received discharge checklist   Content reviewed  Patient/caregiver encouraged to participate in discharge plan of care prior to discharge home

## 2022-12-04 NOTE — PLAN OF CARE
Problem: PAIN - ADULT  Goal: Verbalizes/displays adequate comfort level or baseline comfort level  Description: Interventions:  - Encourage patient to monitor pain and request assistance  - Assess pain using appropriate pain scale  - Administer analgesics based on type and severity of pain and evaluate response  - Implement non-pharmacological measures as appropriate and evaluate response  - Consider cultural and social influences on pain and pain management  - Notify physician/advanced practitioner if interventions unsuccessful or patient reports new pain  Outcome: Adequate for Discharge     Problem: INFECTION - ADULT  Goal: Absence or prevention of progression during hospitalization  Description: INTERVENTIONS:  - Assess and monitor for signs and symptoms of infection  - Monitor lab/diagnostic results  - Monitor all insertion sites, i e  indwelling lines, tubes, and drains  - Monitor endotracheal if appropriate and nasal secretions for changes in amount and color  - [place] appropriate cooling/warming therapies per order  - Administer medications as ordered  - Instruct and encourage patient and family to use good hand hygiene technique  - Identify and instruct in appropriate isolation precautions for identified infection/condition  Outcome: Adequate for Discharge  Goal: Absence of fever/infection during neutropenic period  Description: INTERVENTIONS:  - Monitor WBC    Outcome: Adequate for Discharge     Goal: Maintain or return to baseline ADL function  Description: INTERVENTIONS:  -  Assess patient's ability to carry out ADLs; assess patient's baseline for ADL function and identify physical deficits which impact ability to perform ADLs (bathing, care of mouth/teeth, toileting, grooming, dressing, etc )  - Assess/evaluate cause of self-care deficits   - Assess range of motion  - Assess patient's mobility; develop plan if impaired  - Assess patient's need for assistive devices and provide as appropriate  - Encourage maximum independence but intervene and supervise when necessary  - Involve family in performance of ADLs  - Assess for home care needs following discharge   - Consider OT consult to assist with ADL evaluation and planning for discharge  - Provide patient education as appropriate  Outcome: Adequate for Discharge  Goal: Maintains/Returns to pre admission functional level  Description: INTERVENTIONS:  - Perform BMAT or MOVE assessment daily    - Set and communicate daily mobility goal to care team and patient/family/caregiver  - Collaborate with rehabilitation services on mobility goals if consulted  - Dangle patient 4 times a day  - Stand patient 4 times a day  - Ambulate patient 4 times a day  - Out of bed to chair 3 times a day   - Out of bed for meals 3 times a day  - Out of bed for toileting  - Record patient progress and toleration of activity level   Outcome: Adequate for Discharge     Problem: DISCHARGE PLANNING  Goal: Discharge to home or other facility with appropriate resources  Description: INTERVENTIONS:  - Identify barriers to discharge w/patient and caregiver  - Arrange for needed discharge resources and transportation as appropriate  - Identify discharge learning needs (meds, wound care, etc )  - Arrange for interpretive services to assist at discharge as needed  - Refer to Case Management Department for coordinating discharge planning if the patient needs post-hospital services based on physician/advanced practitioner order or complex needs related to functional status, cognitive ability, or social support system  Outcome: Adequate for Discharge     Problem: Knowledge Deficit  Goal: Patient/family/caregiver demonstrates understanding of disease process, treatment plan, medications, and discharge instructions  Description: Complete learning assessment and assess knowledge base    Interventions:  - Provide teaching at level of understanding  - Provide teaching via preferred learning methods  Outcome: Adequate for Discharge     Problem: Potential for Falls  Goal: Patient will remain free of falls  Description: INTERVENTIONS:  - Educate patient/family on patient safety including physical limitations  - Instruct patient to call for assistance with activity   - Consult OT/PT to assist with strengthening/mobility   - Keep Call bell within reach  - Keep bed low and locked with side rails adjusted as appropriate  - Keep care items and personal belongings within reach  - Initiate and maintain comfort rounds  - Make Fall Risk Sign visible to staff  - Offer Toileting every 2 Hours, in advance of need  - Initiate/Maintain alarm  - Obtain necessary fall risk management equipment:   - Apply yellow socks and bracelet for high fall risk patients  - Consider moving patient to room near nurses station  Outcome: Adequate for Discharge

## 2022-12-05 ENCOUNTER — TRANSITIONAL CARE MANAGEMENT (OUTPATIENT)
Dept: FAMILY MEDICINE CLINIC | Facility: CLINIC | Age: 72
End: 2022-12-05

## 2022-12-05 ENCOUNTER — HOME CARE VISIT (OUTPATIENT)
Dept: HOME HEALTH SERVICES | Facility: HOME HEALTHCARE | Age: 72
End: 2022-12-05

## 2022-12-05 VITALS
TEMPERATURE: 96.6 F | RESPIRATION RATE: 16 BRPM | HEART RATE: 65 BPM | SYSTOLIC BLOOD PRESSURE: 126 MMHG | DIASTOLIC BLOOD PRESSURE: 70 MMHG | OXYGEN SATURATION: 100 %

## 2022-12-05 LAB
BACTERIA UR CULT: ABNORMAL
BACTERIA UR CULT: ABNORMAL

## 2022-12-05 NOTE — CASE COMMUNICATION
[facility] has admitted your patient to [address] [place] [address] service with the following disciplines:      SN and PT  This report is informational only, no responses is needed  Primary focus of home health care:   Patient stated goals of care: improve mobility and endurance and fluid intake  Anticipated visit pattern: 2w3 and next visit date: CKD   See medication list - meds in home differ from AVS:  Missing temazepam 7 5 mg 2 tablets HS [facility] for sleep, and oxybutynin  Wife will get from pharmacy ASAP  Only using 10 units levemir at HS not 15 units  Patient has no need for tylenol  Patient was taking sodium bicarbonate 650 mg BID, educated to stop  Significant clinical findings: Patient reports left lower gum edema and pain  SOB with stairs or incline  Needs to get a blood pressure cuff and a scale  Potential barriers to goal achievement: Dementia      Thank you for a llowing us to participate in the care of your patient        National Christian Ortez RN

## 2022-12-05 NOTE — UTILIZATION REVIEW
NOTIFICATION OF ADMISSION DISCHARGE   This is a Notification of Discharge from [address]  Please be advised that this patient has been discharge from our facility  Below you will find the admission and discharge date and time including the patient’s disposition  UTILIZATION REVIEW CONTACT:  Sydnie Bunn  Utilization   Network Utilization Review Department  Phone: [phone] x carefully listen to the prompts  All voicemails are confidential   Email: [email or web address]     ADMISSION INFORMATION  PRESENTATION DATE: 12/1/2022  6:50 AM  OBERVATION ADMISSION DATE:   INPATIENT ADMISSION DATE: 12/2/22  5:11 PM   DISCHARGE DATE: 12/4/2022  3:00 PM   DISPOSITION:Home/Self Care    IMPORTANT INFORMATION:  Send all requests for admission clinical reviews, approved or denied determinations and any other requests to dedicated fax number below belonging to the campus where the patient is receiving treatment   List of dedicated fax numbers:  [address] DENIALS (Administrative/Medical Necessity) [phone]   [address] (Maternity/NICU/Pediatrics) [phone]   [facility] [phone]   [facility] 87 [phone]   [facility] [address] [phone]   220 [facility] [phone]   [address] [place] [address] [phone]   [address] [place], [place] [address] [address] [phone]   [facility]  [phone]   40[address] [facility] [phone]   412 [facility] 850 [facility] [phone]

## 2022-12-06 ENCOUNTER — OFFICE VISIT (OUTPATIENT)
Dept: FAMILY MEDICINE CLINIC | Facility: CLINIC | Age: 72
End: 2022-12-06

## 2022-12-06 VITALS
OXYGEN SATURATION: 100 % | BODY MASS INDEX: 21.26 KG/M2 | WEIGHT: 120 LBS | DIASTOLIC BLOOD PRESSURE: 60 MMHG | TEMPERATURE: 97.2 F | SYSTOLIC BLOOD PRESSURE: 120 MMHG | HEART RATE: 78 BPM | RESPIRATION RATE: 18 BRPM | HEIGHT: 63 IN

## 2022-12-06 DIAGNOSIS — R53.1 GENERALIZED WEAKNESS: ICD-10-CM

## 2022-12-06 DIAGNOSIS — N18.4 ANEMIA DUE TO STAGE 4 CHRONIC KIDNEY DISEASE (HCC): Chronic | ICD-10-CM

## 2022-12-06 DIAGNOSIS — D63.1 ANEMIA DUE TO STAGE 4 CHRONIC KIDNEY DISEASE (HCC): Chronic | ICD-10-CM

## 2022-12-06 DIAGNOSIS — E11.22 TYPE 2 DIABETES MELLITUS WITH STAGE 3B CHRONIC KIDNEY DISEASE, WITH LONG-TERM CURRENT USE OF INSULIN (HCC): ICD-10-CM

## 2022-12-06 DIAGNOSIS — N18.32 TYPE 2 DIABETES MELLITUS WITH STAGE 3B CHRONIC KIDNEY DISEASE, WITH LONG-TERM CURRENT USE OF INSULIN (HCC): ICD-10-CM

## 2022-12-06 DIAGNOSIS — N32.89 BLADDER SPASMS: ICD-10-CM

## 2022-12-06 DIAGNOSIS — Z09 HOSPITAL DISCHARGE FOLLOW-UP: Primary | ICD-10-CM

## 2022-12-06 DIAGNOSIS — Z79.4 TYPE 2 DIABETES MELLITUS WITH STAGE 3B CHRONIC KIDNEY DISEASE, WITH LONG-TERM CURRENT USE OF INSULIN (HCC): ICD-10-CM

## 2022-12-06 PROBLEM — S01.81XA FACIAL LACERATION: Status: RESOLVED | Noted: 2022-10-08 | Resolved: 2022-12-06
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
## 2022-12-06 PROBLEM — R33.8 ACUTE URINARY RETENTION: Status: RESOLVED | Noted: 2022-09-12 | Resolved: 2022-01-01

## 2022-12-06 PROBLEM — Z86.16 HISTORY OF COVID-19: Status: RESOLVED | Noted: 2022-09-12 | Resolved: 2022-12-06

## 2022-12-06 PROBLEM — D62 ACUTE BLOOD LOSS ANEMIA: Status: RESOLVED | Noted: 2022-05-29 | Resolved: 2022-01-01

## 2022-12-06 PROBLEM — S01.81XA FACIAL LACERATION: Status: RESOLVED | Noted: 2022-10-08 | Resolved: 2022-01-01

## 2022-12-06 PROBLEM — Z86.16 HISTORY OF COVID-19: Status: RESOLVED | Noted: 2022-09-12 | Resolved: 2022-01-01

## 2022-12-06 PROBLEM — R33.8 ACUTE URINARY RETENTION: Status: RESOLVED | Noted: 2022-09-12 | Resolved: 2022-12-06
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
## 2022-12-06 PROBLEM — D62 ACUTE BLOOD LOSS ANEMIA: Status: RESOLVED | Noted: 2022-05-29 | Resolved: 2022-12-06

## 2022-12-06 LAB
BACTERIA BLD CULT: NORMAL
BACTERIA BLD CULT: NORMAL
SL AMB POCT HEMOGLOBIN AIC: 7.4 (ref ?–6.5)

## 2022-12-06 RX ORDER — ZOLPIDEM TARTRATE 5 MG/1
5 TABLET ORAL
COMMUNITY
Start: 2022-12-05

## 2022-12-06 NOTE — PROGRESS NOTES
Assessment/Plan:    1  Hospital discharge follow-up    2  Type 2 diabetes mellitus with stage 3b chronic kidney disease, with long-term current use of insulin (HCC)  Assessment & Plan:    Lab Results   Component Value Date    HGBA1C 6 8 (H) 08/19/2022   cont current meds  Recent Results (from the past 24 hour(s))   POCT hemoglobin A1c    Collection Time: 12/06/22 11:07 AM   Result Value Ref Range    Hemoglobin A1C 7 4 (A) 6 5       Orders:  -     POCT hemoglobin A1c    3  Generalized weakness  Assessment & Plan:  Improved today      4  Bladder spasms  Assessment & Plan:  Improved and resolved      5  Anemia due to stage 4 chronic kidney disease [facility])  Assessment & Plan:  improved              There are no Patient Instructions on file for this visit  Return if symptoms worsen or fail to improve  Subjective:      Patient ID: Renee Naranjo is a 74 y o  male  Chief Complaint   Patient presents with   • Transition of Care Management     Patient here for Tcm discharged on 12/04/2022 [facility]        Here for hospital follow up  Had urology procedure  In office and felt weak afterward with pain   He presented to the ER    cysto performed with dilation of urethra with and flores placement  Flores was removed  He had cloudy urine  Nephrostomy tube was cracked and this was replacement  Will follow up with urology ad nephrology    He states he is eating better and feeling better  He has an appt with IR Ubaldo Kirk function stable on discharge      The following portions of the patient's history were reviewed and updated as appropriate: allergies, current medications, past family history, past medical history, past social history, past surgical history and problem list       TCM Call     Date and time call was made  12/5/2022  5:46 PM    Hospital care reviewed  Records not available    Patient was hospitialized at  [facility]    Date of Admission  12/01/22    Date of discharge  12/04/22    Diagnosis Bladder Spasms    Disposition  Home    Were the patients medications reviewed and updated  No    Current Symptoms  None    Pain with urination severity  Mild    Pain with urination  Gradual      TCM Call     Post hospital issues  None    Should patient be enrolled in anticoag monitoring? Yes    Scheduled for follow up? Yes    Did you obtain your prescribed medications  Yes    Do you need help managing your prescriptions or medications  No    Is transportation to your appointment needed  No    I have advised the patient to call PCP with any new or worsening symptoms  Dayana Ortiz,     Living Arrangements  Spouse or Significiant other    Support System  None    Are you recieving any outpatient services  No    Are you recieving home care services  Yes    Types of home care services  Nurse visit    Are you using any community resources  No    Current waiver services  No    Have you fallen in the last 12 months  No    How many times  3 or 4    Interperter language line needed  No    Counseling  Patient        Review of Systems   Constitutional: Negative  HENT: Negative  Eyes: Negative  Respiratory: Negative  Cardiovascular: Negative  Gastrointestinal: Negative  Endocrine: Negative  Genitourinary: Negative  Musculoskeletal: Positive for back pain (mild)  Allergic/Immunologic: Negative  Neurological: Negative  Hematological: Negative  Psychiatric/Behavioral: Negative            Current Outpatient Medications   Medication Sig Dispense Refill   • acetaminophen (TYLENOL) 325 mg tablet Take 3 tablets (975 mg total) by mouth every 8 (eight) hours  0   • Alcohol Swabs (B-D SINGLE USE SWABS REGULAR) PADS USE 2-3 TIMES DAILY AS NEEDED     • Blood Glucose Calibration (OT ULTRA/FASTTK CNTRL SOLN) SOLN USE AS DIRECTED 1 each 0   • clopidogrel (PLAVIX) 75 mg tablet TAKE 1 TABLET (75 MG TOTAL) BY MOUTH DAILY 90 tablet 3   • Comfort EZ Pen Needles 32G X 4 MM MISC USE 3-4 AS DIRECTED 100 each 5 • Continuous Blood Gluc Sensor (FreeStyle Frank 14 Day Sensor) MISC Use 1 Device 4 (four) times a day 1 each 5   • Incontinence Supply Disposable (Incontinence Brief Medium) MISC Use 4 (four) times a day 120 each 0   • insulin detemir (Levemir FlexTouch) 100 Units/mL injection pen Inject 15 Units under the skin daily at bedtime 15 mL 3   • Lancet Devices (Lancing Device) MISC USE AS DIRECTED 1 each 0   • loperamide (IMODIUM) 2 mg capsule Take 1 capsule (2 mg total) by mouth 2 (two) times a day as needed for diarrhea Madelia carmen tableta dos veces por cynthia si tiene diarrea 90 capsule 0   • Nutritional Supplements (Glucerna Shake) LIQD Take 1 Bottle by mouth 3 (three) times a day 76929 mL 0   • OneTouch Ultra test strip TEST 3-4 TIMES A  each 4   • oxybutynin (DITROPAN) 5 mg tablet Take 1 tablet (5 mg total) by mouth 2 (two) times a day as needed (bladder spasms) 60 tablet 0   • pregabalin (LYRICA) 75 mg capsule Take 1 capsule (75 mg total) by mouth 2 (two) times a day for 10 days 60 capsule 5   • rosuvastatin (CRESTOR) 40 MG tablet TAKE ONE (1) TABLET BY MOUTH DAILY 30 tablet 5   • sodium chloride 10 ML BY INTRACATHETER ROUTE DAILY INTRACATHETER FLUSHING DAILY   MAY SUBSTITUTE PREFILLED SYRINGE WITH NORMAL SALINE 10 ML VIALS, 10 ML SYRI     • tacrolimus (PROGRAF) 1 mg capsule TAKE 1 CAPSULE (1 MG TOTAL) BY MOUTH EVERY 12 (TWELVE) HOURS 180 capsule 3   • tamsulosin (FLOMAX) 0 4 mg Take 1 capsule (0 4 mg total) by mouth daily with dinner 90 capsule 3   • temazepam (RESTORIL) 7 5 mg capsule Take 2 capsules (15 mg total) by mouth daily at bedtime as needed for sleep 30 capsule 0   • Continuous Blood Gluc  (FreeStyle Frank 14 Day [place]) NATALIA Use 1 Device continuous (Patient not taking: Reported on 12/6/2022) 1 each 0   • zolpidem (AMBIEN) 5 mg tablet Take 5 mg by mouth daily at bedtime as needed       Current Facility-Administered Medications   Medication Dose Route Frequency Provider Last Rate Last Admin   • lidocaine (URO-JET, GLYDO) 2 % urethral/mucosal gel 1 application  1 application Urethral Daily PRN Kamla Ray MD           Objective:    /60 (BP Location: Left arm, Patient Position: Sitting, Cuff Size: Standard)   Pulse 78   Temp (!) 97 2 °F (36 2 °C) (Tympanic)   Resp 18   Ht 5' 3" (1 6 m)   Wt 54 4 kg (120 lb)   SpO2 100%   BMI 21 26 kg/m²        Physical Exam  Vitals and nursing note reviewed  Constitutional:       Appearance: Normal appearance  HENT:      Head: Normocephalic and atraumatic  Eyes:      Extraocular Movements: Extraocular movements intact  Pupils: Pupils are equal, round, and reactive to light  Cardiovascular:      Rate and Rhythm: Normal rate and regular rhythm  Pulses: Normal pulses  Heart sounds: Normal heart sounds  Pulmonary:      Effort: Pulmonary effort is normal       Breath sounds: Normal breath sounds  Abdominal:      General: Abdomen is flat  Palpations: Abdomen is soft  Musculoskeletal:      Cervical back: Normal range of motion and neck supple  Neurological:      General: No focal deficit present  Mental Status: He is alert and oriented to person, place, and time  Psychiatric:         Mood and Affect: Mood normal          Behavior: Behavior normal          Thought Content:  Thought content normal          Judgment: Judgment normal                 Claude Ramos DO

## 2022-12-06 NOTE — ASSESSMENT & PLAN NOTE
Lab Results   Component Value Date    HGBA1C 6 8 (H) 08/19/2022   cont current meds  Recent Results (from the past 24 hour(s))   POCT hemoglobin A1c    Collection Time: 12/06/22 11:07 AM   Result Value Ref Range    Hemoglobin A1C 7 4 (A) 6 5

## 2022-12-07 ENCOUNTER — HOME CARE VISIT (OUTPATIENT)
Dept: HOME HEALTH SERVICES | Facility: HOME HEALTHCARE | Age: 72
End: 2022-12-07

## 2022-12-08 ENCOUNTER — TELEPHONE (OUTPATIENT)
Dept: UROLOGY | Facility: CLINIC | Age: 72
End: 2022-12-08

## 2022-12-08 ENCOUNTER — HOME CARE VISIT (OUTPATIENT)
Dept: HOME HEALTH SERVICES | Facility: HOME HEALTHCARE | Age: 72
End: 2022-12-08

## 2022-12-08 ENCOUNTER — TELEPHONE (OUTPATIENT)
Dept: FAMILY MEDICINE CLINIC | Facility: CLINIC | Age: 72
End: 2022-12-08

## 2022-12-08 VITALS
DIASTOLIC BLOOD PRESSURE: 60 MMHG | RESPIRATION RATE: 20 BRPM | SYSTOLIC BLOOD PRESSURE: 116 MMHG | HEART RATE: 77 BPM | OXYGEN SATURATION: 100 %

## 2022-12-08 NOTE — TELEPHONE ENCOUNTER
[facility] called and stated that he is having surgery with them on 1/18 and wanted to know if you can clear him based on his appointment  From yesterday  No EKG needed, just a Urine culture that is not due to be done till 2 weeks before surgery  He is having a stent replacement  If he is ok to be cleared based on visit from yesterday they just need notes updated saying he is cleared for surgery  Please advise      Made aware that you are out of the office

## 2022-12-08 NOTE — CASE COMMUNICATION
PT priscaal completed this date  Patient at baseline of function per wife  Patient completed 6 minutes of continuous gait activities without AD included steps indep with PO2 100% post walk  Transfers from household surfaces indep  No further PT visits identified

## 2022-12-08 NOTE — TELEPHONE ENCOUNTER
Called and spoke with patients wife Marlin Palomo to schedule surgery  Patient scheduled 01/18/2022 at [address] with Dr Cinthya Munson  Verbally went over instructions with Aria:    -nothing to eat or drink after midnight the night prior  -patient will need a  too and from   -hospital will call day prior with arrival time   -stop all blood thinning medications 7 days prior (med hold form sent to Dr Peter Armenta for plavix)  -urine culture to be done 2 weeks prior  -H&P appt 01/10/2023   -Auth tracker updated 12/08/2022  Surgical packet mailed 12/8/2022

## 2022-12-09 ENCOUNTER — TELEPHONE (OUTPATIENT)
Dept: FAMILY MEDICINE CLINIC | Facility: CLINIC | Age: 72
End: 2022-12-09

## 2022-12-09 NOTE — TELEPHONE ENCOUNTER
Left message patient needs to have Iris exam redone the pictures form his last one on 12/6 were unreadable please put on nurse schedule  Must be done by end of year

## 2022-12-12 ENCOUNTER — TELEPHONE (OUTPATIENT)
Dept: NEPHROLOGY | Facility: CLINIC | Age: 72
End: 2022-12-12

## 2022-12-12 NOTE — TELEPHONE ENCOUNTER
Appointment Confirmation   Person confirmed appointment with  If not patient, name of the person Answering Machine    Date and time of appointment 12/13/22   1:30 pm   Patient acknowledged and will be at appointment? no    Did you advise the patient that they will need a urine sample if they are a new patient?  N/A    Did you advise the patient to bring their current medications for verification? (including any OTC) Yes    Additional Information

## 2022-12-12 NOTE — TELEPHONE ENCOUNTER
Spoke with Kike Antunez and schedule appointment for 12/13/2022 with Dr Janessa Cabral in the [place] location  Returning Rosibel's call to reschedule patients previous appointment when pt was hospitalized

## 2022-12-12 NOTE — TELEPHONE ENCOUNTER
Appointment Confirmation   Person confirmed appointment with  If not patient, name of the person Mendel Rico   Date and time of appointment 12/13/22  1:30 pm   Patient acknowledged and will be at appointment? yes    Did you advise the patient that they will need a urine sample if they are a new patient?  N/A    Did you advise the patient to bring their current medications for verification? (including any OTC) Yes    Additional Information

## 2022-12-13 ENCOUNTER — OFFICE VISIT (OUTPATIENT)
Dept: NEPHROLOGY | Facility: CLINIC | Age: 72
End: 2022-12-13

## 2022-12-13 ENCOUNTER — HOME CARE VISIT (OUTPATIENT)
Dept: HOME HEALTH SERVICES | Facility: HOME HEALTHCARE | Age: 72
End: 2022-12-13

## 2022-12-13 VITALS — HEART RATE: 72 BPM | DIASTOLIC BLOOD PRESSURE: 72 MMHG | SYSTOLIC BLOOD PRESSURE: 124 MMHG

## 2022-12-13 DIAGNOSIS — N18.9 CHRONIC KIDNEY DISEASE, UNSPECIFIED CKD STAGE: Primary | ICD-10-CM

## 2022-12-13 PROBLEM — N18.4 CHRONIC KIDNEY DISEASE (CKD), STAGE IV (SEVERE) (HCC): Status: RESOLVED | Noted: 2022-10-08 | Resolved: 2022-12-13

## 2022-12-13 PROBLEM — E11.22 TYPE 2 DIABETES MELLITUS WITH CHRONIC KIDNEY DISEASE (HCC): Status: RESOLVED | Noted: 2019-10-14 | Resolved: 2022-01-01

## 2022-12-13 PROBLEM — N18.4 STAGE 4 CHRONIC KIDNEY DISEASE (HCC): Chronic | Status: RESOLVED | Noted: 2022-09-20 | Resolved: 2022-01-01

## 2022-12-13 PROBLEM — N18.4 CHRONIC KIDNEY DISEASE (CKD), STAGE IV (SEVERE) (HCC): Status: RESOLVED | Noted: 2022-10-08 | Resolved: 2022-01-01

## 2022-12-13 PROBLEM — N18.4 STAGE 4 CHRONIC KIDNEY DISEASE (HCC): Chronic | Status: RESOLVED | Noted: 2022-09-20 | Resolved: 2022-12-13

## 2022-12-13 PROBLEM — E11.22 TYPE 2 DIABETES MELLITUS WITH CHRONIC KIDNEY DISEASE (HCC): Status: RESOLVED | Noted: 2019-10-14 | Resolved: 2022-12-13

## 2022-12-13 NOTE — PATIENT INSTRUCTIONS
You are here for follow-up and I do feel that you look well you are hopefully starting to gain a little weight which is a sign of healthier eating better which is also a good sign  With respect to the kidney function the creatinine test was 1 9 which is even better than it was couple years ago  I think now that they have discovered this obstruction of the right kidney and its been relieved your kidney functions improved because of that which is good  Blood pressure is excellent continue current medications  Your biggest issue you are going through his treatment for your bladder cancer and at this point it seems the new plan is going to try and internalize the nephrostomy tube so that the stent is inside because you are able to urinate through your bladder  Continue follow-up with urology and we will continue to monitor your kidney function

## 2022-12-13 NOTE — LETTER
December 13, 2022     Isabel Mata, [address] [facility]  [address] [address]    Patient: Karen Mesa   YOB: 1950   Date of Visit: 12/13/2022       Dear Dr Anselmo Lovell:    Thank you for referring Karen Mesa to me for evaluation  Below are my notes for this consultation  If you have questions, please do not hesitate to call me  I look forward to following your patient along with you  Sincerely,        Myrna Sheehan MD        CC: No Recipients  Myrna Sheehan MD  12/13/2022  2:29 PM  Sign when Signing Visit  NEPHROLOGY PROGRESS NOTE    Karen Mesa 74 y o  male MRN: [id number]  Unit/Bed#:  Encounter: [id number]  Reason for Consult: Chronic kidney disease    The patient is here for follow-up he looks well he is gained a little bit of weight says he is feeling well and eating a lot  He is going through chemotherapy radiation  He recently had cystoscopy done with a right ureteral stent placement  He then went home developed a urinary tract infection and was seen by urology at the hospital   He is now feeling well after antibiotics  His right nephrostomy tube is capped  ASSESSMENT/PLAN:  #1 renal    Patient has chronic kidney disease creatinines had been running in the 2 range and felt given lack of heavy proteinuria it may been due to 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  He was discovered to have right-sided hydronephrosis related to a bladder tumor and now the obstruction is relieved and his creatinine is actually 1 9 which is better than it had been  His blood pressure is excellent we reviewed his medications and overall he is doing well and again renal function is stable  Continue current medications with no changes  #2 bladder cancer    Patient underwent resection of bladder tumor received chemo therapy and radiation    He had cystoscopy done recently and reviewing the urologist note at the time of the operation there was some inflammation but they did not see any evidence of tumor although the urine was cloudy  He is being monitored with serial imaging as there was a small nodule in his lung and we will continue to follow  Labs and follow-up as scheduled and he was asked to call if there is any problems or questions before next visit  SUBJECTIVE:  Review of Systems   Constitutional: Negative for chills, diaphoresis, fever and malaise/fatigue  HENT: Negative  Eyes: Negative  Cardiovascular: Negative  Negative for chest pain, dyspnea on exertion, leg swelling and orthopnea  Respiratory: Negative  Negative for cough, shortness of breath, sputum production and wheezing  Gastrointestinal: Negative for abdominal pain, diarrhea, melena and nausea  Genitourinary: Negative for dysuria, flank pain, hematuria and incomplete emptying  Right percutaneous nephrostomy   Neurological: Negative for dizziness, focal weakness, light-headedness and weakness  Psychiatric/Behavioral: Negative for altered mental status, depression, hallucinations and hypervigilance  OBJECTIVE:  Current Weight:    [email or web address]@[email or web address] com:     Blood pressure 124/72, pulse 72  , There is no height or weight on file to calculate BMI  [unfilled]    Physical Exam: /72 (BP Location: Left arm, Patient Position: Sitting, Cuff Size: Standard)   Pulse 72   Physical Exam  Constitutional:       General: He is not in acute distress  Appearance: He is not toxic-appearing or diaphoretic  HENT:      Head: Normocephalic and atraumatic  Nose: Nose normal       Mouth/Throat:      Mouth: Mucous membranes are moist    Eyes:      General: No scleral icterus  Extraocular Movements: Extraocular movements intact  Cardiovascular:      Rate and Rhythm: Normal rate and regular rhythm  Heart sounds: No friction rub  No gallop        Comments: No edema  Pulmonary:      Effort: Pulmonary effort is normal  No respiratory distress  Breath sounds: No wheezing, rhonchi or rales  Abdominal:      General: Bowel sounds are normal  There is no distension  Palpations: Abdomen is soft  Tenderness: There is no abdominal tenderness  There is no rebound  Musculoskeletal:      Cervical back: Normal range of motion and neck supple  Skin:     General: Skin is warm and dry  Neurological:      General: No focal deficit present  Mental Status: He is alert and oriented to person, place, and time  Mental status is at baseline  Psychiatric:         Mood and Affect: Mood normal          Behavior: Behavior normal          Thought Content:  Thought content normal          Judgment: Judgment normal          Medications:    Current Outpatient Medications:   •  acetaminophen (TYLENOL) 325 mg tablet, Take 3 tablets (975 mg total) by mouth every 8 (eight) hours, Disp: , Rfl: 0  •  Alcohol Swabs (B-D SINGLE USE SWABS REGULAR) PADS, USE 2-3 TIMES DAILY AS NEEDED, Disp: , Rfl:   •  Blood Glucose Calibration (OT ULTRA/FASTTK CNTRL SOLN) SOLN, USE AS DIRECTED, Disp: 1 each, Rfl: 0  •  clopidogrel (PLAVIX) 75 mg tablet, TAKE 1 TABLET (75 MG TOTAL) BY MOUTH DAILY, Disp: 90 tablet, Rfl: 3  •  Comfort EZ Pen Needles 32G X 4 MM MISC, USE 3-4 AS DIRECTED, Disp: 100 each, Rfl: 5  •  Continuous Blood Gluc Sensor (FreeStyle Frank 14 Day Sensor) MISC, Use 1 Device 4 (four) times a day, Disp: 1 each, Rfl: 5  •  insulin detemir (Levemir FlexTouch) 100 Units/mL injection pen, Inject 15 Units under the skin daily at bedtime, Disp: 15 mL, Rfl: 3  •  Lancet Devices (Lancing Device) MISC, USE AS DIRECTED, Disp: 1 each, Rfl: 0  •  loperamide (IMODIUM) 2 mg capsule, Take 1 capsule (2 mg total) by mouth 2 (two) times a day as needed for diarrhea Woodall carmen tableta dos veces por cynthia si tiene diarrea, Disp: 90 capsule, Rfl: 0  •  OneTouch Ultra test strip, TEST 3-4 TIMES A DAY, Disp: 100 each, Rfl: 4  •  oxybutynin (DITROPAN) 5 mg tablet, Take 1 tablet (5 mg total) by mouth 2 (two) times a day as needed (bladder spasms), Disp: 60 tablet, Rfl: 0  •  pregabalin (LYRICA) 75 mg capsule, Take 1 capsule (75 mg total) by mouth 2 (two) times a day for 10 days, Disp: 60 capsule, Rfl: 5  •  rosuvastatin (CRESTOR) 40 MG tablet, TAKE ONE (1) TABLET BY MOUTH DAILY, Disp: 30 tablet, Rfl: 5  •  sodium chloride, 10 ML BY INTRACATHETER ROUTE DAILY INTRACATHETER FLUSHING DAILY   MAY SUBSTITUTE PREFILLED SYRINGE WITH NORMAL SALINE 10 ML VIALS, 10 ML SYRI, Disp: , Rfl:   •  tacrolimus (PROGRAF) 1 mg capsule, TAKE 1 CAPSULE (1 MG TOTAL) BY MOUTH EVERY 12 (TWELVE) HOURS, Disp: 180 capsule, Rfl: 3  •  tamsulosin (FLOMAX) 0 4 mg, Take 1 capsule (0 4 mg total) by mouth daily with dinner, Disp: 90 capsule, Rfl: 3  •  temazepam (RESTORIL) 7 5 mg capsule, Take 2 capsules (15 mg total) by mouth daily at bedtime as needed for sleep, Disp: 30 capsule, Rfl: 0  •  zolpidem (AMBIEN) 5 mg tablet, Take 5 mg by mouth daily at bedtime as needed, Disp: , Rfl:   •  Continuous Blood Gluc  (FreeStyle Frank 14 Day [place]) NATALIA, Use 1 Device continuous (Patient not taking: Reported on 12/6/2022), Disp: 1 each, Rfl: 0  •  Incontinence Supply Disposable (Incontinence Brief Medium) MISC, Use 4 (four) times a day, Disp: 120 each, Rfl: 0  •  Nutritional Supplements (Glucerna Shake) LIQD, Take 1 Bottle by mouth 3 (three) times a day, Disp: 00464 mL, Rfl: 0    Current Facility-Administered Medications:   •  lidocaine (URO-JET, GLYDO) 2 % urethral/mucosal gel 1 application, 1 application, Urethral, Daily PRN, Jennifer Reina MD    Laboratory Results:  Lab Results   Component Value Date    WBC 5 69 12/04/2022    HGB 9 1 (L) 12/04/2022    HCT 29 7 (L) 12/04/2022    MCV 78 (L) 12/04/2022     (L) 12/04/2022     Lab Results   Component Value Date    SODIUM 144 12/04/2022    K 3 3 (L) 12/04/2022     (H) 12/04/2022    CO2 24 12/04/2022    BUN 33 (H) 12/04/2022    CREATININE 1 91 (H) 12/04/2022    GLUC 119 12/04/2022    CALCIUM 6 9 (L) 12/04/2022     Lab Results   Component Value Date    CALCIUM 6 9 (L) 12/04/2022    PHOS 2 5 10/13/2022     No results found for: LABPROT

## 2022-12-13 NOTE — PROGRESS NOTES
NEPHROLOGY PROGRESS NOTE    Renee Naranjo 74 y o  male MRN: [id number]  Unit/Bed#:  Encounter: [id number]  Reason for Consult: Chronic kidney disease    The patient is here for follow-up he looks well he is gained a little bit of weight says he is feeling well and eating a lot  He is going through chemotherapy radiation  He recently had cystoscopy done with a right ureteral stent placement  He then went home developed a urinary tract infection and was seen by urology at the hospital   He is now feeling well after antibiotics  His right nephrostomy tube is capped  ASSESSMENT/PLAN:  #1 renal    Patient has chronic kidney disease creatinines had been running in the 2 range and felt given lack of heavy proteinuria it may been due to 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  He was discovered to have right-sided hydronephrosis related to a bladder tumor and now the obstruction is relieved and his creatinine is actually 1 9 which is better than it had been  His blood pressure is excellent we reviewed his medications and overall he is doing well and again renal function is stable  Continue current medications with no changes  #2 bladder cancer    Patient underwent resection of bladder tumor received chemo therapy and radiation  He had cystoscopy done recently and reviewing the urologist note at the time of the operation there was some inflammation but they did not see any evidence of tumor although the urine was cloudy  He is being monitored with serial imaging as there was a small nodule in his lung and we will continue to follow  Labs and follow-up as scheduled and he was asked to call if there is any problems or questions before next visit  SUBJECTIVE:  Review of Systems   Constitutional: Negative for chills, diaphoresis, fever and malaise/fatigue  HENT: Negative  Eyes: Negative  Cardiovascular: Negative    Negative for chest pain, dyspnea on exertion, leg swelling and orthopnea  Respiratory: Negative  Negative for cough, shortness of breath, sputum production and wheezing  Gastrointestinal: Negative for abdominal pain, diarrhea, melena and nausea  Genitourinary: Negative for dysuria, flank pain, hematuria and incomplete emptying  Right percutaneous nephrostomy   Neurological: Negative for dizziness, focal weakness, light-headedness and weakness  Psychiatric/Behavioral: Negative for altered mental status, depression, hallucinations and hypervigilance  OBJECTIVE:  Current Weight:    [email or web address] com:     Blood pressure 124/72, pulse 72  , There is no height or weight on file to calculate BMI  [unfilled]    Physical Exam: /72 (BP Location: Left arm, Patient Position: Sitting, Cuff Size: Standard)   Pulse 72   Physical Exam  Constitutional:       General: He is not in acute distress  Appearance: He is not toxic-appearing or diaphoretic  HENT:      Head: Normocephalic and atraumatic  Nose: Nose normal       Mouth/Throat:      Mouth: Mucous membranes are moist    Eyes:      General: No scleral icterus  Extraocular Movements: Extraocular movements intact  Cardiovascular:      Rate and Rhythm: Normal rate and regular rhythm  Heart sounds: No friction rub  No gallop  Comments: No edema  Pulmonary:      Effort: Pulmonary effort is normal  No respiratory distress  Breath sounds: No wheezing, rhonchi or rales  Abdominal:      General: Bowel sounds are normal  There is no distension  Palpations: Abdomen is soft  Tenderness: There is no abdominal tenderness  There is no rebound  Musculoskeletal:      Cervical back: Normal range of motion and neck supple  Skin:     General: Skin is warm and dry  Neurological:      General: No focal deficit present  Mental Status: He is alert and oriented to person, place, and time  Mental status is at baseline     Psychiatric: Mood and Affect: Mood normal          Behavior: Behavior normal          Thought Content: Thought content normal          Judgment: Judgment normal          Medications:    Current Outpatient Medications:   •  acetaminophen (TYLENOL) 325 mg tablet, Take 3 tablets (975 mg total) by mouth every 8 (eight) hours, Disp: , Rfl: 0  •  Alcohol Swabs (B-D SINGLE USE SWABS REGULAR) PADS, USE 2-3 TIMES DAILY AS NEEDED, Disp: , Rfl:   •  Blood Glucose Calibration (OT ULTRA/FASTTK CNTRL SOLN) SOLN, USE AS DIRECTED, Disp: 1 each, Rfl: 0  •  clopidogrel (PLAVIX) 75 mg tablet, TAKE 1 TABLET (75 MG TOTAL) BY MOUTH DAILY, Disp: 90 tablet, Rfl: 3  •  Comfort EZ Pen Needles 32G X 4 MM MISC, USE 3-4 AS DIRECTED, Disp: 100 each, Rfl: 5  •  Continuous Blood Gluc Sensor (FreeStyle Frank 14 Day Sensor) MISC, Use 1 Device 4 (four) times a day, Disp: 1 each, Rfl: 5  •  insulin detemir (Levemir FlexTouch) 100 Units/mL injection pen, Inject 15 Units under the skin daily at bedtime, Disp: 15 mL, Rfl: 3  •  Lancet Devices (Lancing Device) MISC, USE AS DIRECTED, Disp: 1 each, Rfl: 0  •  loperamide (IMODIUM) 2 mg capsule, Take 1 capsule (2 mg total) by mouth 2 (two) times a day as needed for diarrhea [place] carmen tableta dos veces por cynthia si tiene diarrea, Disp: 90 capsule, Rfl: 0  •  OneTouch Ultra test strip, TEST 3-4 TIMES A DAY, Disp: 100 each, Rfl: 4  •  oxybutynin (DITROPAN) 5 mg tablet, Take 1 tablet (5 mg total) by mouth 2 (two) times a day as needed (bladder spasms), Disp: 60 tablet, Rfl: 0  •  pregabalin (LYRICA) 75 mg capsule, Take 1 capsule (75 mg total) by mouth 2 (two) times a day for 10 days, Disp: 60 capsule, Rfl: 5  •  rosuvastatin (CRESTOR) 40 MG tablet, TAKE ONE (1) TABLET BY MOUTH DAILY, Disp: 30 tablet, Rfl: 5  •  sodium chloride, 10 ML BY INTRACATHETER ROUTE DAILY INTRACATHETER FLUSHING DAILY   MAY SUBSTITUTE PREFILLED SYRINGE WITH NORMAL SALINE 10 ML VIALS, 10 ML SYRI, Disp: , Rfl:   •  tacrolimus (PROGRAF) 1 mg capsule, TAKE 1 CAPSULE (1 MG TOTAL) BY MOUTH EVERY 12 (TWELVE) HOURS, Disp: 180 capsule, Rfl: 3  •  tamsulosin (FLOMAX) 0 4 mg, Take 1 capsule (0 4 mg total) by mouth daily with dinner, Disp: 90 capsule, Rfl: 3  •  temazepam (RESTORIL) 7 5 mg capsule, Take 2 capsules (15 mg total) by mouth daily at bedtime as needed for sleep, Disp: 30 capsule, Rfl: 0  •  zolpidem (AMBIEN) 5 mg tablet, Take 5 mg by mouth daily at bedtime as needed, Disp: , Rfl:   •  Continuous Blood Gluc  (Livemape 14 Day [place]) NATALIA, Use 1 Device continuous (Patient not taking: Reported on 12/6/2022), Disp: 1 each, Rfl: 0  •  Incontinence Supply Disposable (Incontinence Brief Medium) MISC, Use 4 (four) times a day, Disp: 120 each, Rfl: 0  •  Nutritional Supplements (Glucerna Shake) LIQD, Take 1 Bottle by mouth 3 (three) times a day, Disp: 39781 mL, Rfl: 0    Current Facility-Administered Medications:   •  lidocaine (URO-JET, GLYDO) 2 % urethral/mucosal gel 1 application, 1 application, Urethral, Daily PRN, Shyla Padron MD    Laboratory Results:  Lab Results   Component Value Date    WBC 5 69 12/04/2022    HGB 9 1 (L) 12/04/2022    HCT 29 7 (L) 12/04/2022    MCV 78 (L) 12/04/2022     (L) 12/04/2022     Lab Results   Component Value Date    SODIUM 144 12/04/2022    K 3 3 (L) 12/04/2022     (H) 12/04/2022    CO2 24 12/04/2022    BUN 33 (H) 12/04/2022    CREATININE 1 91 (H) 12/04/2022    GLUC 119 12/04/2022    CALCIUM 6 9 (L) 12/04/2022     Lab Results   Component Value Date    CALCIUM 6 9 (L) 12/04/2022    PHOS 2 5 10/13/2022     No results found for: LABPROT

## 2022-12-15 ENCOUNTER — HOME CARE VISIT (OUTPATIENT)
Dept: HOME HEALTH SERVICES | Facility: HOME HEALTHCARE | Age: 72
End: 2022-12-15

## 2022-12-15 VITALS
TEMPERATURE: 97.2 F | DIASTOLIC BLOOD PRESSURE: 62 MMHG | HEART RATE: 72 BPM | SYSTOLIC BLOOD PRESSURE: 128 MMHG | RESPIRATION RATE: 20 BRPM

## 2022-12-20 ENCOUNTER — HOME CARE VISIT (OUTPATIENT)
Dept: HOME HEALTH SERVICES | Facility: HOME HEALTHCARE | Age: 72
End: 2022-12-20

## 2022-12-20 VITALS
TEMPERATURE: 97.4 F | SYSTOLIC BLOOD PRESSURE: 148 MMHG | RESPIRATION RATE: 18 BRPM | OXYGEN SATURATION: 100 % | DIASTOLIC BLOOD PRESSURE: 74 MMHG | HEART RATE: 76 BPM

## 2022-12-23 ENCOUNTER — PATIENT OUTREACH (OUTPATIENT)
Dept: CASE MANAGEMENT | Facility: OTHER | Age: 72
End: 2022-12-23

## 2022-12-23 NOTE — PROGRESS NOTES
OSW called Jo Barth today for support  She talked about Rogers's recent hospitalization for an infection in the beginning of December  He then returned home with [facility] for therapy and skilled nursing  She is reporting he is doing very well now  He is eating, drinking and stronger  She reminds him he is going for additional procedures in January with the urologist and encourages him to stay as healthy as he can  She has not completed the [facility] application at this time  She stated she is waiting until after the New Year  The hardest financial stressor for her is the oil bill, because Donnie Hogan is "always cold" and turns the heat up to 75 or 80 degrees  Encouraged her to apply for the [facility] program which may help alleviate some of that financial burden  Offered continued monthly calls and she is appreciative  Also encouraged her to reach out as needed

## 2022-12-30 ENCOUNTER — TELEPHONE (OUTPATIENT)
Dept: HEMATOLOGY ONCOLOGY | Facility: CLINIC | Age: 72
End: 2022-12-30

## 2022-12-30 ENCOUNTER — TELEPHONE (OUTPATIENT)
Dept: UROLOGY | Facility: MEDICAL CENTER | Age: 72
End: 2022-12-30

## 2022-12-30 NOTE — TELEPHONE ENCOUNTER
CALL RETURN FORM   Reason for patient call? Patients significant other Gaile Range calling to request assistance with scheduling patient for lab draw at the infusion center   Patient's primary oncologist? Dr Shantal Sauer    Name of person the patient was calling for? Dr Chiquita Greenfield team   Any additional information to add, if applicable? Flower Novak is on communication consent form    Patient has port   Informed patient that the message will be forwarded to the team and someone will get back to them as soon as possible    Did you relay this information to the patient?  Yes

## 2023-01-01 ENCOUNTER — DOCUMENTATION (OUTPATIENT)
Dept: UROLOGY | Facility: CLINIC | Age: 73
End: 2023-01-01

## 2023-01-01 ENCOUNTER — HOSPICE ADMISSION (OUTPATIENT)
Dept: HOME HOSPICE | Facility: HOSPICE | Age: 73
End: 2023-01-01
Payer: MEDICARE

## 2023-01-01 ENCOUNTER — PATIENT OUTREACH (OUTPATIENT)
Dept: CASE MANAGEMENT | Facility: HOSPITAL | Age: 73
End: 2023-01-01

## 2023-01-01 ENCOUNTER — TELEPHONE (OUTPATIENT)
Age: 73
End: 2023-01-01

## 2023-01-06 ENCOUNTER — RA CDI HCC (OUTPATIENT)
Dept: OTHER | Facility: HOSPITAL | Age: 73
End: 2023-01-06

## 2023-01-06 NOTE — PROGRESS NOTES
[address]  coding opportunities     [id number]     Chart Reviewed number of suggestions sent to Provider: 1     Patients Insurance     Medicare Insurance: [facility]

## 2023-01-10 ENCOUNTER — OFFICE VISIT (OUTPATIENT)
Dept: UROLOGY | Facility: CLINIC | Age: 73
End: 2023-01-10

## 2023-01-10 VITALS
HEIGHT: 63 IN | BODY MASS INDEX: 21.97 KG/M2 | WEIGHT: 124 LBS | HEART RATE: 92 BPM | SYSTOLIC BLOOD PRESSURE: 120 MMHG | DIASTOLIC BLOOD PRESSURE: 66 MMHG

## 2023-01-10 DIAGNOSIS — Z01.811 PRE-OP CHEST EXAM: Primary | ICD-10-CM

## 2023-01-10 NOTE — H&P (VIEW-ONLY)
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, completed radiation 2021  2  Right ureteral obstruction- PCN -->PCNU, now capped  Next plan is attempt internalization with ureteral stent upcoming procedure  3  Distal urethral stricture- cysto dilation November 2022 in office    History and physical was performed for the patients upcoming cystoscopy retrograde pyelogram ureteral stent insertion, removal of nephroureteral tube, scheduled for 1/18/22 with Dr Margaret Raman  All questions and concerns regarding surgery and perioperative expectations have been addressed and answered  No overall changes in their health since last visit, denies any prior complications with anesthesia  Will proceed with surgery as planned pending further medical clearance with PAT tomorrow and PCP visit next week  Informed consent signed today  [facility]- sent today  PAT 1/11  PCP 1/12  OR 1/18    History of Present Illness  Madeleine Nguyen is a 74 y o  male here with his daughter for history and physical prior to their upcoming surgery  He actually feels good and strong and eager for surgery  He understands the need for repeat procedures every 3-6 months lifelong to exchange his stent after this  He understands the 50/50 possibility of urethral catheter after surgery next week but hopefully will not require  He has had no fevers hematuria dysuria or other voiding issues with existing NU tube      Urologic history as below: Muscle invasive bladder cancer status post aborted chemotherapy and completed radiation in 2021, right ureteral obstruction from tumor, with a currently capped right nephroureteral tube with no bleeding or infectious issues since Trial   Cystoscopy in November shows distal urethral stricture passable with the scope, plans to attempt internalization of his nephroureteral tube to a ureteral stent next  Review of Systems   Constitutional: Negative  Negative for activity change, appetite change, chills, fever and unexpected weight change  HENT: Negative  Respiratory: Negative  Negative for shortness of breath  Cardiovascular: Negative  Negative for chest pain  Gastrointestinal: Negative for abdominal pain, diarrhea, nausea and vomiting  Endocrine: Negative  Genitourinary: Negative for decreased urine volume, difficulty urinating, dysuria, flank pain, frequency, hematuria and urgency  Musculoskeletal: Negative  Negative for back pain and gait problem  Skin: Negative  Allergic/Immunologic: Negative  Neurological: Negative  Hematological: Negative for adenopathy  Does not bruise/bleed easily  Vitals  Vitals:    01/10/23 1030   BP: 120/66   BP Location: Right arm   Patient Position: Sitting   Cuff Size: Adult   Pulse: 92   Weight: 56 2 kg (124 lb)   Height: 5' 3" (1 6 m)       Physical Exam  Vitals and nursing note reviewed  Constitutional:       General: He is not in acute distress  Appearance: Normal appearance  He is well-developed  He is not ill-appearing or diaphoretic  HENT:      Head: Normocephalic and atraumatic  Cardiovascular:      Rate and Rhythm: Normal rate and regular rhythm  Pulses: Normal pulses  Heart sounds: Normal heart sounds  Pulmonary:      Effort: Pulmonary effort is normal       Breath sounds: Normal breath sounds  Abdominal:      General: Abdomen is flat  Palpations: Abdomen is soft  Comments: Right percutaneous catheter capped  Surrounding skin without infection or edema  Flushes easily (daily) per daughter  Musculoskeletal:         General: Normal range of motion  Right lower leg: No edema  Left lower leg: No edema  Skin:     General: Skin is warm  Coloration: Skin is not pale  Findings: No rash     Neurological:      General: No focal deficit present  Mental Status: He is alert and oriented to person, place, and time  Mental status is at baseline        Gait: Gait normal    Psychiatric:         Mood and Affect: Mood normal              Past Medical History  Past Medical History:   Diagnosis Date   • Acute urinary retention 9/12/2022   • Alcoholism ([facility] [address] )    • Anemia    • Bladder cancer ([facility] )    • Cerebral artery aneurysm    • Change in mental state     last assessed 5/18/15; resolved 10/27/15   • Diabetes mellitus ([facility] [address] )    • Drug dependence ([facility] [address] )    • Fatigue     last assessed 1/26/15; resolved 5/24/16   • Hepatitis C    • Hospital discharge follow-up 12/21/2018   • Hx of liver transplant [facility])    • Hydronephrosis of right kidney    • Kidney disease    • 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[facility] [address] )    • Liver cirrhosis ([facility] [address] )    • Liver transplant recipient [facility])    • [facility]  )    • Renal disorder    • Stroke (cerebrum) ([facility] [address] )    • Stroke ([facility] [address] )    • Subdural hygroma     2/27/14; resolved 7/28/15   • Thrombocytopenia ([facility] [address] )    • Thrombocytopenia ([facility] [address] ) 9/20/2017       Past Social History  Past Surgical History:   Procedure Laterality Date   • BRAIN SURGERY  02/12/2014    left frontotemporal 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   • CATARACT EXTRACTION     • CHOLECYSTECTOMY     • CYSTOSCOPY  11/30/2022    [place]   • FL LUMBAR PUNCTURE DIAGNOSTIC  12/14/2018   • HEPATITIS B SURFACE AB QN,LIVER TRANSPLANT (HISTORICAL)     • IR NEPHROSTOMY TUBE CHECK/CHANGE/REPOSITION/REINSERTION/UPSIZE  07/29/2022   • IR NEPHROSTOMY TUBE CHECK/CHANGE/REPOSITION/REINSERTION/UPSIZE  08/31/2022   • IR NEPHROSTOMY TUBE CHECK/CHANGE/REPOSITION/REINSERTION/UPSIZE  10/07/2022   • IR NEPHROSTOMY TUBE CHECK/CHANGE/REPOSITION/REINSERTION/UPSIZE  12/3/2022   • IR NEPHROSTOMY TUBE PLACEMENT  05/28/2022   • IR PICC LINE  12/14/2018   • IR PORT PLACEMENT  06/23/2022   • LIVER TRANSPLANTATION     • ROTATOR CUFF REPAIR     • SHOULDER SURGERY     • TRANSURETHRAL RESECTION OF BLADDER TUMOR N/A 05/26/2022    Procedure: TRANSURETHRAL RESECTION OF BLADDER TUMOR (TURBT); Surgeon: Uli Fam MD;  Location: [facility];  Service: Urology       Past Family History  Family History   Problem Relation Age of Onset   • Hypertension Mother         benign essential    • Lung cancer Father    • Diabetes Sister    • Cancer Brother    • Stroke Other         cva - due to embolism of cerebra artery        Past Social history  Social History     Socioeconomic History   • Marital status: Single     Spouse name: Not on file   • Number of children: Not on file   • Years of education: less then high school   • Highest education level: Not on file   Occupational History   • Occupation: physical disability    Tobacco Use   • Smoking status: Former     Packs/day: 1 00     Types: Cigarettes   • Smokeless tobacco: Never   • Tobacco comments:     former smoker per allscripts    Vaping Use   • Vaping Use: Never used   Substance and Sexual Activity   • Alcohol use: Not Currently   • Drug use: No     Comment: remotely quit drug use per allscripts    • Sexual activity: Not Currently   Other Topics Concern   • Not on file   Social History Narrative    ** Merged History Encounter **         ** Merged History Encounter **         Caffeine use   Living with significant other , girlfriend and daughter      Social Determinants of Health     Financial Resource Strain: Not on file   Food Insecurity: No Food Insecurity   • Worried About Running Out of Food in the Last Year: Never true   • Ran Out of Food in the Last Year: Never true   Transportation Needs: No Transportation Needs   • Lack of Transportation (Medical): No   • Lack of Transportation (Non-Medical):  No   Physical Activity: Not on file   Stress: Not on file   Social Connections: Not on file   Intimate Partner Violence: Not on file   Housing Stability: Low Risk    • Unable to Pay for Housing in the Last Year: No   • Number of Places Lived in the Last Year: 1   • Unstable Housing in the Last Year: No       Current Medications  Current Outpatient Medications   Medication Sig Dispense Refill   • acetaminophen (TYLENOL) 325 mg tablet Take 3 tablets (975 mg total) by mouth every 8 (eight) hours  0   • Alcohol Swabs (B-D SINGLE USE SWABS REGULAR) PADS USE 2-3 TIMES DAILY AS NEEDED     • Blood Glucose Calibration (OT ULTRA/FASTTK CNTRL SOLN) SOLN USE AS DIRECTED 1 each 0   • clopidogrel (PLAVIX) 75 mg tablet TAKE 1 TABLET (75 MG TOTAL) BY MOUTH DAILY 90 tablet 3   • Comfort EZ Pen Needles 32G X 4 MM MISC USE 3-4 AS DIRECTED 100 each 5   • Continuous Blood Gluc Sensor (FreeStyle Frank 14 Day Sensor) MISC Use 1 Device 4 (four) times a day 1 each 5   • insulin detemir (Levemir FlexTouch) 100 Units/mL injection pen Inject 15 Units under the skin daily at bedtime 15 mL 3   • Lancet Devices (Lancing Device) MISC USE AS DIRECTED 1 each 0   • loperamide (IMODIUM) 2 mg capsule Take 1 capsule (2 mg total) by mouth 2 (two) times a day as needed for diarrhea [place] carmen tableta dos veces por cynthia si tiene diarrea 90 capsule 0   • Nutritional Supplements (Glucerna Shake) LIQD Take 1 Bottle by mouth 3 (three) times a day 68714 mL 0   • OneTouch Ultra test strip TEST 3-4 TIMES A  each 4   • oxybutynin (DITROPAN) 5 mg tablet Take 1 tablet (5 mg total) by mouth 2 (two) times a day as needed (bladder spasms) 60 tablet 0   • pregabalin (LYRICA) 75 mg capsule Take 1 capsule (75 mg total) by mouth 2 (two) times a day for 10 days 60 capsule 5   • rosuvastatin (CRESTOR) 40 MG tablet TAKE ONE (1) TABLET BY MOUTH DAILY 30 tablet 5   • tacrolimus (PROGRAF) 1 mg capsule TAKE 1 CAPSULE (1 MG TOTAL) BY MOUTH EVERY 12 (TWELVE) HOURS 180 capsule 3   • tamsulosin (FLOMAX) 0 4 mg Take 1 capsule (0 4 mg total) by mouth daily with dinner 90 capsule 3   • temazepam (RESTORIL) 7 5 mg capsule TAKE 2 CAPSULES (15 MG TOTAL) BY MOUTH DAILY AT BEDTIME AS NEEDED FOR SLEEP 30 capsule 3   • zolpidem (AMBIEN) 5 mg tablet Take 5 mg by mouth daily at bedtime as needed     • Continuous Blood Gluc  (FreeStyle Frank 14 Day [place]) NATALIA Use 1 Device continuous (Patient not taking: Reported on 12/6/2022) 1 each 0   • Incontinence Supply Disposable (Incontinence Brief Medium) MISC Use 4 (four) times a day 120 each 0   • sodium chloride 10 ML BY INTRACATHETER ROUTE DAILY INTRACATHETER FLUSHING DAILY  MAY SUBSTITUTE PREFILLED SYRINGE WITH NORMAL SALINE 10 ML VIALS, 10 ML SYRI (Patient not taking: Reported on 1/10/2023)       Current Facility-Administered Medications   Medication Dose Route Frequency Provider Last Rate Last Admin   • lidocaine (URO-JET, GLYDO) 2 % urethral/mucosal gel 1 application  1 application Urethral Daily PRN Marcia Joya MD           Allergies  Allergies   Allergen Reactions   • Tequin [Gatifloxacin] Rash   • Lipitor [Atorvastatin] Other (See Comments)     Rash and itching   • Ciprofloxacin    • Ciprofloxacin Rash   • Iohexol Other (See Comments)     Unknown  • Iv Contrast [Iodinated Contrast Media]    • Iv Contrast [Iodinated Contrast Media] Other (See Comments)     Unknown   • Levofloxacin Rash   • Levofloxacin Other (See Comments)     Unknown  • Lipitor [Atorvastatin] Rash     Rash and itching   • Omnipaque [Iohexol]          Past Medical History, Social History, Family History, medications and allergies were reviewed

## 2023-01-10 NOTE — PROGRESS NOTES
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, completed radiation 2021  2  Right ureteral obstruction- PCN -->PCNU, now capped  Next plan is attempt internalization with ureteral stent upcoming procedure  3  Distal urethral stricture- cysto dilation November 2022 in office    History and physical was performed for the patients upcoming cystoscopy retrograde pyelogram ureteral stent insertion, removal of nephroureteral tube, scheduled for 1/18/22 with Dr Gwen Shin  All questions and concerns regarding surgery and perioperative expectations have been addressed and answered  No overall changes in their health since last visit, denies any prior complications with anesthesia  Will proceed with surgery as planned pending further medical clearance with PAT tomorrow and PCP visit next week  Informed consent signed today  [facility]- sent today  PAT 1/11  PCP 1/12  OR 1/18    History of Present Illness  Juan Robin is a 74 y o  male here with his daughter for history and physical prior to their upcoming surgery  He actually feels good and strong and eager for surgery  He understands the need for repeat procedures every 3-6 months lifelong to exchange his stent after this  He understands the 50/50 possibility of urethral catheter after surgery next week but hopefully will not require  He has had no fevers hematuria dysuria or other voiding issues with existing NU tube      Urologic history as below: Muscle invasive bladder cancer status post aborted chemotherapy and completed radiation in 2021, right ureteral obstruction from tumor, with a currently capped right nephroureteral tube with no bleeding or infectious issues since Trial   Cystoscopy in November shows distal urethral stricture passable with the scope, plans to attempt internalization of his nephroureteral tube to a ureteral stent next  Review of Systems   Constitutional: Negative  Negative for activity change, appetite change, chills, fever and unexpected weight change  HENT: Negative  Respiratory: Negative  Negative for shortness of breath  Cardiovascular: Negative  Negative for chest pain  Gastrointestinal: Negative for abdominal pain, diarrhea, nausea and vomiting  Endocrine: Negative  Genitourinary: Negative for decreased urine volume, difficulty urinating, dysuria, flank pain, frequency, hematuria and urgency  Musculoskeletal: Negative  Negative for back pain and gait problem  Skin: Negative  Allergic/Immunologic: Negative  Neurological: Negative  Hematological: Negative for adenopathy  Does not bruise/bleed easily  Vitals  Vitals:    01/10/23 1030   BP: 120/66   BP Location: Right arm   Patient Position: Sitting   Cuff Size: Adult   Pulse: 92   Weight: 56 2 kg (124 lb)   Height: 5' 3" (1 6 m)       Physical Exam  Vitals and nursing note reviewed  Constitutional:       General: He is not in acute distress  Appearance: Normal appearance  He is well-developed  He is not ill-appearing or diaphoretic  HENT:      Head: Normocephalic and atraumatic  Cardiovascular:      Rate and Rhythm: Normal rate and regular rhythm  Pulses: Normal pulses  Heart sounds: Normal heart sounds  Pulmonary:      Effort: Pulmonary effort is normal       Breath sounds: Normal breath sounds  Abdominal:      General: Abdomen is flat  Palpations: Abdomen is soft  Comments: Right percutaneous catheter capped  Surrounding skin without infection or edema  Flushes easily (daily) per daughter  Musculoskeletal:         General: Normal range of motion  Right lower leg: No edema  Left lower leg: No edema  Skin:     General: Skin is warm  Coloration: Skin is not pale  Findings: No rash     Neurological:      General: No focal deficit present  Mental Status: He is alert and oriented to person, place, and time  Mental status is at baseline        Gait: Gait normal    Psychiatric:         Mood and Affect: Mood normal              Past Medical History  Past Medical History:   Diagnosis Date   • Acute urinary retention 9/12/2022   • Alcoholism ([facility] )    • Anemia    • Bladder cancer ([facility] )    • Cerebral artery aneurysm    • Change in mental state     last assessed 5/18/15; resolved 10/27/15   • Diabetes mellitus ([facility] [address] )    • Drug dependence ([facility] [address] )    • Fatigue     last assessed 1/26/15; resolved 5/24/16   • Hepatitis C    • Hospital discharge follow-up 12/21/2018   • Hx of liver transplant [facility])    • Hydronephrosis of right kidney    • Kidney disease    • 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[facility] [address] [facility] )    • Liver cirrhosis ([facility] [address] )    • Liver transplant recipient [facility])    • Liver transplant status ([facility]  )    • Renal disorder    • Stroke (cerebrum) ([facility] [facility] )    • Stroke ([facility] [address] [facility] )    • Subdural hygroma     2/27/14; resolved 7/28/15   • Thrombocytopenia ([facility] [address] )    • Thrombocytopenia ([facility] [address] ) 9/20/2017       Past Social History  Past Surgical History:   Procedure Laterality Date   • BRAIN SURGERY  02/12/2014    left frontotemporal 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   • CATARACT EXTRACTION     • CHOLECYSTECTOMY     • CYSTOSCOPY  11/30/2022    [place]   • FL LUMBAR PUNCTURE DIAGNOSTIC  12/14/2018   • HEPATITIS B SURFACE AB QN,LIVER TRANSPLANT (HISTORICAL)     • IR NEPHROSTOMY TUBE CHECK/CHANGE/REPOSITION/REINSERTION/UPSIZE  07/29/2022   • IR NEPHROSTOMY TUBE CHECK/CHANGE/REPOSITION/REINSERTION/UPSIZE  08/31/2022   • IR NEPHROSTOMY TUBE CHECK/CHANGE/REPOSITION/REINSERTION/UPSIZE  10/07/2022   • IR NEPHROSTOMY TUBE CHECK/CHANGE/REPOSITION/REINSERTION/UPSIZE  12/3/2022   • IR NEPHROSTOMY TUBE PLACEMENT  05/28/2022   • IR PICC LINE  12/14/2018   • IR PORT PLACEMENT  06/23/2022   • LIVER TRANSPLANTATION     • ROTATOR CUFF REPAIR     • SHOULDER SURGERY     • TRANSURETHRAL RESECTION OF BLADDER TUMOR N/A 05/26/2022    Procedure: TRANSURETHRAL RESECTION OF BLADDER TUMOR (TURBT); Surgeon: Simona Amin MD;  Location: [facility];  Service: Urology       Past Family History  Family History   Problem Relation Age of Onset   • Hypertension Mother         benign essential    • Lung cancer Father    • Diabetes Sister    • Cancer Brother    • Stroke Other         cva - due to embolism of cerebra artery        Past Social history  Social History     Socioeconomic History   • Marital status: Single     Spouse name: Not on file   • Number of children: Not on file   • Years of education: less then high school   • Highest education level: Not on file   Occupational History   • Occupation: physical disability    Tobacco Use   • Smoking status: Former     Packs/day: 1 00     Types: Cigarettes   • Smokeless tobacco: Never   • Tobacco comments:     former smoker per allscripts    Vaping Use   • Vaping Use: Never used   Substance and Sexual Activity   • Alcohol use: Not Currently   • Drug use: No     Comment: remotely quit drug use per allscripts    • Sexual activity: Not Currently   Other Topics Concern   • Not on file   Social History Narrative    ** Merged History Encounter **         ** Merged History Encounter **         Caffeine use   Living with significant other , girlfriend and daughter      Social Determinants of Health     Financial Resource Strain: Not on file   Food Insecurity: No Food Insecurity   • Worried About Running Out of Food in the Last Year: Never true   • Ran Out of Food in the Last Year: Never true   Transportation Needs: No Transportation Needs   • Lack of Transportation (Medical): No   • Lack of Transportation (Non-Medical):  No   Physical Activity: Not on file   Stress: Not on file   Social Connections: Not on file   Intimate Partner Violence: Not on file   Housing Stability: Low Risk    • Unable to Pay for Housing in the Last Year: No   • Number of Places Lived in the Last Year: 1   • Unstable Housing in the Last Year: No       Current Medications  Current Outpatient Medications   Medication Sig Dispense Refill   • acetaminophen (TYLENOL) 325 mg tablet Take 3 tablets (975 mg total) by mouth every 8 (eight) hours  0   • Alcohol Swabs (B-D SINGLE USE SWABS REGULAR) PADS USE 2-3 TIMES DAILY AS NEEDED     • Blood Glucose Calibration (OT ULTRA/FASTTK CNTRL SOLN) SOLN USE AS DIRECTED 1 each 0   • clopidogrel (PLAVIX) 75 mg tablet TAKE 1 TABLET (75 MG TOTAL) BY MOUTH DAILY 90 tablet 3   • Comfort EZ Pen Needles 32G X 4 MM MISC USE 3-4 AS DIRECTED 100 each 5   • Continuous Blood Gluc Sensor (FreeStyle Frank 14 Day Sensor) MISC Use 1 Device 4 (four) times a day 1 each 5   • insulin detemir (Levemir FlexTouch) 100 Units/mL injection pen Inject 15 Units under the skin daily at bedtime 15 mL 3   • Lancet Devices (Lancing Device) MISC USE AS DIRECTED 1 each 0   • loperamide (IMODIUM) 2 mg capsule Take 1 capsule (2 mg total) by mouth 2 (two) times a day as needed for diarrhea [place] carmen tableta dos veces por cynthia si tiene diarrea 90 capsule 0   • Nutritional Supplements (Glucerna Shake) LIQD Take 1 Bottle by mouth 3 (three) times a day 97649 mL 0   • OneTouch Ultra test strip TEST 3-4 TIMES A  each 4   • oxybutynin (DITROPAN) 5 mg tablet Take 1 tablet (5 mg total) by mouth 2 (two) times a day as needed (bladder spasms) 60 tablet 0   • pregabalin (LYRICA) 75 mg capsule Take 1 capsule (75 mg total) by mouth 2 (two) times a day for 10 days 60 capsule 5   • rosuvastatin (CRESTOR) 40 MG tablet TAKE ONE (1) TABLET BY MOUTH DAILY 30 tablet 5   • tacrolimus (PROGRAF) 1 mg capsule TAKE 1 CAPSULE (1 MG TOTAL) BY MOUTH EVERY 12 (TWELVE) HOURS 180 capsule 3   • tamsulosin (FLOMAX) 0 4 mg Take 1 capsule (0 4 mg total) by mouth daily with dinner 90 capsule 3   • temazepam (RESTORIL) 7 5 mg capsule TAKE 2 CAPSULES (15 MG TOTAL) BY MOUTH DAILY AT BEDTIME AS NEEDED FOR SLEEP 30 capsule 3   • zolpidem (AMBIEN) 5 mg tablet Take 5 mg by mouth daily at bedtime as needed     • Continuous Blood Gluc  (FreeStyle Frank 14 Day [place]) NATALIA Use 1 Device continuous (Patient not taking: Reported on 12/6/2022) 1 each 0   • Incontinence Supply Disposable (Incontinence Brief Medium) MISC Use 4 (four) times a day 120 each 0   • sodium chloride 10 ML BY INTRACATHETER ROUTE DAILY INTRACATHETER FLUSHING DAILY  MAY SUBSTITUTE PREFILLED SYRINGE WITH NORMAL SALINE 10 ML VIALS, 10 ML SYRI (Patient not taking: Reported on 1/10/2023)       Current Facility-Administered Medications   Medication Dose Route Frequency Provider Last Rate Last Admin   • lidocaine (URO-JET, GLYDO) 2 % urethral/mucosal gel 1 application  1 application Urethral Daily PRN Willie Zaman MD           Allergies  Allergies   Allergen Reactions   • Tequin [Gatifloxacin] Rash   • Lipitor [Atorvastatin] Other (See Comments)     Rash and itching   • Ciprofloxacin    • Ciprofloxacin Rash   • Iohexol Other (See Comments)     Unknown  • Iv Contrast [Iodinated Contrast Media]    • Iv Contrast [Iodinated Contrast Media] Other (See Comments)     Unknown   • Levofloxacin Rash   • Levofloxacin Other (See Comments)     Unknown  • Lipitor [Atorvastatin] Rash     Rash and itching   • Omnipaque [Iohexol]          Past Medical History, Social History, Family History, medications and allergies were reviewed

## 2023-01-11 ENCOUNTER — ANESTHESIA EVENT (OUTPATIENT)
Dept: PERIOP | Facility: HOSPITAL | Age: 73
End: 2023-01-11

## 2023-01-11 ENCOUNTER — HOSPITAL ENCOUNTER (OUTPATIENT)
Dept: INFUSION CENTER | Facility: HOSPITAL | Age: 73
Discharge: HOME | End: 2023-01-11

## 2023-01-11 DIAGNOSIS — Z95.828 PORT-A-CATH IN PLACE: Primary | ICD-10-CM

## 2023-01-11 NOTE — PROGRESS NOTES
Pt port accessed and de-accessed for routine flush without complications  New appts made and pt is aware

## 2023-01-11 NOTE — PRE-PROCEDURE INSTRUCTIONS
Pre-Surgery Instructions:   Medication Instructions   • acetaminophen (TYLENOL) 325 mg tablet Uses PRN- OK to take day of surgery   • clopidogrel (PLAVIX) 75 mg tablet Instructions provided by MD   • insulin detemir (Levemir FlexTouch) 100 Units/mL injection pen Hold day of surgery  • loperamide (IMODIUM) 2 mg capsule Uses PRN- OK to take day of surgery   • Nutritional Supplements (Glucerna Shake) LIQD Hold day of surgery  • oxybutynin (DITROPAN) 5 mg tablet Hold day of surgery  • pregabalin (LYRICA) 75 mg capsule Take day of surgery  • rosuvastatin (CRESTOR) 40 MG tablet Hold day of surgery  • tacrolimus (PROGRAF) 1 mg capsule Instructions provided by MD   • tamsulosin (FLOMAX) 0 4 mg Hold day of surgery  • temazepam (RESTORIL) 7 5 mg capsule Take night before surgery   • zolpidem (AMBIEN) 5 mg tablet Take night before surgery        NPO after midnight, meds in am with sips of water  Understands when to expect preop phone call  Remove all jewelry  Advised of visitation policy  Before your operation, you play an important role in decreasing your risk for infection by washing with special antiseptic soap  This is an effective way to reduce bacteria on the skin which may help to prevent infections at the surgical site  Please read the following directions in advance  1  In the week before your operation purchase a 4 ounce bottle of antiseptic soap containing chlorhexidine gluconate 4%  Some brand names include: Aplicare, Endure, and Hibiclens  The cost is usually less than $5 00  · For your convenience, the [address] [place] [address] carries the soap  · It may also be available at your doctor's office or pre-admission testing center, and at most retail pharmacies  · If you are allergic or sensitive to soaps containing chlorhexidine gluconate (CHG), please let your doctor know so another antiseptic soap can be suggested  · CHG antiseptic soap is for external use only    2      The day before your operation follow these directions carefully to get ready  · Place clean lines (sheets) on your bed; you should sleep on clean sheets after your evening shower  · Get clean towels and washcloths ready - you need enough for 2 showers  · Set aside clean underwear, pajamas, and clothing to wear after the shower  Reminders:  · DO NOT use any other soap or body rinse on your skin during or after the antiseptic showers  · DO NOT use lotion , powder, deodorant, or perfume/aftershave of any kind on your skin after your antiseptic shower  · DO NOT shave any body parts in the 24 hours/the day before your operation  · DO NOT get the antiseptic soap in your eyes, ears, nose, mouth, or vaginal area  3      You will need to shower the night before AND the morning of your Surgery  Shower 1:  · The evening before your operation, take the fist shower  · First, shampoo your hair with regular shampoo and rinse it completely before you use the anitseptic soap  After washing and rinsing your hair, rinse your body  · Next, use a clean wash cloth to apply the antiseptic soap and wash your body from the neck down to your toes using 1/2 bottle of the antiseptic soap  You will use the other 1/2 bottle for the second shower  · Clean the area where your incision will be; later this area well for about 2 minutes  · If you ar having head or neck surgery, wash areas with the antiseptic soap  · Rinse yourself completely with running water  · Use a clean towel to dry off  · Wear clean underwear and clothing/pajamas  Shower 2:  · The Morning of your operation, take the second shower following the same steps as Shower 1 using the second 1/2 of the bottle of antiseptic soap  · Use clean cloths and towels to was and dry yourself off  · Wear clean underwear and clothing

## 2023-01-12 ENCOUNTER — OFFICE VISIT (OUTPATIENT)
Dept: FAMILY MEDICINE CLINIC | Facility: CLINIC | Age: 73
End: 2023-01-12

## 2023-01-12 VITALS
HEIGHT: 63 IN | WEIGHT: 124 LBS | SYSTOLIC BLOOD PRESSURE: 132 MMHG | DIASTOLIC BLOOD PRESSURE: 64 MMHG | HEART RATE: 63 BPM | RESPIRATION RATE: 17 BRPM | OXYGEN SATURATION: 100 % | BODY MASS INDEX: 21.97 KG/M2 | TEMPERATURE: 96.7 F

## 2023-01-12 DIAGNOSIS — E11.51 TYPE 2 DIABETES MELLITUS WITH DIABETIC PERIPHERAL ANGIOPATHY WITHOUT GANGRENE, WITH LONG-TERM CURRENT USE OF INSULIN (HCC): ICD-10-CM

## 2023-01-12 DIAGNOSIS — C67.0 MALIGNANT NEOPLASM OF TRIGONE OF URINARY BLADDER (HCC): ICD-10-CM

## 2023-01-12 DIAGNOSIS — Z93.6 OTHER ARTIFICIAL OPENINGS OF URINARY TRACT STATUS (HCC): ICD-10-CM

## 2023-01-12 DIAGNOSIS — Z01.818 PRE-OPERATIVE CLEARANCE: Primary | ICD-10-CM

## 2023-01-12 DIAGNOSIS — Z94.4 LIVER TRANSPLANT STATUS (HCC): ICD-10-CM

## 2023-01-12 DIAGNOSIS — Z93.6 NEPHROSTOMY STATUS (HCC): ICD-10-CM

## 2023-01-12 DIAGNOSIS — N18.32 TYPE 2 DIABETES MELLITUS WITH STAGE 3B CHRONIC KIDNEY DISEASE, WITH LONG-TERM CURRENT USE OF INSULIN (HCC): ICD-10-CM

## 2023-01-12 DIAGNOSIS — F03.90 DEMENTIA, UNSPECIFIED DEMENTIA SEVERITY, UNSPECIFIED DEMENTIA TYPE, UNSPECIFIED WHETHER BEHAVIORAL, PSYCHOTIC, OR MOOD DISTURBANCE OR ANXIETY (HCC): ICD-10-CM

## 2023-01-12 DIAGNOSIS — E11.22 TYPE 2 DIABETES MELLITUS WITH STAGE 3B CHRONIC KIDNEY DISEASE, WITH LONG-TERM CURRENT USE OF INSULIN (HCC): ICD-10-CM

## 2023-01-12 DIAGNOSIS — Z79.4 TYPE 2 DIABETES MELLITUS WITH STAGE 3B CHRONIC KIDNEY DISEASE, WITH LONG-TERM CURRENT USE OF INSULIN (HCC): ICD-10-CM

## 2023-01-12 DIAGNOSIS — D69.6 THROMBOCYTOPENIA (HCC): ICD-10-CM

## 2023-01-12 DIAGNOSIS — K70.30 ALCOHOLIC CIRRHOSIS OF LIVER WITHOUT ASCITES (HCC): ICD-10-CM

## 2023-01-12 DIAGNOSIS — Z79.4 TYPE 2 DIABETES MELLITUS WITH DIABETIC PERIPHERAL ANGIOPATHY WITHOUT GANGRENE, WITH LONG-TERM CURRENT USE OF INSULIN (HCC): ICD-10-CM

## 2023-01-12 PROBLEM — R19.7 DIARRHEA: Status: RESOLVED | Noted: 2022-08-26 | Resolved: 2023-01-01

## 2023-01-12 PROBLEM — G93.40 ACUTE ENCEPHALOPATHY: Status: RESOLVED | Noted: 2022-10-08 | Resolved: 2023-01-01

## 2023-01-12 PROBLEM — R39.89 SUSPECTED UTI: Status: RESOLVED | Noted: 2022-01-01 | Resolved: 2023-01-01

## 2023-01-12 PROBLEM — G93.40 ACUTE ENCEPHALOPATHY: Status: RESOLVED | Noted: 2022-10-08 | Resolved: 2023-01-12

## 2023-01-12 PROBLEM — R19.7 DIARRHEA: Status: RESOLVED | Noted: 2022-08-26 | Resolved: 2023-01-12

## 2023-01-12 PROBLEM — R39.89 SUSPECTED UTI: Status: RESOLVED | Noted: 2022-12-01 | Resolved: 2023-01-12

## 2023-01-12 LAB — BACTERIA UR CULT: NORMAL

## 2023-01-12 NOTE — PROGRESS NOTES
FAMILY PRACTICE PRE-OPERATIVE EVALUATION  [facility] - [facility] PRACT[facility]    NAME: Hannah Duarte  AGE: 74 y o  SEX: male  : 1950     DATE: 2023    Family Practice Pre-Operative Evaluation      Chief Complaint: Pre-operative Evaluation     Surgery: internalization with ureteral stent  Anticipated Date of Surgery:   Surgeon: Dr Noemi Carroll      History of Present Illness:     Pt scheduled for rt nephro tube removal he ahs been feeling well  urie culture removed      Anesthesia:  general  Bleeding Risk: no recent abnormal bleeding  Current Anti-platelet/anticoagulation medication: Clopidogrel (Plavix)    Assessment of Cardiac Risk:  · Denies unstable or severe angina or MI in the last 6 weeks or history of stent placement in the last year   · Denies decompensated heart failure (e g  New onset heart failure, NYHA functional class IV heart failure, or worsening existing heart failure)  · Denies significant arrhythmias such as high grade AV block, symptomatic ventricular arrhythmia, newly recognized ventricular tachycardia, supraventricular tachycardia with resting heart rate >100, or symptomatic bradycardia  · Denies severe heart valve disease including aortic stenosis or symptomatic mitral stenosis     Exercise Capacity:  · Able to walk 4 blocks without symptoms?: Yes  · Able to walk 2 flights without symptoms?: Yes    Prior Anesthesia Reactions: No     Personal history of venous thromboembolic disease? No    History of steroid use for >2 weeks within last year? No         Review of Systems:     Review of Systems   Constitutional: Negative  HENT: Negative  Eyes: Negative  Respiratory: Negative  Cardiovascular: Negative  Gastrointestinal: Negative  Endocrine: Negative  Genitourinary: Negative  Musculoskeletal: Negative  Skin: Negative  Allergic/Immunologic: Negative  Neurological: Negative  Hematological: Negative      Psychiatric/Behavioral: Negative          Current Problem List:     Patient Active Problem List   Diagnosis   • History of CVA (cerebrovascular accident)   • Controlled diabetes mellitus with diabetic neuropathy, with long-term current use of insulin ([facility] )   • Liver transplant status ([place] [address] )   • History of immunosuppression therapy   • History of hepatitis C   • Thrombocytopenia (HCC)   • Congenital anomaly of accessory auricle   • Cerebral arterial aneurysm   • Erectile dysfunction of non-organic origin   • Headache, chronic daily   • Migraine headache   • Occipital neuralgia   • History of cirrhosis   • Peripheral neuropathy   • Prurigo nodularis   • Pruritus   • Spondylosis of cervical region without myelopathy or radiculopathy   • BPPV (benign paroxysmal positional vertigo), unspecified laterality   • Vitamin D deficiency   • Medicare annual wellness visit, subsequent   • Screening for colon cancer   • Hepatitis B core antibody positive   • Rash   • Type 2 diabetes mellitus with diabetic peripheral angiopathy without gangrene, with long-term current use of insulin (HCC)   • Hand lesion   • Subdural hygroma   • 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HCC)   • Mild cognitive impairment   • Gross hematuria   • Hydronephrosis of right kidney   • Bladder tumor   • Generalized weakness   • Nephrostomy status ([facility] )   • Bladder spasms   • CKD (chronic kidney disease)   • Malignant neoplasm of trigone of urinary bladder ([facility] )   • Port-A-Cath in place   • Urethritis   • Weight loss   • Mixed stress and urge urinary incontinence   • Anemia   • Hospital discharge follow-up   • Orthostatic hypotension   • Multiple falls   • Anemia due to chronic kidney disease   • History of bladder cancer   • Controlled type 2 diabetes mellitus with diabetic neuropathy, with long-term current use of insulin (HCC)   • History of CVA (cerebrovascular accident)   • History of COVID-19   • Right ankle pain   • Orthostatic hypotension   • Hydronephrosis   • Pulmonary nodule   • History of bladder cancer   • History of liver transplant ([facility] )   • DM II (diabetes mellitus, type II), controlled ([facility] )   • Anemia   • Insomnia   • History of hydronephrosis   • Dementia (HCC)   • Hip pain, acute, right   • Solitary pulmonary nodule   • Palliative care encounter   • Metabolic acidosis       Allergies: Allergies   Allergen Reactions   • Tequin [Gatifloxacin] Rash   • Lipitor [Atorvastatin] Hives and Other (See Comments)     Rash and itching   • Ciprofloxacin Rash   • Iohexol Hives and Other (See Comments)     Contraindication with rosuvastatin      • Iv Contrast [Iodinated Contrast Media] Rash and Other (See Comments)     Unknown   • Levofloxacin Rash       Current Medications:       Current Outpatient Medications:   •  acetaminophen (TYLENOL) 325 mg tablet, Take 3 tablets (975 mg total) by mouth every 8 (eight) hours, Disp: , Rfl: 0  •  Alcohol Swabs (B-D SINGLE USE SWABS REGULAR) PADS, USE 2-3 TIMES DAILY AS NEEDED, Disp: , Rfl:   •  Blood Glucose Calibration (OT ULTRA/FASTTK CNTRL SOLN) SOLN, USE AS DIRECTED, Disp: 1 each, Rfl: 0  •  clopidogrel (PLAVIX) 75 mg tablet, TAKE 1 TABLET (75 MG TOTAL) BY MOUTH DAILY, Disp: 90 tablet, Rfl: 3  •  Comfort EZ Pen Needles 32G X 4 MM MISC, USE 3-4 AS DIRECTED, Disp: 100 each, Rfl: 5  •  Continuous Blood Gluc Sensor (FreeStyle Frank 14 Day Sensor) MISC, Use 1 Device 4 (four) times a day, Disp: 1 each, Rfl: 5  •  insulin detemir (Levemir FlexTouch) 100 Units/mL injection pen, Inject 15 Units under the skin daily at bedtime, Disp: 15 mL, Rfl: 3  •  Lancet Devices (Lancing Device) MISC, USE AS DIRECTED, Disp: 1 each, Rfl: 0  •  loperamide (IMODIUM) 2 mg capsule, Take 1 capsule (2 mg total) by mouth 2 (two) times a day as needed for diarrhea Edie carmen tableta dos veces por cynthia si tiene diarrea, Disp: 90 capsule, Rfl: 0  •  Nutritional Supplements (Glucerna Shake) LIQD, Take 1 Bottle by mouth 3 (three) times a day, Disp: 39186 mL, Rfl: 0  •  OneTouch Ultra test strip, TEST 3-4 TIMES A DAY, Disp: 100 each, Rfl: 4  •  rosuvastatin (CRESTOR) 40 MG tablet, TAKE ONE (1) TABLET BY MOUTH DAILY, Disp: 30 tablet, Rfl: 5  •  tacrolimus (PROGRAF) 1 mg capsule, TAKE 1 CAPSULE (1 MG TOTAL) BY MOUTH EVERY 12 (TWELVE) HOURS, Disp: 180 capsule, Rfl: 3  •  tamsulosin (FLOMAX) 0 4 mg, Take 1 capsule (0 4 mg total) by mouth daily with dinner, Disp: 90 capsule, Rfl: 3  •  temazepam (RESTORIL) 7 5 mg capsule, TAKE 2 CAPSULES (15 MG TOTAL) BY MOUTH DAILY AT BEDTIME AS NEEDED FOR SLEEP, Disp: 30 capsule, Rfl: 3  •  zolpidem (AMBIEN) 5 mg tablet, Take 5 mg by mouth daily at bedtime as needed, Disp: , Rfl:   •  Continuous Blood Gluc  (FreeStyle Frank 14 Day Elton) NATALIA, Use 1 Device continuous (Patient not taking: Reported on 12/6/2022), Disp: 1 each, Rfl: 0  •  Incontinence Supply Disposable (Incontinence Brief Medium) MISC, Use 4 (four) times a day, Disp: 120 each, Rfl: 0  •  oxybutynin (DITROPAN) 5 mg tablet, Take 1 tablet (5 mg total) by mouth 2 (two) times a day as needed (bladder spasms), Disp: 60 tablet, Rfl: 0  •  pregabalin (LYRICA) 75 mg capsule, Take 1 capsule (75 mg total) by mouth 2 (two) times a day for 10 days, Disp: 60 capsule, Rfl: 5  •  sodium chloride, 10 ML BY INTRACATHETER ROUTE DAILY INTRACATHETER FLUSHING DAILY   MAY SUBSTITUTE PREFILLED SYRINGE WITH NORMAL SALINE 10 ML VIALS, 10 ML SYRI (Patient not taking: Reported on 1/10/2023), Disp: , Rfl:     Current Facility-Administered Medications:   •  lidocaine (URO-JET, GLYDO) 2 % urethral/mucosal gel 1 application, 1 application, Urethral, Daily PRN, Manolo Anne MD    Past Medical History:       Past Medical History:   Diagnosis Date   • Acute urinary retention 09/12/2022   • Alcoholism ([facility] )    • Anemia    • Bladder cancer ([facility] )    • Cerebral artery aneurysm    • Change in mental state     last assessed 5/18/15; resolved 10/27/15   • Chronic kidney disease    • COVID-19    • Diabetes mellitus ([facility] )    • Drug dependence ([facility] )    • Fatigue     last assessed 1/26/15; resolved 5/24/16   • Hepatitis C    • Hospital discharge follow-up 12/21/2018   • Hx of liver transplant [facility])    • Hydronephrosis of right kidney    • Kidney disease    • 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[facility] )    • Liver cirrhosis ([facility] )    • Liver transplant recipient [facility])    • Renal disorder    • Stroke (cerebrum) ([facility] )    • Stroke ([facility] )    • Subdural hygroma     2/27/14; resolved 7/28/15   • Thrombocytopenia ([facility] ) 09/20/2017        Past Surgical History:   Procedure Laterality Date   • BRAIN SURGERY  02/12/2014    left frontotemporal 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   • CATARACT EXTRACTION     • CHOLECYSTECTOMY     • CYSTOSCOPY  11/30/2022    [place]   • FL LUMBAR PUNCTURE DIAGNOSTIC  12/14/2018   • HEPATITIS B SURFACE AB QN,LIVER TRANSPLANT (HISTORICAL)     • IR NEPHROSTOMY TUBE CHECK/CHANGE/REPOSITION/REINSERTION/UPSIZE  07/29/2022   • IR NEPHROSTOMY TUBE CHECK/CHANGE/REPOSITION/REINSERTION/UPSIZE  08/31/2022   • IR NEPHROSTOMY TUBE CHECK/CHANGE/REPOSITION/REINSERTION/UPSIZE  10/07/2022   • IR NEPHROSTOMY TUBE CHECK/CHANGE/REPOSITION/REINSERTION/UPSIZE  12/3/2022   • IR NEPHROSTOMY TUBE PLACEMENT  05/28/2022   • IR PICC LINE  12/14/2018   • IR PORT PLACEMENT  06/23/2022   • LIVER TRANSPLANTATION     • ROTATOR CUFF REPAIR     • SHOULDER SURGERY     • TRANSURETHRAL RESECTION OF BLADDER TUMOR N/A 05/26/2022    Procedure: TRANSURETHRAL RESECTION OF BLADDER TUMOR (TURBT);   Surgeon: Rashi Young MD;  Location: [facility];  Service: Urology        Family History   Problem Relation Age of Onset   • Hypertension Mother         benign essential    • Lung cancer Father    • Diabetes Sister    • Cancer Brother    • Stroke Other         cva - due to embolism of cerebra artery         Social History     Socioeconomic History   • Marital status: Single     Spouse name: Not on file   • Number of children: Not on file • Years of education: less then high school   • Highest education level: Not on file   Occupational History   • Occupation: physical disability    Tobacco Use   • Smoking status: Former     Packs/day: 1 00     Types: Cigarettes     Quit date:      Years since quittin 0   • Smokeless tobacco: Never   • Tobacco comments:     former smoker per allscripts    Vaping Use   • Vaping Use: Never used   Substance and Sexual Activity   • Alcohol use: Not Currently   • Drug use: No     Comment: remotely quit drug use per allscripts    • Sexual activity: Not Currently   Other Topics Concern   • Not on file   Social History Narrative    ** Merged History Encounter **         ** Merged History Encounter **         Caffeine use   Living with significant other , girlfriend and daughter      Social Determinants of Health     Financial Resource Strain: Not on file   Food Insecurity: No Food Insecurity   • Worried About Running Out of Food in the Last Year: Never true   • Ran Out of Food in the Last Year: Never true   Transportation Needs: No Transportation Needs   • Lack of Transportation (Medical): No   • Lack of Transportation (Non-Medical): No   Physical Activity: Not on file   Stress: Not on file   Social Connections: Not on file   Intimate Partner Violence: Not on file   Housing Stability: Low Risk    • Unable to Pay for Housing in the Last Year: No   • Number of Places Lived in the Last Year: 1   • Unstable Housing in the Last Year: No        Physical Exam:     /64 (BP Location: Left arm, Patient Position: Sitting, Cuff Size: Standard)   Pulse 63   Temp (!) 96 7 °F (35 9 °C)   Resp 17   Ht 5' 3" (1 6 m)   Wt 56 2 kg (124 lb)   SpO2 100%   BMI 21 97 kg/m²     Physical Exam  Vitals and nursing note reviewed  Constitutional:       Appearance: Normal appearance  HENT:      Head: Normocephalic and atraumatic  Eyes:      Extraocular Movements: Extraocular movements intact        Pupils: Pupils are equal, round, and reactive to light  Cardiovascular:      Rate and Rhythm: Normal rate and regular rhythm  Pulses: Normal pulses  Heart sounds: Normal heart sounds  Pulmonary:      Effort: Pulmonary effort is normal       Breath sounds: Normal breath sounds  Abdominal:      General: Abdomen is flat  Palpations: Abdomen is soft  Musculoskeletal:      Cervical back: Normal range of motion and neck supple  Skin:     Capillary Refill: Capillary refill takes less than 2 seconds  Neurological:      General: No focal deficit present  Mental Status: He is alert and oriented to person, place, and time  Psychiatric:         Mood and Affect: Mood normal          Behavior: Behavior normal          Thought Content: Thought content normal          Judgment: Judgment normal           Data:     Pre-operative work-up    Laboratory Results: I have personally reviewed the pertinent laboratory results/reports      EKG: I have personally reviewed pertinent reports        Recent Results (from the past 672 hour(s))   Urine culture    Collection Time: 01/10/23 11:03 AM    Specimen: Urine, Clean Catch   Result Value Ref Range    Urine Culture Culture too young- will reincubate            Assessment & Recommendations:     Problem List Items Addressed This Visit        Digestive    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[facility] )     S/p liver transplant  On prograf            Endocrine    Type 2 diabetes mellitus with diabetic peripheral angiopathy without gangrene, with long-term current use of insulin (HCC)     Stable  Half insulin right before surgery  Lab Results   Component Value Date    HGBA1C 7 4 (A) 12/06/2022               Nervous and Auditory    Dementia (HCC) (Chronic)     Stable for now            Hematopoietic and Hemostatic    Thrombocytopenia (HCC) (Chronic)     Watching CBC            Genitourinary    Malignant neoplasm of trigone of urinary bladder [facility])     Seeing urology            Other    Liver transplant status ([facility] ) (Chronic)    Nephrostomy status ([facility] )     Scheduled for surgery        Other Visit Diagnoses     Pre-operative clearance    -  Primary    Relevant Orders    POCT ECG (Completed)    Type 2 diabetes mellitus with stage 3b chronic kidney disease, with long-term current use of insulin ([facility] )        Other artificial openings of urinary tract status ([facility] )              Pre-Op Evaluation Assessment  74 y o  male with planned surgery: cystoscopy with internalization with ureteral strent  Known risk factors for perioperative complications: Renal dysfunction  Current medications which may produce withdrawal symptoms if withheld perioperatively: none  Pre-Op Evaluation Plan  1  Further preoperative workup as follows:   - None; no further preoperative work-up is required    2  Medication Management/Recommendations: will hold plavix 7 days prior per surgeons request     3  Prophylaxis for cardiac events with perioperative beta-blockers: not indicated  4  Patient requires further consultation with: None    Clearance  Patient is CLEARED for surgery without any additional cardiac testing       Vincent Min DO  [address] [address]  Phone#  [phone]  Fax#  [phone] VTE Assessment already completed for this visit

## 2023-01-12 NOTE — ASSESSMENT & PLAN NOTE
Stable  Half insulin right before surgery  Lab Results   Component Value Date    HGBA1C 7 4 (A) 12/06/2022

## 2023-01-18 ENCOUNTER — PREP FOR PROCEDURE (OUTPATIENT)
Dept: UROLOGY | Facility: CLINIC | Age: 73
End: 2023-01-18

## 2023-01-18 ENCOUNTER — ANESTHESIA (OUTPATIENT)
Dept: PERIOP | Facility: HOSPITAL | Age: 73
End: 2023-01-18

## 2023-01-18 ENCOUNTER — HOSPITAL ENCOUNTER (OUTPATIENT)
Facility: HOSPITAL | Age: 73
Setting detail: OUTPATIENT SURGERY
Discharge: HOME | End: 2023-01-18
Attending: UROLOGY | Admitting: UROLOGY

## 2023-01-18 ENCOUNTER — APPOINTMENT (OUTPATIENT)
Dept: RADIOLOGY | Facility: HOSPITAL | Age: 73
End: 2023-01-18

## 2023-01-18 ENCOUNTER — TELEPHONE (OUTPATIENT)
Dept: UROLOGY | Facility: CLINIC | Age: 73
End: 2023-01-18

## 2023-01-18 VITALS
WEIGHT: 123.24 LBS | TEMPERATURE: 98 F | HEIGHT: 63 IN | RESPIRATION RATE: 18 BRPM | OXYGEN SATURATION: 100 % | SYSTOLIC BLOOD PRESSURE: 127 MMHG | HEART RATE: 78 BPM | DIASTOLIC BLOOD PRESSURE: 62 MMHG | BODY MASS INDEX: 21.84 KG/M2

## 2023-01-18 DIAGNOSIS — N13.5 OBSTRUCTION OF RIGHT URETER: Primary | ICD-10-CM

## 2023-01-18 DIAGNOSIS — T83.512A: ICD-10-CM

## 2023-01-18 DIAGNOSIS — N35.913 STRICTURE OF MEMBRANOUS URETHRA IN MALE, UNSPECIFIED STRICTURE TYPE: ICD-10-CM

## 2023-01-18 DIAGNOSIS — N13.30 HYDRONEPHROSIS OF RIGHT KIDNEY: ICD-10-CM

## 2023-01-18 DIAGNOSIS — D49.4 BLADDER TUMOR: ICD-10-CM

## 2023-01-18 DIAGNOSIS — I63.81 LEFT THALAMIC INFARCTION (HCC): ICD-10-CM

## 2023-01-18 LAB
ANION GAP SERPL CALCULATED.3IONS-SCNC: 5 MMOL/L (ref 4–13)
BUN SERPL-MCNC: 46 MG/DL (ref 5–25)
CALCIUM SERPL-MCNC: 7.7 MG/DL (ref 8.4–10.2)
CHLORIDE SERPL-SCNC: 110 MMOL/L (ref 96–108)
CO2 SERPL-SCNC: 22 MMOL/L (ref 21–32)
CREAT SERPL-MCNC: 2.48 MG/DL (ref 0.6–1.3)
GFR SERPL CREATININE-BSD FRML MDRD: 24 ML/MIN/1.73SQ M
GLUCOSE P FAST SERPL-MCNC: 95 MG/DL (ref 65–99)
GLUCOSE SERPL-MCNC: 136 MG/DL (ref 65–140)
GLUCOSE SERPL-MCNC: 95 MG/DL (ref 65–140)
GLUCOSE SERPL-MCNC: 99 MG/DL (ref 65–140)
MAGNESIUM SERPL-MCNC: 1.5 MG/DL (ref 1.9–2.7)
POTASSIUM SERPL-SCNC: 4.5 MMOL/L (ref 3.5–5.3)
SODIUM SERPL-SCNC: 137 MMOL/L (ref 135–147)

## 2023-01-18 DEVICE — INLAY OPTIMA URETERAL STENT W/O GUIDEWIRE
Type: IMPLANTABLE DEVICE | Site: URETER | Status: FUNCTIONAL
Brand: BARD® INLAY OPTIMA® URETERAL STENT

## 2023-01-18 RX ORDER — HYDROCODONE BITARTRATE AND ACETAMINOPHEN 5; 325 MG/1; MG/1
1 TABLET ORAL EVERY 6 HOURS PRN
Status: DISCONTINUED | OUTPATIENT
Start: 2023-01-18 | End: 2023-01-20 | Stop reason: HOSPADM

## 2023-01-18 RX ORDER — MAGNESIUM HYDROXIDE 1200 MG/15ML
LIQUID ORAL AS NEEDED
Status: DISCONTINUED | OUTPATIENT
Start: 2023-01-18 | End: 2023-01-18 | Stop reason: HOSPADM

## 2023-01-18 RX ORDER — NEOSTIGMINE METHYLSULFATE 1 MG/ML
INJECTION INTRAVENOUS AS NEEDED
Status: DISCONTINUED | OUTPATIENT
Start: 2023-01-18 | End: 2023-01-18

## 2023-01-18 RX ORDER — ROCURONIUM BROMIDE 10 MG/ML
INJECTION, SOLUTION INTRAVENOUS AS NEEDED
Status: DISCONTINUED | OUTPATIENT
Start: 2023-01-18 | End: 2023-01-18

## 2023-01-18 RX ORDER — HYDROMORPHONE HCL/PF 1 MG/ML
0.5 SYRINGE (ML) INJECTION
Status: DISCONTINUED | OUTPATIENT
Start: 2023-01-18 | End: 2023-01-18 | Stop reason: HOSPADM

## 2023-01-18 RX ORDER — CLOPIDOGREL BISULFATE 75 MG/1
75 TABLET ORAL DAILY
Qty: 90 TABLET | Refills: 0 | Status: SHIPPED | OUTPATIENT
Start: 2023-01-23

## 2023-01-18 RX ORDER — GLYCOPYRROLATE 0.2 MG/ML
INJECTION INTRAMUSCULAR; INTRAVENOUS AS NEEDED
Status: DISCONTINUED | OUTPATIENT
Start: 2023-01-18 | End: 2023-01-18

## 2023-01-18 RX ORDER — TAMSULOSIN HYDROCHLORIDE 0.4 MG/1
0.4 CAPSULE ORAL ONCE
Status: COMPLETED | OUTPATIENT
Start: 2023-01-18 | End: 2023-01-18

## 2023-01-18 RX ORDER — MAGNESIUM SULFATE HEPTAHYDRATE 40 MG/ML
2 INJECTION, SOLUTION INTRAVENOUS ONCE
Status: COMPLETED | OUTPATIENT
Start: 2023-01-18 | End: 2023-01-18

## 2023-01-18 RX ORDER — LIDOCAINE HYDROCHLORIDE 10 MG/ML
INJECTION, SOLUTION EPIDURAL; INFILTRATION; INTRACAUDAL; PERINEURAL AS NEEDED
Status: DISCONTINUED | OUTPATIENT
Start: 2023-01-18 | End: 2023-01-18

## 2023-01-18 RX ORDER — MEPERIDINE HYDROCHLORIDE 25 MG/ML
12.5 INJECTION INTRAMUSCULAR; INTRAVENOUS; SUBCUTANEOUS ONCE AS NEEDED
Status: DISCONTINUED | OUTPATIENT
Start: 2023-01-18 | End: 2023-01-18 | Stop reason: HOSPADM

## 2023-01-18 RX ORDER — PROPOFOL 10 MG/ML
INJECTION, EMULSION INTRAVENOUS AS NEEDED
Status: DISCONTINUED | OUTPATIENT
Start: 2023-01-18 | End: 2023-01-18

## 2023-01-18 RX ORDER — FENTANYL CITRATE 50 UG/ML
INJECTION, SOLUTION INTRAMUSCULAR; INTRAVENOUS AS NEEDED
Status: DISCONTINUED | OUTPATIENT
Start: 2023-01-18 | End: 2023-01-18

## 2023-01-18 RX ORDER — ONDANSETRON 2 MG/ML
4 INJECTION INTRAMUSCULAR; INTRAVENOUS ONCE AS NEEDED
Status: DISCONTINUED | OUTPATIENT
Start: 2023-01-18 | End: 2023-01-18 | Stop reason: HOSPADM

## 2023-01-18 RX ORDER — PROMETHAZINE HYDROCHLORIDE 25 MG/ML
6.25 INJECTION, SOLUTION INTRAMUSCULAR; INTRAVENOUS ONCE AS NEEDED
Status: DISCONTINUED | OUTPATIENT
Start: 2023-01-18 | End: 2023-01-18 | Stop reason: HOSPADM

## 2023-01-18 RX ORDER — CEFUROXIME AXETIL 250 MG/1
250 TABLET ORAL EVERY 12 HOURS SCHEDULED
Qty: 6 TABLET | Refills: 0 | Status: SHIPPED | OUTPATIENT
Start: 2023-01-18 | End: 2023-01-21

## 2023-01-18 RX ORDER — FENTANYL CITRATE/PF 50 MCG/ML
50 SYRINGE (ML) INJECTION
Status: DISCONTINUED | OUTPATIENT
Start: 2023-01-18 | End: 2023-01-18 | Stop reason: HOSPADM

## 2023-01-18 RX ORDER — OXYBUTYNIN CHLORIDE 5 MG/1
5 TABLET, EXTENDED RELEASE ORAL ONCE
Status: COMPLETED | OUTPATIENT
Start: 2023-01-18 | End: 2023-01-18

## 2023-01-18 RX ORDER — SODIUM CHLORIDE 9 MG/ML
125 INJECTION, SOLUTION INTRAVENOUS CONTINUOUS
Status: DISCONTINUED | OUTPATIENT
Start: 2023-01-18 | End: 2023-01-20 | Stop reason: HOSPADM

## 2023-01-18 RX ORDER — CEFAZOLIN SODIUM 1 G/50ML
1000 SOLUTION INTRAVENOUS ONCE
Status: COMPLETED | OUTPATIENT
Start: 2023-01-18 | End: 2023-01-18

## 2023-01-18 RX ORDER — ONDANSETRON 2 MG/ML
INJECTION INTRAMUSCULAR; INTRAVENOUS AS NEEDED
Status: DISCONTINUED | OUTPATIENT
Start: 2023-01-18 | End: 2023-01-18

## 2023-01-18 RX ADMIN — ROCURONIUM BROMIDE 30 MG: 10 INJECTION, SOLUTION INTRAVENOUS at 11:19

## 2023-01-18 RX ADMIN — FENTANYL CITRATE 100 MCG: 50 INJECTION INTRAMUSCULAR; INTRAVENOUS at 11:19

## 2023-01-18 RX ADMIN — NEOSTIGMINE METHYLSULFATE 3 MG: 1 INJECTION INTRAVENOUS at 11:50

## 2023-01-18 RX ADMIN — GLYCOPYRROLATE 0.4 MG: 0.2 INJECTION INTRAMUSCULAR; INTRAVENOUS at 11:50

## 2023-01-18 RX ADMIN — MAGNESIUM SULFATE HEPTAHYDRATE 2 G: 40 INJECTION, SOLUTION INTRAVENOUS at 15:26

## 2023-01-18 RX ADMIN — FENTANYL CITRATE 50 MCG: 50 INJECTION, SOLUTION INTRAMUSCULAR; INTRAVENOUS at 12:28

## 2023-01-18 RX ADMIN — TAMSULOSIN HYDROCHLORIDE 0.4 MG: 0.4 CAPSULE ORAL at 15:26

## 2023-01-18 RX ADMIN — ONDANSETRON 4 MG: 2 INJECTION INTRAMUSCULAR; INTRAVENOUS at 11:33

## 2023-01-18 RX ADMIN — OXYBUTYNIN CHLORIDE 5 MG: 5 TABLET, EXTENDED RELEASE ORAL at 15:26

## 2023-01-18 RX ADMIN — PROPOFOL 150 MG: 10 INJECTION, EMULSION INTRAVENOUS at 11:19

## 2023-01-18 RX ADMIN — SODIUM CHLORIDE 125 ML/HR: 0.9 INJECTION, SOLUTION INTRAVENOUS at 10:19

## 2023-01-18 RX ADMIN — FENTANYL CITRATE 50 MCG: 50 INJECTION, SOLUTION INTRAMUSCULAR; INTRAVENOUS at 12:19

## 2023-01-18 RX ADMIN — CEFAZOLIN SODIUM 1000 MG: 1 SOLUTION INTRAVENOUS at 11:06

## 2023-01-18 RX ADMIN — SUGAMMADEX 200 MG: 100 INJECTION, SOLUTION INTRAVENOUS at 11:57

## 2023-01-18 RX ADMIN — LIDOCAINE HYDROCHLORIDE 60 MG: 10 INJECTION, SOLUTION EPIDURAL; INFILTRATION; INTRACAUDAL; PERINEURAL at 11:19

## 2023-01-18 NOTE — OP NOTE
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, unspecified stricture type [N35 913]    Post-Op Diagnosis Codes:     * Inflammatory reaction due to nephrostomy catheter, initial encounter ([facility] ) [I42 785T]     * Bladder tumor [D49 4]     * Hydronephrosis of right kidney [N13 30]     * 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, unspecified stricture type [N35 913]    Procedure(s) (LRB):  URETHRAL DILATION, CYSTOGRAM, CYSTOSCOPY, RIGHT RETEROGRADE PYELOGRAM, STENT PLACEMENT, COMPLEX FLORES PLACEMENT (Right)  Removal of  percutaneous nephroureteral catheter (N/A)   Bladder biopsy and fulguration    Specimen(s):  ID Type Source Tests Collected by Time Destination   1 : Right Trigone Bladder Lesion Tissue Other TISSUE EXAM Donny Cody MD 2023 1149        Estimated Blood Loss:   2 mL    Drains:  Urethral Catheter Other (Comment) 16 Fr  (Active)   Site Assessment Clean;Skin intact 23 1203   Collection Container Standard drainage bag 23 1203   Number of days: 0       Ureteral Internal Stent Right ureter 6 Fr  (Active)   Number of days: 0       Anesthesia Type:   General    Operative Indications:  Inflammatory reaction due to nephrostomy catheter, initial encounter [facility]) [T83 512A]  Bladder tumor [D49 4]  Hydronephrosis of right kidney [N13 30]  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, unspecified stricture type [N35 913]      Operative Findings:  1  Urethral stricture  2  PCNU in place  3  Patent ureter  4  Inflammation around trigone, biopsy performed  5   16F counciltip flores placed over wire with radiology    Complications:   None    Procedure and Technique:  Kathie Sellers is a 74 y o  male with a history of liver transplant and diagnosis of advanced bladder cancer  Patient failed to complete a complete course of chemotherapy but did receive a complete course of radiation  Patient was not a surgical candidate  Patient had nephrostomy tube placed which has been converted to a percutaneous nephroureteral catheter  The patient was counseled regarding their options and ultimately opted to proceed with cystoscopy with plan to biopsy any concerning areas of the bladder and potential internalization of his nephroureteral catheter  Risks and benefits of the procedure were described and the patient signed an informed consent  On 1/18/2023 , the patient proceeded to the operating room  They were laid supine on the operating room table and a pre-procedure time out was performed with all parties present and in agreement of the procedure planned and laterality  Patient received intravenous antibiotics in the form of Ancef and sequential compression devices were placed on bilateral lower extremities  They were then induced with a general endotracheal anesthetic  Patient was placed in dorsal lithotomy with care to pad all pressure points  Perineum and genitalia were prepped and then draped in a sterile fashion  Attempt to introduce a 22 [place]i cystoscope sheath and 30 degree lens were unsuccessful  Patient was noted to have a severe distal stricture  Bard Solo plus wire with a hydrophilic tip was introduced into the patient's bladder  This was confirmed fluoroscopically  5 [place] open-ended tiger tip catheter advanced over the wire and the wire was removed  Attempts to aspirate were unsuccessful  A small amount of contrast was placed as a cystogram to confirm we were in the bladder lumen  Amplatz Super Stiff wire was then exchanged with a 5 [place] catheter  Hernán Manfred dilation was performed from 14-24 [place]    After removal of the Hernán Manfred dilator, the Amplatz Super Stiff wire was left in place  A 22 Chinese cystoscope and 30 degree lens could then be entered into the patient's bladder  Distal coil of the nephroureteral catheter was noted  There was some inflammation and irritation around the ureter however no recurrent disease or cancer was noted  Bard Solo plus wire was then advanced alongside a nephroureteral catheter and confirmed around the proximal coil  Under continuous fluoroscopy, the nephrostomy tube was removed intact  Over the [facility] plus wire, the cystoscope was reintroduced into the bladder and a 5 Chinese tiger tip catheter was used to perform a retrograde pyelogram   There was no significant hydronephrosis noted  There did not appear to be a residual patent tract around the nephroureteral catheter  A 6 x 26 double-J stent was placed in the normal standard position  There was a good proximal and distal coil  Cold cup biopsy forcep was used to remove small less than half centimeter samples of the inflammatory tissue around the right trigone now that stent was in place  Bugbee electrocautery was used to fulgurate the areas to reduce due to the risk of bleeding  Because of the dilation of the patient's urethra, I introduced a 12 [place] Mary's [place] tip catheter over the [employer] Stiff wire  There was some gentle resistance  Once at the hub, the Amplatz Super Stiff wire was removed and 10 cc of sterile water were placed in the balloon  Again a small amount of contrast was instilled through the Webb to confirm correct position  At the completion of the procedure, the patient was extubated and transferred to PACU in good condition  Plan-the patient will maintain his urethral Webb catheter for 1 week  He will return in 3 months for exchange of his ureteral stent, hopefully for a metallic indwelling stent      Patient Disposition:  PACU         SIGNATURE: Veronique Walters MD  DATE: January 18, 2023  TIME: 12:13 PM

## 2023-01-18 NOTE — ANESTHESIA PREPROCEDURE EVALUATION
Procedure:  CYSTO W/ STENT (Right: Bladder)  URETEROSCOPY w/ ureteral biopsy (Right: Bladder)  (TURBT) (Bladder)  Removal of  percutaneous nephroureteral catheter ([place])    Relevant Problems   ANESTHESIA (within normal limits)      CARDIO   (+) Cerebral arterial aneurysm   (+) Migraine headache   (+) Type 2 diabetes mellitus with diabetic peripheral angiopathy without gangrene, with long-term current use of insulin (HCC)      ENDO   (+) Controlled type 2 diabetes mellitus with diabetic neuropathy, with long-term current use of insulin (HCC)   (+) DM II (diabetes mellitus, type II), controlled ([address] )   (+) Type 2 diabetes mellitus with diabetic peripheral angiopathy without gangrene, with long-term current use of insulin (HCC)      GI/HEPATIC   (+) 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HCC)      /RENAL   (+) CKD (chronic kidney disease)   (+) Hydronephrosis   (+) Hydronephrosis of right kidney      HEMATOLOGY   (+) Anemia   (+) Anemia due to chronic kidney disease   (+) Thrombocytopenia (HCC)      MUSCULOSKELETAL   (+) Spondylosis of cervical region without myelopathy or radiculopathy      NEURO/PSYCH   (+) Dementia (HCC)   (+) Headache, chronic daily   (+) History of COVID-19   (+) History of CVA (cerebrovascular accident)   (+) History of bladder cancer   (+) History of cirrhosis   (+) History of hepatitis C   (+) History of hydronephrosis   (+) Migraine headache      Endocrine   (+) Controlled diabetes mellitus with diabetic neuropathy, with long-term current use of insulin (HCC)      Nervous and Auditory   (+) Congenital anomaly of accessory auricle      Other   (+) Pulmonary nodule   (+) Solitary pulmonary nodule       12/2018  LEFT VENTRICLE: Size was normal  Systolic function was normal  Ejection fraction was estimated to be 65 %  There were no regional wall motion abnormalities  Wall thickness was normal  There was no evidence of concentric hypertrophy    DOPPLER: Doppler parameters were consistent with abnormal left ventricular relaxation (grade 1 diastolic dysfunction)      RIGHT VENTRICLE: The size was normal  Systolic function was normal  Wall thickness was normal      LEFT ATRIUM: Size was normal      RIGHT ATRIUM: Size was normal      MITRAL VALVE: Valve structure was normal  There was normal leaflet separation  DOPPLER: The transmitral velocity was within the normal range  There was no evidence for stenosis  There was no significant regurgitation      AORTIC VALVE: The valve was trileaflet  Leaflets exhibited normal thickness and normal cuspal separation  DOPPLER: Transaortic velocity was within the normal range  There was no evidence for stenosis  There was no significant  regurgitation      TRICUSPID VALVE: The valve structure was normal  There was normal leaflet separation  DOPPLER: The transtricuspid velocity was within the normal range  There was no evidence for stenosis  There was no significant regurgitation      PULMONIC VALVE: Leaflets exhibited normal thickness, no calcification, and normal cuspal separation  DOPPLER: The transpulmonic velocity was within the normal range  There was trace regurgitation      PERICARDIUM: There was no pericardial effusion  The pericardium was normal in appearance      AORTA: The root exhibited normal size      SYSTEMIC VEINS: IVC: The inferior vena cava was normal in size  Physical Exam    Airway    Mallampati score: II  TM Distance: >3 FB  Neck ROM: full     Dental   upper dentures and lower dentures,     Cardiovascular  Rhythm: regular, Rate: normal, Cardiovascular exam normal    Pulmonary  Pulmonary exam normal Breath sounds clear to auscultation,     Other Findings        Anesthesia Plan  ASA Score- 4     Anesthesia Type- general with ASA Monitors  Additional Monitors:   Airway Plan: ETT  Plan Factors-Exercise tolerance (METS): >4 METS  Chart reviewed  EKG reviewed  Existing labs reviewed  Patient summary reviewed      Patient is not a current smoker  There is medical exclusion for perioperative obstructive sleep apnea risk education  Induction- intravenous  Postoperative Plan- Plan for postoperative opioid use  Planned trial extubation    Informed Consent- Anesthetic plan and risks discussed with patient (SHERLYN EvansTiffany Ely))  I personally reviewed this patient with the CRNA  Discussed and agreed on the Anesthesia Plan with the CRNA  Chayito Pak

## 2023-01-18 NOTE — INTERVAL H&P NOTE
H&P reviewed  After examining the patient I find no changes in the patients condition since the H&P had been written  Plan for cystoscopy, possible RIGHT retrograde/antegrade pyelogram, possible RIGHT stent placement with removal of PCNU, possible TURBT or bladder biopsy/fulguration      Vitals:    01/18/23 0953   BP: 138/67   Pulse: 66   Resp: 16   Temp: 97 9 °F (36 6 °C)   SpO2: 99%

## 2023-01-18 NOTE — ANESTHESIA POSTPROCEDURE EVALUATION
Post-Op Assessment Note    CV Status:  Stable    Pain management: adequate     Mental Status:  Alert and awake   Hydration Status:  Euvolemic   PONV Controlled:  Controlled   Airway Patency:  Patent      Post Op Vitals Reviewed: Yes      Staff: Anesthesiologist         No notable events documented      BP      Temp      Pulse     Resp      SpO2      /62   Pulse 78   Temp 98 °F (36 7 °C) (Temporal)   Resp 18   Ht 5' 3" (1 6 m)   Wt 55 9 kg (123 lb 3 8 oz)   SpO2 100%   BMI 21 83 kg/m²

## 2023-01-18 NOTE — PROGRESS NOTES
Late entry    Was called for PVCs noted on monitor in recovery room  Pt seen and examined  BP stable, HR stable, sating 100%  Pt denies chest pain and SOB  BMP and Mg levels ordered  2g Mg ordered in SDS  Reassessed pt  Pt seen eating meal  Pt continues to deny chest pain and SOB  BP stable, HR stable, sating 100%  No PVCs noted on monitor      Nathan Jung  1/18/2023  4:50 PM

## 2023-01-18 NOTE — TELEPHONE ENCOUNTER
Patient had removal of PCNU and exchange for internal JJ stent today  Has flores which will need RN removal appt in one week  IR team included so future PCNU exchanges can be cancelled      Will plan for return to OR for stent exchange in three months, hopefully for metallic stent so changes can be longer interval (~1yr)

## 2023-01-18 NOTE — DISCHARGE INSTRUCTIONS
[facility]-we were able to remove your nephrostomy and place an internal stent  We will exchange this in three months time  You had a severe narrowing or stricture in your penile urethral that needed dilation to pass scope  You will go home with a flores catheter for one week   My office will call to arrange removal

## 2023-01-19 ENCOUNTER — NURSE TRIAGE (OUTPATIENT)
Dept: OTHER | Facility: OTHER | Age: 73
End: 2023-01-19

## 2023-01-20 ENCOUNTER — TELEPHONE (OUTPATIENT)
Dept: UROLOGY | Facility: CLINIC | Age: 73
End: 2023-01-20

## 2023-01-20 ENCOUNTER — HOSPITAL ENCOUNTER (EMERGENCY)
Facility: HOSPITAL | Age: 73
Discharge: HOME | End: 2023-01-20
Attending: EMERGENCY MEDICINE

## 2023-01-20 VITALS
DIASTOLIC BLOOD PRESSURE: 70 MMHG | HEART RATE: 79 BPM | TEMPERATURE: 97.4 F | SYSTOLIC BLOOD PRESSURE: 145 MMHG | OXYGEN SATURATION: 98 % | RESPIRATION RATE: 18 BRPM

## 2023-01-20 DIAGNOSIS — N18.9 CKD (CHRONIC KIDNEY DISEASE): ICD-10-CM

## 2023-01-20 DIAGNOSIS — N32.89 BLADDER SPASM: ICD-10-CM

## 2023-01-20 DIAGNOSIS — T83.091A: ICD-10-CM

## 2023-01-20 DIAGNOSIS — Z85.51 HISTORY OF BLADDER CANCER: ICD-10-CM

## 2023-01-20 DIAGNOSIS — R39.89 URETHRAL PAIN: ICD-10-CM

## 2023-01-20 DIAGNOSIS — T83.031A: Primary | ICD-10-CM

## 2023-01-20 RX ORDER — OXYCODONE HYDROCHLORIDE 5 MG/1
5 TABLET ORAL ONCE
Status: COMPLETED | OUTPATIENT
Start: 2023-01-20 | End: 2023-01-20

## 2023-01-20 RX ORDER — FENTANYL CITRATE 50 UG/ML
50 INJECTION, SOLUTION INTRAMUSCULAR; INTRAVENOUS ONCE
Status: DISCONTINUED | OUTPATIENT
Start: 2023-01-20 | End: 2023-01-20

## 2023-01-20 RX ORDER — HYDROCODONE BITARTRATE AND ACETAMINOPHEN 5; 325 MG/1; MG/1
1 TABLET ORAL EVERY 6 HOURS PRN
Qty: 4 TABLET | Refills: 0 | Status: SHIPPED | OUTPATIENT
Start: 2023-01-20

## 2023-01-20 RX ORDER — OXYBUTYNIN CHLORIDE 5 MG/1
5 TABLET ORAL 3 TIMES DAILY
Qty: 30 TABLET | Refills: 0 | Status: SHIPPED | OUTPATIENT
Start: 2023-01-20 | End: 2023-01-30

## 2023-01-20 RX ORDER — OXYBUTYNIN CHLORIDE 5 MG/1
5 TABLET ORAL ONCE
Status: COMPLETED | OUTPATIENT
Start: 2023-01-20 | End: 2023-01-20

## 2023-01-20 RX ORDER — LIDOCAINE HYDROCHLORIDE 20 MG/ML
1 JELLY TOPICAL ONCE
Status: COMPLETED | OUTPATIENT
Start: 2023-01-20 | End: 2023-01-20

## 2023-01-20 RX ADMIN — OXYCODONE HYDROCHLORIDE 5 MG: 5 TABLET ORAL at 12:42

## 2023-01-20 RX ADMIN — OXYBUTYNIN CHLORIDE 5 MG: 5 TABLET ORAL at 14:26

## 2023-01-20 RX ADMIN — LIDOCAINE HYDROCHLORIDE 1 APPLICATION: 20 JELLY TOPICAL at 14:26

## 2023-01-20 NOTE — ED NOTES
Flushed catheter with total 100ml of NSS - about 25 ml returned out of catheter into collection bag but mostly leaking around catheter from patient's penis       Sky Lozada RN  01/20/23 4422

## 2023-01-20 NOTE — ED PROVIDER NOTES
History  Chief Complaint   Patient presents with   • Urinary Catheter Problem     Patient states urology procedure today  Noticed leaking around penis and pain starting last night  Patient is a 75-year-old male with a past medical history of bladder cancer, DM, liver transplant secondary to cirrhosis, and CKD who presented with urinary catheter leakage and penile pain  Patient had urethral dilation and stent exchange yesterday by Urology where they placed a Webb catheter  The patient is complaining of urine leakage out of his penis as well as penile pain  He denies suprapubic pain  He was placed on oxybutynin for muscle spasms  He has decreased his fluid intake because of the leakage  He has an appointment with urology on Thursday to have the catheter removed  He had a small amount of blood surrounding his urethral meatus this morning however today denies any further bleeding  He denies hematuria or enuresis  Prior to Admission Medications   Prescriptions Last Dose Informant Patient Reported? Taking?    Alcohol Swabs (B-D SINGLE USE SWABS REGULAR) PADS   Yes No   Sig: USE 2-3 TIMES DAILY AS NEEDED   Blood Glucose Calibration (OT ULTRA/FASTTK CNTRL SOLN) SOLN   No No   Sig: USE AS DIRECTED   Comfort EZ Pen Needles 32G X 4 MM MISC   No No   Sig: USE 3-4 AS DIRECTED   Continuous Blood Gluc  (FreeStyle Frank 14 Day [place]) NATALIA   No No   Sig: Use 1 Device continuous   Patient not taking: Reported on 12/6/2022   Continuous Blood Gluc Sensor (FreeStyle Frank 14 Day Sensor) [facility]   No No   Sig: Use 1 Device 4 (four) times a day   Incontinence Supply Disposable (Incontinence Brief Medium) MISC   No No   Sig: Use 4 (four) times a day   Lancet Devices (Lancing Device) MISC   No No   Sig: USE AS DIRECTED   Nutritional Supplements (Glucerna Shake) LIQD   No No   Sig: Take 1 Bottle by mouth 3 (three) times a day   OneTouch Ultra test strip   No No   Sig: TEST 3-4 TIMES A DAY   acetaminophen (TYLENOL) 325 mg tablet   No No   Sig: Take 3 tablets (975 mg total) by mouth every 8 (eight) hours   cefuroxime (CEFTIN) 250 mg tablet   No No   Sig: Take 1 tablet (250 mg total) by mouth every 12 (twelve) hours for 3 days   clopidogrel (PLAVIX) 75 mg tablet   No No   Sig: Take 1 tablet (75 mg total) by mouth daily Do not start before January 23, 2023     insulin detemir (Levemir FlexTouch) 100 Units/mL injection pen   No No   Sig: Inject 15 Units under the skin daily at bedtime   loperamide (IMODIUM) 2 mg capsule   No No   Sig: Take 1 capsule (2 mg total) by mouth 2 (two) times a day as needed for diarrhea [place] carmen tableta dos veces por cynthia si tiene diarrea   oxybutynin (DITROPAN) 5 mg tablet   No No   Sig: Take 1 tablet (5 mg total) by mouth 2 (two) times a day as needed (bladder spasms)   pregabalin (LYRICA) 75 mg capsule   No No   Sig: Take 1 capsule (75 mg total) by mouth 2 (two) times a day for 10 days   rosuvastatin (CRESTOR) 40 MG tablet   No No   Sig: TAKE ONE (1) TABLET BY MOUTH DAILY   sodium chloride   Yes No   tacrolimus (PROGRAF) 1 mg capsule   No No   Sig: TAKE 1 CAPSULE (1 MG TOTAL) BY MOUTH EVERY 12 (TWELVE) HOURS   tamsulosin (FLOMAX) 0 4 mg   No No   Sig: Take 1 capsule (0 4 mg total) by mouth daily with dinner   temazepam (RESTORIL) 7 5 mg capsule   No No   Sig: TAKE 2 CAPSULES (15 MG TOTAL) BY MOUTH DAILY AT BEDTIME AS NEEDED FOR SLEEP   zolpidem (AMBIEN) 5 mg tablet   Yes No   Sig: Take 5 mg by mouth daily at bedtime as needed      Facility-Administered Medications Last Administration Doses Remaining   lidocaine (URO-JET, GLYDO) 2 % urethral/mucosal gel 1 application None recorded 15          Past Medical History:   Diagnosis Date   • Acute urinary retention 09/12/2022   • Alcoholism ([facility] )    • Anemia    • Bladder cancer (HCC)    • Cerebral artery aneurysm    • Change in mental state     last assessed 5/18/15; resolved 10/27/15   • Chronic kidney disease    • COVID-19     2022 not hospitalized fully recovered   • Diabetes mellitus ([facility] [address] )    • Drug dependence ([facility]  )    • Fatigue     last assessed 1/26/15; resolved 5/24/16   • Hepatitis C    • Hospital discharge follow-up 12/21/2018   • Hx of liver transplant [facility])    • Hydronephrosis of right kidney    • Kidney disease    • 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[facility] [facility] )    • Liver cirrhosis ([facility]  )    • Liver transplant recipient [facility])    • Renal disorder    • Stroke (cerebrum) ([facility] )    • Stroke ([facility] )     diff short term memory   • Subdural hygroma     2/27/14; resolved 7/28/15   • Thrombocytopenia ([facility] ) 09/20/2017       Past Surgical History:   Procedure Laterality Date   • BRAIN SURGERY  02/12/2014    left frontotemporal 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   • CATARACT EXTRACTION     • CHOLECYSTECTOMY     • CYSTOSCOPY  11/30/2022    [place]   • FL LUMBAR PUNCTURE DIAGNOSTIC  12/14/2018   • FL RETROGRADE PYELOGRAM  1/18/2023   • HEPATITIS B SURFACE AB QN,LIVER TRANSPLANT (HISTORICAL)     • IR NEPHROSTOMY TUBE CHECK/CHANGE/REPOSITION/REINSERTION/UPSIZE  07/29/2022   • IR NEPHROSTOMY TUBE CHECK/CHANGE/REPOSITION/REINSERTION/UPSIZE  08/31/2022   • IR NEPHROSTOMY TUBE CHECK/CHANGE/REPOSITION/REINSERTION/UPSIZE  10/07/2022   • IR NEPHROSTOMY TUBE CHECK/CHANGE/REPOSITION/REINSERTION/UPSIZE  12/3/2022   • IR NEPHROSTOMY TUBE PLACEMENT  05/28/2022   • IR PICC LINE  12/14/2018   • IR PORT PLACEMENT  06/23/2022   • LIVER TRANSPLANTATION     • NEPHROSTOMY TUBE CHANGE N/A 1/18/2023    Procedure: Removal of  percutaneous nephroureteral catheter;  Surgeon: Johnathan Matute MD;  Location: [facility];  Service: Urology   • CA CYSTO BLADDER W/URETERAL CATHETERIZATION Right 1/18/2023    Procedure: URETHRAL DILATION, CYSTOGRAM, CYSTOSCOPY, RIGHT RETEROGRADE PYELOGRAM, [phone], COMPLEX CHICAS PLACEMENT;  Surgeon: Johnathan Matute MD;  Location: [facility];  Service: Urology   • ROTATOR CUFF REPAIR     • SHOULDER SURGERY     • TRANSURETHRAL RESECTION OF BLADDER TUMOR N/A 2022    Procedure: TRANSURETHRAL RESECTION OF BLADDER TUMOR (TURBT); Surgeon: Hortensia Baugh MD;  [facility]: [facility];  Service: Urology       Family History   Problem Relation Age of Onset   • Hypertension Mother         benign essential    • Lung cancer Father    • Diabetes Sister    • Cancer Brother    • Stroke Other         cva - due to embolism of cerebra artery      I have reviewed and agree with the history as documented  E-Cigarette/Vaping   • E-Cigarette Use Never User      E-Cigarette/Vaping Substances   • Nicotine No    • THC No    • CBD No    • Flavoring No    • Other No    • Unknown No      Social History     Tobacco Use   • Smoking status: Former     Packs/day: 1 00     Types: Cigarettes     Quit date:      Years since quittin 0   • Smokeless tobacco: Never   • Tobacco comments:     former smoker per allscripts    Vaping Use   • Vaping Use: Never used   Substance Use Topics   • Alcohol use: Not Currently   • Drug use: No     Comment: remotely quit drug use per allscripts         Review of Systems   Constitutional: Negative for chills and fever  HENT: Negative for ear pain and sore throat  Eyes: Negative for pain and visual disturbance  Respiratory: Negative for cough and shortness of breath  Cardiovascular: Negative for chest pain and palpitations  Gastrointestinal: Negative for abdominal pain and vomiting  Genitourinary: Negative for enuresis and hematuria  Musculoskeletal: Negative for arthralgias and back pain  Skin: Negative for color change and rash  Neurological: Negative for seizures and syncope  All other systems reviewed and are negative        Physical Exam  ED Triage Vitals [23 1027]   Temperature Pulse Respirations Blood Pressure SpO2   (!) 95 8 °F (35 4 °C) 80 18 142/64 99 %      Temp Source Heart Rate Source Patient Position - Orthostatic VS BP Location FiO2 (%)   Axillary Monitor Lying Right arm --      Pain Score       9             Orthostatic Vital Signs  Vitals:    01/20/23 1027 01/20/23 1430   BP: 142/64 145/70   Pulse: 80 79   Patient Position - Orthostatic VS: Lying        Physical Exam  Vitals and nursing note reviewed  Constitutional:       General: He is not in acute distress  Appearance: He is well-developed  HENT:      Head: Normocephalic and atraumatic  Eyes:      Extraocular Movements: Extraocular movements intact  Conjunctiva/sclera: Conjunctivae normal    Cardiovascular:      Rate and Rhythm: Normal rate and regular rhythm  Heart sounds: No murmur heard  Pulmonary:      Effort: Pulmonary effort is normal  No respiratory distress  Breath sounds: Normal breath sounds  Abdominal:      Palpations: Abdomen is soft  Tenderness: There is no abdominal tenderness  Genitourinary:     Comments: No erythema or blood present at the urethral meatus  Penis is nontender to palpation and no erythema is present  No hematuria present in his urine bag  Musculoskeletal:      Cervical back: Neck supple  Right lower leg: No edema  Left lower leg: No edema  Skin:     General: Skin is warm and dry  Capillary Refill: Capillary refill takes less than 2 seconds  Neurological:      Mental Status: He is alert  Sensory: No sensory deficit  Motor: No weakness  Psychiatric:         Mood and Affect: Mood normal          ED Medications  Medications   oxyCODONE (ROXICODONE) IR tablet 5 mg (5 mg Oral Given 1/20/23 1242)   oxybutynin (DITROPAN) tablet 5 mg (5 mg Oral Given 1/20/23 1426)   lidocaine (URO-JET) 2 % urethral/mucosal gel 1 application (1 application Urethral Given 1/20/23 1426)       Diagnostic Studies  Results Reviewed     None                 No orders to display         Procedures  Procedures      ED Course  ED Course as of 01/20/23 2005 Fri Jan 20, 2023   [facility] urology   [id number] Urology does not want to remove flores today  Identification of Seniors at [address] [place] [address] Most Recent Value   (ISAR) Identification of Seniors at Risk    Before the illness or injury that brought you to the Emergency, did you need someone to help you on a regular basis? 1 Filed at: 01/20/2023 1028   In the last 24 hours, have you needed more help than usual? 0 Filed at: 01/20/2023 1028   Have you been hospitalized for one or more nights during the past 6 months? 1 Filed at: 01/20/2023 1028   In general, do you see well? 1 Filed at: 01/20/2023 1028   In general, do you have serious problems with your memory? 1 Filed at: 01/20/2023 1028   Do you take more than three different medications every day? 1 Filed at: 01/20/2023 1028   ISAR Score 5 Filed at: 01/20/2023 1028                              Medical Decision Making  Patient is a 75-year-old male with a past medical history of bladder cancer, DM, liver transplant secondary to cirrhosis presented with urinary catheter leakage and penile pain  Patient's differential includes nonpatent Flores, urethral spasm, and postop pain  Flores will be irrigated to ensure that it is patent and neurology will be consulted  Oxycodone will be ordered for pain control  Urology suggested the pain and leakage is secondary to muscle spasms  They recommended flores irrigation to ensure patency, oxybutinin, urethral lidocaine, a prescription for hydrocodone, and for him to follow-up with Urology at his appointment on Thursday  They did not want to remove the flores  There was mild leakage of fluid from penis with irrigation  Urology stated this was normal and his flores is in place and draining  The patient's pain had completely resolved following the oxycodone  The patient was agreeable to the plan  Urology sent a prescription for pain medication to his pharmacy  He was encouraged to keep his upcoming Urology appointment  He was given return precautions and discharged from the ED       CKD (chronic kidney disease): chronic illness or injury  History of bladder cancer: chronic illness or injury  Leakage of indwelling urethral catheter, initial encounter [facility]): acute illness or injury  Urethral catheter mechanical complication, initial encounter [facility]): acute illness or injury  Urethral pain: acute illness or injury  Amount and/or Complexity of Data Reviewed  External Data Reviewed: notes  ECG/medicine tests: ordered  Discussion of management or test interpretation with external provider(s): Urology    Risk  Prescription drug management  Disposition  Final diagnoses:   Urethral catheter mechanical complication, initial encounter ([place] [address] )   Leakage of indwelling urethral catheter, initial encounter ([place] [address] )   Urethral pain   History of bladder cancer   CKD (chronic kidney disease)     Time reflects when diagnosis was documented in both MDM as applicable and the Disposition within this note     Time User Action Codes Description Comment    1/20/2023 12:39 PM Shaheen Galvan Add [D06 482B] Urethral catheter mechanical complication, initial encounter ([place] [address] )     1/20/2023 12:39 PM Shaheen Galvan Add [T83 031A] Leakage of indwelling urethral catheter, initial encounter ([place] [address] )     1/20/2023 12:47 PM Aleida Bridges Add [N32 89] Bladder spasm     1/20/2023  4:04 PM Shaheen Galvan Add [R39 89] Urethral pain     1/20/2023  4:27 PM Shaheen Galvan Add [Z85 51] History of bladder cancer     1/20/2023  4:40 PM Shaheen Galvan Add [N18 9] CKD (chronic kidney disease)       ED Disposition     ED Disposition   Discharge    Condition   Stable    Date/Time   Fri Jan 20, 2023  4:27 PM    [address] discharge to home/self care                 Follow-up Information     Follow up With Specialties Details Why Contact Info Additional [address] [facility]   [address] [facility] [address] [place] [id number]  [address] [facility] [place], [address], [place], [address]          Discharge Medication List as of 1/20/2023  4:28 PM      START taking these medications    Details   HYDROcodone-acetaminophen (Norco) 5-325 mg per tablet Take 1 tablet by mouth every 6 (six) hours as needed for pain Max Daily Amount: 4 tablets, Starting Fri 1/20/2023, Normal      !! oxybutynin (DITROPAN) 5 mg tablet Take 1 tablet (5 mg total) by mouth 3 (three) times a day for 10 days, Starting Fri 1/20/2023, Until Mon 1/30/2023, Normal       !! - Potential duplicate medications found  Please discuss with provider        CONTINUE these medications which have NOT CHANGED    Details   acetaminophen (TYLENOL) 325 mg tablet Take 3 tablets (975 mg total) by mouth every 8 (eight) hours, Starting Thu 9/22/2022, No Print      Alcohol Swabs (B-D SINGLE USE SWABS REGULAR) PADS USE 2-3 TIMES DAILY AS NEEDED, Historical Med      Blood Glucose Calibration (OT ULTRA/FASTTK CNTRL SOLN) SOLN USE AS DIRECTED, Normal      cefuroxime (CEFTIN) 250 mg tablet Take 1 tablet (250 mg total) by mouth every 12 (twelve) hours for 3 days, Starting Wed 1/18/2023, Until Sat 1/21/2023, Normal      clopidogrel (PLAVIX) 75 mg tablet Take 1 tablet (75 mg total) by mouth daily Do not start before January 23, 2023 , Starting Mon 1/23/2023, Normal      Comfort EZ Pen Needles 32G X 4 MM MISC USE 3-4 AS DIRECTED, Normal      Continuous Blood Gluc  (FreeStyle Frank 14 Day [place]) NATALIA Use 1 Device continuous, Starting Fri 6/3/2022, Normal      Continuous Blood Gluc Sensor (FreeStyle Frank 14 Day Sensor) MISC Use 1 Device 4 (four) times a day, Starting Fri 6/3/2022, Normal      Incontinence Supply Disposable (Incontinence Brief Medium) MISC Use 4 (four) times a day, Starting Fri 8/19/2022, Until Tue 12/6/2022, Print      insulin detemir (Levemir FlexTouch) 100 Units/mL injection pen Inject 15 Units under the skin daily at bedtime, Starting Mon 9/26/2022, Normal      Lancet Devices (Lancing Device) MISC USE AS DIRECTED, Normal      loperamide (IMODIUM) 2 mg capsule Take 1 capsule (2 mg total) by mouth 2 (two) times a day as needed for diarrhea [facility] por cynthia si tiene diarrea, Starting Fri 8/26/2022, Normal      Nutritional Supplements (Glucerna Shake) LIQD Take 1 Bottle by mouth 3 (three) times a day, Starting Fri 8/19/2022, Print      OneTouch Ultra test strip TEST 3-4 TIMES A DAY, Normal      !! oxybutynin (DITROPAN) 5 mg tablet Take 1 tablet (5 mg total) by mouth 2 (two) times a day as needed (bladder spasms), Starting Sun 12/4/2022, Normal      pregabalin (LYRICA) 75 mg capsule Take 1 capsule (75 mg total) by mouth 2 (two) times a day for 10 days, Starting Tue 11/1/2022, Until Wed 1/18/2023, Normal      rosuvastatin (CRESTOR) 40 MG tablet TAKE ONE (1) TABLET BY MOUTH DAILY, Normal      sodium chloride Historical Med      tacrolimus (PROGRAF) 1 mg capsule TAKE 1 CAPSULE (1 MG TOTAL) BY MOUTH EVERY 12 (TWELVE) HOURS, Starting Tue 9/27/2022, Normal      tamsulosin (FLOMAX) 0 4 mg Take 1 capsule (0 4 mg total) by mouth daily with dinner, Starting Mon 10/24/2022, Until Sun 1/22/2023, Normal      temazepam (RESTORIL) 7 5 mg capsule TAKE 2 CAPSULES (15 MG TOTAL) BY MOUTH DAILY AT BEDTIME AS NEEDED FOR SLEEP, Starting Tue 12/20/2022, Normal      zolpidem (AMBIEN) 5 mg tablet Take 5 mg by mouth daily at bedtime as needed, Starting Mon 12/5/2022, Historical Med       !! - Potential duplicate medications found  Please discuss with provider  No discharge procedures on file  PDMP Review       Value Time User    PDMP Reviewed  Yes 12/20/2022  2:08 PM Eloy Gao,            ED Provider  Attending physically available and evaluated Elana Reese  I managed the patient along with the ED Attending      Electronically Signed by         Alicia Barraza MD  01/20/23 2005

## 2023-01-20 NOTE — TELEPHONE ENCOUNTER
Pt's MC called stating that she received a call this morning regarding coming in for a flores check         Chris Kirkland can be reached at [phone]

## 2023-01-20 NOTE — TELEPHONE ENCOUNTER
----- Message from Zeenat Slade sent at 1/20/2023  9:42 AM EST -----  Regarding: Pain   Contact: [phone]  Hello,    We called last night regarding Rogers’s pain and leaking through the meatus and not going into the flores  Spoke to the nurse and she scheduled him for thru day  At this point he’s doubled over in pain  Vester Blight is taking him to the ED         Andria Lozano

## 2023-01-20 NOTE — ED NOTES
Patient does not want IV attempts in the arm, wants Port accessed - ED resident made aware and shall change pain medication to PO at this time       Trina Youngblood RN  01/20/23 4009

## 2023-01-20 NOTE — DISCHARGE INSTRUCTIONS
You were seen in the Emergency Department today for pain and leakage following urethral flores placement  Please keep your upcoming urology appointment on Thursday  Please return to the Emergency Department if you experience worsening of your current symptoms, fever, chills, chest pain, shortness of breath, blood in your urine, or any other concerning symptoms

## 2023-01-20 NOTE — ED ATTENDING ATTESTATION
1/20/2023  Myra Vásquez DO, saw and evaluated the patient  I have discussed the patient with the resident/non-physician practitioner and agree with the resident's/non-physician practitioner's findings, Plan of Care, and MDM as documented in the resident's/non-physician practitioner's note, except where noted  All available labs and Radiology studies were reviewed  I was present for key portions of any procedure(s) performed by the resident/non-physician practitioner and I was immediately available to provide assistance  At this point I agree with the current assessment done in the Emergency Department  I have conducted an independent evaluation of this patient a history and physical is as follows:    74 yom with recent flores catheter placed by urologist related to bladder malignancy no reporting suprapubic pain  No penile pain       Past Medical History:   Diagnosis Date   • Acute urinary retention 09/12/2022   • Alcoholism ([facility] )    • Anemia    • Bladder cancer ([facility] [address] )    • Cerebral artery aneurysm    • Change in mental state     last assessed 5/18/15; resolved 10/27/15   • Chronic kidney disease    • COVID-19 2022 not hospitalized fully recovered   • Diabetes mellitus ([facility] [address] )    • Drug dependence ([facility] [address] )    • Fatigue     last assessed 1/26/15; resolved 5/24/16   • Hepatitis C    • Hospital discharge follow-up 12/21/2018   • Hx of liver transplant [facility])    • Hydronephrosis of right kidney    • Kidney disease    • 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[facility] [address] )    • Liver cirrhosis ([facility] )    • Liver transplant recipient [facility])    • Renal disorder    • Stroke (cerebrum) ([facility] [address] )    • Stroke ([facility] [address] )     diff short term memory   • Subdural hygroma     2/27/14; resolved 7/28/15   • Thrombocytopenia ([facility] ) 09/20/2017     /64 (BP Location: Right arm)   Pulse 80   Temp (!) 97 4 °F (36 3 °C) (Oral)   Resp 18   SpO2 99%   NAD, A&Ox4, no resp distress, RRR, abd soft w suprapubic TTP, penis NT w/o swelling +flores catheter in place, urine leaking at meatus, nonbloody, normal appearing urine in bag  Irrigation attempted and saline leaked around the meatus  Advised to not change flores by uro as it was difficult to initially insert  Urology recommended lidocaine and oxybutynin  Will request bedside urology evaluation for additional recommendations         ED Course         Critical Care Time  Procedures

## 2023-01-20 NOTE — TELEPHONE ENCOUNTER
Reason for Disposition  • Lower abdominal pain or distention    Answer Assessment - Initial Assessment Questions  1  SYMPTOMS: "What symptoms are you concerned about?"      "His urine is barely going in to the bag at all, it's all coming out around the tube "    2  ONSET:  "When did the symptoms start?"      Last evening     3  FEVER: "Is there a fever?" If Yes, ask: "What is the temperature, how was it measured, and when did it start?"      Denies     4  ABDOMINAL PAIN: "Is there any abdominal pain?" (e g , Scale 1-10; or mild, moderate, severe)      Moderate pain over bladder that increases with urination    5  URINE COLOR: "What color is the urine?"  "Is there blood present in the urine?" (e g , clear, yellow, cloudy, tea-colored, blood streaks, bright red)      Light yellow    6  ONSET: "When was the catheter inserted?"      1/18/23    7  OTHER SYMPTOMS: "Do you have any other symptoms?" (e g , back pain, bad urine odor)        Moderate pain under scrotum    Patient's to pts daughter and then also spoke to patient's wife      Protocols used: URINARY CATHETER SYMPTOMS AND QUESTIONS-ADULT-AH

## 2023-01-20 NOTE — TELEPHONE ENCOUNTER
Regarding: procedure yesterday/flores leaking  ----- Message from Kim Graves sent at 1/19/2023  7:45 PM EST -----  Pt daughter called "Junior Dubose just had a procedure yesterday and he has a flores   Every time he urinates it is leaking and not going into the bag "

## 2023-01-20 NOTE — TELEPHONE ENCOUNTER
Called and spoke with patient's wife BAKARI JOSEPH  Patient experiencing bladder pain with leakage since the procedure on 1/18/2023  Reviewed with BAKARI JOSEPH to begin Ditropan for the spasms and to avoid constipation  Patient needs to hydrate well with water  BAKARI SARAHTERENCE states her  is very stubborn and will not drink water because of the leakage  She will administer Tylenol and the Ditropan for the bladder pain  BAKARI IM GESÄUSE asked about home health because she is very overwhelmed taking care of her  and her mother  [id number] [facility] and spoke with ROSENDO  Discussed the situation with ROSENDO and since the catheter is being removed on Wednesday no need for skilled service  Just received My Chart message family is taking patient to the ED for bladder pain

## 2023-01-20 NOTE — CONSULTS
Consults: UROLOGY  Jimmy Rendon 74 y o  male [id number]   Unit/Bed #: ED 18  Encounter: [id number]        Assessment  & Plan  :  Urinary catheter complication:  -Webb catheter in place draining clear yellow urine  -ER staff able to manually flush urethral Webb catheter  But did note leakage from around catheter  Due to being able to flush fluid into the catheter, this assures me that the urethral Webb catheter is patent  Leakage from around the catheter with manual irrigation represents bladder spasm   -Patient currently comfortable at this time  Reports that sometimes he has pain in his penis all the time other times only when he urinates  Discussed with patient that when he has a sensation that he needs to void this is a bladder spasm  As he currently has a urethral Webb catheter in place that allows his bladder to drain continuously  Reports that when he gets this sensation he typically gets pain in the tip of his penis and leakage from around the catheter   -Discussed with patient and family would recommend oxybutynin 5 mg up to 3 times daily on outpatient  Is to manage bladder spasms  Discussed side effect profile including delirium, dry eye dry mouth and constipation  Discussed staying well-hydrated and also possible prescription or over-the-counter stool softeners  As constipation can make stent colic and bladder spasms worse  -Patient cleared for discharge from urologic standpoint with outpatient follow-up  Patient currently scheduled for close follow-up next week for Webb catheter removal     No further  intervention required this admission  Urology will sign off  We are always available for additional questions or concerns in regards to this patient  Subjective :    Jimmy Rendon  is a 74 y o  male  known to our practice with a significant past medical history of alcohol cirrhosis and prior liver transplantation over 20 years ago    Being seen by previous urologist in 2019 for bladder issues  CT scan in May of 2021 did recent question for bladder wall thickening  He did not see urologist in these concerns  Developed gross hematuria and May of 2022, CT scan revealed concern for bladder lesions around the right and distal ureter  Resection the bladder was performed and revealed muscle invasive bladder cancer  Patient discussed at multidisciplinary tumor board and was offered chemotherapy and radiation  Patient required nephrostomy tube due to inability to access ureter at time of initial resection later converted to PCNU which was capped  Now s/p cystoscopy removal of PCNU with exchange of ureteral stent dilation of urethral stricture and insertion of urethral Webb catheter yesterday with Dr Don Vela presenting with penile pain and discomfort  Patient reports that he typically gets pain in his penis when he urinates  Also with leakage of urethral Webb catheter which started yesterday postoperatively once he was home  Denies any fevers, chills, nausea, vomiting  No hematuria  Allergies   Allergen Reactions   • Tequin [Gatifloxacin] Rash   • Lipitor [Atorvastatin] Hives and Other (See Comments)     Rash and itching   • Ciprofloxacin Rash   • Iohexol Hives and Other (See Comments)     Contraindication with rosuvastatin      • Iv Contrast [Iodinated Contrast Media] Rash and Other (See Comments)     Unknown   • Levofloxacin Rash      Current Outpatient Medications   Medication Instructions   • acetaminophen (TYLENOL) 975 mg, Oral, Every 8 hours scheduled   • Alcohol Swabs (B-D SINGLE USE SWABS REGULAR) PADS USE 2-3 TIMES DAILY AS NEEDED   • Blood Glucose Calibration (OT ULTRA/FASTTK CNTRL SOLN) SOLN USE AS DIRECTED   • cefuroxime (CEFTIN) 250 mg, Oral, Every 12 hours scheduled   • [START ON 1/23/2023] clopidogrel (PLAVIX) 75 mg, Oral, Daily   • Comfort EZ Pen Needles 32G X 4 MM MISC USE 3-4 AS DIRECTED   • Continuous Blood Gluc  (FreeStyle Frank 14 Day [place]) NATALIA 1 Device, Does not apply, Continuous   • Continuous Blood Gluc Sensor (FreeStyle Frank 14 Day Sensor) MISC 1 Device, Does not apply, 4 times daily   • Incontinence Supply Disposable (Incontinence Brief Medium) MISC Does not apply, 4 times daily   • Lancet Devices (Lancing Device) MISC USE AS DIRECTED   • Levemir FlexTouch 15 Units, Subcutaneous, Daily at bedtime   • loperamide (IMODIUM) 2 mg, Oral, 2 times daily PRN, [place] cramen tableta dos veces por cynthia si tiene diarrea   • Nutritional Supplements (Glucerna Shake) LIQD 1 Bottle, Oral, 3 times daily   • OneTouch Ultra test strip TEST 3-4 TIMES A DAY   • oxybutynin (DITROPAN) 5 mg, Oral, 2 times daily PRN   • oxybutynin (DITROPAN) 5 mg, Oral, 3 times daily   • pregabalin (LYRICA) 75 mg, Oral, 2 times daily   • rosuvastatin (CRESTOR) 40 MG tablet TAKE ONE (1) TABLET BY MOUTH DAILY   • sodium chloride    • tacrolimus (PROGRAF) 1 mg, Oral, Every 12 hours   • tamsulosin (FLOMAX) 0 4 mg, Oral, Daily with dinner   • temazepam (RESTORIL) 15 mg, Oral, Daily at bedtime PRN   • zolpidem (AMBIEN) 5 mg, Oral, Daily at bedtime PRN      Past Medical History:   Diagnosis Date   • Acute urinary retention 09/12/2022   • [facility]    • Anemia    • Bladder cancer (HCC)    • Cerebral artery aneurysm    • Change in mental state     last assessed 5/18/15; resolved 10/27/15   • Chronic kidney disease    • COVID-19 2022 not hospitalized fully recovered   • Diabetes mellitus ([facility] )    • Drug dependence ([facility] )    • Fatigue     last assessed 1/26/15; resolved 5/24/16   • Hepatitis C    • Hospital discharge follow-up 12/21/2018   • Hx of liver transplant ([facility] )    • Hydronephrosis of right kidney    • Kidney disease    • 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HCC)    • Liver cirrhosis (HCC)    • Liver transplant recipient [facility])    • Renal disorder    • Stroke (cerebrum) ([facility] )    • Stroke ([facility] )     diff short term memory   • Subdural hygroma     2/27/14; resolved 7/28/15   • Thrombocytopenia ([facility] ) 09/20/2017     Past Surgical History:   Procedure Laterality Date   • BRAIN SURGERY  02/12/2014    left frontotemporal 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   • CATARACT EXTRACTION     • CHOLECYSTECTOMY     • CYSTOSCOPY  11/30/2022    [place]   • FL LUMBAR PUNCTURE DIAGNOSTIC  12/14/2018   • FL RETROGRADE PYELOGRAM  1/18/2023   • HEPATITIS B SURFACE AB QN,LIVER TRANSPLANT (HISTORICAL)     • IR NEPHROSTOMY TUBE CHECK/CHANGE/REPOSITION/REINSERTION/UPSIZE  07/29/2022   • IR NEPHROSTOMY TUBE CHECK/CHANGE/REPOSITION/REINSERTION/UPSIZE  08/31/2022   • IR NEPHROSTOMY TUBE CHECK/CHANGE/REPOSITION/REINSERTION/UPSIZE  10/07/2022   • IR NEPHROSTOMY TUBE CHECK/CHANGE/REPOSITION/REINSERTION/UPSIZE  12/3/2022   • IR NEPHROSTOMY TUBE PLACEMENT  05/28/2022   • IR PICC LINE  12/14/2018   • IR PORT PLACEMENT  06/23/2022   • LIVER TRANSPLANTATION     • NEPHROSTOMY TUBE CHANGE N/A 1/18/2023    Procedure: Removal of  percutaneous nephroureteral catheter;  Surgeon: Arlyn tSanley MD;  Location: [facility];  Service: Urology   • ME CYSTO BLADDER W/URETERAL CATHETERIZATION Right 1/18/2023    Procedure: URETHRAL DILATION, CYSTOGRAM, CYSTOSCOPY, RIGHT RETEROGRADE PYELOGRAM, [address], COMPLEX CHICAS PLACEMENT;  Surgeon: Arlyn Stanley MD;  Location: [facility];  Service: Urology   • ROTATOR CUFF REPAIR     • SHOULDER SURGERY     • TRANSURETHRAL RESECTION OF BLADDER TUMOR N/A 05/26/2022    Procedure: TRANSURETHRAL RESECTION OF BLADDER TUMOR (TURBT);   Surgeon: Arlyn Stanley MD;  [facility]: [facility];  Service: Urology     Family History   Problem Relation Age of Onset   • Hypertension Mother         benign essential    • Lung cancer Father    • Diabetes Sister    • Cancer Brother    • Stroke Other         cva - due to embolism of cerebra artery      Social History     Socioeconomic History   • Marital status: Single     Spouse name: Not on file   • Number of children: Not on file   • Years of education: less then high school   • Highest education level: Not on file   Occupational History   • Occupation: physical disability    Tobacco Use   • Smoking status: Former     Packs/day: 1 00     Types: Cigarettes     Quit date:      Years since quittin 0   • Smokeless tobacco: Never   • Tobacco comments:     former smoker per allscripts    Vaping Use   • Vaping Use: Never used   Substance and Sexual Activity   • Alcohol use: Not Currently   • Drug use: No     Comment: remotely quit drug use per allscripts    • Sexual activity: Not Currently   Other Topics Concern   • Not on file   Social History Narrative    ** Merged History Encounter **         ** Merged History Encounter **         Caffeine use   Living with significant other , girlfriend and daughter      Social Determinants of Health     Financial Resource Strain: Not on file   Food Insecurity: No Food Insecurity   • Worried About Running Out of Food in the Last Year: Never true   • Ran Out of Food in the Last Year: Never true   Transportation Needs: No Transportation Needs   • Lack of Transportation (Medical): No   • Lack of Transportation (Non-Medical): No   Physical Activity: Not on file   Stress: Not on file   Social Connections: Not on file   Intimate Partner Violence: Not on file   Housing Stability: Low Risk    • Unable to Pay for Housing in the Last Year: No   • Number of Places Lived in the Last Year: 1   • Unstable Housing in the Last Year: No        Review of Systems     Objective     Physical Exam  Constitutional:       General: He is not in acute distress  Appearance: He is normal weight  He is not ill-appearing, toxic-appearing or diaphoretic  HENT:      Head: Normocephalic and atraumatic  Right Ear: External ear normal       Left Ear: External ear normal       Nose: Nose normal       Mouth/Throat:      Pharynx: Oropharynx is clear  Eyes:      General: No scleral icterus       Conjunctiva/sclera: Conjunctivae normal    Cardiovascular:      Rate and Rhythm: Normal rate and regular rhythm  Pulses: Normal pulses  Heart sounds: No murmur heard  No friction rub  No gallop  Pulmonary:      Effort: Pulmonary effort is normal  No respiratory distress  Breath sounds: No wheezing, rhonchi or rales  Abdominal:      General: Bowel sounds are normal    Genitourinary:     Comments: Webb catheter in place draining clear yellow urine  Musculoskeletal:      Cervical back: Normal range of motion  Neurological:      Mental Status: He is alert  Mental status is at baseline                  Imaging:     tract imaging this admission  Labs:  Lab Results   Component Value Date    SODIUM 137 01/18/2023    K 4 5 01/18/2023     (H) 01/18/2023    CO2 22 01/18/2023    BUN 46 (H) 01/18/2023    CREATININE 2 48 (H) 01/18/2023    GLUC 95 01/18/2023    CALCIUM 7 7 (L) 01/18/2023         Lab Results   Component Value Date    WBC 5 69 12/04/2022    HGB 9 1 (L) 12/04/2022    HCT 29 7 (L) 12/04/2022    MCV 78 (L) 12/04/2022     (L) 12/04/2022         VTE Pharmacologic Prophylaxis: none  VTE Mechanical Prophylaxis: sequential compression device     Colleen Garcia PA-C

## 2023-01-23 ENCOUNTER — HOSPITAL ENCOUNTER (OUTPATIENT)
Dept: RADIOLOGY | Facility: HOSPITAL | Age: 73
Discharge: HOME | End: 2023-01-23

## 2023-01-23 ENCOUNTER — PATIENT OUTREACH (OUTPATIENT)
Dept: CASE MANAGEMENT | Facility: OTHER | Age: 73
End: 2023-01-23

## 2023-01-23 DIAGNOSIS — R91.1 SOLITARY PULMONARY NODULE: ICD-10-CM

## 2023-01-23 DIAGNOSIS — C67.9 MALIGNANT NEOPLASM OF URINARY BLADDER, UNSPECIFIED SITE (HCC): ICD-10-CM

## 2023-01-23 NOTE — PROGRESS NOTES
OSW placed supportive call to [facility] today  She stated she was thinking about this OSW and began to cry  [facility] shared her caregiving responsibilities are very very overwhelming right now, and Amber Mandujano will not allow her to bring anyone else in their home to help  She talked about his mood changes, his recent flores catheter issues which prompted an ED trip, the multiple appointments scheduled, and her other family members of whom she helps  [facility] visits her 79 y/o mother 3 x/week and provides her groceries, help to appointments and making sure her apartment is clean  She teafully stated her mother has dementia and is sad to think there may come a time when she may not remember her own daughter  She has 3 other siblings who do not share in the caregiving to their mother  Rosibel's daughter also requires supervision and assistance  She shared her her other daughter, Andria Lozano is in nursing school at [employer] and she will help care for her grandchildren  Reviewed support resources  She is open to a referral to [facility] for caregiver support  OSW had previously made a referral in October, however it does not appear this came to fruition  Explained this writer can place a referral to [facility] to assist with [place] Waiver program, however [facility] declined stating Amber Mandujano will not be receptive to this  OSW researched some additional supports via [employer]  Printed additional resources and will send to [facility]:     [facility]? (emotional support to families and patients virtually)  [facility] online and telephone support groups  Friend For [address]     Reviewed Rogers's upcoming appointments with her, as she did not have the correct dates on her end  Provided significant emotional support and validation of her feelings  OSW offered to increase supportive calls to her and she is appreciative

## 2023-01-25 ENCOUNTER — PROCEDURE VISIT (OUTPATIENT)
Dept: UROLOGY | Facility: CLINIC | Age: 73
End: 2023-01-25

## 2023-01-25 VITALS
DIASTOLIC BLOOD PRESSURE: 70 MMHG | RESPIRATION RATE: 20 BRPM | BODY MASS INDEX: 21.79 KG/M2 | WEIGHT: 123 LBS | HEIGHT: 63 IN | HEART RATE: 72 BPM | SYSTOLIC BLOOD PRESSURE: 110 MMHG

## 2023-01-25 DIAGNOSIS — N35.913 STRICTURE OF MEMBRANOUS URETHRA IN MALE, UNSPECIFIED STRICTURE TYPE: Primary | ICD-10-CM

## 2023-01-25 NOTE — PROGRESS NOTES
1/25/2023    Radha Crawford is a 74 y o  male  [id number]    Diagnosis:  Chief Complaint    urethral stricture         Patient presents for flores removal s/p urethral dilation on 1/18/2023 with Dr Rich Jarquin:  Stent exchange in 3 months  Procedure: Flores removed after deflation of intact balloon without incident  Patient tolerated procedure well      Vitals:    01/25/23 0921   BP: 110/70   Pulse: 72   Resp: 20   Weight: 55 8 kg (123 lb)   Height: 5' 3" (1 6 m)         Bert Almodovar RN

## 2023-01-25 NOTE — TELEPHONE ENCOUNTER
Called and left message for Keith Rivas to give office a call back to schedule patient for next stent exchange

## 2023-01-26 ENCOUNTER — TELEPHONE (OUTPATIENT)
Dept: HEMATOLOGY ONCOLOGY | Facility: CLINIC | Age: 73
End: 2023-01-26

## 2023-01-31 ENCOUNTER — PREP FOR PROCEDURE (OUTPATIENT)
Dept: INTERVENTIONAL RADIOLOGY/VASCULAR | Facility: CLINIC | Age: 73
End: 2023-01-31

## 2023-01-31 ENCOUNTER — OFFICE VISIT (OUTPATIENT)
Dept: HEMATOLOGY ONCOLOGY | Facility: CLINIC | Age: 73
End: 2023-01-31

## 2023-01-31 VITALS
TEMPERATURE: 98 F | HEIGHT: 63 IN | SYSTOLIC BLOOD PRESSURE: 115 MMHG | RESPIRATION RATE: 16 BRPM | OXYGEN SATURATION: 100 % | HEART RATE: 84 BPM | DIASTOLIC BLOOD PRESSURE: 60 MMHG | BODY MASS INDEX: 21.88 KG/M2 | WEIGHT: 123.5 LBS

## 2023-01-31 DIAGNOSIS — C67.0 MALIGNANT NEOPLASM OF TRIGONE OF URINARY BLADDER (HCC): ICD-10-CM

## 2023-01-31 DIAGNOSIS — D49.4 BLADDER TUMOR: Primary | ICD-10-CM

## 2023-01-31 DIAGNOSIS — R91.1 PULMONARY NODULE: Primary | ICD-10-CM

## 2023-01-31 NOTE — PROGRESS NOTES
Hematology Outpatient Follow - Up Note  Babs Gaming 74 y o  male MRN: @ Encounter: [id number]        Date:  1/31/2023        Assessment/ Plan:    29-year-old  male with complicated medical history including alcoholic cirrhosis of the liver, with subsequent liver transplant in 2010, hepatitis-C status post treatment, diabetes mellitus type 2, neuropathy, chronic kidney disease secondary to tacrolimus, coronary artery disease, nephrolithiasis was found to have gross hematuria in May 2022, cystoscopies showed at least 5 cm mass involving the right lateral posterior wall, ureterovesical junction on the trigone, could not be stented, status post right nephrostomy tube     Biopsy 5/26/22 showed 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     CT scan did not show any local or distant metastasis       Because of liver transplant he is not a good candidate for immunotherapy  He was treated with concurrent radiation therapy with gemcitabine 27 mg per m2 twice weekly x 2 weeks 8/12 - 5/7/43 complicated with dysuria, fatigue       After long discussion the patient and his wife decided not to proceed with any additional gemcitabine        He completed radiation therapy with 36 fractions to bladder and pelvis on 8/25/22  CT scan on 1/2023 showed increasing in the right upper lobe lung nodule 1 3 cm, few bilateral pulmonary nodules consistent with metastatic disease    To confirm we will ask IR for biopsy of the right upper lobe lung nodule and will send for molecular tests given the negative cystoscopy results        Labs and imaging studies are reviewed by ordering provider once results are available  If there are findings that need immediate attention, you will be contacted when results available  Discussing results and the implication on your healthcare is best discussed in person at your follow-up visit         HPI:    29-year-old  male seen initially 6/14/22 regarding Muscle invasive bladder cancer      He has a complicated medical history including history of hepatitis-C, cirrhosis of the liver with subsequent liver transplant in 2010, diabetes mellitus type 2, diabetic neuropathy, benign paroxysmal positional vertigo, subdural hygroma, chronic kidney disease grade 3-4, history of previous alcoholism      Presented with gross hematuria in May 2022      CT scan of the abdomen and pelvis showed moderate right-sided hydroureteronephrosis with ureteral calculus seen, bladder mass measuring 7 4 x 6 6 cm was possible soft tissue extension into the right ureterovesical junction  Status post cystoscopy with 5 cm mass involving the right side as well as the trigone unable to stand the right-sided hydronephrosis status post nephrostomy tube      Biopsy showed 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, he was not candidate for surgical resection because of comorbidities, liver transplant as well as advanced age      Because of liver transplant he is not a good candidate for immunotherapy    The patient felt to be candidate for concurrent radiation with low-dose gemcitabine  He was treated with concurrent radiation therapy with gemcitabine 27 mg per m2 twice weekly x 2 weeks 4/11 - 5/7/78 complicated with dysuria, fatigue      After long discussion the patient and his wife decided not to proceed with any additional gemcitabine finished radiation therapy on 8/25/2022    CAT scan on 1/10/2022 showed 9 mm right upper lobe nodule status post right percutaneous nephroureteral stent    CT scan in January 2023 showed increasing in the right upper lobe lung nodule to 1 3 cm, few new pulmonary nodules bilaterally measuring millimeters  Interval History:        Previous Treatment:         Test Results:    Imaging: CT chest abdomen pelvis wo contrast    Result Date: 1/27/2023  Narrative: CT CHEST, ABDOMEN AND PELVIS WITHOUT IV CONTRAST INDICATION:   R91 1: Solitary pulmonary nodule C67 9: Malignant neoplasm of bladder, unspecified  COMPARISON:  10/14/2022 TECHNIQUE: CT examination of the chest, abdomen and pelvis was performed without intravenous contrast  Axial, sagittal, and coronal 2D reformatted images were created from the source data and submitted for interpretation  Radiation dose length product (DLP) for this visit:  520 2 mGy-cm   This examination, like all CT scans performed in the [facility], was performed utilizing techniques to minimize radiation dose exposure, including the use of iterative reconstruction and automated exposure control  Enteric contrast was not administered  FINDINGS: CHEST LUNGS: Enlargement of the nodule in the right upper lobe with spiculated margins now measuring 1 1 x 1 5 cm, 3/43  Previously measured 0 5 x 0 9 cm  New noncalcified pulmonary nodule in the anterior right upper lobe measuring 3 mm on 3/58  New noncalcified pulmonary nodule the right upper lobe measuring 4 6 mm on 3/69  Noncalcified pulmonary nodule the right upper lobe on 3/103 measuring 4 mm without significant change  Nodule in the subpleural right lower lobe on 3/141 measuring 5 mm, not identified on the prior study  New noncalcified pulmonary nodule the left upper lobe on 3/62 measuring 3 mm  No pleural effusion or pneumothorax  No tracheal or endobronchial lesion  Few nonspecific bandlike opacities in the left lung  PLEURA:  Unremarkable  HEART/GREAT VESSELS: Mild coronary vascular calcifications  No thoracic aortic aneurysm  Atherosclerotic changes are present  MEDIASTINUM AND JOHN:  Unchanged precarinal lymph node measure 1 2 cm in short axis on 2/41 remaining mediastinal lymph nodes are present without CT size enlargement  CHEST WALL AND LOWER NECK:  Port-A-Cath in the right chest wall with tip extending into the superior atriocaval junction  Mild gynecomastia  ABDOMEN LIVER/BILIARY TREE:  Prior liver transplantation    No worrisome solid or cystic mass identified on this noncontrast study  GALLBLADDER:  Gallbladder is surgically absent  SPLEEN:  Unremarkable  PANCREAS:  Unremarkable  ADRENAL GLANDS:  Unremarkable  KIDNEYS/URETERS:  Atrophic right kidney with indwelling right-sided ureteric stent  Removal of the previously seen percutaneous nephrostomy tube  Thickening of the proximal right ureter surrounding the stent  STOMACH AND BOWEL:  Unremarkable  APPENDIX:  No findings to suggest appendicitis  ABDOMINOPELVIC CAVITY:  No ascites or intraperitoneal free air  Retroperitoneal lymph nodes are present without CT size enlargement  VESSELS:  Unremarkable for patient's age  PELVIS REPRODUCTIVE ORGANS:  Unremarkable for patient's age  URINARY BLADDER:  Webb catheter and distal right ureteric stent in the urinary bladder lumen  Urinary bladder is otherwise underdistended however there is some surrounding inflammatory changes  Underdistention diminishes diagnostic assessment  ABDOMINAL WALL/INGUINAL REGIONS:  Small bilateral fat-containing inguinal hernias  Nonspecific areas of infiltration in the periumbilical ventral subcutaneous soft tissues  OSSEOUS STRUCTURES:  Generalized osteopenia  Spinal and pelvic degenerative changes  Old healed right rib fractures  Chronic deformity of the inferior endplate at L2  Impression: 1  Enlargement of the known spiculated nodule the right upper lobe now measuring 1 1 x 1 5 cm  2   Development of several new pulmonary nodules as described above  Concerning for metastatic disease  3   Removal of the previous right-sided percutaneous nephroureteral stent and placement of a right-sided ureteric stent  Inflammation /inflammatory changes along the proximal right ureteric stent  4   Webb catheter decompressing urinary bladder lumen diminishing  diagnostic assessment however there are nonspecific adjacent infiltrative changes  5   No significant change in the prominent precarinal lymph node measuring 1 2 cm   This study was marked significant in Epic  Workstation performed: [id number]     FL retrograde pyelogram    Result Date: 1/20/2023  Narrative: RIGHT RETROGRADE PYELOGRAM INDICATION:       Hydronephrosis of right kidney [N13 30]   COMPARISON: CT of the chest, abdomen and pelvis from 10/14/2022  IMAGES:  4 FLUOROSCOPY TIME:   40 sec CONTRAST: 20 mL of iohexol (OMNIPAQUE) FINDINGS: Fluoroscopic guidance provided for retrograde pyelogram  The 1st image demonstrates a catheter with its tip in the region of the right ureteropelvic junction  The right collecting system is opacified  There is a possible filling defect in one of the mid pole calyces  On the 2nd image a ureteral stent has been advanced into one of the upper pole calyces  The 3rd image demonstrates the distal tip of the right ureteral stent projecting over the pelvis  A cystoscope projects over the region of the urinary bladder  Calcifications are seen in the region of the prostate gland  On the 4th image the distal tip of the right ureteral stent is again seen in the region of the urinary bladder  There is a Webb catheter with an inflated balloon within the urinary bladder with small amount of contrast material around it  Osseous and soft tissue detail limited by technique  Impression: Fluoroscopic guidance provided for right retrograde pyelogram  Please see procedure report for further details   Workstation performed: [id number]       Labs:   Lab Results   Component Value Date    WBC 5 69 12/04/2022    HGB 9 1 (L) 12/04/2022    HCT 29 7 (L) 12/04/2022    MCV 78 (L) 12/04/2022     (L) 12/04/2022     Lab Results   Component Value Date     12/17/2015    K 4 5 01/18/2023     (H) 01/18/2023    CO2 22 01/18/2023    ANIONGAP 13 12/17/2015    BUN 46 (H) 01/18/2023    CREATININE 2 48 (H) 01/18/2023    GLUCOSE 179 (H) 08/05/2022    GLUF 95 01/18/2023    CALCIUM 7 7 (L) 01/18/2023    CORRECTEDCA 10 1 12/01/2022    AST 27 12/01/2022    ALT 22 12/01/2022 ALKPHOS 75 12/01/2022    PROT 7 7 12/15/2015    BILITOT 0 51 12/15/2015    EGFR 24 01/18/2023       Lab Results   Component Value Date    IRON 26 (L) 10/11/2022    TIBC 134 (L) 10/11/2022    FERRITIN 533 (H) 10/11/2022       Lab Results   Component Value Date    TXHIESWI81 904 (H) 12/16/2014         ROS: Review of Systems   Constitutional: Negative  Negative for appetite change, chills, diaphoresis, fatigue, fever and unexpected weight change  HENT:   Negative for hearing loss, lump/mass, mouth sores, nosebleeds, sore throat, trouble swallowing and voice change  Eyes: Negative  Negative for eye problems and icterus  Respiratory: Negative  Negative for chest tightness, cough, hemoptysis and shortness of breath  Cardiovascular: Negative for chest pain and leg swelling  Gastrointestinal: Negative for abdominal distention, abdominal pain, blood in stool, constipation, diarrhea and nausea  Endocrine: Negative  Genitourinary: Negative for dysuria, frequency, hematuria and pelvic pain  Musculoskeletal: Negative  Negative for arthralgias, back pain, flank pain, gait problem, myalgias and neck stiffness  Skin: Negative for itching and rash  Neurological: Negative for dizziness, gait problem, headaches, light-headedness, numbness and speech difficulty  Hematological: Negative for adenopathy  Does not bruise/bleed easily  Psychiatric/Behavioral: Negative for confusion, decreased concentration, depression and sleep disturbance  The patient is not nervous/anxious  Current Medications: Reviewed  Allergies: Reviewed  PMH/FH/SH:  Reviewed      Physical Exam:    Body surface area is 1 58 meters squared      Wt Readings from Last 3 Encounters:   01/31/23 56 kg (123 lb 8 oz)   01/25/23 55 8 kg (123 lb)   01/18/23 55 9 kg (123 lb 3 8 oz)        Temp Readings from Last 3 Encounters:   01/31/23 98 °F (36 7 °C)   01/20/23 (!) 97 4 °F (36 3 °C) (Oral)   01/18/23 98 °F (36 7 °C) (Temporal)        BP Readings from Last 3 Encounters:   23 115/60   23 110/70   23 145/70         Pulse Readings from Last 3 Encounters:   23 84   23 72   23 79        Physical Exam  Vitals reviewed  Constitutional:       General: He is not in acute distress  Appearance: He is well-developed  He is not diaphoretic  HENT:      Head: Normocephalic and atraumatic  Eyes:      Conjunctiva/sclera: Conjunctivae normal    Neck:      Trachea: No tracheal deviation  Cardiovascular:      Rate and Rhythm: Normal rate and regular rhythm  Heart sounds: No murmur heard  No friction rub  No gallop  Pulmonary:      Effort: Pulmonary effort is normal  No respiratory distress  Breath sounds: Normal breath sounds  No wheezing or rales  Chest:      Chest wall: No tenderness  Abdominal:      General: There is no distension  Palpations: Abdomen is soft  Tenderness: There is no abdominal tenderness  Musculoskeletal:      Cervical back: Normal range of motion and neck supple  Right lower leg: No edema  Left lower leg: No edema  Lymphadenopathy:      Cervical: No cervical adenopathy  Skin:     General: Skin is warm and dry  Coloration: Skin is not pale  Findings: No erythema  Neurological:      Mental Status: He is alert and oriented to person, place, and time  Psychiatric:         Behavior: Behavior normal          Thought Content: Thought content normal          Judgment: Judgment normal          ECO    Goals and Barriers:  Current Goal: Minimize effects of disease  Barriers: None  Patient's Capacity to Self Care:  Patient is able to self care      Code Status: [unfilled]

## 2023-02-01 ENCOUNTER — TELEPHONE (OUTPATIENT)
Dept: HEMATOLOGY ONCOLOGY | Facility: CLINIC | Age: 73
End: 2023-02-01

## 2023-02-01 NOTE — TELEPHONE ENCOUNTER
Appointment Confirmation (to confirm pre existing appointments - ONLY)  No need to route   Who is calling to confirm? Significant Other   If someone other than patient is calling, are they listed on the communication consent form?  Yes   Appointment with  Tonio Leonard   Appointment date & time  03/02/23 1PM   Appointment location [facility]   Patient verbilized understanding Yes

## 2023-02-02 ENCOUNTER — TELEPHONE (OUTPATIENT)
Dept: UROLOGY | Facility: CLINIC | Age: 73
End: 2023-02-02

## 2023-02-03 ENCOUNTER — PATIENT OUTREACH (OUTPATIENT)
Dept: CASE MANAGEMENT | Facility: OTHER | Age: 73
End: 2023-02-03

## 2023-02-03 NOTE — PROGRESS NOTES
OSW called Patricia Stover today for supportive outreach  LM on answering machine with return call information  Will follow

## 2023-02-06 NOTE — PROGRESS NOTES
UROLOGY FOLLOWUP NOTE     CHIEF COMPLAINT   Malaika Bueno is a 74 y o  male with a complaint of   Chief Complaint   Patient presents with   • Follow-up       History of Present Illness:   Malaika Bueno is a 74 y o  male here for evaluation of bladder cancer  I personally met the patient in May of 2022  Patient has a history of alcoholic cirrhosis and prior liver transplantation 20 years ago  Patient reports being seen by a prior urologist in 2019 for some bladder issues  CT scan in May of 2021 (prior to our meeting) did raise some question of bladder wall thickening  He did not see a urologist after this concern  Patient presented to the hospital in May with gross hematuria  CT showed question of progressive bladder lesions around the right side and distal ureter  Operative resection of the bladder was performed and demonstrated muscle invasive bladder cancer  The right ureter could not be visualized or stented and so the patient required nephrostomy tube placement  Given the patient's significant comorbidities, discussion at multidisciplinary tumor board was held  Patient was offered chemotherapy and radiation for MIBC  Unfortunately was unable to complete the course of chemotherapy given toxicity however radiation was completed  The patient has required nephrostomy tube changes however has had some dislodgement  In October of this year, his PCN was exchanged to a PCNU and the tube was capped  After cap trial, patient has not developed significant bleeding but has had flank pain and cloudy appearing urine  Based on our office cystoscopy in November, the patient I agree to return to the operating room  On January 18, 2023, the patient underwent retrograde pyelogram and placement of indwelling double-J stent with removal of his right nephrostomy tube, biopsy of the periureteral tissue    Because of the significant dilation required in order to access the patient's bladder, I left him with a urethral catheter in place  Patient had significant difficulty with this and was seen in the emergency room  Catheter has now been removed      Past Medical History:     Past Medical History:   Diagnosis Date   • Acute urinary retention 09/12/2022   • Alcoholism ([facility] [address] )    • Anemia    • Bladder cancer ([facility] [address] )    • Cerebral artery aneurysm    • Change in mental state     last assessed 5/18/15; resolved 10/27/15   • Chronic kidney disease    • COVID-19     2022 not hospitalized fully recovered   • Diabetes mellitus ([facility] [address] )    • Drug dependence ([facility] [address] )    • Fatigue     last assessed 1/26/15; resolved 5/24/16   • Hepatitis C    • Hospital discharge follow-up 12/21/2018   • Hx of liver transplant ([facility] [address] )    • Hydronephrosis of right kidney    • Kidney disease    • 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[facility] [address] )    • Liver cirrhosis ([facility] [address] )    • Liver transplant recipient [facility])    • Renal disorder    • Stroke (cerebrum) ([facility] [address] )    • Stroke ([facility] [address] )     diff short term memory   • Subdural hygroma     2/27/14; resolved 7/28/15   • Thrombocytopenia ([facility] [address] ) 09/20/2017       PAST SURGICAL HISTORY:     Past Surgical History:   Procedure Laterality Date   • BRAIN SURGERY  02/12/2014    left frontotemporal 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   • CATARACT EXTRACTION     • CHOLECYSTECTOMY     • CYSTOSCOPY  11/30/2022    [place]   • FL LUMBAR PUNCTURE DIAGNOSTIC  12/14/2018   • FL RETROGRADE PYELOGRAM  1/18/2023   • HEPATITIS B SURFACE AB QN,LIVER TRANSPLANT (HISTORICAL)     • IR NEPHROSTOMY TUBE CHECK/CHANGE/REPOSITION/REINSERTION/UPSIZE  07/29/2022   • IR NEPHROSTOMY TUBE CHECK/CHANGE/REPOSITION/REINSERTION/UPSIZE  08/31/2022   • IR NEPHROSTOMY TUBE CHECK/CHANGE/REPOSITION/REINSERTION/UPSIZE  10/07/2022   • IR NEPHROSTOMY TUBE CHECK/CHANGE/REPOSITION/REINSERTION/UPSIZE  12/3/2022   • IR NEPHROSTOMY TUBE PLACEMENT  05/28/2022   • IR PICC LINE  12/14/2018   • IR PORT PLACEMENT  06/23/2022   • LIVER TRANSPLANTATION • NEPHROSTOMY TUBE CHANGE N/A 1/18/2023    Procedure: Removal of  percutaneous nephroureteral catheter;  Surgeon: Laverne Aranda MD;  Location: [facility];  Service: Urology   • MD CYSTO BLADDER W/URETERAL CATHETERIZATION Right 1/18/2023    Procedure: URETHRAL DILATION, CYSTOGRAM, CYSTOSCOPY, RIGHT RETEROGRADE PYELOGRAM, [phone], COMPLEX CHICAS PLACEMENT;  Surgeon: Laverne Aranda MD;  Location: [facility];  Service: Urology   • ROTATOR CUFF REPAIR     • SHOULDER SURGERY     • TRANSURETHRAL RESECTION OF BLADDER TUMOR N/A 05/26/2022    Procedure: TRANSURETHRAL RESECTION OF BLADDER TUMOR (TURBT);   Surgeon: Laverne Aranda MD;  Location: [facility];  Service: Urology       CURRENT MEDICATIONS:     Current Outpatient Medications   Medication Sig Dispense Refill   • acetaminophen (TYLENOL) 325 mg tablet Take 3 tablets (975 mg total) by mouth every 8 (eight) hours  0   • Alcohol Swabs (B-D SINGLE USE SWABS REGULAR) PADS USE 2-3 TIMES DAILY AS NEEDED     • Blood Glucose Calibration (OT ULTRA/FASTTK CNTRL SOLN) SOLN USE AS DIRECTED 1 each 0   • clopidogrel (PLAVIX) 75 mg tablet Take 1 tablet (75 mg total) by mouth daily Do not start before January 23, 2023  90 tablet 0   • Comfort EZ Pen Needles 32G X 4 MM MISC USE 3-4 AS DIRECTED 100 each 5   • Continuous Blood Gluc  (FreeStyle Frank 14 Day [place]) NATALIA Use 1 Device continuous 1 each 0   • Continuous Blood Gluc Sensor (FreeStyle Frank 14 Day Sensor) MISC Use 1 Device 4 (four) times a day 1 each 5   • insulin detemir (Levemir FlexTouch) 100 Units/mL injection pen Inject 15 Units under the skin daily at bedtime 15 mL 3   • Lancet Devices (Lancing Device) MISC USE AS DIRECTED 1 each 0   • loperamide (IMODIUM) 2 mg capsule Take 1 capsule (2 mg total) by mouth 2 (two) times a day as needed for diarrhea [facility] por cynthia si tiene diarrea 90 capsule 0   • Nutritional Supplements (Glucerna Shake) LIQD Take 1 Bottle by mouth 3 (three) times a day 18577 mL 0   • OneTouch Ultra test strip TEST 3-4 TIMES A  each 4   • oxybutynin (DITROPAN) 5 mg tablet Take 1 tablet (5 mg total) by mouth 2 (two) times a day as needed (bladder spasms) 60 tablet 0   • pregabalin (LYRICA) 75 mg capsule Take 1 capsule (75 mg total) by mouth 2 (two) times a day for 10 days 60 capsule 5   • rosuvastatin (CRESTOR) 40 MG tablet TAKE ONE (1) TABLET BY MOUTH DAILY 30 tablet 5   • tacrolimus (PROGRAF) 1 mg capsule TAKE 1 CAPSULE (1 MG TOTAL) BY MOUTH EVERY 12 (TWELVE) HOURS 180 capsule 3   • tamsulosin (FLOMAX) 0 4 mg Take 1 capsule (0 4 mg total) by mouth daily with dinner 90 capsule 3   • temazepam (RESTORIL) 7 5 mg capsule TAKE 2 CAPSULES (15 MG TOTAL) BY MOUTH DAILY AT BEDTIME AS NEEDED FOR SLEEP 30 capsule 3   • zolpidem (AMBIEN) 5 mg tablet Take 5 mg by mouth daily at bedtime as needed     • HYDROcodone-acetaminophen (Norco) 5-325 mg per tablet Take 1 tablet by mouth every 6 (six) hours as needed for pain Max Daily Amount: 4 tablets (Patient not taking: Reported on 1/25/2023) 4 tablet 0   • Incontinence Supply Disposable (Incontinence Brief Medium) MISC Use 4 (four) times a day 120 each 0   • oxybutynin (DITROPAN) 5 mg tablet Take 1 tablet (5 mg total) by mouth 3 (three) times a day for 10 days 30 tablet 0   • sodium chloride  (Patient not taking: Reported on 2/7/2023)       Current Facility-Administered Medications   Medication Dose Route Frequency Provider Last Rate Last Admin   • lidocaine (URO-JET, GLYDO) 2 % urethral/mucosal gel 1 application  1 application Urethral Daily PRN Dilshad Officer, MD           ALLERGIES:     Allergies   Allergen Reactions   • Tequin [Gatifloxacin] Rash   • Lipitor [Atorvastatin] Hives and Other (See Comments)     Rash and itching   • Ciprofloxacin Rash   • Iohexol Hives and Other (See Comments)     Contraindication with rosuvastatin      • Iv Contrast [Iodinated Contrast Media] Rash and Other (See Comments)     Unknown   • Levofloxacin Rash       SOCIAL HISTORY:     Social History     Socioeconomic History   • Marital status: Single     Spouse name: None   • Number of children: None   • Years of education: less then high school   • Highest education level: None   Occupational History   • Occupation: physical disability    Tobacco Use   • Smoking status: Former     Packs/day: 1 00     Types: Cigarettes     Quit date:      Years since quittin    • Smokeless tobacco: Never   • Tobacco comments:     former smoker per allscripts    Vaping Use   • Vaping Use: Never used   Substance and Sexual Activity   • Alcohol use: Not Currently   • Drug use: No     Comment: remotely quit drug use per allscripts    • Sexual activity: Not Currently   Other Topics Concern   • None   Social History Narrative    ** Merged History Encounter **         ** Merged History Encounter **         Caffeine use   Living with significant other , girlfriend and daughter      Social Determinants of Health     Financial Resource Strain: Not on file   Food Insecurity: No Food Insecurity   • Worried About Running Out of Food in the Last Year: Never true   • Ran Out of Food in the Last Year: Never true   Transportation Needs: No Transportation Needs   • Lack of Transportation (Medical): No   • Lack of Transportation (Non-Medical):  No   Physical Activity: Not on file   Stress: Not on file   Social Connections: Not on file   Intimate Partner Violence: Not on file   Housing Stability: Low Risk    • Unable to Pay for Housing in the Last Year: No   • Number of Places Lived in the Last Year: 1   • Unstable Housing in the Last Year: No       SOCIAL HISTORY:     Family History   Problem Relation Age of Onset   • Hypertension Mother         benign essential    • Lung cancer Father    • Diabetes Sister    • Cancer Brother    • Stroke Other         cva - due to embolism of cerebra artery        REVIEW OF SYSTEMS:     Review of Systems   Constitutional: Positive for activity change and unexpected weight change  Respiratory: Negative  Cardiovascular: Negative  Gastrointestinal: Positive for abdominal pain  Genitourinary: Positive for difficulty urinating, dysuria and flank pain  Negative for hematuria  Musculoskeletal: Positive for back pain  Skin: Negative  Psychiatric/Behavioral: Negative  PHYSICAL EXAM:     /68 (BP Location: Right arm, Patient Position: Sitting, Cuff Size: Adult)   Pulse 66   Ht 5' 3" (1 6 m)   Wt 56 7 kg (125 lb)   BMI 22 14 kg/m²     Physical Exam  Constitutional:       Appearance: He is not ill-appearing (Improved appearance)  Comments: Thin and frail-appearing elderly male   HENT:      Head: Normocephalic  Nose: Nose normal       Mouth/Throat:      Mouth: Mucous membranes are moist    Eyes:      Pupils: Pupils are equal, round, and reactive to light  Cardiovascular:      Rate and Rhythm: Normal rate  Pulses: Normal pulses  Pulmonary:      Effort: Pulmonary effort is normal    Abdominal:      General: Abdomen is flat  Genitourinary:     Comments: Uncircumcised phallus, reducible foreskin, orthotopic meatus, testicles smooth and descended, right flank with capped nephrostomy tube, area inspected without fluctuance or drainage of purulent material  Musculoskeletal:         General: Normal range of motion  Cervical back: Normal range of motion  Skin:     General: Skin is warm  Neurological:      Mental Status: He is alert     Psychiatric:         Mood and Affect: Mood normal          LABS:     CBC:   Lab Results   Component Value Date    WBC 5 69 12/04/2022    HGB 9 1 (L) 12/04/2022    HCT 29 7 (L) 12/04/2022    MCV 78 (L) 12/04/2022     (L) 12/04/2022       BMP:   Lab Results   Component Value Date    GLUCOSE 179 (H) 08/05/2022    CALCIUM 7 7 (L) 01/18/2023     12/17/2015    K 4 5 01/18/2023    CO2 22 01/18/2023     (H) 01/18/2023    BUN 46 (H) 01/18/2023    CREATININE 2 48 (H) 01/18/2023         IMAGING:     10/14/22  CT CHEST, ABDOMEN AND PELVIS WITHOUT IV CONTRAST     INDICATION:   75-year-old male with history of bladder cancer (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) status post transurethral resection of bladder tumor on 5/26/2022 and cirrhosis status post liver transplant in   2003         COMPARISON:  CT chest, abdomen, pelvis 10/8/2022      TECHNIQUE: CT examination of the chest, abdomen and pelvis was performed without intravenous contrast  Axial, sagittal, and coronal 2D reformatted images were created from the source data and submitted for interpretation        Radiation dose length product (DLP) for this visit:  518 18 mGy-cm   This examination, like all CT scans performed in the [facility], was performed utilizing techniques to minimize radiation dose exposure, including the use of iterative   reconstruction and automated exposure control       Enteric contrast was not administered       FINDINGS:     CHEST     LUNGS:  No tracheal or endobronchial lesion  Subsegmental atelectasis in the lingula and the left lower lobe      A 9 mm right upper lobe solid pulmonary nodule (3/21), unchanged in size since 10/8/2022, but new since 5/23/2021      A 3 mm right upper lobe calcified granuloma (3/36)  A 4 mm right middle lobe triangular shaped juxtapleural pulmonary nodule (3/51), stable since 5/23/2021, likely representing an intrapulmonary lymph node      PLEURA:  Unremarkable      HEART/GREAT VESSELS: The heart is not enlarged  No pericardial effusion  Mild coronary artery calcifications  No thoracic aortic aneurysm  Mild atherosclerotic calcifications of the thoracic aorta      MEDIASTINUM AND JOHN: Stable mildly enlarged precarinal lymph node measuring 1 2 cm in short axis (2/21) since 5/23/2021      CHEST WALL AND LOWER NECK: Right chest wall vascular access port with tip in the right atrium    Mild bilateral gynecomastia      ABDOMEN     LIVER/BILIARY TREE:  Postsurgical changes of liver transplantation  No suspicious hepatic mass identified      GALLBLADDER:  Gallbladder is surgically absent      SPLEEN:  Unremarkable      PANCREAS:  Unremarkable      ADRENAL GLANDS:  Unremarkable      KIDNEYS/URETERS: Persistent mild right hydronephrosis with a right percutaneous nephroureteral stent in appropriate position, unchanged in position since prior study  Stable mural thickening of the mid to distal right ureter (2/86) with surrounding   periureteral stranding since CT 6/9/2022      STOMACH AND BOWEL:  Unremarkable      APPENDIX:  No findings to suggest appendicitis      ABDOMINOPELVIC CAVITY:  No ascites  No pneumoperitoneum  No lymphadenopathy      VESSELS:  Atherosclerotic changes are present  No evidence of aneurysm      PELVIS     REPRODUCTIVE ORGANS:  The prostate is nonenlarged  Intraprostatic calcifications      URINARY BLADDER:  Underdistended, limiting its evaluation      ABDOMINAL WALL/INGUINAL REGIONS:  Small bilateral fat-containing inguinal hernias      OSSEOUS STRUCTURES:  No acute fracture or destructive osseous lesion  Degenerative changes of the visualized spine  Unchanged inferior mild wedge compression deformity of the L2 vertebral body  Degenerative changes of the bilateral hips  Deformities   of the right ribs, consistent with healed right rib fractures      IMPRESSION:     1  A 9 mm right upper lobe solid pulmonary nodule, unchanged in size since 10/8/2022, but new since 5/23/2021, this finding is again suspicious for a pulmonary metastasis      2   Persistent mild right hydronephrosis with a right percutaneous nephroureteral stent in appropriate position      3   Stable mural thickening of the mid to distal right ureter with surrounding periureteral stranding since CT 6/9/2022  PATHOLOGY:     1/18/23  Final Diagnosis   A    Right trigone bladder lesion:     - Partially denuded urothelium and underlying fibromuscular stroma with chronic lymphoplasmacytic inflammation       and histologic changes compatible with prior biopsy      - No carcinoma identified [pankeratin (AE1/AE3) and CK20 immunostains]  5/26/22  Final Diagnosis   A  Urinary Bladder, Right posterior bladder tumor, Transurethral resection:  - Invasive high-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 (detrusor muscle)      B  Urinary Bladder, Right bladder tumor deep, Transurethral resection:  - Invasive high-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 (detrusor muscle)  Electronically        PROCEDURE:     Recent Results (from the past 2 hour(s))   POCT Measure PVR    Collection Time: 02/07/23 10:53 AM   Result Value Ref Range    POST-VOID RESIDUAL VOLUME, ML POC 20 mL        ASSESSMENT:     74 y o  male  with muscle invasive bladder cancer status post aborted chemotherapy and completed radiation, right ureteral obstruction from tumor now with JJ stent, evidence of distal urethral stricture    PLAN:     Patient is currently voiding well to completion  I have counseled the patient and his daughter to be mindful for signs of urinary stream worsening or retention given his stricture disease  We have plans to return to the operating room in April for retrograde pyelogram and stent exchange with metal impregnated stent  This will hopefully decrease the frequency of stent exchanges needed  I also discussed that if at the time, the retrograde pyelogram demonstrates easy passage and drainage of the contrast, we could consider leaving the stent out  Patient understands this will put him at risk for reconstruction and need for urgent stent replacement or nephrostomy tube  Decision will be made intraoperatively at the time  Patient will continue his care with the medical and radiation oncology team   Given some small lung nodules, the patient will be having an upcoming lung biopsy    I will plan to review the results  Continued surveillance of the bladder will be warranted

## 2023-02-07 ENCOUNTER — OFFICE VISIT (OUTPATIENT)
Dept: UROLOGY | Facility: CLINIC | Age: 73
End: 2023-02-07

## 2023-02-07 VITALS
WEIGHT: 125 LBS | BODY MASS INDEX: 22.15 KG/M2 | HEART RATE: 66 BPM | DIASTOLIC BLOOD PRESSURE: 68 MMHG | SYSTOLIC BLOOD PRESSURE: 118 MMHG | HEIGHT: 63 IN

## 2023-02-07 DIAGNOSIS — N35.913 STRICTURE OF MEMBRANOUS URETHRA IN MALE, UNSPECIFIED STRICTURE TYPE: Primary | ICD-10-CM

## 2023-02-07 LAB — POST-VOID RESIDUAL VOLUME, ML POC: 20 ML

## 2023-02-07 NOTE — LETTER
February 7, 2023     Dhiraj Bautista MD  [address] [address]    Patient: Jaylan Tyler   YOB: 1950   Date of Visit: 2/7/2023       Dear Dr Patrick Kennedy: Thank you for referring Jaylan Tyelr to me for evaluation  Below are my notes for this consultation  If you have questions, please do not hesitate to call me  I look forward to following your patient along with you  Sincerely,        Diane Butler MD        CC: MD Ovidio Mensah DO Brena Griffith, MD  2/7/2023 11:05 AM  Incomplete    UROLOGY FOLLOWUP NOTE     CHIEF COMPLAINT   Jaylan Tyler is a 74 y o  male with a complaint of   Chief Complaint   Patient presents with   • Follow-up       History of Present Illness:   Jaylan Tyler is a 74 y o  male here for evaluation of bladder cancer  I personally met the patient in May of 2022  Patient has a history of alcoholic cirrhosis and prior liver transplantation 20 years ago  Patient reports being seen by a prior urologist in 2019 for some bladder issues  CT scan in May of 2021 (prior to our meeting) did raise some question of bladder wall thickening  He did not see a urologist after this concern  Patient presented to the hospital in May with gross hematuria  CT showed question of progressive bladder lesions around the right side and distal ureter  Operative resection of the bladder was performed and demonstrated muscle invasive bladder cancer  The right ureter could not be visualized or stented and so the patient required nephrostomy tube placement  Given the patient's significant comorbidities, discussion at multidisciplinary tumor board was held  Patient was offered chemotherapy and radiation for MIBC  Unfortunately was unable to complete the course of chemotherapy given toxicity however radiation was completed  The patient has required nephrostomy tube changes however has had some dislodgement    In October of this year, his PCN was exchanged to a PCNU and the tube was capped  After cap trial, patient has not developed significant bleeding but has had flank pain and cloudy appearing urine  Based on our office cystoscopy in November, the patient I agree to return to the operating room  On January 18, 2023, the patient underwent retrograde pyelogram and placement of indwelling double-J stent with removal of his right nephrostomy tube, biopsy of the periureteral tissue  Because of the significant dilation required in order to access the patient's bladder, I left him with a urethral catheter in place  Patient had significant difficulty with this and was seen in the emergency room  Catheter has now been removed      Past Medical History:     Past Medical History:   Diagnosis Date   • Acute urinary retention 09/12/2022   • Alcoholism ([address] )    • Anemia    • Bladder cancer ([address] )    • Cerebral artery aneurysm    • Change in mental state     last assessed 5/18/15; resolved 10/27/15   • Chronic kidney disease    • COVID-19 2022 not hospitalized fully recovered   • Diabetes mellitus ([facility] )    • Drug dependence ([facility] )    • Fatigue     last assessed 1/26/15; resolved 5/24/16   • Hepatitis C    • Hospital discharge follow-up 12/21/2018   • Hx of liver transplant [facility])    • Hydronephrosis of right kidney    • Kidney disease    • 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[facility] )    • Liver cirrhosis ([facility] )    • Liver transplant recipient [facility])    • Renal disorder    • Stroke (cerebrum) ([facility] )    • Stroke ([address] )     diff short term memory   • Subdural hygroma     2/27/14; resolved 7/28/15   • Thrombocytopenia ([address] ) 09/20/2017       PAST SURGICAL HISTORY:     Past Surgical History:   Procedure Laterality Date   • BRAIN SURGERY  02/12/2014    left frontotemporal 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   • CATARACT EXTRACTION     • CHOLECYSTECTOMY     • CYSTOSCOPY  11/30/2022    [place]   • FL LUMBAR PUNCTURE DIAGNOSTIC  12/14/2018   • FL RETROGRADE PYELOGRAM  1/18/2023   • HEPATITIS B SURFACE AB QN,LIVER TRANSPLANT (HISTORICAL)     • IR NEPHROSTOMY TUBE CHECK/CHANGE/REPOSITION/REINSERTION/UPSIZE  07/29/2022   • IR NEPHROSTOMY TUBE CHECK/CHANGE/REPOSITION/REINSERTION/UPSIZE  08/31/2022   • IR NEPHROSTOMY TUBE CHECK/CHANGE/REPOSITION/REINSERTION/UPSIZE  10/07/2022   • IR NEPHROSTOMY TUBE CHECK/CHANGE/REPOSITION/REINSERTION/UPSIZE  12/3/2022   • IR NEPHROSTOMY TUBE PLACEMENT  05/28/2022   • IR PICC LINE  12/14/2018   • IR PORT PLACEMENT  06/23/2022   • LIVER TRANSPLANTATION     • NEPHROSTOMY TUBE CHANGE N/A 1/18/2023    Procedure: Removal of  percutaneous nephroureteral catheter;  Surgeon: Shaw Urrutia MD;  [facility];  Service: Urology   • [place] CYSTO BLADDER W/URETERAL CATHETERIZATION Right 1/18/2023    Procedure: URETHRAL DILATION, CYSTOGRAM, CYSTOSCOPY, RIGHT RETEROGRADE PYELOGRAM, [address], COMPLEX CHICAS PLACEMENT;  Surgeon: Shaw Urrutia MD;  [facility];  Service: Urology   • ROTATOR CUFF REPAIR     • SHOULDER SURGERY     • TRANSURETHRAL RESECTION OF BLADDER TUMOR N/A 05/26/2022    Procedure: TRANSURETHRAL RESECTION OF BLADDER TUMOR (TURBT);   Surgeon: Shaw Urrutia MD;  [facility];  Service: Urology       CURRENT MEDICATIONS:     Current Outpatient Medications   Medication Sig Dispense Refill   • acetaminophen (TYLENOL) 325 mg tablet Take 3 tablets (975 mg total) by mouth every 8 (eight) hours  0   • Alcohol Swabs (B-D SINGLE USE SWABS REGULAR) PADS USE 2-3 TIMES DAILY AS NEEDED     • Blood Glucose Calibration (OT ULTRA/FASTTK CNTRL SOLN) SOLN USE AS DIRECTED 1 each 0   • clopidogrel (PLAVIX) 75 mg tablet Take 1 tablet (75 mg total) by mouth daily Do not start before January 23, 2023  90 tablet 0   • Comfort EZ Pen Needles 32G X 4 MM MISC USE 3-4 AS DIRECTED 100 each 5   • Continuous Blood Gluc  (FreeStyle Frank 14 Day [place]) NATALIA Use 1 Device continuous 1 each 0   • Continuous Blood Gluc Sensor (FreeStyle Frank 14 Day Sensor) MISC Use 1 Device 4 (four) times a day 1 each 5   • insulin detemir (Levemir FlexTouch) 100 Units/mL injection pen Inject 15 Units under the skin daily at bedtime 15 mL 3   • Lancet Devices (Lancing Device) MISC USE AS DIRECTED 1 each 0   • loperamide (IMODIUM) 2 mg capsule Take 1 capsule (2 mg total) by mouth 2 (two) times a day as needed for diarrhea [place] carmen tableta dos veces por cynthia si tiene diarrea 90 capsule 0   • Nutritional Supplements (Glucerna Shake) LIQD Take 1 Bottle by mouth 3 (three) times a day 63127 mL 0   • OneTouch Ultra test strip TEST 3-4 TIMES A  each 4   • oxybutynin (DITROPAN) 5 mg tablet Take 1 tablet (5 mg total) by mouth 2 (two) times a day as needed (bladder spasms) 60 tablet 0   • pregabalin (LYRICA) 75 mg capsule Take 1 capsule (75 mg total) by mouth 2 (two) times a day for 10 days 60 capsule 5   • rosuvastatin (CRESTOR) 40 MG tablet TAKE ONE (1) TABLET BY MOUTH DAILY 30 tablet 5   • tacrolimus (PROGRAF) 1 mg capsule TAKE 1 CAPSULE (1 MG TOTAL) BY MOUTH EVERY 12 (TWELVE) HOURS 180 capsule 3   • tamsulosin (FLOMAX) 0 4 mg Take 1 capsule (0 4 mg total) by mouth daily with dinner 90 capsule 3   • temazepam (RESTORIL) 7 5 mg capsule TAKE 2 CAPSULES (15 MG TOTAL) BY MOUTH DAILY AT BEDTIME AS NEEDED FOR SLEEP 30 capsule 3   • zolpidem (AMBIEN) 5 mg tablet Take 5 mg by mouth daily at bedtime as needed     • HYDROcodone-acetaminophen (Norco) 5-325 mg per tablet Take 1 tablet by mouth every 6 (six) hours as needed for pain Max Daily Amount: 4 tablets (Patient not taking: Reported on 1/25/2023) 4 tablet 0   • Incontinence Supply Disposable (Incontinence Brief Medium) MISC Use 4 (four) times a day 120 each 0   • oxybutynin (DITROPAN) 5 mg tablet Take 1 tablet (5 mg total) by mouth 3 (three) times a day for 10 days 30 tablet 0   • sodium chloride  (Patient not taking: Reported on 2/7/2023)       Current Facility-Administered Medications   Medication Dose Route Frequency Provider Last Rate Last Admin   • lidocaine (URO-JET, GLYDO) 2 % urethral/mucosal gel 1 application  1 application Urethral Daily PRN Taiwo Archer MD           ALLERGIES:     Allergies   Allergen Reactions   • Tequin [Gatifloxacin] Rash   • Lipitor [Atorvastatin] Hives and Other (See Comments)     Rash and itching   • Ciprofloxacin Rash   • Iohexol Hives and Other (See Comments)     Contraindication with rosuvastatin  • Iv Contrast [Iodinated Contrast Media] Rash and Other (See Comments)     Unknown   • Levofloxacin Rash       SOCIAL HISTORY:     Social History     Socioeconomic History   • Marital status: Single     Spouse name: None   • Number of children: None   • Years of education: less then high school   • Highest education level: None   Occupational History   • Occupation: physical disability    Tobacco Use   • Smoking status: Former     Packs/day:      Types: Cigarettes     Quit date:      Years since quittin 1   • Smokeless tobacco: Never   • Tobacco comments:     former smoker per allscripts    Vaping Use   • Vaping Use: Never used   Substance and Sexual Activity   • Alcohol use: Not Currently   • Drug use: No     Comment: remotely quit drug use per allscripts    • Sexual activity: Not Currently   Other Topics Concern   • None   Social History Narrative    ** Merged History Encounter **         ** Merged History Encounter **         Caffeine use   Living with significant other , girlfriend and daughter      Social Determinants of Health     Financial Resource Strain: Not on file   Food Insecurity: No Food Insecurity   • Worried About Running Out of Food in the Last Year: Never true   • Ran Out of Food in the Last Year: Never true   Transportation Needs: No Transportation Needs   • Lack of Transportation (Medical): No   • Lack of Transportation (Non-Medical):  No   Physical Activity: Not on file   Stress: Not on file Social Connections: Not on file   Intimate Partner Violence: Not on file   Housing Stability: Low Risk    • Unable to Pay for Housing in the Last Year: No   • Number of Places Lived in the Last Year: 1   • Unstable Housing in the Last Year: No       SOCIAL HISTORY:     Family History   Problem Relation Age of Onset   • Hypertension Mother         benign essential    • Lung cancer Father    • Diabetes Sister    • Cancer Brother    • Stroke Other         cva - due to embolism of cerebra artery        REVIEW OF SYSTEMS:     Review of Systems   Constitutional: Positive for activity change and unexpected weight change  Respiratory: Negative  Cardiovascular: Negative  Gastrointestinal: Positive for abdominal pain  Genitourinary: Positive for difficulty urinating, dysuria and flank pain  Negative for hematuria  Musculoskeletal: Positive for back pain  Skin: Negative  Psychiatric/Behavioral: Negative  PHYSICAL EXAM:     /68 (BP Location: Right arm, Patient Position: Sitting, Cuff Size: Adult)   Pulse 66   Ht 5' 3" (1 6 m)   Wt 56 7 kg (125 lb)   BMI 22 14 kg/m²     Physical Exam  Constitutional:       Appearance: He is not ill-appearing (Improved appearance)  Comments: Thin and frail-appearing elderly male   HENT:      Head: Normocephalic  Nose: Nose normal       Mouth/Throat:      Mouth: Mucous membranes are moist    Eyes:      Pupils: Pupils are equal, round, and reactive to light  Cardiovascular:      Rate and Rhythm: Normal rate  Pulses: Normal pulses  Pulmonary:      Effort: Pulmonary effort is normal    Abdominal:      General: Abdomen is flat  Genitourinary:     Comments: Uncircumcised phallus, reducible foreskin, orthotopic meatus, testicles smooth and descended, right flank with capped nephrostomy tube, area inspected without fluctuance or drainage of purulent material  Musculoskeletal:         General: Normal range of motion        Cervical back: Normal range of motion  Skin:     General: Skin is warm  Neurological:      Mental Status: He is alert  Psychiatric:         Mood and Affect: Mood normal          LABS:     CBC:   Lab Results   Component Value Date    WBC 5 69 12/04/2022    HGB 9 1 (L) 12/04/2022    HCT 29 7 (L) 12/04/2022    MCV 78 (L) 12/04/2022     (L) 12/04/2022       BMP:   Lab Results   Component Value Date    GLUCOSE 179 (H) 08/05/2022    CALCIUM 7 7 (L) 01/18/2023     12/17/2015    K 4 5 01/18/2023    CO2 22 01/18/2023     (H) 01/18/2023    BUN 46 (H) 01/18/2023    CREATININE 2 48 (H) 01/18/2023         IMAGING:     10/14/22  CT CHEST, ABDOMEN AND PELVIS WITHOUT IV CONTRAST     INDICATION:   49-year-old male with history of bladder cancer (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) status post transurethral resection of bladder tumor on 5/26/2022 and cirrhosis status post liver transplant in   2003         COMPARISON:  CT chest, abdomen, pelvis 10/8/2022      TECHNIQUE: CT examination of the chest, abdomen and pelvis was performed without intravenous contrast  Axial, sagittal, and coronal 2D reformatted images were created from the source data and submitted for interpretation        Radiation dose length product (DLP) for this visit:  518 18 mGy-cm   This examination, like all CT scans performed in the [facility], was performed utilizing techniques to minimize radiation dose exposure, including the use of iterative   reconstruction and automated exposure control       Enteric contrast was not administered       FINDINGS:     CHEST     LUNGS:  No tracheal or endobronchial lesion  Subsegmental atelectasis in the lingula and the left lower lobe      A 9 mm right upper lobe solid pulmonary nodule (3/21), unchanged in size since 10/8/2022, but new since 5/23/2021      A 3 mm right upper lobe calcified granuloma (3/36)    A 4 mm right middle lobe triangular shaped juxtapleural pulmonary nodule (3/51), stable since 5/23/2021, likely representing an intrapulmonary lymph node      PLEURA:  Unremarkable      HEART/GREAT VESSELS: The heart is not enlarged  No pericardial effusion  Mild coronary artery calcifications  No thoracic aortic aneurysm  Mild atherosclerotic calcifications of the thoracic aorta      MEDIASTINUM AND JOHN: Stable mildly enlarged precarinal lymph node measuring 1 2 cm in short axis (2/21) since 5/23/2021      CHEST WALL AND LOWER NECK: Right chest wall vascular access port with tip in the right atrium  Mild bilateral gynecomastia      ABDOMEN     LIVER/BILIARY TREE:  Postsurgical changes of liver transplantation  No suspicious hepatic mass identified      GALLBLADDER:  Gallbladder is surgically absent      SPLEEN:  Unremarkable      PANCREAS:  Unremarkable      ADRENAL GLANDS:  Unremarkable      KIDNEYS/URETERS: Persistent mild right hydronephrosis with a right percutaneous nephroureteral stent in appropriate position, unchanged in position since prior study  Stable mural thickening of the mid to distal right ureter (2/86) with surrounding   periureteral stranding since CT 6/9/2022      STOMACH AND BOWEL:  Unremarkable      APPENDIX:  No findings to suggest appendicitis      ABDOMINOPELVIC CAVITY:  No ascites  No pneumoperitoneum  No lymphadenopathy      VESSELS:  Atherosclerotic changes are present  No evidence of aneurysm      PELVIS     REPRODUCTIVE ORGANS:  The prostate is nonenlarged  Intraprostatic calcifications      URINARY BLADDER:  Underdistended, limiting its evaluation      ABDOMINAL WALL/INGUINAL REGIONS:  Small bilateral fat-containing inguinal hernias      OSSEOUS STRUCTURES:  No acute fracture or destructive osseous lesion  Degenerative changes of the visualized spine  Unchanged inferior mild wedge compression deformity of the L2 vertebral body  Degenerative changes of the bilateral hips    Deformities   of the right ribs, consistent with healed right rib fractures      IMPRESSION:     1  A 9 mm right upper lobe solid pulmonary nodule, unchanged in size since 10/8/2022, but new since 5/23/2021, this finding is again suspicious for a pulmonary metastasis      2   Persistent mild right hydronephrosis with a right percutaneous nephroureteral stent in appropriate position      3   Stable mural thickening of the mid to distal right ureter with surrounding periureteral stranding since CT 6/9/2022  PATHOLOGY:     1/18/23  Final Diagnosis   A  Right trigone bladder lesion:     - Partially denuded urothelium and underlying fibromuscular stroma with chronic lymphoplasmacytic inflammation       and histologic changes compatible with prior biopsy      - No carcinoma identified [pankeratin (AE1/AE3) and CK20 immunostains]  5/26/22  Final Diagnosis   A  Urinary Bladder, Right posterior bladder tumor, Transurethral resection:  - Invasive high-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 (detrusor muscle)      B  Urinary Bladder, Right bladder tumor deep, Transurethral resection:  - Invasive high-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 (detrusor muscle)  Electronically        PROCEDURE:     Recent Results (from the past 2 hour(s))   POCT Measure PVR    Collection Time: 02/07/23 10:53 AM   Result Value Ref Range    POST-VOID RESIDUAL VOLUME, ML POC 20 mL        ASSESSMENT:     74 y o  male  with muscle invasive bladder cancer status post aborted chemotherapy and completed radiation, right ureteral obstruction from tumor now with JJ stent, evidence of distal urethral stricture    PLAN:     Patient is currently voiding well to completion  I have counseled the patient and his daughter to be mindful for signs of urinary stream worsening or retention given his stricture disease  We have plans to return to the operating room in April for retrograde pyelogram and stent exchange with metal impregnated stent    This will hopefully decrease the frequency of stent exchanges needed  I also discussed that if at the time, the retrograde pyelogram demonstrates easy passage and drainage of the contrast, we could consider leaving the stent out  Patient understands this will put him at risk for reconstruction and need for urgent stent replacement or nephrostomy tube  Decision will be made intraoperatively at the time  Patient will continue his care with the medical and radiation oncology team   Given some small lung nodules, the patient will be having an upcoming lung biopsy  I will plan to review the results  Continued surveillance of the bladder will be warranted  London Handy MD  2/6/2023  4:27 PM  Sign when Signing Visit    [address] Hannah Standard is a 74 y o  male with a complaint of   No chief complaint on file  History of Present Illness:   Eather Goodell is a 74 y o  male here for evaluation of bladder cancer  I personally met the patient in May of 2022  Patient has a history of alcoholic cirrhosis and prior liver transplantation 20 years ago  Patient reports being seen by a prior urologist in 2019 for some bladder issues  CT scan in May of 2021 did raise some question of bladder wall thickening  He did not see a urologist after this concern  Patient presented to the hospital in May with gross hematuria  CT scan raise some concern about bladder lesions around the right side and distal ureter  Resection of the bladder was performed and demonstrated muscle invasive bladder cancer  The right ureter could not be visualized or stented and so the patient required nephrostomy tube placement  Given the patient's significant comorbidities, discussion at multidisciplinary tumor board was held  Patient was offered chemotherapy and radiation  Unfortunately was unable to complete the course of chemotherapy given toxicity however radiation was completed    The patient has required nephrostomy tube changes however has had some dislodgement  In October of this year, his PCN was exchanged to a PCNU and the tube was capped  After cap trial, patient has not developed significant bleeding but has had flank pain and cloudy appearing urine  Based on our office cystoscopy in November, the patient I agree to return to the operating room  On January 18, 2023, the patient underwent retrograde pyelogram and placement of indwelling double-J stent with removal of his right nephrostomy tube, biopsy of the periureteral tissue  Because of the significant dilation required in order to access the patient's bladder, I left him with a urethral catheter in place  Patient had significant difficulty with this and was seen in the emergency room  Catheter has now been removed        Past Medical History:     Past Medical History:   Diagnosis Date   • Acute urinary retention 09/12/2022   • Alcoholism ([facility] )    • Anemia    • Bladder cancer ([facility] )    • Cerebral artery aneurysm    • Change in mental state     last assessed 5/18/15; resolved 10/27/15   • Chronic kidney disease    • COVID-19 2022 not hospitalized fully recovered   • Diabetes mellitus ([facility] )    • Drug dependence ([facility] )    • Fatigue     last assessed 1/26/15; resolved 5/24/16   • Hepatitis C    • Hospital discharge follow-up 12/21/2018   • Hx of liver transplant [facility])    • Hydronephrosis of right kidney    • Kidney disease    • 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[facility] )    • Liver cirrhosis ([facility] )    • Liver transplant recipient [facility])    • Renal disorder    • Stroke (cerebrum) ([facility] )    • Stroke ([address] )     diff short term memory   • Subdural hygroma     2/27/14; resolved 7/28/15   • Thrombocytopenia ([address] ) 09/20/2017       PAST SURGICAL HISTORY:     Past Surgical History:   Procedure Laterality Date   • BRAIN SURGERY  02/12/2014    left frontotemporal 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   • CATARACT EXTRACTION     • CHOLECYSTECTOMY     • CYSTOSCOPY  11/30/2022    [place]   • FL LUMBAR PUNCTURE DIAGNOSTIC  12/14/2018   • FL RETROGRADE PYELOGRAM  1/18/2023   • HEPATITIS B SURFACE AB QN,LIVER TRANSPLANT (HISTORICAL)     • IR NEPHROSTOMY TUBE CHECK/CHANGE/REPOSITION/REINSERTION/UPSIZE  07/29/2022   • IR NEPHROSTOMY TUBE CHECK/CHANGE/REPOSITION/REINSERTION/UPSIZE  08/31/2022   • IR NEPHROSTOMY TUBE CHECK/CHANGE/REPOSITION/REINSERTION/UPSIZE  10/07/2022   • IR NEPHROSTOMY TUBE CHECK/CHANGE/REPOSITION/REINSERTION/UPSIZE  12/3/2022   • IR NEPHROSTOMY TUBE PLACEMENT  05/28/2022   • IR PICC LINE  12/14/2018   • IR PORT PLACEMENT  06/23/2022   • LIVER TRANSPLANTATION     • NEPHROSTOMY TUBE CHANGE N/A 1/18/2023    Procedure: Removal of  percutaneous nephroureteral catheter;  Surgeon: Juan Francisco Donis MD;  [facility];  Service: Urology   • [place] CYSTO BLADDER W/URETERAL CATHETERIZATION Right 1/18/2023    Procedure: URETHRAL DILATION, CYSTOGRAM, CYSTOSCOPY, RIGHT RETEROGRADE PYELOGRAM, [address], COMPLEX CHICAS PLACEMENT;  Surgeon: Juan Francisco Donis MD;  [facility];  Service: Urology   • ROTATOR CUFF REPAIR     • SHOULDER SURGERY     • TRANSURETHRAL RESECTION OF BLADDER TUMOR N/A 05/26/2022    Procedure: TRANSURETHRAL RESECTION OF BLADDER TUMOR (TURBT);   Surgeon: Juan Francisco Donis MD;  [facility];  Service: Urology       CURRENT MEDICATIONS:     Current Outpatient Medications   Medication Sig Dispense Refill   • acetaminophen (TYLENOL) 325 mg tablet Take 3 tablets (975 mg total) by mouth every 8 (eight) hours (Patient not taking: Reported on 1/25/2023)  0   • Alcohol Swabs (B-D SINGLE USE SWABS REGULAR) PADS USE 2-3 TIMES DAILY AS NEEDED     • Blood Glucose Calibration (OT ULTRA/FASTTK CNTRL SOLN) SOLN USE AS DIRECTED 1 each 0   • clopidogrel (PLAVIX) 75 mg tablet Take 1 tablet (75 mg total) by mouth daily Do not start before January 23, 2023  90 tablet 0   • Comfort EZ Pen Needles 32G X 4 MM MISC USE 3-4 AS DIRECTED 100 each 5   • Continuous Blood Gluc  (FreeStyle Frank 14 Day [place]) NATALIA Use 1 Device continuous (Patient not taking: Reported on 12/6/2022) 1 each 0   • Continuous Blood Gluc Sensor (FreeStyle Frank 14 Day Sensor) MISC Use 1 Device 4 (four) times a day 1 each 5   • HYDROcodone-acetaminophen (Norco) 5-325 mg per tablet Take 1 tablet by mouth every 6 (six) hours as needed for pain Max Daily Amount: 4 tablets (Patient not taking: Reported on 1/25/2023) 4 tablet 0   • Incontinence Supply Disposable (Incontinence Brief Medium) MISC Use 4 (four) times a day 120 each 0   • insulin detemir (Levemir FlexTouch) 100 Units/mL injection pen Inject 15 Units under the skin daily at bedtime 15 mL 3   • Lancet Devices (Lancing Device) MISC USE AS DIRECTED 1 each 0   • loperamide (IMODIUM) 2 mg capsule Take 1 capsule (2 mg total) by mouth 2 (two) times a day as needed for diarrhea [facility] por cynthia si tiene diarrea (Patient not taking: Reported on 1/25/2023) 90 capsule 0   • Nutritional Supplements (Glucerna Shake) LIQD Take 1 Bottle by mouth 3 (three) times a day 23439 mL 0   • OneTouch Ultra test strip TEST 3-4 TIMES A  each 4   • oxybutynin (DITROPAN) 5 mg tablet Take 1 tablet (5 mg total) by mouth 2 (two) times a day as needed (bladder spasms) 60 tablet 0   • oxybutynin (DITROPAN) 5 mg tablet Take 1 tablet (5 mg total) by mouth 3 (three) times a day for 10 days 30 tablet 0   • pregabalin (LYRICA) 75 mg capsule Take 1 capsule (75 mg total) by mouth 2 (two) times a day for 10 days 60 capsule 5   • rosuvastatin (CRESTOR) 40 MG tablet TAKE ONE (1) TABLET BY MOUTH DAILY 30 tablet 5   • sodium chloride      • tacrolimus (PROGRAF) 1 mg capsule TAKE 1 CAPSULE (1 MG TOTAL) BY MOUTH EVERY 12 (TWELVE) HOURS 180 capsule 3   • tamsulosin (FLOMAX) 0 4 mg Take 1 capsule (0 4 mg total) by mouth daily with dinner 90 capsule 3   • temazepam (RESTORIL) 7 5 mg capsule TAKE 2 CAPSULES (15 MG TOTAL) BY MOUTH DAILY AT BEDTIME AS NEEDED FOR SLEEP 30 capsule 3   • zolpidem (AMBIEN) 5 mg tablet Take 5 mg by mouth daily at bedtime as needed       Current Facility-Administered Medications   Medication Dose Route Frequency Provider Last Rate Last Admin   • lidocaine (URO-JET, GLYDO) 2 % urethral/mucosal gel 1 application  1 application Urethral Daily PRN Arcadio Crain MD           ALLERGIES:     Allergies   Allergen Reactions   • Tequin [Gatifloxacin] Rash   • Lipitor [Atorvastatin] Hives and Other (See Comments)     Rash and itching   • Ciprofloxacin Rash   • Iohexol Hives and Other (See Comments)     Contraindication with rosuvastatin      • Iv Contrast [Iodinated Contrast Media] Rash and Other (See Comments)     Unknown   • Levofloxacin Rash       SOCIAL HISTORY:     Social History     Socioeconomic History   • Marital status: Single     Spouse name: Not on file   • Number of children: Not on file   • Years of education: less then high school   • Highest education level: Not on file   Occupational History   • Occupation: physical disability    Tobacco Use   • Smoking status: Former     Packs/day: 1 00     Types: Cigarettes     Quit date:      Years since quittin 1   • Smokeless tobacco: Never   • Tobacco comments:     former smoker per allscripts    Vaping Use   • Vaping Use: Never used   Substance and Sexual Activity   • Alcohol use: Not Currently   • Drug use: No     Comment: remotely quit drug use per allscripts    • Sexual activity: Not Currently   Other Topics Concern   • Not on file   Social History Narrative    ** Merged History Encounter **         ** Merged History Encounter **         Caffeine use   Living with significant other , girlfriend and daughter      Social Determinants of Health     Financial Resource Strain: Not on file   Food Insecurity: No Food Insecurity   • Worried About Running Out of Food in the Last Year: Never true   • Ran Out of Food in the Last Year: Never true Transportation Needs: No Transportation Needs   • Lack of Transportation (Medical): No   • Lack of Transportation (Non-Medical): No   Physical Activity: Not on file   Stress: Not on file   Social Connections: Not on file   Intimate Partner Violence: Not on file   Housing Stability: Low Risk    • Unable to Pay for Housing in the Last Year: No   • Number of Places Lived in the Last Year: 1   • Unstable Housing in the Last Year: No       SOCIAL HISTORY:     Family History   Problem Relation Age of Onset   • Hypertension Mother         benign essential    • Lung cancer Father    • Diabetes Sister    • Cancer Brother    • Stroke Other         cva - due to embolism of cerebra artery        REVIEW OF SYSTEMS:     Review of Systems   Constitutional: Positive for activity change and unexpected weight change  Respiratory: Negative  Cardiovascular: Negative  Gastrointestinal: Positive for abdominal pain  Genitourinary: Positive for difficulty urinating, dysuria and flank pain  Negative for hematuria  Musculoskeletal: Positive for back pain  Skin: Negative  Psychiatric/Behavioral: Negative  PHYSICAL EXAM:     There were no vitals taken for this visit  Physical Exam  Constitutional:       Appearance: He is ill-appearing  Comments: Thin and frail-appearing elderly male   HENT:      Head: Normocephalic  Nose: Nose normal       Mouth/Throat:      Mouth: Mucous membranes are moist    Eyes:      Pupils: Pupils are equal, round, and reactive to light  Cardiovascular:      Rate and Rhythm: Normal rate  Pulses: Normal pulses  Pulmonary:      Effort: Pulmonary effort is normal    Abdominal:      General: Abdomen is flat     Genitourinary:     Comments: Uncircumcised phallus, reducible foreskin, orthotopic meatus, testicles smooth and descended, right flank with capped nephrostomy tube, area inspected without fluctuance or drainage of purulent material  Musculoskeletal:         General: Normal range of motion  Cervical back: Normal range of motion  Skin:     General: Skin is warm  Neurological:      Mental Status: He is alert  Psychiatric:         Mood and Affect: Mood normal          LABS:     CBC:   Lab Results   Component Value Date    WBC 5 69 12/04/2022    HGB 9 1 (L) 12/04/2022    HCT 29 7 (L) 12/04/2022    MCV 78 (L) 12/04/2022     (L) 12/04/2022       BMP:   Lab Results   Component Value Date    GLUCOSE 179 (H) 08/05/2022    CALCIUM 7 7 (L) 01/18/2023     12/17/2015    K 4 5 01/18/2023    CO2 22 01/18/2023     (H) 01/18/2023    BUN 46 (H) 01/18/2023    CREATININE 2 48 (H) 01/18/2023         IMAGING:     10/14/22  CT CHEST, ABDOMEN AND PELVIS WITHOUT IV CONTRAST     INDICATION:   64-year-old male with history of bladder cancer (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) status post transurethral resection of bladder tumor on 5/26/2022 and cirrhosis status post liver transplant in   2003         COMPARISON:  CT chest, abdomen, pelvis 10/8/2022      TECHNIQUE: CT examination of the chest, abdomen and pelvis was performed without intravenous contrast  Axial, sagittal, and coronal 2D reformatted images were created from the source data and submitted for interpretation        Radiation dose length product (DLP) for this visit:  518 18 mGy-cm   This examination, like all CT scans performed in the [facility], was performed utilizing techniques to minimize radiation dose exposure, including the use of iterative   reconstruction and automated exposure control       Enteric contrast was not administered       FINDINGS:     CHEST     LUNGS:  No tracheal or endobronchial lesion  Subsegmental atelectasis in the lingula and the left lower lobe      A 9 mm right upper lobe solid pulmonary nodule (3/21), unchanged in size since 10/8/2022, but new since 5/23/2021      A 3 mm right upper lobe calcified granuloma (3/36)    A 4 mm right middle lobe triangular shaped juxtapleural pulmonary nodule (3/51), stable since 5/23/2021, likely representing an intrapulmonary lymph node      PLEURA:  Unremarkable      HEART/GREAT VESSELS: The heart is not enlarged  No pericardial effusion  Mild coronary artery calcifications  No thoracic aortic aneurysm  Mild atherosclerotic calcifications of the thoracic aorta      MEDIASTINUM AND JOHN: Stable mildly enlarged precarinal lymph node measuring 1 2 cm in short axis (2/21) since 5/23/2021      CHEST WALL AND LOWER NECK: Right chest wall vascular access port with tip in the right atrium  Mild bilateral gynecomastia      ABDOMEN     LIVER/BILIARY TREE:  Postsurgical changes of liver transplantation  No suspicious hepatic mass identified      GALLBLADDER:  Gallbladder is surgically absent      SPLEEN:  Unremarkable      PANCREAS:  Unremarkable      ADRENAL GLANDS:  Unremarkable      KIDNEYS/URETERS: Persistent mild right hydronephrosis with a right percutaneous nephroureteral stent in appropriate position, unchanged in position since prior study  Stable mural thickening of the mid to distal right ureter (2/86) with surrounding   periureteral stranding since CT 6/9/2022      STOMACH AND BOWEL:  Unremarkable      APPENDIX:  No findings to suggest appendicitis      ABDOMINOPELVIC CAVITY:  No ascites  No pneumoperitoneum  No lymphadenopathy      VESSELS:  Atherosclerotic changes are present  No evidence of aneurysm      PELVIS     REPRODUCTIVE ORGANS:  The prostate is nonenlarged  Intraprostatic calcifications      URINARY BLADDER:  Underdistended, limiting its evaluation      ABDOMINAL WALL/INGUINAL REGIONS:  Small bilateral fat-containing inguinal hernias      OSSEOUS STRUCTURES:  No acute fracture or destructive osseous lesion  Degenerative changes of the visualized spine  Unchanged inferior mild wedge compression deformity of the L2 vertebral body  Degenerative changes of the bilateral hips  Deformities   of the right ribs, consistent with healed right rib fractures      IMPRESSION:     1  A 9 mm right upper lobe solid pulmonary nodule, unchanged in size since 10/8/2022, but new since 5/23/2021, this finding is again suspicious for a pulmonary metastasis      2   Persistent mild right hydronephrosis with a right percutaneous nephroureteral stent in appropriate position      3   Stable mural thickening of the mid to distal right ureter with surrounding periureteral stranding since CT 6/9/2022  PATHOLOGY:     1/18/23  Final Diagnosis   A  Right trigone bladder lesion:     - Partially denuded urothelium and underlying fibromuscular stroma with chronic lymphoplasmacytic inflammation       and histologic changes compatible with prior biopsy      - No carcinoma identified [pankeratin (AE1/AE3) and CK20 immunostains]  5/26/22  Final Diagnosis   A  Urinary Bladder, Right posterior bladder tumor, Transurethral resection:  - Invasive high-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 (detrusor muscle)      B  Urinary Bladder, Right bladder tumor deep, Transurethral resection:  - Invasive high-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 (detrusor muscle)  Electronically        PROCEDURE:     No results found for this or any previous visit (from the past 2 hour(s))       ASSESSMENT:     74 y o  male  with muscle invasive bladder cancer status post aborted chemotherapy and completed radiation, right ureteral obstruction from tumor now with JJ stent, evidence of distal urethral stricture    PLAN:

## 2023-02-13 ENCOUNTER — PATIENT OUTREACH (OUTPATIENT)
Dept: CASE MANAGEMENT | Facility: OTHER | Age: 73
End: 2023-02-13

## 2023-02-13 NOTE — PROGRESS NOTES
OSW called Rosibel today  She shared they had a good visit with urology last week and Felecia Bowles will have a new catheter placed in April with the hope it can remain in longer than 3 months  She also s/w medical oncology who is recommending a biopsy of the lung nodule that was found before planning any more chemo or radiation  She feels this plan is appropriate and is appreciative of provider not being too aggressive at this time  She is reporting feeling better emotionally and coping well, but would like continued calls from this writer for support and the ability to share her feelings

## 2023-02-13 NOTE — PRE-PROCEDURE INSTRUCTIONS
Pre-procedure Instructions for Interventional Radiology  [address] [place] [address] Dr  [place] [address] [phone]    You are scheduled for a/an Right Lung Mass Biopsy    On Monday 2/20/23    Your tentative arrival time is 0715  Short stay will notify you the day before your procedure with the exact arrival time and the location to arrive  To prepare for your procedure:  1  Please arrange for someone to drive you home after the procedure and stay with you until the next morning if you are instructed to do so  This is typically for patients receiving some type of sedative or anesthetic for the procedure  2  DO NOT EAT OR DRINK ANYTHING after midnight on the evening before your procedure including candy & gum   3  ONLY SIPS OF WATER with your medications are allowed on the morning of your procedure  4  TAKE ALL OF YOUR REGULAR MEDICATIONS THE MORNING OF YOUR PROCEDURE with sips of water! We may call you to stop some of your blood sugar, blood pressure and blood thinning medications depending on the procedure  Please take all of these medications unless we instruct you to stop them  5  If you have an allergy to x-ray dye, please contact Interventional Radiology for an x-ray dye preparation which usually consists of an oral steroid and Benadryl  The day of your procedure:  1  Bring a list of the medications you take at home  2  Bring medications you take for breathing problems (such as inhalers), medications for chest pain, or both  3  Bring a case for your glasses or contacts  4  Bring your insurance card and a form of photo ID   5  Please leave all valuables such as credit cards and jewelry at home  6  Report to the registration desk in the main lobby at the [facility]  Ask to be directed to [facility]  7  While your procedure is being performed, your family may wait in the Radiology Waiting Room on the 1st floor in Radiology  if they need to leave, they may provide a number to be called following the procedure  8  Be prepared to stay overnight just in case  Sometimes procedures will indicate the need for further observation or treatment  9  If you are scheduled for a follow-up visit with the Interventional Radiologist after your procedure, you will be called with a date and time  Special Instructions (Medications to stop taking before your procedure etc ):  Plavix LING 2/14/23  Above reviewed with his Significant Other Gildardo

## 2023-02-20 ENCOUNTER — HOSPITAL ENCOUNTER (OUTPATIENT)
Dept: RADIOLOGY | Facility: HOSPITAL | Age: 73
Discharge: HOME | End: 2023-02-20
Attending: RADIOLOGY

## 2023-02-20 VITALS
DIASTOLIC BLOOD PRESSURE: 62 MMHG | RESPIRATION RATE: 16 BRPM | TEMPERATURE: 96.9 F | BODY MASS INDEX: 21.79 KG/M2 | HEIGHT: 63 IN | WEIGHT: 123 LBS | OXYGEN SATURATION: 98 % | SYSTOLIC BLOOD PRESSURE: 123 MMHG | HEART RATE: 68 BPM

## 2023-02-20 DIAGNOSIS — R91.1 PULMONARY NODULE: ICD-10-CM

## 2023-02-20 DIAGNOSIS — C67.0 MALIGNANT NEOPLASM OF TRIGONE OF URINARY BLADDER (HCC): ICD-10-CM

## 2023-02-20 LAB
ERYTHROCYTE [DISTWIDTH] IN BLOOD BY AUTOMATED COUNT: 15.9 % (ref 11.6–15.1)
HCT VFR BLD AUTO: 28.9 % (ref 36.5–49.3)
HGB BLD-MCNC: 8.7 G/DL (ref 12–17)
INR PPP: 1.02 (ref 0.84–1.19)
MCH RBC QN AUTO: 23.6 PG (ref 26.8–34.3)
MCHC RBC AUTO-ENTMCNC: 30.1 G/DL (ref 31.4–37.4)
MCV RBC AUTO: 79 FL (ref 82–98)
PLATELET # BLD AUTO: 88 THOUSANDS/UL (ref 149–390)
PMV BLD AUTO: 11.5 FL (ref 8.9–12.7)
PROTHROMBIN TIME: 13.6 SECONDS (ref 11.6–14.5)
RBC # BLD AUTO: 3.68 MILLION/UL (ref 3.88–5.62)
WBC # BLD AUTO: 4.64 THOUSAND/UL (ref 4.31–10.16)

## 2023-02-20 RX ORDER — MIDAZOLAM HYDROCHLORIDE 2 MG/2ML
INJECTION, SOLUTION INTRAMUSCULAR; INTRAVENOUS AS NEEDED
Status: COMPLETED | OUTPATIENT
Start: 2023-02-20 | End: 2023-02-20

## 2023-02-20 RX ORDER — ACETAMINOPHEN 325 MG/1
650 TABLET ORAL EVERY 4 HOURS PRN
Status: DISCONTINUED | OUTPATIENT
Start: 2023-02-20 | End: 2023-02-21 | Stop reason: HOSPADM

## 2023-02-20 RX ORDER — SODIUM CHLORIDE 9 MG/ML
75 INJECTION, SOLUTION INTRAVENOUS CONTINUOUS
Status: DISCONTINUED | OUTPATIENT
Start: 2023-02-20 | End: 2023-02-21 | Stop reason: HOSPADM

## 2023-02-20 RX ORDER — LIDOCAINE HYDROCHLORIDE 10 MG/ML
INJECTION, SOLUTION EPIDURAL; INFILTRATION; INTRACAUDAL; PERINEURAL AS NEEDED
Status: COMPLETED | OUTPATIENT
Start: 2023-02-20 | End: 2023-02-20

## 2023-02-20 RX ORDER — FENTANYL CITRATE 50 UG/ML
INJECTION, SOLUTION INTRAMUSCULAR; INTRAVENOUS AS NEEDED
Status: COMPLETED | OUTPATIENT
Start: 2023-02-20 | End: 2023-02-20

## 2023-02-20 RX ORDER — OXYCODONE HYDROCHLORIDE 5 MG/1
5 TABLET ORAL EVERY 4 HOURS PRN
Status: COMPLETED | OUTPATIENT
Start: 2023-02-20 | End: 2023-02-20

## 2023-02-20 RX ADMIN — LIDOCAINE HYDROCHLORIDE 10 ML: 10 INJECTION, SOLUTION EPIDURAL; INFILTRATION; INTRACAUDAL; PERINEURAL at 08:42

## 2023-02-20 RX ADMIN — MIDAZOLAM 1 MG: 1 INJECTION INTRAMUSCULAR; INTRAVENOUS at 08:38

## 2023-02-20 RX ADMIN — FENTANYL CITRATE 50 MCG: 50 INJECTION, SOLUTION INTRAMUSCULAR; INTRAVENOUS at 08:28

## 2023-02-20 RX ADMIN — FENTANYL CITRATE 50 MCG: 50 INJECTION, SOLUTION INTRAMUSCULAR; INTRAVENOUS at 08:38

## 2023-02-20 RX ADMIN — MIDAZOLAM 1 MG: 1 INJECTION INTRAMUSCULAR; INTRAVENOUS at 08:28

## 2023-02-20 RX ADMIN — FENTANYL CITRATE 50 MCG: 50 INJECTION, SOLUTION INTRAMUSCULAR; INTRAVENOUS at 08:44

## 2023-02-20 RX ADMIN — MIDAZOLAM 1 MG: 1 INJECTION INTRAMUSCULAR; INTRAVENOUS at 08:44

## 2023-02-20 RX ADMIN — OXYCODONE HYDROCHLORIDE 5 MG: 5 TABLET ORAL at 10:26

## 2023-02-20 NOTE — DISCHARGE INSTRUCTIONS
Needle Biopsy of the Lung    WHAT YOU NEED TO KNOW:  A needle biopsy of the lung is a procedure to remove cells or tissue from your lung  You may have a fine needle aspiration biopsy (FNAB), or a core needle biopsy (CNB)  A FNAB is used to remove cells through a thin needle  CNB uses a thicker needle to remove lung tissue  The samples are collected and tested for inflammation, infection, or cancer  DISCHARGE INSTRUCTIONS:   Resume your normal diet  Small sips of flat soda will help with nausea  Limit your activity for 24 hours  Wound Care:      - Remove band aid in 24 hours      Contact Interventional Radiology at [phone] [place] PATIENTS: Contact Interventional Radiology at [phone]) [place] PATIENTS: Contact Interventional Radiology at [phone]) if any of the following occur:    - You have a fever greater than 101*    - You cough up large amounts of bright red blood     - You have chest pain with breathing    - You have shortness of breath    -You have persistent nausea and vomiting    - You have pus, redness or swelling around your biopsy site    - You have questions or concerns about your condition or care

## 2023-02-20 NOTE — BRIEF OP NOTE (RAD/CATH)
INTERVENTIONAL RADIOLOGY PROCEDURE NOTE    Date: 2/20/2023    Procedure:   Procedure Summary     Date: 02/20/23 Room / Location: [address] [place] [address]    Anesthesia Start:  Anesthesia Stop:     Procedure: IR BIOPSY LUNG Diagnosis:       Pulmonary nodule      Malignant neoplasm of trigone of urinary bladder (HCC)      (enlarging lung nodule r/o progression)    Scheduled Providers: Corey Rodney MD Responsible Provider:     Anesthesia Type: Not recorded ASA Status: Not recorded          Preoperative diagnosis:   1  Pulmonary nodule    2  Malignant neoplasm of trigone of urinary bladder (HCC)         Postoperative diagnosis: Same  Surgeon: Mai Keen MD     Assistant: None  No qualified resident was available  Blood loss: Minimal    Specimens: 2 passes 18-gauge core    Findings: Right upper lobe lung nodule sampled with CT guidance  Diagnostic material obtained preliminarily  Complications: None immediate      Anesthesia: conscious sedation

## 2023-02-20 NOTE — SEDATION DOCUMENTATION
Right upper lobe lung biopsy performed by Dr Teo Sanchez  Procedure tolerated well, VSS  IR Procedure Bedrest Start Time is 0910 X 2hrs

## 2023-02-22 ENCOUNTER — HOSPITAL ENCOUNTER (OUTPATIENT)
Dept: INFUSION CENTER | Facility: HOSPITAL | Age: 73
Discharge: HOME | End: 2023-02-22

## 2023-02-22 DIAGNOSIS — Z95.828 PORT-A-CATH IN PLACE: Primary | ICD-10-CM

## 2023-02-22 NOTE — PROGRESS NOTES
Pt here for port flush  Port accessed, flushed, and de-accessed per protocol and without complication  Pt given AVS and print out of future appts

## 2023-02-24 ENCOUNTER — HOSPITAL ENCOUNTER (INPATIENT)
Facility: HOSPITAL | Age: 73
LOS: 4 days | Discharge: HOME | DRG: 200 | End: 2023-03-01
Attending: EMERGENCY MEDICINE | Admitting: INTERNAL MEDICINE
Payer: COMMERCIAL

## 2023-02-24 ENCOUNTER — APPOINTMENT (EMERGENCY)
Dept: RADIOLOGY | Facility: HOSPITAL | Age: 73
DRG: 200 | End: 2023-02-24
Payer: COMMERCIAL

## 2023-02-24 DIAGNOSIS — J95.811 PNEUMOTHORAX AFTER BIOPSY: Primary | ICD-10-CM

## 2023-02-24 DIAGNOSIS — R30.0 DYSURIA: ICD-10-CM

## 2023-02-24 DIAGNOSIS — G47.00 INSOMNIA, UNSPECIFIED TYPE: ICD-10-CM

## 2023-02-24 LAB
2HR DELTA HS TROPONIN: 1 NG/L
ANION GAP SERPL CALCULATED.3IONS-SCNC: 5 MMOL/L (ref 4–13)
ATRIAL RATE: 73 BPM
BASOPHILS # BLD AUTO: 0.02 THOUSANDS/ÂΜL (ref 0–0.1)
BASOPHILS NFR BLD AUTO: 0 % (ref 0–1)
BUN SERPL-MCNC: 38 MG/DL (ref 5–25)
CALCIUM SERPL-MCNC: 8.8 MG/DL (ref 8.3–10.1)
CARDIAC TROPONIN I PNL SERPL HS: 5 NG/L
CARDIAC TROPONIN I PNL SERPL HS: 6 NG/L
CHLORIDE SERPL-SCNC: 110 MMOL/L (ref 96–108)
CO2 SERPL-SCNC: 22 MMOL/L (ref 21–32)
CREAT SERPL-MCNC: 2.55 MG/DL (ref 0.6–1.3)
EOSINOPHIL # BLD AUTO: 0.29 THOUSAND/ÂΜL (ref 0–0.61)
EOSINOPHIL NFR BLD AUTO: 5 % (ref 0–6)
ERYTHROCYTE [DISTWIDTH] IN BLOOD BY AUTOMATED COUNT: 15.8 % (ref 11.6–15.1)
GFR SERPL CREATININE-BSD FRML MDRD: 23 ML/MIN/1.73SQ M
GLUCOSE SERPL-MCNC: 105 MG/DL (ref 65–140)
GLUCOSE SERPL-MCNC: 125 MG/DL (ref 65–140)
HCT VFR BLD AUTO: 27.3 % (ref 36.5–49.3)
HGB BLD-MCNC: 8.6 G/DL (ref 12–17)
IMM GRANULOCYTES # BLD AUTO: 0.03 THOUSAND/UL (ref 0–0.2)
IMM GRANULOCYTES NFR BLD AUTO: 1 % (ref 0–2)
LYMPHOCYTES # BLD AUTO: 0.96 THOUSANDS/ÂΜL (ref 0.6–4.47)
LYMPHOCYTES NFR BLD AUTO: 15 % (ref 14–44)
MCH RBC QN AUTO: 24.4 PG (ref 26.8–34.3)
MCHC RBC AUTO-ENTMCNC: 31.5 G/DL (ref 31.4–37.4)
MCV RBC AUTO: 77 FL (ref 82–98)
MONOCYTES # BLD AUTO: 0.53 THOUSAND/ÂΜL (ref 0.17–1.22)
MONOCYTES NFR BLD AUTO: 8 % (ref 4–12)
NEUTROPHILS # BLD AUTO: 4.47 THOUSANDS/ÂΜL (ref 1.85–7.62)
NEUTS SEG NFR BLD AUTO: 71 % (ref 43–75)
NRBC BLD AUTO-RTO: 0 /100 WBCS
P AXIS: -20 DEGREES
PLATELET # BLD AUTO: 105 THOUSANDS/UL (ref 149–390)
PMV BLD AUTO: 11.5 FL (ref 8.9–12.7)
POTASSIUM SERPL-SCNC: 4.4 MMOL/L (ref 3.5–5.3)
PR INTERVAL: 144 MS
QRS AXIS: -16 DEGREES
QRSD INTERVAL: 78 MS
QT INTERVAL: 362 MS
QTC INTERVAL: 398 MS
RBC # BLD AUTO: 3.53 MILLION/UL (ref 3.88–5.62)
SODIUM SERPL-SCNC: 137 MMOL/L (ref 135–147)
T WAVE AXIS: 20 DEGREES
VENTRICULAR RATE: 73 BPM
WBC # BLD AUTO: 6.3 THOUSAND/UL (ref 4.31–10.16)

## 2023-02-24 PROCEDURE — 99285 EMERGENCY DEPT VISIT HI MDM: CPT | Performed by: EMERGENCY MEDICINE

## 2023-02-24 PROCEDURE — 85025 COMPLETE CBC W/AUTO DIFF WBC: CPT | Performed by: EMERGENCY MEDICINE

## 2023-02-24 PROCEDURE — 99285 EMERGENCY DEPT VISIT HI MDM: CPT

## 2023-02-24 PROCEDURE — 36415 COLL VENOUS BLD VENIPUNCTURE: CPT | Performed by: EMERGENCY MEDICINE

## 2023-02-24 PROCEDURE — 99223 1ST HOSP IP/OBS HIGH 75: CPT | Performed by: GENERAL PRACTICE

## 2023-02-24 PROCEDURE — 71045 X-RAY EXAM CHEST 1 VIEW: CPT

## 2023-02-24 PROCEDURE — 96374 THER/PROPH/DIAG INJ IV PUSH: CPT

## 2023-02-24 PROCEDURE — 84484 ASSAY OF TROPONIN QUANT: CPT | Performed by: EMERGENCY MEDICINE

## 2023-02-24 PROCEDURE — 80048 BASIC METABOLIC PNL TOTAL CA: CPT | Performed by: EMERGENCY MEDICINE

## 2023-02-24 PROCEDURE — 93010 ELECTROCARDIOGRAM REPORT: CPT | Performed by: INTERNAL MEDICINE

## 2023-02-24 PROCEDURE — 82948 REAGENT STRIP/BLOOD GLUCOSE: CPT

## 2023-02-24 PROCEDURE — 93005 ELECTROCARDIOGRAM TRACING: CPT

## 2023-02-24 RX ORDER — TAMSULOSIN HYDROCHLORIDE 0.4 MG/1
0.4 CAPSULE ORAL
Status: DISCONTINUED | OUTPATIENT
Start: 2023-02-25 | End: 2023-03-01 | Stop reason: HOSPADM

## 2023-02-24 RX ORDER — CLOPIDOGREL BISULFATE 75 MG/1
75 TABLET ORAL DAILY
Status: DISCONTINUED | OUTPATIENT
Start: 2023-02-25 | End: 2023-03-01 | Stop reason: HOSPADM

## 2023-02-24 RX ORDER — ZOLPIDEM TARTRATE 5 MG/1
5 TABLET ORAL
Status: DISCONTINUED | OUTPATIENT
Start: 2023-02-24 | End: 2023-03-01 | Stop reason: HOSPADM

## 2023-02-24 RX ORDER — ACETAMINOPHEN 325 MG/1
975 TABLET ORAL EVERY 8 HOURS SCHEDULED
Status: DISCONTINUED | OUTPATIENT
Start: 2023-02-24 | End: 2023-02-28

## 2023-02-24 RX ORDER — OXYBUTYNIN CHLORIDE 5 MG/1
5 TABLET ORAL 2 TIMES DAILY PRN
Status: DISCONTINUED | OUTPATIENT
Start: 2023-02-24 | End: 2023-03-01 | Stop reason: HOSPADM

## 2023-02-24 RX ORDER — INSULIN LISPRO 100 [IU]/ML
1-5 INJECTION, SOLUTION INTRAVENOUS; SUBCUTANEOUS
Status: DISCONTINUED | OUTPATIENT
Start: 2023-02-25 | End: 2023-03-01 | Stop reason: HOSPADM

## 2023-02-24 RX ORDER — INSULIN LISPRO 100 [IU]/ML
1-5 INJECTION, SOLUTION INTRAVENOUS; SUBCUTANEOUS
Status: DISCONTINUED | OUTPATIENT
Start: 2023-02-24 | End: 2023-03-01 | Stop reason: HOSPADM

## 2023-02-24 RX ORDER — TEMAZEPAM 7.5 MG/1
7.5 CAPSULE ORAL
Status: DISCONTINUED | OUTPATIENT
Start: 2023-02-24 | End: 2023-03-01 | Stop reason: HOSPADM

## 2023-02-24 RX ORDER — TACROLIMUS 1 MG/1
1 CAPSULE ORAL EVERY 12 HOURS
Status: DISCONTINUED | OUTPATIENT
Start: 2023-02-24 | End: 2023-03-01 | Stop reason: HOSPADM

## 2023-02-24 RX ORDER — HYDROMORPHONE HCL IN WATER/PF 6 MG/30 ML
0.2 PATIENT CONTROLLED ANALGESIA SYRINGE INTRAVENOUS ONCE
Status: COMPLETED | OUTPATIENT
Start: 2023-02-24 | End: 2023-02-24

## 2023-02-24 RX ORDER — LIDOCAINE HYDROCHLORIDE 20 MG/ML
1 JELLY TOPICAL DAILY PRN
Status: DISCONTINUED | OUTPATIENT
Start: 2023-02-24 | End: 2023-03-01 | Stop reason: HOSPADM

## 2023-02-24 RX ORDER — OXYCODONE HYDROCHLORIDE 5 MG/1
2.5 TABLET ORAL EVERY 4 HOURS PRN
Status: DISCONTINUED | OUTPATIENT
Start: 2023-02-24 | End: 2023-02-26

## 2023-02-24 RX ADMIN — TEMAZEPAM 7.5 MG: 7.5 CAPSULE ORAL at 21:33

## 2023-02-24 RX ADMIN — TACROLIMUS 1 MG: 1 CAPSULE ORAL at 22:18

## 2023-02-24 RX ADMIN — HYDROMORPHONE HYDROCHLORIDE 0.2 MG: 0.2 INJECTION, SOLUTION INTRAMUSCULAR; INTRAVENOUS; SUBCUTANEOUS at 19:26

## 2023-02-24 RX ADMIN — INSULIN DETEMIR 12 UNITS: 100 INJECTION, SOLUTION SUBCUTANEOUS at 21:43

## 2023-02-24 RX ADMIN — ZOLPIDEM TARTRATE 5 MG: 5 TABLET, COATED ORAL at 21:33

## 2023-02-24 RX ADMIN — OXYCODONE HYDROCHLORIDE 2.5 MG: 5 TABLET ORAL at 22:27

## 2023-02-24 RX ADMIN — ACETAMINOPHEN 975 MG: 325 TABLET, FILM COATED ORAL at 21:33

## 2023-02-24 NOTE — ED PROVIDER NOTES
History  Chief Complaint   Patient presents with   • Shortness of Breath     Patient had a lung biopsy on Monday, started with sob and cough Tuesday  26-year-old male with a past medical history of cirrhosis status post liver transplant, diabetes, CKD, and previous bladder cancer who presents for evaluation with right-sided chest pain and shortness of breath  Patient had a biopsy of a nodule in his right lung performed on 2/20  Patient states that starting the day after the biopsy, he developed right-sided chest pain and worsening shortness of breath  He endorses worsening pain and difficulty with a deep breath  He denies any worsening cough, or recent illness including fevers or chills  Prior to Admission Medications   Prescriptions Last Dose Informant Patient Reported? Taking?    Alcohol Swabs (B-D SINGLE USE SWABS REGULAR) PADS   Yes No   Sig: USE 2-3 TIMES DAILY AS NEEDED   Blood Glucose Calibration (OT ULTRA/FASTTK CNTRL SOLN) SOLN   No No   Sig: USE AS DIRECTED   Comfort EZ Pen Needles 32G X 4 MM MISC   No No   Sig: USE 3-4 AS DIRECTED   Continuous Blood Gluc  (FreeStyle Frank 14 Day [place]) NATALIA   No No   Sig: Use 1 Device continuous   Continuous Blood Gluc Sensor (FreeStyle Frank 14 Day Sensor) [facility] [place]   No No   Sig: Use 1 Device 4 (four) times a day   HYDROcodone-acetaminophen (Norco) 5-325 mg per tablet   No No   Sig: Take 1 tablet by mouth every 6 (six) hours as needed for pain Max Daily Amount: 4 tablets   Patient not taking: Reported on 1/25/2023   Incontinence Supply Disposable (Incontinence Brief Medium) MISC   No No   Sig: Use 4 (four) times a day   Lancet Devices (Lancing Device) MISC   No No   Sig: USE AS DIRECTED   Nutritional Supplements (Glucerna Shake) LIQD   No No   Sig: Take 1 Bottle by mouth 3 (three) times a day   OneTouch Ultra test strip   No No   Sig: TEST 3-4 TIMES A DAY   acetaminophen (TYLENOL) 325 mg tablet   No No   Sig: Take 3 tablets (975 mg total) by mouth every 8 (eight) hours   clopidogrel (PLAVIX) 75 mg tablet   No No   Sig: Take 1 tablet (75 mg total) by mouth daily Do not start before January 23, 2023     insulin detemir (Levemir FlexTouch) 100 Units/mL injection pen   No No   Sig: Inject 15 Units under the skin daily at bedtime   loperamide (IMODIUM) 2 mg capsule   No No   Sig: Take 1 capsule (2 mg total) by mouth 2 (two) times a day as needed for diarrhea Gay carmen tableta dos veces por cynthia si tiene diarrea   oxybutynin (DITROPAN) 5 mg tablet   No No   Sig: Take 1 tablet (5 mg total) by mouth 2 (two) times a day as needed (bladder spasms)   rosuvastatin (CRESTOR) 40 MG tablet   No No   Sig: TAKE ONE (1) TABLET BY MOUTH DAILY   sodium chloride   Yes No   Patient not taking: Reported on 2/7/2023   tacrolimus (PROGRAF) 1 mg capsule   No No   Sig: TAKE 1 CAPSULE (1 MG TOTAL) BY MOUTH EVERY 12 (TWELVE) HOURS   tamsulosin (FLOMAX) 0 4 mg   No No   Sig: Take 1 capsule (0 4 mg total) by mouth daily with dinner   temazepam (RESTORIL) 7 5 mg capsule   No No   Sig: TAKE 2 CAPSULES (15 MG TOTAL) BY MOUTH DAILY AT BEDTIME AS NEEDED FOR SLEEP   zolpidem (AMBIEN) 5 mg tablet   Yes No   Sig: Take 5 mg by mouth daily at bedtime as needed      Facility-Administered Medications Last Administration Doses Remaining   lidocaine (URO-JET, GLYDO) 2 % urethral/mucosal gel 1 application None recorded 15          Past Medical History:   Diagnosis Date   • Acute urinary retention 09/12/2022   • Alcoholism ([facility] [address] )    • Anemia    • Bladder cancer (HCC)    • Cerebral artery aneurysm    • Change in mental state     last assessed 5/18/15; resolved 10/27/15   • Chronic kidney disease    • COVID-19     2022 not hospitalized fully recovered   • Diabetes mellitus ([facility] [address] )    • Drug dependence ([facility] [address] )    • Fatigue     last assessed 1/26/15; resolved 5/24/16   • Hepatitis C    • Hospital discharge follow-up 12/21/2018   • Hx of liver transplant [facility])    • Hydronephrosis of right kidney    • Kidney disease • 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[facility] [address] )    • Liver cirrhosis ([facility] [address] )    • Liver transplant recipient [facility])    • Renal disorder    • Stroke (cerebrum) ([facility] [address] )    • Stroke ([facility] [address] )     diff short term memory   • Subdural hygroma     2/27/14; resolved 7/28/15   • Thrombocytopenia ([facility] [address] ) 09/20/2017       Past Surgical History:   Procedure Laterality Date   • BRAIN SURGERY  02/12/2014    left frontotemporal 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   • CATARACT EXTRACTION     • CHOLECYSTECTOMY     • CYSTOSCOPY  11/30/2022    [place]   • FL LUMBAR PUNCTURE DIAGNOSTIC  12/14/2018   • FL RETROGRADE PYELOGRAM  1/18/2023   • HEPATITIS B SURFACE AB QN,LIVER TRANSPLANT (HISTORICAL)     • IR BIOPSY LUNG  2/20/2023   • IR NEPHROSTOMY TUBE CHECK/CHANGE/REPOSITION/REINSERTION/UPSIZE  07/29/2022   • IR NEPHROSTOMY TUBE CHECK/CHANGE/REPOSITION/REINSERTION/UPSIZE  08/31/2022   • IR NEPHROSTOMY TUBE CHECK/CHANGE/REPOSITION/REINSERTION/UPSIZE  10/07/2022   • IR NEPHROSTOMY TUBE CHECK/CHANGE/REPOSITION/REINSERTION/UPSIZE  12/3/2022   • IR NEPHROSTOMY TUBE PLACEMENT  05/28/2022   • IR PICC LINE  12/14/2018   • IR PORT PLACEMENT  06/23/2022   • LIVER TRANSPLANTATION     • NEPHROSTOMY TUBE CHANGE N/A 1/18/2023    Procedure: Removal of  percutaneous nephroureteral catheter;  Surgeon: Power Padilla MD;  Location: [facility];  Service: Urology   • LA CYSTO BLADDER W/URETERAL CATHETERIZATION Right 1/18/2023    Procedure: URETHRAL DILATION, CYSTOGRAM, CYSTOSCOPY, RIGHT RETEROGRADE PYELOGRAM, [phone], COMPLEX CHICAS PLACEMENT;  Surgeon: Power Padilla MD;  Location: [facility];  Service: Urology   • ROTATOR CUFF REPAIR     • SHOULDER SURGERY     • TRANSURETHRAL RESECTION OF BLADDER TUMOR N/A 05/26/2022    Procedure: TRANSURETHRAL RESECTION OF BLADDER TUMOR (TURBT);   Surgeon: Power Padilla MD;  [facility]: [facility];  Service: Urology       Family History   Problem Relation Age of Onset • Hypertension Mother         benign essential    • Lung cancer Father    • Diabetes Sister    • Cancer Brother    • Stroke Other         cva - due to embolism of cerebra artery      I have reviewed and agree with the history as documented  E-Cigarette/Vaping   • E-Cigarette Use Never User      E-Cigarette/Vaping Substances   • Nicotine No    • THC No    • CBD No    • Flavoring No    • Other No    • Unknown No      Social History     Tobacco Use   • Smoking status: Former     Packs/day:  00     Types: Cigarettes     Quit date:      Years since quittin 1   • Smokeless tobacco: Never   • Tobacco comments:     former smoker per allscripts    Vaping Use   • Vaping Use: Never used   Substance Use Topics   • Alcohol use: Not Currently   • Drug use: No     Comment: remotely quit drug use per allscripts         Review of Systems   Constitutional: Negative for fever  HENT: Negative for congestion  Respiratory: Positive for shortness of breath  Negative for cough  Cardiovascular: Positive for chest pain  Gastrointestinal: Negative for abdominal pain, nausea and vomiting  All other systems reviewed and are negative  Physical Exam  ED Triage Vitals   Temperature Pulse Respirations Blood Pressure SpO2   23 1610 23 1611 23 1611 23 1611 23 1611   98 1 °F (36 7 °C) 71 20 153/74 99 %      Temp Source Heart Rate Source Patient Position - Orthostatic VS BP Location FiO2 (%)   23 1610 23 1611 23 1611 23 1611 --   Oral Monitor Sitting Right arm       Pain Score       23 1847       7             Orthostatic Vital Signs  Vitals:    23 1611 23 1847   BP: 153/74 145/64   Pulse: 71 75   Patient Position - Orthostatic VS: Sitting Lying       Physical Exam  Vitals and nursing note reviewed  Constitutional:       General: He is awake  He is not in acute distress  Appearance: He is not toxic-appearing     HENT:      Head: Normocephalic and atraumatic  Eyes:      General: Vision grossly intact  Gaze aligned appropriately  Cardiovascular:      Rate and Rhythm: Normal rate and regular rhythm  Heart sounds: Normal heart sounds  Pulmonary:      Effort: Pulmonary effort is normal  No respiratory distress  Breath sounds: Decreased breath sounds (R>L) present  Musculoskeletal:      Cervical back: Full passive range of motion without pain and neck supple  Skin:     General: Skin is warm and dry  Neurological:      General: No focal deficit present  Mental Status: He is alert and oriented to person, place, and time           ED Medications  Medications   HYDROmorphone HCl (DILAUDID) injection 0 2 mg (0 2 mg Intravenous Given 2/24/23 1926)       Diagnostic Studies  Results Reviewed     Procedure Component Value Units Date/Time    HS Troponin 0hr (reflex protocol) [[id number]]  (Normal) Collected: 02/24/23 1839    Lab Status: Final result Specimen: Blood from Central Venous Line Updated: 02/24/23 1921     hs TnI 0hr 5 ng/L     HS Troponin I 2hr [[id number]]     Lab Status: No result Specimen: Blood     Basic metabolic panel [[id number]]  (Abnormal) Collected: 02/24/23 1839    Lab Status: Final result Specimen: Blood from Central Venous Line Updated: 02/24/23 1903     Sodium 137 mmol/L      Potassium 4 4 mmol/L      Chloride 110 mmol/L      CO2 22 mmol/L      ANION GAP 5 mmol/L      BUN 38 mg/dL      Creatinine 2 55 mg/dL      Glucose 105 mg/dL      Calcium 8 8 mg/dL      eGFR 23 ml/min/1 73sq m     Narrative:      [place] guidelines for Chronic Kidney Disease (CKD):   •  Stage 1 with normal or high GFR (GFR > 90 mL/min/1 73 square meters)  •  Stage 2 Mild CKD (GFR = 60-89 mL/min/1 73 square meters)  •  Stage 3A Moderate CKD (GFR = 45-59 mL/min/1 73 square meters)  •  Stage 3B Moderate CKD (GFR = 30-44 mL/min/1 73 square meters)  •  Stage 4 Severe CKD (GFR = 15-29 mL/min/1 73 square meters)  •  Stage 5 End Stage CKD (GFR <15 mL/min/1 73 square meters)  Note: GFR calculation is accurate only with a steady state creatinine    CBC and differential [[id number]]  (Abnormal) Collected: 02/24/23 1839    Lab Status: Final result Specimen: Blood from Central Venous Line Updated: 02/24/23 1847     WBC 6 30 Thousand/uL      RBC 3 53 Million/uL      Hemoglobin 8 6 g/dL      Hematocrit 27 3 %      MCV 77 fL      MCH 24 4 pg      MCHC 31 5 g/dL      RDW 15 8 %      MPV 11 5 fL      Platelets 140 Thousands/uL      nRBC 0 /100 WBCs      Neutrophils Relative 71 %      Immat GRANS % 1 %      Lymphocytes Relative 15 %      Monocytes Relative 8 %      Eosinophils Relative 5 %      Basophils Relative 0 %      Neutrophils Absolute 4 47 Thousands/µL      Immature Grans Absolute 0 03 Thousand/uL      Lymphocytes Absolute 0 96 Thousands/µL      Monocytes Absolute 0 53 Thousand/µL      Eosinophils Absolute 0 29 Thousand/µL      Basophils Absolute 0 02 Thousands/µL                  XR chest 1 view portable   Final Result by Vero Stanley MD (02/24 1636)   1  Tiny right apical pneumothorax after recent lung nodule biopsy  Mild soft tissue emphysema also noted right chest wall  2   Right apex nodule corresponding to recent biopsy target  The study was marked in [facility] for immediate notification        Workstation performed: [id number]               Procedures  ECG 12 Lead Documentation Only    Date/Time: 2/24/2023 4:15 PM  Performed by: Rizwana Guerra DO  Authorized by: Rizwana Guerra DO     Indications / Diagnosis:  Shortness of breath  ECG reviewed by me, the ED Provider: yes    Patient location:  ED  Previous ECG:     Previous ECG:  Compared to current    Similarity:  No change    Comparison to cardiac monitor: Yes    Interpretation:     Interpretation: normal    Rate:     ECG rate:  73    ECG rate assessment: normal    Rhythm:     Rhythm: sinus rhythm    Ectopy:     Ectopy: none    QRS:     QRS axis:  Left    QRS intervals: Normal  Conduction:     Conduction: normal    ST segments:     ST segments:  Normal  T waves:     T waves: flattening      Flattening:  III          ED Course  ED Course as of 02/24/23 1940 Fri Feb 24, 2023   1652 XR chest 1 view portable  Tiny right apical pneumothorax after recent lung nodule biopsy  Mild soft tissue emphysema also noted right chest wall   1800 Patient placed on 2L via NC  Plan for basic labs and admission for observation  1902 Hemoglobin(!): 8 6  At baseline   1903 Creatinine(!): 2 55  Similar to previous                                       Medical Decision Making  68-year-old male presents for evaluation with shortness of breath after recent lung biopsy  On exam, patient in no acute distress, vitals within normal limits  Lungs with decreased breath sounds bilaterally, but decreased more over the right lung  Concern for possible pneumothorax versus other cardiopulmonary pathology  Patient evaluated with an upright chest x-ray, as well as a cardiac evaluation  Chest x-ray concerning for small right apical pneumothorax with subcutaneous air  Patient was placed on 2L via nasal cannula for treatment  EKG with normal sinus rhythm, troponin negative  Rest of workup unremarkable  Given the possibility of worsening of condition, decision was made to admit patient for further observation and treatment  Patient admitted to medicine service in stable condition  Pneumothorax after biopsy: self-limited or minor problem  Amount and/or Complexity of Data Reviewed  Labs: ordered  Decision-making details documented in ED Course  Radiology: ordered  Decision-making details documented in ED Course  ECG/medicine tests: ordered and independent interpretation performed  Risk  Prescription drug management  Decision regarding hospitalization              Disposition  Final diagnoses:   Pneumothorax after biopsy     Time reflects when diagnosis was documented in both MDM as applicable and the Disposition within this note     Time User Action Codes Description Comment    2/24/2023  7:39 PM Devi Newton Add [F21 [id number]] Pneumothorax after biopsy       ED Disposition     ED Disposition   Admit    Condition   Stable    Date/Time   Fri Feb 24, 2023  7:40 PM    Comment   Case was discussed with NILTON and the patient's admission status was agreed to be Admission Status: observation status to the service of Dr Sissy Luna   Follow-up Information    None         Patient's Medications   Discharge Prescriptions    No medications on file     No discharge procedures on file  PDMP Review       Value Time User    PDMP Reviewed  Yes 12/20/2022  2:08 PM Velasquez Figueredo DO           ED Provider  Attending physically available and evaluated Artelia Ards  I managed the patient along with the ED Attending      Electronically Signed by         Ifeoma West DO  02/27/23 5173

## 2023-02-24 NOTE — ED ATTENDING ATTESTATION
2/24/2023  ICaroline MD, saw and evaluated the patient  I have discussed the patient with the resident/non-physician practitioner and agree with the resident's/non-physician practitioner's findings, Plan of Care, and MDM as documented in the resident's/non-physician practitioner's note, except where noted  All available labs and Radiology studies were reviewed  I was present for key portions of any procedure(s) performed by the resident/non-physician practitioner and I was immediately available to provide assistance  At this point I agree with the current assessment done in the Emergency Department    I have conducted an independent evaluation of this patient a history and physical is as follows:    Please see merged chart    ED Course         Critical Care Time  Procedures

## 2023-02-24 NOTE — ED ATTENDING ATTESTATION
2/24/2023  Kel DACOSTA MD, saw and evaluated the patient  I have discussed the patient with the resident/non-physician practitioner and agree with the resident's/non-physician practitioner's findings, Plan of Care, and MDM as documented in the resident's/non-physician practitioner's note, except where noted  All available labs and Radiology studies were reviewed  I was present for key portions of any procedure(s) performed by the resident/non-physician practitioner and I was immediately available to provide assistance  At this point I agree with the current assessment done in the Emergency Department  I have conducted an independent evaluation of this patient a history and physical is as follows: This is an evaluation of a 77-year-old male with history of alcoholic cirrhosis status post liver transplant as well as diabetes, CKD, CAD and bladder carcinoma who presents to the emergency department with approximately 4 days of right-sided chest discomfort with shortness of breath that began after he had an IR guided biopsy of a right upper lobe lung nodule  Patient states that it is difficult to take a deep breath  He has pain over the right upper chest   Has been feeling fatigued with decreased activity secondary to the symptoms  On exam patient is nontoxic but is mildly uncomfortable appearing  Vital signs are currently stable  HEENT is normocephalic and atraumatic with moist mucous membranes and clear sclera and conjunctive a  Neck is supple forage of motion  Patient has decreased breath sounds on the right upper lung fields versus left  There is no wheezing rhonchi or rales  There is no asymmetry to the chest   Abdomen soft positive bowel sounds nontender to palpation  Trace bilateral lower extremity edema  Intact distal pulses  Capillary for less than 2 seconds  Awake alert oriented    Assessment and plan 77-year-old male status post IR, needle guided biopsy who presents with shortness of breath and chest pain  Given physical exam concern for possible pneumothorax  Will obtain stat chest x-ray  We will give pain control as needed  Will monitor on telemetry and start patient on supplemental oxygen given concern for a pneumothorax  We will get basic blood work, EKG  Treat with chest tube as needed  Patient will require admission                ED Course         Critical Care Time  Procedures

## 2023-02-25 ENCOUNTER — APPOINTMENT (OUTPATIENT)
Dept: RADIOLOGY | Facility: HOSPITAL | Age: 73
DRG: 200 | End: 2023-02-25
Attending: RADIOLOGY
Payer: COMMERCIAL

## 2023-02-25 ENCOUNTER — APPOINTMENT (OUTPATIENT)
Dept: RADIOLOGY | Facility: HOSPITAL | Age: 73
DRG: 200 | End: 2023-02-25
Payer: COMMERCIAL

## 2023-02-25 LAB
ANION GAP SERPL CALCULATED.3IONS-SCNC: 5 MMOL/L (ref 4–13)
BUN SERPL-MCNC: 38 MG/DL (ref 5–25)
CALCIUM SERPL-MCNC: 8.8 MG/DL (ref 8.3–10.1)
CHLORIDE SERPL-SCNC: 112 MMOL/L (ref 96–108)
CO2 SERPL-SCNC: 23 MMOL/L (ref 21–32)
CREAT SERPL-MCNC: 2.58 MG/DL (ref 0.6–1.3)
ERYTHROCYTE [DISTWIDTH] IN BLOOD BY AUTOMATED COUNT: 15.8 % (ref 11.6–15.1)
GFR SERPL CREATININE-BSD FRML MDRD: 23 ML/MIN/1.73SQ M
GLUCOSE P FAST SERPL-MCNC: 54 MG/DL (ref 65–99)
GLUCOSE SERPL-MCNC: 142 MG/DL (ref 65–140)
GLUCOSE SERPL-MCNC: 159 MG/DL (ref 65–140)
GLUCOSE SERPL-MCNC: 189 MG/DL (ref 65–140)
GLUCOSE SERPL-MCNC: 226 MG/DL (ref 65–140)
GLUCOSE SERPL-MCNC: 54 MG/DL (ref 65–140)
GLUCOSE SERPL-MCNC: 57 MG/DL (ref 65–140)
GLUCOSE SERPL-MCNC: 82 MG/DL (ref 65–140)
HCT VFR BLD AUTO: 27.3 % (ref 36.5–49.3)
HGB BLD-MCNC: 8.4 G/DL (ref 12–17)
MCH RBC QN AUTO: 24.2 PG (ref 26.8–34.3)
MCHC RBC AUTO-ENTMCNC: 30.8 G/DL (ref 31.4–37.4)
MCV RBC AUTO: 79 FL (ref 82–98)
PLATELET # BLD AUTO: 94 THOUSANDS/UL (ref 149–390)
PMV BLD AUTO: 11.2 FL (ref 8.9–12.7)
POTASSIUM SERPL-SCNC: 4.3 MMOL/L (ref 3.5–5.3)
RBC # BLD AUTO: 3.47 MILLION/UL (ref 3.88–5.62)
SODIUM SERPL-SCNC: 140 MMOL/L (ref 135–147)
WBC # BLD AUTO: 4.88 THOUSAND/UL (ref 4.31–10.16)

## 2023-02-25 PROCEDURE — 80048 BASIC METABOLIC PNL TOTAL CA: CPT | Performed by: GENERAL PRACTICE

## 2023-02-25 PROCEDURE — C1769 GUIDE WIRE: HCPCS | Performed by: RADIOLOGY

## 2023-02-25 PROCEDURE — 85027 COMPLETE CBC AUTOMATED: CPT | Performed by: GENERAL PRACTICE

## 2023-02-25 PROCEDURE — C1729 CATH, DRAINAGE: HCPCS | Performed by: RADIOLOGY

## 2023-02-25 PROCEDURE — 32557 INSERT CATH PLEURA W/ IMAGE: CPT | Performed by: RADIOLOGY

## 2023-02-25 PROCEDURE — 82948 REAGENT STRIP/BLOOD GLUCOSE: CPT

## 2023-02-25 PROCEDURE — NC001 PR NO CHARGE: Performed by: RADIOLOGY

## 2023-02-25 PROCEDURE — 36415 COLL VENOUS BLD VENIPUNCTURE: CPT | Performed by: GENERAL PRACTICE

## 2023-02-25 PROCEDURE — 0W9930Z DRAINAGE OF RIGHT PLEURAL CAVITY WITH DRAINAGE DEVICE, PERCUTANEOUS APPROACH: ICD-10-PCS | Performed by: RADIOLOGY

## 2023-02-25 PROCEDURE — 99223 1ST HOSP IP/OBS HIGH 75: CPT | Performed by: INTERNAL MEDICINE

## 2023-02-25 PROCEDURE — 71046 X-RAY EXAM CHEST 2 VIEWS: CPT

## 2023-02-25 PROCEDURE — 99232 SBSQ HOSP IP/OBS MODERATE 35: CPT | Performed by: PHYSICIAN ASSISTANT

## 2023-02-25 PROCEDURE — 32557 INSERT CATH PLEURA W/ IMAGE: CPT

## 2023-02-25 RX ORDER — HYDROMORPHONE HCL/PF 1 MG/ML
0.5 SYRINGE (ML) INJECTION ONCE
Status: DISCONTINUED | OUTPATIENT
Start: 2023-02-25 | End: 2023-02-26

## 2023-02-25 RX ORDER — FENTANYL CITRATE 50 UG/ML
INJECTION, SOLUTION INTRAMUSCULAR; INTRAVENOUS AS NEEDED
Status: COMPLETED | OUTPATIENT
Start: 2023-02-25 | End: 2023-02-25

## 2023-02-25 RX ORDER — LANOLIN ALCOHOL/MO/W.PET/CERES
3 CREAM (GRAM) TOPICAL
Status: DISCONTINUED | OUTPATIENT
Start: 2023-02-25 | End: 2023-03-01 | Stop reason: HOSPADM

## 2023-02-25 RX ORDER — DEXTROSE MONOHYDRATE 25 G/50ML
INJECTION, SOLUTION INTRAVENOUS
Status: COMPLETED
Start: 2023-02-25 | End: 2023-02-25

## 2023-02-25 RX ORDER — LIDOCAINE HYDROCHLORIDE 10 MG/ML
INJECTION, SOLUTION EPIDURAL; INFILTRATION; INTRACAUDAL; PERINEURAL AS NEEDED
Status: COMPLETED | OUTPATIENT
Start: 2023-02-25 | End: 2023-02-25

## 2023-02-25 RX ORDER — DEXTROSE MONOHYDRATE 25 G/50ML
25 INJECTION, SOLUTION INTRAVENOUS ONCE
Status: COMPLETED | OUTPATIENT
Start: 2023-02-25 | End: 2023-02-25

## 2023-02-25 RX ORDER — LORAZEPAM 2 MG/ML
2 INJECTION INTRAMUSCULAR ONCE
Status: COMPLETED | OUTPATIENT
Start: 2023-02-25 | End: 2023-02-25

## 2023-02-25 RX ADMIN — TACROLIMUS 1 MG: 1 CAPSULE ORAL at 08:28

## 2023-02-25 RX ADMIN — MELATONIN 3 MG: at 23:37

## 2023-02-25 RX ADMIN — ACETAMINOPHEN 975 MG: 325 TABLET, FILM COATED ORAL at 06:01

## 2023-02-25 RX ADMIN — INSULIN LISPRO 2 UNITS: 100 INJECTION, SOLUTION INTRAVENOUS; SUBCUTANEOUS at 17:28

## 2023-02-25 RX ADMIN — TACROLIMUS 1 MG: 1 CAPSULE ORAL at 21:38

## 2023-02-25 RX ADMIN — ACETAMINOPHEN 975 MG: 325 TABLET, FILM COATED ORAL at 21:35

## 2023-02-25 RX ADMIN — LORAZEPAM 2 MG: 2 INJECTION INTRAMUSCULAR; INTRAVENOUS at 02:21

## 2023-02-25 RX ADMIN — ACETAMINOPHEN 975 MG: 325 TABLET, FILM COATED ORAL at 15:30

## 2023-02-25 RX ADMIN — TAMSULOSIN HYDROCHLORIDE 0.4 MG: 0.4 CAPSULE ORAL at 15:31

## 2023-02-25 RX ADMIN — CLOPIDOGREL BISULFATE 75 MG: 75 TABLET ORAL at 08:28

## 2023-02-25 RX ADMIN — LIDOCAINE HYDROCHLORIDE 5 ML: 10 INJECTION, SOLUTION EPIDURAL; INFILTRATION; INTRACAUDAL; PERINEURAL at 14:35

## 2023-02-25 RX ADMIN — INSULIN DETEMIR 12 UNITS: 100 INJECTION, SOLUTION SUBCUTANEOUS at 21:35

## 2023-02-25 RX ADMIN — OXYCODONE HYDROCHLORIDE 2.5 MG: 5 TABLET ORAL at 17:01

## 2023-02-25 RX ADMIN — DEXTROSE MONOHYDRATE 25 ML: 25 INJECTION, SOLUTION INTRAVENOUS at 06:14

## 2023-02-25 RX ADMIN — FENTANYL CITRATE 25 MCG: 50 INJECTION, SOLUTION INTRAMUSCULAR; INTRAVENOUS at 14:37

## 2023-02-25 RX ADMIN — FENTANYL CITRATE 50 MCG: 50 INJECTION, SOLUTION INTRAMUSCULAR; INTRAVENOUS at 14:30

## 2023-02-25 RX ADMIN — TEMAZEPAM 7.5 MG: 7.5 CAPSULE ORAL at 21:44

## 2023-02-25 RX ADMIN — ZOLPIDEM TARTRATE 5 MG: 5 TABLET, COATED ORAL at 21:44

## 2023-02-25 NOTE — SEDATION DOCUMENTATION
8F right sided chest tube placed by Dr Lorraine Espinosa  Patient tolerated well, denies pain and discomfort post procedure  Transported to ED 17 and report given to Tri Justin RN  Chest tube to -20 suction

## 2023-02-25 NOTE — ASSESSMENT & PLAN NOTE
· C/w Plavix  · Pt doesn ot tolerate Lipitor and Crestor non formulary, on hold for now, can resume at d/c

## 2023-02-25 NOTE — PROGRESS NOTES
[facility]  Progress Note - Elroy Torrez 1950, 74 y o  male MRN: [id number]  Unit/Bed#: ED 17 Encounter: [id number]  Primary Care Provider: Sophia Escamilla DO   Date and time admitted to hospital: 2/24/2023  3:58 PM    * Pneumothorax after biopsy  Assessment & Plan  · Pt had biopsy of RUL 2/20 who presents with R sided chest pain  · CXR showed tiny R apical pneumothorax   · CXR this AM showed moderate-size R hydropneumothorax  · Consult IR for chest tube placement, consult pulmonology  · Currently on 2 L NC     History of CVA (cerebrovascular accident)  Assessment & Plan  · C/w Plavix  · Pt doesn ot tolerate Lipitor and Crestor non formulary, on hold for now, can resume at d/c     CKD (chronic kidney disease) stage 4, GFR 15-29 ml/min [facility])  Assessment & Plan  Lab Results   Component Value Date    EGFR 23 02/25/2023    EGFR 23 02/24/2023    EGFR 24 01/18/2023    CREATININE 2 58 (H) 02/25/2023    CREATININE 2 55 (H) 02/24/2023    CREATININE 2 48 (H) 01/18/2023   · Estimated Creatinine Clearance: 19 8 mL/min (A) (by C-G formula based on SCr of 2 58 mg/dL (H))  · Ct near b/l 2 5    Insomnia  Assessment & Plan  · Pt says he takes Restoril 7 5 mg and Ambien 5 mg qhs although PDMP and EMR shows PCP wants pt to take Resotril 15 mg qhs prn    Since pt in supervised setting, will give Restoril qhs with option to take Ambien an hour later  · Pt to clarify his regimen w/ PCP    Liver transplant status ([facility] [address] )  Assessment & Plan  · C/w Prograf    Controlled diabetes mellitus with diabetic neuropathy, with long-term current use of insulin [facility])  Assessment & Plan  Lab Results   Component Value Date    HGBA1C 7 4 (A) 12/06/2022       Recent Labs     02/24/23  2123 02/25/23  0608 02/25/23  0637 02/25/23  0704   POCGLU 125 57* 189* 159*       Blood Sugar Average: Last 72 hrs:  (P) 132 5   · Lower dose of home Levemir  · ISS        VTE Pharmacologic Prophylaxis: VTE Score: 4 Moderate Risk (Score 3-4) - Pharmacological DVT Prophylaxis Contraindicated  Sequential Compression Devices Ordered  Patient Centered Rounds: I performed bedside rounds with nursing staff today  Discussions with Specialists or Other Care Team Provider: TT pulm, IR     Education and Discussions with Family / Patient: patient  Total Time Spent on Date of Encounter in care of patient: 35 minutes This time was spent on one or more of the following: performing physical exam; counseling and coordination of care; obtaining or reviewing history; documenting in the medical record; reviewing/ordering tests, medications or procedures; communicating with other healthcare professionals and discussing with patient's family/caregivers  Current Length of Stay: 0 day(s)  Current Patient Status: Observation   Certification Statement: The patient, admitted on an observation basis, will now require > 2 midnight hospital stay due to persistent R pneumothorax requiring pulm and IR evaluations  Discharge Plan: Anticipate discharge in 48-72 hrs to home  Code Status: Level 1 - Full Code    Subjective:   Patient reports he feels better, pain has resolved since being placed on O2  He denies feeling short of breath  Objective:     Vitals:   Temp (24hrs), Av 1 °F (36 7 °C), Min:98 1 °F (36 7 °C), Max:98 1 °F (36 7 °C)    Temp:  [98 1 °F (36 7 °C)] 98 1 °F (36 7 °C)  HR:  [66-84] 84  Resp:  [16-21] 18  BP: ()/(52-77) 139/75  SpO2:  [99 %-100 %] 99 %  There is no height or weight on file to calculate BMI  Input and Output Summary (last 24 hours): Intake/Output Summary (Last 24 hours) at 2023 1014  Last data filed at 2023 2136  Gross per 24 hour   Intake --   Output 200 ml   Net -200 ml       Physical Exam:   Physical Exam  Vitals reviewed  Constitutional:       General: He is not in acute distress  Appearance: He is not toxic-appearing  HENT:      Head: Normocephalic and atraumatic     Eyes:      Extraocular Movements: Extraocular movements intact  Cardiovascular:      Rate and Rhythm: Normal rate and regular rhythm  Pulmonary:      Effort: Pulmonary effort is normal  No respiratory distress  Comments: Decreased BS RUL  Abdominal:      General: Bowel sounds are normal  There is no distension  Palpations: Abdomen is soft  Tenderness: There is no abdominal tenderness  Musculoskeletal:         General: Normal range of motion  Cervical back: Normal range of motion  Neurological:      General: No focal deficit present  Mental Status: He is alert and oriented to person, place, and time  Cranial Nerves: No cranial nerve deficit  Psychiatric:         Mood and Affect: Mood normal          Behavior: Behavior normal          Thought Content:  Thought content normal          Additional Data:     Labs:  Results from last 7 days   Lab Units 02/25/23  0605 02/24/23  1839   WBC Thousand/uL 4 88 6 30   HEMOGLOBIN g/dL 8 4* 8 6*   HEMATOCRIT % 27 3* 27 3*   PLATELETS Thousands/uL 94* 105*   NEUTROS PCT %  --  71   LYMPHS PCT %  --  15   MONOS PCT %  --  8   EOS PCT %  --  5     Results from last 7 days   Lab Units 02/25/23  0605   SODIUM mmol/L 140   POTASSIUM mmol/L 4 3   CHLORIDE mmol/L 112*   CO2 mmol/L 23   BUN mg/dL 38*   CREATININE mg/dL 2 58*   ANION GAP mmol/L 5   CALCIUM mg/dL 8 8   GLUCOSE RANDOM mg/dL 54*     Results from last 7 days   Lab Units 02/20/23  0729   INR  1 02     Results from last 7 days   Lab Units 02/25/23  0704 02/25/23  0637 02/25/23  0608 02/24/23  2123   POC GLUCOSE mg/dl 159* 189* 57* 125               Lines/Drains:  Invasive Devices     Central Venous Catheter Line  Duration           Port A Cath 06/23/22 Right Chest 247 days          Drain  Duration           Ureteral Internal Stent Right ureter 6 Fr  37 days                Central Line:  Goal for removal: N/A - Chronic PICC             Imaging: Reviewed radiology reports from this admission including: chest xray and Personally reviewed the following imaging: chest xray with persistent RUL PTX    Recent Cultures (last 7 days):         Last 24 Hours Medication List:   Current Facility-Administered Medications   Medication Dose Route Frequency Provider Last Rate   • acetaminophen  975 mg Oral [facility] Mihaelanico Quevedo, DO     • clopidogrel  75 mg Oral Daily Mihaelanico Quevedo, DO     • insulin detemir  12 Units Subcutaneous HS Mihaela Quevedo, DO     • insulin lispro  1-5 Units Subcutaneous TID AC Mihaela Quevedo, DO     • insulin lispro  1-5 Units Subcutaneous HS Mihaela Quevedo, DO     • lidocaine  1 application Urethral Daily PRN Mihaela Quevedo, DO     • lidocaine  1 application Urethral Daily PRN Lucian Mann MD     • oxybutynin  5 mg Oral BID PRN Mihaela Quevedo, DO     • oxyCODONE  2 5 mg Oral Q4H PRN Mihaela Quevedo, DO     • tacrolimus  1 mg Oral Q12H Mihaelanico Quevedo, DO     • tamsulosin  0 4 mg Oral Daily With [facility], DO     • temazepam  7 5 mg Oral HS PRN Mihaela Quevedo, DO     • zolpidem  5 mg Oral HS PRN Mihaela Quevedo, DO          Today, Patient Was Seen By: Chung Mata PA-C    **Please Note: This note may have been constructed using a voice recognition system  **

## 2023-02-25 NOTE — ASSESSMENT & PLAN NOTE
Lab Results   Component Value Date    HGBA1C 7 4 (A) 12/06/2022       No results for input(s): POCGLU in the last 72 hours      Blood Sugar Average: Last 72 hrs:     · Lower dose of home Levemir  · ISS

## 2023-02-25 NOTE — UTILIZATION REVIEW
Initial Clinical Review    Admission: Date/Time/Statement:   Admission Orders (From admission, onward)     Ordered        02/25/23 1427  Inpatient Admission  Once            02/24/23 1940  Place in Observation  Once                    Placed in observation status on 2/24 changed to inpatient on 2/25 due to need for Chest tube for his  PTX  Orders Placed This Encounter   Procedures   • Inpatient Admission     Standing Status:   Standing     Number of Occurrences:   1     Order Specific Question:   Level of Care     Answer:   Med Surg [16]     Order Specific Question:   Estimated length of stay     Answer:   More than 2 Midnights     Order Specific Question:   Certification     Answer:   I certify that inpatient services are medically necessary for this patient for a duration of greater than two midnights  See H&P and MD Progress Notes for additional information about the patient's course of treatment  ED Arrival Information     Expected   -    Arrival   2/24/2023 15:56    Acuity   Emergent            Means of arrival   Ambulance    Escorted by   [facility] Stephan Hashimoto)    Service   Hospitalist    Admission type   Emergency            Arrival complaint   SOB           Chief Complaint   Patient presents with   • Shortness of Breath     Patient had a lung biopsy on Monday, started with sob and cough Tuesday  Initial Presentation: 74 y o  male  With a pmh of alcoholic cirrhosis s/p liver transplant, DM, CKD, CAD and bladder carcinoma and a RUL lung nodule s/p IR guided  bx on 2/20 who presented with severe pain and MUÑIZ  after the biopsy  He reports taking tylenol without relief, prompting ED evaluation  He received dilaudid in the Ed which helped with his pain  CXR showed  PTX, he was placed on 3L O2, Pain control with tylenol and oxycodone prn  He is admitted to observation status for  Supplemental O2, monitoring and repeat radiology studies, to monitor the Ptx       Date: 2/25/2023    Day 2:  He reports he feels better, pain has resolved since being placed on O2  ( 2 L  Sat 98%)   CXR showed moderate sized TR hydropneumothorax, IR consult for possible chest tube placement, Pulmonary consult     Interventional Radiology Consult- 2/25 -  Consulted for R hydropneumothorax  - s/p RUL biopsy, He presented with SOB & cough  Mod -sized R hydropneumothorax on chest radiograph  Plan R sided chest tube today  Interventional Radiology Procedure note - 2/25 - R anterior chest tube placement ( 8 F )       Pulmonary Consult - 2/25 -  Seen post chest tube placement, no air leak with forced exhalation   Continue on suction tonight, to water seal in the AM  CXR am 2/26 after switched to water seal      ED Triage Vitals   Temperature Pulse Respirations Blood Pressure SpO2   02/24/23 1610 02/24/23 1611 02/24/23 1611 02/24/23 1611 02/24/23 1611   98 1 °F (36 7 °C) 71 20 153/74 99 %      Temp Source Heart Rate Source Patient Position - Orthostatic VS BP Location FiO2 (%)   02/24/23 1610 02/24/23 1611 02/24/23 1611 02/24/23 1611 --   Oral Monitor Sitting Right arm       Pain Score       02/24/23 1847       7          Wt Readings from Last 1 Encounters:   02/20/23 55 8 kg (123 lb)     Additional Vital Signs:   Date/Time Temp Pulse Resp BP MAP (mmHg) SpO2 Calculated FIO2 (%) - Nasal Cannula Nasal Cannula O2 Flow Rate (L/min) O2 Device Patient Position - Orthostatic VS   02/25/23 1015 -- 74 19 -- -- 98 % 24 1 L/min Nasal cannula --   02/25/23 0800 -- 84 18 139/75 101 99 % 28 2 L/min Nasal cannula --   02/25/23 0730 -- 70 17 -- -- 100 % 28 2 L/min Nasal cannula --   02/25/23 0615 -- 72 19 111/58 78 99 % -- -- Nasal cannula --   02/25/23 0300 -- 66 16 95/52 67 99 % -- -- Nasal cannula --   02/24/23 2200 -- 70 21 127/70 92 99 % -- -- Nasal cannula Lying   02/24/23 2000 -- 74 20 148/77 103 99 % -- -- Nasal cannula Lying   02/24/23 1930 -- 76 21 -- -- 100 % -- -- Nasal cannula --   02/24/23 1847 -- 75 16 145/64 -- 100 % 28 2 L/min Nasal cannula Lying   02/24/23 1612 -- -- -- -- -- -- -- -- None (Room air) --   02/24/23 1611 -- 71 20 153/74 -- 99 % -- -- None (Room air) Sitting         Pertinent Labs/Diagnostic Test Results:   XR chest pa & lateral   Final Result by Milind Phillips MD (02/25 1000)      Moderate-sized right hydropneumothorax, little changed since 2/24/2023  Workstation performed: [id number]         XR chest 1 view portable   Final Result by Nelda Heimlich, MD (02/24 1636)   1  Tiny right apical pneumothorax after recent lung nodule biopsy  Mild soft tissue emphysema also noted right chest wall  2   Right apex nodule corresponding to recent biopsy target  The study was marked in [facility] for immediate notification        Workstation performed: [id number]         IR chest tube placement    (Results Pending)     ECG -2/24 - Normal sinus rhythm  Normal ECG  When compared with ECG of 06-OCT-2022 22:21,  No significant change was found          Results from last 7 days   Lab Units 02/25/23  0605 02/24/23  1839 02/20/23  0729   WBC Thousand/uL 4 88 6 30 4 64   HEMOGLOBIN g/dL 8 4* 8 6* 8 7*   HEMATOCRIT % 27 3* 27 3* 28 9*   PLATELETS Thousands/uL 94* 105* 88*   NEUTROS ABS Thousands/µL  --  4 47  --          Results from last 7 days   Lab Units 02/25/23  0605 02/24/23  1839   SODIUM mmol/L 140 137   POTASSIUM mmol/L 4 3 4 4   CHLORIDE mmol/L 112* 110*   CO2 mmol/L 23 22   ANION GAP mmol/L 5 5   BUN mg/dL 38* 38*   CREATININE mg/dL 2 58* 2 55*   EGFR ml/min/1 73sq m 23 23   CALCIUM mg/dL 8 8 8 8         Results from last 7 days   Lab Units 02/25/23  1125 02/25/23  0704 02/25/23  0637 02/25/23  0608 02/24/23 2123   POC GLUCOSE mg/dl 142* 159* 189* 57* 125     Results from last 7 days   Lab Units 02/25/23  0605 02/24/23  1839   GLUCOSE RANDOM mg/dL 54* 105       Results from last 7 days   Lab Units 02/24/23 2035 02/24/23  1839   HS TNI 0HR ng/L  --  5   HS TNI 2HR ng/L 6  --    HSTNI D2 ng/L 1  --          Results from last 7 days   Lab Units 02/20/23  0729   PROTIME seconds 13 6   INR  1 02       ED Treatment:   Medication Administration from 02/24/2023 1556 to 02/25/2023 1033       Date/Time Order Dose Route Action Comments     02/24/2023 1926 EST HYDROmorphone HCl (DILAUDID) injection 0 2 mg 0 2 mg Intravenous Given --     02/25/2023 0601 EST acetaminophen (TYLENOL) tablet 975 mg 975 mg Oral Given --     02/24/2023 2133 EST acetaminophen (TYLENOL) tablet 975 mg 975 mg Oral Given --     02/25/2023 0828 EST clopidogrel (PLAVIX) tablet 75 mg 75 mg Oral Given --     02/24/2023 2227 EST oxyCODONE (ROXICODONE) IR tablet 2 5 mg 2 5 mg Oral Given --     02/24/2023 2143 EST insulin detemir (LEVEMIR) subcutaneous injection 12 Units 12 Units Subcutaneous Given --     02/25/2023 0828 EST tacrolimus (PROGRAF) capsule 1 mg 1 mg Oral Given --     02/24/2023 2218 EST tacrolimus (PROGRAF) capsule 1 mg 1 mg Oral Given --     02/24/2023 2133 EST temazepam (RESTORIL) capsule 7 5 mg 7 5 mg Oral Given --     02/24/2023 2133 EST zolpidem (AMBIEN) tablet 5 mg 5 mg Oral Given --     02/25/2023 0221 EST LORazepam (ATIVAN) injection 2 mg 2 mg Intravenous Given --     02/25/2023 2207 EST dextrose 50 % IV solution 25 mL 25 mL Intravenous Given --        Past Medical History:   Diagnosis Date   • Acute urinary retention 09/12/2022   • Alcoholism ([facility] [address] )    • Anemia    • Bladder cancer ([facility] [address] )    • Cerebral artery aneurysm    • Change in mental state     last assessed 5/18/15; resolved 10/27/15   • Chronic kidney disease    • COVID-19 2022 not hospitalized fully recovered   • Diabetes mellitus ([facility] [address] )    • Drug dependence ([facility] [address] )    • Fatigue     last assessed 1/26/15; resolved 5/24/16   • Hepatitis C    • Hospital discharge follow-up 12/21/2018   • Hx of liver transplant [facility])    • Hydronephrosis of right kidney    • Kidney disease    • 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[facility] [address] )    • Liver cirrhosis ([facility] [address] )    • Liver transplant recipient [facility])    • Renal disorder    • Stroke (cerebrum) (HCC)    • Stroke (HCC)     diff short term memory   • Subdural hygroma     2/27/14; resolved 7/28/15   • Thrombocytopenia ([facility] [address] ) 09/20/2017     Present on Admission:  • Insomnia  • CKD (chronic kidney disease) stage 4, GFR 15-29 ml/min ([facility])      Admitting Diagnosis: SOB (shortness of breath) [R06 02]  Age/Sex: 74 y o  male       Admission Orders:  Scheduled Medications:  acetaminophen, 975 mg, Oral, Q8H [address]  clopidogrel, 75 mg, Oral, Daily  insulin detemir, 12 Units, Subcutaneous, HS  insulin lispro, 1-5 Units, Subcutaneous, TID AC  insulin lispro, 1-5 Units, Subcutaneous, HS  tacrolimus, 1 mg, Oral, Q12H  tamsulosin, 0 4 mg, Oral, Daily With Dinner      Continuous IV Infusions:     PRN Meds:  lidocaine, 1 application, Urethral, Daily PRN  lidocaine, 1 application, Urethral, Daily PRN  oxybutynin, 5 mg, Oral, BID PRN  oxyCODONE, 2 5 mg, Oral, Q4H PRN  temazepam, 7 5 mg, Oral, HS PRN  zolpidem, 5 mg, Oral, HS PRN        Network Utilization Review Department  ATTENTION: Please call with any questions or concerns to [phone] and carefully listen to the prompts so that you are directed to the right person  All voicemails are confidential   Austen Palomo all requests for admission clinical reviews, approved or denied determinations and any other requests to dedicated fax number below belonging to the campus where the patient is receiving treatment   List of dedicated fax numbers for the Facilities:  [address] DENIALS (Administrative/Medical Necessity) [phone]   [address] [place] [address] (Maternity/NICU/Pediatrics) [phone]   [address] [facility] [address]  [phone]   [address] [address] [address] [place] [phone]

## 2023-02-25 NOTE — CONSULTS
Consult Note - Pulmonary   Sumi Wallace 74 y o  male MRN: [id number]  Unit/Bed#: ED 17 Encounter: [id number]      Reason for consultation: post procedure pneumothorax    Requesting provider: Arie Nixon PA-C    Assessment & Recommendations:   Postprocedural pneumothorax  R-sided lung mass s/p biopsy  Bladder CA, currently undergoing chemotherapy  Cirrhosis s/p Liver transplant  CKD stage 4  History of CVA    S/p chest tube placement by IR  Will place CT to suction [id number]  Will check CXR in am  Not currently requiring oxygen supp  Maintain O2 sat > 90%  Encourage OOB to chair and IS use  Rest of plan as per primary    History of Present Illness   HPI:  Sumi Wallace is a 74 y o  male with PMH of bladder CA and cirrhosis s/p liver transplant who presents with SOB  Patient had a lung biopsy on 2/20 with IR due to lung mass that was seen on CT in the setting of bladder cancer  He developed Sob and chest pain and presented to the ED  CXR showed a right sided pneumothorax  IR was consulted for a chest tube  m we are consulted for management of the chest tube  He initially required oxygen supp but on my exam he was on room air with O2 sat > 90%  He states he feels less short of breath and wants to go home  Review of systems:  12 point review of systems was completed and was otherwise negative except as listed in HPI  Review of Systems   Constitutional: Negative for activity change, chills, diaphoresis, fatigue and fever  HENT: Negative for congestion, postnasal drip, rhinorrhea, sinus pressure, sinus pain and sore throat  Eyes: Negative  Respiratory: Negative for cough, chest tightness and shortness of breath  Cardiovascular: Negative for chest pain, palpitations and leg swelling  Gastrointestinal: Negative for abdominal distention, abdominal pain, constipation, diarrhea, nausea and vomiting  Endocrine: Negative  Genitourinary: Negative      Musculoskeletal: Negative for back pain, gait problem and neck pain    Skin: Negative  Allergic/Immunologic: Negative  Neurological: Negative for dizziness, syncope, weakness, light-headedness and headaches  Hematological: Negative  Psychiatric/Behavioral: Negative  All other systems reviewed and are negative          Historical Information   Past Medical History:   Diagnosis Date   • Acute urinary retention 09/12/2022   • Alcoholism ([facility] )    • Anemia    • Bladder cancer ([facility] )    • Cerebral artery aneurysm    • Change in mental state     last assessed 5/18/15; resolved 10/27/15   • Chronic kidney disease    • COVID-19 2022 not hospitalized fully recovered   • Diabetes mellitus ([facility] )    • Drug dependence ([place] [address] )    • Fatigue     last assessed 1/26/15; resolved 5/24/16   • Hepatitis C    • Hospital discharge follow-up 12/21/2018   • Hx of liver transplant [facility])    • Hydronephrosis of right kidney    • Kidney disease    • 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[facility] )    • Liver cirrhosis ([place] [address] )    • Liver transplant recipient [facility])    • Renal disorder    • Stroke (cerebrum) ([facility] )    • Stroke ([facility] )     diff short term memory   • Subdural hygroma     2/27/14; resolved 7/28/15   • Thrombocytopenia ([facility] ) 09/20/2017     Past Surgical History:   Procedure Laterality Date   • BRAIN SURGERY  02/12/2014    left frontotemporal 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   • CATARACT EXTRACTION     • CHOLECYSTECTOMY     • CYSTOSCOPY  11/30/2022    [place]   • FL LUMBAR PUNCTURE DIAGNOSTIC  12/14/2018   • FL RETROGRADE PYELOGRAM  1/18/2023   • HEPATITIS B SURFACE AB QN,LIVER TRANSPLANT (HISTORICAL)     • IR BIOPSY LUNG  2/20/2023   • IR NEPHROSTOMY TUBE CHECK/CHANGE/REPOSITION/REINSERTION/UPSIZE  07/29/2022   • IR NEPHROSTOMY TUBE CHECK/CHANGE/REPOSITION/REINSERTION/UPSIZE  08/31/2022   • IR NEPHROSTOMY TUBE CHECK/CHANGE/REPOSITION/REINSERTION/UPSIZE  10/07/2022   • IR NEPHROSTOMY TUBE CHECK/CHANGE/REPOSITION/REINSERTION/UPSIZE  12/3/2022   • IR NEPHROSTOMY TUBE PLACEMENT  05/28/2022   • IR PICC LINE  12/14/2018   • IR PORT PLACEMENT  06/23/2022   • LIVER TRANSPLANTATION     • NEPHROSTOMY TUBE CHANGE N/A 1/18/2023    Procedure: Removal of  percutaneous nephroureteral catheter;  Surgeon: Veronique Walters MD;  Location: [facility];  Service: Urology   • ND CYSTO BLADDER W/URETERAL CATHETERIZATION Right 1/18/2023    Procedure: URETHRAL DILATION, CYSTOGRAM, CYSTOSCOPY, RIGHT RETEROGRADE PYELOGRAM, [phone], COMPLEX CHICAS PLACEMENT;  Surgeon: Veronique Walters MD;  Location: [facility];  Service: Urology   • ROTATOR CUFF REPAIR     • SHOULDER SURGERY     • TRANSURETHRAL RESECTION OF BLADDER TUMOR N/A 05/26/2022    Procedure: TRANSURETHRAL RESECTION OF BLADDER TUMOR (TURBT);   Surgeon: Veronique Walters MD;  Location: [facility];  Service: Urology     Family History   Problem Relation Age of Onset   • Hypertension Mother         benign essential    • Lung cancer Father    • Diabetes Sister    • Cancer Brother    • Stroke Other         cva - due to embolism of cerebra artery        Occupational History: retired    Social History:   Alcohol: none   Smoking: smoked 1ppd for 30 years, quit in 7334  Illicit drugs: none  Home heating system: gas  Pets: none  Hobbies: watching tv    Meds/Allergies   Current Facility-Administered Medications   Medication Dose Route Frequency   • acetaminophen (TYLENOL) tablet 975 mg  975 mg Oral Q8H [facility]   • clopidogrel (PLAVIX) tablet 75 mg  75 mg Oral Daily   • fentanyl citrate (PF) 100 MCG/2ML   Intravenous PRN   • insulin detemir (LEVEMIR) subcutaneous injection 12 Units  12 Units Subcutaneous HS   • insulin lispro (HumaLOG) 100 units/mL subcutaneous injection 1-5 Units  1-5 Units Subcutaneous TID AC   • insulin lispro (HumaLOG) 100 units/mL subcutaneous injection 1-5 Units  1-5 Units Subcutaneous HS   • lidocaine (PF) (XYLOCAINE-MPF) 1 % injection    PRN   • lidocaine (URO-JET) 2 % urethral/mucosal gel 1 application  1 application Urethral Daily PRN   • lidocaine (URO-JET, GLYDO) 2 % urethral/mucosal gel 1 application  1 application Urethral Daily PRN   • oxybutynin (DITROPAN) tablet 5 mg  5 mg Oral BID PRN   • oxyCODONE (ROXICODONE) IR tablet 2 5 mg  2 5 mg Oral Q4H PRN   • tacrolimus (PROGRAF) capsule 1 mg  1 mg Oral Q12H   • tamsulosin (FLOMAX) capsule 0 4 mg  0 4 mg Oral Daily With Dinner   • temazepam (RESTORIL) capsule 7 5 mg  7 5 mg Oral HS PRN   • zolpidem (AMBIEN) tablet 5 mg  5 mg Oral HS PRN     (Not in a hospital admission)    Allergies   Allergen Reactions   • Tequin [Gatifloxacin] Rash   • Lipitor [Atorvastatin] Hives and Other (See Comments)     Rash and itching   • Ciprofloxacin Rash   • Iohexol Hives and Other (See Comments)     Contraindication with rosuvastatin  • Iv Contrast [Iodinated Contrast Media] Rash and Other (See Comments)     Unknown   • Levofloxacin Rash       Vitals: Blood pressure 129/66, pulse 75, temperature 98 1 °F (36 7 °C), temperature source Oral, resp  rate 18, SpO2 96 % , RA, There is no height or weight on file to calculate BMI        Intake/Output Summary (Last 24 hours) at 2/25/2023 1438  Last data filed at 2/24/2023 2136  Gross per 24 hour   Intake --   Output 200 ml   Net -200 ml       Physical Exam  General: well appearing, Awake alert and oriented x 3, conversant without conversational dyspnea, NAD, normal affect  HEENT:  PERRL, Sclera noninjected, nonicteric OU, Nares patent, no nasal flaring, no nasal drainage, Mucous membranes, moist, no oral lesions, normal dentition  NECK: Trachea midline, no accessory muscle use, no stridor, no cervical or supraclavicular adenopathy, JVP not elevated  CARDIAC: Reg, single s1/S2, no m/r/g  PULM: diminished breath sounds more notably on the right  CHEST: No gross deformities, equal chest expansion on inspiration bilaterally  ABD: Normoactive bowel sounds, soft nontender, nondistended, no rebound, no rigidity, no guarding  : none EXT: No cyanosis, no clubbing, no edema, normal capillary refill  SKIN:  No rashes, no lesions  NEURO: no focal neurologic deficits, AAOx3, moving all extremities appropriately    Labs: I have personally reviewed pertinent lab results  Laboratory and Diagnostics  Results from last 7 days   Lab Units 02/25/23  0605 02/24/23  1839 02/20/23  0729   WBC Thousand/uL 4 88 6 30 4 64   HEMOGLOBIN g/dL 8 4* 8 6* 8 7*   HEMATOCRIT % 27 3* 27 3* 28 9*   PLATELETS Thousands/uL 94* 105* 88*   NEUTROS PCT %  --  71  --    MONOS PCT %  --  8  --      Results from last 7 days   Lab Units 02/25/23  0605 02/24/23  1839   SODIUM mmol/L 140 137   POTASSIUM mmol/L 4 3 4 4   CHLORIDE mmol/L 112* 110*   CO2 mmol/L 23 22   ANION GAP mmol/L 5 5   BUN mg/dL 38* 38*   CREATININE mg/dL 2 58* 2 55*   CALCIUM mg/dL 8 8 8 8   GLUCOSE RANDOM mg/dL 54* 105          Results from last 7 days   Lab Units 02/20/23  0729   INR  1 02                                    ABG:       Imaging and other studies: I have personally reviewed pertinent films in PACS  CXR 2/24/23: stable R apical pneumothorax  CXR 2/25/23: R apical pneumothorax  Pulmonary function testing: n/a    EKG, Pathology, and Other Studies: I have personally reviewed pertinent reports      EKG 1/12/23: NSR    Code Status: Level 1 - Full Code    Jaycee Neal MD  Pulmonary/Critical Care Fellow PGY-IV  [facility]

## 2023-02-25 NOTE — ASSESSMENT & PLAN NOTE
· Pt w/ biopsy 2/20 pw pain  · CXR small PTX  · 3L O2  · AM CXR to check for stabilization  · Pain control w/ tylenol and oxycodone prn

## 2023-02-25 NOTE — ASSESSMENT & PLAN NOTE
· Pt says he takes Restoril 7 5 mg and Ambien 5 mg qhs although PDMP and EMR shows PCP wants pt to take Resotril 15 mg qhs prn    Since pt in supervised setting, will give Restoril qhs with option to take Ambien an hour later  · Pt to clarify his regimen w/ PCP

## 2023-02-25 NOTE — ASSESSMENT & PLAN NOTE
Lab Results   Component Value Date    EGFR 23 02/25/2023    EGFR 23 02/24/2023    EGFR 24 01/18/2023    CREATININE 2 58 (H) 02/25/2023    CREATININE 2 55 (H) 02/24/2023    CREATININE 2 48 (H) 01/18/2023   · Estimated Creatinine Clearance: 19 8 mL/min (A) (by C-G formula based on SCr of 2 58 mg/dL (H))    · Ct near b/l 2 5

## 2023-02-25 NOTE — TELEMEDICINE
e-Consult (IPC)  - Interventional Radiology  Zeenat Slade 74 y o  male MRN: [id number]  Unit/Bed#: ED 17 Encounter: [id number]          Interventional Radiology has been consulted to evaluate Zeenat Slade    We were consulted by hospital medicine concerning this patient with right hydropneumothorax  Inpatient Consult to IR  Consult performed by: Nixon Marte MD  Consult ordered by: Gerardo Lees PA-C        02/25/23    Assessment/Recommendation:   Status post right upper lobe lung biopsy on 2/20/2023    Presented with shortness of breath and cough  Moderate-sized right hydropneumothorax on chest radiograph  Plan to do right-sided chest tube today  5-10 minutes, >50% of the total time devoted to medical consultative verbal/EMR discussion between providers  Written report will be generated in the EMR  Thank you for allowing Interventional Radiology to participate in the care of Zeenat Slade  Please don't hesitate to call or TigerText us with any questions       Nixon Marte MD

## 2023-02-25 NOTE — ASSESSMENT & PLAN NOTE
Lab Results   Component Value Date    EGFR 23 02/24/2023    EGFR 24 01/18/2023    EGFR 33 12/04/2022    CREATININE 2 55 (H) 02/24/2023    CREATININE 2 48 (H) 01/18/2023    CREATININE 1 91 (H) 12/04/2022   · Estimated Creatinine Clearance: 20 1 mL/min (A) (by C-G formula based on SCr of 2 55 mg/dL (H))    · Ct near b/l 2 5

## 2023-02-25 NOTE — ASSESSMENT & PLAN NOTE
Lab Results   Component Value Date    HGBA1C 7 4 (A) 12/06/2022       Recent Labs     02/24/23  2123 02/25/23  0608 02/25/23  0637 02/25/23  0704   POCGLU 125 57* 189* 159*       Blood Sugar Average: Last 72 hrs:  (P) 132 5   · Lower dose of home Levemir  · ISS

## 2023-02-25 NOTE — H&P
[address] [facility]  H&P- Michel Merchant 1950, 74 y o  male MRN: [id number]  Unit/Bed#: ED 17 Encounter: [id number]  Primary Care Provider: Emmanuel Corley DO   Date and time admitted to hospital: 2/24/2023  3:58 PM    * Pneumothorax after biopsy  Assessment & Plan  · Pt w/ biopsy 2/20 pw pain  · CXR small PTX  · 3L O2  · AM CXR to check for stabilization  · Pain control w/ tylenol and oxycodone prn    History of CVA (cerebrovascular accident)  Assessment & Plan  · C/w Plavix  · Pt doesn ot tolerate Lipitor and Cresotr non formulary so hold statin as pt likely d/c tomorrow    CKD (chronic kidney disease) stage 4, GFR 15-29 ml/min [facility])  Assessment & Plan  Lab Results   Component Value Date    EGFR 23 02/24/2023    EGFR 24 01/18/2023    EGFR 33 12/04/2022    CREATININE 2 55 (H) 02/24/2023    CREATININE 2 48 (H) 01/18/2023    CREATININE 1 91 (H) 12/04/2022   · Estimated Creatinine Clearance: 20 1 mL/min (A) (by C-G formula based on SCr of 2 55 mg/dL (H))  · Ct near b/l 2 5    Insomnia  Assessment & Plan  · Pt says he takes Restoril 7 5 mg and Ambien 5 mg qhs although PDMP and EMR shows PCP wants pt to take Resotril 15 mg qhs prn  Since pt in supervised setting, will give Restoril qhs with option to take Ambien an hour later  · Pt to clarify his regimen w/ PCP    Liver transplant status ([facility] [address] )  Assessment & Plan  · C/w Prograf    Controlled diabetes mellitus with diabetic neuropathy, with long-term current use of insulin ([facility] [address] )  Assessment & Plan  Lab Results   Component Value Date    HGBA1C 7 4 (A) 12/06/2022       No results for input(s): POCGLU in the last 72 hours  Blood Sugar Average: Last 72 hrs:     · Lower dose of home Levemir  · ISS      VTE Pharmacologic Prophylaxis: VTE Score: 4 Moderate Risk (Score 3-4) - Pharmacological DVT Prophylaxis Contraindicated  Sequential Compression Devices Ordered    Code Status: Full  Discussion with family: Updated  (daughter) at bedside  Anticipated Length of Stay: Patient will be admitted on an observation basis with an anticipated length of stay of less than 2 midnights secondary to need to make sure PTX not growing  Total Time Spent on Date of Encounter in care of patient: 55 minutes This time was spent on one or more of the following: performing physical exam; counseling and coordination of care; obtaining or reviewing history; documenting in the medical record; reviewing/ordering tests, medications or procedures; communicating with other healthcare professionals and discussing with patient's family/caregivers  Chief Complaint: pain at lung biopsy site    History of Present Illness:  Sara Booth is a 74 y o  male with a PMH of lung nodule s/p bx 2/20 who presents with severe pain after the biopsy  Patient also with shortness of breath on exertion  Patient says he took Tylenol for the pain but that did not really help  He was given IV Dilaudid in emergency room which really helped with his pain  Patient denies any fevers, nausea, or vomiting  Review of Systems:  Review of Systems   Constitutional: Negative  HENT: Negative  Eyes: Negative  Respiratory: Positive for shortness of breath  Cardiovascular: Negative  Gastrointestinal: Negative  Endocrine: Negative  Genitourinary: Negative  Musculoskeletal:        Pain at biopsy site   Skin: Negative  Allergic/Immunologic: Negative  Neurological: Negative  Hematological: Negative  Psychiatric/Behavioral: Negative          Past Medical and Surgical History:   Past Medical History:   Diagnosis Date   • Acute urinary retention 09/12/2022   • Alcoholism ([facility] [address] )    • Anemia    • Bladder cancer ([facility] [address] )    • Cerebral artery aneurysm    • Change in mental state     last assessed 5/18/15; resolved 10/27/15   • Chronic kidney disease    • COVID-19 2022 not hospitalized fully recovered   • Diabetes mellitus ([facility] )    • Drug dependence ([facility] [address] )    • Fatigue last assessed 1/26/15; resolved 5/24/16   • Hepatitis C    • Hospital discharge follow-up 12/21/2018   • Hx of liver transplant [facility])    • Hydronephrosis of right kidney    • Kidney disease    • 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HCC)    • Liver cirrhosis ([facility] [address] )    • Liver transplant recipient [facility])    • Renal disorder    • Stroke (cerebrum) ([facility] [address] )    • Stroke ([facility] [address] )     diff short term memory   • Subdural hygroma     2/27/14; resolved 7/28/15   • Thrombocytopenia ([facility] [address] ) 09/20/2017       Past Surgical History:   Procedure Laterality Date   • BRAIN SURGERY  02/12/2014    left frontotemporal 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   • CATARACT EXTRACTION     • CHOLECYSTECTOMY     • CYSTOSCOPY  11/30/2022    [place]   • FL LUMBAR PUNCTURE DIAGNOSTIC  12/14/2018   • FL RETROGRADE PYELOGRAM  1/18/2023   • HEPATITIS B SURFACE AB QN,LIVER TRANSPLANT (HISTORICAL)     • IR BIOPSY LUNG  2/20/2023   • IR NEPHROSTOMY TUBE CHECK/CHANGE/REPOSITION/REINSERTION/UPSIZE  07/29/2022   • IR NEPHROSTOMY TUBE CHECK/CHANGE/REPOSITION/REINSERTION/UPSIZE  08/31/2022   • IR NEPHROSTOMY TUBE CHECK/CHANGE/REPOSITION/REINSERTION/UPSIZE  10/07/2022   • IR NEPHROSTOMY TUBE CHECK/CHANGE/REPOSITION/REINSERTION/UPSIZE  12/3/2022   • IR NEPHROSTOMY TUBE PLACEMENT  05/28/2022   • IR PICC LINE  12/14/2018   • IR PORT PLACEMENT  06/23/2022   • LIVER TRANSPLANTATION     • NEPHROSTOMY TUBE CHANGE N/A 1/18/2023    Procedure: Removal of  percutaneous nephroureteral catheter;  Surgeon: Bianca Garrison MD;  Location: [facility];  Service: Urology   • DE CYSTO BLADDER W/URETERAL CATHETERIZATION Right 1/18/2023    Procedure: URETHRAL DILATION, CYSTOGRAM, CYSTOSCOPY, RIGHT RETEROGRADE PYELOGRAM, [address] [facility], COMPLEX CHICAS PLACEMENT;  Surgeon: Bianca Garrison MD;  Location: [facility];  Service: Urology   • ROTATOR CUFF REPAIR     • SHOULDER SURGERY     • TRANSURETHRAL RESECTION OF BLADDER TUMOR N/A 05/26/2022 Procedure: TRANSURETHRAL RESECTION OF BLADDER TUMOR (TURBT); Surgeon: Samantha Villalba MD;  Location: [facility];  Service: Urology       Meds/Allergies:  Prior to Admission medications    Medication Sig Start Date End Date Taking?  Authorizing Provider   acetaminophen (TYLENOL) 325 mg tablet Take 3 tablets (975 mg total) by mouth every 8 (eight) hours 9/22/22   Mark Chin PA-C   Alcohol Swabs (B-D SINGLE USE SWABS REGULAR) PADS USE 2-3 TIMES DAILY AS NEEDED 11/14/22   Historical Provider, MD   Blood Glucose Calibration (OT ULTRA/FASTTK CNTRL SOLN) SOLN USE AS DIRECTED 12/20/22   Esthela Wade DO   clopidogrel (PLAVIX) 75 mg tablet Take 1 tablet (75 mg total) by mouth daily Do not start before January 23, 2023 1/23/23   Samantha Villalba MD   Comfort EZ Pen Needles 32G X 4 MM MISC USE 3-4 AS DIRECTED 5/17/22   Esthela Wade DO   Continuous Blood Gluc  (FreeStyle Frank 14 Day [place]) NATALIA Use 1 Device continuous 6/3/22   Esthela Wade, DO   Continuous Blood Gluc Sensor (FreeStyle Frank 14 Day Sensor) MISC Use 1 Device 4 (four) times a day 6/3/22   Esthela Wade DO   HYDROcodone-acetaminophen (Norco) 5-325 mg per tablet Take 1 tablet by mouth every 6 (six) hours as needed for pain Max Daily Amount: 4 tablets  Patient not taking: Reported on 1/25/2023 1/20/23   Miladis Elena PA-C   Incontinence Supply Disposable (Incontinence Brief Medium) MISC Use 4 (four) times a day 8/19/22 12/6/22  Andrews Parker MD   insulin detemir (Levemir FlexTouch) 100 Units/mL injection pen Inject 15 Units under the skin daily at bedtime 9/26/22   Esthela Wade DO   Lancet Devices (Lancing Device) MISC USE AS DIRECTED 12/20/22   Esthela Wade DO   loperamide (IMODIUM) 2 mg capsule Take 1 capsule (2 mg total) by mouth 2 (two) times a day as needed for diarrhea [facility] por cynthia si tiene diarrea 8/26/22   Billy Gomez, DO   Nutritional Supplements (Glucerna Shake) LIQD Take 1 Bottle by mouth 3 (three) times a day 22   Lester Bentley MD   OneTouch Ultra test strip TEST 3-4 TIMES A DAY 10/28/22   Arlen Colvin DO   oxybutynin (DITROPAN) 5 mg tablet Take 1 tablet (5 mg total) by mouth 2 (two) times a day as needed (bladder spasms) 22   Hetul Fregoso DO   rosuvastatin (CRESTOR) 40 MG tablet TAKE ONE (1) TABLET BY MOUTH DAILY 22   Arlen Colvin DO   sodium chloride  10/10/22   Historical Provider, MD   tacrolimus (PROGRAF) 1 mg capsule TAKE 1 CAPSULE (1 MG TOTAL) BY MOUTH EVERY 12 (TWELVE) HOURS 22   Arlen Colvin DO   tamsulosin (FLOMAX) 0 4 mg Take 1 capsule (0 4 mg total) by mouth daily with dinner 10/24/22 2/20/23  Viri Walker PA-C   temazepam (RESTORIL) 7 5 mg capsule TAKE 2 CAPSULES (15 MG TOTAL) BY MOUTH DAILY AT BEDTIME AS NEEDED FOR SLEEP 22   Arlen Colvin DO   zolpidem (AMBIEN) 5 mg tablet Take 5 mg by mouth daily at bedtime as needed 22   Historical Provider, MD     I have reviewed home medications with patient personally  Allergies: Allergies   Allergen Reactions   • Tequin [Gatifloxacin] Rash   • Lipitor [Atorvastatin] Hives and Other (See Comments)     Rash and itching   • Ciprofloxacin Rash   • Iohexol Hives and Other (See Comments)     Contraindication with rosuvastatin      • Iv Contrast [Iodinated Contrast Media] Rash and Other (See Comments)     Unknown   • Levofloxacin Rash       Social History:  Marital Status: Single     Patient Pre-hospital Living Situation: Home  Patient Pre-hospital Level of Mobility: walks    Substance Use History:   Social History     Substance and Sexual Activity   Alcohol Use Not Currently     Social History     Tobacco Use   Smoking Status Former   • Packs/day: 1 00   • Types: Cigarettes   • Quit date:    • Years since quittin    Smokeless Tobacco Never   Tobacco Comments    former smoker per allscripts      Social History     Substance and Sexual Activity   Drug Use No    Comment: remotely quit drug use per allscripts        Family History:  Family History   Problem Relation Age of Onset   • Hypertension Mother         benign essential    • Lung cancer Father    • Diabetes Sister    • Cancer Brother    • Stroke Other         cva - due to embolism of cerebra artery        Physical Exam:     Vitals:   Blood Pressure: 148/77 (02/24/23 2000)  Pulse: 74 (02/24/23 2000)  Temperature: 98 1 °F (36 7 °C) (02/24/23 1610)  Temp Source: Oral (02/24/23 1610)  Respirations: 20 (02/24/23 2000)  SpO2: 99 % (02/24/23 2000)    Physical Exam  HENT:      Head: Normocephalic and atraumatic  Nose: Nose normal       Mouth/Throat:      Mouth: Mucous membranes are moist    Eyes:      Extraocular Movements: Extraocular movements intact  Conjunctiva/sclera: Conjunctivae normal    Cardiovascular:      Rate and Rhythm: Normal rate and regular rhythm  Pulmonary:      Effort: Pulmonary effort is normal       Breath sounds: Normal breath sounds  Abdominal:      General: Bowel sounds are normal       Palpations: Abdomen is soft  Musculoskeletal:         General: Normal range of motion  Cervical back: Normal range of motion and neck supple  Right lower leg: No edema  Left lower leg: No edema  Skin:     General: Skin is warm and dry  Neurological:      Mental Status: He is alert and oriented to person, place, and time         Additional Data:     Lab Results:  Results from last 7 days   Lab Units 02/24/23  1839   WBC Thousand/uL 6 30   HEMOGLOBIN g/dL 8 6*   HEMATOCRIT % 27 3*   PLATELETS Thousands/uL 105*   NEUTROS PCT % 71   LYMPHS PCT % 15   MONOS PCT % 8   EOS PCT % 5     Results from last 7 days   Lab Units 02/24/23  1839   SODIUM mmol/L 137   POTASSIUM mmol/L 4 4   CHLORIDE mmol/L 110*   CO2 mmol/L 22   BUN mg/dL 38*   CREATININE mg/dL 2 55*   ANION GAP mmol/L 5   CALCIUM mg/dL 8 8   GLUCOSE RANDOM mg/dL 105     Results from last 7 days   Lab Units 02/20/23  0729   INR  1 02 Lines/Drains:  Invasive Devices     Central Venous Catheter Line  Duration           Port A Cath 06/23/22 Right Chest 246 days          Drain  Duration           Ureteral Internal Stent Right ureter 6 Fr  37 days                Central Line:  Goal for removal: Port accessed  Will de-access as appropriate  Imaging: Reviewed radiology reports from this admission including: chest xray  XR chest 1 view portable   Final Result by Victoriano Campos MD (02/24 6238)   1  Tiny right apical pneumothorax after recent lung nodule biopsy  Mild soft tissue emphysema also noted right chest wall  2   Right apex nodule corresponding to recent biopsy target  The study was marked in [facility] for immediate notification  Workstation performed: [id number]         XR chest pa & lateral    (Results Pending)       EKG and Other Studies Reviewed on Admission:   · EKG: NSR     ** Please Note: This note has been constructed using a voice recognition system   **

## 2023-02-25 NOTE — ASSESSMENT & PLAN NOTE
· Pt had biopsy of RUL 2/20 who presents with R sided chest pain  · CXR showed tiny R apical pneumothorax   · CXR this AM showed moderate-size R hydropneumothorax  · Consult IR for chest tube placement, consult pulmonology  · Currently on 2 L NC

## 2023-02-25 NOTE — BRIEF OP NOTE (RAD/CATH)
INTERVENTIONAL RADIOLOGY PROCEDURE NOTE    Date: 2/25/2023    Procedure:   Procedure Summary     Date:  Room / Location:     Anesthesia Start:  Anesthesia Stop:     Procedure:  Diagnosis:     Scheduled Providers:  Responsible Provider:     Anesthesia Type: Not recorded ASA Status: Not recorded          Preoperative diagnosis:   1  Pneumothorax after biopsy    2  Dysuria         Postoperative diagnosis: Same  Surgeon: Massimo Ferreira MD     Assistant: None  No qualified resident was available  Blood loss: Minimal    Specimens: none     Findings: right anterior chest tube placement (8F)    Complications: None immediate      Anesthesia: conscious sedation

## 2023-02-25 NOTE — ASSESSMENT & PLAN NOTE
· C/w Plavix  · Pt doesn ot tolerate Lipitor and Cresotr non formulary so hold statin as pt likely d/c tomorrow

## 2023-02-26 ENCOUNTER — APPOINTMENT (INPATIENT)
Dept: RADIOLOGY | Facility: HOSPITAL | Age: 73
DRG: 200 | End: 2023-02-26
Payer: COMMERCIAL

## 2023-02-26 LAB
GLUCOSE SERPL-MCNC: 121 MG/DL (ref 65–140)
GLUCOSE SERPL-MCNC: 144 MG/DL (ref 65–140)
GLUCOSE SERPL-MCNC: 162 MG/DL (ref 65–140)
GLUCOSE SERPL-MCNC: 80 MG/DL (ref 65–140)

## 2023-02-26 PROCEDURE — 99232 SBSQ HOSP IP/OBS MODERATE 35: CPT | Performed by: PHYSICIAN ASSISTANT

## 2023-02-26 PROCEDURE — 99232 SBSQ HOSP IP/OBS MODERATE 35: CPT | Performed by: INTERNAL MEDICINE

## 2023-02-26 PROCEDURE — 82948 REAGENT STRIP/BLOOD GLUCOSE: CPT

## 2023-02-26 PROCEDURE — 71045 X-RAY EXAM CHEST 1 VIEW: CPT

## 2023-02-26 RX ORDER — OXYCODONE HYDROCHLORIDE 5 MG/1
7.5 TABLET ORAL EVERY 6 HOURS PRN
Status: DISCONTINUED | OUTPATIENT
Start: 2023-02-26 | End: 2023-03-01 | Stop reason: HOSPADM

## 2023-02-26 RX ORDER — OXYCODONE HYDROCHLORIDE 5 MG/1
5 TABLET ORAL EVERY 6 HOURS PRN
Status: DISCONTINUED | OUTPATIENT
Start: 2023-02-26 | End: 2023-03-01 | Stop reason: HOSPADM

## 2023-02-26 RX ORDER — OXYCODONE HYDROCHLORIDE 5 MG/1
2.5 TABLET ORAL EVERY 4 HOURS PRN
Status: DISCONTINUED | OUTPATIENT
Start: 2023-02-26 | End: 2023-02-26

## 2023-02-26 RX ORDER — HYDROMORPHONE HCL IN WATER/PF 6 MG/30 ML
0.2 PATIENT CONTROLLED ANALGESIA SYRINGE INTRAVENOUS EVERY 4 HOURS PRN
Status: DISCONTINUED | OUTPATIENT
Start: 2023-02-26 | End: 2023-02-26

## 2023-02-26 RX ORDER — OXYCODONE HYDROCHLORIDE 5 MG/1
5 TABLET ORAL EVERY 4 HOURS PRN
Status: DISCONTINUED | OUTPATIENT
Start: 2023-02-26 | End: 2023-02-26

## 2023-02-26 RX ORDER — HYDROMORPHONE HCL/PF 1 MG/ML
0.5 SYRINGE (ML) INJECTION EVERY 4 HOURS PRN
Status: DISCONTINUED | OUTPATIENT
Start: 2023-02-26 | End: 2023-03-01 | Stop reason: HOSPADM

## 2023-02-26 RX ADMIN — OXYCODONE HYDROCHLORIDE 2.5 MG: 5 TABLET ORAL at 00:50

## 2023-02-26 RX ADMIN — MELATONIN 3 MG: at 21:09

## 2023-02-26 RX ADMIN — TAMSULOSIN HYDROCHLORIDE 0.4 MG: 0.4 CAPSULE ORAL at 16:57

## 2023-02-26 RX ADMIN — ACETAMINOPHEN 975 MG: 325 TABLET, FILM COATED ORAL at 21:09

## 2023-02-26 RX ADMIN — ZOLPIDEM TARTRATE 5 MG: 5 TABLET, COATED ORAL at 22:10

## 2023-02-26 RX ADMIN — HYDROMORPHONE HYDROCHLORIDE 0.2 MG: 0.2 INJECTION, SOLUTION INTRAMUSCULAR; INTRAVENOUS; SUBCUTANEOUS at 01:33

## 2023-02-26 RX ADMIN — CLOPIDOGREL BISULFATE 75 MG: 75 TABLET ORAL at 08:03

## 2023-02-26 RX ADMIN — TEMAZEPAM 7.5 MG: 7.5 CAPSULE ORAL at 21:11

## 2023-02-26 RX ADMIN — OXYBUTYNIN CHLORIDE 5 MG: 5 TABLET ORAL at 08:03

## 2023-02-26 RX ADMIN — OXYCODONE HYDROCHLORIDE 7.5 MG: 5 TABLET ORAL at 13:27

## 2023-02-26 RX ADMIN — INSULIN LISPRO 1 UNITS: 100 INJECTION, SOLUTION INTRAVENOUS; SUBCUTANEOUS at 16:55

## 2023-02-26 RX ADMIN — INSULIN DETEMIR 12 UNITS: 100 INJECTION, SOLUTION SUBCUTANEOUS at 21:09

## 2023-02-26 RX ADMIN — ACETAMINOPHEN 975 MG: 325 TABLET, FILM COATED ORAL at 13:27

## 2023-02-26 RX ADMIN — HYDROMORPHONE HYDROCHLORIDE 0.5 MG: 1 INJECTION, SOLUTION INTRAMUSCULAR; INTRAVENOUS; SUBCUTANEOUS at 18:30

## 2023-02-26 RX ADMIN — HYDROMORPHONE HYDROCHLORIDE 0.5 MG: 1 INJECTION, SOLUTION INTRAMUSCULAR; INTRAVENOUS; SUBCUTANEOUS at 09:16

## 2023-02-26 RX ADMIN — OXYCODONE HYDROCHLORIDE 7.5 MG: 5 TABLET ORAL at 23:18

## 2023-02-26 RX ADMIN — TACROLIMUS 1 MG: 1 CAPSULE ORAL at 21:10

## 2023-02-26 RX ADMIN — OXYCODONE HYDROCHLORIDE 5 MG: 5 TABLET ORAL at 08:01

## 2023-02-26 RX ADMIN — TACROLIMUS 1 MG: 1 CAPSULE ORAL at 08:02

## 2023-02-26 NOTE — UTILIZATION REVIEW
Continued Stay Review    Date: 2/26/2023   - 2/27/2023                        Current Patient Class:  Inpatient   Current Level of Care:  Med surg     HPI:72 y o  male initially admitted on 2/24 with pneumothorax  S/p lung biopsy    Assessment/Plan: 2/26/23  Post procedual pneumothorax  S/p Lg biopsy R side lung mass  Hx bladder Ca on chemo, cirrhosis s/p liver transplant  CKD stage 4  Placed to water seal without air leak, CXR this afternoon, if resolved , then remove CT tomorrow  Continue pain regimen, encourage ambulation and incentive spirometry use        2/27/2023  Pt remains on day 3, no acute events overnight  Continues with Chest tube currently  clamped   He continues to take pain meds for chest tube pain  Glucose to 59 this am  Bun/cr 41-2 80  Plan for follow up CXR this am, Following  2/20   RUL nodule biopsy results      Vital Signs:   Date/Time Temp Pulse Resp BP MAP (mmHg) SpO2 Calculated FIO2 (%) - Nasal Cannula Nasal Cannula O2 Flow Rate (L/min) O2 Device Patient Position - Orthostatic VS   02/27/23 0700 97 7 °F (36 5 °C) 77 20 132/80 91 99 % -- -- -- --   02/27/23 0312 98 5 °F (36 9 °C) 69 16 104/51 69 97 % -- -- None (Room air) Lying   02/26/23 2315 98 3 °F (36 8 °C) 77 16 120/71 87 97 % -- -- None (Room air) Lying   02/26/23 2100 -- -- -- -- -- 97 % -- -- None (Room air) --   02/26/23 1825 98 5 °F (36 9 °C) -- 16 121/72 -- -- -- -- -- Lying   02/26/23 1436 98 7 °F (37 1 °C) 77 17 121/68 86 97 % -- -- None (Room air) Lying     Date/Time Temp Pulse Resp BP MAP (mmHg) SpO2 Calculated FIO2 (%) - Nasal Cannula Nasal Cannula O2 Flow Rate (L/min) O2 Device Patient Position - Orthostatic VS   02/26/23 0900 -- -- -- -- -- 100 % -- -- None (Room air) --   02/25/23 22:45:12 98 3 °F (36 8 °C) 76 -- 122/69 87 96 % -- -- -- --   02/25/23 2007 -- -- -- -- -- -- -- -- None (Room air) --   02/25/23 18:00:12 -- 71 -- 150/77 101 97 % -- -- -- --   02/25/23 1800 -- 71 -- 150/77 101 -- -- -- None (Room air) -- 02/25/23 17:58:52 -- 74 -- 150/77 101 97 % -- -- -- --   02/25/23 1530 -- 74 22 124/74 94 98 % -- -- None (Room air) Lying   02/25/23 1500 -- 80  18  127/72 -- 99 %  -- -- None (Room air) Lying     Pertinent Labs/Diagnostic Results:      XR chest portable   Result Date: 2/26/2023  Impression: Right chest tube in place with markedly improved right pneumothorax   Findings marked for immediate notification in this inpatient       Results from last 7 days   Lab Units 02/25/23  0605 02/24/23  1839   WBC Thousand/uL 4 88 6 30   HEMOGLOBIN g/dL 8 4* 8 6*   HEMATOCRIT % 27 3* 27 3*   PLATELETS Thousands/uL 94* 105*   NEUTROS ABS Thousands/µL  --  4 47         Results from last 7 days   Lab Units 02/27/23 0441 02/25/23  0605 02/24/23  1839   SODIUM mmol/L 138 140 137   POTASSIUM mmol/L 4 3 4 3 4 4   CHLORIDE mmol/L 109* 112* 110*   CO2 mmol/L 24 23 22   ANION GAP mmol/L 5 5 5   BUN mg/dL 41* 38* 38*   CREATININE mg/dL 2 80* 2 58* 2 55*   EGFR ml/min/1 73sq m 21 23 23   CALCIUM mg/dL 8 7 8 8 8 8         Results from last 7 days   Lab Units 02/27/23  0531 02/26/23 2006 02/26/23  1527 02/26/23  1054 02/26/23  0541 02/25/23  2101 02/25/23  1711 02/25/23  1125 02/25/23  0704 02/25/23  0637 02/25/23  0608 02/24/23  2123   POC GLUCOSE mg/dl 59* 121 162* 144* 80 82 226* 142* 159* 189* 57* 125     Results from last 7 days   Lab Units 02/27/23  0441 02/25/23  0605 02/24/23  1839   GLUCOSE RANDOM mg/dL 58* 54* 105       Results from last 7 days   Lab Units 02/24/23 2035 02/24/23  1839   HS TNI 0HR ng/L  --  5   HS TNI 2HR ng/L 6  --    HSTNI D2 ng/L 1  --                Medications:   Scheduled Medications:  acetaminophen, 975 mg, Oral, Q8H [facility]  clopidogrel, 75 mg, Oral, Daily  HYDROmorphone, 0 2 mg, Intravenous, Once-2/24 x1     insulin detemir, 12 Units, Subcutaneous, HS  insulin lispro, 1-5 Units, Subcutaneous, TID AC  insulin lispro, 1-5 Units, Subcutaneous, HS  melatonin, 3 mg, Oral, HS  tacrolimus, 1 mg, Oral, Q12H  tamsulosin, 0 4 mg, Oral, Daily With Dinner  Ativan 2mg iv once 2/25 x1       Continuous IV Infusions:     PRN Meds:  HYDROmorphone, 0 5 mg, Intravenous, Q4H PRN-2/26 x 2 - 2/27 x 1    lidocaine, 1 application, Urethral, Daily PRN  oxybutynin, 5 mg, Oral, BID PRN-2/26 x 1  oxyCODONE, 5 mg, Oral, Q6H PRN - 2/26 x 1  oxyCODONE, 7 5 mg, Oral, Q6H PRN - 2/26 x 2   oxyCODONE 2 5mg , ORAL ,Q6H PRN - 2/25 x 1 - 2/26 x 1   temazepam, 7 5 mg, Oral, HS PRN  zolpidem, 5 mg, Oral, HS PRN        Discharge Plan:  TBD     Network Utilization Review Department  ATTENTION: Please call with any questions or concerns to [phone] and carefully listen to the prompts so that you are directed to the right person  All voicemails are confidential   Scarlett Timmons all requests for admission clinical reviews, approved or denied determinations and any other requests to dedicated fax number below belonging to the campus where the patient is receiving treatment   List of dedicated fax numbers for the Facilities:  [address] DENIALS (Administrative/Medical Necessity) [phone]   [address] (Maternity/NICU/Pediatrics) [phone]   [address] [phone]   [facility] 77 [phone]   [address] [place] [address] [facility] [place] [place] [address] [facility] [address] [place] [address] [phone]   [address] [facility] [address] [phone]

## 2023-02-26 NOTE — ASSESSMENT & PLAN NOTE
· Pt had biopsy of RUL 2/20 who presents with R sided chest pain  · CXR on admission showed tiny R apical pneumothorax   · Repeat CXR 2/25 moderate-size R hydropneumothorax  · S/p chest tube placement by IR on 2/25  · Weaned to room air  · Pulmonology following, appreciate input, repeat CXR 2/27 AM and possibly remove chest tube at that time if PTX resolved

## 2023-02-26 NOTE — PROGRESS NOTES
Progress Note - Pulmonary   [address] 74 y o  male MRN: [id number]  Unit/Bed#: [facility] 402-01 Encounter: [id number]      Assessment:  Postprocedural pneumothorax  R-sided lung mass s/p biopsy  Bladder CA, currently undergoing chemotherapy  Cirrhosis s/p Liver transplant  CKD stage 4  History of CVA    Plan:  Patient currently without air leak, placed to water seal this am  Will obtain CXR this afternoon  If remains without pneumothorax then can maintain water seal and repeat CXR in am  If pneumothorax is resolved, then remove CT tomorrow  Continue with pain regimen  Encourage ambulation and incentive spirometer use  Subjective:   Patient complains of chest pain at chest tube insertion site  Otherwise no SOB, fevers, chills, lightheadedness  Objective:       Vitals: Blood pressure 122/69, pulse 76, temperature 98 3 °F (36 8 °C), resp  rate 22, SpO2 96 % , RA, There is no height or weight on file to calculate BMI  Intake/Output Summary (Last 24 hours) at 2/26/2023 0846  Last data filed at 2/26/2023 0810  Gross per 24 hour   Intake 404 ml   Output 735 ml   Net -331 ml         Physical Exam  Physical Exam  Vitals and nursing note reviewed  Constitutional:       Comments: Thin, frail   HENT:      Head: Normocephalic and atraumatic  Right Ear: External ear normal       Left Ear: External ear normal       Nose: Nose normal       Mouth/Throat:      Mouth: Mucous membranes are moist       Pharynx: Oropharynx is clear  Eyes:      Conjunctiva/sclera: Conjunctivae normal    Cardiovascular:      Rate and Rhythm: Normal rate and regular rhythm  Pulses: Normal pulses  Heart sounds: Normal heart sounds  Pulmonary:      Effort: Pulmonary effort is normal       Comments: Diminished breath sounds; R anterior chest tube in place to pleurevac with serosanguinous drainage 100mL  Abdominal:      General: Abdomen is flat  Bowel sounds are normal       Palpations: Abdomen is soft     Musculoskeletal: General: No swelling or tenderness  Cervical back: Neck supple  No muscular tenderness  Skin:     General: Skin is warm and dry  Capillary Refill: Capillary refill takes less than 2 seconds  Neurological:      Mental Status: He is alert and oriented to person, place, and time  Mental status is at baseline  Psychiatric:         Mood and Affect: Mood normal          Behavior: Behavior normal          Thought Content: Thought content normal          Judgment: Judgment normal          Labs: I have personally reviewed pertinent lab results    Laboratory and Diagnostics  Results from last 7 days   Lab Units 02/25/23  0605 02/24/23  1839 02/20/23  0729   WBC Thousand/uL 4 88 6 30 4 64   HEMOGLOBIN g/dL 8 4* 8 6* 8 7*   HEMATOCRIT % 27 3* 27 3* 28 9*   PLATELETS Thousands/uL 94* 105* 88*   NEUTROS PCT %  --  71  --    MONOS PCT %  --  8  --      Results from last 7 days   Lab Units 02/25/23  0605 02/24/23  1839   SODIUM mmol/L 140 137   POTASSIUM mmol/L 4 3 4 4   CHLORIDE mmol/L 112* 110*   CO2 mmol/L 23 22   ANION GAP mmol/L 5 5   BUN mg/dL 38* 38*   CREATININE mg/dL 2 58* 2 55*   CALCIUM mg/dL 8 8 8 8   GLUCOSE RANDOM mg/dL 54* 105          Results from last 7 days   Lab Units 02/20/23  0729   INR  1 02                                    ABG:         Microbiology:  N/a     Imaging and other studies: I have personally reviewed pertinent films in PACS  CXR 2/25/23: R apical pneumothorax, stable from previous    Tiny MD Mark  Pulmonary/Critical Care Fellow PGY-IV  [facility]

## 2023-02-26 NOTE — PLAN OF CARE
Problem: PAIN - ADULT  Goal: Verbalizes/displays adequate comfort level or baseline comfort level  Description: Interventions:  - Encourage patient to monitor pain and request assistance  - Assess pain using appropriate pain scale  - Administer analgesics based on type and severity of pain and evaluate response  - Implement non-pharmacological measures as appropriate and evaluate response  - Consider cultural and social influences on pain and pain management  - Notify physician/advanced practitioner if interventions unsuccessful or patient reports new pain  Outcome: Progressing     Problem: INFECTION - ADULT  Goal: Absence or prevention of progression during hospitalization  Description: INTERVENTIONS:  - Assess and monitor for signs and symptoms of infection  - Monitor lab/diagnostic results  - Monitor all insertion sites, i e  indwelling lines, tubes, and drains  - Monitor endotracheal if appropriate and nasal secretions for changes in amount and color  - [place] appropriate cooling/warming therapies per order  - Administer medications as ordered  - Instruct and encourage patient and family to use good hand hygiene technique  - Identify and instruct in appropriate isolation precautions for identified infection/condition  Outcome: Progressing

## 2023-02-26 NOTE — PROGRESS NOTES
[facility]  Progress Note - Blairenzo Arellano 1950, 74 y o  male MRN: [id number]  Unit/Bed#: [facility] 402-01 Encounter: [id number]  Primary Care Provider: Elizabeth Romano DO   Date and time admitted to hospital: 2/24/2023  3:58 PM    * Pneumothorax after biopsy  Assessment & Plan  · Pt had biopsy of RUL 2/20 who presents with R sided chest pain  · CXR on admission showed tiny R apical pneumothorax   · Repeat CXR 2/25 moderate-size R hydropneumothorax  · S/p chest tube placement by IR on 2/25  · Weaned to room air  · Pulmonology following, appreciate input, repeat CXR 2/27 AM and possibly remove chest tube at that time if PTX resolved     History of CVA (cerebrovascular accident)  Assessment & Plan  · C/w Plavix  · Pt doesn ot tolerate Lipitor and Crestor non formulary, on hold for now, can resume at d/c     CKD (chronic kidney disease) stage 4, GFR 15-29 ml/min [facility])  Assessment & Plan  Lab Results   Component Value Date    EGFR 23 02/25/2023    EGFR 23 02/24/2023    EGFR 24 01/18/2023    CREATININE 2 58 (H) 02/25/2023    CREATININE 2 55 (H) 02/24/2023    CREATININE 2 48 (H) 01/18/2023   · Estimated Creatinine Clearance: 19 8 mL/min (A) (by C-G formula based on SCr of 2 58 mg/dL (H))  · Ct near b/l 2 5    Insomnia  Assessment & Plan  · Pt says he takes Restoril 7 5 mg and Ambien 5 mg qhs although PDMP and EMR shows PCP wants pt to take Resotril 15 mg qhs prn    Since pt in supervised setting, will give Restoril qhs with option to take Ambien an hour later  · Pt to clarify his regimen w/ PCP    Liver transplant status ([facility] [address] )  Assessment & Plan  · C/w Prograf    Controlled diabetes mellitus with diabetic neuropathy, with long-term current use of insulin [facility])  Assessment & Plan  Lab Results   Component Value Date    HGBA1C 7 4 (A) 12/06/2022       Recent Labs     02/25/23  1125 02/25/23  1711 02/25/23 2101 02/26/23  0541   POCGLU 142* 226* 82 80       Blood Sugar Average: Last 72 hrs:  (P) 132 5   · Lower dose of home Levemir  · ISS        VTE Pharmacologic Prophylaxis: VTE Score: 4 Moderate Risk (Score 3-4) - Pharmacological DVT Prophylaxis Contraindicated  Sequential Compression Devices Ordered  Patient Centered Rounds: I performed bedside rounds with nursing staff today  Discussions with Specialists or Other Care Team Provider:     Education and Discussions with Family / Patient: Updated  (wife) via phone  Total Time Spent on Date of Encounter in care of patient: 25 minutes This time was spent on one or more of the following: performing physical exam; counseling and coordination of care; obtaining or reviewing history; documenting in the medical record; reviewing/ordering tests, medications or procedures; communicating with other healthcare professionals and discussing with patient's family/caregivers  Current Length of Stay: 1 day(s)  Current Patient Status: Inpatient   Certification Statement: The patient will continue to require additional inpatient hospital stay due to pneumothorax with chest tube placement  Discharge Plan: Anticipate discharge in 24-48 hrs to home  Code Status: Level 1 - Full Code    Subjective:   Patient reports he has pain at chest tube site  Denies sob  Poor appetite  No other complaints  Objective:     Vitals:   Temp (24hrs), Av 3 °F (36 8 °C), Min:98 3 °F (36 8 °C), Max:98 3 °F (36 8 °C)    Temp:  [98 3 °F (36 8 °C)] 98 3 °F (36 8 °C)  HR:  [71-86] 76  Resp:  [18-29] 22  BP: (111-150)/(60-77) 122/69  SpO2:  [96 %-100 %] 96 %  There is no height or weight on file to calculate BMI  Input and Output Summary (last 24 hours): Intake/Output Summary (Last 24 hours) at 2023 0923  Last data filed at 2023 0810  Gross per 24 hour   Intake 404 ml   Output 735 ml   Net -331 ml       Physical Exam:   Physical Exam  Vitals reviewed  Constitutional:       General: He is not in acute distress       Appearance: He is not toxic-appearing  HENT:      Head: Normocephalic and atraumatic  Eyes:      Extraocular Movements: Extraocular movements intact  Cardiovascular:      Rate and Rhythm: Normal rate and regular rhythm  Pulmonary:      Effort: Pulmonary effort is normal  No respiratory distress  Abdominal:      General: Bowel sounds are normal  There is no distension  Palpations: Abdomen is soft  Tenderness: There is no abdominal tenderness  Musculoskeletal:         General: Normal range of motion  Cervical back: Normal range of motion  Neurological:      General: No focal deficit present  Mental Status: He is alert and oriented to person, place, and time  Psychiatric:         Mood and Affect: Mood normal          Behavior: Behavior normal          Thought Content:  Thought content normal          Additional Data:     Labs:  Results from last 7 days   Lab Units 02/25/23  0605 02/24/23  1839   WBC Thousand/uL 4 88 6 30   HEMOGLOBIN g/dL 8 4* 8 6*   HEMATOCRIT % 27 3* 27 3*   PLATELETS Thousands/uL 94* 105*   NEUTROS PCT %  --  71   LYMPHS PCT %  --  15   MONOS PCT %  --  8   EOS PCT %  --  5     Results from last 7 days   Lab Units 02/25/23  0605   SODIUM mmol/L 140   POTASSIUM mmol/L 4 3   CHLORIDE mmol/L 112*   CO2 mmol/L 23   BUN mg/dL 38*   CREATININE mg/dL 2 58*   ANION GAP mmol/L 5   CALCIUM mg/dL 8 8   GLUCOSE RANDOM mg/dL 54*     Results from last 7 days   Lab Units 02/20/23  0729   INR  1 02     Results from last 7 days   Lab Units 02/26/23  0541 02/25/23  2101 02/25/23  1711 02/25/23  1125 02/25/23  0704 02/25/23  0637 02/25/23  0608 02/24/23  2123   POC GLUCOSE mg/dl 80 82 226* 142* 159* 189* 57* 125               Lines/Drains:  Invasive Devices     Central Venous Catheter Line  Duration           Port A Cath 06/23/22 Right Chest 247 days          Drain  Duration           Ureteral Internal Stent Right ureter 6 Fr  38 days    Chest Tube Right Pleural 8 Fr  <1 day                Central Line:  Goal for removal: N/A - Chronic PICC             Imaging: No pertinent imaging reviewed  Recent Cultures (last 7 days):         Last 24 Hours Medication List:   Current Facility-Administered Medications   Medication Dose Route Frequency Provider Last Rate   • acetaminophen  975 mg Oral [facility] Angeline Alpers, DO     • clopidogrel  75 mg Oral Daily Angeline Alpers, DO     • HYDROmorphone  0 5 mg Intravenous Once Lien Villegas MD     • HYDROmorphone  0 5 mg Intravenous Q4H PRN Merle Boeck, MD     • insulin detemir  12 Units Subcutaneous HS Angeline Alpers, DO     • insulin lispro  1-5 Units Subcutaneous TID AC Angeline Alpers, DO     • insulin lispro  1-5 Units Subcutaneous HS Angeline Alpers, DO     • lidocaine  1 application Urethral Daily PRN Angeline Alpers, DO     • melatonin  3 mg Oral HS YAMILKA Moya     • oxybutynin  5 mg Oral BID PRN Angeline Alpers, DO     • oxyCODONE  5 mg Oral Q6H PRN Merle Boeck, MD     • oxyCODONE  7 5 mg Oral Q6H PRN Merle Boeck, MD     • tacrolimus  1 mg Oral Q12H Angeline Alpers, DO     • tamsulosin  0 4 mg Oral Daily With [facility], DO     • temazepam  7 5 mg Oral HS PRN Angeline Alpers, DO     • zolpidem  5 mg Oral HS PRN Angeline Alpers, DO          Today, Patient Was Seen By: Arie Nixon PA-C    **Please Note: This note may have been constructed using a voice recognition system  **

## 2023-02-26 NOTE — ASSESSMENT & PLAN NOTE
Lab Results   Component Value Date    HGBA1C 7 4 (A) 12/06/2022       Recent Labs     02/25/23  1125 02/25/23  1711 02/25/23  2101 02/26/23  0541   POCGLU 142* 226* 82 80       Blood Sugar Average: Last 72 hrs:  (P) 132 5   · Lower dose of home Levemir  · ISS

## 2023-02-27 ENCOUNTER — APPOINTMENT (INPATIENT)
Dept: RADIOLOGY | Facility: HOSPITAL | Age: 73
DRG: 200 | End: 2023-02-27
Attending: INTERNAL MEDICINE
Payer: COMMERCIAL

## 2023-02-27 ENCOUNTER — APPOINTMENT (INPATIENT)
Dept: RADIOLOGY | Facility: HOSPITAL | Age: 73
DRG: 200 | End: 2023-02-27
Payer: COMMERCIAL

## 2023-02-27 LAB
ANION GAP SERPL CALCULATED.3IONS-SCNC: 5 MMOL/L (ref 4–13)
ANION GAP SERPL CALCULATED.3IONS-SCNC: 5 MMOL/L (ref 4–13)
BUN SERPL-MCNC: 39 MG/DL (ref 5–25)
BUN SERPL-MCNC: 41 MG/DL (ref 5–25)
CALCIUM SERPL-MCNC: 8.6 MG/DL (ref 8.3–10.1)
CALCIUM SERPL-MCNC: 8.7 MG/DL (ref 8.3–10.1)
CHLORIDE SERPL-SCNC: 108 MMOL/L (ref 96–108)
CHLORIDE SERPL-SCNC: 109 MMOL/L (ref 96–108)
CO2 SERPL-SCNC: 23 MMOL/L (ref 21–32)
CO2 SERPL-SCNC: 24 MMOL/L (ref 21–32)
CREAT SERPL-MCNC: 2.63 MG/DL (ref 0.6–1.3)
CREAT SERPL-MCNC: 2.8 MG/DL (ref 0.6–1.3)
GFR SERPL CREATININE-BSD FRML MDRD: 21 ML/MIN/1.73SQ M
GFR SERPL CREATININE-BSD FRML MDRD: 22 ML/MIN/1.73SQ M
GLUCOSE SERPL-MCNC: 119 MG/DL (ref 65–140)
GLUCOSE SERPL-MCNC: 153 MG/DL (ref 65–140)
GLUCOSE SERPL-MCNC: 58 MG/DL (ref 65–140)
GLUCOSE SERPL-MCNC: 59 MG/DL (ref 65–140)
GLUCOSE SERPL-MCNC: 89 MG/DL (ref 65–140)
GLUCOSE SERPL-MCNC: 89 MG/DL (ref 65–140)
GLUCOSE SERPL-MCNC: 99 MG/DL (ref 65–140)
POTASSIUM SERPL-SCNC: 4.3 MMOL/L (ref 3.5–5.3)
POTASSIUM SERPL-SCNC: 4.4 MMOL/L (ref 3.5–5.3)
SODIUM SERPL-SCNC: 136 MMOL/L (ref 135–147)
SODIUM SERPL-SCNC: 138 MMOL/L (ref 135–147)

## 2023-02-27 PROCEDURE — 71046 X-RAY EXAM CHEST 2 VIEWS: CPT

## 2023-02-27 PROCEDURE — 80048 BASIC METABOLIC PNL TOTAL CA: CPT | Performed by: PHYSICIAN ASSISTANT

## 2023-02-27 PROCEDURE — 82948 REAGENT STRIP/BLOOD GLUCOSE: CPT

## 2023-02-27 PROCEDURE — 99232 SBSQ HOSP IP/OBS MODERATE 35: CPT | Performed by: INTERNAL MEDICINE

## 2023-02-27 PROCEDURE — 71045 X-RAY EXAM CHEST 1 VIEW: CPT

## 2023-02-27 PROCEDURE — 99232 SBSQ HOSP IP/OBS MODERATE 35: CPT | Performed by: PHYSICIAN ASSISTANT

## 2023-02-27 RX ORDER — SODIUM CHLORIDE 9 MG/ML
75 INJECTION, SOLUTION INTRAVENOUS CONTINUOUS
Status: DISPENSED | OUTPATIENT
Start: 2023-02-27 | End: 2023-02-27

## 2023-02-27 RX ADMIN — TAMSULOSIN HYDROCHLORIDE 0.4 MG: 0.4 CAPSULE ORAL at 17:35

## 2023-02-27 RX ADMIN — HYDROMORPHONE HYDROCHLORIDE 0.5 MG: 1 INJECTION, SOLUTION INTRAMUSCULAR; INTRAVENOUS; SUBCUTANEOUS at 16:12

## 2023-02-27 RX ADMIN — TACROLIMUS 1 MG: 1 CAPSULE ORAL at 20:38

## 2023-02-27 RX ADMIN — OXYCODONE HYDROCHLORIDE 5 MG: 5 TABLET ORAL at 11:40

## 2023-02-27 RX ADMIN — ACETAMINOPHEN 975 MG: 325 TABLET, FILM COATED ORAL at 21:04

## 2023-02-27 RX ADMIN — ACETAMINOPHEN 975 MG: 325 TABLET, FILM COATED ORAL at 13:15

## 2023-02-27 RX ADMIN — INSULIN LISPRO 1 UNITS: 100 INJECTION, SOLUTION INTRAVENOUS; SUBCUTANEOUS at 11:33

## 2023-02-27 RX ADMIN — ACETAMINOPHEN 975 MG: 325 TABLET, FILM COATED ORAL at 05:33

## 2023-02-27 RX ADMIN — SODIUM CHLORIDE 75 ML/HR: 0.9 INJECTION, SOLUTION INTRAVENOUS at 09:53

## 2023-02-27 RX ADMIN — MELATONIN 3 MG: at 21:04

## 2023-02-27 RX ADMIN — HYDROMORPHONE HYDROCHLORIDE 0.5 MG: 1 INJECTION, SOLUTION INTRAMUSCULAR; INTRAVENOUS; SUBCUTANEOUS at 01:46

## 2023-02-27 RX ADMIN — TACROLIMUS 1 MG: 1 CAPSULE ORAL at 08:40

## 2023-02-27 RX ADMIN — OXYCODONE HYDROCHLORIDE 5 MG: 5 TABLET ORAL at 17:35

## 2023-02-27 RX ADMIN — CLOPIDOGREL BISULFATE 75 MG: 75 TABLET ORAL at 08:40

## 2023-02-27 NOTE — CASE MANAGEMENT
Case Management Assessment & Discharge Planning Note    Patient name Bozena Xavier  Location [address] MRN [id number]  : 1950 Date 2023       Current Admission Date: 2023  Current Admission Diagnosis:Pneumothorax after biopsy   Patient Active Problem List    Diagnosis Date Noted   • Pneumothorax after biopsy    • Metabolic acidosis    • Palliative care encounter 2022   • Solitary pulmonary nodule 10/20/2022   • Hip pain, acute, right 10/17/2022   • Dementia ([place] [address] ) 10/12/2022   • Pulmonary nodule 10/08/2022   • History of bladder cancer 10/08/2022   • History of liver transplant ([place] [address] ) 10/08/2022   • DM II (diabetes mellitus, type II), controlled ([place] [address] ) 10/08/2022   • Anemia 10/08/2022   • Insomnia 10/08/2022   • History of hydronephrosis 10/08/2022   • Orthostatic hypotension 2022   • Right ankle pain 2022   • Orthostatic hypotension 2022   • Hydronephrosis 2022   • Multiple falls 2022   • Anemia due to chronic kidney disease 2022   • History of bladder cancer 2022   • Controlled type 2 diabetes mellitus with diabetic neuropathy, with long-term current use of insulin ([place] [address] ) 2022   • History of CVA (cerebrovascular accident) 2022   • History of COVID-19 2022   • Hospital discharge follow-up 2022   • Anemia 2022   • Mixed stress and urge urinary incontinence 2022   • Weight loss 2022   • Urethritis 2022   • Port-A-Cath in place 2022   • Malignant neoplasm of trigone of urinary bladder ([facility] ) 06/15/2022   • CKD (chronic kidney disease) 2022   • Generalized weakness 06/10/2022   • Nephrostomy status ([place] [address] ) 06/10/2022   • Bladder spasms 06/10/2022   • Bladder tumor 2022   • Gross hematuria 2022   • Hydronephrosis of right kidney 2022   • Mild cognitive impairment 2021   • Subdural hygroma    • 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HCC)    • CKD (chronic kidney disease) stage 4, GFR 15-29 ml/min (HCC) 03/30/2021   • Hand lesion 06/23/2020   • Type 2 diabetes mellitus with diabetic peripheral angiopathy without gangrene, with long-term current use of insulin ([facility] ) 06/13/2019   • Rash 09/11/2018   • Medicare annual wellness visit, subsequent 06/07/2018   • Screening for colon cancer 06/07/2018   • Hepatitis B core antibody positive 09/25/2017   • Thrombocytopenia ([facility] ) 09/20/2017   • History of CVA (cerebrovascular accident) 09/18/2017   • Controlled diabetes mellitus with diabetic neuropathy, with long-term current use of insulin ([place] [address] ) 09/18/2017   • Liver transplant status ([place] [address] ) 09/18/2017   • History of immunosuppression therapy 09/18/2017   • History of hepatitis C 09/18/2017   • Pruritus 08/03/2016   • Spondylosis of cervical region without myelopathy or radiculopathy 02/02/2016   • Headache, chronic daily 09/21/2015   • Vitamin D deficiency 12/16/2014   • Occipital neuralgia 04/08/2014   • Migraine headache 03/31/2014   • BPPV (benign paroxysmal positional vertigo), unspecified laterality 12/31/2013   • Cerebral arterial aneurysm 12/02/2013   • Prurigo nodularis 10/15/2013   • 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   • Erectile dysfunction of non-organic origin 09/11/2013   • History of cirrhosis 07/09/2012   • Insomnia 05/30/2012   • Peripheral neuropathy 05/07/2012      LOS (days): 2  Geometric Mean LOS (GMLOS) (days):   Days to GMLOS:     OBJECTIVE:    Risk of Unplanned Readmission Score: 43 09         Current admission status: Inpatient       Preferred Pharmacy:   [address] [place] [address]  [address] [facility] [address]  Phone: [phone] Fax: [address] [place] [address] [facility] [place] [address] [place] [address] [place] [address]  Phone: [phone] Fax: [phone]    PATIENT/FAMILY REPORTS NO PREFERRED PHARMACY  No address on file      [facility] [place] [address] ,  [address] ,  [address] [facility] [address]  Phone: [phone] Fax: [phone]    Primary Care Provider: Eloy Gao DO    Primary Insurance: Terri Drew [facility]  Secondary Insurance:     ASSESSMENT:  [address] Proxies    There are no active Health Care Proxies on file  Advance Directives  Does patient have Advance Directives?: No  Was patient offered paperwork?: Yes (declined)  Primary Contact: sig  other [address] [place] [address]         Readmission Root Cause  30 Day Readmission: No    Patient Information  Admitted from[de-identified] Home  Mental Status: Alert  During Assessment patient was accompanied by: Not accompanied during assessment  Assessment information provided by[de-identified] Patient  Primary Caregiver: Self  Support Systems: Spouse/significant other, Daughter, Family members  [place] of Residence: [employer] [facility] do you live in?: [address] [facility] entry access options   Select all that apply : Stairs  Number of steps to enter home : 5  Do the steps have railings?: Yes  Type of Current Residence: [address] [place] home  Upon entering residence, is there a bedroom on the main floor (no further steps)?: Yes  A bedroom is located on the following floor levels of residence (select all that apply):: 2nd Floor  Upon entering residence, is there a bathroom on the main floor (no further steps)?: Yes  Indicate which floors of current residence have a bathroom (select all the apply):: 2nd Floor  Number of steps to 2nd floor from main floor: One Flight  In the last 12 months, was there a time when you were not able to pay the mortgage or rent on time?: No  In the last 12 months, how many places have you lived?: 1  In the last 12 months, was there a time when you did not have a steady place to sleep or slept in a shelter (including now)?: No  Homeless/housing insecurity resource given?: N/A  Living Arrangements: Lives w/ Spouse/significant other, Lives w/ Daughter  Is patient a ?: No    Activities of Daily Living Prior to Admission  Functional Status: Independent  Completes ADLs independently?: Yes  Ambulates independently?: Yes  Does patient use assisted devices?: Yes  Assisted Devices (DME) used: Straight Cane  Does patient currently own DME?: Yes  What DME does the patient currently own?: Straight Cane  Does patient have a history of Outpatient Therapy (PT/OT)?: No  Does the patient have a history of Short-Term Rehab?: No  Does patient have a history of HHC?: Yes ([facility])  Does patient currently have [address]?: No         Patient Information Continued  Income Source: Pension/shelter  Does patient have prescription coverage?: Yes  Within the past 12 months, you worried that your food would run out before you got the money to buy more : Never true  Within the past 12 months, the food you bought just didn't last and you didn't have money to get more : Never true  Food insecurity resource given?: N/A  Does patient receive dialysis treatments?: No  Does patient have a history of substance abuse?: No  Does patient have a history of Mental Health Diagnosis?: No         Means of Transportation  Means of Transport to Appts[de-identified] Family transport  In the past 12 months, has lack of transportation kept you from medical appointments or from getting medications?: No  In the past 12 months, has lack of transportation kept you from meetings, work, or from getting things needed for daily living?: No        DISCHARGE DETAILS:    Discharge planning discussed with[de-identified] patient--no [address] needs  Freedom of Choice: Yes     CM contacted family/caregiver?: No- see comments (alert and oriented)  Were Treatment Team discharge recommendations reviewed with patient/caregiver?: Yes  Did patient/caregiver verbalize understanding of patient care needs?: Yes  Were patient/caregiver advised of the risks associated with not following Treatment Team discharge recommendations?: Yes    Contacts  Patient Contacts: sig  other Benitez Gray  Relationship to Patient[de-identified] Family  Contact Method: Phone  Phone Number: home [phone] or cell [phone]  Reason/Outcome: Continuity of Care, Emergency Contact, Discharge [address]         Is the patient interested in [facility] at discharge?: No    DME Referral Provided  Referral made for DME?: No    Other Referral/Resources/Interventions Provided:  Referral Comments: Patient

## 2023-02-27 NOTE — RESTORATIVE TECHNICIAN NOTE
Restorative Technician Note      Patient Name: Jaylan Tyler     Restorative Tech Visit Date: 02/27/23  Note Type: Mobility  Patient Position Upon Consult: Supine  Activity Performed: Ambulated  Assistive Device: Other (Comment) (pushed IV pole)  Patient Position at End of Consult: All needs within reach;  Supine

## 2023-02-27 NOTE — PROGRESS NOTES
[address] [facility]  Progress Note - Clif Kramer 1950, 74 y o  male MRN: [id number]  Unit/Bed#: [facility] 402-01 Encounter: [id number]  Primary Care Provider: Esthela Wade DO   Date and time admitted to hospital: 2/24/2023  3:58 PM    * Pneumothorax after biopsy  Assessment & Plan  · Pt had biopsy of RUL 2/20 who presents with R sided chest pain  · CXR on admission showed tiny R apical pneumothorax   · Repeat CXR 2/25 moderate-size R hydropneumothorax  · S/p chest tube placement by IR on 2/25  · Weaned to room air  · Pulmonology following, appreciate input, repeat CXR 2/27 AM and possibly remove chest tube at that time if PTX resolved     History of CVA (cerebrovascular accident)  Assessment & Plan  · C/w Plavix  · Pt does not tolerate Lipitor and Crestor non formulary, on hold for now, can resume at d/c     CKD (chronic kidney disease) stage 4, GFR 15-29 ml/min [facility])  Assessment & Plan  Lab Results   Component Value Date    EGFR 21 02/27/2023    EGFR 23 02/25/2023    EGFR 23 02/24/2023    CREATININE 2 80 (H) 02/27/2023    CREATININE 2 58 (H) 02/25/2023    CREATININE 2 55 (H) 02/24/2023   · Estimated Creatinine Clearance: 17 6 mL/min (A) (by C-G formula based on SCr of 2 8 mg/dL (H))  · Cr b/l 2 5  · Today Cr bumped to 2 8, pt has CARLOTTA  Notes poor PO intake will give gentle IV fluids today and repeat Cr this afternoon, if improved likely ok for d/c as long as chest tube can come out  Insomnia  Assessment & Plan  · Pt says he takes Restoril 7 5 mg and Ambien 5 mg qhs although PDMP and EMR shows PCP wants pt to take Resotril 15 mg qhs prn    Since pt in supervised setting, will give Restoril qhs with option to take Ambien an hour later  · Pt to clarify his regimen w/ PCP    Liver transplant status ([facility] [address] )  Assessment & Plan  · C/w Prograf    Controlled diabetes mellitus with diabetic neuropathy, with long-term current use of insulin [facility])  Assessment & Plan  Lab Results   Component Value Date    HGBA1C 7 4 (A) 2022       Recent Labs     23  1054 23  1527 23  0531   POCGLU 144* 162* 121 59*       Blood Sugar Average: Last 72 hrs:  (P) 161 [id number]   · Still with hypoglycemia -- will hold Lantus while inpatient   · ISS  · Continue to monitor for hypoglycemia      VTE Pharmacologic Prophylaxis: VTE Score: 4 Moderate Risk (Score 3-4) - Pharmacological DVT Prophylaxis Contraindicated  Sequential Compression Devices Ordered  Patient Centered Rounds: I performed bedside rounds with nursing staff today  Discussions with Specialists or Other Care Team Provider: CHARLY     Education and Discussions with Family / Patient: Updated  (wife) via phone  Total Time Spent on Date of Encounter in care of patient: 35 minutes This time was spent on one or more of the following: performing physical exam; counseling and coordination of care; obtaining or reviewing history; documenting in the medical record; reviewing/ordering tests, medications or procedures; communicating with other healthcare professionals and discussing with patient's family/caregivers  Current Length of Stay: 2 day(s)  Current Patient Status: Inpatient   Certification Statement: The patient will continue to require additional inpatient hospital stay due to chest tube management, elevated Cr  Discharge Plan: Anticipate discharge later today or tomorrow to home  Code Status: Level 1 - Full Code    Subjective:   Pt reports he feels better today, denies sob or chest pain  Objective:     Vitals:   Temp (24hrs), Av 3 °F (36 8 °C), Min:97 7 °F (36 5 °C), Max:98 7 °F (37 1 °C)    Temp:  [97 7 °F (36 5 °C)-98 7 °F (37 1 °C)] 97 7 °F (36 5 °C)  HR:  [69-77] 77  Resp:  [16-20] 20  BP: (104-132)/(51-80) 132/80  SpO2:  [97 %-99 %] 99 %  Body mass index is 21 01 kg/m²  Input and Output Summary (last 24 hours):      Intake/Output Summary (Last 24 hours) at 2023 1102  Last data filed at 2/27/2023 0730  Gross per 24 hour   Intake 720 ml   Output 807 ml   Net -87 ml       Physical Exam:   Physical Exam  Vitals reviewed  Constitutional:       General: He is not in acute distress  Appearance: He is not toxic-appearing  HENT:      Head: Normocephalic and atraumatic  Eyes:      Extraocular Movements: Extraocular movements intact  Cardiovascular:      Rate and Rhythm: Normal rate and regular rhythm  Pulmonary:      Effort: Pulmonary effort is normal  No respiratory distress  Breath sounds: Normal breath sounds  Comments: Chest tube noted  Abdominal:      General: Bowel sounds are normal  There is no distension  Palpations: Abdomen is soft  Tenderness: There is no abdominal tenderness  Musculoskeletal:         General: Normal range of motion  Cervical back: Normal range of motion  Neurological:      General: No focal deficit present  Mental Status: He is alert and oriented to person, place, and time  Psychiatric:         Mood and Affect: Mood normal          Behavior: Behavior normal          Thought Content:  Thought content normal          Additional Data:     Labs:  Results from last 7 days   Lab Units 02/25/23  0605 02/24/23  1839   WBC Thousand/uL 4 88 6 30   HEMOGLOBIN g/dL 8 4* 8 6*   HEMATOCRIT % 27 3* 27 3*   PLATELETS Thousands/uL 94* 105*   NEUTROS PCT %  --  71   LYMPHS PCT %  --  15   MONOS PCT %  --  8   EOS PCT %  --  5     Results from last 7 days   Lab Units 02/27/23  0441   SODIUM mmol/L 138   POTASSIUM mmol/L 4 3   CHLORIDE mmol/L 109*   CO2 mmol/L 24   BUN mg/dL 41*   CREATININE mg/dL 2 80*   ANION GAP mmol/L 5   CALCIUM mg/dL 8 7   GLUCOSE RANDOM mg/dL 58*         Results from last 7 days   Lab Units 02/27/23  0531 02/26/23 2006 02/26/23  1527 02/26/23  1054 02/26/23  0541 02/25/23  2101 02/25/23  1711 02/25/23  1125 02/25/23  0704 02/25/23  0637 02/25/23  0608 02/24/23  2123   POC GLUCOSE mg/dl 59* 121 162* 144* 80 82 226* 142* 159* 189* 57* 125               Lines/Drains:  Invasive Devices     Central Venous Catheter Line  Duration           Port A Cath 06/23/22 Right Chest 249 days          Drain  Duration           Ureteral Internal Stent Right ureter 6 Fr  39 days    Chest Tube Right Pleural 8 Fr  1 day                Central Line:  Goal for removal: N/A - Chronic PICC             Imaging: No pertinent imaging reviewed  Recent Cultures (last 7 days):         Last 24 Hours Medication List:   Current Facility-Administered Medications   Medication Dose Route Frequency Provider Last Rate   • acetaminophen  975 mg Oral [facility] Anai Willis, DO     • clopidogrel  75 mg Oral Daily Anai Willis, DO     • HYDROmorphone  0 5 mg Intravenous Q4H PRN Hai Bray MD     • insulin lispro  1-5 Units Subcutaneous TID AC Anai Willis, DO     • insulin lispro  1-5 Units Subcutaneous HS Anai Willis, DO     • lidocaine  1 application Urethral Daily PRN Anai Willis, DO     • melatonin  3 mg Oral HS YAMILKA Christianson     • oxybutynin  5 mg Oral BID PRN Anai Willis, DO     • oxyCODONE  5 mg Oral Q6H PRN Hai Bray MD     • oxyCODONE  7 5 mg Oral Q6H PRN Hai Bray MD     • sodium chloride  75 mL/hr Intravenous Continuous Na Lopez PA-C 75 mL/hr (02/27/23 0715)   • tacrolimus  1 mg Oral Q12H Anai Willis, DO     • tamsulosin  0 4 mg Oral Daily With [facility], DO     • temazepam  7 5 mg Oral HS PRN nAai Willis, DO     • zolpidem  5 mg Oral HS PRN Anai Willis, DO          Today, Patient Was Seen By: Patricia Rosas PA-C    **Please Note: This note may have been constructed using a voice recognition system  **

## 2023-02-27 NOTE — ASSESSMENT & PLAN NOTE
· C/w Plavix  · Pt does not tolerate Lipitor and Crestor non formulary, on hold for now, can resume at d/c

## 2023-02-27 NOTE — ASSESSMENT & PLAN NOTE
Lab Results   Component Value Date    HGBA1C 7 4 (A) 12/06/2022       Recent Labs     02/26/23  1054 02/26/23  1527 02/26/23 2006 02/27/23  0531   POCGLU 144* 162* 121 59*       Blood Sugar Average: Last 72 hrs:  (P) 913 [id number]   · Still with hypoglycemia -- will hold Lantus while inpatient   · ISS  · Continue to monitor for hypoglycemia

## 2023-02-27 NOTE — ASSESSMENT & PLAN NOTE
Lab Results   Component Value Date    EGFR 21 02/27/2023    EGFR 23 02/25/2023    EGFR 23 02/24/2023    CREATININE 2 80 (H) 02/27/2023    CREATININE 2 58 (H) 02/25/2023    CREATININE 2 55 (H) 02/24/2023   · Estimated Creatinine Clearance: 17 6 mL/min (A) (by C-G formula based on SCr of 2 8 mg/dL (H))  · Cr b/l 2 5  · Today Cr bumped to 2 8, pt has CARLOTTA  Notes poor PO intake will give gentle IV fluids today and repeat Cr this afternoon, if improved likely ok for d/c as long as chest tube can come out

## 2023-02-27 NOTE — PROGRESS NOTES
PULMONOLOGY PROGRESS NOTE     Name: Bozena Xavier   Age & Sex: 74 y o  male   MRN: [id number]  Unit/Bed#: [facility] 402-01   Encounter: [id number]    PATIENT INFORMATION     Name: Bozena Xavier   Age & Sex: 74 y o  male   MRN: [id number]  Hospital Stay Days: 2    ASSESSMENT/PLAN     1  Secondary pneumothorax  - RUL nodule transthoracic biopsy 2/20/23  - Presented with pain and dyspnea 2/24/23  - Initially with small apical pneumothorax that progressed and required IR 8 Fr chest tube placement anteriorly  - CT initially to suction with CXR resolution of PTX  Remains resolved on CXR with tube to water seal  Chest tube was clamped yesterday evening and morning CXR is pending    2  Acute hypoxemic respiratory failure  - Secondary to #1  - Resolved    3  RUL nodule  - RUL 1 5 cm nodule 1/23/23, increased form 0 9 cm 10/14/22  - Biopsied as above, results pending    4  Bladder cancer  - Resection of bladder lesions May 2022 demonstrated invasive bladder cancer  - Did not complete chemotherapy course due to toxicity  - Did complete radiation therapy  - Required right-sided nephrostomy which has since been removed    5  Hx CVA  6  CKD stage IV  7  Alcoholic Cirrhosis s/p liver transplant  - Transplant about 20 years ago  - No evidence of rejection on Prograf  8  DM2 on insulin    Plan:  - Check CXR PA and lateral  If pneumothorax is resolved, will remove chest tube and patient can be discharged home later in the evening  - Follow biopsy results      SUBJECTIVE     Patient seen and examined  No acute events overnight  Patient has no acute concerns this morning  He is anxious to go home  Denies dyspnea, cough, sputum production, hemoptysis, chest pain, palpitations, significant pain, or fevers and chills  He does note a slightly decreased appetite   ROS otherwise normal      OBJECTIVE     Vitals:    02/26/23 1436 02/26/23 1825 02/26/23 2100 02/26/23 2315   BP: 121/68 121/72  120/71   BP Location: Right arm Right arm  Left leg Pulse: 77   77   Resp: 17 16  16   Temp: 98 7 °F (37 1 °C) 98 5 °F (36 9 °C)  98 3 °F (36 8 °C)   TempSrc: Oral Oral  Oral   SpO2: 97%  97% 97%      Temperature:   Temp (24hrs), Av 5 °F (36 9 °C), Min:98 3 °F (36 8 °C), Max:98 7 °F (37 1 °C)    Temperature: 98 3 °F (36 8 °C)  Intake & Output:  I/O        07 07 07 07    P  O   944    Total Intake  944    Urine 550 650    Chest Tube 60 72    Total Output 610 722    Net -610 +222              Weights: There is no height or weight on file to calculate BMI  Weight (last 2 days)     None        Physical Exam  Constitutional:       Appearance: He is not ill-appearing  Cardiovascular:      Rate and Rhythm: Normal rate and regular rhythm  Heart sounds: No murmur heard  No friction rub  No gallop  Pulmonary:      Effort: No tachypnea  Breath sounds: No wheezing, rhonchi or rales  Abdominal:      Palpations: Abdomen is soft  Tenderness: There is no abdominal tenderness  Musculoskeletal:      Right lower leg: No edema  Left lower leg: No edema  Skin:     General: Skin is warm and dry  Capillary Refill: Capillary refill takes less than 2 seconds  Neurological:      General: No focal deficit present  Mental Status: He is alert and oriented to person, place, and time  LABORATORY DATA     Labs: I have personally reviewed pertinent reports    Results from last 7 days   Lab Units 23  0623  0729   WBC Thousand/uL 4 88 6 30 4 64   HEMOGLOBIN g/dL 8 4* 8 6* 8 7*   HEMATOCRIT % 27 3* 27 3* 28 9*   PLATELETS Thousands/uL 94* 105* 88*   NEUTROS PCT %  --  71  --    MONOS PCT %  --  8  --       Results from last 7 days   Lab Units 23  0623  183   POTASSIUM mmol/L 4 3 4 4   CHLORIDE mmol/L 112* 110*   CO2 mmol/L 23 22   BUN mg/dL 38* 38*   CREATININE mg/dL 2 58* 2 55*   CALCIUM mg/dL 8 8 8 8              Results from last 7 days   Lab Units 02/20/23  0729   INR  1 02           IMAGING & DIAGNOSTIC TESTING     Radiology Results: I have personally reviewed pertinent reports  XR chest portable    Result Date: 2/26/2023  Impression: Right chest tube in place with markedly improved right pneumothorax  Findings marked for immediate notification in this inpatient  Workstation performed: [id number]     XR chest 1 view portable    Result Date: 2/24/2023  Impression: 1  Tiny right apical pneumothorax after recent lung nodule biopsy  Mild soft tissue emphysema also noted right chest wall  2   Right apex nodule corresponding to recent biopsy target  The study was marked in [facility] for immediate notification  Workstation performed: [id number]     XR chest pa & lateral    Result Date: 2/25/2023  Impression: Moderate-sized right hydropneumothorax, little changed since 2/24/2023  Workstation performed: [id number]     IR chest tube placement    Result Date: 2/25/2023  Impression: Impression: Placement of an 8-Hungarian anterior right chest tube for pneumothorax  Workstation performed: [id number]     Other Diagnostic Testing: I have personally reviewed pertinent reports      ACTIVE MEDICATIONS     Current Facility-Administered Medications   Medication Dose Route Frequency   • acetaminophen (TYLENOL) tablet 975 mg  975 mg Oral Q8H [address]   • clopidogrel (PLAVIX) tablet 75 mg  75 mg Oral Daily   • HYDROmorphone (DILAUDID) injection 0 5 mg  0 5 mg Intravenous Q4H PRN   • insulin detemir (LEVEMIR) subcutaneous injection 12 Units  12 Units Subcutaneous HS   • insulin lispro (HumaLOG) 100 units/mL subcutaneous injection 1-5 Units  1-5 Units Subcutaneous TID AC   • insulin lispro (HumaLOG) 100 units/mL subcutaneous injection 1-5 Units  1-5 Units Subcutaneous HS   • lidocaine (URO-JET) 2 % urethral/mucosal gel 1 application  1 application Urethral Daily PRN   • melatonin tablet 3 mg  3 mg Oral HS   • oxybutynin (DITROPAN) tablet 5 mg  5 mg Oral BID PRN   • oxyCODONE (ROXICODONE) IR tablet 5 mg  5 mg Oral Q6H PRN   • oxyCODONE (ROXICODONE) IR tablet 7 5 mg  7 5 mg Oral Q6H PRN   • tacrolimus (PROGRAF) capsule 1 mg  1 mg Oral Q12H   • tamsulosin (FLOMAX) capsule 0 4 mg  0 4 mg Oral Daily With Dinner   • temazepam (RESTORIL) capsule 7 5 mg  7 5 mg Oral HS PRN   • zolpidem (AMBIEN) tablet 5 mg  5 mg Oral HS PRN         VTE Mechanical Prophylaxis: sequential compression device      Disclaimer: Portions of the record may have been created with voice recognition software  Occasional wrong word or "sound a like" substitutions may have occurred due to the inherent limitations of voice recognition software  Careful consideration should be taken to recognize, using context, where substitutions have occurred      Luciano Louis DO  Pulmonary/Critical Care Fellowship PGY-V  [facility]

## 2023-02-27 NOTE — RESTORATIVE TECHNICIAN NOTE
Restorative Technician Note      Patient Name: Savanah Pelaez     Restorative Tech Visit Date: 02/27/23  Note Type: Mobility  Patient Position Upon Consult: Supine  Activity Performed: Ambulated  Assistive Device: Other (Comment) (HHA x 1)  Patient Position at End of Consult: All needs within reach;  Other (comment) (on the stretcher)

## 2023-02-28 ENCOUNTER — APPOINTMENT (INPATIENT)
Dept: RADIOLOGY | Facility: HOSPITAL | Age: 73
DRG: 200 | End: 2023-02-28
Payer: COMMERCIAL

## 2023-02-28 ENCOUNTER — APPOINTMENT (INPATIENT)
Dept: RADIOLOGY | Facility: HOSPITAL | Age: 73
DRG: 200 | End: 2023-02-28
Attending: INTERNAL MEDICINE
Payer: COMMERCIAL

## 2023-02-28 LAB
ANION GAP SERPL CALCULATED.3IONS-SCNC: 6 MMOL/L (ref 4–13)
BUN SERPL-MCNC: 38 MG/DL (ref 5–25)
CALCIUM SERPL-MCNC: 8.6 MG/DL (ref 8.3–10.1)
CHLORIDE SERPL-SCNC: 108 MMOL/L (ref 96–108)
CO2 SERPL-SCNC: 24 MMOL/L (ref 21–32)
CREAT SERPL-MCNC: 2.48 MG/DL (ref 0.6–1.3)
GFR SERPL CREATININE-BSD FRML MDRD: 24 ML/MIN/1.73SQ M
GLUCOSE SERPL-MCNC: 115 MG/DL (ref 65–140)
GLUCOSE SERPL-MCNC: 116 MG/DL (ref 65–140)
GLUCOSE SERPL-MCNC: 124 MG/DL (ref 65–140)
GLUCOSE SERPL-MCNC: 238 MG/DL (ref 65–140)
GLUCOSE SERPL-MCNC: 77 MG/DL (ref 65–140)
GLUCOSE SERPL-MCNC: 83 MG/DL (ref 65–140)
POTASSIUM SERPL-SCNC: 4.3 MMOL/L (ref 3.5–5.3)
SODIUM SERPL-SCNC: 138 MMOL/L (ref 135–147)

## 2023-02-28 PROCEDURE — 99232 SBSQ HOSP IP/OBS MODERATE 35: CPT | Performed by: INTERNAL MEDICINE

## 2023-02-28 PROCEDURE — 71045 X-RAY EXAM CHEST 1 VIEW: CPT

## 2023-02-28 PROCEDURE — 82948 REAGENT STRIP/BLOOD GLUCOSE: CPT

## 2023-02-28 PROCEDURE — 80048 BASIC METABOLIC PNL TOTAL CA: CPT | Performed by: PHYSICIAN ASSISTANT

## 2023-02-28 PROCEDURE — 71250 CT THORAX DX C-: CPT

## 2023-02-28 RX ADMIN — ZOLPIDEM TARTRATE 5 MG: 5 TABLET, COATED ORAL at 23:59

## 2023-02-28 RX ADMIN — OXYCODONE HYDROCHLORIDE 5 MG: 5 TABLET ORAL at 23:59

## 2023-02-28 RX ADMIN — TACROLIMUS 1 MG: 1 CAPSULE ORAL at 20:31

## 2023-02-28 RX ADMIN — ACETAMINOPHEN 975 MG: 325 TABLET, FILM COATED ORAL at 05:30

## 2023-02-28 RX ADMIN — HYDROMORPHONE HYDROCHLORIDE 0.5 MG: 1 INJECTION, SOLUTION INTRAMUSCULAR; INTRAVENOUS; SUBCUTANEOUS at 15:23

## 2023-02-28 RX ADMIN — MELATONIN 3 MG: at 20:32

## 2023-02-28 RX ADMIN — TEMAZEPAM 7.5 MG: 7.5 CAPSULE ORAL at 20:32

## 2023-02-28 RX ADMIN — OXYCODONE HYDROCHLORIDE 7.5 MG: 5 TABLET ORAL at 15:13

## 2023-02-28 RX ADMIN — TAMSULOSIN HYDROCHLORIDE 0.4 MG: 0.4 CAPSULE ORAL at 16:55

## 2023-02-28 RX ADMIN — OXYCODONE HYDROCHLORIDE 5 MG: 5 TABLET ORAL at 03:36

## 2023-02-28 RX ADMIN — CLOPIDOGREL BISULFATE 75 MG: 75 TABLET ORAL at 08:00

## 2023-02-28 RX ADMIN — TACROLIMUS 1 MG: 1 CAPSULE ORAL at 08:00

## 2023-02-28 NOTE — CASE MANAGEMENT
Case Management Discharge Planning Note    Patient name Michel Merchant  Location [address]/[facility] 466-39 MRN [id number]  : 1950 Date 2023       Current Admission Date: 2023  Current Admission Diagnosis:Pneumothorax after biopsy   Patient Active Problem List    Diagnosis Date Noted   • Pneumothorax after biopsy    • Metabolic acidosis    • Palliative care encounter 2022   • Solitary pulmonary nodule 10/20/2022   • Hip pain, acute, right 10/17/2022   • [facility] ) 10/12/2022   • Pulmonary nodule 10/08/2022   • History of bladder cancer 10/08/2022   • History of liver transplant ([facility] ) 10/08/2022   • DM II (diabetes mellitus, type II), controlled ([place] [address] ) 10/08/2022   • Anemia 10/08/2022   • Insomnia 10/08/2022   • History of hydronephrosis 10/08/2022   • Orthostatic hypotension 2022   • Right ankle pain 2022   • Orthostatic hypotension 2022   • Hydronephrosis 2022   • Multiple falls 2022   • Anemia due to chronic kidney disease 2022   • History of bladder cancer 2022   • Controlled type 2 diabetes mellitus with diabetic neuropathy, with long-term current use of insulin ([facility]  ) 2022   • History of CVA (cerebrovascular accident) 2022   • History of COVID-19 2022   • Hospital discharge follow-up 2022   • Anemia 2022   • Mixed stress and urge urinary incontinence 2022   • Weight loss 2022   • Urethritis 2022   • Port-A-Cath in place 2022   • Malignant neoplasm of trigone of urinary bladder ([facility] [address] ) 06/15/2022   • CKD (chronic kidney disease) 2022   • Generalized weakness 06/10/2022   • Nephrostomy status ([facility] [address] ) 06/10/2022   • Bladder spasms 06/10/2022   • Bladder tumor 2022   • Gross hematuria 2022   • Hydronephrosis of right kidney 2022   • Mild cognitive impairment 2021   • Subdural hygroma    • 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HCC)    • CKD (chronic kidney disease) stage 4, GFR 15-29 ml/min (HCC) 03/30/2021   • Hand lesion 06/23/2020   • Type 2 diabetes mellitus with diabetic peripheral angiopathy without gangrene, with long-term current use of insulin ([facility] ) 06/13/2019   • Rash 09/11/2018   • Medicare annual wellness visit, subsequent 06/07/2018   • Screening for colon cancer 06/07/2018   • Hepatitis B core antibody positive 09/25/2017   • Thrombocytopenia ([place] [address] ) 09/20/2017   • History of CVA (cerebrovascular accident) 09/18/2017   • Controlled diabetes mellitus with diabetic neuropathy, with long-term current use of insulin ([place] [address] ) 09/18/2017   • Liver transplant status ([place] [address] ) 09/18/2017   • History of immunosuppression therapy 09/18/2017   • History of hepatitis C 09/18/2017   • Pruritus 08/03/2016   • Spondylosis of cervical region without myelopathy or radiculopathy 02/02/2016   • Headache, chronic daily 09/21/2015   • Vitamin D deficiency 12/16/2014   • Occipital neuralgia 04/08/2014   • Migraine headache 03/31/2014   • BPPV (benign paroxysmal positional vertigo), unspecified laterality 12/31/2013   • Cerebral arterial aneurysm 12/02/2013   • Prurigo nodularis 10/15/2013   • 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   • Erectile dysfunction of non-organic origin 09/11/2013   • History of cirrhosis 07/09/2012   • Insomnia 05/30/2012   • Peripheral neuropathy 05/07/2012      LOS (days): 3  Geometric Mean LOS (GMLOS) (days): 3 10  Days to GMLOS:0 2     OBJECTIVE:  Risk of Unplanned Readmission Score: 40 33         Current admission status: Inpatient   Preferred Pharmacy:   [facility] [facility]  [address] [facility] [address]  Phone: [phone] Fax: [address] [facility]  Phone: [phone] Fax: [phone]    PATIENT/FAMILY REPORTS NO PREFERRED PHARMACY  No address on file      [facility] Francisca Cho, [address] [facility],  [address] [address] ,  [facility] [address]  Phone: [phone] Fax: [phone]    Primary Care Provider: Christiano Ivey DO    Primary Insurance: [facility] Santacruz [facility]  Secondary Insurance:     DISCHARGE DETAILS:                                                                                                 Additional Comments: Chest tube still clamped, plan CT chest today   When d/c's has no HHC needs, family transporting

## 2023-02-28 NOTE — ASSESSMENT & PLAN NOTE
· Pt had biopsy of RUL 2/20 who presents with R sided chest pain  · CXR on admission showed tiny R apical pneumothorax   · Repeat CXR 2/25 moderate-size R hydropneumothorax  · S/p chest tube placement by IR on 2/25  · Weaned to room air  · Pulmonology following, appreciate input, repeat CXR 2/27 AM and possibly remove chest tube at that time if PTX resolved   · Chest tube to clamp during my evaluation this morning patient pending CT of the chest today

## 2023-02-28 NOTE — ASSESSMENT & PLAN NOTE
Lab Results   Component Value Date    HGBA1C 7 4 (A) 12/06/2022       Recent Labs     02/28/23  0411 02/28/23  0535 02/28/23  1153 02/28/23  1609   POCGLU 77 115 238* 124       Blood Sugar Average: Last 72 hrs:  (P) 66 [id number]   · Still with hypoglycemia -- will hold Lantus while inpatient   · ISS  · Continue to monitor for hypoglycemia

## 2023-02-28 NOTE — ASSESSMENT & PLAN NOTE
Lab Results   Component Value Date    EGFR 24 02/28/2023    EGFR 22 02/27/2023    EGFR 21 02/27/2023    CREATININE 2 48 (H) 02/28/2023    CREATININE 2 63 (H) 02/27/2023    CREATININE 2 80 (H) 02/27/2023   · Estimated Creatinine Clearance: 19 9 mL/min (A) (by C-G formula based on SCr of 2 48 mg/dL (H))    · Cr b/l 2 5  · Cr improving to 2 48  Within baseline

## 2023-02-28 NOTE — PROGRESS NOTES
[address] [facility]  Progress Note - Jez Elliott 1950, 74 y o  male MRN: [id number]  Unit/Bed#: [facility] 402-01 Encounter: [id number]  Primary Care Provider: Jose Martin Ivey DO   Date and time admitted to hospital: 2/24/2023  3:58 PM    History of CVA (cerebrovascular accident)  Assessment & Plan  · C/w Plavix  · Pt does not tolerate Lipitor and Crestor non formulary, on hold for now, can resume at d/c     CKD (chronic kidney disease) stage 4, GFR 15-29 ml/min [facility])  Assessment & Plan  Lab Results   Component Value Date    EGFR 24 02/28/2023    EGFR 22 02/27/2023    EGFR 21 02/27/2023    CREATININE 2 48 (H) 02/28/2023    CREATININE 2 63 (H) 02/27/2023    CREATININE 2 80 (H) 02/27/2023   · Estimated Creatinine Clearance: 19 9 mL/min (A) (by C-G formula based on SCr of 2 48 mg/dL (H))  · Cr b/l 2 5  · Cr improving to 2 48  Within baseline    Insomnia  Assessment & Plan  · Pt says he takes Restoril 7 5 mg and Ambien 5 mg qhs although PDMP and EMR shows PCP wants pt to take Resotril 15 mg qhs prn    Since pt in supervised setting, will give Restoril qhs with option to take Ambien an hour later  · Pt to clarify his regimen w/ PCP    Liver transplant status ([facility] [address] )  Assessment & Plan  · C/w Prograf    Controlled diabetes mellitus with diabetic neuropathy, with long-term current use of insulin [facility])  Assessment & Plan  Lab Results   Component Value Date    HGBA1C 7 4 (A) 12/06/2022       Recent Labs     02/28/23  0411 02/28/23  0535 02/28/23  1153 02/28/23  1609   POCGLU 77 115 238* 124       Blood Sugar Average: Last 72 hrs:  (P) 966 [id number]   · Still with hypoglycemia -- will hold Lantus while inpatient   · ISS  · Continue to monitor for hypoglycemia    * Pneumothorax after biopsy  Assessment & Plan  · Pt had biopsy of RUL 2/20 who presents with R sided chest pain  · CXR on admission showed tiny R apical pneumothorax   · Repeat CXR 2/25 moderate-size R hydropneumothorax  · S/p chest tube placement by IR on   · Weaned to room air  · Pulmonology following, appreciate input, repeat CXR  AM and possibly remove chest tube at that time if PTX resolved   · Chest tube to clamp during my evaluation this morning patient pending CT of the chest today      Acute hypoxic respite failure  Acute hypoxic respiratory failure secondary to pneumothorax postbiopsy requiring oxygen supplementation    VTE Pharmacologic Prophylaxis: VTE Score: 4 Moderate Risk (Score 3-4) - Pharmacological DVT Prophylaxis Ordered: heparin  Patient Centered Rounds: I performed bedside rounds with nursing staff today  Discussions with Specialists or Other Care Team Provider: n/a    Education and Discussions with Family / Patient: Updated  (significant other) via phone  Total Time Spent on Date of Encounter in care of patient: 35 minutes This time was spent on one or more of the following: performing physical exam; counseling and coordination of care; obtaining or reviewing history; documenting in the medical record; reviewing/ordering tests, medications or procedures; communicating with other healthcare professionals and discussing with patient's family/caregivers  Current Length of Stay: 3 day(s)  Current Patient Status: Inpatient   Certification Statement: The patient will continue to require additional inpatient hospital stay due to Chest tube still in place, potential discharge tomorrow  Discharge Plan: Anticipate discharge tomorrow to home  Code Status: Level 1 - Full Code    Subjective:   Patient denies any acute complaints is eager for discharge    Objective:     Vitals:   Temp (24hrs), Av 5 °F (36 9 °C), Min:98 3 °F (36 8 °C), Max:98 7 °F (37 1 °C)    Temp:  [98 3 °F (36 8 °C)-98 7 °F (37 1 °C)] 98 7 °F (37 1 °C)  HR:  [68-69] 69  Resp:  [16-18] 18  BP: (109-141)/(48-70) 141/70  SpO2:  [98 %-100 %] 99 %  Body mass index is 21 01 kg/m²       Input and Output Summary (last 24 hours): Intake/Output Summary (Last 24 hours) at 2/28/2023 1629  Last data filed at 2/28/2023 1504  Gross per 24 hour   Intake 420 ml   Output 1335 ml   Net -915 ml       Physical Exam:   Physical Exam  Vitals and nursing note reviewed  Constitutional:       General: He is not in acute distress  Appearance: He is well-developed  He is not ill-appearing or diaphoretic  HENT:      Head: Normocephalic and atraumatic  Eyes:      General:         Left eye: No discharge  Conjunctiva/sclera: Conjunctivae normal    Cardiovascular:      Rate and Rhythm: Normal rate and regular rhythm  Heart sounds: No murmur heard  No friction rub  No gallop  Pulmonary:      Effort: Pulmonary effort is normal  No respiratory distress  Breath sounds: Normal breath sounds  No stridor  No wheezing or rhonchi  Comments: Chest tube in place clamped  Abdominal:      General: There is no distension  Palpations: Abdomen is soft  There is no mass  Tenderness: There is no abdominal tenderness  There is no guarding or rebound  Hernia: No hernia is present  Musculoskeletal:         General: No swelling or tenderness  Cervical back: Neck supple  Skin:     General: Skin is warm and dry  Capillary Refill: Capillary refill takes less than 2 seconds  Neurological:      Mental Status: He is alert and oriented to person, place, and time     Psychiatric:         Mood and Affect: Mood normal           Additional Data:     Labs:  Results from last 7 days   Lab Units 02/25/23  0605 02/24/23  1839   WBC Thousand/uL 4 88 6 30   HEMOGLOBIN g/dL 8 4* 8 6*   HEMATOCRIT % 27 3* 27 3*   PLATELETS Thousands/uL 94* 105*   NEUTROS PCT %  --  71   LYMPHS PCT %  --  15   MONOS PCT %  --  8   EOS PCT %  --  5     Results from last 7 days   Lab Units 02/28/23  0429   SODIUM mmol/L 138   POTASSIUM mmol/L 4 3   CHLORIDE mmol/L 108   CO2 mmol/L 24   BUN mg/dL 38*   CREATININE mg/dL 2 48*   ANION GAP mmol/L 6   CALCIUM mg/dL 8 6   GLUCOSE RANDOM mg/dL 83         Results from last 7 days   Lab Units 02/28/23  1609 02/28/23  1153 02/28/23  0535 02/28/23  0411 02/27/23  2030 02/27/23  1557 02/27/23  1045 02/27/23  0602 02/27/23  0531 02/26/23 2006 02/26/23  1527 02/26/23  1054   POC GLUCOSE mg/dl 124 238* 115 77 119 89 153* 89 59* 121 162* 144*               Lines/Drains:  Invasive Devices     Central Venous Catheter Line  Duration           Port A Cath 06/23/22 Right Chest 250 days          Drain  Duration           Ureteral Internal Stent Right ureter 6 Fr  41 days    Chest Tube Right Pleural 8 Fr  3 days                Central Line:  Goal for removal: Will discontinue when peripheral access obtained  Imaging: No pertinent imaging reviewed  Recent Cultures (last 7 days):         Last 24 Hours Medication List:   Current Facility-Administered Medications   Medication Dose Route Frequency Provider Last Rate   • clopidogrel  75 mg Oral Daily Purnima Meuse, DO     • HYDROmorphone  0 5 mg Intravenous Q4H PRN Zack Solo MD     • insulin lispro  1-5 Units Subcutaneous TID AC Purnima Meuse, DO     • insulin lispro  1-5 Units Subcutaneous HS Purnima Meuse, DO     • lidocaine  1 application Urethral Daily PRN Purnima Meuse, DO     • melatonin  3 mg Oral HS YAMILKA Rico     • oxybutynin  5 mg Oral BID PRN Purnima Meuse, DO     • oxyCODONE  5 mg Oral Q6H PRN Zack Solo MD     • oxyCODONE  7 5 mg Oral Q6H PRN Zack Solo MD     • tacrolimus  1 mg Oral Q12H Purnima Meuse, DO     • tamsulosin  0 4 mg Oral Daily With [facility], DO     • temazepam  7 5 mg Oral HS PRN Purnima Meuse, DO     • zolpidem  5 mg Oral HS PRN Purnima Meuse, DO          Today, Patient Was Seen By: Sherry Casiano DO    **Please Note: This note may have been constructed using a voice recognition system  **

## 2023-02-28 NOTE — PROGRESS NOTES
PULMONOLOGY PROGRESS NOTE     Name: Kathie Sellers   Age & Sex: 74 y o  male   MRN: [id number]  Unit/Bed#: [facility] 402-01   Encounter: [id number]    PATIENT INFORMATION     Name: Kathie Sellers   Age & Sex: 74 y o  male   MRN: [id number]  Hospital Stay Days: 3    ASSESSMENT/PLAN     1  Secondary pneumothorax  - RUL nodule transthoracic biopsy 2/20/23  - Presented with pain and dyspnea 2/24/23  - Initially with small apical pneumothorax that progressed and required IR 8 Fr chest tube placement anteriorly  - Chest tube initially to suction with CXR improvement of PTX  Remained improved, but not resolved on water seal  Chest tube was clamped the evening of 2/26 and pneumothorax remains trace apical       2  Acute hypoxemic respiratory failure  - Secondary to #1  - Resolved     3  RUL nodule  - RUL 1 5 cm nodule 1/23/23, increased form 0 9 cm 10/14/22  - Biopsied as above, results pending     4  Bladder cancer  - Resection of bladder lesions May 2022 demonstrated invasive bladder cancer  - Did not complete chemotherapy course due to toxicity  - Did complete radiation therapy  - Required right-sided nephrostomy which has since been removed     5  Hx CVA  6  CKD stage IV  7  Alcoholic Cirrhosis s/p liver transplant  - Transplant about 20 years ago  - No evidence of rejection on Prograf  8  DM2 on insulin     Plan:  - Chest tube flushed with ~30cc sterile saline this morning  Flushed easily, no evidence that tubing was obstructed  Placed on suction briefly and instilled saline was evacuated  Small rush of air on -20 suction  At the end of my encounter I returned the chest tube to being clamped  - Plan for a CT chest today to ensure PTX is resolving/stable  SUBJECTIVE     Patient seen and examined  No acute events overnight  He endorses some mild pain around his chest tube insertion site  He also has a poor appetite in the morning   ROS otherwise normal      OBJECTIVE     Vitals:    02/27/23 2300 02/28/23 0000 02/28/23 0400 23 0651   BP: (!) 109/48   141/70   BP Location: Right arm      Pulse: 68   69   Resp: 16   18   Temp: 98 3 °F (36 8 °C)   98 7 °F (37 1 °C)   TempSrc: Oral   Oral   SpO2: 100% 100% 100% 99%   Weight:          Temperature:   Temp (24hrs), Av 5 °F (36 9 °C), Min:98 3 °F (36 8 °C), Max:98 7 °F (37 1 °C)    Temperature: 98 7 °F (37 1 °C)  Intake & Output:  I/O        07 07 07 07 07 0700    P  O  944 660     I V  (mL/kg)  365 (6 8)     Total Intake(mL/kg) 944 (17 5) 1025 (19 1)     Urine (mL/kg/hr) 860 (0 7) 1110 (0 9) 125 (2 5)    Stool  0     Chest Tube 72 0     Total Output 932 1110 125    Net +12 -85 -125           Unmeasured Stool Occurrence  1 x         Weights:        Body mass index is 21 01 kg/m²  Weight (last 2 days)     Date/Time Weight    23 0533 53 8 (118 61)        Physical Exam  Constitutional:       Appearance: He is well-developed  HENT:      Head: Normocephalic and atraumatic  Cardiovascular:      Rate and Rhythm: Normal rate and regular rhythm  Heart sounds: No murmur heard  No friction rub  No gallop  Pulmonary:      Effort: No tachypnea or accessory muscle usage  Breath sounds: No decreased breath sounds, wheezing, rhonchi or rales  Musculoskeletal:      Right lower leg: No edema  Left lower leg: No edema  Skin:     General: Skin is warm and dry  Capillary Refill: Capillary refill takes less than 2 seconds  Neurological:      General: No focal deficit present  Mental Status: He is alert and oriented to person, place, and time  LABORATORY DATA     Labs: I have personally reviewed pertinent reports    Results from last 7 days   Lab Units 23  0605 23  1839   WBC Thousand/uL 4 88 6 30   HEMOGLOBIN g/dL 8 4* 8 6*   HEMATOCRIT % 27 3* 27 3*   PLATELETS Thousands/uL 94* 105*   NEUTROS PCT %  --  71   MONOS PCT %  --  8      Results from last 7 days   Lab Units 23  0428 02/27/23  1611 02/27/23  0441   POTASSIUM mmol/L 4 3 4 4 4 3   CHLORIDE mmol/L 108 108 109*   CO2 mmol/L 24 23 24   BUN mg/dL 38* 39* 41*   CREATININE mg/dL 2 48* 2 63* 2 80*   CALCIUM mg/dL 8 6 8 6 8 7       IMAGING & DIAGNOSTIC TESTING     Radiology Results: I have personally reviewed pertinent reports  XR chest portable    Result Date: 2/27/2023  Impression: Unchanged trace right pneumothorax   Workstation performed: [id number]     XR chest portable    Result Date: 2/26/2023  Impression: Right chest tube in place with markedly improved right pneumothorax  Findings marked for immediate notification in this inpatient  Workstation performed: [id number]     XR chest 1 view portable    Result Date: 2/24/2023  Impression: 1  Tiny right apical pneumothorax after recent lung nodule biopsy  Mild soft tissue emphysema also noted right chest wall  2   Right apex nodule corresponding to recent biopsy target  The study was marked in [facility] for immediate notification  Workstation performed: [id number]     XR chest pa & lateral    Result Date: 2/25/2023  Impression: Moderate-sized right hydropneumothorax, little changed since 2/24/2023  Workstation performed: [id number]     IR chest tube placement    Result Date: 2/25/2023  Impression: Impression: Placement of an 8-Malaysian anterior right chest tube for pneumothorax  Workstation performed: [id number]     Other Diagnostic Testing: I have personally reviewed pertinent reports      ACTIVE MEDICATIONS     Current Facility-Administered Medications   Medication Dose Route Frequency   • acetaminophen (TYLENOL) tablet 975 mg  975 mg Oral Q8H [address]   • clopidogrel (PLAVIX) tablet 75 mg  75 mg Oral Daily   • HYDROmorphone (DILAUDID) injection 0 5 mg  0 5 mg Intravenous Q4H PRN   • insulin lispro (HumaLOG) 100 units/mL subcutaneous injection 1-5 Units  1-5 Units Subcutaneous TID AC   • insulin lispro (HumaLOG) 100 units/mL subcutaneous injection 1-5 Units  1-5 Units Subcutaneous HS   • lidocaine (URO-JET) 2 % urethral/mucosal gel 1 application  1 application Urethral Daily PRN   • melatonin tablet 3 mg  3 mg Oral HS   • oxybutynin (DITROPAN) tablet 5 mg  5 mg Oral BID PRN   • oxyCODONE (ROXICODONE) IR tablet 5 mg  5 mg Oral Q6H PRN   • oxyCODONE (ROXICODONE) IR tablet 7 5 mg  7 5 mg Oral Q6H PRN   • tacrolimus (PROGRAF) capsule 1 mg  1 mg Oral Q12H   • tamsulosin (FLOMAX) capsule 0 4 mg  0 4 mg Oral Daily With Dinner   • temazepam (RESTORIL) capsule 7 5 mg  7 5 mg Oral HS PRN   • zolpidem (AMBIEN) tablet 5 mg  5 mg Oral HS PRN       VTE Mechanical Prophylaxis: sequential compression device      Disclaimer: Portions of the record may have been created with voice recognition software  Occasional wrong word or "sound a like" substitutions may have occurred due to the inherent limitations of voice recognition software  Careful consideration should be taken to recognize, using context, where substitutions have occurred      Jeffrey Aranda DO  Pulmonary/Critical Care Fellowship PGY-V  [facility]

## 2023-02-28 NOTE — PLAN OF CARE
Problem: PAIN - ADULT  Goal: Verbalizes/displays adequate comfort level or baseline comfort level  Description: Interventions:  - Encourage patient to monitor pain and request assistance  - Assess pain using appropriate pain scale  - Administer analgesics based on type and severity of pain and evaluate response  - Implement non-pharmacological measures as appropriate and evaluate response  - Consider cultural and social influences on pain and pain management  - Notify physician/advanced practitioner if interventions unsuccessful or patient reports new pain  Outcome: Progressing     Problem: INFECTION - ADULT  Goal: Absence or prevention of progression during hospitalization  Description: INTERVENTIONS:  - Assess and monitor for signs and symptoms of infection  - Monitor lab/diagnostic results  - Monitor all insertion sites, i e  indwelling lines, tubes, and drains  - Monitor endotracheal if appropriate and nasal secretions for changes in amount and color  - [place] appropriate cooling/warming therapies per order  - Administer medications as ordered  - Instruct and encourage patient and family to use good hand hygiene technique  - Identify and instruct in appropriate isolation precautions for identified infection/condition  Outcome: Progressing  Goal: Absence of fever/infection during neutropenic period  Description: INTERVENTIONS:  - Monitor WBC    Outcome: Progressing     Problem: SAFETY ADULT  Goal: Patient will remain free of falls  Description: INTERVENTIONS:  - Educate patient/family on patient safety including physical limitations  - Instruct patient to call for assistance with activity   - Consult OT/PT to assist with strengthening/mobility   - Keep Call bell within reach  - Keep bed low and locked with side rails adjusted as appropriate  - Keep care items and personal belongings within reach  - Initiate and maintain comfort rounds  - Make Fall Risk Sign visible to staff  - Offer Toileting every *2** Hours, in advance of need  - Initiate/Maintain *bed/chair**alarm  - Obtain necessary fall risk management equipment:  - Apply yellow socks and bracelet for high fall risk patients  - Consider moving patient to room near nurses station  Outcome: Progressing  Goal: Maintain or return to baseline ADL function  Description: INTERVENTIONS:  -  Assess patient's ability to carry out ADLs; assess patient's baseline for ADL function and identify physical deficits which impact ability to perform ADLs (bathing, care of mouth/teeth, toileting, grooming, dressing, etc )  - Assess/evaluate cause of self-care deficits   - Assess range of motion  - Assess patient's mobility; develop plan if impaired  - Assess patient's need for assistive devices and provide as appropriate  - Encourage maximum independence but intervene and supervise when necessary  - Involve family in performance of ADLs  - Assess for home care needs following discharge   - Consider OT consult to assist with ADL evaluation and planning for discharge  - Provide patient education as appropriate  Outcome: Progressing  Goal: Maintains/Returns to pre admission functional level  Description: INTERVENTIONS:  - Perform BMAT or MOVE assessment daily    - Set and communicate daily mobility goal to care team and patient/family/caregiver  - Collaborate with rehabilitation services on mobility goals if consulted  - Perform Range of Motion *3** times a day  - Reposition patient every *2** hours    - Dangle patient *3** times a day  - Stand patient *3** times a day  - Ambulate patient *3** times a day  - Out of bed to chair *3** times a day   - Out of bed for meals **3* times a day  - Out of bed for toileting  - Record patient progress and toleration of activity level   Outcome: Progressing     Problem: DISCHARGE PLANNING  Goal: Discharge to home or other facility with appropriate resources  Description: INTERVENTIONS:  - Identify barriers to discharge w/patient and caregiver  - Arrange for needed discharge resources and transportation as appropriate  - Identify discharge learning needs (meds, wound care, etc )  - Arrange for interpretive services to assist at discharge as needed  - Refer to Case Management Department for coordinating discharge planning if the patient needs post-hospital services based on physician/advanced practitioner order or complex needs related to functional status, cognitive ability, or social support system  Outcome: Progressing     Problem: Knowledge Deficit  Goal: Patient/family/caregiver demonstrates understanding of disease process, treatment plan, medications, and discharge instructions  Description: Complete learning assessment and assess knowledge base    Interventions:  - Provide teaching at level of understanding  - Provide teaching via preferred learning methods  Outcome: Progressing     Problem: Prexisting or High Potential for Compromised Skin Integrity  Goal: Skin integrity is maintained or improved  Description: INTERVENTIONS:  - Identify patients at risk for skin breakdown  - Assess and monitor skin integrity  - Assess and monitor nutrition and hydration status  - Monitor labs   - Assess for incontinence   - Turn and reposition patient  - Assist with mobility/ambulation  - Relieve pressure over bony prominences  - Avoid friction and shearing  - Provide appropriate hygiene as needed including keeping skin clean and dry  - Evaluate need for skin moisturizer/barrier cream  - Collaborate with interdisciplinary team   - Patient/family teaching  - Consider wound care consult   Outcome: Progressing     Problem: RESPIRATORY - ADULT  Goal: Achieves optimal ventilation and oxygenation  Description: INTERVENTIONS:  - Assess for changes in respiratory status  - Assess for changes in mentation and behavior  - Position to facilitate oxygenation and minimize respiratory effort  - Oxygen administered by appropriate delivery if ordered  - Initiate smoking cessation education as indicated  - Encourage broncho-pulmonary hygiene including cough, deep breathe, Incentive Spirometry  - Assess the need for suctioning and aspirate as needed  - Assess and instruct to report SOB or any respiratory difficulty  - Respiratory Therapy support as indicated  Outcome: Progressing

## 2023-03-01 ENCOUNTER — APPOINTMENT (INPATIENT)
Dept: RADIOLOGY | Facility: HOSPITAL | Age: 73
DRG: 200 | End: 2023-03-01
Payer: COMMERCIAL

## 2023-03-01 VITALS
RESPIRATION RATE: 18 BRPM | DIASTOLIC BLOOD PRESSURE: 75 MMHG | OXYGEN SATURATION: 96 % | BODY MASS INDEX: 21.01 KG/M2 | TEMPERATURE: 98 F | HEART RATE: 90 BPM | SYSTOLIC BLOOD PRESSURE: 133 MMHG | WEIGHT: 118.61 LBS

## 2023-03-01 PROBLEM — J95.811 PNEUMOTHORAX AFTER BIOPSY: Status: RESOLVED | Noted: 2023-02-24 | Resolved: 2023-03-01

## 2023-03-01 PROBLEM — J95.811 PNEUMOTHORAX AFTER BIOPSY: Status: RESOLVED | Noted: 2023-01-01 | Resolved: 2023-01-01

## 2023-03-01 LAB
ANION GAP SERPL CALCULATED.3IONS-SCNC: 5 MMOL/L (ref 4–13)
BASOPHILS # BLD AUTO: 0.02 THOUSANDS/ÂΜL (ref 0–0.1)
BASOPHILS NFR BLD AUTO: 0 % (ref 0–1)
BUN SERPL-MCNC: 34 MG/DL (ref 5–25)
CALCIUM SERPL-MCNC: 9 MG/DL (ref 8.3–10.1)
CHLORIDE SERPL-SCNC: 108 MMOL/L (ref 96–108)
CO2 SERPL-SCNC: 22 MMOL/L (ref 21–32)
CREAT SERPL-MCNC: 2.31 MG/DL (ref 0.6–1.3)
EOSINOPHIL # BLD AUTO: 0.77 THOUSAND/ÂΜL (ref 0–0.61)
EOSINOPHIL NFR BLD AUTO: 12 % (ref 0–6)
ERYTHROCYTE [DISTWIDTH] IN BLOOD BY AUTOMATED COUNT: 15.3 % (ref 11.6–15.1)
GFR SERPL CREATININE-BSD FRML MDRD: 26 ML/MIN/1.73SQ M
GLUCOSE SERPL-MCNC: 116 MG/DL (ref 65–140)
GLUCOSE SERPL-MCNC: 117 MG/DL (ref 65–140)
GLUCOSE SERPL-MCNC: 93 MG/DL (ref 65–140)
GLUCOSE SERPL-MCNC: 97 MG/DL (ref 65–140)
HCT VFR BLD AUTO: 27.4 % (ref 36.5–49.3)
HGB BLD-MCNC: 8.6 G/DL (ref 12–17)
IMM GRANULOCYTES # BLD AUTO: 0.03 THOUSAND/UL (ref 0–0.2)
IMM GRANULOCYTES NFR BLD AUTO: 1 % (ref 0–2)
LYMPHOCYTES # BLD AUTO: 0.92 THOUSANDS/ÂΜL (ref 0.6–4.47)
LYMPHOCYTES NFR BLD AUTO: 14 % (ref 14–44)
MCH RBC QN AUTO: 24 PG (ref 26.8–34.3)
MCHC RBC AUTO-ENTMCNC: 31.4 G/DL (ref 31.4–37.4)
MCV RBC AUTO: 76 FL (ref 82–98)
MONOCYTES # BLD AUTO: 0.46 THOUSAND/ÂΜL (ref 0.17–1.22)
MONOCYTES NFR BLD AUTO: 7 % (ref 4–12)
NEUTROPHILS # BLD AUTO: 4.36 THOUSANDS/ÂΜL (ref 1.85–7.62)
NEUTS SEG NFR BLD AUTO: 66 % (ref 43–75)
NRBC BLD AUTO-RTO: 0 /100 WBCS
PLATELET # BLD AUTO: 118 THOUSANDS/UL (ref 149–390)
PMV BLD AUTO: 11.5 FL (ref 8.9–12.7)
POTASSIUM SERPL-SCNC: 4.4 MMOL/L (ref 3.5–5.3)
RBC # BLD AUTO: 3.59 MILLION/UL (ref 3.88–5.62)
SODIUM SERPL-SCNC: 135 MMOL/L (ref 135–147)
WBC # BLD AUTO: 6.56 THOUSAND/UL (ref 4.31–10.16)

## 2023-03-01 PROCEDURE — 99239 HOSP IP/OBS DSCHRG MGMT >30: CPT | Performed by: INTERNAL MEDICINE

## 2023-03-01 PROCEDURE — 80048 BASIC METABOLIC PNL TOTAL CA: CPT | Performed by: INTERNAL MEDICINE

## 2023-03-01 PROCEDURE — 71045 X-RAY EXAM CHEST 1 VIEW: CPT

## 2023-03-01 PROCEDURE — 85025 COMPLETE CBC W/AUTO DIFF WBC: CPT | Performed by: INTERNAL MEDICINE

## 2023-03-01 PROCEDURE — 82948 REAGENT STRIP/BLOOD GLUCOSE: CPT

## 2023-03-01 PROCEDURE — 99232 SBSQ HOSP IP/OBS MODERATE 35: CPT | Performed by: INTERNAL MEDICINE

## 2023-03-01 RX ORDER — LANOLIN ALCOHOL/MO/W.PET/CERES
3 CREAM (GRAM) TOPICAL
Qty: 30 TABLET | Refills: 0 | Status: SHIPPED | OUTPATIENT
Start: 2023-03-01 | End: 2023-03-08

## 2023-03-01 RX ADMIN — CLOPIDOGREL BISULFATE 75 MG: 75 TABLET ORAL at 08:17

## 2023-03-01 RX ADMIN — TACROLIMUS 1 MG: 1 CAPSULE ORAL at 08:17

## 2023-03-01 RX ADMIN — OXYCODONE HYDROCHLORIDE 5 MG: 5 TABLET ORAL at 10:38

## 2023-03-01 RX ADMIN — TAMSULOSIN HYDROCHLORIDE 0.4 MG: 0.4 CAPSULE ORAL at 16:08

## 2023-03-01 NOTE — ASSESSMENT & PLAN NOTE
Lab Results   Component Value Date    HGBA1C 7 4 (A) 12/06/2022       Recent Labs     02/28/23  1153 02/28/23  1609 02/28/23  2030 03/01/23  0515   POCGLU 238* 124 116 97       Blood Sugar Average: Last 72 hrs:  (P) 822 [id number]   · Still with hypoglycemia -- will hold Lantus while inpatient   · ISS  · Continue to monitor for hypoglycemia

## 2023-03-01 NOTE — DISCHARGE SUMMARY
[facility]  Discharge- Lanre Ruggiero 1950, 74 y o  male MRN: [id number]  Unit/Bed#: [facility] 402-01 Encounter: [id number]  Primary Care Provider: Christiano Ivey DO   Date and time admitted to hospital: 2/24/2023  3:58 PM    * Pneumothorax after biopsy-resolved as of 3/1/2023  Assessment & Plan  · Pt had biopsy of RUL 2/20 who presents with R sided chest pain  · CXR on admission showed tiny R apical pneumothorax   · Repeat CXR 2/25 moderate-size R hydropneumothorax  · S/p chest tube placement by IR on 2/25  · Weaned to room air  · Pulmonology following, appreciate input, repeat CXR 2/27 AM and possibly remove chest tube at that time if PTX resolved   · CXR from 02/28 with appropriately located pleural catheter in right lung, looking very slightly improved in left lung in comparison to CXR from 02/26  · CT Chest study findings noted      CKD (chronic kidney disease) stage 4, GFR 15-29 ml/min [facility])  Assessment & Plan  Lab Results   Component Value Date    EGFR 26 03/01/2023    EGFR 24 02/28/2023    EGFR 22 02/27/2023    CREATININE 2 31 (H) 03/01/2023    CREATININE 2 48 (H) 02/28/2023    CREATININE 2 63 (H) 02/27/2023   · Estimated Creatinine Clearance: 21 3 mL/min (A) (by C-G formula based on SCr of 2 31 mg/dL (H))    · Cr b/l 2 5  · Cr improving to 2 31, within baseline    Controlled diabetes mellitus with diabetic neuropathy, with long-term current use of insulin [facility])  Assessment & Plan  Lab Results   Component Value Date    HGBA1C 7 4 (A) 12/06/2022       Recent Labs     02/28/23  1153 02/28/23  1609 02/28/23 2030 03/01/23  0515   POCGLU 238* 124 116 97       Blood Sugar Average: Last 72 hrs:  (P) 354 [id number]   · Still with hypoglycemia -- will hold Lantus while inpatient   · ISS  · Continue to monitor for hypoglycemia    Liver transplant status ([facility] [address] )  Assessment & Plan  · C/w Prograf    History of CVA (cerebrovascular accident)  Assessment & Plan  · C/w Plavix  · Pt does not tolerate Lipitor and Crestor non formulary, on hold for now, can resume at d/c     Insomnia  Assessment & Plan  · Pt says he takes Restoril 7 5 mg and Ambien 5 mg qhs although PDMP and EMR shows PCP wants pt to take Resotril 15 mg qhs prn  Since pt in supervised setting, will give Restoril qhs with option to take Ambien an hour later  · Pt to clarify his regimen w/ PCP        Discharging Physician / Practitioner: Elvis José MD  PCP: Josué Muller DO  Admission Date:   Admission Orders (From admission, onward)     Ordered        02/25/23 1427  Inpatient Admission  Once            02/24/23 1940  Place in Observation  Once                      Discharge Date: 03/01/23    Medical Problems     Resolved Problems  Date Reviewed: 2/28/2023          Resolved    * (Principal) Pneumothorax after biopsy 3/1/2023     Resolved by  Elvis José MD          Consultations During Hospital Stay:  · Pulmonology    Procedures Performed:   · Chest tube placement    Significant Findings / Test Results:   CXR 02/24: IMPRESSION:  1  Tiny right apical pneumothorax after recent lung nodule biopsy  Mild soft tissue emphysema also noted right chest wall  2   Right apex nodule corresponding to recent biopsy target      CXR 03/01: No pneumothorax    Incidental Findings:   · N/A    Test Results Pending at Discharge (will require follow up):   · N/A     Outpatient Tests Requested:  · N/A    Complications:  IR biopsy with pneumothorax requiring chest tube placement    Reason for Admission: shortness of breath    Hospital Course:     Melisa Franco is a 74 y o  male patient who originally presented to the hospital on 2/24/2023 with shortness of breath  Patient originally presented with shortness of breath following a lung biopsy on 02/20 with IR for a lung mass that was seen on CT in the setting of bladder cancer  Following the lung biopsy, patient developed shortness of breath and chest pain and came to the ED    Initial imaging studies were indicative of right-sided pneumothorax and IR was originally consulted following which, patient underwent chest tube placement  Patient was also seen by pulmonary during the course of hospitalization for management of the chest tube  Patient had increased oxygen needs following pneumothorax, his oxygen supplementation is gone down significantly and is now on room air  Patient is stable for discharge today and denies any shortness of breath or chest pain  In the future, patient is strongly urged to follow-up with his PCP  Please see above list of diagnoses and related plan for additional information  Condition at Discharge: stable     Discharge Day Visit / Exam:     Subjective:  Seen sitting up in bed, reports of no acute concerns  Discussed recent labs and plans for discharge with the patient today  Patient agreeable with the plan  Vitals: Blood Pressure: 135/70 (03/01/23 1111)  Pulse: 91 (03/01/23 1111)  Temperature: 98 8 °F (37 1 °C) (03/01/23 1111)  Temp Source: Oral (03/01/23 1111)  Respirations: 16 (03/01/23 1111)  Weight - Scale: 53 8 kg (118 lb 9 7 oz) (02/27/23 0533)  SpO2: 98 % (03/01/23 1111)  Exam:   Physical Exam  Vitals reviewed  Constitutional:       Comments: Seen sitting up in bed, has a Port-A-Cath on the right side of chest   HENT:      Head: Normocephalic  Cardiovascular:      Rate and Rhythm: Normal rate and regular rhythm  Pulses: Normal pulses  Heart sounds: Normal heart sounds  Pulmonary:      Effort: Pulmonary effort is normal       Breath sounds: Normal breath sounds  Abdominal:      General: Bowel sounds are normal       Palpations: Abdomen is soft  Skin:     General: Skin is warm and dry  Capillary Refill: Capillary refill takes less than 2 seconds  Neurological:      Mental Status: He is alert and oriented to person, place, and time           Discussion with Family: Plan discussed with patient    Discharge instructions/Information to patient and family:   See after visit summary for information provided to patient and family  Provisions for Follow-Up Care:  See after visit summary for information related to follow-up care and any pertinent home health orders  Disposition:     Home    For Discharges to [facility] SNF:   · Not Applicable to this Patient - Not Applicable to this Patient    Planned Readmission: N/A     Discharge Statement:  I spent 45 minutes discharging the patient  This time was spent on the day of discharge  I had direct contact with the patient on the day of discharge  Greater than 50% of the total time was spent examining patient, answering all patient questions, arranging and discussing plan of care with patient as well as directly providing post-discharge instructions  Additional time then spent on discharge activities  Discharge Medications:  See after visit summary for reconciled discharge medications provided to patient and family          Mitul Acosta MD  Internal Medicine Residency PGY-2  [facility]      ** Please Note: This note has been constructed using a voice recognition system **

## 2023-03-01 NOTE — QUICK NOTE
Notified that patient was experiencing severe pain at the site of his chest tube upon return from CT chest  The chest tube remained sutured in place, but patient had removed dressing and the tube did appear to be migrated out about  1 cm with tension on the suture in comparison to my examination in the morning  There was no crepitus on palpation of the surrounding site  The tube remained clamped  The CT imaging was reviewed and demonstrated resolution of his pneumothorax  Chest tube was tried on water seal without air leak and then suction without air leak or change in symptoms  CXR was performed and did not demonstrate pneumothorax  As his pneumothorax appeared resolved and his pain was likely secondary to chest tube migration, the chest tube was removed in the usual sterile fashion without complication  Due to his severe pain, would observe overnight and repeat CXR in the morning  Case discussed with Dr Betty Christina and Dr Lisette Phillips

## 2023-03-01 NOTE — PLAN OF CARE
Problem: PAIN - ADULT  Goal: Verbalizes/displays adequate comfort level or baseline comfort level  Description: Interventions:  - Encourage patient to monitor pain and request assistance  - Assess pain using appropriate pain scale  - Administer analgesics based on type and severity of pain and evaluate response  - Implement non-pharmacological measures as appropriate and evaluate response  - Consider cultural and social influences on pain and pain management  - Notify physician/advanced practitioner if interventions unsuccessful or patient reports new pain  Outcome: Progressing     Problem: INFECTION - ADULT  Goal: Absence or prevention of progression during hospitalization  Description: INTERVENTIONS:  - Assess and monitor for signs and symptoms of infection  - Monitor lab/diagnostic results  - Monitor all insertion sites, i e  indwelling lines, tubes, and drains  - Monitor endotracheal if appropriate and nasal secretions for changes in amount and color  - [place] appropriate cooling/warming therapies per order  - Administer medications as ordered  - Instruct and encourage patient and family to use good hand hygiene technique  - Identify and instruct in appropriate isolation precautions for identified infection/condition  Outcome: Progressing  Goal: Absence of fever/infection during neutropenic period  Description: INTERVENTIONS:  - Monitor WBC    Outcome: Progressing     Problem: SAFETY ADULT  Goal: Patient will remain free of falls  Description: INTERVENTIONS:  - Educate patient/family on patient safety including physical limitations  - Instruct patient to call for assistance with activity   - Consult OT/PT to assist with strengthening/mobility   - Keep Call bell within reach  - Keep bed low and locked with side rails adjusted as appropriate  - Keep care items and personal belongings within reach  - Initiate and maintain comfort rounds  - Make Fall Risk Sign visible to staff  - Apply yellow socks and bracelet for high fall risk patients  - Consider moving patient to room near nurses station  Outcome: Progressing  Goal: Maintain or return to baseline ADL function  Description: INTERVENTIONS:  -  Assess patient's ability to carry out ADLs; assess patient's baseline for ADL function and identify physical deficits which impact ability to perform ADLs (bathing, care of mouth/teeth, toileting, grooming, dressing, etc )  - Assess/evaluate cause of self-care deficits   - Assess range of motion  - Assess patient's mobility; develop plan if impaired  - Assess patient's need for assistive devices and provide as appropriate  - Encourage maximum independence but intervene and supervise when necessary  - Involve family in performance of ADLs  - Assess for home care needs following discharge   - Consider OT consult to assist with ADL evaluation and planning for discharge  - Provide patient education as appropriate  Outcome: Progressing  Goal: Maintains/Returns to pre admission functional level  Description: INTERVENTIONS:  - Perform BMAT or MOVE assessment daily    - Set and communicate daily mobility goal to care team and patient/family/caregiver     - Collaborate with rehabilitation services on mobility goals if consulted  - Out of bed for toileting  - Record patient progress and toleration of activity level   Outcome: Progressing     Problem: DISCHARGE PLANNING  Goal: Discharge to home or other facility with appropriate resources  Description: INTERVENTIONS:  - Identify barriers to discharge w/patient and caregiver  - Arrange for needed discharge resources and transportation as appropriate  - Identify discharge learning needs (meds, wound care, etc )  - Arrange for interpretive services to assist at discharge as needed  - Refer to Case Management Department for coordinating discharge planning if the patient needs post-hospital services based on physician/advanced practitioner order or complex needs related to functional status, cognitive ability, or social support system  Outcome: Progressing     Problem: Knowledge Deficit  Goal: Patient/family/caregiver demonstrates understanding of disease process, treatment plan, medications, and discharge instructions  Description: Complete learning assessment and assess knowledge base    Interventions:  - Provide teaching at level of understanding  - Provide teaching via preferred learning methods  Outcome: Progressing     Problem: Prexisting or High Potential for Compromised Skin Integrity  Goal: Skin integrity is maintained or improved  Description: INTERVENTIONS:  - Identify patients at risk for skin breakdown  - Assess and monitor skin integrity  - Assess and monitor nutrition and hydration status  - Monitor labs   - Assess for incontinence   - Turn and reposition patient  - Assist with mobility/ambulation  - Relieve pressure over bony prominences  - Avoid friction and shearing  - Provide appropriate hygiene as needed including keeping skin clean and dry  - Evaluate need for skin moisturizer/barrier cream  - Collaborate with interdisciplinary team   - Patient/family teaching  - Consider wound care consult   Outcome: Progressing     Problem: RESPIRATORY - ADULT  Goal: Achieves optimal ventilation and oxygenation  Description: INTERVENTIONS:  - Assess for changes in respiratory status  - Assess for changes in mentation and behavior  - Position to facilitate oxygenation and minimize respiratory effort  - Oxygen administered by appropriate delivery if ordered  - Initiate smoking cessation education as indicated  - Encourage broncho-pulmonary hygiene including cough, deep breathe, Incentive Spirometry  - Assess the need for suctioning and aspirate as needed  - Assess and instruct to report SOB or any respiratory difficulty  - Respiratory Therapy support as indicated  Outcome: Progressing

## 2023-03-01 NOTE — ASSESSMENT & PLAN NOTE
· Pt had biopsy of RUL 2/20 who presents with R sided chest pain  · CXR on admission showed tiny R apical pneumothorax   · Repeat CXR 2/25 moderate-size R hydropneumothorax  · S/p chest tube placement by IR on 2/25  · Weaned to room air  · Pulmonology following, appreciate input, repeat CXR 2/27 AM and possibly remove chest tube at that time if PTX resolved   · CXR from 02/28 with appropriately located pleural catheter in right lung, looking very slightly improved in left lung in comparison to CXR from 02/26  · CT Chest study findings noted

## 2023-03-01 NOTE — CASE MANAGEMENT
Case Management Discharge Planning Note    Patient name Francisca Almodovar  Location [address] [place] [address] [address]40[address]35 MRN [id number]  : 1950 Date 3/1/2023       Current Admission Date: 2023  Current Admission Diagnosis:Controlled diabetes mellitus with diabetic neuropathy, with long-term current use of insulin [facility])   Patient Active Problem List    Diagnosis Date Noted   • Metabolic acidosis    • Palliative care encounter 2022   • Solitary pulmonary nodule 10/20/2022   • Hip pain, acute, right 10/17/2022   • Dementia ([place] [address] ) 10/12/2022   • Pulmonary nodule 10/08/2022   • History of bladder cancer 10/08/2022   • History of liver transplant ([place] [address] ) 10/08/2022   • DM II (diabetes mellitus, type II), controlled ([place] [address] ) 10/08/2022   • Anemia 10/08/2022   • Insomnia 10/08/2022   • History of hydronephrosis 10/08/2022   • Orthostatic hypotension 2022   • Right ankle pain 2022   • Orthostatic hypotension 2022   • Hydronephrosis 2022   • Multiple falls 2022   • Anemia due to chronic kidney disease 2022   • History of bladder cancer 2022   • Controlled type 2 diabetes mellitus with diabetic neuropathy, with long-term current use of insulin ([place] [address] ) 2022   • History of CVA (cerebrovascular accident) 2022   • History of COVID-19 2022   • Hospital discharge follow-up 2022   • Anemia 2022   • Mixed stress and urge urinary incontinence 2022   • Weight loss 2022   • Urethritis 2022   • Port-A-Cath in place 2022   • Malignant neoplasm of trigone of urinary bladder ([facility] ) 06/15/2022   • CKD (chronic kidney disease) 2022   • Generalized weakness 06/10/2022   • Nephrostomy status ([place] [address] ) 06/10/2022   • Bladder spasms 06/10/2022   • Bladder tumor 2022   • Gross hematuria 2022   • Hydronephrosis of right kidney 2022   • Mild cognitive impairment 2021   • Subdural hygroma    • 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 ) • CKD (chronic kidney disease) stage 4, GFR 15-29 ml/min ([facility]) 03/30/2021   • Hand lesion 06/23/2020   • Type 2 diabetes mellitus with diabetic peripheral angiopathy without gangrene, with long-term current use of insulin ([facility] ) 06/13/2019   • Rash 09/11/2018   • Medicare annual wellness visit, subsequent 06/07/2018   • Screening for colon cancer 06/07/2018   • Hepatitis B core antibody positive 09/25/2017   • [facility] 09/20/2017   • History of CVA (cerebrovascular accident) 09/18/2017   • Controlled diabetes mellitus with diabetic neuropathy, with long-term current use of insulin ([place] [address] ) 09/18/2017   • Liver transplant status ([place] [address] ) 09/18/2017   • History of immunosuppression therapy 09/18/2017   • History of hepatitis C 09/18/2017   • Pruritus 08/03/2016   • Spondylosis of cervical region without myelopathy or radiculopathy 02/02/2016   • Headache, chronic daily 09/21/2015   • Vitamin D deficiency 12/16/2014   • Occipital neuralgia 04/08/2014   • Migraine headache 03/31/2014   • BPPV (benign paroxysmal positional vertigo), unspecified laterality 12/31/2013   • Cerebral arterial aneurysm 12/02/2013   • Prurigo nodularis 10/15/2013   • 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   • Erectile dysfunction of non-organic origin 09/11/2013   • History of cirrhosis 07/09/2012   • Insomnia 05/30/2012   • Peripheral neuropathy 05/07/2012      LOS (days): 4  Geometric Mean LOS (GMLOS) (days): 3 10  Days to GMLOS:-0 8     OBJECTIVE:  Risk of Unplanned Readmission Score: 43 41         Current admission status: Inpatient   Preferred Pharmacy:   [address] [place] [address]  [address] [facility] [address]  Phone: [phone] Fax: [address] [place] [address] [place] [address] [place] [address]  Phone: [phone] Fax: [phone]    PATIENT/FAMILY REPORTS NO PREFERRED PHARMACY  No address on file      [facility] [place] - [address] ,  [address] ,  [address] [place] [address]  Phone: [phone] Fax: [phone]    Primary Care Provider: Josué Muller DO    Primary Insurance: [address] REP  Secondary Insurance:     DISCHARGE DETAILS:                                               Would you like to participate in our [address] service program?  : No - Declined    Treatment Team Recommendation: Home  Discharge Destination Plan[de-identified] Home                                         Additional Comments: Chest tube removed and CXR done this am  Plan home with family, no HHC needs

## 2023-03-01 NOTE — ASSESSMENT & PLAN NOTE
Lab Results   Component Value Date    EGFR 26 03/01/2023    EGFR 24 02/28/2023    EGFR 22 02/27/2023    CREATININE 2 31 (H) 03/01/2023    CREATININE 2 48 (H) 02/28/2023    CREATININE 2 63 (H) 02/27/2023   · Estimated Creatinine Clearance: 21 3 mL/min (A) (by C-G formula based on SCr of 2 31 mg/dL (H))    · Cr b/l 2 5  · Cr improving to 2 31, within baseline

## 2023-03-01 NOTE — PROGRESS NOTES
PULMONOLOGY PROGRESS NOTE     Name: Josie Calzada   Age & Sex: 74 y o  male   MRN: [id number]  Unit/Bed#: [facility] 402-01   Encounter: [id number]    PATIENT INFORMATION     Name: Josie Calzada   Age & Sex: 74 y o  male   MRN: [id number]  Hospital Stay Days: 4    ASSESSMENT/PLAN     1  Secondary pneumothorax  - RUL nodule transthoracic biopsy 2/20/23  - Presented with pain and dyspnea 2/24/23  - Initially with small apical pneumothorax that progressed and required IR 8 Fr chest tube placement anteriorly  - Chest tube initially to suction with CXR improvement of PTX  Remained improved, but not resolved on water seal  Chest tube was clamped the evening of 2/26 and had a trace apical ptx  - CT yesterday demonstrated resolution of PTX as did CXR just prior to chest tube removal yesterday (2/28/23)     2  Acute hypoxemic respiratory failure  - Secondary to #1  - Resolved     3  RUL nodule  - RUL 1 5 cm nodule 1/23/23, increased form 0 9 cm 10/14/22  - Biopsied as above, results pending     4  Bladder cancer  - Resection of bladder lesions May 2022 demonstrated invasive bladder cancer  - Did not complete chemotherapy course due to toxicity  - Did complete radiation therapy  - Required right-sided nephrostomy which has since been removed     5  Hx CVA  6  CKD stage IV  7  Alcoholic Cirrhosis s/p liver transplant  - Transplant about 20 years ago  - No evidence of rejection on Prograf  8  DM2 on insulin     Plan:  - Repeat CXR this morning as chest tube was removed yesterday  - Should be good for discharge as long as CXR demonstrates no pneumothorax  - Will arrange pulmonary follow up to ensure oncologic plan is in place once biopsy results  SUBJECTIVE     Patient seen and examined  No acute events overnight  Patient feels well today  Pain is resolved  Appetite is still not great in the morning   ROS otherwise normal      OBJECTIVE     Vitals:    02/28/23 1910 02/28/23 2222 03/01/23 0703 03/01/23 0705   BP:  116/70 106/63 BP Location:  Right arm     Pulse: 88 81 91    Resp:  18 14    Temp:  98 8 °F (37 1 °C)  99 2 °F (37 3 °C)   TempSrc:  Oral  Oral   SpO2: 98% 98% 97%    Weight:          Temperature:   Temp (24hrs), Av 1 °F (37 3 °C), Min:98 8 °F (37 1 °C), Max:99 4 °F (37 4 °C)    Temperature: 99 2 °F (37 3 °C)  Intake & Output:  I/O        0701   0700  0701   0700  0701   0700    P  O  660 180 0    I V  (mL/kg) 365 (6 8)      Total Intake(mL/kg) 1025 (19 1) 180 (3 3) 0 (0)    Urine (mL/kg/hr) 1110 (0 9) 800 (0 6) 150 (1)    Stool 0      Chest Tube 0      Total Output 1110 800 150    Net -85 -620 -150           Unmeasured Stool Occurrence 1 x          Weights:        Body mass index is 21 01 kg/m²  Weight (last 2 days)     Date/Time Weight    23 0533 53 8 (118 61)        Physical Exam  Vitals and nursing note reviewed  Constitutional:       General: He is not in acute distress  Appearance: Normal appearance  He is well-developed  HENT:      Head: Normocephalic and atraumatic  Mouth/Throat:      Mouth: Mucous membranes are moist       Pharynx: No oropharyngeal exudate or posterior oropharyngeal erythema  Eyes:      General: No scleral icterus  Conjunctiva/sclera: Conjunctivae normal    Cardiovascular:      Rate and Rhythm: Normal rate and regular rhythm  Heart sounds: No murmur heard  No friction rub  No gallop  Pulmonary:      Effort: Pulmonary effort is normal  No respiratory distress  Breath sounds: Normal breath sounds  No wheezing, rhonchi or rales  Abdominal:      Palpations: Abdomen is soft  Tenderness: There is no abdominal tenderness  There is no guarding  Musculoskeletal:         General: No swelling  Cervical back: Neck supple  No tenderness  Right lower leg: No edema  Left lower leg: No edema  Lymphadenopathy:      Cervical: No cervical adenopathy  Skin:     General: Skin is warm and dry        Capillary Refill: Capillary refill takes less than 2 seconds  Neurological:      General: No focal deficit present  Mental Status: He is alert and oriented to person, place, and time  Motor: No weakness  Psychiatric:         Mood and Affect: Mood normal        LABORATORY DATA     Labs: I have personally reviewed pertinent reports  Results from last 7 days   Lab Units 03/01/23  0526 02/25/23  0605 02/24/23  1839   WBC Thousand/uL 6 56 4 88 6 30   HEMOGLOBIN g/dL 8 6* 8 4* 8 6*   HEMATOCRIT % 27 4* 27 3* 27 3*   PLATELETS Thousands/uL 118* 94* 105*   NEUTROS PCT % 66  --  71   MONOS PCT % 7  --  8      Results from last 7 days   Lab Units 03/01/23  0526 02/28/23  0429 02/27/23  1611   POTASSIUM mmol/L 4 4 4 3 4 4   CHLORIDE mmol/L 108 108 108   CO2 mmol/L 22 24 23   BUN mg/dL 34* 38* 39*   CREATININE mg/dL 2 31* 2 48* 2 63*   CALCIUM mg/dL 9 0 8 6 8 6       IMAGING & DIAGNOSTIC TESTING     Radiology Results: I have personally reviewed pertinent reports  XR chest portable    Result Date: 2/27/2023  Impression: Unchanged trace right pneumothorax   Workstation performed: [id number]     XR chest portable    Result Date: 2/26/2023  Impression: Right chest tube in place with markedly improved right pneumothorax  Findings marked for immediate notification in this inpatient  Workstation performed: [id number]     XR chest 1 view portable    Result Date: 2/24/2023  Impression: 1  Tiny right apical pneumothorax after recent lung nodule biopsy  Mild soft tissue emphysema also noted right chest wall  2   Right apex nodule corresponding to recent biopsy target  The study was marked in [facility] for immediate notification  Workstation performed: [id number]     XR chest pa & lateral    Result Date: 2/28/2023  Impression: No definitive pneumothorax  Subtle asymmetric right upper lobe density, similar    Workstation performed: [id number]     XR chest pa & lateral    Result Date: 2/25/2023  Impression: Moderate-sized right hydropneumothorax, little changed since 2/24/2023  Workstation performed: [id number]     CT chest wo contrast    Result Date: 2/28/2023  Impression: Enlarging spiculated right upper lobe nodule as well as multiple bilateral small pulmonary nodules consistent with metastases  Small right pleural effusion  Status post right pleural catheter placement without significant pneumothorax identified  Workstation performed: [id number]     IR chest tube placement    Result Date: 2/25/2023  Impression: Impression: Placement of an 8-Montserratian anterior right chest tube for pneumothorax  Workstation performed: [id number]     Other Diagnostic Testing: I have personally reviewed pertinent reports  ACTIVE MEDICATIONS     Current Facility-Administered Medications   Medication Dose Route Frequency   • clopidogrel (PLAVIX) tablet 75 mg  75 mg Oral Daily   • HYDROmorphone (DILAUDID) injection 0 5 mg  0 5 mg Intravenous Q4H PRN   • insulin lispro (HumaLOG) 100 units/mL subcutaneous injection 1-5 Units  1-5 Units Subcutaneous TID AC   • insulin lispro (HumaLOG) 100 units/mL subcutaneous injection 1-5 Units  1-5 Units Subcutaneous HS   • lidocaine (URO-JET) 2 % urethral/mucosal gel 1 application  1 application Urethral Daily PRN   • melatonin tablet 3 mg  3 mg Oral HS   • oxybutynin (DITROPAN) tablet 5 mg  5 mg Oral BID PRN   • oxyCODONE (ROXICODONE) IR tablet 5 mg  5 mg Oral Q6H PRN   • oxyCODONE (ROXICODONE) IR tablet 7 5 mg  7 5 mg Oral Q6H PRN   • tacrolimus (PROGRAF) capsule 1 mg  1 mg Oral Q12H   • tamsulosin (FLOMAX) capsule 0 4 mg  0 4 mg Oral Daily With Dinner   • temazepam (RESTORIL) capsule 7 5 mg  7 5 mg Oral HS PRN   • zolpidem (AMBIEN) tablet 5 mg  5 mg Oral HS PRN       VTE Mechanical Prophylaxis: sequential compression device      Disclaimer: Portions of the record may have been created with voice recognition software   Occasional wrong word or "sound a like" substitutions may have occurred due to the inherent limitations of voice recognition software  Careful consideration should be taken to recognize, using context, where substitutions have occurred      Obrien DO Malvin  Pulmonary/Critical Care Fellowship PGY-V  [facility]

## 2023-03-02 ENCOUNTER — TRANSITIONAL CARE MANAGEMENT (OUTPATIENT)
Dept: FAMILY MEDICINE CLINIC | Facility: CLINIC | Age: 73
End: 2023-03-02

## 2023-03-03 ENCOUNTER — RA CDI HCC (OUTPATIENT)
Dept: OTHER | Facility: HOSPITAL | Age: 73
End: 2023-03-03

## 2023-03-03 NOTE — PROGRESS NOTES
Fany [facility] 75  coding opportunities       Chart reviewed, no opportunity found:   [address]        Patients Insurance     Medicare Insurance: [employer] Advantage

## 2023-03-03 NOTE — UTILIZATION REVIEW
NOTIFICATION OF ADMISSION DISCHARGE   This is a Notification of Discharge from [facility]  Please be advised that this patient has been discharge from our facility  Below you will find the admission and discharge date and time including the patient’s disposition  UTILIZATION REVIEW CONTACT:  Jerry Bernal  Utilization   Network Utilization Review Department  Phone: [phone] x carefully listen to the prompts  All voicemails are confidential   Email: [email or web address]     ADMISSION INFORMATION  PRESENTATION DATE: 2/24/2023  3:58 PM  OBERVATION ADMISSION DATE:   INPATIENT ADMISSION DATE: 2/25/23  2:27 PM   DISCHARGE DATE: 3/1/2023  4:53 PM   DISPOSITION:Home/Self Care    IMPORTANT INFORMATION:  Send all requests for admission clinical reviews, approved or denied determinations and any other requests to dedicated fax number below belonging to the campus where the patient is receiving treatment   List of dedicated fax numbers:  [address] DENIALS (Administrative/Medical Necessity) [phone]   [address] (Maternity/NICU/Pediatrics) [phone]   [facility] [phone]   [facility] [phone]   [facility] [phone]   220 [facility] [phone]   [address] [place] [address] [phone]   [address] [place] [address] 119 [phone]   [facility]  [phone]   405 [facility] [phone] 412 [facility] [address] [facility] [phone]

## 2023-03-06 ENCOUNTER — PATIENT OUTREACH (OUTPATIENT)
Dept: CASE MANAGEMENT | Facility: HOSPITAL | Age: 73
End: 2023-03-06

## 2023-03-06 NOTE — UTILIZATION REVIEW
NOTIFICATION OF ADMISSION DISCHARGE   This is a Notification of Discharge from [facility]  Please be advised that this patient has been discharge from our facility  Below you will find the admission and discharge date and time including the patient’s disposition  UTILIZATION REVIEW CONTACT:  Higinio Singleton  Utilization   Network Utilization Review Department  Phone: [phone] x carefully listen to the prompts  All voicemails are confidential   Email: [email or web address]     ADMISSION INFORMATION  PRESENTATION DATE: 2/24/2023  3:58 PM  OBERVATION ADMISSION DATE:   INPATIENT ADMISSION DATE: 2/25/23  2:27 PM   DISCHARGE DATE: 3/1/2023  4:53 PM   DISPOSITION:Home/Self Care    IMPORTANT INFORMATION:  Send all requests for admission clinical reviews, approved or denied determinations and any other requests to dedicated fax number below belonging to the campus where the patient is receiving treatment   List of dedicated fax numbers:  [address] DENIALS (Administrative/Medical Necessity) [phone]   [address] (Maternity/NICU/Pediatrics) [phone]   [facility] [phone]   [facility] 87 [phone]   [address] [phone]   220 [facility] [phone]   90 [facility] [phone]   1465 [facility] 119 [phone]   [facility]  [phone]   4051 [facility] [phone]   412 [facility] 850 [address] [facility] [phone]

## 2023-03-06 NOTE — PROGRESS NOTES
OSW called Maia Brewster today for supportive outreach  LM on VM with return call information  Will continue to follow

## 2023-03-07 DIAGNOSIS — Z79.4 CONTROLLED TYPE 2 DIABETES MELLITUS WITH DIABETIC NEUROPATHY, WITH LONG-TERM CURRENT USE OF INSULIN (HCC): ICD-10-CM

## 2023-03-07 DIAGNOSIS — E11.40 CONTROLLED TYPE 2 DIABETES MELLITUS WITH DIABETIC NEUROPATHY, WITH LONG-TERM CURRENT USE OF INSULIN (HCC): ICD-10-CM

## 2023-03-07 RX ORDER — BLOOD-GLUCOSE CONTROL, NORMAL
EACH MISCELLANEOUS
Qty: 1 EACH | Refills: 0 | Status: SHIPPED | OUTPATIENT
Start: 2023-03-07

## 2023-03-08 ENCOUNTER — OFFICE VISIT (OUTPATIENT)
Dept: FAMILY MEDICINE CLINIC | Facility: CLINIC | Age: 73
End: 2023-03-08

## 2023-03-08 VITALS
TEMPERATURE: 98.5 F | HEART RATE: 80 BPM | WEIGHT: 122.8 LBS | SYSTOLIC BLOOD PRESSURE: 132 MMHG | BODY MASS INDEX: 21.76 KG/M2 | OXYGEN SATURATION: 98 % | DIASTOLIC BLOOD PRESSURE: 70 MMHG | RESPIRATION RATE: 16 BRPM | HEIGHT: 63 IN

## 2023-03-08 DIAGNOSIS — C67.0 MALIGNANT NEOPLASM OF TRIGONE OF URINARY BLADDER (HCC): ICD-10-CM

## 2023-03-08 DIAGNOSIS — G47.09 OTHER INSOMNIA: ICD-10-CM

## 2023-03-08 DIAGNOSIS — Z09 HOSPITAL DISCHARGE FOLLOW-UP: Primary | ICD-10-CM

## 2023-03-08 DIAGNOSIS — N18.4 CKD (CHRONIC KIDNEY DISEASE) STAGE 4, GFR 15-29 ML/MIN (HCC): Chronic | ICD-10-CM

## 2023-03-08 DIAGNOSIS — E11.51 TYPE 2 DIABETES MELLITUS WITH DIABETIC PERIPHERAL ANGIOPATHY WITHOUT GANGRENE, WITH LONG-TERM CURRENT USE OF INSULIN (HCC): ICD-10-CM

## 2023-03-08 DIAGNOSIS — R91.1 PULMONARY NODULE: ICD-10-CM

## 2023-03-08 DIAGNOSIS — Z79.4 TYPE 2 DIABETES MELLITUS WITH DIABETIC PERIPHERAL ANGIOPATHY WITHOUT GANGRENE, WITH LONG-TERM CURRENT USE OF INSULIN (HCC): ICD-10-CM

## 2023-03-08 PROBLEM — Z86.73 HISTORY OF CVA (CEREBROVASCULAR ACCIDENT): Status: RESOLVED | Noted: 2017-09-18 | Resolved: 2023-01-01

## 2023-03-08 PROBLEM — R31.0 GROSS HEMATURIA: Status: RESOLVED | Noted: 2022-05-23 | Resolved: 2023-01-01

## 2023-03-08 PROBLEM — D64.9 ANEMIA: Status: RESOLVED | Noted: 2022-10-08 | Resolved: 2023-03-08

## 2023-03-08 PROBLEM — D64.9 ANEMIA: Status: RESOLVED | Noted: 2022-10-08 | Resolved: 2023-01-01

## 2023-03-08 PROBLEM — R31.0 GROSS HEMATURIA: Status: RESOLVED | Noted: 2022-05-23 | Resolved: 2023-03-08

## 2023-03-08 PROBLEM — Z86.73 HISTORY OF CVA (CEREBROVASCULAR ACCIDENT): Status: RESOLVED | Noted: 2017-09-18 | Resolved: 2023-03-08

## 2023-03-08 RX ORDER — PREGABALIN 75 MG/1
CAPSULE ORAL
COMMUNITY
Start: 2023-03-03

## 2023-03-08 RX ORDER — ZOLPIDEM TARTRATE 5 MG/1
5 TABLET ORAL
Qty: 30 TABLET | Refills: 3 | Status: SHIPPED | OUTPATIENT
Start: 2023-03-08

## 2023-03-08 NOTE — PROGRESS NOTES
Assessment/Plan:    1  Hospital discharge follow-up    2  Type 2 diabetes mellitus with diabetic peripheral angiopathy without gangrene, with long-term current use of insulin (HCC)    3  Pulmonary nodule  Assessment & Plan:  Biopsy results discussed  Will follw up with heme for further direction      4  Malignant neoplasm of trigone of urinary bladder [facility])  Assessment & Plan:  Seeing urology      5  CKD (chronic kidney disease) stage 4, GFR 15-29 ml/min [facility])  Assessment & Plan:  Lab Results   Component Value Date    EGFR 26 03/01/2023    EGFR 24 02/28/2023    EGFR 22 02/27/2023    CREATININE 2 31 (H) 03/01/2023    CREATININE 2 48 (H) 02/28/2023    CREATININE 2 63 (H) 02/27/2023   improved      6  Other insomnia  -     zolpidem (AMBIEN) 5 mg tablet; Take 1 tablet (5 mg total) by mouth daily at bedtime as needed for sleep            There are no Patient Instructions on file for this visit  No follow-ups on file  Subjective:      Patient ID: Clif Kramer is a 74 y o  male  Chief Complaint   Patient presents with   • Transition of Care Management     Patient is being seen for a TCM visit  Here for ofllow up after hospital stay  Had lung biopsy which then developed pneumothorax  Eating well at home  Biopsy results reviewed with pt      TCM Call     Date and time call was made  3/2/2023  8:25 AM    Hospital care reviewed  Records not available    Patient was hospitialized at  [facility]    Date of Admission  02/24/23    Date of discharge  03/01/23    Diagnosis  Pneumothorax after biopsy    Disposition  Home    Were the patients medications reviewed and updated  No    Current Symptoms  None    Pain with urination severity  Mild    Pain with urination  Gradual      TCM Call     Post hospital issues  None    Should patient be enrolled in anticoag monitoring? Yes    Scheduled for follow up?   Yes    Did you obtain your prescribed medications  Yes    Do you need help managing your prescriptions or medications  No    Is transportation to your appointment needed  No    I have advised the patient to call PCP with any new or worsening symptoms  [place],     Living Arrangements  Spouse or Significiant other    Support System  None    Are you recieving any outpatient services  No    Are you recieving home care services  No    Types of home care services  Nurse visit    Are you using any community resources  No    Current waiver services  No    Have you fallen in the last 12 months  Yes    How many times  2    Interperter language line needed  No    Counseling  Patient        The following portions of the patient's history were reviewed and updated as appropriate: allergies, current medications, past family history, past medical history, past social history, past surgical history and problem list     Review of Systems      Current Outpatient Medications   Medication Sig Dispense Refill   • acetaminophen (TYLENOL) 325 mg tablet Take 3 tablets (975 mg total) by mouth every 8 (eight) hours  0   • Alcohol Swabs (B-D SINGLE USE SWABS REGULAR) PADS USE 2-3 TIMES DAILY AS NEEDED     • Blood Glucose Calibration (OT ULTRA/FASTTK CNTRL SOLN) SOLN USE AS DIRECTED 1 each 0   • clopidogrel (PLAVIX) 75 mg tablet Take 1 tablet (75 mg total) by mouth daily Do not start before January 23, 2023  90 tablet 0   • Comfort EZ Pen Needles 32G X 4 MM MISC USE 3-4 AS DIRECTED 100 each 5   • Continuous Blood Gluc  (FreeStyle Frank 14 Day [place]) NATALIA Use 1 Device continuous 1 each 0   • Continuous Blood Gluc Sensor (FreeStyle Frank 14 Day Sensor) MISC Use 1 Device 4 (four) times a day 1 each 5   • Incontinence Supply Disposable (Incontinence Brief Medium) MISC Use 4 (four) times a day 120 each 0   • insulin detemir (Levemir FlexTouch) 100 Units/mL injection pen Inject 15 Units under the skin daily at bedtime 15 mL 3   • Lancet Devices (Lancing Device) MISC USE AS DIRECTED 1 each 0   • loperamide (IMODIUM) 2 mg capsule Take 1 capsule (2 mg total) by mouth 2 (two) times a day as needed for diarrhea [place] carmen tableta dos veces por cynthia si tiene diarrea 90 capsule 0   • Nutritional Supplements (Glucerna Shake) LIQD Take 1 Bottle by mouth 3 (three) times a day 30667 mL 0   • OneTouch Ultra test strip TEST 3-4 TIMES A  each 4   • oxybutynin (DITROPAN) 5 mg tablet Take 1 tablet (5 mg total) by mouth 2 (two) times a day as needed (bladder spasms) 60 tablet 0   • pregabalin (LYRICA) 75 mg capsule TAKE 1 CAPSULE (75 MG TOTAL) BY MOUTH 2 (TWO) TIMES A DAY FOR 10 DAYS     • rosuvastatin (CRESTOR) 40 MG tablet TAKE ONE (1) TABLET BY MOUTH DAILY 30 tablet 5   • tacrolimus (PROGRAF) 1 mg capsule TAKE 1 CAPSULE (1 MG TOTAL) BY MOUTH EVERY 12 (TWELVE) HOURS 180 capsule 3   • tamsulosin (FLOMAX) 0 4 mg Take 1 capsule (0 4 mg total) by mouth daily with dinner 90 capsule 3   • temazepam (RESTORIL) 7 5 mg capsule TAKE 2 CAPSULES (15 MG TOTAL) BY MOUTH DAILY AT BEDTIME AS NEEDED FOR SLEEP 30 capsule 3   • zolpidem (AMBIEN) 5 mg tablet Take 1 tablet (5 mg total) by mouth daily at bedtime as needed for sleep 30 tablet 3     Current Facility-Administered Medications   Medication Dose Route Frequency Provider Last Rate Last Admin   • lidocaine (URO-JET, GLYDO) 2 % urethral/mucosal gel 1 application  1 application  Urethral Daily PRN Amna Fried MD           Objective:    /70 (BP Location: Left arm, Patient Position: Sitting, Cuff Size: Adult)   Pulse 80   Temp 98 5 °F (36 9 °C) (Tympanic)   Resp 16   Ht 5' 3" (1 6 m)   Wt 55 7 kg (122 lb 12 8 oz)   SpO2 98%   BMI 21 75 kg/m²        Physical Exam  Vitals and nursing note reviewed  Constitutional:       Appearance: Normal appearance  He is well-developed  HENT:      Head: Normocephalic and atraumatic        Right Ear: External ear normal       Left Ear: External ear normal       Nose: Nose normal    Eyes:      General: Lids are normal       Conjunctiva/sclera: Conjunctivae normal  Pupils: Pupils are equal, round, and reactive to light  Cardiovascular:      Rate and Rhythm: Normal rate and regular rhythm  Pulses: Normal pulses  Heart sounds: Normal heart sounds, S1 normal and S2 normal    Pulmonary:      Effort: Pulmonary effort is normal       Breath sounds: Normal breath sounds  Abdominal:      General: Bowel sounds are normal       Palpations: Abdomen is soft  Musculoskeletal:         General: Normal range of motion  Cervical back: Normal range of motion and neck supple  Skin:     General: Skin is warm and dry  Neurological:      General: No focal deficit present  Mental Status: He is alert and oriented to person, place, and time  Deep Tendon Reflexes: Reflexes are normal and symmetric  Psychiatric:         Mood and Affect: Mood normal          Speech: Speech normal          Behavior: Behavior normal          Thought Content:  Thought content normal          Judgment: Judgment normal                 Carolyn Kerr DO

## 2023-03-08 NOTE — ASSESSMENT & PLAN NOTE
Lab Results   Component Value Date    EGFR 26 03/01/2023    EGFR 24 02/28/2023    EGFR 22 02/27/2023    CREATININE 2 31 (H) 03/01/2023    CREATININE 2 48 (H) 02/28/2023    CREATININE 2 63 (H) 02/27/2023   improved

## 2023-03-09 ENCOUNTER — PATIENT OUTREACH (OUTPATIENT)
Dept: CASE MANAGEMENT | Facility: HOSPITAL | Age: 73
End: 2023-03-09

## 2023-03-09 ENCOUNTER — OFFICE VISIT (OUTPATIENT)
Dept: HEMATOLOGY ONCOLOGY | Facility: CLINIC | Age: 73
End: 2023-03-09

## 2023-03-09 VITALS
RESPIRATION RATE: 14 BRPM | SYSTOLIC BLOOD PRESSURE: 124 MMHG | DIASTOLIC BLOOD PRESSURE: 76 MMHG | BODY MASS INDEX: 21.62 KG/M2 | HEART RATE: 76 BPM | OXYGEN SATURATION: 99 % | HEIGHT: 63 IN | TEMPERATURE: 97.1 F | WEIGHT: 122 LBS

## 2023-03-09 DIAGNOSIS — C67.9 BLADDER CANCER METASTASIZED TO LUNG (HCC): Primary | ICD-10-CM

## 2023-03-09 DIAGNOSIS — C78.00 BLADDER CANCER METASTASIZED TO LUNG (HCC): Primary | ICD-10-CM

## 2023-03-09 DIAGNOSIS — C67.0 MALIGNANT NEOPLASM OF TRIGONE OF URINARY BLADDER (HCC): ICD-10-CM

## 2023-03-09 NOTE — PROGRESS NOTES
Stephanie Dominguez returned call to OSW today  She shared Zamzam Meeks has been diagnosed with "4 stage cancer" and has been found to have cancer on his lungs  She has been speaking with him about not pursuing any further treatment and to focus on spending time with family  They are discussing a possible trip to [facility] to visit [place] and [place]  They went in 2021 and he had a wonderful time  OSW and Stephanie Dominguez talked about quality of life and enjoying things while he is able to participate and feel relatively well  Gustaboherson Angelica requested another call next week for support  They are going to talk about his diagnosis with some family members and she would like to talk again if possible  Will place on schedule for outreach

## 2023-03-09 NOTE — PROGRESS NOTES
Hematology/Oncology Outpatient Follow- up Note  Elroy Torrez 74 y o  male MRN: @ Encounter: [id number]        Date:  3/9/2023      Assessment / Plan:    66-year-old  male with complicated medical history including alcoholic cirrhosis of the liver, with subsequent liver transplant in 2010, hepatitis-C status post treatment, diabetes mellitus type 2, neuropathy, chronic kidney disease secondary to tacrolimus, coronary artery disease, nephrolithiasis was found to have gross hematuria in May 2022, cystoscopies showed at least 5 cm mass involving the right lateral posterior wall, ureterovesical junction on the trigone, could not be stented, status post right nephrostomy tube     Biopsy 5/26/22 showed 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   CT scan did not show any local or distant metastasis  Because of liver transplant he is not a good candidate for immunotherapy  He was treated with concurrent radiation therapy with gemcitabine 27 mg per m2 twice weekly x 2 weeks 9/04 - 1/4/26 complicated with dysuria, fatigue  After long discussion the patient and his wife decided not to proceed with any additional gemcitabine  8/25/22 He completed radiation therapy with 36 fractions to bladder and pelvis     10/8/22 RUL solid pulmonary nodule new since 5/23/2021, suspicious for pulmonary metastases  Discussed possibility of biopsy for confirmation,  Evaluation with radiation oncology for possible SBRT  Patient elected to observe  CT scan on 1/2023 showed increasing in the right upper lobe lung nodule 1 3 cm, few bilateral pulmonary nodules consistent with metastatic disease  2/23 underwent pulmonary nodule biopsy, complicated by hydropneumothorax  Pathology showed metastatic disease, histologically similar to his bladder tumor  Had a rodolfo discussion with patient and his wife that this represents stage IV, metastatic bladder cancer    Patient was unable to tolerate low-dose Gemzar with radiation therapy in the past   He would likely tolerate chemotherapy poorly  He states he would like to try chemotherapy, however  His wife, Patricia Stover, knowing how he tolerated treatment previously would rather he not pursue treatment  During the visit, we discussed the possibility of Taxotere 25 mg per metered squared 3 weeks on 1 week off  Discussed that he would need to have weekly blood count assessments  Potential side effects could include but may not be limited to:   infections, cytopenias, febrile neutropenia, hypersensitivity, neuropathy, dysgeusia, dyspnea, constipation, anorexia, nail disorders, fluid retention, asthenia, pain, nausea, diarrhea, vomiting, mucositis, alopecia, skin reactions, myalgia  Questions asked and answered  Signed informed consent obtained, however, patient and his wife wish to discuss further over the weekend  3/13/23  Called patient  Spoke with wife, Patricia Stover  They wish to arrange f/u appointment and have his daughter attend to discuss options, pros/ cons of treatment  HPI:    68-year-old  male seen initially 6/14/22 regarding Muscle invasive bladder cancer  He has a complicated medical history including history of hepatitis-C, cirrhosis of the liver with subsequent liver transplant in 2010, diabetes mellitus type 2, diabetic neuropathy, benign paroxysmal positional vertigo, subdural hygroma, chronic kidney disease grade 3-4, history of previous alcoholism  Presented with gross hematuria in May 2022  CT scan of the abdomen and pelvis showed moderate right-sided hydroureteronephrosis with ureteral calculus seen, bladder mass measuring 7 4 x 6 6 cm was possible soft tissue extension into the right ureterovesical junction  Status post cystoscopy with 5 cm mass involving the right side as well as the trigone unable to stand the right-sided hydronephrosis status post nephrostomy tube       Biopsy showed 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, he was not candidate for surgical resection because of comorbidities, liver transplant as well as advanced age  Because of liver transplant he is not a good candidate for immunotherapy  The patient felt to be candidate for concurrent radiation with low-dose gemcitabine  He was treated with concurrent radiation therapy with gemcitabine 27 mg per m2 twice weekly x 2 weeks 6/48 - 0/1/74 complicated with dysuria, fatigue  After long discussion the patient and his wife decided not to proceed with any additional gemcitabine finished radiation therapy on 8/25/2022     CAT scan on 1/10/2022 showed 9 mm right upper lobe nodule status post right percutaneous nephroureteral stent       1/18/23 right trigone bladder lesion -   Right trigone bladder lesion:     - Partially denuded urothelium and underlying fibromuscular stroma with chronic lymphoplasmacytic inflammation and histologic changes compatible with prior biopsy      - No carcinoma identified     CT scan on January 23, 2023 showed increasing in the right upper lobe lung nodule to 1 3 cm, few new pulmonary nodules bilaterally measuring millimeters      Interval History:        2/20/23 RUL nodule biospy - Poorly differentiated carcinoma, the tumor is favored to represent a metastasis from the patient's known high-grade urothelial carcinoma, [id number], which appears histologically similar  2/24 - 3/1/23 admitted to the hospital with right-sided chest pain  Hydropneumothorax identified  He underwent chest tube placement February 25      Review of Systems   Constitutional: Positive for fatigue  Negative for appetite change, chills, diaphoresis, fever and unexpected weight change  HENT:   Negative for mouth sores, nosebleeds, sore throat, tinnitus and voice change  Eyes: Negative for eye problems  Respiratory: Negative for chest tightness, cough, shortness of breath and wheezing  Cardiovascular: Negative for chest pain, leg swelling and palpitations  Gastrointestinal: Negative for abdominal distention, abdominal pain, blood in stool, constipation, diarrhea, nausea, rectal pain and vomiting  Endocrine: Negative for hot flashes  Genitourinary: Negative  Musculoskeletal: Negative for gait problem and myalgias  Skin: Negative for itching and rash  Neurological: Negative for dizziness, gait problem, headaches, light-headedness and numbness  Hematological: Negative for adenopathy  Psychiatric/Behavioral: Negative for confusion and sleep disturbance  The patient is not nervous/anxious  Test Results:        Labs:   Lab Results   Component Value Date    HGB 8 6 (L) 03/01/2023    HCT 27 4 (L) 03/01/2023    MCV 76 (L) 03/01/2023     (L) 03/01/2023    WBC 6 56 03/01/2023    NRBC 0 03/01/2023     Lab Results   Component Value Date     12/17/2015    K 4 4 03/01/2023     03/01/2023    CO2 22 03/01/2023    ANIONGAP 13 12/17/2015    BUN 34 (H) 03/01/2023    CREATININE 2 31 (H) 03/01/2023    GLUCOSE 179 (H) 08/05/2022    GLUF 54 (L) 02/25/2023    CALCIUM 9 0 03/01/2023    CORRECTEDCA 10 1 12/01/2022    AST 27 12/01/2022    ALT 22 12/01/2022    ALKPHOS 75 12/01/2022    PROT 7 7 12/15/2015    BILITOT 0 51 12/15/2015    EGFR 26 03/01/2023           Imaging: XR chest portable    Result Date: 3/2/2023  Narrative: CHEST INDICATION:   chest tube, pain  COMPARISON:  CXR 2/27/2023 and chest CT 2/28/2023  EXAM PERFORMED/VIEWS:  XR CHEST PORTABLE FINDINGS:  Right port at cavoatrial junction  Cardiomediastinal silhouette appears unremarkable  Right pigtail catheter  Right upper lobe nodule better shown on CT  No effusion or pneumothorax  Sutures in left humeral head  Impression: Right pigtail with no pneumothorax  Right upper lobe nodule better shown on CT   Workstation performed: [id number]     XR chest portable    Result Date: 3/1/2023  Narrative: CHEST INDICATION: Chest tube removal  Resolution of pneumothorax  COMPARISON:  2/20/2023 EXAM PERFORMED/VIEWS:  XR CHEST PORTABLE FINDINGS: Absent right chest tube  No pneumothorax  Right Mediport remains  Cardiomediastinal silhouette appears unremarkable  The lungs are clear  No pleural effusion  Left humeral head anchors again noted  Right rib old trauma  Impression: No pneumothorax Workstation performed: [id number]     XR chest portable    Result Date: 2/27/2023  Narrative: CHEST INDICATION:   Clamped chest tube  COMPARISON:  Chest radiograph 2/26/2023 EXAM PERFORMED/VIEWS:  XR CHEST PORTABLE FINDINGS:  Tip of right chest wall PowerPort projects over the superior cavoatrial junction  Cardiomediastinal silhouette appears unremarkable  Right pleural catheter  Unchanged trace right apical pneumothorax  No focal consolidation or pleural effusion  Left humeral head suture anchors  Impression: Unchanged trace right pneumothorax   Workstation performed: [id number]     XR chest portable    Result Date: 2/26/2023  Narrative: CHEST INDICATION:   eval pneumothorax  COMPARISON:  Chest radiograph February 25, 2023  EXAM PERFORMED/VIEWS:  XR CHEST PORTABLE FINDINGS:  Pigtail small caliber right chest tube overlying lateral mid thorax  Right IJ venous infusion device terminating in right atrium portable above the right main bronchus  Cardiomediastinal silhouette appears unremarkable  The lungs are clear with the exception of minimal linear atelectasis or scar in the left lung base  Small right apical pneumothorax significantly improved since the comparison study  14 mm between apical pleural line and thoracic apex  Orthopedic anchors left humeral head  Otherwise unremarkable for age  Impression: Right chest tube in place with markedly improved right pneumothorax  Findings marked for immediate notification in this inpatient   Workstation performed: [id number]     XR chest 1 view portable    Result Date: 2/24/2023  Narrative: CHEST INDICATION:   SOB, right sided chest pain  COMPARISON:  10/8/2022 EXAM PERFORMED/VIEWS:  XR CHEST PORTABLE FINDINGS:  Right-sided chest port noted  Cardiomediastinal silhouette appears unremarkable  Right pulmonary nodular density right apex corresponding to recent biopsy  Small right apex pneumothorax and right chest wall emphysema  Mild dependent changes noted right lung base  Tenting right lung base  Osseous structures appear within normal limits for patient age  Impression: 1  Tiny right apical pneumothorax after recent lung nodule biopsy  Mild soft tissue emphysema also noted right chest wall  2   Right apex nodule corresponding to recent biopsy target  The study was marked in [facility] for immediate notification  Workstation performed: [id number]     XR chest pa & lateral    Result Date: 2/28/2023  Narrative: CHEST INDICATION:  Follow-up pneumothorax  COMPARISON:  2/27/2023, CT chest, abdomen and pelvis 1/23/2023 EXAM PERFORMED/VIEWS:  XR CHEST PA & LATERAL FINDINGS: Cardiomediastinal silhouette appears unremarkable  Right chest wall Port-A-Cath  Pigtail right thoracostomy tube with no discernible pneumothorax  Subtle asymmetric right upper lobe density, similar  Linear subsegmental atelectasis left base  Suture anchors left humeral head  Right ureteral stent incidentally noted  Impression: No definitive pneumothorax  Subtle asymmetric right upper lobe density, similar  Workstation performed: [id number]     XR chest pa & lateral    Result Date: 2/25/2023  Narrative: CHEST INDICATION:   check pneumothorax  40-year-old male with right upper lobe pulmonary nodules, status post CT-guided biopsy on 2/20/2023  COMPARISON:  Portable chest from February 24, 2023  CT of the chest, abdomen and pelvis from January 23, 2023  EXAM PERFORMED/VIEWS:  XR CHEST PA & LATERAL  The frontal view was performed utilizing dual energy radiographic technique   FINDINGS:  Right-sided Port-A-Cath present with tip in right atrium  Cardiomediastinal silhouette appears unremarkable  Moderate-sized right hydropneumothorax, approximately 30-40%, not appreciably changed in size since yesterday's portable chest radiograph  Subsegmental atelectasis in the base of the right middle and lower lobes  1 2 cm right upper lobe nodule    Osseous structures appear within normal limits for patient age  Prior distal right clavicular resection  Small amount of soft tissue emphysema in the right lateral chest wall  Impression: Moderate-sized right hydropneumothorax, little changed since 2/24/2023  Workstation performed: [id number]     CT chest wo contrast    Result Date: 2/28/2023  Narrative: CT CHEST WITHOUT IV CONTRAST INDICATION:   Pneumothorax evaluation  Right upper lobe nodule biopsy  History of bladder cancer  Former smoker  COMPARISON:  CT scan from 1/23/2023  TECHNIQUE: CT examination of the chest was performed without intravenous contrast  Axial, sagittal, and coronal 2D reformatted images were created from the source data and submitted for interpretation  Radiation dose length product (DLP) for this visit:  166 55 mGy-cm   This examination, like all CT scans performed in the [facility], was performed utilizing techniques to minimize radiation dose exposure, including the use of iterative  reconstruction and automated exposure control  FINDINGS: LUNGS:  Enlarging spiculated right upper lobe nodule measuring 1 9 x 1 5 cm (previous 1 4 x 1 1 cm)  Additional pulmonary nodules noted in the right upper lobe including a 4 mm right upper lobe nodule image 3/53 (previous 3 mm)  Right upper lobe nodule  measuring 6 mm on image 3/63 (previous 4 mm)  Enlarging nodule in the left upper lobe measuring 4 mm on image 3/71 (previous 2 mm)  Minimal atelectasis at the right lung base  No endotracheal or endobronchial lesion identified  PLEURA:  Small right pleural effusion    Pigtail catheter in the anterior right pleural space without residual pneumothorax identified  HEART/GREAT VESSELS: Heart is unremarkable for patient's age  No thoracic aortic aneurysm  MEDIASTINUM AND JOHN:  No pathological lymphadenopathy  CHEST WALL AND LOWER NECK:  Right chest port unchanged  Mild gynecomastia bilaterally  Minimal air in the right chest wall related to pleural catheter  VISUALIZED STRUCTURES IN THE UPPER ABDOMEN:  Partially imaged right upper pole hydronephrosis  Liver transplant appears unremarkable  Gallbladder has been removed  Visualized portions of the upper abdomen appear otherwise unremarkable  OSSEOUS STRUCTURES:  No acute fracture or destructive osseous lesion  Multiple old right rib fractures  Impression: Enlarging spiculated right upper lobe nodule as well as multiple bilateral small pulmonary nodules consistent with metastases  Small right pleural effusion  Status post right pleural catheter placement without significant pneumothorax identified  Workstation performed: [id number]     IR biopsy lung    Result Date: 2/20/2023  Narrative: IMAGE-GUIDED BIOPSY Indication: Enlarging right upper lobe lung nodule  History of bladder cancer  Procedure: In the supine position, a suitable location for biopsy was identified with CT   The site and surrounding skin were prepped and draped in sterile fashion  1% lidocaine was used for local anesthetic and conscious sedation was administered  Using  CT guidance, a 17 gauge introducer needle was advanced into anterior right upper lobe lung nodule using a anterior approach   Core biopsy was performed with 2 passes made coaxially with a 18 gauge Biopince biopsy device  Specimens were submitted to on-site cytology  The introducer needle was removed   The puncture site was cleansed and a dressing was applied   This examination, like all CT scans performed in the [facility], was performed utilizing techniques to minimize radiation dose exposure, including the use of iterative reconstruction and automated exposure control  Sedation time: 30 minutes Comparison: CT January 23, 2023 Findings: Diagnostic material was identified preliminarily based on touch prep   2 solid core samples were obtained  There was moderate intraparenchymal hemorrhage surrounding the nodule after the 2nd pass  Patient was also coughing indicative of subclinical hemoptysis  Additional passes were therefore not made  Impression: Impression: Technically successful image-guided biopsy of enlarged right upper lobe lung nodule Workstation performed: [id number]     IR chest tube placement    Result Date: 2/25/2023  Narrative: IR CT guided chest tube placement Clinical History: Right-sided hydropneumothorax  Local anesthesia Technique: This examination, like all CT scans performed in the [facility], was performed utilizing techniques to minimize radiation dose exposure, including the use of iterative reconstruction and automated exposure control  The patient was brought to the interventional radiology area and placed supine on the CT scan table   Axial images through the region of interest were obtained  The skin was then marked, prepped, and draped in usual sterile fashion  Lidocaine was administered to the skin and a small skin incision was made  A 19 gauge needle was advanced into the right pleural space  A 035 wire was advanced through the needle, and the needle was removed  After tract dilatation, 8 Guyanese all-purpose drainage catheter was advanced, and the loop formed in the pleural space  The catheter was connected to an Atrium  Follow-up imaging demonstrated appropriate positioning of the 8-Guyanese pigtail right-sided chest tube  The patient tolerated the procedure well and suffered no complications  FINDINGS: CT chest demonstrated moderate sized right pneumothorax  Interval CT demonstrated wire within the right pneumothorax   Completion CT demonstrated newly placed 8-[place]er pigtail right chest tube in appropriate position  Impression: Impression: Placement of an 8-[place]er anterior right chest tube for pneumothorax  Workstation performed: [id number]             Allergies: Allergies   Allergen Reactions   • Tequin [Gatifloxacin] Rash   • Lipitor [Atorvastatin] Hives and Other (See Comments)     Rash and itching   • Ciprofloxacin Rash   • Iohexol Hives and Other (See Comments)     Contraindication with rosuvastatin  • Iv Contrast [Iodinated Contrast Media] Rash and Other (See Comments)     Unknown   • Levofloxacin Rash     Current Medications: Reviewed  PMH/FH/SH:  Reviewed      Physical Exam:    1 57 meters squared    Ht Readings from Last 3 Encounters:   03/09/23 5' 3" (1 6 m)   03/08/23 5' 3" (1 6 m)   03/02/23 5' 3" (1 6 m)        Wt Readings from Last 3 Encounters:   03/09/23 55 3 kg (122 lb)   03/08/23 55 7 kg (122 lb 12 8 oz)   02/27/23 53 8 kg (118 lb 9 7 oz)        Temp Readings from Last 3 Encounters:   03/09/23 (!) 97 1 °F (36 2 °C)   03/08/23 98 5 °F (36 9 °C) (Tympanic)   03/01/23 98 °F (36 7 °C) (Oral)        BP Readings from Last 3 Encounters:   03/09/23 124/76   03/08/23 132/70   03/01/23 133/75             Physical Exam  Vitals reviewed  Constitutional:       General: He is not in acute distress  Appearance: He is well-developed  He is ill-appearing  He is not diaphoretic  HENT:      Head: Normocephalic and atraumatic  Eyes:      Conjunctiva/sclera: Conjunctivae normal    Neck:      Trachea: No tracheal deviation  Cardiovascular:      Rate and Rhythm: Normal rate and regular rhythm  Heart sounds: No murmur heard  No friction rub  No gallop  Pulmonary:      Effort: Pulmonary effort is normal  No respiratory distress  Breath sounds: Normal breath sounds  No wheezing or rales  Chest:      Chest wall: No tenderness  Abdominal:      General: There is no distension  Palpations: Abdomen is soft  Tenderness:  There is no abdominal tenderness  Musculoskeletal:      Cervical back: Normal range of motion and neck supple  Lymphadenopathy:      Cervical: No cervical adenopathy  Skin:     General: Skin is warm and dry  Coloration: Skin is not pale  Findings: No erythema  Neurological:      Mental Status: He is alert and oriented to person, place, and time  Psychiatric:         Behavior: Behavior normal          Thought Content:  Thought content normal          Judgment: Judgment normal          ECO    Emergency Contacts:    Extended Emergency Contact Information  Primary Emergency Contact: [address] Phone: [phone]  Mobile Phone: [phone]  Relation: Significant Other  Secondary Emergency Contact: [address] [place] [address]  Mobile Phone: [phone]  Relation: Daughter

## 2023-03-14 DIAGNOSIS — E78.2 MIXED HYPERLIPIDEMIA: ICD-10-CM

## 2023-03-14 RX ORDER — ROSUVASTATIN CALCIUM 40 MG/1
TABLET, COATED ORAL
Qty: 30 TABLET | Refills: 5 | Status: SHIPPED | OUTPATIENT
Start: 2023-03-14

## 2023-03-16 ENCOUNTER — PATIENT OUTREACH (OUTPATIENT)
Dept: CASE MANAGEMENT | Facility: HOSPITAL | Age: 73
End: 2023-03-16

## 2023-03-16 NOTE — PROGRESS NOTES
OSW called Rosibel today  She shared they made an appointment on Monday to meet with medical oncology because Mohan Power has some questions  Ailyn Mendoza stated she explained to him the cancer treatments were very hard on him, and since he has stopped, he is eating well, exercising more and having better quality of life  She spoke with his other children from previous relationship and his sisters about his diagnosis and prognosis  She stated his children are in agreement with her  His son visited over the weekend and they talked about going to [facility] as a family possibly in the summer or fall  Ailyn Mendoza is concerned about their daughter Kortney Junior who has an intellectual disability  She is very close with Mohan  and doesn't understand the complexity of his cancer and prognosis  Ailyn Mendoza also feels she cannot cry or show her sadness around [address] for fear of upsetting them  She is considering taking Crystal to therapy to start the process of explaining to her her father's condition  OSW agreed with her  She talked about her strong Jainism vlad in [facility]  Offered to meet Mohan Power and Sixtoradha Reji on Monday  She is appreciative  She hopes Mohan Ruelasenzo may talk about his feelings at this visit  Placed pt on OSW schedule for Monday, 3/20

## 2023-03-20 ENCOUNTER — TELEPHONE (OUTPATIENT)
Dept: SURGICAL ONCOLOGY | Facility: CLINIC | Age: 73
End: 2023-03-20

## 2023-03-20 ENCOUNTER — OFFICE VISIT (OUTPATIENT)
Dept: HEMATOLOGY ONCOLOGY | Facility: CLINIC | Age: 73
End: 2023-03-20

## 2023-03-20 ENCOUNTER — PATIENT OUTREACH (OUTPATIENT)
Dept: CASE MANAGEMENT | Facility: HOSPITAL | Age: 73
End: 2023-03-20

## 2023-03-20 VITALS
DIASTOLIC BLOOD PRESSURE: 75 MMHG | SYSTOLIC BLOOD PRESSURE: 125 MMHG | OXYGEN SATURATION: 100 % | HEART RATE: 75 BPM | WEIGHT: 124 LBS | BODY MASS INDEX: 21.97 KG/M2 | HEIGHT: 63 IN | RESPIRATION RATE: 18 BRPM

## 2023-03-20 DIAGNOSIS — C67.9 BLADDER CANCER METASTASIZED TO LUNG (HCC): Primary | ICD-10-CM

## 2023-03-20 DIAGNOSIS — C78.00 BLADDER CANCER METASTASIZED TO LUNG (HCC): Primary | ICD-10-CM

## 2023-03-20 NOTE — PROGRESS NOTES
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  Continue follow up with oncology    Hx of Alcohol cirrhosis with subsequent liver transplant  Continue Prograf        Diagnoses and all orders for this visit:    Pulmonary nodule  -     albuterol (Ventolin HFA) 90 mcg/act inhaler; Inhale 2 puffs every 6 (six) hours as needed for wheezing or shortness of breath        Return if symptoms worsen or fail to improve  History of Present Illness   HPI:  America Snell is a 74 y o  male with past medical history of alcoholic cirrhosis with subsequent liver transplant in 2010, type 2 diabetes, CKD stage IV and stage 4 poorly differentiated carcinoma of bladder who presents for hospital follow up after hospitalization for postprocedural pneumothorax from 2/24-3/1  Patient had an IR biopsy of lung mass on 2/20  He subsequently felt short of breath and presents to the ED, where he was found to have a right-sided pneumothorax  A chest tube was placed until pneumothorax resolved  He initially required oxygen supplementation during hospitalization but was able to be weaned off prior to discharge  Currently he states he feels mostly back to his baseline  He only feels short of breath when going up stairs  Otherwise denies CP, lightheadedness, SOB at rest or minimal exertion  Review of Systems   Constitutional: Negative for activity change, chills, diaphoresis, fatigue and fever  HENT: Negative for congestion, postnasal drip, rhinorrhea, sinus pressure, sinus pain and sore throat  Eyes: Negative      Respiratory: Negative for cough, chest tightness and shortness of breath  Cardiovascular: Negative for chest pain, palpitations and leg swelling  Gastrointestinal: Negative for abdominal distention, abdominal pain, constipation, diarrhea, nausea and vomiting  Endocrine: Negative  Genitourinary: Negative  Musculoskeletal: Negative for back pain, gait problem and neck pain  Skin: Negative  Allergic/Immunologic: Negative  Neurological: Negative for dizziness, syncope, weakness, light-headedness and headaches  Hematological: Negative  Psychiatric/Behavioral: Negative  All other systems reviewed and are negative        Historical Information   Past Medical History:   Diagnosis Date   • Acute urinary retention 09/12/2022   • Alcoholism ([facility] )    • Anemia    • Bladder cancer ([facility] )    • Cerebral artery aneurysm    • Change in mental state     last assessed 5/18/15; resolved 10/27/15   • Chronic kidney disease    • COVID-19     2022 not hospitalized fully recovered   • Diabetes mellitus ([facility] )    • Drug dependence ([facility] )    • Fatigue     last assessed 1/26/15; resolved 5/24/16   • Hepatitis C    • Hospital discharge follow-up 12/21/2018   • Hx of liver transplant [facility])    • Hydronephrosis of right kidney    • Kidney disease    • 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[facility] )    • Liver cirrhosis ([place] [address] )    • Liver transplant recipient [facility])    • Renal disorder    • Stroke (cerebrum) ([facility] )    • Stroke ([facility] )     diff short term memory   • Subdural hygroma     2/27/14; resolved 7/28/15   • Thrombocytopenia ([facility] ) 09/20/2017     Past Surgical History:   Procedure Laterality Date   • BRAIN SURGERY  02/12/2014    left frontotemporal 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   • CATARACT EXTRACTION     • CHOLECYSTECTOMY     • CYSTOSCOPY  11/30/2022    [place]   • FL LUMBAR PUNCTURE DIAGNOSTIC  12/14/2018   • FL RETROGRADE PYELOGRAM  1/18/2023   • HEPATITIS B SURFACE AB QN,LIVER TRANSPLANT (HISTORICAL)     • IR BIOPSY LUNG  2/20/2023   • IR CHEST TUBE PLACEMENT  2/25/2023   • IR NEPHROSTOMY TUBE CHECK/CHANGE/REPOSITION/REINSERTION/UPSIZE  07/29/2022   • IR NEPHROSTOMY TUBE CHECK/CHANGE/REPOSITION/REINSERTION/UPSIZE  08/31/2022   • IR NEPHROSTOMY TUBE CHECK/CHANGE/REPOSITION/REINSERTION/UPSIZE  10/07/2022   • IR NEPHROSTOMY TUBE CHECK/CHANGE/REPOSITION/REINSERTION/UPSIZE  12/3/2022   • IR NEPHROSTOMY TUBE PLACEMENT  05/28/2022   • IR PICC LINE  12/14/2018   • IR PORT PLACEMENT  06/23/2022   • LIVER TRANSPLANTATION     • NEPHROSTOMY TUBE CHANGE N/A 1/18/2023    Procedure: Removal of  percutaneous nephroureteral catheter;  Surgeon: Tracey Ivey MD;  Location: [facility];  Service: Urology   • LA CYSTO BLADDER W/URETERAL CATHETERIZATION Right 1/18/2023    Procedure: URETHRAL DILATION, CYSTOGRAM, CYSTOSCOPY, RIGHT RETEROGRADE PYELOGRAM, [address], COMPLEX CHICAS PLACEMENT;  Surgeon: Tracey Ivey MD;  Location: [facility];  Service: Urology   • ROTATOR CUFF REPAIR     • SHOULDER SURGERY     • TRANSURETHRAL RESECTION OF BLADDER TUMOR N/A 05/26/2022    Procedure: TRANSURETHRAL RESECTION OF BLADDER TUMOR (TURBT);   Surgeon: Tracey Ivey MD;  [facility]: [facility];  Service: Urology     Family History   Problem Relation Age of Onset   • Hypertension Mother         benign essential    • Lung cancer Father    • Diabetes Sister    • Cancer Brother    • Stroke Other         cva - due to embolism of cerebra artery          Meds/Allergies     Current Outpatient Medications:   •  acetaminophen (TYLENOL) 325 mg tablet, Take 3 tablets (975 mg total) by mouth every 8 (eight) hours, Disp: , Rfl: 0  •  Alcohol Swabs (B-D SINGLE USE SWABS REGULAR) PADS, USE 2-3 TIMES DAILY AS NEEDED, Disp: , Rfl:   •  Blood Glucose Calibration (OT ULTRA/FASTTK CNTRL SOLN) SOLN, USE AS DIRECTED, Disp: 1 each, Rfl: 0  •  clopidogrel (PLAVIX) 75 mg tablet, Take 1 tablet (75 mg total) by mouth daily Do not start before January 23, 2023 , Disp: 90 tablet, Rfl: 0  •  Comfort EZ Pen Needles 32G X 4 MM MISC, USE 3-4 AS DIRECTED, Disp: 100 each, Rfl: 5  •  Continuous Blood Gluc  (FreeStyle Frank 14 Day [place]) NATALIA, Use 1 Device continuous, Disp: 1 each, Rfl: 0  •  Incontinence Supply Disposable (Incontinence Brief Medium) MISC, Use 4 (four) times a day, Disp: 120 each, Rfl: 0  •  insulin detemir (Levemir FlexTouch) 100 Units/mL injection pen, Inject 15 Units under the skin daily at bedtime, Disp: 15 mL, Rfl: 3  •  Lancet Devices (Lancing Device) MISC, USE AS DIRECTED, Disp: 1 each, Rfl: 0  •  loperamide (IMODIUM) 2 mg capsule, Take 1 capsule (2 mg total) by mouth 2 (two) times a day as needed for diarrhea Washta carmen tableta dos veces por cynthia si tiene diarrea, Disp: 90 capsule, Rfl: 0  •  Nutritional Supplements (Glucerna Shake) LIQD, Take 1 Bottle by mouth 3 (three) times a day, Disp: 20754 mL, Rfl: 0  •  OneTouch Ultra test strip, TEST 3-4 TIMES A DAY, Disp: 100 each, Rfl: 4  •  oxybutynin (DITROPAN) 5 mg tablet, Take 1 tablet (5 mg total) by mouth 2 (two) times a day as needed (bladder spasms), Disp: 60 tablet, Rfl: 0  •  pregabalin (LYRICA) 75 mg capsule, TAKE 1 CAPSULE (75 MG TOTAL) BY MOUTH 2 (TWO) TIMES A DAY FOR 10 DAYS, Disp: , Rfl:   •  rosuvastatin (CRESTOR) 40 MG tablet, TAKE ONE (1) TABLET BY MOUTH DAILY, Disp: 30 tablet, Rfl: 5  •  tacrolimus (PROGRAF) 1 mg capsule, TAKE 1 CAPSULE (1 MG TOTAL) BY MOUTH EVERY 12 (TWELVE) HOURS, Disp: 180 capsule, Rfl: 3  •  tamsulosin (FLOMAX) 0 4 mg, Take 1 capsule (0 4 mg total) by mouth daily with dinner, Disp: 90 capsule, Rfl: 3  •  temazepam (RESTORIL) 7 5 mg capsule, TAKE 2 CAPSULES (15 MG TOTAL) BY MOUTH DAILY AT BEDTIME AS NEEDED FOR SLEEP, Disp: 30 capsule, Rfl: 3  •  zolpidem (AMBIEN) 5 mg tablet, Take 1 tablet (5 mg total) by mouth daily at bedtime as needed for sleep, Disp: 30 tablet, Rfl: 3    Current Facility-Administered Medications:   •  lidocaine (URO-JET, GLYDO) 2 % urethral/mucosal gel 1 application, 1 application  , Urethral, Daily PRN, Gladystheather Aguilar MD  Allergies   Allergen Reactions   • Tequin [Gatifloxacin] Rash   • Lipitor [Atorvastatin] Hives and Other (See Comments)     Rash and itching   • Ciprofloxacin Rash   • Iohexol Hives and Other (See Comments)     Contraindication with rosuvastatin  • Iv Contrast [Iodinated Contrast Media] Rash and Other (See Comments)     Unknown   • Levofloxacin Rash       Vitals: There were no vitals taken for this visit  There is no height or weight on file to calculate BMI  Physical Exam  Physical Exam  Vitals and nursing note reviewed  Constitutional:       Comments: Frail, thin    HENT:      Head: Normocephalic and atraumatic  Right Ear: External ear normal       Left Ear: External ear normal       Nose: Nose normal       Mouth/Throat:      Mouth: Mucous membranes are moist       Pharynx: Oropharynx is clear  Eyes:      Conjunctiva/sclera: Conjunctivae normal    Cardiovascular:      Rate and Rhythm: Normal rate and regular rhythm  Pulses: Normal pulses  Heart sounds: Normal heart sounds  Pulmonary:      Effort: Pulmonary effort is normal       Breath sounds: Normal breath sounds  Abdominal:      General: Abdomen is flat  Bowel sounds are normal       Palpations: Abdomen is soft  Musculoskeletal:         General: No swelling or tenderness  Cervical back: Neck supple  No muscular tenderness  Skin:     General: Skin is warm and dry  Capillary Refill: Capillary refill takes less than 2 seconds  Neurological:      Mental Status: He is alert and oriented to person, place, and time  Mental status is at baseline  Psychiatric:         Mood and Affect: Mood normal          Behavior: Behavior normal          Thought Content: Thought content normal          Judgment: Judgment normal          Labs: I have personally reviewed pertinent lab results    Lab Results   Component Value Date    WBC 6 56 03/01/2023    HGB 8 6 (L) 03/01/2023    HCT 27 4 (L) 03/01/2023    MCV 76 (L) 03/01/2023     (L) 03/01/2023     Lab Results   Component Value Date    GLUCOSE 179 (H) 08/05/2022    CALCIUM 9 0 03/01/2023     12/17/2015    K 4 4 03/01/2023    CO2 22 03/01/2023     03/01/2023    BUN 34 (H) 03/01/2023    CREATININE 2 31 (H) 03/01/2023     No results found for: IGE  Lab Results   Component Value Date    ALT 22 12/01/2022    AST 27 12/01/2022    GGT 33 02/10/2022    ALKPHOS 75 12/01/2022    BILITOT 0 51 12/15/2015         Imaging and other studies: I have personally reviewed pertinent films in PACS  Chest x-ray 3/1/2023: resolved pneumothorax  CXR 2/28/23: pigtail with resolved pneumothorax  CT chest 2/28/23: Spiculated right upper lobe mass, small pleural effusion on the right, right-sided pigtail  Pulmonary function testing: None available    EKG, Pathology, and Other Studies: I have personally reviewed pertinent reports        Sundeep Horta MD  Pulmonary/Critical Care Fellow PGY-IV  [facility]

## 2023-03-20 NOTE — TELEPHONE ENCOUNTER
Called patient to schedule 1 month follow up with Jose Patel since patient skipped check out  There was no answer so left message with reason for call and call back number for patient to call back  Needed to know if patient is willing to drive to [facility] to see Arcenio Mcclain due to her schedule at [facility] being full  Also called Rogers's daughter Nicole Benson  Phone went straight to voicemail  Unable to leave message due to voicemail not set up

## 2023-03-20 NOTE — PROGRESS NOTES
OSW met Hakan Torrez and Ryan Pizarro before his appointment with medical oncology today  Hakan Torrez stated in [place] he is feeling well and eating much better  His goal today is to talk to the doctor about any other options that may be available  He shared he would like to have good quality of life, but would also like to know how much time he has left  He expressed himself very appropriately and did not require translation  OSW asked if he has any emotional concerns, stressors or difficulties and he denied  Will continue ongoing support to [facility]  OSW s/w medical oncology provider after visit  He stated family will be discussing recommendations for hospice and will f/u with office when they are ready

## 2023-03-20 NOTE — PROGRESS NOTES
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, coronary artery disease, nephrolithiasis was found to have gross hematuria in May 2022, cystoscopies showed at least 5 cm mass involving the right lateral posterior wall, ureterovesical junction on the trigone, could not be stented, status post right nephrostomy tube     Biopsy 5/26/22 showed 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   CT scan did not show any local or distant metastasis       Because of liver transplant he is not a good candidate for immunotherapy  He was treated with concurrent radiation therapy with gemcitabine 27 mg per m2 twice weekly x 2 weeks 6/99 - 2/8/04 complicated with dysuria, fatigue  After long discussion the patient and his wife decided not to proceed with any additional gemcitabine        8/25/22 He completed radiation therapy with 36 fractions to bladder and pelvis      10/8/22 RUL solid pulmonary nodule new since 5/23/2021, suspicious for pulmonary metastases  Discussed possibility of biopsy for confirmation,  Evaluation with radiation oncology for possible SBRT  Patient elected to observe         CT scan on 1/2023 showed increasing in the right upper lobe lung nodule 1 3 cm, few bilateral pulmonary nodules consistent with metastatic disease       2/23 underwent pulmonary nodule biopsy, complicated by hydropneumothorax  Pathology showed metastatic disease, histologically similar to his bladder tumor           Had a rodolfo discussion with patient and his wife that this represents stage IV, metastatic bladder cancer    Patient was unable to tolerate low-dose Gemzar with radiation therapy in the past   He would likely tolerate chemotherapy poorly  He states he would like to try chemotherapy, however  His wife, Scooby Shafer, knowing how he tolerated treatment previously would rather he not pursue treatment  We reviewed the CAT scan, showed progression of disease,      Labs and imaging studies are reviewed by ordering provider once results are available  If there are findings that need immediate attention, you will be contacted when results available  Discussing results and the implication on your healthcare is best discussed in person at your follow-up visit  HPI:    64-year-old  male seen initially 6/14/22 regarding Muscle invasive bladder cancer      He has a complicated medical history including history of hepatitis-C, cirrhosis of the liver with subsequent liver transplant in 2010, diabetes mellitus type 2, diabetic neuropathy, benign paroxysmal positional vertigo, subdural hygroma, chronic kidney disease grade 3-4, history of previous alcoholism      Presented with gross hematuria in May 2022      CT scan of the abdomen and pelvis showed moderate right-sided hydroureteronephrosis with ureteral calculus seen, bladder mass measuring 7 4 x 6 6 cm was possible soft tissue extension into the right ureterovesical junction  Status post cystoscopy with 5 cm mass involving the right side as well as the trigone unable to stand the right-sided hydronephrosis status post nephrostomy tube      Biopsy showed 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, he was not candidate for surgical resection because of comorbidities, liver transplant as well as advanced age      Because of liver transplant he is not a good candidate for immunotherapy    The patient felt to be candidate for concurrent radiation with low-dose gemcitabine  He was treated with concurrent radiation therapy with gemcitabine 27 mg per m2 twice weekly x 2 weeks 3/84 - 6/7/36 complicated with dysuria, fatigue      After long discussion the patient and his wife decided not to proceed with any additional gemcitabine finished radiation therapy on 8/25/2022     CAT scan on 1/10/2022 showed 9 mm right upper lobe nodule status post right percutaneous nephroureteral stent        1/18/23 right trigone bladder lesion -   Right trigone bladder lesion:     - Partially denuded urothelium and underlying fibromuscular stroma with chronic lymphoplasmacytic inflammation and histologic changes compatible with prior biopsy      - No carcinoma identified      CT scan on January 23, 2023 showed increasing in the right upper lobe lung nodule to 1 3 cm, few new pulmonary nodules bilaterally measuring millimeters        2/20/23 RUL nodule biospy - Poorly differentiated carcinoma, the tumor is favored to represent a metastasis from the patient's known high-grade urothelial carcinoma, [id number], which appears histologically similar      2/24 - 3/1/23 admitted to the hospital with right-sided chest pain  Hydropneumothorax identified  He underwent chest tube placement    We had a CAT scan in February 2023 showed progression of disease    Long discussion with the patient and his family about 45 minutes and decided to talk about palliative/hospice care which I think is appropriate versus low-dose Taxotere every other week chemotherapy    They will get back to us            Test Results:    Imaging: XR chest portable    Result Date: 3/2/2023  Narrative: CHEST INDICATION:   chest tube, pain  COMPARISON:  CXR 2/27/2023 and chest CT 2/28/2023  EXAM PERFORMED/VIEWS:  XR CHEST PORTABLE FINDINGS:  Right port at cavoatrial junction  Cardiomediastinal silhouette appears unremarkable  Right pigtail catheter  Right upper lobe nodule better shown on CT  No effusion or pneumothorax  Sutures in left humeral head  Impression: Right pigtail with no pneumothorax  Right upper lobe nodule better shown on CT  Workstation performed: [id number]     XR chest portable    Result Date: 3/1/2023  Narrative: CHEST INDICATION:   Chest tube removal  Resolution of pneumothorax  COMPARISON:  2/20/2023 EXAM PERFORMED/VIEWS:  XR CHEST PORTABLE FINDINGS: Absent right chest tube  No pneumothorax  Right Mediport remains  Cardiomediastinal silhouette appears unremarkable  The lungs are clear  No pleural effusion  Left humeral head anchors again noted  Right rib old trauma  Impression: No pneumothorax Workstation performed: [id number]     XR chest portable    Result Date: 2/27/2023  Narrative: CHEST INDICATION:   Clamped chest tube  COMPARISON:  Chest radiograph 2/26/2023 EXAM PERFORMED/VIEWS:  XR CHEST PORTABLE FINDINGS:  Tip of right chest wall PowerPort projects over the superior cavoatrial junction  Cardiomediastinal silhouette appears unremarkable  Right pleural catheter  Unchanged trace right apical pneumothorax  No focal consolidation or pleural effusion  Left humeral head suture anchors  Impression: Unchanged trace right pneumothorax   Workstation performed: [id number]     XR chest portable    Result Date: 2/26/2023  Narrative: CHEST INDICATION:   eval pneumothorax  COMPARISON:  Chest radiograph February 25, 2023  EXAM PERFORMED/VIEWS:  XR CHEST PORTABLE FINDINGS:  Pigtail small caliber right chest tube overlying lateral mid thorax  Right IJ venous infusion device terminating in right atrium portable above the right main bronchus  Cardiomediastinal silhouette appears unremarkable  The lungs are clear with the exception of minimal linear atelectasis or scar in the left lung base  Small right apical pneumothorax significantly improved since the comparison study  14 mm between apical pleural line and thoracic apex  Orthopedic anchors left humeral head  Otherwise unremarkable for age  Impression: Right chest tube in place with markedly improved right pneumothorax   Findings marked for immediate notification in this inpatient  Workstation performed: [id number]     XR chest 1 view portable    Result Date: 2/24/2023  Narrative: CHEST INDICATION:   SOB, right sided chest pain  COMPARISON:  10/8/2022 EXAM PERFORMED/VIEWS:  XR CHEST PORTABLE FINDINGS:  Right-sided chest port noted  Cardiomediastinal silhouette appears unremarkable  Right pulmonary nodular density right apex corresponding to recent biopsy  Small right apex pneumothorax and right chest wall emphysema  Mild dependent changes noted right lung base  Tenting right lung base  Osseous structures appear within normal limits for patient age  Impression: 1  Tiny right apical pneumothorax after recent lung nodule biopsy  Mild soft tissue emphysema also noted right chest wall  2   Right apex nodule corresponding to recent biopsy target  The study was marked in [facility] for immediate notification  Workstation performed: [id number]     XR chest pa & lateral    Result Date: 2/28/2023  Narrative: CHEST INDICATION:  Follow-up pneumothorax  COMPARISON:  2/27/2023, CT chest, abdomen and pelvis 1/23/2023 EXAM PERFORMED/VIEWS:  XR CHEST PA & LATERAL FINDINGS: Cardiomediastinal silhouette appears unremarkable  Right chest wall Port-A-Cath  Pigtail right thoracostomy tube with no discernible pneumothorax  Subtle asymmetric right upper lobe density, similar  Linear subsegmental atelectasis left base  Suture anchors left humeral head  Right ureteral stent incidentally noted  Impression: No definitive pneumothorax  Subtle asymmetric right upper lobe density, similar  Workstation performed: [id number]     XR chest pa & lateral    Result Date: 2/25/2023  Narrative: CHEST INDICATION:   check pneumothorax  78-year-old male with right upper lobe pulmonary nodules, status post CT-guided biopsy on 2/20/2023  COMPARISON:  Portable chest from February 24, 2023  CT of the chest, abdomen and pelvis from January 23, 2023   EXAM PERFORMED/VIEWS:  XR CHEST PA & LATERAL  The frontal view was performed utilizing dual energy radiographic technique  FINDINGS:  Right-sided Port-A-Cath present with tip in right atrium  Cardiomediastinal silhouette appears unremarkable  Moderate-sized right hydropneumothorax, approximately 30-40%, not appreciably changed in size since yesterday's portable chest radiograph  Subsegmental atelectasis in the base of the right middle and lower lobes  1 2 cm right upper lobe nodule    Osseous structures appear within normal limits for patient age  Prior distal right clavicular resection  Small amount of soft tissue emphysema in the right lateral chest wall  Impression: Moderate-sized right hydropneumothorax, little changed since 2/24/2023  Workstation performed: [id number]     CT chest wo contrast    Result Date: 2/28/2023  Narrative: CT CHEST WITHOUT IV CONTRAST INDICATION:   Pneumothorax evaluation  Right upper lobe nodule biopsy  History of bladder cancer  Former smoker  COMPARISON:  CT scan from 1/23/2023  TECHNIQUE: CT examination of the chest was performed without intravenous contrast  Axial, sagittal, and coronal 2D reformatted images were created from the source data and submitted for interpretation  Radiation dose length product (DLP) for this visit:  166 55 mGy-cm   This examination, like all CT scans performed in the [facility], was performed utilizing techniques to minimize radiation dose exposure, including the use of iterative  reconstruction and automated exposure control  FINDINGS: LUNGS:  Enlarging spiculated right upper lobe nodule measuring 1 9 x 1 5 cm (previous 1 4 x 1 1 cm)  Additional pulmonary nodules noted in the right upper lobe including a 4 mm right upper lobe nodule image 3/53 (previous 3 mm)  Right upper lobe nodule  measuring 6 mm on image 3/63 (previous 4 mm)  Enlarging nodule in the left upper lobe measuring 4 mm on image 3/71 (previous 2 mm)  Minimal atelectasis at the right lung base    No endotracheal or endobronchial lesion identified  PLEURA:  Small right pleural effusion  Pigtail catheter in the anterior right pleural space without residual pneumothorax identified  HEART/GREAT VESSELS: Heart is unremarkable for patient's age  No thoracic aortic aneurysm  MEDIASTINUM AND JOHN:  No pathological lymphadenopathy  CHEST WALL AND LOWER NECK:  Right chest port unchanged  Mild gynecomastia bilaterally  Minimal air in the right chest wall related to pleural catheter  VISUALIZED STRUCTURES IN THE UPPER ABDOMEN:  Partially imaged right upper pole hydronephrosis  Liver transplant appears unremarkable  Gallbladder has been removed  Visualized portions of the upper abdomen appear otherwise unremarkable  OSSEOUS STRUCTURES:  No acute fracture or destructive osseous lesion  Multiple old right rib fractures  Impression: Enlarging spiculated right upper lobe nodule as well as multiple bilateral small pulmonary nodules consistent with metastases  Small right pleural effusion  Status post right pleural catheter placement without significant pneumothorax identified  Workstation performed: [id number]     IR biopsy lung    Result Date: 2/20/2023  Narrative: IMAGE-GUIDED BIOPSY Indication: Enlarging right upper lobe lung nodule  History of bladder cancer  Procedure: In the supine position, a suitable location for biopsy was identified with CT   The site and surrounding skin were prepped and draped in sterile fashion  1% lidocaine was used for local anesthetic and conscious sedation was administered  Using  CT guidance, a 17 gauge introducer needle was advanced into anterior right upper lobe lung nodule using a anterior approach   Core biopsy was performed with 2 passes made coaxially with a 18 gauge Biopince biopsy device  Specimens were submitted to on-site cytology  The introducer needle was removed   The puncture site was cleansed and a dressing was applied   This examination, like all CT scans performed in the [facility]  [address], was performed utilizing techniques to minimize radiation dose exposure, including the use of iterative reconstruction and automated exposure control  Sedation time: 30 minutes Comparison: CT January 23, 2023 Findings: Diagnostic material was identified preliminarily based on touch prep   2 solid core samples were obtained  There was moderate intraparenchymal hemorrhage surrounding the nodule after the 2nd pass  Patient was also coughing indicative of subclinical hemoptysis  Additional passes were therefore not made  Impression: Impression: Technically successful image-guided biopsy of enlarged right upper lobe lung nodule Workstation performed: [id number]     IR chest tube placement    Result Date: 2/25/2023  Narrative: IR CT guided chest tube placement Clinical History: Right-sided hydropneumothorax  Local anesthesia Technique: This examination, like all CT scans performed in the [facility], was performed utilizing techniques to minimize radiation dose exposure, including the use of iterative reconstruction and automated exposure control  The patient was brought to the interventional radiology area and placed supine on the CT scan table   Axial images through the region of interest were obtained  The skin was then marked, prepped, and draped in usual sterile fashion  Lidocaine was administered to the skin and a small skin incision was made  A 19 gauge needle was advanced into the right pleural space  A 035 wire was advanced through the needle, and the needle was removed  After tract dilatation, 8 Armenian all-purpose drainage catheter was advanced, and the loop formed in the pleural space  The catheter was connected to an Atrium  Follow-up imaging demonstrated appropriate positioning of the 8-Armenian pigtail right-sided chest tube  The patient tolerated the procedure well and suffered no complications  FINDINGS: CT chest demonstrated moderate sized right pneumothorax  Interval CT demonstrated wire within the right pneumothorax  Completion CT demonstrated newly placed 8-Estonian pigtail right chest tube in appropriate position  Impression: Impression: Placement of an 8-Estonian anterior right chest tube for pneumothorax  Workstation performed: [id number]       Labs:   Lab Results   Component Value Date    WBC 6 56 03/01/2023    HGB 8 6 (L) 03/01/2023    HCT 27 4 (L) 03/01/2023    MCV 76 (L) 03/01/2023     (L) 03/01/2023     Lab Results   Component Value Date     12/17/2015    K 4 4 03/01/2023     03/01/2023    CO2 22 03/01/2023    ANIONGAP 13 12/17/2015    BUN 34 (H) 03/01/2023    CREATININE 2 31 (H) 03/01/2023    GLUCOSE 179 (H) 08/05/2022    GLUF 54 (L) 02/25/2023    CALCIUM 9 0 03/01/2023    CORRECTEDCA 10 1 12/01/2022    AST 27 12/01/2022    ALT 22 12/01/2022    ALKPHOS 75 12/01/2022    PROT 7 7 12/15/2015    BILITOT 0 51 12/15/2015    EGFR 26 03/01/2023       Lab Results   Component Value Date    IRON 26 (L) 10/11/2022    TIBC 134 (L) 10/11/2022    FERRITIN 533 (H) 10/11/2022       Lab Results   Component Value Date    NWDLDTEU09 904 (H) 12/16/2014         ROS: Review of Systems   Constitutional: Negative  Negative for appetite change, chills, diaphoresis, fatigue, fever and unexpected weight change  HENT:   Negative for hearing loss, lump/mass, mouth sores, nosebleeds, sore throat, trouble swallowing and voice change  Eyes: Negative  Negative for eye problems and icterus  Respiratory: Negative  Negative for chest tightness, cough, hemoptysis and shortness of breath  Cardiovascular: Negative for chest pain and leg swelling  Gastrointestinal: Negative for abdominal distention, abdominal pain, blood in stool, constipation, diarrhea and nausea  Endocrine: Negative  Genitourinary: Negative for dysuria, frequency, hematuria and pelvic pain  Musculoskeletal: Negative    Negative for arthralgias, back pain, flank pain, gait problem, myalgias and neck stiffness  Skin: Negative for itching and rash  Neurological: Negative for dizziness, gait problem, headaches, light-headedness, numbness and speech difficulty  Hematological: Negative for adenopathy  Does not bruise/bleed easily  Psychiatric/Behavioral: Negative for confusion, decreased concentration, depression and sleep disturbance  The patient is not nervous/anxious  Current Medications: Reviewed  Allergies: Reviewed  PMH/FH/SH:  Reviewed      Physical Exam:    Body surface area is 1 58 meters squared  Wt Readings from Last 3 Encounters:   03/20/23 56 2 kg (124 lb)   03/09/23 55 3 kg (122 lb)   03/08/23 55 7 kg (122 lb 12 8 oz)        Temp Readings from Last 3 Encounters:   03/09/23 (!) 97 1 °F (36 2 °C)   03/08/23 98 5 °F (36 9 °C) (Tympanic)   03/01/23 98 °F (36 7 °C) (Oral)        BP Readings from Last 3 Encounters:   03/20/23 125/75   03/09/23 124/76   03/08/23 132/70         Pulse Readings from Last 3 Encounters:   03/20/23 75   03/09/23 76   03/08/23 80        Physical Exam  Vitals reviewed  Constitutional:       General: He is not in acute distress  Appearance: He is well-developed  He is not diaphoretic  HENT:      Head: Normocephalic and atraumatic  Eyes:      Conjunctiva/sclera: Conjunctivae normal    Neck:      Trachea: No tracheal deviation  Cardiovascular:      Rate and Rhythm: Normal rate and regular rhythm  Heart sounds: No murmur heard  No friction rub  No gallop  Pulmonary:      Effort: Pulmonary effort is normal  No respiratory distress  Breath sounds: Normal breath sounds  No wheezing or rales  Chest:      Chest wall: No tenderness  Abdominal:      General: There is no distension  Palpations: Abdomen is soft  Tenderness: There is no abdominal tenderness  Musculoskeletal:      Cervical back: Normal range of motion and neck supple  Lymphadenopathy:      Cervical: No cervical adenopathy     Skin:     General: Skin is warm and dry  Coloration: Skin is not pale  Findings: No erythema  Neurological:      Mental Status: He is alert and oriented to person, place, and time  Psychiatric:         Behavior: Behavior normal          Thought Content: Thought content normal          Judgment: Judgment normal          ECO    Goals and Barriers:  Current Goal: Minimize effects of disease  Barriers: None  Patient's Capacity to Self Care:  Patient is able to self care      Code Status: @[facility]@

## 2023-03-22 ENCOUNTER — OFFICE VISIT (OUTPATIENT)
Dept: PULMONOLOGY | Facility: CLINIC | Age: 73
End: 2023-03-22

## 2023-03-22 VITALS
HEIGHT: 63 IN | TEMPERATURE: 97.3 F | SYSTOLIC BLOOD PRESSURE: 118 MMHG | HEART RATE: 71 BPM | BODY MASS INDEX: 21.97 KG/M2 | OXYGEN SATURATION: 99 % | DIASTOLIC BLOOD PRESSURE: 60 MMHG | RESPIRATION RATE: 18 BRPM | WEIGHT: 124 LBS

## 2023-03-22 DIAGNOSIS — R91.1 PULMONARY NODULE: ICD-10-CM

## 2023-03-22 DIAGNOSIS — Z94.4 LIVER TRANSPLANT STATUS (HCC): Chronic | ICD-10-CM

## 2023-03-22 DIAGNOSIS — Z87.09 HISTORY OF PNEUMOTHORAX: ICD-10-CM

## 2023-03-22 DIAGNOSIS — Z09 HOSPITAL DISCHARGE FOLLOW-UP: Primary | ICD-10-CM

## 2023-03-22 RX ORDER — ALBUTEROL SULFATE 90 UG/1
2 AEROSOL, METERED RESPIRATORY (INHALATION) EVERY 6 HOURS PRN
Qty: 18 G | Refills: 5 | Status: SHIPPED | OUTPATIENT
Start: 2023-03-22

## 2023-03-28 ENCOUNTER — HOSPITAL ENCOUNTER (OUTPATIENT)
Dept: INFUSION CENTER | Facility: HOSPITAL | Age: 73
Discharge: HOME | End: 2023-03-28

## 2023-03-28 DIAGNOSIS — Z95.828 PORT-A-CATH IN PLACE: ICD-10-CM

## 2023-03-28 DIAGNOSIS — C67.0 MALIGNANT NEOPLASM OF TRIGONE OF URINARY BLADDER (HCC): Primary | ICD-10-CM

## 2023-03-28 DIAGNOSIS — C67.9 BLADDER CANCER METASTASIZED TO LUNG (HCC): ICD-10-CM

## 2023-03-28 DIAGNOSIS — C78.00 BLADDER CANCER METASTASIZED TO LUNG (HCC): ICD-10-CM

## 2023-03-28 LAB
ALBUMIN SERPL BCP-MCNC: 3.2 G/DL (ref 3.5–5)
ALP SERPL-CCNC: 67 U/L (ref 46–116)
ALT SERPL W P-5'-P-CCNC: 25 U/L (ref 12–78)
ANION GAP SERPL CALCULATED.3IONS-SCNC: 4 MMOL/L (ref 4–13)
AST SERPL W P-5'-P-CCNC: 32 U/L (ref 5–45)
BASOPHILS # BLD AUTO: 0.02 THOUSANDS/ÂΜL (ref 0–0.1)
BASOPHILS NFR BLD AUTO: 0 % (ref 0–1)
BILIRUB SERPL-MCNC: 0.17 MG/DL (ref 0.2–1)
BUN SERPL-MCNC: 47 MG/DL (ref 5–25)
CALCIUM ALBUM COR SERPL-MCNC: 9.1 MG/DL (ref 8.3–10.1)
CALCIUM SERPL-MCNC: 8.5 MG/DL (ref 8.3–10.1)
CHLORIDE SERPL-SCNC: 114 MMOL/L (ref 96–108)
CO2 SERPL-SCNC: 22 MMOL/L (ref 21–32)
CREAT SERPL-MCNC: 2.44 MG/DL (ref 0.6–1.3)
EOSINOPHIL # BLD AUTO: 0.6 THOUSAND/ÂΜL (ref 0–0.61)
EOSINOPHIL NFR BLD AUTO: 11 % (ref 0–6)
ERYTHROCYTE [DISTWIDTH] IN BLOOD BY AUTOMATED COUNT: 16.9 % (ref 11.6–15.1)
GFR SERPL CREATININE-BSD FRML MDRD: 25 ML/MIN/1.73SQ M
GLUCOSE SERPL-MCNC: 112 MG/DL (ref 65–140)
HCT VFR BLD AUTO: 27.3 % (ref 36.5–49.3)
HGB BLD-MCNC: 8.4 G/DL (ref 12–17)
IMM GRANULOCYTES # BLD AUTO: 0.01 THOUSAND/UL (ref 0–0.2)
IMM GRANULOCYTES NFR BLD AUTO: 0 % (ref 0–2)
LYMPHOCYTES # BLD AUTO: 1.35 THOUSANDS/ÂΜL (ref 0.6–4.47)
LYMPHOCYTES NFR BLD AUTO: 24 % (ref 14–44)
MCH RBC QN AUTO: 24 PG (ref 26.8–34.3)
MCHC RBC AUTO-ENTMCNC: 30.8 G/DL (ref 31.4–37.4)
MCV RBC AUTO: 78 FL (ref 82–98)
MONOCYTES # BLD AUTO: 0.45 THOUSAND/ÂΜL (ref 0.17–1.22)
MONOCYTES NFR BLD AUTO: 8 % (ref 4–12)
NEUTROPHILS # BLD AUTO: 3.15 THOUSANDS/ÂΜL (ref 1.85–7.62)
NEUTS SEG NFR BLD AUTO: 57 % (ref 43–75)
NRBC BLD AUTO-RTO: 0 /100 WBCS
PLATELET # BLD AUTO: 74 THOUSANDS/UL (ref 149–390)
POTASSIUM SERPL-SCNC: 4.3 MMOL/L (ref 3.5–5.3)
PROT SERPL-MCNC: 7.4 G/DL (ref 6.4–8.4)
RBC # BLD AUTO: 3.5 MILLION/UL (ref 3.88–5.62)
SODIUM SERPL-SCNC: 140 MMOL/L (ref 135–147)
WBC # BLD AUTO: 5.58 THOUSAND/UL (ref 4.31–10.16)

## 2023-03-30 ENCOUNTER — PATIENT OUTREACH (OUTPATIENT)
Dept: CASE MANAGEMENT | Facility: HOSPITAL | Age: 73
End: 2023-03-30

## 2023-03-30 NOTE — PROGRESS NOTES
Supportive outreach call placed to BAKARI IM GESÄUSE today  She stated Jacqui Puga is doing well since his last medical oncology visit and is at peace with what was discussed  He is not going to pursue any further cancer treatments, but is going to continue seeking care for other medical issues  BAKARI JOSEPH stated Jacqui Puga has a radiation oncology appt in April but wonders if it is still needed  Offered to reach out to radiation oncology RN and she is appreciative  OSW provided emotional support and offered continued outreach  BAKARI JOSEPH was very appreciative of ongoing support

## 2023-04-04 ENCOUNTER — TELEPHONE (OUTPATIENT)
Dept: PULMONOLOGY | Facility: CLINIC | Age: 73
End: 2023-04-04

## 2023-04-04 DIAGNOSIS — Z79.4 CONTROLLED TYPE 2 DIABETES MELLITUS WITH DIABETIC NEUROPATHY, WITH LONG-TERM CURRENT USE OF INSULIN (HCC): ICD-10-CM

## 2023-04-04 DIAGNOSIS — E11.40 CONTROLLED TYPE 2 DIABETES MELLITUS WITH DIABETIC NEUROPATHY, WITH LONG-TERM CURRENT USE OF INSULIN (HCC): ICD-10-CM

## 2023-04-04 DIAGNOSIS — R91.1 PULMONARY NODULE: ICD-10-CM

## 2023-04-04 RX ORDER — BLOOD-GLUCOSE CONTROL, NORMAL
EACH MISCELLANEOUS
Qty: 1 EACH | Refills: 0 | Status: SHIPPED | OUTPATIENT
Start: 2023-04-04

## 2023-04-04 RX ORDER — ALBUTEROL SULFATE 90 UG/1
2 AEROSOL, METERED RESPIRATORY (INHALATION) EVERY 6 HOURS PRN
Qty: 18 G | Refills: 5 | Status: SHIPPED | OUTPATIENT
Start: 2023-04-04

## 2023-04-04 NOTE — TELEPHONE ENCOUNTER
Patients wife calling saying that she went to the pharmacy and they told her that they don't have the prescription for Rogers's albuterol inhaler  She asked if the doctor can resend it to [facility]  Please advise

## 2023-04-06 ENCOUNTER — HOME CARE VISIT (OUTPATIENT)
Dept: HOME HEALTH SERVICES | Facility: HOME HEALTHCARE | Age: 73
End: 2023-04-06

## 2023-04-06 ENCOUNTER — TELEPHONE (OUTPATIENT)
Dept: HEMATOLOGY ONCOLOGY | Facility: CLINIC | Age: 73
End: 2023-04-06

## 2023-04-06 DIAGNOSIS — C67.9 MALIGNANT NEOPLASM OF URINARY BLADDER, UNSPECIFIED SITE (HCC): Primary | ICD-10-CM

## 2023-04-07 ENCOUNTER — APPOINTMENT (OUTPATIENT)
Dept: LAB | Facility: CLINIC | Age: 73
End: 2023-04-07

## 2023-04-07 DIAGNOSIS — D49.4 BLADDER TUMOR: ICD-10-CM

## 2023-04-07 DIAGNOSIS — R39.89 SUSPECTED UTI: ICD-10-CM

## 2023-04-07 DIAGNOSIS — N13.30 HYDRONEPHROSIS OF RIGHT KIDNEY: ICD-10-CM

## 2023-04-09 LAB — BACTERIA UR CULT: ABNORMAL

## 2023-04-21 ENCOUNTER — TELEPHONE (OUTPATIENT)
Dept: NEUROLOGY | Facility: CLINIC | Age: 73
End: 2023-04-21

## 2023-04-21 NOTE — TELEPHONE ENCOUNTER
"I have not seen this patient in over a year  On chart review, he has had extensive changes to his medical history since that time, including being diagnosed with invasive bladder cancer with mets after presenting with gross hematuria last year  We have not seen him since that time  Also appears he is on hospice? I see a note from 4/6/23 from home hospice saying \"Pt no longer wants any rx, wants comfort measures\"  Can we confirm this? He really needs an appt to discuss extensive medical issues that have occurred since we last saw him  He has history of stroke and should be on antiplatelet therapy, but really would need input from his hematologist regarding that  He sees Dr Tanvi Chau, last seen on 3/20  I would have them contact Dr Tanvi Chau and see if they feel he can safely remain on antiplatelet therapy with current platelet count    He needs an appt with us, can be seen in [place] with any stroke provider  "

## 2023-04-21 NOTE — TELEPHONE ENCOUNTER
Received VM transcription:    I have a question for Bhavya Denson  He's on Plavix medication and they told him to stop it because his platelet count was low  And they was supposed to replace it or something? I'm not sure  Can you please give me a call at [phone]  Thank you  I would like to talk to you about it and explain everything better  So please give me a call   --------------------------------------------------------    Chart reviewed  Please see communication thread on 4/12 encounter  Dr Errol Maki recommends holding Plavix postop due to low platelet count of 22,926 and typically holds for counts below 100K when dealing with surgical pt's  Pt's PCP Dr Emmer Meigs states neurology ordered it in 2021 and she has been filling all this time  She recommends pt reach out to neuro to see if neuro still wants him on it  Called pt's SO Rosibel  She informs of Plavix stopped  Explained how platelets help with blood clotting and with it being so low that pt has risk of bleeding and if pt is on Plavix, this could increase risk even more  She verbalizes understanding  Advised her that Summer Curry would be notified and with any recommendations, will reach out to her  Pt further reports that pt can be stubborn, that he doesn't like to go to the hospital  She explains that pt has difficulty understanding all of this  She does her best to help him understand  Says she took him to see GI doctor for routine evaluation due to hx of liver transplant (see scanned doc)  Pt was told he may need transfusion as his Hgb is low 7 6  Natalie Lam says pt to follow up with PCP  Pt feeling fatigue and looking pale  In regard to follow up appt with neurology, she says they are closer to [place] and would like to see a provider at the [address] if possible as [facility] office is too far for them  Summer Curry - Please advise on Plavix and which provider pt can see at [facility]      Best cb [phone], ok to leave detailed message

## 2023-04-24 ENCOUNTER — TELEPHONE (OUTPATIENT)
Dept: FAMILY MEDICINE CLINIC | Facility: CLINIC | Age: 73
End: 2023-04-24

## 2023-04-24 NOTE — TELEPHONE ENCOUNTER
Patients significant other called and stated that the patient has been fatigue and weakness for about 2 weeks  The patients significant other stated that his platelet counts have been down and she was told by GI and neruo to have the patient to follow up with PCP  They told her that the patient might need a blood transfusion  The patients significant other wants the patient to see only you no one else in the office  The patients significant other was asking for labs to be ordered to check his blood count  Is there anyway you could put in orders or see the patient? Please advise

## 2023-04-26 ENCOUNTER — TELEPHONE (OUTPATIENT)
Dept: HEMATOLOGY ONCOLOGY | Facility: CLINIC | Age: 73
End: 2023-04-26

## 2023-04-26 ENCOUNTER — HOSPITAL ENCOUNTER (OUTPATIENT)
Dept: INFUSION CENTER | Facility: HOSPITAL | Age: 73
Discharge: HOME | End: 2023-04-26

## 2023-04-26 ENCOUNTER — PATIENT OUTREACH (OUTPATIENT)
Dept: CASE MANAGEMENT | Facility: HOSPITAL | Age: 73
End: 2023-04-26

## 2023-04-26 DIAGNOSIS — D64.9 ANEMIA, UNSPECIFIED TYPE: Primary | ICD-10-CM

## 2023-04-26 DIAGNOSIS — N18.9 CHRONIC KIDNEY DISEASE, UNSPECIFIED CKD STAGE: ICD-10-CM

## 2023-04-26 DIAGNOSIS — D64.9 ANEMIA, UNSPECIFIED TYPE: ICD-10-CM

## 2023-04-26 DIAGNOSIS — C67.9 MALIGNANT NEOPLASM OF URINARY BLADDER, UNSPECIFIED SITE (HCC): Primary | ICD-10-CM

## 2023-04-26 DIAGNOSIS — Z95.828 PORT-A-CATH IN PLACE: ICD-10-CM

## 2023-04-26 DIAGNOSIS — C67.9 MALIGNANT NEOPLASM OF URINARY BLADDER, UNSPECIFIED SITE (HCC): ICD-10-CM

## 2023-04-26 LAB
ANION GAP SERPL CALCULATED.3IONS-SCNC: 3 MMOL/L (ref 4–13)
BASOPHILS # BLD AUTO: 0.03 THOUSANDS/ΜL (ref 0–0.1)
BASOPHILS NFR BLD AUTO: 1 % (ref 0–1)
BUN SERPL-MCNC: 64 MG/DL (ref 5–25)
CALCIUM SERPL-MCNC: 8.2 MG/DL (ref 8.3–10.1)
CHLORIDE SERPL-SCNC: 117 MMOL/L (ref 96–108)
CO2 SERPL-SCNC: 18 MMOL/L (ref 21–32)
CREAT SERPL-MCNC: 4.18 MG/DL (ref 0.6–1.3)
EOSINOPHIL # BLD AUTO: 0.34 THOUSAND/ΜL (ref 0–0.61)
EOSINOPHIL NFR BLD AUTO: 5 % (ref 0–6)
ERYTHROCYTE [DISTWIDTH] IN BLOOD BY AUTOMATED COUNT: 16.9 % (ref 11.6–15.1)
GFR SERPL CREATININE-BSD FRML MDRD: 13 ML/MIN/1.73SQ M
GLUCOSE SERPL-MCNC: 209 MG/DL (ref 65–140)
HCT VFR BLD AUTO: 26.2 % (ref 36.5–49.3)
HGB BLD-MCNC: 8.3 G/DL (ref 12–17)
IMM GRANULOCYTES # BLD AUTO: 0.02 THOUSAND/UL (ref 0–0.2)
IMM GRANULOCYTES NFR BLD AUTO: 0 % (ref 0–2)
LYMPHOCYTES # BLD AUTO: 1.09 THOUSANDS/ΜL (ref 0.6–4.47)
LYMPHOCYTES NFR BLD AUTO: 17 % (ref 14–44)
MCH RBC QN AUTO: 24.5 PG (ref 26.8–34.3)
MCHC RBC AUTO-ENTMCNC: 31.7 G/DL (ref 31.4–37.4)
MCV RBC AUTO: 77 FL (ref 82–98)
MONOCYTES # BLD AUTO: 0.45 THOUSAND/ΜL (ref 0.17–1.22)
MONOCYTES NFR BLD AUTO: 7 % (ref 4–12)
NEUTROPHILS # BLD AUTO: 4.32 THOUSANDS/ΜL (ref 1.85–7.62)
NEUTS SEG NFR BLD AUTO: 70 % (ref 43–75)
NRBC BLD AUTO-RTO: 0 /100 WBCS
PLATELET # BLD AUTO: 76 THOUSANDS/UL (ref 149–390)
POTASSIUM SERPL-SCNC: 4.5 MMOL/L (ref 3.5–5.3)
RBC # BLD AUTO: 3.39 MILLION/UL (ref 3.88–5.62)
SODIUM SERPL-SCNC: 138 MMOL/L (ref 135–147)
WBC # BLD AUTO: 6.25 THOUSAND/UL (ref 4.31–10.16)

## 2023-04-26 NOTE — PROGRESS NOTES
Patient's port accessed, blood return noted and central labs drawn  Port flushed and locked with NSS and de-accessed  AVS given to patient

## 2023-04-26 NOTE — TELEPHONE ENCOUNTER
Spoke with pt's SO Rosibel  Advised her of Judy's response and recommendations  She verbalizes understanding  She says pt is not on hospice, not yet  Says her daughter reached out to hematologist Dr Jed Potts and awaiting his response to see what they are going to do about Hgb being low  Per chart review:  PCP advised pt to go to ED due to low Hgb and being symptomatic  Scheduling - Please contact pt and assist with scheduling pt with stroke provider as recommended by Nikhil Cevallos

## 2023-04-26 NOTE — TELEPHONE ENCOUNTER
Rosa, patient does not want treatment directed care but is not ready for hospice  Spouse became tearful during our conversation wanted to best quality of life for patient  Are you able to call and offer services/support and discuss possibility of palliative care  Thanks!

## 2023-04-26 NOTE — TELEPHONE ENCOUNTER
Patient Call    Who are you speaking with? Spouse    If it is not the patient, are they listed on an active communication consent form? Yes   What is the reason for this call? Patient's spouse, Dara Rendon, is calling in requesting a call back regarding Rogers's blood transfusions    Does this require a call back? Yes   If a call back is required, please list best call back number [phone]   If a call back is required, advise that a message will be forwarded to their care team and someone will return their call as soon as possible  Did you relay this information to the patient?  Yes

## 2023-04-26 NOTE — PROGRESS NOTES
"OSW received message from BAKARI IM GESÄUSE today  Called her back and offered support  She stated Rogers's hemoglobin is low and was recommended to have a blood transfusion  His GI specialist (Dr Janeth Schulz) suggested speaking with PCP  She called his PCP who directed them to the ED for the transfusion  Minh Gabriel stated he did not want to go because he will be uncomfortable and does not want to be in the ED environment  She called the [facility] to inquire if he can have transfusion in the infusion center but did not hear back  Offered to send in-basket to medical oncology RN team and she is appreciative  She stated she is trying to advocate for him and feels like she is hitting barriers  OSW explained this writer will try to help  OSW suggested using [facility] mobile lab for any future lab draws to avoid having to take him to the outpatient lab setting and she is interested  Discussed Rogers's current wishes and explained if he does not want to pursue further treatment he may be hospice eligible  She stated he has expressed he wants to see his doctors and go to the hospital if needed, but would like to set some limits if able  He is not ready for hospice cares at this point  BAKARI ESTEFANI JOSEPH stated his older sons want to take him to [facility] to visit family and spend time together  She wants to make sure he is in the best shape he can be because she \"does not want to spend time in a hospital in [facility] \" OSW understood her concerns  BAKARI IM GESÄUSE shared they have a fuel oil balance of $2000 00  The oil company wants her to pay over $400 towards the balance  She stated she cannot pay that much  They did receive 2 LIHE[employer] payments of $300 and $150  She is going to contact the [facility] about any other helps  OSW will search any additional programs that may be available through [facility]    In-basket sent to med-onc team        "

## 2023-04-26 NOTE — TELEPHONE ENCOUNTER
I called the patient and spoke with his wife and and she asked for an afternoon appointment and I offered her 5-2-23 with Shabnam Villalobos in [place] and she accepted

## 2023-04-26 NOTE — TELEPHONE ENCOUNTER
Hemoglobin 8 3, platelets 48,751 from today  Spouse notified no blood transfusion warranted at this time  She verified, pt does not want treatment for cancer  Just best quality of life for remainder  Advised spouse to call with any further questions or concerns  She was agreeable to this

## 2023-04-26 NOTE — TELEPHONE ENCOUNTER
Spoke with Inna Bryant, she verified his most recent labs are from 4/12  Discussed with Dr Henao Late updated labs required  Spouse does not want mobile labs, due to wanting port accessed  Only symptoms are increased fatigue  Spouse aware [facility] infusion today 1:00 for updated labs  Will transfuse based off these results  She was appreciative of my call

## 2023-04-27 ENCOUNTER — APPOINTMENT (EMERGENCY)
Dept: RADIOLOGY | Facility: HOSPITAL | Age: 73
End: 2023-04-27

## 2023-04-27 ENCOUNTER — HOSPITAL ENCOUNTER (INPATIENT)
Facility: HOSPITAL | Age: 73
LOS: 3 days | Discharge: HOME | End: 2023-04-30
Attending: EMERGENCY MEDICINE | Admitting: INTERNAL MEDICINE

## 2023-04-27 ENCOUNTER — TELEPHONE (OUTPATIENT)
Dept: FAMILY MEDICINE CLINIC | Facility: CLINIC | Age: 73
End: 2023-04-27

## 2023-04-27 DIAGNOSIS — R31.21 ASYMPTOMATIC MICROSCOPIC HEMATURIA: ICD-10-CM

## 2023-04-27 DIAGNOSIS — K40.90 INGUINAL HERNIA: ICD-10-CM

## 2023-04-27 DIAGNOSIS — Z79.4 CONTROLLED TYPE 2 DIABETES MELLITUS WITH DIABETIC NEUROPATHY, WITH LONG-TERM CURRENT USE OF INSULIN (HCC): ICD-10-CM

## 2023-04-27 DIAGNOSIS — N13.39 OTHER HYDRONEPHROSIS: ICD-10-CM

## 2023-04-27 DIAGNOSIS — R91.1 PULMONARY NODULE: ICD-10-CM

## 2023-04-27 DIAGNOSIS — N17.9 ACUTE KIDNEY INJURY (HCC): Primary | ICD-10-CM

## 2023-04-27 DIAGNOSIS — Z96.0 URETERAL STENT PRESENT: ICD-10-CM

## 2023-04-27 DIAGNOSIS — E11.40 CONTROLLED TYPE 2 DIABETES MELLITUS WITH DIABETIC NEUROPATHY, WITH LONG-TERM CURRENT USE OF INSULIN (HCC): ICD-10-CM

## 2023-04-27 DIAGNOSIS — B37.9: ICD-10-CM

## 2023-04-27 DIAGNOSIS — D64.9 ANEMIA: ICD-10-CM

## 2023-04-27 DIAGNOSIS — N13.30 HYDROURETERONEPHROSIS: ICD-10-CM

## 2023-04-27 PROBLEM — D75.89 BICYTOPENIA: Status: ACTIVE | Noted: 2023-04-27

## 2023-04-27 PROBLEM — D75.89 BICYTOPENIA: Status: ACTIVE | Noted: 2023-01-01

## 2023-04-27 LAB
ALBUMIN SERPL BCP-MCNC: 2.9 G/DL (ref 3.5–5)
ALP SERPL-CCNC: 76 U/L (ref 46–116)
ALT SERPL W P-5'-P-CCNC: 52 U/L (ref 12–78)
ANION GAP SERPL CALCULATED.3IONS-SCNC: 5 MMOL/L (ref 4–13)
AST SERPL W P-5'-P-CCNC: 57 U/L (ref 5–45)
BACTERIA UR QL AUTO: ABNORMAL /HPF
BASOPHILS # BLD AUTO: 0.03 THOUSANDS/ΜL (ref 0–0.1)
BASOPHILS NFR BLD AUTO: 1 % (ref 0–1)
BILIRUB SERPL-MCNC: 0.17 MG/DL (ref 0.2–1)
BILIRUB UR QL STRIP: NEGATIVE
BUDDING YEAST: PRESENT
BUN SERPL-MCNC: 64 MG/DL (ref 5–25)
CALCIUM ALBUM COR SERPL-MCNC: 9 MG/DL (ref 8.3–10.1)
CALCIUM SERPL-MCNC: 8.1 MG/DL (ref 8.3–10.1)
CHLORIDE SERPL-SCNC: 115 MMOL/L (ref 96–108)
CLARITY UR: ABNORMAL
CO2 SERPL-SCNC: 18 MMOL/L (ref 21–32)
COLOR UR: ABNORMAL
CREAT SERPL-MCNC: 4.06 MG/DL (ref 0.6–1.3)
CREAT UR-MCNC: 83.9 MG/DL
EOSINOPHIL # BLD AUTO: 0.33 THOUSAND/ΜL (ref 0–0.61)
EOSINOPHIL NFR BLD AUTO: 5 % (ref 0–6)
ERYTHROCYTE [DISTWIDTH] IN BLOOD BY AUTOMATED COUNT: 16.9 % (ref 11.6–15.1)
GFR SERPL CREATININE-BSD FRML MDRD: 13 ML/MIN/1.73SQ M
GLUCOSE SERPL-MCNC: 144 MG/DL (ref 65–140)
GLUCOSE SERPL-MCNC: 146 MG/DL (ref 65–140)
GLUCOSE SERPL-MCNC: 204 MG/DL (ref 65–140)
GLUCOSE UR STRIP-MCNC: ABNORMAL MG/DL
HCT VFR BLD AUTO: 24.7 % (ref 36.5–49.3)
HGB BLD-MCNC: 7.9 G/DL (ref 12–17)
HGB UR QL STRIP.AUTO: ABNORMAL
IMM GRANULOCYTES # BLD AUTO: 0.03 THOUSAND/UL (ref 0–0.2)
IMM GRANULOCYTES NFR BLD AUTO: 1 % (ref 0–2)
KETONES UR STRIP-MCNC: NEGATIVE MG/DL
LEUKOCYTE ESTERASE UR QL STRIP: ABNORMAL
LYMPHOCYTES # BLD AUTO: 1.03 THOUSANDS/ΜL (ref 0.6–4.47)
LYMPHOCYTES NFR BLD AUTO: 17 % (ref 14–44)
MCH RBC QN AUTO: 24.5 PG (ref 26.8–34.3)
MCHC RBC AUTO-ENTMCNC: 32 G/DL (ref 31.4–37.4)
MCV RBC AUTO: 77 FL (ref 82–98)
MONOCYTES # BLD AUTO: 0.45 THOUSAND/ΜL (ref 0.17–1.22)
MONOCYTES NFR BLD AUTO: 7 % (ref 4–12)
MUCOUS THREADS UR QL AUTO: ABNORMAL
NEUTROPHILS # BLD AUTO: 4.25 THOUSANDS/ΜL (ref 1.85–7.62)
NEUTS SEG NFR BLD AUTO: 69 % (ref 43–75)
NITRITE UR QL STRIP: NEGATIVE
NON-SQ EPI CELLS URNS QL MICRO: ABNORMAL /HPF
NRBC BLD AUTO-RTO: 0 /100 WBCS
PH UR STRIP.AUTO: 5.5 [PH]
PLATELET # BLD AUTO: 65 THOUSANDS/UL (ref 149–390)
PMV BLD AUTO: 12.5 FL (ref 8.9–12.7)
POTASSIUM SERPL-SCNC: 4.4 MMOL/L (ref 3.5–5.3)
PROT SERPL-MCNC: 7.1 G/DL (ref 6.4–8.4)
PROT UR STRIP-MCNC: ABNORMAL MG/DL
RBC # BLD AUTO: 3.23 MILLION/UL (ref 3.88–5.62)
RBC #/AREA URNS AUTO: ABNORMAL /HPF
SODIUM 24H UR-SCNC: 81 MOL/L
SODIUM SERPL-SCNC: 138 MMOL/L (ref 135–147)
SP GR UR STRIP.AUTO: 1.01 (ref 1–1.03)
UROBILINOGEN UR STRIP-ACNC: <2 MG/DL
WBC # BLD AUTO: 6.12 THOUSAND/UL (ref 4.31–10.16)
WBC #/AREA URNS AUTO: ABNORMAL /HPF

## 2023-04-27 RX ORDER — HEPARIN SODIUM 5000 [USP'U]/ML
5000 INJECTION, SOLUTION INTRAVENOUS; SUBCUTANEOUS EVERY 8 HOURS SCHEDULED
Status: DISCONTINUED | OUTPATIENT
Start: 2023-04-27 | End: 2023-04-27

## 2023-04-27 RX ORDER — INSULIN LISPRO 100 [IU]/ML
1-5 INJECTION, SOLUTION INTRAVENOUS; SUBCUTANEOUS
Status: DISCONTINUED | OUTPATIENT
Start: 2023-04-27 | End: 2023-04-30 | Stop reason: HOSPADM

## 2023-04-27 RX ORDER — ACETAMINOPHEN 325 MG/1
650 TABLET ORAL EVERY 6 HOURS PRN
Status: DISCONTINUED | OUTPATIENT
Start: 2023-04-27 | End: 2023-04-30 | Stop reason: HOSPADM

## 2023-04-27 RX ORDER — TACROLIMUS 1 MG/1
1 CAPSULE ORAL EVERY 12 HOURS
Status: DISCONTINUED | OUTPATIENT
Start: 2023-04-27 | End: 2023-04-30 | Stop reason: HOSPADM

## 2023-04-27 RX ORDER — ALBUTEROL SULFATE 90 UG/1
2 AEROSOL, METERED RESPIRATORY (INHALATION) EVERY 6 HOURS PRN
Status: DISCONTINUED | OUTPATIENT
Start: 2023-04-27 | End: 2023-04-30 | Stop reason: HOSPADM

## 2023-04-27 RX ORDER — ZOLPIDEM TARTRATE 5 MG/1
5 TABLET ORAL
Status: DISCONTINUED | OUTPATIENT
Start: 2023-04-27 | End: 2023-04-30 | Stop reason: HOSPADM

## 2023-04-27 RX ORDER — TEMAZEPAM 15 MG/1
15 CAPSULE ORAL
Status: DISCONTINUED | OUTPATIENT
Start: 2023-04-27 | End: 2023-04-30 | Stop reason: HOSPADM

## 2023-04-27 RX ORDER — PREGABALIN 75 MG/1
75 CAPSULE ORAL 2 TIMES DAILY
Status: DISCONTINUED | OUTPATIENT
Start: 2023-04-27 | End: 2023-04-30 | Stop reason: HOSPADM

## 2023-04-27 RX ORDER — BLOOD-GLUCOSE CONTROL, NORMAL
EACH MISCELLANEOUS
Qty: 1 EACH | Refills: 0 | Status: SHIPPED | OUTPATIENT
Start: 2023-04-27

## 2023-04-27 RX ORDER — OXYBUTYNIN CHLORIDE 5 MG/1
5 TABLET ORAL 2 TIMES DAILY PRN
Status: DISCONTINUED | OUTPATIENT
Start: 2023-04-27 | End: 2023-04-30 | Stop reason: HOSPADM

## 2023-04-27 RX ORDER — TAMSULOSIN HYDROCHLORIDE 0.4 MG/1
0.4 CAPSULE ORAL
Status: DISCONTINUED | OUTPATIENT
Start: 2023-04-27 | End: 2023-04-30 | Stop reason: HOSPADM

## 2023-04-27 RX ORDER — ACETAMINOPHEN 325 MG/1
975 TABLET ORAL EVERY 8 HOURS SCHEDULED
Status: CANCELLED | OUTPATIENT
Start: 2023-04-27

## 2023-04-27 RX ADMIN — TACROLIMUS 1 MG: 1 CAPSULE ORAL at 22:35

## 2023-04-27 RX ADMIN — TEMAZEPAM 15 MG: 15 CAPSULE ORAL at 22:35

## 2023-04-27 RX ADMIN — ZOLPIDEM TARTRATE 5 MG: 5 TABLET ORAL at 22:35

## 2023-04-27 RX ADMIN — PREGABALIN 75 MG: 75 CAPSULE ORAL at 18:07

## 2023-04-27 RX ADMIN — INSULIN LISPRO 1 UNITS: 100 INJECTION, SOLUTION INTRAVENOUS; SUBCUTANEOUS at 22:36

## 2023-04-27 RX ADMIN — INSULIN DETEMIR 15 UNITS: 100 INJECTION, SOLUTION SUBCUTANEOUS at 22:36

## 2023-04-27 RX ADMIN — TAMSULOSIN HYDROCHLORIDE 0.4 MG: 0.4 CAPSULE ORAL at 18:07

## 2023-04-27 RX ADMIN — SODIUM BICARBONATE 75 ML/HR: 84 INJECTION, SOLUTION INTRAVENOUS at 18:08

## 2023-04-27 NOTE — ASSESSMENT & PLAN NOTE
Lab Results   Component Value Date    HGBA1C 7 4 (A) 12/06/2022       No results for input(s): POCGLU in the last 72 hours      Blood Sugar Average: Last 72 hrs:  · renal diet for now  · Continue home dose of basal insulin  · fingersticks QID with sliding scale coverage  · Add prandial insulin if indicated

## 2023-04-27 NOTE — H&P
[facility] [facility]  H&P  Name: Chapis Palomo 74 y o  male I MRN: [id number]  Unit/Bed#: ED 01 I Date of Admission: 4/27/2023   Date of Service: 4/27/2023 I Hospital Day: 0      Assessment/Plan   * Acute renal failure superimposed on stage 3 chronic kidney disease [facility])  Assessment & Plan  Lab Results   Component Value Date    EGFR 13 04/27/2023    EGFR 13 04/26/2023    EGFR 25 03/28/2023    CREATININE 4 06 (H) 04/27/2023    CREATININE 4 18 (H) 04/26/2023    CREATININE 2 44 (H) 03/28/2023   · routine outpatient labs remarkable for CARLOTTA on CKD3  · Baseline creatinine is about 2 4  · Denies any complaints including diarrhea or vomiting or flank pain  · CT abdomen done in the ED is positive for persistent hydronephrosis despite stent placement on the right    · Will check bladder scan and utilize retention protocol if needed  · Check feNa and urine eosinophils  · Check tacrolimus level  · IV hydration with a bicarb infusion given low serum bicarbonate  · Consult with urology regarding the persistent hydronephrosis  · Consult with his nephrologist for assistance with management of CARLOTTA  · Avoid NSAIDS, nephrotoxins and avoid hypotension  ·     History of hydronephrosis  Assessment & Plan  · Recent cystoscopy and stent with urethral dilation  · Plan as above    Liver transplant status ([facility] [address] )  Assessment & Plan  · LFTs appear stable  · Continue tacrolimus at 1mg BID  · Check tacrolimus level as mentioned above    Bicytopenia  Assessment & Plan  · Anemia and thrombocytopenia  · Anemia is likely multifactorial  · Multiple blood draws  · Chronic illness/cancer  · Chronic kidney disease  · Iron deficiency  · Thrombocytopenia is also chronic  · Monitor CBC closely while on heparin  · Check iron studies  · Transfuse PRN  · May be a candidate for EPO, will defer to nephrology    Type 2 diabetes mellitus with diabetic peripheral angiopathy without gangrene, with long-term current use of insulin "([facility])  Assessment & Plan  Lab Results   Component Value Date    HGBA1C 7 4 (A) 12/06/2022       No results for input(s): POCGLU in the last 72 hours  Blood Sugar Average: Last 72 hrs:  · renal diet for now  · Continue home dose of basal insulin  · fingersticks QID with sliding scale coverage  · Add prandial insulin if indicated    Peripheral neuropathy  Assessment & Plan  · Continue lyrica       VTE Pharmacologic Prophylaxis:   Moderate Risk (Score 3-4) - Pharmacological DVT Prophylaxis Ordered: heparin  Code Status: Level 1 - Full Code   Discussion with family: Updated  (wife) at bedside  Anticipated Length of Stay: Patient will be admitted on an inpatient basis with an anticipated length of stay of greater than 2 midnights secondary to ARF on CKD  Total Time Spent on Date of Encounter in care of patient: 40 minutes This time was spent on one or more of the following: performing physical exam; counseling and coordination of care; obtaining or reviewing history; documenting in the medical record; reviewing/ordering tests, medications or procedures; communicating with other healthcare professionals and discussing with patient's family/caregivers  Chief Complaint: abnormal labs    History of Present Illness:  Debbie Gomez is a 74 y o  male with a PMH of CKD who presents with abnormal lab results  He had routine outpatient labs done by his nephrologist which demonstrated an increase in creatinine above his baseline  He denies any symptoms including flank pain, abdominal pain, vomiting or diarrhea  No changes in medications recently  He did have a cystoscopy, stent placement and urethral dilation on 4/12  He reports his hematuria has resolved since then  Review of Systems:  Review of Systems   All other systems reviewed and are negative        Past Medical and Surgical History:   Past Medical History:   Diagnosis Date    Acute urinary retention 09/12/2022    Alcoholism ([facility] [address] )     \"Sober over " "21 years\"    Anemia     Arthritis     Back pain     Bladder cancer ([facility] [address] )     Bruises easily     Cerebral artery aneurysm     Change in mental state     last assessed 5/18/15; resolved 10/27/15    Chronic kidney disease     COVID-19 2022 not hospitalized fully recovered    Diabetes mellitus ([facility] [address] )     Difficult intravenous access     Drug dependence ([facility] [address] )     \"in the past--40yrs ago per sig other\"    Fatigue     last assessed 1/26/15; resolved 5/24/16    Full dentures     Hepatitis C     \"had treatment\"    History of cancer chemotherapy     History of radiation therapy     History of transfusion     per sig other \"had blood transfusion when anemic from urinary bleeding\"   [facility] discharge follow-up 12/21/2018    Hx of liver transplant ([facility] [address] )     Hydronephrosis of right kidney     Insomnia     \"after liver transplant\"    Kidney disease     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[facility] [address] )     Liver cirrhosis ([facility] [address] )     Liver transplant recipient ([facility] [address] )     [facility]\"    Lung cancer [facility])      Hematology/oncology Dr Espinosa Howe in place     Renal disorder     Shortness of breath     \"when moving around-started on inhalers\"    Stroke (cerebrum) ([facility] [address] )     Stroke ([facility] [address] )     diff short term memory    Subdural hygroma     2/27/14; resolved 7/28/15    Thrombocytopenia ([facility] [address] ) 09/20/2017    Uses Mauritanian as primary spoken language        Past Surgical History:   Procedure Laterality Date    BRAIN SURGERY  02/12/2014    left frontotemporal 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    CATARACT EXTRACTION      CHOLECYSTECTOMY      COLONOSCOPY      [place]    [place]    FL LUMBAR PUNCTURE DIAGNOSTIC  12/14/2018    FL RETROGRADE PYELOGRAM  01/18/2023    FL RETROGRADE PYELOGRAM  4/12/2023    HEPATITIS B SURFACE AB QN,LIVER TRANSPLANT (HISTORICAL)      IR BIOPSY LUNG  02/20/2023    IR CHEST TUBE PLACEMENT  02/25/2023    IR " NEPHROSTOMY TUBE CHECK/CHANGE/REPOSITION/REINSERTION/UPSIZE  07/29/2022    IR NEPHROSTOMY TUBE CHECK/CHANGE/REPOSITION/REINSERTION/UPSIZE  08/31/2022    IR NEPHROSTOMY TUBE CHECK/CHANGE/REPOSITION/REINSERTION/UPSIZE  10/07/2022    IR NEPHROSTOMY TUBE CHECK/CHANGE/REPOSITION/REINSERTION/UPSIZE  12/03/2022    IR NEPHROSTOMY TUBE PLACEMENT  05/28/2022    IR PICC LINE  12/14/2018    IR PORT PLACEMENT  06/23/2022    LIVER TRANSPLANTATION      LUNG BIOPSY      NEPHROSTOMY TUBE CHANGE N/A 01/18/2023    Procedure: Removal of  percutaneous nephroureteral catheter;  Surgeon: Irish Coburn MD;  [facility];  Service: Urology    FL CYSTO BLADDER W/URETERAL CATHETERIZATION Right 01/18/2023    Procedure: URETHRAL DILATION, CYSTOGRAM, CYSTOSCOPY, RIGHT RETEROGRADE PYELOGRAM, [phone], COMPLEX CHICAS PLACEMENT;  Surgeon: Irish Coburn MD;  [facility];  Service: Urology    FL CYSTO BLADDER W/URETERAL CATHETERIZATION Right 4/12/2023    Procedure: CYSTO  W/ STENT, urethral dilation with scope;  Surgeon: Irish Coburn MD;  [facility];  Service: Urology    FL CYSTO CALIBRATION DILAT URTL STRIX/STENOSIS N/A 4/12/2023    Procedure: DILATATION URETHRAL;  Surgeon: Irish Coburn MD;  [facility];  Service: Urology    ROTATOR CUFF REPAIR      SHOULDER SURGERY      TRANSURETHRAL RESECTION OF BLADDER TUMOR N/A 05/26/2022    Procedure: TRANSURETHRAL RESECTION OF BLADDER TUMOR (TURBT); Surgeon: Irish Coburn MD;  [facility];  Service: Urology       Meds/Allergies:  Prior to Admission medications    Medication Sig Start Date End Date Taking?  Authorizing Provider   acetaminophen (TYLENOL) 325 mg tablet Take 3 tablets (975 mg total) by mouth every 8 (eight) hours 9/22/22   Marsha Vale PA-C   albuterol (Ventolin HFA) 90 mcg/act inhaler Inhale 2 puffs every 6 (six) hours as needed for wheezing or shortness of breath 4/4/23   Radha Swanson MD   Alcohol Swabs (B-D SINGLE USE SWABS REGULAR) PADS USE 2-3 TIMES DAILY AS NEEDED 11/14/22   Historical Provider, MD   Blood Glucose Calibration (OT ULTRA/FASTTK CNTRL SOLN) SOLN USE AS DIRECTED 4/27/23   Laura Serene, DO   Comfort EZ Pen Needles 32G X 4 MM MISC USE 3-4 AS DIRECTED 5/17/22   Thora Serene, DO   Continuous Blood Gluc  (FreeStyle Frank 14 Day [place]) NATALIA Use 1 Device continuous 6/3/22   Laura Serene, DO   Incontinence Supply Disposable (Incontinence Brief Medium) MISC Use 4 (four) times a day 8/19/22 3/22/23  Vivian Wray MD   insulin detemir (Levemir FlexTouch) 100 Units/mL injection pen Inject 15 Units under the skin daily at bedtime 9/26/22   Laura Serene, DO   Lancet Devices (Lancing Device) MISC USE AS DIRECTED 4/11/23   Laura Serdelroy, DO   loperamide (IMODIUM) 2 mg capsule Take 1 capsule (2 mg total) by mouth 2 (two) times a day as needed for diarrhea [facility] por cynthia si tiene diarrea 8/26/22   Billy Gomez, DO   Multiple Vitamin (multivitamin) tablet Take 1 tablet by mouth daily    Historical Provider, MD   Nutritional Supplements (Glucerna Shake) LIQD Take 1 Bottle by mouth 3 (three) times a day 8/19/22   Vivian rWay MD   OneTouch Ultra test strip TEST 3-4 TIMES A DAY 4/11/23   Laura Serene, DO   oxybutynin (DITROPAN) 5 mg tablet Take 1 tablet (5 mg total) by mouth 2 (two) times a day as needed (bladder spasms) 12/4/22   Hetul Fregoso, DO   pregabalin (LYRICA) 75 mg capsule Take 1 capsule (75 mg total) by mouth 2 (two) times a day 3/28/23   Thora Serene, DO   rosuvastatin (CRESTOR) 40 MG tablet TAKE ONE (1) TABLET BY MOUTH DAILY 3/14/23   Thora Serene, DO   tacrolimus (PROGRAF) 1 mg capsule TAKE 1 CAPSULE (1 MG TOTAL) BY MOUTH EVERY 12 (TWELVE) HOURS 9/27/22   Riesa Serene, DO   tamsulosin (FLOMAX) 0 4 mg Take 1 capsule (0 4 mg total) by mouth daily with dinner 10/24/22 4/12/23  Felicitas Morin PA-C   temazepam (RESTORIL) 7 5 mg capsule TAKE 2 CAPSULES (15 MG TOTAL) BY MOUTH DAILY AT BEDTIME AS NEEDED FOR SLEEP  Patient taking differently: Take 15 mg by mouth daily at bedtime 22   Vonda Power, DO   zolpidem (AMBIEN) 5 mg tablet Take 1 tablet (5 mg total) by mouth daily at bedtime as needed for sleep  Patient taking differently: Take 5 mg by mouth daily at bedtime 3/8/23   Vonda Zuñigay, DO   Blood Glucose Calibration (OT ULTRA/FASTTK CNTRL SOLN) SOLN USE AS DIRECTED 23  Vonda Power, DO     I have reviewed home medications with patient family member  Allergies: Allergies   Allergen Reactions    Tequin [Gatifloxacin] Rash    Lipitor [Atorvastatin] Hives and Other (See Comments)     Rash and itching    Ciprofloxacin Rash    Iohexol Hives and Other (See Comments)     Contraindication with rosuvastatin       Iv Contrast [Iodinated Contrast Media] Rash and Other (See Comments)     Unknown    Levofloxacin Rash       Social History:  Marital Status: Single   Occupation: None  Patient Pre-hospital Living Situation: Home  Patient Pre-hospital Level of Mobility: walks  Patient Pre-hospital Diet Restrictions: None  Substance Use History:   Social History     Substance and Sexual Activity   Alcohol Use Not Currently     Social History     Tobacco Use   Smoking Status Former    Packs/day: 1 00    Years: 35 00    Pack years: 35 00    Types: Cigarettes    Start date:     Quit date:     Years since quittin 3    Passive exposure: Past   Smokeless Tobacco Never   Tobacco Comments    former smoker per allscripts      Social History     Substance and Sexual Activity   Drug Use No    Comment: remotely quit drug use per allscripts        Family History:  Family History   Problem Relation Age of Onset    Hypertension Mother         benign essential     Lung cancer Father     Diabetes Sister     Cancer Brother     Stroke Other         cva - due to embolism of cerebra artery        Physical Exam:     Vitals:   Blood Pressure: 143/78 (04/27/23 1603)  Pulse: 67 (04/27/23 1603)  Temperature: 97 7 °F (36 5 °C) (04/27/23 1017)  Temp Source: Oral (04/27/23 1017)  Respirations: 18 (04/27/23 1603)  SpO2: 99 % (04/27/23 1603)    Physical Exam  Constitutional:       Appearance: Normal appearance  HENT:      Head: Normocephalic and atraumatic  Nose: Nose normal    Eyes:      Extraocular Movements: Extraocular movements intact  Cardiovascular:      Rate and Rhythm: Normal rate and regular rhythm  Pulmonary:      Breath sounds: No wheezing or rales  Abdominal:      General: There is no distension  Palpations: Abdomen is soft  Tenderness: There is no abdominal tenderness  There is no right CVA tenderness or left CVA tenderness  Musculoskeletal:      Cervical back: Normal range of motion  Right lower leg: No edema  Left lower leg: No edema  Skin:     General: Skin is warm and dry  Neurological:      Mental Status: He is alert and oriented to person, place, and time     Psychiatric:         Mood and Affect: Mood normal          Behavior: Behavior normal           Additional Data:     Lab Results:  Results from last 7 days   Lab Units 04/27/23  1220   WBC Thousand/uL 6 12   HEMOGLOBIN g/dL 7 9*   HEMATOCRIT % 24 7*   PLATELETS Thousands/uL 65*   NEUTROS PCT % 69   LYMPHS PCT % 17   MONOS PCT % 7   EOS PCT % 5     Results from last 7 days   Lab Units 04/27/23  1220   SODIUM mmol/L 138   POTASSIUM mmol/L 4 4   CHLORIDE mmol/L 115*   CO2 mmol/L 18*   BUN mg/dL 64*   CREATININE mg/dL 4 06*   ANION GAP mmol/L 5   CALCIUM mg/dL 8 1*   ALBUMIN g/dL 2 9*   TOTAL BILIRUBIN mg/dL 0 17*   ALK PHOS U/L 76   ALT U/L 52   AST U/L 57*   GLUCOSE RANDOM mg/dL 144*                       Lines/Drains:  Invasive Devices     Central Venous Catheter Line  Duration           Port A Cath 06/23/22 Right Chest 308 days          Drain  Duration           Ureteral Internal Stent Right ureter 6 Fr  99 days    Chest Tube Right Pleural 8 Fr  61 days                Central Line:  Goal for removal: Port accessed  Will de-access as appropriate  Imaging: Reviewed radiology reports from this admission including: abdominal/pelvic CT  CT abdomen pelvis wo contrast   Final Result by Alon García MD (04/27 1611)      1  Overall stable exam  Right-sided ureteral stent remains in position  Chronic appearance of mild to moderate right-sided hydroureteronephrosis down to the bladder  Nonspecific bilateral perinephric and right periureteral fat stranding similar to the    prior study  Workstation performed: [id number]             EKG and Other Studies Reviewed on Admission:   · EKG: No EKG obtained  ** Please Note: This note has been constructed using a voice recognition system   **

## 2023-04-27 NOTE — ED NOTES
Pt asking to speak with provider about leaving due to not wanting to sit in ED for bed  RN explained to pt they will have a bed upstairs but unsure of time  Pt told RN he will not wait for a bed upstairs any longer  Dr Zaria Pearson made aware at this time        Lanny Nugent  04/27/23 2365

## 2023-04-27 NOTE — PLAN OF CARE
Problem: PAIN - ADULT  Goal: Verbalizes/displays adequate comfort level or baseline comfort level  Description: Interventions:  - Encourage patient to monitor pain and request assistance  - Assess pain using appropriate pain scale  - Administer analgesics based on type and severity of pain and evaluate response  - Implement non-pharmacological measures as appropriate and evaluate response  - Consider cultural and social influences on pain and pain management  - Notify physician/advanced practitioner if interventions unsuccessful or patient reports new pain  Outcome: Progressing     Problem: INFECTION - ADULT  Goal: Absence or prevention of progression during hospitalization  Description: INTERVENTIONS:  - Assess and monitor for signs and symptoms of infection  - Monitor lab/diagnostic results  - Monitor all insertion sites, i e  indwelling lines, tubes, and drains  - Monitor endotracheal if appropriate and nasal secretions for changes in amount and color  - Loop appropriate cooling/warming therapies per order  - Administer medications as ordered  - Instruct and encourage patient and family to use good hand hygiene technique  - Identify and instruct in appropriate isolation precautions for identified infection/condition  Outcome: Progressing  Goal: Absence of fever/infection during neutropenic period  Description: INTERVENTIONS:  - Monitor WBC    Outcome: Progressing     Problem: DISCHARGE PLANNING  Goal: Discharge to home or other facility with appropriate resources  Description: INTERVENTIONS:  - Identify barriers to discharge w/patient and caregiver  - Arrange for needed discharge resources and transportation as appropriate  - Identify discharge learning needs (meds, wound care, etc )  - Arrange for interpretive services to assist at discharge as needed  - Refer to Case Management Department for coordinating discharge planning if the patient needs post-hospital services based on physician/advanced practitioner order or complex needs related to functional status, cognitive ability, or social support system  Outcome: Progressing     Problem: Knowledge Deficit  Goal: Patient/family/caregiver demonstrates understanding of disease process, treatment plan, medications, and discharge instructions  Description: Complete learning assessment and assess knowledge base    Interventions:  - Provide teaching at level of understanding  - Provide teaching via preferred learning methods  Outcome: Progressing

## 2023-04-27 NOTE — ASSESSMENT & PLAN NOTE
Lab Results   Component Value Date    EGFR 13 04/27/2023    EGFR 13 04/26/2023    EGFR 25 03/28/2023    CREATININE 4 06 (H) 04/27/2023    CREATININE 4 18 (H) 04/26/2023    CREATININE 2 44 (H) 03/28/2023   · routine outpatient labs remarkable for CARLOTTA on CKD3  · Baseline creatinine is about 2 4  · Denies any complaints including diarrhea or vomiting or flank pain  · CT abdomen done in the ED is positive for persistent hydronephrosis despite stent placement on the right    · Will check bladder scan and utilize retention protocol if needed  · Check feNa and urine eosinophils  · Check tacrolimus level  · IV hydration with a bicarb infusion given low serum bicarbonate  · Consult with urology regarding the persistent hydronephrosis  · Consult with his nephrologist for assistance with management of CARLOTTA  · Avoid NSAIDS, nephrotoxins and avoid hypotension  ·

## 2023-04-27 NOTE — QUICK NOTE
Received TT message from [facility] regarding patient presenting to the emergency department for abnormal lab results  Patient was found to have a CARLOTTA with creatinine of 4 06 (baseline around 2 4) and CT imaging showing persistent mild to moderate right-sided hydronephrosis  Nonspecific bilateral perinephric and right periureteral fat stranding similar to the prior study  No hydronephrosis on the left  Patient has history of advanced bladder cancer and right ureteral obstruction and recently underwent cystoscopy with urethral dilation and right ureteral stent exchange by Dr Dunia Richard on 4/12/2023  CT imaging from today was reviewed and right ureteral stent is in good position  Per [facility] patient is asymptomatic with stable vital signs and no fever  Negative leukocytosis UA with trace leukocytes negative nitrates and large blood  Microscopic testing shows innumerable RBCs, 10-20 WBCs, and occasional bacteria  Urine culture pending  Plan:    · As patient is asymptomatic with stable vital signs and a febrile there is no indication for emergent urologic intervention at this time  Right ureteral stent appears in good position on CT imaging  · Patient to be admitted to [facility] for medical management with IV hydration  Patient will be seen by urology in the a m  for reassessment and formal consultation to discuss possible need for right percutaneous nephrostomy tube      Kaleb Gusman

## 2023-04-27 NOTE — ED PROVIDER NOTES
History  Chief Complaint   Patient presents with    Evaluation of Abnormal Diagnostic Test     Pt had blood work yesterday and was told to come to ED  Family reports she thinks his kidney levels are abnormal  Patient denies any complaints  Earnest Conde is a 74year old male PMHx liver transplant, CKD presenting to the ED for abnormal CBC/CMP per PCP  Bloodwork completed yesterday, BUN increased to 64 from 37 on 3/28, Cr increased to 4 18 from 2 44 (baseline) on 3/28  Hemoglobin low but increased to 8 3 from 7 6 on 4/12 and Hct increased to 26 2 from 24 9 on 4/12  PCP contacted him multiple times to be seen in the ED due to these levels but patient was not having symptoms  PCP finally able to convince him to be seen  Prior to Admission Medications   Prescriptions Last Dose Informant Patient Reported? Taking?    Alcohol Swabs (B-D SINGLE USE SWABS REGULAR) PADS   Yes No   Sig: USE 2-3 TIMES DAILY AS NEEDED   Blood Glucose Calibration (OT ULTRA/FASTTK CNTRL SOLN) SOLN   No No   Sig: USE AS DIRECTED   Comfort EZ Pen Needles 32G X 4 MM MISC   No No   Sig: USE 3-4 AS DIRECTED   Continuous Blood Gluc  (FreeStyle Frank 14 Day [place]) NATALIA   No No   Sig: Use 1 Device continuous   Incontinence Supply Disposable (Incontinence Brief Medium) MISC   No No   Sig: Use 4 (four) times a day   Lancet Devices (Lancing Device) MISC   No No   Sig: USE AS DIRECTED   Multiple Vitamin (multivitamin) tablet   Yes No   Sig: Take 1 tablet by mouth daily   Nutritional Supplements (Glucerna Shake) LIQD   No No   Sig: Take 1 Bottle by mouth 3 (three) times a day   OneTouch Ultra test strip   No No   Sig: TEST 3-4 TIMES A DAY   acetaminophen (TYLENOL) 325 mg tablet   No No   Sig: Take 3 tablets (975 mg total) by mouth every 8 (eight) hours   albuterol (Ventolin HFA) 90 mcg/act inhaler   No No   Sig: Inhale 2 puffs every 6 (six) hours as needed for wheezing or shortness of breath   insulin detemir (Levemir FlexTouch) 100 Units/mL "injection pen   No No   Sig: Inject 15 Units under the skin daily at bedtime   loperamide (IMODIUM) 2 mg capsule   No No   Sig: Take 1 capsule (2 mg total) by mouth 2 (two) times a day as needed for diarrhea Follansbee carmen tableta dos veces por cynthia si tiene diarrea   oxybutynin (DITROPAN) 5 mg tablet   No No   Sig: Take 1 tablet (5 mg total) by mouth 2 (two) times a day as needed (bladder spasms)   pregabalin (LYRICA) 75 mg capsule   No No   Sig: Take 1 capsule (75 mg total) by mouth 2 (two) times a day   rosuvastatin (CRESTOR) 40 MG tablet   No No   Sig: TAKE ONE (1) TABLET BY MOUTH DAILY   tacrolimus (PROGRAF) 1 mg capsule   No No   Sig: TAKE 1 CAPSULE (1 MG TOTAL) BY MOUTH EVERY 12 (TWELVE) HOURS   tamsulosin (FLOMAX) 0 4 mg   No No   Sig: Take 1 capsule (0 4 mg total) by mouth daily with dinner   temazepam (RESTORIL) 7 5 mg capsule   No No   Sig: TAKE 2 CAPSULES (15 MG TOTAL) BY MOUTH DAILY AT BEDTIME AS NEEDED FOR SLEEP   Patient taking differently: Take 15 mg by mouth daily at bedtime   zolpidem (AMBIEN) 5 mg tablet   No No   Sig: Take 1 tablet (5 mg total) by mouth daily at bedtime as needed for sleep   Patient taking differently: Take 5 mg by mouth daily at bedtime      Facility-Administered Medications Last Administration Doses Remaining   lidocaine (URO-JET, GLYDO) 2 % urethral/mucosal gel 1 application None recorded 15          Past Medical History:   Diagnosis Date    Acute urinary retention 09/12/2022    Alcoholism ([facility] [address] )     \"Sober over 20 years\"    Anemia     Arthritis     Back pain     Bladder cancer ([facility] [address] )     Bruises easily     Cerebral artery aneurysm     Change in mental state     last assessed 5/18/15; resolved 10/27/15    Chronic kidney disease     COVID-19     2022 not hospitalized fully recovered    Diabetes mellitus ([facility] )     Difficult intravenous access     Drug dependence ([facility] )     \"in the past--40yrs ago per sig other\"    Fatigue     last assessed 1/26/15; resolved 5/24/16    Full " "dentures     Hepatitis C     \"had treatment\"    History of cancer chemotherapy     History of radiation therapy     History of transfusion     per sig other \"had blood transfusion when anemic from urinary bleeding\"   [facility] discharge follow-up 12/21/2018    Hx of [facility] transplant [facility]     Hydronephrosis of right kidney     Insomnia     \"after liver transplant\"    Kidney disease     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[facility]     Liver cirrhosis ([facility]     Liver transplant recipient ([facility] )     [facility] [place]\"    Lung cancer [facility])      Hematology/oncology Dr Grandville Leyden in place     Renal disorder     Shortness of breath     \"when moving around-started on inhalers\"    Stroke (cerebrum) ([facility] )     Stroke ([facility] )     diff short term memory    Subdural hygroma     2/27/14; resolved 7/28/15    Thrombocytopenia ([facility] ) 09/20/2017    Uses Korean as primary spoken language        Past Surgical History:   Procedure Laterality Date    BRAIN SURGERY  02/12/2014    left frontotemporal 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    CATARACT EXTRACTION      CHOLECYSTECTOMY      COLONOSCOPY      [place]    FL LUMBAR PUNCTURE DIAGNOSTIC  12/14/2018    FL RETROGRADE PYELOGRAM  01/18/2023    FL RETROGRADE PYELOGRAM  4/12/2023    HEPATITIS B SURFACE AB QN,LIVER TRANSPLANT (HISTORICAL)      IR BIOPSY LUNG  02/20/2023    IR CHEST TUBE PLACEMENT  02/25/2023    IR NEPHROSTOMY TUBE CHECK/CHANGE/REPOSITION/REINSERTION/UPSIZE  07/29/2022    IR NEPHROSTOMY TUBE CHECK/CHANGE/REPOSITION/REINSERTION/UPSIZE  08/31/2022    IR NEPHROSTOMY TUBE CHECK/CHANGE/REPOSITION/REINSERTION/UPSIZE  10/07/2022    IR NEPHROSTOMY TUBE CHECK/CHANGE/REPOSITION/REINSERTION/UPSIZE  12/03/2022    IR NEPHROSTOMY TUBE PLACEMENT  05/28/2022    IR PICC LINE  12/14/2018    IR PORT PLACEMENT  06/23/2022    LIVER TRANSPLANTATION      LUNG BIOPSY      NEPHROSTOMY TUBE " CHANGE N/A 2023    Procedure: Removal of  percutaneous nephroureteral catheter;  Surgeon: Bria Vaughn MD;  [facility];  Service: Urology    WA CYSTO BLADDER W/URETERAL CATHETERIZATION Right 2023    Procedure: URETHRAL DILATION, CYSTOGRAM, CYSTOSCOPY, RIGHT RETEROGRADE PYELOGRAM, [phone], COMPLEX CHICAS PLACEMENT;  Surgeon: Bria Vaughn MD;  [facility];  Service: Urology    WA CYSTO BLADDER W/URETERAL CATHETERIZATION Right 2023    Procedure: CYSTO  W/ STENT, urethral dilation with scope;  Surgeon: Bria Vaughn MD;  [facility];  Service: Urology    WA CYSTO CALIBRATION DILAT URTL STRIX/STENOSIS N/A 2023    Procedure: DILATATION URETHRAL;  Surgeon: Bria Vaughn MD;  [facility];  Service: Urology    ROTATOR CUFF REPAIR      SHOULDER SURGERY      TRANSURETHRAL RESECTION OF BLADDER TUMOR N/A 2022    Procedure: TRANSURETHRAL RESECTION OF BLADDER TUMOR (TURBT); Surgeon: Bria Vaughn MD;  [facility];  Service: Urology       Family History   Problem Relation Age of Onset    Hypertension Mother         benign essential     Lung cancer Father     Diabetes Sister     Cancer Brother     Stroke Other         cva - due to embolism of cerebra artery      I have reviewed and agree with the history as documented      E-Cigarette/Vaping    E-Cigarette Use Never User      E-Cigarette/Vaping Substances    Nicotine No     THC No     CBD No     Flavoring No     Other No     Unknown No      Social History     Tobacco Use    Smoking status: Former     Packs/day: 1 00     Years: 35 00     Pack years: 35 00     Types: Cigarettes     Start date:      Quit date:      Years since quittin 3     Passive exposure: Past    Smokeless tobacco: Never    Tobacco comments:     former smoker per allscripts    Vaping Use    Vaping Use: Never used   Substance Use Topics    Alcohol use: Not Currently    Drug use: No     Comment: remotely quit drug use per allscripts        Review of Systems   Constitutional: Negative for appetite change, chills, fatigue and fever  Eyes: Negative for photophobia and visual disturbance  Respiratory: Negative for cough and shortness of breath  Cardiovascular: Negative for chest pain and palpitations  Gastrointestinal: Negative for abdominal pain, nausea and vomiting  Genitourinary: Negative for decreased urine volume, difficulty urinating, dysuria, flank pain, frequency, hematuria and urgency  Musculoskeletal: Negative for back pain, gait problem, myalgias, neck pain and neck stiffness  Skin: Positive for pallor  Negative for rash  Neurological: Negative for light-headedness and headaches  Psychiatric/Behavioral: Negative for confusion  Physical Exam  Physical Exam  Vitals reviewed  Constitutional:       General: He is not in acute distress  Appearance: Normal appearance  He is not ill-appearing, toxic-appearing or diaphoretic  HENT:      Head: Normocephalic and atraumatic  Right Ear: External ear normal       Left Ear: External ear normal       Nose: Nose normal       Mouth/Throat:      Mouth: Mucous membranes are moist       Pharynx: Oropharynx is clear  No oropharyngeal exudate or posterior oropharyngeal erythema  Eyes:      General: No scleral icterus  Right eye: No discharge  Left eye: No discharge  Extraocular Movements: Extraocular movements intact  Conjunctiva/sclera: Conjunctivae normal    Cardiovascular:      Rate and Rhythm: Normal rate and regular rhythm  Pulses: Normal pulses  Heart sounds: Normal heart sounds  Pulmonary:      Effort: Pulmonary effort is normal  No respiratory distress  Breath sounds: Normal breath sounds  Abdominal:      Palpations: Abdomen is soft  Tenderness: There is no abdominal tenderness   There is no right CVA tenderness, left CVA tenderness or guarding  Musculoskeletal:         General: Normal range of motion  Cervical back: Normal range of motion and neck supple  No rigidity or tenderness  Lymphadenopathy:      Cervical: No cervical adenopathy  Skin:     General: Skin is warm and dry  Capillary Refill: Capillary refill takes less than 2 seconds  Coloration: Skin is not jaundiced or pale  Findings: No erythema or rash  Neurological:      General: No focal deficit present  Mental Status: He is alert  Sensory: Sensation is intact  Motor: Motor function is intact  Coordination: Coordination is intact     Psychiatric:         Mood and Affect: Mood normal          Behavior: Behavior normal        Vital Signs  ED Triage Vitals [04/27/23 1017]   Temperature Pulse Respirations Blood Pressure SpO2   97 7 °F (36 5 °C) 72 18 110/65 100 %      Temp Source Heart Rate Source Patient Position - Orthostatic VS BP Location FiO2 (%)   Oral Monitor Sitting Left arm --      Pain Score       No Pain         Vitals:    04/27/23 1017 04/27/23 1258 04/27/23 1603   BP: 110/65 134/70 143/78   Pulse: 72 61 67   Patient Position - Orthostatic VS: Sitting  Lying     Visual Acuity    ED Medications  Medications - No data to display    Diagnostic Studies  Results Reviewed     Procedure Component Value Units Date/Time    Tacrolimus level [[id number]]     Lab Status: No result Specimen: Blood     Creatinine, urine, random [[id number]]     Lab Status: No result Specimen: Urine     Sodium, urine, random [[id number]]     Lab Status: No result Specimen: Urine     Urine Eosinophils [[id number]]     Lab Status: No result Specimen: Urine     Comprehensive metabolic panel [[id number]]  (Abnormal) Collected: 04/27/23 1220    Lab Status: Final result Specimen: Blood from Central Venous Line Updated: 04/27/23 1319     Sodium 138 mmol/L      Potassium 4 4 mmol/L      Chloride 115 mmol/L      CO2 18 mmol/L      ANION GAP 5 mmol/L      BUN 64 mg/dL Creatinine 4 06 mg/dL      Glucose 144 mg/dL      Calcium 8 1 mg/dL      Corrected Calcium 9 0 mg/dL      AST 57 U/L      ALT 52 U/L      Alkaline Phosphatase 76 U/L      Total Protein 7 1 g/dL      Albumin 2 9 g/dL      Total Bilirubin 0 17 mg/dL      eGFR 13 ml/min/1 73sq m     Narrative:      [place] guidelines for Chronic Kidney Disease (CKD):     Stage 1 with normal or high GFR (GFR > 90 mL/min/1 73 square meters)    Stage 2 Mild CKD (GFR = 60-89 mL/min/1 73 square meters)    Stage 3A Moderate CKD (GFR = 45-59 mL/min/1 73 square meters)    Stage 3B Moderate CKD (GFR = 30-44 mL/min/1 73 square meters)    Stage 4 Severe CKD (GFR = 15-29 mL/min/1 73 square meters)    Stage 5 End Stage CKD (GFR <15 mL/min/1 73 square meters)  Note: GFR calculation is accurate only with a steady state creatinine    Urine Microscopic [[id number]]  (Abnormal) Collected: 04/27/23 1226    Lab Status: Final result Specimen: Urine, Clean Catch Updated: 04/27/23 1308     RBC, UA Innumerable /hpf      WBC, UA 10-20 /hpf      Epithelial Cells Occasional /hpf      Bacteria, UA Occasional /hpf      MUCUS THREADS Occasional     Budding Yeast Present    Urine culture [[id number]] Collected: 04/27/23 1226    Lab Status:  In process Specimen: Urine, Clean Catch Updated: 04/27/23 1308    CBC and differential [[id number]]  (Abnormal) Collected: 04/27/23 1220    Lab Status: Final result Specimen: Blood from Central Venous Line Updated: 04/27/23 1249     WBC 6 12 Thousand/uL      RBC 3 23 Million/uL      Hemoglobin 7 9 g/dL      Hematocrit 24 7 %      MCV 77 fL      MCH 24 5 pg      MCHC 32 0 g/dL      RDW 16 9 %      MPV 12 5 fL      Platelets 65 Thousands/uL      nRBC 0 /100 WBCs      Neutrophils Relative 69 %      Immat GRANS % 1 %      Lymphocytes Relative 17 %      Monocytes Relative 7 %      Eosinophils Relative 5 %      Basophils Relative 1 %      Neutrophils Absolute 4 25 Thousands/µL      Immature Grans Absolute 0 03 Thousand/uL      Lymphocytes Absolute 1 03 Thousands/µL      Monocytes Absolute 0 45 Thousand/µL      Eosinophils Absolute 0 33 Thousand/µL      Basophils Absolute 0 03 Thousands/µL     UA w Reflex to Microscopic w Reflex to Culture [[id number]]  (Abnormal) Collected: 04/27/23 1226    Lab Status: Final result Specimen: Urine, Clean Catch Updated: 04/27/23 1249     Color, UA Light Yellow     Clarity, UA Turbid     Specific [place], UA 1 013     pH, UA 5 5     Leukocytes, UA Small     Nitrite, UA Negative     Protein, UA 70 (1+) mg/dl      Glucose,  (3/10%) mg/dl      Ketones, UA Negative mg/dl      Urobilinogen, UA <2 0 mg/dl      Bilirubin, UA Negative     Occult Blood, UA Large            CT abdomen pelvis wo contrast   Final Result by Courtney Pelayo MD (04/27 1611)      1  Overall stable exam  Right-sided ureteral stent remains in position  Chronic appearance of mild to moderate right-sided hydroureteronephrosis down to the bladder  Nonspecific bilateral perinephric and right periureteral fat stranding similar to the    prior study  Workstation performed: [id number]                Procedures  Procedures     ED Course  ED Course as of 04/27/23 1636   Thu Apr 27, 2023   1251 Hemoglobin(!): 7 9  Was 8 3 yesterday  No sources of bleeding identified aside from in the urine   1310 RBC, UA(!): Innumerable   1310 WBC, UA(!): 10-20   1325 BUN(!): 64   1325 Creatinine(!): 4 06   1330 Pt and family updated  [facility] requesting imaging prior to admission  56 Pt and family updated  18 CT abdomen pelvis wo contrast  Overall stable exam  Right-sided ureteral stent remains in position  Chronic appearance of mild to moderate right-sided hydroureteronephrosis down to the bladder  Nonspecific bilateral perinephric and right periureteral fat stranding similar to the   prior study  1613 Reaching out to [facility] admission again  [id number] Accepted for inpatient admission  46 Pt and family updated  Medical Decision Making  74year old male PMHx CKD presenting for abnormal lab results indicating worsening renal function and hemoglobin  Pt recently had ureteral stents placed  Pt endorses no symptoms  Physical exam is benign  CBC - Hgb 7 9 (decreased from 8 3 yesterday)  CMP - BUN 64, Cr 4 06, eGFR 13  UA - small leukocytes, some protein, 300 glucose, large blood  UA microscopy - innumerable RBC, 10-20 WBC, budding yeast    Spoke with [facility] admitting physician, Dr Bruce Ruiz, to attempt to admit for CARLOTTA  Dr Bruce Ruiz requested imaging to confirm patency of the ureteral stent prior to considering admission  CT abdomen pelvis wo contrast: Right-sided ureteral stent remains in position  Chronic appearance of mild to moderate right-sided hydroureteronephrosis down to the bladder  Cortical thinning of the right kidney, stable  Nonspecific bilateral perinephric and right   periureteral fat stranding similar to the prior study  No hydronephrosis on the left  Spoke with Dr Bruce Ruiz again who accepted patient for inpatient admission  No medication provided throughout the ED visit as pt complained of no symptoms  Anemia:     Details: Asymptomatic  Budding yeast detected:     Details: Detected on microscopy  Hydroureteronephrosis:     Details: Right sided  Inguinal hernia:     Details: Left sided, fat containing  Pulmonary nodule:     Details: 3 mm in the right lower lobe  Amount and/or Complexity of Data Reviewed  External Data Reviewed: labs, radiology, ECG and notes  Details: Yesterday's CBC/CMP worsening from March labs, compared to today's values  Labs: ordered  Decision-making details documented in ED Course  Radiology: ordered  Decision-making details documented in ED Course    Discussion of management or test interpretation with external provider(s): Discussed case prior to CT imaging with [facility] admitting attending Dr Frank Joya who wants to confirm urinary stent patency prior to admission  Once CT completed, discussed results with Dr Lorenzo Child again who accepted pt for inpatient admission  Risk  Decision regarding hospitalization            Disposition  Final diagnoses:   Acute kidney injury ([facility] )   Pulmonary nodule - 3mm right lower lobe   Ureteral stent present   Hydroureteronephrosis - right sided   Inguinal hernia - left sided, fat containing   Asymptomatic microscopic hematuria   Budding yeast detected - urine microscopy, aymptomatic   Anemia     Time reflects when diagnosis was documented in both MDM as applicable and the Disposition within this note     Time User Action Codes Description Comment    4/27/2023  4:15 PM Ana Bennetts Add [N17 9] Acute kidney injury ([facility] )     4/27/2023  4:19 PM Blanqiuta De La Cruz Add [N13 39] Other hydronephrosis     4/27/2023  4:23 PM Ana Bennetts Add [R91 1] Pulmonary nodule     4/27/2023  4:23 PM Ana Bennetts Modify [R91 1] Pulmonary nodule 3mm right lower lobe    4/27/2023  4:23 PM Ana Bennetts Add [Z96 0] Ureteral stent present     4/27/2023  4:24 PM Ana Bennetts Add [N13 30] Hydroureteronephrosis     4/27/2023  4:24 PM Ana Bennetts Modify [N13 30] Hydroureteronephrosis right sided    4/27/2023  4:25 PM Ana Bennetts Add [K40 90] Inguinal hernia     4/27/2023  4:25 PM Ana Bennetts Modify [K40 90] Inguinal hernia left sided    4/27/2023  4:25 PM Ana Bennetts Modify [K40 90] Inguinal hernia left sided, fat containing    4/27/2023  4:26 PM Ana Bennetts Add [R31 21] Asymptomatic microscopic hematuria     4/27/2023  4:26 PM CyJeanette tobiaser Add [B37 9] Budding yeast detected     4/27/2023  4:27 PM Jeanette Benoit Modify [B37 9] Budding yeast detected urine microscopy, aymptomatic    4/27/2023  4:27 PM Ana Bennetts Add [D64 9] Low hemoglobin     4/27/2023  4:27 PM Ana Bennetts Add [D64 9] Anemia     4/27/2023  4:27 PM Beauregard, Jolene Homans [D64 9] Low hemoglobin       ED Disposition     ED Disposition   Admit    Condition   Stable    Date/Time   u Apr 27, 2023  4:16 PM    Comment   Case was discussed with Paloma Hall and the patient's admission status was agreed to be Admission Status: inpatient status to the service of Dr Neelam Gallardo   Follow-up Information    None         Patient's Medications   Discharge Prescriptions    No medications on file       No discharge procedures on file      PDMP Review       Value Time User    PDMP Reviewed  Yes 3/28/2023 10:45 AM Luis Alberto Solorio DO          ED Provider  Electronically Signed by           Huber Zheng PA-C  04/27/23 1925

## 2023-04-27 NOTE — TELEPHONE ENCOUNTER
Spoke to patient as well as Tiburcio Chow we had referred him multiple times to go to the hospital and patient had not gone  Per Dr Renard Desai patient needs to go to the hospital   I advised patient of orders he said he will go today  Dr Renard Desai advised

## 2023-04-27 NOTE — PROGRESS NOTES
The patient had lab work done and creatinine is increased to greater than 4 and a month ago he was at his baseline of 2 4  I contacted him and spoke with his wife and he has been doing okay reviewing the chart quickly it looks like he has had a urologic procedure a couple weeks ago changing stent  The patient was contacted by the family physician who had recommended going to the emergency room to be evaluated  They should repeat lab work and also image the kidneys to make sure there is no obstruction  If there is obviously urology will get involved

## 2023-04-27 NOTE — ASSESSMENT & PLAN NOTE
· Anemia and thrombocytopenia  · Anemia is likely multifactorial  · Multiple blood draws  · Chronic illness/cancer  · Chronic kidney disease  · Iron deficiency  · Thrombocytopenia is also chronic  · Monitor CBC closely while on heparin  · Check iron studies  · Transfuse PRN  · May be a candidate for EPO, will defer to nephrology

## 2023-04-28 LAB
ANION GAP SERPL CALCULATED.3IONS-SCNC: 3 MMOL/L (ref 4–13)
BUN SERPL-MCNC: 54 MG/DL (ref 5–25)
CALCIUM SERPL-MCNC: 7.6 MG/DL (ref 8.3–10.1)
CHLORIDE SERPL-SCNC: 112 MMOL/L (ref 96–108)
CO2 SERPL-SCNC: 24 MMOL/L (ref 21–32)
CREAT SERPL-MCNC: 3.62 MG/DL (ref 0.6–1.3)
ERYTHROCYTE [DISTWIDTH] IN BLOOD BY AUTOMATED COUNT: 16.5 % (ref 11.6–15.1)
GFR SERPL CREATININE-BSD FRML MDRD: 15 ML/MIN/1.73SQ M
GLUCOSE SERPL-MCNC: 100 MG/DL (ref 65–140)
GLUCOSE SERPL-MCNC: 142 MG/DL (ref 65–140)
GLUCOSE SERPL-MCNC: 147 MG/DL (ref 65–140)
GLUCOSE SERPL-MCNC: 169 MG/DL (ref 65–140)
GLUCOSE SERPL-MCNC: 48 MG/DL (ref 65–140)
GLUCOSE SERPL-MCNC: 86 MG/DL (ref 65–140)
HCT VFR BLD AUTO: 22.8 % (ref 36.5–49.3)
HGB BLD-MCNC: 7.4 G/DL (ref 12–17)
MCH RBC QN AUTO: 24.6 PG (ref 26.8–34.3)
MCHC RBC AUTO-ENTMCNC: 32.5 G/DL (ref 31.4–37.4)
MCV RBC AUTO: 76 FL (ref 82–98)
PLATELET # BLD AUTO: 58 THOUSANDS/UL (ref 149–390)
POTASSIUM SERPL-SCNC: 3.5 MMOL/L (ref 3.5–5.3)
RBC # BLD AUTO: 3.01 MILLION/UL (ref 3.88–5.62)
SODIUM SERPL-SCNC: 139 MMOL/L (ref 135–147)
WBC # BLD AUTO: 4.66 THOUSAND/UL (ref 4.31–10.16)

## 2023-04-28 RX ORDER — DEXTROSE MONOHYDRATE 25 G/50ML
INJECTION, SOLUTION INTRAVENOUS
Status: COMPLETED
Start: 2023-04-28 | End: 2023-04-28

## 2023-04-28 RX ORDER — SODIUM CHLORIDE, SODIUM GLUCONATE, SODIUM ACETATE, POTASSIUM CHLORIDE, AND MAGNESIUM CHLORIDE 526; 502; 368; 37; 30 MG/100ML; MG/100ML; MG/100ML; MG/100ML; MG/100ML
75 INJECTION, SOLUTION INTRAVENOUS CONTINUOUS
Status: DISCONTINUED | OUTPATIENT
Start: 2023-04-28 | End: 2023-04-28 | Stop reason: SDUPTHER

## 2023-04-28 RX ORDER — SODIUM CHLORIDE, SODIUM GLUCONATE, SODIUM ACETATE, POTASSIUM CHLORIDE, MAGNESIUM CHLORIDE, SODIUM PHOSPHATE, DIBASIC, AND POTASSIUM PHOSPHATE .53; .5; .37; .037; .03; .012; .00082 G/100ML; G/100ML; G/100ML; G/100ML; G/100ML; G/100ML; G/100ML
75 INJECTION, SOLUTION INTRAVENOUS CONTINUOUS
Status: DISCONTINUED | OUTPATIENT
Start: 2023-04-28 | End: 2023-04-30 | Stop reason: HOSPADM

## 2023-04-28 RX ADMIN — ZOLPIDEM TARTRATE 5 MG: 5 TABLET ORAL at 21:45

## 2023-04-28 RX ADMIN — DEXTROSE MONOHYDRATE 25 ML: 25 INJECTION, SOLUTION INTRAVENOUS at 07:41

## 2023-04-28 RX ADMIN — INSULIN LISPRO 1 UNITS: 100 INJECTION, SOLUTION INTRAVENOUS; SUBCUTANEOUS at 17:03

## 2023-04-28 RX ADMIN — B-COMPLEX W/ C & FOLIC ACID TAB 1 TABLET: TAB at 08:04

## 2023-04-28 RX ADMIN — SODIUM CHLORIDE, SODIUM GLUCONATE, SODIUM ACETATE, POTASSIUM CHLORIDE, MAGNESIUM CHLORIDE, SODIUM PHOSPHATE, DIBASIC, AND POTASSIUM PHOSPHATE 75 ML/HR: .53; .5; .37; .037; .03; .012; .00082 INJECTION, SOLUTION INTRAVENOUS at 11:37

## 2023-04-28 RX ADMIN — INSULIN DETEMIR 15 UNITS: 100 INJECTION, SOLUTION SUBCUTANEOUS at 21:46

## 2023-04-28 RX ADMIN — PREGABALIN 75 MG: 75 CAPSULE ORAL at 17:05

## 2023-04-28 RX ADMIN — TACROLIMUS 1 MG: 1 CAPSULE ORAL at 23:32

## 2023-04-28 RX ADMIN — PREGABALIN 75 MG: 75 CAPSULE ORAL at 08:04

## 2023-04-28 RX ADMIN — TEMAZEPAM 15 MG: 15 CAPSULE ORAL at 21:45

## 2023-04-28 RX ADMIN — TACROLIMUS 1 MG: 1 CAPSULE ORAL at 08:05

## 2023-04-28 RX ADMIN — TAMSULOSIN HYDROCHLORIDE 0.4 MG: 0.4 CAPSULE ORAL at 17:05

## 2023-04-28 NOTE — ASSESSMENT & PLAN NOTE
Lab Results   Component Value Date    HGBA1C 7 4 (A) 12/06/2022       Recent Labs     04/27/23  2047 04/28/23  0710 04/28/23  0759 04/28/23  1103   POCGLU 204* 48* 142* 86       Blood Sugar Average: Last 72 hrs:  · (P) 125 2renal diet for now  · Continue home dose of basal insulin  · fingersticks QID with sliding scale coverage  · Add prandial insulin if indicated

## 2023-04-28 NOTE — CONSULTS
Consultation - Nephrology   Babs Beatty 74 y o  male MRN: [id number]  Unit/Bed#: [facility] 801-01 Encounter: [id number]    ASSESSMENT and PLAN:  Acute kidney injury (POA):  Etiology: Suspect prerenal in the setting of possible decreased oral intake, elective hypotension long with ongoing obstructive uropathy given history of bladder cancer and known hydronephrosis  Assessment:   After review medical records through [facility] and [facility] everywhere baseline creatinine 2 4-3 0 dating back to 2021 with EGFR low 20   Admission creatinine 4 06   Current creatinine 3 62-slowly improving with hydration-suggestive of prerenal   Patient follows with Dr Ashley Umaña On IV bicarb drip  Workup:   Urinalysis with micro reports: Positive glucose, large blood, small leukocytes, +1 protein, innumerable RBCs, 10-20 WBCs   Urine sodium 81   Renal imaging with CAT scan revealed: Chronic appearance of mild to moderate right-sided hydroureternephrosis with right-sided ureteral stent in position   Bladder scan 56 ml  Plan:  · We will change to Plasma-Lyte at 75 cc an hour   Avoid nephrotoxins, NSAIDs, IV contrast if possible   Avoid hypotension or perturbations of blood pressure during decreased renal perfusion   Trend BMP   Adjust meds to appropriate GFR   Optimize hemodynamic status to avoid delay in renal recovery   If renal function worsens may need to consider Webb catheter    Chronic kidney disease IV:    Etiology:  Suspect secondary to obstructive uropathy complicated by nephrosclerosis nephron loss with atrophic right kidney as well as possible calcineurin inhibitor toxicity that he takes for immunosuppression   Assessment and Plan:   After review of medical records for [facility] everywhere baseline creatinine 2 4-3 0   Follows with Dr Ashley Umaña Recommend follow-up on discharge for long-term management    Blood pressure/Hypertension:  Assessment and Plan:   Current blood pressure acceptable   Home medications: "none   Current medications: none   Maximize hemodynamics to maintain MAP >65   Avoid hypotension or fluctuations in blood pressure   Will continue to trend    Anemia: The setting of CKD along with malignancy  Assessment and Plan:   Current hemoglobin 7 4    No recent iron stores since October 2022   Consider checking iron stores   Per primary team   Transfuse for Hgb <7 0    Acid-base: Metabolic acidosis  Assessment and Plan:   Improved with IV bicarb drip (18 on admission)   Likely in the setting of acute kidney injury   Change to Plasma-Lyte at 75 cc an hour   Current Bicarb 24   Continue to monitor     Liver transplant  Assessment and Plan:   Will check tacrolimus level in a m  for more accurate result   Continues on tacrolimus 1 mg every 12 hours   Follows [facility] for transplant      HISTORY OF PRESENT ILLNESS:  Requesting Physician: Alphonso Mckee MD  Reason for Consult: CARLOTTA on CKD IV    Ronen Howell is a 74 y o  male with past medical history of CKD IV, IDDM, alcohol cirrhosis status post liver transplantation 2010, hepatitis C status posttreatment, CAD, nephrolithiasis, 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 (previous nephrostomy tube) who presented to the ER from nephrology recommendation due abnormal labs with elevated creatinine  A renal consultation is requested today for assistance in the management of acute kidney injury  On discussion, patient reports feels fine and reports does not have any issues       PAST MEDICAL HISTORY:  Past Medical History:   Diagnosis Date    Acute urinary retention 09/12/2022    Alcoholism ([address] )     \"Sober over 21 years\"    Anemia     Arthritis     Back pain     Bladder cancer ([address] )     Bruises easily     Cerebral artery aneurysm     Change in mental state     last assessed 5/18/15; resolved 10/27/15    Chronic kidney disease     COVID-19     2022 not hospitalized fully recovered    Diabetes mellitus ([address] )     Difficult intravenous " "access     Drug dependence ([facility] )     \"in the past--40yrs ago per sig other\"    Fatigue     last assessed 1/26/15; resolved 5/24/16    Full dentures     Hepatitis C     \"had treatment\"    History of cancer chemotherapy     History of radiation therapy     History of transfusion     per sig other \"had blood transfusion when anemic from urinary bleeding\"   [facility] discharge follow-up 12/21/2018    Hx of liver transplant ([facility] )     Hydronephrosis of right kidney     Insomnia     \"after liver transplant\"    Kidney disease     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[facility] )     Liver cirrhosis ([facility] )     Liver transplant recipient ([facility] )     Aisha Thend[facility]\"    Lung cancer [facility])      Hematology/oncology Dr Vivien Gibson in place     Renal disorder     Shortness of breath     \"when moving around-started on inhalers\"    Stroke (cerebrum) ([facility] )     Stroke ([facility] )     diff short term memory    Subdural hygroma     2/27/14; resolved 7/28/15    Thrombocytopenia ([facility] ) 09/20/2017    Uses Peruvian as primary spoken language        PAST SURGICAL HISTORY:  Past Surgical History:   Procedure Laterality Date    BRAIN SURGERY  02/12/2014    left frontotemporal 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    CATARACT EXTRACTION      CHOLECYSTECTOMY      COLONOSCOPY      [place]    [place]    FL LUMBAR PUNCTURE DIAGNOSTIC  12/14/2018    FL RETROGRADE PYELOGRAM  01/18/2023    FL RETROGRADE PYELOGRAM  4/12/2023    HEPATITIS B SURFACE AB QN,LIVER TRANSPLANT (HISTORICAL)      IR BIOPSY LUNG  02/20/2023    IR CHEST TUBE PLACEMENT  02/25/2023    IR NEPHROSTOMY TUBE CHECK/CHANGE/REPOSITION/REINSERTION/UPSIZE  07/29/2022    IR NEPHROSTOMY TUBE CHECK/CHANGE/REPOSITION/REINSERTION/UPSIZE  08/31/2022    IR NEPHROSTOMY TUBE CHECK/CHANGE/REPOSITION/REINSERTION/UPSIZE  10/07/2022    IR NEPHROSTOMY TUBE CHECK/CHANGE/REPOSITION/REINSERTION/UPSIZE  12/03/2022    IR " NEPHROSTOMY TUBE PLACEMENT  05/28/2022    IR PICC LINE  12/14/2018    IR PORT PLACEMENT  06/23/2022    LIVER TRANSPLANTATION      LUNG BIOPSY      NEPHROSTOMY TUBE CHANGE N/A 01/18/2023    Procedure: Removal of  percutaneous nephroureteral catheter;  Surgeon: Santi Elmore MD;  [facility];  Service: Urology    NC CYSTO BLADDER W/URETERAL CATHETERIZATION Right 01/18/2023    Procedure: URETHRAL DILATION, CYSTOGRAM, CYSTOSCOPY, RIGHT RETEROGRADE PYELOGRAM, [phone], COMPLEX CHICAS PLACEMENT;  Surgeon: Santi Elmore MD;  [facility];  Service: Urology    NC CYSTO BLADDER W/URETERAL CATHETERIZATION Right 4/12/2023    Procedure: CYSTO  W/ STENT, urethral dilation with scope;  Surgeon: Santi Elmore MD;  [facility];  Service: Urology    NC CYSTO CALIBRATION DILAT URTL STRIX/STENOSIS N/A 4/12/2023    Procedure: DILATATION URETHRAL;  Surgeon: Santi Elmore MD;  [facility];  Service: Urology    ROTATOR CUFF REPAIR      SHOULDER SURGERY      TRANSURETHRAL RESECTION OF BLADDER TUMOR N/A 05/26/2022    Procedure: TRANSURETHRAL RESECTION OF BLADDER TUMOR (TURBT); Surgeon: Santi Elmore MD;  [facility];  Service: Urology       ALLERGIES:  Allergies   Allergen Reactions    Tequin [Gatifloxacin] Rash    Lipitor [Atorvastatin] Hives and Other (See Comments)     Rash and itching    Ciprofloxacin Rash    Iohexol Hives and Other (See Comments)     Contraindication with rosuvastatin       Iv Contrast [Iodinated Contrast Media] Rash and Other (See Comments)     Unknown    Levofloxacin Rash       SOCIAL HISTORY:  Social History     Substance and Sexual Activity   Alcohol Use Not Currently     Social History     Substance and Sexual Activity   Drug Use No    Comment: remotely quit drug use per allscripts      Social History     Tobacco Use   Smoking Status Former    Packs/day: 1 00    Years: 35 00    Pack years: 35 00    Types: Cigarettes    Start date:     Quit date:     Years since quittin 3    Passive exposure: Past   Smokeless Tobacco Never   Tobacco Comments    former smoker per allscripts        FAMILY HISTORY:  Family History   Problem Relation Age of Onset    Hypertension Mother         benign essential     Lung cancer Father     Diabetes Sister     Cancer Brother     Stroke Other         cva - due to embolism of cerebra artery        MEDICATIONS:    Current Facility-Administered Medications:     acetaminophen (TYLENOL) tablet 650 mg, 650 mg, Oral, Q6H PRN, Trudi Szymanski MD    albuterol (PROVENTIL HFA,VENTOLIN HFA) inhaler 2 puff, 2 puff, Inhalation, Q6H PRN, Trudi Szymanski MD    insulin detemir (LEVEMIR) subcutaneous injection 15 Units, 15 Units, Subcutaneous, HS, Trudi Szymanski MD, 15 Units at 23    insulin lispro (HumaLOG) 100 units/mL subcutaneous injection 1-5 Units, 1-5 Units, Subcutaneous, TID AC **AND** Fingerstick Glucose (POCT), , , TID AC, Trudi Szymanski MD    insulin lispro (HumaLOG) 100 units/mL subcutaneous injection 1-5 Units, 1-5 Units, Subcutaneous, HS, Trudi Szymanski MD, 1 Units at 23    multivitamin stress formula tablet 1 tablet, 1 tablet, Oral, Daily, Trudi Szymanski MD, 1 tablet at 23 0804    oxybutynin (DITROPAN) tablet 5 mg, 5 mg, Oral, BID PRN, Trudi Szymanski MD    pregabalin (LYRICA) capsule 75 mg, 75 mg, Oral, BID, Trudi Szymanski MD, 75 mg at 23 0804    sodium bicarbonate 150 mEq in dextrose 5 % 1,000 mL infusion, 75 mL/hr, Intravenous, Continuous, Trudi Szymanski MD, Last Rate: 75 mL/hr at 23 180, 75 mL/hr at 23 180    tacrolimus (PROGRAF) capsule 1 mg, 1 mg, Oral, Q12H, Trudi Szymanski MD, 1 mg at 23 08    tamsulosin (FLOMAX) capsule 0 4 mg, 0 4 mg, Oral, Daily With Ludivina Kellogg MD, 0 4 mg at 23 180    temazepam (RESTORIL) capsule 15 mg, 15 mg, Oral, HS, Trudi Szymanski MD, 15 mg at 23 3057   zolpidem (AMBIEN) tablet 5 mg, 5 mg, Oral, HS, Paloma Hall MD, 5 mg at 04/27/23 9470    Facility-Administered Medications Ordered in Other Encounters:     [MAR Hold] alteplase (CATHFLO) injection 2 mg, 2 mg, Intracatheter, Q1MIN PRN, Dakota Latham MD    REVIEW OF SYSTEMS:  Patient seen and examined at bedside  Review of Systems   Constitutional: Negative for activity change, appetite change, chills, diaphoresis and fever  HENT: Negative  Negative for congestion and facial swelling  Eyes: Negative  Respiratory: Negative  Cardiovascular: Negative  Gastrointestinal: Negative  Endocrine: Negative  Genitourinary: Negative  Musculoskeletal: Negative  Skin: Negative  Allergic/Immunologic: Negative  Neurological: Negative  Hematological: Negative  Psychiatric/Behavioral: Negative  PHYSICAL EXAM:  Current Weight: Weight - Scale: 57 2 kg (126 lb)  First Weight: Weight - Scale: 57 2 kg (126 lb)  Vitals:    04/28/23 0644 04/28/23 0645 04/28/23 0653 04/28/23 0800   BP: 114/63 114/63 117/63    BP Location:       Pulse: 58 56 56 55   Resp:  16 16    Temp: 97 8 °F (36 6 °C) 97 8 °F (36 6 °C) 98 °F (36 7 °C)    TempSrc:       SpO2: 99% 99% 98% 98%   Weight:       Height:         No intake or output data in the 24 hours ending 04/28/23 1109    Physical Exam  Vitals and nursing note reviewed  Constitutional:       Comments: Thin, NAD   HENT:      Head: Normocephalic and atraumatic  Nose: Nose normal       Mouth/Throat:      Mouth: Mucous membranes are moist       Pharynx: Oropharynx is clear  Eyes:      Extraocular Movements: Extraocular movements intact  Conjunctiva/sclera: Conjunctivae normal    Cardiovascular:      Rate and Rhythm: Normal rate and regular rhythm  Pulses: Normal pulses  Heart sounds: Normal heart sounds  Pulmonary:      Effort: Pulmonary effort is normal       Breath sounds: Normal breath sounds     Abdominal:      General: Bowel sounds are normal       Palpations: Abdomen is soft  Musculoskeletal:         General: Normal range of motion  Cervical back: Normal range of motion and neck supple  Skin:     General: Skin is warm and dry  Neurological:      General: No focal deficit present  Mental Status: He is alert and oriented to person, place, and time  Psychiatric:         Mood and Affect: Mood normal          Behavior: Behavior normal          Thought Content:  Thought content normal                   Lab Results:   Results from last 7 days   Lab Units 04/28/23  0451 04/27/23  1220 04/26/23  1321   WBC Thousand/uL 4 66 6 12 6 25   HEMOGLOBIN g/dL 7 4* 7 9* 8 3*   HEMATOCRIT % 22 8* 24 7* 26 2*   PLATELETS Thousands/uL 58* 65* 76*   SODIUM mmol/L 139 138 138   POTASSIUM mmol/L 3 5 4 4 4 5   CHLORIDE mmol/L 112* 115* 117*   CO2 mmol/L 24 18* 18*   BUN mg/dL 54* 64* 64*   CREATININE mg/dL 3 62* 4 06* 4 18*   CALCIUM mg/dL 7 6* 8 1* 8 2*   ALK PHOS U/L  --  76  --    ALT U/L  --  52  --    AST U/L  --  57*  --

## 2023-04-28 NOTE — ASSESSMENT & PLAN NOTE
Lab Results   Component Value Date    EGFR 15 04/28/2023    EGFR 13 04/27/2023    EGFR 13 04/26/2023    CREATININE 3 62 (H) 04/28/2023    CREATININE 4 06 (H) 04/27/2023    CREATININE 4 18 (H) 04/26/2023   · routine outpatient labs remarkable for CARLOTTA on CKD3  · Baseline creatinine is about 2 4  · Denies any complaints including diarrhea or vomiting or flank pain  · CT abdomen done in the ED is positive for persistent hydronephrosis despite stent placement on the right  · Will check bladder scan and utilize retention protocol if needed  · Check feNa and urine eosinophils  · Check tacrolimus level  · Urology following  · Avoid NSAIDS, nephrotoxins and avoid hypotension  · Nephro consult appreciated  Plan to switch bicarb drip to plasmalyte today  · Urology consult appreciated

## 2023-04-28 NOTE — PROGRESS NOTES
[facility]  Progress Note  Name: Mina Villasenor  MRN: [id number]  Unit/Bed#: [facility] 801-01 I Date of Admission: 4/27/2023   Date of Service: 4/28/2023 I Hospital Day: 1    Assessment/Plan   * Acute renal failure superimposed on stage 3 chronic kidney disease [facility])  Assessment & Plan  Lab Results   Component Value Date    EGFR 15 04/28/2023    EGFR 13 04/27/2023    EGFR 13 04/26/2023    CREATININE 3 62 (H) 04/28/2023    CREATININE 4 06 (H) 04/27/2023    CREATININE 4 18 (H) 04/26/2023   · routine outpatient labs remarkable for CARLOTTA on CKD3  · Baseline creatinine is about 2 4  · Denies any complaints including diarrhea or vomiting or flank pain  · CT abdomen done in the ED is positive for persistent hydronephrosis despite stent placement on the right  · Will check bladder scan and utilize retention protocol if needed  · Check feNa and urine eosinophils  · Check tacrolimus level  · Urology following  · Avoid NSAIDS, nephrotoxins and avoid hypotension  · Nephro consult appreciated  Plan to switch bicarb drip to plasmalyte today  · Urology consult appreciated       Bicytopenia  Assessment & Plan  · Anemia and thrombocytopenia  · Anemia is likely multifactorial  · Multiple blood draws  · Chronic illness/cancer  · Chronic kidney disease  · Iron deficiency  · Thrombocytopenia is also chronic  · Monitor CBC closely while on heparin  · Check iron studies  · Transfuse PRN  · May be a candidate for EPO, will defer to nephrology    History of hydronephrosis  Assessment & Plan  · Recent cystoscopy and stent with urethral dilation  · Plan as above    Type 2 diabetes mellitus with diabetic peripheral angiopathy without gangrene, with long-term current use of insulin [facility])  Assessment & Plan  Lab Results   Component Value Date    HGBA1C 7 4 (A) 12/06/2022       Recent Labs     04/27/23  2047 04/28/23  0710 04/28/23  0759 04/28/23  1103   POCGLU 204* 48* 142* 86       Blood Sugar Average: Last 72 hrs:  · (P) 125 2renal diet for now  · Continue home dose of basal insulin  · fingersticks QID with sliding scale coverage  · Add prandial insulin if indicated    Peripheral neuropathy  Assessment & Plan  · Continue lyrica    Thrombocytopenia (HCC)  Assessment & Plan  Chronic thrombocytopenia  Currently 54  No bleeding reported  Monitor  Hold pharmacological DVT ppx    Liver transplant status ([facility] [address] )  Assessment & Plan  · LFTs appear stable  · Continue tacrolimus at 1mg BID  · Check tacrolimus level as mentioned above              VTE Pharmacologic Prophylaxis:   Pharmacologic: Pharmacologic VTE Prophylaxis contraindicated due to thrombocytopenia  Mechanical VTE Prophylaxis in Place: Yes    Patient Centered Rounds: I have performed bedside rounds with nursing staff today  Discussions with Specialists or Other Care Team Provider: nephrology    Education and Discussions with Family / Patient: discussed with family at bedside       Current Length of Stay: 1 day(s)    Current Patient Status: Inpatient   Certification Statement: The patient will continue to require additional inpatient hospital stay due to pendign improvement in creatinine    Discharge Plan: 24-48 hours pending urology/nephro clearance    Code Status: Level 1 - Full Code      Subjective:   Patient eating and drinking well  No hematuria reported  No abdominal pain reported    Objective:     Vitals:   Temp (24hrs), Av 1 °F (36 7 °C), Min:97 8 °F (36 6 °C), Max:98 3 °F (36 8 °C)    Temp:  [97 8 °F (36 6 °C)-98 3 °F (36 8 °C)] 98 °F (36 7 °C)  HR:  [55-67] 55  Resp:  [16-18] 16  BP: (114-143)/(63-78) 117/63  SpO2:  [98 %-100 %] 98 %  Body mass index is 23 05 kg/m²  Input and Output Summary (last 24 hours): Intake/Output Summary (Last 24 hours) at 2023 1129  Last data filed at 2023 1125  Gross per 24 hour   Intake 1296 25 ml   Output --   Net 1296 25 ml       Physical Exam:     Physical Exam  Vitals and nursing note reviewed  Constitutional:       General: He is not in acute distress  Appearance: Normal appearance  HENT:      Head: Normocephalic and atraumatic  Right Ear: External ear normal       Left Ear: External ear normal       Nose: Nose normal    Eyes:      Extraocular Movements: Extraocular movements intact  Pulmonary:      Effort: Pulmonary effort is normal    Musculoskeletal:      Cervical back: Normal range of motion  Neurological:      Mental Status: He is alert  Mental status is at baseline     Psychiatric:         Mood and Affect: Mood normal             Additional Data:     Labs:    Results from last 7 days   Lab Units 04/28/23  0451 04/27/23  1220   WBC Thousand/uL 4 66 6 12   HEMOGLOBIN g/dL 7 4* 7 9*   HEMATOCRIT % 22 8* 24 7*   PLATELETS Thousands/uL 58* 65*   NEUTROS PCT %  --  69   LYMPHS PCT %  --  17   MONOS PCT %  --  7   EOS PCT %  --  5     Results from last 7 days   Lab Units 04/28/23  0451 04/27/23  1220   SODIUM mmol/L 139 138   POTASSIUM mmol/L 3 5 4 4   CHLORIDE mmol/L 112* 115*   CO2 mmol/L 24 18*   BUN mg/dL 54* 64*   CREATININE mg/dL 3 62* 4 06*   ANION GAP mmol/L 3* 5   CALCIUM mg/dL 7 6* 8 1*   ALBUMIN g/dL  --  2 9*   TOTAL BILIRUBIN mg/dL  --  0 17*   ALK PHOS U/L  --  76   ALT U/L  --  52   AST U/L  --  57*   GLUCOSE RANDOM mg/dL 100 144*         Results from last 7 days   Lab Units 04/28/23  1103 04/28/23  0759 04/28/23  0710 04/27/23  2047 04/27/23  1714   POC GLUCOSE mg/dl 86 142* 48* 204* 146*                    Recent Cultures (last 7 days):     Results from last 7 days   Lab Units 04/27/23  1226   URINE CULTURE  Culture too young- will reincubate       Last 24 Hours Medication List:   Current Facility-Administered Medications   Medication Dose Route Frequency Provider Last Rate    acetaminophen  650 mg Oral Q6H PRN Mel Hardy MD      albuterol  2 puff Inhalation Q6H PRN Mel Hardy MD      electrolyte-148  75 mL/hr Intravenous Continuous YAMILKA Paul      insulin detemir  15 Units Subcutaneous HS Nisreen Quevedo MD      insulin lispro  1-5 Units Subcutaneous TID AC Nisreen Quevedo MD      insulin lispro  1-5 Units Subcutaneous HS Nisreen Quevedo MD      multivitamin stress formula  1 tablet Oral Daily Nisreen Quevedo MD      oxybutynin  5 mg Oral BID PRN Nisreen Quevedo MD      pregabalin  75 mg Oral BID Nisreen Quevedo MD      tacrolimus  1 mg Oral Q12H Nisreen Quevedo MD      tamsulosin  0 4 mg Oral Daily With Herson Singh MD      temazepam  15 mg Oral HS Nisreen Quevedo MD      zolpidem  5 mg Oral HS Nisreen Quevedo MD       Facility-Administered Medications Ordered in Other Encounters   Medication Dose Route Frequency Provider Last Rate    [MAR Hold] alteplase  2 mg [facility] Home PRN Evens Rios MD          Today, Patient Was Seen By: Krishna Garland MD    ** Please Note: Dictation voice to text software may have been used in the creation of this document   **

## 2023-04-28 NOTE — UTILIZATION REVIEW
Initial Clinical Review    Admission: Date/Time/Statement:   Admission Orders (From admission, onward)     Ordered        04/27/23 1617  INPATIENT ADMISSION  Once                      Orders Placed This Encounter   Procedures    INPATIENT ADMISSION     Standing Status:   Standing     Number of Occurrences:   1     Order Specific Question:   Level of Care     Answer:   Med Surg [16]     Order Specific Question:   Estimated length of stay     Answer:   More than 2 Midnights     Order Specific Question:   Certification     Answer:   I certify that inpatient services are medically necessary for this patient for a duration of greater than two midnights  See H&P and MD Progress Notes for additional information about the patient's course of treatment  ED Arrival Information     Expected   -    Arrival   4/27/2023 10:07    Acuity   Urgent            Means of arrival   Walk-In    Escorted by   Family Member    Service   Hospitalist    Admission type   Emergency            Arrival complaint   kidney problem           Chief Complaint   Patient presents with    Evaluation of Abnormal Diagnostic Test     Pt had blood work yesterday and was told to come to ED  Family reports she thinks his kidney levels are abnormal  Patient denies any complaints  Initial Presentation: 74 y o  male who [facility] ED via EMS admitted Inpatient status dt Acute renal failure  Extensive PMH including advanced bladder CA, CKD, DM, Hep C, CVA  Presented due to abnormal labs results in outpt setting  BUN 64, Cr 4 18 baseline of 2 4  Pt did have a cystoscopy, stent placement and urethral dilation on 4/12  Hgb 8 3, Hct 26 2, plts 76, Agap 3, Ast 57, Alb 2 9  UA revealed glucose, blood, protein, leuks, WBCs, RBCs  CT AP positive for persistent hydronephrosis despite stent placement on the right  In ED, started on sodium bicarb    Plan:  med surg, NPO, bladder scan, check feNa and urine eosinophils, continue tacrolimus level and continue 1 mg bid, give IVF, urology and nephrology consults  Avoid NSAIDS, nephrotoxins and avoid hypotension  Continue home basal insulin and start accuchecks qid with SSI     4/27 Per Urology:  Pt underwent cystoscopy with urethral dilation and right ureteral stent exchange on 4/12/2023  Negative leukocytosis UA with trace leukocytes negative nitrates and large blood  Microscopic testing shows innumerable RBCs, 10-20 WBCs, and occasional bacteria  Urine culture pending  As patient is asymptomatic with stable vital signs and a febrile there is no indication for emergent urologic intervention at this time  Right ureteral stent appears in good position on CT imaging  Date: 4/28   Day 2:   Pt eating and drinking, no hematuria or abdominal pain noted  Urology following, nephrology recommends changing Na bicarb drip to plasmalyte today  Continue to monitor labs including Cr  Order iron studies, transfuse prn, possible candidate for EPO, nephrology following  Continue above listed tx plan  4/28 Per Nephrology:  Current creatinine 3 62-slowly improving with hydration-suggestive of prerenal  change to Plasma-Lyte at 75 cc/hr  Avoid nephrotoxins, NSAIDs, IV contrast if possible  Avoid hypotension or perturbations of blood pressure during decreased renal perfusion  Trend BMP   Monitor renal function, trend hgb, monitor VS     ED Triage Vitals [04/27/23 1017]   Temperature Pulse Respirations Blood Pressure SpO2   97 7 °F (36 5 °C) 72 18 110/65 100 %      Temp Source Heart Rate Source Patient Position - Orthostatic VS BP Location FiO2 (%)   Oral Monitor Sitting Left arm --      Pain Score       No Pain          Wt Readings from Last 1 Encounters:   04/27/23 57 2 kg (126 lb)     Additional Vital Signs:   Date/Time Temp Pulse Resp BP MAP (mmHg) SpO2 O2 Device Patient Position - Orthostatic VS   04/28/23 0800 -- 55 -- -- -- 98 % None (Room air) --   04/28/23 06:53:59 98 °F (36 7 °C) 56 16 117/63 81 98 % -- -- 04/28/23 06:45:10 97 8 °F (36 6 °C) 56 16 114/63 80 99 % -- --   04/28/23 06:44:42 97 8 °F (36 6 °C) 58 -- 114/63 80 99 % -- --   04/27/23 22:01:50 98 2 °F (36 8 °C) -- -- 131/68 89 -- -- --   04/27/23 22:01:25 98 2 °F (36 8 °C) -- -- 131/68 89 -- -- --   04/27/23 1930 -- -- -- -- -- -- None (Room air) --   04/27/23 19:29:58 98 3 °F (36 8 °C) 60 18 129/68 88 99 % -- --   04/27/23 1603 -- 67 18 143/78 -- 99 % None (Room air) Lying   04/27/23 1258 -- 61 16 134/70 -- 100 % None (Room air) --   04/27/23 1227 -- -- -- -- -- -- None (Room air) --   04/27/23 1017 97 7 °F (36 5 °C) 72 18 110/65 -- 100 % None (Room air) Sitting     Pertinent Labs/Diagnostic Test Results:   CT abdomen pelvis wo contrast   Final Result by Akhil Lowe MD (04/27 1611)      1  Overall stable exam  Right-sided ureteral stent remains in position  Chronic appearance of mild to moderate right-sided hydroureteronephrosis down to the bladder  Nonspecific bilateral perinephric and right periureteral fat stranding similar to the    prior study           Workstation performed: [id number]               Results from last 7 days   Lab Units 04/28/23  0451 04/27/23  1220 04/26/23  1321   WBC Thousand/uL 4 66 6 12 6 25   HEMOGLOBIN g/dL 7 4* 7 9* 8 3*   HEMATOCRIT % 22 8* 24 7* 26 2*   PLATELETS Thousands/uL 58* 65* 76*   NEUTROS ABS Thousands/µL  --  4 25 4 32         Results from last 7 days   Lab Units 04/28/23  0451 04/27/23  1220 04/26/23  1321   SODIUM mmol/L 139 138 138   POTASSIUM mmol/L 3 5 4 4 4 5   CHLORIDE mmol/L 112* 115* 117*   CO2 mmol/L 24 18* 18*   ANION GAP mmol/L 3* 5 3*   BUN mg/dL 54* 64* 64*   CREATININE mg/dL 3 62* 4 06* 4 18*   EGFR ml/min/1 73sq m 15 13 13   CALCIUM mg/dL 7 6* 8 1* 8 2*     Results from last 7 days   Lab Units 04/27/23  1220   AST U/L 57*   ALT U/L 52   ALK PHOS U/L 76   TOTAL PROTEIN g/dL 7 1   ALBUMIN g/dL 2 9*   TOTAL BILIRUBIN mg/dL 0 17*     Results from last 7 days   Lab Units 04/28/23  0759 "04/28/23  0710 04/27/23  2047 04/27/23  1714   POC GLUCOSE mg/dl 142* 48* 204* 146*     Results from last 7 days   Lab Units 04/28/23  0451 04/27/23  1220 04/26/23  1321   GLUCOSE RANDOM mg/dL 100 144* 209*     Results from last 7 days   Lab Units 04/27/23  1226   CLARITY UA  Turbid   COLOR UA  Light Yellow   SPEC GRAV UA  1 013   PH UA  5 5   GLUCOSE UA mg/dl 300 (3/10%)*   KETONES UA mg/dl Negative   BLOOD UA  Large*   PROTEIN UA mg/dl 70 (1+)*   NITRITE UA  Negative   BILIRUBIN UA  Negative   UROBILINOGEN UA (BE) mg/dl <2 0   LEUKOCYTES UA  Small*   WBC UA /hpf 10-20*   RBC UA /hpf Innumerable*   BACTERIA UA /hpf Occasional   EPITHELIAL CELLS WET PREP /hpf Occasional   MUCUS THREADS  Occasional*   SODIUM UR  81   CREATININE UR mg/dL 83 9     ED Treatment:   Medication Administration from 04/27/2023 1007 to 04/27/2023 1921       Date/Time Order Dose Route Action     04/27/2023 1808 EDT sodium bicarbonate 150 mEq in dextrose 5 % 1,000 mL infusion 75 mL/hr Intravenous New Bag     04/27/2023 1807 EDT pregabalin (LYRICA) capsule 75 mg 75 mg Oral Given     04/27/2023 1807 EDT tamsulosin (FLOMAX) capsule 0 4 mg 0 4 mg Oral Given        Past Medical History:   Diagnosis Date    Acute urinary retention 09/12/2022    Alcoholism ([address] )     \"Sober over 20 years\"    Anemia     Arthritis     Back pain     Bladder cancer ([address] )     Bruises easily     Cerebral artery aneurysm     Change in mental state     last assessed 5/18/15; resolved 10/27/15    Chronic kidney disease     COVID-19     2022 not hospitalized fully recovered    Diabetes mellitus ([address] )     Difficult intravenous access     Drug dependence ([address] )     \"in the past--40yrs ago per sig other\"    Fatigue     last assessed 1/26/15; resolved 5/24/16    Full dentures     Hepatitis C     \"had treatment\"    History of cancer chemotherapy     History of radiation therapy     History of transfusion     per sig other \"had blood transfusion when anemic from " "urinary bleeding\"   [facility] discharge follow-up 12/21/2018    [facility]x of [facility]     Hydronephrosis of right kidney     Insomnia     \"after liver transplant\"    Kidney disease     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[facility] )     Liver cirrhosis ([facility] )     [facility] ([facility]     [facility] [place]\"    Lung cancer [facility])      Hematology/oncology Dr Alicia Epstein in place     Renal disorder     Shortness of breath     \"when moving around-started on inhalers\"    Stroke (cerebrum) ([facility] )     Stroke ([facility] )     diff short term memory    Subdural hygroma     2/27/14; resolved 7/28/15    Thrombocytopenia ([place] [id number] ) 09/20/2017    Uses Cuban as primary spoken language      Present on Admission:   Peripheral neuropathy   History of hydronephrosis      Admitting Diagnosis: Inguinal hernia [K40 90]  Hydroureteronephrosis [N13 30]  Anemia [D64 9]  Pulmonary nodule [R91 1]  Kidney problem [N28 9]  Acute kidney injury ([facility] ) [N17 9]  Other hydronephrosis [N13 39]  Asymptomatic microscopic hematuria [R31 21]  Budding yeast detected [B37 9]  Ureteral stent present [Z96 0]  Age/Sex: 74 y o  male  Admission Orders:  Scheduled Medications:  insulin detemir, 15 Units, Subcutaneous, HS  insulin lispro, 1-5 Units, Subcutaneous, TID AC  insulin lispro, 1-5 Units, Subcutaneous, HS  multivitamin stress formula, 1 tablet, Oral, Daily  pregabalin, 75 mg, Oral, BID  tacrolimus, 1 mg, Oral, Q12H  tamsulosin, 0 4 mg, Oral, Daily With Dinner  temazepam, 15 mg, Oral, HS  zolpidem, 5 mg, Oral, HS      Continuous IV Infusions:  sodium bicarbonate infusion, 75 mL/hr, Intravenous, Continuous      PRN Meds:  acetaminophen, 650 mg, Oral, Q6H PRN  albuterol, 2 puff, Inhalation, Q6H PRN  oxybutynin, 5 mg, Oral, BID PRN    scd  Renal diet      IP CONSULT TO INTERNAL MEDICINE  IP CONSULT TO UROLOGY  IP CONSULT TO NEPHROLOGY    Network Utilization Review Department  ATTENTION: Please call with any questions or concerns to " [phone] and carefully listen to the prompts so that you are directed to the right person  All voicemails are confidential   Glennette Blinks all requests for admission clinical reviews, approved or denied determinations and any other requests to dedicated fax number below belonging to the campus where the patient is receiving treatment   List of dedicated fax numbers for the Facilities:  [address] DENIALS (Administrative/Medical Necessity) [phone]   [address] (Maternity/NICU/Pediatrics) [phone]   [address] [place] [address]  [phone]   [address] Marzen[facility] [address] [facility] [phone]

## 2023-04-28 NOTE — CONSULTS
[facility] CONSULTATION NOTE     CHIEF COMPLAINT   Kim Alvarado is a 74 y o  male with a complaint of   Chief Complaint   Patient presents with    Evaluation of Abnormal Diagnostic Test     Pt had blood work yesterday and was told to come to ED  Family reports she thinks his kidney levels are abnormal  Patient denies any complaints  History of Present Illness:   Kim Alvarado is a 74 y o  male here for evaluation of bladder cancer  I personally met the patient in May of 2022  Patient has a history of alcoholic cirrhosis and prior liver transplantation 20 years ago  Patient reports being seen by a prior urologist in 2019 for some bladder issues  CT scan in May of 2021 (prior to our meeting) did raise some question of bladder wall thickening  He did not see a urologist after this concern  Patient presented to the hospital in May with gross hematuria  CT showed question of progressive bladder lesions around the right side and distal ureter  Operative resection of the bladder was performed and demonstrated muscle invasive bladder cancer  The right ureter could not be visualized or stented and so the patient required nephrostomy tube placement  Given the patient's significant comorbidities, discussion at multidisciplinary tumor board was held  Patient was offered chemotherapy and radiation for MIBC  Unfortunately was unable to complete the course of chemotherapy given toxicity however radiation was completed  The patient has required nephrostomy tube changes however has had some dislodgement  In October of this year, his PCN was exchanged to a PCNU and the tube was capped  After cap trial, patient has not developed significant bleeding but has had flank pain and cloudy appearing urine  Based on our office cystoscopy in November, the patient I agree to return to the operating room    On January 18, 2023, the patient underwent retrograde pyelogram and placement of indwelling "double-J stent with removal of his right nephrostomy tube, biopsy of the periureteral tissue  Because of the significant dilation required in order to access the patient's bladder, I left him with a urethral catheter in place  Patient had significant difficulty with this and was seen in the emergency room  We returned to the operating room on April 12, 2023  Stent was exchanged for metal impregnated stent which can be left in place up to 1 year  Patient had outpatient laboratory values that demonstrates progressive renal dysfunction from his known baseline  He was asymptomatic of this    Presents to the hospital     Past Medical History:     Past Medical History:   Diagnosis Date    Acute urinary retention 09/12/2022    Alcoholism ([facility] [address] )     \"Sober over 21 years\"    Anemia     Arthritis     Back pain     Bladder cancer ([facility] [address] )     Bruises easily     Cerebral artery aneurysm     Change in mental state     last assessed 5/18/15; resolved 10/27/15    Chronic kidney disease     COVID-19 2022 not hospitalized fully recovered    Diabetes mellitus ([facility] [address] )     Difficult intravenous access     Drug dependence ([facility] [address] )     \"in the past--40yrs ago per sig other\"    Fatigue     last assessed 1/26/15; resolved 5/24/16    Full dentures     Hepatitis C     \"had treatment\"    History of cancer chemotherapy     History of radiation therapy     History of transfusion     per sig other \"had blood transfusion when anemic from urinary bleeding\"   [facility] discharge follow-up 12/21/2018    Hx of liver transplant ([address] )     Hydronephrosis of right kidney     Insomnia     \"after liver transplant\"    Kidney disease     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[facility] [address] [facility]     Liver cirrhosis ([facility] [address] )     Liver transplant recipient ([facility] [address] )     Stephie Hanks [place]\"    Lung cancer [facility])      Hematology/oncology Dr Marika Darnell in place     Renal disorder     Shortness of breath     \"when moving around-started on " "inhalers\"    Stroke (cerebrum) ([facility] [address] [facility]     Stroke ([facility] [address] [facility]     diff short term memory    Subdural hygroma     2/27/14; resolved 7/28/15    Thrombocytopenia ([facility] [address] [facility] 09/20/2017    Uses Setswana as primary spoken language        PAST SURGICAL HISTORY:     Past Surgical History:   Procedure Laterality Date    BRAIN SURGERY  02/12/2014    left frontotemporal 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    CATARACT EXTRACTION      CHOLECYSTECTOMY      COLONOSCOPY      [place]    [place]    FL LUMBAR PUNCTURE DIAGNOSTIC  12/14/2018    FL RETROGRADE PYELOGRAM  01/18/2023    FL RETROGRADE PYELOGRAM  4/12/2023    HEPATITIS B SURFACE AB QN,LIVER TRANSPLANT (HISTORICAL)      IR BIOPSY LUNG  02/20/2023    IR CHEST TUBE PLACEMENT  02/25/2023    IR NEPHROSTOMY TUBE CHECK/CHANGE/REPOSITION/REINSERTION/UPSIZE  07/29/2022    IR NEPHROSTOMY TUBE CHECK/CHANGE/REPOSITION/REINSERTION/UPSIZE  08/31/2022    IR NEPHROSTOMY TUBE CHECK/CHANGE/REPOSITION/REINSERTION/UPSIZE  10/07/2022    IR NEPHROSTOMY TUBE CHECK/CHANGE/REPOSITION/REINSERTION/UPSIZE  12/03/2022    IR NEPHROSTOMY TUBE PLACEMENT  05/28/2022    IR PICC LINE  12/14/2018    IR PORT PLACEMENT  06/23/2022    LIVER TRANSPLANTATION      LUNG BIOPSY      NEPHROSTOMY TUBE CHANGE N/A 01/18/2023    Procedure: Removal of  percutaneous nephroureteral catheter;  Surgeon: Tyler Decker MD;  [facility];  Service: Urology    NJ CYSTO BLADDER W/URETERAL CATHETERIZATION Right 01/18/2023    Procedure: URETHRAL DILATION, CYSTOGRAM, CYSTOSCOPY, RIGHT RETEROGRADE PYELOGRAM, [id number], COMPLEX CHICAS PLACEMENT;  Surgeon: Tyler Decker MD;  [facility];  Service: Urology    NJ CYSTO BLADDER W/URETERAL CATHETERIZATION Right 4/12/2023    Procedure: CYSTO  W/ STENT, urethral dilation with scope;  Surgeon: Tyler Decker MD;  [facility];  Service: Urology    NJ CYSTO CALIBRATION " DILAT URTL STRIX/STENOSIS N/A 4/12/2023    Procedure: DILATATION URETHRAL;  Surgeon: Gisela Loya MD;  [facility];  Service: Urology    ROTATOR CUFF REPAIR      SHOULDER SURGERY      TRANSURETHRAL RESECTION OF BLADDER TUMOR N/A 05/26/2022    Procedure: TRANSURETHRAL RESECTION OF BLADDER TUMOR (TURBT);   Surgeon: Gisela Loya MD;  [facility];  Service: Urology       CURRENT MEDICATIONS:     Current Facility-Administered Medications   Medication Dose Route Frequency Provider Last Rate Last Admin    acetaminophen (TYLENOL) tablet 650 mg  650 mg Oral Q6H PRN Perico Dee MD        albuterol (PROVENTIL HFA,VENTOLIN HFA) inhaler 2 puff  2 puff Inhalation Q6H PRN Perico Dee MD        electrolyte-148 (PLASMALYTE) solution  75 mL/hr Intravenous Continuous YAMILKA Shields        insulin detemir (LEVEMIR) subcutaneous injection 15 Units  15 Units Subcutaneous HS Perico Dee MD   15 Units at 04/27/23 2236    insulin lispro (HumaLOG) 100 units/mL subcutaneous injection 1-5 Units  1-5 Units Subcutaneous TID AC Perico Dee MD        insulin lispro (HumaLOG) 100 units/mL subcutaneous injection 1-5 Units  1-5 Units Subcutaneous HS Perico Dee MD   1 Units at 04/27/23 2236    multivitamin stress formula tablet 1 tablet  1 tablet Oral Daily Perico Dee MD   1 tablet at 04/28/23 0804    oxybutynin (DITROPAN) tablet 5 mg  5 mg Oral BID PRN Perico Dee MD        pregabalin (LYRICA) capsule 75 mg  75 mg Oral BID Perico Dee MD   75 mg at 04/28/23 0804    tacrolimus (PROGRAF) capsule 1 mg  1 mg Oral Q12H Perico Dee MD   1 mg at 04/28/23 0805    tamsulosin (FLOMAX) capsule 0 4 mg  0 4 mg Oral Daily With Cherri Eller MD   0 4 mg at 04/27/23 1807    temazepam (RESTORIL) capsule 15 mg  15 mg Oral HS Perico Dee MD   15 mg at 04/27/23 2235    zolpidem (AMBIEN) tablet 5 mg  5 mg Oral HS Perico Dee MD   5 mg at 04/27/23 2235 Facility-Administered Medications Ordered in Other Encounters   Medication Dose Route Frequency Provider Last Rate Last Admin    [MAR Hold] alteplase (CATHFLO) injection 2 mg  2 mg Intracatheter Q1MIN PRN Chata Layton MD           ALLERGIES:     Allergies   Allergen Reactions    Tequin [Gatifloxacin] Rash    Lipitor [Atorvastatin] Hives and Other (See Comments)     Rash and itching    Ciprofloxacin Rash    Iohexol Hives and Other (See Comments)     Contraindication with rosuvastatin   Iv Contrast [Iodinated Contrast Media] Rash and Other (See Comments)     Unknown    Levofloxacin Rash       SOCIAL HISTORY:     Social History     Socioeconomic History    Marital status: Single     Spouse name: None    Number of children: None    Years of education: less then high school    Highest education level: None   Occupational History    Occupation: physical disability    Tobacco Use    Smoking status: Former     Packs/day: 1 00     Years: 35 00     Pack years: 35 00     Types: Cigarettes     Start date:      Quit date:      Years since quittin 3     Passive exposure: Past    Smokeless tobacco: Never    Tobacco comments:     former smoker per allscripts    Vaping Use    Vaping Use: Never used   Substance and Sexual Activity    Alcohol use: Not Currently    Drug use: No     Comment: remotely quit drug use per allscripts     Sexual activity: None     Comment: defer   Other Topics Concern    None   Social History Narrative    ** Merged History Encounter **         ** Merged History Encounter **         Caffeine use   Living with significant other , girlfriend and daughter      Social Determinants of Health     Financial Resource Strain: Not on file   Food Insecurity: No Food Insecurity    Worried About Running Out of Food in the Last Year: Never true    Bee of Food in the Last Year: Never true   Transportation Needs: No Transportation Needs    Lack of Transportation (Medical):  No "   Lack of Transportation (Non-Medical): No   Physical Activity: Not on file   Stress: Not on file   Social Connections: Not on file   Intimate Partner Violence: Not on file   Housing Stability: Low Risk     Unable to Pay for Housing in the Last Year: No    Number of Places Lived in the Last Year: 1    Unstable Housing in the Last Year: No       SOCIAL HISTORY:     Family History   Problem Relation Age of Onset    Hypertension Mother         benign essential     Lung cancer Father     Diabetes Sister     Cancer Brother     Stroke Other         cva - due to embolism of cerebra artery        REVIEW OF SYSTEMS:     Review of Systems   Constitutional: Negative for activity change and unexpected weight change  Respiratory: Negative  Cardiovascular: Negative  Gastrointestinal: Negative for abdominal pain  Genitourinary: Negative  Negative for difficulty urinating, dysuria, flank pain and hematuria  Musculoskeletal: Negative for back pain  Skin: Negative  Psychiatric/Behavioral: Negative  PHYSICAL EXAM:     /63   Pulse 55   Temp 98 °F (36 7 °C)   Resp 16   Ht 5' 2\" (1 575 m)   Wt 57 2 kg (126 lb)   SpO2 98%   BMI 23 05 kg/m²     Physical Exam  Constitutional:       Appearance: He is not ill-appearing (Improved appearance)  Comments: Thin and frail-appearing elderly male   HENT:      Head: Normocephalic  Nose: Nose normal       Mouth/Throat:      Mouth: Mucous membranes are moist    Eyes:      Pupils: Pupils are equal, round, and reactive to light  Cardiovascular:      Rate and Rhythm: Normal rate  Pulses: Normal pulses  Pulmonary:      Effort: Pulmonary effort is normal    Abdominal:      General: Abdomen is flat  Genitourinary:     Comments: Uncircumcised phallus, reducible foreskin, orthotopic meatus, testicles smooth and descended, no CVA TTP  Musculoskeletal:         General: Normal range of motion  Cervical back: Normal range of motion   " Skin:     General: Skin is warm  Neurological:      Mental Status: He is alert  Psychiatric:         Mood and Affect: Mood normal          LABS:     CBC:   Lab Results   Component Value Date    WBC 4 66 2023    HGB 7 4 (L) 2023    HCT 22 8 (L) 2023    MCV 76 (L) 2023    PLT 58 (L) 2023       BMP:   Lab Results   Component Value Date    GLUCOSE 179 (H) 2022    CALCIUM 7 6 (L) 2023     2015    K 3 5 2023    CO2 24 2023     (H) 2023    BUN 54 (H) 2023    CREATININE 3 62 (H) 2023         IMAGIN/27/23  CT ABDOMEN AND PELVIS WITHOUT IV CONTRAST     INDICATION:   Kidney failure, acute  CARLOTTA  Malignant neoplasm of the bladder      COMPARISON: CT chest abdomen pelvis 2023      TECHNIQUE:  CT examination of the abdomen and pelvis was performed without intravenous contrast  Multiplanar 2D reformatted images were created from the source data      Radiation dose length product (DLP) for this visit:  262 72 mGy-cm   This examination, like all CT scans performed in the [facility], was performed utilizing techniques to minimize radiation dose exposure, including the use of iterative   reconstruction and automated exposure control      Enteric contrast was not administered      FINDINGS:  Exam is limited without IV and oral contrast particularly for soft tissue and bowel evaluation  ABDOMEN     LOWER CHEST: There is a tiny 3 mm right lower lobe nodule posteriorly, image 37 series 2, stable  Scattered atelectatic changes at the bilateral lung bases      LIVER/BILIARY TREE: Prior liver transplant  There is a small wedge-shaped hypodensity in the right lobe of the liver laterally, stable   No new suspicious findings      GALLBLADDER: Gallbladder is surgically absent      SPLEEN:  Unremarkable      PANCREAS:  Unremarkable      ADRENAL GLANDS:  Unremarkable      KIDNEYS/URETERS: Right-sided ureteral stent remains in position  Chronic appearance of mild to moderate right-sided hydroureteronephrosis down to the bladder  Cortical thinning of the right kidney, stable  Nonspecific bilateral perinephric and right   periureteral fat stranding similar to the prior study  No hydronephrosis on the left      STOMACH AND BOWEL:  Unremarkable      APPENDIX: A normal appendix was visualized      ABDOMINOPELVIC CAVITY:  No ascites  No pneumoperitoneum  No lymphadenopathy      VESSELS: Atherosclerotic changes are present  No evidence of aneurysm      PELVIS     REPRODUCTIVE ORGANS: Dense prostatic calcifications      URINARY BLADDER: Underdistended limiting evaluation  Suggestion of mild diffuse wall thickening, probably chronic      ABDOMINAL WALL/INGUINAL REGIONS: Small fat-containing left inguinal hernia suggested  Skin thickening and subcutaneous infiltrative changes at the anterior abdominal wall chain near the umbilicus, stable      OSSEOUS STRUCTURES:  No acute fracture or destructive osseous lesion  Scattered degenerative spurring of the visualized spine with variable endplate degenerative changes      IMPRESSION:     1  Overall stable exam  Right-sided ureteral stent remains in position  Chronic appearance of mild to moderate right-sided hydroureteronephrosis down to the bladder  Nonspecific bilateral perinephric and right periureteral fat stranding similar to the   prior study  PATHOLOGY:     1/18/23  Final Diagnosis   A  Right trigone bladder lesion:     - Partially denuded urothelium and underlying fibromuscular stroma with chronic lymphoplasmacytic inflammation       and histologic changes compatible with prior biopsy      - No carcinoma identified [pankeratin (AE1/AE3) and CK20 immunostains]  5/26/22  Final Diagnosis   A  Urinary Bladder, Right posterior bladder tumor, Transurethral resection:  - Invasive high-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 (detrusor muscle)      B   Urinary Bladder, Right bladder tumor deep, Transurethral resection:  - Invasive high-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 (detrusor muscle)  Electronically        ASSESSMENT:     74 y o  male  with muscle invasive bladder cancer status post aborted chemotherapy and completed radiation, right ureteral obstruction from tumor now with JJ stent, evidence of distal urethral stricture    PLAN:     Patient presents with laboratory findings of acute on chronic kidney disease with worsening creatinine and GFR  Unfortunately, the patient has chronic hydronephrosis  Stent was recently exchanged  The patient does not demonstrate any clinical signs of flank pain or CVA tenderness  He is starting to have some improvement in his creatinine with fluid resuscitation  I will recommend checking a postvoid residual to ensure the patient is not emptying his bladder completely  He has a known history of urethral stricture  If the renal function does not trend downward or if the patient has an elevated postvoid residual, I will recommend either urethral Webb catheter or replacement of right nephrostomy tube in an effort to drain both the kidney and his bladder  Patient has difficulty with urethral Webb catheters and reports that he may prefer long-term nephrostomy tube placement with indwelling stent to essentially drain his bladder  Despite his advanced bladder cancer and history of liver disease, the patient and his family have not been interested in a discussion of end-of-life care or hospice  We will reassess creatinine trend tomorrow

## 2023-04-29 LAB
ANION GAP SERPL CALCULATED.3IONS-SCNC: 2 MMOL/L (ref 4–13)
BACTERIA UR CULT: ABNORMAL
BACTERIA UR CULT: ABNORMAL
BUN SERPL-MCNC: 47 MG/DL (ref 5–25)
CALCIUM SERPL-MCNC: 7.4 MG/DL (ref 8.3–10.1)
CHLORIDE SERPL-SCNC: 112 MMOL/L (ref 96–108)
CO2 SERPL-SCNC: 25 MMOL/L (ref 21–32)
CREAT SERPL-MCNC: 3.35 MG/DL (ref 0.6–1.3)
EOSINOPHIL NFR URNS MANUAL: 2 %
ERYTHROCYTE [DISTWIDTH] IN BLOOD BY AUTOMATED COUNT: 16.4 % (ref 11.6–15.1)
GFR SERPL CREATININE-BSD FRML MDRD: 16 ML/MIN/1.73SQ M
GLUCOSE SERPL-MCNC: 106 MG/DL (ref 65–140)
GLUCOSE SERPL-MCNC: 109 MG/DL (ref 65–140)
GLUCOSE SERPL-MCNC: 128 MG/DL (ref 65–140)
GLUCOSE SERPL-MCNC: 236 MG/DL (ref 65–140)
GLUCOSE SERPL-MCNC: 75 MG/DL (ref 65–140)
HCT VFR BLD AUTO: 22.9 % (ref 36.5–49.3)
HGB BLD-MCNC: 7.2 G/DL (ref 12–17)
MCH RBC QN AUTO: 23.8 PG (ref 26.8–34.3)
MCHC RBC AUTO-ENTMCNC: 31.4 G/DL (ref 31.4–37.4)
MCV RBC AUTO: 76 FL (ref 82–98)
PLATELET # BLD AUTO: 50 THOUSANDS/UL (ref 149–390)
POTASSIUM SERPL-SCNC: 3.7 MMOL/L (ref 3.5–5.3)
RBC # BLD AUTO: 3.02 MILLION/UL (ref 3.88–5.62)
SODIUM SERPL-SCNC: 139 MMOL/L (ref 135–147)
TACROLIMUS BLD-MCNC: 1.9 NG/ML (ref 3–15)
WBC # BLD AUTO: 4.51 THOUSAND/UL (ref 4.31–10.16)

## 2023-04-29 RX ADMIN — TACROLIMUS 1 MG: 1 CAPSULE ORAL at 10:28

## 2023-04-29 RX ADMIN — B-COMPLEX W/ C & FOLIC ACID TAB 1 TABLET: TAB at 08:16

## 2023-04-29 RX ADMIN — INSULIN DETEMIR 15 UNITS: 100 INJECTION, SOLUTION SUBCUTANEOUS at 21:15

## 2023-04-29 RX ADMIN — ZOLPIDEM TARTRATE 5 MG: 5 TABLET ORAL at 21:14

## 2023-04-29 RX ADMIN — PREGABALIN 75 MG: 75 CAPSULE ORAL at 16:17

## 2023-04-29 RX ADMIN — SODIUM CHLORIDE, SODIUM GLUCONATE, SODIUM ACETATE, POTASSIUM CHLORIDE, MAGNESIUM CHLORIDE, SODIUM PHOSPHATE, DIBASIC, AND POTASSIUM PHOSPHATE 75 ML/HR: .53; .5; .37; .037; .03; .012; .00082 INJECTION, SOLUTION INTRAVENOUS at 16:17

## 2023-04-29 RX ADMIN — TAMSULOSIN HYDROCHLORIDE 0.4 MG: 0.4 CAPSULE ORAL at 16:17

## 2023-04-29 RX ADMIN — TEMAZEPAM 15 MG: 15 CAPSULE ORAL at 21:14

## 2023-04-29 RX ADMIN — TACROLIMUS 1 MG: 1 CAPSULE ORAL at 21:14

## 2023-04-29 RX ADMIN — INSULIN LISPRO 2 UNITS: 100 INJECTION, SOLUTION INTRAVENOUS; SUBCUTANEOUS at 16:17

## 2023-04-29 RX ADMIN — PREGABALIN 75 MG: 75 CAPSULE ORAL at 08:16

## 2023-04-29 NOTE — ASSESSMENT & PLAN NOTE
· LFTs appear stable  · Continue tacrolimus at 1mg BID  · Check tacrolimus level as mentioned above, awaiting for results

## 2023-04-29 NOTE — QUICK NOTE
"I saw and examined the patient  He remains comfortable without flank pain  Reports that he is emptying his bladder well without concern  Postvoid residual yesterday was low, anywhere from 75 to 197 cc  Urine remains yellow and clear  Kidney function has continued to improve towards vilam  At this point time, I will not recommend urethral Webb catheter placement  Patient is adamant that he would not want replacement of urethral catheter  We will continue to monitor creatinine trend and consider repeat ultrasound  Patient does have chronic right-sided hydronephrosis despite stent  If we are unable to achieve his baseline creatinine, would consider replacement of nephrostomy tube prior to discharge however I do not feel that there is any urgent need for decompression at this time  Urology will continue to follow      /71   Pulse 57   Temp 97 9 °F (36 6 °C)   Resp 16   Ht 5' 2\" (1 575 m)   Wt 57 2 kg (126 lb)   SpO2 99%   BMI 23 05 kg/m²     Lab Results   Component Value Date    SODIUM 139 04/29/2023    K 3 7 04/29/2023     (H) 04/29/2023    CO2 25 04/29/2023    BUN 47 (H) 04/29/2023    CREATININE 3 35 (H) 04/29/2023    GLUC 106 04/29/2023    CALCIUM 7 4 (L) 04/29/2023       "

## 2023-04-29 NOTE — PLAN OF CARE
Problem: Potential for Falls  Goal: Patient will remain free of falls  Description: INTERVENTIONS:  - Educate patient/family on patient safety including physical limitations  - Instruct patient to call for assistance with activity   - Consult OT/PT to assist with strengthening/mobility   - Keep Call bell within reach  - Keep bed low and locked with side rails adjusted as appropriate  - Keep care items and personal belongings within reach  - Initiate and maintain comfort rounds  - Make Fall Risk Sign visible to staff  - Offer Toileting every  Hours, in advance of need  - Initiate/Maintain alarm  - Obtain necessary fall risk management equipment:   - Apply yellow socks and bracelet for high fall risk patients  - Consider moving patient to room near nurses station  Outcome: Progressing     Problem: PAIN - ADULT  Goal: Verbalizes/displays adequate comfort level or baseline comfort level  Description: Interventions:  - Encourage patient to monitor pain and request assistance  - Assess pain using appropriate pain scale  - Administer analgesics based on type and severity of pain and evaluate response  - Implement non-pharmacological measures as appropriate and evaluate response  - Consider cultural and social influences on pain and pain management  - Notify physician/advanced practitioner if interventions unsuccessful or patient reports new pain  Outcome: Progressing     Problem: INFECTION - ADULT  Goal: Absence or prevention of progression during hospitalization  Description: INTERVENTIONS:  - Assess and monitor for signs and symptoms of infection  - Monitor lab/diagnostic results  - Monitor all insertion sites, i e  indwelling lines, tubes, and drains  - Monitor endotracheal if appropriate and nasal secretions for changes in amount and color  - [place] appropriate cooling/warming therapies per order  - Administer medications as ordered  - Instruct and encourage patient and family to use good hand hygiene technique  - Identify and instruct in appropriate isolation precautions for identified infection/condition  Outcome: Progressing  Goal: Absence of fever/infection during neutropenic period  Description: INTERVENTIONS:  - Monitor WBC    Outcome: Progressing     Problem: SAFETY ADULT  Goal: Patient will remain free of falls  Description: INTERVENTIONS:  - Educate patient/family on patient safety including physical limitations  - Instruct patient to call for assistance with activity   - Consult OT/PT to assist with strengthening/mobility   - Keep Call bell within reach  - Keep bed low and locked with side rails adjusted as appropriate  - Keep care items and personal belongings within reach  - Initiate and maintain comfort rounds  - Make Fall Risk Sign visible to staff  - Offer Toileting every  Hours, in advance of need  - Initiate/Maintain alarm  - Obtain necessary fall risk management equipment:   - Apply yellow socks and bracelet for high fall risk patients  - Consider moving patient to room near nurses station  Outcome: Progressing  Goal: Maintain or return to baseline ADL function  Description: INTERVENTIONS:  -  Assess patient's ability to carry out ADLs; assess patient's baseline for ADL function and identify physical deficits which impact ability to perform ADLs (bathing, care of mouth/teeth, toileting, grooming, dressing, etc )  - Assess/evaluate cause of self-care deficits   - Assess range of motion  - Assess patient's mobility; develop plan if impaired  - Assess patient's need for assistive devices and provide as appropriate  - Encourage maximum independence but intervene and supervise when necessary  - Involve family in performance of ADLs  - Assess for home care needs following discharge   - Consider OT consult to assist with ADL evaluation and planning for discharge  - Provide patient education as appropriate  Outcome: Progressing  Goal: Maintains/Returns to pre admission functional level  Description: INTERVENTIONS:  - Perform BMAT or MOVE assessment daily    - Set and communicate daily mobility goal to care team and patient/family/caregiver  - Collaborate with rehabilitation services on mobility goals if consulted  - Perform Range of Motion  times a day  - Reposition patient every  hours  - Dangle patient  times a day  - Stand patient  times a day  - Ambulate patient  times a day  - Out of bed to chair  times a day   - Out of bed for meals times a day  - Out of bed for toileting  - Record patient progress and toleration of activity level   Outcome: Progressing     Problem: DISCHARGE PLANNING  Goal: Discharge to home or other facility with appropriate resources  Description: INTERVENTIONS:  - Identify barriers to discharge w/patient and caregiver  - Arrange for needed discharge resources and transportation as appropriate  - Identify discharge learning needs (meds, wound care, etc )  - Arrange for interpretive services to assist at discharge as needed  - Refer to Case Management Department for coordinating discharge planning if the patient needs post-hospital services based on physician/advanced practitioner order or complex needs related to functional status, cognitive ability, or social support system  Outcome: Progressing     Problem: Knowledge Deficit  Goal: Patient/family/caregiver demonstrates understanding of disease process, treatment plan, medications, and discharge instructions  Description: Complete learning assessment and assess knowledge base    Interventions:  - Provide teaching at level of understanding  - Provide teaching via preferred learning methods  Outcome: Progressing     Problem: Prexisting or High Potential for Compromised Skin Integrity  Goal: Skin integrity is maintained or improved  Description: INTERVENTIONS:  - Identify patients at risk for skin breakdown  - Assess and monitor skin integrity  - Assess and monitor nutrition and hydration status  - Monitor labs   - Assess for incontinence   - Turn and reposition patient  - Assist with mobility/ambulation  - Relieve pressure over bony prominences  - Avoid friction and shearing  - Provide appropriate hygiene as needed including keeping skin clean and dry  - Evaluate need for skin moisturizer/barrier cream  - Collaborate with interdisciplinary team   - Patient/family teaching  - Consider wound care consult   Outcome: Progressing

## 2023-04-29 NOTE — PLAN OF CARE
Problem: Potential for Falls  Goal: Patient will remain free of falls  Description: INTERVENTIONS:  - Educate patient/family on patient safety including physical limitations  - Instruct patient to call for assistance with activity   - Consult OT/PT to assist with strengthening/mobility   - Keep Call bell within reach  - Keep bed low and locked with side rails adjusted as appropriate  - Keep care items and personal belongings within reach  - Initiate and maintain comfort rounds  - Make Fall Risk Sign visible to staff  -Ambulating in room/halls independently, yellow socks on for safety  - Apply yellow socks and bracelet for high fall risk patients  - Consider moving patient to room near nurses station  Outcome: Progressing     Problem: PAIN - ADULT  Goal: Verbalizes/displays adequate comfort level or baseline comfort level  Description: Interventions:  - Encourage patient to monitor pain and request assistance  - Assess pain using appropriate pain scale  - Administer analgesics based on type and severity of pain and evaluate response  - Implement non-pharmacological measures as appropriate and evaluate response  - Consider cultural and social influences on pain and pain management  - Notify physician/advanced practitioner if interventions unsuccessful or patient reports new pain  Outcome: Progressing     Problem: INFECTION - ADULT  Goal: Absence or prevention of progression during hospitalization  Description: INTERVENTIONS:  - Assess and monitor for signs and symptoms of infection  - Monitor lab/diagnostic results  - Monitor all insertion sites, i e  indwelling lines, tubes, and drains  - Monitor endotracheal if appropriate and nasal secretions for changes in amount and color  - [place] appropriate cooling/warming therapies per order  - Administer medications as ordered  - Instruct and encourage patient and family to use good hand hygiene technique  - Identify and instruct in appropriate isolation precautions for identified infection/condition  Outcome: Progressing  Goal: Absence of fever/infection during neutropenic period  Description: INTERVENTIONS:  - Monitor WBC    Outcome: Progressing     Problem: SAFETY ADULT  Goal: Patient will remain free of falls  Description: INTERVENTIONS:  - Educate patient/family on patient safety including physical limitations  - Instruct patient to call for assistance with activity   - Consult OT/PT to assist with strengthening/mobility   - Keep Call bell within reach  - Keep bed low and locked with side rails adjusted as appropriate  - Keep care items and personal belongings within reach  - Initiate and maintain comfort rounds  - Make Fall Risk Sign visible to staff  - Apply yellow socks and bracelet for high fall risk patients  - Consider moving patient to room near nurses station  Outcome: Progressing  Goal: Maintain or return to baseline ADL function  Description: INTERVENTIONS:  -  Assess patient's ability to carry out ADLs; assess patient's baseline for ADL function and identify physical deficits which impact ability to perform ADLs (bathing, care of mouth/teeth, toileting, grooming, dressing, etc )  - Assess/evaluate cause of self-care deficits   - Assess range of motion  - Assess patient's mobility; develop plan if impaired  - Assess patient's need for assistive devices and provide as appropriate  - Encourage maximum independence but intervene and supervise when necessary  - Involve family in performance of ADLs  - Assess for home care needs following discharge   - Consider OT consult to assist with ADL evaluation and planning for discharge  - Provide patient education as appropriate  Outcome: Progressing  Goal: Maintains/Returns to pre admission functional level  Description: INTERVENTIONS:  - Perform BMAT or MOVE assessment daily    - Set and communicate daily mobility goal to care team and patient/family/caregiver     - Collaborate with rehabilitation services on mobility goals if consulted  - Ambulating independently, gait steady  - Out of bed for toileting  - Record patient progress and toleration of activity level   Outcome: Progressing     Problem: DISCHARGE PLANNING  Goal: Discharge to home or other facility with appropriate resources  Description: INTERVENTIONS:  - Identify barriers to discharge w/patient and caregiver  - Arrange for needed discharge resources and transportation as appropriate  - Identify discharge learning needs (meds, wound care, etc )  - Arrange for interpretive services to assist at discharge as needed  - Refer to Case Management Department for coordinating discharge planning if the patient needs post-hospital services based on physician/advanced practitioner order or complex needs related to functional status, cognitive ability, or social support system  Outcome: Progressing     Problem: Knowledge Deficit  Goal: Patient/family/caregiver demonstrates understanding of disease process, treatment plan, medications, and discharge instructions  Description: Complete learning assessment and assess knowledge base    Interventions:  - Provide teaching at level of understanding  - Provide teaching via preferred learning methods  Outcome: Progressing     Problem: Prexisting or High Potential for Compromised Skin Integrity  Goal: Skin integrity is maintained or improved  Description: INTERVENTIONS:  - Identify patients at risk for skin breakdown  - Assess and monitor skin integrity  - Assess and monitor nutrition and hydration status  - Monitor labs   - Assess for incontinence   - Turn and reposition patient  - Assist with mobility/ambulation  - Relieve pressure over bony prominences  - Avoid friction and shearing  - Provide appropriate hygiene as needed including keeping skin clean and dry  - Evaluate need for skin moisturizer/barrier cream  - Collaborate with interdisciplinary team   - Patient/family teaching  - Consider wound care consult   Outcome: Progressing

## 2023-04-29 NOTE — UTILIZATION REVIEW
Continued Stay Review    Date:   4/29                        Current Patient Class: IP  Current Level of Care:  MS     HPI:73 y o  male initially admitted on 4/27  W/pre renal CARLOTTA in setting of advanced bladder CA and CKD4;  Treated w/bicarb drip  (changed to plasmalyte 4/28)    -Baseline creatinine: 2 4-3 0 mg/dl  -Admission creatinine: 4 06 mg/dl    4/29    No new complaints  No chest pain or shortness of breath  Renal function improving to creatinine 3 35 mg/dL today, electrolytes stable     Continue current IV fluid Plasma-Lyte (decrease rate to 75 mill per hour)  clinically euvolemic  Follow-up tacrolimus level, urine eosinophils today    Monitor as outpatient for persistent hematuria, does have ureteral stent which could be contributing    Vital Signs:   04/29/23 06:59:19 97 9 °F (36 6 °C) 57 16 121/71 88 99 % -- --   04/28/23 22:03:36 -- 63 16 127/76 93 97 % --      Pertinent Labs/Diagnostic Results:       Results from last 7 days   Lab Units 04/29/23  0552 04/28/23  0451 04/27/23  1220 04/26/23  1321   WBC Thousand/uL 4 51 4 66 6 12 6 25   HEMOGLOBIN g/dL 7 2* 7 4* 7 9* 8 3*   HEMATOCRIT % 22 9* 22 8* 24 7* 26 2*   PLATELETS Thousands/uL 50* 58* 65* 76*   NEUTROS ABS Thousands/µL  --   --  4 25 4 32         Results from last 7 days   Lab Units 04/29/23  0552 04/28/23  0451 04/27/23  1220 04/26/23  1321   SODIUM mmol/L 139 139 138 138   POTASSIUM mmol/L 3 7 3 5 4 4 4 5   CHLORIDE mmol/L 112* 112* 115* 117*   CO2 mmol/L 25 24 18* 18*   ANION GAP mmol/L 2* 3* 5 3*   BUN mg/dL 47* 54* 64* 64*   CREATININE mg/dL 3 35* 3 62* 4 06* 4 18*   EGFR ml/min/1 73sq m 16 15 13 13   CALCIUM mg/dL 7 4* 7 6* 8 1* 8 2*     Results from last 7 days   Lab Units 04/27/23  1220   AST U/L 57*   ALT U/L 52   ALK PHOS U/L 76   TOTAL PROTEIN g/dL 7 1   ALBUMIN g/dL 2 9*   TOTAL BILIRUBIN mg/dL 0 17*     Results from last 7 days   Lab Units 04/29/23  0700 04/28/23  2045 04/28/23  1615 04/28/23  1103 04/28/23  0759 04/28/23  0710 04/27/23 2047 04/27/23  1714   POC GLUCOSE mg/dl 75 147* 169* 86 142* 48* 204* 146*     Results from last 7 days   Lab Units 04/29/23  0552 04/28/23  0451 04/27/23  1220 04/26/23  1321   GLUCOSE RANDOM mg/dL 106 100 144* 209*     Results from last 7 days   Lab Units 04/27/23  1226   CLARITY UA  Turbid   COLOR UA  Light Yellow   SPEC GRAV UA  1 013   PH UA  5 5   GLUCOSE UA mg/dl 300 (3/10%)*   KETONES UA mg/dl Negative   BLOOD UA  Large*   PROTEIN UA mg/dl 70 (1+)*   NITRITE UA  Negative   BILIRUBIN UA  Negative   UROBILINOGEN UA (BE) mg/dl <2 0   LEUKOCYTES UA  Small*   WBC UA /hpf 10-20*   RBC UA /hpf Innumerable*   BACTERIA UA /hpf Occasional   EPITHELIAL CELLS WET PREP /hpf Occasional   MUCUS THREADS  Occasional*   SODIUM UR  81   CREATININE UR mg/dL 83 9     Results from last 7 days   Lab Units 04/27/23  1226   URINE CULTURE  Culture too young- will reincubate                 Medications:   Scheduled Medications:  insulin detemir, 15 Units, Subcutaneous, HS  insulin lispro, 1-5 Units, Subcutaneous, TID AC  insulin lispro, 1-5 Units, Subcutaneous, HS  multivitamin stress formula, 1 tablet, Oral, Daily  pregabalin, 75 mg, Oral, BID  tacrolimus, 1 mg, Oral, Q12H  tamsulosin, 0 4 mg, Oral, Daily With Dinner  temazepam, 15 mg, Oral, HS  zolpidem, 5 mg, Oral, HS      Continuous IV Infusions:  multi-electrolyte, 75 mL/hr, Intravenous, Continuous      PRN Meds:  acetaminophen, 650 mg, Oral, Q6H PRN  albuterol, 2 puff, Inhalation, Q6H PRN  oxybutynin, 5 mg, Oral, BID PRN        Discharge Plan: d    Network Utilization Review Department  ATTENTION: Please call with any questions or concerns to [phone] and carefully listen to the prompts so that you are directed to the right person   All voicemails are confidential   Megha De Luna all requests for admission clinical reviews, approved or denied determinations and any other requests to dedicated fax number below belonging to the campus where the patient is receiving treatment   List of dedicated fax numbers for the Facilities:  [address] DENIALS (Administrative/Medical Necessity) [phone]   [address]  (Maternity/NICU/Pediatrics) [phone] [address] [place] [address]  [phone]   Sanket Tobin 50 [address] [facility] [place] [address] [facility] [address] [facility] [phone]

## 2023-04-29 NOTE — PLAN OF CARE
Problem: Potential for Falls  Goal: Patient will remain free of falls  Description: INTERVENTIONS:  - Educate patient/family on patient safety including physical limitations  - Instruct patient to call for assistance with activity   - Consult OT/PT to assist with strengthening/mobility   - Keep Call bell within reach  - Keep bed low and locked with side rails adjusted as appropriate  - Keep care items and personal belongings within reach  - Initiate and maintain comfort rounds  - Make Fall Risk Sign visible to staff  - Offer Toileting every  Hours, in advance of need  - Initiate/Maintain alarm  - Obtain necessary fall risk management equipment:   - Apply yellow socks and bracelet for high fall risk patients  - Consider moving patient to room near nurses station  Outcome: Progressing     Problem: PAIN - ADULT  Goal: Verbalizes/displays adequate comfort level or baseline comfort level  Description: Interventions:  - Encourage patient to monitor pain and request assistance  - Assess pain using appropriate pain scale  - Administer analgesics based on type and severity of pain and evaluate response  - Implement non-pharmacological measures as appropriate and evaluate response  - Consider cultural and social influences on pain and pain management  - Notify physician/advanced practitioner if interventions unsuccessful or patient reports new pain  Outcome: Progressing     Problem: INFECTION - ADULT  Goal: Absence or prevention of progression during hospitalization  Description: INTERVENTIONS:  - Assess and monitor for signs and symptoms of infection  - Monitor lab/diagnostic results  - Monitor all insertion sites, i e  indwelling lines, tubes, and drains  - Monitor endotracheal if appropriate and nasal secretions for changes in amount and color  - [place] appropriate cooling/warming therapies per order  - Administer medications as ordered  - Instruct and encourage patient and family to use good hand hygiene technique  - Identify and instruct in appropriate isolation precautions for identified infection/condition  Outcome: Progressing  Goal: Absence of fever/infection during neutropenic period  Description: INTERVENTIONS:  - Monitor WBC    Outcome: Progressing     Problem: SAFETY ADULT  Goal: Patient will remain free of falls  Description: INTERVENTIONS:  - Educate patient/family on patient safety including physical limitations  - Instruct patient to call for assistance with activity   - Consult OT/PT to assist with strengthening/mobility   - Keep Call bell within reach  - Keep bed low and locked with side rails adjusted as appropriate  - Keep care items and personal belongings within reach  - Initiate and maintain comfort rounds  - Make Fall Risk Sign visible to staff  - Offer Toileting every  Hours, in advance of need  - Initiate/Maintain alarm  - Obtain necessary fall risk management equipment:   - Apply yellow socks and bracelet for high fall risk patients  - Consider moving patient to room near nurses station  Outcome: Progressing  Goal: Maintain or return to baseline ADL function  Description: INTERVENTIONS:  -  Assess patient's ability to carry out ADLs; assess patient's baseline for ADL function and identify physical deficits which impact ability to perform ADLs (bathing, care of mouth/teeth, toileting, grooming, dressing, etc )  - Assess/evaluate cause of self-care deficits   - Assess range of motion  - Assess patient's mobility; develop plan if impaired  - Assess patient's need for assistive devices and provide as appropriate  - Encourage maximum independence but intervene and supervise when necessary  - Involve family in performance of ADLs  - Assess for home care needs following discharge   - Consider OT consult to assist with ADL evaluation and planning for discharge  - Provide patient education as appropriate  Outcome: Progressing  Goal: Maintains/Returns to pre admission functional level  Description: INTERVENTIONS:  - Perform BMAT or MOVE assessment daily    - Set and communicate daily mobility goal to care team and patient/family/caregiver  - Collaborate with rehabilitation services on mobility goals if consulted  - Perform Range of Motion  times a day  - Reposition patient every  hours  - Dangle patient  times a day  - Stand patient  times a day  - Ambulate patient  times a day  - Out of bed to chair  times a day   - Out of bed for meals times a day  - Out of bed for toileting  - Record patient progress and toleration of activity level   Outcome: Progressing     Problem: DISCHARGE PLANNING  Goal: Discharge to home or other facility with appropriate resources  Description: INTERVENTIONS:  - Identify barriers to discharge w/patient and caregiver  - Arrange for needed discharge resources and transportation as appropriate  - Identify discharge learning needs (meds, wound care, etc )  - Arrange for interpretive services to assist at discharge as needed  - Refer to Case Management Department for coordinating discharge planning if the patient needs post-hospital services based on physician/advanced practitioner order or complex needs related to functional status, cognitive ability, or social support system  Outcome: Progressing     Problem: Knowledge Deficit  Goal: Patient/family/caregiver demonstrates understanding of disease process, treatment plan, medications, and discharge instructions  Description: Complete learning assessment and assess knowledge base    Interventions:  - Provide teaching at level of understanding  - Provide teaching via preferred learning methods  Outcome: Progressing     Problem: Prexisting or High Potential for Compromised Skin Integrity  Goal: Skin integrity is maintained or improved  Description: INTERVENTIONS:  - Identify patients at risk for skin breakdown  - Assess and monitor skin integrity  - Assess and monitor nutrition and hydration status  - Monitor labs   - Assess for incontinence   - Turn and reposition patient  - Assist with mobility/ambulation  - Relieve pressure over bony prominences  - Avoid friction and shearing  - Provide appropriate hygiene as needed including keeping skin clean and dry  - Evaluate need for skin moisturizer/barrier cream  - Collaborate with interdisciplinary team   - Patient/family teaching  - Consider wound care consult   Outcome: Progressing

## 2023-04-29 NOTE — ASSESSMENT & PLAN NOTE
Lab Results   Component Value Date    EGFR 16 04/29/2023    EGFR 15 04/28/2023    EGFR 13 04/27/2023    CREATININE 3 35 (H) 04/29/2023    CREATININE 3 62 (H) 04/28/2023    CREATININE 4 06 (H) 04/27/2023   · routine outpatient labs remarkable for CARLOTTA on CKD3  · Baseline creatinine is about 2 4  · Denies any complaints including diarrhea or vomiting or flank pain  · CT abdomen done in the ED is positive for persistent hydronephrosis despite stent placement on the right  · Check feNa and urine eosinophils  · Check tacrolimus level  · Urology following-recommending to trend creatinine and will repeat ultrasound of kidneys if needed, possible nephrostomy tube placement? · Avoid NSAIDS, nephrotoxins and avoid hypotension  · Nephro consult appreciated-continue to monitor creatinine and continue fluids, will order SPEP/UPEP  · Urology consult appreciated

## 2023-04-29 NOTE — PROGRESS NOTES
[facility]  Progress Note  Name: Cory Amado  MRN: [id number]  Unit/Bed#: PPHP 801-01 I Date of Admission: 4/27/2023   Date of Service: 4/29/2023 I Hospital Day: 2    Assessment/Plan   * Acute renal failure superimposed on stage 3 chronic kidney disease [facility])  Assessment & Plan  Lab Results   Component Value Date    EGFR 16 04/29/2023    EGFR 15 04/28/2023    EGFR 13 04/27/2023    CREATININE 3 35 (H) 04/29/2023    CREATININE 3 62 (H) 04/28/2023    CREATININE 4 06 (H) 04/27/2023   · routine outpatient labs remarkable for CARLOTTA on CKD3  · Baseline creatinine is about 2 4  · Denies any complaints including diarrhea or vomiting or flank pain  · CT abdomen done in the ED is positive for persistent hydronephrosis despite stent placement on the right  · Check feNa and urine eosinophils  · Check tacrolimus level  · Urology following-recommending to trend creatinine and will repeat ultrasound of kidneys if needed, possible nephrostomy tube placement? · Avoid NSAIDS, nephrotoxins and avoid hypotension  · Nephro consult appreciated-continue to monitor creatinine and continue fluids, will order SPEP/UPEP  · Urology consult appreciated       Bicytopenia  Assessment & Plan  · Anemia and thrombocytopenia  · Anemia is likely multifactorial  · Multiple blood draws  · Chronic illness/cancer  · Chronic kidney disease  · Iron deficiency  · Thrombocytopenia is also chronic  · Monitor CBC closely while on heparin  · Check iron studies  · Transfuse PRN  · May be a candidate for EPO, will defer to nephrology    History of hydronephrosis  Assessment & Plan  · Recent cystoscopy and stent with urethral dilation  · Plan as above    Type 2 diabetes mellitus with diabetic peripheral angiopathy without gangrene, with long-term current use of insulin [facility])  Assessment & Plan  Lab Results   Component Value Date    HGBA1C 7 4 (A) 12/06/2022       Recent Labs     04/28/23  1615 04/28/23  2045 04/29/23  0700 23  1114   POCGLU 169* 147* 75 109       Blood Sugar Average: Last 72 hrs:  · (P) 125 [id number] diet for now  · Continue home dose of basal insulin  · fingersticks QID with sliding scale coverage  · Add prandial insulin if indicated    Peripheral neuropathy  Assessment & Plan  · Continue lyrica    Liver transplant status ([facility] [address] )  Assessment & Plan  · LFTs appear stable  · Continue tacrolimus at 1mg BID  · Check tacrolimus level as mentioned above, awaiting for results           VTE Pharmacologic Prophylaxis:   High Risk (Score >/= 5) - Pharmacological DVT Prophylaxis Contraindicated  Sequential Compression Devices Ordered  Patient Centered Rounds: I performed bedside rounds with nursing staff today  Discussions with Specialists or Other Care Team Provider: Urology, Nephrology    Education and Discussions with Family / Patient: Attempted to update  (wife) via phone  Left voicemail  Total Time Spent on Date of Encounter in care of patient: 35 minutes This time was spent on one or more of the following: performing physical exam; counseling and coordination of care; obtaining or reviewing history; documenting in the medical record; reviewing/ordering tests, medications or procedures; communicating with other healthcare professionals and discussing with patient's family/caregivers  Current Length of Stay: 2 day(s)  Current Patient Status: Inpatient   Certification Statement: The patient will continue to require additional inpatient hospital stay due to Cleared by urology, nephrology, will continue IVF and trend creatinine  Discharge Plan: Anticipate discharge in 24-48 hrs to home  Code Status: Level 1 - Full Code    Subjective:   Patient reports feeling well  Denies chest pain/pressure, palpitations, lightheadedness, nausea, chills, or shortness of breath      Objective:     Vitals:   Temp (24hrs), Av 8 °F (36 6 °C), Min:97 7 °F (36 5 °C), Max:97 9 °F (36 6 °C)    Temp: [97 7 °F (36 5 °C)-97 9 °F (36 6 °C)] 97 9 °F (36 6 °C)  HR:  [57-63] 57  Resp:  [16-20] 16  BP: (121-127)/(71-76) 121/71  SpO2:  [97 %-99 %] 98 %  Body mass index is 23 05 kg/m²  Input and Output Summary (last 24 hours): Intake/Output Summary (Last 24 hours) at 4/29/2023 1446  Last data filed at 4/29/2023 1201  Gross per 24 hour   Intake 1113 75 ml   Output 1939 ml   Net -825 25 ml       Physical Exam:   Physical Exam  Vitals and nursing note reviewed  Constitutional:       Appearance: He is normal weight  HENT:      Head: Normocephalic  Nose: Nose normal       Mouth/Throat:      Mouth: Mucous membranes are moist       Pharynx: Oropharynx is clear  Eyes:      General: No scleral icterus  Conjunctiva/sclera: Conjunctivae normal       Pupils: Pupils are equal, round, and reactive to light  Cardiovascular:      Rate and Rhythm: Normal rate and regular rhythm  Heart sounds: No murmur heard  No friction rub  No gallop  Pulmonary:      Effort: Pulmonary effort is normal  No respiratory distress  Breath sounds: Normal breath sounds  No stridor  No wheezing, rhonchi or rales  Abdominal:      General: Abdomen is flat  Palpations: Abdomen is soft  Musculoskeletal:         General: Normal range of motion  Cervical back: Normal range of motion and neck supple  Right lower leg: No edema  Left lower leg: No edema  Lymphadenopathy:      Cervical: No cervical adenopathy  Skin:     General: Skin is warm  Coloration: Skin is not jaundiced or pale  Findings: No bruising, erythema or lesion  Neurological:      General: No focal deficit present  Mental Status: He is alert and oriented to person, place, and time  Mental status is at baseline  Cranial Nerves: No cranial nerve deficit  Motor: No weakness  Psychiatric:         Mood and Affect: Mood normal          Behavior: Behavior normal          Thought Content:  Thought content normal  Additional Data:     Labs:  Results from last 7 days   Lab Units 04/29/23  0552 04/28/23  0451 04/27/23  1220   WBC Thousand/uL 4 51   < > 6 12   HEMOGLOBIN g/dL 7 2*   < > 7 9*   HEMATOCRIT % 22 9*   < > 24 7*   PLATELETS Thousands/uL 50*   < > 65*   NEUTROS PCT %  --   --  69   LYMPHS PCT %  --   --  17   MONOS PCT %  --   --  7   EOS PCT %  --   --  5    < > = values in this interval not displayed  Results from last 7 days   Lab Units 04/29/23  0552 04/28/23  0451 04/27/23  1220   SODIUM mmol/L 139   < > 138   POTASSIUM mmol/L 3 7   < > 4 4   CHLORIDE mmol/L 112*   < > 115*   CO2 mmol/L 25   < > 18*   BUN mg/dL 47*   < > 64*   CREATININE mg/dL 3 35*   < > 4 06*   ANION GAP mmol/L 2*   < > 5   CALCIUM mg/dL 7 4*   < > 8 1*   ALBUMIN g/dL  --   --  2 9*   TOTAL BILIRUBIN mg/dL  --   --  0 17*   ALK PHOS U/L  --   --  76   ALT U/L  --   --  52   AST U/L  --   --  57*   GLUCOSE RANDOM mg/dL 106   < > 144*    < > = values in this interval not displayed           Results from last 7 days   Lab Units 04/29/23  1114 04/29/23  0700 04/28/23  2045 04/28/23  1615 04/28/23  1103 04/28/23  0759 04/28/23  0710 04/27/23  2047 04/27/23  1714   POC GLUCOSE mg/dl 109 75 147* 169* 86 142* 48* 204* 146*               Lines/Drains:  Invasive Devices     Central Venous Catheter Line  Duration           Port A Cath 06/23/22 Right Chest 310 days          Drain  Duration           Ureteral Internal Stent Right ureter 6 Fr  101 days    Chest Tube Right Pleural 8 Fr  62 days                Central Line:  Goal for removal: N/A - Chronic PICC             Imaging: Reviewed radiology reports from this admission including: chest xray and abdominal/pelvic CT    Recent Cultures (last 7 days):   Results from last 7 days   Lab Units 04/27/23  1226   URINE CULTURE  70,000-79,000 cfu/ml Alpha Hemolytic Streptococcus NOT Enterococcus*  <10,000 cfu/ml       Last 24 Hours Medication List:   Current Facility-Administered Medications   Medication Dose Route Frequency Provider Last Rate    acetaminophen  650 mg Oral Q6H PRN Trudi Szymanski MD      albuterol  2 puff Inhalation Q6H PRN Trudi Szymanski MD      insulin detemir  15 Units Subcutaneous HS Trudi Stephon, MD      insulin lispro  1-5 Units Subcutaneous TID AC Trudi Szymanski MD      insulin lispro  1-5 Units Subcutaneous HS Trudi StephonMD alex      multi-electrolyte  75 mL/hr Intravenous Continuous Charley Must, CRNP 75 mL/hr (04/28/23 1431)    multivitamin stress formula  1 tablet Oral Daily Trudi Szymanski MD      oxybutynin  5 mg Oral BID PRN Trudi Stephon, MD      pregabalin  75 mg Oral BID Trudi Stephon, MD      tacrolimus  1 mg Oral Q12H Trudi Stephon, MD      tamsulosin  0 4 mg Oral Daily With Shelvy Olszewski, MD      temazepam  15 mg Oral HS Trudi MD Stephon      zolpidem  5 mg Oral HS Trudi Stephon, MD          Today, Patient Was Seen By: Cherry Pizano PA-C    **Please Note: This note may have been constructed using a voice recognition system  **

## 2023-04-29 NOTE — ASSESSMENT & PLAN NOTE
Lab Results   Component Value Date    HGBA1C 7 4 (A) 12/06/2022       Recent Labs     04/28/23  1615 04/28/23  2045 04/29/23  0700 04/29/23  1114   POCGLU 169* 147* 75 109       Blood Sugar Average: Last 72 hrs:  · (P) 125 [id number] diet for now  · Continue home dose of basal insulin  · fingersticks QID with sliding scale coverage  · Add prandial insulin if indicated

## 2023-04-30 ENCOUNTER — TELEPHONE (OUTPATIENT)
Dept: UROLOGY | Facility: CLINIC | Age: 73
End: 2023-04-30

## 2023-04-30 VITALS
WEIGHT: 126 LBS | TEMPERATURE: 98.3 F | SYSTOLIC BLOOD PRESSURE: 115 MMHG | RESPIRATION RATE: 16 BRPM | DIASTOLIC BLOOD PRESSURE: 60 MMHG | HEIGHT: 62 IN | HEART RATE: 64 BPM | OXYGEN SATURATION: 100 % | BODY MASS INDEX: 23.19 KG/M2

## 2023-04-30 DIAGNOSIS — N35.913 STRICTURE OF MEMBRANOUS URETHRA IN MALE, UNSPECIFIED STRICTURE TYPE: Primary | ICD-10-CM

## 2023-04-30 LAB
ANION GAP SERPL CALCULATED.3IONS-SCNC: 2 MMOL/L (ref 4–13)
BUN SERPL-MCNC: 44 MG/DL (ref 5–25)
CALCIUM SERPL-MCNC: 7.4 MG/DL (ref 8.3–10.1)
CHLORIDE SERPL-SCNC: 112 MMOL/L (ref 96–108)
CO2 SERPL-SCNC: 25 MMOL/L (ref 21–32)
CREAT SERPL-MCNC: 3.28 MG/DL (ref 0.6–1.3)
ERYTHROCYTE [DISTWIDTH] IN BLOOD BY AUTOMATED COUNT: 16.7 % (ref 11.6–15.1)
GFR SERPL CREATININE-BSD FRML MDRD: 17 ML/MIN/1.73SQ M
GLUCOSE SERPL-MCNC: 114 MG/DL (ref 65–140)
GLUCOSE SERPL-MCNC: 136 MG/DL (ref 65–140)
GLUCOSE SERPL-MCNC: 68 MG/DL (ref 65–140)
GLUCOSE SERPL-MCNC: 91 MG/DL (ref 65–140)
HCT VFR BLD AUTO: 22.9 % (ref 36.5–49.3)
HGB BLD-MCNC: 7.2 G/DL (ref 12–17)
MCH RBC QN AUTO: 24.2 PG (ref 26.8–34.3)
MCHC RBC AUTO-ENTMCNC: 31.4 G/DL (ref 31.4–37.4)
MCV RBC AUTO: 77 FL (ref 82–98)
PLATELET # BLD AUTO: 53 THOUSANDS/UL (ref 149–390)
POTASSIUM SERPL-SCNC: 4 MMOL/L (ref 3.5–5.3)
RBC # BLD AUTO: 2.98 MILLION/UL (ref 3.88–5.62)
SODIUM SERPL-SCNC: 139 MMOL/L (ref 135–147)
WBC # BLD AUTO: 4.78 THOUSAND/UL (ref 4.31–10.16)

## 2023-04-30 RX ADMIN — PREGABALIN 75 MG: 75 CAPSULE ORAL at 08:30

## 2023-04-30 RX ADMIN — TACROLIMUS 1 MG: 1 CAPSULE ORAL at 08:31

## 2023-04-30 RX ADMIN — B-COMPLEX W/ C & FOLIC ACID TAB 1 TABLET: TAB at 08:31

## 2023-04-30 RX ADMIN — SODIUM CHLORIDE, SODIUM GLUCONATE, SODIUM ACETATE, POTASSIUM CHLORIDE, MAGNESIUM CHLORIDE, SODIUM PHOSPHATE, DIBASIC, AND POTASSIUM PHOSPHATE 75 ML/HR: .53; .5; .37; .037; .03; .012; .00082 INJECTION, SOLUTION INTRAVENOUS at 08:30

## 2023-04-30 NOTE — OCCUPATIONAL THERAPY NOTE
"    Occupational Therapy Evaluation     Patient Name: Tom Meneses  Today's Date: 4/30/2023  Problem List  Principal Problem:    Acute renal failure superimposed on stage 3 chronic kidney disease ([facility] [address] )  Active Problems:    Liver transplant status ([facility] [address] )    Peripheral neuropathy    Type 2 diabetes mellitus with diabetic peripheral angiopathy without gangrene, with long-term current use of insulin ([address] )    History of hydronephrosis    Bicytopenia    Past Medical History  Past Medical History:   Diagnosis Date    Acute urinary retention 09/12/2022    Alcoholism ([address] )     \"Sober over 20 years\"    Anemia     Arthritis     Back pain     Bladder cancer ([facility] [address] )     Bruises easily     Cerebral artery aneurysm     Change in mental state     last assessed 5/18/15; resolved 10/27/15    Chronic kidney disease     COVID-19 2022 not hospitalized fully recovered    Diabetes mellitus ([facility] [address] )     Difficult intravenous access     Drug dependence ([facility] [address] )     \"in the past--40yrs ago per sig other\"    Fatigue     last assessed 1/26/15; resolved 5/24/16    Full dentures     Hepatitis C     \"had treatment\"    History of cancer chemotherapy     History of radiation therapy     History of transfusion     per sig other \"had blood transfusion when anemic from urinary bleeding\"    Hospital discharge follow-up 12/21/2018    Hx of liver transplant ([facility] [address] )     Hydronephrosis of right kidney     Insomnia     \"after liver transplant\"    Kidney disease     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[facility] [address] )     Liver cirrhosis ([facility] [address] )     Liver transplant recipient ([facility] [address] )     Veronica Hensleyn\"    Lung cancer [facility])      Hematology/oncology Dr Fariha Santos in place     Renal disorder     Shortness of breath     \"when moving around-started on inhalers\"    Stroke (cerebrum) ([facility] [address] )     Stroke ([facility] [address] )     diff short term memory    Subdural hygroma     2/27/14; resolved 7/28/15    Thrombocytopenia ([facility] [address] ) 09/20/2017    Uses Tristanian as primary spoken language      Past " Surgical History  Past Surgical History:   Procedure Laterality Date    BRAIN SURGERY  02/12/2014    left frontotemporal 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    CATARACT EXTRACTION      CHOLECYSTECTOMY      COLONOSCOPY      CYSTOSCOPY  11/30/2022    [place]    FL LUMBAR PUNCTURE DIAGNOSTIC  12/14/2018    FL RETROGRADE PYELOGRAM  01/18/2023    FL RETROGRADE PYELOGRAM  4/12/2023    HEPATITIS B SURFACE AB QN,LIVER TRANSPLANT (HISTORICAL)      IR BIOPSY LUNG  02/20/2023    IR CHEST TUBE PLACEMENT  02/25/2023    IR NEPHROSTOMY TUBE CHECK/CHANGE/REPOSITION/REINSERTION/UPSIZE  07/29/2022    IR NEPHROSTOMY TUBE CHECK/CHANGE/REPOSITION/REINSERTION/UPSIZE  08/31/2022    IR NEPHROSTOMY TUBE CHECK/CHANGE/REPOSITION/REINSERTION/UPSIZE  10/07/2022    IR NEPHROSTOMY TUBE CHECK/CHANGE/REPOSITION/REINSERTION/UPSIZE  12/03/2022    IR NEPHROSTOMY TUBE PLACEMENT  05/28/2022    IR PICC LINE  12/14/2018    IR PORT PLACEMENT  06/23/2022    LIVER TRANSPLANTATION      LUNG BIOPSY      NEPHROSTOMY TUBE CHANGE N/A 01/18/2023    Procedure: Removal of  percutaneous nephroureteral catheter;  Surgeon: Kamila Thompson MD;  [facility]: [facility];  Service: Urology    MA CYSTO BLADDER W/URETERAL CATHETERIZATION Right 01/18/2023    Procedure: URETHRAL DILATION, CYSTOGRAM, CYSTOSCOPY, RIGHT RETEROGRADE PYELOGRAM, [address], COMPLEX CHICAS PLACEMENT;  Surgeon: Kamila Thompson MD;  [facility];  Service: Urology    MA CYSTO BLADDER W/URETERAL CATHETERIZATION Right 4/12/2023    Procedure: CYSTO  W/ STENT, urethral dilation with scope;  Surgeon: Kamila Thompson MD;  [facility];  Service: Urology    MA CYSTO CALIBRATION DILAT URTL STRIX/STENOSIS N/A 4/12/2023    Procedure: DILATATION URETHRAL;  Surgeon: Kamila Thompson MD;  [facility];  Service: Urology    ROTATOR CUFF REPAIR      SHOULDER SURGERY      TRANSURETHRAL RESECTION OF BLADDER TUMOR N/A 05/26/2022 "Procedure: TRANSURETHRAL RESECTION OF BLADDER TUMOR (TURBT); Surgeon: Trace Butterfield MD;  [facility]: [facility];  Service: Urology         04/30/23 1000   OT Last Visit   OT Visit Date 04/30/23   Note Type   Note type Evaluation   Pain Assessment   Pain Assessment Tool 0-10   Pain Score No Pain   Restrictions/Precautions   Weight Bearing Precautions Per Order No   Home Living   Type of [address] Two level; Able to live on main level with bedroom/bathroom   Bathroom Shower/Tub Tub/shower unit   [employer] Standard   Prior Function   Level of [place] Independent with ADLs; Independent with functional mobility; Needs assistance with IADLS   Lives With Spouse;Daughter   Receives Help From Family   IADLs Family/Friend/Other provides transportation; Family/Friend/Other provides meals; Family/Friend/Other provides medication management   Falls in the last 6 months 0   Vocational Retired   [address] and mobility - family assists with iadls   Reciprocal Relationships supportive family who are able to provide assist prn   Service to Others retired   Intrinsic Gratification mostly sedentary   [address] [place] \"I can walk\"   ADL   Eating Assistance 7  Independent   Grooming Assistance 5  Supervision/Setup   [address] 5  Supervision/Setup   [address] 5  Supervision/Setup   [address] 5  Supervision/Setup   LB Dressing Assistance 5  [address]  5  Supervision/Setup   Bed Mobility   Supine to Sit 6  Modified independent   Transfers   Sit to Stand 6  Modified independent   Stand to Sit 6  Modified independent   Functional Mobility   Functional Mobility 5  Supervision   Balance   Static Sitting Good   Dynamic Sitting Fair +   C/Geoff Julian - Maren Billy Caonilla +   Activity Tolerance   Activity Tolerance Patient tolerated treatment well   Medical Staff [address] [facility], DPT and Rizwan Perez, " SPT present for coeval 2* medical complexity, comorbidities and limited overall tolerance to activity   RUE Assessment   RUE Assessment WFL   LUE Assessment   LUE Assessment WFL   Cognition   Overall Cognitive Status WFL   Assessment   Limitation Decreased self-care trans;Decreased high-level ADLs   Prognosis Good   Assessment Pt is a 74 y o  male who was [facility]23 with Acute renal failure superimposed on stage 3 chronic kidney disease ([address] )   Pt's problem list also includes PMH of DM, previous surgery, cancer history and h/o ETOH abuse, h/o substance abuse, anemia, bladder CA, CKD, covid-19, Hep C, h/o liver transplant, cirrhosis, lung CA  At baseline pt was completing adls and mobility independently - family assists with iadls  Pt lives with spouse and dtr in 2 story home with 2 [facility]  Currently pt requires sba for overall ADLS and sba for functional mobility/transfers  Pt currently presents with impairments in the following categories -difficulty performing IADLS  and environment activity tolerance, endurance and standing balance/tolerance  These impairments, as well as pt's fatigue and risk for falls  limit pt's ability to safely engage in all baseline areas of occupation, includingfunctional mobility/transfers, community mobility, laundry , house maintenance, medication management, meal prep, cleaning, social participation  and leisure activities however has supportive family who are able to provide assist PRN - From OT standpoint, recommend home with family support upon D/C   No immediate acute OT needs indicated at this time- d/c from caseload   Goals   Patient Goals go home   Plan   OT Frequency Eval only   Recommendation   OT Discharge Recommendation No rehabilitation needs   AM-PAC Daily Activity Inpatient   Lower Body Dressing 4   Bathing 4   Toileting 4   Upper Body Dressing 4   Grooming 4   Eating 4   Daily Activity Raw Score 24   Daily Activity Standardized Score (Calc for Raw Score >=11) 57 54   AM-PAC Applied Cognition Inpatient   Following a Speech/Presentation 3   Understanding Ordinary Conversation 4   Taking Medications 4   Remembering Where Things Are Placed or Put Away 3   Remembering List of 4-5 Errands 3   Taking Care of Complicated Tasks 3   Applied Cognition Raw Score 20   Applied Cognition Standardized Score 41 76   End of Consult   Education Provided Yes   Patient Position at End of Consult Bedside chair; All needs within reach   Nurse Communication Nurse aware of consult       The patient's raw score on the AM-PAC Daily Activity Inpatient Short Form is 24  A raw score of greater than or equal to 19 suggests the patient may benefit from discharge to home  Please refer to the recommendation of the Occupational Therapist for safe discharge planning        [facility]

## 2023-04-30 NOTE — ASSESSMENT & PLAN NOTE
· Follows with urology and seen in consultation this admission  · History of cystoscopy and stent placement

## 2023-04-30 NOTE — PHYSICAL THERAPY NOTE
"                                                                                  PHYSICAL THERAPY EVALUATION          Patient Name: Ronen Howell  Today's Date: 4/30/2023 04/30/23 1446   PT Last Visit   PT Visit Date 04/30/23   Note Type   Note type Evaluation   Pain Assessment   Pain Assessment Tool 0-10   Pain Score No Pain   Restrictions/Precautions   Weight Bearing Precautions Per Order No   Other Precautions Chair Alarm; Bed Alarm;Telemetry;Multiple lines   Home Living   Type of Home House   Home Layout Two level; Able to live on main level with bedroom/bathroom;Stairs to enter with rails  (2 DAWN)   Bathroom Shower/Tub Tub/shower unit   Bathroom Toilet Standard   Bathroom Accessibility Accessible   Home Equipment   (None per pt report)   Additional Comments Pt reports living with spouse and daughter in a 2 SH with 2 DAWN  Pt resides on first floor of house with bedroom and bathroom on main level  Pt states his wife and daughter are willing and able to assist as needed  Prior Function   Level of [place] Independent with ADLs; Independent with functional mobility; Independent with IADLS   Lives With Spouse;Daughter   Receives Help From Family   IADLs Family/Friend/Other provides transportation; Independent with meal prep; Independent with medication management   Falls in the last 6 months 0   Vocational Retired   Comments Pt denies use of AD for amb PTA   General   Family/Caregiver Present No   Cognition   Overall Cognitive Status WFL   Arousal/Participation Alert   Orientation Level Oriented X4   Memory Within functional limits   Following Commands Follows all commands and directions without difficulty   Comments Pt pleasant and cooperative throughout session, agreeable to mobility     Subjective   Subjective \"I'm feeling good and in no pain\"   RLE Assessment   RLE Assessment WFL   Strength RLE   RLE Overall Strength 4+/5  (functionally assessed)   LLE Assessment   LLE Assessment WFL   Strength LLE   LLE " Overall Strength 4+/5  (functionally assessed)   Bed Mobility   Supine to Sit 6  Modified independent   Additional Comments Pt found supine at start of eval, left in bedside chair with all needs within reach   Transfers   Sit to Stand 6  Modified independent   Additional items Bedrails   Stand to Sit 6  Modified independent   Additional items Armrests   Additional Comments no AD, good overall safety and balance during transfers and static sitting and standing   Ambulation/Elevation   Gait pattern WNL; Step through pattern   Gait Assistance 6  Modified independent   Assistive Device None   Distance 90'x2   Stair Management Assistance 6  Modified independent   Stair Management Technique One rail L;Alternating pattern   Number of Stairs 4   Ambulation/Elevation Additional Comments no AD, good overall safety throughout   Balance   Static Sitting Good   Dynamic Sitting Fair +   Static Standing Fair +   Dynamic Standing Fair +   Ambulatory Fair +   Endurance Deficit   Endurance Deficit No   Activity Tolerance   Activity Tolerance Patient tolerated treatment well   Medical Staff Made Aware Howard Starks OT present for co-evaluation secondary to pt presentation, d/c planning  Thee Moss, PT present for student observation   Nurse Made Aware clearance per RN   Assessment   Prognosis Excellent   Problem List Decreased mobility   Assessment Pt presented to [facility] s/p acute renal failure superimposed on stage 3 chronic kidney disease  PMH includes anemia, arthritis, back pain, bladder cancer, cerebral artery aneurysm, CKD, DM, hepatitis C, alcoholic cirrhosis, hx of liver transplant, lung cancer, hx of CVA  Pt being seen for moderate complexity PT evaluation due to ongoing medical management for primary diagnosis, decreased activity tolerance compared to baseline, trending lab values, and presence of multiple co-morbidities   Pt reports living with spouse and daughter in a 2  with 2 DAWN, pt able to reside on first floor with bedroom and bathroom on main level  PTA pt was independent with ADLs, IADLs, and functional mobility without use of AD  Pt performs bed mobility with Mod I, transfers with Mod I, ambulation with Mod I without use of AD, and performs stair negotiation with Mod I  At conclusion of PT evaluation, pt was seated in chair with all needs within reach and chair alarm engaged  Pt presented at PT evaluation with some functional mobility capabilities in comparison to baseline; however, due to the pt's functional mobility status, social support, and accessible home environment, PT d/c recommendation when medically cleared is home with no further rehabilitation needs  D/c skilled acute care PT services  Barriers to Discharge None   Barriers to Discharge Comments Pt denies any concerns or questions regarding d/c to home at this time   Goals   Patient Goals to go home   Recommendation   PT Discharge Recommendation No rehabilitation needs   Equipment Recommended   (None)   AM-PAC Basic Mobility Inpatient   Turning in Flat Bed Without Bedrails 4   Lying on Back to Sitting on Edge of Flat Bed Without Bedrails 4   Moving Bed to Chair 4   Standing Up From Chair Using Arms 4   Walk in Room 3   Climb 3-5 Stairs With Railing 3   Basic Mobility Inpatient Raw Score 22   Basic Mobility Standardized Score 47 4   Highest Level Of Mobility   -HLM Goal 7: Walk 25 feet or more   JH-HLM Achieved 7: Walk 25 feet or more   Modified Raj Scale   Modified Raj Scale 2   Barthel Index   Feeding 10   Bathing 0   Grooming Score 5   Dressing Score 10   Bladder Score 10   Bowels Score 10   Toilet Use Score 10   Transfers (Bed/Chair) Score 15   Mobility (Level Surface) Score 10   Stairs Score 5   Barthel Index Score 85   End of Consult   Patient Position at End of Consult Bedside chair;Bed/Chair alarm activated; All needs within reach     [facility], [place], [facility]  04/30/23

## 2023-04-30 NOTE — ASSESSMENT & PLAN NOTE
Lab Results   Component Value Date    HGBA1C 7 4 (A) 12/06/2022     Recent Labs     04/29/23 2035 04/30/23  0735 04/30/23  0806 04/30/23  1120   POCGLU 128 68 91 114     · Glucose stable on levemir

## 2023-04-30 NOTE — ASSESSMENT & PLAN NOTE
· History of liver transplant on tacrolimus 1 mg twice daily    · Level is low this admission and patient should follow-up with transplant hepatologist

## 2023-04-30 NOTE — PROGRESS NOTES
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 presented to ER due to abnormal outpatient labs showing elevated creatinine  Nephrology was consulted for acute kidney injury  Acute kidney injury, POA  -Baseline creatinine: 2 4-3 0 mg/dl  -Admission creatinine: 4 06 mg/dl  - Work up:    UA with microscopy: Numerous RBCs, 10-20 WBC per high-power field, 1+ protein   Imaging: CT abdomen pelvis without contrast-right-sided ureteral stent, chronic appearance of mild to moderate right-sided hydroureteronephrosis down to bladder, cortical thinning of right kidney stable  No left hydronephrosis  -Etiology: Acute kidney injury most likely prerenal in the setting of decreased oral intake, possibility of underlying CKD progression from obstructive uropathy  Cannot rule out GN as patient has significant RBCs in the urine but as renal function already improving less likely GN   [facility] Course: Renal function improving with IV hydration to creatinine 3 28 mg/dL today  -Plan:    Renal function improving to creatinine 3 28 mg/dL today, electrolytes stable  Continue to monitor  Continue IV fluids while patient is in the hospital, currently on Plasma-Lyte at 75 mL/h  If plan for discharge, okay to stop further IV fluids      Tacrolimus level from 4/29 was actually low at 1 9, urine eosinophil 2%   SPEP UPEP light chain ratio still in the process   Monitor as outpatient for persistent hematuria, does have ureteral stent which could be contributing   Avoid hypotension                                              Chronic Kidney Disease Stage 4  -Outpatient Nephrologist: Dr Marina Guadarrama  -Baseline Creatinine: 2 4-3 0, creatinine was around 2 44 on March 28, 2023  -Etiology: Secondary to chronic obstructive uropathy, atrophic right kidney, possible chronic calcineurin inhibitor induced fibrosis  -Avoid Nephrotoxins and Dose all medications per eGFR  -Will need outpatient follow up after discharge        Metabolic acidosis, normal anion gap  -Admission bicarb was low, was 18-treated with bicarb drip and bicarb level improved to normal range at 25, currently on IV fluids Plasma-Lyte and bicarb normal range, continue to monitor  Last bicarb level 25 today     BP   -Home Medication: None  -Currently BP stable and is at goal  -Plan: Continue to monitor blood pressure  Avoid hypotension     Anemia, unspecified: Hemoglobin trending down to 7 2 g/dL continue to monitor  -MCV 76  Also with thrombocytopenia which is chronic, continue to monitor     Chronic right hydronephrosis  -Status post ureteral stent on the right, last stent exchange was on April 12, 2022 and noted plan to change stent once in a year  -CT after admission suggestive of right hydronephrosis, patient was seen by urology who suggested to continue to monitor as renal function already improving and to check postvoid residual   So far bladder scan has been negative     Liver transplant/hep C status posttreatment  -Currently on tacrolimus 1 mg every 12 hours and follows up [address] for liver transplant      Above plan regarding acute kidney injury was discussed with primary team and agreed with the plan      SUBJECTIVE:  No new complaints  No chest pain or shortness of breath    OBJECTIVE:  Current Weight: Weight - Scale: 57 2 kg (126 lb)  Vitals:    04/30/23 0707   BP: 115/60   Pulse: 64   Resp: 16   Temp: 98 3 °F (36 8 °C)   SpO2: 100%       Intake/Output Summary (Last 24 hours) at 4/30/2023 0938  Last data filed at 4/30/2023 0736  Gross per 24 hour   Intake 400 ml   Output 1575 ml   Net -1175 ml       Physical Exam  General:  Ill looking, awake    Eyes: Conjunctivae pink,  Sclera anicteric  ENT: lips and mucous membranes moist  Neck: supple   Chest: Clear to Auscultation both lungs,  no crackles, ronchus or wheezing  CVS: S1 & S2 present, normal rate, regular rhythm, no murmur    Abdomen: soft, non-tender, non-distended, Bowel sounds normoactive  Extremities: no edema of  legs  Skin: no rash  Neuro: awake, alert, oriented x 3   Psych: Mood and affect appropriate     Medications:    Current Facility-Administered Medications:     acetaminophen (TYLENOL) tablet 650 mg, 650 mg, Oral, Q6H PRN, Frank Joya MD    albuterol (PROVENTIL HFA,VENTOLIN HFA) inhaler 2 puff, 2 puff, Inhalation, Q6H PRN, Frank Joya MD    insulin detemir (LEVEMIR) subcutaneous injection 15 Units, 15 Units, Subcutaneous, HS, Frank Joya MD, 15 Units at 04/29/23 2115    insulin lispro (HumaLOG) 100 units/mL subcutaneous injection 1-5 Units, 1-5 Units, Subcutaneous, TID AC, 2 Units at 04/29/23 1617 **AND** Fingerstick Glucose (POCT), , , TID AC, Frank Joya MD    insulin lispro (HumaLOG) 100 units/mL subcutaneous injection 1-5 Units, 1-5 Units, Subcutaneous, HS, Frank Joya MD, 1 Units at 04/27/23 2236    multi-electrolyte (PLASMALYTE-A/ISOLYTE-S PH 7 4) IV solution, 75 mL/hr, Intravenous, Continuous, YAMILKA Westbrook, Last Rate: 75 mL/hr at 04/30/23 0830, 75 mL/hr at 04/30/23 0830    multivitamin stress formula tablet 1 tablet, 1 tablet, Oral, Daily, Frank Joya MD, 1 tablet at 04/30/23 0831    oxybutynin (DITROPAN) tablet 5 mg, 5 mg, Oral, BID PRN, Frank Joya MD    pregabalin (LYRICA) capsule 75 mg, 75 mg, Oral, BID, Frank Joya MD, 75 mg at 04/30/23 0830    tacrolimus (PROGRAF) capsule 1 mg, 1 mg, Oral, Q12H, Frank Joya MD, 1 mg at 04/30/23 0831    tamsulosin (FLOMAX) capsule 0 4 mg, 0 4 mg, Oral, Daily With Dinner, Frank Joya MD, 0 4 mg at 04/29/23 1617    temazepam (RESTORIL) capsule 15 mg, 15 mg, Oral, HS, Frank Joya MD, 15 mg at 04/29/23 211    zolpidem "(AMBIEN) tablet 5 mg, 5 mg, Oral, HS, Ilene De Luna MD, 5 mg at 04/29/23 2114    Invasive Devices:        Lab Results:   Results from last 7 days   Lab Units 04/30/23  0525 04/29/23  0552 04/28/23  0451 04/27/23  1220   WBC Thousand/uL 4 78 4 51 4 66 6 12   HEMOGLOBIN g/dL 7 2* 7 2* 7 4* 7 9*   HEMATOCRIT % 22 9* 22 9* 22 8* 24 7*   PLATELETS Thousands/uL 53* 50* 58* 65*   POTASSIUM mmol/L 4 0 3 7 3 5 4 4   CHLORIDE mmol/L 112* 112* 112* 115*   CO2 mmol/L 25 25 24 18*   BUN mg/dL 44* 47* 54* 64*   CREATININE mg/dL 3 28* 3 35* 3 62* 4 06*   CALCIUM mg/dL 7 4* 7 4* 7 6* 8 1*   ALK PHOS U/L  --   --   --  76   ALT U/L  --   --   --  52   AST U/L  --   --   --  62*       Previous work up:         Portions of the record may have been created with voice recognition software  Occasional wrong word or \"sound a like\" substitutions may have occurred due to the inherent limitations of voice recognition software  Read the chart carefully and recognize, using context, where substitutions have occurred  If you have any questions, please contact the dictating provider    "

## 2023-04-30 NOTE — DISCHARGE SUMMARY
[facility]  Discharge- Babs Beatty 1950, 74 y o  male MRN: [id number]  [facility] Encounter: [id number]  Primary Care Provider: Vonda Power DO   Date and time admitted to hospital: 4/27/2023 10:18 AM    Admitting Provider:  Monica Bauer MD  Discharge Provider:  Cecille Dave DO  Admission Date: 4/27/2023       Discharge Date: 04/30/23   LOS: 3  Primary Care Physician at Discharge: Vonda Power [phone]    DISCHARGE DIAGNOSES  * Acute renal failure superimposed on stage 3 chronic kidney disease [facility])  Assessment & Plan  Background: History of diabetes liver transplant and CKD who presented to the hospital with abnormal labs  · Kidney injury may be secondary to poor intake  Responded nicely to IV fluids  · Baseline creatinine closer to 3 0   · Nephrology cleared him for discharge with repeat blood work in 4 days and follow-up with Dr Kwasi Michele    Results from last 7 days   Lab Units 04/30/23  0525 04/29/23  0552 04/28/23  0451 04/27/23  1220 04/26/23  1321   BUN mg/dL 44* 47* 54* 64* 64*   CREATININE mg/dL 3 28* 3 35* 3 62* 4 06* 4 18*   EGFR ml/min/1 73sq m 17 16 15 13 13       Bicytopenia  Assessment & Plan  · Chronic anemia and thrombocytopenia likely in setting of CKD and liver disease  · No bleeding    Results from last 7 days   Lab Units 04/30/23  0525 04/29/23  0552 04/28/23  0451 04/27/23  1220   HEMOGLOBIN g/dL 7 2* 7 2* 7 4* 7 9*   PLATELETS Thousands/uL 53* 50* 58* 65*       History of hydronephrosis  Assessment & Plan  · Follows with urology and seen in consultation this admission  · History of cystoscopy and stent placement      Type 2 diabetes mellitus with diabetic peripheral angiopathy without gangrene, with long-term current use of insulin [facility])  Assessment & Plan  Lab Results   Component Value Date    HGBA1C 7 4 (A) 12/06/2022     Recent Labs     04/29/23  2035 04/30/23  0735 04/30/23  0806 04/30/23  1120   POCGLU 128 68 91 114 · Glucose stable on levemir    Peripheral neuropathy  Assessment & Plan  · Continue pregabalin    Liver transplant status ([facility] [address] )  Assessment & Plan  · History of liver transplant on tacrolimus 1 mg twice daily  · Level is low this admission and patient should follow-up with transplant hepatologist    REASON FOR ADMISSION  Evaluation of Abnormal Diagnostic Test (Pt had blood work yesterday and was told to come to ED  Family reports she thinks his kidney levels are abnormal  Patient denies any complaints )    HOSPITAL COURSE:  Anh Vance is a 74 y o  male with a history of liver transplant CKD and insulin-dependent diabetes who presented to the hospital for abnormal labs  The patient was noted to have kidney injury  Spouse reports he only drinks 2 bottles of water daily and does not have much fluid intake  During hospitalization he was hydrated with IV fluids  He was seen by urology given persistent hydronephrosis  No intervention was recommended  Renal function improved  He is being discharged home with instructions to recheck BMP in 4 days  The case was discussed with spouse on telephone on day of discharge  Please see problem list listed above  CONSULTING PROVIDERS   IP CONSULT TO INTERNAL MEDICINE  IP CONSULT TO UROLOGY  IP CONSULT TO NEPHROLOGY    PROCEDURES PERFORMED  * No surgery found *    RADIOLOGY RESULTS  CT abdomen pelvis wo contrast    Result Date: 4/27/2023  Impression: 1  Overall stable exam  Right-sided ureteral stent remains in position  Chronic appearance of mild to moderate right-sided hydroureteronephrosis down to the bladder  Nonspecific bilateral perinephric and right periureteral fat stranding similar to the prior study   Workstation performed: [id number]       LABS  Results from last 7 days   Lab Units 04/30/23  0525 04/29/23  0552 04/28/23  0451 04/27/23  1220 04/26/23  1321   WBC Thousand/uL 4 78 4 51 4 66 6 12 6 25   HEMOGLOBIN g/dL 7 2* 7 2* 7 4* 7 9* 8 3* HEMATOCRIT % 22 9* 22 9* 22 8* 24 7* 26 2*   MCV fL 77* 76* 76* 77* 77*   PLATELETS Thousands/uL 53* 50* 58* 65* 76*     Results from last 7 days   Lab Units 04/30/23  0525 04/29/23  0552 04/28/23  0451 04/27/23  1220 04/26/23  1321   SODIUM mmol/L 139 139 139 138 138   POTASSIUM mmol/L 4 0 3 7 3 5 4 4 4 5   CHLORIDE mmol/L 112* 112* 112* 115* 117*   CO2 mmol/L 25 25 24 18* 18*   BUN mg/dL 44* 47* 54* 64* 64*   CREATININE mg/dL 3 28* 3 35* 3 62* 4 06* 4 18*   CALCIUM mg/dL 7 4* 7 4* 7 6* 8 1* 8 2*   ALBUMIN g/dL  --   --   --  2 9*  --    TOTAL BILIRUBIN mg/dL  --   --   --  0 17*  --    ALK PHOS U/L  --   --   --  76  --    ALT U/L  --   --   --  52  --    AST U/L  --   --   --  57*  --    EGFR ml/min/1 73sq m 17 16 15 13 13   GLUCOSE RANDOM mg/dL 136 106 100 144* 209*                      Results from last 7 days   Lab Units 04/30/23  1120 04/30/23  0806 04/30/23  0735 04/29/23  2035 04/29/23  1601 04/29/23  1114 04/29/23  0700 04/28/23  2045 04/28/23  1615 04/28/23  1103   POC GLUCOSE mg/dl 114 91 68 128 236* 109 75 147* 169* 86                       Cultures:   Results from last 7 days   Lab Units 04/27/23  1226   COLOR UA  Light Yellow   CLARITY UA  Turbid   SPEC GRAV UA  1 013   PH UA  5 5   LEUKOCYTES UA  Small*   NITRITE UA  Negative   GLUCOSE UA mg/dl 300 (3/10%)*   KETONES UA mg/dl Negative   BILIRUBIN UA  Negative   BLOOD UA  Large*      Results from last 7 days   Lab Units 04/27/23  1226   RBC UA /hpf Innumerable*   WBC UA /hpf 10-20*   EPITHELIAL CELLS WET PREP /hpf Occasional   BACTERIA UA /hpf Occasional      Results from last 7 days   Lab Units 04/27/23  1226   URINE CULTURE  70,000-79,000 cfu/ml Alpha Hemolytic Streptococcus NOT Enterococcus*  <10,000 cfu/ml       DISCHARGE DAY VISIT AND PHYSICAL EXAM:  Subjective: Patient seen and examined  Dressed and requesting discharge home      Vitals:   Blood Pressure: 115/60 (04/30/23 0707)  Pulse: 64 (04/30/23 0707)  Temperature: 98 3 °F (36 8 °C) "(04/30/23 0707)  Temp Source: Oral (04/30/23 0707)  Respirations: 16 (04/30/23 0707)  Height: 5' 2\" (157 5 cm) (04/27/23 1929)  Weight - Scale: 57 2 kg (126 lb) (04/27/23 1929)  SpO2: 100 % (04/30/23 0707)    Physical Exam  Vitals reviewed  Constitutional:       General: He is not in acute distress  HENT:      Head: Atraumatic  Cardiovascular:      Rate and Rhythm: Regular rhythm  Heart sounds: Normal heart sounds  Pulmonary:      Effort: Pulmonary effort is normal       Breath sounds: No wheezing  Abdominal:      General: Bowel sounds are normal       Palpations: Abdomen is soft  Tenderness: There is no abdominal tenderness  There is no rebound  Musculoskeletal:         General: No swelling or tenderness  Skin:     General: Skin is warm and dry  Neurological:      Mental Status: He is alert  Cranial Nerves: No cranial nerve deficit  Psychiatric:         Mood and Affect: Mood normal        Planned Re-admission: No  Discharge Disposition: Home/Self Care    Test Results Pending at Discharge:   Pending Labs     Order Current Status    Protein electrophoresis, urine Collected (04/28/23 1653)    Immunoglobulin free LT chains blood In process    Protein electrophoresis, serum In process      Incidental findings: Low tacrolimus level  Follow-up with transplant hepatologist     Medications   · Discharge Medication List: See after visit summary for reconciled discharge medications  Diet restrictions: Diabetic diet  Activity restrictions: No strenuous activity  Discharge Condition: stable    Outpatient Follow-Up and Discharge Instructions  See after visit summary section titled Discharge Instructions for information provided to patient and family  Code Status: Level 1 - Full Code  Discharge Statement   I spent 35 minutes discharging the patient  This time was spent on the day of discharge   Greater than 50% of total time was spent with the patient and / or family counseling and / or " coordination of care  ** Please Note: This note has been constructed using a voice recognition system   **

## 2023-04-30 NOTE — PHYSICAL THERAPY NOTE
"   PHYSICAL THERAPY EVALUATION  NAME:  Alan Danielson  DATE: 04/30/23    AGE:   74 y o    Mrn:   [id number]  ADMIT DX:  Inguinal hernia [K40 90]  Hydroureteronephrosis [N13 30]  Anemia [D64 9]  Pulmonary nodule [R91 1]  Kidney problem [N28 9]  Acute kidney injury ([address] ) [N17 9]  Other hydronephrosis [N13 39]  Asymptomatic microscopic hematuria [R31 21]  Budding yeast detected [B37 9]  Ureteral stent present [Z96 0]    Past Medical History:   Diagnosis Date    Acute urinary retention 09/12/2022    Alcoholism ([address] )     \"Sober over 21 years\"    Anemia     Arthritis     Back pain     Bladder cancer ([facility] )     Bruises easily     Cerebral artery aneurysm     Change in mental state     last assessed 5/18/15; resolved 10/27/15    Chronic kidney disease     COVID-19 2022 not hospitalized fully recovered    Diabetes mellitus ([facility] )     Difficult intravenous access     Drug dependence ([facility] )     \"in the past--40yrs ago per sig other\"    Fatigue     last assessed 1/26/15; resolved 5/24/16    Full dentures     Hepatitis C     \"had treatment\"    History of cancer chemotherapy     History of radiation therapy     History of transfusion     per sig other \"had blood transfusion when anemic from urinary bleeding\"    Hospital discharge follow-up 12/21/2018    Hx of liver transplant ([address] )     Hydronephrosis of right kidney     Insomnia     \"after liver transplant\"    Kidney disease     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[facility] )     Liver cirrhosis ([facility] )     Liver transplant recipient ([facility] )     Nino Tierney\"    Lung cancer [facility])     Theresa Sahni Hematology/oncology Dr Es Frye in place     Renal disorder     Shortness of breath     \"when moving around-started on inhalers\"    Stroke (cerebrum) ([address] )     Stroke ([address] )     diff short term memory    Subdural hygroma     2/27/14; resolved 7/28/15    Thrombocytopenia ([facility] ) 09/20/2017    Uses Croatian as primary spoken language        Past Surgical History:   Procedure Laterality Date    BRAIN " SURGERY  02/12/2014    left frontotemporal 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    CATARACT EXTRACTION      CHOLECYSTECTOMY      COLONOSCOPY      CYSTOSCOPY  11/30/2022    [place]    FL LUMBAR PUNCTURE DIAGNOSTIC  12/14/2018    FL RETROGRADE PYELOGRAM  01/18/2023    FL RETROGRADE PYELOGRAM  4/12/2023    HEPATITIS B SURFACE AB QN,LIVER TRANSPLANT (HISTORICAL)      IR BIOPSY LUNG  02/20/2023    IR CHEST TUBE PLACEMENT  02/25/2023    IR NEPHROSTOMY TUBE CHECK/CHANGE/REPOSITION/REINSERTION/UPSIZE  07/29/2022    IR NEPHROSTOMY TUBE CHECK/CHANGE/REPOSITION/REINSERTION/UPSIZE  08/31/2022    IR NEPHROSTOMY TUBE CHECK/CHANGE/REPOSITION/REINSERTION/UPSIZE  10/07/2022    IR NEPHROSTOMY TUBE CHECK/CHANGE/REPOSITION/REINSERTION/UPSIZE  12/03/2022    IR NEPHROSTOMY TUBE PLACEMENT  05/28/2022    IR PICC LINE  12/14/2018    IR PORT PLACEMENT  06/23/2022    LIVER TRANSPLANTATION      LUNG BIOPSY      NEPHROSTOMY TUBE CHANGE N/A 01/18/2023    Procedure: Removal of  percutaneous nephroureteral catheter;  Surgeon: Pratik Solorio MD;  [facility];  Service: Urology    MI CYSTO BLADDER W/URETERAL CATHETERIZATION Right 01/18/2023    Procedure: URETHRAL DILATION, CYSTOGRAM, CYSTOSCOPY, RIGHT RETEROGRADE PYELOGRAM, [address], COMPLEX CHICAS PLACEMENT;  Surgeon: Pratik Solorio MD;  [facility];  Service: Urology    MI CYSTO BLADDER W/URETERAL CATHETERIZATION Right 4/12/2023    Procedure: CYSTO  W/ STENT, urethral dilation with scope;  Surgeon: Pratik Solorio MD;  [facility];  Service: Urology    MI CYSTO CALIBRATION DILAT URTL STRIX/STENOSIS N/A 4/12/2023    Procedure: DILATATION URETHRAL;  Surgeon: Pratik Solorio MD;  [facility];  Service: Urology    ROTATOR CUFF REPAIR      SHOULDER SURGERY      TRANSURETHRAL RESECTION OF BLADDER TUMOR N/A 05/26/2022    Procedure: TRANSURETHRAL RESECTION OF BLADDER TUMOR (TURBT);   Surgeon: "Tyler Decker MD;  [facility];  Service: Urology       Length Of Stay: 3    PHYSICAL THERAPY EVALUATION:        04/30/23 0952   PT Last Visit   PT Visit Date 04/30/23   Note Type   Note type Evaluation   Pain Assessment   Pain Assessment Tool 0-10   Pain Score No Pain   Restrictions/Precautions   Weight Bearing Precautions Per Order No   Other Precautions Chair Alarm; Bed Alarm;Telemetry;Multiple lines   Home Living   Type of Home House   Home Layout Two level; Able to live on main level with bedroom/bathroom;Stairs to enter with rails  (2 DAWN)   Bathroom Shower/Tub Tub/shower unit   Bathroom Toilet Standard   Bathroom Accessibility Accessible   Home Equipment   (None per pt report)   Additional Comments Pt reports living with spouse and daughter in a 2 SH with 2 DAWN  Pt resides on first floor of house with bedroom and bathroom on main level  Pt states his wife and daughter are willing and able to assist as needed  Prior Function   Level of [place] Independent with ADLs; Independent with functional mobility; Independent with IADLS   Lives With Spouse;Daughter   Receives Help From Family   IADLs Family/Friend/Other provides transportation; Independent with meal prep; Independent with medication management   Falls in the last 6 months 0   Vocational Retired   Comments Pt denies use of AD for amb PTA   General   Family/Caregiver Present No   Cognition   Overall Cognitive Status WFL   Arousal/Participation Alert   Orientation Level Oriented X4   Memory Within functional limits   Following Commands Follows all commands and directions without difficulty   Comments Pt pleasant and cooperative throughout session, agreeable to mobility     Subjective   Subjective \"I'm feeling good and in no pain\"   RLE Assessment   RLE Assessment WFL   Strength RLE   RLE Overall Strength 4+/5  (functionally assessed)   LLE Assessment   LLE Assessment WFL   Strength LLE   LLE Overall Strength 4+/5  (functionally assessed) " Bed Mobility   Supine to Sit 6  Modified independent   Additional Comments Pt found supine at start of eval, left in bedside chair with all needs within reach   Transfers   Sit to Stand 6  Modified independent   Additional items Bedrails   Stand to Sit 6  Modified independent   Additional items Armrests   Additional Comments no AD, good overall safety and balance during transfers and static sitting and standing   Ambulation/Elevation   Gait pattern WNL; Step through pattern   Gait Assistance 6  Modified independent   Assistive Device None   Distance 90'x2   Stair Management Assistance 6  Modified independent   Stair Management Technique One rail L;Alternating pattern   Number of Stairs 4   Ambulation/Elevation Additional Comments no AD, good overall safety throughout   Balance   Static Sitting Good   Dynamic Sitting Fair +   Static Standing Fair +   Dynamic Standing Fair +   Ambulatory Fair +   Endurance Deficit   Endurance Deficit No   Activity Tolerance   Activity Tolerance Patient tolerated treatment well   Medical Staff Made Aware Phuong Cerda, OT present for co-evaluation secondary to pt presentation, d/c planning  Kecia Mock, PT present for student observation   Nurse Made Aware clearance per RN   Assessment   Prognosis Excellent   Problem List Decreased mobility   Assessment Pt presented to [facility] s/p acute renal failure superimposed on stage 3 chronic kidney disease  PMH includes anemia, arthritis, back pain, bladder cancer, cerebral artery aneurysm, CKD, DM, hepatitis C, alcoholic cirrhosis, hx of liver transplant, lung cancer, hx of CVA  Pt being seen for moderate complexity PT evaluation due to ongoing medical management for primary diagnosis, decreased activity tolerance compared to baseline, trending lab values, and presence of multiple co-morbidities  Pt reports living with spouse and daughter in a 2  with 2 DAWN, pt able to reside on first floor with bedroom and bathroom on main level   PTA pt was independent with ADLs, IADLs, and functional mobility without use of AD  Pt performs bed mobility with Mod I, transfers with Mod I, ambulation with Mod I without use of AD, and performs stair negotiation with Mod I  At conclusion of PT evaluation, pt was seated in chair with all needs within reach and chair alarm engaged  Pt presented at PT evaluation with some functional mobility capabilities in comparison to baseline; however, due to the pt's functional mobility status, social support, and accessible home environment, PT d/c recommendation when medically cleared is home with no further rehabilitation needs  D/c skilled acute care PT services  Barriers to Discharge None   Barriers to Discharge Comments Pt denies any concerns or questions regarding d/c to home at this time   Goals   Patient Goals to go home   Recommendation   PT Discharge Recommendation No rehabilitation needs   Equipment Recommended   (None)   AM-PAC Basic Mobility Inpatient   Turning in Flat Bed Without Bedrails 4   Lying on Back to Sitting on Edge of Flat Bed Without Bedrails 4   Moving Bed to Chair 4   Standing Up From Chair Using Arms 4   Walk in Room 3   Climb 3-5 Stairs With Railing 3   Basic Mobility Inpatient Raw Score 22   Basic Mobility Standardized Score 47 4   Highest Level Of Mobility   -HLM Goal 7: Walk 25 feet or more   JH-HLM Achieved 7: Walk 25 feet or more   Modified Haven Scale   Modified Haven Scale 2   Barthel Index   Feeding 10   Bathing 0   Grooming Score 5   Dressing Score 10   Bladder Score 10   Bowels Score 10   Toilet Use Score 10   Transfers (Bed/Chair) Score 15   Mobility (Level Surface) Score 10   Stairs Score 5   Barthel Index Score 85   End of Consult   Patient Position at End of Consult Bedside chair;Bed/Chair alarm activated; All needs within reach   Portions of the documentation may have been created using voice recognition software  Occasional wrong word or sound alike substitutions may have occurred due to the inherent limitations of the voice recognition software  Read the chart carefully and recognize, using context, where substitutions have occurred      Lyn Aguilar, PT, DPT

## 2023-04-30 NOTE — QUICK NOTE
"Patient seen and examined  At the time of my visit, he was getting dressed  He reports plans for discharge today  Creatinine has continued to slowly vilma  He continues to be asymptomatic     /60   Pulse 64   Temp 98 3 °F (36 8 °C) (Oral)   Resp 16   Ht 5' 2\" (1 575 m)   Wt 57 2 kg (126 lb)   SpO2 100%   BMI 23 05 kg/m²      Lab Results   Component Value Date    SODIUM 139 04/30/2023    K 4 0 04/30/2023     (H) 04/30/2023    CO2 25 04/30/2023    BUN 44 (H) 04/30/2023    CREATININE 3 28 (H) 04/30/2023    GLUC 136 04/30/2023    CALCIUM 7 4 (L) 04/30/2023     PLAN: Patient has follow-up with nephrology on May 18  I will request a renal bladder ultrasound within the next 2 weeks prior to this visit  Urology will sign off, please contact us with additional questions or concerns    "

## 2023-04-30 NOTE — ASSESSMENT & PLAN NOTE
· Chronic anemia and thrombocytopenia likely in setting of CKD and liver disease  · No bleeding    Results from last 7 days   Lab Units 04/30/23  0525 04/29/23  0552 04/28/23  0451 04/27/23  1220   HEMOGLOBIN g/dL 7 2* 7 2* 7 4* 7 9*   PLATELETS Thousands/uL 53* 50* 58* 65*

## 2023-04-30 NOTE — ASSESSMENT & PLAN NOTE
Background: History of diabetes liver transplant and CKD who presented to the hospital with abnormal labs  · Kidney injury may be secondary to poor intake  Responded nicely to IV fluids    · Baseline creatinine closer to 3 0   · Nephrology cleared him for discharge with repeat blood work in 4 days and follow-up with Dr Box Appl    Results from last 7 days   Lab Units 04/30/23  0525 04/29/23  0552 04/28/23  0451 04/27/23  1220 04/26/23  1321   BUN mg/dL 44* 47* 54* 64* 64*   CREATININE mg/dL 3 28* 3 35* 3 62* 4 06* 4 18*   EGFR ml/min/1 73sq m 17 16 15 13 13

## 2023-05-01 ENCOUNTER — TRANSITIONAL CARE MANAGEMENT (OUTPATIENT)
Dept: FAMILY MEDICINE CLINIC | Facility: CLINIC | Age: 73
End: 2023-05-01

## 2023-05-01 NOTE — UTILIZATION REVIEW
NOTIFICATION OF ADMISSION DISCHARGE   This is a Notification of Discharge from [address] [place] [address]  Please be advised that this patient has been discharge from our facility  Below you will find the admission and discharge date and time including the patients disposition  UTILIZATION REVIEW CONTACT:  Yesenia Traylor  Utilization   Network Utilization Review Department  Phone: [phone] x carefully listen to the prompts  All voicemails are confidential   Email: [email or web address]     ADMISSION INFORMATION  PRESENTATION DATE: 4/27/2023 10:18 AM  OBERVATION ADMISSION DATE:   INPATIENT ADMISSION DATE: 4/27/23  4:17 PM   DISCHARGE DATE: 4/30/2023  1:48 PM   DISPOSITION:Home/Self Care    IMPORTANT INFORMATION:  Send all requests for admission clinical reviews, approved or denied determinations and any other requests to dedicated fax number below belonging to the campus where the patient is receiving treatment   List of dedicated fax numbers:  [address] DENIALS (Administrative/Medical Necessity) [phone]   [address] (Maternity/NICU/Pediatrics) [phone]   [facility] [phone]   [address] [place] [address] [phone]   [address] [place] [address] [phone]   2000 [facility] [address] [place] [address],[address] 1525 [facility] [phone]   [facility]  [phone]   220[address] [facility] [address]  2401 [facility] 1000 [address] [facility] [phone]

## 2023-05-01 NOTE — TELEPHONE ENCOUNTER
Called and spoke with [facility], patients significant other  Advised that Dr Ruslan Woodward ordered a BMP and renal US to be completed 2 weeks from now to re-evaluate CARLOTTA  Central scheduling phone number provided  [facility] will contact central scheduling to schedule imaging in 2 weeks, and have patient complete BMP in 2 weeks as well

## 2023-05-02 ENCOUNTER — OFFICE VISIT (OUTPATIENT)
Dept: NEUROLOGY | Facility: CLINIC | Age: 73
End: 2023-05-02

## 2023-05-02 VITALS
WEIGHT: 125.4 LBS | SYSTOLIC BLOOD PRESSURE: 130 MMHG | BODY MASS INDEX: 22.94 KG/M2 | RESPIRATION RATE: 14 BRPM | TEMPERATURE: 97.2 F | OXYGEN SATURATION: 100 % | DIASTOLIC BLOOD PRESSURE: 60 MMHG | HEART RATE: 66 BPM

## 2023-05-02 DIAGNOSIS — I67.1 CEREBRAL ANEURYSM: ICD-10-CM

## 2023-05-02 DIAGNOSIS — Z86.73 HISTORY OF CVA (CEREBROVASCULAR ACCIDENT): Primary | Chronic | ICD-10-CM

## 2023-05-02 DIAGNOSIS — Z79.4 TYPE 2 DIABETES MELLITUS WITH DIABETIC PERIPHERAL ANGIOPATHY WITHOUT GANGRENE, WITH LONG-TERM CURRENT USE OF INSULIN (HCC): ICD-10-CM

## 2023-05-02 DIAGNOSIS — E11.51 TYPE 2 DIABETES MELLITUS WITH DIABETIC PERIPHERAL ANGIOPATHY WITHOUT GANGRENE, WITH LONG-TERM CURRENT USE OF INSULIN (HCC): ICD-10-CM

## 2023-05-02 LAB
KAPPA LC FREE SER-MCNC: 120 MG/L (ref 3.3–19.4)
KAPPA LC FREE/LAMBDA FREE SER: 1.88 {RATIO} (ref 0.26–1.65)
LAMBDA LC FREE SERPL-MCNC: 64 MG/L (ref 5.7–26.3)

## 2023-05-02 RX ORDER — CLOPIDOGREL BISULFATE 75 MG/1
75 TABLET ORAL DAILY
Qty: 30 TABLET | Refills: 3 | Status: SHIPPED | OUTPATIENT
Start: 2023-05-02 | End: 2023-05-05

## 2023-05-02 NOTE — PROGRESS NOTES
[facility]  Progress Note - [place] Chantelle 1950, 74 y o  male MRN: [id number]  Unit/Bed#: -01 Encounter: [id number]  Primary Care Provider: Yony Lewis DO   Date and time admitted to hospital: 5/23/2022  4:44 AM    * Bladder tumor  Assessment & Plan  · New diagnosis  · Presented on 5/23 with gross hematuria  · CT A/P - large bladder mass with persistence of right sided hydronephrosis  · Large posterior bladder tumor noted on cystoscopy on 5/26  · S/p TURBT on 5/26/22  Concern for invasion into the deeper layers of the bladder  · Hematuria resolved  · Webb to be maintained till early next week per urology  · F/u pathology  · Urology input appreciated      Gross hematuria  Assessment & Plan  · As above  · Restart Plavix when ok with urology    Hydronephrosis of right kidney  Assessment & Plan  · Persistence noted on CT on  5/23  · Right ureteral orifice could not be visualized during TURBT and unable to do ureteroscopy/stent  · Renal US (5/27) - moderate to severe right hydronephrosis     · S/p right PCN on 5/28         Fever  Assessment & Plan  · Febrile, hx liver transplant, cystoscopy 05/26/2022, large bladder mass resection/biopsy, right-sided hydronephrosis   · Urinalysis/UC unremarkable on admission, WBC stable, elevated procalcitonin  · Etiology likely post surgical procedure translocation vs right-sided hydronephrosis  · Chest x-ray imaging unremarkable for consolidation/effusion, UA positive WBC, elevated procalcitonin  · Continue cefepime, dosing per renal adjustment, follow blood culture data  · Infectious disease consult, appreciate recommendations  · S/p right PCN on 5/28      Acute kidney injury superimposed on CKD-4  Assessment & Plan  Lab Results   Component Value Date    EGFR 17 05/26/2022    EGFR 18 05/25/2022    EGFR 20 05/23/2022    CREATININE 3 28 (H) 05/26/2022    CREATININE 3 08 (H) 05/25/2022    CREATININE 2 94 (H) 05/23/2022 · Follows with [address] Nephrology  · Previous baseline creatinine 1 7 to 2 2 but 2 8 to 2 9 since March  · Creatinine peaked at 3 24  · Increase in creatinine possibly due to right hydronephrosis   · S/p right PCN on 5/28  · Creatinine improved to 2 7 today  · [address] transplant for potential renal transplant in future  · Avoid nephrotoxins and hypotension   · Monitor BMP daily  · Nephrology consulted, appreciate recommendations      Acute blood loss anemia  Assessment & Plan  · Due to hematuria/TURBT  · Baseline Hb 10-11  · Drop in Hb to 7 1 on 5/27 from 9 7 on admission  · S/p 1 PRBC on 5/27  · Hb now stable at 8 to 8 5  · Monitor Hb  · Transfuse as needed        Type 2 diabetes mellitus with diabetic peripheral angiopathy without gangrene, with long-term current use of insulin [facility])  Assessment & Plan  Lab Results   Component Value Date    HGBA1C 7 2 (H) 02/10/2022       Recent Labs     05/25/22  2113 05/26/22  0611 05/26/22  0624 05/26/22  0849   POCGLU 172* 45* 294* 92       · Home med : Levemir 22 hs  · Dose of Levemir was decreased due to hypoglycemia   Received 12 units last night  · Now hyperglycemic  · Increase Lantus to 16 hs  · Change diet to diabetic  · Ct SSI  · Monitor blood sugars and adjust insulin as needed      Liver transplant status ([facility] )  Assessment & Plan  · History of liver transplant in 2003, maintained on Tacrolimus managed by [facility] transplant  · Continue home dose of Prograf 1 mg q 12 hours  · F/u Tacrolimus level     History of CVA (cerebrovascular accident)  Assessment & Plan  · History of prior CVA, currently without new focal deficits  · Plavix on hold given gross hematuria, resume when cleared with Urology     Problem with vascular access  Assessment & Plan  · Status post R brachial PICC placement on 5/24    Peripheral neuropathy  Assessment & Plan  · Ct Lyrica    VTE Pharmacologic Prophylaxis: VTE Score: 4 Moderate Risk (Score 3-4) - Pharmacological DVT Prophylaxis Contraindicated  Sequential Compression Devices Ordered  Patient Centered Rounds: Discussed with RN  Discussions with Specialists or Other Care Team Provider: Discussed with Dr Frances Huntley    Education and Discussions with Family / Patient: Updated  (significant other) via phone  Time Spent for Care: 20 minutes  More than 50% of total time spent on counseling and coordination of care as described above  Current Length of Stay: 6 day(s)  Current Patient Status: Inpatient   Certification Statement: The patient will continue to require additional inpatient hospital stay due to possible complicated UTI on IV antibiotics    Code Status: Level 2 - DNAR: but accepts endotracheal intubation    Subjective:   Feels much better today  No fever  No hematuria  No nausea, vomiting or diarrhea  No abdominal pain  Objective:     Vitals:   Temp (24hrs), Av 1 °F (36 7 °C), Min:98 °F (36 7 °C), Max:98 2 °F (36 8 °C)    Temp:  [98 °F (36 7 °C)-98 2 °F (36 8 °C)] 98 °F (36 7 °C)  HR:  [72-76] 74  Resp:  [16] 16  BP: (115-124)/(64-74) 115/64  SpO2:  [91 %-94 %] 92 %    Physical Exam:   Physical Exam  Vitals reviewed  HENT:      Head: Normocephalic  Nose: Nose normal       Mouth/Throat:      Mouth: Mucous membranes are moist    Eyes:      Extraocular Movements: Extraocular movements intact  Cardiovascular:      Rate and Rhythm: Normal rate and regular rhythm  Pulmonary:      Effort: Pulmonary effort is normal  No respiratory distress  Breath sounds: Normal breath sounds  No wheezing  Abdominal:      General: Bowel sounds are normal  There is no distension  Palpations: Abdomen is soft  Tenderness: There is no abdominal tenderness  Musculoskeletal:         General: No swelling  Cervical back: Neck supple  Skin:     General: Skin is warm and dry  Neurological:      General: No focal deficit present        Mental Status: He is alert and oriented to person, place, and time    Psychiatric:         Mood and Affect: Mood normal          Behavior: Behavior normal           Additional Data:     Labs:  Results from last 7 days   Lab Units 05/29/22  0603   WBC Thousand/uL 8 25   HEMOGLOBIN g/dL 8 5*   HEMATOCRIT % 27 2*   PLATELETS Thousands/uL 91*   NEUTROS PCT % 84*   LYMPHS PCT % 10*   MONOS PCT % 5   EOS PCT % 0     Results from last 7 days   Lab Units 05/29/22  0603 05/28/22  0537 05/27/22  0502   SODIUM mmol/L 141   < > 139   POTASSIUM mmol/L 4 3   < > 4 3   CHLORIDE mmol/L 108   < > 108   CO2 mmol/L 28   < > 25   BUN mg/dL 39*   < > 40*   CREATININE mg/dL 2 70*   < > 2 73*   ANION GAP mmol/L 5   < > 6   CALCIUM mg/dL 8 6   < > 8 0*   ALBUMIN g/dL  --   --  1 9*   TOTAL BILIRUBIN mg/dL  --   --  0 17*   ALK PHOS U/L  --   --  57   ALT U/L  --   --  9*   AST U/L  --   --  18   GLUCOSE RANDOM mg/dL 226*   < > 43*    < > = values in this interval not displayed  Results from last 7 days   Lab Units 05/23/22  0543   INR  1 10     Results from last 7 days   Lab Units 05/29/22  1605 05/29/22  1103 05/29/22  0555 05/28/22  2100 05/28/22  1611 05/28/22  1241 05/28/22  1116 05/28/22  0953 05/28/22  0537 05/27/22  2046 05/27/22  1606 05/27/22  1256   POC GLUCOSE mg/dl 210* 270* 290* 286* 380* 140 131 125 162* 188* 165* 176*         Results from last 7 days   Lab Units 05/27/22  0502 05/27/22  0247   LACTIC ACID mmol/L  --  0 5   PROCALCITONIN ng/ml 0 46*  --        Lines/Drains:  Invasive Devices  Report    Peripherally Inserted Central Catheter Line  Duration           PICC Line 05/24/22 Right Brachial 5 days          Drain  Duration           Continuous Bladder Irrigation Three-way 3 days    Nephrostomy Right 8 5 Fr  1 day                Central Line:  Goal for removal: Will discontinue when peripheral access obtained  Recent Cultures (last 7 days):   Results from last 7 days   Lab Units 05/28/22  1049 05/27/22  1003 05/23/22  0612   BLOOD CULTURE   --  No Growth at 48 hrs    No Growth at 48 hrs   --    GRAM STAIN RESULT  1+ Polys  No organisms seen  --   --    URINE CULTURE   --   --  No Growth <1000 cfu/mL   BODY FLUID CULTURE, STERILE  No growth  --   --        Last 24 Hours Medication List:   Current Facility-Administered Medications   Medication Dose Route Frequency Provider Last Rate    acetaminophen  650 mg Oral Q6H PRN Eden Essex, MD  belladonna-opium  30 mg Rectal Q8H PRN Eden Essex, MD      cefepime  1,000 mg Intravenous Q12H Patrick Vollaro, DO 1,000 mg (05/29/22 1033)    fentaNYL  25 mcg Intravenous Q5 Min PRN Briana Burger CRNA      HYDROmorphone  0 5 mg Intravenous Q4H PRN Eden Essex, MD      HYDROmorphone  2 mg Oral Q4H PRN Eden Essex, MD      HYDROmorphone  4 mg Oral Q4H PRN Eden Essex, MD      insulin detemir  16 Units Subcutaneous HS Nita Reardon MD      insulin lispro  1-5 Units Subcutaneous TID AC Eden Essex, MD      insulin lispro  1-5 Units Subcutaneous HS Eden Essex, MD      ondansetron  4 mg Intravenous Q6H PRN Eden Essex, MD      oxybutynin  5 mg Oral TID PRN Eden Essex, MD      pregabalin  50 mg Oral TID Eden Essex, MD      tacrolimus  1 mg Oral Q12H Eden Essex, MD      tamsulosin  0 4 mg Oral Daily With Marisa Daniel MD      temazepam  30 mg Oral HS Eden Essex, MD      zolpidem  10 mg Oral HS Eden Essex, MD          Today, Patient Was Seen By: Nita Reardon MD    **Please Note: This note may have been constructed using a voice recognition system  ** Dental

## 2023-05-02 NOTE — PATIENT INSTRUCTIONS
History of cerebral aneurysm, status post clipping:    -Ordering a MRA head with and without contrast, this is to further evaluate to see if the patient is at risk of having another aneurysm forming and to check on the aneurysm that has already been clipped  Would like for the patient to have a BUN and creatinine blood work a few days prior to having the imaging to make sure that his kidney function is intact  Patient had just recently been released from the hospital for an CARLOTTA, so I would expect his kidney function to go back to within normal limits within the next few months  History of Stroke:    I had the pleasure of seeing Sammy Hoover today in the office at [facility] [id number] [facility] in [place]  He is presenting today as a follow-up in regards to his previous strokes in the past   He is doing well at this time, not noticing very many residual stroke deficits  Having some complaints with memory which seem to be related to the strokes he had as well  However, memory has not been a concern as of late  He is doing very well and has not significantly regressed in terms of his short-term memory  He does not endorse any new strokelike symptoms at this time  States he is doing very well with many of his medications  He had to recently stop Plavix due to a procedure that was performed with nephrology to go ahead and place a stent  Patient has stopped taking Plavix for about a month now, may be a bit longer  He was never restarted on Plavix  -Going to have the patient restart on Plavix 75 mg once daily at this time for secondary stroke prevention  Since it has been about a month and a half since the had the procedure performed  He is most likely okay to continue with Plavix at this time   -Would like for the patient to have an updated lipid panel and hemoglobin A1c  Would like to make sure that the patient's Crestor 40 mg is doing enough for his cholesterol/LDL    Will let the patient know if we need to adjust this dose or add on another agent  Would like to see how the patient is doing with his current hemoglobin A1c as well  It had increased back up to 7 4% from 6 8% back in August       - For ongoing stroke prevention continue: Plavix 75 mg once daily, rosuvastatin 40 mg once daily  - Discussed the importance of antiplatelet management with the patient to prevent future strokes  - Recommend to check blood pressure occasionally away from the doctor's office to make sure that those numbers are typically less than 130/80  If they are frequently higher than that, we recommend checking a little more often and to follow up with primary care team   - Will defer to primary care team for monitoring of cholesterol panel and blood sugar numbers with target LDL cholesterol of less than 70 and hemoglobin A1c less than 7%  - Recommend following a low salt, mediterranean diet   - Recommend routine physical exercise as tolerated     We will plan for him to return to the office in 1 years time time to see on of the APPs or Dr Sun Walls but would be happy to see him sooner if the need should arise  If he has any symptoms concerning for TIA or stroke including sudden painless loss of vision or double vision, difficulty speaking or swallowing, vertigo/room spinning that does not quickly resolve, or weakness/numbness/loss of coordination affecting 1 side of the face or body he should proceed by ambulance to the nearest emergency room immediately

## 2023-05-02 NOTE — PROGRESS NOTES
Patient ID: Martha Gayle is a 74 y o  male who presents to the Su  Assessment/Plan:    History of cerebral aneurysm, status post clipping:    -Ordering a MRA head with and without contrast, this is to further evaluate to see if the patient is at risk of having another aneurysm forming and to check on the aneurysm that has already been clipped  Would like for the patient to have a BUN and creatinine blood work a few days prior to having the imaging to make sure that his kidney function is intact  Patient had just recently been released from the hospital for an CARLOTTA, so I would expect his kidney function to go back to within normal limits within the next few months  History of Stroke:    I had the pleasure of seeing Junior Rodarte today in the office at [facility] [id number] [facility] in [facility]  He is presenting today as a follow-up in regards to his previous strokes in the past   He is doing well at this time, not noticing very many residual stroke deficits  Having some complaints with memory which seem to be related to the strokes he had as well  However, memory has not been a concern as of late  He is doing very well and has not significantly regressed in terms of his short-term memory  He does not endorse any new strokelike symptoms at this time  States he is doing very well with many of his medications  He had to recently stop Plavix due to a procedure that was performed with nephrology to go ahead and place a stent  Patient has stopped taking Plavix for about a month now, may be a bit longer  He was never restarted on Plavix  -Going to have the patient restart on Plavix 75 mg once daily at this time for secondary stroke prevention  Since it has been about a month and a half since the had the procedure performed  He is most likely okay to continue with Plavix at this time   -Would like for the patient to have an updated lipid panel and hemoglobin A1c    Would like to make sure that the patient's Crestor 40 mg is doing enough for his cholesterol/LDL  Will let the patient know if we need to adjust this dose or add on another agent  Would like to see how the patient is doing with his current hemoglobin A1c as well  It had increased back up to 7 4% from 6 8% back in August       - For ongoing stroke prevention continue: Plavix 75 mg once daily, rosuvastatin 40 mg once daily  - Discussed the importance of antiplatelet management with the patient to prevent future strokes  - Recommend to check blood pressure occasionally away from the doctor's office to make sure that those numbers are typically less than 130/80  If they are frequently higher than that, we recommend checking a little more often and to follow up with primary care team   - Will defer to primary care team for monitoring of cholesterol panel and blood sugar numbers with target LDL cholesterol of less than 70 and hemoglobin A1c less than 7%  - Recommend following a low salt, mediterranean diet   - Recommend routine physical exercise as tolerated     We will plan for him to return to the office in 1 years time time to see on of the APPs or Dr Pradeep Bar but would be happy to see him sooner if the need should arise  If he has any symptoms concerning for TIA or stroke including sudden painless loss of vision or double vision, difficulty speaking or swallowing, vertigo/room spinning that does not quickly resolve, or weakness/numbness/loss of coordination affecting 1 side of the face or body he should proceed by ambulance to the nearest emergency room immediately  Subjective:    HPI    For Review:    Higinio Doherty is a 74 y o  male who presents for follow up in regard to his previous history of stroke  Patient has a significant past medical history of liver transplant on chronic immunosuppression, hypertension, hyperlipidemia, diabetes mellitus with neuropathy, CKD    He has a history of left lentiform nucleus ischemic stroke in September 2017, started on aspirin and statin at home  In December 2018, he was admitted for altered mental status, headache, vomiting and fever  MRI brain showed an acute to early subacute punctate infarction in the left thalamus  MRA head demonstrated multifocal mild to moderate narrowing in the bilateral posterior cerebral arteries, suggestive of atherosclerotic disease  No significant stenosis in the basilar artery  MRA carotids demonstrated no evidence of hemodynamically significant stenosis or occlusion of the carotid arteries  Absence of flow related signal in the right vertebral artery consistent with previous findings of neck and only hypoplastic or atretic vessel  Dominant left vertebral artery  Patient was initially discharged on Plavix 75 mg once daily and Lipitor 40 mg once daily  Patient did develop lower rash with Lipitor, started on Crestor 20 mg daily  When the patient presented back to the office in May 2021, had complaints of memory difficulty and dizziness  Patient describes intermittent events of room spinning sensation that lasted for a week, triggered by normally changing positions, and no nausea/no vomiting  There were no signs of BPPV on exam     Patient's MoCA score was 9 out of 30 with 0 out of 5 words recalled  Appears that the patient's cognitive dysfunction was likely multifactorial, including English not being his primary language, for a remote history of alcohol consumption as well as multiple strokes  At that time, he was reporting noncompliance with Plavix  Patient had MRI brain with [facility] on 6/6/21  This demonstrated mild atrophy, old L PCA infarct, microangiopathy  [facility] analysis was performed: Volumetric analysis limited due to old left PCA distribution infarct with encephalomalacia in the mesial left temporal lobe and ex vacuo dilatation of the temporal horn of the left lateral ventricle  MRA head 6/6/21   Status post post clipping of left anterior choroidal aneurysm   No new or recurrent aneurysm  New mild/moderate stenosis in the cavernous segment of the right internal carotid artery   New moderate stenosis in the proximal basilar artery  Stable absence of flow related signal in the right vertebral artery  Stable atherosclerotic disease in the posterior cerebral arteries, right greater than left  Interval History:    Patient presents with his wife for today's appointment  Patient is currently following with urology and hematology oncology, patient has urinary bladder cancer and was at the hospital recently for an CARLOTTA  Patient had stated that since this procedure to place the nephrostomy tube, he had since discontinued on Plavix and had not been restarted  Has not taking Plavix and approximately 1 and half months  Educated the patient on the fact that it is important to be on Plavix at this time, given his past history of stroke and and it will help decrease his risk of having another stroke in the future  No residual stroke symptoms, no new stroke like symptoms at this time  Memory is stable, has stayed about the same at this time  Had some short term memory deficits after his most recent stroke  In terms of short term memory, seems to be improving  BP: 130/60, not on any blood pressure medications now  Blood pressure cuff at home he states advised the patient to check his blood pressure every few days  ,  Still taking Rosuvastatin, will be restarting Plavix 75 mg once daily at this time  Most recent LDL in February 2022, was 42 mg/dL  Most recent hemoglobin A1c was 7 4%  Patient is currently on insulin for treatment of his diabetes  Following with his primary care very closely regarding the treatment at this time  Patient reports that he is eating relatively healthy, trying to stay away from things such as carbs and processed foods    He has not been as physically active at this time due to the recent winter season, he is "working on trying to get outside more  Patient is sleeping well at night, no current issues with sleep apnea he reports  Patient does not feel that he is significantly depressed or anxious either      Lab Results   Component Value Date/Time    CHOLESTEROL 117 02/10/2022 10:27 AM     Lab Results   Component Value Date/Time    TRIG 220 (H) 02/10/2022 10:27 AM    TRIG 141 06/17/2015 10:52 AM     Lab Results   Component Value Date/Time    HDL 31 (L) 02/10/2022 10:27 AM    HDL 57 06/17/2015 10:52 AM     Lab Results   Component Value Date/Time    LDLCALC 42 02/10/2022 10:27 AM    LDLCALC 67 06/17/2015 10:52 AM       Lab Results   Component Value Date/Time    HGBA1C 7 4 (A) 12/06/2022 11:07 AM    HGBA1C 6 8 (H) 08/19/2022 11:24 AM    HGBA1C 7 0 (H) 12/23/2015 01:51 PM     Lab Results   Component Value Date/Time     08/19/2022 11:24 AM     12/23/2015 01:51 PM           Past Medical History:   Diagnosis Date    Acute urinary retention 09/12/2022    Alcoholism ([address] )     \"Sober over 21 years\"    Anemia     Arthritis     Back pain     Bladder cancer ([address] )     Bruises easily     Cerebral artery aneurysm     Change in mental state     last assessed 5/18/15; resolved 10/27/15    Chronic kidney disease     COVID-19 2022 not hospitalized fully recovered    Diabetes mellitus ([address] )     Difficult intravenous access     Drug dependence ([address] )     \"in the past--40yrs ago per sig other\"    Fatigue     last assessed 1/26/15; resolved 5/24/16    Full dentures     Hepatitis C     \"had treatment\"    History of cancer chemotherapy     History of radiation therapy     History of transfusion     per sig other \"had blood transfusion when anemic from urinary bleeding\"   [facility]    Hx of liver transplant ([address] )     Hydronephrosis of right kidney     Insomnia     \"after liver transplant\"    Kidney disease     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[address] )     Liver cirrhosis ([address] )  " "   Liver transplant recipient [facility]     [facility]    Lung cancer [facility])      Hematology/oncology Dr Jack Myers in place     Renal disorder     Shortness of breath     \"when moving around-started on inhalers\"    [facility]     Stroke [facility]     diff short term memory    Subdural hygroma     2/27/14; resolved 7/28/15    Thrombocytopenia ([facility] ) 09/20/2017    Uses Lao as primary spoken language        Current Outpatient Medications:     albuterol (Ventolin HFA) 90 mcg/act inhaler, Inhale 2 puffs every 6 (six) hours as needed for wheezing or shortness of breath, Disp: 18 g, Rfl: 5    Blood Glucose Calibration (OT ULTRA/FASTTK CNTRL SOLN) SOLN, USE AS DIRECTED, Disp: 1 each, Rfl: 0    Comfort EZ Pen Needles 32G X 4 MM MISC, USE 3-4 AS DIRECTED, Disp: 100 each, Rfl: 5    Continuous Blood Gluc  (FreeStyle Frank 14 Day [place]) NATALIA, Use 1 Device continuous, Disp: 1 each, Rfl: 0    Incontinence Supply Disposable (Incontinence Brief Medium) MISC, Use 4 (four) times a day, Disp: 120 each, Rfl: 0    insulin detemir (Levemir FlexTouch) 100 Units/mL injection pen, Inject 15 Units under the skin daily at bedtime, Disp: 15 mL, Rfl: 3    Lancet Devices (Lancing Device) MISC, USE AS DIRECTED, Disp: 1 each, Rfl: 0    loperamide (IMODIUM) 2 mg capsule, Take 1 capsule (2 mg total) by mouth 2 (two) times a day as needed for diarrhea [place] carmen tableta dos veces por cynthia si tiene diarrea, Disp: 90 capsule, Rfl: 0    Multiple Vitamin (multivitamin) tablet, Take 1 tablet by mouth daily, Disp: , Rfl:     OneTouch Ultra test strip, TEST 3-4 TIMES A DAY, Disp: 100 each, Rfl: 4    oxybutynin (DITROPAN) 5 mg tablet, Take 1 tablet (5 mg total) by mouth 2 (two) times a day as needed (bladder spasms), Disp: 60 tablet, Rfl: 0    pregabalin (LYRICA) 75 mg capsule, Take 1 capsule (75 mg total) by mouth 2 (two) times a day, Disp: 60 capsule, Rfl: 5    rosuvastatin (CRESTOR) 40 MG tablet, " TAKE ONE (1) TABLET BY MOUTH DAILY, Disp: 30 tablet, Rfl: 5    tacrolimus (PROGRAF) 1 mg capsule, TAKE 1 CAPSULE (1 MG TOTAL) BY MOUTH EVERY 12 (TWELVE) HOURS, Disp: 180 capsule, Rfl: 3    tamsulosin (FLOMAX) 0 4 mg, Take 1 capsule (0 4 mg total) by mouth daily with dinner, Disp: 90 capsule, Rfl: 3    temazepam (RESTORIL) 7 5 mg capsule, TAKE 2 CAPSULES (15 MG TOTAL) BY MOUTH DAILY AT BEDTIME AS NEEDED FOR SLEEP, Disp: 30 capsule, Rfl: 3    zolpidem (AMBIEN) 5 mg tablet, Take 1 tablet (5 mg total) by mouth daily at bedtime as needed for sleep, Disp: 30 tablet, Rfl: 3     Objective:    Physical Exam:                                                                 Vitals:            Constitutional:    /60 (BP Location: Right arm, Patient Position: Sitting, Cuff Size: Standard)   Pulse 66   Temp (!) 97 2 °F (36 2 °C) (Temporal)   Resp 14   Wt 56 9 kg (125 lb 6 4 oz)   SpO2 100%   BMI 22 94 kg/m²   BP Readings from Last 3 Encounters:   05/02/23 130/60   04/30/23 115/60   04/12/23 110/58     Pulse Readings from Last 3 Encounters:   05/02/23 66   04/30/23 64   04/12/23 79         Well developed, well nourished, well groomed  No dysmorphic features  Psychiatric:  Normal behavior and appropriate affect        Neurological Examination:     Mental status/cognitive function:   Orientated to time, place and person  Recent and remote memory intact  Attention span and concentration as well as fund of knowledge are appropriate for age  Normal language and spontaneous speech  Cranial Nerves:  II-visual fields full  III, IV, VI-Pupils were equal, round, and reactive to light and accomodation  Extraocular movements were full and conjugate without nystagmus  Conjugate gaze, normal smooth pursuits, normal saccades   V-facial sensation symmetric      VII-facial expression symmetric, intact forehead wrinkle, strong eye closure, symmetric smile    VIII-hearing grossly intact bilaterally   IX, X-palate elevation symmetric, no dysarthria  XI-shoulder shrug strength intact    XII-tongue protrusion midline  Motor Exam: symmetric bulk and tone throughout, no pronator drift  Power/strength 5/5 bilateral upper and lower extremities, no atrophy, fasciculations or abnormal movements noted  Sensory: grossly intact light touch in all extremities  Reflexes: brachioradialis 2+, biceps 2+, knee 2+ bilaterally  Coordination: Finger nose finger intact bilaterally, no apparent dysmetria, ataxia or tremor noted  Gait: steady casual and tandem gait  ROS:  Review of Systems   Constitutional: Negative  Negative for appetite change and fever  HENT: Negative  Negative for hearing loss, tinnitus, trouble swallowing and voice change  Eyes: Negative  Negative for photophobia, pain and visual disturbance  Respiratory: Negative  Negative for shortness of breath  Cardiovascular: Negative  Negative for palpitations  Gastrointestinal: Negative  Negative for nausea and vomiting  Endocrine: Negative  Negative for cold intolerance  Genitourinary: Negative  Negative for dysuria, frequency and urgency  Musculoskeletal: Negative  Negative for gait problem, myalgias and neck pain  Skin: Negative  Negative for rash  Allergic/Immunologic: Negative  Neurological: Negative  Negative for dizziness, tremors, seizures, syncope, facial asymmetry, speech difficulty, weakness, light-headedness, numbness and headaches  Hematological: Negative  Does not bruise/bleed easily  Psychiatric/Behavioral: Negative  Negative for confusion, hallucinations and sleep disturbance            I have spent 30 minutes today on this case including chart review, performing history and exam, patient counseling, and documentation/communication        Fallon Veronica PA-C  05/02/2023

## 2023-05-03 ENCOUNTER — HOSPITAL ENCOUNTER (OUTPATIENT)
Dept: INFUSION CENTER | Facility: HOSPITAL | Age: 73
Discharge: HOME | End: 2023-05-03

## 2023-05-03 DIAGNOSIS — I67.1 CEREBRAL ANEURYSM: ICD-10-CM

## 2023-05-03 DIAGNOSIS — Z95.828 PORT-A-CATH IN PLACE: ICD-10-CM

## 2023-05-03 DIAGNOSIS — E11.51 TYPE 2 DIABETES MELLITUS WITH DIABETIC PERIPHERAL ANGIOPATHY WITHOUT GANGRENE, WITH LONG-TERM CURRENT USE OF INSULIN (HCC): ICD-10-CM

## 2023-05-03 DIAGNOSIS — N17.9 ACUTE KIDNEY INJURY (HCC): Primary | ICD-10-CM

## 2023-05-03 DIAGNOSIS — Z79.4 TYPE 2 DIABETES MELLITUS WITH DIABETIC PERIPHERAL ANGIOPATHY WITHOUT GANGRENE, WITH LONG-TERM CURRENT USE OF INSULIN (HCC): ICD-10-CM

## 2023-05-03 DIAGNOSIS — Z86.73 HISTORY OF CVA (CEREBROVASCULAR ACCIDENT): Chronic | ICD-10-CM

## 2023-05-03 LAB
ANION GAP SERPL CALCULATED.3IONS-SCNC: 5 MMOL/L (ref 4–13)
BUN SERPL-MCNC: 45 MG/DL (ref 5–25)
CALCIUM SERPL-MCNC: 8 MG/DL (ref 8.3–10.1)
CHLORIDE SERPL-SCNC: 113 MMOL/L (ref 96–108)
CHOLEST SERPL-MCNC: 97 MG/DL
CO2 SERPL-SCNC: 21 MMOL/L (ref 21–32)
CREAT SERPL-MCNC: 3.14 MG/DL (ref 0.6–1.3)
GFR SERPL CREATININE-BSD FRML MDRD: 18 ML/MIN/1.73SQ M
GLUCOSE P FAST SERPL-MCNC: 93 MG/DL (ref 65–99)
GLUCOSE SERPL-MCNC: 93 MG/DL (ref 65–140)
HDLC SERPL-MCNC: 49 MG/DL
LDLC SERPL CALC-MCNC: 31 MG/DL (ref 0–100)
POTASSIUM SERPL-SCNC: 4.5 MMOL/L (ref 3.5–5.3)
SODIUM SERPL-SCNC: 139 MMOL/L (ref 135–147)
TRIGL SERPL-MCNC: 85 MG/DL

## 2023-05-04 ENCOUNTER — OFFICE VISIT (OUTPATIENT)
Dept: FAMILY MEDICINE CLINIC | Facility: CLINIC | Age: 73
End: 2023-05-04

## 2023-05-04 ENCOUNTER — HOME CARE VISIT (OUTPATIENT)
Dept: HOME HEALTH SERVICES | Facility: HOME HEALTHCARE | Age: 73
End: 2023-05-04

## 2023-05-04 VITALS
BODY MASS INDEX: 22.86 KG/M2 | HEART RATE: 70 BPM | HEIGHT: 62 IN | WEIGHT: 124.2 LBS | OXYGEN SATURATION: 99 % | TEMPERATURE: 96.1 F | SYSTOLIC BLOOD PRESSURE: 120 MMHG | DIASTOLIC BLOOD PRESSURE: 62 MMHG | RESPIRATION RATE: 16 BRPM

## 2023-05-04 DIAGNOSIS — C67.9 BLADDER CANCER METASTASIZED TO LUNG (HCC): Primary | ICD-10-CM

## 2023-05-04 DIAGNOSIS — E11.51 TYPE 2 DIABETES MELLITUS WITH DIABETIC PERIPHERAL ANGIOPATHY WITHOUT GANGRENE, WITH LONG-TERM CURRENT USE OF INSULIN (HCC): ICD-10-CM

## 2023-05-04 DIAGNOSIS — Z79.4 TYPE 2 DIABETES MELLITUS WITH DIABETIC PERIPHERAL ANGIOPATHY WITHOUT GANGRENE, WITH LONG-TERM CURRENT USE OF INSULIN (HCC): ICD-10-CM

## 2023-05-04 DIAGNOSIS — N17.9 ACUTE RENAL FAILURE SUPERIMPOSED ON STAGE 3B CHRONIC KIDNEY DISEASE, UNSPECIFIED ACUTE RENAL FAILURE TYPE (HCC): ICD-10-CM

## 2023-05-04 DIAGNOSIS — N13.30 HYDRONEPHROSIS OF RIGHT KIDNEY: ICD-10-CM

## 2023-05-04 DIAGNOSIS — N18.4 CKD (CHRONIC KIDNEY DISEASE) STAGE 4, GFR 15-29 ML/MIN (HCC): Chronic | ICD-10-CM

## 2023-05-04 DIAGNOSIS — C78.00 BLADDER CANCER METASTASIZED TO LUNG (HCC): Primary | ICD-10-CM

## 2023-05-04 DIAGNOSIS — N18.32 ACUTE RENAL FAILURE SUPERIMPOSED ON STAGE 3B CHRONIC KIDNEY DISEASE, UNSPECIFIED ACUTE RENAL FAILURE TYPE (HCC): ICD-10-CM

## 2023-05-04 DIAGNOSIS — Z09 HOSPITAL DISCHARGE FOLLOW-UP: ICD-10-CM

## 2023-05-04 NOTE — ASSESSMENT & PLAN NOTE
Long discussion with pt and his wife  Refer to hospice at this point  He is tired of hospitalization and prefers not to go back

## 2023-05-04 NOTE — PROGRESS NOTES
Diabetic Foot Exam    Patient's shoes and socks removed  Right Foot/Ankle   Right Foot Inspection  Skin Exam: dry skin and abnormal color  Toe Exam: ROM and strength within normal limits  Sensory   Monofilament testing: diminished    Vascular  The right DP pulse is 2+  The right PT pulse is 2+  Left Foot/Ankle  Left Foot Inspection  Skin Exam: dry skin and abnormal color  Toe Exam: ROM and strength within normal limits  Sensory   Monofilament testing: intact    Vascular  The left DP pulse is 2+  The left PT pulse is 2+  Assign Risk Category  No deformity present  No loss of protective sensation  No weak pulses  Risk: 0        Assessment/Plan:    1  Bladder cancer 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 [facility])  -     Ambulatory Referral to Hospice; Future    2  Type 2 diabetes mellitus with diabetic peripheral angiopathy without gangrene, with long-term current use of insulin (HCC)      Depression Screening and Follow-up Plan: Patient was screened for depression during today's encounter  They screened negative with a PHQ-2 score of 0  There are no Patient Instructions on file for this visit  No follow-ups on file  Subjective:      Patient ID: Cece Scott is a 74 y o  male  No chief complaint on file        Here with wife  [facility]   Was admitted with creatinine of 4    Acute on chronic kidney  Creatinine has stayed at 3- baseline  Has repeat labs reviewed  Long discussion with family regarding hospice  Will contact again  Urology has US and stent placement  Eating well at home  Per pt doesn't wish any further hospital stays    TCM Call     Date and time call was made  5/1/2023 10:47 AM    Hospital care reviewed  Records not available    Patient was hospitialized at  [facility]    Date of Admission  04/27/23    Date of discharge  04/30/23    Diagnosis  Acute renal failure superimposed on stage 3 chronic kidney disease    Disposition  Home    Were the patients medications reviewed and updated  No    Current Symptoms  None    Pain with urination severity  Mild    Pain with urination  Gradual      TCM Call     Post hospital issues  None    Should patient be enrolled in anticoag monitoring? No    Scheduled for follow up? Yes    Did you obtain your prescribed medications  Yes    Do you need help managing your prescriptions or medications  No    Is transportation to your appointment needed  No    I have advised the patient to call PCP with any new or worsening symptoms  MR Jens    Living Arrangements  Family members    Support System  None    Are you recieving any outpatient services  No    Are you recieving home care services  No    Types of home care services  Nurse visit    Are you using any community resources  No    Current waiver services  No    Have you fallen in the last 12 months  Yes    How many times  2    Interperter language line needed  No    Counseling  Patient        The following portions of the patient's history were reviewed and updated as appropriate: allergies, current medications, past family history, past medical history, past social history, past surgical history and problem list     Review of Systems   Constitutional: Positive for fatigue  HENT: Negative  Eyes: Negative  Respiratory: Negative  Cardiovascular: Negative  Gastrointestinal: Negative  Endocrine: Negative  Genitourinary: Negative  Musculoskeletal: Negative  Skin: Negative  Allergic/Immunologic: Negative  Neurological: Negative  Hematological: Negative  Psychiatric/Behavioral: Negative            Current Outpatient Medications   Medication Sig Dispense Refill    albuterol (Ventolin HFA) 90 mcg/act inhaler Inhale 2 puffs every 6 (six) hours as needed for wheezing or shortness of breath 18 g 5    Blood Glucose Calibration (OT ULTRA/FASTTK CNTRL SOLN) SOLN USE AS DIRECTED 1 each 0    clopidogrel (Plavix) 75 mg tablet Take 1 tablet (75 mg total) by mouth daily 30 tablet 3    Comfort EZ Pen Needles 32G X 4 MM MISC USE 3-4 AS DIRECTED 100 each 5    Continuous Blood Gluc  (FreeStyle Frank 14 Day Orange) NATALIA Use 1 Device continuous 1 each 0    Incontinence Supply Disposable (Incontinence Brief Medium) MISC Use 4 (four) times a day 120 each 0    insulin detemir (Levemir FlexTouch) 100 Units/mL injection pen Inject 15 Units under the skin daily at bedtime 15 mL 3    Lancet Devices (Lancing Device) MISC USE AS DIRECTED 1 each 0    loperamide (IMODIUM) 2 mg capsule Take 1 capsule (2 mg total) by mouth 2 (two) times a day as needed for diarrhea [place] carmen tableta dos veces por cynthia si tiene diarrea 90 capsule 0    OneTouch Ultra test strip TEST 3-4 TIMES A  each 4    oxybutynin (DITROPAN) 5 mg tablet Take 1 tablet (5 mg total) by mouth 2 (two) times a day as needed (bladder spasms) 60 tablet 0    pregabalin (LYRICA) 75 mg capsule Take 1 capsule (75 mg total) by mouth 2 (two) times a day 60 capsule 5    rosuvastatin (CRESTOR) 40 MG tablet TAKE ONE (1) TABLET BY MOUTH DAILY 30 tablet 5    tacrolimus (PROGRAF) 1 mg capsule TAKE 1 CAPSULE (1 MG TOTAL) BY MOUTH EVERY 12 (TWELVE) HOURS 180 capsule 3    tamsulosin (FLOMAX) 0 4 mg Take 1 capsule (0 4 mg total) by mouth daily with dinner 90 capsule 3    temazepam (RESTORIL) 7 5 mg capsule TAKE 2 CAPSULES (15 MG TOTAL) BY MOUTH DAILY AT BEDTIME AS NEEDED FOR SLEEP 30 capsule 3    zolpidem (AMBIEN) 5 mg tablet Take 1 tablet (5 mg total) by mouth daily at bedtime as needed for sleep 30 tablet 3    Multiple Vitamin (multivitamin) tablet Take 1 tablet by mouth daily (Patient not taking: Reported on 5/4/2023)       No current facility-administered medications for this visit       Facility-Administered Medications Ordered in Other Visits   Medication Dose Route Frequency Provider Last Rate Last Admin    alteplase (CATHFLO) injection 2 mg  2 mg [facility] PRYEN Rios MD "      Objective:    /62 (BP Location: Left arm, Patient Position: Sitting, Cuff Size: Adult)   Pulse 70   Temp (!) 96 1 °F (35 6 °C)   Resp 16   Ht 5' 2\" (1 575 m)   Wt 56 3 kg (124 lb 3 2 oz)   SpO2 99%   BMI 22 72 kg/m²        Physical Exam  Vitals and nursing note reviewed  Constitutional:       Appearance: Normal appearance  He is well-developed  HENT:      Head: Normocephalic and atraumatic  Right Ear: External ear normal       Left Ear: External ear normal       Nose: Nose normal    Eyes:      General: Lids are normal       Conjunctiva/sclera: Conjunctivae normal       Pupils: Pupils are equal, round, and reactive to light  Cardiovascular:      Rate and Rhythm: Normal rate and regular rhythm  Pulses: Normal pulses  no weak pulses          Dorsalis pedis pulses are 2+ on the right side and 2+ on the left side  Posterior tibial pulses are 2+ on the right side and 2+ on the left side  Heart sounds: Normal heart sounds, S1 normal and S2 normal    Pulmonary:      Effort: Pulmonary effort is normal       Breath sounds: Normal breath sounds  Abdominal:      General: Bowel sounds are normal       Palpations: Abdomen is soft  Musculoskeletal:         General: Normal range of motion  Cervical back: Normal range of motion and neck supple  Feet:    Feet:      Right foot:      Skin integrity: Dry skin present  Left foot:      Skin integrity: Dry skin present  Skin:     General: Skin is warm and dry  Neurological:      Mental Status: He is alert and oriented to person, place, and time  Deep Tendon Reflexes: Reflexes are normal and symmetric  Psychiatric:         Speech: Speech normal          Behavior: Behavior normal          Thought Content:  Thought content normal          Judgment: Judgment normal                 Jeremy Piety, DO  "

## 2023-05-04 NOTE — ASSESSMENT & PLAN NOTE
Lab Results   Component Value Date    EGFR 18 05/03/2023    EGFR 17 04/30/2023    EGFR 16 04/29/2023    CREATININE 3 14 (H) 05/03/2023    CREATININE 3 28 (H) 04/30/2023    CREATININE 3 35 (H) 04/29/2023   baseline now at 3

## 2023-05-05 ENCOUNTER — TELEPHONE (OUTPATIENT)
Dept: NEUROLOGY | Facility: CLINIC | Age: 73
End: 2023-05-05

## 2023-05-05 LAB
EST. AVERAGE GLUCOSE BLD GHB EST-MCNC: 163 MG/DL
HBA1C MFR BLD: 7.3 %

## 2023-05-05 NOTE — TELEPHONE ENCOUNTER
----- Message from Don Rasmussen PA-C sent at 5/5/2023  8:01 AM EDT -----  Hello,    If someone could please notify the patient  After talking with the patient's PCP, who is made aware to me that the patient's Plavix was discontinued not due to a urology procedure that he had about a month and a half ago but it was discontinued due to his decreased platelet count  After talking extensively with her for messaging, was decided that the patient is at much higher risk of bleeding out due to his significantly low platelet count rather than having a stroke at this time  I believe that it is in the patient's best interest to discontinue the Plavix due to the fact this could cause more harm than good to him at this time  Please if we could relay this message as soon as possible    Thank you!    -Candace Gallego

## 2023-05-09 NOTE — TELEPHONE ENCOUNTER
Phone call to pt with the assistance of the TEA Hester  Pt is agreeable to discontinuing Plavix  Pt voiced understanding

## 2023-05-10 ENCOUNTER — TELEPHONE (OUTPATIENT)
Dept: HEMATOLOGY ONCOLOGY | Facility: CLINIC | Age: 73
End: 2023-05-10

## 2023-05-10 NOTE — TELEPHONE ENCOUNTER
Patient Call    Who are you speaking with? Significant Other    If it is not the patient, are they listed on an active communication consent form? Yes   What is the reason for this call? Patient's significant other, Aliya Acevedo, calling in requesting a call back from [facility]  Does this require a call back? Yes   If a call back is required, please list best call back number [phone]   If a call back is required, advise that a message will be forwarded to their care team and someone will return their call as soon as possible  Did you relay this information to the patient?  Yes

## 2023-05-11 ENCOUNTER — PATIENT OUTREACH (OUTPATIENT)
Dept: CASE MANAGEMENT | Facility: HOSPITAL | Age: 73
End: 2023-05-11

## 2023-05-11 NOTE — PROGRESS NOTES
"OSW called Marykay Saint today  She stated there was a \"mix up\" with Rogers's PCP and hospice was referred  She stated Suzy Dunlap is not interested in hospice and is not having any pain  OSW attempted to provide some additional information about hospice cares, but she stated he wants to continue to see his doctors and continue to take all of his medications  Marykay Saint stated Suzy Dunlap has become increasingly agitated, difficult to please and having more anger towards her and their daughter Ezequiel Klinefelter  She is asking if counseling resources can be offered  OSW explained this writer can research any in-network therapists that are [place] speaking and follow up with her  She is not sure Suzy Dunlap will be open to counseling, but she has started to talk to him about it and would like to present him some support  Provided active and supportive listening  Will f/u next week with resources    "

## 2023-05-15 ENCOUNTER — HOSPITAL ENCOUNTER (OUTPATIENT)
Dept: RADIOLOGY | Facility: HOSPITAL | Age: 73
Discharge: HOME | End: 2023-05-15
Attending: UROLOGY

## 2023-05-15 DIAGNOSIS — N35.913 STRICTURE OF MEMBRANOUS URETHRA IN MALE, UNSPECIFIED STRICTURE TYPE: ICD-10-CM

## 2023-05-16 DIAGNOSIS — Z79.4 CONTROLLED TYPE 2 DIABETES MELLITUS WITH DIABETIC NEUROPATHY, WITH LONG-TERM CURRENT USE OF INSULIN (HCC): ICD-10-CM

## 2023-05-16 DIAGNOSIS — E11.40 CONTROLLED TYPE 2 DIABETES MELLITUS WITH DIABETIC NEUROPATHY, WITH LONG-TERM CURRENT USE OF INSULIN (HCC): ICD-10-CM

## 2023-05-16 RX ORDER — BLOOD-GLUCOSE CONTROL, NORMAL
EACH MISCELLANEOUS
Qty: 1 EACH | Refills: 0 | Status: SHIPPED | OUTPATIENT
Start: 2023-05-16

## 2023-05-18 ENCOUNTER — OFFICE VISIT (OUTPATIENT)
Dept: NEPHROLOGY | Facility: CLINIC | Age: 73
End: 2023-05-18

## 2023-05-18 ENCOUNTER — PATIENT OUTREACH (OUTPATIENT)
Dept: CASE MANAGEMENT | Facility: HOSPITAL | Age: 73
End: 2023-05-18

## 2023-05-18 DIAGNOSIS — N13.39 OTHER HYDRONEPHROSIS: Primary | Chronic | ICD-10-CM

## 2023-05-18 DIAGNOSIS — N18.9 CHRONIC KIDNEY DISEASE, UNSPECIFIED CKD STAGE: ICD-10-CM

## 2023-05-18 PROBLEM — E87.20 METABOLIC ACIDOSIS: Status: RESOLVED | Noted: 2022-01-01 | Resolved: 2023-01-01

## 2023-05-18 PROBLEM — N18.4 CKD (CHRONIC KIDNEY DISEASE) STAGE 4, GFR 15-29 ML/MIN (HCC): Chronic | Status: RESOLVED | Noted: 2021-03-30 | Resolved: 2023-05-18

## 2023-05-18 PROBLEM — N18.30 ACUTE RENAL FAILURE SUPERIMPOSED ON STAGE 3 CHRONIC KIDNEY DISEASE (HCC): Status: RESOLVED | Noted: 2018-12-11 | Resolved: 2023-05-18

## 2023-05-18 PROBLEM — N18.30 ACUTE RENAL FAILURE SUPERIMPOSED ON STAGE 3 CHRONIC KIDNEY DISEASE (HCC): Status: RESOLVED | Noted: 2018-12-11 | Resolved: 2023-01-01

## 2023-05-18 PROBLEM — N18.4 CKD (CHRONIC KIDNEY DISEASE) STAGE 4, GFR 15-29 ML/MIN (HCC): Chronic | Status: RESOLVED | Noted: 2021-03-30 | Resolved: 2023-01-01

## 2023-05-18 PROBLEM — N17.9 ACUTE RENAL FAILURE SUPERIMPOSED ON STAGE 3 CHRONIC KIDNEY DISEASE (HCC): Status: RESOLVED | Noted: 2018-12-11 | Resolved: 2023-01-01

## 2023-05-18 PROBLEM — Z87.448 HISTORY OF HYDRONEPHROSIS: Status: RESOLVED | Noted: 2022-10-08 | Resolved: 2023-01-01

## 2023-05-18 PROBLEM — E87.20 METABOLIC ACIDOSIS: Status: RESOLVED | Noted: 2022-12-02 | Resolved: 2023-05-18

## 2023-05-18 PROBLEM — Z87.448 HISTORY OF HYDRONEPHROSIS: Status: RESOLVED | Noted: 2022-10-08 | Resolved: 2023-05-18

## 2023-05-18 PROBLEM — N17.9 ACUTE RENAL FAILURE SUPERIMPOSED ON STAGE 3 CHRONIC KIDNEY DISEASE (HCC): Status: RESOLVED | Noted: 2018-12-11 | Resolved: 2023-05-18

## 2023-05-18 NOTE — PATIENT INSTRUCTIONS
You are here for follow-up I think you look great you are feeling me in on the events that have happened over the last couple months  Your creatinine which is the blood test for the kidney function is better than when you left the hospital at 3 1  In my assessment looking at this the right kidney had been chronically blocked and you have a stent in there but it looks like it has shrunken been damaged some most of the works being done by the left kidney  Regardless of this the numbers better than when you went in the hospital but is not quite back to the mid twos but it may continue to improve so we will monitor this monthly  No changes in medications  I will call you with lab results come in    Please call me if you have any questions or problems before next visit

## 2023-05-18 NOTE — PROGRESS NOTES
NEPHROLOGY PROGRESS NOTE    Cecille Portillo 74 y o  male MRN: [id number]  Unit/Bed#:  Encounter: [id number]  Reason for Consult: Chronic kidney disease    Patient is here for follow-up with his wife he really looks good says he is eating well and has good energy  He was admitted to the hospital in late April of this year as his creatinine had increased to 4  Couple weeks prior to that he had a right ureteral stent placement  He has no complaints today  He did inform me that he has some cancer spread to his lungs  ASSESSMENT/PLAN:  1  Renal    The patient has chronic kidney disease his creatinine has been running around 2 5 and has been monitored for years on his immunosuppression for his liver transplant  He then was discovered to have urinary obstruction and a bladder tumor and by this point his right kidney despite having a stent is atrophic and likely not contributing much to his overall kidney function  Repeat ultrasound did not reveal hydronephrosis and urology monitors  His creatinine is down to 3 1 since discharge from the hospital so unsure if this will be his clear baseline therefore we will continue to improve slowly back to his prior baseline of 2 5  Blood pressure is under good control he has no volume overload no urinary complaints  We will be checking BMP monthly  Follow-up in September as scheduled  He is told to call if there is any problems or concerns before next  2  Metastatic bladder cancer    Patient had completed radiation chemotherapy and surgery for resection of the mass  Apparently now he has some metastatic disease in the lung  Ongoing to reach out to oncology to see if there is any other treatment planned or not  SUBJECTIVE:  Review of Systems   Constitutional: Negative for chills, decreased appetite, fever and malaise/fatigue  HENT: Negative  Eyes: Negative  Cardiovascular: Negative  Negative for chest pain, dyspnea on exertion, leg swelling and orthopnea  Respiratory: Negative  Negative for cough, shortness of breath, sputum production and wheezing  Gastrointestinal: Negative  Negative for abdominal pain, diarrhea, nausea and vomiting  Genitourinary: Negative for dysuria, flank pain, hematuria and incomplete emptying  Neurological: Negative for dizziness, focal weakness, headaches and weakness  Psychiatric/Behavioral: Negative for altered mental status, hallucinations and hypervigilance  The patient is not nervous/anxious  OBJECTIVE:  Current Weight:    Binta@[employer] com:     There were no vitals taken for this visit  , There is no height or weight on file to calculate BMI  [unfilled]    Physical Exam: There were no vitals taken for this visit  Physical Exam  Constitutional:       General: He is not in acute distress  Appearance: He is not toxic-appearing or diaphoretic  HENT:      Head: Normocephalic and atraumatic  Nose: Nose normal       Mouth/Throat:      Mouth: Mucous membranes are moist    Eyes:      General: No scleral icterus  Extraocular Movements: Extraocular movements intact  Cardiovascular:      Rate and Rhythm: Normal rate and regular rhythm  Heart sounds: No friction rub  No gallop  Pulmonary:      Effort: Pulmonary effort is normal  No respiratory distress  Breath sounds: No wheezing, rhonchi or rales  Abdominal:      General: Bowel sounds are normal  There is no distension  Palpations: Abdomen is soft  Tenderness: There is no abdominal tenderness  There is no rebound  Musculoskeletal:      Cervical back: Normal range of motion and neck supple  Neurological:      General: No focal deficit present  Mental Status: He is alert and oriented to person, place, and time  Mental status is at baseline  Psychiatric:         Mood and Affect: Mood normal          Behavior: Behavior normal          Thought Content:  Thought content normal          Judgment: Judgment normal  Medications:    Current Outpatient Medications:   •  albuterol (Ventolin HFA) 90 mcg/act inhaler, Inhale 2 puffs every 6 (six) hours as needed for wheezing or shortness of breath, Disp: 18 g, Rfl: 5  •  Blood Glucose Calibration (OT ULTRA/FASTTK CNTRL SOLN) SOLN, USE AS DIRECTED, Disp: 1 each, Rfl: 0  •  Comfort EZ Pen Needles 32G X 4 MM MISC, USE 3-4 AS DIRECTED, Disp: 100 each, Rfl: 5  •  Continuous Blood Gluc  (FreeStyle Frank 14 Day [place]) NATALIA, Use 1 Device continuous, Disp: 1 each, Rfl: 0  •  insulin detemir (Levemir FlexTouch) 100 Units/mL injection pen, Inject 15 Units under the skin daily at bedtime, Disp: 15 mL, Rfl: 3  •  Lancet Devices (Lancing Device) MISC, USE AS DIRECTED, Disp: 1 each, Rfl: 0  •  loperamide (IMODIUM) 2 mg capsule, Take 1 capsule (2 mg total) by mouth 2 (two) times a day as needed for diarrhea [place] carmen tableta dos veces por cynthia si tiene diarrea, Disp: 90 capsule, Rfl: 0  •  oxybutynin (DITROPAN) 5 mg tablet, Take 1 tablet (5 mg total) by mouth 2 (two) times a day as needed (bladder spasms), Disp: 60 tablet, Rfl: 0  •  pregabalin (LYRICA) 75 mg capsule, Take 1 capsule (75 mg total) by mouth 2 (two) times a day, Disp: 60 capsule, Rfl: 5  •  rosuvastatin (CRESTOR) 40 MG tablet, TAKE ONE (1) TABLET BY MOUTH DAILY, Disp: 30 tablet, Rfl: 5  •  tacrolimus (PROGRAF) 1 mg capsule, TAKE 1 CAPSULE (1 MG TOTAL) BY MOUTH EVERY 12 (TWELVE) HOURS, Disp: 180 capsule, Rfl: 3  •  Incontinence Supply Disposable (Incontinence Brief Medium) MISC, Use 4 (four) times a day, Disp: 120 each, Rfl: 0  •  Multiple Vitamin (multivitamin) tablet, Take 1 tablet by mouth daily (Patient not taking: Reported on 5/4/2023), Disp: , Rfl:   •  OneTouch Ultra test strip, TEST 3-4 TIMES A DAY, Disp: 100 each, Rfl: 4  •  tamsulosin (FLOMAX) 0 4 mg, Take 1 capsule (0 4 mg total) by mouth daily with dinner, Disp: 90 capsule, Rfl: 3  •  temazepam (RESTORIL) 7 5 mg capsule, TAKE 2 CAPSULES (15 MG TOTAL) BY MOUTH DAILY AT BEDTIME AS NEEDED FOR SLEEP, Disp: 30 capsule, Rfl: 5  •  zolpidem (AMBIEN) 5 mg tablet, Take 1 tablet (5 mg total) by mouth daily at bedtime as needed for sleep, Disp: 30 tablet, Rfl: 3    Laboratory Results:  Lab Results   Component Value Date    WBC 4 78 04/30/2023    HGB 7 2 (L) 04/30/2023    HCT 22 9 (L) 04/30/2023    MCV 77 (L) 04/30/2023    PLT 53 (L) 04/30/2023     Lab Results   Component Value Date    SODIUM 139 05/03/2023    K 4 5 05/03/2023     (H) 05/03/2023    CO2 21 05/03/2023    BUN 45 (H) 05/03/2023    CREATININE 3 14 (H) 05/03/2023    GLUC 93 05/03/2023    CALCIUM 8 0 (L) 05/03/2023     Lab Results   Component Value Date    CALCIUM 8 0 (L) 05/03/2023    PHOS 2 5 10/13/2022     No results found for: LABPROT

## 2023-05-19 ENCOUNTER — PATIENT OUTREACH (OUTPATIENT)
Dept: CASE MANAGEMENT | Facility: HOSPITAL | Age: 73
End: 2023-05-19

## 2023-05-19 NOTE — PROGRESS NOTES
OSW sent list of in-network outpatient [address] counselors via email to BAKARI IM GESÄUSE and called her today for supportive outreach  LM on answering machine with return call information  Will follow

## 2023-05-23 ENCOUNTER — TELEPHONE (OUTPATIENT)
Dept: HEMATOLOGY ONCOLOGY | Facility: CLINIC | Age: 73
End: 2023-05-23

## 2023-05-23 NOTE — TELEPHONE ENCOUNTER
Per Dr Juan De Anda, follow up appointment required  Spoke with Patrick Brown, she is agreeable to F/U on 5/30/2023 at 3:30 PM [facility]

## 2023-05-23 NOTE — TELEPHONE ENCOUNTER
----- Message from Joseph Montgomery RN sent at 5/23/2023 10:26 AM EDT -----  Marine Casas to [facility] Service      ----- Message -----  From: Derek Gupta MD  Sent: 5/23/2023  10:21 AM EDT  To: Ziyad Campos MD, Joseph Montgomery RN    [facility]  I saw him and they are willing to discuss [facility] therapy if it is an option for him  Dr Ifeanyi Gomez and I had communicated on this  Could you all reach out to them and see if he is a candidate    Thanks, Derek Gupta MD

## 2023-05-25 ENCOUNTER — PATIENT OUTREACH (OUTPATIENT)
Dept: CASE MANAGEMENT | Facility: HOSPITAL | Age: 73
End: 2023-05-25

## 2023-05-25 NOTE — PROGRESS NOTES
Supportive call placed to [facility] today  She stated she had to go to the ED because she felt weak and had low BP  She had some electrolyte abnormalities and was advised to f/u with PCP  Strongly encouraged her to f/u and give herself permission for self care and managing her own health needs  [facility] stated she received call from [facility] billing regarding Rogers's outstanding balance  She is interested in applying for F/A but is too overwhelmed  OSW offered to meet her at [facility] to help organize information and she is appreciative  She asked if this writer can call her next week to possibly arrange time on Wednesday, 5/31  Provided emotional support and validation of her caregiver burnout  She did not read this writer's email about counseling resources that were sent to her last week  Encouraged her to take a look  Will plan on call to [facility] next Tuesday, 5/30

## 2023-05-30 ENCOUNTER — OFFICE VISIT (OUTPATIENT)
Dept: HEMATOLOGY ONCOLOGY | Facility: CLINIC | Age: 73
End: 2023-05-30

## 2023-05-30 ENCOUNTER — PATIENT OUTREACH (OUTPATIENT)
Dept: CASE MANAGEMENT | Facility: HOSPITAL | Age: 73
End: 2023-05-30

## 2023-05-30 VITALS
TEMPERATURE: 96.8 F | DIASTOLIC BLOOD PRESSURE: 66 MMHG | HEIGHT: 62 IN | OXYGEN SATURATION: 98 % | HEART RATE: 55 BPM | WEIGHT: 125 LBS | SYSTOLIC BLOOD PRESSURE: 128 MMHG | BODY MASS INDEX: 23 KG/M2

## 2023-05-30 DIAGNOSIS — C78.00 BLADDER CANCER METASTASIZED TO LUNG (HCC): Primary | ICD-10-CM

## 2023-05-30 DIAGNOSIS — C67.9 BLADDER CANCER METASTASIZED TO LUNG (HCC): Primary | ICD-10-CM

## 2023-05-30 NOTE — PROGRESS NOTES
OSW called Sondra Garcia to arrange in-person visit tomorrow  Will plan on seeing her at [facility] tomorrow around 1330

## 2023-05-31 ENCOUNTER — PATIENT OUTREACH (OUTPATIENT)
Dept: CASE MANAGEMENT | Facility: HOSPITAL | Age: 73
End: 2023-05-31

## 2023-05-31 NOTE — PROGRESS NOTES
Hematology/Oncology Outpatient Follow- up Note  eBn Epstein 74 y o  male MRN: @ Encounter: [id number]        Date:  5/30/2023      Assessment / Plan:    76-year-old  male with complicated medical history including alcoholic cirrhosis of the liver, with subsequent liver transplant in 2010, hepatitis-C status post treatment, diabetes mellitus type 2, neuropathy, chronic kidney disease secondary to tacrolimus, coronary artery disease, nephrolithiasis was found to have gross hematuria in May 2022, cystoscopies showed at least 5 cm mass involving the right lateral posterior wall, ureterovesical junction on the trigone, could not be stented, status post right nephrostomy tube     Biopsy 5/26/22 showed 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   CT scan did not show any local or distant metastasis  Because of liver transplant he is not a good candidate for immunotherapy  He was treated with concurrent radiation therapy with gemcitabine 27 mg per m2 twice weekly x 2 weeks 7/23 - 3/4/53 complicated with dysuria, fatigue  After long discussion the patient and his wife decided not to proceed with any additional gemcitabine  8/25/22 He completed radiation therapy with 36 fractions to bladder and pelvis      10/8/22 RUL solid pulmonary nodule new since 5/23/2021, suspicious for pulmonary metastases  Discussed possibility of biopsy for confirmation,  Evaluation with radiation oncology for possible SBRT  Patient elected to observe  CT scan on 1/2023 showed increasing in the right upper lobe lung nodule 1 3 cm, few bilateral pulmonary nodules consistent with metastatic disease  2/23 underwent pulmonary nodule biopsy, complicated by hydropneumothorax  Pathology showed metastatic disease, histologically similar to his bladder tumor  We reviewed the CAT scan, showed progression of disease      Had a rodolfo discussion with patient and his wife that this represents stage IV, metastatic bladder cancer  Patient was unable to tolerate low-dose Gemzar with radiation therapy in the past   He would likely tolerate chemotherapy poorly  He states he would like to try chemotherapy, however  His wife, Harsh Glass, knowing how he tolerated treatment previously would rather he not pursue treatment  Palliative/hospice care versus low-dose Taxotere every other week chemotherapy discussed  Patient elected to not pursue treatment  4/26/23 - “Hemoglobin 8 3, platelets 92,301 from today  Spouse notified no blood transfusion warranted at this time  She verified, pt does not want treatment for cancer  Just best quality of life for remainder  Patient presents today to discuss possibility of immunotherapy for his malignancy  Reviewed that since he is status post liver transplant and is on immunosuppressive therapy, immunotherapy would not be a good idea for him  We discussed potential immune mediated actions  We also discussed that there may be adverse effect on his transplanted liver  Patient and wife agreed to defer any treatment or work-up  He is feeling very well and wants to enjoy the remainder of his life  Follow-up as needed          HPI:   13-year-old  male seen initially 6/14/22 regarding Muscle invasive bladder cancer  He has a complicated medical history including history of hepatitis-C, cirrhosis of the liver with subsequent liver transplant in 2010, diabetes mellitus type 2, diabetic neuropathy, benign paroxysmal positional vertigo, subdural hygroma, chronic kidney disease grade 3-4, history of previous alcoholism  Presented with gross hematuria in May 2022  CT scan of the abdomen and pelvis showed moderate right-sided hydroureteronephrosis with ureteral calculus seen, bladder mass measuring 7 4 x 6 6 cm was possible soft tissue extension into the right ureterovesical junction    Status post cystoscopy with 5 cm mass involving the right side as well as the trigone unable to stand the right-sided hydronephrosis status post nephrostomy tube  Biopsy showed 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, he was not candidate for surgical resection because of comorbidities, liver transplant as well as advanced age  Because of liver transplant he is not a good candidate for immunotherapy  The patient felt to be candidate for concurrent radiation with low-dose gemcitabine  He was treated with concurrent radiation therapy with gemcitabine 27 mg per m2 twice weekly x 2 weeks 4/44 - 2/0/84 complicated with dysuria, fatigue  After long discussion the patient and his wife decided not to proceed with any additional gemcitabine finished radiation therapy on 8/25/2022     CAT scan on 1/10/2022 showed 9 mm right upper lobe nodule status post right percutaneous nephroureteral stent        1/18/23 right trigone bladder lesion -   Right trigone bladder lesion:     - Partially denuded urothelium and underlying fibromuscular stroma with chronic lymphoplasmacytic inflammation and histologic changes compatible with prior biopsy      - No carcinoma identified      CT scan on January 23, 2023 showed increasing in the right upper lobe lung nodule to 1 3 cm, few new pulmonary nodules bilaterally measuring millimeters        2/20/23 RUL nodule biospy - Poorly differentiated carcinoma, the tumor is favored to represent a metastasis from the patient's known high-grade urothelial carcinoma, [id number], which appears histologically similar  2/24 - 3/1/23 admitted to the hospital with right-sided chest pain  Hydropneumothorax identified  He underwent chest tube placement     CAT scan in February 2023 showed progression of disease     Interval History: Patient is feeling very well  Question re: immunotherapy for treatment          Review of Systems   Constitutional: Negative for appetite change, chills, diaphoresis, fatigue, fever and unexpected weight change  HENT:   Negative for mouth sores, nosebleeds, sore throat, tinnitus and voice change  Eyes: Negative for eye problems  Respiratory: Negative for chest tightness, cough, shortness of breath and wheezing  Cardiovascular: Negative for chest pain, leg swelling and palpitations  Gastrointestinal: Negative for abdominal distention, abdominal pain, blood in stool, constipation, diarrhea, nausea, rectal pain and vomiting  Endocrine: Negative for hot flashes  Genitourinary: Negative  Musculoskeletal: Negative for gait problem and myalgias  Skin: Negative for itching and rash  Neurological: Negative for dizziness, gait problem, headaches, light-headedness and numbness  Hematological: Negative for adenopathy  Psychiatric/Behavioral: Negative for confusion and sleep disturbance  The patient is not nervous/anxious  Test Results:        Labs:   Lab Results   Component Value Date    HCT 22 9 (L) 04/30/2023    HGB 7 2 (L) 04/30/2023    MCV 77 (L) 04/30/2023    NRBC 0 04/27/2023    PLT 53 (L) 04/30/2023    WBC 4 78 04/30/2023     Lab Results   Component Value Date    ALKPHOS 76 04/27/2023    ALT 52 04/27/2023    ANIONGAP 13 12/17/2015    AST 57 (H) 04/27/2023    BILITOT 0 51 12/15/2015    BUN 45 (H) 05/03/2023    CALCIUM 8 0 (L) 05/03/2023     (H) 05/03/2023    CO2 21 05/03/2023    CORRECTEDCA 9 0 04/27/2023    CREATININE 3 14 (H) 05/03/2023    EGFR 18 05/03/2023    GLUCOSE 179 (H) 08/05/2022    GLUF 93 05/03/2023    K 4 5 05/03/2023     12/17/2015    PROT 7 7 12/15/2015           Imaging: US kidney and bladder with pvr    Result Date: 5/15/2023  Narrative: RENAL ULTRASOUND WITH PVR INDICATION:   N35 913: Unspecified membranous urethral stricture, male  COMPARISON: April 27, 2023, September 4, 2022 TECHNIQUE:   Ultrasound of the retroperitoneum was performed with a curvilinear transducer utilizing volumetric sweeps and still imaging techniques   FINDINGS: "KIDNEYS: Asymmetry, moderate scarring and atrophy on the right relative to left  Right kidney:  6 8 x 3 9 x 3 6 cm  Volume 49 6 mL Left kidney:  9 3 x 5 0 x 4 2 cm  Volume 101 5 mL Right kidney Atrophic scarring and relative increased cortical echogenicity  No mass is identified  Mild caliectasis  Right double-J ureteral stent present  No shadowing calculi  No perinephric fluid collections  Left kidney Normal echogenicity and contour  No mass is identified  Small simple cyst upper pole 9 mm  No hydronephrosis  No shadowing calculi  No perinephric fluid collections  URETERS: Nonvisualized  BLADDER: Normally distended  TURP defect noted  Indwelling right ureteral stent noted  Prevoid: 33 8 No significant post void volume  Measured post void volume in mL: 7 7     Impression: Mild caliectasis on the right, indwelling ureteral stent present  Moderate atrophy and right renal scarring  Workstation performed: [id number]             Allergies: Allergies   Allergen Reactions   • Tequin [Gatifloxacin] Rash   • Lipitor [Atorvastatin] Hives and Other (See Comments)     Rash and itching   • Ciprofloxacin Rash   • Iohexol Hives and Other (See Comments)     Contraindication with rosuvastatin      • Iv Contrast [Iodinated Contrast Media] Rash and Other (See Comments)     Unknown   • Levofloxacin Rash     Current Medications: Reviewed  PMH/FH/SH:  Reviewed      Physical Exam:    1 57 meters squared    Ht Readings from Last 3 Encounters:   05/30/23 5' 2\" (1 575 m)   05/04/23 5' 2\" (1 575 m)   04/27/23 5' 2\" (1 575 m)        Wt Readings from Last 3 Encounters:   05/30/23 56 7 kg (125 lb)   05/04/23 56 3 kg (124 lb 3 2 oz)   05/02/23 56 9 kg (125 lb 6 4 oz)        Temp Readings from Last 3 Encounters:   05/30/23 (!) 96 8 °F (36 °C) (Temporal)   05/04/23 (!) 96 1 °F (35 6 °C)   05/02/23 (!) 97 2 °F (36 2 °C) (Temporal)        BP Readings from Last 3 Encounters:   05/30/23 128/66   05/04/23 120/62   05/02/23 130/60       " Physical Exam  Vitals reviewed  Constitutional:       General: He is not in acute distress  Appearance: He is well-developed  He is not diaphoretic  HENT:      Head: Normocephalic and atraumatic  Eyes:      Conjunctiva/sclera: Conjunctivae normal    Neck:      Trachea: No tracheal deviation  Cardiovascular:      Rate and Rhythm: Normal rate and regular rhythm  Heart sounds: No murmur heard  No friction rub  No gallop  Pulmonary:      Effort: Pulmonary effort is normal  No respiratory distress  Breath sounds: Normal breath sounds  No wheezing or rales  Chest:      Chest wall: No tenderness  Abdominal:      General: There is no distension  Palpations: Abdomen is soft  Tenderness: There is no abdominal tenderness  Musculoskeletal:      Cervical back: Normal range of motion and neck supple  Lymphadenopathy:      Cervical: No cervical adenopathy  Skin:     General: Skin is warm and dry  Coloration: Skin is not pale  Findings: No erythema  Neurological:      Mental Status: He is alert and oriented to person, place, and time  Psychiatric:         Behavior: Behavior normal          Thought Content:  Thought content normal          Judgment: Judgment normal          ECO    Emergency Contacts:    Extended Emergency Contact Information  Primary Emergency Contact: Luciana Lynn   [address] Phone: [phone]  Mobile Phone: [phone]  Relation: Significant Other  Secondary Emergency Contact: [address]  Mobile Phone: [phone]  Relation: Daughter

## 2023-05-31 NOTE — PROGRESS NOTES
"OSW and Radha Huggins met today as scheduled  Completed [place] Medicaid application and [facility] financial hardship application to the best of her ability  She will look over the documents and review with Terence Caicedo at home  She provided a heating oil bill in the amount of $1740  88  She explained Terry Wilkinson gave her a one time $300 payment this winter  She is on a \"[employer] budget\" with the [address] but her monthly payment is supposed to be $440 00  Radha Huggins can give them about $250 00 per month  OSW offered to submit for [employer] and she is appreciative  Provided emotional support and active listening  Radha Huggins reflected on Rogers's past medical issues before his liver transplant  She would spend days at his bedside in the hospital and take care of him  Radha Huggins thanked OSW for the time spent today  She will send applications out once they are signed  OSW sent email to team requesting [employer] in the amount of $1740  88  Received approval  Await final steps and if W-9 form is required    "

## 2023-06-01 ENCOUNTER — PATIENT OUTREACH (OUTPATIENT)
Dept: CASE MANAGEMENT | Facility: HOSPITAL | Age: 73
End: 2023-06-01

## 2023-06-01 NOTE — PROGRESS NOTES
OSW received confirmation a W-9 form is required in order for compassion funds to be used  [facility] ([phone]) and s/w Darrian  Explained need for W-9  After obtaining permission from [employer] to s/w this Marbella Rising explained he will need to contact someone within the company to assist with this request  Provided direct number and email for form to be scanned to this writer  [employer] for assistance  OSW then called [employer] and explained above  She is very appreciative that Compass[employer] Funds can be used and thanked OSW  Will follow

## 2023-06-02 ENCOUNTER — DOCUMENTATION (OUTPATIENT)
Dept: HEMATOLOGY ONCOLOGY | Facility: CLINIC | Age: 73
End: 2023-06-02

## 2023-06-02 DIAGNOSIS — R53.1 GENERALIZED WEAKNESS: ICD-10-CM

## 2023-06-02 RX ORDER — OXYBUTYNIN CHLORIDE 5 MG/1
5 TABLET ORAL 2 TIMES DAILY PRN
Qty: 180 TABLET | Refills: 3 | Status: SHIPPED | OUTPATIENT
Start: 2023-06-02

## 2023-06-02 NOTE — TELEPHONE ENCOUNTER
Patient needs prescription delivered today; SHERLYN Chong reports patient has burning with urination  Advised to call back after taking medication and symptoms not resolved

## 2023-06-02 NOTE — PROGRESS NOTES
Pt currently not scheduled with Urology for ongoing bladder cancer surveillance  I saw your note from 4/2023 for stent exchange in 6 months  Does he need to be seen before then? Thank you!

## 2023-06-07 ENCOUNTER — PATIENT OUTREACH (OUTPATIENT)
Dept: CASE MANAGEMENT | Facility: HOSPITAL | Age: 73
End: 2023-06-07

## 2023-06-07 ENCOUNTER — HOSPITAL ENCOUNTER (OUTPATIENT)
Dept: INFUSION CENTER | Facility: HOSPITAL | Age: 73
Discharge: HOME | End: 2023-06-07
Payer: COMMERCIAL

## 2023-06-07 DIAGNOSIS — Z95.828 PORT-A-CATH IN PLACE: Primary | ICD-10-CM

## 2023-06-07 DIAGNOSIS — N18.9 CHRONIC KIDNEY DISEASE, UNSPECIFIED CKD STAGE: ICD-10-CM

## 2023-06-07 LAB
ANION GAP SERPL CALCULATED.3IONS-SCNC: 5 MMOL/L (ref 4–13)
BUN SERPL-MCNC: 53 MG/DL (ref 5–25)
CALCIUM SERPL-MCNC: 8.3 MG/DL (ref 8.3–10.1)
CHLORIDE SERPL-SCNC: 115 MMOL/L (ref 96–108)
CO2 SERPL-SCNC: 20 MMOL/L (ref 21–32)
CREAT SERPL-MCNC: 3.7 MG/DL (ref 0.6–1.3)
GFR SERPL CREATININE-BSD FRML MDRD: 15 ML/MIN/1.73SQ M
GLUCOSE SERPL-MCNC: 131 MG/DL (ref 65–140)
POTASSIUM SERPL-SCNC: 4.6 MMOL/L (ref 3.5–5.3)
SODIUM SERPL-SCNC: 140 MMOL/L (ref 135–147)

## 2023-06-07 PROCEDURE — 80048 BASIC METABOLIC PNL TOTAL CA: CPT

## 2023-06-07 NOTE — PROGRESS NOTES
rcw pac accessed and flushed central labs drawn for dr Banda Foil as requested by pt    Next appt scheduled prior to pt leaving today

## 2023-06-07 NOTE — PROGRESS NOTES
OSW called Rosibel today  She is being treated for a tooth abcess and has discomfort  She finished up the [facility] financial aid application and submitted it in person at [address] [place] [address]  She is working on the final pieces of the [place] application  Explained the check from [employer]h [employer] will be sent out to Sara Roth within the next 14 days  Offered supportive listening and support  Will continue to follow

## 2023-06-13 ENCOUNTER — TELEPHONE (OUTPATIENT)
Dept: UROLOGY | Facility: MEDICAL CENTER | Age: 73
End: 2023-06-13

## 2023-06-13 ENCOUNTER — APPOINTMENT (OUTPATIENT)
Dept: LAB | Facility: CLINIC | Age: 73
End: 2023-06-13
Payer: COMMERCIAL

## 2023-06-13 DIAGNOSIS — N30.00 ACUTE CYSTITIS WITHOUT HEMATURIA: Primary | ICD-10-CM

## 2023-06-13 DIAGNOSIS — N35.913 STRICTURE OF MEMBRANOUS URETHRA IN MALE, UNSPECIFIED STRICTURE TYPE: Primary | ICD-10-CM

## 2023-06-13 DIAGNOSIS — N18.9 CHRONIC KIDNEY DISEASE, UNSPECIFIED CKD STAGE: ICD-10-CM

## 2023-06-13 DIAGNOSIS — E11.40 CONTROLLED TYPE 2 DIABETES MELLITUS WITH DIABETIC NEUROPATHY, WITH LONG-TERM CURRENT USE OF INSULIN (HCC): ICD-10-CM

## 2023-06-13 DIAGNOSIS — Z79.4 CONTROLLED TYPE 2 DIABETES MELLITUS WITH DIABETIC NEUROPATHY, WITH LONG-TERM CURRENT USE OF INSULIN (HCC): ICD-10-CM

## 2023-06-13 DIAGNOSIS — N35.913 STRICTURE OF MEMBRANOUS URETHRA IN MALE, UNSPECIFIED STRICTURE TYPE: ICD-10-CM

## 2023-06-13 LAB
BACTERIA UR QL AUTO: ABNORMAL /HPF
BILIRUB UR QL STRIP: NEGATIVE
CLARITY UR: ABNORMAL
COLOR UR: YELLOW
GLUCOSE UR STRIP-MCNC: ABNORMAL MG/DL
HGB UR QL STRIP.AUTO: ABNORMAL
KETONES UR STRIP-MCNC: NEGATIVE MG/DL
LEUKOCYTE ESTERASE UR QL STRIP: ABNORMAL
NITRITE UR QL STRIP: NEGATIVE
NON-SQ EPI CELLS URNS QL MICRO: ABNORMAL /HPF
PH UR STRIP.AUTO: 6 [PH]
PROT UR STRIP-MCNC: ABNORMAL MG/DL
RBC #/AREA URNS AUTO: ABNORMAL /HPF
SP GR UR STRIP.AUTO: 1.02 (ref 1–1.03)
UROBILINOGEN UR STRIP-ACNC: <2 MG/DL
WBC #/AREA URNS AUTO: ABNORMAL /HPF

## 2023-06-13 PROCEDURE — 81001 URINALYSIS AUTO W/SCOPE: CPT

## 2023-06-13 PROCEDURE — 87086 URINE CULTURE/COLONY COUNT: CPT

## 2023-06-13 PROCEDURE — 87186 SC STD MICRODIL/AGAR DIL: CPT

## 2023-06-13 PROCEDURE — 87077 CULTURE AEROBIC IDENTIFY: CPT

## 2023-06-13 RX ORDER — INSULIN DETEMIR 100 [IU]/ML
15 INJECTION, SOLUTION SUBCUTANEOUS
Qty: 15 ML | Refills: 3 | Status: SHIPPED | OUTPATIENT
Start: 2023-06-13

## 2023-06-13 NOTE — TELEPHONE ENCOUNTER
Called and spoke with patient and spouse  Patient reports burning with urination and pressure in his bladder when he urinates  He does feel as though he is emptying out his bladder appropriately  Denies fevers, chills, or blood in the urine  Orders placed for urine testing and patient will present to the lab to leave a sample  Aware office will contact them with results

## 2023-06-13 NOTE — TELEPHONE ENCOUNTER
Pt's wife called requested a call back to speak with a nurse regarding pt had burning urination and pressure       Jose L Mclain can be reached at [phone] or [phone]

## 2023-06-14 ENCOUNTER — TELEPHONE (OUTPATIENT)
Dept: HEMATOLOGY ONCOLOGY | Facility: CLINIC | Age: 73
End: 2023-06-14

## 2023-06-14 ENCOUNTER — HOSPITAL ENCOUNTER (OUTPATIENT)
Dept: INFUSION CENTER | Facility: HOSPITAL | Age: 73
End: 2023-06-14

## 2023-06-14 RX ORDER — SULFAMETHOXAZOLE AND TRIMETHOPRIM 800; 160 MG/1; MG/1
1 TABLET ORAL 2 TIMES DAILY
Qty: 14 TABLET | Refills: 0 | Status: SHIPPED | OUTPATIENT
Start: 2023-06-14 | End: 2023-06-21

## 2023-06-14 NOTE — TELEPHONE ENCOUNTER
Returned call to patients spouse Kiah Woodard and made her aware of patients u/a results and script for Bactrim to [facility]  Aware office will update with final culture results

## 2023-06-14 NOTE — TELEPHONE ENCOUNTER
Appointment Confirmation   Who are you speaking with? Significant Other   If it is not the patient, are they listed on an active communication consent form? Yes   Which provider is the appointment scheduled with? infusion   When is the appointment scheduled? Please list date and time 07/12/2023 @2PM    At which location is the appointment scheduled to take place? [place]   Did caller verbalize understanding of appointment details?  Yes

## 2023-06-14 NOTE — TELEPHONE ENCOUNTER
Patient's spouse Jamaal Gudino called stating she would like to know the results of urine test   He is having a lot of discomfort and wants to make sure if he needs antibiotics are called in form him to [place]        [facility] to call [facility]    Patient can be reached at [phone]

## 2023-06-15 LAB — BACTERIA UR CULT: ABNORMAL

## 2023-06-20 DIAGNOSIS — G47.09 OTHER INSOMNIA: ICD-10-CM

## 2023-06-20 RX ORDER — ZOLPIDEM TARTRATE 5 MG/1
5 TABLET ORAL
Qty: 30 TABLET | Refills: 0 | Status: SHIPPED | OUTPATIENT
Start: 2023-06-20

## 2023-06-21 ENCOUNTER — PATIENT OUTREACH (OUTPATIENT)
Dept: CASE MANAGEMENT | Facility: HOSPITAL | Age: 73
End: 2023-06-21

## 2023-06-21 NOTE — PROGRESS NOTES
Supportive call placed to pt's S O  Heather Vu today  She stated Trenton Espitia was approved for financial aid through [facility] and 21 bills were paid  She has to reapply for him in December  She reported Trenton Espitia is feeling well and she is encouraging him to get out and take walks when he feels good  His mood has been positive and engaging  Heather Vu is following up on her own healthcare needs  Praised her for taking care of herself  She continues to encourage him to drink fluids/water to prevent UTI infections, which are common with him  Provided supportive and active listening  Will f/u next month and she is appreciative

## 2023-06-26 ENCOUNTER — RA CDI HCC (OUTPATIENT)
Dept: OTHER | Facility: HOSPITAL | Age: 73
End: 2023-06-26

## 2023-06-26 NOTE — PROGRESS NOTES
[facility]  coding opportunities       Chart reviewed, no opportunity found:   [address]        Patients Insurance     Medicare Insurance: [employer]

## 2023-06-27 ENCOUNTER — APPOINTMENT (OUTPATIENT)
Dept: LAB | Facility: CLINIC | Age: 73
End: 2023-06-27
Payer: COMMERCIAL

## 2023-06-27 DIAGNOSIS — N30.00 ACUTE CYSTITIS WITHOUT HEMATURIA: ICD-10-CM

## 2023-06-27 DIAGNOSIS — N18.9 CHRONIC KIDNEY DISEASE, UNSPECIFIED CKD STAGE: ICD-10-CM

## 2023-06-27 LAB
BACTERIA UR QL AUTO: ABNORMAL /HPF
BILIRUB UR QL STRIP: NEGATIVE
CLARITY UR: CLEAR
COLOR UR: ABNORMAL
GLUCOSE UR STRIP-MCNC: ABNORMAL MG/DL
HGB UR QL STRIP.AUTO: ABNORMAL
KETONES UR STRIP-MCNC: NEGATIVE MG/DL
LEUKOCYTE ESTERASE UR QL STRIP: ABNORMAL
NITRITE UR QL STRIP: NEGATIVE
NON-SQ EPI CELLS URNS QL MICRO: ABNORMAL /HPF
PH UR STRIP.AUTO: 6 [PH]
PROT UR STRIP-MCNC: ABNORMAL MG/DL
RBC #/AREA URNS AUTO: ABNORMAL /HPF
SP GR UR STRIP.AUTO: 1.02 (ref 1–1.03)
UROBILINOGEN UR STRIP-ACNC: <2 MG/DL
WBC #/AREA URNS AUTO: ABNORMAL /HPF

## 2023-06-27 PROCEDURE — 87086 URINE CULTURE/COLONY COUNT: CPT

## 2023-06-27 PROCEDURE — 87147 CULTURE TYPE IMMUNOLOGIC: CPT

## 2023-06-28 DIAGNOSIS — N30.00 ACUTE CYSTITIS WITHOUT HEMATURIA: Primary | ICD-10-CM

## 2023-06-28 RX ORDER — CEPHALEXIN 500 MG/1
500 CAPSULE ORAL EVERY 8 HOURS SCHEDULED
Qty: 21 CAPSULE | Refills: 0 | Status: SHIPPED | OUTPATIENT
Start: 2023-06-28 | End: 2023-07-05

## 2023-06-29 LAB — BACTERIA UR CULT: NORMAL

## 2023-07-12 ENCOUNTER — TELEPHONE (OUTPATIENT)
Dept: UROLOGY | Facility: CLINIC | Age: 73
End: 2023-07-12

## 2023-07-12 ENCOUNTER — HOSPITAL ENCOUNTER (OUTPATIENT)
Dept: INFUSION CENTER | Facility: HOSPITAL | Age: 73
Discharge: HOME | End: 2023-07-12
Payer: COMMERCIAL

## 2023-07-12 DIAGNOSIS — R30.0 DYSURIA: Primary | ICD-10-CM

## 2023-07-12 DIAGNOSIS — R30.0 DYSURIA: ICD-10-CM

## 2023-07-12 DIAGNOSIS — Z95.828 PORT-A-CATH IN PLACE: Primary | ICD-10-CM

## 2023-07-12 DIAGNOSIS — N18.9 CHRONIC KIDNEY DISEASE, UNSPECIFIED CKD STAGE: ICD-10-CM

## 2023-07-12 LAB
ANION GAP SERPL CALCULATED.3IONS-SCNC: 4 MMOL/L
BACTERIA UR QL AUTO: ABNORMAL /HPF
BILIRUB UR QL STRIP: NEGATIVE
BUN SERPL-MCNC: 54 MG/DL (ref 5–25)
CALCIUM SERPL-MCNC: 8.2 MG/DL (ref 8.3–10.1)
CHLORIDE SERPL-SCNC: 114 MMOL/L (ref 96–108)
CLARITY UR: CLEAR
CO2 SERPL-SCNC: 18 MMOL/L (ref 21–32)
COLOR UR: ABNORMAL
CREAT SERPL-MCNC: 4.22 MG/DL (ref 0.6–1.3)
GFR SERPL CREATININE-BSD FRML MDRD: 12 ML/MIN/1.73SQ M
GLUCOSE SERPL-MCNC: 141 MG/DL (ref 65–140)
GLUCOSE UR STRIP-MCNC: NEGATIVE MG/DL
HGB UR QL STRIP.AUTO: ABNORMAL
KETONES UR STRIP-MCNC: NEGATIVE MG/DL
LEUKOCYTE ESTERASE UR QL STRIP: ABNORMAL
NITRITE UR QL STRIP: NEGATIVE
NON-SQ EPI CELLS URNS QL MICRO: ABNORMAL /HPF
PH UR STRIP.AUTO: 6.5 [PH]
POTASSIUM SERPL-SCNC: 5.6 MMOL/L (ref 3.5–5.3)
PROT UR STRIP-MCNC: ABNORMAL MG/DL
RBC #/AREA URNS AUTO: ABNORMAL /HPF
SODIUM SERPL-SCNC: 136 MMOL/L (ref 135–147)
SP GR UR STRIP.AUTO: 1.02 (ref 1–1.03)
UROBILINOGEN UR STRIP-ACNC: <2 MG/DL
WBC #/AREA URNS AUTO: ABNORMAL /HPF

## 2023-07-12 PROCEDURE — 80048 BASIC METABOLIC PNL TOTAL CA: CPT

## 2023-07-12 PROCEDURE — 87086 URINE CULTURE/COLONY COUNT: CPT

## 2023-07-12 PROCEDURE — 81001 URINALYSIS AUTO W/SCOPE: CPT

## 2023-07-12 NOTE — PROGRESS NOTES
Port flushed per protocol, labs drawn and urine sent as ordered. PT declines AVS, return as scheduled.

## 2023-07-13 ENCOUNTER — TELEPHONE (OUTPATIENT)
Dept: UROLOGY | Facility: CLINIC | Age: 73
End: 2023-07-13

## 2023-07-13 LAB — BACTERIA UR CULT: NORMAL

## 2023-07-13 NOTE — TELEPHONE ENCOUNTER
----- Message from Radha Garcia Dr sent at 7/13/2023 12:23 PM EDT -----  Please let patient know urine testing did not show any signs of infection

## 2023-07-17 NOTE — TELEPHONE ENCOUNTER
Called and left  for patients significant other Rosibel with patients negative urine culture results. Advised adequate hydration and prn Oxybutynin as ordered. Asked Morgan Holland to call back with any questions or concerns.

## 2023-07-17 NOTE — TELEPHONE ENCOUNTER
Patients wife calling in stating she never received call regarding patients UC results. Patients wife would like a call back.      CB: [phone]

## 2023-07-24 ENCOUNTER — HOSPITAL ENCOUNTER (OUTPATIENT)
Facility: HOSPITAL | Age: 73
Setting detail: OBSERVATION
Discharge: HOME | End: 2023-07-25
Attending: EMERGENCY MEDICINE | Admitting: FAMILY MEDICINE
Payer: COMMERCIAL

## 2023-07-24 ENCOUNTER — APPOINTMENT (EMERGENCY)
Dept: RADIOLOGY | Facility: HOSPITAL | Age: 73
End: 2023-07-24
Payer: COMMERCIAL

## 2023-07-24 DIAGNOSIS — R10.84 GENERALIZED ABDOMINAL PAIN: Primary | ICD-10-CM

## 2023-07-24 LAB
2HR DELTA HS TROPONIN: -2 NG/L
ALBUMIN SERPL BCP-MCNC: 3.7 G/DL (ref 3.5–5)
ALP SERPL-CCNC: 80 U/L (ref 46–116)
ALT SERPL W P-5'-P-CCNC: 37 U/L (ref 12–78)
AMMONIA PLAS-SCNC: 13 UMOL/L (ref 11–35)
ANION GAP SERPL CALCULATED.3IONS-SCNC: 7 MMOL/L
APTT PPP: 30 SECONDS (ref 23–37)
AST SERPL W P-5'-P-CCNC: 53 U/L (ref 5–45)
ATRIAL RATE: 258 BPM
BACTERIA UR QL AUTO: ABNORMAL /HPF
BASOPHILS # BLD AUTO: 0.03 THOUSANDS/ÂΜL (ref 0–0.1)
BASOPHILS NFR BLD AUTO: 1 % (ref 0–1)
BILIRUB SERPL-MCNC: 0.24 MG/DL (ref 0.2–1)
BILIRUB UR QL STRIP: NEGATIVE
BUN SERPL-MCNC: 54 MG/DL (ref 5–25)
CALCIUM SERPL-MCNC: 8.8 MG/DL (ref 8.3–10.1)
CARDIAC TROPONIN I PNL SERPL HS: 10 NG/L
CARDIAC TROPONIN I PNL SERPL HS: 8 NG/L
CHLORIDE SERPL-SCNC: 115 MMOL/L (ref 96–108)
CLARITY UR: CLEAR
CO2 SERPL-SCNC: 19 MMOL/L (ref 21–32)
COLOR UR: COLORLESS
CREAT SERPL-MCNC: 3.6 MG/DL (ref 0.6–1.3)
EOSINOPHIL # BLD AUTO: 0.28 THOUSAND/ÂΜL (ref 0–0.61)
EOSINOPHIL NFR BLD AUTO: 5 % (ref 0–6)
ERYTHROCYTE [DISTWIDTH] IN BLOOD BY AUTOMATED COUNT: 16.6 % (ref 11.6–15.1)
GFR SERPL CREATININE-BSD FRML MDRD: 15 ML/MIN/1.73SQ M
GLUCOSE SERPL-MCNC: 81 MG/DL (ref 65–140)
GLUCOSE SERPL-MCNC: 92 MG/DL (ref 65–140)
GLUCOSE UR STRIP-MCNC: NEGATIVE MG/DL
HCT VFR BLD AUTO: 26.5 % (ref 36.5–49.3)
HGB BLD-MCNC: 8.4 G/DL (ref 12–17)
HGB UR QL STRIP.AUTO: ABNORMAL
IMM GRANULOCYTES # BLD AUTO: 0.03 THOUSAND/UL (ref 0–0.2)
IMM GRANULOCYTES NFR BLD AUTO: 1 % (ref 0–2)
INR PPP: 1.1 (ref 0.84–1.19)
KETONES UR STRIP-MCNC: NEGATIVE MG/DL
LACTATE SERPL-SCNC: 1.5 MMOL/L (ref 0.5–2)
LEUKOCYTE ESTERASE UR QL STRIP: ABNORMAL
LIPASE SERPL-CCNC: 236 U/L (ref 73–393)
LYMPHOCYTES # BLD AUTO: 1.15 THOUSANDS/ÂΜL (ref 0.6–4.47)
LYMPHOCYTES NFR BLD AUTO: 22 % (ref 14–44)
MCH RBC QN AUTO: 24.3 PG (ref 26.8–34.3)
MCHC RBC AUTO-ENTMCNC: 31.7 G/DL (ref 31.4–37.4)
MCV RBC AUTO: 77 FL (ref 82–98)
MONOCYTES # BLD AUTO: 0.46 THOUSAND/ÂΜL (ref 0.17–1.22)
MONOCYTES NFR BLD AUTO: 9 % (ref 4–12)
NEUTROPHILS # BLD AUTO: 3.39 THOUSANDS/ÂΜL (ref 1.85–7.62)
NEUTS SEG NFR BLD AUTO: 62 % (ref 43–75)
NITRITE UR QL STRIP: NEGATIVE
NON-SQ EPI CELLS URNS QL MICRO: ABNORMAL /HPF
NRBC BLD AUTO-RTO: 0 /100 WBCS
P AXIS: 65 DEGREES
PH UR STRIP.AUTO: 7.5 [PH]
PLATELET # BLD AUTO: 67 THOUSANDS/UL (ref 149–390)
POTASSIUM SERPL-SCNC: 4.5 MMOL/L (ref 3.5–5.3)
PR INTERVAL: 160 MS
PROCALCITONIN SERPL-MCNC: 0.13 NG/ML
PROT SERPL-MCNC: 8.8 G/DL (ref 6.4–8.4)
PROT UR STRIP-MCNC: ABNORMAL MG/DL
PROTHROMBIN TIME: 14.4 SECONDS (ref 11.6–14.5)
QRS AXIS: 0 DEGREES
QRSD INTERVAL: 70 MS
QT INTERVAL: 410 MS
QTC INTERVAL: 422 MS
RBC # BLD AUTO: 3.45 MILLION/UL (ref 3.88–5.62)
RBC #/AREA URNS AUTO: ABNORMAL /HPF
SODIUM SERPL-SCNC: 141 MMOL/L (ref 135–147)
SP GR UR STRIP.AUTO: 1.01 (ref 1–1.03)
T WAVE AXIS: 28 DEGREES
TACROLIMUS BLD-MCNC: 2 NG/ML (ref 3–15)
UROBILINOGEN UR STRIP-ACNC: <2 MG/DL
VENTRICULAR RATE: 64 BPM
WBC # BLD AUTO: 5.34 THOUSAND/UL (ref 4.31–10.16)
WBC #/AREA URNS AUTO: ABNORMAL /HPF

## 2023-07-24 PROCEDURE — 87040 BLOOD CULTURE FOR BACTERIA: CPT

## 2023-07-24 PROCEDURE — 93005 ELECTROCARDIOGRAM TRACING: CPT

## 2023-07-24 PROCEDURE — 71046 X-RAY EXAM CHEST 2 VIEWS: CPT

## 2023-07-24 PROCEDURE — 93010 ELECTROCARDIOGRAM REPORT: CPT | Performed by: INTERNAL MEDICINE

## 2023-07-24 PROCEDURE — 80053 COMPREHEN METABOLIC PANEL: CPT

## 2023-07-24 PROCEDURE — 85610 PROTHROMBIN TIME: CPT

## 2023-07-24 PROCEDURE — 84145 PROCALCITONIN (PCT): CPT

## 2023-07-24 PROCEDURE — 85730 THROMBOPLASTIN TIME PARTIAL: CPT

## 2023-07-24 PROCEDURE — 83605 ASSAY OF LACTIC ACID: CPT

## 2023-07-24 PROCEDURE — 99223 1ST HOSP IP/OBS HIGH 75: CPT | Performed by: FAMILY MEDICINE

## 2023-07-24 PROCEDURE — 82948 REAGENT STRIP/BLOOD GLUCOSE: CPT

## 2023-07-24 PROCEDURE — 99285 EMERGENCY DEPT VISIT HI MDM: CPT | Performed by: EMERGENCY MEDICINE

## 2023-07-24 PROCEDURE — G1004 CDSM NDSC: HCPCS

## 2023-07-24 PROCEDURE — 96361 HYDRATE IV INFUSION ADD-ON: CPT

## 2023-07-24 PROCEDURE — 81001 URINALYSIS AUTO W/SCOPE: CPT

## 2023-07-24 PROCEDURE — 36415 COLL VENOUS BLD VENIPUNCTURE: CPT

## 2023-07-24 PROCEDURE — 84484 ASSAY OF TROPONIN QUANT: CPT

## 2023-07-24 PROCEDURE — 99285 EMERGENCY DEPT VISIT HI MDM: CPT

## 2023-07-24 PROCEDURE — 85025 COMPLETE CBC W/AUTO DIFF WBC: CPT

## 2023-07-24 PROCEDURE — 83690 ASSAY OF LIPASE: CPT | Performed by: FAMILY MEDICINE

## 2023-07-24 PROCEDURE — 82140 ASSAY OF AMMONIA: CPT

## 2023-07-24 PROCEDURE — 74176 CT ABD & PELVIS W/O CONTRAST: CPT

## 2023-07-24 PROCEDURE — 80197 ASSAY OF TACROLIMUS: CPT

## 2023-07-24 PROCEDURE — 96374 THER/PROPH/DIAG INJ IV PUSH: CPT

## 2023-07-24 RX ORDER — MAGNESIUM HYDROXIDE/ALUMINUM HYDROXICE/SIMETHICONE 120; 1200; 1200 MG/30ML; MG/30ML; MG/30ML
30 SUSPENSION ORAL ONCE
Status: COMPLETED | OUTPATIENT
Start: 2023-07-24 | End: 2023-07-24

## 2023-07-24 RX ORDER — ZOLPIDEM TARTRATE 5 MG/1
5 TABLET ORAL
Status: DISCONTINUED | OUTPATIENT
Start: 2023-07-24 | End: 2023-07-25 | Stop reason: HOSPADM

## 2023-07-24 RX ORDER — HYDROMORPHONE HCL/PF 1 MG/ML
0.5 SYRINGE (ML) INJECTION EVERY 4 HOURS PRN
Status: DISCONTINUED | OUTPATIENT
Start: 2023-07-24 | End: 2023-07-25 | Stop reason: HOSPADM

## 2023-07-24 RX ORDER — ACETAMINOPHEN 325 MG/1
650 TABLET ORAL EVERY 6 HOURS PRN
Status: DISCONTINUED | OUTPATIENT
Start: 2023-07-24 | End: 2023-07-25 | Stop reason: HOSPADM

## 2023-07-24 RX ORDER — PANTOPRAZOLE SODIUM 40 MG/1
40 TABLET, DELAYED RELEASE ORAL
Status: DISCONTINUED | OUTPATIENT
Start: 2023-07-24 | End: 2023-07-25 | Stop reason: HOSPADM

## 2023-07-24 RX ORDER — PREGABALIN 75 MG/1
75 CAPSULE ORAL 2 TIMES DAILY
Status: DISCONTINUED | OUTPATIENT
Start: 2023-07-24 | End: 2023-07-25 | Stop reason: HOSPADM

## 2023-07-24 RX ORDER — ONDANSETRON 2 MG/ML
4 INJECTION INTRAMUSCULAR; INTRAVENOUS EVERY 6 HOURS PRN
Status: DISCONTINUED | OUTPATIENT
Start: 2023-07-24 | End: 2023-07-25 | Stop reason: HOSPADM

## 2023-07-24 RX ORDER — ALBUTEROL SULFATE 90 UG/1
2 AEROSOL, METERED RESPIRATORY (INHALATION) EVERY 6 HOURS PRN
Status: DISCONTINUED | OUTPATIENT
Start: 2023-07-24 | End: 2023-07-25 | Stop reason: HOSPADM

## 2023-07-24 RX ORDER — TAMSULOSIN HYDROCHLORIDE 0.4 MG/1
0.4 CAPSULE ORAL
Status: DISCONTINUED | OUTPATIENT
Start: 2023-07-24 | End: 2023-07-25 | Stop reason: HOSPADM

## 2023-07-24 RX ORDER — TEMAZEPAM 15 MG/1
15 CAPSULE ORAL
Status: DISCONTINUED | OUTPATIENT
Start: 2023-07-24 | End: 2023-07-25 | Stop reason: HOSPADM

## 2023-07-24 RX ORDER — INSULIN LISPRO 100 [IU]/ML
1-6 INJECTION, SOLUTION INTRAVENOUS; SUBCUTANEOUS
Status: DISCONTINUED | OUTPATIENT
Start: 2023-07-24 | End: 2023-07-25 | Stop reason: HOSPADM

## 2023-07-24 RX ORDER — HEPARIN SODIUM 5000 [USP'U]/ML
5000 INJECTION, SOLUTION INTRAVENOUS; SUBCUTANEOUS EVERY 8 HOURS SCHEDULED
Status: DISCONTINUED | OUTPATIENT
Start: 2023-07-24 | End: 2023-07-24

## 2023-07-24 RX ORDER — TACROLIMUS 1 MG/1
1 CAPSULE ORAL EVERY 12 HOURS
Status: DISCONTINUED | OUTPATIENT
Start: 2023-07-24 | End: 2023-07-25 | Stop reason: HOSPADM

## 2023-07-24 RX ORDER — HYDROMORPHONE HCL/PF 1 MG/ML
0.5 SYRINGE (ML) INJECTION ONCE
Status: COMPLETED | OUTPATIENT
Start: 2023-07-24 | End: 2023-07-24

## 2023-07-24 RX ORDER — ATORVASTATIN CALCIUM 40 MG/1
40 TABLET, FILM COATED ORAL
Status: DISCONTINUED | OUTPATIENT
Start: 2023-07-24 | End: 2023-07-25 | Stop reason: HOSPADM

## 2023-07-24 RX ADMIN — HYDROMORPHONE HYDROCHLORIDE 0.5 MG: 1 INJECTION, SOLUTION INTRAMUSCULAR; INTRAVENOUS; SUBCUTANEOUS at 22:36

## 2023-07-24 RX ADMIN — SODIUM CHLORIDE 1000 ML: 0.9 INJECTION, SOLUTION INTRAVENOUS at 11:06

## 2023-07-24 RX ADMIN — TAMSULOSIN HYDROCHLORIDE 0.4 MG: 0.4 CAPSULE ORAL at 18:37

## 2023-07-24 RX ADMIN — INSULIN DETEMIR 10 UNITS: 100 INJECTION, SOLUTION SUBCUTANEOUS at 21:35

## 2023-07-24 RX ADMIN — TEMAZEPAM 15 MG: 15 CAPSULE ORAL at 23:04

## 2023-07-24 RX ADMIN — ZOLPIDEM TARTRATE 5 MG: 5 TABLET ORAL at 23:04

## 2023-07-24 RX ADMIN — HYDROMORPHONE HYDROCHLORIDE 0.5 MG: 1 INJECTION, SOLUTION INTRAMUSCULAR; INTRAVENOUS; SUBCUTANEOUS at 11:06

## 2023-07-24 RX ADMIN — ALUMINUM HYDROXIDE, MAGNESIUM HYDROXIDE, AND SIMETHICONE 30 ML: 200; 200; 20 SUSPENSION ORAL at 18:37

## 2023-07-24 RX ADMIN — PREGABALIN 75 MG: 75 CAPSULE ORAL at 18:37

## 2023-07-24 RX ADMIN — HYDROMORPHONE HYDROCHLORIDE 0.5 MG: 1 INJECTION, SOLUTION INTRAMUSCULAR; INTRAVENOUS; SUBCUTANEOUS at 17:11

## 2023-07-24 RX ADMIN — PANTOPRAZOLE SODIUM 40 MG: 40 TABLET, DELAYED RELEASE ORAL at 18:37

## 2023-07-24 RX ADMIN — TACROLIMUS 1 MG: 1 CAPSULE ORAL at 21:36

## 2023-07-24 RX ADMIN — ATORVASTATIN CALCIUM 40 MG: 40 TABLET, FILM COATED ORAL at 18:37

## 2023-07-24 RX ADMIN — ACETAMINOPHEN 650 MG: 325 TABLET, FILM COATED ORAL at 21:35

## 2023-07-24 NOTE — ED NOTES
MD aware of need for ultrasounded line, due to pt refusing RN sticks for blood cultures. Pt was educated also on need for peripheral sticks and not using his port for blood cultures.       Jessie Najera RN  07/24/23 [id number]

## 2023-07-24 NOTE — ASSESSMENT & PLAN NOTE
• Known history of bladder tumor status post resection and radiation  • Follow-up with Urology  • Patient with right-sided nephrostomy tube  • Patient present with port cath as well, no current treatment is being completed  • Follow up outpatient with hematology-oncology

## 2023-07-24 NOTE — APP STUDENT NOTE
Assessment and Plan:  Generalized Epigastric Pain  · Patient presented to the ED with three days of generalized abdominal pain, nausea, and poor oral intake. · Upon presentation the patient CBC was found to have low hemoglobin. .. CMP had elevated AST with normal alk phos, and increase in Creatinine   · Lactic acid was 1.5, with preliminary growth of blood cultures having no growth  · Coag studies, procalcitonin, ammonia, tropinin were unremarkable  · UA showed small leukocytes and blood, urine microscopic WBC were 4-10. Kriss Beau no antibiotic treatment necessary  · CT abdomen pelvis w/o contrast showed no acute abnormalities but there was right double J nephroureteral stent in place with mildright hydroureteronephrosis which is decreased from last study  · XR of the chest showed bilateral lung metastases with no acute disease  · US of abdomen was negative for pathologic processes  · Placed orders for inpatient management for pain control and observation  · Will continue dilaudid injection 0.5 mg every 4 hours PRN, future consult to pain services if pain does not improve  · Serial abdominal examinations while in the hospital and potential repeat of CT scan if there is worsening of pain  · Epigastric pain secondary to viral cause or lack of oral intake, encourage feedings slowly    Bladder cancer with metastases to the lung  • Known history of bladder tumor status post resection and radiation  • Follow-up with Urology  • Patient with right-sided nephrostomy tube  • Patient present with port cath as well, no current treatment is being completed  • Follow up outpatient with hematology-oncology    S/P Liver transplant  • Continue with Prograf  • Check Prograf level daily    Chronic Kidney Disease; Stage 4 Severe  · Creatinine today: 3.60, GFR 15, BUN 54  · Close to his baseline around 3.10-3.30  · If considering IV hydration, continue to monitor renal function and gentle hydration recommended  · Monitor creatinine.   Follow-up with nephrology as outpatient    Diabetes Type Two With Current Use of Insulin  · Patient is on insulin as outpatient. · Continue with the Levemir and add sliding scale  · Monitor blood sugar  · Hypoglycemia protocol in place  · Adjust insulin dose per blood sugar  · Place patient on diabetic diet consisting of low carb, low sugar use  · Monitoring with POCT glucose checks    Anemia Secondary to CKD  · Hemoglobin was 8.4 today, continue to monitor with CBC in the AM  · May need future type and screen if hemoglobin drops below 8, transfuse below 7  · Secondary to Stage 4 CKD    Thrombocytopenia  • Monitor for now with CBC in the AM  • At baseline as of 7/24    Hyperlipidemia  · Patient states that he does not take his at home rosuvastatin 40 mg prescribed by this PCP  · Monitor lipid levels with a lipid panel while in the hospital  · Encouraged diet and health modifications    Stress/ Urge Incontinence  · Patient is prescribed oxybutnin 1 tablet, two times per day for urge incontinence  · Patient prescribed flomax 0.4 mg by mouth with dinner  · Continue medications    Insomnia   · Patient prescribed temazepam (Restoril) 7.5 mg capsule to take 2 capsules by mouth daily at bedtime as needed for sleep as well as ambien 5 mg by mouth daily at bedtime as needed for sleep  · Consideration of continuing the restoril at nighttime and replacing ambien with ativan as needed if the patient still cannot fall asleep  · Caution with ambien as the patient is being treated with diluadid as well, respiratory depression concern.  Monitor pulse oxygenation      Chief Complaint: Epigastric pain    History of Present Illness:  Roxanne Moraes is a 74 y.o. male with a PMH of  who presents with S/P liver transplant, hyperlipidemia, diabetes type 2 with long term current use of insulin, chronic kidney disease, anemia secondary to CKD, dementia, and bladder cancer with metastases to the lung who presents to the ED with complaints of generalized epigastric pain, more severe in the right upper quadrant at times. He states that this pain started three days ago suddenly. He says that he has "tried everything" such as pain medications and acetaminophen but nothing is helping. He admits that the dilaudid is the only pain medication that is helping. He says that it feels worse when he sits up and better with laying down. He denies the pain radiating to the back or to the groin area. He states that it is an 8 right now just with sitting there. He says that it is constant and does not come and go. He admits that his appetite has decreased significantly over the past three days and that he is not eating. He states that he is drinking well though. He denies syncope, chest pain, SOB, fever, chills, diarrhea, melena, hematochezia, urgency, frequency, or the inability to urinate. He admits to constipation and that his last bowel movement was two days ago and he is urinating normally. He admits to history of UTI's as well as urinary retention but states this feels different. He denies any alcohol, illicit drug use, or tobacco. He says that he has been clean from all three for the past forty years. Review of Systems:  Review of Systems   Constitutional: Positive for appetite change. Negative for chills and fever. HENT: Negative for ear pain and sore throat. Eyes: Negative for pain and visual disturbance. Respiratory: Negative for cough and shortness of breath. Cardiovascular: Negative for chest pain, palpitations and leg swelling. Gastrointestinal: Positive for abdominal pain and constipation. Negative for diarrhea, nausea and vomiting. Endocrine: Negative for polydipsia, polyphagia and polyuria. Genitourinary: Negative for difficulty urinating, dysuria, frequency, hematuria and urgency. Musculoskeletal: Negative for arthralgias, back pain and myalgias. Skin: Negative for color change, pallor and rash.    Neurological: Negative for dizziness, seizures, syncope and headaches. All other systems reviewed and are negative.       Past Medical and Surgical History:   Past Medical History:   Diagnosis Date   • Acute urinary retention 09/12/2022   • Alcoholism ([address])     "Sober over 20 years"   • Anemia    • Arthritis    • Back pain    • Bladder cancer ([address])    • Bruises easily    • Cerebral artery aneurysm    • Change in mental state     last assessed 5/18/15; resolved 10/27/15   • Chronic kidney disease    • COVID-19 2022 not hospitalized fully recovered   • Diabetes mellitus ([address])    • Difficult intravenous access    • Drug dependence ([address])     "in the past--40yrs ago per sig other"   • Fatigue     last assessed 1/26/15; resolved 5/24/16   • Full dentures    • Hepatitis C     "had treatment"   • History of cancer chemotherapy    • History of radiation therapy    • History of transfusion     per sig other "had blood transfusion when anemic from urinary bleeding"   • Hospital discharge follow-up 12/21/2018   • Hx of liver transplant [facility])    • Hydronephrosis of right kidney    • Insomnia     "after liver transplant"   • Kidney disease    • 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[address])    • Liver cirrhosis ([address])    • Liver transplant recipient [facility])     "at [address]   • Lung cancer [facility])      Hematology/oncology Dr Bill Church   • Port-A-Cath in place    • Renal disorder    • Shortness of breath     "when moving around-started on inhalers"   • Stroke (cerebrum) ([address])    • Stroke ([address])     diff short term memory   • Subdural hygroma     2/27/14; resolved 7/28/15   • Thrombocytopenia ([address]) 09/20/2017   • Uses Danish as primary spoken language        Past Surgical History:   Procedure Laterality Date   • BRAIN SURGERY  02/12/2014    left frontotemporal 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   • CATARACT EXTRACTION     • CHOLECYSTECTOMY     • COLONOSCOPY     • CYSTOSCOPY  11/30/2022    [place]   • FL LUMBAR PUNCTURE DIAGNOSTIC 12/14/2018   • FL RETROGRADE PYELOGRAM  01/18/2023   • FL RETROGRADE PYELOGRAM  4/12/2023   • HEPATITIS B SURFACE AB QN,LIVER TRANSPLANT (HISTORICAL)     • IR BIOPSY LUNG  02/20/2023   • IR CHEST TUBE PLACEMENT  02/25/2023   • IR NEPHROSTOMY TUBE CHECK/CHANGE/REPOSITION/REINSERTION/UPSIZE  07/29/2022   • IR NEPHROSTOMY TUBE CHECK/CHANGE/REPOSITION/REINSERTION/UPSIZE  08/31/2022   • IR NEPHROSTOMY TUBE CHECK/CHANGE/REPOSITION/REINSERTION/UPSIZE  10/07/2022   • IR NEPHROSTOMY TUBE CHECK/CHANGE/REPOSITION/REINSERTION/UPSIZE  12/03/2022   • IR NEPHROSTOMY TUBE PLACEMENT  05/28/2022   • IR PICC LINE  12/14/2018   • IR PORT PLACEMENT  06/23/2022   • LIVER TRANSPLANTATION     • LUNG BIOPSY     • NEPHROSTOMY TUBE CHANGE N/A 01/18/2023    Procedure: Removal of  percutaneous nephroureteral catheter;  Surgeon: Severo Palma MD;  [facility];  Service: Urology   • RI CYSTO BLADDER W/URETERAL CATHETERIZATION Right 01/18/2023    Procedure: URETHRAL DILATION, CYSTOGRAM, CYSTOSCOPY, RIGHT RETEROGRADE PYELOGRAM, [address], [address], COMPLEX CHICAS PLACEMENT;  Surgeon: Severo Palma MD;  [facility];  Service: Urology   • RI CYSTO BLADDER W/URETERAL CATHETERIZATION Right 4/12/2023    Procedure: CYSTO  W/ STENT, urethral dilation with scope;  Surgeon: Severo Palma MD;  [facility];  Service: Urology   • RI CYSTO CALIBRATION DILAT URTL STRIX/STENOSIS N/A 4/12/2023    Procedure: DILATATION URETHRAL;  Surgeon: Severo Palma MD;  [facility];  Service: Urology   • ROTATOR CUFF REPAIR     • SHOULDER SURGERY     • TRANSURETHRAL RESECTION OF BLADDER TUMOR N/A 05/26/2022    Procedure: TRANSURETHRAL RESECTION OF BLADDER TUMOR (TURBT); Surgeon: Severo Palma MD;  [facility]: [facility];  Service: Urology       Meds/Allergies:  Prior to Admission medications    Medication Sig Start Date End Date Taking?  Authorizing Provider   albuterol (Ventolin HFA) 90 mcg/act inhaler Inhale 2 puffs every 6 (six) hours as needed for wheezing or shortness of breath 4/4/23   Brandee Aguilar MD   Blood Glucose Calibration (OT ULTRA/FASTTK CNTRL SOLN) SOLN USE AS DIRECTED 5/16/23   Clarkdale Copper, DO   Comfort EZ Pen Needles 32G X 4 MM MISC USE 3-4 AS DIRECTED 5/17/22   Tete Copper, DO   Continuous Blood Gluc  (FreeStyle Frank 14 Day [place]) NATALIA Use 1 Device continuous  Patient not taking: Reported on 5/30/2023 6/3/22   Tete Copper, DO   Incontinence Supply Disposable (Incontinence Brief Medium) MISC Use 4 (four) times a day 8/19/22 5/4/23  Precious Hollis MD   Lancet Devices (Lancing Device) MISC USE AS DIRECTED 4/11/23   Tete Copper, DO   Levemir FlexTouch 100 units/mL injection pen INJECT 15 UNITS UNDER THE SKIN DAILY AT BEDTIME 6/13/23   Clarkdale Copper, DO   loperamide (IMODIUM) 2 mg capsule Take 1 capsule (2 mg total) by mouth 2 (two) times a day as needed for diarrhea [facility] por cynthia si tiene diarrea 8/26/22   Billy Gomez, DO   Multiple Vitamin (multivitamin) tablet Take 1 tablet by mouth daily  Patient not taking: Reported on 5/4/2023    Historical Provider, MD   OneTouch Ultra test strip TEST 3-4 TIMES A DAY 4/11/23   Clarkdale Copper, DO   oxybutynin (DITROPAN) 5 mg tablet Take 1 tablet (5 mg total) by mouth 2 (two) times a day as needed (bladder spasms) 6/2/23   YAMILKA Newsome   pregabalin (LYRICA) 75 mg capsule Take 1 capsule (75 mg total) by mouth 2 (two) times a day 3/28/23   Tete Copper, DO   rosuvastatin (CRESTOR) 40 MG tablet TAKE ONE (1) TABLET BY MOUTH DAILY 3/14/23   Clarkdale Copper, DO   tacrolimus (PROGRAF) 1 mg capsule TAKE 1 CAPSULE (1 MG TOTAL) BY MOUTH EVERY 12 (TWELVE) HOURS 9/27/22   Clarkdale Copper, DO   tamsulosin (FLOMAX) 0.4 mg Take 1 capsule (0.4 mg total) by mouth daily with dinner 10/24/22 5/30/23  Belita Ganser [place], PA-C   temazepam (RESTORIL) 7.5 mg capsule TAKE 2 CAPSULES (15 MG TOTAL) BY MOUTH DAILY AT BEDTIME AS NEEDED FOR SLEEP 5/5/23 Patricia Rodriguez,    zolpidem (AMBIEN) 5 mg tablet TAKE 1 TABLET (5 MG TOTAL) BY MOUTH DAILY AT BEDTIME AS NEEDED FOR SLEEP 23   Ed Yoon MD     I have reviewed home medications with patient personally. Allergies: Allergies   Allergen Reactions   • Tequin [Gatifloxacin] Rash   • Lipitor [Atorvastatin] Hives and Other (See Comments)     Rash and itching   • Ciprofloxacin Rash   • Iohexol Hives and Other (See Comments)     Contraindication with rosuvastatin. • Iv Contrast [Iodinated Contrast Media] Rash and Other (See Comments)     Unknown   • Levofloxacin Rash       Social History:    Substance Use History:   Social History     Substance and Sexual Activity   Alcohol Use Not Currently     Social History     Tobacco Use   Smoking Status Former   • Packs/day: 1.00   • Years: 35.00   • Total pack years: 35.00   • Types: Cigarettes   • Start date: 63   • Quit date:    • Years since quittin.5   • Passive exposure: Past   Smokeless Tobacco Never   Tobacco Comments    former smoker per [employer]scripts      Social History     Substance and Sexual Activity   Drug Use No    Comment: remotely quit drug use per allscripts        Family History:  Family History   Problem Relation Age of Onset   • Hypertension Mother         benign essential    • Lung cancer Father    • Diabetes Sister    • Cancer Brother    • Stroke Other         cva - due to embolism of cerebra artery        Physical Exam:     Vitals:   Blood Pressure: (!) 180/76 (23 1500)  Pulse: 70 (23 1500)  Temperature: 98.4 °F (36.9 °C) (23 0920)  Temp Source: Tympanic (23 0920)  Respirations: 20 (23 1500)  SpO2: 99 % (23 1500)    Physical Exam  Vitals and nursing note reviewed. Constitutional:       General: He is not in acute distress. Appearance: He is well-developed. He is not ill-appearing or toxic-appearing. Comments: Patient is cachetic    HENT:      Head: Normocephalic and atraumatic.    Eyes: Extraocular Movements: Extraocular movements intact. Conjunctiva/sclera: Conjunctivae normal.   Cardiovascular:      Rate and Rhythm: Normal rate and regular rhythm. Pulses: Normal pulses. Heart sounds: No murmur heard. No gallop. Pulmonary:      Effort: Pulmonary effort is normal. No respiratory distress. Breath sounds: No stridor. Wheezing present. No rhonchi or rales. Abdominal:      General: Bowel sounds are normal.      Palpations: Abdomen is soft. Tenderness: There is abdominal tenderness. There is no right CVA tenderness or left CVA tenderness. Comments: Hyperactive bowel sounds with tenderness to the epigastric region   Musculoskeletal:         General: No swelling. Cervical back: Neck supple. Right lower leg: No edema. Left lower leg: No edema. Skin:     General: Skin is warm and dry. Capillary Refill: Capillary refill takes less than 2 seconds. Findings: Bruising present. Comments: Bruising to the upper bilateral upper extremities   Neurological:      Mental Status: He is alert and oriented to person, place, and time.    Psychiatric:         Mood and Affect: Mood normal.         Additional Data:     Lab Results:  Results from last 7 days   Lab Units 07/24/23  1109   WBC Thousand/uL 5.34   HEMOGLOBIN g/dL 8.4*   HEMATOCRIT % 26.5*   PLATELETS Thousands/uL 67*   NEUTROS PCT % 62   LYMPHS PCT % 22   MONOS PCT % 9   EOS PCT % 5     Results from last 7 days   Lab Units 07/24/23  1109   SODIUM mmol/L 141   POTASSIUM mmol/L 4.5   CHLORIDE mmol/L 115*   CO2 mmol/L 19*   BUN mg/dL 54*   CREATININE mg/dL 3.60*   ANION GAP mmol/L 7   CALCIUM mg/dL 8.8   ALBUMIN g/dL 3.7   TOTAL BILIRUBIN mg/dL 0.24   ALK PHOS U/L 80   ALT U/L 37   AST U/L 53*   GLUCOSE RANDOM mg/dL 92     Results from last 7 days   Lab Units 07/24/23  1109   INR  1.10             Results from last 7 days   Lab Units 07/24/23  1109   LACTIC ACID mmol/L 1.5   PROCALCITONIN ng/ml 0.13 Lines/Drains:  Invasive Devices     Central Venous Catheter Line  Duration           Port A Cath 06/23/22 Right Chest 396 days          Peripheral Intravenous Line  Duration           Peripheral IV 07/24/23 Left Antecubital <1 day          Drain  Duration           Ureteral Internal Stent Right ureter 6 Fr. 187 days                Central Line:    XR chest 2 views   ED Interpretation by Esther Cardenas DO (07/24 1442)   No acute bony abnormality. Evidence of old R-sided rib trauma again seen as on previous scan. R upper lobe opacity similar to prior study seen. Lower lobe opacity seen on lateral view of uncertain cause not seen on previous study. Final Result by Isidro Tay MD (07/24 8716)      Bilateral lung metastases with no acute disease. Workstation performed: [id number]         CT abdomen pelvis wo contrast   Final Result by Meenu Olguin MD (07/24 1323)      1. No acute abnormality in the abdomen or pelvis. 2. Right double-J nephroureteral stent in place with mild right hydroureteronephrosis, slightly decreased since the prior study. Workstation performed: [id number]                 ** Please Note: This note has been constructed using a voice recognition system.  **

## 2023-07-24 NOTE — ASSESSMENT & PLAN NOTE
• Patient presented to the ED with three days of generalized abdominal pain, nausea, and poor oral intake. • Upon presentation the patient CBC was found to have low hemoglobin. .. CMP had elevated AST with normal alk phos, and increase in Creatinine   • Lactic acid was 1.5, with preliminary growth of blood cultures having no growth  • Coag studies, procalcitonin, ammonia, tropinin were unremarkable  • UA showed small leukocytes and blood, urine microscopic WBC were 4-10. Frann Alfaro no antibiotic treatment necessary  • CT abdomen pelvis w/o contrast showed no acute abnormalities but there was right double J nephroureteral stent in place with mildright hydroureteronephrosis which is decreased from last study  • XR of the chest showed bilateral lung metastases with no acute disease  • US of abdomen was negative for pathologic processes  • Will continue dilaudid injection 0.5 mg every 4 hours PRN    • On exam, significant epigastric tenderness. No blood in stool reported. • Ordered Maalox, protonix BID, non ulcerogenic diet.    • Place on obs for pain control

## 2023-07-24 NOTE — ED PROVIDER NOTES
History  Chief Complaint   Patient presents with   • Abdominal Pain     Pt c/o abdominal pain, nausea and loss of appetite x3 days - hx of lung cancer, not currently undergoing treatment. Pt denies fevers, V/D at home. Pt also complains of CP, SOB, fatigue and chills. 58-year-old male with history of liver transplant, lung and bladder cancers, diabetes, CKD presents to the ED with 3 days of worsening abdominal pain, nausea, decreased appetite. Family member is at bedside and assists with translation and history. He has also had associated chills and fatigue. He does have a port but is not currently undergoing any treatment for lung cancer. He takes tacrolimus. He has no recent changes to medications and takes all of his medications as prescribed. He has not been around anyone with similar symptoms. Prior to Admission Medications   Prescriptions Last Dose Informant Patient Reported? Taking?    Blood Glucose Calibration (OT ULTRA/FASTTK CNTRL SOLN) SOLN   No No   Sig: USE AS DIRECTED   Comfort EZ Pen Needles 32G X 4 MM MISC  Spouse/Significant Other No No   Sig: USE 3-4 AS DIRECTED   Continuous Blood Gluc  (FreeStyle Frank 14 Day [place]) NATALIA  Spouse/Significant Other No No   Sig: Use 1 Device continuous   Patient not taking: Reported on 5/30/2023   Incontinence Supply Disposable (Incontinence Brief Medium) MISC  Spouse/Significant Other No No   Sig: Use 4 (four) times a day   Lancet Devices (Lancing Device) MISC   No No   Sig: USE AS DIRECTED   Levemir FlexTouch 100 units/mL injection pen   No No   Sig: INJECT 15 UNITS UNDER THE SKIN DAILY AT BEDTIME   Multiple Vitamin (multivitamin) tablet   Yes No   Sig: Take 1 tablet by mouth daily   Patient not taking: Reported on 5/4/2023   OneTouch Ultra test strip   No No   Sig: TEST 3-4 TIMES A DAY   albuterol (Ventolin HFA) 90 mcg/act inhaler   No No   Sig: Inhale 2 puffs every 6 (six) hours as needed for wheezing or shortness of breath   loperamide (IMODIUM) 2 mg capsule  Spouse/Significant Other No No   Sig: Take 1 capsule (2 mg total) by mouth 2 (two) times a day as needed for diarrhea Garland carmen tableta dos veces por cynthia si tiene diarrea   oxybutynin (DITROPAN) 5 mg tablet   No No   Sig: Take 1 tablet (5 mg total) by mouth 2 (two) times a day as needed (bladder spasms)   pregabalin (LYRICA) 75 mg capsule   No No   Sig: Take 1 capsule (75 mg total) by mouth 2 (two) times a day   rosuvastatin (CRESTOR) 40 MG tablet  Spouse/Significant Other No No   Sig: TAKE ONE (1) TABLET BY MOUTH DAILY   tacrolimus (PROGRAF) 1 mg capsule  Spouse/Significant Other No No   Sig: TAKE 1 CAPSULE (1 MG TOTAL) BY MOUTH EVERY 12 (TWELVE) HOURS   tamsulosin (FLOMAX) 0.4 mg  Spouse/Significant Other No No   Sig: Take 1 capsule (0.4 mg total) by mouth daily with dinner   temazepam (RESTORIL) 7.5 mg capsule   No No   Sig: TAKE 2 CAPSULES (15 MG TOTAL) BY MOUTH DAILY AT BEDTIME AS NEEDED FOR SLEEP   zolpidem (AMBIEN) 5 mg tablet   No No   Sig: TAKE 1 TABLET (5 MG TOTAL) BY MOUTH DAILY AT BEDTIME AS NEEDED FOR SLEEP      Facility-Administered Medications: None       Past Medical History:   Diagnosis Date   • Acute urinary retention 09/12/2022   • Alcoholism ([address])     "Sober over 20 years"   • Anemia    • Arthritis    • Back pain    • Bladder cancer (HCC)    • Bruises easily    • Cerebral artery aneurysm    • Change in mental state     last assessed 5/18/15; resolved 10/27/15   • Chronic kidney disease    • COVID-19 2022 not hospitalized fully recovered   • Diabetes mellitus ([address])    • Difficult intravenous access    • Drug dependence ([address])     "in the past--40yrs ago per sig other"   • Fatigue     last assessed 1/26/15; resolved 5/24/16   • Full dentures    • Hepatitis C     "had treatment"   • History of cancer chemotherapy    • History of radiation therapy    • History of transfusion     per sig other "had blood transfusion when anemic from urinary bleeding"   • Hospital discharge follow-up 12/21/2018   • Hx of liver transplant ([address])    • Hydronephrosis of right kidney    • Insomnia     "after liver transplant"   • Kidney disease    • 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[address])    • Liver cirrhosis ([address])    • Liver transplant recipient [facility])     "at [address]   • Lung cancer [facility])      Hematology/oncology Dr Lopez Day   • Port-A-Cath in place    • Renal disorder    • Shortness of breath     "when moving around-started on inhalers"   • Stroke (cerebrum) ([address])    • Stroke ([address])     diff short term memory   • Subdural hygroma     2/27/14; resolved 7/28/15   • Thrombocytopenia ([address]) 09/20/2017   • Uses Frisian as primary spoken language        Past Surgical History:   Procedure Laterality Date   • BRAIN SURGERY  02/12/2014    left frontotemporal 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   • CATARACT EXTRACTION     • CHOLECYSTECTOMY     • COLONOSCOPY     • CYSTOSCOPY  11/30/2022    [place]   • FL LUMBAR PUNCTURE DIAGNOSTIC  12/14/2018   • FL RETROGRADE PYELOGRAM  01/18/2023   • FL RETROGRADE PYELOGRAM  4/12/2023   • HEPATITIS B SURFACE AB QN,LIVER TRANSPLANT (HISTORICAL)     • IR BIOPSY LUNG  02/20/2023   • IR CHEST TUBE PLACEMENT  02/25/2023   • IR NEPHROSTOMY TUBE CHECK/CHANGE/REPOSITION/REINSERTION/UPSIZE  07/29/2022   • IR NEPHROSTOMY TUBE CHECK/CHANGE/REPOSITION/REINSERTION/UPSIZE  08/31/2022   • IR NEPHROSTOMY TUBE CHECK/CHANGE/REPOSITION/REINSERTION/UPSIZE  10/07/2022   • IR NEPHROSTOMY TUBE CHECK/CHANGE/REPOSITION/REINSERTION/UPSIZE  12/03/2022   • IR NEPHROSTOMY TUBE PLACEMENT  05/28/2022   • IR PICC LINE  12/14/2018   • IR PORT PLACEMENT  06/23/2022   • LIVER TRANSPLANTATION     • LUNG BIOPSY     • NEPHROSTOMY TUBE CHANGE N/A 01/18/2023    Procedure: Removal of  percutaneous nephroureteral catheter;  Surgeon: Nora Delgadillo MD;  [facility];  Service: Urology   • TN CYSTO BLADDER W/URETERAL CATHETERIZATION Right 01/18/2023    Procedure: URETHRAL DILATION, CYSTOGRAM, CYSTOSCOPY, RIGHT RETEROGRADE PYELOGRAM, STENT PLACEMENT, COMPLEX CHICAS PLACEMENT;  Surgeon: Hair Reese MD;  [facility];  Service: Urology   • NH CYSTO BLADDER W/URETERAL CATHETERIZATION Right 2023    Procedure: CYSTO  W/ STENT, urethral dilation with scope;  Surgeon: Hair Reese MD;  [facility];  Service: Urology   • NH CYSTO CALIBRATION DILAT URTL STRIX/STENOSIS N/A 2023    Procedure: DILATATION URETHRAL;  Surgeon: Hair Reese MD;  [facility];  Service: Urology   • ROTATOR CUFF REPAIR     • SHOULDER SURGERY     • TRANSURETHRAL RESECTION OF BLADDER TUMOR N/A 2022    Procedure: TRANSURETHRAL RESECTION OF BLADDER TUMOR (TURBT); Surgeon: Hair Reese MD;  [facility];  Service: Urology       Family History   Problem Relation Age of Onset   • Hypertension Mother         benign essential    • Lung cancer Father    • Diabetes Sister    • Cancer Brother    • Stroke Other         cva - due to embolism of cerebra artery      I have reviewed and agree with the history as documented. E-Cigarette/Vaping   • E-Cigarette Use Never User      E-Cigarette/Vaping Substances   • Nicotine No    • THC No    • CBD No    • Flavoring No    • Other No    • Unknown No      Social History     Tobacco Use   • Smoking status: Former     Packs/day: 1.00     Years: 35.00     Total pack years: 35.00     Types: Cigarettes     Start date: 63     Quit date:      Years since quittin.5     Passive exposure: Past   • Smokeless tobacco: Never   • Tobacco comments:     former smoker per allscripts    Vaping Use   • Vaping Use: Never used   Substance Use Topics   • Alcohol use: Not Currently   • Drug use: No     Comment: remotely quit drug use per allscripts         Review of Systems   Constitutional: Positive for chills and fatigue. Negative for fever. HENT: Negative for congestion, rhinorrhea and sore throat. Eyes: Negative for visual disturbance. Respiratory: Positive for shortness of breath. Negative for cough. Cardiovascular: Negative for chest pain. Gastrointestinal: Positive for abdominal pain and nausea. Negative for blood in stool, constipation, diarrhea and vomiting. Genitourinary: Negative for decreased urine volume, difficulty urinating, dysuria and hematuria. Skin: Negative for rash. Neurological: Negative for dizziness, light-headedness and headaches. Physical Exam  ED Triage Vitals [07/24/23 0920]   Temperature Pulse Respirations Blood Pressure SpO2   98.4 °F (36.9 °C) 64 20 160/82 100 %      Temp Source Heart Rate Source Patient Position - Orthostatic VS BP Location FiO2 (%)   Tympanic Monitor Lying Left arm --      Pain Score       8             Orthostatic Vital Signs  Vitals:    07/24/23 1222 07/24/23 1400 07/24/23 1500 07/24/23 1630   BP: 164/73 163/72 (!) 180/76 165/77   Pulse: 66 58 70 60   Patient Position - Orthostatic VS:           Physical Exam  Constitutional:       General: He is in acute distress. Appearance: He is ill-appearing. He is not toxic-appearing. HENT:      Head: Normocephalic and atraumatic. Mouth/Throat:      Mouth: Mucous membranes are moist.      Pharynx: Oropharynx is clear. Eyes:      General: No scleral icterus. Extraocular Movements: Extraocular movements intact. Conjunctiva/sclera: Conjunctivae normal.   Cardiovascular:      Rate and Rhythm: Normal rate and regular rhythm. Pulses: Normal pulses. Pulmonary:      Effort: Pulmonary effort is normal.      Breath sounds: Normal breath sounds. Abdominal:      General: There is no distension. Palpations: Abdomen is soft. Tenderness: There is abdominal tenderness (Diffuse but most severe in RUQ). There is no guarding or rebound. Musculoskeletal:      Cervical back: Normal range of motion and neck supple. Right lower leg: No edema. Left lower leg: No edema. Skin:     General: Skin is warm and dry. Capillary Refill: Capillary refill takes 2 to 3 seconds. Neurological:      Mental Status: He is alert and oriented to person, place, and time. Mental status is at baseline. Psychiatric:         Mood and Affect: Mood normal.         Behavior: Behavior normal.         Thought Content:  Thought content normal.         ED Medications  Medications   HYDROmorphone (DILAUDID) injection 0.5 mg (0.5 mg Intravenous Given 7/24/23 1711)   albuterol (PROVENTIL HFA,VENTOLIN HFA) inhaler 2 puff (has no administration in time range)   pregabalin (LYRICA) capsule 75 mg (has no administration in time range)   atorvastatin (LIPITOR) tablet 40 mg (has no administration in time range)   tamsulosin (FLOMAX) capsule 0.4 mg (has no administration in time range)   tacrolimus (PROGRAF) capsule 1 mg (has no administration in time range)   temazepam (RESTORIL) capsule 15 mg (has no administration in time range)   insulin detemir (LEVEMIR) subcutaneous injection 10 Units (has no administration in time range)   pantoprazole (PROTONIX) EC tablet 40 mg (has no administration in time range)   zolpidem (AMBIEN) tablet 5 mg (has no administration in time range)   insulin lispro (HumaLOG) 100 units/mL subcutaneous injection 1-6 Units (has no administration in time range)   ondansetron (ZOFRAN) injection 4 mg (has no administration in time range)   aluminum-magnesium hydroxide-simethicone (MAALOX) oral suspension 30 mL (has no administration in time range)   HYDROmorphone (DILAUDID) injection 0.5 mg (0.5 mg Intravenous Given 7/24/23 1106)   sodium chloride 0.9 % bolus 1,000 mL (0 mL Intravenous Stopped 7/24/23 1223)       Diagnostic Studies  Results Reviewed     Procedure Component Value Units Date/Time    Lipase [[id number]]     Lab Status: No result Specimen: Blood     HS Troponin I 2hr [[id number]]  (Normal) Collected: 07/24/23 1322    Lab Status: Final result Specimen: Blood from Arm, Left Updated: 07/24/23 1402     hs TnI 2hr 8 ng/L      Delta 2hr hsTnI -2 ng/L     Blood culture #1 [[id number]] Collected: 07/24/23 1127    Lab Status: Preliminary result Specimen: Blood from Arm, Right Updated: 07/24/23 1401     Blood Culture Received in Microbiology Lab. Culture in Progress. Blood culture #2 [[id number]] Collected: 07/24/23 1109    Lab Status: Preliminary result Specimen: Blood from Arm, Left Updated: 07/24/23 1401     Blood Culture Received in Microbiology Lab. Culture in Progress.     Urine Microscopic [[id number]]  (Abnormal) Collected: 07/24/23 1231    Lab Status: Final result Specimen: Urine, Clean Catch Updated: 07/24/23 1347     RBC, UA 1-2 /hpf      WBC, UA 4-10 /hpf      Epithelial Cells None Seen /hpf      Bacteria, UA None Seen /hpf     UA w Reflex to Microscopic w Reflex to Culture [[id number]]  (Abnormal) Collected: 07/24/23 1231    Lab Status: Final result Specimen: Urine, Clean Catch Updated: 07/24/23 1304     Color, UA Colorless     Clarity, UA Clear     Specific Gravity, UA 1.010     pH, UA 7.5     Leukocytes, UA Small     Nitrite, UA Negative     Protein, UA 30 (1+) mg/dl      Glucose, UA Negative mg/dl      Ketones, UA Negative mg/dl      Urobilinogen, UA <2.0 mg/dl      Bilirubin, UA Negative     Occult Blood, UA Small    Procalcitonin [[id number]]  (Normal) Collected: 07/24/23 1109    Lab Status: Final result Specimen: Blood from Arm, Left Updated: 07/24/23 1206     Procalcitonin 0.13 ng/ml     Comprehensive metabolic panel [[id number]]  (Abnormal) Collected: 07/24/23 1109    Lab Status: Final result Specimen: Blood from Arm, Left Updated: 07/24/23 1203     Sodium 141 mmol/L      Potassium 4.5 mmol/L      Chloride 115 mmol/L      CO2 19 mmol/L      ANION GAP 7 mmol/L      BUN 54 mg/dL      Creatinine 3.60 mg/dL      Glucose 92 mg/dL      Calcium 8.8 mg/dL      AST 53 U/L      ALT 37 U/L      Alkaline Phosphatase 80 U/L      Total Protein 8.8 g/dL      Albumin 3.7 g/dL      Total Bilirubin 0.24 mg/dL      eGFR 15 ml/min/1.73sq m     Narrative:      [facility] guidelines for Chronic Kidney Disease (CKD):   •  Stage 1 with normal or high GFR (GFR > 90 mL/min/1.73 square meters)  •  Stage 2 Mild CKD (GFR = 60-89 mL/min/1.73 square meters)  •  Stage 3A Moderate CKD (GFR = 45-59 mL/min/1.73 square meters)  •  Stage 3B Moderate CKD (GFR = 30-44 mL/min/1.73 square meters)  •  Stage 4 Severe CKD (GFR = 15-29 mL/min/1.73 square meters)  •  Stage 5 End Stage CKD (GFR <15 mL/min/1.73 square meters)  Note: GFR calculation is accurate only with a steady state creatinine    HS Troponin 0hr (reflex protocol) [[id number]]  (Normal) Collected: 07/24/23 1109    Lab Status: Final result Specimen: Blood from Arm, Left Updated: 07/24/23 1203     hs TnI 0hr 10 ng/L     Protime-INR [[id number]]  (Normal) Collected: 07/24/23 1109    Lab Status: Final result Specimen: Blood from Arm, Left Updated: 07/24/23 1159     Protime 14.4 seconds      INR 1.10    APTT [[id number]]  (Normal) Collected: 07/24/23 1109    Lab Status: Final result Specimen: Blood from Arm, Left Updated: 07/24/23 1159     PTT 30 seconds     Lactic acid, plasma (w/reflex if result > 2.0) [[id number]]  (Normal) Collected: 07/24/23 1109    Lab Status: Final result Specimen: Blood from Arm, Left Updated: 07/24/23 1156     LACTIC ACID 1.5 mmol/L     Narrative:      Result may be elevated if tourniquet was used during collection.     Ammonia [[id number]]  (Normal) Collected: 07/24/23 1109    Lab Status: Final result Specimen: Blood from Arm, Left Updated: 07/24/23 1153     Ammonia 13 umol/L     CBC and differential [[id number]]  (Abnormal) Collected: 07/24/23 1109    Lab Status: Final result Specimen: Blood from Arm, Left Updated: 07/24/23 1152     WBC 5.34 Thousand/uL      RBC 3.45 Million/uL      Hemoglobin 8.4 g/dL      Hematocrit 26.5 %      MCV 77 fL      MCH 24.3 pg      MCHC 31.7 g/dL      RDW 16.6 %      Platelets 67 Thousands/uL      nRBC 0 /100 WBCs      Neutrophils Relative 62 %      Immat GRANS % 1 %      Lymphocytes Relative 22 %      Monocytes Relative 9 %      Eosinophils Relative 5 %      Basophils Relative 1 %      Neutrophils Absolute 3.39 Thousands/µL      Immature Grans Absolute 0.03 Thousand/uL      Lymphocytes Absolute 1.15 Thousands/µL      Monocytes Absolute 0.46 Thousand/µL      Eosinophils Absolute 0.28 Thousand/µL      Basophils Absolute 0.03 Thousands/µL     Tacrolimus level [[id number]] Collected: 07/24/23 1109    Lab Status: In process Specimen: Blood from Arm, Left Updated: 07/24/23 1133                 XR chest 2 views   ED Interpretation by Kike Jones DO (07/24 1442)   No acute bony abnormality. Evidence of old R-sided rib trauma again seen as on previous scan. R upper lobe opacity similar to prior study seen. Lower lobe opacity seen on lateral view of uncertain cause not seen on previous study. Final Result by Darylene Gentles, MD (07/24 1501)      Bilateral lung metastases with no acute disease. Workstation performed: [id number]         CT abdomen pelvis wo contrast   Final Result by Gerhardt [place], MD (07/24 1323)      1. No acute abnormality in the abdomen or pelvis. 2. Right double-J nephroureteral stent in place with mild right hydroureteronephrosis, slightly decreased since the prior study.             Workstation performed: [id number]               Procedures  US Guided Peripheral IV    Date/Time: 7/24/2023 12:04 PM    Performed by: Kike Jones DO  Authorized by: Kike Jones DO    Patient location:  ED  Performed by:  Resident  Other Assisting Provider: Yes (comment) (Dr. Marita Abdul)    Indications:     Indications: difficulty obtaining IV access and patient request      Image availability:  Images available in PACS  Procedure details:     Patient evaluated for contraindications to access (i.e. fistula, thrombosis, etc): Yes      Standard clean technique used for ultrasound access: Yes      Location:  Left arm    Catheter size:  20 gauge    Number of attempts:  1    Successful placement: yes    Post-procedure details:     Post-procedure:  Dressing applied    Assessment: free fluid flow and no signs of infiltration      Post-procedure complications: none      Patient tolerance of procedure: Tolerated well, no immediate complications          ED Course  ED Course as of 07/24/23 1755   Mon Jul 24, 2023   2955 ECG 12 lead  EKG personally interpreted. Significant artifact present but appears normal sinus rhythm with rate of 66. Normal axis. Normal pr, QT intervals. Normal QRS width. No DAWN or t wave changes. Appears similar to study done Feb 2023. 1115 Two lines IV lines secured by ultrasound. 1158 CBC and differential(!)  Improved microcytic anemia and thrombocytopenia from previous labs. Perhaps hemoconcentration secondary to dehydration. 1159 Ammonia: 13  Within normal limits   1159 LACTIC ACID: 1.5  1.5   1227 Comprehensive metabolic panel(!)  Electrolytes and kidney function appear stable. LFTs with some elevation of AST, otherwise normal.   1235 Reassessed patient. He says his pain is much improved after the dilaudid IV 0.5 mg.   1315 UA w Reflex to Microscopic w Reflex to Culture(!)  Leukocytes, blood present. Will f/u microscopic examination. 1315 Procalcitonin: 0.13  Unremarkable   1316 HS Troponin 0hr (reflex protocol)  Will trend at 2 hours   1316 APTT   1316 Protime-INR  Coagulation studies within normal limits   1423 Urine Microscopic(!)  WBC. No bacteria. 1423 HS Troponin I 2hr  Delta -2   1424 CT abdomen pelvis wo contrast  No acute abnormality   1442 XR chest 2 views  No obvious acute abnormality. [address] patient. Discussed reassuring workup. He is feeling much better after fluids and dilaudid. Recommended to him that he be observed in hospital at least overnight given complex medical history and he is in agreement.  Will contact admitting team.   9858 Discussed case with hospitalist who agrees to place patient in observation. Identification of Seniors at [facility] Most Recent Value   (ISAR) Identification of Seniors at Risk    Before the illness or injury that brought you to the Emergency, did you need someone to help you on a regular basis? 1 Filed at: 07/24/2023 4432   In the last 24 hours, have you needed more help than usual? 1 Filed at: 07/24/2023 5038   Have you been hospitalized for one or more nights during the past 6 months? 1 Filed at: 07/24/2023 5528   In general, do you see well? 0 Filed at: 07/24/2023 4789   In general, do you have serious problems with your memory? 0 Filed at: 07/24/2023 0795   Do you take more than three different medications every day? 1 Filed at: 07/24/2023 4654   ISAR Score 4 Filed at: 07/24/2023 4191                    SBIRT 20yo+    Flowsheet Row Most Recent Value   Initial Alcohol Screen: US AUDIT-C     1. How often do you have a drink containing alcohol? 0 Filed at: 07/24/2023 0927   2. How many drinks containing alcohol do you have on a typical day you are drinking? 0 Filed at: 07/24/2023 0927   3b. FEMALE Any Age, or MALE 65+: How often do you have 4 or more drinks on one occassion? 0 Filed at: 07/24/2023 2609   Audit-C Score 0 Filed at: 07/24/2023 1125   VERNELL: How many times in the past year have you. .. Used an illegal drug or used a prescription medication for non-medical reasons? Never Filed at: 07/24/2023 5323                Medical Decision Making  75 yo male liver transplant patient and history of lung and bladder cancers with RUQ abdominal pain, nausea, decreased appetite, chills. Concern for sepsis although pulse and temperature are within normal limits. Significant tenderness on abdominal exam especially RUQ but no concern for peritonitis at this time. I will initiate septic workup including CBC, CMP, lactate, procal, UA, CXR. I will also r/o ACS.  I will also check his tacrolimus level. I will also get a CT scan of abdomen and pelvis. Patient has contrast allergy. Will also give fluids for suspected dehydration and attempt to control pain. Patient will likely need admission. Please see ED course for additional MDM and information. Amount and/or Complexity of Data Reviewed  Labs: ordered. Decision-making details documented in ED Course. Radiology: ordered. ECG/medicine tests:  Decision-making details documented in ED Course. Risk  Prescription drug management. Disposition  Final diagnoses:   Generalized abdominal pain     Time reflects when diagnosis was documented in both MDM as applicable and the Disposition within this note     Time User Action Codes Description Comment    7/24/2023  3:53 PM Sharif Dickens Dates Add [R10.84] Generalized abdominal pain       ED Disposition     ED Disposition   Admit    Condition   Stable    Date/Time   Mon Jul 24, 2023  3:55 PM    Comment   Case was discussed with Dr. Padmini Stern and the patient's admission status was agreed to be Admission Status: observation status to the service of Dr. Padmini Stern . Follow-up Information    None         Patient's Medications   Discharge Prescriptions    No medications on file     No discharge procedures on file. PDMP Review       Value Time User    PDMP Reviewed  Yes 6/20/2023  4:49 PM Nidhi Ulloa MD           ED Provider  Attending physically available and evaluated Marjorie Hernandez. I managed the patient along with the ED Attending.     Electronically Signed by         Alfred Gallardo DO  07/24/23 3712

## 2023-07-24 NOTE — ASSESSMENT & PLAN NOTE
Lab Results   Component Value Date    EGFR 15 07/24/2023    EGFR 12 07/12/2023    EGFR 15 06/07/2023    CREATININE 3.60 (H) 07/24/2023    CREATININE 4.22 (H) 07/12/2023    CREATININE 3.70 (H) 06/07/2023     • Creatinine today: 3.60, GFR 15, BUN 54  • Close to his baseline around 3.10-3.30  • Received 1L Bolus in ED, monitor

## 2023-07-24 NOTE — ASSESSMENT & PLAN NOTE
• Hemoglobin was 8.4 today, continue to monitor with CBC in the AM  • May need future type and screen if hemoglobin drops below 8, transfuse below 7  • Secondary to Stage 4 CKD

## 2023-07-24 NOTE — ASSESSMENT & PLAN NOTE
Patient reports taking both temazepam and Ambien for insomnia for over 30 years. Confirmed with PDMP.

## 2023-07-24 NOTE — ASSESSMENT & PLAN NOTE
Lab Results   Component Value Date    HGBA1C 7.3 (H) 05/03/2023       No results for input(s): "POCGLU" in the last 72 hours. Blood Sugar Average: Last 72 hrs:  Patient reports he is currently taking Lantus 15 units at bedtime, reduce it to 10 units at bedtime with sliding scale.

## 2023-07-24 NOTE — H&P
[address]  H&P  Name: Mercedes Garcia 74 y.o. male I MRN: [id number]  Unit/Bed#: QCE I Date of Admission: 7/24/2023   Date of Service: 7/24/2023 I Hospital Day: 0      Assessment/Plan   * Generalized abdominal pain  Assessment & Plan  • Patient presented to the ED with three days of generalized abdominal pain, nausea, and poor oral intake. • Upon presentation the patient CBC was found to have low hemoglobin. .. CMP had elevated AST with normal alk phos, and increase in Creatinine   • Lactic acid was 1.5, with preliminary growth of blood cultures having no growth  • Coag studies, procalcitonin, ammonia, tropinin were unremarkable  • UA showed small leukocytes and blood, urine microscopic WBC were 4-10. Theodor Points no antibiotic treatment necessary  • CT abdomen pelvis w/o contrast showed no acute abnormalities but there was right double J nephroureteral stent in place with mildright hydroureteronephrosis which is decreased from last study  • XR of the chest showed bilateral lung metastases with no acute disease  • US of abdomen was negative for pathologic processes  • Will continue dilaudid injection 0.5 mg every 4 hours PRN    • On exam, significant epigastric tenderness. No blood in stool reported. • Ordered Maalox, protonix BID, non ulcerogenic diet.    • Place on obs for pain control    CKD (chronic kidney disease)  Assessment & Plan  Lab Results   Component Value Date    EGFR 15 07/24/2023    EGFR 12 07/12/2023    EGFR 15 06/07/2023    CREATININE 3.60 (H) 07/24/2023    CREATININE 4.22 (H) 07/12/2023    CREATININE 3.70 (H) 06/07/2023     • Creatinine today: 3.60, GFR 15, BUN 54  • Close to his baseline around 3.10-3.30  • Received 1L Bolus in ED, monitor    Bladder cancer 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 [facility])  Assessment & Plan  • Known history of bladder tumor status post resection and radiation  • Follow-up with Urology  • Patient with right-sided nephrostomy tube  • Patient present with port cath as well, no current treatment is being completed  • Follow up outpatient with hematology-oncology    Insomnia  Assessment & Plan  Patient reports taking both temazepam and Ambien for insomnia for over 30 years. Confirmed with PDMP. History of liver transplant [facility])  Assessment & Plan  • H/o liver transplant secondary to alcoholic cirrhosis  • Continue with Prograf  • Check Prograf level     Controlled type 2 diabetes mellitus with diabetic neuropathy, with long-term current use of insulin (HCC)  Assessment & Plan  Lab Results   Component Value Date    HGBA1C 7.3 (H) 05/03/2023       No results for input(s): "POCGLU" in the last 72 hours. Blood Sugar Average: Last 72 hrs:  Patient reports he is currently taking Lantus 15 units at bedtime, reduce it to 10 units at bedtime with sliding scale. Anemia due to chronic kidney disease  Assessment & Plan  • Hemoglobin was 8.4 today, continue to monitor with CBC in the AM  • May need future type and screen if hemoglobin drops below 8, transfuse below 7  • Secondary to Stage 4 CKD    Thrombocytopenia [facility])  Assessment & Plan  • Monitor for now with CBC in the AM  • At baseline as of 7/24  • Hold SQ heparin        VTE Prophylaxis: Pharmacologic VTE Prophylaxis contraindicated due to Thrombocytopenia  / sequential compression device   Code Status: Full code       Anticipated Length of Stay:  Patient will be admitted on an Observation basis with an anticipated length of stay of  < 2 midnights. Justification for Hospital Stay: epigastric pain    Total Time for Visit, including Counseling / Coordination of Care: 60 minutes. Greater than 50% of this total time spent on direct patient counseling and coordination of care.     Chief Complaint:   abdominal pain    History of Present Illness:    Nati Huntley is a 74 y.o. male with a PMH of  who presents with S/P liver transplant, hyperlipidemia, diabetes type 2 with long term current use of insulin, chronic kidney disease, anemia secondary to CKD, dementia, and bladder cancer with metastases to the lung who presents to the ED with complaints of generalized epigastric pain, more severe in the right upper quadrant at times. He states that this pain started three days ago suddenly. He says that he has "tried everything" such as pain medications and acetaminophen but nothing is helping. He admits that the dilaudid is the only pain medication that is helping. He says that it feels worse when he sits up and better with laying down. He denies the pain radiating to the back or to the groin area. He states that it is an 8 right now just with sitting there. He says that it is constant and does not come and go. He admits that his appetite has decreased significantly over the past three days and that he is not eating. He states that he is drinking well though. He denies syncope, chest pain, SOB, fever, chills, diarrhea, melena, hematochezia, urgency, frequency, or the inability to urinate. He admits to constipation and that his last bowel movement was two days ago and he is urinating normally. He admits to history of UTI's as well as urinary retention but states this feels different. He denies any alcohol, illicit drug use, or tobacco. He says that he has been clean from all three for the past forty years. In ED, HD stable. CT abdomen/pelvis shows no acute abnormalities. patient will be admitted for pain control    Review of Systems:    Review of Systems   Constitutional: Negative for chills and fever. HENT: Negative for ear pain and sore throat. Eyes: Negative for pain and visual disturbance. Respiratory: Negative for cough and shortness of breath. Cardiovascular: Negative for chest pain and palpitations. Gastrointestinal: Positive for abdominal pain. Negative for vomiting. Genitourinary: Negative for dysuria and hematuria. Musculoskeletal: Negative for arthralgias and back pain. Skin: Negative for color change and rash. Neurological: Negative for seizures and syncope. All other systems reviewed and are negative.       Past Medical and Surgical History:     Past Medical History:   Diagnosis Date   • Acute urinary retention 09/12/2022   • Alcoholism ([address])     "Sober over 20 years"   • Anemia    • Arthritis    • Back pain    • Bladder cancer ([address])    • Bruises easily    • Cerebral artery aneurysm    • Change in mental state     last assessed 5/18/15; resolved 10/27/15   • Chronic kidney disease    • COVID-19 2022 not hospitalized fully recovered   • Diabetes mellitus ([address])    • Difficult intravenous access    • Drug dependence ([address])     "in the past--40yrs ago per sig other"   • Fatigue     last assessed 1/26/15; resolved 5/24/16   • Full dentures    • Hepatitis C     "had treatment"   • History of cancer chemotherapy    • History of radiation therapy    • History of transfusion     per sig other "had blood transfusion when anemic from urinary bleeding"   • Hospital discharge follow-up 12/21/2018   • Hx of liver transplant [facility])    • Hydronephrosis of right kidney    • Insomnia     "after liver transplant"   • Kidney disease    • 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[address])    • Liver cirrhosis ([address])    • Liver transplant recipient [facility])     "at [address]   • Lung cancer [facility])      Hematology/oncology Dr Nubia Hughes   • Port-A-Cath in place    • Renal disorder    • Shortness of breath     "when moving around-started on inhalers"   • Stroke (cerebrum) ([address])    • Stroke ([address])     diff short term memory   • Subdural hygroma     2/27/14; resolved 7/28/15   • Thrombocytopenia ([address]) 09/20/2017   • Uses Syrian as primary spoken language        Past Surgical History:   Procedure Laterality Date   • BRAIN SURGERY  02/12/2014    left frontotemporal 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   • CATARACT EXTRACTION     • CHOLECYSTECTOMY     • COLONOSCOPY     • CYSTOSCOPY  11/30/2022    [place]   • FL LUMBAR PUNCTURE DIAGNOSTIC  12/14/2018   • FL RETROGRADE PYELOGRAM  01/18/2023   • FL RETROGRADE PYELOGRAM  4/12/2023   • HEPATITIS B SURFACE AB QN,LIVER TRANSPLANT (HISTORICAL)     • IR BIOPSY LUNG  02/20/2023   • IR CHEST TUBE PLACEMENT  02/25/2023   • IR NEPHROSTOMY TUBE CHECK/CHANGE/REPOSITION/REINSERTION/UPSIZE  07/29/2022   • IR NEPHROSTOMY TUBE CHECK/CHANGE/REPOSITION/REINSERTION/UPSIZE  08/31/2022   • IR NEPHROSTOMY TUBE CHECK/CHANGE/REPOSITION/REINSERTION/UPSIZE  10/07/2022   • IR NEPHROSTOMY TUBE CHECK/CHANGE/REPOSITION/REINSERTION/UPSIZE  12/03/2022   • IR NEPHROSTOMY TUBE PLACEMENT  05/28/2022   • IR PICC LINE  12/14/2018   • IR PORT PLACEMENT  06/23/2022   • LIVER TRANSPLANTATION     • LUNG BIOPSY     • NEPHROSTOMY TUBE CHANGE N/A 01/18/2023    Procedure: Removal of  percutaneous nephroureteral catheter;  Surgeon: Nehemiah Cardenas MD;  [facility];  Service: Urology   • SD CYSTO BLADDER W/URETERAL CATHETERIZATION Right 01/18/2023    Procedure: URETHRAL DILATION, CYSTOGRAM, CYSTOSCOPY, RIGHT RETEROGRADE PYELOGRAM, [address], [address], COMPLEX CHICAS PLACEMENT;  Surgeon: Nehemiah Cardenas MD;  [facility];  Service: Urology   • SD CYSTO BLADDER W/URETERAL CATHETERIZATION Right 4/12/2023    Procedure: CYSTO  W/ STENT, urethral dilation with scope;  Surgeon: Nehemiah Cardenas MD;  [facility];  Service: Urology   • SD CYSTO CALIBRATION DILAT URTL STRIX/STENOSIS N/A 4/12/2023    Procedure: DILATATION URETHRAL;  Surgeon: Nehemiah Cardenas MD;  [facility];  Service: Urology   • ROTATOR CUFF REPAIR     • SHOULDER SURGERY     • TRANSURETHRAL RESECTION OF BLADDER TUMOR N/A 05/26/2022    Procedure: TRANSURETHRAL RESECTION OF BLADDER TUMOR (TURBT); Surgeon: Nehemiah Cardenas MD;  [facility];  Service: Urology       Meds/Allergies:    Prior to Admission medications    Medication Sig Start Date End Date Taking?  Authorizing Provider   albuterol (Ventolin HFA) 90 mcg/act inhaler Inhale 2 puffs every 6 (six) hours as needed for wheezing or shortness of breath 4/4/23   Melia Jones MD   Blood Glucose Calibration (OT ULTRA/FASTTK CNTRL SOLN) SOLN USE AS DIRECTED 5/16/23   Rekha Cantu DO   Comfort EZ Pen Needles 32G X 4 MM MISC USE 3-4 AS DIRECTED 5/17/22   Rekha Cantu, DO   Continuous Blood Gluc  (FreeStyle Frank 14 Day [place]) NATALIA Use 1 Device continuous  Patient not taking: Reported on 5/30/2023 6/3/22   Rekha Cantu DO   Incontinence Supply Disposable (Incontinence Brief Medium) MISC Use 4 (four) times a day 8/19/22 5/4/23  Alethea Chen MD   Lancet Devices (Lancing Device) MISC USE AS DIRECTED 4/11/23   Rekha Cantu DO   Levemir FlexTouch 100 units/mL injection pen INJECT 15 UNITS UNDER THE SKIN DAILY AT BEDTIME 6/13/23   Rekha Cantu, DO   loperamide (IMODIUM) 2 mg capsule Take 1 capsule (2 mg total) by mouth 2 (two) times a day as needed for diarrhea [facility] por cynthia si tiene diarrea 8/26/22   Billy Gomez, DO   Multiple Vitamin (multivitamin) tablet Take 1 tablet by mouth daily  Patient not taking: Reported on 5/4/2023    Historical Provider, MD   OneTouch Ultra test strip TEST 3-4 TIMES A DAY 4/11/23   Rekha Cantu, DO   oxybutynin (DITROPAN) 5 mg tablet Take 1 tablet (5 mg total) by mouth 2 (two) times a day as needed (bladder spasms) 6/2/23   YAMILKA Cerda   pregabalin (LYRICA) 75 mg capsule Take 1 capsule (75 mg total) by mouth 2 (two) times a day 3/28/23   Rekha Cantu DO   rosuvastatin (CRESTOR) 40 MG tablet TAKE ONE (1) TABLET BY MOUTH DAILY 3/14/23   Rekha Cantu, DO   tacrolimus (PROGRAF) 1 mg capsule TAKE 1 CAPSULE (1 MG TOTAL) BY MOUTH EVERY 12 (TWELVE) HOURS 9/27/22   Rekha Cantu, DO   tamsulosin (FLOMAX) 0.4 mg Take 1 capsule (0.4 mg total) by mouth daily with dinner 10/24/22 5/30/23  Ayaka Walker PA-C   temazepam (RESTORIL) 7.5 mg capsule TAKE 2 CAPSULES (15 MG TOTAL) BY MOUTH DAILY AT BEDTIME AS NEEDED FOR SLEEP 23   Christiana Spurling,    zolpidem (AMBIEN) 5 mg tablet TAKE 1 TABLET (5 MG TOTAL) BY MOUTH DAILY AT BEDTIME AS NEEDED FOR SLEEP 23   Betsy Bonds MD     I have reviewed home medications using allscripts. Allergies: Allergies   Allergen Reactions   • Tequin [Gatifloxacin] Rash   • Lipitor [Atorvastatin] Hives and Other (See Comments)     Rash and itching   • Ciprofloxacin Rash   • Iohexol Hives and Other (See Comments)     Contraindication with rosuvastatin. • Iv Contrast [Iodinated Contrast Media] Rash and Other (See Comments)     Unknown   • Levofloxacin Rash       Social History:     Marital Status: Single      Substance Use History:   Social History     Substance and Sexual Activity   Alcohol Use Not Currently     Social History     Tobacco Use   Smoking Status Former   • Packs/day: 1.00   • Years: 35.00   • Total pack years: 35.00   • Types: Cigarettes   • Start date: 63   • Quit date:    • Years since quittin.5   • Passive exposure: Past   Smokeless Tobacco Never   Tobacco Comments    former smoker per allscripts      Social History     Substance and Sexual Activity   Drug Use No    Comment: remotely quit drug use per allscripts        Family History:    Family History   Problem Relation Age of Onset   • Hypertension Mother         benign essential    • Lung cancer Father    • Diabetes Sister    • Cancer Brother    • Stroke Other         cva - due to embolism of cerebra artery        Physical Exam:     Vitals:   Blood Pressure: 165/77 (23 1630)  Pulse: 60 (23 1630)  Temperature: 98.4 °F (36.9 °C) (23 0920)  Temp Source: Tympanic (23 0920)  Respirations: 20 (23 1630)  SpO2: 100 % (23 1630)    Physical Exam  Vitals and nursing note reviewed. Constitutional:       General: He is not in acute distress. Appearance: He is well-developed. HENT:      Head: Normocephalic and atraumatic.    Eyes:      Conjunctiva/sclera: Conjunctivae normal.   Cardiovascular:      Rate and Rhythm: Normal rate and regular rhythm. Heart sounds: No murmur heard. Pulmonary:      Effort: Pulmonary effort is normal. No respiratory distress. Breath sounds: Normal breath sounds. Abdominal:      Palpations: Abdomen is soft. Tenderness: There is abdominal tenderness. Musculoskeletal:         General: No swelling. Cervical back: Neck supple. Skin:     General: Skin is warm and dry. Capillary Refill: Capillary refill takes less than 2 seconds. Neurological:      Mental Status: He is alert. Psychiatric:         Mood and Affect: Mood normal.              Additional Data:     Lab Results: I have personally reviewed pertinent reports. Results from last 7 days   Lab Units 07/24/23  1109   WBC Thousand/uL 5.34   HEMOGLOBIN g/dL 8.4*   HEMATOCRIT % 26.5*   PLATELETS Thousands/uL 67*   NEUTROS PCT % 62   LYMPHS PCT % 22   MONOS PCT % 9   EOS PCT % 5     Results from last 7 days   Lab Units 07/24/23  1109   SODIUM mmol/L 141   POTASSIUM mmol/L 4.5   CHLORIDE mmol/L 115*   CO2 mmol/L 19*   BUN mg/dL 54*   CREATININE mg/dL 3.60*   ANION GAP mmol/L 7   CALCIUM mg/dL 8.8   ALBUMIN g/dL 3.7   TOTAL BILIRUBIN mg/dL 0.24   ALK PHOS U/L 80   ALT U/L 37   AST U/L 53*   GLUCOSE RANDOM mg/dL 92     Results from last 7 days   Lab Units 07/24/23  1109   INR  1.10             Results from last 7 days   Lab Units 07/24/23  1109   LACTIC ACID mmol/L 1.5   PROCALCITONIN ng/ml 0.13       Imaging: I have personally reviewed pertinent reports. XR chest 2 views   ED Interpretation by Stefany Bowling DO (07/24 1229)   No acute bony abnormality. Evidence of old R-sided rib trauma again seen as on previous scan. R upper lobe opacity similar to prior study seen. Lower lobe opacity seen on lateral view of uncertain cause not seen on previous study. Final Result by Laly Cabral MD (07/24 5722)      Bilateral lung metastases with no acute disease. Workstation performed: [id number]         CT abdomen pelvis wo contrast   Final Result by Bonnie Mccain MD (07/24 1323)      1. No acute abnormality in the abdomen or pelvis. 2. Right double-J nephroureteral stent in place with mild right hydroureteronephrosis, slightly decreased since the prior study. Workstation performed: [id number]                  ** Please Note: This note has been constructed using a voice recognition system.  **

## 2023-07-24 NOTE — ASSESSMENT & PLAN NOTE
• H/o liver transplant secondary to alcoholic cirrhosis  • Continue with Prograf  • Check Prograf level

## 2023-07-24 NOTE — ED ATTENDING ATTESTATION
7/24/2023  IEdith MD, saw and evaluated the patient. I have discussed the patient with the resident/non-physician practitioner and agree with the resident's/non-physician practitioner's findings, Plan of Care, and MDM as documented in the resident's/non-physician practitioner's note, except where noted. All available labs and Radiology studies were reviewed. I was present for key portions of any procedure(s) performed by the resident/non-physician practitioner and I was immediately available to provide assistance. At this point I agree with the current assessment done in the Emergency Department. I have conducted an independent evaluation of this patient a history and physical is as follows:    OA: 73 y/o m with DM, CKD h/o cirrhosis s/p liver transplant approximately 20 years ago as well as bladder cancer and lung cancer who presents abdominal pain over the past 3 days. Positive diffuse periumbilical and upper quadrant discomfort. Positive nausea. Decreased oral intake. Subjective chills. No report of chest pain or shortness of breath. No report of urinary changes or stool changes. Daughter at bedside is helpful with majority of history. Patient not currently undergoing chemotherapy. No changes in his medications. No falls or trauma. On exam patient is uncomfortable appearing but with stable vital signs and afebril  HEENT is normocephalic and atraumatic with mildly dry mucous membranes that are otherwise anicteric. Neck is supple full range of motion. Heart is regular rate. Lungs decreased with poor effort. No wheezing appreciated. Abdomen is soft with well-healed right upper quadrant incision and tenderness to palpation over both right and left upper quadrants. Mild guarding no rebound. No edema. No calf tenderness palpation. Intact distal pulses capillary fill less than 2 seconds. Awake oriented. Symmetric face clear speech moving all extremities.   Assessment and plan abdominal pain with nausea decreased oral intake. Significant history for both lung and bladder cancer as well as previous liver transplant. Broad differential.  We will send labs including cardiac and infectious. Will obtain tacrolimus level. Will obtain imaging. Pain control gentle IV fluid hydration antiemetic treat accordingly. Patient will likely require admission. Portions of the record may have been created with voice recognition software. Occasional wrong word or “sound-a-like" substitutions may have occurred due to the inherent limitations of voice recognition software. Review chart carefully and recognize, using context, where substitutions have occurred.     ED Course         Critical Care Time  Procedures

## 2023-07-25 ENCOUNTER — TRANSITIONAL CARE MANAGEMENT (OUTPATIENT)
Dept: FAMILY MEDICINE CLINIC | Facility: CLINIC | Age: 73
End: 2023-07-25

## 2023-07-25 VITALS
RESPIRATION RATE: 18 BRPM | HEART RATE: 65 BPM | OXYGEN SATURATION: 98 % | TEMPERATURE: 97.9 F | HEIGHT: 62 IN | BODY MASS INDEX: 23 KG/M2 | SYSTOLIC BLOOD PRESSURE: 116 MMHG | WEIGHT: 125 LBS | DIASTOLIC BLOOD PRESSURE: 62 MMHG

## 2023-07-25 LAB
ALBUMIN SERPL BCP-MCNC: 3.4 G/DL (ref 3.5–5)
ALP SERPL-CCNC: 74 U/L (ref 46–116)
ALT SERPL W P-5'-P-CCNC: 35 U/L (ref 12–78)
ANION GAP SERPL CALCULATED.3IONS-SCNC: 4 MMOL/L
ANION GAP SERPL CALCULATED.3IONS-SCNC: 9 MMOL/L
AST SERPL W P-5'-P-CCNC: 48 U/L (ref 5–45)
BILIRUB SERPL-MCNC: 0.24 MG/DL (ref 0.2–1)
BUN SERPL-MCNC: 52 MG/DL (ref 5–25)
BUN SERPL-MCNC: 54 MG/DL (ref 5–25)
CALCIUM ALBUM COR SERPL-MCNC: 8.8 MG/DL (ref 8.3–10.1)
CALCIUM SERPL-MCNC: 8.3 MG/DL (ref 8.3–10.1)
CALCIUM SERPL-MCNC: 8.4 MG/DL (ref 8.3–10.1)
CHLORIDE SERPL-SCNC: 114 MMOL/L (ref 96–108)
CHLORIDE SERPL-SCNC: 117 MMOL/L (ref 96–108)
CO2 SERPL-SCNC: 21 MMOL/L (ref 21–32)
CO2 SERPL-SCNC: 21 MMOL/L (ref 21–32)
CREAT SERPL-MCNC: 3.28 MG/DL (ref 0.6–1.3)
CREAT SERPL-MCNC: 3.31 MG/DL (ref 0.6–1.3)
ERYTHROCYTE [DISTWIDTH] IN BLOOD BY AUTOMATED COUNT: 16.8 % (ref 11.6–15.1)
GFR SERPL CREATININE-BSD FRML MDRD: 17 ML/MIN/1.73SQ M
GFR SERPL CREATININE-BSD FRML MDRD: 17 ML/MIN/1.73SQ M
GLUCOSE P FAST SERPL-MCNC: 68 MG/DL (ref 65–99)
GLUCOSE SERPL-MCNC: 68 MG/DL (ref 65–140)
GLUCOSE SERPL-MCNC: 68 MG/DL (ref 65–140)
GLUCOSE SERPL-MCNC: 69 MG/DL (ref 65–140)
HCT VFR BLD AUTO: 26.8 % (ref 36.5–49.3)
HGB BLD-MCNC: 8.4 G/DL (ref 12–17)
MCH RBC QN AUTO: 24.6 PG (ref 26.8–34.3)
MCHC RBC AUTO-ENTMCNC: 31.3 G/DL (ref 31.4–37.4)
MCV RBC AUTO: 79 FL (ref 82–98)
PLATELET # BLD AUTO: 59 THOUSANDS/UL (ref 149–390)
POTASSIUM SERPL-SCNC: 4.4 MMOL/L (ref 3.5–5.3)
POTASSIUM SERPL-SCNC: 4.4 MMOL/L (ref 3.5–5.3)
PROT SERPL-MCNC: 8.2 G/DL (ref 6.4–8.4)
RBC # BLD AUTO: 3.41 MILLION/UL (ref 3.88–5.62)
SODIUM SERPL-SCNC: 142 MMOL/L (ref 135–147)
SODIUM SERPL-SCNC: 144 MMOL/L (ref 135–147)
WBC # BLD AUTO: 5.79 THOUSAND/UL (ref 4.31–10.16)

## 2023-07-25 PROCEDURE — 80053 COMPREHEN METABOLIC PANEL: CPT | Performed by: FAMILY MEDICINE

## 2023-07-25 PROCEDURE — 99239 HOSP IP/OBS DSCHRG MGMT >30: CPT | Performed by: FAMILY MEDICINE

## 2023-07-25 PROCEDURE — 82948 REAGENT STRIP/BLOOD GLUCOSE: CPT

## 2023-07-25 PROCEDURE — 85027 COMPLETE CBC AUTOMATED: CPT | Performed by: FAMILY MEDICINE

## 2023-07-25 PROCEDURE — 80048 BASIC METABOLIC PNL TOTAL CA: CPT | Performed by: FAMILY MEDICINE

## 2023-07-25 RX ORDER — PANTOPRAZOLE SODIUM 40 MG/1
40 TABLET, DELAYED RELEASE ORAL DAILY
Qty: 30 TABLET | Refills: 0 | Status: SHIPPED | OUTPATIENT
Start: 2023-07-25

## 2023-07-25 RX ORDER — ACETAMINOPHEN 325 MG/1
650 TABLET ORAL EVERY 6 HOURS PRN
Refills: 0
Start: 2023-07-25

## 2023-07-25 RX ADMIN — PANTOPRAZOLE SODIUM 40 MG: 40 TABLET, DELAYED RELEASE ORAL at 08:27

## 2023-07-25 RX ADMIN — TACROLIMUS 1 MG: 1 CAPSULE ORAL at 08:57

## 2023-07-25 RX ADMIN — PREGABALIN 75 MG: 75 CAPSULE ORAL at 08:27

## 2023-07-25 NOTE — NURSING NOTE
Pt c/o headache 9/10 pain. Intermittent over the last three days, new in nature. Pt has no neuro deficits at this time. MD made aware.

## 2023-07-25 NOTE — ASSESSMENT & PLAN NOTE
• H/o liver transplant secondary to alcoholic cirrhosis  • Continue with Prograf  • Follow-up gastroenterology as outpatient

## 2023-07-25 NOTE — ASSESSMENT & PLAN NOTE
• Patient presented to the ED with three days of generalized abdominal pain, nausea, and poor oral intake. • Lactic acid was 1.5, with preliminary growth of blood cultures having no growth  • Coag studies, procalcitonin, ammonia, tropinin were unremarkable  • UA showed small leukocytes and blood, urine microscopic WBC were 4-10. Stacey Arrietaz no antibiotic treatment necessary  • CT abdomen pelvis w/o contrast showed no acute abnormalities but there was right double J nephroureteral stent in place with mildright hydroureteronephrosis which is decreased from last study  • XR of the chest showed bilateral lung metastases with no acute disease  • US of abdomen was negative for pathologic processes  • CMP remained stable. • Pain improved and patient was able to tolerate a diet. Patient is eager to go home  • Patient was started on PPI given epigastric pain.   Since improvement in pain and able to tolerate a diet will continue with the PPI

## 2023-07-25 NOTE — ASSESSMENT & PLAN NOTE
Lab Results   Component Value Date    HGBA1C 7.3 (H) 05/03/2023       Recent Labs     07/24/23  1832 07/25/23  0503   POCGLU 81 69       Blood Sugar Average: Last 72 hrs:  Continue with outpatient regimen.   Give him his advanced malignancy without significant treatment options tight diabetic control is not indicated

## 2023-07-25 NOTE — ASSESSMENT & PLAN NOTE
• Known history of bladder tumor status post resection and radiation  • Follow-up with Urology  • Patient with right-sided nephrostomy tube  • Patient present with port cath as well, no current treatment is being completed  • Follow up outpatient with hematology-oncology-reviewing the previous progress note patient did not tolerate chemotherapy and no plan to continue with the further chemotherapy. Not a candidate for immunotherapy given his liver transplant. Post visit recommended by oncology. At this time patient is not ready for hospice.   Continue with supportive care

## 2023-07-25 NOTE — ASSESSMENT & PLAN NOTE
Lab Results   Component Value Date    EGFR 17 07/25/2023    EGFR 17 07/25/2023    EGFR 15 07/24/2023    CREATININE 3.31 (H) 07/25/2023    CREATININE 3.28 (H) 07/25/2023    CREATININE 3.60 (H) 07/24/2023     • Creatinine at baseline.

## 2023-07-25 NOTE — UTILIZATION REVIEW
Initial Clinical Review    Admission: Date/Time/Statement:   Admission Orders (From admission, onward)     Ordered        07/24/23 1555  Place in Observation  Once                      Orders Placed This Encounter   Procedures   • Place in Observation     Standing Status:   Standing     Number of Occurrences:   1     Order Specific Question:   Level of Care     Answer:   Med Surg [16]     ED Arrival Information     Expected   -    Arrival   7/24/2023 09:16    Acuity   Urgent            Means of arrival   Walk-In    Escorted by   Self    Service   Hospitalist    Admission type   Emergency            Arrival complaint   abdominal pain           Chief Complaint   Patient presents with   • Abdominal Pain     Pt c/o abdominal pain, nausea and loss of appetite x3 days - hx of lung cancer, not currently undergoing treatment. Pt denies fevers, V/D at home. Pt also complains of CP, SOB, fatigue and chills. Initial Presentation: 74 y.o. male to ED presents for generalized epigastric pain, more severe in the right upper quadrant at times x3 days, started suddenly. Also c/o decreased appetite and constipation, last BM 2 days ago. No relief with pain meds. Only Dilaudid helps per pt. Pain feels worse when he sits up and better with laying down. PMH for S/P liver transplant, hyperlipidemia, diabetes type 2 with long term current use of insulin, chronic kidney disease, anemia secondary to CKD, dementia, and bladder cancer with metastases to the lung. Admit Observation level of care for Generalized abdominal pain and Thrombocytopenia. Nausea and poor po intake. Low Hgb, CMP with elevated AST and elevated creat 3.60, baseline 3.10-3. 30. S/p IVF bolus x1 L given in ED. Lactic acid 1.5. Bld cultures pend. Pain control. Maalox, PPI Bid and non ulcerogenic diet. UA showed small leukocytes and blood, urine microscopic WBC were 4-10. Stacey Hui no antibiotic treatment necessary. On exam; significant epigastric tenderness.      ED Triage Vitals [07/24/23 0920]   Temperature Pulse Respirations Blood Pressure SpO2   98.4 °F (36.9 °C) 64 20 160/82 100 %      Temp Source Heart Rate Source Patient Position - Orthostatic VS BP Location FiO2 (%)   Tympanic Monitor Lying Left arm --      Pain Score       8          Wt Readings from Last 1 Encounters:   05/30/23 56.7 kg (125 lb)     Additional Vital Signs:   07/25/23 08:13:55 97.9 °F (36.6 °C) 65 -- 116/62 80 100 % -- --   07/25/23 05:03:52 98.1 °F (36.7 °C) 61 18 124/62 83 100 % None (Room air) --   07/24/23 22:45:25 97.6 °F (36.4 °C) 64 -- 145/72 96 99 % -- --   07/24/23 22:43:50 97.6 °F (36.4 °C) 65 -- 145/72 96 100 % -- --   07/24/23 20:53:23 97.3 °F (36.3 °C)   Abnormal  71 -- 148/72 97 99 % -- --   07/24/23 20:51:09 97.3 °F (36.3 °C)   Abnormal  -- -- 148/72 97 -- -- --   07/24/23 1900 -- 56 20 171/76   Abnormal  109 99 % -- Lying   07/24/23 1800 -- 58 20 152/70 101 99 % None (Room air) --   07/24/23 1630 -- 60 20 165/77 110 100 % None (Room air) --     Pertinent Labs/Diagnostic Test Results:   XR chest 2 views   ED Interpretation by Kal Augustin DO (07/24 1442)   No acute bony abnormality. Evidence of old R-sided rib trauma again seen as on previous scan. R upper lobe opacity similar to prior study seen. Lower lobe opacity seen on lateral view of uncertain cause not seen on previous study. Final Result by Eugenia Carroll MD (07/24 6065)      Bilateral lung metastases with no acute disease. Workstation performed: [id number]         CT abdomen pelvis wo contrast   Final Result by Malick Hardin MD (07/24 4033)      1. No acute abnormality in the abdomen or pelvis. 2. Right double-J nephroureteral stent in place with mild right hydroureteronephrosis, slightly decreased since the prior study.             Workstation performed: [id number]               Results from last 7 days   Lab Units 07/25/23  0504 07/24/23  1109   WBC Thousand/uL 5.79 5.34   HEMOGLOBIN g/dL 8.4* 8.4*   HEMATOCRIT % 26.8* 26.5*   PLATELETS Thousands/uL 59* 67*   NEUTROS ABS Thousands/µL  --  3.39         Results from last 7 days   Lab Units 07/25/23  0504 07/24/23  1109   SODIUM mmol/L 142 141   POTASSIUM mmol/L 4.4 4.5   CHLORIDE mmol/L 117* 115*   CO2 mmol/L 21 19*   ANION GAP mmol/L 4 7   BUN mg/dL 52* 54*   CREATININE mg/dL 3.31* 3.60*   EGFR ml/min/1.73sq m 17 15   CALCIUM mg/dL 8.4 8.8     Results from last 7 days   Lab Units 07/24/23  1109   AST U/L 53*   ALT U/L 37   ALK PHOS U/L 80   TOTAL PROTEIN g/dL 8.8*   ALBUMIN g/dL 3.7   TOTAL BILIRUBIN mg/dL 0.24   AMMONIA umol/L 13     Results from last 7 days   Lab Units 07/25/23  0503 07/24/23  1832   POC GLUCOSE mg/dl 69 81     Results from last 7 days   Lab Units 07/25/23  0504 07/24/23  1109   GLUCOSE RANDOM mg/dL 68 92       Results from last 7 days   Lab Units 07/24/23  1322 07/24/23  1109   HS TNI 0HR ng/L  --  10   HS TNI 2HR ng/L 8  --    HSTNI D2 ng/L -2  --          Results from last 7 days   Lab Units 07/24/23  1109   PROTIME seconds 14.4   INR  1.10   PTT seconds 30         Results from last 7 days   Lab Units 07/24/23  1109   PROCALCITONIN ng/ml 0.13     Results from last 7 days   Lab Units 07/24/23  1109   LACTIC ACID mmol/L 1.5       Results from last 7 days   Lab Units 07/24/23  1109   LIPASE u/L 236                 Results from last 7 days   Lab Units 07/24/23  1231   CLARITY UA  Clear   COLOR UA  Colorless   SPEC GRAV UA  1.010   PH UA  7.5   GLUCOSE UA mg/dl Negative   KETONES UA mg/dl Negative   BLOOD UA  Small*   PROTEIN UA mg/dl 30 (1+)*   NITRITE UA  Negative   BILIRUBIN UA  Negative   UROBILINOGEN UA (BE) mg/dl <2.0   LEUKOCYTES UA  Small*   WBC UA /hpf 4-10*   RBC UA /hpf 1-2   BACTERIA UA /hpf None Seen   EPITHELIAL CELLS WET PREP /hpf None Seen         Results from last 7 days   Lab Units 07/24/23  1127 07/24/23  1109   BLOOD CULTURE  Received in Microbiology Lab. Culture in Progress. Received in Microbiology Lab.  Culture in Progress.        ED Treatment:   Medication Administration from 07/24/2023 0916 to 07/24/2023 2044       Date/Time Order Dose Route Action     07/24/2023 1106 EDT HYDROmorphone (DILAUDID) injection 0.5 mg 0.5 mg Intravenous Given     07/24/2023 1106 EDT sodium chloride 0.9 % bolus 1,000 mL 1,000 mL Intravenous New Bag     07/24/2023 1711 EDT HYDROmorphone (DILAUDID) injection 0.5 mg 0.5 mg Intravenous Given     07/24/2023 1837 EDT pregabalin (LYRICA) capsule 75 mg 75 mg Oral Given     07/24/2023 1837 EDT atorvastatin (LIPITOR) tablet 40 mg 40 mg Oral Given     07/24/2023 1837 EDT tamsulosin (FLOMAX) capsule 0.4 mg 0.4 mg Oral Given     07/24/2023 1837 EDT pantoprazole (PROTONIX) EC tablet 40 mg 40 mg Oral Given     07/24/2023 1837 EDT insulin lispro (HumaLOG) 100 units/mL subcutaneous injection 1-6 Units 0 Units Subcutaneous Hold     07/24/2023 1837 EDT aluminum-magnesium hydroxide-simethicone (MAALOX) oral suspension 30 mL 30 mL Oral Given        Past Medical History:   Diagnosis Date   • Acute urinary retention 09/12/2022   • Alcoholism ([address])     "Sober over 20 years"   • Anemia    • Arthritis    • Back pain    • Bladder cancer ([address])    • Bruises easily    • Cerebral artery aneurysm    • Change in mental state     last assessed 5/18/15; resolved 10/27/15   • Chronic kidney disease    • COVID-19 2022 not hospitalized fully recovered   • Diabetes mellitus ([address])    • Difficult intravenous access    • Drug dependence ([address])     "in the past--40yrs ago per sig other"   • Fatigue     last assessed 1/26/15; resolved 5/24/16   • Full dentures    • Hepatitis C     "had treatment"   • History of cancer chemotherapy    • History of radiation therapy    • History of transfusion     per sig other "had blood transfusion when anemic from urinary bleeding"   • Hospital discharge follow-up 12/21/2018   • Hx of liver transplant [facility])    • Hydronephrosis of right kidney    • Insomnia     "after liver transplant"   • Kidney disease • 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HCC)    • Liver cirrhosis (HCC)    • Liver transplant recipient [facility])     "at [address]   • Lung cancer [facility])      Hematology/oncology Dr Colie Gilford   • Port-A-Cath in place    • Renal disorder    • Shortness of breath     "when moving around-started on inhalers"   • Stroke (cerebrum) ([address])    • Stroke ([address])     diff short term memory   • Subdural hygroma     2/27/14; resolved 7/28/15   • Thrombocytopenia ([address]) 09/20/2017   • Uses Latvian as primary spoken language      Present on Admission:  • Bladder cancer 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 (HCC)  • Anemia due to chronic kidney disease  • CKD (chronic kidney disease)  • Insomnia  • Thrombocytopenia (HCC)      Admitting Diagnosis: Generalized abdominal pain [R10.84]  Age/Sex: 74 y.o. male     Admission Orders:  Scheduled Medications:  atorvastatin, 40 mg, Oral, Daily With Dinner  insulin detemir, 10 Units, Subcutaneous, HS  insulin lispro, 1-6 Units, Subcutaneous, TID AC  pantoprazole, 40 mg, Oral, BID AC  pregabalin, 75 mg, Oral, BID  tacrolimus, 1 mg, Oral, Q12H  tamsulosin, 0.4 mg, Oral, Daily With Dinner      Continuous IV Infusions:     PRN Meds:  acetaminophen, 650 mg, Oral, Q6H PRN  albuterol, 2 puff, Inhalation, Q6H PRN  HYDROmorphone, 0.5 mg, Intravenous, Q4H PRN  ondansetron, 4 mg, Intravenous, Q6H PRN  temazepam, 15 mg, Oral, HS PRN  zolpidem, 5 mg, Oral, HS PRN        IP CONSULT TO CASE MANAGEMENT    Network Utilization Review Department  ATTENTION: Please call with any questions or concerns to [phone] and carefully listen to the prompts so that you are directed to the right person. All voicemails are confidential.  Elis Ramachandran all requests for admission clinical reviews, approved or denied determinations and any other requests to dedicated fax number below belonging to the campus where the patient is receiving treatment.  List of dedicated fax numbers for the Facilities:  FACILITY NAME UR FAX NUMBER   ADMISSION DENIALS (Administrative/Medical Necessity) [phone] [address] (Maternity/NICU/Pediatrics) [address] 1000 [facility] [phone] [address] [address] [facility] [address] [phone]

## 2023-07-25 NOTE — DISCHARGE SUMMARY
[address] [facility]  Discharge- Valerie Sniff 1950, 74 y.o. male MRN: [id number]  Unit/Bed#: Dolores Gomez 215-02 Encounter: [id number]  Primary Care Provider: Lidia Andrews DO   Date and time admitted to hospital: 7/24/2023  9:28 AM    * Generalized abdominal pain  Assessment & Plan  • Patient presented to the ED with three days of generalized abdominal pain, nausea, and poor oral intake. • Lactic acid was 1.5, with preliminary growth of blood cultures having no growth  • Coag studies, procalcitonin, ammonia, tropinin were unremarkable  • UA showed small leukocytes and blood, urine microscopic WBC were 4-10. Samson Piety no antibiotic treatment necessary  • CT abdomen pelvis w/o contrast showed no acute abnormalities but there was right double J nephroureteral stent in place with mildright hydroureteronephrosis which is decreased from last study  • XR of the chest showed bilateral lung metastases with no acute disease  • US of abdomen was negative for pathologic processes  • CMP remained stable. • Pain improved and patient was able to tolerate a diet. Patient is eager to go home  • Patient was started on PPI given epigastric pain. Since improvement in pain and able to tolerate a diet will continue with the PPI    CKD (chronic kidney disease)  Assessment & Plan  Lab Results   Component Value Date    EGFR 17 07/25/2023    EGFR 17 07/25/2023    EGFR 15 07/24/2023    CREATININE 3.31 (H) 07/25/2023    CREATININE 3.28 (H) 07/25/2023    CREATININE 3.60 (H) 07/24/2023     • Creatinine at baseline.     Bladder cancer 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 [facility])  Assessment & Plan  • Known history of bladder tumor status post resection and radiation  • Follow-up with Urology  • Patient with right-sided nephrostomy tube  • Patient present with port cath as well, no current treatment is being completed  • Follow up outpatient with hematology-oncology-reviewing the previous progress note patient did not tolerate chemotherapy and no plan to continue with the further chemotherapy. Not a candidate for immunotherapy given his liver transplant. Post visit recommended by oncology. At this time patient is not ready for hospice. Continue with supportive care    Insomnia  Assessment & Plan  Patient reports taking both temazepam and Ambien for insomnia for over 30 years. Confirmed with PDMP. History of liver transplant [facility])  Assessment & Plan  • H/o liver transplant secondary to alcoholic cirrhosis  • Continue with Prograf  • Follow-up gastroenterology as outpatient    Controlled type 2 diabetes mellitus with diabetic neuropathy, with long-term current use of insulin [facility])  Assessment & Plan  Lab Results   Component Value Date    HGBA1C 7.3 (H) 05/03/2023       Recent Labs     07/24/23  1832 07/25/23  0503   POCGLU 81 69       Blood Sugar Average: Last 72 hrs:  Continue with outpatient regimen. Give him his advanced malignancy without significant treatment options tight diabetic control is not indicated    Anemia due to chronic kidney disease  Assessment & Plan  • Hemoglobin remained stable at 8.4. By reviewing the records it appears to be above baseline. Thrombocytopenia [facility])  Assessment & Plan  Patient with chronic thrombocytopenia. At baseline      Medical Problems     Resolved Problems  Date Reviewed: 7/25/2023   None       Discharging Physician / Practitioner: Lora Montoya MD  PCP: Arlette Hawkins DO  Admission Date:   Admission Orders (From admission, onward)     Ordered        07/24/23 1555  Place in Observation  Once                      Discharge Date: 07/25/23    Consultations During Hospital Stay:  ·     Procedures Performed:   ·     Significant Findings / Test Results:   · Chest x-ray-bilateral lung metastasis with no acute disease  · CT abdomen and pelvis-no acute abnormality in the abdomen or pelvis.   Right double-J nephroureteral stent in place with mild right hydroureteronephrosis slightly decreased since prior study    Incidental Findings:     Test Results Pending at Discharge (will require follow up):   ·      Outpatient Tests Requested:      Complications:  none    Reason for Admission: Abdominal pain    Hospital Course:   Juvenal Allred is a 74 y.o. male patient who originally presented to the hospital on 7/24/2023 due to intractable abdominal pain. Patient had CAT scan done in the emergency room which was negative for any acute pathology. Chest x-ray was also negative for any acute pathology. Patient was admitted to the hospital for pain control. Patient was started on PPI given epigastric pain. Patient's pain improved able to tolerate a diet. Patient was eager to go home and was discharged in a stable condition home with outpatient follow-up with PCP. Patient with CKD and creatinine remained stable. Patient is currently not getting any chemotherapy. Overall appears to have poor prognosis. Patient is not ready for hospice        Please see above list of diagnoses and related plan for additional information. Condition at Discharge: Stable    Discharge Day Visit / Exam:   Subjective: Seen and examined. Patient reported that his pain is controlled. Able to tolerate a diet. Peg Cordero to go home  Vitals: Blood Pressure: 116/62 (07/25/23 0814)  Pulse: 65 (07/25/23 0814)  Temperature: 97.9 °F (36.6 °C) (07/25/23 0814)  Temp Source: Oral (07/25/23 0503)  Respirations: 18 (07/25/23 0503)  Height: 5' 2" (157.5 cm) (07/25/23 0900)  Weight - Scale: 56.7 kg (125 lb) (07/25/23 0900)  SpO2: 98 % (07/25/23 0814)  Exam:   Physical Exam  Constitutional:       General: He is not in acute distress. HENT:      Head: Normocephalic and atraumatic. Nose: Nose normal.   Eyes:      General: No scleral icterus. Cardiovascular:      Rate and Rhythm: Normal rate and regular rhythm. Pulses: Normal pulses. Pulmonary:      Effort: Pulmonary effort is normal.      Breath sounds: Normal breath sounds.    Abdominal:      General: There is no distension. Tenderness: There is no abdominal tenderness. Musculoskeletal:         General: Normal range of motion. Skin:     General: Skin is warm. Neurological:      Mental Status: He is alert. Mental status is at baseline. Discussion with Family: Updated  (significant other) via phone. Discharge instructions/Information to patient and family:   See after visit summary for information provided to patient and family. Provisions for Follow-Up Care:  See after visit summary for information related to follow-up care and any pertinent home health orders. Disposition:   Home    Planned Readmission:  none     Discharge Statement:  I spent 45 minutes discharging the patient. This time was spent on the day of discharge. I had direct contact with the patient on the day of discharge. Greater than 50% of the total time was spent examining patient, answering all patient questions, arranging and discussing plan of care with patient as well as directly providing post-discharge instructions. Additional time then spent on discharge activities. Discharge Medications:  See after visit summary for reconciled discharge medications provided to patient and/or family.       **Please Note: This note may have been constructed using a voice recognition system**

## 2023-07-25 NOTE — PLAN OF CARE
Problem: MOBILITY - ADULT  Goal: Maintain or return to baseline ADL function  Description: INTERVENTIONS:  -  Assess patient's ability to carry out ADLs; assess patient's baseline for ADL function and identify physical deficits which impact ability to perform ADLs (bathing, care of mouth/teeth, toileting, grooming, dressing, etc.)  - Assess/evaluate cause of self-care deficits   - Assess range of motion  - Assess patient's mobility; develop plan if impaired  - Assess patient's need for assistive devices and provide as appropriate  - Encourage maximum independence but intervene and supervise when necessary  - Involve family in performance of ADLs  - Assess for home care needs following discharge   - Consider OT consult to assist with ADL evaluation and planning for discharge  - Provide patient education as appropriate  Outcome: Progressing  Goal: Maintains/Returns to pre admission functional level  Description: INTERVENTIONS:  - Perform BMAT or MOVE assessment daily.   - Set and communicate daily mobility goal to care team and patient/family/caregiver.    - Collaborate with rehabilitation services on mobility goals if consulted  Problem: PAIN - ADULT  Goal: Verbalizes/displays adequate comfort level or baseline comfort level  Description: Interventions:  - Encourage patient to monitor pain and request assistance  - Assess pain using appropriate pain scale  - Administer analgesics based on type and severity of pain and evaluate response  - Implement non-pharmacological measures as appropriate and evaluate response  - Consider cultural and social influences on pain and pain management  - Notify physician/advanced practitioner if interventions unsuccessful or patient reports new pain  Outcome: Progressing     Problem: INFECTION - ADULT  Goal: Absence or prevention of progression during hospitalization  Description: INTERVENTIONS:  - Assess and monitor for signs and symptoms of infection  - Monitor lab/diagnostic results  - Monitor all insertion sites, i.e. indwelling lines, tubes, and drains  - Monitor endotracheal if appropriate and nasal secretions for changes in amount and color  - [place] appropriate cooling/warming therapies per order  - Administer medications as ordered  - Instruct and encourage patient and family to use good hand hygiene technique  - Identify and instruct in appropriate isolation precautions for identified infection/condition  Outcome: Progressing  Goal: Absence of fever/infection during neutropenic period  Description: INTERVENTIONS:  - Monitor WBC    Outcome: Progressing     Problem: SAFETY ADULT  Goal: Maintain or return to baseline ADL function  Description: INTERVENTIONS:  -  Assess patient's ability to carry out ADLs; assess patient's baseline for ADL function and identify physical deficits which impact ability to perform ADLs (bathing, care of mouth/teeth, toileting, grooming, dressing, etc.)  - Assess/evaluate cause of self-care deficits   - Assess range of motion  - Assess patient's mobility; develop plan if impaired  - Assess patient's need for assistive devices and provide as appropriate  - Encourage maximum independence but intervene and supervise when necessary  - Involve family in performance of ADLs  - Assess for home care needs following discharge   - Consider OT consult to assist with ADL evaluation and planning for discharge  - Provide patient education as appropriate  Outcome: Progressing  Goal: Maintains/Returns to pre admission functional level  Description: INTERVENTIONS:  - Perform BMAT or MOVE assessment daily.   - Set and communicate daily mobility goal to care team and patient/family/caregiver.    - Collaborate with rehabilitation services on mobility goals if consulted  - Out of bed for toileting  - Record patient progress and toleration of activity level   Outcome: Progressing  Goal: Patient will remain free of falls  Description: INTERVENTIONS:  - Educate patient/family on patient safety including physical limitations  - Instruct patient to call for assistance with activity   - Consult OT/PT to assist with strengthening/mobility   - Keep Call bell within reach  - Keep bed low and locked with side rails adjusted as appropriate  - Keep care items and personal belongings within reach  - Initiate and maintain comfort rounds  - Make Fall Risk Sign visible to staff  - Apply yellow socks and bracelet for high fall risk patients  - Consider moving patient to room near nurses station  Outcome: Progressing     - Out of bed for toileting  - Record patient progress and toleration of activity level   Outcome: Progressing

## 2023-07-27 ENCOUNTER — OFFICE VISIT (OUTPATIENT)
Dept: FAMILY MEDICINE CLINIC | Facility: CLINIC | Age: 73
End: 2023-07-27
Payer: COMMERCIAL

## 2023-07-27 ENCOUNTER — TELEPHONE (OUTPATIENT)
Dept: HEMATOLOGY ONCOLOGY | Facility: CLINIC | Age: 73
End: 2023-07-27

## 2023-07-27 ENCOUNTER — PATIENT OUTREACH (OUTPATIENT)
Dept: CASE MANAGEMENT | Facility: HOSPITAL | Age: 73
End: 2023-07-27

## 2023-07-27 VITALS
RESPIRATION RATE: 16 BRPM | BODY MASS INDEX: 22.01 KG/M2 | SYSTOLIC BLOOD PRESSURE: 130 MMHG | TEMPERATURE: 97.9 F | WEIGHT: 119.6 LBS | DIASTOLIC BLOOD PRESSURE: 60 MMHG | OXYGEN SATURATION: 99 % | HEART RATE: 68 BPM | HEIGHT: 62 IN

## 2023-07-27 DIAGNOSIS — C67.9 BLADDER CANCER METASTASIZED TO LUNG (HCC): ICD-10-CM

## 2023-07-27 DIAGNOSIS — C67.0 MALIGNANT NEOPLASM OF TRIGONE OF URINARY BLADDER (HCC): ICD-10-CM

## 2023-07-27 DIAGNOSIS — R10.84 GENERALIZED ABDOMINAL PAIN: ICD-10-CM

## 2023-07-27 DIAGNOSIS — C78.00 BLADDER CANCER METASTASIZED TO LUNG (HCC): ICD-10-CM

## 2023-07-27 DIAGNOSIS — Z09 HOSPITAL DISCHARGE FOLLOW-UP: Primary | ICD-10-CM

## 2023-07-27 DIAGNOSIS — Z94.4 LIVER TRANSPLANT STATUS (HCC): Chronic | ICD-10-CM

## 2023-07-27 PROCEDURE — 99496 TRANSJ CARE MGMT HIGH F2F 7D: CPT | Performed by: FAMILY MEDICINE

## 2023-07-27 RX ORDER — ISOPROPYL ALCOHOL 0.75 G/1
SWAB TOPICAL
COMMUNITY
Start: 2023-06-08

## 2023-07-27 RX ORDER — LANCETS 30 GAUGE
EACH MISCELLANEOUS
COMMUNITY
Start: 2023-05-11

## 2023-07-27 NOTE — PROGRESS NOTES
OSW received phone message from call center to call [facility]. [facility] and offered assistance. She stated Joanne Sanchez was recently in the hospital and he continues to not have any appetite, not drinking water and is very weak. She thinks he had a gastrointestinal virus and stomach pain which prompted his visit to the hospital. Since his return home he continues to exhibit these symptoms. She made an appointment for him to be seen by his PCP office later today. Provided emotional support and active listening. [facility] stated she has been talking to Joanne Sanchez about completing a power of . He has declined to date. She understands that decisions would be left to his biological children because they are not legally . However, she is his primary caregiver and is most involved with [facility]. She will re-visit this with him at another time. She asked if there are any supports that come to the home to offer therapy or cancer support. Explained [address] can provide a mentor by phone, and [facility] has excellent support groups, but you have to attend or join virtually. She will think about this. Offered to increase calls again and she is appreciative. Will plan on another call within 10-14 days.

## 2023-07-27 NOTE — PROGRESS NOTES
Assessment & Plan     1. Hospital discharge follow-up  Assessment & Plan:  Seen today for hospital follow-up. Reviewed hospital records. Patient was admitted with generalized abdominal pain. CT abdomen was unrevealing. UA was negative. Per the notes, patient was eager to leave. Pain had improved and was able to tolerate foods. Discharged on Protonix 40 mg daily. Patient has had decreased appetite which has improved with Protonix. He did lose 6 pounds since the 24th of July. I encourage patient to eat and to drink lots of fluids. Daughter states that patient has been feeling lightheaded. He does have a history of orthostatic hypotension which I do not appreciate today. Still, patient is still increase his oral hydration. I had discussion regarding palliative care. Given that patient does not choose to proceed with any further treatment, I suggested that palliative will be the best option. Patient is not ready for hospice but is amenable to palliative care. Referral  Ordered. Follow-up with me as needed. Daughter should continue to monitor his weight and contact the office if he continues to lose weight       2. Generalized abdominal pain    3. 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 Liver transplant status [facility])  -     Ambulatory Referral to Palliative Care; Future    5. Malignant neoplasm of trigone of urinary bladder ([address])  -     Ambulatory Referral to Palliative Care; Future         Subjective     Transitional Care Management Review:   Yesenia Garnica is a 74 y.o. male here for TCM follow up.      During the TCM phone call patient stated:  TCM Call     Date and time call was made  7/25/2023  4:15 PM    Hospital care reviewed  Records not available    Patient was hospitialized at  [address] [place] [address]    Date of Admission  07/24/23    Date of discharge  07/25/23    Diagnosis  Generalized abdominal pain    Disposition  Home    Were the patients medications reviewed and updated  No    Current Symptoms  None    Pain with urination severity  Mild    Pain with urination  Gradual      TCM Call     Post hospital issues  None    Should patient be enrolled in anticoag monitoring? No    Scheduled for follow up? Yes    Did you obtain your prescribed medications  Yes    Do you need help managing your prescriptions or medications  No    Is transportation to your appointment needed  No    I have advised the patient to call PCP with any new or worsening symptoms  MR [place]    Living Arrangements  Spouse or Significiant other    Support System  None    Are you recieving any outpatient services  No    Are you recieving home care services  No    Types of home care services  Nurse visit    Are you using any community resources  No    Current waiver services  No    Have you fallen in the last 12 months  No    How many times  2    Interperter language line needed  No    Counseling  Patient        Admitted 7/25 with generalized abdominal pain. Complicated past medical history which includes hepatitis C/alcoholic cirrhosis s/p liver transplant, diabetes, bladder cancer with metastasis to lungs, CKD, and CVA. Here with his daughter. Reviewed hospital records. In brief, patient was admitted with generalized abdominal pain. CT scan was obtained and showed right hydroureteronephrosis that had improved from previous studies. UA was collected and came back negative. He was started on a PPI and discharged home. Today he reports that his abdominal pain has improved. Daughter is concerned because he has had decreased appetite for a week. Patient states that it has improved since starting Protonix. Having regular bowel movements and his last BM was this morning. Has lost weight as a result of his decreased appetite. He denies nausea, vomiting, diarrhea, fevers, and urinary symptoms.     Review of Systems    Objective     /60 (BP Location: Left arm, Patient Position: Sitting, Cuff Size: Standard)   Pulse 68   Temp 97.9 °F (36.6 °C) (Tympanic)   Resp 16   Ht 5' 2" (1.575 m)   Wt 54.3 kg (119 lb 9.6 oz)   SpO2 99%   BMI 21.88 kg/m²      Physical Exam  Vitals reviewed. Constitutional:       Appearance: He is not ill-appearing, toxic-appearing or diaphoretic. HENT:      Head: Normocephalic and atraumatic. Eyes:      Extraocular Movements: Extraocular movements intact. Cardiovascular:      Rate and Rhythm: Normal rate and regular rhythm. Heart sounds: No murmur heard. Pulmonary:      Effort: Pulmonary effort is normal. No respiratory distress. Breath sounds: Normal breath sounds. Abdominal:      General: A surgical scar is present. There is no distension. Palpations: Abdomen is soft. There is no mass. Tenderness: There is abdominal tenderness (generalized ). There is no guarding or rebound. Hernia: A hernia (lateral to the umbilicus ) is present. Musculoskeletal:      Right lower leg: No edema. Left lower leg: No edema. Skin:     General: Skin is warm. Neurological:      Mental Status: He is alert and oriented to person, place, and time. Psychiatric:         Mood and Affect: Mood normal.         Behavior: Behavior normal.         Thought Content:  Thought content normal.       Medications have been reviewed by provider in current encounter    Luanne Diamond MD

## 2023-07-27 NOTE — TELEPHONE ENCOUNTER
Patient Call    Who are you speaking with? Significant Other    If it is not the patient, are they listed on an active communication consent form? Yes   What is the reason for this call? Winston Oquendo is requesting to speak to Soni Lai, she states this is very important and would like a call back as soon as possible. Does this require a call back? Yes   If a call back is required, please list best call back number [phone]   If a call back is required, advise that a message will be forwarded to their care team and someone will return their call as soon as possible. Did you relay this information to the patient?  Yes

## 2023-07-28 NOTE — ASSESSMENT & PLAN NOTE
Seen today for hospital follow-up. Reviewed hospital records. Patient was admitted with generalized abdominal pain. CT abdomen was unrevealing. UA was negative. Per the notes, patient was eager to leave. Pain had improved and was able to tolerate foods. Discharged on Protonix 40 mg daily. Patient has had decreased appetite which has improved with Protonix. He did lose 6 pounds since the 24th of July. I encourage patient to eat and to drink lots of fluids. Daughter states that patient has been feeling lightheaded. He does have a history of orthostatic hypotension which I do not appreciate today. Still, patient is still increase his oral hydration. I had discussion regarding palliative care. Given that patient does not choose to proceed with any further treatment, I suggested that palliative will be the best option. Patient is not ready for hospice but is amenable to palliative care. Referral  Ordered. Follow-up with me as needed.   Daughter should continue to monitor his weight and contact the office if he continues to lose weight

## 2023-07-29 LAB
BACTERIA BLD CULT: NORMAL
BACTERIA BLD CULT: NORMAL

## 2023-07-31 ENCOUNTER — APPOINTMENT (EMERGENCY)
Dept: RADIOLOGY | Facility: HOSPITAL | Age: 73
End: 2023-07-31
Payer: COMMERCIAL

## 2023-07-31 ENCOUNTER — HOSPITAL ENCOUNTER (EMERGENCY)
Facility: HOSPITAL | Age: 73
Discharge: HOME | End: 2023-07-31
Attending: EMERGENCY MEDICINE
Payer: COMMERCIAL

## 2023-07-31 VITALS
TEMPERATURE: 98 F | HEART RATE: 82 BPM | SYSTOLIC BLOOD PRESSURE: 155 MMHG | OXYGEN SATURATION: 97 % | DIASTOLIC BLOOD PRESSURE: 77 MMHG | RESPIRATION RATE: 20 BRPM

## 2023-07-31 DIAGNOSIS — R51.9 HEADACHE: Primary | ICD-10-CM

## 2023-07-31 LAB
ANION GAP SERPL CALCULATED.3IONS-SCNC: 7 MMOL/L
ATRIAL RATE: 197 BPM
BASOPHILS # BLD AUTO: 0.03 THOUSANDS/ÂΜL (ref 0–0.1)
BASOPHILS NFR BLD AUTO: 1 % (ref 0–1)
BUN SERPL-MCNC: 43 MG/DL (ref 5–25)
CALCIUM SERPL-MCNC: 9.2 MG/DL (ref 8.3–10.1)
CHLORIDE SERPL-SCNC: 115 MMOL/L (ref 96–108)
CO2 SERPL-SCNC: 14 MMOL/L (ref 21–32)
CREAT SERPL-MCNC: 3.5 MG/DL (ref 0.6–1.3)
EOSINOPHIL # BLD AUTO: 0.24 THOUSAND/ÂΜL (ref 0–0.61)
EOSINOPHIL NFR BLD AUTO: 5 % (ref 0–6)
ERYTHROCYTE [DISTWIDTH] IN BLOOD BY AUTOMATED COUNT: 16.6 % (ref 11.6–15.1)
GFR SERPL CREATININE-BSD FRML MDRD: 16 ML/MIN/1.73SQ M
GLUCOSE SERPL-MCNC: 122 MG/DL (ref 65–140)
HCT VFR BLD AUTO: 26.7 % (ref 36.5–49.3)
HGB BLD-MCNC: 8.2 G/DL (ref 12–17)
IMM GRANULOCYTES # BLD AUTO: 0.02 THOUSAND/UL (ref 0–0.2)
IMM GRANULOCYTES NFR BLD AUTO: 0 % (ref 0–2)
LYMPHOCYTES # BLD AUTO: 1.36 THOUSANDS/ÂΜL (ref 0.6–4.47)
LYMPHOCYTES NFR BLD AUTO: 26 % (ref 14–44)
MCH RBC QN AUTO: 23.8 PG (ref 26.8–34.3)
MCHC RBC AUTO-ENTMCNC: 30.7 G/DL (ref 31.4–37.4)
MCV RBC AUTO: 77 FL (ref 82–98)
MONOCYTES # BLD AUTO: 0.46 THOUSAND/ÂΜL (ref 0.17–1.22)
MONOCYTES NFR BLD AUTO: 9 % (ref 4–12)
NEUTROPHILS # BLD AUTO: 3.23 THOUSANDS/ÂΜL (ref 1.85–7.62)
NEUTS SEG NFR BLD AUTO: 59 % (ref 43–75)
NRBC BLD AUTO-RTO: 0 /100 WBCS
PLATELET # BLD AUTO: 91 THOUSANDS/UL (ref 149–390)
PMV BLD AUTO: 12.7 FL (ref 8.9–12.7)
POTASSIUM SERPL-SCNC: 4.5 MMOL/L (ref 3.5–5.3)
QRS AXIS: 3 DEGREES
QRSD INTERVAL: 66 MS
QT INTERVAL: 358 MS
QTC INTERVAL: 402 MS
RBC # BLD AUTO: 3.45 MILLION/UL (ref 3.88–5.62)
SODIUM SERPL-SCNC: 136 MMOL/L (ref 135–147)
T WAVE AXIS: 50 DEGREES
VENTRICULAR RATE: 76 BPM
WBC # BLD AUTO: 5.34 THOUSAND/UL (ref 4.31–10.16)

## 2023-07-31 PROCEDURE — 96376 TX/PRO/DX INJ SAME DRUG ADON: CPT

## 2023-07-31 PROCEDURE — 96361 HYDRATE IV INFUSION ADD-ON: CPT

## 2023-07-31 PROCEDURE — 93010 ELECTROCARDIOGRAM REPORT: CPT | Performed by: INTERNAL MEDICINE

## 2023-07-31 PROCEDURE — 36415 COLL VENOUS BLD VENIPUNCTURE: CPT

## 2023-07-31 PROCEDURE — 99285 EMERGENCY DEPT VISIT HI MDM: CPT | Performed by: EMERGENCY MEDICINE

## 2023-07-31 PROCEDURE — G1004 CDSM NDSC: HCPCS

## 2023-07-31 PROCEDURE — 99284 EMERGENCY DEPT VISIT MOD MDM: CPT

## 2023-07-31 PROCEDURE — 85025 COMPLETE CBC W/AUTO DIFF WBC: CPT | Performed by: EMERGENCY MEDICINE

## 2023-07-31 PROCEDURE — 96375 TX/PRO/DX INJ NEW DRUG ADDON: CPT

## 2023-07-31 PROCEDURE — 93005 ELECTROCARDIOGRAM TRACING: CPT

## 2023-07-31 PROCEDURE — 96365 THER/PROPH/DIAG IV INF INIT: CPT

## 2023-07-31 PROCEDURE — 70450 CT HEAD/BRAIN W/O DYE: CPT

## 2023-07-31 PROCEDURE — 80048 BASIC METABOLIC PNL TOTAL CA: CPT | Performed by: EMERGENCY MEDICINE

## 2023-07-31 RX ORDER — HYDROMORPHONE HCL/PF 1 MG/ML
0.5 SYRINGE (ML) INJECTION ONCE
Status: COMPLETED | OUTPATIENT
Start: 2023-07-31 | End: 2023-07-31

## 2023-07-31 RX ORDER — METOCLOPRAMIDE HYDROCHLORIDE 5 MG/ML
10 INJECTION INTRAMUSCULAR; INTRAVENOUS ONCE
Status: COMPLETED | OUTPATIENT
Start: 2023-07-31 | End: 2023-07-31

## 2023-07-31 RX ORDER — MAGNESIUM SULFATE HEPTAHYDRATE 40 MG/ML
2 INJECTION, SOLUTION INTRAVENOUS ONCE
Status: COMPLETED | OUTPATIENT
Start: 2023-07-31 | End: 2023-07-31

## 2023-07-31 RX ADMIN — METOCLOPRAMIDE 10 MG: 5 INJECTION, SOLUTION INTRAMUSCULAR; INTRAVENOUS at 21:15

## 2023-07-31 RX ADMIN — MAGNESIUM SULFATE HEPTAHYDRATE 2 G: 40 INJECTION, SOLUTION INTRAVENOUS at 20:14

## 2023-07-31 RX ADMIN — SODIUM CHLORIDE 1000 ML: 0.9 INJECTION, SOLUTION INTRAVENOUS at 21:16

## 2023-07-31 RX ADMIN — HYDROMORPHONE HYDROCHLORIDE 0.5 MG: 1 INJECTION, SOLUTION INTRAMUSCULAR; INTRAVENOUS; SUBCUTANEOUS at 21:10

## 2023-07-31 RX ADMIN — HYDROMORPHONE HYDROCHLORIDE 0.5 MG: 1 INJECTION, SOLUTION INTRAMUSCULAR; INTRAVENOUS; SUBCUTANEOUS at 20:10

## 2023-07-31 NOTE — ED ATTENDING ATTESTATION
7/31/2023  IRaina DO, saw and evaluated the patient. I have discussed the patient with the resident/non-physician practitioner and agree with the resident's/non-physician practitioner's findings, Plan of Care, and MDM as documented in the resident's/non-physician practitioner's note, except where noted. All available labs and Radiology studies were reviewed. I was present for key portions of any procedure(s) performed by the resident/non-physician practitioner and I was immediately available to provide assistance. At this point I agree with the current assessment done in the Emergency Department. I have conducted an independent evaluation of this patient a history and physical is as follows:    Patient is a 69-year-old male with known bladder cancer with lung and brain metastasis, previous liver transplant, diabetes, chronic kidney disease, lives with his family. Patient says 3 days ago with the gradual onset of a diffuse headache, now worse. Worse with nothing and better with nothing. No visual changes no one else sick with similar headaches, no recent head or neck infections, no recent head or neck surgeries. Patient says he is just not been hungry the last several days and so not eating and drinking very much. Patient was hospitalized by 24 July 25th for diffuse generalized abdominal pain, lactic acid was unremarkable, blood culture showed no growth, urinalysis showed no infection, CT abdomen and pelvis showed no acute abnormalities, chest x-ray showed known lung metastases with no acute disease. Ultrasound of the abdomen was unremarkable, pain improved and was able to tolerate a diet, was started on PPI. EMS indicates patient remained hemodynamically stable in route. Please note computer indicates preferred language of Nauruan but patient says he speaks [employer] well and does not need a .     General: Appears uncomfortable  Head:  Atraumatic  Eyes:  Conjunctiva pink, Extraocular muscle intact, PERRL, no temporal artery tenderness  ENT:  Mucous membranes are moist  Neck:  Supple  Cardiac:  S1-S2, without murmurs  Lungs:  Clear to auscultation bilaterally  Abdomen:  Soft, nontender, normal bowel sounds, no CVA tenderness, no tympany, no rigidity, no guarding  Extremities:  Normal range of motion  Neurologic:  Awake, fluent speech, normal comprehension. AAOx3. Cranial nerves 2-12 are intact, strength is 5/5 in the bilateral upper & lower extremities, no slurred speech, no facial droop, no deficit on finger-to-nose testing, no pronator drift. Sensation to light touch is equal and symmetric throughout the whole body  Skin:  Pink warm and dry, no rash      ED Course  ED Course as of 07/31/23 2216 Mon Jul 31, 2023 1953 ECG interpreted by me, sinus rhythm, rate of 76, slight baseline artifact, no acute ischemic or infarctive changes, no acute change from July 24, 2023 2200 Chronic renal insufficiency, chronic's chloride elevation, chronic anemia, chronic thrombocytopenia     CT head without contrast   Final Result      No acute intracranial abnormality.  Stable exam.                  Workstation performed: [id number]           Labs Reviewed   CBC AND DIFFERENTIAL - Abnormal       Result Value Ref Range Status    WBC 5.34  4.31 - 10.16 Thousand/uL Final    RBC 3.45 (*) 3.88 - 5.62 Million/uL Final    Hemoglobin 8.2 (*) 12.0 - 17.0 g/dL Final    Hematocrit 26.7 (*) 36.5 - 49.3 % Final    MCV 77 (*) 82 - 98 fL Final    MCH 23.8 (*) 26.8 - 34.3 pg Final    MCHC 30.7 (*) 31.4 - 37.4 g/dL Final    RDW 16.6 (*) 11.6 - 15.1 % Final    MPV 12.7  8.9 - 12.7 fL Final    Platelets 91 (*) 808 - 390 Thousands/uL Final    nRBC 0  /100 WBCs Final    Neutrophils Relative 59  43 - 75 % Final    Immat GRANS % 0  0 - 2 % Final    Lymphocytes Relative 26  14 - 44 % Final    Monocytes Relative 9  4 - 12 % Final    Eosinophils Relative 5  0 - 6 % Final    Basophils Relative 1  0 - 1 % Final Neutrophils Absolute 3.23  1.85 - 7.62 Thousands/µL Final    Immature Grans Absolute 0.02  0.00 - 0.20 Thousand/uL Final    Lymphocytes Absolute 1.36  0.60 - 4.47 Thousands/µL Final    Monocytes Absolute 0.46  0.17 - 1.22 Thousand/µL Final    Eosinophils Absolute 0.24  0.00 - 0.61 Thousand/µL Final    Basophils Absolute 0.03  0.00 - 0.10 Thousands/µL Final   BASIC METABOLIC PANEL - Abnormal    Sodium 136  135 - 147 mmol/L Final    Potassium 4.5  3.5 - 5.3 mmol/L Final    Chloride 115 (*) 96 - 108 mmol/L Final    CO2 14 (*) 21 - 32 mmol/L Final    ANION GAP 7  mmol/L Final    BUN 43 (*) 5 - 25 mg/dL Final    Creatinine 3.50 (*) 0.60 - 1.30 mg/dL Final    Comment: Standardized to IDMS reference method    Glucose 122  65 - 140 mg/dL Final    Comment: Specimen collection should occur prior to Sulfasalazine administration due to the potential for falsely depressed results. Specimen collection should occur prior to Sulfapyridine administration due to the potential for falsely elevated results. If the patient is fasting, the ADA then defines impaired fasting glucose as > 100 mg/dL and diabetes as > or equal to 123 mg/dL. Calcium 9.2  8.3 - 10.1 mg/dL Final    eGFR 16  ml/min/1.73sq m Final    Narrative:     [facility] guidelines for Chronic Kidney Disease (CKD):   •  Stage 1 with normal or high GFR (GFR > 90 mL/min/1.73 square meters)  •  Stage 2 Mild CKD (GFR = 60-89 mL/min/1.73 square meters)  •  Stage 3A Moderate CKD (GFR = 45-59 mL/min/1.73 square meters)  •  Stage 3B Moderate CKD (GFR = 30-44 mL/min/1.73 square meters)  •  Stage 4 Severe CKD (GFR = 15-29 mL/min/1.73 square meters)  •  Stage 5 End Stage CKD (GFR <15 mL/min/1.73 square meters)  Note: GFR calculation is accurate only with a steady state creatinine       On reassessment patient said he was feeling significantly better.   He says that he has a neurologist that he can get seen for follow-up with he says he feels comfortable being discharged home and following up as well as following up with palliative care. Supportive care, importance of follow-up and return precautions were discussed with the patient, who expressed understanding. DIAGNOSIS:  Acute nonspecific headache, chronic anemia, chronic thrombocytopenia, chronic renal insufficiency    MEDICAL DECISION MAKING CODING    Patient has a supple neck, no meningitis, no visual changes or temporal artery tenderness, do not believe this is temporal arteritis. The differential diagnosis before testing included (but is not limited to) acute primary headache, acute nonspecific headache, and acute intracranial hemorrhage which is a medical condition that poses a threat to life/function. Patient presents with acute new problem with:  Threat to life or bodily function      Chronic conditions affecting care: See above    COLLECTION AND INTERPRETATION OF DATA  Additional history obtained from: EMS  I reviewed prior external notes, including discharge summary as noted above    I ordered each unique test  Tests reviewed personally by me:  Labs: See above  Imaging: I independently reviewed the head CT and found no acute pathology. RISK  All of the patient's current prescription medications should be continued.     Treatment:  Consideration of admission: Considered admission however patient said he was feeling more comfortable, able to care for himself at home and has good family support    Parenteral controlled substances given    Surgery  -I considered surgery may be necessary prior to completion of the work up but afterwards there is no indication for immediate surgery    Social Determinants of Health:  Presentation to ED outside of business hours or on night shift        Critical Care Time  Procedures

## 2023-08-01 ENCOUNTER — HOSPITAL ENCOUNTER (OUTPATIENT)
Dept: RADIOLOGY | Facility: HOSPITAL | Age: 73
Discharge: HOME | End: 2023-08-01
Payer: COMMERCIAL

## 2023-08-01 ENCOUNTER — APPOINTMENT (OUTPATIENT)
Dept: LAB | Facility: CLINIC | Age: 73
End: 2023-08-01
Payer: COMMERCIAL

## 2023-08-01 ENCOUNTER — OFFICE VISIT (OUTPATIENT)
Dept: FAMILY MEDICINE CLINIC | Facility: CLINIC | Age: 73
End: 2023-08-01
Payer: COMMERCIAL

## 2023-08-01 VITALS
TEMPERATURE: 97.3 F | DIASTOLIC BLOOD PRESSURE: 66 MMHG | HEIGHT: 62 IN | HEART RATE: 70 BPM | BODY MASS INDEX: 21.57 KG/M2 | WEIGHT: 117.2 LBS | SYSTOLIC BLOOD PRESSURE: 134 MMHG | RESPIRATION RATE: 18 BRPM | OXYGEN SATURATION: 100 %

## 2023-08-01 DIAGNOSIS — R41.0 CONFUSION: ICD-10-CM

## 2023-08-01 DIAGNOSIS — R63.4 WEIGHT LOSS, ABNORMAL: ICD-10-CM

## 2023-08-01 DIAGNOSIS — R06.02 SOB (SHORTNESS OF BREATH): ICD-10-CM

## 2023-08-01 DIAGNOSIS — R63.0 POOR APPETITE: ICD-10-CM

## 2023-08-01 DIAGNOSIS — R19.7 DIARRHEA, UNSPECIFIED TYPE: ICD-10-CM

## 2023-08-01 DIAGNOSIS — R06.02 SOB (SHORTNESS OF BREATH): Primary | ICD-10-CM

## 2023-08-01 DIAGNOSIS — N18.9 CHRONIC KIDNEY DISEASE, UNSPECIFIED CKD STAGE: ICD-10-CM

## 2023-08-01 LAB
ALBUMIN SERPL BCP-MCNC: 3.9 G/DL (ref 3.5–5)
ALP SERPL-CCNC: 80 U/L (ref 46–116)
ALT SERPL W P-5'-P-CCNC: 27 U/L (ref 12–78)
AMMONIA PLAS-SCNC: 13 UMOL/L (ref 11–35)
ANION GAP SERPL CALCULATED.3IONS-SCNC: 6 MMOL/L
AST SERPL W P-5'-P-CCNC: 25 U/L (ref 5–45)
BACTERIA UR QL AUTO: ABNORMAL /HPF
BILIRUB DIRECT SERPL-MCNC: 0.1 MG/DL (ref 0–0.2)
BILIRUB SERPL-MCNC: 0.32 MG/DL (ref 0.2–1)
BILIRUB UR QL STRIP: NEGATIVE
BUN SERPL-MCNC: 45 MG/DL (ref 5–25)
CALCIUM SERPL-MCNC: 9.3 MG/DL (ref 8.3–10.1)
CHLORIDE SERPL-SCNC: 113 MMOL/L (ref 96–108)
CLARITY UR: ABNORMAL
CO2 SERPL-SCNC: 19 MMOL/L (ref 21–32)
COLOR UR: ABNORMAL
CREAT SERPL-MCNC: 3.66 MG/DL (ref 0.6–1.3)
GFR SERPL CREATININE-BSD FRML MDRD: 15 ML/MIN/1.73SQ M
GLUCOSE SERPL-MCNC: 173 MG/DL (ref 65–140)
GLUCOSE UR STRIP-MCNC: ABNORMAL MG/DL
GRAN CASTS #/AREA URNS LPF: ABNORMAL /[LPF]
HGB UR QL STRIP.AUTO: ABNORMAL
KETONES UR STRIP-MCNC: NEGATIVE MG/DL
LEUKOCYTE ESTERASE UR QL STRIP: ABNORMAL
NITRITE UR QL STRIP: NEGATIVE
NON-SQ EPI CELLS URNS QL MICRO: ABNORMAL /HPF
PH UR STRIP.AUTO: 5.5 [PH]
POTASSIUM SERPL-SCNC: 4.8 MMOL/L (ref 3.5–5.3)
PROT SERPL-MCNC: 8.9 G/DL (ref 6.4–8.4)
PROT UR STRIP-MCNC: ABNORMAL MG/DL
RBC #/AREA URNS AUTO: ABNORMAL /HPF
SODIUM SERPL-SCNC: 138 MMOL/L (ref 135–147)
SP GR UR STRIP.AUTO: 1.02 (ref 1–1.03)
TRANS CELLS #/AREA URNS HPF: PRESENT /[HPF]
UROBILINOGEN UR STRIP-ACNC: <2 MG/DL
WBC #/AREA URNS AUTO: ABNORMAL /HPF

## 2023-08-01 PROCEDURE — 87186 SC STD MICRODIL/AGAR DIL: CPT

## 2023-08-01 PROCEDURE — 87147 CULTURE TYPE IMMUNOLOGIC: CPT

## 2023-08-01 PROCEDURE — 99214 OFFICE O/P EST MOD 30 MIN: CPT | Performed by: FAMILY MEDICINE

## 2023-08-01 PROCEDURE — 80076 HEPATIC FUNCTION PANEL: CPT

## 2023-08-01 PROCEDURE — 71046 X-RAY EXAM CHEST 2 VIEWS: CPT

## 2023-08-01 PROCEDURE — 87086 URINE CULTURE/COLONY COUNT: CPT

## 2023-08-01 PROCEDURE — 81001 URINALYSIS AUTO W/SCOPE: CPT

## 2023-08-01 PROCEDURE — 82140 ASSAY OF AMMONIA: CPT

## 2023-08-01 RX ORDER — SUCRALFATE 1 G/1
1 TABLET ORAL 4 TIMES DAILY
Qty: 30 TABLET | Refills: 0 | Status: SHIPPED | OUTPATIENT
Start: 2023-08-01

## 2023-08-01 NOTE — DISCHARGE INSTRUCTIONS
YOU MUST FOLLOW UP WITH NEUROLOGY AND PALLIATIVE CARE AS SOON AS POSSIBLE. PLEASE RETURN IF HEADACHE RETURNS, IS SEVERE, DOES NOT IMPROVE WITH MEDICATIONS, YOU HAVE ANY SYMPTOMS OF STROKE, OR ANY NEW OR WORSENING SYMPTOMS THAT ARE CONCERNING.

## 2023-08-01 NOTE — ED NOTES
Wife updated at this time. Wife voiced concerns that patient has been failure to thrive, not eating or drinking for over a week.       Verlee Babinski, RN  07/31/23 2100

## 2023-08-01 NOTE — PROGRESS NOTES
Name: Graciela Morel      : 1950      MRN: [id number]  Encounter Provider: Sagar Gamboa MD  Encounter Date: 2023   Encounter department: [facility]       Here with decrease alertness, diarrhea, ongoing weight loss, and poor appetite. Evaluated in the ED yesterday but the findings were unrevealing. History of cirrhosis, bladder cancer with mets to the lungs. Refusing any further treatment and was referred to palliative medicine at the last visit. Has an appt later this week. Exam remarkable for dry oral mucous and abdominal tenderness. VSS. Will get CXR to evaluate chest tightness and SOB. Ammonia and U/A to assess recent confusion. Sucralfate to help with his abdominal discomfort. He should continue to take Protonix. LFTS also added due to pt's color today. Daughter also agreed he appeared more yellow today. Encourage patient to drink ensure protein plus and high calories meals. Will follow up results. Ill defer appetite stimulant to the palliative. 1. SOB (shortness of breath)  -     Ammonia; Future  -     XR chest pa & lateral; Future; Expected date: 2023  -     Hepatic function panel; Future    2. Poor appetite  -     Ammonia; Future  -     XR chest pa & lateral; Future; Expected date: 2023  -     Hepatic function panel; Future  -     UA w Reflex to Microscopic w Reflex to Culture -Lab Collect; Future; Expected date: 2023  -     sucralfate (CARAFATE) 1 g tablet; Take 1 tablet (1 g total) by mouth 4 (four) times a day    3. Weight loss, abnormal  -     Ammonia; Future  -     XR chest pa & lateral; Future; Expected date: 2023  -     Hepatic function panel; Future    4. Confusion  -     UA w Reflex to Microscopic w Reflex to Culture -Lab Collect; Future; Expected date: 2023    5. Diarrhea, unspecified type           Subjective      Here with daughter with several concerns  Continues to lose weight. No appetite.  Lost 3 lbs since Thursday. No nausea. Abdominal discomfort but not as bad as when he was in the hospital. More SOB. Was confused yesterday. Asked if he had dinner and was starring into space. When asked again, he looked at his wife for an answer. They called EMS and was seen in the ED. CT head, CBC, and CMP were unrevealing. He was later sent home. Pt is has been having diarrhea. Going 3-4 x a day. Started 7 days ago. No fever. No melena. Dysuria. Chest tightness with ambulation. No leg swelling. Has an appt with palliative at the end of the week     Review of Systems   Constitutional: Positive for appetite change, fatigue, fever and unexpected weight change. Respiratory: Positive for chest tightness. Negative for cough and wheezing. Cardiovascular: Negative for chest pain, palpitations and leg swelling. Gastrointestinal: Positive for abdominal pain and diarrhea. Negative for anal bleeding and nausea. Genitourinary: Positive for dysuria. Skin: Negative. Neurological: Negative for light-headedness and headaches. Psychiatric/Behavioral: Positive for confusion. Current Outpatient Medications on File Prior to Visit   Medication Sig   • acetaminophen (TYLENOL) 325 mg tablet Take 2 tablets (650 mg total) by mouth every 6 (six) hours as needed for mild pain   • albuterol (Ventolin HFA) 90 mcg/act inhaler Inhale 2 puffs every 6 (six) hours as needed for wheezing or shortness of breath   • Alcohol Swabs (B-D SINGLE USE SWABS REGULAR) PADS USE 2-3 TIMES DAILY AS NEEDED   • Blood Glucose Calibration (OT ULTRA/FASTTK CNTRL SOLN) SOLN USE AS DIRECTED   • Comfort EZ Pen Needles 32G X 4 MM MISC USE 3-4 AS DIRECTED   • Incontinence Supply Disposable (Incontinence Brief Medium) MISC Use 4 (four) times a day   • Lancet Devices (Lancing Device) MISC USE AS DIRECTED   • Lancets (OneTouch Delica Plus [id number]) MISC USE 3-4 TIMES AS DIRECTED.    • OneTouch Ultra test strip TEST 3-4 TIMES A DAY   • oxybutynin (DITROPAN) 5 mg tablet Take 1 tablet (5 mg total) by mouth 2 (two) times a day as needed (bladder spasms)   • pantoprazole (PROTONIX) 40 mg tablet Take 1 tablet (40 mg total) by mouth daily   • pregabalin (LYRICA) 75 mg capsule Take 1 capsule (75 mg total) by mouth 2 (two) times a day   • rosuvastatin (CRESTOR) 40 MG tablet TAKE ONE (1) TABLET BY MOUTH DAILY   • tacrolimus (PROGRAF) 1 mg capsule TAKE 1 CAPSULE (1 MG TOTAL) BY MOUTH EVERY 12 (TWELVE) HOURS   • tamsulosin (FLOMAX) 0.4 mg Take 1 capsule (0.4 mg total) by mouth daily with dinner   • temazepam (RESTORIL) 7.5 mg capsule TAKE 2 CAPSULES (15 MG TOTAL) BY MOUTH DAILY AT BEDTIME AS NEEDED FOR SLEEP   • zolpidem (AMBIEN) 5 mg tablet TAKE 1 TABLET (5 MG TOTAL) BY MOUTH DAILY AT BEDTIME AS NEEDED FOR SLEEP   • Continuous Blood Gluc  (ITC [place] 14 Day [place]) NATALIA Use 1 Device continuous (Patient not taking: Reported on 5/30/2023)   • loperamide (IMODIUM) 2 mg capsule Take 1 capsule (2 mg total) by mouth 2 (two) times a day as needed for diarrhea [employer] por cynthia si tiene diarrea (Patient not taking: Reported on 8/1/2023)       Objective     /66 (BP Location: Left arm, Patient Position: Sitting, Cuff Size: Adult)   Pulse 70   Temp (!) 97.3 °F (36.3 °C) (Tympanic)   Resp 18   Ht 5' 2" (1.575 m)   Wt 53.2 kg (117 lb 3.2 oz)   SpO2 100%   BMI 21.44 kg/m²     Physical Exam  Vitals reviewed. Constitutional:       General: He is not in acute distress. Appearance: He is ill-appearing. He is not toxic-appearing or diaphoretic. HENT:      Head: Normocephalic and atraumatic. Mouth/Throat:      Mouth: Mucous membranes are moist.      Comments: Dry mucosa   Eyes:      General: No scleral icterus. Right eye: No discharge. Left eye: No discharge. Extraocular Movements: Extraocular movements intact. Cardiovascular:      Rate and Rhythm: Normal rate and regular rhythm. Heart sounds: No murmur heard.   Pulmonary: Effort: Pulmonary effort is normal. No respiratory distress. Breath sounds: No stridor. No wheezing or rhonchi. Comments: Diminished in all lung fields    Abdominal:      General: There is no distension. Palpations: Abdomen is soft. There is no mass. Tenderness: There is abdominal tenderness (epigastric, RUQ, and suprapubic region ). There is no guarding. Hernia: No hernia is present. Musculoskeletal:      Right lower leg: No edema. Left lower leg: No edema. Lymphadenopathy:      Cervical: No cervical adenopathy. Skin:     Coloration: Skin is jaundiced (mildly jaundiced in the face). Neurological:      Mental Status: He is alert and oriented to person, place, and time. Psychiatric:         Mood and Affect: Mood is depressed. Speech: Speech normal.         Behavior: Behavior is slowed. Behavior is cooperative.        Tamela Mercado MD

## 2023-08-02 ENCOUNTER — TELEPHONE (OUTPATIENT)
Dept: FAMILY MEDICINE CLINIC | Facility: CLINIC | Age: 73
End: 2023-08-02

## 2023-08-03 ENCOUNTER — TELEPHONE (OUTPATIENT)
Dept: FAMILY MEDICINE CLINIC | Facility: CLINIC | Age: 73
End: 2023-08-03

## 2023-08-03 DIAGNOSIS — N34.2 INFECTIVE URETHRITIS: Primary | ICD-10-CM

## 2023-08-03 RX ORDER — CEPHALEXIN 250 MG/1
250 CAPSULE ORAL EVERY 12 HOURS SCHEDULED
Qty: 14 CAPSULE | Refills: 0 | Status: SHIPPED | OUTPATIENT
Start: 2023-08-03 | End: 2023-08-10

## 2023-08-03 NOTE — PROGRESS NOTES
Outpatient Follow-Up - Palliative and Supportive Care   Edwin Martinez 74 y.o. male [id number]    Assessment & Plan    1. Bladder cancer 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 (HCC)  - No cancer-related symptoms at present. Will continue to monitor. See below for more info regarding GOC and ACP. 2. Neuropathic pain of both legs  - Continue lyrica 75 mg BID  - Start nortriptyline (PAMELOR) 10 mg capsule; Take 1 capsule (10 mg total) by mouth daily at bedtime  Dispense: 30 capsule; Refill: 0    3. Goals of care, counseling/discussion  - Patient initially came into the visit expressing interest in hospice services. Though he does not want to pursue further cancer-directed therapies, after further discussion of his goals it appears that he is not yet appropriate for transition to hospice as he would still like to return the hospital for diagnosis and treatment of things like abdominal pain, dehydration and infection. He would like to pursue a comfort focused approach without drawing a hard line at re-hospitalization. He is prepared to transition to hospice once his condition declines to a point where he would no longer want to go back to the hospital.    4. ACP (advance care planning)  - Discussed at length with patient and his significant other, Rosibel. He clearly expressed that he would want her as his healthcare representative and Rosibel's daughter Argelia Donaldson as his alternative healthcare representative. ACP form completed, copied for patient, and scanned into chart. Follow up in 1 month or sooner PRN      Edwin Martinez was seen today for symptoms and planning cares related to above illnesses. I have reviewed the patient's controlled substance dispensing history in the Prescription Drug Monitoring Program in compliance with the [facility] regulations before prescribing any controlled substances. They are invited to continue to follow with us.   If there are questions or concerns, please contact us through our clinic/answering service 24 hours a day, seven days a week. Aileen Sharma MD  [facility]  [phone]      Visit Information    Accompanied By: Sig other [address] of History: Self, Sig other    History Limitations: Language Barrier    Contacts: Kitty Fabry, in chart    Chief Complaint  Chief Complaint   Patient presents with   • Follow-up     History of Present Illness      Kashmir Mahan is a 74 y.o. male who presents in follow up of symptoms related to bladder cancer metastatic to lunbg and cirrhosis. Pertinent issues include: symptom management, assessment of goals of care    Saw PCP 8/1 and noted decreased alertness, diarrhea, ongoing weight loss, and poor appetite. Admitted 7/24-25 (cc abdominal pain) and then seen in the ED 7/31 (cc HA) without new findings on workup. Patient stated to his PCP that he does not want further cancer treatments. He was found to have a UTI and was started on keflex. PCP also started carafate for abdominal discomfort but patient has not picked this up yet. Feeling much better than he was around his hospitalization, appetite has picked up a lot, no nausea, soft stools daily, sleep is good,    Oncology wanted to try [place] for his cancer but unable due to his post-transplant status. Knowing that there are no additional therapies available to him, he is interested in discussing whether it is appropriate to transition to hospice. His significant other Kitty Fabry does not yet feel he is ready for hospice.     Notes neuropathic pain in legs for many years, taking lyrica with some relief but still struggling a lot during the day, open to additional management options.     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 Continue with non-controlled substance management of pain.    2) Advanced Care planning  -Patient and significant other filled out [employer]   -states they did not bring it today, will plan to bring it at next visit with us    Past medical, surgical, social, and family histories are reviewed and pertinent updates are made. Review of Systems   Constitutional: Negative for decreased appetite, fever and malaise/fatigue. HENT: Negative for congestion, hearing loss and sore throat. Eyes: Negative for visual disturbance. Cardiovascular: Positive for dyspnea on exertion. Negative for chest pain and leg swelling. Skin: Negative for rash. Musculoskeletal: Negative for joint pain and joint swelling. Gastrointestinal: Negative for abdominal pain, anorexia, change in bowel habit, nausea and vomiting. Genitourinary: Positive for dysuria. Neurological: Positive for numbness and paresthesias. Negative for excessive daytime sleepiness. Psychiatric/Behavioral: Negative for altered mental status and depression. The patient does not have insomnia. Vital Signs    There were no vitals taken for this visit. Physical Exam and Objective Data  Physical Exam  Constitutional:       General: He is not in acute distress. HENT:      Head: Normocephalic and atraumatic. Right Ear: External ear normal.      Left Ear: External ear normal.      Nose: Nose normal.      Mouth/Throat:      Mouth: Mucous membranes are moist.   Eyes:      Conjunctiva/sclera: Conjunctivae normal.   Cardiovascular:      Rate and Rhythm: Normal rate. Pulmonary:      Effort: Pulmonary effort is normal. No respiratory distress. Abdominal:      General: There is no distension. Palpations: Abdomen is soft. Tenderness: There is no abdominal tenderness. Musculoskeletal:         General: No swelling or deformity. Skin:     General: Skin is warm and dry. Findings: No rash.    Neurological:      General: No focal deficit present. Mental Status: He is alert and oriented to person, place, and time. Psychiatric:         Mood and Affect: Mood normal.         Behavior: Behavior normal.         Thought Content: Thought content normal.         Judgment: Judgment normal.       Radiology and Laboratory:  I personally reviewed and interpreted the following results: Labs from 8/1, CXR 8/1, [facility] 7/31    60 minutes was spent face to face with Ricardo Torres with greater than 50% of the time spent in counseling or coordination of care including discussions of prognosis of diagnosis, risks and benefits of treatment, instructions for disease self management, treatment instructions, follow up requirements, patient and family counseling/involvement in care and compliance with treatment regimen. All of the patient's or agent's questions were answered during this discussion.

## 2023-08-04 ENCOUNTER — OFFICE VISIT (OUTPATIENT)
Dept: PALLIATIVE MEDICINE | Facility: CLINIC | Age: 73
End: 2023-08-04

## 2023-08-04 ENCOUNTER — TELEPHONE (OUTPATIENT)
Dept: FAMILY MEDICINE CLINIC | Facility: CLINIC | Age: 73
End: 2023-08-04

## 2023-08-04 VITALS
OXYGEN SATURATION: 98 % | DIASTOLIC BLOOD PRESSURE: 68 MMHG | SYSTOLIC BLOOD PRESSURE: 106 MMHG | WEIGHT: 120.59 LBS | RESPIRATION RATE: 18 BRPM | BODY MASS INDEX: 22.19 KG/M2 | HEART RATE: 70 BPM | HEIGHT: 62 IN | TEMPERATURE: 96.6 F

## 2023-08-04 DIAGNOSIS — Z71.89 GOALS OF CARE, COUNSELING/DISCUSSION: ICD-10-CM

## 2023-08-04 DIAGNOSIS — Z71.89 ACP (ADVANCE CARE PLANNING): ICD-10-CM

## 2023-08-04 DIAGNOSIS — C67.9 BLADDER CANCER METASTASIZED TO LUNG (HCC): Primary | ICD-10-CM

## 2023-08-04 DIAGNOSIS — N34.2 INFECTIVE URETHRITIS: Primary | ICD-10-CM

## 2023-08-04 DIAGNOSIS — G57.93 NEUROPATHIC PAIN OF BOTH LEGS: ICD-10-CM

## 2023-08-04 DIAGNOSIS — C78.00 BLADDER CANCER METASTASIZED TO LUNG (HCC): Primary | ICD-10-CM

## 2023-08-04 LAB — BACTERIA UR CULT: ABNORMAL

## 2023-08-04 RX ORDER — NORTRIPTYLINE HYDROCHLORIDE 10 MG/1
10 CAPSULE ORAL
Qty: 30 CAPSULE | Refills: 0 | Status: SHIPPED | OUTPATIENT
Start: 2023-08-04

## 2023-08-04 RX ORDER — DOXYCYCLINE HYCLATE 100 MG/1
CAPSULE ORAL
Qty: 8 CAPSULE | Refills: 0 | Status: SHIPPED | OUTPATIENT
Start: 2023-08-04 | End: 2023-08-11

## 2023-08-05 NOTE — ED PROVIDER NOTES
History  Chief Complaint   Patient presents with   • Headache     Per EMS, pt has a hx of cancer and has not eaten in the last 8 days. Pt only c/o HA 10/10 pain at this time. HPI   Patient is a 64-year-old male with past medical history bladder and lung cancer with known metastases who presents to the ED via EMS for evaluation of headache. Patient reports he developed a headache 3 days ago that was gradual in onset and has progressively worsened until today. Patient denies any known exacerbating or relieving factors. Patient denies taking any medications at home. Patient states he has not felt hungry for the past week and has not eaten any food at home but states he has been drinking fluids. Denies any changes in vision or hearing, lightheadedness or dizziness, slurred speech, facial droop, neck pain, chest pain, shortness of breath, or any other complaints or concerns at this time. Prior to Admission Medications   Prescriptions Last Dose Informant Patient Reported? Taking? Alcohol Swabs (B-D SINGLE USE SWABS REGULAR) PADS   Yes No   Sig: USE 2-3 TIMES DAILY AS NEEDED   Blood Glucose Calibration (OT ULTRA/FASTTK CNTRL SOLN) SOLN   No No   Sig: USE AS DIRECTED   Comfort EZ Pen Needles 32G X 4 MM MISC  Spouse/Significant Other No No   Sig: USE 3-4 AS DIRECTED   Continuous Blood Gluc  (FreeStyle Frank 14 Day [place]) NATALIA  Spouse/Significant Other No No   Sig: Use 1 Device continuous   Incontinence Supply Disposable (Incontinence Brief Medium) MISC  Spouse/Significant Other No No   Sig: Use 4 (four) times a day   Lancet Devices (Lancing Device) MISC   No No   Sig: USE AS DIRECTED   Lancets (OneTouch Delica Plus [id number]) MISC   Yes No   Sig: USE 3-4 TIMES AS DIRECTED.    OneTouch Ultra test strip   No No   Sig: TEST 3-4 TIMES A DAY   acetaminophen (TYLENOL) 325 mg tablet   No No   Sig: Take 2 tablets (650 mg total) by mouth every 6 (six) hours as needed for mild pain   albuterol (Ventolin HFA) 90 mcg/act inhaler   No No   Sig: Inhale 2 puffs every 6 (six) hours as needed for wheezing or shortness of breath   loperamide (IMODIUM) 2 mg capsule  Spouse/Significant Other No No   Sig: Take 1 capsule (2 mg total) by mouth 2 (two) times a day as needed for diarrhea [facility] cynthia huynh diarrea   Patient not taking: Reported on 8/1/2023   oxybutynin (DITROPAN) 5 mg tablet   No No   Sig: Take 1 tablet (5 mg total) by mouth 2 (two) times a day as needed (bladder spasms)   pantoprazole (PROTONIX) 40 mg tablet   No No   Sig: Take 1 tablet (40 mg total) by mouth daily   pregabalin (LYRICA) 75 mg capsule   No No   Sig: Take 1 capsule (75 mg total) by mouth 2 (two) times a day   rosuvastatin (CRESTOR) 40 MG tablet  Spouse/Significant Other No No   Sig: TAKE ONE (1) TABLET BY MOUTH DAILY   tacrolimus (PROGRAF) 1 mg capsule  Spouse/Significant Other No No   Sig: TAKE 1 CAPSULE (1 MG TOTAL) BY MOUTH EVERY 12 (TWELVE) HOURS   tamsulosin (FLOMAX) 0.4 mg  Spouse/Significant Other No No   Sig: Take 1 capsule (0.4 mg total) by mouth daily with dinner   temazepam (RESTORIL) 7.5 mg capsule   No No   Sig: TAKE 2 CAPSULES (15 MG TOTAL) BY MOUTH DAILY AT BEDTIME AS NEEDED FOR SLEEP   zolpidem (AMBIEN) 5 mg tablet   No No   Sig: TAKE 1 TABLET (5 MG TOTAL) BY MOUTH DAILY AT BEDTIME AS NEEDED FOR SLEEP      Facility-Administered Medications: None       Past Medical History:   Diagnosis Date   • Acute urinary retention 09/12/2022   • Alcoholism ([address])     "Sober over 20 years"   • Anemia    • Arthritis    • Back pain    • Bladder cancer (HCC)    • Bruises easily    • Cerebral artery aneurysm    • Change in mental state     last assessed 5/18/15; resolved 10/27/15   • Chronic kidney disease    • COVID-19 2022 not hospitalized fully recovered   • Diabetes mellitus ([address])    • Difficult intravenous access    • Drug dependence ([address])     "in the past--40yrs ago per sig other"   • Fatigue     last assessed 1/26/15; resolved 5/24/16   • Full dentures    • Hepatitis C     "had treatment"   • History of cancer chemotherapy    • History of radiation therapy    • History of transfusion     per sig other "had blood transfusion when anemic from urinary bleeding"   • Hospital discharge follow-up 12/21/2018   • Hx of liver transplant ([address])    • Hydronephrosis of right kidney    • Insomnia     "after liver transplant"   • Kidney disease    • 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[address])    • Liver cirrhosis ([address])    • Liver transplant recipient [facility])     "at [address]   • Lung cancer [facility])      Hematology/oncology Dr Kayode Mustafa   • Port-A-Cath in place    • Renal disorder    • Shortness of breath     "when moving around-started on inhalers"   • Stroke (cerebrum) ([address])    • Stroke ([address])     diff short term memory   • Subdural hygroma     2/27/14; resolved 7/28/15   • Thrombocytopenia ([address]) 09/20/2017   • Uses Danish as primary spoken language        Past Surgical History:   Procedure Laterality Date   • BRAIN SURGERY  02/12/2014    left frontotemporal 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   • CATARACT EXTRACTION     • CHOLECYSTECTOMY     • COLONOSCOPY     • CYSTOSCOPY  11/30/2022    [place]   • FL LUMBAR PUNCTURE DIAGNOSTIC  12/14/2018   • FL RETROGRADE PYELOGRAM  01/18/2023   • FL RETROGRADE PYELOGRAM  4/12/2023   • HEPATITIS B SURFACE AB QN,LIVER TRANSPLANT (HISTORICAL)     • IR BIOPSY LUNG  02/20/2023   • IR CHEST TUBE PLACEMENT  02/25/2023   • IR NEPHROSTOMY TUBE CHECK/CHANGE/REPOSITION/REINSERTION/UPSIZE  07/29/2022   • IR NEPHROSTOMY TUBE CHECK/CHANGE/REPOSITION/REINSERTION/UPSIZE  08/31/2022   • IR NEPHROSTOMY TUBE CHECK/CHANGE/REPOSITION/REINSERTION/UPSIZE  10/07/2022   • IR NEPHROSTOMY TUBE CHECK/CHANGE/REPOSITION/REINSERTION/UPSIZE  12/03/2022   • IR NEPHROSTOMY TUBE PLACEMENT  05/28/2022   • IR PICC LINE  12/14/2018   • IR PORT PLACEMENT  06/23/2022   • LIVER TRANSPLANTATION     • LUNG BIOPSY • NEPHROSTOMY TUBE CHANGE N/A 2023    Procedure: Removal of  percutaneous nephroureteral catheter;  Surgeon: Stevenson Mcqueen MD;  [facility];  Service: Urology   • [place] CYSTO BLADDER W/URETERAL CATHETERIZATION Right 2023    Procedure: URETHRAL DILATION, CYSTOGRAM, CYSTOSCOPY, RIGHT RETEROGRADE PYELOGRAM, [address], [address], COMPLEX CHICAS PLACEMENT;  Surgeon: Stevenson Mcqueen MD;  [facility];  Service: Urology   • MN CYSTO BLADDER W/URETERAL CATHETERIZATION Right 2023    Procedure: CYSTO  W/ STENT, urethral dilation with scope;  Surgeon: Stevenson Mcqueen MD;  [facility];  Service: Urology   • MN CYSTO CALIBRATION DILAT URTL STRIX/STENOSIS N/A 2023    Procedure: DILATATION URETHRAL;  Surgeon: Stevenson Mcqueen MD;  [facility];  Service: Urology   • ROTATOR CUFF REPAIR     • SHOULDER SURGERY     • TRANSURETHRAL RESECTION OF BLADDER TUMOR N/A 2022    Procedure: TRANSURETHRAL RESECTION OF BLADDER TUMOR (TURBT); Surgeon: Stevenson Mcqueen MD;  [facility];  Service: Urology       Family History   Problem Relation Age of Onset   • Hypertension Mother         benign essential    • Lung cancer Father    • Diabetes Sister    • Cancer Brother    • Stroke Other         cva - due to embolism of cerebra artery      I have reviewed and agree with the history as documented.     E-Cigarette/Vaping   • E-Cigarette Use Never User      E-Cigarette/Vaping Substances   • Nicotine No    • THC No    • CBD No    • Flavoring No    • Other No    • Unknown No      Social History     Tobacco Use   • Smoking status: Former     Packs/day: 1.00     Years: 35.00     Total pack years: 35.00     Types: Cigarettes     Start date: 63     Quit date: 2000     Years since quittin.6     Passive exposure: Past   • Smokeless tobacco: Never   • Tobacco comments:     former smoker per allscripts    Vaping Use   • Vaping Use: Never used   Substance Use Topics   • Alcohol use: Not Currently   • Drug use: No     Comment: remotely quit drug use per allscripts         Review of Systems   Constitutional: Positive for appetite change. Neurological: Positive for headaches. All other systems reviewed and are negative. Physical Exam  ED Triage Vitals [07/31/23 1857]   Temperature Pulse Respirations Blood Pressure SpO2   98 °F (36.7 °C) 80 20 169/82 100 %      Temp Source Heart Rate Source Patient Position - Orthostatic VS BP Location FiO2 (%)   Oral Monitor Lying Left arm --      Pain Score       10 - Worst Possible Pain             Orthostatic Vital Signs  Vitals:    07/31/23 1930 07/31/23 2030 07/31/23 2130 07/31/23 2200   BP: 154/81 157/84 128/57 155/77   Pulse: 68 72 62 82   Patient Position - Orthostatic VS:           Physical Exam  Vitals and nursing note reviewed. Constitutional:       General: He is not in acute distress. Appearance: He is not ill-appearing or diaphoretic. HENT:      Head: Normocephalic and atraumatic. Right Ear: Tympanic membrane, ear canal and external ear normal.      Left Ear: Tympanic membrane, ear canal and external ear normal.      Nose: No congestion or rhinorrhea. Mouth/Throat:      Mouth: Mucous membranes are moist.      Pharynx: Oropharynx is clear. Eyes:      General: No scleral icterus. Extraocular Movements: Extraocular movements intact. Conjunctiva/sclera: Conjunctivae normal.      Pupils: Pupils are equal, round, and reactive to light. Cardiovascular:      Rate and Rhythm: Normal rate and regular rhythm. Pulses: Normal pulses. Heart sounds: Normal heart sounds. No murmur heard. Pulmonary:      Effort: Pulmonary effort is normal. No respiratory distress. Breath sounds: Normal breath sounds. No wheezing, rhonchi or rales. Abdominal:      General: There is no distension. Tenderness: There is no abdominal tenderness. There is no guarding or rebound.    Musculoskeletal: General: No deformity or signs of injury. Normal range of motion. Cervical back: Normal range of motion and neck supple. No rigidity or tenderness. Skin:     General: Skin is warm. Capillary Refill: Capillary refill takes less than 2 seconds. Coloration: Skin is not pale. Findings: No bruising. Neurological:      General: No focal deficit present. Mental Status: He is alert and oriented to person, place, and time. Mental status is at baseline. Cranial Nerves: No cranial nerve deficit. Sensory: No sensory deficit. Motor: No weakness.       Coordination: Coordination normal.      Gait: Gait normal.   Psychiatric:         Mood and Affect: Mood normal.         Behavior: Behavior normal.         ED Medications  Medications   HYDROmorphone (DILAUDID) injection 0.5 mg (0.5 mg Intravenous Given 7/31/23 2010)   magnesium sulfate 2 g/50 mL IVPB (premix) 2 g (0 g Intravenous Stopped 7/31/23 2110)   HYDROmorphone (DILAUDID) injection 0.5 mg (0.5 mg Intravenous Given 7/31/23 2110)   metoclopramide (REGLAN) injection 10 mg (10 mg Intravenous Given 7/31/23 2115)   sodium chloride 0.9 % bolus 1,000 mL (0 mL Intravenous Stopped 7/31/23 2244)       Diagnostic Studies  Results Reviewed     Procedure Component Value Units Date/Time    CBC and differential [[id number]]  (Abnormal) Collected: 07/31/23 2116    Lab Status: Final result Specimen: Blood from Arm, Left Updated: 07/31/23 2142     WBC 5.34 Thousand/uL      RBC 3.45 Million/uL      Hemoglobin 8.2 g/dL      Hematocrit 26.7 %      MCV 77 fL      MCH 23.8 pg      MCHC 30.7 g/dL      RDW 16.6 %      MPV 12.7 fL      Platelets 91 Thousands/uL      nRBC 0 /100 WBCs      Neutrophils Relative 59 %      Immat GRANS % 0 %      Lymphocytes Relative 26 %      Monocytes Relative 9 %      Eosinophils Relative 5 %      Basophils Relative 1 %      Neutrophils Absolute 3.23 Thousands/µL      Immature Grans Absolute 0.02 Thousand/uL      Lymphocytes Absolute 1.36 Thousands/µL      Monocytes Absolute 0.46 Thousand/µL      Eosinophils Absolute 0.24 Thousand/µL      Basophils Absolute 0.03 Thousands/µL     Basic metabolic panel [[id number]]  (Abnormal) Collected: 07/31/23 1909    Lab Status: Final result Specimen: Blood from Arm, Left Updated: 07/31/23 1941     Sodium 136 mmol/L      Potassium 4.5 mmol/L      Chloride 115 mmol/L      CO2 14 mmol/L      ANION GAP 7 mmol/L      BUN 43 mg/dL      Creatinine 3.50 mg/dL      Glucose 122 mg/dL      Calcium 9.2 mg/dL      eGFR 16 ml/min/1.73sq m     Narrative:      [place] guidelines for Chronic Kidney Disease (CKD):   •  Stage 1 with normal or high GFR (GFR > 90 mL/min/1.73 square meters)  •  Stage 2 Mild CKD (GFR = 60-89 mL/min/1.73 square meters)  •  Stage 3A Moderate CKD (GFR = 45-59 mL/min/1.73 square meters)  •  Stage 3B Moderate CKD (GFR = 30-44 mL/min/1.73 square meters)  •  Stage 4 Severe CKD (GFR = 15-29 mL/min/1.73 square meters)  •  Stage 5 End Stage CKD (GFR <15 mL/min/1.73 square meters)  Note: GFR calculation is accurate only with a steady state creatinine                 CT head without contrast   Final Result by Ravinder Buitrago MD (07/31 2137)      No acute intracranial abnormality. Stable exam.                  Workstation performed: [id number]               Procedures  Procedures      ED Course  ED Course as of 08/05/23 1400   Mon Jul 31, 2023 1949 Headache for three days, hx metastatic bladder cancer, dec PO intake, no zofran   2157 CBC and differential(!)  Similar to prior, platelets improved   2157 CT head without contrast  No acute intracranial abnormality. Stable exam.               Identification of Seniors at Risk    [facility] Most Recent Value   (ISAR) Identification of Seniors at Risk    Before the illness or injury that brought you to the Emergency, did you need someone to help you on a regular basis?  1 Filed at: 07/31/2023 8572   In the last 24 hours, have you needed more help than usual? 0 Filed at: 07/31/2023 1859   Have you been hospitalized for one or more nights during the past 6 months? 1 Filed at: 07/31/2023 1859   In general, do you see well? 0 Filed at: 07/31/2023 1859   In general, do you have serious problems with your memory? 0 Filed at: 07/31/2023 1859   Do you take more than three different medications every day? 1 Filed at: 07/31/2023 1859   ISAR Score 3 Filed at: 07/31/2023 1859                    SBIRT 22yo+    Flowsheet Row Most Recent Value   Initial Alcohol Screen: US AUDIT-C     1. How often do you have a drink containing alcohol? 0 Filed at: 07/31/2023 1859   2. How many drinks containing alcohol do you have on a typical day you are drinking? 0 Filed at: 07/31/2023 1859   3b. FEMALE Any Age, or MALE 65+: How often do you have 4 or more drinks on one occassion? 0 Filed at: 07/31/2023 1859   Audit-C Score 0 Filed at: 07/31/2023 1859   VERNELL: How many times in the past year have you. .. Used an illegal drug or used a prescription medication for non-medical reasons? Never Filed at: 07/31/2023 1859                Medical Decision Making  Headache: acute illness or injury  Amount and/or Complexity of Data Reviewed  Independent Historian: spouse and EMS  Labs: ordered. Decision-making details documented in ED Course. Radiology: ordered. Decision-making details documented in ED Course. Risk  OTC drugs. Prescription drug management. Decision regarding hospitalization. Patient is a 47-year-old male presents ED for evaluation of headache. Differential diagnose includes but not limited to: Tension headache, migraine headache, brain metastases, ICH.  CBC, BMP, CT head ordered. Patient treated with Dilaudid 0.5 mg IV. See ED course for additional details. Results reviewed, CT head negative for mass or bleed. Discussed results and plan with patient.  Patient offered admission for severe headache with history of decreased appetite and poor PO intake. Patient states he would prefer to be discharged home with outpatient follow-up at this time. Advised on need for outpatient follow up, given information and strict follow-up instructions with primary care provider, oncologist, and palliative care. Given return precautions verbally and in discharge instructions. All questions answered. Patient expressed verbal understanding and is agreeable with plan for discharge with outpatient follow up. Disposition  Final diagnoses:   Headache     Time reflects when diagnosis was documented in both MDM as applicable and the Disposition within this note     Time User Action Codes Description Comment    7/31/2023 10:16 PM Nikia Gallardo Add [R51.9] Headache       ED Disposition     ED Disposition   Discharge    Condition   Stable    Date/Time   Mon Jul 31, 2023 2216    [address] discharge to home/self care.                Follow-up Information     Follow up With Specialties Details Why Contact Info Additional Information    Andres Dsouza DO Family Medicine Schedule an appointment as soon as possible for a visit   [address] Neurology [facility] Neurology   [facility]  [phone] [facility] [place], [place], [address]    [address] [place] [address] Emergency Department Emergency Medicine Go to  If symptoms worsen [address] [id number]  [facility] Emergency Department, [address], [place], [place], [facility]          Discharge Medication List as of 7/31/2023 10:18 PM      CONTINUE these medications which have NOT CHANGED    Details   acetaminophen (TYLENOL) 325 mg tablet Take 2 tablets (650 mg total) by mouth every 6 (six) hours as needed for mild pain, Starting Tue 7/25/2023, No Print albuterol (Ventolin HFA) 90 mcg/act inhaler Inhale 2 puffs every 6 (six) hours as needed for wheezing or shortness of breath, Starting Tue 4/4/2023, Normal      Alcohol Swabs (B-D SINGLE USE SWABS REGULAR) PADS USE 2-3 TIMES DAILY AS NEEDED, Historical Med      Blood Glucose Calibration (OT ULTRA/FASTTK CNTRL SOLN) SOLN USE AS DIRECTED, Normal      Comfort EZ Pen Needles 32G X 4 MM MISC USE 3-4 AS DIRECTED, Normal      Continuous Blood Gluc  (FreeStyle Frank 14 Day [place]) NATALIA Use 1 Device continuous, Starting Fri 6/3/2022, Normal      Incontinence Supply Disposable (Incontinence Brief Medium) MISC Use 4 (four) times a day, Starting Fri 8/19/2022, Until Thu 7/27/2023, Print      Lancet Devices (Lancing Device) MISC USE AS DIRECTED, Normal      Lancets (OneTouch Delica Plus [id number]) MISC USE 3-4 TIMES AS DIRECTED., Historical Med      loperamide (IMODIUM) 2 mg capsule Take 1 capsule (2 mg total) by mouth 2 (two) times a day as needed for diarrhea [facility] por cynthia si lino diarrea, Starting Fri 8/26/2022, Normal      OneTouch Ultra test strip TEST 3-4 TIMES A DAY, Normal      oxybutynin (DITROPAN) 5 mg tablet Take 1 tablet (5 mg total) by mouth 2 (two) times a day as needed (bladder spasms), Starting Fri 6/2/2023, Normal      pantoprazole (PROTONIX) 40 mg tablet Take 1 tablet (40 mg total) by mouth daily, Starting Tue 7/25/2023, Normal      pregabalin (LYRICA) 75 mg capsule Take 1 capsule (75 mg total) by mouth 2 (two) times a day, Starting Tue 3/28/2023, Normal      rosuvastatin (CRESTOR) 40 MG tablet TAKE ONE (1) TABLET BY MOUTH DAILY, Normal      tacrolimus (PROGRAF) 1 mg capsule TAKE 1 CAPSULE (1 MG TOTAL) BY MOUTH EVERY 12 (TWELVE) HOURS, Starting Tue 9/27/2022, Normal      tamsulosin (FLOMAX) 0.4 mg Take 1 capsule (0.4 mg total) by mouth daily with dinner, Starting Mon 10/24/2022, Until Thu 7/27/2023, Normal      temazepam (RESTORIL) 7.5 mg capsule TAKE 2 CAPSULES (15 MG TOTAL) BY MOUTH DAILY AT BEDTIME AS NEEDED FOR SLEEP, Starting Fri 5/5/2023, Normal      zolpidem (AMBIEN) 5 mg tablet TAKE 1 TABLET (5 MG TOTAL) BY MOUTH DAILY AT BEDTIME AS NEEDED FOR SLEEP, Starting Tue 6/20/2023, Normal      Levemir FlexTouch 100 units/mL injection pen INJECT 15 UNITS UNDER THE SKIN DAILY AT BEDTIME, Starting Tue 6/13/2023, Normal           No discharge procedures on file. PDMP Review       Value Time User    PDMP Reviewed  Yes 7/25/2023  9:32 AM Prerna Samayoa MD           ED Provider  Attending physically available and evaluated Phil Deluna. I managed the patient along with the ED Attending.     Electronically Signed by         Flint Heimlich, DO  08/05/23 0616

## 2023-08-08 ENCOUNTER — PATIENT OUTREACH (OUTPATIENT)
Dept: CASE MANAGEMENT | Facility: HOSPITAL | Age: 73
End: 2023-08-08

## 2023-08-08 NOTE — PROGRESS NOTES
OSW called Rosibel today. She shared his recent issues with Rogers's UTI and palliative care visit. She was very relieved to share that Viry Downing signed the ACP form with the palliative team naming her as his healthcare agent. Overall she was very pleased with the visit. Viry Downing is feeling better since he started the antibiotic for his UTI and is eating and drinking better. Will continue supportive outreach and she is very appreciative.

## 2023-08-12 ENCOUNTER — PATIENT MESSAGE (OUTPATIENT)
Dept: FAMILY MEDICINE CLINIC | Facility: CLINIC | Age: 73
End: 2023-08-12

## 2023-08-12 DIAGNOSIS — N34.2 INFECTIVE URETHRITIS: Primary | ICD-10-CM

## 2023-08-14 ENCOUNTER — APPOINTMENT (OUTPATIENT)
Dept: LAB | Facility: CLINIC | Age: 73
End: 2023-08-14
Payer: COMMERCIAL

## 2023-08-14 DIAGNOSIS — N34.2 INFECTIVE URETHRITIS: ICD-10-CM

## 2023-08-14 LAB
BACTERIA UR QL AUTO: ABNORMAL /HPF
BILIRUB UR QL STRIP: NEGATIVE
CLARITY UR: CLEAR
COLOR UR: ABNORMAL
GLUCOSE UR STRIP-MCNC: NEGATIVE MG/DL
GRAN CASTS #/AREA URNS LPF: ABNORMAL /[LPF]
HGB UR QL STRIP.AUTO: ABNORMAL
KETONES UR STRIP-MCNC: NEGATIVE MG/DL
LEUKOCYTE ESTERASE UR QL STRIP: ABNORMAL
MUCOUS THREADS UR QL AUTO: ABNORMAL
NITRITE UR QL STRIP: NEGATIVE
NON-SQ EPI CELLS URNS QL MICRO: ABNORMAL /HPF
PH UR STRIP.AUTO: 5.5 [PH]
PROT UR STRIP-MCNC: ABNORMAL MG/DL
RBC #/AREA URNS AUTO: ABNORMAL /HPF
SP GR UR STRIP.AUTO: 1.02 (ref 1–1.03)
UROBILINOGEN UR STRIP-ACNC: <2 MG/DL
WBC #/AREA URNS AUTO: ABNORMAL /HPF

## 2023-08-14 PROCEDURE — 81001 URINALYSIS AUTO W/SCOPE: CPT

## 2023-08-14 PROCEDURE — 87086 URINE CULTURE/COLONY COUNT: CPT

## 2023-08-15 LAB — BACTERIA UR CULT: NORMAL

## 2023-08-17 ENCOUNTER — TELEPHONE (OUTPATIENT)
Dept: FAMILY MEDICINE CLINIC | Facility: CLINIC | Age: 73
End: 2023-08-17

## 2023-08-18 ENCOUNTER — PATIENT OUTREACH (OUTPATIENT)
Dept: CASE MANAGEMENT | Facility: HOSPITAL | Age: 73
End: 2023-08-18

## 2023-08-18 NOTE — PROGRESS NOTES
OSW called Rosibel today. She shared Kenya Hale has been increasingly agitated and complaining about things at home. She feels he is depressed and he will not accept emotional support from her or from a counselor. He has burning with urination, however he does not have a UTI. She stated he is now complaining about their daughter Janis's cooking, when he never did that before. Because of Janis's intellectual disability, she gets upset. Sharon Holter states his daughters, Mark Rios and [facility] have stated they would like to have someone come to their home to give Kenya Hale support. OSW explained this can be done, but through hospice cares. Explained the hospice team will visit Kenya Hale and family at home to give support, however he must understand aggressive cares, hospitalizations, other doctor's appointments will end. She stated Kenya Hale has not been ready to sign on to hospice at this time. Provided active listening and emotional support. She requested to meet OSW regarding paperwork she received from the [facility]. Will plan on in-person visit on August 31st at 2 pm. She is very appreciative.

## 2023-08-21 DIAGNOSIS — G47.09 OTHER INSOMNIA: ICD-10-CM

## 2023-08-22 RX ORDER — ZOLPIDEM TARTRATE 5 MG/1
5 TABLET ORAL
Qty: 30 TABLET | Refills: 3 | Status: SHIPPED | OUTPATIENT
Start: 2023-08-22

## 2023-08-23 ENCOUNTER — HOSPITAL ENCOUNTER (OUTPATIENT)
Dept: INFUSION CENTER | Facility: HOSPITAL | Age: 73
Discharge: HOME | End: 2023-08-23
Payer: COMMERCIAL

## 2023-08-23 DIAGNOSIS — Z95.828 PORT-A-CATH IN PLACE: Primary | ICD-10-CM

## 2023-08-23 PROCEDURE — 96523 IRRIG DRUG DELIVERY DEVICE: CPT

## 2023-08-29 ENCOUNTER — APPOINTMENT (OUTPATIENT)
Dept: LAB | Facility: CLINIC | Age: 73
End: 2023-08-29
Payer: COMMERCIAL

## 2023-08-29 LAB
AMORPH URATE CRY URNS QL MICRO: ABNORMAL
BACTERIA UR QL AUTO: ABNORMAL /HPF
BILIRUB UR QL STRIP: NEGATIVE
CLARITY UR: ABNORMAL
COLOR UR: YELLOW
GLUCOSE UR STRIP-MCNC: ABNORMAL MG/DL
HGB UR QL STRIP.AUTO: ABNORMAL
HYALINE CASTS #/AREA URNS LPF: ABNORMAL /LPF
KETONES UR STRIP-MCNC: NEGATIVE MG/DL
LEUKOCYTE ESTERASE UR QL STRIP: ABNORMAL
NITRITE UR QL STRIP: NEGATIVE
NON-SQ EPI CELLS URNS QL MICRO: ABNORMAL /HPF
PH UR STRIP.AUTO: 6 [PH]
PROT UR STRIP-MCNC: ABNORMAL MG/DL
RBC #/AREA URNS AUTO: ABNORMAL /HPF
SP GR UR STRIP.AUTO: 1.02 (ref 1–1.03)
UROBILINOGEN UR STRIP-ACNC: <2 MG/DL
WBC #/AREA URNS AUTO: ABNORMAL /HPF

## 2023-08-31 ENCOUNTER — PATIENT OUTREACH (OUTPATIENT)
Dept: CASE MANAGEMENT | Facility: HOSPITAL | Age: 73
End: 2023-08-31

## 2023-08-31 NOTE — PROGRESS NOTES
OSW met with Ev Javier today as per request. Completed a new [place] Medicaid application with Ev Javier. She shared Evita Castillo has a UTI and continues to not drink many fluids. She does as much as she can with cooking and making him as comfortable as possible. He has days where he is "grouchy" and she feels is depressed but he declines counseling or therapy services. She showed recent pictures of them at their daughter's graduation from nursing school and spending time outside. Rogers's son got him an electric scooter and he uses it outside to visit his sister who lives a few blocks away. She shared his mood improved when they watched their daughter's dog while they were on vacation. Provided active listening and emotional support. OSW will continue to follow.

## 2023-09-01 ENCOUNTER — TELEPHONE (OUTPATIENT)
Age: 73
End: 2023-09-01

## 2023-09-01 DIAGNOSIS — N30.00 ACUTE CYSTITIS WITHOUT HEMATURIA: Primary | ICD-10-CM

## 2023-09-01 LAB — BACTERIA UR CULT: ABNORMAL

## 2023-09-01 RX ORDER — DOXYCYCLINE 100 MG/1
100 TABLET ORAL 2 TIMES DAILY
Qty: 14 TABLET | Refills: 0 | Status: SHIPPED | OUTPATIENT
Start: 2023-09-01 | End: 2023-09-08

## 2023-09-01 NOTE — TELEPHONE ENCOUNTER
Pt's significant other called and was informed that the script for medication was sent to the pharmacy.

## 2023-09-01 NOTE — TELEPHONE ENCOUNTER
Pt Sign. Other called [facility])  PT had urine culture done and is wondering if rx will be prescribed for URI. He wants to get on it before the holiday weekend.     [address] [facility] [phone]

## 2023-09-01 NOTE — TELEPHONE ENCOUNTER
7-day course of doxycycline sent to pharmacy.   Patient should have avoid excessive sun exposure or alcohol intake while on this therapy

## 2023-09-05 ENCOUNTER — OFFICE VISIT (OUTPATIENT)
Dept: FAMILY MEDICINE CLINIC | Facility: CLINIC | Age: 73
End: 2023-09-05
Payer: COMMERCIAL

## 2023-09-05 ENCOUNTER — TELEPHONE (OUTPATIENT)
Dept: FAMILY MEDICINE CLINIC | Facility: CLINIC | Age: 73
End: 2023-09-05

## 2023-09-05 VITALS
DIASTOLIC BLOOD PRESSURE: 50 MMHG | WEIGHT: 121 LBS | HEIGHT: 64 IN | HEART RATE: 78 BPM | OXYGEN SATURATION: 99 % | RESPIRATION RATE: 16 BRPM | SYSTOLIC BLOOD PRESSURE: 110 MMHG | BODY MASS INDEX: 20.66 KG/M2 | TEMPERATURE: 97.3 F

## 2023-09-05 DIAGNOSIS — R63.0 POOR APPETITE: ICD-10-CM

## 2023-09-05 DIAGNOSIS — N13.30 HYDRONEPHROSIS OF RIGHT KIDNEY: ICD-10-CM

## 2023-09-05 DIAGNOSIS — R06.02 SOB (SHORTNESS OF BREATH): ICD-10-CM

## 2023-09-05 DIAGNOSIS — R13.10 DYSPHAGIA, UNSPECIFIED TYPE: ICD-10-CM

## 2023-09-05 DIAGNOSIS — Z79.4 TYPE 2 DIABETES MELLITUS WITH DIABETIC PERIPHERAL ANGIOPATHY WITHOUT GANGRENE, WITH LONG-TERM CURRENT USE OF INSULIN (HCC): ICD-10-CM

## 2023-09-05 DIAGNOSIS — R10.84 GENERALIZED ABDOMINAL PAIN: ICD-10-CM

## 2023-09-05 DIAGNOSIS — R53.1 GENERALIZED WEAKNESS: ICD-10-CM

## 2023-09-05 DIAGNOSIS — E11.51 TYPE 2 DIABETES MELLITUS WITH DIABETIC PERIPHERAL ANGIOPATHY WITHOUT GANGRENE, WITH LONG-TERM CURRENT USE OF INSULIN (HCC): ICD-10-CM

## 2023-09-05 DIAGNOSIS — D49.4 BLADDER TUMOR: Primary | ICD-10-CM

## 2023-09-05 DIAGNOSIS — E44.1 MILD PROTEIN-CALORIE MALNUTRITION (HCC): ICD-10-CM

## 2023-09-05 PROCEDURE — 99214 OFFICE O/P EST MOD 30 MIN: CPT | Performed by: FAMILY MEDICINE

## 2023-09-05 RX ORDER — PANTOPRAZOLE SODIUM 40 MG/1
40 TABLET, DELAYED RELEASE ORAL DAILY
Qty: 30 TABLET | Refills: 1 | Status: SHIPPED | OUTPATIENT
Start: 2023-09-05

## 2023-09-05 RX ORDER — SUCRALFATE 1 G/1
1 TABLET ORAL 3 TIMES DAILY PRN
Qty: 90 TABLET | Refills: 0 | Status: SHIPPED | OUTPATIENT
Start: 2023-09-05

## 2023-09-05 NOTE — PROGRESS NOTES
Name: Shaun Ellison      : 1950      MRN: [id number]  Encounter Provider: Germán Aaron MD  Encounter Date: 2023   Encounter department: [facility] today with for follow up. Is feeling better. Saw palliative and was started on Pamelor. Was also recently diagnosed with UTI and prescribed doxycycline. Appetite has improved and gained 4 lbs in the past month. Diarrhea has also subsided. Complains of dysphagia to solids and pain in the epigastric area along with suprapubic tenderness and RLQ pain. Was on PPI and Carafate before but was stopped. Pt may be experiencing spasms while eating. Did offer a swallow evaluation but declined. Asked to eat sitting up, avoid laying down 1 hour after eating, take smaller bites, and drink a sip of water in between each bite to help soften the food. Neuropathy has also improved with adding Pamelor. SOB has improved. Overall, patient appears to be doing better. Due for AWV. Asked to f/u with PCP or myself in December. 1. Bladder tumor    2. Hydronephrosis of right kidney    3. Generalized weakness    4. SOB (shortness of breath)    5. Poor appetite  -     sucralfate (CARAFATE) 1 g tablet; Take 1 tablet (1 g total) by mouth 3 (three) times a day as needed (abdominal pain or bloating)    6. Dysphagia, unspecified type    7. Generalized abdominal pain  -     pantoprazole (PROTONIX) 40 mg tablet; Take 1 tablet (40 mg total) by mouth daily    8. Mild protein-calorie malnutrition ([address])    9. Type 2 diabetes mellitus with diabetic peripheral angiopathy without gangrene, with long-term current use of insulin (HCC)  -     Albumin / creatinine urine ratio; Future; Expected date: 10/05/2023  -     HEMOGLOBIN A1C W/ EAG ESTIMATION; Future; Expected date: 10/05/2023         Subjective      Seen today for follow up.  1 month ago he was seen with decrease alertness, diarrhea, ongoing weight loss, and poor appetite in the setting of bladder cancer. Referred him to palliative as he did not want any further cancer related work up or treatment. He saw palliatve who discussed GOC and started pamelor to help neuropathic pain. He is seen today for follow up. Gained 4lbs since August 1st. OIn antibiotic for UTI. Appetite has improved. No longer having issues breathing. Does fatigue easily. No chest pain. Does get dizzy on occaison. Diarrhea has subsided. Does have trouble swallowing solids. Feels like the food gets stuck. Review of Systems   Constitutional: Negative for appetite change, fever and unexpected weight change. HENT: Positive for trouble swallowing. Respiratory: Positive for chest tightness (whenever he eats solids). Negative for choking and shortness of breath. Cardiovascular: Negative for chest pain, palpitations and leg swelling. Gastrointestinal: Positive for abdominal distention and abdominal pain. Bloating   Genitourinary: Positive for difficulty urinating and frequency (improving). Neurological: Positive for light-headedness. Current Outpatient Medications on File Prior to Visit   Medication Sig   • albuterol (Ventolin HFA) 90 mcg/act inhaler Inhale 2 puffs every 6 (six) hours as needed for wheezing or shortness of breath   • Alcohol Swabs (B-D SINGLE USE SWABS REGULAR) PADS USE 2-3 TIMES DAILY AS NEEDED   • Blood Glucose Calibration (OT ULTRA/FASTTK CNTRL SOLN) SOLN USE AS DIRECTED   • Comfort EZ Pen Needles 32G X 4 MM MISC USE 3-4 AS DIRECTED   • Continuous Blood Gluc  (FreeStyle Frank 14 Day [place]) NATALIA Use 1 Device continuous   • doxycycline (ADOXA) 100 MG tablet Take 1 tablet (100 mg total) by mouth 2 (two) times a day for 7 days   • Lancet Devices (Lancing Device) MISC USE AS DIRECTED   • Lancets (OneTouch Delica Plus [id number]) MISC USE 3-4 TIMES AS DIRECTED.    • loperamide (IMODIUM) 2 mg capsule Take 1 capsule (2 mg total) by mouth 2 (two) times a day as needed for diarrhea [employer] tableta dos veces por cynthia si tiene diarrea   • nortriptyline (PAMELOR) 10 mg capsule Take 1 capsule (10 mg total) by mouth daily at bedtime   • OneTouch Ultra test strip TEST 3-4 TIMES A DAY   • oxybutynin (DITROPAN) 5 mg tablet Take 1 tablet (5 mg total) by mouth 2 (two) times a day as needed (bladder spasms)   • pregabalin (LYRICA) 75 mg capsule Take 1 capsule (75 mg total) by mouth 2 (two) times a day   • rosuvastatin (CRESTOR) 40 MG tablet TAKE ONE (1) TABLET BY MOUTH DAILY   • tacrolimus (PROGRAF) 1 mg capsule TAKE 1 CAPSULE (1 MG TOTAL) BY MOUTH EVERY 12 (TWELVE) HOURS   • temazepam (RESTORIL) 7.5 mg capsule TAKE 2 CAPSULES (15 MG TOTAL) BY MOUTH DAILY AT BEDTIME AS NEEDED FOR SLEEP   • zolpidem (AMBIEN) 5 mg tablet TAKE 1 TABLET (5 MG TOTAL) BY MOUTH DAILY AT BEDTIME AS NEEDED FOR SLEEP   • [DISCONTINUED] pantoprazole (PROTONIX) 40 mg tablet Take 1 tablet (40 mg total) by mouth daily   • acetaminophen (TYLENOL) 325 mg tablet Take 2 tablets (650 mg total) by mouth every 6 (six) hours as needed for mild pain (Patient not taking: Reported on 9/5/2023)   • Incontinence Supply Disposable (Incontinence Brief Medium) MISC Use 4 (four) times a day   • tamsulosin (FLOMAX) 0.4 mg Take 1 capsule (0.4 mg total) by mouth daily with dinner   • [DISCONTINUED] sucralfate (CARAFATE) 1 g tablet Take 1 tablet (1 g total) by mouth 4 (four) times a day (Patient not taking: Reported on 9/5/2023)       Objective     /50 (BP Location: Left arm, Patient Position: Sitting, Cuff Size: Standard)   Pulse 78   Temp (!) 97.3 °F (36.3 °C) (Tympanic)   Resp 16   Ht 5' 4" (1.626 m)   Wt 54.9 kg (121 lb)   SpO2 99%   BMI 20.77 kg/m²     Physical Exam  Vitals reviewed. Constitutional:       General: He is not in acute distress. Appearance: Normal appearance. He is not ill-appearing or toxic-appearing. HENT:      Head: Normocephalic and atraumatic.       Right Ear: Tympanic membrane normal.      Left Ear: Tympanic membrane normal. Mouth/Throat:      Mouth: Mucous membranes are moist.      Comments: Dentures present   Eyes:      Extraocular Movements: Extraocular movements intact. Abdominal:      General: A surgical scar is present. There is no distension. Palpations: Abdomen is soft. There is no mass. Tenderness: There is abdominal tenderness in the right lower quadrant, epigastric area, periumbilical area and suprapubic area. There is no guarding or rebound. Hernia: No hernia is present. Musculoskeletal:      Comments: exoriated skin on both forearms  Multiple bruises and ecchymosis on the arms and legs    Skin:     General: Skin is warm. Coloration: Skin is not jaundiced. Findings: Bruising and lesion present. Neurological:      Mental Status: He is alert and oriented to person, place, and time. Psychiatric:         Mood and Affect: Mood normal.         Behavior: Behavior normal.         Thought Content:  Thought content normal.       Emilia Ruiz MD

## 2023-09-05 NOTE — TELEPHONE ENCOUNTER
Hi Dr Sindhu Noriega,    Just a reminder to please put in HgA1c for patient due for November.     Thank you

## 2023-09-06 DIAGNOSIS — N40.1 BENIGN LOCALIZED PROSTATIC HYPERPLASIA WITH LOWER URINARY TRACT SYMPTOMS (LUTS): ICD-10-CM

## 2023-09-06 RX ORDER — TAMSULOSIN HYDROCHLORIDE 0.4 MG/1
0.4 CAPSULE ORAL
Qty: 90 CAPSULE | Refills: 3 | Status: SHIPPED | OUTPATIENT
Start: 2023-09-06 | End: 2023-12-05

## 2023-09-07 DIAGNOSIS — Z79.4 CONTROLLED TYPE 2 DIABETES MELLITUS WITH DIABETIC NEUROPATHY, WITH LONG-TERM CURRENT USE OF INSULIN (HCC): ICD-10-CM

## 2023-09-07 DIAGNOSIS — E11.40 CONTROLLED TYPE 2 DIABETES MELLITUS WITH DIABETIC NEUROPATHY, WITH LONG-TERM CURRENT USE OF INSULIN (HCC): ICD-10-CM

## 2023-09-07 RX ORDER — PEN NEEDLE, DIABETIC 32GX 5/32"
NEEDLE, DISPOSABLE MISCELLANEOUS
Qty: 100 EACH | Refills: 5 | Status: SHIPPED | OUTPATIENT
Start: 2023-09-07

## 2023-09-07 RX ORDER — BLOOD-GLUCOSE CONTROL, NORMAL
EACH MISCELLANEOUS
Qty: 1 EACH | Refills: 0 | Status: SHIPPED | OUTPATIENT
Start: 2023-09-07

## 2023-09-07 RX ORDER — LANCETS 30 GAUGE
EACH MISCELLANEOUS
Qty: 300 EACH | Refills: 5 | Status: SHIPPED | OUTPATIENT
Start: 2023-09-07

## 2023-09-12 DIAGNOSIS — Z94.4 LIVER TRANSPLANTED (HCC): ICD-10-CM

## 2023-09-12 RX ORDER — TACROLIMUS 1 MG/1
1 CAPSULE ORAL EVERY 12 HOURS
Qty: 180 CAPSULE | Refills: 3 | Status: SHIPPED | OUTPATIENT
Start: 2023-09-12

## 2023-09-15 ENCOUNTER — OFFICE VISIT (OUTPATIENT)
Dept: PALLIATIVE MEDICINE | Facility: CLINIC | Age: 73
End: 2023-09-15
Payer: COMMERCIAL

## 2023-09-15 VITALS
BODY MASS INDEX: 20.78 KG/M2 | OXYGEN SATURATION: 96 % | HEIGHT: 64 IN | SYSTOLIC BLOOD PRESSURE: 100 MMHG | WEIGHT: 121.69 LBS | TEMPERATURE: 95.8 F | DIASTOLIC BLOOD PRESSURE: 58 MMHG | HEART RATE: 52 BPM

## 2023-09-15 DIAGNOSIS — G57.93 NEUROPATHIC PAIN OF BOTH LEGS: ICD-10-CM

## 2023-09-15 DIAGNOSIS — C78.00 BLADDER CANCER METASTASIZED TO LUNG (HCC): Primary | ICD-10-CM

## 2023-09-15 DIAGNOSIS — R19.7 DIARRHEA, UNSPECIFIED TYPE: ICD-10-CM

## 2023-09-15 DIAGNOSIS — C67.9 BLADDER CANCER METASTASIZED TO LUNG (HCC): Primary | ICD-10-CM

## 2023-09-15 DIAGNOSIS — Z51.5 ENCOUNTER FOR PALLIATIVE CARE: ICD-10-CM

## 2023-09-15 DIAGNOSIS — N30.00 ACUTE CYSTITIS WITHOUT HEMATURIA: Primary | ICD-10-CM

## 2023-09-15 PROCEDURE — 99214 OFFICE O/P EST MOD 30 MIN: CPT | Performed by: STUDENT IN AN ORGANIZED HEALTH CARE EDUCATION/TRAINING PROGRAM

## 2023-09-15 RX ORDER — NORTRIPTYLINE HYDROCHLORIDE 10 MG/1
10 CAPSULE ORAL
Qty: 90 CAPSULE | Refills: 0 | Status: SHIPPED | OUTPATIENT
Start: 2023-09-15

## 2023-09-15 RX ORDER — LOPERAMIDE HYDROCHLORIDE 2 MG/1
2 CAPSULE ORAL 2 TIMES DAILY PRN
Qty: 90 CAPSULE | Refills: 3 | Status: SHIPPED | OUTPATIENT
Start: 2023-09-15

## 2023-09-15 NOTE — PROGRESS NOTES
Outpatient Follow-Up - Palliative and Supportive Care   [place] Mail 74 y.o. male [id number]    Assessment & Plan    1. Bladder cancer 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 ([address])  2. Encounter for palliative care  - Progressive fatigue, dyspnea, and lightheadedness with exertion likely related to disease progression. Encouraged him to listen to his body and not push past his limits. Recommended increased consumption of fluids to prevent orthostasis. 3. Neuropathic pain of both legs  - Improved, continue nortriptyline (PAMELOR) 10 mg capsule; Take 1 capsule (10 mg total) by mouth daily at bedtime  Dispense: 90 capsule; Refill: 0    4. Diarrhea, unspecified type  - Stable, continue loperamide (IMODIUM) 2 mg capsule; Take 1 capsule (2 mg total) by mouth 2 (two) times a day as needed for diarrhea [employer] veces por cynthia si tiene diarrea  Dispense: 90 capsule; Refill: 3      [place] was seen today for symptoms and planning cares related to above illnesses. I have reviewed the patient's controlled substance dispensing history in the Prescription Drug Monitoring Program in compliance with the [facility] regulations before prescribing any controlled substances. They are invited to continue to follow with us. If there are questions or concerns, please contact us through our clinic/answering service 24 hours a day, seven days a week. Rosas Chadwick MD  [facility]  [phone]      Visit Information    Accompanied By: Significant other    Source of History: Self, Significant other    History Limitations: Maltese is first language, near fluent in [facility]. Prefers to utilize significant other as  when needed. Contacts: On file    Chief Complaint  Chief Complaint   Patient presents with   • Follow-up   • Shortness of Breath   • Dizziness     History of Present Illness      Royal Schultz is a 74 y.o. male who presents in follow up of symptoms related to bladder cancer metastatic to lung. Pertinent issues include: symptom management, breathlessness, fatigue, disease process education and discussion of prognosis    Notes increasing fatigue/dyspnea with even minimal exertion  Associated lightheadedness that improves with rest  Improved neuropathy, appetite, and sleep since starting pamelor  Still with intermittent diarrhea relieved by immodium  No significant pain, nausea/vomiting    Past medical, surgical, social, and family histories are reviewed and pertinent updates are made. ROS as per HPI    Vital Signs    /58 (BP Location: Left arm, Patient Position: Sitting, Cuff Size: Standard)   Pulse (!) 52   Temp (!) 95.8 °F (35.4 °C) (Temporal)   Ht 5' 4" (1.626 m)   Wt 55.2 kg (121 lb 11.1 oz)   SpO2 96%   BMI 20.89 kg/m²     Physical Exam and Objective Data  Physical Exam  Constitutional:       General: He is not in acute distress. HENT:      Head: Normocephalic and atraumatic. Mouth/Throat:      Mouth: Mucous membranes are moist.   Eyes:      Extraocular Movements: Extraocular movements intact. Cardiovascular:      Rate and Rhythm: Normal rate. Pulmonary:      Effort: Pulmonary effort is normal. No respiratory distress. Abdominal:      Palpations: Abdomen is soft. Tenderness: There is no abdominal tenderness. Musculoskeletal:      Right lower leg: No edema. Left lower leg: No edema. Skin:     General: Skin is warm and dry. Coloration: Skin is not pale. Neurological:      Mental Status: He is alert.    Psychiatric:         Mood and Affect: Mood normal.         Behavior: Behavior normal.       Radiology and Laboratory:  I personally reviewed and interpreted the following results: Urine cx 8/29    30 minutes was spent face to face with Francheska Gleason and his partner with greater than 50% of the time spent in counseling or coordination of care including discussions of prognosis of diagnosis, risks and benefits of treatment, instructions for disease self management, follow up requirements and patient and family counseling/involvement in care. All of the patient's or agent's questions were answered during this discussion.

## 2023-09-21 ENCOUNTER — APPOINTMENT (OUTPATIENT)
Dept: LAB | Facility: CLINIC | Age: 73
End: 2023-09-21
Payer: COMMERCIAL

## 2023-09-21 ENCOUNTER — PATIENT OUTREACH (OUTPATIENT)
Dept: CASE MANAGEMENT | Facility: HOSPITAL | Age: 73
End: 2023-09-21

## 2023-09-21 DIAGNOSIS — Z79.4 TYPE 2 DIABETES MELLITUS WITH DIABETIC PERIPHERAL ANGIOPATHY WITHOUT GANGRENE, WITH LONG-TERM CURRENT USE OF INSULIN (HCC): ICD-10-CM

## 2023-09-21 DIAGNOSIS — E11.51 TYPE 2 DIABETES MELLITUS WITH DIABETIC PERIPHERAL ANGIOPATHY WITHOUT GANGRENE, WITH LONG-TERM CURRENT USE OF INSULIN (HCC): ICD-10-CM

## 2023-09-21 LAB
BACTERIA UR QL AUTO: ABNORMAL /HPF
BILIRUB UR QL STRIP: NEGATIVE
CLARITY UR: CLEAR
COLOR UR: COLORLESS
CREAT UR-MCNC: 81.9 MG/DL
GLUCOSE UR STRIP-MCNC: ABNORMAL MG/DL
HGB UR QL STRIP.AUTO: ABNORMAL
KETONES UR STRIP-MCNC: NEGATIVE MG/DL
LEUKOCYTE ESTERASE UR QL STRIP: ABNORMAL
MICROALBUMIN UR-MCNC: 79 MG/L
MICROALBUMIN/CREAT 24H UR: 96 MG/G CREATININE (ref 0–30)
NITRITE UR QL STRIP: NEGATIVE
NON-SQ EPI CELLS URNS QL MICRO: ABNORMAL /HPF
PH UR STRIP.AUTO: 6 [PH]
PROT UR STRIP-MCNC: ABNORMAL MG/DL
RBC #/AREA URNS AUTO: ABNORMAL /HPF
SP GR UR STRIP.AUTO: 1.01 (ref 1–1.03)
UROBILINOGEN UR STRIP-ACNC: <2 MG/DL
WBC #/AREA URNS AUTO: ABNORMAL /HPF

## 2023-09-21 PROCEDURE — 82570 ASSAY OF URINE CREATININE: CPT

## 2023-09-21 PROCEDURE — 82043 UR ALBUMIN QUANTITATIVE: CPT

## 2023-09-21 PROCEDURE — 81001 URINALYSIS AUTO W/SCOPE: CPT

## 2023-09-21 PROCEDURE — 87086 URINE CULTURE/COLONY COUNT: CPT

## 2023-09-21 NOTE — PROGRESS NOTES
OSW called Pinky Mack today, however Romulo Brochure answered placed OSW on hold. He returned and stated stated Pinky Mack was unavailable. He requested a call back at another time. Will schedule a call next week.

## 2023-09-22 LAB — BACTERIA UR CULT: NORMAL

## 2023-09-26 ENCOUNTER — OFFICE VISIT (OUTPATIENT)
Dept: NEPHROLOGY | Facility: CLINIC | Age: 73
End: 2023-09-26
Payer: COMMERCIAL

## 2023-09-26 VITALS
HEART RATE: 70 BPM | HEIGHT: 64 IN | DIASTOLIC BLOOD PRESSURE: 68 MMHG | WEIGHT: 119 LBS | OXYGEN SATURATION: 99 % | SYSTOLIC BLOOD PRESSURE: 120 MMHG | BODY MASS INDEX: 20.32 KG/M2

## 2023-09-26 DIAGNOSIS — N18.9 CHRONIC KIDNEY DISEASE, UNSPECIFIED CKD STAGE: Primary | ICD-10-CM

## 2023-09-26 DIAGNOSIS — E11.40 CONTROLLED TYPE 2 DIABETES MELLITUS WITH DIABETIC NEUROPATHY, WITH LONG-TERM CURRENT USE OF INSULIN (HCC): Chronic | ICD-10-CM

## 2023-09-26 DIAGNOSIS — E78.2 MIXED HYPERLIPIDEMIA: ICD-10-CM

## 2023-09-26 DIAGNOSIS — R42 VERTIGO: ICD-10-CM

## 2023-09-26 DIAGNOSIS — Z79.4 CONTROLLED TYPE 2 DIABETES MELLITUS WITH DIABETIC NEUROPATHY, WITH LONG-TERM CURRENT USE OF INSULIN (HCC): Chronic | ICD-10-CM

## 2023-09-26 PROCEDURE — 99214 OFFICE O/P EST MOD 30 MIN: CPT | Performed by: INTERNAL MEDICINE

## 2023-09-26 RX ORDER — PREGABALIN 75 MG/1
75 CAPSULE ORAL 2 TIMES DAILY
Qty: 60 CAPSULE | Refills: 5 | Status: SHIPPED | OUTPATIENT
Start: 2023-09-26

## 2023-09-26 RX ORDER — MECLIZINE HCL 12.5 MG/1
25 TABLET ORAL EVERY 12 HOURS PRN
Qty: 30 TABLET | Refills: 0 | Status: SHIPPED | OUTPATIENT
Start: 2023-09-26

## 2023-09-26 RX ORDER — ROSUVASTATIN CALCIUM 40 MG/1
TABLET, COATED ORAL
Qty: 30 TABLET | Refills: 5 | Status: SHIPPED | OUTPATIENT
Start: 2023-09-26

## 2023-09-26 NOTE — LETTER
September 26, 2023     Sher [place], [address] [address]    Patient: Ac Daniels   YOB: 1950   Date of Visit: 9/26/2023       Dear Dr. Dixon Mems:    Thank you for referring Ac Daniels to me for evaluation. Below are my notes for this consultation. If you have questions, please do not hesitate to call me. I look forward to following your patient along with you. Sincerely,        Melissa Mendoza MD        CC: No Recipients    Melissa Mendoza MD  9/26/2023  4:44 PM  Sign when Signing Visit  NEPHROLOGY PROGRESS NOTE    Ac Daniels 74 y.o. male MRN: [id number]  Unit/Bed#:  Encounter: [id number]  Reason for Consult: Chronic kidney disease    The patient is here with his wife for routine follow-up. He looks like he has been doing okay says he is maintaining his weight but his biggest complaint is fatigue and he gets tired with activity. He has no significant cough no fever but he has had some urinary tract infections. Otherwise I have known the patient well he is in decent spirits and had no other complaints except dizziness at times. ASSESSMENT/PLAN:  1. Renal    Patient has chronic kidney disease and his creatinine latest is 3.6. Prior to his diagnosis of bladder cancer with obstruction creatinine was running in the twos. I suspect this is elevated potentially due to obstructive uropathy and at this point as far as we know his obstruction on the right is relieved as years of chronic stent in place. Potassium is normal at 4.8. Hemoglobin is 8.2. Patient is relatively stable no volume overload blood pressures under good control as well. We will continue with follow-up in 4 months with repeat labs and have asked him to call if there is any problems or concerns before next visit.     2.  Metastatic bladder cancer    Patient completed some preliminary treatment with hematology oncology but at this point he is got progressive disease and it appears there is really no further treatment options per oncology as what the patient and his wife told me. SUBJECTIVE:  Review of Systems   Constitutional: Positive for malaise/fatigue. Negative for chills, diaphoresis, fever and night sweats. HENT: Negative. Eyes: Negative. Cardiovascular: Negative. Negative for chest pain, dyspnea on exertion, leg swelling and orthopnea. Respiratory: Negative. Negative for cough, shortness of breath, sputum production and wheezing. Gastrointestinal: Negative for abdominal pain, diarrhea, nausea and vomiting. Neurological: Negative for dizziness, focal weakness, headaches and weakness. Occasional vertigo. Psychiatric/Behavioral: Negative for altered mental status, hallucinations and hypervigilance. The patient is not nervous/anxious. OBJECTIVE:  Current Weight: Weight - Scale: 54 kg (119 lb)  [email or web address]:     Height 5' 4" (1.626 m), weight 54 kg (119 lb). , Body mass index is 20.43 kg/m². [unfilled]    Physical Exam: Ht 5' 4" (1.626 m)   Wt 54 kg (119 lb)   BMI 20.43 kg/m²   Physical Exam  Constitutional:       General: He is not in acute distress. Appearance: He is not toxic-appearing or diaphoretic. HENT:      Head: Normocephalic and atraumatic. Nose: Nose normal.      Mouth/Throat:      Mouth: Mucous membranes are moist.   Eyes:      General: No scleral icterus. Extraocular Movements: Extraocular movements intact. Cardiovascular:      Rate and Rhythm: Normal rate and regular rhythm. Heart sounds: No friction rub. No gallop. Comments: No edema  Pulmonary:      Effort: Pulmonary effort is normal. No respiratory distress. Breath sounds: No wheezing, rhonchi or rales. Abdominal:      General: Bowel sounds are normal. There is no distension. Palpations: Abdomen is soft. Tenderness: There is no abdominal tenderness. There is no rebound.    Musculoskeletal:      Cervical back: Normal range of motion and neck supple. Neurological:      General: No focal deficit present. Mental Status: He is alert and oriented to person, place, and time. Mental status is at baseline. Psychiatric:         Mood and Affect: Mood normal.         Behavior: Behavior normal.         Thought Content:  Thought content normal.         Judgment: Judgment normal.         Medications:    Current Outpatient Medications:   •  acetaminophen (TYLENOL) 325 mg tablet, Take 2 tablets (650 mg total) by mouth every 6 (six) hours as needed for mild pain, Disp: , Rfl: 0  •  albuterol (Ventolin HFA) 90 mcg/act inhaler, Inhale 2 puffs every 6 (six) hours as needed for wheezing or shortness of breath, Disp: 18 g, Rfl: 5  •  Alcohol Swabs (B-D SINGLE USE SWABS REGULAR) PADS, USE 2-3 TIMES DAILY AS NEEDED, Disp: , Rfl:   •  BD Pen Needle Betsy 2nd Gen 32G X 4 MM MISC, USE 3-4 AS DIRECTED, Disp: 100 each, Rfl: 5  •  Blood Glucose Calibration (OT ULTRA/FASTTK CNTRL SOLN) SOLN, USE AS DIRECTED, Disp: 1 each, Rfl: 0  •  Continuous Blood Gluc  (FreeStyle Frank 14 Day [place]) NATALIA, Use 1 Device continuous, Disp: 1 each, Rfl: 0  •  Incontinence Supply Disposable (Incontinence Brief Medium) MISC, Use 4 (four) times a day, Disp: 120 each, Rfl: 0  •  Lancet Devices (Lancing Device) MISC, USE AS DIRECTED, Disp: 1 each, Rfl: 0  •  Lancets (OneTouch Delica Plus [id number]) MISC, USE 3-4 TIMES AS DIRECTED., Disp: 300 each, Rfl: 5  •  loperamide (IMODIUM) 2 mg capsule, Take 1 capsule (2 mg total) by mouth 2 (two) times a day as needed for diarrhea [place] [employer] tableta dos veces por cynthia si tiene diarrea, Disp: 90 capsule, Rfl: 3  •  nortriptyline (PAMELOR) 10 mg capsule, Take 1 capsule (10 mg total) by mouth daily at bedtime, Disp: 90 capsule, Rfl: 0  •  OneTouch Ultra test strip, TEST 3-4 TIMES A DAY, Disp: 100 each, Rfl: 4  •  oxybutynin (DITROPAN) 5 mg tablet, Take 1 tablet (5 mg total) by mouth 2 (two) times a day as needed (bladder spasms), Disp: 180 tablet, Rfl: 3  •  pantoprazole (PROTONIX) 40 mg tablet, Take 1 tablet (40 mg total) by mouth daily, Disp: 30 tablet, Rfl: 1  •  pregabalin (LYRICA) 75 mg capsule, Take 1 capsule (75 mg total) by mouth 2 (two) times a day, Disp: 60 capsule, Rfl: 5  •  rosuvastatin (CRESTOR) 40 MG tablet, TAKE ONE (1) TABLET BY MOUTH DAILY, Disp: 30 tablet, Rfl: 5  •  sucralfate (CARAFATE) 1 g tablet, Take 1 tablet (1 g total) by mouth 3 (three) times a day as needed (abdominal pain or bloating), Disp: 90 tablet, Rfl: 0  •  tacrolimus (PROGRAF) 1 mg capsule, TAKE 1 CAPSULE (1 MG TOTAL) BY MOUTH EVERY 12 (TWELVE) HOURS, Disp: 180 capsule, Rfl: 3  •  tamsulosin (FLOMAX) 0.4 mg, TAKE 1 CAPSULE (0.4 MG TOTAL) BY MOUTH DAILY WITH DINNER, Disp: 90 capsule, Rfl: 3  •  temazepam (RESTORIL) 7.5 mg capsule, TAKE 2 CAPSULES (15 MG TOTAL) BY MOUTH DAILY AT BEDTIME AS NEEDED FOR SLEEP, Disp: 30 capsule, Rfl: 5  •  zolpidem (AMBIEN) 5 mg tablet, TAKE 1 TABLET (5 MG TOTAL) BY MOUTH DAILY AT BEDTIME AS NEEDED FOR SLEEP, Disp: 30 tablet, Rfl: 3    Laboratory Results:  Lab Results   Component Value Date    WBC 5.34 07/31/2023    HGB 8.2 (L) 07/31/2023    HCT 26.7 (L) 07/31/2023    MCV 77 (L) 07/31/2023    PLT 91 (L) 07/31/2023     Lab Results   Component Value Date    SODIUM 138 08/01/2023    K 4.8 08/01/2023     (H) 08/01/2023    CO2 19 (L) 08/01/2023    BUN 45 (H) 08/01/2023    CREATININE 3.66 (H) 08/01/2023    GLUC 173 (H) 08/01/2023    CALCIUM 9.3 08/01/2023     Lab Results   Component Value Date    CALCIUM 9.3 08/01/2023    PHOS 2.5 10/13/2022     No results found for: "LABPROT"

## 2023-09-26 NOTE — PATIENT INSTRUCTIONS
You are here for follow-up glad to hear you are feeling okay although you are fatigued and get tired easily. I do not detect any fluid in your lungs and you are not swollen. The creatinine which is the blood test for the kidney function is relatively stable at 3.7. Blood pressures under good control. You are getting symptoms of dizziness or head spinning at times this could be vertigo so I sent in a pill called meclizine that you can take as needed. If it makes you tired you can break it in half and take half of a tablet as needed. Labs and follow-up as scheduled please call me if you have any questions or problems before next visit.

## 2023-09-26 NOTE — PROGRESS NOTES
NEPHROLOGY PROGRESS NOTE    Ac Daniels 74 y.o. male MRN: [id number]  Unit/Bed#:  Encounter: [id number]  Reason for Consult: Chronic kidney disease    The patient is here with his wife for routine follow-up. He looks like he has been doing okay says he is maintaining his weight but his biggest complaint is fatigue and he gets tired with activity. He has no significant cough no fever but he has had some urinary tract infections. Otherwise I have known the patient well he is in decent spirits and had no other complaints except dizziness at times. ASSESSMENT/PLAN:  1. Renal    Patient has chronic kidney disease and his creatinine latest is 3.6. Prior to his diagnosis of bladder cancer with obstruction creatinine was running in the twos. I suspect this is elevated potentially due to obstructive uropathy and at this point as far as we know his obstruction on the right is relieved as years of chronic stent in place. Potassium is normal at 4.8. Hemoglobin is 8.2. Patient is relatively stable no volume overload blood pressures under good control as well. We will continue with follow-up in 4 months with repeat labs and have asked him to call if there is any problems or concerns before next visit. 2.  Metastatic bladder cancer    Patient completed some preliminary treatment with hematology oncology but at this point he is got progressive disease and it appears there is really no further treatment options per oncology as what the patient and his wife told me. SUBJECTIVE:  Review of Systems   Constitutional: Positive for malaise/fatigue. Negative for chills, diaphoresis, fever and night sweats. HENT: Negative. Eyes: Negative. Cardiovascular: Negative. Negative for chest pain, dyspnea on exertion, leg swelling and orthopnea. Respiratory: Negative. Negative for cough, shortness of breath, sputum production and wheezing.     Gastrointestinal: Negative for abdominal pain, diarrhea, nausea and vomiting. Neurological: Negative for dizziness, focal weakness, headaches and weakness. Occasional vertigo. Psychiatric/Behavioral: Negative for altered mental status, hallucinations and hypervigilance. The patient is not nervous/anxious. OBJECTIVE:  Current Weight: Weight - Scale: 54 kg (119 lb)  [email or web address]:     Height 5' 4" (1.626 m), weight 54 kg (119 lb). , Body mass index is 20.43 kg/m². [unfilled]    Physical Exam: Ht 5' 4" (1.626 m)   Wt 54 kg (119 lb)   BMI 20.43 kg/m²   Physical Exam  Constitutional:       General: He is not in acute distress. Appearance: He is not toxic-appearing or diaphoretic. HENT:      Head: Normocephalic and atraumatic. Nose: Nose normal.      Mouth/Throat:      Mouth: Mucous membranes are moist.   Eyes:      General: No scleral icterus. Extraocular Movements: Extraocular movements intact. Cardiovascular:      Rate and Rhythm: Normal rate and regular rhythm. Heart sounds: No friction rub. No gallop. Comments: No edema  Pulmonary:      Effort: Pulmonary effort is normal. No respiratory distress. Breath sounds: No wheezing, rhonchi or rales. Abdominal:      General: Bowel sounds are normal. There is no distension. Palpations: Abdomen is soft. Tenderness: There is no abdominal tenderness. There is no rebound. Musculoskeletal:      Cervical back: Normal range of motion and neck supple. Neurological:      General: No focal deficit present. Mental Status: He is alert and oriented to person, place, and time. Mental status is at baseline. Psychiatric:         Mood and Affect: Mood normal.         Behavior: Behavior normal.         Thought Content:  Thought content normal.         Judgment: Judgment normal.         Medications:    Current Outpatient Medications:   •  acetaminophen (TYLENOL) 325 mg tablet, Take 2 tablets (650 mg total) by mouth every 6 (six) hours as needed for mild pain, Disp: , Rfl: 0  •  albuterol (Ventolin HFA) 90 mcg/act inhaler, Inhale 2 puffs every 6 (six) hours as needed for wheezing or shortness of breath, Disp: 18 g, Rfl: 5  •  Alcohol Swabs (B-D SINGLE USE SWABS REGULAR) PADS, USE 2-3 TIMES DAILY AS NEEDED, Disp: , Rfl:   •  BD Pen Needle Betsy 2nd Gen 32G X 4 MM MISC, USE 3-4 AS DIRECTED, Disp: 100 each, Rfl: 5  •  Blood Glucose Calibration (OT ULTRA/FASTTK CNTRL SOLN) SOLN, USE AS DIRECTED, Disp: 1 each, Rfl: 0  •  Continuous Blood Gluc  (FreeStyle Frank 14 Day [place]) NATALIA, Use 1 Device continuous, Disp: 1 each, Rfl: 0  •  Incontinence Supply Disposable (Incontinence Brief Medium) MISC, Use 4 (four) times a day, Disp: 120 each, Rfl: 0  •  Lancet Devices (Lancing Device) MISC, USE AS DIRECTED, Disp: 1 each, Rfl: 0  •  Lancets (OneTouch Delica Plus [id number]) MISC, USE 3-4 TIMES AS DIRECTED., Disp: 300 each, Rfl: 5  •  loperamide (IMODIUM) 2 mg capsule, Take 1 capsule (2 mg total) by mouth 2 (two) times a day as needed for diarrhea [place] carmen tableta dos veces por cynthia si tiene diarrea, Disp: 90 capsule, Rfl: 3  •  nortriptyline (PAMELOR) 10 mg capsule, Take 1 capsule (10 mg total) by mouth daily at bedtime, Disp: 90 capsule, Rfl: 0  •  OneTouch Ultra test strip, TEST 3-4 TIMES A DAY, Disp: 100 each, Rfl: 4  •  oxybutynin (DITROPAN) 5 mg tablet, Take 1 tablet (5 mg total) by mouth 2 (two) times a day as needed (bladder spasms), Disp: 180 tablet, Rfl: 3  •  pantoprazole (PROTONIX) 40 mg tablet, Take 1 tablet (40 mg total) by mouth daily, Disp: 30 tablet, Rfl: 1  •  pregabalin (LYRICA) 75 mg capsule, Take 1 capsule (75 mg total) by mouth 2 (two) times a day, Disp: 60 capsule, Rfl: 5  •  rosuvastatin (CRESTOR) 40 MG tablet, TAKE ONE (1) TABLET BY MOUTH DAILY, Disp: 30 tablet, Rfl: 5  •  sucralfate (CARAFATE) 1 g tablet, Take 1 tablet (1 g total) by mouth 3 (three) times a day as needed (abdominal pain or bloating), Disp: 90 tablet, Rfl: 0  •  tacrolimus (PROGRAF) 1 mg capsule, TAKE 1 CAPSULE (1 MG TOTAL) BY MOUTH EVERY 12 (TWELVE) HOURS, Disp: 180 capsule, Rfl: 3  •  tamsulosin (FLOMAX) 0.4 mg, TAKE 1 CAPSULE (0.4 MG TOTAL) BY MOUTH DAILY WITH DINNER, Disp: 90 capsule, Rfl: 3  •  temazepam (RESTORIL) 7.5 mg capsule, TAKE 2 CAPSULES (15 MG TOTAL) BY MOUTH DAILY AT BEDTIME AS NEEDED FOR SLEEP, Disp: 30 capsule, Rfl: 5  •  zolpidem (AMBIEN) 5 mg tablet, TAKE 1 TABLET (5 MG TOTAL) BY MOUTH DAILY AT BEDTIME AS NEEDED FOR SLEEP, Disp: 30 tablet, Rfl: 3    Laboratory Results:  Lab Results   Component Value Date    WBC 5.34 07/31/2023    HGB 8.2 (L) 07/31/2023    HCT 26.7 (L) 07/31/2023    MCV 77 (L) 07/31/2023    PLT 91 (L) 07/31/2023     Lab Results   Component Value Date    SODIUM 138 08/01/2023    K 4.8 08/01/2023     (H) 08/01/2023    CO2 19 (L) 08/01/2023    BUN 45 (H) 08/01/2023    CREATININE 3.66 (H) 08/01/2023    GLUC 173 (H) 08/01/2023    CALCIUM 9.3 08/01/2023     Lab Results   Component Value Date    CALCIUM 9.3 08/01/2023    PHOS 2.5 10/13/2022     No results found for: "LABPROT"

## 2023-09-27 ENCOUNTER — PATIENT OUTREACH (OUTPATIENT)
Dept: CASE MANAGEMENT | Facility: HOSPITAL | Age: 73
End: 2023-09-27

## 2023-09-27 DIAGNOSIS — E11.40 CONTROLLED TYPE 2 DIABETES MELLITUS WITH DIABETIC NEUROPATHY, WITH LONG-TERM CURRENT USE OF INSULIN (HCC): ICD-10-CM

## 2023-09-27 DIAGNOSIS — Z79.4 CONTROLLED TYPE 2 DIABETES MELLITUS WITH DIABETIC NEUROPATHY, WITH LONG-TERM CURRENT USE OF INSULIN (HCC): ICD-10-CM

## 2023-09-27 RX ORDER — BLOOD-GLUCOSE CONTROL, NORMAL
EACH MISCELLANEOUS
Qty: 1 EACH | Refills: 0 | Status: SHIPPED | OUTPATIENT
Start: 2023-09-27

## 2023-09-27 NOTE — PROGRESS NOTES
OSW called Rosibel today. She answered call and was able to talk. She apologized stating she forgot to call back last week. OSW explained that is no problem and that pt is on this writer's schedule for outreach regularly. Marisamoreno Adame stated Emerita met with nephrology yesterday and everything is stable. She continues to try to encourage increasing his fluid intake, and also to talk about securing her and their daughter's stability after Cathern Cart. He refuses to complete any POA paperwork or to complete a will. She is worried because he has 3 other children who may want to acquire his estate after his death. When she tells him she is worried about his other children coming into the picture, he dismisses her and states "that won't happen." Emotional support provided. Marisa Adame did submit the MA application that was completed with OSW to the [facility]. She is awaiting a response. Provided active and supportive listening today. Will continue to provide outreach calls.

## 2023-10-03 DIAGNOSIS — R91.1 PULMONARY NODULE: ICD-10-CM

## 2023-10-03 RX ORDER — ALBUTEROL SULFATE 90 UG/1
2 AEROSOL, METERED RESPIRATORY (INHALATION) EVERY 6 HOURS PRN
Qty: 18 G | Refills: 5 | Status: SHIPPED | OUTPATIENT
Start: 2023-10-03

## 2023-10-04 ENCOUNTER — HOSPITAL ENCOUNTER (OUTPATIENT)
Dept: INFUSION CENTER | Facility: HOSPITAL | Age: 73
Discharge: HOME | End: 2023-10-04
Payer: COMMERCIAL

## 2023-10-04 DIAGNOSIS — Z95.828 PORT-A-CATH IN PLACE: Primary | ICD-10-CM

## 2023-10-04 PROCEDURE — 96523 IRRIG DRUG DELIVERY DEVICE: CPT

## 2023-10-04 NOTE — PROGRESS NOTES
Per [facility] PA-C, no need to remove port. Pt can continue with flushes if he would like. However, he does not have to. Patient's next port flush is in November where that discussion can take place.

## 2023-10-06 ENCOUNTER — OFFICE VISIT (OUTPATIENT)
Dept: PALLIATIVE MEDICINE | Facility: CLINIC | Age: 73
End: 2023-10-06

## 2023-10-06 VITALS
BODY MASS INDEX: 21.34 KG/M2 | HEIGHT: 64 IN | DIASTOLIC BLOOD PRESSURE: 64 MMHG | HEART RATE: 82 BPM | OXYGEN SATURATION: 99 % | TEMPERATURE: 97 F | WEIGHT: 125 LBS | SYSTOLIC BLOOD PRESSURE: 116 MMHG

## 2023-10-06 DIAGNOSIS — C67.9 BLADDER CANCER METASTASIZED TO LUNG (HCC): Primary | ICD-10-CM

## 2023-10-06 DIAGNOSIS — Z51.5 ENCOUNTER FOR PALLIATIVE CARE: ICD-10-CM

## 2023-10-06 DIAGNOSIS — C78.00 BLADDER CANCER METASTASIZED TO LUNG (HCC): Primary | ICD-10-CM

## 2023-10-06 NOTE — PROGRESS NOTES
Outpatient Follow-Up - Palliative and Supportive Care   Sid Hernandez 74 y.o. male [id number]    Assessment & Plan    1. Bladder cancer 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 ([address])  2. Encounter for palliative care  - Rogers's symptoms are stable and he is pleased with his current quality if life. Will not make changes to his regimen at this time, though would consider increasing his nortriptyline at a later date if he is interested. I recommended he discuss his restless legs with his PCP as he may benefit from iron supplementation. We will plan for follow up in 3 months or sooner as needed. Sid Hernandez was seen today for symptoms and planning cares related to above illnesses. I have reviewed the patient's controlled substance dispensing history in the Prescription Drug Monitoring Program in compliance with the [facility] regulations before prescribing any controlled substances. They are invited to continue to follow with us. If there are questions or concerns, please contact us through our clinic/answering service 24 hours a day, seven days a week. Feroz Galeana MD  [facility] Palliative and Supportive [facility]  [phone]      Visit Information    Accompanied By: Significant other    Source of History: Self, Significant other    History Limitations: Brazilian is first language, near fluent in [facility]. Prefers to utilize significant other as  when needed. Contacts: On file    Chief Complaint  Chief Complaint   Patient presents with   • Follow-up     History of Present Illness      Sid Hernandez is a 74 y.o. male who presents in follow up of symptoms related to bladder cancer metastatic to lung. Pertinent issues include: symptom management, breathlessness, fatigue, disease process education and discussion of prognosis    Last visit, Maria Guadalupe Gotti was struggling with worsened MUÑIZ and lightheadedness that have since resolved. He is eating and drinking more and feels this may have contributed to his improvement in symptoms.  He still notes very minimal dysuria but his urine cultures are not consistent with UTI. He denies pain, fatigue, lightheadedness, diarrhea. Neuropathic pain is stable. Wife notes restless legs at night, known history of anemia and iron deficiency.     Past medical, surgical, social, and family histories are reviewed and pertinent updates are made. ROS as per HPI    Vital Signs    There were no vitals taken for this visit. Physical Exam and Objective Data  Physical Exam  Constitutional:       General: He is not in acute distress. HENT:      Head: Normocephalic and atraumatic. Mouth/Throat:      Mouth: Mucous membranes are moist.   Eyes:      Extraocular Movements: Extraocular movements intact. Cardiovascular:      Rate and Rhythm: Normal rate. Pulmonary:      Effort: Pulmonary effort is normal. No respiratory distress. Abdominal:      Palpations: Abdomen is soft. Tenderness: There is no abdominal tenderness. Musculoskeletal:      Right lower leg: No edema. Left lower leg: No edema. Skin:     General: Skin is warm and dry. Coloration: Skin is not pale. Neurological:      Mental Status: He is alert. Psychiatric:         Mood and Affect: Mood normal.         Behavior: Behavior normal.       Radiology and Laboratory:  I personally reviewed and interpreted the following results: Urine cx 9/21 with <10,000 cfu mixed contaminants    15 minutes was spent face to face with Diana Robin and his partner with greater than 50% of the time spent in counseling or coordination of care including discussions of prognosis of diagnosis, risks and benefits of treatment, instructions for disease self management, follow up requirements and patient and family counseling/involvement in care. All of the patient's or agent's questions were answered during this discussion.

## 2023-10-12 ENCOUNTER — PATIENT OUTREACH (OUTPATIENT)
Dept: CASE MANAGEMENT | Facility: HOSPITAL | Age: 73
End: 2023-10-12

## 2023-10-12 NOTE — PROGRESS NOTES
OSW called Venita Nissen today for continued emotional support. Venita Nissen stated Alisas health appears to be very stable, based on his recent doctor's visits. She continues to have conversations with him about completing a will and POA, however he is not receptive. She feels Nik Morales could be doing more for himself, however he expects her or their daughter to get him his food, clean up after him, etc. She reports he sits and watches TV all day. She asks him about counseling, however he also declines this. Provided active listening, and praised Venita Nissen for continuing to be so supportive to Nik Morales. OSW will continue supportive outreach calls.

## 2023-10-16 DIAGNOSIS — G47.09 OTHER INSOMNIA: ICD-10-CM

## 2023-10-16 RX ORDER — ZOLPIDEM TARTRATE 5 MG/1
5 TABLET ORAL
Qty: 30 TABLET | Refills: 0 | Status: SHIPPED | OUTPATIENT
Start: 2023-10-16

## 2023-10-16 NOTE — TELEPHONE ENCOUNTER
Medication:  PDMP   09/12/2023 08/22/2023 Zolpidem Tartrate (Tablet) 30.0 30 5 MG NA NAVDEEP HAYES     Active agreement on file -No

## 2023-10-17 DIAGNOSIS — Z86.73 HISTORY OF CVA (CEREBROVASCULAR ACCIDENT): Chronic | ICD-10-CM

## 2023-10-17 RX ORDER — CLOPIDOGREL BISULFATE 75 MG/1
75 TABLET ORAL DAILY
Qty: 30 TABLET | Refills: 3 | Status: SHIPPED | OUTPATIENT
Start: 2023-10-17

## 2023-10-18 DIAGNOSIS — E11.40 CONTROLLED TYPE 2 DIABETES MELLITUS WITH DIABETIC NEUROPATHY, WITH LONG-TERM CURRENT USE OF INSULIN (HCC): ICD-10-CM

## 2023-10-18 DIAGNOSIS — Z79.4 CONTROLLED TYPE 2 DIABETES MELLITUS WITH DIABETIC NEUROPATHY, WITH LONG-TERM CURRENT USE OF INSULIN (HCC): ICD-10-CM

## 2023-10-23 ENCOUNTER — APPOINTMENT (EMERGENCY)
Dept: RADIOLOGY | Facility: HOSPITAL | Age: 73
DRG: 871 | End: 2023-10-23
Payer: COMMERCIAL

## 2023-10-23 ENCOUNTER — HOSPITAL ENCOUNTER (INPATIENT)
Facility: HOSPITAL | Age: 73
LOS: 11 days | DRG: 871 | End: 2023-11-03
Attending: STUDENT IN AN ORGANIZED HEALTH CARE EDUCATION/TRAINING PROGRAM | Admitting: STUDENT IN AN ORGANIZED HEALTH CARE EDUCATION/TRAINING PROGRAM
Payer: COMMERCIAL

## 2023-10-23 DIAGNOSIS — N17.0 ACUTE RENAL FAILURE WITH ACUTE TUBULAR NECROSIS SUPERIMPOSED ON STAGE 5 CHRONIC KIDNEY DISEASE, NOT ON CHRONIC DIALYSIS (HCC): Primary | ICD-10-CM

## 2023-10-23 DIAGNOSIS — N18.9 ACUTE-ON-CHRONIC KIDNEY INJURY: ICD-10-CM

## 2023-10-23 DIAGNOSIS — A41.9 SEPTIC SHOCK (HCC): ICD-10-CM

## 2023-10-23 DIAGNOSIS — C78.00 BLADDER CANCER METASTASIZED TO LUNG (HCC): ICD-10-CM

## 2023-10-23 DIAGNOSIS — N30.00 ACUTE CYSTITIS WITHOUT HEMATURIA: ICD-10-CM

## 2023-10-23 DIAGNOSIS — C67.9 BLADDER CANCER METASTASIZED TO LUNG (HCC): ICD-10-CM

## 2023-10-23 DIAGNOSIS — R78.81 MSSA BACTEREMIA: ICD-10-CM
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
## 2023-10-23 DIAGNOSIS — N18.9 CKD (CHRONIC KIDNEY DISEASE): ICD-10-CM

## 2023-10-23 DIAGNOSIS — R79.89 ELEVATED SERUM CREATININE: ICD-10-CM

## 2023-10-23 DIAGNOSIS — N17.9 ACUTE-ON-CHRONIC KIDNEY INJURY: ICD-10-CM

## 2023-10-23 DIAGNOSIS — R78.81 STAPHYLOCOCCUS AUREUS BACTEREMIA: ICD-10-CM

## 2023-10-23 DIAGNOSIS — B95.61 STAPHYLOCOCCUS AUREUS BACTEREMIA: ICD-10-CM

## 2023-10-23 DIAGNOSIS — R65.21 SEPTIC SHOCK (HCC): ICD-10-CM

## 2023-10-23 DIAGNOSIS — N18.5 ACUTE RENAL FAILURE WITH ACUTE TUBULAR NECROSIS SUPERIMPOSED ON STAGE 5 CHRONIC KIDNEY DISEASE, NOT ON CHRONIC DIALYSIS (HCC): Primary | ICD-10-CM

## 2023-10-23 DIAGNOSIS — B95.61 MSSA BACTEREMIA: ICD-10-CM

## 2023-10-23 PROBLEM — D64.9 ANEMIA: Status: ACTIVE | Noted: 2023-10-23

## 2023-10-23 PROBLEM — N39.0 UTI (URINARY TRACT INFECTION): Status: ACTIVE | Noted: 2023-10-23

## 2023-10-23 PROBLEM — Z86.73 HISTORY OF CVA (CEREBROVASCULAR ACCIDENT): Status: ACTIVE | Noted: 2023-10-23

## 2023-10-23 PROBLEM — G47.00 INSOMNIA: Status: ACTIVE | Noted: 2023-10-23

## 2023-10-23 PROBLEM — E11.9 T2DM (TYPE 2 DIABETES MELLITUS) (HCC): Status: ACTIVE | Noted: 2023-10-23

## 2023-10-23 PROBLEM — Z94.4 HISTORY OF LIVER TRANSPLANT (HCC): Status: ACTIVE | Noted: 2023-10-23

## 2023-10-23 PROBLEM — D69.6 THROMBOCYTOPENIA (HCC): Status: ACTIVE | Noted: 2023-10-23

## 2023-10-23 PROBLEM — F03.90 DEMENTIA (HCC): Status: ACTIVE | Noted: 2023-10-23

## 2023-10-23 LAB
2HR DELTA HS TROPONIN: 3 NG/L
4HR DELTA HS TROPONIN: 7 NG/L
ACANTHOCYTES BLD QL SMEAR: PRESENT
ALBUMIN SERPL BCP-MCNC: 3.3 G/DL (ref 3.5–5)
ALP SERPL-CCNC: 59 U/L (ref 34–104)
ALT SERPL W P-5'-P-CCNC: 16 U/L (ref 7–52)
ANION GAP SERPL CALCULATED.3IONS-SCNC: 11 MMOL/L
ANISOCYTOSIS BLD QL SMEAR: PRESENT
AST SERPL W P-5'-P-CCNC: 37 U/L (ref 5–45)
ATRIAL RATE: 29 BPM
BACTERIA UR QL AUTO: ABNORMAL /HPF
BASOPHILS # BLD AUTO: 0.02 THOUSANDS/ÂΜL (ref 0–0.1)
BASOPHILS NFR BLD AUTO: 0 % (ref 0–1)
BILIRUB SERPL-MCNC: 0.44 MG/DL (ref 0.2–1)
BILIRUB UR QL STRIP: NEGATIVE
BUN SERPL-MCNC: 56 MG/DL (ref 5–25)
BURR CELLS BLD QL SMEAR: PRESENT
CALCIUM ALBUM COR SERPL-MCNC: 8.3 MG/DL (ref 8.3–10.1)
CALCIUM SERPL-MCNC: 7.7 MG/DL (ref 8.4–10.2)
CARDIAC TROPONIN I PNL SERPL HS: 12 NG/L
CARDIAC TROPONIN I PNL SERPL HS: 15 NG/L
CARDIAC TROPONIN I PNL SERPL HS: 19 NG/L
CHLORIDE SERPL-SCNC: 108 MMOL/L (ref 96–108)
CLARITY UR: CLEAR
CO2 SERPL-SCNC: 16 MMOL/L (ref 21–32)
COLOR UR: COLORLESS
CREAT SERPL-MCNC: 4.25 MG/DL (ref 0.6–1.3)
EOSINOPHIL # BLD AUTO: 0.08 THOUSAND/ÂΜL (ref 0–0.61)
EOSINOPHIL NFR BLD AUTO: 1 % (ref 0–6)
ERYTHROCYTE [DISTWIDTH] IN BLOOD BY AUTOMATED COUNT: 14.5 % (ref 11.6–15.1)
GLUCOSE SERPL-MCNC: 188 MG/DL (ref 65–140)
GLUCOSE UR STRIP-MCNC: ABNORMAL MG/DL
HCT VFR BLD AUTO: 25.7 % (ref 36.5–46.1)
HGB BLD-MCNC: 8 G/DL (ref 12–15.4)
HGB UR QL STRIP.AUTO: ABNORMAL
IMM GRANULOCYTES # BLD AUTO: 0.05 THOUSAND/UL (ref 0–0.2)
IMM GRANULOCYTES NFR BLD AUTO: 1 % (ref 0–2)
KETONES UR STRIP-MCNC: ABNORMAL MG/DL
LEUKOCYTE ESTERASE UR QL STRIP: ABNORMAL
LYMPHOCYTES # BLD AUTO: 0.7 THOUSANDS/ÂΜL (ref 0.6–4.47)
LYMPHOCYTES NFR BLD AUTO: 9 % (ref 14–44)
MCH RBC QN AUTO: 23.9 PG (ref 26.8–34.3)
MCHC RBC AUTO-ENTMCNC: 31.1 G/DL (ref 31.4–37.4)
MCV RBC AUTO: 77 FL (ref 82–98)
MONOCYTES # BLD AUTO: 0.82 THOUSAND/ÂΜL (ref 0.17–1.22)
MONOCYTES NFR BLD AUTO: 10 % (ref 4–12)
NEUTROPHILS # BLD AUTO: 6.39 THOUSANDS/ÂΜL (ref 1.85–7.62)
NEUTS SEG NFR BLD AUTO: 79 % (ref 43–75)
NITRITE UR QL STRIP: NEGATIVE
NON-SQ EPI CELLS URNS QL MICRO: ABNORMAL /HPF
NRBC BLD AUTO-RTO: 0 /100 WBCS
PH UR STRIP.AUTO: 7 [PH]
PLATELET # BLD AUTO: 68 THOUSANDS/UL (ref 149–390)
PLATELET BLD QL SMEAR: ABNORMAL
POIKILOCYTOSIS BLD QL SMEAR: PRESENT
POLYCHROMASIA BLD QL SMEAR: PRESENT
POTASSIUM SERPL-SCNC: 4.5 MMOL/L (ref 3.5–5.3)
PROT SERPL-MCNC: 7.1 G/DL (ref 6.4–8.4)
PROT UR STRIP-MCNC: ABNORMAL MG/DL
QRS AXIS: 6 DEGREES
QRSD INTERVAL: 70 MS
QT INTERVAL: 322 MS
QTC INTERVAL: 406 MS
RBC # BLD AUTO: 3.35 MILLION/UL (ref 3.88–5.12)
RBC #/AREA URNS AUTO: ABNORMAL /HPF
RBC MORPH BLD: PRESENT
SODIUM SERPL-SCNC: 135 MMOL/L (ref 135–147)
SP GR UR STRIP.AUTO: 1.02 (ref 1–1.03)
T WAVE AXIS: 29 DEGREES
UROBILINOGEN UR STRIP-ACNC: <2 MG/DL
VENTRICULAR RATE: 96 BPM
WBC # BLD AUTO: 8.06 THOUSAND/UL (ref 4.31–10.16)
WBC #/AREA URNS AUTO: ABNORMAL /HPF

## 2023-10-23 PROCEDURE — 99291 CRITICAL CARE FIRST HOUR: CPT | Performed by: STUDENT IN AN ORGANIZED HEALTH CARE EDUCATION/TRAINING PROGRAM

## 2023-10-23 PROCEDURE — 84484 ASSAY OF TROPONIN QUANT: CPT | Performed by: STUDENT IN AN ORGANIZED HEALTH CARE EDUCATION/TRAINING PROGRAM

## 2023-10-23 PROCEDURE — 93308 TTE F-UP OR LMTD: CPT | Performed by: STUDENT IN AN ORGANIZED HEALTH CARE EDUCATION/TRAINING PROGRAM

## 2023-10-23 PROCEDURE — 76705 ECHO EXAM OF ABDOMEN: CPT | Performed by: STUDENT IN AN ORGANIZED HEALTH CARE EDUCATION/TRAINING PROGRAM

## 2023-10-23 PROCEDURE — 70450 CT HEAD/BRAIN W/O DYE: CPT

## 2023-10-23 PROCEDURE — 87086 URINE CULTURE/COLONY COUNT: CPT | Performed by: STUDENT IN AN ORGANIZED HEALTH CARE EDUCATION/TRAINING PROGRAM

## 2023-10-23 PROCEDURE — 80053 COMPREHEN METABOLIC PANEL: CPT | Performed by: STUDENT IN AN ORGANIZED HEALTH CARE EDUCATION/TRAINING PROGRAM

## 2023-10-23 PROCEDURE — 87186 SC STD MICRODIL/AGAR DIL: CPT | Performed by: STUDENT IN AN ORGANIZED HEALTH CARE EDUCATION/TRAINING PROGRAM

## 2023-10-23 PROCEDURE — 99285 EMERGENCY DEPT VISIT HI MDM: CPT

## 2023-10-23 PROCEDURE — G1004 CDSM NDSC: HCPCS

## 2023-10-23 PROCEDURE — 99223 1ST HOSP IP/OBS HIGH 75: CPT | Performed by: STUDENT IN AN ORGANIZED HEALTH CARE EDUCATION/TRAINING PROGRAM

## 2023-10-23 PROCEDURE — 85025 COMPLETE CBC W/AUTO DIFF WBC: CPT | Performed by: STUDENT IN AN ORGANIZED HEALTH CARE EDUCATION/TRAINING PROGRAM

## 2023-10-23 PROCEDURE — 87154 CUL TYP ID BLD PTHGN 6+ TRGT: CPT | Performed by: STUDENT IN AN ORGANIZED HEALTH CARE EDUCATION/TRAINING PROGRAM

## 2023-10-23 PROCEDURE — 74177 CT ABD & PELVIS W/CONTRAST: CPT

## 2023-10-23 PROCEDURE — 96375 TX/PRO/DX INJ NEW DRUG ADDON: CPT

## 2023-10-23 PROCEDURE — 93005 ELECTROCARDIOGRAM TRACING: CPT

## 2023-10-23 PROCEDURE — 81001 URINALYSIS AUTO W/SCOPE: CPT | Performed by: STUDENT IN AN ORGANIZED HEALTH CARE EDUCATION/TRAINING PROGRAM

## 2023-10-23 PROCEDURE — 87040 BLOOD CULTURE FOR BACTERIA: CPT | Performed by: STUDENT IN AN ORGANIZED HEALTH CARE EDUCATION/TRAINING PROGRAM

## 2023-10-23 PROCEDURE — 72125 CT NECK SPINE W/O DYE: CPT

## 2023-10-23 PROCEDURE — 96376 TX/PRO/DX INJ SAME DRUG ADON: CPT

## 2023-10-23 PROCEDURE — 87147 CULTURE TYPE IMMUNOLOGIC: CPT | Performed by: STUDENT IN AN ORGANIZED HEALTH CARE EDUCATION/TRAINING PROGRAM

## 2023-10-23 PROCEDURE — 71045 X-RAY EXAM CHEST 1 VIEW: CPT

## 2023-10-23 PROCEDURE — 96366 THER/PROPH/DIAG IV INF ADDON: CPT

## 2023-10-23 PROCEDURE — 36415 COLL VENOUS BLD VENIPUNCTURE: CPT | Performed by: STUDENT IN AN ORGANIZED HEALTH CARE EDUCATION/TRAINING PROGRAM

## 2023-10-23 PROCEDURE — 96365 THER/PROPH/DIAG IV INF INIT: CPT

## 2023-10-23 PROCEDURE — NC001 PR NO CHARGE: Performed by: STUDENT IN AN ORGANIZED HEALTH CARE EDUCATION/TRAINING PROGRAM

## 2023-10-23 PROCEDURE — 93010 ELECTROCARDIOGRAM REPORT: CPT | Performed by: INTERNAL MEDICINE

## 2023-10-23 RX ORDER — ATORVASTATIN CALCIUM 80 MG/1
80 TABLET, FILM COATED ORAL
Status: DISCONTINUED | OUTPATIENT
Start: 2023-10-23 | End: 2023-11-03

## 2023-10-23 RX ORDER — LOPERAMIDE HYDROCHLORIDE 2 MG/1
2 CAPSULE ORAL 4 TIMES DAILY PRN
COMMUNITY
End: 2023-11-03

## 2023-10-23 RX ORDER — SODIUM CHLORIDE, SODIUM GLUCONATE, SODIUM ACETATE, POTASSIUM CHLORIDE, MAGNESIUM CHLORIDE, SODIUM PHOSPHATE, DIBASIC, AND POTASSIUM PHOSPHATE .53; .5; .37; .037; .03; .012; .00082 G/100ML; G/100ML; G/100ML; G/100ML; G/100ML; G/100ML; G/100ML
100 INJECTION, SOLUTION INTRAVENOUS CONTINUOUS
Status: DISCONTINUED | OUTPATIENT
Start: 2023-10-23 | End: 2023-10-30

## 2023-10-23 RX ORDER — TAMSULOSIN HYDROCHLORIDE 0.4 MG/1
0.4 CAPSULE ORAL
Status: DISCONTINUED | OUTPATIENT
Start: 2023-10-23 | End: 2023-10-30

## 2023-10-23 RX ORDER — MECLIZINE HYDROCHLORIDE 25 MG/1
25 TABLET ORAL EVERY 12 HOURS PRN
COMMUNITY
End: 2023-11-03

## 2023-10-23 RX ORDER — ACETAMINOPHEN 325 MG/1
650 TABLET ORAL EVERY 6 HOURS PRN
Status: DISCONTINUED | OUTPATIENT
Start: 2023-10-23 | End: 2023-10-30

## 2023-10-23 RX ORDER — DIPHENHYDRAMINE HYDROCHLORIDE 50 MG/ML
INJECTION INTRAMUSCULAR; INTRAVENOUS
Status: COMPLETED
Start: 2023-10-23 | End: 2023-10-23

## 2023-10-23 RX ORDER — SODIUM CHLORIDE, SODIUM GLUCONATE, SODIUM ACETATE, POTASSIUM CHLORIDE, MAGNESIUM CHLORIDE, SODIUM PHOSPHATE, DIBASIC, AND POTASSIUM PHOSPHATE .53; .5; .37; .037; .03; .012; .00082 G/100ML; G/100ML; G/100ML; G/100ML; G/100ML; G/100ML; G/100ML
1000 INJECTION, SOLUTION INTRAVENOUS ONCE
Status: COMPLETED | OUTPATIENT
Start: 2023-10-23 | End: 2023-10-23

## 2023-10-23 RX ORDER — PANTOPRAZOLE SODIUM 40 MG/1
40 TABLET, DELAYED RELEASE ORAL DAILY
Status: DISCONTINUED | OUTPATIENT
Start: 2023-10-24 | End: 2023-11-03 | Stop reason: HOSPADM

## 2023-10-23 RX ORDER — CLOPIDOGREL BISULFATE 75 MG/1
75 TABLET ORAL DAILY
COMMUNITY
End: 2023-11-03

## 2023-10-23 RX ORDER — DIPHENHYDRAMINE HYDROCHLORIDE 50 MG/ML
50 INJECTION INTRAMUSCULAR; INTRAVENOUS ONCE
Status: COMPLETED | OUTPATIENT
Start: 2023-10-23 | End: 2023-10-23

## 2023-10-23 RX ORDER — ALBUTEROL SULFATE 90 UG/1
2 AEROSOL, METERED RESPIRATORY (INHALATION) EVERY 6 HOURS PRN
COMMUNITY
End: 2023-11-03

## 2023-10-23 RX ORDER — HYDROMORPHONE HCL/PF 1 MG/ML
0.5 SYRINGE (ML) INJECTION ONCE
Status: COMPLETED | OUTPATIENT
Start: 2023-10-23 | End: 2023-10-23

## 2023-10-23 RX ORDER — PREGABALIN 75 MG/1
75 CAPSULE ORAL 2 TIMES DAILY
COMMUNITY
End: 2023-11-03

## 2023-10-23 RX ORDER — TEMAZEPAM 7.5 MG/1
15 CAPSULE ORAL
COMMUNITY
End: 2023-11-03

## 2023-10-23 RX ORDER — ZOLPIDEM TARTRATE 5 MG/1
5 TABLET ORAL
Status: DISCONTINUED | OUTPATIENT
Start: 2023-10-23 | End: 2023-11-01

## 2023-10-23 RX ORDER — TAMSULOSIN HYDROCHLORIDE 0.4 MG/1
0.4 CAPSULE ORAL
COMMUNITY
End: 2023-11-03

## 2023-10-23 RX ORDER — INSULIN LISPRO 100 [IU]/ML
1-5 INJECTION, SOLUTION INTRAVENOUS; SUBCUTANEOUS
Status: DISCONTINUED | OUTPATIENT
Start: 2023-10-24 | End: 2023-11-03

## 2023-10-23 RX ORDER — PREGABALIN 75 MG/1
75 CAPSULE ORAL 2 TIMES DAILY
Status: DISCONTINUED | OUTPATIENT
Start: 2023-10-23 | End: 2023-11-03 | Stop reason: HOSPADM

## 2023-10-23 RX ORDER — TACROLIMUS 1 MG/1
1 CAPSULE ORAL EVERY 12 HOURS SCHEDULED
COMMUNITY
End: 2023-11-03

## 2023-10-23 RX ORDER — ZOLPIDEM TARTRATE 5 MG/1
5 TABLET ORAL
COMMUNITY
End: 2023-11-03

## 2023-10-23 RX ORDER — NORTRIPTYLINE HYDROCHLORIDE 10 MG/1
10 CAPSULE ORAL
COMMUNITY
End: 2023-11-03

## 2023-10-23 RX ORDER — ROSUVASTATIN CALCIUM 40 MG/1
40 TABLET, COATED ORAL DAILY
COMMUNITY
End: 2023-11-03

## 2023-10-23 RX ORDER — MECLIZINE HYDROCHLORIDE 25 MG/1
25 TABLET ORAL EVERY 8 HOURS PRN
Status: DISCONTINUED | OUTPATIENT
Start: 2023-10-23 | End: 2023-11-03 | Stop reason: HOSPADM

## 2023-10-23 RX ORDER — HEPARIN SODIUM 5000 [USP'U]/ML
5000 INJECTION, SOLUTION INTRAVENOUS; SUBCUTANEOUS EVERY 8 HOURS SCHEDULED
Status: DISCONTINUED | OUTPATIENT
Start: 2023-10-23 | End: 2023-11-03

## 2023-10-23 RX ORDER — NORTRIPTYLINE HYDROCHLORIDE 10 MG/1
10 CAPSULE ORAL
Status: DISCONTINUED | OUTPATIENT
Start: 2023-10-23 | End: 2023-11-03 | Stop reason: HOSPADM

## 2023-10-23 RX ORDER — PANTOPRAZOLE SODIUM 40 MG/1
40 TABLET, DELAYED RELEASE ORAL DAILY
COMMUNITY
End: 2023-11-03

## 2023-10-23 RX ORDER — TEMAZEPAM 15 MG/1
15 CAPSULE ORAL
Status: DISCONTINUED | OUTPATIENT
Start: 2023-10-23 | End: 2023-11-03 | Stop reason: HOSPADM

## 2023-10-23 RX ORDER — TACROLIMUS 1 MG/1
1 CAPSULE ORAL EVERY 12 HOURS SCHEDULED
Status: DISCONTINUED | OUTPATIENT
Start: 2023-10-23 | End: 2023-11-01

## 2023-10-23 RX ORDER — SUCRALFATE 1 G/1
1 TABLET ORAL 3 TIMES DAILY PRN
COMMUNITY
End: 2023-11-03

## 2023-10-23 RX ORDER — CLOPIDOGREL BISULFATE 75 MG/1
75 TABLET ORAL DAILY
Status: DISCONTINUED | OUTPATIENT
Start: 2023-10-24 | End: 2023-11-03 | Stop reason: HOSPADM

## 2023-10-23 RX ORDER — SUCRALFATE 1 G/1
1 TABLET ORAL
Status: DISCONTINUED | OUTPATIENT
Start: 2023-10-23 | End: 2023-11-03 | Stop reason: HOSPADM

## 2023-10-23 RX ORDER — OXYBUTYNIN CHLORIDE 5 MG/1
5 TABLET ORAL 2 TIMES DAILY
COMMUNITY
End: 2023-11-03

## 2023-10-23 RX ORDER — OXYBUTYNIN CHLORIDE 5 MG/1
5 TABLET ORAL 2 TIMES DAILY
Status: DISCONTINUED | OUTPATIENT
Start: 2023-10-23 | End: 2023-11-03 | Stop reason: HOSPADM

## 2023-10-23 RX ADMIN — SUCRALFATE 1 G: 1 TABLET ORAL at 17:39

## 2023-10-23 RX ADMIN — SODIUM CHLORIDE, SODIUM GLUCONATE, SODIUM ACETATE, POTASSIUM CHLORIDE, MAGNESIUM CHLORIDE, SODIUM PHOSPHATE, DIBASIC, AND POTASSIUM PHOSPHATE 100 ML/HR: .53; .5; .37; .037; .03; .012; .00082 INJECTION, SOLUTION INTRAVENOUS at 17:22

## 2023-10-23 RX ADMIN — SODIUM CHLORIDE, SODIUM GLUCONATE, SODIUM ACETATE, POTASSIUM CHLORIDE, MAGNESIUM CHLORIDE, SODIUM PHOSPHATE, DIBASIC, AND POTASSIUM PHOSPHATE 1000 ML: .53; .5; .37; .037; .03; .012; .00082 INJECTION, SOLUTION INTRAVENOUS at 13:11

## 2023-10-23 RX ADMIN — HYDROMORPHONE HYDROCHLORIDE 0.5 MG: 1 INJECTION, SOLUTION INTRAMUSCULAR; INTRAVENOUS; SUBCUTANEOUS at 13:06

## 2023-10-23 RX ADMIN — OXYBUTYNIN CHLORIDE 5 MG: 5 TABLET ORAL at 23:58

## 2023-10-23 RX ADMIN — SUCRALFATE 1 G: 1 TABLET ORAL at 22:37

## 2023-10-23 RX ADMIN — NORTRIPTYLINE HYDROCHLORIDE 10 MG: 10 CAPSULE ORAL at 23:59

## 2023-10-23 RX ADMIN — TAMSULOSIN HYDROCHLORIDE 0.4 MG: 0.4 CAPSULE ORAL at 17:39

## 2023-10-23 RX ADMIN — TEMAZEPAM 15 MG: 15 CAPSULE ORAL at 22:37

## 2023-10-23 RX ADMIN — HEPARIN SODIUM 5000 UNITS: 5000 INJECTION INTRAVENOUS; SUBCUTANEOUS at 23:59

## 2023-10-23 RX ADMIN — ZOLPIDEM TARTRATE 5 MG: 5 TABLET ORAL at 22:37

## 2023-10-23 RX ADMIN — HYDROMORPHONE HYDROCHLORIDE 0.5 MG: 1 INJECTION, SOLUTION INTRAMUSCULAR; INTRAVENOUS; SUBCUTANEOUS at 15:34

## 2023-10-23 RX ADMIN — DIPHENHYDRAMINE HYDROCHLORIDE 50 MG: 50 INJECTION, SOLUTION INTRAMUSCULAR; INTRAVENOUS at 12:39

## 2023-10-23 RX ADMIN — CEFTRIAXONE SODIUM 1000 MG: 10 INJECTION, POWDER, FOR SOLUTION INTRAVENOUS at 22:41

## 2023-10-23 RX ADMIN — TACROLIMUS 1 MG: 1 CAPSULE ORAL at 23:59

## 2023-10-23 RX ADMIN — HEPARIN SODIUM 5000 UNITS: 5000 INJECTION INTRAVENOUS; SUBCUTANEOUS at 17:27

## 2023-10-23 RX ADMIN — ACETAMINOPHEN 650 MG: 325 TABLET, FILM COATED ORAL at 17:27

## 2023-10-23 RX ADMIN — IOHEXOL 100 ML: 350 INJECTION, SOLUTION INTRAVENOUS at 13:02

## 2023-10-23 RX ADMIN — DIPHENHYDRAMINE HYDROCHLORIDE 50 MG: 50 INJECTION INTRAMUSCULAR; INTRAVENOUS at 12:39

## 2023-10-23 RX ADMIN — PREGABALIN 75 MG: 50 CAPSULE ORAL at 17:27

## 2023-10-23 RX ADMIN — ATORVASTATIN CALCIUM 80 MG: 80 TABLET, FILM COATED ORAL at 17:39

## 2023-10-23 NOTE — ED NOTES
Ecchymosis noted at umbilical, notified trauma      Richardmustapha Goldstein, RN  10/23/23 3234
NPO status, pending repeat swallow evaluation

## 2023-10-23 NOTE — H&P
[address] [facility]  H&P  Name: Marianela Raymundo 74 y.o. adult I MRN: [id number]  Unit/Bed#: ED 20 I Date of Admission: 10/23/2023   Date of Service: 10/23/2023 I Hospital Day: 0      Assessment/Plan   * Fall  Assessment & Plan  - Status post fall without head strike with the below noted injuries. - Fall precautions. - Geriatric Medicine consultation for evaluation, medication review and recommendations.  - PT and OT evaluation and treatment as indicated. - Case Management consultation for disposition planning. Trauma Alert: Level B   Model of Arrival: Ambulance    Trauma Team[facility] Attending [facility]  Consultants:     None     History of Present Illness     Chief Complaint: Unwitnessed fall  Mechanism:Fall     HPI:    Marianela Raymundo is a 74 y.o. adult with PMH dementia and cirrhosis who presents after multiple falls with unknown head strike. Patient with history of dementia history is obtained from EMS. Discussed with patients wife and daughter - state that he has been dizzy for several weeks, was on meclizine for that. He has also been complaining of frequent urination and burning for the past month, they state he has had frequent UTIs but has not been treated for this one. Review of Systems   Constitutional:  Negative for fever. Respiratory:  Negative for shortness of breath. Cardiovascular:  Negative for chest pain. Gastrointestinal:  Positive for abdominal pain. Genitourinary:  Positive for dysuria. Neurological:  Positive for dizziness, light-headedness and headaches. 12-point, complete review of systems was reviewed and negative except as stated above. Historical Information     Past Medical History:   Diagnosis Date    Cirrhosis ([address] [facility])     Dementia ([address] [facility])      History reviewed. No pertinent surgical history. There is no immunization history on file for this patient.   Last Tetanus: unknown  Family History: Non-contributory     Meds/Allergies   all current active meds have been reviewed  Allergies have not been reviewed; No Known Allergies    Objective   Initial Vitals:   Temperature: 99 °F (37.2 °C) (10/23/23 1108)  Pulse: 98 (10/23/23 1108)  Respirations: 18 (10/23/23 1108)  Blood Pressure: 134/63 (10/23/23 1108)    Primary Survey:   Airway:        Status: patent;        Pre-hospital Interventions: none        Hospital Interventions: none  Breathing:        Pre-hospital Interventions: none       Effort: normal       Right breath sounds: normal       Left breath sounds: normal  Circulation:        Rhythm: regular       Rate: regular   Right Pulses Left Pulses    R radial: 2+  R femoral: 2+  R pedal: 2+     L radial: 2+  L femoral: 2+  L pedal: 2+       Disability:        GCS: Eye: 4; Verbal: 5 Motor: 6 Total: 15       Right Pupil: round;  reactive         Left Pupil:  round;  reactive      R Motor Strength L Motor Strength    R : 5/5  R dorsiflex: 5/5  R plantarflex: 5/5 L : 5/5  L dorsiflex: 5/5  L plantarflex: 5/5        Sensory:  No sensory deficit  Exposure:       Completed: Yes      Secondary Survey:  Physical Exam  Vitals and nursing note reviewed. Constitutional:       General: Jane Bear is not in acute distress. Appearance: Normal appearance. Jane Bear is not ill-appearing. HENT:      Head: Normocephalic and atraumatic. Right Ear: Tympanic membrane normal.      Left Ear: Tympanic membrane normal.      Nose: Nose normal.      Mouth/Throat:      Mouth: Mucous membranes are moist.   Eyes:      Extraocular Movements: Extraocular movements intact. Conjunctiva/sclera: Conjunctivae normal.      Pupils: Pupils are equal, round, and reactive to light. Cardiovascular:      Rate and Rhythm: Normal rate and regular rhythm. Pulses: Normal pulses. Pulmonary:      Effort: Pulmonary effort is normal. No respiratory distress. Breath sounds: Normal breath sounds.    Abdominal: General: Abdomen is flat. Palpations: Abdomen is soft. Tenderness: There is no abdominal tenderness. Musculoskeletal:         General: No tenderness or deformity. Normal range of motion. Cervical back: No tenderness. Comments: No midline C/T/L/S spine tenderness   Skin:     General: Skin is warm and dry. Capillary Refill: Capillary refill takes less than 2 seconds. Neurological:      General: No focal deficit present. Mental Status: Ana Barnes is alert and oriented to person, place, and time. Mental status is at baseline. Psychiatric:         Mood and Affect: Mood normal.         Invasive Devices       Central Venous Catheter Line  Duration             Port A Cath Right -- days                  Lab Results: I have personally reviewed all pertinent laboratory/test results from 10/23/23, including the preceding 24 hours. Recent Labs     10/23/23  1133   WBC 8.06   HGB 8.0*   HCT 25.7*   PLT 68*   SODIUM 135   K 4.5      CO2 16*   BUN 56*   CREATININE 4.25*   GLUC 188*   AST 37   ALT 16   ALB 3.3*   TBILI 0.44   ALKPHOS 59   HSTNI0 12       Imaging Results: I have personally reviewed pertinent images saved in PACS. CT scan findings (and other pertinent positive findings on images) were discussed with radiology. My interpretation of the images/reports are as follows:  Chest Xray(s): pending   FAST exam(s): negative for acute findings   CT Scan(s): CT head - no acute abnormality . CT cervical spine - no acute abnormality. CT abdomen pelvis -   1. Redemonstration of right renal atrophy and moderate right hydronephrosis with a double-J right ureteral stent. There is mild diffuse right urothelial thickening and enhancement. There is also mild diffuse thickening of the urinary bladder and adjacent   fat stranding. This may be explained by an infectious etiology including right ureteritis and cystitis.   2. Skin thickening and/or subcutaneous edema in the periumbilical area may be due to history of trauma. Additional Xray(s): N/A     Other Studies: N/A    Code Status: No Order  Advance Directive and Living Will:      Power of :    POLST:    I have spent 30 minutes with Patient and family today in which greater than 50% of this time was spent in counseling/coordination of care regarding Diagnostic results, Prognosis, Risks and benefits of tx options, Patient and family education, Impressions, Counseling / Coordination of care, Documenting in the medical record, Reviewing / ordering tests, medicine, procedures  , and Obtaining or reviewing history  .

## 2023-10-23 NOTE — QUICK NOTE
Cervical Collar Clearance: The patient had a CT scan of the cervical spine demonstrating no acute injury. On exam, the patient had no midline point tenderness or paresthesias/numbness/weakness in the extremities. The patient had full range of motion (was then able to flex, extend, and rotate head laterally) without pain. There were no distracting injuries and the patient was not intoxicated. The patient's cervical spine was cleared radiologically and clinically. Cervical collar removed at this time.      Johanne Scott DO  10/23/2023 1:34 PM

## 2023-10-23 NOTE — PROCEDURES
POC FAST US    Date/Time: 10/23/2023 11:27 AM    Performed by: Yumi Chamberlain DO  Authorized by: Yumi Chamberlain DO    Patient location:  ED  Procedure details:     Exam Type:  Diagnostic    Indications: abdominal pain      Assess for:  Hemothorax, intra-abdominal fluid, pericardial effusion and pneumothorax    Technique: FAST      Views obtained:  Heart - Pericardial sac, LUQ - Splenorenal space, Left thorax, Right thorax, Suprapubic - Pouch of Michael and RUQ - [place]'s Pouch    Image quality: diagnostic      Image availability:  Images available in PACS  FAST Findings:     RUQ (Hepatorenal) free fluid: absent      LUQ (Splenorenal) free fluid: absent      Suprapubic free fluid: absent      Cardiac wall motion: identified      Pericardial effusion: absent    Interpretation:     Impressions: negative    POC FAST US    Date/Time: 10/23/2023 11:27 AM    Performed by: Yumi Chamberlain DO  Authorized by: Yumi Chamberlain DO    Patient location:  Trauma

## 2023-10-23 NOTE — H&P
[address] [facility]  H&P  Name: Corona Olivera 74 y.o. adult I MRN: [id number]  Unit/Bed#: TR 02 I Date of Admission: 10/23/2023   Date of Service: 10/23/2023 I Hospital Day: 0      Assessment/Plan   Fall  Assessment & Plan  - Status post fall without head strike with the below noted injuries. - Fall precautions. - Geriatric Medicine consultation for evaluation, medication review and recommendations.  - PT and OT evaluation and treatment as indicated. - Case Management consultation for disposition planning. Trauma Alert: Level B   Model of Arrival: Ambulance    Trauma Team: Attending Nelva Fabry and Residents John Albarran  Consultants: {JOCELIN IP Trauma Consultants:[id number]}     History of Present Illness     Chief Complaint: Unwitnessed fall  Mechanism:Fall     HPI:    Corona Olivera is a 74 y.o. adult with PMH dementia and cirrhosis who presents after multiple falls with unknown head strike. Patient with history of dementia history is obtained from EMS. Review of Systems  *** (fill out SmartBlock)  12-point, complete review of systems was reviewed and negative except as stated above. Historical Information     Past Medical History:   Diagnosis Date    Cirrhosis ([address])     Dementia ([address])      History reviewed. No pertinent surgical history. There is no immunization history on file for this patient. Last Tetanus: ***  Family History: Non-contributory     Meds/Allergies   all current active meds have been reviewed  Allergies have not been reviewed;   Not on File    Objective   Initial Vitals:        Primary Survey:   Airway:        Status: patent;        Pre-hospital Interventions: none        Hospital Interventions: none  Breathing:        Pre-hospital Interventions: none       Effort: normal       Right breath sounds: normal       Left breath sounds: normal  Circulation:        Rhythm: regular       Rate: regular   Right Pulses Left Pulses    R radial: 2+  R femoral: 2+  R pedal: 2+     L radial: 2+  L femoral: 2+  L pedal: 2+       Disability:        GCS: Eye: 4; Verbal: 5 Motor: 6 Total: 15       Right Pupil: round;  reactive         Left Pupil:  round;  reactive      R Motor Strength L Motor Strength    R : 5/5  R dorsiflex: 5/5  R plantarflex: 5/5 L : 5/5  L dorsiflex: 5/5  L plantarflex: 5/5        Sensory:  No sensory deficit  Exposure:       Completed: Yes      Secondary Survey:  Physical Exam *** (fill out SmartBlock)    Invasive Devices       None                 Lab Results: I have personally reviewed all pertinent laboratory/test results from 10/23/23, including the preceding 24 hours. No results for input(s): "WBC", "HGB", "HCT", "PLT", "BANDSPCT", "SODIUM", "K", "CL", "CO2", "BUN", "CREATININE", "GLUC", "CAIONIZED", "MG", "PHOS", "AST", "ALT", "ALB", "TBILI", "DBILI", "ALKPHOS", "PTT", "INR", "HSTNI0", "HSTNI2", "BNP", "LACTICACID" in the last 72 hours. Imaging Results: I have personally reviewed pertinent images saved in PACS. CT scan findings (and other pertinent positive findings on images) were discussed with radiology. My interpretation of the images/reports are as follows:  Chest Xray(s): {Results:29091}   FAST exam(s): {Results:51109}   CT Scan(s): {Results:72203}   Additional Xray(s): {Results:63037}     Other Studies: ***    Code Status: No Order  Advance Directive and Living Will:      Power of :    POLST:    I have spent *** minutes with {Patient /Family:14961} today in which greater than 50% of this time was spent in counseling/coordination of care regarding {AMB Counseling Topics:[id number]}.

## 2023-10-24 LAB
ANION GAP SERPL CALCULATED.3IONS-SCNC: 10 MMOL/L
BASOPHILS # BLD AUTO: 0.02 THOUSANDS/ÂΜL (ref 0–0.1)
BASOPHILS NFR BLD AUTO: 0 % (ref 0–1)
BUN SERPL-MCNC: 52 MG/DL (ref 5–25)
CALCIUM SERPL-MCNC: 7.2 MG/DL (ref 8.4–10.2)
CHLORIDE SERPL-SCNC: 104 MMOL/L (ref 96–108)
CO2 SERPL-SCNC: 20 MMOL/L (ref 21–32)
CREAT SERPL-MCNC: 4.2 MG/DL (ref 0.6–1.3)
EOSINOPHIL # BLD AUTO: 0.08 THOUSAND/ÂΜL (ref 0–0.61)
EOSINOPHIL NFR BLD AUTO: 1 % (ref 0–6)
ERYTHROCYTE [DISTWIDTH] IN BLOOD BY AUTOMATED COUNT: 14.6 % (ref 11.6–15.1)
GLUCOSE SERPL-MCNC: 157 MG/DL (ref 65–140)
GLUCOSE SERPL-MCNC: 198 MG/DL (ref 65–140)
GLUCOSE SERPL-MCNC: 212 MG/DL (ref 65–140)
GLUCOSE SERPL-MCNC: 272 MG/DL (ref 65–140)
HCT VFR BLD AUTO: 23.9 % (ref 36.5–46.1)
HGB BLD-MCNC: 7.5 G/DL (ref 12–15.4)
IMM GRANULOCYTES # BLD AUTO: 0.03 THOUSAND/UL (ref 0–0.2)
IMM GRANULOCYTES NFR BLD AUTO: 1 % (ref 0–2)
LYMPHOCYTES # BLD AUTO: 0.54 THOUSANDS/ÂΜL (ref 0.6–4.47)
LYMPHOCYTES NFR BLD AUTO: 9 % (ref 14–44)
MCH RBC QN AUTO: 24.1 PG (ref 26.8–34.3)
MCHC RBC AUTO-ENTMCNC: 31.4 G/DL (ref 31.4–37.4)
MCV RBC AUTO: 77 FL (ref 82–98)
MONOCYTES # BLD AUTO: 0.51 THOUSAND/ÂΜL (ref 0.17–1.22)
MONOCYTES NFR BLD AUTO: 8 % (ref 4–12)
NEUTROPHILS # BLD AUTO: 5.06 THOUSANDS/ÂΜL (ref 1.85–7.62)
NEUTS SEG NFR BLD AUTO: 81 % (ref 43–75)
NRBC BLD AUTO-RTO: 0 /100 WBCS
PLATELET # BLD AUTO: 72 THOUSANDS/UL (ref 149–390)
PLATELET BLD QL SMEAR: ABNORMAL
POTASSIUM SERPL-SCNC: 4.4 MMOL/L (ref 3.5–5.3)
RBC # BLD AUTO: 3.11 MILLION/UL (ref 3.88–5.12)
RBC MORPH BLD: NORMAL
SODIUM SERPL-SCNC: 134 MMOL/L (ref 135–147)
WBC # BLD AUTO: 6.24 THOUSAND/UL (ref 4.31–10.16)

## 2023-10-24 PROCEDURE — 99222 1ST HOSP IP/OBS MODERATE 55: CPT | Performed by: UROLOGY

## 2023-10-24 PROCEDURE — 99223 1ST HOSP IP/OBS HIGH 75: CPT | Performed by: STUDENT IN AN ORGANIZED HEALTH CARE EDUCATION/TRAINING PROGRAM

## 2023-10-24 PROCEDURE — 97167 OT EVAL HIGH COMPLEX 60 MIN: CPT

## 2023-10-24 PROCEDURE — 97163 PT EVAL HIGH COMPLEX 45 MIN: CPT

## 2023-10-24 PROCEDURE — 85025 COMPLETE CBC W/AUTO DIFF WBC: CPT | Performed by: STUDENT IN AN ORGANIZED HEALTH CARE EDUCATION/TRAINING PROGRAM

## 2023-10-24 PROCEDURE — 99232 SBSQ HOSP IP/OBS MODERATE 35: CPT | Performed by: INTERNAL MEDICINE

## 2023-10-24 PROCEDURE — 80048 BASIC METABOLIC PNL TOTAL CA: CPT | Performed by: STUDENT IN AN ORGANIZED HEALTH CARE EDUCATION/TRAINING PROGRAM

## 2023-10-24 PROCEDURE — 82948 REAGENT STRIP/BLOOD GLUCOSE: CPT

## 2023-10-24 PROCEDURE — 99231 SBSQ HOSP IP/OBS SF/LOW 25: CPT | Performed by: SURGERY

## 2023-10-24 RX ORDER — SODIUM BICARBONATE 650 MG/1
650 TABLET ORAL
Status: DISCONTINUED | OUTPATIENT
Start: 2023-10-24 | End: 2023-10-30

## 2023-10-24 RX ADMIN — HEPARIN SODIUM 5000 UNITS: 5000 INJECTION INTRAVENOUS; SUBCUTANEOUS at 06:44

## 2023-10-24 RX ADMIN — SUCRALFATE 1 G: 1 TABLET ORAL at 07:37

## 2023-10-24 RX ADMIN — PREGABALIN 75 MG: 50 CAPSULE ORAL at 17:16

## 2023-10-24 RX ADMIN — OXYBUTYNIN CHLORIDE 5 MG: 5 TABLET ORAL at 17:16

## 2023-10-24 RX ADMIN — INSULIN LISPRO 1 UNITS: 100 INJECTION, SOLUTION INTRAVENOUS; SUBCUTANEOUS at 12:20

## 2023-10-24 RX ADMIN — SUCRALFATE 1 G: 1 TABLET ORAL at 11:47

## 2023-10-24 RX ADMIN — OXYBUTYNIN CHLORIDE 5 MG: 5 TABLET ORAL at 09:01

## 2023-10-24 RX ADMIN — HEPARIN SODIUM 5000 UNITS: 5000 INJECTION INTRAVENOUS; SUBCUTANEOUS at 21:43

## 2023-10-24 RX ADMIN — TAMSULOSIN HYDROCHLORIDE 0.4 MG: 0.4 CAPSULE ORAL at 17:16

## 2023-10-24 RX ADMIN — TACROLIMUS 1 MG: 1 CAPSULE ORAL at 21:43

## 2023-10-24 RX ADMIN — PANTOPRAZOLE SODIUM 40 MG: 40 TABLET, DELAYED RELEASE ORAL at 09:01

## 2023-10-24 RX ADMIN — INSULIN LISPRO 2 UNITS: 100 INJECTION, SOLUTION INTRAVENOUS; SUBCUTANEOUS at 17:17

## 2023-10-24 RX ADMIN — PREGABALIN 75 MG: 50 CAPSULE ORAL at 09:09

## 2023-10-24 RX ADMIN — ZOLPIDEM TARTRATE 5 MG: 5 TABLET ORAL at 21:42

## 2023-10-24 RX ADMIN — SODIUM BICARBONATE 650 MG TABLET 650 MG: at 11:47

## 2023-10-24 RX ADMIN — NORTRIPTYLINE HYDROCHLORIDE 10 MG: 10 CAPSULE ORAL at 21:43

## 2023-10-24 RX ADMIN — TEMAZEPAM 15 MG: 15 CAPSULE ORAL at 21:42

## 2023-10-24 RX ADMIN — SUCRALFATE 1 G: 1 TABLET ORAL at 21:42

## 2023-10-24 RX ADMIN — CEFTRIAXONE SODIUM 1000 MG: 10 INJECTION, POWDER, FOR SOLUTION INTRAVENOUS at 21:45

## 2023-10-24 RX ADMIN — ACETAMINOPHEN 650 MG: 325 TABLET, FILM COATED ORAL at 21:42

## 2023-10-24 RX ADMIN — INSULIN LISPRO 1 UNITS: 100 INJECTION, SOLUTION INTRAVENOUS; SUBCUTANEOUS at 09:00

## 2023-10-24 RX ADMIN — ATORVASTATIN CALCIUM 80 MG: 80 TABLET, FILM COATED ORAL at 17:16

## 2023-10-24 RX ADMIN — SUCRALFATE 1 G: 1 TABLET ORAL at 17:16

## 2023-10-24 RX ADMIN — SODIUM BICARBONATE 650 MG TABLET 650 MG: at 17:16

## 2023-10-24 RX ADMIN — TACROLIMUS 1 MG: 1 CAPSULE ORAL at 09:01

## 2023-10-24 RX ADMIN — CLOPIDOGREL BISULFATE 75 MG: 75 TABLET ORAL at 09:01

## 2023-10-24 RX ADMIN — HEPARIN SODIUM 5000 UNITS: 5000 INJECTION INTRAVENOUS; SUBCUTANEOUS at 14:16

## 2023-10-24 NOTE — PLAN OF CARE
Problem: OCCUPATIONAL THERAPY ADULT  Goal: Performs self-care activities at highest level of function for planned discharge setting. See evaluation for individualized goals. Note: Limitation: Decreased ADL status, Decreased Safe judgement during ADL, Decreased endurance, Decreased self-care trans, Decreased high-level ADLs     Assessment: Pt is a 74 y.o. adult seen for OT evaluation s/p admit to [address] [place] [address] on 10/23/2023 w/ Fall. Pt has been dizzy for weeks, poor po intake. 3 falls the past few days. pt  has a past medical history of Cirrhosis ([address]) and Dementia ([address]). 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 Personal factors affecting pt at time of IE include:behavioral pattern, difficulty performing ADLS, difficulty performing IADLS , and limited insight into deficits. Prior to admission, pt was living w family and sig. Other in a 2 . Per chart, he has a FFSU. Unknown PLOF. Upon evaluation: Pt requires mod/max assist LB self care, he demonstrates poor balance sitting unsupported at EOB, retropulses. Increased time to follow simple commands 2* the following deficits impacting occupational performance: weakness, decreased strength, decreased balance, decreased tolerance, impaired initiation, impaired memory, impaired problem solving, and decreased safety awareness. Pt to benefit from continued skilled OT tx while in the hospital to address deficits as defined above and maximize level of functional independence w ADL's and functional mobility. Occupational Performance areas to address include: grooming, bathing/shower, toilet hygiene, dressing, functional mobility, and clothing management. Based on findings from OT evaluation and functional performance deficits, pt has been identified as a  high complexity evaluation. The patient's raw score on the AM-PAC Daily Activity inpatient short form is 16, standardized score is 35.96, less than 39.4.  Patients at this level are likely to benefit from discharge to post-acute rehabilitation services. From OT standpoint, recommendation at time of d/c would be STR as patient is currently functioning below baseline.      OT Discharge Recommendation: Post acute rehabilitation services

## 2023-10-24 NOTE — PLAN OF CARE
Problem: PHYSICAL THERAPY ADULT  Goal: Performs mobility at highest level of function for planned discharge setting. See evaluation for individualized goals. Description: Treatment/Interventions: Functional transfer training, LE strengthening/ROM, Elevations, Therapeutic exercise, Endurance training, Patient/family training, Cognitive reorientation, Equipment eval/education, Bed mobility, Gait training, Spoke to nursing, Spoke to case management, OT          See flowsheet documentation for full assessment, interventions and recommendations. Note: Prognosis: Fair  Problem List: Decreased strength, Decreased endurance, Impaired balance, Decreased mobility, Decreased cognition, Decreased safety awareness, Impaired vision  Assessment: Pt is a 74 y.o. adult seen for PT evaluation s/p admit to [facility] on 10/23/2023. Pt was admitted with a primary dx of: CKD, elevated serum creatinine, TAVON, s/p fall. Negative trauma work-up for acute injury. PT now consulted for assessment of mobility and d/c needs. Pt with Up and OOB as tolerated  orders. 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 Pt  has a past medical history of Cirrhosis ([address]) and Dementia ([address]). Pts current clinical presentation is Unstable/ Unpredictable (high complexity) due to Ongoing medical management for primary dx, Increased reliance on more restrictive AD compared to baseline, Decreased activity tolerance compared to baseline, Fall risk, Increased assistance needed from caregiver at current time, Ongoing telemetry monitoring, Cog status, Trending lab values, Continuous pulse oximetry monitoring . Prior to admission, pt was residing with family in [address] [place] [address] with 2 [facility] and was independent- info obtained from chart review as pt poor historian.  Upon evaluation, pt currently is requiring mod A for bed mobility; mod A for transfers; mod A for ambulation 4 ft w/  HHA . Pt presents at PT eval functioning below baseline and currently w/ overall mobility deficits 2* to: BLE weakness, impaired balance, decreased endurance, gait deviations, decreased activity tolerance compared to baseline, decreased functional mobility tolerance compared to baseline, decreased safety awareness, impaired judgement, fall risk, decreased cognition. Pt currently at a fall risk 2* to impairments listed above. Pt will continue to benefit from skilled acute PT interventions to address stated impairments; to maximize functional mobility; for ongoing pt/ family training; and DME needs. At conclusion of PT session pt returned back in chair and chair alarm engaged with phone and call bell within reach. Pt denies any further questions at this time. The patient's AM-PAC Basic Mobility Inpatient Short Form Raw Score is 13. A Raw score of less than or equal to 16 suggests the patient may benefit from discharge to post-acute rehabilitation services. Please also refer to the recommendation of the Physical Therapist for safe discharge planning. Recommend STR upon hospital D/C. Barriers to Discharge: Inaccessible home environment     PT Discharge Recommendation: Post acute rehabilitation services    See flowsheet documentation for full assessment.

## 2023-10-24 NOTE — PHYSICAL THERAPY NOTE
Physical Therapy Evaluation     Patient's Name: Theron Enrique    Admitting Diagnosis  CKD (chronic kidney disease) [N18.9]  Elevated serum creatinine [R79.89]  Acute-on-chronic kidney injury  [N17.9, N18.9]  Fall, initial encounter [W19. XXXA]    Problem List  Patient Active Problem List   Diagnosis    Fall    Bladder cancer 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 [facility])    Insomnia    History of liver transplant ([address])    T2DM (type 2 diabetes mellitus) ([address])    Elevated serum creatinine    UTI (urinary tract infection)    Dementia (HCC)    CKD (chronic kidney disease)    Thrombocytopenia (HCC)    History of CVA (cerebrovascular accident)    Anemia       Past Medical History  Past Medical History:   Diagnosis Date    Cirrhosis ([address])     Dementia ([address])        Past Surgical History  History reviewed. No pertinent surgical history. 10/24/23 0850   PT Last Visit   PT Visit Date 10/24/23   Note Type   Note type Evaluation   Pain Assessment   Pain Assessment Tool FLACC   Pain Location/Orientation Location: [facility]   Hospital Pain Intervention(s) Repositioned; Ambulation/increased activity; Emotional support   Pain Rating: FLACC (Rest) - Face 0   Pain Rating: FLACC (Rest) - Legs 0   Pain Rating: FLACC (Rest) - Activity 0   Pain Rating: FLACC (Rest) - Cry 0   Pain Rating: FLACC (Rest) - Consolability 0   Score: FLACC (Rest) 0   Pain Rating: FLACC (Activity) - Face 0   Pain Rating: FLACC (Activity) - Legs 0   Pain Rating: FLACC (Activity) - Activity 0   Pain Rating: FLACC (Activity) - Cry 0   Pain Rating: FLACC (Activity) - Consolability 0   Score: FLACC (Activity) 0   Restrictions/Precautions   Weight Bearing Precautions Per Order No   Other Precautions Cognitive; Chair Alarm; Bed Alarm;Multiple lines; Fall Risk;Pain   Home Living   Type of [address] [place] [address] Dr Two level;Performs ADLs on one level; Able to live on main level with bedroom/bathroom   Bathroom Shower/Tub Tub/shower unit   Bathroom Toilet Standard   Additional Comments pt is a poor historian, information obtained from chart review   Prior Function   Level of [place] Independent with ADLs; Independent with functional mobility; Needs assistance with IADLS   Lives With Family  (SO, daughter)   Jason Funk Help From Family   IADLs Family/Friend/Other provides transportation; Family/Friend/Other provides meals; Family/Friend/Other provides medication management   Falls in the last 6 months 1 to 4  (@ least 3 leading to current hositalization)   General   Family/Caregiver Present No   Cognition   Overall Cognitive Status Impaired   Attention Attends with cues to redirect   Orientation Level Oriented to person;Disoriented to time;Disoriented to situation;Disoriented to place   Memory Decreased recall of precautions;Decreased recall of recent events   Following Commands Follows one step commands with increased time or repetition   Comments pt pleasant and cooperative. Reports only being able to speak [place] however does communicate in basic English   RLE Assessment   RLE Assessment   (functionally 3+/5)   LLE Assessment   LLE Assessment   (functionally 3+/5)   Bed Mobility   Supine to Sit 3  Moderate assistance   Additional items Assist x 1;HOB elevated; Bedrails; Increased time required;Verbal cues   Sit to Supine Unable to assess   Additional Comments pt found supine in bed upon arrival. Pt left sitting OOB in recliner with all needs within reach   Transfers   Sit to Stand 3  Moderate assistance   Additional items Assist x 1; Increased time required;Verbal cues   Stand to Sit 4  Minimal assistance   Additional items Assist x 1; Increased time required;Verbal cues   Additional Comments transfers with HHA   Ambulation/Elevation   Gait pattern Excessively slow; Short stride;Decreased foot clearance; Improper Weight shift   Gait Assistance 3  Moderate assist   Additional items Assist x 1;Verbal cues; Tactile cues   Assistive Device   (HHA)   Distance 4' to recliner, deferred further mobility 2* fatigue   Balance   Static Sitting Fair -   Dynamic Sitting Poor +   Static Standing Poor   Dynamic Standing Poor   Ambulatory Poor -   Endurance Deficit   Endurance Deficit Yes   Activity Tolerance   Activity Tolerance Patient limited by fatigue   Medical Staff [id number] [facility]; co-session completed this date 2* increased medical complexity and multiple co-morbidities   Nurse Made Aware RN cleared pt to participate in therapy session   Assessment   Prognosis Fair   Problem List Decreased strength;Decreased endurance; Impaired balance;Decreased mobility; Decreased cognition;Decreased safety awareness; Impaired vision   Assessment Pt is a 74 y.o. adult seen for PT evaluation s/p admit to [facility] on 10/23/2023. Pt was admitted with a primary dx of: CKD, elevated serum creatinine, TAVON, s/p fall. Negative trauma work-up for acute injury. PT now consulted for assessment of mobility and d/c needs. Pt with Up and OOB as tolerated  orders. 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 Pt  has a past medical history of Cirrhosis ([address] [facility]) and Dementia ([address] [facility]). Pts current clinical presentation is Unstable/ Unpredictable (high complexity) due to Ongoing medical management for primary dx, Increased reliance on more restrictive AD compared to baseline, Decreased activity tolerance compared to baseline, Fall risk, Increased assistance needed from caregiver at current time, Ongoing telemetry monitoring, Cog status, Trending lab values, Continuous pulse oximetry monitoring . Prior to admission, pt was residing with family in [address] [place] [address] with 2 [facility] and was independent- info obtained from chart review as pt poor historian. Upon evaluation, pt currently is requiring mod A for bed mobility; mod A for transfers; mod A for ambulation 4 ft w/  HHA .  Pt presents at PT eval functioning below baseline and currently w/ overall mobility deficits 2* to: BLE weakness, impaired balance, decreased endurance, gait deviations, decreased activity tolerance compared to baseline, decreased functional mobility tolerance compared to baseline, decreased safety awareness, impaired judgement, fall risk, decreased cognition. Pt currently at a fall risk 2* to impairments listed above. Pt will continue to benefit from skilled acute PT interventions to address stated impairments; to maximize functional mobility; for ongoing pt/ family training; and DME needs. At conclusion of PT session pt returned back in chair and chair alarm engaged with phone and call bell within reach. Pt denies any further questions at this time. The patient's AM-PAC Basic Mobility Inpatient Short Form Raw Score is 13. A Raw score of less than or equal to 16 suggests the patient may benefit from discharge to post-acute rehabilitation services. Please also refer to the recommendation of the Physical Therapist for safe discharge planning. Recommend STR upon hospital D/C. Barriers to Discharge Inaccessible home environment   Goals   Patient Goals to have breakfast   STG Expiration Date 11/07/23   Short Term Goal #1 STG 1. Pt will be able to perform bed mobility tasks with S level in order to improve overall functional mobility and assist in safe d/c. STG 2. Pt with sit EOB for at least 25 minutes at S level in order to strengthen abdominal musculature and assist in future transfers/ ambulation. STG 3. Pt will be able to perform functional transfer with S level in order to improve overall functional mobility and assist in safe d/c. STG 4. Pt will be able to ambulate at least 250 feet with least restrictive device with S level A in order to improve overall functional mobility and assist in safe d/c. STG 5. Pt will improve sitting/standing static/dynamic balance 1/2 grade in order to improve functional mobility and assist in safe d/c. STG 6.  Pt will improve LE strength by 1/2 grade in order to improve functional mobility and assist in safe d/c. STG 7. Pt will be able to negotiate at least 2 stairs with least restrictive device with S level A in order to improve overall functional mobility and assist in safe d/c.   PT Treatment Day 0   Plan   Treatment/Interventions Functional transfer training;LE strengthening/ROM; Elevations; Therapeutic exercise; Endurance training;Patient/family training;Cognitive reorientation;Equipment eval/education; Bed mobility;Gait training;Spoke to nursing;Spoke to case management;OT   PT Frequency 3-5x/wk   Discharge Recommendation   PT Discharge Recommendation Post acute rehabilitation services   AM-PAC Basic Mobility Inpatient   Turning in Flat Bed Without Bedrails 3   Lying on Back to Sitting on Edge of Flat Bed Without Bedrails 2   Moving Bed to Chair 2   Standing Up From Chair Using Arms 2   Walk in Room 2   Climb 3-5 Stairs With Railing 2   Basic Mobility Inpatient Raw Score 13   Basic Mobility Standardized Score 33.99   Highest Level Of Mobility   JH-HLM Goal 4: Move to chair/commode   JH-HLM Achieved 4: Move to chair/commode   Modified Ambar Scale   Modified Ambar Scale 4         Karine Palmer, PT DPT

## 2023-10-24 NOTE — H&P
[address] [facility]  H&P  Name: Cecilia Roche 74 y.o. adult I MRN: [id number]  Unit/Bed#: UH5 870-01 I Date of Admission: 10/23/2023   Date of Service: 10/23/2023 I Hospital Day: 0      Assessment/Plan   * Fall  Assessment & Plan  Past medical history of vertigo, dementia and cirrhosis presenting after multiple falls over last 2 days. After discussion with family, patient has been dizzy for several weeks with poor oral intake along with complaints of urinary frequency and dysuria. Meclizine as needed  Obtain orthostatic vitals  Work-up revealed UTI  PT/OT    UTI (urinary tract infection)  Assessment & Plan  History of UTIs  Patient denies any dysuria however family reports that he has been complaining of dysuria for the last few days with mild confusion  UA with signs of infection  CT A/P with right renal atrophy, moderate right hydronephrosis with right ureteral stent. There is mild diffuse right urothelial thickening and enhancement. There is also mild diffuse thickening of the urinary bladder and adjacent fat stranding. This may be explained by infectious etiology including right ureteritis and cystitis.   Prior CT scan reviewed this similar ureteritis findings however cystitis appears to be new  Bcx ordered  Start IV ceftriaxone  Follow-up urine culture    Elevated serum creatinine  Assessment & Plan  History of CKD with baseline creatinine of 3.3-3.6  Presenting with creatinine of 4.2  Not meeting criteria for TAVON though likely elevated secondary to underlying CKD and poor oral intake  Received IV contrast for trauma scans  IVF hydration  Does not wish to pursue dialysis  Monitor creatinine in AM  Avoid nephrotoxic agents and relative hypotension  Consider nephrology consultation in the morning if creatinine continues to rise    Anemia  Assessment & Plan  Hgb 8.0, stable from baseline  Continue to monitor    History of CVA (cerebrovascular accident)  Assessment & Plan  Remote history of CVA  Continue Plavix    Thrombocytopenia (HCC)  Assessment & Plan  Lab Results   Component Value Date    PLT 68 (L) 10/23/2023     Chronic at baseline    CKD (chronic kidney disease)  Assessment & Plan  Lab Results   Component Value Date    CREATININE 4.25 (H) 10/23/2023       Dementia ([address])  Assessment & Plan  A&O x4  Outpatient follow-up  Delirium precautions    T2DM (type 2 diabetes mellitus) ([address])  Assessment & Plan  No results found for: "HGBA1C"    No results for input(s): "POCGLU" in the last 72 hours. Blood Sugar Average: Last 72 hrs:    Not on any oral agents  SSI while inpatient  Monitor glucose closely    History of liver transplant [facility])  Assessment & Plan  History of liver transplant secondary to alcoholic cirrhosis  Continue tacrolimus 1 mg every 12 hours  Follow-up with GI outpatient    Insomnia  Assessment & Plan  Continue temazepam and Ambien which patient has been on for 30 years  Confirmed with PDMP    Bladder cancer 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 [facility])  Assessment & Plan  History of stage IV bladder tumor status post resection and radiation with metastasis to the lungs  Not a candidate for further therapy cancer directed treatment  No pain currently  Supportive care  Continue nortriptyline and Lyrica, PDMP reviewed  Follows up with palliative care outpatient         VTE Pharmacologic Prophylaxis: VTE Score: 6 High Risk (Score >/= 5) - Pharmacological DVT Prophylaxis Ordered: heparin. Sequential Compression Devices Ordered. Code Status: Level 3 - DNAR and DNI   Discussion with family: Updated  (wife) at bedside. Anticipated Length of Stay: Patient will be admitted on an inpatient basis with an anticipated length of stay of greater than 2 midnights secondary to trauma and fall requiring PT/OT and UTI. Total Time Spent on Date of Encounter in care of patient: 75 mins.  This time was spent on one or more of the following: performing physical exam; counseling and coordination of care; obtaining or reviewing history; documenting in the medical record; reviewing/ordering tests, medications or procedures; communicating with other healthcare professionals and discussing with patient's family/caregivers. Chief Complaint: fall     History of Present Illness:  Ana Barnes is a 74 y.o. adult with a PMH of metastatic bladder cancer to the lungs, history of liver transplant on tacrolimus, dementia, vertigo on meclizine prn, CKD refusing dialysis, history of hepatitis C, history of CVA who presents after multiple falls. Trauma work-up negative. Unknown head strike although patient denies. Patient had 2 falls yesterday and one fall today prior to admission. Patient reports has been feeling dizzy for the last several days. Wife reports that patient has not been eating well and has not been drinking enough water. He also has been complaining of dysuria to the family but currently denies any dysuria. Patient reports that he was unable to get up on his own and one of his neighbors help get him up. He reports he was on the floor for a while until his daughter was able to get help. Currently patient denies any other complaints. Review of Systems:  Review of Systems   Constitutional:  Negative for chills and fever. HENT:  Negative for congestion and sore throat. Eyes:  Negative for pain and visual disturbance. Respiratory:  Negative for cough and shortness of breath. Cardiovascular:  Negative for chest pain and leg swelling. Gastrointestinal:  Negative for abdominal pain, constipation, diarrhea, nausea and vomiting. Genitourinary:  Positive for dysuria. Negative for flank pain. Musculoskeletal:  Positive for gait problem. Negative for back pain. Skin:  Negative for rash and wound. Neurological:  Positive for dizziness and light-headedness. Negative for headaches. Psychiatric/Behavioral:  Negative for agitation and confusion.         Past Medical and Surgical History: Past Medical History:   Diagnosis Date    Cirrhosis ([address] [facility])     Dementia ([address] [facility])        History reviewed. No pertinent surgical history. Meds/Allergies:  Prior to Admission medications    Medication Sig Start Date End Date Taking? Authorizing Provider   clopidogrel (PLAVIX) 75 mg tablet Take 75 mg by mouth daily   Yes Historical Provider, MD   nortriptyline (PAMELOR) 10 mg capsule Take 10 mg by mouth daily at bedtime   Yes Historical Provider, MD   oxybutynin (DITROPAN) 5 mg tablet Take 5 mg by mouth 2 (two) times a day   Yes Historical Provider, MD   pantoprazole (PROTONIX) 40 mg tablet Take 40 mg by mouth daily   Yes Historical Provider, MD   pregabalin (LYRICA) 75 mg capsule Take 75 mg by mouth 2 (two) times a day   Yes Historical Provider, MD   rosuvastatin (CRESTOR) 40 MG tablet Take 40 mg by mouth daily   Yes Historical Provider, MD   sucralfate (CARAFATE) 1 g tablet Take 1 g by mouth 3 (three) times a day as needed   Yes Historical Provider, MD   tacrolimus (PROGRAF) 1 mg capsule Take 1 mg by mouth every 12 (twelve) hours   Yes Historical Provider, MD   tamsulosin (Flomax) 0.4 mg Take 0.4 mg by mouth daily with dinner   Yes Historical Provider, MD   temazepam (RESTORIL) 7.5 mg capsule Take 15 mg by mouth daily at bedtime as needed for sleep   Yes Historical Provider, MD   zolpidem (Ambien) 5 mg tablet Take 5 mg by mouth daily at bedtime as needed for sleep   Yes Historical Provider, MD   albuterol (PROVENTIL HFA,VENTOLIN HFA) 90 mcg/act inhaler Inhale 2 puffs every 6 (six) hours as needed for wheezing    Historical Provider, MD   loperamide (IMODIUM) 2 mg capsule Take 2 mg by mouth 4 (four) times a day as needed for diarrhea    Historical Provider, MD   meclizine (ANTIVERT) 25 mg tablet Take 25 mg by mouth every 12 (twelve) hours as needed for dizziness    Historical Provider, MD SALAS have reviewed home medications with patient family member.     Allergies: No Known Allergies    Social History:  Marital Status: Single   Occupation: retired  Patient Pre-hospital Living Situation: Home  Patient Pre-hospital Level of Mobility: walks  Patient Pre-hospital Diet Restrictions: none  Substance Use History:   Social History     Substance and Sexual Activity   Alcohol Use None     Social History     Tobacco Use   Smoking Status Not on file   Smokeless Tobacco Not on file     Social History     Substance and Sexual Activity   Drug Use Not on file       Family History:  No family history on file. Physical Exam:     Vitals:   Blood Pressure: 104/57 (10/23/23 2017)  Pulse: 85 (10/23/23 2017)  Temperature: 99.7 °F (37.6 °C) (10/23/23 2017)  Temp Source: Oral (10/23/23 2017)  Respirations: 18 (10/23/23 2017)  Height: 5' 3" (160 cm) (10/23/23 1950)  Weight - Scale: 56.2 kg (124 lb) (10/23/23 1950)  SpO2: 90 % (10/23/23 2017)    Physical Exam  Vitals reviewed. Constitutional:       General: Theron Enrique is not in acute distress. Appearance: Normal appearance. Theron Enrique is not ill-appearing. HENT:      Head: Normocephalic and atraumatic. Eyes:      General: No scleral icterus. Conjunctiva/sclera: Conjunctivae normal.   Cardiovascular:      Rate and Rhythm: Normal rate and regular rhythm. Heart sounds: Normal heart sounds. No murmur heard. Pulmonary:      Effort: Pulmonary effort is normal. No respiratory distress. Breath sounds: Normal breath sounds. No wheezing or rales. Abdominal:      General: Bowel sounds are normal.      Palpations: Abdomen is soft. Tenderness: There is no abdominal tenderness. Musculoskeletal:      Right lower leg: No edema. Left lower leg: No edema. Skin:     General: Skin is warm and dry. Neurological:      Mental Status: Theron Enrique is alert and oriented to person, place, and time. Mental status is at baseline. Motor: No weakness.    Psychiatric:         Mood and Affect: Mood normal.         Behavior: Behavior normal.        Additional Data:     Lab Results:  Results from last 7 days   Lab Units 10/23/23  1133   WBC Thousand/uL 8.06   HEMOGLOBIN g/dL 8.0*   HEMATOCRIT % 25.7*   PLATELETS Thousands/uL 68*   NEUTROS PCT % 79*   LYMPHS PCT % 9*   MONOS PCT % 10   EOS PCT % 1     Results from last 7 days   Lab Units 10/23/23  1133   SODIUM mmol/L 135   POTASSIUM mmol/L 4.5   CHLORIDE mmol/L 108   CO2 mmol/L 16*   BUN mg/dL 56*   CREATININE mg/dL 4.25*   ANION GAP mmol/L 11   CALCIUM mg/dL 7.7*   ALBUMIN g/dL 3.3*   TOTAL BILIRUBIN mg/dL 0.44   ALK PHOS U/L 59   ALT U/L 16   AST U/L 37   GLUCOSE RANDOM mg/dL 188*                       Lines/Drains:  Invasive Devices       Central Venous Catheter Line  Duration             Port A Cath Right -- days                    Central Line:  Goal for removal: N/A - Chronic PICC           Imaging: Reviewed radiology reports from this admission including: chest xray, chest CT scan, abdominal/pelvic CT, and CT head  CT abdomen pelvis with contrast   Final Result by Danial Borjas MD (10/23 1359)      1. Redemonstration of right renal atrophy and moderate right hydronephrosis with a double-J right ureteral stent. There is mild diffuse right urothelial thickening and enhancement. There is also mild diffuse thickening of the urinary bladder and adjacent    fat stranding. This may be explained by an infectious etiology including right ureteritis and cystitis. 2. Skin thickening and/or subcutaneous edema in the periumbilical area may be due to history of trauma. Workstation performed: [id number]         CT head without contrast   Final Result by Leny Kincaid DO (10/23 1300)   Addendum (preliminary) 1 of 1 by Leny Kincaid DO (10/23 1300)   ADDENDUM:   Prior from July 31, 2023 performed under separate medical record number. Prior left-sided aneurysm clipping and local encephalomalacia is    unchanged.          I personally discussed the CT brain and C-spine with Clarence    on 10/23/2023 1:00 PM. Final   No acute intracranial abnormality. Workstation performed: [id number]         CT spine cervical without contrast   Final Result by Bharat Smith DO (10/23 1253)   No cervical spine fracture or traumatic malalignment. Workstation performed: [id number]         XR trauma multiple   Final Result by Lore Bedoya MD (10/23 3703)      Spiculated mass in the right upper lobe, little changed since 8/1/2023. Interval increase in size of bilateral pulmonary metastases. No evidence of acute traumatic abnormality in the chest.      The study was marked in [facility] for immediate notification. Workstation performed: [id number]         XR chest portable    (Results Pending)       EKG and Other Studies Reviewed on Admission:   EKG: NSR. HR 96.    ** Please Note: This note has been constructed using a voice recognition system.  **

## 2023-10-24 NOTE — CONSULTS
Progress Note - Urology  Dony Morse 1950, 74 y.o. adult MRN: [id number]    Unit/Bed#: OV6 870-01 Encounter: [id number]    UTI (urinary tract infection)  Assessment & Plan  Urine microscopic with innumerable WBCs, occasional bacteria  Urine culture pending  CT: Mild fairly diffuse urothelial thickening and enhancement on the right, mild diffuse thickening of the urinary bladder and adjacent fat stranding  Empiric antibiotics tailored to culture  Medical optimization per primary team    Elevated serum creatinine  Assessment & Plan  Creat 4.0, baseline 2.5-3.6  CT: Right renal atrophy and moderate right hydronephrosis with right double-J stent in place  Recommend hydration per primary team  Mcallister catheter placement for maximum drainage; pt is refusing  Medical optimization per primary team  No need for surgical intervention at this time; has metal stent good for 1 year    Bladder cancer 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 [facility])  Assessment & Plan  TURBT May 2022 for muscle invasive bladder cancer with inability to visualize distal right ureter which required nephrostomy tube eventually transition to PCNU and now with internalized stent  Did not tolerate chemotherapy due to toxicity  Completed radiation        Subjective: 15-year-old male with known history of advanced bladder cancer and right ureteral obstruction. Patient previously underwent nephrostomy tube removal and internal stent placement. His last stent exchange was 4/12/2023 at which time metal stent was placed which is good for 1 year. He was to follow-up with urology in 6 months but presents to the hospital yesterday due to multiple falls, dizziness, poor appetite and poor po intake. He was found to have a UTI. His baseline creatinine is 2.5-3.6. Previous CT scan revealed right renal atrophy with mild hydronephrosis. Current CT scan again redemonstrates right renal atrophy with moderate right hydronephrosis with right double-J stent in place.   Has urethral stricture and ureteral stricture. He reports having urinary burning for one month but did not tell his family until he was weak and falling. He is aware of our recommendation for felix catheter placement however refuses this. His wife and daughter attempted to persuade him to have the catheter but he continues to refuse. He has a history of metastatic bladder cancer to the lungs, history of liver transplant on tacrolimus, dementia, vertigo, CKD (refused dialysis previously), hepatitis C, CVA. Presented to the hospital due to multiple falls, poor appetite and p.o. intake, several days of dizziness and weakness. Review of Systems   Constitutional:  Positive for activity change, appetite change and fatigue. Negative for chills, fever and unexpected weight change. HENT:  Negative for facial swelling. Eyes:  Negative for discharge. Respiratory: Negative. Negative for cough and shortness of breath. Cardiovascular:  Negative for chest pain and leg swelling. Gastrointestinal:  Positive for abdominal pain. Negative for abdominal distention, constipation, diarrhea, nausea and vomiting. Endocrine: Negative. Genitourinary:  Positive for dysuria and flank pain. Negative for decreased urine volume, difficulty urinating, enuresis, frequency, genital sores, hematuria and urgency. Musculoskeletal:  Negative for back pain and myalgias. Skin:  Negative for pallor and rash. Allergic/Immunologic: Negative. Negative for immunocompromised state. Neurological:  Negative for facial asymmetry and speech difficulty. Psychiatric/Behavioral:  Negative for agitation and confusion. Objective:  Nursing Rounds:   Vitals: Blood pressure 93/53, pulse 90, temperature 99 °F (37.2 °C), temperature source Oral, resp. rate 19, height 5' 3" (1.6 m), weight 57 kg (125 lb 10.6 oz), SpO2 100 %. ,Body mass index is 22.26 kg/m². INS & OUTS:  I/O last 24 hours:   In: -   Out: 100 [Urine:100]    Physical Exam  Vitals and nursing note reviewed. Constitutional:       General: Jane Bear is not in acute distress. Appearance: Normal appearance. Jane Bear is not ill-appearing, toxic-appearing or diaphoretic. HENT:      Head: Normocephalic. Cardiovascular:      Rate and Rhythm: Normal rate. Pulmonary:      Effort: Pulmonary effort is normal. No respiratory distress. Abdominal:      General: Abdomen is flat. There is no distension. Palpations: Abdomen is soft. Tenderness: There is no abdominal tenderness. There is no guarding or rebound. Genitourinary:     Penis: Uncircumcised. No phimosis or paraphimosis. Testes:         Right: Tenderness not present. Left: Tenderness not present. Comments: Voiding clear yellow urine   Musculoskeletal:         General: No swelling. Cervical back: Normal range of motion. Skin:     General: Skin is warm and dry. Neurological:      General: No focal deficit present. Mental Status: Jane Bear is alert and oriented to person, place, and time. Psychiatric:         Mood and Affect: Mood normal.         Behavior: Behavior normal.         Thought Content: Thought content normal.         Imaging:    CT ABDOMEN AND PELVIS WITH IV CONTRAST     INDICATION:   Abdominal pain, acute, nonlocalized  abdominal ttp. COMPARISON: CT abdomen pelvis 7/24/2023     TECHNIQUE:  CT examination of the abdomen and pelvis was performed. Multiplanar 2D reformatted images were created from the source data. This examination, like all CT scans performed in the [facility], was performed utilizing techniques to minimize radiation dose exposure, including the use of iterative reconstruction and automated exposure control. Radiation dose length   product (DLP) for this visit:  314.81 mGy-cm     IV Contrast:  100 mL of iohexol (OMNIPAQUE)  Enteric Contrast:  Enteric contrast was not administered.      FINDINGS:     ABDOMEN     LOWER CHEST:  No clinically significant abnormality identified in the visualized lower chest.     LIVER/BILIARY TREE: Transplant liver. No focal liver lesion. GALLBLADDER: Surgically absent. Mild biliary ductal prominence likely due to cholecystectomy status. SPLEEN:  Unremarkable. PANCREAS: The pancreas is atrophic. ADRENAL GLANDS:  Unremarkable. KIDNEYS/URETERS: Appearance of right renal atrophy and moderate right hydronephrosis. Right double-J stent has been placed with proximal loop in the right renal pelvis and distal loop in the urinary bladder. There is mild fairly diffuse urothelial   thickening and enhancement on the right. Small cyst in the upper pole of the left kidney. No left hydronephrosis. STOMACH AND BOWEL:  Unremarkable. APPENDIX:  No findings to suggest appendicitis. ABDOMINOPELVIC CAVITY:  No ascites. No pneumoperitoneum. No lymphadenopathy. VESSELS:  Atherosclerotic changes are present. No evidence of aneurysm. PELVIS     REPRODUCTIVE ORGANS: Coarse calcifications in the prostate gland. URINARY BLADDER: Diffusely thickened appearance of the urinary bladder. The bladder is not well distended, limiting evaluation. There is mild adjacent fat stranding. ABDOMINAL WALL/INGUINAL REGIONS skin thickening or mild subcutaneous edema in the mid abdominal periumbilical area. OSSEOUS STRUCTURES:  No acute fracture or destructive osseous lesion. Spinal degenerative changes are noted. IMPRESSION:     1. Redemonstration of right renal atrophy and moderate right hydronephrosis with a double-J right ureteral stent. There is mild diffuse right urothelial thickening and enhancement. There is also mild diffuse thickening of the urinary bladder and adjacent   fat stranding. This may be explained by an infectious etiology including right ureteritis and cystitis. 2. Skin thickening and/or subcutaneous edema in the periumbilical area may be due to history of trauma. Workstation performed: [id number]  Imaging reviewed - both report and images personally reviewed. Labs:  Recent Labs     10/23/23  1133 10/24/23  0534   WBC 8.06 6.24       Recent Labs     10/23/23  1133 10/24/23  0534   HGB 8.0* 7.5*     Recent Labs     10/23/23  1133 10/24/23  0534   HCT 25.7* 23.9*     Recent Labs     10/23/23  1133 10/24/23  0534   CREATININE 4.25* 4.20*     Lab Results   Component Value Date    HGB 7.5 (L) 10/24/2023    HCT 23.9 (L) 10/24/2023    WBC 6.24 10/24/2023    PLT 72 (L) 10/24/2023     Lab Results   Component Value Date    K 4.4 10/24/2023     10/24/2023    CO2 20 (L) 10/24/2023    BUN 52 (H) 10/24/2023    CREATININE 4.20 (H) 10/24/2023    CALCIUM 7.2 (L) 10/24/2023     Urinalysis: Innumerable WBC's per HPF, 1-2 RBC's per HPF, and patient all + bacteria  Urine Culture: Growth: Staphylococcus aureus and Sensitivities Pending    History:    Past Medical History:   Diagnosis Date    Cirrhosis ([address] )     Dementia ([address])      History reviewed. No pertinent surgical history. No family history on file.   Social History     Socioeconomic History    Marital status: Single     Spouse name: None    Number of children: None    Years of education: None    Highest education level: None   Occupational History    None   Tobacco Use    Smoking status: None    Smokeless tobacco: None   Substance and Sexual Activity    Alcohol use: None    Drug use: None    Sexual activity: None   Other Topics Concern    None   Social History Narrative    None     Social Determinants of Health     Financial Resource Strain: Not on file   Food Insecurity: Not on file   Transportation Needs: Not on file   Physical Activity: Not on file   Stress: Not on file   Social Connections: Not on file   Intimate Partner Violence: Not on file   Housing Stability: Not on file       The following portions of the patient's history were reviewed and updated as appropriate: allergies, current medications, past family history, past medical history, past social history, past surgical history and problem list    LUCY Delcid  Date: 10/24/2023 Time: 4:14 PM

## 2023-10-24 NOTE — PLAN OF CARE
Problem: Potential for Falls  Goal: Patient will remain free of falls  Description: INTERVENTIONS:  - Educate patient/family on patient safety including physical limitations  - Instruct patient to call for assistance with activity   - Consult OT/PT to assist with strengthening/mobility   - Keep Call bell within reach  - Keep bed low and locked with side rails adjusted as appropriate  - Keep care items and personal belongings within reach  - Initiate and maintain comfort rounds  - Make Fall Risk Sign visible to staff  - Offer Toileting every 4 Hours, in advance of need  - Initiate/Maintain 4alarm  - Obtain necessary fall risk management equipment: 4  - Apply yellow socks and bracelet for high fall risk patients  - Consider moving patient to room near nurses station  Outcome: Progressing     Problem: MOBILITY - ADULT  Goal: Maintain or return to baseline ADL function  Description: INTERVENTIONS:  -  Assess patient's ability to carry out ADLs; assess patient's baseline for ADL function and identify physical deficits which impact ability to perform ADLs (bathing, care of mouth/teeth, toileting, grooming, dressing, etc.)  - Assess/evaluate cause of self-care deficits   - Assess range of motion  - Assess patient's mobility; develop plan if impaired  - Assess patient's need for assistive devices and provide as appropriate  - Encourage maximum independence but intervene and supervise when necessary  - Involve family in performance of ADLs  - Assess for home care needs following discharge   - Consider OT consult to assist with ADL evaluation and planning for discharge  - Provide patient education as appropriate  Outcome: Progressing  Goal: Maintains/Returns to pre admission functional level  Description: INTERVENTIONS:  - Perform BMAT or MOVE assessment daily.   - Set and communicate daily mobility goal to care team and patient/family/caregiver.    - Collaborate with rehabilitation services on mobility goals if consulted  - Perform Range of Motion 4 times a day. - Reposition patient every 4 hours.   - Dangle patient 4 times a day  - Stand patient 4 times a day  - Ambulate patient 4 times a day  - Out of bed to chair 4 times a day   - Out of bed for meals 4 times a day  - Out of bed for toileting  - Record patient progress and toleration of activity level   Outcome: Progressing

## 2023-10-24 NOTE — ED PROVIDER NOTES
Emergency Department Airway Evaluation and Management Form    History  Obtained from: History of dementia, here with fall  Patient has no known allergies. Chief Complaint   Patient presents with    Fall     Unwitnessed fall on plavix     HPI    Past Medical History:   Diagnosis Date    Cirrhosis ([address] )     Dementia ([address])      History reviewed. No pertinent surgical history. No family history on file. I have reviewed and agree with the history as documented.     Review of Systems    Physical Exam  /58 (BP Location: Left arm)   Pulse 101   Temp 100 °F (37.8 °C) (Oral)   Resp 22   Ht 5' 3" (1.6 m)   Wt 57 kg (125 lb 10.6 oz)   SpO2 100%   BMI 22.26 kg/m²     Physical Exam  Adequate vital signs, clear and equal breath sounds, no pooling secretions, trachea midline  ED Medications  Medications   clopidogrel (PLAVIX) tablet 75 mg (75 mg Oral Given 10/24/23 0901)   nortriptyline (PAMELOR) capsule 10 mg (10 mg Oral Given 10/23/23 2359)   oxybutynin (DITROPAN) tablet 5 mg (5 mg Oral Given 10/24/23 0901)   pantoprazole (PROTONIX) EC tablet 40 mg (40 mg Oral Given 10/24/23 0901)   pregabalin (LYRICA) capsule 75 mg (75 mg Oral Given 10/24/23 0909)   atorvastatin (LIPITOR) tablet 80 mg (80 mg Oral Given 10/23/23 1739)   sucralfate (CARAFATE) tablet 1 g (1 g Oral Given 10/24/23 1147)   tacrolimus (PROGRAF) capsule 1 mg (1 mg Oral Given 10/24/23 0901)   tamsulosin (FLOMAX) capsule 0.4 mg (0.4 mg Oral Given 10/23/23 1739)   temazepam (RESTORIL) capsule 15 mg (15 mg Oral Given 10/23/23 2237)   zolpidem (AMBIEN) tablet 5 mg (5 mg Oral Given 10/23/23 2237)   multi-electrolyte (PLASMALYTE-A/ISOLYTE-S PH 7.4) IV solution (100 mL/hr Intravenous New Bag 10/23/23 1722)   heparin (porcine) subcutaneous injection 5,000 Units (5,000 Units Subcutaneous Given 10/24/23 0644)   acetaminophen (TYLENOL) tablet 650 mg (650 mg Oral Given 10/23/23 3242)   cefTRIAXone (ROCEPHIN) 1,000 mg in dextrose 5 % 50 mL IVPB (1,000 mg Intravenous New Bag 10/23/23 2241)   insulin lispro (HumaLOG) 100 units/mL subcutaneous injection 1-5 Units (1 Units Subcutaneous Given 10/24/23 1220)   meclizine (ANTIVERT) tablet 25 mg (has no administration in time range)   sodium bicarbonate tablet 650 mg (650 mg Oral Given 10/24/23 1147)   diphenhydrAMINE (BENADRYL) injection 50 mg (50 mg Intravenous Given 10/23/23 1239)   multi-electrolyte (ISOLYTE-S PH 7.4) bolus 1,000 mL (0 mL Intravenous Stopped 10/23/23 1453)   iohexol (OMNIPAQUE) 350 MG/ML injection (MULTI-DOSE) 100 mL (100 mL Intravenous Given 10/23/23 1302)   HYDROmorphone (DILAUDID) injection 0.5 mg (0.5 mg Intravenous Given 10/23/23 1306)   HYDROmorphone (DILAUDID) injection 0.5 mg (0.5 mg Intravenous Given 10/23/23 1534)       Intubation  Procedures    Notes      Final Diagnosis  Final diagnoses:   Fall, initial encounter     Fall, stable airway  ED Provider  Electronically Signed by     Nick Lee MD  10/24/23 1312

## 2023-10-24 NOTE — PROGRESS NOTES
[facility]  Progress Note  Name: Lenka Moore  MRN: [id number]  Unit/Bed#: GX2 870-01 I Date of Admission: 10/23/2023   Date of Service: 10/24/2023 I Hospital Day: 1    Assessment/Plan   * Fall  Assessment & Plan  Past medical history of vertigo, dementia and cirrhosis presenting after multiple falls over last 2 days. After discussion with family, patient has been dizzy for several weeks with poor oral intake along with complaints of urinary frequency and dysuria. Meclizine as needed  Obtain orthostatic vitals  Work-up revealed UTI  PT/OT    UTI (urinary tract infection)  Assessment & Plan  History of UTIs  Patient denies any dysuria however family reports that he has been complaining of dysuria for the last few days with mild confusion  UA with signs of infection  CT A/P with right renal atrophy, moderate right hydronephrosis with right ureteral stent. There is mild diffuse right urothelial thickening and enhancement. There is also mild diffuse thickening of the urinary bladder and adjacent fat stranding. This may be explained by infectious etiology including right ureteritis and cystitis.   Prior CT scan reviewed this similar ureteritis findings however cystitis appears to be new  Bcx ordered  Awaiting urine culture  Continue ceftriaxone  Consult with urology for evaluation to determine whether his stent needs exchanging    Elevated serum creatinine  Assessment & Plan  History of CKD with baseline creatinine of 3.3-3.6  Likely TAVON on CKD due to poor po intake and possible contrast nephropathy  Presenting with creatinine of 4.2  Not meeting criteria for TAVON though likely elevated secondary to underlying CKD and poor oral intake  Received IV contrast for trauma scans  IVF hydration  Does not wish to pursue dialysis  Monitor creatinine daily  Avoid nephrotoxic agents and relative hypotension  Nephrology following    Anemia  Assessment & Plan  Hgb 8.0, stable from baseline  Continue to monitor    History of CVA (cerebrovascular accident)  Assessment & Plan  Remote history of CVA  Continue Plavix    Thrombocytopenia (HCC)  Assessment & Plan  Lab Results   Component Value Date    PLT 68 (L) 10/23/2023     Chronic at baseline    CKD (chronic kidney disease)  Assessment & Plan  Lab Results   Component Value Date    CREATININE 4.25 (H) 10/23/2023   Plan as above    Dementia ([address])  Assessment & Plan  A&O x4  Outpatient follow-up  Delirium precautions    T2DM (type 2 diabetes mellitus) ([address])  Assessment & Plan  No results found for: "HGBA1C"    No results for input(s): "POCGLU" in the last 72 hours. Blood Sugar Average: Last 72 hrs:    Not on any oral agents  SSI while inpatient  Monitor glucose closely    History of liver transplant [facility])  Assessment & Plan  History of liver transplant secondary to alcoholic cirrhosis  Continue tacrolimus 1 mg every 12 hours  Follow-up with GI outpatient    Insomnia  Assessment & Plan  Continue temazepam and Ambien which patient has been on for 30 years  Confirmed with PDMP    Bladder cancer 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 [facility])  Assessment & Plan  History of stage IV bladder tumor status post resection and radiation with metastasis to the lungs  Not a candidate for further therapy cancer directed treatment  No pain currently  Supportive care  Continue nortriptyline and Lyrica, PDMP reviewed  Follows up with palliative care outpatient               VTE Pharmacologic Prophylaxis: VTE Score: 6 Moderate Risk (Score 3-4) - Pharmacological DVT Prophylaxis Ordered: heparin. Patient Centered Rounds: I performed bedside rounds with nursing staff today. Discussions with Specialists or Other Care Team Provider: nurse, CM, urology    Education and Discussions with Family / Patient: Updated  (significant other) at bedside. Total Time Spent on Date of Encounter in care of patient: 45 mins.  This time was spent on one or more of the following: performing physical exam; counseling and coordination of care; obtaining or reviewing history; documenting in the medical record; reviewing/ordering tests, medications or procedures; communicating with other healthcare professionals and discussing with patient's family/caregivers. Current Length of Stay: 1 day(s)  Current Patient Status: Inpatient   Certification Statement: The patient will continue to require additional inpatient hospital stay due to UTI  Discharge Plan: Anticipate discharge in 48-72 hrs to home. Code Status: Level 3 - DNAR and DNI    Subjective:   Denies any new complaints. Feel fatigued with dysuria    Objective:     Vitals:   Temp (24hrs), Av.9 °F (37.7 °C), Min:99.7 °F (37.6 °C), Max:100 °F (37.8 °C)    Temp:  [99.7 °F (37.6 °C)-100 °F (37.8 °C)] 100 °F (37.8 °C)  HR:  [] 101  Resp:  [15-22] 22  BP: (104-120)/(57-67) 117/58  SpO2:  [90 %-100 %] 100 %  Body mass index is 22.26 kg/m². Input and Output Summary (last 24 hours): Intake/Output Summary (Last 24 hours) at 10/24/2023 1513  Last data filed at 10/24/2023 1100  Gross per 24 hour   Intake --   Output 100 ml   Net -100 ml       Physical Exam:   Physical Exam  Constitutional:       Appearance: Ana Barnes is ill-appearing. HENT:      Head: Normocephalic and atraumatic. Nose: Nose normal.   Eyes:      Extraocular Movements: Extraocular movements intact. Cardiovascular:      Rate and Rhythm: Normal rate and regular rhythm. Pulmonary:      Effort: Pulmonary effort is normal.      Breath sounds: No wheezing or rales. Abdominal:      General: There is no distension. Palpations: Abdomen is soft. Tenderness: There is no abdominal tenderness. Skin:     General: Skin is warm and dry. Neurological:      Mental Status: Ana Barnes is alert. Mental status is at baseline.    Psychiatric:         Mood and Affect: Mood normal.         Behavior: Behavior normal.          Additional Data:     Labs:  Results from last 7 days   Lab Units 10/24/23  0534   WBC Thousand/uL 6.24   HEMOGLOBIN g/dL 7.5*   HEMATOCRIT % 23.9*   PLATELETS Thousands/uL 72*   NEUTROS PCT % 81*   LYMPHS PCT % 9*   MONOS PCT % 8   EOS PCT % 1     Results from last 7 days   Lab Units 10/24/23  0534 10/23/23  1133   SODIUM mmol/L 134* 135   POTASSIUM mmol/L 4.4 4.5   CHLORIDE mmol/L 104 108   CO2 mmol/L 20* 16*   BUN mg/dL 52* 56*   CREATININE mg/dL 4.20* 4.25*   ANION GAP mmol/L 10 11   CALCIUM mg/dL 7.2* 7.7*   ALBUMIN g/dL  --  3.3*   TOTAL BILIRUBIN mg/dL  --  0.44   ALK PHOS U/L  --  59   ALT U/L  --  16   AST U/L  --  37   GLUCOSE RANDOM mg/dL 157* 188*         Results from last 7 days   Lab Units 10/24/23  1116 10/24/23  0757   POC GLUCOSE mg/dl 212* 198*               Lines/Drains:  Invasive Devices       Central Venous Catheter Line  Duration             Port A Cath Right -- days                    Central Line:  Goal for removal: Port accessed. Will de-access as appropriate. Imaging: Reviewed radiology reports from this admission including: abdominal/pelvic CT    Recent Cultures (last 7 days):   Results from last 7 days   Lab Units 10/23/23  2216 10/23/23  1534   BLOOD CULTURE  Received in Microbiology Lab. Culture in Progress.   --    URINE CULTURE   --  60,000-69,000 cfu/ml Staphylococcus aureus*       Last 24 Hours Medication List:   Current Facility-Administered Medications   Medication Dose Route Frequency Provider Last Rate    acetaminophen  650 mg Oral Q6H PRN Cele Verdugo, DO      atorvastatin  80 mg Oral Daily With [employer], DO      cefTRIAXone  1,000 mg Intravenous Q24H Cele Verdugo, DO 1,000 mg (10/23/23 2241)    clopidogrel  75 mg Oral Daily Cele Verdugo,       heparin (porcine)  5,000 Units Subcutaneous Q8H [address] Cele Verdugo,       insulin lispro  1-5 Units Subcutaneous TID AC Cele Verdugo,       meclizine  25 mg Oral Q8H PRN Theador Pilling, DO multi-electrolyte  100 mL/hr Intravenous Continuous Harnishkumar Verdugo,  mL/hr (10/23/23 1722)    nortriptyline  10 mg Oral HS Harnishkmario Verdugo, DO      oxybutynin  5 mg Oral BID Harnishkumar Verdugo, DO      pantoprazole  40 mg Oral Daily Harnishkumar Verdugo, DO      pregabalin  75 mg Oral BID Harnishkmario Verdugo, DO      sodium bicarbonate  650 mg Oral BID after meals Olga Morales MD      sucralfate  1 g Oral 4x Daily (AC & HS) Harkaushal Verdugo, DO      tacrolimus  1 mg Oral Q12H [facility] Harkaushal Verdugo, DO      tamsulosin  0.4 mg Oral Daily With [employer], DO      temazepam  15 mg Oral HS PRN Harkaushal Verdugo, DO      zolpidem  5 mg Oral HS PRN Pantera Herman,           Today, Patient Was Seen By: Neelam Hurd MD    **Please Note: This note may have been constructed using a voice recognition system. **

## 2023-10-24 NOTE — OCCUPATIONAL THERAPY NOTE
Occupational Therapy Evaluation      Leidy Hammonds    10/24/2023    Principal Problem:    Fall  Active Problems:    Bladder cancer 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 [facility])    Insomnia    History of liver transplant ([address])    T2DM (type 2 diabetes mellitus) (HCC)    Elevated serum creatinine    UTI (urinary tract infection)    Dementia (HCC)    CKD (chronic kidney disease)    Thrombocytopenia (HCC)    History of CVA (cerebrovascular accident)    Anemia      Past Medical History:   Diagnosis Date    Cirrhosis ([address])     Dementia ([address])        History reviewed. No pertinent surgical history. 10/24/23 0857   OT Last Visit   OT Visit Date 10/24/23   Note Type   Note type Evaluation   Pain Assessment   Pain Assessment Tool FLACC   Pain Rating: FLACC (Rest) - Face 0   Pain Rating: FLACC (Rest) - Legs 0   Pain Rating: FLACC (Rest) - Activity 0   Pain Rating: FLACC (Rest) - Cry 0   Pain Rating: FLACC (Rest) - Consolability 0   Score: FLACC (Rest) 0   Pain Rating: FLACC (Activity) - Face 0   Pain Rating: FLACC (Activity) - Legs 0   Pain Rating: FLACC (Activity) - Activity 0   Pain Rating: FLACC (Activity) - Cry 0   Pain Rating: FLACC (Activity) - Consolability 0   Score: FLACC (Activity) 0   Restrictions/Precautions   Weight Bearing Precautions Per Order No   Other Precautions Cognitive; Chair Alarm; Bed Alarm; Fall Risk;Pain   Home Living   Type of [address] [place] [address] Two level; Able to live on main level with bedroom/bathroom   Bathroom Shower/Tub Tub/shower unit   Bathroom Toilet Standard   Additional Comments pt is a poor historian. home set up taken from previous encounter   Prior Function   Lives With [employer] Help From Family   IADLs Family/Friend/Other provides transportation; Family/Friend/Other provides meals; Family/Friend/Other provides medication management   Falls in the last 6 months 1 to 4   Comments pt has at least 3 falls leading up to this hospitalization   Lifestyle   Autonomy unknown PLOF.  pt unable to report ADL   Eating Assistance 5  Supervision/Setup   Eating Deficit Setup   Grooming Assistance 4  Minimal Assistance   Grooming Deficit Setup; Increased time to complete   UB Bathing Assistance 4  Minimal Assistance   UB Bathing Deficit Setup; Increased time to complete   LB Bathing Assistance 3  Moderate Assistance   LB Bathing Deficit Setup; Increased time to complete   UB Dressing Assistance 4  Minimal Assistance   LB Dressing Assistance 2  Maximal Assistance   LB Dressing Deficit Don/doff R sock; Don/doff L sock   Toileting Assistance  3  Moderate Assistance   Bed Mobility   Supine to Sit 3  Moderate assistance   Additional items Assist x 1;HOB elevated; Increased time required;Verbal cues   Transfers   Sit to Stand 3  Moderate assistance   Additional items Assist x 1; Increased time required;Verbal cues   Stand to Sit 4  Minimal assistance   Stand pivot 3  Moderate assistance   Additional items Assist x 1; Increased time required;Verbal cues   Balance   Static Sitting Poor +   Dynamic Sitting Poor   Static Standing Poor   Dynamic Standing Poor -   Activity Tolerance   Activity Tolerance Patient limited by fatigue   Medical Staff Made Aware seen for co-eval with PT Raúl Felder due to medical complexity   Nurse Made Aware ok to see   RUE Assessment   RUE Assessment WFL   LUE Assessment   LUE Assessment WFL   Hand Function   Gross Motor Coordination Functional   Fine Motor Coordination Functional   Psychosocial   Psychosocial (WDL) WDL   Patient Behaviors/Mood Cooperative   Cognition   Overall Cognitive Status Impaired   Arousal/Participation Cooperative   Attention Attends with cues to redirect   Orientation Level Oriented to person;Disoriented to situation;Disoriented to place; Disoriented to time   Memory Decreased recall of precautions;Decreased recall of recent events;Decreased short term memory   Following Commands Follows one step commands with increased time or repetition   Assessment   Limitation Decreased ADL status; Decreased Safe judgement during ADL;Decreased endurance;Decreased self-care trans;Decreased high-level ADLs   Assessment Pt is a 74 y.o. adult seen for OT evaluation s/p admit to [facility] w/ Fall. Pt has been dizzy for weeks, poor po intake. 3 falls the past few days. pt  has a past medical history of Cirrhosis ([address]) and Dementia ([address]). 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 Personal factors affecting pt at time of IE include:behavioral pattern, difficulty performing ADLS, difficulty performing IADLS , and limited insight into deficits. Prior to admission, pt was living w family and sig. Other in a 2 SH. Per chart, he has a FFSU. Unknown PLOF. Upon evaluation: Pt requires mod/max assist LB self care, he demonstrates poor balance sitting unsupported at EOB, retropulses. Increased time to follow simple commands 2* the following deficits impacting occupational performance: weakness, decreased strength, decreased balance, decreased tolerance, impaired initiation, impaired memory, impaired problem solving, and decreased safety awareness. Pt to benefit from continued skilled OT tx while in the hospital to address deficits as defined above and maximize level of functional independence w ADL's and functional mobility. Occupational Performance areas to address include: grooming, bathing/shower, toilet hygiene, dressing, functional mobility, and clothing management. Based on findings from OT evaluation and functional performance deficits, pt has been identified as a  high complexity evaluation. The patient's raw score on the AM-PAC Daily Activity inpatient short form is 16, standardized score is 35.96, less than 39.4. Patients at this level are likely to benefit from discharge to post-acute rehabilitation services. From OT standpoint, recommendation at time of d/c would be STR as patient is currently functioning below baseline.    Goals   Patient Goals to eat his breakfast   Plan   Treatment Interventions ADL retraining;Functional transfer training; Endurance training;Patient/family training;Equipment evaluation/education; Compensatory technique education; Energy conservation; Activityengagement   Goal Expiration Date 11/07/23   OT Frequency 2-3x/wk   Discharge Recommendation   OT Discharge Recommendation Post acute rehabilitation services   AM-PAC Daily Activity Inpatient   Lower Body Dressing 2   Bathing 2   Toileting 2   Upper Body Dressing 3   Grooming 3   Eating 4   Daily Activity Raw Score 16   Daily Activity Standardized Score (Calc for Raw Score >=11) 35.96     OT GOALS TO BE ACHIEVED IN 14 DAYS:    Patient will complete bed mobility mod I  With good safety     Pt will demonstrate good balance sitting at EOB x 10 min for increased safety w self care and in preparation for increased indpendence    Pt will complete grooming independently with set up     Pt will complete UB bathing and dressing w supervision  Pt will complete LB bathing and dressing with min assist  Pt will complete toileting w mod I and good safety     Pt will complete functional transfers with S/use of DME as needed demonstrating good safety     Pt will tolerate standing at sinkside x 5 min w F+ balance for increased safety w hygiene    Pt will participate in ongoing cognitive assessment and training as appropriate to assist w safest dc plan    Pt will complete functional mobility in room and bathroom w S and use of most appropriate DME    Pt will demonstrate good ECT/self pacing skills with all self care and functional mobility

## 2023-10-24 NOTE — PLAN OF CARE
Problem: PHYSICAL THERAPY ADULT  Goal: Performs mobility at highest level of function for planned discharge setting. See evaluation for individualized goals. Description: Treatment/Interventions: Functional transfer training, LE strengthening/ROM, Elevations, Therapeutic exercise, Endurance training, Patient/family training, Cognitive reorientation, Equipment eval/education, Bed mobility, Gait training, Spoke to nursing, Spoke to case management, OT          See flowsheet documentation for full assessment, interventions and recommendations. Note: Prognosis: Fair  Problem List: Decreased strength, Decreased endurance, Impaired balance, Decreased mobility, Decreased cognition, Decreased safety awareness, Impaired vision  Assessment: Pt is a 74 y.o. adult seen for PT evaluation s/p admit to [address] [place] [address] on 10/23/2023. Pt was admitted with a primary dx of: CKD, elevated serum creatinine, TAVON, s/p fall. Negative trauma work-up for acute injury. PT now consulted for assessment of mobility and d/c needs. Pt with Up and OOB as tolerated  orders. 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 Pt  has a past medical history of Cirrhosis ([address]) and Dementia ([address]). Pts current clinical presentation is Unstable/ Unpredictable (high complexity) due to Ongoing medical management for primary dx, Increased reliance on more restrictive AD compared to baseline, Decreased activity tolerance compared to baseline, Fall risk, Increased assistance needed from caregiver at current time, Ongoing telemetry monitoring, Cog status, Trending lab values, Continuous pulse oximetry monitoring . Prior to admission, pt was residing with family in [address] [place] [address] with [facility] and was independent- info obtained from chart review as pt poor historian.  Upon evaluation, pt currently is requiring mod A for bed mobility; mod A for transfers; mod A for ambulation 4 ft w/  HHA . Pt presents at PT eval functioning below baseline and currently w/ overall mobility deficits 2* to: BLE weakness, impaired balance, decreased endurance, gait deviations, decreased activity tolerance compared to baseline, decreased functional mobility tolerance compared to baseline, decreased safety awareness, impaired judgement, fall risk, decreased cognition. Pt currently at a fall risk 2* to impairments listed above. Pt will continue to benefit from skilled acute PT interventions to address stated impairments; to maximize functional mobility; for ongoing pt/ family training; and DME needs. At conclusion of PT session pt returned back in chair and chair alarm engaged with phone and call bell within reach. Pt denies any further questions at this time. The patient's AM-PAC Basic Mobility Inpatient Short Form Raw Score is 13. A Raw score of less than or equal to 16 suggests the patient may benefit from discharge to post-acute rehabilitation services. Please also refer to the recommendation of the Physical Therapist for safe discharge planning. Recommend STR upon hospital D/C. Barriers to Discharge: Inaccessible home environment     PT Discharge Recommendation: Post acute rehabilitation services    See flowsheet documentation for full assessment.

## 2023-10-24 NOTE — CONSULTS
Consultation - Nephrology   Joanne Caruso 74 y.o. adult MRN: [id number]  Unit/Bed#: VH1 870-01 Encounter: [id number]    ASSESSMENT AND PLAN:   74 y.o. 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 Admitted after a fall . Nephrology is consulted for management of TAVON     PLAN    #Non-Oliguric KDIGO TAVON stage 1 on CKD G4 versus progression of CKD  Etiology: Likely secondary to ATN in the settings of recurrent TAVON with progression of CKD. I anticipate worsening of kidney function since patient received IV contrast on admission  Baseline creatinine: 3.6 mg/dL in August  Current creatinine: 4.20 mg/dL, plateauing, possible new baseline  Peak creatinine: Trending  UA: No hematuria, leukocyturia  Renal imaging :   Right renal atrophy with hydronephrosis, right double-J stent. urothelial thickening. No left hydronephrosis  Treatment:  No urgent indication of dialysis at this time, will continue to monitor kidney function. No uremic symptoms  Maintain MAP:  Over 65 mmHg if possible/avoid hypoperfusion:  Hold parameters on blood pressure medications  Avoid nephrotoxic agents such as NSAIDs, and IV contrast if possible.  Avoid opioids   Adjust medications to GFR    #CKD G4/5  Baseline creatinine: 3.6 mg/dL in August  Etiology: Likely secondary to ATN in the settings of recurrent TAVON with component of obstructive uropathy and nephron loss with atrophic kidney      #Acid-base Disorder  serum HCO3 20 mmol/L  Metabolic acidosis start sodium bicarbonate 650 twice daily    #Volume status/hypertension:  Volume: Euvolemic  Status post Plasma-Lyte  Blood pressure: Normotensive, /58, goal  <140/90  Recommend:  Low-sodium diet   enforce fluid intake  No diuretics at this time      #Anemia:  Current hemoglobin: 7.5 mg/dL  Treatment:  Transfuse for hemoglobin less than 7.0 per primary service      #Fall  Dizziness, on meclizine   PT OT      #Liver transplant   on tacrolimus twice daily  Monitor levels      HISTORY OF PRESENT ILLNESS:  Requesting Physician: Sylvester Calloway MD  Reason for Consult: TAVON Gillette is a 74 y.o. 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 A renal consultation is requested today for assistance in the management of TAVON. PAST MEDICAL HISTORY:  Past Medical History:   Diagnosis Date    Cirrhosis ([address] [facility])     Dementia ([address] [facility])        PAST SURGICAL HISTORY:  History reviewed. No pertinent surgical history. ALLERGIES:  No Known Allergies    SOCIAL HISTORY:  Social History     Substance and Sexual Activity   Alcohol Use None     Social History     Substance and Sexual Activity   Drug Use Not on file     Social History     Tobacco Use   Smoking Status Not on file   Smokeless Tobacco Not on file       FAMILY HISTORY:  No family history on file.     MEDICATIONS:    Current Facility-Administered Medications:     acetaminophen (TYLENOL) tablet 650 mg, 650 mg, Oral, Q6H PRN, Cele De La Fuenteel, DO, 650 mg at 10/23/23 1727    atorvastatin (LIPITOR) tablet 80 mg, 80 mg, Oral, Daily With Dinner, [employer], DO, 80 mg at 10/23/23 1739    cefTRIAXone (ROCEPHIN) 1,000 mg in dextrose 5 % 50 mL IVPB, 1,000 mg, Intravenous, Q24H, Cele De La Fuenteel, DO, Last Rate: 100 mL/hr at 10/23/23 2241, 1,000 mg at 10/23/23 2241    clopidogrel (PLAVIX) tablet 75 mg, 75 mg, Oral, Daily, Everkmario Verdugo, DO, 75 mg at 10/24/23 0901    heparin (porcine) subcutaneous injection 5,000 Units, 5,000 Units, Subcutaneous, Q8H [address], Everkmario Verdugo, DO, 5,000 Units at 10/24/23 0644    insulin lispro (HumaLOG) 100 units/mL subcutaneous injection 1-5 Units, 1-5 Units, Subcutaneous, TID AC, 1 Units at 10/24/23 0900 **AND** Fingerstick Glucose (POCT), , , TID AC, Cele De La Fuenteel, DO    meclizine (ANTIVERT) tablet 25 mg, 25 mg, Oral, Q8H PRN, Cele De La Fuenteel, DO    multi-electrolyte (PLASMALYTE-A/ISOLYTE-S PH 7.4) IV solution, 100 mL/hr, Intravenous, Continuous, Cele Verdugo DO, Last Rate: 100 mL/hr at 10/23/23 1722, 100 mL/hr at 10/23/23 1722    nortriptyline (PAMELOR) capsule 10 mg, 10 mg, Oral, HS, Cele De La Fuenteel, DO, 10 mg at 10/23/23 2359    oxybutynin (DITROPAN) tablet 5 mg, 5 mg, Oral, BID, Cele Verdugo, DO, 5 mg at 10/24/23 0901    pantoprazole (PROTONIX) EC tablet 40 mg, 40 mg, Oral, Daily, Cele Verdugo, DO, 40 mg at 10/24/23 0901    pregabalin (LYRICA) capsule 75 mg, 75 mg, Oral, BID, Cele Verdugo, DO, 75 mg at 10/23/23 1727    sodium bicarbonate tablet 650 mg, 650 mg, Oral, BID after meals, Keke Onofre MD    sucralfate (CARAFATE) tablet 1 g, 1 g, Oral, 4x Daily (AC & HS), Cele De La Fuenteel, DO, 1 g at 10/24/23 0737    tacrolimus (PROGRAF) capsule 1 mg, 1 mg, Oral, Q12H [facility], Cele De La Fuenteel, DO, 1 mg at 10/24/23 0901    tamsulosin (FLOMAX) capsule 0.4 mg, 0.4 mg, Oral, Daily With Dinner, [employer], DO, 0.4 mg at 10/23/23 1739    temazepam (RESTORIL) capsule 15 mg, 15 mg, Oral, HS PRN, Cele De La Fuenteel, DO, 15 mg at 10/23/23 2237    zolpidem (AMBIEN) tablet 5 mg, 5 mg, Oral, HS PRN, Cele Verdugo, DO, 5 mg at 10/23/23 6956    REVIEW OF SYSTEMS:  Complete 10 point review of systems were obtained and discussed in length with the patient. Complete review of systems were negative / unremarkable except mentioned in the HPI section.      Review of Systems - Psychological ROS: negative  Ophthalmic ROS: negative  ENT ROS: negative  Hematological and Lymphatic ROS: negative  Endocrine ROS: negative  Respiratory ROS: no cough, shortness of breath, or wheezing  Cardiovascular ROS: no chest pain or dyspnea on exertion  Gastrointestinal ROS: no abdominal pain, change in bowel habits, or black or bloody stools  Genito-Urinary ROS: no dysuria, trouble voiding, or hematuria  Musculoskeletal ROS: negative  Neurological ROS: positive for - dizziness  Dermatological ROS: negative     PHYSICAL EXAM:  Current Weight: Weight - Scale: 57 kg (125 lb 10.6 oz)  First Weight: Weight - Scale: 56.4 kg (124 lb 5.4 oz)  Vitals:    10/24/23 0819   BP: 117/58   Pulse: 101   Resp: 22   Temp: 100 °F (37.8 °C)   SpO2: 100%     No intake or output data in the 24 hours ending 10/24/23 0906  Wt Readings from Last 3 Encounters:   10/24/23 57 kg (125 lb 10.6 oz)     Temp Readings from Last 3 Encounters:   10/24/23 100 °F (37.8 °C) (Oral)     BP Readings from Last 3 Encounters:   10/24/23 117/58     Pulse Readings from Last 3 Encounters:   10/24/23 101        General: Chronically ill cachectic  Skin:  No acute rash  Eyes:  No scleral icterus and noninjected  ENT:  mucous membranes moist  Neck:  no carotid bruits  Chest:  Clear to auscultation percussion, good respiratory effort, no use of accessory respiratory muscles  CVS:  Regular rate and rhythm without rub   Abdomen:  soft and nontender   Extremities: no significant lower extremity edema  Neuro:  No gross focality  Psych:  Alert , cooperative       Invasive Devices:      Lab Results:   Results from last 7 days   Lab Units 10/24/23  0534 10/23/23  1133   WBC Thousand/uL 6.24 8.06   HEMOGLOBIN g/dL 7.5* 8.0*   HEMATOCRIT % 23.9* 25.7*   PLATELETS Thousands/uL 72* 68*   POTASSIUM mmol/L 4.4 4.5   CHLORIDE mmol/L 104 108   CO2 mmol/L 20* 16*   BUN mg/dL 52* 56*   CREATININE mg/dL 4.20* 4.25*   CALCIUM mg/dL 7.2* 7.7*       Other Studies:   CT abdomen pelvis with contrast   Final Result by Caffie Sicard, MD (10/23 1793)      1. Redemonstration of right renal atrophy and moderate right hydronephrosis with a double-J right ureteral stent.  There is mild diffuse right urothelial thickening and enhancement. There is also mild diffuse thickening of the urinary bladder and adjacent    fat stranding. This may be explained by an infectious etiology including right ureteritis and cystitis. 2. Skin thickening and/or subcutaneous edema in the periumbilical area may be due to history of trauma. Workstation performed: [id number]         CT head without contrast   Final Result by Emely Meyer DO (10/23 1300)   Addendum (preliminary) 1 of 1 by Emely Meyer DO (10/23 1300)   ADDENDUM:   Prior from July 31, 2023 performed under separate medical record number. Prior left-sided aneurysm clipping and local encephalomalacia is    unchanged. I personally discussed the CT brain and C-spine with Clarence    on 10/23/2023 1:00 PM.            Final   No acute intracranial abnormality. Workstation performed: [id number]         CT spine cervical without contrast   Final Result by Emely Meyer DO (10/23 1253)   No cervical spine fracture or traumatic malalignment. Workstation performed: [id number]         XR trauma multiple   Final Result by Michelle Hernandez MD (10/23 1643)      Spiculated mass in the right upper lobe, little changed since 8/1/2023. Interval increase in size of bilateral pulmonary metastases. No evidence of acute traumatic abnormality in the chest.      The study was marked in [facility] for immediate notification. Workstation performed: [id number]         XR chest portable   Final Result by Michelle Hernandez MD (10/24 8315)      Spiculated mass in the right upper lobe, little changed since 8/1/2023. Interval increase in size of bilateral pulmonary metastases. No evidence of acute traumatic abnormality in the chest.      The study was marked in [facility] for immediate notification.          Workstation performed: [id number]             Portions of the record may have been created with voice recognition software. Occasional wrong word or "sound a like" substitutions may have occurred due to the inherent limitations of voice recognition software. Read the chart carefully and recognize, using context, where substitutions have occurred.

## 2023-10-24 NOTE — UTILIZATION REVIEW
Initial Clinical Review    Admission: Date/Time/Statement:   Admission Orders (From admission, onward)       Ordered        10/23/23 1557  INPATIENT ADMISSION  Once                          Orders Placed This Encounter   Procedures    INPATIENT ADMISSION     Standing Status:   Standing     Number of Occurrences:   1     Order Specific Question:   Level of Care     Answer:   Med Surg [16]     Order Specific Question:   Estimated length of stay     Answer:   More than 2 Midnights     Order Specific Question:   Certification     Answer:   I certify that inpatient services are medically necessary for this patient for a duration of greater than two midnights. See H&P and MD Progress Notes for additional information about the patient's course of treatment. ED Arrival Information       Expected   -    Arrival   10/23/2023 11:05    Acuity   Emergent              Means of arrival   Ambulance    Escorted by   JASSON Dickinson)    Service   Hospitalist    Admission type   Emergency              Arrival complaint   trauma             Chief Complaint   Patient presents with    Fall     Unwitnessed fall on plavix       Initial Presentation: 74 y.o. adult to ED presents for Multiple Falls. Trauma work-up negative. Unknown head strike, pt denies. Pt had two Falls yesterday and one Fall today on admit. Pt  reports has been feeling dizzy for the last several days. Per wife, pt has not been eating well and  has not been drinking enough water. Pt also had been c/o dysuria to the family but currently denies any dysuria. Pt unable to get up on his own and one of his neighbors help get him up per him. PMH for Metastatic bladder cancer to the lungs, Liver transplant on tacrolimus, dementia, vertigo on meclizine prn, CKD refusing dialysis, hepatitis C, CVA. Anemia. Thrombocytopenia. T2DM. Insomnia. UTI. Admit Inpatient level of care for Fall, UTI, Elevated serum creat. Poor po intake. Pt does not wish to pursue dialysis.  Meclizine prn. Bld culture. Start Iv antibiotics. F/u urine culture. Creat 4.2, baseline 3.3-3.6. Given Iv contrast for trauma scans. IVFs. Continue Plavix. CT A/P with right renal atrophy, moderate right hydronephrosis with right ureteral stent. There is mild diffuse right urothelial thickening and enhancement. There is also mild diffuse thickening of the urinary bladder and adjacent fat stranding. This may be explained by infectious etiology including right ureteritis and cystitis. Date: 10/24   Day 2:   Nephrology cons; On-Oliguric. Anticipate  worsening of kidney function since patient received IV contrast on admit. Baseline creat 3.6., current creat 4.20. No Urgent indication of dialysis at this time. Continue to monitor kidney function. BP: Normotensive, /58, goal  <140/90. Low-Na diet. Progress notes; Urine culture pending. Continue Iv antibiotics. Urology consult for evaluation to determine whether his stent needs exchanging. Monitor creat daily. Pt feels fatigues with dysuria. Urology cons; UTI, Elevated creat and Bladder cancer mets to lung. URBT May 2022 for muscle invasive bladder cancer with inability to visualize distal right ureter which required nephrostomy tube eventually transition to PCNU and now with internalized stent. Did not tolerate chemotherapy due to toxicity. Completed radiation. Urine microscopic with innumerable WBCs, occasional bacteria. Urine culture pending. Continue Iv antibiotics. Mcallister catheter placement for maximum drainage; pt is refusing. Medical optimization. No need for surgical intervention at this time; has metal stent good for 1 year. Date: 10/25    Day 3: Has surpassed a 2nd midnight with active treatments and services, which include UTI, Elevated serum creat, continued on Iv antibiotics/ IVFs.        ED Triage Vitals   Temperature Pulse Respirations Blood Pressure SpO2   10/23/23 1108 10/23/23 1108 10/23/23 1108 10/23/23 1108 10/23/23 1108   99 °F (37.2 °C) 98 18 134/63 100 %      Temp Source Heart Rate Source Patient Position - Orthostatic VS BP Location FiO2 (%)   10/23/23 1108 10/23/23 1108 10/23/23 1108 10/23/23 1130 --   Tympanic Monitor Lying Left arm       Pain Score       10/23/23 1130       6          Wt Readings from Last 1 Encounters:   10/24/23 57 kg (125 lb 10.6 oz)     Additional Vital Signs:   10/24/23 0819 100 °F (37.8 °C) 101 22 117/58 82 100 % None (Room air) Lying   10/23/23 2017 99.7 °F (37.6 °C) 85 18 104/57 73 90 % None (Room air) Lying   10/23/23 1950 -- 94 17 109/66 83 97 % None (Room air) Sitting   10/23/23 1915 -- 96 18 108/60 78 97 % None (Room air) Lying     10/23/23 1215 -- 90 16 127/63 90 99 % None (Room air) Lying   10/23/23 1200 -- 92 16 121/56 81 98 % None (Room air) Lying   10/23/23 1145 -- 98 18 120/55 79 98 % None (Room air) Lying     Pertinent Labs/Diagnostic Test Results:   CT abdomen pelvis with contrast   Final Result by Susy Gonzales MD (10/23 1359)      1. Redemonstration of right renal atrophy and moderate right hydronephrosis with a double-J right ureteral stent. There is mild diffuse right urothelial thickening and enhancement. There is also mild diffuse thickening of the urinary bladder and adjacent    fat stranding. This may be explained by an infectious etiology including right ureteritis and cystitis. 2. Skin thickening and/or subcutaneous edema in the periumbilical area may be due to history of trauma. Workstation performed: [id number]         CT head without contrast   Final Result by Al Ye DO (10/23 1300)   Addendum (preliminary) 1 of 1 by Al Ye DO (10/23 1300)   ADDENDUM:   Prior from July 31, 2023 performed under separate medical record number. Prior left-sided aneurysm clipping and local encephalomalacia is    unchanged.          I personally discussed the CT brain and C-spine with Clarence    on 10/23/2023 1:00 PM.            Final   No acute intracranial abnormality. Workstation performed: [id number]         CT spine cervical without contrast   Final Result by Aubrie Johnson DO (10/23 1253)   No cervical spine fracture or traumatic malalignment. Workstation performed: [id number]         XR trauma multiple   Final Result by Aj Johnson MD (10/23 1643)      Spiculated mass in the right upper lobe, little changed since 8/1/2023. Interval increase in size of bilateral pulmonary metastases. No evidence of acute traumatic abnormality in the chest.      The study was marked in [facility] for immediate notification. Workstation performed: [id number]         XR chest portable   Final Result by Aj Johnson MD (10/24 3042)      Spiculated mass in the right upper lobe, little changed since 8/1/2023. Interval increase in size of bilateral pulmonary metastases. No evidence of acute traumatic abnormality in the chest.      The study was marked in [facility] for immediate notification.          Workstation performed: [id number]               Results from last 7 days   Lab Units 10/24/23  0534 10/23/23  1133   WBC Thousand/uL 6.24 8.06   HEMOGLOBIN g/dL 7.5* 8.0*   HEMATOCRIT % 23.9* 25.7*   PLATELETS Thousands/uL 72* 68*   NEUTROS ABS Thousands/µL 5.06 6.39         Results from last 7 days   Lab Units 10/24/23  0534 10/23/23  1133   SODIUM mmol/L 134* 135   POTASSIUM mmol/L 4.4 4.5   CHLORIDE mmol/L 104 108   CO2 mmol/L 20* 16*   ANION GAP mmol/L 10 11   BUN mg/dL 52* 56*   CREATININE mg/dL 4.20* 4.25*   CALCIUM mg/dL 7.2* 7.7*     Results from last 7 days   Lab Units 10/23/23  1133   AST U/L 37   ALT U/L 16   ALK PHOS U/L 59   TOTAL PROTEIN g/dL 7.1   ALBUMIN g/dL 3.3*   TOTAL BILIRUBIN mg/dL 0.44     Results from last 7 days   Lab Units 10/24/23  0757   POC GLUCOSE mg/dl 198*     Results from last 7 days   Lab Units 10/24/23  0534 10/23/23  1133   GLUCOSE RANDOM mg/dL 157* 188*       Results from last 7 days Lab Units 10/23/23  2216 10/23/23  1502 10/23/23  1133   HS TNI 0HR ng/L  --   --  12   HS TNI 2HR ng/L  --  15  --    HSTNI D2 ng/L  --  3  --    HS TNI 4HR ng/L 19  --   --    HSTNI D4 ng/L 7  --   --            Results from last 7 days   Lab Units 10/23/23  1534   CLARITY UA  Clear   COLOR UA  Colorless   SPEC GRAV UA  1.021   PH UA  7.0   GLUCOSE UA mg/dl 200 (1/5%)*   KETONES UA mg/dl Trace*   BLOOD UA  Moderate*   PROTEIN UA mg/dl 70 (1+)*   NITRITE UA  Negative   BILIRUBIN UA  Negative   UROBILINOGEN UA (BE) mg/dl <2.0   LEUKOCYTES UA  Large*   WBC UA /hpf Innumerable*   RBC UA /hpf 1-2   BACTERIA UA /hpf Occasional   EPITHELIAL CELLS WET PREP /hpf None Seen     Results from last 7 days   Lab Units 10/23/23  2216   BLOOD CULTURE  Received in Microbiology Lab. Culture in Progress.          ED Treatment:   Medication Administration from 10/23/2023 1101 to 10/23/2023 2016         Date/Time Order Dose Route Action     10/23/2023 1239 EDT diphenhydrAMINE (BENADRYL) injection 50 mg 50 mg Intravenous Given     10/23/2023 1311 EDT multi-electrolyte (ISOLYTE-S PH 7.4) bolus 1,000 mL 1,000 mL Intravenous New Bag     10/23/2023 1302 EDT iohexol (OMNIPAQUE) 350 MG/ML injection (MULTI-DOSE) 100 mL 100 mL Intravenous Given     10/23/2023 1306 EDT HYDROmorphone (DILAUDID) injection 0.5 mg 0.5 mg Intravenous Given     10/23/2023 1534 EDT HYDROmorphone (DILAUDID) injection 0.5 mg 0.5 mg Intravenous Given     10/23/2023 1727 EDT pregabalin (LYRICA) capsule 75 mg 75 mg Oral Given     10/23/2023 1739 EDT atorvastatin (LIPITOR) tablet 80 mg 80 mg Oral Given     10/23/2023 1739 EDT sucralfate (CARAFATE) tablet 1 g 1 g Oral Given     10/23/2023 1739 EDT tamsulosin (FLOMAX) capsule 0.4 mg 0.4 mg Oral Given     10/23/2023 1722 EDT multi-electrolyte (PLASMALYTE-A/ISOLYTE-S PH 7.4) IV solution 100 mL/hr Intravenous New Bag     10/23/2023 1727 EDT heparin (porcine) subcutaneous injection 5,000 Units 5,000 Units Subcutaneous Given 10/23/2023 1727 EDT acetaminophen (TYLENOL) tablet 650 mg 650 mg Oral Given          Past Medical History:   Diagnosis Date    Cirrhosis ([address])     Dementia ([address])      Present on Admission:   Fall   Bladder cancer 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 ([address])   Insomnia   T2DM (type 2 diabetes mellitus) (HCC)   Elevated serum creatinine   UTI (urinary tract infection)   Dementia (HCC)   CKD (chronic kidney disease)   Thrombocytopenia (HCC)      Admitting Diagnosis: CKD (chronic kidney disease) [N18.9]  Elevated serum creatinine [R79.89]  Acute-on-chronic kidney injury  [N17.9, N18.9]  Fall, initial encounter [W19. XXXA]  Age/Sex: 74 y.o. adult    Admission Orders:  Scheduled Medications:  atorvastatin, 80 mg, Oral, Daily With Dinner  cefTRIAXone, 1,000 mg, Intravenous, Q24H  clopidogrel, 75 mg, Oral, Daily  heparin (porcine), 5,000 Units, Subcutaneous, Q8H [address]  insulin lispro, 1-5 Units, Subcutaneous, TID AC  nortriptyline, 10 mg, Oral, HS  oxybutynin, 5 mg, Oral, BID  pantoprazole, 40 mg, Oral, Daily  pregabalin, 75 mg, Oral, BID  sodium bicarbonate, 650 mg, Oral, BID after meals  sucralfate, 1 g, Oral, 4x Daily (AC & HS)  tacrolimus, 1 mg, Oral, Q12H TOÑO  tamsulosin, 0.4 mg, Oral, Daily With Dinner      Continuous IV Infusions:  multi-electrolyte, 100 mL/hr, Intravenous, Continuous      PRN Meds:  acetaminophen, 650 mg, Oral, Q6H PRN  meclizine, 25 mg, Oral, Q8H PRN  temazepam, 15 mg, Oral, HS PRN  zolpidem, 5 mg, Oral, HS PRN        IP CONSULT TO NEPHROLOGY    Network Utilization Review Department  ATTENTION: Please call with any questions or concerns to [phone] and carefully listen to the prompts so that you are directed to the right person. All voicemails are confidential.   For Discharge needs, contact Care Management DC Support Team at [phone] opt.  2  Send all requests for admission clinical reviews, approved or denied determinations and any other requests to dedicated fax number below belonging to the campus where the patient is receiving treatment.  List of dedicated fax numbers for the Facilities:  [facility] (Administrative/Medical Necessity) [phone]   DISCHARGE SUPPORT TEAM (NETWORK) [id number] (Maternity/NICU/Pediatrics) [phone]   [address] 1521 [facility] 1000 [facility] [phone] 1505 [facility] 207 [facility] 5220 [facility] [address] [address] [facility] [address] [phone]

## 2023-10-24 NOTE — PROGRESS NOTES
Progress Note - Trauma Tertiary [facility]   Ignacio Olivier 74 y.o. unknown [id number]   Unit/Bed#: GZ3 870-01 Encounter: [id number]     Assessment & Plan   Summary of Diagnosed Injuries: None    PLAN:  No traumatic injuries  Remainder of care per primary team    VTE Prophylaxis:Heparin     Disposition: continue med surg    Code status:  Level 3 - DNAR and DNI    Consultants: IP CONSULT TO NEPHROLOGY     Subjective   Transfer from: ED    Mechanism of Injury:Fall     Chief Complaint: Fall    HPI/Last 24 hour events: 73M PMH metastatic bladder cancer, liver transplant, CKD, CVA, presented after a fall. Trauma evaluation was negative for acute traumatic injuries and patient was admitted to medical service. Objective   Vitals:   Temp:  [99 °F (37.2 °C)-100 °F (37.8 °C)] 100 °F (37.8 °C)  HR:  [] 101  Resp:  [14-22] 22  BP: (104-140)/(55-77) 117/58    I/O       None             Physical Exam:   GENERAL APPEARANCE: No acute distress  NEURO: Alert and oriented, cranial nerves intact, motor strength and sensation intact and symmetric  HEENT: Normocephalic, atraumatic  CV: Regular rate and rhythm  LUNGS: Clear to auscultation bilaterally  GI: Nondistended, nontender to palpation  MSK: No midline C, T, or L-spine tenderness. No tenderness to palpation throughout extremities. SKIN: Warm and dry    Invasive Devices       Central Venous Catheter Line  Duration             Port A Cath Right -- days                       1. Before the illness or injury that brought you to the Emergency, did you need someone to help you on a regular basis? 0=No   2. Since the illness or injury that brought you to the Emergency, have you needed more help than usual to take care of yourself? 1=Yes   3. Have you been hospitalized for one or more nights during the past 6 months (excluding a stay in the Emergency Department)? 0=No   4. In general, do you see well? 0=Yes   5. In general, do you have serious problems with your memory? 0=No   6.  Do you take more than three different medications everyday? 1=Yes   TOTAL   2     Did you order a geriatric consult if the score was 2 or greater?: No, deferred to primary medicine team         Lab Results: Results: I have personally reviewed all pertinent laboratory/tests results    Imaging Results: I have personally reviewed pertinent reports.     Chest Xray(s): negative for acute findings   FAST exam(s): N/A   CT Scan(s): negative for acute findings   Additional Xray(s): negative for acute findings     Other Studies: N/A

## 2023-10-25 ENCOUNTER — APPOINTMENT (INPATIENT)
Dept: RADIOLOGY | Facility: HOSPITAL | Age: 73
DRG: 871 | End: 2023-10-25
Payer: COMMERCIAL

## 2023-10-25 PROBLEM — N17.9 ACUTE ON CHRONIC RENAL FAILURE: Status: ACTIVE | Noted: 2023-10-23

## 2023-10-25 PROBLEM — N18.9 ACUTE ON CHRONIC RENAL FAILURE: Status: ACTIVE | Noted: 2023-10-23

## 2023-10-25 LAB
ABO GROUP BLD: NORMAL
ANION GAP SERPL CALCULATED.3IONS-SCNC: 10 MMOL/L
BACTERIA UR CULT: ABNORMAL
BACTERIA UR CULT: ABNORMAL
BLD GP AB SCN SERPL QL: NEGATIVE
BUN SERPL-MCNC: 55 MG/DL (ref 5–25)
CALCIUM SERPL-MCNC: 7.2 MG/DL (ref 8.4–10.2)
CHLORIDE SERPL-SCNC: 104 MMOL/L (ref 96–108)
CK SERPL-CCNC: 470 U/L (ref 39–192)
CO2 SERPL-SCNC: 20 MMOL/L (ref 21–32)
CREAT SERPL-MCNC: 5.01 MG/DL (ref 0.6–1.3)
ERYTHROCYTE [DISTWIDTH] IN BLOOD BY AUTOMATED COUNT: 14.3 % (ref 11.6–15.1)
FERRITIN SERPL-MCNC: 324 NG/ML (ref 24–307)
FOLATE SERPL-MCNC: 14 NG/ML
GLUCOSE SERPL-MCNC: 185 MG/DL (ref 65–140)
GLUCOSE SERPL-MCNC: 190 MG/DL (ref 65–140)
GLUCOSE SERPL-MCNC: 201 MG/DL (ref 65–140)
GLUCOSE SERPL-MCNC: 288 MG/DL (ref 65–140)
HCT VFR BLD AUTO: 21 % (ref 36.5–46.1)
HGB BLD-MCNC: 6.9 G/DL (ref 12–15.4)
IRON SERPL-MCNC: <10 UG/DL (ref 50–212)
IRON SERPL-MCNC: <10 UG/DL (ref 50–212)
MCH RBC QN AUTO: 23.8 PG (ref 26.8–34.3)
MCHC RBC AUTO-ENTMCNC: 31.9 G/DL (ref 31.4–37.4)
MCV RBC AUTO: 75 FL (ref 82–98)
PLATELET # BLD AUTO: 74 THOUSANDS/UL (ref 149–390)
POTASSIUM SERPL-SCNC: 4.1 MMOL/L (ref 3.5–5.3)
RBC # BLD AUTO: 2.82 MILLION/UL (ref 3.88–5.12)
RH BLD: POSITIVE
SODIUM SERPL-SCNC: 134 MMOL/L (ref 135–147)
SPECIMEN EXPIRATION DATE: NORMAL
TIBC SERPL-MCNC: <125 UG/DL (ref 250–450)
TSH SERPL DL<=0.05 MIU/L-ACNC: 2.32 UIU/ML (ref 0.45–4.5)
UIBC SERPL-MCNC: 115 UG/DL (ref 155–355)
VIT B12 SERPL-MCNC: 389 PG/ML (ref 180–914)
WBC # BLD AUTO: 5.96 THOUSAND/UL (ref 4.31–10.16)

## 2023-10-25 PROCEDURE — 30233N1 TRANSFUSION OF NONAUTOLOGOUS RED BLOOD CELLS INTO PERIPHERAL VEIN, PERCUTANEOUS APPROACH: ICD-10-PCS | Performed by: STUDENT IN AN ORGANIZED HEALTH CARE EDUCATION/TRAINING PROGRAM

## 2023-10-25 PROCEDURE — 86900 BLOOD TYPING SEROLOGIC ABO: CPT | Performed by: INTERNAL MEDICINE

## 2023-10-25 PROCEDURE — 82550 ASSAY OF CK (CPK): CPT | Performed by: STUDENT IN AN ORGANIZED HEALTH CARE EDUCATION/TRAINING PROGRAM

## 2023-10-25 PROCEDURE — 84443 ASSAY THYROID STIM HORMONE: CPT | Performed by: INTERNAL MEDICINE

## 2023-10-25 PROCEDURE — 82746 ASSAY OF FOLIC ACID SERUM: CPT | Performed by: INTERNAL MEDICINE

## 2023-10-25 PROCEDURE — 83550 IRON BINDING TEST: CPT | Performed by: INTERNAL MEDICINE

## 2023-10-25 PROCEDURE — 99233 SBSQ HOSP IP/OBS HIGH 50: CPT | Performed by: INTERNAL MEDICINE

## 2023-10-25 PROCEDURE — P9058 RBC, L/R, CMV-NEG, IRRAD: HCPCS

## 2023-10-25 PROCEDURE — 82728 ASSAY OF FERRITIN: CPT | Performed by: INTERNAL MEDICINE

## 2023-10-25 PROCEDURE — 86850 RBC ANTIBODY SCREEN: CPT | Performed by: INTERNAL MEDICINE

## 2023-10-25 PROCEDURE — 86920 COMPATIBILITY TEST SPIN: CPT

## 2023-10-25 PROCEDURE — 80048 BASIC METABOLIC PNL TOTAL CA: CPT | Performed by: INTERNAL MEDICINE

## 2023-10-25 PROCEDURE — 83540 ASSAY OF IRON: CPT | Performed by: INTERNAL MEDICINE

## 2023-10-25 PROCEDURE — 82607 VITAMIN B-12: CPT | Performed by: INTERNAL MEDICINE

## 2023-10-25 PROCEDURE — 85027 COMPLETE CBC AUTOMATED: CPT | Performed by: INTERNAL MEDICINE

## 2023-10-25 PROCEDURE — 82948 REAGENT STRIP/BLOOD GLUCOSE: CPT

## 2023-10-25 PROCEDURE — 99233 SBSQ HOSP IP/OBS HIGH 50: CPT | Performed by: STUDENT IN AN ORGANIZED HEALTH CARE EDUCATION/TRAINING PROGRAM

## 2023-10-25 PROCEDURE — 86901 BLOOD TYPING SEROLOGIC RH(D): CPT | Performed by: INTERNAL MEDICINE

## 2023-10-25 PROCEDURE — 71045 X-RAY EXAM CHEST 1 VIEW: CPT

## 2023-10-25 RX ORDER — CALCIUM CARBONATE 500 MG/1
500 TABLET, CHEWABLE ORAL ONCE
Status: COMPLETED | OUTPATIENT
Start: 2023-10-25 | End: 2023-10-25

## 2023-10-25 RX ORDER — GUAIFENESIN 100 MG/5ML
200 SOLUTION ORAL ONCE
Status: COMPLETED | OUTPATIENT
Start: 2023-10-25 | End: 2023-10-25

## 2023-10-25 RX ADMIN — HEPARIN SODIUM 5000 UNITS: 5000 INJECTION INTRAVENOUS; SUBCUTANEOUS at 05:59

## 2023-10-25 RX ADMIN — ZOLPIDEM TARTRATE 5 MG: 5 TABLET ORAL at 22:02

## 2023-10-25 RX ADMIN — OXYBUTYNIN CHLORIDE 5 MG: 5 TABLET ORAL at 08:06

## 2023-10-25 RX ADMIN — SUCRALFATE 1 G: 1 TABLET ORAL at 17:37

## 2023-10-25 RX ADMIN — PREGABALIN 75 MG: 50 CAPSULE ORAL at 08:06

## 2023-10-25 RX ADMIN — PREGABALIN 75 MG: 50 CAPSULE ORAL at 17:37

## 2023-10-25 RX ADMIN — TAMSULOSIN HYDROCHLORIDE 0.4 MG: 0.4 CAPSULE ORAL at 17:37

## 2023-10-25 RX ADMIN — SODIUM BICARBONATE 650 MG TABLET 650 MG: at 17:37

## 2023-10-25 RX ADMIN — TACROLIMUS 1 MG: 1 CAPSULE ORAL at 22:02

## 2023-10-25 RX ADMIN — HEPARIN SODIUM 5000 UNITS: 5000 INJECTION INTRAVENOUS; SUBCUTANEOUS at 22:02

## 2023-10-25 RX ADMIN — INSULIN LISPRO 1 UNITS: 100 INJECTION, SOLUTION INTRAVENOUS; SUBCUTANEOUS at 11:59

## 2023-10-25 RX ADMIN — GUAIFENESIN 200 MG: 200 SOLUTION ORAL at 22:38

## 2023-10-25 RX ADMIN — PANTOPRAZOLE SODIUM 40 MG: 40 TABLET, DELAYED RELEASE ORAL at 08:06

## 2023-10-25 RX ADMIN — TEMAZEPAM 15 MG: 15 CAPSULE ORAL at 22:02

## 2023-10-25 RX ADMIN — ACETAMINOPHEN 650 MG: 325 TABLET, FILM COATED ORAL at 22:33

## 2023-10-25 RX ADMIN — SODIUM BICARBONATE 650 MG TABLET 650 MG: at 08:06

## 2023-10-25 RX ADMIN — OXYBUTYNIN CHLORIDE 5 MG: 5 TABLET ORAL at 17:37

## 2023-10-25 RX ADMIN — INSULIN LISPRO 1 UNITS: 100 INJECTION, SOLUTION INTRAVENOUS; SUBCUTANEOUS at 08:07

## 2023-10-25 RX ADMIN — TACROLIMUS 1 MG: 1 CAPSULE ORAL at 08:06

## 2023-10-25 RX ADMIN — ATORVASTATIN CALCIUM 80 MG: 80 TABLET, FILM COATED ORAL at 17:37

## 2023-10-25 RX ADMIN — INSULIN LISPRO 2 UNITS: 100 INJECTION, SOLUTION INTRAVENOUS; SUBCUTANEOUS at 17:37

## 2023-10-25 RX ADMIN — CEFTRIAXONE SODIUM 1000 MG: 10 INJECTION, POWDER, FOR SOLUTION INTRAVENOUS at 20:45

## 2023-10-25 RX ADMIN — CALCIUM CARBONATE (ANTACID) CHEW TAB 500 MG 500 MG: 500 CHEW TAB at 22:38

## 2023-10-25 RX ADMIN — SUCRALFATE 1 G: 1 TABLET ORAL at 07:24

## 2023-10-25 RX ADMIN — NORTRIPTYLINE HYDROCHLORIDE 10 MG: 10 CAPSULE ORAL at 22:02

## 2023-10-25 RX ADMIN — SUCRALFATE 1 G: 1 TABLET ORAL at 11:59

## 2023-10-25 RX ADMIN — CLOPIDOGREL BISULFATE 75 MG: 75 TABLET ORAL at 08:06

## 2023-10-25 RX ADMIN — HEPARIN SODIUM 5000 UNITS: 5000 INJECTION INTRAVENOUS; SUBCUTANEOUS at 14:36

## 2023-10-25 RX ADMIN — SUCRALFATE 1 G: 1 TABLET ORAL at 22:02

## 2023-10-25 NOTE — PROGRESS NOTES
NEPHROLOGY PROGRESS NOTE   Kim Gardiner 74 y.o. adult MRN: [id number]  Unit/Bed#: LT0 870-01 Encounter: [id number]  Reason for Consult: TAVON    ASSESSMENT AND PLAN:  74 y.o. 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 Admitted after a fall . Nephrology is consulted for management of TAVON      PLAN     #Non-Oliguric KDIGO TAVON stage 1 on CKD G4 versus progression of CKD  Etiology: Likely secondary to ATN in the settings of recurrent TAVON with progression of CKD. Worsening kidney function in the settings of contrast associated nephropathy   Baseline creatinine: 3.6 mg/dL in August  Current creatinine: 5.01 mg/dL, trending up  Peak creatinine: Trending  UA: No hematuria, leukocyturia  Renal imaging :   Right renal atrophy with hydronephrosis, right double-J stent. urothelial thickening. No left hydronephrosis  Treatment:  No urgent indication of dialysis at this time. I have a discussion regarding dialysis with patient. See below  No uremic symptoms  Maintain MAP:  Over 65 mmHg if possible/avoid hypoperfusion:  Hold parameters on blood pressure medications  Avoid nephrotoxic agents such as NSAIDs, and IV contrast if possible. Avoid opioids   Adjust medications to GFR     #CKD G4/5  Baseline creatinine: 3.6 mg/dL in August  Etiology: Likely secondary to ATN in the settings of recurrent TAVON with component of obstructive uropathy and nephron loss with atrophic kidney    Dialysis discussion with patient on 10/25: I informed patient of worsening kidney function. We talked about dialysis benefits and risk. He had a brief conversation about dialysis with primary nephrologist.  Patient is not sure if he wants to proceed with dialysis and wants to talk with his family about it.   No urgent indication of dialysis today        #Acid-base Disorder  serum HCO3 20 mmol/L  Metabolic acidosis start sodium bicarbonate 650 twice daily     #Volume status/hypertension:  Volume: Euvolemic  Blood pressure: Tendency to hypotension /63 mmhg   <140/90  Recommend:  Low-sodium diet   enforce fluid intake  Decrease Plasma-Lyte to 75 mL/h  Monitor urinary output     #Anemia:  Current hemoglobin: 6.9mg/dL  Treatment:  Transfuse for hemoglobin less than 7.0 per primary service       #Fall  Dizziness, on meclizine   PT OT        #Liver transplant   on tacrolimus twice daily  Monitor levels tomorrow morning before dose    SUBJECTIVE:  Patient seen and examined at bedside. No chest pain, shortness of breath, nausea, vomiting, abdominal pain or diarrhea.      OBJECTIVE:  Current Weight: Weight - Scale: 56.7 kg (125 lb)  Vitals:    10/25/23 1140   BP: 110/63   Pulse: 98   Resp: 20   Temp: 99.1 °F (37.3 °C)   SpO2:        Intake/Output Summary (Last 24 hours) at 10/25/2023 1222  Last data filed at 10/25/2023 0544  Gross per 24 hour   Intake --   Output 400 ml   Net -400 ml     Wt Readings from Last 3 Encounters:   10/25/23 56.7 kg (125 lb)     Temp Readings from Last 3 Encounters:   10/25/23 99.1 °F (37.3 °C) (Oral)     BP Readings from Last 3 Encounters:   10/25/23 110/63     Pulse Readings from Last 3 Encounters:   10/25/23 98        General:  no acute distress at this time  Skin:  No acute rash  Eyes:  No scleral icterus and noninjected  ENT:  mucous membranes moist  Neck:  no carotid bruits  Chest:  Clear to auscultation percussion, good respiratory effort, no use of accessory respiratory muscles  CVS:  Regular rate and rhythm without rub   Abdomen:  soft and nontender   Extremities: no significant lower extremity edema  Neuro:  No gross focality  Psych:  Alert , cooperative       Medications:    Current Facility-Administered Medications:     acetaminophen (TYLENOL) tablet 650 mg, 650 mg, Oral, Q6H PRN, Cele Verdugo DO, 650 mg at 10/24/23 9829    atorvastatin (LIPITOR) tablet 80 mg, 80 mg, Oral, Daily With Baby Ranjit, DO, 80 mg at 10/24/23 1716    cefTRIAXone (ROCEPHIN) 1,000 mg in dextrose 5 % 50 mL IVPB, 1,000 mg, Intravenous, Q24H, Cele Verdugo, DO, Last Rate: 100 mL/hr at 10/24/23 2145, 1,000 mg at 10/24/23 2145    clopidogrel (PLAVIX) tablet 75 mg, 75 mg, Oral, Daily, Cele Verdugo, DO, 75 mg at 10/25/23 0806    heparin (porcine) subcutaneous injection 5,000 Units, 5,000 Units, Subcutaneous, Q8H [address], Cele Verdugo, DO, 5,000 Units at 10/25/23 0559    insulin lispro (HumaLOG) 100 units/mL subcutaneous injection 1-5 Units, 1-5 Units, Subcutaneous, TID AC, 1 Units at 10/25/23 1159 **AND** Fingerstick Glucose (POCT), , , TID AC, Cele De La Fuenteel, DO    meclizine (ANTIVERT) tablet 25 mg, 25 mg, Oral, Q8H PRN, Cele De La Fuenteel, DO    multi-electrolyte (PLASMALYTE-A/ISOLYTE-S PH 7.4) IV solution, 75 mL/hr, Intravenous, Continuous, Moncho Rodriguez MD, Last Rate: 75 mL/hr at 10/25/23 0807, 75 mL/hr at 10/25/23 0807    nortriptyline (PAMELOR) capsule 10 mg, 10 mg, Oral, HS, Cele Verdugo, DO, 10 mg at 10/24/23 2143    oxybutynin (DITROPAN) tablet 5 mg, 5 mg, Oral, BID, Cele Verdugo, DO, 5 mg at 10/25/23 0806    pantoprazole (PROTONIX) EC tablet 40 mg, 40 mg, Oral, Daily, Cele Verdugo, DO, 40 mg at 10/25/23 0806    pregabalin (LYRICA) capsule 75 mg, 75 mg, Oral, BID, eCle Verdugo, DO, 75 mg at 10/25/23 0806    sodium bicarbonate tablet 650 mg, 650 mg, Oral, BID after meals, Moncho Rodriguez MD, 650 mg at 10/25/23 0806    sucralfate (CARAFATE) tablet 1 g, 1 g, Oral, 4x Daily (AC & HS), Cele Verdugo DO, 1 g at 10/25/23 1159    tacrolimus (PROGRAF) capsule 1 mg, 1 mg, Oral, Q12H [address], Cele Verdugo DO, 1 mg at 10/25/23 0806    tamsulosin (FLOMAX) capsule 0.4 mg, 0.4 mg, Oral, Daily With Dinner, [employer], DO, 0.4 mg at 10/24/23 1716    temazepam (RESTORIL) capsule 15 mg, 15 mg, Oral, HS PRN, [place] Verdugo DO, 15 mg at 10/24/23 2142    zolpidem (AMBIEN) tablet 5 mg, 5 mg, Oral, HS PRN, Cele Verdugo , 5 mg at 10/24/23 2142    Laboratory Results:  Results from last 7 days   Lab Units 10/25/23  0554 10/24/23  0534 10/23/23  1133   WBC Thousand/uL 5.96 6.24 8.06   HEMOGLOBIN g/dL 6.9* 7.5* 8.0*   HEMATOCRIT % 21.0* 23.9* 25.7*   PLATELETS Thousands/uL 74* 72* 68*   SODIUM mmol/L 134* 134* 135   POTASSIUM mmol/L 4.1 4.4 4.5   CHLORIDE mmol/L 104 104 108   CO2 mmol/L 20* 20* 16*   BUN mg/dL 55* 52* 56*   CREATININE mg/dL 5.01* 4.20* 4.25*   CALCIUM mg/dL 7.2* 7.2* 7.7*       CT abdomen pelvis with contrast   Final Result by Chapin Andrews MD (10/23 1359)      1. Redemonstration of right renal atrophy and moderate right hydronephrosis with a double-J right ureteral stent. There is mild diffuse right urothelial thickening and enhancement. There is also mild diffuse thickening of the urinary bladder and adjacent    fat stranding. This may be explained by an infectious etiology including right ureteritis and cystitis. 2. Skin thickening and/or subcutaneous edema in the periumbilical area may be due to history of trauma. Workstation performed: [id number]         CT head without contrast   Final Result by Janeth Jay DO (10/23 1300)   Addendum (preliminary) 1 of 1 by Janeth Jay DO (10/23 1300)   ADDENDUM:   Prior from July 31, 2023 performed under separate medical record number. Prior left-sided aneurysm clipping and local encephalomalacia is    unchanged. I personally discussed the CT brain and C-spine with Clarence    on 10/23/2023 1:00 PM.            Final   No acute intracranial abnormality. Workstation performed: [id number]         CT spine cervical without contrast   Final Result by Janeth Jay DO (10/23 3575)   No cervical spine fracture or traumatic malalignment.                   Workstation performed: [id number] [id number] multiple   Final Result by Bina Pineda MD (10/23 1643)      Spiculated mass in the right upper lobe, little changed since 8/1/2023. Interval increase in size of bilateral pulmonary metastases. No evidence of acute traumatic abnormality in the chest.      The study was marked in [facility] for immediate notification. Workstation performed: [id number]         XR chest portable   Final Result by Bina Pineda MD (10/24 3346)      Spiculated mass in the right upper lobe, little changed since 8/1/2023. Interval increase in size of bilateral pulmonary metastases. No evidence of acute traumatic abnormality in the chest.      The study was marked in [facility] for immediate notification. Workstation performed: [id number]             Portions of the record may have been created with voice recognition software. Occasional wrong word or "sound a like" substitutions may have occurred due to the inherent limitations of voice recognition software. Read the chart carefully and recognize, using context, where substitutions have occurred.

## 2023-10-25 NOTE — PROGRESS NOTES
[address] [facility]  Progress Note  Name: Marleni Scott  MRN: [id number]  Unit/Bed#: WB2 870-01 I Date of Admission: 10/23/2023   Date of Service: 10/25/2023 I Hospital Day: 2    Assessment/Plan   * Fall  Assessment & Plan  Past medical history of vertigo, dementia and cirrhosis presenting after multiple falls over last 2 days. After discussion with family, patient has been dizzy for several weeks with poor oral intake along with complaints of urinary frequency and dysuria. Meclizine as needed  Obtain orthostatic vitals  Work-up revealed UTI  PT/OT    UTI (urinary tract infection)  Assessment & Plan  History of UTIs  Patient denies any dysuria however family reports that he has been complaining of dysuria for the last few days with mild confusion  UA with signs of infection  CT A/P with right renal atrophy, moderate right hydronephrosis with right ureteral stent. There is mild diffuse right urothelial thickening and enhancement. There is also mild diffuse thickening of the urinary bladder and adjacent fat stranding. This may be explained by infectious etiology including right ureteritis and cystitis. Prior CT scan reviewed this similar ureteritis findings however cystitis appears to be new  Bcx negative for growth to date  Uine culture is positive for alpha step and MSSA, continue ceftriaxone  Per urology no indication for stent change  Monitor temps, WBCs    Acute on chronic renal failure   Assessment & Plan  History of CKD with baseline creatinine of 3.3-3.6  Likely TAVON on CKD due to poor po intake and possible contrast nephropathy  Presenting with creatinine of 4.2  Not meeting criteria for TAVON though likely elevated secondary to underlying CKD and poor oral intake  Received IV contrast for trauma scans  IVF hydration  Monitor creatinine daily  Avoid nephrotoxic agents and relative hypotension  Nephrology following, no indication to do HD at this time.     Anemia  Assessment & Plan  Chronic anemia likely due to CKD and multiple blood draws  Likely worsened due to acute infection  Check anemia studies and transfuse 1 unit of PBRCS today  Blood consent obtained from his daughter    History of CVA (cerebrovascular accident)  Assessment & Plan  Remote history of CVA  Continue Plavix    Thrombocytopenia ([address])  Assessment & Plan  Lab Results   Component Value Date    PLT 74 (L) 10/25/2023     Chronic at baseline    CKD (chronic kidney disease)  Assessment & Plan  Lab Results   Component Value Date    CREATININE 5.01 (H) 10/25/2023    CREATININE 4.20 (H) 10/24/2023    CREATININE 4.25 (H) 10/23/2023   Plan as above    Dementia ([address])  Assessment & Plan  A&O x4  Outpatient follow-up  Delirium precautions    T2DM (type 2 diabetes mellitus) ([address])  Assessment & Plan  No results found for: "HGBA1C"    Recent Labs     10/24/23  1116 10/24/23  1618 10/25/23  0543 10/25/23  1114   POCGLU 212* 272* 201* 190*       Blood Sugar Average: Last 72 hrs:  (P) 214.6  Not on any oral agents  SSI while inpatient  Monitor glucose closely    History of liver transplant ([address])  Assessment & Plan  History of liver transplant secondary to alcoholic cirrhosis  Continue tacrolimus 1 mg every 12 hours  Follow-up with GI outpatient    Insomnia  Assessment & Plan  Continue temazepam and Ambien which patient has been on for 30 years  Confirmed with PDMP    Bladder cancer 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 [facility])  Assessment & Plan  History of stage IV bladder tumor status post resection and radiation with metastasis to the lungs  Not a candidate for further therapy cancer directed treatment  No pain currently  Supportive care  Continue nortriptyline and Lyrica, PDMP reviewed  Follows up with palliative care outpatient               VTE Pharmacologic Prophylaxis: VTE Score: 6 Moderate Risk (Score 3-4) - Pharmacological DVT Prophylaxis Ordered: heparin.     Patient Centered Rounds: I performed bedside rounds with nursing staff today.  Discussions with Specialists or Other Care Team Provider: nurseANNE    Education and Discussions with Family / Patient: Updated  (significant other) via phone. Total Time Spent on Date of Encounter in care of patient: 45 mins. This time was spent on one or more of the following: performing physical exam; counseling and coordination of care; obtaining or reviewing history; documenting in the medical record; reviewing/ordering tests, medications or procedures; communicating with other healthcare professionals and discussing with patient's family/caregivers. Current Length of Stay: 2 day(s)  Current Patient Status: Inpatient   Certification Statement: The patient will continue to require additional inpatient hospital stay due to transufsion, infection treatment  Discharge Plan: Anticipate discharge in 24-48 hrs to home with home services. Code Status: Level 3 - DNAR and DNI    Subjective:   Denies any new complaints. Reports that he did well overnight, denies dysuria now. Per chart a fever last night    Objective:     Vitals:   Temp (24hrs), Av.3 °F (37.4 °C), Min:97.9 °F (36.6 °C), Max:101.7 °F (38.7 °C)    Temp:  [97.9 °F (36.6 °C)-101.7 °F (38.7 °C)] 98.9 °F (37.2 °C)  HR:  [80-98] 90  Resp:  [16-22] 18  BP: ()/(53-65) 118/65  SpO2:  [97 %-100 %] 100 %  Body mass index is 22.14 kg/m². Input and Output Summary (last 24 hours): Intake/Output Summary (Last 24 hours) at 10/25/2023 1459  Last data filed at 10/25/2023 1440  Gross per 24 hour   Intake 325 ml   Output 400 ml   Net -75 ml       Physical Exam:   Physical Exam  Constitutional:       Appearance: Normal appearance. HENT:      Head: Normocephalic and atraumatic. Nose: Nose normal.   Eyes:      Extraocular Movements: Extraocular movements intact. Cardiovascular:      Rate and Rhythm: Normal rate and regular rhythm. Pulmonary:      Effort: Pulmonary effort is normal.      Breath sounds:  No wheezing or rales.   Abdominal:      General: There is no distension. Palpations: Abdomen is soft. Tenderness: There is no abdominal tenderness. Musculoskeletal:      Right lower leg: No edema. Left lower leg: No edema. Neurological:      Mental Status: Cheikh Thomas is alert. Mental status is at baseline. Psychiatric:         Mood and Affect: Mood normal.         Behavior: Behavior normal.          Additional Data:     Labs:  Results from last 7 days   Lab Units 10/25/23  0554 10/24/23  0534   WBC Thousand/uL 5.96 6.24   HEMOGLOBIN g/dL 6.9* 7.5*   HEMATOCRIT % 21.0* 23.9*   PLATELETS Thousands/uL 74* 72*   NEUTROS PCT %  --  81*   LYMPHS PCT %  --  9*   MONOS PCT %  --  8   EOS PCT %  --  1     Results from last 7 days   Lab Units 10/25/23  0554 10/24/23  0534 10/23/23  1133   SODIUM mmol/L 134*   < > 135   POTASSIUM mmol/L 4.1   < > 4.5   CHLORIDE mmol/L 104   < > 108   CO2 mmol/L 20*   < > 16*   BUN mg/dL 55*   < > 56*   CREATININE mg/dL 5.01*   < > 4.25*   ANION GAP mmol/L 10   < > 11   CALCIUM mg/dL 7.2*   < > 7.7*   ALBUMIN g/dL  --   --  3.3*   TOTAL BILIRUBIN mg/dL  --   --  0.44   ALK PHOS U/L  --   --  59   ALT U/L  --   --  16   AST U/L  --   --  37   GLUCOSE RANDOM mg/dL 185*   < > 188*    < > = values in this interval not displayed. Results from last 7 days   Lab Units 10/25/23  1114 10/25/23  0543 10/24/23  1618 10/24/23  1116 10/24/23  0757   POC GLUCOSE mg/dl 190* 201* 272* 212* 198*               Lines/Drains:  Invasive Devices       Central Venous Catheter Line  Duration             Port A Cath Right -- days                    Central Line:  Goal for removal: Port accessed. Will de-access as appropriate. Imaging: No pertinent imaging reviewed.     Recent Cultures (last 7 days):   Results from last 7 days   Lab Units 10/23/23  2216 10/23/23  1534   BLOOD CULTURE  No Growth at 24 hrs.  --    URINE CULTURE   --  60,000-69,000 cfu/ml Staphylococcus aureus*  30,000-39,000 cfu/ml Alpha Hemolytic Streptococcus NOT Enterococcus*       Last 24 Hours Medication List:   Current Facility-Administered Medications   Medication Dose Route Frequency Provider Last Rate    acetaminophen  650 mg Oral Q6H [facility]N [facility]      atorvastatin  80 mg Oral Daily With [employer], DO      cefTRIAXone  1,000 mg Intravenous Q24H [facility] 1,000 mg (10/24/23 4125)    clopidogrel  75 mg Oral Daily [facility]      heparin (porcine)  5,000 Units Subcutaneous Q8H [facility], DO      insulin lispro  1-5 Units Subcutaneous TID AC [facility]      meclizine  25 mg Oral Q8H PRN Angel Phalen, DO      multi-electrolyte  75 mL/hr Intravenous Continuous Eriberto Mahmood MD 75 mL/hr (10/25/23 2762)    nortriptyline  10 mg Oral HS [facility], DO      oxybutynin  5 mg Oral BID [facility], DO      pantoprazole  40 mg Oral Daily [facility], DO      pregabalin  75 mg Oral BID [facility], DO      sodium bicarbonate  650 mg Oral BID after meals Eriberto Mahmood MD      sucralfate  1 g Oral 4x Daily (AC & HS) [facility], DO      tacrolimus  1 mg Oral Q12H [facility], DO      tamsulosin  0.4 mg Oral Daily With [facility], DO      temazepam  15 mg Oral HS PRN [facility]      zolpidem  5 mg Oral HS PRN Choco Daten, DO          Today, Patient Was Seen By: Niecy Rashid MD    **Please Note: This note may have been constructed using a voice recognition system. **

## 2023-10-26 ENCOUNTER — APPOINTMENT (INPATIENT)
Dept: RADIOLOGY | Facility: HOSPITAL | Age: 73
DRG: 871 | End: 2023-10-26
Payer: COMMERCIAL

## 2023-10-26 ENCOUNTER — APPOINTMENT (INPATIENT)
Dept: NON INVASIVE DIAGNOSTICS | Facility: HOSPITAL | Age: 73
DRG: 871 | End: 2023-10-26
Payer: COMMERCIAL

## 2023-10-26 PROBLEM — R78.81 MSSA BACTEREMIA: Status: ACTIVE | Noted: 2023-10-26

## 2023-10-26 PROBLEM — B95.61 MSSA BACTEREMIA: Status: ACTIVE | Noted: 2023-10-26

## 2023-10-26 LAB
ABO GROUP BLD BPU: NORMAL
ANION GAP SERPL CALCULATED.3IONS-SCNC: 10 MMOL/L
AORTIC ROOT: 3.3 CM
APICAL FOUR CHAMBER EJECTION FRACTION: 52 %
ASCENDING AORTA: 3.5 CM
BASOPHILS # BLD AUTO: 0 THOUSANDS/ÂΜL (ref 0–0.1)
BASOPHILS # BLD AUTO: 0.01 THOUSANDS/ÂΜL (ref 0–0.1)
BASOPHILS NFR BLD AUTO: 0 % (ref 0–1)
BASOPHILS NFR BLD AUTO: 0 % (ref 0–1)
BPU ID: NORMAL
BUN SERPL-MCNC: 50 MG/DL (ref 5–25)
CALCIUM SERPL-MCNC: 7 MG/DL (ref 8.4–10.2)
CHLORIDE SERPL-SCNC: 104 MMOL/L (ref 96–108)
CO2 SERPL-SCNC: 20 MMOL/L (ref 21–32)
CREAT SERPL-MCNC: 4.85 MG/DL (ref 0.6–1.3)
CROSSMATCH: NORMAL
E WAVE DECELERATION TIME: 235 MS
E/A RATIO: 0.75
EOSINOPHIL # BLD AUTO: 0.05 THOUSAND/ÂΜL (ref 0–0.61)
EOSINOPHIL # BLD AUTO: 0.09 THOUSAND/ÂΜL (ref 0–0.61)
EOSINOPHIL NFR BLD AUTO: 1 % (ref 0–6)
EOSINOPHIL NFR BLD AUTO: 2 % (ref 0–6)
ERYTHROCYTE [DISTWIDTH] IN BLOOD BY AUTOMATED COUNT: 14.1 % (ref 11.6–15.1)
ERYTHROCYTE [DISTWIDTH] IN BLOOD BY AUTOMATED COUNT: 14.3 % (ref 11.6–15.1)
FRACTIONAL SHORTENING: 28 (ref 28–44)
GLUCOSE SERPL-MCNC: 215 MG/DL (ref 65–140)
GLUCOSE SERPL-MCNC: 245 MG/DL (ref 65–140)
GLUCOSE SERPL-MCNC: 253 MG/DL (ref 65–140)
GLUCOSE SERPL-MCNC: 274 MG/DL (ref 65–140)
GLUCOSE SERPL-MCNC: 298 MG/DL (ref 65–140)
HCT VFR BLD AUTO: 24 % (ref 36.5–46.1)
HCT VFR BLD AUTO: 26 % (ref 36.5–46.1)
HGB BLD-MCNC: 8.1 G/DL (ref 12–15.4)
HGB BLD-MCNC: 8.5 G/DL (ref 12–15.4)
IMM GRANULOCYTES # BLD AUTO: 0.03 THOUSAND/UL (ref 0–0.2)
IMM GRANULOCYTES # BLD AUTO: 0.04 THOUSAND/UL (ref 0–0.2)
IMM GRANULOCYTES NFR BLD AUTO: 1 % (ref 0–2)
IMM GRANULOCYTES NFR BLD AUTO: 1 % (ref 0–2)
INTERVENTRICULAR SEPTUM IN DIASTOLE (PARASTERNAL SHORT AXIS VIEW): 0.6 CM
INTERVENTRICULAR SEPTUM: 0.6 CM (ref 0.6–1.1)
LAAS-AP2: 15.3 CM2
LAAS-AP4: 16.4 CM2
LEFT ATRIUM SIZE: 3.4 CM
LEFT ATRIUM VOLUME (MOD BIPLANE): 39 ML
LEFT ATRIUM VOLUME INDEX (MOD BIPLANE): 24.7 ML/M2
LEFT INTERNAL DIMENSION IN SYSTOLE: 2.8 CM (ref 2.1–4)
LEFT VENTRICULAR INTERNAL DIMENSION IN DIASTOLE: 3.9 CM (ref 3.5–6)
LEFT VENTRICULAR POSTERIOR WALL IN END DIASTOLE: 0.7 CM
LEFT VENTRICULAR STROKE VOLUME: 34 ML
LVSV (TEICH): 34 ML
LYMPHOCYTES # BLD AUTO: 0.58 THOUSANDS/ÂΜL (ref 0.6–4.47)
LYMPHOCYTES # BLD AUTO: 0.58 THOUSANDS/ÂΜL (ref 0.6–4.47)
LYMPHOCYTES NFR BLD AUTO: 10 % (ref 14–44)
LYMPHOCYTES NFR BLD AUTO: 11 % (ref 14–44)
MCH RBC QN AUTO: 24.9 PG (ref 26.8–34.3)
MCH RBC QN AUTO: 25.3 PG (ref 26.8–34.3)
MCHC RBC AUTO-ENTMCNC: 32.7 G/DL (ref 31.4–37.4)
MCHC RBC AUTO-ENTMCNC: 33.8 G/DL (ref 31.4–37.4)
MCV RBC AUTO: 75 FL (ref 82–98)
MCV RBC AUTO: 76 FL (ref 82–98)
MONOCYTES # BLD AUTO: 0.48 THOUSAND/ÂΜL (ref 0.17–1.22)
MONOCYTES # BLD AUTO: 0.49 THOUSAND/ÂΜL (ref 0.17–1.22)
MONOCYTES NFR BLD AUTO: 8 % (ref 4–12)
MONOCYTES NFR BLD AUTO: 9 % (ref 4–12)
MV E'TISSUE VEL-SEP: 7 CM/S
MV PEAK A VEL: 1.06 M/S
MV PEAK E VEL: 80 CM/S
MV STENOSIS PRESSURE HALF TIME: 68 MS
MV VALVE AREA P 1/2 METHOD: 3.24
NEUTROPHILS # BLD AUTO: 4.34 THOUSANDS/ÂΜL (ref 1.85–7.62)
NEUTROPHILS # BLD AUTO: 4.93 THOUSANDS/ÂΜL (ref 1.85–7.62)
NEUTS SEG NFR BLD AUTO: 77 % (ref 43–75)
NEUTS SEG NFR BLD AUTO: 80 % (ref 43–75)
NRBC BLD AUTO-RTO: 0 /100 WBCS
NRBC BLD AUTO-RTO: 0 /100 WBCS
PLATELET # BLD AUTO: 75 THOUSANDS/UL (ref 149–390)
PLATELET # BLD AUTO: 82 THOUSANDS/UL (ref 149–390)
PMV BLD AUTO: 12.1 FL (ref 8.9–12.7)
PMV BLD AUTO: 12.2 FL (ref 8.9–12.7)
POTASSIUM SERPL-SCNC: 3.9 MMOL/L (ref 3.5–5.3)
PROCALCITONIN SERPL-MCNC: 4.11 NG/ML
RBC # BLD AUTO: 3.2 MILLION/UL (ref 3.88–5.12)
RBC # BLD AUTO: 3.42 MILLION/UL (ref 3.88–5.12)
RIGHT ATRIUM AREA SYSTOLE A4C: 10.6 CM2
RIGHT VENTRICLE ID DIMENSION: 2.4 CM
SL CV LEFT ATRIUM LENGTH A2C: 5.1 CM
SL CV LV EF: 55
SL CV PED ECHO LEFT VENTRICLE DIASTOLIC VOLUME (MOD BIPLANE) 2D: 65 ML
SL CV PED ECHO LEFT VENTRICLE SYSTOLIC VOLUME (MOD BIPLANE) 2D: 30 ML
SODIUM SERPL-SCNC: 134 MMOL/L (ref 135–147)
TRICUSPID ANNULAR PLANE SYSTOLIC EXCURSION: 1.6 CM
UNIT DISPENSE STATUS: NORMAL
UNIT PRODUCT CODE: NORMAL
UNIT PRODUCT VOLUME: 350 ML
UNIT RH: NORMAL
WBC # BLD AUTO: 5.54 THOUSAND/UL (ref 4.31–10.16)
WBC # BLD AUTO: 6.08 THOUSAND/UL (ref 4.31–10.16)

## 2023-10-26 PROCEDURE — 82948 REAGENT STRIP/BLOOD GLUCOSE: CPT

## 2023-10-26 PROCEDURE — 80197 ASSAY OF TACROLIMUS: CPT | Performed by: STUDENT IN AN ORGANIZED HEALTH CARE EDUCATION/TRAINING PROGRAM

## 2023-10-26 PROCEDURE — 99232 SBSQ HOSP IP/OBS MODERATE 35: CPT | Performed by: STUDENT IN AN ORGANIZED HEALTH CARE EDUCATION/TRAINING PROGRAM

## 2023-10-26 PROCEDURE — 80048 BASIC METABOLIC PNL TOTAL CA: CPT | Performed by: INTERNAL MEDICINE

## 2023-10-26 PROCEDURE — 85025 COMPLETE CBC W/AUTO DIFF WBC: CPT

## 2023-10-26 PROCEDURE — 97116 GAIT TRAINING THERAPY: CPT

## 2023-10-26 PROCEDURE — 85025 COMPLETE CBC W/AUTO DIFF WBC: CPT | Performed by: NURSE PRACTITIONER

## 2023-10-26 PROCEDURE — 97112 NEUROMUSCULAR REEDUCATION: CPT

## 2023-10-26 PROCEDURE — 97530 THERAPEUTIC ACTIVITIES: CPT

## 2023-10-26 PROCEDURE — 87040 BLOOD CULTURE FOR BACTERIA: CPT

## 2023-10-26 PROCEDURE — 71046 X-RAY EXAM CHEST 2 VIEWS: CPT

## 2023-10-26 PROCEDURE — 84145 PROCALCITONIN (PCT): CPT

## 2023-10-26 PROCEDURE — 93306 TTE W/DOPPLER COMPLETE: CPT

## 2023-10-26 PROCEDURE — 99223 1ST HOSP IP/OBS HIGH 75: CPT | Performed by: STUDENT IN AN ORGANIZED HEALTH CARE EDUCATION/TRAINING PROGRAM

## 2023-10-26 PROCEDURE — 97535 SELF CARE MNGMENT TRAINING: CPT

## 2023-10-26 PROCEDURE — 99233 SBSQ HOSP IP/OBS HIGH 50: CPT | Performed by: INTERNAL MEDICINE

## 2023-10-26 RX ORDER — CEFAZOLIN SODIUM 1 G/50ML
1000 SOLUTION INTRAVENOUS EVERY 24 HOURS
Status: DISCONTINUED | OUTPATIENT
Start: 2023-10-26 | End: 2023-10-26

## 2023-10-26 RX ORDER — DOCUSATE SODIUM 100 MG/1
100 CAPSULE, LIQUID FILLED ORAL 2 TIMES DAILY
Status: DISCONTINUED | OUTPATIENT
Start: 2023-10-26 | End: 2023-11-03 | Stop reason: HOSPADM

## 2023-10-26 RX ORDER — SENNOSIDES 8.6 MG
2 TABLET ORAL ONCE
Status: DISCONTINUED | OUTPATIENT
Start: 2023-10-26 | End: 2023-11-03

## 2023-10-26 RX ORDER — INSULIN LISPRO 100 [IU]/ML
3 INJECTION, SOLUTION INTRAVENOUS; SUBCUTANEOUS
Status: DISCONTINUED | OUTPATIENT
Start: 2023-10-26 | End: 2023-11-01

## 2023-10-26 RX ORDER — INSULIN GLARGINE 100 [IU]/ML
10 INJECTION, SOLUTION SUBCUTANEOUS
Status: DISCONTINUED | OUTPATIENT
Start: 2023-10-26 | End: 2023-10-31

## 2023-10-26 RX ORDER — CEFAZOLIN SODIUM 1 G/50ML
1000 SOLUTION INTRAVENOUS EVERY 24 HOURS
Status: DISCONTINUED | OUTPATIENT
Start: 2023-10-26 | End: 2023-10-30

## 2023-10-26 RX ADMIN — CLOPIDOGREL BISULFATE 75 MG: 75 TABLET ORAL at 08:34

## 2023-10-26 RX ADMIN — NORTRIPTYLINE HYDROCHLORIDE 10 MG: 10 CAPSULE ORAL at 20:28

## 2023-10-26 RX ADMIN — INSULIN LISPRO 3 UNITS: 100 INJECTION, SOLUTION INTRAVENOUS; SUBCUTANEOUS at 13:40

## 2023-10-26 RX ADMIN — HEPARIN SODIUM 5000 UNITS: 5000 INJECTION INTRAVENOUS; SUBCUTANEOUS at 20:28

## 2023-10-26 RX ADMIN — Medication 2.5 MG: at 14:06

## 2023-10-26 RX ADMIN — INSULIN LISPRO 2 UNITS: 100 INJECTION, SOLUTION INTRAVENOUS; SUBCUTANEOUS at 08:34

## 2023-10-26 RX ADMIN — CEFAZOLIN SODIUM 1000 MG: 1 SOLUTION INTRAVENOUS at 05:24

## 2023-10-26 RX ADMIN — SODIUM CHLORIDE, SODIUM GLUCONATE, SODIUM ACETATE, POTASSIUM CHLORIDE, MAGNESIUM CHLORIDE, SODIUM PHOSPHATE, DIBASIC, AND POTASSIUM PHOSPHATE 75 ML/HR: .53; .5; .37; .037; .03; .012; .00082 INJECTION, SOLUTION INTRAVENOUS at 08:44

## 2023-10-26 RX ADMIN — SUCRALFATE 1 G: 1 TABLET ORAL at 18:10

## 2023-10-26 RX ADMIN — INSULIN GLARGINE 10 UNITS: 100 INJECTION, SOLUTION SUBCUTANEOUS at 21:46

## 2023-10-26 RX ADMIN — INSULIN LISPRO 2 UNITS: 100 INJECTION, SOLUTION INTRAVENOUS; SUBCUTANEOUS at 13:39

## 2023-10-26 RX ADMIN — SUCRALFATE 1 G: 1 TABLET ORAL at 08:34

## 2023-10-26 RX ADMIN — PREGABALIN 75 MG: 50 CAPSULE ORAL at 08:34

## 2023-10-26 RX ADMIN — ACETAMINOPHEN 650 MG: 325 TABLET, FILM COATED ORAL at 11:22

## 2023-10-26 RX ADMIN — OXYBUTYNIN CHLORIDE 5 MG: 5 TABLET ORAL at 08:34

## 2023-10-26 RX ADMIN — TACROLIMUS 1 MG: 1 CAPSULE ORAL at 08:34

## 2023-10-26 RX ADMIN — ZOLPIDEM TARTRATE 5 MG: 5 TABLET ORAL at 21:47

## 2023-10-26 RX ADMIN — OXYBUTYNIN CHLORIDE 5 MG: 5 TABLET ORAL at 18:09

## 2023-10-26 RX ADMIN — SUCRALFATE 1 G: 1 TABLET ORAL at 13:45

## 2023-10-26 RX ADMIN — SODIUM BICARBONATE 650 MG TABLET 650 MG: at 08:34

## 2023-10-26 RX ADMIN — INSULIN LISPRO 2 UNITS: 100 INJECTION, SOLUTION INTRAVENOUS; SUBCUTANEOUS at 18:34

## 2023-10-26 RX ADMIN — ATORVASTATIN CALCIUM 80 MG: 80 TABLET, FILM COATED ORAL at 18:09

## 2023-10-26 RX ADMIN — INSULIN LISPRO 3 UNITS: 100 INJECTION, SOLUTION INTRAVENOUS; SUBCUTANEOUS at 18:35

## 2023-10-26 RX ADMIN — PANTOPRAZOLE SODIUM 40 MG: 40 TABLET, DELAYED RELEASE ORAL at 08:34

## 2023-10-26 RX ADMIN — HEPARIN SODIUM 5000 UNITS: 5000 INJECTION INTRAVENOUS; SUBCUTANEOUS at 06:16

## 2023-10-26 RX ADMIN — SUCRALFATE 1 G: 1 TABLET ORAL at 21:47

## 2023-10-26 RX ADMIN — SODIUM BICARBONATE 650 MG TABLET 650 MG: at 18:09

## 2023-10-26 RX ADMIN — TAMSULOSIN HYDROCHLORIDE 0.4 MG: 0.4 CAPSULE ORAL at 18:09

## 2023-10-26 RX ADMIN — PREGABALIN 75 MG: 50 CAPSULE ORAL at 18:09

## 2023-10-26 RX ADMIN — TACROLIMUS 1 MG: 1 CAPSULE ORAL at 20:27

## 2023-10-26 NOTE — PLAN OF CARE
Problem: Potential for Falls  Goal: Patient will remain free of falls  Description: INTERVENTIONS:  - Educate patient/family on patient safety including physical limitations  - Instruct patient to call for assistance with activity   - Consult OT/PT to assist with strengthening/mobility   - Keep Call bell within reach  - Keep bed low and locked with side rails adjusted as appropriate  - Keep care items and personal belongings within reach  - Initiate and maintain comfort rounds  - Make Fall Risk Sign visible to staff  - Offer Toileting every 3   Hours, in advance of need  - Initiate/Maintain bed alarmatients  - Consider moving patient to room near nurses station  Outcome: Progressing     Problem: MOBILITY - ADULT  Goal: Maintain or return to baseline ADL function  Description: INTERVENTIONS:  -  Assess patient's ability to carry out ADLs; assess patient's baseline for ADL function and identify physical deficits which impact ability to perform ADLs (bathing, care of mouth/teeth, toileting, grooming, dressing, etc.)  - Assess/evaluate cause of self-care deficits   - Assess range of motion  - Assess patient's mobility; develop plan if impaired  - Assess patient's need for assistive devices and provide as appropriate  - Encourage maximum independence but intervene and supervise when necessary  - Involve family in performance of ADLs  - Assess for home care needs following discharge   - Consider OT consult to assist with ADL evaluation and planning for discharge  - Provide patient education as appropriate  Outcome: Progressing

## 2023-10-26 NOTE — OCCUPATIONAL THERAPY NOTE
Occupational Therapy Progress Note     Patient Name: Puneet Fortune  Today's Date: 10/26/2023  Problem List  Principal Problem:    Fall  Active Problems:    Bladder cancer 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 [facility])    Insomnia    History of liver transplant ([address])    T2DM (type 2 diabetes mellitus) ([address])    Acute on chronic renal failure     UTI (urinary tract infection)    Dementia (HCC)    CKD (chronic kidney disease)    Thrombocytopenia (H[facility])    History of CVA (cerebrovascular accident)    Anemia            10/26/23 1047   OT Last Visit   OT Visit Date 10/26/23   Note Type   Note Type Treatment   Pain Assessment   Pain Assessment Tool 0-10   Pain Score 9   Pain Location/Orientation Location: Head   Hospital Pain Intervention(s)   (RN made aware of pain)   Restrictions/Precautions   Weight Bearing Precautions Per Order No   Other Precautions Cognitive; Chair Alarm; Bed Alarm;Multiple lines;Pain; Fall Risk   Lifestyle   Autonomy unknown PLOF. pt unable to report   ADL   Where Assessed Other (Comment)  (standing at tray table)   Grooming Assistance 4  Minimal Assistance   Grooming Deficit Setup; Increased time to complete;Wash/dry hands; Wash/dry face; Teeth care;Brushing hair   Grooming Comments assistance for balance while completing tasks standing   Functional Standing Tolerance   Time ~ 4 mins   Activity Dynamic standing balance   Comments Pt was able to tolerate standing forr ~ 4 mins to complete grooming tasks with min A   Bed Mobility   Supine to Sit 4  Minimal assistance   Additional items Assist x 1; Increased time required;LE management   Sit to Supine Unable to assess   Additional Comments OOB in the chair at the end of the session with chair alarm   Transfers   Sit to Stand 4  Minimal assistance   Additional items Assist x 1; Increased time required   Stand to Sit 4  Minimal assistance   Additional items Assist x 1; Increased time required   Additional Comments RW. Pt's BP sitting was 103/66. Standing 65/41.  Pt was given UE and LE exercises - after activity 99/62   Functional Mobility   Functional Mobility 4  Minimal assistance   Additional Comments Pt was able to demonstrate household mobility with min Ax1 and RW. Additional items Rolling walker   Subjective   Subjective "My head hurts"   Cognition   Overall Cognitive Status Impaired   Arousal/Participation Cooperative   Attention Attends with cues to redirect   Orientation Level Oriented to person;Oriented to place;Oriented to situation   Memory Decreased recall of precautions;Decreased recall of recent events;Decreased short term memory   Following Commands Follows one step commands with increased time or repetition   Comments Pt was pleasant and cooperative t/o session. Activity Tolerance   Activity Tolerance Patient limited by fatigue;Patient limited by pain   Medical Staff Made Aware RN made aware. PT Michelle   Assessment   Assessment Pt was seen on 10/26/2023 for skilled OT session. OT session was focused on ADLs, functional mobility, functional transfers, static/dynamic balance, safety awareness, education, increased activity tolerance, and energy conservation. Pt was agreeable to OT session. Pt was seen with PT 2* medical complexity. Pt was able to tolerate standing to complete grooming tasks with min Ax1. Pt requires verbal cueing for safety awareness and balance t/o session. Pt was able to demonstrate bed mobility, transfers, and functional mobility with min Ax1 and RW. Pt is limited 2* pain, endurance, activity tolerance, functional mobility, balance, functional standing tolerance, and decreased I w/ ADLS/IADLS. The following Occupational Performance Areas to address include: eating, grooming, bathing/shower, toilet hygiene, dressing, health maintenance, and functional mobility. Pt will continue to benefit from skilled OT services 2-3x a wk to address functional goals. The patient's raw score on the AM-PAC Daily Activity Inpatient Short Form is 16.  A raw score of less than 19 suggests the patient may benefit from discharge to post-acute rehabilitation services. Please refer to the recommendation of the Occupational Therapist for safe discharge planning. OT recommendations for d/c include post acute rehab. Plan   Treatment Interventions ADL retraining;Functional transfer training;UE strengthening/ROM; Endurance training;Equipment evaluation/education; Compensatory technique education;Continued evaluation; Energy conservation; Activityengagement   Goal Expiration Date 11/07/23   OT Treatment Day 1   OT Frequency 2-3x/wk   Discharge Recommendation   OT Discharge Recommendation Post acute rehabilitation services   AM-PAC Daily Activity Inpatient   Lower Body Dressing 2   Bathing 2   Toileting 2   Upper Body Dressing 3   Grooming 3   Eating 4   Daily Activity Raw Score 16   Daily Activity Standardized Score (Calc for Raw Score >=11) 35.96   AM-PAC Applied Cognition Inpatient   Following a Speech/Presentation 3   Understanding Ordinary Conversation 4   Taking Medications 3   Remembering Where Things Are Placed or Put Away 2   Remembering List of 4-5 Errands 2   Taking Care of Complicated Tasks 2   Applied Cognition Raw Score 16   Applied Cognition Standardized Score 35.03   End of Consult   Education Provided Yes   Patient Position at End of Consult Bedside chair;Bed/Chair alarm activated; All needs within reach   Nurse Communication Nurse aware of consult     Elizabet Cabrera OTR/L

## 2023-10-26 NOTE — PLAN OF CARE
Problem: OCCUPATIONAL THERAPY ADULT  Goal: Performs self-care activities at highest level of function for planned discharge setting. See evaluation for individualized goals. Note: Limitation: Decreased ADL status, Decreased Safe judgement during ADL, Decreased endurance, Decreased self-care trans, Decreased high-level ADLs     Assessment: Pt was seen on 10/26/2023 for skilled OT session. OT session was focused on ADLs, functional mobility, functional transfers, static/dynamic balance, safety awareness, education, increased activity tolerance, and energy conservation. Pt was agreeable to OT session. Pt was seen with PT 2* medical complexity. Pt was able to tolerate standing to complete grooming tasks with min Ax1. Pt requires verbal cueing for safety awareness and balance t/o session. Pt was able to demonstrate bed mobility, transfers, and functional mobility with min Ax1 and RW. Pt is limited 2* pain, endurance, activity tolerance, functional mobility, balance, functional standing tolerance, and decreased I w/ ADLS/IADLS. The following Occupational Performance Areas to address include: eating, grooming, bathing/shower, toilet hygiene, dressing, health maintenance, and functional mobility. Pt will continue to benefit from skilled OT services 2-3x a wk to address functional goals. The patient's raw score on the AM-PAC Daily Activity Inpatient Short Form is 16. A raw score of less than 19 suggests the patient may benefit from discharge to post-acute rehabilitation services. Please refer to the recommendation of the Occupational Therapist for safe discharge planning. OT recommendations for d/c include post acute rehab.      OT Discharge Recommendation: Post acute rehabilitation services

## 2023-10-26 NOTE — CONSULTS
Consultation - Infectious Disease   Ana Barnes 74 y.o. adult MRN: [id number]  Unit/Bed#: NW8 870-01 Encounter: [id number]      IMPRESSION & RECOMMENDATIONS:     1. Ascending urinary tract infection. In the setting of #5 below and internal right ureteral stent. Patient with reported dysuria prior to admission, UA with innumerable white blood cells. Urine culture is growing MSSA and there is low colony count of alpha hemolytic strep. CT A/P shows moderate right hydronephrosis, right urothelial thickening and enhancement and bladder wall thickening concerning for a sending ureteritis and cystitis. Patient is refusing Mcallister catheter placement for decompression   -Continue IV cefazolin 1g every 24 hours, renally dose adjusted   -Follow-up blood cultures   -Urology follow-up, no indication for stent exchange   -Patient refusing Mcallister catheter placement for urinary decompression    2. Staphylococcus aureus bacteremia. Due to #1 above. While Staphylococcus aureus isolated in the urine is likely due to seeding from a bloodstream infection, in this case a UTI seems to be the primary cause of his bacteremia. Risk factors for isolation of staph in the urine include prior urinary tract manipulation and his chronic stent. He has no open skin wounds or signs of SSTI. The patient does have a Port-A-Cath in place which has not been used for medication infusion outpatient   -Continue IV cefazolin   -Follow-up pending blood cultures, repeat blood culture set tomorrow   -Follow-up TTE   -Follow-up chest x-ray read   -The patient is not on chemotherapy but has a Port-A-Cath in place. While Port salvage can be attempted, if the patient does not have an indication for a chronic port, in the setting of Staphylococcus aureus bacteremia and this should be removed    3. Sepsis, evolving since admission with fever and tachycardia. Due to #1 and 2 above.   Also consider contribution of malignancy.   -Antibiotics as above   -Follow-up blood cultures   -Monitor fever curve, white blood cell count    4. 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 Likely secondary to ATN and hydronephrosis. Prior baseline creatinine 3.6 but elevated to 4.25 on admission. Patient has refused dialysis   -Nephrology follow-up ongoing   -Dose adjust antibiotics for creatinine clearance    5. Advanced bladder cancer status post TURBT May 4541 complicated by right ureteral obstruction status post right internal ureteral stent. The patient has metastatic disease to the lungs. He previously underwent radiation and chemotherapy but is no longer on cancer directed therapy.   -Urology follow-up   -Palliative care follow-up outpatient    6. History of liver transplant in 2002. Due to alcohol abuse and hepatitis C. Patient currently on tacrolimus.   -Follow-up tacrolimus level    7. Type 2 diabetes mellitus with hyperglycemia. Recent hemoglobin A1c 7.3%. This is a risk factor for infection. Glycemic control per primary. 8.  Thrombocytopenia, chronic. Monitor platelets    I have discussed the above management plan in detail with the primary service and patient. ID will follow. I have performed an extensive review of the medical records in Epic including review of the notes, radiographs, and laboratory results     HISTORY OF PRESENT ILLNESS:  Reason for Consult: Staph aureus bacteremia  HPI: Leidy Hammonds is a 74 y.o. man with a history of cirrhosis secondary to alcohol abuse and hepatitis-C, status post liver transplantation in 2002 now on tacrolimus, CVA s/p left frontotemporal craniotomy for 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, type 2 DM,CKD stage 4, advanced bladder cancer with pulmonary metastases status post TURBT May 0829 complicated by right ureteral obstruction status post intraluminal ureteral stent. The patient previously underwent chemotherapy and radiation therapy but is no longer on treatment for his cancer.   The patient presented to [address] [place] [address] on 10/23/2023 with weakness, dizziness, multiple falls. The patient had not been eating or drinking well. Per his family he was also having dysuria. On presentation the patient was initially afebrile without leukocytosis, but the following day developed temperature 101.7. Trauma survey did not reveal any injuries. CT A/P showed right renal atrophy, moderate right hydronephrosis with double-J right ureteral stent, mild diffuse right urothelial thickening and enhancement and thickening of the urinary bladder and adjacent fat stranding. He was started on IV ceftriaxone. Urine culture grew 60-69K colonies MSSA and low growth of alpha hemolytic strep. Only 1 set of admission blood cultures was collected which is growing Staphylococcus aureus, likely MSSA based on rapid ID panel. Antibiotics were changed to IV cefazolin and ID is consulted for further evaluation. Last night, the patient had fever to 102.5. CXR pain which shows bilateral pulmonary metastases. The patient has been seen by neurology this admission for TAOVN but has now consented to dialysis if needed. He is also being seen by urology who recommended maintaining his current stent but placing a Mcallister catheter. The patient refused Mcallister catheter placement. Today, his main complaint is a persistent cough and chest pain from this which she states has been going on for a while. He has mild lower abdominal pain, no dysuria at this time. He has mild bilateral flank pain. He denies any pain in his back. He has no skin injuries or wounds from his falls. The patient has a Port-A-Cath in place, seems it is mainly in place for access. He is no longer on chemotherapy or regular infusions. REVIEW OF SYSTEMS:  A complete review of systems is negative other than that noted in the HPI. PAST MEDICAL HISTORY:  Past Medical History:   Diagnosis Date    Cirrhosis ([address])     Dementia ([address])      History reviewed.  No pertinent surgical history. FAMILY HISTORY:  Non-contributory    SOCIAL HISTORY:  Social History   Social History     Substance and Sexual Activity   Alcohol Use None     Social History     Substance and Sexual Activity   Drug Use Not on file     Social History     Tobacco Use   Smoking Status Not on file   Smokeless Tobacco Not on file       ALLERGIES:  No Known Allergies    MEDICATIONS:  All current active medications have been reviewed. PHYSICAL EXAM:  Temp:  [98 °F (36.7 °C)-102.5 °F (39.2 °C)] 98.8 °F (37.1 °C)  HR:  [] 95  Resp:  [16-25] 16  BP: (118-139)/(65-74) 119/72  SpO2:  [97 %-100 %] 97 %  Temp (24hrs), Av.6 °F (37.6 °C), Min:98 °F (36.7 °C), Max:102.5 °F (39.2 °C)  Current: Temperature: 98.8 °F (37.1 °C)    Intake/Output Summary (Last 24 hours) at 10/26/2023 1403  Last data filed at 10/26/2023 0810  Gross per 24 hour   Intake 415 ml   Output 890 ml   Net -475 ml       General Appearance:  Frail appearing but nontoxic, no acute distress   Head:  Normocephalic, without obvious abnormality, atraumatic   Eyes:  Conjunctiva pink and sclera anicteric, both eyes   Nose: Nares normal, mucosa normal, no drainage   Throat: Oropharynx moist without lesions   Neck: Supple, symmetrical, no adenopathy, no tenderness/mass/nodules   Back:   Symmetric, no curvature, ROM normal, no CVA tenderness   Lungs:   Clear to auscultation bilaterally, respirations unlabored   Chest Wall:  No tenderness or deformity. Right chest wall Port-A-Cath in place, currently accessed   Heart:  RRR; no murmur, rub or gallop   Abdomen:   Soft, mild diffuse lower abdominal tenderness   Extremities: No cyanosis, clubbing or edema   Skin: No rashes or lesions. No draining wounds noted. Lymph nodes: Cervical, supraclavicular nodes normal   Neurologic: Alert and oriented times 3, extremity strength 5/5 and symmetric       LABS, IMAGING, & OTHER STUDIES:  Lab Results:  I have personally reviewed pertinent labs.   Results from last 7 days   Lab Units 10/26/23  0636 10/26/23  0154 10/25/23  0554   WBC Thousand/uL 5.54 6.08 5.96   HEMOGLOBIN g/dL 8.1* 8.5* 6.9*   PLATELETS Thousands/uL 75* 82* 74*     Results from last 7 days   Lab Units 10/26/23  0636 10/25/23  0554 10/24/23  0534 10/23/23  1133   SODIUM mmol/L 134* 134* 134* 135   POTASSIUM mmol/L 3.9 4.1 4.4 4.5   CHLORIDE mmol/L 104 104 104 108   CO2 mmol/L 20* 20* 20* 16*   BUN mg/dL 50* 55* 52* 56*   CREATININE mg/dL 4.85* 5.01* 4.20* 4.25*   CALCIUM mg/dL 7.0* 7.2* 7.2* 7.7*   AST U/L  --   --   --  37   ALT U/L  --   --   --  16   ALK PHOS U/L  --   --   --  59     Results from last 7 days   Lab Units 10/26/23  0155 10/23/23  2216 10/23/23  1534   BLOOD CULTURE  Received in Microbiology Lab. Culture in Progress. Staphylococcus aureus*  --    GRAM STAIN RESULT   --  Gram positive cocci in clusters*  --    URINE CULTURE   --   --  60,000-69,000 cfu/ml Staphylococcus aureus*  30,000-39,000 cfu/ml Alpha Hemolytic Streptococcus NOT Enterococcus*     Results from last 7 days   Lab Units 10/26/23  0154   PROCALCITONIN ng/ml 4.11*         Results from last 7 days   Lab Units 10/25/23  0554   FERRITIN ng/mL 324*           Imaging Studies:   I have personally reviewed pertinent imaging study reports and images in PACS. CT A/P-right renal atrophy and moderate right hydronephrosis with ureteral stent in place.   Mild diffuse right urothelial thickening and enhancement, thickening of the urinary bladder

## 2023-10-26 NOTE — PROGRESS NOTES
NEPHROLOGY PROGRESS NOTE   Lauren Sylvester 74 y.o. adult MRN: [id number]  Unit/Bed#: CJ2 870-01 Encounter: [id number]  Reason for Consult: TAVON    ASSESSMENT AND PLAN:  74 y.o. 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 Admitted after a fall . Nephrology is consulted for management of TAVON      PLAN     #Non-Oliguric KDIGO TAVON stage 1 on CKD G4 versus progression of CKD  Etiology: Likely secondary to ATN in the settings of recurrent TAVON with progression of CKD. Worsening kidney function in the settings of contrast associated nephropathy   Baseline creatinine: 3.6 mg/dL in August  Peak creatinine: 5.01  Current creatinine: 4.85 mg/dL trending down  UA: No hematuria, leukocyturia  Renal imaging :   Right renal atrophy with hydronephrosis, right double-J stent. urothelial thickening. No left hydronephrosis  Treatment:  No indication of dialysis at this time. See dialysis discussion  Maintain MAP:  Over 65 mmHg if possible/avoid hypoperfusion:  Hold parameters on blood pressure medications  Avoid nephrotoxic agents such as NSAIDs, and IV contrast if possible. Avoid opioids   Adjust medications to GFR     #CKD G4/5  Baseline creatinine: 3.6 mg/dL in August  Etiology: Likely secondary to ATN in the settings of recurrent TAVON with component of obstructive uropathy and nephron loss with atrophic kidney     Dialysis discussion with patient on 10/25 and 10/26: I informed patient of worsening kidney function. We talked about dialysis benefits and risk. He had a brief conversation about dialysis with primary nephrologist.  Patient is not sure if he wants to proceed with dialysis and wants to talk with his family about it. No urgent indication of dialysis today.   I offered to call family and discussed dialysis with them but patient prefers to talk to them personally        #Acid-base Disorder  serum HCO3 20 mmol/L  Metabolic acidosis,   continue sodium bicarbonate 650 twice daily     #Volume status/hypertension:  Volume: Remains euvolemic  Blood pressure: Normotensive, /72mmhg   <140/90  Recommend:  Low-sodium diet   enforce fluid intake  Decrease Plasma-Lyte to 50 mL/h due to poor fluid intake   Monitor urinary output    #Fever  Chest x-ray lateral infiltration  On cefazolin     #Anemia:  Current hemoglobin: 8.1 mg/dL  Treatment:  Transfuse for hemoglobin less than 7.0 per primary service       #Fall  Dizziness, on meclizine   PT OT        #Liver transplant   on tacrolimus twice daily  Tacrolimus levels    SUBJECTIVE:  Patient seen and examined at bedside. No chest pain, shortness of breath, nausea, vomiting, abdominal pain or diarrhea.      OBJECTIVE:  Current Weight: Weight - Scale: 56.7 kg (125 lb)  Vitals:    10/26/23 0810   BP: 119/72   Pulse: 95   Resp: 16   Temp: 98.8 °F (37.1 °C)   SpO2: 97%       Intake/Output Summary (Last 24 hours) at 10/26/2023 0936  Last data filed at 10/26/2023 0524  Gross per 24 hour   Intake 325 ml   Output 650 ml   Net -325 ml     Wt Readings from Last 3 Encounters:   10/25/23 56.7 kg (125 lb)     Temp Readings from Last 3 Encounters:   10/26/23 98.8 °F (37.1 °C) (Oral)     BP Readings from Last 3 Encounters:   10/26/23 119/72     Pulse Readings from Last 3 Encounters:   10/26/23 95      General:  no acute distress at this time  Skin:  No acute rash  Eyes:  No scleral icterus and noninjected  ENT:  mucous membranes moist  Neck:  no carotid bruits  Chest:  Clear to auscultation percussion, good respiratory effort, no use of accessory respiratory muscles  CVS:  Regular rate and rhythm without rub   Abdomen:  soft and nontender   Extremities: no significant lower extremity edema  Neuro:  No gross focality  Psych:  Alert , cooperative       Medications:    Current Facility-Administered Medications:     acetaminophen (TYLENOL) tablet 650 mg, 650 mg, Oral, Q6H PRN, Cele Verdugo, DO, 650 mg at 10/25/23 2233    atorvastatin (LIPITOR) tablet 80 mg, 80 mg, Oral, Daily With Dinner, [facility], DO, 80 mg at 10/25/23 1737    ceFAZolin (ANCEF) IVPB (premix in dextrose) 1,000 mg 50 mL, 1,000 mg, Intravenous, Q24H, LUCY Jacobs, Last Rate: 100 mL/hr at 10/26/23 0524, 1,000 mg at 10/26/23 0524    clopidogrel (PLAVIX) tablet 75 mg, 75 mg, Oral, Daily, Cele Verdugo, DO, 75 mg at 10/26/23 0834    heparin (porcine) subcutaneous injection 5,000 Units, 5,000 Units, Subcutaneous, Q8H [address], Cele De La Fuenteel, DO, 5,000 Units at 10/26/23 0616    insulin lispro (HumaLOG) 100 units/mL subcutaneous injection 1-5 Units, 1-5 Units, Subcutaneous, TID AC, 2 Units at 10/26/23 0834 **AND** Fingerstick Glucose (POCT), , , TID AC, Everkumar Verdugo, DO    meclizine (ANTIVERT) tablet 25 mg, 25 mg, Oral, Q8H PRN, Everkumar Verdugo, DO    multi-electrolyte (PLASMALYTE-A/ISOLYTE-S PH 7.4) IV solution, 50 mL/hr, Intravenous, Continuous, Suhail Keller MD, Last Rate: 75 mL/hr at 10/26/23 0844, 75 mL/hr at 10/26/23 0844    nortriptyline (PAMELOR) capsule 10 mg, 10 mg, Oral, HS, Cele Verdugo, DO, 10 mg at 10/25/23 2202    oxybutynin (DITROPAN) tablet 5 mg, 5 mg, Oral, BID, Everkmario Verdugo, DO, 5 mg at 10/26/23 0834    pantoprazole (PROTONIX) EC tablet 40 mg, 40 mg, Oral, Daily, Cele De La Fuenteel, DO, 40 mg at 10/26/23 0834    pregabalin (LYRICA) capsule 75 mg, 75 mg, Oral, BID, Cele Verdugo DO, 75 mg at 10/26/23 0834    sodium bicarbonate tablet 650 mg, 650 mg, Oral, BID after meals, Suhail Keller MD, 650 mg at 10/26/23 0834    sucralfate (CARAFATE) tablet 1 g, 1 g, Oral, 4x Daily (AC & HS), Cele Verdugo DO, 1 g at 10/26/23 0834    tacrolimus (PROGRAF) capsule 1 mg, 1 mg, Oral, Q12H [address], Cele Verdugo DO, 1 mg at 10/26/23 0834    tamsulosin (FLOMAX) capsule 0.4 mg, 0.4 mg, Oral, Daily With Dinner, Cele Verdugo, DO, 0.4 mg at 10/25/23 1737    temazepam (RESTORIL) capsule 15 mg, 15 mg, Oral, HS PRN, Cele Verdugo DO, 15 mg at 10/25/23 2202    zolpidem (AMBIEN) tablet 5 mg, 5 mg, Oral, HS PRN, James Kelley DO, 5 mg at 10/25/23 2202    Laboratory Results:  Results from last 7 days   Lab Units 10/26/23  0636 10/26/23  0154 10/25/23  0554 10/24/23  0534 10/23/23  1133   WBC Thousand/uL 5.54 6.08 5.96 6.24 8.06   HEMOGLOBIN g/dL 8.1* 8.5* 6.9* 7.5* 8.0*   HEMATOCRIT % 24.0* 26.0* 21.0* 23.9* 25.7*   PLATELETS Thousands/uL 75* 82* 74* 72* 68*   SODIUM mmol/L 134*  --  134* 134* 135   POTASSIUM mmol/L 3.9  --  4.1 4.4 4.5   CHLORIDE mmol/L 104  --  104 104 108   CO2 mmol/L 20*  --  20* 20* 16*   BUN mg/dL 50*  --  55* 52* 56*   CREATININE mg/dL 4.85*  --  5.01* 4.20* 4.25*   CALCIUM mg/dL 7.0*  --  7.2* 7.2* 7.7*       CT abdomen pelvis with contrast   Final Result by Chapin Andrews MD (10/23 1083)      1. Redemonstration of right renal atrophy and moderate right hydronephrosis with a double-J right ureteral stent. There is mild diffuse right urothelial thickening and enhancement. There is also mild diffuse thickening of the urinary bladder and adjacent    fat stranding. This may be explained by an infectious etiology including right ureteritis and cystitis. 2. Skin thickening and/or subcutaneous edema in the periumbilical area may be due to history of trauma. Workstation performed: [id number]         CT head without contrast   Final Result by Janeth Jay DO (10/23 1300)   Addendum (preliminary) 1 of 1 by Janeth Jay DO (10/23 1300)   ADDENDUM:   Prior from July 31, 2023 performed under separate medical record number. Prior left-sided aneurysm clipping and local encephalomalacia is    unchanged. I personally discussed the CT brain and C-spine with Clarence    on 10/23/2023 1:00 PM.            Final   No acute intracranial abnormality. Workstation performed: [id number]         CT spine cervical without contrast   Final Result by Unique Epps DO (10/23 1253)   No cervical spine fracture or traumatic malalignment. Workstation performed: [id number]         XR trauma multiple   Final Result by Jorge Cantor MD (10/23 1643)      Spiculated mass in the right upper lobe, little changed since 8/1/2023. Interval increase in size of bilateral pulmonary metastases. No evidence of acute traumatic abnormality in the chest.      The study was marked in [facility] for immediate notification. Workstation performed: [id number]         XR chest portable   Final Result by Jorge Cantor MD (10/24 6355)      Spiculated mass in the right upper lobe, little changed since 8/1/2023. Interval increase in size of bilateral pulmonary metastases. No evidence of acute traumatic abnormality in the chest.      The study was marked in [facility] for immediate notification. Workstation performed: [id number]         XR chest portable    (Results Pending)   XR chest pa & lateral    (Results Pending)       Portions of the record may have been created with voice recognition software. Occasional wrong word or "sound a like" substitutions may have occurred due to the inherent limitations of voice recognition software. Read the chart carefully and recognize, using context, where substitutions have occurred.

## 2023-10-26 NOTE — CASE MANAGEMENT
Case Management Assessment & Discharge Planning Note    Patient name Nani Barton  Location PH9 870/NW8 369-77 MRN [id number]  : 1950 Date 10/26/2023       Current Admission Date: 10/23/2023  Current Admission Diagnosis:Fall   Patient Active Problem List    Diagnosis Date Noted    Fall 10/23/2023    Bladder cancer 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 ([address]) 10/23/2023    Insomnia 10/23/2023    History of liver transplant ([address]) 10/23/2023    T2DM (type 2 diabetes mellitus) ([address]) 10/23/2023    Acute on chronic renal failure  10/23/2023    UTI (urinary tract infection) 10/23/2023    Dementia ([address]) 10/23/2023    CKD (chronic kidney disease) 10/23/2023    Thrombocytopenia ([address]) 10/23/2023    History of CVA (cerebrovascular accident) 10/23/2023    Anemia 10/23/2023      LOS (days): 3  Geometric Mean LOS (GMLOS) (days): 3.90  Days to GMLOS:1.1     OBJECTIVE:    Risk of Unplanned Readmission Score: 26.65    Current admission status: Inpatient     Preferred Pharmacy:   [address] TO E-PRESCRIBE  No address on file    Primary Care Provider: Malgorzata Hernández DO    Primary Insurance: [employer]  Secondary Insurance:     ASSESSMENT:  [place] Proxies    There are no active Health Care Proxies on file. Readmission Root Cause  30 Day Readmission: No    Patient Information  Admitted from[de-identified] Home  Mental Status: Alert  During Assessment patient was accompanied by: Not accompanied during assessment  Assessment information provided by[de-identified] Patient  Primary Caregiver: Self  Support Systems: Self, Spouse/significant other, Daughter  [facility]: [address] [place] [address] do you live in?: [facility] entry access options.  Select all that apply.: Stairs  Number of steps to enter home.: 3  Do the steps have railings?: Yes  Type of Current Residence: [address] [place] [address] home  Upon entering residence, is there a bedroom on the main floor (no further steps)?: Yes  Upon entering residence, is there a bathroom on the main floor (no further steps)?: Yes  In the last 12 months, was there a time when you were not able to pay the mortgage or rent on time?: No  In the last 12 months, how many places have you lived?: 1  In the last 12 months, was there a time when you did not have a steady place to sleep or slept in a shelter (including now)?: No  Homeless/housing insecurity resource given?: N/A  Living Arrangements: Lives w/ Spouse/significant other, Lives w/ Daughter  Is patient a ?: No    Activities of Daily Living Prior to Admission  Functional Status: Independent  Completes ADLs independently?: Yes  Ambulates independently?: Yes (States becoming more difficult to ambulate)  Does patient use assisted devices?: No  Does patient currently own DME?: No  Does patient have a history of Outpatient Therapy (PT/OT)?: No  Does the patient have a history of Short-Term Rehab?: Yes (long ago and couldn't recall name)  Does patient have a history of HHC?: No  Does patient currently have [facility]?: No  Patient Information Continued  Income Source: SSI/SSD  Does patient have prescription coverage?: Yes  Within the past 12 months, you worried that your food would run out before you got the money to buy more.: Never true  Within the past 12 months, the food you bought just didn't last and you didn't have money to get more.: Never true  Food insecurity resource given?: N/A  Does patient receive dialysis treatments?: No  Does patient have a history of substance abuse?: No  Does patient have a history of Mental Health Diagnosis?: No  DISCHARGE DETAILS:    Discharge planning discussed with[de-identified] patient  Freedom of Choice: Yes  Comments - Freedom of Choice: [facility] referral sent  CM contacted family/caregiver?: No- see comments  Were Treatment Team discharge recommendations reviewed with patient/caregiver?: Yes  Did patient/caregiver verbalize understanding of patient care needs?: Yes  Were patient/caregiver advised of the risks associated with not following Treatment Team discharge recommendations?: Yes    Contacts  Patient Contacts: Mendoza Renetta  Relationship to Patient[de-identified] Family  Contact Method: Phone  Phone Number: [phone] or [phone]  Reason/Outcome: Continuity of Care, Emergency Contact, Discharge Planning    Other Referral/Resources/Interventions Provided:  Interventions: Short Term Rehab  Treatment Team Recommendation: Short Term Rehab  Discharge Destination Plan[de-identified] Short Term Rehab   Patient independent with ambulation and adl's but states it's getting more difficult. CM discussed therapy recommendations for STR at discharge and patient agrees to a blanket referral being sent. Patient lives with spouse and daughter and states he has a bedroom and bath on the 1st level of home. He received covid vaccination but not sure how many he received. CM to follow with discharge needs.

## 2023-10-26 NOTE — PROGRESS NOTES
[address] [facility]  Progress Note  Name: Lenka Moore  MRN: [id number]  Unit/Bed#: JE9 870-01 I Date of Admission: 10/23/2023   Date of Service: 10/26/2023 I Hospital Day: 3    Assessment/Plan   * Fall  Assessment & Plan  Past medical history of vertigo, dementia and cirrhosis presenting after multiple falls over last 2 days. After discussion with family, patient has been dizzy for several weeks with poor oral intake along with complaints of urinary frequency and dysuria. Meclizine as needed  Obtain orthostatic vitals  Work-up revealed UTI and bacteremia, treatment as below  PT/OT    MSSA bacteremia  Assessment & Plan  Plan as above    UTI (urinary tract infection)  Assessment & Plan  History of UTIs  Patient denies any dysuria however family reports that he has been complaining of dysuria for the last few days with mild confusion  UA with signs of infection  CT A/P with right renal atrophy, moderate right hydronephrosis with right ureteral stent. There is mild diffuse right urothelial thickening and enhancement. There is also mild diffuse thickening of the urinary bladder and adjacent fat stranding. This may be explained by infectious etiology including right ureteritis and cystitis.   Prior CT scan reviewed this similar ureteritis findings however cystitis appears to be new  Bcx negative for growth to date  Uine culture is positive for alpha step and MSSA, continue cefazolin  Per urology no indication for stent change  Monitor temps, WBCs  Repeat cultures  May need port removed    Acute on chronic renal failure   Assessment & Plan  History of CKD with baseline creatinine of 3.3-3.6  Likely TAVON on CKD due to poor po intake and possible contrast nephropathy  Presenting with creatinine of 4.2  Not meeting criteria for TAVON though likely elevated secondary to underlying CKD and poor oral intake  Received IV contrast for trauma scans  IVF hydration  Monitor creatinine daily  Avoid nephrotoxic agents and relative hypotension  Nephrology following, no indication to do HD at this time. Anemia  Assessment & Plan  Chronic anemia likely due to CKD and multiple blood draws  Likely worsened due to acute infection  Check anemia studies and transfuse 1 unit of PBRCS 10/25  Blood consent obtained from his daughter    Thrombocytopenia [facility])  Assessment & Plan  Lab Results   Component Value Date    PLT 75 (L) 10/26/2023     Chronic at baseline    CKD (chronic kidney disease)  Assessment & Plan  Lab Results   Component Value Date    CREATININE 4.85 (H) 10/26/2023    CREATININE 5.01 (H) 10/25/2023    CREATININE 4.20 (H) 10/24/2023   Plan as above    Dementia ([address])  Assessment & Plan  A&O x4  Outpatient follow-up  Delirium precautions    T2DM (type 2 diabetes mellitus) ([address])  Assessment & Plan  No results found for: "HGBA1C"    Recent Labs     10/25/23  1114 10/25/23  1655 10/26/23  0819 10/26/23  1129   POCGLU 190* 288* 298* 274*         Blood Sugar Average: Last 72 hrs:  (P) 241.625  Not on any oral agents  SSI while inpatient  Monitor glucose closely  Prandial and basal insulin added    History of liver transplant ([address])  Assessment & Plan  History of liver transplant secondary to alcoholic cirrhosis  Continue tacrolimus 1 mg every 12 hours  Follow-up with GI outpatient    Insomnia  Assessment & Plan  Continue temazepam and Ambien which patient has been on for 30 years  Confirmed with PDMP    Bladder cancer 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 [facility])  Assessment & Plan  History of stage IV bladder tumor status post resection and radiation with metastasis to the lungs  Not a candidate for further therapy cancer directed treatment  No pain currently  Supportive care  Continue nortriptyline and Lyrica, PDMP reviewed  Follows up with palliative care outpatient             VTE Pharmacologic Prophylaxis: VTE Score: 6 Moderate Risk (Score 3-4) - Pharmacological DVT Prophylaxis Ordered: heparin. Patient Centered Rounds:  I performed bedside rounds with nursing staff today. Discussions with Specialists or Other Care Team Provider: nurse, CM, ID    Education and Discussions with Family / Patient: Updated  (wife) via phone. Total Time Spent on Date of Encounter in care of patient: 40 mins. This time was spent on one or more of the following: performing physical exam; counseling and coordination of care; obtaining or reviewing history; documenting in the medical record; reviewing/ordering tests, medications or procedures; communicating with other healthcare professionals and discussing with patient's family/caregivers. Current Length of Stay: 3 day(s)  Current Patient Status: Inpatient   Certification Statement: The patient will continue to require additional inpatient hospital stay due to bacteremia  Discharge Plan: Anticipate discharge in >72 hrs to home. Code Status: Level 3 - DNAR and DNI    Subjective:   Denies any new complaints. Objective:     Vitals:   Temp (24hrs), Av.1 °F (37.8 °C), Min:98.8 °F (37.1 °C), Max:102.5 °F (39.2 °C)    Temp:  [98.8 °F (37.1 °C)-102.5 °F (39.2 °C)] 98.8 °F (37.1 °C)  HR:  [] 95  Resp:  [16-25] 16  BP: (119-139)/(71-74) 119/72  SpO2:  [97 %-98 %] 97 %  Body mass index is 22.14 kg/m². Input and Output Summary (last 24 hours): Intake/Output Summary (Last 24 hours) at 10/26/2023 1512  Last data filed at 10/26/2023 1401  Gross per 24 hour   Intake 90 ml   Output 1190 ml   Net -1100 ml       Physical Exam:   Physical Exam  Constitutional:       Appearance: Normal appearance. HENT:      Head: Normocephalic and atraumatic. Nose: Nose normal.   Eyes:      Extraocular Movements: Extraocular movements intact. Cardiovascular:      Rate and Rhythm: Normal rate and regular rhythm. Pulmonary:      Effort: Pulmonary effort is normal.   Musculoskeletal:      Right lower leg: No edema. Left lower leg: No edema. Skin:     General: Skin is warm. Neurological:      Mental Status: Danny Rdz is alert and oriented to person, place, and time. Psychiatric:         Mood and Affect: Mood normal.         Behavior: Behavior normal.          Additional Data:     Labs:  Results from last 7 days   Lab Units 10/26/23  0636   WBC Thousand/uL 5.54   HEMOGLOBIN g/dL 8.1*   HEMATOCRIT % 24.0*   PLATELETS Thousands/uL 75*   NEUTROS PCT % 77*   LYMPHS PCT % 11*   MONOS PCT % 9   EOS PCT % 2     Results from last 7 days   Lab Units 10/26/23  0636 10/24/23  0534 10/23/23  1133   SODIUM mmol/L 134*   < > 135   POTASSIUM mmol/L 3.9   < > 4.5   CHLORIDE mmol/L 104   < > 108   CO2 mmol/L 20*   < > 16*   BUN mg/dL 50*   < > 56*   CREATININE mg/dL 4.85*   < > 4.25*   ANION GAP mmol/L 10   < > 11   CALCIUM mg/dL 7.0*   < > 7.7*   ALBUMIN g/dL  --   --  3.3*   TOTAL BILIRUBIN mg/dL  --   --  0.44   ALK PHOS U/L  --   --  59   ALT U/L  --   --  16   AST U/L  --   --  37   GLUCOSE RANDOM mg/dL 245*   < > 188*    < > = values in this interval not displayed. Results from last 7 days   Lab Units 10/26/23  1129 10/26/23  0819 10/25/23  1655 10/25/23  1114 10/25/23  0543 10/24/23  1618 10/24/23  1116 10/24/23  0757   POC GLUCOSE mg/dl 274* 298* 288* 190* 201* 272* 212* 198*         Results from last 7 days   Lab Units 10/26/23  0154   PROCALCITONIN ng/ml 4.11*       Lines/Drains:  Invasive Devices       Central Venous Catheter Line  Duration             Port A Cath Right -- days                    Central Line:  Goal for removal: Port accessed. Will de-access as appropriate. Imaging: No pertinent imaging reviewed. Recent Cultures (last 7 days):   Results from last 7 days   Lab Units 10/26/23  0155 10/23/23  2216 10/23/23  1534   BLOOD CULTURE  Received in Microbiology Lab. Culture in Progress.  Staphylococcus aureus*  --    GRAM STAIN RESULT   --  Gram positive cocci in clusters*  --    URINE CULTURE   --   --  60,000-69,000 cfu/ml Staphylococcus aureus* 30,000-39,000 cfu/ml Alpha Hemolytic Streptococcus NOT Enterococcus*       Last 24 Hours Medication List:   Current Facility-Administered Medications   Medication Dose Route Frequency Provider Last Rate    acetaminophen  650 mg Oral Q6H PRN [facility], DO      atorvastatin  80 mg Oral Daily With [employer], DO      cefazolin  1,000 mg Intravenous Q24H YuHOLLIE CanasNP 1,000 mg (10/26/23 0524)    clopidogrel  75 mg Oral Daily Cele Verdugo, DO      docusate sodium  100 mg Oral BID Humera Crespo MD      heparin (porcine)  5,000 Units Subcutaneous Q8H [address] [facility], DO      insulin glargine  10 Units Subcutaneous HS Humera Crespo MD      insulin lispro  1-5 Units Subcutaneous TID AC Cele Verdugo, DO      insulin lispro  3 Units Subcutaneous TID With Meals Humera Crespo MD      meclizine  25 mg Oral Q8H PRN [employer], DO      multi-electrolyte  50 mL/hr Intravenous Continuous Yulia Mcdonald MD 50 mL/hr (10/26/23 1056)    nortriptyline  10 mg Oral HS Cele Verdugo, DO      oxybutynin  5 mg Oral BID Cele Verdugo, DO      oxyCODONE  2.5 mg Oral Q4H PRN Humera Crespo MD      pantoprazole  40 mg Oral Daily Cele Verdugo, DO      pregabalin  75 mg Oral BID Cele Verdugo, DO      senna  2 tablet Oral Once Humera Crespo MD      sodium bicarbonate  650 mg Oral BID after meals Yulia Mcdonald MD      sucralfate  1 g Oral 4x Daily (AC & HS) Cele Verdugo, DO      tacrolimus  1 mg Oral Q12H [address] [facility], DO      tamsulosin  0.4 mg Oral Daily With [employer], DO      temazepam  15 mg Oral HS PRN [facility], DO      zolpidem  5 mg Oral HS PRN [employer], DO          Today, Patient Was Seen By: Anthony Ling MD    **Please Note: This note may have been constructed using a voice recognition system. **

## 2023-10-26 NOTE — QUICK NOTE
Notified by nursing that pt with dry barking cough (new), fever 102.5, RR 24 and . Of note, he is currently being treated for UTI. Blood and urine cultures from 10/23 growing staph aureus, BCID growing MSSA. He is currently on ceftriaxone, will narrow to cefazolin and proceed with new sepsis workup. He does endorse continued dysuria at this time. Ordered CXR given cough. Wet read with new bilateral opacities though inspiratory effort appears poor on the film. Will send for PA and lateral films in the morning. On exam, lungs are clear, pt maintaining saturations in the 90s on room air and denies SOB, increase WOB. White count stable on CBC. Procal 4.11, though likely skewed by kidney function. HR and RR have improved with resolution of fever per nursing, though this has not yet been documented in flowsheet. C/w ancef.  Will f/u AM labs

## 2023-10-26 NOTE — PLAN OF CARE
Problem: PHYSICAL THERAPY ADULT  Goal: Performs mobility at highest level of function for planned discharge setting. See evaluation for individualized goals. Description: Treatment/Interventions: Functional transfer training, LE strengthening/ROM, Elevations, Therapeutic exercise, Endurance training, Patient/family training, Cognitive reorientation, Equipment eval/education, Bed mobility, Gait training, Spoke to nursing, Spoke to case management, OT          See flowsheet documentation for full assessment, interventions and recommendations. Outcome: Progressing  Note: Prognosis: Fair  Problem List: Decreased strength, Decreased endurance, Impaired balance, Decreased mobility, Decreased cognition, Decreased safety awareness, Impaired vision, Pain  Assessment: Pt seen for PT treatment session w/ focus on bed mobility training, t/f training, dynamic standing balance instruction, + gait training. Pt demonstrated progress this session w/ decreased assistance for bed mobility, t/f, ambulation, + increased ambulation distance. Pt overall limited from further progress by headache + orthostatic hypotension. Pt w/ impaired balance w/ ADL tasks as well. Continue to recommend rehab upon d/c to address above-stated problem list.  Barriers to Discharge: Inaccessible home environment     PT Discharge Recommendation: Post acute rehabilitation services    See flowsheet documentation for full assessment.

## 2023-10-26 NOTE — PLAN OF CARE
Problem: MOBILITY - ADULT  Goal: Maintain or return to baseline ADL function  Description: INTERVENTIONS:  -  Assess patient's ability to carry out ADLs; assess patient's baseline for ADL function and identify physical deficits which impact ability to perform ADLs (bathing, care of mouth/teeth, toileting, grooming, dressing, etc.)  - Assess/evaluate cause of self-care deficits   - Assess range of motion  - Assess patient's mobility; develop plan if impaired  - Assess patient's need for assistive devices and provide as appropriate  - Encourage maximum independence but intervene and supervise when necessary  - Involve family in performance of ADLs  - Assess for home care needs following discharge   - Consider OT consult to assist with ADL evaluation and planning for discharge  - Provide patient education as appropriate  Outcome: Not Progressing  Goal: Maintains/Returns to pre admission functional level  Description: INTERVENTIONS:  - Perform BMAT or MOVE assessment daily.   - Set and communicate daily mobility goal to care team and patient/family/caregiver. - Collaborate with rehabilitation services on mobility goals if consulted  - Perform Range of Motion    times a day. - Reposition patient every    hours. - Dangle patient    times a day  - Stand patient    times a day  - Ambulate patient    times a day  - Out of bed to chair    times a day   - Out of bed for meals    times a day  - Out of bed for toileting  - Record patient progress and toleration of activity level   Outcome: Not Progressing   Patient developed fever at start of first hours of shift as well as a dry cough. Physician notified d/t this being second night patient has developed fever over hs. Asked for review of antibiotics and pt assessment which was provided by Resident Oncall and orders placed. Callbell and personal items are within reach, bed in low position,  no needs, or complaints at this time.   Did express concerns of potential fluid overload d/t cxr, pt has received IVC x3D, needed a unit of blood yesterday.

## 2023-10-26 NOTE — PHYSICAL THERAPY NOTE
Physical Therapy Treatment Note    Patient's Name: Leidy Hammonds  : 1950     10/26/23 1046   PT Last Visit   PT Visit Date 10/26/23   Note Type   Note Type Treatment   Pain Assessment   Pain Assessment Tool 0-10   Pain Score 9   Pain Location/Orientation Location: Head   Hospital Pain Intervention(s)   (RN aware pt requesting pain meds)   Restrictions/Precautions   Weight Bearing Precautions Per Order No   Other Precautions Cognitive; Chair Alarm; Bed Alarm;Multiple lines; Fall Risk;Pain  (orthostatic hypotension)   General   Chart Reviewed Yes   Additional Pertinent History (S)  Vitals assessed in sitting (/66, ), standing (BP 65/51, ), and sitting in chair after ambulation (BP 99/62, ), +dizziness. RN made aware. Response to Previous Treatment Patient reporting fatigue but able to participate. Family/Caregiver Present No   Subjective   Subjective Pt agreeable to mobilize, reports increased difficulty w/ ambulation since coming to the hospital.   Bed Mobility   Supine to Sit 4  Minimal assistance   Additional items Assist x 1;HOB elevated; Increased time required;Verbal cues   Sit to Supine Unable to assess   Additional Comments Pt greeted in supine. Transfers   Sit to Stand 4  Minimal assistance   Additional items Assist x 1; Increased time required;Verbal cues   Stand to Sit 4  Minimal assistance   Additional items Assist x 1; Increased time required;Verbal cues   Additional Comments RW; MinAx1 for steadying, cues to perform transitions slowly   Ambulation/Elevation   Gait pattern Excessively slow; Short stride;Decreased foot clearance   Gait Assistance 4  Minimal assist   Additional items Assist x 1; Tactile cues; Verbal cues  (+2nd for line management + chair follow)   Assistive Device Rolling walker   Distance 5' + 36' + 35'   Balance   Static Sitting Fair   Dynamic Sitting Fair -   Static Standing Poor +   Dynamic Standing Poor +   Ambulatory Poor +  (RW)   Endurance Deficit Endurance Deficit Yes   Endurance Deficit Description lightheadedness, headache   Activity Tolerance   Activity Tolerance Patient limited by fatigue;Treatment limited secondary to medical complications (Comment); Patient limited by pain  (headache, lightheadedness)   Medical Staff Made Aware OT Oanh   Nurse Made Aware yes - updated   Exercises   Neuro re-ed Dynamic standing balance, multidirectional reaching while performing ADL tasks while alternating between unilateral / no support. Pt w/ LOB without support, cues to correct. Assessment   Prognosis Fair   Problem List Decreased strength;Decreased endurance; Impaired balance;Decreased mobility; Decreased cognition;Decreased safety awareness; Impaired vision;Pain   Assessment Pt seen for PT treatment session w/ focus on bed mobility training, t/f training, dynamic standing balance instruction, + gait training. Pt demonstrated progress this session w/ decreased assistance for bed mobility, t/f, ambulation, + increased ambulation distance. Pt overall limited from further progress by headache + orthostatic hypotension. Pt w/ impaired balance w/ ADL tasks as well. Continue to recommend rehab upon d/c to address above-stated problem list.   Goals   Patient Goals be able to walk to the bathroom more easily   PT Treatment Day 1   Plan   Treatment/Interventions Functional transfer training;LE strengthening/ROM; Therapeutic exercise; Endurance training;Elevations; Patient/family training;Equipment eval/education; Bed mobility;Gait training; Compensatory technique education;Spoke to nursing;OT   Progress Progressing toward goals   PT Frequency 3-5x/wk   Discharge Recommendation   PT Discharge Recommendation Post acute rehabilitation services   AM-PAC Basic Mobility Inpatient   Turning in Flat Bed Without Bedrails 3   Lying on Back to Sitting on Edge of Flat Bed Without Bedrails 3   Moving Bed to Chair 3   Standing Up From Chair Using Arms 3   Walk in Room 3   Climb 3-5 Stairs With Railing 2   Basic Mobility Inpatient Raw Score 17   Basic Mobility Standardized Score 39.67   Highest Level Of Mobility   JH-HLM Goal 5: Stand one or more mins   JH-HLM Achieved 7: Walk 25 feet or more   Education   Education Provided Mobility training;Assistive device   Patient Demonstrates acceptance/verbal understanding;Reinforcement needed   End of Consult   Patient Position at End of Consult Bedside chair;Bed/Chair alarm activated; All needs within reach  (BLE elevated, all lines in tact)       Emy Rivers, PT, DPT

## 2023-10-27 ENCOUNTER — PATIENT OUTREACH (OUTPATIENT)
Dept: CASE MANAGEMENT | Facility: HOSPITAL | Age: 73
End: 2023-10-27

## 2023-10-27 LAB
ANION GAP SERPL CALCULATED.3IONS-SCNC: 11 MMOL/L
BACTERIA BLD CULT: ABNORMAL
BUN SERPL-MCNC: 47 MG/DL (ref 5–25)
CALCIUM SERPL-MCNC: 7.7 MG/DL (ref 8.4–10.2)
CHLORIDE SERPL-SCNC: 103 MMOL/L (ref 96–108)
CO2 SERPL-SCNC: 21 MMOL/L (ref 21–32)
CREAT SERPL-MCNC: 4.66 MG/DL (ref 0.6–1.3)
ERYTHROCYTE [DISTWIDTH] IN BLOOD BY AUTOMATED COUNT: 14.6 % (ref 11.6–15.1)
GLUCOSE SERPL-MCNC: 116 MG/DL (ref 65–140)
GLUCOSE SERPL-MCNC: 139 MG/DL (ref 65–140)
GLUCOSE SERPL-MCNC: 150 MG/DL (ref 65–140)
GLUCOSE SERPL-MCNC: 199 MG/DL (ref 65–140)
GLUCOSE SERPL-MCNC: 211 MG/DL (ref 65–140)
GRAM STN SPEC: ABNORMAL
HCT VFR BLD AUTO: 26.2 % (ref 36.5–46.1)
HGB BLD-MCNC: 8.9 G/DL (ref 12–15.4)
MAGNESIUM SERPL-MCNC: 1.4 MG/DL (ref 1.9–2.7)
MCH RBC QN AUTO: 25.3 PG (ref 26.8–34.3)
MCHC RBC AUTO-ENTMCNC: 34 G/DL (ref 31.4–37.4)
MCV RBC AUTO: 74 FL (ref 82–98)
PHOSPHATE SERPL-MCNC: 3 MG/DL (ref 2.3–4.1)
PLATELET # BLD AUTO: 94 THOUSANDS/UL (ref 149–390)
PMV BLD AUTO: 11.9 FL (ref 8.9–12.7)
POTASSIUM SERPL-SCNC: 3.7 MMOL/L (ref 3.5–5.3)
RBC # BLD AUTO: 3.52 MILLION/UL (ref 3.88–5.12)
S AUREUS DNA BLD POS QL NAA+NON-PROBE: DETECTED
SODIUM SERPL-SCNC: 135 MMOL/L (ref 135–147)
TACROLIMUS BLD-MCNC: 7.5 NG/ML (ref 3–15)
WBC # BLD AUTO: 6.99 THOUSAND/UL (ref 4.31–10.16)

## 2023-10-27 PROCEDURE — 85027 COMPLETE CBC AUTOMATED: CPT | Performed by: INTERNAL MEDICINE

## 2023-10-27 PROCEDURE — 82948 REAGENT STRIP/BLOOD GLUCOSE: CPT

## 2023-10-27 PROCEDURE — 99232 SBSQ HOSP IP/OBS MODERATE 35: CPT | Performed by: STUDENT IN AN ORGANIZED HEALTH CARE EDUCATION/TRAINING PROGRAM

## 2023-10-27 PROCEDURE — 84100 ASSAY OF PHOSPHORUS: CPT | Performed by: INTERNAL MEDICINE

## 2023-10-27 PROCEDURE — 87040 BLOOD CULTURE FOR BACTERIA: CPT

## 2023-10-27 PROCEDURE — 99233 SBSQ HOSP IP/OBS HIGH 50: CPT | Performed by: STUDENT IN AN ORGANIZED HEALTH CARE EDUCATION/TRAINING PROGRAM

## 2023-10-27 PROCEDURE — 83735 ASSAY OF MAGNESIUM: CPT | Performed by: INTERNAL MEDICINE

## 2023-10-27 PROCEDURE — 80048 BASIC METABOLIC PNL TOTAL CA: CPT | Performed by: INTERNAL MEDICINE

## 2023-10-27 PROCEDURE — 99232 SBSQ HOSP IP/OBS MODERATE 35: CPT | Performed by: INTERNAL MEDICINE

## 2023-10-27 RX ORDER — MAGNESIUM SULFATE 1 G/100ML
1 INJECTION INTRAVENOUS ONCE
Status: COMPLETED | OUTPATIENT
Start: 2023-10-27 | End: 2023-10-27

## 2023-10-27 RX ADMIN — SUCRALFATE 1 G: 1 TABLET ORAL at 16:06

## 2023-10-27 RX ADMIN — TACROLIMUS 1 MG: 1 CAPSULE ORAL at 11:12

## 2023-10-27 RX ADMIN — SUCRALFATE 1 G: 1 TABLET ORAL at 20:16

## 2023-10-27 RX ADMIN — HEPARIN SODIUM 5000 UNITS: 5000 INJECTION INTRAVENOUS; SUBCUTANEOUS at 13:11

## 2023-10-27 RX ADMIN — ACETAMINOPHEN 650 MG: 325 TABLET, FILM COATED ORAL at 20:16

## 2023-10-27 RX ADMIN — ZOLPIDEM TARTRATE 5 MG: 5 TABLET ORAL at 20:16

## 2023-10-27 RX ADMIN — MAGNESIUM SULFATE HEPTAHYDRATE 1 G: 1 INJECTION, SOLUTION INTRAVENOUS at 09:22

## 2023-10-27 RX ADMIN — SUCRALFATE 1 G: 1 TABLET ORAL at 06:37

## 2023-10-27 RX ADMIN — CEFAZOLIN SODIUM 1000 MG: 1 SOLUTION INTRAVENOUS at 02:53

## 2023-10-27 RX ADMIN — NORTRIPTYLINE HYDROCHLORIDE 10 MG: 10 CAPSULE ORAL at 20:15

## 2023-10-27 RX ADMIN — SODIUM BICARBONATE 650 MG TABLET 650 MG: at 09:23

## 2023-10-27 RX ADMIN — INSULIN GLARGINE 10 UNITS: 100 INJECTION, SOLUTION SUBCUTANEOUS at 21:53

## 2023-10-27 RX ADMIN — DOCUSATE SODIUM 100 MG: 100 CAPSULE ORAL at 09:23

## 2023-10-27 RX ADMIN — OXYBUTYNIN CHLORIDE 5 MG: 5 TABLET ORAL at 18:10

## 2023-10-27 RX ADMIN — SODIUM BICARBONATE 650 MG TABLET 650 MG: at 18:10

## 2023-10-27 RX ADMIN — OXYBUTYNIN CHLORIDE 5 MG: 5 TABLET ORAL at 09:23

## 2023-10-27 RX ADMIN — INSULIN LISPRO 3 UNITS: 100 INJECTION, SOLUTION INTRAVENOUS; SUBCUTANEOUS at 13:16

## 2023-10-27 RX ADMIN — TACROLIMUS 1 MG: 1 CAPSULE ORAL at 20:16

## 2023-10-27 RX ADMIN — PANTOPRAZOLE SODIUM 40 MG: 40 TABLET, DELAYED RELEASE ORAL at 09:23

## 2023-10-27 RX ADMIN — PREGABALIN 75 MG: 50 CAPSULE ORAL at 09:23

## 2023-10-27 RX ADMIN — HEPARIN SODIUM 5000 UNITS: 5000 INJECTION INTRAVENOUS; SUBCUTANEOUS at 05:18

## 2023-10-27 RX ADMIN — CLOPIDOGREL BISULFATE 75 MG: 75 TABLET ORAL at 09:23

## 2023-10-27 RX ADMIN — ATORVASTATIN CALCIUM 80 MG: 80 TABLET, FILM COATED ORAL at 16:06

## 2023-10-27 RX ADMIN — INSULIN LISPRO 1 UNITS: 100 INJECTION, SOLUTION INTRAVENOUS; SUBCUTANEOUS at 18:13

## 2023-10-27 RX ADMIN — SODIUM CHLORIDE, SODIUM GLUCONATE, SODIUM ACETATE, POTASSIUM CHLORIDE, MAGNESIUM CHLORIDE, SODIUM PHOSPHATE, DIBASIC, AND POTASSIUM PHOSPHATE 50 ML/HR: .53; .5; .37; .037; .03; .012; .00082 INJECTION, SOLUTION INTRAVENOUS at 21:58

## 2023-10-27 RX ADMIN — TAMSULOSIN HYDROCHLORIDE 0.4 MG: 0.4 CAPSULE ORAL at 16:06

## 2023-10-27 RX ADMIN — HEPARIN SODIUM 5000 UNITS: 5000 INJECTION INTRAVENOUS; SUBCUTANEOUS at 20:15

## 2023-10-27 RX ADMIN — INSULIN LISPRO 3 UNITS: 100 INJECTION, SOLUTION INTRAVENOUS; SUBCUTANEOUS at 18:13

## 2023-10-27 RX ADMIN — SUCRALFATE 1 G: 1 TABLET ORAL at 11:12

## 2023-10-27 RX ADMIN — DOCUSATE SODIUM 100 MG: 100 CAPSULE ORAL at 18:15

## 2023-10-27 RX ADMIN — PREGABALIN 75 MG: 50 CAPSULE ORAL at 18:10

## 2023-10-27 RX ADMIN — INSULIN LISPRO 1 UNITS: 100 INJECTION, SOLUTION INTRAVENOUS; SUBCUTANEOUS at 13:11

## 2023-10-27 RX ADMIN — SODIUM CHLORIDE, SODIUM GLUCONATE, SODIUM ACETATE, POTASSIUM CHLORIDE, MAGNESIUM CHLORIDE, SODIUM PHOSPHATE, DIBASIC, AND POTASSIUM PHOSPHATE 50 ML/HR: .53; .5; .37; .037; .03; .012; .00082 INJECTION, SOLUTION INTRAVENOUS at 02:52

## 2023-10-27 NOTE — PROGRESS NOTES
[address]  Progress Note  Name: Inna Genao  MRN: [id number]  Unit/Bed#: ZU1 870-01 I Date of Admission: 10/23/2023   Date of Service: 10/27/2023 I Hospital Day: 4    Assessment/Plan   * Fall  Assessment & Plan  Past medical history of vertigo, dementia and cirrhosis presenting after multiple falls over last 2 days. After discussion with family, patient has been dizzy for several weeks with poor oral intake along with complaints of urinary frequency and dysuria. Meclizine as needed  Obtain orthostatic vitals  Work-up revealed UTI and bacteremia, treatment as below  PT/OT    MSSA bacteremia  Assessment & Plan  Plan as above  D/w with ID that due to no plans for chemotherapy that his port should be removed. Called his wife to discuss removal of the port but she was not available today, will try again tomorrow    UTI (urinary tract infection)  Assessment & Plan  History of UTIs  Patient denies any dysuria however family reports that he has been complaining of dysuria for the last few days with mild confusion  UA with signs of infection  CT A/P with right renal atrophy, moderate right hydronephrosis with right ureteral stent. There is mild diffuse right urothelial thickening and enhancement. There is also mild diffuse thickening of the urinary bladder and adjacent fat stranding. This may be explained by infectious etiology including right ureteritis and cystitis.   Prior CT scan reviewed this similar ureteritis findings however cystitis appears to be new  Bcx negative for growth to date  Uine culture is positive for alpha step and MSSA, continue cefazolin  Per urology no indication for stent change  Monitor temps, WBCs  Repeat cultures are pending    Acute on chronic renal failure   Assessment & Plan  History of CKD with baseline creatinine of 3.3-3.6  Likely TAVON on CKD due to poor po intake and possible contrast nephropathy  Presenting with creatinine of 4.2  Not meeting criteria for TAVON though likely elevated secondary to underlying CKD and poor oral intake  Received IV contrast for trauma scans  IVF hydration  Avoid nephrotoxic agents and relative hypotension  Nephrology following, no indication to do HD at this time.   Renal function is slowly improving, will continue to monitor closely    Anemia  Assessment & Plan  Chronic anemia likely due to CKD and multiple blood draws  Likely worsened due to acute infection  Check anemia studies and transfuse 1 unit of PBRCS 10/25  Blood consent obtained    History of CVA (cerebrovascular accident)  Assessment & Plan  Remote history of CVA  Continue Plavix    Thrombocytopenia (HCC)  Assessment & Plan  Lab Results   Component Value Date    PLT 94 (L) 10/27/2023     Chronic at baseline    CKD (chronic kidney disease)  Assessment & Plan  Lab Results   Component Value Date    CREATININE 4.66 (H) 10/27/2023    CREATININE 4.85 (H) 10/26/2023    CREATININE 5.01 (H) 10/25/2023   Plan as above    Dementia ([address])  Assessment & Plan  A&O x4  Outpatient follow-up  Delirium precautions    T2DM (type 2 diabetes mellitus) ([address])  Assessment & Plan  No results found for: "HGBA1C"    Recent Labs     10/26/23  1619 10/26/23  2133 10/27/23  0801 10/27/23  1147   POCGLU 253* 215* 139 211*         Blood Sugar Average: Last 72 hrs:  (P) 229.25  Not on any oral agents  SSI while inpatient  Monitor glucose closely  Prandial and basal insulin added    History of liver transplant ([address])  Assessment & Plan  History of liver transplant secondary to alcoholic cirrhosis  Continue tacrolimus 1 mg every 12 hours  Follow-up with GI outpatient    Insomnia  Assessment & Plan  Continue temazepam and Ambien which patient has been on for 30 years  Confirmed with PDMP    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 Patient Centered Rounds: I performed bedside rounds with nursing staff today. Discussions with Specialists or Other Care Team Provider: nurseANNE    Education and Discussions with Family / Patient: Attempted to update  (wife) via phone. Unable to contact. Total Time Spent on Date of Encounter in care of patient: 45 mins. This time was spent on one or more of the following: performing physical exam; counseling and coordination of care; obtaining or reviewing history; documenting in the medical record; reviewing/ordering tests, medications or procedures; communicating with other healthcare professionals and discussing with patient's family/caregivers. Current Length of Stay: 4 day(s)  Current Patient Status: Inpatient   Certification Statement: The patient will continue to require additional inpatient hospital stay due to bacteremia  Discharge Plan: Anticipate discharge in >72 hrs to home with home services. Code Status: Level 3 - DNAR and DNI    Subjective:   Denies any new complaints    Objective:     Vitals:   Temp (24hrs), Av.2 °F (36.8 °C), Min:98 °F (36.7 °C), Max:98.4 °F (36.9 °C)    Temp:  [98 °F (36.7 °C)-98.4 °F (36.9 °C)] 98.4 °F (36.9 °C)  HR:  [101-114] 114  BP: (110-132)/(66-72) 110/66  SpO2:  [97 %-98 %] 98 %  Body mass index is 22.18 kg/m². Input and Output Summary (last 24 hours): Intake/Output Summary (Last 24 hours) at 10/27/2023 1640  Last data filed at 10/27/2023 1225  Gross per 24 hour   Intake 470 ml   Output 920 ml   Net -450 ml       Physical Exam:   Physical Exam  Constitutional:       Appearance: Normal appearance. HENT:      Head: Normocephalic and atraumatic.       Nose: Nose normal.   Eyes:      Extraocular Movements: Extraocular movements intact. Cardiovascular:      Rate and Rhythm: Normal rate and regular rhythm. Pulmonary:      Effort: Pulmonary effort is normal.      Breath sounds: No wheezing or rales. Musculoskeletal:      Right lower leg: No edema. Left lower leg: No edema. Skin:     General: Skin is warm and dry. Neurological:      Mental Status: Carol Robledo is alert and oriented to person, place, and time. Mental status is at baseline. Psychiatric:         Mood and Affect: Mood normal.         Behavior: Behavior normal.          Additional Data:     Labs:  Results from last 7 days   Lab Units 10/27/23  0526 10/26/23  0636   WBC Thousand/uL 6.99 5.54   HEMOGLOBIN g/dL 8.9* 8.1*   HEMATOCRIT % 26.2* 24.0*   PLATELETS Thousands/uL 94* 75*   NEUTROS PCT %  --  77*   LYMPHS PCT %  --  11*   MONOS PCT %  --  9   EOS PCT %  --  2     Results from last 7 days   Lab Units 10/27/23  0526 10/24/23  0534 10/23/23  1133   SODIUM mmol/L 135   < > 135   POTASSIUM mmol/L 3.7   < > 4.5   CHLORIDE mmol/L 103   < > 108   CO2 mmol/L 21   < > 16*   BUN mg/dL 47*   < > 56*   CREATININE mg/dL 4.66*   < > 4.25*   ANION GAP mmol/L 11   < > 11   CALCIUM mg/dL 7.7*   < > 7.7*   ALBUMIN g/dL  --   --  3.3*   TOTAL BILIRUBIN mg/dL  --   --  0.44   ALK PHOS U/L  --   --  59   ALT U/L  --   --  16   AST U/L  --   --  37   GLUCOSE RANDOM mg/dL 116   < > 188*    < > = values in this interval not displayed.          Results from last 7 days   Lab Units 10/27/23  1147 10/27/23  0801 10/26/23  2133 10/26/23  1619 10/26/23  1129 10/26/23  0819 10/25/23  1655 10/25/23  1114 10/25/23  0543 10/24/23  1618 10/24/23  1116 10/24/23  0757   POC GLUCOSE mg/dl 211* 139 215* 253* 274* 298* 288* 190* 201* 272* 212* 198*         Results from last 7 days   Lab Units 10/26/23  0154   PROCALCITONIN ng/ml 4.11*       Lines/Drains:  Invasive Devices       Central Venous Catheter Line  Duration             Port A Cath Right -- days                    Central Line:  Goal for removal: Will discontinue when peripheral access obtained. Imaging: No pertinent imaging reviewed. Recent Cultures (last 7 days):   Results from last 7 days   Lab Units 10/26/23  0155 10/23/23  2216 10/23/23  1534   BLOOD CULTURE  No Growth at 24 hrs.  Staphylococcus aureus*  --    GRAM STAIN RESULT   --  Gram positive cocci in clusters*  --    URINE CULTURE   --   --  60,000-69,000 cfu/ml Staphylococcus aureus*  30,000-39,000 cfu/ml Alpha Hemolytic Streptococcus NOT Enterococcus*       Last 24 Hours Medication List:   Current Facility-Administered Medications   Medication Dose Route Frequency Provider Last Rate    acetaminophen  650 mg Oral Q6H PRN Cele Verdugo, DO      atorvastatin  80 mg Oral Daily With [employer], DO      cefazolin  1,000 mg Intravenous Q24H LUCY Ledbetter 1,000 mg (10/27/23 0253)    clopidogrel  75 mg Oral Daily Cele Verdugo, DO      docusate sodium  100 mg Oral BID Kvng Thapa MD      heparin (porcine)  5,000 Units Subcutaneous Q8H [address] Cele Verdugo, DO      insulin glargine  10 Units Subcutaneous HS Kvng Thapa MD      insulin lispro  1-5 Units Subcutaneous TID AC Cele Verdugo, DO      insulin lispro  3 Units Subcutaneous TID With Meals Kvng Thapa MD      meclizine  25 mg Oral Q8H PRN Kasie Fossa, DO      multi-electrolyte  50 mL/hr Intravenous Continuous Darcie Rodriguez MD 50 mL/hr (10/27/23 0252)    nortriptyline  10 mg Oral HS Cele Verdugo, DO      oxybutynin  5 mg Oral BID Cele Verdugo, DO      oxyCODONE  2.5 mg Oral Q4H PRN Kvng Thapa MD      pantoprazole  40 mg Oral Daily Cele Verdugo, DO      pregabalin  75 mg Oral BID Cele Verdugo, DO      senna  2 tablet Oral Once Kvng Thapa MD      sodium bicarbonate  650 mg Oral BID after meals Darcie Rodriguez MD      sucralfate  1 g Oral 4x Daily (AC & HS) Cele Verdugo,       tacrolimus  1 mg Oral Q12H [facility] Cele Verdugo,       tamsulosin  0.4 mg Oral Daily With [employer], DO      temazepam  15 mg Oral HS PRN Cele Verdugo DO      zolpidem  5 mg Oral HS PRN Angel Phalen, DO          Today, Patient Was Seen By: Niecy Rashid MD    **Please Note: This note may have been constructed using a voice recognition system. **

## 2023-10-27 NOTE — PROGRESS NOTES
NEPHROLOGY PROGRESS NOTE   Serafin Spurling 74 y.o. adult MRN: [id number]  Unit/Bed#: YQ6 870-01 Encounter: [id number]  Reason for Consult: TAVON    ASSESSMENT AND PLAN:  74 y.o. 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 Admitted after a fall . Nephrology is consulted for management of TAVON      PLAN     #Non-Oliguric KDIGO TAVON stage 1 on CKD G4 versus progression of CKD  Etiology: Likely secondary to ATN in the settings of recurrent TAVON with progression of CKD. Worsening kidney function in the settings of contrast associated nephropathy   Baseline creatinine: 3.6 mg/dL in August  Peak creatinine: 5.01  Current creatinine: 4.66 mg/dL slowly trending down  UA: No hematuria, leukocyturia  Renal imaging :   Right renal atrophy with hydronephrosis, right double-J stent. urothelial thickening. No left hydronephrosis  Treatment:  Kidney function stable, no indication of dialysis  Maintain MAP:  Over 65 mmHg if possible/avoid hypoperfusion:  Hold parameters on blood pressure medications  Avoid nephrotoxic agents such as NSAIDs, and IV contrast if possible. Avoid opioids   Patient will require follow-up with nephrology on discharge     #CKD G4/5  Baseline creatinine: 3.6 mg/dL in August  Etiology: Likely secondary to ATN in the settings of recurrent TAVON with component of obstructive uropathy and nephron loss with atrophic kidney     Dialysis discussion with patient on 10/25 and 10/26: I informed patient of worsening kidney function. We talked about dialysis benefits and risk. He had a brief conversation about dialysis with primary nephrologist.  Patient is not sure if he wants to proceed with dialysis and wants to talk with his family about it. No urgent indication of dialysis today.   I offered to call family and discussed dialysis with them but patient prefers to talk to them personally        #Acid-base Disorder  serum HCO3 21mmol/L, goal> 21  Metabolic acidosis secondary to kidney disease  continue sodium bicarbonate 650 twice daily     #Volume status/hypertension:  Volume: Euvolemic on exam, eating and drinking well  Blood pressure: Normotensive, /66mmhg   <140/90  Recommend:  Low-sodium diet   enforce fluid intake  Status post IV fluids  Monitor urinary output     #Fever  Chest x-ray infiltration  On cefazolin     #Anemia:  Current hemoglobin: 8.9 mg/dL  Treatment:  Transfuse for hemoglobin less than 7.0 per primary service       #Fall  Dizziness, on meclizine   PT OT        #Liver transplant   on tacrolimus twice daily  Tacrolimus levels pending    SUBJECTIVE:  Patient seen and examined at bedside. No chest pain, shortness of breath, nausea, vomiting, abdominal pain or diarrhea.      OBJECTIVE:  Current Weight: Weight - Scale: 56.8 kg (125 lb 3.5 oz)  Vitals:    10/27/23 0857   BP: 110/66   Pulse: (!) 114   Resp:    Temp: 98.4 °F (36.9 °C)   SpO2:        Intake/Output Summary (Last 24 hours) at 10/27/2023 3204  Last data filed at 10/27/2023 0391  Gross per 24 hour   Intake 470 ml   Output 920 ml   Net -450 ml     Wt Readings from Last 3 Encounters:   10/27/23 56.8 kg (125 lb 3.5 oz)     Temp Readings from Last 3 Encounters:   10/27/23 98.4 °F (36.9 °C) (Oral)     BP Readings from Last 3 Encounters:   10/27/23 110/66     Pulse Readings from Last 3 Encounters:   10/27/23 (!) 114      General:  no acute distress at this time  Skin:  No acute rash  Eyes:  No scleral icterus and noninjected  ENT:  mucous membranes moist  Neck:  no carotid bruits  Chest:  Clear to auscultation percussion, good respiratory effort, no use of accessory respiratory muscles  CVS:  Regular rate and rhythm without rub   Abdomen:  soft and nontender   Extremities: no significant lower extremity edema  Neuro:  No gross focality  Psych:  Alert , cooperative Medications:    Current Facility-Administered Medications:     acetaminophen (TYLENOL) tablet 650 mg, 650 mg, Oral, Q6H PRN, Cele Verdugo DO, 650 mg at 10/26/23 1122    atorvastatin (LIPITOR) tablet 80 mg, 80 mg, Oral, Daily With Dinner, [employer], DO, 80 mg at 10/26/23 1809    ceFAZolin (ANCEF) IVPB (premix in dextrose) 1,000 mg 50 mL, 1,000 mg, Intravenous, Q24H, LUCY Ledbetter, Last Rate: 100 mL/hr at 10/27/23 0253, 1,000 mg at 10/27/23 0253    clopidogrel (PLAVIX) tablet 75 mg, 75 mg, Oral, Daily, Cele Verdugo DO, 75 mg at 10/27/23 6644    docusate sodium (COLACE) capsule 100 mg, 100 mg, Oral, BID, Knvg Thapa MD, 100 mg at 10/27/23 0923    heparin (porcine) subcutaneous injection 5,000 Units, 5,000 Units, Subcutaneous, Q8H [address], Cele Verdugo DO, 5,000 Units at 10/27/23 0518    insulin glargine (LANTUS) subcutaneous injection 10 Units 0.1 mL, 10 Units, Subcutaneous, HS, Kvng Thapa MD, 10 Units at 10/26/23 2146    insulin lispro (HumaLOG) 100 units/mL subcutaneous injection 1-5 Units, 1-5 Units, Subcutaneous, TID AC, 2 Units at 10/26/23 1834 **AND** Fingerstick Glucose (POCT), , , TID AC, Cele Verdugo DO    insulin lispro (HumaLOG) 100 units/mL subcutaneous injection 3 Units, 3 Units, Subcutaneous, TID With Meals, Kvng Thapa MD, 3 Units at 10/26/23 1835    magnesium sulfate IVPB (premix) SOLN 1 g, 1 g, Intravenous, Once, LUCY Duke, 1 g at 10/27/23 6575    meclizine (ANTIVERT) tablet 25 mg, 25 mg, Oral, Q8H PRN, Cele Verdugo DO    multi-electrolyte (PLASMALYTE-A/ISOLYTE-S PH 7.4) IV solution, 50 mL/hr, Intravenous, Continuous, Margarita Carl MD, Last Rate: 50 mL/hr at 10/27/23 0252, 50 mL/hr at 10/27/23 0252    nortriptyline (PAMELOR) capsule 10 mg, 10 mg, Oral, HS, Cele Verdugo DO, 10 mg at 10/26/23 2026    oxybutynin (DITROPAN) tablet 5 mg, 5 mg, Oral, BID, Cele Verdugo DO, 5 mg at 10/27/23 4282 oxyCODONE (ROXICODONE) split tablet 2.5 mg, 2.5 mg, Oral, Q4H PRN, Divya Martínez MD, 2.5 mg at 10/26/23 1406    pantoprazole (PROTONIX) EC tablet 40 mg, 40 mg, Oral, Daily, Cele De La Fuenteel, DO, 40 mg at 10/27/23 0923    pregabalin (LYRICA) capsule 75 mg, 75 mg, Oral, BID, Cele De La Fuenteel, DO, 75 mg at 10/27/23 1186    senna (SENOKOT) tablet 17.2 mg, 2 tablet, Oral, Once, Divya Martínez MD    sodium bicarbonate tablet 650 mg, 650 mg, Oral, BID after meals, All Bond MD, 650 mg at 10/27/23 0923    sucralfate (CARAFATE) tablet 1 g, 1 g, Oral, 4x Daily (AC & HS), Cele De La Fuenteel, DO, 1 g at 10/27/23 0926    tacrolimus (PROGRAF) capsule 1 mg, 1 mg, Oral, Q12H [facility], Cele De La Fuenteel, DO, 1 mg at 10/26/23 2027    tamsulosin (FLOMAX) capsule 0.4 mg, 0.4 mg, Oral, Daily With Dinner, Reymundo Chavez DO, 0.4 mg at 10/26/23 1809    temazepam (RESTORIL) capsule 15 mg, 15 mg, Oral, HS PRN, Cele Verdugo, DO, 15 mg at 10/25/23 2202    zolpidem (AMBIEN) tablet 5 mg, 5 mg, Oral, HS PRN, Reymundo Chavez DO, 5 mg at 10/26/23 2147    Laboratory Results:  Results from last 7 days   Lab Units 10/27/23  0526 10/26/23  0636 10/26/23  0154 10/25/23  0554 10/24/23  0534 10/23/23  1133   WBC Thousand/uL 6.99 5.54 6.08 5.96 6.24 8.06   HEMOGLOBIN g/dL 8.9* 8.1* 8.5* 6.9* 7.5* 8.0*   HEMATOCRIT % 26.2* 24.0* 26.0* 21.0* 23.9* 25.7*   PLATELETS Thousands/uL 94* 75* 82* 74* 72* 68*   SODIUM mmol/L 135 134*  --  134* 134* 135   POTASSIUM mmol/L 3.7 3.9  --  4.1 4.4 4.5   CHLORIDE mmol/L 103 104  --  104 104 108   CO2 mmol/L 21 20*  --  20* 20* 16*   BUN mg/dL 47* 50*  --  55* 52* 56*   CREATININE mg/dL 4.66* 4.85*  --  5.01* 4.20* 4.25*   CALCIUM mg/dL 7.7* 7.0*  --  7.2* 7.2* 7.7*   MAGNESIUM mg/dL 1.4*  --   --   --   --   --    PHOSPHORUS mg/dL 3.0  --   --   --   --   --        XR chest portable   Final Result by Otilia Vargas MD (10/26 7014)      Mild opacity at the left base with loss of the left diaphragm. This could be due to atelectasis but pneumonia not excluded in the appropriate clinical setting. Bilateral lung metastases. Workstation performed: [id number]         CT abdomen pelvis with contrast   Final Result by Laura Kruse MD (10/23 2219)      1. Redemonstration of right renal atrophy and moderate right hydronephrosis with a double-J right ureteral stent. There is mild diffuse right urothelial thickening and enhancement. There is also mild diffuse thickening of the urinary bladder and adjacent    fat stranding. This may be explained by an infectious etiology including right ureteritis and cystitis. 2. Skin thickening and/or subcutaneous edema in the periumbilical area may be due to history of trauma. Workstation performed: [id number]         CT head without contrast   Final Result by Tere Lugo DO (10/23 1300)   Addendum (preliminary) 1 of 1 by Tere Lugo DO (10/23 1300)   ADDENDUM:   Prior from July 31, 2023 performed under separate medical record number. Prior left-sided aneurysm clipping and local encephalomalacia is    unchanged. I personally discussed the CT brain and C-spine with Clarence    on 10/23/2023 1:00 PM.            Final   No acute intracranial abnormality. Workstation performed: [id number]         CT spine cervical without contrast   Final Result by Tere Lugo DO (10/23 1253)   No cervical spine fracture or traumatic malalignment. Workstation performed: [id number]         XR trauma multiple   Final Result by Jacob Mccray MD (10/23 8082)      Spiculated mass in the right upper lobe, little changed since 8/1/2023. Interval increase in size of bilateral pulmonary metastases. No evidence of acute traumatic abnormality in the chest.      The study was marked in [facility] for immediate notification.          Workstation performed: [id number]         XR chest portable   Final Result by Chante Kern MD (10/24 2586)      Spiculated mass in the right upper lobe, little changed since 8/1/2023. Interval increase in size of bilateral pulmonary metastases. No evidence of acute traumatic abnormality in the chest.      The study was marked in [address] [place] [address] for immediate notification. Workstation performed: [id number]         XR chest pa & lateral    (Results Pending)       Portions of the record may have been created with voice recognition software. Occasional wrong word or "sound a like" substitutions may have occurred due to the inherent limitations of voice recognition software. Read the chart carefully and recognize, using context, where substitutions have occurred.

## 2023-10-27 NOTE — PLAN OF CARE
Patient has no change in assessment, and continues on IVF.         Problem: Potential for Falls  Goal: Patient will remain free of falls  Description: INTERVENTIONS:  - Educate patient/family on patient safety including physical limitations  - Instruct patient to call for assistance with activity   - Consult OT/PT to assist with strengthening/mobility   - Keep Call bell within reach  - Keep bed low and locked with side rails adjusted as appropriate  - Keep care items and personal belongings within reach  - Initiate and maintain comfort rounds  - Make Fall Risk Sign visible to staff  - Offer Toileting every 2 Hours, in advance of need  - Initiate/Maintain chair/bed alarm  - Apply yellow socks and bracelet for high fall risk patients  - Consider moving patient to room near nurses station  Outcome: Progressing     Problem: MOBILITY - ADULT  Goal: Maintain or return to baseline ADL function  Description: INTERVENTIONS:  -  Assess patient's ability to carry out ADLs; assess patient's baseline for ADL function and identify physical deficits which impact ability to perform ADLs (bathing, care of mouth/teeth, toileting, grooming, dressing, etc.)  - Assess/evaluate cause of self-care deficits   - Assess range of motion  - Assess patient's mobility; develop plan if impaired  - Assess patient's need for assistive devices and provide as appropriate  - Encourage maximum independence but intervene and supervise when necessary  - Involve family in performance of ADLs  - Assess for home care needs following discharge   - Consider OT consult to assist with ADL evaluation and planning for discharge  - Provide patient education as appropriate  Outcome: Progressing

## 2023-10-27 NOTE — PROGRESS NOTES
OSW called Cammy Canales today for scheduled outreach. Cammy Canales informed Kerry Schwartz is currently in the hospital. OSW did not receive an ADT alert, but noted pt's chart is marked for merge which is why the alert may not have gone through. Cammy Canales stated he fell x2 at home, and the second time she had to have her neighbor who is a  come over to help him up. He had been complaining of dizziness and had exhibited some confusion. She stated he has sepsis and a "bad infection" in his kidneys. During conversation she received another call. She excused herself to answer. When she returned she stated Maria Guadalupe Gotti is recommended to go to STR at [facility]. She will look at the list and see where he may be able to go. She is worried about her daughter, Shelby Landis who has intellectual disabilities and doesn't understand why "Richi" is in the hospital for so long. She will need to talk to her about her father going to rehab. Discussed that she can visit him and maybe participate in some of the activities the facility has with him. Provided emotional support and active listening. Will call Cammy Canales in a few weeks.

## 2023-10-27 NOTE — PROGRESS NOTES
Progress Note - Infectious Disease   Bev Duncan 74 y.o. adult MRN: [id number]  Unit/Bed#: NW8 870-01 Encounter: [id number]      Impression/Plan:    1. Ascending urinary tract infection. In the setting of #5 below and internal right ureteral stent. Patient with reported dysuria prior to admission, UA with innumerable white blood cells. Urine culture is growing MSSA and there is low colony count of alpha hemolytic strep. CT A/P shows moderate right hydronephrosis, right urothelial thickening and enhancement and bladder wall thickening concerning for a sending ureteritis and cystitis. Patient is refusing Mcallister catheter placement for decompression              -Continue IV cefazolin 1g every 24 hours, renally dose adjusted              -Follow-up blood cultures              -Urology follow-up, no indication for stent exchange              -Patient refusing Mcallister catheter placement for urinary decompression     2. MSSA bacteremia. Due to #1 above. While Staphylococcus aureus isolated in the urine is likely due to seeding from a bloodstream infection, in this case a UTI seems to be the primary cause of his bacteremia. Risk factors for isolation of staph in the urine include prior urinary tract manipulation and his chronic stent. He has no open skin wounds or signs of SSTI. The patient does have a Port-A-Cath in place which has not been used for medication infusion outpatient. TTE no vegetations              -Continue IV cefazolin              -Follow-up pending blood cultures, repeat blood culture set               -The patient is not on chemotherapy but has a Port-A-Cath in place. While Port salvage can be attempted if patient refuses removal, since it does not appear that he has an indication for a chronic port, in the setting of Staphylococcus aureus bacteremia recommend removal     3. Sepsis, evolving since admission with fever and tachycardia. Due to #1 and 2 above.   Also consider contribution of malignancy.              -Antibiotics as above              -Follow-up blood cultures              -Monitor fever curve, white blood cell count     4. 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 Likely secondary to ATN and hydronephrosis. Prior baseline creatinine 3.6 but elevated to 4.25 on admission. Patient has refused dialysis              -Nephrology follow-up ongoing              -Dose adjust antibiotics for creatinine clearance     5. Advanced bladder cancer status post TURBT May 7823 complicated by right ureteral obstruction status post right internal ureteral stent. The patient has metastatic disease to the lungs. He previously underwent radiation and chemotherapy but is no longer on cancer directed therapy.              -Urology follow-up              -Palliative care follow-up outpatient     6. History of liver transplant in . Due to alcohol abuse and hepatitis C. Patient currently on tacrolimus.              -Follow-up tacrolimus level     7. Type 2 diabetes mellitus with hyperglycemia. Recent hemoglobin A1c 7.3%. This is a risk factor for infection. Glycemic control per primary. 8.  Thrombocytopenia, chronic. Monitor platelets     I have discussed the above management plan in detail with the primary service and patient. ID will see again 10/30/2023, please call with questions    Antibiotics:  IV cefazolin day 2    Subjective: The patient reports persistent cough but is otherwise feeling okay. He denies abdominal pain, chest pain, shortness of breath, fever, chills. Objective:  Vitals:  Temp:  [98 °F (36.7 °C)-98.6 °F (37 °C)] 98.4 °F (36.9 °C)  HR:  [] 114  Resp:  [25] 25  BP: (100-132)/(66-72) 110/66  SpO2:  [97 %-98 %] 98 %  Temp (24hrs), Av.3 °F (36.8 °C), Min:98 °F (36.7 °C), Max:98.6 °F (37 °C)  Current: Temperature: 98.4 °F (36.9 °C)    Physical Exam:   General Appearance:  Frail appearing but nontoxic, no acute distress. Throat: Oropharynx moist without lesions.     Lungs:   Clear to auscultation bilaterally; no wheezes, rhonchi or rales; respirations unlabored   Heart:  RRR; no murmur, rub or gallop   Abdomen:   Soft, non-tender, non-distended, positive bowel sounds. Extremities: No clubbing, cyanosis or edema   Skin: Right chest wall Port-A-Cath in place, currently accessed       Labs: All pertinent labs and imaging studies were personally reviewed  Results from last 7 days   Lab Units 10/27/23  0526 10/26/23  0636 10/26/23  0154   WBC Thousand/uL 6.99 5.54 6.08   HEMOGLOBIN g/dL 8.9* 8.1* 8.5*   PLATELETS Thousands/uL 94* 75* 82*     Results from last 7 days   Lab Units 10/27/23  0526 10/26/23  0636 10/25/23  0554 10/24/23  0534 10/23/23  1133   SODIUM mmol/L 135 134* 134*   < > 135   POTASSIUM mmol/L 3.7 3.9 4.1   < > 4.5   CHLORIDE mmol/L 103 104 104   < > 108   CO2 mmol/L 21 20* 20*   < > 16*   BUN mg/dL 47* 50* 55*   < > 56*   CREATININE mg/dL 4.66* 4.85* 5.01*   < > 4.25*   CALCIUM mg/dL 7.7* 7.0* 7.2*   < > 7.7*   AST U/L  --   --   --   --  37   ALT U/L  --   --   --   --  16   ALK PHOS U/L  --   --   --   --  59    < > = values in this interval not displayed. Results from last 7 days   Lab Units 10/26/23  0154   PROCALCITONIN ng/ml 4.11*         Results from last 7 days   Lab Units 10/25/23  0554   FERRITIN ng/mL 324*           Micro:  Results from last 7 days   Lab Units 10/26/23  0155 10/23/23  2216 10/23/23  1534   BLOOD CULTURE  No Growth at 24 hrs. Staphylococcus aureus*  --    GRAM STAIN RESULT   --  Gram positive cocci in clusters*  --    URINE CULTURE   --   --  60,000-69,000 cfu/ml Staphylococcus aureus*  30,000-39,000 cfu/ml Alpha Hemolytic Streptococcus NOT Enterococcus*       Imaging:  I have personally reviewed pertinent imaging study reports and images in PACS. CT A/P-right renal atrophy and moderate right hydronephrosis with ureteral stent in place.   Mild diffuse right urothelial thickening and enhancement, thickening of the urinary bladder

## 2023-10-27 NOTE — PLAN OF CARE
Problem: Potential for Falls  Goal: Patient will remain free of falls  Description: INTERVENTIONS:  - Educate patient/family on patient safety including physical limitations  - Instruct patient to call for assistance with activity   - Consult OT/PT to assist with strengthening/mobility   - Keep Call bell within reach  - Keep bed low and locked with side rails adjusted as appropriate  - Keep care items and personal belongings within reach  - Initiate and maintain comfort rounds  - Make Fall Risk Sign visible to staff  - Offer Toileting every 2 Hours, in advance of need  - Initiate/Maintain alarm  - Obtain necessary fall risk management equipment:   - Apply yellow socks and bracelet for high fall risk patients  - Consider moving patient to room near nurses station  Outcome: Progressing     Problem: MOBILITY - ADULT  Goal: Maintain or return to baseline ADL function  Description: INTERVENTIONS:  -  Assess patient's ability to carry out ADLs; assess patient's baseline for ADL function and identify physical deficits which impact ability to perform ADLs (bathing, care of mouth/teeth, toileting, grooming, dressing, etc.)  - Assess/evaluate cause of self-care deficits   - Assess range of motion  - Assess patient's mobility; develop plan if impaired  - Assess patient's need for assistive devices and provide as appropriate  - Encourage maximum independence but intervene and supervise when necessary  - Involve family in performance of ADLs  - Assess for home care needs following discharge   - Consider OT consult to assist with ADL evaluation and planning for discharge  - Provide patient education as appropriate  Outcome: Progressing  Goal: Maintains/Returns to pre admission functional level  Description: INTERVENTIONS:  - Perform BMAT or MOVE assessment daily.   - Set and communicate daily mobility goal to care team and patient/family/caregiver.    - Collaborate with rehabilitation services on mobility goals if consulted  - Perform Range of Motion 3 times a day. - Reposition patient every 2 hours.   - Dangle patient 3 times a day  - Stand patient 3 times a day  - Ambulate patient 3 times a day  - Out of bed to chair 3 times a day   - Out of bed for meals 3 times a day  - Out of bed for toileting  - Record patient progress and toleration of activity level   Outcome: Progressing

## 2023-10-28 LAB
ANION GAP SERPL CALCULATED.3IONS-SCNC: 9 MMOL/L
BUN SERPL-MCNC: 50 MG/DL (ref 5–25)
CALCIUM SERPL-MCNC: 7.4 MG/DL (ref 8.4–10.2)
CHLORIDE SERPL-SCNC: 103 MMOL/L (ref 96–108)
CO2 SERPL-SCNC: 22 MMOL/L (ref 21–32)
CREAT SERPL-MCNC: 4.67 MG/DL (ref 0.6–1.3)
GLUCOSE SERPL-MCNC: 110 MG/DL (ref 65–140)
GLUCOSE SERPL-MCNC: 110 MG/DL (ref 65–140)
GLUCOSE SERPL-MCNC: 116 MG/DL (ref 65–140)
GLUCOSE SERPL-MCNC: 123 MG/DL (ref 65–140)
GLUCOSE SERPL-MCNC: 144 MG/DL (ref 65–140)
GLUCOSE SERPL-MCNC: 99 MG/DL (ref 65–140)
POTASSIUM SERPL-SCNC: 3.7 MMOL/L (ref 3.5–5.3)
SODIUM SERPL-SCNC: 134 MMOL/L (ref 135–147)

## 2023-10-28 PROCEDURE — 87040 BLOOD CULTURE FOR BACTERIA: CPT | Performed by: STUDENT IN AN ORGANIZED HEALTH CARE EDUCATION/TRAINING PROGRAM

## 2023-10-28 PROCEDURE — 80048 BASIC METABOLIC PNL TOTAL CA: CPT | Performed by: INTERNAL MEDICINE

## 2023-10-28 PROCEDURE — 99233 SBSQ HOSP IP/OBS HIGH 50: CPT | Performed by: INTERNAL MEDICINE

## 2023-10-28 PROCEDURE — 99232 SBSQ HOSP IP/OBS MODERATE 35: CPT | Performed by: INTERNAL MEDICINE

## 2023-10-28 PROCEDURE — 0JPT0WZ REMOVAL OF TOTALLY IMPLANTABLE VASCULAR ACCESS DEVICE FROM TRUNK SUBCUTANEOUS TISSUE AND FASCIA, OPEN APPROACH: ICD-10-PCS | Performed by: STUDENT IN AN ORGANIZED HEALTH CARE EDUCATION/TRAINING PROGRAM

## 2023-10-28 PROCEDURE — 82948 REAGENT STRIP/BLOOD GLUCOSE: CPT

## 2023-10-28 PROCEDURE — 02PYX3Z REMOVAL OF INFUSION DEVICE FROM GREAT VESSEL, EXTERNAL APPROACH: ICD-10-PCS | Performed by: STUDENT IN AN ORGANIZED HEALTH CARE EDUCATION/TRAINING PROGRAM

## 2023-10-28 RX ORDER — SODIUM CHLORIDE, SODIUM GLUCONATE, SODIUM ACETATE, POTASSIUM CHLORIDE, MAGNESIUM CHLORIDE, SODIUM PHOSPHATE, DIBASIC, AND POTASSIUM PHOSPHATE .53; .5; .37; .037; .03; .012; .00082 G/100ML; G/100ML; G/100ML; G/100ML; G/100ML; G/100ML; G/100ML
500 INJECTION, SOLUTION INTRAVENOUS ONCE
Status: COMPLETED | OUTPATIENT
Start: 2023-10-29 | End: 2023-10-29

## 2023-10-28 RX ADMIN — SUCRALFATE 1 G: 1 TABLET ORAL at 12:00

## 2023-10-28 RX ADMIN — ATORVASTATIN CALCIUM 80 MG: 80 TABLET, FILM COATED ORAL at 16:16

## 2023-10-28 RX ADMIN — INSULIN LISPRO 3 UNITS: 100 INJECTION, SOLUTION INTRAVENOUS; SUBCUTANEOUS at 09:51

## 2023-10-28 RX ADMIN — NORTRIPTYLINE HYDROCHLORIDE 10 MG: 10 CAPSULE ORAL at 20:30

## 2023-10-28 RX ADMIN — SUCRALFATE 1 G: 1 TABLET ORAL at 16:16

## 2023-10-28 RX ADMIN — INSULIN LISPRO 3 UNITS: 100 INJECTION, SOLUTION INTRAVENOUS; SUBCUTANEOUS at 16:00

## 2023-10-28 RX ADMIN — PREGABALIN 75 MG: 50 CAPSULE ORAL at 09:51

## 2023-10-28 RX ADMIN — OXYBUTYNIN CHLORIDE 5 MG: 5 TABLET ORAL at 09:52

## 2023-10-28 RX ADMIN — CLOPIDOGREL BISULFATE 75 MG: 75 TABLET ORAL at 09:52

## 2023-10-28 RX ADMIN — DOCUSATE SODIUM 100 MG: 100 CAPSULE ORAL at 09:51

## 2023-10-28 RX ADMIN — PANTOPRAZOLE SODIUM 40 MG: 40 TABLET, DELAYED RELEASE ORAL at 09:52

## 2023-10-28 RX ADMIN — OXYBUTYNIN CHLORIDE 5 MG: 5 TABLET ORAL at 18:46

## 2023-10-28 RX ADMIN — TACROLIMUS 1 MG: 1 CAPSULE ORAL at 20:30

## 2023-10-28 RX ADMIN — CEFAZOLIN SODIUM 1000 MG: 1 SOLUTION INTRAVENOUS at 02:53

## 2023-10-28 RX ADMIN — HEPARIN SODIUM 5000 UNITS: 5000 INJECTION INTRAVENOUS; SUBCUTANEOUS at 20:30

## 2023-10-28 RX ADMIN — INSULIN LISPRO 3 UNITS: 100 INJECTION, SOLUTION INTRAVENOUS; SUBCUTANEOUS at 12:00

## 2023-10-28 RX ADMIN — PREGABALIN 75 MG: 50 CAPSULE ORAL at 18:46

## 2023-10-28 RX ADMIN — SUCRALFATE 1 G: 1 TABLET ORAL at 05:43

## 2023-10-28 RX ADMIN — TAMSULOSIN HYDROCHLORIDE 0.4 MG: 0.4 CAPSULE ORAL at 16:16

## 2023-10-28 RX ADMIN — ACETAMINOPHEN 650 MG: 325 TABLET, FILM COATED ORAL at 16:13

## 2023-10-28 RX ADMIN — SODIUM BICARBONATE 650 MG TABLET 650 MG: at 09:00

## 2023-10-28 RX ADMIN — SODIUM BICARBONATE 650 MG TABLET 650 MG: at 18:43

## 2023-10-28 RX ADMIN — TACROLIMUS 1 MG: 1 CAPSULE ORAL at 09:52

## 2023-10-28 RX ADMIN — ZOLPIDEM TARTRATE 5 MG: 5 TABLET ORAL at 20:30

## 2023-10-28 RX ADMIN — HEPARIN SODIUM 5000 UNITS: 5000 INJECTION INTRAVENOUS; SUBCUTANEOUS at 05:43

## 2023-10-28 RX ADMIN — SUCRALFATE 1 G: 1 TABLET ORAL at 20:30

## 2023-10-28 RX ADMIN — HEPARIN SODIUM 5000 UNITS: 5000 INJECTION INTRAVENOUS; SUBCUTANEOUS at 14:00

## 2023-10-28 NOTE — PROGRESS NOTES
[facility]  Progress Note  Name: Glenn Vizcaino  MRN: [id number]  Unit/Bed#: LJ5 870-01 I Date of Admission: 10/23/2023   Date of Service: 10/28/2023 I Hospital Day: 5    Assessment/Plan   * Fall  Assessment & Plan  Past medical history of vertigo, dementia and cirrhosis presenting after multiple falls over last 2 days. After discussion with family, patient has been dizzy for several weeks with poor oral intake along with complaints of urinary frequency and dysuria. Meclizine as needed  Obtain orthostatic vitals  Work-up revealed UTI and bacteremia, treatment as below  PT/OT    MSSA bacteremia  Assessment & Plan  Plan as above  D/w with the patient and his wife who are agreeable to port removal  IR consulted for removal    UTI (urinary tract infection)  Assessment & Plan  History of UTIs  Patient denies any dysuria however family reports that he has been complaining of dysuria for the last few days with mild confusion  UA with signs of infection  CT A/P with right renal atrophy, moderate right hydronephrosis with right ureteral stent. There is mild diffuse right urothelial thickening and enhancement. There is also mild diffuse thickening of the urinary bladder and adjacent fat stranding. This may be explained by infectious etiology including right ureteritis and cystitis.   Prior CT scan reviewed this similar ureteritis findings however cystitis appears to be new  Bcx negative for growth to date  Uine culture is positive for alpha step and MSSA, continue cefazolin  Per urology no indication for stent change  Monitor temps, WBCs  Repeat cultures are pending    Acute on chronic renal failure   Assessment & Plan  History of CKD with baseline creatinine of 3.3-3.6  Likely TAVON on CKD due to poor po intake and possible contrast nephropathy  Presenting with creatinine of 4.2  Not meeting criteria for TAVON though likely elevated secondary to underlying CKD and poor oral intake  Received IV contrast for trauma scans  IVF hydration  Avoid nephrotoxic agents and relative hypotension  Nephrology following, no indication to do HD at this time.   Renal function is slowly improving, will continue to monitor closely    Anemia  Assessment & Plan  Chronic anemia likely due to CKD and multiple blood draws  Likely worsened due to acute infection  Check anemia studies and transfuse 1 unit of PBRCS 10/25  Blood consent obtained    History of CVA (cerebrovascular accident)  Assessment & Plan  Remote history of CVA  Continue Plavix    Thrombocytopenia (HCC)  Assessment & Plan  Lab Results   Component Value Date    PLT 94 (L) 10/27/2023     Chronic at baseline    CKD (chronic kidney disease)  Assessment & Plan  Lab Results   Component Value Date    CREATININE 4.67 (H) 10/28/2023    CREATININE 4.66 (H) 10/27/2023    CREATININE 4.85 (H) 10/26/2023   Plan as above    Dementia ([address])  Assessment & Plan  A&O x4  Outpatient follow-up  Delirium precautions    T2DM (type 2 diabetes mellitus) ([address])  Assessment & Plan  No results found for: "HGBA1C"    Recent Labs     10/27/23  2143 10/28/23  0826 10/28/23  1238 10/28/23  1435   POCGLU 150* 116 144* 123         Blood Sugar Average: Last 72 hrs:  (P) [id number]  Not on any oral agents  SSI while inpatient  Monitor glucose closely  Prandial and basal insulin added    History of liver transplant ([address])  Assessment & Plan  History of liver transplant secondary to alcoholic cirrhosis  Continue tacrolimus 1 mg every 12 hours  Follow-up with GI outpatient    Insomnia  Assessment & Plan  Continue temazepam and Ambien which patient has been on for 30 years  Confirmed with PDMP    Bladder cancer 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 [facility])  Assessment & Plan  History of stage IV bladder tumor status post resection and radiation with metastasis to the lungs  Not a candidate for further therapy cancer directed treatment  No pain currently  Supportive care  Continue nortriptyline and Lyrica, PDMP reviewed  Follows up with palliative care outpatient               VTE Pharmacologic Prophylaxis: VTE Score: 6 Moderate Risk (Score 3-4) - Pharmacological DVT Prophylaxis Ordered: heparin. Patient Centered Rounds: I performed bedside rounds with nursing staff today. Discussions with Specialists or Other Care Team Provider: nurse, CM, ID, nephrology, IR    Education and Discussions with Family / Patient: Updated  (wife and daughter) at bedside. Total Time Spent on Date of Encounter in care of patient: 40 mins. This time was spent on one or more of the following: performing physical exam; counseling and coordination of care; obtaining or reviewing history; documenting in the medical record; reviewing/ordering tests, medications or procedures; communicating with other healthcare professionals and discussing with patient's family/caregivers. Current Length of Stay: 5 day(s)  Current Patient Status: Inpatient   Certification Statement: The patient will continue to require additional inpatient hospital stay due to sepsis  Discharge Plan: Anticipate discharge in >72 hrs to home. Code Status: Level 3 - DNAR and DNI    Subjective:   Febrile, weak and tired today    Objective:     Vitals:   Temp (24hrs), Av.7 °F (37.6 °C), Min:98.5 °F (36.9 °C), Max:101.6 °F (38.7 °C)    Temp:  [98.5 °F (36.9 °C)-101.6 °F (38.7 °C)] 101.6 °F (38.7 °C)  HR:  [] 120  Resp:  [16-17] 17  BP: (109-138)/(62-74) 120/62  SpO2:  [93 %-99 %] 97 %  Body mass index is 22.22 kg/m². Input and Output Summary (last 24 hours): Intake/Output Summary (Last 24 hours) at 10/28/2023 1526  Last data filed at 10/28/2023 0606  Gross per 24 hour   Intake 955 ml   Output 400 ml   Net 555 ml       Physical Exam:   Physical Exam  Constitutional:       Appearance: Laureenl Jersey is ill-appearing. HENT:      Head: Normocephalic and atraumatic.       Nose: Nose normal.      Mouth/Throat:      Mouth: Mucous membranes are moist.      Pharynx: Oropharynx is clear. Eyes:      Extraocular Movements: Extraocular movements intact. Cardiovascular:      Rate and Rhythm: Normal rate and regular rhythm. Pulmonary:      Breath sounds: No wheezing or rales. Skin:     General: Skin is warm and dry. Neurological:      Mental Status: Chidi Picking is alert. Mental status is at baseline. Psychiatric:         Mood and Affect: Mood normal.         Behavior: Behavior normal.          Additional Data:     Labs:  Results from last 7 days   Lab Units 10/27/23  0526 10/26/23  0636   WBC Thousand/uL 6.99 5.54   HEMOGLOBIN g/dL 8.9* 8.1*   HEMATOCRIT % 26.2* 24.0*   PLATELETS Thousands/uL 94* 75*   NEUTROS PCT %  --  77*   LYMPHS PCT %  --  11*   MONOS PCT %  --  9   EOS PCT %  --  2     Results from last 7 days   Lab Units 10/28/23  0654 10/24/23  0534 10/23/23  1133   SODIUM mmol/L 134*   < > 135   POTASSIUM mmol/L 3.7   < > 4.5   CHLORIDE mmol/L 103   < > 108   CO2 mmol/L 22   < > 16*   BUN mg/dL 50*   < > 56*   CREATININE mg/dL 4.67*   < > 4.25*   ANION GAP mmol/L 9   < > 11   CALCIUM mg/dL 7.4*   < > 7.7*   ALBUMIN g/dL  --   --  3.3*   TOTAL BILIRUBIN mg/dL  --   --  0.44   ALK PHOS U/L  --   --  59   ALT U/L  --   --  16   AST U/L  --   --  37   GLUCOSE RANDOM mg/dL 110   < > 188*    < > = values in this interval not displayed. Results from last 7 days   Lab Units 10/28/23  1435 10/28/23  1238 10/28/23  0826 10/27/23  2143 10/27/23  1709 10/27/23  1147 10/27/23  0801 10/26/23  2133 10/26/23  1619 10/26/23  1129 10/26/23  0819 10/25/23  1655   POC GLUCOSE mg/dl 123 144* 116 150* 199* 211* 139 215* 253* 274* 298* 288*         Results from last 7 days   Lab Units 10/26/23  0154   PROCALCITONIN ng/ml 4.11*       Lines/Drains:  Invasive Devices       Central Venous Catheter Line  Duration             Port A Cath Right -- days                    Central Line:  Goal for removal: No longer needed.  Will place order to discontinue. Imaging: No pertinent imaging reviewed. Recent Cultures (last 7 days):   Results from last 7 days   Lab Units 10/28/23  0603 10/27/23  1620 10/26/23  0155 10/23/23  2216 10/23/23  1534   BLOOD CULTURE  Received in Microbiology Lab. Culture in Progress. Received in Microbiology Lab. Culture in Progress. Received in Microbiology Lab. Culture in Progress. No Growth at 48 hrs.  Staphylococcus aureus*  --    GRAM STAIN RESULT   --   --   --  Gram positive cocci in clusters*  --    URINE CULTURE   --   --   --   --  60,000-69,000 cfu/ml Staphylococcus aureus*  30,000-39,000 cfu/ml Alpha Hemolytic Streptococcus NOT Enterococcus*       Last 24 Hours Medication List:   Current Facility-Administered Medications   Medication Dose Route Frequency Provider Last Rate    acetaminophen  650 mg Oral Q6H PRN Cele Verdugo, DO      atorvastatin  80 mg Oral Daily With [employer], DO      cefazolin  1,000 mg Intravenous Q24H LUCY Sheikh 1,000 mg (10/28/23 0253)    clopidogrel  75 mg Oral Daily Cele Verdugo, DO      docusate sodium  100 mg Oral BID Miriam Corrigan MD      heparin (porcine)  5,000 Units Subcutaneous Q8H [address] Cele Verdugo, DO      insulin glargine  10 Units Subcutaneous HS Miriam Corrigan MD      insulin lispro  1-5 Units Subcutaneous TID AC Cele Verdugo, DO      insulin lispro  3 Units Subcutaneous TID With Meals Miriam Corrigan MD      meclizine  25 mg Oral Q8H PRN Gerardine Hard, DO      multi-electrolyte  50 mL/hr Intravenous Continuous Bienvenidodiane Grossman MD 50 mL/hr (10/27/23 2158)    nortriptyline  10 mg Oral HS Cele Verdugo, DO      oxybutynin  5 mg Oral BID Cele Verdugo, DO      oxyCODONE  2.5 mg Oral Q4H PRN Miriam Corrigan MD      pantoprazole  40 mg Oral Daily [facility], [facility]      pregabalin  75 mg Oral BID Cele Verdugo, DO      senna  2 tablet Oral Once Miriam Corrigan MD      sodium bicarbonate  650 mg Oral BID after meals Keke Onofre MD      sucralfate  1 g Oral 4x Daily (AC & HS) Cele Verdugo DO      tacrolimus  1 mg Oral Q12H [facility] Cele Verdugo DO      tamsulosin  0.4 mg Oral Daily With Julián Palmer, DO      temazepam  15 mg Oral HS PRN Cele Verdugo,       zolpidem  5 mg Oral HS PRN Dutch King, DO          Today, Patient Was Seen By: Milton Herrera MD    **Please Note: This note may have been constructed using a voice recognition system. **

## 2023-10-28 NOTE — CONSULTS
e-Consult (IPC)  - Interventional Radiology  Jeff Powell 74 y.o. adult MRN: [id number]  Unit/Bed#: RM2 870-01 Encounter: [id number]          Interventional Radiology has been consulted to evaluate Jeff Powell    We were consulted by Dr. Courtney Isaac concerning this patient with sepsis. Inpatient Consult to IR  Consult performed by: Mao Yuan MD  Consult ordered by: Linh Jerome MD        10/28/23    Assessment/Recommendation:   73 yo male admitted with sepsis, MSSA bacteremia likely related to a urinary tract infection. He does have a chronic port-a-cath which has not been used recently. Given that he no longer needs the port, ID is recommending removal of the port in the setting of MSSA bacteremia. Will plan for removal of the port early next week with culture of the tip.    11-20 minutes, >50% of the total time devoted to medical consultative verbal/EMR discussion between providers. Written report will be generated in the EMR. Thank you for allowing Interventional Radiology to participate in the care of Jeff Powell. Please don't hesitate to call or TigerText us with any questions.      Mao Yuan MD

## 2023-10-28 NOTE — PROGRESS NOTES
NEPHROLOGY PROGRESS NOTE   Bev Duncan 74 y.o. adult MRN: [id number]  Unit/Bed#: WQ3 870-01 Encounter: [id number]      ASSESSMENT & PLAN:  #1 acute kidney injury on top of CKD  Baseline creatinine reported 3.1 on 8/2023. TAVON likely secondary to ATN, requiring TAVON, progression of underlying kidney disease. Creatinine peaked at 5.1 kidney function slowly improving and stable at 4.67 today  Continue current regimen, continue with IV fluids given poor oral tolerance. Follow daily labs  No urgent indication for dialysis at this moment, will monitor closely during the weekend    2 hypertension, pressure is stable, with relative hypotension, continue with IV fluids due to poor oral intake    3 anemia, monitor H&H    4 history of liver transplant, continue with tacrolimus    5 metabolic acidosis in the setting of advanced kidney disease, continue with sodium bicarbonate    SUBJECTIVE:  Patient is seen and examined, feeling cold, reports nauseous and poor appetite. Denies any chest pain, no shortness of breath, no abdominal pain, no vomiting.   Family at bedside      OBJECTIVE:  Current Weight: Weight - Scale: 56.9 kg (125 lb 7.1 oz)  Vitals:    10/28/23 0700   BP: 127/74   Pulse: (!) 111   Resp: 16   Temp: 99.9 °F (37.7 °C)   SpO2: 97%       Intake/Output Summary (Last 24 hours) at 10/28/2023 1322  Last data filed at 10/28/2023 0606  Gross per 24 hour   Intake 955 ml   Output 400 ml   Net 555 ml       General: conscious, cooperative, in not acute distress  Eyes: conjunctivae pink, anicteric sclerae  ENT: lips and mucous membranes moist  Neck: supple, no JVD  Chest: clear breath sounds bilateral, no crackles, ronchus or wheezings  CVS: Tachycardic  Abdomen: soft, non-tender, non-distended, normoactive bowel sounds  Extremities: no edema of both legs  Skin: no rash  Neuro: awake, alert, oriented      Medications:    Current Facility-Administered Medications:     acetaminophen (TYLENOL) tablet 650 mg, 650 mg, Oral, Q6H PRN, Cheryl Mckay DO, 650 mg at 10/27/23 2016    atorvastatin (LIPITOR) tablet 80 mg, 80 mg, Oral, Daily With Dinner, Cheryl Mckay DO, 80 mg at 10/27/23 1606    ceFAZolin (ANCEF) IVPB (premix in dextrose) 1,000 mg 50 mL, 1,000 mg, Intravenous, Q24H, LUCY Chew, Last Rate: 100 mL/hr at 10/28/23 0253, 1,000 mg at 10/28/23 0253    clopidogrel (PLAVIX) tablet 75 mg, 75 mg, Oral, Daily, Cele Verdugo DO, 75 mg at 10/28/23 2212    docusate sodium (COLACE) capsule 100 mg, 100 mg, Oral, BID, Jada Akhtar MD, 100 mg at 10/28/23 0951    heparin (porcine) subcutaneous injection 5,000 Units, 5,000 Units, Subcutaneous, Q8H [address], Cele Verdugo DO, 5,000 Units at 10/28/23 0543    insulin glargine (LANTUS) subcutaneous injection 10 Units 0.1 mL, 10 Units, Subcutaneous, HS, Jada Akhtar MD, 10 Units at 10/27/23 2153    insulin lispro (HumaLOG) 100 units/mL subcutaneous injection 1-5 Units, 1-5 Units, Subcutaneous, TID AC, 1 Units at 10/27/23 1813 **AND** Fingerstick Glucose (POCT), , , TID AC, Cele Verdugo DO    insulin lispro (HumaLOG) 100 units/mL subcutaneous injection 3 Units, 3 Units, Subcutaneous, TID With Meals, Jada Akhtar MD, 3 Units at 10/28/23 0951    meclizine (ANTIVERT) tablet 25 mg, 25 mg, Oral, Q8H PRN, Cele Verdugo DO    multi-electrolyte (PLASMALYTE-A/ISOLYTE-S PH 7.4) IV solution, 50 mL/hr, Intravenous, Continuous, Margarita Bragg MD, Last Rate: 50 mL/hr at 10/27/23 2158, 50 mL/hr at 10/27/23 2158    nortriptyline (PAMELOR) capsule 10 mg, 10 mg, Oral, HS, Cele Verdugo DO, 10 mg at 10/27/23 2015    oxybutynin (DITROPAN) tablet 5 mg, 5 mg, Oral, BID, Cele Verdugo DO, 5 mg at 10/28/23 6221    oxyCODONE (ROXICODONE) split tablet 2.5 mg, 2.5 mg, Oral, Q4H PRN, Jada Akhtar MD, 2.5 mg at 10/26/23 1406    pantoprazole (PROTONIX) EC tablet 40 mg, 40 mg, Oral, Daily, Cele Verdugo DO, 40 mg at 10/28/23 0942    pregabalin (LYRICA) capsule 75 mg, 75 mg, Oral, BID, Harnishkumar Verdugo, DO, 75 mg at 10/28/23 0951    senna (SENOKOT) tablet 17.2 mg, 2 tablet, Oral, Once, Verenice Molina MD    sodium bicarbonate tablet 650 mg, 650 mg, Oral, BID after meals, Stella Evans MD, 650 mg at 10/27/23 1810    sucralfate (CARAFATE) tablet 1 g, 1 g, Oral, 4x Daily (AC & HS), Harnishkumar Verdugo, DO, 1 g at 10/28/23 0543    tacrolimus (PROGRAF) capsule 1 mg, 1 mg, Oral, Q12H [facility], Harnishkumar Verdugo, DO, 1 mg at 10/28/23 1739    tamsulosin (FLOMAX) capsule 0.4 mg, 0.4 mg, Oral, Daily With Dinner, [employer], DO, 0.4 mg at 10/27/23 1606    temazepam (RESTORIL) capsule 15 mg, 15 mg, Oral, HS PRN, Harnishkumar Verdugo, DO, 15 mg at 10/25/23 2202    zolpidem (AMBIEN) tablet 5 mg, 5 mg, Oral, HS PRN, [employer], DO, 5 mg at 10/27/23 2016    Invasive Devices:        Lab Results:   Results from last 7 days   Lab Units 10/28/23  0654 10/27/23  0526 10/26/23  0636 10/26/23  0154 10/24/23  0534 10/23/23  1133   WBC Thousand/uL  --  6.99 5.54 6.08   < > 8.06   HEMOGLOBIN g/dL  --  8.9* 8.1* 8.5*   < > 8.0*   HEMATOCRIT %  --  26.2* 24.0* 26.0*   < > 25.7*   PLATELETS Thousands/uL  --  94* 75* 82*   < > 68*   SODIUM mmol/L 134* 135 134*  --    < > 135   POTASSIUM mmol/L 3.7 3.7 3.9  --    < > 4.5   CHLORIDE mmol/L 103 103 104  --    < > 108   CO2 mmol/L 22 21 20*  --    < > 16*   BUN mg/dL 50* 47* 50*  --    < > 56*   CREATININE mg/dL 4.67* 4.66* 4.85*  --    < > 4.25*   CALCIUM mg/dL 7.4* 7.7* 7.0*  --    < > 7.7*   MAGNESIUM mg/dL  --  1.4*  --   --   --   --    PHOSPHORUS mg/dL  --  3.0  --   --   --   --    ALK PHOS U/L  --   --   --   --   --  59   ALT U/L  --   --   --   --   --  16   AST U/L  --   --   --   --   --  37    < > = values in this interval not displayed. Portions of the record may have been created with voice recognition software.  Occasional wrong word or "sound a like" substitutions may have occurred due to the inherent limitations of voice recognition software. Read the chart carefully and recognize, using context, where substitutions have occurred. If you have any questions, please contact the dictating provider.

## 2023-10-29 LAB
ANION GAP SERPL CALCULATED.3IONS-SCNC: 12 MMOL/L
BUN SERPL-MCNC: 57 MG/DL (ref 5–25)
CALCIUM SERPL-MCNC: 7.4 MG/DL (ref 8.4–10.2)
CHLORIDE SERPL-SCNC: 101 MMOL/L (ref 96–108)
CO2 SERPL-SCNC: 21 MMOL/L (ref 21–32)
CREAT SERPL-MCNC: 5.44 MG/DL (ref 0.6–1.3)
ERYTHROCYTE [DISTWIDTH] IN BLOOD BY AUTOMATED COUNT: 15 % (ref 11.6–15.1)
GLUCOSE SERPL-MCNC: 126 MG/DL (ref 65–140)
GLUCOSE SERPL-MCNC: 166 MG/DL (ref 65–140)
GLUCOSE SERPL-MCNC: 218 MG/DL (ref 65–140)
GLUCOSE SERPL-MCNC: 241 MG/DL (ref 65–140)
HCT VFR BLD AUTO: 22.9 % (ref 36.5–46.1)
HGB BLD-MCNC: 7.2 G/DL (ref 12–15.4)
MCH RBC QN AUTO: 24.2 PG (ref 26.8–34.3)
MCHC RBC AUTO-ENTMCNC: 31.4 G/DL (ref 31.4–37.4)
MCV RBC AUTO: 77 FL (ref 82–98)
PLATELET # BLD AUTO: 101 THOUSANDS/UL (ref 149–390)
PMV BLD AUTO: 10.8 FL (ref 8.9–12.7)
POTASSIUM SERPL-SCNC: 4 MMOL/L (ref 3.5–5.3)
PROCALCITONIN SERPL-MCNC: 56.62 NG/ML
RBC # BLD AUTO: 2.97 MILLION/UL (ref 3.88–5.12)
SODIUM SERPL-SCNC: 134 MMOL/L (ref 135–147)
WBC # BLD AUTO: 8.79 THOUSAND/UL (ref 4.31–10.16)

## 2023-10-29 PROCEDURE — 99233 SBSQ HOSP IP/OBS HIGH 50: CPT | Performed by: INTERNAL MEDICINE

## 2023-10-29 PROCEDURE — 84145 PROCALCITONIN (PCT): CPT | Performed by: INTERNAL MEDICINE

## 2023-10-29 PROCEDURE — 85027 COMPLETE CBC AUTOMATED: CPT | Performed by: INTERNAL MEDICINE

## 2023-10-29 PROCEDURE — 82948 REAGENT STRIP/BLOOD GLUCOSE: CPT

## 2023-10-29 PROCEDURE — 80048 BASIC METABOLIC PNL TOTAL CA: CPT | Performed by: INTERNAL MEDICINE

## 2023-10-29 PROCEDURE — 99232 SBSQ HOSP IP/OBS MODERATE 35: CPT | Performed by: INTERNAL MEDICINE

## 2023-10-29 RX ADMIN — SUCRALFATE 1 G: 1 TABLET ORAL at 17:10

## 2023-10-29 RX ADMIN — TEMAZEPAM 15 MG: 15 CAPSULE ORAL at 20:13

## 2023-10-29 RX ADMIN — SODIUM BICARBONATE 650 MG TABLET 650 MG: at 17:10

## 2023-10-29 RX ADMIN — HEPARIN SODIUM 5000 UNITS: 5000 INJECTION INTRAVENOUS; SUBCUTANEOUS at 06:15

## 2023-10-29 RX ADMIN — DOCUSATE SODIUM 100 MG: 100 CAPSULE ORAL at 17:10

## 2023-10-29 RX ADMIN — SUCRALFATE 1 G: 1 TABLET ORAL at 07:00

## 2023-10-29 RX ADMIN — INSULIN LISPRO 1 UNITS: 100 INJECTION, SOLUTION INTRAVENOUS; SUBCUTANEOUS at 11:39

## 2023-10-29 RX ADMIN — INSULIN LISPRO 3 UNITS: 100 INJECTION, SOLUTION INTRAVENOUS; SUBCUTANEOUS at 17:10

## 2023-10-29 RX ADMIN — INSULIN LISPRO 1 UNITS: 100 INJECTION, SOLUTION INTRAVENOUS; SUBCUTANEOUS at 17:10

## 2023-10-29 RX ADMIN — SUCRALFATE 1 G: 1 TABLET ORAL at 20:09

## 2023-10-29 RX ADMIN — HEPARIN SODIUM 5000 UNITS: 5000 INJECTION INTRAVENOUS; SUBCUTANEOUS at 14:00

## 2023-10-29 RX ADMIN — DOCUSATE SODIUM 100 MG: 100 CAPSULE ORAL at 11:36

## 2023-10-29 RX ADMIN — CEFAZOLIN SODIUM 1000 MG: 1 SOLUTION INTRAVENOUS at 03:44

## 2023-10-29 RX ADMIN — OXYBUTYNIN CHLORIDE 5 MG: 5 TABLET ORAL at 11:36

## 2023-10-29 RX ADMIN — SUCRALFATE 1 G: 1 TABLET ORAL at 11:36

## 2023-10-29 RX ADMIN — ATORVASTATIN CALCIUM 80 MG: 80 TABLET, FILM COATED ORAL at 17:09

## 2023-10-29 RX ADMIN — PANTOPRAZOLE SODIUM 40 MG: 40 TABLET, DELAYED RELEASE ORAL at 09:00

## 2023-10-29 RX ADMIN — INSULIN GLARGINE 10 UNITS: 100 INJECTION, SOLUTION SUBCUTANEOUS at 21:18

## 2023-10-29 RX ADMIN — TACROLIMUS 1 MG: 1 CAPSULE ORAL at 11:38

## 2023-10-29 RX ADMIN — NORTRIPTYLINE HYDROCHLORIDE 10 MG: 10 CAPSULE ORAL at 20:09

## 2023-10-29 RX ADMIN — INSULIN LISPRO 3 UNITS: 100 INJECTION, SOLUTION INTRAVENOUS; SUBCUTANEOUS at 11:40

## 2023-10-29 RX ADMIN — INSULIN LISPRO 3 UNITS: 100 INJECTION, SOLUTION INTRAVENOUS; SUBCUTANEOUS at 07:00

## 2023-10-29 RX ADMIN — TACROLIMUS 1 MG: 1 CAPSULE ORAL at 20:09

## 2023-10-29 RX ADMIN — PREGABALIN 75 MG: 50 CAPSULE ORAL at 09:00

## 2023-10-29 RX ADMIN — SODIUM BICARBONATE 650 MG TABLET 650 MG: at 11:38

## 2023-10-29 RX ADMIN — CLOPIDOGREL BISULFATE 75 MG: 75 TABLET ORAL at 09:00

## 2023-10-29 RX ADMIN — PREGABALIN 75 MG: 50 CAPSULE ORAL at 17:11

## 2023-10-29 RX ADMIN — SODIUM CHLORIDE, SODIUM GLUCONATE, SODIUM ACETATE, POTASSIUM CHLORIDE, MAGNESIUM CHLORIDE, SODIUM PHOSPHATE, DIBASIC, AND POTASSIUM PHOSPHATE 500 ML: .53; .5; .37; .037; .03; .012; .00082 INJECTION, SOLUTION INTRAVENOUS at 00:10

## 2023-10-29 RX ADMIN — HEPARIN SODIUM 5000 UNITS: 5000 INJECTION INTRAVENOUS; SUBCUTANEOUS at 20:09

## 2023-10-29 RX ADMIN — ZOLPIDEM TARTRATE 5 MG: 5 TABLET ORAL at 20:09

## 2023-10-29 RX ADMIN — OXYBUTYNIN CHLORIDE 5 MG: 5 TABLET ORAL at 17:10

## 2023-10-29 RX ADMIN — TAMSULOSIN HYDROCHLORIDE 0.4 MG: 0.4 CAPSULE ORAL at 17:11

## 2023-10-29 NOTE — PROGRESS NOTES
NEPHROLOGY PROGRESS NOTE   Rosmery Tuttle 74 y.o. adult MRN: [id number]  Unit/Bed#: AP2 870-01 Encounter: [id number]      ASSESSMENT & PLAN:  #1 acute kidney injury on top of CKD stage IV. Follows with Dr. Durga Corona,  Baseline creatinine reported 3.6 on 8/2023. TAVON likely secondary to ATN, requiring TAVON, progression of underlying kidney disease. Creatinine peaked at 5.01 on 10/25, kidney function was improving with creatinine down to 4.67 yesterday. Noted creatinine today up to 5.44 in the setting of hypotension and fever. Agree with IV fluids. Avoid relative hypotension  Follow daily labs  No urgent indication for dialysis at this moment, will monitor closely during the weekend    2 hypertension, noted hypotensive yesterday and febrile, agree with IV fluids and avoid relative hypotension    3 anemia, monitor H&H    4 history of liver transplant, continue with tacrolimus    5 metabolic acidosis in the setting of advanced kidney disease, continue with sodium bicarbonate, stable    6. fevers, management as per primary team, follow-up on cultures    Plan and recommendation was discussed earlier with primary team attending via [facility] text, continue IV fluid, avoid relative hypotension, follow daily labs, he agrees with the plan      SUBJECTIVE:  Patient seen and examined, sitting on recliner, denies any chest pain or shortness of breath, reported feeling hungry, denies any or vomiting.   Patient family including wife at bedside    OBJECTIVE:  Current Weight: Weight - Scale: 61.2 kg (134 lb 14.7 oz)  Vitals:    10/29/23 0720   BP: 102/61   Pulse: 61   Resp: 18   Temp: 97.7 °F (36.5 °C)   SpO2: 95%     No intake or output data in the 24 hours ending 10/29/23 1137      General: conscious, cooperative, in not acute distress  Eyes: conjunctivae pink, anicteric sclerae  ENT: lips and mucous membranes moist  Neck: supple, no JVD  Chest: clear breath sounds bilateral, no crackles, ronchus or wheezings  CVS: distinct S1 & S2, normal rate, regular rhythm  Abdomen: soft, non-tender, non-distended, normoactive bowel sounds  Extremities: no edema of both legs  Skin: no rash  Neuro: awake, alert, oriented        Medications:    Current Facility-Administered Medications:     acetaminophen (TYLENOL) tablet 650 mg, 650 mg, Oral, Q6H PRN, Cele Verdugo DO, 650 mg at 10/28/23 1613    atorvastatin (LIPITOR) tablet 80 mg, 80 mg, Oral, Daily With Dinner, Gerardine Hard, DO, 80 mg at 10/28/23 1616    ceFAZolin (ANCEF) IVPB (premix in dextrose) 1,000 mg 50 mL, 1,000 mg, Intravenous, Q24H, LUCY Sheikh, Last Rate: 100 mL/hr at 10/29/23 0344, 1,000 mg at 10/29/23 0344    clopidogrel (PLAVIX) tablet 75 mg, 75 mg, Oral, Daily, Cele Verdugo DO, 75 mg at 10/29/23 0900    docusate sodium (COLACE) capsule 100 mg, 100 mg, Oral, BID, Miriam Corrigan MD, 100 mg at 10/29/23 1136    heparin (porcine) subcutaneous injection 5,000 Units, 5,000 Units, Subcutaneous, Q8H [address], Cele Verdugo DO, 5,000 Units at 10/29/23 0615    insulin glargine (LANTUS) subcutaneous injection 10 Units 0.1 mL, 10 Units, Subcutaneous, HS, Miriam Corrigan MD, 10 Units at 10/27/23 2153    insulin lispro (HumaLOG) 100 units/mL subcutaneous injection 1-5 Units, 1-5 Units, Subcutaneous, TID AC, 1 Units at 10/27/23 1813 **AND** Fingerstick Glucose (POCT), , , TID AC, Cele Verdugo DO    insulin lispro (HumaLOG) 100 units/mL subcutaneous injection 3 Units, 3 Units, Subcutaneous, TID With Meals, Miriam Corrigan MD, 3 Units at 10/28/23 1600    meclizine (ANTIVERT) tablet 25 mg, 25 mg, Oral, Q8H PRN, Cele Verdugo DO    multi-electrolyte (PLASMALYTE-A/ISOLYTE-S PH 7.4) IV solution, 100 mL/hr, Intravenous, Continuous, Miriam Corrigan MD, Last Rate: 100 mL/hr at 10/29/23 1136, 100 mL/hr at 10/29/23 1136    nortriptyline (PAMELOR) capsule 10 mg, 10 mg, Oral, HS, Cele Verdugo, DO, 10 mg at 10/28/23 2030    oxybutynin (DITROPAN) tablet 5 mg, 5 mg, Oral, BID, Queumar Verdugo, DO, 5 mg at 10/29/23 1136    oxyCODONE (ROXICODONE) split tablet 2.5 mg, 2.5 mg, Oral, Q4H PRN, Marciano Anderson MD, 2.5 mg at 10/26/23 1406    pantoprazole (PROTONIX) EC tablet 40 mg, 40 mg, Oral, Daily, Cele [place], DO, 40 mg at 10/29/23 0900    pregabalin (LYRICA) capsule 75 mg, 75 mg, Oral, BID, Cele Verdugo, DO, 75 mg at 10/29/23 0900    senna (SENOKOT) tablet 17.2 mg, 2 tablet, Oral, Once, Marciano Anderson MD    sodium bicarbonate tablet 650 mg, 650 mg, Oral, BID after meals, Alexandra Carvalho MD, 650 mg at 10/28/23 1843    sucralfate (CARAFATE) tablet 1 g, 1 g, Oral, 4x Daily (AC & HS), Cele Verdugo, DO, 1 g at 10/29/23 1136    tacrolimus (PROGRAF) capsule 1 mg, 1 mg, Oral, Q12H [address], Cele Verdugo, DO, 1 mg at 10/28/23 2030    tamsulosin (FLOMAX) capsule 0.4 mg, 0.4 mg, Oral, Daily With Dinner, [employer], DO, 0.4 mg at 10/28/23 1616    temazepam (RESTORIL) capsule 15 mg, 15 mg, Oral, HS PRN, Cele De [place], DO, 15 mg at 10/25/23 2202    zolpidem (AMBIEN) tablet 5 mg, 5 mg, Oral, HS PRN, Cele Verdugo, DO, 5 mg at 10/28/23 2030    Invasive Devices:        Lab Results:   Results from last 7 days   Lab Units 10/29/23  0553 10/28/23  0654 10/27/23  0526 10/26/23  0636 10/24/23  0534 10/23/23  1133   WBC Thousand/uL 8.79  --  6.99 5.54   < > 8.06   HEMOGLOBIN g/dL 7.2*  --  8.9* 8.1*   < > 8.0*   HEMATOCRIT % 22.9*  --  26.2* 24.0*   < > 25.7*   PLATELETS Thousands/uL 101*  --  94* 75*   < > 68*   SODIUM mmol/L 134* 134* 135 134*   < > 135   POTASSIUM mmol/L 4.0 3.7 3.7 3.9   < > 4.5   CHLORIDE mmol/L 101 103 103 104   < > 108   CO2 mmol/L 21 22 21 20*   < > 16*   BUN mg/dL 57* 50* 47* 50*   < > 56*   CREATININE mg/dL 5.44* 4.67* 4.66* 4.85*   < > 4.25*   CALCIUM mg/dL 7.4* 7.4* 7.7* 7.0*   < > 7.7*   MAGNESIUM mg/dL  --   --  1.4*  --   --   --    PHOSPHORUS mg/dL  --   --  3.0  --   --   --    ALK PHOS U/L  --   --   --   --   --  59   ALT U/L  -- --   --   --   --  16   AST U/L  --   --   --   --   --  37    < > = values in this interval not displayed. Portions of the record may have been created with voice recognition software. Occasional wrong word or "sound a like" substitutions may have occurred due to the inherent limitations of voice recognition software. Read the chart carefully and recognize, using context, where substitutions have occurred. If you have any questions, please contact the dictating provider.

## 2023-10-29 NOTE — PLAN OF CARE
Patient is disoriented to time/location, seems more disoriented than a day before, but able to follow commands, low grade fever, mild generalized pain, and denies SOB on room air. Patient had asymptomatic hypotension last night, notified Provider, IVF bolus ordered, and corrected the issue. Of note, BP is soft on continuous IVF and will continue assessing/monitoring the patient.           Problem: Potential for Falls  Goal: Patient will remain free of falls  Description: INTERVENTIONS:  - Educate patient/family on patient safety including physical limitations  - Instruct patient to call for assistance with activity   - Consult OT/PT to assist with strengthening/mobility   - Keep Call bell within reach  - Keep bed low and locked with side rails adjusted as appropriate  - Keep care items and personal belongings within reach  - Initiate and maintain comfort rounds  - Make Fall Risk Sign visible to staff  - Initiate/Maintain bed/chair alarm  Outcome: Progressing     Problem: MOBILITY - ADULT  Goal: Maintain or return to baseline ADL function  Description: INTERVENTIONS:  -  Assess patient's ability to carry out ADLs; assess patient's baseline for ADL function and identify physical deficits which impact ability to perform ADLs (bathing, care of mouth/teeth, toileting, grooming, dressing, etc.)  - Assess/evaluate cause of self-care deficits   - Assess range of motion  - Assess patient's mobility; develop plan if impaired  - Assess patient's need for assistive devices and provide as appropriate  - Encourage maximum independence but intervene and supervise when necessary  - Involve family in performance of ADLs  - Assess for home care needs following discharge   - Consider OT consult to assist with ADL evaluation and planning for discharge  - Provide patient education as appropriate  Outcome: Progressing  Goal: Maintains/Returns to pre admission functional level  Description: INTERVENTIONS:  - Perform BMAT or MOVE assessment daily.   - Set and communicate daily mobility goal to care team and patient/family/caregiver. - Collaborate with rehabilitation services on mobility goals if consulted  - Reposition patient every 2 hours.   - Ambulate patient as needed times a day    - Out of bed for toileting  - Record patient progress and toleration of activity level   Outcome: Progressing     Problem: PAIN - ADULT  Goal: Verbalizes/displays adequate comfort level or baseline comfort level  Description: Interventions:  - Encourage patient to monitor pain and request assistance  - Assess pain using appropriate pain scale  - Administer analgesics based on type and severity of pain and evaluate response  - Implement non-pharmacological measures as appropriate and evaluate response  - Consider cultural and social influences on pain and pain management  - Notify physician/advanced practitioner if interventions unsuccessful or patient reports new pain  Outcome: Progressing     Problem: INFECTION - ADULT  Goal: Absence or prevention of progression during hospitalization  Description: INTERVENTIONS:  - Assess and monitor for signs and symptoms of infection  - Monitor lab/diagnostic results  - Monitor all insertion sites, i.e. indwelling lines, tubes, and drains  - Monitor endotracheal if appropriate and nasal secretions for changes in amount and color  - [place] appropriate cooling/warming therapies per order  - Administer medications as ordered  - Instruct and encourage patient and family to use good hand hygiene technique  - Identify and instruct in appropriate isolation precautions for identified infection/condition  Outcome: Progressing

## 2023-10-29 NOTE — PROGRESS NOTES
[address] [facility]  Progress Note  Name: Malinda Wiley  MRN: [id number]  Unit/Bed#: DC3 870-01 I Date of Admission: 10/23/2023   Date of Service: 10/29/2023 I Hospital Day: 6    Assessment/Plan   * Fall  Assessment & Plan  Past medical history of vertigo, dementia and cirrhosis presenting after multiple falls over last 2 days. After discussion with family, patient has been dizzy for several weeks with poor oral intake along with complaints of urinary frequency and dysuria. Meclizine as needed  Obtain orthostatic vitals  Work-up revealed UTI and bacteremia, treatment as below  PT/OT    MSSA bacteremia  Assessment & Plan  Plan as above  D/w with the patient and his wife who are agreeable to port removal  IR consulted, for port removal tomorrow    UTI (urinary tract infection)  Assessment & Plan  History of UTIs  Patient denies any dysuria however family reports that he has been complaining of dysuria for the last few days with mild confusion  UA with signs of infection  CT A/P with right renal atrophy, moderate right hydronephrosis with right ureteral stent. There is mild diffuse right urothelial thickening and enhancement. There is also mild diffuse thickening of the urinary bladder and adjacent fat stranding. This may be explained by infectious etiology including right ureteritis and cystitis.   Prior CT scan reviewed this similar ureteritis findings however cystitis appears to be new  10/26, 10/27, 10/28 cultures are negative for growth to date  Uine culture is positive for alpha step and MSSA, continue cefazolin  Per urology no indication for stent change  Monitor temps, WBCs      Acute on chronic renal failure   Assessment & Plan  History of CKD with baseline creatinine of 3.3-3.6  Likely TAVON on CKD due to poor po intake and possible contrast nephropathy  Presenting with creatinine of 4.2  Not meeting criteria for TAVON though likely elevated secondary to underlying CKD and poor oral intake  Received IV contrast for trauma scans  Avoid nephrotoxic agents and relative hypotension  Nephrology following, no indication to do HD at this time.   Renal function worse today had a fever and hypotension after yesterday, will increase IVF, d/w nephrology    Anemia  Assessment & Plan  Chronic anemia likely due to CKD and multiple blood draws  Likely worsened due to acute infection  Check anemia studies and transfuse 1 unit of PBRCS 10/25  Blood consent obtained    History of CVA (cerebrovascular accident)  Assessment & Plan  Remote history of CVA  Continue Plavix    Thrombocytopenia ([address])  Assessment & Plan  Lab Results   Component Value Date     (L) 10/29/2023     Chronic at baseline    CKD (chronic kidney disease)  Assessment & Plan  Lab Results   Component Value Date    CREATININE 5.44 (H) 10/29/2023    CREATININE 4.67 (H) 10/28/2023    CREATININE 4.66 (H) 10/27/2023   Plan as above    Dementia ([address])  Assessment & Plan  A&O x4  Outpatient follow-up  Delirium precautions    T2DM (type 2 diabetes mellitus) ([address])  Assessment & Plan  No results found for: "HGBA1C"    Recent Labs     10/28/23  1435 10/28/23  1655 10/28/23  2057 10/29/23  1054   POCGLU 123 110 99 166*         Blood Sugar Average: Last 72 hrs:  (P) [id number]  Not on any oral agents  SSI while inpatient  Monitor glucose closely  Prandial and basal insulin added    History of liver transplant ([address])  Assessment & Plan  History of liver transplant secondary to alcoholic cirrhosis  Continue tacrolimus 1 mg every 12 hours  Follow-up with GI outpatient    Insomnia  Assessment & Plan  Continue temazepam and Ambien which patient has been on for 30 years  Confirmed with PDMP    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 Patient Centered Rounds: I performed bedside rounds with nursing staff today. Discussions with Specialists or Other Care Team Provider: nurse, CM, nephrology, IR    Education and Discussions with Family / Patient:  will update family later today. Total Time Spent on Date of Encounter in care of patient: 45 mins. This time was spent on one or more of the following: performing physical exam; counseling and coordination of care; obtaining or reviewing history; documenting in the medical record; reviewing/ordering tests, medications or procedures; communicating with other healthcare professionals and discussing with patient's family/caregivers. Current Length of Stay: 6 day(s)  Current Patient Status: Inpatient   Certification Statement: The patient will continue to require additional inpatient hospital stay due to worsening renal function  Discharge Plan: Anticipate discharge in >72 hrs to to be determined    Code Status: Level 3 - DNAR and DNI    Subjective:   Denies any new complaints. Objective:     Vitals:   Temp (24hrs), Av.6 °F (37 °C), Min:97.7 °F (36.5 °C), Max:99.2 °F (37.3 °C)    Temp:  [97.7 °F (36.5 °C)-99.2 °F (37.3 °C)] 97.7 °F (36.5 °C)  HR:  [61-93] 61  Resp:  [18-23] 18  BP: ()/(49-62) 102/61  SpO2:  [94 %-97 %] 95 %  Body mass index is 23.9 kg/m². Input and Output Summary (last 24 hours):   No intake or output data in the 24 hours ending 10/29/23 7238    Physical Exam:   Physical Exam  Constitutional:       Appearance: Normal appearance. HENT:      Head: Normocephalic and atraumatic. Nose: Nose normal.   Eyes:      Extraocular Movements: Extraocular movements intact.    Cardiovascular:      Rate and Rhythm: Normal rate and regular rhythm. Pulmonary:      Effort: Pulmonary effort is normal.      Breath sounds: No wheezing or rales. Abdominal:      General: There is no distension. Palpations: Abdomen is soft. Tenderness: There is no abdominal tenderness. Musculoskeletal:      Right lower leg: No edema. Left lower leg: No edema. Skin:     General: Skin is warm and dry. Neurological:      Mental Status: Bev Duncan is alert and oriented to person, place, and time. Psychiatric:         Mood and Affect: Mood normal.         Behavior: Behavior normal.          Additional Data:     Labs:  Results from last 7 days   Lab Units 10/29/23  0553 10/27/23  0526 10/26/23  0636   WBC Thousand/uL 8.79   < > 5.54   HEMOGLOBIN g/dL 7.2*   < > 8.1*   HEMATOCRIT % 22.9*   < > 24.0*   PLATELETS Thousands/uL 101*   < > 75*   NEUTROS PCT %  --   --  77*   LYMPHS PCT %  --   --  11*   MONOS PCT %  --   --  9   EOS PCT %  --   --  2    < > = values in this interval not displayed. Results from last 7 days   Lab Units 10/29/23  0553 10/24/23  0534 10/23/23  1133   SODIUM mmol/L 134*   < > 135   POTASSIUM mmol/L 4.0   < > 4.5   CHLORIDE mmol/L 101   < > 108   CO2 mmol/L 21   < > 16*   BUN mg/dL 57*   < > 56*   CREATININE mg/dL 5.44*   < > 4.25*   ANION GAP mmol/L 12   < > 11   CALCIUM mg/dL 7.4*   < > 7.7*   ALBUMIN g/dL  --   --  3.3*   TOTAL BILIRUBIN mg/dL  --   --  0.44   ALK PHOS U/L  --   --  59   ALT U/L  --   --  16   AST U/L  --   --  37   GLUCOSE RANDOM mg/dL 126   < > 188*    < > = values in this interval not displayed.          Results from last 7 days   Lab Units 10/29/23  1054 10/28/23  2057 10/28/23  1655 10/28/23  1435 10/28/23  1238 10/28/23  0826 10/27/23  2143 10/27/23  1709 10/27/23  1147 10/27/23  0801 10/26/23  2133 10/26/23  1619   POC GLUCOSE mg/dl 166* 99 110 123 144* 116 150* 199* 211* 139 215* 253*         Results from last 7 days   Lab Units 10/29/23  0553 10/26/23  0154   PROCALCITONIN ng/ml 56.62* 4.11* Lines/Drains:  Invasive Devices       Central Venous Catheter Line  Duration             Port A Cath Right -- days              Peripheral Intravenous Line  Duration             Peripheral IV 10/28/23 Left Antecubital <1 day                    Central Line:  Goal for removal: No longer needed. Will place order to discontinue. Imaging: No pertinent imaging reviewed. Recent Cultures (last 7 days):   Results from last 7 days   Lab Units 10/28/23  0603 10/27/23  1620 10/26/23  0155 10/23/23  2216 10/23/23  1534   BLOOD CULTURE  No Growth at 24 hrs. No Growth at 24 hrs. No Growth at 24 hrs. No Growth at 72 hrs.  Staphylococcus aureus*  --    GRAM STAIN RESULT   --   --   --  Gram positive cocci in clusters*  --    URINE CULTURE   --   --   --   --  60,000-69,000 cfu/ml Staphylococcus aureus*  30,000-39,000 cfu/ml Alpha Hemolytic Streptococcus NOT Enterococcus*       Last 24 Hours Medication List:   Current Facility-Administered Medications   Medication Dose Route Frequency Provider Last Rate    acetaminophen  650 mg Oral Q6H PRN Cele Verdugo, DO      atorvastatin  80 mg Oral Daily With [employer], DO      cefazolin  1,000 mg Intravenous Q24H LUCY Sapp 1,000 mg (10/29/23 0344)    clopidogrel  75 mg Oral Daily Cele Verdugo, DO      docusate sodium  100 mg Oral BID Shawn Moody MD      heparin (porcine)  5,000 Units Subcutaneous Q8H [address] Cele Verdugo DO      insulin glargine  10 Units Subcutaneous HS Shawn Moody MD      insulin lispro  1-5 Units Subcutaneous TID AC Cele Verdugo, DO      insulin lispro  3 Units Subcutaneous TID With Meals Shawn Moody MD      meclizine  25 mg Oral Q8H PRN Dimitry Stageatif, DO      multi-electrolyte  100 mL/hr Intravenous Continuous Shawn Moody  mL/hr (10/29/23 1136)    nortriptyline  10 mg Oral HS Cele Verdugo, DO      oxybutynin  5 mg Oral BID Cele Verdugo, DO      oxyCODONE  2.5 mg Oral Q4H PRN Marciano Anderson MD      pantoprazole  40 mg Oral Daily Cele Verdugo, DO      pregabalin  75 mg Oral BID Cele Verdugo, DO      senna  2 tablet Oral Once Marciano Anderson MD      sodium bicarbonate  650 mg Oral BID after meals Alexandra Carvalho MD      sucralfate  1 g Oral 4x Daily (AC & HS) Cele Verdugo, DO      tacrolimus  1 mg Oral Q12H [address] Cele Verdugo, DO      tamsulosin  0.4 mg Oral Daily With [employer], DO      temazepam  15 mg Oral HS PRN Cele Verdugo, DO      zolpidem  5 mg Oral HS PRN Armand Mckinnoning, DO          Today, Patient Was Seen By: Cruz Carlson MD    **Please Note: This note may have been constructed using a voice recognition system. **

## 2023-10-30 ENCOUNTER — APPOINTMENT (INPATIENT)
Dept: RADIOLOGY | Facility: HOSPITAL | Age: 73
DRG: 871 | End: 2023-10-30
Payer: COMMERCIAL

## 2023-10-30 ENCOUNTER — APPOINTMENT (INPATIENT)
Dept: RADIOLOGY | Facility: HOSPITAL | Age: 73
DRG: 871 | End: 2023-10-30
Attending: RADIOLOGY
Payer: COMMERCIAL

## 2023-10-30 ENCOUNTER — TELEPHONE (OUTPATIENT)
Dept: NEPHROLOGY | Facility: CLINIC | Age: 73
End: 2023-10-30

## 2023-10-30 PROBLEM — R65.21 SEPTIC SHOCK (HCC): Status: ACTIVE | Noted: 2023-10-30

## 2023-10-30 PROBLEM — A41.9 SEPTIC SHOCK (HCC): Status: ACTIVE | Noted: 2023-10-30

## 2023-10-30 LAB
ABO GROUP BLD: NORMAL
ANION GAP SERPL CALCULATED.3IONS-SCNC: 13 MMOL/L
ANION GAP SERPL CALCULATED.3IONS-SCNC: 14 MMOL/L
ANION GAP SERPL CALCULATED.3IONS-SCNC: 15 MMOL/L
BACTERIA UR QL AUTO: ABNORMAL /HPF
BILIRUB UR QL STRIP: NEGATIVE
BLD GP AB SCN SERPL QL: NEGATIVE
BUN SERPL-MCNC: 70 MG/DL (ref 5–25)
BUN SERPL-MCNC: 72 MG/DL (ref 5–25)
BUN SERPL-MCNC: 73 MG/DL (ref 5–25)
CALCIUM SERPL-MCNC: 7.1 MG/DL (ref 8.4–10.2)
CALCIUM SERPL-MCNC: 7.2 MG/DL (ref 8.4–10.2)
CALCIUM SERPL-MCNC: 7.9 MG/DL (ref 8.4–10.2)
CHLORIDE SERPL-SCNC: 100 MMOL/L (ref 96–108)
CHLORIDE SERPL-SCNC: 97 MMOL/L (ref 96–108)
CHLORIDE SERPL-SCNC: 99 MMOL/L (ref 96–108)
CLARITY UR: ABNORMAL
CO2 SERPL-SCNC: 20 MMOL/L (ref 21–32)
CO2 SERPL-SCNC: 20 MMOL/L (ref 21–32)
CO2 SERPL-SCNC: 22 MMOL/L (ref 21–32)
COLOR UR: ABNORMAL
CREAT SERPL-MCNC: 6.24 MG/DL (ref 0.6–1.3)
CREAT SERPL-MCNC: 6.4 MG/DL (ref 0.6–1.3)
CREAT SERPL-MCNC: 6.52 MG/DL (ref 0.6–1.3)
ERYTHROCYTE [DISTWIDTH] IN BLOOD BY AUTOMATED COUNT: 15 % (ref 11.6–15.1)
FLUAV RNA RESP QL NAA+PROBE: NEGATIVE
FLUBV RNA RESP QL NAA+PROBE: NEGATIVE
GLUCOSE SERPL-MCNC: 100 MG/DL (ref 65–140)
GLUCOSE SERPL-MCNC: 115 MG/DL (ref 65–140)
GLUCOSE SERPL-MCNC: 152 MG/DL (ref 65–140)
GLUCOSE SERPL-MCNC: 163 MG/DL (ref 65–140)
GLUCOSE SERPL-MCNC: 56 MG/DL (ref 65–140)
GLUCOSE SERPL-MCNC: 88 MG/DL (ref 65–140)
GLUCOSE UR STRIP-MCNC: ABNORMAL MG/DL
HCT VFR BLD AUTO: 29.6 % (ref 36.5–46.1)
HGB BLD-MCNC: 9.4 G/DL (ref 12–15.4)
HGB UR QL STRIP.AUTO: ABNORMAL
INR PPP: 1.27 (ref 0.84–1.19)
KETONES UR STRIP-MCNC: NEGATIVE MG/DL
LACTATE SERPL-SCNC: 1.1 MMOL/L (ref 0.5–2)
LACTATE SERPL-SCNC: 1.6 MMOL/L (ref 0.5–2)
LACTATE SERPL-SCNC: 2.7 MMOL/L (ref 0.5–2)
LEUKOCYTE ESTERASE UR QL STRIP: ABNORMAL
MAGNESIUM SERPL-MCNC: 2 MG/DL (ref 1.9–2.7)
MCH RBC QN AUTO: 24.3 PG (ref 26.8–34.3)
MCHC RBC AUTO-ENTMCNC: 31.8 G/DL (ref 31.4–37.4)
MCV RBC AUTO: 77 FL (ref 82–98)
NITRITE UR QL STRIP: NEGATIVE
NON-SQ EPI CELLS URNS QL MICRO: ABNORMAL /HPF
PH UR STRIP.AUTO: 6 [PH]
PLATELET # BLD AUTO: 113 THOUSANDS/UL (ref 149–390)
PLATELET # BLD AUTO: 77 THOUSANDS/UL (ref 149–390)
PMV BLD AUTO: 12.9 FL (ref 8.9–12.7)
POTASSIUM SERPL-SCNC: 3.1 MMOL/L (ref 3.5–5.3)
POTASSIUM SERPL-SCNC: 4 MMOL/L (ref 3.5–5.3)
POTASSIUM SERPL-SCNC: 5.1 MMOL/L (ref 3.5–5.3)
PROCALCITONIN SERPL-MCNC: 49.65 NG/ML
PROT UR STRIP-MCNC: ABNORMAL MG/DL
PROTHROMBIN TIME: 15.8 SECONDS (ref 11.6–14.5)
RBC # BLD AUTO: 3.87 MILLION/UL (ref 3.88–5.12)
RBC #/AREA URNS AUTO: ABNORMAL /HPF
RH BLD: POSITIVE
RSV RNA RESP QL NAA+PROBE: NEGATIVE
SARS-COV-2 RNA RESP QL NAA+PROBE: NEGATIVE
SODIUM SERPL-SCNC: 132 MMOL/L (ref 135–147)
SODIUM SERPL-SCNC: 132 MMOL/L (ref 135–147)
SODIUM SERPL-SCNC: 136 MMOL/L (ref 135–147)
SP GR UR STRIP.AUTO: 1.01 (ref 1–1.03)
SPECIMEN EXPIRATION DATE: NORMAL
TACROLIMUS BLD-MCNC: 7.6 NG/ML (ref 3–15)
UROBILINOGEN UR STRIP-ACNC: <2 MG/DL
WBC # BLD AUTO: 2.62 THOUSAND/UL (ref 4.31–10.16)
WBC #/AREA URNS AUTO: ABNORMAL /HPF
WBC CLUMPS # UR AUTO: PRESENT /UL

## 2023-10-30 PROCEDURE — 80048 BASIC METABOLIC PNL TOTAL CA: CPT

## 2023-10-30 PROCEDURE — 85610 PROTHROMBIN TIME: CPT | Performed by: PHYSICIAN ASSISTANT

## 2023-10-30 PROCEDURE — 86850 RBC ANTIBODY SCREEN: CPT | Performed by: INTERNAL MEDICINE

## 2023-10-30 PROCEDURE — 99291 CRITICAL CARE FIRST HOUR: CPT | Performed by: STUDENT IN AN ORGANIZED HEALTH CARE EDUCATION/TRAINING PROGRAM

## 2023-10-30 PROCEDURE — 99233 SBSQ HOSP IP/OBS HIGH 50: CPT | Performed by: INTERNAL MEDICINE

## 2023-10-30 PROCEDURE — 80048 BASIC METABOLIC PNL TOTAL CA: CPT | Performed by: STUDENT IN AN ORGANIZED HEALTH CARE EDUCATION/TRAINING PROGRAM

## 2023-10-30 PROCEDURE — 83605 ASSAY OF LACTIC ACID: CPT

## 2023-10-30 PROCEDURE — 86901 BLOOD TYPING SEROLOGIC RH(D): CPT | Performed by: INTERNAL MEDICINE

## 2023-10-30 PROCEDURE — 99233 SBSQ HOSP IP/OBS HIGH 50: CPT | Performed by: STUDENT IN AN ORGANIZED HEALTH CARE EDUCATION/TRAINING PROGRAM

## 2023-10-30 PROCEDURE — 87070 CULTURE OTHR SPECIMN AEROBIC: CPT | Performed by: STUDENT IN AN ORGANIZED HEALTH CARE EDUCATION/TRAINING PROGRAM

## 2023-10-30 PROCEDURE — 86923 COMPATIBILITY TEST ELECTRIC: CPT

## 2023-10-30 PROCEDURE — 85027 COMPLETE CBC AUTOMATED: CPT | Performed by: PHYSICIAN ASSISTANT

## 2023-10-30 PROCEDURE — 80197 ASSAY OF TACROLIMUS: CPT

## 2023-10-30 PROCEDURE — 82948 REAGENT STRIP/BLOOD GLUCOSE: CPT

## 2023-10-30 PROCEDURE — 85027 COMPLETE CBC AUTOMATED: CPT

## 2023-10-30 PROCEDURE — 02HV33Z INSERTION OF INFUSION DEVICE INTO SUPERIOR VENA CAVA, PERCUTANEOUS APPROACH: ICD-10-PCS | Performed by: STUDENT IN AN ORGANIZED HEALTH CARE EDUCATION/TRAINING PROGRAM

## 2023-10-30 PROCEDURE — 99232 SBSQ HOSP IP/OBS MODERATE 35: CPT | Performed by: UROLOGY

## 2023-10-30 PROCEDURE — 36556 INSERT NON-TUNNEL CV CATH: CPT | Performed by: STUDENT IN AN ORGANIZED HEALTH CARE EDUCATION/TRAINING PROGRAM

## 2023-10-30 PROCEDURE — 87081 CULTURE SCREEN ONLY: CPT | Performed by: PHYSICIAN ASSISTANT

## 2023-10-30 PROCEDURE — 87040 BLOOD CULTURE FOR BACTERIA: CPT

## 2023-10-30 PROCEDURE — 83735 ASSAY OF MAGNESIUM: CPT

## 2023-10-30 PROCEDURE — 71045 X-RAY EXAM CHEST 1 VIEW: CPT

## 2023-10-30 PROCEDURE — 85007 BL SMEAR W/DIFF WBC COUNT: CPT

## 2023-10-30 PROCEDURE — NC001 PR NO CHARGE: Performed by: STUDENT IN AN ORGANIZED HEALTH CARE EDUCATION/TRAINING PROGRAM

## 2023-10-30 PROCEDURE — 87086 URINE CULTURE/COLONY COUNT: CPT

## 2023-10-30 PROCEDURE — 36590 REMOVAL TUNNELED CV CATH: CPT | Performed by: RADIOLOGY

## 2023-10-30 PROCEDURE — 83605 ASSAY OF LACTIC ACID: CPT | Performed by: PHYSICIAN ASSISTANT

## 2023-10-30 PROCEDURE — 84145 PROCALCITONIN (PCT): CPT | Performed by: PHYSICIAN ASSISTANT

## 2023-10-30 PROCEDURE — NC001 PR NO CHARGE: Performed by: PHYSICIAN ASSISTANT

## 2023-10-30 PROCEDURE — 80048 BASIC METABOLIC PNL TOTAL CA: CPT | Performed by: PHYSICIAN ASSISTANT

## 2023-10-30 PROCEDURE — 81001 URINALYSIS AUTO W/SCOPE: CPT

## 2023-10-30 PROCEDURE — 85049 AUTOMATED PLATELET COUNT: CPT

## 2023-10-30 PROCEDURE — 86900 BLOOD TYPING SEROLOGIC ABO: CPT | Performed by: INTERNAL MEDICINE

## 2023-10-30 PROCEDURE — 0241U HB NFCT DS VIR RESP RNA 4 TRGT: CPT | Performed by: PHYSICIAN ASSISTANT

## 2023-10-30 PROCEDURE — 36590 REMOVAL TUNNELED CV CATH: CPT

## 2023-10-30 RX ORDER — ONDANSETRON 2 MG/ML
INJECTION INTRAMUSCULAR; INTRAVENOUS
Status: COMPLETED
Start: 2023-10-30 | End: 2023-10-30

## 2023-10-30 RX ORDER — DEXTROSE 50 % IN WATER 50 %
SYRINGE (ML) INTRAVENOUS CODE/TRAUMA/SEDATION MEDICATION
Status: COMPLETED | OUTPATIENT
Start: 2023-10-30 | End: 2023-10-30

## 2023-10-30 RX ORDER — GUAIFENESIN/DEXTROMETHORPHAN 100-10MG/5
10 SYRUP ORAL EVERY 4 HOURS PRN
Status: DISCONTINUED | OUTPATIENT
Start: 2023-10-30 | End: 2023-11-03 | Stop reason: HOSPADM

## 2023-10-30 RX ORDER — SODIUM CHLORIDE, SODIUM GLUCONATE, SODIUM ACETATE, POTASSIUM CHLORIDE, MAGNESIUM CHLORIDE, SODIUM PHOSPHATE, DIBASIC, AND POTASSIUM PHOSPHATE .53; .5; .37; .037; .03; .012; .00082 G/100ML; G/100ML; G/100ML; G/100ML; G/100ML; G/100ML; G/100ML
500 INJECTION, SOLUTION INTRAVENOUS ONCE
Status: COMPLETED | OUTPATIENT
Start: 2023-10-30 | End: 2023-10-30

## 2023-10-30 RX ORDER — SODIUM CHLORIDE, SODIUM GLUCONATE, SODIUM ACETATE, POTASSIUM CHLORIDE, MAGNESIUM CHLORIDE, SODIUM PHOSPHATE, DIBASIC, AND POTASSIUM PHOSPHATE .53; .5; .37; .037; .03; .012; .00082 G/100ML; G/100ML; G/100ML; G/100ML; G/100ML; G/100ML; G/100ML
1000 INJECTION, SOLUTION INTRAVENOUS ONCE
Status: COMPLETED | OUTPATIENT
Start: 2023-10-30 | End: 2023-10-30

## 2023-10-30 RX ORDER — SODIUM BICARBONATE 650 MG/1
650 TABLET ORAL
Status: DISCONTINUED | OUTPATIENT
Start: 2023-10-30 | End: 2023-11-01

## 2023-10-30 RX ORDER — SODIUM CHLORIDE, SODIUM GLUCONATE, SODIUM ACETATE, POTASSIUM CHLORIDE, MAGNESIUM CHLORIDE, SODIUM PHOSPHATE, DIBASIC, AND POTASSIUM PHOSPHATE .53; .5; .37; .037; .03; .012; .00082 G/100ML; G/100ML; G/100ML; G/100ML; G/100ML; G/100ML; G/100ML
INJECTION, SOLUTION INTRAVENOUS
Status: COMPLETED | OUTPATIENT
Start: 2023-10-30 | End: 2023-10-30

## 2023-10-30 RX ORDER — CHLORHEXIDINE GLUCONATE ORAL RINSE 1.2 MG/ML
15 SOLUTION DENTAL EVERY 12 HOURS SCHEDULED
Status: DISCONTINUED | OUTPATIENT
Start: 2023-10-30 | End: 2023-11-03 | Stop reason: HOSPADM

## 2023-10-30 RX ORDER — DEXTROSE MONOHYDRATE 25 G/50ML
INJECTION, SOLUTION INTRAVENOUS
Status: DISPENSED
Start: 2023-10-30 | End: 2023-10-30

## 2023-10-30 RX ORDER — ACETAMINOPHEN 325 MG/1
975 TABLET ORAL EVERY 6 HOURS PRN
Status: DISCONTINUED | OUTPATIENT
Start: 2023-10-30 | End: 2023-11-03 | Stop reason: HOSPADM

## 2023-10-30 RX ORDER — ACETAMINOPHEN 325 MG/1
325 TABLET ORAL ONCE
Status: COMPLETED | OUTPATIENT
Start: 2023-10-30 | End: 2023-10-30

## 2023-10-30 RX ORDER — LIDOCAINE HYDROCHLORIDE AND EPINEPHRINE 10; 10 MG/ML; UG/ML
INJECTION, SOLUTION INFILTRATION; PERINEURAL AS NEEDED
Status: COMPLETED | OUTPATIENT
Start: 2023-10-30 | End: 2023-10-30

## 2023-10-30 RX ORDER — ACETAMINOPHEN 325 MG/1
975 TABLET ORAL EVERY 6 HOURS PRN
Status: DISCONTINUED | OUTPATIENT
Start: 2023-10-30 | End: 2023-10-30

## 2023-10-30 RX ORDER — VANCOMYCIN HYDROCHLORIDE 1 G/200ML
15 INJECTION, SOLUTION INTRAVENOUS DAILY PRN
Status: DISCONTINUED | OUTPATIENT
Start: 2023-10-30 | End: 2023-10-31

## 2023-10-30 RX ORDER — POTASSIUM CHLORIDE 20 MEQ/1
40 TABLET, EXTENDED RELEASE ORAL ONCE
Status: COMPLETED | OUTPATIENT
Start: 2023-10-30 | End: 2023-10-30

## 2023-10-30 RX ORDER — POTASSIUM CHLORIDE 29.8 MG/ML
40 INJECTION INTRAVENOUS ONCE
Status: COMPLETED | OUTPATIENT
Start: 2023-10-30 | End: 2023-10-30

## 2023-10-30 RX ORDER — ONDANSETRON 2 MG/ML
4 INJECTION INTRAMUSCULAR; INTRAVENOUS EVERY 4 HOURS PRN
Status: DISCONTINUED | OUTPATIENT
Start: 2023-10-30 | End: 2023-11-03 | Stop reason: HOSPADM

## 2023-10-30 RX ADMIN — DOCUSATE SODIUM 100 MG: 100 CAPSULE ORAL at 10:31

## 2023-10-30 RX ADMIN — ZOLPIDEM TARTRATE 5 MG: 5 TABLET ORAL at 22:02

## 2023-10-30 RX ADMIN — POTASSIUM CHLORIDE 40 MEQ: 1500 TABLET, EXTENDED RELEASE ORAL at 14:16

## 2023-10-30 RX ADMIN — SODIUM CHLORIDE, SODIUM GLUCONATE, SODIUM ACETATE, POTASSIUM CHLORIDE, MAGNESIUM CHLORIDE, SODIUM PHOSPHATE, DIBASIC, AND POTASSIUM PHOSPHATE 1000 ML: .53; .5; .37; .037; .03; .012; .00082 INJECTION, SOLUTION INTRAVENOUS at 09:36

## 2023-10-30 RX ADMIN — ONDANSETRON 4 MG: 2 INJECTION INTRAMUSCULAR; INTRAVENOUS at 05:03

## 2023-10-30 RX ADMIN — SODIUM CHLORIDE, SODIUM GLUCONATE, SODIUM ACETATE, POTASSIUM CHLORIDE, MAGNESIUM CHLORIDE, SODIUM PHOSPHATE, DIBASIC, AND POTASSIUM PHOSPHATE 500 ML: .53; .5; .37; .037; .03; .012; .00082 INJECTION, SOLUTION INTRAVENOUS at 08:10

## 2023-10-30 RX ADMIN — POTASSIUM CHLORIDE 40 MEQ: 29.8 INJECTION, SOLUTION INTRAVENOUS at 14:15

## 2023-10-30 RX ADMIN — NOREPINEPHRINE BITARTRATE 5 MCG/MIN: 1 INJECTION, SOLUTION, CONCENTRATE INTRAVENOUS at 09:35

## 2023-10-30 RX ADMIN — INSULIN GLARGINE 10 UNITS: 100 INJECTION, SOLUTION SUBCUTANEOUS at 21:50

## 2023-10-30 RX ADMIN — SODIUM BICARBONATE 650 MG TABLET 650 MG: at 18:04

## 2023-10-30 RX ADMIN — SODIUM CHLORIDE, SODIUM GLUCONATE, SODIUM ACETATE, POTASSIUM CHLORIDE, MAGNESIUM CHLORIDE, SODIUM PHOSPHATE, DIBASIC, AND POTASSIUM PHOSPHATE 500 ML: .53; .5; .37; .037; .03; .012; .00082 INJECTION, SOLUTION INTRAVENOUS at 04:42

## 2023-10-30 RX ADMIN — ACETAMINOPHEN 325 MG: 325 TABLET, FILM COATED ORAL at 03:55

## 2023-10-30 RX ADMIN — ATORVASTATIN CALCIUM 80 MG: 80 TABLET, FILM COATED ORAL at 18:03

## 2023-10-30 RX ADMIN — TEMAZEPAM 15 MG: 15 CAPSULE ORAL at 22:02

## 2023-10-30 RX ADMIN — CHLORHEXIDINE GLUCONATE 15 ML: 1.2 SOLUTION ORAL at 10:30

## 2023-10-30 RX ADMIN — LIDOCAINE HYDROCHLORIDE,EPINEPHRINE BITARTRATE 10 ML: 10; .01 INJECTION, SOLUTION INFILTRATION; PERINEURAL at 17:40

## 2023-10-30 RX ADMIN — CEFAZOLIN SODIUM 1000 MG: 1 SOLUTION INTRAVENOUS at 03:25

## 2023-10-30 RX ADMIN — PANTOPRAZOLE SODIUM 40 MG: 40 TABLET, DELAYED RELEASE ORAL at 10:30

## 2023-10-30 RX ADMIN — VANCOMYCIN HYDROCHLORIDE 1500 MG: 1 INJECTION, POWDER, LYOPHILIZED, FOR SOLUTION INTRAVENOUS at 11:49

## 2023-10-30 RX ADMIN — SODIUM CHLORIDE, SODIUM GLUCONATE, SODIUM ACETATE, POTASSIUM CHLORIDE, MAGNESIUM CHLORIDE, SODIUM PHOSPHATE, DIBASIC, AND POTASSIUM PHOSPHATE 500 ML: .53; .5; .37; .037; .03; .012; .00082 INJECTION, SOLUTION INTRAVENOUS at 12:22

## 2023-10-30 RX ADMIN — SODIUM BICARBONATE 650 MG TABLET 650 MG: at 10:24

## 2023-10-30 RX ADMIN — DOCUSATE SODIUM 100 MG: 100 CAPSULE ORAL at 18:03

## 2023-10-30 RX ADMIN — NORTRIPTYLINE HYDROCHLORIDE 10 MG: 10 CAPSULE ORAL at 21:52

## 2023-10-30 RX ADMIN — CLOPIDOGREL BISULFATE 75 MG: 75 TABLET ORAL at 10:30

## 2023-10-30 RX ADMIN — TACROLIMUS 1 MG: 1 CAPSULE ORAL at 10:24

## 2023-10-30 RX ADMIN — HEPARIN SODIUM 5000 UNITS: 5000 INJECTION INTRAVENOUS; SUBCUTANEOUS at 21:50

## 2023-10-30 RX ADMIN — SUCRALFATE 1 G: 1 TABLET ORAL at 18:04

## 2023-10-30 RX ADMIN — GUAIFENESIN AND DEXTROMETHORPHAN 10 ML: 100; 10 SYRUP ORAL at 04:42

## 2023-10-30 RX ADMIN — TACROLIMUS 1 MG: 1 CAPSULE ORAL at 21:52

## 2023-10-30 RX ADMIN — PREGABALIN 75 MG: 50 CAPSULE ORAL at 10:30

## 2023-10-30 RX ADMIN — NOREPINEPHRINE BITARTRATE 6 MCG/MIN: 1 INJECTION INTRAVENOUS at 08:19

## 2023-10-30 RX ADMIN — OXYBUTYNIN CHLORIDE 5 MG: 5 TABLET ORAL at 10:25

## 2023-10-30 RX ADMIN — OXYBUTYNIN CHLORIDE 5 MG: 5 TABLET ORAL at 18:04

## 2023-10-30 RX ADMIN — HEPARIN SODIUM 5000 UNITS: 5000 INJECTION INTRAVENOUS; SUBCUTANEOUS at 14:16

## 2023-10-30 RX ADMIN — ACETAMINOPHEN 650 MG: 325 TABLET, FILM COATED ORAL at 03:35

## 2023-10-30 RX ADMIN — HEPARIN SODIUM 5000 UNITS: 5000 INJECTION INTRAVENOUS; SUBCUTANEOUS at 05:05

## 2023-10-30 RX ADMIN — CEFEPIME 1000 MG: 1 INJECTION, POWDER, FOR SOLUTION INTRAMUSCULAR; INTRAVENOUS at 11:09

## 2023-10-30 RX ADMIN — NOREPINEPHRINE BITARTRATE 6 MCG/MIN: 1 INJECTION, SOLUTION, CONCENTRATE INTRAVENOUS at 09:00

## 2023-10-30 RX ADMIN — PREGABALIN 75 MG: 50 CAPSULE ORAL at 18:04

## 2023-10-30 RX ADMIN — DEXTROSE MONOHYDRATE 25 G: 500 INJECTION PARENTERAL at 08:17

## 2023-10-30 RX ADMIN — SUCRALFATE 1 G: 1 TABLET ORAL at 21:50

## 2023-10-30 RX ADMIN — CHLORHEXIDINE GLUCONATE 15 ML: 1.2 SOLUTION ORAL at 21:50

## 2023-10-30 NOTE — SEDATION DOCUMENTATION
Bedside port removal completed by Dr. Justin Meléndez without complications. Patient tolerated well. Right chest site sutured and closed with exofin glue. Port sent for cultures.

## 2023-10-30 NOTE — OCCUPATIONAL THERAPY NOTE
OT CANCEL NOTE:    Unable to see patient for OT treatment this AM due to medical status. Will continue to follow and treat as able.

## 2023-10-30 NOTE — RAPID RESPONSE
Rapid Response Note  Maya Rothman 74 y.o. adult MRN: [id number]  Unit/Bed#: [facility] 09 Encounter: [id number]    Rapid Response Notification(s):   Response called date/time:  10/30/2023 8:09 AM  Response team arrival date/time:  10/30/2023 8:10 AM  Response end date/time:  10/30/2023 8:25 AM  Level of care:  [facility]  Rapid response location:  [facility] unit  Primary reason for rapid response call:  Acute change in BP    Rapid Response Intervention(s):   Airway:  None  Breathing:  None  Fluids administered:  Normal saline  Medications administered: levophed. Assessment:   Hypotension in the setting of MSSA bacteremia and UTI    Plan:   1000 ml Fluids provided with no response. Increased to levophed. Stat Labs sent. Admit MICU- consider broadening and urology consultation with stent in place     Rapid Response Outcome:   Transfer:  Transfer to ICU  Primary service notified of transfer: Yes    Code Status: Level 3 (DNAR and DNI)      Family notified of transfer: yes  Family member contacted: Wife     Background/Situation:   Maya Rothman is a 74 y.o. adult who is currently admitted for MSSA bacteremia. Acutely hypotensive and unresponsive to fluid bolus. Added levo, transfer to MICU. Please see ICU accept note. Review of Systems   Constitutional:  Negative for chills and fever. HENT:  Negative for ear pain and sore throat. Eyes:  Negative for pain and visual disturbance. Respiratory:  Negative for cough and shortness of breath. Cardiovascular:  Negative for chest pain and palpitations. Gastrointestinal:  Negative for abdominal pain and vomiting. Genitourinary:  Negative for dysuria and hematuria. Musculoskeletal:  Negative for arthralgias and back pain. Skin:  Negative for color change and rash. Neurological:  Negative for seizures and syncope. All other systems reviewed and are negative.       Objective:   Vitals:    10/30/23 0819 10/30/23 0822 10/30/23 0835 10/30/23 0845   BP: (!) 63/48 95/56 117/86 130/69   BP Location: Right arm  Right arm    Pulse: 101  102 100   Resp: 18  (!) 28 (!) 26   Temp:       TempSrc:       SpO2: 97%  100% 100%   Weight:       Height:         Physical Exam  Constitutional:       Appearance: Normal appearance. HENT:      Head: Normocephalic. Nose: Nose normal.      Mouth/Throat:      Mouth: Mucous membranes are moist.      Pharynx: Oropharynx is clear. Eyes:      Extraocular Movements: Extraocular movements intact. Pupils: Pupils are equal, round, and reactive to light. Cardiovascular:      Rate and Rhythm: Normal rate and regular rhythm. Pulses: Normal pulses. Pulmonary:      Effort: Pulmonary effort is normal.   Abdominal:      General: Abdomen is flat. Palpations: Abdomen is soft. Musculoskeletal:         General: Normal range of motion. Cervical back: Normal range of motion. Skin:     General: Skin is warm. Capillary Refill: Capillary refill takes less than 2 seconds. Neurological:      General: No focal deficit present. Mental Status: Cecilia Roche is alert and oriented to person, place, and time. Mental status is at baseline. Portions of the record may have been created with voice recognition software. Occasional wrong word or "sound a like" substitutions may have occurred due to the inherent limitations of voice recognition software. Read the chart carefully and recognize, using context, where substitutions have occurred.     Pushpa Warner PA-C

## 2023-10-30 NOTE — PROGRESS NOTES
NEPHROLOGY PROGRESS NOTE   Cecilia Roche 74 y.o. adult MRN: [id number]  Unit/Bed#: [facility] 09 Encounter: [id number]  Reason for Consult: TAVON    ASSESSMENT AND PLAN:  TAVON on CKD stage IV, baseline creatinine around 3.6, follows with Dr. Vivian Rivas  -TAVON suspect secondary to ATN, UTI, MSSA bacteremia, hypotension now requiring vasopressors, this is in the setting of right renal atrophy with moderate right hydronephrosis  -Patient now remains on Levophed  -Now creatinine increasing up to 6.4 today. Urine output lower.  -Agree with IV fluid resuscitation due to developing septic shock, consider eventual IV bicarb drip.  -I had detailed discussion with patient and his significant other Josefa (POA) at bedside regarding overall worsening renal failure, need for dialysis in next 24 hours  -Patient is agreeable for dialysis and has provided verbal consent although since he is feeling weak he would prefer his significant other to sign dialysis consent. Significant other Josefa (POA) ordered dialysis consent which is in the chart.  -Continue to closely monitor for need for dialysis. -BMP in a.m. Right renal atrophy with moderate right hydronephrosis, status post right ureteral stent, follows with urology.  -Patient earlier had refused Mcallister catheter although now agreeable, would recommend to place Mcallister catheter as per urology.  -Consider urology reevaluation if any role for further urological intervention in terms of decompression    Bladder cancer, status post TURBT in 2022 with muscle invasive bladder cancer, initially required nephrostomy tube eventually transition to PCNU and now with internalized stent. 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  -Urology following. As per medical records review, did not tolerate chemo due to toxicity. Post radiation. MSSA bacteremia suspect in the setting of ascending UTI, status post right ureteral stent, currently antibiotic as per ID.     Hypotension, septic shock in the setting of bacteremia, UTI, now remains on Levophed as per ICU. Antibiotic as per ID. History of liver transplant about 20 years ago as per patient. Now remains on tacrolimus. Consider repeat tacrolimus trough level. Metabolic acidosis, bicarb level over 20, lactic acid level 1.6 normal.  -Currently on p.o. sodium bicarb supplement. Consider IV bicarb drip. Discussed above plan in detail with ICU team regarding considering bicarb drip, urology reevaluation, Mcallister catheter placement, monitoring BMP and they agree with above recommendations. SUBJECTIVE:  Patient seen and examined at bedside. Patient had hypotension episodes now transferred to ICU and remains on Levophed. Denies any nausea or vomiting currently. Patient is primarily Indonesian-speaking but does understand some [place].   Also his significant other present at bedside who helps with translation as per patient's request.    OBJECTIVE:  Current Weight: Weight - Scale: 62.3 kg (137 lb 5.6 oz)  Vitals:    10/30/23 1100   BP: 106/56   Pulse: 100   Resp: (!) 25   Temp:    SpO2: 100%       Intake/Output Summary (Last 24 hours) at 10/30/2023 1130  Last data filed at 10/30/2023 1119  Gross per 24 hour   Intake 2095.02 ml   Output 305 ml   Net 1790.02 ml     Wt Readings from Last 3 Encounters:   10/30/23 62.3 kg (137 lb 5.6 oz)     Temp Readings from Last 3 Encounters:   10/30/23 99.5 °F (37.5 °C) (Oral)     BP Readings from Last 3 Encounters:   10/30/23 106/56     Pulse Readings from Last 3 Encounters:   10/30/23 100        Physical Examination:  Eyes: Mild conjunctival pallor present  Neck: No obvious lymphadenopathy appreciated  Respiratory: Bilateral air entry present  CVS: no Significant edema  GI: Soft, nondistended  CNS: Active, alert, oriented  Skin: No new rash  Musculoskeletal: No obvious new gross deformity noted    Medications:    Current Facility-Administered Medications:     acetaminophen (TYLENOL) tablet 975 mg, 975 mg, Oral, Q6H PRN, Laury Card, AISHA    atorvastatin (LIPITOR) tablet 80 mg, 80 mg, Oral, Daily With Dinner, [place] Dan[employer] Strong, DO, 80 mg at 10/29/23 1709    cefepime (MAXIPIME) 1,000 mg in dextrose 5 % 50 mL IVPB, 1,000 mg, Intravenous, Q24H, Nahid Dee , Last Rate: 100 mL/hr at 10/30/23 1109, 1,000 mg at 10/30/23 1109    chlorhexidine (PERIDEX) 0.12 % oral rinse 15 mL, 15 mL, Mouth/Throat, Q12H [address], Lyn Haily Conforti, DO, 15 mL at 10/30/23 1030    clopidogrel (PLAVIX) tablet 75 mg, 75 mg, Oral, Daily, [facility], 75 mg at 10/30/23 1030    dextromethorphan-guaiFENesin (ROBITUSSIN DM) oral syrup 10 mL, 10 mL, Oral, Q4H PRN, Abby Kimble PA-C, 10 mL at 10/30/23 0442    dextrose 50 % IV solution **ADS Override Pull**, , , ,     docusate sodium (COLACE) capsule 100 mg, 100 mg, Oral, BID, Sheryle Pais, MD, 100 mg at 10/30/23 1031    heparin (porcine) subcutaneous injection 5,000 Units, 5,000 Units, Subcutaneous, Q8H [address], [facility], 5,000 Units at 10/30/23 0505    insulin glargine (LANTUS) subcutaneous injection 10 Units 0.1 mL, 10 Units, Subcutaneous, HS, Sheryle Pais, MD, 10 Units at 10/29/23 2118    insulin lispro (HumaLOG) 100 units/mL subcutaneous injection 1-5 Units, 1-5 Units, Subcutaneous, TID AC, 1 Units at 10/29/23 1710 **AND** Fingerstick Glucose (POCT), , , TID AC, [facility],     insulin lispro (HumaLOG) 100 units/mL subcutaneous injection 3 Units, 3 Units, Subcutaneous, TID With Meals, Sheryle Pais, MD, 3 Units at 10/29/23 1710    meclizine (ANTIVERT) tablet 25 mg, 25 mg, Oral, Q8H PRN, Cele Verdugo DO    norepinephrine (LEVOPHED) 4 mg (STANDARD CONCENTRATION) IV in sodium chloride 0.9% 250 mL, 1-30 mcg/min, Intravenous, Titrated, Lyn Castellanos DO, Last Rate: 18.8 mL/hr at 10/30/23 0935, 5 mcg/min at 10/30/23 0935    nortriptyline (PAMELOR) capsule 10 mg, 10 mg, Oral, HS, Cele Verdugo DO, 10 mg at 10/29/23 2009    ondansetron (ZOFRAN) injection 4 mg, 4 mg, Intravenous, Q4H PRN, Prosper Morales PA-C, 4 mg at 10/30/23 0503    oxybutynin (DITROPAN) tablet 5 mg, 5 mg, Oral, BID, Cele De La Fuenteel, DO, 5 mg at 10/30/23 1025    oxyCODONE (ROXICODONE) split tablet 2.5 mg, 2.5 mg, Oral, Q4H PRN, Lisa Bruno MD, 2.5 mg at 10/26/23 1406    pantoprazole (PROTONIX) EC tablet 40 mg, 40 mg, Oral, Daily, Cele De La Fuenteel, DO, 40 mg at 10/30/23 1030    pregabalin (LYRICA) capsule 75 mg, 75 mg, Oral, BID, Cele De La Fuenteel DO, 75 mg at 10/30/23 1030    senna (SENOKOT) tablet 17.2 mg, 2 tablet, Oral, Once, Lisa Bruno MD    sodium bicarbonate tablet 650 mg, 650 mg, Oral, BID after meals, Joe Farmer MD, 650 mg at 10/30/23 1024    sucralfate (CARAFATE) tablet 1 g, 1 g, Oral, 4x Daily (AC & HS), Cele De La Fuenteel, DO, 1 g at 10/29/23 2009    tacrolimus (PROGRAF) capsule 1 mg, 1 mg, Oral, Q12H [address], Cele De La Fuenteel, DO, 1 mg at 10/30/23 1024    tamsulosin (FLOMAX) capsule 0.4 mg, 0.4 mg, Oral, Daily With Dinner, [employer], DO, 0.4 mg at 10/29/23 1711    temazepam (RESTORIL) capsule 15 mg, 15 mg, Oral, HS PRN, Cele De La Fuenteel, DO, 15 mg at 10/29/23 2013    vancomycin (VANCOCIN) 1500 mg in sodium chloride 0.9% 250 mL IVPB, 25 mg/kg, Intravenous, Once, Judy Elkins MD    vancomycin (VANCOCIN) IVPB (premix in dextrose) 1,000 mg 200 mL, 15 mg/kg, Intravenous, Daily PRN, Judy Elkins MD    zolpidem Lorilealan Garcia) tablet 5 mg, 5 mg, Oral, HS PRN, Cele Verdugo DO, 5 mg at 10/29/23 2009    Laboratory Results:  Results from last 7 days   Lab Units 10/30/23  0900 10/30/23  0407 10/29/23  0553 10/28/23  0654 10/27/23  0526 10/26/23  0636 10/26/23  0154 10/25/23  0554 10/24/23  0534   WBC Thousand/uL  --  2.62* 8.79  --  6.99 5.54 6.08 5.96 6.24   HEMOGLOBIN g/dL  --  9.4* 7.2*  --  8.9* 8.1* 8.5* 6.9* 7.5*   HEMATOCRIT %  --  29.6* 22.9*  --  26.2* 24.0* 26.0* 21.0* 23.9*   PLATELETS Thousands/uL 77* 113* 101*  --  94* 75* 82* 74* 72*   SODIUM mmol/L  -- 132* 134* 134* 135 134*  --  134* 134*   POTASSIUM mmol/L  --  4.0 4.0 3.7 3.7 3.9  --  4.1 4.4   CHLORIDE mmol/L  --  97 101 103 103 104  --  104 104   CO2 mmol/L  --  20* 21 22 21 20*  --  20* 20*   BUN mg/dL  --  72* 57* 50* 47* 50*  --  55* 52*   CREATININE mg/dL  --  6.40* 5.44* 4.67* 4.66* 4.85*  --  5.01* 4.20*   CALCIUM mg/dL  --  7.9* 7.4* 7.4* 7.7* 7.0*  --  7.2* 7.2*   MAGNESIUM mg/dL  --   --   --   --  1.4*  --   --   --   --    PHOSPHORUS mg/dL  --   --   --   --  3.0  --   --   --   --        XR chest pa & lateral   Final Result by David Stover MD (10/27 1140)   Possible bibasilar infiltrates or crowding of vessels. Bilateral metastasis unchanged. Workstation performed: [id number]         XR chest portable   Final Result by Azalia Mike MD (10/26 5679)      Mild opacity at the left base with loss of the left diaphragm. This could be due to atelectasis but pneumonia not excluded in the appropriate clinical setting. Bilateral lung metastases. Workstation performed: [id number]         CT abdomen pelvis with contrast   Final Result by Christy Cyr MD (10/23 9637)      1. Redemonstration of right renal atrophy and moderate right hydronephrosis with a double-J right ureteral stent. There is mild diffuse right urothelial thickening and enhancement. There is also mild diffuse thickening of the urinary bladder and adjacent    fat stranding. This may be explained by an infectious etiology including right ureteritis and cystitis. 2. Skin thickening and/or subcutaneous edema in the periumbilical area may be due to history of trauma. Workstation performed: [id number]         CT head without contrast   Final Result by Padmini Paige DO (10/23 1300)   Addendum (preliminary) 1 of 1 by Padmini Paige DO (10/23 1300)   ADDENDUM:   Prior from July 31, 2023 performed under separate medical record number.    Prior left-sided aneurysm clipping and local encephalomalacia is    unchanged. I personally discussed the CT brain and C-spine with Ashleighkp    on 10/23/2023 1:00 PM.            Final   No acute intracranial abnormality. Workstation performed: [id number]         CT spine cervical without contrast   Final Result by Magi Carmichael DO (10/23 2223)   No cervical spine fracture or traumatic malalignment. Workstation performed: [id number]         XR trauma multiple   Final Result by Aurelio Robles MD (10/23 7683)      Spiculated mass in the right upper lobe, little changed since 8/1/2023. Interval increase in size of bilateral pulmonary metastases. No evidence of acute traumatic abnormality in the chest.      The study was marked in [facility] for immediate notification. Workstation performed: [id number]         XR chest portable   Final Result by Aurelio Robles MD (10/24 0896)      Spiculated mass in the right upper lobe, little changed since 8/1/2023. Interval increase in size of bilateral pulmonary metastases. No evidence of acute traumatic abnormality in the chest.      The study was marked in [facility] for immediate notification. Workstation performed: [id number]         IR port removal    (Results Pending)   US kidney and bladder    (Results Pending)       Portions of the record may have been created with voice recognition software. Occasional wrong word or "sound a like" substitutions may have occurred due to the inherent limitations of voice recognition software. Read the chart carefully and recognize, using context, where substitutions have occurred.

## 2023-10-30 NOTE — CONSULTS
Consult - Urology   Kim Gardiner 1950, 74 y.o. adult MRN: [id number]    Unit/Bed#: [facility] 09 Encounter: [id number]    UTI (urinary tract infection)  Assessment & Plan  Urine microscopic with innumerable WBCs, occasional bacteria  MSSA bacteremia  Previous urine culture positive for alphahemolytic strep, repeat culture pending  On levofed due to hypotension and fever  Procalcitonin 49.65, 56.62 (10/29)  Lactic acid 2.7  CT: Mild fairly diffuse urothelial thickening and enhancement on the right, mild diffuse thickening of the urinary bladder and adjacent fat stranding last week  US ordered  Empiric antibiotics tailored to culture  Medical optimization per primary team  IR consult for PCN placement with eventual removal of stent; patient and family are agreeable    Acute on chronic renal failure   Assessment & Plan  Creat 6.52 has been rising this admit, baseline 2.5-3.6  CT: Right renal atrophy and moderate right hydronephrosis with right double-J stent in place  Recommend hydration per primary team  Mcallister catheter placement for maximum drainage; pt refused last week but is agreeable  Medical optimization per primary team  IR consult for right PCN      Bladder cancer 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 [facility])  Assessment & Plan  TURBT May 2022 for muscle invasive bladder cancer with inability to visualize distal right ureter which required nephrostomy tube eventually transition to PCNU and now with internalized stent  Did not tolerate chemotherapy due to toxicity  Completed radiation            Subjective:   Patient was seen in consultation by myself on 10/24. He is known to Dr. Eduardo Fernandez for history of advanced bladder cancer and right ureteral obstruction. Patient previously underwent nephrostomy tube removal and internal stent placement. His last stent exchange was 4/12/2023 at which time metal stent was placed which is good for 1 year.   He was to follow-up with urology in 6 months but presents to the hospital last week due to multiple falls, dizziness, poor appetite and poor po intake. He was found to have a UTI. Urology was asked to reevaluate patient due to concern for possible pyelonephritis. Deceiving pressures due to hypotension and fevers. His urine was positive for Staphylococcus aureus and he was treated. He had repeat urine testing today which is pending. There are large leukocytes negative nitrites, innumerable WBCs with occasional bacteria. His blood cultures were also positive for MSSA. His lactic acid is elevated 0.7 with a procalcitonin of 49.65, was 56.62 yesterday. He has an ultrasound of the kidney and bladder ordered but no other repeat imaging has been done. He is currently on pressors and medical ICU. Discussed with Dr. Jeff Cody and Dr. Jeff Nair who recommend consideration for PCN placement with eventual stent removal due to recurrent episodes of hydronephrosis and infection. Discussed with patient and family at bedside they are agreeable to proceed with replacement of nephrostomy tube with eventual removal of his stent. TT sent to critical care attending as well as to IR physician Dr. Chantelle York. His baseline creatinine is 2.5-3.6. Previous CT scan revealed right renal atrophy with mild hydronephrosis. Current CT scan again redemonstrates right renal atrophy with moderate right hydronephrosis with right double-J stent in place. Has urethral stricture and ureteral stricture. He reports having urinary burning for one month but did not tell his family until he was weak and falling. He previously declined Mcallister catheter placement on admission however his creatinine at that time was at baseline. He now has worsening creatinine up to 6.5 and we recommended Mcallister catheter placement which he agreed to and has in place. He has a history of metastatic bladder cancer to the lungs, history of liver transplant on tacrolimus, dementia, vertigo, CKD (refused dialysis previously), hepatitis C, CVA. Review of Systems   Constitutional:  Negative for activity change, appetite change, chills, fatigue, fever and unexpected weight change. HENT:  Negative for facial swelling. Eyes:  Negative for discharge. Respiratory: Negative. Negative for cough and shortness of breath. Cardiovascular:  Negative for chest pain. Gastrointestinal: Negative. Negative for abdominal distention, abdominal pain and nausea. Endocrine: Negative. Genitourinary: Negative. Negative for dysuria, frequency, hematuria and urgency. Musculoskeletal:  Negative for back pain and myalgias. Skin:  Negative for pallor and rash. Allergic/Immunologic: Negative. Negative for immunocompromised state. Neurological:  Negative for facial asymmetry and speech difficulty. Psychiatric/Behavioral:  Negative for agitation and confusion. Objective:  Vitals: Blood pressure 133/61, pulse 98, temperature 98.6 °F (37 °C), temperature source Oral, resp. rate (!) 27, height 5' 3" (1.6 m), weight 62.3 kg (137 lb 5.6 oz), SpO2 97 %. ,Body mass index is 24.33 kg/m². Physical Exam  Vitals and nursing note reviewed. Constitutional:       General: Rosmery Tuttle is not in acute distress. Appearance: Normal appearance. Rosmery Tuttle is not ill-appearing, toxic-appearing or diaphoretic. HENT:      Head: Normocephalic. Cardiovascular:      Rate and Rhythm: Tachycardia present. Pulmonary:      Effort: Pulmonary effort is normal. No respiratory distress. Abdominal:      General: Abdomen is flat. There is no distension. Genitourinary:     Comments: Mcallister catheter draining clear yellow urine  Musculoskeletal:         General: No swelling. Cervical back: Normal range of motion. Skin:     General: Skin is warm and dry. Neurological:      General: No focal deficit present. Mental Status: Rosmery Tuttle is alert and oriented to person, place, and time.    Psychiatric:         Mood and Affect: Mood normal.         Behavior: Behavior normal.         Thought Content: Thought content normal.         Judgment: Judgment normal.         Imaging:  CT Abdomen: 10/23/2023; ultrasound kidney and bladder ordered but not yet completed  CT ABDOMEN AND PELVIS WITH IV CONTRAST     INDICATION:   Abdominal pain, acute, nonlocalized  abdominal ttp. COMPARISON: CT abdomen pelvis 7/24/2023     TECHNIQUE:  CT examination of the abdomen and pelvis was performed. Multiplanar 2D reformatted images were created from the source data. This examination, like all CT scans performed in the [facility], was performed utilizing techniques to minimize radiation dose exposure, including the use of iterative reconstruction and automated exposure control. Radiation dose length   product (DLP) for this visit:  314.81 mGy-cm     IV Contrast:  100 mL of iohexol (OMNIPAQUE)  Enteric Contrast:  Enteric contrast was not administered. FINDINGS:     ABDOMEN     LOWER CHEST:  No clinically significant abnormality identified in the visualized lower chest.     LIVER/BILIARY TREE: Transplant liver. No focal liver lesion. GALLBLADDER: Surgically absent. Mild biliary ductal prominence likely due to cholecystectomy status. SPLEEN:  Unremarkable. PANCREAS: The pancreas is atrophic. ADRENAL GLANDS:  Unremarkable. KIDNEYS/URETERS: Appearance of right renal atrophy and moderate right hydronephrosis. Right double-J stent has been placed with proximal loop in the right renal pelvis and distal loop in the urinary bladder. There is mild fairly diffuse urothelial   thickening and enhancement on the right. Small cyst in the upper pole of the left kidney. No left hydronephrosis. STOMACH AND BOWEL:  Unremarkable. APPENDIX:  No findings to suggest appendicitis. ABDOMINOPELVIC CAVITY:  No ascites. No pneumoperitoneum. No lymphadenopathy. VESSELS:  Atherosclerotic changes are present. No evidence of aneurysm.      PELVIS REPRODUCTIVE ORGANS: Coarse calcifications in the prostate gland. URINARY BLADDER: Diffusely thickened appearance of the urinary bladder. The bladder is not well distended, limiting evaluation. There is mild adjacent fat stranding. ABDOMINAL WALL/INGUINAL REGIONS skin thickening or mild subcutaneous edema in the mid abdominal periumbilical area. OSSEOUS STRUCTURES:  No acute fracture or destructive osseous lesion. Spinal degenerative changes are noted. IMPRESSION:     1. Redemonstration of right renal atrophy and moderate right hydronephrosis with a double-J right ureteral stent. There is mild diffuse right urothelial thickening and enhancement. There is also mild diffuse thickening of the urinary bladder and adjacent   fat stranding. This may be explained by an infectious etiology including right ureteritis and cystitis. 2. Skin thickening and/or subcutaneous edema in the periumbilical area may be due to history of trauma. Workstation performed: [id number]  Imaging reviewed - both report and images personally reviewed.      Labs:  Recent Labs     10/29/23  0553 10/30/23  0407 10/30/23  1414   WBC 8.79 2.62* 20.64*     Recent Labs     10/29/23  0553 10/30/23  0407 10/30/23  1414   HGB 7.2* 9.4* 7.4*       Recent Labs     10/28/23  0654 10/29/23  0553 10/30/23  0407 10/30/23  1102   CREATININE 4.67* 5.44* 6.40* 6.52*       Microbiology:  Pending    History:  Social History     Socioeconomic History    Marital status: Single     Spouse name: None    Number of children: None    Years of education: None    Highest education level: None   Occupational History    None   Tobacco Use    Smoking status: None    Smokeless tobacco: None   Substance and Sexual Activity    Alcohol use: None    Drug use: None    Sexual activity: None   Other Topics Concern    None   Social History Narrative    None     Social Determinants of Health     Financial Resource Strain: Not on file   Food Insecurity: No Food Insecurity (10/26/2023)    Hunger Vital Sign     Worried About Running Out of Food in the Last Year: Never true     Ran Out of Food in the Last Year: Never true   Transportation Needs: No Transportation Needs (10/26/2023)    PRAPARE - Transportation     Lack of Transportation (Medical): No     Lack of Transportation (Non-Medical): No   Physical Activity: Not on file   Stress: Not on file   Social Connections: Not on file   Intimate Partner Violence: Not on file   Housing Stability: Low Risk  (10/26/2023)    Housing Stability Vital Sign     Unable to Pay for Housing in the Last Year: No     Number of Places Lived in the Last Year: 1     Unstable Housing in the Last Year: No       Past Medical History:   Diagnosis Date    Cirrhosis ([address])     Dementia ([address])      History reviewed. No pertinent surgical history. No family history on file.     The following portions of the patient's history were reviewed and updated as appropriate: allergies, current medications, past family history, past medical history, past social history, past surgical history and problem list    LUCY Liang  Date: 10/30/2023 Time: 3:18 PM No

## 2023-10-30 NOTE — TELEPHONE ENCOUNTER
Pt's significant other [facility] called stating that the pt is in the ICU and she would like to speak to Dr Didi COX. [facility] was very distressed. Please call her at [phone]. Pt's chart is marked for merge, ICU encounter is in other chart.  [facility] text sent to provider.)

## 2023-10-30 NOTE — PROGRESS NOTES
Progress Note - Infectious Disease   Kim Gardiner 74 y.o. adult MRN: [id number]  Unit/Bed#: MICU 09 Encounter: [id number]      Impression/Plan:    1. Ascending urinary tract infection. In the setting of #5 below and internal right ureteral stent. Patient with reported dysuria prior to admission, UA with innumerable white blood cells. Urine culture is growing MSSA and there is low colony count of alpha hemolytic strep. CT A/P shows moderate right hydronephrosis, right urothelial thickening and enhancement and bladder wall thickening concerning for a sending ureteritis and cystitis. Patient initially refused felix catheter but now agreeable after decompensation and presentation to ICU. Repeat blood cultures show no growth. Concerned that worsening today is due to lack of source control in setting of persistent hydronephrosis and retained Port-A-Cath rather than more resistant infection              -continue IV Cefepime 1g every 24 hours   -hold on further doses cefepime   -anticipate transition back to Cefazolin in next 24-48 hours              -Follow-up repeat blood cultures              -Urology follow-up   -follow up renal U/S, consider repeat CT A/P pending clinical course     2. MSSA bacteremia. Due to #1 above. While Staphylococcus aureus isolated in the urine is likely due to seeding from a bloodstream infection, in this case a UTI seems to be the primary cause of his bacteremia. Risk factors for isolation of staph in the urine include prior urinary tract manipulation and his chronic stent. He has no open skin wounds or signs of SSTI. The patient does have a Port-A-Cath in place which has not been used for medication infusion outpatient. TTE no vegetations              -antibiotics as above   -follow up repeat blood cultures               -The patient is not on chemotherapy but has a Port-A-Cath in place.   Since Port not being used for infusions outpatient, in the setting of Staphylococcus aureus bacteremia recommend removal     3. Sepsis, evolving since admission with fever and tachycardia, now hypotension 10/30 requiring pressors. Due to #1 and 2 above. Also consider contribution of malignancy due to worsening pulmonary metastatic disease. Suspect decompensation is more due to a lack of source control in the setting of retained Port-A-Cath and persistent right hydronephrosis (patient declined Mcallister catheter earlier in the admission). Would also question if there is a complication of his UTI such as a renal abscess              -Antibiotics as above              -Follow-up blood cultures              -Monitor fever curve, white blood cell count   -Imaging as above     4. 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 Likely secondary to ATN and hydronephrosis. Prior baseline creatinine 3.6 but elevated to 4.25 on admission. Creatinine now worsening due to recurrent fever and hypotension              -Nephrology follow-up ongoing              -Dose adjust antibiotics for creatinine clearance     5. Advanced bladder cancer status post TURBT May 2582 complicated by right ureteral obstruction status post right internal ureteral stent. The patient has metastatic disease to the lungs. He previously underwent radiation and chemotherapy but is no longer on cancer directed therapy.              -Urology follow-up              -Palliative care follow-up outpatient     6. History of liver transplant in 2002. Due to alcohol abuse and hepatitis C. Patient currently on tacrolimus.              -Follow-up tacrolimus level     7. Type 2 diabetes mellitus with hyperglycemia. Recent hemoglobin A1c 7.3%. This is a risk factor for infection. Glycemic control per primary. 8.  Thrombocytopenia, chronic. Monitor platelets     I have discussed the above management plan in detail with the primary service, patient and daughter at bedside. ID will follow. Antibiotics:  Abx day 8  Vancomycin/Cefepime    Subjective:   This morning, the patient developed hypotension and was transferred to the ICU on vasopressor support. He is currently requiring 5 of Levophed. Patient was also febrile to 102.6 overnight. Antibiotics broadened to cefepime and vancomycin. The patient states that he is feeling fine, denies abdominal pain, shortness of breath. He has a persistent cough. Objective:  Vitals:  Temp:  [98.6 °F (37 °C)-102.6 °F (39.2 °C)] 98.6 °F (37 °C)  HR:  [] 94  Resp:  [18-29] 28  BP: ()/(48-86) 129/60  SpO2:  [95 %-100 %] 100 %  Temp (24hrs), Av.1 °F (37.8 °C), Min:98.6 °F (37 °C), Max:102.6 °F (39.2 °C)  Current: Temperature: 98.6 °F (37 °C)    Physical Exam:   General Appearance:  Frail appearing but nontoxic, no acute distress. Throat: Oropharynx moist without lesions. Lungs:   Clear to auscultation bilaterally; no wheezes, rhonchi or rales; respirations unlabored   Heart:  RRR; no murmur, rub or gallop   Abdomen:   Soft, non-tender, non-distended, positive bowel sounds. Extremities: No clubbing, cyanosis or edema   Skin: Right chest wall Port-A-Cath in place, currently accessed       Labs:    All pertinent labs and imaging studies were personally reviewed  Results from last 7 days   Lab Units 10/30/23  1414 10/30/23  0900 10/30/23  0407 10/29/23  0553   WBC Thousand/uL 20.64*  --  2.62* 8.79   HEMOGLOBIN g/dL 7.4*  --  9.4* 7.2*   PLATELETS Thousands/uL 87* 77* 113* 101*       Results from last 7 days   Lab Units 10/30/23  1102 10/30/23  0407 10/29/23  0553   SODIUM mmol/L 136 132* 134*   POTASSIUM mmol/L 3.1* 4.0 4.0   CHLORIDE mmol/L 100 97 101   CO2 mmol/L 22 20* 21   BUN mg/dL 73* 72* 57*   CREATININE mg/dL 6.52* 6.40* 5.44*   CALCIUM mg/dL 7.2* 7.9* 7.4*       Results from last 7 days   Lab Units 10/30/23  0410 10/29/23  0553 10/26/23  0154   PROCALCITONIN ng/ml 49.65* 56.62* 4.11*           Results from last 7 days   Lab Units 10/25/23  0554   FERRITIN ng/mL 324*             Micro:  Results from last 7 days   Lab Units 10/28/23  0603 10/27/23  1620 10/26/23  0155 10/23/23  2216 10/23/23  1534   BLOOD CULTURE  No Growth at 48 hrs. No Growth at 48 hrs. No Growth at 48 hrs. No Growth After 4 Days. Staphylococcus aureus*  --    GRAM STAIN RESULT   --   --   --  Gram positive cocci in clusters*  --    URINE CULTURE   --   --   --   --  60,000-69,000 cfu/ml Staphylococcus aureus*  30,000-39,000 cfu/ml Alpha Hemolytic Streptococcus NOT Enterococcus*         Imaging:  I have personally reviewed pertinent imaging study reports and images in PACS. CT A/P-right renal atrophy and moderate right hydronephrosis with ureteral stent in place.   Mild diffuse right urothelial thickening and enhancement, thickening of the urinary bladder

## 2023-10-30 NOTE — PROCEDURES
Central Line Insertion    Date/Time: 10/30/2023 5:08 PM    Performed by: Miguel A Prince MD  Authorized by: Miguel A Prince MD    Patient location:  Bedside  Other Assisting Provider: No    Consent:     Consent obtained:  Written    Consent given by:  Patient and spouse    Risks discussed:  Arterial puncture, incorrect placement, nerve damage, pneumothorax, infection and bleeding    Alternatives discussed:  No treatment and delayed treatment  Universal protocol:     Procedure explained and questions answered to patient or proxy's satisfaction: yes      Relevant documents present and verified: yes      Test results available and properly labeled: yes      Site/side marked: yes      Immediately prior to procedure, a time out was called: yes      Patient identity confirmed:  Verbally with patient  Pre-procedure details:     Hand hygiene: Hand hygiene performed prior to insertion      Sterile barrier technique: All elements of maximal sterile technique followed      Skin preparation:  2% chlorhexidine    Skin preparation agent: Skin preparation agent completely dried prior to procedure    Indications:     Central line indications: medications requiring central line and hemodynamic monitoring    Anesthesia (see MAR for exact dosages):      Anesthesia method:  None  Procedure details:     Location:  Left internal jugular    Vessel type: vein      Patient position:  Flat    Catheter type:  Triple lumen    Catheter size:  7 Fr    Landmarks identified: yes      Ultrasound guidance: yes      Ultrasound image availability:  Not saved    Sterile ultrasound techniques: Sterile gel and sterile probe covers were used      Manometry confirmation: yes      Number of attempts:  1    Successful placement: yes      Vessel of catheter tip end:  18cm  Post-procedure details:     Post-procedure:  Dressing applied and line sutured    Assessment:  Blood return through all ports, no pneumothorax on x-ray, free fluid flow and placement verified by x-ray    Post-procedure complications: none      Patient tolerance of procedure:   Tolerated well, no immediate complications

## 2023-10-30 NOTE — PHYSICAL THERAPY NOTE
PT cancel note       10/30/23 1400   PT Last Visit   PT Visit Date 10/30/23   Note Type   Note type Cancelled Session   Cancel Reasons Medical status     Lilliana Fortune

## 2023-10-30 NOTE — CONSULTS
e-Consult (IPC)  - Interventional Radiology  Ana Barnes 74 y.o. adult MRN: [id number]  Unit/Bed#: MICU 09 Encounter: [id number]          Interventional Radiology has been consulted to evaluate Ana Barnes    We were consulted by Urology concerning this patient with hydronephrosis, UTI bacteremia and septic shock requiring ICU level of care and vasopressors. Patient was a rapid response earlier in the day today for hypotension. Patient has previously had a right-sided PCN and underwent subsequent stent placement with urology. Interventional radiology was consulted to evaluate the patient for possible PCN placement given his right-sided hydronephrosis and UTI      Inpatient Consult to IR  Consult performed by: Pepper Jaramillo PA-C  Consult ordered by: Francisca Swain[address]        10/30/23    Assessment/Recommendation:     UTI, Hydronephrosis, Sepsis, Bacteremia  -Plan for urgent PCN placement for management of hydronephrosis/UTI  - Given bacteremia with MSSA, also plan to remove existing port  - Right Hydronephrosis as seen on CT from 10/23, see image below  - Reviewed with Dr. Teresa Mcdaniels of IR         21-30 minutes, >50% of the total time devoted to medical consultative verbal/EMR discussion between providers. Written report will be generated in the EMR. Thank you for allowing Interventional Radiology to participate in the care of Ana Barnes. Please don't hesitate to call or TigerText us with any questions.      Pepper Jaramillo PA-C

## 2023-10-30 NOTE — PROGRESS NOTES
Daily Progress Note - Critical Care   Philip Palomo 74 y.o. adult MRN: [id number]  [facility] [id number]        ----------------------------------------------------------------------------------------  HPI/24hr events: 75 y/o M with a PMHx of alcoholic cirrhosis s/p liver transplant in 2003 on tacro, metastatic bladder cancer s/p TURBT with metastasis to the lungs c/w right ureteral obstruction s/p stent, CVA CKD IV, T2DM who was admitted to the hospital on 10/23/23 for multiple falls. Found to have MSSA bactermia like d/t UTI in the presence of urinary stent. Patient was being treated with Ancef. On 10/30/23, pat was a RRT for fever and hypotension. She was transferred to the MICU for pressor support. Course has been c/w TAVON on CKD IV.    ---------------------------------------------------------------------------------------  SUBJECTIVE  Patient feels well. Denies fevers, chills, abdominal pain, or dysuria. C/o non productive cough. Review of Systems  Review of systems was reviewed and negative unless stated above in HPI/24-hour events   ---------------------------------------------------------------------------------------  Assessment and Plan:    Neuro:   Diagnosis: no active issues    CV:   Diagnosis: septic/distributive shock. Found to have  MSSA bacteremia on presentation with urine cultures + alpha hemolytic strep. Suspected seeding from blood in the setting of port-a-cath. Previously being treated with ancef. Now with septic shock. Suspect pyelonephritis in the setting of bladder cancer with chronic right pyelonephritis s/p stent. Plan: broaden abx to vancomycin and cefepime.    Levophed gtt  Wean pressors goal MAP >65  Diagnosis: hx of CVA  C/w lipitor and plavix    Pulm:  Diagnosis: no active issues    GI:   Diagnosis: GERD  Plan: pantoprazole 40 mg qd, carafate  Diagnosis: hx of alcoholic cirrhosis s/p liver transplant in 2003 on tacrolimus  Plan: check tacro level     : Diagnosis: TAVON on CKD IV due to ATN from hypotension and post renal obstruction in the setting of chronic right hydronephrosis from bladder cancer s/p stent with atrophic right kidney. Diagnosis: metabolic acidosis in the setting of CKD  Baseline Cr 3.6; peak Cr of 6.4  Plan: Continue sodium bicarb tabs qd, now urgent HD needs; consent signed by nephrology   Place felix  Repeat [address] urology for concern of pyelonephritis and need for PCN. 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 After long discussion the patient and his wife decided not to proceed with any additional gemcitabine finished radiation therapy on 8/25/2022      Endo:   Diagnosis: hx of T2DM  Plan: lantus 10; SSI      ID:   Septic/distributive shock. Found to have  MSSA bacteremia on presentation with urine cultures + alpha hemolytic strep. Suspected seeding from blood in the setting of port-a-cath. Previously being treated with ancef. Now with septic shock. Suspect ? pyelonephritis in the setting of bladder cancer with chronic right pyelonephritis s/p stent. Plan: broaden abx to vancomycin and cefepime.    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   Plan: continue lyrica, nortriptyline      Disposition: Continue Critical Care   Code Status: Level 3 - DNAR and DNI  ---------------------------------------------------------------------------------------  ICU CORE MEASURES    Prophylaxis   VTE Pharmacologic Prophylaxis: Heparin  VTE Mechanical Prophylaxis: sequential compression device  Stress Ulcer Prophylaxis: Pantoprazole PO      Invasive Devices Review  Invasive Devices       Central Venous Catheter Line  Duration             Port A Cath Right -- days              Peripheral Intravenous Line  Duration             Peripheral IV 10/30/23 Left;Upper;Ventral (anterior) Arm <1 day                  Can any invasive devices be discontinued today? Not applicable  ---------------------------------------------------------------------------------------  OBJECTIVE    Vitals   Vitals:    10/30/23 0835 10/30/23 0845 10/30/23 0915 10/30/23 0930   BP: 117/86 130/69 108/61    BP Location: Right arm      Pulse: 102 100 96 100   Resp: (!) 28 (!) 26 (!) 23 (!) 26   Temp:   99.5 °F (37.5 °C)    TempSrc:   Oral    SpO2: 100% 100% 100% 100%   Weight:       Height:         Temp (24hrs), Av °F (37.8 °C), Min:97.6 °F (36.4 °C), Max:102.6 °F (39.2 °C)  Current: Temperature: 99.5 °F (37.5 °C)    Respiratory:  SpO2: SpO2: 100 %, SpO2 Activity: SpO2 Activity: At Rest  Nasal Cannula O2 Flow Rate (L/min): 2 L/min    Invasive/non-invasive ventilation settings   Respiratory      Lab Data (Last 4 hours)      None           O2/Vent Data (Last 4 hours)      None                    Physical Exam  Constitutional:       General: Nick Spurronnie is not in acute distress. Appearance: Nick Spurling is not ill-appearing or toxic-appearing. HENT:      Head: Normocephalic and atraumatic. Mouth/Throat:      Mouth: Mucous membranes are moist.   Eyes:      General: No scleral icterus. Extraocular Movements: Extraocular movements intact. Conjunctiva/sclera: Conjunctivae normal.   Cardiovascular:      Rate and Rhythm: Normal rate and regular rhythm. Pulses: Normal pulses. Heart sounds: No murmur heard. Comments: Port-acath in place without erythema or TTP  Pulmonary:      Effort: Pulmonary effort is normal. No respiratory distress. Breath sounds: No wheezing or rales. Abdominal:      General: There is no distension. Palpations: Abdomen is soft. Tenderness: There is no abdominal tenderness. There is no guarding. Musculoskeletal:      Right lower leg: No edema. Left lower leg: No edema. Skin:     General: Skin is warm and dry. Capillary Refill: Capillary refill takes less than 2 seconds. Neurological:      General: No focal deficit present. Mental Status: Nani Barton is alert and oriented to person, place, and time. Mental status is at baseline. Psychiatric:         Mood and Affect: Mood normal.         Behavior: Behavior normal.         Thought Content:  Thought content normal.         Judgment: Judgment normal.         Laboratory and Diagnostics:  Results from last 7 days   Lab Units 10/30/23  0900 10/30/23  0407 10/29/23  0553 10/27/23  0526 10/26/23  0636 10/26/23  0154 10/25/23  0554 10/24/23  0534 10/23/23  1133   WBC Thousand/uL  --  2.62* 8.79 6.99 5.54 6.08 5.96 6.24 8.06   HEMOGLOBIN g/dL  --  9.4* 7.2* 8.9* 8.1* 8.5* 6.9* 7.5* 8.0*   HEMATOCRIT %  --  29.6* 22.9* 26.2* 24.0* 26.0* 21.0* 23.9* 25.7*   PLATELETS Thousands/uL 77* 113* 101* 94* 75* 82* 74* 72* 68*   NEUTROS PCT %  --   --   --   --  77* 80*  --  81* 79*   MONOS PCT %  --   --   --   --  9 8  --  8 10   EOS PCT %  --   --   --   --  2 1  --  1 1     Results from last 7 days   Lab Units 10/30/23  0407 10/29/23  0553 10/28/23  0654 10/27/23  0526 10/26/23  0636 10/25/23  0554 10/24/23  0534 10/23/23  1133   SODIUM mmol/L 132* 134* 134* 135 134* 134* 134* 135   POTASSIUM mmol/L 4.0 4.0 3.7 3.7 3.9 4.1 4.4 4.5   CHLORIDE mmol/L 97 101 103 103 104 104 104 108   CO2 mmol/L 20* 21 22 21 20* 20* 20* 16*   ANION GAP mmol/L 15 12 9 11 10 10 10 11   BUN mg/dL 72* 57* 50* 47* 50* 55* 52* 56*   CREATININE mg/dL 6.40* 5.44* 4.67* 4.66* 4.85* 5.01* 4.20* 4.25*   CALCIUM mg/dL 7.9* 7.4* 7.4* 7.7* 7.0* 7.2* 7.2* 7.7*   GLUCOSE RANDOM mg/dL 163* 126 110 116 245* 185* 157* 188*   ALT U/L  --   --   --   --   --   --   --  16   AST U/L  --   --   -- --   --   --   --  37   ALK PHOS U/L  --   --   --   --   --   --   --  59   ALBUMIN g/dL  --   --   --   --   --   --   --  3.3*   TOTAL BILIRUBIN mg/dL  --   --   --   --   --   --   --  0.44     Results from last 7 days   Lab Units 10/27/23  0526   MAGNESIUM mg/dL 1.4*   PHOSPHORUS mg/dL 3.0      Results from last 7 days   Lab Units 10/30/23  0900   INR  1.27*          Results from last 7 days   Lab Units 10/30/23  0410   LACTIC ACID mmol/L 1.6     ABG:    VBG:    Results from last 7 days   Lab Units 10/30/23  0410 10/29/23  0553 10/26/23  0154   PROCALCITONIN ng/ml 49.65* 56.62* 4.11*       Micro  Results from last 7 days   Lab Units 10/28/23  0603 10/27/23  1620 10/26/23  0155 10/23/23  2216 10/23/23  1534   BLOOD CULTURE  No Growth at 24 hrs. No Growth at 24 hrs. No Growth at 48 hrs. No Growth After 4 Days. Staphylococcus aureus*  --    GRAM STAIN RESULT   --   --   --  Gram positive cocci in clusters*  --    URINE CULTURE   --   --   --   --  60,000-69,000 cfu/ml Staphylococcus aureus*  30,000-39,000 cfu/ml Alpha Hemolytic Streptococcus NOT Enterococcus*       Imaging:  I have personally reviewed pertinent reports. and I have personally reviewed pertinent films in PACS    Intake and Output  I/O         10/28 0701  10/29 0700 10/29 0701  10/30 0700 10/30 0701  10/31 0700    P. O.  240     I.V. (mL/kg)  800 (12.8)     Total Intake(mL/kg)  1040 (16.7)     Urine (mL/kg/hr)  200 (0.1)     Stool       Total Output  200     Net  +840                  UOP: 105 ml/hr     Height and Weights   Height: 5' 3" (160 cm)     Body mass index is 24.33 kg/m².   Weight (last 2 days)       Date/Time Weight    10/30/23 0600 62.3 (137.35)    10/29/23 0558 61.2 (134.92)    10/28/23 0600 56.9 (125.44)              Nutrition       Diet Orders   (From admission, onward)                 Start     Ordered    10/30/23 0854  Diet NPO  Diet effective now        References:    Adult Nutrition Support Algorithm    RD Therapeutic Diet Order Protocol   Question Answer Comment   Diet Type NPO    RD to adjust diet per protocol?  Yes        10/30/23 0853                    Active Medications  Scheduled Meds:  Current Facility-Administered Medications   Medication Dose Route Frequency Provider Last Rate    acetaminophen  975 mg Oral Q6H PRN Marybeth Molina PA-C      atorvastatin  80 mg Oral Daily With [employer], DO      cefepime  1,000 mg Intravenous Q24H Jesus Sanders, DO      chlorhexidine  15 mL Mouth/Throat Q12H [address] [place] Haily Emanuel, DO      clopidogrel  75 mg Oral Daily Cele Verdugo, DO      dextromethorphan-guaiFENesin  10 mL Oral Q4H PRN Marybeth Molina PA-C      dextrose           docusate sodium  100 mg Oral BID Felicitas Mims MD      heparin (porcine)  5,000 Units Subcutaneous Q8H [address] [facility], [facility]      insulin glargine  10 Units Subcutaneous HS Felicitas Mims MD      insulin lispro  1-5 Units Subcutaneous TID AC Cele Verdugo, DO      insulin lispro  3 Units Subcutaneous TID With Meals Felicitas Mims MD      meclizine  25 mg Oral Q8H PRN Eyvonne Rung, DO      norepinephrine  1-30 mcg/min Intravenous Titrated Misty Barbgwendolyn Castellanos, DO 5 mcg/min (10/30/23 0935)    nortriptyline  10 mg Oral HS Cele Verdugo, DO      ondansetron  4 mg Intravenous Q4H PRN Marybeth Molina PA-C      oxybutynin  5 mg Oral BID Cele Verdugo, DO      oxyCODONE  2.5 mg Oral Q4H PRN Felicitas Mims MD      pantoprazole  40 mg Oral Daily Cele Verdugo, DO      pregabalin  75 mg Oral BID Cele Verdugo, DO      senna  2 tablet Oral Once Felicitas Mims MD      sodium bicarbonate  650 mg Oral BID after meals Sharon Melgar MD      sucralfate  1 g Oral 4x Daily (AC & HS) Cele Verdugo, DO      tacrolimus  1 mg Oral Q12H [address] [facility], DO      tamsulosin  0.4 mg Oral Daily With [employer], DO      temazepam  15 mg Oral HS PRN Eyvonne Rung, DO vancomycin  25 mg/kg Intravenous Once Gerhard Dodge MD      vancomycin  15 mg/kg Intravenous Daily PRN Gerhard Dodge MD      zolpidem  5 mg Oral HS PRN Cele Verdugo DO       Continuous Infusions:  norepinephrine, 1-30 mcg/min, Last Rate: 5 mcg/min (10/30/23 9641)      PRN Meds:   acetaminophen, 975 mg, Q6H PRN  dextromethorphan-guaiFENesin, 10 mL, Q4H PRN  dextrose, ,   meclizine, 25 mg, Q8H PRN  ondansetron, 4 mg, Q4H PRN  oxyCODONE, 2.5 mg, Q4H PRN  temazepam, 15 mg, HS PRN  vancomycin, 15 mg/kg, Daily PRN  zolpidem, 5 mg, HS PRN        Allergies   No Known Allergies  ---------------------------------------------------------------------------------------  Advance Directive and Living Will:      Power of :    POLST:    ---------------------------------------------------------------------------------------  Care Time Delivered:   Upon my evaluation, this patient had a high probability of imminent or life-threatening deterioration due to septic shock, which required my direct attention, intervention, and personal management. I have personally provided 60 minutes (20 to 30) of critical care time, exclusive of procedures, teaching, family meetings, and any prior time recorded by providers other than myself. Chau Castellanos DO      Portions of the record may have been created with voice recognition software. Occasional wrong word or "sound a like" substitutions may have occurred due to the inherent limitations of voice recognition software.   Read the chart carefully and recognize, using context, where substitutions have occurred

## 2023-10-30 NOTE — SEPSIS NOTE
Sepsis Note   [facility] 74 y.o. adult MRN: [id number]  Unit/Bed#: MICU 09 Encounter: [id number]       Initial Sepsis Screening       [address] Name 10/30/23 1016                Is the patient's history suggestive of a new or worsening infection? Yes (Proceed)  -[address] [facility]        Suspected source of infection urinary tract infection  -KH        Indicate SIRS criteria Leukocytosis (WBC > 46234 IJL) OR Leukopenia (WBC <4000 IJL) OR Bandemia (WBC >10% bands); Hyperthemia > 38.3C (100.9F) OR Hypothermia <36C (96.8F)  -KH        Are two or more of the above signs & symptoms of infection both present and new to the patient? Yes (Proceed)  -KH        Assess for evidence of organ dysfunction: Are any of the below criteria present within 6 hours of suspected infection and SIRS criteria that are NOT considered to be chronic conditions? MAP < 65  -[address] [facility]        Date of presentation of septic shock 10/30/23  -[address] [facility]        Time of presentation of septic shock 0830  -[address] [facility]        Fluid Resuscitation: 30 ml/kg IV fluid bolus will be given based on actual body weight  -KH        Is the patient is persistently hypotensive in the hour after fluid bolus administration? If yes, patient meets criteria for vasopressor use. YES  -[address] [facility]        Sepsis Note: Click "NEXT" below (NOT "close") to generate sepsis note based on above information. YES (proceed by clicking "NEXT")  -[address]  (r) = Recorded By, (t) = Taken By, (c) = Cosigned By      [facility] Name Provider Type    Marley Urias DO Resident                        Body mass index is 24.33 kg/m².   Wt Readings from Last 1 Encounters:   10/30/23 62.3 kg (137 lb 5.6 oz)        Unrecognized patient sex

## 2023-10-30 NOTE — PROGRESS NOTES
Vancomycin IV Pharmacy-to-Dose Consultation    Cecilia Roche is a 74 y.o. adult who is currently receiving Vancomycin IV with management by the Pharmacy Consult service. Relevant clinical data and objective / subjective history reviewed. Vancomycin Assessment:  Indication: Bacteremia (goal -600, trough >10)    Status: critically ill  Micro:   - 10/23 BC 1/2: MSSA  - 10/23 Urine: MSSA  Procalcitonin: 49.65  Renal Function:     Lab Results   Component Value Date    CREATININE 6.52 (H) 10/30/2023     Estimated Creatinine Clearance (by C-G formula based on SCr of 6.52 mg/dL (H))  Female: 6.2 mL/min (A)  Male: 7.9 mL/min (A)  When the gender of the patient is unspecified you will see values for both male and female. Dialysis: no, but may progress to dialysis in next 24h  Days of Therapy: 1  Current Dose: 1500 mg IV once   Goal AUC / Trough: -600, trough >10   Last Level: none  Assessment: Antibiotics escalated out of concern for new septic shock. Vancomycin Plan:  New Dosing: change to 1000 mg IV daily prn for random level less than 15  Predicted Trough / AUC: N/A  Next Level: on 10/31 at 0600  Renal Function Monitoring: Daily BMP and [address] will continue to follow closely for s/sx of nephrotoxicity, infusion reactions and appropriateness of therapy. BMP and CBC will be ordered per protocol. We will continue to follow the patient’s culture results and clinical progress daily.        Mary Garcia, PharmD, BCCCP  Critical Care Clinical Pharmacist  Available via [facility] Text

## 2023-10-30 NOTE — DISCHARGE INSTRUCTIONS
Nephrostomy Tube Care     WHAT YOU NEED TO KNOW:   A nephrostomy tube is a catheter (thin plastic tube) that is inserted through your skin and into your kidney. The nephrostomy tube drains urine from your kidney into a collecting bag outside your body. You may need a nephrostomy tube when something is blocking the normal flow of urine. A nephrostomy tube may be used for a short or a long period of time. The nephrostomy tube comes out of your back, so you will need someone to help care for your nephrostomy tube. DISCHARGE INSTRUCTIONS:      How to clean the skin around the nephrostomy tube and change the bandage:  Since the nephrostomy tube comes out of your back, you will not be able to care for it by yourself. Ask someone to follow the general directions below to check and care for your nephrostomy tube. Gather the items you will need. Disposable (single use) under-pad, and a clean washcloth  Plain soap, warm water, and new medical gloves  Sterile gauze bandages  Clear adhesive dressing or medical tape  Skin barrier  Protective skin film  Trash bag  Remove the old bandage, and check the tube entry site. Have the patient lie on his side with the nephrostomy tube entry site facing up. Place the under-pad where it will catch drainage as you are working with the nephrostomy tube. Wash your hands with soap and water. Put on new medical gloves. Gently remove the old bandage, without pulling on the tube. Do this by holding the skin beside the tube with one hand. With the other hand, gently remove sticky tape and the skin barrier by pulling in the same direction as hair growth. Do not touch the side of the bandage that is placed over or around the tube. Throw the bandage and skin barrier away in a trash bag. Look for signs of infection, such as skin redness and swelling. Report any skin changes to healthcare providers. Clean the tube entry site.     Hold the tube in place to keep it from being pulled out while you are cleaning around it. You will need to clean the area twice. For the first cleaning, wet a new gauze bandage with soap and water. Begin at the entry site of the tube. Wipe the skin in circles, moving away from the entry site. Remove blood and any other material with the gauze. Do this as often as needed. Use a new gauze bandage each time you clean the area, moving away from the entry site. For the second cleaning, wet a new gauze bandage with water. Begin at the entry site of the tube. Wipe the skin in circles, moving away from the entry site. Use a new gauze bandage each time you clean the area, moving away from the entry site. Gently pat the skin with a clean washcloth to dry it. Apply the skin barrier and bandages. Roll up a bandage to make it thick, and place it under  the place where the tube enters the skin. Place it to support the tube, and stop it from kinking or bending. Tape the bandage in place, and apply more bandages if directed by a healthcare provider. Bring the tubing forward to the front and tape it to the skin. Do not stretch the tube tight, because this may pull the nephrostomy tube out. How often to change the bandage. Change the bandage around the tube, every other day. If your bandages  get dirty or wet, change them right away, and as often as needed. If your nephrostomy tube is to be used for a long period of time, the tube needs to be changed every 2 to 3 months. Healthcare providers will tell you when you need to make an appointment to have your tube changed. How to care for the urine drainage bag:   Ask if you need to measure and write down how much urine is in the bag before you empty it. Drain urine out of the drainage bag when it is ½ to ? full. Open the spout at the bottom of the bag to empty the urine into the toilet. You may need to detach the drainage bag from the nephrostomy tube to change it. . If so, attach a new drainage bag tightly to the nephrostomy tube. How to prevent problems with your nephrostomy tube:   Change bandages, directed. This helps to prevent infection. Throw away or clean your drainage bag as directed by your healthcare provider. Wipe the connecting ends of the drainage bag with alcohol before you reconnect the bag to the tube. This helps prevent infection. Keep the tube taped to your skin and connected to a drainage bag placed below the level of your kidneys. This helps prevent urine from backing up into your kidneys. You may wear a small drainage bag strapped to your leg to let you move around more easily. Check the catheter to be sure it is in place after you change your clothes or do other activities. Do not wear tight clothing over the tube. Place the tubing over your thigh rather than under it when you are sitting down. Be sure that nothing is pulling on the nephrostomy tube when you move around. Change positions if you see little or no urine in your drainage bag. Check to see if the urine tube is twisted or bent. Be sure that you are not sitting or lying on the tube. If there are no kinks and there is little or no urine in the drainage bag, tell your healthcare provider. Flush out the tube as directed. Some tubes get flushed one time a day with 10 mls of NSS You will be given a prescription for the flushes. To flush the nephrostomy tube, clean both connections with alcohol swap. Twist off the drainage bag tube and twist the saline syringe into the nephrostomy tube and flush briskly. Remove the syringe and twist the drainage bag tube back into the nephrostomy tube. Keep the site covered while you shower. Tape a piece of clear adhesive plastic over the dressing to keep it dry while you shower. Do not take tub baths.     Contact Interventional Radiology at [phone] [place] PATIENTS: Contact Interventional Radiology at [phone]) Mickey Duque PATIENTS: Contact Interventional Radiology at [phone]) if:  The skin around the nephrostomy tube is red, swollen, itches, or has a rash. You have a fever greater than 101 or chills. You have lower back or hip pain. There are changes in how your urine looks or smells. You have little or no urine draining from the nephrostomy tube. You have nausea and are vomiting. The black oleg on your tube has moved, or the tube is longer than when it was put in. You have questions or concerns about your condition or care. The nephrostomy tube comes out completely. There is blood, pus, or a bad smell coming from the place where the tube enters your skin. Urine is leaking around the tube. The following pharmacies carry the flush syringes. [facility] AND [facility]                    [address] [place] [address] Road  Phone [phone]            Phone [phone]  [address] [facility]. [address] [place] [address] [address]                                 [phone]  Phone [phone]            Phone [phone]    [facility]                                                                         [facility] [phone]  [address] [place] [address]. [address]   Phone [phone]  Implanted Venous Access Port Removal    WHAT YOU NEED TO KNOW:   An implanted venous access port is a device used to give treatments and take blood. It may also be called a central venous access device (CVAD).  The port is a small container that is placed under your skin, usually in your upper chest. A port can also be placed in your arm or abdomen. The port is attached to a catheter that enters a large vein. DISCHARGE INSTRUCTIONS:   Resume your normal diet. Small sips of flat soda will help with mild nausea. Prevent an infection:     Wash your hands often. Use soap and water. Clean your hands before and  after you care for your incision. Check your skin for infection every day. Look for redness, swelling, or fluid oozing from the incision site. Dressing may come off in 24 hours. Medical glue will peel off on its own in 5 to 10 days. You may shower 24 hours after procedure. Follow up with your healthcare provider as directed  Write down your questions so you remember to ask them during your visits. Activity:  You may return to your daily activities when the area heals. Contact Interventional Radiology at [phone] [place] PATIENTS: Contact Interventional Radiology at [phone]) Gladys John PATIENTS: Contact Interventional Radiology at [phone]) if:     You have a fever. You have persistent nausea. Your inciscion site is red, swollen, or draining pus. You have questions or concerns about your condition or care. Seek care immediately or call 911 if:  Blood soaks through your bandage. The skin over or around your incision breaks open. Your heart is jumping or fluttering. You have a headache, blurred vision, and feel confused. You have pain in your arm, neck, shoulder, or chest.    You have trouble breathing that is getting worse over time.

## 2023-10-30 NOTE — PLAN OF CARE
Early this morning the patient was febrile, tachypneic/tachycardic, vomiting, had diarrhea, and HARPER without SOB on room air. Tylenol & Zofran administered, Provider notified/saw patient at bedside, ordered blood work, covid, Flu, MRSA swab. Patient has not been making urine since yesterday, and creatinine is skyrocketing. Patient NPO since midnight. RN will continue to assess/monitor.           Problem: PAIN - ADULT  Goal: Verbalizes/displays adequate comfort level or baseline comfort level  Description: Interventions:  - Encourage patient to monitor pain and request assistance  - Assess pain using appropriate pain scale  - Administer analgesics based on type and severity of pain and evaluate response  - Implement non-pharmacological measures as appropriate and evaluate response  - Consider cultural and social influences on pain and pain management  - Notify physician/advanced practitioner if interventions unsuccessful or patient reports new pain  Outcome: Progressing     Problem: INFECTION - ADULT  Goal: Absence or prevention of progression during hospitalization  Description: INTERVENTIONS:  - Assess and monitor for signs and symptoms of infection  - Monitor lab/diagnostic results  - Monitor all insertion sites, i.e. indwelling lines, tubes, and drains  - Monitor endotracheal if appropriate and nasal secretions for changes in amount and color  - [place] appropriate cooling/warming therapies per order  - Administer medications as ordered  - Instruct and encourage patient and family to use good hand hygiene technique  - Identify and instruct in appropriate isolation precautions for identified infection/condition  Outcome: Progressing

## 2023-10-30 NOTE — PROGRESS NOTES
[facility] [facility]  Progress Note  Name: Jairon Cunningham  MRN: [id number]  Unit/Bed#: MICU 09 I Date of Admission: 10/23/2023   Date of Service: 10/30/2023 I Hospital Day: 7    Assessment/Plan   * Fall  Assessment & Plan  Past medical history of vertigo, dementia and cirrhosis presenting after multiple falls over last 2 days. After discussion with family, patient has been dizzy for several weeks with poor oral intake along with complaints of urinary frequency and dysuria. Meclizine as needed  Obtain orthostatic vitals  Work-up revealed UTI and bacteremia, treatment as below  PT/OT    Septic shock [facility])  Assessment & Plan  Developed fever and hypotension again today, likely due to port in place. Blood cultures have been negative  Rapid response called, vasopressors started, transfer to MICU    MSSA bacteremia  Assessment & Plan  Plan as above  D/w with the patient and his wife who are agreeable to port removal  IR consulted, for port removal today    UTI (urinary tract infection)  Assessment & Plan  History of UTIs  Patient denies any dysuria however family reports that he has been complaining of dysuria for the last few days with mild confusion  UA with signs of infection  CT A/P with right renal atrophy, moderate right hydronephrosis with right ureteral stent. There is mild diffuse right urothelial thickening and enhancement. There is also mild diffuse thickening of the urinary bladder and adjacent fat stranding. This may be explained by infectious etiology including right ureteritis and cystitis.   Prior CT scan reviewed this similar ureteritis findings however cystitis appears to be new  10/26, 10/27, 10/28 cultures are negative for growth to date  Uine culture is positive for alpha step and MSSA, continue cefazolin  Per urology no indication for stent change  Monitor temps, WBCs      Acute on chronic renal failure   Assessment & Plan  History of CKD with baseline creatinine of 3.3-3.6  Likely TAVON on CKD due to poor po intake and possible contrast nephropathy  Presenting with creatinine of 4.2  Not meeting criteria for TAVON though likely elevated secondary to underlying CKD and poor oral intake  Received IV contrast for trauma scans  Avoid nephrotoxic agents and relative hypotension  Nephrology following, no indication to do HD at this time.   Renal function worse today d/w nephrology, may need to start renal replacement therapy today    Anemia  Assessment & Plan  Chronic anemia likely due to CKD and multiple blood draws  Likely worsened due to acute infection  Check anemia studies and transfuse 1 unit of [facility] 10/25  Blood consent obtained    History of CVA (cerebrovascular accident)  Assessment & Plan  Remote history of CVA  Continue Plavix    Thrombocytopenia ([address])  Assessment & Plan  Lab Results   Component Value Date     (L) 10/30/2023     Chronic at baseline    CKD (chronic kidney disease)  Assessment & Plan  Lab Results   Component Value Date    CREATININE 6.40 (H) 10/30/2023    CREATININE 5.44 (H) 10/29/2023    CREATININE 4.67 (H) 10/28/2023   Plan as above    Dementia ([address])  Assessment & Plan  A&O x4  Outpatient follow-up  Delirium precautions    T2DM (type 2 diabetes mellitus) ([address])  Assessment & Plan  No results found for: "HGBA1C"    Recent Labs     10/28/23  2057 10/29/23  1054 10/29/23  1634 10/29/23  2108   POCGLU 99 166* 218* 241*         Blood Sugar Average: Last 72 hrs:  (P) [id number]  Not on any oral agents  SSI while inpatient  Monitor glucose closely  Prandial and basal insulin added    History of liver transplant ([address])  Assessment & Plan  History of liver transplant secondary to alcoholic cirrhosis  Continue tacrolimus 1 mg every 12 hours  Follow-up with GI outpatient    Insomnia  Assessment & Plan  Continue temazepam and Ambien which patient has been on for 30 years  Confirmed with PDMP    Bladder cancer 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 [facility])  Assessment & Plan  History of stage IV bladder tumor status post resection and radiation with metastasis to the lungs  Not a candidate for further therapy cancer directed treatment  No pain currently  Supportive care  Continue nortriptyline and Lyrica, PDMP reviewed  Follows up with palliative care outpatient               VTE Pharmacologic Prophylaxis: VTE Score: 6 Moderate Risk (Score 3-4) - Pharmacological DVT Prophylaxis Ordered: heparin. Patient Centered Rounds: I performed bedside rounds with nursing staff today. Discussions with Specialists or Other Care Team Provider: nurse, CM, critical care, IR    Education and Discussions with Family / Patient: Updated  (wife) via phone. Total Time Spent on Date of Encounter in care of patient: 40 mins. This time was spent on one or more of the following: performing physical exam; counseling and coordination of care; obtaining or reviewing history; documenting in the medical record; reviewing/ordering tests, medications or procedures; communicating with other healthcare professionals and discussing with patient's family/caregivers. Current Length of Stay: 7 day(s)  Current Patient Status: Inpatient   Certification Statement: The patient will continue to require additional inpatient hospital stay due to septic shock  Discharge Plan: Anticipate discharge in >72 hrs to to be determined    Code Status: Level 3 - DNAR and DNI    Subjective:   Paged by nursing due to hypotension and hypoglycemia, rapid response called. Objective:     Vitals:   Temp (24hrs), Av.3 °F (37.9 °C), Min:97.6 °F (36.4 °C), Max:102.6 °F (39.2 °C)    Temp:  [97.6 °F (36.4 °C)-102.6 °F (39.2 °C)] 102.6 °F (39.2 °C)  HR:  [] 101  Resp:  [19-24] 23  BP: ()/(48-84) 95/56  SpO2:  [95 %-98 %] 95 %  Body mass index is 24.33 kg/m². Input and Output Summary (last 24 hours):      Intake/Output Summary (Last 24 hours) at 10/30/2023 0831  Last data filed at 10/30/2023 4867  Gross per 24 hour   Intake 1040 ml   Output 200 ml   Net 840 ml       Physical Exam:   Physical Exam  Constitutional:       General: Marianela Raymundo is not in acute distress. Appearance: Normal appearance. Marianela Raymundo is ill-appearing. Marianela Raymundo is not toxic-appearing. HENT:      Head: Normocephalic and atraumatic. Nose: Nose normal.   Eyes:      Extraocular Movements: Extraocular movements intact. Cardiovascular:      Rate and Rhythm: Tachycardia present. Pulmonary:      Effort: Pulmonary effort is normal.   Abdominal:      General: There is no distension. Skin:     Coloration: Skin is not pale. Neurological:      Mental Status: Marianela Raymundo is alert and oriented to person, place, and time. Mental status is at baseline. Psychiatric:         Mood and Affect: Mood normal.         Behavior: Behavior normal.          Additional Data:     Labs:  Results from last 7 days   Lab Units 10/30/23  0407 10/27/23  0526 10/26/23  0636   WBC Thousand/uL 2.62*   < > 5.54   HEMOGLOBIN g/dL 9.4*   < > 8.1*   HEMATOCRIT % 29.6*   < > 24.0*   PLATELETS Thousands/uL 113*   < > 75*   NEUTROS PCT %  --   --  77*   LYMPHS PCT %  --   --  11*   MONOS PCT %  --   --  9   EOS PCT %  --   --  2    < > = values in this interval not displayed. Results from last 7 days   Lab Units 10/30/23  0407 10/24/23  0534 10/23/23  1133   SODIUM mmol/L 132*   < > 135   POTASSIUM mmol/L 4.0   < > 4.5   CHLORIDE mmol/L 97   < > 108   CO2 mmol/L 20*   < > 16*   BUN mg/dL 72*   < > 56*   CREATININE mg/dL 6.40*   < > 4.25*   ANION GAP mmol/L 15   < > 11   CALCIUM mg/dL 7.9*   < > 7.7*   ALBUMIN g/dL  --   --  3.3*   TOTAL BILIRUBIN mg/dL  --   --  0.44   ALK PHOS U/L  --   --  59   ALT U/L  --   --  16   AST U/L  --   --  37   GLUCOSE RANDOM mg/dL 163*   < > 188*    < > = values in this interval not displayed.          Results from last 7 days   Lab Units 10/29/23  2108 10/29/23  1634 10/29/23  1054 10/28/23  8397 10/28/23  3381 10/28/23  9403 10/28/23  1238 10/28/23  0826 10/27/23  2143 10/27/23  1709 10/27/23  1147 10/27/23  0801   POC GLUCOSE mg/dl 241* 218* 166* 99 110 123 144* 116 150* 199* 211* 139         Results from last 7 days   Lab Units 10/30/23  0410 10/29/23  0553 10/26/23  0154   LACTIC ACID mmol/L 1.6  --   --    PROCALCITONIN ng/ml 49.65* 56.62* 4.11*       Lines/Drains:  Invasive Devices       Central Venous Catheter Line  Duration             Port A Cath Right -- days              Peripheral Intravenous Line  Duration             Peripheral IV 10/28/23 Left Antecubital 1 day                    Central Line:  Goal for removal: No longer needed. Will place order to discontinue. Imaging: No pertinent imaging reviewed. Recent Cultures (last 7 days):   Results from last 7 days   Lab Units 10/28/23  0603 10/27/23  1620 10/26/23  0155 10/23/23  2216 10/23/23  1534   BLOOD CULTURE  No Growth at 24 hrs. No Growth at 24 hrs. No Growth at 48 hrs. No Growth After 4 Days.  Staphylococcus aureus*  --    GRAM STAIN RESULT   --   --   --  Gram positive cocci in clusters*  --    URINE CULTURE   --   --   --   --  60,000-69,000 cfu/ml Staphylococcus aureus*  30,000-39,000 cfu/ml Alpha Hemolytic Streptococcus NOT Enterococcus*       Last 24 Hours Medication List:   Current Facility-Administered Medications   Medication Dose Route Frequency Provider Last Rate    acetaminophen  975 mg Oral Q6H PRN Mariely Macario PA-C      atorvastatin  80 mg Oral Daily With [employer], DO      cefazolin  1,000 mg Intravenous Q24H LUCY Ortiz 1,000 mg (10/30/23 0325)    clopidogrel  75 mg Oral Daily Cele Verdugo DO      dextromethorphan-guaiFENesin  10 mL Oral Q4H PRN Mariely Macario PA-C      dextrose           docusate sodium  100 mg Oral BID Reddy Mejia MD      heparin (porcine)  5,000 Units Subcutaneous Q8H [address] Cele Verdugo,       insulin glargine  10 Units Subcutaneous HS Reddy Mejia MD      insulin lispro  1-5 Units Subcutaneous TID AC [facility], DO      insulin lispro  3 Units Subcutaneous TID With Meals Samina Klein MD      meclizine  25 mg Oral Q8H PRN [facility], DO      nortriptyline  10 mg Oral HS [facility], DO      ondansetron  4 mg Intravenous Q4H PRN Indra Mcdaniel PA-C      oxybutynin  5 mg Oral BID [facility], DO      oxyCODONE  2.5 mg Oral Q4H PRN Samina Klein MD      pantoprazole  40 mg Oral Daily [facility], DO      pregabalin  75 mg Oral BID [facility], DO      senna  2 tablet Oral Once Samina Klein MD      sodium bicarbonate  650 mg Oral BID after meals Homero Dickson MD      sucralfate  1 g Oral 4x Daily (AC & HS) [facility], DO      tacrolimus  1 mg Oral Q12H [address] [facility], DO      tamsulosin  0.4 mg Oral Daily With [facility], DO      temazepam  15 mg Oral HS PRN [facility], DO      zolpidem  5 mg Oral HS PRN James Kelley DO          Today, Patient Was Seen By: Deborah Wood MD    **Please Note: This note may have been constructed using a voice recognition system. **

## 2023-10-31 ENCOUNTER — ANESTHESIA (INPATIENT)
Dept: RADIOLOGY | Facility: HOSPITAL | Age: 73
DRG: 871 | End: 2023-10-31
Payer: COMMERCIAL

## 2023-10-31 ENCOUNTER — PREP FOR PROCEDURE (OUTPATIENT)
Dept: INTERVENTIONAL RADIOLOGY/VASCULAR | Facility: CLINIC | Age: 73
End: 2023-10-31

## 2023-10-31 ENCOUNTER — ANESTHESIA EVENT (INPATIENT)
Dept: RADIOLOGY | Facility: HOSPITAL | Age: 73
DRG: 871 | End: 2023-10-31
Payer: COMMERCIAL

## 2023-10-31 ENCOUNTER — APPOINTMENT (INPATIENT)
Dept: RADIOLOGY | Facility: HOSPITAL | Age: 73
DRG: 871 | End: 2023-10-31
Payer: COMMERCIAL

## 2023-10-31 DIAGNOSIS — C67.9 BLADDER CANCER METASTASIZED TO LUNG (HCC): Primary | ICD-10-CM

## 2023-10-31 DIAGNOSIS — C78.00 BLADDER CANCER METASTASIZED TO LUNG (HCC): Primary | ICD-10-CM

## 2023-10-31 LAB
ANION GAP SERPL CALCULATED.3IONS-SCNC: 11 MMOL/L
ANISOCYTOSIS BLD QL SMEAR: PRESENT
BACTERIA BLD CULT: NORMAL
BACTERIA UR CULT: NORMAL
BASOPHILS # BLD MANUAL: 0 THOUSAND/UL (ref 0–0.1)
BASOPHILS NFR MAR MANUAL: 0 % (ref 0–1)
BUN SERPL-MCNC: 73 MG/DL (ref 5–25)
BURR CELLS BLD QL SMEAR: PRESENT
CALCIUM SERPL-MCNC: 7.3 MG/DL (ref 8.4–10.2)
CHLORIDE SERPL-SCNC: 100 MMOL/L (ref 96–108)
CO2 SERPL-SCNC: 21 MMOL/L (ref 21–32)
CREAT SERPL-MCNC: 6.68 MG/DL (ref 0.6–1.3)
EOSINOPHIL # BLD MANUAL: 0.15 THOUSAND/UL (ref 0–0.4)
EOSINOPHIL NFR BLD MANUAL: 1 % (ref 0–6)
ERYTHROCYTE [DISTWIDTH] IN BLOOD BY AUTOMATED COUNT: 15.5 % (ref 11.6–15.1)
ERYTHROCYTE [DISTWIDTH] IN BLOOD BY AUTOMATED COUNT: 16.7 % (ref 11.6–15.1)
GLUCOSE SERPL-MCNC: 114 MG/DL (ref 65–140)
GLUCOSE SERPL-MCNC: 116 MG/DL (ref 65–140)
GLUCOSE SERPL-MCNC: 136 MG/DL (ref 65–140)
GLUCOSE SERPL-MCNC: 97 MG/DL (ref 65–140)
GLUCOSE SERPL-MCNC: 98 MG/DL (ref 65–140)
HCT VFR BLD AUTO: 19.2 % (ref 36.5–46.1)
HCT VFR BLD AUTO: 20.9 % (ref 36.5–46.1)
HCT VFR BLD AUTO: 24.4 % (ref 36.5–46.1)
HGB BLD-MCNC: 6.3 G/DL (ref 12–15.4)
HGB BLD-MCNC: 6.9 G/DL (ref 12–15.4)
HGB BLD-MCNC: 8.5 G/DL (ref 12–15.4)
LYMPHOCYTES # BLD AUTO: 0.15 THOUSAND/UL (ref 0.6–4.47)
LYMPHOCYTES # BLD AUTO: 1 % (ref 14–44)
MACROCYTES BLD QL AUTO: PRESENT
MAGNESIUM SERPL-MCNC: 2 MG/DL (ref 1.9–2.7)
MCH RBC QN AUTO: 24.9 PG (ref 26.8–34.3)
MCH RBC QN AUTO: 26.2 PG (ref 26.8–34.3)
MCHC RBC AUTO-ENTMCNC: 33 G/DL (ref 31.4–37.4)
MCHC RBC AUTO-ENTMCNC: 34.8 G/DL (ref 31.4–37.4)
MCV RBC AUTO: 75 FL (ref 82–98)
MCV RBC AUTO: 76 FL (ref 82–98)
MONOCYTES # BLD AUTO: 0.15 THOUSAND/UL (ref 0–1.22)
MONOCYTES NFR BLD: 1 % (ref 4–12)
MRSA NOSE QL CULT: NORMAL
NEUTROPHILS # BLD MANUAL: 14.36 THOUSAND/UL (ref 1.85–7.62)
NEUTS BAND NFR BLD MANUAL: 29 % (ref 0–8)
NEUTS SEG NFR BLD AUTO: 68 % (ref 43–75)
OVALOCYTES BLD QL SMEAR: PRESENT
PHOSPHATE SERPL-MCNC: 4 MG/DL (ref 2.3–4.1)
PLATELET # BLD AUTO: 66 THOUSANDS/UL (ref 149–390)
PLATELET # BLD AUTO: 67 THOUSANDS/UL (ref 149–390)
PLATELET BLD QL SMEAR: ABNORMAL
POIKILOCYTOSIS BLD QL SMEAR: PRESENT
POLYCHROMASIA BLD QL SMEAR: PRESENT
POTASSIUM SERPL-SCNC: 4.6 MMOL/L (ref 3.5–5.3)
PROCALCITONIN SERPL-MCNC: 119.68 NG/ML
RBC # BLD AUTO: 2.77 MILLION/UL (ref 3.88–5.12)
RBC # BLD AUTO: 3.25 MILLION/UL (ref 3.88–5.12)
RBC MORPH BLD: PRESENT
SODIUM SERPL-SCNC: 132 MMOL/L (ref 135–147)
TACROLIMUS BLD-MCNC: 7.5 NG/ML (ref 3–15)
TARGETS BLD QL SMEAR: PRESENT
VANCOMYCIN SERPL-MCNC: 18.3 UG/ML (ref 10–20)
WBC # BLD AUTO: 14.8 THOUSAND/UL (ref 4.31–10.16)
WBC # BLD AUTO: 15.04 THOUSAND/UL (ref 4.31–10.16)

## 2023-10-31 PROCEDURE — 50432 PLMT NEPHROSTOMY CATHETER: CPT | Performed by: RADIOLOGY

## 2023-10-31 PROCEDURE — 80048 BASIC METABOLIC PNL TOTAL CA: CPT

## 2023-10-31 PROCEDURE — 99232 SBSQ HOSP IP/OBS MODERATE 35: CPT | Performed by: STUDENT IN AN ORGANIZED HEALTH CARE EDUCATION/TRAINING PROGRAM

## 2023-10-31 PROCEDURE — 80202 ASSAY OF VANCOMYCIN: CPT | Performed by: STUDENT IN AN ORGANIZED HEALTH CARE EDUCATION/TRAINING PROGRAM

## 2023-10-31 PROCEDURE — 85007 BL SMEAR W/DIFF WBC COUNT: CPT

## 2023-10-31 PROCEDURE — 85027 COMPLETE CBC AUTOMATED: CPT

## 2023-10-31 PROCEDURE — 0T9030Z DRAINAGE OF RIGHT KIDNEY WITH DRAINAGE DEVICE, PERCUTANEOUS APPROACH: ICD-10-PCS | Performed by: STUDENT IN AN ORGANIZED HEALTH CARE EDUCATION/TRAINING PROGRAM

## 2023-10-31 PROCEDURE — 87147 CULTURE TYPE IMMUNOLOGIC: CPT | Performed by: STUDENT IN AN ORGANIZED HEALTH CARE EDUCATION/TRAINING PROGRAM

## 2023-10-31 PROCEDURE — 85027 COMPLETE CBC AUTOMATED: CPT | Performed by: STUDENT IN AN ORGANIZED HEALTH CARE EDUCATION/TRAINING PROGRAM

## 2023-10-31 PROCEDURE — 87186 SC STD MICRODIL/AGAR DIL: CPT | Performed by: STUDENT IN AN ORGANIZED HEALTH CARE EDUCATION/TRAINING PROGRAM

## 2023-10-31 PROCEDURE — 85014 HEMATOCRIT: CPT

## 2023-10-31 PROCEDURE — 87070 CULTURE OTHR SPECIMN AEROBIC: CPT | Performed by: STUDENT IN AN ORGANIZED HEALTH CARE EDUCATION/TRAINING PROGRAM

## 2023-10-31 PROCEDURE — 87185 SC STD ENZYME DETCJ PER NZM: CPT | Performed by: STUDENT IN AN ORGANIZED HEALTH CARE EDUCATION/TRAINING PROGRAM

## 2023-10-31 PROCEDURE — P9058 RBC, L/R, CMV-NEG, IRRAD: HCPCS

## 2023-10-31 PROCEDURE — 99232 SBSQ HOSP IP/OBS MODERATE 35: CPT | Performed by: NURSE PRACTITIONER

## 2023-10-31 PROCEDURE — 87077 CULTURE AEROBIC IDENTIFY: CPT | Performed by: STUDENT IN AN ORGANIZED HEALTH CARE EDUCATION/TRAINING PROGRAM

## 2023-10-31 PROCEDURE — C1894 INTRO/SHEATH, NON-LASER: HCPCS

## 2023-10-31 PROCEDURE — C1769 GUIDE WIRE: HCPCS

## 2023-10-31 PROCEDURE — 50432 PLMT NEPHROSTOMY CATHETER: CPT

## 2023-10-31 PROCEDURE — BT1D1ZZ FLUOROSCOPY OF RIGHT KIDNEY, URETER AND BLADDER USING LOW OSMOLAR CONTRAST: ICD-10-PCS | Performed by: STUDENT IN AN ORGANIZED HEALTH CARE EDUCATION/TRAINING PROGRAM

## 2023-10-31 PROCEDURE — 99233 SBSQ HOSP IP/OBS HIGH 50: CPT | Performed by: STUDENT IN AN ORGANIZED HEALTH CARE EDUCATION/TRAINING PROGRAM

## 2023-10-31 PROCEDURE — 82948 REAGENT STRIP/BLOOD GLUCOSE: CPT

## 2023-10-31 PROCEDURE — 30233N1 TRANSFUSION OF NONAUTOLOGOUS RED BLOOD CELLS INTO PERIPHERAL VEIN, PERCUTANEOUS APPROACH: ICD-10-PCS | Performed by: STUDENT IN AN ORGANIZED HEALTH CARE EDUCATION/TRAINING PROGRAM

## 2023-10-31 PROCEDURE — 84100 ASSAY OF PHOSPHORUS: CPT

## 2023-10-31 PROCEDURE — 84145 PROCALCITONIN (PCT): CPT

## 2023-10-31 PROCEDURE — 80197 ASSAY OF TACROLIMUS: CPT

## 2023-10-31 PROCEDURE — 87075 CULTR BACTERIA EXCEPT BLOOD: CPT | Performed by: STUDENT IN AN ORGANIZED HEALTH CARE EDUCATION/TRAINING PROGRAM

## 2023-10-31 PROCEDURE — 99232 SBSQ HOSP IP/OBS MODERATE 35: CPT | Performed by: INTERNAL MEDICINE

## 2023-10-31 PROCEDURE — 87205 SMEAR GRAM STAIN: CPT | Performed by: STUDENT IN AN ORGANIZED HEALTH CARE EDUCATION/TRAINING PROGRAM

## 2023-10-31 PROCEDURE — 85018 HEMOGLOBIN: CPT

## 2023-10-31 PROCEDURE — 83735 ASSAY OF MAGNESIUM: CPT

## 2023-10-31 RX ORDER — ALBUMIN, HUMAN INJ 5% 5 %
25 SOLUTION INTRAVENOUS ONCE
Status: COMPLETED | OUTPATIENT
Start: 2023-10-31 | End: 2023-10-31

## 2023-10-31 RX ORDER — SODIUM CHLORIDE 9 MG/ML
INJECTION, SOLUTION INTRAVENOUS CONTINUOUS PRN
Status: DISCONTINUED | OUTPATIENT
Start: 2023-10-31 | End: 2023-10-31

## 2023-10-31 RX ORDER — ROCURONIUM BROMIDE 10 MG/ML
INJECTION, SOLUTION INTRAVENOUS AS NEEDED
Status: DISCONTINUED | OUTPATIENT
Start: 2023-10-31 | End: 2023-10-31

## 2023-10-31 RX ORDER — LIDOCAINE HYDROCHLORIDE 10 MG/ML
INJECTION, SOLUTION EPIDURAL; INFILTRATION; INTRACAUDAL; PERINEURAL AS NEEDED
Status: DISCONTINUED | OUTPATIENT
Start: 2023-10-31 | End: 2023-10-31

## 2023-10-31 RX ORDER — DEXAMETHASONE SODIUM PHOSPHATE 10 MG/ML
INJECTION, SOLUTION INTRAMUSCULAR; INTRAVENOUS AS NEEDED
Status: DISCONTINUED | OUTPATIENT
Start: 2023-10-31 | End: 2023-10-31

## 2023-10-31 RX ORDER — LIDOCAINE HYDROCHLORIDE 10 MG/ML
INJECTION, SOLUTION EPIDURAL; INFILTRATION; INTRACAUDAL; PERINEURAL AS NEEDED
Status: COMPLETED | OUTPATIENT
Start: 2023-10-31 | End: 2023-10-31

## 2023-10-31 RX ORDER — PROPOFOL 10 MG/ML
INJECTION, EMULSION INTRAVENOUS AS NEEDED
Status: DISCONTINUED | OUTPATIENT
Start: 2023-10-31 | End: 2023-10-31

## 2023-10-31 RX ORDER — LIDOCAINE HYDROCHLORIDE 20 MG/ML
1 JELLY TOPICAL ONCE
Status: COMPLETED | OUTPATIENT
Start: 2023-10-31 | End: 2023-10-31

## 2023-10-31 RX ORDER — ONDANSETRON 2 MG/ML
INJECTION INTRAMUSCULAR; INTRAVENOUS AS NEEDED
Status: DISCONTINUED | OUTPATIENT
Start: 2023-10-31 | End: 2023-10-31

## 2023-10-31 RX ORDER — SODIUM CHLORIDE 9 MG/ML
75 INJECTION, SOLUTION INTRAVENOUS CONTINUOUS
OUTPATIENT
Start: 2023-10-31

## 2023-10-31 RX ORDER — SODIUM CHLORIDE 9 MG/ML
10 INJECTION INTRAVENOUS DAILY
Qty: 900 ML | Refills: 0 | Status: ON HOLD | OUTPATIENT
Start: 2023-10-31 | End: 2024-01-29

## 2023-10-31 RX ADMIN — DEXAMETHASONE SODIUM PHOSPHATE 10 MG: 10 INJECTION, SOLUTION INTRAMUSCULAR; INTRAVENOUS at 14:55

## 2023-10-31 RX ADMIN — SODIUM BICARBONATE 650 MG TABLET 650 MG: at 08:30

## 2023-10-31 RX ADMIN — PANTOPRAZOLE SODIUM 40 MG: 40 TABLET, DELAYED RELEASE ORAL at 08:31

## 2023-10-31 RX ADMIN — SUCRALFATE 1 G: 1 TABLET ORAL at 06:20

## 2023-10-31 RX ADMIN — NORTRIPTYLINE HYDROCHLORIDE 10 MG: 10 CAPSULE ORAL at 21:12

## 2023-10-31 RX ADMIN — HEPARIN SODIUM 5000 UNITS: 5000 INJECTION INTRAVENOUS; SUBCUTANEOUS at 21:12

## 2023-10-31 RX ADMIN — HEPARIN SODIUM 5000 UNITS: 5000 INJECTION INTRAVENOUS; SUBCUTANEOUS at 16:00

## 2023-10-31 RX ADMIN — SUGAMMADEX 200 MG: 100 INJECTION, SOLUTION INTRAVENOUS at 15:06

## 2023-10-31 RX ADMIN — LIDOCAINE HYDROCHLORIDE 50 MG: 10 INJECTION, SOLUTION EPIDURAL; INFILTRATION; INTRACAUDAL; PERINEURAL at 14:21

## 2023-10-31 RX ADMIN — CEFEPIME 1000 MG: 1 INJECTION, POWDER, FOR SOLUTION INTRAMUSCULAR; INTRAVENOUS at 10:35

## 2023-10-31 RX ADMIN — SUCRALFATE 1 G: 1 TABLET ORAL at 10:38

## 2023-10-31 RX ADMIN — ATORVASTATIN CALCIUM 80 MG: 80 TABLET, FILM COATED ORAL at 16:00

## 2023-10-31 RX ADMIN — ROCURONIUM BROMIDE 40 MG: 10 INJECTION, SOLUTION INTRAVENOUS at 14:21

## 2023-10-31 RX ADMIN — SODIUM BICARBONATE 650 MG TABLET 650 MG: at 17:25

## 2023-10-31 RX ADMIN — HEPARIN SODIUM 5000 UNITS: 5000 INJECTION INTRAVENOUS; SUBCUTANEOUS at 06:09

## 2023-10-31 RX ADMIN — PHENYLEPHRINE HYDROCHLORIDE 50 MCG/MIN: 10 INJECTION INTRAVENOUS at 14:30

## 2023-10-31 RX ADMIN — SUCRALFATE 1 G: 1 TABLET ORAL at 16:00

## 2023-10-31 RX ADMIN — ZOLPIDEM TARTRATE 5 MG: 5 TABLET ORAL at 21:12

## 2023-10-31 RX ADMIN — LIDOCAINE HYDROCHLORIDE 1 APPLICATION: 20 JELLY TOPICAL at 23:20

## 2023-10-31 RX ADMIN — OXYBUTYNIN CHLORIDE 5 MG: 5 TABLET ORAL at 17:25

## 2023-10-31 RX ADMIN — TACROLIMUS 1 MG: 1 CAPSULE ORAL at 08:30

## 2023-10-31 RX ADMIN — PREGABALIN 75 MG: 50 CAPSULE ORAL at 08:30

## 2023-10-31 RX ADMIN — ALBUMIN (HUMAN) 25 G: 12.5 INJECTION, SOLUTION INTRAVENOUS at 06:10

## 2023-10-31 RX ADMIN — TEMAZEPAM 15 MG: 15 CAPSULE ORAL at 21:12

## 2023-10-31 RX ADMIN — ONDANSETRON 4 MG: 2 INJECTION INTRAMUSCULAR; INTRAVENOUS at 14:58

## 2023-10-31 RX ADMIN — PREGABALIN 75 MG: 50 CAPSULE ORAL at 17:25

## 2023-10-31 RX ADMIN — Medication 2.5 MG: at 23:45

## 2023-10-31 RX ADMIN — VANCOMYCIN HYDROCHLORIDE 750 MG: 750 INJECTION, SOLUTION INTRAVENOUS at 07:47

## 2023-10-31 RX ADMIN — TACROLIMUS 1 MG: 1 CAPSULE ORAL at 21:12

## 2023-10-31 RX ADMIN — CHLORHEXIDINE GLUCONATE 15 ML: 1.2 SOLUTION ORAL at 08:30

## 2023-10-31 RX ADMIN — CHLORHEXIDINE GLUCONATE 15 ML: 1.2 SOLUTION ORAL at 21:12

## 2023-10-31 RX ADMIN — IOHEXOL 10 ML: 350 INJECTION, SOLUTION INTRAVENOUS at 15:21

## 2023-10-31 RX ADMIN — OXYBUTYNIN CHLORIDE 5 MG: 5 TABLET ORAL at 08:31

## 2023-10-31 RX ADMIN — SUCRALFATE 1 G: 1 TABLET ORAL at 21:12

## 2023-10-31 RX ADMIN — LIDOCAINE HYDROCHLORIDE 20 ML: 10 INJECTION, SOLUTION EPIDURAL; INFILTRATION; INTRACAUDAL; PERINEURAL at 14:54

## 2023-10-31 RX ADMIN — PROPOFOL 100 MG: 10 INJECTION, EMULSION INTRAVENOUS at 14:21

## 2023-10-31 RX ADMIN — SODIUM CHLORIDE: 0.9 INJECTION, SOLUTION INTRAVENOUS at 14:15

## 2023-10-31 NOTE — ANESTHESIA POSTPROCEDURE EVALUATION
Post-Op Assessment Note    CV Status:  Stable    Pain management: adequate     Mental Status:  Sleepy   Hydration Status:  Euvolemic   PONV Controlled:  Controlled   Airway Patency:  Patent      Post Op Vitals Reviewed: Yes      Staff: CRNA   Comments: will transport to MICU with monitors, O2 & IR RN    No notable events documented.     BP   120/57   Temp      Pulse  75   Resp     SpO2   100 FM 6l

## 2023-10-31 NOTE — BRIEF OP NOTE (RAD/CATH)
INTERVENTIONAL RADIOLOGY PROCEDURE NOTE    Date: 10/31/2023    Procedure: IR NEPHROSTOMY TUBE PLACEMENT     Preoperative diagnosis:   1. Fall, initial encounter    2. Acute-on-chronic kidney injury     3. Elevated serum creatinine    4. CKD (chronic kidney disease)    5. Acute cystitis without hematuria    6. Staphylococcus aureus bacteremia    7. MSSA bacteremia    8. Septic shock ([address])    9. Bladder cancer 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 [facility])         Postoperative diagnosis: Same. Surgeon: Shaylee Quiles MD     Assistant: None. No qualified resident was available. Blood loss: minimal    Specimens: urine    Findings: Successful placement of a 10 [place] right nephrostomy tube into a dilated renal collecting system with tan purulent urine removed. Plan:  Tube to bag drainage, flush with 10 cc q8 hours in the hospital and qd at home and return for ureteral stent removal and PCN change. Complications: None immediate.     Anesthesia: MAC sedation WDL

## 2023-10-31 NOTE — CASE MANAGEMENT
Case Management Discharge Planning Note    Patient name Lauren Sylvester  Location MICU 09/MICU 09 MRN [id number]  : 1950 Date 10/31/2023       Current Admission Date: 10/23/2023  Current Admission Diagnosis:Fall   Patient Active Problem List    Diagnosis Date Noted    Septic shock ([address]) 10/30/2023    MSSA bacteremia 10/26/2023    Fall 10/23/2023    Bladder cancer 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 ([address]) 10/23/2023    Insomnia 10/23/2023    History of liver transplant ([address]) 10/23/2023    T2DM (type 2 diabetes mellitus) ([address]) 10/23/2023    Acute on chronic renal failure  10/23/2023    UTI (urinary tract infection) 10/23/2023    Dementia ([address]) 10/23/2023    CKD (chronic kidney disease) 10/23/2023    Thrombocytopenia ([address]) 10/23/2023    History of CVA (cerebrovascular accident) 10/23/2023    Anemia 10/23/2023      LOS (days): 8  Geometric Mean LOS (GMLOS) (days): 3.90  Days to GMLOS:-4.1     OBJECTIVE:  Risk of Unplanned Readmission Score: 36.5         Current admission status: Inpatient   Preferred Pharmacy:   [address] [place] [address] TO E-PRESCRIBE  No address on file      Primary Care Provider: Shaheen Fortune DO    Primary Insurance: [facility]  Secondary Insurance:     DISCHARGE DETAILS:    Contacts  Patient Contacts: Greg Wiggins  Relationship to Patient[de-identified] Family  Contact Method: Phone  Phone Number: [phone] or [phone]  Reason/Outcome: Discharge Planning    Other Referral/Resources/Interventions Provided:  Interventions: Short Term Rehab  Referral Comments: CM spoke with pt's s/o regarding rehab choices. Accepting facilities reviewed, s/o interested in placement at [address] [facility] for placement when pt is medically cleared for d/c. [facility] reserved in [place]. CM team will continue to follow.     Treatment Team Recommendation: Short Term Rehab  Discharge Destination Plan[de-identified] Short Term Rehab

## 2023-10-31 NOTE — PLAN OF CARE
Problem: Potential for Falls  Goal: Patient will remain free of falls  Description: INTERVENTIONS:  - Educate patient/family on patient safety including physical limitations  - Instruct patient to call for assistance with activity   - Consult OT/PT to assist with strengthening/mobility   - Keep Call bell within reach  - Keep bed low and locked with side rails adjusted as appropriate  - Keep care items and personal belongings within reach  - Initiate and maintain comfort rounds  - Make Fall Risk Sign visible to staff  - Offer Toileting every 2 Hours, in advance of need  - Initiate/Maintain bed alarm  - Apply yellow socks and bracelet for high fall risk patients  - Consider moving patient to room near nurses station  Outcome: Progressing     Problem: MOBILITY - ADULT  Goal: Maintain or return to baseline ADL function  Description: INTERVENTIONS:  -  Assess patient's ability to carry out ADLs; assess patient's baseline for ADL function and identify physical deficits which impact ability to perform ADLs (bathing, care of mouth/teeth, toileting, grooming, dressing, etc.)  - Assess/evaluate cause of self-care deficits   - Assess range of motion  - Assess patient's mobility; develop plan if impaired  - Assess patient's need for assistive devices and provide as appropriate  - Encourage maximum independence but intervene and supervise when necessary  - Involve family in performance of ADLs  - Assess for home care needs following discharge   - Consider OT consult to assist with ADL evaluation and planning for discharge  - Provide patient education as appropriate  Outcome: Progressing  Goal: Maintains/Returns to pre admission functional level  Description: INTERVENTIONS:  - Perform BMAT or MOVE assessment daily.   - Set and communicate daily mobility goal to care team and patient/family/caregiver. - Collaborate with rehabilitation services on mobility goals if consulted  - Perform Range of Motion 4 times a day.   - Reposition patient every 2 hours.   - Dangle patient 1 times a day  - Stand patient 1 times a day  - Ambulate patient 1 times a day  - Out of bed to chair 1 times a day   - Out of bed for meals 1 times a day  - Out of bed for toileting  - Record patient progress and toleration of activity level   Outcome: Progressing     Problem: PAIN - ADULT  Goal: Verbalizes/displays adequate comfort level or baseline comfort level  Description: Interventions:  - Encourage patient to monitor pain and request assistance  - Assess pain using appropriate pain scale  - Administer analgesics based on type and severity of pain and evaluate response  - Implement non-pharmacological measures as appropriate and evaluate response  - Consider cultural and social influences on pain and pain management  - Notify physician/advanced practitioner if interventions unsuccessful or patient reports new pain  Outcome: Progressing     Problem: INFECTION - ADULT  Goal: Absence or prevention of progression during hospitalization  Description: INTERVENTIONS:  - Assess and monitor for signs and symptoms of infection  - Monitor lab/diagnostic results  - Monitor all insertion sites, i.e. indwelling lines, tubes, and drains  - Monitor endotracheal if appropriate and nasal secretions for changes in amount and color  - [place] appropriate cooling/warming therapies per order  - Administer medications as ordered  - Instruct and encourage patient and family to use good hand hygiene technique  - Identify and instruct in appropriate isolation precautions for identified infection/condition  Outcome: Progressing  Goal: Absence of fever/infection during neutropenic period  Description: INTERVENTIONS:  - Monitor WBC    Outcome: Progressing     Problem: Prexisting or High Potential for Compromised Skin Integrity  Goal: Skin integrity is maintained or improved  Description: INTERVENTIONS:  - Identify patients at risk for skin breakdown  - Assess and monitor skin integrity  - Assess and monitor nutrition and hydration status  - Monitor labs   - Assess for incontinence   - Turn and reposition patient  - Assist with mobility/ambulation  - Relieve pressure over bony prominences  - Avoid friction and shearing  - Provide appropriate hygiene as needed including keeping skin clean and dry  - Evaluate need for skin moisturizer/barrier cream  - Collaborate with interdisciplinary team   - Patient/family teaching  - Consider wound care consult   Outcome: Progressing

## 2023-10-31 NOTE — PROGRESS NOTES
NEPHROLOGY PROGRESS NOTE   Taj Gillette 74 y.o. adult MRN: [id number]  [facility]: [id number]  Reason for Consult: TAVON    ASSESSMENT AND PLAN:  TAVON on CKD stage IV, baseline creatinine around 3.6, follows with Dr. Connor Code  -TAVON suspect secondary to ATN, UTI, MSSA bacteremia, hypotension, this is in the setting of right renal atrophy with moderate right hydronephrosis  -Currently remains off Levophed at the time of my encounter.  -Creatinine continues to slowly increase up to 6.6 today. Urine output lower.  -Status post IV fluid resuscitation yesterday. Currently remains off.  -Continue to closely monitor for need for dialysis. No emergent indication for dialysis now. -Significant other Josefa (POA) provided dialysis consent which is in the chart. Patient also provided verbal consent. -BMP in a.m. Right renal atrophy with moderate right hydronephrosis, status post right ureteral stent.  -Status post Mcallister catheter, appreciate urology input regarding PCN tube placement. 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  -Urology following. As per medical records review, did not tolerate chemo due to toxicity. s/p radiation. MSSA bacteremia, septic shock suspect in the setting of ascending UTI, status post right ureteral stent, currently antibiotic as per ID. -Currently remains off Levophed at the time of my encounter. History of liver transplant about 20 years ago as per patient. Now remains on tacrolimus. Tacrolimus trough level 7.6 on 10/30/2023   -Recommend discussion with GI team if any adjustment in tacrolimus dosing needed. Metabolic acidosis, bicarb level relatively stable 21 and slightly improved. lactic acid level 1.6 normal.  -Currently on p.o. sodium bicarb supplement.     Discussed above plan in detail with ICU team regarding monitoring renal function closely, holding off dialysis for now, agree with urology plan for PCN tube, continue sodium bicarb and blood transfusion and they agree with above recommendations. SUBJECTIVE:  Patient seen and examined at bedside. He denies any nausea or vomiting. Currently active, alert, oriented.   Urine output remains poor    OBJECTIVE:  Current Weight: Weight - Scale: 51.3 kg (113 lb 1.5 oz)  Vitals:    10/31/23 0929   BP: 106/61   Pulse: 76   Resp: (!) 26   Temp: (!) 97.4 °F (36.3 °C)   SpO2: 99%       Intake/Output Summary (Last 24 hours) at 10/31/2023 0944  Last data filed at 10/31/2023 8671  Gross per 24 hour   Intake 2275.56 ml   Output 245 ml   Net 2030.56 ml     Wt Readings from Last 3 Encounters:   10/31/23 51.3 kg (113 lb 1.5 oz)     Temp Readings from Last 3 Encounters:   10/31/23 (!) 97.4 °F (36.3 °C) (Oral)     BP Readings from Last 3 Encounters:   10/31/23 106/61     Pulse Readings from Last 3 Encounters:   10/31/23 76        Physical Examination:  Eyes: Mild conjunctival pallor present  Neck: No obvious lymphadenopathy appreciated  Respiratory: Bilateral air entry present  CVS: No significant edema  GI: Soft, nondistended  CNS: Active, alert, follows commands  Skin: No new rash  Musculoskeletal: No obvious new gross deformity noted    Medications:    Current Facility-Administered Medications:     acetaminophen (TYLENOL) tablet 975 mg, 975 mg, Oral, Q6H PRN, Marry Macias PA-C    atorvastatin (LIPITOR) tablet 80 mg, 80 mg, Oral, Daily With Dinner, [employer], DO, 80 mg at 10/30/23 1803    cefepime (MAXIPIME) 1,000 mg in dextrose 5 % 50 mL IVPB, 1,000 mg, Intravenous, Q24H, Estela Villalba, , Stopped at 10/30/23 1139    chlorhexidine (PERIDEX) 0.12 % oral rinse 15 mL, 15 mL, Mouth/Throat, Q12H [address] Castellanos, DO, 15 mL at 10/31/23 0830    clopidogrel (PLAVIX) tablet 75 mg, 75 mg, Oral, Daily, Cele Verdugo DO, 75 mg at 10/30/23 1030    dextromethorphan-guaiFENesin (ROBITUSSIN DM) oral syrup 10 mL, 10 mL, Oral, Q4H PRN, Cam Huerta PA-C, 10 mL at 10/30/23 0442    docusate sodium (COLACE) capsule 100 mg, 100 mg, Oral, BID, Olayinka Zhang MD, 100 mg at 10/30/23 1803    heparin (porcine) subcutaneous injection 5,000 Units, 5,000 Units, Subcutaneous, Q8H [address], Cele Verdugo, DO, 5,000 Units at 10/31/23 0609    insulin lispro (HumaLOG) 100 units/mL subcutaneous injection 1-5 Units, 1-5 Units, Subcutaneous, TID AC, 1 Units at 10/29/23 1710 **AND** Fingerstick Glucose (POCT), , , TID AC, Cele Verdugo,     insulin lispro (HumaLOG) 100 units/mL subcutaneous injection 3 Units, 3 Units, Subcutaneous, TID With Meals, Olayinka Zhang MD, 3 Units at 10/29/23 1710    meclizine (ANTIVERT) tablet 25 mg, 25 mg, Oral, Q8H PRN, Cele Verdugo DO    norepinephrine (LEVOPHED) 4 mg (STANDARD CONCENTRATION) IV in sodium chloride 0.9% 250 mL, 1-30 mcg/min, Intravenous, Titrated, Lyn Rodriguez [facility], Held at 10/31/23 8777    nortriptyline (PAMELOR) capsule 10 mg, 10 mg, Oral, HS, Cele Verdugo DO, 10 mg at 10/30/23 2152    ondansetron (ZOFRAN) injection 4 mg, 4 mg, Intravenous, Q4H PRN, Cam Huerta PA-C, 4 mg at 10/30/23 0503    oxybutynin (DITROPAN) tablet 5 mg, 5 mg, Oral, BID, Cele Verdugo DO, 5 mg at 10/31/23 0831    oxyCODONE (ROXICODONE) split tablet 2.5 mg, 2.5 mg, Oral, Q4H PRN, Olayinka Zhang MD, 2.5 mg at 10/26/23 1406    pantoprazole (PROTONIX) EC tablet 40 mg, 40 mg, Oral, Daily, Cele Verdugo DO, 40 mg at 10/31/23 0831    pregabalin (LYRICA) capsule 75 mg, 75 mg, Oral, BID, Cele Verdugo, , 75 mg at 10/31/23 0830    senna (SENOKOT) tablet 17.2 mg, 2 tablet, Oral, Once, Olayinka Zhang MD    sodium bicarbonate tablet 650 mg, 650 mg, Oral, TID after meals, [employer], DO, 650 mg at 10/31/23 0830    sucralfate (CARAFATE) tablet 1 g, 1 g, Oral, 4x Daily (AC & HS), Cele Verdugo, DO, 1 g at 10/31/23 0620    tacrolimus (PROGRAF) capsule 1 mg, 1 mg, Oral, Q12H [facility], Cele Verdugo, DO, 1 mg at 10/31/23 0830    temazepam (RESTORIL) capsule 15 mg, 15 mg, Oral, HS PRN, Cele De [place], DO, 15 mg at 10/30/23 2202    vancomycin (VANCOCIN) IVPB (premix in dextrose) 1,000 mg 200 mL, 15 mg/kg, Intravenous, Daily PRN, Emily Linder MD    zolgeralddetapan Pickard) tablet 5 mg, 5 mg, Oral, HS PRN, [place] Verdugo, DO, 5 mg at 10/30/23 2202    Laboratory Results:  Results from last 7 days   Lab Units 10/31/23  0640 10/31/23  0456 10/30/23  1922 10/30/23  1414 10/30/23  1102 10/30/23  0900 10/30/23  0407 10/29/23  0553 10/28/23  0654 10/27/23  0526 10/26/23  0636 10/26/23  0154   WBC Thousand/uL  --  14.80*  --  20.64*  --   --  2.62* 8.79  --  6.99 5.54 6.08   HEMOGLOBIN g/dL 6.3* 6.9*  --  7.4*  --   --  9.4* 7.2*  --  8.9* 8.1* 8.5*   HEMATOCRIT % 19.2* 20.9*  --  21.4*  --   --  29.6* 22.9*  --  26.2* 24.0* 26.0*   PLATELETS Thousands/uL  --  66*  --  87*  --  77* 113* 101*  --  94* 75* 82*   SODIUM mmol/L  --  132* 132*  --  136  --  132* 134* 134* 135 134*  --    POTASSIUM mmol/L  --  4.6 5.1  --  3.1*  --  4.0 4.0 3.7 3.7 3.9  --    CHLORIDE mmol/L  --  100 99  --  100  --  97 101 103 103 104  --    CO2 mmol/L  --  21 20*  --  22  --  20* 21 22 21 20*  --    BUN mg/dL  --  73* 70*  --  73*  --  72* 57* 50* 47* 50*  --    CREATININE mg/dL  --  6.68* 6.24*  --  6.52*  --  6.40* 5.44* 4.67* 4.66* 4.85*  --    CALCIUM mg/dL  --  7.3* 7.1*  --  7.2*  --  7.9* 7.4* 7.4* 7.7* 7.0*  --    MAGNESIUM mg/dL  --  2.0  --   --  2.0  --   --   --   --  1.4*  --   --    PHOSPHORUS mg/dL  --  4.0  --   --   --   --   --   --   --  3.0  --   --        IR port removal   Final Result by Gina Joiner MD (10/30 3795)      Right chest port removal at bedside. Plan:      Return to IR as needed.       Workstation performed: [id number]         XR chest portable   Final Result by Juan Urrutia MD (10/31 6441)   Bilateral pulmonary nodules suspicious for tumor. Further assessment via CT recommended      Interval placement of left IJ CVP line without complication      Persistent suspected right pulmonary pneumonia               Workstation performed: [id number]         XR chest pa & lateral   Final Result by Kerrie Gunter MD (10/27 1140)   Possible bibasilar infiltrates or crowding of vessels. Bilateral metastasis unchanged. Workstation performed: [id number]         XR chest portable   Final Result by Jazmin Kidd MD (10/26 9951)      Mild opacity at the left base with loss of the left diaphragm. This could be due to atelectasis but pneumonia not excluded in the appropriate clinical setting. Bilateral lung metastases. Workstation performed: [id number]         CT abdomen pelvis with contrast   Final Result by Kait Hernandes MD (10/23 5812)      1. Redemonstration of right renal atrophy and moderate right hydronephrosis with a double-J right ureteral stent. There is mild diffuse right urothelial thickening and enhancement. There is also mild diffuse thickening of the urinary bladder and adjacent    fat stranding. This may be explained by an infectious etiology including right ureteritis and cystitis. 2. Skin thickening and/or subcutaneous edema in the periumbilical area may be due to history of trauma. Workstation performed: [id number]         CT head without contrast   Final Result by Christian Rocha DO (10/23 1300)   Addendum (preliminary) 1 of 1 by Christian Rocha DO (10/23 1300)   ADDENDUM:   Prior from July 31, 2023 performed under separate medical record number. Prior left-sided aneurysm clipping and local encephalomalacia is    unchanged. I personally discussed the CT brain and C-spine with Clarence    on 10/23/2023 1:00 PM.            Final   No acute intracranial abnormality.                   Workstation performed: [id number]         CT spine cervical without contrast   Final Result by Tomy Mario DO (10/23 1253)   No cervical spine fracture or traumatic malalignment. Workstation performed: [id number]         XR trauma multiple   Final Result by Mandy Garcia MD (10/23 1643)      Spiculated mass in the right upper lobe, little changed since 8/1/2023. Interval increase in size of bilateral pulmonary metastases. No evidence of acute traumatic abnormality in the chest.      The study was marked in [facility] for immediate notification. Workstation performed: [id number]         XR chest portable   Final Result by Mandy Garcia MD (10/24 5204)      Spiculated mass in the right upper lobe, little changed since 8/1/2023. Interval increase in size of bilateral pulmonary metastases. No evidence of acute traumatic abnormality in the chest.      The study was marked in [facility] for immediate notification. Workstation performed: Matthew Woodard kidney and bladder    (Results Pending)   IR nephrostomy tube placement    (Results Pending)       Portions of the record may have been created with voice recognition software. Occasional wrong word or "sound a like" substitutions may have occurred due to the inherent limitations of voice recognition software. Read the chart carefully and recognize, using context, where substitutions have occurred.

## 2023-10-31 NOTE — PROGRESS NOTES
Vancomycin IV Pharmacy-to-Dose Consultation    Maya Rothman is a 74 y.o. adult who is currently receiving Vancomycin IV with management by the Pharmacy Consult service. Relevant clinical data and objective / subjective history reviewed. Vancomycin Assessment:  Indication: Bacteremia (goal -600, trough >10)    Status: critically ill  Micro:   - 10/23 BC 1/2: MSSA  - 10/23 Urine: MSSA  Procalcitonin: 119.68  Renal Function:     Lab Results   Component Value Date    CREATININE 6.68 (H) 10/31/2023     Estimated Creatinine Clearance (by C-G formula based on SCr of 6.68 mg/dL (H))  Female: 5.9 mL/min (A)  Male: 6.9 mL/min (A)  When the gender of the patient is unspecified you will see values for both male and female. Dialysis: no, but may progress to dialysis in next 24h  Days of Therapy: 2  Current Dose: 750 mg IV daily prn for random level less than 15   Goal AUC / Trough: -600, trough >10   Last Level: 18.3 on 10/31  Assessment: Dose given today in anticipation of level decreasing below 15. Antibiotics escalated out of concern for new septic shock. ID following. Vancomycin Plan:  New Dosing: continue current regimen  Predicted Trough / AUC: N/A  Next Level: on 11/1 at 0600  Renal Function Monitoring: Daily BMP and [address] will continue to follow closely for s/sx of nephrotoxicity, infusion reactions and appropriateness of therapy. BMP and CBC will be ordered per protocol. We will continue to follow the patient’s culture results and clinical progress daily.        Frank Cabezas, PharmD, BCCCP  Critical Care Clinical Pharmacist  Available via [employer]

## 2023-10-31 NOTE — PHYSICAL THERAPY NOTE
Physical Therapy Cancellation Note    PT orders received chart review completed. Pt in AM receiving blood in PM off floor at IR and not appropriate to participate in skilled PT at this time. PT will follow and eval as medically appropriate.      10/31/23 1400   Note Type   Note type Cancelled Session   Cancel Reasons Medical status       Sonia Woodson, PT

## 2023-10-31 NOTE — QUICK NOTE
Called patient's significant other, Shirleyamrit Caceres, at number listed and provided update to port-a-cath removal and current patient status. All questions answered to best of ability.      Elie Beach  Emergency Medicine, PGY-III

## 2023-10-31 NOTE — PROGRESS NOTES
Progress Note - Urology  Maria Teresa Méndez 1950, 74 y.o. adult MRN: [id number]    [facility]: [id number]    UTI (urinary tract infection)  Assessment & Plan  Urine microscopic with innumerable WBCs, occasional bacteria  MSSA bacteremia  Previous urine culture positive for alphahemolytic strep, repeat culture pending  On levofed due to hypotension and fever  Wbc 14.80  Procalcitonin 49.65, 56.62 (10/29)  Lactic acid 2.7  CT: Mild fairly diffuse urothelial thickening and enhancement on the right, mild diffuse thickening of the urinary bladder and adjacent fat stranding last week  US ordered  Empiric antibiotics tailored to culture  Medical optimization per primary team  IR for PCN placement scheduled this afternoon with eventual removal of stent; patient and family are agreeable    Acute on chronic renal failure   Assessment & Plan  Creat 6.68 has been rising this admit, baseline 2.5-3.6  CT: Right renal atrophy and moderate right hydronephrosis with right double-J stent in place  Recommend hydration per primary team  Mcallister catheter placement for maximum drainage  Medical optimization per primary team  IR for right PCN scheduled for this afternoon      Bladder cancer 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 [facility])  Assessment & Plan  TURBT May 2022 for muscle invasive bladder cancer with inability to visualize distal right ureter which required nephrostomy tube eventually transition to PCNU and now with internalized stent  Did not tolerate chemotherapy due to toxicity  Completed radiation            Subjective: Patient resting in bed with family at bedside. He is scheduled for his percutaneous nephrostomy tube today at 1 PM.  He is aware that he may need long-term PCN placement with removal of his internalized stent. He voiced understanding of the same. Mcallister catheter draining yellow urine. He complains of some bladder spasms and was started on oxybutynin.   He is currently receiving blood transfusion for low hemoglobin. He is status post port removal by interventional radiology on 10/30.    24 HR EVENTS:   no significant events. Patient has  no complaints. Review of Systems   Constitutional:  Negative for activity change, appetite change, chills, fatigue, fever and unexpected weight change. HENT:  Negative for facial swelling. Eyes:  Negative for discharge. Respiratory: Negative. Negative for cough and shortness of breath. Cardiovascular:  Negative for chest pain and leg swelling. Gastrointestinal: Negative. Negative for abdominal distention, abdominal pain, constipation, diarrhea, nausea and vomiting. Endocrine: Negative. Genitourinary: Negative. Negative for decreased urine volume, dysuria, enuresis, flank pain, genital sores and hematuria. Musculoskeletal:  Negative for back pain and myalgias. Skin:  Negative for pallor and rash. Allergic/Immunologic: Negative. Negative for immunocompromised state. Neurological:  Negative for facial asymmetry and speech difficulty. Psychiatric/Behavioral:  Negative for agitation and confusion. Objective:  Nursing Rounds:   Vitals: Blood pressure 116/66, pulse 74, temperature 97.6 °F (36.4 °C), temperature source Oral, resp. rate (!) 28, height 5' 3" (1.6 m), weight 51.3 kg (113 lb 1.5 oz), SpO2 99 %. ,Body mass index is 20.03 kg/m². INS & OUTS:  I/O last 24 hours: In: 2840.8 [P.O.:180; I.V.:1810.8; Blood:350; IV Piggyback:500]  Out: 280 [Urine:280]    Physical Exam  Vitals and nursing note reviewed. Constitutional:       General: Rah Palacio is not in acute distress. Appearance: Normal appearance. Rah Palacio is not ill-appearing, toxic-appearing or diaphoretic. HENT:      Head: Normocephalic. Cardiovascular:      Rate and Rhythm: Normal rate. Pulmonary:      Effort: Pulmonary effort is normal. No respiratory distress. Abdominal:      General: Abdomen is flat. There is no distension. Palpations: Abdomen is soft. Tenderness: There is no abdominal tenderness. There is no guarding or rebound. Genitourinary:     Comments: Catheter draining clear yellow urine  Musculoskeletal:         General: No swelling. Cervical back: Normal range of motion. Skin:     General: Skin is warm and dry. Neurological:      General: No focal deficit present. Mental Status: Theron Enrique is alert and oriented to person, place, and time. Psychiatric:         Mood and Affect: Mood normal.         Behavior: Behavior normal.         Thought Content: Thought content normal.         Judgment: Judgment normal.         Imaging:  none  Imaging reviewed - both report and images personally reviewed. Labs:  Recent Labs     10/29/23  0553 10/30/23  0407 10/30/23  1414 10/31/23  0456   WBC 8.79 2.62* 20.64* 14.80*       Recent Labs     10/29/23  0553 10/30/23  0407 10/30/23  1414 10/31/23  0456 10/31/23  0640   HGB 7.2* 9.4* 7.4* 6.9* 6.3*     Recent Labs     10/29/23  0553 10/30/23  0407 10/30/23  1414 10/31/23  0456 10/31/23  0640   HCT 22.9* 29.6* 21.4* 20.9* 19.2*     Recent Labs     10/29/23  0553 10/30/23  0407 10/30/23  1102 10/30/23  1922 10/31/23  0456   CREATININE 5.44* 6.40* 6.52* 6.24* 6.68*     Lab Results   Component Value Date    HGB 6.3 (LL) 10/31/2023    HCT 19.2 (L) 10/31/2023    WBC 14.80 (H) 10/31/2023    PLT 66 (L) 10/31/2023     Lab Results   Component Value Date    K 4.6 10/31/2023     10/31/2023    CO2 21 10/31/2023    BUN 73 (H) 10/31/2023    CREATININE 6.68 (H) 10/31/2023    CALCIUM 7.3 (L) 10/31/2023     Urinalysis: Innumerable WBC's per HPF, innumerable RBC's per HPF, and occasional + bacteria  Urine Culture: Pending    History:    Past Medical History:   Diagnosis Date    Cirrhosis ([address])     Dementia ([address])      Past Surgical History:   Procedure Laterality Date    IR PORT REMOVAL  10/30/2023     No family history on file.   Social History     Socioeconomic History    Marital status: Single     Spouse name: None    Number of children: None    Years of education: None    Highest education level: None   Occupational History    None   Tobacco Use    Smoking status: None    Smokeless tobacco: None   Substance and Sexual Activity    Alcohol use: None    Drug use: None    Sexual activity: None   Other Topics Concern    None   Social History Narrative    None     Social Determinants of Health     Financial Resource Strain: Not on file   Food Insecurity: No Food Insecurity (10/26/2023)    Hunger Vital Sign     Worried About Running Out of Food in the Last Year: Never true     Ran Out of Food in the Last Year: Never true   Transportation Needs: No Transportation Needs (10/26/2023)    PRAPARE - Transportation     Lack of Transportation (Medical): No     Lack of Transportation (Non-Medical):  No   Physical Activity: Not on file   Stress: Not on file   Social Connections: Not on file   Intimate Partner Violence: Not on file   Housing Stability: Low Risk  (10/26/2023)    Housing Stability Vital Sign     Unable to Pay for Housing in the Last Year: No     Number of Places Lived in the Last Year: 1     Unstable Housing in the Last Year: No       The following portions of the patient's history were reviewed and updated as appropriate: allergies, current medications, past family history, past medical history, past social history, past surgical history and problem list    LUCY Benitez  Date: 10/31/2023 Time: 12:30 PM

## 2023-10-31 NOTE — PROGRESS NOTES
Progress Note - Infectious Disease   Corona Poag 74 y.o. adult MRN: [id number]  Unit/Bed#: MICU 09 Encounter: [id number]      Impression/Plan:    1. Ascending urinary tract infection. In the setting of #5 below and internal right ureteral stent. Patient with reported dysuria prior to admission, UA with innumerable white blood cells. Urine culture is growing MSSA and there is low colony count of alpha hemolytic strep. CT A/P shows moderate right hydronephrosis, right urothelial thickening and enhancement and bladder wall thickening concerning for a sending ureteritis and cystitis. Patient initially refused felix catheter but then agreeable after decompensation and transfer to the ICU 10/30. Repeat blood cultures show no growth, Ucx mixed contaminants. Decompensation likely due to lack of source control in setting of persistent hydronephrosis and retained Port-A-Cath rather than more resistant infection. Now status post right PCN placement today with purulent fluid drained              -Patient received IV cefepime and vancomycin today. Recommend discontinuing these antibiotics   -Tomorrow, restart IV cefazolin 1 g every 24 hours, renally dose adjusted              -Follow-up repeat blood cultures, port catheter tip culture, PCN cultures              -Urology follow-up   -follow up renal U/S, consider repeat CT A/P pending clinical course     2. MSSA bacteremia. Due to #1 above. While Staphylococcus aureus isolated in the urine is likely due to seeding from a bloodstream infection, in this case a UTI seems to be the primary cause of his bacteremia. Risk factors for isolation of staph in the urine include prior urinary tract manipulation and his chronic stent. He has no open skin wounds or signs of SSTI. The patient does have a Port-A-Cath in place which has not been used for medication infusion outpatient.   TTE no vegetations              -antibiotics as above   -follow up repeat blood cultures -Anticipate a 4-week total course of antibiotics from blood culture clearance for this issue     3. Sepsis, evolving since admission with fever and tachycardia, now hypotension 10/30 requiring pressors. Due to #1 and 2 above. Also consider contribution of malignancy due to worsening pulmonary metastatic disease. Suspect decompensation is more due to a lack of source control in the setting of retained Port-A-Cath and persistent right hydronephrosis (patient declined Mcallister catheter earlier in the admission) rather than new or drug-resistant infection. Status post right PCN placement today with infected appearing fluid drained              -Antibiotics as above              -Follow-up blood cultures              -Monitor fever curve, white blood cell count     4. 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 Likely secondary to ATN and hydronephrosis. Prior baseline creatinine 3.6 but elevated to 4.25 on admission. Creatinine now worsening due to recurrent fever and hypotension              -Nephrology follow-up ongoing              -Dose adjust antibiotics for creatinine clearance     5. Advanced bladder cancer status post TURBT May 3306 complicated by right ureteral obstruction status post right internal ureteral stent. The patient has metastatic disease to the lungs. He previously underwent radiation and chemotherapy but is no longer on cancer directed therapy.              -Urology follow-up              -Palliative care follow-up outpatient     6. History of liver transplant in 2002. Due to alcohol abuse and hepatitis C. Patient currently on tacrolimus.              -Follow-up tacrolimus level     7. Type 2 diabetes mellitus with hyperglycemia. Recent hemoglobin A1c 7.3%. This is a risk factor for infection. Glycemic control per primary. 8.  Thrombocytopenia, chronic. Monitor platelets     I have discussed the above management plan in detail with the primary service, patient and daughter at bedside.  ID will follow. Antibiotics:  Abx day 9  Vancomycin/Cefepime    Subjective: The patient underwent right PCN placement today. He is feeling okay today other than persistent cough. He also has a new right subconjunctival hemorrhage. Denies fever, chills, abdominal pain. Now off pressors. Objective:  Vitals:  Temp:  [95.7 °F (35.4 °C)-97.9 °F (36.6 °C)] 97.4 °F (36.3 °C)  HR:  [72-94] 72  Resp:  [13-30] 25  BP: ()/(55-70) 107/60  SpO2:  [96 %-100 %] 96 %  Temp (24hrs), Av.1 °F (36.2 °C), Min:95.7 °F (35.4 °C), Max:97.9 °F (36.6 °C)  Current: Temperature: (!) 97.4 °F (36.3 °C)    Physical Exam:   General Appearance:  Frail appearing but nontoxic, no acute distress. Throat: Oropharynx moist without lesions. Lungs:   Clear to auscultation bilaterally; no wheezes, rhonchi or rales; respirations unlabored   Heart:  RRR; no murmur, rub or gallop   Abdomen:   Soft, non-tender, non-distended, positive bowel sounds. Right flank PCN with cloudy/bloody fluid   Extremities: No clubbing, cyanosis or edema   Skin: Left IJ central venous catheter       Labs:    All pertinent labs and imaging studies were personally reviewed  Results from last 7 days   Lab Units 10/31/23  1548 10/31/23  0640 10/31/23  0456 10/30/23  1414   WBC Thousand/uL 15.04*  --  14.80* 20.64*   HEMOGLOBIN g/dL 8.5* 6.3* 6.9* 7.4*   PLATELETS Thousands/uL 67*  --  66* 87*       Results from last 7 days   Lab Units 10/31/23  0456 10/30/23  1922 10/30/23  1102   SODIUM mmol/L 132* 132* 136   POTASSIUM mmol/L 4.6 5.1 3.1*   CHLORIDE mmol/L 100 99 100   CO2 mmol/L 21 20* 22   BUN mg/dL 73* 70* 73*   CREATININE mg/dL 6.68* 6.24* 6.52*   CALCIUM mg/dL 7.3* 7.1* 7.2*       Results from last 7 days   Lab Units 10/31/23  0456 10/30/23  0410 10/29/23  0553 10/26/23  0154   PROCALCITONIN ng/ml 119.68* 49.65* 56.62* 4.11*           Results from last 7 days   Lab Units 10/25/23  0554   FERRITIN ng/mL 324*             Micro:  Results from last 7 days   Lab Units 10/30/23  1522 10/30/23  1507 10/30/23  1102 10/30/23  0445 10/28/23  0603 10/27/23  1620 10/26/23  0155   BLOOD CULTURE  Received in Microbiology Lab. Culture in Progress. Received in Microbiology Lab. Culture in Progress. --   --  No Growth at 72 hrs. No Growth at 72 hrs. No Growth at 72 hrs. No Growth After 5 Days. URINE CULTURE   --   --  10,000-19,000 cfu/ml  --   --   --   --    MRSA CULTURE ONLY   --   --   --  No Methicillin Resistant Staphlyococcus aureus (MRSA) isolated  --   --   --          Imaging:  I have personally reviewed pertinent imaging study reports and images in PACS. CT A/P-right renal atrophy and moderate right hydronephrosis with ureteral stent in place.   Mild diffuse right urothelial thickening and enhancement, thickening of the urinary bladder

## 2023-10-31 NOTE — NURSING NOTE
Midline consult received 10/30 for no vascular access. Pt has CKD IV and had IJ CVC placed yesterday.   Completing consult at this time

## 2023-10-31 NOTE — SEDATION DOCUMENTATION
Right nephrostomy tube placement successfully completed by Dr. Radha nSell without complications. Specimen collected per orders. Tolerated well. Report called to Hugo Irizarry. To transport to MICU via bed by CRNA and IR RN.

## 2023-10-31 NOTE — PROGRESS NOTES
Daily Progress Note - Critical Care   Hannah Roque 74 y.o. adult MRN: [id number]  [facility]: [id number]        ----------------------------------------------------------------------------------------  HPI/24hr events: 73 y/o M with a PMHx of alcoholic cirrhosis s/p liver transplant in 2003 on tacro, metastatic bladder cancer s/p TURBT with metastasis to the lungs c/w right ureteral obstruction s/p stent, CVA CKD IV, T2DM who was admitted to the hospital on 10/23/23 for multiple falls. Found to have MSSA bactermia likely d/t port-a-cath and UTI in the presence of urinary stent. Patient was being treated with Ancef. On 10/30/23, pat was a RRT for fever and hypotension. He was transferred to the MICU for pressor support. Course has been c/w TAVON on CKD IV.     ---------------------------------------------------------------------------------------  SUBJECTIVE  No complaints. Improved cough. Denies any abdominal pain. Review of Systems  Review of systems was reviewed and negative unless stated above in HPI/24-hour events   ---------------------------------------------------------------------------------------  Assessment and Plan:  Neuro:   Diagnosis: no active issues     CV:   Diagnosis: septic/distributive shock. Found to have  MSSA bacteremia on presentation with urine cultures + alpha hemolytic strep. Suspected seeding from blood in the setting of port-a-cath. Previously being treated with ancef. Now with septic shock. Suspect pyelonephritis in the setting of bladder cancer with chronic right pyelonephritis s/p stent. Plan: broaden abx to vancomycin and cefepime; can consider to narrow based on repeat blood and urine cultures.     Off levo  Wean pressors goal MAP >65  Diagnosis: hx of CVA  C/w lipitor and plavix     Pulm:  Diagnosis: no active issues     GI:   Diagnosis: GERD  Plan: pantoprazole 40 mg qd, carafate  Diagnosis: hx of alcoholic cirrhosis s/p liver transplant in 2003 on tacrolimus  Plan: check tacro level      :   Diagnosis: TAVON on CKD IV due to ATN from hypotension and post renal obstruction in the setting of chronic right hydronephrosis from bladder cancer s/p stent with atrophic right kidney. 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 After long discussion the patient and his wife decided not to proceed with any additional gemcitabine finished radiation therapy on 8/25/2022        Endo:   Diagnosis: hx of T2DM  Plan: d/c Lantus 10 units due to below goal Bgs; continue SSI        ID:   Septic/distributive shock. Found to have  MSSA bacteremia on presentation with urine cultures + alpha hemolytic strep. Suspected seeding from blood in the setting of port-a-cath. Previously being treated with ancef. Now with septic shock. Suspect ? pyelonephritis in the setting of bladder cancer with chronic right pyelonephritis s/p stent. Plan: broaden abx to vancomycin and cefepime.    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   Plan: continue lyrica, nortriptyline      Disposition: Continue Critical Care   Code Status: Level 3 - DNAR and DNI  ---------------------------------------------------------------------------------------  ICU CORE MEASURES    Prophylaxis   VTE Pharmacologic Prophylaxis: Heparin  VTE Mechanical Prophylaxis: sequential compression device  Stress Ulcer Prophylaxis: Pantoprazole PO    Invasive Devices Review  Invasive Devices       Central Venous Catheter Line  Duration             CVC Central Lines 10/30/23 <1 day              Peripheral Intravenous Line  Duration             Peripheral IV 10/30/23 Left;Upper;Ventral (anterior) Arm <1 day              Drain  Duration             Urethral Catheter Temperature probe 16 Fr. <1 day                  Can any invasive devices be discontinued today?  Not applicable  ---------------------------------------------------------------------------------------  OBJECTIVE    Vitals   Vitals:    10/31/23 0300 10/31/23 0400 10/31/23 0500 10/31/23 0600   BP: 91/57 (!) 83/56 95/58 108/59   Pulse: 74 72 74 80   Resp: 14  (!) 23   Temp: 97.5 °F (36.4 °C)  97.8 °F (36.6 °C)    TempSrc: Axillary  Axillary    SpO2: 100% 100% 100% 100%   Weight:    51.3 kg (113 lb 1.5 oz)   Height:         Temp (24hrs), Av.8 °F (36.6 °C), Min:96.4 °F (35.8 °C), Max:99.5 °F (37.5 °C)  Current: Temperature: 97.8 °F (36.6 °C)      Respiratory:  SpO2: SpO2: 100 %, SpO2 Activity: SpO2 Activity: At Rest  Nasal Cannula O2 Flow Rate (L/min): 2 L/min    Invasive/non-invasive ventilation settings   Respiratory      Lab Data (Last 4 hours)      None           O2/Vent Data (Last 4 hours)      None                    Physical Exam    Laboratory and Diagnostics:  Results from last 7 days   Lab Units 10/31/23  0456 10/30/23  1414 10/30/23  0900 10/30/23  0407 10/29/23  0553 10/27/23  0526 10/26/23  0636 10/26/23  0154   WBC Thousand/uL 14.80* 20.64*  --  2.62* 8.79 6.99 5.54 6.08   HEMOGLOBIN g/dL 6.9* 7.4*  --  9.4* 7.2* 8.9* 8.1* 8.5*   HEMATOCRIT % 20.9* 21.4*  --  29.6* 22.9* 26.2* 24.0* 26.0*   PLATELETS Thousands/uL 66* 87* 77* 113* 101* 94* 75* 82*   NEUTROS PCT %  --   --   --   --   --   --  77* 80*   MONOS PCT %  --   --   --   --   --   --  9 8   EOS PCT %  --   --   --   --   --   --  2 1     Results from last 7 days   Lab Units 10/31/23  0456 10/30/23  1922 10/30/23  1102 10/30/23  0407 10/29/23  0553 10/28/23  0654 10/27/23  0526   SODIUM mmol/L 132* 132* 136 132* 134* 134* 135   POTASSIUM mmol/L 4.6 5.1 3.1* 4.0 4.0 3.7 3.7   CHLORIDE mmol/L 100 99 100 97 101 103 103   CO2 mmol/L 21 20* 22 20* 21 22 21   ANION GAP mmol/L 11 13 14 15 12 9 11   BUN mg/dL 73* 70* 73* 72* 57* 50* 47*   CREATININE mg/dL 6.68* 6.24* 6.52* 6.40* 5.44* 4.67* 4.66*   CALCIUM mg/dL 7.3* 7.1* 7.2* 7.9* 7.4* 7.4* 7.7*   GLUCOSE RANDOM mg/dL 116 152* 100 163* 126 110 116     Results from last 7 days   Lab Units 10/31/23  0456 10/30/23  1102 10/27/23  0526   MAGNESIUM mg/dL 2.0 2.0 1.4*   PHOSPHORUS mg/dL 4.0  --  3.0      Results from last 7 days   Lab Units 10/30/23  0900   INR  1.27*          Results from last 7 days   Lab Units 10/30/23  1414 10/30/23  1102 10/30/23  0410   LACTIC ACID mmol/L 1.1 2.7* 1.6     ABG:    VBG:    Results from last 7 days   Lab Units 10/30/23  0410 10/29/23  0553 10/26/23  0154   PROCALCITONIN ng/ml 49.65* 56.62* 4.11*       Micro  Results from last 7 days   Lab Units 10/30/23  1522 10/30/23  1507 10/28/23  0603 10/27/23  1620 10/26/23  0155   BLOOD CULTURE  Received in Microbiology Lab. Culture in Progress. Received in Microbiology Lab. Culture in Progress. No Growth at 48 hrs. No Growth at 48 hrs. No Growth at 72 hrs. No Growth After 4 Days. Imaging:  I have personally reviewed pertinent reports. and I have personally reviewed pertinent films in PACS    Intake and Output  I/O         10/29 0701  10/30 0700 10/30 0701  10/31 0700 10/31 0701  11/01 0700    P. O. 240 180     I.V. (mL/kg) 800 (12.8) 1810.8 (35.3)     IV Piggyback  350     Total Intake(mL/kg) 1040 (16.7) 2340.8 (45.6)     Urine (mL/kg/hr) 200 (0.1) 245 (0.2)     Stool  0     Total Output 200 245     Net +840 +2095.8            Unmeasured Stool Occurrence  2 x           UOP: 230 ml/hr     Height and Weights   Height: 5' 3" (160 cm)     Body mass index is 20.03 kg/m². Weight (last 2 days)       Date/Time Weight    10/31/23 0600 51.3 (113.1)    10/30/23 0600 62.3 (137.35)    10/29/23 0558 61.2 (134.92)              Nutrition       Diet Orders   (From admission, onward)                 Start     Ordered    10/30/23 1251  Diet Renal; Lo Phosphorus; No; No; Lo Phosphorus  Diet effective now        References:    Adult Nutrition Support Algorithm    RD Therapeutic Diet Order Protocol   Question Answer Comment   Diet Type Renal    Renal Lo Phosphorus    Should patient have a fluid restriction? No    Should patient have a protein modifier? No    Other Restriction(s): Lo Phosphorus    RD to adjust diet per protocol?  Yes        10/30/23 1251                    Active Medications  Scheduled Meds:  Current Facility-Administered Medications   Medication Dose Route Frequency Provider Last Rate    acetaminophen  975 mg Oral Q6H PRN Gaby Maxin, PA-C      atorvastatin  80 mg Oral Daily With [employer], DO      cefepime  1,000 mg Intravenous Q24H Aimee Lazaro, DO Stopped (10/30/23 1139)    chlorhexidine  15 mL Mouth/Throat Q12H [address] [facility], DO      clopidogrel  75 mg Oral Daily Harpandakmario Verdugo, DO      dextromethorphan-guaiFENesin  10 mL Oral Q4H PRN Gaby Maxin, PA-C      docusate sodium  100 mg Oral BID Kvng Thapa MD      heparin (porcine)  5,000 Units Subcutaneous Q8H [address] [facility], DO      insulin lispro  1-5 Units Subcutaneous TID AC Cele Verdugo, DO      insulin lispro  3 Units Subcutaneous TID With Meals Kvng Thapa MD      meclizine  25 mg Oral Q8H PRN Kasie Fossa, DO      norepinephrine  1-30 mcg/min Intravenous Titrated Sabi Arch Darcyorti, DO Stopped (10/31/23 4895)    nortriptyline  10 mg Oral HS Harkaushal Verdugo, DO      ondansetron  4 mg Intravenous Q4H PRN Gaby Maxin, PA-C      oxybutynin  5 mg Oral BID Cele Verdugo, DO      oxyCODONE  2.5 mg Oral Q4H PRN Mere Samson MD      pantoprazole  40 mg Oral Daily Cele Verdugo, DO      pregabalin  75 mg Oral BID Sharif Felipe, DO      senna  2 tablet Oral Once Mere Samson MD      sodium bicarbonate  650 mg Oral TID after meals Glorious David Penalozanadine, DO      sucralfate  1 g Oral 4x Daily (AC & HS) Cele Verdugo, DO      tacrolimus  1 mg Oral Q12H [address] Cele Verdugo, DO      temazepam  15 mg Oral HS PRN Sharif Felipe, DO      vancomycin  15 mg/kg Intravenous Daily PRN Kayy Wright MD      vancomycin  15 mg/kg Intravenous Once Kayy Wright MD      zolpidem  5 mg Oral HS PRN Cele Verdugo, DO       Continuous Infusions:  norepinephrine, 1-30 mcg/min, Last Rate: Stopped (10/31/23 0612)      PRN Meds:   acetaminophen, 975 mg, Q6H PRN  dextromethorphan-guaiFENesin, 10 mL, Q4H PRN  meclizine, 25 mg, Q8H PRN  ondansetron, 4 mg, Q4H PRN  oxyCODONE, 2.5 mg, Q4H PRN  temazepam, 15 mg, HS PRN  vancomycin, 15 mg/kg, Daily PRN  zolpidem, 5 mg, HS PRN        Allergies   No Known Allergies  ---------------------------------------------------------------------------------------  Advance Directive and Living Will:      Power of :    POLST:    ---------------------------------------------------------------------------------------  Care Time Delivered:   No Critical Care time spent     [facility], DO      Portions of the record may have been created with voice recognition software. Occasional wrong word or "sound a like" substitutions may have occurred due to the inherent limitations of voice recognition software.   Read the chart carefully and recognize, using context, where substitutions have occurred

## 2023-10-31 NOTE — ANESTHESIA PREPROCEDURE EVALUATION
Procedure:  IR NEPHROSTOMY TUBE PLACEMENT    Relevant Problems   ENDO   (+) T2DM (type 2 diabetes mellitus) (HCC)      /RENAL   (+) Acute on chronic renal failure    (+) CKD (chronic kidney disease)      HEMATOLOGY   (+) Anemia   (+) Thrombocytopenia (HCC)      NEURO/PSYCH   (+) Dementia (HCC)      Respiratory   (+) Bladder cancer 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 (HCC)      Other   (+) History of liver transplant [facility])        Physical Exam    Airway    Mallampati score: I  TM Distance: >3 FB  Neck ROM: full     Dental    upper dentures and lower dentures    Cardiovascular  Cardiovascular exam normal    Pulmonary  Pulmonary exam normal     Other Findings        Anesthesia Plan  ASA Score- 3     Anesthesia Type- general with ASA Monitors. Additional Monitors:     Airway Plan: ETT. Comment: Discussed perioperative DNR order with patient. He was amenable to full code status and intubation for procedure. .       Plan Factors-    Chart reviewed. Existing labs reviewed. Patient summary reviewed. Induction- intravenous. Postoperative Plan-     Informed Consent- Anesthetic plan and risks discussed with patient. I personally reviewed this patient with the CRNA. Discussed and agreed on the Anesthesia Plan with the CRNA. Darylene Pipe

## 2023-10-31 NOTE — OCCUPATIONAL THERAPY NOTE
Occupational Therapy         Patient Name: Serafin Spurling  [id number] Date: 10/31/2023       10/31/23 1500   OT Last Visit   OT Visit Date 10/31/23   Note Type   Note Type Cancelled Session   Cancel Reasons Other  (pt receiving blood this am - off floor in pm - will defer)       [employer]

## 2023-11-01 ENCOUNTER — APPOINTMENT (INPATIENT)
Dept: DIALYSIS | Facility: HOSPITAL | Age: 73
DRG: 871 | End: 2023-11-01
Payer: COMMERCIAL

## 2023-11-01 ENCOUNTER — APPOINTMENT (INPATIENT)
Dept: RADIOLOGY | Facility: HOSPITAL | Age: 73
DRG: 871 | End: 2023-11-01
Payer: COMMERCIAL

## 2023-11-01 LAB
ABO GROUP BLD BPU: NORMAL
ANION GAP SERPL CALCULATED.3IONS-SCNC: 11 MMOL/L
ANION GAP SERPL CALCULATED.3IONS-SCNC: 13 MMOL/L
ANION GAP SERPL CALCULATED.3IONS-SCNC: 13 MMOL/L
BACTERIA BLD CULT: NORMAL
BPU ID: NORMAL
BUN SERPL-MCNC: 60 MG/DL (ref 5–25)
BUN SERPL-MCNC: 88 MG/DL (ref 5–25)
BUN SERPL-MCNC: 93 MG/DL (ref 5–25)
CALCIUM SERPL-MCNC: 7.5 MG/DL (ref 8.4–10.2)
CALCIUM SERPL-MCNC: 7.8 MG/DL (ref 8.4–10.2)
CALCIUM SERPL-MCNC: 8 MG/DL (ref 8.4–10.2)
CHLORIDE SERPL-SCNC: 94 MMOL/L (ref 96–108)
CHLORIDE SERPL-SCNC: 96 MMOL/L (ref 96–108)
CHLORIDE SERPL-SCNC: 96 MMOL/L (ref 96–108)
CO2 SERPL-SCNC: 18 MMOL/L (ref 21–32)
CO2 SERPL-SCNC: 18 MMOL/L (ref 21–32)
CO2 SERPL-SCNC: 22 MMOL/L (ref 21–32)
CREAT SERPL-MCNC: 5.2 MG/DL (ref 0.6–1.3)
CREAT SERPL-MCNC: 7.51 MG/DL (ref 0.6–1.3)
CREAT SERPL-MCNC: 7.55 MG/DL (ref 0.6–1.3)
CROSSMATCH: NORMAL
ERYTHROCYTE [DISTWIDTH] IN BLOOD BY AUTOMATED COUNT: 16.4 % (ref 11.6–15.1)
GLUCOSE SERPL-MCNC: 153 MG/DL (ref 65–140)
GLUCOSE SERPL-MCNC: 158 MG/DL (ref 65–140)
GLUCOSE SERPL-MCNC: 159 MG/DL (ref 65–140)
GLUCOSE SERPL-MCNC: 163 MG/DL (ref 65–140)
GLUCOSE SERPL-MCNC: 197 MG/DL (ref 65–140)
GLUCOSE SERPL-MCNC: 216 MG/DL (ref 65–140)
HBV CORE AB SER QL: REACTIVE
HBV CORE IGM SER QL: ABNORMAL
HBV SURFACE AB SER-ACNC: <3 MIU/ML
HBV SURFACE AG SER QL: ABNORMAL
HCT VFR BLD AUTO: 28.8 % (ref 36.5–46.1)
HCV AB SER QL: REACTIVE
HGB BLD-MCNC: 9.5 G/DL (ref 12–15.4)
MAGNESIUM SERPL-MCNC: 2.1 MG/DL (ref 1.9–2.7)
MCH RBC QN AUTO: 25.5 PG (ref 26.8–34.3)
MCHC RBC AUTO-ENTMCNC: 33 G/DL (ref 31.4–37.4)
MCV RBC AUTO: 77 FL (ref 82–98)
NRBC BLD AUTO-RTO: 0 /100 WBCS
PHOSPHATE SERPL-MCNC: 6.4 MG/DL (ref 2.3–4.1)
PLATELET # BLD AUTO: 67 THOUSANDS/UL (ref 149–390)
POTASSIUM SERPL-SCNC: 4.2 MMOL/L (ref 3.5–5.3)
POTASSIUM SERPL-SCNC: 5.7 MMOL/L (ref 3.5–5.3)
POTASSIUM SERPL-SCNC: 6 MMOL/L (ref 3.5–5.3)
RBC # BLD AUTO: 3.72 MILLION/UL (ref 3.88–5.12)
SODIUM SERPL-SCNC: 125 MMOL/L (ref 135–147)
SODIUM SERPL-SCNC: 127 MMOL/L (ref 135–147)
SODIUM SERPL-SCNC: 129 MMOL/L (ref 135–147)
UNIT DISPENSE STATUS: NORMAL
UNIT PRODUCT CODE: NORMAL
UNIT PRODUCT VOLUME: 350 ML
UNIT RH: NORMAL
WBC # BLD AUTO: 17.62 THOUSAND/UL (ref 4.31–10.16)

## 2023-11-01 PROCEDURE — 83735 ASSAY OF MAGNESIUM: CPT

## 2023-11-01 PROCEDURE — 84100 ASSAY OF PHOSPHORUS: CPT

## 2023-11-01 PROCEDURE — 99232 SBSQ HOSP IP/OBS MODERATE 35: CPT | Performed by: NURSE PRACTITIONER

## 2023-11-01 PROCEDURE — 82948 REAGENT STRIP/BLOOD GLUCOSE: CPT

## 2023-11-01 PROCEDURE — NC001 PR NO CHARGE: Performed by: STUDENT IN AN ORGANIZED HEALTH CARE EDUCATION/TRAINING PROGRAM

## 2023-11-01 PROCEDURE — 71045 X-RAY EXAM CHEST 1 VIEW: CPT

## 2023-11-01 PROCEDURE — 02HV33Z INSERTION OF INFUSION DEVICE INTO SUPERIOR VENA CAVA, PERCUTANEOUS APPROACH: ICD-10-PCS | Performed by: STUDENT IN AN ORGANIZED HEALTH CARE EDUCATION/TRAINING PROGRAM

## 2023-11-01 PROCEDURE — 86704 HEP B CORE ANTIBODY TOTAL: CPT | Performed by: INTERNAL MEDICINE

## 2023-11-01 PROCEDURE — 5A1D70Z PERFORMANCE OF URINARY FILTRATION, INTERMITTENT, LESS THAN 6 HOURS PER DAY: ICD-10-PCS | Performed by: STUDENT IN AN ORGANIZED HEALTH CARE EDUCATION/TRAINING PROGRAM

## 2023-11-01 PROCEDURE — 97535 SELF CARE MNGMENT TRAINING: CPT

## 2023-11-01 PROCEDURE — 99232 SBSQ HOSP IP/OBS MODERATE 35: CPT | Performed by: STUDENT IN AN ORGANIZED HEALTH CARE EDUCATION/TRAINING PROGRAM

## 2023-11-01 PROCEDURE — 76775 US EXAM ABDO BACK WALL LIM: CPT

## 2023-11-01 PROCEDURE — 86706 HEP B SURFACE ANTIBODY: CPT | Performed by: INTERNAL MEDICINE

## 2023-11-01 PROCEDURE — 86803 HEPATITIS C AB TEST: CPT | Performed by: INTERNAL MEDICINE

## 2023-11-01 PROCEDURE — 97530 THERAPEUTIC ACTIVITIES: CPT

## 2023-11-01 PROCEDURE — 80048 BASIC METABOLIC PNL TOTAL CA: CPT

## 2023-11-01 PROCEDURE — NC001 PR NO CHARGE: Performed by: INTERNAL MEDICINE

## 2023-11-01 PROCEDURE — 87340 HEPATITIS B SURFACE AG IA: CPT | Performed by: INTERNAL MEDICINE

## 2023-11-01 PROCEDURE — 86705 HEP B CORE ANTIBODY IGM: CPT | Performed by: INTERNAL MEDICINE

## 2023-11-01 PROCEDURE — 36556 INSERT NON-TUNNEL CV CATH: CPT | Performed by: STUDENT IN AN ORGANIZED HEALTH CARE EDUCATION/TRAINING PROGRAM

## 2023-11-01 PROCEDURE — 90935 HEMODIALYSIS ONE EVALUATION: CPT | Performed by: INTERNAL MEDICINE

## 2023-11-01 PROCEDURE — 99233 SBSQ HOSP IP/OBS HIGH 50: CPT | Performed by: INTERNAL MEDICINE

## 2023-11-01 PROCEDURE — 85027 COMPLETE CBC AUTOMATED: CPT

## 2023-11-01 RX ORDER — BUMETANIDE 0.25 MG/ML
4 INJECTION INTRAMUSCULAR; INTRAVENOUS ONCE
Status: COMPLETED | OUTPATIENT
Start: 2023-11-01 | End: 2023-11-01

## 2023-11-01 RX ORDER — HYDROMORPHONE HCL/PF 1 MG/ML
0.5 SYRINGE (ML) INJECTION ONCE
Status: COMPLETED | OUTPATIENT
Start: 2023-11-01 | End: 2023-11-01

## 2023-11-01 RX ORDER — SODIUM BICARBONATE 650 MG/1
1300 TABLET ORAL
Status: DISCONTINUED | OUTPATIENT
Start: 2023-11-01 | End: 2023-11-02

## 2023-11-01 RX ORDER — CEFAZOLIN SODIUM 1 G/50ML
1000 SOLUTION INTRAVENOUS EVERY 12 HOURS
Status: DISCONTINUED | OUTPATIENT
Start: 2023-11-01 | End: 2023-11-01

## 2023-11-01 RX ORDER — DEXTROSE MONOHYDRATE 25 G/50ML
25 INJECTION, SOLUTION INTRAVENOUS ONCE
Status: COMPLETED | OUTPATIENT
Start: 2023-11-01 | End: 2023-11-01

## 2023-11-01 RX ORDER — CALCIUM GLUCONATE 20 MG/ML
1 INJECTION, SOLUTION INTRAVENOUS ONCE
Status: COMPLETED | OUTPATIENT
Start: 2023-11-01 | End: 2023-11-01

## 2023-11-01 RX ORDER — TACROLIMUS 1 MG/1
1 CAPSULE ORAL EVERY 12 HOURS SCHEDULED
Status: DISCONTINUED | OUTPATIENT
Start: 2023-11-02 | End: 2023-11-03 | Stop reason: HOSPADM

## 2023-11-01 RX ORDER — NOREPINEPHRINE BITARTRATE 1 MG/ML
INJECTION, SOLUTION INTRAVENOUS
Status: COMPLETED
Start: 2023-11-01 | End: 2023-11-01

## 2023-11-01 RX ADMIN — HEPARIN SODIUM 5000 UNITS: 5000 INJECTION INTRAVENOUS; SUBCUTANEOUS at 05:49

## 2023-11-01 RX ADMIN — INSULIN HUMAN 10 UNITS: 100 INJECTION, SOLUTION PARENTERAL at 07:51

## 2023-11-01 RX ADMIN — PREGABALIN 75 MG: 50 CAPSULE ORAL at 17:12

## 2023-11-01 RX ADMIN — NORTRIPTYLINE HYDROCHLORIDE 10 MG: 10 CAPSULE ORAL at 22:23

## 2023-11-01 RX ADMIN — CALCIUM GLUCONATE 1 G: 20 INJECTION, SOLUTION INTRAVENOUS at 07:24

## 2023-11-01 RX ADMIN — CLOPIDOGREL BISULFATE 75 MG: 75 TABLET ORAL at 08:48

## 2023-11-01 RX ADMIN — SODIUM BICARBONATE 650 MG TABLET 650 MG: at 12:12

## 2023-11-01 RX ADMIN — SUCRALFATE 1 G: 1 TABLET ORAL at 12:12

## 2023-11-01 RX ADMIN — SUCRALFATE 1 G: 1 TABLET ORAL at 07:51

## 2023-11-01 RX ADMIN — HEPARIN SODIUM 5000 UNITS: 5000 INJECTION INTRAVENOUS; SUBCUTANEOUS at 17:13

## 2023-11-01 RX ADMIN — PANTOPRAZOLE SODIUM 40 MG: 40 TABLET, DELAYED RELEASE ORAL at 08:48

## 2023-11-01 RX ADMIN — CHLORHEXIDINE GLUCONATE 15 ML: 1.2 SOLUTION ORAL at 22:21

## 2023-11-01 RX ADMIN — Medication 2.5 MG: at 17:23

## 2023-11-01 RX ADMIN — DEXTROSE MONOHYDRATE 25 ML: 25 INJECTION, SOLUTION INTRAVENOUS at 07:21

## 2023-11-01 RX ADMIN — OXYBUTYNIN CHLORIDE 5 MG: 5 TABLET ORAL at 17:12

## 2023-11-01 RX ADMIN — NOREPINEPHRINE BITARTRATE 4 MG: 1 INJECTION INTRAVENOUS at 14:54

## 2023-11-01 RX ADMIN — CHLORHEXIDINE GLUCONATE 15 ML: 1.2 SOLUTION ORAL at 08:48

## 2023-11-01 RX ADMIN — CEFEPIME 1000 MG: 1 INJECTION, POWDER, FOR SOLUTION INTRAMUSCULAR; INTRAVENOUS at 10:28

## 2023-11-01 RX ADMIN — SODIUM BICARBONATE 650 MG TABLET 650 MG: at 07:51

## 2023-11-01 RX ADMIN — OXYBUTYNIN CHLORIDE 5 MG: 5 TABLET ORAL at 08:48

## 2023-11-01 RX ADMIN — PREGABALIN 75 MG: 50 CAPSULE ORAL at 08:49

## 2023-11-01 RX ADMIN — DOCUSATE SODIUM 100 MG: 100 CAPSULE ORAL at 08:49

## 2023-11-01 RX ADMIN — INSULIN LISPRO 1 UNITS: 100 INJECTION, SOLUTION INTRAVENOUS; SUBCUTANEOUS at 17:19

## 2023-11-01 RX ADMIN — TACROLIMUS 1 MG: 1 CAPSULE ORAL at 08:48

## 2023-11-01 RX ADMIN — HEPARIN SODIUM 5000 UNITS: 5000 INJECTION INTRAVENOUS; SUBCUTANEOUS at 22:24

## 2023-11-01 RX ADMIN — ATORVASTATIN CALCIUM 80 MG: 80 TABLET, FILM COATED ORAL at 16:30

## 2023-11-01 RX ADMIN — SODIUM BICARBONATE 650 MG TABLET 1300 MG: at 17:21

## 2023-11-01 RX ADMIN — HYDROMORPHONE HYDROCHLORIDE 0.5 MG: 1 INJECTION, SOLUTION INTRAMUSCULAR; INTRAVENOUS; SUBCUTANEOUS at 00:13

## 2023-11-01 RX ADMIN — SUCRALFATE 1 G: 1 TABLET ORAL at 16:30

## 2023-11-01 RX ADMIN — BUMETANIDE 4 MG: 0.25 INJECTION INTRAMUSCULAR; INTRAVENOUS at 07:39

## 2023-11-01 RX ADMIN — SUCRALFATE 1 G: 1 TABLET ORAL at 22:16

## 2023-11-01 RX ADMIN — INSULIN LISPRO 1 UNITS: 100 INJECTION, SOLUTION INTRAVENOUS; SUBCUTANEOUS at 12:10

## 2023-11-01 RX ADMIN — INSULIN LISPRO 1 UNITS: 100 INJECTION, SOLUTION INTRAVENOUS; SUBCUTANEOUS at 07:52

## 2023-11-01 NOTE — PROCEDURES
HEMODIALYSIS PROCEDURE NOTE  The patient was seen and examined on hemodialysis; 1st treatment. Lines are reversed.   Tolerating well and BP stable 110/67  Time: 2 hours  Sodium: 138 Blood flow: 250   Dialyzer: F160 Potassium: 2k with 1 k bath last hour Dialysate flow: 1.5   Access: right temp HD cath Bicarbonate: 35 Ultrafiltration goal: even   Medications on HD: NA

## 2023-11-01 NOTE — PROGRESS NOTES
Transfer Note - Critical Care   Fili Sanchez 74 y.o. adult MRN: [id number]  Unit/Bed#: MICU 09 Encounter: [id number]    Code Status: Level 3 - DNAR and DNI  POA:    POLST:      Reason for ICU admission:   Septic shock     Active problems:   Principal Problem:    Fall  Active Problems:    Bladder cancer 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 ([address])    Insomnia    History of liver transplant ([address])    T2DM (type 2 diabetes mellitus) ([address])    Acute on chronic renal failure     UTI (urinary tract infection)    Dementia ([address])    CKD (chronic kidney disease)    Thrombocytopenia (HCC)    History of CVA (cerebrovascular accident)    Anemia    MSSA bacteremia    Septic shock ([address])  Resolved Problems:    * No resolved hospital problems. *      Consultants:   ID, urology, nephrology    History of Present Illness & hospital course:   73 y/o M with a PMHx of alcoholic cirrhosis s/p liver transplant in 2003 on tacro, metastatic bladder cancer s/p TURBT with metastasis to the lungs c/w right ureteral obstruction s/p stent, CVA CKD IV, T2DM who was admitted to the hospital on 10/23/23 for multiple falls. Found to have MSSA bactermia likely d/t port-a-cath and UTI in the presence of urinary stent. Patient was being treated with Ancef. On 10/30/23, pt was a RRT for fever and hypotension. He was transferred to the MICU for pressor support. Course has been c/w TAVON on CKD IV. He was started on cefepime/vanc and underwent PCN drain placement on 10/31/23 for right hydronephrosis due to chronic obstruction from bladder cancer. Blood cultures pending,PCN cultures pending (gram stain + for GNR and GPC). Started HD on 11/1/23.       Recent or scheduled procedures:   PCN 10/31/23      Outstanding/pending diagnostics:   Blood cultures, sputum cx, urine cx, body fluid cx         Mobilization Plan:   PT/OT    Nutrition Plan:       VTE Pharmacologic Prophylaxis: Heparin  VTE Mechanical Prophylaxis: sequential compression device      Spoke with Dr. Shadi Chin regarding transfer. Please call Critical Care with any questions or concerns. Portions of the record may have been created with voice recognition software. Occasional wrong word or "sound a like" substitutions may have occurred due to the inherent limitations of voice recognition software. Read the chart carefully and recognize, using context, where substitutions have occurred.

## 2023-11-01 NOTE — PROGRESS NOTES
Progress Note -Interventional Radiology   Fili Sanchez 74 y.o. adult MRN: [id number]  Unit/Bed#: MICU 09 Encounter: [id number]    Assessment/Plan:  73 yo male with CKD stage 5, DM type 2,  h/o liver transplant (2003 on tacrolimus, stage IV bladder cancer s/p TURBT with metastasis to lungs, c/b ureteral obstruction s/p stent placement who was admitted 10/23/23 after multiple falls, found to have MSSA bacteremia, right hydronephrosis, septic shock related to acute cystitis. Patient now s/p Right PCN placement by IR on 10/31/23. Patient slowly improving. Right PCN flushes without difficulty and no complaints of pain or leaking at PCN site. Currently receiving iHD, BP soft, restarted on low dose norepinephrine. Plan:  -flush right PCN with 10ml NSS q 8 hours while admitted, then daily upon discharge.  -continue to monitor output  -patient plan for 6 week right ureteral stent removal and routine PCN exchange. -Appreciate Urology and Nephrology recommendations  -remainder of care per Primary Team  -please contact IR with additional questions or concerns    Patient Active Problem List   Diagnosis    Fall    Bladder cancer 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 [facility])    Insomnia    History of liver transplant ([address])    T2DM (type 2 diabetes mellitus) ([address])    Acute on chronic renal failure     UTI (urinary tract infection)    Dementia ([address])    CKD (chronic kidney disease)    Thrombocytopenia ([address])    History of CVA (cerebrovascular accident)    Anemia    MSSA bacteremia    Septic shock ([address])        Subjective: Fili Sanchez is a 74 y.o. adult male with CKD stage 5, DM type 2,  h/o liver transplant (2003 on tacrolimus, stage IV bladder cancer s/p TURBT with metastasis to lungs, c/b ureteral obstruction s/p stent placement who was admitted 10/23/23 after multiple falls, found to have MSSA bacteremia, hydronephrosis, septic shock related to acute cystitis. Patient now s/p Right PCN placement by IR on 10/31/23.     Patient currently receiving iHD. He has some pain in right neck and face by catheter insertion, but no chest or flank pain noted. Objective:     Vitals:  /55   Pulse 92   Temp 97.5 °F (36.4 °C) (Oral)   Resp 17   Ht 5' 3" (1.6 m)   Wt 48.9 kg (107 lb 12.9 oz)   SpO2 96%   BMI 19.10 kg/m²   Body mass index is 19.1 kg/m². Weight (last 2 days)       Date/Time Weight    11/01/23 0600 48.9 (107.81)    10/31/23 1003 51.3 (113.1)    10/31/23 0600 51.3 (113.1)    10/30/23 0600 62.3 (137.35)            I/Os:    Intake/Output Summary (Last 24 hours) at 11/1/2023 1606  Last data filed at 11/1/2023 1445  Gross per 24 hour   Intake 1630 ml   Output 362 ml   Net 1268 ml       Invasive Devices       Peripheral Intravenous Line  Duration             Peripheral IV 10/30/23 Left;Upper;Ventral (anterior) Arm 2 days              Hemodialysis Catheter  Duration             HD Temporary Double Catheter 9 days              Drain  Duration             Urethral Catheter Temperature probe 16 Fr. 2 days    Nephrostomy Right 10.2 Fr. 1 day                    Physical Exam:  General appearance: acutely ill appearing, alert and cooperative. HEENT: Normocephalic,atraumatic, EOMI, Right IJ HD central line in place. Neurologic: alert. FRITZ  Pulmonary: no respiratory distress, breathing non-labored  Cardiovascular: normal rate and rhythm, generalized edema  GI: non-distended, soft. : Right flank PCN patent flow with pink tinged fluid draining. Dressing C/D/I. Right flank Non tender to palpation. Mcallister in place. Currently on iHD  Skin: warm, dry, RUE with erythema, edema.         Lab Results and Cultures:   CBC with diff:   Lab Results   Component Value Date    WBC 17.62 (H) 11/01/2023    HGB 9.5 (L) 11/01/2023    HCT 28.8 (L) 11/01/2023    MCV 77 (L) 11/01/2023    PLT 67 (L) 11/01/2023    RBC 3.72 (L) 11/01/2023    MCH 25.5 (L) 11/01/2023    MCHC 33.0 11/01/2023    RDW 16.4 (H) 11/01/2023    MPV 11.4 10/30/2023    NRBC 0 11/01/2023 BMP/CMP:  Lab Results   Component Value Date    K 5.7 (H) 11/01/2023    CL 94 (L) 11/01/2023    CO2 18 (L) 11/01/2023    BUN 93 (H) 11/01/2023    CREATININE 7.51 (H) 11/01/2023    CALCIUM 8.0 (L) 11/01/2023    AST 37 10/23/2023    ALT 16 10/23/2023    ALKPHOS 59 10/23/2023       Coags:   Lab Results   Component Value Date    INR 1.27 (H) 10/30/2023     Results from last 7 days   Lab Units 10/30/23  0900   INR  1.27*        Lipid Panel: No results found for: "CHOL"  No results found for: "HDL"  No results found for: "HDL"  No results found for: "LDLCALC"  No results found for: "TRIG"    HgbA1c: No results found for: "HGBA1C"    Blood Culture:   Lab Results   Component Value Date    BLOODCX No Growth at 24 hrs. 10/30/2023     Urinalysis:   Lab Results   Component Value Date    COLORU Light Orange 10/30/2023    CLARITYU Extra Turbid 10/30/2023    SPECGRAV 1.009 10/30/2023    PHUR 6.0 10/30/2023    LEUKOCYTESUR Large (A) 10/30/2023    NITRITE Negative 10/30/2023    GLUCOSEU 150 (3/20%) (A) 10/30/2023    KETONESU Negative 10/30/2023    BILIRUBINUR Negative 10/30/2023    BLOODU Large (A) 10/30/2023   ,   Urine Culture:   Lab Results   Component Value Date    URINECX 10,000-19,000 cfu/ml 10/30/2023     Wound Culure:  No results found for: "WOUNDCULT"    VTE Pharmacologic Prophylaxis: Sequential compression device (Venodyne)  and Heparin      Thank you for allowing me to participate in the care of Lenoard Pool. Please don't hesitate to call, text, email, or TigerText with any questions.

## 2023-11-01 NOTE — PLAN OF CARE
Patient presents for their first HD session of 2 hours on a 2K2.5Ca bath for the first hour and 1K2.5Ca bath for the last hour for a serum potassium of 6.0 mmol/L drawn today with a net UF goal to run even per discussion with Alfie Billingsley at bedside for the initiation of tx. Post-Dialysis RN Treatment Note    Blood Pressure:  Pre 119/74 mm/Hg  Post 139/67 mmHg   EDW  TBD kg    Weight:  Pre 48.9 kg   Post 49.6 kg   Mode of weight measurement: Bed Scale   Volume Removed  +688 ml    Treatment duration 120 minutes    NS given  No    Treatment shortened?  No   Medications given during Rx None Reported   Estimated Kt/V  Not Applicable   Access type: Temporary HD catheter   Access Issues: Yes, describe: Lines reversed     Report called to primary nurse   Yes, Nicola Fulton RN     Problem: METABOLIC, FLUID AND ELECTROLYTES - ADULT  Goal: Electrolytes maintained within normal limits  Description: INTERVENTIONS:  - Monitor labs and assess patient for signs and symptoms of electrolyte imbalances  - Administer electrolyte replacement as ordered  - Monitor response to electrolyte replacements, including repeat lab results as appropriate  - Instruct patient on fluid and nutrition as appropriate  Outcome: Progressing  Goal: Fluid balance maintained  Description: INTERVENTIONS:  - Monitor labs   - Monitor I/O and WT  - Instruct patient on fluid and nutrition as appropriate  - Assess for signs & symptoms of volume excess or deficit  Outcome: Progressing

## 2023-11-01 NOTE — PLAN OF CARE
Problem: OCCUPATIONAL THERAPY ADULT  Goal: Performs self-care activities at highest level of function for planned discharge setting. See evaluation for individualized goals. Note: Limitation: Decreased ADL status, Decreased Safe judgement during ADL, Decreased endurance, Decreased self-care trans, Decreased high-level ADLs     Assessment: Pt seen for skilled OT treatment session w focus on mobility, OOB, basic self care. Pt c/o feeling weak from procedure yesterday (nephrostomy tube placement). Pt required mod assist to get to EOB, sligh difficulty holding self up for long periods of time, min assist needed for postural control. Pt tended to lean forward. Mod assist needed for sit to stand. While up in stance, pt dependent for toileting hygiene. Mod assist of 1 for SPT to recliner chair. Once up in chair, pt able to complete grooming w S/set up, as well as UB bathing. Max assist needed for LB dressing. OT will continue to follow as per POC.      Rehab Resource Intensity Level, OT: II (Moderate Resource Intensity)

## 2023-11-01 NOTE — PROGRESS NOTES
Progress Note - Urology  Rah [facility] 1950, 74 y.o. adult MRN: [id number]    Unit/Bed#: MICU 09 Encounter: [id number]    UTI (urinary tract infection)  Assessment & Plan  MSSA bacteremia  Previous urine culture positive for alphahemolytic strep, repeat culture with mixed contaminants  Restarted on levofed due to hypotension today  Wbc 17.62  Empiric antibiotics tailored to culture  Medical optimization per primary team  S/p right PCN placement by IR 11/1    Acute on chronic renal failure   Assessment & Plan  Creat 7.51; has been rising this admit, baseline 2.5-3.6  Currently on hemodialysis  CT: Right renal atrophy and moderate right hydronephrosis with right double-J stent in place  Continue Mcallister catheter placement for maximum drainage  Medical optimization per critical care team  S/p right PCN by IR 10/31      Bladder cancer 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 [facility])  Assessment & Plan  TURBT May 2022 for muscle invasive bladder cancer with inability to visualize distal right ureter which required nephrostomy tube eventually transition to PCNU and now with internalized stent  Did not tolerate chemotherapy due to toxicity  Completed radiation            Subjective: Patient resting in bed developed lethargy overnight and increase of creatinine level. Currently receiving dialysis. Family at bedside. Updated from urological standpoint no further intervention is needed as he has Mcallister catheter as well as nephrostomy tube to drain his urinary system. 24 HR EVENTS:   Patient now lethargic with rising creatinine level receiving dialysis. Levophed had been held and is being restarted at this time    Patient has   lethargy and did not offer complaints. Arousable with verbal stimuli       Review of Systems   Constitutional:  Positive for activity change and fatigue. Genitourinary:  Positive for penile swelling and scrotal swelling. Negative for hematuria.        Objective:  Nursing Rounds:   Vitals: Blood pressure (!) 94/49, pulse 90, temperature 97.5 °F (36.4 °C), temperature source Oral, resp. rate 17, height 5' 3" (1.6 m), weight 48.9 kg (107 lb 12.9 oz), SpO2 97 %. ,Body mass index is 19.1 kg/m². INS & OUTS:  I/O last 24 hours: In: 2380 [P.O.:980; I.V.:480; Blood:350; Other:320; IV Piggyback:250]  Out: 773 [Urine:345; Other:112]    Physical Exam  Vitals and nursing note reviewed. HENT:      Head: Normocephalic. Pulmonary:      Effort: Pulmonary effort is normal. No respiratory distress. Abdominal:      General: Abdomen is flat. There is no distension. Palpations: Abdomen is soft. Tenderness: There is no abdominal tenderness. Genitourinary:     Penis: Swelling present. No erythema, tenderness, discharge or lesions. Testes:         Right: Swelling present. Left: Swelling present. Comments: Right nephrostomy tube draining pink-colored urine  Musculoskeletal:         General: No swelling. Skin:     General: Skin is warm and dry. Neurological:      General: No focal deficit present. Mental Status: Carol Robledo is oriented to person, place, and time. Carol Robledo is lethargic.          Labs:  Recent Labs     10/30/23  0407 10/30/23  1414 10/31/23  0456 10/31/23  1548 11/01/23  0550   WBC 2.62* 20.64* 14.80* 15.04* 17.62*       Recent Labs     10/30/23  0407 10/30/23  1414 10/31/23  0456 10/31/23  0640 10/31/23  1548 11/01/23  0550   HGB 9.4* 7.4* 6.9* 6.3* 8.5* 9.5*     Recent Labs     10/30/23  0407 10/30/23  1414 10/31/23  0456 10/31/23  0640 10/31/23  1548 11/01/23  0550   HCT 29.6* 21.4* 20.9* 19.2* 24.4* 28.8*     Recent Labs     10/30/23  0407 10/30/23  1102 10/30/23  1922 10/31/23  0456 11/01/23  0550 11/01/23  1230   CREATININE 6.40* 6.52* 6.24* 6.68* 7.55* 7.51*     Lab Results   Component Value Date    HGB 9.5 (L) 11/01/2023    HCT 28.8 (L) 11/01/2023    WBC 17.62 (H) 11/01/2023    PLT 67 (L) 11/01/2023     Lab Results   Component Value Date    K 5.7 (H) 11/01/2023    CL 94 (L) 11/01/2023    CO2 18 (L) 11/01/2023    BUN 93 (H) 11/01/2023    CREATININE 7.51 (H) 11/01/2023    CALCIUM 8.0 (L) 11/01/2023     Urinalysis: Innumerable WBC's per HPF, innumerable RBC's per HPF, and occasional + bacteria  Urine Culture: Growth: Mixed contaminants    History:    Past Medical History:   Diagnosis Date    Cirrhosis ([address])     Dementia ([address])      Past Surgical History:   Procedure Laterality Date    IR NEPHROSTOMY TUBE PLACEMENT  10/31/2023    IR PORT REMOVAL  10/30/2023     No family history on file. Social History     Socioeconomic History    Marital status: Single     Spouse name: None    Number of children: None    Years of education: None    Highest education level: None   Occupational History    None   Tobacco Use    Smoking status: None    Smokeless tobacco: None   Substance and Sexual Activity    Alcohol use: None    Drug use: None    Sexual activity: None   Other Topics Concern    None   Social History Narrative    None     Social Determinants of Health     Financial Resource Strain: Not on file   Food Insecurity: No Food Insecurity (10/26/2023)    Hunger Vital Sign     Worried About Running Out of Food in the Last Year: Never true     Ran Out of Food in the Last Year: Never true   Transportation Needs: No Transportation Needs (10/26/2023)    PRAPARE - Transportation     Lack of Transportation (Medical): No     Lack of Transportation (Non-Medical):  No   Physical Activity: Not on file   Stress: Not on file   Social Connections: Not on file   Intimate Partner Violence: Not on file   Housing Stability: Low Risk  (10/26/2023)    Housing Stability Vital Sign     Unable to Pay for Housing in the Last Year: No     Number of Places Lived in the Last Year: 1     Unstable Housing in the Last Year: No       The following portions of the patient's history were reviewed and updated as appropriate: allergies, current medications, past family history, past medical history, past social history, past surgical history and problem list    LUCY Najera  Date: 11/1/2023 Time: 3:48 PM

## 2023-11-01 NOTE — CONSULTS
Vancomycin IV Pharmacy-to-Dose Consultation    Rah Palacio is a 74 y.o. adult who was receiving Vancomycin IV with management by the Pharmacy Consult service for treatment of Bacteremia (goal -600, trough >10)  . The patient’s Vancomycin therapy has been discontinued. Thank you for allowing us to take part in this patient's care. Pharmacy will sign-off now; please call or re-consult if there are any questions.         Renata Interiano, PharmD, BCCCP  Critical Care Clinical Pharmacist  Available via [facility] Text

## 2023-11-01 NOTE — OCCUPATIONAL THERAPY NOTE
Occupational Therapy Treatment Note      Hannah Mya    11/1/2023    Principal Problem:    Fall  Active Problems:    Bladder cancer 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 [facility])    Insomnia    History of liver transplant ([address])    T2DM (type 2 diabetes mellitus) ([address])    Acute on chronic renal failure     UTI (urinary tract infection)    Dementia ([facility])    CKD (chronic kidney disease)    Thrombocytopenia ([facility])    History of CVA (cerebrovascular accident)    Anemia    MSSA bacteremia    Septic shock ([address])      Past Medical History:   Diagnosis Date    Cirrhosis ([address])     Dementia ([address])        Past Surgical History:   Procedure Laterality Date    IR PORT REMOVAL  10/30/2023        11/01/23 0945   OT Last Visit   OT Visit Date 11/01/23   Note Type   Note Type Treatment   Pain Assessment   Pain Assessment Tool 0-10   Pain Score No Pain   Restrictions/Precautions   Weight Bearing Precautions Per Order No   Other Precautions Chair Alarm; Bed Alarm; Fall Risk;Pain;Multiple lines   Lifestyle   Autonomy per  family at bedside, pt was fully independent w self care, mobility at baseline   Reciprocal Relationships supportive family   ADL   Grooming Assistance 5  Supervision/Setup   Grooming Deficit Setup; Wash/dry hands; Wash/dry face;Brushing hair   UB Bathing Assistance 5  Supervision/Setup   UB Bathing Deficit Increased time to complete   LB Dressing Assistance 2  Maximal Assistance   Functional Standing Tolerance   Time 1-2 min   Activity standing w mod assist of 1   Comments pt c/o feeling weak today   Bed Mobility   Supine to Sit 3  Moderate assistance   Additional items Assist x 1; Increased time required;LE management   Additional Comments fair balance sitting at EOB   Transfers   Sit to Stand 3  Moderate assistance   Additional items Assist x 1; Increased time required;Verbal cues   Stand to Sit 3  Moderate assistance   Additional items Assist x 1; Increased time required;Verbal cues   Stand pivot 3  Moderate assistance   Additional items Assist x 1; Increased time required;Verbal cues   Subjective   Subjective "I feel weak"   Cognition   Overall Cognitive Status Impaired   Arousal/Participation Cooperative   Attention Attends with cues to redirect   Orientation Level Oriented X4   Memory Decreased recall of precautions;Decreased recall of recent events;Decreased short term memory   Following Commands Follows one step commands with increased time or repetition   Activity Tolerance   Activity Tolerance Patient limited by fatigue;Patient tolerated treatment well   Medical Staff Made Aware ok to see per ROMANA [place]   Assessment   Assessment Pt seen for skilled OT treatment session w focus on mobility, OOB, basic self care. Pt c/o feeling weak from procedure yesterday (nephrostomy tube placement). Pt required mod assist to get to EOB, sligh difficulty holding self up for long periods of time, min assist needed for postural control. Pt tended to lean forward. Mod assist needed for sit to stand. While up in stance, pt dependent for toileting hygiene. Mod assist of 1 for SPT to recliner chair. Once up in chair, pt able to complete grooming w S/set up, as well as UB bathing. Max assist needed for LB dressing. OT will continue to follow as per POC. Plan   Treatment Interventions ADL retraining;Functional transfer training; Endurance training;Patient/family training; Compensatory technique education; Energy conservation; Activityengagement   Goal Expiration Date 11/07/23   OT Treatment Day 2   OT Frequency 2-3x/wk   Discharge Recommendation   Rehab Resource Intensity Level, OT II (Moderate Resource Intensity)   AM-PAC Daily Activity Inpatient   Lower Body Dressing 2   Bathing 2   Toileting 2   Upper Body Dressing 3   Grooming 3   Eating 4   Daily Activity Raw Score 16   Daily Activity Standardized Score (Calc for Raw Score >=11) 35.96

## 2023-11-01 NOTE — PROGRESS NOTES
NEPHROLOGY PROGRESS NOTE   Gloria Vail 74 y.o. adult MRN: [id number]  [facility]: [id number]  Reason for Consult: TAVON    ASSESSMENT AND PLAN:  TAVON on CKD stage IV, baseline creatinine around 3.6, follows with Dr. Darlene Nascimento  -TAVON suspect secondary to ATN, UTI, MSSA bacteremia, hypotension, this is in the setting of right renal atrophy with moderate right hydronephrosis  -Currently remains off Levophed at the time of my encounter.  -Creatinine continues to worsen up to 7.5 today. Urine output remains lower.  -Status post initial IV fluid resuscitation, now remains off.  -Concern if patient developing slowly uremic symptoms with feeling very tired, fatigued. With severe hyperkalemia, worsening acidosis, worsening renal failure, minimal urine output, will start patient on hemodialysis today.  -Plan for full session of HD today. Will do second session of HD tomorrow.  -Temporary HD catheter to be placed by ICU team.  -Significant other [facility]) provided dialysis consent which is in the chart. Patient also provided verbal consent. -BMP in a.m. Right renal atrophy with moderate right hydronephrosis, status post right ureteral stent.  -Status post Mcallister catheter, status post right-sided PCN tube placement with purulent urine drained on 10/31/2023. 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  -Urology following. As per medical records review, did not tolerate chemo due to toxicity. s/p radiation. MSSA bacteremia, septic shock suspect in the setting of ascending UTI, status post right ureteral stent, currently antibiotic as per ID. -Currently remains off Levophed at the time of my encounter. BP remains soft     History of liver transplant about 20 years ago as per patient. Now remains on tacrolimus.   Tacrolimus trough level 7.6 on 10/30/2023   -Recommend discussion with liver transplant team if any adjustment in tacrolimus dosing needed. Metabolic acidosis, bicarb level dropping 18.  lactic acid level 1.6 normal.  -Increase sodium bicarbonate to 2 tablet p.o. 3 times daily. Monitor with dialysis. Discussed above plan in detail with ICU team regarding plan for dialysis today, increasing sodium bicarbonate and they agree with above recommendations. SUBJECTIVE:  Patient seen and examined at bedside. He feels overall very tired. Denies any nausea or vomiting. Remains on room air.     OBJECTIVE:  Current Weight: Weight - Scale: 48.9 kg (107 lb 12.9 oz)  Vitals:    11/01/23 1214   BP:    Pulse: 88   Resp: 17   Temp: 97.5 °F (36.4 °C)   SpO2: 100%       Intake/Output Summary (Last 24 hours) at 11/1/2023 1230  Last data filed at 11/1/2023 0830  Gross per 24 hour   Intake 1090 ml   Output 310 ml   Net 780 ml     Wt Readings from Last 3 Encounters:   11/01/23 48.9 kg (107 lb 12.9 oz)     Temp Readings from Last 3 Encounters:   11/01/23 97.5 °F (36.4 °C) (Oral)     BP Readings from Last 3 Encounters:   11/01/23 120/65     Pulse Readings from Last 3 Encounters:   11/01/23 88        Physical Examination:  Eyes: Mild conjunctival pallor present  Neck: No obvious lymphadenopathy appreciated  Respiratory: Bilateral air entry present  CVS: No significant edema in legs  GI: Soft, nondistended  CNS: Active, alert, follows commands  Skin: No new rash  Musculoskeletal: No obvious new gross deformity noted    Medications:    Current Facility-Administered Medications:     acetaminophen (TYLENOL) tablet 975 mg, 975 mg, Oral, Q6H PRN, Yuki Madsen PA-C    atorvastatin (LIPITOR) tablet 80 mg, 80 mg, Oral, Daily With Dinner, [employer], DO, 80 mg at 10/31/23 1600    cefepime (MAXIPIME) 1,000 mg in dextrose 5 % 50 mL IVPB, 1,000 mg, Intravenous, Q24H, Savi Art MD, Last Rate: 100 mL/hr at 11/01/23 1028, 1,000 mg at 11/01/23 1028 chlorhexidine (PERIDEX) 0.12 % oral rinse 15 mL, 15 mL, Mouth/Throat, Q12H [facility], Lyn Taverasorti, DO, 15 mL at 11/01/23 0848    clopidogrel (PLAVIX) tablet 75 mg, 75 mg, Oral, Daily, Cele Verdugo DO, 75 mg at 11/01/23 0848    dextromethorphan-guaiFENesin (ROBITUSSIN DM) oral syrup 10 mL, 10 mL, Oral, Q4H PRN, Catfredis Gormanion, PA-C, 10 mL at 10/30/23 0442    docusate sodium (COLACE) capsule 100 mg, 100 mg, Oral, BID, Susanne Garcia MD, 100 mg at 11/01/23 0849    heparin (porcine) subcutaneous injection 5,000 Units, 5,000 Units, Subcutaneous, Q8H [facility], Cele Verdugo DO, 5,000 Units at 11/01/23 0549    insulin lispro (HumaLOG) 100 units/mL subcutaneous injection 1-5 Units, 1-5 Units, Subcutaneous, TID AC, 1 Units at 11/01/23 1210 **AND** Fingerstick Glucose (POCT), , , TID AC, Cele Verdugo DO    meclizine (ANTIVERT) tablet 25 mg, 25 mg, Oral, Q8H PRN, Cele Verdugo,     nortriptyline (PAMELOR) capsule 10 mg, 10 mg, Oral, HS, Cele Verdugo DO, 10 mg at 10/31/23 2112    ondansetron (ZOFRAN) injection 4 mg, 4 mg, Intravenous, Q4H PRN, Tommy Stone PA-C, 4 mg at 10/30/23 0503    oxybutynin (DITROPAN) tablet 5 mg, 5 mg, Oral, BID, Cele Verdugo DO, 5 mg at 11/01/23 0848    oxyCODONE (ROXICODONE) split tablet 2.5 mg, 2.5 mg, Oral, Q4H PRN, Susanne Garcia MD, 2.5 mg at 10/31/23 2345    pantoprazole (PROTONIX) EC tablet 40 mg, 40 mg, Oral, Daily, Cele Verdugo DO, 40 mg at 11/01/23 0848    pregabalin (LYRICA) capsule 75 mg, 75 mg, Oral, BID, Cele Verdugo DO, 75 mg at 11/01/23 0849    senna (SENOKOT) tablet 17.2 mg, 2 tablet, Oral, Once, Susanne Garcia MD    sodium bicarbonate tablet 650 mg, 650 mg, Oral, TID after meals, [employer], , 650 mg at 11/01/23 1212    sucralfate (CARAFATE) tablet 1 g, 1 g, Oral, 4x Daily (AC & HS), Cele Verdugo DO, 1 g at 11/01/23 1212    [START ON 11/2/2023] tacrolimus (PROGRAF) capsule 1 mg, 1 mg, Oral, Q12H [address], Edgar Shea Con[employer], DO    temazepam (RESTORIL) capsule 15 mg, 15 mg, Oral, HS PRN, Cele De La Fuenteel, DO, 15 mg at 10/31/23 2112    zolpidem (AMBIEN) tablet 5 mg, 5 mg, Oral, HS PRN, Everkmario Verdugo, DO, 5 mg at 10/31/23 2112    Laboratory Results:  Results from last 7 days   Lab Units 11/01/23  0550 10/31/23  1548 10/31/23  0640 10/31/23  0456 10/30/23  1922 10/30/23  1414 10/30/23  1102 10/30/23  0900 10/30/23  0407 10/29/23  0553 10/28/23  0654 10/27/23  0526   WBC Thousand/uL 17.62* 15.04*  --  14.80*  --  20.64*  --   --  2.62* 8.79  --  6.99   HEMOGLOBIN g/dL 9.5* 8.5* 6.3* 6.9*  --  7.4*  --   --  9.4* 7.2*  --  8.9*   HEMATOCRIT % 28.8* 24.4* 19.2* 20.9*  --  21.4*  --   --  29.6* 22.9*  --  26.2*   PLATELETS Thousands/uL 67* 67*  --  66*  --  87*  --  77* 113* 101*  --  94*   SODIUM mmol/L 127*  --   --  132* 132*  --  136  --  132* 134* 134* 135   POTASSIUM mmol/L 6.0*  --   --  4.6 5.1  --  3.1*  --  4.0 4.0 3.7 3.7   CHLORIDE mmol/L 96  --   --  100 99  --  100  --  97 101 103 103   CO2 mmol/L 18*  --   --  21 20*  --  22  --  20* 21 22 21   BUN mg/dL 88*  --   --  73* 70*  --  73*  --  72* 57* 50* 47*   CREATININE mg/dL 7.55*  --   --  6.68* 6.24*  --  6.52*  --  6.40* 5.44* 4.67* 4.66*   CALCIUM mg/dL 7.8*  --   --  7.3* 7.1*  --  7.2*  --  7.9* 7.4* 7.4* 7.7*   MAGNESIUM mg/dL 2.1  --   --  2.0  --   --  2.0  --   --   --   --  1.4*   PHOSPHORUS mg/dL 6.4*  --   --  4.0  --   --   --   --   --   --   --  3.0       IR port removal   Final Result by Nida Billy MD (10/30 0065)      Right chest port removal at bedside. Plan:      Return to IR as needed. Workstation performed: [id number]         XR chest portable   Final Result by Sarika Peña MD (10/31 9494)   Bilateral pulmonary nodules suspicious for tumor.  Further assessment via CT recommended      Interval placement of left IJ CVP line without complication      Persistent suspected right pulmonary pneumonia Workstation performed: [id number]         XR chest pa & lateral   Final Result by Fabiano Hernandez MD (10/27 1140)   Possible bibasilar infiltrates or crowding of vessels. Bilateral metastasis unchanged. Workstation performed: [id number]         XR chest portable   Final Result by Mandy Carey MD (10/26 9666)      Mild opacity at the left base with loss of the left diaphragm. This could be due to atelectasis but pneumonia not excluded in the appropriate clinical setting. Bilateral lung metastases. Workstation performed: [id number]         CT abdomen pelvis with contrast   Final Result by Chapin Andrews MD (10/23 4175)      1. Redemonstration of right renal atrophy and moderate right hydronephrosis with a double-J right ureteral stent. There is mild diffuse right urothelial thickening and enhancement. There is also mild diffuse thickening of the urinary bladder and adjacent    fat stranding. This may be explained by an infectious etiology including right ureteritis and cystitis. 2. Skin thickening and/or subcutaneous edema in the periumbilical area may be due to history of trauma. Workstation performed: [id number]         CT head without contrast   Final Result by Janeth Jay DO (10/23 1300)   Addendum (preliminary) 1 of 1 by Janeth Jay DO (10/23 1300)   ADDENDUM:   Prior from July 31, 2023 performed under separate medical record number. Prior left-sided aneurysm clipping and local encephalomalacia is    unchanged. I personally discussed the CT brain and C-spine with Clarence    on 10/23/2023 1:00 PM.            Final   No acute intracranial abnormality. Workstation performed: [id number]         CT spine cervical without contrast   Final Result by Janeth Jay DO (10/23 1253)   No cervical spine fracture or traumatic malalignment.                   Workstation performed: [id number] XR trauma multiple   Final Result by Moses De La Garza MD (10/23 5783)      Spiculated mass in the right upper lobe, little changed since 8/1/2023. Interval increase in size of bilateral pulmonary metastases. No evidence of acute traumatic abnormality in the chest.      The study was marked in [facility] for immediate notification. Workstation performed: [id number]         XR chest portable   Final Result by Moses De La Garza MD (10/24 1860)      Spiculated mass in the right upper lobe, little changed since 8/1/2023. Interval increase in size of bilateral pulmonary metastases. No evidence of acute traumatic abnormality in the chest.      The study was marked in [facility] for immediate notification. Workstation performed: Teryl Sink kidney and bladder    (Results Pending)   IR nephrostomy tube placement    (Results Pending)   XR chest portable ICU    (Results Pending)       Portions of the record may have been created with voice recognition software. Occasional wrong word or "sound a like" substitutions may have occurred due to the inherent limitations of voice recognition software. Read the chart carefully and recognize, using context, where substitutions have occurred.

## 2023-11-01 NOTE — PROGRESS NOTES
Progress Note - Infectious Disease   Diana Reilly 74 y.o. adult MRN: [id number]  Unit/Bed#: [facility] 09 Encounter: [id number]      Impression/Plan:     1. Ascending urinary tract infection. In the setting of #5 below and internal right ureteral stent. Patient with reported dysuria prior to admission, UA with innumerable white blood cells. Urine culture is growing MSSA and there is low colony count of alpha hemolytic strep. CT A/P shows moderate right hydronephrosis, right urothelial thickening and enhancement and bladder wall thickening concerning for a sending ureteritis and cystitis. Patient initially refused felix catheter but then agreeable after decompensation and transfer to the ICU 10/30. Repeat blood cultures show no growth, Ucx mixed contaminants. Decompensation likely due to lack of source control in setting of persistent hydronephrosis and retained Port-A-Cath rather than more resistant infection. Now status post right PCN placement 10/31 with purulent fluid drained. Gram stain with GNRs, GPCs in pairs.              -Continue renally dosed IV cefepime for now pending IR cultures.   -Okay to hold vancomycin as low suspicion for MRSA. -Follow-up IR cultures and tailor antibiotics accordingly              -Urology follow-up     2. MSSA bacteremia. Due to #1 above. While Staphylococcus aureus isolated in the urine is likely due to seeding from a bloodstream infection, in this case a UTI seems to be the primary cause of his bacteremia. Risk factors for isolation of staph in the urine include prior urinary tract manipulation and his chronic stent. He has no open skin wounds or signs of SSTI. Port has been removed. TTE no vegetations. Repeat blood cultures are negative.              -Antibiotic plan as above.              -follow up repeat blood cultures               -Anticipate a 4-week total course of antibiotics from blood culture clearance for this issue     3.   Sepsis, evolving since admission with fever and tachycardia, now hypotension 10/30 requiring pressors. Due to #1 and 2 above. Also consider contribution of malignancy due to worsening pul continue IV cefepime for now pending IR cultures. Monary metastatic disease. Suspect decompensation is more due to a lack of source control in the setting of retained Port-A-Cath and persistent right hydronephrosis (patient declined Mcallister catheter earlier in the admission) rather than new or drug-resistant infection. Status post right PCN placement 10/31 with infected appearing fluid drained              -Antibiotics as above              -Follow-up blood cultures              -Monitor fever curve, white blood cell count     4. 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 Likely secondary to ATN and hydronephrosis. Creatinine now worsening due to recurrent fever and hypotension, today up to 7.5.              -Nephrology follow-up ongoing              -Dose adjust antibiotics for creatinine clearance     5. Advanced bladder cancer status post TURBT May 0136 complicated by right ureteral obstruction status post right internal ureteral stent. The patient has metastatic disease to the lungs. He previously underwent radiation and chemotherapy but is no longer on cancer directed therapy.              -Urology follow-up              -Palliative care follow-up outpatient     6. History of liver transplant in 2002. Due to alcohol abuse and hepatitis C. Patient currently on tacrolimus.              -Follow-up tacrolimus level     7. Type 2 diabetes mellitus with hyperglycemia. Recent hemoglobin A1c 7.3%. This is a risk factor for infection. Glycemic control per primary. 8.  Thrombocytopenia, chronic. Monitor platelets     Antibiotics:  Abx day 10  Vancomycin/Cefepime     I discussed above plan with patient and his wife at bedside, and with critical care service. Subjective:  Patient is feeling much better today. Minimal pain. Tolerating oral intake. No fevers. Improving blood pressures. Objective:  Vitals:  Temp:  [95.7 °F (35.4 °C)-97.7 °F (36.5 °C)] 97.7 °F (36.5 °C)  HR:  [70-88] 88  Resp:  [12-28] 20  BP: ()/() 101/66  SpO2:  [95 %-100 %] 96 %  Temp (24hrs), Av.2 °F (36.2 °C), Min:95.7 °F (35.4 °C), Max:97.7 °F (36.5 °C)  Current: Temperature: 97.7 °F (36.5 °C)    Physical Exam:   General:  No acute distress nontoxic, sitting comfortably in chair  HEENT: Atraumatic normocephalic  Neck: Trachea midline  Psychiatric:  Awake and alert  Pulmonary:  Normal respiratory excursion without accessory muscle use  Abdomen:  Soft, no rigidity or guarding  Right-sided PCN in place with sanguinous output in bag. Extremities:  No edema  Skin:  No rashes  Neuro: Moves all extremities spontaneously. Lab Results:  I have personally reviewed pertinent labs. Results from last 7 days   Lab Units 23  0550 10/31/23  0456 10/30/23  1922   POTASSIUM mmol/L 6.0* 4.6 5.1   CHLORIDE mmol/L 96 100 99   CO2 mmol/L 18* 21 20*   BUN mg/dL 88* 73* 70*   CREATININE mg/dL 7.55* 6.68* 6.24*   CALCIUM mg/dL 7.8* 7.3* 7.1*     Results from last 7 days   Lab Units 23  0550 10/31/23  1548 10/31/23  0640 10/31/23  0456   WBC Thousand/uL 17.62* 15.04*  --  14.80*   HEMOGLOBIN g/dL 9.5* 8.5* 6.3* 6.9*   PLATELETS Thousands/uL 67* 67*  --  66*     Results from last 7 days   Lab Units 10/31/23  1504 10/30/23  1522 10/30/23  1507 10/30/23  1102 10/30/23  0445 10/28/23  0603 10/27/23  1620 10/26/23  0155   BLOOD CULTURE   --  No Growth at 24 hrs. No Growth at 24 hrs.  --   --  No Growth at 72 hrs. No Growth at 72 hrs. No Growth After 4 Days. No Growth After 5 Days.    GRAM STAIN RESULT  4+ Gram negative rods*  3+ Gram positive cocci in pairs*  3+ Polys*  --   --   --   --   --   --   --    URINE CULTURE   --   --   --  10,000-19,000 cfu/ml  --   --   --   --    MRSA CULTURE ONLY   --   --   --   --  No Methicillin Resistant Staphlyococcus aureus (MRSA) isolated  --   --   -- Imaging Studies:   I have personally reviewed pertinent imaging study reports and images in PACS. EKG, Pathology, and Other Studies:   I have personally reviewed pertinent reports. IR procedure report reviewed.

## 2023-11-01 NOTE — PROGRESS NOTES
Daily Progress Note - Critical Care   Maya Rothman 74 y.o. adult MRN: [id number]  [facility]: [id number]        ----------------------------------------------------------------------------------------  HPI/24hr events: 73 y/o M with a PMHx of alcoholic cirrhosis s/p liver transplant in 2003 on tacro, metastatic bladder cancer s/p TURBT with metastasis to the lungs c/w right ureteral obstruction s/p stent, CVA CKD IV, T2DM who was admitted to the hospital on 10/23/23 for multiple falls. Found to have MSSA bactermia likely d/t port-a-cath and UTI in the presence of urinary stent. Patient was being treated with Ancef. On 10/30/23, pat was a RRT for fever and hypotension. He was transferred to the MICU for pressor support. Course has been c/w TAVON on CKD IV. He was started on cefepime/vanc and underwent PCN drain placement on 10/31/23 for right hydronephrosis due to chronic obstruction from bladder cancer. ---------------------------------------------------------------------------------------  SUBJECTIVE  Overnight events: c/o abdominal pain d/t swelling/distension. Pain improved this morning. He denies SOB, chest pain, or additional complaints. Review of Systems  Review of systems was reviewed and negative unless stated above in HPI/24-hour events   ---------------------------------------------------------------------------------------  Assessment and Plan:  Neuro:   Diagnosis: no active issues     CV:   Diagnosis: septic/distributive shock. Found to have  MSSA bacteremia on presentation with urine cultures + alpha hemolytic strep. Suspected seeding from blood in the setting of port-a-cath. Previously being treated with ancef. Now with septic shock. Suspect pyelonephritis in the setting of bladder cancer with chronic right pyelonephritis s/p stent. Plan: broaden abx to vancomycin and cefepime; can consider to narrow based on repeat blood and urine cultures.     Off levo  Diagnosis: hx of CVA  C/w lipitor and plavix     Pulm:  Diagnosis: no active issues     GI:   Diagnosis: GERD  Plan: pantoprazole 40 mg qd, carafate  Diagnosis: hx of alcoholic cirrhosis s/p liver transplant in 2003 on tacrolimus  Plan: tacro level appropriate      :   Diagnosis: TAVON on CKD IV due to ATN from hypotension and post renal obstruction in the setting of chronic right hydronephrosis from bladder cancer s/p stent with atrophic right kidney. 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 After long discussion the patient and his wife decided not to proceed with any additional gemcitabine finished radiation therapy on 8/25/2022        Endo:   Diagnosis: hx of T2DM  Plan: d/c Lantus 10 units due to below goal Bgs; continue SSI        ID:   Septic/distributive shock. Found to have  MSSA bacteremia on presentation with urine cultures + alpha hemolytic strep. Suspected seeding from blood in the setting of port-a-cath. Previously being treated with ancef. Now with septic shock. Suspect ? pyelonephritis in the setting of bladder cancer with chronic right pyelonephritis s/p stent. Underwent PCN placement on 10/31/23. Plan: broaden abx to vancomycin and cefepime.    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   Plan: continue lyrica, nortriptyline         Disposition: Transfer to Stepdown Level 2  Code Status: Level 3 - DNAR and DNI  ---------------------------------------------------------------------------------------  ICU CORE MEASURES    Prophylaxis   VTE Pharmacologic Prophylaxis: Heparin  VTE Mechanical Prophylaxis: sequential compression device    Invasive Devices Review  Invasive Devices       Central Venous Catheter Line  Duration             CVC Central Lines 10/30/23 1 day              Peripheral Intravenous Line  Duration             Peripheral IV 10/30/23 Left;Upper;Ventral (anterior) Arm 1 day              Drain  Duration             Urethral Catheter Temperature probe 16 Fr. 1 day    Nephrostomy Right 10.2 Fr. <1 day                  Can any invasive devices be discontinued today? Not applicable  ---------------------------------------------------------------------------------------  OBJECTIVE    Vitals   Vitals:    23 0500 23 0600 23 0700 23 0730   BP: (!) 85/47 (!) 159/110 111/62    Pulse: 76 78 78 76   Resp: 15 17 15 12   Temp:    97.7 °F (36.5 °C)   TempSrc:    Oral   SpO2: 96% 96% 96% 95%   Weight:  48.9 kg (107 lb 12.9 oz)     Height:         Temp (24hrs), Av.1 °F (36.2 °C), Min:95.7 °F (35.4 °C), Max:97.7 °F (36.5 °C)  Current: Temperature: 97.7 °F (36.5 °C)    Respiratory:  SpO2: SpO2: 95 %, SpO2 Activity: SpO2 Activity: At Rest  Nasal Cannula O2 Flow Rate (L/min): 2 L/min    Invasive/non-invasive ventilation settings   Respiratory      Lab Data (Last 4 hours)      None           O2/Vent Data (Last 4 hours)      None                    Physical Exam  HENT:      Mouth/Throat:      Mouth: Mucous membranes are dry. Cardiovascular:      Rate and Rhythm: Normal rate and regular rhythm. Pulmonary:      Effort: Pulmonary effort is normal. No respiratory distress. Abdominal:      General: Bowel sounds are normal. There is distension. Palpations: Abdomen is soft. Tenderness: There is no abdominal tenderness. Musculoskeletal:      Right lower leg: No edema. Left lower leg: No edema. Skin:     General: Skin is warm and dry. Capillary Refill: Capillary refill takes less than 2 seconds. Findings: Bruising present. Neurological:      General: No focal deficit present. Mental Status: Lauren Sylvester is alert and oriented to person, place, and time. Mental status is at baseline. Psychiatric:         Mood and Affect: Mood normal.         Behavior: Behavior normal.         Thought Content:  Thought content normal.         Judgment: Judgment normal.         Laboratory and Diagnostics:  Results from last 7 days   Lab Units 11/01/23  0550 10/31/23  1548 10/31/23  0640 10/31/23  0456 10/30/23  1414 10/30/23  0900 10/30/23  0407 10/29/23  0553 10/27/23  0526 10/26/23  0636 10/26/23  0154   WBC Thousand/uL 17.62* 15.04*  --  14.80* 20.64*  --  2.62* 8.79 6.99 5.54 6.08   HEMOGLOBIN g/dL 9.5* 8.5* 6.3* 6.9* 7.4*  --  9.4* 7.2* 8.9* 8.1* 8.5*   HEMATOCRIT % 28.8* 24.4* 19.2* 20.9* 21.4*  --  29.6* 22.9* 26.2* 24.0* 26.0*   PLATELETS Thousands/uL 67* 67*  --  66* 87* 77* 113* 101* 94* 75* 82*   NEUTROS PCT %  --   --   --   --   --   --   --   --   --  77* 80*   BANDS PCT %  --   --   --  29*  --   --   --   --   --   --   --    MONOS PCT %  --   --   --   --   --   --   --   --   --  9 8   MONO PCT %  --   --   --  1*  --   --   --   --   --   --   --    EOS PCT %  --   --   --  1  --   --   --   --   --  2 1     Results from last 7 days   Lab Units 11/01/23  0550 10/31/23  0456 10/30/23  1922 10/30/23  1102 10/30/23  0407 10/29/23  0553 10/28/23  0654   SODIUM mmol/L 127* 132* 132* 136 132* 134* 134*   POTASSIUM mmol/L 6.0* 4.6 5.1 3.1* 4.0 4.0 3.7   CHLORIDE mmol/L 96 100 99 100 97 101 103   CO2 mmol/L 18* 21 20* 22 20* 21 22   ANION GAP mmol/L 13 11 13 14 15 12 9   BUN mg/dL 88* 73* 70* 73* 72* 57* 50*   CREATININE mg/dL 7.55* 6.68* 6.24* 6.52* 6.40* 5.44* 4.67* CALCIUM mg/dL 7.8* 7.3* 7.1* 7.2* 7.9* 7.4* 7.4*   GLUCOSE RANDOM mg/dL 153* 116 152* 100 163* 126 110     Results from last 7 days   Lab Units 11/01/23  0550 10/31/23  0456 10/30/23  1102 10/27/23  0526   MAGNESIUM mg/dL 2.1 2.0 2.0 1.4*   PHOSPHORUS mg/dL 6.4* 4.0  --  3.0      Results from last 7 days   Lab Units 10/30/23  0900   INR  1.27*          Results from last 7 days   Lab Units 10/30/23  1414 10/30/23  1102 10/30/23  0410   LACTIC ACID mmol/L 1.1 2.7* 1.6     ABG:    VBG:    Results from last 7 days   Lab Units 10/31/23  0456 10/30/23  0410 10/29/23  0553 10/26/23  0154   PROCALCITONIN ng/ml 119.68* 49.65* 56.62* 4.11*       Micro  Results from last 7 days   Lab Units 10/31/23  1504 10/30/23  1522 10/30/23  1507 10/30/23  1102 10/30/23  0445 10/28/23  0603 10/27/23  1620 10/26/23  0155   BLOOD CULTURE   --  No Growth at 24 hrs. No Growth at 24 hrs.  --   --  No Growth at 72 hrs. No Growth at 72 hrs. No Growth After 4 Days. No Growth After 5 Days. GRAM STAIN RESULT  4+ Gram negative rods*  3+ Gram positive cocci in pairs*  3+ Polys*  --   --   --   --   --   --   --    URINE CULTURE   --   --   --  10,000-19,000 cfu/ml  --   --   --   --    MRSA CULTURE ONLY   --   --   --   --  No Methicillin Resistant Staphlyococcus aureus (MRSA) isolated  --   --   --        Intake and Output  I/O         10/30 0701  10/31 0700 10/31 0701  11/01 0700 11/01 0701 11/02 0700    P. O. 180 600     I.V. (mL/kg) 1810.8 (35.3) 250 (5.1)     Blood  350     Other  10     IV Piggyback 350 150     Total Intake(mL/kg) 2340.8 (45.6) 1360 (27.8)     Urine (mL/kg/hr) 245 (0.2) 345 (0.3)     Stool 0 0     Total Output 245 345     Net +2095.8 +1015            Unmeasured Stool Occurrence 2 x 2 x           UOP: 230 ml/hr     Height and Weights   Height: 5' 3" (160 cm)     Body mass index is 19.1 kg/m².   Weight (last 2 days)       Date/Time Weight    11/01/23 0600 48.9 (107.81)    10/31/23 1003 51.3 (113.1)    10/31/23 0600 51.3 (113.1)    10/30/23 0600 62.3 (137.35)              Nutrition       Diet Orders   (From admission, onward)                 Start     Ordered    10/31/23 1608  Diet Regular; Regular House  Diet effective now        References:    Adult Nutrition Support Algorithm    RD Therapeutic Diet Order Protocol   Question Answer Comment   Diet Type Regular    Regular Regular House    RD to adjust diet per protocol?  Yes        10/31/23 1607    10/31/23 1248  Dietary nutrition supplements  Once        Question Answer Comment   Select Supplement: Ensure Plus High Protein Strawberry    Frequency Lunch        10/31/23 1247                      Active Medications  Scheduled Meds:  Current Facility-Administered Medications   Medication Dose Route Frequency Provider Last Rate    acetaminophen  975 mg Oral Q6H PRN Marry Macias PA-C      atorvastatin  80 mg Oral Daily With [employer], DO      calcium gluconate  1 g Intravenous Once [employer], DO 1 g (11/01/23 0724)    cefepime  1,000 mg Intravenous Q24H Estela Orly, DO 1,000 mg (10/31/23 1035)    chlorhexidine  15 mL Mouth/Throat Q12H [address] [employer], DO      clopidogrel  75 mg Oral Daily Harnishkumar Verdugo, DO      dextromethorphan-guaiFENesin  10 mL Oral Q4H PRN Marry Macias PA-C      docusate sodium  100 mg Oral BID Daryle Householder, MD      heparin (porcine)  5,000 Units Subcutaneous Q8H [address] [facility], DO      insulin lispro  1-5 Units Subcutaneous TID AC Harnishkmario Verdugo, DO      meclizine  25 mg Oral Q8H PRN Cele Verdugo, DO      nortriptyline  10 mg Oral HS Harkaushal Verdugo, DO      ondansetron  4 mg Intravenous Q4H PRN Marry Macias PA-C      oxybutynin  5 mg Oral BID Cele Verdugo, DO      oxyCODONE  2.5 mg Oral Q4H PRN Daryle Householder, MD      pantoprazole  40 mg Oral Daily Harnishkmario Verdugo, DO      pregabalin  75 mg Oral BID Harpandakmario Verdugo, DO      senna  2 tablet Oral Once Daryle Householder, MD      sodium bicarbonate  650 mg Oral TID after meals Regi Castellanos, DO      sucralfate  1 g Oral 4x Daily (AC & HS) Cele Verdugo, DO      tacrolimus  1 mg Oral Q12H [address] Cele Verdugo, DO      temazepam  15 mg Oral HS PRN Cele Verdugo, DO      zolpidem  5 mg Oral HS PRN [facility], DO       Continuous Infusions:     PRN Meds:   acetaminophen, 975 mg, Q6H PRN  dextromethorphan-guaiFENesin, 10 mL, Q4H PRN  meclizine, 25 mg, Q8H PRN  ondansetron, 4 mg, Q4H PRN  oxyCODONE, 2.5 mg, Q4H PRN  temazepam, 15 mg, HS PRN  zolpidem, 5 mg, HS PRN        Allergies   No Known Allergies  ---------------------------------------------------------------------------------------  Advance Directive and Living Will:      Power of :    POLST:    ---------------------------------------------------------------------------------------  Care Time Delivered:   No Critical Care time spent     [facility], DO      Portions of the record may have been created with voice recognition software. Occasional wrong word or "sound a like" substitutions may have occurred due to the inherent limitations of voice recognition software.   Read the chart carefully and recognize, using context, where substitutions have occurred

## 2023-11-01 NOTE — PROCEDURES
Temporary HD Catheter    Date/Time: 10/23/2023 11:05 AM    Performed by: Casi Lara MD  Authorized by: Casi Lara MD    Patient location:  Bedside  Consent:     Consent obtained:  Written    Consent given by:  Patient    Risks discussed:  Arterial puncture, incorrect placement, nerve damage, pneumothorax, infection and bleeding    Alternatives discussed:  No treatment and delayed treatment  Universal protocol:     Procedure explained and questions answered to patient or proxy's satisfaction: yes      Relevant documents present and verified: yes      Test results available and properly labeled: yes      Radiology Images displayed and confirmed. If images not available, report reviewed: yes      Site/side marked: yes      Immediately prior to procedure, a time out was called: yes      Patient identity confirmed:  Verbally with patient  Pre-procedure details:     Hand hygiene: Hand hygiene performed prior to insertion      Sterile barrier technique: All elements of maximal sterile technique followed      Skin preparation:  2% chlorhexidine    Skin preparation agent: Skin preparation agent completely dried prior to procedure    Indications:     Central line indications: dialysis      Site selection rationale:  Patient already had left side  Anesthesia (see MAR for exact dosages):      Anesthesia method:  None  Procedure details:     Location:  Right internal jugular    Vessel type: vein      Laterality:  Right    Patient position:  Flat    Catheter type:  Triple lumen    Catheter size:  12.5 Fr    Catheter length:  15 cm    Landmarks identified: yes      Ultrasound guidance: yes      Ultrasound image availability:  Still images obtained    Sterile ultrasound techniques: Sterile gel and sterile probe covers were used      Number of attempts:  2    Successful placement: yes    Post-procedure details:     Post-procedure:  Dressing applied and line sutured    Assessment:  Blood return through all ports, no pneumothorax on x-ray and free fluid flow

## 2023-11-02 ENCOUNTER — APPOINTMENT (INPATIENT)
Dept: DIALYSIS | Facility: HOSPITAL | Age: 73
DRG: 871 | End: 2023-11-02
Payer: COMMERCIAL

## 2023-11-02 ENCOUNTER — HOME CARE VISIT (OUTPATIENT)
Dept: HOME HOSPICE | Facility: HOSPICE | Age: 73
End: 2023-11-02
Payer: MEDICARE

## 2023-11-02 LAB
ANION GAP SERPL CALCULATED.3IONS-SCNC: 10 MMOL/L
ANION GAP SERPL CALCULATED.3IONS-SCNC: 12 MMOL/L
ANISOCYTOSIS BLD QL SMEAR: PRESENT
BACTERIA BLD CULT: NORMAL
BACTERIA BLD CULT: NORMAL
BACTERIA CATH TIP CULT: NO GROWTH
BACTERIA SPEC ANAEROBE CULT: ABNORMAL
BACTERIA SPEC BFLD CULT: ABNORMAL
BACTERIA SPEC BFLD CULT: ABNORMAL
BASOPHILS # BLD MANUAL: 0 THOUSAND/UL (ref 0–0.1)
BASOPHILS NFR MAR MANUAL: 0 % (ref 0–1)
BUN SERPL-MCNC: 66 MG/DL (ref 5–25)
BUN SERPL-MCNC: 71 MG/DL (ref 5–25)
BURR CELLS BLD QL SMEAR: PRESENT
CALCIUM SERPL-MCNC: 7.3 MG/DL (ref 8.4–10.2)
CALCIUM SERPL-MCNC: 7.4 MG/DL (ref 8.4–10.2)
CHLORIDE SERPL-SCNC: 97 MMOL/L (ref 96–108)
CHLORIDE SERPL-SCNC: 98 MMOL/L (ref 96–108)
CO2 SERPL-SCNC: 20 MMOL/L (ref 21–32)
CO2 SERPL-SCNC: 24 MMOL/L (ref 21–32)
CREAT SERPL-MCNC: 5.91 MG/DL (ref 0.6–1.3)
CREAT SERPL-MCNC: 6.5 MG/DL (ref 0.6–1.3)
EOSINOPHIL # BLD MANUAL: 0 THOUSAND/UL (ref 0–0.4)
EOSINOPHIL NFR BLD AUTO: 1 % (ref 0–6)
EOSINOPHIL NFR BLD MANUAL: 0 % (ref 0–6)
ERYTHROCYTE [DISTWIDTH] IN BLOOD BY AUTOMATED COUNT: 15.1 % (ref 11.6–15.1)
ERYTHROCYTE [DISTWIDTH] IN BLOOD BY AUTOMATED COUNT: 17.3 % (ref 11.6–15.1)
EST. AVERAGE GLUCOSE BLD GHB EST-MCNC: 166 MG/DL
GLUCOSE SERPL-MCNC: 112 MG/DL (ref 65–140)
GLUCOSE SERPL-MCNC: 128 MG/DL (ref 65–140)
GLUCOSE SERPL-MCNC: 134 MG/DL (ref 65–140)
GLUCOSE SERPL-MCNC: 152 MG/DL (ref 65–140)
GLUCOSE SERPL-MCNC: 155 MG/DL (ref 65–140)
GLUCOSE SERPL-MCNC: 158 MG/DL (ref 65–140)
GRAM STN SPEC: ABNORMAL
HBA1C MFR BLD: 7.4 %
HCT VFR BLD AUTO: 21.4 % (ref 36.5–46.1)
HCT VFR BLD AUTO: 25 % (ref 36.5–46.1)
HGB BLD-MCNC: 7.4 G/DL (ref 12–15.4)
HGB BLD-MCNC: 8.6 G/DL (ref 12–15.4)
IMM GRANULOCYTES NFR BLD AUTO: 8 % (ref 0–2)
LYMPHOCYTES # BLD AUTO: 0.21 THOUSAND/UL (ref 0.6–4.47)
LYMPHOCYTES # BLD AUTO: 1 % (ref 14–44)
MAGNESIUM SERPL-MCNC: 2.1 MG/DL (ref 1.9–2.7)
MCH RBC QN AUTO: 25 PG (ref 26.8–34.3)
MCH RBC QN AUTO: 25.6 PG (ref 26.8–34.3)
MCHC RBC AUTO-ENTMCNC: 34.4 G/DL (ref 31.4–37.4)
MCHC RBC AUTO-ENTMCNC: 34.6 G/DL (ref 31.4–37.4)
MCV RBC AUTO: 72 FL (ref 82–98)
MCV RBC AUTO: 74 FL (ref 82–98)
MONOCYTES # BLD AUTO: 0.83 THOUSAND/UL (ref 0–1.22)
MONOCYTES NFR BLD AUTO: 2 % (ref 4–12)
MONOCYTES NFR BLD: 4 % (ref 4–12)
NEUTROPHILS # BLD MANUAL: 19.61 THOUSAND/UL (ref 1.85–7.62)
NEUTS BAND NFR BLD MANUAL: 5 % (ref 0–8)
NEUTS SEG NFR BLD AUTO: 89 % (ref 43–75)
NEUTS SEG NFR BLD AUTO: 90 % (ref 43–75)
OVALOCYTES BLD QL SMEAR: PRESENT
PATHOLOGY REVIEW: YES
PHOSPHATE SERPL-MCNC: 4.7 MG/DL (ref 2.3–4.1)
PLATELET # BLD AUTO: 87 THOUSANDS/UL (ref 149–390)
PLATELET BLD QL SMEAR: ABNORMAL
PMV BLD AUTO: 11.4 FL (ref 8.9–12.7)
POIKILOCYTOSIS BLD QL SMEAR: PRESENT
POTASSIUM SERPL-SCNC: 4.2 MMOL/L (ref 3.5–5.3)
POTASSIUM SERPL-SCNC: 4.6 MMOL/L (ref 3.5–5.3)
RBC # BLD AUTO: 2.96 MILLION/UL (ref 3.88–5.12)
RBC # BLD AUTO: 3.36 MILLION/UL (ref 3.88–5.12)
RBC MORPH BLD: PRESENT
SODIUM SERPL-SCNC: 130 MMOL/L (ref 135–147)
SODIUM SERPL-SCNC: 131 MMOL/L (ref 135–147)
TACROLIMUS BLD-MCNC: 6.7 NG/ML (ref 3–15)
TARGETS BLD QL SMEAR: PRESENT
TOXIC GRANULES BLD QL SMEAR: PRESENT
WBC # BLD AUTO: 13.3 THOUSAND/UL (ref 4.31–10.16)
WBC # BLD AUTO: 20.64 THOUSAND/UL (ref 4.31–10.16)
WBC TOXIC VACUOLES BLD QL SMEAR: PRESENT

## 2023-11-02 PROCEDURE — 80197 ASSAY OF TACROLIMUS: CPT

## 2023-11-02 PROCEDURE — 99233 SBSQ HOSP IP/OBS HIGH 50: CPT | Performed by: INTERNAL MEDICINE

## 2023-11-02 PROCEDURE — 99232 SBSQ HOSP IP/OBS MODERATE 35: CPT | Performed by: PHYSICIAN ASSISTANT

## 2023-11-02 PROCEDURE — 92610 EVALUATE SWALLOWING FUNCTION: CPT

## 2023-11-02 PROCEDURE — 82948 REAGENT STRIP/BLOOD GLUCOSE: CPT

## 2023-11-02 PROCEDURE — 84100 ASSAY OF PHOSPHORUS: CPT

## 2023-11-02 PROCEDURE — 83036 HEMOGLOBIN GLYCOSYLATED A1C: CPT | Performed by: PHYSICIAN ASSISTANT

## 2023-11-02 PROCEDURE — 83735 ASSAY OF MAGNESIUM: CPT

## 2023-11-02 PROCEDURE — 80048 BASIC METABOLIC PNL TOTAL CA: CPT

## 2023-11-02 PROCEDURE — 85025 COMPLETE CBC W/AUTO DIFF WBC: CPT

## 2023-11-02 PROCEDURE — 90935 HEMODIALYSIS ONE EVALUATION: CPT | Performed by: INTERNAL MEDICINE

## 2023-11-02 RX ORDER — HYDROMORPHONE HCL IN WATER/PF 6 MG/30 ML
0.2 PATIENT CONTROLLED ANALGESIA SYRINGE INTRAVENOUS EVERY 4 HOURS PRN
Status: DISCONTINUED | OUTPATIENT
Start: 2023-11-02 | End: 2023-11-03

## 2023-11-02 RX ORDER — ALBUMIN (HUMAN) 12.5 G/50ML
12.5 SOLUTION INTRAVENOUS ONCE
Status: COMPLETED | OUTPATIENT
Start: 2023-11-02 | End: 2023-11-02

## 2023-11-02 RX ORDER — SODIUM BICARBONATE 650 MG/1
1300 TABLET ORAL
Status: DISCONTINUED | OUTPATIENT
Start: 2023-11-02 | End: 2023-11-03 | Stop reason: HOSPADM

## 2023-11-02 RX ADMIN — INSULIN LISPRO 1 UNITS: 100 INJECTION, SOLUTION INTRAVENOUS; SUBCUTANEOUS at 07:49

## 2023-11-02 RX ADMIN — ALBUMIN HUMAN 12.5 G: 0.25 SOLUTION INTRAVENOUS at 10:06

## 2023-11-02 RX ADMIN — HEPARIN SODIUM 5000 UNITS: 5000 INJECTION INTRAVENOUS; SUBCUTANEOUS at 21:55

## 2023-11-02 RX ADMIN — ACETAMINOPHEN 975 MG: 325 TABLET, FILM COATED ORAL at 14:34

## 2023-11-02 RX ADMIN — TACROLIMUS 1 MG: 1 CAPSULE ORAL at 14:33

## 2023-11-02 RX ADMIN — HEPARIN SODIUM 5000 UNITS: 5000 INJECTION INTRAVENOUS; SUBCUTANEOUS at 05:23

## 2023-11-02 RX ADMIN — Medication 2.5 MG: at 14:30

## 2023-11-02 RX ADMIN — HYDROMORPHONE HYDROCHLORIDE 0.2 MG: 0.2 INJECTION, SOLUTION INTRAMUSCULAR; INTRAVENOUS; SUBCUTANEOUS at 21:55

## 2023-11-02 RX ADMIN — CEFEPIME 1000 MG: 1 INJECTION, POWDER, FOR SOLUTION INTRAMUSCULAR; INTRAVENOUS at 13:04

## 2023-11-02 NOTE — NURSING NOTE
Patient refused all medications today, explanation provided to family/patient. Attempted to feed/provide refreshment to patient, he refused. Attempted to mouth swab patient and he gently pushed my hand away. Provider notified of his change in demeanor, family wanted to explore hospice intervention provider at bedside.

## 2023-11-02 NOTE — QUICK NOTE
Patient is being adequately drained with nephrostomy tube and Mcallister catheter. No further need for urological intervention.   Urology will sign off at this time

## 2023-11-02 NOTE — HOSPICE NOTE
Hospice referral received. Requested not to reach out to family until after family meeting tomorrow. Liaison continue to follow.

## 2023-11-02 NOTE — PROGRESS NOTES
[address] [facility]  Progress Note  Name: Kendal Falcon I  MRN: [id number]  Unit/Bed#: [facility] 691-77 I Date of Admission: 10/23/2023   Date of Service: 11/2/2023 I Hospital Day: 10  DOS: 11/2/2023  Assessment/Plan   * Septic shock [facility])  Assessment & Plan  Pt initially presented with multiple falls, found to have MSSA bacteremia, went into septic shock secondary to UTI with ascending infection with persistent hypotension requiring pressor support and MICU admission on 10/30  S/p right PCN placement by IR on 10/31   Flush daily on discharge, with 6 week right ureteral stent removal and PCN exchange outpatient  ID following, continue IV Cefepime for now  Follow gram stain, currently with GNR and staph  Repeat blood cultures negative to date   S/p port removal this admission as well   Pt stable off pressor support and transitioned to med/surg level of care on 11/2   White count improving   Pt is afebrile and hemodynamically stable, however mostly non verbal today, appears uncomfortable and refusing any medications or food/water even for family members. Upon discussion at bedside today with patient's daughter, significant other and brother, they are all in agreement to pursue hospice at this point. I did discuss that since patient is now on dialysis if we would opt for hospice that would mean dialysis would be stopped and transition could be very quickly in that instance. Pt's family members acknowledged this and continue to be in agreement. Hospice consult was placed. Later in the day, additional family members at bedside with questions regarding continued medical care vs. Hospice. Will attempt to have family meeting tomorrow for goals of care discussions, in the interim will continue with medical management until further goals are clarified, hold hospice consult for now. Consideration for palliative care consult pending further clinical course.  Will add PRN dilaudid IV 0.2 mg for severe pain if needed. Acute on chronic renal failure   Assessment & Plan  History of CKD with baseline creatinine of 3.3-3.6  TAVON on CKD this admission likely in setting of ATN, UTI, MSSA bacteremia, hypotension and right sided hydronephrosis   S/p right PCN tube by IR on 10/31  Nephrology following,  Temp HD catheter placed on 11/1 by ICU team   Pt with hyperkalemia and peak cr. Of 7.5, plans to start HD today, 11/2  Family meeting with goals of care discussions tomorrow regarding ongoing HD.  Continues to be level 3 DNR/DNI status currently  Avoid nephrotoxic agents and relative hypotension    MSSA bacteremia  Assessment & Plan  Plan as above  Likely in setting of UTI, ascending renal infection   ID following,  Continue IV Cefepime   Following IR cultures   Will need a 4 week course of abx total from [facility] clearance pending continued discussion with goals of care  Repeat cultures are negative growth to date   Port removed this admission   Urology evaluated, will need JJ stent exchange at later date    Anemia  Assessment & Plan  Chronic anemia likely due to CKD and underlying infection   S/p 2 U PRBC total this admission, last given on 10/31   Hgb 8.6 today, stable   No evidence of acute bleeding noted    History of CVA (cerebrovascular accident)  Assessment & Plan  Remote history of CVA  Continue Plavix 75 mg daily and statin    Thrombocytopenia (HCC)  Assessment & Plan  Lab Results   Component Value Date    PLT 67 (L) 11/01/2023     Chronic on review, acutely decreased here   Likely in setting of MSSA bacrteremia, TAVON on CKD requiring dialysis   Most recent platelets 67, repeat level this AM with clumped platelets, unable to get accurate count, repeat CBC in the AM  No evidence of acute or active bleeding at this time  Monitor closely     UTI (urinary tract infection)  Assessment & Plan  History of UTIs, this admission, concern for ascending UTI with subsequent MSSA bacteremia and septic shock as noted above   CT scan on admission with moderate right hydronephrosis and bladder wall thickening  ID following,  Continue IV cefepime for now   Follow up IR cultures and tailor abx as appropriately   Will need a 4 week total course of abx from [facility] clearance pending goals conversation as above  Prior CT scan reviewed this similar ureteritis findings however cystitis appears to be new  Repeat blood cultures are negative for growth to date after initial positive cultures  Uine culture is positive for alpha step and MSSA  Monitor temps, WBCs      T2DM (type 2 diabetes mellitus) ([address])  Assessment & Plan  No results found for: "HGBA1C"    Recent Labs     11/01/23  0727 11/01/23  1207 11/01/23  1712 11/02/23  0741   POCGLU 163* 216* 159* 158*         Blood Sugar Average: Last 72 hrs:  (P) [id number]  Repeat hgbA1c while inpatient   Not maintained on any oral agents outpatient  Continue SSI while inpatient  Monitor glucose closely  Prandial and basal insulin added    History of liver transplant ([address] )  Assessment & Plan  History of liver transplant secondary to alcoholic cirrhosis approximately 20 years ago   Continue tacrolimus 1 mg every 12 hours  Tacrolimus level pending from this AM, follow up results   Follow-up with GI outpatient    Bladder cancer 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 [facility])  Assessment & Plan  History of stage IV bladder tumor status post resection and radiation with metastasis to the lungs  TURBT in 2022 with internalized stent  Not a candidate for further therapy cancer directed treatment  No pain currently  Supportive care  Continue nortriptyline and Lyrica    Fall  Assessment & Plan  Pt presented after multiple falls at home   Likely in setting of sepsis, MSSA bacteremia, hypotension, hydronephrosis   Plan as above   PT/OT consultations            VTE Pharmacologic Prophylaxis: VTE Score: 6 High Risk (Score >/= 5) - Pharmacological DVT Prophylaxis Ordered: heparin. Sequential Compression Devices Ordered.     Patient Centered Rounds: I performed bedside rounds with nursing staff today. Ignacio Motta  Discussions with Specialists or Other Care Team Provider: Discussed with RN, CM, ID, nephrology and reviewed previous notes     Education and Discussions with Family / Patient: Updated  (multiple family members, significant other, daughter, brother, son) at bedside. Total Time Spent on Date of Encounter in care of patient: 70 mins. This time was spent on one or more of the following: performing physical exam; counseling and coordination of care; obtaining or reviewing history; documenting in the medical record; reviewing/ordering tests, medications or procedures; communicating with other healthcare professionals and discussing with patient's family/caregivers. Current Length of Stay: 10 day(s)  Current Patient Status: Inpatient   Certification Statement: The patient will continue to require additional inpatient hospital stay due to goals of care discussions tomorrow, HD, IV abx, bacteremia  Discharge Plan: Anticipate discharge in >72 hrs to TBD pending clinical course     Code Status: Level 3 - DNAR and DNI    Subjective:   Pt without complaints, withdrawn today. Family stating that he will not eat or drink anything, refused his medications this morning. Objective:     Vitals:   Temp (24hrs), Av.8 °F (36.6 °C), Min:97.3 °F (36.3 °C), Max:98.6 °F (37 °C)    Temp:  [97.3 °F (36.3 °C)-98.6 °F (37 °C)] 98.6 °F (37 °C)  HR:  [80-93] 86  Resp:  [15-29] 17  BP: ()/() 128/78  SpO2:  [94 %-99 %] 94 %  Body mass index is 19.17 kg/m². Input and Output Summary (last 24 hours): Intake/Output Summary (Last 24 hours) at 2023 1633  Last data filed at 2023 1100  Gross per 24 hour   Intake 845.98 ml   Output 880 ml   Net -34.02 ml       Physical Exam:   Physical Exam  Vitals reviewed. Constitutional:       General: Milena Valentine is not in acute distress.      Comments: Pt is in no acute distress lying in his hospital bed resting, opens eyes to verbal stimuli   HENT:      Head: Normocephalic. Cardiovascular:      Rate and Rhythm: Normal rate and regular rhythm. Pulmonary:      Effort: No respiratory distress. Breath sounds: Normal breath sounds. Abdominal:      General: Bowel sounds are normal. There is no distension. Palpations: Abdomen is soft. Tenderness: There is no abdominal tenderness. Skin:     General: Skin is warm and dry. Neurological:      Mental Status: Ivan Dominique is alert. Additional Data:     Labs:  Results from last 7 days   Lab Units 11/02/23  0817 11/01/23  0550 10/31/23  0640 10/31/23  0456   WBC Thousand/uL 13.30* 17.62*   < > 14.80*   HEMOGLOBIN g/dL 8.6* 9.5*   < > 6.9*   HEMATOCRIT % 25.0* 28.8*   < > 20.9*   PLATELETS Thousands/uL  --  67*   < > 66*   BANDS PCT %  --   --   --  29*   NEUTROS PCT % 89*  --   --   --    LYMPHO PCT %  --   --   --  1*   MONOS PCT % 2*  --   --   --    MONO PCT %  --   --   --  1*   EOS PCT % 1  --   --  1    < > = values in this interval not displayed.      Results from last 7 days   Lab Units 11/02/23  0817   SODIUM mmol/L 131*   POTASSIUM mmol/L 4.2   CHLORIDE mmol/L 97   CO2 mmol/L 24   BUN mg/dL 71*   CREATININE mg/dL 6.50*   ANION GAP mmol/L 10   CALCIUM mg/dL 7.4*   GLUCOSE RANDOM mg/dL 152*     Results from last 7 days   Lab Units 10/30/23  0900   INR  1.27*     Results from last 7 days   Lab Units 11/02/23  1207 11/02/23  0741 11/01/23  1712 11/01/23  1207 11/01/23  0727 10/31/23  1552 10/31/23  1042 10/31/23  0614 10/30/23  1659 10/30/23  1124 10/30/23  0846 10/30/23  0758   POC GLUCOSE mg/dl 112 158* 159* 216* 163* 97 136 114 115 88 98 56*     Results from last 7 days   Lab Units 11/02/23  0817   HEMOGLOBIN A1C % 7.4*     Results from last 7 days   Lab Units 10/31/23  0456 10/30/23  1414 10/30/23  1102 10/30/23  0410 10/29/23  0553   LACTIC ACID mmol/L  --  1.1 2.7* 1.6  --    PROCALCITONIN ng/ml 119.68*  --   --  49.65* 56.62*       Lines/Drains:  Invasive Devices       Peripheral Intravenous Line  Duration             Peripheral IV 10/30/23 Left;Upper;Ventral (anterior) Arm 3 days              Hemodialysis Catheter  Duration             HD Temporary Double Catheter 10 days              Drain  Duration             Urethral Catheter Temperature probe 16 Fr. 3 days    Nephrostomy Right 10.2 Fr. 2 days                  Urinary Catheter:  Goal for removal: N/A- Discharging with Mcallister               Imaging: Reviewed radiology reports from this admission including: ultrasound(s)    Recent Cultures (last 7 days):   Results from last 7 days   Lab Units 10/31/23  1504 10/30/23  1522 10/30/23  1507 10/30/23  1102 10/28/23  0603 10/27/23  1620   BLOOD CULTURE   --  No Growth at 48 hrs. No Growth at 48 hrs. --  No Growth After 5 Days. No Growth After 5 Days. No Growth After 5 Days.    GRAM STAIN RESULT  4+ Gram negative rods*  3+ Gram positive cocci in pairs*  3+ Polys*  --   --   --   --   --    URINE CULTURE   --   --   --  10,000-19,000 cfu/ml  --   --    BODY FLUID CULTURE, STERILE  3+ Growth of Staphylococcus aureus*  3+ Growth of Streptococcus anginosus*  --   --   --   --   --        Last 24 Hours Medication List:   Current Facility-Administered Medications   Medication Dose Route Frequency Provider Last Rate    acetaminophen  975 mg Oral Q6H PRN Charisse Joyce, DO      atorvastatin  80 mg Oral Daily With [employer] Depope, DO      cefepime  1,000 mg Intravenous Q24H Charisse Joyce, DO 1,000 mg (11/02/23 1304)    chlorhexidine  15 mL Mouth/Throat Q12H [facility] Charisse Joyce, DO      clopidogrel  75 mg Oral Daily Charisse Joyce, DO      dextromethorphan-guaiFENesin  10 mL Oral Q4H PRN Charisse Joyce, DO      docusate sodium  100 mg Oral BID Charisse Joyce DO      heparin (porcine)  5,000 Units Subcutaneous [facility] hCarisse Joyce, DO      HYDROmorphone  0.2 mg Intravenous Q4H PRN Sharon Lynch PA-C      insulin lispro  1-5 Units Subcutaneous TID AC Charisse Joyce, DO      meclizine  25 mg Oral Q8H PRN Bharathi Joyce, DO      nortriptyline  10 mg Oral HS Charisse Joyce, DO      ondansetron  4 mg Intravenous Q4H PRN Charisse Joyce, DO      oxybutynin  5 mg Oral BID Charisse Joyce, DO      oxyCODONE  2.5 mg Oral Q4H PRN Han Nelson MD      pantoprazole  40 mg Oral Daily Charisse Joyce, DO      pregabalin  75 mg Oral BID Charisse Joyce, DO      senna  2 tablet Oral Once Bharathi Joyce, DO      sodium bicarbonate  1,300 mg Oral BID after meals Mukund Lopez MD      sucralfate  1 g Oral 4x Daily (AC & HS) Bharathi Nunez Depope, DO      tacrolimus  1 mg Oral Q12H [facility] Depope, DO      temazepam  15 mg Oral HS PRN Marisabel Mendez DO          Today, Patient Was Seen By: Javid Buchanan PA-C    **Please Note: This note may have been constructed using a voice recognition system. **

## 2023-11-02 NOTE — NURSING NOTE
Patient in dialysis, noticed he is hypotensive. ..contacted Alissa Shannon RN to see if he would give the ordered Albumin as ordered.

## 2023-11-02 NOTE — SPEECH THERAPY NOTE
Speech-Language Pathology Bedside Swallow Evaluation    Patient Name: Gloria Vail    [id number] Date: 11/2/2023     Problem List  Principal Problem:    Septic shock [facility])  Active Problems:    Fall    Bladder cancer 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 [facility])    History of liver transplant ([address] [facility])    T2DM (type 2 diabetes mellitus) ([facility])    Acute on chronic renal failure     UTI (urinary tract infection)    Thrombocytopenia (HCC)    History of CVA (cerebrovascular accident)    Anemia    MSSA bacteremia      Past Medical History  Past Medical History:   Diagnosis Date    Cirrhosis ([facility])     Dementia ([facility])        Past Surgical History  Past Surgical History:   Procedure Laterality Date    IR NEPHROSTOMY TUBE PLACEMENT  10/31/2023    IR PORT REMOVAL  10/30/2023     Summary  Pt presented with s/s suggestive of mild-mod oropharyngeal dysphagia. Symptoms or concerns included decreased bolus acceptance and decreased bolus propulsion as well as suspected pharyngeal swallow delay and suspected decreased hyolaryngeal elevation upon palpation. No s/s aspiration with the trials offered, pt accepted only a few sips of water and a few small bites of apple sauce. Risk/s for Aspiration: mild    Recommended Diet: puree/level 1 diet and thin liquids   Recommended Form of Meds: whole with liquid   Aspiration precautions and swallowing strategies: upright posture, only feed when fully alert, slow rate of feeding, small bites/sips, and alternating bites and sips  Other Recommendations: Continue frequent oral care        Current Medical Status per H&P 10/23/23  Gloria Vail is a 74 y.o. adult with a PMH of metastatic bladder cancer to the lungs, history of liver transplant on tacrolimus, dementia, vertigo on meclizine prn, CKD refusing dialysis, history of hepatitis C, history of CVA who presents after multiple falls. Trauma work-up negative. Unknown head strike although patient denies. Patient had 2 falls yesterday and one fall today prior to admission. Patient reports has been feeling dizzy for the last several days. Wife reports that patient has not been eating well and has not been drinking enough water. He also has been complaining of dysuria to the family but currently denies any dysuria. Patient reports that he was unable to get up on his own and one of his neighbors help get him up. He reports he was on the floor for a while until his daughter was able to get help. Currently patient denies any other complaints. Special Studies:  CXR 11/1/23 IMPRESSION:  Again seen are soft tissue like masses bilaterally. CT chest with contrast is recommended. Mild congestive changes. Placement of right neck dialysis catheter in the distal SVC. Social/Education/Vocational Hx:  Pt lives with family    Swallow Information   Current Risks for Dysphagia & Aspiration: AMS  Current Diet: regular diet and thin liquids   Baseline Diet: regular diet and thin liquids      Baseline Assessment   Behavior/Cognition: waxing and waning arousal level  Speech/Language Status: able to participate in basic conversation and able to follow commands inconsistently  Patient Positioning: upright in bed  Pain Status/Interventions/Response to Interventions: RN aware of pt's complaints of pain     Swallow Mechanism Exam  Facial: symmetrical  Labial: decreased strength  Lingual: decreased coordination  Velum: symmetrical  Mandible: adequate ROM  Dentition: full dentures  Vocal quality:weak   Volitional Cough: weak   Respiratory Status: on 2L O2       Consistencies Assessed and Performance   Consistencies Administered: thin liquids and puree    Oral Stage: mild-moderate, decreased bolus acceptance, and decreased bolus propulsion. Pharyngeal Stage: mild-moderate, suspected pharyngeal swallow delay, and suspected decreased hyolaryngeal elevation upon palpation. No coughing, throat clearing, change in vocal quality or respiratory status noted today.      Esophageal Concerns: none reported      Summary and Recommendations (see above)    Results Reviewed with: patient, RN, MD, and family     Treatment Recommended: yes     Frequency of treatment: 2-3x/week    Dysphagia LTG  -Patient will demonstrate safe and effective oral intake (without overt s/s significant oral/pharyngeal dysphagia including s/s penetration or aspiration) for the highest appropriate diet level. Short Term Goals:  -Pt will tolerate Dysphagia 1/pureed diet and thin liquid with no significant s/s oral or pharyngeal dysphagia across 1-3 diagnostic sessions.    -Patient will tolerate trials of upgraded food and/or liquid texture with no significant s/s of oral or pharyngeal dysphagia including aspiration across 1-3 diagnostic sessions.

## 2023-11-02 NOTE — PROGRESS NOTES
Progress Note - Infectious Disease   Carol Robledo 74 y.o. adult MRN: [id number]  Unit/Bed#: [facility] 614-01 Encounter: [id number]      Impression/Plan:     1. Ascending urinary tract infection. In the setting of #5 below and internal right ureteral stent. Patient with reported dysuria prior to admission, UA with innumerable white blood cells. Urine culture is growing MSSA and there is low colony count of alpha hemolytic strep. CT A/P shows moderate right hydronephrosis, right urothelial thickening and enhancement and bladder wall thickening concerning for a sending ureteritis and cystitis. Patient initially refused felix catheter but then agreeable after decompensation and transfer to the ICU 10/30. Repeat blood cultures show no growth, Ucx mixed contaminants. Decompensation likely due to lack of source control in setting of persistent hydronephrosis and retained Port-A-Cath rather than more resistant infection. Now status post right PCN placement 10/31 with purulent fluid drained. Gram stain with GNRs, GPCs in pairs. Culture so far with growth of Staph aureus.              -Continue renally dosed IV cefepime for now pending IR cultures.              -Follow-up final IR cultures and tailor antibiotics accordingly              -Urology follow-up   -IR input noted with eventual plan for right ureteral stent removal and routine PCN exchange in 6 weeks. 2.  MSSA bacteremia. Due to #1 above. While Staphylococcus aureus isolated in the urine is likely due to seeding from a bloodstream infection, in this case a UTI seems to be the primary cause of his bacteremia. Risk factors for isolation of staph in the urine include prior urinary tract manipulation and his chronic stent. He has no open skin wounds or signs of SSTI. Port has been removed. TTE no vegetations.  Repeat blood cultures are negative.              -Antibiotic plan as above.              -follow up repeat blood cultures               -Anticipate a 4-week total course of antibiotics from blood culture clearance for this issue     3. Sepsis, evolving since admission with fever and tachycardia, now hypotension 10/30 requiring pressors. Due to #1 and 2 above. Also consider contribution of malignancy due to worsening pul continue IV cefepime for now pending IR cultures. Monary metastatic disease. Suspect decompensation is more due to a lack of source control in the setting of retained Port-A-Cath and persistent right hydronephrosis (patient declined Mcallister catheter earlier in the admission) rather than new or drug-resistant infection. Status post right PCN placement 10/31 with infected appearing fluid drained              -Antibiotic plan as above. -Monitor fever curve, white blood cell count     4. 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 Likely secondary to ATN and hydronephrosis. Creatinine now worsening due to recurrent fever and hypotension. Patient was started on HD. He may require HD indefinitely. -Nephrology follow-up ongoing              -Dose adjust antibiotic with HD for now.   -Okay to proceed with PermCath next week if most recent blood cultures remain negative. 5.  Advanced bladder cancer status post TURBT May 8668 complicated by right ureteral obstruction status post right internal ureteral stent. The patient has metastatic disease to the lungs. He previously underwent radiation and chemotherapy but is no longer on cancer directed therapy.              -Urology follow-up              -Palliative care follow-up outpatient     6. History of liver transplant in 2002. Due to alcohol abuse and hepatitis C. Patient currently on tacrolimus. 7.  Type 2 diabetes mellitus with hyperglycemia. Recent hemoglobin A1c 7.3%. This is a risk factor for infection. Glycemic control per primary. 8.  Thrombocytopenia, chronic. Monitor platelets     Antibiotics:  Abx day 11  Cefepime     I discussed above plan with nephrology service. Subjective:  Temporary HD catheter was placed and patient was started on hemodialysis. He was transferred out of ICU. No fevers. Stable blood pressures. Currently receiving HD. Objective:  Vitals:  Temp:  [97.3 °F (36.3 °C)-97.9 °F (36.6 °C)] 97.9 °F (36.6 °C)  HR:  [80-92] 88  Resp:  [14-33] 18  BP: ()/() 130/81  SpO2:  [89 %-100 %] 94 %  Temp (24hrs), Av.5 °F (36.4 °C), Min:97.3 °F (36.3 °C), Max:97.9 °F (36.6 °C)  Current: Temperature: 97.9 °F (36.6 °C)    Physical Exam:   General:  No acute distress, nontoxic  HEENT: Atraumatic normocephalic  Neck: Trachea midline  Psychiatric:  Awake and alert  Pulmonary:  Normal respiratory excursion without accessory muscle use  Abdomen: No rigidity or guarding  Right PCN in place  Extremities:  No edema  Skin:  No rashes  Neuro: Moves all extremities spontaneously    Lab Results:  I have personally reviewed pertinent labs. Results from last 7 days   Lab Units 23  0817 23  0017 23  1719   POTASSIUM mmol/L 4.2 4.6 4.2   CHLORIDE mmol/L 97 98 96   CO2 mmol/L 24 20* 22   BUN mg/dL 71* 66* 60*   CREATININE mg/dL 6.50* 5.91* 5.20*   CALCIUM mg/dL 7.4* 7.3* 7.5*     Results from last 7 days   Lab Units 23  0817 23  0550 10/31/23  1548   WBC Thousand/uL 13.30* 17.62* 15.04*   HEMOGLOBIN g/dL 8.6* 9.5* 8.5*   PLATELETS Thousands/uL 69* 67* 67*     Results from last 7 days   Lab Units 10/31/23  1504 10/30/23  1522 10/30/23  1507 10/30/23  1102 10/30/23  0445 10/28/23  0603 10/27/23  1620   BLOOD CULTURE   --  No Growth at 48 hrs. No Growth at 48 hrs.  --   --  No Growth After 4 Days. No Growth After 4 Days. No Growth After 5 Days.    GRAM STAIN RESULT  4+ Gram negative rods*  3+ Gram positive cocci in pairs*  3+ Polys*  --   --   --   --   --   --    URINE CULTURE   --   --   --  10,000-19,000 cfu/ml  --   --   --    BODY FLUID CULTURE, STERILE  3+ Growth of Staphylococcus aureus*  --   --   --   --   --   --    MRSA CULTURE ONLY   --   --   --   --  No Methicillin Resistant Staphlyococcus aureus (MRSA) isolated  --   --        Imaging Studies:   I have personally reviewed pertinent imaging study reports and images in PACS. Renal ultrasound with no right-sided hydronephrosis post PCN placement. EKG, Pathology, and Other Studies:   I have personally reviewed pertinent reports.

## 2023-11-02 NOTE — CASE MANAGEMENT
Case Management Discharge Planning Note    Patient name Ana Barnes  [facility] [id number]  : 1950 Date 2023       Current Admission Date: 10/23/2023  Current Admission Diagnosis:Septic shock [facility])   Patient Active Problem List    Diagnosis Date Noted    Septic shock ([address]) 10/30/2023    MSSA bacteremia 10/26/2023    Fall 10/23/2023    Bladder cancer 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 ([address]) 10/23/2023    Insomnia 10/23/2023    History of liver transplant ([address]) 10/23/2023    T2DM (type 2 diabetes mellitus) ([address]) 10/23/2023    Acute on chronic renal failure  10/23/2023    UTI (urinary tract infection) 10/23/2023    Dementia ([address]) 10/23/2023    CKD (chronic kidney disease) 10/23/2023    Thrombocytopenia ([address]) 10/23/2023    History of CVA (cerebrovascular accident) 10/23/2023    Anemia 10/23/2023      LOS (days): 10  Geometric Mean LOS (GMLOS) (days): 3.90  Days to GMLOS:-6.1     OBJECTIVE:  Risk of Unplanned Readmission Score: 31.78         Current admission status: Inpatient   Preferred Pharmacy:   UNKNOWN - FOLLOW UP PRIOR TO DISCHARGE TO E-PRESCRIBE  No address on file      Primary Care Provider: Ariadna Lopez DO    Primary Insurance: [employer] [address] [place] [address]  Secondary Insurance:     DISCHARGE DETAILS:    Discharge planning discussed with[de-identified] Daughter, son, S/O  Freedom of Choice: Yes  Comments - Freedom of Choice: Discussed FOC  CM contacted family/caregiver?: Yes     Other Referral/Resources/Interventions Provided:  Referral Comments: Referral requested for  hospice, entered in  [facility].   Potential family meeting tomorrow 11/3/23 to discuss  [facility]

## 2023-11-02 NOTE — APP STUDENT NOTE
Fall   Presented with multiple falls at home   PT/OT  Urinary tract infection   CT abdomen and pelvis w IV contrast:   "Redemonstration of right renal atrophy and moderate right hydronephrosis with a double-J right ureteral stent. There is mild diffuse right urothelial thickening and enhancement. There is also mild diffuse thickening of the urinary bladder and adjacent fat stranding. This may be explained by an infectious etiology including right ureteritis and cystitis."  "Skin thickening and/or subcutaneous edema in the periumbilical area may be due to history of trauma."  Chronic hx of UTI   UTI on admission concerning for ascending UTI with MSSA bacteremia and septic shock   Continue IV cefepime per ID   Urology following, will need stent exchange in future   MSSA bacteremia  Septic shock  Patient presented with numerous falls at home and was found to have UTI with ascending infection that caused septic shock refractory to fluids requiring pressors in MICU on 10/30.   S/p right PCN placement by IR on 10/31   Continue IV Cefepime per ID  Gram stain currently GNR and GPC in pairs, IR cultures pending  Repeat blood cultures negative   S/p port removal during admission  Transferred to medical/surgical floor 11/02, off pressors, currently stable  White count 13.30 this am from 17.62 on 11/1  Patient remains afebrile and hemodynamically stable  Anemia   Likely secondary to CKD   Patient received  PRBC x2, on 10/25 & 10/31   Hgb 8.5 from 9.5   Thrombocytopenia   Chronic however acute decrease during admission   Platelet count 67   Repeat CBC, am CBC not adequate due to platelet clumping   Trend CBC   Bladder cancer 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  History of stage IV bladder tumor status post resection and radiation with metastasis to the lungs  S/p TURBT in 2022 with internalized stent  No longer a candidate for further therapy cancer treatment  Continue nortriptyline, pregabalin, and supportive measures  Follows up with palliative care outpatient  Acute on chronic renal failure   Baseline Cr 3.3-.36   Today 6.50 from 5.91, peak level 7.5   Temporary HD catheter placed    S/p PCN tube 10/31 by IR   Nephrology following    Trend BMP   Avoid nephrotoxic agents and hypotension   Type 2 diabetes mellitus   Continue SSI   A1C pending   Sugars mildly elevated  and , overall controlled  History of CVA   On Plavix 75 mg and atorvastatin 10 mg  History of liver transplant   Secondary to alcoholic cirrhosis 20 years ago   Continue Tacrolimus 1 mg Q12 hours   Tacrolimus level pending     VTE Pharmacologic Prophylaxis: VTE Score: 6 {VTE Risk:07610}    Patient Centered Rounds: {Pt Centered Care Rounds:73261}  Discussions with Specialists or Other Care Team Provider: ***    Education and Discussions with Family / Patient: {Family Communication:02232}    Total Time Spent on Date of Encounter in care of patient: *** mins. This time was spent on one or more of the following: performing physical exam; counseling and coordination of care; obtaining or reviewing history; documenting in the medical record; reviewing/ordering tests, medications or procedures; communicating with other healthcare professionals and discussing with patient's family/caregivers. Current Length of Stay: 10 day(s)  Current Patient Status: Inpatient   Certification Statement: {Certification [id number]:56342}  Discharge Plan: {Discharge [facility]:79835}    Code Status: Level 3 - DNAR and DNI    Subjective:   ***    Objective:     Vitals:   Temp (24hrs), Av.5 °F (36.4 °C), Min:97.3 °F (36.3 °C), Max:97.9 °F (36.6 °C)    Temp:  [97.3 °F (36.3 °C)-97.9 °F (36.6 °C)] 97.6 °F (36.4 °C)  HR:  [80-93] 89  Resp:  [14-33] 18  BP: ()/() 114/64  SpO2:  [89 %-100 %] 94 %  Body mass index is 19.17 kg/m². Input and Output Summary (last 24 hours):      Intake/Output Summary (Last 24 hours) at 2023 1131  Last data filed at 2023 1100  Gross per 24 hour Intake 1671.87 ml   Output 1104 ml   Net 567.87 ml       Physical Exam:   Physical Exam ***    Additional Data:     Labs:  Results from last 7 days   Lab Units 11/02/23  0817 11/01/23  0550 10/31/23  0640 10/31/23  0456   WBC Thousand/uL 13.30* 17.62*   < > 14.80*   HEMOGLOBIN g/dL 8.6* 9.5*   < > 6.9*   HEMATOCRIT % 25.0* 28.8*   < > 20.9*   PLATELETS Thousands/uL  --  67*   < > 66*   BANDS PCT %  --   --   --  29*   NEUTROS PCT % 89*  --   --   --    LYMPHO PCT %  --   --   --  1*   MONOS PCT % 2*  --   --   --    MONO PCT %  --   --   --  1*   EOS PCT % 1  --   --  1    < > = values in this interval not displayed.      Results from last 7 days   Lab Units 11/02/23  0817   SODIUM mmol/L 131*   POTASSIUM mmol/L 4.2   CHLORIDE mmol/L 97   CO2 mmol/L 24   BUN mg/dL 71*   CREATININE mg/dL 6.50*   ANION GAP mmol/L 10   CALCIUM mg/dL 7.4*   GLUCOSE RANDOM mg/dL 152*     Results from last 7 days   Lab Units 10/30/23  0900   INR  1.27*     Results from last 7 days   Lab Units 11/02/23  0741 11/01/23  1712 11/01/23  1207 11/01/23  0727 10/31/23  1552 10/31/23  1042 10/31/23  0614 10/30/23  1659 10/30/23  1124 10/30/23  0846 10/30/23  0758 10/29/23  2108   POC GLUCOSE mg/dl 158* 159* 216* 163* 97 136 114 115 88 98 56* 241*         Results from last 7 days   Lab Units 10/31/23  0456 10/30/23  1414 10/30/23  1102 10/30/23  0410 10/29/23  0553   LACTIC ACID mmol/L  --  1.1 2.7* 1.6  --    PROCALCITONIN ng/ml 119.68*  --   --  49.65* 56.62*       Lines/Drains:  Invasive Devices       Peripheral Intravenous Line  Duration             Peripheral IV 10/30/23 Left;Upper;Ventral (anterior) Arm 3 days              Hemodialysis Catheter  Duration             HD Temporary Double Catheter 10 days              Drain  Duration             Urethral Catheter Temperature probe 16 Fr. 3 days    Nephrostomy Right 10.2 Fr. 1 day                  Urinary Catheter:  Goal for removal: {Mcallister Removal Goal:86142}               Imaging: {[facility]668[facility]}    Recent Cultures (last 7 days):   Results from last 7 days   Lab Units 10/31/23  1504 10/30/23  1522 10/30/23  1507 10/30/23  1102 10/28/23  0603 10/27/23  1620   BLOOD CULTURE   --  No Growth at 48 hrs. No Growth at 48 hrs. --  No Growth After 4 Days. No Growth After 4 Days. No Growth After 5 Days.    GRAM STAIN RESULT  4+ Gram negative rods*  3+ Gram positive cocci in pairs*  3+ Polys*  --   --   --   --   --    URINE CULTURE   --   --   --  10,000-19,000 cfu/ml  --   --    BODY FLUID CULTURE, STERILE  3+ Growth of Staphylococcus aureus*  --   --   --   --   --        Last 24 Hours Medication List:   Current Facility-Administered Medications   Medication Dose Route Frequency Provider Last Rate    acetaminophen  975 mg Oral Q6H PRN Charisse Joyce, DO      atorvastatin  80 mg Oral Daily With [employer] Depope, DO      cefepime  1,000 mg Intravenous Q24H Charisse Joyce, DO Stopped (11/01/23 1100)    chlorhexidine  15 mL Mouth/Throat Q12H [address] [facility] [place], DO      clopidogrel  75 mg Oral Daily Charisse Joyce, DO      dextromethorphan-guaiFENesin  10 mL Oral Q4H PRN Charisse Joyce, DO      docusate sodium  100 mg Oral BID Charisse Joyce, DO      heparin (porcine)  5,000 Units Subcutaneous [facility] [address] [facility] Depope, DO      insulin lispro  1-5 Units Subcutaneous TID AC Charisse Joyce, DO      meclizine  25 mg Oral Q8H PRN Charisse Joyce, DO      nortriptyline  10 mg Oral HS Charisse Joyce, DO      ondansetron  4 mg Intravenous Q4H PRN Charisse Joyce, DO      oxybutynin  5 mg Oral BID Charisse Joyce, DO      oxyCODONE  2.5 mg Oral Q4H PRN Charisse Joyce, DO      pantoprazole  40 mg Oral Daily Charisse Joyce, DO      pregabalin  75 mg Oral BID Charisse Joyce, DO      senna  2 tablet Oral Once [employer] Depope, DO      sodium bicarbonate  1,300 mg Oral BID after meals Briana Henderson MD      sucralfate  1 g Oral 4x Daily (AC & HS) Jaz Gary Depope, DO      tacrolimus  1 mg Oral Q12H [address] [facility] Depope, DO      temazepam  15 mg Oral HS PRN Maylin Acosta DO          Today, Patient Was Seen By: Shannan Aguilar    **Please Note: This note may have been constructed using a voice recognition system. **

## 2023-11-02 NOTE — PLAN OF CARE
Post-Dialysis RN Treatment Note    Blood Pressure:  Pre 133/81 mm/Hg  Post     mmHg   EDW     kg    Weight:  Pre   67 kg   Post   kg   Mode of weight measurement: Bed Scale   Volume Removed  0 ml    Treatment duration 150 minutes    NS given  Yes, 350 ml    Treatment shortened? No   Medications given during Rx Albumin for  BP   Estimated Kt/V     Access type: CVC   Access Issues:  NA   Report called to primary nurse     Todd Acosta RN          Goal of treatment is the continued initiation to HD. Educating patient on reasonable expectations. Balancing electrolytes and removal of waste .       Problem: METABOLIC, FLUID AND ELECTROLYTES - ADULT  Goal: Electrolytes maintained within normal limits  Description: INTERVENTIONS:  - Monitor labs and assess patient for signs and symptoms of electrolyte imbalances  - Administer electrolyte replacement as ordered  - Monitor response to electrolyte replacements, including repeat lab results as appropriate  - Instruct patient on fluid and nutrition as appropriate  Outcome: Progressing  Goal: Fluid balance maintained  Description: INTERVENTIONS:  - Monitor labs   - Monitor I/O and WT  - Instruct patient on fluid and nutrition as appropriate  - Assess for signs & symptoms of volume excess or deficit  Outcome: Progressing

## 2023-11-02 NOTE — PROGRESS NOTES
NEPHROLOGY PROGRESS NOTE   Leidy Hammonds 74 y.o. adult MRN: [id number]  Unit/Bed#: [facility] 513-21 Encounter: [id number]  Reason for Consult: TAVON CKD    ASSESSMENT AND PLAN:  TAVON on CKD stage IV, baseline creatinine around 3.6, follows with Dr. Dana Gonzalez  -TAVON suspect secondary to ATN, UTI, MSSA bacteremia, hypotension, this is in the setting of right renal atrophy with moderate right hydronephrosis  -Creatinine peak level 7.5. Urine output remains lower  -Due to significantly worsening renal failure, concern for slowly developing uremic symptoms, hyperkalemia, worsening acidosis with minimal urine output, patient was started on hemodialysis on 11/1/2023.    -Noted hypotension issues on dialysis yesterday requiring briefly Levophed. Plan for second session of HD today. With likely plan for third session of HD on Saturday depending on urine output, renal function.  -Currently has temporary HD catheter. If continues to require dialysis going forward, will need permacath probably early next week. -BMP in a.m. I saw and examined patient during hemodialysis treatment at 9:56 AM on 11/2/2023. The patient was receiving hemodialysis for treatment of TAVON/CKd. I also reviewed vital signs, intake and output, lab results and recent events, and agree with dialysis order. Tolerating HD. Right renal atrophy with moderate right hydronephrosis, status post right ureteral stent.  -Status post Mcallister catheter, status post right-sided PCN tube placement with purulent urine drained on 10/31/2023. 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  -Urology following. As per medical records review, did not tolerate chemo due to toxicity. s/p radiation.      MSSA bacteremia, septic shock suspect in the setting of ascending UTI, status post right ureteral stent, currently antibiotic as per ID.  -Patient briefly required Levophed yesterday during dialysis and now remains off. History of liver transplant about 20 years ago as per patient. Now remains on tacrolimus. Tacrolimus trough level 7.6 on 10/30/2023   -Recommend discussion with liver transplant team if any adjustment in tacrolimus dosing needed. Metabolic acidosis, much improved with dialysis. Bicarb level 24. Decrease sodium bicarbonate to 2 tablet p.o. twice daily. Discussed above plan in detail with primary team regarding plan for dialysis today and they agree with above recommendations. SUBJECTIVE:  Patient seen and examined at bedside. Denies any nausea or vomiting. Feels tired.     OBJECTIVE:  Current Weight: Weight - Scale: 49.1 kg (108 lb 3.9 oz)  Vitals:    11/02/23 0935   BP: 99/63   Pulse: 92   Resp: 18   Temp:    SpO2:        Intake/Output Summary (Last 24 hours) at 11/2/2023 0954  Last data filed at 11/2/2023 0980  Gross per 24 hour   Intake 1651.87 ml   Output 624 ml   Net 1027.87 ml     Wt Readings from Last 3 Encounters:   11/02/23 49.1 kg (108 lb 3.9 oz)     Temp Readings from Last 3 Encounters:   11/02/23 97.9 °F (36.6 °C)     BP Readings from Last 3 Encounters:   11/02/23 99/63     Pulse Readings from Last 3 Encounters:   11/02/23 92        Physical Examination:  Eyes: Mild conjunctival pallor present  Neck: No obvious lymphadenopathy appreciated  Respiratory: Bilateral air entry present  CVS: No significant edema  GI: Soft, nondistended  CNS: Active, alert, follows commands  Skin: No new rash  Musculoskeletal: No obvious new gross deformity noted    Medications:    Current Facility-Administered Medications:     acetaminophen (TYLENOL) tablet 975 mg, 975 mg, Oral, Q6H PRN, Charisse Joyce, DO    albumin human (FLEXBUMIN) 25 % injection 12.5 g, 12.5 g, Intravenous, Once, Mukund Guillen MD    atorvastatin (LIPITOR) tablet 80 mg, 80 mg, Oral, Daily With Dinner, Little Joyce, DO, 80 mg at 11/01/23 1630    cefepime (MAXIPIME) 1,000 mg in dextrose 5 % 50 mL IVPB, 1,000 mg, Intravenous, Q24H, Charisse Joyce, DO, Stopped at 11/01/23 1100    chlorhexidine (PERIDEX) 0.12 % oral rinse 15 mL, 15 mL, Mouth/Throat, Q12H [address], Charisse Dodge Depope, DO, 15 mL at 11/01/23 2221    clopidogrel (PLAVIX) tablet 75 mg, 75 mg, Oral, Daily, Charisse Dodge Depope, DO, 75 mg at 11/01/23 0848    dextromethorphan-guaiFENesin (ROBITUSSIN DM) oral syrup 10 mL, 10 mL, Oral, Q4H PRN, Valentina Quiles Depope, DO, 10 mL at 10/30/23 0442    docusate sodium (COLACE) capsule 100 mg, 100 mg, Oral, BID, Charisse Joyce, DO, 100 mg at 11/01/23 0849    heparin (porcine) subcutaneous injection 5,000 Units, 5,000 Units, Subcutaneous, Q8H [address], Charisse Joyce, DO, 5,000 Units at 11/02/23 0523    insulin lispro (HumaLOG) 100 units/mL subcutaneous injection 1-5 Units, 1-5 Units, Subcutaneous, TID AC, 1 Units at 11/02/23 0749 **AND** Fingerstick Glucose (POCT), , , TID AC, Charisse Dodge Depope, DO    meclizine (ANTIVERT) tablet 25 mg, 25 mg, Oral, Q8H PRN, Charisse Dodge Depope, DO    nortriptyline (PAMELOR) capsule 10 mg, 10 mg, Oral, HS, Charisse Dodge Depope, DO, 10 mg at 11/01/23 2223    ondansetron (ZOFRAN) injection 4 mg, 4 mg, Intravenous, Q4H PRN, Valentina Quiles Depope, DO, 4 mg at 10/30/23 0503    oxybutynin (DITROPAN) tablet 5 mg, 5 mg, Oral, BID, Charisse Dodge Depope, DO, 5 mg at 11/01/23 1712    oxyCODONE (ROXICODONE) split tablet 2.5 mg, 2.5 mg, Oral, Q4H PRN, Valentina Joyce, DO, 2.5 mg at 11/01/23 1723    pantoprazole (PROTONIX) EC tablet 40 mg, 40 mg, Oral, Daily, Charisse Joyce DO, 40 mg at 11/01/23 0848    pregabalin (LYRICA) capsule 75 mg, 75 mg, Oral, BID, Charisse Joyce, DO, 75 mg at 11/01/23 1712    senna (SENOKOT) tablet 17.2 mg, 2 tablet, Oral, Once, Valentina Quiles Depope, DO    sodium bicarbonate tablet 1,300 mg, 1,300 mg, Oral, TID after meals, Valnetina Quiles Depope, DO, 1,300 mg at 11/01/23 1721    sucralfate (CARAFATE) tablet 1 g, 1 g, Oral, 4x Daily (AC & HS), Pino Signs Depope, DO, 1 g at 11/01/23 2216    tacrolimus (PROGRAF) capsule 1 mg, 1 mg, Oral, Q12H [address], Charisse Dodge Depope, DO    temazepam (RESTORIL) capsule 15 mg, 15 mg, Oral, HS PRN, Pino [employer] Depope, DO, 15 mg at 10/31/23 2112    Laboratory Results:  Results from last 7 days   Lab Units 11/02/23  0817 11/02/23  0017 11/01/23  1719 11/01/23  1230 11/01/23  0550 10/31/23  1548 10/31/23  0640 10/31/23  0456 10/30/23  1922 10/30/23  1414 10/30/23  1102 10/30/23  0900 10/30/23  0407 10/29/23  0553 10/28/23  0654 10/27/23  0526   WBC Thousand/uL 13.30*  --   --   --  17.62* 15.04*  --  14.80*  --  20.64*  --   --  2.62* 8.79  --  6.99   HEMOGLOBIN g/dL 8.6*  --   --   --  9.5* 8.5* 6.3* 6.9*  --  7.4*  --   --  9.4* 7.2*  --  8.9*   HEMATOCRIT % 25.0*  --   --   --  28.8* 24.4* 19.2* 20.9*  --  21.4*  --   --  29.6* 22.9*  --  26.2*   PLATELETS Thousands/uL 69*  --   --   --  67* 67*  --  66*  --  87*  --  77* 113* 101*  --  94*   SODIUM mmol/L 131* 130* 129* 125* 127*  --   --  132* 132*  --  136  --  132* 134*   < > 135   POTASSIUM mmol/L 4.2 4.6 4.2 5.7* 6.0*  --   --  4.6 5.1  --  3.1*  --  4.0 4.0   < > 3.7   CHLORIDE mmol/L 97 98 96 94* 96  --   --  100 99  --  100  --  97 101   < > 103   CO2 mmol/L 24 20* 22 18* 18*  --   --  21 20*  --  22  --  20* 21   < > 21   BUN mg/dL 71* 66* 60* 93* 88*  --   --  73* 70*  --  73*  --  72* 57*   < > 47*   CREATININE mg/dL 6.50* 5.91* 5.20* 7.51* 7.55*  --   --  6.68* 6.24*  --  6.52*  --  6.40* 5.44*   < > 4.66*   CALCIUM mg/dL 7.4* 7.3* 7.5* 8.0* 7.8*  --   --  7.3* 7.1*  --  7.2*  --  7.9* 7.4*   < > 7.7*   MAGNESIUM mg/dL 2.1  --   --   --  2.1  --   --  2.0  --   --  2.0  --   --   --   --  1.4*   PHOSPHORUS mg/dL 4.7*  --   --   --  6.4*  --   --  4.0  --   --   --   --   --   --   --  3.0    < > = values in this interval not displayed.        [address] [facility] kidney and bladder   Final Result by Dimitry Jackman MD (11/02 2337)      No right-sided hydronephrosis status post right nephrostomy catheter placement. Reidentified right renal cortical thinning and renal atrophy. Right pleural effusion, incompletely evaluated. Workstation performed: [id number]         XR chest portable ICU   Final Result by Alexandra Rizo MD (11/01 0075)   Again seen are soft tissue like masses bilaterally. CT chest with contrast is recommended. Mild congestive changes. Placement of right neck dialysis catheter in the distal SVC. Workstation performed: [id number]         IR nephrostomy tube placement   Final Result by Caitie Durand MD (11/01 2603)   Impression: Successful placement of a right 10 French nephrostomy tube from a right lower pole calyceal approach due to marked pyonephrosis. Tan purulent fluid was removed and sent for analysis. PLAN: Tube to bag drainage. Return in 6 weeks to remove the internal ureteral stent and replace the right nephrostomy tube. A nephroureteral catheter could be placed instead however it cannot be capped since the patient will not tolerate capping after    discussion with urology. Workstation performed: [id number]         IR port removal   Final Result by Easton Acosta MD (10/30 1815)      Right chest port removal at bedside. Plan:      Return to IR as needed. Workstation performed: [id number]         XR chest portable   Final Result by Lincoln Mcgowan MD (10/31 9699)   Bilateral pulmonary nodules suspicious for tumor. Further assessment via CT recommended      Interval placement of left IJ CVP line without complication      Persistent suspected right pulmonary pneumonia               Workstation performed: [id number]         XR chest pa & lateral   Final Result by Alexandra Rizo MD (10/27 7089)   Possible bibasilar infiltrates or crowding of vessels.  Bilateral metastasis unchanged. Workstation performed: [id number]         XR chest portable   Final Result by Abhishek Moyer MD (10/26 6830)      Mild opacity at the left base with loss of the left diaphragm. This could be due to atelectasis but pneumonia not excluded in the appropriate clinical setting. Bilateral lung metastases. Workstation performed: [id number]         CT abdomen pelvis with contrast   Final Result by Shade Phillip MD (10/23 9138)      1. Redemonstration of right renal atrophy and moderate right hydronephrosis with a double-J right ureteral stent. There is mild diffuse right urothelial thickening and enhancement. There is also mild diffuse thickening of the urinary bladder and adjacent    fat stranding. This may be explained by an infectious etiology including right ureteritis and cystitis. 2. Skin thickening and/or subcutaneous edema in the periumbilical area may be due to history of trauma. Workstation performed: [id number]         CT head without contrast   Final Result by Stephanie Mac DO (10/23 1300)   Addendum (preliminary) 1 of 1 by Stephanie Mac DO (10/23 1300)   ADDENDUM:   Prior from July 31, 2023 performed under separate medical record number. Prior left-sided aneurysm clipping and local encephalomalacia is    unchanged. I personally discussed the CT brain and C-spine with Farooqvidhyaozzie    on 10/23/2023 1:00 PM.            Final   No acute intracranial abnormality. Workstation performed: [id number]         CT spine cervical without contrast   Final Result by Stephanie Mac DO (10/23 1253)   No cervical spine fracture or traumatic malalignment. Workstation performed: [id number]         XR trauma multiple   Final Result by Norman Azul MD (10/23 3450)      Spiculated mass in the right upper lobe, little changed since 8/1/2023.  Interval increase in size of bilateral pulmonary metastases. No evidence of acute traumatic abnormality in the chest.      The study was marked in [facility] for immediate notification. Workstation performed: [id number]         XR chest portable   Final Result by Michelle Hernandez MD (10/24 6073)      Spiculated mass in the right upper lobe, little changed since 8/1/2023. Interval increase in size of bilateral pulmonary metastases. No evidence of acute traumatic abnormality in the chest.      The study was marked in [facility] for immediate notification. Workstation performed: [id number]             Portions of the record may have been created with voice recognition software. Occasional wrong word or "sound a like" substitutions may have occurred due to the inherent limitations of voice recognition software. Read the chart carefully and recognize, using context, where substitutions have occurred.

## 2023-11-02 NOTE — PLAN OF CARE
Problem: MOBILITY - ADULT  Goal: Maintain or return to baseline ADL function  Description: INTERVENTIONS:  -  Assess patient's ability to carry out ADLs; assess patient's baseline for ADL function and identify physical deficits which impact ability to perform ADLs (bathing, care of mouth/teeth, toileting, grooming, dressing, etc.)  - Assess/evaluate cause of self-care deficits   - Assess range of motion  - Assess patient's mobility; develop plan if impaired  - Assess patient's need for assistive devices and provide as appropriate  - Encourage maximum independence but intervene and supervise when necessary  - Involve family in performance of ADLs  - Assess for home care needs following discharge   - Consider OT consult to assist with ADL evaluation and planning for discharge  - Provide patient education as appropriate  Outcome: Progressing          Problem: PAIN - ADULT  Goal: Verbalizes/displays adequate comfort level or baseline comfort level  Description: Interventions:  - Encourage patient to monitor pain and request assistance  - Assess pain using appropriate pain scale  - Administer analgesics based on type and severity of pain and evaluate response  - Implement non-pharmacological measures as appropriate and evaluate response  - Consider cultural and social influences on pain and pain management  - Notify physician/advanced practitioner if interventions unsuccessful or patient reports new pain  Outcome: Progressing

## 2023-11-03 VITALS
BODY MASS INDEX: 19.18 KG/M2 | HEIGHT: 63 IN | OXYGEN SATURATION: 95 % | SYSTOLIC BLOOD PRESSURE: 127 MMHG | DIASTOLIC BLOOD PRESSURE: 62 MMHG | RESPIRATION RATE: 17 BRPM | HEART RATE: 83 BPM | WEIGHT: 108.25 LBS | TEMPERATURE: 98.8 F

## 2023-11-03 LAB — GLUCOSE SERPL-MCNC: 105 MG/DL (ref 65–140)

## 2023-11-03 PROCEDURE — 82948 REAGENT STRIP/BLOOD GLUCOSE: CPT

## 2023-11-03 PROCEDURE — 99239 HOSP IP/OBS DSCHRG MGMT >30: CPT | Performed by: PHYSICIAN ASSISTANT

## 2023-11-03 PROCEDURE — 99233 SBSQ HOSP IP/OBS HIGH 50: CPT | Performed by: INTERNAL MEDICINE

## 2023-11-03 RX ORDER — LORAZEPAM 2 MG/ML
0.5 INJECTION INTRAMUSCULAR EVERY 4 HOURS PRN
Status: DISCONTINUED | OUTPATIENT
Start: 2023-11-03 | End: 2023-11-03 | Stop reason: HOSPADM

## 2023-11-03 RX ORDER — HYDROMORPHONE HCL/PF 1 MG/ML
0.5 SYRINGE (ML) INJECTION EVERY 4 HOURS PRN
Status: DISCONTINUED | OUTPATIENT
Start: 2023-11-03 | End: 2023-11-03 | Stop reason: HOSPADM

## 2023-11-03 RX ORDER — HYDROMORPHONE HCL IN WATER/PF 6 MG/30 ML
0.2 PATIENT CONTROLLED ANALGESIA SYRINGE INTRAVENOUS
Status: DISCONTINUED | OUTPATIENT
Start: 2023-11-03 | End: 2023-11-03 | Stop reason: HOSPADM

## 2023-11-03 RX ADMIN — HYDROMORPHONE HYDROCHLORIDE 0.2 MG: 0.2 INJECTION, SOLUTION INTRAMUSCULAR; INTRAVENOUS; SUBCUTANEOUS at 12:51

## 2023-11-03 RX ADMIN — Medication 2.5 MG: at 09:33

## 2023-11-03 RX ADMIN — HEPARIN SODIUM 5000 UNITS: 5000 INJECTION INTRAVENOUS; SUBCUTANEOUS at 06:32

## 2023-11-03 RX ADMIN — HYDROMORPHONE HYDROCHLORIDE 0.5 MG: 1 INJECTION, SOLUTION INTRAMUSCULAR; INTRAVENOUS; SUBCUTANEOUS at 15:21

## 2023-11-03 RX ADMIN — HYDROMORPHONE HYDROCHLORIDE 0.2 MG: 0.2 INJECTION, SOLUTION INTRAMUSCULAR; INTRAVENOUS; SUBCUTANEOUS at 20:53

## 2023-11-03 RX ADMIN — LORAZEPAM 0.5 MG: 2 INJECTION INTRAMUSCULAR; INTRAVENOUS at 18:27

## 2023-11-03 NOTE — CASE MANAGEMENT
Case Management Discharge Planning Note    Patient name Jeff Powell  Location [facility] 614/[facility] 317-67 MRN [id number]  : 1950 Date 11/3/2023       Current Admission Date: 10/23/2023  Current Admission Diagnosis:Septic shock [facility])   Patient Active Problem List    Diagnosis Date Noted    Septic shock ([address]) 10/30/2023    MSSA bacteremia 10/26/2023    Fall 10/23/2023    Bladder cancer 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 ([address]) 10/23/2023    Insomnia 10/23/2023    History of liver transplant ([address]) 10/23/2023    T2DM (type 2 diabetes mellitus) ([address]) 10/23/2023    Acute on chronic renal failure  10/23/2023    UTI (urinary tract infection) 10/23/2023    Dementia ([address]) 10/23/2023    CKD (chronic kidney disease) 10/23/2023    Thrombocytopenia ([address]) 10/23/2023    History of CVA (cerebrovascular accident) 10/23/2023    Anemia 10/23/2023      LOS (days): 11  Geometric Mean LOS (GMLOS) (days): 3.90  Days to GMLOS:-7.1     OBJECTIVE:  Risk of Unplanned Readmission Score: 29.41         Current admission status: Inpatient   Preferred Pharmacy:   [address] [place] [address] Avenue TO [facility]-PRESCRIBE  No address on file      Primary Care Provider: Ignacia Concepcion DO    Primary Insurance: Chandrika Abraham [facility]  Secondary Insurance:     DISCHARGE DETAILS:    Discharge planning discussed with[de-identified] Daughter, son, S/O  Freedom of Choice: Yes     Other Referral/Resources/Interventions Provided:  Referral Comments:  IPU approved patient to come tonight, family aware and in agreement    Transport at Discharge : [facility]  Dispatcher Contacted: Yes  Number/Name of Dispatcher: [facility]  Transported by [employer] and Unit #):  [facility]  ETA of Transport (Date): 23  ETA of Transport (Time): 2100     Accepting Facility Name, [address] [place] [address] Street : [address] [facility] hospice unit  Receiving Facility/Agency Phone Number: [phone]

## 2023-11-03 NOTE — HOSPICE NOTE
Hospice referral received. Patient approved for GIP LOC at [facility] by Dr Shell Geiger. Liaison met with pt wife, Fei Martel, and several other family members at pt bedside. Explained hospice services and answered questions. Family all in agreement with proceeding with hospice services at [facility]. Consents signed at bedside by dtr/POALEXANDR, Delbert Hidalgo. OOH DNR given to ANNE Sanford to be signed by provider. Transport requested for 2100 this evening - awaiting confirmation. Report can be called to [phone].

## 2023-11-03 NOTE — PROGRESS NOTES
[address] [facility]  Progress Note  Name: Nani Barton I  MRN: [id number]  Unit/Bed#: [facility] 159-04 I Date of Admission: 10/23/2023   Date of Service: 11/3/2023 I Hospital Day: 6  DOS: 11/3/2023  Assessment/Plan   * Septic shock ([address])  Assessment & Plan  Pt initially presented with multiple falls, found to have MSSA bacteremia, went into septic shock secondary to UTI with ascending infection with persistent hypotension requiring pressor support and MICU admission on 10/30  S/p right PCN placement by IR on 10/31   ID following, currently on IV cefepime, will d/c as patient's POAs/family requesting hospice, down graded to level 4 comfort measures only   S/p port removal this admission as well   Pt stable off pressor support and transitioned to med/surg level of care on 11/2   Pt not very interactive over the last 24-48 hours, not eating or drinking anything, refusing multiple medications. Upon further discussion with patient's daughter and wife, they continue to be in agreement to transition patient to hospice. They do not believe the patient would want any further dialysis and did speak with nephrology regarding this. Agreeable to transition to level 4 comfort measures only, no additional HD. Hospice consult placed and liaison to speak to family today. Hopeful for possible hospice house. As patient's POA (daughter and wife) appear to be very clear on patient's wishes, no indication for further family meeting for today.  They are in agreement with plan   Will add PRN IV dilaudid and IV ativan for comfort medications    Acute on chronic renal failure   Assessment & Plan  History of CKD with baseline creatinine of 3.3-3.6  TAVON on CKD this admission likely in setting of ATN, UTI, MSSA bacteremia, hypotension and right sided hydronephrosis   S/p right PCN tube by IR on 10/31 without significant improvement   Nephrology following,  Temp HD catheter placed on 11/1 by ICU team   Pt with hyperkalemia and peak cr. Of 7.5, started HD on 11/2, was not tolerating well, family reports that at prior palliative appointments patient had made it clear he would not want dialysis in the future. Pt's daughter and wife are in agreement to stopping HD based on patient's wishes. Level 4 comfort care placed and hospice consult pending. No additional HD planned at this time    MSSA bacteremia  Assessment & Plan  Plan as above  Likely in setting of UTI, ascending renal infection   ID following,  Additional abx to be discontinued in setting of hospice transition   Repeat cultures are negative growth to date   Port removed this admission     Anemia  Assessment & Plan  Chronic anemia likely due to CKD and underlying infection   S/p 2 U PRBC total this admission, last given on 10/31   Hgb 8.6 most recently, additional blood draws on hold in setting of comfort/hospice status   No evidence of acute bleeding noted    History of CVA (cerebrovascular accident)  Assessment & Plan  Remote history of CVA    Thrombocytopenia ([address])  Assessment & Plan  Lab Results   Component Value Date    PLT  11/02/2023      Comment:      NOT MEASURE PLATELET CLUMPS NOTED  This is a corrected result.  Previous result was 69 Thousands/uL on 11/2/2023 at 0920 EDT     Chronic on review, acutely decreased here   Likely in setting of MSSA bacrteremia, TAVON on CKD requiring dialysis   Most recent platelets 67, repeat level this AM with clumped platelets, unable to get accurate count, hold off on additional blood draws for now in setting of comfort measures only     UTI (urinary tract infection)  Assessment & Plan  History of UTIs, this admission, concern for ascending UTI with subsequent MSSA bacteremia and septic shock as noted above   CT scan on admission with moderate right hydronephrosis and bladder wall thickening  ID following,  Currently on IV cefepime, transitioning to comfort care/hospice will d/c further abx at this time   Prior CT scan reviewed this similar ureteritis findings however cystitis appears to be new  Repeat blood cultures are negative for growth to date after initial positive cultures  Uine culture is positive for alpha step and MSSA      T2DM (type 2 diabetes mellitus) [facility])  Assessment & Plan  Lab Results   Component Value Date    HGBA1C 7.4 (H) 11/02/2023       Recent Labs     11/02/23  1207 11/02/23  1649 11/02/23  2051 11/03/23  0748   POCGLU 112 134 128 105         Blood Sugar Average: Last 72 hrs:  (P) [id number]  Updated hgbA1c appears stable  Not maintained on any oral agents outpatient  Hold additional insulin and fingersticks due to comfort measures     History of liver transplant [facility])  Assessment & Plan  History of liver transplant secondary to alcoholic cirrhosis approximately 20 years ago   Pt refusing po tacrolimus here     Bladder cancer 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 [facility])  Assessment & Plan  History of stage IV bladder tumor status post resection and radiation with metastasis to the lungs  TURBT in 2022 with internalized stent  Supportive care  Continue nortriptyline and Lyrica for comfort   PRN IV dilaudid and ativan ordered for pain and anxiety     Fall  Assessment & Plan  Pt presented after multiple falls at home   Likely in setting of sepsis, MSSA bacteremia, hypotension, hydronephrosis   Plan as above            VTE Pharmacologic Prophylaxis: VTE Score: 6 High Risk (Score >/= 5) - Pharmacological DVT Prophylaxis Contraindicated. Sequential Compression Devices Ordered. Comfort measures     Patient Centered Rounds: I evaluated the patient without nursing staff present due to speaking to nurse outside patient's room   Discussions with Specialists or Other Care Team Provider: Discussed with RN, CM, nephrology and reviewed previous notes      Education and Discussions with Family / Patient: Updated  (daughter and significant other) at bedside.     Total Time Spent on Date of Encounter in care of patient: 50 mins. This time was spent on one or more of the following: performing physical exam; counseling and coordination of care; obtaining or reviewing history; documenting in the medical record; reviewing/ordering tests, medications or procedures; communicating with other healthcare professionals and discussing with patient's family/caregivers. Current Length of Stay: 11 day(s)  Current Patient Status: Inpatient   Certification Statement: The patient will continue to require additional inpatient hospital stay due to transition to comfort measures, hospice consult   Discharge Plan: Anticipate discharge in 24-48 hrs to TBD, possible hospice house     Code Status: Level 4 - Comfort Care    Subjective:   Pt continues to be withdrawn today, not very interactive. Wife is concerned that he may not recognize her at this point. Pushes away food and water, stating "no". Wife is concerned that he may be in pain. Wife and daughter Vanna Luna are in agreement to transition to hospice as they state that patient would not have wanted to be on dialysis and he appears to be uncomfortable now. They are in agreement to comfort measures and hospice consult. They also had discussion with nephrology at bedside, no more HD or additional blood draws. Objective:     Vitals:   Temp (24hrs), Av.5 °F (36.9 °C), Min:98 °F (36.7 °C), Max:98.8 °F (37.1 °C)    Temp:  [98 °F (36.7 °C)-98.8 °F (37.1 °C)] 98.8 °F (37.1 °C)  HR:  [86-89] 88  Resp:  [16-17] 16  BP: (127-138)/(62-78) 127/62  SpO2:  [94 %-95 %] 95 %  Body mass index is 19.17 kg/m². Input and Output Summary (last 24 hours): Intake/Output Summary (Last 24 hours) at 11/3/2023 1129  Last data filed at 11/3/2023 0630  Gross per 24 hour   Intake 20 ml   Output 550 ml   Net -530 ml       Physical Exam:   Physical Exam  Vitals reviewed. Constitutional:       General: Chidi Picking is not in acute distress. Comments: Pt is in no acute distress lying in his hospital bed.  Will open eyes to verbal stimuli. Only states "no" and refuses any food or liquids. HENT:      Head: Normocephalic. Cardiovascular:      Rate and Rhythm: Normal rate and regular rhythm. Pulmonary:      Effort: No respiratory distress. Skin:     General: Skin is warm and dry. Additional Data:     Labs:  Results from last 7 days   Lab Units 11/02/23  0817 11/01/23  0550 10/31/23  0640 10/31/23  0456   WBC Thousand/uL 13.30* 17.62*   < > 14.80*   HEMOGLOBIN g/dL 8.6* 9.5*   < > 6.9*   HEMATOCRIT % 25.0* 28.8*   < > 20.9*   PLATELETS Thousands/uL  --  67*   < > 66*   BANDS PCT %  --   --   --  29*   NEUTROS PCT % 89*  --   --   --    LYMPHO PCT %  --   --   --  1*   MONOS PCT % 2*  --   --   --    MONO PCT %  --   --   --  1*   EOS PCT % 1  --   --  1    < > = values in this interval not displayed.      Results from last 7 days   Lab Units 11/02/23  0817   SODIUM mmol/L 131*   POTASSIUM mmol/L 4.2   CHLORIDE mmol/L 97   CO2 mmol/L 24   BUN mg/dL 71*   CREATININE mg/dL 6.50*   ANION GAP mmol/L 10   CALCIUM mg/dL 7.4*   GLUCOSE RANDOM mg/dL 152*     Results from last 7 days   Lab Units 10/30/23  0900   INR  1.27*     Results from last 7 days   Lab Units 11/03/23  0748 11/02/23  2051 11/02/23  1649 11/02/23  1207 11/02/23  0741 11/01/23  1712 11/01/23  1207 11/01/23  0727 10/31/23  1552 10/31/23  1042 10/31/23  0614 10/30/23  1659   POC GLUCOSE mg/dl 105 128 134 112 158* 159* 216* 163* 97 136 114 115     Results from last 7 days   Lab Units 11/02/23  0817   HEMOGLOBIN A1C % 7.4*     Results from last 7 days   Lab Units 10/31/23  0456 10/30/23  1414 10/30/23  1102 10/30/23  0410 10/29/23  0553   LACTIC ACID mmol/L  --  1.1 2.7* 1.6  --    PROCALCITONIN ng/ml 119.68*  --   --  49.65* 56.62*       Lines/Drains:  Invasive Devices       Peripheral Intravenous Line  Duration             Peripheral IV 10/30/23 Left;Upper;Ventral (anterior) Arm 4 days              Hemodialysis Catheter  Duration             HD Temporary Double Catheter 11 days              Drain  Duration             Urethral Catheter Temperature probe 16 Fr. 4 days    Nephrostomy Right 10.2 Fr. 2 days                  Urinary Catheter:  Goal for removal: N/A- Discharging with [place]               Imaging: No pertinent imaging reviewed. Recent Cultures (last 7 days):   Results from last 7 days   Lab Units 10/31/23  1504 10/30/23  1522 10/30/23  1507 10/30/23  1102 10/28/23  0603 10/27/23  1620   BLOOD CULTURE   --  No Growth at 72 hrs. No Growth at 72 hrs.  --  No Growth After 5 Days. No Growth After 5 Days. No Growth After 5 Days.    GRAM STAIN RESULT  4+ Gram negative rods*  3+ Gram positive cocci in pairs*  3+ Polys*  --   --   --   --   --    URINE CULTURE   --   --   --  10,000-19,000 cfu/ml  --   --    BODY FLUID CULTURE, STERILE  3+ Growth of Staphylococcus aureus*  3+ Growth of Streptococcus anginosus*  --   --   --   --   --        Last 24 Hours Medication List:   Current Facility-Administered Medications   Medication Dose Route Frequency Provider Last Rate    acetaminophen  975 mg Oral Q6H PRN Charisse Joyce, DO      cefepime  1,000 mg Intravenous Q24H Charisse Joyce, DO Stopped (11/03/23 1056)    chlorhexidine  15 mL Mouth/Throat Q12H [address] [facility] Eleuterioope, DO      clopidogrel  75 mg Oral Daily Charisse Joyce, DO      dextromethorphan-guaiFENesin  10 mL Oral Q4H PRN Charisse Joyce, DO      docusate sodium  100 mg Oral BID Charisse Joyce, DO      HYDROmorphone  0.5 mg Intravenous Q4H PRN Ella Rumpf, PA-C      HYDROmorphone  0.2 mg Intravenous Q3H PRN Ella Rumpf, PA-C      LORazepam  0.5 mg Intravenous Q4H PRN Ella Rumpf, PA-C      meclizine  25 mg Oral Q8H PRN Charisse Joyce, DO      nortriptyline  10 mg Oral HS Charisse Joyce, DO      ondansetron  4 mg Intravenous Q4H PRN Charisse Joyce, DO      oxybutynin  5 mg Oral BID Charisse Joyce DO      pantoprazole  40 mg Oral Daily Charisse Joyce, DO      pregabalin  75 mg Oral BID Charisse Joyce, DO      sodium bicarbonate  1,300 mg Oral BID after meals Mukund Pereira MD      sucralfate  1 g Oral 4x Daily (AC & HS) Tony Joyce, DO      tacrolimus  1 mg Oral Q12H [address] Charisse Joyce, DO      temazepam  15 mg Oral HS PRN Tony Joyce, DO          Today, Patient Was Seen By: Mason Monge PA-C    **Please Note: This note may have been constructed using a voice recognition system. **

## 2023-11-03 NOTE — ASSESSMENT & PLAN NOTE
- Status post fall without head strike with the below noted injuries. - Fall precautions. - Geriatric Medicine consultation for evaluation, medication review and recommendations.  - PT and OT evaluation and treatment as indicated. - Case Management consultation for disposition planning.
A&O x4  Outpatient follow-up  Delirium precautions
Chronic anemia likely due to CKD and multiple blood draws  Likely worsened due to acute infection  Check anemia studies and transfuse 1 unit of PBR 10/25  Blood consent obtained
Chronic anemia likely due to CKD and multiple blood draws  Likely worsened due to acute infection  Check anemia studies and transfuse 1 unit of PBRCS 10/25  Blood consent obtained from his daughter
Chronic anemia likely due to CKD and multiple blood draws  Likely worsened due to acute infection  Check anemia studies and transfuse 1 unit of PBRCS today  Blood consent obtained from his daughter
Chronic anemia likely due to CKD and underlying infection   S/p 2 U PRBC total this admission, last given on 10/31   Hgb 8.6 most recently, additional blood draws on hold in setting of comfort/hospice status   No evidence of acute bleeding noted
Chronic anemia likely due to CKD and underlying infection   S/p 2 U PRBC total this admission, last given on 10/31   Hgb 8.6 most recently, additional blood draws on hold in setting of comfort/hospice status   No evidence of acute bleeding noted
Chronic anemia likely due to CKD and underlying infection   S/p 2 U PRBC total this admission, last given on 10/31   Hgb 8.6 today, stable   No evidence of acute bleeding noted
Continue temazepam and Ambien which patient has been on for 30 years  Confirmed with PDMP
Creat 7.51; has been rising this admit, baseline 2.5-3.6  Currently on hemodialysis  CT: Right renal atrophy and moderate right hydronephrosis with right double-J stent in place  Continue Mcallister catheter placement for maximum drainage  Medical optimization per critical care team  S/p right LATRICE by IR 10/31
Developed fever and hypotension again today, likely due to port in place.   Blood cultures have been negative  Rapid response called, vasopressors started, transfer to MICU
Hgb 8.0, stable from baseline  Continue to monitor
History of CKD with baseline creatinine of 3.3-3.6  Likely TAVON on CKD due to poor po intake and possible contrast nephropathy  Presenting with creatinine of 4.2  Not meeting criteria for TAVON though likely elevated secondary to underlying CKD and poor oral intake  Received IV contrast for trauma scans  Avoid nephrotoxic agents and relative hypotension  Nephrology following, no indication to do HD at this time.   Renal function worse today d/w nephrology, may need to start renal replacement therapy today
History of CKD with baseline creatinine of 3.3-3.6  Likely TAVON on CKD due to poor po intake and possible contrast nephropathy  Presenting with creatinine of 4.2  Not meeting criteria for TAVON though likely elevated secondary to underlying CKD and poor oral intake  Received IV contrast for trauma scans  Avoid nephrotoxic agents and relative hypotension  Nephrology following, no indication to do HD at this time.   Renal function worse today had a fever and hypotension after yesterday, will increase IVF, d/w nephrology
History of CKD with baseline creatinine of 3.3-3.6  Likely TAVON on CKD due to poor po intake and possible contrast nephropathy  Presenting with creatinine of 4.2  Not meeting criteria for TAVON though likely elevated secondary to underlying CKD and poor oral intake  Received IV contrast for trauma scans  IVF hydration  Avoid nephrotoxic agents and relative hypotension  Nephrology following, no indication to do HD at this time.   Renal function is slowly improving, will continue to monitor closely
History of CKD with baseline creatinine of 3.3-3.6  Likely TAVON on CKD due to poor po intake and possible contrast nephropathy  Presenting with creatinine of 4.2  Not meeting criteria for TAVON though likely elevated secondary to underlying CKD and poor oral intake  Received IV contrast for trauma scans  IVF hydration  Avoid nephrotoxic agents and relative hypotension  Nephrology following, no indication to do HD at this time.   Renal function is slowly improving, will continue to monitor closely
History of CKD with baseline creatinine of 3.3-3.6  Likely TAVON on CKD due to poor po intake and possible contrast nephropathy  Presenting with creatinine of 4.2  Not meeting criteria for TAVON though likely elevated secondary to underlying CKD and poor oral intake  Received IV contrast for trauma scans  IVF hydration  Does not wish to pursue dialysis  Monitor creatinine daily  Avoid nephrotoxic agents and relative hypotension  Nephrology following
History of CKD with baseline creatinine of 3.3-3.6  Likely TAVON on CKD due to poor po intake and possible contrast nephropathy  Presenting with creatinine of 4.2  Not meeting criteria for TAVON though likely elevated secondary to underlying CKD and poor oral intake  Received IV contrast for trauma scans  IVF hydration  Monitor creatinine daily  Avoid nephrotoxic agents and relative hypotension  Nephrology following, no indication to do HD at this time.
History of CKD with baseline creatinine of 3.3-3.6  Likely TAVON on CKD due to poor po intake and possible contrast nephropathy  Presenting with creatinine of 4.2  Not meeting criteria for TAVON though likely elevated secondary to underlying CKD and poor oral intake  Received IV contrast for trauma scans  IVF hydration  Monitor creatinine daily  Avoid nephrotoxic agents and relative hypotension  Nephrology following, no indication to do HD at this time.
History of CKD with baseline creatinine of 3.3-3.6  Presenting with creatinine of 4.2  Not meeting criteria for TAVON though likely elevated secondary to underlying CKD and poor oral intake  Received IV contrast for trauma scans  IVF hydration  Does not wish to pursue dialysis  Monitor creatinine in AM  Avoid nephrotoxic agents and relative hypotension  Consider nephrology consultation in the morning if creatinine continues to rise
History of CKD with baseline creatinine of 3.3-3.6  TAVON on CKD this admission likely in setting of ATN, UTI, MSSA bacteremia, hypotension and right sided hydronephrosis   S/p right PCN tube by IR on 10/31  Nephrology following,  Temp HD catheter placed on 11/1 by ICU team   Pt with hyperkalemia and peak cr. Of 7.5, plans to start HD today, 11/2  Family meeting with goals of care discussions tomorrow regarding ongoing HD.  Continues to be level 3 DNR/DNI status currently  Avoid nephrotoxic agents and relative hypotension
History of CKD with baseline creatinine of 3.3-3.6  TAVON on CKD this admission likely in setting of ATN, UTI, MSSA bacteremia, hypotension and right sided hydronephrosis   S/p right PCN tube by IR on 10/31 without significant improvement   Nephrology following,  Temp HD catheter placed on 11/1 by ICU team   Pt with hyperkalemia and peak cr. Of 7.5, started HD on 11/2, was not tolerating well, family reports that at prior palliative appointments patient had made it clear he would not want dialysis in the future. Pt's daughter and wife are in agreement to stopping HD based on patient's wishes. Level 4 comfort care placed and hospice consult pending.  No additional HD planned at this time
History of CKD with baseline creatinine of 3.3-3.6  TAVON on CKD this admission likely in setting of ATN, UTI, MSSA bacteremia, hypotension and right sided hydronephrosis   S/p right PCN tube by IR on 10/31 without significant improvement   Nephrology following,  Temp HD catheter placed on 11/1 by ICU team   Pt with hyperkalemia and peak cr. Of 7.5, started HD on 11/2, was not tolerating well, family reports that at prior palliative appointments patient had made it clear he would not want dialysis in the future. Pt's daughter and wife are in agreement to stopping HD based on patient's wishes. Level 4 comfort care placed and hospice consult pending. No additional HD planned at this time.  Transition to IPU/hospice house on discharge
History of UTIs  Patient denies any dysuria however family reports that he has been complaining of dysuria for the last few days with mild confusion  UA with signs of infection  CT A/P with right renal atrophy, moderate right hydronephrosis with right ureteral stent. There is mild diffuse right urothelial thickening and enhancement. There is also mild diffuse thickening of the urinary bladder and adjacent fat stranding. This may be explained by infectious etiology including right ureteritis and cystitis.   Prior CT scan reviewed this similar ureteritis findings however cystitis appears to be new  10/26, 10/27, 10/28 cultures are negative for growth to date  Uine culture is positive for alpha step and MSSA, continue cefazolin  Per urology no indication for stent change  Monitor temps, WBCs
History of UTIs  Patient denies any dysuria however family reports that he has been complaining of dysuria for the last few days with mild confusion  UA with signs of infection  CT A/P with right renal atrophy, moderate right hydronephrosis with right ureteral stent. There is mild diffuse right urothelial thickening and enhancement. There is also mild diffuse thickening of the urinary bladder and adjacent fat stranding. This may be explained by infectious etiology including right ureteritis and cystitis.   Prior CT scan reviewed this similar ureteritis findings however cystitis appears to be new  10/26, 10/27, 10/28 cultures are negative for growth to date  Uine culture is positive for alpha step and MSSA, continue cefazolin  Per urology no indication for stent change  Monitor temps, WBCs
History of UTIs  Patient denies any dysuria however family reports that he has been complaining of dysuria for the last few days with mild confusion  UA with signs of infection  CT A/P with right renal atrophy, moderate right hydronephrosis with right ureteral stent. There is mild diffuse right urothelial thickening and enhancement. There is also mild diffuse thickening of the urinary bladder and adjacent fat stranding. This may be explained by infectious etiology including right ureteritis and cystitis.   Prior CT scan reviewed this similar ureteritis findings however cystitis appears to be new  Bcx negative for growth to date  Uine culture is positive for alpha step and MSSA, continue cefazolin  Per urology no indication for stent change  Monitor temps, WBCs  Repeat cultures  May need port removed
History of UTIs  Patient denies any dysuria however family reports that he has been complaining of dysuria for the last few days with mild confusion  UA with signs of infection  CT A/P with right renal atrophy, moderate right hydronephrosis with right ureteral stent. There is mild diffuse right urothelial thickening and enhancement. There is also mild diffuse thickening of the urinary bladder and adjacent fat stranding. This may be explained by infectious etiology including right ureteritis and cystitis.   Prior CT scan reviewed this similar ureteritis findings however cystitis appears to be new  Bcx negative for growth to date  Uine culture is positive for alpha step and MSSA, continue cefazolin  Per urology no indication for stent change  Monitor temps, WBCs  Repeat cultures are pending
History of UTIs  Patient denies any dysuria however family reports that he has been complaining of dysuria for the last few days with mild confusion  UA with signs of infection  CT A/P with right renal atrophy, moderate right hydronephrosis with right ureteral stent. There is mild diffuse right urothelial thickening and enhancement. There is also mild diffuse thickening of the urinary bladder and adjacent fat stranding. This may be explained by infectious etiology including right ureteritis and cystitis.   Prior CT scan reviewed this similar ureteritis findings however cystitis appears to be new  Bcx negative for growth to date  Uine culture is positive for alpha step and MSSA, continue cefazolin  Per urology no indication for stent change  Monitor temps, WBCs  Repeat cultures are pending
History of UTIs  Patient denies any dysuria however family reports that he has been complaining of dysuria for the last few days with mild confusion  UA with signs of infection  CT A/P with right renal atrophy, moderate right hydronephrosis with right ureteral stent. There is mild diffuse right urothelial thickening and enhancement. There is also mild diffuse thickening of the urinary bladder and adjacent fat stranding. This may be explained by infectious etiology including right ureteritis and cystitis.   Prior CT scan reviewed this similar ureteritis findings however cystitis appears to be new  Bcx negative for growth to date  Uine culture is positive for alpha step and MSSA, continue ceftriaxone  Per urology no indication for stent change  Monitor temps, WBCs
History of UTIs  Patient denies any dysuria however family reports that he has been complaining of dysuria for the last few days with mild confusion  UA with signs of infection  CT A/P with right renal atrophy, moderate right hydronephrosis with right ureteral stent. There is mild diffuse right urothelial thickening and enhancement. There is also mild diffuse thickening of the urinary bladder and adjacent fat stranding. This may be explained by infectious etiology including right ureteritis and cystitis.   Prior CT scan reviewed this similar ureteritis findings however cystitis appears to be new  Bcx ordered  Awaiting urine culture  Continue ceftriaxone  Consult with urology for evaluation to determine whether his stent needs exchanging
History of UTIs  Patient denies any dysuria however family reports that he has been complaining of dysuria for the last few days with mild confusion  UA with signs of infection  CT A/P with right renal atrophy, moderate right hydronephrosis with right ureteral stent. There is mild diffuse right urothelial thickening and enhancement. There is also mild diffuse thickening of the urinary bladder and adjacent fat stranding. This may be explained by infectious etiology including right ureteritis and cystitis.   Prior CT scan reviewed this similar ureteritis findings however cystitis appears to be new  Bcx ordered  Start IV ceftriaxone  Follow-up urine culture
History of UTIs, this admission, concern for ascending UTI with subsequent MSSA bacteremia and septic shock as noted above   CT scan on admission with moderate right hydronephrosis and bladder wall thickening  ID following,  Continue IV cefepime for now   Follow up IR cultures and tailor abx as appropriately   Will need a 4 week total course of abx from [facility] clearance pending goals conversation as above  Prior CT scan reviewed this similar ureteritis findings however cystitis appears to be new  Repeat blood cultures are negative for growth to date after initial positive cultures  Uine culture is positive for alpha step and MSSA  Monitor temps, WBCs
History of UTIs, this admission, concern for ascending UTI with subsequent MSSA bacteremia and septic shock as noted above   CT scan on admission with moderate right hydronephrosis and bladder wall thickening  ID following,  Currently on IV cefepime, transitioning to comfort care/hospice will d/c further abx at this time   Prior CT scan reviewed this similar ureteritis findings however cystitis appears to be new  Repeat blood cultures are negative for growth to date after initial positive cultures  Uine culture is positive for alpha step and MSSA
History of UTIs, this admission, concern for ascending UTI with subsequent MSSA bacteremia and septic shock as noted above   CT scan on admission with moderate right hydronephrosis and bladder wall thickening  ID following,  Currently on IV cefepime, transitioning to comfort care/hospice will d/c further abx at this time   Repeat blood cultures are negative for growth to date after initial positive cultures  Uine culture is positive for alpha step and MSSA
History of liver transplant secondary to alcoholic cirrhosis  Continue tacrolimus 1 mg every 12 hours  Follow-up with GI outpatient
History of liver transplant secondary to alcoholic cirrhosis approximately 20 years ago   Continue tacrolimus 1 mg every 12 hours  Tacrolimus level pending from this AM, follow up results   Follow-up with GI outpatient
History of liver transplant secondary to alcoholic cirrhosis approximately 20 years ago   Pt refusing po tacrolimus here
History of liver transplant secondary to alcoholic cirrhosis approximately 20 years ago   Pt refusing po tacrolimus here
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
Lab Results   Component Value Date     (L) 10/29/2023     Chronic at baseline
Lab Results   Component Value Date     (L) 10/30/2023     Chronic at baseline
Lab Results   Component Value Date    CREATININE 4.25 (H) 10/23/2023
Lab Results   Component Value Date    CREATININE 4.25 (H) 10/23/2023   Plan as above
Lab Results   Component Value Date    CREATININE 4.66 (H) 10/27/2023    CREATININE 4.85 (H) 10/26/2023    CREATININE 5.01 (H) 10/25/2023   Plan as above
Lab Results   Component Value Date    CREATININE 4.67 (H) 10/28/2023    CREATININE 4.66 (H) 10/27/2023    CREATININE 4.85 (H) 10/26/2023   Plan as above
Lab Results   Component Value Date    CREATININE 4.85 (H) 10/26/2023    CREATININE 5.01 (H) 10/25/2023    CREATININE 4.20 (H) 10/24/2023   Plan as above
Lab Results   Component Value Date    CREATININE 5.01 (H) 10/25/2023    CREATININE 4.20 (H) 10/24/2023    CREATININE 4.25 (H) 10/23/2023   Plan as above
Lab Results   Component Value Date    CREATININE 5.44 (H) 10/29/2023    CREATININE 4.67 (H) 10/28/2023    CREATININE 4.66 (H) 10/27/2023   Plan as above
Lab Results   Component Value Date    CREATININE 6.40 (H) 10/30/2023    CREATININE 5.44 (H) 10/29/2023    CREATININE 4.67 (H) 10/28/2023   Plan as above
Lab Results   Component Value Date    HGBA1C 7.4 (H) 11/02/2023       Recent Labs     11/02/23  1207 11/02/23  1649 11/02/23 2051 11/03/23  0748   POCGLU 112 134 128 105         Blood Sugar Average: Last 72 hrs:  (P) 619.2333121328094658  Updated hgbA1c appears stable  Not maintained on any oral agents outpatient  Hold additional insulin and fingersticks due to comfort measures
Lab Results   Component Value Date    HGBA1C 7.4 (H) 11/02/2023       Recent Labs     11/02/23  1207 11/02/23  1649 11/02/23 2051 11/03/23  0748   POCGLU 112 134 128 105         Blood Sugar Average: Last 72 hrs:  (P) 672.3497935976583510  Updated hgbA1c appears stable  Not maintained on any oral agents outpatient  Hold additional insulin and fingersticks due to comfort measures
Lab Results   Component Value Date    PLT  11/02/2023      Comment:      NOT MEASURE PLATELET CLUMPS NOTED  This is a corrected result.  Previous result was 69 Thousands/uL on 11/2/2023 at 0920 EDT     Chronic on review, acutely decreased here   Likely in setting of MSSA bacrteremia, TAVON on CKD requiring dialysis   Most recent platelets 67, repeat level this AM with clumped platelets, unable to get accurate count, hold off on additional blood draws for now in setting of comfort measures only
Lab Results   Component Value Date    PLT  11/02/2023      Comment:      NOT MEASURE PLATELET CLUMPS NOTED  This is a corrected result.  Previous result was 69 Thousands/uL on 11/2/2023 at 0920 EDT     Chronic on review, acutely decreased here   Likely in setting of MSSA bacrteremia, TAVON on CKD requiring dialysis   Most recent platelets 67, repeat level this AM with clumped platelets, unable to get accurate count, hold off on additional blood draws for now in setting of comfort measures only
Lab Results   Component Value Date    PLT 67 (L) 11/01/2023     Chronic on review, acutely decreased here   Likely in setting of MSSA bacrteremia, TAVON on CKD requiring dialysis   Most recent platelets 67, repeat level this AM with clumped platelets, unable to get accurate count, repeat CBC in the AM  No evidence of acute or active bleeding at this time  Monitor closely
Lab Results   Component Value Date    PLT 68 (L) 10/23/2023     Chronic at baseline
Lab Results   Component Value Date    PLT 68 (L) 10/23/2023     Chronic at baseline
Lab Results   Component Value Date    PLT 74 (L) 10/25/2023     Chronic at baseline
Lab Results   Component Value Date    PLT 75 (L) 10/26/2023     Chronic at baseline
Lab Results   Component Value Date    PLT 94 (L) 10/27/2023     Chronic at baseline
Lab Results   Component Value Date    PLT 94 (L) 10/27/2023     Chronic at baseline
MSSA bacteremia  Previous urine culture positive for alphahemolytic strep, repeat culture with mixed contaminants  Restarted on levofed due to hypotension today  Wbc 17.62  Empiric antibiotics tailored to culture  Medical optimization per primary team  S/p right PCN placement by IR 11/1
No results found for: "HGBA1C"    No results for input(s): "POCGLU" in the last 72 hours.     Blood Sugar Average: Last 72 hrs:    Not on any oral agents  SSI while inpatient  Monitor glucose closely
No results found for: "HGBA1C"    No results for input(s): "POCGLU" in the last 72 hours. Blood Sugar Average: Last 72 hrs:  ?   Not on any oral agents  SSI while inpatient  Monitor glucose closely
No results found for: "HGBA1C"    Recent Labs     10/24/23  1116 10/24/23  1618 10/25/23  0543 10/25/23  1114   POCGLU 212* 272* 201* 190*       Blood Sugar Average: Last 72 hrs:  (P) 214.6  Not on any oral agents  SSI while inpatient  Monitor glucose closely
No results found for: "HGBA1C"    Recent Labs     10/25/23  1114 10/25/23  1655 10/26/23  0819 10/26/23  1129   POCGLU 190* 288* 298* 274*         Blood Sugar Average: Last 72 hrs:  (P) 241.625  Not on any oral agents  SSI while inpatient  Monitor glucose closely  Prandial and basal insulin added
No results found for: "HGBA1C"    Recent Labs     10/26/23  1619 10/26/23  2133 10/27/23  0801 10/27/23  1147   POCGLU 253* 215* 139 211*         Blood Sugar Average: Last 72 hrs:  (P) 229.25  Not on any oral agents  SSI while inpatient  Monitor glucose closely  Prandial and basal insulin added
No results found for: "HGBA1C"    Recent Labs     10/27/23  2143 10/28/23  0826 10/28/23  1238 10/28/23  1435   POCGLU 150* 116 144* 123         Blood Sugar Average: Last 72 hrs:  (P) 881.0681551852814500  Not on any oral agents  SSI while inpatient  Monitor glucose closely  Prandial and basal insulin added
No results found for: "HGBA1C"    Recent Labs     10/28/23  1435 10/28/23  1655 10/28/23  2057 10/29/23  1054   POCGLU 123 110 99 166*         Blood Sugar Average: Last 72 hrs:  (P) 178.0482317372380683  Not on any oral agents  SSI while inpatient  Monitor glucose closely  Prandial and basal insulin added
No results found for: "HGBA1C"    Recent Labs     10/28/23  2057 10/29/23  1054 10/29/23  1634 10/29/23  2108   POCGLU 99 166* 218* 241*         Blood Sugar Average: Last 72 hrs:  (P) 186.6302213618464856  Not on any oral agents  SSI while inpatient  Monitor glucose closely  Prandial and basal insulin added
No results found for: "HGBA1C"    Recent Labs     11/01/23  0727 11/01/23  1207 11/01/23  1712 11/02/23  0741   POCGLU 163* 216* 159* 158*         Blood Sugar Average: Last 72 hrs:  (P) [id number]  Repeat hgbA1c while inpatient   Not maintained on any oral agents outpatient  Continue SSI while inpatient  Monitor glucose closely  Prandial and basal insulin added
Past medical history of vertigo, dementia and cirrhosis presenting after multiple falls over last 2 days. After discussion with family, patient has been dizzy for several weeks with poor oral intake along with complaints of urinary frequency and dysuria.   Meclizine as needed  Obtain orthostatic vitals  Work-up revealed UTI  PT/OT
Past medical history of vertigo, dementia and cirrhosis presenting after multiple falls over last 2 days. After discussion with family, patient has been dizzy for several weeks with poor oral intake along with complaints of urinary frequency and dysuria.   Meclizine as needed  Obtain orthostatic vitals  Work-up revealed UTI and bacteremia, treatment as below  PT/OT
Plan as above
Plan as above  D/w with ID that due to no plans for chemotherapy that his port should be removed.    Called his wife to discuss removal of the port but she was not available today, will try again tomorrow
Plan as above  D/w with the patient and his wife who are agreeable to port removal  IR consulted for removal
Plan as above  D/w with the patient and his wife who are agreeable to port removal  IR consulted, for port removal today
Plan as above  D/w with the patient and his wife who are agreeable to port removal  IR consulted, for port removal tomorrow
Plan as above  Likely in setting of UTI, ascending renal infection   ID following,  Additional abx to be discontinued in setting of hospice transition   Repeat cultures are negative growth to date   Port removed this admission
Plan as above  Likely in setting of UTI, ascending renal infection   ID following,  Additional abx to be discontinued in setting of hospice transition   Repeat cultures are negative growth to date   Port removed this admission
Plan as above  Likely in setting of UTI, ascending renal infection   ID following,  Continue IV Cefepime   Following IR cultures   Will need a 4 week course of abx total from [facility] clearance pending continued discussion with goals of care  Repeat cultures are negative growth to date   Port removed this admission   Urology evaluated, will need JJ stent exchange at later date
Pt initially presented with multiple falls, found to have MSSA bacteremia, went into septic shock secondary to UTI with ascending infection with persistent hypotension requiring pressor support and MICU admission on 10/30  S/p right PCN placement by IR on 10/31   Flush daily on discharge, with 6 week right ureteral stent removal and PCN exchange outpatient  ID following, continue IV Cefepime for now  Follow gram stain, currently with GNR and staph  Repeat blood cultures negative to date   S/p port removal this admission as well   Pt stable off pressor support and transitioned to med/surg level of care on 11/2   White count improving   Pt is afebrile and hemodynamically stable, however mostly non verbal today, appears uncomfortable and refusing any medications or food/water even for family members. Upon discussion at bedside today with patient's daughter, significant other and brother, they are all in agreement to pursue hospice at this point. I did discuss that since patient is now on dialysis if we would opt for hospice that would mean dialysis would be stopped and transition could be very quickly in that instance. Pt's family members acknowledged this and continue to be in agreement. Hospice consult was placed. Additional family members at bedside now with questions regarding continued medical care vs. Hospice. Will attempt to have family meeting tomorrow for goals of care discussions, in the interim will continue with medical management until further goals are clarified. Consideration for palliative care consult pending further clinical course. Will add PRN dilaudid IV 0.2 mg for severe pain if needed.
Pt initially presented with multiple falls, found to have MSSA bacteremia, went into septic shock secondary to UTI with ascending infection with persistent hypotension requiring pressor support and MICU admission on 10/30  S/p right PCN placement by IR on 10/31   ID following, currently on IV cefepime, will d/c as patient's POAs/family requesting hospice, down graded to level 4 comfort measures only   S/p port removal this admission as well   Pt stable off pressor support and transitioned to med/surg level of care on 11/2   Pt not very interactive over the last 24-48 hours, not eating or drinking anything, refusing multiple medications. Upon further discussion with patient's daughter and wife, they continue to be in agreement to transition patient to hospice. They do not believe the patient would want any further dialysis and did speak with nephrology regarding this. Agreeable to transition to level 4 comfort measures only, no additional HD. Hospice consult placed and liaison to speak to family today. Hopeful for possible hospice house. As patient's POA (daughter and wife) appear to be very clear on patient's wishes, no indication for further family meeting for today.  They are in agreement with plan   Will add PRN IV dilaudid and IV ativan for comfort medications
Pt initially presented with multiple falls, found to have MSSA bacteremia, went into septic shock secondary to UTI with ascending infection with persistent hypotension requiring pressor support and MICU admission on 10/30  S/p right PCN placement by IR on 10/31   ID following, currently on IV cefepime, will d/c as patient's POAs/family requesting hospice, down graded to level 4 comfort measures only   S/p port removal this admission as well   Pt stable off pressor support and transitioned to med/surg level of care on 11/2   Pt not very interactive over the last 24-48 hours, not eating or drinking anything, refusing multiple medications. Upon further discussion with patient's daughter and wife, they continue to be in agreement to transition patient to hospice. They do not believe the patient would want any further dialysis and did speak with nephrology regarding this. Agreeable to transition to level 4 comfort measures only, no additional HD. Hospice consult placed and liaison to speak to family today. Transition to IPU/hospice house this evening. As patient's POA/healthcare representatives (daughter and wife, noted in documentation from outpatient palliative office) appear to be very clear on patient's wishes, no indication for further family meeting for today.  They are in agreement with plan
Pt presented after multiple falls at home   Likely in setting of sepsis, MSSA bacteremia, hypotension, hydronephrosis   Plan as above
Pt presented after multiple falls at home   Likely in setting of sepsis, MSSA bacteremia, hypotension, hydronephrosis   Plan as above
Pt presented after multiple falls at home   Likely in setting of sepsis, MSSA bacteremia, hypotension, hydronephrosis   Plan as above   PT/OT consultations
Remote history of CVA
Remote history of CVA
Remote history of CVA  Continue Plavix
Remote history of CVA  Continue Plavix 75 mg daily and statin
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
56

## 2023-11-03 NOTE — DISCHARGE SUMMARY
[address]  Discharge- Rosmery Tuttle 1950, 74 y.o. adult MRN: [id number]  Unit/Bed#: [facility] 630-57 Encounter: [id number]  Primary Care Provider: Nikita Kennedy DO   Date and time admitted to hospital: 10/23/2023 11:05 AM       DOS: 11/3/2023  * Septic shock ([address])  Assessment & Plan  Pt initially presented with multiple falls, found to have MSSA bacteremia, went into septic shock secondary to UTI with ascending infection with persistent hypotension requiring pressor support and MICU admission on 10/30  S/p right PCN placement by IR on 10/31   ID following, currently on IV cefepime, will d/c as patient's POAs/family requesting hospice, down graded to level 4 comfort measures only   S/p port removal this admission as well   Pt stable off pressor support and transitioned to med/surg level of care on 11/2   Pt not very interactive over the last 24-48 hours, not eating or drinking anything, refusing multiple medications. Upon further discussion with patient's daughter and wife, they continue to be in agreement to transition patient to hospice. They do not believe the patient would want any further dialysis and did speak with nephrology regarding this. Agreeable to transition to level 4 comfort measures only, no additional HD. Hospice consult placed and liaison to speak to family today. Transition to IPU/hospice house this evening. As patient's POA/healthcare representatives (daughter and wife, noted in documentation from outpatient palliative office) appear to be very clear on patient's wishes, no indication for further family meeting for today.  They are in agreement with plan     Acute on chronic renal failure   Assessment & Plan  History of CKD with baseline creatinine of 3.3-3.6  TAVON on CKD this admission likely in setting of ATN, UTI, MSSA bacteremia, hypotension and right sided hydronephrosis   S/p right PCN tube by IR on 10/31 without significant improvement   Nephrology following,  Temp HD catheter placed on 11/1 by ICU team   Pt with hyperkalemia and peak cr. Of 7.5, started HD on 11/2, was not tolerating well, family reports that at prior palliative appointments patient had made it clear he would not want dialysis in the future. Pt's daughter and wife are in agreement to stopping HD based on patient's wishes. Level 4 comfort care placed and hospice consult pending. No additional HD planned at this time. Transition to IPU/hospice house on discharge    MSSA bacteremia  Assessment & Plan  Plan as above  Likely in setting of UTI, ascending renal infection   ID following,  Additional abx to be discontinued in setting of hospice transition   Repeat cultures are negative growth to date   Port removed this admission     Anemia  Assessment & Plan  Chronic anemia likely due to CKD and underlying infection   S/p 2 U PRBC total this admission, last given on 10/31   Hgb 8.6 most recently, additional blood draws on hold in setting of comfort/hospice status   No evidence of acute bleeding noted    History of CVA (cerebrovascular accident)  Assessment & Plan  Remote history of CVA    Thrombocytopenia ([address])  Assessment & Plan  Lab Results   Component Value Date    PLT  11/02/2023      Comment:      NOT MEASURE PLATELET CLUMPS NOTED  This is a corrected result.  Previous result was 69 Thousands/uL on 11/2/2023 at 0920 EDT     Chronic on review, acutely decreased here   Likely in setting of MSSA bacrteremia, TAVON on CKD requiring dialysis   Most recent platelets 67, repeat level this AM with clumped platelets, unable to get accurate count, hold off on additional blood draws for now in setting of comfort measures only     UTI (urinary tract infection)  Assessment & Plan  History of UTIs, this admission, concern for ascending UTI with subsequent MSSA bacteremia and septic shock as noted above   CT scan on admission with moderate right hydronephrosis and bladder wall thickening  ID following,  Currently on IV cefepime, transitioning to comfort care/hospice will d/c further abx at this time   Repeat blood cultures are negative for growth to date after initial positive cultures  Uine culture is positive for alpha step and MSSA      T2DM (type 2 diabetes mellitus) [facility])  Assessment & Plan  Lab Results   Component Value Date    HGBA1C 7.4 (H) 11/02/2023       Recent Labs     11/02/23  1207 11/02/23  1649 11/02/23  2051 11/03/23  0748   POCGLU 112 134 128 105         Blood Sugar Average: Last 72 hrs:  (P) [id number]  Updated hgbA1c appears stable  Not maintained on any oral agents outpatient  Hold additional insulin and fingersticks due to comfort measures     History of liver transplant [facility])  Assessment & Plan  History of liver transplant secondary to alcoholic cirrhosis approximately 20 years ago   Pt refusing po tacrolimus here     Bladder cancer 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 [facility])  Assessment & Plan  History of stage IV bladder tumor status post resection and radiation with metastasis to the lungs  TURBT in 2022 with internalized stent  Supportive care  Discharge to inpatient hospice house    Fall  Assessment & Plan  Pt presented after multiple falls at home   Likely in setting of sepsis, MSSA bacteremia, hypotension, hydronephrosis   Plan as above       Medical Problems       Resolved Problems  Date Reviewed: 11/3/2023   None       Discharging Physician / Practitioner: Mitra Maria PA-C  PCP: Kathrin Regalado DO  Admission Date:   Admission Orders (From admission, onward)       Ordered        10/23/23 1557  INPATIENT ADMISSION  Once                          Discharge Date: 11/03/23    Consultations During Hospital Stay:  IR  Urology  ID  Nephrology  Hospice     Procedures Performed:   Xray trauma 10/23 - Spiculated mass in RUL, little change since 8/1/2023. Interval increase in size of bilateral pulm mets.  No evidence of acute traumatic abnormality in the chest   CT head 10/23 - No acute intracranial abnormality CT c spine 10/23 - No cervical spine fracture or traumatic malalignment   CT abd/pelvis 10/23 - Redemonstration of right renal atrophy and moderate right hydronephrosis with a double J right ureteral stent. Mild diffuse right urotherlial thickening and enhancement. Mild diffuse thickening of urinary bladder and adjacent fat stranding. CXR 10/25 - Mild opacity at the left base with loss of left diaphragm. Bilateral lung mets   CXR 10/26 - Possible bibasilar infiltrates or crowding of vessels. Bilateral mets unchanged  CXR 10/30 - Bilateral pulmonary nodules suspicious for tumor. Persistent suspected right pulmonary PNA  IR port removal 10/30   IR nephrostomy tube placement 10/31   CXR 11/1 - again seen are soft tissue like masses bilaterally. Mild congestive changes. Placement of right neck dialysis catheter in distal svc  US kidney and bladder 11/1 - No right sided hydronephrosis status post right nephrostomy catheter placement. Right renal cortical thinning and renal atrophy. Right pleural effusion  ECHO 10/26 - EF 55%, wall motion is normal, diastolic function mildly abdnormal, consistent with G1DD    Significant Findings / Test Results:   Acute renal failure requiring HD  Leukocytosis  Hep C antibody and hep B core reactive  BC (+) for MSSA    Incidental Findings:   NOne   N/A    Test Results Pending at Discharge (will require follow up): None     Outpatient Tests Requested:  N/A    Complications:  Acute renal failure, worsening mental status, septic shock     Reason for Admission: multiple falls at home     Hospital Course:   Lindsey Antonio is a 74 y.o. adult patient with significant past medical history of metastatic bladder cancer, DM, liver transplant, hx of CVA who originally presented to the hospital on 10/23/2023 due to multiple falls at home. During admission he was noted to have TAVON on CKD in addition to MSSA bacteremia.  Likely in setting of ascending UTI, he had PCN tube placed for decompression and seen by ID, placed on IV Ancef. His port was removed. Unfortunately during hospitalization he was noted to go into septic shock with persistent hypotension requiring pressor support in the ICU. His renal function continued to deteriorate and therefore he had temp HD catheter placed and was started on dialysis on 11/2. However, pt became withdrawn, confused, would not eat or drink anything and refused to take additional medications. It was discussed with patient's daughter and wife and they opted for comfort measures/hospice going forward as patient had previously noted that he would not want any long term dialysis. Pt was transitioned to comfort measures and discharged to IPU/hospice house. For additional information please refer to medical records. Please see above list of diagnoses and related plan for additional information. Condition at Discharge: stable    Discharge Day Visit / Exam:   * Please refer to separate progress note for these details *    Discussion with Family: Updated  (wife and daughter) at bedside. Discharge instructions/Information to patient and family:   See after visit summary for information provided to patient and family. Provisions for Follow-Up Care:  See after visit summary for information related to follow-up care and any pertinent home health orders. Disposition:   Other: IPU/hospice house     Planned Readmission: NOne     Discharge Statement:  I spent 50 minutes discharging the patient. This time was spent on the day of discharge. I had direct contact with the patient on the day of discharge. Greater than 50% of the total time was spent examining patient, answering all patient questions, arranging and discussing plan of care with patient as well as directly providing post-discharge instructions. Additional time then spent on discharge activities.     Discharge Medications:  See after visit summary for reconciled discharge medications provided to patient and/or family.       **Please Note: This note may have been constructed using a voice recognition system**

## 2023-11-03 NOTE — PROGRESS NOTES
NEPHROLOGY PROGRESS NOTE   Jane Bear 74 y.o. adult MRN: [id number]  Unit/Bed#: [facility] 460-77 Encounter: [id number]  Reason for Consult: TAVON CKD    ASSESSMENT AND PLAN:  TAVON on CKD stage IV, baseline creatinine around 3.6, follows with Dr. Nixon Stewart  -TAVON suspect secondary to ATN, UTI, MSSA bacteremia, hypotension, this is in the setting of right renal atrophy with moderate right hydronephrosis  -Creatinine peak level 7.5. Urine output remains lower  -Due to significantly worsening renal failure, concern for slowly developing uremic symptoms, hyperkalemia, worsening acidosis with minimal urine output, patient was started on hemodialysis on 11/1/2023. For second session of HD yesterday.  -Currently has temporary HD catheter.   -I had detailed discussion with patient's spouse, daughter at bedside today. As per family and patient they do not want to pursue dialysis or any further aggressive care and want to pursue hospice care. They fully understand risk involved of not pursuing dialysis including death if renal function continues to decline. They verbalized understanding, discussion with them. No further dialysis or labs as per patient and family request.     Right renal atrophy with moderate right hydronephrosis, status post right ureteral stent.  -Status post Mcallister catheter, status post right-sided PCN tube placement with purulent urine drained on 10/31/2023. 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  -Urology following. As per medical records review, did not tolerate chemo due to toxicity. s/p radiation.      MSSA bacteremia, septic shock suspect in the setting of ascending UTI, status post right ureteral stent, currently antibiotic as per ID.  -Briefly required Levophed earlier on admission, now remains off     History of liver transplant about 20 years ago as per patient. Now remains on tacrolimus. Tacrolimus trough level 7.6 on 10/30/2023   -Patient pursuing hospice care. Metabolic acidosis, much improved with dialysis. Bicarb level 24. Discussed above plan in detail with primary team regarding patient and family wanting to pursue comfort care, not doing further dialysis or any further labs or aggressive medical care and they agree with above recommendations. We will sign off. Please call with any questions. SUBJECTIVE:  Patient seen and examined at bedside. Patient feels overall very tired and has been refusing medications as per discussed with floor nurse.     OBJECTIVE:  Current Weight: Weight - Scale: 49.1 kg (108 lb 3.9 oz)  Vitals:    11/02/23 2244   BP: 127/62   Pulse: 88   Resp: 16   Temp: 98.8 °F (37.1 °C)   SpO2: 95%       Intake/Output Summary (Last 24 hours) at 11/3/2023 1051  Last data filed at 11/3/2023 0630  Gross per 24 hour   Intake 20 ml   Output 1030 ml   Net -1010 ml     Wt Readings from Last 3 Encounters:   11/02/23 49.1 kg (108 lb 3.9 oz)     Temp Readings from Last 3 Encounters:   11/02/23 98.8 °F (37.1 °C)     BP Readings from Last 3 Encounters:   11/02/23 127/62     Pulse Readings from Last 3 Encounters:   11/02/23 88        Physical Examination:  Eyes: Mild conjunctival pallor present  Neck: No obvious lymphadenopathy appreciated  Respiratory: Bilateral air entry present  CVS: No significant edema in legs  GI: Soft, nondistended  CNS: Opens eyes, follows commands  Skin: No new rash  Musculoskeletal: No obvious new gross deformity noted    Medications:    Current Facility-Administered Medications:     acetaminophen (TYLENOL) tablet 975 mg, 975 mg, Oral, Q6H PRN, Tony Barreto Depope, DO, 975 mg at 11/02/23 1434    atorvastatin (LIPITOR) tablet 80 mg, 80 mg, Oral, Daily With Sabas Roach Depope, DO, 80 mg at 11/01/23 1630    cefepime (MAXIPIME) 1,000 mg in dextrose 5 % 50 mL IVPB, 1,000 mg, Intravenous, Q24H, Je Joyce, DO, Last Rate: 100 mL/hr at 11/02/23 1304, 1,000 mg at 11/02/23 1304    chlorhexidine (PERIDEX) 0.12 % oral rinse 15 mL, 15 mL, Mouth/Throat, Q12H [address], Charisse Dodge Depope, DO, 15 mL at 11/01/23 2221    clopidogrel (PLAVIX) tablet 75 mg, 75 mg, Oral, Daily, Charisse Joyce, DO, 75 mg at 11/01/23 0848    dextromethorphan-guaiFENesin (ROBITUSSIN DM) oral syrup 10 mL, 10 mL, Oral, Q4H PRN, Je Joyce DO, 10 mL at 10/30/23 0442    docusate sodium (COLACE) capsule 100 mg, 100 mg, Oral, BID, Charisse Joyce, DO, 100 mg at 11/01/23 0849    heparin (porcine) subcutaneous injection 5,000 Units, 5,000 Units, Subcutaneous, Q8H [address], Charisse Joyce, DO, 5,000 Units at 11/03/23 3634    HYDROmorphone HCl (DILAUDID) injection 0.2 mg, 0.2 mg, Intravenous, Q4H PRN, Raquel Johnson PA-C, 0.2 mg at 11/02/23 2155    insulin lispro (HumaLOG) 100 units/mL subcutaneous injection 1-5 Units, 1-5 Units, Subcutaneous, TID AC, 1 Units at 11/02/23 0749 **AND** Fingerstick Glucose (POCT), , , TID AC, Charisse Joyce, DO    meclizine (ANTIVERT) tablet 25 mg, 25 mg, Oral, Q8H PRN, Charisse Joyce, DO    nortriptyline (PAMELOR) capsule 10 mg, 10 mg, Oral, HS, Charisse Joyce, DO, 10 mg at 11/01/23 2223    ondansetron (ZOFRAN) injection 4 mg, 4 mg, Intravenous, Q4H PRN, Je Joyce DO, 4 mg at 10/30/23 0503    oxybutynin (DITROPAN) tablet 5 mg, 5 mg, Oral, BID, Charisse Joyce DO, 5 mg at 11/01/23 1712    oxyCODONE (ROXICODONE) split tablet 2.5 mg, 2.5 mg, Oral, Q4H PRN, Martínez Robles MD, 2.5 mg at 11/03/23 0933    pantoprazole (PROTONIX) EC tablet 40 mg, 40 mg, Oral, Daily, Charisse Joyce DO, 40 mg at 11/01/23 0848    pregabalin (LYRICA) capsule 75 mg, 75 mg, Oral, BID, Charisse Joyce, DO, 75 mg at 11/01/23 1712    senna (SENOKOT) tablet 17.2 mg, 2 tablet, Oral, Once, Je Joyce DO    sodium bicarbonate tablet 1,300 mg, 1,300 mg, Oral, BID after meals, Mukund Guillen MD    sucralfate (CARAFATE) tablet 1 g, 1 g, Oral, 4x Daily (AC & HS), Luis Haile Depope, DO, 1 g at 11/01/23 2216    tacrolimus (PROGRAF) capsule 1 mg, 1 mg, Oral, Q12H [address], Charisse Dodge Depope, DO, 1 mg at 11/02/23 1433    temazepam (RESTORIL) capsule 15 mg, 15 mg, Oral, HS PRN, Luis Haile Depope, DO, 15 mg at 10/31/23 2112    Laboratory Results:  Results from last 7 days   Lab Units 11/02/23  0817 11/02/23  0017 11/01/23  1719 11/01/23  1230 11/01/23  0550 10/31/23  1548 10/31/23  0640 10/31/23  0456 10/30/23  1922 10/30/23  1414 10/30/23  1102 10/30/23  0900 10/30/23  0407 10/29/23  0553   WBC Thousand/uL 13.30*  --   --   --  17.62* 15.04*  --  14.80*  --  20.64*  --   --  2.62* 8.79   HEMOGLOBIN g/dL 8.6*  --   --   --  9.5* 8.5* 6.3* 6.9*  --  7.4*  --   --  9.4* 7.2*   HEMATOCRIT % 25.0*  --   --   --  28.8* 24.4* 19.2* 20.9*  --  21.4*  --   --  29.6* 22.9*   PLATELETS Thousands/uL  --   --   --   --  67* 67*  --  66*  --  87*  --  77* 113* 101*   SODIUM mmol/L 131* 130* 129* 125* 127*  --   --  132* 132*  --  136  --  132* 134*   POTASSIUM mmol/L 4.2 4.6 4.2 5.7* 6.0*  --   --  4.6 5.1  --  3.1*  --  4.0 4.0   CHLORIDE mmol/L 97 98 96 94* 96  --   --  100 99  --  100  --  97 101   CO2 mmol/L 24 20* 22 18* 18*  --   --  21 20*  --  22  --  20* 21   BUN mg/dL 71* 66* 60* 93* 88*  --   --  73* 70*  --  73*  --  72* 57*   CREATININE mg/dL 6.50* 5.91* 5.20* 7.51* 7.55*  --   --  6.68* 6.24*  --  6.52*  --  6.40* 5.44*   CALCIUM mg/dL 7.4* 7.3* 7.5* 8.0* 7.8*  --   --  7.3* 7.1*  --  7.2*  --  7.9* 7.4*   MAGNESIUM mg/dL 2.1  --   --   --  2.1  --   --  2.0  --   --  2.0  --   --   --    PHOSPHORUS mg/dL 4.7*  --   --   --  6.4*  --   --  4.0  --   --   --   --   --   --        US kidney and bladder   Final Result by Barbie Gaviria MD (11/02 4316)      No right-sided hydronephrosis status post right nephrostomy catheter placement. Reidentified right renal cortical thinning and renal atrophy. Right pleural effusion, incompletely evaluated. Workstation performed: [id number]         XR chest portable ICU   Final Result by Kerrie Gunter MD (11/01 1635)   Again seen are soft tissue like masses bilaterally. CT chest with contrast is recommended. Mild congestive changes. Placement of right neck dialysis catheter in the distal SVC. Workstation performed: [id number]         IR nephrostomy tube placement   Final Result by Fabby Linder MD (11/01 1333)   Impression: Successful placement of a right 10 French nephrostomy tube from a right lower pole calyceal approach due to marked pyonephrosis. Tan purulent fluid was removed and sent for analysis. PLAN: Tube to bag drainage. Return in 6 weeks to remove the internal ureteral stent and replace the right nephrostomy tube. A nephroureteral catheter could be placed instead however it cannot be capped since the patient will not tolerate capping after    discussion with urology. Workstation performed: [id number]         IR port removal   Final Result by Joelle Trujillo MD (10/30 1815)      Right chest port removal at bedside. Plan:      Return to IR as needed. Workstation performed: [id number]         XR chest portable   Final Result by Meghna Sanchez MD (10/31 9586)   Bilateral pulmonary nodules suspicious for tumor. Further assessment via CT recommended      Interval placement of left IJ CVP line without complication      Persistent suspected right pulmonary pneumonia               Workstation performed: [id number]         XR chest pa & lateral   Final Result by Kerrie Gunter MD (10/27 1785)   Possible bibasilar infiltrates or crowding of vessels. Bilateral metastasis unchanged.                Workstation performed: [id number]         XR chest portable   Final Result by Jazmin Kidd MD (10/26 9054)      Mild opacity at the left base with loss of the left diaphragm. This could be due to atelectasis but pneumonia not excluded in the appropriate clinical setting. Bilateral lung metastases. Workstation performed: [id number]         CT abdomen pelvis with contrast   Final Result by Estela Bowling MD (10/23 1455)      1. Redemonstration of right renal atrophy and moderate right hydronephrosis with a double-J right ureteral stent. There is mild diffuse right urothelial thickening and enhancement. There is also mild diffuse thickening of the urinary bladder and adjacent    fat stranding. This may be explained by an infectious etiology including right ureteritis and cystitis. 2. Skin thickening and/or subcutaneous edema in the periumbilical area may be due to history of trauma. Workstation performed: [id number]         CT head without contrast   Final Result by Xochilt Love DO (10/23 1300)   Addendum (preliminary) 1 of 1 by Xochilt Love DO (10/23 1300)   ADDENDUM:   Prior from July 31, 2023 performed under separate medical record number. Prior left-sided aneurysm clipping and local encephalomalacia is    unchanged. I personally discussed the CT brain and C-spine with Clarence    on 10/23/2023 1:00 PM.            Final   No acute intracranial abnormality. Workstation performed: [id number]         CT spine cervical without contrast   Final Result by Xochilt Love DO (10/23 3673)   No cervical spine fracture or traumatic malalignment. Workstation performed: [id number]         XR trauma multiple   Final Result by Reny Street MD (10/23 2053)      Spiculated mass in the right upper lobe, little changed since 8/1/2023. Interval increase in size of bilateral pulmonary metastases. No evidence of acute traumatic abnormality in the chest.      The study was marked in [facility] for immediate notification.          Workstation performed: [id number]         XR chest portable   Final Result by Portia Morfin MD (10/24 4626)      Spiculated mass in the right upper lobe, little changed since 8/1/2023. Interval increase in size of bilateral pulmonary metastases. No evidence of acute traumatic abnormality in the chest.      The study was marked in [facility] for immediate notification. Workstation performed: [id number]             Portions of the record may have been created with voice recognition software. Occasional wrong word or "sound a like" substitutions may have occurred due to the inherent limitations of voice recognition software. Read the chart carefully and recognize, using context, where substitutions have occurred.

## 2023-11-03 NOTE — CASE MANAGEMENT
Case Management Discharge Planning Note    Patient name Taj Gillette  Location [facility] 614/[facility] 750-45 MRN [id number]  : 1950 Date 11/3/2023       Current Admission Date: 10/23/2023  Current Admission Diagnosis:Septic shock [facility])   Patient Active Problem List    Diagnosis Date Noted    Septic shock ([address]) 10/30/2023    MSSA bacteremia 10/26/2023    Fall 10/23/2023    Bladder cancer 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 ([address]) 10/23/2023    Insomnia 10/23/2023    History of liver transplant ([address]) 10/23/2023    T2DM (type 2 diabetes mellitus) ([address]) 10/23/2023    Acute on chronic renal failure  10/23/2023    UTI (urinary tract infection) 10/23/2023    Dementia ([address]) 10/23/2023    CKD (chronic kidney disease) 10/23/2023    Thrombocytopenia ([address]) 10/23/2023    History of CVA (cerebrovascular accident) 10/23/2023    Anemia 10/23/2023      LOS (days): 11  Geometric Mean LOS (GMLOS) (days): 3.90  Days to GMLOS:-6.9     OBJECTIVE:  Risk of Unplanned Readmission Score: 32.1         Current admission status: Inpatient   Preferred Pharmacy:   UNKNOWN - FOLLOW UP PRIOR TO DISCHARGE TO E-PRESCRIBE  No address on file      Primary Care Provider: Vivian Joseph DO    Primary Insurance: [facility]  Secondary Insurance:     DISCHARGE DETAILS:    Other Referral/Resources/Interventions Provided:  Referral Comments: Message sent to hospice liason, informing that hospice can be discussed with patient/ family.

## 2023-11-03 NOTE — SPEECH THERAPY NOTE
Speech/Language Pathology Note    Patient Name: Kim HAYWOOD'S Date: 11/3/2023     Pt is transitioning to hospice. ST will sign off at this time.

## 2023-11-04 LAB
BACTERIA BLD CULT: NORMAL
BACTERIA BLD CULT: NORMAL

## 2023-11-06 NOTE — UTILIZATION REVIEW
NOTIFICATION OF ADMISSION DISCHARGE   This is a Notification of Discharge from [address]. Please be advised that this patient has been discharge from our facility. Below you will find the admission and discharge date and time including the patient’s disposition. UTILIZATION REVIEW CONTACT:  Angela Larson  Utilization   Network Utilization Review Department  Phone: [phone] x carefully listen to the prompts. All voicemails are confidential.  Email: [email or web address]     ADMISSION INFORMATION  PRESENTATION DATE: 10/23/2023 11:05 AM  OBERVATION ADMISSION DATE:   INPATIENT ADMISSION DATE: 10/23/23  3:57 PM   DISCHARGE DATE: 11/3/2023  9:15 PM   DISPOSITION:[facility]/Swing Bed    Network Utilization Review Department  ATTENTION: Please call with any questions or concerns to [phone] and carefully listen to the prompts so that you are directed to the right person. All voicemails are confidential.   For Discharge needs, contact Care Management DC Support Team at [phone] opt. 2  Send all requests for admission clinical reviews, approved or denied determinations and any other requests to dedicated fax number below belonging to the campus where the patient is receiving treatment.  List of dedicated fax numbers for the Facilities:  [place] DENIALS (Administrative/Medical Necessity) [phone]   DISCHARGE SUPPORT TEAM (Network) [phone] [address] [facility]s) [phone]   [address] [facility] [address] [place] [address] [address]  [facility] [phone]

## 2023-11-07 NOTE — PROGRESS NOTES
OSW called Josefa this morning. She stated Lincoln Hickey was transferred to [facility] inpatient hospice house. She stated he went into kidney failure and tried dialysis, but Trupti Aguilar stated she knew he would not want to be put through all of this aggressive care. Trupti Aguilar stated Manjula Ayoub is having a very hard time understanding what is happening to her father. She stated she waking up in the middle of the night and coming into Josefa's room and asks for a piece of her father's clothing to cuddle and smell. Dylon Bledsoe is losing weight and having increased headaches. She asked if she could bring Crystal to this writer to talk about the dying process and support her. OSW strongly suggested she utilize the support from the inpatient hospice team (SW and ) and offered to reach out to SW colleague about this. She was very agreeable. Significant time spent talking to Trupti Aguilar and offering emotional support. Encouraged her to use this time to spend with Lincoln Hickey and their family. She asked if OSW can call her again. OSW sent [facility] Text to [facility]. Hospice SW and s/w him regarding above. Jonna Watson will pass request to his colleague who is working this week. Balbir Peguero for assistance.

## 2023-11-07 NOTE — TELEPHONE ENCOUNTER
[address] [facility] is calling stating that Cosme Genao was admitted on November 3. They are just letting Dr. Romel Palma aware. Any questions, please call hospice at [phone]    Thank you.

## 2023-11-09 ENCOUNTER — HOME CARE VISIT (OUTPATIENT)
Dept: HOME HOSPICE | Facility: HOSPICE | Age: 73
End: 2023-11-09
Payer: MEDICARE

## 2023-11-10 ENCOUNTER — TELEPHONE (OUTPATIENT)
Age: 73
End: 2023-11-10

## 2023-11-10 NOTE — TELEPHONE ENCOUNTER
Lisette Estrada from hospice reaching out with a courtesy  call that Aram Salazar passed away November 8

## 2023-11-27 ENCOUNTER — HOME CARE VISIT (OUTPATIENT)
Dept: HOME HOSPICE | Facility: HOSPICE | Age: 73
End: 2023-11-27
Payer: MEDICARE

## 2024-01-19 NOTE — ASSESSMENT & PLAN NOTE
Patient: Nancy Dalton    Procedure Summary       Date: 01/19/24 Room / Location: [facility] OR 04 / [facility] OR    Anesthesia Start: 0743 Anesthesia Stop: 0855    Procedure: Excision Lesion Rectum Diagnosis:       Rectal polyp      (Rectal polyp [K62.1])    Surgeons: Gisell Viramontes MD Responsible Provider: Placido Sebastian DO    Anesthesia Type: general ASA Status: 1            Anesthesia Type: general    Vitals Value Taken Time   /85 01/19/24 0905   Temp 36 °C (96.8 °F) 01/19/24 0905   Pulse 80 01/19/24 0905   Resp 16 01/19/24 0905   SpO2 99 % 01/19/24 0905       Anesthesia Post Evaluation    Patient location during evaluation: PACU  Patient participation: complete - patient participated  Level of consciousness: awake  Pain management: satisfactory to patient  Multimodal analgesia pain management approach  Airway patency: patent  Cardiovascular status: acceptable  Respiratory status: acceptable  Hydration status: acceptable  Postoperative Nausea and Vomiting: none    There were no known notable events for this encounter.     · Monitor mental status  · Avoid sedating medication

## 2024-01-22 RX ORDER — NORTRIPTYLINE HYDROCHLORIDE 10 MG/1
CAPSULE ORAL
Qty: 30 CAPSULE | OUTPATIENT
Start: 2024-01-22

## 2024-04-21 RX ORDER — NORTRIPTYLINE HYDROCHLORIDE 10 MG/1
CAPSULE ORAL
Qty: 30 CAPSULE | OUTPATIENT
Start: 2024-04-21

## 2024-06-02 NOTE — ASSESSMENT & PLAN NOTE
Lab Results   Component Value Date    HGBA1C 8 8 (H) 12/17/2018       Recent Labs      12/18/18   1037   POCGLU  154*       Blood Sugar Average: Last 72 hrs:  Monitor   , improve diet
Seeing neurology  Started on lipitor and plavix for now
Stable at this time  Will see neurology
Was seeing Dr Omaira Pierce  Will reschedule follow up
Never
